# Patient Record
Sex: FEMALE | Race: BLACK OR AFRICAN AMERICAN | NOT HISPANIC OR LATINO
[De-identification: names, ages, dates, MRNs, and addresses within clinical notes are randomized per-mention and may not be internally consistent; named-entity substitution may affect disease eponyms.]

---

## 2017-01-17 ENCOUNTER — RECORD ABSTRACTING (OUTPATIENT)
Age: 67
End: 2017-01-17

## 2017-01-17 ENCOUNTER — APPOINTMENT (OUTPATIENT)
Dept: CARDIOLOGY | Facility: CLINIC | Age: 67
End: 2017-01-17

## 2017-01-17 ENCOUNTER — NON-APPOINTMENT (OUTPATIENT)
Age: 67
End: 2017-01-17

## 2017-01-17 VITALS
HEART RATE: 77 BPM | WEIGHT: 148 LBS | DIASTOLIC BLOOD PRESSURE: 76 MMHG | RESPIRATION RATE: 16 BRPM | BODY MASS INDEX: 25.27 KG/M2 | SYSTOLIC BLOOD PRESSURE: 121 MMHG | HEIGHT: 64 IN | OXYGEN SATURATION: 98 %

## 2017-01-17 DIAGNOSIS — Z86.19 PERSONAL HISTORY OF OTHER INFECTIOUS AND PARASITIC DISEASES: ICD-10-CM

## 2017-01-17 DIAGNOSIS — I10 ESSENTIAL (PRIMARY) HYPERTENSION: ICD-10-CM

## 2017-01-17 DIAGNOSIS — E11.9 TYPE 2 DIABETES MELLITUS W/OUT COMPLICATIONS: ICD-10-CM

## 2017-01-17 DIAGNOSIS — R07.9 CHEST PAIN, UNSPECIFIED: ICD-10-CM

## 2017-01-17 DIAGNOSIS — Z78.9 OTHER SPECIFIED HEALTH STATUS: ICD-10-CM

## 2017-01-17 DIAGNOSIS — Z87.19 PERSONAL HISTORY OF OTHER DISEASES OF THE DIGESTIVE SYSTEM: ICD-10-CM

## 2017-01-17 DIAGNOSIS — Z86.69 PERSONAL HISTORY OF OTHER DISEASES OF THE NERVOUS SYSTEM AND SENSE ORGANS: ICD-10-CM

## 2017-01-17 DIAGNOSIS — E78.00 PURE HYPERCHOLESTEROLEMIA, UNSPECIFIED: ICD-10-CM

## 2017-01-17 DIAGNOSIS — M15.9 POLYOSTEOARTHRITIS, UNSPECIFIED: ICD-10-CM

## 2017-01-17 DIAGNOSIS — L98.9 DISORDER OF THE SKIN AND SUBCUTANEOUS TISSUE, UNSPECIFIED: ICD-10-CM

## 2017-01-17 DIAGNOSIS — Z86.39 PERSONAL HISTORY OF OTHER ENDOCRINE, NUTRITIONAL AND METABOLIC DISEASE: ICD-10-CM

## 2017-01-17 DIAGNOSIS — Z86.59 PERSONAL HISTORY OF OTHER MENTAL AND BEHAVIORAL DISORDERS: ICD-10-CM

## 2017-01-17 DIAGNOSIS — F25.9 SCHIZOAFFECTIVE DISORDER, UNSPECIFIED: ICD-10-CM

## 2017-01-17 RX ORDER — LISINOPRIL 30 MG/1
TABLET ORAL
Refills: 0 | Status: ACTIVE | COMMUNITY

## 2017-01-17 RX ORDER — METFORMIN HYDROCHLORIDE 625 MG/1
TABLET ORAL
Refills: 0 | Status: ACTIVE | COMMUNITY

## 2017-01-17 RX ORDER — CLOZAPINE 200 MG/1
TABLET ORAL
Refills: 0 | Status: ACTIVE | COMMUNITY

## 2017-01-17 RX ORDER — AMLODIPINE BESYLATE 5 MG/1
TABLET ORAL
Refills: 0 | Status: ACTIVE | COMMUNITY

## 2017-01-18 ENCOUNTER — NON-APPOINTMENT (OUTPATIENT)
Age: 67
End: 2017-01-18

## 2017-01-18 PROBLEM — Z87.19 HISTORY OF ESOPHAGEAL REFLUX: Status: RESOLVED | Noted: 2017-01-18 | Resolved: 2017-01-18

## 2017-01-18 PROBLEM — M15.9 GENERALIZED OSTEOARTHRITIS: Status: RESOLVED | Noted: 2017-01-18 | Resolved: 2017-01-18

## 2017-01-18 PROBLEM — Z86.59 HISTORY OF DEPRESSION: Status: RESOLVED | Noted: 2017-01-18 | Resolved: 2017-01-18

## 2017-01-18 PROBLEM — Z86.69 HISTORY OF PARKINSON'S DISEASE: Status: RESOLVED | Noted: 2017-01-18 | Resolved: 2017-01-18

## 2017-01-18 PROBLEM — Z86.39 HISTORY OF HYPOTHYROIDISM: Status: RESOLVED | Noted: 2017-01-18 | Resolved: 2017-01-18

## 2017-01-26 ENCOUNTER — APPOINTMENT (OUTPATIENT)
Dept: CT IMAGING | Facility: CLINIC | Age: 67
End: 2017-01-26

## 2017-01-30 ENCOUNTER — APPOINTMENT (OUTPATIENT)
Dept: CV DIAGNOSITCS | Facility: HOSPITAL | Age: 67
End: 2017-01-30

## 2017-01-30 ENCOUNTER — OUTPATIENT (OUTPATIENT)
Dept: OUTPATIENT SERVICES | Facility: HOSPITAL | Age: 67
LOS: 1 days | End: 2017-01-30
Payer: MEDICAID

## 2017-01-30 DIAGNOSIS — R07.9 CHEST PAIN, UNSPECIFIED: ICD-10-CM

## 2017-01-30 PROCEDURE — 93306 TTE W/DOPPLER COMPLETE: CPT | Mod: 26

## 2017-02-14 ENCOUNTER — APPOINTMENT (OUTPATIENT)
Dept: CARDIOLOGY | Facility: CLINIC | Age: 67
End: 2017-02-14

## 2017-02-14 ENCOUNTER — APPOINTMENT (OUTPATIENT)
Dept: CV DIAGNOSITCS | Facility: HOSPITAL | Age: 67
End: 2017-02-14

## 2017-09-11 ENCOUNTER — APPOINTMENT (OUTPATIENT)
Dept: MAMMOGRAPHY | Facility: CLINIC | Age: 67
End: 2017-09-11
Payer: MEDICAID

## 2017-09-11 ENCOUNTER — OUTPATIENT (OUTPATIENT)
Dept: OUTPATIENT SERVICES | Facility: HOSPITAL | Age: 67
LOS: 1 days | End: 2017-09-11
Payer: MEDICARE

## 2017-09-11 ENCOUNTER — APPOINTMENT (OUTPATIENT)
Dept: ULTRASOUND IMAGING | Facility: CLINIC | Age: 67
End: 2017-09-11
Payer: MEDICAID

## 2017-09-11 DIAGNOSIS — Z00.00 ENCOUNTER FOR GENERAL ADULT MEDICAL EXAMINATION WITHOUT ABNORMAL FINDINGS: ICD-10-CM

## 2017-09-11 PROCEDURE — 76642 ULTRASOUND BREAST LIMITED: CPT

## 2017-09-11 PROCEDURE — 76642 ULTRASOUND BREAST LIMITED: CPT | Mod: 26,RT

## 2017-09-11 PROCEDURE — G0279: CPT

## 2017-09-11 PROCEDURE — G0279: CPT | Mod: 26

## 2017-09-11 PROCEDURE — G0204: CPT | Mod: 26

## 2017-09-11 PROCEDURE — 77066 DX MAMMO INCL CAD BI: CPT

## 2017-10-28 ENCOUNTER — EMERGENCY (EMERGENCY)
Facility: HOSPITAL | Age: 67
LOS: 1 days | Discharge: NURSING, REHAB OR LONG-TERM CARE | End: 2017-10-28
Attending: EMERGENCY MEDICINE | Admitting: EMERGENCY MEDICINE
Payer: MEDICAID

## 2017-10-28 VITALS
HEART RATE: 81 BPM | SYSTOLIC BLOOD PRESSURE: 149 MMHG | OXYGEN SATURATION: 100 % | TEMPERATURE: 98 F | DIASTOLIC BLOOD PRESSURE: 83 MMHG | RESPIRATION RATE: 18 BRPM

## 2017-10-28 VITALS
DIASTOLIC BLOOD PRESSURE: 82 MMHG | SYSTOLIC BLOOD PRESSURE: 129 MMHG | TEMPERATURE: 98 F | RESPIRATION RATE: 16 BRPM | HEART RATE: 95 BPM | OXYGEN SATURATION: 100 %

## 2017-10-28 LAB
ALBUMIN SERPL ELPH-MCNC: 3.6 G/DL — SIGNIFICANT CHANGE UP (ref 3.3–5)
ALP SERPL-CCNC: 129 U/L — HIGH (ref 40–120)
ALT FLD-CCNC: 18 U/L — SIGNIFICANT CHANGE UP (ref 4–33)
AST SERPL-CCNC: 22 U/L — SIGNIFICANT CHANGE UP (ref 4–32)
BASOPHILS # BLD AUTO: 0.04 K/UL — SIGNIFICANT CHANGE UP (ref 0–0.2)
BASOPHILS NFR BLD AUTO: 0.7 % — SIGNIFICANT CHANGE UP (ref 0–2)
BILIRUB SERPL-MCNC: 0.2 MG/DL — SIGNIFICANT CHANGE UP (ref 0.2–1.2)
BUN SERPL-MCNC: 18 MG/DL — SIGNIFICANT CHANGE UP (ref 7–23)
CALCIUM SERPL-MCNC: 9.4 MG/DL — SIGNIFICANT CHANGE UP (ref 8.4–10.5)
CHLORIDE SERPL-SCNC: 103 MMOL/L — SIGNIFICANT CHANGE UP (ref 98–107)
CO2 SERPL-SCNC: 25 MMOL/L — SIGNIFICANT CHANGE UP (ref 22–31)
CREAT SERPL-MCNC: 0.89 MG/DL — SIGNIFICANT CHANGE UP (ref 0.5–1.3)
EOSINOPHIL # BLD AUTO: 0.19 K/UL — SIGNIFICANT CHANGE UP (ref 0–0.5)
EOSINOPHIL NFR BLD AUTO: 3.3 % — SIGNIFICANT CHANGE UP (ref 0–6)
GLUCOSE SERPL-MCNC: 92 MG/DL — SIGNIFICANT CHANGE UP (ref 70–99)
HCT VFR BLD CALC: 33.5 % — LOW (ref 34.5–45)
HGB BLD-MCNC: 10.7 G/DL — LOW (ref 11.5–15.5)
IMM GRANULOCYTES # BLD AUTO: 0.02 # — SIGNIFICANT CHANGE UP
IMM GRANULOCYTES NFR BLD AUTO: 0.3 % — SIGNIFICANT CHANGE UP (ref 0–1.5)
LYMPHOCYTES # BLD AUTO: 1.66 K/UL — SIGNIFICANT CHANGE UP (ref 1–3.3)
LYMPHOCYTES # BLD AUTO: 29 % — SIGNIFICANT CHANGE UP (ref 13–44)
MAGNESIUM SERPL-MCNC: 1.9 MG/DL — SIGNIFICANT CHANGE UP (ref 1.6–2.6)
MCHC RBC-ENTMCNC: 27.2 PG — SIGNIFICANT CHANGE UP (ref 27–34)
MCHC RBC-ENTMCNC: 31.9 % — LOW (ref 32–36)
MCV RBC AUTO: 85.2 FL — SIGNIFICANT CHANGE UP (ref 80–100)
MONOCYTES # BLD AUTO: 0.58 K/UL — SIGNIFICANT CHANGE UP (ref 0–0.9)
MONOCYTES NFR BLD AUTO: 10.1 % — SIGNIFICANT CHANGE UP (ref 2–14)
NEUTROPHILS # BLD AUTO: 3.23 K/UL — SIGNIFICANT CHANGE UP (ref 1.8–7.4)
NEUTROPHILS NFR BLD AUTO: 56.6 % — SIGNIFICANT CHANGE UP (ref 43–77)
NRBC # FLD: 0 — SIGNIFICANT CHANGE UP
PHOSPHATE SERPL-MCNC: 4 MG/DL — SIGNIFICANT CHANGE UP (ref 2.5–4.5)
PLATELET # BLD AUTO: 219 K/UL — SIGNIFICANT CHANGE UP (ref 150–400)
PMV BLD: 11.3 FL — SIGNIFICANT CHANGE UP (ref 7–13)
POTASSIUM SERPL-MCNC: 3.8 MMOL/L — SIGNIFICANT CHANGE UP (ref 3.5–5.3)
POTASSIUM SERPL-SCNC: 3.8 MMOL/L — SIGNIFICANT CHANGE UP (ref 3.5–5.3)
PROT SERPL-MCNC: 7.3 G/DL — SIGNIFICANT CHANGE UP (ref 6–8.3)
RBC # BLD: 3.93 M/UL — SIGNIFICANT CHANGE UP (ref 3.8–5.2)
RBC # FLD: 13.8 % — SIGNIFICANT CHANGE UP (ref 10.3–14.5)
SODIUM SERPL-SCNC: 140 MMOL/L — SIGNIFICANT CHANGE UP (ref 135–145)
WBC # BLD: 5.72 K/UL — SIGNIFICANT CHANGE UP (ref 3.8–10.5)
WBC # FLD AUTO: 5.72 K/UL — SIGNIFICANT CHANGE UP (ref 3.8–10.5)

## 2017-10-28 PROCEDURE — 74020: CPT | Mod: 26

## 2017-10-28 PROCEDURE — 99284 EMERGENCY DEPT VISIT MOD MDM: CPT | Mod: 25

## 2017-10-28 NOTE — ED ADULT TRIAGE NOTE - CHIEF COMPLAINT QUOTE
BIBEMS from [facility] c/o difficulty sleeping, HAs and Diarrhea. Also reports "I want to know why my hair and skin is changing color".  Pt VSS, currently calm/cooperative, aaox3.  PMHx Parkinsons, Hypothyroid, DM2, HLD, GERD, Schizophrenia, Depression, HTN.

## 2017-10-28 NOTE — ED PROVIDER NOTE - PMH
Hemorrhoids    Hyperlipidemia    Hypothyroidism    Parkinson disease    Schizophrenia    Seizure disorder    Type II diabetes mellitus

## 2017-10-28 NOTE — ED PROVIDER NOTE - OBJECTIVE STATEMENT
66 y.o F with parkinsons dz,seizure d/o,hypothyrodism,DMII,HLD,GERD,schizophrenia,HTN,Asthma, hemmorhoids and major depression presenting with 3 day history of diarrhea. t reports that three days ago she had some chinese food and then afterwards she began having multiple episodes of diarrhea. She reports that associated with the diarrhea she has noticed some bloody on the toilet paper. She also reports darkening of the skin in the legs which she associates with a lack of sleep. Pt reports that she has continued to use stool softeners these past three days despite having diarrhea.

## 2017-10-28 NOTE — ED ADULT NURSE NOTE - OBJECTIVE STATEMENT
Pt received rm 28 a&ox3 and ambulatory multiple c/o. Pt comes from [facility] c/o difficulty sleeping, HAs and Diarrhea. Also reports "I want to know why my hair and skin is changing color".  Pt VSS, currently calm/cooperative PMHx Parkinsons, Hypothyroid, DM2, HLD, GERD, Schizophrenia, Depression, HTN. Awaiting further eval, will continue to monitor.

## 2017-10-28 NOTE — ED ADULT NURSE NOTE - CHIEF COMPLAINT QUOTE
BIBEMS from [facility] c/o difficulty sleeping, HAs and Diarrhea. Also reports "I want to know why my hair and skin is changing color".  Pt VSS, currently calm/cooperative, aaox3.  PMHx Parkinsons, Hypothyroid, DM2, HLD, GERD, Schizophrenia, Depression, HTN.

## 2017-10-28 NOTE — ED PROVIDER NOTE - CARE PLAN
Principal Discharge DX:	Diarrhea  Secondary Diagnosis:	Insomnia Principal Discharge DX:	Constipation  Secondary Diagnosis:	Insomnia

## 2017-10-28 NOTE — ED PROVIDER NOTE - PROGRESS NOTE DETAILS
No BM in ED. Tolerated PO. Pt concerned about chronic insomnia and dry skin. Will have pt [facility].

## 2017-10-28 NOTE — ED PROVIDER NOTE - ATTENDING CONTRIBUTION TO CARE
I was physically present for the E/M service provided. I agree with above history, physical, and plan which I have reviewed and edited where appropriate. I was physically present for the key portions of the service provided.    66F with Parkinsons dz, seizure d/o, Hypothyrodism, DMII, HLD, GERD, schizophrenia, HTN, Asthma, p/w 3 days on NB diarrhea. Started after eating Chinese food. No abx use. No recent travel. No abdominal pain. No N/V. Check electrolytes. Send stool studies. Tolerating PO. I was physically present for the E/M service provided. I agree with above history, physical, and plan which I have reviewed and edited where appropriate. I was physically present for the key portions of the service provided.    66F with Parkinsons dz, seizure d/o, Hypothyrodism, DMII, HLD, GERD, schizophrenia, HTN, Asthma, resident of [facility] p/w 3 days on NB diarrhea. Started after eating Chinese food. No abx use. No recent travel. No abdominal pain. No N/V. Check electrolytes. Send stool studies. Tolerating PO.

## 2018-04-12 NOTE — ED ADULT TRIAGE NOTE - NS ED NOTE AC HIGH RISK COUNTRIES
Prescription was called into [facility] 4/11.  Explained to patient that prescription should be ready for p/u for her to call her pharmacy to inquire.   No

## 2018-05-17 ENCOUNTER — INPATIENT (INPATIENT)
Facility: HOSPITAL | Age: 68
LOS: 28 days | Discharge: NURSING, REHAB OR LONG-TERM CARE | End: 2018-06-15
Attending: PSYCHIATRY & NEUROLOGY | Admitting: PSYCHIATRY & NEUROLOGY
Payer: MEDICARE

## 2018-05-17 VITALS
HEART RATE: 72 BPM | SYSTOLIC BLOOD PRESSURE: 113 MMHG | OXYGEN SATURATION: 100 % | TEMPERATURE: 98 F | DIASTOLIC BLOOD PRESSURE: 68 MMHG | RESPIRATION RATE: 16 BRPM

## 2018-05-17 DIAGNOSIS — F20.9 SCHIZOPHRENIA, UNSPECIFIED: ICD-10-CM

## 2018-05-17 LAB
ALBUMIN SERPL ELPH-MCNC: 3.8 G/DL — SIGNIFICANT CHANGE UP (ref 3.3–5)
ALP SERPL-CCNC: 141 U/L — HIGH (ref 40–120)
ALT FLD-CCNC: 11 U/L — SIGNIFICANT CHANGE UP (ref 4–33)
APAP SERPL-MCNC: < 15 UG/ML — LOW (ref 15–25)
AST SERPL-CCNC: 21 U/L — SIGNIFICANT CHANGE UP (ref 4–32)
BASOPHILS # BLD AUTO: 0.05 K/UL — SIGNIFICANT CHANGE UP (ref 0–0.2)
BASOPHILS NFR BLD AUTO: 0.7 % — SIGNIFICANT CHANGE UP (ref 0–2)
BILIRUB SERPL-MCNC: 0.2 MG/DL — SIGNIFICANT CHANGE UP (ref 0.2–1.2)
BUN SERPL-MCNC: 13 MG/DL — SIGNIFICANT CHANGE UP (ref 7–23)
CALCIUM SERPL-MCNC: 9.4 MG/DL — SIGNIFICANT CHANGE UP (ref 8.4–10.5)
CHLORIDE SERPL-SCNC: 102 MMOL/L — SIGNIFICANT CHANGE UP (ref 98–107)
CO2 SERPL-SCNC: 22 MMOL/L — SIGNIFICANT CHANGE UP (ref 22–31)
CREAT SERPL-MCNC: 0.81 MG/DL — SIGNIFICANT CHANGE UP (ref 0.5–1.3)
EOSINOPHIL # BLD AUTO: 0.32 K/UL — SIGNIFICANT CHANGE UP (ref 0–0.5)
EOSINOPHIL NFR BLD AUTO: 4.4 % — SIGNIFICANT CHANGE UP (ref 0–6)
ETHANOL BLD-MCNC: < 10 MG/DL — SIGNIFICANT CHANGE UP
GLUCOSE SERPL-MCNC: 104 MG/DL — HIGH (ref 70–99)
HCT VFR BLD CALC: 37.4 % — SIGNIFICANT CHANGE UP (ref 34.5–45)
HGB BLD-MCNC: 11.9 G/DL — SIGNIFICANT CHANGE UP (ref 11.5–15.5)
IMM GRANULOCYTES # BLD AUTO: 0.02 # — SIGNIFICANT CHANGE UP
IMM GRANULOCYTES NFR BLD AUTO: 0.3 % — SIGNIFICANT CHANGE UP (ref 0–1.5)
LYMPHOCYTES # BLD AUTO: 1.97 K/UL — SIGNIFICANT CHANGE UP (ref 1–3.3)
LYMPHOCYTES # BLD AUTO: 27 % — SIGNIFICANT CHANGE UP (ref 13–44)
MCHC RBC-ENTMCNC: 26.7 PG — LOW (ref 27–34)
MCHC RBC-ENTMCNC: 31.8 % — LOW (ref 32–36)
MCV RBC AUTO: 83.9 FL — SIGNIFICANT CHANGE UP (ref 80–100)
MONOCYTES # BLD AUTO: 0.69 K/UL — SIGNIFICANT CHANGE UP (ref 0–0.9)
MONOCYTES NFR BLD AUTO: 9.5 % — SIGNIFICANT CHANGE UP (ref 2–14)
NEUTROPHILS # BLD AUTO: 4.25 K/UL — SIGNIFICANT CHANGE UP (ref 1.8–7.4)
NEUTROPHILS NFR BLD AUTO: 58.1 % — SIGNIFICANT CHANGE UP (ref 43–77)
NRBC # FLD: 0 — SIGNIFICANT CHANGE UP
PLATELET # BLD AUTO: 220 K/UL — SIGNIFICANT CHANGE UP (ref 150–400)
PMV BLD: 11.9 FL — SIGNIFICANT CHANGE UP (ref 7–13)
POTASSIUM SERPL-MCNC: 4.2 MMOL/L — SIGNIFICANT CHANGE UP (ref 3.5–5.3)
POTASSIUM SERPL-SCNC: 4.2 MMOL/L — SIGNIFICANT CHANGE UP (ref 3.5–5.3)
PROT SERPL-MCNC: 7.7 G/DL — SIGNIFICANT CHANGE UP (ref 6–8.3)
RBC # BLD: 4.46 M/UL — SIGNIFICANT CHANGE UP (ref 3.8–5.2)
RBC # FLD: 14.6 % — HIGH (ref 10.3–14.5)
SALICYLATES SERPL-MCNC: < 5 MG/DL — LOW (ref 15–30)
SODIUM SERPL-SCNC: 138 MMOL/L — SIGNIFICANT CHANGE UP (ref 135–145)
TSH SERPL-MCNC: 1.97 UIU/ML — SIGNIFICANT CHANGE UP (ref 0.27–4.2)
WBC # BLD: 7.3 K/UL — SIGNIFICANT CHANGE UP (ref 3.8–10.5)
WBC # FLD AUTO: 7.3 K/UL — SIGNIFICANT CHANGE UP (ref 3.8–10.5)

## 2018-05-17 PROCEDURE — 99285 EMERGENCY DEPT VISIT HI MDM: CPT

## 2018-05-17 RX ORDER — ARIPIPRAZOLE 15 MG/1
15 TABLET ORAL DAILY
Qty: 0 | Refills: 0 | Status: DISCONTINUED | OUTPATIENT
Start: 2018-05-17 | End: 2018-05-19

## 2018-05-17 RX ORDER — METFORMIN HYDROCHLORIDE 850 MG/1
500 TABLET ORAL
Qty: 0 | Refills: 0 | Status: DISCONTINUED | OUTPATIENT
Start: 2018-05-17 | End: 2018-05-20

## 2018-05-17 RX ORDER — LEVOTHYROXINE SODIUM 125 MCG
75 TABLET ORAL DAILY
Qty: 0 | Refills: 0 | Status: DISCONTINUED | OUTPATIENT
Start: 2018-05-17 | End: 2018-06-15

## 2018-05-17 RX ORDER — OLANZAPINE 15 MG/1
15 TABLET, FILM COATED ORAL EVERY 12 HOURS
Qty: 0 | Refills: 0 | Status: DISCONTINUED | OUTPATIENT
Start: 2018-05-17 | End: 2018-05-20

## 2018-05-17 RX ORDER — HALOPERIDOL DECANOATE 100 MG/ML
2.5 INJECTION INTRAMUSCULAR
Qty: 0 | Refills: 0 | Status: DISCONTINUED | OUTPATIENT
Start: 2018-05-17 | End: 2018-05-19

## 2018-05-17 RX ORDER — LEVOCARNITINE 330 MG/1
660 TABLET ORAL
Qty: 0 | Refills: 0 | Status: DISCONTINUED | OUTPATIENT
Start: 2018-05-17 | End: 2018-06-15

## 2018-05-17 RX ORDER — VALPROIC ACID (AS SODIUM SALT) 250 MG/5ML
750 SOLUTION, ORAL ORAL AT BEDTIME
Qty: 0 | Refills: 0 | Status: DISCONTINUED | OUTPATIENT
Start: 2018-05-17 | End: 2018-05-21

## 2018-05-17 RX ORDER — DIPHENHYDRAMINE HCL 50 MG
50 CAPSULE ORAL
Qty: 0 | Refills: 0 | Status: DISCONTINUED | OUTPATIENT
Start: 2018-05-17 | End: 2018-05-18

## 2018-05-17 RX ORDER — SENNA PLUS 8.6 MG/1
1 TABLET ORAL
Qty: 0 | Refills: 0 | Status: DISCONTINUED | OUTPATIENT
Start: 2018-05-17 | End: 2018-06-15

## 2018-05-17 RX ORDER — MONTELUKAST 4 MG/1
10 TABLET, CHEWABLE ORAL ONCE
Qty: 0 | Refills: 0 | Status: COMPLETED | OUTPATIENT
Start: 2018-05-17 | End: 2018-05-17

## 2018-05-17 RX ORDER — LISINOPRIL 2.5 MG/1
10 TABLET ORAL DAILY
Qty: 0 | Refills: 0 | Status: DISCONTINUED | OUTPATIENT
Start: 2018-05-17 | End: 2018-06-15

## 2018-05-17 RX ORDER — VALPROIC ACID (AS SODIUM SALT) 250 MG/5ML
250 SOLUTION, ORAL ORAL DAILY
Qty: 0 | Refills: 0 | Status: DISCONTINUED | OUTPATIENT
Start: 2018-05-17 | End: 2018-05-25

## 2018-05-17 RX ORDER — ASPIRIN/CALCIUM CARB/MAGNESIUM 324 MG
81 TABLET ORAL DAILY
Qty: 0 | Refills: 0 | Status: DISCONTINUED | OUTPATIENT
Start: 2018-05-17 | End: 2018-06-15

## 2018-05-17 RX ORDER — METOPROLOL TARTRATE 50 MG
25 TABLET ORAL
Qty: 0 | Refills: 0 | Status: DISCONTINUED | OUTPATIENT
Start: 2018-05-17 | End: 2018-06-15

## 2018-05-17 RX ADMIN — SENNA PLUS 1 TABLET(S): 8.6 TABLET ORAL at 21:11

## 2018-05-17 RX ADMIN — METFORMIN HYDROCHLORIDE 500 MILLIGRAM(S): 850 TABLET ORAL at 21:11

## 2018-05-17 RX ADMIN — Medication 750 MILLIGRAM(S): at 21:11

## 2018-05-17 RX ADMIN — OLANZAPINE 15 MILLIGRAM(S): 15 TABLET, FILM COATED ORAL at 21:11

## 2018-05-17 NOTE — ED BEHAVIORAL HEALTH ASSESSMENT NOTE - PSYCHIATRIC ISSUES AND PLAN (INCLUDE STANDING AND PRN MEDICATION)
Abilify 15 daily, Zyprexa 15 mg BID, Depakene 250 mg daily + 750 mg qhs. Depakote level ordered for tomorrow AM Abilify 15 daily, Zyprexa 15 mg BID, Depakene 250 mg daily + 750 mg qhs. Depakote level ordered for tomorrow AM, Agitation - Haldol 2.5 IM, Ativan 1 IM, Benadryl 50 IM
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
## 2018-05-17 NOTE — ED BEHAVIORAL HEALTH ASSESSMENT NOTE - RISK ASSESSMENT
High - non compliant with meds, more aggressive at her facility, recent admission with no improvement of behavior, possibly more paranoid compared to baseline

## 2018-05-17 NOTE — ED ADULT NURSE NOTE - OBJECTIVE STATEMENT
Pt received to  area from [facility] for agitation and aggression. Pt calm and cooperative at this time. Pts belongings secured by PES. Pts labs drawn and sent. Pt waiting to be seen by Psych MD,

## 2018-05-17 NOTE — ED BEHAVIORAL HEALTH ASSESSMENT NOTE - DETAILS
Pt was admitted to [facility] about 2 weeks ago for similar behavior - aggression towards staff and non compliance with meds Could not be reached Pt was admitted to [facility] about 2 weeks ago for similar behavior - aggression towards staff and non compliance with meds As per HPI, pt has been more aggressive to staff and peers at her residence [facility] [place] contacted

## 2018-05-17 NOTE — ED BEHAVIORAL HEALTH ASSESSMENT NOTE - MEDICAL ISSUES AND PLAN (INCLUDE STANDING AND PRN MEDICATION)
HTN - Lisinopril, Metoprolol, DM2 - Metformin, Asa 81, Calcium 500, Carnitine 660 TID, Senna, Lactulose, Singulair, Ipratroprium Bromide 1 puff TID

## 2018-05-17 NOTE — ED BEHAVIORAL HEALTH ASSESSMENT NOTE - OTHER PAST PSYCHIATRIC HISTORY (INCLUDE DETAILS REGARDING ONSET, COURSE OF ILLNESS, INPATIENT/OUTPATIENT TREATMENT)
Numerous previous admissions, hx of ACT team, 2 recent admission at [facility] within the last 2 months

## 2018-05-17 NOTE — ED ADULT NURSE NOTE - CHIEF COMPLAINT QUOTE
pt brought in by ambulance from [facility]. pt was recently admitted to [facility] for aggression and uncontrollable erratic behavior. pt sent to ED today for continuous erratic behavior and paranoid ideations.   hx-schizophrenia, depressive d/o
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
## 2018-05-17 NOTE — ED BEHAVIORAL HEALTH ASSESSMENT NOTE - DESCRIPTION
mostly calm in the ED, no meds given DM2, HTN, Parkinson's, constipation lives at [facility] for the last 2-3 years, has not lived independent since the 1990s, has a sister who is local but does not see her often

## 2018-05-18 LAB
GLUCOSE BLDC GLUCOMTR-MCNC: 195 MG/DL — HIGH (ref 70–99)
VALPROATE SERPL-MCNC: 42.4 UG/ML — LOW (ref 50–100)

## 2018-05-18 PROCEDURE — 99222 1ST HOSP IP/OBS MODERATE 55: CPT

## 2018-05-18 RX ORDER — DEXTROSE 50 % IN WATER 50 %
25 SYRINGE (ML) INTRAVENOUS ONCE
Qty: 0 | Refills: 0 | Status: DISCONTINUED | OUTPATIENT
Start: 2018-05-18 | End: 2018-06-15

## 2018-05-18 RX ORDER — HALOPERIDOL DECANOATE 100 MG/ML
25 INJECTION INTRAMUSCULAR EVERY 6 HOURS
Qty: 0 | Refills: 0 | Status: DISCONTINUED | OUTPATIENT
Start: 2018-05-18 | End: 2018-05-18

## 2018-05-18 RX ORDER — SODIUM CHLORIDE 9 MG/ML
1000 INJECTION, SOLUTION INTRAVENOUS
Qty: 0 | Refills: 0 | Status: DISCONTINUED | OUTPATIENT
Start: 2018-05-18 | End: 2018-06-15

## 2018-05-18 RX ORDER — GLUCAGON INJECTION, SOLUTION 0.5 MG/.1ML
1 INJECTION, SOLUTION SUBCUTANEOUS ONCE
Qty: 0 | Refills: 0 | Status: DISCONTINUED | OUTPATIENT
Start: 2018-05-18 | End: 2018-06-15

## 2018-05-18 RX ORDER — HALOPERIDOL DECANOATE 100 MG/ML
2.5 INJECTION INTRAMUSCULAR EVERY 6 HOURS
Qty: 0 | Refills: 0 | Status: DISCONTINUED | OUTPATIENT
Start: 2018-05-18 | End: 2018-05-19

## 2018-05-18 RX ORDER — INSULIN LISPRO 100/ML
VIAL (ML) SUBCUTANEOUS
Qty: 0 | Refills: 0 | Status: DISCONTINUED | OUTPATIENT
Start: 2018-05-18 | End: 2018-06-15

## 2018-05-18 RX ORDER — DEXTROSE 50 % IN WATER 50 %
15 SYRINGE (ML) INTRAVENOUS ONCE
Qty: 0 | Refills: 0 | Status: DISCONTINUED | OUTPATIENT
Start: 2018-05-18 | End: 2018-06-15

## 2018-05-18 RX ORDER — DEXTROSE 50 % IN WATER 50 %
12.5 SYRINGE (ML) INTRAVENOUS ONCE
Qty: 0 | Refills: 0 | Status: DISCONTINUED | OUTPATIENT
Start: 2018-05-18 | End: 2018-06-15

## 2018-05-18 RX ADMIN — LEVOCARNITINE 660 MILLIGRAM(S): 330 TABLET ORAL at 08:51

## 2018-05-18 RX ADMIN — OLANZAPINE 15 MILLIGRAM(S): 15 TABLET, FILM COATED ORAL at 21:38

## 2018-05-18 RX ADMIN — Medication 25 MILLIGRAM(S): at 08:53

## 2018-05-18 RX ADMIN — SENNA PLUS 1 TABLET(S): 8.6 TABLET ORAL at 21:38

## 2018-05-18 RX ADMIN — METFORMIN HYDROCHLORIDE 500 MILLIGRAM(S): 850 TABLET ORAL at 08:53

## 2018-05-18 RX ADMIN — METFORMIN HYDROCHLORIDE 500 MILLIGRAM(S): 850 TABLET ORAL at 21:38

## 2018-05-18 RX ADMIN — HALOPERIDOL DECANOATE 2.5 MILLIGRAM(S): 100 INJECTION INTRAMUSCULAR at 06:27

## 2018-05-18 RX ADMIN — ARIPIPRAZOLE 15 MILLIGRAM(S): 15 TABLET ORAL at 08:51

## 2018-05-18 RX ADMIN — SENNA PLUS 1 TABLET(S): 8.6 TABLET ORAL at 08:54

## 2018-05-18 RX ADMIN — Medication 81 MILLIGRAM(S): at 08:51

## 2018-05-18 RX ADMIN — OLANZAPINE 15 MILLIGRAM(S): 15 TABLET, FILM COATED ORAL at 08:53

## 2018-05-18 RX ADMIN — Medication 75 MICROGRAM(S): at 08:51

## 2018-05-18 RX ADMIN — HALOPERIDOL DECANOATE 2.5 MILLIGRAM(S): 100 INJECTION INTRAMUSCULAR at 23:13

## 2018-05-18 RX ADMIN — Medication 25 MILLIGRAM(S): at 21:38

## 2018-05-18 RX ADMIN — Medication 250 MILLIGRAM(S): at 08:54

## 2018-05-18 RX ADMIN — Medication 750 MILLIGRAM(S): at 21:38

## 2018-05-18 RX ADMIN — HALOPERIDOL DECANOATE 2.5 MILLIGRAM(S): 100 INJECTION INTRAMUSCULAR at 17:30

## 2018-05-18 RX ADMIN — LISINOPRIL 10 MILLIGRAM(S): 2.5 TABLET ORAL at 08:53

## 2018-05-18 RX ADMIN — LEVOCARNITINE 660 MILLIGRAM(S): 330 TABLET ORAL at 17:34

## 2018-05-18 RX ADMIN — LEVOCARNITINE 660 MILLIGRAM(S): 330 TABLET ORAL at 13:37

## 2018-05-19 LAB
CHOLEST SERPL-MCNC: 128 MG/DL — SIGNIFICANT CHANGE UP (ref 120–199)
GLUCOSE BLDC GLUCOMTR-MCNC: 124 MG/DL — HIGH (ref 70–99)
GLUCOSE BLDC GLUCOMTR-MCNC: 144 MG/DL — HIGH (ref 70–99)
GLUCOSE BLDC GLUCOMTR-MCNC: 164 MG/DL — HIGH (ref 70–99)
GLUCOSE BLDC GLUCOMTR-MCNC: 175 MG/DL — HIGH (ref 70–99)
HBA1C BLD-MCNC: 7.8 % — HIGH (ref 4–5.6)
HDLC SERPL-MCNC: 49 MG/DL — SIGNIFICANT CHANGE UP (ref 45–65)
LIPID PNL WITH DIRECT LDL SERPL: 63 MG/DL — SIGNIFICANT CHANGE UP
TRIGL SERPL-MCNC: 74 MG/DL — SIGNIFICANT CHANGE UP (ref 10–149)

## 2018-05-19 PROCEDURE — 99232 SBSQ HOSP IP/OBS MODERATE 35: CPT

## 2018-05-19 RX ORDER — HALOPERIDOL DECANOATE 100 MG/ML
3 INJECTION INTRAMUSCULAR EVERY 4 HOURS
Qty: 0 | Refills: 0 | Status: DISCONTINUED | OUTPATIENT
Start: 2018-05-19 | End: 2018-05-19

## 2018-05-19 RX ORDER — HALOPERIDOL DECANOATE 100 MG/ML
4 INJECTION INTRAMUSCULAR EVERY 6 HOURS
Qty: 0 | Refills: 0 | Status: DISCONTINUED | OUTPATIENT
Start: 2018-05-19 | End: 2018-05-19

## 2018-05-19 RX ORDER — LACTULOSE 10 G/15ML
10 SOLUTION ORAL ONCE
Qty: 0 | Refills: 0 | Status: COMPLETED | OUTPATIENT
Start: 2018-05-19 | End: 2018-05-19

## 2018-05-19 RX ORDER — HALOPERIDOL DECANOATE 100 MG/ML
3 INJECTION INTRAMUSCULAR EVERY 4 HOURS
Qty: 0 | Refills: 0 | Status: DISCONTINUED | OUTPATIENT
Start: 2018-05-19 | End: 2018-06-15

## 2018-05-19 RX ORDER — HALOPERIDOL DECANOATE 100 MG/ML
3 INJECTION INTRAMUSCULAR ONCE
Qty: 0 | Refills: 0 | Status: DISCONTINUED | OUTPATIENT
Start: 2018-05-19 | End: 2018-06-15

## 2018-05-19 RX ADMIN — HALOPERIDOL DECANOATE 3 MILLIGRAM(S): 100 INJECTION INTRAMUSCULAR at 15:03

## 2018-05-19 RX ADMIN — LEVOCARNITINE 660 MILLIGRAM(S): 330 TABLET ORAL at 17:19

## 2018-05-19 RX ADMIN — OLANZAPINE 15 MILLIGRAM(S): 15 TABLET, FILM COATED ORAL at 08:33

## 2018-05-19 RX ADMIN — HALOPERIDOL DECANOATE 2.5 MILLIGRAM(S): 100 INJECTION INTRAMUSCULAR at 10:56

## 2018-05-19 RX ADMIN — Medication 1 DROP(S): at 21:03

## 2018-05-19 RX ADMIN — Medication 25 MILLIGRAM(S): at 21:04

## 2018-05-19 RX ADMIN — SENNA PLUS 1 TABLET(S): 8.6 TABLET ORAL at 08:34

## 2018-05-19 RX ADMIN — LEVOCARNITINE 660 MILLIGRAM(S): 330 TABLET ORAL at 13:03

## 2018-05-19 RX ADMIN — Medication 75 MICROGRAM(S): at 08:33

## 2018-05-19 RX ADMIN — SENNA PLUS 1 TABLET(S): 8.6 TABLET ORAL at 21:03

## 2018-05-19 RX ADMIN — LEVOCARNITINE 660 MILLIGRAM(S): 330 TABLET ORAL at 08:33

## 2018-05-19 RX ADMIN — Medication 1: at 17:19

## 2018-05-19 RX ADMIN — ARIPIPRAZOLE 15 MILLIGRAM(S): 15 TABLET ORAL at 08:33

## 2018-05-19 RX ADMIN — METFORMIN HYDROCHLORIDE 500 MILLIGRAM(S): 850 TABLET ORAL at 08:33

## 2018-05-19 RX ADMIN — Medication 750 MILLIGRAM(S): at 21:03

## 2018-05-19 RX ADMIN — Medication 25 MILLIGRAM(S): at 08:33

## 2018-05-19 RX ADMIN — OLANZAPINE 15 MILLIGRAM(S): 15 TABLET, FILM COATED ORAL at 21:03

## 2018-05-19 RX ADMIN — METFORMIN HYDROCHLORIDE 500 MILLIGRAM(S): 850 TABLET ORAL at 17:19

## 2018-05-19 RX ADMIN — Medication 81 MILLIGRAM(S): at 08:33

## 2018-05-19 RX ADMIN — LISINOPRIL 10 MILLIGRAM(S): 2.5 TABLET ORAL at 08:33

## 2018-05-19 RX ADMIN — LACTULOSE 10 GRAM(S): 10 SOLUTION ORAL at 23:33

## 2018-05-19 RX ADMIN — Medication 250 MILLIGRAM(S): at 08:34

## 2018-05-20 LAB
GLUCOSE BLDC GLUCOMTR-MCNC: 137 MG/DL — HIGH (ref 70–99)
GLUCOSE BLDC GLUCOMTR-MCNC: 142 MG/DL — HIGH (ref 70–99)
GLUCOSE BLDC GLUCOMTR-MCNC: 191 MG/DL — HIGH (ref 70–99)
GLUCOSE BLDC GLUCOMTR-MCNC: 95 MG/DL — SIGNIFICANT CHANGE UP (ref 70–99)

## 2018-05-20 PROCEDURE — 99232 SBSQ HOSP IP/OBS MODERATE 35: CPT

## 2018-05-20 RX ORDER — HALOPERIDOL DECANOATE 100 MG/ML
3 INJECTION INTRAMUSCULAR THREE TIMES A DAY
Qty: 0 | Refills: 0 | Status: DISCONTINUED | OUTPATIENT
Start: 2018-05-20 | End: 2018-05-21

## 2018-05-20 RX ORDER — METFORMIN HYDROCHLORIDE 850 MG/1
500 TABLET ORAL
Qty: 0 | Refills: 0 | Status: DISCONTINUED | OUTPATIENT
Start: 2018-05-20 | End: 2018-06-15

## 2018-05-20 RX ORDER — OLANZAPINE 15 MG/1
5 TABLET, FILM COATED ORAL
Qty: 0 | Refills: 0 | Status: COMPLETED | OUTPATIENT
Start: 2018-05-20 | End: 2018-05-20

## 2018-05-20 RX ADMIN — LISINOPRIL 10 MILLIGRAM(S): 2.5 TABLET ORAL at 08:46

## 2018-05-20 RX ADMIN — Medication 1 DROP(S): at 08:46

## 2018-05-20 RX ADMIN — Medication 25 MILLIGRAM(S): at 08:46

## 2018-05-20 RX ADMIN — Medication 75 MICROGRAM(S): at 08:46

## 2018-05-20 RX ADMIN — HALOPERIDOL DECANOATE 3 MILLIGRAM(S): 100 INJECTION INTRAMUSCULAR at 20:39

## 2018-05-20 RX ADMIN — LEVOCARNITINE 660 MILLIGRAM(S): 330 TABLET ORAL at 12:42

## 2018-05-20 RX ADMIN — Medication 1 DROP(S): at 20:56

## 2018-05-20 RX ADMIN — OLANZAPINE 5 MILLIGRAM(S): 15 TABLET, FILM COATED ORAL at 08:46

## 2018-05-20 RX ADMIN — METFORMIN HYDROCHLORIDE 500 MILLIGRAM(S): 850 TABLET ORAL at 17:41

## 2018-05-20 RX ADMIN — OLANZAPINE 5 MILLIGRAM(S): 15 TABLET, FILM COATED ORAL at 20:40

## 2018-05-20 RX ADMIN — LEVOCARNITINE 660 MILLIGRAM(S): 330 TABLET ORAL at 08:46

## 2018-05-20 RX ADMIN — Medication 250 MILLIGRAM(S): at 08:46

## 2018-05-20 RX ADMIN — Medication 81 MILLIGRAM(S): at 08:46

## 2018-05-20 RX ADMIN — SENNA PLUS 1 TABLET(S): 8.6 TABLET ORAL at 20:40

## 2018-05-20 RX ADMIN — SENNA PLUS 1 TABLET(S): 8.6 TABLET ORAL at 08:46

## 2018-05-20 RX ADMIN — Medication 750 MILLIGRAM(S): at 20:40

## 2018-05-20 RX ADMIN — LEVOCARNITINE 660 MILLIGRAM(S): 330 TABLET ORAL at 17:41

## 2018-05-20 RX ADMIN — HALOPERIDOL DECANOATE 3 MILLIGRAM(S): 100 INJECTION INTRAMUSCULAR at 12:42

## 2018-05-20 RX ADMIN — Medication 25 MILLIGRAM(S): at 20:39

## 2018-05-20 RX ADMIN — HALOPERIDOL DECANOATE 3 MILLIGRAM(S): 100 INJECTION INTRAMUSCULAR at 08:46

## 2018-05-20 RX ADMIN — METFORMIN HYDROCHLORIDE 500 MILLIGRAM(S): 850 TABLET ORAL at 08:46

## 2018-05-21 LAB
GLUCOSE BLDC GLUCOMTR-MCNC: 114 MG/DL — HIGH (ref 70–99)
GLUCOSE BLDC GLUCOMTR-MCNC: 121 MG/DL — HIGH (ref 70–99)
GLUCOSE BLDC GLUCOMTR-MCNC: 148 MG/DL — HIGH (ref 70–99)
GLUCOSE BLDC GLUCOMTR-MCNC: 196 MG/DL — HIGH (ref 70–99)

## 2018-05-21 PROCEDURE — 99232 SBSQ HOSP IP/OBS MODERATE 35: CPT

## 2018-05-21 RX ORDER — DIVALPROEX SODIUM 500 MG/1
750 TABLET, DELAYED RELEASE ORAL AT BEDTIME
Qty: 0 | Refills: 0 | Status: DISCONTINUED | OUTPATIENT
Start: 2018-05-21 | End: 2018-06-15

## 2018-05-21 RX ORDER — HALOPERIDOL DECANOATE 100 MG/ML
3 INJECTION INTRAMUSCULAR
Qty: 0 | Refills: 0 | Status: DISCONTINUED | OUTPATIENT
Start: 2018-05-21 | End: 2018-05-23

## 2018-05-21 RX ADMIN — SENNA PLUS 1 TABLET(S): 8.6 TABLET ORAL at 09:12

## 2018-05-21 RX ADMIN — Medication 250 MILLIGRAM(S): at 09:17

## 2018-05-21 RX ADMIN — HALOPERIDOL DECANOATE 3 MILLIGRAM(S): 100 INJECTION INTRAMUSCULAR at 12:47

## 2018-05-21 RX ADMIN — HALOPERIDOL DECANOATE 3 MILLIGRAM(S): 100 INJECTION INTRAMUSCULAR at 21:52

## 2018-05-21 RX ADMIN — METFORMIN HYDROCHLORIDE 500 MILLIGRAM(S): 850 TABLET ORAL at 09:12

## 2018-05-21 RX ADMIN — LEVOCARNITINE 660 MILLIGRAM(S): 330 TABLET ORAL at 09:11

## 2018-05-21 RX ADMIN — HALOPERIDOL DECANOATE 3 MILLIGRAM(S): 100 INJECTION INTRAMUSCULAR at 21:09

## 2018-05-21 RX ADMIN — LEVOCARNITINE 660 MILLIGRAM(S): 330 TABLET ORAL at 12:48

## 2018-05-21 RX ADMIN — Medication 25 MILLIGRAM(S): at 09:12

## 2018-05-21 RX ADMIN — LISINOPRIL 10 MILLIGRAM(S): 2.5 TABLET ORAL at 09:12

## 2018-05-21 RX ADMIN — SENNA PLUS 1 TABLET(S): 8.6 TABLET ORAL at 21:09

## 2018-05-21 RX ADMIN — DIVALPROEX SODIUM 750 MILLIGRAM(S): 500 TABLET, DELAYED RELEASE ORAL at 21:09

## 2018-05-21 RX ADMIN — HALOPERIDOL DECANOATE 3 MILLIGRAM(S): 100 INJECTION INTRAMUSCULAR at 09:11

## 2018-05-21 RX ADMIN — Medication 25 MILLIGRAM(S): at 21:09

## 2018-05-21 RX ADMIN — LEVOCARNITINE 660 MILLIGRAM(S): 330 TABLET ORAL at 16:42

## 2018-05-21 RX ADMIN — Medication 1 DROP(S): at 09:11

## 2018-05-21 RX ADMIN — Medication 81 MILLIGRAM(S): at 09:11

## 2018-05-21 RX ADMIN — Medication 75 MICROGRAM(S): at 09:12

## 2018-05-21 RX ADMIN — METFORMIN HYDROCHLORIDE 500 MILLIGRAM(S): 850 TABLET ORAL at 16:42

## 2018-05-21 RX ADMIN — Medication 1 DROP(S): at 21:58

## 2018-05-22 LAB
GLUCOSE BLDC GLUCOMTR-MCNC: 124 MG/DL — HIGH (ref 70–99)
GLUCOSE BLDC GLUCOMTR-MCNC: 128 MG/DL — HIGH (ref 70–99)
GLUCOSE BLDC GLUCOMTR-MCNC: 154 MG/DL — HIGH (ref 70–99)
GLUCOSE BLDC GLUCOMTR-MCNC: 175 MG/DL — HIGH (ref 70–99)

## 2018-05-22 PROCEDURE — 99232 SBSQ HOSP IP/OBS MODERATE 35: CPT

## 2018-05-22 RX ORDER — LANOLIN ALCOHOL/MO/W.PET/CERES
3 CREAM (GRAM) TOPICAL ONCE
Qty: 0 | Refills: 0 | Status: COMPLETED | OUTPATIENT
Start: 2018-05-22 | End: 2018-05-22

## 2018-05-22 RX ADMIN — HALOPERIDOL DECANOATE 3 MILLIGRAM(S): 100 INJECTION INTRAMUSCULAR at 06:45

## 2018-05-22 RX ADMIN — Medication 1 DROP(S): at 20:48

## 2018-05-22 RX ADMIN — HALOPERIDOL DECANOATE 3 MILLIGRAM(S): 100 INJECTION INTRAMUSCULAR at 20:41

## 2018-05-22 RX ADMIN — LISINOPRIL 10 MILLIGRAM(S): 2.5 TABLET ORAL at 08:14

## 2018-05-22 RX ADMIN — METFORMIN HYDROCHLORIDE 500 MILLIGRAM(S): 850 TABLET ORAL at 08:14

## 2018-05-22 RX ADMIN — Medication 1 MILLIGRAM(S): at 17:21

## 2018-05-22 RX ADMIN — LEVOCARNITINE 660 MILLIGRAM(S): 330 TABLET ORAL at 11:24

## 2018-05-22 RX ADMIN — Medication 75 MICROGRAM(S): at 08:14

## 2018-05-22 RX ADMIN — LEVOCARNITINE 660 MILLIGRAM(S): 330 TABLET ORAL at 17:21

## 2018-05-22 RX ADMIN — Medication 3 MILLIGRAM(S): at 00:42

## 2018-05-22 RX ADMIN — Medication 250 MILLIGRAM(S): at 08:14

## 2018-05-22 RX ADMIN — LEVOCARNITINE 660 MILLIGRAM(S): 330 TABLET ORAL at 08:14

## 2018-05-22 RX ADMIN — METFORMIN HYDROCHLORIDE 500 MILLIGRAM(S): 850 TABLET ORAL at 17:21

## 2018-05-22 RX ADMIN — Medication 1 DROP(S): at 08:14

## 2018-05-22 RX ADMIN — Medication 81 MILLIGRAM(S): at 08:14

## 2018-05-22 RX ADMIN — SENNA PLUS 1 TABLET(S): 8.6 TABLET ORAL at 20:41

## 2018-05-22 RX ADMIN — DIVALPROEX SODIUM 750 MILLIGRAM(S): 500 TABLET, DELAYED RELEASE ORAL at 20:41

## 2018-05-22 RX ADMIN — HALOPERIDOL DECANOATE 3 MILLIGRAM(S): 100 INJECTION INTRAMUSCULAR at 11:24

## 2018-05-22 RX ADMIN — SENNA PLUS 1 TABLET(S): 8.6 TABLET ORAL at 08:14

## 2018-05-22 RX ADMIN — Medication 25 MILLIGRAM(S): at 08:14

## 2018-05-22 RX ADMIN — Medication 25 MILLIGRAM(S): at 20:41

## 2018-05-23 LAB
GLUCOSE BLDC GLUCOMTR-MCNC: 127 MG/DL — HIGH (ref 70–99)
GLUCOSE BLDC GLUCOMTR-MCNC: 155 MG/DL — HIGH (ref 70–99)
GLUCOSE BLDC GLUCOMTR-MCNC: 158 MG/DL — HIGH (ref 70–99)
GLUCOSE BLDC GLUCOMTR-MCNC: 81 MG/DL — SIGNIFICANT CHANGE UP (ref 70–99)

## 2018-05-23 PROCEDURE — 99232 SBSQ HOSP IP/OBS MODERATE 35: CPT

## 2018-05-23 RX ORDER — LACTULOSE 10 G/15ML
10 SOLUTION ORAL AT BEDTIME
Qty: 0 | Refills: 0 | Status: DISCONTINUED | OUTPATIENT
Start: 2018-05-23 | End: 2018-06-15

## 2018-05-23 RX ORDER — HALOPERIDOL DECANOATE 100 MG/ML
4 INJECTION INTRAMUSCULAR
Qty: 0 | Refills: 0 | Status: DISCONTINUED | OUTPATIENT
Start: 2018-05-23 | End: 2018-05-25

## 2018-05-23 RX ADMIN — LEVOCARNITINE 660 MILLIGRAM(S): 330 TABLET ORAL at 08:39

## 2018-05-23 RX ADMIN — Medication 1: at 17:44

## 2018-05-23 RX ADMIN — HALOPERIDOL DECANOATE 4 MILLIGRAM(S): 100 INJECTION INTRAMUSCULAR at 20:34

## 2018-05-23 RX ADMIN — LACTULOSE 10 GRAM(S): 10 SOLUTION ORAL at 20:34

## 2018-05-23 RX ADMIN — Medication 25 MILLIGRAM(S): at 20:42

## 2018-05-23 RX ADMIN — Medication 75 MICROGRAM(S): at 08:39

## 2018-05-23 RX ADMIN — SENNA PLUS 1 TABLET(S): 8.6 TABLET ORAL at 20:34

## 2018-05-23 RX ADMIN — Medication 1 DROP(S): at 08:39

## 2018-05-23 RX ADMIN — LEVOCARNITINE 660 MILLIGRAM(S): 330 TABLET ORAL at 17:44

## 2018-05-23 RX ADMIN — METFORMIN HYDROCHLORIDE 500 MILLIGRAM(S): 850 TABLET ORAL at 08:39

## 2018-05-23 RX ADMIN — METFORMIN HYDROCHLORIDE 500 MILLIGRAM(S): 850 TABLET ORAL at 17:44

## 2018-05-23 RX ADMIN — HALOPERIDOL DECANOATE 4 MILLIGRAM(S): 100 INJECTION INTRAMUSCULAR at 12:53

## 2018-05-23 RX ADMIN — SENNA PLUS 1 TABLET(S): 8.6 TABLET ORAL at 08:40

## 2018-05-23 RX ADMIN — Medication 250 MILLIGRAM(S): at 08:40

## 2018-05-23 RX ADMIN — Medication 1 DROP(S): at 20:34

## 2018-05-23 RX ADMIN — LEVOCARNITINE 660 MILLIGRAM(S): 330 TABLET ORAL at 12:53

## 2018-05-23 RX ADMIN — Medication 81 MILLIGRAM(S): at 08:39

## 2018-05-23 RX ADMIN — LISINOPRIL 10 MILLIGRAM(S): 2.5 TABLET ORAL at 08:39

## 2018-05-23 RX ADMIN — DIVALPROEX SODIUM 750 MILLIGRAM(S): 500 TABLET, DELAYED RELEASE ORAL at 20:34

## 2018-05-23 RX ADMIN — HALOPERIDOL DECANOATE 3 MILLIGRAM(S): 100 INJECTION INTRAMUSCULAR at 05:57

## 2018-05-23 RX ADMIN — Medication 25 MILLIGRAM(S): at 08:39

## 2018-05-24 LAB
GLUCOSE BLDC GLUCOMTR-MCNC: 112 MG/DL — HIGH (ref 70–99)
GLUCOSE BLDC GLUCOMTR-MCNC: 142 MG/DL — HIGH (ref 70–99)
GLUCOSE BLDC GLUCOMTR-MCNC: 189 MG/DL — HIGH (ref 70–99)
GLUCOSE BLDC GLUCOMTR-MCNC: 198 MG/DL — HIGH (ref 70–99)
VALPROATE SERPL-MCNC: 78.9 UG/ML — SIGNIFICANT CHANGE UP (ref 50–100)

## 2018-05-24 PROCEDURE — 99232 SBSQ HOSP IP/OBS MODERATE 35: CPT

## 2018-05-24 RX ORDER — SODIUM CHLORIDE 0.65 %
1 AEROSOL, SPRAY (ML) NASAL
Qty: 0 | Refills: 0 | Status: DISCONTINUED | OUTPATIENT
Start: 2018-05-24 | End: 2018-06-15

## 2018-05-24 RX ADMIN — Medication 1 SPRAY(S): at 21:44

## 2018-05-24 RX ADMIN — SENNA PLUS 1 TABLET(S): 8.6 TABLET ORAL at 21:44

## 2018-05-24 RX ADMIN — Medication 25 MILLIGRAM(S): at 21:44

## 2018-05-24 RX ADMIN — DIVALPROEX SODIUM 750 MILLIGRAM(S): 500 TABLET, DELAYED RELEASE ORAL at 21:44

## 2018-05-24 RX ADMIN — LISINOPRIL 10 MILLIGRAM(S): 2.5 TABLET ORAL at 08:41

## 2018-05-24 RX ADMIN — HALOPERIDOL DECANOATE 4 MILLIGRAM(S): 100 INJECTION INTRAMUSCULAR at 06:10

## 2018-05-24 RX ADMIN — HALOPERIDOL DECANOATE 4 MILLIGRAM(S): 100 INJECTION INTRAMUSCULAR at 13:29

## 2018-05-24 RX ADMIN — Medication 1: at 17:23

## 2018-05-24 RX ADMIN — LEVOCARNITINE 660 MILLIGRAM(S): 330 TABLET ORAL at 17:23

## 2018-05-24 RX ADMIN — Medication 1: at 08:41

## 2018-05-24 RX ADMIN — SENNA PLUS 1 TABLET(S): 8.6 TABLET ORAL at 08:41

## 2018-05-24 RX ADMIN — Medication 1 DROP(S): at 08:41

## 2018-05-24 RX ADMIN — METFORMIN HYDROCHLORIDE 500 MILLIGRAM(S): 850 TABLET ORAL at 17:23

## 2018-05-24 RX ADMIN — Medication 75 MICROGRAM(S): at 08:41

## 2018-05-24 RX ADMIN — Medication 250 MILLIGRAM(S): at 08:41

## 2018-05-24 RX ADMIN — METFORMIN HYDROCHLORIDE 500 MILLIGRAM(S): 850 TABLET ORAL at 08:41

## 2018-05-24 RX ADMIN — Medication 81 MILLIGRAM(S): at 08:41

## 2018-05-24 RX ADMIN — Medication 1 DROP(S): at 21:44

## 2018-05-24 RX ADMIN — HALOPERIDOL DECANOATE 4 MILLIGRAM(S): 100 INJECTION INTRAMUSCULAR at 21:44

## 2018-05-24 RX ADMIN — LEVOCARNITINE 660 MILLIGRAM(S): 330 TABLET ORAL at 08:41

## 2018-05-24 RX ADMIN — LACTULOSE 10 GRAM(S): 10 SOLUTION ORAL at 21:44

## 2018-05-24 RX ADMIN — LEVOCARNITINE 660 MILLIGRAM(S): 330 TABLET ORAL at 13:29

## 2018-05-24 RX ADMIN — Medication 25 MILLIGRAM(S): at 08:41

## 2018-05-25 LAB
GLUCOSE BLDC GLUCOMTR-MCNC: 124 MG/DL — HIGH (ref 70–99)
GLUCOSE BLDC GLUCOMTR-MCNC: 135 MG/DL — HIGH (ref 70–99)
GLUCOSE BLDC GLUCOMTR-MCNC: 144 MG/DL — HIGH (ref 70–99)
GLUCOSE BLDC GLUCOMTR-MCNC: 155 MG/DL — HIGH (ref 70–99)

## 2018-05-25 PROCEDURE — 99232 SBSQ HOSP IP/OBS MODERATE 35: CPT

## 2018-05-25 RX ORDER — HALOPERIDOL DECANOATE 100 MG/ML
5 INJECTION INTRAMUSCULAR
Qty: 0 | Refills: 0 | Status: DISCONTINUED | OUTPATIENT
Start: 2018-05-25 | End: 2018-05-29

## 2018-05-25 RX ORDER — BENZTROPINE MESYLATE 1 MG
0.5 TABLET ORAL
Qty: 0 | Refills: 0 | Status: DISCONTINUED | OUTPATIENT
Start: 2018-05-25 | End: 2018-06-15

## 2018-05-25 RX ADMIN — HALOPERIDOL DECANOATE 5 MILLIGRAM(S): 100 INJECTION INTRAMUSCULAR at 20:53

## 2018-05-25 RX ADMIN — Medication 0.5 MILLIGRAM(S): at 20:52

## 2018-05-25 RX ADMIN — METFORMIN HYDROCHLORIDE 500 MILLIGRAM(S): 850 TABLET ORAL at 09:44

## 2018-05-25 RX ADMIN — Medication 1 DROP(S): at 20:47

## 2018-05-25 RX ADMIN — LEVOCARNITINE 660 MILLIGRAM(S): 330 TABLET ORAL at 13:16

## 2018-05-25 RX ADMIN — Medication 81 MILLIGRAM(S): at 09:44

## 2018-05-25 RX ADMIN — Medication 1 SPRAY(S): at 09:44

## 2018-05-25 RX ADMIN — Medication 0.5 MILLIGRAM(S): at 09:44

## 2018-05-25 RX ADMIN — HALOPERIDOL DECANOATE 4 MILLIGRAM(S): 100 INJECTION INTRAMUSCULAR at 06:42

## 2018-05-25 RX ADMIN — LEVOCARNITINE 660 MILLIGRAM(S): 330 TABLET ORAL at 17:44

## 2018-05-25 RX ADMIN — Medication 75 MICROGRAM(S): at 09:44

## 2018-05-25 RX ADMIN — Medication 25 MILLIGRAM(S): at 09:44

## 2018-05-25 RX ADMIN — DIVALPROEX SODIUM 750 MILLIGRAM(S): 500 TABLET, DELAYED RELEASE ORAL at 20:52

## 2018-05-25 RX ADMIN — SENNA PLUS 1 TABLET(S): 8.6 TABLET ORAL at 20:52

## 2018-05-25 RX ADMIN — SENNA PLUS 1 TABLET(S): 8.6 TABLET ORAL at 09:44

## 2018-05-25 RX ADMIN — LEVOCARNITINE 660 MILLIGRAM(S): 330 TABLET ORAL at 09:44

## 2018-05-25 RX ADMIN — Medication 1: at 17:44

## 2018-05-25 RX ADMIN — Medication 250 MILLIGRAM(S): at 09:44

## 2018-05-25 RX ADMIN — Medication 25 MILLIGRAM(S): at 20:52

## 2018-05-25 RX ADMIN — HALOPERIDOL DECANOATE 5 MILLIGRAM(S): 100 INJECTION INTRAMUSCULAR at 13:16

## 2018-05-25 RX ADMIN — Medication 1 DROP(S): at 09:44

## 2018-05-25 RX ADMIN — LISINOPRIL 10 MILLIGRAM(S): 2.5 TABLET ORAL at 09:44

## 2018-05-25 RX ADMIN — METFORMIN HYDROCHLORIDE 500 MILLIGRAM(S): 850 TABLET ORAL at 17:44

## 2018-05-26 LAB
GLUCOSE BLDC GLUCOMTR-MCNC: 106 MG/DL — HIGH (ref 70–99)
GLUCOSE BLDC GLUCOMTR-MCNC: 115 MG/DL — HIGH (ref 70–99)
GLUCOSE BLDC GLUCOMTR-MCNC: 133 MG/DL — HIGH (ref 70–99)
GLUCOSE BLDC GLUCOMTR-MCNC: 213 MG/DL — HIGH (ref 70–99)

## 2018-05-26 PROCEDURE — 99232 SBSQ HOSP IP/OBS MODERATE 35: CPT

## 2018-05-26 RX ORDER — LANOLIN ALCOHOL/MO/W.PET/CERES
3 CREAM (GRAM) TOPICAL ONCE
Qty: 0 | Refills: 0 | Status: COMPLETED | OUTPATIENT
Start: 2018-05-26 | End: 2018-05-26

## 2018-05-26 RX ADMIN — LEVOCARNITINE 660 MILLIGRAM(S): 330 TABLET ORAL at 08:55

## 2018-05-26 RX ADMIN — Medication 81 MILLIGRAM(S): at 08:55

## 2018-05-26 RX ADMIN — LISINOPRIL 10 MILLIGRAM(S): 2.5 TABLET ORAL at 08:55

## 2018-05-26 RX ADMIN — LEVOCARNITINE 660 MILLIGRAM(S): 330 TABLET ORAL at 13:22

## 2018-05-26 RX ADMIN — Medication 25 MILLIGRAM(S): at 20:27

## 2018-05-26 RX ADMIN — Medication 1 DROP(S): at 10:09

## 2018-05-26 RX ADMIN — METFORMIN HYDROCHLORIDE 500 MILLIGRAM(S): 850 TABLET ORAL at 16:42

## 2018-05-26 RX ADMIN — Medication 1 SPRAY(S): at 10:09

## 2018-05-26 RX ADMIN — Medication 0.5 MILLIGRAM(S): at 08:55

## 2018-05-26 RX ADMIN — LEVOCARNITINE 660 MILLIGRAM(S): 330 TABLET ORAL at 16:42

## 2018-05-26 RX ADMIN — LACTULOSE 10 GRAM(S): 10 SOLUTION ORAL at 20:28

## 2018-05-26 RX ADMIN — Medication 0.5 MILLIGRAM(S): at 20:27

## 2018-05-26 RX ADMIN — SENNA PLUS 1 TABLET(S): 8.6 TABLET ORAL at 20:27

## 2018-05-26 RX ADMIN — Medication 1 DROP(S): at 20:24

## 2018-05-26 RX ADMIN — Medication 3 MILLIGRAM(S): at 23:31

## 2018-05-26 RX ADMIN — SENNA PLUS 1 TABLET(S): 8.6 TABLET ORAL at 10:09

## 2018-05-26 RX ADMIN — DIVALPROEX SODIUM 750 MILLIGRAM(S): 500 TABLET, DELAYED RELEASE ORAL at 20:27

## 2018-05-26 RX ADMIN — HALOPERIDOL DECANOATE 5 MILLIGRAM(S): 100 INJECTION INTRAMUSCULAR at 20:27

## 2018-05-26 RX ADMIN — Medication 25 MILLIGRAM(S): at 08:55

## 2018-05-26 RX ADMIN — METFORMIN HYDROCHLORIDE 500 MILLIGRAM(S): 850 TABLET ORAL at 08:55

## 2018-05-26 RX ADMIN — HALOPERIDOL DECANOATE 5 MILLIGRAM(S): 100 INJECTION INTRAMUSCULAR at 05:56

## 2018-05-26 RX ADMIN — HALOPERIDOL DECANOATE 5 MILLIGRAM(S): 100 INJECTION INTRAMUSCULAR at 13:21

## 2018-05-26 RX ADMIN — Medication 2: at 17:05

## 2018-05-26 RX ADMIN — Medication 75 MICROGRAM(S): at 08:55

## 2018-05-27 LAB
GLUCOSE BLDC GLUCOMTR-MCNC: 102 MG/DL — HIGH (ref 70–99)
GLUCOSE BLDC GLUCOMTR-MCNC: 139 MG/DL — HIGH (ref 70–99)
GLUCOSE BLDC GLUCOMTR-MCNC: 148 MG/DL — HIGH (ref 70–99)
GLUCOSE BLDC GLUCOMTR-MCNC: 192 MG/DL — HIGH (ref 70–99)

## 2018-05-27 PROCEDURE — 99232 SBSQ HOSP IP/OBS MODERATE 35: CPT

## 2018-05-27 RX ADMIN — LISINOPRIL 10 MILLIGRAM(S): 2.5 TABLET ORAL at 09:07

## 2018-05-27 RX ADMIN — Medication 81 MILLIGRAM(S): at 09:07

## 2018-05-27 RX ADMIN — METFORMIN HYDROCHLORIDE 500 MILLIGRAM(S): 850 TABLET ORAL at 16:52

## 2018-05-27 RX ADMIN — Medication 75 MICROGRAM(S): at 09:07

## 2018-05-27 RX ADMIN — LEVOCARNITINE 660 MILLIGRAM(S): 330 TABLET ORAL at 09:07

## 2018-05-27 RX ADMIN — LEVOCARNITINE 660 MILLIGRAM(S): 330 TABLET ORAL at 16:52

## 2018-05-27 RX ADMIN — HALOPERIDOL DECANOATE 5 MILLIGRAM(S): 100 INJECTION INTRAMUSCULAR at 12:39

## 2018-05-27 RX ADMIN — DIVALPROEX SODIUM 750 MILLIGRAM(S): 500 TABLET, DELAYED RELEASE ORAL at 20:17

## 2018-05-27 RX ADMIN — Medication 0.5 MILLIGRAM(S): at 09:07

## 2018-05-27 RX ADMIN — SENNA PLUS 1 TABLET(S): 8.6 TABLET ORAL at 09:07

## 2018-05-27 RX ADMIN — Medication 25 MILLIGRAM(S): at 09:07

## 2018-05-27 RX ADMIN — Medication 0.5 MILLIGRAM(S): at 20:17

## 2018-05-27 RX ADMIN — Medication 1 DROP(S): at 20:23

## 2018-05-27 RX ADMIN — HALOPERIDOL DECANOATE 5 MILLIGRAM(S): 100 INJECTION INTRAMUSCULAR at 20:17

## 2018-05-27 RX ADMIN — METFORMIN HYDROCHLORIDE 500 MILLIGRAM(S): 850 TABLET ORAL at 09:07

## 2018-05-27 RX ADMIN — Medication 1 DROP(S): at 09:07

## 2018-05-27 RX ADMIN — LEVOCARNITINE 660 MILLIGRAM(S): 330 TABLET ORAL at 12:39

## 2018-05-27 RX ADMIN — SENNA PLUS 1 TABLET(S): 8.6 TABLET ORAL at 20:17

## 2018-05-27 RX ADMIN — Medication 25 MILLIGRAM(S): at 20:21

## 2018-05-27 RX ADMIN — HALOPERIDOL DECANOATE 5 MILLIGRAM(S): 100 INJECTION INTRAMUSCULAR at 05:21

## 2018-05-28 LAB
GLUCOSE BLDC GLUCOMTR-MCNC: 131 MG/DL — HIGH (ref 70–99)
GLUCOSE BLDC GLUCOMTR-MCNC: 185 MG/DL — HIGH (ref 70–99)
GLUCOSE BLDC GLUCOMTR-MCNC: 187 MG/DL — HIGH (ref 70–99)
GLUCOSE BLDC GLUCOMTR-MCNC: 78 MG/DL — SIGNIFICANT CHANGE UP (ref 70–99)

## 2018-05-28 PROCEDURE — 99232 SBSQ HOSP IP/OBS MODERATE 35: CPT

## 2018-05-28 RX ADMIN — HALOPERIDOL DECANOATE 5 MILLIGRAM(S): 100 INJECTION INTRAMUSCULAR at 13:18

## 2018-05-28 RX ADMIN — LEVOCARNITINE 660 MILLIGRAM(S): 330 TABLET ORAL at 09:23

## 2018-05-28 RX ADMIN — Medication 25 MILLIGRAM(S): at 22:05

## 2018-05-28 RX ADMIN — Medication 30 MILLILITER(S): at 15:10

## 2018-05-28 RX ADMIN — SENNA PLUS 1 TABLET(S): 8.6 TABLET ORAL at 09:23

## 2018-05-28 RX ADMIN — Medication 25 MILLIGRAM(S): at 09:23

## 2018-05-28 RX ADMIN — Medication 81 MILLIGRAM(S): at 09:23

## 2018-05-28 RX ADMIN — LEVOCARNITINE 660 MILLIGRAM(S): 330 TABLET ORAL at 13:18

## 2018-05-28 RX ADMIN — SENNA PLUS 1 TABLET(S): 8.6 TABLET ORAL at 22:05

## 2018-05-28 RX ADMIN — LISINOPRIL 10 MILLIGRAM(S): 2.5 TABLET ORAL at 09:23

## 2018-05-28 RX ADMIN — Medication 75 MICROGRAM(S): at 09:23

## 2018-05-28 RX ADMIN — METFORMIN HYDROCHLORIDE 500 MILLIGRAM(S): 850 TABLET ORAL at 09:23

## 2018-05-28 RX ADMIN — Medication 1 DROP(S): at 22:07

## 2018-05-28 RX ADMIN — LEVOCARNITINE 660 MILLIGRAM(S): 330 TABLET ORAL at 17:08

## 2018-05-28 RX ADMIN — Medication 1 DROP(S): at 09:32

## 2018-05-28 RX ADMIN — LACTULOSE 10 GRAM(S): 10 SOLUTION ORAL at 22:05

## 2018-05-28 RX ADMIN — Medication 1: at 17:08

## 2018-05-28 RX ADMIN — HALOPERIDOL DECANOATE 5 MILLIGRAM(S): 100 INJECTION INTRAMUSCULAR at 06:26

## 2018-05-28 RX ADMIN — DIVALPROEX SODIUM 750 MILLIGRAM(S): 500 TABLET, DELAYED RELEASE ORAL at 22:06

## 2018-05-28 RX ADMIN — Medication 0.5 MILLIGRAM(S): at 22:06

## 2018-05-28 RX ADMIN — METFORMIN HYDROCHLORIDE 500 MILLIGRAM(S): 850 TABLET ORAL at 17:08

## 2018-05-28 RX ADMIN — HALOPERIDOL DECANOATE 5 MILLIGRAM(S): 100 INJECTION INTRAMUSCULAR at 22:05

## 2018-05-28 RX ADMIN — Medication 0.5 MILLIGRAM(S): at 09:23

## 2018-05-29 LAB
GLUCOSE BLDC GLUCOMTR-MCNC: 115 MG/DL — HIGH (ref 70–99)
GLUCOSE BLDC GLUCOMTR-MCNC: 128 MG/DL — HIGH (ref 70–99)
GLUCOSE BLDC GLUCOMTR-MCNC: 134 MG/DL — HIGH (ref 70–99)
GLUCOSE BLDC GLUCOMTR-MCNC: 139 MG/DL — HIGH (ref 70–99)

## 2018-05-29 PROCEDURE — 99232 SBSQ HOSP IP/OBS MODERATE 35: CPT

## 2018-05-29 RX ORDER — HALOPERIDOL DECANOATE 100 MG/ML
6 INJECTION INTRAMUSCULAR
Qty: 0 | Refills: 0 | Status: DISCONTINUED | OUTPATIENT
Start: 2018-05-29 | End: 2018-06-06

## 2018-05-29 RX ADMIN — SENNA PLUS 1 TABLET(S): 8.6 TABLET ORAL at 08:46

## 2018-05-29 RX ADMIN — LEVOCARNITINE 660 MILLIGRAM(S): 330 TABLET ORAL at 13:09

## 2018-05-29 RX ADMIN — LEVOCARNITINE 660 MILLIGRAM(S): 330 TABLET ORAL at 17:37

## 2018-05-29 RX ADMIN — Medication 81 MILLIGRAM(S): at 08:46

## 2018-05-29 RX ADMIN — HALOPERIDOL DECANOATE 6 MILLIGRAM(S): 100 INJECTION INTRAMUSCULAR at 20:39

## 2018-05-29 RX ADMIN — METFORMIN HYDROCHLORIDE 500 MILLIGRAM(S): 850 TABLET ORAL at 08:46

## 2018-05-29 RX ADMIN — Medication 25 MILLIGRAM(S): at 20:38

## 2018-05-29 RX ADMIN — Medication 1 DROP(S): at 21:30

## 2018-05-29 RX ADMIN — Medication 75 MICROGRAM(S): at 08:46

## 2018-05-29 RX ADMIN — DIVALPROEX SODIUM 750 MILLIGRAM(S): 500 TABLET, DELAYED RELEASE ORAL at 20:39

## 2018-05-29 RX ADMIN — HALOPERIDOL DECANOATE 5 MILLIGRAM(S): 100 INJECTION INTRAMUSCULAR at 05:22

## 2018-05-29 RX ADMIN — HALOPERIDOL DECANOATE 6 MILLIGRAM(S): 100 INJECTION INTRAMUSCULAR at 13:09

## 2018-05-29 RX ADMIN — Medication 0.5 MILLIGRAM(S): at 20:39

## 2018-05-29 RX ADMIN — LEVOCARNITINE 660 MILLIGRAM(S): 330 TABLET ORAL at 08:46

## 2018-05-29 RX ADMIN — Medication 0.5 MILLIGRAM(S): at 08:46

## 2018-05-29 RX ADMIN — LACTULOSE 10 GRAM(S): 10 SOLUTION ORAL at 20:38

## 2018-05-29 RX ADMIN — LISINOPRIL 10 MILLIGRAM(S): 2.5 TABLET ORAL at 08:46

## 2018-05-29 RX ADMIN — Medication 1 SPRAY(S): at 16:38

## 2018-05-29 RX ADMIN — METFORMIN HYDROCHLORIDE 500 MILLIGRAM(S): 850 TABLET ORAL at 17:37

## 2018-05-29 RX ADMIN — SENNA PLUS 1 TABLET(S): 8.6 TABLET ORAL at 20:38

## 2018-05-29 RX ADMIN — Medication 1 DROP(S): at 08:46

## 2018-05-29 RX ADMIN — Medication 25 MILLIGRAM(S): at 08:46

## 2018-05-30 LAB
GLUCOSE BLDC GLUCOMTR-MCNC: 107 MG/DL — HIGH (ref 70–99)
GLUCOSE BLDC GLUCOMTR-MCNC: 137 MG/DL — HIGH (ref 70–99)
GLUCOSE BLDC GLUCOMTR-MCNC: 147 MG/DL — HIGH (ref 70–99)
GLUCOSE BLDC GLUCOMTR-MCNC: 200 MG/DL — HIGH (ref 70–99)

## 2018-05-30 PROCEDURE — 99232 SBSQ HOSP IP/OBS MODERATE 35: CPT

## 2018-05-30 RX ADMIN — SENNA PLUS 1 TABLET(S): 8.6 TABLET ORAL at 09:58

## 2018-05-30 RX ADMIN — METFORMIN HYDROCHLORIDE 500 MILLIGRAM(S): 850 TABLET ORAL at 07:57

## 2018-05-30 RX ADMIN — Medication 1 DROP(S): at 20:47

## 2018-05-30 RX ADMIN — Medication 1 DROP(S): at 09:19

## 2018-05-30 RX ADMIN — LACTULOSE 10 GRAM(S): 10 SOLUTION ORAL at 20:51

## 2018-05-30 RX ADMIN — LEVOCARNITINE 660 MILLIGRAM(S): 330 TABLET ORAL at 17:12

## 2018-05-30 RX ADMIN — DIVALPROEX SODIUM 750 MILLIGRAM(S): 500 TABLET, DELAYED RELEASE ORAL at 20:52

## 2018-05-30 RX ADMIN — Medication 25 MILLIGRAM(S): at 09:58

## 2018-05-30 RX ADMIN — Medication 0.5 MILLIGRAM(S): at 09:01

## 2018-05-30 RX ADMIN — Medication 81 MILLIGRAM(S): at 09:01

## 2018-05-30 RX ADMIN — HALOPERIDOL DECANOATE 6 MILLIGRAM(S): 100 INJECTION INTRAMUSCULAR at 12:46

## 2018-05-30 RX ADMIN — LISINOPRIL 10 MILLIGRAM(S): 2.5 TABLET ORAL at 09:58

## 2018-05-30 RX ADMIN — Medication 0.5 MILLIGRAM(S): at 20:52

## 2018-05-30 RX ADMIN — LEVOCARNITINE 660 MILLIGRAM(S): 330 TABLET ORAL at 12:46

## 2018-05-30 RX ADMIN — Medication 75 MICROGRAM(S): at 07:57

## 2018-05-30 RX ADMIN — SENNA PLUS 1 TABLET(S): 8.6 TABLET ORAL at 20:51

## 2018-05-30 RX ADMIN — HALOPERIDOL DECANOATE 6 MILLIGRAM(S): 100 INJECTION INTRAMUSCULAR at 20:51

## 2018-05-30 RX ADMIN — LEVOCARNITINE 660 MILLIGRAM(S): 330 TABLET ORAL at 07:57

## 2018-05-30 RX ADMIN — Medication 25 MILLIGRAM(S): at 20:51

## 2018-05-30 RX ADMIN — HALOPERIDOL DECANOATE 6 MILLIGRAM(S): 100 INJECTION INTRAMUSCULAR at 06:23

## 2018-05-30 RX ADMIN — METFORMIN HYDROCHLORIDE 500 MILLIGRAM(S): 850 TABLET ORAL at 17:12

## 2018-05-31 LAB
GLUCOSE BLDC GLUCOMTR-MCNC: 124 MG/DL — HIGH (ref 70–99)
GLUCOSE BLDC GLUCOMTR-MCNC: 133 MG/DL — HIGH (ref 70–99)
GLUCOSE BLDC GLUCOMTR-MCNC: 138 MG/DL — HIGH (ref 70–99)
GLUCOSE BLDC GLUCOMTR-MCNC: 167 MG/DL — HIGH (ref 70–99)

## 2018-05-31 PROCEDURE — 99232 SBSQ HOSP IP/OBS MODERATE 35: CPT

## 2018-05-31 RX ORDER — ACETAMINOPHEN 500 MG
650 TABLET ORAL ONCE
Qty: 0 | Refills: 0 | Status: COMPLETED | OUTPATIENT
Start: 2018-05-31 | End: 2018-05-31

## 2018-05-31 RX ADMIN — Medication 1 SPRAY(S): at 08:41

## 2018-05-31 RX ADMIN — LEVOCARNITINE 660 MILLIGRAM(S): 330 TABLET ORAL at 17:12

## 2018-05-31 RX ADMIN — Medication 1: at 17:12

## 2018-05-31 RX ADMIN — Medication 25 MILLIGRAM(S): at 20:36

## 2018-05-31 RX ADMIN — Medication 650 MILLIGRAM(S): at 12:44

## 2018-05-31 RX ADMIN — LEVOCARNITINE 660 MILLIGRAM(S): 330 TABLET ORAL at 11:44

## 2018-05-31 RX ADMIN — Medication 650 MILLIGRAM(S): at 11:44

## 2018-05-31 RX ADMIN — Medication 75 MICROGRAM(S): at 08:41

## 2018-05-31 RX ADMIN — HALOPERIDOL DECANOATE 6 MILLIGRAM(S): 100 INJECTION INTRAMUSCULAR at 06:09

## 2018-05-31 RX ADMIN — LISINOPRIL 10 MILLIGRAM(S): 2.5 TABLET ORAL at 08:41

## 2018-05-31 RX ADMIN — LEVOCARNITINE 660 MILLIGRAM(S): 330 TABLET ORAL at 08:41

## 2018-05-31 RX ADMIN — LACTULOSE 10 GRAM(S): 10 SOLUTION ORAL at 20:36

## 2018-05-31 RX ADMIN — HALOPERIDOL DECANOATE 6 MILLIGRAM(S): 100 INJECTION INTRAMUSCULAR at 11:44

## 2018-05-31 RX ADMIN — SENNA PLUS 1 TABLET(S): 8.6 TABLET ORAL at 08:41

## 2018-05-31 RX ADMIN — METFORMIN HYDROCHLORIDE 500 MILLIGRAM(S): 850 TABLET ORAL at 08:41

## 2018-05-31 RX ADMIN — METFORMIN HYDROCHLORIDE 500 MILLIGRAM(S): 850 TABLET ORAL at 17:12

## 2018-05-31 RX ADMIN — Medication 0.5 MILLIGRAM(S): at 20:36

## 2018-05-31 RX ADMIN — DIVALPROEX SODIUM 750 MILLIGRAM(S): 500 TABLET, DELAYED RELEASE ORAL at 20:36

## 2018-05-31 RX ADMIN — Medication 25 MILLIGRAM(S): at 08:41

## 2018-05-31 RX ADMIN — Medication 1 DROP(S): at 20:29

## 2018-05-31 RX ADMIN — SENNA PLUS 1 TABLET(S): 8.6 TABLET ORAL at 20:36

## 2018-05-31 RX ADMIN — Medication 0.5 MILLIGRAM(S): at 08:41

## 2018-05-31 RX ADMIN — HALOPERIDOL DECANOATE 6 MILLIGRAM(S): 100 INJECTION INTRAMUSCULAR at 20:36

## 2018-05-31 RX ADMIN — Medication 81 MILLIGRAM(S): at 08:41

## 2018-05-31 RX ADMIN — Medication 1 DROP(S): at 08:41

## 2018-06-01 LAB
GLUCOSE BLDC GLUCOMTR-MCNC: 125 MG/DL — HIGH (ref 70–99)
GLUCOSE BLDC GLUCOMTR-MCNC: 128 MG/DL — HIGH (ref 70–99)
GLUCOSE BLDC GLUCOMTR-MCNC: 135 MG/DL — HIGH (ref 70–99)
GLUCOSE BLDC GLUCOMTR-MCNC: 179 MG/DL — HIGH (ref 70–99)

## 2018-06-01 PROCEDURE — 99232 SBSQ HOSP IP/OBS MODERATE 35: CPT

## 2018-06-01 RX ADMIN — SENNA PLUS 1 TABLET(S): 8.6 TABLET ORAL at 20:50

## 2018-06-01 RX ADMIN — Medication 1 DROP(S): at 09:14

## 2018-06-01 RX ADMIN — HALOPERIDOL DECANOATE 6 MILLIGRAM(S): 100 INJECTION INTRAMUSCULAR at 06:16

## 2018-06-01 RX ADMIN — METFORMIN HYDROCHLORIDE 500 MILLIGRAM(S): 850 TABLET ORAL at 09:14

## 2018-06-01 RX ADMIN — LACTULOSE 10 GRAM(S): 10 SOLUTION ORAL at 20:50

## 2018-06-01 RX ADMIN — Medication 0.5 MILLIGRAM(S): at 20:50

## 2018-06-01 RX ADMIN — DIVALPROEX SODIUM 750 MILLIGRAM(S): 500 TABLET, DELAYED RELEASE ORAL at 20:50

## 2018-06-01 RX ADMIN — LEVOCARNITINE 660 MILLIGRAM(S): 330 TABLET ORAL at 09:14

## 2018-06-01 RX ADMIN — Medication 25 MILLIGRAM(S): at 09:14

## 2018-06-01 RX ADMIN — Medication 81 MILLIGRAM(S): at 09:14

## 2018-06-01 RX ADMIN — Medication 1 DROP(S): at 20:49

## 2018-06-01 RX ADMIN — Medication 0.5 MILLIGRAM(S): at 09:14

## 2018-06-01 RX ADMIN — HALOPERIDOL DECANOATE 6 MILLIGRAM(S): 100 INJECTION INTRAMUSCULAR at 11:53

## 2018-06-01 RX ADMIN — Medication 75 MICROGRAM(S): at 09:14

## 2018-06-01 RX ADMIN — SENNA PLUS 1 TABLET(S): 8.6 TABLET ORAL at 09:14

## 2018-06-01 RX ADMIN — LEVOCARNITINE 660 MILLIGRAM(S): 330 TABLET ORAL at 11:53

## 2018-06-01 RX ADMIN — LEVOCARNITINE 660 MILLIGRAM(S): 330 TABLET ORAL at 17:25

## 2018-06-01 RX ADMIN — METFORMIN HYDROCHLORIDE 500 MILLIGRAM(S): 850 TABLET ORAL at 17:25

## 2018-06-01 RX ADMIN — HALOPERIDOL DECANOATE 6 MILLIGRAM(S): 100 INJECTION INTRAMUSCULAR at 20:50

## 2018-06-01 RX ADMIN — LISINOPRIL 10 MILLIGRAM(S): 2.5 TABLET ORAL at 09:14

## 2018-06-01 RX ADMIN — Medication 25 MILLIGRAM(S): at 20:50

## 2018-06-02 LAB
GLUCOSE BLDC GLUCOMTR-MCNC: 108 MG/DL — HIGH (ref 70–99)
GLUCOSE BLDC GLUCOMTR-MCNC: 148 MG/DL — HIGH (ref 70–99)
GLUCOSE BLDC GLUCOMTR-MCNC: 152 MG/DL — HIGH (ref 70–99)
GLUCOSE BLDC GLUCOMTR-MCNC: 168 MG/DL — HIGH (ref 70–99)

## 2018-06-02 RX ADMIN — LEVOCARNITINE 660 MILLIGRAM(S): 330 TABLET ORAL at 12:36

## 2018-06-02 RX ADMIN — HALOPERIDOL DECANOATE 6 MILLIGRAM(S): 100 INJECTION INTRAMUSCULAR at 12:36

## 2018-06-02 RX ADMIN — SENNA PLUS 1 TABLET(S): 8.6 TABLET ORAL at 08:48

## 2018-06-02 RX ADMIN — LEVOCARNITINE 660 MILLIGRAM(S): 330 TABLET ORAL at 08:48

## 2018-06-02 RX ADMIN — SENNA PLUS 1 TABLET(S): 8.6 TABLET ORAL at 21:33

## 2018-06-02 RX ADMIN — Medication 1 DROP(S): at 08:48

## 2018-06-02 RX ADMIN — LISINOPRIL 10 MILLIGRAM(S): 2.5 TABLET ORAL at 08:48

## 2018-06-02 RX ADMIN — METFORMIN HYDROCHLORIDE 500 MILLIGRAM(S): 850 TABLET ORAL at 08:48

## 2018-06-02 RX ADMIN — METFORMIN HYDROCHLORIDE 500 MILLIGRAM(S): 850 TABLET ORAL at 17:40

## 2018-06-02 RX ADMIN — Medication 25 MILLIGRAM(S): at 08:48

## 2018-06-02 RX ADMIN — Medication 75 MICROGRAM(S): at 08:48

## 2018-06-02 RX ADMIN — DIVALPROEX SODIUM 750 MILLIGRAM(S): 500 TABLET, DELAYED RELEASE ORAL at 21:34

## 2018-06-02 RX ADMIN — Medication 1: at 17:30

## 2018-06-02 RX ADMIN — LACTULOSE 10 GRAM(S): 10 SOLUTION ORAL at 21:30

## 2018-06-02 RX ADMIN — HALOPERIDOL DECANOATE 6 MILLIGRAM(S): 100 INJECTION INTRAMUSCULAR at 06:49

## 2018-06-02 RX ADMIN — Medication 25 MILLIGRAM(S): at 21:33

## 2018-06-02 RX ADMIN — LEVOCARNITINE 660 MILLIGRAM(S): 330 TABLET ORAL at 17:40

## 2018-06-02 RX ADMIN — Medication 0.5 MILLIGRAM(S): at 08:48

## 2018-06-02 RX ADMIN — Medication 0.5 MILLIGRAM(S): at 21:33

## 2018-06-02 RX ADMIN — Medication 81 MILLIGRAM(S): at 08:48

## 2018-06-02 RX ADMIN — HALOPERIDOL DECANOATE 6 MILLIGRAM(S): 100 INJECTION INTRAMUSCULAR at 21:33

## 2018-06-02 RX ADMIN — Medication 1 DROP(S): at 21:29

## 2018-06-03 LAB
GLUCOSE BLDC GLUCOMTR-MCNC: 119 MG/DL — HIGH (ref 70–99)
GLUCOSE BLDC GLUCOMTR-MCNC: 177 MG/DL — HIGH (ref 70–99)
GLUCOSE BLDC GLUCOMTR-MCNC: 190 MG/DL — HIGH (ref 70–99)
GLUCOSE BLDC GLUCOMTR-MCNC: 206 MG/DL — HIGH (ref 70–99)

## 2018-06-03 RX ADMIN — LISINOPRIL 10 MILLIGRAM(S): 2.5 TABLET ORAL at 09:37

## 2018-06-03 RX ADMIN — LACTULOSE 10 GRAM(S): 10 SOLUTION ORAL at 20:41

## 2018-06-03 RX ADMIN — Medication 0.5 MILLIGRAM(S): at 20:43

## 2018-06-03 RX ADMIN — HALOPERIDOL DECANOATE 6 MILLIGRAM(S): 100 INJECTION INTRAMUSCULAR at 20:43

## 2018-06-03 RX ADMIN — Medication 0.5 MILLIGRAM(S): at 09:37

## 2018-06-03 RX ADMIN — Medication 75 MICROGRAM(S): at 09:37

## 2018-06-03 RX ADMIN — LEVOCARNITINE 660 MILLIGRAM(S): 330 TABLET ORAL at 17:10

## 2018-06-03 RX ADMIN — HALOPERIDOL DECANOATE 6 MILLIGRAM(S): 100 INJECTION INTRAMUSCULAR at 06:00

## 2018-06-03 RX ADMIN — Medication 25 MILLIGRAM(S): at 20:44

## 2018-06-03 RX ADMIN — SENNA PLUS 1 TABLET(S): 8.6 TABLET ORAL at 20:44

## 2018-06-03 RX ADMIN — SENNA PLUS 1 TABLET(S): 8.6 TABLET ORAL at 09:37

## 2018-06-03 RX ADMIN — Medication 1 DROP(S): at 20:42

## 2018-06-03 RX ADMIN — DIVALPROEX SODIUM 750 MILLIGRAM(S): 500 TABLET, DELAYED RELEASE ORAL at 20:45

## 2018-06-03 RX ADMIN — Medication 1 DROP(S): at 09:37

## 2018-06-03 RX ADMIN — LEVOCARNITINE 660 MILLIGRAM(S): 330 TABLET ORAL at 12:45

## 2018-06-03 RX ADMIN — Medication 81 MILLIGRAM(S): at 09:37

## 2018-06-03 RX ADMIN — METFORMIN HYDROCHLORIDE 500 MILLIGRAM(S): 850 TABLET ORAL at 17:10

## 2018-06-03 RX ADMIN — Medication 25 MILLIGRAM(S): at 09:37

## 2018-06-03 RX ADMIN — LEVOCARNITINE 660 MILLIGRAM(S): 330 TABLET ORAL at 09:37

## 2018-06-03 RX ADMIN — Medication 1: at 17:10

## 2018-06-03 RX ADMIN — HALOPERIDOL DECANOATE 6 MILLIGRAM(S): 100 INJECTION INTRAMUSCULAR at 12:48

## 2018-06-03 RX ADMIN — METFORMIN HYDROCHLORIDE 500 MILLIGRAM(S): 850 TABLET ORAL at 09:37

## 2018-06-03 RX ADMIN — Medication 1: at 12:44

## 2018-06-04 LAB
GLUCOSE BLDC GLUCOMTR-MCNC: 108 MG/DL — HIGH (ref 70–99)
GLUCOSE BLDC GLUCOMTR-MCNC: 117 MG/DL — HIGH (ref 70–99)
GLUCOSE BLDC GLUCOMTR-MCNC: 123 MG/DL — HIGH (ref 70–99)
GLUCOSE BLDC GLUCOMTR-MCNC: 192 MG/DL — HIGH (ref 70–99)

## 2018-06-04 PROCEDURE — 99232 SBSQ HOSP IP/OBS MODERATE 35: CPT

## 2018-06-04 RX ADMIN — LEVOCARNITINE 660 MILLIGRAM(S): 330 TABLET ORAL at 17:16

## 2018-06-04 RX ADMIN — Medication 0.5 MILLIGRAM(S): at 20:35

## 2018-06-04 RX ADMIN — HALOPERIDOL DECANOATE 6 MILLIGRAM(S): 100 INJECTION INTRAMUSCULAR at 13:42

## 2018-06-04 RX ADMIN — METFORMIN HYDROCHLORIDE 500 MILLIGRAM(S): 850 TABLET ORAL at 17:16

## 2018-06-04 RX ADMIN — Medication 81 MILLIGRAM(S): at 09:00

## 2018-06-04 RX ADMIN — LACTULOSE 10 GRAM(S): 10 SOLUTION ORAL at 20:35

## 2018-06-04 RX ADMIN — LEVOCARNITINE 660 MILLIGRAM(S): 330 TABLET ORAL at 09:01

## 2018-06-04 RX ADMIN — Medication 25 MILLIGRAM(S): at 09:01

## 2018-06-04 RX ADMIN — LISINOPRIL 10 MILLIGRAM(S): 2.5 TABLET ORAL at 09:01

## 2018-06-04 RX ADMIN — Medication 75 MICROGRAM(S): at 09:01

## 2018-06-04 RX ADMIN — SENNA PLUS 1 TABLET(S): 8.6 TABLET ORAL at 20:35

## 2018-06-04 RX ADMIN — Medication 1: at 17:16

## 2018-06-04 RX ADMIN — Medication 1 DROP(S): at 09:00

## 2018-06-04 RX ADMIN — METFORMIN HYDROCHLORIDE 500 MILLIGRAM(S): 850 TABLET ORAL at 09:01

## 2018-06-04 RX ADMIN — LEVOCARNITINE 660 MILLIGRAM(S): 330 TABLET ORAL at 13:46

## 2018-06-04 RX ADMIN — Medication 0.5 MILLIGRAM(S): at 09:00

## 2018-06-04 RX ADMIN — SENNA PLUS 1 TABLET(S): 8.6 TABLET ORAL at 09:07

## 2018-06-04 RX ADMIN — Medication 1 DROP(S): at 20:36

## 2018-06-04 RX ADMIN — HALOPERIDOL DECANOATE 6 MILLIGRAM(S): 100 INJECTION INTRAMUSCULAR at 06:14

## 2018-06-04 RX ADMIN — HALOPERIDOL DECANOATE 6 MILLIGRAM(S): 100 INJECTION INTRAMUSCULAR at 20:35

## 2018-06-04 RX ADMIN — Medication 25 MILLIGRAM(S): at 20:35

## 2018-06-04 RX ADMIN — DIVALPROEX SODIUM 750 MILLIGRAM(S): 500 TABLET, DELAYED RELEASE ORAL at 20:35

## 2018-06-05 LAB
APPEARANCE UR: CLEAR — SIGNIFICANT CHANGE UP
BILIRUB UR-MCNC: NEGATIVE — SIGNIFICANT CHANGE UP
BLOOD UR QL VISUAL: NEGATIVE — SIGNIFICANT CHANGE UP
COLOR SPEC: SIGNIFICANT CHANGE UP
GLUCOSE BLDC GLUCOMTR-MCNC: 125 MG/DL — HIGH (ref 70–99)
GLUCOSE BLDC GLUCOMTR-MCNC: 129 MG/DL — HIGH (ref 70–99)
GLUCOSE BLDC GLUCOMTR-MCNC: 134 MG/DL — HIGH (ref 70–99)
GLUCOSE UR-MCNC: NEGATIVE — SIGNIFICANT CHANGE UP
KETONES UR-MCNC: NEGATIVE — SIGNIFICANT CHANGE UP
LEUKOCYTE ESTERASE UR-ACNC: NEGATIVE — SIGNIFICANT CHANGE UP
MUCOUS THREADS # UR AUTO: SIGNIFICANT CHANGE UP
NITRITE UR-MCNC: NEGATIVE — SIGNIFICANT CHANGE UP
PH UR: 7 — SIGNIFICANT CHANGE UP (ref 4.6–8)
PROT UR-MCNC: NEGATIVE MG/DL — SIGNIFICANT CHANGE UP
RBC CASTS # UR COMP ASSIST: SIGNIFICANT CHANGE UP (ref 0–?)
SP GR SPEC: 1.01 — SIGNIFICANT CHANGE UP (ref 1–1.04)
UROBILINOGEN FLD QL: NORMAL MG/DL — SIGNIFICANT CHANGE UP
WBC UR QL: SIGNIFICANT CHANGE UP (ref 0–?)

## 2018-06-05 PROCEDURE — 99232 SBSQ HOSP IP/OBS MODERATE 35: CPT

## 2018-06-05 RX ADMIN — Medication 25 MILLIGRAM(S): at 20:45

## 2018-06-05 RX ADMIN — HALOPERIDOL DECANOATE 6 MILLIGRAM(S): 100 INJECTION INTRAMUSCULAR at 12:43

## 2018-06-05 RX ADMIN — Medication 0.5 MILLIGRAM(S): at 09:27

## 2018-06-05 RX ADMIN — SENNA PLUS 1 TABLET(S): 8.6 TABLET ORAL at 09:27

## 2018-06-05 RX ADMIN — LACTULOSE 10 GRAM(S): 10 SOLUTION ORAL at 20:48

## 2018-06-05 RX ADMIN — Medication 81 MILLIGRAM(S): at 09:27

## 2018-06-05 RX ADMIN — Medication 1 DROP(S): at 20:44

## 2018-06-05 RX ADMIN — HALOPERIDOL DECANOATE 6 MILLIGRAM(S): 100 INJECTION INTRAMUSCULAR at 06:15

## 2018-06-05 RX ADMIN — METFORMIN HYDROCHLORIDE 500 MILLIGRAM(S): 850 TABLET ORAL at 09:27

## 2018-06-05 RX ADMIN — Medication 25 MILLIGRAM(S): at 09:27

## 2018-06-05 RX ADMIN — METFORMIN HYDROCHLORIDE 500 MILLIGRAM(S): 850 TABLET ORAL at 17:46

## 2018-06-05 RX ADMIN — Medication 0.5 MILLIGRAM(S): at 20:48

## 2018-06-05 RX ADMIN — SENNA PLUS 1 TABLET(S): 8.6 TABLET ORAL at 20:45

## 2018-06-05 RX ADMIN — LEVOCARNITINE 660 MILLIGRAM(S): 330 TABLET ORAL at 17:46

## 2018-06-05 RX ADMIN — LEVOCARNITINE 660 MILLIGRAM(S): 330 TABLET ORAL at 09:27

## 2018-06-05 RX ADMIN — Medication 75 MICROGRAM(S): at 09:27

## 2018-06-05 RX ADMIN — LISINOPRIL 10 MILLIGRAM(S): 2.5 TABLET ORAL at 09:27

## 2018-06-05 RX ADMIN — DIVALPROEX SODIUM 750 MILLIGRAM(S): 500 TABLET, DELAYED RELEASE ORAL at 20:48

## 2018-06-05 RX ADMIN — Medication 1 DROP(S): at 12:03

## 2018-06-05 RX ADMIN — HALOPERIDOL DECANOATE 6 MILLIGRAM(S): 100 INJECTION INTRAMUSCULAR at 20:45

## 2018-06-05 RX ADMIN — LEVOCARNITINE 660 MILLIGRAM(S): 330 TABLET ORAL at 12:43

## 2018-06-06 LAB
GLUCOSE BLDC GLUCOMTR-MCNC: 107 MG/DL — HIGH (ref 70–99)
GLUCOSE BLDC GLUCOMTR-MCNC: 138 MG/DL — HIGH (ref 70–99)
GLUCOSE BLDC GLUCOMTR-MCNC: 176 MG/DL — HIGH (ref 70–99)

## 2018-06-06 PROCEDURE — 99232 SBSQ HOSP IP/OBS MODERATE 35: CPT

## 2018-06-06 RX ORDER — HALOPERIDOL DECANOATE 100 MG/ML
7 INJECTION INTRAMUSCULAR
Qty: 0 | Refills: 0 | Status: DISCONTINUED | OUTPATIENT
Start: 2018-06-06 | End: 2018-06-15

## 2018-06-06 RX ADMIN — Medication 0.5 MILLIGRAM(S): at 09:04

## 2018-06-06 RX ADMIN — LISINOPRIL 10 MILLIGRAM(S): 2.5 TABLET ORAL at 09:04

## 2018-06-06 RX ADMIN — Medication 1 DROP(S): at 20:32

## 2018-06-06 RX ADMIN — LEVOCARNITINE 660 MILLIGRAM(S): 330 TABLET ORAL at 09:04

## 2018-06-06 RX ADMIN — SENNA PLUS 1 TABLET(S): 8.6 TABLET ORAL at 20:25

## 2018-06-06 RX ADMIN — Medication 25 MILLIGRAM(S): at 09:04

## 2018-06-06 RX ADMIN — Medication 0.5 MILLIGRAM(S): at 20:25

## 2018-06-06 RX ADMIN — Medication 1 DROP(S): at 09:04

## 2018-06-06 RX ADMIN — Medication 81 MILLIGRAM(S): at 09:04

## 2018-06-06 RX ADMIN — HALOPERIDOL DECANOATE 7 MILLIGRAM(S): 100 INJECTION INTRAMUSCULAR at 12:01

## 2018-06-06 RX ADMIN — LEVOCARNITINE 660 MILLIGRAM(S): 330 TABLET ORAL at 12:01

## 2018-06-06 RX ADMIN — HALOPERIDOL DECANOATE 6 MILLIGRAM(S): 100 INJECTION INTRAMUSCULAR at 06:27

## 2018-06-06 RX ADMIN — METFORMIN HYDROCHLORIDE 500 MILLIGRAM(S): 850 TABLET ORAL at 17:43

## 2018-06-06 RX ADMIN — METFORMIN HYDROCHLORIDE 500 MILLIGRAM(S): 850 TABLET ORAL at 09:04

## 2018-06-06 RX ADMIN — LEVOCARNITINE 660 MILLIGRAM(S): 330 TABLET ORAL at 17:43

## 2018-06-06 RX ADMIN — SENNA PLUS 1 TABLET(S): 8.6 TABLET ORAL at 09:04

## 2018-06-06 RX ADMIN — Medication 75 MICROGRAM(S): at 09:04

## 2018-06-06 RX ADMIN — Medication 25 MILLIGRAM(S): at 20:25

## 2018-06-06 RX ADMIN — DIVALPROEX SODIUM 750 MILLIGRAM(S): 500 TABLET, DELAYED RELEASE ORAL at 20:25

## 2018-06-06 RX ADMIN — LACTULOSE 10 GRAM(S): 10 SOLUTION ORAL at 20:25

## 2018-06-06 RX ADMIN — HALOPERIDOL DECANOATE 7 MILLIGRAM(S): 100 INJECTION INTRAMUSCULAR at 20:25

## 2018-06-07 LAB
GLUCOSE BLDC GLUCOMTR-MCNC: 109 MG/DL — HIGH (ref 70–99)
GLUCOSE BLDC GLUCOMTR-MCNC: 114 MG/DL — HIGH (ref 70–99)
GLUCOSE BLDC GLUCOMTR-MCNC: 120 MG/DL — HIGH (ref 70–99)

## 2018-06-07 PROCEDURE — 99232 SBSQ HOSP IP/OBS MODERATE 35: CPT

## 2018-06-07 RX ORDER — DOCUSATE SODIUM 100 MG
100 CAPSULE ORAL THREE TIMES A DAY
Qty: 0 | Refills: 0 | Status: DISCONTINUED | OUTPATIENT
Start: 2018-06-07 | End: 2018-06-15

## 2018-06-07 RX ADMIN — Medication 1 DROP(S): at 20:24

## 2018-06-07 RX ADMIN — Medication 100 MILLIGRAM(S): at 09:21

## 2018-06-07 RX ADMIN — HALOPERIDOL DECANOATE 7 MILLIGRAM(S): 100 INJECTION INTRAMUSCULAR at 13:07

## 2018-06-07 RX ADMIN — Medication 75 MICROGRAM(S): at 09:25

## 2018-06-07 RX ADMIN — LEVOCARNITINE 660 MILLIGRAM(S): 330 TABLET ORAL at 13:07

## 2018-06-07 RX ADMIN — METFORMIN HYDROCHLORIDE 500 MILLIGRAM(S): 850 TABLET ORAL at 09:25

## 2018-06-07 RX ADMIN — METFORMIN HYDROCHLORIDE 500 MILLIGRAM(S): 850 TABLET ORAL at 17:25

## 2018-06-07 RX ADMIN — LACTULOSE 10 GRAM(S): 10 SOLUTION ORAL at 20:29

## 2018-06-07 RX ADMIN — LISINOPRIL 10 MILLIGRAM(S): 2.5 TABLET ORAL at 09:25

## 2018-06-07 RX ADMIN — Medication 81 MILLIGRAM(S): at 09:25

## 2018-06-07 RX ADMIN — Medication 100 MILLIGRAM(S): at 13:07

## 2018-06-07 RX ADMIN — HALOPERIDOL DECANOATE 7 MILLIGRAM(S): 100 INJECTION INTRAMUSCULAR at 20:29

## 2018-06-07 RX ADMIN — SENNA PLUS 1 TABLET(S): 8.6 TABLET ORAL at 20:29

## 2018-06-07 RX ADMIN — LEVOCARNITINE 660 MILLIGRAM(S): 330 TABLET ORAL at 17:25

## 2018-06-07 RX ADMIN — DIVALPROEX SODIUM 750 MILLIGRAM(S): 500 TABLET, DELAYED RELEASE ORAL at 20:29

## 2018-06-07 RX ADMIN — Medication 0.5 MILLIGRAM(S): at 20:29

## 2018-06-07 RX ADMIN — Medication 25 MILLIGRAM(S): at 20:29

## 2018-06-07 RX ADMIN — Medication 25 MILLIGRAM(S): at 09:25

## 2018-06-07 RX ADMIN — Medication 100 MILLIGRAM(S): at 20:29

## 2018-06-07 RX ADMIN — Medication 1 DROP(S): at 09:25

## 2018-06-07 RX ADMIN — HALOPERIDOL DECANOATE 7 MILLIGRAM(S): 100 INJECTION INTRAMUSCULAR at 06:50

## 2018-06-07 RX ADMIN — LEVOCARNITINE 660 MILLIGRAM(S): 330 TABLET ORAL at 09:25

## 2018-06-07 RX ADMIN — SENNA PLUS 1 TABLET(S): 8.6 TABLET ORAL at 09:25

## 2018-06-07 RX ADMIN — Medication 0.5 MILLIGRAM(S): at 09:21

## 2018-06-08 LAB
GLUCOSE BLDC GLUCOMTR-MCNC: 168 MG/DL — HIGH (ref 70–99)
GLUCOSE BLDC GLUCOMTR-MCNC: 183 MG/DL — HIGH (ref 70–99)
GLUCOSE BLDC GLUCOMTR-MCNC: 224 MG/DL — HIGH (ref 70–99)

## 2018-06-08 PROCEDURE — 99232 SBSQ HOSP IP/OBS MODERATE 35: CPT

## 2018-06-08 RX ADMIN — Medication 0.5 MILLIGRAM(S): at 20:32

## 2018-06-08 RX ADMIN — Medication 100 MILLIGRAM(S): at 20:32

## 2018-06-08 RX ADMIN — HALOPERIDOL DECANOATE 7 MILLIGRAM(S): 100 INJECTION INTRAMUSCULAR at 06:29

## 2018-06-08 RX ADMIN — LEVOCARNITINE 660 MILLIGRAM(S): 330 TABLET ORAL at 17:17

## 2018-06-08 RX ADMIN — Medication 100 MILLIGRAM(S): at 09:09

## 2018-06-08 RX ADMIN — Medication 25 MILLIGRAM(S): at 20:32

## 2018-06-08 RX ADMIN — Medication 1: at 09:03

## 2018-06-08 RX ADMIN — Medication 2: at 13:13

## 2018-06-08 RX ADMIN — HALOPERIDOL DECANOATE 7 MILLIGRAM(S): 100 INJECTION INTRAMUSCULAR at 13:13

## 2018-06-08 RX ADMIN — Medication 25 MILLIGRAM(S): at 09:09

## 2018-06-08 RX ADMIN — METFORMIN HYDROCHLORIDE 500 MILLIGRAM(S): 850 TABLET ORAL at 09:09

## 2018-06-08 RX ADMIN — Medication 1 DROP(S): at 20:29

## 2018-06-08 RX ADMIN — SENNA PLUS 1 TABLET(S): 8.6 TABLET ORAL at 09:09

## 2018-06-08 RX ADMIN — HALOPERIDOL DECANOATE 7 MILLIGRAM(S): 100 INJECTION INTRAMUSCULAR at 20:32

## 2018-06-08 RX ADMIN — LEVOCARNITINE 660 MILLIGRAM(S): 330 TABLET ORAL at 13:13

## 2018-06-08 RX ADMIN — METFORMIN HYDROCHLORIDE 500 MILLIGRAM(S): 850 TABLET ORAL at 17:17

## 2018-06-08 RX ADMIN — Medication 1 DROP(S): at 09:09

## 2018-06-08 RX ADMIN — SENNA PLUS 1 TABLET(S): 8.6 TABLET ORAL at 20:32

## 2018-06-08 RX ADMIN — Medication 1: at 17:16

## 2018-06-08 RX ADMIN — DIVALPROEX SODIUM 750 MILLIGRAM(S): 500 TABLET, DELAYED RELEASE ORAL at 20:32

## 2018-06-08 RX ADMIN — Medication 81 MILLIGRAM(S): at 09:09

## 2018-06-08 RX ADMIN — Medication 75 MICROGRAM(S): at 09:09

## 2018-06-08 RX ADMIN — LEVOCARNITINE 660 MILLIGRAM(S): 330 TABLET ORAL at 09:09

## 2018-06-08 RX ADMIN — LISINOPRIL 10 MILLIGRAM(S): 2.5 TABLET ORAL at 09:09

## 2018-06-08 RX ADMIN — Medication 0.5 MILLIGRAM(S): at 09:09

## 2018-06-08 RX ADMIN — Medication 100 MILLIGRAM(S): at 13:13

## 2018-06-08 RX ADMIN — LACTULOSE 10 GRAM(S): 10 SOLUTION ORAL at 20:20

## 2018-06-09 LAB
GLUCOSE BLDC GLUCOMTR-MCNC: 137 MG/DL — HIGH (ref 70–99)
GLUCOSE BLDC GLUCOMTR-MCNC: 163 MG/DL — HIGH (ref 70–99)
GLUCOSE BLDC GLUCOMTR-MCNC: 197 MG/DL — HIGH (ref 70–99)

## 2018-06-09 RX ADMIN — Medication 1: at 17:29

## 2018-06-09 RX ADMIN — DIVALPROEX SODIUM 750 MILLIGRAM(S): 500 TABLET, DELAYED RELEASE ORAL at 20:52

## 2018-06-09 RX ADMIN — HALOPERIDOL DECANOATE 7 MILLIGRAM(S): 100 INJECTION INTRAMUSCULAR at 20:52

## 2018-06-09 RX ADMIN — LISINOPRIL 10 MILLIGRAM(S): 2.5 TABLET ORAL at 08:57

## 2018-06-09 RX ADMIN — SENNA PLUS 1 TABLET(S): 8.6 TABLET ORAL at 08:58

## 2018-06-09 RX ADMIN — LACTULOSE 10 GRAM(S): 10 SOLUTION ORAL at 20:52

## 2018-06-09 RX ADMIN — Medication 0.5 MILLIGRAM(S): at 20:52

## 2018-06-09 RX ADMIN — Medication 25 MILLIGRAM(S): at 20:52

## 2018-06-09 RX ADMIN — HALOPERIDOL DECANOATE 7 MILLIGRAM(S): 100 INJECTION INTRAMUSCULAR at 06:39

## 2018-06-09 RX ADMIN — Medication 100 MILLIGRAM(S): at 20:52

## 2018-06-09 RX ADMIN — HALOPERIDOL DECANOATE 7 MILLIGRAM(S): 100 INJECTION INTRAMUSCULAR at 13:26

## 2018-06-09 RX ADMIN — Medication 1 DROP(S): at 08:57

## 2018-06-09 RX ADMIN — LEVOCARNITINE 660 MILLIGRAM(S): 330 TABLET ORAL at 13:26

## 2018-06-09 RX ADMIN — Medication 25 MILLIGRAM(S): at 08:58

## 2018-06-09 RX ADMIN — METFORMIN HYDROCHLORIDE 500 MILLIGRAM(S): 850 TABLET ORAL at 17:50

## 2018-06-09 RX ADMIN — Medication 1: at 08:52

## 2018-06-09 RX ADMIN — Medication 0.5 MILLIGRAM(S): at 08:57

## 2018-06-09 RX ADMIN — LEVOCARNITINE 660 MILLIGRAM(S): 330 TABLET ORAL at 08:57

## 2018-06-09 RX ADMIN — METFORMIN HYDROCHLORIDE 500 MILLIGRAM(S): 850 TABLET ORAL at 08:57

## 2018-06-09 RX ADMIN — Medication 81 MILLIGRAM(S): at 08:57

## 2018-06-09 RX ADMIN — Medication 100 MILLIGRAM(S): at 13:26

## 2018-06-09 RX ADMIN — Medication 100 MILLIGRAM(S): at 08:57

## 2018-06-09 RX ADMIN — Medication 1 DROP(S): at 20:52

## 2018-06-09 RX ADMIN — Medication 75 MICROGRAM(S): at 08:57

## 2018-06-09 RX ADMIN — SENNA PLUS 1 TABLET(S): 8.6 TABLET ORAL at 20:52

## 2018-06-09 RX ADMIN — LEVOCARNITINE 660 MILLIGRAM(S): 330 TABLET ORAL at 17:50

## 2018-06-10 LAB
GLUCOSE BLDC GLUCOMTR-MCNC: 106 MG/DL — HIGH (ref 70–99)
GLUCOSE BLDC GLUCOMTR-MCNC: 118 MG/DL — HIGH (ref 70–99)
GLUCOSE BLDC GLUCOMTR-MCNC: 175 MG/DL — HIGH (ref 70–99)
GLUCOSE BLDC GLUCOMTR-MCNC: 192 MG/DL — HIGH (ref 70–99)

## 2018-06-10 RX ADMIN — HALOPERIDOL DECANOATE 7 MILLIGRAM(S): 100 INJECTION INTRAMUSCULAR at 06:11

## 2018-06-10 RX ADMIN — DIVALPROEX SODIUM 750 MILLIGRAM(S): 500 TABLET, DELAYED RELEASE ORAL at 20:57

## 2018-06-10 RX ADMIN — Medication 100 MILLIGRAM(S): at 09:03

## 2018-06-10 RX ADMIN — SENNA PLUS 1 TABLET(S): 8.6 TABLET ORAL at 09:04

## 2018-06-10 RX ADMIN — HALOPERIDOL DECANOATE 7 MILLIGRAM(S): 100 INJECTION INTRAMUSCULAR at 20:57

## 2018-06-10 RX ADMIN — Medication 25 MILLIGRAM(S): at 09:04

## 2018-06-10 RX ADMIN — LEVOCARNITINE 660 MILLIGRAM(S): 330 TABLET ORAL at 13:02

## 2018-06-10 RX ADMIN — METFORMIN HYDROCHLORIDE 500 MILLIGRAM(S): 850 TABLET ORAL at 09:04

## 2018-06-10 RX ADMIN — Medication 1 DROP(S): at 20:57

## 2018-06-10 RX ADMIN — Medication 75 MICROGRAM(S): at 09:04

## 2018-06-10 RX ADMIN — LISINOPRIL 10 MILLIGRAM(S): 2.5 TABLET ORAL at 09:04

## 2018-06-10 RX ADMIN — Medication 1: at 17:24

## 2018-06-10 RX ADMIN — LEVOCARNITINE 660 MILLIGRAM(S): 330 TABLET ORAL at 09:04

## 2018-06-10 RX ADMIN — METFORMIN HYDROCHLORIDE 500 MILLIGRAM(S): 850 TABLET ORAL at 17:24

## 2018-06-10 RX ADMIN — Medication 1 DROP(S): at 09:03

## 2018-06-10 RX ADMIN — SENNA PLUS 1 TABLET(S): 8.6 TABLET ORAL at 20:57

## 2018-06-10 RX ADMIN — LACTULOSE 10 GRAM(S): 10 SOLUTION ORAL at 20:57

## 2018-06-10 RX ADMIN — LEVOCARNITINE 660 MILLIGRAM(S): 330 TABLET ORAL at 17:24

## 2018-06-10 RX ADMIN — Medication 0.5 MILLIGRAM(S): at 09:03

## 2018-06-10 RX ADMIN — HALOPERIDOL DECANOATE 7 MILLIGRAM(S): 100 INJECTION INTRAMUSCULAR at 13:02

## 2018-06-10 RX ADMIN — Medication 25 MILLIGRAM(S): at 20:57

## 2018-06-10 RX ADMIN — Medication 100 MILLIGRAM(S): at 20:57

## 2018-06-10 RX ADMIN — Medication 81 MILLIGRAM(S): at 09:03

## 2018-06-10 RX ADMIN — Medication 0.5 MILLIGRAM(S): at 20:57

## 2018-06-10 RX ADMIN — Medication 100 MILLIGRAM(S): at 13:02

## 2018-06-11 LAB
GLUCOSE BLDC GLUCOMTR-MCNC: 139 MG/DL — HIGH (ref 70–99)
GLUCOSE BLDC GLUCOMTR-MCNC: 142 MG/DL — HIGH (ref 70–99)
GLUCOSE BLDC GLUCOMTR-MCNC: 142 MG/DL — HIGH (ref 70–99)
GLUCOSE BLDC GLUCOMTR-MCNC: 159 MG/DL — HIGH (ref 70–99)

## 2018-06-11 PROCEDURE — 99232 SBSQ HOSP IP/OBS MODERATE 35: CPT

## 2018-06-11 PROCEDURE — 90832 PSYTX W PT 30 MINUTES: CPT

## 2018-06-11 RX ADMIN — Medication 81 MILLIGRAM(S): at 09:05

## 2018-06-11 RX ADMIN — Medication 1 DROP(S): at 21:54

## 2018-06-11 RX ADMIN — LACTULOSE 10 GRAM(S): 10 SOLUTION ORAL at 20:43

## 2018-06-11 RX ADMIN — HALOPERIDOL DECANOATE 7 MILLIGRAM(S): 100 INJECTION INTRAMUSCULAR at 06:54

## 2018-06-11 RX ADMIN — DIVALPROEX SODIUM 750 MILLIGRAM(S): 500 TABLET, DELAYED RELEASE ORAL at 21:54

## 2018-06-11 RX ADMIN — Medication 25 MILLIGRAM(S): at 09:06

## 2018-06-11 RX ADMIN — METFORMIN HYDROCHLORIDE 500 MILLIGRAM(S): 850 TABLET ORAL at 17:34

## 2018-06-11 RX ADMIN — HALOPERIDOL DECANOATE 7 MILLIGRAM(S): 100 INJECTION INTRAMUSCULAR at 20:43

## 2018-06-11 RX ADMIN — Medication 0.5 MILLIGRAM(S): at 09:05

## 2018-06-11 RX ADMIN — LISINOPRIL 10 MILLIGRAM(S): 2.5 TABLET ORAL at 09:06

## 2018-06-11 RX ADMIN — Medication 1: at 17:33

## 2018-06-11 RX ADMIN — METFORMIN HYDROCHLORIDE 500 MILLIGRAM(S): 850 TABLET ORAL at 09:06

## 2018-06-11 RX ADMIN — Medication 100 MILLIGRAM(S): at 09:05

## 2018-06-11 RX ADMIN — Medication 1 DROP(S): at 09:05

## 2018-06-11 RX ADMIN — Medication 0.5 MILLIGRAM(S): at 21:54

## 2018-06-11 RX ADMIN — SENNA PLUS 1 TABLET(S): 8.6 TABLET ORAL at 20:43

## 2018-06-11 RX ADMIN — Medication 25 MILLIGRAM(S): at 20:42

## 2018-06-11 RX ADMIN — SENNA PLUS 1 TABLET(S): 8.6 TABLET ORAL at 09:06

## 2018-06-11 RX ADMIN — LEVOCARNITINE 660 MILLIGRAM(S): 330 TABLET ORAL at 12:18

## 2018-06-11 RX ADMIN — LEVOCARNITINE 660 MILLIGRAM(S): 330 TABLET ORAL at 09:06

## 2018-06-11 RX ADMIN — Medication 75 MICROGRAM(S): at 09:06

## 2018-06-11 RX ADMIN — Medication 100 MILLIGRAM(S): at 21:54

## 2018-06-11 RX ADMIN — Medication 100 MILLIGRAM(S): at 12:06

## 2018-06-11 RX ADMIN — LEVOCARNITINE 660 MILLIGRAM(S): 330 TABLET ORAL at 17:34

## 2018-06-11 RX ADMIN — HALOPERIDOL DECANOATE 7 MILLIGRAM(S): 100 INJECTION INTRAMUSCULAR at 12:06

## 2018-06-12 LAB
GLUCOSE BLDC GLUCOMTR-MCNC: 162 MG/DL — HIGH (ref 70–99)
GLUCOSE BLDC GLUCOMTR-MCNC: 168 MG/DL — HIGH (ref 70–99)
GLUCOSE BLDC GLUCOMTR-MCNC: 190 MG/DL — HIGH (ref 70–99)
GLUCOSE BLDC GLUCOMTR-MCNC: 212 MG/DL — HIGH (ref 70–99)

## 2018-06-12 PROCEDURE — 99232 SBSQ HOSP IP/OBS MODERATE 35: CPT

## 2018-06-12 RX ADMIN — LISINOPRIL 10 MILLIGRAM(S): 2.5 TABLET ORAL at 08:54

## 2018-06-12 RX ADMIN — HALOPERIDOL DECANOATE 7 MILLIGRAM(S): 100 INJECTION INTRAMUSCULAR at 06:47

## 2018-06-12 RX ADMIN — Medication 1 DROP(S): at 20:44

## 2018-06-12 RX ADMIN — DIVALPROEX SODIUM 750 MILLIGRAM(S): 500 TABLET, DELAYED RELEASE ORAL at 20:42

## 2018-06-12 RX ADMIN — Medication 100 MILLIGRAM(S): at 20:42

## 2018-06-12 RX ADMIN — Medication 1: at 08:54

## 2018-06-12 RX ADMIN — Medication 2: at 12:38

## 2018-06-12 RX ADMIN — SENNA PLUS 1 TABLET(S): 8.6 TABLET ORAL at 20:42

## 2018-06-12 RX ADMIN — Medication 0.5 MILLIGRAM(S): at 20:42

## 2018-06-12 RX ADMIN — Medication 25 MILLIGRAM(S): at 20:42

## 2018-06-12 RX ADMIN — Medication 1 DROP(S): at 12:38

## 2018-06-12 RX ADMIN — Medication 25 MILLIGRAM(S): at 08:54

## 2018-06-12 RX ADMIN — METFORMIN HYDROCHLORIDE 500 MILLIGRAM(S): 850 TABLET ORAL at 08:54

## 2018-06-12 RX ADMIN — Medication 75 MICROGRAM(S): at 08:54

## 2018-06-12 RX ADMIN — Medication 1: at 17:09

## 2018-06-12 RX ADMIN — HALOPERIDOL DECANOATE 7 MILLIGRAM(S): 100 INJECTION INTRAMUSCULAR at 20:42

## 2018-06-12 RX ADMIN — Medication 81 MILLIGRAM(S): at 08:55

## 2018-06-12 RX ADMIN — SENNA PLUS 1 TABLET(S): 8.6 TABLET ORAL at 08:54

## 2018-06-12 RX ADMIN — LEVOCARNITINE 660 MILLIGRAM(S): 330 TABLET ORAL at 17:09

## 2018-06-12 RX ADMIN — Medication 0.5 MILLIGRAM(S): at 08:55

## 2018-06-12 RX ADMIN — Medication 100 MILLIGRAM(S): at 12:38

## 2018-06-12 RX ADMIN — Medication 100 MILLIGRAM(S): at 08:55

## 2018-06-12 RX ADMIN — LEVOCARNITINE 660 MILLIGRAM(S): 330 TABLET ORAL at 08:54

## 2018-06-12 RX ADMIN — LEVOCARNITINE 660 MILLIGRAM(S): 330 TABLET ORAL at 12:38

## 2018-06-12 RX ADMIN — METFORMIN HYDROCHLORIDE 500 MILLIGRAM(S): 850 TABLET ORAL at 17:10

## 2018-06-12 RX ADMIN — HALOPERIDOL DECANOATE 7 MILLIGRAM(S): 100 INJECTION INTRAMUSCULAR at 12:38

## 2018-06-12 RX ADMIN — LACTULOSE 10 GRAM(S): 10 SOLUTION ORAL at 20:41

## 2018-06-13 LAB
GLUCOSE BLDC GLUCOMTR-MCNC: 135 MG/DL — HIGH (ref 70–99)
GLUCOSE BLDC GLUCOMTR-MCNC: 144 MG/DL — HIGH (ref 70–99)
GLUCOSE BLDC GLUCOMTR-MCNC: 146 MG/DL — HIGH (ref 70–99)
GLUCOSE BLDC GLUCOMTR-MCNC: 158 MG/DL — HIGH (ref 70–99)

## 2018-06-13 PROCEDURE — 99232 SBSQ HOSP IP/OBS MODERATE 35: CPT

## 2018-06-13 RX ORDER — ALUMINUM ACETATE
1 POWDER (GRAM) MISCELLANEOUS
Qty: 0 | Refills: 0 | Status: DISCONTINUED | OUTPATIENT
Start: 2018-06-13 | End: 2018-06-13

## 2018-06-13 RX ORDER — SOD,AMMONIUM,POTASSIUM LACTATE
1 CREAM (GRAM) TOPICAL
Qty: 0 | Refills: 0 | Status: DISCONTINUED | OUTPATIENT
Start: 2018-06-13 | End: 2018-06-15

## 2018-06-13 RX ADMIN — Medication 0.5 MILLIGRAM(S): at 08:56

## 2018-06-13 RX ADMIN — Medication 25 MILLIGRAM(S): at 20:54

## 2018-06-13 RX ADMIN — LEVOCARNITINE 660 MILLIGRAM(S): 330 TABLET ORAL at 17:23

## 2018-06-13 RX ADMIN — METFORMIN HYDROCHLORIDE 500 MILLIGRAM(S): 850 TABLET ORAL at 17:23

## 2018-06-13 RX ADMIN — HALOPERIDOL DECANOATE 7 MILLIGRAM(S): 100 INJECTION INTRAMUSCULAR at 12:45

## 2018-06-13 RX ADMIN — Medication 1 DROP(S): at 20:59

## 2018-06-13 RX ADMIN — HALOPERIDOL DECANOATE 7 MILLIGRAM(S): 100 INJECTION INTRAMUSCULAR at 20:54

## 2018-06-13 RX ADMIN — Medication 75 MICROGRAM(S): at 08:56

## 2018-06-13 RX ADMIN — Medication 1 APPLICATION(S): at 20:59

## 2018-06-13 RX ADMIN — Medication 81 MILLIGRAM(S): at 08:56

## 2018-06-13 RX ADMIN — SENNA PLUS 1 TABLET(S): 8.6 TABLET ORAL at 20:54

## 2018-06-13 RX ADMIN — LEVOCARNITINE 660 MILLIGRAM(S): 330 TABLET ORAL at 08:56

## 2018-06-13 RX ADMIN — SENNA PLUS 1 TABLET(S): 8.6 TABLET ORAL at 08:56

## 2018-06-13 RX ADMIN — Medication 100 MILLIGRAM(S): at 08:56

## 2018-06-13 RX ADMIN — DIVALPROEX SODIUM 750 MILLIGRAM(S): 500 TABLET, DELAYED RELEASE ORAL at 20:54

## 2018-06-13 RX ADMIN — LACTULOSE 10 GRAM(S): 10 SOLUTION ORAL at 20:52

## 2018-06-13 RX ADMIN — LEVOCARNITINE 660 MILLIGRAM(S): 330 TABLET ORAL at 12:45

## 2018-06-13 RX ADMIN — Medication 100 MILLIGRAM(S): at 20:54

## 2018-06-13 RX ADMIN — Medication 0.5 MILLIGRAM(S): at 20:54

## 2018-06-13 RX ADMIN — Medication 100 MILLIGRAM(S): at 12:45

## 2018-06-13 RX ADMIN — HALOPERIDOL DECANOATE 7 MILLIGRAM(S): 100 INJECTION INTRAMUSCULAR at 05:24

## 2018-06-13 RX ADMIN — LISINOPRIL 10 MILLIGRAM(S): 2.5 TABLET ORAL at 08:56

## 2018-06-13 RX ADMIN — METFORMIN HYDROCHLORIDE 500 MILLIGRAM(S): 850 TABLET ORAL at 08:56

## 2018-06-13 RX ADMIN — Medication 25 MILLIGRAM(S): at 08:56

## 2018-06-13 RX ADMIN — Medication 1 DROP(S): at 08:56

## 2018-06-14 LAB
GLUCOSE BLDC GLUCOMTR-MCNC: 130 MG/DL — HIGH (ref 70–99)
GLUCOSE BLDC GLUCOMTR-MCNC: 141 MG/DL — HIGH (ref 70–99)
GLUCOSE BLDC GLUCOMTR-MCNC: 145 MG/DL — HIGH (ref 70–99)
GLUCOSE BLDC GLUCOMTR-MCNC: 164 MG/DL — HIGH (ref 70–99)

## 2018-06-14 PROCEDURE — 99232 SBSQ HOSP IP/OBS MODERATE 35: CPT

## 2018-06-14 RX ADMIN — Medication 0.5 MILLIGRAM(S): at 09:04

## 2018-06-14 RX ADMIN — METFORMIN HYDROCHLORIDE 500 MILLIGRAM(S): 850 TABLET ORAL at 09:05

## 2018-06-14 RX ADMIN — LEVOCARNITINE 660 MILLIGRAM(S): 330 TABLET ORAL at 09:05

## 2018-06-14 RX ADMIN — Medication 75 MICROGRAM(S): at 09:05

## 2018-06-14 RX ADMIN — SENNA PLUS 1 TABLET(S): 8.6 TABLET ORAL at 09:05

## 2018-06-14 RX ADMIN — Medication 25 MILLIGRAM(S): at 09:05

## 2018-06-14 RX ADMIN — LEVOCARNITINE 660 MILLIGRAM(S): 330 TABLET ORAL at 17:20

## 2018-06-14 RX ADMIN — Medication 100 MILLIGRAM(S): at 13:12

## 2018-06-14 RX ADMIN — LISINOPRIL 10 MILLIGRAM(S): 2.5 TABLET ORAL at 09:05

## 2018-06-14 RX ADMIN — LACTULOSE 10 GRAM(S): 10 SOLUTION ORAL at 21:06

## 2018-06-14 RX ADMIN — SENNA PLUS 1 TABLET(S): 8.6 TABLET ORAL at 21:06

## 2018-06-14 RX ADMIN — HALOPERIDOL DECANOATE 7 MILLIGRAM(S): 100 INJECTION INTRAMUSCULAR at 06:43

## 2018-06-14 RX ADMIN — Medication 1: at 17:32

## 2018-06-14 RX ADMIN — Medication 81 MILLIGRAM(S): at 09:04

## 2018-06-14 RX ADMIN — Medication 1 DROP(S): at 21:06

## 2018-06-14 RX ADMIN — Medication 100 MILLIGRAM(S): at 09:05

## 2018-06-14 RX ADMIN — HALOPERIDOL DECANOATE 7 MILLIGRAM(S): 100 INJECTION INTRAMUSCULAR at 13:12

## 2018-06-14 RX ADMIN — Medication 0.5 MILLIGRAM(S): at 21:06

## 2018-06-14 RX ADMIN — DIVALPROEX SODIUM 750 MILLIGRAM(S): 500 TABLET, DELAYED RELEASE ORAL at 21:06

## 2018-06-14 RX ADMIN — METFORMIN HYDROCHLORIDE 500 MILLIGRAM(S): 850 TABLET ORAL at 17:20

## 2018-06-14 RX ADMIN — Medication 100 MILLIGRAM(S): at 21:06

## 2018-06-14 RX ADMIN — LEVOCARNITINE 660 MILLIGRAM(S): 330 TABLET ORAL at 13:12

## 2018-06-14 RX ADMIN — Medication 1 APPLICATION(S): at 21:06

## 2018-06-14 RX ADMIN — Medication 25 MILLIGRAM(S): at 21:06

## 2018-06-14 RX ADMIN — Medication 1 DROP(S): at 09:04

## 2018-06-14 RX ADMIN — Medication 1 APPLICATION(S): at 09:04

## 2018-06-14 RX ADMIN — HALOPERIDOL DECANOATE 7 MILLIGRAM(S): 100 INJECTION INTRAMUSCULAR at 21:06

## 2018-06-15 VITALS — RESPIRATION RATE: 18 BRPM | TEMPERATURE: 99 F

## 2018-06-15 LAB
GLUCOSE BLDC GLUCOMTR-MCNC: 113 MG/DL — HIGH (ref 70–99)
GLUCOSE BLDC GLUCOMTR-MCNC: 126 MG/DL — HIGH (ref 70–99)

## 2018-06-15 PROCEDURE — 99238 HOSP IP/OBS DSCHRG MGMT 30/<: CPT

## 2018-06-15 RX ORDER — ASPIRIN/CALCIUM CARB/MAGNESIUM 324 MG
1 TABLET ORAL
Qty: 0 | Refills: 0 | DISCHARGE
Start: 2018-06-15

## 2018-06-15 RX ORDER — DIVALPROEX SODIUM 500 MG/1
6 TABLET, DELAYED RELEASE ORAL
Qty: 0 | Refills: 0 | DISCHARGE
Start: 2018-06-15

## 2018-06-15 RX ORDER — SENNA PLUS 8.6 MG/1
1 TABLET ORAL
Qty: 0 | Refills: 0 | DISCHARGE
Start: 2018-06-15

## 2018-06-15 RX ORDER — LISINOPRIL 2.5 MG/1
1 TABLET ORAL
Qty: 0 | Refills: 0 | DISCHARGE
Start: 2018-06-15

## 2018-06-15 RX ORDER — SOD,AMMONIUM,POTASSIUM LACTATE
1 CREAM (GRAM) TOPICAL
Qty: 0 | Refills: 0 | DISCHARGE
Start: 2018-06-15

## 2018-06-15 RX ORDER — LEVOCARNITINE 330 MG/1
2 TABLET ORAL
Qty: 0 | Refills: 0 | DISCHARGE
Start: 2018-06-15

## 2018-06-15 RX ORDER — LACTULOSE 10 G/15ML
15 SOLUTION ORAL
Qty: 0 | Refills: 0 | DISCHARGE
Start: 2018-06-15

## 2018-06-15 RX ORDER — METFORMIN HYDROCHLORIDE 850 MG/1
1 TABLET ORAL
Qty: 0 | Refills: 0 | DISCHARGE
Start: 2018-06-15

## 2018-06-15 RX ORDER — HALOPERIDOL DECANOATE 100 MG/ML
7 INJECTION INTRAMUSCULAR
Qty: 0 | Refills: 0 | DISCHARGE
Start: 2018-06-15

## 2018-06-15 RX ORDER — DOCUSATE SODIUM 100 MG
1 CAPSULE ORAL
Qty: 0 | Refills: 0 | DISCHARGE
Start: 2018-06-15

## 2018-06-15 RX ORDER — METOPROLOL TARTRATE 50 MG
1 TABLET ORAL
Qty: 0 | Refills: 0 | DISCHARGE
Start: 2018-06-15

## 2018-06-15 RX ORDER — LEVOTHYROXINE SODIUM 125 MCG
1 TABLET ORAL
Qty: 0 | Refills: 0 | DISCHARGE
Start: 2018-06-15

## 2018-06-15 RX ORDER — BENZTROPINE MESYLATE 1 MG
1 TABLET ORAL
Qty: 0 | Refills: 0 | DISCHARGE
Start: 2018-06-15

## 2018-06-15 RX ADMIN — METFORMIN HYDROCHLORIDE 500 MILLIGRAM(S): 850 TABLET ORAL at 09:40

## 2018-06-15 RX ADMIN — Medication 1 DROP(S): at 09:40

## 2018-06-15 RX ADMIN — Medication 1 APPLICATION(S): at 09:40

## 2018-06-15 RX ADMIN — HALOPERIDOL DECANOATE 7 MILLIGRAM(S): 100 INJECTION INTRAMUSCULAR at 06:23

## 2018-06-15 RX ADMIN — SENNA PLUS 1 TABLET(S): 8.6 TABLET ORAL at 09:40

## 2018-06-15 RX ADMIN — HALOPERIDOL DECANOATE 7 MILLIGRAM(S): 100 INJECTION INTRAMUSCULAR at 12:51

## 2018-06-15 RX ADMIN — Medication 75 MICROGRAM(S): at 09:40

## 2018-06-15 RX ADMIN — LEVOCARNITINE 660 MILLIGRAM(S): 330 TABLET ORAL at 12:51

## 2018-06-15 RX ADMIN — Medication 81 MILLIGRAM(S): at 09:40

## 2018-06-15 RX ADMIN — Medication 25 MILLIGRAM(S): at 09:40

## 2018-06-15 RX ADMIN — LEVOCARNITINE 660 MILLIGRAM(S): 330 TABLET ORAL at 09:24

## 2018-06-15 RX ADMIN — Medication 0.5 MILLIGRAM(S): at 09:40

## 2018-06-15 RX ADMIN — Medication 100 MILLIGRAM(S): at 12:51

## 2018-06-15 RX ADMIN — Medication 100 MILLIGRAM(S): at 09:40

## 2018-06-15 RX ADMIN — LISINOPRIL 10 MILLIGRAM(S): 2.5 TABLET ORAL at 09:40

## 2019-08-26 PROBLEM — E11.9 TYPE 2 DIABETES MELLITUS WITHOUT COMPLICATIONS: Chronic | Status: ACTIVE | Noted: 2017-10-28

## 2019-08-26 PROBLEM — F20.9 SCHIZOPHRENIA, UNSPECIFIED: Chronic | Status: ACTIVE | Noted: 2017-10-28

## 2019-08-26 PROBLEM — G40.909 EPILEPSY, UNSPECIFIED, NOT INTRACTABLE, WITHOUT STATUS EPILEPTICUS: Chronic | Status: ACTIVE | Noted: 2017-10-28

## 2019-08-26 PROBLEM — G20 PARKINSON'S DISEASE: Chronic | Status: ACTIVE | Noted: 2017-10-28

## 2019-08-26 PROBLEM — E03.9 HYPOTHYROIDISM, UNSPECIFIED: Chronic | Status: ACTIVE | Noted: 2017-10-28

## 2019-08-26 PROBLEM — K64.9 UNSPECIFIED HEMORRHOIDS: Chronic | Status: ACTIVE | Noted: 2017-10-28

## 2019-08-26 PROBLEM — E78.5 HYPERLIPIDEMIA, UNSPECIFIED: Chronic | Status: ACTIVE | Noted: 2017-10-28

## 2019-09-24 ENCOUNTER — APPOINTMENT (OUTPATIENT)
Dept: OBGYN | Facility: HOSPITAL | Age: 69
End: 2019-09-24

## 2021-10-01 NOTE — ED ADULT TRIAGE NOTE - CHIEF COMPLAINT QUOTE
pt brought in by ambulance from [facility]. pt was recently admitted to [facility] for aggression and uncontrollable erratic behavior. pt sent to ED today for continuous erratic behavior and paranoid ideations.   hx-schizophrenia, depressive d/o seen by Pierce MAGANA  Patient will need outpatient MRI to evaluate complaints  Will trial muscle relaxant here in ER to reassess for improvement  Will give info for [facility] and [facility] - to make soonest avialabe appt Pain improved s/p cyclobenzaprine.  Will D/C with specialist referral and 10day supply of cyclobenzaprine, can also use motrin.  strict D/C precautions.  Reviewed with MD Pelayo who agrees.  Patient is stable, in no apparent distress, non-toxic appearing, tolerating PO, no neurologic deficits, and is cleared for discharge to home.

## 2021-10-10 ENCOUNTER — EMERGENCY (EMERGENCY)
Facility: HOSPITAL | Age: 71
LOS: 1 days | Discharge: HOME | End: 2021-10-10
Attending: EMERGENCY MEDICINE | Admitting: EMERGENCY MEDICINE
Payer: MEDICARE

## 2021-10-10 VITALS
DIASTOLIC BLOOD PRESSURE: 70 MMHG | RESPIRATION RATE: 16 BRPM | OXYGEN SATURATION: 100 % | SYSTOLIC BLOOD PRESSURE: 122 MMHG | HEART RATE: 84 BPM | HEIGHT: 62 IN | TEMPERATURE: 98 F

## 2021-10-10 LAB
ALBUMIN SERPL ELPH-MCNC: 4.3 G/DL — SIGNIFICANT CHANGE UP (ref 3.3–5)
ALP SERPL-CCNC: 84 U/L — SIGNIFICANT CHANGE UP (ref 40–120)
ALT FLD-CCNC: 11 U/L — SIGNIFICANT CHANGE UP (ref 4–33)
ANION GAP SERPL CALC-SCNC: 15 MMOL/L — HIGH (ref 7–14)
APPEARANCE UR: CLEAR — SIGNIFICANT CHANGE UP
APTT BLD: 39.4 SEC — HIGH (ref 27–36.3)
AST SERPL-CCNC: 17 U/L — SIGNIFICANT CHANGE UP (ref 4–32)
B PERT DNA SPEC QL NAA+PROBE: SIGNIFICANT CHANGE UP
B PERT+PARAPERT DNA PNL SPEC NAA+PROBE: SIGNIFICANT CHANGE UP
BACTERIA # UR AUTO: NEGATIVE — SIGNIFICANT CHANGE UP
BASOPHILS # BLD AUTO: 0.03 K/UL — SIGNIFICANT CHANGE UP (ref 0–0.2)
BASOPHILS NFR BLD AUTO: 0.5 % — SIGNIFICANT CHANGE UP (ref 0–2)
BILIRUB SERPL-MCNC: 0.2 MG/DL — SIGNIFICANT CHANGE UP (ref 0.2–1.2)
BILIRUB UR-MCNC: NEGATIVE — SIGNIFICANT CHANGE UP
BORDETELLA PARAPERTUSSIS (RAPRVP): SIGNIFICANT CHANGE UP
BUN SERPL-MCNC: 21 MG/DL — SIGNIFICANT CHANGE UP (ref 7–23)
C PNEUM DNA SPEC QL NAA+PROBE: SIGNIFICANT CHANGE UP
CALCIUM SERPL-MCNC: 9.7 MG/DL — SIGNIFICANT CHANGE UP (ref 8.4–10.5)
CHLORIDE SERPL-SCNC: 104 MMOL/L — SIGNIFICANT CHANGE UP (ref 98–107)
CK SERPL-CCNC: 193 U/L — HIGH (ref 25–170)
CO2 SERPL-SCNC: 25 MMOL/L — SIGNIFICANT CHANGE UP (ref 22–31)
COLOR SPEC: SIGNIFICANT CHANGE UP
CREAT SERPL-MCNC: 1.01 MG/DL — SIGNIFICANT CHANGE UP (ref 0.5–1.3)
DIFF PNL FLD: NEGATIVE — SIGNIFICANT CHANGE UP
EOSINOPHIL # BLD AUTO: 0.08 K/UL — SIGNIFICANT CHANGE UP (ref 0–0.5)
EOSINOPHIL NFR BLD AUTO: 1.4 % — SIGNIFICANT CHANGE UP (ref 0–6)
FLUAV SUBTYP SPEC NAA+PROBE: SIGNIFICANT CHANGE UP
FLUBV RNA SPEC QL NAA+PROBE: SIGNIFICANT CHANGE UP
GAS PNL BLDV: SIGNIFICANT CHANGE UP
GLUCOSE SERPL-MCNC: 64 MG/DL — LOW (ref 70–99)
GLUCOSE UR QL: ABNORMAL
HADV DNA SPEC QL NAA+PROBE: SIGNIFICANT CHANGE UP
HCOV 229E RNA SPEC QL NAA+PROBE: SIGNIFICANT CHANGE UP
HCOV HKU1 RNA SPEC QL NAA+PROBE: SIGNIFICANT CHANGE UP
HCOV NL63 RNA SPEC QL NAA+PROBE: SIGNIFICANT CHANGE UP
HCOV OC43 RNA SPEC QL NAA+PROBE: SIGNIFICANT CHANGE UP
HCT VFR BLD CALC: 34.9 % — SIGNIFICANT CHANGE UP (ref 34.5–45)
HGB BLD-MCNC: 11.1 G/DL — LOW (ref 11.5–15.5)
HMPV RNA SPEC QL NAA+PROBE: SIGNIFICANT CHANGE UP
HPIV1 RNA SPEC QL NAA+PROBE: SIGNIFICANT CHANGE UP
HPIV2 RNA SPEC QL NAA+PROBE: SIGNIFICANT CHANGE UP
HPIV3 RNA SPEC QL NAA+PROBE: SIGNIFICANT CHANGE UP
HPIV4 RNA SPEC QL NAA+PROBE: SIGNIFICANT CHANGE UP
IANC: 3.68 K/UL — SIGNIFICANT CHANGE UP (ref 1.5–8.5)
IMM GRANULOCYTES NFR BLD AUTO: 0.2 % — SIGNIFICANT CHANGE UP (ref 0–1.5)
INR BLD: 1.39 RATIO — HIGH (ref 0.88–1.16)
KETONES UR-MCNC: ABNORMAL
LEUKOCYTE ESTERASE UR-ACNC: NEGATIVE — SIGNIFICANT CHANGE UP
LYMPHOCYTES # BLD AUTO: 1.26 K/UL — SIGNIFICANT CHANGE UP (ref 1–3.3)
LYMPHOCYTES # BLD AUTO: 22.1 % — SIGNIFICANT CHANGE UP (ref 13–44)
M PNEUMO DNA SPEC QL NAA+PROBE: SIGNIFICANT CHANGE UP
MCHC RBC-ENTMCNC: 27 PG — SIGNIFICANT CHANGE UP (ref 27–34)
MCHC RBC-ENTMCNC: 31.8 GM/DL — LOW (ref 32–36)
MCV RBC AUTO: 84.9 FL — SIGNIFICANT CHANGE UP (ref 80–100)
MONOCYTES # BLD AUTO: 0.63 K/UL — SIGNIFICANT CHANGE UP (ref 0–0.9)
MONOCYTES NFR BLD AUTO: 11.1 % — SIGNIFICANT CHANGE UP (ref 2–14)
NEUTROPHILS # BLD AUTO: 3.68 K/UL — SIGNIFICANT CHANGE UP (ref 1.8–7.4)
NEUTROPHILS NFR BLD AUTO: 64.7 % — SIGNIFICANT CHANGE UP (ref 43–77)
NITRITE UR-MCNC: NEGATIVE — SIGNIFICANT CHANGE UP
NRBC # BLD: 0 /100 WBCS — SIGNIFICANT CHANGE UP
NRBC # FLD: 0 K/UL — SIGNIFICANT CHANGE UP
PH UR: 6.5 — SIGNIFICANT CHANGE UP (ref 5–8)
PLATELET # BLD AUTO: 140 K/UL — LOW (ref 150–400)
POTASSIUM SERPL-MCNC: 3.7 MMOL/L — SIGNIFICANT CHANGE UP (ref 3.5–5.3)
POTASSIUM SERPL-SCNC: 3.7 MMOL/L — SIGNIFICANT CHANGE UP (ref 3.5–5.3)
PROT SERPL-MCNC: 8 G/DL — SIGNIFICANT CHANGE UP (ref 6–8.3)
PROT UR-MCNC: NEGATIVE — SIGNIFICANT CHANGE UP
PROTHROM AB SERPL-ACNC: 15.7 SEC — HIGH (ref 10.6–13.6)
RAPID RVP RESULT: SIGNIFICANT CHANGE UP
RBC # BLD: 4.11 M/UL — SIGNIFICANT CHANGE UP (ref 3.8–5.2)
RBC # FLD: 14.7 % — HIGH (ref 10.3–14.5)
RBC CASTS # UR COMP ASSIST: 2 /HPF — SIGNIFICANT CHANGE UP (ref 0–4)
RSV RNA SPEC QL NAA+PROBE: SIGNIFICANT CHANGE UP
RV+EV RNA SPEC QL NAA+PROBE: SIGNIFICANT CHANGE UP
SARS-COV-2 RNA SPEC QL NAA+PROBE: SIGNIFICANT CHANGE UP
SODIUM SERPL-SCNC: 144 MMOL/L — SIGNIFICANT CHANGE UP (ref 135–145)
SP GR SPEC: 1.03 — SIGNIFICANT CHANGE UP (ref 1–1.05)
TROPONIN T, HIGH SENSITIVITY RESULT: 15 NG/L — SIGNIFICANT CHANGE UP
TSH SERPL-MCNC: 1.43 UIU/ML — SIGNIFICANT CHANGE UP (ref 0.27–4.2)
UROBILINOGEN FLD QL: SIGNIFICANT CHANGE UP
VALPROATE SERPL-MCNC: 43.8 UG/ML — LOW (ref 50–100)
WBC # BLD: 5.69 K/UL — SIGNIFICANT CHANGE UP (ref 3.8–10.5)
WBC # FLD AUTO: 5.69 K/UL — SIGNIFICANT CHANGE UP (ref 3.8–10.5)
WBC UR QL: 2 /HPF — SIGNIFICANT CHANGE UP (ref 0–5)

## 2021-10-10 PROCEDURE — 99285 EMERGENCY DEPT VISIT HI MDM: CPT | Mod: 25

## 2021-10-10 PROCEDURE — 93010 ELECTROCARDIOGRAM REPORT: CPT

## 2021-10-10 PROCEDURE — 71045 X-RAY EXAM CHEST 1 VIEW: CPT | Mod: 26

## 2021-10-10 PROCEDURE — 71260 CT THORAX DX C+: CPT | Mod: 26

## 2021-10-10 PROCEDURE — 70450 CT HEAD/BRAIN W/O DYE: CPT | Mod: 26

## 2021-10-10 PROCEDURE — 74177 CT ABD & PELVIS W/CONTRAST: CPT | Mod: 26

## 2021-10-10 RX ORDER — DEXTROSE 10 % IN WATER 10 %
1000 INTRAVENOUS SOLUTION INTRAVENOUS
Refills: 0 | Status: DISCONTINUED | OUTPATIENT
Start: 2021-10-10 | End: 2021-10-14

## 2021-10-10 RX ORDER — DEXTROSE 50 % IN WATER 50 %
50 SYRINGE (ML) INTRAVENOUS ONCE
Refills: 0 | Status: COMPLETED | OUTPATIENT
Start: 2021-10-10 | End: 2021-10-10

## 2021-10-10 RX ADMIN — Medication 50 MILLILITER(S): at 20:47

## 2021-10-10 RX ADMIN — Medication 1 MILLIGRAM(S): at 21:43

## 2021-10-10 RX ADMIN — Medication 80 MILLILITER(S): at 19:41

## 2021-10-10 NOTE — ED PROVIDER NOTE - PROGRESS NOTE DETAILS
NP jonh [phone]    Dr. Valente, [phone] Marii, PGY3:pt with BG 60s PO'ing juice, will continue to PO with freq fs's Pt signed out to me. No actionable findings on labs or CT. Spoke to NP Amalia from [facility] - [phone]. Will take pt back and have GOC conversation to decide long term plan. Will DC w all results. They are able to do hydration at facility. COREY Talavera PGY3 Dr. Valente, [phone], called and requested update. She provided number to ISREAL Borja. ISREAL borja [phone]

## 2021-10-10 NOTE — ED PROVIDER NOTE - CLINICAL SUMMARY MEDICAL DECISION MAKING FREE TEXT BOX
DDx includes toxic, metabolic, infx, primary neuro, neoplastic, cardiac. Will obtain labs, CT's, ekg/biomarkers, urine. obtain collateral. Reassess.

## 2021-10-10 NOTE — ED PROVIDER NOTE - OBJECTIVE STATEMENT
70F PMH Schizo (haldol/valproate), DM (metformin), Parkinson's, Previous Bladder mass (removed and negative for malignancy per sister), presents to the ED from [facility]. Pt not great historian though is aaox3.  Spoke to sister Natali Aj [phone], states she called the ambulance today because for the past two weeks pt hasn't been making any sense, pt has been losing weight though states she hasn't been eating much, otherwise no fevers/chills, uri sxs, cough, abd pain, n/v/d, dark/bloody stools, urinary sxs, rash. 70F PMH Schizo (haldol/valproate), DM (metformin), Parkinson's, Previous Bladder Cancer, HTH, Hypothyroid, presents to the ED from [facility]. Pt not great historian though is aaox3.  Spoke to sister Natali Aj [phone], states she called the ambulance today because for the past two weeks pt hasn't been making any sense, pt has been losing weight though states she hasn't been eating much, otherwise no fevers/chills, uri sxs, cough, abd pain, n/v/d, dark/bloody stools, urinary sxs, rash.  Spoke with ISREAL Simpson [phone], states she doesn't directly work with pt but is able to give summary of handoff, states pt lost 6 lbs from January 2021 until now (164 to 138), decreased po intake, pt seems altered.    Meds: Haldol, aspirin, valproic acid, benztropine, fluphenazine, gabapentin, levothyroxine, lisinopril, metoprolol, trazodone

## 2021-10-10 NOTE — ED ADULT NURSE NOTE - CHIEF COMPLAINT QUOTE
no
from [facility] reported hx lung ca ,psych hx. staff reported change mental status since this am. pt un cooperative, yelling at triage. fs 66

## 2021-10-10 NOTE — ED PROVIDER NOTE - NSFOLLOWUPINSTRUCTIONS_ED_ALL_ED_FT
Please see attached information on [facility].     Return to the ER for new or worsening mental status, fevers, or any other concerning symptoms.     Follow up with the PCP.

## 2021-10-10 NOTE — ED ADULT TRIAGE NOTE - CHIEF COMPLAINT QUOTE
from [facility] reported hx lung ca ,psych hx. staff reported change mental status since this am. pt un cooperative, yelling at triage. fs 66

## 2021-10-10 NOTE — ED ADULT NURSE REASSESSMENT NOTE - NS ED NURSE REASSESS COMMENT FT1
Pt awake and alert, A&OX4, with periods of confusion to situation. Respirations even and unlabored. Pt agitated earlier, pulling at IV and climbing out of bed. Medicated per order. Resting comfortably at this time. Denies CP, SOB, N/V. Will continue to monitor.

## 2021-10-10 NOTE — ED PROVIDER NOTE - NSICDXPASTMEDICALHX_GEN_ALL_CORE_FT
PAST MEDICAL HISTORY:  Hemorrhoids     Hyperlipidemia     Hypothyroidism     Parkinson disease     Schizophrenia     Seizure disorder     Type II diabetes mellitus

## 2021-10-10 NOTE — ED ADULT NURSE REASSESSMENT NOTE - NS ED NURSE REASSESS COMMENT FT1
Pt awake, oriented x 1 with BGL as charted.   D10 infusion started as ordered.   D50 on hold as per MD team until further BGL after D10 infusing.    Will repeat BGL at 2030.   Pt denies any pain or distress.   Calm and cooperative.  IV site unremarkable.   VSS.   Respirations even and unlabored.   No vomiting or diarrhea.   No acute distress noted.

## 2021-10-10 NOTE — ED PROVIDER NOTE - PATIENT PORTAL LINK FT
You can access the FollowMyHealth Patient Portal offered by [facility] by registering at the following website: [email or web address]. By joining [facility]’s FollowMyHealth portal, you will also be able to view your health information using other applications (apps) compatible with our system.

## 2021-10-10 NOTE — ED ADULT NURSE REASSESSMENT NOTE - NS ED NURSE REASSESS COMMENT FT1
Indwelling urinary catheter placed using sterile technique. Pt tolerated well. Urine output of 1850cc. Labs sent. Indwelling urinary catheter placed using sterile technique. Pt tolerated well. Urine output of 1850cc of clear yellow urine. Labs sent.

## 2021-10-10 NOTE — ED PROVIDER NOTE - PHYSICAL EXAMINATION
GENERAL: no acute distress, non-toxic appearing  HEENT: oral mucosa dry, tarditive dyskinesia present  CARDIAC: regular rate and regular rhythm  PULM: clear to ascultation bilaterally, sats well on RA, no incr wob  GI: abdomen nondistended, soft, nontender  : no suprapubic tenderness  NEURO: alert and oriented x 2-3, moving all extremities without lateralization, tarditive dyskinesia  MSK: no visible deformities, no peripheral edema  SKIN: no visible rashes  PSYCH: pt paranoid, tangential, bizarre thoughts/speech

## 2021-10-10 NOTE — ED ADULT NURSE NOTE - OBJECTIVE STATEMENT
Pt awake, alert and oriented x 1 sent in from nursing home for change in mental status.  No recent fever, fall or head injury.   Pt denies any pain or discomfort.   No n/v/d.   Respirations even and unlabored, VSS.   IV placed and blood work sent, RVP sent.   Pt agitated and violent with straight cath attempt for urine; unable to verbalize her need to urinate.    MD team aware of inability to obtain urine sample.    Pt awaiting head CT.   No acute distress noted.   No skin breakdown noted.

## 2021-10-10 NOTE — ED PROVIDER NOTE - ATTENDING CONTRIBUTION TO CARE
Patient is a 71 yo F with history of schizoaffective d/o, Parkinson's disease, DM, HTN, hypothyroidism, hx of bladder cancer sent in for evaluation of altered mental status. Patient is AAO x3, however per conversation of patient's sister with Dr. Colvin, patient has not been making sense in the past 2 weeks and has been losing weight. Patient denies fevers, chills, nausea, vomiting, chest pain or abdominal pain.    VS noted  Gen. no acute distress, Non toxic, odd affect   HEENT: EOMI, mmm  Lungs: CTAB/L no C/ W /R   CVS: RRR   Abd; Soft non tender, non distended   Ext: no edema  Skin: no rash  Neuro AAOx3 non focal clear speech  a/p: change in behavior, weight loss - plan for labs, CT imaging as patient's NP and physician worried about recurrent malignancy.   - Bria FLOR

## 2021-10-11 VITALS
TEMPERATURE: 98 F | RESPIRATION RATE: 18 BRPM | HEART RATE: 75 BPM | OXYGEN SATURATION: 100 % | DIASTOLIC BLOOD PRESSURE: 80 MMHG | SYSTOLIC BLOOD PRESSURE: 136 MMHG

## 2021-10-11 LAB
CULTURE RESULTS: NO GROWTH — SIGNIFICANT CHANGE UP
SPECIMEN SOURCE: SIGNIFICANT CHANGE UP

## 2021-10-11 NOTE — ED ADULT NURSE REASSESSMENT NOTE - NS ED NURSE REASSESS COMMENT FT1
Pt p/u by EMS at this time for transport to NH. A&O4, calm and cooperative, respirations even and unlabored. IV dcED. Larkin draining well. Denies CP, SOB, N/V, no acute distress.

## 2021-10-11 NOTE — PROVIDER CONTACT NOTE (OTHER) - ASSESSMENT
As per Dr. Steinberg, pt cleared to return to [facility]. MD spoke with SNF. Ambulance transport to be arranged on pt’s behalf. Writer contacted [facility] #[phone] and spoke with Pietro who attempted to arrange transportation with [facility]. Pietro later returned to call and reported that EMS dispatcher reporting we would have to call them directly as [employer] is pt’s primary insurance. [facility] called at [phone]. Writer spoke with Elizabeth who scheduled trip for  at 3:30am with trip #90A. Writer contacted [facility] on call #[phone] to inform of transportation arrangements. Writer spoke with ISREAL Amaya who is aware of pt’s return. Verbal huddle occurred with team.

## 2021-10-11 NOTE — ED POST DISCHARGE NOTE - ADDITIONAL DOCUMENTATION
Tonya, PGY-3 11:43am: Patient's NP called from facility. Pt information, , MRN confirmed. They request verbal confirmation of CT results and reason for olvera placement--per RN noted 1.8 L urinary retention noted. Tonya, PGY-3 11:43am: Patient's NP called from facility. They state no signoff / report was given to facility, and they do not know what was done for patient/any pertinent findings. Pt information, , MRN confirmed. They request verbal confirmation of CT results and reason for olvera placement--per RN noted 1.8 L urinary retention noted.

## 2021-10-13 ENCOUNTER — INPATIENT (INPATIENT)
Facility: HOSPITAL | Age: 71
LOS: 37 days | Discharge: OTHER INSTITUTION | End: 2021-11-20
Attending: PSYCHIATRY & NEUROLOGY | Admitting: PSYCHIATRY & NEUROLOGY
Payer: MEDICARE

## 2021-10-13 VITALS
RESPIRATION RATE: 18 BRPM | HEART RATE: 95 BPM | SYSTOLIC BLOOD PRESSURE: 129 MMHG | OXYGEN SATURATION: 100 % | DIASTOLIC BLOOD PRESSURE: 65 MMHG | TEMPERATURE: 98 F | HEIGHT: 62 IN

## 2021-10-13 DIAGNOSIS — R44.3 HALLUCINATIONS, UNSPECIFIED: ICD-10-CM

## 2021-10-13 LAB
ALBUMIN SERPL ELPH-MCNC: 3.4 G/DL — SIGNIFICANT CHANGE UP (ref 3.3–5)
ALP SERPL-CCNC: 75 U/L — SIGNIFICANT CHANGE UP (ref 40–120)
ALT FLD-CCNC: 14 U/L — SIGNIFICANT CHANGE UP (ref 4–33)
ANION GAP SERPL CALC-SCNC: 11 MMOL/L — SIGNIFICANT CHANGE UP (ref 7–14)
APAP SERPL-MCNC: <15 UG/ML — SIGNIFICANT CHANGE UP (ref 15–25)
APPEARANCE UR: CLEAR — SIGNIFICANT CHANGE UP
AST SERPL-CCNC: 23 U/L — SIGNIFICANT CHANGE UP (ref 4–32)
BASOPHILS # BLD AUTO: 0.02 K/UL — SIGNIFICANT CHANGE UP (ref 0–0.2)
BASOPHILS NFR BLD AUTO: 0.5 % — SIGNIFICANT CHANGE UP (ref 0–2)
BILIRUB SERPL-MCNC: <0.2 MG/DL — SIGNIFICANT CHANGE UP (ref 0.2–1.2)
BILIRUB UR-MCNC: NEGATIVE — SIGNIFICANT CHANGE UP
BUN SERPL-MCNC: 20 MG/DL — SIGNIFICANT CHANGE UP (ref 7–23)
CALCIUM SERPL-MCNC: 9.3 MG/DL — SIGNIFICANT CHANGE UP (ref 8.4–10.5)
CHLORIDE SERPL-SCNC: 106 MMOL/L — SIGNIFICANT CHANGE UP (ref 98–107)
CO2 SERPL-SCNC: 23 MMOL/L — SIGNIFICANT CHANGE UP (ref 22–31)
COLOR SPEC: SIGNIFICANT CHANGE UP
CREAT SERPL-MCNC: 0.95 MG/DL — SIGNIFICANT CHANGE UP (ref 0.5–1.3)
DIFF PNL FLD: NEGATIVE — SIGNIFICANT CHANGE UP
EOSINOPHIL # BLD AUTO: 0.1 K/UL — SIGNIFICANT CHANGE UP (ref 0–0.5)
EOSINOPHIL NFR BLD AUTO: 2.3 % — SIGNIFICANT CHANGE UP (ref 0–6)
ETHANOL SERPL-MCNC: <10 MG/DL — SIGNIFICANT CHANGE UP
GLUCOSE SERPL-MCNC: 133 MG/DL — HIGH (ref 70–99)
GLUCOSE UR QL: NEGATIVE — SIGNIFICANT CHANGE UP
HCT VFR BLD CALC: 32.8 % — LOW (ref 34.5–45)
HGB BLD-MCNC: 10.6 G/DL — LOW (ref 11.5–15.5)
IANC: 2.81 K/UL — SIGNIFICANT CHANGE UP (ref 1.5–8.5)
IMM GRANULOCYTES NFR BLD AUTO: 0.2 % — SIGNIFICANT CHANGE UP (ref 0–1.5)
KETONES UR-MCNC: NEGATIVE — SIGNIFICANT CHANGE UP
LEUKOCYTE ESTERASE UR-ACNC: NEGATIVE — SIGNIFICANT CHANGE UP
LYMPHOCYTES # BLD AUTO: 0.9 K/UL — LOW (ref 1–3.3)
LYMPHOCYTES # BLD AUTO: 20.7 % — SIGNIFICANT CHANGE UP (ref 13–44)
MCHC RBC-ENTMCNC: 27.3 PG — SIGNIFICANT CHANGE UP (ref 27–34)
MCHC RBC-ENTMCNC: 32.3 GM/DL — SIGNIFICANT CHANGE UP (ref 32–36)
MCV RBC AUTO: 84.5 FL — SIGNIFICANT CHANGE UP (ref 80–100)
MONOCYTES # BLD AUTO: 0.5 K/UL — SIGNIFICANT CHANGE UP (ref 0–0.9)
MONOCYTES NFR BLD AUTO: 11.5 % — SIGNIFICANT CHANGE UP (ref 2–14)
NEUTROPHILS # BLD AUTO: 2.81 K/UL — SIGNIFICANT CHANGE UP (ref 1.8–7.4)
NEUTROPHILS NFR BLD AUTO: 64.8 % — SIGNIFICANT CHANGE UP (ref 43–77)
NITRITE UR-MCNC: POSITIVE
NRBC # BLD: 0 /100 WBCS — SIGNIFICANT CHANGE UP
NRBC # FLD: 0 K/UL — SIGNIFICANT CHANGE UP
PCP SPEC-MCNC: SIGNIFICANT CHANGE UP
PH UR: 6.5 — SIGNIFICANT CHANGE UP (ref 5–8)
PLATELET # BLD AUTO: 144 K/UL — LOW (ref 150–400)
POTASSIUM SERPL-MCNC: 4.1 MMOL/L — SIGNIFICANT CHANGE UP (ref 3.5–5.3)
POTASSIUM SERPL-SCNC: 4.1 MMOL/L — SIGNIFICANT CHANGE UP (ref 3.5–5.3)
PROT SERPL-MCNC: 7 G/DL — SIGNIFICANT CHANGE UP (ref 6–8.3)
PROT UR-MCNC: NEGATIVE — SIGNIFICANT CHANGE UP
RBC # BLD: 3.88 M/UL — SIGNIFICANT CHANGE UP (ref 3.8–5.2)
RBC # FLD: 14.9 % — HIGH (ref 10.3–14.5)
SALICYLATES SERPL-MCNC: <5 MG/DL — LOW (ref 15–30)
SARS-COV-2 RNA SPEC QL NAA+PROBE: SIGNIFICANT CHANGE UP
SODIUM SERPL-SCNC: 140 MMOL/L — SIGNIFICANT CHANGE UP (ref 135–145)
SP GR SPEC: 1.01 — SIGNIFICANT CHANGE UP (ref 1–1.05)
TOXICOLOGY SCREEN, DRUGS OF ABUSE, SERUM RESULT: SIGNIFICANT CHANGE UP
TROPONIN T, HIGH SENSITIVITY RESULT: 10 NG/L — SIGNIFICANT CHANGE UP
UROBILINOGEN FLD QL: SIGNIFICANT CHANGE UP
WBC # BLD: 4.34 K/UL — SIGNIFICANT CHANGE UP (ref 3.8–10.5)
WBC # FLD AUTO: 4.34 K/UL — SIGNIFICANT CHANGE UP (ref 3.8–10.5)

## 2021-10-13 PROCEDURE — 71045 X-RAY EXAM CHEST 1 VIEW: CPT | Mod: 26

## 2021-10-13 PROCEDURE — 93010 ELECTROCARDIOGRAM REPORT: CPT

## 2021-10-13 PROCEDURE — 99285 EMERGENCY DEPT VISIT HI MDM: CPT

## 2021-10-13 PROCEDURE — 99285 EMERGENCY DEPT VISIT HI MDM: CPT | Mod: 25

## 2021-10-13 RX ORDER — OLANZAPINE 15 MG/1
5 TABLET, FILM COATED ORAL ONCE
Refills: 0 | Status: COMPLETED | OUTPATIENT
Start: 2021-10-13 | End: 2021-10-13

## 2021-10-13 RX ORDER — METFORMIN HYDROCHLORIDE 850 MG/1
500 TABLET ORAL DAILY
Refills: 0 | Status: DISCONTINUED | OUTPATIENT
Start: 2021-10-13 | End: 2021-11-20

## 2021-10-13 RX ORDER — OLANZAPINE 15 MG/1
5 TABLET, FILM COATED ORAL ONCE
Refills: 0 | Status: DISCONTINUED | OUTPATIENT
Start: 2021-10-13 | End: 2021-10-13

## 2021-10-13 RX ORDER — FLUPHENAZINE HYDROCHLORIDE 1 MG/1
2.5 TABLET, FILM COATED ORAL
Refills: 0 | Status: DISCONTINUED | OUTPATIENT
Start: 2021-10-13 | End: 2021-10-18

## 2021-10-13 RX ORDER — CARBIDOPA AND LEVODOPA 25; 100 MG/1; MG/1
1 TABLET ORAL THREE TIMES A DAY
Refills: 0 | Status: DISCONTINUED | OUTPATIENT
Start: 2021-10-13 | End: 2021-11-20

## 2021-10-13 RX ORDER — TRAZODONE HCL 50 MG
50 TABLET ORAL AT BEDTIME
Refills: 0 | Status: DISCONTINUED | OUTPATIENT
Start: 2021-10-13 | End: 2021-11-20

## 2021-10-13 RX ORDER — GABAPENTIN 400 MG/1
100 CAPSULE ORAL THREE TIMES A DAY
Refills: 0 | Status: DISCONTINUED | OUTPATIENT
Start: 2021-10-13 | End: 2021-11-16

## 2021-10-13 RX ORDER — DIVALPROEX SODIUM 500 MG/1
750 TABLET, DELAYED RELEASE ORAL AT BEDTIME
Refills: 0 | Status: DISCONTINUED | OUTPATIENT
Start: 2021-10-13 | End: 2021-11-20

## 2021-10-13 RX ORDER — CLOPIDOGREL BISULFATE 75 MG/1
75 TABLET, FILM COATED ORAL DAILY
Refills: 0 | Status: DISCONTINUED | OUTPATIENT
Start: 2021-10-13 | End: 2021-11-20

## 2021-10-13 RX ORDER — LEVOTHYROXINE SODIUM 125 MCG
75 TABLET ORAL DAILY
Refills: 0 | Status: DISCONTINUED | OUTPATIENT
Start: 2021-10-13 | End: 2021-10-15

## 2021-10-13 RX ORDER — BENZTROPINE MESYLATE 1 MG
2 TABLET ORAL
Refills: 0 | Status: DISCONTINUED | OUTPATIENT
Start: 2021-10-13 | End: 2021-10-23

## 2021-10-13 RX ORDER — LATANOPROST 0.05 MG/ML
1 SOLUTION/ DROPS OPHTHALMIC; TOPICAL AT BEDTIME
Refills: 0 | Status: DISCONTINUED | OUTPATIENT
Start: 2021-10-13 | End: 2021-11-20

## 2021-10-13 RX ORDER — METOPROLOL TARTRATE 50 MG
25 TABLET ORAL
Refills: 0 | Status: DISCONTINUED | OUTPATIENT
Start: 2021-10-13 | End: 2021-11-20

## 2021-10-13 RX ORDER — CEPHALEXIN 500 MG
500 CAPSULE ORAL ONCE
Refills: 0 | Status: COMPLETED | OUTPATIENT
Start: 2021-10-13 | End: 2021-10-13

## 2021-10-13 RX ORDER — LISINOPRIL 2.5 MG/1
10 TABLET ORAL DAILY
Refills: 0 | Status: DISCONTINUED | OUTPATIENT
Start: 2021-10-13 | End: 2021-11-20

## 2021-10-13 RX ORDER — SENNA PLUS 8.6 MG/1
2 TABLET ORAL AT BEDTIME
Refills: 0 | Status: DISCONTINUED | OUTPATIENT
Start: 2021-10-13 | End: 2021-11-20

## 2021-10-13 RX ORDER — CEFPODOXIME PROXETIL 100 MG
200 TABLET ORAL EVERY 12 HOURS
Refills: 0 | Status: COMPLETED | OUTPATIENT
Start: 2021-10-13 | End: 2021-10-20

## 2021-10-13 RX ORDER — FLUPHENAZINE HYDROCHLORIDE 1 MG/1
2.5 TABLET, FILM COATED ORAL EVERY 6 HOURS
Refills: 0 | Status: DISCONTINUED | OUTPATIENT
Start: 2021-10-13 | End: 2021-10-18

## 2021-10-13 RX ADMIN — Medication 500 MILLIGRAM(S): at 15:17

## 2021-10-13 RX ADMIN — OLANZAPINE 5 MILLIGRAM(S): 15 TABLET, FILM COATED ORAL at 16:30

## 2021-10-13 NOTE — BH PATIENT PROFILE - HOME MEDICATIONS
haloperidol 1 mg oral tablet , 7 tab(s) orally   levothyroxine 75 mcg (0.075 mg) oral tablet , 1 tab(s) orally once a day  ocular lubricant ophthalmic solution , 1 drop(s) to each affected eye 2 times a day  levOCARNitine 330 mg oral tablet , 2 tab(s) orally 3 times a day (with meals)  senna oral tablet , 1 tab(s) orally 2 times a day  lactulose 10 g/15 mL oral syrup , 15 milliliter(s) orally once a day (at bedtime)  docusate sodium 100 mg oral capsule , 1 cap(s) orally 3 times a day  ammonium lactate 12% topical cream , 1 application topically 2 times a day  metoprolol tartrate 25 mg oral tablet , 1 tab(s) orally 2 times a day  aspirin 81 mg oral tablet, chewable , 1 tab(s) orally once a day  benztropine 0.5 mg oral tablet , 1 tab(s) orally 2 times a day  metFORMIN 500 mg oral tablet , 1 tab(s) orally   divalproex sodium 125 mg oral delayed release capsule , 6 cap(s) orally once a day (at bedtime)  lisinopril 10 mg oral tablet , 1 tab(s) orally once a day

## 2021-10-13 NOTE — ED BEHAVIORAL HEALTH ASSESSMENT NOTE - RISK ASSESSMENT
Acute risk  more aggressive at her facility, recent admission with no improvement of behavior, possibly more paranoid compared to baseline.  Her increased risk can be mitigated by a psychiatric admission with supervision, continuing psych meds with titration towards efficacious dosing range Low Acute Suicide Risk Acute risk  more aggressive at her facility, per collateral more paranoid/agitated compared to baseline.  Her increased risk can be mitigated by a psychiatric admission with supervision, continuing psych meds with titration towards efficacious dosing range

## 2021-10-13 NOTE — BH PATIENT PROFILE - STATED REASON FOR ADMISSION
Pt brought in following becoming agitated and aggressive to other pts in her nursing home. Pt presents hypervigilant and paranoid, disorganized, and requiring a lot of redirection and staff assistance in ambulating and all areas of ADLs.
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
## 2021-10-13 NOTE — ED BEHAVIORAL HEALTH ASSESSMENT NOTE - MEDICAL ISSUES AND PLAN (INCLUDE STANDING AND PRN MEDICATION)
continue current meds Carbidopa-levodopa 25-100mg 1 tab TID, clopidogrel Bisulfate 75 mg 1 ab daily, Depakote sprinkles capsuled  mg 6 tabs qhs, , Gabapentin 100mg  1 tab TOD, latanoprost solution 0.005% 1 drop OU qhs, metoprolol tartrate 25 mg 1 tab bid, senna 8.6 mg 2 tabs qhs, synthroid 75 mcg 1 tab daily, Zestril 10 mg 1 tab daily, For UTI medical recommends.... continue current meds Carbidopa-levodopa 25-100mg 1 tab TID, clopidogrel Bisulfate 75 mg 1 ab daily, Depakote sprinkles capsuled  mg 6 tabs qhs, , Gabapentin 100mg  1 tab TOD, latanoprost solution 0.005% 1 drop OU qhs, metoprolol tartrate 25 mg 1 tab bid, senna 8.6 mg 2 tabs qhs, synthroid 75 mcg 1 tab daily, Zestril 10 mg 1 tab daily, For UTI medical recommends cefpodoxime 200mg BID x 7 days, continue Carbidopa-levodopa 25-100mg 1 tab TID, clopidogrel Bisulfate 75 mg 1 ab daily, Depakote sprinkles capsuled  mg 6 tabs qhs, , Gabapentin 100mg 1 tab TID, latanoprost solution 0.005% 1 drop OU qhs, metoprolol tartrate 25 mg 1 tab bid, senna 8.6 mg 2 tabs qhs, synthroid 75 mcg 1 tab daily, Zestril 10 mg 1 tab daily, metformin 500mg PO daily.
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
## 2021-10-13 NOTE — ED BEHAVIORAL HEALTH ASSESSMENT NOTE - HPI (INCLUDE ILLNESS QUALITY, SEVERITY, DURATION, TIMING, CONTEXT, MODIFYING FACTORS, ASSOCIATED SIGNS AND SYMPTOMS)
Pt is a 71 y/o AAF, domiciled at [facility], long hx of Schizophrenia with multiple past inpatient hospitalization, last hospitalization as per psyckes 5/2018 at [facility], past hx of aggression towards staff and peers, no documented hx of SA, pertinent PMH includes HLD, HTN, Parkinson's dx. Seizure d/o, T2DM, Hypothyroidism, Glaucoma, PVD, Asthma, Bladder Ca, BIB EMS activated by sister for agitation and worsening hallucination. Pt was medically seen found to have a UTI  exam, she was medically cleared and psych was consulted for psychosis.     Upon assessment pt is easily agitated, very irritable, confrontational and uncooperative. She is a limited historian, not easily engaged with interview mumbling incoherently at times making it difficult to understand. She refused to cooperate with procedures, trying to attack staff (kicking), cursing explicit and threatening to hurt them. Staff tried on multiple attempts to verbally deescalate pt., however after several minutes this became futile and pt needed prns meds to help mitigate her disruptive behaviors. When questioned pt as to the reason why she was so angry, was unable/unwilling to elaborate.     In regards to mood symptoms, pt reports of feeling sad because her sister and niece are trying to steal her money. She was unable to say how she knows this but states she has "thousands of dollars" that she left "on the table", again unable to elaborate. She denies SI but reports HI towards family and staff. She was unable to identify other neurovegetative symptoms. She states that she has been having AH of "the lord is trying to speak with me." When questioned as to what he is saying, states "he wants to get me out of here. " She denies VH, feeling paranoid, or being harm by others.     See  note for collateral from sister. In short sister reports that pt seem more depressed, more altered and not making sense. He appetite has decreased, and not keeping up with her personal hygiene. Pt has been talking to himself and seeing "imaginary people." States pt is not at her baseline and is advocating for inpatient hospitalization.

## 2021-10-13 NOTE — ED ADULT NURSE REASSESSMENT NOTE - NS ED NURSE REASSESS COMMENT FT1
BREAK RN: Pt medically cleared by MD. Pt to go to . IV discontinued. Pt acting erratic. Wheelchaired to . Pt refusing to get undressed. Security paged and at bedside. Report given to ALICIA Xiao. Sneakers and sweater placed in  locker# 5
Pt was received in  from the main #27 dressed in her street clothes.  Pt was very combative and paranoid and responding to internal stimuli when asked to change her clothes into  gown.  Assistance was provided by security to change her and take her jewelry off.  Pt had to be medicated with olanzapine 5mg IM in the left deltoid.  She fell asleep on a stretcher for 2 hours.  She came out to use the bathroom and was disorganized and disoriented.  She was redirected and went back to sleep until EMS arrived.  No behavioral issues with EMS.

## 2021-10-13 NOTE — ED PROVIDER NOTE - OBJECTIVE STATEMENT
71 y/o F with  h/o Schizophrenia (haldol/valproate), Paranoia, Bladder CA, Parkinson's DM2, asthma sent in from [facility] via EMS stating the patient has been acting "erratic".   in the field. As per sister Natali Aj [phone] patient is talking to "imaginary people" and hearing voices of people "who are going to get her". At bedside patient denies hallucinations, SI. States however "tightness in her chest". Denies other pain, ab pain, n/v/d, dysuria, weakness, numbness, tingling.   Meds: Haldol, aspirin, valproic acid, benztropine, fluphenazine, gabapentin, levothyroxine, lisinopril, metoprolol, trazodone 71 y/o F with  h/o Schizophrenia (haldol/valproate), Paranoia, Bladder CA, Parkinson's DM2, asthma sent in from [facility] via EMS stating the patient has been acting "erratic".   in the field. As per sister Natali Aj [phone] patient is talking to "imaginary people" and hearing voices of people "who are going to get her". At bedside patient denies hallucinations, SI. States however "tightness in her chest". Denies other pain, ab pain, n/v/d, dysuria, weakness, numbness, tingling. Seen here 2 days ago in which UA, UCX, CT Ab/pelvis, CT head negative.

## 2021-10-13 NOTE — ED ADULT TRIAGE NOTE - CHIEF COMPLAINT QUOTE
[place] brought in from [place] as family called 911. Per EMS family requesting a psych eval for pt. as she has been acting erratic per staff and family Pt denies SI/HI. PMH schizophrenia, DM2, asthma.  in the field. Pt offers no medical complaints

## 2021-10-13 NOTE — ED BEHAVIORAL HEALTH NOTE - BEHAVIORAL HEALTH NOTE
Writer spoke with pt’s sister Natali Mcneil ([phone]) for the purpose of obtaining the following collateral information.     Ms. Mcneil stated she had called 911 today for pt after concerns were mounting regarding pt’s symptoms. Ms. Mcneil said that pt is “depressed” from “thinking too much,” which has been ongoing for about a month. Ms. Mcneil noted that pt has psychiatric hx and last psychiatric hospitalization was 2-3 years ago at [facility].     Ms. Mcneil said that pt has been “thinking too much” about her children who she has no relationship with. Her children, who are now in their fourties, do not have a relationship with pt and pt “does not accept that.” Pt’s children were placed in foster care and pt did not connect with them. Ms. Mcneil also believes that pt is perseverating on her children, sister, mother, and father.      Ms. Mcneil stated that pt is having conversations with “people who aren’t there.” She states that she sees and hears pt talking to herself. Ms. Mcneil knows that at one time pt was having a “conversation” with her daughter; the conversations are with people that pt knows.     Ms. Mcneil also noted that pt has “not been making sense.” Pt had asked Ms. Mcneil if their mother was dead (pt’s mother had been  for over 40 years as per Ms. Mcneil). Ms. Mcneil also said that pt had asked if there were people living in the apartment that she and her sister grew up in because she wanted to know if pt and her children could live there. Ms. Mcneil also states that pt said that her ex-boyfriend that pt used to date when “she was a young girl” came to her door and said “I’m going to get you and your whole family.” Ms. Mcneil notes that that is not possible as [place] lives [place] with [place] supervision.     As per Ms. Mcneil, pt not endorse SI and has no hx of SA. Pt not endorsing HI. Pt is not eating and has lost weight (unsure of how much). Pt’s food is served chopped up as she is missing teeth and has difficulty swallowing. Pt no longer wants to eat the food. Ms. Mcneil noted that pt would at times order delivery, but has stopped doing that as well. Pt does not like to shower because she says the bathrooms aren’t clean; Ms. Mcneil believes this to be true. Ms. Mcneil also notes that pt is being treated for urinary retention.    Writer to provide this collateral to treatment team. Writer spoke with pt’s sister Natali Mcneil ([phone]) for the purpose of obtaining the following collateral information.     Ms. Mcneil stated she had called 911 today for pt after concerns were mounting regarding pt’s symptoms. Ms. Mcneil said that pt is “depressed” from “thinking too much,” which has been ongoing for about a month. Ms. Mcneil noted that pt has psychiatric hx and last psychiatric hospitalization was 2-3 years ago at [facility].     Ms. Mcneil said that pt has been “thinking too much” about her children who she has no relationship with. Her children, who are now in their fourties, do not have a relationship with pt and pt “does not accept that.” Pt’s children were placed in foster care and pt did not connect with them. Ms. Mcneil also believes that pt is perseverating on her children, sister, mother, and father.      Ms. Mcneil stated that pt is having conversations with “people who aren’t there.” She states that she sees and hears pt talking to herself. Ms. Mcneil knows that at one time pt was having a “conversation” with her daughter; the conversations are with people that pt knows.     Ms. Mcneil also noted that pt has “not been making sense.” Pt had asked Ms. Mcneil if their mother was dead (pt’s mother had been  for over 40 years as per Ms. Mcneil). Ms. Mcneil also said that pt had asked if there were people living in the apartment that she and her sister grew up in because she wanted to know if pt and her children could live there. Ms. Mcniel also states that pt said that her ex-boyfriend that pt used to date when “she was a young girl” came to her door and said “I’m going to get you and your whole family.” Ms. Mcneil notes that that is not possible as [place] lives in [place] with staff supervision.     As per Ms. Mcneil, pt not endorse SI and has no hx of SA. Pt not endorsing HI. Pt is not eating and has lost weight (unsure of how much). Pt’s food is served chopped up as she is missing teeth and has difficulty swallowing. Pt no longer wants to eat the food. Ms. Mcneil noted that pt would at times order delivery, but has stopped doing that as well. Pt does not like to shower because she says the bathrooms aren’t clean; Ms. Mcneil believes this to be true. Ms. Mcneil also notes that pt is being treated for urinary retention.    Writer to provide this collateral to treatment team.    Writer called [facility] ([phone]) and spoke with Nurse Reyna SCHMITT.  Nurse stated that she just started her shift and found out that pt was in the ED. Nurse said that yesterday pt was “confused, out of her usual” and not making sense with her sentences. Nurse stated that pt has been seen talking to herself and saying that her sister is in one of the rooms (sister does not reside there). Nurse stated that pt is monitored when taking medications. Writer asked about medical conditions. Nurse noted that pt has Zbar4ET, a thyroid condition, and urinary retention. Pt refused olvera for retention and was “straight cut” yesterday.     Writer provided collateral information to treatment team.

## 2021-10-13 NOTE — ED BEHAVIORAL HEALTH ASSESSMENT NOTE - CURRENT MEDICATION
benztropine 2mg BID, Carbidopa-levodopa 25-100mg 1 tab TID, clopidogrel Bisulfate 75 mg 1 ab daily, Depakote sprinkles capsuled  mg 6 tabs qhs, fluphenazine HCL 2.5 mg 1 tab BID, Gabapentin 100mg  1 tab TOD, latanoprost solution 0.005% 1 drop OU qhs, metoprolol tartrate 25 mg 1 tab bid, senna 8.6 mg 2 tabs qhs, synthroid 75 mcg 1 tab daily, Zestril 10 mg 1 tab daily, benztropine 2mg BID, Carbidopa-levodopa 25-100mg 1 tab TID, clopidogrel Bisulfate 75 mg 1 ab daily, Depakote sprinkles capsuled  mg 6 tabs qhs, fluphenazine HCL 2.5 mg 1 tab BID, Gabapentin 100mg  1 tab TID, latanoprost solution 0.005% 1 drop OU qhs, metoprolol tartrate 25 mg 1 tab bid, senna 8.6 mg 2 tabs qhs, synthroid 75 mcg 1 tab daily, Zestril 10 mg 1 tab daily, Trazadone 50 mg qhs

## 2021-10-13 NOTE — ED BEHAVIORAL HEALTH ASSESSMENT NOTE - DETAILS
per protocol no documented past SA, denies SI As per HPI, pt has been more aggressive to staff and peers at her residence, aggressive with staff in the ED sister aware To SAUL Meyer

## 2021-10-13 NOTE — ED BEHAVIORAL HEALTH NOTE - BEHAVIORAL HEALTH NOTE
*Has the patient had a COVID-19 test in the last 90 days?  (  ) Yes   (X  ) No   (  ) Unknown- Reason: _____     IF YES PROCEED TO QUESTION #2. IF NO OR UNKNOWN, PLEASE SKIP TO QUESTION #3.     Date of test(s) and result(s): ________     *Has the patient tested positive for COVID-19 antibodies? (  ) Yes   (X  ) No   (  ) Unknown- Reason: _____     IF YES PROCEED TO QUESTION #4. IF NO or UNKNOWN, PLEASE SKIP TO QUESTION #5.     Date of positive antibody test: ________     *Has the patient received 2 doses of the COVID-19 vaccine? (X  ) Yes   (  ) No   (  ) Unknown- Reason: _____     IF YES PROCEED TO QUESTION #6. IF NO or UNKNOWN, PLEASE SKIP TO QUESTION #7.      Date of second dose: _2/20/21____     *In the past 10 days, has the patient been around anyone with a positive COVID-19 test?* (  ) Yes   ( X ) No   (  ) Unknown- Reason: __     IF YES PROCEED TO QUESTION #8. IF NO or UNKNOWN, PLEASE SKIP TO QUESTION #13.     Was the patient within 6 feet of them for at least 15 minutes? (  ) Yes   (  ) No   (  ) Unknown- Reason: _____     Did the patient provide care for them? (  ) Yes   (  ) No   (  ) Unknown- Reason: ______     Did the patient have direct physical contact with them (touched, hugged, or kissed them)? (  ) Yes   (  ) No    (  ) Unknown- Reason: __     Did the patient share eating or drinking utensils with them? (  ) Yes   (  ) No    (  ) Unknown- Reason: ____     Did they sneeze, cough, or somehow get respiratory droplets on the patient? (  ) Yes   (  ) No    (  ) Unknown- Reason: ______     *Has the patient been out of [place] within the past 10 days?* (  ) Yes   (  X) No   (  ) Unknown- Reason: _____     IF YES PLEASE ANSWER THE FOLLOWING QUESTIONS:     Which state/country did they go to? ______     Were they there over 24 hours? (  ) Yes   (  ) No    (  ) Unknown- Reason: ______     Date of return to [facility]: ______

## 2021-10-13 NOTE — ED PROVIDER NOTE - CLINICAL SUMMARY MEDICAL DECISION MAKING FREE TEXT BOX
71 y/o F with  h/o Schizophrenia (haldol/valproate), Paranoia, Bladder CA, Parkinson's DM2, asthma sent in from [facility] via EMS stating the patient has been acting "erratic".   in the field. As per sister Natali Aj [phone] patient is talking to "imaginary people" and hearing voices of people "who are going to get her". At bedside patient denies hallucinations, SI. States however "tightness in her chest". Denies other pain, ab pain, n/v/d, dysuria, weakness, numbness, tingling.   Plan: Will obtain basic labs with Trop, EKG, CXR and clear for Psych Eval

## 2021-10-13 NOTE — ED ADULT NURSE NOTE - NSIMPLEMENTINTERV_GEN_ALL_ED
Implemented All Fall Risk Interventions:  [place] to call system. Call bell, personal items and telephone within reach. Instruct patient to call for assistance. Room bathroom lighting operational. Non-slip footwear when patient is off stretcher. Physically safe environment: no spills, clutter or unnecessary equipment. Stretcher in lowest position, wheels locked, appropriate side rails in place. Provide visual cue, wrist band, yellow gown, etc. Monitor gait and stability. Monitor for mental status changes and reorient to person, place, and time. Review medications for side effects contributing to fall risk. Reinforce activity limits and safety measures with patient and family.

## 2021-10-13 NOTE — ED BEHAVIORAL HEALTH ASSESSMENT NOTE - PSYCHIATRIC ISSUES AND PLAN (INCLUDE STANDING AND PRN MEDICATION)
fluphenazine HCL 2.5 mg 1 tab BID, benztropine 2mg BID, prn fluphenazine 2.5 mg/ativan 1 mg PO/IM prn  for agitation. fluphenazine HCL 2.5 mg 1 tab BID, benztropine 2mg BID, prn fluphenazine 2.5 mg/ativan 1 mg PO/IM prn for agitation.

## 2021-10-13 NOTE — ED PROVIDER NOTE - ATTENDING CONTRIBUTION TO CARE
Dr Bloch, ATTENDING MD-  I performed a face to face bedside interview with patient regarding history of present illness, review of symptoms and past medical history. I completed an independent physical exam.  I have discussed patient's plan of care with PA.   Documentation as above in the note.   Patient thin, talking quietly, denies c/o at this time. perrl neck supple, heart s1s2, lungs clear, abd soft nontender,  moving all extremities, 2-12 intact.

## 2021-10-13 NOTE — ED PROVIDER NOTE - PROGRESS NOTE DETAILS
TAYO Frankel- spoke with Psych Dr. Tellez. Requesting COVID swab/Ab, UA, TSH, U tox, serum tox and alcohol level and will then come and see patient once resulted. TAYO MARIANO:  For treatment of UTI, pt should be treated cefpodoxime 200 mg BID x 7 days.

## 2021-10-13 NOTE — ED BEHAVIORAL HEALTH ASSESSMENT NOTE - SUMMARY
Pt is a 69 y/o AAF, domiciled at [facility], long hx of Schizophrenia with multiple past inpatient hospitalization, last hospitalization as per psyckes 5/2018 at [facility], past hx of aggression towards staff and peers, no documented hx of SA, pertinent PMH includes HLD, HTN, Parkinson's dx. Seizure d/o, T2DM, Hypothyroidism, Glaucoma, PVD, Asthma, Bladder Ca, BIB EMS activated by sister for agitation and worsening hallucination. Pt was medically seen found to have a UTI  exam, she was medically cleared and psych was consulted for psychosis.     Pt lacks insight and has poor judgment regarding her recent dysregulated behavior. Collateral is concerning for her  acute symptoms which has deviated from her baseline. Pt with worsening confusion which could be a residual derangement of her underlying uti, however she is not functioning, and meets criteria for involuntary psychiatric hospitalization for safety and stabilization.   -will admit to [facility] after med clearance

## 2021-10-13 NOTE — BH PATIENT PROFILE - NSPROEXTENSIONSOFSELF_GEN_A_NUR
Triage: Pt c/o N/V, +productive cough, chills, poor appetite since past Friday morning.  Pt labored and tachypneic in triage
eyeglasses

## 2021-10-13 NOTE — ED BEHAVIORAL HEALTH ASSESSMENT NOTE - DESCRIPTION
Agitated, uncooperative, unable to be verbally redirected. Pt needed prs meds.   Vital Signs Last 24 Hrs  T(C): 36.8 (13 Oct 2021 14:48), Max: 36.8 (13 Oct 2021 14:48)  T(F): 98.2 (13 Oct 2021 14:48), Max: 98.2 (13 Oct 2021 14:48)  HR: 88 (13 Oct 2021 14:48) (88 - 95)  BP: 146/74 (13 Oct 2021 14:48) (127/70 - 146/74)  BP(mean): --  RR: 18 (13 Oct 2021 14:48) (16 - 18)  SpO2: 100% (13 Oct 2021 14:48) (100% - 100%) HLD, HTN, Parkinson's dx. Seizure d/o, T2DM, Hypothyroidism, Glaucoma, PVD, Asthma, Bladder Ca, domiciled at [facility], has not lived independent since the 1990s, has a sister who is local but does not see her often

## 2021-10-14 LAB
CHOLEST SERPL-MCNC: 159 MG/DL — SIGNIFICANT CHANGE UP
COVID-19 SPIKE DOMAIN AB INTERP: POSITIVE
COVID-19 SPIKE DOMAIN ANTIBODY RESULT: >250 U/ML — HIGH
HDLC SERPL-MCNC: 66 MG/DL — SIGNIFICANT CHANGE UP
LIPID PNL WITH DIRECT LDL SERPL: 73 MG/DL — SIGNIFICANT CHANGE UP
NON HDL CHOLESTEROL: 93 MG/DL — SIGNIFICANT CHANGE UP
SARS-COV-2 IGG+IGM SERPL QL IA: >250 U/ML — HIGH
SARS-COV-2 IGG+IGM SERPL QL IA: POSITIVE
TRIGL SERPL-MCNC: 100 MG/DL — SIGNIFICANT CHANGE UP

## 2021-10-14 PROCEDURE — 99222 1ST HOSP IP/OBS MODERATE 55: CPT

## 2021-10-14 RX ORDER — MULTIVIT-MIN/FERROUS GLUCONATE 9 MG/15 ML
1 LIQUID (ML) ORAL DAILY
Refills: 0 | Status: DISCONTINUED | OUTPATIENT
Start: 2021-10-14 | End: 2021-11-20

## 2021-10-14 RX ADMIN — Medication 200 MILLIGRAM(S): at 12:58

## 2021-10-14 RX ADMIN — CARBIDOPA AND LEVODOPA 1 TABLET(S): 25; 100 TABLET ORAL at 22:03

## 2021-10-14 RX ADMIN — Medication 2 MILLIGRAM(S): at 00:18

## 2021-10-14 RX ADMIN — FLUPHENAZINE HYDROCHLORIDE 2.5 MILLIGRAM(S): 1 TABLET, FILM COATED ORAL at 22:03

## 2021-10-14 RX ADMIN — SENNA PLUS 2 TABLET(S): 8.6 TABLET ORAL at 22:03

## 2021-10-14 RX ADMIN — GABAPENTIN 100 MILLIGRAM(S): 400 CAPSULE ORAL at 22:02

## 2021-10-14 RX ADMIN — LATANOPROST 1 DROP(S): 0.05 SOLUTION/ DROPS OPHTHALMIC; TOPICAL at 22:04

## 2021-10-14 RX ADMIN — GABAPENTIN 100 MILLIGRAM(S): 400 CAPSULE ORAL at 12:59

## 2021-10-14 RX ADMIN — GABAPENTIN 100 MILLIGRAM(S): 400 CAPSULE ORAL at 09:48

## 2021-10-14 RX ADMIN — Medication 200 MILLIGRAM(S): at 22:03

## 2021-10-14 RX ADMIN — CARBIDOPA AND LEVODOPA 1 TABLET(S): 25; 100 TABLET ORAL at 12:58

## 2021-10-14 RX ADMIN — Medication 2 MILLIGRAM(S): at 22:03

## 2021-10-14 RX ADMIN — DIVALPROEX SODIUM 750 MILLIGRAM(S): 500 TABLET, DELAYED RELEASE ORAL at 22:02

## 2021-10-14 RX ADMIN — Medication 50 MILLIGRAM(S): at 22:02

## 2021-10-14 RX ADMIN — Medication 25 MILLIGRAM(S): at 22:03

## 2021-10-14 NOTE — BH INPATIENT PSYCHIATRY ASSESSMENT NOTE - CURRENT MEDICATION
MEDICATIONS  (STANDING):  benztropine 2 milliGRAM(s) Oral two times a day  carbidopa/levodopa  25/100 1 Tablet(s) Oral three times a day  cefpodoxime 200 milliGRAM(s) Oral every 12 hours  clopidogrel Tablet 75 milliGRAM(s) Oral daily  diVALproex Sprinkle 750 milliGRAM(s) Oral at bedtime  fluPHENAZine 2.5 milliGRAM(s) Oral two times a day  gabapentin 100 milliGRAM(s) Oral three times a day  latanoprost 0.005% Ophthalmic Solution 1 Drop(s) Both EYES at bedtime  levothyroxine 75 MICROGram(s) Oral daily  lisinopril 10 milliGRAM(s) Oral daily  metFORMIN 500 milliGRAM(s) Oral daily  metoprolol tartrate 25 milliGRAM(s) Oral two times a day  multivitamin/minerals 1 Tablet(s) Oral daily  senna 2 Tablet(s) Oral at bedtime  traZODone 50 milliGRAM(s) Oral at bedtime    MEDICATIONS  (PRN):  fluPHENAZine 2.5 milliGRAM(s) Oral every 6 hours PRN agitation

## 2021-10-14 NOTE — BH INPATIENT PSYCHIATRY ASSESSMENT NOTE - NSBHMETABOLIC_PSY_ALL_CORE_FT
BMI:   HbA1c:   Glucose: POCT Blood Glucose.: 71 mg/dL (10-14-21 @ 08:09)    BP: 138/92 (10-14-21 @ 08:26) (127/70 - 147/90)  Lipid Panel: Date/Time: 10-14-21 @ 09:38  Cholesterol, Serum: 159  Direct LDL: --  HDL Cholesterol, Serum: 66  Total Cholesterol/HDL Ration Measurement: --  Triglycerides, Serum: 100

## 2021-10-14 NOTE — BH INPATIENT PSYCHIATRY ASSESSMENT NOTE - NSBHCRANIAL_PSY_ALL_CORE
3/14/2018    CHIEF COMPLAINT: Chest pain. Hypertension.    HISTORY OF PRESENT ILLNESS:  61 year old female patient presents to the clinic for follow up after an ED visit 4 days ago.    Patient presented to urgent care after an episode of chest pain which lasted for 20 minutes. She described it as a \"pressure\" in her chest and was associated with shortness of breath, palpitations and mild lightheadedness. It was not worse with exertion. She states she was sitting down and not doing anything when the episode occurred.    She received four 81 mg Aspirin at urgent care and was referred to the ED. Two sets of troponins and EKG were negative. She also had a CT chest which was negative.    Patient quit smoking in 1998.    She reports that her mother had a stent when she was 64, and her grandmother passed away from \"heart issues\" when she was 71.    Her last 3 blood pressure readings have been elevated, including today's reading in our office.   148/92 on February 16, 2018. March 10, 2018 164/92. March 14, 2018 160/100    Her last lipid panel in our system was 3 years ago. LDL was 107.    Current Outpatient Prescriptions   Medication Sig Dispense Refill   • Vitamin Mixture (TED-C PO) Take by mouth daily.     • fluticasone (FLONASE) 50 MCG/ACT nasal spray Spray 1 spray in each nostril daily. 16 g 12   • Multiple Vitamins-Minerals (MULTIVITAL PO) Take 1 tablet by mouth daily.     • VITAMIN D, CHOLECALCIFEROL, PO Take 1 tablet by mouth daily.       No current facility-administered medications for this visit.        Past Medical History:   Diagnosis Date   • Fracture     FOOT         ALLERGIES:   Allergen Reactions   • Cephalosporins Other (See Comments)     Increased heart rate   • Strawberries [Strawberry]      Rash     Social History     Social History   • Marital status:      Spouse name: Epi   • Number of children: 1   • Years of education: N/A     Occupational History   • Registered Nurse [employer]      Social History Main Topics   • Smoking status: Former Smoker     Quit date: 6/26/1998   • Smokeless tobacco: Never Used   • Alcohol use No   • Drug use: No   • Sexual activity: Not on file     Other Topics Concern   • Not on file     Social History Narrative   • No narrative on file     Family History   Problem Relation Age of Onset   • Cancer Mother      Lung   • Other Father      Fall   • Cancer Father      Lung         REVIEW OF SYSTEMS:  GENERAL: No fever, chills, night sweats.  HEENT: No change in vision. Denies hearing loss. Denies hoarseness or swallowing problems.  CARDIAC: No shortness of breath. Atypical chest pain. No palpitations. No PND (paroxysmal nocturnal dyspnea), orthopnea, edema.  RESPIRATORY: No cough or wheezing.  GASTROINTESTINAL: Denies abdominal pain, heartburn, nausea, emesis. Denies changes in bowel habits.  MUSCULOSKELETAL: Denies new or worsening joint aches, muscle pains, and back pains.   NEUROLOGIC: Denies headache, weakness, seizures, syncope.  PSYCHOLOGIC: Denies depression, memory loss, inappropriate mood swings.  SKIN: Denies easy bruisability, hair loss or rashes.    PHYSICAL EXAMINATION:  VITAL SIGNS:    Visit Vitals  BP (!) 160/100 Comment: left arm,sitting, regular cuff   Pulse 98 Comment: apical,normal   Resp 12   Ht 5' 5\" (1.651 m)   Wt 103 kg   SpO2 97% Comment: room air   BMI 37.79 kg/m²       GENERAL: The patient is alert, oriented. Recent and remote memory are intact. Mood and affect are appropriate. Well-appearing, pleasant, obese, female.  HEENT: Pupils are equal and reactive to light and accommodation. Conjunctivae clear. Sclerae are non-red and anicteric. Extraocular movements are intact.  NECK: Carotids revealed no bruit or JVD (jugular venous distention). No thyromegaly or masses noted. Trachea is midline.  HEART: Regular rate and rhythm. Normal S1 and S2. No obvious murmurs, rubs or gallops  LUNGS: Clear to auscultation bilaterally. No obvious  wheezes, rales or rhonchi.  EXTREMITIES: Show no clubbing, or cyanosis. No peripheral edema. +2/4 radial pulses bilaterally, +2/4 posterior tibial pulses bilaterally.  SKIN: Color is pink, warm to touch, turgor is good, free from rashes.    TESTS REVIEWED:  EKG from March 11, 2018: Sinus tachycardia, otherwise normal  CT angiogram chest from March 10, 2018: No pulmonary embolism, minor atelectasis of linear fibrosis involving the lung bases.    Pertinent laboratory tests:  Lab Results   Component Value Date    SODIUM 140 03/10/2018    POTASSIUM 4.1 03/10/2018    CO2 26 03/10/2018    BUN 24 (H) 03/10/2018    CREATININE 0.83 03/10/2018    GFRNA 77 03/10/2018       Lab Results   Component Value Date    AST 23 03/23/2017    GPT 39 03/23/2017    ALKPT 81 03/23/2017    BILIRUBIN 0.4 03/23/2017       Lab Results   Component Value Date    GLUCOSE 107 (H) 03/10/2018    HGBA1C 5.9 01/03/2012       Lab Results   Component Value Date    WBC 4.5 03/10/2018    HCT 45.7 03/10/2018    HGB 15.7 (H) 03/10/2018     03/10/2018       No results found for: TSH    Lab Results   Component Value Date    CHOLESTEROL 180 03/25/2015    HDL 47 03/25/2015    CALCLDL 107 03/25/2015    TRIGLYCERIDE 128 03/25/2015       Lab Results   Component Value Date    BUN 24 (H) 03/10/2018    CREATININE 0.83 03/10/2018    GFRNA 77 03/10/2018           IMPRESSION:  1. Atypical Chest Pain: Given patient's risk factors and episode of chest pain, will order echocardiogram and stress test for further evaluation.  2. Hypertension: Patient's last 3 blood pressures in the system have been elevated. Patient has blood pressure cuff at home. She is encouraged to measure it twice daily until she follows up again. At that time, will review blood pressures and decide on medication regimen.      PREVENTIVE COUNSELING.  Discussed at length risk factor modification, including diet, exercise, weight loss and sodium intake.  Discussed blood pressure readings with patient  and lifestyle modification.    TEST ORDERED:  Echocardiogram, Stress Test, Lipid panel    FOLLOW UP: after testing listed above.    The assesment and plan were reviewed and discussed in detail with the patient. All questions answered. Patient understands and agrees with plan.    On 3/14/2018, Leo CARLOS PA-S, scribed the services personally performed by Vy Washburn MD    Electronically signed by SHERITA Elizabeth    The documentation recorded by the scribe accurately and completely reflects the service(s) I personally performed and the decisions made by me.       The documentation recorded by the scribe accurately and completely reflects the service(s) I personally performed and the decisions made by me.         Dr. Vy Washburn      CC: Audie Arriaga MD   Hearing intact (VIII)/Other...

## 2021-10-14 NOTE — BH INPATIENT PSYCHIATRY ASSESSMENT NOTE - OTHER PAST PSYCHIATRIC HISTORY (INCLUDE DETAILS REGARDING ONSET, COURSE OF ILLNESS, INPATIENT/OUTPATIENT TREATMENT)
hx of Schizophrenia with multiple past inpatient hospitalization, last hospitalization as per psyckes 5/2018 at [facility], past hx of aggression towards staff and peers, no documented hx of SA,

## 2021-10-14 NOTE — PSYCHIATRIC REHAB INITIAL EVALUATION - NSBHPRRECOMMEND_PSY_ALL_CORE
Writer met with patient in order to orient patient to the unit, introduce patient to Psychiatric Rehabilitation Staff, department functions and to establish a Psychiatric Rehabilitation goal. Upon approach, patient was calm, though disorganized with impaired speech and unable to tolerate a full interview. Patient was briefly oriented to group programming and encouraged to attend. Patient was admitted to [place] [id number] with a diagnosis of Schizoaffective disorder on 10/13/21 in the context of worsening agitation and aggression in her nursing home. Patient is currently being maintained on CO 1:1 for disorganization and unsteady gait. Per chart and observation, patient endorses disorganization, confusion, unclear AH/VH, agitation, irritability, aggression, poor behavioral control and low frustration tolerance. Per chart, patient does not endorse SI/HI. Patient was unable to discuss any psychosocial stressors contributing to the current episode. Patient was unable to identify a Psychiatric Rehabilitation goal. Therefore, a goal will be assigned for her and reassessed at a later time if needed. Psychiatric Rehabilitation Staff will continue to engage patient daily in order to develop rapport, provide support and assist patient in demonstrating progress towards Psychiatric Rehabilitation goals.

## 2021-10-14 NOTE — BH INPATIENT PSYCHIATRY ASSESSMENT NOTE - NSBHASSESSSUMMFT_PSY_ALL_CORE
Pt is a 69 y/o AAF, domiciled at [facility], long hx of Schizophrenia with multiple past inpatient hospitalization, last hospitalization as per psyckes 5/2018 at [facility], past hx of aggression towards staff and peers, no documented hx of SA, pertinent PMH includes HLD, HTN, Parkinson's dx. Seizure d/o, T2DM, Hypothyroidism, Glaucoma, PVD, Asthma, Bladder Ca, BIB EMS activated by sister for agitation and worsening hallucination. Pt was medically seen found to have a UTI  exam, she was medically cleared and psych was consulted for psychosis.   Pt lacks insight and has poor judgment regarding her recent dysregulated behavior. Collateral is concerning for her  acute symptoms which has deviated from her baseline. Pt with worsening confusion which could be a residual derangement of her underlying uti, however she is not functioning, and meets criteria for involuntary psychiatric hospitalization for safety and stabilization.     1. Admit to Low[facility], 9.39  2. CO needed for safety  3. Titrate Prolixin and continue with Trazodone 50mg QHS, Depakote 750mg QHS and Cogentin 2mg BID  4. Continue all home medications  5. Follow up on A1C ordered for 10/15

## 2021-10-14 NOTE — BH INPATIENT PSYCHIATRY ASSESSMENT NOTE - DESCRIPTION
domiciled at [facility], has not lived independent since the 1990s, has a sister who is local but does not see her often

## 2021-10-14 NOTE — BH SOCIAL WORK INITIAL PSYCHOSOCIAL EVALUATION - OTHER PAST PSYCHIATRIC HISTORY (INCLUDE DETAILS REGARDING ONSET, COURSE OF ILLNESS, INPATIENT/OUTPATIENT TREATMENT)
Pt. is a 69 y/o AAF domiciled at [facility] with long history of schizophrenia with multiple psychiatric hospitalizations, the most recent in May 2018 with history of aggression towards staff and peers.  Pt. was bib EMS activated by sister due to agitation and worsening hallucinations.  Pt. reports

## 2021-10-14 NOTE — DIETITIAN INITIAL EVALUATION ADULT. - PERTINENT MEDS FT
MEDICATIONS  (STANDING):  benztropine 2 milliGRAM(s) Oral two times a day  carbidopa/levodopa  25/100 1 Tablet(s) Oral three times a day  cefpodoxime 200 milliGRAM(s) Oral every 12 hours  clopidogrel Tablet 75 milliGRAM(s) Oral daily  diVALproex Sprinkle 750 milliGRAM(s) Oral at bedtime  fluPHENAZine 2.5 milliGRAM(s) Oral two times a day  gabapentin 100 milliGRAM(s) Oral three times a day  levothyroxine 75 MICROGram(s) Oral daily  lisinopril 10 milliGRAM(s) Oral daily  metFORMIN 500 milliGRAM(s) Oral daily  metoprolol tartrate 25 milliGRAM(s) Oral two times a day  senna 2 Tablet(s) Oral at bedtime

## 2021-10-14 NOTE — BH INPATIENT PSYCHIATRY ASSESSMENT NOTE - NSBHCHARTREVIEWVS_PSY_A_CORE FT
Vital Signs Last 24 Hrs  T(C): 36.1 (10-14-21 @ 08:26), Max: 36.8 (10-13-21 @ 14:48)  T(F): 97 (10-14-21 @ 08:26), Max: 98.2 (10-13-21 @ 14:48)  HR: 63 (10-14-21 @ 08:26) (63 - 88)  BP: 138/92 (10-14-21 @ 08:26) (138/92 - 147/90)  BP(mean): --  RR: 18 (10-14-21 @ 08:26) (16 - 18)  SpO2: 96% (10-14-21 @ 04:15) (96% - 100%)    Orthostatic VS  10-13-21 @ 22:42  Lying BP: --/-- HR: --  Sitting BP: 142/67 HR: 82  Standing BP: 155/74 HR: 91  Site: --  Mode: --

## 2021-10-14 NOTE — BH INPATIENT PSYCHIATRY ASSESSMENT NOTE - SUICIDALITY
February 12, 2021       Serafin Alexandra MD  [address] [facility] [address]  Via Fax: [phone]      Patient: Ashkan Nuñez   YOB: 1952   Date of Visit: 2/12/2021       Dear Dr. Alexandra:    Thank you for referring Ashkan Nuñez to me for evaluation. Below are my notes for this visit with him.    If you have questions, please do not hesitate to call me. I look forward to following your patient along with you.      Sincerely,        Roque Jones MD        CC: No Recipients  Roque Jones MD  2/12/2021 10:45 AM  Signed    2/12/2021    Serafin Alexandra MD    Chief Complaint   Patient presents with   • Office Visit       Mr. Sawyer returns for follow-up.  I initially saw him in November following a hospitalization with a stroke right carotid occlusion.  He is doing well with full-time employment and essentially no symptoms at all.  Specifically denies any weakness numbness or difficulties with speech or vision.  He is quite active at work and has no difficulty walking half a mile    He has been compliant with his medications but unfortunately continues to smoke about 4 to 5 cigarettes.  He is working hard on quitting using Nicorette gum.    He does not check his blood pressure at home, he does not have a machine        Review of Systems   Constitution: Negative for chills, malaise/fatigue, night sweats, weight gain and weight loss.   HENT: Negative for congestion, hoarse voice and nosebleeds.    Eyes: Negative for visual disturbance.   Cardiovascular: Negative for chest pain, claudication, dyspnea on exertion, leg swelling, orthopnea, palpitations and paroxysmal nocturnal dyspnea.   Respiratory: Negative for cough, hemoptysis, shortness of breath, sleep disturbances due to breathing and sputum production.    Endocrine: Negative for cold intolerance, heat intolerance, polydipsia and polyuria.   Hematologic/Lymphatic: Negative for adenopathy and bleeding problem. Does not bruise/bleed easily.    Skin: Negative for itching and rash.   Musculoskeletal: Negative for back pain, falls, joint pain, joint swelling, muscle cramps and muscle weakness.   Gastrointestinal: Negative for abdominal pain, change in bowel habit, hematemesis, hematochezia, melena and vomiting.   Genitourinary: Negative for dysuria, frequency, hematuria and nocturia.   Neurological: Negative for excessive daytime sleepiness, focal weakness, headaches, loss of balance, numbness and paresthesias.   Psychiatric/Behavioral: Negative for depression. The patient does not have insomnia and is not nervous/anxious.    Allergic/Immunologic: Negative.          ALLERGIES:  No Known Allergies    Current Medications:   Current Outpatient Medications   Medication   • atorvastatin (LIPITOR) 80 MG tablet   • clopidogrel (PLAVIX) 75 MG tablet   • amLODIPine (NORVASC) 10 MG tablet   • losartan (COZAAR) 100 MG tablet     No current facility-administered medications for this visit.        Social History:    Social History     Tobacco Use   Smoking Status Former Smoker   • Quit date:    • Years since quittin.1   Smokeless Tobacco Never Used        reports previous alcohol use.    Family History:  Family History   Problem Relation Age of Onset   • Cancer Mother    • Patient is unaware of any medical problems Father    • Glaucoma Maternal Aunt    • Hypertension Maternal Grandmother        Patient's medications, allergies, past medical, surgical, social and family histories were reviewed and updated as appropriate.    VITALS:   Blood pressure (!) 184/98, pulse 98, height 6' (1.829 m), weight 91.6 kg (202 lb).  Weight    21 0937   Weight: 91.6 kg (202 lb)       Physical Exam   Constitutional: He appears healthy. No distress.   HENT:   Nose: No nasal discharge.   Eyes: Conjunctivae are normal.   Neck: Thyroid normal. Neck supple. No JVD present. No neck adenopathy. No thyromegaly present.   Cardiovascular: Regular rhythm, S1 normal, S2 normal and  intact distal pulses. PMI is not displaced. Exam reveals no gallop.   No murmur heard.  Pulmonary/Chest: Effort normal and breath sounds normal. He has no wheezes. He has no rales. He exhibits no tenderness.   Abdominal: Soft. Bowel sounds are normal. He exhibits no distension and no mass. There is no hepatomegaly. There is no abdominal tenderness.   Musculoskeletal:         General: No edema.   Neurological: He is oriented to person, place, and time. He has normal motor skills and intact cranial nerves. Gait normal.   Skin: Skin is warm and dry. No cyanosis. Nails show no clubbing.       Diagnostic data:    Component      Latest Ref Rng & Units 2/11/2021   Sodium      135 - 145 mmol/L 144   Potassium      3.4 - 5.1 mmol/L 4.3   Chloride      98 - 107 mmol/L 109 (H)   CO2      21 - 32 mmol/L 26   ANION GAP      10 - 20 mmol/L 13   Glucose      65 - 99 mg/dL 95   BUN      6 - 20 mg/dL 16   Creatinine      0.67 - 1.17 mg/dL 1.02   Glomerular Filtration Rate      >90 mL/min/1.73m2 76 (L)   BUN/CREATININE RATIO      7 - 25 16   CALCIUM      8.4 - 10.2 mg/dL 9.9   TOTAL BILIRUBIN      0.2 - 1.0 mg/dL 1.1 (H)   AST/SGOT      <=37 Units/L 18   ALT/SGPT      <64 Units/L 24   ALK PHOSPHATASE      45 - 117 Units/L 68   Albumin      3.6 - 5.1 g/dL 4.5   TOTAL PROTEIN      6.4 - 8.2 g/dL 7.8   GLOBULIN      2.0 - 4.0 g/dL 3.3   A/G Ratio, Serum      1.0 - 2.4 1.4     Component      Latest Ref Rng & Units 2/11/2021   CHOLESTEROL      <=199 mg/dL 196   TRIGLYCERIDE      <=149 mg/dL 81   HDL      >=40 mg/dL 70   CALCULATED LDL      <=129 mg/dL 110   CALCULATED NON HDL      mg/dL 126   CHOL/HDL      <=4.4 2.8     Assessment/Plan:        Assessment   Problem List Items Addressed This Visit        Nervous    Stroke (CMS/HCC) - Primary     Presentation with gait difficulty 9/2020 work-up yielded punctate lacunar infarct in the right caudate and frontal area  Occluded right internal carotid artery    Doing well in this regard.  We will  continue with Plavix monotherapy.  Discussed the utmost importance of complete smoking cessation.  He is progressing well and continues to use nicotine replacement with gum.           Relevant Medications    losartan (COZAAR) 100 MG tablet    atorvastatin (LIPITOR) 80 MG tablet       Circulatory    Hypertension     His repeat blood pressure during exam is better at 150/80.  Nevertheless at this point we will going to add losartan 100 mg daily.  I asked him to start with half a tablet daily.  He agrees to get blood pressure cuff and get twice weekly home monitoring measurements.  We will have him come back in 3 weeks for nurse visit for blood pressure check.  We will get follow-up chemistry at that time after starting ARB.  Sodium restriction has been discussed.         Relevant Medications    losartan (COZAAR) 100 MG tablet    atorvastatin (LIPITOR) 80 MG tablet    Mitral valve regurgitation    Relevant Medications    losartan (COZAAR) 100 MG tablet    atorvastatin (LIPITOR) 80 MG tablet       Endocrine    Mixed hyperlipidemia     His cholesterol is improved however his LDL is above target at 110.  Will increase atorvastatin to 80 mg daily.  Heart healthy low-cholesterol diet has been discussed.  Repeat check ordered.  Or may have to consider adding ezetimibe accordingly aggressive treatment is warranted with LDL target 70 or less         Relevant Medications    losartan (COZAAR) 100 MG tablet    atorvastatin (LIPITOR) 80 MG tablet    Other Relevant Orders    COMPREHENSIVE METABOLIC PANEL    LIPID PANEL WITH REFLEX          Return in about 4 months (around 6/12/2021).    [facility] Shailesh Jones MD, FACC              No

## 2021-10-14 NOTE — BH INPATIENT PSYCHIATRY ASSESSMENT NOTE - DETAILS
As per HPI, pt has been more aggressive to staff and peers at her residence, aggressive with staff in the ED and on the unit no documented past SA, denies SI

## 2021-10-14 NOTE — BH INPATIENT PSYCHIATRY ASSESSMENT NOTE - RISK ASSESSMENT
Acute risk  more aggressive at her facility, per collateral more paranoid/agitated compared to baseline.  Her increased risk can be mitigated by a psychiatric admission with supervision, continuing psych meds with titration towards efficacious dosing range

## 2021-10-14 NOTE — DIETITIAN INITIAL EVALUATION ADULT. - OTHER INFO
Patient admitted d/t agitation and worsening hallucinations, psychosis. Patient domiciled at [facility]. As per EMR, 's note 10/13, patient needs food chopped up d/t missing teeth and has difficulty swallowing. Patient with decreased po intake and weight loss PTA, amount unknown. Patient standing in day room, breakfast tray infront of her. Witnessed patient take plate of food and turn it over onto tray. Patient did not eat breakfast. Unable to interview as patient is disorganized and excitable.  Missing upper teeth and some lower teeth. Recommend mechanical soft diet, Glucerna shake 8oz x3, and multivitamin.

## 2021-10-14 NOTE — BH INPATIENT PSYCHIATRY ASSESSMENT NOTE - HPI (INCLUDE ILLNESS QUALITY, SEVERITY, DURATION, TIMING, CONTEXT, MODIFYING FACTORS, ASSOCIATED SIGNS AND SYMPTOMS)
What you need to know:  1 . Advance your diet to moist meats. Follow the handout that you were given today in the office. 2. Take the recommended vitamins daily  3 No lifting greater than 40 lbs. 4. You can do light jogging, moderate walking and a recumbent bike. 5 Follow up in 2 weeks. 6. You may go into a pool. 7. If you are not able to tolerate liquids, soft foods or moist meats. Please call our office. [phone]  8. If you have vomiting and persistent epigastric pain or chest pain. You should call our office, the doctor on-call or go to the emergency room. What to do if you are constipated: You may  take Milk of Magnesia. Take 2 Tablespoons followed by 16 oz of water then 2 hours later take another 2 tablespoons. If  milk of magnesia does not work then take [place] or Miralax over the counter. Keep in mind that the Benefiber or Miralax may take a day or two to work. If all of the above do not work try a Fleets enema and follow the directions on the box. Shopping List Staples     Soft Mushy Diet: Phase 2 - Moist Meats     Below is a list of moist meats that you can now introduce into your diet. Moist Meats: Prepare food to the appropriate texture using low-fat cooking   methods       ? Tuna packed in water (strain before eating)  ? White flaky fish (torrey, cod, flounder, tilapia, salmon)   ? White chicken breast packed in water (strain before eating)  ? 96-99% fat free thinly sliced deli meat (ham, turkey, roast beef)  ? Fat free non-stick spray  ? Silken Tofu  ? Low-fat or vegetarian refried beans  ? Well-cooked beans and lentils  ? Skinless turkey or chicken (prepare to a soft texture)  ? 93% lean pureed beef (round or sirloin only)  ? Lean pork (cooked until very tender, cut into small pieces)  ? Eggs (preferable egg whites)  ? Egg substitutes  ? Herbs and spices  ?  Lite butter, margarine, canola oil, olive oil, reduced-fat or fat-free chua, reduced-fat or fat-free salad dressing, reduced-fat or fat-free cream cheese, reduced-fat or fat-free sour cream, lemon juice, salt, pepper, mustard, ketchup, salsa. See patient handbook for more low-fat cooking ideas. ? Be sure to use moist methods of cooking. Avoid microwaving meats because it can dry out the food making it harder to tolerate. Soft Mushy Diet: Phase 2  Time Meal or Snack Soft/Mushy Food Amount (ounces) Protein  (g) Supplement   6:30 am Sip on Fluids Sip on non-carbonated, calorie-free, no sugar added liquids. 8 oz   0 g Take Multivitamin containing 18 mg ferrous sulfate (iron)    7:00 am   Stop drinking fluids 30 minutes before breakfast   7:30 am Breakfast ¼ cup soft scrambled egg or egg substitute   ¼ cup canned fruit (packed in natural juice, strained)  4 oz    7 g    9:00 Snack  (optional) ½ cup low-fat or fat-free yogurt   or   ½ cup cottage cheese  4oz 4g  or  14 g    11:30 am Stop drinking liquids 30 minutes before lunch   12:00 pm Lunch ¼ cup low-fat or lean deli meat  with  ¼ well cooked green beans 4 oz  14 g Take 400 mg calcium citrate   2:00 Snack  (optional) ½ cup high protein, sugar-free pudding with 1 scoop added protein powder (see recipe in book). 4 oz 14 g      3:00 - 5:30 pm   Sip on Fluids   Sip on non-carbonated, calorie-free, no sugar added liquids. 24 - 32 oz   0 g   Take 400 mg calcium citrate. 6:00 pm Dinner ¼ cup soft/flaky fish (tilapia, flounder, tuna)  ¼ cup mashed potatoes or pureed cauliflower mashed potatoes (consider adding protein powder)  4 oz 14 g Take 400 mg of calcium citrate.    7:00 - 10:00 pm Sip on Fluids Sip on non-carbonated, no sugar added liquids as needed  16-24 oz 0 g Take Multivitamin with 18 mg ferrous sulfate   Total:  64 oz fluids 63  grams 2 Multivitamins with 18 mg ferrous sulfate, [id number] mg calcium citrate On the unit, patient is very disorganized, tangential and easily agitated. She needs constant redirection and is placed on CO for safety. Patient was unable to participate in interview in a meaningful way. She denied SI and cursed at writer for asking her about suicidal thoughts. Patient would also start having conversation with herself during, actively responding to internal stimuli.      As per [facility] ED Assessment:  "Pt is a 71 y/o AAF, domiciled at [facility], long hx of Schizophrenia with multiple past inpatient hospitalization, last hospitalization as per psyckes 5/2018 at [facility], past hx of aggression towards staff and peers, no documented hx of SA, pertinent PMH includes HLD, HTN, Parkinson's dx. Seizure d/o, T2DM, Hypothyroidism, Glaucoma, PVD, Asthma, Bladder Ca, BIB EMS activated by sister for agitation and worsening hallucination. Pt was medically seen found to have a UTI  exam, she was medically cleared and psych was consulted for psychosis.     Upon assessment pt is easily agitated, very irritable, confrontational and uncooperative. She is a limited historian, not easily engaged with interview mumbling incoherently at times making it difficult to understand. She refused to cooperate with procedures, trying to attack staff (kicking), cursing explicit and threatening to hurt them. Staff tried on multiple attempts to verbally deescalate pt., however after several minutes this became futile and pt needed prns meds to help mitigate her disruptive behaviors. When questioned pt as to the reason why she was so angry, was unable/unwilling to elaborate.     In regards to mood symptoms, pt reports of feeling sad because her sister and niece are trying to steal her money. She was unable to say how she knows this but states she has "thousands of dollars" that she left "on the table", again unable to elaborate. She denies SI but reports HI towards family and staff. She was unable to identify other neurovegetative symptoms. She states that she has been having AH of "the lord is trying to speak with me." When questioned as to what he is saying, states "he wants to get me out of here. " She denies VH, feeling paranoid, or being harm by others.     See  note for collateral from sister. In short sister reports that pt seem more depressed, more altered and not making sense. He appetite has decreased, and not keeping up with her personal hygiene. Pt has been talking to himself and seeing "imaginary people." States pt is not at her baseline and is advocating for inpatient hospitalization."

## 2021-10-14 NOTE — PSYCHIATRIC REHAB INITIAL EVALUATION - NSBHSIGPRIORMED_PSY_ALL_CORE
Per chart, patient has PMH HLD, HTN, Parkinson's dx. Seizure d/o, T2DM, Hypothyroidism, Glaucoma, PVD, Asthma, Bladder Ca,/Yes (Specify)

## 2021-10-14 NOTE — BH INPATIENT PSYCHIATRY ASSESSMENT NOTE - NSBHCONSBHPROVDETAILS_PSY_A_CORE  FT
Spoke with NP Elizabeth [phone] ext [phone]  Elizabeth reported that the Prolixin was stopped in May and just restarted about 1 week ago due to worsening psychosis. Patient stopped eating and lost about 18lbs in one month. Patient was previously stabilized on Prolixin 5mg BID. Patient at baseline is calm, cooperative, pleasant and not aggressive.

## 2021-10-15 LAB
A1C WITH ESTIMATED AVERAGE GLUCOSE RESULT: 5.6 % — SIGNIFICANT CHANGE UP (ref 4–5.6)
ESTIMATED AVERAGE GLUCOSE: 114 — SIGNIFICANT CHANGE UP

## 2021-10-15 PROCEDURE — 99232 SBSQ HOSP IP/OBS MODERATE 35: CPT

## 2021-10-15 RX ORDER — POLYETHYLENE GLYCOL 3350 17 G/17G
8.5 POWDER, FOR SOLUTION ORAL DAILY
Refills: 0 | Status: DISCONTINUED | OUTPATIENT
Start: 2021-10-15 | End: 2021-11-20

## 2021-10-15 RX ORDER — FLUPHENAZINE HYDROCHLORIDE 1 MG/1
2 TABLET, FILM COATED ORAL ONCE
Refills: 0 | Status: DISCONTINUED | OUTPATIENT
Start: 2021-10-15 | End: 2021-10-19

## 2021-10-15 RX ORDER — TRAZODONE HCL 50 MG
25 TABLET ORAL AT BEDTIME
Refills: 0 | Status: DISCONTINUED | OUTPATIENT
Start: 2021-10-15 | End: 2021-11-20

## 2021-10-15 RX ORDER — LEVOTHYROXINE SODIUM 125 MCG
75 TABLET ORAL
Refills: 0 | Status: DISCONTINUED | OUTPATIENT
Start: 2021-10-15 | End: 2021-11-20

## 2021-10-15 RX ADMIN — CLOPIDOGREL BISULFATE 75 MILLIGRAM(S): 75 TABLET, FILM COATED ORAL at 09:01

## 2021-10-15 RX ADMIN — LISINOPRIL 10 MILLIGRAM(S): 2.5 TABLET ORAL at 09:00

## 2021-10-15 RX ADMIN — LATANOPROST 1 DROP(S): 0.05 SOLUTION/ DROPS OPHTHALMIC; TOPICAL at 20:57

## 2021-10-15 RX ADMIN — FLUPHENAZINE HYDROCHLORIDE 2.5 MILLIGRAM(S): 1 TABLET, FILM COATED ORAL at 20:56

## 2021-10-15 RX ADMIN — CARBIDOPA AND LEVODOPA 1 TABLET(S): 25; 100 TABLET ORAL at 20:57

## 2021-10-15 RX ADMIN — GABAPENTIN 100 MILLIGRAM(S): 400 CAPSULE ORAL at 09:00

## 2021-10-15 RX ADMIN — Medication 2 MILLIGRAM(S): at 20:56

## 2021-10-15 RX ADMIN — POLYETHYLENE GLYCOL 3350 8.5 GRAM(S): 17 POWDER, FOR SOLUTION ORAL at 14:09

## 2021-10-15 RX ADMIN — Medication 2 MILLIGRAM(S): at 01:40

## 2021-10-15 RX ADMIN — CARBIDOPA AND LEVODOPA 1 TABLET(S): 25; 100 TABLET ORAL at 09:01

## 2021-10-15 RX ADMIN — DIVALPROEX SODIUM 750 MILLIGRAM(S): 500 TABLET, DELAYED RELEASE ORAL at 20:53

## 2021-10-15 RX ADMIN — Medication 75 MICROGRAM(S): at 09:01

## 2021-10-15 RX ADMIN — Medication 2 MILLIGRAM(S): at 09:01

## 2021-10-15 RX ADMIN — SENNA PLUS 2 TABLET(S): 8.6 TABLET ORAL at 20:56

## 2021-10-15 RX ADMIN — Medication 1 TABLET(S): at 09:01

## 2021-10-15 RX ADMIN — Medication 200 MILLIGRAM(S): at 20:56

## 2021-10-15 RX ADMIN — FLUPHENAZINE HYDROCHLORIDE 2.5 MILLIGRAM(S): 1 TABLET, FILM COATED ORAL at 09:01

## 2021-10-15 RX ADMIN — Medication 200 MILLIGRAM(S): at 09:00

## 2021-10-15 RX ADMIN — CARBIDOPA AND LEVODOPA 1 TABLET(S): 25; 100 TABLET ORAL at 14:09

## 2021-10-15 RX ADMIN — Medication 2 MILLIGRAM(S): at 23:24

## 2021-10-15 RX ADMIN — Medication 25 MILLIGRAM(S): at 20:56

## 2021-10-15 RX ADMIN — GABAPENTIN 100 MILLIGRAM(S): 400 CAPSULE ORAL at 14:09

## 2021-10-15 RX ADMIN — Medication 50 MILLIGRAM(S): at 20:56

## 2021-10-15 RX ADMIN — METFORMIN HYDROCHLORIDE 500 MILLIGRAM(S): 850 TABLET ORAL at 09:01

## 2021-10-15 RX ADMIN — GABAPENTIN 100 MILLIGRAM(S): 400 CAPSULE ORAL at 20:56

## 2021-10-15 RX ADMIN — Medication 25 MILLIGRAM(S): at 09:01

## 2021-10-15 NOTE — BH INPATIENT PSYCHIATRY PROGRESS NOTE - NSBHCHARTREVIEWVS_PSY_A_CORE FT
Vital Signs Last 24 Hrs  T(C): 36.9 (10-15-21 @ 14:42), Max: 37.1 (10-14-21 @ 15:28)  T(F): 98.4 (10-15-21 @ 14:42), Max: 98.8 (10-14-21 @ 15:28)  HR: --  BP: --  BP(mean): --  RR: --  SpO2: --    Orthostatic VS  10-14-21 @ 20:30  Lying BP: --/-- HR: --  Sitting BP: 121/75 HR: 80  Standing BP: --/-- HR: --  Site: --  Mode: --  Orthostatic VS  10-13-21 @ 22:42  Lying BP: --/-- HR: --  Sitting BP: 142/67 HR: 82  Standing BP: 155/74 HR: 91  Site: --  Mode: --

## 2021-10-15 NOTE — BH INPATIENT PSYCHIATRY PROGRESS NOTE - NSBHFUPINTERVALHXFT_PSY_A_CORE
Patient seen for schizophrenia. Patient sleep poorly, got one time dose Ativan. Was loud , irritable. Today eating fair, somewhat uncooperative irritable for example initially refused vitals.

## 2021-10-15 NOTE — BH INPATIENT PSYCHIATRY PROGRESS NOTE - NSBHASSESSSUMMFT_PSY_ALL_CORE
10/15  Patient with hx schizophrenia residing in SNF admitted for paranoia agitation in setting of medication disruption for unclear reason. Patient here presents as disorganized, some disorientation, irritable, suspicious. Patient supposed does well on Prolixin 5mg bid. Patient likely to have med induced EPS, not Parkinson's Disease. Often Sinemet is not good choice for EPS but difficult to assess for tapering without more collateral.  Will leave Prolixin at 2.5mg bid with plan to taper. Explore possibility of using atypical ( was dced on Abilify last time here ) and then trying to taper Sinemet and or Cogentin. Will cont CO during day til shows less disorganization., Requested PT and Ob Gyn consult. Added trazodone prn qhs insomnia to avoid BDZ.

## 2021-10-15 NOTE — BH INPATIENT PSYCHIATRY PROGRESS NOTE - NSBHMETABOLIC_PSY_ALL_CORE_FT
BMI:   HbA1c: A1C with Estimated Average Glucose Result: 5.6 % (10-15-21 @ 11:00)    Glucose: POCT Blood Glucose.: 71 mg/dL (10-14-21 @ 08:09)    BP: 138/92 (10-14-21 @ 08:26) (127/70 - 147/90)  Lipid Panel: Date/Time: 10-14-21 @ 09:38  Cholesterol, Serum: 159  Direct LDL: --  HDL Cholesterol, Serum: 66  Total Cholesterol/HDL Ration Measurement: --  Triglycerides, Serum: 100

## 2021-10-15 NOTE — BH INPATIENT PSYCHIATRY PROGRESS NOTE - CURRENT MEDICATION
MEDICATIONS  (STANDING):  benztropine 2 milliGRAM(s) Oral two times a day  carbidopa/levodopa  25/100 1 Tablet(s) Oral three times a day  cefpodoxime 200 milliGRAM(s) Oral every 12 hours  clopidogrel Tablet 75 milliGRAM(s) Oral daily  diVALproex Sprinkle 750 milliGRAM(s) Oral at bedtime  fluPHENAZine 2.5 milliGRAM(s) Oral two times a day  gabapentin 100 milliGRAM(s) Oral three times a day  latanoprost 0.005% Ophthalmic Solution 1 Drop(s) Both EYES at bedtime  levothyroxine 75 MICROGram(s) Oral <User Schedule>  lisinopril 10 milliGRAM(s) Oral daily  metFORMIN 500 milliGRAM(s) Oral daily  metoprolol tartrate 25 milliGRAM(s) Oral two times a day  multivitamin/minerals 1 Tablet(s) Oral daily  polyethylene glycol 3350 8.5 Gram(s) Oral daily  senna 2 Tablet(s) Oral at bedtime  traZODone 50 milliGRAM(s) Oral at bedtime    MEDICATIONS  (PRN):  fluPHENAZine 2.5 milliGRAM(s) Oral every 6 hours PRN agitation  fluPHENAZine  Injectable 2 milliGRAM(s) IntraMuscular once PRN severe agitation  traZODone 25 milliGRAM(s) Oral at bedtime PRN insomnia

## 2021-10-16 PROCEDURE — 99232 SBSQ HOSP IP/OBS MODERATE 35: CPT

## 2021-10-16 RX ADMIN — CARBIDOPA AND LEVODOPA 1 TABLET(S): 25; 100 TABLET ORAL at 12:45

## 2021-10-16 RX ADMIN — Medication 200 MILLIGRAM(S): at 08:28

## 2021-10-16 RX ADMIN — CARBIDOPA AND LEVODOPA 1 TABLET(S): 25; 100 TABLET ORAL at 08:21

## 2021-10-16 RX ADMIN — Medication 25 MILLIGRAM(S): at 08:29

## 2021-10-16 RX ADMIN — DIVALPROEX SODIUM 750 MILLIGRAM(S): 500 TABLET, DELAYED RELEASE ORAL at 20:20

## 2021-10-16 RX ADMIN — METFORMIN HYDROCHLORIDE 500 MILLIGRAM(S): 850 TABLET ORAL at 08:29

## 2021-10-16 RX ADMIN — FLUPHENAZINE HYDROCHLORIDE 2.5 MILLIGRAM(S): 1 TABLET, FILM COATED ORAL at 08:25

## 2021-10-16 RX ADMIN — CARBIDOPA AND LEVODOPA 1 TABLET(S): 25; 100 TABLET ORAL at 20:19

## 2021-10-16 RX ADMIN — CLOPIDOGREL BISULFATE 75 MILLIGRAM(S): 75 TABLET, FILM COATED ORAL at 08:29

## 2021-10-16 RX ADMIN — SENNA PLUS 2 TABLET(S): 8.6 TABLET ORAL at 20:18

## 2021-10-16 RX ADMIN — FLUPHENAZINE HYDROCHLORIDE 2.5 MILLIGRAM(S): 1 TABLET, FILM COATED ORAL at 20:20

## 2021-10-16 RX ADMIN — Medication 25 MILLIGRAM(S): at 20:19

## 2021-10-16 RX ADMIN — Medication 50 MILLIGRAM(S): at 20:19

## 2021-10-16 RX ADMIN — LATANOPROST 1 DROP(S): 0.05 SOLUTION/ DROPS OPHTHALMIC; TOPICAL at 21:42

## 2021-10-16 RX ADMIN — GABAPENTIN 100 MILLIGRAM(S): 400 CAPSULE ORAL at 08:23

## 2021-10-16 RX ADMIN — Medication 200 MILLIGRAM(S): at 20:18

## 2021-10-16 RX ADMIN — LISINOPRIL 10 MILLIGRAM(S): 2.5 TABLET ORAL at 08:40

## 2021-10-16 RX ADMIN — Medication 1 TABLET(S): at 08:29

## 2021-10-16 RX ADMIN — Medication 2 MILLIGRAM(S): at 20:18

## 2021-10-16 RX ADMIN — Medication 2 MILLIGRAM(S): at 08:23

## 2021-10-16 RX ADMIN — POLYETHYLENE GLYCOL 3350 8.5 GRAM(S): 17 POWDER, FOR SOLUTION ORAL at 12:57

## 2021-10-16 RX ADMIN — GABAPENTIN 100 MILLIGRAM(S): 400 CAPSULE ORAL at 20:19

## 2021-10-16 RX ADMIN — Medication 75 MICROGRAM(S): at 06:42

## 2021-10-16 RX ADMIN — GABAPENTIN 100 MILLIGRAM(S): 400 CAPSULE ORAL at 12:44

## 2021-10-16 NOTE — BH INPATIENT PSYCHIATRY PROGRESS NOTE - NSBHASSESSSUMMFT_PSY_ALL_CORE
10/15  Patient with hx schizophrenia residing in SNF admitted for paranoia agitation in setting of medication disruption for unclear reason. Patient here presents as disorganized, some disorientation, irritable, suspicious. Patient supposed does well on Prolixin 5mg bid. Patient likely to have med induced EPS, not Parkinson's Disease. Often Sinemet is not good choice for EPS but difficult to assess for tapering without more collateral.  Will leave Prolixin at 2.5mg bid with plan to taper. Explore possibility of using atypical ( was dced on Abilify last time here ) and then trying to taper Sinemet and or Cogentin. Will cont CO during day til shows less disorganization., Requested PT and Ob Gyn consult. Added trazodone prn qhs insomnia to avoid BDZ.   10/16: persecutory delusional, disorganized and irritable requiring PRN med. No SI/HI, will continue CO, prolixin trial.

## 2021-10-16 NOTE — BH INPATIENT PSYCHIATRY PROGRESS NOTE - NSBHCHARTREVIEWVS_PSY_A_CORE FT
Vital Signs Last 24 Hrs  T(C): 36.3 (10-16-21 @ 08:29), Max: 36.3 (10-16-21 @ 08:29)  T(F): 97.3 (10-16-21 @ 08:29), Max: 97.3 (10-16-21 @ 08:29)  HR: --  BP: --  BP(mean): --  RR: --  SpO2: --    Orthostatic VS  10-16-21 @ 08:29  Lying BP: --/-- HR: --  Sitting BP: 123/88 HR: 81  Standing BP: 109/82 HR: 96  Site: upper left arm  Mode: electronic  Orthostatic VS  10-15-21 @ 20:36  Lying BP: --/-- HR: --  Sitting BP: 144/81 HR: 72  Standing BP: --/-- HR: --  Site: --  Mode: --  Orthostatic VS  10-15-21 @ 09:00  Lying BP: --/-- HR: --  Sitting BP: 126/75 HR: 87  Standing BP: 119/89 HR: 78  Site: --  Mode: --  Orthostatic VS  10-14-21 @ 20:30  Lying BP: --/-- HR: --  Sitting BP: 121/75 HR: 80  Standing BP: --/-- HR: --  Site: --  Mode: --

## 2021-10-16 NOTE — PHYSICAL THERAPY INITIAL EVALUATION ADULT - PERTINENT HX OF CURRENT PROBLEM, REHAB EVAL
Patient is a 71 y/o AAF, domiciled at [facility], long hx of Schizophrenia with multiple past inpatient hospitalization, last hospitalization as per psyckes 5/2018 at [facility], PMH includes HLD, HTN, Parkinson's dx. Seizure d/o, T2DM, Hypothyroidism, Glaucoma, PVD, Asthma, Bladder Ca, BIB EMS activated by sister for agitation and worsening hallucination. Pt was medically seen found to have a UTI. Patient referred to PT secondary to deconditioning and med induces EPS.

## 2021-10-16 NOTE — BH INPATIENT PSYCHIATRY PROGRESS NOTE - NSBHMETABOLIC_PSY_ALL_CORE_FT
BMI:   HbA1c: A1C with Estimated Average Glucose Result: 5.6 % (10-15-21 @ 11:00)    Glucose: POCT Blood Glucose.: 71 mg/dL (10-14-21 @ 08:09)    BP: 138/92 (10-14-21 @ 08:26) (138/92 - 147/90)  Lipid Panel: Date/Time: 10-14-21 @ 09:38  Cholesterol, Serum: 159  Direct LDL: --  HDL Cholesterol, Serum: 66  Total Cholesterol/HDL Ration Measurement: --  Triglycerides, Serum: 100

## 2021-10-16 NOTE — BH INPATIENT PSYCHIATRY PROGRESS NOTE - NSBHFUPINTERVALHXFT_PSY_A_CORE
Patient seen for schizophrenia, chart reviewed and discussed with nursing staff. Per chart pt is compliant with meds, received PRN ativan last night around 11.24  pm for agitation. On exam, Pt reports feeling ok, sleep was not good and appetite is ok. Endorses paranoid thoughts about people here may hurt her and also spoke about people in [facility] were trying to do different things with her. Pt noted eating better this am, and cooperative with interview. Denies any SI/HI/hallucination.

## 2021-10-17 PROCEDURE — 99231 SBSQ HOSP IP/OBS SF/LOW 25: CPT

## 2021-10-17 RX ADMIN — Medication 200 MILLIGRAM(S): at 21:08

## 2021-10-17 RX ADMIN — CLOPIDOGREL BISULFATE 75 MILLIGRAM(S): 75 TABLET, FILM COATED ORAL at 09:56

## 2021-10-17 RX ADMIN — CARBIDOPA AND LEVODOPA 1 TABLET(S): 25; 100 TABLET ORAL at 13:44

## 2021-10-17 RX ADMIN — Medication 2 MILLIGRAM(S): at 09:55

## 2021-10-17 RX ADMIN — FLUPHENAZINE HYDROCHLORIDE 2.5 MILLIGRAM(S): 1 TABLET, FILM COATED ORAL at 21:09

## 2021-10-17 RX ADMIN — GABAPENTIN 100 MILLIGRAM(S): 400 CAPSULE ORAL at 13:44

## 2021-10-17 RX ADMIN — Medication 50 MILLIGRAM(S): at 21:09

## 2021-10-17 RX ADMIN — Medication 2 MILLIGRAM(S): at 21:10

## 2021-10-17 RX ADMIN — LISINOPRIL 10 MILLIGRAM(S): 2.5 TABLET ORAL at 10:08

## 2021-10-17 RX ADMIN — Medication 25 MILLIGRAM(S): at 21:10

## 2021-10-17 RX ADMIN — GABAPENTIN 100 MILLIGRAM(S): 400 CAPSULE ORAL at 21:09

## 2021-10-17 RX ADMIN — Medication 200 MILLIGRAM(S): at 10:09

## 2021-10-17 RX ADMIN — Medication 25 MILLIGRAM(S): at 10:09

## 2021-10-17 RX ADMIN — DIVALPROEX SODIUM 750 MILLIGRAM(S): 500 TABLET, DELAYED RELEASE ORAL at 21:08

## 2021-10-17 RX ADMIN — LATANOPROST 1 DROP(S): 0.05 SOLUTION/ DROPS OPHTHALMIC; TOPICAL at 22:38

## 2021-10-17 RX ADMIN — Medication 75 MICROGRAM(S): at 10:08

## 2021-10-17 RX ADMIN — Medication 1 TABLET(S): at 10:09

## 2021-10-17 RX ADMIN — FLUPHENAZINE HYDROCHLORIDE 2.5 MILLIGRAM(S): 1 TABLET, FILM COATED ORAL at 09:55

## 2021-10-17 RX ADMIN — SENNA PLUS 2 TABLET(S): 8.6 TABLET ORAL at 21:08

## 2021-10-17 RX ADMIN — CARBIDOPA AND LEVODOPA 1 TABLET(S): 25; 100 TABLET ORAL at 21:09

## 2021-10-17 RX ADMIN — METFORMIN HYDROCHLORIDE 500 MILLIGRAM(S): 850 TABLET ORAL at 10:08

## 2021-10-17 RX ADMIN — GABAPENTIN 100 MILLIGRAM(S): 400 CAPSULE ORAL at 09:56

## 2021-10-17 RX ADMIN — CARBIDOPA AND LEVODOPA 1 TABLET(S): 25; 100 TABLET ORAL at 09:56

## 2021-10-17 NOTE — BH INPATIENT PSYCHIATRY PROGRESS NOTE - NSBHCHARTREVIEWVS_PSY_A_CORE FT
Vital Signs Last 24 Hrs  T(C): 36.1 (10-17-21 @ 05:05), Max: 36.1 (10-17-21 @ 05:05)  T(F): 97 (10-17-21 @ 05:05), Max: 97 (10-17-21 @ 05:05)  HR: 74 (10-16-21 @ 20:45) (74 - 74)  BP: 154/91 (10-16-21 @ 20:45) (154/91 - 154/91)  BP(mean): --  RR: 18 (10-17-21 @ 05:05) (17 - 18)  SpO2: --    Orthostatic VS  10-17-21 @ 05:05  Lying BP: 149/82 HR: 63  Sitting BP: 145/79 HR: 67  Standing BP: --/-- HR: --  Site: upper left arm  Mode: electronic  Orthostatic VS  10-16-21 @ 08:29  Lying BP: --/-- HR: --  Sitting BP: 123/88 HR: 81  Standing BP: 109/82 HR: 96  Site: upper left arm  Mode: electronic  Orthostatic VS  10-15-21 @ 20:36  Lying BP: --/-- HR: --  Sitting BP: 144/81 HR: 72  Standing BP: --/-- HR: --  Site: --  Mode: --

## 2021-10-17 NOTE — BH INPATIENT PSYCHIATRY PROGRESS NOTE - NSBHFUPINTERVALHXFT_PSY_A_CORE
Patient remains paranoid, guarded, insisting that things "happened" at [facility], not agitated

## 2021-10-17 NOTE — BH INPATIENT PSYCHIATRY PROGRESS NOTE - NSBHASSESSSUMMFT_PSY_ALL_CORE
10/15  Patient with hx schizophrenia residing in SNF admitted for paranoia agitation in setting of medication disruption for unclear reason. Patient here presents as disorganized, some disorientation, irritable, suspicious. Patient supposed does well on Prolixin 5mg bid. Patient likely to have med induced EPS, not Parkinson's Disease. Often Sinemet is not good choice for EPS but difficult to assess for tapering without more collateral.  Will leave Prolixin at 2.5mg bid with plan to taper. Explore possibility of using atypical ( was dced on Abilify last time here ) and then trying to taper Sinemet and or Cogentin. Will cont CO during day til shows less disorganization., Requested PT and Ob Gyn consult. Added trazodone prn qhs insomnia to avoid BDZ.   10/16: persecutory delusional, disorganized and irritable requiring PRN med. No SI/HI, will continue CO, prolixin trial.  10/17: pt remains  guarded and paranoid, agitated at times, continue fluphenazine tx

## 2021-10-17 NOTE — BH INPATIENT PSYCHIATRY PROGRESS NOTE - NSBHMETABOLIC_PSY_ALL_CORE_FT
BMI:   HbA1c: A1C with Estimated Average Glucose Result: 5.6 % (10-15-21 @ 11:00)    Glucose: POCT Blood Glucose.: 71 mg/dL (10-14-21 @ 08:09)    BP: 154/91 (10-16-21 @ 20:45) (154/91 - 154/91)  Lipid Panel: Date/Time: 10-14-21 @ 09:38  Cholesterol, Serum: 159  Direct LDL: --  HDL Cholesterol, Serum: 66  Total Cholesterol/HDL Ration Measurement: --  Triglycerides, Serum: 100

## 2021-10-18 LAB
SARS-COV-2 RNA SPEC QL NAA+PROBE: SIGNIFICANT CHANGE UP
VALPROATE SERPL-MCNC: 64.3 UG/ML — SIGNIFICANT CHANGE UP (ref 50–100)

## 2021-10-18 PROCEDURE — 99231 SBSQ HOSP IP/OBS SF/LOW 25: CPT

## 2021-10-18 RX ORDER — LACTULOSE 10 G/15ML
10 SOLUTION ORAL AT BEDTIME
Refills: 0 | Status: DISCONTINUED | OUTPATIENT
Start: 2021-10-18 | End: 2021-11-20

## 2021-10-18 RX ORDER — FLUPHENAZINE HYDROCHLORIDE 1 MG/1
2.5 TABLET, FILM COATED ORAL AT BEDTIME
Refills: 0 | Status: COMPLETED | OUTPATIENT
Start: 2021-10-18 | End: 2021-10-20

## 2021-10-18 RX ORDER — PHENYLEPHRINE-SHARK LIVER OIL-MINERAL OIL-PETROLATUM RECTAL OINTMENT
1 OINTMENT (GRAM) RECTAL EVERY 6 HOURS
Refills: 0 | Status: DISCONTINUED | OUTPATIENT
Start: 2021-10-18 | End: 2021-11-20

## 2021-10-18 RX ORDER — ARIPIPRAZOLE 15 MG/1
2 TABLET ORAL DAILY
Refills: 0 | Status: DISCONTINUED | OUTPATIENT
Start: 2021-10-18 | End: 2021-10-21

## 2021-10-18 RX ADMIN — LATANOPROST 1 DROP(S): 0.05 SOLUTION/ DROPS OPHTHALMIC; TOPICAL at 20:24

## 2021-10-18 RX ADMIN — Medication 25 MILLIGRAM(S): at 09:18

## 2021-10-18 RX ADMIN — CARBIDOPA AND LEVODOPA 1 TABLET(S): 25; 100 TABLET ORAL at 20:22

## 2021-10-18 RX ADMIN — CARBIDOPA AND LEVODOPA 1 TABLET(S): 25; 100 TABLET ORAL at 14:15

## 2021-10-18 RX ADMIN — GABAPENTIN 100 MILLIGRAM(S): 400 CAPSULE ORAL at 09:18

## 2021-10-18 RX ADMIN — DIVALPROEX SODIUM 750 MILLIGRAM(S): 500 TABLET, DELAYED RELEASE ORAL at 20:23

## 2021-10-18 RX ADMIN — FLUPHENAZINE HYDROCHLORIDE 2.5 MILLIGRAM(S): 1 TABLET, FILM COATED ORAL at 09:20

## 2021-10-18 RX ADMIN — Medication 25 MILLIGRAM(S): at 20:23

## 2021-10-18 RX ADMIN — FLUPHENAZINE HYDROCHLORIDE 2.5 MILLIGRAM(S): 1 TABLET, FILM COATED ORAL at 20:22

## 2021-10-18 RX ADMIN — GABAPENTIN 100 MILLIGRAM(S): 400 CAPSULE ORAL at 20:23

## 2021-10-18 RX ADMIN — Medication 75 MICROGRAM(S): at 09:19

## 2021-10-18 RX ADMIN — Medication 200 MILLIGRAM(S): at 20:24

## 2021-10-18 RX ADMIN — Medication 50 MILLIGRAM(S): at 20:23

## 2021-10-18 RX ADMIN — LISINOPRIL 10 MILLIGRAM(S): 2.5 TABLET ORAL at 09:19

## 2021-10-18 RX ADMIN — CLOPIDOGREL BISULFATE 75 MILLIGRAM(S): 75 TABLET, FILM COATED ORAL at 09:18

## 2021-10-18 RX ADMIN — SENNA PLUS 2 TABLET(S): 8.6 TABLET ORAL at 20:23

## 2021-10-18 RX ADMIN — Medication 2 MILLIGRAM(S): at 20:23

## 2021-10-18 RX ADMIN — Medication 1 TABLET(S): at 09:19

## 2021-10-18 RX ADMIN — CARBIDOPA AND LEVODOPA 1 TABLET(S): 25; 100 TABLET ORAL at 09:18

## 2021-10-18 RX ADMIN — GABAPENTIN 100 MILLIGRAM(S): 400 CAPSULE ORAL at 14:15

## 2021-10-18 RX ADMIN — METFORMIN HYDROCHLORIDE 500 MILLIGRAM(S): 850 TABLET ORAL at 09:18

## 2021-10-18 RX ADMIN — Medication 200 MILLIGRAM(S): at 09:19

## 2021-10-18 RX ADMIN — POLYETHYLENE GLYCOL 3350 8.5 GRAM(S): 17 POWDER, FOR SOLUTION ORAL at 09:12

## 2021-10-18 RX ADMIN — Medication 2 MILLIGRAM(S): at 09:19

## 2021-10-18 NOTE — BH INPATIENT PSYCHIATRY PROGRESS NOTE - NSBHFUPINTERVALHXFT_PSY_A_CORE
Chart reviewed and case discussed with treatment team.  No events reported over the weekend. Sleep and appetite is fair. Patient is calmer but is still paranoid and easily agitated. She denies any SI/HI and AVH but appears internally preoccupied. Patient complaining of hemorrhoid discomfort, PRN ointment ordered. Patient is compliant with medications, no adverse effects reported.

## 2021-10-18 NOTE — BH INPATIENT PSYCHIATRY PROGRESS NOTE - NSBHASSESSSUMMFT_PSY_ALL_CORE
Pt is a 69 y/o AAF, domiciled at [facility], long hx of Schizophrenia with multiple past inpatient hospitalization, last hospitalization as per psyckes 5/2018 at [facility], past hx of aggression towards staff and peers, no documented hx of SA, pertinent PMH includes HLD, HTN, Parkinson's dx. Seizure d/o, T2DM, Hypothyroidism, Glaucoma, PVD, Asthma, Bladder Ca, BIB EMS activated by sister for agitation and worsening hallucination. Pt was medically seen found to have a UTI  exam, she was medically cleared and psych was consulted for psychosis.    On exam today, patient remains paranoid and delusional but was calmer. Plan is to taper Prolixin and start Abilify 2.5mg   Explore possibility of tapering Sinemet and or Cogentin. Will cont CO during day until patient shows less disorganization.  Requested PT and Ob Gyn consult. Added trazodone prn qhs insomnia to avoid BDZ.   Added Preparation H ointment and Lactulose as requested.

## 2021-10-18 NOTE — BH INPATIENT PSYCHIATRY PROGRESS NOTE - NSBHCHARTREVIEWVS_PSY_A_CORE FT
Vital Signs Last 24 Hrs  T(C): 36.2 (10-18-21 @ 08:22), Max: 36.7 (10-17-21 @ 15:33)  T(F): 97.1 (10-18-21 @ 08:22), Max: 98 (10-17-21 @ 15:33)  HR: 79 (10-17-21 @ 20:41) (79 - 79)  BP: 160/95 (10-17-21 @ 20:41) (160/95 - 160/95)  BP(mean): --  RR: 18 (10-17-21 @ 20:41) (18 - 18)  SpO2: --    Orthostatic VS  10-18-21 @ 08:22  Lying BP: 142/63 HR: 66  Sitting BP: --/-- HR: --  Standing BP: --/-- HR: --  Site: --  Mode: --  Orthostatic VS  10-17-21 @ 05:05  Lying BP: 149/82 HR: 63  Sitting BP: 145/79 HR: 67  Standing BP: --/-- HR: --  Site: upper left arm  Mode: electronic

## 2021-10-18 NOTE — BH INPATIENT PSYCHIATRY PROGRESS NOTE - NSBHMETABOLIC_PSY_ALL_CORE_FT
BMI:   HbA1c: A1C with Estimated Average Glucose Result: 5.6 % (10-15-21 @ 11:00)    Glucose: POCT Blood Glucose.: 71 mg/dL (10-14-21 @ 08:09)    BP: 160/95 (10-17-21 @ 20:41) (154/91 - 160/95)  Lipid Panel: Date/Time: 10-14-21 @ 09:38  Cholesterol, Serum: 159  Direct LDL: --  HDL Cholesterol, Serum: 66  Total Cholesterol/HDL Ration Measurement: --  Triglycerides, Serum: 100

## 2021-10-19 PROCEDURE — 99231 SBSQ HOSP IP/OBS SF/LOW 25: CPT

## 2021-10-19 RX ORDER — OLANZAPINE 15 MG/1
2.5 TABLET, FILM COATED ORAL ONCE
Refills: 0 | Status: DISCONTINUED | OUTPATIENT
Start: 2021-10-19 | End: 2021-11-20

## 2021-10-19 RX ORDER — OLANZAPINE 15 MG/1
2.5 TABLET, FILM COATED ORAL ONCE
Refills: 0 | Status: COMPLETED | OUTPATIENT
Start: 2021-10-19 | End: 2021-10-19

## 2021-10-19 RX ADMIN — OLANZAPINE 2.5 MILLIGRAM(S): 15 TABLET, FILM COATED ORAL at 10:38

## 2021-10-19 RX ADMIN — GABAPENTIN 100 MILLIGRAM(S): 400 CAPSULE ORAL at 12:47

## 2021-10-19 RX ADMIN — Medication 1 TABLET(S): at 09:13

## 2021-10-19 RX ADMIN — Medication 2 MILLIGRAM(S): at 21:27

## 2021-10-19 RX ADMIN — Medication 25 MILLIGRAM(S): at 09:13

## 2021-10-19 RX ADMIN — FLUPHENAZINE HYDROCHLORIDE 2.5 MILLIGRAM(S): 1 TABLET, FILM COATED ORAL at 21:29

## 2021-10-19 RX ADMIN — CARBIDOPA AND LEVODOPA 1 TABLET(S): 25; 100 TABLET ORAL at 21:27

## 2021-10-19 RX ADMIN — Medication 75 MICROGRAM(S): at 06:52

## 2021-10-19 RX ADMIN — CLOPIDOGREL BISULFATE 75 MILLIGRAM(S): 75 TABLET, FILM COATED ORAL at 09:14

## 2021-10-19 RX ADMIN — POLYETHYLENE GLYCOL 3350 8.5 GRAM(S): 17 POWDER, FOR SOLUTION ORAL at 09:14

## 2021-10-19 RX ADMIN — GABAPENTIN 100 MILLIGRAM(S): 400 CAPSULE ORAL at 21:28

## 2021-10-19 RX ADMIN — Medication 2 MILLIGRAM(S): at 09:14

## 2021-10-19 RX ADMIN — METFORMIN HYDROCHLORIDE 500 MILLIGRAM(S): 850 TABLET ORAL at 09:14

## 2021-10-19 RX ADMIN — DIVALPROEX SODIUM 750 MILLIGRAM(S): 500 TABLET, DELAYED RELEASE ORAL at 21:29

## 2021-10-19 RX ADMIN — GABAPENTIN 100 MILLIGRAM(S): 400 CAPSULE ORAL at 09:13

## 2021-10-19 RX ADMIN — Medication 50 MILLIGRAM(S): at 21:28

## 2021-10-19 RX ADMIN — ARIPIPRAZOLE 2 MILLIGRAM(S): 15 TABLET ORAL at 09:14

## 2021-10-19 RX ADMIN — SENNA PLUS 2 TABLET(S): 8.6 TABLET ORAL at 21:28

## 2021-10-19 RX ADMIN — CARBIDOPA AND LEVODOPA 1 TABLET(S): 25; 100 TABLET ORAL at 09:14

## 2021-10-19 RX ADMIN — CARBIDOPA AND LEVODOPA 1 TABLET(S): 25; 100 TABLET ORAL at 12:47

## 2021-10-19 RX ADMIN — LISINOPRIL 10 MILLIGRAM(S): 2.5 TABLET ORAL at 09:13

## 2021-10-19 RX ADMIN — LATANOPROST 1 DROP(S): 0.05 SOLUTION/ DROPS OPHTHALMIC; TOPICAL at 21:31

## 2021-10-19 RX ADMIN — Medication 200 MILLIGRAM(S): at 21:27

## 2021-10-19 RX ADMIN — Medication 25 MILLIGRAM(S): at 21:29

## 2021-10-19 NOTE — BH INPATIENT PSYCHIATRY PROGRESS NOTE - NSBHMETABOLIC_PSY_ALL_CORE_FT
BMI:   HbA1c: A1C with Estimated Average Glucose Result: 5.6 % (10-15-21 @ 11:00)    Glucose: POCT Blood Glucose.: 61 mg/dL (10-19-21 @ 11:59)    BP: 160/95 (10-17-21 @ 20:41) (154/91 - 160/95)  Lipid Panel: Date/Time: 10-14-21 @ 09:38  Cholesterol, Serum: 159  Direct LDL: --  HDL Cholesterol, Serum: 66  Total Cholesterol/HDL Ration Measurement: --  Triglycerides, Serum: 100

## 2021-10-19 NOTE — BH INPATIENT PSYCHIATRY PROGRESS NOTE - NSBHASSESSSUMMFT_PSY_ALL_CORE
Pt is a 69 y/o AAF, domiciled at [facility], long hx of Schizophrenia with multiple past inpatient hospitalization, last hospitalization as per psyckes 5/2018 at [facility], past hx of aggression towards staff and peers, no documented hx of SA, pertinent PMH includes HLD, HTN, Parkinson's dx. Seizure d/o, T2DM, Hypothyroidism, Glaucoma, PVD, Asthma, Bladder Ca, BIB EMS activated by sister for agitation and worsening hallucination. Pt was medically seen found to have a UTI  exam, she was medically cleared and psych was consulted for psychosis.    On exam today, patient remains paranoid and delusional but was initially calmer. Patient given Zyprexa 2.5mg IM for being combative and violent.    Plan is to taper Prolixin and start Abilify 2.5mg   Explore possibility of tapering Sinemet and or Cogentin. Will cont CO during day until patient shows less disorganization.  Requested PT and Ob Gyn consult. Added trazodone prn qhs insomnia to avoid BDZ.   Added Preparation H ointment and Lactulose as requested.

## 2021-10-19 NOTE — BH INPATIENT PSYCHIATRY PROGRESS NOTE - NSBHCHARTREVIEWVS_PSY_A_CORE FT
Vital Signs Last 24 Hrs  T(C): 36.3 (10-19-21 @ 08:22), Max: 37.3 (10-18-21 @ 15:33)  T(F): 97.4 (10-19-21 @ 08:22), Max: 99.1 (10-18-21 @ 15:33)  HR: --  BP: --  BP(mean): --  RR: --  SpO2: --    Orthostatic VS  10-19-21 @ 08:22  Lying BP: --/-- HR: --  Sitting BP: 158/82 HR: 76  Standing BP: 147/87 HR: 81  Site: --  Mode: --  Orthostatic VS  10-18-21 @ 20:59  Lying BP: --/-- HR: --  Sitting BP: 124/82 HR: 81  Standing BP: --/-- HR: --  Site: upper right arm  Mode: electronic  Orthostatic VS  10-18-21 @ 08:22  Lying BP: 142/63 HR: 66  Sitting BP: --/-- HR: --  Standing BP: --/-- HR: --  Site: --  Mode: --

## 2021-10-19 NOTE — BH INPATIENT PSYCHIATRY PROGRESS NOTE - NSBHFUPINTERVALHXFT_PSY_A_CORE
Chart reviewed and case discussed with treatment team.  No events reported overnight. Sleep and appetite is fair. Patient was calmer and pleasant during interview, but then became combative with staff. She was given Zyprexa IM with good effect. She remains very disorganized, paranoid and is responding to internal stimuli. Patient is compliant with medications, no adverse effects reported.

## 2021-10-19 NOTE — BH INPATIENT PSYCHIATRY PROGRESS NOTE - CURRENT MEDICATION
MEDICATIONS  (STANDING):  ARIPiprazole 2 milliGRAM(s) Oral daily  benztropine 2 milliGRAM(s) Oral two times a day  carbidopa/levodopa  25/100 1 Tablet(s) Oral three times a day  cefpodoxime 200 milliGRAM(s) Oral every 12 hours  clopidogrel Tablet 75 milliGRAM(s) Oral daily  diVALproex Sprinkle 750 milliGRAM(s) Oral at bedtime  fluPHENAZine 2.5 milliGRAM(s) Oral at bedtime  gabapentin 100 milliGRAM(s) Oral three times a day  latanoprost 0.005% Ophthalmic Solution 1 Drop(s) Both EYES at bedtime  levothyroxine 75 MICROGram(s) Oral <User Schedule>  lisinopril 10 milliGRAM(s) Oral daily  metFORMIN 500 milliGRAM(s) Oral daily  metoprolol tartrate 25 milliGRAM(s) Oral two times a day  multivitamin/minerals 1 Tablet(s) Oral daily  polyethylene glycol 3350 8.5 Gram(s) Oral daily  senna 2 Tablet(s) Oral at bedtime  traZODone 50 milliGRAM(s) Oral at bedtime    MEDICATIONS  (PRN):  hemorrhoidal Ointment 1 Application(s) Rectal every 6 hours PRN hemorrhoid discomfort  lactulose Syrup 10 Gram(s) Oral at bedtime PRN constipation  OLANZapine Injectable 2.5 milliGRAM(s) IntraMuscular once PRN severe agitation  traZODone 25 milliGRAM(s) Oral at bedtime PRN insomnia

## 2021-10-20 PROCEDURE — 99231 SBSQ HOSP IP/OBS SF/LOW 25: CPT

## 2021-10-20 RX ORDER — SOD,AMMONIUM,POTASSIUM LACTATE
1 CREAM (GRAM) TOPICAL
Refills: 0 | Status: DISCONTINUED | OUTPATIENT
Start: 2021-10-20 | End: 2021-11-20

## 2021-10-20 RX ADMIN — Medication 75 MICROGRAM(S): at 06:34

## 2021-10-20 RX ADMIN — Medication 200 MILLIGRAM(S): at 20:41

## 2021-10-20 RX ADMIN — DIVALPROEX SODIUM 750 MILLIGRAM(S): 500 TABLET, DELAYED RELEASE ORAL at 20:40

## 2021-10-20 RX ADMIN — Medication 25 MILLIGRAM(S): at 20:41

## 2021-10-20 RX ADMIN — CLOPIDOGREL BISULFATE 75 MILLIGRAM(S): 75 TABLET, FILM COATED ORAL at 09:02

## 2021-10-20 RX ADMIN — METFORMIN HYDROCHLORIDE 500 MILLIGRAM(S): 850 TABLET ORAL at 09:02

## 2021-10-20 RX ADMIN — LATANOPROST 1 DROP(S): 0.05 SOLUTION/ DROPS OPHTHALMIC; TOPICAL at 20:42

## 2021-10-20 RX ADMIN — Medication 200 MILLIGRAM(S): at 09:03

## 2021-10-20 RX ADMIN — Medication 50 MILLIGRAM(S): at 20:41

## 2021-10-20 RX ADMIN — CARBIDOPA AND LEVODOPA 1 TABLET(S): 25; 100 TABLET ORAL at 15:03

## 2021-10-20 RX ADMIN — Medication 1 TABLET(S): at 09:02

## 2021-10-20 RX ADMIN — ARIPIPRAZOLE 2 MILLIGRAM(S): 15 TABLET ORAL at 09:02

## 2021-10-20 RX ADMIN — FLUPHENAZINE HYDROCHLORIDE 2.5 MILLIGRAM(S): 1 TABLET, FILM COATED ORAL at 20:41

## 2021-10-20 RX ADMIN — CARBIDOPA AND LEVODOPA 1 TABLET(S): 25; 100 TABLET ORAL at 09:02

## 2021-10-20 RX ADMIN — Medication 25 MILLIGRAM(S): at 09:02

## 2021-10-20 RX ADMIN — LISINOPRIL 10 MILLIGRAM(S): 2.5 TABLET ORAL at 09:02

## 2021-10-20 RX ADMIN — Medication 2 MILLIGRAM(S): at 20:42

## 2021-10-20 RX ADMIN — GABAPENTIN 100 MILLIGRAM(S): 400 CAPSULE ORAL at 09:01

## 2021-10-20 RX ADMIN — SENNA PLUS 2 TABLET(S): 8.6 TABLET ORAL at 20:39

## 2021-10-20 RX ADMIN — CARBIDOPA AND LEVODOPA 1 TABLET(S): 25; 100 TABLET ORAL at 20:39

## 2021-10-20 RX ADMIN — GABAPENTIN 100 MILLIGRAM(S): 400 CAPSULE ORAL at 15:02

## 2021-10-20 RX ADMIN — GABAPENTIN 100 MILLIGRAM(S): 400 CAPSULE ORAL at 20:39

## 2021-10-20 RX ADMIN — Medication 2 MILLIGRAM(S): at 09:02

## 2021-10-20 NOTE — BH INPATIENT PSYCHIATRY PROGRESS NOTE - NSBHFUPINTERVALHXFT_PSY_A_CORE
Chart reviewed and case discussed with treatment team.  No events reported overnight. Sleep and appetite is fair. Patient remains very disorganized, paranoid and is responding to internal stimuli.  She is labile, easily agitated. She was calmer during interview and no combative behaviors reported this morning. Patient complaining of dry, cracked skin on her feet, Lac-hydrin ordered. CO maintained for safety. Patient is compliant with medications, no adverse effects reported.

## 2021-10-20 NOTE — BH INPATIENT PSYCHIATRY PROGRESS NOTE - NSBHASSESSSUMMFT_PSY_ALL_CORE
Pt is a 71 y/o AAF, domiciled at [facility], long hx of Schizophrenia with multiple past inpatient hospitalization, last hospitalization as per psyckes 5/2018 at [facility], past hx of aggression towards staff and peers, no documented hx of SA, pertinent PMH includes HLD, HTN, Parkinson's dx. Seizure d/o, T2DM, Hypothyroidism, Glaucoma, PVD, Asthma, Bladder Ca, BIB EMS activated by sister for agitation and worsening hallucination. Pt was medically seen found to have a UTI  exam, she was medically cleared and psych was consulted for psychosis.    On exam today, patient remains paranoid and delusional but was calmer during interview.    Plan is to taper Prolixin and titrate Abilify 2.5mg   Explore possibility of tapering Sinemet and or Cogentin. Will cont CO during day until patient shows less disorganization.  Requested PT and Nutritionist consult. Added trazodone prn qhs insomnia to avoid BDZ.   Added Preparation H ointment and Lactulose as requested.   OB Gyn appointment for 10/21

## 2021-10-20 NOTE — BH INPATIENT PSYCHIATRY PROGRESS NOTE - NSBHMETABOLIC_PSY_ALL_CORE_FT
BMI:   HbA1c: A1C with Estimated Average Glucose Result: 5.6 % (10-15-21 @ 11:00)    Glucose: POCT Blood Glucose.: 61 mg/dL (10-19-21 @ 11:59)    BP: 160/95 (10-17-21 @ 20:41) (160/95 - 160/95)  Lipid Panel: Date/Time: 10-14-21 @ 09:38  Cholesterol, Serum: 159  Direct LDL: --  HDL Cholesterol, Serum: 66  Total Cholesterol/HDL Ration Measurement: --  Triglycerides, Serum: 100

## 2021-10-20 NOTE — BH INPATIENT PSYCHIATRY PROGRESS NOTE - NSBHCHARTREVIEWVS_PSY_A_CORE FT
Vital Signs Last 24 Hrs  T(C): 36.8 (10-20-21 @ 07:18), Max: 36.8 (10-19-21 @ 16:00)  T(F): 98.2 (10-20-21 @ 07:18), Max: 98.2 (10-19-21 @ 16:00)  HR: --  BP: --  BP(mean): --  RR: --  SpO2: --    Orthostatic VS  10-20-21 @ 07:18  Lying BP: --/-- HR: --  Sitting BP: 128/91 HR: 95  Standing BP: 144/99 HR: 107  Site: --  Mode: --  Orthostatic VS  10-19-21 @ 20:33  Lying BP: --/-- HR: --  Sitting BP: 171/85 HR: 80  Standing BP: --/-- HR: --  Site: --  Mode: --  Orthostatic VS  10-19-21 @ 08:22  Lying BP: --/-- HR: --  Sitting BP: 158/82 HR: 76  Standing BP: 147/87 HR: 81  Site: --  Mode: --  Orthostatic VS  10-18-21 @ 20:59  Lying BP: --/-- HR: --  Sitting BP: 124/82 HR: 81  Standing BP: --/-- HR: --  Site: upper right arm  Mode: electronic

## 2021-10-20 NOTE — BH INPATIENT PSYCHIATRY PROGRESS NOTE - CURRENT MEDICATION
MEDICATIONS  (STANDING):  ARIPiprazole 2 milliGRAM(s) Oral daily  benztropine 2 milliGRAM(s) Oral two times a day  carbidopa/levodopa  25/100 1 Tablet(s) Oral three times a day  cefpodoxime 200 milliGRAM(s) Oral every 12 hours  clopidogrel Tablet 75 milliGRAM(s) Oral daily  diVALproex Sprinkle 750 milliGRAM(s) Oral at bedtime  fluPHENAZine 2.5 milliGRAM(s) Oral at bedtime  gabapentin 100 milliGRAM(s) Oral three times a day  latanoprost 0.005% Ophthalmic Solution 1 Drop(s) Both EYES at bedtime  levothyroxine 75 MICROGram(s) Oral <User Schedule>  lisinopril 10 milliGRAM(s) Oral daily  metFORMIN 500 milliGRAM(s) Oral daily  metoprolol tartrate 25 milliGRAM(s) Oral two times a day  multivitamin/minerals 1 Tablet(s) Oral daily  polyethylene glycol 3350 8.5 Gram(s) Oral daily  senna 2 Tablet(s) Oral at bedtime  traZODone 50 milliGRAM(s) Oral at bedtime    MEDICATIONS  (PRN):  ammonium lactate 12% Lotion 1 Application(s) Topical two times a day PRN dry skin  hemorrhoidal Ointment 1 Application(s) Rectal every 6 hours PRN hemorrhoid discomfort  lactulose Syrup 10 Gram(s) Oral at bedtime PRN constipation  OLANZapine Injectable 2.5 milliGRAM(s) IntraMuscular once PRN severe agitation  traZODone 25 milliGRAM(s) Oral at bedtime PRN insomnia

## 2021-10-21 PROCEDURE — 99223 1ST HOSP IP/OBS HIGH 75: CPT

## 2021-10-21 PROCEDURE — 99232 SBSQ HOSP IP/OBS MODERATE 35: CPT

## 2021-10-21 RX ORDER — ARIPIPRAZOLE 15 MG/1
5 TABLET ORAL DAILY
Refills: 0 | Status: DISCONTINUED | OUTPATIENT
Start: 2021-10-21 | End: 2021-10-25

## 2021-10-21 RX ADMIN — CARBIDOPA AND LEVODOPA 1 TABLET(S): 25; 100 TABLET ORAL at 21:52

## 2021-10-21 RX ADMIN — Medication 50 MILLIGRAM(S): at 21:52

## 2021-10-21 RX ADMIN — DIVALPROEX SODIUM 750 MILLIGRAM(S): 500 TABLET, DELAYED RELEASE ORAL at 21:52

## 2021-10-21 RX ADMIN — Medication 25 MILLIGRAM(S): at 21:52

## 2021-10-21 RX ADMIN — GABAPENTIN 100 MILLIGRAM(S): 400 CAPSULE ORAL at 13:27

## 2021-10-21 RX ADMIN — LATANOPROST 1 DROP(S): 0.05 SOLUTION/ DROPS OPHTHALMIC; TOPICAL at 21:58

## 2021-10-21 RX ADMIN — Medication 2 MILLIGRAM(S): at 09:13

## 2021-10-21 RX ADMIN — LISINOPRIL 10 MILLIGRAM(S): 2.5 TABLET ORAL at 09:36

## 2021-10-21 RX ADMIN — Medication 25 MILLIGRAM(S): at 09:36

## 2021-10-21 RX ADMIN — METFORMIN HYDROCHLORIDE 500 MILLIGRAM(S): 850 TABLET ORAL at 09:15

## 2021-10-21 RX ADMIN — Medication 1 APPLICATION(S): at 06:41

## 2021-10-21 RX ADMIN — Medication 75 MICROGRAM(S): at 06:10

## 2021-10-21 RX ADMIN — CARBIDOPA AND LEVODOPA 1 TABLET(S): 25; 100 TABLET ORAL at 09:36

## 2021-10-21 RX ADMIN — CLOPIDOGREL BISULFATE 75 MILLIGRAM(S): 75 TABLET, FILM COATED ORAL at 09:14

## 2021-10-21 RX ADMIN — SENNA PLUS 2 TABLET(S): 8.6 TABLET ORAL at 21:52

## 2021-10-21 RX ADMIN — ARIPIPRAZOLE 2 MILLIGRAM(S): 15 TABLET ORAL at 09:13

## 2021-10-21 RX ADMIN — Medication 2 MILLIGRAM(S): at 21:52

## 2021-10-21 RX ADMIN — Medication 0.5 MILLIGRAM(S): at 22:10

## 2021-10-21 RX ADMIN — GABAPENTIN 100 MILLIGRAM(S): 400 CAPSULE ORAL at 09:14

## 2021-10-21 RX ADMIN — GABAPENTIN 100 MILLIGRAM(S): 400 CAPSULE ORAL at 21:52

## 2021-10-21 RX ADMIN — CARBIDOPA AND LEVODOPA 1 TABLET(S): 25; 100 TABLET ORAL at 13:26

## 2021-10-21 NOTE — BH INPATIENT PSYCHIATRY PROGRESS NOTE - NSBHFUPINTERVALHXFT_PSY_A_CORE
Chart reviewed and case discussed with treatment team.  No events reported overnight. Sleep and appetite is fair. Patient remains very disorganized, paranoid, religiously preoccupied, and is responding to internal stimuli.  She is labile, easily agitated. She was grossly psychotic during the interview. Today morning, patient took the bible and placed it on another patient's head in the day room. She was redirected by staff. CO maintained for safety. Patient is compliant with medications, no adverse effects reported. She was previously on Prolixin which was discontinued due to worsening Parkinson's symptoms. She is currently taking Abilify 2 mg daily, tolerating it well.     Gynecology consult was obtained for prolapsed uterus.

## 2021-10-21 NOTE — BH INPATIENT PSYCHIATRY PROGRESS NOTE - NSBHCHARTREVIEWVS_PSY_A_CORE FT
Vital Signs Last 24 Hrs  T(C): 36.1 (10-21-21 @ 07:59), Max: 37.2 (10-20-21 @ 15:29)  T(F): 96.9 (10-21-21 @ 07:59), Max: 98.9 (10-20-21 @ 15:29)  HR: 70 (10-20-21 @ 20:09) (70 - 70)  BP: 157/71 (10-20-21 @ 20:09) (157/71 - 157/71)  BP(mean): --  RR: 17 (10-20-21 @ 20:09) (17 - 17)  SpO2: --    Orthostatic VS  10-21-21 @ 07:59  Lying BP: --/-- HR: --  Sitting BP: 127/74 HR: 80  Standing BP: --/-- HR: --  Site: --  Mode: --  Orthostatic VS  10-20-21 @ 07:18  Lying BP: --/-- HR: --  Sitting BP: 128/91 HR: 95  Standing BP: 144/99 HR: 107  Site: --  Mode: --  Orthostatic VS  10-19-21 @ 20:33  Lying BP: --/-- HR: --  Sitting BP: 171/85 HR: 80  Standing BP: --/-- HR: --  Site: --  Mode: --

## 2021-10-21 NOTE — BH INPATIENT PSYCHIATRY PROGRESS NOTE - NSBHASSESSSUMMFT_PSY_ALL_CORE
Pt is a 71 y/o AAF, domiciled at [facility], long hx of Schizophrenia with multiple past inpatient hospitalization, last hospitalization as per psyckes 5/2018 at [facility], past hx of aggression towards staff and peers, no documented hx of SA, pertinent PMH includes HLD, HTN, Parkinson's dx. Seizure d/o, T2DM, Hypothyroidism, Glaucoma, PVD, Asthma, Bladder Ca, BIB EMS activated by sister for agitation and worsening hallucination. Pt was medically seen found to have a UTI  exam, she was medically cleared and psych was consulted for psychosis.    On exam today, patient remains paranoid and delusional but was calmer during interview.    Plan is to taper Prolixin and titrate Abilify 2.5mg   Explore possibility of tapering Sinemet and or Cogentin. Will cont CO during day until patient shows less disorganization.  Requested PT and Nutritionist consult. Added trazodone prn qhs insomnia to avoid BDZ.   Added Preparation H ointment and Lactulose as requested.   OB Gyn appointment for 10/21

## 2021-10-21 NOTE — BH INPATIENT PSYCHIATRY PROGRESS NOTE - CURRENT MEDICATION
MEDICATIONS  (STANDING):  ARIPiprazole 2 milliGRAM(s) Oral daily  benztropine 2 milliGRAM(s) Oral two times a day  carbidopa/levodopa  25/100 1 Tablet(s) Oral three times a day  clopidogrel Tablet 75 milliGRAM(s) Oral daily  diVALproex Sprinkle 750 milliGRAM(s) Oral at bedtime  gabapentin 100 milliGRAM(s) Oral three times a day  latanoprost 0.005% Ophthalmic Solution 1 Drop(s) Both EYES at bedtime  levothyroxine 75 MICROGram(s) Oral <User Schedule>  lisinopril 10 milliGRAM(s) Oral daily  metFORMIN 500 milliGRAM(s) Oral daily  metoprolol tartrate 25 milliGRAM(s) Oral two times a day  multivitamin/minerals 1 Tablet(s) Oral daily  polyethylene glycol 3350 8.5 Gram(s) Oral daily  senna 2 Tablet(s) Oral at bedtime  traZODone 50 milliGRAM(s) Oral at bedtime    MEDICATIONS  (PRN):  ammonium lactate 12% Lotion 1 Application(s) Topical two times a day PRN dry skin  hemorrhoidal Ointment 1 Application(s) Rectal every 6 hours PRN hemorrhoid discomfort  lactulose Syrup 10 Gram(s) Oral at bedtime PRN constipation  OLANZapine Injectable 2.5 milliGRAM(s) IntraMuscular once PRN severe agitation  traZODone 25 milliGRAM(s) Oral at bedtime PRN insomnia

## 2021-10-21 NOTE — BH INPATIENT PSYCHIATRY PROGRESS NOTE - NSBHMETABOLIC_PSY_ALL_CORE_FT
BMI:   HbA1c: A1C with Estimated Average Glucose Result: 5.6 % (10-15-21 @ 11:00)    Glucose: POCT Blood Glucose.: 61 mg/dL (10-19-21 @ 11:59)    BP: 157/71 (10-20-21 @ 20:09) (157/71 - 157/71)  Lipid Panel: Date/Time: 10-14-21 @ 09:38  Cholesterol, Serum: 159  Direct LDL: --  HDL Cholesterol, Serum: 66  Total Cholesterol/HDL Ration Measurement: --  Triglycerides, Serum: 100

## 2021-10-22 DIAGNOSIS — N81.10 CYSTOCELE, UNSPECIFIED: ICD-10-CM

## 2021-10-22 PROCEDURE — 99232 SBSQ HOSP IP/OBS MODERATE 35: CPT

## 2021-10-22 RX ADMIN — Medication 25 MILLIGRAM(S): at 13:28

## 2021-10-22 RX ADMIN — GABAPENTIN 100 MILLIGRAM(S): 400 CAPSULE ORAL at 13:27

## 2021-10-22 RX ADMIN — Medication 25 MILLIGRAM(S): at 21:35

## 2021-10-22 RX ADMIN — LATANOPROST 1 DROP(S): 0.05 SOLUTION/ DROPS OPHTHALMIC; TOPICAL at 21:36

## 2021-10-22 RX ADMIN — DIVALPROEX SODIUM 750 MILLIGRAM(S): 500 TABLET, DELAYED RELEASE ORAL at 21:35

## 2021-10-22 RX ADMIN — Medication 75 MICROGRAM(S): at 06:47

## 2021-10-22 RX ADMIN — Medication 2 MILLIGRAM(S): at 21:35

## 2021-10-22 RX ADMIN — Medication 1 MILLIGRAM(S): at 02:55

## 2021-10-22 RX ADMIN — Medication 1 MILLIGRAM(S): at 20:04

## 2021-10-22 RX ADMIN — LISINOPRIL 10 MILLIGRAM(S): 2.5 TABLET ORAL at 13:27

## 2021-10-22 RX ADMIN — CLOPIDOGREL BISULFATE 75 MILLIGRAM(S): 75 TABLET, FILM COATED ORAL at 13:27

## 2021-10-22 RX ADMIN — CARBIDOPA AND LEVODOPA 1 TABLET(S): 25; 100 TABLET ORAL at 13:25

## 2021-10-22 RX ADMIN — GABAPENTIN 100 MILLIGRAM(S): 400 CAPSULE ORAL at 21:35

## 2021-10-22 RX ADMIN — CARBIDOPA AND LEVODOPA 1 TABLET(S): 25; 100 TABLET ORAL at 21:35

## 2021-10-22 RX ADMIN — ARIPIPRAZOLE 5 MILLIGRAM(S): 15 TABLET ORAL at 13:26

## 2021-10-22 RX ADMIN — METFORMIN HYDROCHLORIDE 500 MILLIGRAM(S): 850 TABLET ORAL at 13:28

## 2021-10-22 RX ADMIN — SENNA PLUS 2 TABLET(S): 8.6 TABLET ORAL at 21:34

## 2021-10-22 RX ADMIN — Medication 2 MILLIGRAM(S): at 13:27

## 2021-10-22 RX ADMIN — Medication 1 TABLET(S): at 13:27

## 2021-10-22 RX ADMIN — Medication 50 MILLIGRAM(S): at 21:35

## 2021-10-22 NOTE — BH INPATIENT PSYCHIATRY PROGRESS NOTE - CURRENT MEDICATION
MEDICATIONS  (STANDING):  ARIPiprazole 5 milliGRAM(s) Oral daily  benztropine 2 milliGRAM(s) Oral two times a day  carbidopa/levodopa  25/100 1 Tablet(s) Oral three times a day  clopidogrel Tablet 75 milliGRAM(s) Oral daily  diVALproex Sprinkle 750 milliGRAM(s) Oral at bedtime  gabapentin 100 milliGRAM(s) Oral three times a day  latanoprost 0.005% Ophthalmic Solution 1 Drop(s) Both EYES at bedtime  levothyroxine 75 MICROGram(s) Oral <User Schedule>  lisinopril 10 milliGRAM(s) Oral daily  metFORMIN 500 milliGRAM(s) Oral daily  metoprolol tartrate 25 milliGRAM(s) Oral two times a day  multivitamin/minerals 1 Tablet(s) Oral daily  polyethylene glycol 3350 8.5 Gram(s) Oral daily  senna 2 Tablet(s) Oral at bedtime  traZODone 50 milliGRAM(s) Oral at bedtime    MEDICATIONS  (PRN):  ammonium lactate 12% Lotion 1 Application(s) Topical two times a day PRN dry skin  hemorrhoidal Ointment 1 Application(s) Rectal every 6 hours PRN hemorrhoid discomfort  lactulose Syrup 10 Gram(s) Oral at bedtime PRN constipation  LORazepam     Tablet 1 milliGRAM(s) Oral every 6 hours PRN anxiety  OLANZapine Injectable 2.5 milliGRAM(s) IntraMuscular once PRN severe agitation  traZODone 25 milliGRAM(s) Oral at bedtime PRN insomnia

## 2021-10-22 NOTE — BH INPATIENT PSYCHIATRY PROGRESS NOTE - NSBHFUPINTERVALHXFT_PSY_A_CORE
Patient seen for follow up for psychosis. Chart reviewed and case discussed with treatment team.  Patient required ativan 2 mg PRN twice yesterday for agitation and paranoid behavior. On evaluation today, patient remained sedated from additional ativan given. Minimally cooperative with interview, only stating that she was tired. Denied SHIIP or AVH.

## 2021-10-22 NOTE — BH INPATIENT PSYCHIATRY PROGRESS NOTE - NSBHCHARTREVIEWVS_PSY_A_CORE FT
Vital Signs Last 24 Hrs  T(C): 36.4 (10-21-21 @ 20:26), Max: 36.9 (10-21-21 @ 15:42)  T(F): 97.5 (10-21-21 @ 20:26), Max: 98.4 (10-21-21 @ 15:42)  HR: --  BP: --  BP(mean): --  RR: --  SpO2: --    Orthostatic VS  10-21-21 @ 20:26  Lying BP: --/-- HR: --  Sitting BP: 145/104 HR: 70  Standing BP: --/-- HR: --  Site: --  Mode: --  Orthostatic VS  10-21-21 @ 07:59  Lying BP: --/-- HR: --  Sitting BP: 127/74 HR: 80  Standing BP: --/-- HR: --  Site: --  Mode: --

## 2021-10-22 NOTE — BH INPATIENT PSYCHIATRY PROGRESS NOTE - NSBHASSESSSUMMFT_PSY_ALL_CORE
Pt is a 69 y/o AAF, domiciled at [facility], long hx of Schizophrenia with multiple past inpatient hospitalization, last hospitalization as per psyckes 5/2018 at [facility], past hx of aggression towards staff and peers, no documented hx of SA, pertinent PMH includes HLD, HTN, Parkinson's dx. Seizure d/o, T2DM, Hypothyroidism, Glaucoma, PVD, Asthma, Bladder Ca, BIB EMS activated by sister for agitation and worsening hallucination. Pt was medically seen found to have a UTI  exam, she was medically cleared and psych was consulted for psychosis.    Patient required Ativan PRN twice over night for acute agitation. Sedated this morning and minimally cooperative with interview.     Will increase Ativan to TID. continue rest of management.

## 2021-10-23 DIAGNOSIS — G20 PARKINSON'S DISEASE: ICD-10-CM

## 2021-10-23 PROCEDURE — 99232 SBSQ HOSP IP/OBS MODERATE 35: CPT

## 2021-10-23 RX ORDER — CLOTRIMAZOLE AND BETAMETHASONE DIPROPIONATE 10; .5 MG/G; MG/G
1 CREAM TOPICAL
Refills: 0 | Status: DISCONTINUED | OUTPATIENT
Start: 2021-10-23 | End: 2021-11-20

## 2021-10-23 RX ORDER — BENZTROPINE MESYLATE 1 MG
1 TABLET ORAL
Refills: 0 | Status: DISCONTINUED | OUTPATIENT
Start: 2021-10-23 | End: 2021-11-20

## 2021-10-23 RX ADMIN — Medication 1 MILLIGRAM(S): at 20:56

## 2021-10-23 RX ADMIN — CARBIDOPA AND LEVODOPA 1 TABLET(S): 25; 100 TABLET ORAL at 20:57

## 2021-10-23 RX ADMIN — Medication 25 MILLIGRAM(S): at 09:27

## 2021-10-23 RX ADMIN — LISINOPRIL 10 MILLIGRAM(S): 2.5 TABLET ORAL at 09:27

## 2021-10-23 RX ADMIN — CLOTRIMAZOLE AND BETAMETHASONE DIPROPIONATE 1 APPLICATION(S): 10; .5 CREAM TOPICAL at 21:01

## 2021-10-23 RX ADMIN — GABAPENTIN 100 MILLIGRAM(S): 400 CAPSULE ORAL at 13:25

## 2021-10-23 RX ADMIN — LATANOPROST 1 DROP(S): 0.05 SOLUTION/ DROPS OPHTHALMIC; TOPICAL at 21:00

## 2021-10-23 RX ADMIN — GABAPENTIN 100 MILLIGRAM(S): 400 CAPSULE ORAL at 09:28

## 2021-10-23 RX ADMIN — Medication 1 TABLET(S): at 09:27

## 2021-10-23 RX ADMIN — Medication 25 MILLIGRAM(S): at 20:57

## 2021-10-23 RX ADMIN — Medication 50 MILLIGRAM(S): at 20:57

## 2021-10-23 RX ADMIN — CARBIDOPA AND LEVODOPA 1 TABLET(S): 25; 100 TABLET ORAL at 13:25

## 2021-10-23 RX ADMIN — ARIPIPRAZOLE 5 MILLIGRAM(S): 15 TABLET ORAL at 09:27

## 2021-10-23 RX ADMIN — METFORMIN HYDROCHLORIDE 500 MILLIGRAM(S): 850 TABLET ORAL at 09:27

## 2021-10-23 RX ADMIN — Medication 2 MILLIGRAM(S): at 09:27

## 2021-10-23 RX ADMIN — CARBIDOPA AND LEVODOPA 1 TABLET(S): 25; 100 TABLET ORAL at 09:28

## 2021-10-23 RX ADMIN — CLOPIDOGREL BISULFATE 75 MILLIGRAM(S): 75 TABLET, FILM COATED ORAL at 09:27

## 2021-10-23 RX ADMIN — Medication 1 MILLIGRAM(S): at 20:57

## 2021-10-23 RX ADMIN — SENNA PLUS 2 TABLET(S): 8.6 TABLET ORAL at 20:56

## 2021-10-23 RX ADMIN — Medication 1 APPLICATION(S): at 21:00

## 2021-10-23 RX ADMIN — Medication 75 MICROGRAM(S): at 06:54

## 2021-10-23 RX ADMIN — GABAPENTIN 100 MILLIGRAM(S): 400 CAPSULE ORAL at 20:58

## 2021-10-23 RX ADMIN — DIVALPROEX SODIUM 750 MILLIGRAM(S): 500 TABLET, DELAYED RELEASE ORAL at 20:56

## 2021-10-23 NOTE — BH INPATIENT PSYCHIATRY PROGRESS NOTE - NSBHFUPINTERVALHXFT_PSY_A_CORE
Patient seen for follow-up for psychosis. Chart reviewed and case discussed with nursing staff. Per staff, patient remains disorganized with poor boundaries. Received PRN Ativan X 1 dose yesterday. Sleep and appetite are fair and patient is compliant with medications. On exam, patient is calm but is very disorganized and appears to be responding to internal stimuli. Tells writer that she wants to showered however showered earlier per staff. Denies SI/HI.

## 2021-10-23 NOTE — BH INPATIENT PSYCHIATRY PROGRESS NOTE - CURRENT MEDICATION
MEDICATIONS  (STANDING):  ARIPiprazole 5 milliGRAM(s) Oral daily  benztropine 2 milliGRAM(s) Oral two times a day  carbidopa/levodopa  25/100 1 Tablet(s) Oral three times a day  clopidogrel Tablet 75 milliGRAM(s) Oral daily  clotrimazole/betamethasone Cream 1 Application(s) Topical two times a day  diVALproex Sprinkle 750 milliGRAM(s) Oral at bedtime  gabapentin 100 milliGRAM(s) Oral three times a day  latanoprost 0.005% Ophthalmic Solution 1 Drop(s) Both EYES at bedtime  levothyroxine 75 MICROGram(s) Oral <User Schedule>  lisinopril 10 milliGRAM(s) Oral daily  metFORMIN 500 milliGRAM(s) Oral daily  metoprolol tartrate 25 milliGRAM(s) Oral two times a day  multivitamin/minerals 1 Tablet(s) Oral daily  polyethylene glycol 3350 8.5 Gram(s) Oral daily  senna 2 Tablet(s) Oral at bedtime  traZODone 50 milliGRAM(s) Oral at bedtime    MEDICATIONS  (PRN):  ammonium lactate 12% Lotion 1 Application(s) Topical two times a day PRN dry skin  hemorrhoidal Ointment 1 Application(s) Rectal every 6 hours PRN hemorrhoid discomfort  lactulose Syrup 10 Gram(s) Oral at bedtime PRN constipation  LORazepam     Tablet 1 milliGRAM(s) Oral every 6 hours PRN anxiety  OLANZapine Injectable 2.5 milliGRAM(s) IntraMuscular once PRN severe agitation  traZODone 25 milliGRAM(s) Oral at bedtime PRN insomnia

## 2021-10-23 NOTE — BH INPATIENT PSYCHIATRY PROGRESS NOTE - NSBHASSESSSUMMFT_PSY_ALL_CORE
Pt is a 69 y/o AAF, domiciled at [facility], long hx of Schizophrenia with multiple past inpatient hospitalization, last hospitalization as per psyckes 5/2018 at [facility], past hx of aggression towards staff and peers, no documented hx of SA, pertinent PMH includes HLD, HTN, Parkinson's dx. Seizure d/o, T2DM, Hypothyroidism, Glaucoma, PVD, Asthma, Bladder Ca, BIB EMS activated by sister for agitation and worsening hallucination. Pt was medically seen found to have a UTI  exam, she was medically cleared and psych was consulted for psychosis.    On exam today, patient is calm but remains disorganized and internally preoccupied. Per staff, patient with poor boundaries. eating well. taking PO meds.   Plan: Will reduce Cogentin to 1mg BID as patient no longer on Prolixin; will continue rest of the current medication management.    Pt is a 69 y/o AAF, domiciled at [facility], long hx of Schizophrenia with multiple past inpatient hospitalization, last hospitalization as per psyckes 5/2018 at [facility], past hx of aggression towards staff and peers, no documented hx of SA, pertinent PMH includes HLD, HTN, Parkinson's dx. Seizure d/o, T2DM, Hypothyroidism, Glaucoma, PVD, Asthma, Bladder Ca, BIB EMS activated by sister for agitation and worsening hallucination. Pt was medically seen found to have a UTI  exam, she was medically cleared and psych was consulted for psychosis.    On exam today, patient is calm but remains disorganized and internally preoccupied. Per staff, patient with poor boundaries. eating well. taking PO meds.   Plan: Will continue CO. Will reduce Cogentin to 1mg BID as patient no longer on Prolixin; will continue rest of the current medication management.

## 2021-10-23 NOTE — BH INPATIENT PSYCHIATRY PROGRESS NOTE - NSBHCHARTREVIEWVS_PSY_A_CORE FT
Vital Signs Last 24 Hrs  T(C): 36.7 (10-23-21 @ 15:11), Max: 37.1 (10-23-21 @ 08:12)  T(F): 98.1 (10-23-21 @ 15:11), Max: 98.7 (10-23-21 @ 08:12)  HR: 79 (10-22-21 @ 20:32) (79 - 79)  BP: 133/87 (10-22-21 @ 20:32) (133/87 - 133/87)  RR: 18 (10-22-21 @ 20:32) (18 - 18)    10-23-21 @ 15:11  Sitting BP: 136/62 HR: 71 Vital Signs Last 24 Hrs  T(C): 36.7 (10-23-21 @ 15:11), Max: 37.1 (10-23-21 @ 08:12)  T(F): 98.1 (10-23-21 @ 15:11), Max: 98.7 (10-23-21 @ 08:12)  HR: 79 (10-22-21 @ 20:32) (79 - 79)  BP: 133/87 (10-22-21 @ 20:32) (133/87 - 133/87)  RR: 18 (10-22-21 @ 20:32) (18 - 18)

## 2021-10-23 NOTE — BH INPATIENT PSYCHIATRY PROGRESS NOTE - NSBHMETABOLIC_PSY_ALL_CORE_FT
HbA1c: A1C with Estimated Average Glucose Result: 5.6 % (10-15-21 @ 11:00)  Glucose: POCT Blood Glucose.: 61 mg/dL (10-19-21 @ 11:59)  BP: 133/87 (10-22-21 @ 20:32) (133/87 - 157/71)  Lipid Panel: Date/Time: 10-14-21 @ 09:38  Cholesterol, Serum: 159  HDL Cholesterol, Serum: 66  Triglycerides, Serum: 100

## 2021-10-24 PROCEDURE — 99232 SBSQ HOSP IP/OBS MODERATE 35: CPT

## 2021-10-24 RX ORDER — NYSTATIN CREAM 100000 [USP'U]/G
1 CREAM TOPICAL
Refills: 0 | Status: DISCONTINUED | OUTPATIENT
Start: 2021-10-24 | End: 2021-11-20

## 2021-10-24 RX ADMIN — ARIPIPRAZOLE 5 MILLIGRAM(S): 15 TABLET ORAL at 09:09

## 2021-10-24 RX ADMIN — DIVALPROEX SODIUM 750 MILLIGRAM(S): 500 TABLET, DELAYED RELEASE ORAL at 20:59

## 2021-10-24 RX ADMIN — CARBIDOPA AND LEVODOPA 1 TABLET(S): 25; 100 TABLET ORAL at 13:59

## 2021-10-24 RX ADMIN — GABAPENTIN 100 MILLIGRAM(S): 400 CAPSULE ORAL at 09:08

## 2021-10-24 RX ADMIN — CLOPIDOGREL BISULFATE 75 MILLIGRAM(S): 75 TABLET, FILM COATED ORAL at 09:09

## 2021-10-24 RX ADMIN — Medication 25 MILLIGRAM(S): at 09:09

## 2021-10-24 RX ADMIN — GABAPENTIN 100 MILLIGRAM(S): 400 CAPSULE ORAL at 13:59

## 2021-10-24 RX ADMIN — Medication 50 MILLIGRAM(S): at 21:00

## 2021-10-24 RX ADMIN — Medication 75 MICROGRAM(S): at 05:44

## 2021-10-24 RX ADMIN — GABAPENTIN 100 MILLIGRAM(S): 400 CAPSULE ORAL at 20:59

## 2021-10-24 RX ADMIN — Medication 1 MILLIGRAM(S): at 21:00

## 2021-10-24 RX ADMIN — CARBIDOPA AND LEVODOPA 1 TABLET(S): 25; 100 TABLET ORAL at 20:59

## 2021-10-24 RX ADMIN — SENNA PLUS 2 TABLET(S): 8.6 TABLET ORAL at 21:00

## 2021-10-24 RX ADMIN — LATANOPROST 1 DROP(S): 0.05 SOLUTION/ DROPS OPHTHALMIC; TOPICAL at 20:59

## 2021-10-24 RX ADMIN — Medication 25 MILLIGRAM(S): at 21:00

## 2021-10-24 RX ADMIN — Medication 1 MILLIGRAM(S): at 09:09

## 2021-10-24 RX ADMIN — LISINOPRIL 10 MILLIGRAM(S): 2.5 TABLET ORAL at 09:09

## 2021-10-24 RX ADMIN — METFORMIN HYDROCHLORIDE 500 MILLIGRAM(S): 850 TABLET ORAL at 09:09

## 2021-10-24 RX ADMIN — CARBIDOPA AND LEVODOPA 1 TABLET(S): 25; 100 TABLET ORAL at 09:09

## 2021-10-24 RX ADMIN — CLOTRIMAZOLE AND BETAMETHASONE DIPROPIONATE 1 APPLICATION(S): 10; .5 CREAM TOPICAL at 20:58

## 2021-10-24 RX ADMIN — Medication 1 TABLET(S): at 09:08

## 2021-10-24 RX ADMIN — CLOTRIMAZOLE AND BETAMETHASONE DIPROPIONATE 1 APPLICATION(S): 10; .5 CREAM TOPICAL at 09:34

## 2021-10-24 RX ADMIN — Medication 1 MILLIGRAM(S): at 20:59

## 2021-10-24 NOTE — BH INPATIENT PSYCHIATRY PROGRESS NOTE - NSBHFUPINTERVALHXFT_PSY_A_CORE
Patient seen for follow-up for psychosis. Chart reviewed and case discussed with nursing staff. Per staff, patient has a fungal rash all over her body including her feet and has been scratching her feet. On exam, patient is somewhat disorganized but able to tell writer her feet are itchy; on exam, dry flaky areas interspaced with some minimally red areas on both feet. Sleep and appetite are fair, patient is compliant with medications. Denies SI/HI. No head ache/ dizziness.

## 2021-10-24 NOTE — BH INPATIENT PSYCHIATRY PROGRESS NOTE - CURRENT MEDICATION
MEDICATIONS  (STANDING):  ARIPiprazole 5 milliGRAM(s) Oral daily  benztropine 1 milliGRAM(s) Oral two times a day  carbidopa/levodopa  25/100 1 Tablet(s) Oral three times a day  clopidogrel Tablet 75 milliGRAM(s) Oral daily  clotrimazole/betamethasone Cream 1 Application(s) Topical two times a day  diVALproex Sprinkle 750 milliGRAM(s) Oral at bedtime  gabapentin 100 milliGRAM(s) Oral three times a day  latanoprost 0.005% Ophthalmic Solution 1 Drop(s) Both EYES at bedtime  levothyroxine 75 MICROGram(s) Oral <User Schedule>  lisinopril 10 milliGRAM(s) Oral daily  metFORMIN 500 milliGRAM(s) Oral daily  metoprolol tartrate 25 milliGRAM(s) Oral two times a day  multivitamin/minerals 1 Tablet(s) Oral daily  polyethylene glycol 3350 8.5 Gram(s) Oral daily  senna 2 Tablet(s) Oral at bedtime  traZODone 50 milliGRAM(s) Oral at bedtime    MEDICATIONS  (PRN):  ammonium lactate 12% Lotion 1 Application(s) Topical two times a day PRN dry skin  hemorrhoidal Ointment 1 Application(s) Rectal every 6 hours PRN hemorrhoid discomfort  lactulose Syrup 10 Gram(s) Oral at bedtime PRN constipation  LORazepam     Tablet 1 milliGRAM(s) Oral every 6 hours PRN anxiety  nystatin Powder 1 Application(s) Topical two times a day PRN Fungal rash  OLANZapine Injectable 2.5 milliGRAM(s) IntraMuscular once PRN severe agitation  traZODone 25 milliGRAM(s) Oral at bedtime PRN insomnia

## 2021-10-24 NOTE — BH INPATIENT PSYCHIATRY PROGRESS NOTE - NSBHMETABOLIC_PSY_ALL_CORE_FT
HbA1c: A1C with Estimated Average Glucose Result: 5.6 % (10-15-21 @ 11:00)  Glucose: POCT Blood Glucose.: 61 mg/dL (10-19-21 @ 11:59)  BP: 149/67 (10-23-21 @ 20:23) (133/87 - 149/67)  Lipid Panel: Date/Time: 10-14-21 @ 09:38  Cholesterol, Serum: 159  Direct LDL: --  HDL Cholesterol, Serum: 66  Total Cholesterol/HDL Ration Measurement: --  Triglycerides, Serum: 100

## 2021-10-24 NOTE — BH INPATIENT PSYCHIATRY PROGRESS NOTE - NSBHCHARTREVIEWVS_PSY_A_CORE FT
Vital Signs Last 24 Hrs  T(C): 36.8 (10-24-21 @ 05:56), Max: 36.8 (10-24-21 @ 05:56)  T(F): 98.2 (10-24-21 @ 05:56), Max: 98.2 (10-24-21 @ 05:56)  HR: 75 (10-23-21 @ 20:23) (75 - 75)  BP: 149/67 (10-23-21 @ 20:23) (149/67 - 149/67)    Orthostatic VS  10-24-21 @ 05:56  Lying BP: 118/90 HR: 87  Sitting BP: 133/72 HR: 85

## 2021-10-24 NOTE — BH INPATIENT PSYCHIATRY PROGRESS NOTE - NSBHASSESSSUMMFT_PSY_ALL_CORE
Pt is a 71 y/o AAF, domiciled at [facility], long hx of Schizophrenia with multiple past inpatient hospitalization, last hospitalization as per psyckes 5/2018 at [facility], past hx of aggression towards staff and peers, no documented hx of SA, pertinent PMH includes HLD, HTN, Parkinson's dx. Seizure d/o, T2DM, Hypothyroidism, Glaucoma, PVD, Asthma, Bladder Ca, BIB EMS activated by sister for agitation and worsening hallucination. Pt was medically seen found to have a UTI  exam, she was medically cleared and psych was consulted for psychosis.    On exam today, patient is less disorganized, still internally preoccupied but able to relate that her feet itch and she has had a fungal rash in the past. On exam, feet flaky with minimal red areas. Per staff, patient has been eating well. Taking PO meds.   Plan: Will continue CO. Will add nystatin powder, clotrimazole/betamethasone cream already added by SAUL. Will continue rest of the current medication management. Primary team to consider discontinuing Cogentin as patient is no longer on Prolixin.

## 2021-10-25 PROCEDURE — 99232 SBSQ HOSP IP/OBS MODERATE 35: CPT

## 2021-10-25 RX ORDER — ARIPIPRAZOLE 15 MG/1
10 TABLET ORAL DAILY
Refills: 0 | Status: DISCONTINUED | OUTPATIENT
Start: 2021-10-25 | End: 2021-10-26

## 2021-10-25 RX ADMIN — GABAPENTIN 100 MILLIGRAM(S): 400 CAPSULE ORAL at 21:15

## 2021-10-25 RX ADMIN — Medication 25 MILLIGRAM(S): at 21:16

## 2021-10-25 RX ADMIN — DIVALPROEX SODIUM 750 MILLIGRAM(S): 500 TABLET, DELAYED RELEASE ORAL at 21:14

## 2021-10-25 RX ADMIN — LATANOPROST 1 DROP(S): 0.05 SOLUTION/ DROPS OPHTHALMIC; TOPICAL at 21:17

## 2021-10-25 RX ADMIN — CLOPIDOGREL BISULFATE 75 MILLIGRAM(S): 75 TABLET, FILM COATED ORAL at 09:16

## 2021-10-25 RX ADMIN — ARIPIPRAZOLE 10 MILLIGRAM(S): 15 TABLET ORAL at 09:16

## 2021-10-25 RX ADMIN — Medication 1 TABLET(S): at 09:15

## 2021-10-25 RX ADMIN — Medication 50 MILLIGRAM(S): at 21:15

## 2021-10-25 RX ADMIN — LISINOPRIL 10 MILLIGRAM(S): 2.5 TABLET ORAL at 09:16

## 2021-10-25 RX ADMIN — GABAPENTIN 100 MILLIGRAM(S): 400 CAPSULE ORAL at 09:16

## 2021-10-25 RX ADMIN — CLOTRIMAZOLE AND BETAMETHASONE DIPROPIONATE 1 APPLICATION(S): 10; .5 CREAM TOPICAL at 09:22

## 2021-10-25 RX ADMIN — Medication 1 MILLIGRAM(S): at 09:15

## 2021-10-25 RX ADMIN — Medication 25 MILLIGRAM(S): at 09:16

## 2021-10-25 RX ADMIN — Medication 1 MILLIGRAM(S): at 21:15

## 2021-10-25 RX ADMIN — CLOTRIMAZOLE AND BETAMETHASONE DIPROPIONATE 1 APPLICATION(S): 10; .5 CREAM TOPICAL at 21:16

## 2021-10-25 RX ADMIN — Medication 1 MILLIGRAM(S): at 21:19

## 2021-10-25 RX ADMIN — CARBIDOPA AND LEVODOPA 1 TABLET(S): 25; 100 TABLET ORAL at 21:15

## 2021-10-25 RX ADMIN — GABAPENTIN 100 MILLIGRAM(S): 400 CAPSULE ORAL at 12:33

## 2021-10-25 RX ADMIN — Medication 75 MICROGRAM(S): at 06:34

## 2021-10-25 RX ADMIN — CARBIDOPA AND LEVODOPA 1 TABLET(S): 25; 100 TABLET ORAL at 12:33

## 2021-10-25 RX ADMIN — SENNA PLUS 2 TABLET(S): 8.6 TABLET ORAL at 21:15

## 2021-10-25 RX ADMIN — POLYETHYLENE GLYCOL 3350 8.5 GRAM(S): 17 POWDER, FOR SOLUTION ORAL at 11:54

## 2021-10-25 RX ADMIN — CARBIDOPA AND LEVODOPA 1 TABLET(S): 25; 100 TABLET ORAL at 09:16

## 2021-10-25 RX ADMIN — METFORMIN HYDROCHLORIDE 500 MILLIGRAM(S): 850 TABLET ORAL at 09:16

## 2021-10-25 NOTE — BH INPATIENT PSYCHIATRY PROGRESS NOTE - NSBHMETABOLIC_PSY_ALL_CORE_FT
BMI: BMI (kg/m2): 25.2 (10-24-21 @ 16:01)  HbA1c: A1C with Estimated Average Glucose Result: 5.6 % (10-15-21 @ 11:00)    Glucose: POCT Blood Glucose.: 61 mg/dL (10-19-21 @ 11:59)    BP: 149/92 (10-24-21 @ 20:32) (133/87 - 160/100)  Lipid Panel: Date/Time: 10-14-21 @ 09:38  Cholesterol, Serum: 159  Direct LDL: --  HDL Cholesterol, Serum: 66  Total Cholesterol/HDL Ration Measurement: --  Triglycerides, Serum: 100

## 2021-10-25 NOTE — BH INPATIENT PSYCHIATRY PROGRESS NOTE - NSBHFUPINTERVALHXFT_PSY_A_CORE
Patient seen for follow-up for psychosis. Chart reviewed and case discussed with nursing staff. Per staff, patient has a fungal rash all over her body including her feet and has been scratching her feet. On exam, patient is disorganized and religiously preoccupied. Sleep and appetite are fair, patient is compliant with medications. Denies SI/HI. No head ache/ dizziness. Medicine consulted for fungal rash.     Abilify increased to 10 mg po daily for psychosis.

## 2021-10-25 NOTE — CHART NOTE - NSCHARTNOTEFT_GEN_A_CORE
Source: Patient [X]    Family [ ]     other [ ]    Diet : Diet, Mechanical Soft:   Consistent Carbohydrate {Evening Snack} (CSTCHOSN) (10-14-21 @ 11:25)      Patient reports [X] nausea  [ ] vomiting [ ] diarrhea [ ] constipation  [ ]chewing problems [ ] swallowing issues  [X] other: Patient reports some nausea in the mornings.       PO intake:  < 50% [ ] 50-75% [ ]   % [X]  other : Per [facility], Pt eating well but it takes ~ 1 hour to finish meal.      Source for PO intake [X] Patient [ ] family [ ] chart [X] staff [ ] other      Current Weight: Weight (kg): 62.6 (10-24 @ 16:01)  % Weight Change : 6% gain within 12 days    Pertinent Medications: MEDICATIONS  (STANDING):    diVALproex Sprinkle 750 milliGRAM(s) Oral at bedtime  levothyroxine 75 MICROGram(s) Oral <User Schedule>  lisinopril 10 milliGRAM(s) Oral daily  metFORMIN 500 milliGRAM(s) Oral daily  metoprolol tartrate 25 milliGRAM(s) Oral two times a day  multivitamin/minerals 1 Tablet(s) Oral daily  polyethylene glycol 3350 8.5 Gram(s) Oral daily  senna 2 Tablet(s) Oral at bedtime      Pertinent Labs:  none      Skin: Intact     Estimated Needs:   [X] no change since previous assessment  [ ] recalculated:       Previous Nutrition Diagnosis:     [X] Inadequate Energy Intake [ ]Inadequate Oral Intake [ ] Excessive Energy Intake     [ ] Underweight [ ] Increased Nutrient Needs [ ] Overweight/Obesity     [ ] Altered GI Function [ ] Unintended Weight Loss [ ] Food & Nutrition Related Knowledge Deficit [ ] Malnutrition          Nutrition Diagnosis is [ ] ongoing  [X] resolved [ ] not applicable          New Nutrition Diagnosis: [x] not applicable    [ ] Inadequate Protein Energy Intake [ ]Inadequate Oral Intake [ ] Excessive Energy Intake     [ ] Underweight [ ] Increased Nutrient Needs [ ] Overweight/Obesity     [ ] Altered GI Function [ ] Unintended Weight Loss [ ] Food & Nutrition Related Knowledge Deficit[ ] Limited Adherence to nutrition related recommendations [ ] Malnutrition  [ ] other: Free text    Food preferences taken and implemented.    Recommend    [ ] Change Diet To:    [ ] Nutrition Supplement    [ ] Nutrition Support    [X] Other: Continue current diet order       Monitoring and Evaluation:     [X] PO intake [ ] Tolerance to diet prescription [X] weights [X] follow up per protocol    [X] other: Lisa Mccray RDN Pager # [phone]

## 2021-10-25 NOTE — BH INPATIENT PSYCHIATRY PROGRESS NOTE - CURRENT MEDICATION
MEDICATIONS  (STANDING):  ARIPiprazole 10 milliGRAM(s) Oral daily  benztropine 1 milliGRAM(s) Oral two times a day  carbidopa/levodopa  25/100 1 Tablet(s) Oral three times a day  clopidogrel Tablet 75 milliGRAM(s) Oral daily  clotrimazole/betamethasone Cream 1 Application(s) Topical two times a day  diVALproex Sprinkle 750 milliGRAM(s) Oral at bedtime  gabapentin 100 milliGRAM(s) Oral three times a day  latanoprost 0.005% Ophthalmic Solution 1 Drop(s) Both EYES at bedtime  levothyroxine 75 MICROGram(s) Oral <User Schedule>  lisinopril 10 milliGRAM(s) Oral daily  metFORMIN 500 milliGRAM(s) Oral daily  metoprolol tartrate 25 milliGRAM(s) Oral two times a day  multivitamin/minerals 1 Tablet(s) Oral daily  polyethylene glycol 3350 8.5 Gram(s) Oral daily  senna 2 Tablet(s) Oral at bedtime  traZODone 50 milliGRAM(s) Oral at bedtime    MEDICATIONS  (PRN):  ammonium lactate 12% Lotion 1 Application(s) Topical two times a day PRN dry skin  hemorrhoidal Ointment 1 Application(s) Rectal every 6 hours PRN hemorrhoid discomfort  lactulose Syrup 10 Gram(s) Oral at bedtime PRN constipation  LORazepam     Tablet 1 milliGRAM(s) Oral every 6 hours PRN anxiety  nystatin Powder 1 Application(s) Topical two times a day PRN Fungal rash  OLANZapine Injectable 2.5 milliGRAM(s) IntraMuscular once PRN severe agitation  traZODone 25 milliGRAM(s) Oral at bedtime PRN insomnia

## 2021-10-25 NOTE — BH INPATIENT PSYCHIATRY PROGRESS NOTE - NSBHFUPINTERVALCCFT_PSY_A_CORE
Patient seen for psychosis. "I am doing good. You might be wondering why I am talking. See, I have to talk to the Lord or I will die."

## 2021-10-25 NOTE — BH INPATIENT PSYCHIATRY PROGRESS NOTE - NSBHCHARTREVIEWVS_PSY_A_CORE FT
Vital Signs Last 24 Hrs  T(C): 35.9 (10-25-21 @ 08:04), Max: 36.7 (10-24-21 @ 16:01)  T(F): 96.7 (10-25-21 @ 08:04), Max: 98.1 (10-24-21 @ 16:01)  HR: 81 (10-24-21 @ 20:32) (81 - 81)  BP: 149/92 (10-24-21 @ 20:32) (149/92 - 149/92)  BP(mean): --  RR: --  SpO2: --    Orthostatic VS  10-25-21 @ 08:04  Lying BP: --/-- HR: --  Sitting BP: --/-- HR: --  Standing BP: 138/87 HR: 94  Site: upper right arm  Mode: electronic  Orthostatic VS  10-24-21 @ 05:56  Lying BP: 118/90 HR: 87  Sitting BP: 133/72 HR: 85  Standing BP: --/-- HR: --  Site: --  Mode: --  Orthostatic VS  10-23-21 @ 15:11  Lying BP: --/-- HR: --  Sitting BP: 136/62 HR: 71  Standing BP: --/-- HR: --  Site: --  Mode: --

## 2021-10-25 NOTE — BH INPATIENT PSYCHIATRY PROGRESS NOTE - NSBHASSESSSUMMFT_PSY_ALL_CORE
Pt is a 71 y/o AAF, domiciled at [facility], long hx of Schizophrenia with multiple past inpatient hospitalization, last hospitalization as per psyckes 5/2018 at [facility], past hx of aggression towards staff and peers, no documented hx of SA, pertinent PMH includes HLD, HTN, Parkinson's dx. Seizure d/o, T2DM, Hypothyroidism, Glaucoma, PVD, Asthma, Bladder Ca, BIB EMS activated by sister for agitation and worsening hallucination. Pt was medically seen found to have a UTI  exam, she was medically cleared and psych was consulted for psychosis.    On exam today, patient is less disorganized, still internally preoccupied but able to relate that her feet itch and she has had a fungal rash in the past. On exam, feet flaky with minimal red areas. Per staff, patient has been eating well. Taking PO meds.   Plan: Will continue CO. Will add nystatin powder, clotrimazole/betamethasone cream already added by SAUL. Will continue rest of the current medication management. Primary team to consider discontinuing Cogentin as patient is no longer on Prolixin.

## 2021-10-26 PROCEDURE — 99232 SBSQ HOSP IP/OBS MODERATE 35: CPT

## 2021-10-26 RX ORDER — ARIPIPRAZOLE 15 MG/1
20 TABLET ORAL DAILY
Refills: 0 | Status: DISCONTINUED | OUTPATIENT
Start: 2021-10-26 | End: 2021-10-29

## 2021-10-26 RX ORDER — ARIPIPRAZOLE 15 MG/1
10 TABLET ORAL DAILY
Refills: 0 | Status: DISCONTINUED | OUTPATIENT
Start: 2021-10-26 | End: 2021-10-26

## 2021-10-26 RX ADMIN — Medication 75 MICROGRAM(S): at 06:34

## 2021-10-26 RX ADMIN — CLOPIDOGREL BISULFATE 75 MILLIGRAM(S): 75 TABLET, FILM COATED ORAL at 08:40

## 2021-10-26 RX ADMIN — LISINOPRIL 10 MILLIGRAM(S): 2.5 TABLET ORAL at 08:40

## 2021-10-26 RX ADMIN — CARBIDOPA AND LEVODOPA 1 TABLET(S): 25; 100 TABLET ORAL at 21:15

## 2021-10-26 RX ADMIN — ARIPIPRAZOLE 10 MILLIGRAM(S): 15 TABLET ORAL at 08:38

## 2021-10-26 RX ADMIN — CLOTRIMAZOLE AND BETAMETHASONE DIPROPIONATE 1 APPLICATION(S): 10; .5 CREAM TOPICAL at 08:44

## 2021-10-26 RX ADMIN — SENNA PLUS 2 TABLET(S): 8.6 TABLET ORAL at 21:15

## 2021-10-26 RX ADMIN — Medication 1 MILLIGRAM(S): at 08:40

## 2021-10-26 RX ADMIN — LATANOPROST 1 DROP(S): 0.05 SOLUTION/ DROPS OPHTHALMIC; TOPICAL at 21:40

## 2021-10-26 RX ADMIN — DIVALPROEX SODIUM 750 MILLIGRAM(S): 500 TABLET, DELAYED RELEASE ORAL at 21:15

## 2021-10-26 RX ADMIN — Medication 25 MILLIGRAM(S): at 21:14

## 2021-10-26 RX ADMIN — CLOTRIMAZOLE AND BETAMETHASONE DIPROPIONATE 1 APPLICATION(S): 10; .5 CREAM TOPICAL at 21:41

## 2021-10-26 RX ADMIN — Medication 1 MILLIGRAM(S): at 21:15

## 2021-10-26 RX ADMIN — GABAPENTIN 100 MILLIGRAM(S): 400 CAPSULE ORAL at 21:15

## 2021-10-26 RX ADMIN — METFORMIN HYDROCHLORIDE 500 MILLIGRAM(S): 850 TABLET ORAL at 08:39

## 2021-10-26 RX ADMIN — GABAPENTIN 100 MILLIGRAM(S): 400 CAPSULE ORAL at 12:10

## 2021-10-26 RX ADMIN — GABAPENTIN 100 MILLIGRAM(S): 400 CAPSULE ORAL at 08:40

## 2021-10-26 RX ADMIN — CARBIDOPA AND LEVODOPA 1 TABLET(S): 25; 100 TABLET ORAL at 08:38

## 2021-10-26 RX ADMIN — Medication 1 TABLET(S): at 08:40

## 2021-10-26 RX ADMIN — POLYETHYLENE GLYCOL 3350 8.5 GRAM(S): 17 POWDER, FOR SOLUTION ORAL at 12:10

## 2021-10-26 RX ADMIN — Medication 50 MILLIGRAM(S): at 21:14

## 2021-10-26 RX ADMIN — Medication 25 MILLIGRAM(S): at 08:40

## 2021-10-26 RX ADMIN — CARBIDOPA AND LEVODOPA 1 TABLET(S): 25; 100 TABLET ORAL at 12:10

## 2021-10-26 NOTE — BH INPATIENT PSYCHIATRY PROGRESS NOTE - NSBHCHARTREVIEWVS_PSY_A_CORE FT
Vital Signs Last 24 Hrs  T(C): 36.7 (10-26-21 @ 07:30), Max: 37.3 (10-25-21 @ 15:26)  T(F): 98 (10-26-21 @ 07:30), Max: 99.2 (10-25-21 @ 15:26)  HR: 83 (10-26-21 @ 07:30) (83 - 83)  BP: 138/69 (10-26-21 @ 07:30) (138/69 - 138/69)  BP(mean): --  RR: --  SpO2: --    Orthostatic VS  10-25-21 @ 08:04  Lying BP: --/-- HR: --  Sitting BP: --/-- HR: --  Standing BP: 138/87 HR: 94  Site: upper right arm  Mode: electronic

## 2021-10-26 NOTE — BH INPATIENT PSYCHIATRY PROGRESS NOTE - NSBHFUPINTERVALHXFT_PSY_A_CORE
Patient seen for follow-up for psychosis. Chart reviewed and case discussed with nursing staff. Clotrimazole/betamethasone topically bid for fungal rash is being applied. On exam, patient is disorganized and religiously preoccupied. Sleep and appetite are fair, patient is compliant with medications. Denies SI/HI. No head ache/ dizziness. Medicine consulted for fungal rash.     Abilify increased to 20 mg po daily for psychosis.

## 2021-10-26 NOTE — BH INPATIENT PSYCHIATRY PROGRESS NOTE - NSBHASSESSSUMMFT_PSY_ALL_CORE
Pt is a 69 y/o AAF, domiciled at [facility], long hx of Schizophrenia with multiple past inpatient hospitalization, last hospitalization as per psyckes 5/2018 at [facility], past hx of aggression towards staff and peers, no documented hx of SA, pertinent PMH includes HLD, HTN, Parkinson's dx. Seizure d/o, T2DM, Hypothyroidism, Glaucoma, PVD, Asthma, Bladder Ca, BIB EMS activated by sister for agitation and worsening hallucination. Pt was medically seen found to have a UTI  exam, she was medically cleared and psych was consulted for psychosis.    On exam today, patient is less disorganized, still internally preoccupied but able to relate that her feet itch and she has had a fungal rash in the past. On exam, feet flaky with minimal red areas. Per staff, patient has been eating well. Taking PO meds.   Plan: Will continue CO. Will add nystatin powder, clotrimazole/betamethasone cream already added by SAUL. Will continue rest of the current medication management. Primary team to consider discontinuing Cogentin as patient is no longer on Prolixin.

## 2021-10-26 NOTE — BH INPATIENT PSYCHIATRY PROGRESS NOTE - CURRENT MEDICATION
MEDICATIONS  (STANDING):  ARIPiprazole 20 milliGRAM(s) Oral daily  benztropine 1 milliGRAM(s) Oral two times a day  carbidopa/levodopa  25/100 1 Tablet(s) Oral three times a day  clopidogrel Tablet 75 milliGRAM(s) Oral daily  clotrimazole/betamethasone Cream 1 Application(s) Topical two times a day  diVALproex Sprinkle 750 milliGRAM(s) Oral at bedtime  gabapentin 100 milliGRAM(s) Oral three times a day  latanoprost 0.005% Ophthalmic Solution 1 Drop(s) Both EYES at bedtime  levothyroxine 75 MICROGram(s) Oral <User Schedule>  lisinopril 10 milliGRAM(s) Oral daily  metFORMIN 500 milliGRAM(s) Oral daily  metoprolol tartrate 25 milliGRAM(s) Oral two times a day  multivitamin/minerals 1 Tablet(s) Oral daily  polyethylene glycol 3350 8.5 Gram(s) Oral daily  senna 2 Tablet(s) Oral at bedtime  traZODone 50 milliGRAM(s) Oral at bedtime    MEDICATIONS  (PRN):  ammonium lactate 12% Lotion 1 Application(s) Topical two times a day PRN dry skin  hemorrhoidal Ointment 1 Application(s) Rectal every 6 hours PRN hemorrhoid discomfort  lactulose Syrup 10 Gram(s) Oral at bedtime PRN constipation  LORazepam     Tablet 1 milliGRAM(s) Oral every 6 hours PRN anxiety  nystatin Powder 1 Application(s) Topical two times a day PRN Fungal rash  OLANZapine Injectable 2.5 milliGRAM(s) IntraMuscular once PRN severe agitation  traZODone 25 milliGRAM(s) Oral at bedtime PRN insomnia

## 2021-10-26 NOTE — BH INPATIENT PSYCHIATRY PROGRESS NOTE - NSBHMETABOLIC_PSY_ALL_CORE_FT
BMI: BMI (kg/m2): 25.2 (10-24-21 @ 16:01)  HbA1c: A1C with Estimated Average Glucose Result: 5.6 % (10-15-21 @ 11:00)    Glucose: POCT Blood Glucose.: 61 mg/dL (10-19-21 @ 11:59)    BP: 138/69 (10-26-21 @ 07:30) (138/69 - 149/92)  Lipid Panel: Date/Time: 10-14-21 @ 09:38  Cholesterol, Serum: 159  Direct LDL: --  HDL Cholesterol, Serum: 66  Total Cholesterol/HDL Ration Measurement: --  Triglycerides, Serum: 100

## 2021-10-27 PROCEDURE — 99232 SBSQ HOSP IP/OBS MODERATE 35: CPT

## 2021-10-27 RX ADMIN — Medication 25 MILLIGRAM(S): at 20:54

## 2021-10-27 RX ADMIN — LISINOPRIL 10 MILLIGRAM(S): 2.5 TABLET ORAL at 08:28

## 2021-10-27 RX ADMIN — DIVALPROEX SODIUM 750 MILLIGRAM(S): 500 TABLET, DELAYED RELEASE ORAL at 20:53

## 2021-10-27 RX ADMIN — ARIPIPRAZOLE 20 MILLIGRAM(S): 15 TABLET ORAL at 08:28

## 2021-10-27 RX ADMIN — CLOTRIMAZOLE AND BETAMETHASONE DIPROPIONATE 1 APPLICATION(S): 10; .5 CREAM TOPICAL at 21:33

## 2021-10-27 RX ADMIN — CLOTRIMAZOLE AND BETAMETHASONE DIPROPIONATE 1 APPLICATION(S): 10; .5 CREAM TOPICAL at 08:29

## 2021-10-27 RX ADMIN — CLOPIDOGREL BISULFATE 75 MILLIGRAM(S): 75 TABLET, FILM COATED ORAL at 08:27

## 2021-10-27 RX ADMIN — CARBIDOPA AND LEVODOPA 1 TABLET(S): 25; 100 TABLET ORAL at 13:06

## 2021-10-27 RX ADMIN — CARBIDOPA AND LEVODOPA 1 TABLET(S): 25; 100 TABLET ORAL at 08:28

## 2021-10-27 RX ADMIN — Medication 1 MILLIGRAM(S): at 20:53

## 2021-10-27 RX ADMIN — Medication 1 MILLIGRAM(S): at 08:26

## 2021-10-27 RX ADMIN — GABAPENTIN 100 MILLIGRAM(S): 400 CAPSULE ORAL at 13:06

## 2021-10-27 RX ADMIN — Medication 1 MILLIGRAM(S): at 11:23

## 2021-10-27 RX ADMIN — LATANOPROST 1 DROP(S): 0.05 SOLUTION/ DROPS OPHTHALMIC; TOPICAL at 21:33

## 2021-10-27 RX ADMIN — GABAPENTIN 100 MILLIGRAM(S): 400 CAPSULE ORAL at 08:27

## 2021-10-27 RX ADMIN — Medication 25 MILLIGRAM(S): at 08:28

## 2021-10-27 RX ADMIN — Medication 1 TABLET(S): at 08:28

## 2021-10-27 RX ADMIN — METFORMIN HYDROCHLORIDE 500 MILLIGRAM(S): 850 TABLET ORAL at 08:28

## 2021-10-27 RX ADMIN — GABAPENTIN 100 MILLIGRAM(S): 400 CAPSULE ORAL at 20:53

## 2021-10-27 RX ADMIN — Medication 50 MILLIGRAM(S): at 20:53

## 2021-10-27 RX ADMIN — Medication 75 MICROGRAM(S): at 06:56

## 2021-10-27 RX ADMIN — CARBIDOPA AND LEVODOPA 1 TABLET(S): 25; 100 TABLET ORAL at 20:53

## 2021-10-27 NOTE — BH INPATIENT PSYCHIATRY PROGRESS NOTE - NSBHASSESSSUMMFT_PSY_ALL_CORE
Pt is a 71 y/o AAF, domiciled at [facility], long hx of Schizophrenia with multiple past inpatient hospitalization, last hospitalization as per psyckes 5/2018 at [facility], past hx of aggression towards staff and peers, no documented hx of SA, pertinent PMH includes HLD, HTN, Parkinson's dx. Seizure d/o, T2DM, Hypothyroidism, Glaucoma, PVD, Asthma, Bladder Ca, BIB EMS activated by sister for agitation and worsening hallucination. Pt was medically seen found to have a UTI  exam, she was medically cleared and psych was consulted for psychosis.    On exam today, patient is less disorganized, still internally preoccupied but able to relate that her feet itch and she has had a fungal rash in the past. On exam, feet flaky with minimal red areas. Per staff, patient has been eating well. Taking PO meds.   Plan: Will continue CO. Will add nystatin powder, clotrimazole/betamethasone cream already added by SAUL. Will continue rest of the current medication management. Primary team to consider discontinuing Cogentin as patient is no longer on Prolixin.

## 2021-10-27 NOTE — BH INPATIENT PSYCHIATRY PROGRESS NOTE - NSBHCHARTREVIEWVS_PSY_A_CORE FT
Vital Signs Last 24 Hrs  T(C): 35.7 (10-27-21 @ 07:54), Max: 36.6 (10-26-21 @ 15:31)  T(F): 96.2 (10-27-21 @ 07:54), Max: 97.9 (10-26-21 @ 15:31)  HR: --  BP: --  BP(mean): --  RR: --  SpO2: --    Orthostatic VS  10-27-21 @ 07:54  Lying BP: --/-- HR: --  Sitting BP: 138/72 HR: 86  Standing BP: 121/79 HR: 94  Site: --  Mode: --  Orthostatic VS  10-26-21 @ 19:50  Lying BP: --/-- HR: --  Sitting BP: 118/72 HR: 79  Standing BP: --/-- HR: --  Site: --  Mode: --

## 2021-10-27 NOTE — BH INPATIENT PSYCHIATRY PROGRESS NOTE - NSBHFUPINTERVALHXFT_PSY_A_CORE
Patient seen for follow-up for psychosis. Chart reviewed and case discussed with nursing staff. No overnight events reported. On evaluation, patient is calmer and less disorganized. She remains religiously preoccupied. She denies SI/HI. She is medication compliant and denies side effects. Sleep and appetite are fair. No headache, dizziness, or pain.

## 2021-10-28 DIAGNOSIS — E11.9 TYPE 2 DIABETES MELLITUS WITHOUT COMPLICATIONS: ICD-10-CM

## 2021-10-28 DIAGNOSIS — I10 ESSENTIAL (PRIMARY) HYPERTENSION: ICD-10-CM

## 2021-10-28 DIAGNOSIS — H40.9 UNSPECIFIED GLAUCOMA: ICD-10-CM

## 2021-10-28 DIAGNOSIS — E03.9 HYPOTHYROIDISM, UNSPECIFIED: ICD-10-CM

## 2021-10-28 PROCEDURE — 99232 SBSQ HOSP IP/OBS MODERATE 35: CPT

## 2021-10-28 RX ADMIN — CARBIDOPA AND LEVODOPA 1 TABLET(S): 25; 100 TABLET ORAL at 08:11

## 2021-10-28 RX ADMIN — CARBIDOPA AND LEVODOPA 1 TABLET(S): 25; 100 TABLET ORAL at 12:19

## 2021-10-28 RX ADMIN — Medication 1 MILLIGRAM(S): at 21:07

## 2021-10-28 RX ADMIN — Medication 1 MILLIGRAM(S): at 13:01

## 2021-10-28 RX ADMIN — LISINOPRIL 10 MILLIGRAM(S): 2.5 TABLET ORAL at 08:12

## 2021-10-28 RX ADMIN — Medication 25 MILLIGRAM(S): at 08:11

## 2021-10-28 RX ADMIN — POLYETHYLENE GLYCOL 3350 8.5 GRAM(S): 17 POWDER, FOR SOLUTION ORAL at 08:51

## 2021-10-28 RX ADMIN — Medication 50 MILLIGRAM(S): at 21:06

## 2021-10-28 RX ADMIN — CLOPIDOGREL BISULFATE 75 MILLIGRAM(S): 75 TABLET, FILM COATED ORAL at 08:11

## 2021-10-28 RX ADMIN — GABAPENTIN 100 MILLIGRAM(S): 400 CAPSULE ORAL at 21:05

## 2021-10-28 RX ADMIN — CLOTRIMAZOLE AND BETAMETHASONE DIPROPIONATE 1 APPLICATION(S): 10; .5 CREAM TOPICAL at 21:53

## 2021-10-28 RX ADMIN — LATANOPROST 1 DROP(S): 0.05 SOLUTION/ DROPS OPHTHALMIC; TOPICAL at 21:07

## 2021-10-28 RX ADMIN — CLOTRIMAZOLE AND BETAMETHASONE DIPROPIONATE 1 APPLICATION(S): 10; .5 CREAM TOPICAL at 08:48

## 2021-10-28 RX ADMIN — ARIPIPRAZOLE 20 MILLIGRAM(S): 15 TABLET ORAL at 08:11

## 2021-10-28 RX ADMIN — DIVALPROEX SODIUM 750 MILLIGRAM(S): 500 TABLET, DELAYED RELEASE ORAL at 21:05

## 2021-10-28 RX ADMIN — GABAPENTIN 100 MILLIGRAM(S): 400 CAPSULE ORAL at 08:11

## 2021-10-28 RX ADMIN — Medication 25 MILLIGRAM(S): at 21:07

## 2021-10-28 RX ADMIN — Medication 1 MILLIGRAM(S): at 08:12

## 2021-10-28 RX ADMIN — Medication 75 MICROGRAM(S): at 06:48

## 2021-10-28 RX ADMIN — SENNA PLUS 2 TABLET(S): 8.6 TABLET ORAL at 03:34

## 2021-10-28 RX ADMIN — Medication 1 MILLIGRAM(S): at 02:43

## 2021-10-28 RX ADMIN — Medication 25 MILLIGRAM(S): at 23:49

## 2021-10-28 RX ADMIN — SENNA PLUS 2 TABLET(S): 8.6 TABLET ORAL at 21:06

## 2021-10-28 RX ADMIN — CARBIDOPA AND LEVODOPA 1 TABLET(S): 25; 100 TABLET ORAL at 21:06

## 2021-10-28 RX ADMIN — Medication 1 TABLET(S): at 08:11

## 2021-10-28 RX ADMIN — GABAPENTIN 100 MILLIGRAM(S): 400 CAPSULE ORAL at 12:19

## 2021-10-28 RX ADMIN — METFORMIN HYDROCHLORIDE 500 MILLIGRAM(S): 850 TABLET ORAL at 08:11

## 2021-10-28 NOTE — CONSULT NOTE ADULT - SUBJECTIVE AND OBJECTIVE BOX
HPI:   70 year old female with exacerbation of Schizophrenia.  Pt picking something up from bathroom floor and while rising hit right forehead against sink.  Pt denies dizziness, -Headache, -LOC.      PAST MEDICAL & SURGICAL HISTORY:  Parkinson disease    Seizure disorder    Hypothyroidism    Type II diabetes mellitus    Hyperlipidemia    Schizophrenia    Hemorrhoids    No significant past surgical history          Allergies    penicillin (Hives; Rash)      Social History: -CIGS, -ETOH, -DRUGS    FAMILY HISTORY:  NO SIGNIFICANT FAMILY MEDICAL HX IN FIRST DEGREE RELATIVES.    MEDICATIONS  (STANDING):  ARIPiprazole 30 milliGRAM(s) Oral daily  benztropine 1 milliGRAM(s) Oral two times a day  carbidopa/levodopa  25/100 1 Tablet(s) Oral three times a day  clopidogrel Tablet 75 milliGRAM(s) Oral daily  clotrimazole/betamethasone Cream 1 Application(s) Topical two times a day  clotrimazole/betamethasone Cream 1 Application(s) Topical daily  diVALproex Sprinkle 750 milliGRAM(s) Oral at bedtime  gabapentin 100 milliGRAM(s) Oral three times a day  latanoprost 0.005% Ophthalmic Solution 1 Drop(s) Both EYES at bedtime  levothyroxine 75 MICROGram(s) Oral <User Schedule>  lisinopril 10 milliGRAM(s) Oral daily  metFORMIN 500 milliGRAM(s) Oral daily  metoprolol tartrate 25 milliGRAM(s) Oral two times a day  multivitamin/minerals 1 Tablet(s) Oral daily  polyethylene glycol 3350 8.5 Gram(s) Oral daily  senna 2 Tablet(s) Oral at bedtime  traZODone 50 milliGRAM(s) Oral at bedtime    MEDICATIONS  (PRN):  ammonium lactate 12% Lotion 1 Application(s) Topical two times a day PRN dry skin  hemorrhoidal Ointment 1 Application(s) Rectal every 6 hours PRN hemorrhoid discomfort  lactulose Syrup 10 Gram(s) Oral at bedtime PRN constipation  LORazepam     Tablet 1 milliGRAM(s) Oral every 6 hours PRN anxiety  nystatin Powder 1 Application(s) Topical two times a day PRN Fungal rash  OLANZapine Injectable 2.5 milliGRAM(s) IntraMuscular once PRN severe agitation  traZODone 25 milliGRAM(s) Oral at bedtime PRN insomnia    VS:  132/85  90  OXYGEN %      PHYSICAL EXAM:  GENERAL: NAD, well-developed  HEAD:  SMALL AREA OF SUPERFICIAL SWELLING 2CM2 RIGHT FOREHEAD, -LACERATIONS, -BRUISES, -ERYTHEMA, -PAIN WITH PALPATION  EYES: EOMI, conjunctiva and sclera clear  NECK: Supple, No JVD  CHEST/LUNG: Clear to auscultation bilaterally; No wheeze  HEART: Regular rate and rhythm; No murmurs, rubs, or gallops  ABDOMEN: Soft, Nontender, Nondistended; Bowel sounds present  EXTREMITIES:   No clubbing, cyanosis, or edema  NEUROLOGY: non-focal  SKIN: No rashes or lesions    LABS:  SEE EHR    EKG:  SEE EHR      Consultant(s) Notes Reviewed:  NOTES REVIEWED    Care Discussed with Consultants/Other Providers:  ATTENDING AND NURSING STAFF  
[facility]  70y  Female [id number]    Patient is a 70y old  Female who presents with a chief complaint of uterine prolapse    HPI: No clinical Hx available  Patient unable to answer questions Possible P4 Patient wears diaper Assume urinary incontinence  Recent pelvic CT shows normal pelvic anatomy Recent urine C&S is negative for UTI      REVIEW OF SYSTEMS:  Unable to obtain from patient    MEDICATIONS  (STANDING):  ARIPiprazole 5 milliGRAM(s) Oral daily  benztropine 2 milliGRAM(s) Oral two times a day  carbidopa/levodopa  25/100 1 Tablet(s) Oral three times a day  clopidogrel Tablet 75 milliGRAM(s) Oral daily  diVALproex Sprinkle 750 milliGRAM(s) Oral at bedtime  gabapentin 100 milliGRAM(s) Oral three times a day  latanoprost 0.005% Ophthalmic Solution 1 Drop(s) Both EYES at bedtime  levothyroxine 75 MICROGram(s) Oral <User Schedule>  lisinopril 10 milliGRAM(s) Oral daily  metFORMIN 500 milliGRAM(s) Oral daily  metoprolol tartrate 25 milliGRAM(s) Oral two times a day  multivitamin/minerals 1 Tablet(s) Oral daily  polyethylene glycol 3350 8.5 Gram(s) Oral daily  senna 2 Tablet(s) Oral at bedtime  traZODone 50 milliGRAM(s) Oral at bedtime    MEDICATIONS  (PRN):  ammonium lactate 12% Lotion 1 Application(s) Topical two times a day PRN dry skin  hemorrhoidal Ointment 1 Application(s) Rectal every 6 hours PRN hemorrhoid discomfort  lactulose Syrup 10 Gram(s) Oral at bedtime PRN constipation  LORazepam     Tablet 1 milliGRAM(s) Oral every 6 hours PRN anxiety  OLANZapine Injectable 2.5 milliGRAM(s) IntraMuscular once PRN severe agitation  traZODone 25 milliGRAM(s) Oral at bedtime PRN insomnia      Allergies    penicillin (Hives; Rash)    Intolerances        PAST MEDICAL & SURGICAL HISTORY:  Parkinson disease    Seizure disorder    Hypothyroidism    Type II diabetes mellitus    Hyperlipidemia    Schizophrenia    Hemorrhoids    No significant past surgical history        OB/GYN HISTORY:   Menarchy           Cycle Length              Days Flow                                       Last Menstrual Period                                         G    P                                       Last Pap smear:   [id number]                                            FAMILY HISTORY:      SOCIAL HISTORY:    Vital Signs Last 24 Hrs  T(C): 36.4 (21 Oct 2021 20:26), Max: 36.9 (21 Oct 2021 15:42)  T(F): 97.5 (21 Oct 2021 20:26), Max: 98.4 (21 Oct 2021 15:42)  HR: --  BP: --  BP(mean): --  RR: --  SpO2: --          PHYSICAL EXAM:  Constitutional: NAD, awake, flattened affect, nonverbal    Gastrointestinal: Soft, nontender, nondistended, no guarding, no rebound no masses  : Ext without lesions Vagina + grade 2 cystocele, without bleeding or discharge Uterus atrohic, mobile nontender Adnexa without masses, nontender      RADIOLOGY & ADDITIONAL STUDIES:  < from: CT Abdomen and Pelvis w/ IV Cont (10.10.21 @ 18:41) >    EXAM:  CT CHEST IC      EXAM:  CT ABDOMEN AND PELVIS IC        PROCEDURE DATE:  Oct 10 2021         INTERPRETATION:  CLINICAL INFORMATION: History of bladder carcinoma with weight loss. Evaluate for mass/metastatic disease.    COMPARISON: None.    CONTRAST/COMPLICATIONS:  IV Contrast: Omnipaque 350  90 cc administered   10 cc discarded  Oral Contrast: NONE  Complications: None reported at time of study completion    PROCEDURE:  CT of the Chest, Abdomen and Pelvis was performed.  Sagittal and coronal reformats were performed.    FINDINGS:  CHEST:  LUNGS AND LARGE AIRWAYS: Patent central airways. No evidence for lung parenchymal infiltrate, consolidation or mass lesion.  PLEURA: No pleural effusion.  VESSELS: Within normal limits.  HEART: Heart size is normal. No pericardial effusion.  MEDIASTINUM AND NATHAN: No lymphadenopathy.  CHEST WALL AND LOWER NECK: Within normal limits.    ABDOMEN AND PELVIS:  LIVER: Within normal limits.  BILE DUCTS: Normal caliber.  GALLBLADDER: Within normal limits.  SPLEEN: Within normal limits.  PANCREAS: Within normal limits.  ADRENALS: Within normal limits.  KIDNEYS/URETERS: No hydronephrosis. No space-occupying lesions. 4 mm calculus lower pole aspect right kidney. No left renal calculi noted.    BLADDER: Distended. No bladder wall thickening. No intraluminal mass is noted.  REPRODUCTIVE ORGANS: Uterus and adnexa within normal limits.    BOWEL: Abundant fecal material scattered throughout the colon. No evidence for mechanical bowel obstruction. No colonic wall thickening. Appendix is normal.  PERITONEUM: No ascites.  VESSELS: Bilateral iliac venous stents identified.  RETROPERITONEUM/LYMPH NODES: Bilateral inguinal lymphadenopathy measures up to 2.3 x 1.7 cm. No retroperitoneal para-aortic lymphadenopathy identified.  ABDOMINAL WALL: Within normal limits.  BONES: Intervertebral disc space narrowing identified at L5-S1 with vertebral body endplate change. Degenerative changes noted within the thoracic spine.    IMPRESSION: No CT evidence for intrathoracic metastatic disease. Marked bladder distention; no bladder wall thickening identified. Bilateral inguinal lymphadenopathy measures up to 2.2 x 1.7 cm. See above discussion.          --- End of Report ---              BRIANNE GR MD; Attending Radiologist  This document has been electronically signed. Oct 10 2021  7:09PM    < end of copied text >

## 2021-10-28 NOTE — BH INPATIENT PSYCHIATRY PROGRESS NOTE - NSBHCHARTREVIEWVS_PSY_A_CORE FT
Vital Signs Last 24 Hrs  T(C): 36.6 (10-28-21 @ 07:58), Max: 36.9 (10-27-21 @ 15:45)  T(F): 97.9 (10-28-21 @ 07:58), Max: 98.5 (10-27-21 @ 15:45)  HR: 82 (10-27-21 @ 20:31) (82 - 82)  BP: 129/84 (10-27-21 @ 20:31) (129/84 - 129/84)  BP(mean): --  RR: --  SpO2: --    Orthostatic VS  10-28-21 @ 07:58  Lying BP: --/-- HR: --  Sitting BP: 137/81 HR: 80  Standing BP: 132/89 HR: 86  Site: --  Mode: --  Orthostatic VS  10-27-21 @ 07:54  Lying BP: --/-- HR: --  Sitting BP: 138/72 HR: 86  Standing BP: 121/79 HR: 94  Site: --  Mode: --  Orthostatic VS  10-26-21 @ 19:50  Lying BP: --/-- HR: --  Sitting BP: 118/72 HR: 79  Standing BP: --/-- HR: --  Site: --  Mode: --

## 2021-10-28 NOTE — CONSULT NOTE ADULT - CONSULT REASON
Prolapsed uterus
Asked by attending to see this 70 year old female with Schizophrenia now with exacerbation with multiple medical problems including Bladder CA, Parkinsons, DMT2, Asthma, Hypothyroidism who hit her head on sick after picking up objection on bathroom floor.

## 2021-10-28 NOTE — BH INPATIENT PSYCHIATRY PROGRESS NOTE - NSBHMETABOLIC_PSY_ALL_CORE_FT
BMI: BMI (kg/m2): 25.2 (10-24-21 @ 16:01)  HbA1c: A1C with Estimated Average Glucose Result: 5.6 % (10-15-21 @ 11:00)    Glucose: POCT Blood Glucose.: 61 mg/dL (10-19-21 @ 11:59)    BP: 129/84 (10-27-21 @ 20:31) (129/84 - 138/69)  Lipid Panel: Date/Time: 10-14-21 @ 09:38  Cholesterol, Serum: 159  Direct LDL: --  HDL Cholesterol, Serum: 66  Total Cholesterol/HDL Ration Measurement: --  Triglycerides, Serum: 100

## 2021-10-28 NOTE — BH INPATIENT PSYCHIATRY PROGRESS NOTE - NSBHMSESPABN_PSY_A_CORE
Soft volume/Slowed rate
Impaired articulation
Loud volume/Pressured rate/Impaired articulation
Soft volume/Impaired articulation

## 2021-10-28 NOTE — BH INPATIENT PSYCHIATRY PROGRESS NOTE - NSBHFUPINTERVALHXFT_PSY_A_CORE
Chart reviewed and patient interviewed with Extern Dr. Kunz. Case discussed with treatment team. As per staff patient fell and hit her head. On evaluation, today patient is less disorganized. She remains religiously preoccupied. She denies SI/HI. She is medication compliant and denies side effects. Sleep and appetite are fair. No headache, dizziness, or pain.

## 2021-10-28 NOTE — BH INPATIENT PSYCHIATRY PROGRESS NOTE - ORIENTATION
knew it was October 2021, but not date/day
knew it was October 2021, know president's name started with a B
knew it was October 2021, but not date/day
knew it was October 2021, but not date/day

## 2021-10-28 NOTE — BH INPATIENT PSYCHIATRY PROGRESS NOTE - NSBHMSEPERCEPT_PSY_A_CORE
Auditory hallucinations/Other
No abnormalities
No abnormalities
Auditory hallucinations/Other

## 2021-10-28 NOTE — BH INPATIENT PSYCHIATRY PROGRESS NOTE - NSBHMSESPEECH_PSY_A_CORE
Abnormal as indicated, otherwise normal...

## 2021-10-28 NOTE — BH INPATIENT PSYCHIATRY PROGRESS NOTE - NSBHPSYCHOLCOGABN_PSY_A_CORE
disoriented to situation
disoriented to situation
disoriented to time/disoriented to place/disoriented to situation
disoriented to situation
disoriented to situation
disoriented to time/disoriented to situation

## 2021-10-28 NOTE — CONSULT NOTE ADULT - ASSESSMENT
70 year old female with exacerbation of Schizophrenia now seen after hitting head on bathroom sink after picking object up off bathroom floor.  1.  HEAD TRAUMA:  MINIMAL.  PT DID NOT FALL AND HIT HEAD WITH FORCE OF WEIGHT AND GRAVITY.  PT HIT HEAD AS SHE STOOD UP.  PT DENIES DIZZINESS, -HEADACHE, -BLURRED VISION, -LOC.  OBSERVE  2.  HTN:  BP STABLE WITH LISINOPRIL 10 MG QD AND METOPROLOL 25 MG BID.  3.  DMT2:  METFORMIN 500 MG QD.  STABLE  4.  PARKINSONS:  CONTINUE WITH CARBIDOPA AND LEVODOPA AS PLANNED  5.  HYPOTHYROIDISM:  SYNTHROID  6.  ANTICOAGULATION:  PT ON PLAVIX.  PRESENTLY UNCLEAR AS TO PURPOSE.  7.  BLADDER CA HX:  CHECK UA.  8.  PSYCH: AS PER ATTENDING

## 2021-10-28 NOTE — BH INPATIENT PSYCHIATRY PROGRESS NOTE - NSBHASSESSSUMMFT_PSY_ALL_CORE
Pt is a 69 y/o AAF, domiciled at [facility], long hx of Schizophrenia with multiple past inpatient hospitalization, last hospitalization as per psyckes 5/2018 at [facility], past hx of aggression towards staff and peers, no documented hx of SA, pertinent PMH includes HLD, HTN, Parkinson's dx. Seizure d/o, T2DM, Hypothyroidism, Glaucoma, PVD, Asthma, Bladder Ca, BIB EMS activated by sister for agitation and worsening hallucination. Pt was medically seen found to have a UTI  exam, she was medically cleared and psych was consulted for psychosis.           On assessment today, patient is less disorganized, still internally preoccupied.    Plan:   continue current medications     Psychiatric:  ARIPiprazole 20 milliGRAM(s) Oral daily  benztropine 1 milliGRAM(s) Oral two times a day  diVALproex Sprinkle 750 milliGRAM(s) Oral at bedtime  gabapentin 100 milliGRAM(s) Oral three times a day  traZODone 50 milliGRAM(s) Oral at bedtime    Medical:  carbidopa/levodopa  25/100 1 Tablet(s) Oral three times a day  clopidogrel Tablet 75 milliGRAM(s) Oral daily  clotrimazole/betamethasone Cream 1 Application(s) Topical two times a day  latanoprost 0.005% Ophthalmic Solution 1 Drop(s) Both EYES at bedtime  levothyroxine 75 MICROGram(s) Oral <User Schedule>  lisinopril 10 milliGRAM(s) Oral daily  metFORMIN 500 milliGRAM(s) Oral daily  metoprolol tartrate 25 milliGRAM(s) Oral two times a day  multivitamin/minerals 1 Tablet(s) Oral daily  polyethylene glycol 3350 8.5 Gram(s) Oral daily  senna 2 Tablet(s) Oral at bedtime    individual, group and milieu therapy  discharge planning in progress      Pt is a 71 y/o AAF, domiciled at [facility], long hx of Schizophrenia with multiple past inpatient hospitalization, last hospitalization as per psyckes 5/2018 at [facility], past hx of aggression towards staff and peers, no documented hx of SA, pertinent PMH includes HLD, HTN, Parkinson's dx. Seizure d/o, T2DM, Hypothyroidism, Glaucoma, PVD, Asthma, Bladder Ca, BIB EMS activated by sister for agitation and worsening hallucination. Pt was medically seen found to have a UTI  exam, she was medically cleared and psych was consulted for psychosis.           On assessment today, patient is less disorganized, still internally preoccupied.     Plan:   continue current medications     Psychiatric:  ARIPiprazole 20 milliGRAM(s) Oral daily  benztropine 1 milliGRAM(s) Oral two times a day  diVALproex Sprinkle 750 milliGRAM(s) Oral at bedtime  gabapentin 100 milliGRAM(s) Oral three times a day  traZODone 50 milliGRAM(s) Oral at bedtime    Medical:  carbidopa/levodopa  25/100 1 Tablet(s) Oral three times a day  clopidogrel Tablet 75 milliGRAM(s) Oral daily  clotrimazole/betamethasone Cream 1 Application(s) Topical two times a day  latanoprost 0.005% Ophthalmic Solution 1 Drop(s) Both EYES at bedtime  levothyroxine 75 MICROGram(s) Oral <User Schedule>  lisinopril 10 milliGRAM(s) Oral daily  metFORMIN 500 milliGRAM(s) Oral daily  metoprolol tartrate 25 milliGRAM(s) Oral two times a day  multivitamin/minerals 1 Tablet(s) Oral daily  polyethylene glycol 3350 8.5 Gram(s) Oral daily  senna 2 Tablet(s) Oral at bedtime    Hospitalist to follow up patient due to fall  individual, group and milieu therapy  discharge planning in progress

## 2021-10-28 NOTE — BH INPATIENT PSYCHIATRY PROGRESS NOTE - NSBHMSEMUSCLE_PSY_A_CORE
Normal muscle tone/strength
Unable to assess
Normal muscle tone/strength

## 2021-10-28 NOTE — BH INPATIENT PSYCHIATRY PROGRESS NOTE - ADDITIONAL DETAILS / COMMENTS
Patient says she feels threatened in facility, not here, denies voices but cannot r/o her being guarded and withholding 

## 2021-10-28 NOTE — BH INPATIENT PSYCHIATRY PROGRESS NOTE - NSBHMSETHTPROC_PSY_A_CORE
Disorganized/Tangential/Illogical/Impaired reasoning
Disorganized/Tangential/Illogical/Impaired reasoning
Disorganized/Tangential/Impaired reasoning
Disorganized/Tangential/Illogical/Impaired reasoning

## 2021-10-28 NOTE — BH INPATIENT PSYCHIATRY PROGRESS NOTE - NSBHPSYCHOLCOGORIENT_PSY_A_CORE
Not fully oriented...

## 2021-10-29 PROCEDURE — 99232 SBSQ HOSP IP/OBS MODERATE 35: CPT

## 2021-10-29 RX ORDER — CLOTRIMAZOLE AND BETAMETHASONE DIPROPIONATE 10; .5 MG/G; MG/G
1 CREAM TOPICAL DAILY
Refills: 0 | Status: DISCONTINUED | OUTPATIENT
Start: 2021-10-29 | End: 2021-11-04

## 2021-10-29 RX ORDER — ARIPIPRAZOLE 15 MG/1
30 TABLET ORAL DAILY
Refills: 0 | Status: DISCONTINUED | OUTPATIENT
Start: 2021-10-29 | End: 2021-11-05

## 2021-10-29 RX ADMIN — LISINOPRIL 10 MILLIGRAM(S): 2.5 TABLET ORAL at 08:05

## 2021-10-29 RX ADMIN — DIVALPROEX SODIUM 750 MILLIGRAM(S): 500 TABLET, DELAYED RELEASE ORAL at 20:06

## 2021-10-29 RX ADMIN — METFORMIN HYDROCHLORIDE 500 MILLIGRAM(S): 850 TABLET ORAL at 08:06

## 2021-10-29 RX ADMIN — CLOTRIMAZOLE AND BETAMETHASONE DIPROPIONATE 1 APPLICATION(S): 10; .5 CREAM TOPICAL at 09:03

## 2021-10-29 RX ADMIN — LATANOPROST 1 DROP(S): 0.05 SOLUTION/ DROPS OPHTHALMIC; TOPICAL at 21:10

## 2021-10-29 RX ADMIN — CARBIDOPA AND LEVODOPA 1 TABLET(S): 25; 100 TABLET ORAL at 20:07

## 2021-10-29 RX ADMIN — ARIPIPRAZOLE 20 MILLIGRAM(S): 15 TABLET ORAL at 08:05

## 2021-10-29 RX ADMIN — Medication 25 MILLIGRAM(S): at 20:07

## 2021-10-29 RX ADMIN — Medication 1 MILLIGRAM(S): at 20:07

## 2021-10-29 RX ADMIN — Medication 1 TABLET(S): at 08:05

## 2021-10-29 RX ADMIN — Medication 50 MILLIGRAM(S): at 20:08

## 2021-10-29 RX ADMIN — Medication 25 MILLIGRAM(S): at 08:05

## 2021-10-29 RX ADMIN — GABAPENTIN 100 MILLIGRAM(S): 400 CAPSULE ORAL at 08:06

## 2021-10-29 RX ADMIN — SENNA PLUS 2 TABLET(S): 8.6 TABLET ORAL at 21:06

## 2021-10-29 RX ADMIN — GABAPENTIN 100 MILLIGRAM(S): 400 CAPSULE ORAL at 12:32

## 2021-10-29 RX ADMIN — CLOTRIMAZOLE AND BETAMETHASONE DIPROPIONATE 1 APPLICATION(S): 10; .5 CREAM TOPICAL at 21:11

## 2021-10-29 RX ADMIN — Medication 75 MICROGRAM(S): at 06:23

## 2021-10-29 RX ADMIN — GABAPENTIN 100 MILLIGRAM(S): 400 CAPSULE ORAL at 20:07

## 2021-10-29 RX ADMIN — Medication 1 APPLICATION(S): at 21:11

## 2021-10-29 RX ADMIN — CARBIDOPA AND LEVODOPA 1 TABLET(S): 25; 100 TABLET ORAL at 12:32

## 2021-10-29 RX ADMIN — POLYETHYLENE GLYCOL 3350 8.5 GRAM(S): 17 POWDER, FOR SOLUTION ORAL at 12:37

## 2021-10-29 RX ADMIN — Medication 1 MILLIGRAM(S): at 08:05

## 2021-10-29 RX ADMIN — CLOPIDOGREL BISULFATE 75 MILLIGRAM(S): 75 TABLET, FILM COATED ORAL at 08:05

## 2021-10-29 RX ADMIN — CARBIDOPA AND LEVODOPA 1 TABLET(S): 25; 100 TABLET ORAL at 08:05

## 2021-10-29 NOTE — BH INPATIENT PSYCHIATRY PROGRESS NOTE - CURRENT MEDICATION
MEDICATIONS  (STANDING):  ARIPiprazole 20 milliGRAM(s) Oral daily  benztropine 1 milliGRAM(s) Oral two times a day  carbidopa/levodopa  25/100 1 Tablet(s) Oral three times a day  clopidogrel Tablet 75 milliGRAM(s) Oral daily  clotrimazole/betamethasone Cream 1 Application(s) Topical two times a day  diVALproex Sprinkle 750 milliGRAM(s) Oral at bedtime  gabapentin 100 milliGRAM(s) Oral three times a day  latanoprost 0.005% Ophthalmic Solution 1 Drop(s) Both EYES at bedtime  levothyroxine 75 MICROGram(s) Oral <User Schedule>  lisinopril 10 milliGRAM(s) Oral daily  metFORMIN 500 milliGRAM(s) Oral daily  metoprolol tartrate 25 milliGRAM(s) Oral two times a day  multivitamin/minerals 1 Tablet(s) Oral daily  polyethylene glycol 3350 8.5 Gram(s) Oral daily  senna 2 Tablet(s) Oral at bedtime  traZODone 50 milliGRAM(s) Oral at bedtime    MEDICATIONS  (PRN):  ammonium lactate 12% Lotion 1 Application(s) Topical two times a day PRN dry skin  hemorrhoidal Ointment 1 Application(s) Rectal every 6 hours PRN hemorrhoid discomfort  lactulose Syrup 10 Gram(s) Oral at bedtime PRN constipation  LORazepam     Tablet 1 milliGRAM(s) Oral every 6 hours PRN anxiety  nystatin Powder 1 Application(s) Topical two times a day PRN Fungal rash  OLANZapine Injectable 2.5 milliGRAM(s) IntraMuscular once PRN severe agitation  traZODone 25 milliGRAM(s) Oral at bedtime PRN insomnia   MEDICATIONS  (STANDING):  ARIPiprazole 30 milliGRAM(s) Oral daily  benztropine 1 milliGRAM(s) Oral two times a day  carbidopa/levodopa  25/100 1 Tablet(s) Oral three times a day  clopidogrel Tablet 75 milliGRAM(s) Oral daily  clotrimazole/betamethasone Cream 1 Application(s) Topical two times a day  diVALproex Sprinkle 750 milliGRAM(s) Oral at bedtime  gabapentin 100 milliGRAM(s) Oral three times a day  latanoprost 0.005% Ophthalmic Solution 1 Drop(s) Both EYES at bedtime  levothyroxine 75 MICROGram(s) Oral <User Schedule>  lisinopril 10 milliGRAM(s) Oral daily  metFORMIN 500 milliGRAM(s) Oral daily  metoprolol tartrate 25 milliGRAM(s) Oral two times a day  multivitamin/minerals 1 Tablet(s) Oral daily  polyethylene glycol 3350 8.5 Gram(s) Oral daily  senna 2 Tablet(s) Oral at bedtime  traZODone 50 milliGRAM(s) Oral at bedtime    MEDICATIONS  (PRN):  ammonium lactate 12% Lotion 1 Application(s) Topical two times a day PRN dry skin  hemorrhoidal Ointment 1 Application(s) Rectal every 6 hours PRN hemorrhoid discomfort  lactulose Syrup 10 Gram(s) Oral at bedtime PRN constipation  LORazepam     Tablet 1 milliGRAM(s) Oral every 6 hours PRN anxiety  nystatin Powder 1 Application(s) Topical two times a day PRN Fungal rash  OLANZapine Injectable 2.5 milliGRAM(s) IntraMuscular once PRN severe agitation  traZODone 25 milliGRAM(s) Oral at bedtime PRN insomnia

## 2021-10-29 NOTE — BH INPATIENT PSYCHIATRY PROGRESS NOTE - NSBHFUPINTERVALHXFT_PSY_A_CORE
Patient seen for follow-up for psychosis. Chart reviewed and case discussed with nursing staff. No overnight events reported. On evaluation, patient is disorganized, religiously preoccupied, and has somatic delusions. She denies SI/HI. She is medication compliant and denies side effects. Sleep is poor and appetite are fair. No headache, dizziness, or pain. Patient seen for follow-up for psychosis. Chart reviewed and case discussed with nursing staff. No overnight events reported. On evaluation, patient is grossly psychotic, religiously preoccupied, and has somatic delusions. She denies SI/HI. She is medication compliant and denies side effects. Sleep is poor and appetite are fair. No headache, dizziness, or pain.    Abilify increased to 30 mg po daily for psychosis.

## 2021-10-29 NOTE — BH INPATIENT PSYCHIATRY PROGRESS NOTE - NSBHCHARTREVIEWVS_PSY_A_CORE FT
Vital Signs Last 24 Hrs  T(C): 36.9 (10-29-21 @ 07:39), Max: 37 (10-28-21 @ 15:45)  T(F): 98.4 (10-29-21 @ 07:39), Max: 98.6 (10-28-21 @ 15:45)  HR: --  BP: --  BP(mean): --  RR: 16 (10-28-21 @ 21:00) (16 - 16)  SpO2: --    Orthostatic VS  10-29-21 @ 07:39  Lying BP: --/-- HR: --  Sitting BP: 137/95 HR: 80  Standing BP: 127/89 HR: 84  Site: --  Mode: --  Orthostatic VS  10-28-21 @ 21:00  Lying BP: --/-- HR: --  Sitting BP: 132/85 HR: 90  Standing BP: --/-- HR: --  Site: --  Mode: --  Orthostatic VS  10-28-21 @ 07:58  Lying BP: --/-- HR: --  Sitting BP: 137/81 HR: 80  Standing BP: 132/89 HR: 86  Site: --  Mode: --

## 2021-10-29 NOTE — BH INPATIENT PSYCHIATRY PROGRESS NOTE - NSBHMETABOLIC_PSY_ALL_CORE_FT
BMI: BMI (kg/m2): 25.2 (10-24-21 @ 16:01)  HbA1c: A1C with Estimated Average Glucose Result: 5.6 % (10-15-21 @ 11:00)    Glucose: POCT Blood Glucose.: 61 mg/dL (10-19-21 @ 11:59)    BP: 129/84 (10-27-21 @ 20:31) (129/84 - 129/84)  Lipid Panel: Date/Time: 10-14-21 @ 09:38  Cholesterol, Serum: 159  Direct LDL: --  HDL Cholesterol, Serum: 66  Total Cholesterol/HDL Ration Measurement: --  Triglycerides, Serum: 100

## 2021-10-29 NOTE — BH INPATIENT PSYCHIATRY PROGRESS NOTE - NSBHASSESSSUMMFT_PSY_ALL_CORE
Pt is a 71 y/o AAF, domiciled at [facility], long hx of Schizophrenia with multiple past inpatient hospitalization, last hospitalization as per psyckes 5/2018 at [facility], past hx of aggression towards staff and peers, no documented hx of SA, pertinent PMH includes HLD, HTN, Parkinson's dx. Seizure d/o, T2DM, Hypothyroidism, Glaucoma, PVD, Asthma, Bladder Ca, BIB EMS activated by sister for agitation and worsening hallucination. Pt was medically seen found to have a UTI  exam, she was medically cleared and psych was consulted for psychosis.    On exam today, patient is less disorganized, still internally preoccupied but able to relate that her feet itch and she has had a fungal rash in the past. On exam, feet flaky with minimal red areas. Per staff, patient has been eating well. Taking PO meds.   Plan: Will continue CO. Will add nystatin powder, clotrimazole/betamethasone cream already added by SAUL. Will continue rest of the current medication management. Primary team to consider discontinuing Cogentin as patient is no longer on Prolixin.

## 2021-10-30 PROCEDURE — 99231 SBSQ HOSP IP/OBS SF/LOW 25: CPT

## 2021-10-30 RX ADMIN — Medication 50 MILLIGRAM(S): at 21:02

## 2021-10-30 RX ADMIN — Medication 1 MILLIGRAM(S): at 21:02

## 2021-10-30 RX ADMIN — Medication 25 MILLIGRAM(S): at 08:18

## 2021-10-30 RX ADMIN — CLOTRIMAZOLE AND BETAMETHASONE DIPROPIONATE 1 APPLICATION(S): 10; .5 CREAM TOPICAL at 14:28

## 2021-10-30 RX ADMIN — GABAPENTIN 100 MILLIGRAM(S): 400 CAPSULE ORAL at 21:01

## 2021-10-30 RX ADMIN — CARBIDOPA AND LEVODOPA 1 TABLET(S): 25; 100 TABLET ORAL at 14:26

## 2021-10-30 RX ADMIN — GABAPENTIN 100 MILLIGRAM(S): 400 CAPSULE ORAL at 14:26

## 2021-10-30 RX ADMIN — POLYETHYLENE GLYCOL 3350 8.5 GRAM(S): 17 POWDER, FOR SOLUTION ORAL at 08:20

## 2021-10-30 RX ADMIN — LISINOPRIL 10 MILLIGRAM(S): 2.5 TABLET ORAL at 08:18

## 2021-10-30 RX ADMIN — CLOTRIMAZOLE AND BETAMETHASONE DIPROPIONATE 1 APPLICATION(S): 10; .5 CREAM TOPICAL at 21:40

## 2021-10-30 RX ADMIN — CLOPIDOGREL BISULFATE 75 MILLIGRAM(S): 75 TABLET, FILM COATED ORAL at 08:18

## 2021-10-30 RX ADMIN — ARIPIPRAZOLE 30 MILLIGRAM(S): 15 TABLET ORAL at 08:18

## 2021-10-30 RX ADMIN — LATANOPROST 1 DROP(S): 0.05 SOLUTION/ DROPS OPHTHALMIC; TOPICAL at 21:40

## 2021-10-30 RX ADMIN — SENNA PLUS 2 TABLET(S): 8.6 TABLET ORAL at 21:02

## 2021-10-30 RX ADMIN — METFORMIN HYDROCHLORIDE 500 MILLIGRAM(S): 850 TABLET ORAL at 08:18

## 2021-10-30 RX ADMIN — Medication 25 MILLIGRAM(S): at 21:01

## 2021-10-30 RX ADMIN — Medication 1 MILLIGRAM(S): at 08:18

## 2021-10-30 RX ADMIN — GABAPENTIN 100 MILLIGRAM(S): 400 CAPSULE ORAL at 08:18

## 2021-10-30 RX ADMIN — CARBIDOPA AND LEVODOPA 1 TABLET(S): 25; 100 TABLET ORAL at 21:02

## 2021-10-30 RX ADMIN — Medication 75 MICROGRAM(S): at 06:11

## 2021-10-30 RX ADMIN — CARBIDOPA AND LEVODOPA 1 TABLET(S): 25; 100 TABLET ORAL at 08:18

## 2021-10-30 RX ADMIN — Medication 1 TABLET(S): at 08:18

## 2021-10-30 RX ADMIN — DIVALPROEX SODIUM 750 MILLIGRAM(S): 500 TABLET, DELAYED RELEASE ORAL at 21:01

## 2021-10-30 NOTE — BH INPATIENT PSYCHIATRY PROGRESS NOTE - CURRENT MEDICATION
MEDICATIONS  (STANDING):  ARIPiprazole 30 milliGRAM(s) Oral daily  benztropine 1 milliGRAM(s) Oral two times a day  carbidopa/levodopa  25/100 1 Tablet(s) Oral three times a day  clopidogrel Tablet 75 milliGRAM(s) Oral daily  clotrimazole/betamethasone Cream 1 Application(s) Topical daily  clotrimazole/betamethasone Cream 1 Application(s) Topical two times a day  diVALproex Sprinkle 750 milliGRAM(s) Oral at bedtime  gabapentin 100 milliGRAM(s) Oral three times a day  latanoprost 0.005% Ophthalmic Solution 1 Drop(s) Both EYES at bedtime  levothyroxine 75 MICROGram(s) Oral <User Schedule>  lisinopril 10 milliGRAM(s) Oral daily  metFORMIN 500 milliGRAM(s) Oral daily  metoprolol tartrate 25 milliGRAM(s) Oral two times a day  multivitamin/minerals 1 Tablet(s) Oral daily  polyethylene glycol 3350 8.5 Gram(s) Oral daily  senna 2 Tablet(s) Oral at bedtime  traZODone 50 milliGRAM(s) Oral at bedtime    MEDICATIONS  (PRN):  ammonium lactate 12% Lotion 1 Application(s) Topical two times a day PRN dry skin  hemorrhoidal Ointment 1 Application(s) Rectal every 6 hours PRN hemorrhoid discomfort  lactulose Syrup 10 Gram(s) Oral at bedtime PRN constipation  nystatin Powder 1 Application(s) Topical two times a day PRN Fungal rash  OLANZapine Injectable 2.5 milliGRAM(s) IntraMuscular once PRN severe agitation  traZODone 25 milliGRAM(s) Oral at bedtime PRN insomnia

## 2021-10-30 NOTE — BH INPATIENT PSYCHIATRY PROGRESS NOTE - NSBHCHARTREVIEWVS_PSY_A_CORE FT
Vital Signs Last 24 Hrs  T(C): 36.6 (10-30-21 @ 05:03), Max: 36.9 (10-29-21 @ 15:47)  T(F): 97.8 (10-30-21 @ 05:03), Max: 98.5 (10-29-21 @ 15:47)  HR: 81 (10-29-21 @ 20:16) (81 - 81)  BP: 136/73 (10-29-21 @ 20:16) (136/73 - 136/73)  BP(mean): --  RR: 18 (10-30-21 @ 05:03) (18 - 18)  SpO2: --    Orthostatic VS  10-30-21 @ 05:03  Lying BP: --/-- HR: --  Sitting BP: 125/78 HR: 81  Standing BP: 119/76 HR: 89  Site: upper left arm  Mode: electronic  Orthostatic VS  10-29-21 @ 07:39  Lying BP: --/-- HR: --  Sitting BP: 137/95 HR: 80  Standing BP: 127/89 HR: 84  Site: --  Mode: --  Orthostatic VS  10-28-21 @ 21:00  Lying BP: --/-- HR: --  Sitting BP: 132/85 HR: 90  Standing BP: --/-- HR: --  Site: --  Mode: --

## 2021-10-30 NOTE — BH INPATIENT PSYCHIATRY PROGRESS NOTE - NSBHMETABOLIC_PSY_ALL_CORE_FT
BMI: BMI (kg/m2): 25.2 (10-24-21 @ 16:01)  HbA1c: A1C with Estimated Average Glucose Result: 5.6 % (10-15-21 @ 11:00)    Glucose: POCT Blood Glucose.: 61 mg/dL (10-19-21 @ 11:59)    BP: 136/73 (10-29-21 @ 20:16) (129/84 - 136/73)  Lipid Panel: Date/Time: 10-14-21 @ 09:38  Cholesterol, Serum: 159  Direct LDL: --  HDL Cholesterol, Serum: 66  Total Cholesterol/HDL Ration Measurement: --  Triglycerides, Serum: 100

## 2021-10-31 PROCEDURE — 99231 SBSQ HOSP IP/OBS SF/LOW 25: CPT

## 2021-10-31 RX ADMIN — Medication 25 MILLIGRAM(S): at 22:59

## 2021-10-31 RX ADMIN — GABAPENTIN 100 MILLIGRAM(S): 400 CAPSULE ORAL at 20:22

## 2021-10-31 RX ADMIN — Medication 1 MILLIGRAM(S): at 20:21

## 2021-10-31 RX ADMIN — Medication 50 MILLIGRAM(S): at 20:22

## 2021-10-31 RX ADMIN — GABAPENTIN 100 MILLIGRAM(S): 400 CAPSULE ORAL at 08:18

## 2021-10-31 RX ADMIN — Medication 0.5 MILLIGRAM(S): at 13:38

## 2021-10-31 RX ADMIN — CLOPIDOGREL BISULFATE 75 MILLIGRAM(S): 75 TABLET, FILM COATED ORAL at 08:18

## 2021-10-31 RX ADMIN — Medication 1 MILLIGRAM(S): at 08:18

## 2021-10-31 RX ADMIN — GABAPENTIN 100 MILLIGRAM(S): 400 CAPSULE ORAL at 13:38

## 2021-10-31 RX ADMIN — LATANOPROST 1 DROP(S): 0.05 SOLUTION/ DROPS OPHTHALMIC; TOPICAL at 21:37

## 2021-10-31 RX ADMIN — Medication 1 TABLET(S): at 08:18

## 2021-10-31 RX ADMIN — CLOTRIMAZOLE AND BETAMETHASONE DIPROPIONATE 1 APPLICATION(S): 10; .5 CREAM TOPICAL at 21:38

## 2021-10-31 RX ADMIN — ARIPIPRAZOLE 30 MILLIGRAM(S): 15 TABLET ORAL at 08:18

## 2021-10-31 RX ADMIN — CARBIDOPA AND LEVODOPA 1 TABLET(S): 25; 100 TABLET ORAL at 13:40

## 2021-10-31 RX ADMIN — Medication 25 MILLIGRAM(S): at 08:18

## 2021-10-31 RX ADMIN — Medication 25 MILLIGRAM(S): at 20:21

## 2021-10-31 RX ADMIN — Medication 1 MILLIGRAM(S): at 20:23

## 2021-10-31 RX ADMIN — POLYETHYLENE GLYCOL 3350 8.5 GRAM(S): 17 POWDER, FOR SOLUTION ORAL at 08:23

## 2021-10-31 RX ADMIN — DIVALPROEX SODIUM 750 MILLIGRAM(S): 500 TABLET, DELAYED RELEASE ORAL at 20:22

## 2021-10-31 RX ADMIN — METFORMIN HYDROCHLORIDE 500 MILLIGRAM(S): 850 TABLET ORAL at 08:19

## 2021-10-31 RX ADMIN — CLOTRIMAZOLE AND BETAMETHASONE DIPROPIONATE 1 APPLICATION(S): 10; .5 CREAM TOPICAL at 08:26

## 2021-10-31 RX ADMIN — Medication 0.5 MILLIGRAM(S): at 22:59

## 2021-10-31 RX ADMIN — SENNA PLUS 2 TABLET(S): 8.6 TABLET ORAL at 20:23

## 2021-10-31 RX ADMIN — CARBIDOPA AND LEVODOPA 1 TABLET(S): 25; 100 TABLET ORAL at 20:22

## 2021-10-31 RX ADMIN — Medication 75 MICROGRAM(S): at 06:20

## 2021-10-31 RX ADMIN — LISINOPRIL 10 MILLIGRAM(S): 2.5 TABLET ORAL at 08:19

## 2021-10-31 RX ADMIN — CARBIDOPA AND LEVODOPA 1 TABLET(S): 25; 100 TABLET ORAL at 08:19

## 2021-10-31 NOTE — BH INPATIENT PSYCHIATRY PROGRESS NOTE - NSBHMETABOLIC_PSY_ALL_CORE_FT
BMI: BMI (kg/m2): 25.2 (10-24-21 @ 16:01)  HbA1c: A1C with Estimated Average Glucose Result: 5.6 % (10-15-21 @ 11:00)    Glucose: POCT Blood Glucose.: 61 mg/dL (10-19-21 @ 11:59)    BP: 136/73 (10-29-21 @ 20:16) (136/73 - 136/73)  Lipid Panel: Date/Time: 10-14-21 @ 09:38  Cholesterol, Serum: 159  Direct LDL: --  HDL Cholesterol, Serum: 66  Total Cholesterol/HDL Ration Measurement: --  Triglycerides, Serum: 100

## 2021-10-31 NOTE — BH INPATIENT PSYCHIATRY PROGRESS NOTE - NSBHFUPINTERVALCCFT_PSY_A_CORE
The pt. related that she is growing her hair faster now. She is loud and agitated and has needed PRN medication.

## 2021-10-31 NOTE — BH INPATIENT PSYCHIATRY PROGRESS NOTE - CURRENT MEDICATION
MEDICATIONS  (STANDING):  ARIPiprazole 30 milliGRAM(s) Oral daily  benztropine 1 milliGRAM(s) Oral two times a day  carbidopa/levodopa  25/100 1 Tablet(s) Oral three times a day  clopidogrel Tablet 75 milliGRAM(s) Oral daily  clotrimazole/betamethasone Cream 1 Application(s) Topical daily  clotrimazole/betamethasone Cream 1 Application(s) Topical two times a day  diVALproex Sprinkle 750 milliGRAM(s) Oral at bedtime  gabapentin 100 milliGRAM(s) Oral three times a day  latanoprost 0.005% Ophthalmic Solution 1 Drop(s) Both EYES at bedtime  levothyroxine 75 MICROGram(s) Oral <User Schedule>  lisinopril 10 milliGRAM(s) Oral daily  LORazepam     Tablet 1 milliGRAM(s) Oral two times a day  metFORMIN 500 milliGRAM(s) Oral daily  metoprolol tartrate 25 milliGRAM(s) Oral two times a day  multivitamin/minerals 1 Tablet(s) Oral daily  polyethylene glycol 3350 8.5 Gram(s) Oral daily  senna 2 Tablet(s) Oral at bedtime  traZODone 50 milliGRAM(s) Oral at bedtime    MEDICATIONS  (PRN):  ammonium lactate 12% Lotion 1 Application(s) Topical two times a day PRN dry skin  hemorrhoidal Ointment 1 Application(s) Rectal every 6 hours PRN hemorrhoid discomfort  lactulose Syrup 10 Gram(s) Oral at bedtime PRN constipation  LORazepam     Tablet 0.5 milliGRAM(s) Oral every 6 hours PRN anxiety  nystatin Powder 1 Application(s) Topical two times a day PRN Fungal rash  OLANZapine Injectable 2.5 milliGRAM(s) IntraMuscular once PRN severe agitation  traZODone 25 milliGRAM(s) Oral at bedtime PRN insomnia

## 2021-10-31 NOTE — BH INPATIENT PSYCHIATRY PROGRESS NOTE - NSBHCHARTREVIEWVS_PSY_A_CORE FT
Vital Signs Last 24 Hrs  T(C): 36.9 (10-30-21 @ 15:36), Max: 36.9 (10-30-21 @ 15:36)  T(F): 98.4 (10-30-21 @ 15:36), Max: 98.4 (10-30-21 @ 15:36)  HR: --  BP: --  BP(mean): --  RR: --  SpO2: --    Orthostatic VS  10-30-21 @ 20:31  Lying BP: --/-- HR: --  Sitting BP: 146/90 HR: 82  Standing BP: --/-- HR: --  Site: --  Mode: --  Orthostatic VS  10-30-21 @ 05:03  Lying BP: --/-- HR: --  Sitting BP: 125/78 HR: 81  Standing BP: 119/76 HR: 89  Site: upper left arm  Mode: electronic

## 2021-11-01 PROCEDURE — 99232 SBSQ HOSP IP/OBS MODERATE 35: CPT

## 2021-11-01 RX ADMIN — CARBIDOPA AND LEVODOPA 1 TABLET(S): 25; 100 TABLET ORAL at 13:48

## 2021-11-01 RX ADMIN — GABAPENTIN 100 MILLIGRAM(S): 400 CAPSULE ORAL at 13:49

## 2021-11-01 RX ADMIN — Medication 75 MICROGRAM(S): at 06:20

## 2021-11-01 RX ADMIN — Medication 1 MILLIGRAM(S): at 21:30

## 2021-11-01 RX ADMIN — GABAPENTIN 100 MILLIGRAM(S): 400 CAPSULE ORAL at 21:26

## 2021-11-01 NOTE — BH INPATIENT PSYCHIATRY PROGRESS NOTE - NSBHCHARTREVIEWVS_PSY_A_CORE FT
Vital Signs Last 24 Hrs  T(C): 37.2 (10-31-21 @ 15:30), Max: 37.2 (10-31-21 @ 15:30)  T(F): 99 (10-31-21 @ 15:30), Max: 99 (10-31-21 @ 15:30)  HR: --  BP: --  BP(mean): --  RR: --  SpO2: --    Orthostatic VS  10-31-21 @ 20:29  Lying BP: --/-- HR: --  Sitting BP: 135/80 HR: 85  Standing BP: --/-- HR: --  Site: --  Mode: --  Orthostatic VS  10-30-21 @ 20:31  Lying BP: --/-- HR: --  Sitting BP: 146/90 HR: 82  Standing BP: --/-- HR: --  Site: --  Mode: --

## 2021-11-02 PROCEDURE — 99232 SBSQ HOSP IP/OBS MODERATE 35: CPT

## 2021-11-02 RX ADMIN — CLOPIDOGREL BISULFATE 75 MILLIGRAM(S): 75 TABLET, FILM COATED ORAL at 09:22

## 2021-11-02 RX ADMIN — Medication 1 MILLIGRAM(S): at 20:12

## 2021-11-02 RX ADMIN — Medication 50 MILLIGRAM(S): at 20:13

## 2021-11-02 RX ADMIN — LATANOPROST 1 DROP(S): 0.05 SOLUTION/ DROPS OPHTHALMIC; TOPICAL at 21:58

## 2021-11-02 RX ADMIN — DIVALPROEX SODIUM 750 MILLIGRAM(S): 500 TABLET, DELAYED RELEASE ORAL at 20:13

## 2021-11-02 RX ADMIN — GABAPENTIN 100 MILLIGRAM(S): 400 CAPSULE ORAL at 13:58

## 2021-11-02 RX ADMIN — Medication 1 MILLIGRAM(S): at 09:21

## 2021-11-02 RX ADMIN — Medication 25 MILLIGRAM(S): at 09:22

## 2021-11-02 RX ADMIN — CLOTRIMAZOLE AND BETAMETHASONE DIPROPIONATE 1 APPLICATION(S): 10; .5 CREAM TOPICAL at 17:20

## 2021-11-02 RX ADMIN — Medication 1 APPLICATION(S): at 21:59

## 2021-11-02 RX ADMIN — Medication 25 MILLIGRAM(S): at 20:14

## 2021-11-02 RX ADMIN — SENNA PLUS 2 TABLET(S): 8.6 TABLET ORAL at 20:14

## 2021-11-02 RX ADMIN — CARBIDOPA AND LEVODOPA 1 TABLET(S): 25; 100 TABLET ORAL at 09:22

## 2021-11-02 RX ADMIN — GABAPENTIN 100 MILLIGRAM(S): 400 CAPSULE ORAL at 20:12

## 2021-11-02 RX ADMIN — Medication 1 TABLET(S): at 09:22

## 2021-11-02 RX ADMIN — CLOTRIMAZOLE AND BETAMETHASONE DIPROPIONATE 1 APPLICATION(S): 10; .5 CREAM TOPICAL at 21:59

## 2021-11-02 RX ADMIN — GABAPENTIN 100 MILLIGRAM(S): 400 CAPSULE ORAL at 09:21

## 2021-11-02 RX ADMIN — CARBIDOPA AND LEVODOPA 1 TABLET(S): 25; 100 TABLET ORAL at 20:12

## 2021-11-02 RX ADMIN — CARBIDOPA AND LEVODOPA 1 TABLET(S): 25; 100 TABLET ORAL at 13:58

## 2021-11-02 RX ADMIN — METFORMIN HYDROCHLORIDE 500 MILLIGRAM(S): 850 TABLET ORAL at 09:22

## 2021-11-02 RX ADMIN — LISINOPRIL 10 MILLIGRAM(S): 2.5 TABLET ORAL at 09:21

## 2021-11-02 RX ADMIN — Medication 75 MICROGRAM(S): at 06:23

## 2021-11-02 RX ADMIN — ARIPIPRAZOLE 30 MILLIGRAM(S): 15 TABLET ORAL at 09:21

## 2021-11-02 NOTE — BH INPATIENT PSYCHIATRY PROGRESS NOTE - NSBHCHARTREVIEWVS_PSY_A_CORE FT
Vital Signs Last 24 Hrs  T(C): 36.4 (11-02-21 @ 08:16), Max: 37.1 (11-01-21 @ 15:38)  T(F): 97.5 (11-02-21 @ 08:16), Max: 98.7 (11-01-21 @ 15:38)  HR: 100 (11-02-21 @ 08:16) (100 - 100)  BP: 130/79 (11-02-21 @ 08:16) (130/79 - 130/79)  BP(mean): --  RR: --  SpO2: --    Orthostatic VS  10-31-21 @ 20:29  Lying BP: --/-- HR: --  Sitting BP: 135/80 HR: 85  Standing BP: --/-- HR: --  Site: --  Mode: --

## 2021-11-02 NOTE — BH INPATIENT PSYCHIATRY PROGRESS NOTE - NSBHMETABOLIC_PSY_ALL_CORE_FT
BMI: BMI (kg/m2): 25.2 (10-24-21 @ 16:01)  HbA1c: A1C with Estimated Average Glucose Result: 5.6 % (10-15-21 @ 11:00)    Glucose: POCT Blood Glucose.: 61 mg/dL (10-19-21 @ 11:59)    BP: 130/79 (11-02-21 @ 08:16) (130/79 - 130/79)  Lipid Panel: Date/Time: 10-14-21 @ 09:38  Cholesterol, Serum: 159  Direct LDL: --  HDL Cholesterol, Serum: 66  Total Cholesterol/HDL Ration Measurement: --  Triglycerides, Serum: 100

## 2021-11-02 NOTE — BH INPATIENT PSYCHIATRY PROGRESS NOTE - CURRENT MEDICATION
MEDICATIONS  (STANDING):  ARIPiprazole 30 milliGRAM(s) Oral daily  benztropine 1 milliGRAM(s) Oral two times a day  carbidopa/levodopa  25/100 1 Tablet(s) Oral three times a day  clopidogrel Tablet 75 milliGRAM(s) Oral daily  clotrimazole/betamethasone Cream 1 Application(s) Topical two times a day  clotrimazole/betamethasone Cream 1 Application(s) Topical daily  diVALproex Sprinkle 750 milliGRAM(s) Oral at bedtime  gabapentin 100 milliGRAM(s) Oral three times a day  latanoprost 0.005% Ophthalmic Solution 1 Drop(s) Both EYES at bedtime  levothyroxine 75 MICROGram(s) Oral <User Schedule>  lisinopril 10 milliGRAM(s) Oral daily  LORazepam     Tablet 1 milliGRAM(s) Oral two times a day  metFORMIN 500 milliGRAM(s) Oral daily  metoprolol tartrate 25 milliGRAM(s) Oral two times a day  multivitamin/minerals 1 Tablet(s) Oral daily  polyethylene glycol 3350 8.5 Gram(s) Oral daily  senna 2 Tablet(s) Oral at bedtime  traZODone 50 milliGRAM(s) Oral at bedtime    MEDICATIONS  (PRN):  ammonium lactate 12% Lotion 1 Application(s) Topical two times a day PRN dry skin  hemorrhoidal Ointment 1 Application(s) Rectal every 6 hours PRN hemorrhoid discomfort  lactulose Syrup 10 Gram(s) Oral at bedtime PRN constipation  LORazepam     Tablet 0.5 milliGRAM(s) Oral every 6 hours PRN anxiety  nystatin Powder 1 Application(s) Topical two times a day PRN Fungal rash  OLANZapine Injectable 2.5 milliGRAM(s) IntraMuscular once PRN severe agitation  traZODone 25 milliGRAM(s) Oral at bedtime PRN insomnia   MEDICATIONS  (STANDING):  ARIPiprazole 30 milliGRAM(s) Oral daily  benztropine 1 milliGRAM(s) Oral two times a day  carbidopa/levodopa  25/100 1 Tablet(s) Oral three times a day  clopidogrel Tablet 75 milliGRAM(s) Oral daily  clotrimazole/betamethasone Cream 1 Application(s) Topical daily  clotrimazole/betamethasone Cream 1 Application(s) Topical two times a day  diVALproex Sprinkle 750 milliGRAM(s) Oral at bedtime  gabapentin 100 milliGRAM(s) Oral three times a day  latanoprost 0.005% Ophthalmic Solution 1 Drop(s) Both EYES at bedtime  levothyroxine 75 MICROGram(s) Oral <User Schedule>  lisinopril 10 milliGRAM(s) Oral daily  LORazepam     Tablet 1 milliGRAM(s) Oral two times a day  metFORMIN 500 milliGRAM(s) Oral daily  metoprolol tartrate 25 milliGRAM(s) Oral two times a day  multivitamin/minerals 1 Tablet(s) Oral daily  polyethylene glycol 3350 8.5 Gram(s) Oral daily  senna 2 Tablet(s) Oral at bedtime  traZODone 50 milliGRAM(s) Oral at bedtime    MEDICATIONS  (PRN):  ammonium lactate 12% Lotion 1 Application(s) Topical two times a day PRN dry skin  hemorrhoidal Ointment 1 Application(s) Rectal every 6 hours PRN hemorrhoid discomfort  lactulose Syrup 10 Gram(s) Oral at bedtime PRN constipation  LORazepam     Tablet 0.5 milliGRAM(s) Oral every 6 hours PRN anxiety  nystatin Powder 1 Application(s) Topical two times a day PRN Fungal rash  OLANZapine Injectable 2.5 milliGRAM(s) IntraMuscular once PRN severe agitation  traZODone 25 milliGRAM(s) Oral at bedtime PRN insomnia

## 2021-11-03 PROCEDURE — 99232 SBSQ HOSP IP/OBS MODERATE 35: CPT

## 2021-11-03 RX ADMIN — CARBIDOPA AND LEVODOPA 1 TABLET(S): 25; 100 TABLET ORAL at 20:14

## 2021-11-03 RX ADMIN — Medication 25 MILLIGRAM(S): at 20:14

## 2021-11-03 RX ADMIN — LISINOPRIL 10 MILLIGRAM(S): 2.5 TABLET ORAL at 08:52

## 2021-11-03 RX ADMIN — CLOTRIMAZOLE AND BETAMETHASONE DIPROPIONATE 1 APPLICATION(S): 10; .5 CREAM TOPICAL at 09:05

## 2021-11-03 RX ADMIN — Medication 75 MICROGRAM(S): at 06:24

## 2021-11-03 RX ADMIN — CARBIDOPA AND LEVODOPA 1 TABLET(S): 25; 100 TABLET ORAL at 08:53

## 2021-11-03 RX ADMIN — CARBIDOPA AND LEVODOPA 1 TABLET(S): 25; 100 TABLET ORAL at 13:36

## 2021-11-03 RX ADMIN — Medication 1 MILLIGRAM(S): at 08:55

## 2021-11-03 RX ADMIN — CLOTRIMAZOLE AND BETAMETHASONE DIPROPIONATE 1 APPLICATION(S): 10; .5 CREAM TOPICAL at 21:34

## 2021-11-03 RX ADMIN — DIVALPROEX SODIUM 750 MILLIGRAM(S): 500 TABLET, DELAYED RELEASE ORAL at 20:12

## 2021-11-03 RX ADMIN — LATANOPROST 1 DROP(S): 0.05 SOLUTION/ DROPS OPHTHALMIC; TOPICAL at 21:34

## 2021-11-03 RX ADMIN — GABAPENTIN 100 MILLIGRAM(S): 400 CAPSULE ORAL at 13:35

## 2021-11-03 RX ADMIN — ARIPIPRAZOLE 30 MILLIGRAM(S): 15 TABLET ORAL at 08:52

## 2021-11-03 RX ADMIN — METFORMIN HYDROCHLORIDE 500 MILLIGRAM(S): 850 TABLET ORAL at 08:55

## 2021-11-03 RX ADMIN — Medication 50 MILLIGRAM(S): at 20:13

## 2021-11-03 RX ADMIN — GABAPENTIN 100 MILLIGRAM(S): 400 CAPSULE ORAL at 08:55

## 2021-11-03 RX ADMIN — Medication 1 APPLICATION(S): at 21:35

## 2021-11-03 RX ADMIN — Medication 1 MILLIGRAM(S): at 20:14

## 2021-11-03 RX ADMIN — Medication 1 MILLIGRAM(S): at 20:13

## 2021-11-03 RX ADMIN — Medication 25 MILLIGRAM(S): at 08:54

## 2021-11-03 RX ADMIN — SENNA PLUS 2 TABLET(S): 8.6 TABLET ORAL at 21:23

## 2021-11-03 RX ADMIN — GABAPENTIN 100 MILLIGRAM(S): 400 CAPSULE ORAL at 20:13

## 2021-11-03 RX ADMIN — Medication 1 TABLET(S): at 08:52

## 2021-11-03 RX ADMIN — Medication 1 MILLIGRAM(S): at 08:52

## 2021-11-03 RX ADMIN — CLOPIDOGREL BISULFATE 75 MILLIGRAM(S): 75 TABLET, FILM COATED ORAL at 08:54

## 2021-11-03 NOTE — BH INPATIENT PSYCHIATRY PROGRESS NOTE - NSBHMETABOLIC_PSY_ALL_CORE_FT
BMI: BMI (kg/m2): 25.2 (10-24-21 @ 16:01)  HbA1c: A1C with Estimated Average Glucose Result: 5.6 % (10-15-21 @ 11:00)    Glucose: POCT Blood Glucose.: 61 mg/dL (10-19-21 @ 11:59)    BP: 154/90 (11-03-21 @ 08:03) (130/79 - 154/90)  Lipid Panel: Date/Time: 10-14-21 @ 09:38  Cholesterol, Serum: 159  Direct LDL: --  HDL Cholesterol, Serum: 66  Total Cholesterol/HDL Ration Measurement: --  Triglycerides, Serum: 100

## 2021-11-03 NOTE — BH INPATIENT PSYCHIATRY PROGRESS NOTE - NSBHFUPINTERVALHXFT_PSY_A_CORE
Pt. remains disorganized and psychotic.  Spoke with pt's family who agree that she is slightly improved.

## 2021-11-03 NOTE — BH INPATIENT PSYCHIATRY PROGRESS NOTE - NSBHCHARTREVIEWVS_PSY_A_CORE FT
Vital Signs Last 24 Hrs  T(C): 36.7 (11-03-21 @ 08:03), Max: 36.7 (11-03-21 @ 08:03)  T(F): 98 (11-03-21 @ 08:03), Max: 98 (11-03-21 @ 08:03)  HR: 87 (11-03-21 @ 08:03) (87 - 87)  BP: 154/90 (11-03-21 @ 08:03) (154/90 - 154/90)  BP(mean): --  RR: 18 (11-03-21 @ 08:03) (18 - 18)  SpO2: --    Orthostatic VS  11-02-21 @ 20:29  Lying BP: --/-- HR: --  Sitting BP: 124/83 HR: 96  Standing BP: --/-- HR: --  Site: --  Mode: --

## 2021-11-04 PROCEDURE — 99232 SBSQ HOSP IP/OBS MODERATE 35: CPT

## 2021-11-04 RX ADMIN — LATANOPROST 1 DROP(S): 0.05 SOLUTION/ DROPS OPHTHALMIC; TOPICAL at 21:00

## 2021-11-04 RX ADMIN — Medication 1 MILLIGRAM(S): at 20:30

## 2021-11-04 RX ADMIN — ARIPIPRAZOLE 30 MILLIGRAM(S): 15 TABLET ORAL at 09:14

## 2021-11-04 RX ADMIN — Medication 1 MILLIGRAM(S): at 09:15

## 2021-11-04 RX ADMIN — CLOTRIMAZOLE AND BETAMETHASONE DIPROPIONATE 1 APPLICATION(S): 10; .5 CREAM TOPICAL at 20:59

## 2021-11-04 RX ADMIN — Medication 50 MILLIGRAM(S): at 20:29

## 2021-11-04 RX ADMIN — CARBIDOPA AND LEVODOPA 1 TABLET(S): 25; 100 TABLET ORAL at 13:58

## 2021-11-04 RX ADMIN — GABAPENTIN 100 MILLIGRAM(S): 400 CAPSULE ORAL at 09:15

## 2021-11-04 RX ADMIN — CARBIDOPA AND LEVODOPA 1 TABLET(S): 25; 100 TABLET ORAL at 09:15

## 2021-11-04 RX ADMIN — Medication 1 TABLET(S): at 09:14

## 2021-11-04 RX ADMIN — CLOTRIMAZOLE AND BETAMETHASONE DIPROPIONATE 1 APPLICATION(S): 10; .5 CREAM TOPICAL at 09:52

## 2021-11-04 RX ADMIN — METFORMIN HYDROCHLORIDE 500 MILLIGRAM(S): 850 TABLET ORAL at 09:15

## 2021-11-04 RX ADMIN — CARBIDOPA AND LEVODOPA 1 TABLET(S): 25; 100 TABLET ORAL at 20:30

## 2021-11-04 RX ADMIN — LISINOPRIL 10 MILLIGRAM(S): 2.5 TABLET ORAL at 09:15

## 2021-11-04 RX ADMIN — GABAPENTIN 100 MILLIGRAM(S): 400 CAPSULE ORAL at 13:58

## 2021-11-04 RX ADMIN — Medication 25 MILLIGRAM(S): at 20:29

## 2021-11-04 RX ADMIN — DIVALPROEX SODIUM 750 MILLIGRAM(S): 500 TABLET, DELAYED RELEASE ORAL at 20:30

## 2021-11-04 RX ADMIN — GABAPENTIN 100 MILLIGRAM(S): 400 CAPSULE ORAL at 20:30

## 2021-11-04 RX ADMIN — Medication 0.5 MILLIGRAM(S): at 23:41

## 2021-11-04 RX ADMIN — SENNA PLUS 2 TABLET(S): 8.6 TABLET ORAL at 20:30

## 2021-11-04 RX ADMIN — CLOPIDOGREL BISULFATE 75 MILLIGRAM(S): 75 TABLET, FILM COATED ORAL at 09:15

## 2021-11-04 RX ADMIN — Medication 1 MILLIGRAM(S): at 09:14

## 2021-11-04 RX ADMIN — Medication 25 MILLIGRAM(S): at 09:14

## 2021-11-04 NOTE — BH INPATIENT PSYCHIATRY PROGRESS NOTE - NSBHCHARTREVIEWVS_PSY_A_CORE FT
Vital Signs Last 24 Hrs  T(C): 36.5 (11-04-21 @ 05:42), Max: 36.5 (11-04-21 @ 05:42)  T(F): 97.7 (11-04-21 @ 05:42), Max: 97.7 (11-04-21 @ 05:42)  HR: --  BP: --  BP(mean): --  RR: --  SpO2: --    Orthostatic VS  11-04-21 @ 05:42  Lying BP: --/-- HR: --  Sitting BP: 132/88 HR: 85  Standing BP: --/-- HR: --  Site: --  Mode: --  Orthostatic VS  11-02-21 @ 20:29  Lying BP: --/-- HR: --  Sitting BP: 124/83 HR: 96  Standing BP: --/-- HR: --  Site: --  Mode: --

## 2021-11-05 PROCEDURE — 99232 SBSQ HOSP IP/OBS MODERATE 35: CPT

## 2021-11-05 RX ORDER — QUETIAPINE FUMARATE 200 MG/1
100 TABLET, FILM COATED ORAL AT BEDTIME
Refills: 0 | Status: DISCONTINUED | OUTPATIENT
Start: 2021-11-05 | End: 2021-11-08

## 2021-11-05 RX ORDER — ARIPIPRAZOLE 15 MG/1
15 TABLET ORAL DAILY
Refills: 0 | Status: DISCONTINUED | OUTPATIENT
Start: 2021-11-05 | End: 2021-11-08

## 2021-11-05 RX ADMIN — METFORMIN HYDROCHLORIDE 500 MILLIGRAM(S): 850 TABLET ORAL at 09:25

## 2021-11-05 RX ADMIN — CARBIDOPA AND LEVODOPA 1 TABLET(S): 25; 100 TABLET ORAL at 09:25

## 2021-11-05 RX ADMIN — Medication 1 MILLIGRAM(S): at 20:30

## 2021-11-05 RX ADMIN — ARIPIPRAZOLE 15 MILLIGRAM(S): 15 TABLET ORAL at 09:24

## 2021-11-05 RX ADMIN — Medication 50 MILLIGRAM(S): at 20:31

## 2021-11-05 RX ADMIN — Medication 1 MILLIGRAM(S): at 14:07

## 2021-11-05 RX ADMIN — Medication 1 TABLET(S): at 09:25

## 2021-11-05 RX ADMIN — DIVALPROEX SODIUM 750 MILLIGRAM(S): 500 TABLET, DELAYED RELEASE ORAL at 20:31

## 2021-11-05 RX ADMIN — GABAPENTIN 100 MILLIGRAM(S): 400 CAPSULE ORAL at 14:07

## 2021-11-05 RX ADMIN — GABAPENTIN 100 MILLIGRAM(S): 400 CAPSULE ORAL at 20:30

## 2021-11-05 RX ADMIN — CLOTRIMAZOLE AND BETAMETHASONE DIPROPIONATE 1 APPLICATION(S): 10; .5 CREAM TOPICAL at 20:54

## 2021-11-05 RX ADMIN — CARBIDOPA AND LEVODOPA 1 TABLET(S): 25; 100 TABLET ORAL at 20:30

## 2021-11-05 RX ADMIN — GABAPENTIN 100 MILLIGRAM(S): 400 CAPSULE ORAL at 09:25

## 2021-11-05 RX ADMIN — CARBIDOPA AND LEVODOPA 1 TABLET(S): 25; 100 TABLET ORAL at 14:07

## 2021-11-05 RX ADMIN — Medication 25 MILLIGRAM(S): at 20:30

## 2021-11-05 RX ADMIN — Medication 25 MILLIGRAM(S): at 09:24

## 2021-11-05 RX ADMIN — CLOTRIMAZOLE AND BETAMETHASONE DIPROPIONATE 1 APPLICATION(S): 10; .5 CREAM TOPICAL at 09:49

## 2021-11-05 RX ADMIN — Medication 1 MILLIGRAM(S): at 09:25

## 2021-11-05 RX ADMIN — CLOPIDOGREL BISULFATE 75 MILLIGRAM(S): 75 TABLET, FILM COATED ORAL at 09:24

## 2021-11-05 RX ADMIN — LATANOPROST 1 DROP(S): 0.05 SOLUTION/ DROPS OPHTHALMIC; TOPICAL at 20:38

## 2021-11-05 RX ADMIN — LISINOPRIL 10 MILLIGRAM(S): 2.5 TABLET ORAL at 09:25

## 2021-11-05 RX ADMIN — Medication 75 MICROGRAM(S): at 05:28

## 2021-11-05 RX ADMIN — QUETIAPINE FUMARATE 100 MILLIGRAM(S): 200 TABLET, FILM COATED ORAL at 20:30

## 2021-11-05 NOTE — BH INPATIENT PSYCHIATRY PROGRESS NOTE - NSBHCHARTREVIEWVS_PSY_A_CORE FT
Vital Signs Last 24 Hrs  T(C): 36.2 (11-05-21 @ 05:51), Max: 36.2 (11-05-21 @ 05:51)  T(F): 97.2 (11-05-21 @ 05:51), Max: 97.2 (11-05-21 @ 05:51)  HR: --  BP: --  BP(mean): --  RR: --  SpO2: --    Orthostatic VS  11-05-21 @ 05:51  Lying BP: --/-- HR: --  Sitting BP: 141/94 HR: 76  Standing BP: 143/91 HR: 88  Site: --  Mode: --  Orthostatic VS  11-04-21 @ 20:12  Lying BP: --/-- HR: --  Sitting BP: 132/73 HR: 94  Standing BP: --/-- HR: --  Site: upper left arm  Mode: electronic  Orthostatic VS  11-04-21 @ 05:42  Lying BP: --/-- HR: --  Sitting BP: 132/88 HR: 85  Standing BP: --/-- HR: --  Site: --  Mode: --

## 2021-11-05 NOTE — BH INPATIENT PSYCHIATRY PROGRESS NOTE - CURRENT MEDICATION
MEDICATIONS  (STANDING):  ARIPiprazole 15 milliGRAM(s) Oral daily  benztropine 1 milliGRAM(s) Oral two times a day  carbidopa/levodopa  25/100 1 Tablet(s) Oral three times a day  clopidogrel Tablet 75 milliGRAM(s) Oral daily  clotrimazole/betamethasone Cream 1 Application(s) Topical two times a day  diVALproex Sprinkle 750 milliGRAM(s) Oral at bedtime  gabapentin 100 milliGRAM(s) Oral three times a day  latanoprost 0.005% Ophthalmic Solution 1 Drop(s) Both EYES at bedtime  levothyroxine 75 MICROGram(s) Oral <User Schedule>  lisinopril 10 milliGRAM(s) Oral daily  LORazepam     Tablet 1 milliGRAM(s) Oral two times a day  metFORMIN 500 milliGRAM(s) Oral daily  metoprolol tartrate 25 milliGRAM(s) Oral two times a day  multivitamin/minerals 1 Tablet(s) Oral daily  polyethylene glycol 3350 8.5 Gram(s) Oral daily  QUEtiapine 100 milliGRAM(s) Oral at bedtime  senna 2 Tablet(s) Oral at bedtime  traZODone 50 milliGRAM(s) Oral at bedtime    MEDICATIONS  (PRN):  ammonium lactate 12% Lotion 1 Application(s) Topical two times a day PRN dry skin  hemorrhoidal Ointment 1 Application(s) Rectal every 6 hours PRN hemorrhoid discomfort  lactulose Syrup 10 Gram(s) Oral at bedtime PRN constipation  LORazepam     Tablet 0.5 milliGRAM(s) Oral every 6 hours PRN anxiety  nystatin Powder 1 Application(s) Topical two times a day PRN Fungal rash  OLANZapine Injectable 2.5 milliGRAM(s) IntraMuscular once PRN severe agitation  traZODone 25 milliGRAM(s) Oral at bedtime PRN insomnia

## 2021-11-05 NOTE — BH INPATIENT PSYCHIATRY PROGRESS NOTE - NSBHMETABOLIC_PSY_ALL_CORE_FT
BMI: BMI (kg/m2): 25.2 (10-24-21 @ 16:01)  HbA1c: A1C with Estimated Average Glucose Result: 5.6 % (10-15-21 @ 11:00)    Glucose: POCT Blood Glucose.: 61 mg/dL (10-19-21 @ 11:59)    BP: 154/90 (11-03-21 @ 08:03) (154/90 - 154/90)  Lipid Panel: Date/Time: 10-14-21 @ 09:38  Cholesterol, Serum: 159  Direct LDL: --  HDL Cholesterol, Serum: 66  Total Cholesterol/HDL Ration Measurement: --  Triglycerides, Serum: 100

## 2021-11-05 NOTE — BH INPATIENT PSYCHIATRY PROGRESS NOTE - NSBHFUPINTERVALCCFT_PSY_A_CORE
The pt. stated that she is doing fine and then walked away and was hearing voices and cursing at them.

## 2021-11-06 RX ADMIN — GABAPENTIN 100 MILLIGRAM(S): 400 CAPSULE ORAL at 20:13

## 2021-11-06 RX ADMIN — Medication 25 MILLIGRAM(S): at 10:28

## 2021-11-06 RX ADMIN — METFORMIN HYDROCHLORIDE 500 MILLIGRAM(S): 850 TABLET ORAL at 10:28

## 2021-11-06 RX ADMIN — LISINOPRIL 10 MILLIGRAM(S): 2.5 TABLET ORAL at 10:28

## 2021-11-06 RX ADMIN — SENNA PLUS 2 TABLET(S): 8.6 TABLET ORAL at 20:13

## 2021-11-06 RX ADMIN — GABAPENTIN 100 MILLIGRAM(S): 400 CAPSULE ORAL at 10:28

## 2021-11-06 RX ADMIN — Medication 1 TABLET(S): at 10:28

## 2021-11-06 RX ADMIN — QUETIAPINE FUMARATE 100 MILLIGRAM(S): 200 TABLET, FILM COATED ORAL at 20:12

## 2021-11-06 RX ADMIN — Medication 1 MILLIGRAM(S): at 10:28

## 2021-11-06 RX ADMIN — CARBIDOPA AND LEVODOPA 1 TABLET(S): 25; 100 TABLET ORAL at 10:28

## 2021-11-06 RX ADMIN — DIVALPROEX SODIUM 750 MILLIGRAM(S): 500 TABLET, DELAYED RELEASE ORAL at 20:12

## 2021-11-06 RX ADMIN — Medication 1 MILLIGRAM(S): at 20:13

## 2021-11-06 RX ADMIN — CARBIDOPA AND LEVODOPA 1 TABLET(S): 25; 100 TABLET ORAL at 13:54

## 2021-11-06 RX ADMIN — Medication 1 MILLIGRAM(S): at 22:05

## 2021-11-06 RX ADMIN — Medication 50 MILLIGRAM(S): at 20:13

## 2021-11-06 RX ADMIN — ARIPIPRAZOLE 15 MILLIGRAM(S): 15 TABLET ORAL at 10:27

## 2021-11-06 RX ADMIN — Medication 1 MILLIGRAM(S): at 10:27

## 2021-11-06 RX ADMIN — Medication 1 MILLIGRAM(S): at 13:53

## 2021-11-06 RX ADMIN — CLOPIDOGREL BISULFATE 75 MILLIGRAM(S): 75 TABLET, FILM COATED ORAL at 10:28

## 2021-11-06 RX ADMIN — CARBIDOPA AND LEVODOPA 1 TABLET(S): 25; 100 TABLET ORAL at 20:13

## 2021-11-06 RX ADMIN — Medication 75 MICROGRAM(S): at 05:10

## 2021-11-06 RX ADMIN — Medication 25 MILLIGRAM(S): at 20:12

## 2021-11-06 RX ADMIN — GABAPENTIN 100 MILLIGRAM(S): 400 CAPSULE ORAL at 13:53

## 2021-11-06 RX ADMIN — CLOTRIMAZOLE AND BETAMETHASONE DIPROPIONATE 1 APPLICATION(S): 10; .5 CREAM TOPICAL at 10:28

## 2021-11-07 RX ADMIN — Medication 1 MILLIGRAM(S): at 20:40

## 2021-11-07 RX ADMIN — SENNA PLUS 2 TABLET(S): 8.6 TABLET ORAL at 20:40

## 2021-11-07 RX ADMIN — DIVALPROEX SODIUM 750 MILLIGRAM(S): 500 TABLET, DELAYED RELEASE ORAL at 20:39

## 2021-11-07 RX ADMIN — CARBIDOPA AND LEVODOPA 1 TABLET(S): 25; 100 TABLET ORAL at 12:20

## 2021-11-07 RX ADMIN — Medication 1 MILLIGRAM(S): at 12:20

## 2021-11-07 RX ADMIN — Medication 1 MILLIGRAM(S): at 08:28

## 2021-11-07 RX ADMIN — CLOPIDOGREL BISULFATE 75 MILLIGRAM(S): 75 TABLET, FILM COATED ORAL at 08:30

## 2021-11-07 RX ADMIN — CARBIDOPA AND LEVODOPA 1 TABLET(S): 25; 100 TABLET ORAL at 20:40

## 2021-11-07 RX ADMIN — POLYETHYLENE GLYCOL 3350 8.5 GRAM(S): 17 POWDER, FOR SOLUTION ORAL at 08:33

## 2021-11-07 RX ADMIN — CARBIDOPA AND LEVODOPA 1 TABLET(S): 25; 100 TABLET ORAL at 08:28

## 2021-11-07 RX ADMIN — Medication 50 MILLIGRAM(S): at 20:40

## 2021-11-07 RX ADMIN — GABAPENTIN 100 MILLIGRAM(S): 400 CAPSULE ORAL at 20:40

## 2021-11-07 RX ADMIN — Medication 25 MILLIGRAM(S): at 20:40

## 2021-11-07 RX ADMIN — Medication 1 MILLIGRAM(S): at 08:31

## 2021-11-07 RX ADMIN — GABAPENTIN 100 MILLIGRAM(S): 400 CAPSULE ORAL at 12:20

## 2021-11-07 RX ADMIN — Medication 1 TABLET(S): at 08:30

## 2021-11-07 RX ADMIN — GABAPENTIN 100 MILLIGRAM(S): 400 CAPSULE ORAL at 08:33

## 2021-11-07 RX ADMIN — METFORMIN HYDROCHLORIDE 500 MILLIGRAM(S): 850 TABLET ORAL at 08:30

## 2021-11-07 RX ADMIN — ARIPIPRAZOLE 15 MILLIGRAM(S): 15 TABLET ORAL at 08:29

## 2021-11-07 RX ADMIN — CLOTRIMAZOLE AND BETAMETHASONE DIPROPIONATE 1 APPLICATION(S): 10; .5 CREAM TOPICAL at 08:33

## 2021-11-07 RX ADMIN — Medication 25 MILLIGRAM(S): at 08:30

## 2021-11-07 RX ADMIN — LISINOPRIL 10 MILLIGRAM(S): 2.5 TABLET ORAL at 08:30

## 2021-11-07 RX ADMIN — Medication 1 MILLIGRAM(S): at 16:12

## 2021-11-07 RX ADMIN — QUETIAPINE FUMARATE 100 MILLIGRAM(S): 200 TABLET, FILM COATED ORAL at 20:39

## 2021-11-08 LAB
ALBUMIN SERPL ELPH-MCNC: 3.9 G/DL — SIGNIFICANT CHANGE UP (ref 3.3–5)
ALP SERPL-CCNC: 91 U/L — SIGNIFICANT CHANGE UP (ref 40–120)
ALT FLD-CCNC: 5 U/L — SIGNIFICANT CHANGE UP (ref 4–33)
ANION GAP SERPL CALC-SCNC: 9 MMOL/L — SIGNIFICANT CHANGE UP (ref 7–14)
AST SERPL-CCNC: 18 U/L — SIGNIFICANT CHANGE UP (ref 4–32)
BILIRUB SERPL-MCNC: <0.2 MG/DL — SIGNIFICANT CHANGE UP (ref 0.2–1.2)
BUN SERPL-MCNC: 20 MG/DL — SIGNIFICANT CHANGE UP (ref 7–23)
CALCIUM SERPL-MCNC: 9.3 MG/DL — SIGNIFICANT CHANGE UP (ref 8.4–10.5)
CHLORIDE SERPL-SCNC: 106 MMOL/L — SIGNIFICANT CHANGE UP (ref 98–107)
CO2 SERPL-SCNC: 26 MMOL/L — SIGNIFICANT CHANGE UP (ref 22–31)
CREAT SERPL-MCNC: 0.65 MG/DL — SIGNIFICANT CHANGE UP (ref 0.5–1.3)
GLUCOSE SERPL-MCNC: 131 MG/DL — HIGH (ref 70–99)
HCT VFR BLD CALC: 34.4 % — LOW (ref 34.5–45)
HGB BLD-MCNC: 10.8 G/DL — LOW (ref 11.5–15.5)
MCHC RBC-ENTMCNC: 27.8 PG — SIGNIFICANT CHANGE UP (ref 27–34)
MCHC RBC-ENTMCNC: 31.4 GM/DL — LOW (ref 32–36)
MCV RBC AUTO: 88.4 FL — SIGNIFICANT CHANGE UP (ref 80–100)
NRBC # BLD: 0 /100 WBCS — SIGNIFICANT CHANGE UP
NRBC # FLD: 0 K/UL — SIGNIFICANT CHANGE UP
PLATELET # BLD AUTO: 237 K/UL — SIGNIFICANT CHANGE UP (ref 150–400)
POTASSIUM SERPL-MCNC: 4.3 MMOL/L — SIGNIFICANT CHANGE UP (ref 3.5–5.3)
POTASSIUM SERPL-SCNC: 4.3 MMOL/L — SIGNIFICANT CHANGE UP (ref 3.5–5.3)
PROT SERPL-MCNC: 7.6 G/DL — SIGNIFICANT CHANGE UP (ref 6–8.3)
RBC # BLD: 3.89 M/UL — SIGNIFICANT CHANGE UP (ref 3.8–5.2)
RBC # FLD: 16.1 % — HIGH (ref 10.3–14.5)
SODIUM SERPL-SCNC: 141 MMOL/L — SIGNIFICANT CHANGE UP (ref 135–145)
WBC # BLD: 5.62 K/UL — SIGNIFICANT CHANGE UP (ref 3.8–10.5)
WBC # FLD AUTO: 5.62 K/UL — SIGNIFICANT CHANGE UP (ref 3.8–10.5)

## 2021-11-08 PROCEDURE — 99232 SBSQ HOSP IP/OBS MODERATE 35: CPT

## 2021-11-08 RX ORDER — QUETIAPINE FUMARATE 200 MG/1
100 TABLET, FILM COATED ORAL
Refills: 0 | Status: DISCONTINUED | OUTPATIENT
Start: 2021-11-08 | End: 2021-11-11

## 2021-11-08 RX ADMIN — QUETIAPINE FUMARATE 100 MILLIGRAM(S): 200 TABLET, FILM COATED ORAL at 20:31

## 2021-11-08 RX ADMIN — CLOPIDOGREL BISULFATE 75 MILLIGRAM(S): 75 TABLET, FILM COATED ORAL at 09:02

## 2021-11-08 RX ADMIN — Medication 1 MILLIGRAM(S): at 13:43

## 2021-11-08 RX ADMIN — QUETIAPINE FUMARATE 100 MILLIGRAM(S): 200 TABLET, FILM COATED ORAL at 09:05

## 2021-11-08 RX ADMIN — Medication 25 MILLIGRAM(S): at 09:03

## 2021-11-08 RX ADMIN — GABAPENTIN 100 MILLIGRAM(S): 400 CAPSULE ORAL at 13:42

## 2021-11-08 RX ADMIN — CARBIDOPA AND LEVODOPA 1 TABLET(S): 25; 100 TABLET ORAL at 20:31

## 2021-11-08 RX ADMIN — Medication 1 MILLIGRAM(S): at 09:03

## 2021-11-08 RX ADMIN — LATANOPROST 1 DROP(S): 0.05 SOLUTION/ DROPS OPHTHALMIC; TOPICAL at 20:30

## 2021-11-08 RX ADMIN — Medication 1 TABLET(S): at 09:03

## 2021-11-08 RX ADMIN — CLOTRIMAZOLE AND BETAMETHASONE DIPROPIONATE 1 APPLICATION(S): 10; .5 CREAM TOPICAL at 09:05

## 2021-11-08 RX ADMIN — SENNA PLUS 2 TABLET(S): 8.6 TABLET ORAL at 20:30

## 2021-11-08 RX ADMIN — Medication 25 MILLIGRAM(S): at 20:30

## 2021-11-08 RX ADMIN — CARBIDOPA AND LEVODOPA 1 TABLET(S): 25; 100 TABLET ORAL at 09:03

## 2021-11-08 RX ADMIN — Medication 75 MICROGRAM(S): at 06:01

## 2021-11-08 RX ADMIN — Medication 1 MILLIGRAM(S): at 09:02

## 2021-11-08 RX ADMIN — GABAPENTIN 100 MILLIGRAM(S): 400 CAPSULE ORAL at 20:30

## 2021-11-08 RX ADMIN — GABAPENTIN 100 MILLIGRAM(S): 400 CAPSULE ORAL at 09:02

## 2021-11-08 RX ADMIN — Medication 1 MILLIGRAM(S): at 20:30

## 2021-11-08 RX ADMIN — POLYETHYLENE GLYCOL 3350 8.5 GRAM(S): 17 POWDER, FOR SOLUTION ORAL at 09:06

## 2021-11-08 RX ADMIN — METFORMIN HYDROCHLORIDE 500 MILLIGRAM(S): 850 TABLET ORAL at 09:02

## 2021-11-08 RX ADMIN — DIVALPROEX SODIUM 750 MILLIGRAM(S): 500 TABLET, DELAYED RELEASE ORAL at 20:31

## 2021-11-08 RX ADMIN — LISINOPRIL 10 MILLIGRAM(S): 2.5 TABLET ORAL at 09:03

## 2021-11-08 RX ADMIN — Medication 50 MILLIGRAM(S): at 20:30

## 2021-11-08 NOTE — BH TREATMENT PLAN - NSTXDCOTHRINTERSW_PSY_ALL_CORE
SW will provide psychoeducation, support and discharge planning.  Collateral supports to be contacted accordingly.
SW will provide encouragement and support for appropriate behavior
SW will provide encouragement and support for appropriate behavior
SW will encourage compliance and provide support

## 2021-11-08 NOTE — BH INPATIENT PSYCHIATRY PROGRESS NOTE - NSBHMETABOLIC_PSY_ALL_CORE_FT
BMI: BMI (kg/m2): 25.2 (11-06-21 @ 15:58)  HbA1c: A1C with Estimated Average Glucose Result: 5.6 % (10-15-21 @ 11:00)    Glucose: POCT Blood Glucose.: 61 mg/dL (10-19-21 @ 11:59)    BP: 130/97 (11-05-21 @ 20:11) (130/97 - 130/97)  Lipid Panel: Date/Time: 10-14-21 @ 09:38  Cholesterol, Serum: 159  Direct LDL: --  HDL Cholesterol, Serum: 66  Total Cholesterol/HDL Ration Measurement: --  Triglycerides, Serum: 100

## 2021-11-08 NOTE — BH INPATIENT PSYCHIATRY PROGRESS NOTE - CURRENT MEDICATION
MEDICATIONS  (STANDING):  benztropine 1 milliGRAM(s) Oral two times a day  carbidopa/levodopa  25/100 1 Tablet(s) Oral three times a day  clopidogrel Tablet 75 milliGRAM(s) Oral daily  clotrimazole/betamethasone Cream 1 Application(s) Topical two times a day  diVALproex Sprinkle 750 milliGRAM(s) Oral at bedtime  gabapentin 100 milliGRAM(s) Oral three times a day  latanoprost 0.005% Ophthalmic Solution 1 Drop(s) Both EYES at bedtime  levothyroxine 75 MICROGram(s) Oral <User Schedule>  lisinopril 10 milliGRAM(s) Oral daily  LORazepam     Tablet 1 milliGRAM(s) Oral three times a day  metFORMIN 500 milliGRAM(s) Oral daily  metoprolol tartrate 25 milliGRAM(s) Oral two times a day  multivitamin/minerals 1 Tablet(s) Oral daily  polyethylene glycol 3350 8.5 Gram(s) Oral daily  QUEtiapine 100 milliGRAM(s) Oral two times a day  senna 2 Tablet(s) Oral at bedtime  traZODone 50 milliGRAM(s) Oral at bedtime    MEDICATIONS  (PRN):  ammonium lactate 12% Lotion 1 Application(s) Topical two times a day PRN dry skin  hemorrhoidal Ointment 1 Application(s) Rectal every 6 hours PRN hemorrhoid discomfort  lactulose Syrup 10 Gram(s) Oral at bedtime PRN constipation  nystatin Powder 1 Application(s) Topical two times a day PRN Fungal rash  OLANZapine Injectable 2.5 milliGRAM(s) IntraMuscular once PRN severe agitation  traZODone 25 milliGRAM(s) Oral at bedtime PRN insomnia

## 2021-11-08 NOTE — BH INPATIENT PSYCHIATRY PROGRESS NOTE - NSBHFUPINTERVALHXFT_PSY_A_CORE
Patient seen for follow-up for psychosis. Chart reviewed and case discussed with nursing staff. No overnight events reported, but requires PRN meds for agitation. On evaluation, patient is grossly psychotic, internally preoccupied, and has somatic delusions. She appears to be delirious. Over the weekend, abilify was stopped and Seroquel was started. Will continue to adjust medications. She denies SI/HI. She is medication compliant and denies side effects. Sleep is poor and appetite are fair. No headache, dizziness, or pain.     CBC, CMP, and UA ordered.

## 2021-11-08 NOTE — BH INPATIENT PSYCHIATRY PROGRESS NOTE - NSBHASSESSSUMMFT_PSY_ALL_CORE
Pt is a 71 y/o AAF, domiciled at [facility], long hx of Schizophrenia with multiple past inpatient hospitalization, last hospitalization as per psyckes 5/2018 at [facility], past hx of aggression towards staff and peers, no documented hx of SA, pertinent PMH includes HLD, HTN, Parkinson's dx. Seizure d/o, T2DM, Hypothyroidism, Glaucoma, PVD, Asthma, Bladder Ca, BIB EMS activated by sister for agitation and worsening hallucination. Pt was medically seen found to have a UTI  exam, she was medically cleared and psych was consulted for psychosis.    On exam today, patient is less disorganized, still internally preoccupied but able to relate that her feet itch and she has had a fungal rash in the past. On exam, feet flaky with minimal red areas. Per staff, patient has been eating well. Taking PO meds.   Plan: Will continue CO. Will add nystatin powder, clotrimazole/betamethasone cream already added by SAUL. Will continue rest of the current medication management. Primary team to consider discontinuing Cogentin as patient is no longer on Prolixin.

## 2021-11-08 NOTE — BH INPATIENT PSYCHIATRY PROGRESS NOTE - NSBHCHARTREVIEWVS_PSY_A_CORE FT
Vital Signs Last 24 Hrs  T(C): 36.3 (11-08-21 @ 06:04), Max: 36.3 (11-08-21 @ 06:04)  T(F): 97.3 (11-08-21 @ 06:04), Max: 97.3 (11-08-21 @ 06:04)  HR: --  BP: --  BP(mean): --  RR: 17 (11-08-21 @ 06:04) (17 - 17)  SpO2: --    Orthostatic VS  11-08-21 @ 06:04  Lying BP: --/-- HR: --  Sitting BP: 140/84 HR: 77  Standing BP: 148/73 HR: 80  Site: --  Mode: --  Orthostatic VS  11-07-21 @ 20:19  Lying BP: --/-- HR: --  Sitting BP: 124/81 HR: 91  Standing BP: --/-- HR: --  Site: --  Mode: --  Orthostatic VS  11-07-21 @ 08:03  Lying BP: --/-- HR: --  Sitting BP: 143/83 HR: 92  Standing BP: 134/80 HR: 103  Site: --  Mode: --

## 2021-11-09 PROCEDURE — 99232 SBSQ HOSP IP/OBS MODERATE 35: CPT

## 2021-11-09 RX ADMIN — CARBIDOPA AND LEVODOPA 1 TABLET(S): 25; 100 TABLET ORAL at 20:43

## 2021-11-09 RX ADMIN — Medication 75 MICROGRAM(S): at 06:36

## 2021-11-09 RX ADMIN — Medication 1 TABLET(S): at 09:12

## 2021-11-09 RX ADMIN — Medication 1 MILLIGRAM(S): at 20:42

## 2021-11-09 RX ADMIN — CLOTRIMAZOLE AND BETAMETHASONE DIPROPIONATE 1 APPLICATION(S): 10; .5 CREAM TOPICAL at 20:44

## 2021-11-09 RX ADMIN — SENNA PLUS 2 TABLET(S): 8.6 TABLET ORAL at 20:43

## 2021-11-09 RX ADMIN — METFORMIN HYDROCHLORIDE 500 MILLIGRAM(S): 850 TABLET ORAL at 09:13

## 2021-11-09 RX ADMIN — Medication 25 MILLIGRAM(S): at 09:12

## 2021-11-09 RX ADMIN — Medication 1 MILLIGRAM(S): at 12:11

## 2021-11-09 RX ADMIN — Medication 50 MILLIGRAM(S): at 20:42

## 2021-11-09 RX ADMIN — Medication 25 MILLIGRAM(S): at 20:42

## 2021-11-09 RX ADMIN — CLOPIDOGREL BISULFATE 75 MILLIGRAM(S): 75 TABLET, FILM COATED ORAL at 09:12

## 2021-11-09 RX ADMIN — QUETIAPINE FUMARATE 100 MILLIGRAM(S): 200 TABLET, FILM COATED ORAL at 20:43

## 2021-11-09 RX ADMIN — CARBIDOPA AND LEVODOPA 1 TABLET(S): 25; 100 TABLET ORAL at 12:11

## 2021-11-09 RX ADMIN — CARBIDOPA AND LEVODOPA 1 TABLET(S): 25; 100 TABLET ORAL at 09:12

## 2021-11-09 RX ADMIN — Medication 1 MILLIGRAM(S): at 09:12

## 2021-11-09 RX ADMIN — GABAPENTIN 100 MILLIGRAM(S): 400 CAPSULE ORAL at 09:12

## 2021-11-09 RX ADMIN — QUETIAPINE FUMARATE 100 MILLIGRAM(S): 200 TABLET, FILM COATED ORAL at 09:13

## 2021-11-09 RX ADMIN — LISINOPRIL 10 MILLIGRAM(S): 2.5 TABLET ORAL at 09:13

## 2021-11-09 RX ADMIN — GABAPENTIN 100 MILLIGRAM(S): 400 CAPSULE ORAL at 20:43

## 2021-11-09 RX ADMIN — LATANOPROST 1 DROP(S): 0.05 SOLUTION/ DROPS OPHTHALMIC; TOPICAL at 20:43

## 2021-11-09 RX ADMIN — GABAPENTIN 100 MILLIGRAM(S): 400 CAPSULE ORAL at 12:12

## 2021-11-09 RX ADMIN — CLOTRIMAZOLE AND BETAMETHASONE DIPROPIONATE 1 APPLICATION(S): 10; .5 CREAM TOPICAL at 09:41

## 2021-11-09 RX ADMIN — Medication 1 MILLIGRAM(S): at 09:13

## 2021-11-09 RX ADMIN — DIVALPROEX SODIUM 750 MILLIGRAM(S): 500 TABLET, DELAYED RELEASE ORAL at 20:42

## 2021-11-09 NOTE — UM REPORT PROGRESS NOTE - NSUMSWNOTE_GEN_A_CORE FT
Pt is a 70 yr old female who was transferred from Health system due to agitation, disorganization, and aggressive behavior.  SNF will accept pt back after she has been stabilized.   Pt has resided there for several yrs and has a long history of Schizophrenia.  There was no report of non-compliance with medication, pt was found to have UTI at admission.  Pt remains compliant with medication and is demonstrating slow improvement.  She remains paranoid and verbally aggressive at times. Pt is a 70 yr old female who was transferred from [facility] due to agitation, disorganization, and aggressive behavior.  SNF will accept pt back after she has been stabilized.   Pt has resided there for several yrs and has a long history of Schizophrenia.  There was no report of non-compliance with medication, pt was found to have UTI at admission.  Pt remains compliant with medication but is demonstrating poor response.  She remains paranoid and verbally aggressive at times. She is intrusive and not responsive to redirection or limits. Medication was changed and is being titrated.

## 2021-11-09 NOTE — BH INPATIENT PSYCHIATRY PROGRESS NOTE - NSBHASSESSSUMMFT_PSY_ALL_CORE
Pt is a 69 y/o AAF, domiciled at [facility], long hx of Schizophrenia with multiple past inpatient hospitalization, last hospitalization as per psyckes 5/2018 at [facility], past hx of aggression towards staff and peers, no documented hx of SA, pertinent PMH includes HLD, HTN, Parkinson's dx. Seizure d/o, T2DM, Hypothyroidism, Glaucoma, PVD, Asthma, Bladder Ca, BIB EMS activated by sister for agitation and worsening hallucination. Pt was medically seen found to have a UTI  exam, she was medically cleared and psych was consulted for psychosis.    On exam today, patient is less disorganized, still internally preoccupied but able to relate that her feet itch and she has had a fungal rash in the past. On exam, feet flaky with minimal red areas. Per staff, patient has been eating well. Taking PO meds.   Plan: Will continue CO. Will add nystatin powder, clotrimazole/betamethasone cream already added by SAUL. Will continue rest of the current medication management. Primary team to consider discontinuing Cogentin as patient is no longer on Prolixin.

## 2021-11-09 NOTE — BH INPATIENT PSYCHIATRY PROGRESS NOTE - NSBHMETABOLIC_PSY_ALL_CORE_FT
BMI: BMI (kg/m2): 25.2 (11-06-21 @ 15:58)  HbA1c: A1C with Estimated Average Glucose Result: 5.6 % (10-15-21 @ 11:00)    Glucose: POCT Blood Glucose.: 61 mg/dL (10-19-21 @ 11:59)    BP: --  Lipid Panel: Date/Time: 10-14-21 @ 09:38  Cholesterol, Serum: 159  Direct LDL: --  HDL Cholesterol, Serum: 66  Total Cholesterol/HDL Ration Measurement: --  Triglycerides, Serum: 100

## 2021-11-09 NOTE — UM REPORT PROGRESS NOTE - NSUMSWACTION_GEN_A_CORE FT
SW spoke with pt's sister who reported that pt will act out if she is unable to get what she wants.  Pt's speech is more clear and she is now willing to speak with .  She is unable to participate in discharge planning due to her symptoms, but she is now able to verbalize her needs to staff.    Pt's speech is more clear and she is now willing to speak with .  She is unable to participate in discharge planning due to her symptoms. Pt appears to have increased paranoia and delusions.  SW is unable to initiate Level II due to pt's current symptoms and need for PRN's

## 2021-11-09 NOTE — BH INPATIENT PSYCHIATRY PROGRESS NOTE - NSBHFUPINTERVALHXFT_PSY_A_CORE
Patient seen for follow-up for psychosis. Chart reviewed and case discussed with nursing staff. No overnight events reported, but requires PRN meds for agitation. On evaluation, patient is grossly psychotic, internally preoccupied, and has somatic delusions. She appears to be delirious and is fixated on delusions about her son. Patient claimed her son was in [facility] and they put a maikol in his rectum. She then stated that she saw on TV that her son . Blood labs obtained, were wnl. She refused U/A, will attempt to collect urine for U/A today. She denies SI/HI. She is medication compliant and denies side effects. Sleep is poor and appetite are fair. No headache, dizziness, or pain.

## 2021-11-09 NOTE — BH INPATIENT PSYCHIATRY PROGRESS NOTE - NSBHCHARTREVIEWVS_PSY_A_CORE FT
Vital Signs Last 24 Hrs  T(C): 36.7 (11-09-21 @ 08:05), Max: 37.2 (11-08-21 @ 15:29)  T(F): 98.1 (11-09-21 @ 08:05), Max: 99 (11-08-21 @ 15:29)  HR: --  BP: --  BP(mean): --  RR: --  SpO2: --    Orthostatic VS  11-09-21 @ 08:05  Lying BP: --/-- HR: --  Sitting BP: 154/97 HR: 88  Standing BP: 152/94 HR: 90  Site: --  Mode: --  Orthostatic VS  11-08-21 @ 20:18  Lying BP: --/-- HR: --  Sitting BP: 144/75 HR: 88  Standing BP: --/-- HR: --  Site: --  Mode: --  Orthostatic VS  11-08-21 @ 06:04  Lying BP: --/-- HR: --  Sitting BP: 140/84 HR: 77  Standing BP: 148/73 HR: 80  Site: --  Mode: --  Orthostatic VS  11-07-21 @ 20:19  Lying BP: --/-- HR: --  Sitting BP: 124/81 HR: 91  Standing BP: --/-- HR: --  Site: --  Mode: --

## 2021-11-10 LAB
APPEARANCE UR: CLEAR — SIGNIFICANT CHANGE UP
BILIRUB UR-MCNC: NEGATIVE — SIGNIFICANT CHANGE UP
COLOR SPEC: SIGNIFICANT CHANGE UP
DIFF PNL FLD: NEGATIVE — SIGNIFICANT CHANGE UP
GLUCOSE UR QL: NEGATIVE — SIGNIFICANT CHANGE UP
KETONES UR-MCNC: NEGATIVE — SIGNIFICANT CHANGE UP
LEUKOCYTE ESTERASE UR-ACNC: ABNORMAL
NITRITE UR-MCNC: NEGATIVE — SIGNIFICANT CHANGE UP
PH UR: 7 — SIGNIFICANT CHANGE UP (ref 5–8)
PROT UR-MCNC: NEGATIVE — SIGNIFICANT CHANGE UP
SP GR SPEC: 1.02 — SIGNIFICANT CHANGE UP (ref 1–1.05)
UROBILINOGEN FLD QL: SIGNIFICANT CHANGE UP

## 2021-11-10 PROCEDURE — 99232 SBSQ HOSP IP/OBS MODERATE 35: CPT

## 2021-11-10 RX ADMIN — Medication 25 MILLIGRAM(S): at 21:43

## 2021-11-10 RX ADMIN — METFORMIN HYDROCHLORIDE 500 MILLIGRAM(S): 850 TABLET ORAL at 10:40

## 2021-11-10 RX ADMIN — GABAPENTIN 100 MILLIGRAM(S): 400 CAPSULE ORAL at 10:39

## 2021-11-10 RX ADMIN — DIVALPROEX SODIUM 750 MILLIGRAM(S): 500 TABLET, DELAYED RELEASE ORAL at 21:43

## 2021-11-10 RX ADMIN — QUETIAPINE FUMARATE 100 MILLIGRAM(S): 200 TABLET, FILM COATED ORAL at 10:41

## 2021-11-10 RX ADMIN — Medication 75 MICROGRAM(S): at 05:56

## 2021-11-10 RX ADMIN — Medication 50 MILLIGRAM(S): at 21:43

## 2021-11-10 RX ADMIN — LATANOPROST 1 DROP(S): 0.05 SOLUTION/ DROPS OPHTHALMIC; TOPICAL at 21:43

## 2021-11-10 RX ADMIN — CLOPIDOGREL BISULFATE 75 MILLIGRAM(S): 75 TABLET, FILM COATED ORAL at 10:40

## 2021-11-10 RX ADMIN — CARBIDOPA AND LEVODOPA 1 TABLET(S): 25; 100 TABLET ORAL at 13:04

## 2021-11-10 RX ADMIN — CARBIDOPA AND LEVODOPA 1 TABLET(S): 25; 100 TABLET ORAL at 21:42

## 2021-11-10 RX ADMIN — CLOTRIMAZOLE AND BETAMETHASONE DIPROPIONATE 1 APPLICATION(S): 10; .5 CREAM TOPICAL at 21:43

## 2021-11-10 RX ADMIN — Medication 1 MILLIGRAM(S): at 21:43

## 2021-11-10 RX ADMIN — LISINOPRIL 10 MILLIGRAM(S): 2.5 TABLET ORAL at 10:39

## 2021-11-10 RX ADMIN — Medication 1 MILLIGRAM(S): at 21:42

## 2021-11-10 RX ADMIN — GABAPENTIN 100 MILLIGRAM(S): 400 CAPSULE ORAL at 21:42

## 2021-11-10 RX ADMIN — QUETIAPINE FUMARATE 100 MILLIGRAM(S): 200 TABLET, FILM COATED ORAL at 21:42

## 2021-11-10 RX ADMIN — Medication 1 TABLET(S): at 10:40

## 2021-11-10 RX ADMIN — CLOTRIMAZOLE AND BETAMETHASONE DIPROPIONATE 1 APPLICATION(S): 10; .5 CREAM TOPICAL at 10:39

## 2021-11-10 RX ADMIN — Medication 1 MILLIGRAM(S): at 10:40

## 2021-11-10 RX ADMIN — Medication 25 MILLIGRAM(S): at 10:41

## 2021-11-10 RX ADMIN — Medication 1 MILLIGRAM(S): at 10:39

## 2021-11-10 RX ADMIN — Medication 1 MILLIGRAM(S): at 13:04

## 2021-11-10 RX ADMIN — SENNA PLUS 2 TABLET(S): 8.6 TABLET ORAL at 21:42

## 2021-11-10 RX ADMIN — GABAPENTIN 100 MILLIGRAM(S): 400 CAPSULE ORAL at 13:04

## 2021-11-10 RX ADMIN — CARBIDOPA AND LEVODOPA 1 TABLET(S): 25; 100 TABLET ORAL at 10:39

## 2021-11-10 NOTE — BH INPATIENT PSYCHIATRY PROGRESS NOTE - NSBHMETABOLIC_PSY_ALL_CORE_FT
BMI: BMI (kg/m2): 25.2 (11-06-21 @ 15:58)  HbA1c: A1C with Estimated Average Glucose Result: 5.6 % (10-15-21 @ 11:00)    Glucose: POCT Blood Glucose.: 61 mg/dL (10-19-21 @ 11:59)    BP: 133/74 (11-10-21 @ 07:49) (133/74 - 133/74)  Lipid Panel: Date/Time: 10-14-21 @ 09:38  Cholesterol, Serum: 159  Direct LDL: --  HDL Cholesterol, Serum: 66  Total Cholesterol/HDL Ration Measurement: --  Triglycerides, Serum: 100

## 2021-11-10 NOTE — BH INPATIENT PSYCHIATRY PROGRESS NOTE - NSBHFUPINTERVALHXFT_PSY_A_CORE
Patient seen for follow-up for psychosis. Chart reviewed and case discussed with nursing staff. No overnight events reported, but requires PRN meds for agitation. On evaluation, patient is grossly psychotic, internally preoccupied, and has somatic delusions. She appears to be delirious and is fixated on delusions about her son. Peers on the unit are stating that patient is taking their belongings and is very intrusive. U/A results show negative nitrate and moderate leukocyte concentration, awaiting C&S results. She denies SI/HI. She is medication compliant and denies side effects. Sleep is poor and appetite are fair. No headache, dizziness, or pain.

## 2021-11-10 NOTE — BH INPATIENT PSYCHIATRY PROGRESS NOTE - NSBHASSESSSUMMFT_PSY_ALL_CORE
Pt is a 69 y/o AAF, domiciled at [facility], long hx of Schizophrenia with multiple past inpatient hospitalization, last hospitalization as per psyckes 5/2018 at [facility], past hx of aggression towards staff and peers, no documented hx of SA, pertinent PMH includes HLD, HTN, Parkinson's dx. Seizure d/o, T2DM, Hypothyroidism, Glaucoma, PVD, Asthma, Bladder Ca, BIB EMS activated by sister for agitation and worsening hallucination. Pt was medically seen found to have a UTI  exam, she was medically cleared and psych was consulted for psychosis.    On exam today, patient is less disorganized, still internally preoccupied but able to relate that her feet itch and she has had a fungal rash in the past. On exam, feet flaky with minimal red areas. Per staff, patient has been eating well. Taking PO meds.   Plan: Will continue CO. Will add nystatin powder, clotrimazole/betamethasone cream already added by SAUL. Will continue rest of the current medication management. Primary team to consider discontinuing Cogentin as patient is no longer on Prolixin.

## 2021-11-10 NOTE — BH INPATIENT PSYCHIATRY PROGRESS NOTE - NSBHCHARTREVIEWVS_PSY_A_CORE FT
Vital Signs Last 24 Hrs  T(C): 36.8 (11-10-21 @ 07:49), Max: 36.9 (11-09-21 @ 15:42)  T(F): 98.2 (11-10-21 @ 07:49), Max: 98.5 (11-09-21 @ 15:42)  HR: 79 (11-10-21 @ 07:49) (79 - 79)  BP: 133/74 (11-10-21 @ 07:49) (133/74 - 133/74)  BP(mean): --  RR: 17 (11-10-21 @ 07:49) (17 - 17)  SpO2: --    Orthostatic VS  11-09-21 @ 20:07  Lying BP: --/-- HR: --  Sitting BP: 133/77 HR: 88  Standing BP: --/-- HR: --  Site: --  Mode: --  Orthostatic VS  11-09-21 @ 08:05  Lying BP: --/-- HR: --  Sitting BP: 154/97 HR: 88  Standing BP: 152/94 HR: 90  Site: --  Mode: --  Orthostatic VS  11-08-21 @ 20:18  Lying BP: --/-- HR: --  Sitting BP: 144/75 HR: 88  Standing BP: --/-- HR: --  Site: --  Mode: --

## 2021-11-11 RX ORDER — QUETIAPINE FUMARATE 200 MG/1
150 TABLET, FILM COATED ORAL
Refills: 0 | Status: DISCONTINUED | OUTPATIENT
Start: 2021-11-11 | End: 2021-11-15

## 2021-11-11 RX ORDER — BACITRACIN ZINC 500 UNIT/G
1 OINTMENT IN PACKET (EA) TOPICAL ONCE
Refills: 0 | Status: COMPLETED | OUTPATIENT
Start: 2021-11-11 | End: 2021-11-11

## 2021-11-11 RX ADMIN — Medication 25 MILLIGRAM(S): at 20:59

## 2021-11-11 RX ADMIN — CLOTRIMAZOLE AND BETAMETHASONE DIPROPIONATE 1 APPLICATION(S): 10; .5 CREAM TOPICAL at 08:46

## 2021-11-11 RX ADMIN — GABAPENTIN 100 MILLIGRAM(S): 400 CAPSULE ORAL at 12:19

## 2021-11-11 RX ADMIN — GABAPENTIN 100 MILLIGRAM(S): 400 CAPSULE ORAL at 08:45

## 2021-11-11 RX ADMIN — LISINOPRIL 10 MILLIGRAM(S): 2.5 TABLET ORAL at 08:44

## 2021-11-11 RX ADMIN — GABAPENTIN 100 MILLIGRAM(S): 400 CAPSULE ORAL at 21:00

## 2021-11-11 RX ADMIN — QUETIAPINE FUMARATE 150 MILLIGRAM(S): 200 TABLET, FILM COATED ORAL at 21:00

## 2021-11-11 RX ADMIN — LATANOPROST 1 DROP(S): 0.05 SOLUTION/ DROPS OPHTHALMIC; TOPICAL at 21:01

## 2021-11-11 RX ADMIN — Medication 1 MILLIGRAM(S): at 08:45

## 2021-11-11 RX ADMIN — CARBIDOPA AND LEVODOPA 1 TABLET(S): 25; 100 TABLET ORAL at 21:00

## 2021-11-11 RX ADMIN — Medication 25 MILLIGRAM(S): at 08:45

## 2021-11-11 RX ADMIN — Medication 1 MILLIGRAM(S): at 21:01

## 2021-11-11 RX ADMIN — Medication 1 APPLICATION(S): at 21:10

## 2021-11-11 RX ADMIN — CLOTRIMAZOLE AND BETAMETHASONE DIPROPIONATE 1 APPLICATION(S): 10; .5 CREAM TOPICAL at 21:10

## 2021-11-11 RX ADMIN — POLYETHYLENE GLYCOL 3350 8.5 GRAM(S): 17 POWDER, FOR SOLUTION ORAL at 08:46

## 2021-11-11 RX ADMIN — CARBIDOPA AND LEVODOPA 1 TABLET(S): 25; 100 TABLET ORAL at 12:19

## 2021-11-11 RX ADMIN — Medication 50 MILLIGRAM(S): at 21:01

## 2021-11-11 RX ADMIN — Medication 1 MILLIGRAM(S): at 21:00

## 2021-11-11 RX ADMIN — DIVALPROEX SODIUM 750 MILLIGRAM(S): 500 TABLET, DELAYED RELEASE ORAL at 21:00

## 2021-11-11 RX ADMIN — Medication 75 MICROGRAM(S): at 06:43

## 2021-11-11 RX ADMIN — CLOPIDOGREL BISULFATE 75 MILLIGRAM(S): 75 TABLET, FILM COATED ORAL at 08:44

## 2021-11-11 RX ADMIN — METFORMIN HYDROCHLORIDE 500 MILLIGRAM(S): 850 TABLET ORAL at 08:45

## 2021-11-11 RX ADMIN — Medication 1 MILLIGRAM(S): at 12:19

## 2021-11-11 RX ADMIN — CARBIDOPA AND LEVODOPA 1 TABLET(S): 25; 100 TABLET ORAL at 08:45

## 2021-11-11 RX ADMIN — Medication 1 TABLET(S): at 08:44

## 2021-11-11 RX ADMIN — SENNA PLUS 2 TABLET(S): 8.6 TABLET ORAL at 20:59

## 2021-11-11 RX ADMIN — QUETIAPINE FUMARATE 150 MILLIGRAM(S): 200 TABLET, FILM COATED ORAL at 08:45

## 2021-11-11 NOTE — BH INPATIENT PSYCHIATRY PROGRESS NOTE - CURRENT MEDICATION
MEDICATIONS  (STANDING):  benztropine 1 milliGRAM(s) Oral two times a day  carbidopa/levodopa  25/100 1 Tablet(s) Oral three times a day  clopidogrel Tablet 75 milliGRAM(s) Oral daily  clotrimazole/betamethasone Cream 1 Application(s) Topical two times a day  diVALproex Sprinkle 750 milliGRAM(s) Oral at bedtime  gabapentin 100 milliGRAM(s) Oral three times a day  latanoprost 0.005% Ophthalmic Solution 1 Drop(s) Both EYES at bedtime  levothyroxine 75 MICROGram(s) Oral <User Schedule>  lisinopril 10 milliGRAM(s) Oral daily  LORazepam     Tablet 1 milliGRAM(s) Oral three times a day  metFORMIN 500 milliGRAM(s) Oral daily  metoprolol tartrate 25 milliGRAM(s) Oral two times a day  multivitamin/minerals 1 Tablet(s) Oral daily  polyethylene glycol 3350 8.5 Gram(s) Oral daily  QUEtiapine 150 milliGRAM(s) Oral two times a day  senna 2 Tablet(s) Oral at bedtime  traZODone 50 milliGRAM(s) Oral at bedtime    MEDICATIONS  (PRN):  ammonium lactate 12% Lotion 1 Application(s) Topical two times a day PRN dry skin  hemorrhoidal Ointment 1 Application(s) Rectal every 6 hours PRN hemorrhoid discomfort  lactulose Syrup 10 Gram(s) Oral at bedtime PRN constipation  nystatin Powder 1 Application(s) Topical two times a day PRN Fungal rash  OLANZapine Injectable 2.5 milliGRAM(s) IntraMuscular once PRN severe agitation  traZODone 25 milliGRAM(s) Oral at bedtime PRN insomnia

## 2021-11-11 NOTE — BH INPATIENT PSYCHIATRY PROGRESS NOTE - NSBHFUPINTERVALCCFT_PSY_A_CORE
Patient seen for schizophrenia. "The food here, they make it, they don't wear caps or gloves. They fired me, I was working here."

## 2021-11-11 NOTE — BH INPATIENT PSYCHIATRY PROGRESS NOTE - NSBHCHARTREVIEWVS_PSY_A_CORE FT
Vital Signs Last 24 Hrs  T(C): 36.8 (11-10-21 @ 15:28), Max: 36.8 (11-10-21 @ 15:28)  T(F): 98.2 (11-10-21 @ 15:28), Max: 98.2 (11-10-21 @ 15:28)  HR: 100 (11-10-21 @ 20:29) (100 - 100)  BP: 149/56 (11-10-21 @ 20:29) (149/56 - 149/56)  BP(mean): --  RR: --  SpO2: --    Orthostatic VS  11-09-21 @ 20:07  Lying BP: --/-- HR: --  Sitting BP: 133/77 HR: 88  Standing BP: --/-- HR: --  Site: --  Mode: --

## 2021-11-11 NOTE — BH INPATIENT PSYCHIATRY PROGRESS NOTE - NSBHMETABOLIC_PSY_ALL_CORE_FT
BMI: BMI (kg/m2): 25.2 (11-06-21 @ 15:58)  HbA1c: A1C with Estimated Average Glucose Result: 5.6 % (10-15-21 @ 11:00)    Glucose: POCT Blood Glucose.: 61 mg/dL (10-19-21 @ 11:59)    BP: 149/56 (11-10-21 @ 20:29) (133/74 - 149/56)  Lipid Panel: Date/Time: 10-14-21 @ 09:38  Cholesterol, Serum: 159  Direct LDL: --  HDL Cholesterol, Serum: 66  Total Cholesterol/HDL Ration Measurement: --  Triglycerides, Serum: 100

## 2021-11-11 NOTE — BH INPATIENT PSYCHIATRY PROGRESS NOTE - NSBHASSESSSUMMFT_PSY_ALL_CORE
Pt is a 69 y/o AAF, domiciled at [facility], long hx of Schizophrenia with multiple past inpatient hospitalization, last hospitalization as per psyckes 5/2018 at [facility], past hx of aggression towards staff and peers, no documented hx of SA, pertinent PMH includes HLD, HTN, Parkinson's dx. Seizure d/o, T2DM, Hypothyroidism, Glaucoma, PVD, Asthma, Bladder Ca, BIB EMS activated by sister for agitation and worsening hallucination. Pt was medically seen found to have a UTI  exam, she was medically cleared and psych was consulted for psychosis.    On exam today, patient is less disorganized, still internally preoccupied but able to relate that her feet itch and she has had a fungal rash in the past. On exam, feet flaky with minimal red areas. Per staff, patient has been eating well. Taking PO meds.   Plan: Will continue CO. Will add nystatin powder, clotrimazole/betamethasone cream already added by SAUL. Will continue rest of the current medication management. Primary team to consider discontinuing Cogentin as patient is no longer on Prolixin.

## 2021-11-11 NOTE — BH INPATIENT PSYCHIATRY PROGRESS NOTE - NSBHFUPINTERVALHXFT_PSY_A_CORE
Patient seen for follow-up for psychosis. Chart reviewed and case discussed with nursing staff. No overnight events reported, but requires PRN meds for agitation. On evaluation, patient is grossly psychotic, internally preoccupied, and has somatic delusions. She appears to be delirious and is fixated on delusions about her son. 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 She treats a peer on the unit as her daughter. Peers on unit are complaining about the patient being intrusive. She denies SHIIP. She is medication compliant and denies side effects. Sleep is poor and appetite are fair. No headache, dizziness, pain, and constipation.     Seroquel increased to 150 mg po tid for psychosis. Patient seen for follow-up for psychosis. Chart reviewed and case discussed with nursing staff. No overnight events reported, but requires PRN meds for agitation. On evaluation, patient is grossly psychotic, internally preoccupied, and has somatic delusions. She appears to be delirious and is fixated on delusions about her son. 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 She treats a peer on the unit as her daughter. Peers on unit are complaining about the patient being intrusive. She denies SHIIP. She is medication compliant and denies side effects. Sleep is poor and appetite are fair. No headache, dizziness, pain, and constipation.     Seroquel increased to 150 mg po bid for psychosis.

## 2021-11-12 LAB
CULTURE RESULTS: SIGNIFICANT CHANGE UP
SPECIMEN SOURCE: SIGNIFICANT CHANGE UP

## 2021-11-12 RX ADMIN — QUETIAPINE FUMARATE 150 MILLIGRAM(S): 200 TABLET, FILM COATED ORAL at 09:41

## 2021-11-12 RX ADMIN — Medication 75 MICROGRAM(S): at 06:18

## 2021-11-12 RX ADMIN — Medication 1 MILLIGRAM(S): at 13:06

## 2021-11-12 RX ADMIN — Medication 1 MILLIGRAM(S): at 21:52

## 2021-11-12 RX ADMIN — GABAPENTIN 100 MILLIGRAM(S): 400 CAPSULE ORAL at 21:52

## 2021-11-12 RX ADMIN — Medication 25 MILLIGRAM(S): at 09:41

## 2021-11-12 RX ADMIN — Medication 1 APPLICATION(S): at 21:56

## 2021-11-12 RX ADMIN — CLOTRIMAZOLE AND BETAMETHASONE DIPROPIONATE 1 APPLICATION(S): 10; .5 CREAM TOPICAL at 21:56

## 2021-11-12 RX ADMIN — CLOPIDOGREL BISULFATE 75 MILLIGRAM(S): 75 TABLET, FILM COATED ORAL at 09:40

## 2021-11-12 RX ADMIN — DIVALPROEX SODIUM 750 MILLIGRAM(S): 500 TABLET, DELAYED RELEASE ORAL at 21:50

## 2021-11-12 RX ADMIN — LATANOPROST 1 DROP(S): 0.05 SOLUTION/ DROPS OPHTHALMIC; TOPICAL at 21:57

## 2021-11-12 RX ADMIN — GABAPENTIN 100 MILLIGRAM(S): 400 CAPSULE ORAL at 09:41

## 2021-11-12 RX ADMIN — METFORMIN HYDROCHLORIDE 500 MILLIGRAM(S): 850 TABLET ORAL at 09:40

## 2021-11-12 RX ADMIN — Medication 25 MILLIGRAM(S): at 21:53

## 2021-11-12 RX ADMIN — CLOTRIMAZOLE AND BETAMETHASONE DIPROPIONATE 1 APPLICATION(S): 10; .5 CREAM TOPICAL at 09:51

## 2021-11-12 RX ADMIN — CARBIDOPA AND LEVODOPA 1 TABLET(S): 25; 100 TABLET ORAL at 13:07

## 2021-11-12 RX ADMIN — Medication 50 MILLIGRAM(S): at 21:53

## 2021-11-12 RX ADMIN — Medication 1 MILLIGRAM(S): at 09:41

## 2021-11-12 RX ADMIN — CARBIDOPA AND LEVODOPA 1 TABLET(S): 25; 100 TABLET ORAL at 09:41

## 2021-11-12 RX ADMIN — QUETIAPINE FUMARATE 150 MILLIGRAM(S): 200 TABLET, FILM COATED ORAL at 21:52

## 2021-11-12 RX ADMIN — GABAPENTIN 100 MILLIGRAM(S): 400 CAPSULE ORAL at 13:07

## 2021-11-12 RX ADMIN — Medication 1 TABLET(S): at 09:41

## 2021-11-12 RX ADMIN — CARBIDOPA AND LEVODOPA 1 TABLET(S): 25; 100 TABLET ORAL at 21:53

## 2021-11-12 RX ADMIN — SENNA PLUS 2 TABLET(S): 8.6 TABLET ORAL at 21:53

## 2021-11-12 RX ADMIN — LISINOPRIL 10 MILLIGRAM(S): 2.5 TABLET ORAL at 09:40

## 2021-11-12 NOTE — BH INPATIENT PSYCHIATRY PROGRESS NOTE - NSBHASSESSSUMMFT_PSY_ALL_CORE
Pt is a 71 y/o AAF, domiciled at [facility], long hx of Schizophrenia with multiple past inpatient hospitalization, last hospitalization as per psyckes 5/2018 at [facility], past hx of aggression towards staff and peers, no documented hx of SA, pertinent PMH includes HLD, HTN, Parkinson's dx. Seizure d/o, T2DM, Hypothyroidism, Glaucoma, PVD, Asthma, Bladder Ca, BIB EMS activated by sister for agitation and worsening hallucination. Pt was medically seen found to have a UTI  exam, she was medically cleared and psych was consulted for psychosis.    On exam today, patient is disorganized, internally preoccupied, and labile. Per staff, patient remains intrusive and makes her peers uncomfortable. Per staff, patient is taking PO meds, eating and sleeping well.     Plan: Continue current medications. Will consider medication adjustment next week.

## 2021-11-12 NOTE — BH INPATIENT PSYCHIATRY PROGRESS NOTE - NSBHMETABOLIC_PSY_ALL_CORE_FT
BMI: BMI (kg/m2): 25.2 (11-06-21 @ 15:58)  HbA1c: A1C with Estimated Average Glucose Result: 5.6 % (10-15-21 @ 11:00)  Glucose: POCT Blood Glucose.: 61 mg/dL (10-19-21 @ 11:59)  BP: 149/56 (11-10-21 @ 20:29) (133/74 - 149/56)  Lipid Panel: Date/Time: 10-14-21 @ 09:38  Cholesterol, Serum: 159  HDL Cholesterol, Serum: 66  Triglycerides, Serum: 100

## 2021-11-12 NOTE — BH INPATIENT PSYCHIATRY PROGRESS NOTE - NSBHFUPINTERVALHXFT_PSY_A_CORE
Patient seen for follow-up for psychosis. Chart reviewed and case discussed with interdisciplinary staff. Per staff, patient continues to be intrusive and labile. On evaluation, patient is pacing the unit, appears internally preoccupied, tells writer that she is sad as her son and grandson are now dead abd begins to get tearful. Patient denies any SI/HI. Denies AVH. Sleep and appetite are fair. Taking meds. No headache, dizziness, and constipation reported. Reports pain in  area, was previously seen by OB-Gyn attending on 10/22/2021 and found to have a grade 2 cystocele without bleeding or discharge.

## 2021-11-12 NOTE — BH INPATIENT PSYCHIATRY PROGRESS NOTE - NSBHCHARTREVIEWVS_PSY_A_CORE FT
Vital Signs Last 24 Hrs  T(C): --  T(F): --  HR: --  BP: --  BP(mean): --  RR: --  SpO2: --    Orthostatic VS  11-11-21 @ 20:20  Lying BP: --/-- HR: --  Sitting BP: 129/74 HR: 90  Standing BP: --/-- HR: --  Site: --  Mode: --

## 2021-11-12 NOTE — BH INPATIENT PSYCHIATRY PROGRESS NOTE - NSBHASSESSSUMMFT_PSY_ALL_CORE
Pt is a 69 y/o AAF, domiciled at [facility], long hx of Schizophrenia with multiple past inpatient hospitalization, last hospitalization as per psyckes 5/2018 at [facility], past hx of aggression towards staff and peers, no documented hx of SA, pertinent PMH includes HLD, HTN, Parkinson's dx. Seizure d/o, T2DM, Hypothyroidism, Glaucoma, PVD, Asthma, Bladder Ca, BIB EMS activated by sister for agitation and worsening hallucination. Pt was medically seen found to have a UTI  exam, once medically cleared sent to [facility] for further management.    On exam today, patient is disorganized and internally preoccupied with poor boundaries. Taking PO meds. Eating and sleeping adequately.     Plan: Will continue current medication management.

## 2021-11-12 NOTE — BH INPATIENT PSYCHIATRY PROGRESS NOTE - NSBHFUPINTERVALHXFT_PSY_A_CORE
Patient seen for follow-up for psychosis. Chart reviewed and case discussed with nursing staff. No overnight events reported, but requires PRN meds for agitation. On evaluation, patient is grossly psychotic, internally preoccupied, and has somatic delusions. She remains fixated on delusions about her son. Remains intrusive , labile and aggressive. Makes peers on unit uncomfortable. She denies SHIIP. She is medication compliant, no adverse effects reported. Sleep and appetite are fair. No headache, dizziness, and constipation reported.    Patient seen for follow-up for psychosis. Chart reviewed and case discussed with nursing staff. No overnight events reported, but requires PRN meds for agitation. On evaluation, patient is grossly psychotic, internally preoccupied, and has somatic delusions. She remains fixated on delusions about her son. Remains intrusive , labile and aggressive. Makes peers on unit uncomfortable. She denies SHIIP. She is medication compliant, no adverse effects reported. Sleep and appetite are fair. No headache, dizziness, and constipation reported. Reports pain in  area, was previously seen by Ob-Gyn attending on ( ) and found to have a grade 2 cystocele without bleeding or discharge.    Patient seen for follow-up for psychosis. Chart reviewed and case discussed with nursing staff. No overnight events reported, but requires PRN meds for agitation. On evaluation, patient is grossly psychotic, internally preoccupied, and has somatic delusions. She remains fixated on delusions about her son. Remains intrusive , labile and aggressive. Makes peers on unit uncomfortable. She denies SHIIP. She is medication compliant, no adverse effects reported. Sleep and appetite are fair. No headache, dizziness, and constipation reported. Reports pain in  area, was previously seen by Ob-Gyn attending on 10/22/2021 and found to have a grade 2 cystocele without bleeding or discharge.

## 2021-11-13 PROCEDURE — 99231 SBSQ HOSP IP/OBS SF/LOW 25: CPT

## 2021-11-13 RX ADMIN — Medication 1 MILLIGRAM(S): at 08:23

## 2021-11-13 RX ADMIN — LISINOPRIL 10 MILLIGRAM(S): 2.5 TABLET ORAL at 08:23

## 2021-11-13 RX ADMIN — Medication 1 MILLIGRAM(S): at 21:47

## 2021-11-13 RX ADMIN — QUETIAPINE FUMARATE 150 MILLIGRAM(S): 200 TABLET, FILM COATED ORAL at 08:24

## 2021-11-13 RX ADMIN — DIVALPROEX SODIUM 750 MILLIGRAM(S): 500 TABLET, DELAYED RELEASE ORAL at 21:46

## 2021-11-13 RX ADMIN — LATANOPROST 1 DROP(S): 0.05 SOLUTION/ DROPS OPHTHALMIC; TOPICAL at 21:48

## 2021-11-13 RX ADMIN — GABAPENTIN 100 MILLIGRAM(S): 400 CAPSULE ORAL at 21:47

## 2021-11-13 RX ADMIN — GABAPENTIN 100 MILLIGRAM(S): 400 CAPSULE ORAL at 16:03

## 2021-11-13 RX ADMIN — Medication 1 MILLIGRAM(S): at 18:44

## 2021-11-13 RX ADMIN — CARBIDOPA AND LEVODOPA 1 TABLET(S): 25; 100 TABLET ORAL at 21:47

## 2021-11-13 RX ADMIN — Medication 25 MILLIGRAM(S): at 21:47

## 2021-11-13 RX ADMIN — METFORMIN HYDROCHLORIDE 500 MILLIGRAM(S): 850 TABLET ORAL at 08:24

## 2021-11-13 RX ADMIN — Medication 1 TABLET(S): at 08:23

## 2021-11-13 RX ADMIN — CARBIDOPA AND LEVODOPA 1 TABLET(S): 25; 100 TABLET ORAL at 08:23

## 2021-11-13 RX ADMIN — SENNA PLUS 2 TABLET(S): 8.6 TABLET ORAL at 21:46

## 2021-11-13 RX ADMIN — Medication 1 MILLIGRAM(S): at 16:03

## 2021-11-13 RX ADMIN — QUETIAPINE FUMARATE 150 MILLIGRAM(S): 200 TABLET, FILM COATED ORAL at 21:47

## 2021-11-13 RX ADMIN — Medication 75 MICROGRAM(S): at 06:47

## 2021-11-13 RX ADMIN — GABAPENTIN 100 MILLIGRAM(S): 400 CAPSULE ORAL at 08:23

## 2021-11-13 RX ADMIN — Medication 25 MILLIGRAM(S): at 08:24

## 2021-11-13 RX ADMIN — Medication 50 MILLIGRAM(S): at 21:46

## 2021-11-13 RX ADMIN — CLOPIDOGREL BISULFATE 75 MILLIGRAM(S): 75 TABLET, FILM COATED ORAL at 08:24

## 2021-11-13 NOTE — BH INPATIENT PSYCHIATRY PROGRESS NOTE - NSBHASSESSSUMMFT_PSY_ALL_CORE
Pt is a 69 y/o AAF, domiciled at [facility], long hx of Schizophrenia with multiple past inpatient hospitalization, last hospitalization as per psyckes 5/2018 at [facility], past hx of aggression towards staff and peers, no documented hx of SA, pertinent PMH includes HLD, HTN, Parkinson's dx. Seizure d/o, T2DM, Hypothyroidism, Glaucoma, PVD, Asthma, Bladder Ca, BIB EMS activated by sister for agitation and worsening hallucination. Pt was medically seen found to have a UTI  exam, once medically cleared sent to [facility] for further management.    11/13: Patient seen for follow-up for psychosis. Chart reviewed and case discussed with interdisciplinary staff. Pt seen in the dining area, under good behavioral control. No PRNs needed/ Reports her mood "fluctuates". Denies difficulty sleeping, anxiety, SIIP. Complains about pureed diet. In the past has reported pain in  area, was previously seen by OB-Gyn attending on 10/22/2021 and found to have a grade 2 cystocele without bleeding or discharge.    Plan: Will continue current medication management.

## 2021-11-13 NOTE — BH INPATIENT PSYCHIATRY PROGRESS NOTE - NSBHFUPINTERVALHXFT_PSY_A_CORE
Patient seen for follow-up for psychosis. Chart reviewed and case discussed with interdisciplinary staff. Pt seen in the dining area, under good behavioral control. No PRNs needed/ Reports her mood "fluctuates". Denies difficulty sleeping, anxiety, SIIP. Complains about pureed diet. In the past has reported pain in  area, was previously seen by OB-Gyn attending on 10/22/2021 and found to have a grade 2 cystocele without bleeding or discharge.

## 2021-11-13 NOTE — BH INPATIENT PSYCHIATRY PROGRESS NOTE - CURRENT MEDICATION
MEDICATIONS  (STANDING):  benztropine 1 milliGRAM(s) Oral two times a day  carbidopa/levodopa  25/100 1 Tablet(s) Oral three times a day  clopidogrel Tablet 75 milliGRAM(s) Oral daily  clotrimazole/betamethasone Cream 1 Application(s) Topical two times a day  diVALproex Sprinkle 750 milliGRAM(s) Oral at bedtime  gabapentin 100 milliGRAM(s) Oral three times a day  latanoprost 0.005% Ophthalmic Solution 1 Drop(s) Both EYES at bedtime  levothyroxine 75 MICROGram(s) Oral <User Schedule>  lisinopril 10 milliGRAM(s) Oral daily  LORazepam     Tablet 1 milliGRAM(s) Oral three times a day  metFORMIN 500 milliGRAM(s) Oral daily  metoprolol tartrate 25 milliGRAM(s) Oral two times a day  multivitamin/minerals 1 Tablet(s) Oral daily  polyethylene glycol 3350 8.5 Gram(s) Oral daily  QUEtiapine 150 milliGRAM(s) Oral two times a day  senna 2 Tablet(s) Oral at bedtime  traZODone 50 milliGRAM(s) Oral at bedtime    MEDICATIONS  (PRN):  ammonium lactate 12% Lotion 1 Application(s) Topical two times a day PRN dry skin  hemorrhoidal Ointment 1 Application(s) Rectal every 6 hours PRN hemorrhoid discomfort  lactulose Syrup 10 Gram(s) Oral at bedtime PRN constipation  LORazepam     Tablet 1 milliGRAM(s) Oral two times a day PRN Agitation  nystatin Powder 1 Application(s) Topical two times a day PRN Fungal rash  OLANZapine Injectable 2.5 milliGRAM(s) IntraMuscular once PRN severe agitation  traZODone 25 milliGRAM(s) Oral at bedtime PRN insomnia

## 2021-11-13 NOTE — BH INPATIENT PSYCHIATRY PROGRESS NOTE - NSBHCHARTREVIEWVS_PSY_A_CORE FT
Vital Signs Last 24 Hrs  T(C): 36.1 (11-12-21 @ 20:38), Max: 36.7 (11-12-21 @ 15:41)  T(F): 97 (11-12-21 @ 20:38), Max: 98.1 (11-12-21 @ 15:41)  HR: --  BP: --  BP(mean): --  RR: --  SpO2: --    Orthostatic VS  11-12-21 @ 20:38  Lying BP: --/-- HR: --  Sitting BP: 138/86 HR: 99  Standing BP: --/-- HR: --  Site: --  Mode: --  Orthostatic VS  11-11-21 @ 20:20  Lying BP: --/-- HR: --  Sitting BP: 129/74 HR: 90  Standing BP: --/-- HR: --  Site: --  Mode: --

## 2021-11-13 NOTE — BH INPATIENT PSYCHIATRY PROGRESS NOTE - NSBHMETABOLIC_PSY_ALL_CORE_FT
BMI: BMI (kg/m2): 25.2 (11-06-21 @ 15:58)  HbA1c: A1C with Estimated Average Glucose Result: 5.6 % (10-15-21 @ 11:00)    Glucose: POCT Blood Glucose.: 61 mg/dL (10-19-21 @ 11:59)    BP: 149/56 (11-10-21 @ 20:29) (149/56 - 149/56)  Lipid Panel: Date/Time: 10-14-21 @ 09:38  Cholesterol, Serum: 159  Direct LDL: --  HDL Cholesterol, Serum: 66  Total Cholesterol/HDL Ration Measurement: --  Triglycerides, Serum: 100

## 2021-11-14 PROCEDURE — 99231 SBSQ HOSP IP/OBS SF/LOW 25: CPT

## 2021-11-14 RX ADMIN — QUETIAPINE FUMARATE 150 MILLIGRAM(S): 200 TABLET, FILM COATED ORAL at 22:43

## 2021-11-14 RX ADMIN — Medication 1 MILLIGRAM(S): at 08:35

## 2021-11-14 RX ADMIN — LATANOPROST 1 DROP(S): 0.05 SOLUTION/ DROPS OPHTHALMIC; TOPICAL at 22:43

## 2021-11-14 RX ADMIN — SENNA PLUS 2 TABLET(S): 8.6 TABLET ORAL at 20:12

## 2021-11-14 RX ADMIN — QUETIAPINE FUMARATE 150 MILLIGRAM(S): 200 TABLET, FILM COATED ORAL at 08:34

## 2021-11-14 RX ADMIN — CLOTRIMAZOLE AND BETAMETHASONE DIPROPIONATE 1 APPLICATION(S): 10; .5 CREAM TOPICAL at 22:42

## 2021-11-14 RX ADMIN — Medication 50 MILLIGRAM(S): at 20:11

## 2021-11-14 RX ADMIN — GABAPENTIN 100 MILLIGRAM(S): 400 CAPSULE ORAL at 20:10

## 2021-11-14 RX ADMIN — Medication 1 MILLIGRAM(S): at 20:11

## 2021-11-14 RX ADMIN — DIVALPROEX SODIUM 750 MILLIGRAM(S): 500 TABLET, DELAYED RELEASE ORAL at 20:12

## 2021-11-14 RX ADMIN — GABAPENTIN 100 MILLIGRAM(S): 400 CAPSULE ORAL at 08:35

## 2021-11-14 RX ADMIN — CARBIDOPA AND LEVODOPA 1 TABLET(S): 25; 100 TABLET ORAL at 20:11

## 2021-11-14 RX ADMIN — Medication 1 MILLIGRAM(S): at 09:55

## 2021-11-14 RX ADMIN — Medication 25 MILLIGRAM(S): at 20:11

## 2021-11-14 NOTE — BH INPATIENT PSYCHIATRY PROGRESS NOTE - NSBHFUPINTERVALHXFT_PSY_A_CORE
Patient seen for follow-up for psychosis. Chart reviewed and case discussed with nursing staff. Per staff, Pt remains intrusive, irritable, easily agitated, received PRN ativan yesterday and today. She is partially compliant with meds, refused most meds today. On exam, pt reports feeling tired, speaking disorganized, dysarthric due to denture.  Denies any SI, HI.  In the past has reported pain in  area, was previously seen by OB-Gyn attending on 10/22/2021 and found to have a grade 2 cystocele without bleeding or discharge.

## 2021-11-14 NOTE — BH INPATIENT PSYCHIATRY PROGRESS NOTE - NSBHCHARTREVIEWVS_PSY_A_CORE FT
Vital Signs Last 24 Hrs  T(C): 37 (11-13-21 @ 16:09), Max: 37 (11-13-21 @ 16:09)  T(F): 98.6 (11-13-21 @ 16:09), Max: 98.6 (11-13-21 @ 16:09)  HR: --  BP: --  BP(mean): --  RR: --  SpO2: --    Orthostatic VS  11-13-21 @ 20:25  Lying BP: --/-- HR: --  Sitting BP: 143/90 HR: 98  Standing BP: --/-- HR: --  Site: --  Mode: --  Orthostatic VS  11-12-21 @ 20:38  Lying BP: --/-- HR: --  Sitting BP: 138/86 HR: 99  Standing BP: --/-- HR: --  Site: --  Mode: --

## 2021-11-14 NOTE — BH INPATIENT PSYCHIATRY PROGRESS NOTE - NSBHASSESSSUMMFT_PSY_ALL_CORE
Pt is a 71 y/o AAF, domiciled at [facility], long hx of Schizophrenia with multiple past inpatient hospitalization, last hospitalization as per psyckes 5/2018 at [facility], past hx of aggression towards staff and peers, no documented hx of SA, pertinent PMH includes HLD, HTN, Parkinson's dx. Seizure d/o, T2DM, Hypothyroidism, Glaucoma, PVD, Asthma, Bladder Ca, BIB EMS activated by sister for agitation and worsening hallucination. Pt was medically seen found to have a UTI  exam, once medically cleared sent to [facility] for further management.    11/13: Patient seen for follow-up for psychosis. Chart reviewed and case discussed with interdisciplinary staff. Pt seen in the dining area, under good behavioral control. No PRNs needed/ Reports her mood "fluctuates". Denies difficulty sleeping, anxiety, SIIP. Complains about pureed diet. In the past has reported pain in  area, was previously seen by OB-Gyn attending on 10/22/2021 and found to have a grade 2 cystocele without bleeding or discharge.    11/14: easily irritable, paranoid, illogical, disorganized, received PRN ativan yesterday and today, not compliant with most meds today. No SI/HI.    Plan: Will continue current medication management.

## 2021-11-15 PROCEDURE — 99232 SBSQ HOSP IP/OBS MODERATE 35: CPT

## 2021-11-15 RX ORDER — OLANZAPINE 15 MG/1
2.5 TABLET, FILM COATED ORAL ONCE
Refills: 0 | Status: COMPLETED | OUTPATIENT
Start: 2021-11-15 | End: 2021-11-15

## 2021-11-15 RX ORDER — RISPERIDONE 4 MG/1
0.5 TABLET ORAL
Refills: 0 | Status: DISCONTINUED | OUTPATIENT
Start: 2021-11-15 | End: 2021-11-16

## 2021-11-15 RX ADMIN — CARBIDOPA AND LEVODOPA 1 TABLET(S): 25; 100 TABLET ORAL at 13:47

## 2021-11-15 RX ADMIN — CLOTRIMAZOLE AND BETAMETHASONE DIPROPIONATE 1 APPLICATION(S): 10; .5 CREAM TOPICAL at 21:10

## 2021-11-15 RX ADMIN — Medication 75 MICROGRAM(S): at 06:14

## 2021-11-15 RX ADMIN — CARBIDOPA AND LEVODOPA 1 TABLET(S): 25; 100 TABLET ORAL at 10:07

## 2021-11-15 RX ADMIN — Medication 1 TABLET(S): at 07:43

## 2021-11-15 RX ADMIN — RISPERIDONE 0.5 MILLIGRAM(S): 4 TABLET ORAL at 10:07

## 2021-11-15 RX ADMIN — CLOTRIMAZOLE AND BETAMETHASONE DIPROPIONATE 1 APPLICATION(S): 10; .5 CREAM TOPICAL at 09:53

## 2021-11-15 RX ADMIN — LATANOPROST 1 DROP(S): 0.05 SOLUTION/ DROPS OPHTHALMIC; TOPICAL at 20:57

## 2021-11-15 RX ADMIN — Medication 1 MILLIGRAM(S): at 07:43

## 2021-11-15 RX ADMIN — CARBIDOPA AND LEVODOPA 1 TABLET(S): 25; 100 TABLET ORAL at 20:55

## 2021-11-15 RX ADMIN — SENNA PLUS 2 TABLET(S): 8.6 TABLET ORAL at 20:55

## 2021-11-15 RX ADMIN — Medication 25 MILLIGRAM(S): at 20:56

## 2021-11-15 RX ADMIN — DIVALPROEX SODIUM 750 MILLIGRAM(S): 500 TABLET, DELAYED RELEASE ORAL at 20:57

## 2021-11-15 RX ADMIN — QUETIAPINE FUMARATE 150 MILLIGRAM(S): 200 TABLET, FILM COATED ORAL at 07:43

## 2021-11-15 RX ADMIN — METFORMIN HYDROCHLORIDE 500 MILLIGRAM(S): 850 TABLET ORAL at 07:43

## 2021-11-15 RX ADMIN — Medication 1 MILLIGRAM(S): at 20:56

## 2021-11-15 RX ADMIN — Medication 50 MILLIGRAM(S): at 20:56

## 2021-11-15 RX ADMIN — Medication 1 MILLIGRAM(S): at 13:47

## 2021-11-15 RX ADMIN — GABAPENTIN 100 MILLIGRAM(S): 400 CAPSULE ORAL at 07:44

## 2021-11-15 RX ADMIN — Medication 1 MILLIGRAM(S): at 20:55

## 2021-11-15 RX ADMIN — GABAPENTIN 100 MILLIGRAM(S): 400 CAPSULE ORAL at 20:56

## 2021-11-15 RX ADMIN — Medication 1 MILLIGRAM(S): at 17:25

## 2021-11-15 RX ADMIN — CLOPIDOGREL BISULFATE 75 MILLIGRAM(S): 75 TABLET, FILM COATED ORAL at 07:43

## 2021-11-15 RX ADMIN — GABAPENTIN 100 MILLIGRAM(S): 400 CAPSULE ORAL at 13:47

## 2021-11-15 RX ADMIN — RISPERIDONE 0.5 MILLIGRAM(S): 4 TABLET ORAL at 20:56

## 2021-11-15 RX ADMIN — OLANZAPINE 2.5 MILLIGRAM(S): 15 TABLET, FILM COATED ORAL at 18:10

## 2021-11-15 RX ADMIN — Medication 25 MILLIGRAM(S): at 07:44

## 2021-11-15 RX ADMIN — LISINOPRIL 10 MILLIGRAM(S): 2.5 TABLET ORAL at 07:44

## 2021-11-15 NOTE — BH TREATMENT PLAN - NSTXPATIENTPARTICIPATE_PSY_ALL_CORE
No, patient unwilling to participate
Patient participated in identification of needs/problems/goals for treatment

## 2021-11-15 NOTE — BH SCALES AND SCREENS - NSBPRSCONCDISORG_PSY_ALL_CORE
4 = Moderate - e.g., occasional irrelevant statements, infrequent use of neologisms, or moderate loosening of associations
3 = Mild - e.g., frequently vague, but the interview is able to progress smoothly
4 = Moderate - e.g., occasional irrelevant statements, infrequent use of neologisms, or moderate loosening of associations
4 = Moderate - e.g., occasional irrelevant statements, infrequent use of neologisms, or moderate loosening of associations

## 2021-11-15 NOTE — BH SCALES AND SCREENS - NSBPRSGRANDIOS_PSY_ALL_CORE
1 = Not Reported (Not Present)
4 = Moderate – e.g., inflated self-esteem clearly out of proportion to the circumstance, or suspected grandiose delusion(s)

## 2021-11-15 NOTE — BH SCALES AND SCREENS - NSBPRSBLUNTAFFECT_PSY_ALL_CORE
1 = Not Observed (Not Present)
1 = Not Observed (Not Present)
3 = Mild – e.g., somewhat diminished facial expression, or somewhat monotonous voice or somewhat restricted gestures
1 = Not Observed (Not Present)

## 2021-11-15 NOTE — BH SCALES AND SCREENS - NSBPRSUNUTHOCON_PSY_ALL_CORE
4 = Moderate – full delusional conviction, but delusion(s) has little or no influence on behavior
5 = Moderately Severe – full delusional conviction, but delusion(s) has only occasional impact on behavior
5 = Moderately Severe – full delusional conviction, but delusion(s) has only occasional impact on behavior
4 = Moderate – full delusional conviction, but delusion(s) has little or no influence on behavior

## 2021-11-15 NOTE — BH TREATMENT PLAN - NSTXPSYCHOINTERRN_PSY_ALL_CORE
reality orientation provided, redirected pt as needed
Encourage continued compliance with medication. Encourage Pt. to attend group and individual therapies.
Reality testing prn. Assess response to medications.  Limit-setting and redirection prn.

## 2021-11-15 NOTE — BH INPATIENT PSYCHIATRY PROGRESS NOTE - CURRENT MEDICATION
MEDICATIONS  (STANDING):  benztropine 1 milliGRAM(s) Oral two times a day  carbidopa/levodopa  25/100 1 Tablet(s) Oral three times a day  clopidogrel Tablet 75 milliGRAM(s) Oral daily  clotrimazole/betamethasone Cream 1 Application(s) Topical two times a day  diVALproex Sprinkle 750 milliGRAM(s) Oral at bedtime  gabapentin 100 milliGRAM(s) Oral three times a day  latanoprost 0.005% Ophthalmic Solution 1 Drop(s) Both EYES at bedtime  levothyroxine 75 MICROGram(s) Oral <User Schedule>  lisinopril 10 milliGRAM(s) Oral daily  LORazepam     Tablet 1 milliGRAM(s) Oral three times a day  metFORMIN 500 milliGRAM(s) Oral daily  metoprolol tartrate 25 milliGRAM(s) Oral two times a day  multivitamin/minerals 1 Tablet(s) Oral daily  polyethylene glycol 3350 8.5 Gram(s) Oral daily  risperiDONE   Tablet 0.5 milliGRAM(s) Oral two times a day  senna 2 Tablet(s) Oral at bedtime  traZODone 50 milliGRAM(s) Oral at bedtime    MEDICATIONS  (PRN):  ammonium lactate 12% Lotion 1 Application(s) Topical two times a day PRN dry skin  hemorrhoidal Ointment 1 Application(s) Rectal every 6 hours PRN hemorrhoid discomfort  lactulose Syrup 10 Gram(s) Oral at bedtime PRN constipation  LORazepam     Tablet 1 milliGRAM(s) Oral two times a day PRN Agitation  nystatin Powder 1 Application(s) Topical two times a day PRN Fungal rash  OLANZapine Injectable 2.5 milliGRAM(s) IntraMuscular once PRN severe agitation  traZODone 25 milliGRAM(s) Oral at bedtime PRN insomnia

## 2021-11-15 NOTE — BH TREATMENT PLAN - NSTXFALLGOAL_PSY_ALL_CORE
Use non-slip footwear when out of bed
Call for assistance before getting out of bed or chair
Use non-slip footwear when out of bed

## 2021-11-15 NOTE — BH TREATMENT PLAN - NSTXDCOTHRGOAL_PSY_ALL_CORE
Pt. will comply with treatment recommendations within seven days.
Pt will refrain from aggressive behavior and comply with medication
Pt demonstrated aggressive behavior.
Pt demonstrated aggressive behavior.
Pt will refrain from aggressive behavior and comply with medication

## 2021-11-15 NOTE — BH INPATIENT PSYCHIATRY PROGRESS NOTE - NSBHFUPINTERVALHXFT_PSY_A_CORE
Patient seen for follow-up for psychosis. Chart reviewed and case discussed with nursing staff. Per staff, Pt remains intrusive, irritable, easily agitated. Pt received PRN meds for agitation. She is partially compliant with meds. On exam, patient is fixated on her son dying and money being stolen.  She is disorganized and dysarthric due to denture. Denies any SI, HI. In the past has reported pain in  area, was previously seen by OB-Gyn attending on 10/22/2021 and found to have a grade 2 cystocele without bleeding or discharge.    Discontinued Seroquel. Risperdal 0.5 mg po bid started.     Patient seen for follow-up for psychosis. Chart reviewed and case discussed with nursing staff. Per staff, Pt remains intrusive, irritable, easily agitated. Pt received PRN ativan for agitation. She is partially compliant with meds. On exam, patient is fixated on her son dying and money being stolen.  She is disorganized and dysarthric due to denture. Denies any SI, HI. In the past has reported pain in  area, was previously seen by OB-Gyn attending on 10/22/2021 and found to have a grade 2 cystocele without bleeding or discharge.    Discontinued Seroquel. Risperdal 0.5 mg po bid started.

## 2021-11-15 NOTE — BH INPATIENT PSYCHIATRY PROGRESS NOTE - NSBHCHARTREVIEWVS_PSY_A_CORE FT
Vital Signs Last 24 Hrs  T(C): 36.7 (11-15-21 @ 07:49), Max: 36.7 (11-15-21 @ 07:49)  T(F): 98.1 (11-15-21 @ 07:49), Max: 98.1 (11-15-21 @ 07:49)  HR: 90 (11-15-21 @ 07:49) (90 - 90)  BP: 129/90 (11-15-21 @ 07:49) (129/90 - 129/90)  BP(mean): --  RR: 17 (11-15-21 @ 07:49) (17 - 17)  SpO2: --    Orthostatic VS  11-14-21 @ 20:10  Lying BP: --/-- HR: --  Sitting BP: 131/76 HR: 111  Standing BP: --/-- HR: --  Site: --  Mode: --  Orthostatic VS  11-13-21 @ 20:25  Lying BP: --/-- HR: --  Sitting BP: 143/90 HR: 98  Standing BP: --/-- HR: --  Site: --  Mode: --

## 2021-11-15 NOTE — BH TREATMENT PLAN - NSTXDISORGINTERRN_PSY_ALL_CORE
redirected pt as needed, provided reality orientation
Reality orinetation and redirection.
Encourage continued compliance with medication.
Reality testing and redirection prn. Reinforce treatment plan. Redirect to quieter areas of the unit as needed to decrease stimulation.

## 2021-11-15 NOTE — BH TREATMENT PLAN - NSTXDISORGINTERPR_PSY_ALL_CORE
Writer met with patient in order to discuss progress towards Psychiatric Rehabilitation goals over the past week to which patient has demonstrated minimal progress in the last 7 days. Though patient is better able to tolerate longer durations of conversation, she remains overall disorganized, circumstantial and with loose associations. She can respond to simple questions appropriately, though often demonstrates flight of ideas following. Writer will continue to engage patient daily in order to maintain rapport, provide support and assist patient in demonstrating progress towards Psychiatric Rehabilitation goals.
Writer met with patient in order to discuss progress towards Psychiatric Rehabilitation goals over the past week to which patient has demonstrated partial progress. Patient is able to tolerate several minutes of pleasant conversation with the writer, although thoughts are unrelated and disorganized. Writer will continue to engage patient daily in order to develop rapport, provide support and assist patient in demonstrating progress towards Psychiatric Rehabilitation goals over the next 7 days.
Due to sunrise application being down, progress note is being submitted late. Writer met with patient in order to discuss progress towards Psychiatric Rehabilitation goals to which patient's progress appears to be worsening over the past week. Patient is increasingly disorganized, delusional and with loose associations. Patient is difficult to redirect in conversation. Writer will continue to engage patient daily in order to provide support and assist patient in demonstrating progress towards Psychiatric Rehabilitation goals over the nex 7 days.
Writer met with patient in order to discuss progress towards Psychiatric Rehabilitation goals over the past week to which patient has demonstrated some progress. While patient remains overall disorganized, she is increasingly related and better able to tolerate conversation. She is increasingly redirectable during conversation. Writer will continue to engage patient daily in order to develop rapport, provide support and assist patient in demonstrating progress towards Psychiatric Rehabilitation goals over the next 7 days.

## 2021-11-15 NOTE — BH TREATMENT PLAN - NSTXPROBDISORG_PSY_ALL_CORE
DISORGANIZATION OF THOUGHT/BEHAVIOR

## 2021-11-15 NOTE — BH TREATMENT PLAN - NSTXDISORGGOAL_PSY_ALL_CORE
Will demonstrate related thoughts for 5 min in conversation
Will demonstrate related thoughts for 5 min in conversation
Will demonstrate the ability to maintain reality orientation and communicate clearly with others during 2 conversations with staff daily
Will demonstrate related thoughts for 5 min in conversation
Will demonstrate related thoughts for 5 min in conversation

## 2021-11-15 NOTE — BH SCALES AND SCREENS - NSBPRSSUSPIC_PSY_ALL_CORE
4 = Moderate – more frequent suspiciousness, or transient ideas of reference
3 = Mild – occasional instances of suspiciousness that are definitely not warranted by the situation

## 2021-11-15 NOTE — BH TREATMENT PLAN - NSTXPLANTHERAPYSESSIONSFT_PSY_ALL_CORE
10-20-21  Type of therapy: Health and fitness,Leisure development,Music therapy  Type of session: Individual  Level of patient participation: Participates  Duration of participation: Less than 15 minutes  Therapy conducted by: Psych rehab  Therapy Summary: Writer engaged patient in a brief check in. Due to disorganization, patient is unable to tolerate meaningful conversation at this time. Patient was pleasant and cooperative during the brief interaction. Writer provided empathy, support and encouragement. Patient has been compliant with standing medications over the past week, although endorses minimal clinically significant improvements in regards to symptoms at this time. Though patient was calm and cooperative with the writer, patient is intermittently verbally abuse with staff and remains uncooperative at times. Patient contniues to be maintained on CO 1:1 for disorganization. Patient remains fairly agitated with overall improvements in frequency of agitation. Patient continues to endorse mood lability. Writer was unable to assess for SI/HI, AH/VH or obtain a CGI self-rating as patient was unable to appropriately respond to questioning.     Patient is visible on the unit, intermittently interactive with peers and has maintained intermittent behavioral control. Though pleasant with the writer during the interaction, patient can be verbally abusive towards staff at times. Patient has been present for some of the leisure groups offered over the past week however, patient's ability meaningfully participate in group activities/tolerate group duration is inhibited by severity of symptoms at this time.  
  11-11-21  Type of therapy: Coping skills,Leisure development,Music therapy,Peer advocate,Spirituality,Symptom management  Type of session: Individual  Level of patient participation: Disruptive  Duration of participation: Less than 15 minutes  Therapy conducted by: Psych rehab  Therapy Summary: Writer engaged patient in a brief check in as patient is unable to tolerate meaningful conversation at this time. During the brief interaction, writer provided empathy, support and encouragement. Patient has been compliant with standing medications over the past week, though appears to endorse worsening symptoms. Patient is increasingly disorganized, intrusive, disrupting and bizarre. Patient endorses delusional beliefs that other patients on the unit are her children and is particularly intrusive with these patients. Patient can become agitated/aggressive at times. Patient continues to endorse mood lability, paranoia and poor reality testing. Patient was unable to appropriately respond to questioning and therefore, could not be assessed for SI/HI, AH/VH or provide a CGI self-rating.     Patient is visble on the unit, intrusive with peers and requires support from staff to maintain behavioral control. Patient has attended a few of the Psychiatric Rehabilitation groups offered over the past week, though is often in and out and requires redirection in the context of disorganization.  
  10-27-21  Type of therapy: Creative arts therapy,Health and fitness,Leisure development,Peer advocate,Symptom management  Type of session: Individual  Level of patient participation: Participates  Duration of participation: Less than 15 minutes  Therapy conducted by: Psych rehab  Therapy Summary: Writer and patient discussed level of functioning and addressed concerns surrounding the current hospitalization. Patient opened up about interpersonal distress within relationship with sister. Writer provided empathy, support and encouragement. Patient has been consistently compliant with standing medications over the past week and endorses some improvements in regards to symptoms including behavioral control, aggression, and agitation. Patient is no longer being maintained on CO. Patient continues to endorse some intermittently irritability, disorganization, and bizarre behavior. Per chart patient endorses AH, though denies VH, SI/HI.     Patient is visible on the unit and is intermittently interactive with peers over the past week. Patient's behavioral control is improving. Patient is increasingly cooperative with staff. Patient has attended some of the Psychiatric Rehabilitation groups offered over the past week. Patient is increasingly engaged. Patient requires some redirection, though is increasingly receptive to redirection when needed.  
  11-03-21  Type of therapy: Leisure development,Mindfulness,Music therapy,Symptom management  Type of session: Individual  Level of patient participation: Participates  Duration of participation: 15 minutes  Therapy conducted by: Psych rehab  Therapy Summary: Writer and patient discussed level of functioning amd addressed concerns surrounding the current hospitalization/discharge. Patient remains overall disorganized, though discussed ongoing concerns surrounding where her money is, the wellbeing of her daughter (believes another patient is her daughter) and interpersonal conflict with her sister. Patient's reality testing is impaired at this time. During the session, writer provided empathy, support and encouragement. Patient has been overall compliant with standing medications over the past week, though has refused medications on a few occassions. Patient endorses minimal improvements in regards to symptoms. Patient is somewhat calmer, less aggressive and less agitated. Patient is increasingly redirectable. Patient continues to endorse disorganization, intermittent irritability, paranoia, delusional beliefs, thought broadcasting, and impaired concentration. Patient was unable to be assessed for SI/HI, AH/VH or a CGI self-rating.     Patient is visible on the unit and intermittently interactive with select peers. Patient is intrusive with another patient whom she believes is her daughter and sometimes requires redirection during these select interactions. Patient's behavioral control is improving. Patient is verbal, polite and fairly cooperative with staff. Patient has attended some of the Psychiatric Rehabilitation groups offered over the past week. Patient is increasingly engaged in leisure activities. She sometimes requires some redirection due to ongoing disorganization. Patient sometimes struggles to tolerate group duration.

## 2021-11-15 NOTE — BH TREATMENT PLAN - NSTXFALLINTERRN_PSY_ALL_CORE
Monitored the gait and stability.
instructed pt to call for assistance when oob, provided assistance with ADLs
Educate Pt. to call for assistance when needed. Provide non-slip socks for Pt. when out of bed.
Maintain falls precautions. reinforce consistent use of non-skid socks when out of bed.

## 2021-11-15 NOTE — BH INPATIENT PSYCHIATRY PROGRESS NOTE - NSBHMETABOLIC_PSY_ALL_CORE_FT
BMI: BMI (kg/m2): 25.2 (11-06-21 @ 15:58)  HbA1c: A1C with Estimated Average Glucose Result: 5.6 % (10-15-21 @ 11:00)    Glucose: POCT Blood Glucose.: 61 mg/dL (10-19-21 @ 11:59)    BP: 129/90 (11-15-21 @ 07:49) (129/90 - 129/90)  Lipid Panel: Date/Time: 10-14-21 @ 09:38  Cholesterol, Serum: 159  Direct LDL: --  HDL Cholesterol, Serum: 66  Total Cholesterol/HDL Ration Measurement: --  Triglycerides, Serum: 100

## 2021-11-15 NOTE — BH TREATMENT PLAN - NSCMSPTSTRENGTHS_PSY_ALL_CORE
Assertive/Supportive family
Patient reports none

## 2021-11-15 NOTE — BH SCALES AND SCREENS - NSBPRSHALLBEH_PSY_ALL_CORE
4 = Moderate – as above, but more frequent or extensive (e.g. frequently sees the devil’s face, two voices carry on lengthy conversations)

## 2021-11-15 NOTE — BH TREATMENT PLAN - NSTXPSYCHOGOAL_PSY_ALL_CORE
Will report using relaxation skills 3 times a day to reduce anxiety about delusions/hallucinations
Will ask for PRN medication to manage hallucinations
Will report using relaxation skills 3 times a day to reduce anxiety about delusions/hallucinations

## 2021-11-15 NOTE — BH SCALES AND SCREENS - NSBHSCALESCRN_PSY_ALL_CORE
Brief Psychiatric Rating Scale (BPRS)

## 2021-11-15 NOTE — BH INPATIENT PSYCHIATRY PROGRESS NOTE - NSBHASSESSSUMMFT_PSY_ALL_CORE
Pt is a 71 y/o AAF, domiciled at [facility], long hx of Schizophrenia with multiple past inpatient hospitalization, last hospitalization as per psyckes 5/2018 at [facility], past hx of aggression towards staff and peers, no documented hx of SA, pertinent PMH includes HLD, HTN, Parkinson's dx. Seizure d/o, T2DM, Hypothyroidism, Glaucoma, PVD, Asthma, Bladder Ca, BIB EMS activated by sister for agitation and worsening hallucination. Pt was medically seen found to have a UTI  exam, once medically cleared sent to [facility] for further management.    11/13: Patient seen for follow-up for psychosis. Chart reviewed and case discussed with interdisciplinary staff. Pt seen in the dining area, under good behavioral control. No PRNs needed/ Reports her mood "fluctuates". Denies difficulty sleeping, anxiety, SIIP. Complains about pureed diet. In the past has reported pain in  area, was previously seen by OB-Gyn attending on 10/22/2021 and found to have a grade 2 cystocele without bleeding or discharge.    11/14: easily irritable, paranoid, illogical, disorganized, received PRN ativan yesterday and today, not compliant with most meds today. No SI/HI.    Plan: Will continue current medication management.

## 2021-11-15 NOTE — BH TREATMENT PLAN - NSTXCAREGIVERPARTICIPATE_PSY_P_CORE
Family/Caregiver participated in identification of needs/problems/goals for treatment

## 2021-11-15 NOTE — BH SCALES AND SCREENS - NSBPRSMANNPOST_PSY_ALL_CORE
1 = Not Observed (Not present)
1 = Not Observed (Not present)
4 = Moderate – e.g., assumes yoga position for a brief period of time, infrequent tongue protrusions, rocking
1 = Not Observed (Not present)

## 2021-11-16 ENCOUNTER — FORM ENCOUNTER (OUTPATIENT)
Age: 71
End: 2021-11-16

## 2021-11-16 PROCEDURE — 99232 SBSQ HOSP IP/OBS MODERATE 35: CPT

## 2021-11-16 RX ORDER — GABAPENTIN 400 MG/1
300 CAPSULE ORAL THREE TIMES A DAY
Refills: 0 | Status: DISCONTINUED | OUTPATIENT
Start: 2021-11-16 | End: 2021-11-17

## 2021-11-16 RX ORDER — RISPERIDONE 4 MG/1
1 TABLET ORAL
Refills: 0 | Status: DISCONTINUED | OUTPATIENT
Start: 2021-11-16 | End: 2021-11-17

## 2021-11-16 RX ADMIN — Medication 1 MILLIGRAM(S): at 20:48

## 2021-11-16 RX ADMIN — CARBIDOPA AND LEVODOPA 1 TABLET(S): 25; 100 TABLET ORAL at 14:29

## 2021-11-16 RX ADMIN — CLOPIDOGREL BISULFATE 75 MILLIGRAM(S): 75 TABLET, FILM COATED ORAL at 08:32

## 2021-11-16 RX ADMIN — Medication 25 MILLIGRAM(S): at 20:48

## 2021-11-16 RX ADMIN — GABAPENTIN 300 MILLIGRAM(S): 400 CAPSULE ORAL at 08:32

## 2021-11-16 RX ADMIN — DIVALPROEX SODIUM 750 MILLIGRAM(S): 500 TABLET, DELAYED RELEASE ORAL at 20:45

## 2021-11-16 RX ADMIN — GABAPENTIN 300 MILLIGRAM(S): 400 CAPSULE ORAL at 20:47

## 2021-11-16 RX ADMIN — Medication 75 MICROGRAM(S): at 05:46

## 2021-11-16 RX ADMIN — Medication 1 MILLIGRAM(S): at 08:32

## 2021-11-16 RX ADMIN — CARBIDOPA AND LEVODOPA 1 TABLET(S): 25; 100 TABLET ORAL at 20:48

## 2021-11-16 RX ADMIN — GABAPENTIN 300 MILLIGRAM(S): 400 CAPSULE ORAL at 14:28

## 2021-11-16 RX ADMIN — Medication 50 MILLIGRAM(S): at 20:47

## 2021-11-16 RX ADMIN — CARBIDOPA AND LEVODOPA 1 TABLET(S): 25; 100 TABLET ORAL at 08:33

## 2021-11-16 RX ADMIN — CLOTRIMAZOLE AND BETAMETHASONE DIPROPIONATE 1 APPLICATION(S): 10; .5 CREAM TOPICAL at 20:50

## 2021-11-16 RX ADMIN — RISPERIDONE 1 MILLIGRAM(S): 4 TABLET ORAL at 08:42

## 2021-11-16 RX ADMIN — Medication 1 MILLIGRAM(S): at 20:49

## 2021-11-16 RX ADMIN — METFORMIN HYDROCHLORIDE 500 MILLIGRAM(S): 850 TABLET ORAL at 08:31

## 2021-11-16 RX ADMIN — Medication 1 TABLET(S): at 08:43

## 2021-11-16 RX ADMIN — LATANOPROST 1 DROP(S): 0.05 SOLUTION/ DROPS OPHTHALMIC; TOPICAL at 20:49

## 2021-11-16 RX ADMIN — Medication 1 MILLIGRAM(S): at 14:29

## 2021-11-16 RX ADMIN — SENNA PLUS 2 TABLET(S): 8.6 TABLET ORAL at 20:46

## 2021-11-16 RX ADMIN — RISPERIDONE 1 MILLIGRAM(S): 4 TABLET ORAL at 20:49

## 2021-11-16 RX ADMIN — CLOTRIMAZOLE AND BETAMETHASONE DIPROPIONATE 1 APPLICATION(S): 10; .5 CREAM TOPICAL at 08:31

## 2021-11-16 NOTE — BH INPATIENT PSYCHIATRY PROGRESS NOTE - NSBHASSESSSUMMFT_PSY_ALL_CORE
Pt is a 69 y/o AAF, domiciled at [facility], long hx of Schizophrenia with multiple past inpatient hospitalization, last hospitalization as per psyckes 5/2018 at [facility], past hx of aggression towards staff and peers, no documented hx of SA, pertinent PMH includes HLD, HTN, Parkinson's dx. Seizure d/o, T2DM, Hypothyroidism, Glaucoma, PVD, Asthma, Bladder Ca, BIB EMS activated by sister for agitation and worsening hallucination. Pt was medically seen found to have a UTI  exam, once medically cleared sent to [facility] for further management.    11/13: Patient seen for follow-up for psychosis. Chart reviewed and case discussed with interdisciplinary staff. Pt seen in the dining area, under good behavioral control. No PRNs needed/ Reports her mood "fluctuates". Denies difficulty sleeping, anxiety, SIIP. Complains about pureed diet. In the past has reported pain in  area, was previously seen by OB-Gyn attending on 10/22/2021 and found to have a grade 2 cystocele without bleeding or discharge.    11/14: easily irritable, paranoid, illogical, disorganized, received PRN ativan yesterday and today, not compliant with most meds today. No SI/HI.    Plan: Will continue current medication management.

## 2021-11-16 NOTE — BH INPATIENT PSYCHIATRY PROGRESS NOTE - CURRENT MEDICATION
MEDICATIONS  (STANDING):  benztropine 1 milliGRAM(s) Oral two times a day  carbidopa/levodopa  25/100 1 Tablet(s) Oral three times a day  clopidogrel Tablet 75 milliGRAM(s) Oral daily  clotrimazole/betamethasone Cream 1 Application(s) Topical two times a day  diVALproex Sprinkle 750 milliGRAM(s) Oral at bedtime  gabapentin 300 milliGRAM(s) Oral three times a day  latanoprost 0.005% Ophthalmic Solution 1 Drop(s) Both EYES at bedtime  levothyroxine 75 MICROGram(s) Oral <User Schedule>  lisinopril 10 milliGRAM(s) Oral daily  LORazepam     Tablet 1 milliGRAM(s) Oral three times a day  metFORMIN 500 milliGRAM(s) Oral daily  metoprolol tartrate 25 milliGRAM(s) Oral two times a day  multivitamin/minerals 1 Tablet(s) Oral daily  polyethylene glycol 3350 8.5 Gram(s) Oral daily  risperiDONE   Tablet 1 milliGRAM(s) Oral two times a day  senna 2 Tablet(s) Oral at bedtime  traZODone 50 milliGRAM(s) Oral at bedtime    MEDICATIONS  (PRN):  ammonium lactate 12% Lotion 1 Application(s) Topical two times a day PRN dry skin  hemorrhoidal Ointment 1 Application(s) Rectal every 6 hours PRN hemorrhoid discomfort  lactulose Syrup 10 Gram(s) Oral at bedtime PRN constipation  LORazepam     Tablet 1 milliGRAM(s) Oral two times a day PRN Agitation  nystatin Powder 1 Application(s) Topical two times a day PRN Fungal rash  OLANZapine Injectable 2.5 milliGRAM(s) IntraMuscular once PRN severe agitation  traZODone 25 milliGRAM(s) Oral at bedtime PRN insomnia

## 2021-11-16 NOTE — BH INPATIENT PSYCHIATRY PROGRESS NOTE - NSBHFUPINTERVALCCFT_PSY_A_CORE
Patient is seen for follow-up for psychosis. Patient is guarded and withdraw. Does not answer questions and starts crying. Patient is seen for follow-up for psychosis. "Alright."

## 2021-11-16 NOTE — BH INPATIENT PSYCHIATRY PROGRESS NOTE - NSBHCHARTREVIEWVS_PSY_A_CORE FT
Vital Signs Last 24 Hrs  T(C): 37.2 (11-15-21 @ 15:51), Max: 37.2 (11-15-21 @ 15:51)  T(F): 98.9 (11-15-21 @ 15:51), Max: 98.9 (11-15-21 @ 15:51)  HR: --  BP: --  BP(mean): --  RR: --  SpO2: --    Orthostatic VS  11-14-21 @ 20:10  Lying BP: --/-- HR: --  Sitting BP: 131/76 HR: 111  Standing BP: --/-- HR: --  Site: --  Mode: --

## 2021-11-16 NOTE — BH INPATIENT PSYCHIATRY PROGRESS NOTE - NSBHFUPINTERVALHXFT_PSY_A_CORE
Patient seen for follow-up for psychosis. Chart reviewed and case discussed with nursing staff. Per staff, Pt remains intrusive, irritable, easily agitated. She is labile at times. Pt received PRN ativan for agitation. She is partially compliant with meds. On exam, patient is observed in day room, standing with her head down. Does not respond to questions and starts crying intermittently. Speech is disorganized and dysarthric due to denture. Denies any SI, HI.     Risperdal increased to 1 mg po BID and Neurontin increased to 300 mg po TID.     Patient seen for follow-up for psychosis. Chart reviewed and case discussed with nursing staff. Per staff, Pt remains intrusive, irritable, easily agitated. She is labile at times. Pt received PRN ativan for agitation. She is partially compliant with meds. On exam, patient is observed in day room, standing with her head down, and withdrawn. Does not respond to questions and starts crying intermittently. Speech is disorganized and dysarthric due to denture. Denies any SI, HI.     Risperdal increased to 1 mg po BID and Neurontin increased to 300 mg po TID.

## 2021-11-17 PROCEDURE — 99232 SBSQ HOSP IP/OBS MODERATE 35: CPT

## 2021-11-17 RX ORDER — GABAPENTIN 400 MG/1
400 CAPSULE ORAL
Refills: 0 | Status: DISCONTINUED | OUTPATIENT
Start: 2021-11-17 | End: 2021-11-20

## 2021-11-17 RX ORDER — RISPERIDONE 4 MG/1
1 TABLET ORAL
Refills: 0 | Status: DISCONTINUED | OUTPATIENT
Start: 2021-11-17 | End: 2021-11-19

## 2021-11-17 RX ORDER — GABAPENTIN 400 MG/1
400 CAPSULE ORAL THREE TIMES A DAY
Refills: 0 | Status: DISCONTINUED | OUTPATIENT
Start: 2021-11-17 | End: 2021-11-17

## 2021-11-17 RX ADMIN — CLOPIDOGREL BISULFATE 75 MILLIGRAM(S): 75 TABLET, FILM COATED ORAL at 09:06

## 2021-11-17 RX ADMIN — GABAPENTIN 400 MILLIGRAM(S): 400 CAPSULE ORAL at 09:04

## 2021-11-17 RX ADMIN — Medication 25 MILLIGRAM(S): at 20:30

## 2021-11-17 RX ADMIN — SENNA PLUS 2 TABLET(S): 8.6 TABLET ORAL at 20:29

## 2021-11-17 RX ADMIN — Medication 1 TABLET(S): at 09:07

## 2021-11-17 RX ADMIN — CARBIDOPA AND LEVODOPA 1 TABLET(S): 25; 100 TABLET ORAL at 20:29

## 2021-11-17 RX ADMIN — Medication 1 MILLIGRAM(S): at 20:29

## 2021-11-17 RX ADMIN — GABAPENTIN 400 MILLIGRAM(S): 400 CAPSULE ORAL at 20:29

## 2021-11-17 RX ADMIN — RISPERIDONE 1 MILLIGRAM(S): 4 TABLET ORAL at 09:03

## 2021-11-17 RX ADMIN — Medication 50 MILLIGRAM(S): at 20:29

## 2021-11-17 RX ADMIN — Medication 1 MILLIGRAM(S): at 10:47

## 2021-11-17 RX ADMIN — LISINOPRIL 10 MILLIGRAM(S): 2.5 TABLET ORAL at 10:05

## 2021-11-17 RX ADMIN — Medication 75 MICROGRAM(S): at 06:39

## 2021-11-17 RX ADMIN — METFORMIN HYDROCHLORIDE 500 MILLIGRAM(S): 850 TABLET ORAL at 09:08

## 2021-11-17 RX ADMIN — GABAPENTIN 400 MILLIGRAM(S): 400 CAPSULE ORAL at 14:55

## 2021-11-17 RX ADMIN — Medication 25 MILLIGRAM(S): at 10:47

## 2021-11-17 RX ADMIN — DIVALPROEX SODIUM 750 MILLIGRAM(S): 500 TABLET, DELAYED RELEASE ORAL at 20:30

## 2021-11-17 RX ADMIN — CARBIDOPA AND LEVODOPA 1 TABLET(S): 25; 100 TABLET ORAL at 09:04

## 2021-11-17 RX ADMIN — RISPERIDONE 1 MILLIGRAM(S): 4 TABLET ORAL at 20:29

## 2021-11-17 RX ADMIN — Medication 1 MILLIGRAM(S): at 14:54

## 2021-11-17 RX ADMIN — CARBIDOPA AND LEVODOPA 1 TABLET(S): 25; 100 TABLET ORAL at 14:54

## 2021-11-17 RX ADMIN — LATANOPROST 1 DROP(S): 0.05 SOLUTION/ DROPS OPHTHALMIC; TOPICAL at 20:30

## 2021-11-17 RX ADMIN — Medication 1 MILLIGRAM(S): at 16:53

## 2021-11-17 NOTE — BH INPATIENT PSYCHIATRY PROGRESS NOTE - CURRENT MEDICATION
MEDICATIONS  (STANDING):  benztropine 1 milliGRAM(s) Oral two times a day  carbidopa/levodopa  25/100 1 Tablet(s) Oral three times a day  clopidogrel Tablet 75 milliGRAM(s) Oral daily  clotrimazole/betamethasone Cream 1 Application(s) Topical two times a day  diVALproex Sprinkle 750 milliGRAM(s) Oral at bedtime  gabapentin 400 milliGRAM(s) Oral three times a day  latanoprost 0.005% Ophthalmic Solution 1 Drop(s) Both EYES at bedtime  levothyroxine 75 MICROGram(s) Oral <User Schedule>  lisinopril 10 milliGRAM(s) Oral daily  LORazepam     Tablet 1 milliGRAM(s) Oral three times a day  metFORMIN 500 milliGRAM(s) Oral daily  metoprolol tartrate 25 milliGRAM(s) Oral two times a day  multivitamin/minerals 1 Tablet(s) Oral daily  polyethylene glycol 3350 8.5 Gram(s) Oral daily  risperiDONE   Tablet 1 milliGRAM(s) Oral two times a day  senna 2 Tablet(s) Oral at bedtime  traZODone 50 milliGRAM(s) Oral at bedtime    MEDICATIONS  (PRN):  ammonium lactate 12% Lotion 1 Application(s) Topical two times a day PRN dry skin  hemorrhoidal Ointment 1 Application(s) Rectal every 6 hours PRN hemorrhoid discomfort  lactulose Syrup 10 Gram(s) Oral at bedtime PRN constipation  LORazepam     Tablet 1 milliGRAM(s) Oral two times a day PRN Agitation  nystatin Powder 1 Application(s) Topical two times a day PRN Fungal rash  OLANZapine Injectable 2.5 milliGRAM(s) IntraMuscular once PRN severe agitation  traZODone 25 milliGRAM(s) Oral at bedtime PRN insomnia   MEDICATIONS  (STANDING):  benztropine 1 milliGRAM(s) Oral two times a day  carbidopa/levodopa  25/100 1 Tablet(s) Oral three times a day  clopidogrel Tablet 75 milliGRAM(s) Oral daily  clotrimazole/betamethasone Cream 1 Application(s) Topical two times a day  diVALproex Sprinkle 750 milliGRAM(s) Oral at bedtime  gabapentin 400 milliGRAM(s) Oral <User Schedule>  latanoprost 0.005% Ophthalmic Solution 1 Drop(s) Both EYES at bedtime  levothyroxine 75 MICROGram(s) Oral <User Schedule>  lisinopril 10 milliGRAM(s) Oral daily  LORazepam     Tablet 1 milliGRAM(s) Oral <User Schedule>  metFORMIN 500 milliGRAM(s) Oral daily  metoprolol tartrate 25 milliGRAM(s) Oral two times a day  multivitamin/minerals 1 Tablet(s) Oral daily  polyethylene glycol 3350 8.5 Gram(s) Oral daily  risperiDONE   Tablet 1 milliGRAM(s) Oral <User Schedule>  senna 2 Tablet(s) Oral at bedtime  traZODone 50 milliGRAM(s) Oral at bedtime    MEDICATIONS  (PRN):  ammonium lactate 12% Lotion 1 Application(s) Topical two times a day PRN dry skin  hemorrhoidal Ointment 1 Application(s) Rectal every 6 hours PRN hemorrhoid discomfort  lactulose Syrup 10 Gram(s) Oral at bedtime PRN constipation  LORazepam     Tablet 1 milliGRAM(s) Oral two times a day PRN Agitation  nystatin Powder 1 Application(s) Topical two times a day PRN Fungal rash  OLANZapine Injectable 2.5 milliGRAM(s) IntraMuscular once PRN severe agitation  traZODone 25 milliGRAM(s) Oral at bedtime PRN insomnia

## 2021-11-17 NOTE — BH INPATIENT PSYCHIATRY PROGRESS NOTE - NSBHCHARTREVIEWVS_PSY_A_CORE FT
Vital Signs Last 24 Hrs  T(C): 25 (11-16-21 @ 20:28), Max: 37.2 (11-16-21 @ 15:41)  T(F): 77 (11-16-21 @ 20:28), Max: 98.9 (11-16-21 @ 15:41)  HR: --  BP: 146/83 (11-16-21 @ 20:28) (146/83 - 146/83)  BP(mean): --  RR: --  SpO2: --     Vital Signs Last 24 Hrs  T(C): 25 (11-16-21 @ 20:28), Max: 37.2 (11-16-21 @ 15:41)  T(F): 77 (11-16-21 @ 20:28), Max: 98.9 (11-16-21 @ 15:41)  HR: --  BP: 141/78 (11-17-21 @ 10:03) (141/78 - 146/83)  BP(mean): --  RR: --  SpO2: --

## 2021-11-17 NOTE — BH INPATIENT PSYCHIATRY PROGRESS NOTE - NSBHFUPINTERVALHXFT_PSY_A_CORE
Patient seen for follow-up for psychosis. Chart reviewed and case discussed with nursing staff. Per staff, Pt remains intrusive, irritable, easily agitated. She is labile at times. Pt received PRN ativan for agitation. On exam, patient is observed in day room, walking to people, and interrupting them. She is grossly psychotic, disorganized, and intrusive. Speech is disorganized and dysarthric due to denture. Denies any SI, HI.     Neurontin increased to 400 mg po TID.

## 2021-11-17 NOTE — BH INPATIENT PSYCHIATRY PROGRESS NOTE - NSBHMETABOLIC_PSY_ALL_CORE_FT
BMI: BMI (kg/m2): 25.2 (11-06-21 @ 15:58)  HbA1c: A1C with Estimated Average Glucose Result: 5.6 % (10-15-21 @ 11:00)    Glucose: POCT Blood Glucose.: 61 mg/dL (10-19-21 @ 11:59)    BP: 146/83 (11-16-21 @ 20:28) (129/90 - 146/83)  Lipid Panel: Date/Time: 10-14-21 @ 09:38  Cholesterol, Serum: 159  Direct LDL: --  HDL Cholesterol, Serum: 66  Total Cholesterol/HDL Ration Measurement: --  Triglycerides, Serum: 100   BMI: BMI (kg/m2): 25.2 (11-06-21 @ 15:58)  HbA1c: A1C with Estimated Average Glucose Result: 5.6 % (10-15-21 @ 11:00)    Glucose: POCT Blood Glucose.: 61 mg/dL (10-19-21 @ 11:59)    BP: 141/78 (11-17-21 @ 10:03) (129/90 - 146/83)  Lipid Panel: Date/Time: 10-14-21 @ 09:38  Cholesterol, Serum: 159  Direct LDL: --  HDL Cholesterol, Serum: 66  Total Cholesterol/HDL Ration Measurement: --  Triglycerides, Serum: 100

## 2021-11-17 NOTE — BH INPATIENT PSYCHIATRY PROGRESS NOTE - NSBHASSESSSUMMFT_PSY_ALL_CORE
Pt is a 69 y/o AAF, domiciled at [facility], long hx of Schizophrenia with multiple past inpatient hospitalization, last hospitalization as per psyckes 5/2018 at [facility], past hx of aggression towards staff and peers, no documented hx of SA, pertinent PMH includes HLD, HTN, Parkinson's dx. Seizure d/o, T2DM, Hypothyroidism, Glaucoma, PVD, Asthma, Bladder Ca, BIB EMS activated by sister for agitation and worsening hallucination. Pt was medically seen found to have a UTI  exam, once medically cleared sent to [facility] for further management.    11/13: Patient seen for follow-up for psychosis. Chart reviewed and case discussed with interdisciplinary staff. Pt seen in the dining area, under good behavioral control. No PRNs needed/ Reports her mood "fluctuates". Denies difficulty sleeping, anxiety, SIIP. Complains about pureed diet. In the past has reported pain in  area, was previously seen by OB-Gyn attending on 10/22/2021 and found to have a grade 2 cystocele without bleeding or discharge.  11/14: easily irritable, paranoid, illogical, disorganized, received PRN ativan yesterday and today, not compliant with most meds today. No SI/HI.  11/15: remains disorganized, intrusive, and paranoid. continues to receive PRNs for agitation. No SI/HI.  11/16: easily irritable, agitated, and aggressive. fixated on her son and money being stolen. No SI/HI. Risperdal and Neurontin increased.  11/17: remains disorganized, intrusive, and grossly psychotic. requires PRN meds for agitation. No SI/HI. Neurontin increased.    Plan: Will continue current medication management.

## 2021-11-18 LAB
ALBUMIN SERPL ELPH-MCNC: 3.9 G/DL — SIGNIFICANT CHANGE UP (ref 3.3–5)
ALP SERPL-CCNC: 89 U/L — SIGNIFICANT CHANGE UP (ref 40–120)
ALT FLD-CCNC: 11 U/L — SIGNIFICANT CHANGE UP (ref 4–33)
ANION GAP SERPL CALC-SCNC: 11 MMOL/L — SIGNIFICANT CHANGE UP (ref 7–14)
AST SERPL-CCNC: 21 U/L — SIGNIFICANT CHANGE UP (ref 4–32)
BILIRUB SERPL-MCNC: <0.2 MG/DL — SIGNIFICANT CHANGE UP (ref 0.2–1.2)
BUN SERPL-MCNC: 21 MG/DL — SIGNIFICANT CHANGE UP (ref 7–23)
CALCIUM SERPL-MCNC: 9.7 MG/DL — SIGNIFICANT CHANGE UP (ref 8.4–10.5)
CHLORIDE SERPL-SCNC: 105 MMOL/L — SIGNIFICANT CHANGE UP (ref 98–107)
CO2 SERPL-SCNC: 24 MMOL/L — SIGNIFICANT CHANGE UP (ref 22–31)
CREAT SERPL-MCNC: 0.76 MG/DL — SIGNIFICANT CHANGE UP (ref 0.5–1.3)
GLUCOSE SERPL-MCNC: 95 MG/DL — SIGNIFICANT CHANGE UP (ref 70–99)
HCT VFR BLD CALC: 37 % — SIGNIFICANT CHANGE UP (ref 34.5–45)
HGB BLD-MCNC: 11.5 G/DL — SIGNIFICANT CHANGE UP (ref 11.5–15.5)
MCHC RBC-ENTMCNC: 27.6 PG — SIGNIFICANT CHANGE UP (ref 27–34)
MCHC RBC-ENTMCNC: 31.1 GM/DL — LOW (ref 32–36)
MCV RBC AUTO: 88.9 FL — SIGNIFICANT CHANGE UP (ref 80–100)
NRBC # BLD: 0 /100 WBCS — SIGNIFICANT CHANGE UP
NRBC # FLD: 0 K/UL — SIGNIFICANT CHANGE UP
PLATELET # BLD AUTO: 203 K/UL — SIGNIFICANT CHANGE UP (ref 150–400)
POTASSIUM SERPL-MCNC: 4.2 MMOL/L — SIGNIFICANT CHANGE UP (ref 3.5–5.3)
POTASSIUM SERPL-SCNC: 4.2 MMOL/L — SIGNIFICANT CHANGE UP (ref 3.5–5.3)
PROT SERPL-MCNC: 7.8 G/DL — SIGNIFICANT CHANGE UP (ref 6–8.3)
RBC # BLD: 4.16 M/UL — SIGNIFICANT CHANGE UP (ref 3.8–5.2)
RBC # FLD: 15.9 % — HIGH (ref 10.3–14.5)
SODIUM SERPL-SCNC: 140 MMOL/L — SIGNIFICANT CHANGE UP (ref 135–145)
VALPROATE SERPL-MCNC: 52.4 UG/ML — SIGNIFICANT CHANGE UP (ref 50–100)
WBC # BLD: 4.78 K/UL — SIGNIFICANT CHANGE UP (ref 3.8–10.5)
WBC # FLD AUTO: 4.78 K/UL — SIGNIFICANT CHANGE UP (ref 3.8–10.5)

## 2021-11-18 PROCEDURE — 99232 SBSQ HOSP IP/OBS MODERATE 35: CPT

## 2021-11-18 RX ADMIN — LISINOPRIL 10 MILLIGRAM(S): 2.5 TABLET ORAL at 09:33

## 2021-11-18 RX ADMIN — Medication 50 MILLIGRAM(S): at 21:17

## 2021-11-18 RX ADMIN — CLOTRIMAZOLE AND BETAMETHASONE DIPROPIONATE 1 APPLICATION(S): 10; .5 CREAM TOPICAL at 21:23

## 2021-11-18 RX ADMIN — Medication 1 MILLIGRAM(S): at 06:19

## 2021-11-18 RX ADMIN — Medication 75 MICROGRAM(S): at 06:19

## 2021-11-18 RX ADMIN — CLOPIDOGREL BISULFATE 75 MILLIGRAM(S): 75 TABLET, FILM COATED ORAL at 09:33

## 2021-11-18 RX ADMIN — Medication 1 MILLIGRAM(S): at 21:17

## 2021-11-18 RX ADMIN — GABAPENTIN 400 MILLIGRAM(S): 400 CAPSULE ORAL at 06:19

## 2021-11-18 RX ADMIN — GABAPENTIN 400 MILLIGRAM(S): 400 CAPSULE ORAL at 14:46

## 2021-11-18 RX ADMIN — METFORMIN HYDROCHLORIDE 500 MILLIGRAM(S): 850 TABLET ORAL at 09:33

## 2021-11-18 RX ADMIN — Medication 1 TABLET(S): at 09:33

## 2021-11-18 RX ADMIN — CARBIDOPA AND LEVODOPA 1 TABLET(S): 25; 100 TABLET ORAL at 21:17

## 2021-11-18 RX ADMIN — RISPERIDONE 1 MILLIGRAM(S): 4 TABLET ORAL at 21:19

## 2021-11-18 RX ADMIN — Medication 1 MILLIGRAM(S): at 17:32

## 2021-11-18 RX ADMIN — Medication 1 MILLIGRAM(S): at 14:47

## 2021-11-18 RX ADMIN — CARBIDOPA AND LEVODOPA 1 TABLET(S): 25; 100 TABLET ORAL at 09:33

## 2021-11-18 RX ADMIN — DIVALPROEX SODIUM 750 MILLIGRAM(S): 500 TABLET, DELAYED RELEASE ORAL at 21:22

## 2021-11-18 RX ADMIN — SENNA PLUS 2 TABLET(S): 8.6 TABLET ORAL at 21:17

## 2021-11-18 RX ADMIN — RISPERIDONE 1 MILLIGRAM(S): 4 TABLET ORAL at 06:19

## 2021-11-18 RX ADMIN — GABAPENTIN 400 MILLIGRAM(S): 400 CAPSULE ORAL at 21:21

## 2021-11-18 RX ADMIN — LATANOPROST 1 DROP(S): 0.05 SOLUTION/ DROPS OPHTHALMIC; TOPICAL at 21:23

## 2021-11-18 RX ADMIN — Medication 25 MILLIGRAM(S): at 09:32

## 2021-11-18 RX ADMIN — Medication 1 MILLIGRAM(S): at 09:33

## 2021-11-18 RX ADMIN — Medication 1 MILLIGRAM(S): at 21:20

## 2021-11-18 RX ADMIN — CARBIDOPA AND LEVODOPA 1 TABLET(S): 25; 100 TABLET ORAL at 14:47

## 2021-11-18 RX ADMIN — Medication 25 MILLIGRAM(S): at 21:17

## 2021-11-18 NOTE — BH INPATIENT PSYCHIATRY PROGRESS NOTE - NSBHASSESSSUMMFT_PSY_ALL_CORE
Pt is a 71 y/o AAF, domiciled at [facility], long hx of Schizophrenia with multiple past inpatient hospitalization, last hospitalization as per psyckes 5/2018 at [facility], past hx of aggression towards staff and peers, no documented hx of SA, pertinent PMH includes HLD, HTN, Parkinson's dx. Seizure d/o, T2DM, Hypothyroidism, Glaucoma, PVD, Asthma, Bladder Ca, BIB EMS activated by sister for agitation and worsening hallucination. Pt was medically seen found to have a UTI  exam, once medically cleared sent to [facility] for further management.    11/13: Patient seen for follow-up for psychosis. Chart reviewed and case discussed with interdisciplinary staff. Pt seen in the dining area, under good behavioral control. No PRNs needed/ Reports her mood "fluctuates". Denies difficulty sleeping, anxiety, SIIP. Complains about pureed diet. In the past has reported pain in  area, was previously seen by OB-Gyn attending on 10/22/2021 and found to have a grade 2 cystocele without bleeding or discharge.  11/14: easily irritable, paranoid, illogical, disorganized, received PRN ativan yesterday and today, not compliant with most meds today. No SI/HI.  11/15: remains disorganized, intrusive, and paranoid. continues to receive PRNs for agitation. No SI/HI.  11/16: easily irritable, agitated, and aggressive. fixated on her son and money being stolen. No SI/HI. Risperdal and Neurontin increased.  11/17: remains disorganized, intrusive, and grossly psychotic. requires PRN meds for agitation. No SI/HI. Neurontin increased.  11/18: medication schedule changed to 6 AM, 1 PM, and 9 PM. less intrusive and more re directable today.    Plan: Will continue current medication management.

## 2021-11-18 NOTE — PROGRESS NOTE ADULT - SUBJECTIVE AND OBJECTIVE BOX
Podiatry pager #: [phone]/ [phone]    Patient is a 70y old  Female who presents with a chief complaint of EXACERBATION OF SCHIZOPHRENIA (28 Oct 2021 15:05)Podiatry consultation for painful tender thickened toenails of both feet and skin rash on the left lower extremity aggravated by Shoe gear, ambulation and palpation      HPI:      PAST MEDICAL & SURGICAL HISTORY:  Parkinson disease    Seizure disorder    Hypothyroidism    Type II diabetes mellitus    Hyperlipidemia    Schizophrenia    Hemorrhoids    No significant past surgical history        MEDICATIONS  (STANDING):  benztropine 1 milliGRAM(s) Oral two times a day  carbidopa/levodopa  25/100 1 Tablet(s) Oral three times a day  clopidogrel Tablet 75 milliGRAM(s) Oral daily  clotrimazole/betamethasone Cream 1 Application(s) Topical two times a day  diVALproex Sprinkle 750 milliGRAM(s) Oral at bedtime  gabapentin 400 milliGRAM(s) Oral <User Schedule>  latanoprost 0.005% Ophthalmic Solution 1 Drop(s) Both EYES at bedtime  levothyroxine 75 MICROGram(s) Oral <User Schedule>  lisinopril 10 milliGRAM(s) Oral daily  LORazepam     Tablet 1 milliGRAM(s) Oral <User Schedule>  metFORMIN 500 milliGRAM(s) Oral daily  metoprolol tartrate 25 milliGRAM(s) Oral two times a day  multivitamin/minerals 1 Tablet(s) Oral daily  polyethylene glycol 3350 8.5 Gram(s) Oral daily  risperiDONE   Tablet 1.5 milliGRAM(s) Oral <User Schedule>  senna 2 Tablet(s) Oral at bedtime  traZODone 50 milliGRAM(s) Oral at bedtime    MEDICATIONS  (PRN):  ammonium lactate 12% Lotion 1 Application(s) Topical two times a day PRN dry skin  hemorrhoidal Ointment 1 Application(s) Rectal every 6 hours PRN hemorrhoid discomfort  lactulose Syrup 10 Gram(s) Oral at bedtime PRN constipation  LORazepam     Tablet 1 milliGRAM(s) Oral two times a day PRN Agitation  nystatin Powder 1 Application(s) Topical two times a day PRN Fungal rash  OLANZapine Injectable 2.5 milliGRAM(s) IntraMuscular once PRN severe agitation  traZODone 25 milliGRAM(s) Oral at bedtime PRN insomnia      Allergies    penicillin (Hives; Rash)    Intolerances        VITALS:    Vital Signs Last 24 Hrs  T(C): 36.4 (19 Nov 2021 08:35), Max: 36.8 (18 Nov 2021 15:52)  T(F): 97.6 (19 Nov 2021 08:35), Max: 98.3 (18 Nov 2021 15:52)  HR: 95 (19 Nov 2021 08:35) (95 - 95)  BP: 126/95 (19 Nov 2021 08:35) (126/95 - 126/95)  BP(mean): --  RR: 20 (19 Nov 2021 08:35) (20 - 20)  SpO2: --    LABS:                          11.5   4.78  )-----------( 203      ( 18 Nov 2021 10:34 )             37.0       11-18    140  |  105  |  21  ----------------------------<  95  4.2   |  24  |  0.76    Ca    9.7      18 Nov 2021 11:04    TPro  7.8  /  Alb  3.9  /  TBili  <0.2  /  DBili  x   /  AST  21  /  ALT  11  /  AlkPhos  89  11-18      CAPILLARY BLOOD GLUCOSE              LOWER EXTREMITY PHYSICAL EXAM:    Vasular: DP/PT _1/4, B/L, CFT <2_ seconds B/L, Temperature gradient WNL_, B/L.   Neuro: Epicritic sensation Intact_ to the level of _Toes, B/L.  Musculoskeletal/Ortho: Bunion/hammertoes bilateral  Skin: Positive dystrophic, hypertrophic, brittle toenails with subungual debris 10 digits.  On the dorsal medial aspect of the left foot positive scaling/fissuring/dried bulla with a history of pruritus in the affected area.  No ulcerations or clinical signs of bacterial Cellulitis      RADIOLOGY & ADDITIONAL STUDIES:    
Podiatry pager #: [phone]/ [phone]    Patient is a 70y old  Female who presents with a chief complaint of Painful tender thickened toenails of both feet and itching and scaling of the skin aggravate it by shoe granulation palpation    HPI:      PAST MEDICAL & SURGICAL HISTORY:  Parkinson disease    Seizure disorder    Hypothyroidism    Type II diabetes mellitus    Hyperlipidemia    Schizophrenia    Hemorrhoids    No significant past surgical history        MEDICATIONS  (STANDING):  ARIPiprazole 30 milliGRAM(s) Oral daily  benztropine 1 milliGRAM(s) Oral two times a day  carbidopa/levodopa  25/100 1 Tablet(s) Oral three times a day  clopidogrel Tablet 75 milliGRAM(s) Oral daily  clotrimazole/betamethasone Cream 1 Application(s) Topical two times a day  diVALproex Sprinkle 750 milliGRAM(s) Oral at bedtime  gabapentin 100 milliGRAM(s) Oral three times a day  latanoprost 0.005% Ophthalmic Solution 1 Drop(s) Both EYES at bedtime  levothyroxine 75 MICROGram(s) Oral <User Schedule>  lisinopril 10 milliGRAM(s) Oral daily  metFORMIN 500 milliGRAM(s) Oral daily  metoprolol tartrate 25 milliGRAM(s) Oral two times a day  multivitamin/minerals 1 Tablet(s) Oral daily  polyethylene glycol 3350 8.5 Gram(s) Oral daily  senna 2 Tablet(s) Oral at bedtime  traZODone 50 milliGRAM(s) Oral at bedtime    MEDICATIONS  (PRN):  ammonium lactate 12% Lotion 1 Application(s) Topical two times a day PRN dry skin  hemorrhoidal Ointment 1 Application(s) Rectal every 6 hours PRN hemorrhoid discomfort  lactulose Syrup 10 Gram(s) Oral at bedtime PRN constipation  LORazepam     Tablet 1 milliGRAM(s) Oral every 6 hours PRN anxiety  nystatin Powder 1 Application(s) Topical two times a day PRN Fungal rash  OLANZapine Injectable 2.5 milliGRAM(s) IntraMuscular once PRN severe agitation  traZODone 25 milliGRAM(s) Oral at bedtime PRN insomnia      Allergies    penicillin (Hives; Rash)    Intolerances        VITALS:    Vital Signs Last 24 Hrs  T(C): 36.9 (29 Oct 2021 07:39), Max: 37 (28 Oct 2021 15:45)  T(F): 98.4 (29 Oct 2021 07:39), Max: 98.6 (28 Oct 2021 15:45)  HR: --  BP: --  BP(mean): --  RR: 16 (28 Oct 2021 21:00) (16 - 16)  SpO2: --    LABS:                CAPILLARY BLOOD GLUCOSE              LOWER EXTREMITY PHYSICAL EXAM:    Vasular: DP/PT 1_/4, B/L, CFT <_2 seconds B/L, Temperature gradient _WNL, B/L.   Neuro: Epicritic sensation _Diminished to the level of Toes_, B/L.  Skin: Positive dystrophic hypertrophic brittle toenails with subungual debris to all 10 digits.  On a moccasin type distribution on the plantar aspect of both feet positive scaling/fissuring/pruritus rubor On the plantar aspect of the foot.  No ulcerations or clinical signs of bacterial cellulitis      RADIOLOGY & ADDITIONAL STUDIES:

## 2021-11-18 NOTE — BH INPATIENT PSYCHIATRY PROGRESS NOTE - NSBHCHARTREVIEWVS_PSY_A_CORE FT
Vital Signs Last 24 Hrs  T(C): --  T(F): --  HR: --  BP: 141/78 (11-17-21 @ 10:03) (141/78 - 141/78)  BP(mean): --  RR: --  SpO2: --    Orthostatic VS  11-17-21 @ 20:35  Lying BP: --/-- HR: --  Sitting BP: 134/64 HR: 93  Standing BP: --/-- HR: --  Site: --  Mode: --   Vital Signs Last 24 Hrs  T(C): --  T(F): --  HR: 98 (11-18-21 @ 09:49) (98 - 98)  BP: 127/75 (11-18-21 @ 09:49) (127/75 - 127/75)  BP(mean): --  RR: --  SpO2: --    Orthostatic VS  11-17-21 @ 20:35  Lying BP: --/-- HR: --  Sitting BP: 134/64 HR: 93  Standing BP: --/-- HR: --  Site: --  Mode: --

## 2021-11-18 NOTE — BH INPATIENT PSYCHIATRY PROGRESS NOTE - NSBHFUPINTERVALHXFT_PSY_A_CORE
Patient seen for follow-up for psychosis. Chart reviewed and case discussed with nursing staff. Per staff, pt is less intrusive and redirectable after medication timing was changed. On exam, patient is observed in her room. She is well groomed, pleasant, and cooperative. Remains psychotic, but is less fixated on delusions. Speech is less disorganized, but remains dysarthric due to denture. Denies any SI, HI. She is medication compliant and denies side effects. Appetite and sleep are adequate. No pain, headache, dizziness, or constipation reported.    Medication schedule was changed to 6 AM, 1 PM, and 9 PM.

## 2021-11-18 NOTE — BH INPATIENT PSYCHIATRY PROGRESS NOTE - CURRENT MEDICATION
MEDICATIONS  (STANDING):  benztropine 1 milliGRAM(s) Oral two times a day  carbidopa/levodopa  25/100 1 Tablet(s) Oral three times a day  clopidogrel Tablet 75 milliGRAM(s) Oral daily  clotrimazole/betamethasone Cream 1 Application(s) Topical two times a day  diVALproex Sprinkle 750 milliGRAM(s) Oral at bedtime  gabapentin 400 milliGRAM(s) Oral <User Schedule>  latanoprost 0.005% Ophthalmic Solution 1 Drop(s) Both EYES at bedtime  levothyroxine 75 MICROGram(s) Oral <User Schedule>  lisinopril 10 milliGRAM(s) Oral daily  LORazepam     Tablet 1 milliGRAM(s) Oral <User Schedule>  metFORMIN 500 milliGRAM(s) Oral daily  metoprolol tartrate 25 milliGRAM(s) Oral two times a day  multivitamin/minerals 1 Tablet(s) Oral daily  polyethylene glycol 3350 8.5 Gram(s) Oral daily  risperiDONE   Tablet 1 milliGRAM(s) Oral <User Schedule>  senna 2 Tablet(s) Oral at bedtime  traZODone 50 milliGRAM(s) Oral at bedtime    MEDICATIONS  (PRN):  ammonium lactate 12% Lotion 1 Application(s) Topical two times a day PRN dry skin  hemorrhoidal Ointment 1 Application(s) Rectal every 6 hours PRN hemorrhoid discomfort  lactulose Syrup 10 Gram(s) Oral at bedtime PRN constipation  LORazepam     Tablet 1 milliGRAM(s) Oral two times a day PRN Agitation  nystatin Powder 1 Application(s) Topical two times a day PRN Fungal rash  OLANZapine Injectable 2.5 milliGRAM(s) IntraMuscular once PRN severe agitation  traZODone 25 milliGRAM(s) Oral at bedtime PRN insomnia

## 2021-11-18 NOTE — PROGRESS NOTE ADULT - ASSESSMENT
Assessment/plan:    Onychomycosis secondary to dermatophytes  Tinea pedis bilateral  Diabetes mellitus    Aseptic debridement of mycotic nails to all 10 digits with Betadine applied  Recommend miconazole cream to the plantar aspect of both feet daily  Recommend continued routine podiatric foot care as an outpatient  Thank you for consultation
Assessment/plan:    Onychomycosis secondary to dermatophytes  Tinea pedis left foot  Diabetes mellitus    Aseptic debridement of mycotic nails 10 with Betadine applied  Patient given Rx for topical antifungal for her left foot to apply daily  Recommend continued routine podiatric diabetic foot care as an outpatient  Thank you for consultation

## 2021-11-18 NOTE — BH INPATIENT PSYCHIATRY PROGRESS NOTE - NSBHMETABOLIC_PSY_ALL_CORE_FT
BMI: BMI (kg/m2): 25.2 (11-06-21 @ 15:58)  HbA1c: A1C with Estimated Average Glucose Result: 5.6 % (10-15-21 @ 11:00)    Glucose: POCT Blood Glucose.: 61 mg/dL (10-19-21 @ 11:59)    BP: 141/78 (11-17-21 @ 10:03) (141/78 - 146/83)  Lipid Panel: Date/Time: 10-14-21 @ 09:38  Cholesterol, Serum: 159  Direct LDL: --  HDL Cholesterol, Serum: 66  Total Cholesterol/HDL Ration Measurement: --  Triglycerides, Serum: 100   BMI: BMI (kg/m2): 25.2 (11-06-21 @ 15:58)  HbA1c: A1C with Estimated Average Glucose Result: 5.6 % (10-15-21 @ 11:00)    Glucose: POCT Blood Glucose.: 61 mg/dL (10-19-21 @ 11:59)    BP: 127/75 (11-18-21 @ 09:49) (127/75 - 146/83)  Lipid Panel: Date/Time: 10-14-21 @ 09:38  Cholesterol, Serum: 159  Direct LDL: --  HDL Cholesterol, Serum: 66  Total Cholesterol/HDL Ration Measurement: --  Triglycerides, Serum: 100

## 2021-11-19 PROCEDURE — 99232 SBSQ HOSP IP/OBS MODERATE 35: CPT

## 2021-11-19 RX ORDER — CLOTRIMAZOLE AND BETAMETHASONE DIPROPIONATE 10; .5 MG/G; MG/G
1 CREAM TOPICAL DAILY
Refills: 0 | Status: DISCONTINUED | OUTPATIENT
Start: 2021-11-19 | End: 2021-11-20

## 2021-11-19 RX ORDER — RISPERIDONE 4 MG/1
1.5 TABLET ORAL
Refills: 0 | Status: DISCONTINUED | OUTPATIENT
Start: 2021-11-19 | End: 2021-11-20

## 2021-11-19 RX ADMIN — Medication 1 MILLIGRAM(S): at 20:28

## 2021-11-19 RX ADMIN — RISPERIDONE 1.5 MILLIGRAM(S): 4 TABLET ORAL at 20:29

## 2021-11-19 RX ADMIN — METFORMIN HYDROCHLORIDE 500 MILLIGRAM(S): 850 TABLET ORAL at 09:33

## 2021-11-19 RX ADMIN — Medication 1 TABLET(S): at 09:33

## 2021-11-19 RX ADMIN — CLOTRIMAZOLE AND BETAMETHASONE DIPROPIONATE 1 APPLICATION(S): 10; .5 CREAM TOPICAL at 20:30

## 2021-11-19 RX ADMIN — CARBIDOPA AND LEVODOPA 1 TABLET(S): 25; 100 TABLET ORAL at 12:38

## 2021-11-19 RX ADMIN — CLOTRIMAZOLE AND BETAMETHASONE DIPROPIONATE 1 APPLICATION(S): 10; .5 CREAM TOPICAL at 11:51

## 2021-11-19 RX ADMIN — GABAPENTIN 400 MILLIGRAM(S): 400 CAPSULE ORAL at 12:38

## 2021-11-19 RX ADMIN — Medication 1 MILLIGRAM(S): at 09:33

## 2021-11-19 RX ADMIN — Medication 75 MICROGRAM(S): at 06:46

## 2021-11-19 RX ADMIN — RISPERIDONE 1 MILLIGRAM(S): 4 TABLET ORAL at 06:46

## 2021-11-19 RX ADMIN — Medication 25 MILLIGRAM(S): at 20:28

## 2021-11-19 RX ADMIN — Medication 50 MILLIGRAM(S): at 20:29

## 2021-11-19 RX ADMIN — Medication 1 MILLIGRAM(S): at 12:37

## 2021-11-19 RX ADMIN — GABAPENTIN 400 MILLIGRAM(S): 400 CAPSULE ORAL at 06:46

## 2021-11-19 RX ADMIN — Medication 1 MILLIGRAM(S): at 06:46

## 2021-11-19 RX ADMIN — GABAPENTIN 400 MILLIGRAM(S): 400 CAPSULE ORAL at 20:29

## 2021-11-19 RX ADMIN — LATANOPROST 1 DROP(S): 0.05 SOLUTION/ DROPS OPHTHALMIC; TOPICAL at 20:27

## 2021-11-19 RX ADMIN — DIVALPROEX SODIUM 750 MILLIGRAM(S): 500 TABLET, DELAYED RELEASE ORAL at 20:28

## 2021-11-19 RX ADMIN — CLOTRIMAZOLE AND BETAMETHASONE DIPROPIONATE 1 APPLICATION(S): 10; .5 CREAM TOPICAL at 20:29

## 2021-11-19 RX ADMIN — CARBIDOPA AND LEVODOPA 1 TABLET(S): 25; 100 TABLET ORAL at 09:32

## 2021-11-19 RX ADMIN — POLYETHYLENE GLYCOL 3350 8.5 GRAM(S): 17 POWDER, FOR SOLUTION ORAL at 09:35

## 2021-11-19 RX ADMIN — Medication 25 MILLIGRAM(S): at 09:32

## 2021-11-19 RX ADMIN — SENNA PLUS 2 TABLET(S): 8.6 TABLET ORAL at 20:28

## 2021-11-19 RX ADMIN — CARBIDOPA AND LEVODOPA 1 TABLET(S): 25; 100 TABLET ORAL at 20:28

## 2021-11-19 RX ADMIN — LISINOPRIL 10 MILLIGRAM(S): 2.5 TABLET ORAL at 09:33

## 2021-11-19 RX ADMIN — CLOPIDOGREL BISULFATE 75 MILLIGRAM(S): 75 TABLET, FILM COATED ORAL at 09:33
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
## 2021-11-19 NOTE — BH INPATIENT PSYCHIATRY PROGRESS NOTE - NSBHANTIPSYCHOTIC_PSY_ALL_CORE
Yes...

## 2021-11-19 NOTE — BH INPATIENT PSYCHIATRY PROGRESS NOTE - NSBHCHARTREVIEWLAB_PSY_A_CORE FT
HBA1C  5.6, GLUCOSE 114 ON 10/15/21

## 2021-11-19 NOTE — BH INPATIENT PSYCHIATRY PROGRESS NOTE - CURRENT MEDICATION
MEDICATIONS  (STANDING):  benztropine 1 milliGRAM(s) Oral two times a day  carbidopa/levodopa  25/100 1 Tablet(s) Oral three times a day  clopidogrel Tablet 75 milliGRAM(s) Oral daily  clotrimazole/betamethasone Cream 1 Application(s) Topical two times a day  diVALproex Sprinkle 750 milliGRAM(s) Oral at bedtime  gabapentin 400 milliGRAM(s) Oral <User Schedule>  latanoprost 0.005% Ophthalmic Solution 1 Drop(s) Both EYES at bedtime  levothyroxine 75 MICROGram(s) Oral <User Schedule>  lisinopril 10 milliGRAM(s) Oral daily  LORazepam     Tablet 1 milliGRAM(s) Oral <User Schedule>  metFORMIN 500 milliGRAM(s) Oral daily  metoprolol tartrate 25 milliGRAM(s) Oral two times a day  multivitamin/minerals 1 Tablet(s) Oral daily  polyethylene glycol 3350 8.5 Gram(s) Oral daily  risperiDONE   Tablet 1.5 milliGRAM(s) Oral <User Schedule>  senna 2 Tablet(s) Oral at bedtime  traZODone 50 milliGRAM(s) Oral at bedtime    MEDICATIONS  (PRN):  ammonium lactate 12% Lotion 1 Application(s) Topical two times a day PRN dry skin  hemorrhoidal Ointment 1 Application(s) Rectal every 6 hours PRN hemorrhoid discomfort  lactulose Syrup 10 Gram(s) Oral at bedtime PRN constipation  LORazepam     Tablet 1 milliGRAM(s) Oral two times a day PRN Agitation  nystatin Powder 1 Application(s) Topical two times a day PRN Fungal rash  OLANZapine Injectable 2.5 milliGRAM(s) IntraMuscular once PRN severe agitation  traZODone 25 milliGRAM(s) Oral at bedtime PRN insomnia
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
## 2021-11-19 NOTE — BH INPATIENT PSYCHIATRY PROGRESS NOTE - NSICDXBHSECONDARYDX_PSY_ALL_CORE
POP-Q stage 2 cystocele   N81.10  Parkinsons   G20  
Parkinsons   G20  Glaucoma   H40.9  Hypothyroidism   E03.9  HTN (hypertension)   I10  DM (diabetes mellitus)   E11.9  
POP-Q stage 2 cystocele   N81.10  Parkinsons   G20  
Parkinsons   G20  Glaucoma   H40.9  Hypothyroidism   E03.9  HTN (hypertension)   I10  DM (diabetes mellitus)   E11.9  
POP-Q stage 2 cystocele   N81.10  Parkinsons   G20  
POP-Q stage 2 cystocele   N81.10  Parkinsons   G20  
Parkinsons   G20  Glaucoma   H40.9  Hypothyroidism   E03.9  HTN (hypertension)   I10  DM (diabetes mellitus)   E11.9  
POP-Q stage 2 cystocele   N81.10  Parkinsons   G20  
Parkinsons   G20  Glaucoma   H40.9  Hypothyroidism   E03.9  HTN (hypertension)   I10  DM (diabetes mellitus)   E11.9  

## 2021-11-19 NOTE — BH INPATIENT PSYCHIATRY PROGRESS NOTE - NSBHMETABOLIC_PSY_ALL_CORE_FT
BMI: BMI (kg/m2): 25.2 (11-06-21 @ 15:58)  HbA1c: A1C with Estimated Average Glucose Result: 5.6 % (10-15-21 @ 11:00)    Glucose: POCT Blood Glucose.: 61 mg/dL (10-19-21 @ 11:59)    BP: 126/95 (11-19-21 @ 08:35) (126/95 - 146/83)  Lipid Panel: Date/Time: 10-14-21 @ 09:38  Cholesterol, Serum: 159  Direct LDL: --  HDL Cholesterol, Serum: 66  Total Cholesterol/HDL Ration Measurement: --  Triglycerides, Serum: 100

## 2021-11-19 NOTE — BH INPATIENT PSYCHIATRY PROGRESS NOTE - NSBHCHARTREVIEWVS_PSY_A_CORE FT
Vital Signs Last 24 Hrs  T(C): 36.4 (11-19-21 @ 08:35), Max: 36.8 (11-18-21 @ 15:52)  T(F): 97.6 (11-19-21 @ 08:35), Max: 98.3 (11-18-21 @ 15:52)  HR: 95 (11-19-21 @ 08:35) (95 - 95)  BP: 126/95 (11-19-21 @ 08:35) (126/95 - 126/95)  BP(mean): --  RR: 20 (11-19-21 @ 08:35) (20 - 20)  SpO2: --    Orthostatic VS  11-18-21 @ 20:18  Lying BP: --/-- HR: --  Sitting BP: 110/88 HR: 112  Standing BP: --/-- HR: --  Site: --  Mode: --  Orthostatic VS  11-17-21 @ 20:35  Lying BP: --/-- HR: --  Sitting BP: 134/64 HR: 93  Standing BP: --/-- HR: --  Site: --  Mode: --

## 2021-11-20 ENCOUNTER — INPATIENT (INPATIENT)
Facility: HOSPITAL | Age: 71
LOS: 16 days | Discharge: PSYCHIATRIC OR SUBSTANCE USE FACILITY | End: 2021-12-07
Attending: HOSPITALIST | Admitting: HOSPITALIST
Payer: MEDICARE

## 2021-11-20 VITALS
DIASTOLIC BLOOD PRESSURE: 89 MMHG | TEMPERATURE: 98 F | RESPIRATION RATE: 18 BRPM | HEIGHT: 62 IN | SYSTOLIC BLOOD PRESSURE: 119 MMHG | OXYGEN SATURATION: 99 % | HEART RATE: 69 BPM

## 2021-11-20 VITALS — SYSTOLIC BLOOD PRESSURE: 105 MMHG | HEART RATE: 60 BPM | DIASTOLIC BLOOD PRESSURE: 52 MMHG

## 2021-11-20 PROCEDURE — 99285 EMERGENCY DEPT VISIT HI MDM: CPT

## 2021-11-20 PROCEDURE — 99231 SBSQ HOSP IP/OBS SF/LOW 25: CPT

## 2021-11-20 PROCEDURE — 70450 CT HEAD/BRAIN W/O DYE: CPT | Mod: 26,MA

## 2021-11-20 RX ORDER — MIDAZOLAM HYDROCHLORIDE 1 MG/ML
5 INJECTION, SOLUTION INTRAMUSCULAR; INTRAVENOUS ONCE
Refills: 0 | Status: DISCONTINUED | OUTPATIENT
Start: 2021-11-20 | End: 2021-11-20

## 2021-11-20 RX ADMIN — LATANOPROST 1 DROP(S): 0.05 SOLUTION/ DROPS OPHTHALMIC; TOPICAL at 20:02

## 2021-11-20 RX ADMIN — SENNA PLUS 2 TABLET(S): 8.6 TABLET ORAL at 20:02

## 2021-11-20 RX ADMIN — Medication 1 MILLIGRAM(S): at 20:03

## 2021-11-20 RX ADMIN — CARBIDOPA AND LEVODOPA 1 TABLET(S): 25; 100 TABLET ORAL at 13:33

## 2021-11-20 RX ADMIN — CLOTRIMAZOLE AND BETAMETHASONE DIPROPIONATE 1 APPLICATION(S): 10; .5 CREAM TOPICAL at 09:47

## 2021-11-20 RX ADMIN — CLOTRIMAZOLE AND BETAMETHASONE DIPROPIONATE 1 APPLICATION(S): 10; .5 CREAM TOPICAL at 20:04

## 2021-11-20 RX ADMIN — Medication 50 MILLIGRAM(S): at 20:03

## 2021-11-20 RX ADMIN — GABAPENTIN 400 MILLIGRAM(S): 400 CAPSULE ORAL at 05:22

## 2021-11-20 RX ADMIN — RISPERIDONE 1.5 MILLIGRAM(S): 4 TABLET ORAL at 20:03

## 2021-11-20 RX ADMIN — Medication 25 MILLIGRAM(S): at 20:03

## 2021-11-20 RX ADMIN — DIVALPROEX SODIUM 750 MILLIGRAM(S): 500 TABLET, DELAYED RELEASE ORAL at 20:02

## 2021-11-20 RX ADMIN — MIDAZOLAM HYDROCHLORIDE 5 MILLIGRAM(S): 1 INJECTION, SOLUTION INTRAMUSCULAR; INTRAVENOUS at 23:45

## 2021-11-20 RX ADMIN — LISINOPRIL 10 MILLIGRAM(S): 2.5 TABLET ORAL at 09:19

## 2021-11-20 RX ADMIN — RISPERIDONE 1.5 MILLIGRAM(S): 4 TABLET ORAL at 05:23

## 2021-11-20 RX ADMIN — Medication 1 MILLIGRAM(S): at 05:23

## 2021-11-20 RX ADMIN — Medication 1 TABLET(S): at 09:19

## 2021-11-20 RX ADMIN — Medication 1 MILLIGRAM(S): at 10:44

## 2021-11-20 RX ADMIN — CLOPIDOGREL BISULFATE 75 MILLIGRAM(S): 75 TABLET, FILM COATED ORAL at 09:19

## 2021-11-20 RX ADMIN — METFORMIN HYDROCHLORIDE 500 MILLIGRAM(S): 850 TABLET ORAL at 09:19

## 2021-11-20 RX ADMIN — CARBIDOPA AND LEVODOPA 1 TABLET(S): 25; 100 TABLET ORAL at 20:02

## 2021-11-20 RX ADMIN — CARBIDOPA AND LEVODOPA 1 TABLET(S): 25; 100 TABLET ORAL at 09:19

## 2021-11-20 RX ADMIN — Medication 75 MICROGRAM(S): at 05:23

## 2021-11-20 RX ADMIN — GABAPENTIN 400 MILLIGRAM(S): 400 CAPSULE ORAL at 20:03

## 2021-11-20 RX ADMIN — GABAPENTIN 400 MILLIGRAM(S): 400 CAPSULE ORAL at 13:33

## 2021-11-20 RX ADMIN — Medication 25 MILLIGRAM(S): at 09:19

## 2021-11-20 RX ADMIN — Medication 1 MILLIGRAM(S): at 13:33

## 2021-11-20 RX ADMIN — Medication 1 MILLIGRAM(S): at 09:19

## 2021-11-20 NOTE — BH INPATIENT PSYCHIATRY PROGRESS NOTE - NSCGIIMPROVESX_PSY_ALL_CORE
3 = Minimally improved - slightly better with little or no clinically meaningful reduction of symptoms.  Represents very little change in basic clinical status, level of care, or functional capacity.
4 = No change - symptoms remain essentially unchanged
3 = Minimally improved - slightly better with little or no clinically meaningful reduction of symptoms.  Represents very little change in basic clinical status, level of care, or functional capacity.
3 = Minimally improved - slightly better with little or no clinically meaningful reduction of symptoms.  Represents very little change in basic clinical status, level of care, or functional capacity.
4 = No change - symptoms remain essentially unchanged
3 = Minimally improved - slightly better with little or no clinically meaningful reduction of symptoms.  Represents very little change in basic clinical status, level of care, or functional capacity.
4 = No change - symptoms remain essentially unchanged
3 = Minimally improved - slightly better with little or no clinically meaningful reduction of symptoms.  Represents very little change in basic clinical status, level of care, or functional capacity.
4 = No change - symptoms remain essentially unchanged
3 = Minimally improved - slightly better with little or no clinically meaningful reduction of symptoms.  Represents very little change in basic clinical status, level of care, or functional capacity.
3 = Minimally improved - slightly better with little or no clinically meaningful reduction of symptoms.  Represents very little change in basic clinical status, level of care, or functional capacity.
4 = No change - symptoms remain essentially unchanged
3 = Minimally improved - slightly better with little or no clinically meaningful reduction of symptoms.  Represents very little change in basic clinical status, level of care, or functional capacity.
4 = No change - symptoms remain essentially unchanged
3 = Minimally improved - slightly better with little or no clinically meaningful reduction of symptoms.  Represents very little change in basic clinical status, level of care, or functional capacity.
3 = Minimally improved - slightly better with little or no clinically meaningful reduction of symptoms.  Represents very little change in basic clinical status, level of care, or functional capacity.

## 2021-11-20 NOTE — BH INPATIENT PSYCHIATRY PROGRESS NOTE - NSTXDISORGDATETRGT_PSY_ALL_CORE
10-Nov-2021
03-Nov-2021
18-Nov-2021
24-Nov-2021
21-Oct-2021
08-Nov-2021
27-Oct-2021
03-Nov-2021
21-Oct-2021
03-Nov-2021
22-Nov-2021
24-Nov-2021
21-Oct-2021
08-Nov-2021
22-Nov-2021
10-Nov-2021
22-Nov-2021
27-Oct-2021
01-Dec-2021
22-Nov-2021
21-Oct-2021
03-Nov-2021
27-Oct-2021
08-Nov-2021
27-Oct-2021
22-Nov-2021
27-Oct-2021
10-Nov-2021
24-Nov-2021
18-Nov-2021
21-Oct-2021
22-Nov-2021
27-Oct-2021
18-Nov-2021
08-Nov-2021
18-Nov-2021
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
## 2021-11-20 NOTE — BH INPATIENT PSYCHIATRY PROGRESS NOTE - NSCGISEVERILLNESS_PSY_ALL_CORE
5 = Markedly ill - intrusive symptoms that distinctly impair social/occupational function or cause intrusive levels of distress
7 = Among the most extremely ill patients – pathology drastically interferes in many life functions; may be hospitalized

## 2021-11-20 NOTE — BH INPATIENT PSYCHIATRY PROGRESS NOTE - NSTXPSYCHOPROGRES_PSY_ALL_CORE
No Change
Improving
No Change
Improving
No Change
Improving
No Change
Improving
No Change
Improving
No Change
Improving
Improving
No Change
Improving
Improving
No Change
Improving
No Change
Improving
No Change
Improving
No Change
No Change
Improving
Improving
No Change
No Change

## 2021-11-20 NOTE — ED PROVIDER NOTE - PHYSICAL EXAMINATION
General: lethargic, responsive to painful stimuli and voice  HEENT: R pupil dilated, L pupil constricted   Cardiovascular: Normal s1, s2, RRR  Respiratory: CTA b/l   Abdominal: Soft, ntnd  Extremities: No swelling in LEs  Neurologic: Non focal  Psych: Awake, alert answering questions appropriately

## 2021-11-20 NOTE — BH INPATIENT PSYCHIATRY PROGRESS NOTE - NSTXDCOTHRDATETRGT_PSY_ALL_CORE
22-Nov-2021
08-Nov-2021
08-Nov-2021
22-Nov-2021
29-Oct-2021
10-Nov-2021
08-Nov-2021
08-Nov-2021
22-Nov-2021
08-Nov-2021
10-Nov-2021
29-Oct-2021
22-Nov-2021
21-Oct-2021
21-Oct-2021
22-Nov-2021
08-Nov-2021
22-Nov-2021
08-Nov-2021
22-Nov-2021
29-Oct-2021
22-Nov-2021
08-Nov-2021
22-Nov-2021
21-Oct-2021
22-Nov-2021
22-Nov-2021
21-Oct-2021
21-Oct-2021
22-Nov-2021
08-Nov-2021
21-Oct-2021

## 2021-11-20 NOTE — ED PROVIDER NOTE - OBJECTIVE STATEMENT
71 yo F SHANE from [facility] for acute change in mental status. States patient is normally AAOx3 and was found at 10:15 pm to be lethargic. Patient LKN 9:15. Brought into ambay, responsive to painful stimuli moving extremities, however, asymmetric pupils were noted and Code stroke was called.

## 2021-11-20 NOTE — BH INPATIENT PSYCHIATRY PROGRESS NOTE - NSTXDCOTHRDATEEST_PSY_ALL_CORE
22-Oct-2021
03-Nov-2021
22-Oct-2021
03-Nov-2021
22-Oct-2021
03-Nov-2021
22-Oct-2021
03-Nov-2021
22-Oct-2021
22-Oct-2021
14-Oct-2021
22-Oct-2021
03-Nov-2021
22-Oct-2021
14-Oct-2021
14-Oct-2021
03-Nov-2021
22-Oct-2021
03-Nov-2021
03-Nov-2021
22-Oct-2021
22-Oct-2021
03-Nov-2021
22-Oct-2021
03-Nov-2021
03-Nov-2021
14-Oct-2021
03-Nov-2021
14-Oct-2021

## 2021-11-20 NOTE — [facility] INPATIENT PSYCHIATRY PROGRESS NOTE - NSBHCONSBHPROVDETAILS_PSY_A_CORE  FT
Spoke with NP Elizabeth [phone] ext [phone]  Elizabeth reported that the Prolixin was stopped in May and just restarted about 1 week ago due to worsening psychosis. Patient stopped eating and lost about 18lbs in one month. Patient was previously stabilized on Prolixin 5mg BID. Patient at baseline is calm, cooperative, pleasant and not aggressive.  
Spoke with NP Elizabeth [phone] ext [phone]  Elizabeth reported that the Prolixin was stopped in May and just restarted about 1 week ago due to worsening psychosis. Patient stopped eating and lost about 18lbs in one month. Patient was previously stabilized on Prolixin 5mg BID. Patient at baseline is calm, cooperative, pleasant and not aggressive.  
Spoke with NP Elizabeth [phone] ext 370  Elizabeth reported that the Prolixin was stopped in May and just restarted about 1 week ago due to worsening psychosis. Patient stopped eating and lost about 18lbs in one month. Patient was previously stabilized on Prolixin 5mg BID. Patient at baseline is calm, cooperative, pleasant and not aggressive.  
Spoke with NP Elizabeth [phone] ext 370  Elizabeth reported that the Prolixin was stopped in May and just restarted about 1 week ago due to worsening psychosis. Patient stopped eating and lost about 18lbs in one month. Patient was previously stabilized on Prolixin 5mg BID. Patient at baseline is calm, cooperative, pleasant and not aggressive.  
Spoke with NP Elizabeth [phone] ext 370  Elizabeth reported that the Prolixin was stopped in May and just restarted about 1 week ago due to worsening psychosis. Patient stopped eating and lost about 18lbs in one month. Patient was previously stabilized on Prolixin 5mg BID. Patient at baseline is calm, cooperative, pleasant and not aggressive.  
Spoke with NP Elizabeth [phone] ext 370  Elizabeth reported that the Prolixin was stopped in May and just restarted about 1 week ago due to worsening psychosis. Patient stopped eating and lost about 18lbs in one month. Patient was previously stabilized on Prolixin 5mg BID. Patient at baseline is calm, cooperative, pleasant and not aggressive.  
Spoke with NP Elizabeth [phone] ext 370  Elizabeth reported that the Prolixin was stopped in May and just restarted about 1 week ago due to worsening psychosis. Patient stopped eating and lost about 18lbs in one month. Patient was previously stabilized on Prolixin 5mg BID. Patient at baseline is calm, cooperative, pleasant and not aggressive.  
Spoke with NP Elizabeth [phone] ext 370  Elizabeth reported that the Prolixin was stopped in May and just restarted about 1 week ago due to worsening psychosis. Patient stopped eating and lost about 18lbs in one month. Patient was previously stabilized on Prolixin 5mg BID. Patient at baseline is calm, cooperative, pleasant and not aggressive.  
Spoke with NP Elizabeth [phone] ext 370  Elizabeth reported that the Prolixin was stopped in May and just restarted about 1 week ago due to worsening psychosis. Patient stopped eating and lost about 18lbs in one month. Patient was previously stabilized on Prolixin 5mg BID. Patient at baseline is calm, cooperative, pleasant and not aggressive.  
Spoke with NP Elizabeth [phone] ext 370  Elizabeth reported that the Prolixin was stopped in May and just restarted about 1 week ago due to worsening psychosis. Patient stopped eating and lost about 18lbs in one month. Patient was previously stabilized on Prolixin 5mg BID. Patient at baseline is calm, cooperative, pleasant and not aggressive.  
Spoke with NP Elizabeth [phone] ext 370  Elizabeth reported that the Prolixin was stopped in May and just restarted about 1 week ago due to worsening psychosis. Patient stopped eating and lost about 18lbs in one month. Patient was previously stabilized on Prolixin 5mg BID. Patient at baseline is calm, cooperative, pleasant and not aggressive.  
Spoke with NP Elizabeth [phone] ext 370  Elizabeth reported that the Prolixin was stopped in May and just restarted about 1 week ago due to worsening psychosis. Patient stopped eating and lost about 18lbs in one month. Patient was previously stabilized on Prolixin 5mg BID. Patient at baseline is calm, cooperative, pleasant and not aggressive.  
Spoke with NP Elizabeth [phone] ext 370  Elizabeth reported that the Prolixin was stopped in May and just restarted about 1 week ago due to worsening psychosis. Patient stopped eating and lost about 18lbs in one month. Patient was previously stabilized on Prolixin 5mg BID. Patient at baseline is calm, cooperative, pleasant and not aggressive.  
Spoke with NP Elizabeth [phone] ext 370  Elizabeth reported that the Prolixin was stopped in May and just restarted about 1 week ago due to worsening psychosis. Patient stopped eating and lost about 18lbs in one month. Patient was previously stabilized on Prolixin 5mg BID. Patient at baseline is calm, cooperative, pleasant and not aggressive.  
Spoke with NP Elizabeth [phone] ext 370  Elizabeth reported that the Prolixin was stopped in May and just restarted about 1 week ago due to worsening psychosis. Patient stopped eating and lost about 18lbs in one month. Patient was previously stabilized on Prolixin 5mg BID. Patient at baseline is calm, cooperative, pleasant and not aggressive.  
Spoke with NP Elizabeth [phone] ext 370  Elizabeth reported that the Prolixin was stopped in May and just restarted about 1 week ago due to worsening psychosis. Patient stopped eating and lost about 18lbs in one month. Patient was previously stabilized on Prolixin 5mg BID. Patient at baseline is calm, cooperative, pleasant and not aggressive.  
Spoke with NP Elizabeth [phone] ext 370  Elizabeth reported that the Prolixin was stopped in May and just restarted about 1 week ago due to worsening psychosis. Patient stopped eating and lost about 18lbs in one month. Patient was previously stabilized on Prolixin 5mg BID. Patient at baseline is calm, cooperative, pleasant and not aggressive.  
Spoke with NP Elizabeth [phone] ext 370  Elizabeth reported that the Prolixin was stopped in May and just restarted about 1 week ago due to worsening psychosis. Patient stopped eating and lost about 18lbs in one month. Patient was previously stabilized on Prolixin 5mg BID. Patient at baseline is calm, cooperative, pleasant and not aggressive.  
Spoke with NP Elizabeth [phone] ext 370  Elizabeth reported that the Prolixin was stopped in May and just restarted about 1 week ago due to worsening psychosis. Patient stopped eating and lost about 18lbs in one month. Patient was previously stabilized on Prolixin 5mg BID. Patient at baseline is calm, cooperative, pleasant and not aggressive.  
Spoke with NP Elizabeth [phone] ext 370  Elizabeth reported that the Prolixin was stopped in May and just restarted about 1 week ago due to worsening psychosis. Patient stopped eating and lost about 18lbs in one month. Patient was previously stabilized on Prolixin 5mg BID. Patient at baseline is calm, cooperative, pleasant and not aggressive.  
Spoke with NP Elizabeth [phone] ext 370  Elizabeth reported that the Prolixin was stopped in May and just restarted about 1 week ago due to worsening psychosis. Patient stopped eating and lost about 18lbs in one month. Patient was previously stabilized on Prolixin 5mg BID. Patient at baseline is calm, cooperative, pleasant and not aggressive.  
Spoke with NP Elizabeth [phone] ext 370  Elizabeth reported that the Prolixin was stopped in May and just restarted about 1 week ago due to worsening psychosis. Patient stopped eating and lost about 18lbs in one month. Patient was previously stabilized on Prolixin 5mg BID. Patient at baseline is calm, cooperative, pleasant and not aggressive.  
Spoke with NP Elizabeth [phone] ext 370  Elizabeth reported that the Prolixin was stopped in May and just restarted about 1 week ago due to worsening psychosis. Patient stopped eating and lost about 18lbs in one month. Patient was previously stabilized on Prolixin 5mg BID. Patient at baseline is calm, cooperative, pleasant and not aggressive.  
Spoke with NP Elizabeth [phone] ext 370  Elizabeth reported that the Prolixin was stopped in May and just restarted about 1 week ago due to worsening psychosis. Patient stopped eating and lost about 18lbs in one month. Patient was previously stabilized on Prolixin 5mg BID. Patient at baseline is calm, cooperative, pleasant and not aggressive.  
Spoke with NP Elizabeth [phone] ext 370  Elizabeth reported that the Prolixin was stopped in May and just restarted about 1 week ago due to worsening psychosis. Patient stopped eating and lost about 18lbs in one month. Patient was previously stabilized on Prolixin 5mg BID. Patient at baseline is calm, cooperative, pleasant and not aggressive.  
Spoke with NP Elizabeth [phone] ext 370  Elizabeth reported that the Prolixin was stopped in May and just restarted about 1 week ago due to worsening psychosis. Patient stopped eating and lost about 18lbs in one month. Patient was previously stabilized on Prolixin 5mg BID. Patient at baseline is calm, cooperative, pleasant and not aggressive.  
Spoke with NP Elizabeth [phone] ext 370  Elizabeth reported that the Prolixin was stopped in May and just restarted about 1 week ago due to worsening psychosis. Patient stopped eating and lost about 18lbs in one month. Patient was previously stabilized on Prolixin 5mg BID. Patient at baseline is calm, cooperative, pleasant and not aggressive.  
Spoke with NP Elizabeth [phone] ext 370  Elizabeth reported that the Prolixin was stopped in May and just restarted about 1 week ago due to worsening psychosis. Patient stopped eating and lost about 18lbs in one month. Patient was previously stabilized on Prolixin 5mg BID. Patient at baseline is calm, cooperative, pleasant and not aggressive.  
Spoke with NP Elizabeth [phone] ext 370  Elizabeth reported that the Prolixin was stopped in May and just restarted about 1 week ago due to worsening psychosis. Patient stopped eating and lost about 18lbs in one month. Patient was previously stabilized on Prolixin 5mg BID. Patient at baseline is calm, cooperative, pleasant and not aggressive.  
Spoke with NP Elizabeth [phone] ext 370  Elizabeth reported that the Prolixin was stopped in May and just restarted about 1 week ago due to worsening psychosis. Patient stopped eating and lost about 18lbs in one month. Patient was previously stabilized on Prolixin 5mg BID. Patient at baseline is calm, cooperative, pleasant and not aggressive.  
Spoke with NP Elizabeth [phone] ext 370  Elizabeth reported that the Prolixin was stopped in May and just restarted about 1 week ago due to worsening psychosis. Patient stopped eating and lost about 18lbs in one month. Patient was previously stabilized on Prolixin 5mg BID. Patient at baseline is calm, cooperative, pleasant and not aggressive.  
Spoke with NP Elizabeth [phone] ext 370  Elizabeth reported that the Prolixin was stopped in May and just restarted about 1 week ago due to worsening psychosis. Patient stopped eating and lost about 18lbs in one month. Patient was previously stabilized on Prolixin 5mg BID. Patient at baseline is calm, cooperative, pleasant and not aggressive.  
Spoke with NP Elizabeth [phone] ext 370  Elizabeth reported that the Prolixin was stopped in May and just restarted about 1 week ago due to worsening psychosis. Patient stopped eating and lost about 18lbs in one month. Patient was previously stabilized on Prolixin 5mg BID. Patient at baseline is calm, cooperative, pleasant and not aggressive.  
Spoke with NP Elizabeth [phone] ext 370  Elizabeth reported that the Prolixin was stopped in May and just restarted about 1 week ago due to worsening psychosis. Patient stopped eating and lost about 18lbs in one month. Patient was previously stabilized on Prolixin 5mg BID. Patient at baseline is calm, cooperative, pleasant and not aggressive.  
Spoke with NP Elizabeth [phone] ext 370  Elizabeth reported that the Prolixin was stopped in May and just restarted about 1 week ago due to worsening psychosis. Patient stopped eating and lost about 18lbs in one month. Patient was previously stabilized on Prolixin 5mg BID. Patient at baseline is calm, cooperative, pleasant and not aggressive.  
Spoke with NP Elizabeth [phone] ext 370  Elizabeth reported that the Prolixin was stopped in May and just restarted about 1 week ago due to worsening psychosis. Patient stopped eating and lost about 18lbs in one month. Patient was previously stabilized on Prolixin 5mg BID. Patient at baseline is calm, cooperative, pleasant and not aggressive.

## 2021-11-20 NOTE — BH INPATIENT PSYCHIATRY PROGRESS NOTE - PRN MEDS
MEDICATIONS  (PRN):  ammonium lactate 12% Lotion 1 Application(s) Topical two times a day PRN dry skin  hemorrhoidal Ointment 1 Application(s) Rectal every 6 hours PRN hemorrhoid discomfort  lactulose Syrup 10 Gram(s) Oral at bedtime PRN constipation  LORazepam     Tablet 1 milliGRAM(s) Oral two times a day PRN Agitation  nystatin Powder 1 Application(s) Topical two times a day PRN Fungal rash  OLANZapine Injectable 2.5 milliGRAM(s) IntraMuscular once PRN severe agitation  traZODone 25 milliGRAM(s) Oral at bedtime PRN insomnia  
MEDICATIONS  (PRN):  ammonium lactate 12% Lotion 1 Application(s) Topical two times a day PRN dry skin  hemorrhoidal Ointment 1 Application(s) Rectal every 6 hours PRN hemorrhoid discomfort  lactulose Syrup 10 Gram(s) Oral at bedtime PRN constipation  LORazepam     Tablet 1 milliGRAM(s) Oral two times a day PRN Agitation  nystatin Powder 1 Application(s) Topical two times a day PRN Fungal rash  OLANZapine Injectable 2.5 milliGRAM(s) IntraMuscular once PRN severe agitation  traZODone 25 milliGRAM(s) Oral at bedtime PRN insomnia  
MEDICATIONS  (PRN):  ammonium lactate 12% Lotion 1 Application(s) Topical two times a day PRN dry skin  hemorrhoidal Ointment 1 Application(s) Rectal every 6 hours PRN hemorrhoid discomfort  lactulose Syrup 10 Gram(s) Oral at bedtime PRN constipation  nystatin Powder 1 Application(s) Topical two times a day PRN Fungal rash  OLANZapine Injectable 2.5 milliGRAM(s) IntraMuscular once PRN severe agitation  traZODone 25 milliGRAM(s) Oral at bedtime PRN insomnia  
MEDICATIONS  (PRN):  ammonium lactate 12% Lotion 1 Application(s) Topical two times a day PRN dry skin  hemorrhoidal Ointment 1 Application(s) Rectal every 6 hours PRN hemorrhoid discomfort  lactulose Syrup 10 Gram(s) Oral at bedtime PRN constipation  LORazepam     Tablet 1 milliGRAM(s) Oral every 6 hours PRN anxiety  OLANZapine Injectable 2.5 milliGRAM(s) IntraMuscular once PRN severe agitation  traZODone 25 milliGRAM(s) Oral at bedtime PRN insomnia  
MEDICATIONS  (PRN):  ammonium lactate 12% Lotion 1 Application(s) Topical two times a day PRN dry skin  hemorrhoidal Ointment 1 Application(s) Rectal every 6 hours PRN hemorrhoid discomfort  lactulose Syrup 10 Gram(s) Oral at bedtime PRN constipation  LORazepam     Tablet 1 milliGRAM(s) Oral every 6 hours PRN anxiety  nystatin Powder 1 Application(s) Topical two times a day PRN Fungal rash  OLANZapine Injectable 2.5 milliGRAM(s) IntraMuscular once PRN severe agitation  traZODone 25 milliGRAM(s) Oral at bedtime PRN insomnia  
MEDICATIONS  (PRN):  ammonium lactate 12% Lotion 1 Application(s) Topical two times a day PRN dry skin  hemorrhoidal Ointment 1 Application(s) Rectal every 6 hours PRN hemorrhoid discomfort  lactulose Syrup 10 Gram(s) Oral at bedtime PRN constipation  LORazepam     Tablet 1 milliGRAM(s) Oral two times a day PRN Agitation  nystatin Powder 1 Application(s) Topical two times a day PRN Fungal rash  OLANZapine Injectable 2.5 milliGRAM(s) IntraMuscular once PRN severe agitation  traZODone 25 milliGRAM(s) Oral at bedtime PRN insomnia  
MEDICATIONS  (PRN):  ammonium lactate 12% Lotion 1 Application(s) Topical two times a day PRN dry skin  hemorrhoidal Ointment 1 Application(s) Rectal every 6 hours PRN hemorrhoid discomfort  lactulose Syrup 10 Gram(s) Oral at bedtime PRN constipation  LORazepam     Tablet 0.5 milliGRAM(s) Oral every 6 hours PRN anxiety  nystatin Powder 1 Application(s) Topical two times a day PRN Fungal rash  OLANZapine Injectable 2.5 milliGRAM(s) IntraMuscular once PRN severe agitation  traZODone 25 milliGRAM(s) Oral at bedtime PRN insomnia  
MEDICATIONS  (PRN):  ammonium lactate 12% Lotion 1 Application(s) Topical two times a day PRN dry skin  hemorrhoidal Ointment 1 Application(s) Rectal every 6 hours PRN hemorrhoid discomfort  lactulose Syrup 10 Gram(s) Oral at bedtime PRN constipation  LORazepam     Tablet 1 milliGRAM(s) Oral every 6 hours PRN anxiety  nystatin Powder 1 Application(s) Topical two times a day PRN Fungal rash  OLANZapine Injectable 2.5 milliGRAM(s) IntraMuscular once PRN severe agitation  traZODone 25 milliGRAM(s) Oral at bedtime PRN insomnia  
MEDICATIONS  (PRN):  ammonium lactate 12% Lotion 1 Application(s) Topical two times a day PRN dry skin  hemorrhoidal Ointment 1 Application(s) Rectal every 6 hours PRN hemorrhoid discomfort  lactulose Syrup 10 Gram(s) Oral at bedtime PRN constipation  LORazepam     Tablet 1 milliGRAM(s) Oral every 6 hours PRN anxiety  nystatin Powder 1 Application(s) Topical two times a day PRN Fungal rash  OLANZapine Injectable 2.5 milliGRAM(s) IntraMuscular once PRN severe agitation  traZODone 25 milliGRAM(s) Oral at bedtime PRN insomnia  
MEDICATIONS  (PRN):  fluPHENAZine 2.5 milliGRAM(s) Oral every 6 hours PRN agitation  fluPHENAZine  Injectable 2 milliGRAM(s) IntraMuscular once PRN severe agitation  traZODone 25 milliGRAM(s) Oral at bedtime PRN insomnia  
MEDICATIONS  (PRN):  fluPHENAZine 2.5 milliGRAM(s) Oral every 6 hours PRN agitation  fluPHENAZine  Injectable 2 milliGRAM(s) IntraMuscular once PRN severe agitation  traZODone 25 milliGRAM(s) Oral at bedtime PRN insomnia  
MEDICATIONS  (PRN):  ammonium lactate 12% Lotion 1 Application(s) Topical two times a day PRN dry skin  hemorrhoidal Ointment 1 Application(s) Rectal every 6 hours PRN hemorrhoid discomfort  lactulose Syrup 10 Gram(s) Oral at bedtime PRN constipation  nystatin Powder 1 Application(s) Topical two times a day PRN Fungal rash  OLANZapine Injectable 2.5 milliGRAM(s) IntraMuscular once PRN severe agitation  traZODone 25 milliGRAM(s) Oral at bedtime PRN insomnia  
MEDICATIONS  (PRN):  ammonium lactate 12% Lotion 1 Application(s) Topical two times a day PRN dry skin  hemorrhoidal Ointment 1 Application(s) Rectal every 6 hours PRN hemorrhoid discomfort  lactulose Syrup 10 Gram(s) Oral at bedtime PRN constipation  OLANZapine Injectable 2.5 milliGRAM(s) IntraMuscular once PRN severe agitation  traZODone 25 milliGRAM(s) Oral at bedtime PRN insomnia  
MEDICATIONS  (PRN):  ammonium lactate 12% Lotion 1 Application(s) Topical two times a day PRN dry skin  hemorrhoidal Ointment 1 Application(s) Rectal every 6 hours PRN hemorrhoid discomfort  lactulose Syrup 10 Gram(s) Oral at bedtime PRN constipation  LORazepam     Tablet 1 milliGRAM(s) Oral every 6 hours PRN anxiety  nystatin Powder 1 Application(s) Topical two times a day PRN Fungal rash  OLANZapine Injectable 2.5 milliGRAM(s) IntraMuscular once PRN severe agitation  traZODone 25 milliGRAM(s) Oral at bedtime PRN insomnia  
MEDICATIONS  (PRN):  ammonium lactate 12% Lotion 1 Application(s) Topical two times a day PRN dry skin  hemorrhoidal Ointment 1 Application(s) Rectal every 6 hours PRN hemorrhoid discomfort  lactulose Syrup 10 Gram(s) Oral at bedtime PRN constipation  nystatin Powder 1 Application(s) Topical two times a day PRN Fungal rash  OLANZapine Injectable 2.5 milliGRAM(s) IntraMuscular once PRN severe agitation  traZODone 25 milliGRAM(s) Oral at bedtime PRN insomnia  
MEDICATIONS  (PRN):  ammonium lactate 12% Lotion 1 Application(s) Topical two times a day PRN dry skin  hemorrhoidal Ointment 1 Application(s) Rectal every 6 hours PRN hemorrhoid discomfort  lactulose Syrup 10 Gram(s) Oral at bedtime PRN constipation  LORazepam     Tablet 0.5 milliGRAM(s) Oral every 6 hours PRN anxiety  nystatin Powder 1 Application(s) Topical two times a day PRN Fungal rash  OLANZapine Injectable 2.5 milliGRAM(s) IntraMuscular once PRN severe agitation  traZODone 25 milliGRAM(s) Oral at bedtime PRN insomnia  
MEDICATIONS  (PRN):  ammonium lactate 12% Lotion 1 Application(s) Topical two times a day PRN dry skin  hemorrhoidal Ointment 1 Application(s) Rectal every 6 hours PRN hemorrhoid discomfort  lactulose Syrup 10 Gram(s) Oral at bedtime PRN constipation  nystatin Powder 1 Application(s) Topical two times a day PRN Fungal rash  OLANZapine Injectable 2.5 milliGRAM(s) IntraMuscular once PRN severe agitation  traZODone 25 milliGRAM(s) Oral at bedtime PRN insomnia  
MEDICATIONS  (PRN):  ammonium lactate 12% Lotion 1 Application(s) Topical two times a day PRN dry skin  hemorrhoidal Ointment 1 Application(s) Rectal every 6 hours PRN hemorrhoid discomfort  lactulose Syrup 10 Gram(s) Oral at bedtime PRN constipation  LORazepam     Tablet 0.5 milliGRAM(s) Oral every 6 hours PRN anxiety  nystatin Powder 1 Application(s) Topical two times a day PRN Fungal rash  OLANZapine Injectable 2.5 milliGRAM(s) IntraMuscular once PRN severe agitation  traZODone 25 milliGRAM(s) Oral at bedtime PRN insomnia  
MEDICATIONS  (PRN):  ammonium lactate 12% Lotion 1 Application(s) Topical two times a day PRN dry skin  hemorrhoidal Ointment 1 Application(s) Rectal every 6 hours PRN hemorrhoid discomfort  lactulose Syrup 10 Gram(s) Oral at bedtime PRN constipation  LORazepam     Tablet 1 milliGRAM(s) Oral two times a day PRN Agitation  nystatin Powder 1 Application(s) Topical two times a day PRN Fungal rash  OLANZapine Injectable 2.5 milliGRAM(s) IntraMuscular once PRN severe agitation  traZODone 25 milliGRAM(s) Oral at bedtime PRN insomnia  
MEDICATIONS  (PRN):  ammonium lactate 12% Lotion 1 Application(s) Topical two times a day PRN dry skin  hemorrhoidal Ointment 1 Application(s) Rectal every 6 hours PRN hemorrhoid discomfort  lactulose Syrup 10 Gram(s) Oral at bedtime PRN constipation  LORazepam     Tablet 1 milliGRAM(s) Oral every 6 hours PRN anxiety  nystatin Powder 1 Application(s) Topical two times a day PRN Fungal rash  OLANZapine Injectable 2.5 milliGRAM(s) IntraMuscular once PRN severe agitation  traZODone 25 milliGRAM(s) Oral at bedtime PRN insomnia  
MEDICATIONS  (PRN):  ammonium lactate 12% Lotion 1 Application(s) Topical two times a day PRN dry skin  hemorrhoidal Ointment 1 Application(s) Rectal every 6 hours PRN hemorrhoid discomfort  lactulose Syrup 10 Gram(s) Oral at bedtime PRN constipation  LORazepam     Tablet 1 milliGRAM(s) Oral every 6 hours PRN anxiety  OLANZapine Injectable 2.5 milliGRAM(s) IntraMuscular once PRN severe agitation  traZODone 25 milliGRAM(s) Oral at bedtime PRN insomnia  
MEDICATIONS  (PRN):  ammonium lactate 12% Lotion 1 Application(s) Topical two times a day PRN dry skin  hemorrhoidal Ointment 1 Application(s) Rectal every 6 hours PRN hemorrhoid discomfort  lactulose Syrup 10 Gram(s) Oral at bedtime PRN constipation  nystatin Powder 1 Application(s) Topical two times a day PRN Fungal rash  OLANZapine Injectable 2.5 milliGRAM(s) IntraMuscular once PRN severe agitation  traZODone 25 milliGRAM(s) Oral at bedtime PRN insomnia  
MEDICATIONS  (PRN):  ammonium lactate 12% Lotion 1 Application(s) Topical two times a day PRN dry skin  hemorrhoidal Ointment 1 Application(s) Rectal every 6 hours PRN hemorrhoid discomfort  lactulose Syrup 10 Gram(s) Oral at bedtime PRN constipation  LORazepam     Tablet 0.5 milliGRAM(s) Oral every 6 hours PRN anxiety  nystatin Powder 1 Application(s) Topical two times a day PRN Fungal rash  OLANZapine Injectable 2.5 milliGRAM(s) IntraMuscular once PRN severe agitation  traZODone 25 milliGRAM(s) Oral at bedtime PRN insomnia  
MEDICATIONS  (PRN):  ammonium lactate 12% Lotion 1 Application(s) Topical two times a day PRN dry skin  hemorrhoidal Ointment 1 Application(s) Rectal every 6 hours PRN hemorrhoid discomfort  lactulose Syrup 10 Gram(s) Oral at bedtime PRN constipation  LORazepam     Tablet 1 milliGRAM(s) Oral every 6 hours PRN anxiety  nystatin Powder 1 Application(s) Topical two times a day PRN Fungal rash  OLANZapine Injectable 2.5 milliGRAM(s) IntraMuscular once PRN severe agitation  traZODone 25 milliGRAM(s) Oral at bedtime PRN insomnia  
MEDICATIONS  (PRN):  ammonium lactate 12% Lotion 1 Application(s) Topical two times a day PRN dry skin  hemorrhoidal Ointment 1 Application(s) Rectal every 6 hours PRN hemorrhoid discomfort  lactulose Syrup 10 Gram(s) Oral at bedtime PRN constipation  LORazepam     Tablet 0.5 milliGRAM(s) Oral every 6 hours PRN anxiety  nystatin Powder 1 Application(s) Topical two times a day PRN Fungal rash  OLANZapine Injectable 2.5 milliGRAM(s) IntraMuscular once PRN severe agitation  traZODone 25 milliGRAM(s) Oral at bedtime PRN insomnia  
MEDICATIONS  (PRN):  ammonium lactate 12% Lotion 1 Application(s) Topical two times a day PRN dry skin  hemorrhoidal Ointment 1 Application(s) Rectal every 6 hours PRN hemorrhoid discomfort  lactulose Syrup 10 Gram(s) Oral at bedtime PRN constipation  OLANZapine Injectable 2.5 milliGRAM(s) IntraMuscular once PRN severe agitation  traZODone 25 milliGRAM(s) Oral at bedtime PRN insomnia  
MEDICATIONS  (PRN):  fluPHENAZine 2.5 milliGRAM(s) Oral every 6 hours PRN agitation  fluPHENAZine  Injectable 2 milliGRAM(s) IntraMuscular once PRN severe agitation  traZODone 25 milliGRAM(s) Oral at bedtime PRN insomnia  
MEDICATIONS  (PRN):  ammonium lactate 12% Lotion 1 Application(s) Topical two times a day PRN dry skin  hemorrhoidal Ointment 1 Application(s) Rectal every 6 hours PRN hemorrhoid discomfort  lactulose Syrup 10 Gram(s) Oral at bedtime PRN constipation  nystatin Powder 1 Application(s) Topical two times a day PRN Fungal rash  OLANZapine Injectable 2.5 milliGRAM(s) IntraMuscular once PRN severe agitation  traZODone 25 milliGRAM(s) Oral at bedtime PRN insomnia  
MEDICATIONS  (PRN):  ammonium lactate 12% Lotion 1 Application(s) Topical two times a day PRN dry skin  hemorrhoidal Ointment 1 Application(s) Rectal every 6 hours PRN hemorrhoid discomfort  lactulose Syrup 10 Gram(s) Oral at bedtime PRN constipation  LORazepam     Tablet 0.5 milliGRAM(s) Oral every 6 hours PRN anxiety  nystatin Powder 1 Application(s) Topical two times a day PRN Fungal rash  OLANZapine Injectable 2.5 milliGRAM(s) IntraMuscular once PRN severe agitation  traZODone 25 milliGRAM(s) Oral at bedtime PRN insomnia  
MEDICATIONS  (PRN):  ammonium lactate 12% Lotion 1 Application(s) Topical two times a day PRN dry skin  hemorrhoidal Ointment 1 Application(s) Rectal every 6 hours PRN hemorrhoid discomfort  lactulose Syrup 10 Gram(s) Oral at bedtime PRN constipation  LORazepam     Tablet 1 milliGRAM(s) Oral two times a day PRN Agitation  nystatin Powder 1 Application(s) Topical two times a day PRN Fungal rash  OLANZapine Injectable 2.5 milliGRAM(s) IntraMuscular once PRN severe agitation  traZODone 25 milliGRAM(s) Oral at bedtime PRN insomnia  
MEDICATIONS  (PRN):  hemorrhoidal Ointment 1 Application(s) Rectal every 6 hours PRN hemorrhoid discomfort  lactulose Syrup 10 Gram(s) Oral at bedtime PRN constipation  OLANZapine Injectable 2.5 milliGRAM(s) IntraMuscular once PRN severe agitation  traZODone 25 milliGRAM(s) Oral at bedtime PRN insomnia  
MEDICATIONS  (PRN):  fluPHENAZine 2.5 milliGRAM(s) Oral every 6 hours PRN agitation  fluPHENAZine  Injectable 2 milliGRAM(s) IntraMuscular once PRN severe agitation  traZODone 25 milliGRAM(s) Oral at bedtime PRN insomnia

## 2021-11-20 NOTE — ED CLERICAL - NS ED CLERK NOTE PRE-ARRIVAL INFORMATION; ADDITIONAL PRE-ARRIVAL INFORMATION
Minimally responsive BP 93/50 HR 87 FS WNL.  Usually alert, oriented X 3, ambulatory.  Hx bladder Ca, DM, asthma, Parkinsons. At [facility] for hallucinations, agitation.

## 2021-11-20 NOTE — BH INPATIENT PSYCHIATRY PROGRESS NOTE - [id number]_PSY_ALL_CORE
Schizophrenia   F20.9  

## 2021-11-20 NOTE — BH INPATIENT PSYCHIATRY PROGRESS NOTE - MSE UNSTRUCTURED FT
Patient appears her stated age. Hygiene and grooming are poor.  She is awake, but delirious. Speech is fluent. Reported mood is "good". Affect is less labile. Thought process remains disorganized and thought content is remarkable for paranoia with reduced somatic delusions. Patient is internally preoccupied and intrusive. Denies SI/HI. Poor insight and judgement. Impulse control intact at the time of exam. 
Patient appears her stated age. Hygiene and grooming are poor. Fungal rash improving. She is awake and alert. Speech is fluent. Reported mood is "good". Affect is labile. Thought process remains disorganized and thought content is remarkable for paranoia and somatic delusions. Patient remains religiously preoccupied. Denies SI/HI. No AVH reported. Poor insight and judgement. Impulse control intact at the time of exam. 
Patient appears her stated age. Hygiene and grooming are poor.  She is awake and alert. Speech is fluent. Reported mood is "not good". Affect is labile. Thought process remains disorganized and thought content is remarkable for paranoia with somatic delusions. Patient is internally preoccupied and intrusive. Denies SI/HI. Poor insight and judgement. Impulse control intact at the time of exam. 
Patient appears her stated age. Hygiene and grooming are poor. Fungal rash improving. She is awake and alert. Speech is fluent. Reported mood is "good". Affect is anxious. Thought process remains disorganized and thought content is remarkable for paranoia and somatic delusions. Patient remains religiously preoccupied. Denies SI/HI. No AVH reported. Limited insight and judgement. Impulse control intact at the time of exam. 
Patient is awake and alert. Affect is guarded but labile, laughs at one point without provocation. Speech is fluent. TP is concrete, coherent. Some paranoid delusions reported. No AH. No suicidal ideations. Poor insight. 
Patient is a 70 year old female who looks her stated age. Hygiene and grooming are fair. She is awake and alert. Speech is fluent. Thought process is concrete, coherent. Affect is guarded. She is grossly psychotic, carries a bible with her at all times.  Some paranoid delusions reported. No AH. No suicidal ideations. Poor insight and judgement. Impulse is poor.
Patient appears her stated age. Hygiene and grooming are poor. Fungal rash improving. She is awake and alert. Speech is fluent. Reported mood is "good". Affect is anxious. Thought process is less disorganized and thought content is remarkable for paranoia. Patient remains religiously preoccupied. Denies SI/HI. No AVH reported. Limited insight and judgement. Impulse control intact at the time of exam. 
Patient appears her stated age. Hygiene and grooming are poor. Patient has a fungal rash on her whole body. She is awake and alert. Speech is fluent. Reported mood is "good". Affect is anxious. Thought process is disorganized and thought content is remarkable for paranoia. Patient is religiously preoccupied. Denies SI/HI. No AVH reported. Limited insight and judgement. Impulse control intact at the time of exam. 
Patient appears her stated age. Hygiene and grooming are poor.  She is awake, but delirious. Speech is fluent. Reported mood is "good". Affect is less labile. Thought process remains disorganized and thought content is remarkable for paranoia with reduced somatic delusions. Patient is internally preoccupied and intrusive. Denies SI/HI. Poor insight and judgement. Impulse control intact at the time of exam. 
Patient appears her stated age. Hygiene and grooming are fair. She is awake and alert. Superficially cooperative on encounter with fair eye contact. Speech is fluent but hard to understand at times. Reported mood is "I'm sad". Affect is suspicious and guarded. Thought process is disorganized today. Thought content remarkable for paranoid delusions--believes someone has stolen all her money, denies SI/HI. No AVH reported but appears internally preoccupied. Limited insight and judgement. Impulse control intact at the time of exam.
Patient appears her stated age, thin build. Hygiene and grooming are fair, dressed in hospital gown. She is awake and alert. Superficially cooperative with fair eye contact. Speech is fluent but hard to understand due to denture at times. Reported mood is "okay". Affect is irritable, anxious. Thought process is disorganized today. Thought content remarkable for paranoid delusions--believes someone has stolen all her money, denies SI/HI. No AVH reported but appears internally preoccupied. Limited insight and judgment. Impulse control is impaired.
Patient appears her stated age, thin build. Hygiene and grooming are fair, dressed in hospital gown. She is awake and alert. Superficially cooperative with fair eye contact. Speech is fluent but hard to understand due to denture at times. Reported mood is "I'm tired". Affect is irritable, anxious. Thought process is disorganized today. Thought content remarkable for paranoid delusions--believes someone has stolen all her money, denies SI/HI. No AVH reported but appears internally preoccupied. Limited insight and judgment. Impulse control is impaired.
Patient appears her stated age. Hygiene and grooming are poor.  She is awake and alert. Speech is fluent. Reported mood is "good". Affect is less labile. Thought process remains disorganized and thought content is remarkable for paranoia with reduced somatic delusions. Patient is much less religiously preoccupied. Denies SI/HI. Pt. is responding to internal stimuli. Poor insight and judgement. Impulse control intact at the time of exam. 
Patient appears her stated age, thin build. Hygiene and grooming are fair, dressed in hospital gown. She is awake and alert. Superficially cooperative with fair eye contact. Mood is depressed and affect is labile. Speech is high volume and dysarthric due to dentures. thought process is disorganized, content is remarkable for paranoid delusions. No SHIIP and AVH reported, but appears internally preoccupied. Limited insight and judgement. Impulse control is impaired.
Patient appears her stated age, thin build. Hygiene and grooming are fair, dressed in hospital gown. She is awake and alert. Superficially cooperative with fair eye contact. Mood is depressed and affect is labile. Speech is low volume and dysarthric due to dentures. hought process remains disorganized, content is remarkable for paranoid delusions - believing her son  and someone has stolen her money. No SHIIP and AVH reported, but appears internally preoccupied. Limited insight and judgement. Impulse control is impaired.
Patient appears her stated age. Hygiene and grooming are poor. Patient has a fungal rash on her whole body. She is awake and alert. Speech is fluent. Reported mood is "good". Affect is anxious. Thought process is disorganized and thought content is remarkable for paranoia. Patient is religiously preoccupied. Denies SI/HI. No AVH reported. Limited insight and judgement. Impulse control intact at the time of exam. 
Patient appears her stated age. Hygiene and grooming are fair. She is awake and alert. Superficially cooperative on encounter with fair eye contact. Speech is fluent but hard to understand at times. Reported mood is "I'm sad". Affect is suspicious and guarded. Thought process is disorganized today. Thought content remarkable for paranoid delusions--believes someone has stolen all her money, denies SI/HI. No AVH reported but appears internally preoccupied. Limited insight and judgement. Impulse control intact at the time of exam.
Patient appears her stated age. Hygiene and grooming are poor.  She is awake and alert. Speech is fluent. Reported mood is "good". Affect is less labile. Thought process remains disorganized and thought content is remarkable for paranoia with reduced somatic delusions. Patient is internally preoccupied. Denies SI/HI. Poor insight and judgement. Impulse control intact at the time of exam. 
Patient appears her stated age. Hygiene and grooming are poor.  She is awake, but delirious. Speech is fluent. Reported mood is "good". Affect is less labile. Thought process remains disorganized and thought content is remarkable for paranoia with reduced somatic delusions. Patient is internally preoccupied. Denies SI/HI. Poor insight and judgement. Impulse control intact at the time of exam. 
Patient appears her stated age, thin build. Hygiene and grooming are fair, dressed in hospital gown. She is awake and alert. Superficially cooperative with fair eye contact. Mood is less depressed and affect is normal. Speech is low volume and dysarthric due to dentures. thought process is less disorganized, content is remarkable for paranoid delusions - believing her son  and someone has stolen her money. No SHIIP and AVH reported, but appears internally preoccupied. Limited insight and judgement. Impulse control is impaired.
Patient appears her stated age. Hygiene and grooming are fair. She is awake and alert. Speech is fluent. Reported mood is "fine". Affect is suspicious and incongruent to reported mood. Thought process is disorganized. Unable to assess thought content but patient appears sacred. Low suspicion for SI/HI. No AVH reported but appears internally preoccupied. Limited insight and judgement. Impulse control tenuous at the time of exam. 
Patient appears her stated age. Hygiene and grooming are poor. Fungal rash improving. She is awake and alert. Speech is fluent. Reported mood is "good". Affect is anxious. Thought process remains disorganized and thought content is remarkable for paranoia and somatic delusions. Patient remains religiously preoccupied. Denies SI/HI. No AVH reported. Limited insight and judgement. Impulse control intact at the time of exam. 
Patient appears her stated age. Hygiene and grooming are poor. Fungal rash improving. She is awake and alert. Speech is fluent. Reported mood is "good". Affect is less labile. Thought process remains disorganized and thought content is remarkable for paranoia and somatic delusions. Patient is less religiously preoccupied. Denies SI/HI. No AVH reported. Poor insight and judgement. Impulse control intact at the time of exam. 
Patient appears her stated age. Hygiene and grooming are poor.  She is awake and alert. Speech is fluent. Reported mood is "good". Affect is less labile. Thought process remains disorganized and thought content is remarkable for paranoia with reduced somatic delusions. Patient is much less religiously preoccupied. Denies SI/HI. No AVH reported. Poor insight and judgement. Impulse control intact at the time of exam. 
Patient appears her stated age. Hygiene and grooming are poor. Fungal rash improving. She is awake and alert. Speech is fluent. Reported mood is "good". Affect is less labile. Thought process remains disorganized and thought content is remarkable for paranoia with reduced somatic delusions. Patient is less religiously preoccupied. Denies SI/HI. No AVH reported. Poor insight and judgement. Impulse control intact at the time of exam. 
Patient is a 70 year old female who looks her stated age. Hygiene and grooming are fair. She is awake and alert. Speech is fluent. Mood is fine. Affect is tired and guarded. Thought process is concrete, coherent. She is grossly psychotic, carries a bible with her at all times.  Some paranoid delusions reported. No AH. No suicidal ideations. Poor insight and judgement. Impulse is poor.
Patient appears her stated age. Hygiene and grooming are fair. She is awake and alert. Speech is fluent. Reported mood is "Im doing fine". Affect is less suspicious, somewhat anxious. Thought process is less disorganized today. Thought content unremarkable today, denies SI/HI. No AVH reported but appears internally preoccupied. Limited insight and judgement. Impulse control intact at the time of exam. 
Patient appears her stated age. Hygiene and grooming are poor. Fungal rash improving. She is awake and alert. Speech is fluent. Reported mood is "good". Affect is anxious. Thought process remains disorganized and thought content is remarkable for paranoia and somatic delusions. Patient remains religiously preoccupied. Denies SI/HI. No AVH reported. Limited insight and judgement. Impulse control intact at the time of exam. 
Patient appears her stated age, thin build. Hygiene and grooming are fair, dressed in hospital gown. She is awake and alert. Superficially cooperative with fair eye contact. Mood is depressed and affect is labile. Speech is low volume and dysarthric due to dentures. thought process remains disorganized, content is remarkable for paranoid delusions - believing her son  and someone has stolen her money. No SHIIP and AVH reported, but appears internally preoccupied. Limited insight and judgement. Impulse control is impaired.
Patient appears her stated age, thin build. Hygiene and grooming are fair, dressed in hospital gown. She is awake and alert. Superficially cooperative with fair eye contact. Mood is depressed and affect is labile. Speech is high volume and dysarthric due to dentures. thought process is disorganized, content is remarkable for paranoid delusions. No SHIIP and AVH reported, but appears internally preoccupied. Limited insight and judgement. Impulse control is impaired.

## 2021-11-20 NOTE — BH INPATIENT PSYCHIATRY PROGRESS NOTE - NSTXFALLPROGRES_PSY_ALL_CORE
Improving
No Change
Improving
No Change
Improving
No Change
Improving
No Change
Improving
Improving
No Change
Improving
No Change
Improving

## 2021-11-20 NOTE — BH INPATIENT PSYCHIATRY PROGRESS NOTE - MSE OPTIONS
Structured MSE
Unstructured MSE
Structured MSE
Unstructured MSE
Structured MSE

## 2021-11-20 NOTE — ED PROVIDER NOTE - PROGRESS NOTE DETAILS
MD Dillard:  case discussed with [employer] Att[facility], Dr. Polanco, who does not believe cause of AMS is due to CVA.  Recommending pursuing alternative etiology Pa DO PGY-2: ordered zyprexa 2.5mg IM as pt is becoming progressively more agitated

## 2021-11-20 NOTE — BH INPATIENT PSYCHIATRY PROGRESS NOTE - NSTXDISORGDATEEST_PSY_ALL_CORE
14-Oct-2021
13-Oct-2021
14-Oct-2021

## 2021-11-20 NOTE — BH INPATIENT PSYCHIATRY PROGRESS NOTE - NSTXPSYCHOGOAL_PSY_ALL_CORE
Will ask for PRN medication to manage hallucinations
Will ask for PRN medication to manage hallucinations
Will report using relaxation skills 3 times a day to reduce anxiety about delusions/hallucinations
Will ask for PRN medication to manage hallucinations
Will ask for PRN medication to manage hallucinations
Will report using relaxation skills 3 times a day to reduce anxiety about delusions/hallucinations
Will ask for PRN medication to manage hallucinations
Will report using relaxation skills 3 times a day to reduce anxiety about delusions/hallucinations
Will ask for PRN medication to manage hallucinations
Will report using relaxation skills 3 times a day to reduce anxiety about delusions/hallucinations
Will report using relaxation skills 3 times a day to reduce anxiety about delusions/hallucinations
Will ask for PRN medication to manage hallucinations
Will report using relaxation skills 3 times a day to reduce anxiety about delusions/hallucinations
Will ask for PRN medication to manage hallucinations
Will report using relaxation skills 3 times a day to reduce anxiety about delusions/hallucinations
Will ask for PRN medication to manage hallucinations
Will report using relaxation skills 3 times a day to reduce anxiety about delusions/hallucinations
Will ask for PRN medication to manage hallucinations
Will ask for PRN medication to manage hallucinations
Will report using relaxation skills 3 times a day to reduce anxiety about delusions/hallucinations

## 2021-11-20 NOTE — BH INPATIENT PSYCHIATRY PROGRESS NOTE - NSTXPSYCHOINTERMD_PSY_ALL_CORE
Prolixin titration 
Prolixin titration 
Med titration 
Prolixin titration 
Med titration 
Prolixin titration 
Med titration 
Prolixin titration 
Med titration 
Prolixin titration 
Med titration 
Med titration 
Prolixin titration 
Prolixin titration 
Med titration 
Prolixin titration 
Med titration 
Prolixin titration 
Prolixin titration

## 2021-11-20 NOTE — CHART NOTE - NSCHARTNOTEFT_GEN_A_CORE
[facility] Inpatient to ED Transfer Summary    Reason for Transfer/Medical Summary:  70 year old female with PMHx Bladder CA, Parkinsons, DM2, Asthma, Hypothyroidism now admitted to [facility] for further management of hallucinations.   Notified by RN that pt is unresponsive while taking vital signs. Per nursing staff, pt is A&O x3 at baseline and able to follow commands. Pt currently is unable to follow commands and minimally responsive to sternal rub. Will transfer to [facility] ED for further workup and evaluation.     PAST MEDICAL & SURGICAL HISTORY:  Parkinson disease    Seizure disorder    Hypothyroidism    Type II diabetes mellitus    Hyperlipidemia    Schizophrenia    Hemorrhoids    No significant past surgical history        Allergies    penicillin (Hives; Rash)    Intolerances        MEDICATIONS  (STANDING):  benztropine 1 milliGRAM(s) Oral two times a day  carbidopa/levodopa  25/100 1 Tablet(s) Oral three times a day  clopidogrel Tablet 75 milliGRAM(s) Oral daily  clotrimazole/betamethasone Cream 1 Application(s) Topical daily  clotrimazole/betamethasone Cream 1 Application(s) Topical two times a day  diVALproex Sprinkle 750 milliGRAM(s) Oral at bedtime  gabapentin 400 milliGRAM(s) Oral <User Schedule>  latanoprost 0.005% Ophthalmic Solution 1 Drop(s) Both EYES at bedtime  levothyroxine 75 MICROGram(s) Oral <User Schedule>  lisinopril 10 milliGRAM(s) Oral daily  LORazepam     Tablet 1 milliGRAM(s) Oral <User Schedule>  metFORMIN 500 milliGRAM(s) Oral daily  metoprolol tartrate 25 milliGRAM(s) Oral two times a day  multivitamin/minerals 1 Tablet(s) Oral daily  polyethylene glycol 3350 8.5 Gram(s) Oral daily  risperiDONE   Tablet 1.5 milliGRAM(s) Oral <User Schedule>  senna 2 Tablet(s) Oral at bedtime  traZODone 50 milliGRAM(s) Oral at bedtime    MEDICATIONS  (PRN):  ammonium lactate 12% Lotion 1 Application(s) Topical two times a day PRN dry skin  hemorrhoidal Ointment 1 Application(s) Rectal every 6 hours PRN hemorrhoid discomfort  lactulose Syrup 10 Gram(s) Oral at bedtime PRN constipation  LORazepam     Tablet 1 milliGRAM(s) Oral two times a day PRN Agitation  nystatin Powder 1 Application(s) Topical two times a day PRN Fungal rash  OLANZapine Injectable 2.5 milliGRAM(s) IntraMuscular once PRN severe agitation  traZODone 25 milliGRAM(s) Oral at bedtime PRN insomnia      Vital Signs Last 24 Hrs  T(C): 36.4 (20 Nov 2021 08:05), Max: 36.4 (20 Nov 2021 08:05)  T(F): 97.6 (20 Nov 2021 08:05), Max: 97.6 (20 Nov 2021 08:05)  HR: 85 (20 Nov 2021 08:05) (85 - 85)  BP: 126/80 (20 Nov 2021 08:05) (126/80 - 126/80)  BP(mean): --  RR: --  SpO2: --  CAPILLARY BLOOD GLUCOSE      POCT Blood Glucose.: 125 mg/dL (20 Nov 2021 22:16)        PHYSICAL EXAM:  GENERAL: NAD, well-developed  HEAD:  Atraumatic, Normocephalic  EYES: EOMI, PERRLA, conjunctiva and sclera clear  NECK: Supple, No JVD  CHEST/LUNG: Clear to auscultation bilaterally; No wheeze  HEART: Regular rate and rhythm; No murmurs, rubs, or gallops  ABDOMEN: Soft, Nontender, Nondistended; Bowel sounds present  EXTREMITIES:  2+ Peripheral Pulses, No clubbing, cyanosis, or edema  PSYCH: AAOx3  NEUROLOGY: non-focal  SKIN: No rashes or lesions    LABS:                    Psychiatry Section:  Psychiatric Summary/[facility] admitting diagnosis: Hallucinations    Psychiatric Recommendations: please page [phone] for any questions that may arise or dispo    Observation status (check one):   (X) Constant Observation  ( ) Enhanced care  ( ) Routine checks    Risk Status (check all that apply if present):  ( ) at risk for suicide/self-injury  (X) at risk for aggressive behavior  ( ) at risk for elopement  ( ) other risk:

## 2021-11-20 NOTE — BH INPATIENT PSYCHIATRY PROGRESS NOTE - NSTXPROBFALL_PSY_ALL_CORE
FALL RISK

## 2021-11-20 NOTE — ED PROVIDER NOTE - ATTENDING CONTRIBUTION TO CARE
MD Dillard:  patient seen and evaluated with the resident.  I was present for key portions of the History & Physical, and I agree with the Impression & Plan.  MD Dillard:  69 yo F, bib EMS from [facility] for acute change in mental status.     Per [facility] PA:   Baseline mental status = AAOx3, ambulatory  Change in mental status noted = 10:15pm  Last known normal = 9:00 pm MD Dillard:  patient seen and evaluated with the resident.  I was present for key portions of the History & Physical, and I agree with the Impression & Plan.  MD Dillard:  69 yo F, bib EMS from [facility] for acute change in mental status.   Call collateral obtained from [facility] TAYO Fonseca  Baseline mental status = AAOx3, ambulatory  Change in mental status noted = 10:15pm  Last known normal = 9:30 pm, patient was walking around unit  VS:  wnl  PE:  adult F, thin habitus, slurred speech,  RRR, CTA B  Abd: soft, ND.   Neuro: +R pupil 7mm and unresponsive, L pupil 4mm and responsive.  Moving all 4 extremities. Mental status:  slurred speech; cannot articulate weakness/numbness, uncooperative with physical exam.   Impression:  acute-onset change in mental status with dilated unresponsive R pupil  Plan:  appropriate code stroke called, within TPA window.  Awaiting Neuro recs.

## 2021-11-20 NOTE — BH INPATIENT PSYCHIATRY PROGRESS NOTE - NSTXDISORGPROGRES_PSY_ALL_CORE
Improving
Worsening
Improving
No Change
No Change
Improving
Improving
No Change
Improving
No Change
Improving
Worsening
Improving
Improving
No Change
No Change
Improving
Worsening
No Change
Worsening
Improving
Worsening
Improving
Worsening
Improving
No Change
Improving
No Change

## 2021-11-20 NOTE — BH INPATIENT PSYCHIATRY PROGRESS NOTE - NSTXPSYCHODATETRGT_PSY_ALL_CORE
08-Nov-2021
22-Nov-2021
27-Oct-2021
08-Nov-2021
08-Nov-2021
10-Nov-2021
08-Nov-2021
27-Oct-2021
08-Nov-2021
27-Oct-2021
27-Oct-2021
22-Nov-2021
08-Nov-2021
08-Nov-2021
27-Oct-2021
22-Nov-2021
01-Dec-2021
27-Oct-2021
27-Oct-2021
10-Nov-2021
01-Dec-2021
22-Nov-2021
27-Oct-2021
22-Nov-2021
27-Oct-2021
08-Nov-2021
10-Nov-2021
22-Nov-2021
01-Dec-2021
27-Oct-2021
22-Nov-2021
22-Nov-2021
01-Dec-2021
22-Nov-2021
27-Oct-2021
22-Nov-2021

## 2021-11-20 NOTE — BH INPATIENT PSYCHIATRY PROGRESS NOTE - NSBHATTESTSEENBY_PSY_A_CORE
attending Psychiatrist without NP/Trainee
Attending Psychiatrist supervising NP/Trainee, meeting pt...
Attending Psychiatrist supervising NP/Trainee, meeting pt...
attending Psychiatrist without NP/Trainee
attending Psychiatrist without NP/Trainee
Attending Psychiatrist supervising NP/Trainee, meeting pt...
NP without Attending Psychiatrist
attending Psychiatrist without NP/Trainee
Attending Psychiatrist supervising NP/Trainee, meeting pt...
attending Psychiatrist without NP/Trainee
NP without Attending Psychiatrist
attending Psychiatrist without NP/Trainee
NP without Attending Psychiatrist
Attending Psychiatrist supervising NP/Trainee, meeting pt...
attending Psychiatrist without NP/Trainee
Attending Psychiatrist supervising NP/Trainee, meeting pt...
attending Psychiatrist without NP/Trainee
Attending Psychiatrist supervising NP/Trainee, meeting pt...
attending Psychiatrist without NP/Trainee
Attending Psychiatrist supervising NP/Trainee, meeting pt...

## 2021-11-20 NOTE — BH INPATIENT PSYCHIATRY PROGRESS NOTE - NSBHCONTPROVIDER_PSY_ALL_CORE
Yes...

## 2021-11-20 NOTE — STROKE CODE NOTE - ER ARRIVAL: DATE/TIME
Problem: Fall Risk  Goal: Patient will remain free from falls  Patient has all fall precautions in place  Outcome: Progressing     Problem: Safety - Medical Restraint  Goal: Free from restraint(s) (Restraint for Interference with Medical Device)  Patient is remaining free from restraints  Outcome: Progressing           20-Nov-2021 22:52

## 2021-11-20 NOTE — BH INPATIENT PSYCHIATRY PROGRESS NOTE - NSBHMETABOLIC_PSY_ALL_CORE_FT
BMI: BMI (kg/m2): 25.2 (11-06-21 @ 15:58)  HbA1c: A1C with Estimated Average Glucose Result: 5.6 % (10-15-21 @ 11:00)    Glucose: POCT Blood Glucose.: 61 mg/dL (10-19-21 @ 11:59)    BP: 126/80 (11-20-21 @ 08:05) (126/80 - 141/78)  Lipid Panel: Date/Time: 10-14-21 @ 09:38  Cholesterol, Serum: 159  Direct LDL: --  HDL Cholesterol, Serum: 66  Total Cholesterol/HDL Ration Measurement: --  Triglycerides, Serum: 100

## 2021-11-20 NOTE — ED ADULT TRIAGE NOTE - CHIEF COMPLAINT QUOTE
Pt brought in by EMS from [place] [id number] with staff member at bedside. As per EMS, staff members called due to pt being hypotensive (80's systolic) and pt was found to be altered around 8pm. . Hx of seizure, parkinson's, schizophrenia. [facility] called to evaluate for stroke. Stroke code called.

## 2021-11-20 NOTE — BH INPATIENT PSYCHIATRY PROGRESS NOTE - NSTXDCOTHRINTERMD_PSY_ALL_CORE
Med titration

## 2021-11-20 NOTE — BH INPATIENT PSYCHIATRY PROGRESS NOTE - CURRENT MEDICATION
MEDICATIONS  (STANDING):  benztropine 1 milliGRAM(s) Oral two times a day  carbidopa/levodopa  25/100 1 Tablet(s) Oral three times a day  clopidogrel Tablet 75 milliGRAM(s) Oral daily  clotrimazole/betamethasone Cream 1 Application(s) Topical two times a day  clotrimazole/betamethasone Cream 1 Application(s) Topical daily  diVALproex Sprinkle 750 milliGRAM(s) Oral at bedtime  gabapentin 400 milliGRAM(s) Oral <User Schedule>  latanoprost 0.005% Ophthalmic Solution 1 Drop(s) Both EYES at bedtime  levothyroxine 75 MICROGram(s) Oral <User Schedule>  lisinopril 10 milliGRAM(s) Oral daily  LORazepam     Tablet 1 milliGRAM(s) Oral <User Schedule>  metFORMIN 500 milliGRAM(s) Oral daily  metoprolol tartrate 25 milliGRAM(s) Oral two times a day  multivitamin/minerals 1 Tablet(s) Oral daily  polyethylene glycol 3350 8.5 Gram(s) Oral daily  risperiDONE   Tablet 1.5 milliGRAM(s) Oral <User Schedule>  senna 2 Tablet(s) Oral at bedtime  traZODone 50 milliGRAM(s) Oral at bedtime    MEDICATIONS  (PRN):  ammonium lactate 12% Lotion 1 Application(s) Topical two times a day PRN dry skin  hemorrhoidal Ointment 1 Application(s) Rectal every 6 hours PRN hemorrhoid discomfort  lactulose Syrup 10 Gram(s) Oral at bedtime PRN constipation  nystatin Powder 1 Application(s) Topical two times a day PRN Fungal rash  OLANZapine Injectable 2.5 milliGRAM(s) IntraMuscular once PRN severe agitation  traZODone 25 milliGRAM(s) Oral at bedtime PRN insomnia

## 2021-11-20 NOTE — BH INPATIENT PSYCHIATRY PROGRESS NOTE - NSTXDISORGGOAL_PSY_ALL_CORE
Will verbalize 1 strategy to successfully cope with disturbed thinking
Will demonstrate related thoughts for 5 min in conversation

## 2021-11-20 NOTE — BH INPATIENT PSYCHIATRY PROGRESS NOTE - NSDCCRITERIA_PSY_ALL_CORE
symptom management, safety planning, care coordination

## 2021-11-20 NOTE — BH INPATIENT PSYCHIATRY PROGRESS NOTE - NSBHROSSYSTEMS_PSY_ALL_CORE
Psychiatric

## 2021-11-20 NOTE — BH INPATIENT PSYCHIATRY PROGRESS NOTE - NSTXFALLDATETRGT_PSY_ALL_CORE
08-Nov-2021
10-Nov-2021
27-Oct-2021
22-Nov-2021
08-Nov-2021
22-Nov-2021
27-Oct-2021
10-Nov-2021
01-Dec-2021
08-Nov-2021
22-Nov-2021
01-Dec-2021
27-Oct-2021
22-Nov-2021
27-Oct-2021
01-Dec-2021
27-Oct-2021
22-Nov-2021
01-Dec-2021
27-Oct-2021
08-Nov-2021
08-Nov-2021
27-Oct-2021
27-Oct-2021
10-Nov-2021
22-Nov-2021
27-Oct-2021
27-Oct-2021
08-Nov-2021
27-Oct-2021

## 2021-11-20 NOTE — BH INPATIENT PSYCHIATRY PROGRESS NOTE - NSTXPSYCHODATEEST_PSY_ALL_CORE
13-Oct-2021
14-Oct-2021
14-Oct-2021
13-Oct-2021
14-Oct-2021
14-Oct-2021
13-Oct-2021
14-Oct-2021
13-Oct-2021
13-Oct-2021
14-Oct-2021
13-Oct-2021
13-Oct-2021
14-Oct-2021
13-Oct-2021
14-Oct-2021
13-Oct-2021
14-Oct-2021
13-Oct-2021
14-Oct-2021

## 2021-11-20 NOTE — BH INPATIENT PSYCHIATRY PROGRESS NOTE - NSBHCHARTREVIEWVS_PSY_A_CORE FT
Vital Signs Last 24 Hrs  T(C): 36.4 (11-20-21 @ 08:05), Max: 36.4 (11-20-21 @ 08:05)  T(F): 97.6 (11-20-21 @ 08:05), Max: 97.6 (11-20-21 @ 08:05)  HR: 85 (11-20-21 @ 08:05) (85 - 85)  BP: 126/80 (11-20-21 @ 08:05) (126/80 - 126/80)  BP(mean): --  RR: --  SpO2: --    Orthostatic VS  11-19-21 @ 20:08  Lying BP: --/-- HR: --  Sitting BP: 107/68 HR: 89  Standing BP: --/-- HR: --  Site: --  Mode: --  Orthostatic VS  11-18-21 @ 20:18  Lying BP: --/-- HR: --  Sitting BP: 110/88 HR: 112  Standing BP: --/-- HR: --  Site: --  Mode: --

## 2021-11-20 NOTE — BH INPATIENT PSYCHIATRY PROGRESS NOTE - NSTXDISORGINTERMD_PSY_ALL_CORE
Titrate Prolixin
Titrate meds
Titrate meds
Titrate Prolixin
Titrate meds
Titrate meds
Titrate Prolixin
Titrate meds
Titrate meds
Titrate Prolixin
Titrate meds
Titrate Prolixin
Titrate meds
Titrate Prolixin
Titrate meds
Titrate Prolixin
Titrate Prolixin
Titrate meds
Titrate Prolixin
Titrate Prolixin
Titrate meds
Titrate Prolixin

## 2021-11-20 NOTE — STROKE CODE NOTE - NSSTROKETPAEXCLREL_OTHER_FT
Acute change in mental status in the setting of hypotension, with improvement in mental status noted with improvement in BP.

## 2021-11-20 NOTE — BH INPATIENT PSYCHIATRY PROGRESS NOTE - NSTXPROBDISORG_PSY_ALL_CORE
DISORGANIZATION OF THOUGHT/BEHAVIOR

## 2021-11-20 NOTE — BH INPATIENT PSYCHIATRY PROGRESS NOTE - NSTXDCOTHRPROGRES_PSY_ALL_CORE
Improving
No Change
No Change
Improving
No Change
Improving
No Change
Improving
No Change
Improving
No Change
Improving
Improving
No Change
No Change

## 2021-11-20 NOTE — BH INPATIENT PSYCHIATRY PROGRESS NOTE - NSTXPROBDCOTHR_PSY_ALL_CORE
DISCHARGE ISSUE - OTHER

## 2021-11-20 NOTE — BH INPATIENT PSYCHIATRY PROGRESS NOTE - NSTXFALLDATEEST_PSY_ALL_CORE
13-Oct-2021

## 2021-11-20 NOTE — BH INPATIENT PSYCHIATRY PROGRESS NOTE - NSTXFALLGOAL_PSY_ALL_CORE
Use non-slip footwear when out of bed
Call for assistance before getting out of bed or chair
Use non-slip footwear when out of bed
Use non-slip footwear when out of bed
Call for assistance before getting out of bed or chair
Use non-slip footwear when out of bed
Call for assistance before getting out of bed or chair
Use non-slip footwear when out of bed
Call for assistance before getting out of bed or chair
Use non-slip footwear when out of bed
Call for assistance before getting out of bed or chair
Use non-slip footwear when out of bed
Use non-slip footwear when out of bed
Call for assistance before getting out of bed or chair
Use non-slip footwear when out of bed
Call for assistance before getting out of bed or chair
Call for assistance before getting out of bed or chair
Use non-slip footwear when out of bed
Call for assistance before getting out of bed or chair
Call for assistance before getting out of bed or chair
Use non-slip footwear when out of bed
Use non-slip footwear when out of bed
Call for assistance before getting out of bed or chair

## 2021-11-20 NOTE — BH INPATIENT PSYCHIATRY PROGRESS NOTE - NSTXPROBPSYCHO_PSY_ALL_CORE
PSYCHOTIC SYMPTOMS

## 2021-11-20 NOTE — ED PROVIDER NOTE - CLINICAL SUMMARY MEDICAL DECISION MAKING FREE TEXT BOX
Impression:  acute-onset change in mental status with dilated unresponsive R pupil  Plan:  appropriate code stroke called, within TPA window.  Awaiting Neuro recs.

## 2021-11-20 NOTE — BH INPATIENT PSYCHIATRY PROGRESS NOTE - NSBHCONSULTIPREASON_PSY_A_CORE
danger to others; mental illness expected to respond to inpatient care

## 2021-11-20 NOTE — BH INPATIENT PSYCHIATRY PROGRESS NOTE - NSTXDCOTHRGOAL_PSY_ALL_CORE
Pt demonstrated aggressive behavior.
Pt will refrain from aggressive behavior and comply with medication
Pt. will comply with treatment recommendations within seven days.
Pt demonstrated aggressive behavior.
Pt demonstrated aggressive behavior.
Pt will refrain from aggressive behavior and comply with medication
Pt demonstrated aggressive behavior.
Pt will refrain from aggressive behavior and comply with medication
Pt will refrain from aggressive behavior and comply with medication
Pt demonstrated aggressive behavior.
Pt demonstrated aggressive behavior.
Pt. will comply with treatment recommendations within seven days.
Pt demonstrated aggressive behavior.
Pt will refrain from aggressive behavior and comply with medication
Pt. will comply with treatment recommendations within seven days.
Pt will refrain from aggressive behavior and comply with medication
Pt. will comply with treatment recommendations within seven days.
Pt demonstrated aggressive behavior.
Pt. will comply with treatment recommendations within seven days.
Pt will refrain from aggressive behavior and comply with medication
Pt demonstrated aggressive behavior.
Pt will refrain from aggressive behavior and comply with medication
Pt will refrain from aggressive behavior and comply with medication
Pt demonstrated aggressive behavior.
Pt. will comply with treatment recommendations within seven days.

## 2021-11-21 DIAGNOSIS — Z29.9 ENCOUNTER FOR PROPHYLACTIC MEASURES, UNSPECIFIED: ICD-10-CM

## 2021-11-21 DIAGNOSIS — G40.909 EPILEPSY, UNSPECIFIED, NOT INTRACTABLE, WITHOUT STATUS EPILEPTICUS: ICD-10-CM

## 2021-11-21 DIAGNOSIS — E11.9 TYPE 2 DIABETES MELLITUS WITHOUT COMPLICATIONS: ICD-10-CM

## 2021-11-21 DIAGNOSIS — R41.82 ALTERED MENTAL STATUS, UNSPECIFIED: ICD-10-CM

## 2021-11-21 DIAGNOSIS — G20 PARKINSON'S DISEASE: ICD-10-CM

## 2021-11-21 DIAGNOSIS — E03.9 HYPOTHYROIDISM, UNSPECIFIED: ICD-10-CM

## 2021-11-21 DIAGNOSIS — I10 ESSENTIAL (PRIMARY) HYPERTENSION: ICD-10-CM

## 2021-11-21 DIAGNOSIS — F20.9 SCHIZOPHRENIA, UNSPECIFIED: ICD-10-CM

## 2021-11-21 LAB
ALBUMIN SERPL ELPH-MCNC: 3.7 G/DL — SIGNIFICANT CHANGE UP (ref 3.3–5)
ALBUMIN SERPL ELPH-MCNC: 3.7 G/DL — SIGNIFICANT CHANGE UP (ref 3.3–5)
ALP SERPL-CCNC: 81 U/L — SIGNIFICANT CHANGE UP (ref 40–120)
ALP SERPL-CCNC: 85 U/L — SIGNIFICANT CHANGE UP (ref 40–120)
ALT FLD-CCNC: 13 U/L — SIGNIFICANT CHANGE UP (ref 4–33)
ALT FLD-CCNC: <5 U/L — LOW (ref 4–33)
AMMONIA BLD-MCNC: 46 UMOL/L — SIGNIFICANT CHANGE UP (ref 11–55)
ANION GAP SERPL CALC-SCNC: 10 MMOL/L — SIGNIFICANT CHANGE UP (ref 7–14)
ANION GAP SERPL CALC-SCNC: 12 MMOL/L — SIGNIFICANT CHANGE UP (ref 7–14)
APPEARANCE UR: CLEAR — SIGNIFICANT CHANGE UP
APTT BLD: 38 SEC — HIGH (ref 27–36.3)
AST SERPL-CCNC: 22 U/L — SIGNIFICANT CHANGE UP (ref 4–32)
AST SERPL-CCNC: 27 U/L — SIGNIFICANT CHANGE UP (ref 4–32)
B PERT DNA SPEC QL NAA+PROBE: SIGNIFICANT CHANGE UP
B PERT+PARAPERT DNA PNL SPEC NAA+PROBE: SIGNIFICANT CHANGE UP
BASOPHILS # BLD AUTO: 0.03 K/UL — SIGNIFICANT CHANGE UP (ref 0–0.2)
BASOPHILS NFR BLD AUTO: 0.5 % — SIGNIFICANT CHANGE UP (ref 0–2)
BILIRUB SERPL-MCNC: 0.2 MG/DL — SIGNIFICANT CHANGE UP (ref 0.2–1.2)
BILIRUB SERPL-MCNC: <0.2 MG/DL — SIGNIFICANT CHANGE UP (ref 0.2–1.2)
BILIRUB UR-MCNC: NEGATIVE — SIGNIFICANT CHANGE UP
BLD GP AB SCN SERPL QL: NEGATIVE — SIGNIFICANT CHANGE UP
BLOOD GAS VENOUS COMPREHENSIVE RESULT: SIGNIFICANT CHANGE UP
BORDETELLA PARAPERTUSSIS (RAPRVP): SIGNIFICANT CHANGE UP
BUN SERPL-MCNC: 18 MG/DL — SIGNIFICANT CHANGE UP (ref 7–23)
BUN SERPL-MCNC: 24 MG/DL — HIGH (ref 7–23)
C PNEUM DNA SPEC QL NAA+PROBE: SIGNIFICANT CHANGE UP
CALCIUM SERPL-MCNC: 9.8 MG/DL — SIGNIFICANT CHANGE UP (ref 8.4–10.5)
CALCIUM SERPL-MCNC: 9.9 MG/DL — SIGNIFICANT CHANGE UP (ref 8.4–10.5)
CHLORIDE SERPL-SCNC: 107 MMOL/L — SIGNIFICANT CHANGE UP (ref 98–107)
CHLORIDE SERPL-SCNC: 113 MMOL/L — HIGH (ref 98–107)
CK MB BLD-MCNC: 2.5 % — SIGNIFICANT CHANGE UP (ref 0–2.5)
CK MB CFR SERPL CALC: 3 NG/ML — SIGNIFICANT CHANGE UP
CK SERPL-CCNC: 122 U/L — SIGNIFICANT CHANGE UP (ref 25–170)
CO2 SERPL-SCNC: 24 MMOL/L — SIGNIFICANT CHANGE UP (ref 22–31)
CO2 SERPL-SCNC: 25 MMOL/L — SIGNIFICANT CHANGE UP (ref 22–31)
COLOR SPEC: SIGNIFICANT CHANGE UP
CREAT SERPL-MCNC: 0.83 MG/DL — SIGNIFICANT CHANGE UP (ref 0.5–1.3)
CREAT SERPL-MCNC: 0.98 MG/DL — SIGNIFICANT CHANGE UP (ref 0.5–1.3)
DIFF PNL FLD: NEGATIVE — SIGNIFICANT CHANGE UP
EOSINOPHIL # BLD AUTO: 0.1 K/UL — SIGNIFICANT CHANGE UP (ref 0–0.5)
EOSINOPHIL NFR BLD AUTO: 1.7 % — SIGNIFICANT CHANGE UP (ref 0–6)
FLUAV SUBTYP SPEC NAA+PROBE: SIGNIFICANT CHANGE UP
FLUBV RNA SPEC QL NAA+PROBE: SIGNIFICANT CHANGE UP
FOLATE SERPL-MCNC: 20 NG/ML — HIGH (ref 3.1–17.5)
GLUCOSE SERPL-MCNC: 129 MG/DL — HIGH (ref 70–99)
GLUCOSE SERPL-MCNC: 85 MG/DL — SIGNIFICANT CHANGE UP (ref 70–99)
GLUCOSE UR QL: NEGATIVE — SIGNIFICANT CHANGE UP
HADV DNA SPEC QL NAA+PROBE: SIGNIFICANT CHANGE UP
HCOV 229E RNA SPEC QL NAA+PROBE: SIGNIFICANT CHANGE UP
HCOV HKU1 RNA SPEC QL NAA+PROBE: SIGNIFICANT CHANGE UP
HCOV NL63 RNA SPEC QL NAA+PROBE: SIGNIFICANT CHANGE UP
HCOV OC43 RNA SPEC QL NAA+PROBE: SIGNIFICANT CHANGE UP
HCT VFR BLD CALC: 33.5 % — LOW (ref 34.5–45)
HGB BLD-MCNC: 10.2 G/DL — LOW (ref 11.5–15.5)
HMPV RNA SPEC QL NAA+PROBE: SIGNIFICANT CHANGE UP
HPIV1 RNA SPEC QL NAA+PROBE: SIGNIFICANT CHANGE UP
HPIV2 RNA SPEC QL NAA+PROBE: SIGNIFICANT CHANGE UP
HPIV3 RNA SPEC QL NAA+PROBE: SIGNIFICANT CHANGE UP
HPIV4 RNA SPEC QL NAA+PROBE: SIGNIFICANT CHANGE UP
IANC: 3.67 K/UL — SIGNIFICANT CHANGE UP (ref 1.5–8.5)
IMM GRANULOCYTES NFR BLD AUTO: 0.5 % — SIGNIFICANT CHANGE UP (ref 0–1.5)
INR BLD: 1.39 RATIO — HIGH (ref 0.88–1.16)
KETONES UR-MCNC: NEGATIVE — SIGNIFICANT CHANGE UP
LEUKOCYTE ESTERASE UR-ACNC: NEGATIVE — SIGNIFICANT CHANGE UP
LYMPHOCYTES # BLD AUTO: 1.48 K/UL — SIGNIFICANT CHANGE UP (ref 1–3.3)
LYMPHOCYTES # BLD AUTO: 24.6 % — SIGNIFICANT CHANGE UP (ref 13–44)
M PNEUMO DNA SPEC QL NAA+PROBE: SIGNIFICANT CHANGE UP
MAGNESIUM SERPL-MCNC: 2 MG/DL — SIGNIFICANT CHANGE UP (ref 1.6–2.6)
MCHC RBC-ENTMCNC: 27.1 PG — SIGNIFICANT CHANGE UP (ref 27–34)
MCHC RBC-ENTMCNC: 30.4 GM/DL — LOW (ref 32–36)
MCV RBC AUTO: 89.1 FL — SIGNIFICANT CHANGE UP (ref 80–100)
MONOCYTES # BLD AUTO: 0.7 K/UL — SIGNIFICANT CHANGE UP (ref 0–0.9)
MONOCYTES NFR BLD AUTO: 11.6 % — SIGNIFICANT CHANGE UP (ref 2–14)
NEUTROPHILS # BLD AUTO: 3.67 K/UL — SIGNIFICANT CHANGE UP (ref 1.8–7.4)
NEUTROPHILS NFR BLD AUTO: 61.1 % — SIGNIFICANT CHANGE UP (ref 43–77)
NITRITE UR-MCNC: NEGATIVE — SIGNIFICANT CHANGE UP
NRBC # BLD: 0 /100 WBCS — SIGNIFICANT CHANGE UP
NRBC # FLD: 0 K/UL — SIGNIFICANT CHANGE UP
PCP SPEC-MCNC: SIGNIFICANT CHANGE UP
PH UR: 7 — SIGNIFICANT CHANGE UP (ref 5–8)
PHOSPHATE SERPL-MCNC: 3.3 MG/DL — SIGNIFICANT CHANGE UP (ref 2.5–4.5)
PLATELET # BLD AUTO: 208 K/UL — SIGNIFICANT CHANGE UP (ref 150–400)
POTASSIUM SERPL-MCNC: 4.4 MMOL/L — SIGNIFICANT CHANGE UP (ref 3.5–5.3)
POTASSIUM SERPL-MCNC: 5.2 MMOL/L — SIGNIFICANT CHANGE UP (ref 3.5–5.3)
POTASSIUM SERPL-SCNC: 4.4 MMOL/L — SIGNIFICANT CHANGE UP (ref 3.5–5.3)
POTASSIUM SERPL-SCNC: 5.2 MMOL/L — SIGNIFICANT CHANGE UP (ref 3.5–5.3)
PROT SERPL-MCNC: 7.2 G/DL — SIGNIFICANT CHANGE UP (ref 6–8.3)
PROT SERPL-MCNC: 7.5 G/DL — SIGNIFICANT CHANGE UP (ref 6–8.3)
PROT UR-MCNC: NEGATIVE — SIGNIFICANT CHANGE UP
PROTHROM AB SERPL-ACNC: 15.6 SEC — HIGH (ref 10.6–13.6)
RAPID RVP RESULT: SIGNIFICANT CHANGE UP
RBC # BLD: 3.76 M/UL — LOW (ref 3.8–5.2)
RBC # FLD: 16 % — HIGH (ref 10.3–14.5)
RH IG SCN BLD-IMP: POSITIVE — SIGNIFICANT CHANGE UP
RH IG SCN BLD-IMP: POSITIVE — SIGNIFICANT CHANGE UP
RSV RNA SPEC QL NAA+PROBE: SIGNIFICANT CHANGE UP
RV+EV RNA SPEC QL NAA+PROBE: SIGNIFICANT CHANGE UP
SARS-COV-2 RNA SPEC QL NAA+PROBE: SIGNIFICANT CHANGE UP
SODIUM SERPL-SCNC: 142 MMOL/L — SIGNIFICANT CHANGE UP (ref 135–145)
SODIUM SERPL-SCNC: 149 MMOL/L — HIGH (ref 135–145)
SP GR SPEC: 1.04 — SIGNIFICANT CHANGE UP (ref 1–1.05)
TOXICOLOGY SCREEN, DRUGS OF ABUSE, SERUM RESULT: SIGNIFICANT CHANGE UP
TROPONIN T, HIGH SENSITIVITY RESULT: 11 NG/L — SIGNIFICANT CHANGE UP
TROPONIN T, HIGH SENSITIVITY RESULT: 9 NG/L — SIGNIFICANT CHANGE UP
TSH SERPL-MCNC: 3.14 UIU/ML — SIGNIFICANT CHANGE UP (ref 0.27–4.2)
UROBILINOGEN FLD QL: SIGNIFICANT CHANGE UP
VIT B12 SERPL-MCNC: 875 PG/ML — SIGNIFICANT CHANGE UP (ref 200–900)
WBC # BLD: 6.01 K/UL — SIGNIFICANT CHANGE UP (ref 3.8–10.5)
WBC # FLD AUTO: 6.01 K/UL — SIGNIFICANT CHANGE UP (ref 3.8–10.5)

## 2021-11-21 PROCEDURE — 70496 CT ANGIOGRAPHY HEAD: CPT | Mod: 26,MA

## 2021-11-21 PROCEDURE — 0042T: CPT

## 2021-11-21 PROCEDURE — 99238 HOSP IP/OBS DSCHRG MGMT 30/<: CPT

## 2021-11-21 PROCEDURE — 71045 X-RAY EXAM CHEST 1 VIEW: CPT | Mod: 26

## 2021-11-21 PROCEDURE — 70498 CT ANGIOGRAPHY NECK: CPT | Mod: 26,MA

## 2021-11-21 PROCEDURE — 99223 1ST HOSP IP/OBS HIGH 75: CPT | Mod: GC

## 2021-11-21 RX ORDER — LACTULOSE 10 G/15ML
10 SOLUTION ORAL AT BEDTIME
Refills: 0 | Status: DISCONTINUED | OUTPATIENT
Start: 2021-11-21 | End: 2021-12-07

## 2021-11-21 RX ORDER — LEVOTHYROXINE SODIUM 125 MCG
75 TABLET ORAL DAILY
Refills: 0 | Status: DISCONTINUED | OUTPATIENT
Start: 2021-11-21 | End: 2021-12-07

## 2021-11-21 RX ORDER — SOD,AMMONIUM,POTASSIUM LACTATE
1 CREAM (GRAM) TOPICAL
Refills: 0 | Status: DISCONTINUED | OUTPATIENT
Start: 2021-11-21 | End: 2021-12-07

## 2021-11-21 RX ORDER — BENZTROPINE MESYLATE 1 MG
0.5 TABLET ORAL
Refills: 0 | Status: DISCONTINUED | OUTPATIENT
Start: 2021-11-21 | End: 2021-11-26

## 2021-11-21 RX ORDER — DIVALPROEX SODIUM 500 MG/1
750 TABLET, DELAYED RELEASE ORAL AT BEDTIME
Refills: 0 | Status: DISCONTINUED | OUTPATIENT
Start: 2021-11-21 | End: 2021-12-07

## 2021-11-21 RX ORDER — SOD,AMMONIUM,POTASSIUM LACTATE
1 CREAM (GRAM) TOPICAL
Refills: 0 | Status: DISCONTINUED | OUTPATIENT
Start: 2021-11-21 | End: 2021-11-21

## 2021-11-21 RX ORDER — ENOXAPARIN SODIUM 100 MG/ML
40 INJECTION SUBCUTANEOUS DAILY
Refills: 0 | Status: DISCONTINUED | OUTPATIENT
Start: 2021-11-21 | End: 2021-12-07

## 2021-11-21 RX ORDER — OLANZAPINE 15 MG/1
2.5 TABLET, FILM COATED ORAL ONCE
Refills: 0 | Status: COMPLETED | OUTPATIENT
Start: 2021-11-21 | End: 2021-11-21

## 2021-11-21 RX ORDER — SODIUM CHLORIDE 9 MG/ML
1000 INJECTION, SOLUTION INTRAVENOUS
Refills: 0 | Status: DISCONTINUED | OUTPATIENT
Start: 2021-11-21 | End: 2021-11-22

## 2021-11-21 RX ORDER — SENNA PLUS 8.6 MG/1
1 TABLET ORAL
Refills: 0 | Status: DISCONTINUED | OUTPATIENT
Start: 2021-11-21 | End: 2021-12-07

## 2021-11-21 RX ORDER — OLANZAPINE 15 MG/1
2.5 TABLET, FILM COATED ORAL EVERY 6 HOURS
Refills: 0 | Status: DISCONTINUED | OUTPATIENT
Start: 2021-11-21 | End: 2021-11-22

## 2021-11-21 RX ORDER — CARBIDOPA AND LEVODOPA 25; 100 MG/1; MG/1
1 TABLET ORAL THREE TIMES A DAY
Refills: 0 | Status: DISCONTINUED | OUTPATIENT
Start: 2021-11-21 | End: 2021-12-07

## 2021-11-21 RX ORDER — LISINOPRIL 2.5 MG/1
10 TABLET ORAL DAILY
Refills: 0 | Status: DISCONTINUED | OUTPATIENT
Start: 2021-11-21 | End: 2021-12-07

## 2021-11-21 RX ORDER — METOPROLOL TARTRATE 50 MG
25 TABLET ORAL
Refills: 0 | Status: DISCONTINUED | OUTPATIENT
Start: 2021-11-21 | End: 2021-12-07

## 2021-11-21 RX ADMIN — CARBIDOPA AND LEVODOPA 1 TABLET(S): 25; 100 TABLET ORAL at 23:23

## 2021-11-21 RX ADMIN — Medication 1 APPLICATION(S): at 23:23

## 2021-11-21 RX ADMIN — CARBIDOPA AND LEVODOPA 1 TABLET(S): 25; 100 TABLET ORAL at 14:11

## 2021-11-21 RX ADMIN — SENNA PLUS 1 TABLET(S): 8.6 TABLET ORAL at 17:34

## 2021-11-21 RX ADMIN — OLANZAPINE 2.5 MILLIGRAM(S): 15 TABLET, FILM COATED ORAL at 10:43

## 2021-11-21 RX ADMIN — DIVALPROEX SODIUM 750 MILLIGRAM(S): 500 TABLET, DELAYED RELEASE ORAL at 23:22

## 2021-11-21 RX ADMIN — LACTULOSE 10 GRAM(S): 10 SOLUTION ORAL at 23:22

## 2021-11-21 RX ADMIN — Medication 25 MILLIGRAM(S): at 17:33

## 2021-11-21 RX ADMIN — SODIUM CHLORIDE 50 MILLILITER(S): 9 INJECTION, SOLUTION INTRAVENOUS at 16:34

## 2021-11-21 RX ADMIN — Medication 0.5 MILLIGRAM(S): at 19:16

## 2021-11-21 NOTE — BH INPATIENT PSYCHIATRY DISCHARGE NOTE - NSDCMRMEDTOKEN_GEN_ALL_CORE_FT
ammonium lactate 12% topical cream: 1 application topically 2 times a day  aspirin 81 mg oral tablet, chewable: 1 tab(s) orally once a day  benztropine 0.5 mg oral tablet: 1 tab(s) orally 2 times a day  divalproex sodium 125 mg oral delayed release capsule: 6 cap(s) orally once a day (at bedtime)  docusate sodium 100 mg oral capsule: 1 cap(s) orally 3 times a day  haloperidol 1 mg oral tablet: 7 tab(s) orally   lactulose 10 g/15 mL oral syrup: 15 milliliter(s) orally once a day (at bedtime)  levOCARNitine 330 mg oral tablet: 2 tab(s) orally 3 times a day (with meals)  levothyroxine 75 mcg (0.075 mg) oral tablet: 1 tab(s) orally once a day  lisinopril 10 mg oral tablet: 1 tab(s) orally once a day  metFORMIN 500 mg oral tablet: 1 tab(s) orally   metoprolol tartrate 25 mg oral tablet: 1 tab(s) orally 2 times a day  ocular lubricant ophthalmic solution: 1 drop(s) to each affected eye 2 times a day  senna oral tablet: 1 tab(s) orally 2 times a day   ammonium lactate 12% topical cream: 1 application topically 2 times a day  benztropine 0.5 mg oral tablet: 1 tab(s) orally 2 times a day  divalproex sodium 125 mg oral delayed release capsule: 6 cap(s) orally once a day (at bedtime)  lactulose 10 g/15 mL oral syrup: 15 milliliter(s) orally once a day (at bedtime)  levothyroxine 75 mcg (0.075 mg) oral tablet: 1 tab(s) orally once a day  lisinopril 10 mg oral tablet: 1 tab(s) orally once a day  metFORMIN 500 mg oral tablet: 1 tab(s) orally   metoprolol tartrate 25 mg oral tablet: 1 tab(s) orally 2 times a day  senna oral tablet: 1 tab(s) orally 2 times a day   ammonium lactate 12% topical cream: 1 application topically 2 times a day  carbidopa-levodopa 25 mg-100 mg oral tablet: 1 tab(s) orally 3 times a day  cephalexin 500 mg oral capsule: 1 cap(s) orally 2 times a day   Last dose Dec 23 evening   clotrimazole 1% topical cream: 1 application topically 2 times a day  Cranberry oral capsule: 400 milligram(s) orally 2 times a day  hydrocortisone 1% topical cream: 1 application topically every 12 hours for active hemorrhoids  levothyroxine 75 mcg (0.075 mg) oral tablet: 1 tab(s) orally every 24 hours  lisinopril 10 mg oral tablet: 1 tab(s) orally once a day  metoprolol tartrate 25 mg oral tablet: 1 tab(s) orally 2 times a day  Multiple Vitamins oral tablet: 1 tab(s) orally once a day  OLANZapine 10 mg intramuscular injection: 2.5 milligram(s) intramuscular every 8 hours, As needed, agitation  polyethylene glycol 3350 oral powder for reconstitution: 17 gram(s) orally every 12 hours  senna oral tablet: 2 tab(s) orally once a day (at bedtime)  valACYclovir 1 g oral tablet: 1 tab(s) orally every 12 hours  valproic acid 250 mg oral capsule: 1 cap(s) orally every 8 hours

## 2021-11-21 NOTE — BH INPATIENT PSYCHIATRY DISCHARGE NOTE - NSBHMETABOLIC_PSY_ALL_CORE_FT
BMI: BMI (kg/m2): 25.2 (11-20-21 @ 23:00)  HbA1c: A1C with Estimated Average Glucose Result: 5.6 % (10-15-21 @ 11:00)    Glucose: POCT Blood Glucose.: 116 mg/dL (11-21-21 @ 00:40)    BP: 105/52 (11-20-21 @ 22:35) (80/38 - 127/75)  Lipid Panel: Date/Time: 10-14-21 @ 09:38  Cholesterol, Serum: 159  Direct LDL: --  HDL Cholesterol, Serum: 66  Total Cholesterol/HDL Ration Measurement: --  Triglycerides, Serum: 100

## 2021-11-21 NOTE — ED ADULT NURSE NOTE - CHIEF COMPLAINT QUOTE
Pt brought in by EMS from [place] [id number] with staff member at bedside. As per EMS, staff members called due to pt being hypotensive (80's systolic) and pt was found to be altered around 8pm. . Hx of seizure, parkinson's, schizophrenia. [facility] called to evaluate for stroke. Stroke code called.

## 2021-11-21 NOTE — ED ADULT NURSE REASSESSMENT NOTE - NS ED NURSE REASSESS COMMENT FT1
BREAK RN: Received patient asleep post Versed. Vitals as noted. Pt straight cath as ordered. Reshma aide at bedside. Stretcher in lowest position, wheels locked, appropriate side rails in place, call bell in reach.

## 2021-11-21 NOTE — CONSULT NOTE ADULT - SUBJECTIVE AND OBJECTIVE BOX
**INCOMPLETE**    Neurology Consult    SIMEON BLOOM  70y Female  [id number]      HPI:    **INCOMPLETE**    A 10-system ROS was performed and is negative except as noted above and/or in the HPI.      PAST MEDICAL & SURGICAL HISTORY:  Parkinson disease    Seizure disorder    Hypothyroidism    Type II diabetes mellitus    Hyperlipidemia    Schizophrenia    Hemorrhoids    No significant past surgical history        FAMILY HISTORY:      SOCIAL HISTORY:       MEDICATIONS    Home Medications:  ammonium lactate 12% topical cream: 1 application topically 2 times a day (15 Sami 2018 06:54)  aspirin 81 mg oral tablet, chewable: 1 tab(s) orally once a day (15 Sami 2018 06:54)  benztropine 0.5 mg oral tablet: 1 tab(s) orally 2 times a day (15 Sami 2018 06:54)  divalproex sodium 125 mg oral delayed release capsule: 6 cap(s) orally once a day (at bedtime) (15 Sami 2018 06:54)  docusate sodium 100 mg oral capsule: 1 cap(s) orally 3 times a day (15 Sami 2018 06:54)  haloperidol 1 mg oral tablet: 7 tab(s) orally  (15 Sami 2018 06:54)  lactulose 10 g/15 mL oral syrup: 15 milliliter(s) orally once a day (at bedtime) (15 Sami 2018 06:54)  levOCARNitine 330 mg oral tablet: 2 tab(s) orally 3 times a day (with meals) (15 Sami 2018 06:54)  levothyroxine 75 mcg (0.075 mg) oral tablet: 1 tab(s) orally once a day (15 Sami 2018 06:54)  lisinopril 10 mg oral tablet: 1 tab(s) orally once a day (15 Sami 2018 06:54)  metFORMIN 500 mg oral tablet: 1 tab(s) orally  (15 Sami 2018 06:54)  metoprolol tartrate 25 mg oral tablet: 1 tab(s) orally 2 times a day (15 Sami 2018 06:54)  ocular lubricant ophthalmic solution: 1 drop(s) to each affected eye 2 times a day (15 Sami 2018 06:54)  senna oral tablet: 1 tab(s) orally 2 times a day (15 Sami 2018 06:54)    MEDICATIONS  (STANDING):  midazolam Injectable 5 milliGRAM(s) IntraMuscular Once  midazolam Injectable 5 milliGRAM(s) IntraMuscular Once    MEDICATIONS  (PRN):      Allergies  penicillin (Hives; Rash)        VITALS & EXAMINATION    Height (cm): 157.5 (11-20 @ 23:00)    Vital Signs Last 24 Hrs  T(C): 36.4 (20 Nov 2021 23:00), Max: 36.4 (20 Nov 2021 08:05)  T(F): 97.5 (20 Nov 2021 23:00), Max: 97.6 (20 Nov 2021 08:05)  HR: 69 (20 Nov 2021 23:00) (59 - 85)  BP: 119/89 (20 Nov 2021 23:00) (80/38 - 126/80)  RR: 18 (20 Nov 2021 23:00) (16 - 18)  SpO2: 99% (20 Nov 2021 23:00) (99% - 100%)        LABS:    CBC                       10.2   6.01  )-----------( 208      ( 21 Nov 2021 00:14 )             33.5     Chem       Coags PT/INR - ( 21 Nov 2021 00:14 )   PT: 15.6 sec;   INR: 1.39 ratio         PTT - ( 21 Nov 2021 00:14 )  PTT:38.0 sec  LFTs   Lipid Panel   A1c   Cardiac enzymes     U/A     STUDIES & IMAGING    < from: CT Angio Neck w/ IV Cont (11.21.21 @ 00:01) >  HEAD CT:    Serial axial images were obtained from the skull base to the vertex without the use of intravenous contrast. RAPID artificial intelligence was used for perfusion analysis and for preliminary evaluation of intracranial hemorrhage.    CTA [place]:  After the intravenous power injection of non-ionic contrast material, serial thin sections were obtained through the intracranial circulation on a multislice CT scanner.  Images were reformatted using a dedicated 3D software package and viewed on a dedicated workstation in multiple planes.    CTA NECK:  After the intravenous power injection of non-ionic contrast material, serial thin sections were obtained through the cervical circulation on a multislice CT scanner.  Images were reformatted using a dedicated 3D software package and viewed on a dedicated workstation in multiple planes.  A total 120 cc of Omnipaque 350 were administered intravenously; 0 cc were discarded.    COMPARISON EXAMINATION: Noncontrast head CT performed earlier the same day.    FINDINGS:    VENTRICLES AND SULCI: Stable ventriculomegaly. Symmetric sulcal prominence.  INTRA-AXIAL: No intracranial mass, acute hemorrhage, or midline shift is present. There is non-specific decreased attenuation in the white matter likely microvascular disease.  EXTRA-AXIAL: No extra-axial fluid collection is present.  INTRACRANIAL HEMORRHAGE: None.    VISUALIZED SINUSES: No air-fluid levels are identified.  VISUALIZED MASTOIDS:  Clear  CALVARIUM:  Intact  MISCELLANEOUS:  None.      CT RAPID PERFUSION:  INFARCT CORE: 0 cc  TISSUE AT RISK: 5 cc  MISMATCH RATIO: Infinite        CTA [place]:    ANTERIOR CIRCULATION  ICA  CAVERNOUS, SUPRACLINOID, BIFURCATION SEGMENTS: Patent without flow limiting stenosis.  ANTERIOR CEREBRAL ARTERIES: Bilateral A1, anterior communicating and A2 anterior cerebral arteries are unremarkable in course and caliber without flow limiting stenosis. Hypoplastic right A1 segment.  MIDDLE CEREBRAL ARTERIES: Patent bilateral M1, M2, and distal MCA branches without flow limiting stenosis.  POSTERIOR CIRCULATION:  VERTEBRAL ARTERIES: Patent without flow limiting stenosis  BASILAR ARTERY: Patent no flow limiting stenosis.  POSTERIOR CEREBRAL ARTERIES: Patent without flow limiting stenosis. Fetal origin of the right posterior cerebral artery.    CTA NECK:    GREAT VESSELS: Visualized segments are patent, no flow limiting stenosis. Bovine aortic arch.  COMMON CAROTID ARTERIES:  RIGHT: Patent without flow limiting stenosis  LEFT: Patent without flow limiting stenosis    CAROTID BULBS:  RIGHT: Patent without flow limiting stenosis  LEFT: Patent without flow limiting stenosis    INTERNAL CAROTID ARTERIES:  RIGHT: Patent no evidence for any hemodynamically significant stenosis at the ICA origin by NASCET criteria.  LEFT: Patent no evidence for any hemodynamically significant stenosis at the ICA origin by NASCET criteria.    VERTEBRAL ARTERIES:    RIGHT: Patent no evidence forany flow limiting stenosis.  LEFT: Patent no evidence for any flow limiting stenosis.      SOFT TISSUES: Unremarkable  BONES: Degenerative changes.    IMPRESSION:    HEAD CT AND RAPID PERFUSION:    HEAD CT: Stable ventriculomegaly and chronic microvascular disease. No acute intracranial hemorrhage or mass effect.  CT PERFUSION demonstrated: No core infarct. Small area of Tmax prolongation in the left occipital lobe is likely artifactual.  If symptoms persist consider follow up head CT or MRI, MRA  if no contraindication.  CTA COW:  Patent intracranial circulation without flow limiting stenosis.  CTA NECK: Patent, ECAs, ICAs, no  hemodynamically significant stenosis at  ICA origins by NASCET criteria.  Bilateral vertebral arteries are patent without flow limiting stenosis.  < end of copied text >      < from: CT Brain Stroke Protocol (11.20.21 @ 23:32) >  FINDINGS:    Examination limited by motion/streak artifact.    VENTRICLES AND SULCI:  Redemonstrated ventriculomegaly and sulcal enlargement, not significantly changed from prior exam.  INTRA-AXIAL:  No gross acute hemorrhage or mass effect. Evaluation of the gray-white junction islimited due to motion and streak artifact. Within these limitations, no gross asymmetric loss of gray-white junction is appreciated. Mild patchy periventricular white matter heterogeneity, likely on the basis of chronic small vessel ischemic disease.  EXTRA-AXIAL:  No mass or collection is seen.  VISUALIZED SINUSES:  Clear.  VISUALIZED MASTOIDS:  Clear.  CALVARIUM:  No acute displaced fracture.  ORBITS:  Globes within normal limits. Retrobulbar fat is preserved.    IMPRESSION:  Motion limited examination.  No gross acute intracranial hemorrhage or mass effect.    < end of copied text >       **INCOMPLETE**    Neurology Consult    SIMEON BLOOM  70y Female  [id number]      HPI:  70 y.o. W BIBEMS from [facility] for an acute change in mental status. LNK 21:00. At approximately 22:15, the patient was noted to be lethargic by staff, and not responding to noxious stimuli. Per EMS, patient was noted to have SBP in the 80s at the time. She was placed in tredelenberg with subsequent improvement in her BP and a return to normotension upon ED arrival. Upon evaluation on arrival, the patient was reported to have asymmetric pupils. On evaluation, the patient was noted to have improvement in her mental status, as she was awake, alert, and speaking, but not following all commands. At baseline she is reported to be dysarthric, fully oriented, and able to ambulate independently.    NIHSS: 20 (inflated in the setting of patient not following commands)  Baseline mRS: 3-4  Not a candidate for tPA given AMS in the setting of hypotension, with improvement in mental status noted with improvement in BP.  Not a candidate for MT due to no LVO on imaging.       A 10-system ROS was performed and is negative except as noted above and/or in the HPI.      PAST MEDICAL & SURGICAL HISTORY:  Parkinson disease    Seizure disorder    Hypothyroidism    Type II diabetes mellitus    Hyperlipidemia    Schizophrenia    Hemorrhoids    No significant past surgical history        FAMILY HISTORY:      SOCIAL HISTORY:       MEDICATIONS    Home Medications:  ammonium lactate 12% topical cream: 1 application topically 2 times a day (15 Sami 2018 06:54)  aspirin 81 mg oral tablet, chewable: 1 tab(s) orally once a day (15 Sami 2018 06:54)  benztropine 0.5 mg oral tablet: 1 tab(s) orally 2 times a day (15 Sami 2018 06:54)  divalproex sodium 125 mg oral delayed release capsule: 6 cap(s) orally once a day (at bedtime) (15 Sami 2018 06:54)  docusate sodium 100 mg oral capsule: 1 cap(s) orally 3 times a day (15 Sami 2018 06:54)  haloperidol 1 mg oral tablet: 7 tab(s) orally  (15 Sami 2018 06:54)  lactulose 10 g/15 mL oral syrup: 15 milliliter(s) orally once a day (at bedtime) (15 Sami 2018 06:54)  levOCARNitine 330 mg oral tablet: 2 tab(s) orally 3 times a day (with meals) (15 Sami 2018 06:54)  levothyroxine 75 mcg (0.075 mg) oral tablet: 1 tab(s) orally once a day (15 Sami 2018 06:54)  lisinopril 10 mg oral tablet: 1 tab(s) orally once a day (15 Sami 2018 06:54)  metFORMIN 500 mg oral tablet: 1 tab(s) orally  (15 Sami 2018 06:54)  metoprolol tartrate 25 mg oral tablet: 1 tab(s) orally 2 times a day (15 Sami 2018 06:54)  ocular lubricant ophthalmic solution: 1 drop(s) to each affected eye 2 times a day (15 Sami 2018 06:54)  senna oral tablet: 1 tab(s) orally 2 times a day (15 Sami 2018 06:54)    MEDICATIONS  (STANDING):  midazolam Injectable 5 milliGRAM(s) IntraMuscular Once  midazolam Injectable 5 milliGRAM(s) IntraMuscular Once    MEDICATIONS  (PRN):      Allergies  penicillin (Hives; Rash)        VITALS & EXAMINATION    Height (cm): 157.5 (11-20 @ 23:00)    Vital Signs Last 24 Hrs  T(C): 36.4 (20 Nov 2021 23:00), Max: 36.4 (20 Nov 2021 08:05)  T(F): 97.5 (20 Nov 2021 23:00), Max: 97.6 (20 Nov 2021 08:05)  HR: 69 (20 Nov 2021 23:00) (59 - 85)  BP: 119/89 (20 Nov 2021 23:00) (80/38 - 126/80)  RR: 18 (20 Nov 2021 23:00) (16 - 18)  SpO2: 99% (20 Nov 2021 23:00) (99% - 100%)    **INCOMPLETE**    LABS:    CBC                       10.2   6.01  )-----------( 208      ( 21 Nov 2021 00:14 )             33.5     Chem       Coags PT/INR - ( 21 Nov 2021 00:14 )   PT: 15.6 sec;   INR: 1.39 ratio         PTT - ( 21 Nov 2021 00:14 )  PTT:38.0 sec  LFTs   Lipid Panel   A1c   Cardiac enzymes     U/A     STUDIES & IMAGING    < from: CT Angio Neck w/ IV Cont (11.21.21 @ 00:01) >  HEAD CT:    Serial axial images were obtained from the skull base to the vertex without the use of intravenous contrast. RAPID artificial intelligence was used for perfusion analysis and for preliminary evaluation of intracranial hemorrhage.    CTA [place]:  After the intravenous power injection of non-ionic contrast material, serial thin sections were obtained through the intracranial circulation on a multislice CT scanner.  Images were reformatted using a dedicated 3D software package and viewed on a dedicated workstation in multiple planes.    CTA NECK:  After the intravenous power injection of non-ionic contrast material, serial thin sections were obtained through the cervical circulation on a multislice CT scanner.  Images were reformatted using a dedicated 3D software package and viewed on a dedicated workstation in multiple planes.  A total 120 cc of Omnipaque 350 were administered intravenously; 0 cc were discarded.    COMPARISON EXAMINATION: Noncontrast head CT performed earlier the same day.    FINDINGS:    VENTRICLES AND SULCI: Stable ventriculomegaly. Symmetric sulcal prominence.  INTRA-AXIAL: No intracranial mass, acute hemorrhage, or midline shift is present. There is non-specific decreased attenuation in the white matter likely microvascular disease.  EXTRA-AXIAL: No extra-axial fluid collection is present.  INTRACRANIAL HEMORRHAGE: None.    VISUALIZED SINUSES: No air-fluid levels are identified.  VISUALIZED MASTOIDS:  Clear  CALVARIUM:  Intact  MISCELLANEOUS:  None.      CT RAPID PERFUSION:  INFARCT CORE: 0 cc  TISSUE AT RISK: 5 cc  MISMATCH RATIO: Infinite        CTA [place]:    ANTERIOR CIRCULATION  ICA  CAVERNOUS, SUPRACLINOID, BIFURCATION SEGMENTS: Patent without flow limiting stenosis.  ANTERIOR CEREBRAL ARTERIES: Bilateral A1, anterior communicating and A2 anterior cerebral arteries are unremarkable in course and caliber without flow limiting stenosis. Hypoplastic right A1 segment.  MIDDLE CEREBRAL ARTERIES: Patent bilateral M1, M2, and distal MCA branches without flow limiting stenosis.  POSTERIOR CIRCULATION:  VERTEBRAL ARTERIES: Patent without flow limiting stenosis  BASILAR ARTERY: Patent no flow limiting stenosis.  POSTERIOR CEREBRAL ARTERIES: Patent without flow limiting stenosis. Fetal origin of the right posterior cerebral artery.    CTA NECK:    GREAT VESSELS: Visualized segments are patent, no flow limiting stenosis. Bovine aortic arch.  COMMON CAROTID ARTERIES:  RIGHT: Patent without flow limiting stenosis  LEFT: Patent without flow limiting stenosis    CAROTID BULBS:  RIGHT: Patent without flow limiting stenosis  LEFT: Patent without flow limiting stenosis    INTERNAL CAROTID ARTERIES:  RIGHT: Patent no evidence for any hemodynamically significant stenosis at the ICA origin by NASCET criteria.  LEFT: Patent no evidence for any hemodynamically significant stenosis at the ICA origin by NASCET criteria.    VERTEBRAL ARTERIES:    RIGHT: Patent no evidence forany flow limiting stenosis.  LEFT: Patent no evidence for any flow limiting stenosis.      SOFT TISSUES: Unremarkable  BONES: Degenerative changes.    IMPRESSION:    HEAD CT AND RAPID PERFUSION:    HEAD CT: Stable ventriculomegaly and chronic microvascular disease. No acute intracranial hemorrhage or mass effect.  CT PERFUSION demonstrated: No core infarct. Small area of Tmax prolongation in the left occipital lobe is likely artifactual.  If symptoms persist consider follow up head CT or MRI, MRA  if no contraindication.  CTA COW:  Patent intracranial circulation without flow limiting stenosis.  CTA NECK: Patent, ECAs, ICAs, no  hemodynamically significant stenosis at  ICA origins by NASCET criteria.  Bilateral vertebral arteries are patent without flow limiting stenosis.  < end of copied text >      < from: CT Brain Stroke Protocol (11.20.21 @ 23:32) >  FINDINGS:    Examination limited by motion/streak artifact.    VENTRICLES AND SULCI:  Redemonstrated ventriculomegaly and sulcal enlargement, not significantly changed from prior exam.  INTRA-AXIAL:  No gross acute hemorrhage or mass effect. Evaluation of the gray-white junction islimited due to motion and streak artifact. Within these limitations, no gross asymmetric loss of gray-white junction is appreciated. Mild patchy periventricular white matter heterogeneity, likely on the basis of chronic small vessel ischemic disease.  EXTRA-AXIAL:  No mass or collection is seen.  VISUALIZED SINUSES:  Clear.  VISUALIZED MASTOIDS:  Clear.  CALVARIUM:  No acute displaced fracture.  ORBITS:  Globes within normal limits. Retrobulbar fat is preserved.    IMPRESSION:  Motion limited examination.  No gross acute intracranial hemorrhage or mass effect.    < end of copied text >     **INCOMPLETE**    Neurology Consult    SIMEON BLOOM  70y Female  MRN-[id number]      HPI:  70 y.o. W BIBEMS from [facility] for an acute change in mental status. LNK 21:00. At approximately 22:15, the patient was noted to be lethargic by staff, and not responding to noxious stimuli. Per EMS, patient was noted to have SBP in the 80s at the time. She was placed in tredelenberg with subsequent improvement in her BP and a return to normotension upon ED arrival. Upon evaluation on arrival, the patient was reported to have asymmetric pupils. On evaluation, the patient was noted to have improvement in her mental status, as she was awake, alert, and speaking, but not following all commands. At baseline she is reported to be dysarthric, fully oriented, and able to ambulate independently.    NIHSS: 20 (inflated in the setting of patient not following commands)  Baseline mRS: 3-4  Not a candidate for tPA given AMS in the setting of hypotension, with improvement in mental status noted with improvement in BP.  Not a candidate for MT due to no LVO on imaging.       A 10-system ROS was performed and is negative except as noted above and/or in the HPI.      PAST MEDICAL & SURGICAL HISTORY:  Parkinson disease    Seizure disorder    Hypothyroidism    Type II diabetes mellitus    Hyperlipidemia    Schizophrenia    Hemorrhoids    No significant past surgical history        FAMILY HISTORY:      SOCIAL HISTORY:       MEDICATIONS    Home Medications:  ammonium lactate 12% topical cream: 1 application topically 2 times a day (15 Sami 2018 06:54)  aspirin 81 mg oral tablet, chewable: 1 tab(s) orally once a day (15 Sami 2018 06:54)  benztropine 0.5 mg oral tablet: 1 tab(s) orally 2 times a day (15 Sami 2018 06:54)  divalproex sodium 125 mg oral delayed release capsule: 6 cap(s) orally once a day (at bedtime) (15 Sami 2018 06:54)  docusate sodium 100 mg oral capsule: 1 cap(s) orally 3 times a day (15 Sami 2018 06:54)  haloperidol 1 mg oral tablet: 7 tab(s) orally  (15 Sami 2018 06:54)  lactulose 10 g/15 mL oral syrup: 15 milliliter(s) orally once a day (at bedtime) (15 Sami 2018 06:54)  levOCARNitine 330 mg oral tablet: 2 tab(s) orally 3 times a day (with meals) (15 Sami 2018 06:54)  levothyroxine 75 mcg (0.075 mg) oral tablet: 1 tab(s) orally once a day (15 Sami 2018 06:54)  lisinopril 10 mg oral tablet: 1 tab(s) orally once a day (15 Sami 2018 06:54)  metFORMIN 500 mg oral tablet: 1 tab(s) orally  (15 Sami 2018 06:54)  metoprolol tartrate 25 mg oral tablet: 1 tab(s) orally 2 times a day (15 Sami 2018 06:54)  ocular lubricant ophthalmic solution: 1 drop(s) to each affected eye 2 times a day (15 Sami 2018 06:54)  senna oral tablet: 1 tab(s) orally 2 times a day (15 Sami 2018 06:54)    MEDICATIONS  (STANDING):  midazolam Injectable 5 milliGRAM(s) IntraMuscular Once  midazolam Injectable 5 milliGRAM(s) IntraMuscular Once    MEDICATIONS  (PRN):      Allergies  penicillin (Hives; Rash)        VITALS & EXAMINATION    Height (cm): 157.5 (11-20 @ 23:00)    Vital Signs Last 24 Hrs  T(C): 36.4 (20 Nov 2021 23:00), Max: 36.4 (20 Nov 2021 08:05)  T(F): 97.5 (20 Nov 2021 23:00), Max: 97.6 (20 Nov 2021 08:05)  HR: 69 (20 Nov 2021 23:00) (59 - 85)  BP: 119/89 (20 Nov 2021 23:00) (80/38 - 126/80)  RR: 18 (20 Nov 2021 23:00) (16 - 18)  SpO2: 99% (20 Nov 2021 23:00) (99% - 100%)    **INCOMPLETE**    LABS:    CBC                       10.2   6.01  )-----------( 208      ( 21 Nov 2021 00:14 )             33.5     Chem       Coags PT/INR - ( 21 Nov 2021 00:14 )   PT: 15.6 sec;   INR: 1.39 ratio         PTT - ( 21 Nov 2021 00:14 )  PTT:38.0 sec  LFTs   Lipid Panel   A1c   Cardiac enzymes     U/A     STUDIES & IMAGING    < from: CT Angio Neck w/ IV Cont (11.21.21 @ 00:01) >  HEAD CT:    Serial axial images were obtained from the skull base to the vertex without the use of intravenous contrast. RAPID artificial intelligence was used for perfusion analysis and for preliminary evaluation of intracranial hemorrhage.    CTA [place]:  After the intravenous power injection of non-ionic contrast material, serial thin sections were obtained through the intracranial circulation on a multislice CT scanner.  Images were reformatted using a dedicated 3D software package and viewed on a dedicated workstation in multiple planes.    CTA NECK:  After the intravenous power injection of non-ionic contrast material, serial thin sections were obtained through the cervical circulation on a multislice CT scanner.  Images were reformatted using a dedicated 3D software package and viewed on a dedicated workstation in multiple planes.  A total 120 cc of Omnipaque 350 were administered intravenously; 0 cc were discarded.    COMPARISON EXAMINATION: Noncontrast head CT performed earlier the same day.    FINDINGS:    VENTRICLES AND SULCI: Stable ventriculomegaly. Symmetric sulcal prominence.  INTRA-AXIAL: No intracranial mass, acute hemorrhage, or midline shift is present. There is non-specific decreased attenuation in the white matter likely microvascular disease.  EXTRA-AXIAL: No extra-axial fluid collection is present.  INTRACRANIAL HEMORRHAGE: None.    VISUALIZED SINUSES: No air-fluid levels are identified.  VISUALIZED MASTOIDS:  Clear  CALVARIUM:  Intact  MISCELLANEOUS:  None.      CT RAPID PERFUSION:  INFARCT CORE: 0 cc  TISSUE AT RISK: 5 cc  MISMATCH RATIO: Infinite        CTA [place] OF [place]UE[place]:    ANTERIOR CIRCULATION  ICA  CAVERNOUS, SUPRACLINOID, BIFURCATION SEGMENTS: Patent without flow limiting stenosis.  ANTERIOR CEREBRAL ARTERIES: Bilateral A1, anterior communicating and A2 anterior cerebral arteries are unremarkable in course and caliber without flow limiting stenosis. Hypoplastic right A1 segment.  MIDDLE CEREBRAL ARTERIES: Patent bilateral M1, M2, and distal MCA branches without flow limiting stenosis.  POSTERIOR CIRCULATION:  VERTEBRAL ARTERIES: Patent without flow limiting stenosis  BASILAR ARTERY: Patent no flow limiting stenosis.  POSTERIOR CEREBRAL ARTERIES: Patent without flow limiting stenosis. Fetal origin of the right posterior cerebral artery.    CTA NECK:    GREAT VESSELS: Visualized segments are patent, no flow limiting stenosis. Bovine aortic arch.  COMMON CAROTID ARTERIES:  RIGHT: Patent without flow limiting stenosis  LEFT: Patent without flow limiting stenosis    CAROTID BULBS:  RIGHT: Patent without flow limiting stenosis  LEFT: Patent without flow limiting stenosis    INTERNAL CAROTID ARTERIES:  RIGHT: Patent no evidence for any hemodynamically significant stenosis at the ICA origin by NASCET criteria.  LEFT: Patent no evidence for any hemodynamically significant stenosis at the ICA origin by NASCET criteria.    VERTEBRAL ARTERIES:    RIGHT: Patent no evidence forany flow limiting stenosis.  LEFT: Patent no evidence for any flow limiting stenosis.      SOFT TISSUES: Unremarkable  BONES: Degenerative changes.    IMPRESSION:    HEAD CT AND RAPID PERFUSION:    HEAD CT: Stable ventriculomegaly and chronic microvascular disease. No acute intracranial hemorrhage or mass effect.  CT PERFUSION demonstrated: No core infarct. Small area of Tmax prolongation in the left occipital lobe is likely artifactual.  If symptoms persist consider follow up head CT or MRI, MRA  if no contraindication.  CTA COW:  Patent intracranial circulation without flow limiting stenosis.  CTA NECK: Patent, ECAs, ICAs, no  hemodynamically significant stenosis at  ICA origins by NASCET criteria.  Bilateral vertebral arteries are patent without flow limiting stenosis.  < end of copied text >      < from: CT Brain Stroke Protocol (11.20.21 @ 23:32) >  FINDINGS:    Examination limited by motion/streak artifact.    VENTRICLES AND SULCI:  Redemonstrated ventriculomegaly and sulcal enlargement, not significantly changed from prior exam.  INTRA-AXIAL:  No gross acute hemorrhage or mass effect. Evaluation of the gray-white junction islimited due to motion and streak artifact. Within these limitations, no gross asymmetric loss of gray-white junction is appreciated. Mild patchy periventricular white matter heterogeneity, likely on the basis of chronic small vessel ischemic disease.  EXTRA-AXIAL:  No mass or collection is seen.  VISUALIZED SINUSES:  Clear.  VISUALIZED MASTOIDS:  Clear.  CALVARIUM:  No acute displaced fracture.  ORBITS:  Globes within normal limits. Retrobulbar fat is preserved.    IMPRESSION:  Motion limited examination.  No gross acute intracranial hemorrhage or mass effect.    < end of copied text >   Neurology Consult    SIMEON BLOOM  70y Female  [id number]      HPI:  70 y.o. W BIBEMS from [facility] for an acute change in mental status. LNK 21:00. At approximately 22:15, the patient was noted to be lethargic by staff, and not responding to noxious stimuli. Per EMS, patient was noted to have SBP in the 80s at the time. She was placed in tredelenberg with subsequent improvement in her BP and a return to normotension upon ED arrival. Upon evaluation on arrival, the patient was reported to have asymmetric pupils. On evaluation, the patient was noted to have improvement in her mental status, as she was awake, alert, and speaking, but not following all commands. At baseline she is reported to be dysarthric, fully oriented, and able to ambulate independently.    NIHSS: 20 (inflated in the setting of patient not following commands)  Baseline mRS: 3-4  Not a candidate for tPA given AMS in the setting of hypotension, with improvement in mental status noted with improvement in BP.  Not a candidate for MT due to no LVO on imaging.       A 10-system ROS was performed and is negative except as noted above and/or in the HPI.      PAST MEDICAL & SURGICAL HISTORY:  Parkinson disease    Seizure disorder    Hypothyroidism    Type II diabetes mellitus    Hyperlipidemia    Schizophrenia    Hemorrhoids    No significant past surgical history        FAMILY HISTORY:      SOCIAL HISTORY:       MEDICATIONS    Home Medications:  ammonium lactate 12% topical cream: 1 application topically 2 times a day (15 Sami 2018 06:54)  aspirin 81 mg oral tablet, chewable: 1 tab(s) orally once a day (15 Sami 2018 06:54)  benztropine 0.5 mg oral tablet: 1 tab(s) orally 2 times a day (15 Sami 2018 06:54)  divalproex sodium 125 mg oral delayed release capsule: 6 cap(s) orally once a day (at bedtime) (15 Asmi 2018 06:54)  docusate sodium 100 mg oral capsule: 1 cap(s) orally 3 times a day (15 Sami 2018 06:54)  haloperidol 1 mg oral tablet: 7 tab(s) orally  (15 Sami 2018 06:54)  lactulose 10 g/15 mL oral syrup: 15 milliliter(s) orally once a day (at bedtime) (15 Sami 2018 06:54)  levOCARNitine 330 mg oral tablet: 2 tab(s) orally 3 times a day (with meals) (15 Sami 2018 06:54)  levothyroxine 75 mcg (0.075 mg) oral tablet: 1 tab(s) orally once a day (15 Sami 2018 06:54)  lisinopril 10 mg oral tablet: 1 tab(s) orally once a day (15 Sami 2018 06:54)  metFORMIN 500 mg oral tablet: 1 tab(s) orally  (15 Sami 2018 06:54)  metoprolol tartrate 25 mg oral tablet: 1 tab(s) orally 2 times a day (15 Sami 2018 06:54)  ocular lubricant ophthalmic solution: 1 drop(s) to each affected eye 2 times a day (15 Sami 2018 06:54)  senna oral tablet: 1 tab(s) orally 2 times a day (15 Sami 2018 06:54)    MEDICATIONS  (STANDING):  midazolam Injectable 5 milliGRAM(s) IntraMuscular Once  midazolam Injectable 5 milliGRAM(s) IntraMuscular Once    MEDICATIONS  (PRN):      Allergies  penicillin (Hives; Rash)        VITALS & EXAMINATION    Height (cm): 157.5 (11-20 @ 23:00)    Vital Signs Last 24 Hrs  T(C): 36.4 (20 Nov 2021 23:00), Max: 36.4 (20 Nov 2021 08:05)  T(F): 97.5 (20 Nov 2021 23:00), Max: 97.6 (20 Nov 2021 08:05)  HR: 69 (20 Nov 2021 23:00) (59 - 85)  BP: 119/89 (20 Nov 2021 23:00) (80/38 - 126/80)  RR: 18 (20 Nov 2021 23:00) (16 - 18)  SpO2: 99% (20 Nov 2021 23:00) (99% - 100%)    General: lying in bed  Mental status: eyes open, follows some simple commands  Speech: mild-moderately dysarthric, fluent, with somewhat impaired comprehension  CN's: Pupils: 7 mm OD, nonreactive, 2.5-3 mm, reactive OS. EOMI, no BTT b/l, no facial asymmetry, tongue midline  Motor: spontaneous movement in all extremities  Reflexes: plantar responses flexor, reflexes 2+ throughout  Coordination: did not participate  Sensory: grimaces/withdraws to noxious stimuli throughout  Gait: patient could not participate    LABS:    CBC                       10.2   6.01  )-----------( 208      ( 21 Nov 2021 00:14 )             33.5     Chem       Coags PT/INR - ( 21 Nov 2021 00:14 )   PT: 15.6 sec;   INR: 1.39 ratio         PTT - ( 21 Nov 2021 00:14 )  PTT:38.0 sec  LFTs   Lipid Panel   A1c   Cardiac enzymes     U/A     STUDIES & IMAGING    < from: CT Angio Neck w/ IV Cont (11.21.21 @ 00:01) >  HEAD CT:    Serial axial images were obtained from the skull base to the vertex without the use of intravenous contrast. RAPID artificial intelligence was used for perfusion analysis and for preliminary evaluation of intracranial hemorrhage.    CTA [place] OF DUEÑAS:  After the intravenous power injection of non-ionic contrast material, serial thin sections were obtained through the intracranial circulation on a multislice CT scanner.  Images were reformatted using a dedicated 3D software package and viewed on a dedicated workstation in multiple planes.    CTA NECK:  After the intravenous power injection of non-ionic contrast material, serial thin sections were obtained through the cervical circulation on a multislice CT scanner.  Images were reformatted using a dedicated 3D software package and viewed on a dedicated workstation in multiple planes.  A total 120 cc of Omnipaque 350 were administered intravenously; 0 cc were discarded.    COMPARISON EXAMINATION: Noncontrast head CT performed earlier the same day.    FINDINGS:    VENTRICLES AND SULCI: Stable ventriculomegaly. Symmetric sulcal prominence.  INTRA-AXIAL: No intracranial mass, acute hemorrhage, or midline shift is present. There is non-specific decreased attenuation in the white matter likely microvascular disease.  EXTRA-AXIAL: No extra-axial fluid collection is present.  INTRACRANIAL HEMORRHAGE: None.    VISUALIZED SINUSES: No air-fluid levels are identified.  VISUALIZED MASTOIDS:  Clear  CALVARIUM:  Intact  MISCELLANEOUS:  None.      CT RAPID PERFUSION:  INFARCT CORE: 0 cc  TISSUE AT RISK: 5 cc  MISMATCH RATIO: Infinite        CTA [place] OF DUEÑAS:    ANTERIOR CIRCULATION  ICA  CAVERNOUS, SUPRACLINOID, BIFURCATION SEGMENTS: Patent without flow limiting stenosis.  ANTERIOR CEREBRAL ARTERIES: Bilateral A1, anterior communicating and A2 anterior cerebral arteries are unremarkable in course and caliber without flow limiting stenosis. Hypoplastic right A1 segment.  MIDDLE CEREBRAL ARTERIES: Patent bilateral M1, M2, and distal MCA branches without flow limiting stenosis.  POSTERIOR CIRCULATION:  VERTEBRAL ARTERIES: Patent without flow limiting stenosis  BASILAR ARTERY: Patent no flow limiting stenosis.  POSTERIOR CEREBRAL ARTERIES: Patent without flow limiting stenosis. Fetal origin of the right posterior cerebral artery.    CTA NECK:    GREAT VESSELS: Visualized segments are patent, no flow limiting stenosis. Bovine aortic arch.  COMMON CAROTID ARTERIES:  RIGHT: Patent without flow limiting stenosis  LEFT: Patent without flow limiting stenosis    CAROTID BULBS:  RIGHT: Patent without flow limiting stenosis  LEFT: Patent without flow limiting stenosis    INTERNAL CAROTID ARTERIES:  RIGHT: Patent no evidence for any hemodynamically significant stenosis at the ICA origin by NASCET criteria.  LEFT: Patent no evidence for any hemodynamically significant stenosis at the ICA origin by NASCET criteria.    VERTEBRAL ARTERIES:    RIGHT: Patent no evidence forany flow limiting stenosis.  LEFT: Patent no evidence for any flow limiting stenosis.      SOFT TISSUES: Unremarkable  BONES: Degenerative changes.    IMPRESSION:    HEAD CT AND RAPID PERFUSION:    HEAD CT: Stable ventriculomegaly and chronic microvascular disease. No acute intracranial hemorrhage or mass effect.  CT PERFUSION demonstrated: No core infarct. Small area of Tmax prolongation in the left occipital lobe is likely artifactual.  If symptoms persist consider follow up head CT or MRI, MRA  if no contraindication.  CTA COW:  Patent intracranial circulation without flow limiting stenosis.  CTA NECK: Patent, ECAs, ICAs, no  hemodynamically significant stenosis at  ICA origins by NASCET criteria.  Bilateral vertebral arteries are patent without flow limiting stenosis.  < end of copied text >      < from: CT Brain Stroke Protocol (11.20.21 @ 23:32) >  FINDINGS:    Examination limited by motion/streak artifact.    VENTRICLES AND SULCI:  Redemonstrated ventriculomegaly and sulcal enlargement, not significantly changed from prior exam.  INTRA-AXIAL:  No gross acute hemorrhage or mass effect. Evaluation of the gray-white junction islimited due to motion and streak artifact. Within these limitations, no gross asymmetric loss of gray-white junction is appreciated. Mild patchy periventricular white matter heterogeneity, likely on the basis of chronic small vessel ischemic disease.  EXTRA-AXIAL:  No mass or collection is seen.  VISUALIZED SINUSES:  Clear.  VISUALIZED MASTOIDS:  Clear.  CALVARIUM:  No acute displaced fracture.  ORBITS:  Globes within normal limits. Retrobulbar fat is preserved.    IMPRESSION:  Motion limited examination.  No gross acute intracranial hemorrhage or mass effect.    < end of copied text >

## 2021-11-21 NOTE — BH INPATIENT PSYCHIATRY DISCHARGE NOTE - HPI (INCLUDE ILLNESS QUALITY, SEVERITY, DURATION, TIMING, CONTEXT, MODIFYING FACTORS, ASSOCIATED SIGNS AND SYMPTOMS)
On the unit, patient is very disorganized, tangential and easily agitated. She needs constant redirection and is placed on CO for safety. Patient was unable to participate in interview in a meaningful way. She denied SI and cursed at writer for asking her about suicidal thoughts. Patient would also start having conversation with herself during, actively responding to internal stimuli.      As per [facility] ED Assessment:  "Pt is a 71 y/o AAF, domiciled at [facility], long hx of Schizophrenia with multiple past inpatient hospitalization, last hospitalization as per psyckes 5/2018 at [facility], past hx of aggression towards staff and peers, no documented hx of SA, pertinent PMH includes HLD, HTN, Parkinson's dx. Seizure d/o, T2DM, Hypothyroidism, Glaucoma, PVD, Asthma, Bladder Ca, BIB EMS activated by sister for agitation and worsening hallucination. Pt was medically seen found to have a UTI  exam, she was medically cleared and psych was consulted for psychosis.     Upon assessment pt is easily agitated, very irritable, confrontational and uncooperative. She is a limited historian, not easily engaged with interview mumbling incoherently at times making it difficult to understand. She refused to cooperate with procedures, trying to attack staff (kicking), cursing explicit and threatening to hurt them. Staff tried on multiple attempts to verbally deescalate pt., however after several minutes this became futile and pt needed prns meds to help mitigate her disruptive behaviors. When questioned pt as to the reason why she was so angry, was unable/unwilling to elaborate.     In regards to mood symptoms, pt reports of feeling sad because her sister and niece are trying to steal her money. She was unable to say how she knows this but states she has "thousands of dollars" that she left "on the table", again unable to elaborate. She denies SI but reports HI towards family and staff. She was unable to identify other neurovegetative symptoms. She states that she has been having AH of "the lord is trying to speak with me." When questioned as to what he is saying, states "he wants to get me out of here. " She denies VH, feeling paranoid, or being harm by others.     See  note for collateral from sister. In short sister reports that pt seem more depressed, more altered and not making sense. He appetite has decreased, and not keeping up with her personal hygiene. Pt has been talking to himself and seeing "imaginary people." States pt is not at her baseline and is advocating for inpatient hospitalization."

## 2021-11-21 NOTE — CONSULT NOTE ADULT - ATTENDING COMMENTS
70 y.o. W BIBEMS from [facility] for an acute change in mental status. PE non-focal AAOC x2 mno drift moses CN abnormality Initial labs show hemoglobin of 10.2, urine toxicology positive for benzodiazepines, and mildly elevated PT/INR/aPTT. CTH w/o showed stable ventriculomegaly,. Initial vital signs unrevealing. CTA H/N w/ showed no LVO. CTP negative.    Impression: Acute change in mental status in the setting of hypotension    Low suspicion for an acute cerebrovascular event, likely change in MS 2/2 hypotension  Orthostatics  Can get Routine EEG if no clear cause for hypotension, low yield  Can hold MRI at this point

## 2021-11-21 NOTE — CHART NOTE - NSCHARTNOTEFT_GEN_A_CORE
Chart reviewed on transfer from [facility] after being found unresponsive, being medically admitted for workup. Discussed case with Dr. Estrada from primary team; per discharge med rec from Dr. Chung at [facility], advised restarting Depakote 750mg qhs, Cogentin 0.5mg BID if no contraindication noted by primary team or neurology. If patient agitated and QTc < 500, can use Zyprexa 2.5mg PO/IM q6h PRN per agitation regimen at [facility]. Can also consider restarting Risperdal 1.5mg BID standing if no objections from neurology, or defer to tomorrow if patient not significantly agitated. Would avoid restarting full regimen (trazodone, gabapentin, especially Ativan given deliriogenic properties in setting of change in mental status) at this time, though if QTc > 500, would recommend judicious use of Ativan 0.5mg PO/IM/IV PRNs for agitation given the aforementioned risks. Case to be staffed tomorrow with full assessment from  CL team.

## 2021-11-21 NOTE — BH DISCHARGE NOTE NURSING/SOCIAL WORK/PSYCH REHAB - PATIENT PORTAL LINK FT
You can access the FollowMyHealth Patient Portal offered by [facility] by registering at the following website: [email or web address]. By joining [facility]’s FollowMyHealth portal, you will also be able to view your health information using other applications (apps) compatible with our system.

## 2021-11-21 NOTE — H&P ADULT - NSHPPHYSICALEXAM_GEN_ALL_CORE
ICU Vital Signs Last 24 Hrs  T(C): 36.8 (21 Nov 2021 10:37), Max: 36.8 (21 Nov 2021 10:37)  T(F): 98.2 (21 Nov 2021 10:37), Max: 98.2 (21 Nov 2021 10:37)  HR: 80 (21 Nov 2021 10:37) (59 - 80)  BP: 150/104 (21 Nov 2021 10:37) (80/38 - 150/104)  BP(mean): --  ABP: --  ABP(mean): --  RR: 14 (21 Nov 2021 10:37) (11 - 18)  SpO2: 100% (21 Nov 2021 10:37) (99% - 100%)    Constitutional: NAD  HEENT: NCAT, no conjunctival injection, PERRLA, EOMI, dry oral mucosa, no oropharyngeal exudates  Neck: no JVD, supple, no LAD, no thyromegaly  Chest: normal WOB at rest, CTAB  Heart: RRR, physiologic S1 and S2, no murmurs, no rubs, no gallops, 2+ radial pulses  Abd: nondistended, normoactive bowel sounds, soft, nontender, no rebound, no involuntary guarding  Extremities: no clubbing, warm hands and feet, no edema  Neuro: alert, alert and oriented to time, place and date, day of week. Following commands. Strength 5/5 in all extremities. No ptosis, facial asymmetry.   MSK: spontaneous movement of all 4 extremities, no joint swelling  Psych: Denies SI, AVH. Speech dysarthric, not pressured. occasionally becomes agitated and screams  Skin: no rashes ICU Vital Signs Last 24 Hrs  T(C): 36.8 (21 Nov 2021 10:37), Max: 36.8 (21 Nov 2021 10:37)  T(F): 98.2 (21 Nov 2021 10:37), Max: 98.2 (21 Nov 2021 10:37)  HR: 80 (21 Nov 2021 10:37) (59 - 80)  BP: 150/104 (21 Nov 2021 10:37) (80/38 - 150/104)  BP(mean): --  ABP: --  ABP(mean): --  RR: 14 (21 Nov 2021 10:37) (11 - 18)  SpO2: 100% (21 Nov 2021 10:37) (99% - 100%)    Constitutional: NAD  HEENT: NCAT, no conjunctival injection, PERRLA, EOMI, dry oral mucosa, no oropharyngeal exudates  Neck: no JVD, supple, no LAD, no thyromegaly  Chest: normal WOB at rest, CTAB  Heart: RRR, physiologic S1 and S2, no murmurs, no rubs, no gallops, 2+ radial pulses  Abd: nondistended, normoactive bowel sounds, soft, nontender, no rebound, no involuntary guarding  Extremities: no clubbing, warm hands and feet, no edema, no rigidity  Neuro:  alert and oriented to time, place and date, day of week. Following commands. Strength 5/5 in all extremities. No ptosis, facial asymmetry. Good eye contact  MSK: spontaneous movement of all 4 extremities, no joint swelling  Psych: Denies SI, AVH. Speech dysarthric, not pressured. Occasionally becomes agitated and screams. Disorganized speech, poor insight, perseverating about [employer], her children  Skin: no rashes

## 2021-11-21 NOTE — BH INPATIENT PSYCHIATRY DISCHARGE NOTE - DESCRIPTION
domiciled at [facility], has not lived independent since the 1990s, has a sister who is local but does not see her often

## 2021-11-21 NOTE — BH INPATIENT PSYCHIATRY DISCHARGE NOTE - OTHER PAST PSYCHIATRIC HISTORY (INCLUDE DETAILS REGARDING ONSET, COURSE OF ILLNESS, INPATIENT/OUTPATIENT TREATMENT)
hx of Schizophrenia with multiple past inpatient hospitalization, last hospitalization as per psyckes 5/2018 at [facility], past hx of aggression towards staff and peers, no documented hx of SA,

## 2021-11-21 NOTE — H&P ADULT - HISTORY OF PRESENT ILLNESS
71 yo F SHANE from [facility] for acute change in mental status. States patient is normally AAOx3 and was found at 10:15 pm to be lethargic. Patient LKN 9:15. Brought into ambay, responsive to painful stimuli moving extremities, however, asymmetric pupils were noted and Code stroke was called 69 yo F BIBEMS from [facility] for acute change in mental status. States patient is normally AAOx3 and was found at 10:15 pm to be lethargic. Patient LKN 9:15. Brought into ambay, responsive to painful stimuli moving extremities, however, asymmetric pupils were noted and Code stroke was called    70 y.o. W BIBEMS from [facility] for an acute change in mental status. LNK 21:00. At approximately 22:15, the patient was noted to be lethargic by staff, and not responding to noxious stimuli. Per EMS, patient was noted to have SBP in the 80s at the time. She was placed in tredelenberg with subsequent improvement in her BP and a return to normotension upon ED arrival. Upon evaluation on arrival, the patient was reported to have asymmetric pupils. On evaluation, the patient was noted to have improvement in her mental status, as she was awake, alert, and speaking, but not following all commands. At baseline she is reported to be dysarthric, fully oriented, and able to ambulate independently.    The pt. is a 69 y/o female with a history of schizophrenia and multiple psychiatric inpatient admission admitted due to worsening psychosis (hallucinations) and agitation.  She was found to be unreponsive in bed and was transferred to [facility] for further work up 71 yo F with PMH of HTN, T2DM, Parkinson's disease, glaucoma, schizophrenia BIBEMS from [facility] for acute change in mental status. At approximately 22:15, the patient was noted to be lethargic by staff, and not responding to noxious stimuli. Last known normal 21:00. Per EMS, patient was noted to have SBP in the 80s at the time. She was placed in trendelenberg with subsequent improvement in her BP and a return to normotension upon ED arrival. Upon evaluation on arrival, the patient was responsive to painful stimuli and moving extremities. She was also noted to have asymmetric pupils and a code stroke was called. At baseline she is reported to be dysarthric, AAOx3 and able to ambulate independently. Pt is currently fully oriented. Pt reports she has had blown right pupil "for years." Denies HA, dizziness, weakness, numbness. chest pain, SOB, cough, abdominal pain, dysuria, hematuria, hematochezia, SI, AVH. Endorses chronic constipation. Last BM was 2 days ago.     ED vitals: afebrile, HR 69 /89, RR 18, 99% on RA. Given olanzapine 2.5 mg IM x1 and Versed 5 mg IM x1. WBC wnl. UA negative. CXR clear. CT Head, CTA perfusion and CTA head and neck showed no acute pathology and no significant stenosis. Pt evaluated by neurology in ED and noted to have improvement in her mental status, as she was awake, alert, and speaking, but not following all commands. 69 yo F with PMH of HTN, T2DM, asthma, seizure disorder, Parkinson's disease, glaucoma, schizophrenia BIBEMS from [facility] for acute change in mental status. At approximately 22:15, the patient was noted to be lethargic by staff, and not responding to noxious stimuli. Last known normal 21:00. Per EMS, patient was noted to have SBP in the 80s at the time. She was placed in trendelenberg with subsequent improvement in her BP and a return to normotension upon ED arrival. Upon evaluation on arrival, the patient was responsive to painful stimuli and moving extremities. She was also noted to have asymmetric pupils and a code stroke was called. At baseline she is reported to be dysarthric, AAOx3 and able to ambulate independently. Pt is currently fully oriented. Pt reports she has had blown right pupil "for years." Denies blurry vision, HA, dizziness, weakness, numbness. chest pain, SOB, cough, abdominal pain, dysuria, hematuria, hematochezia, SI, AVH. Endorses chronic constipation. Last BM was 2 days ago.     ED vitals: afebrile, HR 69 /89, RR 18, 99% on RA. Given olanzapine 2.5 mg IM x1 and Versed 5 mg IM x1. WBC wnl. UA negative. CXR clear. CT Head, CTA perfusion and CTA head and neck showed no acute pathology and no significant stenosis. Pt evaluated by neurology in ED and noted to have improvement in her mental status, as she was awake, alert, and speaking, but not following all commands. 71 yo F with PMH of HTN, T2DM, asthma, seizure disorder, Parkinson's disease, glaucoma, schizophrenia BIBEMS from [facility] for acute change in mental status. At approximately 22:15, the patient was noted to be lethargic by staff, and not responding to noxious stimuli. Last known normal 21:00. Per EMS, patient was noted to have SBP in the 80s at the time. She was placed in trendelenberg with subsequent improvement in her BP and a return to normotension upon ED arrival. Upon evaluation on arrival, the patient was responsive to painful stimuli and moving extremities. She was also noted to have asymmetric pupils and a code stroke was called. At baseline she is reported to be dysarthric, AAOx3 and able to ambulate independently. Pt is currently fully oriented. Pt reports she has had blown right pupil "for years." Denies blurry vision, HA, dizziness, weakness, numbness. chest pain, SOB, cough, abdominal pain, dysuria, hematuria, hematochezia, SI, AVH. Endorses chronic constipation. Last BM was 2 days ago.     ED vitals: afebrile, HR 69 /89, RR 18, 99% on RA. Given olanzapine 2.5 mg IM x1 and Versed 5 mg IM x1 for agitation. WBC wnl. UA negative. CXR clear. CT Head, CTA perfusion and CTA head and neck showed no acute pathology and no significant stenosis. Pt evaluated by neurology in ED and noted to have improvement in her mental status, as she was awake, alert, and speaking, but not following all commands.

## 2021-11-21 NOTE — H&P ADULT - PROBLEM SELECTOR PLAN 1
Brought in by EMS for increasing lethargy i/s/o hypotension (SBP in 80s). Vitals stable, afebrile. A&Ox3 at baseline. No leukocytosis, UA negative. CXR clear. CT Head, CT perfusion, CTA head and neck negative for acute pathology and significant stenosis. Lactate, CK wnl. Unclear etiology. Infectious vs metabolic vs medication induced    -no signs or symptoms of infection  -neuro exam normal  -at baseline mental status  -urine tox positive for benzodiazepines (Ativan PTA)  -neuro following: rec MR head without contrast  -MR head w/o contrast   -f/u TSH, B12, folate, ammonia, blood cultures  -passed dysphagia screen in ED, will start pureed diet  -pt reports anisocuria is chronic   -Per psych, holding trazodone, gabapentin, and Ativan given deliriogenic properties in setting of change in mental status Brought in by EMS for increasing lethargy i/s/o hypotension (SBP in 80s). Vitals stable, afebrile. A&Ox3 at baseline. No leukocytosis, UA negative. CXR clear. CT Head, CT perfusion, CTA head and neck negative for acute pathology and significant stenosis. Lactate, ammonia, CK wnl. Unclear etiology. Infectious vs metabolic vs medication induced    -no signs or symptoms of infection  -neuro exam normal  -at baseline mental status  -urine tox positive for benzodiazepines (Ativan PTA)  -neuro following: rec MR head without contrast  -MR head w/o contrast   -f/u TSH, B12, folate, blood cultures  -orthostatic vitals  -passed dysphagia screen in ED, will start pureed diet  -pt reports anisocuria is chronic   -Per psych, holding trazodone, gabapentin, and Ativan given deliriogenic properties in setting of change in mental status

## 2021-11-21 NOTE — H&P ADULT - ASSESSMENT
71 yo F with PMH of HTN, T2DM, asthma, seizure disorder, Parkinson's disease, glaucoma, schizophrenia BIBEMS from [facility] for acute change in mental status. CT head, CTA heach and neck and CT perfusion negative for acute pathology. R/o infectious and metabolic causes of AMS

## 2021-11-21 NOTE — H&P ADULT - PROBLEM SELECTOR PLAN 8
-Diet: passed dysphagia screen, however slow to swallow per RN. will start pureed diet  -VTE ppx: lovenox 40 mg subQ daily  -Dispo: [facility] once medically stabl

## 2021-11-21 NOTE — H&P ADULT - PROBLEM SELECTOR PLAN 2
Transferred from [facility]. Disorganized with disorganized speech. AT baseline mental status. Following commands No SI, AVH    -s/p olanzapine 2.5 mg IM x1 and Versed 5 mg IM x1 in ED for agitation.  -continue depakote 750mg qhs and Cogentin 0.5mg BID  -Per psych, holding trazodone, gabapentin, and Ativan given deliriogenic properties in setting of change in mental status  -Zyprexa 2.5mg PO/IM q6h PRN per agitation   -restart Risperdal 1.5mg BID standing if no objections from neurology

## 2021-11-21 NOTE — CONSULT NOTE ADULT - ASSESSMENT
Impression:   Acute change in mental status in the setting of hypotension of unclear etiology. Ddx includes infectious vs toxic/metabolic etiology.  Isolated anisocoria of uncertain etiology.    Recommendations:   Imaging/Studies  [] CXR  [] EKG  [] MRI brain w/o contrast when able     Labs  [] UA, Utox,, BCx  [] NH3, lactate, CK  [] Folate, B12, TSH    Other  [] NPO until clears dysphagia screen, otherwise swallow eval  [] BG goal <180, avoid hypoglycemia  [] POCT glucose monitoring  [] Consider Ophthalmology consult      Case discussed with telestroke attending, Dr. Rodarte. To be discussed with senior fellow, Dr. Rodarte.      Impression:   Acute change in mental status in the setting of hypotension of unclear etiology. Ddx includes infectious vs toxic/metabolic etiology vs less likely central etiology.  Isolated anisocoria of uncertain etiology.    Recommendations:   Imaging/Studies  [] CXR  [] EKG  [] MRI brain w/o contrast     Labs  [] UA, Utox,, BCx  [] NH3, lactate, CK  [] Folate, B12, TSH    Other  [] NPO until clears dysphagia screen, otherwise swallow eval  [] BG goal <180, avoid hypoglycemia  [] POCT glucose monitoring  [] Consider Ophthalmology consult      Case discussed with telestroke attending, Dr. Rodarte. To be discussed with fellow, Dr. Rodarte.      Impression: Acute change in mental status in the setting of hypotension of unclear etiology. Ddx includes infectious vs toxic/metabolic etiology vs less likely central etiology.  Isolated anisocoria of uncertain etiology, possibly physiological vs secondary to other underlying etiology.    Recommendations:   Imaging/Studies  [] CXR  [] EKG  [] MRI brain w/o contrast     Labs  [] UA, Utox,, BCx  [] NH3, lactate, CK  [] Folate, B12, TSH    Other  [] NPO until clears dysphagia screen, otherwise swallow eval  [] BG goal <180, avoid hypoglycemia  [] POCT glucose monitoring  [] Consider Ophthalmology consult      Case discussed with telestroke attending, Dr. Rodarte. To be discussed with fellow, Dr. Rodarte.      Impression: Acute change in mental status in the setting of hypotension of unclear etiology. Ddx includes infectious vs toxic/metabolic etiology vs less likely central etiology.  Isolated anisocoria of uncertain etiology, possibly physiological vs secondary to other underlying etiology.    Recommendations:   Imaging/Studies  [] CXR  [] EKG  [] MRI brain w/o contrast     Labs  [] UA, Utox,, BCx  [] NH3, lactate, CK  [] Folate, B12, TSH    Other  [] NPO until clears dysphagia screen, otherwise swallow eval  [] BG goal <180, avoid hypoglycemia  [] POCT glucose monitoring  [] Consider Ophthalmology consult      Case discussed with telestroke attending, Dr. Mann. To be discussed with fellow, Dr. Rodarte.      Impression: Acute change in mental status in the setting of hypotension of unclear etiology. Ddx includes infectious vs toxic/metabolic etiology vs less likely central etiology.  Isolated anisocoria of uncertain etiology, possibly physiological vs secondary to other underlying etiology.    Recommendations:   Imaging/Studies  [] CXR  [] EKG  [] MRI brain w/o contrast     Labs  [] UA, Utox,, BCx  [] NH3, lactate, CK  [] Folate, B12, TSH    Other  [] NPO until clears dysphagia screen, otherwise swallow eval  [] BG goal <180, avoid hypoglycemia  [] POCT glucose monitoring  [] Consider Ophthalmology consult      Case discussed with telestroke attending, Dr. Mann. To be discussed with Neurology attending, Dr. Andres.   70 y.o. W BIBEMS from [facility] for an acute change in mental status. LNK 21:00. Initial labs show hemoglobin of 10.2, urine toxicology positive for benzodiazepines, and mildly elevated PT/INR/aPTT. CTH w/o showed stable ventriculomegaly,. Initial vital signs unrevealing. CTA H/N w/ showed no LVO. CTP negative.    Impression: Acute change in mental status in the setting of hypotension of unclear etiology. Ddx includes infectious vs toxic/metabolic etiology vs less likely central etiology.  Isolated anisocoria of uncertain etiology, possibly physiological vs secondary to other underlying etiology.    Recommendations:   Imaging/Studies  [] CXR  [] EKG  [] MRI brain w/o contrast     Labs  [] UA, Utox,, BCx  [] NH3, lactate, CK  [] Folate, B12, TSH    Other  [] NPO until clears dysphagia screen, otherwise swallow eval  [] BG goal <180, avoid hypoglycemia  [] POCT glucose monitoring  [] Consider Ophthalmology consult      Case discussed with telestroke attending, Dr. Mann. To be discussed with Neurology attending, Dr. Andres.   70 y.o. W BIBEMS from [facility] for an acute change in mental status. LNK 21:00. Initial labs show hemoglobin of 10.2, urine toxicology positive for benzodiazepines, and mildly elevated PT/INR/aPTT. CTH w/o showed stable ventriculomegaly,. Initial vital signs unrevealing. CTA H/N w/ showed no LVO. CTP negative.    Impression: Acute change in mental status in the setting of hypotension of unclear etiology. Ddx includes infectious vs toxic/metabolic etiology vs less likely central etiology.  Isolated anisocoria of uncertain etiology, possibly physiological vs secondary to other underlying etiology.    Recommendations:   Imaging/Studies  [] CXR  [] EKG  [] MRI brain w/o contrast     Labs  [] UA, Utox,, BCx  [] NH3, lactate, CK  [] Folate, B12, TSH    Other  [] NPO until clears dysphagia screen, otherwise swallow eval  [] BG goal <180, avoid hypoglycemia  [] POCT glucose monitoring      Case discussed with telestroke attending, Dr. Mann. To be discussed with Neurology attending, Dr. Andres.   70 y.o. W BIBEMS from [facility] for an acute change in mental status. LNK 21:00. Initial labs show hemoglobin of 10.2, urine toxicology positive for benzodiazepines, and mildly elevated PT/INR/aPTT. Physical exam significant for anisocoria with nonreactive pupil OD. CTH w/o showed stable ventriculomegaly,. Initial vital signs unrevealing. CTA H/N w/ showed no LVO. CTP negative.    Impression: Acute change in mental status in the setting of hypotension of unclear etiology. Ddx includes infectious vs toxic/metabolic etiology vs less likely central etiology.  Isolated anisocoria of uncertain etiology, possibly physiological vs secondary to other underlying etiology.    Recommendations:   Imaging/Studies  [] CXR  [] EKG  [] MRI brain w/o contrast     Labs  [] UA, Utox,, BCx  [] NH3, lactate, CK  [] Folate, B12, TSH    Other  [] NPO until clears dysphagia screen, otherwise swallow eval  [] BG goal <180, avoid hypoglycemia  [] POCT glucose monitoring      Case discussed with telestroke attending, Dr. Mann. To be discussed with Neurology attending, Dr. Andres.

## 2021-11-21 NOTE — H&P ADULT - NSHPSOCIALHISTORY_GEN_ALL_CORE
Pt is domiciled at [facility], long hx of Schizophrenia with multiple past inpatient hospitalization and past hx of aggression towards staff and peers

## 2021-11-21 NOTE — BH INPATIENT PSYCHIATRY DISCHARGE NOTE - REASON FOR ADMISSION
The pt. is a 69 y/o female with a history of schizophrenia and multiple psychiatric inpatient admission admitted due to worsening psychosis and agitation.

## 2021-11-21 NOTE — H&P ADULT - ATTENDING COMMENTS
70 year old female with HTN, DM2, asthma, Parkinson disease, seizure disorder, schizophrenia, glaucoma, presented from [facility] for change in mental status. Last known normal reported 2100 yesterday, approximately 2215 last night was noted to be lethargic, less responsive, with SBP in 80s and sent to the Emergency Room. Here, normotensive with improving mental status. Found to have asymmetric pupil (was unclear if acute or chronic, although patient now reports she has had this for years) and code stroke was initiated. No acute findings on CT imaging. Appreciate neurology eval, per neuro low suspicion for acute CVA and suspected AMS in setting of hypotension. Now appears back at baseline mentation with stable vitals. Follow up infectious workup with urine and blood cultures. Appreciate Psychiatry recs regarding schizophrenia management.

## 2021-11-21 NOTE — H&P ADULT - NSICDXPASTMEDICALHX_GEN_ALL_CORE_FT
PAST MEDICAL HISTORY:  Hemorrhoids     Hyperlipidemia     Hypertension     Hypothyroidism     Parkinson disease     Schizophrenia     Seizure disorder     Type II diabetes mellitus

## 2021-11-21 NOTE — BH DISCHARGE NOTE NURSING/SOCIAL WORK/PSYCH REHAB - NSDCPRRECOMMEND_PSY_ALL_CORE
Psychiatric Rehabilitation Staff recommend that patient return to [facility] once medically cleared for continued treatment of psychiatric symptoms and stabilization.

## 2021-11-21 NOTE — BH DISCHARGE NOTE NURSING/SOCIAL WORK/PSYCH REHAB - NSCDUDCCRISIS_PSY_A_CORE
[facility]  1 [phone]) [facility]-WELL ([phone])  Text "WELL" to 16559  Website: www.CamSemi/.[employer] 1 [phone] 601-NPBZ (2076) Website: www.safehorizon.org/.National Suicide Prevention Lifeline 9 (889) 666-9185/.  [employer]  1 (266) LIFENET (794-5506)/.  [facility]  [address] [place] [address] [phone]   Hours:  Monday through Friday from 9 AM to 3 PM/.  [employer] - [facility] Crisis Line  [phone], Option 1

## 2021-11-21 NOTE — ED ADULT NURSE REASSESSMENT NOTE - NS ED NURSE REASSESS COMMENT FT1
Patient awake and arousable at this time. Oriented to self. Able to follow basic commands. Will continue to monitor. [facility] staff left, CO initiated. PCA at bedside.

## 2021-11-21 NOTE — BH DISCHARGE NOTE NURSING/SOCIAL WORK/PSYCH REHAB - NSDCPRGOAL_PSY_ALL_CORE
Patient was medically discharged for further workup of suspected stroke. Due to unanticipated discharge, patient was unable to be engaged in a final discharge session, engaged in safety planning or assessed for SI/HI, AH/VH. Psychiatric Rehabilitation Staff additionally could not obtain a final CGI self-rating or provide a Press Ganey survey. Over the course of the current hospitalization, Psychiatric Rehabilitation Staff and patient discussed level of functioning, addressed concerns surrounding the hospitalization, discharge and engaged in skill development. Patient was partially engaged and partially receptive to treatment. Patient was visible on the unit. Patient required some support with behavioral control in the context of restlessness and disorganization. Patient was intrusive with select peers. Patient was verbal and intermittently cooperative with staff. Patient attended some of the Psychiatric Rehabilitation groups offered. She required some redirection, assistance and support during group discussions and activities due to severity of symptoms. Patient was admitted in the context of worsening paranoia and agitation in her nursing home. Patient was consistently compliant with standing medications, though endorsed minimal improvements in regards to symptoms. Patient continued to endorse disorganization, paranoia, delusional thought content, thought broadcasting, paranoia, mood lability, and restlessness. She endorsed less agitation and endorsed improvements in aggressive behavior.

## 2021-11-21 NOTE — H&P ADULT - PROBLEM SELECTOR PLAN 5
c/w depakote Hgb a1c 5.6 on 10/15/21. On metformin at home    -f/u Hgb A1c  -monitor BG on BMP daily

## 2021-11-21 NOTE — H&P ADULT - PROBLEM SELECTOR PLAN 7
-Diet: passed dysphagia screen, however slow to swallow per RN. will start pureed diet  -VTE ppx: lovenox 40 mg subQ daily  -Dispo: [facility] once medically stabl c/w caridopa/levodopa c/w carbidopa/levodopa

## 2021-11-21 NOTE — BH INPATIENT PSYCHIATRY DISCHARGE NOTE - NSDCCPCAREPLAN_GEN_ALL_CORE_FT
PRINCIPAL DISCHARGE DIAGNOSIS  Diagnosis: Hallucinations  Assessment and Plan of Treatment:       SECONDARY DISCHARGE DIAGNOSES  Diagnosis: Chest tightness  Assessment and Plan of Treatment:

## 2021-11-21 NOTE — H&P ADULT - NSHPREVIEWOFSYSTEMS_GEN_ALL_CORE
REVIEW OF SYSTEMS:  CONSTITUTIONAL: No weakness, fevers or chills  EYES: No visual changes; no sclera icterics, no pain or drainage  ENT:  No vertigo or throat pain   NECK: No pain or stiffness  RESPIRATORY: No cough, wheezing, hemoptysis; No shortness of breath  CARDIOVASCULAR: No chest pain or palpitations  GASTROINTESTINAL: +constipation. No abdominal or epigastric pain. No nausea, vomiting, or hematemesis; No diarrhea. No melena or hematochezia.  GENITOURINARY: No dysuria, frequency or hematuria  NEUROLOGICAL: No numbness or weakness. No headache  SKIN: No itching, rashes  Psych: No anxiety or depression, no AVH

## 2021-11-21 NOTE — BH INPATIENT PSYCHIATRY DISCHARGE NOTE - HOSPITAL COURSE
The pt. was admitted to [facility] inpatient services.  She remained psychotic, loud, hyperreligious and is tenuous control.  She was continued on her medications.  She continued in poor control and preaching to her peers.  She talked constantly and was not sleeping.  She was titrated on abilify and depakote with poor results.  She remained the same.  Abilify was cross titrated with risperdal and neurontin was added.  She continues to be hyperverbal with poor limits but was better able to be directed.  She was less hyperreligious.  She was found to be unreponsive in bed and was transferred to [facility] where she woke up and CT scan of her brain was reported as unremarkable.  She was admitted to [facility] for further workup.

## 2021-11-21 NOTE — ED ADULT NURSE REASSESSMENT NOTE - NS ED NURSE REASSESS COMMENT FT1
Patient arousable but unable to follow commands. Medicated prior to neuro check. MD made aware of mental status changes. Will continue to monitor.

## 2021-11-21 NOTE — H&P ADULT - NSHPLABSRESULTS_GEN_ALL_CORE
10.2   6.01  )-----------( 208      ( 2021 00:14 )             33.5         142  |  107  |  24<H>  ----------------------------<  129<H>  5.2   |  25  |  0.98    Ca    9.9      2021 00:14    TPro  7.2  /  Alb  3.7  /  TBili  <0.2  /  DBili  x   /  AST  27  /  ALT  <5<L>  /  AlkPhos  81        Urinalysis Basic - ( 2021 03:41 )    Color: Light Yellow / Appearance: Clear / S.041 / pH: x  Gluc: x / Ketone: Negative  / Bili: Negative / Urobili: <2 mg/dL   Blood: x / Protein: Negative / Nitrite: Negative   Leuk Esterase: Negative / RBC: x / WBC x   Sq Epi: x / Non Sq Epi: x / Bacteria: x    Urine tox positive for benzodiazepines (on ativan)       CT Head, CTA perfusion, CTA head and neck      IMPRESSION:      HEAD CT AND RAPID PERFUSION:    HEAD CT: Stable ventriculomegaly and chronic microvascular disease. No acute intracranial hemorrhage or mass effect.    CT PERFUSION demonstrated: No core infarct. Small area of Tmax prolongation in the left occipital lobe is likely artifactual.    If symptoms persist consider follow up head CT or MRI, MRA  if no contraindication.    CTA COW:  Patent intracranial circulation without flow limiting stenosis.    CTA NECK: Patent, ECAs, ICAs, no  hemodynamically significant stenosis at  ICA origins by NASCET criteria.  Bilateral vertebral arteries are patent without flow limiting stenosis.      CHEST XR       Impression: Grossly Clear Lungs 10.2   6.01  )-----------( 208      ( 2021 00:14 )             33.5         142  |  107  |  24<H>  ----------------------------<  129<H>  5.2   |  25  |  0.98    Ca    9.9      2021 00:14    TPro  7.2  /  Alb  3.7  /  TBili  <0.2  /  DBili  x   /  AST  27  /  ALT  <5<L>  /  AlkPhos  81      Troponin 9 -->11    Urinalysis Basic - ( 2021 03:41 )    Color: Light Yellow / Appearance: Clear / S.041 / pH: x  Gluc: x / Ketone: Negative  / Bili: Negative / Urobili: <2 mg/dL   Blood: x / Protein: Negative / Nitrite: Negative   Leuk Esterase: Negative / RBC: x / WBC x   Sq Epi: x / Non Sq Epi: x / Bacteria: x    Urine tox positive for benzodiazepines (on ativan)       CT Head, CTA perfusion, CTA head and neck      IMPRESSION:      HEAD CT AND RAPID PERFUSION:    HEAD CT: Stable ventriculomegaly and chronic microvascular disease. No acute intracranial hemorrhage or mass effect.    CT PERFUSION demonstrated: No core infarct. Small area of Tmax prolongation in the left occipital lobe is likely artifactual.    If symptoms persist consider follow up head CT or MRI, MRA  if no contraindication.    CTA COW:  Patent intracranial circulation without flow limiting stenosis.    CTA NECK: Patent, ECAs, ICAs, no  hemodynamically significant stenosis at  ICA origins by NASCET criteria.  Bilateral vertebral arteries are patent without flow limiting stenosis.      CHEST XR       Impression: Grossly Clear Lungs

## 2021-11-21 NOTE — ED PEDIATRIC NURSE NOTE - OBJECTIVE STATEMENT
Facilitator RN: Patient received in CT scan brought in from [facility], Code stroke called. As per Staff, patient had an acute change in mental status, patient A&OX3 at baseline. Patient slurring speech and lethargic, found at 2215. Patient last known normal was at 2115. Patient medicated with Versed for CT scan due to confusion and not staying still for scan. 20g placed to left AC, labs collected and sent. Patient brought to room 10, placed on cardiac monitor and continuous pulse ox. Report given to Primary RN.

## 2021-11-22 LAB
ANION GAP SERPL CALC-SCNC: 10 MMOL/L — SIGNIFICANT CHANGE UP (ref 7–14)
ANION GAP SERPL CALC-SCNC: 9 MMOL/L — SIGNIFICANT CHANGE UP (ref 7–14)
BUN SERPL-MCNC: 13 MG/DL — SIGNIFICANT CHANGE UP (ref 7–23)
BUN SERPL-MCNC: 14 MG/DL — SIGNIFICANT CHANGE UP (ref 7–23)
CALCIUM SERPL-MCNC: 9.6 MG/DL — SIGNIFICANT CHANGE UP (ref 8.4–10.5)
CALCIUM SERPL-MCNC: 9.6 MG/DL — SIGNIFICANT CHANGE UP (ref 8.4–10.5)
CHLORIDE SERPL-SCNC: 108 MMOL/L — HIGH (ref 98–107)
CHLORIDE SERPL-SCNC: 108 MMOL/L — HIGH (ref 98–107)
CO2 SERPL-SCNC: 23 MMOL/L — SIGNIFICANT CHANGE UP (ref 22–31)
CO2 SERPL-SCNC: 25 MMOL/L — SIGNIFICANT CHANGE UP (ref 22–31)
COVID-19 NUCLEOCAPSID GAM AB INTERP: POSITIVE
COVID-19 NUCLEOCAPSID TOTAL GAM ANTIBODY RESULT: 14.7 INDEX — HIGH
COVID-19 SPIKE DOMAIN AB INTERP: POSITIVE
COVID-19 SPIKE DOMAIN ANTIBODY RESULT: >250 U/ML — HIGH
CREAT SERPL-MCNC: 0.74 MG/DL — SIGNIFICANT CHANGE UP (ref 0.5–1.3)
CREAT SERPL-MCNC: 0.82 MG/DL — SIGNIFICANT CHANGE UP (ref 0.5–1.3)
CULTURE RESULTS: NO GROWTH — SIGNIFICANT CHANGE UP
GLUCOSE BLDC GLUCOMTR-MCNC: 99 MG/DL — SIGNIFICANT CHANGE UP (ref 70–99)
GLUCOSE SERPL-MCNC: 104 MG/DL — HIGH (ref 70–99)
GLUCOSE SERPL-MCNC: 108 MG/DL — HIGH (ref 70–99)
HCT VFR BLD CALC: 36.2 % — SIGNIFICANT CHANGE UP (ref 34.5–45)
HCV AB S/CO SERPL IA: 0.31 S/CO — SIGNIFICANT CHANGE UP (ref 0–0.99)
HCV AB SERPL-IMP: SIGNIFICANT CHANGE UP
HGB BLD-MCNC: 11.7 G/DL — SIGNIFICANT CHANGE UP (ref 11.5–15.5)
MAGNESIUM SERPL-MCNC: 1.9 MG/DL — SIGNIFICANT CHANGE UP (ref 1.6–2.6)
MCHC RBC-ENTMCNC: 28.1 PG — SIGNIFICANT CHANGE UP (ref 27–34)
MCHC RBC-ENTMCNC: 32.3 GM/DL — SIGNIFICANT CHANGE UP (ref 32–36)
MCV RBC AUTO: 86.8 FL — SIGNIFICANT CHANGE UP (ref 80–100)
NRBC # BLD: 0 /100 WBCS — SIGNIFICANT CHANGE UP
NRBC # FLD: 0 K/UL — SIGNIFICANT CHANGE UP
PHOSPHATE SERPL-MCNC: 3.2 MG/DL — SIGNIFICANT CHANGE UP (ref 2.5–4.5)
PLATELET # BLD AUTO: 243 K/UL — SIGNIFICANT CHANGE UP (ref 150–400)
POTASSIUM SERPL-MCNC: 3.7 MMOL/L — SIGNIFICANT CHANGE UP (ref 3.5–5.3)
POTASSIUM SERPL-MCNC: 4 MMOL/L — SIGNIFICANT CHANGE UP (ref 3.5–5.3)
POTASSIUM SERPL-SCNC: 3.7 MMOL/L — SIGNIFICANT CHANGE UP (ref 3.5–5.3)
POTASSIUM SERPL-SCNC: 4 MMOL/L — SIGNIFICANT CHANGE UP (ref 3.5–5.3)
RBC # BLD: 4.17 M/UL — SIGNIFICANT CHANGE UP (ref 3.8–5.2)
RBC # FLD: 15.9 % — HIGH (ref 10.3–14.5)
SARS-COV-2 IGG+IGM SERPL QL IA: 14.7 INDEX — HIGH
SARS-COV-2 IGG+IGM SERPL QL IA: >250 U/ML — HIGH
SARS-COV-2 IGG+IGM SERPL QL IA: POSITIVE
SARS-COV-2 IGG+IGM SERPL QL IA: POSITIVE
SODIUM SERPL-SCNC: 141 MMOL/L — SIGNIFICANT CHANGE UP (ref 135–145)
SODIUM SERPL-SCNC: 142 MMOL/L — SIGNIFICANT CHANGE UP (ref 135–145)
SPECIMEN SOURCE: SIGNIFICANT CHANGE UP
WBC # BLD: 7.9 K/UL — SIGNIFICANT CHANGE UP (ref 3.8–10.5)
WBC # FLD AUTO: 7.9 K/UL — SIGNIFICANT CHANGE UP (ref 3.8–10.5)

## 2021-11-22 PROCEDURE — 90792 PSYCH DIAG EVAL W/MED SRVCS: CPT

## 2021-11-22 PROCEDURE — 99233 SBSQ HOSP IP/OBS HIGH 50: CPT | Mod: GC

## 2021-11-22 RX ORDER — DIBUCAINE 1 %
1 OINTMENT (GRAM) RECTAL EVERY 6 HOURS
Refills: 0 | Status: DISCONTINUED | OUTPATIENT
Start: 2021-11-22 | End: 2021-11-23

## 2021-11-22 RX ORDER — OLANZAPINE 15 MG/1
2.5 TABLET, FILM COATED ORAL ONCE
Refills: 0 | Status: COMPLETED | OUTPATIENT
Start: 2021-11-22 | End: 2021-11-22

## 2021-11-22 RX ORDER — INFLUENZA VIRUS VACCINE 15; 15; 15; 15 UG/.5ML; UG/.5ML; UG/.5ML; UG/.5ML
0.7 SUSPENSION INTRAMUSCULAR ONCE
Refills: 0 | Status: DISCONTINUED | OUTPATIENT
Start: 2021-11-22 | End: 2021-12-07

## 2021-11-22 RX ORDER — PHENYLEPHRINE-SHARK LIVER OIL-MINERAL OIL-PETROLATUM RECTAL OINTMENT
1 OINTMENT (GRAM) RECTAL EVERY 6 HOURS
Refills: 0 | Status: DISCONTINUED | OUTPATIENT
Start: 2021-11-22 | End: 2021-11-22

## 2021-11-22 RX ORDER — RISPERIDONE 4 MG/1
1.5 TABLET ORAL
Refills: 0 | Status: DISCONTINUED | OUTPATIENT
Start: 2021-11-22 | End: 2021-12-01

## 2021-11-22 RX ADMIN — Medication 25 MILLIGRAM(S): at 17:41

## 2021-11-22 RX ADMIN — OLANZAPINE 2.5 MILLIGRAM(S): 15 TABLET, FILM COATED ORAL at 12:36

## 2021-11-22 RX ADMIN — Medication 0.5 MILLIGRAM(S): at 17:41

## 2021-11-22 RX ADMIN — SENNA PLUS 1 TABLET(S): 8.6 TABLET ORAL at 07:18

## 2021-11-22 RX ADMIN — Medication 1 APPLICATION(S): at 17:45

## 2021-11-22 RX ADMIN — SENNA PLUS 1 TABLET(S): 8.6 TABLET ORAL at 17:41

## 2021-11-22 RX ADMIN — LISINOPRIL 10 MILLIGRAM(S): 2.5 TABLET ORAL at 07:13

## 2021-11-22 RX ADMIN — CARBIDOPA AND LEVODOPA 1 TABLET(S): 25; 100 TABLET ORAL at 07:09

## 2021-11-22 RX ADMIN — Medication 1 APPLICATION(S): at 21:40

## 2021-11-22 RX ADMIN — DIVALPROEX SODIUM 750 MILLIGRAM(S): 500 TABLET, DELAYED RELEASE ORAL at 21:37

## 2021-11-22 RX ADMIN — Medication 1 MILLIGRAM(S): at 17:41

## 2021-11-22 RX ADMIN — Medication 75 MICROGRAM(S): at 07:13

## 2021-11-22 RX ADMIN — LACTULOSE 10 GRAM(S): 10 SOLUTION ORAL at 21:43

## 2021-11-22 RX ADMIN — Medication 25 MILLIGRAM(S): at 07:09

## 2021-11-22 RX ADMIN — RISPERIDONE 1.5 MILLIGRAM(S): 4 TABLET ORAL at 18:33

## 2021-11-22 RX ADMIN — CARBIDOPA AND LEVODOPA 1 TABLET(S): 25; 100 TABLET ORAL at 15:39

## 2021-11-22 RX ADMIN — Medication 0.5 MILLIGRAM(S): at 07:11

## 2021-11-22 RX ADMIN — Medication 1 APPLICATION(S): at 07:13

## 2021-11-22 RX ADMIN — CARBIDOPA AND LEVODOPA 1 TABLET(S): 25; 100 TABLET ORAL at 21:37

## 2021-11-22 RX ADMIN — OLANZAPINE 2.5 MILLIGRAM(S): 15 TABLET, FILM COATED ORAL at 11:04

## 2021-11-22 NOTE — BH CONSULTATION LIAISON ASSESSMENT NOTE - VIOLENCE RISK FACTORS:
Affective dysregulation/Impulsivity/Lack of insight into violence risk/need for treatment/Irritability

## 2021-11-22 NOTE — BH CONSULTATION LIAISON ASSESSMENT NOTE - NSBHCHARTREVIEWLAB_PSY_A_CORE FT
11.7   7.90  )-----------( 243      ( 22 Nov 2021 08:33 )             36.2     Basic Metabolic Panel in AM (11.22.21 @ 08:33)   Sodium, Serum: 141 mmol/L   Potassium, Serum: 4.0 mmol/L   Chloride, Serum: 108 mmol/L   Carbon Dioxide, Serum: 23 mmol/L   Anion Gap, Serum: 10 mmol/L   Blood Urea Nitrogen, Serum: 13 mg/dL   Creatinine, Serum: 0.74 mg/dL   Glucose, Serum: 104 mg/dL   Calcium, Total Serum: 9.6 mg/dL   eGFR if Non : 83

## 2021-11-22 NOTE — PROGRESS NOTE ADULT - PROBLEM SELECTOR PLAN 1
Brought in by EMS for increasing lethargy i/s/o hypotension (SBP in 80s). Vitals stable, afebrile. A&Ox3 at baseline. No leukocytosis, UA negative. CXR clear. CT Head, CT perfusion, CTA head and neck negative for acute pathology and significant stenosis. Lactate, ammonia, CK wnl. Unclear etiology. Infectious vs metabolic vs medication induced    -no signs or symptoms of infection  -neuro exam normal  -at baseline mental status  -urine tox positive for benzodiazepines (Ativan PTA)  -neuro following: rec MR head without contrast  -MR head w/o contrast   -f/u TSH, B12, folate, blood cultures  -orthostatic vitals  -passed dysphagia screen in ED, will start pureed diet  -pt reports anisocuria is chronic   -Per psych, holding trazodone, gabapentin, and Ativan given deliriogenic properties in setting of change in mental status Brought in by EMS for increasing lethargy i/s/o hypotension (SBP in 80s). Vitals stable, afebrile. A&Ox3 at baseline. No leukocytosis, UA negative. CXR clear. CT Head, CT perfusion, CTA head and neck negative for acute pathology and significant stenosis. Lactate, ammonia, CK wnl. Unclear etiology. Likely medication induced    -no signs or symptoms of infection  -neuro exam normal  -at baseline mental status  -urine tox positive for benzodiazepines (Ativan PTA)  -neuro following: rec MR head without contrast  -EKG NSR without signs of ischemia  -TSH, B12, folate wnl  -f/u orthostatic vitals  -passed dysphagia screen in ED, will start pureed diet  -pt reports anisocuria is chronic   -per psych rec, will resume risperidal and Ativan 1 mg PO BID  -Continue to hold trazodone and gabapentin given deliriogenic properties in setting of change in mental status Brought in by EMS for increasing lethargy i/s/o hypotension (SBP in 80s). Vitals stable, afebrile. A&Ox3 at baseline. No leukocytosis, UCx and BCx negative. CXR clear. CT Head, CT perfusion, CTA head and neck negative for acute pathology and significant stenosis. Lactate, ammonia, CK wnl. Unclear etiology. Likely medication induced    -no signs or symptoms of infection  -neuro exam normal  -at baseline mental status  -urine tox positive for benzodiazepines (Ativan PTA)  -neuro following: rec MR head without contrast  -EKG NSR without signs of ischemia  -TSH, B12, folate wnl  -f/u orthostatic vitals  -passed dysphagia screen in ED, will start pureed diet  -pt reports anisocuria is chronic   -per psych rec, will resume risperidal and Ativan 1 mg PO BID  -Continue to hold trazodone and gabapentin given deliriogenic properties in setting of change in mental status

## 2021-11-22 NOTE — BH CONSULTATION LIAISON ASSESSMENT NOTE - NSSUICPROTFACT_PSY_ALL_CORE
Supportive social network of family or friends/Yazidism beliefs Identifies reasons for living/Supportive social network of family or friends/Cultural, spiritual and/or moral attitudes against suicide/Zoroastrian beliefs

## 2021-11-22 NOTE — BH CONSULTATION LIAISON ASSESSMENT NOTE - OTHER
Parkinsons reluctantly cooperative, often requiring redirection to remain cooperative transferred from [facility] has h/o Parkinson's suspect delusions

## 2021-11-22 NOTE — BH CONSULTATION LIAISON ASSESSMENT NOTE - NSBHMSETHTPROC_PSY_A_CORE
Disorganized/Tangential/Thought blocking/Impaired reasoning Disorganized/Tangential/Thought blocking/Illogical/Impaired reasoning

## 2021-11-22 NOTE — BH CONSULTATION LIAISON ASSESSMENT NOTE - NSBHCHARTREVIEWINVESTIGATE_PSY_A_CORE FT
`< from: Xray Chest 1 View AP/PA (11.21.21 @ 00:46) >    IMPRESSION:    Grossly clear lungs.    < end of copied text >      < from: CT Angio Neck w/ IV Cont (11.21.21 @ 00:01) >    IMPRESSION:      HEAD CT AND RAPID PERFUSION:    HEAD CT: Stable ventriculomegaly and chronic microvascular disease. No acute intracranial hemorrhage or mass effect.    CT PERFUSION demonstrated: No core infarct. Small area of Tmax prolongation in the left occipital lobe is likely artifactual.    If symptoms persist consider follow up head CT or MRI, MRA  if no contraindication.    CTA COW:  Patent intracranial circulation without flow limiting stenosis.    CTA NECK: Patent, ECAs, ICAs, no  hemodynamically significant stenosis at  ICA origins by NASCET criteria.  Bilateral vertebral arteries are patent without flow limiting stenosis.    --- End of Report ---    < end of copied text >

## 2021-11-22 NOTE — PROGRESS NOTE ADULT - ASSESSMENT
71 yo F with PMH of HTN, T2DM, asthma, seizure disorder, Parkinson's disease, glaucoma, schizophrenia BIBEMS from [facility] for acute change in mental status. CT head, CTA heach and neck and CT perfusion negative for acute pathology. R/o infectious and metabolic causes of AMS

## 2021-11-22 NOTE — BH CONSULTATION LIAISON ASSESSMENT NOTE - HPI (INCLUDE ILLNESS QUALITY, SEVERITY, DURATION, TIMING, CONTEXT, MODIFYING FACTORS, ASSOCIATED SIGNS AND SYMPTOMS)
Pt is a 69 y/o AAF, domiciled at [facility], long hx of Schizophrenia with multiple past inpatient hospitalizations, last hospitalization started at [facility] 11/12, past hx of aggression towards staff and peers, no documented hx of SA, pertinent PMH includes HLD, HTN, Parkinson's dx. Seizure d/o, T2DM, Hypothyroidism, Glaucoma, PVD, Asthma, Bladder Ca. Pt was found unconscious on the floor at [facility] on 11/20, BIBEMS from [facility] for acute change in mental status.     At [facility], patient medications were titrated for worsening psychosis and agitation. Med rx prior to transfer to ED : Risperidone 1.5mg BID, Benztropine 0.5mg BID, diVALproex Sprinkle 750 milliGRAM(s) Oral at bedtime, gabapentin 400mg QD, lorazepam 1mg TID PO      As per ED assessment:  Patient was responsive to painful stimuli and moving extremities. She was noted to have asymmetric pupils and a code stroke was called. At baseline she is reported to be dysarthric, AAOx3 and able to ambulate independently. Pt was fully oriented. Pt reports she has had blown right pupil "for years." Denies blurry vision, HA, dizziness, weakness, numbness. chest pain. Given olanzapine 2.5 mg IM x1 and Versed 5 mg IM x1 for agitation. Pt evaluated by neurology in ED and noted to have improvement in her mental status, as she was awake, alert, and speaking, but not following all commands. CT head, CTA head and neck and CT perfusion on admission are negative for acute pathology. All psychiatric medications were d/c on admission. Received PRN olanzapine 1.5mg IMx2 for agitation.    On admission:  Pt is A&Ox3, reluctant to disclose much information to the team. She states her mood is "alright" however notes she is "dealing with things" but does not want to elaborate. When asked if these "things" make her sad, she says they make her quite sad starting within the last week. Denies SI, NSSIB and history of SA/SI/NSSIB. She is religiously preoccupied, frequently referring to god stating that god will solve all her issues which is why she does not feel the need to share her thoughts with the team. Evidence of though blocking, patient is also easily distractible, difficult to maintain attention. She is internally preoccupied as well, endorses VH (unable to get clear answer on AH), and + ideas of reference. Questions are not answered directly, with vague answers and loose connections. Pt is a poor historian, unable to gain a complete history. Pt reports being close with her family, naming her sister, aunt and uncle primarily. However, she does state she has thoughts of hurting others and brings up her family in this context, unclear what the connection is.  No rigidity noted on exam. Pt has a baseline mild tremor related to Parkinsons.  Pt is a 71 y/o AAF, domiciled at [facility], long hx of Schizophrenia with multiple past inpatient hospitalizations, last hospitalization started at [facility] 11/12, past hx of aggression towards staff and peers, no documented hx of SA, pertinent PMH includes HLD, HTN, Parkinson's dx. Seizure d/o, T2DM, Hypothyroidism, Glaucoma, PVD, Asthma, Bladder Ca. Pt was found unconscious on the floor at [facility] on 11/20, BIBEMS from [facility] for acute change in mental status.     At [facility], patient medications were titrated for worsening psychosis and agitation. Med rx prior to transfer to ED : Risperidone 1.5mg BID, Benztropine 0.5mg BID, diVALproex Sprinkle 750 milliGRAM(s) Oral at bedtime, gabapentin 400mg QD, lorazepam 1mg TID PO      As per ED assessment:  Patient was responsive to painful stimuli and moving extremities. She was noted to have asymmetric pupils and a code stroke was called. At baseline she is reported to be dysarthric, AAOx3 and able to ambulate independently. Pt was fully oriented. Pt reports she has had blown right pupil "for years." Denies blurry vision, HA, dizziness, weakness, numbness. chest pain. Given olanzapine 2.5 mg IM x1 and Versed 5 mg IM x1 for agitation. Pt evaluated by neurology in ED and noted to have improvement in her mental status, as she was awake, alert, and speaking, but not following all commands. CT head, CTA head and neck and CT perfusion on admission are negative for acute pathology. All psychiatric medications were d/c on admission. Received PRN olanzapine 1.5mg IMx2 for agitation.    On admission:  Pt is A&Ox3, reluctant to disclose much information to the team; reluctantly cooperative and requires reassurance. She states her mood is "alright" however notes she is "dealing with things" but does not want to elaborate. When asked if these "things" make her sad, she says they make her quite sad starting within the last week. Denies SI, NSSIB and history of SA/SI/NSSIB. She is religiously preoccupied, frequently referring to god stating that god will solve all her issues which is why she does not feel the need to share her thoughts with the team. Evidence of thought blocking, patient is also easily distractible, difficult to maintain attention. She is internally preoccupied as well, endorses VH (unable to get clear answer on AH), and + ideas of reference. Questions are not answered directly, with vague answers and loose connections. Pt is a poor historian, unable to gain a complete history. Pt reports being close with her family, naming her sister, aunt and uncle primarily. However, she does state she has thoughts of hurting others and brings up her family in this context, unclear what the connection is.  No rigidity noted on exam. Pt has a baseline mild tremor related to Parkinsons.  Pt is a 69 y/o AAF, domiciled at [facility], long hx of Schizophrenia with multiple past inpatient hospitalizations, last hospitalization started at [facility] 11/12, past hx of aggression towards staff and peers, no documented hx of SA, pertinent PMH includes HLD, HTN, Parkinson's dx. Seizure d/o, T2DM, Hypothyroidism, Glaucoma, PVD, Asthma, Bladder Ca. Pt was found unconscious on the floor at [facility] on 11/20, BIBEMS from [facility] for acute change in mental status.     At [facility], patient medications were titrated for worsening psychosis and agitation. Med rx prior to transfer to ED : Risperidone 1.5mg BID, Benztropine 0.5mg BID, diVALproex Sprinkle 750 milliGRAM(s) Oral at bedtime, gabapentin 400mg QD, lorazepam 1mg TID PO      As per ED assessment:  Patient was responsive to painful stimuli and moving extremities. She was noted to have asymmetric pupils and a code stroke was called. At baseline she is reported to be dysarthric, AAOx3 and able to ambulate independently. Pt was fully oriented. Pt reports she has had blown right pupil "for years." Denies blurry vision, HA, dizziness, weakness, numbness. chest pain. Given olanzapine 2.5 mg IM x1 and Versed 5 mg IM x1 for agitation. Pt evaluated by neurology in ED and noted to have improvement in her mental status, as she was awake, alert, and speaking, but not following all commands. CT head, CTA head and neck and CT perfusion on admission are negative for acute pathology. All psychiatric medications were d/c on admission.     On admission:  Pt is A&Ox2, reluctant to disclose much information to the team; reluctantly cooperative and requires reassurance. She states her mood is "alright" however notes she is "dealing with things" but does not want to elaborate. When asked if these "things" make her sad, she says they make her quite sad starting within the last week. Denies SI, NSSIB and history of SA/SI/NSSIB. She is religiously preoccupied, frequently referring to god stating that god will solve all her issues which is why she does not feel the need to share her thoughts with the team. Evidence of thought blocking, patient is also easily distractible, difficult to maintain attention. She is internally preoccupied as well, endorses VH (unable to get clear answer on AH), and + ideas of reference. Questions are not answered directly, with vague answers and loose connections. Pt is a poor historian, unable to gain a complete history. Pt reports being close with her family, naming her sister, aunt and uncle primarily. However, she does state she has thoughts of hurting others and brings up her family in this context, unclear what the connection is.  No rigidity noted on exam. Pt has a baseline mild tremor related to Parkinsons.  Pt is a 69 y/o AAF, domiciled at [facility], long hx of Schizophrenia with multiple past inpatient hospitalizations, last hospitalization started at [facility] 10/13, past hx of aggression towards staff and peers, no documented hx of SA, pertinent PMH includes HLD, HTN, Parkinson's dx. Seizure d/o, T2DM, Hypothyroidism, Glaucoma, PVD, Asthma, Bladder Ca. Pt was found unconscious on the floor at [facility] on 11/20, BIBEMS from [facility] for acute change in mental status.     At [facility], patient medications were titrated for worsening psychosis and agitation. Med rx prior to transfer to ED (confirmed with Dr. Chung):   Risperidone 1.5mg BID, Benztropine 0.5mg BID, diVALproex Sprinkle 750 milliGRAM(s) Oral at bedtime, gabapentin 400mg QD, lorazepam 1mg TID PO    Pt is A&Ox2, reluctant to disclose much information to the team; reluctantly cooperative and requires reassurance. She states her mood is "alright" however notes she is "dealing with things" but does not want to elaborate. When asked if these "things" make her sad, she says they make her quite sad starting within the last week. Denies SI, NSSIB and history of SA/SI/NSSIB. She is religiously preoccupied, frequently referring to god stating that god will solve all her issues which is why she does not feel the need to share her thoughts with the team. Evidence of thought blocking, patient is also easily distractible, difficult to maintain attention. She is internally preoccupied as well, endorses VH (unable to get clear answer on AH), and + ideas of reference. Questions are not answered directly, with vague answers and loose connections. Pt is a poor historian, unable to gain a complete history. Pt reports being close with her family, naming her sister, aunt and uncle primarily. She denies SI.   No rigidity noted on exam. Pt has a mild tremor possibly related to Parkinsons.

## 2021-11-22 NOTE — PROGRESS NOTE ADULT - ATTENDING COMMENTS
70W PMH of HTN, T2DM, asthma, Parkinson's disease, seizure disorder, schizophrenia BIBEMS from [facility] for acute change in mental status and episode of hypotension. Stroke code was called in the ED for anisocoria, however this was discovered to be chronic finding. Patient started returning to baseline in ED and now is fully back to baseline mental status.     # AMS - transient, no definitive inciting cause, suspect medication induced. Patient is on multiple sedative medications prior to arrival.  seen by neurology,   # Hypotension - No HR normal at the time. Infectious workup prelim negative, UCx negative, BCx pending. Very low suspicion for infectious cause.   # Schizophrenia - decompensated and will need transfer back to [facility] .  following appreciated recs. Would reintroduce medications slowly given this is suspected cause at this time. Restart ativan today.

## 2021-11-22 NOTE — PATIENT PROFILE ADULT - HOSPITALS/PSYCHIATRIC FACILITIES
----- Message from Park Reardon PA-C sent at 6/3/2021  4:23 PM CDT -----  Please notify pt that covid was negative     [facility]

## 2021-11-22 NOTE — BH CONSULTATION LIAISON ASSESSMENT NOTE - NSBHINDICATION_PSY_ALL_CORE
uncooperative, poor insight and history of aggression towards staff uncooperative, disorganized, poor insight and history of aggression towards staff

## 2021-11-22 NOTE — BH CONSULTATION LIAISON ASSESSMENT NOTE - SUMMARY
Pt is a 69 y/o AAF, domiciled at [facility], long hx of Schizophrenia with multiple past inpatient hospitalizations, last hospitalization started at [facility] 11/12, past hx of aggression towards staff and peers, no documented hx of SA, pertinent PMH includes HLD, HTN, Parkinson's dx. Seizure d/o, T2DM, Hypothyroidism, Glaucoma, PVD, Asthma, Bladder Ca. Pt was found unconscious on the floor at [facility] on 11/20, BIBEMS from [facility] for acute change in mental status.     Pt is currently being medically managed by the medicine and neurology team for AMS episode. CT head, CTA head and neck and CT perfusion negative for acute pathology. R/o infectious and metabolic causes of AMS.     Pt care was discussed with Dr. Chung at [facility], who confirms patient seems to be back at baseline prior to episode of AMS. Pt is persistently psychotic, with preoccupations, delusions and hallucinations, consistent with history of schizophrenia, currently decompensated. Pt currently lacks insight and is moderately disoriented with disorganized thought making her a harm to herself and others and requires continued inpatient hospitalization for monitoring and medication reconciliation. Patient should be restarted on psychiatric medications due to concern for mental decompensation. Restart [facility] regimen of Risperidone 1.5mg BID. Recommend initiating Ativan 1mg BID, as patient was on this regimen TID consistently at [facility] and want to avoid abrupt discontinuation.    Plan:  - CO 1:1 required due to patient lack of insight and risk to self  - Recommend orthostatic hypotension checks due to possible medication SE for AMS    - Recommend restarting Risperidone 1.5mg BID PO, if no contraindication per primary and neurology team   - Initiate Ativan 1mg BID PO, if no contraindication per primary and neurology team   - Continue Depakote 750mg qHS for seizures  - Continue Carbidopa/levodopa 25/100 TID for parkinson's  - Continue benztropine 0.5mg BID  - If patient agitated and QTc < 500, can use Zyprexa 2.5mg PO/IM q6h PRN per agitation regimen at [facility].   - Hold on restarting full regimen (trazodone, gabapentin), pending clearance from hospital team  - Continue neurology work-up for AMS episode, as per neurology team     Pt is a 69 y/o AAF, domiciled at [facility], long hx of Schizophrenia with multiple past inpatient hospitalizations, last hospitalization started at [facility] 10/13, past hx of aggression towards staff and peers, no documented hx of SA, pertinent PMH includes HLD, HTN, Parkinson's dx. Seizure d/o, T2DM, Hypothyroidism, Glaucoma, PVD, Asthma, Bladder Ca. Pt was found unconscious on the floor at [facility] on 11/20, BIBEMS from [facility] for acute change in mental status.     Pt is currently being medically managed by the medicine and neurology team for AMS episode. CT head, CTA head and neck and CT perfusion has been done, grossly negative. R/o infectious and metabolic causes of AMS.     Pt care was discussed with Dr. Chung at [facility], patient has been manic, labile, tearful and hyperreligious, consistent with history of schizophrenia, currently decompensated. Pt currently lacks insight and is moderately disoriented with disorganized thought making her a harm to herself and others and requires continued inpatient hospitalization for monitoring and medication reconciliation. Patient should be restarted on psychiatric medications due to concern for psychiatric decompensation. Restart [facility] regimen of Risperidone 1.5mg BID. Recommend initiating Ativan 1mg BID, as patient was on this regimen TID at [facility] and want to avoid abrupt discontinuation due to risk of withdrawal.     Plan:  - CO 1:1 required due to patient lack of insight and extremely disorganized, risk to self  - Recommend orthostatic hypotension checks due to possible medication SE  - Recommend restarting Risperidone 1.5mg BID PO, if no contraindication per primary and neurology team   -  Initiate Ativan 1mg BID PO, HOLD for sedation and monitor pulse ox closely (was on 1mg TID at [facility])    - Continue Depakote 750mg qHS for seizures, would defer further titration if needed as per primary/neuro team  ***Monitor LFTs, Platelets, ammonia level and VPA level  -  Continue benztropine 0.5mg BID  - If patient agitated and QTc < 500, can use Zyprexa 2.5mg PO/IM q6h PRN per agitation regimen at [facility].   - Hold on restarting full regimen (trazodone, gabapentin), pending further medical optimization  - Continue neurology work-up for AMS episode, as per neurology team    DO NOT GIVE IM Zyprexa within 2hrs of IM/IV ativan due to risk of respiratory depression

## 2021-11-22 NOTE — PROGRESS NOTE ADULT - PROBLEM SELECTOR PLAN 8
-Diet: passed dysphagia screen, however slow to swallow per RN. will start pureed diet  -VTE ppx: lovenox 40 mg subQ daily  -Dispo: [facility] once medically stabl -Diet: passed dysphagia screen, however slow to swallow per RN. will start pureed diet  -VTE ppx: lovenox 40 mg subQ daily  -Dispo: [facility] once medically stable

## 2021-11-22 NOTE — PROGRESS NOTE ADULT - SUBJECTIVE AND OBJECTIVE BOX
PROGRESS NOTE:   Authored by Dr. Rosa Estrada MD (PGY-1). Pager [facility] [phone]/ [facility]     Patient is a 70y old  Female who presents with a chief complaint of     SUBJECTIVE / OVERNIGHT EVENTS:  No acute events overnight.     ADDITIONAL REVIEW OF SYSTEMS:  Patient denies fevers, chills, chest pain, shortness of breath, nausea, abdominal pain, diarrhea, constipation, dysuria, leg swelling, headache, light headedness.    MEDICATIONS  (STANDING):  ammonium lactate   12% Cream 1 Application(s) Topical two times a day  benztropine 0.5 milliGRAM(s) Oral two times a day  carbidopa/levodopa  25/100 1 Tablet(s) Oral three times a day  dextrose 5%. 1000 milliLiter(s) (50 mL/Hr) IV Continuous <Continuous>  diVALproex Sprinkle 750 milliGRAM(s) Oral at bedtime  enoxaparin Injectable 40 milliGRAM(s) SubCutaneous daily  influenza  Vaccine (HIGH DOSE) 0.7 milliLiter(s) IntraMuscular once  lactulose Syrup 10 Gram(s) Oral at bedtime  levothyroxine 75 MICROGram(s) Oral daily  lisinopril 10 milliGRAM(s) Oral daily  metoprolol tartrate 25 milliGRAM(s) Oral two times a day  senna 1 Tablet(s) Oral two times a day    MEDICATIONS  (PRN):  OLANZapine 2.5 milliGRAM(s) Oral every 6 hours PRN agitation      CAPILLARY BLOOD GLUCOSE      POCT Blood Glucose.: 115 mg/dL (2021 22:36)  POCT Blood Glucose.: 126 mg/dL (2021 20:52)    I&O's Summary      PHYSICAL EXAM:  Vital Signs Last 24 Hrs  T(C): 37.1 (2021 21:36), Max: 37.1 (2021 21:36)  T(F): 98.7 (2021 21:36), Max: 98.7 (2021 21:36)  HR: 78 (2021 21:36) (75 - 102)  BP: 156/74 (2021 21:36) (136/83 - 156/74)  BP(mean): --  RR: 18 (2021 21:36) (14 - 18)  SpO2: 100% (2021 21:36) (100% - 100%)    CONSTITUTIONAL: NAD, well-developed  HEET: MMM, EOMI, PERRLA  NECK: supple  RESPIRATORY: Normal respiratory effort; lungs are clear to auscultation bilaterally  CARDIOVASCULAR: Regular rate and rhythm, normal S1 and S2, no murmur/rub/gallop; No lower extremity edema; Peripheral pulses are 2+ bilaterally  ABDOMEN: Nontender to palpation, normoactive bowel sounds, no rebound/guarding; No hepatosplenomegaly  MUSCULOSKELETAL: no clubbing or cyanosis of digits; no joint swelling or tenderness to palpation  NEURO: Moving all four extremities, sensation grossly intact  PSYCH: A+O to person, place, and time; affect appropriate  SKIN: No rash    LABS:                        10.2   6.01  )-----------( 208      ( 2021 00:14 )             33.5     11-21    142  |  108<H>  |  14  ----------------------------<  108<H>  3.7   |  25  |  0.82    Ca    9.6      2021 23:29  Phos  3.3     11-21  Mg     2.00     11-21    TPro  7.5  /  Alb  3.7  /  TBili  0.2  /  DBili  x   /  AST  22  /  ALT  13  /  AlkPhos  85  11-21    PT/INR - ( 2021 00:14 )   PT: 15.6 sec;   INR: 1.39 ratio         PTT - ( 2021 00:14 )  PTT:38.0 sec  CARDIAC MARKERS ( 2021 00:14 )  x     / x     / 122 U/L / x     / 3.0 ng/mL      Urinalysis Basic - ( 2021 03:41 )    Color: Light Yellow / Appearance: Clear / S.041 / pH: x  Gluc: x / Ketone: Negative  / Bili: Negative / Urobili: <2 mg/dL   Blood: x / Protein: Negative / Nitrite: Negative   Leuk Esterase: Negative / RBC: x / WBC x   Sq Epi: x / Non Sq Epi: x / Bacteria: x        Culture - Urine (collected 2021 03:33)  Source: Clean Catch Clean Catch (Midstream)  Final Report (2021 05:45):    No growth        Tele Reviewed:    RADIOLOGY & ADDITIONAL TESTS:  Results Reviewed:   Imaging Personally Reviewed:  Electrocardiogram Personally Reviewed:     PROGRESS NOTE:   Authored by Dr. Rosa Estrada MD (PGY-1). Pager [facility] [phone]/ [facility]     Patient is a 70y old  Female who presents with a chief complaint of     SUBJECTIVE / OVERNIGHT EVENTS:  States "I want you to call my uncle. He's a " when asked how she is doing.    Denies HA, blurry vision, dizziness.     ADDITIONAL REVIEW OF SYSTEMS:  Patient denies fevers, chills, chest pain, shortness of breath, nausea, abdominal pain, diarrhea, constipation, dysuria, leg swelling, headache, light headedness.    MEDICATIONS  (STANDING):  ammonium lactate   12% Cream 1 Application(s) Topical two times a day  benztropine 0.5 milliGRAM(s) Oral two times a day  carbidopa/levodopa  25/100 1 Tablet(s) Oral three times a day  dextrose 5%. 1000 milliLiter(s) (50 mL/Hr) IV Continuous <Continuous>  diVALproex Sprinkle 750 milliGRAM(s) Oral at bedtime  enoxaparin Injectable 40 milliGRAM(s) SubCutaneous daily  influenza  Vaccine (HIGH DOSE) 0.7 milliLiter(s) IntraMuscular once  lactulose Syrup 10 Gram(s) Oral at bedtime  levothyroxine 75 MICROGram(s) Oral daily  lisinopril 10 milliGRAM(s) Oral daily  metoprolol tartrate 25 milliGRAM(s) Oral two times a day  senna 1 Tablet(s) Oral two times a day    MEDICATIONS  (PRN):  OLANZapine 2.5 milliGRAM(s) Oral every 6 hours PRN agitation      CAPILLARY BLOOD GLUCOSE      POCT Blood Glucose.: 115 mg/dL (2021 22:36)  POCT Blood Glucose.: 126 mg/dL (2021 20:52)    I&O's Summary      PHYSICAL EXAM:  Vital Signs Last 24 Hrs  T(C): 37.1 (2021 21:36), Max: 37.1 (2021 21:36)  T(F): 98.7 (2021 21:36), Max: 98.7 (2021 21:36)  HR: 78 (2021 21:36) (75 - 102)  BP: 156/74 (2021 21:36) (136/83 - 156/74)  BP(mean): --  RR: 18 (2021 21:36) (14 - 18)  SpO2: 100% (2021 21:36) (100% - 100%)    CONSTITUTIONAL: NAD, well-developed  HEENT: blown right pupil, left pupil reactive to light, MMM, EOMI, PERRLA  NECK: supple  RESPIRATORY: Normal respiratory effort; lungs are clear to auscultation bilaterally  CARDIOVASCULAR: Regular rate and rhythm, normal S1 and S2, no murmur/rub/gallop; No lower extremity edema; Peripheral pulses are 2+ bilaterally  ABDOMEN: Nontender to palpation, normoactive bowel sounds, no rebound/guarding; No hepatosplenomegaly  MUSCULOSKELETAL: no clubbing or cyanosis of digits; no joint swelling or tenderness to palpation  NEURO: Strength 5/5 in all extremities, no rigidity, no facial asymmetry Moving all four extremities, sensation grossly intact  PSYCH: A+O to person. Not oriented to place or time. Appears to be responding to internal stimuli but denies AVH, states, "a lot of times I hear my uncle preaching to me." Poor insight, paranoid  SKIN: No rash    LABS:                        10.2   6.01  )-----------( 208      ( 2021 00:14 )             33.5     11-21    142  |  108<H>  |  14  ----------------------------<  108<H>  3.7   |  25  |  0.82    Ca    9.6      2021 23:29  Phos  3.3     -  Mg     2.00     -    TPro  7.5  /  Alb  3.7  /  TBili  0.2  /  DBili  x   /  AST  22  /  ALT  13  /  AlkPhos  85  11-21    PT/INR - ( 2021 00:14 )   PT: 15.6 sec;   INR: 1.39 ratio         PTT - ( 2021 00:14 )  PTT:38.0 sec  CARDIAC MARKERS ( 2021 00:14 )  x     / x     / 122 U/L / x     / 3.0 ng/mL      Urinalysis Basic - ( 2021 03:41 )    Color: Light Yellow / Appearance: Clear / S.041 / pH: x  Gluc: x / Ketone: Negative  / Bili: Negative / Urobili: <2 mg/dL   Blood: x / Protein: Negative / Nitrite: Negative   Leuk Esterase: Negative / RBC: x / WBC x   Sq Epi: x / Non Sq Epi: x / Bacteria: x        Culture - Urine (collected 2021 03:33)  Source: Clean Catch Clean Catch (Midstream)  Final Report (2021 05:45):    No growth        Tele Reviewed:    RADIOLOGY & ADDITIONAL TESTS:  Results Reviewed:   Imaging Personally Reviewed:  Electrocardiogram Personally Reviewed:

## 2021-11-22 NOTE — BH CONSULTATION LIAISON ASSESSMENT NOTE - NSBHCONSULTMEDAGITATION_PSY_A_CORE FT
If patient agitated and QTc < 500, can use Zyprexa 2.5mg PO/IM q6h PRN per agitation regimen at [facility] As above

## 2021-11-22 NOTE — BH CONSULTATION LIAISON ASSESSMENT NOTE - RISK ASSESSMENT
Poor insight, inconsistent cooperativity, history of aggression and current disorganization and disorientation place this patient at moderate risk of harming others. She is also currently unable to care for herself due to level of disorganization.

## 2021-11-22 NOTE — BH CONSULTATION LIAISON ASSESSMENT NOTE - NSBHCHARTREVIEWVS_PSY_A_CORE FT
Vital Signs Last 24 Hrs  T(C): 37.1 (22 Nov 2021 07:00), Max: 37.1 (21 Nov 2021 21:36)  T(F): 98.7 (22 Nov 2021 07:00), Max: 98.7 (21 Nov 2021 21:36)  HR: 92 (22 Nov 2021 07:00) (75 - 102)  BP: 139/84 (22 Nov 2021 07:00) (136/83 - 156/74)  BP(mean): --  RR: 18 (22 Nov 2021 07:00) (16 - 18)  SpO2: 98% (22 Nov 2021 07:00) (98% - 100%)

## 2021-11-22 NOTE — BH CONSULTATION LIAISON ASSESSMENT NOTE - DETAILS
hx of Schizophrenia with multiple past inpatient hospitalization, most recent hospitalization occurred on 11/12 after BIB EMS activated by sister for agitation and worsening hallucination. Pt was medically seen found to have a UTI  exam, once medically cleared sent to [facility] for further management. [facility] Dr. Chung hx of Schizophrenia with multiple past inpatient hospitalization, most recent hospitalization occurred on 10/13 after BIB EMS activated by sister for agitation and worsening hallucination. Patient once medically cleared will be  sent to [facility] for further management. as per chart review, h/o aggression

## 2021-11-22 NOTE — BH CONSULTATION LIAISON ASSESSMENT NOTE - OTHER PAST PSYCHIATRIC HISTORY (INCLUDE DETAILS REGARDING ONSET, COURSE OF ILLNESS, INPATIENT/OUTPATIENT TREATMENT)
hx of Schizophrenia with multiple past inpatient hospitalization, last hospitalization as per psyckes 5/2018 at [facility], past hx of aggression towards staff and peers, no documented hx of SA hx of Schizophrenia with multiple past inpatient hospitalization, last hospitalization as per psyckes 5/2018 at [facility], past hx of aggression towards staff and peers, no documented hx of SA, transferred from [facility] for medical optimization

## 2021-11-22 NOTE — PROGRESS NOTE ADULT - PROBLEM SELECTOR PLAN 2
Transferred from [facility]. Disorganized with disorganized speech. AT baseline mental status. Following commands No SI, AVH    -s/p olanzapine 2.5 mg IM x1 and Versed 5 mg IM x1 in ED for agitation.  -continue depakote 750mg qhs and Cogentin 0.5mg BID  -Per psych, holding trazodone, gabapentin, and Ativan given deliriogenic properties in setting of change in mental status  -Zyprexa 2.5mg PO/IM q6h PRN per agitation   -restart Risperdal 1.5mg BID standing if no objections from neurology Transferred from [facility]. Disorganized with disorganized speech. AT baseline mental status. Following commands No SI, AVH    -s/p olanzapine 2.5 mg IM x1 and Versed 5 mg IM x1 in ED for agitation.  -continue depakote 750mg qhs and Cogentin 0.5mg BID  -Per psych, holding trazodone, gabapentin, and Ativan given deliriogenic properties in setting of change in mental status  -Zyprexa 2.5mg PO/IM q6h PRN per agitation   -restart Risperdal 1.5mg BID and Ativan 1 mg PO BID per psych

## 2021-11-22 NOTE — BH CONSULTATION LIAISON ASSESSMENT NOTE - CASE SUMMARY
Chart reviewed, pt. seen and evaluated with REBECA Francisco, I agree with above assessment and plan, pt. grossly oriented, minimally engaging, superficially cooperative, seems paranoid/suspicious, constantly looking around, easily distracted, thought process is disorganized/tangential, I suspect thought blocking as well, pt. seems internally preoccupied, giving extremely vague answers, denies SI. Plan as above, care coordinated with Dr. Chung at [facility], pt. will need to be transfer back to [facility] once medically optimized as she needs further psychiatric stabilization, will follow

## 2021-11-23 ENCOUNTER — TRANSCRIPTION ENCOUNTER (OUTPATIENT)
Age: 71
End: 2021-11-23

## 2021-11-23 PROBLEM — I10 ESSENTIAL (PRIMARY) HYPERTENSION: Chronic | Status: ACTIVE | Noted: 2021-11-21

## 2021-11-23 LAB
GLUCOSE BLDC GLUCOMTR-MCNC: 72 MG/DL — SIGNIFICANT CHANGE UP (ref 70–99)
GLUCOSE BLDC GLUCOMTR-MCNC: 85 MG/DL — SIGNIFICANT CHANGE UP (ref 70–99)

## 2021-11-23 PROCEDURE — 99233 SBSQ HOSP IP/OBS HIGH 50: CPT

## 2021-11-23 RX ORDER — BENZTROPINE MESYLATE 1 MG
1 TABLET ORAL
Qty: 0 | Refills: 0 | DISCHARGE
Start: 2021-11-23

## 2021-11-23 RX ORDER — OLANZAPINE 15 MG/1
2.5 TABLET, FILM COATED ORAL EVERY 6 HOURS
Refills: 0 | Status: DISCONTINUED | OUTPATIENT
Start: 2021-11-23 | End: 2021-11-24

## 2021-11-23 RX ORDER — LACTULOSE 10 G/15ML
15 SOLUTION ORAL
Qty: 0 | Refills: 0 | DISCHARGE
Start: 2021-11-23

## 2021-11-23 RX ORDER — LISINOPRIL 2.5 MG/1
1 TABLET ORAL
Qty: 0 | Refills: 0 | DISCHARGE
Start: 2021-11-23

## 2021-11-23 RX ORDER — METOPROLOL TARTRATE 50 MG
1 TABLET ORAL
Qty: 0 | Refills: 0 | DISCHARGE
Start: 2021-11-23

## 2021-11-23 RX ORDER — SOD,AMMONIUM,POTASSIUM LACTATE
1 CREAM (GRAM) TOPICAL
Qty: 0 | Refills: 0 | DISCHARGE
Start: 2021-11-23

## 2021-11-23 RX ORDER — HYDROCORTISONE 1 %
1 OINTMENT (GRAM) TOPICAL EVERY 12 HOURS
Refills: 0 | Status: DISCONTINUED | OUTPATIENT
Start: 2021-11-23 | End: 2021-12-03

## 2021-11-23 RX ORDER — DIVALPROEX SODIUM 500 MG/1
6 TABLET, DELAYED RELEASE ORAL
Qty: 0 | Refills: 0 | DISCHARGE
Start: 2021-11-23

## 2021-11-23 RX ORDER — OLANZAPINE 15 MG/1
2.5 TABLET, FILM COATED ORAL
Qty: 0 | Refills: 0 | DISCHARGE
Start: 2021-11-23

## 2021-11-23 RX ORDER — LEVOTHYROXINE SODIUM 125 MCG
1 TABLET ORAL
Qty: 0 | Refills: 0 | DISCHARGE
Start: 2021-11-23

## 2021-11-23 RX ORDER — CARBIDOPA AND LEVODOPA 25; 100 MG/1; MG/1
1 TABLET ORAL
Qty: 0 | Refills: 0 | DISCHARGE
Start: 2021-11-23

## 2021-11-23 RX ORDER — RISPERIDONE 4 MG/1
3 TABLET ORAL
Qty: 0 | Refills: 0 | DISCHARGE
Start: 2021-11-23

## 2021-11-23 RX ORDER — HYDROCORTISONE 1 %
1 OINTMENT (GRAM) TOPICAL
Qty: 0 | Refills: 0 | DISCHARGE
Start: 2021-11-23

## 2021-11-23 RX ORDER — SENNA PLUS 8.6 MG/1
1 TABLET ORAL
Qty: 0 | Refills: 0 | DISCHARGE
Start: 2021-11-23

## 2021-11-23 RX ORDER — OLANZAPINE 15 MG/1
2.5 TABLET, FILM COATED ORAL ONCE
Refills: 0 | Status: COMPLETED | OUTPATIENT
Start: 2021-11-23 | End: 2021-11-23

## 2021-11-23 RX ADMIN — Medication 1 MILLIGRAM(S): at 18:35

## 2021-11-23 RX ADMIN — LISINOPRIL 10 MILLIGRAM(S): 2.5 TABLET ORAL at 06:32

## 2021-11-23 RX ADMIN — CARBIDOPA AND LEVODOPA 1 TABLET(S): 25; 100 TABLET ORAL at 12:54

## 2021-11-23 RX ADMIN — CARBIDOPA AND LEVODOPA 1 TABLET(S): 25; 100 TABLET ORAL at 06:31

## 2021-11-23 RX ADMIN — Medication 1 MILLIGRAM(S): at 06:41

## 2021-11-23 RX ADMIN — Medication 1 APPLICATION(S): at 18:24

## 2021-11-23 RX ADMIN — CARBIDOPA AND LEVODOPA 1 TABLET(S): 25; 100 TABLET ORAL at 21:39

## 2021-11-23 RX ADMIN — RISPERIDONE 1.5 MILLIGRAM(S): 4 TABLET ORAL at 18:23

## 2021-11-23 RX ADMIN — ENOXAPARIN SODIUM 40 MILLIGRAM(S): 100 INJECTION SUBCUTANEOUS at 12:53

## 2021-11-23 RX ADMIN — SENNA PLUS 1 TABLET(S): 8.6 TABLET ORAL at 06:33

## 2021-11-23 RX ADMIN — Medication 1 APPLICATION(S): at 06:33

## 2021-11-23 RX ADMIN — Medication 75 MICROGRAM(S): at 06:32

## 2021-11-23 RX ADMIN — Medication 0.5 MILLIGRAM(S): at 18:35

## 2021-11-23 RX ADMIN — OLANZAPINE 2.5 MILLIGRAM(S): 15 TABLET, FILM COATED ORAL at 00:29

## 2021-11-23 RX ADMIN — Medication 25 MILLIGRAM(S): at 06:32

## 2021-11-23 RX ADMIN — RISPERIDONE 1.5 MILLIGRAM(S): 4 TABLET ORAL at 06:31

## 2021-11-23 RX ADMIN — DIVALPROEX SODIUM 750 MILLIGRAM(S): 500 TABLET, DELAYED RELEASE ORAL at 22:15

## 2021-11-23 RX ADMIN — OLANZAPINE 2.5 MILLIGRAM(S): 15 TABLET, FILM COATED ORAL at 10:33

## 2021-11-23 RX ADMIN — LACTULOSE 10 GRAM(S): 10 SOLUTION ORAL at 21:39

## 2021-11-23 RX ADMIN — SENNA PLUS 1 TABLET(S): 8.6 TABLET ORAL at 18:23

## 2021-11-23 RX ADMIN — Medication 0.5 MILLIGRAM(S): at 06:33

## 2021-11-23 RX ADMIN — Medication 25 MILLIGRAM(S): at 18:25

## 2021-11-23 NOTE — DISCHARGE NOTE PROVIDER - HOSPITAL COURSE
HPI:    71 yo F with PMH of HTN, T2DM, asthma, seizure disorder, Parkinson's disease, glaucoma, schizophrenia BIBEMS from [facility] for acute change in mental status. At approximately 22:15, the patient was noted to be lethargic by staff, and not responding to noxious stimuli. Last known normal 21:00. Per EMS, patient was noted to have SBP in the 80s at the time. She was placed in trendelenberg with subsequent improvement in her BP and a return to normotension upon ED arrival. Upon evaluation on arrival, the patient was responsive to painful stimuli and moving extremities. She was also noted to have asymmetric pupils and a code stroke was called. At baseline she is reported to be dysarthric, AAOx3 and able to ambulate independently. Pt is currently fully oriented. Pt reports she has had blown right pupil "for years." Denies blurry vision, HA, dizziness, weakness, numbness. chest pain, SOB, cough, abdominal pain, dysuria, hematuria, hematochezia, SI, AVH. Endorses chronic constipation. Last BM was 2 days ago.     ED vitals: afebrile, HR 69 /89, RR 18, 99% on RA. Given olanzapine 2.5 mg IM x1 and Versed 5 mg IM x1 for agitation. WBC wnl. UA negative. CXR clear. CT Head, CTA perfusion and CTA head and neck showed no acute pathology and no significant stenosis. Pt evaluated by neurology in ED and noted to have improvement in her mental status, as she was awake, alert, and speaking, but not following all commands.    Hospital Course:       HPI:    69 yo F with PMH of HTN, T2DM, asthma, seizure disorder, Parkinson's disease, glaucoma, schizophrenia BIBEMS from [facility] for acute change in mental status. At approximately 22:15, the patient was noted to be lethargic by staff, and not responding to noxious stimuli. Last known normal 21:00. Per EMS, patient was noted to have SBP in the 80s at the time. She was placed in trendelenberg with subsequent improvement in her BP and a return to normotension upon ED arrival. Upon evaluation on arrival, the patient was responsive to painful stimuli and moving extremities. She was also noted to have asymmetric pupils and a code stroke was called. At baseline she is reported to be dysarthric, AAOx3 and able to ambulate independently. Pt is currently fully oriented. Pt reports she has had blown right pupil "for years." Denies blurry vision, HA, dizziness, weakness, numbness. chest pain, SOB, cough, abdominal pain, dysuria, hematuria, hematochezia, SI, AVH. Endorses chronic constipation. Last BM was 2 days ago.     ED vitals: afebrile, HR 69 /89, RR 18, 99% on RA. Given olanzapine 2.5 mg IM x1 and Versed 5 mg IM x1 for agitation. WBC wnl. UA negative. CXR clear. CT Head, CTA perfusion and CTA head and neck showed no acute pathology and no significant stenosis. Pt evaluated by neurology in ED and noted to have improvement in her mental status, as she was awake, alert, and speaking, but not following all commands.    Hospital Course:       HPI:    71 yo F with PMH of HTN, T2DM, asthma, seizure disorder, Parkinson's disease, glaucoma, schizophrenia BIBEMS from [facility] for acute change in mental status. At approximately 22:15, the patient was noted to be lethargic by staff, and not responding to noxious stimuli. Last known normal 21:00. Per EMS, patient was noted to have SBP in the 80s at the time. She was placed in trendelenberg with subsequent improvement in her BP and a return to normotension upon ED arrival. Upon evaluation on arrival, the patient was responsive to painful stimuli and moving extremities. She was also noted to have asymmetric pupils and a code stroke was called. At baseline she is reported to be dysarthric, AAOx3 and able to ambulate independently. Pt is currently fully oriented. Pt reports she has had blown right pupil "for years." Denies blurry vision, HA, dizziness, weakness, numbness. chest pain, SOB, cough, abdominal pain, dysuria, hematuria, hematochezia, SI, AVH. Endorses chronic constipation. Last BM was 2 days ago.     ED vitals: afebrile, HR 69 /89, RR 18, 99% on RA. Given olanzapine 2.5 mg IM x1 and Versed 5 mg IM x1 for agitation. WBC wnl. UA negative. CXR clear. CT Head, CTA perfusion and CTA head and neck showed no acute pathology and no significant stenosis. Pt evaluated by neurology in ED and noted to have improvement in her mental status, as she was awake, alert, and speaking, but not following all commands. EKG showed NSR and     Hospital Course:  AMS likely      HPI:    69 yo F with PMH of HTN, T2DM, asthma, seizure disorder, Parkinson's disease, glaucoma, schizophrenia BIBEMS from [facility] for acute change in mental status. At approximately 22:15, the patient was noted to be lethargic by staff, and not responding to noxious stimuli. Last known normal 21:00. Per EMS, patient was noted to have SBP in the 80s at the time. She was placed in trendelenberg with subsequent improvement in her BP and a return to normotension upon ED arrival. Upon evaluation on arrival, the patient was responsive to painful stimuli and moving extremities. She was also noted to have asymmetric pupils and a code stroke was called. At baseline she is reported to be dysarthric, AAOx3 and able to ambulate independently. Pt is currently fully oriented. Pt reports she has had blown right pupil "for years." Denies blurry vision, HA, dizziness, weakness, numbness. chest pain, SOB, cough, abdominal pain, dysuria, hematuria, hematochezia, SI, AVH. Endorses chronic constipation. Last BM was 2 days ago.     ED vitals: afebrile, HR 69 /89, RR 18, 99% on RA. Given olanzapine 2.5 mg IM x1 and Versed 5 mg IM x1 for agitation. WBC wnl. UA negative. CXR clear. CT Head, CTA perfusion and CTA head and neck showed no acute pathology and no significant stenosis. Pt evaluated by neurology in ED and noted to have improvement in her mental status, as she was awake, alert, and speaking, but not following all commands. EKG showed NSR and no signs of ischemia, QTc 420    Hospital Course:  AMS likely medication-induced given lethargy in setting of hypotension prior to arrival. Behavioral health and neurology were consulted. Infectious, toxic, and metabolic work up was negative. Urine culture and blood cultures were negative. CT Head, CT perfusion, CTA head and neck were negative for acute pathology and significant stenosis. WBC, Lactate, ammonia, CK, TSH, folate, B12 were all wnl. Orthostatic vitals were normal. Behavioral health recommended inpatient psychiatric admission for further psychiatric treatment and stabilization.     Patient is back at her baseline mental status and is medically optimized for transfer to [facility]. HPI:    71 yo F with PMH of HTN, T2DM, asthma, seizure disorder, Parkinson's disease, glaucoma, schizophrenia BIBEMS from [facility] for acute change in mental status. At approximately 22:15, the patient was noted to be lethargic by staff, and not responding to noxious stimuli. Last known normal 21:00. Per EMS, patient was noted to have SBP in the 80s at the time. She was placed in trendelenberg with subsequent improvement in her BP and a return to normotension upon ED arrival. Upon evaluation on arrival, the patient was responsive to painful stimuli and moving extremities. She was also noted to have asymmetric pupils and a code stroke was called. At baseline she is reported to be dysarthric, AAOx3 and able to ambulate independently. Pt is currently fully oriented. Pt reports she has had blown right pupil "for years." Denies blurry vision, HA, dizziness, weakness, numbness. chest pain, SOB, cough, abdominal pain, dysuria, hematuria, hematochezia, SI, AVH. Endorses chronic constipation. Last BM was 2 days ago.     ED vitals: afebrile, HR 69 /89, RR 18, 99% on RA. Given olanzapine 2.5 mg IM x1 and Versed 5 mg IM x1 for agitation. WBC wnl. UA negative. CXR clear. CT Head, CTA perfusion and CTA head and neck showed no acute pathology and no significant stenosis. Pt evaluated by neurology in ED and noted to have improvement in her mental status, as she was awake, alert, and speaking, but not following all commands. EKG showed NSR and no signs of ischemia, QTc 420    Hospital Course:  AMS likely medication-induced given lethargy in setting of hypotension prior to arrival. Behavioral health and neurology were consulted. Infectious, toxic, and metabolic work up was negative. Urine culture and blood cultures were negative. CT Head, CT perfusion, CTA head and neck were negative for acute pathology and significant stenosis. WBC, Lactate, ammonia, CK, TSH, folate, B12 were all wnl. Orthostatic vitals were normal. Behavioral health recommended inpatient psychiatric admission for further psychiatric treatment and stabilization.     Patient is back at her baseline mental status and is medically optimized for transfer to ________________. HPI:    69 yo F with PMH of HTN, T2DM, asthma, seizure disorder, Parkinson's disease, glaucoma, schizophrenia BIBEMS from [facility] for acute change in mental status. At approximately 22:15, the patient was noted to be lethargic by staff, and not responding to noxious stimuli. Last known normal 21:00. Per EMS, patient was noted to have SBP in the 80s at the time. She was placed in trendelenberg with subsequent improvement in her BP and a return to normotension upon ED arrival. Upon evaluation on arrival, the patient was responsive to painful stimuli and moving extremities. She was also noted to have asymmetric pupils and a code stroke was called. At baseline she is reported to be dysarthric, AAOx3 and able to ambulate independently. Pt is currently fully oriented. Pt reports she has had blown right pupil "for years." Denies blurry vision, HA, dizziness, weakness, numbness. chest pain, SOB, cough, abdominal pain, dysuria, hematuria, hematochezia, SI, AVH. Endorses chronic constipation. Last BM was 2 days ago.     ED vitals: afebrile, HR 69 /89, RR 18, 99% on RA. Given olanzapine 2.5 mg IM x1 and Versed 5 mg IM x1 for agitation. WBC wnl. UA negative. CXR clear. CT Head, CTA perfusion and CTA head and neck showed no acute pathology and no significant stenosis. Pt evaluated by neurology in ED and noted to have improvement in her mental status, as she was awake, alert, and speaking, but not following all commands. EKG showed NSR and no signs of ischemia, QTc 420    Hospital Course:  AMS likely medication-induced given lethargy in setting of hypotension prior to arrival. Behavioral health and neurology were consulted. Infectious, toxic, and metabolic work up was negative. Urine culture and blood cultures were negative. CT Head, CT perfusion, CTA head and neck were negative for acute pathology and significant stenosis. WBC, Lactate, ammonia, CK, TSH, folate, B12 were all wnl. Orthostatic vitals were normal. Behavioral health recommended inpatient psychiatric admission for further psychiatric treatment and stabilization.     Patient is back at her baseline mental status and is medically optimized for transfer to [facility].

## 2021-11-23 NOTE — DISCHARGE NOTE PROVIDER - NSDCMRMEDTOKEN_GEN_ALL_CORE_FT
ammonium lactate 12% topical cream: 1 application topically 2 times a day  benztropine 0.5 mg oral tablet: 1 tab(s) orally 2 times a day  divalproex sodium 125 mg oral delayed release capsule: 6 cap(s) orally once a day (at bedtime)  lactulose 10 g/15 mL oral syrup: 15 milliliter(s) orally once a day (at bedtime)  levothyroxine 75 mcg (0.075 mg) oral tablet: 1 tab(s) orally once a day  lisinopril 10 mg oral tablet: 1 tab(s) orally once a day  metFORMIN 500 mg oral tablet: 1 tab(s) orally   metoprolol tartrate 25 mg oral tablet: 1 tab(s) orally 2 times a day  senna oral tablet: 1 tab(s) orally 2 times a day   ammonium lactate 12% topical cream: 1 application topically 2 times a day  benztropine 0.5 mg oral tablet: 1 tab(s) orally 2 times a day  carbidopa-levodopa 25 mg-100 mg oral tablet: 1 tab(s) orally 3 times a day  divalproex sodium 125 mg oral delayed release capsule: 6 cap(s) orally once a day (at bedtime)  hydrocortisone 1% topical cream: 1 application topically every 12 hours for active hemorrhoids  lactulose 10 g/15 mL oral syrup: 15 milliliter(s) orally once a day (at bedtime)  levothyroxine 75 mcg (0.075 mg) oral tablet: 1 tab(s) orally once a day  lisinopril 10 mg oral tablet: 1 tab(s) orally once a day  LORazepam 1 mg oral tablet: 1 tab(s) orally 2 times a day  metFORMIN 500 mg oral tablet: 1 tab(s) orally   metoprolol tartrate 25 mg oral tablet: 1 tab(s) orally 2 times a day  OLANZapine 10 mg intramuscular injection: 2.5 milligram(s) intramuscular every 6 hours, As needed, agitation  risperiDONE 0.5 mg oral tablet: 3 tab(s) orally 2 times a day  senna oral tablet: 1 tab(s) orally 2 times a day   ammonium lactate 12% topical cream: 1 application topically 2 times a day  benztropine: 0.25 milligram(s) orally 2 times a day  carbidopa-levodopa 25 mg-100 mg oral tablet: 1 tab(s) orally 3 times a day  clotrimazole 1% topical cream: 1 application topically 2 times a day  divalproex sodium 125 mg oral delayed release capsule: 6 cap(s) orally once a day (at bedtime)  divalproex sodium 500 mg oral delayed release tablet: 1 tab(s) orally once a day (in the morning)  hydrocortisone 1% topical cream: 1 application topically every 12 hours for active hemorrhoids  lactulose 10 g/15 mL oral syrup: 15 milliliter(s) orally once a day (at bedtime)  levothyroxine 75 mcg (0.075 mg) oral tablet: 1 tab(s) orally once a day  lisinopril 10 mg oral tablet: 1 tab(s) orally once a day  LORazepam 1 mg oral tablet: 1 tab(s) orally every 12 hours  metoprolol tartrate 25 mg oral tablet: 1 tab(s) orally 2 times a day  OLANZapine 10 mg intramuscular injection: 2.5 milligram(s) intramuscular every 6 hours, As needed, agitation  OLANZapine 5 mg oral tablet: 1 tab(s) orally 3 times a day  polyethylene glycol 3350 oral powder for reconstitution: 17 gram(s) orally once a day  QUEtiapine 50 mg oral tablet: 1 tab(s) orally every 4 hours, As needed, agitation  risperiDONE 0.5 mg oral tablet: 3 tab(s) orally 2 times a day  senna oral tablet: 1 tab(s) orally 2 times a day   ammonium lactate 12% topical cream: 1 application topically 2 times a day  carbidopa-levodopa 25 mg-100 mg oral tablet: 1 tab(s) orally 3 times a day  cephalexin 500 mg oral capsule: 1 cap(s) orally 2 times a day   Last dose Dec 23 evening   clotrimazole 1% topical cream: 1 application topically 2 times a day  Cranberry oral capsule: 400 milligram(s) orally 2 times a day  hydrocortisone 1% topical cream: 1 application topically every 12 hours for active hemorrhoids  levothyroxine 75 mcg (0.075 mg) oral tablet: 1 tab(s) orally every 24 hours  lisinopril 10 mg oral tablet: 1 tab(s) orally once a day  metoprolol tartrate 25 mg oral tablet: 1 tab(s) orally 2 times a day  Multiple Vitamins oral tablet: 1 tab(s) orally once a day  OLANZapine 10 mg intramuscular injection: 2.5 milligram(s) intramuscular every 8 hours, As needed, agitation  polyethylene glycol 3350 oral powder for reconstitution: 17 gram(s) orally every 12 hours  senna oral tablet: 2 tab(s) orally once a day (at bedtime)  valACYclovir 1 g oral tablet: 1 tab(s) orally every 12 hours  valproic acid 250 mg oral capsule: 1 cap(s) orally every 8 hours

## 2021-11-23 NOTE — BH CONSULTATION LIAISON PROGRESS NOTE - CASE SUMMARY
Chart reviewed, pt. seen and evaluated with MS3 Maryam, I agree with above assessment and plan, pt. continues to be agitated, requiring multiple PRNs, pt. uncooperative, disorganized, labile  and easily agitated during an interview, refusing to answer most of the questions, she is endorsing delusions, stating that staff are all out to get her and want to harm her. Interview rather limited due to lack of cooperation. Plan as above, pt. will need an inpatient psychiatric admission for further treatment/stabilization, 2PC in the chart, case d/w Dr. Don, will follow

## 2021-11-23 NOTE — DISCHARGE NOTE NURSING/CASE MANAGEMENT/SOCIAL WORK - NSDCPNINST_GEN_ALL_CORE
Patient alert and oriented x 1-2 with confusion, restlessness, and loud outbursts. Vital signs stable. Sin dry and intact. Safety maintained

## 2021-11-23 NOTE — DISCHARGE NOTE PROVIDER - NSDCCPCAREPLAN_GEN_ALL_CORE_FT
PRINCIPAL DISCHARGE DIAGNOSIS  Diagnosis: Altered mental status  Assessment and Plan of Treatment: You came to the hospital due to a change in your mental status. This was likely due to side effects of medications you are taking. Infectious, metabolic and toxic causes of your change in mental status were ruled out. Brain imaging showed no signs of an acute stroke or hemorrhage. You were seen by behavioral health and neurology. Behavioral health recommended inpatient psychiatric admission for further psychiatric treatment       PRINCIPAL DISCHARGE DIAGNOSIS  Diagnosis: Altered mental status  Assessment and Plan of Treatment: You came to the hospital due to a change in your mental status. This was likely due to side effects of medications you are taking. Infectious, metabolic and toxic causes of your change in mental status were ruled out. Brain imaging showed no signs of an acute stroke or hemorrhage. You were seen by behavioral health and neurology. Behavioral health recommended inpatient psychiatric admission for further psychiatric treatment and you are being transferred to [facility]

## 2021-11-23 NOTE — PROVIDER CONTACT NOTE (OTHER) - NAME OF MD/NP/PA/DO NOTIFIED:
~90 day phone teletrace  Intrinsic Rhythm at time of check. Magnet response WNL.  F/U scheduled q 3 months. INNA MccormickT     Dr Estrada

## 2021-11-23 NOTE — PROGRESS NOTE ADULT - SUBJECTIVE AND OBJECTIVE BOX
PROGRESS NOTE:   Authored by Dr. Rosa Estrada MD (PGY-1). Pager [facility] [phone]/ [facility]     Patient is a 70y old  Female who presents with a chief complaint of     SUBJECTIVE / OVERNIGHT EVENTS:  Patient agitated and screaming overnight. Given Zyprexa 2.5 mg IM    ADDITIONAL REVIEW OF SYSTEMS:  Patient denies fevers, chills, chest pain, shortness of breath, nausea, abdominal pain, diarrhea, constipation, dysuria, leg swelling, headache, light headedness.    MEDICATIONS  (STANDING):  ammonium lactate   12% Cream 1 Application(s) Topical two times a day  benztropine 0.5 milliGRAM(s) Oral two times a day  carbidopa/levodopa  25/100 1 Tablet(s) Oral three times a day  diVALproex Sprinkle 750 milliGRAM(s) Oral at bedtime  enoxaparin Injectable 40 milliGRAM(s) SubCutaneous daily  influenza  Vaccine (HIGH DOSE) 0.7 milliLiter(s) IntraMuscular once  lactulose Syrup 10 Gram(s) Oral at bedtime  levothyroxine 75 MICROGram(s) Oral daily  lisinopril 10 milliGRAM(s) Oral daily  LORazepam     Tablet 1 milliGRAM(s) Oral two times a day  metoprolol tartrate 25 milliGRAM(s) Oral two times a day  risperiDONE   Tablet 1.5 milliGRAM(s) Oral two times a day  senna 1 Tablet(s) Oral two times a day    MEDICATIONS  (PRN):  dibucaine 1% Ointment 1 Application(s) Topical every 6 hours PRN rectal pain      CAPILLARY BLOOD GLUCOSE      POCT Blood Glucose.: 99 mg/dL (22 Nov 2021 22:20)  POCT Blood Glucose.: 122 mg/dL (22 Nov 2021 08:39)    I&O's Summary    22 Nov 2021 07:01  -  23 Nov 2021 07:00  --------------------------------------------------------  IN: 300 mL / OUT: 0 mL / NET: 300 mL        PHYSICAL EXAM:  Vital Signs Last 24 Hrs  T(C): 36.3 (23 Nov 2021 06:47), Max: 36.9 (22 Nov 2021 15:39)  T(F): 97.4 (23 Nov 2021 06:47), Max: 98.5 (22 Nov 2021 15:39)  HR: 86 (23 Nov 2021 06:47) (71 - 106)  BP: 152/84 (23 Nov 2021 06:47) (129/77 - 152/84)  BP(mean): --  RR: 17 (23 Nov 2021 06:47) (17 - 20)  SpO2: 100% (23 Nov 2021 06:47) (93% - 100%)    CONSTITUTIONAL: NAD, well-developed  HEENT: blown right pupil, left pupil reactive to light, MMM, EOMI, PERRLA  NECK: supple  RESPIRATORY: Normal respiratory effort; lungs are clear to auscultation bilaterally  CARDIOVASCULAR: Regular rate and rhythm, normal S1 and S2, no murmur/rub/gallop; No lower extremity edema; Peripheral pulses are 2+ bilaterally  ABDOMEN: Nontender to palpation, normoactive bowel sounds, no rebound/guarding; No hepatosplenomegaly  MUSCULOSKELETAL: no clubbing or cyanosis of digits; no joint swelling or tenderness to palpation  NEURO: Strength 5/5 in all extremities, no rigidity, no facial asymmetry. Moving all four extremities, sensation grossly intact  PSYCH: A+O to person. Not oriented to place or time. Appears to be responding to internal stimuli but denies AVH, states, "a lot of times I hear my uncle preaching to me." Poor insight, paranoid  SKIN: No rash    LABS:                        11.7   7.90  )-----------( 243      ( 22 Nov 2021 08:33 )             36.2     11-22    141  |  108<H>  |  13  ----------------------------<  104<H>  4.0   |  23  |  0.74    Ca    9.6      22 Nov 2021 08:33  Phos  3.2     11-22  Mg     1.90     11-22    TPro  7.5  /  Alb  3.7  /  TBili  0.2  /  DBili  x   /  AST  22  /  ALT  13  /  AlkPhos  85  11-21              Culture - Blood (collected 21 Nov 2021 16:27)  Source: .Blood Blood-Peripheral  Preliminary Report (22 Nov 2021 17:02):    No growth to date.    Culture - Blood (collected 21 Nov 2021 16:27)  Source: .Blood Blood-Peripheral  Preliminary Report (22 Nov 2021 17:02):    No growth to date.    Culture - Urine (collected 21 Nov 2021 03:33)  Source: Clean Catch Clean Catch (Midstream)  Final Report (22 Nov 2021 05:45):    No growth        Tele Reviewed:    RADIOLOGY & ADDITIONAL TESTS:  Results Reviewed:   Imaging Personally Reviewed:  Electrocardiogram Personally Reviewed:     PROGRESS NOTE:   Authored by Dr. Rosa Estrada MD (PGY-1). Pager [facility] [phone]/ [facility]     Patient is a 70y old  Female who presents with a chief complaint of     SUBJECTIVE / OVERNIGHT EVENTS:  Patient agitated and screaming overnight. Given Zyprexa 2.5 mg IM. Pt reports she is tired as she did not sleep last night. When asked why, she states she has difficulty falling asleep due to racing thoughts. Pt asking for medication to help her sleep    PTnoted to have some bright red blood with BM yesterday by PCA. Pt states she has hemorrhoids. Endorses frequent straining and occasional rectal pain    ADDITIONAL REVIEW OF SYSTEMS:  Patient denies fevers, chills, chest pain, shortness of breath, nausea, abdominal pain, diarrhea, constipation, dysuria, leg swelling, headache, light headedness.    MEDICATIONS  (STANDING):  ammonium lactate   12% Cream 1 Application(s) Topical two times a day  benztropine 0.5 milliGRAM(s) Oral two times a day  carbidopa/levodopa  25/100 1 Tablet(s) Oral three times a day  diVALproex Sprinkle 750 milliGRAM(s) Oral at bedtime  enoxaparin Injectable 40 milliGRAM(s) SubCutaneous daily  influenza  Vaccine (HIGH DOSE) 0.7 milliLiter(s) IntraMuscular once  lactulose Syrup 10 Gram(s) Oral at bedtime  levothyroxine 75 MICROGram(s) Oral daily  lisinopril 10 milliGRAM(s) Oral daily  LORazepam     Tablet 1 milliGRAM(s) Oral two times a day  metoprolol tartrate 25 milliGRAM(s) Oral two times a day  risperiDONE   Tablet 1.5 milliGRAM(s) Oral two times a day  senna 1 Tablet(s) Oral two times a day    MEDICATIONS  (PRN):  dibucaine 1% Ointment 1 Application(s) Topical every 6 hours PRN rectal pain      CAPILLARY BLOOD GLUCOSE      POCT Blood Glucose.: 99 mg/dL (22 Nov 2021 22:20)  POCT Blood Glucose.: 122 mg/dL (22 Nov 2021 08:39)    I&O's Summary    22 Nov 2021 07:01  -  23 Nov 2021 07:00  --------------------------------------------------------  IN: 300 mL / OUT: 0 mL / NET: 300 mL        PHYSICAL EXAM:  Vital Signs Last 24 Hrs  T(C): 36.3 (23 Nov 2021 06:47), Max: 36.9 (22 Nov 2021 15:39)  T(F): 97.4 (23 Nov 2021 06:47), Max: 98.5 (22 Nov 2021 15:39)  HR: 86 (23 Nov 2021 06:47) (71 - 106)  BP: 152/84 (23 Nov 2021 06:47) (129/77 - 152/84)  BP(mean): --  RR: 17 (23 Nov 2021 06:47) (17 - 20)  SpO2: 100% (23 Nov 2021 06:47) (93% - 100%)    CONSTITUTIONAL: NAD, well-developed  HEENT: blown right pupil, left pupil reactive to light, MMM, EOMI, PERRLA  NECK: supple  RESPIRATORY: Normal respiratory effort; lungs are clear to auscultation bilaterally  CARDIOVASCULAR: Regular rate and rhythm, normal S1 and S2, no murmur/rub/gallop; No lower extremity edema; Peripheral pulses are 2+ bilaterally  ABDOMEN: Nontender to palpation, normoactive bowel sounds, no rebound/guarding; No hepatosplenomegaly. External hemorrhoid present  MUSCULOSKELETAL: no clubbing or cyanosis of digits; no joint swelling or tenderness to palpation  NEURO: Strength 5/5 in all extremities, no rigidity, no facial asymmetry. Moving all four extremities, sensation grossly intact  PSYCH: A+O to person. Oriented to person and place. Disorganized thought process. Poor insight, paranoid, Presybeterian preoccupations, speaking about "evil man" but does not reveal additional details when asked, difficult to obtain additional information  SKIN: No rash    LABS:                        11.7   7.90  )-----------( 243      ( 22 Nov 2021 08:33 )             36.2     11-22    141  |  108<H>  |  13  ----------------------------<  104<H>  4.0   |  23  |  0.74    Ca    9.6      22 Nov 2021 08:33  Phos  3.2     11-22  Mg     1.90     11-22    TPro  7.5  /  Alb  3.7  /  TBili  0.2  /  DBili  x   /  AST  22  /  ALT  13  /  AlkPhos  85  11-21              Culture - Blood (collected 21 Nov 2021 16:27)  Source: .Blood Blood-Peripheral  Preliminary Report (22 Nov 2021 17:02):    No growth to date.    Culture - Blood (collected 21 Nov 2021 16:27)  Source: .Blood Blood-Peripheral  Preliminary Report (22 Nov 2021 17:02):    No growth to date.    Culture - Urine (collected 21 Nov 2021 03:33)  Source: Clean Catch Clean Catch (Midstream)  Final Report (22 Nov 2021 05:45):    No growth        Tele Reviewed:    RADIOLOGY & ADDITIONAL TESTS:  Results Reviewed:   Imaging Personally Reviewed:  Electrocardiogram Personally Reviewed:

## 2021-11-23 NOTE — PROGRESS NOTE ADULT - ASSESSMENT
71 yo F with PMH of HTN, T2DM, asthma, seizure disorder, Parkinson's disease, glaucoma, schizophrenia BIBEMS from [facility] for acute change in mental status. CT head, CTA heach and neck and CT perfusion negative for acute pathology. R/o infectious and metabolic causes of AMS 69 yo F with PMH of HTN, T2DM, asthma, seizure disorder, Parkinson's disease, glaucoma, schizophrenia, hemorrhoids BIBEMS from [facility] for acute change in mental status. CT head, CTA heach and neck and CT perfusion negative for acute pathology. R/o infectious and metabolic causes of AMS

## 2021-11-23 NOTE — PROGRESS NOTE ADULT - ATTENDING COMMENTS
70W PMH of HTN, T2DM, asthma, Parkinson's disease, seizure disorder, schizophrenia BIBEMS from [facility] for acute change in mental status and episode of hypotension. Stroke code was called in the ED for anisocoria, however this was discovered to be chronic finding. Hypotension and lethargy improved in the ED and admitted to medicine to rule out secondary causes.    # AMS - transient, no definitive inciting cause, suspect medication induced. Patient is on multiple sedative medications prior to arrival.  seen by neurology, racquel, appreciate recs. No need for MRI/EEG. Holding gabapentin and tizanidine. Resumed psychotropic meds given increased agitation/ hallucinations today.   # Hypotension -Infectious workup prelim negative, UCx negative, BCx NGTD. Very low suspicion for infectious cause. No evidence of arrythmia.  # Schizophrenia - decompensated and will need transfer back to [facility] .  following appreciated recs. Ativan reintroduced yesterday, would continue to reintroduce medications cautiously given this is suspected cause at this time.   d/c to [facility] today d/c planning 40 min coordinating care 70W PMH of HTN, T2DM, asthma, Parkinson's disease, seizure disorder, schizophrenia BIBEMS from [facility] for acute change in mental status and episode of hypotension. Stroke code was called in the ED for anisocoria, however this was discovered to be chronic finding. Hypotension and lethargy improved in the ED and admitted to medicine to rule out secondary causes.    # AMS - transient, no definitive inciting cause, suspect medication induced. Patient is on multiple sedative medications prior to arrival.  seen by neurology, racquel, appreciate recs. No need for MRI/EEG. Holding gabapentin and tizanidine. Resumed psychotropic meds given increased agitation/ hallucinations today.   # Hypotension - Secondary workup unrevealing, likely medication induced   # Schizophrenia - decompensated and will need transfer back to [facility] .  following appreciated recs. Ativan reintroduced yesterday, would continue to reintroduce medications cautiously given this is suspected cause at this time.   d/c to [facility] today d/c planning 40 min coordinating care

## 2021-11-23 NOTE — DISCHARGE NOTE PROVIDER - PROVIDER TOKENS
FREE:[LAST:[[facility]],PHONE:[(   )    -],FAX:[(   )    -]] FREE:[LAST:[PCP],FIRST:[PCP],PHONE:[(   )    -],FAX:[(   )    -]]

## 2021-11-23 NOTE — CHART NOTE - NSCHARTNOTEFT_GEN_A_CORE
Nursing staff notified primary team at 4:43pm regarding patient being unable to be accepted to [facility] as patient had no insurance from [facility]. Patient was previously from inpatient [facility]. Per nursing staff said they were notified "sometime in the afternoon". [place] the nursing staff [place] is [phone]. Made multiple attempts to contact [employer] regarding clarification, but no response. Dc order discontinued. Nursing staff notified primary team at 4:43pm regarding patient being unable to be accepted to [facility] as patient had no insurance from [place]. Patient was previously from inpatient [facility]. Per nursing staff said they were notified "sometime in the afternoon". Only number the nursing staff has is [phone]. Made multiple attempts to contact [facility] regarding clarification, but no response. Dc order discontinued.    Addendum: Nursing staff called with number from another  they spoke with at ext [phone]. Per , last note dropped by [facility] at 4pm stated that the patient's insurance was pending authorization as patient may have used all the days on their insurance. Will need to followup with insurance company regarding extension in AM.

## 2021-11-23 NOTE — BH CONSULTATION LIAISON PROGRESS NOTE - NSBHASSESSMENTFT_PSY_ALL_CORE
Pt is a 71 y/o AAF, domiciled at [facility], long hx of Schizophrenia with multiple past inpatient hospitalizations, last hospitalization started at [facility] 10/13/21, past hx of aggression towards staff and peers, no documented hx of SA, pertinent PMH includes HLD, HTN, Parkinson's dx. Seizure d/o, T2DM, Hypothyroidism, Glaucoma, PVD, Asthma, Bladder Ca. Pt was found unconscious on the floor at [facility] on 11/20/21, BIBEMS from [facility] for acute change in mental status.     Pt is currently being medically managed by the medicine and neurology team for AMS episode. CT head, CTA head and neck and CT perfusion has been done, grossly negative. R/o infectious and metabolic causes of AMS.     Pt care was discussed with Dr. Chung at [facility], patient has been manic, labile, tearful and hyperreligious, consistent with history of schizophrenia, currently decompensated. Pt currently lacks insight and is moderately disoriented with disorganized thought making her a harm to herself and others and requires continued inpatient hospitalization for monitoring and medication reconciliation. Pt is high risk of harm to others as she has a history of aggression and is currentl yin a state of elevated agitation - warrants low threshold for PRN and restraints. Current PRN Zyprexa 2.5mg q6hrs, with additional dose if pt lacks response. Pt seems to maintain some level of resistance to PRNs and may require repeaed dosing for control of agitation. Rx was restarted on psychiatric medications due to concern for psychiatric decompensation. Continue Risperidone 1.5mg BID. Continue Ativan 1mg BID.     Plan:  - CO 1:1 required due to patient lack of insight and extremely disorganized, risk to self and others - hx of aggression  	- LOW threshold for PRN usage for agitation due to history of aggression  	- Consider restraints if agitation escalates or aggression  - Continue orthostatic hypotension checks due to possible medication SE  - Continue Risperidone 1.5mg BID PO, cleared per primary and neurology team   -  Continue Ativan 1mg BID PO, HOLD for sedation and monitor pulse ox closely (was on 1mg TID at [facility])    - Continue Depakote 750mg qHS for seizures, would defer further titration if needed as per primary/neuro team        ***Monitor LFTs, Platelets, ammonia level and VPA level  -  Continue benztropine 0.5mg BID  - If patient agitated and QTc < 500, can use Zyprexa 2.5mg PO/IM q6h PRN per agitation regimen at [facility].   	- Additional 2.5mg Zyprexa PRN warranted if pt remains agitate following first dose  - Hold on restarting full regimen (trazodone, gabapentin), pending further medical optimization  - Continue neurology work-up for AMS episode, as per neurology team  - 2PC obtained, in pt chart    DO NOT GIVE IM Zyprexa within 2hrs of IM/IV ativan due to risk of respiratory depression Pt is a 71 y/o AAF, domiciled at [facility], long hx of Schizophrenia with multiple past inpatient hospitalizations, last hospitalization started at [facility] 10/13/21, past hx of aggression towards staff and peers, no documented hx of SA, pertinent PMH includes HLD, HTN, Parkinson's dx. Seizure d/o, T2DM, Hypothyroidism, Glaucoma, PVD, Asthma, Bladder Ca. Pt was found unconscious on the floor at [facility] on 11/20/21, BIBEMS from [facility] for acute change in mental status.     Pt is currently being medically managed by the medicine and neurology team for AMS episode. CT head, CTA head and neck and CT perfusion has been done, grossly negative. R/o infectious and metabolic causes of AMS.     Pt care was discussed with Dr. Chung at [facility] on 11/22, patient has been manic, labile, tearful and hyperreligious, consistent with history of schizophrenia, currently decompensated. Pt currently lacks insight, she is paranoid with extremely disorganized thoughts making her a harm to herself and others and requires continued inpatient hospitalization for monitoring and medication reconciliation. Pt has a history of aggression and she is currently in a state of elevated agitation - warrants low threshold for PRN and restraints. Current PRN Zyprexa 2.5mg q6hrs, with additional dose if pt lacks response. Rx was restarted on psychiatric medications due to concern for psychiatric decompensation. Continue Risperidone 1.5mg BID. Continue Ativan 1mg BID.     Plan:  - CO 1:1 required due to patient lack of insight and extremely disorganized, risk to self and others - hx of aggression  	- LOW threshold for PRN usage for agitation due to history of aggression  	- Consider restraints if agitation escalates or aggression  - Continue orthostatic hypotension checks due to possible medication SE  - Continue Risperidone 1.5mg BID PO  -  Continue Ativan 1mg BID PO, HOLD for sedation and monitor pulse ox closely (was on 1mg TID at [facility])    - Continue Depakote 750mg qHS for seizures, would defer further titration if needed as per primary/neuro team        ***Monitor LFTs, Platelets, ammonia level and VPA level  -  Continue benztropine 0.5mg BID  - If patient agitated and QTc < 500, can use Zyprexa 2.5mg PO/IM q6h PRN per agitation regimen at [facility].   	- Additional 2.5mg Zyprexa PRN warranted if pt remains agitate following first dose  - Hold on restarting full regimen (trazodone, gabapentin), pending further medical optimization  - Patient needs inpatient psychiatric admission, 2PC completed, in pt chart    DO NOT GIVE IM Zyprexa within 2hrs of IM/IV ativan due to risk of respiratory depression

## 2021-11-23 NOTE — BH CONSULTATION LIAISON PROGRESS NOTE - NSBHMSETHTPROC_PSY_A_CORE
Disorganized/Tangential/Illogical/Impaired reasoning Disorganized/Tangential/Perseverative/Illogical/Impaired reasoning

## 2021-11-23 NOTE — BH CONSULTATION LIAISON PROGRESS NOTE - NSBHMSESPABN_PSY_A_CORE
Slowed rate/Decreased productivity/Increased latency/Impaired articulation Loud volume/Decreased productivity/Increased latency/Impaired articulation

## 2021-11-23 NOTE — PROGRESS NOTE ADULT - PROBLEM SELECTOR PLAN 8
-Diet: passed dysphagia screen, however slow to swallow per RN. will start pureed diet  -VTE ppx: lovenox 40 mg subQ daily  -Dispo: [facility] once medically stable

## 2021-11-23 NOTE — PROGRESS NOTE ADULT - PROBLEM SELECTOR PLAN 1
Brought in by EMS for increasing lethargy i/s/o hypotension (SBP in 80s). Vitals stable, afebrile. A&Ox3 at baseline. No leukocytosis, UCx and BCx negative. CXR clear. CT Head, CT perfusion, CTA head and neck negative for acute pathology and significant stenosis. Lactate, ammonia, CK wnl. Unclear etiology. Likely medication induced    -no signs or symptoms of infection  -neuro exam normal  -at baseline mental status  -urine tox positive for benzodiazepines (Ativan PTA)  -neuro following: rec MR head without contrast  -EKG NSR without signs of ischemia  -TSH, B12, folate wnl  -f/u orthostatic vitals  -passed dysphagia screen in ED, will start pureed diet  -pt reports anisocuria is chronic   -per psych rec, will resume risperidal and Ativan 1 mg PO BID  -Continue to hold trazodone and gabapentin given deliriogenic properties in setting of change in mental status Brought in by EMS for increasing lethargy i/s/o hypotension (SBP in 80s). Vitals stable, afebrile. A&Ox3 at baseline. No leukocytosis, UCx and BCx negative. CXR clear. CT Head, CT perfusion, CTA head and neck negative for acute pathology and significant stenosis. Lactate, ammonia, CK wnl. Unclear etiology. Likely medication induced    -no signs or symptoms of infection  -neuro exam normal  -at baseline mental status  -urine tox positive for benzodiazepines (Ativan PTA)  -neuro following: rec MR head without contrast  -EKG NSR without signs of ischemia  -TSH, B12, folate wnl  -orthostatic vitals normal  -passed dysphagia screen in ED, will start pureed diet  -pt reports anisocuria is chronic   -per psych rec, will resume risperidal and Ativan 1 mg PO BID  -Continue to hold trazodone and gabapentin given deliriogenic properties in setting of change in mental status

## 2021-11-23 NOTE — DISCHARGE NOTE PROVIDER - NSDCFUADDAPPT_GEN_ALL_CORE_FT
Pt is being transferred to [facility].  Patient is being transferred to [facility]. Patient is being transferred to [facility].    Please follow with PCP and psychiatrist 2 weeks after discharge.

## 2021-11-23 NOTE — BH CONSULTATION LIAISON PROGRESS NOTE - NSBHFUPINTERVALHXFT_PSY_A_CORE
Patient agitated and screaming overnight. Given Zyprexa 2.5 mg PO and IMx2. Another Zyprexa 2.5mg IM this morning after our interview due to agitation. Pt continued to be agitated during interview today, standing guardedly against the wall, only wearing an isolation gown, yelling at the staff. She has paranoid thoughts, stating that we are all out to get her and want to harm her. She will not answer any questions, responding only with yelling about the staff coming to get her. No outright aggression noted, however escalation to aggression is very probable, especially w/ pt history of aggression. Unable to gain any other information during exam.    Pt received both Risperidone 1.5mg and Ativan 1mg doses yesterday and this morning. BP remains stable. Orthostatic pressures taken this AM were wnl. Still waiting for MR recommended by neurology. Patient agitated and screaming overnight. Given Zyprexa 2.5 mg PO and IMx2. Another Zyprexa 2.5mg IM this morning after our interview due to agitation. Pt continued to be agitated during interview today, standing guardedly against the wall, only wearing an isolation gown, yelling at the staff. She has paranoid thoughts, stating that we are all out to get her and want to harm her. She will not answer any questions, responding only with yelling about the staff coming to get her. No outright aggression noted, however escalation to aggression is very probable, especially w/ pt history of aggression. Unable to gain any other information during exam.    Pt received both Risperidone 1.5mg and Ativan 1mg doses yesterday and this morning. BP remains stable. Orthostatic pressures taken this AM were wnl.

## 2021-11-23 NOTE — DISCHARGE NOTE NURSING/CASE MANAGEMENT/SOCIAL WORK - PATIENT PORTAL LINK FT
You can access the FollowMyHealth Patient Portal offered by [facility] by registering at the following website: [email or web address]. By joining [facility]’s FollowMyHealth portal, you will also be able to view your health information using other applications (apps) compatible with our system.

## 2021-11-23 NOTE — PROGRESS NOTE ADULT - PROBLEM SELECTOR PLAN 5
Hgb a1c 5.6 on 10/15/21. On metformin at home    -f/u Hgb A1c  -monitor BG on BMP daily Hgb a1c 5.6 on 10/15/21. On metformin at home    -monitor BG on BMP daily

## 2021-11-23 NOTE — DISCHARGE NOTE PROVIDER - NSFOLLOWUPCLINICS_GEN_ALL_ED_FT
[facility]  Internal Medicine  [address] [place] [address]  Phone: [phone]  Fax: [phone]

## 2021-11-23 NOTE — BH CONSULTATION LIAISON PROGRESS NOTE - NSBHCHARTREVIEWVS_PSY_A_CORE FT
Vital Signs Last 24 Hrs  T(C): 36.3 (23 Nov 2021 06:47), Max: 36.9 (22 Nov 2021 15:39)  T(F): 97.4 (23 Nov 2021 06:47), Max: 98.5 (22 Nov 2021 15:39)  HR: 86 (23 Nov 2021 06:47) (71 - 106)  BP: 152/84 (23 Nov 2021 06:47) (129/77 - 152/84)  BP(mean): --  RR: 17 (23 Nov 2021 06:47) (17 - 20)  SpO2: 100% (23 Nov 2021 06:47) (93% - 100%)

## 2021-11-23 NOTE — PROGRESS NOTE ADULT - PROBLEM SELECTOR PLAN 2
Transferred from [facility]. Decompensated Schizophrenia    - per psych discussion with Dr. Chung at [facility], patient has been manic, labile, tearful and hyperreligious, consistent with history of schizophrenia, while at [facility]  - currently at baseline mental status. Continues with disorganized thought process, paranoia, Presybeterian preoccupations  - continue depakote 750mg qhs and Cogentin 0.5mg BID  - Per psych, holding trazodone, gabapentin, and Ativan given deliriogenic properties in setting of change in mental status  - Zyprexa 2.5mg PO/IM q6h PRN per agitation   - restart Risperdal 1.5mg BID due to concern for psychiatric decompensation   - restart Ativan 1 mg PO BID per psych to avoid withdrawal    Pt care was discussed with Dr. Chung at [facility], patient has been manic, labile, tearful and hyperreligious, consistent with history of schizophrenia, currently decompensated. Pt currently lacks insight and is moderately disoriented with disorganized thought making her a harm to herself and others and requires continued inpatient hospitalization for monitoring and medication reconciliation. Patient should be restarted on psychiatric medications due to concern for psychiatric decompensation. Transferred from [facility]. Decompensated Schizophrenia    - per psych discussion with Dr. Chung at [facility], patient has been manic, labile, tearful and hyperreligious, consistent with history of schizophrenia, while at [facility]  - currently at baseline mental status. Continues with disorganized thought process, paranoia, Anglican preoccupations  - continue depakote 750mg qhs and Cogentin 0.5mg BID  - - Zyprexa 2.5mg PO/IM q6h PRN per agitation   - restart Risperdal 1.5mg BID due to concern for psychiatric decompensation   - restart Ativan 1 mg PO BID per psych to avoid withdrawal  - pt will need inpatient psychiatric treatment for further stabilization per psych

## 2021-11-23 NOTE — DISCHARGE NOTE PROVIDER - NSDCCPTREATMENT_GEN_ALL_CORE_FT
PRINCIPAL PROCEDURE  Procedure: CT head wo con  Findings and Treatment: 11/21/21  HEAD CT: Stable ventriculomegaly and chronic microvascular disease. No acute intracranial hemorrhage or mass effect.  CT PERFUSION demonstrated: No core infarct. Small area of Tmax prolongation in the left occipital lobe is likely artifactual.  If symptoms persist consider follow up head CT or MRI, MRA  if no contraindication.  CTA COW:  Patent intracranial circulation without flow limiting stenosis.  CTA NECK: Patent, ECAs, ICAs, no  hemodynamically significant stenosis at  ICA origins by NASCET criteria.  Bilateral vertebral arteries are patent without flow limiting stenosis.

## 2021-11-24 LAB
GLUCOSE BLDC GLUCOMTR-MCNC: 120 MG/DL — HIGH (ref 70–99)
GLUCOSE BLDC GLUCOMTR-MCNC: 121 MG/DL — HIGH (ref 70–99)
SARS-COV-2 RNA SPEC QL NAA+PROBE: SIGNIFICANT CHANGE UP

## 2021-11-24 PROCEDURE — 99232 SBSQ HOSP IP/OBS MODERATE 35: CPT | Mod: GC

## 2021-11-24 PROCEDURE — 99233 SBSQ HOSP IP/OBS HIGH 50: CPT

## 2021-11-24 RX ORDER — OLANZAPINE 15 MG/1
5 TABLET, FILM COATED ORAL EVERY 6 HOURS
Refills: 0 | Status: DISCONTINUED | OUTPATIENT
Start: 2021-11-24 | End: 2021-11-29

## 2021-11-24 RX ORDER — OLANZAPINE 15 MG/1
2.5 TABLET, FILM COATED ORAL ONCE
Refills: 0 | Status: COMPLETED | OUTPATIENT
Start: 2021-11-24 | End: 2021-11-24

## 2021-11-24 RX ADMIN — LISINOPRIL 10 MILLIGRAM(S): 2.5 TABLET ORAL at 06:05

## 2021-11-24 RX ADMIN — Medication 1 APPLICATION(S): at 06:05

## 2021-11-24 RX ADMIN — Medication 1 MILLIGRAM(S): at 21:05

## 2021-11-24 RX ADMIN — Medication 1 APPLICATION(S): at 18:04

## 2021-11-24 RX ADMIN — OLANZAPINE 5 MILLIGRAM(S): 15 TABLET, FILM COATED ORAL at 23:09

## 2021-11-24 RX ADMIN — RISPERIDONE 1.5 MILLIGRAM(S): 4 TABLET ORAL at 06:06

## 2021-11-24 RX ADMIN — OLANZAPINE 2.5 MILLIGRAM(S): 15 TABLET, FILM COATED ORAL at 14:55

## 2021-11-24 RX ADMIN — ENOXAPARIN SODIUM 40 MILLIGRAM(S): 100 INJECTION SUBCUTANEOUS at 11:51

## 2021-11-24 RX ADMIN — Medication 25 MILLIGRAM(S): at 06:04

## 2021-11-24 RX ADMIN — OLANZAPINE 2.5 MILLIGRAM(S): 15 TABLET, FILM COATED ORAL at 16:48

## 2021-11-24 RX ADMIN — CARBIDOPA AND LEVODOPA 1 TABLET(S): 25; 100 TABLET ORAL at 06:02

## 2021-11-24 RX ADMIN — Medication 1 APPLICATION(S): at 06:06

## 2021-11-24 RX ADMIN — Medication 0.5 MILLIGRAM(S): at 06:03

## 2021-11-24 RX ADMIN — CARBIDOPA AND LEVODOPA 1 TABLET(S): 25; 100 TABLET ORAL at 21:09

## 2021-11-24 RX ADMIN — Medication 75 MICROGRAM(S): at 06:03

## 2021-11-24 RX ADMIN — Medication 1 MILLIGRAM(S): at 06:01

## 2021-11-24 RX ADMIN — DIVALPROEX SODIUM 750 MILLIGRAM(S): 500 TABLET, DELAYED RELEASE ORAL at 21:10

## 2021-11-24 RX ADMIN — CARBIDOPA AND LEVODOPA 1 TABLET(S): 25; 100 TABLET ORAL at 13:16

## 2021-11-24 RX ADMIN — OLANZAPINE 2.5 MILLIGRAM(S): 15 TABLET, FILM COATED ORAL at 11:51

## 2021-11-24 NOTE — BH CONSULTATION LIAISON PROGRESS NOTE - CASE SUMMARY
Chart reviewed, pt. seen and evaluated with REBECA Francisco, I agree with above assessment and plan, pt. somewhat more cooperative/engaging today, however overall she remains disorganized, labile, impulsive, intermittently agitated, delusional and psychotic. Denies SI and HI. Plan as above, pt. needs an inpatient psychiatric admission for stability/further treatment,  care coordinated with floor TRISH Abel, she requested me to speak with Dr. Jay at [facility] to facilitate the transfer, Case d/w Dr. Jay, he is requesting PT consult, will follow

## 2021-11-24 NOTE — PROGRESS NOTE ADULT - PROBLEM SELECTOR PLAN 7
Patient due for follow up appt.  Called patient to schedule.  No answer.  Left VM and sent letter.   c/w carbidopa/levodopa    -no rigidity on exam

## 2021-11-24 NOTE — PROGRESS NOTE ADULT - PROBLEM SELECTOR PLAN 8
-Diet: passed dysphagia screen, however slow to swallow per RN. will start pureed diet  -VTE ppx: lovenox 40 mg subQ daily  -Dispo: [facility] once medically stable -Diet: passed dysphagia screen, however slow to swallow per RN. will start pureed diet  -VTE ppx: lovenox 40 mg subQ daily  -Dispo:Pt is medically optimized for discharge. Pending acceptance by inpatient psych facility

## 2021-11-24 NOTE — CHART NOTE - NSCHARTNOTEFT_GEN_A_CORE
Paged by nurse as patient severely agitated, screaming and verbally harassing staff. Security was called. Patient did not respond to additional 2.5mg IM Zyprexa given at Paged by nurse as patient severely agitated, screaming and aggressive towards staff. Security was called. Patient did not respond to additional 2.5mg IM Zyprexa given at Paged by nurse as patient severely agitated, screaming and aggressive towards staff. Security was called. Patient given an additional 2.5 mg IM Zyprexa at 14:55 per psych rec but pt remained agitated. Psychiatry attending Dr. Kiki Centeno was contacted for additional recommendations. She recommended another 2.5 mg IM Zyprexa, increasing Zyprexa PRN to 5 mg q6h, and starting Ativan 1 mg IM/IV PRN for severe agitation. DO NOT GIVE IM Zyprexa within 2hrs of IM/IV ativan due to risk of respiratory depression        Rosa Estrada MD  PGY-1 Internal Medicine

## 2021-11-24 NOTE — PROGRESS NOTE ADULT - ATTENDING COMMENTS
70W PMH of HTN, T2DM, asthma, Parkinson's disease, seizure disorder, schizophrenia BIBEMS from [facility] for acute change in mental status and episode of hypotension. Stroke code was called in the ED for anisocoria, however this was discovered to be chronic finding. Hypotension and lethargy improved in the ED and admitted to medicine to rule out secondary causes.    # AMS - transient, no definitive inciting cause, suspect medication induced. Patient is on multiple sedative medications prior to arrival.  seen by neurology, racquel, appreciate recs. No need for MRI/EEG. Holding gabapentin and Trazadone. Ativan resumed at lower dose 1mg BID (from TID). Resumed other psychotropic meds given increased agitation/ hallucinations.  # Hypotension - Secondary workup unrevealing, likely medication induced   # Schizophrenia - decompensated and will need transfer back to Psych.  following appreciated recs. Would continue to reintroduce medications cautiously.

## 2021-11-24 NOTE — BH CONSULTATION LIAISON PROGRESS NOTE - NSBHMSESPABN_PSY_A_CORE
Loud volume/Decreased productivity/Increased latency/Impaired articulation Loud volume/Impaired articulation

## 2021-11-24 NOTE — BH CONSULTATION LIAISON PROGRESS NOTE - NSBHFUPINTERVALHXFT_PSY_A_CORE
Pt is calm, pleasant and cooperative on interview today. However, she remains questioning, persistent that she has a "connection with the lord". States that the lord sends her "signs" that are obviously for her, but says they are private and will not share. Denies hearing "gods" voice. Will not directly answer if she has an visual hallucinations. Pt remembers being agitated yesterday, stating it was because she "wanted to put her clothes on", which led to being restraining my hospital security. She remains unpredictable, today pleasant though. No rigidity noted on exam. She is A&Ox4 today, however cheats with glances at the hospital menu for hospital name and date. Pt began to open up about "meeting the lord" 2 years ago which is what allowed her to "speak to" her son. She states at one point that she "lost her son when he was 12" but then later states that he is not  but "seems different now". She has not received any PRNs since yesterday morning.    TRISH notified the team that due to insurance issues, pt will not be able to return to [facility]. SW coordinating for care to continue at an alternate inpatient facility, they have been contacts and awaiting response.    Pt received both Risperidone 1.5mg and Ativan 1mg doses yesterday and this morning. BP remains stable. Orthostatic pressures taken are wnl.  Pt is pleasant and cooperative on interview today. However, she remains disorganized and  questioning, persistent that she has a "connection with the lord". States that the lord sends her "signs" that are obviously for her, but says they are private and will not share. Denies hearing "gods" voice. Will not directly answer if she has an visual hallucinations. Pt remembers being agitated yesterday, stating it was because she "wanted to put her clothes on". She remains unpredictable, today pleasant though. No rigidity noted on exam. She is A&Ox3 today, however glances at the hospital menu for hospital name and date. Pt began to open up about "meeting the lord" 2 years ago which is what allowed her to "speak to" her son. She states at one point that she "lost her son when he was 12" but then later states that he is not  but "seems different now". She has not received any PRNs since yesterday morning.    SW notified the team that due to insurance issues, pt will not be able to return to [facility]. Floor SW coordinating for care to continue at an alternate inpatient psychiatric facility, they have been contacts and awaiting response.    Pt received both Risperidone 1.5mg and Ativan 1mg doses yesterday and this morning. BP remains stable. Orthostatic pressures taken are wnl.

## 2021-11-24 NOTE — BH CONSULTATION LIAISON PROGRESS NOTE - NSBHASSESSMENTFT_PSY_ALL_CORE
Pt is a 71 y/o AAF, domiciled at [facility], long hx of Schizophrenia with multiple past inpatient hospitalizations, last hospitalization started at [facility] 10/13/21, past hx of aggression towards staff and peers, no documented hx of SA, pertinent PMH includes HLD, HTN, Parkinson's dx. Seizure d/o, T2DM, Hypothyroidism, Glaucoma, PVD, Asthma, Bladder Ca. Pt was found unconscious on the floor at [facility] on 11/20/21, BIBEMS from [facility] for acute change in mental status.     Pt is currently being medically managed by the medicine and neurology team for AMS episode. CT head, CTA head and neck and CT perfusion has been done, grossly negative. R/o infectious and metabolic causes of AMS.     Pt care was discussed with Dr. Chung at [facility] on 11/22, patient has been manic, labile, tearful and hyperreligious, consistent with history of schizophrenia, currently decompensated. Pt currently lacks insight, she is paranoid with extremely disorganized thoughts making her a harm to herself and others and requires continued inpatient hospitalization for monitoring and medication reconciliation. Pt has a history of aggression. She is unpredictably agitated. Although cooperative today, history of agitation and aggression warrants continued low threshold for PRN and restraints. Current PRN Zyprexa 2.5mg q6hrs, with additional dose if pt lacks response. Rx was restarted on psychiatric medications due to concern for psychiatric decompensation. Continue Risperidone 1.5mg BID. Continue Ativan 1mg BID.     SW aware of insurance issues which does not allow pt to return to [facility] for inpatient care. As per [facility] note, awaiting response from other inpatient hospitals.     Plan:  - CO 1:1 required due to patient lack of insight and extremely disorganized, risk to self and others - hx of aggression  	- LOW threshold for PRN usage for agitation due to history of aggression  	- Consider restraints if agitation escalates or aggression  - Continue orthostatic hypotension checks due to possible medication SE  - Continue Risperidone 1.5mg BID PO  - Continue Ativan 1mg BID PO, HOLD for sedation and monitor pulse ox closely (was on 1mg TID at [facility])    - Continue Depakote 750mg qHS for seizures, would defer further titration if needed as per primary/neuro team        ***Monitor LFTs, Platelets, ammonia level and VPA level  - Continue benztropine 0.5mg BID  - If patient agitated and QTc < 500, can use Zyprexa 2.5mg PO/IM q6h PRN per agitation regimen at [facility].   	- Additional 2.5mg Zyprexa PRN warranted if pt remains agitate following first dose  - Hold on restarting full regimen (trazodone, gabapentin), pending further medical optimization  - Patient needs inpatient psychiatric admission, 2PC completed, in pt chart     - SW working on inpatient facility due to insurance issues    DO NOT GIVE IM Zyprexa within 2hrs of IM/IV ativan due to risk of respiratory depression Pt is a 71 y/o AAF, domiciled at [facility], long hx of Schizophrenia with multiple past inpatient hospitalizations, last hospitalization started at [facility] 10/13/21, past hx of aggression towards staff and peers, no documented hx of SA, pertinent PMH includes HLD, HTN, Parkinson's dx. Seizure d/o, T2DM, Hypothyroidism, Glaucoma, PVD, Asthma, Bladder Ca. Pt was found unconscious on the floor at [facility] on 11/20/21, BIBEMS from [facility] for acute change in mental status.     Pt is currently being medically managed by the medicine and neurology team for AMS episode. CT head, CTA head and neck and CT perfusion has been done, grossly negative. R/o infectious and metabolic causes of AMS.     Pt care was discussed with Dr. Chung at [facility] on 11/22, patient has been manic, labile, tearful and hyperreligious, consistent with history of schizophrenia, currently decompensated. Pt currently lacks insight, she is paranoid with extremely disorganized thoughts making her a harm to herself and others and requires continued inpatient hospitalization for monitoring and medication reconciliation. Pt has a history of aggression. She is unpredictable and impulsive. Although cooperative today, history of agitation and aggression warrants continued low threshold for PRN and restraints.   Current PRN Zyprexa 2.5mg q6hrs, with additional dose if pt lacks response. Rx was restarted on psychiatric medications due to concern for psychiatric decompensation. Continue Risperidone 1.5mg BID. Continue Ativan 1mg BID.     SW aware of insurance issues which does not allow pt to return to [facility] for inpatient care. As per [facility] note, awaiting response from other inpatient hospitals.     Plan:  - CO 1:1 required due to patient lack of insight and extremely disorganized, delusional, risk to self and others - hx of aggression  	- LOW threshold for PRN usage for agitation due to history of aggression  	- Consider restraints if agitation escalates or aggression  - Continue orthostatic hypotension checks   - Continue Risperidone 1.5mg BID PO  - Continue Ativan 1mg BID PO, HOLD for sedation and monitor pulse ox closely (was on 1mg TID at [facility])    - Continue Depakote 750mg qHS for seizures, would defer further titration if needed as per primary/neuro team        ***Monitor LFTs, Platelets, ammonia level and VPA level  - Continue benztropine 0.5mg BID  - If patient agitated and QTc < 500, can use Zyprexa 2.5mg PO/IM q6h PRN per agitation regimen at [facility].   	- Additional 2.5mg Zyprexa PRN warranted if pt remains agitate following first dose  - Hold on restarting full regimen (trazodone, gabapentin) due to recent acute medical issues (AMS) and to avoid polypharmacy    - Patient needs inpatient psychiatric admission, 2PC completed, in pt chart     - SW working on other inpatient psychiatric facility due to insurance issues    DO NOT GIVE IM Zyprexa within 2hrs of IM/IV ativan due to risk of respiratory depression

## 2021-11-24 NOTE — PHYSICAL THERAPY INITIAL EVALUATION ADULT - PATIENT PROFILE REVIEW, REHAB EVAL
PT orders received: ambulate with assistance. Consult with RN , pt may participate in PT evaluation./yes

## 2021-11-24 NOTE — PHYSICAL THERAPY INITIAL EVALUATION ADULT - ADDITIONAL COMMENTS
Patient is a poor historian. Per chart review patient admitted from [facility]. Prior to, pt from [facility].  Patient was independent in ADLs and ambulation prior to admission.    Patient was left sitting at edge of bed, 1:1 at bedside, all lines/tubes intact and call bell within reach, RN aware

## 2021-11-24 NOTE — BH CONSULTATION LIAISON PROGRESS NOTE - NSBHMSETHTPROC_PSY_A_CORE
Disorganized/Tangential/Perseverative/Impaired reasoning Disorganized/Tangential/Perseverative/Illogical/Impaired reasoning

## 2021-11-24 NOTE — PHYSICAL THERAPY INITIAL EVALUATION ADULT - GENERAL OBSERVATIONS, REHAB EVAL
Patient was received semi-supine in bed in NAD. Patient was received standing at doorway in [facility].

## 2021-11-24 NOTE — PROGRESS NOTE ADULT - ASSESSMENT
71 yo F with PMH of HTN, T2DM, asthma, seizure disorder, Parkinson's disease, glaucoma, schizophrenia, hemorrhoids BIBEMS from [facility] for acute change in mental status. CT head, CTA heach and neck and CT perfusion negative for acute pathology. R/o infectious and metabolic causes of AMS 71 yo F with PMH of HTN, T2DM, asthma, seizure disorder, Parkinson's disease, glaucoma, schizophrenia, hemorrhoids BIBEMS from [facility] for acute change in mental status i/s/o hypotension. Likely medication-induced given negative brain imaging and negative metabolic and infectious work up

## 2021-11-24 NOTE — PHYSICAL THERAPY INITIAL EVALUATION ADULT - PERTINENT HX OF CURRENT PROBLEM, REHAB EVAL
Patient is a 70 year old female admitted to [facility] from [facility] for acute change in mental status. PMH of HTN, T2DM, asthma, seizure disorder, Parkinson's disease, glaucoma, schizophrenia.

## 2021-11-24 NOTE — BH CONSULTATION LIAISON PROGRESS NOTE - NSBHCHARTREVIEWVS_PSY_A_CORE FT
Vital Signs Last 24 Hrs  T(C): 36.4 (24 Nov 2021 05:48), Max: 37.3 (23 Nov 2021 18:25)  T(F): 97.6 (24 Nov 2021 05:48), Max: 99.1 (23 Nov 2021 18:25)  HR: 79 (24 Nov 2021 05:48) (67 - 95)  BP: 138/65 (24 Nov 2021 05:48) (107/61 - 138/65)  BP(mean): --  RR: 17 (24 Nov 2021 05:48) (17 - 18)  SpO2: 100% (24 Nov 2021 05:48) (100% - 100%)

## 2021-11-24 NOTE — PROGRESS NOTE ADULT - PROBLEM SELECTOR PLAN 2
Transferred from [facility]. Decompensated Schizophrenia    - per psych discussion with Dr. Chung at [facility], patient has been manic, labile, tearful and hyperreligious, consistent with history of schizophrenia, while at [facility]  - currently at baseline mental status. Continues with disorganized thought process, paranoia, Congregation preoccupations  - continue depakote 750mg qhs and Cogentin 0.5mg BID  - - Zyprexa 2.5mg PO/IM q6h PRN per agitation   - restart Risperdal 1.5mg BID due to concern for psychiatric decompensation   - restart Ativan 1 mg PO BID per psych to avoid withdrawal  - pt will need inpatient psychiatric treatment for further stabilization per psych Transferred from [facility]. Decompensated Schizophrenia    - per psych discussion with Dr. Chung at [facility], patient has been manic, labile, tearful and hyperreligious, consistent with history of schizophrenia, while at [facility]  - currently at baseline mental status. Continues with disorganized thought process, paranoia, Jain preoccupations  - continue depakote 750mg qhs and Cogentin 0.5mg BID  - - Zyprexa 2.5mg PO/IM q6h PRN per agitation   - restart Risperdal 1.5mg BID due to concern for psychiatric decompensation   - restart Ativan 1 mg PO BID per psych to avoid withdrawal  - psych rec continuing to hold trazodone and gabapentin due to recent AMS and to avoid polypharmacy    - pt will need inpatient psychiatric treatment for further stabilization per psych

## 2021-11-24 NOTE — PROGRESS NOTE ADULT - SUBJECTIVE AND OBJECTIVE BOX
PROGRESS NOTE:   Authored by Dr. Rosa Estrada MD (PGY-1). Pager [facility] [phone]/ [facility]     Patient is a 70y old  Female who presents with a chief complaint of AMS (23 Nov 2021 13:45)      SUBJECTIVE / OVERNIGHT EVENTS:  No acute events overnight.     ADDITIONAL REVIEW OF SYSTEMS:  Patient denies fevers, chills, chest pain, shortness of breath, nausea, abdominal pain, diarrhea, constipation, dysuria, leg swelling, headache, light headedness.    MEDICATIONS  (STANDING):  ammonium lactate   12% Cream 1 Application(s) Topical two times a day  benztropine 0.5 milliGRAM(s) Oral two times a day  carbidopa/levodopa  25/100 1 Tablet(s) Oral three times a day  diVALproex Sprinkle 750 milliGRAM(s) Oral at bedtime  enoxaparin Injectable 40 milliGRAM(s) SubCutaneous daily  hydrocortisone 1% Cream 1 Application(s) Topical every 12 hours  influenza  Vaccine (HIGH DOSE) 0.7 milliLiter(s) IntraMuscular once  lactulose Syrup 10 Gram(s) Oral at bedtime  levothyroxine 75 MICROGram(s) Oral daily  lisinopril 10 milliGRAM(s) Oral daily  LORazepam     Tablet 1 milliGRAM(s) Oral two times a day  metoprolol tartrate 25 milliGRAM(s) Oral two times a day  risperiDONE   Tablet 1.5 milliGRAM(s) Oral two times a day  senna 1 Tablet(s) Oral two times a day    MEDICATIONS  (PRN):  OLANZapine Injectable 2.5 milliGRAM(s) IntraMuscular every 6 hours PRN agitation      CAPILLARY BLOOD GLUCOSE      POCT Blood Glucose.: 85 mg/dL (23 Nov 2021 22:15)  POCT Blood Glucose.: 72 mg/dL (23 Nov 2021 12:48)    I&O's Summary      PHYSICAL EXAM:  Vital Signs Last 24 Hrs  T(C): 36.4 (24 Nov 2021 05:48), Max: 37.3 (23 Nov 2021 18:25)  T(F): 97.6 (24 Nov 2021 05:48), Max: 99.1 (23 Nov 2021 18:25)  HR: 79 (24 Nov 2021 05:48) (67 - 95)  BP: 138/65 (24 Nov 2021 05:48) (107/61 - 138/65)  BP(mean): --  RR: 17 (24 Nov 2021 05:48) (17 - 18)  SpO2: 100% (24 Nov 2021 05:48) (100% - 100%)    CONSTITUTIONAL: NAD, well-developed  HEET: MMM, EOMI, PERRLA  NECK: supple  RESPIRATORY: Normal respiratory effort; lungs are clear to auscultation bilaterally  CARDIOVASCULAR: Regular rate and rhythm, normal S1 and S2, no murmur/rub/gallop; No lower extremity edema; Peripheral pulses are 2+ bilaterally  ABDOMEN: Nontender to palpation, normoactive bowel sounds, no rebound/guarding; No hepatosplenomegaly  MUSCULOSKELETAL: no clubbing or cyanosis of digits; no joint swelling or tenderness to palpation  NEURO: Moving all four extremities, sensation grossly intact  PSYCH: A+O to person, place, and time; affect appropriate  SKIN: No rash    LABS:                        11.7   7.90  )-----------( 243      ( 22 Nov 2021 08:33 )             36.2     11-22    141  |  108<H>  |  13  ----------------------------<  104<H>  4.0   |  23  |  0.74    Ca    9.6      22 Nov 2021 08:33  Phos  3.2     11-22  Mg     1.90     11-22                Culture - Blood (collected 21 Nov 2021 16:27)  Source: .Blood Blood-Peripheral  Preliminary Report (22 Nov 2021 17:02):    No growth to date.    Culture - Blood (collected 21 Nov 2021 16:27)  Source: .Blood Blood-Peripheral  Preliminary Report (22 Nov 2021 17:02):    No growth to date.        Tele Reviewed:    RADIOLOGY & ADDITIONAL TESTS:  Results Reviewed:   Imaging Personally Reviewed:  Electrocardiogram Personally Reviewed:     PROGRESS NOTE:   Authored by Dr. Rosa Estrada MD (PGY-1). Pager [facility] [phone]/ [facility]     Patient is a 70y old  Female who presents with a chief complaint of AMS (23 Nov 2021 13:45)      SUBJECTIVE / OVERNIGHT EVENTS:  Pt unable to be transferred to [facility] yesterday due to insurance issues.    Pt states she wants to leave this morning and requesting to change out of gown into her clothes    ADDITIONAL REVIEW OF SYSTEMS:  Patient denies fevers, chills, chest pain, shortness of breath, nausea, abdominal pain, diarrhea, constipation, dysuria, leg swelling, headache, light headedness.    MEDICATIONS  (STANDING):  ammonium lactate   12% Cream 1 Application(s) Topical two times a day  benztropine 0.5 milliGRAM(s) Oral two times a day  carbidopa/levodopa  25/100 1 Tablet(s) Oral three times a day  diVALproex Sprinkle 750 milliGRAM(s) Oral at bedtime  enoxaparin Injectable 40 milliGRAM(s) SubCutaneous daily  hydrocortisone 1% Cream 1 Application(s) Topical every 12 hours  influenza  Vaccine (HIGH DOSE) 0.7 milliLiter(s) IntraMuscular once  lactulose Syrup 10 Gram(s) Oral at bedtime  levothyroxine 75 MICROGram(s) Oral daily  lisinopril 10 milliGRAM(s) Oral daily  LORazepam     Tablet 1 milliGRAM(s) Oral two times a day  metoprolol tartrate 25 milliGRAM(s) Oral two times a day  risperiDONE   Tablet 1.5 milliGRAM(s) Oral two times a day  senna 1 Tablet(s) Oral two times a day    MEDICATIONS  (PRN):  OLANZapine Injectable 2.5 milliGRAM(s) IntraMuscular every 6 hours PRN agitation      CAPILLARY BLOOD GLUCOSE      POCT Blood Glucose.: 85 mg/dL (23 Nov 2021 22:15)  POCT Blood Glucose.: 72 mg/dL (23 Nov 2021 12:48)    I&O's Summary      PHYSICAL EXAM:  Vital Signs Last 24 Hrs  T(C): 36.4 (24 Nov 2021 05:48), Max: 37.3 (23 Nov 2021 18:25)  T(F): 97.6 (24 Nov 2021 05:48), Max: 99.1 (23 Nov 2021 18:25)  HR: 79 (24 Nov 2021 05:48) (67 - 95)  BP: 138/65 (24 Nov 2021 05:48) (107/61 - 138/65)  BP(mean): --  RR: 17 (24 Nov 2021 05:48) (17 - 18)  SpO2: 100% (24 Nov 2021 05:48) (100% - 100%)    CONSTITUTIONAL: NAD, well-developed  HEENT: blown right pupil, left pupil reactive to light, MMM, EOMI, PERRLA  NECK: supple  RESPIRATORY: Normal respiratory effort; lungs are clear to auscultation bilaterally  CARDIOVASCULAR: Regular rate and rhythm, normal S1 and S2, no murmur/rub/gallop; No lower extremity edema; Peripheral pulses are 2+ bilaterally  ABDOMEN: Nontender to palpation, normoactive bowel sounds, no rebound/guarding; No hepatosplenomegaly. External hemorrhoid present  MUSCULOSKELETAL: no clubbing or cyanosis of digits; no joint swelling or tenderness to palpation  NEURO: Strength 5/5 in all extremities, no rigidity, no facial asymmetry. Moving all four extremities, sensation grossly intact  PSYCH: Oriented to person. States she is at [facility]. States date is Dec 2022. Disorganized thought process but redirectable this morning. Poor insight, paranoid, Gnosticist preoccupations, difficult to obtain additional information  SKIN: No rash    LABS:                        11.7   7.90  )-----------( 243      ( 22 Nov 2021 08:33 )             36.2     11-22    141  |  108<H>  |  13  ----------------------------<  104<H>  4.0   |  23  |  0.74    Ca    9.6      22 Nov 2021 08:33  Phos  3.2     11-22  Mg     1.90     11-22                Culture - Blood (collected 21 Nov 2021 16:27)  Source: .Blood Blood-Peripheral  Preliminary Report (22 Nov 2021 17:02):    No growth to date.    Culture - Blood (collected 21 Nov 2021 16:27)  Source: .Blood Blood-Peripheral  Preliminary Report (22 Nov 2021 17:02):    No growth to date.        Tele Reviewed:    RADIOLOGY & ADDITIONAL TESTS:  Results Reviewed:   Imaging Personally Reviewed:  Electrocardiogram Personally Reviewed:     PROGRESS NOTE:   Authored by Dr. Rosa Estrada MD (PGY-1). Pager [facility] [phone]/ [facility]     Patient is a 70y old  Female who presents with a chief complaint of AMS (23 Nov 2021 13:45)      SUBJECTIVE / OVERNIGHT EVENTS:  Pt unable to be transferred to [facility] yesterday due to insurance issues.    Pt states she wants to leave this morning and requesting to change out of gown into her clothes    ADDITIONAL REVIEW OF SYSTEMS:  Patient denies fevers, chills, chest pain, shortness of breath, nausea, abdominal pain, diarrhea, constipation, dysuria, leg swelling, headache, light headedness.    MEDICATIONS  (STANDING):  ammonium lactate   12% Cream 1 Application(s) Topical two times a day  benztropine 0.5 milliGRAM(s) Oral two times a day  carbidopa/levodopa  25/100 1 Tablet(s) Oral three times a day  diVALproex Sprinkle 750 milliGRAM(s) Oral at bedtime  enoxaparin Injectable 40 milliGRAM(s) SubCutaneous daily  hydrocortisone 1% Cream 1 Application(s) Topical every 12 hours  influenza  Vaccine (HIGH DOSE) 0.7 milliLiter(s) IntraMuscular once  lactulose Syrup 10 Gram(s) Oral at bedtime  levothyroxine 75 MICROGram(s) Oral daily  lisinopril 10 milliGRAM(s) Oral daily  LORazepam     Tablet 1 milliGRAM(s) Oral two times a day  metoprolol tartrate 25 milliGRAM(s) Oral two times a day  risperiDONE   Tablet 1.5 milliGRAM(s) Oral two times a day  senna 1 Tablet(s) Oral two times a day    MEDICATIONS  (PRN):  OLANZapine Injectable 2.5 milliGRAM(s) IntraMuscular every 6 hours PRN agitation      CAPILLARY BLOOD GLUCOSE      POCT Blood Glucose.: 85 mg/dL (23 Nov 2021 22:15)  POCT Blood Glucose.: 72 mg/dL (23 Nov 2021 12:48)    I&O's Summary      PHYSICAL EXAM:  Vital Signs Last 24 Hrs  T(C): 36.4 (24 Nov 2021 05:48), Max: 37.3 (23 Nov 2021 18:25)  T(F): 97.6 (24 Nov 2021 05:48), Max: 99.1 (23 Nov 2021 18:25)  HR: 79 (24 Nov 2021 05:48) (67 - 95)  BP: 138/65 (24 Nov 2021 05:48) (107/61 - 138/65)  BP(mean): --  RR: 17 (24 Nov 2021 05:48) (17 - 18)  SpO2: 100% (24 Nov 2021 05:48) (100% - 100%)    CONSTITUTIONAL: NAD, well-developed  HEENT: blown right pupil, left pupil reactive to light, MMM, EOMI, PERRLA  NECK: supple  RESPIRATORY: Normal respiratory effort; lungs are clear to auscultation bilaterally  CARDIOVASCULAR: Regular rate and rhythm, normal S1 and S2, no murmur/rub/gallop; No lower extremity edema; Peripheral pulses are 2+ bilaterally  ABDOMEN: Nontender to palpation, normoactive bowel sounds, no rebound/guarding; No hepatosplenomegaly. External hemorrhoid present  MUSCULOSKELETAL: no clubbing or cyanosis of digits; no joint swelling or tenderness to palpation  NEURO: Strength 5/5 in all extremities, no rigidity, no facial asymmetry. Moving all four extremities, sensation grossly intact  PSYCH: Oriented to person. States she is at [facility]. States date is Dec 2022. Disorganized thought process but redirectable this morning. Poor insight, paranoid, Samaritan preoccupations, difficult to obtain additional information. Denies SI/HI, AVH  SKIN: No rash    LABS:                        11.7   7.90  )-----------( 243      ( 22 Nov 2021 08:33 )             36.2     11-22    141  |  108<H>  |  13  ----------------------------<  104<H>  4.0   |  23  |  0.74    Ca    9.6      22 Nov 2021 08:33  Phos  3.2     11-22  Mg     1.90     11-22                Culture - Blood (collected 21 Nov 2021 16:27)  Source: .Blood Blood-Peripheral  Preliminary Report (22 Nov 2021 17:02):    No growth to date.    Culture - Blood (collected 21 Nov 2021 16:27)  Source: .Blood Blood-Peripheral  Preliminary Report (22 Nov 2021 17:02):    No growth to date.        Tele Reviewed:    RADIOLOGY & ADDITIONAL TESTS:  Results Reviewed:   Imaging Personally Reviewed:  Electrocardiogram Personally Reviewed:

## 2021-11-24 NOTE — PROGRESS NOTE ADULT - PROBLEM SELECTOR PLAN 1
Brought in by EMS for increasing lethargy i/s/o hypotension (SBP in 80s). Vitals stable, afebrile. A&Ox3 at baseline. No leukocytosis, UCx and BCx negative. CXR clear. CT Head, CT perfusion, CTA head and neck negative for acute pathology and significant stenosis. Lactate, ammonia, CK wnl. Unclear etiology. Likely medication induced    -no signs or symptoms of infection  -neuro exam normal  -at baseline mental status  -urine tox positive for benzodiazepines (Ativan PTA)  -neuro following: rec MR head without contrast  -EKG NSR without signs of ischemia  -TSH, B12, folate wnl  -orthostatic vitals normal  -passed dysphagia screen in ED, will start pureed diet  -pt reports anisocuria is chronic   -per psych rec, will resume risperidal and Ativan 1 mg PO BID  -Continue to hold trazodone and gabapentin given deliriogenic properties in setting of change in mental status Brought in by EMS for increasing lethargy i/s/o hypotension (SBP in 80s). Vitals stable, afebrile. A&Ox3 at baseline. No leukocytosis, UCx and BCx negative. CXR clear. CT Head, CT perfusion, CTA head and neck negative for acute pathology and significant stenosis. Lactate, ammonia, CK wnl. Unclear etiology. Likely medication induced    -no signs or symptoms of infection  -neuro exam normal  -at baseline mental status  -urine tox positive for benzodiazepines (Ativan PTA)  -neuro following: rec MR head without contrast  -EKG NSR without signs of ischemia  -TSH, B12, folate wnl  -orthostatic vitals normal  -passed dysphagia screen in ED, will start pureed diet  -pt reports anisocuria is chronic   -per psych rec, will resume risperidal and Ativan 1 mg PO BID  -ppsych rec continue to hold trazodone and gabapentin due to recent AMS and polypharmacy

## 2021-11-25 LAB
ANION GAP SERPL CALC-SCNC: 11 MMOL/L — SIGNIFICANT CHANGE UP (ref 7–14)
BUN SERPL-MCNC: 16 MG/DL — SIGNIFICANT CHANGE UP (ref 7–23)
CALCIUM SERPL-MCNC: 9.9 MG/DL — SIGNIFICANT CHANGE UP (ref 8.4–10.5)
CHLORIDE SERPL-SCNC: 109 MMOL/L — HIGH (ref 98–107)
CO2 SERPL-SCNC: 24 MMOL/L — SIGNIFICANT CHANGE UP (ref 22–31)
CREAT SERPL-MCNC: 0.78 MG/DL — SIGNIFICANT CHANGE UP (ref 0.5–1.3)
GLUCOSE BLDC GLUCOMTR-MCNC: 137 MG/DL — HIGH (ref 70–99)
GLUCOSE BLDC GLUCOMTR-MCNC: 97 MG/DL — SIGNIFICANT CHANGE UP (ref 70–99)
GLUCOSE SERPL-MCNC: 99 MG/DL — SIGNIFICANT CHANGE UP (ref 70–99)
HCT VFR BLD CALC: 38.8 % — SIGNIFICANT CHANGE UP (ref 34.5–45)
HGB BLD-MCNC: 12.1 G/DL — SIGNIFICANT CHANGE UP (ref 11.5–15.5)
MAGNESIUM SERPL-MCNC: 1.9 MG/DL — SIGNIFICANT CHANGE UP (ref 1.6–2.6)
MCHC RBC-ENTMCNC: 27.3 PG — SIGNIFICANT CHANGE UP (ref 27–34)
MCHC RBC-ENTMCNC: 31.2 GM/DL — LOW (ref 32–36)
MCV RBC AUTO: 87.6 FL — SIGNIFICANT CHANGE UP (ref 80–100)
NRBC # BLD: 0 /100 WBCS — SIGNIFICANT CHANGE UP
NRBC # FLD: 0 K/UL — SIGNIFICANT CHANGE UP
PHOSPHATE SERPL-MCNC: 3.9 MG/DL — SIGNIFICANT CHANGE UP (ref 2.5–4.5)
PLATELET # BLD AUTO: 211 K/UL — SIGNIFICANT CHANGE UP (ref 150–400)
POTASSIUM SERPL-MCNC: 4.1 MMOL/L — SIGNIFICANT CHANGE UP (ref 3.5–5.3)
POTASSIUM SERPL-SCNC: 4.1 MMOL/L — SIGNIFICANT CHANGE UP (ref 3.5–5.3)
RBC # BLD: 4.43 M/UL — SIGNIFICANT CHANGE UP (ref 3.8–5.2)
RBC # FLD: 15.4 % — HIGH (ref 10.3–14.5)
SODIUM SERPL-SCNC: 144 MMOL/L — SIGNIFICANT CHANGE UP (ref 135–145)
WBC # BLD: 4.96 K/UL — SIGNIFICANT CHANGE UP (ref 3.8–10.5)
WBC # FLD AUTO: 4.96 K/UL — SIGNIFICANT CHANGE UP (ref 3.8–10.5)

## 2021-11-25 PROCEDURE — 99232 SBSQ HOSP IP/OBS MODERATE 35: CPT | Mod: GC

## 2021-11-25 RX ORDER — QUETIAPINE FUMARATE 200 MG/1
50 TABLET, FILM COATED ORAL ONCE
Refills: 0 | Status: COMPLETED | OUTPATIENT
Start: 2021-11-25 | End: 2021-11-25

## 2021-11-25 RX ORDER — OLANZAPINE 15 MG/1
5 TABLET, FILM COATED ORAL ONCE
Refills: 0 | Status: COMPLETED | OUTPATIENT
Start: 2021-11-25 | End: 2021-11-25

## 2021-11-25 RX ORDER — QUETIAPINE FUMARATE 200 MG/1
50 TABLET, FILM COATED ORAL EVERY 6 HOURS
Refills: 0 | Status: DISCONTINUED | OUTPATIENT
Start: 2021-11-25 | End: 2021-12-04

## 2021-11-25 RX ADMIN — QUETIAPINE FUMARATE 50 MILLIGRAM(S): 200 TABLET, FILM COATED ORAL at 21:13

## 2021-11-25 RX ADMIN — LACTULOSE 10 GRAM(S): 10 SOLUTION ORAL at 21:49

## 2021-11-25 RX ADMIN — Medication 1 MILLIGRAM(S): at 21:49

## 2021-11-25 RX ADMIN — OLANZAPINE 5 MILLIGRAM(S): 15 TABLET, FILM COATED ORAL at 15:49

## 2021-11-25 RX ADMIN — Medication 25 MILLIGRAM(S): at 18:19

## 2021-11-25 RX ADMIN — Medication 1 APPLICATION(S): at 18:20

## 2021-11-25 RX ADMIN — QUETIAPINE FUMARATE 50 MILLIGRAM(S): 200 TABLET, FILM COATED ORAL at 00:34

## 2021-11-25 RX ADMIN — OLANZAPINE 5 MILLIGRAM(S): 15 TABLET, FILM COATED ORAL at 04:27

## 2021-11-25 RX ADMIN — Medication 0.5 MILLIGRAM(S): at 18:18

## 2021-11-25 RX ADMIN — SENNA PLUS 1 TABLET(S): 8.6 TABLET ORAL at 18:26

## 2021-11-25 RX ADMIN — Medication 1 MILLIGRAM(S): at 01:42

## 2021-11-25 RX ADMIN — DIVALPROEX SODIUM 750 MILLIGRAM(S): 500 TABLET, DELAYED RELEASE ORAL at 21:50

## 2021-11-25 RX ADMIN — Medication 1 MILLIGRAM(S): at 06:59

## 2021-11-25 RX ADMIN — RISPERIDONE 1.5 MILLIGRAM(S): 4 TABLET ORAL at 18:19

## 2021-11-25 RX ADMIN — QUETIAPINE FUMARATE 50 MILLIGRAM(S): 200 TABLET, FILM COATED ORAL at 13:51

## 2021-11-25 RX ADMIN — CARBIDOPA AND LEVODOPA 1 TABLET(S): 25; 100 TABLET ORAL at 13:51

## 2021-11-25 RX ADMIN — CARBIDOPA AND LEVODOPA 1 TABLET(S): 25; 100 TABLET ORAL at 22:14

## 2021-11-25 NOTE — BH CONSULTATION LIAISON PROGRESS NOTE - OTHER
superficially cooperative at first, then irritable/uncooperative has h/o Parkinson's in 4 pt soft restraints impaired, improving

## 2021-11-25 NOTE — PROGRESS NOTE ADULT - PROBLEM SELECTOR PLAN 8
-Diet: passed dysphagia screen, however slow to swallow per RN. will start pureed diet  -VTE ppx: lovenox 40 mg subQ daily  -Dispo:Pt is medically optimized for discharge. Pending acceptance by inpatient psych facility

## 2021-11-25 NOTE — BH CONSULTATION LIAISON PROGRESS NOTE - NSBHFUPINTERVALHXFT_PSY_A_CORE
Overnight, patient with episode of agitation requiring Zyprexa 5 IM. Patient with continued agitation, failed to maintain behavioral control off restraints per nursing/staff (spitting at staff, attempting to throw things at staff). Continues to refuse most standing meds including Risperdal.    On interview, patient is initially calm and pleasant with writer, amanda. Becomes irritable when discussing behavioral issues then begins yelling at writer to remove patient's restraints "or I'm going to bust out of these things!" Continues to express paranoia towards [facility] staff as well as towards sister, noting others are "threatening me" but declines elaborating. Denies SIIP, HI/VI, AVH.

## 2021-11-25 NOTE — BH CONSULTATION LIAISON PROGRESS NOTE - NSBHCHARTREVIEWVS_PSY_A_CORE FT
Vital Signs Last 24 Hrs  T(C): 36.5 (25 Nov 2021 10:30), Max: 36.7 (24 Nov 2021 13:11)  T(F): 97.7 (25 Nov 2021 10:30), Max: 98.1 (24 Nov 2021 13:11)  HR: 93 (25 Nov 2021 10:30) (71 - 94)  BP: 156/90 (25 Nov 2021 10:30) (132/62 - 160/67)  BP(mean): --  RR: 17 (25 Nov 2021 10:30) (17 - 18)  SpO2: 100% (25 Nov 2021 10:30) (93% - 100%)

## 2021-11-25 NOTE — CHART NOTE - NSCHARTNOTEFT_GEN_A_CORE
Code Coleman called. Patient agitated and combative, screaming profanities, lashing out towards staff. Per nursing, pt tried to jump on PCA prior to code grey being called. Security at bedside. Patient screaming about "her babies," opening legs and stating her baby is coming out. Per night team, patient extremely agitated all night, impulsive and unpredictable. I spoke with emergency psych attending on call who recommended alternating Seroquel, Ativan and Zyprexa and holding Ativan if not having any effect due to deliriogenic properties that may be worsening agitation. Will place 4 point restraints as pt is Mecca Coleman called at about 6:30 AM. Patient agitated and combative, screaming profanities, lashing out towards staff. Per nursing, pt tried to jump on PCA prior to code grey being called. Security at bedside. Patient screaming about "her babies," opening legs and stating her baby is coming out. Per night team, patient extremely agitated all night, impulsive and unpredictable. I spoke with emergency psych attending on call who recommended alternating Seroquel, Ativan and Zyprexa and holding Ativan if not having any effect due to deliriogenic properties that may be worsening agitation. Will place 4 point restraints as pt is Mecca Coleman called at about 6:30 AM. Patient agitated and combative, screaming profanities, lashing out towards staff. Per nursing, pt tried to jump on PCA prior to code grey being called. Security at bedside. Patient screaming about "her babies," opening legs and stating her baby is coming out. Per night team, patient extremely agitated all night, impulsive and unpredictable. I spoke with emergency psych attending on call who recommended alternating Seroquel, Ativan and Zyprexa and holding Ativan if not having any effect due to deliriogenic properties that may be worsening agitation. No Haldol due to Parkinson's disease. Will place 4 point restraints due to aggressive behavior towards staff.

## 2021-11-25 NOTE — CHART NOTE - NSCHARTNOTEFT_GEN_A_CORE
Called to bedside to see pt for agitation. Pt is standing on top of her bed and trying to get out, yelling at space, taking off her clothes, and rubbing her genital area. She is grabbing at nurses and shouting about how "no one will want to  her body." Recently received zyprexa 5 an hour ago and po ativan 3 hours ago. Per nursing, no significant improvement in agitation after receiving zyprexa and ativan. She remains on 1:1 and is fall risk given her agitation.    Discussed with psych. Will try po seroquel 50 and if does not take PO then will try for either additional zyprexa or ativan. Midnight: Called to bedside to see pt for agitation. Pt is standing on top of her bed and trying to get out, yelling at space, taking off her clothes, and rubbing her genital area. She is grabbing at nurses and shouting about how "no one will want to  her body." Recently received zyprexa 5 an hour ago and po ativan 3 hours ago. Per nursing, no significant improvement in agitation after receiving zyprexa and ativan. She remains on 1:1 and is fall risk given her agitation. Discussed with psych. Will try po seroquel 50 and if does not take PO then will try for either additional zyprexa or ativan.    Update 1 AM: Pt took po seroquel and slept for 15 minutes however is now agitated again and shouting. Will try IM ativan. Midnight: Called to bedside to see pt for agitation. Pt is standing on top of her bed and trying to get out, yelling at space, taking off her clothes, and rubbing her genital area. She is grabbing at nurses and shouting about how "no one will want to  her body." Recently received zyprexa 5 an hour ago and po ativan 3 hours ago. Per nursing, no significant improvement in agitation after receiving zyprexa and ativan. She remains on 1:1 and is fall risk given her agitation. Discussed with psych. Will try po seroquel 50 and if does not take PO then will try for either additional zyprexa or ativan.    Update 1 AM: Pt took po seroquel and slept for 15 minutes however is now agitated again and shouting. Pt climbed out of bed and ran into hallway so security was called. IM ativan 1mg was given 5 minutes ago. Pt now in bed with full security team on standby. Seems a little calmer but still very impulsive. Midnight: Called to bedside to see pt for agitation. Pt is standing on top of her bed and trying to get out, yelling at space, taking off her clothes, and rubbing her genital area. She is grabbing at nurses and shouting about how "no one will want to  her body." Recently received zyprexa 5 an hour ago and po ativan 3 hours ago. Per nursing, no significant improvement in agitation after receiving zyprexa and ativan. She remains on 1:1 and is fall risk given her agitation. Discussed with psych. Will try po seroquel 50 and if does not take PO then will try for either additional zyprexa or ativan.    Update 1 AM: Pt took po seroquel and slept for 15 minutes however is now agitated again and shouting. Pt climbed out of bed and ran into hallway so security was called. IM ativan 1mg was given 5 minutes ago. Pt now in bed with full security team on standby. Seems a little calmer but still very impulsive.    Update 4AM: Called to bedside to see pt. Per nursing she calmed down for roughly ~10 minutes after IM ativan was given but is now agitated again. Will order 5mg IM zyprexa. Midnight: Called to bedside to see pt for agitation. Pt is standing on top of her bed and trying to get out, yelling at space, taking off her clothes, and rubbing her genital area. She is grabbing at nurses and shouting about how "no one will want to  her body." Recently received zyprexa 5 an hour ago and po ativan 3 hours ago. Per nursing, no significant improvement in agitation after receiving zyprexa and ativan. She remains on 1:1 and is fall risk given her agitation. Discussed with psych. Will try po seroquel 50 and if does not take PO then will try for either additional zyprexa or ativan.    Update 1 AM: Pt took po seroquel and slept for 15 minutes however is now agitated again and shouting. Pt climbed out of bed and ran into hallway so security was called. IM ativan 1mg was given 5 minutes ago. Pt now in bed with full security team on standby. Seems a little calmer but still very impulsive.    Update 4AM: Called to bedside to see pt. Per nursing she calmed down for roughly ~10 minutes after IM ativan was given but is now agitated again. Will order 5mg IM zyprexa.    6AM: pt sleeping comfortably

## 2021-11-25 NOTE — BH CONSULTATION LIAISON PROGRESS NOTE - NSBHMSETHTCONTENT_PSY_A_CORE
Delusions/Ideas of reference/Ruminations
Delusions/Ideas of reference/Ruminations/Other
Delusions/Ideas of reference/Ruminations

## 2021-11-25 NOTE — PROGRESS NOTE ADULT - ATTENDING COMMENTS
Noted to be agiatated this AM Mecca Grey called at about 6:30 AM agitated and combative, screaming profanities, lashing out towards staff. Per nursing, pt tried to jump on PCA prior to code grey being called. Security at bedside. Patient screaming about "her babies," opening legs and stating her baby is coming out. Per night team, patient extremely agitated all night, impulsive and unpredictable. had to be placed on 4 point restraint. Improved with seroquel and ativan call who recommended alternating Seroquel, Ativan and Zyprexa and holding Ativan.

## 2021-11-25 NOTE — BH CONSULTATION LIAISON PROGRESS NOTE - NSBHCHARTREVIEWLAB_PSY_A_CORE FT
Complete Blood Count in AM (11.22.21 @ 08:33)   Nucleated RBC: 0 /100 WBCs   WBC Count: 7.90 K/uL   RBC Count: 4.17 M/uL   Hemoglobin: 11.7 g/dL   Hematocrit: 36.2 %   Mean Cell Volume: 86.8 fL   Mean Cell Hemoglobin: 28.1 pg   Mean Cell Hemoglobin Conc: 32.3 gm/dL   Red Cell Distrib Width: 15.9 %   Platelet Count - Automated: 243 K/uL   Nucleated RBC #: 0.00 K/uL     Basic Metabolic Panel in AM (11.22.21 @ 08:33)   Sodium, Serum: 141 mmol/L   Potassium, Serum: 4.0 mmol/L   Chloride, Serum: 108 mmol/L   Carbon Dioxide, Serum: 23 mmol/L   Anion Gap, Serum: 10 mmol/L   Blood Urea Nitrogen, Serum: 13 mg/dL   Creatinine, Serum: 0.74 mg/dL   Glucose, Serum: 104 mg/dL   Calcium, Total Serum: 9.6 mg/dL   eGFR if Non : 83
                    12.1    4.96 )----------------------( 211   (21:51)    ANC:   3.67 K/uL (21 00:14)    -----------------------         144  |  109  |  16       ---------------------<     (21:51)       4.1  |  24  |  0.78    -----------------------    LFTs & synthetic function labs:  AST/ALT/ALP/TBili/Albumin        (21:)  22 / 13 / 85 / 0.2 / 3.7    AST/ALT/ALP/TBili/Albumin        (21 00:14)  27 / <5 / 81 / <0.2 / 3.7      -----------------------    TSH: 3.14 uIU/mL (21:52)    B12 level: 875 pg/mL (21 13:50)    Folate level: 20.0 ng/mL *H* (21:50)    -----------------------    HgbA1c: 5.6 % (10-15-21 @ 11:)    LDL: 73 mg/dL (10-14-21 @ 09:38)    T mg/dL (10-14-21 @ 09:38)    -----------------------    (If in last 1 month)    NH3 level: 46 umol/L (21:50)    CK level: 122 U/L (21 00:14)    -----------------------
                      11.7   7.90  )-----------( 243      ( 22 Nov 2021 08:33 )             36.2     Basic Metabolic Panel in AM (11.22.21 @ 08:33)   Sodium, Serum: 141 mmol/L   Potassium, Serum: 4.0 mmol/L   Chloride, Serum: 108 mmol/L   Carbon Dioxide, Serum: 23 mmol/L   Anion Gap, Serum: 10 mmol/L   Blood Urea Nitrogen, Serum: 13 mg/dL   Creatinine, Serum: 0.74 mg/dL   Glucose, Serum: 104 mg/dL   Calcium, Total Serum: 9.6 mg/dL   eGFR if Non : 83

## 2021-11-25 NOTE — CHART NOTE - NSCHARTNOTEFT_GEN_A_CORE
Contacted by night float regarding recurrent agitation. Pt received IM zyprexa 5 one hour ago, remains agitated, attempting to get out of bed, unredirectable and refusing PO. Pt has received multiple IM zyprexa/ativan today with poor effect. Recommended 50 mg seroquel PO prn q6h if pt takes PO, otherwise continue alternating IM zyprexa/ativan (wait two hours between IM dosing). Avoid haldol due to patient's parkinson's disease.

## 2021-11-25 NOTE — PROGRESS NOTE ADULT - ASSESSMENT
69 yo F with PMH of HTN, T2DM, asthma, seizure disorder, Parkinson's disease, glaucoma, schizophrenia, hemorrhoids BIBEMS from [facility] for acute change in mental status i/s/o hypotension. Likely medication-induced given negative brain imaging and negative metabolic and infectious work up 69 yo F with PMH of HTN, T2DM, asthma, seizure disorder, Parkinson's disease, glaucoma, schizophrenia, hemorrhoids BIBEMS from [facility] for acute change in mental status i/s/o hypotension. Likely medication-induced given negative brain imaging and negative metabolic and infectious work up. Waiting for acceptance by inpatient psych facility

## 2021-11-25 NOTE — BH CONSULTATION LIAISON PROGRESS NOTE - NSBHASSESSMENTFT_PSY_ALL_CORE
Pt is a 69 y/o AAF, domiciled at [facility], long hx of Schizophrenia with multiple past inpatient hospitalizations, last hospitalization started at [facility] 10/13/21, past hx of aggression towards staff and peers, no documented hx of SA, pertinent PMH includes HLD, HTN, Parkinson's dx. Seizure d/o, T2DM, Hypothyroidism, Glaucoma, PVD, Asthma, Bladder Ca. Pt was found unconscious on the floor at [facility] on 11/20/21, BIBEMS from [facility] for acute change in mental status.     Pt is currently being medically managed by the medicine and neurology team for AMS episode. CT head, CTA head and neck and CT perfusion has been done, grossly negative. R/o infectious and metabolic causes of AMS.     Pt care was discussed with Dr. Chung at [facility] on 11/22, patient has been manic, labile, tearful and hyperreligious, consistent with history of schizophrenia, currently decompensated. Pt currently lacks insight, she is paranoid with extremely disorganized thoughts making her a harm to herself and others and requires continued inpatient hospitalization for monitoring and medication reconciliation. Pt has a history of aggression. She is unpredictable and impulsive. Although cooperative today, history of agitation and aggression warrants continued low threshold for PRN and restraints.   Current PRN Zyprexa 2.5mg q6hrs, with additional dose if pt lacks response. Rx was restarted on psychiatric medications due to concern for psychiatric decompensation. Continue Risperidone 1.5mg BID. Continue Ativan 1mg BID.     SW aware of insurance issues which does not allow pt to return to [facility] for inpatient care. As per [facility] note, awaiting response from other inpatient hospitals.    11/25-continues to display poor behavioral control, code grey overnight. Refusing PO meds.    Plan:  - CO 1:1 required due to patient lack of insight and extremely disorganized, delusional, risk to self and others - hx of aggression  	- LOW threshold for PRN usage for agitation due to history of aggression  	- Consider restraints if agitation escalates or aggression  - Continue orthostatic hypotension checks   - Continue Risperidone 1.5mg BID PO  - Continue Ativan 1mg BID PO, HOLD for sedation and monitor pulse ox closely (was on 1mg TID at [facility])    - Continue Depakote 750mg qHS for seizures, would defer further titration if needed as per primary/neuro team        ***Monitor LFTs, Platelets, ammonia level and VPA level  - Continue benztropine 0.5mg BID  - If patient agitated and QTc < 500, can use Zyprexa 2.5mg PO/IM q6h PRN per agitation regimen at [facility].   	- Additional 2.5mg Zyprexa PRN warranted if pt remains agitate following first dose  - Hold on restarting full regimen (trazodone, gabapentin) due to recent acute medical issues (AMS) and to avoid polypharmacy    - Patient needs inpatient psychiatric admission, 2PC completed, in pt chart     - SW working on other inpatient psychiatric facility due to insurance issues    DO NOT GIVE IM Zyprexa within 2hrs of IM/IV ativan due to risk of respiratory depression

## 2021-11-25 NOTE — BH CONSULTATION LIAISON PROGRESS NOTE - NSBHCHARTREVIEWINVESTIGATE_PSY_A_CORE FT
< from: CT Angio Neck w/ IV Cont (11.21.21 @ 00:01) >    IMPRESSION:      HEAD CT AND RAPID PERFUSION:    HEAD CT: Stable ventriculomegaly and chronic microvascular disease. No acute intracranial hemorrhage or mass effect.    CT PERFUSION demonstrated: No core infarct. Small area of Tmax prolongation in the left occipital lobe is likely artifactual.    If symptoms persist consider follow up head CT or MRI, MRA  if no contraindication.    CTA COW:  Patent intracranial circulation without flow limiting stenosis.    CTA NECK: Patent, ECAs, ICAs, no  hemodynamically significant stenosis at  ICA origins by NASCET criteria.  Bilateral vertebral arteries are patent without flow limiting stenosis.    --- End of Report ---    < end of copied text >    

## 2021-11-25 NOTE — PROGRESS NOTE ADULT - PROBLEM SELECTOR PLAN 1
Brought in by EMS for increasing lethargy i/s/o hypotension (SBP in 80s). Vitals stable, afebrile. A&Ox3 at baseline. No leukocytosis, UCx and BCx negative. CXR clear. CT Head, CT perfusion, CTA head and neck negative for acute pathology and significant stenosis. Lactate, ammonia, CK wnl. Unclear etiology. Likely medication induced    -no signs or symptoms of infection  -neuro exam normal  -at baseline mental status  -urine tox positive for benzodiazepines (Ativan PTA)  -neuro following: rec MR head without contrast  -EKG NSR without signs of ischemia  -TSH, B12, folate wnl  -orthostatic vitals normal  -passed dysphagia screen in ED, will start pureed diet  -pt reports anisocuria is chronic   -per psych rec, will resume risperidal and Ativan 1 mg PO BID  -ppsych rec continue to hold trazodone and gabapentin due to recent AMS and polypharmacy Brought in by EMS for increasing lethargy i/s/o hypotension (SBP in 80s). Vitals stable, afebrile. A&Ox3 at baseline. No leukocytosis, UCx and BCx negative. CXR clear. CT Head, CT perfusion, CTA head and neck negative for acute pathology and significant stenosis. Lactate, ammonia, CK wnl. Unclear etiology. Likely medication induced    -no signs or symptoms of infection  -neuro exam normal  -at baseline mental status  -urine tox positive for benzodiazepines (Ativan PTA)  -neuro following: rec MR head without contrast  -EKG NSR without signs of ischemia  -TSH, B12, folate wnl  -orthostatic vitals normal  -passed dysphagia screen in ED, will start pureed diet  -pt reports anisocuria is chronic   -per psych rec, will resume risperidal and Ativan 1 mg PO BID  -psych rec continue to hold trazodone and gabapentin due to recent AMS and polypharmacy

## 2021-11-25 NOTE — PROGRESS NOTE ADULT - SUBJECTIVE AND OBJECTIVE BOX
PROGRESS NOTE:   Authored by Dr. Rosa Estrada MD (PGY-1). Pager [facility] [phone]/ [facility]     Patient is a 70y old  Female who presents with a chief complaint of AMS (23 Nov 2021 13:45)      SUBJECTIVE / OVERNIGHT EVENTS:  No acute events overnight.     ADDITIONAL REVIEW OF SYSTEMS:  Patient denies fevers, chills, chest pain, shortness of breath, nausea, abdominal pain, diarrhea, constipation, dysuria, leg swelling, headache, light headedness.    MEDICATIONS  (STANDING):  ammonium lactate   12% Cream 1 Application(s) Topical two times a day  benztropine 0.5 milliGRAM(s) Oral two times a day  carbidopa/levodopa  25/100 1 Tablet(s) Oral three times a day  diVALproex Sprinkle 750 milliGRAM(s) Oral at bedtime  enoxaparin Injectable 40 milliGRAM(s) SubCutaneous daily  hydrocortisone 1% Cream 1 Application(s) Topical every 12 hours  influenza  Vaccine (HIGH DOSE) 0.7 milliLiter(s) IntraMuscular once  lactulose Syrup 10 Gram(s) Oral at bedtime  levothyroxine 75 MICROGram(s) Oral daily  lisinopril 10 milliGRAM(s) Oral daily  LORazepam     Tablet 1 milliGRAM(s) Oral <User Schedule>  metoprolol tartrate 25 milliGRAM(s) Oral two times a day  risperiDONE   Tablet 1.5 milliGRAM(s) Oral two times a day  senna 1 Tablet(s) Oral two times a day    MEDICATIONS  (PRN):  OLANZapine Injectable 5 milliGRAM(s) IntraMuscular every 6 hours PRN agitation      CAPILLARY BLOOD GLUCOSE      POCT Blood Glucose.: 120 mg/dL (24 Nov 2021 18:04)  POCT Blood Glucose.: 121 mg/dL (24 Nov 2021 11:04)    I&O's Summary      PHYSICAL EXAM:  Vital Signs Last 24 Hrs  T(C): 36.1 (25 Nov 2021 06:03), Max: 36.7 (24 Nov 2021 13:11)  T(F): 97 (25 Nov 2021 06:03), Max: 98.1 (24 Nov 2021 13:11)  HR: 91 (25 Nov 2021 06:03) (71 - 94)  BP: 160/67 (25 Nov 2021 06:03) (132/62 - 160/67)  BP(mean): --  RR: 18 (25 Nov 2021 06:03) (18 - 18)  SpO2: 93% (25 Nov 2021 06:03) (93% - 100%)    CONSTITUTIONAL: NAD, well-developed  HEET: MMM, EOMI, PERRLA  NECK: supple  RESPIRATORY: Normal respiratory effort; lungs are clear to auscultation bilaterally  CARDIOVASCULAR: Regular rate and rhythm, normal S1 and S2, no murmur/rub/gallop; No lower extremity edema; Peripheral pulses are 2+ bilaterally  ABDOMEN: Nontender to palpation, normoactive bowel sounds, no rebound/guarding; No hepatosplenomegaly  MUSCULOSKELETAL: no clubbing or cyanosis of digits; no joint swelling or tenderness to palpation  NEURO: Moving all four extremities, sensation grossly intact  PSYCH: A+O to person, place, and time; affect appropriate  SKIN: No rash    LABS:                      Tele Reviewed:    RADIOLOGY & ADDITIONAL TESTS:  Results Reviewed:   Imaging Personally Reviewed:  Electrocardiogram Personally Reviewed:     PROGRESS NOTE:   Authored by Dr. Rosa Estrada MD (PGY-1). Pager [facility] [phone]/ [facility]     Patient is a 70y old  Female who presents with a chief complaint of AMS (23 Nov 2021 13:45)      SUBJECTIVE / OVERNIGHT EVENTS:  Mecca Coleman called at about 6:30 AM. Patient agitated and combative, screaming profanities, lashing out towards staff. Per nursing, pt tried to jump on PCA prior to code grey being called. Security at bedside. Patient screaming about "her babies," opening legs and stating her baby is coming out. Per night team, patient extremely agitated all night, impulsive and unpredictable. I spoke with emergency psych attending on call who recommended alternating Seroquel, Ativan and Zyprexa and holding Ativan if not having any effect due to deliriogenic properties that may be worsening agitation. No Haldol due to Parkinson's disease. Will place 4 point restraints due to aggressive behavior towards staff.      ADDITIONAL REVIEW OF SYSTEMS:  Patient denies fevers, chills, chest pain, shortness of breath, nausea, abdominal pain, diarrhea, constipation, dysuria, leg swelling, headache, light headedness.    MEDICATIONS  (STANDING):  ammonium lactate   12% Cream 1 Application(s) Topical two times a day  benztropine 0.5 milliGRAM(s) Oral two times a day  carbidopa/levodopa  25/100 1 Tablet(s) Oral three times a day  diVALproex Sprinkle 750 milliGRAM(s) Oral at bedtime  enoxaparin Injectable 40 milliGRAM(s) SubCutaneous daily  hydrocortisone 1% Cream 1 Application(s) Topical every 12 hours  influenza  Vaccine (HIGH DOSE) 0.7 milliLiter(s) IntraMuscular once  lactulose Syrup 10 Gram(s) Oral at bedtime  levothyroxine 75 MICROGram(s) Oral daily  lisinopril 10 milliGRAM(s) Oral daily  LORazepam     Tablet 1 milliGRAM(s) Oral <User Schedule>  metoprolol tartrate 25 milliGRAM(s) Oral two times a day  risperiDONE   Tablet 1.5 milliGRAM(s) Oral two times a day  senna 1 Tablet(s) Oral two times a day    MEDICATIONS  (PRN):  OLANZapine Injectable 5 milliGRAM(s) IntraMuscular every 6 hours PRN agitation      CAPILLARY BLOOD GLUCOSE      POCT Blood Glucose.: 120 mg/dL (24 Nov 2021 18:04)  POCT Blood Glucose.: 121 mg/dL (24 Nov 2021 11:04)    I&O's Summary      PHYSICAL EXAM:  Vital Signs Last 24 Hrs  T(C): 36.1 (25 Nov 2021 06:03), Max: 36.7 (24 Nov 2021 13:11)  T(F): 97 (25 Nov 2021 06:03), Max: 98.1 (24 Nov 2021 13:11)  HR: 91 (25 Nov 2021 06:03) (71 - 94)  BP: 160/67 (25 Nov 2021 06:03) (132/62 - 160/67)  BP(mean): --  RR: 18 (25 Nov 2021 06:03) (18 - 18)  SpO2: 93% (25 Nov 2021 06:03) (93% - 100%)    CONSTITUTIONAL: extremely agitated, yelling, not cooperative  HEET: MMM, EOMI, PERRLA  NECK: supple  RESPIRATORY: Normal respiratory effort; unable to listen to lungs as pt aggressive and combative due to psychosis  CARDIOVASCULAR: unable to obtain as pt aggressive and combative due to psychosis   ABDOMEN: nable to obtain as pt aggressive and combative due to psychosis   MUSCULOSKELETAL: no clubbing or cyanosis of digits; no joint swelling or tenderness to palpation  NEURO: Moving all four extremities  PSYCH: agitated, combative, trying to get out of room, paranoid, unpredictable, pressured speech, disorganized thought process, hard to redirect, poor insight  SKIN: No rash    LABS:                           12.1   4.96  )-----------( 211      ( 25 Nov 2021 06:51 )             38.8     11-25    144  |  109<H>  |  16  ----------------------------<  99  4.1   |  24  |  0.78    Ca    9.9      25 Nov 2021 06:51  Phos  3.9     11-25  Mg     1.90     11-25          Tele Reviewed:    RADIOLOGY & ADDITIONAL TESTS:  Results Reviewed:   Imaging Personally Reviewed:  Electrocardiogram Personally Reviewed:

## 2021-11-25 NOTE — PROGRESS NOTE ADULT - PROBLEM SELECTOR PLAN 2
Transferred from [facility]. Decompensated Schizophrenia    - per psych discussion with Dr. Chung at [facility], patient has been manic, labile, tearful and hyperreligious, consistent with history of schizophrenia, while at [facility]  - currently at baseline mental status. Continues with disorganized thought process, paranoia, Roman [employer] preoccupations  - continue depakote 750mg qhs and Cogentin 0.5mg BID  - - Zyprexa 2.5mg PO/IM q6h PRN per agitation   - restart Risperdal 1.5mg BID due to concern for psychiatric decompensation   - restart Ativan 1 mg PO BID per psych to avoid withdrawal  - psych rec continuing to hold trazodone and gabapentin due to recent AMS and to avoid polypharmacy    - pt will need inpatient psychiatric treatment for further stabilization per psych Transferred from [facility]. Decompensated Schizophrenia. Pt extremely agitated overnight. Code Jared called.     -per discussion with psych, recommended alternating Seroquel, Ativan and Zyprexa for agitation and holding Ativan if not having any effect due to deliriogenic properties that may be worsening agitation. No Haldol due to Parkinson's disease  - 4 point restraints due to aggression towards staff  - per psych discussion with Dr. Chung at [facility], patient has been manic, labile, tearful and hyperreligious, consistent with history of schizophrenia, while at [facility]  - currently at baseline mental status. Continues with disorganized thought process, paranoia, Church preoccupations  - continue depakote 750mg qhs and Cogentin 0.5mg BID  - Zyprexa 2.5mg PO/IM q6h PRN per agitation   - restart Risperdal 1.5mg BID due to concern for psychiatric decompensation   - restart Ativan 1 mg PO BID per psych to avoid withdrawal  - psych rec continuing to hold trazodone and gabapentin due to recent AMS and to avoid polypharmacy    - pt will need inpatient psychiatric treatment for further stabilization per psych

## 2021-11-26 LAB
ALBUMIN SERPL ELPH-MCNC: 3.7 G/DL — SIGNIFICANT CHANGE UP (ref 3.3–5)
ALP SERPL-CCNC: 93 U/L — SIGNIFICANT CHANGE UP (ref 40–120)
ALT FLD-CCNC: 6 U/L — SIGNIFICANT CHANGE UP (ref 4–33)
ANION GAP SERPL CALC-SCNC: 13 MMOL/L — SIGNIFICANT CHANGE UP (ref 7–14)
AST SERPL-CCNC: 21 U/L — SIGNIFICANT CHANGE UP (ref 4–32)
BILIRUB SERPL-MCNC: 0.3 MG/DL — SIGNIFICANT CHANGE UP (ref 0.2–1.2)
BUN SERPL-MCNC: 13 MG/DL — SIGNIFICANT CHANGE UP (ref 7–23)
CALCIUM SERPL-MCNC: 9.6 MG/DL — SIGNIFICANT CHANGE UP (ref 8.4–10.5)
CHLORIDE SERPL-SCNC: 112 MMOL/L — HIGH (ref 98–107)
CO2 SERPL-SCNC: 21 MMOL/L — LOW (ref 22–31)
CREAT SERPL-MCNC: 0.76 MG/DL — SIGNIFICANT CHANGE UP (ref 0.5–1.3)
CULTURE RESULTS: SIGNIFICANT CHANGE UP
CULTURE RESULTS: SIGNIFICANT CHANGE UP
GLUCOSE BLDC GLUCOMTR-MCNC: 100 MG/DL — HIGH (ref 70–99)
GLUCOSE SERPL-MCNC: 107 MG/DL — HIGH (ref 70–99)
HCT VFR BLD CALC: 34.2 % — LOW (ref 34.5–45)
HGB BLD-MCNC: 11.2 G/DL — LOW (ref 11.5–15.5)
MAGNESIUM SERPL-MCNC: 1.9 MG/DL — SIGNIFICANT CHANGE UP (ref 1.6–2.6)
MCHC RBC-ENTMCNC: 27.9 PG — SIGNIFICANT CHANGE UP (ref 27–34)
MCHC RBC-ENTMCNC: 32.7 GM/DL — SIGNIFICANT CHANGE UP (ref 32–36)
MCV RBC AUTO: 85.3 FL — SIGNIFICANT CHANGE UP (ref 80–100)
NRBC # BLD: 0 /100 WBCS — SIGNIFICANT CHANGE UP
NRBC # FLD: 0 K/UL — SIGNIFICANT CHANGE UP
PHOSPHATE SERPL-MCNC: 3.4 MG/DL — SIGNIFICANT CHANGE UP (ref 2.5–4.5)
PLATELET # BLD AUTO: 224 K/UL — SIGNIFICANT CHANGE UP (ref 150–400)
POTASSIUM SERPL-MCNC: 4 MMOL/L — SIGNIFICANT CHANGE UP (ref 3.5–5.3)
POTASSIUM SERPL-SCNC: 4 MMOL/L — SIGNIFICANT CHANGE UP (ref 3.5–5.3)
PROT SERPL-MCNC: 7.5 G/DL — SIGNIFICANT CHANGE UP (ref 6–8.3)
RBC # BLD: 4.01 M/UL — SIGNIFICANT CHANGE UP (ref 3.8–5.2)
RBC # FLD: 15.8 % — HIGH (ref 10.3–14.5)
SODIUM SERPL-SCNC: 146 MMOL/L — HIGH (ref 135–145)
SPECIMEN SOURCE: SIGNIFICANT CHANGE UP
SPECIMEN SOURCE: SIGNIFICANT CHANGE UP
WBC # BLD: 7.25 K/UL — SIGNIFICANT CHANGE UP (ref 3.8–10.5)
WBC # FLD AUTO: 7.25 K/UL — SIGNIFICANT CHANGE UP (ref 3.8–10.5)

## 2021-11-26 PROCEDURE — 99233 SBSQ HOSP IP/OBS HIGH 50: CPT

## 2021-11-26 PROCEDURE — 99232 SBSQ HOSP IP/OBS MODERATE 35: CPT | Mod: GC

## 2021-11-26 RX ORDER — BENZTROPINE MESYLATE 1 MG
0.25 TABLET ORAL
Refills: 0 | Status: DISCONTINUED | OUTPATIENT
Start: 2021-11-26 | End: 2021-12-07

## 2021-11-26 RX ADMIN — QUETIAPINE FUMARATE 50 MILLIGRAM(S): 200 TABLET, FILM COATED ORAL at 22:14

## 2021-11-26 RX ADMIN — CARBIDOPA AND LEVODOPA 1 TABLET(S): 25; 100 TABLET ORAL at 06:29

## 2021-11-26 RX ADMIN — QUETIAPINE FUMARATE 50 MILLIGRAM(S): 200 TABLET, FILM COATED ORAL at 05:05

## 2021-11-26 RX ADMIN — ENOXAPARIN SODIUM 40 MILLIGRAM(S): 100 INJECTION SUBCUTANEOUS at 13:01

## 2021-11-26 RX ADMIN — Medication 0.25 MILLIGRAM(S): at 17:49

## 2021-11-26 RX ADMIN — Medication 25 MILLIGRAM(S): at 17:50

## 2021-11-26 RX ADMIN — Medication 0.5 MILLIGRAM(S): at 06:28

## 2021-11-26 RX ADMIN — CARBIDOPA AND LEVODOPA 1 TABLET(S): 25; 100 TABLET ORAL at 13:01

## 2021-11-26 RX ADMIN — LACTULOSE 10 GRAM(S): 10 SOLUTION ORAL at 22:13

## 2021-11-26 RX ADMIN — LISINOPRIL 10 MILLIGRAM(S): 2.5 TABLET ORAL at 06:30

## 2021-11-26 RX ADMIN — Medication 1 MILLIGRAM(S): at 22:15

## 2021-11-26 RX ADMIN — RISPERIDONE 1.5 MILLIGRAM(S): 4 TABLET ORAL at 17:50

## 2021-11-26 RX ADMIN — Medication 1 APPLICATION(S): at 17:51

## 2021-11-26 RX ADMIN — Medication 25 MILLIGRAM(S): at 06:30

## 2021-11-26 RX ADMIN — Medication 1 APPLICATION(S): at 06:29

## 2021-11-26 RX ADMIN — CARBIDOPA AND LEVODOPA 1 TABLET(S): 25; 100 TABLET ORAL at 22:14

## 2021-11-26 RX ADMIN — SENNA PLUS 1 TABLET(S): 8.6 TABLET ORAL at 06:29

## 2021-11-26 RX ADMIN — Medication 75 MICROGRAM(S): at 08:20

## 2021-11-26 RX ADMIN — DIVALPROEX SODIUM 750 MILLIGRAM(S): 500 TABLET, DELAYED RELEASE ORAL at 22:14

## 2021-11-26 RX ADMIN — Medication 1 MILLIGRAM(S): at 09:08

## 2021-11-26 RX ADMIN — RISPERIDONE 1.5 MILLIGRAM(S): 4 TABLET ORAL at 06:28

## 2021-11-26 NOTE — PROGRESS NOTE ADULT - ATTENDING COMMENTS
70W PMH of HTN, T2DM, asthma, Parkinson's disease, seizure disorder, schizophrenia BIBEMS from [facility] for acute change in mental status and episode of hypotension. Stroke code was called in the ED for anisocoria, however this was discovered to be chronic finding. Hypotension and lethargy improved in the ED and admitted to medicine to rule out secondary causes.    # Schizophrenia - remains decompensated and will need transfer back to Psych, but currently calm and cooperative.  following appreciated recs. Would continue to reintroduce medications cautiously.  # AMS - transient, no definitive inciting cause, suspect medication induced. Patient is on multiple sedative medications prior to arrival.  seen by neurologyracquel, appreciate recs. No need for MRI/EEG. Holding gabapentin and Trazadone. Ativan resumed at lower dose 1mg BID (from TID). Resumed other psychotropic meds given increased agitation/ hallucinations.  # Hypotension - Secondary workup unrevealing, likely medication induced.  Dispo: To [facility] psych

## 2021-11-26 NOTE — PROGRESS NOTE ADULT - ASSESSMENT
69 yo F with PMH of HTN, T2DM, asthma, seizure disorder, Parkinson's disease, glaucoma, schizophrenia, hemorrhoids BIBEMS from [facility] for acute change in mental status i/s/o hypotension. Likely medication-induced given negative brain imaging and negative metabolic and infectious work up. Waiting for acceptance by inpatient psych facility

## 2021-11-26 NOTE — PROGRESS NOTE ADULT - PROBLEM SELECTOR PLAN 2
Transferred from [facility]. Decompensated Schizophrenia. Pt extremely agitated overnight. Code Jared called.     -per discussion with psych, recommended alternating Seroquel, Ativan and Zyprexa for agitation and holding Ativan if not having any effect due to deliriogenic properties that may be worsening agitation. No Haldol due to Parkinson's disease  - 4 point restraints due to aggression towards staff  - per psych discussion with Dr. Chung at [facility], patient has been manic, labile, tearful and hyperreligious, consistent with history of schizophrenia, while at [facility]  - currently at baseline mental status. Continues with disorganized thought process, paranoia, Sikhism preoccupations  - continue depakote 750mg qhs and Cogentin 0.5mg BID  - Zyprexa 2.5mg PO/IM q6h PRN per agitation   - restart Risperdal 1.5mg BID due to concern for psychiatric decompensation   - restart Ativan 1 mg PO BID per psych to avoid withdrawal  - psych rec continuing to hold trazodone and gabapentin due to recent AMS and to avoid polypharmacy    - pt will need inpatient psychiatric treatment for further stabilization per psych Transferred from [facility]. Decompensated Schizophrenia.     -per discussion with psych, recommended alternating Seroquel, Ativan and Zyprexa for agitation and holding Ativan if not having any effect due to deliriogenic properties that may be worsening agitation. No Haldol due to Parkinson's disease  - agitated at times, now calm and cooperative   - per psych discussion with Dr. Chung at [facility], patient has been manic, labile, tearful and hyperreligious, consistent with history of schizophrenia, while at [facility]  - currently at baseline mental status. Continues with disorganized thought process, paranoia, Buddhist preoccupations  - continue depakote 750mg qhs and Cogentin 0.5mg BID  - Zyprexa 2.5mg PO/IM q6h PRN per agitation   - restart Risperdal 1.5mg BID due to concern for psychiatric decompensation   - restart Ativan 1 mg PO BID per psych to avoid withdrawal  - psych rec continuing to hold trazodone and gabapentin due to recent AMS and to avoid polypharmacy    - pt will need inpatient psychiatric treatment for further stabilization per psych Transferred from [facility]. Decompensated Schizophrenia.     -per discussion with psych, recommended alternating Seroquel 50 mg PRN, Ativan 1mg PRN and Zyprexa 5 mg IM PRN for agitation and holding Ativan if not having any effect due to deliriogenic properties that may be worsening agitation. No Haldol due to Parkinson's disease  - agitated at times, now calm and cooperative   - per psych discussion with Dr. Chung at [facility], patient has been manic, labile, tearful and hyperreligious, consistent with history of schizophrenia, while at [facility]  - currently at baseline mental status. Continues with disorganized thought process, paranoia, Spiritism preoccupations. Off restraints  - continue depakote 750mg qhs and Cogentin 0.5mg BID  - Zyprexa 5.0 mg PO/IM q6h PRN per agitation   - c/w Risperdal 1.5mg BID    - c/w Ativan 1 mg PO BID per psych to avoid withdrawal  - psych rec continuing to hold trazodone and gabapentin due to recent AMS and to avoid polypharmacy    - pt will need inpatient psychiatric treatment for further stabilization per psych

## 2021-11-26 NOTE — BH CONSULTATION LIAISON PROGRESS NOTE - NSBHFUPINTERVALHXFT_PSY_A_CORE
Chart reviewed. Received Seroquel PRN overnight. On exam, is with 1:1, is partly sedated, slurred, with eyes half-mast, c/o dry mouth and constipation, a bit delirious (saying at first it was December 1950 but quickly correcting herself). No focal neuro deficits or parkinsonism noted today. She endorsed experience of seeing a vision of a cross, hearing her sister's voice, and distrusting the environment here. However she also denied any safety concerns, SI, or HI.     Case discussed with primary team.

## 2021-11-26 NOTE — PROGRESS NOTE ADULT - SUBJECTIVE AND OBJECTIVE BOX
PROGRESS NOTE:   Authored by Dr. Rosa Estrada MD (PGY-1). Pager [facility] [phone]/ [facility]     Patient is a 70y old  Female who presents with a chief complaint of AMS (23 Nov 2021 13:45)      SUBJECTIVE / OVERNIGHT EVENTS:  No acute events overnight.     ADDITIONAL REVIEW OF SYSTEMS:  Patient denies fevers, chills, chest pain, shortness of breath, nausea, abdominal pain, diarrhea, constipation, dysuria, leg swelling, headache, light headedness.    MEDICATIONS  (STANDING):  ammonium lactate   12% Cream 1 Application(s) Topical two times a day  benztropine 0.5 milliGRAM(s) Oral two times a day  carbidopa/levodopa  25/100 1 Tablet(s) Oral three times a day  diVALproex Sprinkle 750 milliGRAM(s) Oral at bedtime  enoxaparin Injectable 40 milliGRAM(s) SubCutaneous daily  hydrocortisone 1% Cream 1 Application(s) Topical every 12 hours  influenza  Vaccine (HIGH DOSE) 0.7 milliLiter(s) IntraMuscular once  lactulose Syrup 10 Gram(s) Oral at bedtime  levothyroxine 75 MICROGram(s) Oral daily  lisinopril 10 milliGRAM(s) Oral daily  LORazepam     Tablet 1 milliGRAM(s) Oral <User Schedule>  metoprolol tartrate 25 milliGRAM(s) Oral two times a day  risperiDONE   Tablet 1.5 milliGRAM(s) Oral two times a day  senna 1 Tablet(s) Oral two times a day    MEDICATIONS  (PRN):  OLANZapine Injectable 5 milliGRAM(s) IntraMuscular every 6 hours PRN agitation  QUEtiapine 50 milliGRAM(s) Oral every 6 hours PRN agitation      CAPILLARY BLOOD GLUCOSE      POCT Blood Glucose.: 137 mg/dL (25 Nov 2021 22:48)  POCT Blood Glucose.: 97 mg/dL (25 Nov 2021 09:00)    I&O's Summary      PHYSICAL EXAM:  Vital Signs Last 24 Hrs  T(C): 37 (26 Nov 2021 06:43), Max: 37 (26 Nov 2021 06:43)  T(F): 98.6 (26 Nov 2021 06:43), Max: 98.6 (26 Nov 2021 06:43)  HR: 108 (26 Nov 2021 06:43) (80 - 108)  BP: 154/92 (26 Nov 2021 06:43) (123/80 - 158/69)  BP(mean): --  RR: 19 (26 Nov 2021 06:43) (17 - 19)  SpO2: 100% (26 Nov 2021 06:43) (100% - 100%)    CONSTITUTIONAL: NAD, well-developed  HEET: MMM, EOMI, PERRLA  NECK: supple  RESPIRATORY: Normal respiratory effort; lungs are clear to auscultation bilaterally  CARDIOVASCULAR: Regular rate and rhythm, normal S1 and S2, no murmur/rub/gallop; No lower extremity edema; Peripheral pulses are 2+ bilaterally  ABDOMEN: Nontender to palpation, normoactive bowel sounds, no rebound/guarding; No hepatosplenomegaly  MUSCULOSKELETAL: no clubbing or cyanosis of digits; no joint swelling or tenderness to palpation  NEURO: Moving all four extremities, sensation grossly intact  PSYCH: A+O to person, place, and time; affect appropriate  SKIN: No rash    LABS:                        12.1   4.96  )-----------( 211      ( 25 Nov 2021 06:51 )             38.8     11-25    144  |  109<H>  |  16  ----------------------------<  99  4.1   |  24  |  0.78    Ca    9.9      25 Nov 2021 06:51  Phos  3.9     11-25  Mg     1.90     11-25                  Tele Reviewed:    RADIOLOGY & ADDITIONAL TESTS:  Results Reviewed:   Imaging Personally Reviewed:  Electrocardiogram Personally Reviewed:     PROGRESS NOTE:   Authored by Dr. Rosa Estrada MD (PGY-1). Pager [facility] [phone]/ [facility]     Patient is a 70y old  Female who presents with a chief complaint of AMS (23 Nov 2021 13:45)      SUBJECTIVE / OVERNIGHT EVENTS:  Patient calm and cooperative. Resting comfortably in bed.     ADDITIONAL REVIEW OF SYSTEMS:  Patient denies fevers, chills, chest pain, shortness of breath, nausea, abdominal pain, diarrhea, constipation, dysuria, leg swelling, headache, light headedness.    MEDICATIONS  (STANDING):  ammonium lactate   12% Cream 1 Application(s) Topical two times a day  benztropine 0.5 milliGRAM(s) Oral two times a day  carbidopa/levodopa  25/100 1 Tablet(s) Oral three times a day  diVALproex Sprinkle 750 milliGRAM(s) Oral at bedtime  enoxaparin Injectable 40 milliGRAM(s) SubCutaneous daily  hydrocortisone 1% Cream 1 Application(s) Topical every 12 hours  influenza  Vaccine (HIGH DOSE) 0.7 milliLiter(s) IntraMuscular once  lactulose Syrup 10 Gram(s) Oral at bedtime  levothyroxine 75 MICROGram(s) Oral daily  lisinopril 10 milliGRAM(s) Oral daily  LORazepam     Tablet 1 milliGRAM(s) Oral <User Schedule>  metoprolol tartrate 25 milliGRAM(s) Oral two times a day  risperiDONE   Tablet 1.5 milliGRAM(s) Oral two times a day  senna 1 Tablet(s) Oral two times a day    MEDICATIONS  (PRN):  OLANZapine Injectable 5 milliGRAM(s) IntraMuscular every 6 hours PRN agitation  QUEtiapine 50 milliGRAM(s) Oral every 6 hours PRN agitation      CAPILLARY BLOOD GLUCOSE      POCT Blood Glucose.: 137 mg/dL (25 Nov 2021 22:48)  POCT Blood Glucose.: 97 mg/dL (25 Nov 2021 09:00)    I&O's Summary      PHYSICAL EXAM:  Vital Signs Last 24 Hrs  T(C): 37 (26 Nov 2021 06:43), Max: 37 (26 Nov 2021 06:43)  T(F): 98.6 (26 Nov 2021 06:43), Max: 98.6 (26 Nov 2021 06:43)  HR: 108 (26 Nov 2021 06:43) (80 - 108)  BP: 154/92 (26 Nov 2021 06:43) (123/80 - 158/69)   BP(mean): --  RR: 19 (26 Nov 2021 06:43) (17 - 19)  SpO2: 100% (26 Nov 2021 06:43) (100% - 100%)    CONSTITUTIONAL: NAD, calm, lethargic, resting comfortably in bed  HEET: MMM, EOMI, PERRLA  NECK: supple  RESPIRATORY: Normal respiratory effort; lungs CTAB  CARDIOVASCULAR: RRR, normal S1 and S2, no murmurs, rubs or gallops  ABDOMEN: distended, soft, nontender, bowel sounds present    MUSCULOSKELETAL: no clubbing or cyanosis of digits; no joint swelling or tenderness to palpation  NEURO: Moving all four extremities  PSYCH calm, cooperative, disorganized thought process, hard to redirect, poor insight, denies SI, HI, AVH  SKIN: No rash      LABS:                          11.2   7.25  )-----------( 224      ( 26 Nov 2021 06:45 )             34.2       11-26    146<H>  |  112<H>  |  13  ----------------------------<  107<H>  4.0   |  21<L>  |  0.76    Ca    9.6      26 Nov 2021 06:45  Phos  3.4     11-26  Mg     1.90     11-26    TPro  7.5  /  Alb  3.7  /  TBili  0.3  /  DBili  x   /  AST  21  /  ALT  6   /  AlkPhos  93  11-26                                            12.1   4.96  )-----------( 211      ( 25 Nov 2021 06:51 )             38.8     11-25    144  |  109<H>  |  16  ----------------------------<  99  4.1   |  24  |  0.78    Ca    9.9      25 Nov 2021 06:51  Phos  3.9     11-25  Mg     1.90     11-25                  Tele Reviewed:    RADIOLOGY & ADDITIONAL TESTS:  Results Reviewed:   Imaging Personally Reviewed:  Electrocardiogram Personally Reviewed:

## 2021-11-26 NOTE — BH CONSULTATION LIAISON PROGRESS NOTE - NSBHASSESSMENTFT_PSY_ALL_CORE
Pt is a 69 y/o AAF, domiciled at [facility], long hx of Schizophrenia with multiple past inpatient hospitalizations, last hospitalization started at [facility] 10/13/21, past hx of aggression towards staff and peers, no documented hx of SA, pertinent PMH includes HLD, HTN, Parkinson's dx. Seizure d/o, T2DM, Hypothyroidism, Glaucoma, PVD, Asthma, Bladder Ca. Pt was found unconscious on the floor at [facility] on 11/20/21, BIBEMS from [facility] for acute change in mental status.     Pt is currently being medically managed by the medicine and neurology team for AMS episode. CT head, CTA head and neck and CT perfusion has been done, grossly negative. R/o infectious and metabolic causes of AMS.     Pt care was discussed with Dr. Chung at [facility] on 11/22, patient has been manic, labile, tearful and hyperreligious, consistent with history of schizophrenia, currently decompensated. Pt currently lacks insight, she is paranoid with extremely disorganized thoughts making her a harm to herself and others and requires continued inpatient hospitalization for monitoring and medication reconciliation. Pt has a history of aggression. She is unpredictable and impulsive. Although cooperative today, history of agitation and aggression warrants continued low threshold for PRN and restraints.   Current PRN Zyprexa 2.5mg q6hrs, with additional dose if pt lacks response. Rx was restarted on psychiatric medications due to concern for psychiatric decompensation. Continue Risperidone 1.5mg BID. Continue Ativan 1mg BID. SW aware of insurance issues which does not allow pt to return to [facility] for inpatient care. As per [facility] note, awaiting response from other inpatient hospitals.      Plan:  - CO 1:1 required due to patient lack of insight and extremely disorganized, delusional, risk to self and others - hx of aggression  	- LOW threshold for PRN usage for agitation due to history of aggression  	- Consider restraints if agitation escalates or aggression  - Continue Risperidone 1.5mg BID PO  - Continue Ativan 1mg BID PO, HOLD for sedation and monitor pulse ox closely (was on 1mg TID at [facility])    - Continue Depakote 750mg qHS for seizures, would defer further titration if needed as per primary/neuro team        ***Monitor LFTs, Platelets, ammonia level and VPA level  - DECREASE benztropine to 0.25mg BID given constipation, confusion, slurred speech, dry mouth (all s/s of anticholinergic load)  - If patient agitated and QTc < 500, can use Zyprexa 2.5mg PO/IM q6h PRN per agitation regimen at [facility].   	- Additional 2.5mg Zyprexa PRN warranted if pt remains agitate following first dose  - Hold on restarting full regimen (trazodone, gabapentin) due to recent acute medical issues (AMS) and to avoid polypharmacy    - Patient needs inpatient psychiatric admission, 2PC completed, in pt chart     - SW working on other inpatient psychiatric facility due to insurance issues    DO NOT GIVE IM Zyprexa within 1hr of IM/IV ativan due to risk of respiratory depression

## 2021-11-26 NOTE — PROGRESS NOTE ADULT - PROBLEM SELECTOR PLAN 1
Brought in by EMS for increasing lethargy i/s/o hypotension (SBP in 80s). Vitals stable, afebrile. A&Ox3 at baseline. No leukocytosis, UCx and BCx negative. CXR clear. CT Head, CT perfusion, CTA head and neck negative for acute pathology and significant stenosis. Lactate, ammonia, CK wnl. Unclear etiology. Likely medication induced    -no signs or symptoms of infection  -neuro exam normal  -at baseline mental status  -urine tox positive for benzodiazepines (Ativan PTA)  -neuro following: rec MR head without contrast  -EKG NSR without signs of ischemia  -TSH, B12, folate wnl  -orthostatic vitals normal  -passed dysphagia screen in ED, will start pureed diet  -pt reports anisocuria is chronic   -per psych rec, will resume risperidal and Ativan 1 mg PO BID  -psych rec continue to hold trazodone and gabapentin due to recent AMS and polypharmacy

## 2021-11-26 NOTE — CHART NOTE - NSCHARTNOTEFT_GEN_A_CORE
Chart reviewed. Patient medically optimized and pending inpatient psychiatric admission. Neurology signing off. Please reconsult PRN or call [facility] with any questions.   Discussed with primary team.

## 2021-11-26 NOTE — BH CONSULTATION LIAISON PROGRESS NOTE - NSBHCHARTREVIEWVS_PSY_A_CORE FT
Vital Signs Last 24 Hrs  T(C): 37 (26 Nov 2021 06:43), Max: 37 (26 Nov 2021 06:43)  T(F): 98.6 (26 Nov 2021 06:43), Max: 98.6 (26 Nov 2021 06:43)  HR: 108 (26 Nov 2021 06:43) (80 - 108)  BP: 154/92 (26 Nov 2021 06:43) (123/80 - 158/69)  BP(mean): --  RR: 19 (26 Nov 2021 06:43) (17 - 19)  SpO2: 100% (26 Nov 2021 06:43) (100% - 100%)

## 2021-11-26 NOTE — BH CONSULTATION LIAISON PROGRESS NOTE - MSE UNSTRUCTURED FT
On exam, is with 1:1, is partly sedated, slurred, with eyes half-mast, c/o dry mouth and constipation, a bit delirious (saying at first it was December 1950 but quickly correcting herself). No focal neuro deficits or parkinsonism noted today. She endorsed experience of seeing a vision of a cross, hearing her sister's voice, and distrusting the environment here. However she also denied any safety concerns, SI, or HI.

## 2021-11-27 LAB
GLUCOSE BLDC GLUCOMTR-MCNC: 212 MG/DL — HIGH (ref 70–99)
GLUCOSE BLDC GLUCOMTR-MCNC: 78 MG/DL — SIGNIFICANT CHANGE UP (ref 70–99)

## 2021-11-27 PROCEDURE — 99231 SBSQ HOSP IP/OBS SF/LOW 25: CPT | Mod: GC

## 2021-11-27 RX ORDER — OLANZAPINE 15 MG/1
2.5 TABLET, FILM COATED ORAL ONCE
Refills: 0 | Status: COMPLETED | OUTPATIENT
Start: 2021-11-27 | End: 2021-12-02

## 2021-11-27 RX ADMIN — CARBIDOPA AND LEVODOPA 1 TABLET(S): 25; 100 TABLET ORAL at 06:48

## 2021-11-27 RX ADMIN — SENNA PLUS 1 TABLET(S): 8.6 TABLET ORAL at 17:06

## 2021-11-27 RX ADMIN — QUETIAPINE FUMARATE 50 MILLIGRAM(S): 200 TABLET, FILM COATED ORAL at 14:47

## 2021-11-27 RX ADMIN — Medication 75 MICROGRAM(S): at 06:48

## 2021-11-27 RX ADMIN — Medication 25 MILLIGRAM(S): at 17:06

## 2021-11-27 RX ADMIN — CARBIDOPA AND LEVODOPA 1 TABLET(S): 25; 100 TABLET ORAL at 14:47

## 2021-11-27 RX ADMIN — LISINOPRIL 10 MILLIGRAM(S): 2.5 TABLET ORAL at 06:49

## 2021-11-27 RX ADMIN — Medication 1 MILLIGRAM(S): at 10:53

## 2021-11-27 RX ADMIN — ENOXAPARIN SODIUM 40 MILLIGRAM(S): 100 INJECTION SUBCUTANEOUS at 14:47

## 2021-11-27 RX ADMIN — QUETIAPINE FUMARATE 50 MILLIGRAM(S): 200 TABLET, FILM COATED ORAL at 21:39

## 2021-11-27 RX ADMIN — Medication 0.25 MILLIGRAM(S): at 17:06

## 2021-11-27 RX ADMIN — OLANZAPINE 5 MILLIGRAM(S): 15 TABLET, FILM COATED ORAL at 21:25

## 2021-11-27 RX ADMIN — RISPERIDONE 1.5 MILLIGRAM(S): 4 TABLET ORAL at 06:49

## 2021-11-27 RX ADMIN — Medication 1 MILLIGRAM(S): at 21:11

## 2021-11-27 RX ADMIN — Medication 1 APPLICATION(S): at 17:06

## 2021-11-27 RX ADMIN — Medication 25 MILLIGRAM(S): at 06:48

## 2021-11-27 RX ADMIN — RISPERIDONE 1.5 MILLIGRAM(S): 4 TABLET ORAL at 17:05

## 2021-11-27 RX ADMIN — OLANZAPINE 5 MILLIGRAM(S): 15 TABLET, FILM COATED ORAL at 12:14

## 2021-11-27 RX ADMIN — CARBIDOPA AND LEVODOPA 1 TABLET(S): 25; 100 TABLET ORAL at 21:13

## 2021-11-27 RX ADMIN — DIVALPROEX SODIUM 750 MILLIGRAM(S): 500 TABLET, DELAYED RELEASE ORAL at 21:14

## 2021-11-27 RX ADMIN — LACTULOSE 10 GRAM(S): 10 SOLUTION ORAL at 21:14

## 2021-11-27 RX ADMIN — Medication 0.25 MILLIGRAM(S): at 06:48

## 2021-11-27 RX ADMIN — SENNA PLUS 1 TABLET(S): 8.6 TABLET ORAL at 06:49

## 2021-11-27 NOTE — PROGRESS NOTE ADULT - ATTENDING COMMENTS
70W PMH of HTN, T2DM, asthma, Parkinson's disease, seizure disorder, schizophrenia BIBEMS from [facility] for acute change in mental status and episode of hypotension. Stroke code was called in the ED for anisocoria, however this was discovered to be chronic finding. Hypotension and lethargy improved in the ED and admitted to medicine to rule out secondary causes.    # Schizophrenia - remains decompensated and will need transfer back to Psych, but currently calm and cooperative.  following appreciated recs. Would continue to reintroduce medications cautiously.  # AMS - transient, no definitive inciting cause, suspect medication induced. Patient is on multiple sedative medications prior to arrival.  seen by neurologyracquel, appreciate recs. No need for MRI/EEG. Holding gabapentin and Trazadone. Ativan resumed at lower dose 1mg BID (from TID). Resumed other psychotropic meds given increased agitation/ hallucinations.  # Hypotension - Secondary workup unrevealing, likely medication induced.  Dispo: To [facility] psych

## 2021-11-27 NOTE — PROVIDER CONTACT NOTE (OTHER) - ASSESSMENT
Pt sleeping at this time.
confused restless, agitated, provider came evaluated pt
confused restless, agitated, provider came evaluated pt
Patient alert with some confusion, agitation and restlessness. Unable redirect behavior. Loud scream and singing and expression of wanting personal clothing. Continues on 1:1 constant observation
pt is a&ox1, pt is restless
pt is agitated
pt is a&ox1, pt is restless
Patient alert, asymptomatic.
pt is a&ox1, pt is restless
Patient alert with some confusion, agitation and restlessness. Unable redirect behavior. Loud scream and singing and expression of wanting personal clothing. Continues on 1:1 constant observation
combative, confused restless, agitated standing on the bed ripping the bed linen
pt is a&ox1, pt is restless

## 2021-11-27 NOTE — PROVIDER CONTACT NOTE (OTHER) - SITUATION
Patient right eye non reactive to light and 4 mm.
hr rate elevated
Patient agitated and restless refusing PO medication at this time
hr rate elevated
pt is extremely agitated, restless, confused
pt is extremely agitated, restless, confused
PT 
hr rate elevated
pt was combative agitated , restless, hallucinating
Patient agitated and restless
hr rate elevated
pt with extreme agitation

## 2021-11-27 NOTE — PROVIDER CONTACT NOTE (OTHER) - BACKGROUND
Dx: AMS pmhX: Parkinson's disease, schizophrenia with acute change in mental status upon admission
pt admit diagnosis  ams, htn, parkinson disease, schizophrenia
Pt came in for altered mental status
pt was admitted for AMS
pt was admitted for AMS
pt admit diagnosis  ams, htn, parkinson disease, schizophrenia
Dx: AMS pmhX: Parkinson's disease, schizophrenia with acute change in mental status upon admission
pt was admitted for AMS
pt was admitted for AMS
Patient admitted for altered mental status
pt admit diagnosis  ams, htn, parkinson disease, schizophrenia
pt admit diagnosis  ams, htn, parkinson disease, schizophrenia

## 2021-11-27 NOTE — PROGRESS NOTE ADULT - ASSESSMENT
69 yo F with PMH of HTN, T2DM, asthma, seizure disorder, Parkinson's disease, glaucoma, schizophrenia, hemorrhoids BIBEMS from [facility] for acute change in mental status i/s/o hypotension. Likely medication-induced given negative brain imaging and negative metabolic and infectious work up. Waiting for acceptance by inpatient psych facility

## 2021-11-27 NOTE — PROVIDER CONTACT NOTE (OTHER) - ACTION/TREATMENT ORDERED:
Ongoing monitoring for this patient - 1:1 constant observation
md  notified, continue to monitor pt
Ongoing monitoring for this patient - 1:1 constant observation
md  notified, continue to monitor pt
Zyprexa 5mgx1 dose given as ordered
Level 2  restraints applied. will  continue to monitor.
Zyprexa 5mgx1 dose given as ordered
Seroquel po given  as per order. Will continue to monitor.
md  notified, continue to monitor pt
Continue to monitor
Dr. Rosa Chung notified continue to monitor
md  notified, continue to monitor pt

## 2021-11-27 NOTE — PROVIDER CONTACT NOTE (OTHER) - RECOMMENDATIONS
level 2 restraints
Continue to monitor
Dr. Rosa Chung notified
Dr Estrada made aware. New order received for Zyprexa Im. Same administered
notify md, continue to monitor pt
Dr Estrada made aware. as per Dr. Estrada ok to hold PO medications for now
notify md, continue to monitor pt
Patient seen by MD with order for Seroquel
Zyprexa 5mgx1 dose
Zyprexa 5mgx1 dose, and put Pt back on restraints stat
notify md, continue to monitor pt
notify md, continue to monitor pt

## 2021-11-27 NOTE — PROVIDER CONTACT NOTE (OTHER) - DATE AND TIME:
25-Nov-2021 15:58
25-Nov-2021 00:30
27-Nov-2021 17:10
27-Nov-2021 22:00
24-Nov-2021 18:31
25-Nov-2021 16:25
27-Nov-2021 17:10
22-Nov-2021 05:17
23-Nov-2021 10:00
25-Nov-2021 04:30
27-Nov-2021 15:00
27-Nov-2021 15:00

## 2021-11-27 NOTE — PROVIDER CONTACT NOTE (OTHER) - REASON
hr elevated
pt was agitated, combative
hr elevated
Patient agitated and restless
pt was agitated restless,combative
pt was agitated restless,combative
PT 
Patient agitated and restless refusing PO medication at this time
hr elevated
pt with farooq
Neuro check Abnormality, Right eye non reactive to light eye 4 MM
hr elevated

## 2021-11-27 NOTE — PROGRESS NOTE ADULT - PROBLEM SELECTOR PLAN 2
Transferred from [facility]. Decompensated Schizophrenia.     -per discussion with psych, recommended alternating Seroquel 50 mg PRN, Ativan 1mg PRN and Zyprexa 5 mg IM PRN for agitation and holding Ativan if not having any effect due to deliriogenic properties that may be worsening agitation. No Haldol due to Parkinson's disease  - agitated at times, now calm and cooperative   - per psych discussion with Dr. Chung at [facility], patient has been manic, labile, tearful and hyperreligious, consistent with history of schizophrenia, while at [facility]  - currently at baseline mental status. Continues with disorganized thought process, paranoia, Orthodoxy preoccupations. Off restraints  - continue depakote 750mg qhs and Cogentin 0.5mg BID  - Zyprexa 5.0 mg PO/IM q6h PRN per agitation   - c/w Risperdal 1.5mg BID    - c/w Ativan 1 mg PO BID per psych to avoid withdrawal  - decrease cogentin to 0.25mg BID  - psych rec continuing to hold trazodone and gabapentin due to recent AMS and to avoid polypharmacy    - pt will need inpatient psychiatric treatment for further stabilization per psych

## 2021-11-27 NOTE — PROGRESS NOTE ADULT - SUBJECTIVE AND OBJECTIVE BOX
PROGRESS NOTE:   Written by Herb Ward. [facility] page number: [id number]. [place] page number [phone]    Patient is a 70y old  Female who presents with a chief complaint of AMS (23 Nov 2021 13:45)      SUBJECTIVE / OVERNIGHT EVENTS:    ADDITIONAL REVIEW OF SYSTEMS:    MEDICATIONS  (STANDING):  ammonium lactate   12% Cream 1 Application(s) Topical two times a day  benztropine 0.25 milliGRAM(s) Oral two times a day  carbidopa/levodopa  25/100 1 Tablet(s) Oral three times a day  diVALproex Sprinkle 750 milliGRAM(s) Oral at bedtime  enoxaparin Injectable 40 milliGRAM(s) SubCutaneous daily  hydrocortisone 1% Cream 1 Application(s) Topical every 12 hours  influenza  Vaccine (HIGH DOSE) 0.7 milliLiter(s) IntraMuscular once  lactulose Syrup 10 Gram(s) Oral at bedtime  levothyroxine 75 MICROGram(s) Oral daily  lisinopril 10 milliGRAM(s) Oral daily  LORazepam     Tablet 1 milliGRAM(s) Oral <User Schedule>  metoprolol tartrate 25 milliGRAM(s) Oral two times a day  risperiDONE   Tablet 1.5 milliGRAM(s) Oral two times a day  senna 1 Tablet(s) Oral two times a day    MEDICATIONS  (PRN):  OLANZapine Injectable 5 milliGRAM(s) IntraMuscular every 6 hours PRN agitation  QUEtiapine 50 milliGRAM(s) Oral every 6 hours PRN agitation      CAPILLARY BLOOD GLUCOSE      POCT Blood Glucose.: 100 mg/dL (26 Nov 2021 12:29)    I&O's Summary    26 Nov 2021 07:01  -  27 Nov 2021 07:00  --------------------------------------------------------  IN: 480 mL / OUT: 0 mL / NET: 480 mL        PHYSICAL EXAM:  Vital Signs Last 24 Hrs  T(C): 37.1 (27 Nov 2021 06:19), Max: 37.1 (26 Nov 2021 15:05)  T(F): 98.7 (27 Nov 2021 06:19), Max: 98.7 (26 Nov 2021 15:05)  HR: 104 (27 Nov 2021 06:19) (104 - 111)  BP: 116/64 (27 Nov 2021 06:19) (109/65 - 142/77)  BP(mean): --  RR: 17 (27 Nov 2021 06:19) (17 - 17)  SpO2: 99% (27 Nov 2021 06:19) (99% - 100%)    GENERAL: No acute distress, resting in bed  HEAD:  Atraumatic, Normocephalic  EYES: EOMI, PERRLA, conjunctiva and sclera clear  NECK: Supple, no lymphadenopathy, no JVD  CHEST/LUNG: CTAB; No wheezes, rales, or rhonchi  HEART: Regular rate and rhythm; No murmurs, rubs, or gallops  ABDOMEN: Soft, non-tender, non-distended; normal bowel sounds, no organomegaly  EXTREMITIES:  2+ peripheral pulses b/l, No clubbing, cyanosis, or edema  NEUROLOGY: A&O to person and place, no focal deficits  SKIN: No rashes or lesions    LABS:                        11.2   7.25  )-----------( 224      ( 26 Nov 2021 06:45 )             34.2     11-26    146<H>  |  112<H>  |  13  ----------------------------<  107<H>  4.0   |  21<L>  |  0.76    Ca    9.6      26 Nov 2021 06:45  Phos  3.4     11-26  Mg     1.90     11-26    TPro  7.5  /  Alb  3.7  /  TBili  0.3  /  DBili  x   /  AST  21  /  ALT  6   /  AlkPhos  93  11-26                RADIOLOGY & ADDITIONAL TESTS:  Results Reviewed:   Imaging Personally Reviewed:  Electrocardiogram Personally Reviewed:    COORDINATION OF CARE:  Care Discussed with Consultants/Other Providers [Y/N]:  Prior or Outpatient Records Reviewed [Y/N]:   PROGRESS NOTE:   Written by Herb Ward. [place] page number: [id number]. [place] page number [phone]    Patient is a 70y old  Female who presents with a chief complaint of AMS (23 Nov 2021 13:45)      SUBJECTIVE / OVERNIGHT EVENTS: No acute events overnight. Patient is intermittently agitated overnight, but calm and conversive in AM. Patient denies chest pain, shortness of breath, abdominal pain, fevers, chills, constipation, diarrhea, leg swelling or pain.    MEDICATIONS  (STANDING):  ammonium lactate   12% Cream 1 Application(s) Topical two times a day  benztropine 0.25 milliGRAM(s) Oral two times a day  carbidopa/levodopa  25/100 1 Tablet(s) Oral three times a day  diVALproex Sprinkle 750 milliGRAM(s) Oral at bedtime  enoxaparin Injectable 40 milliGRAM(s) SubCutaneous daily  hydrocortisone 1% Cream 1 Application(s) Topical every 12 hours  influenza  Vaccine (HIGH DOSE) 0.7 milliLiter(s) IntraMuscular once  lactulose Syrup 10 Gram(s) Oral at bedtime  levothyroxine 75 MICROGram(s) Oral daily  lisinopril 10 milliGRAM(s) Oral daily  LORazepam     Tablet 1 milliGRAM(s) Oral <User Schedule>  metoprolol tartrate 25 milliGRAM(s) Oral two times a day  risperiDONE   Tablet 1.5 milliGRAM(s) Oral two times a day  senna 1 Tablet(s) Oral two times a day    MEDICATIONS  (PRN):  OLANZapine Injectable 5 milliGRAM(s) IntraMuscular every 6 hours PRN agitation  QUEtiapine 50 milliGRAM(s) Oral every 6 hours PRN agitation      CAPILLARY BLOOD GLUCOSE      POCT Blood Glucose.: 100 mg/dL (26 Nov 2021 12:29)    I&O's Summary    26 Nov 2021 07:01  -  27 Nov 2021 07:00  --------------------------------------------------------  IN: 480 mL / OUT: 0 mL / NET: 480 mL        PHYSICAL EXAM:  Vital Signs Last 24 Hrs  T(C): 37.1 (27 Nov 2021 06:19), Max: 37.1 (26 Nov 2021 15:05)  T(F): 98.7 (27 Nov 2021 06:19), Max: 98.7 (26 Nov 2021 15:05)  HR: 104 (27 Nov 2021 06:19) (104 - 111)  BP: 116/64 (27 Nov 2021 06:19) (109/65 - 142/77)  BP(mean): --  RR: 17 (27 Nov 2021 06:19) (17 - 17)  SpO2: 99% (27 Nov 2021 06:19) (99% - 100%)    GENERAL: No acute distress, resting in bed  HEAD:  Atraumatic, Normocephalic  EYES: EOMI, PERRLA, conjunctiva and sclera clear  NECK: Supple, no lymphadenopathy, no JVD  CHEST/LUNG: CTAB; No wheezes, rales, or rhonchi  HEART: Regular rate and rhythm; No murmurs, rubs, or gallops  ABDOMEN: Soft, non-tender, non-distended; normal bowel sounds, no organomegaly  EXTREMITIES:  2+ peripheral pulses b/l, No clubbing, cyanosis, or edema  NEUROLOGY: A&O to person and place, no focal deficits  SKIN: No rashes or lesions    LABS:                        11.2   7.25  )-----------( 224      ( 26 Nov 2021 06:45 )             34.2     11-26    146<H>  |  112<H>  |  13  ----------------------------<  107<H>  4.0   |  21<L>  |  0.76    Ca    9.6      26 Nov 2021 06:45  Phos  3.4     11-26  Mg     1.90     11-26    TPro  7.5  /  Alb  3.7  /  TBili  0.3  /  DBili  x   /  AST  21  /  ALT  6   /  AlkPhos  93  11-26                RADIOLOGY & ADDITIONAL TESTS:  Results Reviewed:   Imaging Personally Reviewed:  Electrocardiogram Personally Reviewed:    COORDINATION OF CARE:  Care Discussed with Consultants/Other Providers [Y/N]:  Prior or Outpatient Records Reviewed [Y/N]:

## 2021-11-28 LAB
GLUCOSE BLDC GLUCOMTR-MCNC: 149 MG/DL — HIGH (ref 70–99)
GLUCOSE BLDC GLUCOMTR-MCNC: 156 MG/DL — HIGH (ref 70–99)
GLUCOSE BLDC GLUCOMTR-MCNC: 177 MG/DL — HIGH (ref 70–99)

## 2021-11-28 PROCEDURE — 99232 SBSQ HOSP IP/OBS MODERATE 35: CPT | Mod: GC

## 2021-11-28 RX ORDER — PETROLATUM,WHITE
1 JELLY (GRAM) TOPICAL
Refills: 0 | Status: DISCONTINUED | OUTPATIENT
Start: 2021-11-28 | End: 2021-12-07

## 2021-11-28 RX ADMIN — CARBIDOPA AND LEVODOPA 1 TABLET(S): 25; 100 TABLET ORAL at 13:45

## 2021-11-28 RX ADMIN — Medication 75 MICROGRAM(S): at 05:25

## 2021-11-28 RX ADMIN — LISINOPRIL 10 MILLIGRAM(S): 2.5 TABLET ORAL at 05:24

## 2021-11-28 RX ADMIN — QUETIAPINE FUMARATE 50 MILLIGRAM(S): 200 TABLET, FILM COATED ORAL at 05:26

## 2021-11-28 RX ADMIN — RISPERIDONE 1.5 MILLIGRAM(S): 4 TABLET ORAL at 05:24

## 2021-11-28 RX ADMIN — CARBIDOPA AND LEVODOPA 1 TABLET(S): 25; 100 TABLET ORAL at 05:24

## 2021-11-28 RX ADMIN — Medication 1 MILLIGRAM(S): at 09:36

## 2021-11-28 RX ADMIN — QUETIAPINE FUMARATE 50 MILLIGRAM(S): 200 TABLET, FILM COATED ORAL at 13:45

## 2021-11-28 RX ADMIN — RISPERIDONE 1.5 MILLIGRAM(S): 4 TABLET ORAL at 17:46

## 2021-11-28 RX ADMIN — Medication 0.25 MILLIGRAM(S): at 17:46

## 2021-11-28 RX ADMIN — Medication 1 APPLICATION(S): at 17:47

## 2021-11-28 RX ADMIN — SENNA PLUS 1 TABLET(S): 8.6 TABLET ORAL at 17:45

## 2021-11-28 RX ADMIN — DIVALPROEX SODIUM 750 MILLIGRAM(S): 500 TABLET, DELAYED RELEASE ORAL at 22:00

## 2021-11-28 RX ADMIN — Medication 1 APPLICATION(S): at 17:45

## 2021-11-28 RX ADMIN — ENOXAPARIN SODIUM 40 MILLIGRAM(S): 100 INJECTION SUBCUTANEOUS at 11:38

## 2021-11-28 RX ADMIN — Medication 25 MILLIGRAM(S): at 05:24

## 2021-11-28 RX ADMIN — CARBIDOPA AND LEVODOPA 1 TABLET(S): 25; 100 TABLET ORAL at 22:01

## 2021-11-28 RX ADMIN — LACTULOSE 10 GRAM(S): 10 SOLUTION ORAL at 22:01

## 2021-11-28 RX ADMIN — SENNA PLUS 1 TABLET(S): 8.6 TABLET ORAL at 05:25

## 2021-11-28 RX ADMIN — Medication 1 MILLIGRAM(S): at 21:59

## 2021-11-28 RX ADMIN — Medication 1 APPLICATION(S): at 05:26

## 2021-11-28 RX ADMIN — Medication 25 MILLIGRAM(S): at 17:45

## 2021-11-28 RX ADMIN — Medication 0.25 MILLIGRAM(S): at 05:24

## 2021-11-28 NOTE — PROGRESS NOTE ADULT - PROBLEM SELECTOR PLAN 2
Transferred from [facility]. Decompensated Schizophrenia.     -per discussion with psych, recommended alternating Seroquel 50 mg PRN, Ativan 1mg PRN and Zyprexa 5 mg IM PRN for agitation and holding Ativan if not having any effect due to deliriogenic properties that may be worsening agitation. No Haldol due to Parkinson's disease  - agitated at times, now calm and cooperative   - per psych discussion with Dr. Chung at [facility], patient has been manic, labile, tearful and hyperreligious, consistent with history of schizophrenia, while at [facility]  - currently at baseline mental status. Continues with disorganized thought process, paranoia, Zoroastrianism preoccupations. Off restraints  - continue depakote 750mg qhs and Cogentin 0.5mg BID  - Zyprexa 5.0 mg PO/IM q6h PRN per agitation   - c/w Risperdal 1.5mg BID    - c/w Ativan 1 mg PO BID per psych to avoid withdrawal  - decrease cogentin to 0.25mg BID  - psych rec continuing to hold trazodone and gabapentin due to recent AMS and to avoid polypharmacy    - pt will need inpatient psychiatric treatment for further stabilization per psych Transferred from [facility]. Decompensated Schizophrenia. w/ disorganized thought process, paranoia, Mormonism preoccupations    -psych rec continuing Seroquel 50 mg PRN, Ativan 1mg PRN and Zyprexa 5 mg IM PRN for agitation and holding Ativan if not having any effect due to deliriogenic properties that may be worsening agitation. No Haldol due to Parkinson's disease  - agitated at times, now calm and cooperative   - per psych discussion with Dr. Chung at [facility], patient has been manic, labile, tearful and hyperreligious while at [facility]  - continue depakote 750mg qhs and Cogentin 0.5mg BID  - c/w Risperdal 1.5mg BID    - c/w Ativan 1 mg PO BID per psych to avoid withdrawal  - decrease cogentin to 0.25mg BID  - psych rec continuing to hold trazodone and gabapentin due to recent AMS and to avoid polypharmacy    - pt will need inpatient psychiatric treatment for further stabilization per psych

## 2021-11-28 NOTE — PROGRESS NOTE ADULT - SUBJECTIVE AND OBJECTIVE BOX
PROGRESS NOTE:   Authored by Dr. Rosa Estrada MD (PGY-1). Pager [facility] [phone]/ [facility]     Patient is a 70y old  Female who presents with a chief complaint of AMS (23 Nov 2021 13:45)      SUBJECTIVE / OVERNIGHT EVENTS:  Pt agitated overnight and placed in restraints    ADDITIONAL REVIEW OF SYSTEMS:  Patient denies fevers, chills, chest pain, shortness of breath, nausea, abdominal pain, diarrhea, constipation, dysuria, leg swelling, headache, light headedness.    MEDICATIONS  (STANDING):  ammonium lactate   12% Cream 1 Application(s) Topical two times a day  benztropine 0.25 milliGRAM(s) Oral two times a day  carbidopa/levodopa  25/100 1 Tablet(s) Oral three times a day  diVALproex Sprinkle 750 milliGRAM(s) Oral at bedtime  enoxaparin Injectable 40 milliGRAM(s) SubCutaneous daily  hydrocortisone 1% Cream 1 Application(s) Topical every 12 hours  influenza  Vaccine (HIGH DOSE) 0.7 milliLiter(s) IntraMuscular once  lactulose Syrup 10 Gram(s) Oral at bedtime  levothyroxine 75 MICROGram(s) Oral daily  lisinopril 10 milliGRAM(s) Oral daily  LORazepam     Tablet 1 milliGRAM(s) Oral <User Schedule>  metoprolol tartrate 25 milliGRAM(s) Oral two times a day  risperiDONE   Tablet 1.5 milliGRAM(s) Oral two times a day  senna 1 Tablet(s) Oral two times a day    MEDICATIONS  (PRN):  OLANZapine 2.5 milliGRAM(s) Oral once PRN severe breakthrough agitation  OLANZapine Injectable 5 milliGRAM(s) IntraMuscular every 6 hours PRN agitation  QUEtiapine 50 milliGRAM(s) Oral every 6 hours PRN agitation      CAPILLARY BLOOD GLUCOSE      POCT Blood Glucose.: 212 mg/dL (27 Nov 2021 23:13)  POCT Blood Glucose.: 78 mg/dL (27 Nov 2021 09:22)    I&O's Summary      PHYSICAL EXAM:  Vital Signs Last 24 Hrs  T(C): 36.6 (28 Nov 2021 06:30), Max: 36.9 (28 Nov 2021 02:00)  T(F): 97.8 (28 Nov 2021 06:30), Max: 98.4 (28 Nov 2021 02:00)  HR: 81 (28 Nov 2021 06:30) (74 - 120)  BP: 137/74 (28 Nov 2021 06:30) (137/74 - 167/89)  BP(mean): --  RR: 18 (28 Nov 2021 06:30) (16 - 18)  SpO2: 100% (28 Nov 2021 06:30) (98% - 100%)    CONSTITUTIONAL: NAD, calm, lethargic, resting comfortably in bed  HEET: MMM, EOMI, PERRLA  NECK: supple  RESPIRATORY: Normal respiratory effort; lungs CTAB  CARDIOVASCULAR: RRR, normal S1 and S2, no murmurs, rubs or gallops  ABDOMEN: distended, soft, nontender, bowel sounds present    MUSCULOSKELETAL: no clubbing or cyanosis of digits; no joint swelling or tenderness to palpation  NEURO: Moving all four extremities  PSYCH calm, cooperative, disorganized thought process, hard to redirect, poor insight, denies SI, HI, AVH  SKIN: No rash    LABS:                      Tele Reviewed:    RADIOLOGY & ADDITIONAL TESTS:  Results Reviewed:   Imaging Personally Reviewed:  Electrocardiogram Personally Reviewed:     PROGRESS NOTE:   Authored by Dr. Rosa Estrada MD (PGY-1). Pager [facility] [phone]/ [facility]     Patient is a 70y old  Female who presents with a chief complaint of AMS (23 Nov 2021 13:45)      SUBJECTIVE / OVERNIGHT EVENTS:  Pt with agitation requiring restraints overnight      BM yesterday. Denies chest pain, abdominal pain    ADDITIONAL REVIEW OF SYSTEMS:  Patient denies fevers, chills, chest pain, shortness of breath, nausea, abdominal pain, diarrhea, constipation, dysuria, leg swelling, headache, light headedness.    MEDICATIONS  (STANDING):  ammonium lactate   12% Cream 1 Application(s) Topical two times a day  benztropine 0.25 milliGRAM(s) Oral two times a day  carbidopa/levodopa  25/100 1 Tablet(s) Oral three times a day  diVALproex Sprinkle 750 milliGRAM(s) Oral at bedtime  enoxaparin Injectable 40 milliGRAM(s) SubCutaneous daily  hydrocortisone 1% Cream 1 Application(s) Topical every 12 hours  influenza  Vaccine (HIGH DOSE) 0.7 milliLiter(s) IntraMuscular once  lactulose Syrup 10 Gram(s) Oral at bedtime  levothyroxine 75 MICROGram(s) Oral daily  lisinopril 10 milliGRAM(s) Oral daily  LORazepam     Tablet 1 milliGRAM(s) Oral <User Schedule>  metoprolol tartrate 25 milliGRAM(s) Oral two times a day  risperiDONE   Tablet 1.5 milliGRAM(s) Oral two times a day  senna 1 Tablet(s) Oral two times a day    MEDICATIONS  (PRN):  OLANZapine 2.5 milliGRAM(s) Oral once PRN severe breakthrough agitation  OLANZapine Injectable 5 milliGRAM(s) IntraMuscular every 6 hours PRN agitation  QUEtiapine 50 milliGRAM(s) Oral every 6 hours PRN agitation      CAPILLARY BLOOD GLUCOSE      POCT Blood Glucose.: 212 mg/dL (27 Nov 2021 23:13)  POCT Blood Glucose.: 78 mg/dL (27 Nov 2021 09:22)    I&O's Summary      PHYSICAL EXAM:  Vital Signs Last 24 Hrs  T(C): 36.6 (28 Nov 2021 06:30), Max: 36.9 (28 Nov 2021 02:00)  T(F): 97.8 (28 Nov 2021 06:30), Max: 98.4 (28 Nov 2021 02:00)  HR: 81 (28 Nov 2021 06:30) (74 - 120)  BP: 137/74 (28 Nov 2021 06:30) (137/74 - 167/89)  BP(mean): --  RR: 18 (28 Nov 2021 06:30) (16 - 18)  SpO2: 100% (28 Nov 2021 06:30) (98% - 100%)    CONSTITUTIONAL: NAD, calm, lethargic, resting comfortably in bed  HEET: DMM, EOMI, PERRLA  NECK: supple  RESPIRATORY: Normal respiratory effort; lungs CTAB  CARDIOVASCULAR: RRR, normal S1 and S2, no murmurs, rubs or gallops  ABDOMEN: distended, soft, nontender, bowel sounds present    MUSCULOSKELETAL: no clubbing or cyanosis of digits; no joint swelling or tenderness to palpation  NEURO: Moving all four extremities  PSYCH: alert and oriented to person and month, calm, cooperative, disorganized thought process, hard to redirect, poor insight, denies SI, HI, AVH  SKIN: No rash    LABS:                      Tele Reviewed:    RADIOLOGY & ADDITIONAL TESTS:  Results Reviewed:   Imaging Personally Reviewed:  Electrocardiogram Personally Reviewed:

## 2021-11-28 NOTE — PROGRESS NOTE ADULT - ATTENDING COMMENTS
70W PMH of HTN, T2DM, asthma, Parkinson's disease, seizure disorder, schizophrenia BIBEMS from [facility] for acute change in mental status and episode of hypotension. Stroke code was called in the ED for anisocoria, however this was discovered to be chronic finding. Hypotension and lethargy improved in the ED and admitted to medicine to rule out secondary causes.    # Schizophrenia - remains decompensated and will need transfer back to Psych  ON agitated required seroquel and zyprexa and wrist restraints. f/u psych recs.   # AMS - transient, no definitive inciting cause, suspect medication induced. Patient is on multiple sedative medications prior to arrival.  seen by neurology, racquel, appreciate recs. No need for MRI/EEG. Holding gabapentin and Trazadone. Ativan resumed at lower dose 1mg BID (from TID). Resumed other psychotropic meds given increased agitation/ hallucinations.    Dispo: [facility] unable to accommodate due to 1:1

## 2021-11-28 NOTE — PROGRESS NOTE ADULT - ASSESSMENT
69 yo F with PMH of HTN, T2DM, asthma, seizure disorder, Parkinson's disease, glaucoma, schizophrenia, hemorrhoids BIBEMS from [facility] for acute change in mental status i/s/o hypotension. Likely medication-induced given negative brain imaging and negative metabolic and infectious work up. Waiting for acceptance by inpatient psych facility

## 2021-11-29 LAB
ANION GAP SERPL CALC-SCNC: 17 MMOL/L — HIGH (ref 7–14)
BUN SERPL-MCNC: 12 MG/DL — SIGNIFICANT CHANGE UP (ref 7–23)
CALCIUM SERPL-MCNC: 9.6 MG/DL — SIGNIFICANT CHANGE UP (ref 8.4–10.5)
CHLORIDE SERPL-SCNC: 103 MMOL/L — SIGNIFICANT CHANGE UP (ref 98–107)
CO2 SERPL-SCNC: 17 MMOL/L — LOW (ref 22–31)
CREAT SERPL-MCNC: 0.79 MG/DL — SIGNIFICANT CHANGE UP (ref 0.5–1.3)
GLUCOSE BLDC GLUCOMTR-MCNC: 119 MG/DL — HIGH (ref 70–99)
GLUCOSE SERPL-MCNC: 123 MG/DL — HIGH (ref 70–99)
MAGNESIUM SERPL-MCNC: 2 MG/DL — SIGNIFICANT CHANGE UP (ref 1.6–2.6)
PHOSPHATE SERPL-MCNC: 4 MG/DL — SIGNIFICANT CHANGE UP (ref 2.5–4.5)
POTASSIUM SERPL-MCNC: 4.6 MMOL/L — SIGNIFICANT CHANGE UP (ref 3.5–5.3)
POTASSIUM SERPL-SCNC: 4.6 MMOL/L — SIGNIFICANT CHANGE UP (ref 3.5–5.3)
SODIUM SERPL-SCNC: 137 MMOL/L — SIGNIFICANT CHANGE UP (ref 135–145)

## 2021-11-29 PROCEDURE — 99231 SBSQ HOSP IP/OBS SF/LOW 25: CPT | Mod: GC

## 2021-11-29 PROCEDURE — 99233 SBSQ HOSP IP/OBS HIGH 50: CPT

## 2021-11-29 RX ORDER — OLANZAPINE 15 MG/1
2.5 TABLET, FILM COATED ORAL EVERY 6 HOURS
Refills: 0 | Status: DISCONTINUED | OUTPATIENT
Start: 2021-11-29 | End: 2021-12-07

## 2021-11-29 RX ADMIN — Medication 1 APPLICATION(S): at 18:13

## 2021-11-29 RX ADMIN — RISPERIDONE 1.5 MILLIGRAM(S): 4 TABLET ORAL at 06:52

## 2021-11-29 RX ADMIN — Medication 1 APPLICATION(S): at 18:36

## 2021-11-29 RX ADMIN — CARBIDOPA AND LEVODOPA 1 TABLET(S): 25; 100 TABLET ORAL at 14:00

## 2021-11-29 RX ADMIN — Medication 1 APPLICATION(S): at 06:55

## 2021-11-29 RX ADMIN — QUETIAPINE FUMARATE 50 MILLIGRAM(S): 200 TABLET, FILM COATED ORAL at 06:52

## 2021-11-29 RX ADMIN — Medication 75 MICROGRAM(S): at 06:54

## 2021-11-29 RX ADMIN — SENNA PLUS 1 TABLET(S): 8.6 TABLET ORAL at 06:54

## 2021-11-29 RX ADMIN — CARBIDOPA AND LEVODOPA 1 TABLET(S): 25; 100 TABLET ORAL at 06:53

## 2021-11-29 RX ADMIN — LISINOPRIL 10 MILLIGRAM(S): 2.5 TABLET ORAL at 06:52

## 2021-11-29 RX ADMIN — CARBIDOPA AND LEVODOPA 1 TABLET(S): 25; 100 TABLET ORAL at 22:10

## 2021-11-29 RX ADMIN — DIVALPROEX SODIUM 750 MILLIGRAM(S): 500 TABLET, DELAYED RELEASE ORAL at 22:11

## 2021-11-29 RX ADMIN — Medication 0.25 MILLIGRAM(S): at 06:50

## 2021-11-29 RX ADMIN — LACTULOSE 10 GRAM(S): 10 SOLUTION ORAL at 22:10

## 2021-11-29 RX ADMIN — RISPERIDONE 1.5 MILLIGRAM(S): 4 TABLET ORAL at 18:15

## 2021-11-29 RX ADMIN — Medication 1 MILLIGRAM(S): at 09:30

## 2021-11-29 RX ADMIN — SENNA PLUS 1 TABLET(S): 8.6 TABLET ORAL at 18:15

## 2021-11-29 RX ADMIN — Medication 0.25 MILLIGRAM(S): at 18:15

## 2021-11-29 RX ADMIN — Medication 1 APPLICATION(S): at 07:00

## 2021-11-29 RX ADMIN — Medication 25 MILLIGRAM(S): at 06:51

## 2021-11-29 RX ADMIN — Medication 25 MILLIGRAM(S): at 18:15

## 2021-11-29 NOTE — DIETITIAN INITIAL EVALUATION ADULT. - PROBLEM SELECTOR PLAN 2
Transferred from [facility]. Disorganized with disorganized speech. AT baseline mental status. Following commands No SI, AVH    -s/p olanzapine 2.5 mg IM x1 and Versed 5 mg IM x1 in ED for agitation.  -continue depakote 750mg qhs and Cogentin 0.5mg BID  -Per psych, holding trazodone, gabapentin, and Ativan given deliriogenic properties in setting of change in mental status  -Zyprexa 2.5mg PO/IM q6h PRN per agitation   -restart Risperdal 1.5mg BID standing if no objections from neurology

## 2021-11-29 NOTE — DIETITIAN INITIAL EVALUATION ADULT. - PERTINENT MEDS FT
MEDICATIONS  (STANDING):  ammonium lactate   12% Cream 1 Application(s) Topical two times a day  benztropine 0.25 milliGRAM(s) Oral two times a day  carbidopa/levodopa  25/100 1 Tablet(s) Oral three times a day  diVALproex Sprinkle 750 milliGRAM(s) Oral at bedtime  enoxaparin Injectable 40 milliGRAM(s) SubCutaneous daily  hydrocortisone 1% Cream 1 Application(s) Topical every 12 hours  influenza  Vaccine (HIGH DOSE) 0.7 milliLiter(s) IntraMuscular once  lactulose Syrup 10 Gram(s) Oral at bedtime  levothyroxine 75 MICROGram(s) Oral daily  lisinopril 10 milliGRAM(s) Oral daily  LORazepam     Tablet 1 milliGRAM(s) Oral <User Schedule>  metoprolol tartrate 25 milliGRAM(s) Oral two times a day  petrolatum white Ointment 1 Application(s) Topical two times a day  risperiDONE   Tablet 1.5 milliGRAM(s) Oral two times a day  senna 1 Tablet(s) Oral two times a day    MEDICATIONS  (PRN):  OLANZapine 2.5 milliGRAM(s) Oral once PRN severe breakthrough agitation  OLANZapine Injectable 5 milliGRAM(s) IntraMuscular every 6 hours PRN agitation  QUEtiapine 50 milliGRAM(s) Oral every 6 hours PRN agitation

## 2021-11-29 NOTE — PROGRESS NOTE ADULT - PROBLEM SELECTOR PLAN 2
Transferred from [facility]. Decompensated Schizophrenia. w/ disorganized thought process, paranoia, Oriental orthodox preoccupations    -psych rec continuing Seroquel 50 mg PRN, Ativan 1mg PRN and Zyprexa 5 mg IM PRN for agitation and holding Ativan if not having any effect due to deliriogenic properties that may be worsening agitation. No Haldol due to Parkinson's disease  - agitated at times, now calm and cooperative   - per psych discussion with Dr. Chung at [facility], patient has been manic, labile, tearful and hyperreligious while at [facility]  - continue depakote 750mg qhs and Cogentin 0.5mg BID  - c/w Risperdal 1.5mg BID    - c/w Ativan 1 mg PO BID per psych to avoid withdrawal  - decrease cogentin to 0.25mg BID  - psych rec continuing to hold trazodone and gabapentin due to recent AMS and to avoid polypharmacy    - pt will need inpatient psychiatric treatment for further stabilization per psych Transferred from [facility]. Decompensated Schizophrenia. w/ disorganized thought process, paranoia, Zoroastrian preoccupations    -psych rec continuing Seroquel 50 mg q6 PRN, Ativan 1mg PRN (for severe agitation) and Zyprexa 2.5 mg IM q6 PRN for agitation and holding Ativan if not having any effect due to deliriogenic properties that may be worsening agitation. No Haldol due to Parkinson's disease  - agitated at times, now calm and cooperative   - per psych discussion with Dr. Chung at [facility], patient has been manic, labile, tearful and hyperreligious while at [facility]  - continue depakote 750mg qhs and Cogentin 0.5mg BID  - c/w Risperdal 1.5mg BID    - c/w Ativan 1 mg PO BID per psych to avoid withdrawal  - decrease cogentin to 0.25mg BID  - psych rec continuing to hold trazodone and gabapentin due to recent AMS and to avoid polypharmacy    - pt will need inpatient psychiatric treatment for further stabilization per psych

## 2021-11-29 NOTE — PROGRESS NOTE ADULT - ATTENDING COMMENTS
70W PMH of HTN, T2DM, asthma, Parkinson's disease, seizure disorder, schizophrenia BIBEMS from [facility] for acute change in mental status and episode of hypotension. Stroke code was called in the ED for anisocoria, however this was discovered to be chronic finding. Hypotension and lethargy improved in the ED and admitted to medicine to rule out secondary causes.    # Schizophrenia - remains decompensated and will need inpatient psych facility on discharge.   # AMS - transient, no definitive inciting cause, suspect medication induced. Patient is on multiple sedative medications prior to arrival.  seen by neurology, racquel, marah zayas. No need for MRI/EEG. Holding gabapentin and Trazadone. Ativan resumed at lower dose 1mg BID (from TID). Resumed other psychotropic meds given increased agitation/ hallucinations.    Dispo: unclear

## 2021-11-29 NOTE — DIETITIAN INITIAL EVALUATION ADULT. - PROBLEM SELECTOR PLAN 8
-Diet: passed dysphagia screen, however slow to swallow per RN. will start pureed diet  -VTE ppx: lovenox 40 mg subQ daily  -Dispo: [facility] once medically stabl

## 2021-11-29 NOTE — BH CONSULTATION LIAISON PROGRESS NOTE - NSBHFUPINTERVALHXFT_PSY_A_CORE
Chart reviewed. Pt seen with 1:1, AA ox 3 though disorganized, impulsive, softly paranoid. No hallucinations. No focal neuro deficits noted on exam. Insight rather limited/poor.

## 2021-11-29 NOTE — PROGRESS NOTE ADULT - ASSESSMENT
71 yo F with PMH of HTN, T2DM, asthma, seizure disorder, Parkinson's disease, glaucoma, schizophrenia, hemorrhoids BIBEMS from [facility] for acute change in mental status i/s/o hypotension. Likely medication-induced given negative brain imaging and negative metabolic and infectious work up. Waiting for acceptance by inpatient psych facility

## 2021-11-29 NOTE — DIETITIAN INITIAL EVALUATION ADULT. - ADD RECOMMEND
1. Continue current diet order, which remains appropriate at this time. Defer diet consistency/texture to MD order. 2. Add Glucerna Therapeutic Nutrition Shake 240mls 3x daily (660kcals, 30g protein). 3. May consider daily Multivitamin for micronutrient coverage. 4. Monitor weights, labs, BM's, skin integrity, PO intake/tolerance. 5. Please consult to SLP for swallow eval if patient no longer tolerated current diet consistency.

## 2021-11-29 NOTE — DIETITIAN INITIAL EVALUATION ADULT. - OTHER INFO
Per chart: 71 yo F with PMH of HTN, T2DM, asthma, seizure disorder, Parkinson's disease, glaucoma, schizophrenia, hemorrhoids BIBEMS from [facility] for acute change in mental status i/s/o hypotension. Likely medication-induced given negative brain imaging and negative metabolic and infectious work up. Waiting for acceptance by inpatient psych facility.    RDN unable to interview with patient and obtain pertinent information due to AMS with A&O x1 noted. No family present at visit today. Collateral information obtained from patient's medical chart and nursing. Per 1:1 observer/PCA at bedside, patient requires feeding assistance at meals, noticed patient with missing upper teeth but  tolerated diet well with no chewing/swallowing difficulties on current pureed diet per MD order. PO intake % noted, with good po 100% at breakfast this AM -- confirmed with PCA. No reports of GI distress (nausea/vomiting/diarrhea/constipation) at this time. Noted hx of DM, HgbA1C 5.6% (10/15), glucose 123 today, DM diet education is not appropriate at this time due to AMS, will monitor lab trend.    UBW uncertain at this time. Per wt hx: 131# (10/14/21) --> current wt 128lb/58kg (11/21/21). Wt loss possibly related to inadequate po intake 2/2 AMS. May benefit from adding oral nutrition supplement to optimize po.     Skin: + MAD to sacral/gluteal    Edema: None noted.

## 2021-11-29 NOTE — DIETITIAN INITIAL EVALUATION ADULT. - PERSON TAUGHT/METHOD
11/24/2020       RE: Speedy Carlson  [address] [facility] [id number]     Dear Colleague,    Thank you for referring your patient, Speedy Carlson, to the [facility] [place] at [facility]. Please see a copy of my visit note below.    CC: tinnitus    HPI: Patient reports that he is having bilateral tinnitus that is a 9 out of 10.  It is very bothersome and very distracting for the patient.  Patient reports that it is causing him to be very stressed out and anxious.  In addition to the tinnitus he has had some right ear discomfort.  Initially after surgery it was very sharp and painful.  The pain has gotten better.  Finally he is concerned about the wire in his neck.  It feels very tight to him.  He also feels like when he looks up he feels a lot of tension and stretching through his neck.  He is wondering if the wire itself is causing the tinnitus.  In terms of the tinnitus he reports that he did not have significant tinnitus prior to the surgery.  He reports minimal stress prior to surgery.  He reports no change to his hearing.  The tinnitus does not prevent him from falling asleep.  He reports he is actually able to fall asleep without any difficulty.  He does hear it first thing in the morning when he wakes up.    PE;  GEN: nad  EARS: Right ear canal appears normal.  No significant wax.  Right tympanic membrane is intact.  No middle ear fluid.  NECK: When the patient looks up you can see the outline of the wire.  He does have good range of motion of his neck and up and down.    A/P:  Patient's main complaint today is tinnitus.  I discussed with him that I do not know if the surgery itself caused the tinnitus but it may have played a factor.  I think the patient's tinnitus is mostly being exacerbated by anxiety and stress.  This then makes the tinnitus louder which causes him to have more anxiety and stress and he is in a  bad biofeedback loop.  At one point patient was wondering if he could have the device taken out to if this would make the tinnitus better.  I discussed with him that I do not know for sure if removing the implant would make his tinnitus better and therefore I would want him to work through some more conservative management first.  Additionally we took the device out we likely would not be able to reimplant the same side.  I discussed with the patient using an anxiolytic such as Ativan for very short-term basis to see if this would help decrease some of his anxiety.  I think doing some massage therapy would help with both his stress and attention and he may also help a little bit with his neck tension which I think is why he feels a lot of stretching through his neck.  He can also use warm heat around his jaw and his ear to see if that helps.  We talked about masking strategies with a fan or other noise particularly during the day and when he feels the tinnitus is very loud.  Use a fan at night to see if that helps him first thing in the morning.  Patient is agreeing to cognitive behavioral therapy to try to help with the tinnitus.  I will also get a audiogram with the patient comes back and see me see in 2 weeks.  Patient I also discussed delaying his activation.  He is concerned that it might exacerbate his tinnitus and at this time I think I would agree.  I will send a message to sleep medicine department to cancel his activation appointment and I will update them once we feel like we have a better control of the tinnitus before we activate him.    I spent a total of 25 minutes face-to-face with Speedy Carlson during today's office visit.  Over 50% of this time was spent counseling the patient on and/or coordinating care as documented in my assessment and plan.        Again, thank you for allowing me to participate in the care of your patient.      Sincerely,    Gladys Marroquin MD       verbal instruction/patient instructed

## 2021-11-29 NOTE — BH CONSULTATION LIAISON PROGRESS NOTE - NSBHCHARTREVIEWVS_PSY_A_CORE FT
Vital Signs Last 24 Hrs  T(C): 36.3 (29 Nov 2021 07:02), Max: 37.1 (28 Nov 2021 14:49)  T(F): 97.4 (29 Nov 2021 07:02), Max: 98.8 (28 Nov 2021 14:49)  HR: 88 (29 Nov 2021 07:02) (81 - 120)  BP: 150/72 (29 Nov 2021 07:02) (105/65 - 167/76)  BP(mean): --  RR: 17 (29 Nov 2021 07:02) (17 - 17)  SpO2: 98% (29 Nov 2021 07:02) (98% - 100%)

## 2021-11-29 NOTE — BH CONSULTATION LIAISON PROGRESS NOTE - MSE UNSTRUCTURED FT
Pt seen with 1:1, AA ox 3 though disorganized, impulsive, softly paranoid. No hallucinations. No focal neuro deficits noted on exam. Insight rather limited/poor.

## 2021-11-29 NOTE — PROGRESS NOTE ADULT - SUBJECTIVE AND OBJECTIVE BOX
PROGRESS NOTE:   Authored by Melony Chadwick MD  Internal Medicine  Pager YING [phone]  Pager [phone]    Patient is a 70y old  Female who presents with a chief complaint of AMS (23 Nov 2021 13:45)      SUBJECTIVE / OVERNIGHT EVENTS: was calmer overnight with new med regimen, d/c'ed restraints    MEDICATIONS  (STANDING):  ammonium lactate   12% Cream 1 Application(s) Topical two times a day  benztropine 0.25 milliGRAM(s) Oral two times a day  carbidopa/levodopa  25/100 1 Tablet(s) Oral three times a day  diVALproex Sprinkle 750 milliGRAM(s) Oral at bedtime  enoxaparin Injectable 40 milliGRAM(s) SubCutaneous daily  hydrocortisone 1% Cream 1 Application(s) Topical every 12 hours  influenza  Vaccine (HIGH DOSE) 0.7 milliLiter(s) IntraMuscular once  lactulose Syrup 10 Gram(s) Oral at bedtime  levothyroxine 75 MICROGram(s) Oral daily  lisinopril 10 milliGRAM(s) Oral daily  LORazepam     Tablet 1 milliGRAM(s) Oral <User Schedule>  metoprolol tartrate 25 milliGRAM(s) Oral two times a day  petrolatum white Ointment 1 Application(s) Topical two times a day  risperiDONE   Tablet 1.5 milliGRAM(s) Oral two times a day  senna 1 Tablet(s) Oral two times a day    MEDICATIONS  (PRN):  OLANZapine 2.5 milliGRAM(s) Oral once PRN severe breakthrough agitation  OLANZapine Injectable 5 milliGRAM(s) IntraMuscular every 6 hours PRN agitation  QUEtiapine 50 milliGRAM(s) Oral every 6 hours PRN agitation      CAPILLARY BLOOD GLUCOSE      POCT Blood Glucose.: 177 mg/dL (28 Nov 2021 23:55)  POCT Blood Glucose.: 149 mg/dL (28 Nov 2021 22:08)  POCT Blood Glucose.: 156 mg/dL (28 Nov 2021 11:30)    I&O's Summary      PHYSICAL EXAM:  Vital Signs Last 24 Hrs  T(C): 36.3 (29 Nov 2021 07:02), Max: 37.1 (28 Nov 2021 14:49)  T(F): 97.4 (29 Nov 2021 07:02), Max: 98.8 (28 Nov 2021 14:49)  HR: 88 (29 Nov 2021 07:02) (81 - 120)  BP: 150/72 (29 Nov 2021 07:02) (105/65 - 167/76)  BP(mean): --  RR: 17 (29 Nov 2021 07:02) (17 - 17)  SpO2: 98% (29 Nov 2021 07:02) (98% - 100%)  CONSTITUTIONAL: Well-groomed, in no apparent distress  EYES: No conjunctival or scleral injection, non-icteric;   ENMT: No external nasal lesions; MMM  NECK: Trachea midline without palpable neck mass; thyroid not enlarged and non-tender  RESPIRATORY: Breathing comfortably; no dullness to percussion; lungs CTA without wheeze/rhonchi/rales  CARDIOVASCULAR: +S1S2, RRR, no M/G/R; pedal pulses full and symmetric; no lower extremity edema  GASTROINTESTINAL: No palpable masses or tenderness, +BS throughout, no rebound/guarding; no hepatosplenomegaly; no hernia palpated  LYMPHATIC: No cervical LAD or tenderness  SKIN: No rashes or ulcers noted  NEUROLOGIC: CN II-XII intact; sensation intact in LEs b/l to light touch  PSYCHIATRIC: A+O x 3; mood and affect appropriate; appropriate insight and judgment    LABS:                      RADIOLOGY & ADDITIONAL TESTS:  Results Reviewed:   Imaging Personally Reviewed:  Electrocardiogram Personally Reviewed:    COORDINATION OF CARE:  Care Discussed with Consultants/Other Providers [Y/N]:  Prior or Outpatient Records Reviewed [Y/N]:   PROGRESS NOTE:   Authored by Melony Chadwick MD  Internal Medicine  Pager YING [phone]  Pager [phone]    Patient is a 70y old  Female who presents with a chief complaint of AMS (23 Nov 2021 13:45)      SUBJECTIVE / OVERNIGHT EVENTS: was calmer overnight with new med regimen, d/c'ed restraints. This morning patient reports that she feels constipated because she doesn't want to eat her pureed diet.     MEDICATIONS  (STANDING):  ammonium lactate   12% Cream 1 Application(s) Topical two times a day  benztropine 0.25 milliGRAM(s) Oral two times a day  carbidopa/levodopa  25/100 1 Tablet(s) Oral three times a day  diVALproex Sprinkle 750 milliGRAM(s) Oral at bedtime  enoxaparin Injectable 40 milliGRAM(s) SubCutaneous daily  hydrocortisone 1% Cream 1 Application(s) Topical every 12 hours  influenza  Vaccine (HIGH DOSE) 0.7 milliLiter(s) IntraMuscular once  lactulose Syrup 10 Gram(s) Oral at bedtime  levothyroxine 75 MICROGram(s) Oral daily  lisinopril 10 milliGRAM(s) Oral daily  LORazepam     Tablet 1 milliGRAM(s) Oral <User Schedule>  metoprolol tartrate 25 milliGRAM(s) Oral two times a day  petrolatum white Ointment 1 Application(s) Topical two times a day  risperiDONE   Tablet 1.5 milliGRAM(s) Oral two times a day  senna 1 Tablet(s) Oral two times a day    MEDICATIONS  (PRN):  OLANZapine 2.5 milliGRAM(s) Oral once PRN severe breakthrough agitation  OLANZapine Injectable 5 milliGRAM(s) IntraMuscular every 6 hours PRN agitation  QUEtiapine 50 milliGRAM(s) Oral every 6 hours PRN agitation      CAPILLARY BLOOD GLUCOSE      POCT Blood Glucose.: 177 mg/dL (28 Nov 2021 23:55)  POCT Blood Glucose.: 149 mg/dL (28 Nov 2021 22:08)  POCT Blood Glucose.: 156 mg/dL (28 Nov 2021 11:30)    I&O's Summary      PHYSICAL EXAM:  Vital Signs Last 24 Hrs  T(C): 36.3 (29 Nov 2021 07:02), Max: 37.1 (28 Nov 2021 14:49)  T(F): 97.4 (29 Nov 2021 07:02), Max: 98.8 (28 Nov 2021 14:49)  HR: 88 (29 Nov 2021 07:02) (81 - 120)  BP: 150/72 (29 Nov 2021 07:02) (105/65 - 167/76)  BP(mean): --  RR: 17 (29 Nov 2021 07:02) (17 - 17)  SpO2: 98% (29 Nov 2021 07:02) (98% - 100%)  CONSTITUTIONAL: Well-groomed, in no apparent distress, calm sitting on side of bed  EYES: No conjunctival or scleral injection, non-icteric;   ENMT: No external nasal lesions; MMM  NECK: Trachea midline without palpable neck mass; thyroid not enlarged and non-tender  RESPIRATORY: Breathing comfortably; no dullness to percussion; lungs CTA without wheeze/rhonchi/rales  CARDIOVASCULAR: +S1S2, RRR, no M/G/R; pedal pulses full and symmetric; no lower extremity edema  GASTROINTESTINAL: No palpable masses or tenderness, +BS throughout, no rebound/guarding; no hepatosplenomegaly; no hernia palpated  LYMPHATIC: No cervical LAD or tenderness  SKIN: No rashes or ulcers noted  NEUROLOGIC: CN II-XII intact; sensation intact in LEs b/l to light touch  PSYCHIATRIC: A+O x 3; mood and affect appropriate; appropriate insight and judgment    LABS:                      RADIOLOGY & ADDITIONAL TESTS:  Results Reviewed:   Imaging Personally Reviewed:  Electrocardiogram Personally Reviewed:    COORDINATION OF CARE:  Care Discussed with Consultants/Other Providers [Y/N]:  Prior or Outpatient Records Reviewed [Y/N]:   PROGRESS NOTE:   Authored by Melony Chadwick MD  Internal Medicine  Pager YING [phone]  Pager [phone]    Patient is a 70y old  Female who presents with a chief complaint of AMS (23 Nov 2021 13:45)      SUBJECTIVE / OVERNIGHT EVENTS: was calmer overnight with new med regimen, d/c'ed restraints. This morning patient reports that she feels constipated because she doesn't want to eat her pureed diet.     MEDICATIONS  (STANDING):  ammonium lactate   12% Cream 1 Application(s) Topical two times a day  benztropine 0.25 milliGRAM(s) Oral two times a day  carbidopa/levodopa  25/100 1 Tablet(s) Oral three times a day  diVALproex Sprinkle 750 milliGRAM(s) Oral at bedtime  enoxaparin Injectable 40 milliGRAM(s) SubCutaneous daily  hydrocortisone 1% Cream 1 Application(s) Topical every 12 hours  influenza  Vaccine (HIGH DOSE) 0.7 milliLiter(s) IntraMuscular once  lactulose Syrup 10 Gram(s) Oral at bedtime  levothyroxine 75 MICROGram(s) Oral daily  lisinopril 10 milliGRAM(s) Oral daily  LORazepam     Tablet 1 milliGRAM(s) Oral <User Schedule>  metoprolol tartrate 25 milliGRAM(s) Oral two times a day  petrolatum white Ointment 1 Application(s) Topical two times a day  risperiDONE   Tablet 1.5 milliGRAM(s) Oral two times a day  senna 1 Tablet(s) Oral two times a day    MEDICATIONS  (PRN):  OLANZapine 2.5 milliGRAM(s) Oral once PRN severe breakthrough agitation  OLANZapine Injectable 5 milliGRAM(s) IntraMuscular every 6 hours PRN agitation  QUEtiapine 50 milliGRAM(s) Oral every 6 hours PRN agitation      CAPILLARY BLOOD GLUCOSE      POCT Blood Glucose.: 177 mg/dL (28 Nov 2021 23:55)  POCT Blood Glucose.: 149 mg/dL (28 Nov 2021 22:08)  POCT Blood Glucose.: 156 mg/dL (28 Nov 2021 11:30)    I&O's Summary      PHYSICAL EXAM:  Vital Signs Last 24 Hrs  T(C): 36.3 (29 Nov 2021 07:02), Max: 37.1 (28 Nov 2021 14:49)  T(F): 97.4 (29 Nov 2021 07:02), Max: 98.8 (28 Nov 2021 14:49)  HR: 88 (29 Nov 2021 07:02) (81 - 120)  BP: 150/72 (29 Nov 2021 07:02) (105/65 - 167/76)  BP(mean): --  RR: 17 (29 Nov 2021 07:02) (17 - 17)  SpO2: 98% (29 Nov 2021 07:02) (98% - 100%)  CONSTITUTIONAL: Well-groomed, in no apparent distress, calm sitting on side of bed  EYES: No conjunctival or scleral injection, non-icteric;   ENMT: No external nasal lesions; MMM  NECK: Trachea midline without palpable neck mass; thyroid not enlarged and non-tender  RESPIRATORY: Breathing comfortably;  lungs CTA without wheeze/rhonchi/rales  CARDIOVASCULAR: +S1S2, RRR, no M/G/R; pedal pulses full and symmetric; no lower extremity edema  GASTROINTESTINAL: No palpable masses or tenderness, +BS throughout, no rebound/guarding; no hepatosplenomegaly; no hernia palpated  LYMPHATIC: No cervical LAD or tenderness  SKIN: No rashes or ulcers noted  PSYCHIATRIC: A+O, slightly aggressive throughout examination, would grab my hand to force me to listen to heart    LABS:                      RADIOLOGY & ADDITIONAL TESTS:  Results Reviewed:   Imaging Personally Reviewed:  Electrocardiogram Personally Reviewed:    COORDINATION OF CARE:  Care Discussed with Consultants/Other Providers [Y/N]:  Prior or Outpatient Records Reviewed [Y/N]:

## 2021-11-29 NOTE — DIETITIAN INITIAL EVALUATION ADULT. - PROBLEM SELECTOR PLAN 1
Brought in by EMS for increasing lethargy i/s/o hypotension (SBP in 80s). Vitals stable, afebrile. A&Ox3 at baseline. No leukocytosis, UA negative. CXR clear. CT Head, CT perfusion, CTA head and neck negative for acute pathology and significant stenosis. Lactate, ammonia, CK wnl. Unclear etiology. Infectious vs metabolic vs medication induced    -no signs or symptoms of infection  -neuro exam normal  -at baseline mental status  -urine tox positive for benzodiazepines (Ativan PTA)  -neuro following: rec MR head without contrast  -MR head w/o contrast   -f/u TSH, B12, folate, blood cultures  -orthostatic vitals  -passed dysphagia screen in ED, will start pureed diet  -pt reports anisocuria is chronic   -Per psych, holding trazodone, gabapentin, and Ativan given deliriogenic properties in setting of change in mental status

## 2021-11-29 NOTE — BH CONSULTATION LIAISON PROGRESS NOTE - NSBHASSESSMENTFT_PSY_ALL_CORE
Pt is a 71 y/o AAF, domiciled at [facility], long hx of Schizophrenia with multiple past inpatient hospitalizations, last hospitalization started at [facility] 10/13/21, past hx of aggression towards staff and peers, no documented hx of SA, pertinent PMH includes HLD, HTN, Parkinson's dx. Seizure d/o, T2DM, Hypothyroidism, Glaucoma, PVD, Asthma, Bladder Ca. Pt was found unconscious on the floor at [facility] on 11/20/21, BIBEMS from [facility] for acute change in mental status.     Pt is currently being medically managed by the medicine and neurology team for AMS episode. CT head, CTA head and neck and CT perfusion has been done, grossly negative. R/o infectious and metabolic causes of AMS.     Pt care was discussed with Dr. Chung at [facility] on 11/22, patient has been manic, labile, tearful and hyperreligious, consistent with history of schizophrenia, currently decompensated. Pt currently lacks insight, she is paranoid with extremely disorganized thoughts making her a harm to herself and others and requires continued inpatient hospitalization for monitoring and medication reconciliation. Pt has a history of aggression. She is unpredictable and impulsive. Although cooperative today, history of agitation and aggression warrants continued low threshold for PRN and restraints.   Current PRN Zyprexa 2.5mg q6hrs, with additional dose if pt lacks response. Rx was restarted on psychiatric medications due to concern for psychiatric decompensation. Continue Risperidone 1.5mg BID. Continue Ativan 1mg BID. SW aware of insurance issues which does not allow pt to return to [facility] for inpatient care. As per [facility] note, awaiting response from other inpatient hospitals.      Plan:  - CO 1:1 required due to patient lack of insight and extremely disorganized, delusional, risk to self and others - hx of aggression  	- LOW threshold for PRN usage for agitation due to history of aggression  	- Consider restraints if agitation escalates or aggression  - Continue Risperidone 1.5mg BID PO  - Continue benztropine 0.25mg BID  - Continue Ativan 1mg BID PO, HOLD for sedation and monitor pulse ox closely  - Continue Depakote 750mg qHS for seizures, would defer further titration if needed as per primary/neuro team  - If patient agitated and QTc < 500, can use Zyprexa 2.5mg PO/IM q6h PRN per agitation regimen at [facility]. If open to PO c/w current dose of Seroquel 50mg PO Q 6 PRN   - Patient needs inpatient psychiatric admission, 2PC completed, in pt chart     - SW working on other inpatient psychiatric facility due to insurance issues

## 2021-11-29 NOTE — DIETITIAN INITIAL EVALUATION ADULT. - PERTINENT LABORATORY DATA
11-29 Na137 mmol/L Glu 123 mg/dL<H> K+ 4.6 mmol/L Cr  0.79 mg/dL BUN 12 mg/dL 11-29 Phos 4.0 mg/dL 11-26 Alb 3.7 g/dL

## 2021-11-30 DIAGNOSIS — E03.9 HYPOTHYROIDISM, UNSPECIFIED: ICD-10-CM

## 2021-11-30 DIAGNOSIS — F20.9 SCHIZOPHRENIA, UNSPECIFIED: ICD-10-CM

## 2021-11-30 DIAGNOSIS — G40.909 EPILEPSY, UNSPECIFIED, NOT INTRACTABLE, WITHOUT STATUS EPILEPTICUS: ICD-10-CM

## 2021-11-30 DIAGNOSIS — R21 RASH AND OTHER NONSPECIFIC SKIN ERUPTION: ICD-10-CM

## 2021-11-30 LAB
ANION GAP SERPL CALC-SCNC: 9 MMOL/L — SIGNIFICANT CHANGE UP (ref 7–14)
BUN SERPL-MCNC: 21 MG/DL — SIGNIFICANT CHANGE UP (ref 7–23)
CALCIUM SERPL-MCNC: 9.8 MG/DL — SIGNIFICANT CHANGE UP (ref 8.4–10.5)
CHLORIDE SERPL-SCNC: 103 MMOL/L — SIGNIFICANT CHANGE UP (ref 98–107)
CO2 SERPL-SCNC: 26 MMOL/L — SIGNIFICANT CHANGE UP (ref 22–31)
CREAT SERPL-MCNC: 1.02 MG/DL — SIGNIFICANT CHANGE UP (ref 0.5–1.3)
GLUCOSE BLDC GLUCOMTR-MCNC: 153 MG/DL — HIGH (ref 70–99)
GLUCOSE BLDC GLUCOMTR-MCNC: 87 MG/DL — SIGNIFICANT CHANGE UP (ref 70–99)
GLUCOSE BLDC GLUCOMTR-MCNC: 90 MG/DL — SIGNIFICANT CHANGE UP (ref 70–99)
GLUCOSE BLDC GLUCOMTR-MCNC: 93 MG/DL — SIGNIFICANT CHANGE UP (ref 70–99)
GLUCOSE SERPL-MCNC: 133 MG/DL — HIGH (ref 70–99)
LACTATE SERPL-SCNC: 1.4 MMOL/L — SIGNIFICANT CHANGE UP (ref 0.5–2)
MAGNESIUM SERPL-MCNC: 2 MG/DL — SIGNIFICANT CHANGE UP (ref 1.6–2.6)
PHOSPHATE SERPL-MCNC: 4.2 MG/DL — SIGNIFICANT CHANGE UP (ref 2.5–4.5)
POTASSIUM SERPL-MCNC: 4.6 MMOL/L — SIGNIFICANT CHANGE UP (ref 3.5–5.3)
POTASSIUM SERPL-SCNC: 4.6 MMOL/L — SIGNIFICANT CHANGE UP (ref 3.5–5.3)
SODIUM SERPL-SCNC: 138 MMOL/L — SIGNIFICANT CHANGE UP (ref 135–145)
VALPROATE SERPL-MCNC: 20.7 UG/ML — LOW (ref 50–100)

## 2021-11-30 PROCEDURE — 99231 SBSQ HOSP IP/OBS SF/LOW 25: CPT | Mod: GC

## 2021-11-30 PROCEDURE — 99233 SBSQ HOSP IP/OBS HIGH 50: CPT

## 2021-11-30 RX ADMIN — Medication 1 MILLIGRAM(S): at 05:02

## 2021-11-30 RX ADMIN — Medication 0.25 MILLIGRAM(S): at 06:55

## 2021-11-30 RX ADMIN — Medication 25 MILLIGRAM(S): at 18:31

## 2021-11-30 RX ADMIN — Medication 1 APPLICATION(S): at 06:56

## 2021-11-30 RX ADMIN — Medication 0.25 MILLIGRAM(S): at 18:31

## 2021-11-30 RX ADMIN — DIVALPROEX SODIUM 750 MILLIGRAM(S): 500 TABLET, DELAYED RELEASE ORAL at 23:10

## 2021-11-30 RX ADMIN — SENNA PLUS 1 TABLET(S): 8.6 TABLET ORAL at 06:55

## 2021-11-30 RX ADMIN — RISPERIDONE 1.5 MILLIGRAM(S): 4 TABLET ORAL at 18:31

## 2021-11-30 RX ADMIN — Medication 1 APPLICATION(S): at 06:22

## 2021-11-30 RX ADMIN — LISINOPRIL 10 MILLIGRAM(S): 2.5 TABLET ORAL at 06:55

## 2021-11-30 RX ADMIN — CARBIDOPA AND LEVODOPA 1 TABLET(S): 25; 100 TABLET ORAL at 06:54

## 2021-11-30 RX ADMIN — Medication 1 MILLIGRAM(S): at 12:22

## 2021-11-30 RX ADMIN — Medication 75 MICROGRAM(S): at 06:55

## 2021-11-30 RX ADMIN — RISPERIDONE 1.5 MILLIGRAM(S): 4 TABLET ORAL at 06:54

## 2021-11-30 RX ADMIN — Medication 25 MILLIGRAM(S): at 07:23

## 2021-11-30 RX ADMIN — LACTULOSE 10 GRAM(S): 10 SOLUTION ORAL at 23:10

## 2021-11-30 NOTE — BH CONSULTATION LIAISON PROGRESS NOTE - NSBHFUPINTERVALHXFT_PSY_A_CORE
Chart reviewed. Pt seen with 1:1, AA ox 3 though disorganized, impulsive, softly paranoid. No hallucinations. No focal neuro deficits noted on exam. Insight rather limited/poor. Chart reviewed. Pt was with her PCA. Pt is AAO x2 and her thought process is continues to be disorganized with delusional beliefs. The patient mentioned that her uncle is speaking to her and that he looks very feminine. Pt also exhibits paranoia with thoughts of her children being out to harm her. Poor insight, judgement, and impulse control.

## 2021-11-30 NOTE — PROGRESS NOTE ADULT - ATTENDING COMMENTS
70W PMH of HTN, T2DM, asthma, Parkinson's disease, seizure disorder, schizophrenia BIBEMS from [facility] for acute change in mental status and episode of hypotension. Stroke code was called in the ED for anisocoria, however this was discovered to be chronic finding. Hypotension and lethargy improved in the ED and admitted to medicine to rule out secondary causes.    # Schizophrenia - remains decompensated and will need inpatient psych facility on discharge.     Dispo: unclear. SW involved.     Plan discussed with HS1

## 2021-11-30 NOTE — PROGRESS NOTE ADULT - PROBLEM SELECTOR PLAN 5
Hgb a1c 5.6 on 10/15/21. On metformin at home    -monitor BG on BMP daily -Diet: passed dysphagia screen, however slow to swallow per RN. will start pureed diet  -VTE ppx: lovenox 40 mg subQ daily  -Dispo:Pt is medically optimized for discharge. Pending acceptance by inpatient psych facility c/w levothyroxine    -TSH wnl.

## 2021-11-30 NOTE — PROGRESS NOTE ADULT - PROBLEM SELECTOR PLAN 6
c/w depakote -Diet: passed dysphagia screen, however slow to swallow per RN. will start pureed diet  -VTE ppx: lovenox 40 mg subQ daily  -Dispo:Pt is medically optimized for discharge. Pending acceptance by inpatient psych facility

## 2021-11-30 NOTE — PROGRESS NOTE ADULT - PROBLEM SELECTOR PLAN 3
- c/w lisinopril and metoprolol c/w depakote. Hgb a1c 5.6 on 10/15/21. On metformin at home    -monitor BG on BMP daily

## 2021-11-30 NOTE — BH CONSULTATION LIAISON PROGRESS NOTE - CASE SUMMARY
Chart reviewed. Pt seen with trainee. She remains disorganized/loose and paranoid. Denies SI/HI but has very limited insight into her needs and vaguely alludes to problems with her family. She wants to be left alone. + EPS/TD but not severe or limiting. Some deconditioning suggested by walking exam. She warrants either continued transfer to inpatient psych or NH with reab if this is her baseline and doesn't improve much more. Will continue to follow.  Chart reviewed. Pt seen with trainee. She remains disorganized/loose and paranoid. Denies SI/HI but has very limited insight into her needs and vaguely alludes to problems with her family. She wants to be left alone. + EPS/TD but not severe or limiting. Some deconditioning suggested by walking exam. She warrants either continued transfer to inpatient psych or NH with rehab if this is her baseline and doesn't improve much more. Also strongly suggest 7S/Mindful Care Unit; discussed with RN manager. Will continue to follow.

## 2021-11-30 NOTE — PROGRESS NOTE ADULT - PROBLEM SELECTOR PLAN 2
Transferred from [facility]. Decompensated Schizophrenia. w/ disorganized thought process, paranoia, Hindu preoccupations    -psych rec continuing Seroquel 50 mg q6 PRN, Ativan 1mg PRN (for severe agitation) and Zyprexa 2.5 mg IM q6 PRN for agitation and holding Ativan if not having any effect due to deliriogenic properties that may be worsening agitation. No Haldol due to Parkinson's disease  - agitated at times, now calm and cooperative   - per psych discussion with Dr. Chung at [facility], patient has been manic, labile, tearful and hyperreligious while at [facility]  - continue depakote 750mg qhs and Cogentin 0.5mg BID  - c/w Risperdal 1.5mg BID    - c/w Ativan 1 mg PO BID per psych to avoid withdrawal  - decrease cogentin to 0.25mg BID  - psych rec continuing to hold trazodone and gabapentin due to recent AMS and to avoid polypharmacy    - pt will need inpatient psychiatric treatment for further stabilization per psych Hgb a1c 5.6 on 10/15/21. On metformin at home    -monitor BG on BMP daily scaly approx 3 cm rash on dorsal surface of foot with two <1 cm lesions, skin broken, hyperkeratin on perimeter of rash    - ammonium lactate cream not helping  - trial clotrimazole cream  - consider derm consult

## 2021-11-30 NOTE — BH CONSULTATION LIAISON PROGRESS NOTE - NSBHASSESSMENTFT_PSY_ALL_CORE
Pt is a 71 y/o AAF, domiciled at [facility], long hx of Schizophrenia with multiple past inpatient hospitalizations, last hospitalization started at [facility] 10/13/21, past hx of aggression towards staff and peers, no documented hx of SA, pertinent PMH includes HLD, HTN, Parkinson's dx. Seizure d/o, T2DM, Hypothyroidism, Glaucoma, PVD, Asthma, Bladder Ca. Pt was found unconscious on the floor at [facility] on 11/20/21, BIBEMS from [facility] for acute change in mental status.     Pt is currently being medically managed by the medicine and neurology team for AMS episode. CT head, CTA head and neck and CT perfusion has been done, grossly negative. R/o infectious and metabolic causes of AMS.     Pt care was discussed with Dr. Chung at [facility] on 11/22, patient has been manic, labile, tearful and hyperreligious, consistent with history of schizophrenia, currently decompensated. Pt currently lacks insight, she is paranoid with extremely disorganized thoughts making her a harm to herself and others and requires continued inpatient hospitalization for monitoring and medication reconciliation. Pt has a history of aggression. She is unpredictable and impulsive. Although cooperative today, history of agitation and aggression warrants continued low threshold for PRN and restraints.   Current PRN Zyprexa 2.5mg q6hrs, with additional dose if pt lacks response. Rx was restarted on psychiatric medications due to concern for psychiatric decompensation. Continue Risperidone 1.5mg BID. Continue Ativan 1mg BID. SW aware of insurance issues which does not allow pt to return to [facility] for inpatient care. As per [facility] note, awaiting response from other inpatient hospitals.      Plan:  - CO 1:1 required due to patient lack of insight and extremely disorganized, delusional, risk to self and others - hx of aggression  	- LOW threshold for PRN usage for agitation due to history of aggression  	- Consider restraints if agitation escalates or aggression  - Continue Risperidone 1.5mg BID PO  - Continue benztropine 0.25mg BID  - Continue Ativan 1mg BID PO, HOLD for sedation and monitor pulse ox closely  - Continue Depakote 750mg qHS for seizures, would defer further titration if needed as per primary/neuro team  - If patient agitated and QTc < 500, can use Zyprexa 2.5mg PO/IM q6h PRN per agitation regimen at [facility]. If open to PO c/w current dose of Seroquel 50mg PO Q 6 PRN   - Patient needs inpatient psychiatric admission, 2PC completed, in pt chart     - SW working on other inpatient psychiatric facility due to insurance issues     Pt is a 71 y/o AAF, domiciled at [facility], long hx of Schizophrenia with multiple past inpatient hospitalizations, last hospitalization started at [facility] 10/13/21, past hx of aggression towards staff and peers, no documented hx of SA, pertinent PMH includes HLD, HTN, Parkinson's dx. Seizure d/o, T2DM, Hypothyroidism, Glaucoma, PVD, Asthma, Bladder Ca. Pt was found unconscious on the floor at [facility] on 11/20/21, BIBEMS from [facility] for acute change in mental status.     Pt is currently being medically managed by the medicine and neurology team for AMS episode. CT head, CTA head and neck and CT perfusion has been done, grossly negative. R/o infectious and metabolic causes of AMS.     Pt care was discussed with Dr. Chung at [facility] on 11/22, patient has been manic, labile, tearful and hyperreligious, consistent with history of schizophrenia, currently decompensated. Pt currently lacks insight, she is paranoid with extremely disorganized thoughts making her a harm to herself and others and requires continued inpatient hospitalization for monitoring and medication reconciliation. Pt has a history of aggression. She is unpredictable and impulsive. Although cooperative today, history of agitation and aggression warrants continued low threshold for PRN and restraints.     Current PRN Zyprexa 2.5mg q6hrs, with additional dose if pt lacks response. Rx was restarted on psychiatric medications due to concern for psychiatric decompensation. Continue Risperidone 1.5mg BID. Continue Ativan 1mg BID. SW aware of insurance issues which does not allow pt to return to [facility] for inpatient care. As per [facility] note, awaiting response from other inpatient hospitals.      Plan:  - CO 1:1 required due to patient lack of insight and extremely disorganized, delusional, risk to self and others - hx of aggression  	- LOW threshold for PRN usage for agitation due to history of aggression  	- Consider restraints if agitation escalates or aggression  - Continue Risperidone 1.5mg BID PO  - Continue benztropine 0.25mg BID  - Continue Ativan 1mg BID PO, HOLD for sedation and monitor pulse ox closely  - Continue Depakote 750mg qHS for seizures, would defer further titration if needed as per primary/neuro team  - If patient agitated and QTc < 500, can use Zyprexa 2.5mg PO/IM q6h PRN per agitation regimen at [facility]. If open to PO c/w current dose of Seroquel 50mg PO Q 6 PRN   - Patient needs inpatient psychiatric admission, 2PC completed, in pt chart     - SW working on other inpatient psychiatric facility due to insurance issues     Pt is a 71 y/o AAF, domiciled at [facility], long hx of Schizophrenia with multiple past inpatient hospitalizations, last hospitalization started at [facility] 10/13/21, past hx of aggression towards staff and peers, no documented hx of SA, pertinent PMH includes HLD, HTN, Parkinson's dx. Seizure d/o, T2DM, Hypothyroidism, Glaucoma, PVD, Asthma, Bladder Ca. Pt was found unconscious on the floor at [facility] on 11/20/21, BIBEMS from [facility] for acute change in mental status.     Pt is currently being medically managed by the medicine and neurology team for AMS episode. CT head, CTA head and neck and CT perfusion has been done, grossly negative. R/o infectious and metabolic causes of AMS.     Pt care was discussed with Dr. Chung at [facility] on 11/22, patient has been manic, labile, tearful and hyperreligious, consistent with history of schizophrenia, currently decompensated. Pt currently lacks insight, she is paranoid with extremely disorganized thoughts making her a harm to herself and others and requires continued inpatient hospitalization for monitoring and medication reconciliation. Pt has a history of aggression. She is unpredictable and impulsive. Although cooperative today, history of agitation and aggression warrants continued low threshold for PRN and restraints.     Current PRN Zyprexa 2.5mg q6hrs, with additional dose if pt lacks response. Rx was restarted on psychiatric medications due to concern for psychiatric decompensation. Continue Risperidone 1.5mg BID. Continue Ativan 1mg BID. SW aware of insurance issues which does not allow pt to return to [facility] for inpatient care. As per [facility] note, awaiting response from other inpatient hospitals.      Plan:  - CO 1:1 required due to patient lack of insight and extremely disorganized, delusional, risk to self and others - hx of aggression  	- LOW threshold for PRN usage for agitation due to history of aggression  	- Consider restraints if agitation escalates or aggression  - Continue Risperidone 1.5mg BID PO  - Continue Benztropine 0.25mg BID  - Continue Ativan 1mg BID PO, HOLD for sedation and monitor pulse ox closely  - Continue Depakote 750mg qHS for seizures, would defer further titration if needed as per primary/neuro team  - If patient agitated and QTc < 500, can use Zyprexa 2.5mg PO/IM q6h PRN per agitation regimen at [facility]. If open to PO c/w current dose of Seroquel 50mg PO Q 6 PRN   - Consider PT eval (deconditioned? inpatient rehab indicated?)  - Patient needs inpatient psychiatric admission, 2PC completed, in pt chart     - SW working on other inpatient psychiatric facility due to insurance issues     Pt is a 69 y/o AAF, domiciled at [facility], long hx of Schizophrenia with multiple past inpatient hospitalizations, last hospitalization started at [facility] 10/13/21, past hx of aggression towards staff and peers, no documented hx of SA, pertinent PMH includes HLD, HTN, Parkinson's dx. Seizure d/o, T2DM, Hypothyroidism, Glaucoma, PVD, Asthma, Bladder Ca. Pt was found unconscious on the floor at [facility] on 11/20/21, BIBEMS from [facility] for acute change in mental status.     Pt is currently being medically managed by the medicine and neurology team for AMS episode. CT head, CTA head and neck and CT perfusion has been done, grossly negative. R/o infectious and metabolic causes of AMS.     Pt care was discussed with Dr. Chung at [facility] on 11/22, patient has been manic, labile, tearful and hyperreligious, consistent with history of schizophrenia, currently decompensated. Pt currently lacks insight, she is paranoid with extremely disorganized thoughts making her a harm to herself and others and requires continued inpatient hospitalization for monitoring and medication reconciliation. Pt has a history of aggression. She is unpredictable and impulsive. Although cooperative today, history of agitation and aggression warrants continued low threshold for PRN and restraints.     Current PRN Zyprexa 2.5mg q6hrs, with additional dose if pt lacks response. Rx was restarted on psychiatric medications due to concern for psychiatric decompensation. Continue Risperidone 1.5mg BID. Continue Ativan 1mg BID. SW aware of insurance issues which does not allow pt to return to [facility] for inpatient care. As per [facility] note, awaiting response from other inpatient hospitals.      Plan:  - Consider Enhanced Supervision given improvements in behavior; inclusive consider 7S/Mindful Care Unit  	- LOW threshold for PRN usage for agitation due to history of aggression  	- Consider restraints if agitation escalates or aggression  - Continue Risperidone 1.5mg BID PO  - Continue Benztropine 0.25mg BID  - Continue Ativan 1mg BID PO, HOLD for sedation and monitor pulse ox closely  - Continue Depakote 750mg qHS for seizures, would defer further titration if needed as per primary/neuro team  - If patient agitated and QTc < 500, can use Zyprexa 2.5mg PO/IM q6h PRN per agitation regimen at [facility]. If open to PO c/w current dose of Seroquel 50mg PO Q 6 PRN   - Consider PT eval (deconditioned? inpatient rehab indicated?)  - Patient likely needs inpatient psychiatric admission, 2PC completed, in pt chart     - SW working on other inpatient psychiatric facility due to insurance issues  - Alternate dispo: inpatient rehab/NH if patient is near baseline and/or physically deconditioned (recruit PT for this assessment if needed)  - Cl Psych to continue to follow

## 2021-11-30 NOTE — PROGRESS NOTE ADULT - PROBLEM SELECTOR PLAN 1
Brought in by EMS for increasing lethargy i/s/o hypotension (SBP in 80s). Vitals stable, afebrile. A&Ox3 at baseline. No leukocytosis, UCx and BCx negative. CXR clear. CT Head, CT perfusion, CTA head and neck negative for acute pathology and significant stenosis. Lactate, ammonia, CK wnl. Unclear etiology. Likely medication induced    -no signs or symptoms of infection  -neuro exam normal  -at baseline mental status  -urine tox positive for benzodiazepines (Ativan PTA)  -neuro following: rec MR head without contrast  -EKG NSR without signs of ischemia  -TSH, B12, folate wnl  -orthostatic vitals normal  -passed dysphagia screen in ED, will start pureed diet  -pt reports anisocuria is chronic   -per psych rec, will resume risperidal and Ativan 1 mg PO BID  -psych rec continue to hold trazodone and gabapentin due to recent AMS and polypharmacy Transferred from [facility]. Decompensated Schizophrenia. w/ disorganized thought process, paranoia, Jain preoccupations    -psych rec continuing Seroquel 50 mg q6 PRN, Ativan 1mg PRN (for severe agitation) and Zyprexa 2.5 mg IM q6 PRN for agitation and holding Ativan if not having any effect due to deliriogenic properties that may be worsening agitation. No Haldol due to Parkinson's disease  - agitated at times, now calm and cooperative   - per psych discussion with Dr. Chung at [facility], patient has been manic, labile, tearful and hyperreligious while at [facility]  - continue depakote 750mg qhs and Cogentin 0.5mg BID  - c/w Risperdal 1.5mg BID    - c/w Ativan 1 mg PO BID per psych to avoid withdrawal  - decrease cogentin to 0.25mg BID  - psych rec continuing to hold trazodone and gabapentin due to recent AMS and to avoid polypharmacy    - pt will need inpatient psychiatric treatment for further stabilization per psych.

## 2021-11-30 NOTE — PROGRESS NOTE ADULT - ASSESSMENT
71 yo F with PMH of HTN, T2DM, asthma, seizure disorder, Parkinson's disease, glaucoma, schizophrenia, hemorrhoids BIBEMS from [facility] for acute change in mental status i/s/o hypotension. Likely medication-induced given negative brain imaging and negative metabolic and infectious work up. Waiting for acceptance by inpatient psych facility

## 2021-11-30 NOTE — BH CONSULTATION LIAISON PROGRESS NOTE - MSE UNSTRUCTURED FT
Pt seen with 1:1, AA ox 3 though disorganized, impulsive, softly paranoid. No hallucinations. No focal neuro deficits noted on exam. Insight rather limited/poor. Appearance: appears her stated age  Behavior: cooperative with the interviewer  Motor: catatonia present in her hands and lower extremities  Speech: fluent, normal tone, volume, rate  Mood: stable, slightly down  Affect: stable and constricted  Thought Process: disorganized, delusional, non-linear  Thought Content: no suicidal/homicial IIP  Perception: auditory and ?visual hallucinations present  Cognition: not assessed  Insight: poor  Judgement: poor  Impulsivity: poor

## 2021-11-30 NOTE — BH CONSULTATION LIAISON PROGRESS NOTE - NSBHCHARTREVIEWVS_PSY_A_CORE FT
Vital Signs Last 24 Hrs  T(C): 36.9 (30 Nov 2021 06:45), Max: 37 (29 Nov 2021 14:29)  T(F): 98.5 (30 Nov 2021 06:45), Max: 98.6 (29 Nov 2021 14:29)  HR: 85 (30 Nov 2021 06:45) (80 - 118)  BP: 117/81 (30 Nov 2021 06:45) (99/58 - 117/81)  BP(mean): --  RR: 17 (30 Nov 2021 06:45) (17 - 18)  SpO2: 100% (30 Nov 2021 06:45) (100% - 100%) T(C): 36.9 (30 Nov 2021 06:45), Max: 37 (29 Nov 2021 14:29)  T(F): 98.5 (30 Nov 2021 06:45), Max: 98.6 (29 Nov 2021 14:29)  HR: 85 (30 Nov 2021 06:45) (80 - 118)  BP: 117/81 (30 Nov 2021 06:45) (99/58 - 117/81)  BP(mean): --  RR: 17 (30 Nov 2021 06:45) (17 - 18)  SpO2: 100% (30 Nov 2021 06:45) (100% - 100%)

## 2021-11-30 NOTE — PROGRESS NOTE ADULT - SUBJECTIVE AND OBJECTIVE BOX
PROGRESS NOTE:   Authored by Melony Chadwick MD  Internal Medicine  Pager [phone]  Pager [phone]    Patient is a 70y old  Female who presents with a chief complaint of AMS (29 Nov 2021 15:32)      SUBJECTIVE / OVERNIGHT EVENTS: NAEO    MEDICATIONS  (STANDING):  ammonium lactate   12% Cream 1 Application(s) Topical two times a day  benztropine 0.25 milliGRAM(s) Oral two times a day  carbidopa/levodopa  25/100 1 Tablet(s) Oral three times a day  diVALproex Sprinkle 750 milliGRAM(s) Oral at bedtime  enoxaparin Injectable 40 milliGRAM(s) SubCutaneous daily  hydrocortisone 1% Cream 1 Application(s) Topical every 12 hours  influenza  Vaccine (HIGH DOSE) 0.7 milliLiter(s) IntraMuscular once  lactulose Syrup 10 Gram(s) Oral at bedtime  levothyroxine 75 MICROGram(s) Oral daily  lisinopril 10 milliGRAM(s) Oral daily  LORazepam     Tablet 1 milliGRAM(s) Oral <User Schedule>  metoprolol tartrate 25 milliGRAM(s) Oral two times a day  petrolatum white Ointment 1 Application(s) Topical two times a day  risperiDONE   Tablet 1.5 milliGRAM(s) Oral two times a day  senna 1 Tablet(s) Oral two times a day    MEDICATIONS  (PRN):  OLANZapine 2.5 milliGRAM(s) Oral once PRN severe breakthrough agitation  OLANZapine Injectable 2.5 milliGRAM(s) IntraMuscular every 6 hours PRN agitation  QUEtiapine 50 milliGRAM(s) Oral every 6 hours PRN agitation      CAPILLARY BLOOD GLUCOSE      POCT Blood Glucose.: 119 mg/dL (29 Nov 2021 12:13)    I&O's Summary    29 Nov 2021 07:01  -  30 Nov 2021 07:00  --------------------------------------------------------  IN: 120 mL / OUT: 0 mL / NET: 120 mL        PHYSICAL EXAM:  Vital Signs Last 24 Hrs  T(C): 37 (29 Nov 2021 20:34), Max: 37 (29 Nov 2021 14:29)  T(F): 98.6 (29 Nov 2021 20:34), Max: 98.6 (29 Nov 2021 14:29)  HR: 80 (29 Nov 2021 20:34) (80 - 118)  BP: 99/58 (29 Nov 2021 20:34) (99/58 - 112/65)  BP(mean): --  RR: 18 (29 Nov 2021 20:34) (18 - 18)  SpO2: 100% (29 Nov 2021 20:34) (100% - 100%)  CONSTITUTIONAL: Well-groomed, in no apparent distress  EYES: No conjunctival or scleral injection, non-icteric;   ENMT: No external nasal lesions; MMM  NECK: Trachea midline without palpable neck mass; thyroid not enlarged and non-tender  RESPIRATORY: Breathing comfortably; no dullness to percussion; lungs CTA without wheeze/rhonchi/rales  CARDIOVASCULAR: +S1S2, RRR, no M/G/R; pedal pulses full and symmetric; no lower extremity edema  GASTROINTESTINAL: No palpable masses or tenderness, +BS throughout, no rebound/guarding; no hepatosplenomegaly; no hernia palpated  LYMPHATIC: No cervical LAD or tenderness  SKIN: No rashes or ulcers noted  NEUROLOGIC: CN II-XII intact; sensation intact in LEs b/l to light touch  PSYCHIATRIC: A+O x 3; mood and affect appropriate; appropriate insight and judgment    LABS:    11-29    137  |  103  |  12  ----------------------------<  123<H>  4.6   |  17<L>  |  0.79    Ca    9.6      29 Nov 2021 09:02  Phos  4.0     11-29  Mg     2.00     11-29                  RADIOLOGY & ADDITIONAL TESTS:  Results Reviewed:   Imaging Personally Reviewed:  Electrocardiogram Personally Reviewed:    COORDINATION OF CARE:  Care Discussed with Consultants/Other Providers [Y/N]:  Prior or Outpatient Records Reviewed [Y/N]:   PROGRESS NOTE:   Authored by Melony Chadwick MD  Internal Medicine  Pager [phone]  Pager [phone]    Patient is a 70y old  Female who presents with a chief complaint of AMS (29 Nov 2021 15:32)      SUBJECTIVE / OVERNIGHT EVENTS: NAEO. Patient seen and examined at bedside. patient     MEDICATIONS  (STANDING):  ammonium lactate   12% Cream 1 Application(s) Topical two times a day  benztropine 0.25 milliGRAM(s) Oral two times a day  carbidopa/levodopa  25/100 1 Tablet(s) Oral three times a day  diVALproex Sprinkle 750 milliGRAM(s) Oral at bedtime  enoxaparin Injectable 40 milliGRAM(s) SubCutaneous daily  hydrocortisone 1% Cream 1 Application(s) Topical every 12 hours  influenza  Vaccine (HIGH DOSE) 0.7 milliLiter(s) IntraMuscular once  lactulose Syrup 10 Gram(s) Oral at bedtime  levothyroxine 75 MICROGram(s) Oral daily  lisinopril 10 milliGRAM(s) Oral daily  LORazepam     Tablet 1 milliGRAM(s) Oral <User Schedule>  metoprolol tartrate 25 milliGRAM(s) Oral two times a day  petrolatum white Ointment 1 Application(s) Topical two times a day  risperiDONE   Tablet 1.5 milliGRAM(s) Oral two times a day  senna 1 Tablet(s) Oral two times a day    MEDICATIONS  (PRN):  OLANZapine 2.5 milliGRAM(s) Oral once PRN severe breakthrough agitation  OLANZapine Injectable 2.5 milliGRAM(s) IntraMuscular every 6 hours PRN agitation  QUEtiapine 50 milliGRAM(s) Oral every 6 hours PRN agitation      CAPILLARY BLOOD GLUCOSE      POCT Blood Glucose.: 119 mg/dL (29 Nov 2021 12:13)    I&O's Summary    29 Nov 2021 07:01  -  30 Nov 2021 07:00  --------------------------------------------------------  IN: 120 mL / OUT: 0 mL / NET: 120 mL        PHYSICAL EXAM:  Vital Signs Last 24 Hrs  T(C): 37 (29 Nov 2021 20:34), Max: 37 (29 Nov 2021 14:29)  T(F): 98.6 (29 Nov 2021 20:34), Max: 98.6 (29 Nov 2021 14:29)  HR: 80 (29 Nov 2021 20:34) (80 - 118)  BP: 99/58 (29 Nov 2021 20:34) (99/58 - 112/65)  BP(mean): --  RR: 18 (29 Nov 2021 20:34) (18 - 18)  SpO2: 100% (29 Nov 2021 20:34) (100% - 100%)  CONSTITUTIONAL: Well-groomed, in no apparent distress  EYES: No conjunctival or scleral injection, non-icteric;   ENMT: No external nasal lesions; MMM  NECK: Trachea midline without palpable neck mass; thyroid not enlarged and non-tender  RESPIRATORY: Breathing comfortably; no dullness to percussion; lungs CTA without wheeze/rhonchi/rales  CARDIOVASCULAR: +S1S2, RRR, no M/G/R; pedal pulses full and symmetric; no lower extremity edema  GASTROINTESTINAL: No palpable masses or tenderness, +BS throughout, no rebound/guarding; no hepatosplenomegaly; no hernia palpated  LYMPHATIC: No cervical LAD or tenderness  SKIN: No rashes or ulcers noted  NEUROLOGIC: CN II-XII intact; sensation intact in LEs b/l to light touch  PSYCHIATRIC: A+O x 3; mood and affect appropriate; appropriate insight and judgment    LABS:    11-29    137  |  103  |  12  ----------------------------<  123<H>  4.6   |  17<L>  |  0.79    Ca    9.6      29 Nov 2021 09:02  Phos  4.0     11-29  Mg     2.00     11-29                  RADIOLOGY & ADDITIONAL TESTS:  Results Reviewed:   Imaging Personally Reviewed:  Electrocardiogram Personally Reviewed:    COORDINATION OF CARE:  Care Discussed with Consultants/Other Providers [Y/N]:  Prior or Outpatient Records Reviewed [Y/N]:   PROGRESS NOTE:   Authored by Melony Chadwick MD  Internal Medicine  Pager [facility] [phone]  Pager [phone]    Patient is a 70y old  Female who presents with a chief complaint of AMS (29 Nov 2021 15:32)      SUBJECTIVE / OVERNIGHT EVENTS: NAEO. Patient seen and examined at bedside. patient notes that she has a painful lesion on her foot. paranoid and quickly alternates from tearful to angry    MEDICATIONS  (STANDING):  ammonium lactate   12% Cream 1 Application(s) Topical two times a day  benztropine 0.25 milliGRAM(s) Oral two times a day  carbidopa/levodopa  25/100 1 Tablet(s) Oral three times a day  diVALproex Sprinkle 750 milliGRAM(s) Oral at bedtime  enoxaparin Injectable 40 milliGRAM(s) SubCutaneous daily  hydrocortisone 1% Cream 1 Application(s) Topical every 12 hours  influenza  Vaccine (HIGH DOSE) 0.7 milliLiter(s) IntraMuscular once  lactulose Syrup 10 Gram(s) Oral at bedtime  levothyroxine 75 MICROGram(s) Oral daily  lisinopril 10 milliGRAM(s) Oral daily  LORazepam     Tablet 1 milliGRAM(s) Oral <User Schedule>  metoprolol tartrate 25 milliGRAM(s) Oral two times a day  petrolatum white Ointment 1 Application(s) Topical two times a day  risperiDONE   Tablet 1.5 milliGRAM(s) Oral two times a day  senna 1 Tablet(s) Oral two times a day    MEDICATIONS  (PRN):  OLANZapine 2.5 milliGRAM(s) Oral once PRN severe breakthrough agitation  OLANZapine Injectable 2.5 milliGRAM(s) IntraMuscular every 6 hours PRN agitation  QUEtiapine 50 milliGRAM(s) Oral every 6 hours PRN agitation      CAPILLARY BLOOD GLUCOSE      POCT Blood Glucose.: 119 mg/dL (29 Nov 2021 12:13)    I&O's Summary    29 Nov 2021 07:01  -  30 Nov 2021 07:00  --------------------------------------------------------  IN: 120 mL / OUT: 0 mL / NET: 120 mL        PHYSICAL EXAM:  Vital Signs Last 24 Hrs  T(C): 37 (29 Nov 2021 20:34), Max: 37 (29 Nov 2021 14:29)  T(F): 98.6 (29 Nov 2021 20:34), Max: 98.6 (29 Nov 2021 14:29)  HR: 80 (29 Nov 2021 20:34) (80 - 118)  BP: 99/58 (29 Nov 2021 20:34) (99/58 - 112/65)  BP(mean): --  RR: 18 (29 Nov 2021 20:34) (18 - 18)  SpO2: 100% (29 Nov 2021 20:34) (100% - 100%)  CONSTITUTIONAL: Well-groomed, in no apparent distress  EYES: No conjunctival or scleral injection, non-icteric;   ENMT: No external nasal lesions; MMM  NECK: Trachea midline without palpable neck mass; thyroid not enlarged and non-tender  RESPIRATORY: Breathing comfortably; no dullness to percussion; lungs CTA without wheeze/rhonchi/rales  CARDIOVASCULAR: +S1S2, RRR, no M/G/R; pedal pulses full and symmetric; no lower extremity edema  GASTROINTESTINAL: No palpable masses or tenderness, +BS throughout, no rebound/guarding; no hepatosplenomegaly; no hernia palpated  LYMPHATIC: No cervical LAD or tenderness  SKIN: scaly approx 3 cm rash on dorsal surface of foot with two <1 cm lesions, skin broken, hyperkeratin on perimeter of rash  NEUROLOGIC: CN II-XII intact; sensation intact in LEs b/l to light touch  PSYCHIATRIC: A+O, paranoid, alternates between tearful to angry    LABS:    11-29    137  |  103  |  12  ----------------------------<  123<H>  4.6   |  17<L>  |  0.79    Ca    9.6      29 Nov 2021 09:02  Phos  4.0     11-29  Mg     2.00     11-29                  RADIOLOGY & ADDITIONAL TESTS:  Results Reviewed:   Imaging Personally Reviewed:  Electrocardiogram Personally Reviewed:    COORDINATION OF CARE:  Care Discussed with Consultants/Other Providers [Y/N]:  Prior or Outpatient Records Reviewed [Y/N]:

## 2021-12-01 LAB
GLUCOSE BLDC GLUCOMTR-MCNC: 132 MG/DL — HIGH (ref 70–99)
GLUCOSE BLDC GLUCOMTR-MCNC: 173 MG/DL — HIGH (ref 70–99)

## 2021-12-01 PROCEDURE — 99233 SBSQ HOSP IP/OBS HIGH 50: CPT

## 2021-12-01 PROCEDURE — 99231 SBSQ HOSP IP/OBS SF/LOW 25: CPT | Mod: GC

## 2021-12-01 RX ORDER — DIVALPROEX SODIUM 500 MG/1
500 TABLET, DELAYED RELEASE ORAL
Refills: 0 | Status: DISCONTINUED | OUTPATIENT
Start: 2021-12-01 | End: 2021-12-07

## 2021-12-01 RX ORDER — OLANZAPINE 15 MG/1
5 TABLET, FILM COATED ORAL THREE TIMES A DAY
Refills: 0 | Status: DISCONTINUED | OUTPATIENT
Start: 2021-12-01 | End: 2021-12-07

## 2021-12-01 RX ADMIN — OLANZAPINE 5 MILLIGRAM(S): 15 TABLET, FILM COATED ORAL at 22:09

## 2021-12-01 RX ADMIN — Medication 1 APPLICATION(S): at 18:01

## 2021-12-01 RX ADMIN — Medication 1 MILLIGRAM(S): at 10:38

## 2021-12-01 RX ADMIN — CARBIDOPA AND LEVODOPA 1 TABLET(S): 25; 100 TABLET ORAL at 14:51

## 2021-12-01 RX ADMIN — OLANZAPINE 2.5 MILLIGRAM(S): 15 TABLET, FILM COATED ORAL at 10:39

## 2021-12-01 RX ADMIN — CARBIDOPA AND LEVODOPA 1 TABLET(S): 25; 100 TABLET ORAL at 22:08

## 2021-12-01 RX ADMIN — RISPERIDONE 1.5 MILLIGRAM(S): 4 TABLET ORAL at 18:01

## 2021-12-01 RX ADMIN — SENNA PLUS 1 TABLET(S): 8.6 TABLET ORAL at 07:01

## 2021-12-01 RX ADMIN — Medication 25 MILLIGRAM(S): at 18:04

## 2021-12-01 RX ADMIN — Medication 1 APPLICATION(S): at 07:02

## 2021-12-01 RX ADMIN — LISINOPRIL 10 MILLIGRAM(S): 2.5 TABLET ORAL at 07:01

## 2021-12-01 RX ADMIN — RISPERIDONE 1.5 MILLIGRAM(S): 4 TABLET ORAL at 07:00

## 2021-12-01 RX ADMIN — Medication 25 MILLIGRAM(S): at 07:00

## 2021-12-01 RX ADMIN — QUETIAPINE FUMARATE 50 MILLIGRAM(S): 200 TABLET, FILM COATED ORAL at 12:22

## 2021-12-01 RX ADMIN — LACTULOSE 10 GRAM(S): 10 SOLUTION ORAL at 22:09

## 2021-12-01 RX ADMIN — Medication 0.25 MILLIGRAM(S): at 07:01

## 2021-12-01 RX ADMIN — Medication 1 APPLICATION(S): at 18:13

## 2021-12-01 RX ADMIN — Medication 1 MILLIGRAM(S): at 22:08

## 2021-12-01 RX ADMIN — OLANZAPINE 5 MILLIGRAM(S): 15 TABLET, FILM COATED ORAL at 18:42

## 2021-12-01 RX ADMIN — CARBIDOPA AND LEVODOPA 1 TABLET(S): 25; 100 TABLET ORAL at 07:07

## 2021-12-01 RX ADMIN — ENOXAPARIN SODIUM 40 MILLIGRAM(S): 100 INJECTION SUBCUTANEOUS at 12:23

## 2021-12-01 RX ADMIN — DIVALPROEX SODIUM 750 MILLIGRAM(S): 500 TABLET, DELAYED RELEASE ORAL at 22:08

## 2021-12-01 RX ADMIN — Medication 1 APPLICATION(S): at 07:09

## 2021-12-01 RX ADMIN — Medication 75 MICROGRAM(S): at 07:00

## 2021-12-01 RX ADMIN — Medication 0.25 MILLIGRAM(S): at 18:01

## 2021-12-01 RX ADMIN — SENNA PLUS 1 TABLET(S): 8.6 TABLET ORAL at 18:00

## 2021-12-01 NOTE — BH CONSULTATION LIAISON PROGRESS NOTE - NSBHASSESSMENTFT_PSY_ALL_CORE
Pt is a 69 y/o AAF, domiciled at [facility], long hx of Schizophrenia with multiple past inpatient hospitalizations, last hospitalization started at [facility] 10/13/21, past hx of aggression towards staff and peers, no documented hx of SA, pertinent PMH includes HLD, HTN, Parkinson's dx. Seizure d/o, T2DM, Hypothyroidism, Glaucoma, PVD, Asthma, Bladder Ca. Pt was found unconscious on the floor at [facility] on 11/20/21, BIBEMS from [facility] for acute change in mental status.     Pt is currently being medically managed by the medicine and neurology team for AMS episode. CT head, CTA head and neck and CT perfusion has been done, grossly negative. R/o infectious and metabolic causes of AMS.     Pt care was discussed with Dr. Chung at [facility] on 11/22, patient has been manic, labile, tearful and hyperreligious, consistent with history of schizophrenia, currently decompensated. Pt currently lacks insight, she is paranoid with extremely disorganized thoughts making her a harm to herself and others and requires continued inpatient hospitalization for monitoring and medication reconciliation. Pt has a history of aggression. She is unpredictable and impulsive. Although cooperative today, history of agitation and aggression warrants continued low threshold for PRN and restraints.     Current PRN Zyprexa 2.5mg q6hrs, with additional dose if pt lacks response. Rx was restarted on psychiatric medications due to concern for psychiatric decompensation.     Regarding standing medications, change Risperidone 1.5mg BID to Zyprexa 5mg TID and add Depakote 500mg in AM. Continue Ativan 1mg BID. SW aware of insurance issues which does not allow pt to return to [facility] for inpatient care. As per [facility] note, awaiting response from other inpatient hospitals.      Plan:  - Consider Enhanced Supervision given improvements in behavior; inclusive consider 7S/Mindful Care Unit  	- LOW threshold for PRN usage for agitation due to history of aggression  	- Consider restraints if agitation escalates or aggression  - Change Risperidone 1.5mg BID to Zyprexa 5mg TID  - Continue Benztropine 0.25mg BID  - Continue Ativan 1mg BID PO, HOLD for sedation and monitor pulse ox closely  - Continue Depakote 750mg qHS for seizures, but add Depakote 500mg in AM. Would defer further titration if needed as per primary/neuro team  - If patient agitated and QTc < 500, can use Zyprexa 2.5mg PO/IM q6h PRN per agitation regimen at [facility]. If open to PO c/w current dose of Seroquel 50mg PO Q 6 PRN   - Consider PT eval (deconditioned? inpatient rehab indicated?)  - Patient likely needs inpatient psychiatric admission, 2PC completed, in pt chart     - SW working on other inpatient psychiatric facility due to insurance issues  - Alternate dispo: inpatient rehab/NH if patient is near baseline and/or physically deconditioned (recruit PT for this assessment if needed)  - Cl Psych to continue to follow     Pt is a 69 y/o AAF, domiciled at [facility], long hx of Schizophrenia with multiple past inpatient hospitalizations, last hospitalization started at [facility] 10/13/21, past hx of aggression towards staff and peers, no documented hx of SA, pertinent PMH includes HLD, HTN, Parkinson's dx. Seizure d/o, T2DM, Hypothyroidism, Glaucoma, PVD, Asthma, Bladder Ca. Pt was found unconscious on the floor at [facility] on 11/20/21, BIBEMS from [facility] for acute change in mental status.     Pt is currently being medically managed by the medicine and neurology team for AMS episode. CT head, CTA head and neck and CT perfusion has been done, grossly negative. R/o infectious and metabolic causes of AMS. Pt care was discussed with Dr. Chung at [facility] on 11/22, patient has been manic, labile, tearful and hyperreligious, consistent with history of schizophrenia, currently decompensated. Pt currently lacks insight, she is paranoid with extremely disorganized thoughts making her a harm to herself and others and requires continued inpatient hospitalization for monitoring and medication reconciliation. Pt has a history of aggression. She is unpredictable and impulsive. Although cooperative today, history of agitation and aggression warrants continued low threshold for PRN and restraints. Current PRN Zyprexa 2.5mg q6hrs, with additional dose if pt lacks response. Rx was restarted on psychiatric medications due to concern for psychiatric decompensation.     Regarding standing medications, change Risperidone 1.5mg BID to Zyprexa 5mg TID and add Depakote 500mg in AM. Continue Ativan 1mg BID. SW aware of insurance issues which does not allow pt to return to [facility] for inpatient care. As per [facility] note, awaiting response from other inpatient hospitals.      Plan:  - Consider Enhanced Supervision given improvements in behavior; inclusive consider 7S/Mindful Care Unit  	- LOW threshold for PRN usage for agitation due to history of aggression  	- Consider restraints if agitation escalates or aggression  - Change Risperidone 1.5mg BID to Zyprexa 5mg TID  - Continue Benztropine 0.25mg BID  - Continue Ativan 1mg BID PO, HOLD for sedation and monitor pulse ox closely  - Continue Depakote 750mg qHS for seizures, but add Depakote 500mg in AM. Would defer further titration if needed as per primary/neuro team  - If patient agitated and QTc < 500, can use Zyprexa 2.5mg PO/IM q6h PRN per agitation regimen at [facility]. If open to PO c/w current dose of Seroquel 50mg PO Q 6 PRN   - Consider PT eval (deconditioned? inpatient rehab indicated?)  - Patient likely needs inpatient psychiatric admission, 2PC completed, in pt chart     - SW working on other inpatient psychiatric facility due to insurance issues  - Alternate dispo: inpatient rehab/NH if patient is near baseline and/or physically deconditioned (recruit PT for this assessment if needed)  - Cl Psych to continue to follow

## 2021-12-01 NOTE — BH CONSULTATION LIAISON PROGRESS NOTE - CASE SUMMARY
Chart reviewed. Pt seen with trainee. She remains disorganized/loose and paranoid. Denies SI/HI but has very limited insight into her needs and vaguely alludes to problems with her family. She wants to be left alone. + EPS/TD but not severe or limiting. Some deconditioning suggested by walking exam. She warrants either continued transfer to inpatient psych or NH with rehab if this is her baseline and doesn't improve much more. Also strongly suggest 7S/Mindful Care Unit; discussed with RN manager. Will continue to follow.  Chart reviewed. Pt seen with trainee. She remains disorganized/loose and paranoid. Rather agitated this morning, trying to leave room, needing PRNs, etc. Exam rather limited. Exam limited. Strongly suggest 7S/Mindful Care Unit; discussed with 7S RN manager. Eventual dispo to be determined; may not need inpatient psych ultimately if can be stabilized on 7S. Will continue to follow.

## 2021-12-01 NOTE — BH CONSULTATION LIAISON PROGRESS NOTE - MSE UNSTRUCTURED FT
Appearance: appears her stated age  Behavior: superficially cooperative with the interviewer  Motor: catatonia present in her hands and lower extremities  Speech: fluent, normal tone/rate, hyperphonetic  Mood: agitate  Affect: congruent to mood, but constricted  Thought Process: disorganized, delusional, non-linear, loose association  Thought Content: no suicidal/homicial IIP  Perception: no hallucinations  Cognition: not assessed  Insight: poor  Judgement: poor  Impulsivity: poor

## 2021-12-01 NOTE — PROGRESS NOTE ADULT - SUBJECTIVE AND OBJECTIVE BOX
PROGRESS NOTE:   Authored by Melony Chadwick MD  Internal Medicine  Pager [phone]  Pager [phone]    Patient is a 70y old  Female who presents with a chief complaint of AMS (29 Nov 2021 15:32)      SUBJECTIVE / OVERNIGHT EVENTS: NAEO    MEDICATIONS  (STANDING):  ammonium lactate   12% Cream 1 Application(s) Topical two times a day  benztropine 0.25 milliGRAM(s) Oral two times a day  carbidopa/levodopa  25/100 1 Tablet(s) Oral three times a day  clotrimazole 1% Cream 1 Application(s) Topical two times a day  diVALproex Sprinkle 750 milliGRAM(s) Oral at bedtime  enoxaparin Injectable 40 milliGRAM(s) SubCutaneous daily  hydrocortisone 1% Cream 1 Application(s) Topical every 12 hours  influenza  Vaccine (HIGH DOSE) 0.7 milliLiter(s) IntraMuscular once  lactulose Syrup 10 Gram(s) Oral at bedtime  levothyroxine 75 MICROGram(s) Oral daily  lisinopril 10 milliGRAM(s) Oral daily  LORazepam     Tablet 1 milliGRAM(s) Oral <User Schedule>  metoprolol tartrate 25 milliGRAM(s) Oral two times a day  petrolatum white Ointment 1 Application(s) Topical two times a day  risperiDONE   Tablet 1.5 milliGRAM(s) Oral two times a day  senna 1 Tablet(s) Oral two times a day    MEDICATIONS  (PRN):  OLANZapine 2.5 milliGRAM(s) Oral once PRN severe breakthrough agitation  OLANZapine Injectable 2.5 milliGRAM(s) IntraMuscular every 6 hours PRN agitation  QUEtiapine 50 milliGRAM(s) Oral every 6 hours PRN agitation      CAPILLARY BLOOD GLUCOSE      POCT Blood Glucose.: 93 mg/dL (30 Nov 2021 09:30)    I&O's Summary      PHYSICAL EXAM:  Vital Signs Last 24 Hrs  T(C): 36.9 (30 Nov 2021 14:48), Max: 36.9 (30 Nov 2021 14:48)  T(F): 98.5 (30 Nov 2021 14:48), Max: 98.5 (30 Nov 2021 14:48)  HR: 112 (30 Nov 2021 18:26) (100 - 112)  BP: 121/89 (30 Nov 2021 18:26) (121/89 - 125/69)  BP(mean): --  RR: 18 (30 Nov 2021 14:48) (18 - 18)  SpO2: 100% (30 Nov 2021 14:48) (100% - 100%)  CONSTITUTIONAL: Well-groomed, in no apparent distress  EYES: No conjunctival or scleral injection, non-icteric;   ENMT: No external nasal lesions; MMM  NECK: Trachea midline without palpable neck mass; thyroid not enlarged and non-tender  RESPIRATORY: Breathing comfortably; no dullness to percussion; lungs CTA without wheeze/rhonchi/rales  CARDIOVASCULAR: +S1S2, RRR, no M/G/R; pedal pulses full and symmetric; no lower extremity edema  GASTROINTESTINAL: No palpable masses or tenderness, +BS throughout, no rebound/guarding; no hepatosplenomegaly; no hernia palpated  LYMPHATIC: No cervical LAD or tenderness  SKIN: No rashes or ulcers noted  NEUROLOGIC: CN II-XII intact; sensation intact in LEs b/l to light touch  PSYCHIATRIC: A+O x 3; mood and affect appropriate; appropriate insight and judgment    LABS:    11-30    138  |  103  |  21  ----------------------------<  133<H>  4.6   |  26  |  1.02    Ca    9.8      30 Nov 2021 08:03  Phos  4.2     11-30  Mg     2.00     11-30                  RADIOLOGY & ADDITIONAL TESTS:  Results Reviewed:   Imaging Personally Reviewed:  Electrocardiogram Personally Reviewed:    COORDINATION OF CARE:  Care Discussed with Consultants/Other Providers [Y/N]:  Prior or Outpatient Records Reviewed [Y/N]:   PROGRESS NOTE:   Authored by Melony Chadwick MD  Internal Medicine  Pager [phone] [phone]  Pager [phone]    Patient is a 70y old  Female who presents with a chief complaint of AMS (29 Nov 2021 15:32)      SUBJECTIVE / OVERNIGHT EVENTS: NAEO. patient seen and examined at bedside, without acute complaints.    MEDICATIONS  (STANDING):  ammonium lactate   12% Cream 1 Application(s) Topical two times a day  benztropine 0.25 milliGRAM(s) Oral two times a day  carbidopa/levodopa  25/100 1 Tablet(s) Oral three times a day  clotrimazole 1% Cream 1 Application(s) Topical two times a day  diVALproex Sprinkle 750 milliGRAM(s) Oral at bedtime  enoxaparin Injectable 40 milliGRAM(s) SubCutaneous daily  hydrocortisone 1% Cream 1 Application(s) Topical every 12 hours  influenza  Vaccine (HIGH DOSE) 0.7 milliLiter(s) IntraMuscular once  lactulose Syrup 10 Gram(s) Oral at bedtime  levothyroxine 75 MICROGram(s) Oral daily  lisinopril 10 milliGRAM(s) Oral daily  LORazepam     Tablet 1 milliGRAM(s) Oral <User Schedule>  metoprolol tartrate 25 milliGRAM(s) Oral two times a day  petrolatum white Ointment 1 Application(s) Topical two times a day  risperiDONE   Tablet 1.5 milliGRAM(s) Oral two times a day  senna 1 Tablet(s) Oral two times a day    MEDICATIONS  (PRN):  OLANZapine 2.5 milliGRAM(s) Oral once PRN severe breakthrough agitation  OLANZapine Injectable 2.5 milliGRAM(s) IntraMuscular every 6 hours PRN agitation  QUEtiapine 50 milliGRAM(s) Oral every 6 hours PRN agitation      CAPILLARY BLOOD GLUCOSE      POCT Blood Glucose.: 93 mg/dL (30 Nov 2021 09:30)    I&O's Summary      PHYSICAL EXAM:  Vital Signs Last 24 Hrs  T(C): 36.9 (30 Nov 2021 14:48), Max: 36.9 (30 Nov 2021 14:48)  T(F): 98.5 (30 Nov 2021 14:48), Max: 98.5 (30 Nov 2021 14:48)  HR: 112 (30 Nov 2021 18:26) (100 - 112)  BP: 121/89 (30 Nov 2021 18:26) (121/89 - 125/69)  BP(mean): --  RR: 18 (30 Nov 2021 14:48) (18 - 18)  SpO2: 100% (30 Nov 2021 14:48) (100% - 100%)  CONSTITUTIONAL: cachetic, eating breakfast with finger  EYES: No conjunctival or scleral injection, non-icteric;   RESPIRATORY: Breathing comfortably; no dullness to percussion; lungs CTA without wheeze/rhonchi/rales  CARDIOVASCULAR: +S1S2, RRR, no M/G/R; pedal pulses full and symmetric; no lower extremity edema  GASTROINTESTINAL: No palpable masses or tenderness, +BS throughout, no rebound/guarding; no hepatosplenomegaly; no hernia palpated  LYMPHATIC: No cervical LAD or tenderness  SKIN: No rashes or ulcers noted  NEUROLOGIC: moves all extremities spontaneously  PSYCHIATRIC: A+O ; disorganized thoughts, paranoid    LABS:    11-30    138  |  103  |  21  ----------------------------<  133<H>  4.6   |  26  |  1.02    Ca    9.8      30 Nov 2021 08:03  Phos  4.2     11-30  Mg     2.00     11-30                  RADIOLOGY & ADDITIONAL TESTS:  Results Reviewed:   Imaging Personally Reviewed:  Electrocardiogram Personally Reviewed:    COORDINATION OF CARE:  Care Discussed with Consultants/Other Providers [Y/N]:  Prior or Outpatient Records Reviewed [Y/N]:

## 2021-12-01 NOTE — PROGRESS NOTE ADULT - ASSESSMENT
71 yo F with PMH of HTN, T2DM, asthma, seizure disorder, Parkinson's disease, glaucoma, schizophrenia, hemorrhoids BIBEMS from [facility] for acute change in mental status i/s/o hypotension. Likely medication-induced given negative brain imaging and negative metabolic and infectious work up. Waiting for acceptance by inpatient psych facility

## 2021-12-01 NOTE — BH CONSULTATION LIAISON PROGRESS NOTE - NSBHCHARTREVIEWVS_PSY_A_CORE FT
Vital Signs Last 24 Hrs  T(C): 36.9 (01 Dec 2021 06:00), Max: 36.9 (30 Nov 2021 14:48)  T(F): 98.5 (01 Dec 2021 06:00), Max: 98.5 (30 Nov 2021 14:48)  HR: 95 (01 Dec 2021 06:00) (95 - 112)  BP: 124/77 (01 Dec 2021 06:00) (121/89 - 125/69)  BP(mean): --  RR: 17 (01 Dec 2021 06:00) (17 - 18)  SpO2: 100% (01 Dec 2021 06:00) (100% - 100%)

## 2021-12-01 NOTE — BH CONSULTATION LIAISON PROGRESS NOTE - NSBHFUPINTERVALHXFT_PSY_A_CORE
Chart reviewed. Pt was seen with her PCA. Pt is AAO x1 and her thought process is continued to be disorganized with delusional beliefs. Very loosely associated thought process. Pt more agitated today, trying to wander outside the room and becoming more vocal against the PCA and nurses. Poor insight, judgement, and impulse control.

## 2021-12-01 NOTE — PROGRESS NOTE ADULT - PROBLEM SELECTOR PLAN 2
scaly approx 3 cm rash on dorsal surface of foot with two <1 cm lesions, skin broken, hyperkeratin on perimeter of rash    - ammonium lactate cream not helping  - trial clotrimazole cream  - consider derm consult

## 2021-12-01 NOTE — PROGRESS NOTE ADULT - ATTENDING COMMENTS
70W PMH of HTN, T2DM, asthma, Parkinson's disease, seizure disorder, schizophrenia BIBEMS from [facility] for acute change in mental status and episode of hypotension. Stroke code was called in the ED for anisocoria, however this was discovered to be chronic finding. Hypotension and lethargy improved in the ED and admitted to medicine to rule out secondary causes. Now needs to be discharged to psych facility    # Schizophrenia - remains decompensated and will need inpatient psych facility on discharge.     Dispo: unclear. SW involved.     Plan discussed with HS1

## 2021-12-01 NOTE — PROGRESS NOTE ADULT - PROBLEM SELECTOR PLAN 1
Transferred from [facility]. Decompensated Schizophrenia. w/ disorganized thought process, paranoia, Adventist preoccupations    -psych rec continuing Seroquel 50 mg q6 PRN, Ativan 1mg PRN (for severe agitation) and Zyprexa 2.5 mg IM q6 PRN for agitation and holding Ativan if not having any effect due to deliriogenic properties that may be worsening agitation. No Haldol due to Parkinson's disease  - agitated at times, now calm and cooperative   - per psych discussion with Dr. Chung at [facility], patient has been manic, labile, tearful and hyperreligious while at [facility]  - continue depakote 750mg qhs and Cogentin 0.5mg BID  - c/w Risperdal 1.5mg BID    - c/w Ativan 1 mg PO BID per psych to avoid withdrawal  - decrease cogentin to 0.25mg BID  - psych rec continuing to hold trazodone and gabapentin due to recent AMS and to avoid polypharmacy    - pt will need inpatient psychiatric treatment for further stabilization per psych.

## 2021-12-02 LAB
GLUCOSE BLDC GLUCOMTR-MCNC: 102 MG/DL — HIGH (ref 70–99)
GLUCOSE BLDC GLUCOMTR-MCNC: 105 MG/DL — HIGH (ref 70–99)
GLUCOSE BLDC GLUCOMTR-MCNC: 131 MG/DL — HIGH (ref 70–99)
SARS-COV-2 RNA SPEC QL NAA+PROBE: SIGNIFICANT CHANGE UP

## 2021-12-02 PROCEDURE — 99233 SBSQ HOSP IP/OBS HIGH 50: CPT

## 2021-12-02 PROCEDURE — 99231 SBSQ HOSP IP/OBS SF/LOW 25: CPT | Mod: GC

## 2021-12-02 RX ADMIN — Medication 25 MILLIGRAM(S): at 05:45

## 2021-12-02 RX ADMIN — DIVALPROEX SODIUM 750 MILLIGRAM(S): 500 TABLET, DELAYED RELEASE ORAL at 21:08

## 2021-12-02 RX ADMIN — Medication 0.25 MILLIGRAM(S): at 05:45

## 2021-12-02 RX ADMIN — Medication 1 APPLICATION(S): at 05:46

## 2021-12-02 RX ADMIN — DIVALPROEX SODIUM 500 MILLIGRAM(S): 500 TABLET, DELAYED RELEASE ORAL at 05:44

## 2021-12-02 RX ADMIN — OLANZAPINE 5 MILLIGRAM(S): 15 TABLET, FILM COATED ORAL at 21:07

## 2021-12-02 RX ADMIN — Medication 1 APPLICATION(S): at 16:21

## 2021-12-02 RX ADMIN — CARBIDOPA AND LEVODOPA 1 TABLET(S): 25; 100 TABLET ORAL at 05:45

## 2021-12-02 RX ADMIN — OLANZAPINE 2.5 MILLIGRAM(S): 15 TABLET, FILM COATED ORAL at 13:54

## 2021-12-02 RX ADMIN — CARBIDOPA AND LEVODOPA 1 TABLET(S): 25; 100 TABLET ORAL at 13:56

## 2021-12-02 RX ADMIN — Medication 1 MILLIGRAM(S): at 12:54

## 2021-12-02 RX ADMIN — OLANZAPINE 5 MILLIGRAM(S): 15 TABLET, FILM COATED ORAL at 05:44

## 2021-12-02 RX ADMIN — Medication 0.25 MILLIGRAM(S): at 16:23

## 2021-12-02 RX ADMIN — Medication 25 MILLIGRAM(S): at 16:22

## 2021-12-02 RX ADMIN — Medication 1 APPLICATION(S): at 16:20

## 2021-12-02 RX ADMIN — LISINOPRIL 10 MILLIGRAM(S): 2.5 TABLET ORAL at 05:44

## 2021-12-02 RX ADMIN — Medication 1 MILLIGRAM(S): at 21:07

## 2021-12-02 RX ADMIN — Medication 75 MICROGRAM(S): at 05:44

## 2021-12-02 RX ADMIN — CARBIDOPA AND LEVODOPA 1 TABLET(S): 25; 100 TABLET ORAL at 21:07

## 2021-12-02 RX ADMIN — SENNA PLUS 1 TABLET(S): 8.6 TABLET ORAL at 05:44

## 2021-12-02 RX ADMIN — OLANZAPINE 5 MILLIGRAM(S): 15 TABLET, FILM COATED ORAL at 13:56

## 2021-12-02 RX ADMIN — LACTULOSE 10 GRAM(S): 10 SOLUTION ORAL at 21:08

## 2021-12-02 RX ADMIN — ENOXAPARIN SODIUM 40 MILLIGRAM(S): 100 INJECTION SUBCUTANEOUS at 13:54

## 2021-12-02 RX ADMIN — Medication 1 APPLICATION(S): at 05:45

## 2021-12-02 RX ADMIN — SENNA PLUS 1 TABLET(S): 8.6 TABLET ORAL at 16:22

## 2021-12-02 NOTE — PROGRESS NOTE ADULT - ASSESSMENT
69 yo F with PMH of HTN, T2DM, asthma, seizure disorder, Parkinson's disease, glaucoma, schizophrenia, hemorrhoids BIBEMS from [facility] for acute change in mental status i/s/o hypotension. Likely medication-induced given negative brain imaging and negative metabolic and infectious work up. Waiting for acceptance by inpatient psych facility

## 2021-12-02 NOTE — BH CONSULTATION LIAISON PROGRESS NOTE - NSBHCHARTREVIEWVS_PSY_A_CORE FT
Vital Signs Last 24 Hrs  T(C): 36.5 (02 Dec 2021 05:30), Max: 36.5 (02 Dec 2021 05:30)  T(F): 97.7 (02 Dec 2021 05:30), Max: 97.7 (02 Dec 2021 05:30)  HR: 75 (02 Dec 2021 05:30) (75 - 80)  BP: 120/71 (02 Dec 2021 05:30) (120/71 - 135/80)  BP(mean): --  RR: 18 (02 Dec 2021 05:30) (18 - 18)  SpO2: 100% (02 Dec 2021 05:30) (100% - 100%)

## 2021-12-02 NOTE — BH CONSULTATION LIAISON PROGRESS NOTE - NSBHFUPINTERVALHXFT_PSY_A_CORE
Chart reviewed, including transportation of the pt. Pt was seen laying in bed restlessly moving her upper extremities. She was preoccupied with internal dialogue and seemed to be in psychosis. AAO x0 and interview was not possible. Pt is able to follow commands, but attention is very limited. Pt doesn't seem agitated or irritable. Poor insight, judgement, and impulse control. Chart reviewed, including transportation of the pt. Pt was seen laying in bed restlessly moving her upper extremities. She was preoccupied with internal dialogue and seemed to be in psychosis. AAO x 1 and interview was not fruitful. Pt is able to follow simple commands, but attention is very limited. Pt doesn't seem agitated or irritable. Poor insight, judgement, and impulse control.

## 2021-12-02 NOTE — BH CONSULTATION LIAISON PROGRESS NOTE - NSBHASSESSMENTFT_PSY_ALL_CORE
Pt is a 69 y/o AAF, domiciled at [facility], long hx of Schizophrenia with multiple past inpatient hospitalizations, last hospitalization started at [facility] 10/13/21, past hx of aggression towards staff and peers, no documented hx of SA, pertinent PMH includes HLD, HTN, Parkinson's dx. Seizure d/o, T2DM, Hypothyroidism, Glaucoma, PVD, Asthma, Bladder Ca. Pt was found unconscious on the floor at [facility] on 11/20/21, BIBEMS from [facility] for acute change in mental status.     Pt is currently being medically managed by the medicine and neurology team for AMS episode. CT head, CTA head and neck and CT perfusion has been done, grossly negative. R/o infectious and metabolic causes of AMS. Pt care was discussed with Dr. Chung at [facility] on 11/22, patient has been manic, labile, tearful and hyperreligious, consistent with history of schizophrenia, currently decompensated. Pt currently lacks insight, she is paranoid with extremely disorganized thoughts making her a harm to herself and others and requires continued inpatient hospitalization for monitoring and medication reconciliation. Pt has a history of aggression. She is unpredictable and impulsive. Although cooperative today, history of agitation and aggression warrants continued low threshold for PRN and restraints. Current PRN Zyprexa 2.5mg q6hrs, with additional dose if pt lacks response. Rx was restarted on psychiatric medications due to concern for psychiatric decompensation.     Continue Zyprexa 5mg TID and Depakote 500mg in AM. Continue Ativan 1mg BID. SW aware of insurance issues which does not allow pt to return to [facility] for inpatient care. As per [facility] note, awaiting response from other inpatient hospitals.      Plan:  - Consider Enhanced Supervision given improvements in behavior; inclusive consider 7S/Mindful Care Unit  	- LOW threshold for PRN usage for agitation due to history of aggression  	- Consider restraints if agitation escalates or aggression  - Continue Zyprexa 5mg TID  - Continue Benztropine 0.25mg BID  - Continue Depakote 750mg qHS for seizures and Depakote 500mg in AM. Would defer further titration if needed as per primary/neuro team  - If patient agitated and QTc < 500, can use Zyprexa 2.5mg PO/IM q6h PRN per agitation regimen at [facility]. If open to PO c/w current dose of Seroquel 50mg PO Q 6 PRN   - Consider PT eval (deconditioned? inpatient rehab indicated?)  - Patient likely needs inpatient psychiatric admission, 2PC completed, in pt chart     - SW working on other inpatient psychiatric facility due to insurance issues  - Alternate dispo: inpatient rehab/NH if patient is near baseline and/or physically deconditioned (recruit PT for this assessment if needed)  - Cl Psych to continue to follow   Pt is a 71 y/o AAF, domiciled at [facility], long hx of Schizophrenia with multiple past inpatient hospitalizations, last hospitalization started at [facility] 10/13/21, past hx of aggression towards staff and peers, no documented hx of SA, pertinent PMH includes HLD, HTN, Parkinson's dx. Seizure d/o, T2DM, Hypothyroidism, Glaucoma, PVD, Asthma, Bladder Ca. Pt was found unconscious on the floor at [facility] on 11/20/21, BIBEMS from [facility] for acute change in mental status.     Pt is currently being medically managed by the medicine and neurology team for AMS episode. CT head, CTA head and neck and CT perfusion has been done, grossly negative. R/o infectious and metabolic causes of AMS. Pt care was discussed with Dr. Chung at [facility] on 11/22, patient has been manic, labile, tearful and hyperreligious, consistent with history of schizophrenia, currently decompensated. Pt currently lacks insight, she is paranoid with extremely disorganized thoughts making her a harm to herself and others and requires continued inpatient hospitalization for monitoring and medication reconciliation. Pt has a history of aggression. She is unpredictable and impulsive. Although cooperative today, history of agitation and aggression warrants continued low threshold for PRN and restraints. Current PRN Zyprexa 2.5mg q6hrs, with additional dose if pt lacks response. Rx was restarted on psychiatric medications due to concern for psychiatric decompensation.     Plan:  Continue Zyprexa 5mg TID and Depakote 500mg in AM.   Continue Ativan 1mg BID.   SW aware of insurance issues which does not allow pt to return to [facility] for inpatient care vs stabilization and return to NH        Plan:  - Consider Enhanced Supervision given improvements in behavior; inclusive consider 7S/Mindful Care Unit  	- LOW threshold for PRN usage for agitation due to history of aggression  	- Consider restraints if agitation escalates or aggression  - Continue Zyprexa 5mg TID  - Continue Benztropine 0.25mg BID  - Continue Depakote 750mg qHS for seizures and Depakote 500mg in AM. Would defer further titration if needed as per primary/neuro team  - If patient agitated and QTc < 500, can use Zyprexa 2.5mg PO/IM q6h PRN per agitation regimen at [facility]. If open to PO c/w current dose of Seroquel 50mg PO Q 6 PRN   - Consider PT eval (deconditioned? inpatient rehab indicated?)  - Patient likely needs inpatient psychiatric admission, 2PC completed, in pt chart     - SW working on other inpatient psychiatric facility due to insurance issues  - Alternate dispo: inpatient rehab/NH if patient is near baseline and/or physically deconditioned (recruit PT for this assessment if needed)  - Cl Psych to continue to follow

## 2021-12-02 NOTE — BH CONSULTATION LIAISON PROGRESS NOTE - CASE SUMMARY
Chart reviewed. Pt seen with trainee. She remains disorganized/loose and paranoid. Rather agitated this morning, trying to leave room, needing PRNs, etc. Exam rather limited. Exam limited. Strongly suggest 7S/Mindful Care Unit; discussed with 7S RN manager. Eventual dispo to be determined; may not need inpatient psych ultimately if can be stabilized on 7S. Will continue to follow.  Chart reviewed. Pt seen with trainee. Pt disoriented, impulsive, stimulus-bound. No focal neuro deficits or catatonia noted. Unclear if delirium 2/2 medications (recent Zyprexa administration). Denies acute safety concerns/paranoia but also rather disoriented so exam is limited. Transferred to s and dispo TBD.

## 2021-12-02 NOTE — PROGRESS NOTE ADULT - ATTENDING COMMENTS
70W PMH of HTN, T2DM, asthma, Parkinson's disease, seizure disorder, schizophrenia BIBEMS from [facility] for acute change in mental status and episode of hypotension. Stroke code was called in the ED for anisocoria, however this was discovered to be chronic finding. Hypotension and lethargy improved in the ED and admitted to medicine to rule out secondary causes. Now needs to be discharged to psych facility    # Schizophrenia - remains decompensated and will need inpatient psych facility on discharge.     Dispo: unclear. SW involved.     Plan discussed with HS1

## 2021-12-02 NOTE — PROGRESS NOTE ADULT - SUBJECTIVE AND OBJECTIVE BOX
PROGRESS NOTE:   Authored by Melony Chadwick MD  Internal Medicine  Pager LITARAH [phone]  Pager [phone]    Patient is a 70y old  Female who presents with a chief complaint of AMS (29 Nov 2021 15:32)      SUBJECTIVE / OVERNIGHT EVENTS: NAEO. patient transferred to 7s unit.     MEDICATIONS  (STANDING):  ammonium lactate   12% Cream 1 Application(s) Topical two times a day  benztropine 0.25 milliGRAM(s) Oral two times a day  carbidopa/levodopa  25/100 1 Tablet(s) Oral three times a day  clotrimazole 1% Cream 1 Application(s) Topical two times a day  diVALproex  milliGRAM(s) Oral <User Schedule>  diVALproex Sprinkle 750 milliGRAM(s) Oral at bedtime  enoxaparin Injectable 40 milliGRAM(s) SubCutaneous daily  hydrocortisone 1% Cream 1 Application(s) Topical every 12 hours  influenza  Vaccine (HIGH DOSE) 0.7 milliLiter(s) IntraMuscular once  lactulose Syrup 10 Gram(s) Oral at bedtime  levothyroxine 75 MICROGram(s) Oral daily  lisinopril 10 milliGRAM(s) Oral daily  LORazepam     Tablet 1 milliGRAM(s) Oral <User Schedule>  metoprolol tartrate 25 milliGRAM(s) Oral two times a day  OLANZapine 5 milliGRAM(s) Oral three times a day  petrolatum white Ointment 1 Application(s) Topical two times a day  senna 1 Tablet(s) Oral two times a day    MEDICATIONS  (PRN):  OLANZapine 2.5 milliGRAM(s) Oral once PRN severe breakthrough agitation  OLANZapine Injectable 2.5 milliGRAM(s) IntraMuscular every 6 hours PRN agitation  QUEtiapine 50 milliGRAM(s) Oral every 6 hours PRN agitation      CAPILLARY BLOOD GLUCOSE      POCT Blood Glucose.: 173 mg/dL (01 Dec 2021 21:02)  POCT Blood Glucose.: 132 mg/dL (01 Dec 2021 09:23)    I&O's Summary    30 Nov 2021 07:01  -  01 Dec 2021 07:00  --------------------------------------------------------  IN: 480 mL / OUT: 0 mL / NET: 480 mL        PHYSICAL EXAM:  Vital Signs Last 24 Hrs  T(C): 36.4 (01 Dec 2021 22:17), Max: 36.9 (01 Dec 2021 06:00)  T(F): 97.6 (01 Dec 2021 22:17), Max: 98.5 (01 Dec 2021 06:00)  HR: 80 (01 Dec 2021 22:17) (80 - 95)  BP: 135/80 (01 Dec 2021 22:17) (103/81 - 135/80)  BP(mean): --  RR: 18 (01 Dec 2021 22:17) (17 - 18)  SpO2: 100% (01 Dec 2021 22:17) (99% - 100%)  CONSTITUTIONAL: Well-groomed, in no apparent distress  EYES: No conjunctival or scleral injection, non-icteric;   ENMT: No external nasal lesions; MMM  NECK: Trachea midline without palpable neck mass; thyroid not enlarged and non-tender  RESPIRATORY: Breathing comfortably; no dullness to percussion; lungs CTA without wheeze/rhonchi/rales  CARDIOVASCULAR: +S1S2, RRR, no M/G/R; pedal pulses full and symmetric; no lower extremity edema  GASTROINTESTINAL: No palpable masses or tenderness, +BS throughout, no rebound/guarding; no hepatosplenomegaly; no hernia palpated  LYMPHATIC: No cervical LAD or tenderness  SKIN: No rashes or ulcers noted  NEUROLOGIC: CN II-XII intact; sensation intact in LEs b/l to light touch  PSYCHIATRIC: A+O x 3; mood and affect appropriate; appropriate insight and judgment    LABS:    11-30    138  |  103  |  21  ----------------------------<  133<H>  4.6   |  26  |  1.02    Ca    9.8      30 Nov 2021 08:03  Phos  4.2     11-30  Mg     2.00     11-30                  RADIOLOGY & ADDITIONAL TESTS:  Results Reviewed:   Imaging Personally Reviewed:  Electrocardiogram Personally Reviewed:    COORDINATION OF CARE:  Care Discussed with Consultants/Other Providers [Y/N]:  Prior or Outpatient Records Reviewed [Y/N]:   PROGRESS NOTE:   Authored by Melony Chadwick MD  Internal Medicine  Pager LITARAH [phone]  Pager -[phone]    Patient is a 70y old  Female who presents with a chief complaint of AMS (29 Nov 2021 15:32)      SUBJECTIVE / OVERNIGHT EVENTS: NAEO. patient transferred to 7s unit. Patient reports that her rash is improved. otherwise medically stable. schizophrenia continues to be decompensated. patient talking to herself and is paranoid.    MEDICATIONS  (STANDING):  ammonium lactate   12% Cream 1 Application(s) Topical two times a day  benztropine 0.25 milliGRAM(s) Oral two times a day  carbidopa/levodopa  25/100 1 Tablet(s) Oral three times a day  clotrimazole 1% Cream 1 Application(s) Topical two times a day  diVALproex  milliGRAM(s) Oral <User Schedule>  diVALproex Sprinkle 750 milliGRAM(s) Oral at bedtime  enoxaparin Injectable 40 milliGRAM(s) SubCutaneous daily  hydrocortisone 1% Cream 1 Application(s) Topical every 12 hours  influenza  Vaccine (HIGH DOSE) 0.7 milliLiter(s) IntraMuscular once  lactulose Syrup 10 Gram(s) Oral at bedtime  levothyroxine 75 MICROGram(s) Oral daily  lisinopril 10 milliGRAM(s) Oral daily  LORazepam     Tablet 1 milliGRAM(s) Oral <User Schedule>  metoprolol tartrate 25 milliGRAM(s) Oral two times a day  OLANZapine 5 milliGRAM(s) Oral three times a day  petrolatum white Ointment 1 Application(s) Topical two times a day  senna 1 Tablet(s) Oral two times a day    MEDICATIONS  (PRN):  OLANZapine 2.5 milliGRAM(s) Oral once PRN severe breakthrough agitation  OLANZapine Injectable 2.5 milliGRAM(s) IntraMuscular every 6 hours PRN agitation  QUEtiapine 50 milliGRAM(s) Oral every 6 hours PRN agitation      CAPILLARY BLOOD GLUCOSE      POCT Blood Glucose.: 173 mg/dL (01 Dec 2021 21:02)  POCT Blood Glucose.: 132 mg/dL (01 Dec 2021 09:23)    I&O's Summary    30 Nov 2021 07:01  -  01 Dec 2021 07:00  --------------------------------------------------------  IN: 480 mL / OUT: 0 mL / NET: 480 mL        PHYSICAL EXAM:  Vital Signs Last 24 Hrs  T(C): 36.4 (01 Dec 2021 22:17), Max: 36.9 (01 Dec 2021 06:00)  T(F): 97.6 (01 Dec 2021 22:17), Max: 98.5 (01 Dec 2021 06:00)  HR: 80 (01 Dec 2021 22:17) (80 - 95)  BP: 135/80 (01 Dec 2021 22:17) (103/81 - 135/80)  BP(mean): --  RR: 18 (01 Dec 2021 22:17) (17 - 18)  SpO2: 100% (01 Dec 2021 22:17) (99% - 100%)  CONSTITUTIONAL: comfortable, eating breakfast. in no apparent distress  EYES: No conjunctival or scleral injection, non-icteric;   ENMT: No external nasal lesions; MMM  NECK: Trachea midline without palpable neck mass; thyroid not enlarged and non-tender  RESPIRATORY: Breathing comfortably; no accessory mm use  CARDIOVASCULAR: pedal pulses full and symmetric; no lower extremity edema  GASTROINTESTINAL: No palpable masses or tenderness, +BS throughout, no rebound/guarding; no hepatosplenomegaly; no hernia palpated  LYMPHATIC: No cervical LAD or tenderness  SKIN: left foot with healing rash, hyperpigmented rash, open lesion healing  NEUROLOGIC: sensation intact in LEs b/l to light touch  PSYCHIATRIC: disorganized, scattered train of thought, paranoid.    LABS:    11-30    138  |  103  |  21  ----------------------------<  133<H>  4.6   |  26  |  1.02    Ca    9.8      30 Nov 2021 08:03  Phos  4.2     11-30  Mg     2.00     11-30                  RADIOLOGY & ADDITIONAL TESTS:  Results Reviewed:   Imaging Personally Reviewed:  Electrocardiogram Personally Reviewed:    COORDINATION OF CARE:  Care Discussed with Consultants/Other Providers [Y/N]:  Prior or Outpatient Records Reviewed [Y/N]:

## 2021-12-02 NOTE — PROGRESS NOTE ADULT - PROBLEM SELECTOR PLAN 1
Transferred from [facility]. Decompensated Schizophrenia. w/ disorganized thought process, paranoia, Hindu preoccupations    -psych rec continuing Seroquel 50 mg q6 PRN, Ativan 1mg PRN (for severe agitation) and Zyprexa 2.5 mg IM q6 PRN for agitation and holding Ativan if not having any effect due to deliriogenic properties that may be worsening agitation. No Haldol due to Parkinson's disease  - agitated at times, now calm and cooperative   - per psych discussion with Dr. Chung at [facility], patient has been manic, labile, tearful and hyperreligious while at [facility]  - continue depakote 750mg qhs and Cogentin 0.5mg BID  - c/w Risperdal 1.5mg BID    - c/w Ativan 1 mg PO BID per psych to avoid withdrawal  - decrease cogentin to 0.25mg BID  - psych rec continuing to hold trazodone and gabapentin due to recent AMS and to avoid polypharmacy    - pt will need inpatient psychiatric treatment for further stabilization per psych.

## 2021-12-02 NOTE — BH CONSULTATION LIAISON PROGRESS NOTE - MSE UNSTRUCTURED FT
Appearance: appears her stated age  Behavior: constantly picking on blanket, seemed restless   Motor: catatonia present in her hands and lower extremities  Speech: unable to assess  Mood: unable to assess  Affect: unable to assess  Thought Process: disorganized, delusional, non-linear, loose association  Thought Content: no suicidal/homicial IIP  Perception: unable to assess  Cognition: not assessed  Insight: poor  Judgement: poor  Impulsivity: poor

## 2021-12-03 DIAGNOSIS — K64.9 UNSPECIFIED HEMORRHOIDS: ICD-10-CM

## 2021-12-03 LAB
GLUCOSE BLDC GLUCOMTR-MCNC: 104 MG/DL — HIGH (ref 70–99)
GLUCOSE BLDC GLUCOMTR-MCNC: 159 MG/DL — HIGH (ref 70–99)

## 2021-12-03 PROCEDURE — 99232 SBSQ HOSP IP/OBS MODERATE 35: CPT

## 2021-12-03 PROCEDURE — 99231 SBSQ HOSP IP/OBS SF/LOW 25: CPT | Mod: GC

## 2021-12-03 RX ORDER — POLYETHYLENE GLYCOL 3350 17 G/17G
17 POWDER, FOR SOLUTION ORAL DAILY
Refills: 0 | Status: DISCONTINUED | OUTPATIENT
Start: 2021-12-03 | End: 2021-12-07

## 2021-12-03 RX ORDER — HYDROCORTISONE 1 %
1 OINTMENT (GRAM) TOPICAL EVERY 12 HOURS
Refills: 0 | Status: DISCONTINUED | OUTPATIENT
Start: 2021-12-03 | End: 2021-12-07

## 2021-12-03 RX ADMIN — Medication 1 APPLICATION(S): at 06:07

## 2021-12-03 RX ADMIN — Medication 1 MILLIGRAM(S): at 21:09

## 2021-12-03 RX ADMIN — SENNA PLUS 1 TABLET(S): 8.6 TABLET ORAL at 06:05

## 2021-12-03 RX ADMIN — SENNA PLUS 1 TABLET(S): 8.6 TABLET ORAL at 17:22

## 2021-12-03 RX ADMIN — Medication 0.25 MILLIGRAM(S): at 17:22

## 2021-12-03 RX ADMIN — Medication 1 APPLICATION(S): at 17:18

## 2021-12-03 RX ADMIN — OLANZAPINE 5 MILLIGRAM(S): 15 TABLET, FILM COATED ORAL at 21:11

## 2021-12-03 RX ADMIN — Medication 75 MICROGRAM(S): at 06:04

## 2021-12-03 RX ADMIN — Medication 0.25 MILLIGRAM(S): at 06:06

## 2021-12-03 RX ADMIN — CARBIDOPA AND LEVODOPA 1 TABLET(S): 25; 100 TABLET ORAL at 06:04

## 2021-12-03 RX ADMIN — DIVALPROEX SODIUM 500 MILLIGRAM(S): 500 TABLET, DELAYED RELEASE ORAL at 06:04

## 2021-12-03 RX ADMIN — CARBIDOPA AND LEVODOPA 1 TABLET(S): 25; 100 TABLET ORAL at 21:11

## 2021-12-03 RX ADMIN — Medication 1 MILLIGRAM(S): at 10:41

## 2021-12-03 RX ADMIN — Medication 1 APPLICATION(S): at 06:05

## 2021-12-03 RX ADMIN — Medication 1 APPLICATION(S): at 17:19

## 2021-12-03 RX ADMIN — POLYETHYLENE GLYCOL 3350 17 GRAM(S): 17 POWDER, FOR SOLUTION ORAL at 14:54

## 2021-12-03 RX ADMIN — LACTULOSE 10 GRAM(S): 10 SOLUTION ORAL at 21:11

## 2021-12-03 RX ADMIN — Medication 1 APPLICATION(S): at 06:06

## 2021-12-03 RX ADMIN — ENOXAPARIN SODIUM 40 MILLIGRAM(S): 100 INJECTION SUBCUTANEOUS at 14:55

## 2021-12-03 RX ADMIN — DIVALPROEX SODIUM 750 MILLIGRAM(S): 500 TABLET, DELAYED RELEASE ORAL at 21:10

## 2021-12-03 RX ADMIN — QUETIAPINE FUMARATE 50 MILLIGRAM(S): 200 TABLET, FILM COATED ORAL at 12:03

## 2021-12-03 RX ADMIN — CARBIDOPA AND LEVODOPA 1 TABLET(S): 25; 100 TABLET ORAL at 14:54

## 2021-12-03 RX ADMIN — Medication 25 MILLIGRAM(S): at 17:22

## 2021-12-03 RX ADMIN — OLANZAPINE 5 MILLIGRAM(S): 15 TABLET, FILM COATED ORAL at 06:04

## 2021-12-03 RX ADMIN — OLANZAPINE 5 MILLIGRAM(S): 15 TABLET, FILM COATED ORAL at 14:54

## 2021-12-03 NOTE — PROGRESS NOTE ADULT - ATTENDING COMMENTS
70W PMH of HTN, T2DM, asthma, Parkinson's disease, seizure disorder, schizophrenia BIBEMS from [facility] for acute change in mental status and episode of hypotension. Remains medically stable now. Psychiatrically needs further improvement. Now needs to be discharged to psych facility for continued treatment.     # Schizophrenia - remains decompensated and will need inpatient psych facility on discharge.     Dispo: unclear. SW involved.     Plan discussed with HS1

## 2021-12-03 NOTE — BH CONSULTATION LIAISON PROGRESS NOTE - NSBHCHARTREVIEWVS_PSY_A_CORE FT
Vital Signs Last 24 Hrs  T(C): 36.8 (03 Dec 2021 06:14), Max: 36.8 (03 Dec 2021 06:14)  T(F): 98.2 (03 Dec 2021 06:14), Max: 98.2 (03 Dec 2021 06:14)  HR: 77 (03 Dec 2021 06:14) (77 - 95)  BP: 101/76 (03 Dec 2021 06:14) (101/76 - 146/78)  BP(mean): --  RR: 18 (03 Dec 2021 06:14) (18 - 18)  SpO2: 100% (03 Dec 2021 06:14) (100% - 100%)

## 2021-12-03 NOTE — PROGRESS NOTE ADULT - ASSESSMENT
71 yo F with PMH of HTN, T2DM, asthma, seizure disorder, Parkinson's disease, glaucoma, schizophrenia, hemorrhoids BIBEMS from [facility] for acute change in mental status i/s/o hypotension. Likely medication-induced given negative brain imaging and negative metabolic and infectious work up. Waiting for acceptance by inpatient psych facility 69 yo F with PMH of HTN, T2DM, asthma, seizure disorder, Parkinson's disease, glaucoma, schizophrenia, hemorrhoids BIBEMS from [facility] for acute change in mental status i/s/o hypotension. Likely medication-induced given negative brain imaging and negative metabolic and infectious work up, now with decompensated schizophrenia. Waiting for acceptance by inpatient psych facility

## 2021-12-03 NOTE — PROGRESS NOTE ADULT - PROBLEM SELECTOR PLAN 6
-Diet: passed dysphagia screen, however slow to swallow per RN. will start pureed diet  -VTE ppx: lovenox 40 mg subQ daily  -Dispo:Pt is medically optimized for discharge. Pending acceptance by inpatient psych facility c/w levothyroxine    -TSH wnl.

## 2021-12-03 NOTE — PROGRESS NOTE ADULT - PROBLEM SELECTOR PLAN 3
Hgb a1c 5.6 on 10/15/21. On metformin at home    -monitor BG on BMP daily scaly approx 3 cm rash on dorsal surface of foot with two <1 cm lesions, skin broken, hyperkeratin on perimeter of rash    - ammonium lactate cream not helping  - trial clotrimazole cream  - consider derm consult

## 2021-12-03 NOTE — PROGRESS NOTE ADULT - SUBJECTIVE AND OBJECTIVE BOX
PROGRESS NOTE:   Authored by Melony Chadwick MD  Internal Medicine  Pager [phone]  Pager [phone]    Patient is a 70y old  Female who presents with a chief complaint of AMS (29 Nov 2021 15:32)      SUBJECTIVE / OVERNIGHT EVENTS: NAEO. patient seen and examined at bedside.    MEDICATIONS  (STANDING):  ammonium lactate   12% Cream 1 Application(s) Topical two times a day  benztropine 0.25 milliGRAM(s) Oral two times a day  carbidopa/levodopa  25/100 1 Tablet(s) Oral three times a day  clotrimazole 1% Cream 1 Application(s) Topical two times a day  diVALproex  milliGRAM(s) Oral <User Schedule>  diVALproex Sprinkle 750 milliGRAM(s) Oral at bedtime  enoxaparin Injectable 40 milliGRAM(s) SubCutaneous daily  hydrocortisone 1% Cream 1 Application(s) Topical every 12 hours  influenza  Vaccine (HIGH DOSE) 0.7 milliLiter(s) IntraMuscular once  lactulose Syrup 10 Gram(s) Oral at bedtime  levothyroxine 75 MICROGram(s) Oral daily  lisinopril 10 milliGRAM(s) Oral daily  LORazepam     Tablet 1 milliGRAM(s) Oral <User Schedule>  metoprolol tartrate 25 milliGRAM(s) Oral two times a day  OLANZapine 5 milliGRAM(s) Oral three times a day  petrolatum white Ointment 1 Application(s) Topical two times a day  senna 1 Tablet(s) Oral two times a day    MEDICATIONS  (PRN):  OLANZapine Injectable 2.5 milliGRAM(s) IntraMuscular every 6 hours PRN agitation  QUEtiapine 50 milliGRAM(s) Oral every 6 hours PRN agitation      CAPILLARY BLOOD GLUCOSE      POCT Blood Glucose.: 131 mg/dL (02 Dec 2021 21:57)  POCT Blood Glucose.: 105 mg/dL (02 Dec 2021 11:57)  POCT Blood Glucose.: 102 mg/dL (02 Dec 2021 07:56)    I&O's Summary      PHYSICAL EXAM:  Vital Signs Last 24 Hrs  T(C): 36.7 (02 Dec 2021 21:15), Max: 36.7 (02 Dec 2021 21:15)  T(F): 98 (02 Dec 2021 21:15), Max: 98 (02 Dec 2021 21:15)  HR: 88 (02 Dec 2021 21:15) (88 - 95)  BP: 146/78 (02 Dec 2021 21:15) (137/74 - 146/78)  BP(mean): --  RR: 18 (02 Dec 2021 21:15) (18 - 18)  SpO2: 100% (02 Dec 2021 21:15) (100% - 100%)  CONSTITUTIONAL: Well-groomed, in no apparent distress  EYES: No conjunctival or scleral injection, non-icteric;   ENMT: No external nasal lesions; MMM  NECK: Trachea midline without palpable neck mass; thyroid not enlarged and non-tender  RESPIRATORY: Breathing comfortably; no dullness to percussion; lungs CTA without wheeze/rhonchi/rales  CARDIOVASCULAR: +S1S2, RRR, no M/G/R; pedal pulses full and symmetric; no lower extremity edema  GASTROINTESTINAL: No palpable masses or tenderness, +BS throughout, no rebound/guarding; no hepatosplenomegaly; no hernia palpated  LYMPHATIC: No cervical LAD or tenderness  SKIN: No rashes or ulcers noted  NEUROLOGIC: CN II-XII intact; sensation intact in LEs b/l to light touch  PSYCHIATRIC: A+O x 3; mood and affect appropriate; appropriate insight and judgment    LABS:                      RADIOLOGY & ADDITIONAL TESTS:  Results Reviewed:   Imaging Personally Reviewed:  Electrocardiogram Personally Reviewed:    COORDINATION OF CARE:  Care Discussed with Consultants/Other Providers [Y/N]:  Prior or Outpatient Records Reviewed [Y/N]:   PROGRESS NOTE:   Authored by Melony Chadwick MD  Internal Medicine  Pager [phone]  Pager [phone]    Patient is a 70y old  Female who presents with a chief complaint of AMS (29 Nov 2021 15:32)      SUBJECTIVE / OVERNIGHT EVENTS: NAEO. patient seen and examined at bedside. very agitated and demanded to be left alone. per nursing, patient agitated, intermittently requiring prn zyprexa. also has painful hemorrhoids    MEDICATIONS  (STANDING):  ammonium lactate   12% Cream 1 Application(s) Topical two times a day  benztropine 0.25 milliGRAM(s) Oral two times a day  carbidopa/levodopa  25/100 1 Tablet(s) Oral three times a day  clotrimazole 1% Cream 1 Application(s) Topical two times a day  diVALproex  milliGRAM(s) Oral <User Schedule>  diVALproex Sprinkle 750 milliGRAM(s) Oral at bedtime  enoxaparin Injectable 40 milliGRAM(s) SubCutaneous daily  hydrocortisone 1% Cream 1 Application(s) Topical every 12 hours  influenza  Vaccine (HIGH DOSE) 0.7 milliLiter(s) IntraMuscular once  lactulose Syrup 10 Gram(s) Oral at bedtime  levothyroxine 75 MICROGram(s) Oral daily  lisinopril 10 milliGRAM(s) Oral daily  LORazepam     Tablet 1 milliGRAM(s) Oral <User Schedule>  metoprolol tartrate 25 milliGRAM(s) Oral two times a day  OLANZapine 5 milliGRAM(s) Oral three times a day  petrolatum white Ointment 1 Application(s) Topical two times a day  senna 1 Tablet(s) Oral two times a day    MEDICATIONS  (PRN):  OLANZapine Injectable 2.5 milliGRAM(s) IntraMuscular every 6 hours PRN agitation  QUEtiapine 50 milliGRAM(s) Oral every 6 hours PRN agitation      CAPILLARY BLOOD GLUCOSE      POCT Blood Glucose.: 131 mg/dL (02 Dec 2021 21:57)  POCT Blood Glucose.: 105 mg/dL (02 Dec 2021 11:57)  POCT Blood Glucose.: 102 mg/dL (02 Dec 2021 07:56)    I&O's Summary      PHYSICAL EXAM:  Vital Signs Last 24 Hrs  T(C): 36.7 (02 Dec 2021 21:15), Max: 36.7 (02 Dec 2021 21:15)  T(F): 98 (02 Dec 2021 21:15), Max: 98 (02 Dec 2021 21:15)  HR: 88 (02 Dec 2021 21:15) (88 - 95)  BP: 146/78 (02 Dec 2021 21:15) (137/74 - 146/78)  BP(mean): --  RR: 18 (02 Dec 2021 21:15) (18 - 18)  SpO2: 100% (02 Dec 2021 21:15) (100% - 100%)  CONSTITUTIONAL: cachetic, agitated, upset, standing up and pacing around room  EYES: No conjunctival or scleral injection, non-icteric;   ENMT: No external nasal lesions; MMM  NECK:  thyroid not enlarged and non-tender  RESPIRATORY: Breathing comfortably; no increased wob,  GASTROINTESTINAL: abdomen nondistended  LYMPHATIC: No cervical LAD or tenderness  SKIN: No rashes or ulcers noted  NEUROLOGIC: CN II-XII intact; sensation intact in LEs b/l to light touch  PSYCHIATRIC: A+O x 3; mood and affect appropriate; appropriate insight and judgment    LABS:                      RADIOLOGY & ADDITIONAL TESTS:  Results Reviewed:   Imaging Personally Reviewed:  Electrocardiogram Personally Reviewed:    COORDINATION OF CARE:  Care Discussed with Consultants/Other Providers [Y/N]:  Prior or Outpatient Records Reviewed [Y/N]:

## 2021-12-03 NOTE — BH CONSULTATION LIAISON PROGRESS NOTE - NSBHASSESSMENTFT_PSY_ALL_CORE
Pt is a 69 y/o AAF, domiciled at [facility], long hx of Schizophrenia with multiple past inpatient hospitalizations, last hospitalization started at [facility] 10/13/21, past hx of aggression towards staff and peers, no documented hx of SA, pertinent PMH includes HLD, HTN, Parkinson's dx. Seizure d/o, T2DM, Hypothyroidism, Glaucoma, PVD, Asthma, Bladder Ca. Pt was found unconscious on the floor at [facility] on 11/20/21, BIBEMS from [facility] for acute change in mental status.     Pt is currently being medically managed by the medicine and neurology team for AMS episode. CT head, CTA head and neck and CT perfusion has been done, grossly negative. R/o infectious and metabolic causes of AMS. Pt care was discussed with Dr. Chung at [facility] on 11/22, patient has been manic, labile, tearful and hyperreligious, consistent with history of schizophrenia, currently decompensated. Pt currently lacks insight, she is paranoid with extremely disorganized thoughts making her a harm to herself and others and requires continued inpatient hospitalization for monitoring and medication reconciliation. Pt has a history of aggression. She is unpredictable and impulsive. Although cooperative today, history of agitation and aggression warrants continued low threshold for PRN and restraints. Current PRN Zyprexa 2.5mg q6hrs, with additional dose if pt lacks response. Rx was restarted on psychiatric medications due to concern for psychiatric decompensation.     Plan:  - Consider Enhanced Supervision given improvements in behavior; inclusive consider 7S/Mindful Care Unit  	- LOW threshold for PRN usage for agitation due to history of aggression  	- Consider restraints if agitation escalates or aggression  - Continue Zyprexa 5mg TID  - Continue Benztropine 0.25mg BID  - Continue Depakote 750mg qHS for seizures and Depakote 500mg in AM. Would defer further titration if needed as per primary/neuro team  - If patient agitated and QTc < 500, can use Zyprexa 2.5mg PO/IM q6h PRN per agitation regimen at [facility]. If open to PO c/w current dose of Seroquel 50mg PO Q 6 PRN   - Consider PT eval (deconditioned? inpatient rehab indicated?)  - Patient likely needs inpatient psychiatric admission, 2PC completed, in pt chart     - SW working on other inpatient psychiatric facility due to insurance issues  - Alternate dispo: inpatient rehab/NH if patient is near baseline and/or physically deconditioned (recruit PT for this assessment if needed)  - Cl Psych to continue to follow

## 2021-12-03 NOTE — PROGRESS NOTE ADULT - PROBLEM SELECTOR PLAN 1
Transferred from [facility]. Decompensated Schizophrenia. w/ disorganized thought process, paranoia, Congregational preoccupations    -psych rec continuing Seroquel 50 mg q6 PRN, Ativan 1mg PRN (for severe agitation) and Zyprexa 2.5 mg IM q6 PRN for agitation and holding Ativan if not having any effect due to deliriogenic properties that may be worsening agitation. No Haldol due to Parkinson's disease  - agitated at times, now calm and cooperative   - per psych discussion with Dr. Chung at [facility], patient has been manic, labile, tearful and hyperreligious while at [facility]  - continue depakote 750mg qhs and Cogentin 0.5mg BID  - c/w Risperdal 1.5mg BID    - c/w Ativan 1 mg PO BID per psych to avoid withdrawal  - decrease cogentin to 0.25mg BID  - psych rec continuing to hold trazodone and gabapentin due to recent AMS and to avoid polypharmacy    - pt will need inpatient psychiatric treatment for further stabilization per psych.

## 2021-12-03 NOTE — PROGRESS NOTE ADULT - PROBLEM SELECTOR PLAN 2
scaly approx 3 cm rash on dorsal surface of foot with two <1 cm lesions, skin broken, hyperkeratin on perimeter of rash    - ammonium lactate cream not helping  - trial clotrimazole cream  - consider derm consult hx of hemorrhoids, per bedside nurse, patient has symptomatic hemorrhoids and painful bowel movements  - senna and miralax  - hemorrhoid cream

## 2021-12-04 LAB
ALBUMIN SERPL ELPH-MCNC: 3.6 G/DL — SIGNIFICANT CHANGE UP (ref 3.3–5)
ALP SERPL-CCNC: 93 U/L — SIGNIFICANT CHANGE UP (ref 40–120)
ALT FLD-CCNC: 16 U/L — SIGNIFICANT CHANGE UP (ref 4–33)
ANION GAP SERPL CALC-SCNC: 11 MMOL/L — SIGNIFICANT CHANGE UP (ref 7–14)
AST SERPL-CCNC: 18 U/L — SIGNIFICANT CHANGE UP (ref 4–32)
BASOPHILS # BLD AUTO: 0.06 K/UL — SIGNIFICANT CHANGE UP (ref 0–0.2)
BASOPHILS NFR BLD AUTO: 0.8 % — SIGNIFICANT CHANGE UP (ref 0–2)
BILIRUB SERPL-MCNC: <0.2 MG/DL — SIGNIFICANT CHANGE UP (ref 0.2–1.2)
BUN SERPL-MCNC: 28 MG/DL — HIGH (ref 7–23)
CALCIUM SERPL-MCNC: 9.5 MG/DL — SIGNIFICANT CHANGE UP (ref 8.4–10.5)
CHLORIDE SERPL-SCNC: 103 MMOL/L — SIGNIFICANT CHANGE UP (ref 98–107)
CO2 SERPL-SCNC: 22 MMOL/L — SIGNIFICANT CHANGE UP (ref 22–31)
CREAT SERPL-MCNC: 0.9 MG/DL — SIGNIFICANT CHANGE UP (ref 0.5–1.3)
EOSINOPHIL # BLD AUTO: 0.22 K/UL — SIGNIFICANT CHANGE UP (ref 0–0.5)
EOSINOPHIL NFR BLD AUTO: 3.1 % — SIGNIFICANT CHANGE UP (ref 0–6)
GLUCOSE BLDC GLUCOMTR-MCNC: 121 MG/DL — HIGH (ref 70–99)
GLUCOSE BLDC GLUCOMTR-MCNC: 132 MG/DL — HIGH (ref 70–99)
GLUCOSE BLDC GLUCOMTR-MCNC: 138 MG/DL — HIGH (ref 70–99)
GLUCOSE SERPL-MCNC: 153 MG/DL — HIGH (ref 70–99)
HCT VFR BLD CALC: 33.3 % — LOW (ref 34.5–45)
HGB BLD-MCNC: 10.4 G/DL — LOW (ref 11.5–15.5)
IANC: 4.25 K/UL — SIGNIFICANT CHANGE UP (ref 1.5–8.5)
IMM GRANULOCYTES NFR BLD AUTO: 0.3 % — SIGNIFICANT CHANGE UP (ref 0–1.5)
LYMPHOCYTES # BLD AUTO: 1.64 K/UL — SIGNIFICANT CHANGE UP (ref 1–3.3)
LYMPHOCYTES # BLD AUTO: 22.9 % — SIGNIFICANT CHANGE UP (ref 13–44)
MAGNESIUM SERPL-MCNC: 2 MG/DL — SIGNIFICANT CHANGE UP (ref 1.6–2.6)
MCHC RBC-ENTMCNC: 27.7 PG — SIGNIFICANT CHANGE UP (ref 27–34)
MCHC RBC-ENTMCNC: 31.2 GM/DL — LOW (ref 32–36)
MCV RBC AUTO: 88.8 FL — SIGNIFICANT CHANGE UP (ref 80–100)
MONOCYTES # BLD AUTO: 0.97 K/UL — HIGH (ref 0–0.9)
MONOCYTES NFR BLD AUTO: 13.5 % — SIGNIFICANT CHANGE UP (ref 2–14)
NEUTROPHILS # BLD AUTO: 4.25 K/UL — SIGNIFICANT CHANGE UP (ref 1.8–7.4)
NEUTROPHILS NFR BLD AUTO: 59.4 % — SIGNIFICANT CHANGE UP (ref 43–77)
NRBC # BLD: 0 /100 WBCS — SIGNIFICANT CHANGE UP
NRBC # FLD: 0 K/UL — SIGNIFICANT CHANGE UP
PHOSPHATE SERPL-MCNC: 4.4 MG/DL — SIGNIFICANT CHANGE UP (ref 2.5–4.5)
PLATELET # BLD AUTO: 250 K/UL — SIGNIFICANT CHANGE UP (ref 150–400)
POTASSIUM SERPL-MCNC: 4.6 MMOL/L — SIGNIFICANT CHANGE UP (ref 3.5–5.3)
POTASSIUM SERPL-SCNC: 4.6 MMOL/L — SIGNIFICANT CHANGE UP (ref 3.5–5.3)
PROT SERPL-MCNC: 7.2 G/DL — SIGNIFICANT CHANGE UP (ref 6–8.3)
RBC # BLD: 3.75 M/UL — LOW (ref 3.8–5.2)
RBC # FLD: 15.6 % — HIGH (ref 10.3–14.5)
SARS-COV-2 RNA SPEC QL NAA+PROBE: SIGNIFICANT CHANGE UP
SODIUM SERPL-SCNC: 136 MMOL/L — SIGNIFICANT CHANGE UP (ref 135–145)
WBC # BLD: 7.16 K/UL — SIGNIFICANT CHANGE UP (ref 3.8–10.5)
WBC # FLD AUTO: 7.16 K/UL — SIGNIFICANT CHANGE UP (ref 3.8–10.5)

## 2021-12-04 PROCEDURE — 99232 SBSQ HOSP IP/OBS MODERATE 35: CPT | Mod: GC

## 2021-12-04 RX ORDER — QUETIAPINE FUMARATE 200 MG/1
50 TABLET, FILM COATED ORAL EVERY 4 HOURS
Refills: 0 | Status: DISCONTINUED | OUTPATIENT
Start: 2021-12-04 | End: 2021-12-07

## 2021-12-04 RX ADMIN — Medication 1 APPLICATION(S): at 05:55

## 2021-12-04 RX ADMIN — Medication 1 APPLICATION(S): at 05:57

## 2021-12-04 RX ADMIN — DIVALPROEX SODIUM 500 MILLIGRAM(S): 500 TABLET, DELAYED RELEASE ORAL at 05:55

## 2021-12-04 RX ADMIN — Medication 1 APPLICATION(S): at 05:56

## 2021-12-04 RX ADMIN — Medication 75 MICROGRAM(S): at 05:54

## 2021-12-04 RX ADMIN — Medication 1 APPLICATION(S): at 17:20

## 2021-12-04 RX ADMIN — LISINOPRIL 10 MILLIGRAM(S): 2.5 TABLET ORAL at 05:54

## 2021-12-04 RX ADMIN — LACTULOSE 10 GRAM(S): 10 SOLUTION ORAL at 22:55

## 2021-12-04 RX ADMIN — CARBIDOPA AND LEVODOPA 1 TABLET(S): 25; 100 TABLET ORAL at 21:40

## 2021-12-04 RX ADMIN — OLANZAPINE 2.5 MILLIGRAM(S): 15 TABLET, FILM COATED ORAL at 23:33

## 2021-12-04 RX ADMIN — Medication 1 MILLIGRAM(S): at 21:40

## 2021-12-04 RX ADMIN — OLANZAPINE 5 MILLIGRAM(S): 15 TABLET, FILM COATED ORAL at 17:18

## 2021-12-04 RX ADMIN — Medication 1 APPLICATION(S): at 06:00

## 2021-12-04 RX ADMIN — SENNA PLUS 1 TABLET(S): 8.6 TABLET ORAL at 05:59

## 2021-12-04 RX ADMIN — Medication 1 APPLICATION(S): at 17:19

## 2021-12-04 RX ADMIN — POLYETHYLENE GLYCOL 3350 17 GRAM(S): 17 POWDER, FOR SOLUTION ORAL at 17:20

## 2021-12-04 RX ADMIN — CARBIDOPA AND LEVODOPA 1 TABLET(S): 25; 100 TABLET ORAL at 17:18

## 2021-12-04 RX ADMIN — Medication 25 MILLIGRAM(S): at 17:18

## 2021-12-04 RX ADMIN — ENOXAPARIN SODIUM 40 MILLIGRAM(S): 100 INJECTION SUBCUTANEOUS at 17:19

## 2021-12-04 RX ADMIN — OLANZAPINE 5 MILLIGRAM(S): 15 TABLET, FILM COATED ORAL at 05:54

## 2021-12-04 RX ADMIN — OLANZAPINE 5 MILLIGRAM(S): 15 TABLET, FILM COATED ORAL at 21:41

## 2021-12-04 RX ADMIN — Medication 25 MILLIGRAM(S): at 05:54

## 2021-12-04 RX ADMIN — SENNA PLUS 1 TABLET(S): 8.6 TABLET ORAL at 17:40

## 2021-12-04 RX ADMIN — DIVALPROEX SODIUM 750 MILLIGRAM(S): 500 TABLET, DELAYED RELEASE ORAL at 22:37

## 2021-12-04 RX ADMIN — Medication 1 MILLIGRAM(S): at 09:17

## 2021-12-04 RX ADMIN — Medication 0.25 MILLIGRAM(S): at 17:18

## 2021-12-04 RX ADMIN — Medication 0.25 MILLIGRAM(S): at 05:54

## 2021-12-04 RX ADMIN — CARBIDOPA AND LEVODOPA 1 TABLET(S): 25; 100 TABLET ORAL at 05:53
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
## 2021-12-04 NOTE — PROGRESS NOTE ADULT - PROBLEM SELECTOR PLAN 2
hx of hemorrhoids, per bedside nurse, patient has symptomatic hemorrhoids and painful bowel movements  - senna and miralax  - hemorrhoid cream

## 2021-12-04 NOTE — PROGRESS NOTE ADULT - PROBLEM SELECTOR PLAN 4
Hgb a1c 5.6 on 10/15/21. On metformin at home    -monitor BG on BMP daily Hgb a1c 5.6 on 10/15/21. On metformin at home    -FS

## 2021-12-04 NOTE — BH CONSULTATION LIAISON PROGRESS NOTE - MSE UNSTRUCTURED FT
Appearance: appears her stated age  Behavior: restless, wandering around her room holding paper towels  Motor: no observed PMR/PMA, appears somewhat stiff when walking  Speech: mumbling, making unintelligible noises  Mood: unable to assess  Affect: unable to assess  Thought Process: unable to assess  Thought Content: no SI/HI elicited  Perception: unable to assess  Cognition: not assessed  Insight: poor  Judgement: poor  Impulsivity: poor

## 2021-12-04 NOTE — PROGRESS NOTE ADULT - PROBLEM SELECTOR PLAN 1
Transferred from [facility]. Decompensated Schizophrenia. w/ disorganized thought process, paranoia, Confucianist preoccupations    -psych rec continuing Seroquel 50 mg q6 PRN, Ativan 1mg PRN (for severe agitation) and Zyprexa 2.5 mg IM q6 PRN for agitation and holding Ativan if not having any effect due to deliriogenic properties that may be worsening agitation. No Haldol due to Parkinson's disease  - agitated at times, now calm and cooperative   - per psych discussion with Dr. Chung at [facility], patient has been manic, labile, tearful and hyperreligious while at [facility]  - continue depakote 750mg qhs and Cogentin 0.5mg BID  - c/w Risperdal 1.5mg BID    - c/w Ativan 1 mg PO BID per psych to avoid withdrawal  - decrease cogentin to 0.25mg BID  - psych rec continuing to hold trazodone and gabapentin due to recent AMS and to avoid polypharmacy    - pt will need inpatient psychiatric treatment for further stabilization per psych. Transferred from [facility]. Decompensated Schizophrenia. w/ disorganized thought process, paranoia, Christian preoccupations    -psych rec continuing Seroquel 50 mg q4 PRN, Ativan 1mg PRN (for severe agitation) and Zyprexa 2.5 mg IM q6 PRN for agitation and holding Ativan if not having any effect due to deliriogenic properties that may be worsening agitation. No Haldol due to Parkinson's disease  - agitated at times, now calm and cooperative   - per psych discussion with Dr. Chung at [facility], patient has been manic, labile, tearful and hyperreligious while at [facility]  - continue depakote 750mg qhs and Cogentin 0.5mg BID  - c/w Risperdal 1.5mg BID    - c/w Ativan 1 mg PO BID per psych to avoid withdrawal  - decrease cogentin to 0.25mg BID  - psych rec continuing to hold trazodone and gabapentin due to recent AMS and to avoid polypharmacy    - pt will need inpatient psychiatric treatment for further stabilization per psych.

## 2021-12-04 NOTE — BH CONSULTATION LIAISON PROGRESS NOTE - NSBHASSESSMENTFT_PSY_ALL_CORE
Pt is a 69 y/o AAF, domiciled at [facility], long hx of Schizophrenia with multiple past inpatient hospitalizations, last hospitalization started at [facility] 10/13/21, past hx of aggression towards staff and peers, no documented hx of SA, pertinent PMH includes HLD, HTN, Parkinson's dx. Seizure d/o, T2DM, Hypothyroidism, Glaucoma, PVD, Asthma, Bladder Ca. Pt was found unconscious on the floor at [facility] on 11/20/21, BIBEMS from [facility] for acute change in mental status.     Pt is currently being medically managed by the medicine and neurology team for AMS episode. CT head, CTA head and neck and CT perfusion has been done, grossly negative. R/o infectious and metabolic causes of AMS. Pt care was discussed with Dr. Chung at [facility] on 11/22, patient has been manic, labile, tearful and hyperreligious, consistent with history of schizophrenia, currently decompensated. Pt currently lacks insight, she is paranoid with extremely disorganized thoughts making her a harm to herself and others and requires continued inpatient hospitalization for monitoring and medication reconciliation. Pt has a history of aggression. She is unpredictable and impulsive. Although cooperative on prior exams, history of agitation and aggression warrants continued low threshold for PRN and restraints. Current PRN Zyprexa 2.5mg q6hrs, with additional dose if pt lacks response. Rx was restarted on psychiatric medications due to concern for psychiatric decompensation.     12/4- patient remains notably disorganized, wandering, with unclear speech, intermittently with irritability/agitation requiring PRN    Plan:  - Consider Enhanced Supervision given improvements in behavior; now transferred to 7S  	- LOW threshold for PRN usage for agitation due to history of aggression  	- Consider restraints if agitation escalates or aggression  - Continue Zyprexa 5mg TID  - Continue Benztropine 0.25mg BID  - Continue Depakote 750mg qHS for seizures and Depakote 500mg in AM. Would defer further titration if needed as per primary/neuro team  - If patient agitated and QTc < 500, can use Zyprexa 2.5mg PO/IM q6h PRN per agitation regimen at [facility]. If open to PO c/w current dose of Seroquel 50mg PO Q 6 PRN   - Consider PT eval (deconditioned? inpatient rehab indicated?)  - Patient likely needs inpatient psychiatric admission, 2PC completed, in pt chart     - SW working on other inpatient psychiatric facility due to insurance issues  - Alternate dispo: inpatient rehab/NH if patient is near baseline and/or physically deconditioned (recruit PT for this assessment if needed)  - Cl Psych to continue to follow   Pt is a 71 y/o AAF, domiciled at [facility], long hx of Schizophrenia with multiple past inpatient hospitalizations, last hospitalization started at [facility] 10/13/21, past hx of aggression towards staff and peers, no documented hx of SA, pertinent PMH includes HLD, HTN, Parkinson's dx. Seizure d/o, T2DM, Hypothyroidism, Glaucoma, PVD, Asthma, Bladder Ca. Pt was found unconscious on the floor at [facility] on 11/20/21, BIBEMS from [facility] for acute change in mental status.     Pt is currently being medically managed by the medicine and neurology team for AMS episode. CT head, CTA head and neck and CT perfusion has been done, grossly negative. R/o infectious and metabolic causes of AMS. Pt care was discussed with Dr. Chung at [facility] on 11/22, patient has been manic, labile, tearful and hyperreligious, consistent with history of schizophrenia, currently decompensated. Pt currently lacks insight, she is paranoid with extremely disorganized thoughts making her a harm to herself and others and requires continued inpatient hospitalization for monitoring and medication reconciliation. Pt has a history of aggression. She is unpredictable and impulsive. Although cooperative on prior exams, history of agitation and aggression warrants continued low threshold for PRN and restraints. Current PRN Zyprexa 2.5mg q6hrs, with additional dose if pt lacks response. Rx was restarted on psychiatric medications due to concern for psychiatric decompensation.     12/4- patient remains notably disorganized, wandering, with unclear speech, intermittently with irritability/agitation requiring PRN    ***Behavioral Health Service Line working on potential transfer to inpatient psychiatry at [facility] ( team in touch with [facility] Dr. Aguilar). Please obtain CBC, CMP and repeat covid PCR swab so patient's chart may be reviewed by [facility].***    Plan:  - Consider Enhanced Supervision given improvements in behavior; now transferred to 7S  	- LOW threshold for PRN usage for agitation due to history of aggression  	- Consider restraints if agitation escalates or aggression  - Continue Zyprexa 5mg TID  - Continue Benztropine 0.25mg BID  - Continue Depakote 750mg qHS for seizures and Depakote 500mg in AM. Would defer further titration if needed as per primary/neuro team  - If patient agitated and QTc < 500, can use Zyprexa 2.5mg PO/IM q6h PRN per agitation regimen at [facility]. If open to PO c/w current dose of Seroquel 50mg PO Q 4 PRN for agitation  - Consider PT eval (deconditioned? inpatient rehab indicated?)  - Patient likely needs inpatient psychiatric admission, 2PC completed, in pt chart     - SW working on other inpatient psychiatric facility due to insurance issues  - Alternate dispo: inpatient rehab/NH if patient is near baseline and/or physically deconditioned (recruit PT for this assessment if needed)  - Cl Psych to continue to follow   Pt is a 69 y/o AAF, domiciled at [facility], long hx of Schizophrenia with multiple past inpatient hospitalizations, last hospitalization started at [facility] 10/13/21, past hx of aggression towards staff and peers, no documented hx of SA, pertinent PMH includes HLD, HTN, Parkinson's dx. Seizure d/o, T2DM, Hypothyroidism, Glaucoma, PVD, Asthma, Bladder Ca. Pt was found unconscious on the floor at [facility] on 11/20/21, BIBEMS from [facility] for acute change in mental status.     Pt is currently being medically managed by the medicine and neurology team for AMS episode. CT head, CTA head and neck and CT perfusion has been done, grossly negative. R/o infectious and metabolic causes of AMS. Pt care was discussed with Dr. Chung at [facility] on 11/22, patient has been manic, labile, tearful and hyperreligious, consistent with history of schizophrenia, currently decompensated. Pt currently lacks insight, she is paranoid with extremely disorganized thoughts making her a harm to herself and others and requires continued inpatient hospitalization for monitoring and medication reconciliation. Pt has a history of aggression. She is unpredictable and impulsive. Although cooperative on prior exams, history of agitation and aggression warrants continued low threshold for PRN and restraints. Current PRN Zyprexa 2.5mg q6hrs, with additional dose if pt lacks response. Rx was restarted on psychiatric medications due to concern for psychiatric decompensation.     12/4- patient remains notably disorganized, wandering, with unclear speech, intermittently with irritability/agitation requiring PRN    ***Behavioral Health Service Line working on potential transfer to inpatient psychiatry at [facility] ( team in touch with [facility] Dr. Aguilar). Please obtain CBC, CMP and repeat covid PCR swab so patient's chart may be reviewed by [facility].*** Discussed the above with Team 1 Dr. Chadwick.    Plan:  - Consider Enhanced Supervision given improvements in behavior; now transferred to 7S  	- LOW threshold for PRN usage for agitation due to history of aggression  	- Consider restraints if agitation escalates or aggression  - Continue Zyprexa 5mg TID  - Continue Benztropine 0.25mg BID  - Continue Depakote 750mg qHS for seizures and Depakote 500mg in AM. Would defer further titration if needed as per primary/neuro team  - If patient agitated and QTc < 500, can use Zyprexa 2.5mg PO/IM q6h PRN per agitation regimen at [facility]. If open to PO c/w current dose of Seroquel 50mg PO Q 4 PRN for agitation  - Consider PT eval (deconditioned? inpatient rehab indicated?)  - Patient likely needs inpatient psychiatric admission, 2PC completed, in pt chart     - SW working on other inpatient psychiatric facility due to insurance issues  - Alternate dispo: inpatient rehab/NH if patient is near baseline and/or physically deconditioned (recruit PT for this assessment if needed)  - Cl Psych to continue to follow   Pt is a 69 y/o AAF, domiciled at [facility], long hx of Schizophrenia with multiple past inpatient hospitalizations, last hospitalization started at [facility] 10/13/21, past hx of aggression towards staff and peers, no documented hx of SA, pertinent PMH includes HLD, HTN, Parkinson's dx. Seizure d/o, T2DM, Hypothyroidism, Glaucoma, PVD, Asthma, Bladder Ca. Pt was found unconscious on the floor at [facility] on 11/20/21, BIBEMS from [facility] for acute change in mental status.     Pt is currently being medically managed by the medicine and neurology team for AMS episode. CT head, CTA head and neck and CT perfusion has been done, grossly negative. R/o infectious and metabolic causes of AMS. Pt care was discussed with Dr. Chung at [facility] on 11/22, patient has been manic, labile, tearful and hyperreligious, consistent with history of schizophrenia, currently decompensated. Pt currently lacks insight, she is paranoid with extremely disorganized thoughts making her a harm to herself and others and requires continued inpatient hospitalization for monitoring and medication reconciliation. Pt has a history of aggression. She is unpredictable and impulsive. Although cooperative on prior exams, history of agitation and aggression warrants continued low threshold for PRN and restraints. Current PRN Zyprexa 2.5mg q6hrs, with additional dose if pt lacks response. Rx was restarted on psychiatric medications due to concern for psychiatric decompensation.     12/4- patient remains notably disorganized, wandering, with unclear speech, intermittently with irritability/agitation requiring PRN    ***Behavioral Health Service Line working on potential transfer to inpatient psychiatry at [facility] ( team in touch with [facility] Dr. Aguilar). Please obtain CBC, CMP and repeat covid PCR swab so patient's chart may be reviewed by [facility].*** Discussed the above with Team 1 Dr. Chadwick.    Plan:  - Consider Enhanced Supervision given improvements in behavior; now transferred to 7S  	- LOW threshold for PRN usage for agitation due to history of aggression  	- Consider restraints if agitation escalates or aggression  - Continue Zyprexa 5mg TID  - Continue Benztropine 0.25mg BID  - Continue Depakote 750mg qHS for seizures and Depakote 500mg in AM. Would defer further titration if needed as per primary/neuro team  - If patient agitated and QTc < 500, can use Zyprexa 2.5mg PO/IM q6h PRN per agitation regimen at [facility]. If open to PO c/w current dose of Seroquel 50mg PO Q 4 PRN for agitation  - Can consider contacting neurology to discuss whether sinemet may be contributing to current presentation  - Consider PT eval (deconditioned? inpatient rehab indicated?)  - Patient likely needs inpatient psychiatric admission, 2PC completed, in pt chart     - SW working on other inpatient psychiatric facility due to insurance issues  - Alternate dispo: inpatient rehab/NH if patient is near baseline and/or physically deconditioned (recruit PT for this assessment if needed)  - Cl Psych to continue to follow   Pt is a 71 y/o AAF, domiciled at [facility], long hx of Schizophrenia with multiple past inpatient hospitalizations, last hospitalization started at [facility] 10/13/21, past hx of aggression towards staff and peers, no documented hx of SA, pertinent PMH includes HLD, HTN, Parkinson's dx. Seizure d/o, T2DM, Hypothyroidism, Glaucoma, PVD, Asthma, Bladder Ca. Pt was found unconscious on the floor at [facility] on 11/20/21, BIBEMS from [facility] for acute change in mental status.     Pt is currently being medically managed by the medicine and neurology team for AMS episode. CT head, CTA head and neck and CT perfusion has been done, grossly negative. R/o infectious and metabolic causes of AMS. Pt care was discussed with Dr. Chung at [facility] on 11/22, patient has been manic, labile, tearful and hyperreligious, consistent with history of schizophrenia, currently decompensated. Pt currently lacks insight, she is paranoid with extremely disorganized thoughts making her a harm to herself and others and requires continued inpatient hospitalization for monitoring and medication reconciliation. Pt has a history of aggression. She is unpredictable and impulsive. Although cooperative on prior exams, history of agitation and aggression warrants continued low threshold for PRN and restraints. Current PRN Zyprexa 2.5mg q6hrs, with additional dose if pt lacks response. Rx was restarted on psychiatric medications due to concern for psychiatric decompensation.     12/4- patient remains notably disorganized, wandering, with unclear speech, intermittently with irritability/agitation requiring PRN    ***Behavioral Health Service Line working on potential transfer to inpatient psychiatry at [facility] ( team in touch with [facility] via Dr. Aguilar). Please obtain CBC, CMP and repeat covid PCR swab so patient's chart may be reviewed by [facility].*** Discussed the above with Team 1 Dr. Chadwick.    Plan:  - Consider Enhanced Supervision given improvements in behavior; now transferred to 7S  	- LOW threshold for PRN usage for agitation due to history of aggression  	- Consider restraints if agitation escalates or aggression  - Continue Zyprexa 5mg TID  - Continue Benztropine 0.25mg BID  - Continue Depakote 750mg qHS for seizures and Depakote 500mg in AM. Would defer further titration if needed as per primary/neuro team  - If patient agitated and QTc < 500, can use Zyprexa 2.5mg PO/IM q6h PRN per agitation regimen at [facility]. If open to PO c/w current dose of Seroquel 50mg PO Q 4 PRN for agitation  - Can consider contacting neurology to discuss whether sinemet may be contributing to current presentation  - Consider PT eval (deconditioned? inpatient rehab indicated?)  - Patient likely needs inpatient psychiatric admission, 2PC completed, in pt chart     - SW working on other inpatient psychiatric facility due to insurance issues  - Alternate dispo: inpatient rehab/NH if patient is near baseline and/or physically deconditioned (recruit PT for this assessment if needed)  - Cl Psych to continue to follow

## 2021-12-04 NOTE — PROGRESS NOTE ADULT - ATTENDING COMMENTS
Final result of UA was negative. This suggests that flank/back pain is muscular. Should respond to NSAID and time.  If progresses/persists follow up eval. 70W PMH of HTN, T2DM, asthma, Parkinson's disease, seizure disorder, schizophrenia BIBEMS from [facility] for acute change in mental status and episode of hypotension. Remains medically stable now. Psychiatrically needs further improvement. Now needs to be discharged to psych facility for continued treatment.     # Schizophrenia - remains decompensated and will need inpatient psych facility on discharge.     Dispo: unclear. SW involved.     Plan discussed with resident

## 2021-12-04 NOTE — PROGRESS NOTE ADULT - SUBJECTIVE AND OBJECTIVE BOX
PROGRESS NOTE:   Authored by Melony Chadwick MD  Internal Medicine  Pager [phone]  Pager [phone]    Patient is a 70y old  Female who presents with a chief complaint of AMS (29 Nov 2021 15:32)      SUBJECTIVE / OVERNIGHT EVENTS: NAEO.    MEDICATIONS  (STANDING):  ammonium lactate   12% Cream 1 Application(s) Topical two times a day  benztropine 0.25 milliGRAM(s) Oral two times a day  carbidopa/levodopa  25/100 1 Tablet(s) Oral three times a day  clotrimazole 1% Cream 1 Application(s) Topical two times a day  diVALproex  milliGRAM(s) Oral <User Schedule>  diVALproex Sprinkle 750 milliGRAM(s) Oral at bedtime  enoxaparin Injectable 40 milliGRAM(s) SubCutaneous daily  hydrocortisone 1% Cream 1 Application(s) Topical every 12 hours  influenza  Vaccine (HIGH DOSE) 0.7 milliLiter(s) IntraMuscular once  lactulose Syrup 10 Gram(s) Oral at bedtime  levothyroxine 75 MICROGram(s) Oral daily  lisinopril 10 milliGRAM(s) Oral daily  LORazepam     Tablet 1 milliGRAM(s) Oral <User Schedule>  metoprolol tartrate 25 milliGRAM(s) Oral two times a day  OLANZapine 5 milliGRAM(s) Oral three times a day  petrolatum white Ointment 1 Application(s) Topical two times a day  polyethylene glycol 3350 17 Gram(s) Oral daily  senna 1 Tablet(s) Oral two times a day    MEDICATIONS  (PRN):  OLANZapine Injectable 2.5 milliGRAM(s) IntraMuscular every 6 hours PRN agitation  QUEtiapine 50 milliGRAM(s) Oral every 6 hours PRN agitation      CAPILLARY BLOOD GLUCOSE      POCT Blood Glucose.: 104 mg/dL (03 Dec 2021 16:48)  POCT Blood Glucose.: 159 mg/dL (03 Dec 2021 07:26)    I&O's Summary      PHYSICAL EXAM:  Vital Signs Last 24 Hrs  T(C): 36.8 (04 Dec 2021 05:50), Max: 36.8 (04 Dec 2021 05:50)  T(F): 98.3 (04 Dec 2021 05:50), Max: 98.3 (04 Dec 2021 05:50)  HR: 100 (04 Dec 2021 05:50) (70 - 100)  BP: 117/80 (04 Dec 2021 05:50) (107/60 - 137/78)  BP(mean): --  RR: 18 (04 Dec 2021 05:50) (17 - 18)  SpO2: 100% (04 Dec 2021 05:50) (97% - 100%)  CONSTITUTIONAL: Well-groomed, in no apparent distress  EYES: No conjunctival or scleral injection, non-icteric;   ENMT: No external nasal lesions; MMM  NECK: Trachea midline without palpable neck mass; thyroid not enlarged and non-tender  RESPIRATORY: Breathing comfortably; no dullness to percussion; lungs CTA without wheeze/rhonchi/rales  CARDIOVASCULAR: +S1S2, RRR, no M/G/R; pedal pulses full and symmetric; no lower extremity edema  GASTROINTESTINAL: No palpable masses or tenderness, +BS throughout, no rebound/guarding; no hepatosplenomegaly; no hernia palpated  LYMPHATIC: No cervical LAD or tenderness  SKIN: No rashes or ulcers noted  NEUROLOGIC: CN II-XII intact; sensation intact in LEs b/l to light touch  PSYCHIATRIC: A+O x 3; mood and affect appropriate; appropriate insight and judgment    LABS:                      RADIOLOGY & ADDITIONAL TESTS:  Results Reviewed:   Imaging Personally Reviewed:  Electrocardiogram Personally Reviewed:    COORDINATION OF CARE:  Care Discussed with Consultants/Other Providers [Y/N]:  Prior or Outpatient Records Reviewed [Y/N]:   PROGRESS NOTE:   Authored by Melony Chadwick MD  Internal Medicine  Pager [phone] [phone]  Pager [phone]    Patient is a 70y old  Female who presents with a chief complaint of AMS (29 Nov 2021 15:32)      SUBJECTIVE / OVERNIGHT EVENTS: NAEO. Patient seen and examined at bedside today. remains agitated and psychotic, paranoid. Per nursing has been trying to drown her neighbors. and is requiring more PRNs today    MEDICATIONS  (STANDING):  ammonium lactate   12% Cream 1 Application(s) Topical two times a day  benztropine 0.25 milliGRAM(s) Oral two times a day  carbidopa/levodopa  25/100 1 Tablet(s) Oral three times a day  clotrimazole 1% Cream 1 Application(s) Topical two times a day  diVALproex  milliGRAM(s) Oral <User Schedule>  diVALproex Sprinkle 750 milliGRAM(s) Oral at bedtime  enoxaparin Injectable 40 milliGRAM(s) SubCutaneous daily  hydrocortisone 1% Cream 1 Application(s) Topical every 12 hours  influenza  Vaccine (HIGH DOSE) 0.7 milliLiter(s) IntraMuscular once  lactulose Syrup 10 Gram(s) Oral at bedtime  levothyroxine 75 MICROGram(s) Oral daily  lisinopril 10 milliGRAM(s) Oral daily  LORazepam     Tablet 1 milliGRAM(s) Oral <User Schedule>  metoprolol tartrate 25 milliGRAM(s) Oral two times a day  OLANZapine 5 milliGRAM(s) Oral three times a day  petrolatum white Ointment 1 Application(s) Topical two times a day  polyethylene glycol 3350 17 Gram(s) Oral daily  senna 1 Tablet(s) Oral two times a day    MEDICATIONS  (PRN):  OLANZapine Injectable 2.5 milliGRAM(s) IntraMuscular every 6 hours PRN agitation  QUEtiapine 50 milliGRAM(s) Oral every 6 hours PRN agitation      CAPILLARY BLOOD GLUCOSE      POCT Blood Glucose.: 104 mg/dL (03 Dec 2021 16:48)  POCT Blood Glucose.: 159 mg/dL (03 Dec 2021 07:26)    I&O's Summary      PHYSICAL EXAM:  Vital Signs Last 24 Hrs  T(C): 36.8 (04 Dec 2021 05:50), Max: 36.8 (04 Dec 2021 05:50)  T(F): 98.3 (04 Dec 2021 05:50), Max: 98.3 (04 Dec 2021 05:50)  HR: 100 (04 Dec 2021 05:50) (70 - 100)  BP: 117/80 (04 Dec 2021 05:50) (107/60 - 137/78)  BP(mean): --  RR: 18 (04 Dec 2021 05:50) (17 - 18)  SpO2: 100% (04 Dec 2021 05:50) (97% - 100%)  CONSTITUTIONAL:standing up, next to bed singling loudly attempting to pace around room but stopped by PCA  EYES: No conjunctival or scleral injection, non-icteric;   ENMT: No external nasal lesions; MMM  NECK: Trachea midline without enlarged neck mass; thyroid not enlarged  RESPIRATORY: Breathing comfortably; no increased wob, singing loudly at top of voice  CARDIOVASCULAR: no lower extremity edema  GASTROINTESTINAL: nondistended  SKIN: No rashes or ulcers noted  NEUROLOGIC: moves all extremities spontaneously  PSYCHIATRIC paranoid, agitated, delusional    LABS:                      RADIOLOGY & ADDITIONAL TESTS:  Results Reviewed:   Imaging Personally Reviewed:  Electrocardiogram Personally Reviewed:    COORDINATION OF CARE:  Care Discussed with Consultants/Other Providers [Y/N]:  Prior or Outpatient Records Reviewed [Y/N]:

## 2021-12-04 NOTE — PROGRESS NOTE ADULT - ASSESSMENT
71 yo F with PMH of HTN, T2DM, asthma, seizure disorder, Parkinson's disease, glaucoma, schizophrenia, hemorrhoids BIBEMS from [facility] for acute change in mental status i/s/o hypotension. Likely medication-induced given negative brain imaging and negative metabolic and infectious work up, now with decompensated schizophrenia. Waiting for acceptance by inpatient psych facility

## 2021-12-04 NOTE — BH CONSULTATION LIAISON PROGRESS NOTE - NSBHCHARTREVIEWVS_PSY_A_CORE FT
Vital Signs Last 24 Hrs  T(C): 36.8 (04 Dec 2021 05:50), Max: 36.8 (04 Dec 2021 05:50)  T(F): 98.3 (04 Dec 2021 05:50), Max: 98.3 (04 Dec 2021 05:50)  HR: 100 (04 Dec 2021 05:50) (70 - 100)  BP: 117/80 (04 Dec 2021 05:50) (107/60 - 137/78)  BP(mean): --  RR: 18 (04 Dec 2021 05:50) (17 - 18)  SpO2: 100% (04 Dec 2021 05:50) (97% - 100%)

## 2021-12-05 LAB
GLUCOSE BLDC GLUCOMTR-MCNC: 103 MG/DL — HIGH (ref 70–99)
GLUCOSE BLDC GLUCOMTR-MCNC: 112 MG/DL — HIGH (ref 70–99)
GLUCOSE BLDC GLUCOMTR-MCNC: 130 MG/DL — HIGH (ref 70–99)
GLUCOSE BLDC GLUCOMTR-MCNC: 95 MG/DL — SIGNIFICANT CHANGE UP (ref 70–99)

## 2021-12-05 PROCEDURE — 99232 SBSQ HOSP IP/OBS MODERATE 35: CPT | Mod: GC

## 2021-12-05 RX ADMIN — CARBIDOPA AND LEVODOPA 1 TABLET(S): 25; 100 TABLET ORAL at 14:52

## 2021-12-05 RX ADMIN — DIVALPROEX SODIUM 500 MILLIGRAM(S): 500 TABLET, DELAYED RELEASE ORAL at 06:35

## 2021-12-05 RX ADMIN — Medication 1 APPLICATION(S): at 17:24

## 2021-12-05 RX ADMIN — Medication 1 APPLICATION(S): at 06:34

## 2021-12-05 RX ADMIN — Medication 1 APPLICATION(S): at 06:37

## 2021-12-05 RX ADMIN — Medication 0.25 MILLIGRAM(S): at 17:07

## 2021-12-05 RX ADMIN — OLANZAPINE 2.5 MILLIGRAM(S): 15 TABLET, FILM COATED ORAL at 19:59

## 2021-12-05 RX ADMIN — DIVALPROEX SODIUM 750 MILLIGRAM(S): 500 TABLET, DELAYED RELEASE ORAL at 22:00

## 2021-12-05 RX ADMIN — OLANZAPINE 5 MILLIGRAM(S): 15 TABLET, FILM COATED ORAL at 22:00

## 2021-12-05 RX ADMIN — Medication 75 MICROGRAM(S): at 06:35

## 2021-12-05 RX ADMIN — SENNA PLUS 1 TABLET(S): 8.6 TABLET ORAL at 06:35

## 2021-12-05 RX ADMIN — QUETIAPINE FUMARATE 50 MILLIGRAM(S): 200 TABLET, FILM COATED ORAL at 22:00

## 2021-12-05 RX ADMIN — LACTULOSE 10 GRAM(S): 10 SOLUTION ORAL at 22:03

## 2021-12-05 RX ADMIN — OLANZAPINE 5 MILLIGRAM(S): 15 TABLET, FILM COATED ORAL at 06:37

## 2021-12-05 RX ADMIN — Medication 0.25 MILLIGRAM(S): at 06:36

## 2021-12-05 RX ADMIN — Medication 1 MILLIGRAM(S): at 22:03

## 2021-12-05 RX ADMIN — SENNA PLUS 1 TABLET(S): 8.6 TABLET ORAL at 17:06

## 2021-12-05 RX ADMIN — POLYETHYLENE GLYCOL 3350 17 GRAM(S): 17 POWDER, FOR SOLUTION ORAL at 11:39

## 2021-12-05 RX ADMIN — Medication 25 MILLIGRAM(S): at 06:36

## 2021-12-05 RX ADMIN — OLANZAPINE 5 MILLIGRAM(S): 15 TABLET, FILM COATED ORAL at 14:52

## 2021-12-05 RX ADMIN — LISINOPRIL 10 MILLIGRAM(S): 2.5 TABLET ORAL at 06:35

## 2021-12-05 RX ADMIN — CARBIDOPA AND LEVODOPA 1 TABLET(S): 25; 100 TABLET ORAL at 06:34

## 2021-12-05 RX ADMIN — CARBIDOPA AND LEVODOPA 1 TABLET(S): 25; 100 TABLET ORAL at 22:04

## 2021-12-05 RX ADMIN — Medication 1 MILLIGRAM(S): at 10:28

## 2021-12-05 RX ADMIN — Medication 1 APPLICATION(S): at 17:09

## 2021-12-05 RX ADMIN — ENOXAPARIN SODIUM 40 MILLIGRAM(S): 100 INJECTION SUBCUTANEOUS at 11:39

## 2021-12-05 NOTE — BH CONSULTATION LIAISON PROGRESS NOTE - NSBHFUPINTERVALHXFT_PSY_A_CORE
Patient seen in f/u for psychosis and agitation. Pt seen kneeling at foot of bed as if praying, refuses to engage with writer, says she only will answer questions if she can have a glass of water. Pt remains silent for rest of interview. Per bedside staff, pt threw cup of water this morning at staff. Received PRN olanzapine 2.5mg IM last night around 22:30, after which pt slept rest of night

## 2021-12-05 NOTE — BH CONSULTATION LIAISON PROGRESS NOTE - MSE UNSTRUCTURED FT
Appearance: appears her stated age  Behavior: kneeling at bedside as if praying  Motor: no observed PMR/PMA, appears somewhat stiff when walking  Speech: refuses to speak with writer  Mood: unable to assess  Affect: unable to assess  Thought Process: unable to assess  Thought Content: no SI/HI elicited  Perception: unable to assess  Cognition: not assessed  Insight: poor  Judgement: poor  Impulsivity: poor

## 2021-12-05 NOTE — BH CONSULTATION LIAISON PROGRESS NOTE - NSBHINDICATION_PSY_ALL_CORE
uncooperative, disorganized, poor insight and history of aggression towards staff
impulsivity
uncooperative, disorganized, poor insight and history of aggression towards staff
uncooperative, disorganized, poor insight and history of aggression towards staff

## 2021-12-05 NOTE — BH CONSULTATION LIAISON PROGRESS NOTE - NSBHASSESSMENTFT_PSY_ALL_CORE
Pt is a 71 y/o AAF, domiciled at [facility], long hx of Schizophrenia with multiple past inpatient hospitalizations, last hospitalization started at [facility] 10/13/21, past hx of aggression towards staff and peers, no documented hx of SA, pertinent PMH includes HLD, HTN, Parkinson's dx. Seizure d/o, T2DM, Hypothyroidism, Glaucoma, PVD, Asthma, Bladder Ca. Pt was found unconscious on the floor at [facility] on 11/20/21, BIBEMS from [facility] for acute change in mental status.     Pt is currently being medically managed by the medicine and neurology team for AMS episode. CT head, CTA head and neck and CT perfusion has been done, grossly negative. R/o infectious and metabolic causes of AMS. Pt care was discussed with Dr. Chung at [facility] on 11/22, patient has been manic, labile, tearful and hyperreligious, consistent with history of schizophrenia, currently decompensated. Pt currently lacks insight, she is paranoid with extremely disorganized thoughts making her a harm to herself and others and requires continued inpatient hospitalization for monitoring and medication reconciliation. Pt has a history of aggression. She is unpredictable and impulsive. Although cooperative on prior exams, history of agitation and aggression warrants continued low threshold for PRN and restraints. Current PRN Zyprexa 2.5mg q6hrs, with additional dose if pt lacks response. Rx was restarted on psychiatric medications due to concern for psychiatric decompensation.     12/4- patient remains notably disorganized, wandering, with unclear speech, intermittently with irritability/agitation requiring PRN  12/5 - patient irritable, refuses to engage with psychiatric interview, intermittent agitation requiring PRN    ***Behavioral Health Service Line working on potential transfer to inpatient psychiatry at [facility] (CL team in touch with [facility] via Dr. Aguilar). Please obtain CBC, CMP and repeat covid PCR swab so patient's chart may be reviewed by [facility].*** Discussed the above with Team 1 Dr. Chadwick.    Plan:  - Consider Enhanced Supervision given improvements in behavior; now transferred to 7S  	- LOW threshold for PRN usage for agitation due to history of aggression  	- Consider restraints if agitation escalates or aggression  - Continue Zyprexa 5mg TID  - Continue Benztropine 0.25mg BID  - Continue Depakote 750mg qHS for seizures and Depakote 500mg in AM. Would defer further titration if needed as per primary/neuro team  - If patient agitated and QTc < 500, can use Zyprexa 2.5mg PO/IM q6h PRN per agitation regimen at [facility]. If open to PO c/w current dose of Seroquel 50mg PO Q 4 PRN for agitation  - Can consider contacting neurology to discuss whether sinemet may be contributing to current presentation  - Consider PT eval (deconditioned? inpatient rehab indicated?)  - Patient likely needs inpatient psychiatric admission, 2PC completed, in pt chart     - SW working on other inpatient psychiatric facility due to insurance issues  - Alternate dispo: inpatient rehab/NH if patient is near baseline and/or physically deconditioned (recruit PT for this assessment if needed)  - Cl Psych to continue to follow   Pt is a 71 y/o AAF, domiciled at [facility], long hx of Schizophrenia with multiple past inpatient hospitalizations, last hospitalization started at [facility] 10/13/21, past hx of aggression towards staff and peers, no documented hx of SA, pertinent PMH includes HLD, HTN, Parkinson's dx. Seizure d/o, T2DM, Hypothyroidism, Glaucoma, PVD, Asthma, Bladder Ca. Pt was found unconscious on the floor at [facility] on 11/20/21, BIBEMS from [facility] for acute change in mental status.     Pt is currently being medically managed by the medicine and neurology team for AMS episode. CT head, CTA head and neck and CT perfusion has been done, grossly negative. R/o infectious and metabolic causes of AMS. Pt care was discussed with Dr. Chung at [facility] on 11/22, patient has been manic, labile, tearful and hyperreligious, consistent with history of schizophrenia, currently decompensated. Pt currently lacks insight, she is paranoid with extremely disorganized thoughts making her a harm to herself and others and requires continued inpatient hospitalization for monitoring and medication reconciliation. Pt has a history of aggression. She is unpredictable and impulsive. Although cooperative on prior exams, history of agitation and aggression warrants continued low threshold for PRN and restraints. Current PRN Zyprexa 2.5mg q6hrs, with additional dose if pt lacks response. Rx was restarted on psychiatric medications due to concern for psychiatric decompensation.     12/4- patient remains notably disorganized, wandering, with unclear speech, intermittently with irritability/agitation requiring PRN  12/5 - patient irritable, refuses to engage with psychiatric interview, intermittent agitation requiring PRN    Plan:  - Consider Enhanced Supervision given improvements in behavior; now transferred to 7S  	- LOW threshold for PRN usage for agitation due to history of aggression  	- Consider restraints if agitation escalates or aggression  - Continue Zyprexa 5mg TID  - Continue Benztropine 0.25mg BID  - Continue Depakote 750mg qHS for seizures and Depakote 500mg in AM. Would defer further titration if needed as per primary/neuro team  - If patient agitated and QTc < 500, can use Zyprexa 2.5mg PO/IM q6h PRN per agitation regimen at [facility]. If open to PO c/w current dose of Seroquel 50mg PO Q 4 PRN for agitation  - Can consider contacting neurology to discuss whether sinemet may be contributing to current presentation  - Consider PT eval (deconditioned? inpatient rehab indicated?)  - Patient needs inpatient psychiatric admission, 2PC completed, in pt chart: going [facility] on Monday Dec 6 per SL  - Cl Psych to continue to follow

## 2021-12-05 NOTE — PROGRESS NOTE ADULT - SUBJECTIVE AND OBJECTIVE BOX
PROGRESS NOTE:   Authored by Dr. Tereza Baker MD (PGY-2). Pager [facility] [phone] / [facility] [id number]    Patient is a 70y old  Female who presents with a chief complaint of AMS (29 Nov 2021 15:32)      SUBJECTIVE / OVERNIGHT EVENTS:  No acute events overnight.     ADDITIONAL REVIEW OF SYSTEMS:  Patient denies fevers, chills, chest pain, shortness of breath, nausea, abdominal pain, diarrhea, constipation, dysuria, leg swelling, headache, light headedness.    MEDICATIONS  (STANDING):  ammonium lactate   12% Cream 1 Application(s) Topical two times a day  benztropine 0.25 milliGRAM(s) Oral two times a day  carbidopa/levodopa  25/100 1 Tablet(s) Oral three times a day  clotrimazole 1% Cream 1 Application(s) Topical two times a day  diVALproex  milliGRAM(s) Oral <User Schedule>  diVALproex Sprinkle 750 milliGRAM(s) Oral at bedtime  enoxaparin Injectable 40 milliGRAM(s) SubCutaneous daily  hydrocortisone 1% Cream 1 Application(s) Topical every 12 hours  influenza  Vaccine (HIGH DOSE) 0.7 milliLiter(s) IntraMuscular once  lactulose Syrup 10 Gram(s) Oral at bedtime  levothyroxine 75 MICROGram(s) Oral daily  lisinopril 10 milliGRAM(s) Oral daily  LORazepam     Tablet 1 milliGRAM(s) Oral <User Schedule>  metoprolol tartrate 25 milliGRAM(s) Oral two times a day  OLANZapine 5 milliGRAM(s) Oral three times a day  petrolatum white Ointment 1 Application(s) Topical two times a day  polyethylene glycol 3350 17 Gram(s) Oral daily  senna 1 Tablet(s) Oral two times a day    MEDICATIONS  (PRN):  OLANZapine Injectable 2.5 milliGRAM(s) IntraMuscular every 6 hours PRN agitation  QUEtiapine 50 milliGRAM(s) Oral every 4 hours PRN agitation      CAPILLARY BLOOD GLUCOSE      POCT Blood Glucose.: 132 mg/dL (04 Dec 2021 21:18)  POCT Blood Glucose.: 138 mg/dL (04 Dec 2021 16:40)  POCT Blood Glucose.: 121 mg/dL (04 Dec 2021 07:44)    I&O's Summary      PHYSICAL EXAM:  Vital Signs Last 24 Hrs  T(C): 36.7 (04 Dec 2021 12:20), Max: 36.7 (04 Dec 2021 12:20)  T(F): 98.1 (04 Dec 2021 12:20), Max: 98.1 (04 Dec 2021 12:20)  HR: 89 (04 Dec 2021 12:20) (89 - 89)  BP: 121/68 (04 Dec 2021 12:20) (121/68 - 121/68)  BP(mean): --  RR: 18 (04 Dec 2021 12:20) (18 - 18)  SpO2: 100% (04 Dec 2021 12:20) (100% - 100%)    CONSTITUTIONAL: NAD, well-developed  RESPIRATORY: Normal respiratory effort; lungs are clear to auscultation bilaterally  CARDIOVASCULAR: Regular rate and rhythm, normal S1 and S2, no murmur/rub/gallop; No lower extremity edema; Peripheral pulses are 2+ bilaterally  ABDOMEN: Nontender to palpation, normoactive bowel sounds, no rebound/guarding; No hepatosplenomegaly  MUSCLOSKELETAL: no clubbing or cyanosis of digits; no joint swelling or tenderness to palpation  PSYCH: A+O to person, place, and time; affect appropriate    LABS:                        10.4   7.16  )-----------( 250      ( 04 Dec 2021 16:37 )             33.3     12-04    136  |  103  |  28<H>  ----------------------------<  153<H>  4.6   |  22  |  0.90    Ca    9.5      04 Dec 2021 16:37  Phos  4.4     12-04  Mg     2.00     12-04    TPro  7.2  /  Alb  3.6  /  TBili  <0.2  /  DBili  x   /  AST  18  /  ALT  16  /  AlkPhos  93  12-04                Tele Reviewed:    RADIOLOGY & ADDITIONAL TESTS:  Results Reviewed:   Imaging Personally Reviewed:  Electrocardiogram Personally Reviewed:     PROGRESS NOTE:   Authored by Dr. Tereza Baker MD (PGY-2). Pager [facility] [phone] / [facility] [id number]    Patient is a 70y old  Female who presents with a chief complaint of AMS (29 Nov 2021 15:32)      SUBJECTIVE / OVERNIGHT EVENTS:  No acute events overnight. 1x 2.5 mg Zyprexa utilized. Otherwise no new symptoms/concerns. Pt this am reported to be throwing water at neighbors.     ADDITIONAL REVIEW OF SYSTEMS:  Patient denies fevers, chills, chest pain, shortness of breath, nausea, abdominal pain, diarrhea, constipation, dysuria, leg swelling, headache, light headedness.    MEDICATIONS  (STANDING):  ammonium lactate   12% Cream 1 Application(s) Topical two times a day  benztropine 0.25 milliGRAM(s) Oral two times a day  carbidopa/levodopa  25/100 1 Tablet(s) Oral three times a day  clotrimazole 1% Cream 1 Application(s) Topical two times a day  diVALproex  milliGRAM(s) Oral <User Schedule>  diVALproex Sprinkle 750 milliGRAM(s) Oral at bedtime  enoxaparin Injectable 40 milliGRAM(s) SubCutaneous daily  hydrocortisone 1% Cream 1 Application(s) Topical every 12 hours  influenza  Vaccine (HIGH DOSE) 0.7 milliLiter(s) IntraMuscular once  lactulose Syrup 10 Gram(s) Oral at bedtime  levothyroxine 75 MICROGram(s) Oral daily  lisinopril 10 milliGRAM(s) Oral daily  LORazepam     Tablet 1 milliGRAM(s) Oral <User Schedule>  metoprolol tartrate 25 milliGRAM(s) Oral two times a day  OLANZapine 5 milliGRAM(s) Oral three times a day  petrolatum white Ointment 1 Application(s) Topical two times a day  polyethylene glycol 3350 17 Gram(s) Oral daily  senna 1 Tablet(s) Oral two times a day    MEDICATIONS  (PRN):  OLANZapine Injectable 2.5 milliGRAM(s) IntraMuscular every 6 hours PRN agitation  QUEtiapine 50 milliGRAM(s) Oral every 4 hours PRN agitation      CAPILLARY BLOOD GLUCOSE      POCT Blood Glucose.: 132 mg/dL (04 Dec 2021 21:18)  POCT Blood Glucose.: 138 mg/dL (04 Dec 2021 16:40)  POCT Blood Glucose.: 121 mg/dL (04 Dec 2021 07:44)    I&O's Summary      PHYSICAL EXAM:  Vital Signs Last 24 Hrs  T(C): 36.7 (04 Dec 2021 12:20), Max: 36.7 (04 Dec 2021 12:20)  T(F): 98.1 (04 Dec 2021 12:20), Max: 98.1 (04 Dec 2021 12:20)  HR: 89 (04 Dec 2021 12:20) (89 - 89)  BP: 121/68 (04 Dec 2021 12:20) (121/68 - 121/68)  BP(mean): --  RR: 18 (04 Dec 2021 12:20) (18 - 18)  SpO2: 100% (04 Dec 2021 12:20) (100% - 100%)    CONSTITUTIONAL: NAD, well-developed, frequent lip smacking   RESPIRATORY: Normal respiratory effort; lungs are clear to auscultation bilaterally  CARDIOVASCULAR: Regular rate and rhythm, normal S1 and S2, no murmur/rub/gallop; No lower extremity edema  ABDOMEN: Nontender to palpation, normoactive bowel sounds, no rebound/guarding  MUSCLOSKELETAL: no clubbing or cyanosis of digits; no joint swelling or tenderness to palpation  PSYCH: disorganized thought process, tangential speech     LABS:                        10.4   7.16  )-----------( 250      ( 04 Dec 2021 16:37 )             33.3     12-04    136  |  103  |  28<H>  ----------------------------<  153<H>  4.6   |  22  |  0.90    Ca    9.5      04 Dec 2021 16:37  Phos  4.4     12-04  Mg     2.00     12-04    TPro  7.2  /  Alb  3.6  /  TBili  <0.2  /  DBili  x   /  AST  18  /  ALT  16  /  AlkPhos  93  12-04

## 2021-12-05 NOTE — BH CONSULTATION LIAISON PROGRESS NOTE - NSBHCHARTREVIEWVS_PSY_A_CORE FT
Vital Signs Last 24 Hrs  T(C): 36.4 (05 Dec 2021 06:00), Max: 36.7 (04 Dec 2021 12:20)  T(F): 97.5 (05 Dec 2021 06:00), Max: 98.1 (04 Dec 2021 12:20)  HR: 93 (05 Dec 2021 06:00) (89 - 93)  BP: 124/89 (05 Dec 2021 06:00) (121/68 - 124/89)  BP(mean): --  RR: 16 (05 Dec 2021 06:00) (16 - 18)  SpO2: 100% (05 Dec 2021 06:00) (100% - 100%)

## 2021-12-05 NOTE — PROGRESS NOTE ADULT - PROBLEM SELECTOR PLAN 7
-Diet: passed dysphagia screen, however slow to swallow per RN. will start pureed diet  -VTE ppx: lovenox 40 mg subQ daily  -Dispo:Pt is medically optimized for discharge. Pending acceptance by inpatient psych facility. May be considered by [facility], will send covid swab, CBC, CMP

## 2021-12-05 NOTE — PROGRESS NOTE ADULT - ATTENDING COMMENTS
70W PMH of HTN, T2DM, asthma, Parkinson's disease, seizure disorder, schizophrenia BIBEMS from [facility] for acute change in mental status and episode of hypotension. Remains medically stable now. Psychiatrically needs further improvement. Now needs to be discharged to psych facility for continued treatment. pt had thrown cup of water at staff,     # Schizophrenia - remains decompensated and will need inpatient psych facility on discharge.  Psych f/u appreciated, c/w current regimen , LOW threshold for PRN usage for agitation due to history of aggression and can Consider restraints if agitation escalates or aggression as per psych, c/w Enhanced supervision     Dispo: unclear. SW involved.     Plan discussed with resident

## 2021-12-06 LAB
GLUCOSE BLDC GLUCOMTR-MCNC: 117 MG/DL — HIGH (ref 70–99)
GLUCOSE BLDC GLUCOMTR-MCNC: 177 MG/DL — HIGH (ref 70–99)

## 2021-12-06 PROCEDURE — 99233 SBSQ HOSP IP/OBS HIGH 50: CPT

## 2021-12-06 PROCEDURE — 99231 SBSQ HOSP IP/OBS SF/LOW 25: CPT | Mod: GC

## 2021-12-06 RX ORDER — QUETIAPINE FUMARATE 200 MG/1
50 TABLET, FILM COATED ORAL ONCE
Refills: 0 | Status: COMPLETED | OUTPATIENT
Start: 2021-12-06 | End: 2021-12-06

## 2021-12-06 RX ADMIN — Medication 0.25 MILLIGRAM(S): at 17:14

## 2021-12-06 RX ADMIN — ENOXAPARIN SODIUM 40 MILLIGRAM(S): 100 INJECTION SUBCUTANEOUS at 11:25

## 2021-12-06 RX ADMIN — POLYETHYLENE GLYCOL 3350 17 GRAM(S): 17 POWDER, FOR SOLUTION ORAL at 11:24

## 2021-12-06 RX ADMIN — Medication 25 MILLIGRAM(S): at 17:14

## 2021-12-06 RX ADMIN — Medication 1 APPLICATION(S): at 17:15

## 2021-12-06 RX ADMIN — Medication 25 MILLIGRAM(S): at 06:39

## 2021-12-06 RX ADMIN — Medication 1 MILLIGRAM(S): at 20:19

## 2021-12-06 RX ADMIN — SENNA PLUS 1 TABLET(S): 8.6 TABLET ORAL at 17:14

## 2021-12-06 RX ADMIN — OLANZAPINE 5 MILLIGRAM(S): 15 TABLET, FILM COATED ORAL at 06:39

## 2021-12-06 RX ADMIN — CARBIDOPA AND LEVODOPA 1 TABLET(S): 25; 100 TABLET ORAL at 22:19

## 2021-12-06 RX ADMIN — DIVALPROEX SODIUM 500 MILLIGRAM(S): 500 TABLET, DELAYED RELEASE ORAL at 06:39

## 2021-12-06 RX ADMIN — Medication 1 MILLIGRAM(S): at 09:29

## 2021-12-06 RX ADMIN — LACTULOSE 10 GRAM(S): 10 SOLUTION ORAL at 22:20

## 2021-12-06 RX ADMIN — OLANZAPINE 5 MILLIGRAM(S): 15 TABLET, FILM COATED ORAL at 11:25

## 2021-12-06 RX ADMIN — CARBIDOPA AND LEVODOPA 1 TABLET(S): 25; 100 TABLET ORAL at 06:41

## 2021-12-06 RX ADMIN — CARBIDOPA AND LEVODOPA 1 TABLET(S): 25; 100 TABLET ORAL at 11:24

## 2021-12-06 RX ADMIN — DIVALPROEX SODIUM 750 MILLIGRAM(S): 500 TABLET, DELAYED RELEASE ORAL at 22:20

## 2021-12-06 RX ADMIN — Medication 1 APPLICATION(S): at 17:14

## 2021-12-06 RX ADMIN — Medication 0.25 MILLIGRAM(S): at 06:39

## 2021-12-06 RX ADMIN — Medication 75 MICROGRAM(S): at 06:40

## 2021-12-06 RX ADMIN — LISINOPRIL 10 MILLIGRAM(S): 2.5 TABLET ORAL at 06:40

## 2021-12-06 RX ADMIN — Medication 1 APPLICATION(S): at 06:41

## 2021-12-06 RX ADMIN — Medication 1 APPLICATION(S): at 06:42

## 2021-12-06 RX ADMIN — SENNA PLUS 1 TABLET(S): 8.6 TABLET ORAL at 06:40

## 2021-12-06 RX ADMIN — QUETIAPINE FUMARATE 50 MILLIGRAM(S): 200 TABLET, FILM COATED ORAL at 20:19

## 2021-12-06 RX ADMIN — OLANZAPINE 5 MILLIGRAM(S): 15 TABLET, FILM COATED ORAL at 22:19

## 2021-12-06 RX ADMIN — OLANZAPINE 2.5 MILLIGRAM(S): 15 TABLET, FILM COATED ORAL at 20:19

## 2021-12-06 NOTE — PROGRESS NOTE ADULT - SUBJECTIVE AND OBJECTIVE BOX
PROGRESS NOTE:   Authored by Melony Chadwick MD  Internal Medicine  Pager [facility] [phone]  Pager [phone]    Patient is a 70y old  Female who presents with a chief complaint of AMS (29 Nov 2021 15:32)      SUBJECTIVE / OVERNIGHT EVENTS: NAEO. Patient seen and examined at bedside    MEDICATIONS  (STANDING):  ammonium lactate   12% Cream 1 Application(s) Topical two times a day  benztropine 0.25 milliGRAM(s) Oral two times a day  carbidopa/levodopa  25/100 1 Tablet(s) Oral three times a day  clotrimazole 1% Cream 1 Application(s) Topical two times a day  diVALproex  milliGRAM(s) Oral <User Schedule>  diVALproex Sprinkle 750 milliGRAM(s) Oral at bedtime  enoxaparin Injectable 40 milliGRAM(s) SubCutaneous daily  hydrocortisone 1% Cream 1 Application(s) Topical every 12 hours  influenza  Vaccine (HIGH DOSE) 0.7 milliLiter(s) IntraMuscular once  lactulose Syrup 10 Gram(s) Oral at bedtime  levothyroxine 75 MICROGram(s) Oral daily  lisinopril 10 milliGRAM(s) Oral daily  LORazepam     Tablet 1 milliGRAM(s) Oral <User Schedule>  metoprolol tartrate 25 milliGRAM(s) Oral two times a day  OLANZapine 5 milliGRAM(s) Oral three times a day  petrolatum white Ointment 1 Application(s) Topical two times a day  polyethylene glycol 3350 17 Gram(s) Oral daily  senna 1 Tablet(s) Oral two times a day    MEDICATIONS  (PRN):  OLANZapine Injectable 2.5 milliGRAM(s) IntraMuscular every 6 hours PRN agitation  QUEtiapine 50 milliGRAM(s) Oral every 4 hours PRN agitation      CAPILLARY BLOOD GLUCOSE      POCT Blood Glucose.: 130 mg/dL (05 Dec 2021 22:15)  POCT Blood Glucose.: 95 mg/dL (05 Dec 2021 16:35)  POCT Blood Glucose.: 112 mg/dL (05 Dec 2021 11:37)  POCT Blood Glucose.: 103 mg/dL (05 Dec 2021 07:34)    I&O's Summary      PHYSICAL EXAM:  Vital Signs Last 24 Hrs  T(C): 36.7 (05 Dec 2021 22:21), Max: 36.8 (05 Dec 2021 13:54)  T(F): 98 (05 Dec 2021 22:21), Max: 98.2 (05 Dec 2021 13:54)  HR: 97 (05 Dec 2021 22:21) (93 - 97)  BP: 127/67 (05 Dec 2021 22:21) (107/50 - 127/67)  BP(mean): --  RR: 16 (05 Dec 2021 22:21) (16 - 17)  SpO2: 100% (05 Dec 2021 22:21) (100% - 100%)  CONSTITUTIONAL: Well-groomed, in no apparent distress  EYES: No conjunctival or scleral injection, non-icteric;   ENMT: No external nasal lesions; MMM  NECK: Trachea midline without palpable neck mass; thyroid not enlarged and non-tender  RESPIRATORY: Breathing comfortably; no dullness to percussion; lungs CTA without wheeze/rhonchi/rales  CARDIOVASCULAR: +S1S2, RRR, no M/G/R; pedal pulses full and symmetric; no lower extremity edema  GASTROINTESTINAL: No palpable masses or tenderness, +BS throughout, no rebound/guarding; no hepatosplenomegaly; no hernia palpated  LYMPHATIC: No cervical LAD or tenderness  SKIN: No rashes or ulcers noted  NEUROLOGIC: CN II-XII intact; sensation intact in LEs b/l to light touch  PSYCHIATRIC: A+O x 3; mood and affect appropriate; appropriate insight and judgment    LABS:                        10.4   7.16  )-----------( 250      ( 04 Dec 2021 16:37 )             33.3     12-04    136  |  103  |  28<H>  ----------------------------<  153<H>  4.6   |  22  |  0.90    Ca    9.5      04 Dec 2021 16:37  Phos  4.4     12-04  Mg     2.00     12-04    TPro  7.2  /  Alb  3.6  /  TBili  <0.2  /  DBili  x   /  AST  18  /  ALT  16  /  AlkPhos  93  12-04                RADIOLOGY & ADDITIONAL TESTS:  Results Reviewed:   Imaging Personally Reviewed:  Electrocardiogram Personally Reviewed:    COORDINATION OF CARE:  Care Discussed with Consultants/Other Providers [Y/N]:  Prior or Outpatient Records Reviewed [Y/N]:   PROGRESS NOTE:   Authored by Melony Chadwick MD  Internal Medicine  Pager [facility] [phone]  Pager [phone]    Patient is a 70y old  Female who presents with a chief complaint of AMS (29 Nov 2021 15:32)      SUBJECTIVE / OVERNIGHT EVENTS: NAEO. Patient seen and examined at bedside. patient remains agitated. per nurse and PCA, patient remains intrusive to neighbors. Hemorrhoids and foot rash are improved.    MEDICATIONS  (STANDING):  ammonium lactate   12% Cream 1 Application(s) Topical two times a day  benztropine 0.25 milliGRAM(s) Oral two times a day  carbidopa/levodopa  25/100 1 Tablet(s) Oral three times a day  clotrimazole 1% Cream 1 Application(s) Topical two times a day  diVALproex  milliGRAM(s) Oral <User Schedule>  diVALproex Sprinkle 750 milliGRAM(s) Oral at bedtime  enoxaparin Injectable 40 milliGRAM(s) SubCutaneous daily  hydrocortisone 1% Cream 1 Application(s) Topical every 12 hours  influenza  Vaccine (HIGH DOSE) 0.7 milliLiter(s) IntraMuscular once  lactulose Syrup 10 Gram(s) Oral at bedtime  levothyroxine 75 MICROGram(s) Oral daily  lisinopril 10 milliGRAM(s) Oral daily  LORazepam     Tablet 1 milliGRAM(s) Oral <User Schedule>  metoprolol tartrate 25 milliGRAM(s) Oral two times a day  OLANZapine 5 milliGRAM(s) Oral three times a day  petrolatum white Ointment 1 Application(s) Topical two times a day  polyethylene glycol 3350 17 Gram(s) Oral daily  senna 1 Tablet(s) Oral two times a day    MEDICATIONS  (PRN):  OLANZapine Injectable 2.5 milliGRAM(s) IntraMuscular every 6 hours PRN agitation  QUEtiapine 50 milliGRAM(s) Oral every 4 hours PRN agitation      CAPILLARY BLOOD GLUCOSE      POCT Blood Glucose.: 130 mg/dL (05 Dec 2021 22:15)  POCT Blood Glucose.: 95 mg/dL (05 Dec 2021 16:35)  POCT Blood Glucose.: 112 mg/dL (05 Dec 2021 11:37)  POCT Blood Glucose.: 103 mg/dL (05 Dec 2021 07:34)    I&O's Summary      PHYSICAL EXAM:  Vital Signs Last 24 Hrs  T(C): 36.7 (05 Dec 2021 22:21), Max: 36.8 (05 Dec 2021 13:54)  T(F): 98 (05 Dec 2021 22:21), Max: 98.2 (05 Dec 2021 13:54)  HR: 97 (05 Dec 2021 22:21) (93 - 97)  BP: 127/67 (05 Dec 2021 22:21) (107/50 - 127/67)  BP(mean): --  RR: 16 (05 Dec 2021 22:21) (16 - 17)  SpO2: 100% (05 Dec 2021 22:21) (100% - 100%)  CONSTITUTIONAL: cachetic, pacing around room, disheveled. wearing only sweatshirt no pants. yelling  EYES: No conjunctival or scleral injection, non-icteric;   ENMT: No external nasal lesions  NECK: Trachea midline without visible neck mass; thyroid not enlarged and non-tender  RESPIRATORY: Breathing comfortably; no audible wheezes. yelling  CARDIOVASCULAR: no lower extremity edema, walking around room  GASTROINTESTINAL:  no rebound/guarding; nondistended, having bowel movements  SKIN: No rashes or ulcers noted  NEUROLOGIC: moves all extremities spontaneously, no ataxia, able to walk appropriately with coordinated movements  PSYCHIATRIC: agitated, paranoid, intrusive  LABS:                        10.4   7.16  )-----------( 250      ( 04 Dec 2021 16:37 )             33.3     12-04    136  |  103  |  28<H>  ----------------------------<  153<H>  4.6   |  22  |  0.90    Ca    9.5      04 Dec 2021 16:37  Phos  4.4     12-04  Mg     2.00     12-04    TPro  7.2  /  Alb  3.6  /  TBili  <0.2  /  DBili  x   /  AST  18  /  ALT  16  /  AlkPhos  93  12-04                RADIOLOGY & ADDITIONAL TESTS:  Results Reviewed:   Imaging Personally Reviewed:  Electrocardiogram Personally Reviewed:    COORDINATION OF CARE:  Care Discussed with Consultants/Other Providers [Y/N]:  Prior or Outpatient Records Reviewed [Y/N]:

## 2021-12-06 NOTE — PROGRESS NOTE ADULT - PROBLEM SELECTOR PLAN 3
scaly approx 3 cm rash on dorsal surface of foot with two <1 cm lesions, skin broken, hyperkeratin on perimeter of rash    - ammonium lactate cream not helping  - trial clotrimazole cream  - consider derm consult scaly approx 3 cm rash on dorsal surface of foot with two <1 cm lesions, skin broken, hyperkeratin on perimeter of rash. now improving per nurse    - ammonium lactate cream not helping  - trial clotrimazole cream  - consider derm consult

## 2021-12-06 NOTE — BH CONSULTATION LIAISON PROGRESS NOTE - NSBHASSESSMENTFT_PSY_ALL_CORE
Pt is a 69 y/o AAF, domiciled at [facility], long hx of Schizophrenia with multiple past inpatient hospitalizations, last hospitalization started at [facility] 10/13/21, past hx of aggression towards staff and peers, no documented hx of SA, pertinent PMH includes HLD, HTN, Parkinson's dx. Seizure d/o, T2DM, Hypothyroidism, Glaucoma, PVD, Asthma, Bladder Ca. Pt was found unconscious on the floor at [facility] on 11/20/21, BIBEMS from [facility] for acute change in mental status.     Pt is currently being medically managed by the medicine and neurology team for AMS episode. CT head, CTA head and neck and CT perfusion has been done, grossly negative. R/o infectious and metabolic causes of AMS. Pt care was discussed with Dr. Chung at [facility] on 11/22, patient has been manic, labile, tearful and hyperreligious, consistent with history of schizophrenia, currently decompensated. Pt currently lacks insight, she is paranoid with extremely disorganized thoughts making her a harm to herself and others and requires continued inpatient hospitalization for monitoring and medication reconciliation. Pt has a history of aggression. She is unpredictable and impulsive. Although cooperative on prior exams, history of agitation and aggression warrants continued low threshold for PRN and restraints. Current PRN Zyprexa 2.5mg q6hrs, with additional dose if pt lacks response. Rx was restarted on psychiatric medications due to concern for psychiatric decompensation.     12/4- patient remains notably disorganized, wandering, with unclear speech, intermittently with irritability/agitation requiring PRN  12/5 - patient irritable, refuses to engage with psychiatric interview, intermittent agitation requiring PRN  12/6 - patient still psychotic and manic, no AH/VH but still paranoid, needs psych admission, no longer notably delirious AOx3    Plan:  - Consider Enhanced Supervision given improvements in behavior; now transferred to 7S  	- LOW threshold for PRN usage for agitation due to history of aggression  	- Consider restraints if agitation escalates or aggression  - Continue Zyprexa 5mg TID  - Continue Benztropine 0.25mg BID  - Continue Depakote 750mg qHS for seizures and Depakote 500mg in AM. Would defer further titration if needed as per primary/neuro team  - If patient agitated and QTc < 500, can use Zyprexa 2.5mg PO/IM q6h PRN per agitation regimen at [facility]. If open to PO c/w current dose of Seroquel 50mg PO Q 4 PRN for agitation  - Neuro note appreciated, keep sinemet at current dose  - PT - walking around well  - Patient needs inpatient psychiatric admission, 2PC completed, in pt chart: going [facility] on Monday Dec 6 per [facility]  - Cl Psych to continue to follow

## 2021-12-06 NOTE — BH CONSULTATION LIAISON PROGRESS NOTE - NSBHCHARTREVIEWVS_PSY_A_CORE FT
Vital Signs Last 24 Hrs  T(C): 36.8 (06 Dec 2021 06:47), Max: 36.8 (05 Dec 2021 13:54)  T(F): 98.3 (06 Dec 2021 06:47), Max: 98.3 (06 Dec 2021 06:47)  HR: 94 (06 Dec 2021 06:47) (94 - 97)  BP: 131/71 (06 Dec 2021 06:47) (107/50 - 131/71)  BP(mean): --  RR: 16 (06 Dec 2021 06:47) (16 - 17)  SpO2: 100% (06 Dec 2021 06:47) (100% - 100%)

## 2021-12-06 NOTE — PROGRESS NOTE ADULT - PROBLEM SELECTOR PLAN 1
Transferred from [facility]. Decompensated Schizophrenia. w/ disorganized thought process, paranoia, Taoism preoccupations    -psych rec continuing Seroquel 50 mg q4 PRN, Ativan 1mg PRN (for severe agitation) and Zyprexa 2.5 mg IM q6 PRN for agitation and holding Ativan if not having any effect due to deliriogenic properties that may be worsening agitation. No Haldol due to Parkinson's disease  - agitated at times, now calm and cooperative   - per psych discussion with Dr. Chung at [facility], patient has been manic, labile, tearful and hyperreligious while at [facility]  - continue depakote 750mg qhs and Cogentin 0.5mg BID  - c/w Risperdal 1.5mg BID    - c/w Ativan 1 mg PO BID per psych to avoid withdrawal  - decrease cogentin to 0.25mg BID  - psych rec continuing to hold trazodone and gabapentin due to recent AMS and to avoid polypharmacy    - pt will need inpatient psychiatric treatment for further stabilization per psych. going [facility] on Monday Dec 6 per [facility]

## 2021-12-06 NOTE — PROGRESS NOTE ADULT - PROBLEM SELECTOR PLAN 2
hx of hemorrhoids, per bedside nurse, patient has symptomatic hemorrhoids and painful bowel movements  - senna and miralax  - hemorrhoid cream hx of hemorrhoids, per bedside nurse, patient has symptomatic hemorrhoids and painful bowel movements. now improving with regular bowel movements per nurse  - senna and miralax  - hemorrhoid cream

## 2021-12-06 NOTE — PROGRESS NOTE ADULT - ASSESSMENT
71 yo F with PMH of HTN, T2DM, asthma, seizure disorder, Parkinson's disease, glaucoma, schizophrenia, hemorrhoids BIBEMS from [facility] for acute change in mental status i/s/o hypotension. Likely medication-induced given negative brain imaging and negative metabolic and infectious work up, now with decompensated schizophrenia. Waiting for acceptance by inpatient psych facility, going [facility] on Monday Dec 6 per [facility]

## 2021-12-06 NOTE — PROGRESS NOTE ADULT - ATTENDING COMMENTS
70W PMH of HTN, T2DM, asthma, Parkinson's disease, seizure disorder, schizophrenia BIBEMS from [facility] for acute change in mental status and episode of hypotension. Remains medically stable now. Psychiatrically needs further improvement. Now needs to be discharged to psych facility for continued treatment.     # Schizophrenia - remains decompensated and will need inpatient psych facility on discharge.  Psych f/u appreciated, c/w current regimen , LOW threshold for PRN usage for agitation due to history of aggression and can Consider restraints if agitation escalates or aggression as per psych, c/w Enhanced supervision     Dispo: [facility] today    time spent 40 mins

## 2021-12-06 NOTE — CHART NOTE - NSCHARTNOTEFT_GEN_A_CORE
Primary team asked if patient's current parkinson's meds would be a cause to patient's agitation.    Patient currently on carbidopa/levodopa 25/100 TID, low dose sinemet, low suspicion of being culprit to patient's agitation.     If patient develops lora/disruptive hallucinations, may have contribution from sinemet, but per primary team, not hallucinations.     patient will require psychiatric med optimization for agitation.    d/w primary team

## 2021-12-06 NOTE — PROGRESS NOTE ADULT - PROBLEM SELECTOR PLAN 7
-Diet: passed dysphagia screen, however slow to swallow per RN. will start pureed diet  -VTE ppx: lovenox 40 mg subQ daily  -Dispo:Pt is medically optimized for discharge. Pending acceptance by inpatient psych facility. May be considered by [facility], will send covid swab, CBC, CMP -Diet: passed dysphagia screen, however slow to swallow per RN. will start pureed diet  -VTE ppx: lovenox 40 mg subQ daily  -Dispo:Pt is medically optimized for discharge. Pending acceptance by inpatient psych facility. May be considered by [facility], sent covid swab, cbc, cmp. Per , patient will go to [facility] today 12/6.

## 2021-12-06 NOTE — BH CONSULTATION LIAISON PROGRESS NOTE - NSBHFUPINTERVALHXFT_PSY_A_CORE
Patient AOx3, still extremely tangential, hyperverbal, difficult to redirect, errantly wandering, singing at times, less aggressive but still intermittently getting PRNS

## 2021-12-07 ENCOUNTER — INPATIENT (INPATIENT)
Facility: HOSPITAL | Age: 71
LOS: 9 days | Discharge: OTHER INSTITUTION | DRG: 885 | End: 2021-12-17
Attending: PSYCHIATRY & NEUROLOGY | Admitting: PSYCHIATRY & NEUROLOGY
Payer: MEDICARE

## 2021-12-07 VITALS
OXYGEN SATURATION: 97 % | DIASTOLIC BLOOD PRESSURE: 78 MMHG | RESPIRATION RATE: 20 BRPM | HEART RATE: 90 BPM | SYSTOLIC BLOOD PRESSURE: 186 MMHG | TEMPERATURE: 98 F

## 2021-12-07 VITALS
OXYGEN SATURATION: 98 % | TEMPERATURE: 98 F | DIASTOLIC BLOOD PRESSURE: 79 MMHG | RESPIRATION RATE: 17 BRPM | SYSTOLIC BLOOD PRESSURE: 139 MMHG | HEART RATE: 85 BPM

## 2021-12-07 LAB
GLUCOSE BLDC GLUCOMTR-MCNC: 102 MG/DL — HIGH (ref 70–99)
GLUCOSE BLDC GLUCOMTR-MCNC: 85 MG/DL — SIGNIFICANT CHANGE UP (ref 70–99)
GLUCOSE BLDC GLUCOMTR-MCNC: 92 MG/DL — SIGNIFICANT CHANGE UP (ref 70–99)
SARS-COV-2 RNA SPEC QL NAA+PROBE: SIGNIFICANT CHANGE UP

## 2021-12-07 PROCEDURE — 99231 SBSQ HOSP IP/OBS SF/LOW 25: CPT | Mod: GC

## 2021-12-07 PROCEDURE — 99233 SBSQ HOSP IP/OBS HIGH 50: CPT

## 2021-12-07 RX ORDER — OLANZAPINE 15 MG/1
2.5 TABLET, FILM COATED ORAL
Qty: 0 | Refills: 0 | DISCHARGE
Start: 2021-12-07

## 2021-12-07 RX ORDER — BENZTROPINE MESYLATE 1 MG
0.25 TABLET ORAL
Refills: 0 | Status: DISCONTINUED | OUTPATIENT
Start: 2021-12-07 | End: 2021-12-08

## 2021-12-07 RX ORDER — POLYETHYLENE GLYCOL 3350 17 G/17G
17 POWDER, FOR SOLUTION ORAL DAILY
Refills: 0 | Status: DISCONTINUED | OUTPATIENT
Start: 2021-12-07 | End: 2021-12-17

## 2021-12-07 RX ORDER — CARBIDOPA AND LEVODOPA 25; 100 MG/1; MG/1
1 TABLET ORAL THREE TIMES A DAY
Refills: 0 | Status: DISCONTINUED | OUTPATIENT
Start: 2021-12-07 | End: 2021-12-17

## 2021-12-07 RX ORDER — DIVALPROEX SODIUM 500 MG/1
1 TABLET, DELAYED RELEASE ORAL
Qty: 0 | Refills: 0 | DISCHARGE
Start: 2021-12-07

## 2021-12-07 RX ORDER — QUETIAPINE FUMARATE 200 MG/1
50 TABLET, FILM COATED ORAL
Refills: 0 | Status: DISCONTINUED | OUTPATIENT
Start: 2021-12-07 | End: 2021-12-17

## 2021-12-07 RX ORDER — OLANZAPINE 15 MG/1
1 TABLET, FILM COATED ORAL
Qty: 0 | Refills: 0 | DISCHARGE
Start: 2021-12-07

## 2021-12-07 RX ORDER — RISPERIDONE 4 MG/1
1 TABLET ORAL THREE TIMES A DAY
Refills: 0 | Status: DISCONTINUED | OUTPATIENT
Start: 2021-12-07 | End: 2021-12-07

## 2021-12-07 RX ORDER — BENZTROPINE MESYLATE 1 MG
0.25 TABLET ORAL
Qty: 0 | Refills: 0 | DISCHARGE
Start: 2021-12-07

## 2021-12-07 RX ORDER — DIVALPROEX SODIUM 500 MG/1
6 TABLET, DELAYED RELEASE ORAL
Qty: 0 | Refills: 0 | DISCHARGE
Start: 2021-12-07

## 2021-12-07 RX ORDER — SENNA PLUS 8.6 MG/1
2 TABLET ORAL AT BEDTIME
Refills: 0 | Status: DISCONTINUED | OUTPATIENT
Start: 2021-12-07 | End: 2021-12-17

## 2021-12-07 RX ORDER — RISPERIDONE 4 MG/1
1.5 TABLET ORAL
Refills: 0 | Status: DISCONTINUED | OUTPATIENT
Start: 2021-12-07 | End: 2021-12-08

## 2021-12-07 RX ORDER — DIVALPROEX SODIUM 500 MG/1
750 TABLET, DELAYED RELEASE ORAL AT BEDTIME
Refills: 0 | Status: DISCONTINUED | OUTPATIENT
Start: 2021-12-07 | End: 2021-12-17

## 2021-12-07 RX ORDER — QUETIAPINE FUMARATE 200 MG/1
1 TABLET, FILM COATED ORAL
Qty: 0 | Refills: 0 | DISCHARGE
Start: 2021-12-07

## 2021-12-07 RX ORDER — LEVOTHYROXINE SODIUM 125 MCG
75 TABLET ORAL DAILY
Refills: 0 | Status: DISCONTINUED | OUTPATIENT
Start: 2021-12-07 | End: 2021-12-17

## 2021-12-07 RX ORDER — LISINOPRIL 2.5 MG/1
10 TABLET ORAL DAILY
Refills: 0 | Status: DISCONTINUED | OUTPATIENT
Start: 2021-12-07 | End: 2021-12-17

## 2021-12-07 RX ORDER — POLYETHYLENE GLYCOL 3350 17 G/17G
17 POWDER, FOR SOLUTION ORAL
Qty: 0 | Refills: 0 | DISCHARGE
Start: 2021-12-07

## 2021-12-07 RX ORDER — METOPROLOL TARTRATE 50 MG
25 TABLET ORAL
Refills: 0 | Status: DISCONTINUED | OUTPATIENT
Start: 2021-12-07 | End: 2021-12-17

## 2021-12-07 RX ADMIN — Medication 0.25 MILLIGRAM(S): at 22:59

## 2021-12-07 RX ADMIN — Medication 1 APPLICATION(S): at 06:43

## 2021-12-07 RX ADMIN — Medication 25 MILLIGRAM(S): at 06:41

## 2021-12-07 RX ADMIN — OLANZAPINE 2.5 MILLIGRAM(S): 15 TABLET, FILM COATED ORAL at 09:30

## 2021-12-07 RX ADMIN — Medication 0.25 MILLIGRAM(S): at 06:42

## 2021-12-07 RX ADMIN — DIVALPROEX SODIUM 750 MILLIGRAM(S): 500 TABLET, DELAYED RELEASE ORAL at 22:46

## 2021-12-07 RX ADMIN — Medication 75 MICROGRAM(S): at 06:41

## 2021-12-07 RX ADMIN — LISINOPRIL 10 MILLIGRAM(S): 2.5 TABLET ORAL at 06:41

## 2021-12-07 RX ADMIN — ENOXAPARIN SODIUM 40 MILLIGRAM(S): 100 INJECTION SUBCUTANEOUS at 11:57

## 2021-12-07 RX ADMIN — OLANZAPINE 5 MILLIGRAM(S): 15 TABLET, FILM COATED ORAL at 15:01

## 2021-12-07 RX ADMIN — CARBIDOPA AND LEVODOPA 1 TABLET(S): 25; 100 TABLET ORAL at 22:47

## 2021-12-07 RX ADMIN — CARBIDOPA AND LEVODOPA 1 TABLET(S): 25; 100 TABLET ORAL at 06:41

## 2021-12-07 RX ADMIN — Medication 25 MILLIGRAM(S): at 22:45

## 2021-12-07 RX ADMIN — Medication 1 APPLICATION(S): at 06:42

## 2021-12-07 RX ADMIN — Medication 1 APPLICATION(S): at 23:00

## 2021-12-07 RX ADMIN — CARBIDOPA AND LEVODOPA 1 TABLET(S): 25; 100 TABLET ORAL at 15:01

## 2021-12-07 RX ADMIN — SENNA PLUS 1 TABLET(S): 8.6 TABLET ORAL at 06:41

## 2021-12-07 RX ADMIN — DIVALPROEX SODIUM 500 MILLIGRAM(S): 500 TABLET, DELAYED RELEASE ORAL at 06:42

## 2021-12-07 RX ADMIN — Medication 0.25 MILLIGRAM(S): at 16:51

## 2021-12-07 RX ADMIN — SENNA PLUS 2 TABLET(S): 8.6 TABLET ORAL at 22:45

## 2021-12-07 RX ADMIN — RISPERIDONE 1.5 MILLIGRAM(S): 4 TABLET ORAL at 22:44

## 2021-12-07 RX ADMIN — OLANZAPINE 5 MILLIGRAM(S): 15 TABLET, FILM COATED ORAL at 06:42

## 2021-12-07 NOTE — BH PATIENT PROFILE - HOME MEDICATIONS
LORazepam 1 mg oral tablet , 1 tab(s) orally every 12 hours  QUEtiapine 50 mg oral tablet , 1 tab(s) orally every 4 hours, As needed, agitation  OLANZapine 10 mg intramuscular injection , 2.5 milligram(s) intramuscular every 6 hours, As needed, agitation  OLANZapine 5 mg oral tablet , 1 tab(s) orally 3 times a day  benztropine , 0.25 milligram(s) orally 2 times a day  divalproex sodium 125 mg oral delayed release capsule , 6 cap(s) orally once a day (at bedtime)  divalproex sodium 500 mg oral delayed release tablet , 1 tab(s) orally once a day (in the morning)  clotrimazole 1% topical cream , 1 application topically 2 times a day  polyethylene glycol 3350 oral powder for reconstitution , 17 gram(s) orally once a day  ammonium lactate 12% topical cream , 1 application topically 2 times a day  levothyroxine 75 mcg (0.075 mg) oral tablet , 1 tab(s) orally once a day  senna oral tablet , 1 tab(s) orally 2 times a day  lactulose 10 g/15 mL oral syrup , 15 milliliter(s) orally once a day (at bedtime)  hydrocortisone 1% topical cream , 1 application topically every 12 hours for active hemorrhoids  metoprolol tartrate 25 mg oral tablet , 1 tab(s) orally 2 times a day  risperiDONE 0.5 mg oral tablet , 3 tab(s) orally 2 times a day  carbidopa-levodopa 25 mg-100 mg oral tablet , 1 tab(s) orally 3 times a day  lisinopril 10 mg oral tablet , 1 tab(s) orally once a day

## 2021-12-07 NOTE — PROGRESS NOTE ADULT - PROBLEM SELECTOR PLAN 2
hx of hemorrhoids, per bedside nurse, patient has symptomatic hemorrhoids and painful bowel movements. now improving with regular bowel movements per nurse  - senna and miralax  - hemorrhoid cream

## 2021-12-07 NOTE — BH PATIENT PROFILE - ARE SIGNIFICANT INDICATORS COMPLETE.
HEARING AID CHECK    Audiology:  Hearing aid make/model: Roxana Goff  T   Serial Number/s: right: [id number] left: [id number]  Battery: 312  Speaker: #2 Standard   Earmold/Dome: Large closed   Dispense Date: 12/1/2017 Service Package: T-19 2/24/2021  Filters: Cerustop    SUBJECTIVE:  Reason for visit: Routine 4 month hearing aid check.   Patient indicated both hearing aids are not working.     OBJECTIVE:  Services provided:   Otoscopy:  minimal cerumen right and cerumen impaction left.  Dispensed supply: Domes and filters   Dispensed batteries: Size: 312  Number of batteries: 12  Cleaned Hearing Aid Parts: casing, microphone, , battery contacts and volume control  Replaced Hearing Aid Parts: wax filter, dome, retention line  Listening Check: both impressions: Good overall sound quality and volume after cleaning and battery change.   Programming:  Both: No changes were made.   Other: Upon inspection it was noted that both hearing aid batteries were dead. Patient indicated they were new batteries. He was counseled that perhaps he got a bad package of batteries. Patient was encouraged to utilize the new batteries he was provided today. He expressesd understanding.      ASSESSMENT:  Continued use of the hearing aids.     FOLLOW-UP:  Hearing aid check to be scheduled in 4 months or sooner if concerns arise.   
Yes

## 2021-12-07 NOTE — BH CONSULTATION LIAISON PROGRESS NOTE - NSBHCONSFOLLOWNEEDS_PSY_ALL_CORE
Needs further psych safety assessment prior to discharge

## 2021-12-07 NOTE — BH CONSULTATION LIAISON PROGRESS NOTE - NSBHPTASSESSDT_PSY_A_CORE
01-Dec-2021 11:39
04-Dec-2021 11:03
06-Dec-2021 11:05
30-Nov-2021 11:03
07-Dec-2021 12:37
29-Nov-2021 11:09
03-Dec-2021 11:12
05-Dec-2021 09:48
26-Nov-2021 11:50
02-Dec-2021 14:03
23-Nov-2021 11:38
25-Nov-2021 12:06
24-Nov-2021 11:23

## 2021-12-07 NOTE — BH CONSULTATION LIAISON PROGRESS NOTE - NSBHCONSULTPRIMARYDISCUSSYES_PSY_A_CORE FT
[facility] transfer  renewed 2PC complete [facility] transfer  renewed 2PC complete  left message with sister Natali lo, [phone] with [facility] unit # [phone]

## 2021-12-07 NOTE — BH CONSULTATION LIAISON PROGRESS NOTE - NSBHFUPINTERVALCCFT_PSY_A_CORE
"I want to talk to the doctor"
"I see and hear things"
"I'm hungry."
I sing well my whole family does"
"don't use my Purell"
I sing well my whole family does"
"I am hungry"
"I'm hungry."
psychosis
agitation
"Get me out of these things [restraints]!"
"I want to walk more"

## 2021-12-07 NOTE — BH CONSULTATION LIAISON PROGRESS NOTE - NSBHCHARTREVIEWVS_PSY_A_CORE FT
Vital Signs Last 24 Hrs  T(C): 36.7 (07 Dec 2021 10:52), Max: 37 (07 Dec 2021 06:57)  T(F): 98.1 (07 Dec 2021 10:52), Max: 98.6 (07 Dec 2021 06:57)  HR: 85 (07 Dec 2021 10:52) (73 - 93)  BP: 139/79 (07 Dec 2021 10:52) (118/70 - 171/97)  BP(mean): --  RR: 17 (07 Dec 2021 10:52) (16 - 17)  SpO2: 98% (07 Dec 2021 10:52) (98% - 100%)

## 2021-12-07 NOTE — PROGRESS NOTE ADULT - PROBLEM SELECTOR PLAN 7
-Diet: passed dysphagia screen, however slow to swallow per RN. will start pureed diet  -VTE ppx: lovenox 40 mg subQ daily  -Dispo:Pt is medically optimized for discharge. Pending acceptance by inpatient psych facility. May be considered by [facility], sent covid swab, cbc, cmp. Per , patient will go to [facility] today 12/6. -Diet: passed dysphagia screen, however slow to swallow per RN. will start pureed diet  -VTE ppx: lovenox 40 mg subQ daily  -Dispo:Pt is medically optimized for discharge. Pending acceptance by inpatient psych facility. May be considered by [facility], sent covid swab, cbc, cmp. Per , patient will go to [facility] 12/6, don't have bed yet. going [facility] 12/7

## 2021-12-07 NOTE — BH PATIENT PROFILE - INFORMATION PROVIDED TO:
patient requires reinforcement as she is disorganized at present. dry serous drainage soiled cling dressing on left foot on arrival.upon removal scaly rash noted and dry slightly bloody area noted under foot. provider informed/patient

## 2021-12-07 NOTE — BH CONSULTATION LIAISON PROGRESS NOTE - NSBHFUPINTERVALHXFT_PSY_A_CORE
patient tangential, intrusive, following writer around and singing at times, hyperreligious, grandiose

## 2021-12-07 NOTE — BH CONSULTATION LIAISON PROGRESS NOTE - MSE OPTIONS
Unstructured MSE
Structured MSE
Unstructured MSE

## 2021-12-07 NOTE — BH CONSULTATION LIAISON PROGRESS NOTE - CURRENT MEDICATION
MEDICATIONS  (STANDING):  ammonium lactate   12% Cream 1 Application(s) Topical two times a day  benztropine 0.25 milliGRAM(s) Oral two times a day  carbidopa/levodopa  25/100 1 Tablet(s) Oral three times a day  clotrimazole 1% Cream 1 Application(s) Topical two times a day  diVALproex  milliGRAM(s) Oral <User Schedule>  diVALproex Sprinkle 750 milliGRAM(s) Oral at bedtime  enoxaparin Injectable 40 milliGRAM(s) SubCutaneous daily  hydrocortisone 1% Cream 1 Application(s) Topical every 12 hours  influenza  Vaccine (HIGH DOSE) 0.7 milliLiter(s) IntraMuscular once  lactulose Syrup 10 Gram(s) Oral at bedtime  levothyroxine 75 MICROGram(s) Oral daily  lisinopril 10 milliGRAM(s) Oral daily  metoprolol tartrate 25 milliGRAM(s) Oral two times a day  OLANZapine 5 milliGRAM(s) Oral three times a day  petrolatum white Ointment 1 Application(s) Topical two times a day  polyethylene glycol 3350 17 Gram(s) Oral daily  senna 1 Tablet(s) Oral two times a day    MEDICATIONS  (PRN):  OLANZapine Injectable 2.5 milliGRAM(s) IntraMuscular every 6 hours PRN agitation  QUEtiapine 50 milliGRAM(s) Oral every 4 hours PRN agitation  
MEDICATIONS  (STANDING):  ammonium lactate   12% Cream 1 Application(s) Topical two times a day  benztropine 0.25 milliGRAM(s) Oral two times a day  carbidopa/levodopa  25/100 1 Tablet(s) Oral three times a day  clotrimazole 1% Cream 1 Application(s) Topical two times a day  diVALproex  milliGRAM(s) Oral <User Schedule>  diVALproex Sprinkle 750 milliGRAM(s) Oral at bedtime  enoxaparin Injectable 40 milliGRAM(s) SubCutaneous daily  influenza  Vaccine (HIGH DOSE) 0.7 milliLiter(s) IntraMuscular once  lactulose Syrup 10 Gram(s) Oral at bedtime  levothyroxine 75 MICROGram(s) Oral daily  lisinopril 10 milliGRAM(s) Oral daily  LORazepam     Tablet 1 milliGRAM(s) Oral <User Schedule>  metoprolol tartrate 25 milliGRAM(s) Oral two times a day  OLANZapine 5 milliGRAM(s) Oral three times a day  petrolatum white Ointment 1 Application(s) Topical two times a day  polyethylene glycol 3350 17 Gram(s) Oral daily  senna 1 Tablet(s) Oral two times a day    MEDICATIONS  (PRN):  OLANZapine Injectable 2.5 milliGRAM(s) IntraMuscular every 6 hours PRN agitation  QUEtiapine 50 milliGRAM(s) Oral every 6 hours PRN agitation  
MEDICATIONS  (STANDING):  ammonium lactate   12% Cream 1 Application(s) Topical two times a day  benztropine 0.25 milliGRAM(s) Oral two times a day  carbidopa/levodopa  25/100 1 Tablet(s) Oral three times a day  clotrimazole 1% Cream 1 Application(s) Topical two times a day  diVALproex  milliGRAM(s) Oral <User Schedule>  diVALproex Sprinkle 750 milliGRAM(s) Oral at bedtime  enoxaparin Injectable 40 milliGRAM(s) SubCutaneous daily  hydrocortisone 1% Cream 1 Application(s) Topical every 12 hours  influenza  Vaccine (HIGH DOSE) 0.7 milliLiter(s) IntraMuscular once  lactulose Syrup 10 Gram(s) Oral at bedtime  levothyroxine 75 MICROGram(s) Oral daily  lisinopril 10 milliGRAM(s) Oral daily  LORazepam     Tablet 1 milliGRAM(s) Oral <User Schedule>  metoprolol tartrate 25 milliGRAM(s) Oral two times a day  OLANZapine 5 milliGRAM(s) Oral three times a day  petrolatum white Ointment 1 Application(s) Topical two times a day  polyethylene glycol 3350 17 Gram(s) Oral daily  senna 1 Tablet(s) Oral two times a day    MEDICATIONS  (PRN):  OLANZapine Injectable 2.5 milliGRAM(s) IntraMuscular every 6 hours PRN agitation  QUEtiapine 50 milliGRAM(s) Oral every 6 hours PRN agitation  
MEDICATIONS  (STANDING):  ammonium lactate   12% Cream 1 Application(s) Topical two times a day  benztropine 0.25 milliGRAM(s) Oral two times a day  carbidopa/levodopa  25/100 1 Tablet(s) Oral three times a day  clotrimazole 1% Cream 1 Application(s) Topical two times a day  diVALproex  milliGRAM(s) Oral <User Schedule>  diVALproex Sprinkle 750 milliGRAM(s) Oral at bedtime  enoxaparin Injectable 40 milliGRAM(s) SubCutaneous daily  hydrocortisone 1% Cream 1 Application(s) Topical every 12 hours  influenza  Vaccine (HIGH DOSE) 0.7 milliLiter(s) IntraMuscular once  lactulose Syrup 10 Gram(s) Oral at bedtime  levothyroxine 75 MICROGram(s) Oral daily  lisinopril 10 milliGRAM(s) Oral daily  LORazepam     Tablet 1 milliGRAM(s) Oral <User Schedule>  metoprolol tartrate 25 milliGRAM(s) Oral two times a day  OLANZapine 5 milliGRAM(s) Oral three times a day  petrolatum white Ointment 1 Application(s) Topical two times a day  senna 1 Tablet(s) Oral two times a day    MEDICATIONS  (PRN):  OLANZapine Injectable 2.5 milliGRAM(s) IntraMuscular every 6 hours PRN agitation  QUEtiapine 50 milliGRAM(s) Oral every 6 hours PRN agitation  
MEDICATIONS  (STANDING):  ammonium lactate   12% Cream 1 Application(s) Topical two times a day  benztropine 0.25 milliGRAM(s) Oral two times a day  carbidopa/levodopa  25/100 1 Tablet(s) Oral three times a day  diVALproex Sprinkle 750 milliGRAM(s) Oral at bedtime  enoxaparin Injectable 40 milliGRAM(s) SubCutaneous daily  hydrocortisone 1% Cream 1 Application(s) Topical every 12 hours  influenza  Vaccine (HIGH DOSE) 0.7 milliLiter(s) IntraMuscular once  lactulose Syrup 10 Gram(s) Oral at bedtime  levothyroxine 75 MICROGram(s) Oral daily  lisinopril 10 milliGRAM(s) Oral daily  LORazepam     Tablet 1 milliGRAM(s) Oral <User Schedule>  metoprolol tartrate 25 milliGRAM(s) Oral two times a day  petrolatum white Ointment 1 Application(s) Topical two times a day  risperiDONE   Tablet 1.5 milliGRAM(s) Oral two times a day  senna 1 Tablet(s) Oral two times a day    MEDICATIONS  (PRN):  OLANZapine 2.5 milliGRAM(s) Oral once PRN severe breakthrough agitation  OLANZapine Injectable 5 milliGRAM(s) IntraMuscular every 6 hours PRN agitation  QUEtiapine 50 milliGRAM(s) Oral every 6 hours PRN agitation  
MEDICATIONS  (STANDING):  ammonium lactate   12% Cream 1 Application(s) Topical two times a day  benztropine 0.25 milliGRAM(s) Oral two times a day  carbidopa/levodopa  25/100 1 Tablet(s) Oral three times a day  clotrimazole 1% Cream 1 Application(s) Topical two times a day  diVALproex  milliGRAM(s) Oral <User Schedule>  diVALproex Sprinkle 750 milliGRAM(s) Oral at bedtime  enoxaparin Injectable 40 milliGRAM(s) SubCutaneous daily  hydrocortisone 1% Cream 1 Application(s) Topical every 12 hours  influenza  Vaccine (HIGH DOSE) 0.7 milliLiter(s) IntraMuscular once  lactulose Syrup 10 Gram(s) Oral at bedtime  levothyroxine 75 MICROGram(s) Oral daily  lisinopril 10 milliGRAM(s) Oral daily  LORazepam     Tablet 1 milliGRAM(s) Oral <User Schedule>  metoprolol tartrate 25 milliGRAM(s) Oral two times a day  OLANZapine 5 milliGRAM(s) Oral three times a day  petrolatum white Ointment 1 Application(s) Topical two times a day  polyethylene glycol 3350 17 Gram(s) Oral daily  senna 1 Tablet(s) Oral two times a day    MEDICATIONS  (PRN):  OLANZapine Injectable 2.5 milliGRAM(s) IntraMuscular every 6 hours PRN agitation  QUEtiapine 50 milliGRAM(s) Oral every 4 hours PRN agitation  
MEDICATIONS  (STANDING):  ammonium lactate   12% Cream 1 Application(s) Topical two times a day  benztropine 0.25 milliGRAM(s) Oral two times a day  carbidopa/levodopa  25/100 1 Tablet(s) Oral three times a day  diVALproex Sprinkle 750 milliGRAM(s) Oral at bedtime  enoxaparin Injectable 40 milliGRAM(s) SubCutaneous daily  hydrocortisone 1% Cream 1 Application(s) Topical every 12 hours  influenza  Vaccine (HIGH DOSE) 0.7 milliLiter(s) IntraMuscular once  lactulose Syrup 10 Gram(s) Oral at bedtime  levothyroxine 75 MICROGram(s) Oral daily  lisinopril 10 milliGRAM(s) Oral daily  LORazepam     Tablet 1 milliGRAM(s) Oral <User Schedule>  metoprolol tartrate 25 milliGRAM(s) Oral two times a day  risperiDONE   Tablet 1.5 milliGRAM(s) Oral two times a day  senna 1 Tablet(s) Oral two times a day    MEDICATIONS  (PRN):  OLANZapine Injectable 5 milliGRAM(s) IntraMuscular every 6 hours PRN agitation  QUEtiapine 50 milliGRAM(s) Oral every 6 hours PRN agitation  
MEDICATIONS  (STANDING):  ammonium lactate   12% Cream 1 Application(s) Topical two times a day  benztropine 0.5 milliGRAM(s) Oral two times a day  carbidopa/levodopa  25/100 1 Tablet(s) Oral three times a day  diVALproex Sprinkle 750 milliGRAM(s) Oral at bedtime  enoxaparin Injectable 40 milliGRAM(s) SubCutaneous daily  influenza  Vaccine (HIGH DOSE) 0.7 milliLiter(s) IntraMuscular once  lactulose Syrup 10 Gram(s) Oral at bedtime  levothyroxine 75 MICROGram(s) Oral daily  lisinopril 10 milliGRAM(s) Oral daily  LORazepam     Tablet 1 milliGRAM(s) Oral two times a day  metoprolol tartrate 25 milliGRAM(s) Oral two times a day  risperiDONE   Tablet 1.5 milliGRAM(s) Oral two times a day  senna 1 Tablet(s) Oral two times a day    MEDICATIONS  (PRN):  dibucaine 1% Ointment 1 Application(s) Topical every 6 hours PRN rectal pain  OLANZapine Injectable 2.5 milliGRAM(s) IntraMuscular every 6 hours PRN agitation  
MEDICATIONS  (STANDING):  ammonium lactate   12% Cream 1 Application(s) Topical two times a day  benztropine 0.25 milliGRAM(s) Oral two times a day  carbidopa/levodopa  25/100 1 Tablet(s) Oral three times a day  diVALproex Sprinkle 750 milliGRAM(s) Oral at bedtime  enoxaparin Injectable 40 milliGRAM(s) SubCutaneous daily  hydrocortisone 1% Cream 1 Application(s) Topical every 12 hours  influenza  Vaccine (HIGH DOSE) 0.7 milliLiter(s) IntraMuscular once  lactulose Syrup 10 Gram(s) Oral at bedtime  levothyroxine 75 MICROGram(s) Oral daily  lisinopril 10 milliGRAM(s) Oral daily  LORazepam     Tablet 1 milliGRAM(s) Oral <User Schedule>  metoprolol tartrate 25 milliGRAM(s) Oral two times a day  petrolatum white Ointment 1 Application(s) Topical two times a day  risperiDONE   Tablet 1.5 milliGRAM(s) Oral two times a day  senna 1 Tablet(s) Oral two times a day    MEDICATIONS  (PRN):  OLANZapine 2.5 milliGRAM(s) Oral once PRN severe breakthrough agitation  OLANZapine Injectable 2.5 milliGRAM(s) IntraMuscular every 6 hours PRN agitation  QUEtiapine 50 milliGRAM(s) Oral every 6 hours PRN agitation  
MEDICATIONS  (STANDING):  ammonium lactate   12% Cream 1 Application(s) Topical two times a day  benztropine 0.25 milliGRAM(s) Oral two times a day  carbidopa/levodopa  25/100 1 Tablet(s) Oral three times a day  clotrimazole 1% Cream 1 Application(s) Topical two times a day  diVALproex Sprinkle 750 milliGRAM(s) Oral at bedtime  enoxaparin Injectable 40 milliGRAM(s) SubCutaneous daily  hydrocortisone 1% Cream 1 Application(s) Topical every 12 hours  influenza  Vaccine (HIGH DOSE) 0.7 milliLiter(s) IntraMuscular once  lactulose Syrup 10 Gram(s) Oral at bedtime  levothyroxine 75 MICROGram(s) Oral daily  lisinopril 10 milliGRAM(s) Oral daily  LORazepam     Tablet 1 milliGRAM(s) Oral <User Schedule>  metoprolol tartrate 25 milliGRAM(s) Oral two times a day  petrolatum white Ointment 1 Application(s) Topical two times a day  risperiDONE   Tablet 1.5 milliGRAM(s) Oral two times a day  senna 1 Tablet(s) Oral two times a day    MEDICATIONS  (PRN):  OLANZapine 2.5 milliGRAM(s) Oral once PRN severe breakthrough agitation  OLANZapine Injectable 2.5 milliGRAM(s) IntraMuscular every 6 hours PRN agitation  QUEtiapine 50 milliGRAM(s) Oral every 6 hours PRN agitation  
MEDICATIONS  (STANDING):  ammonium lactate   12% Cream 1 Application(s) Topical two times a day  benztropine 0.25 milliGRAM(s) Oral two times a day  carbidopa/levodopa  25/100 1 Tablet(s) Oral three times a day  clotrimazole 1% Cream 1 Application(s) Topical two times a day  diVALproex  milliGRAM(s) Oral <User Schedule>  diVALproex Sprinkle 750 milliGRAM(s) Oral at bedtime  enoxaparin Injectable 40 milliGRAM(s) SubCutaneous daily  hydrocortisone 1% Cream 1 Application(s) Topical every 12 hours  influenza  Vaccine (HIGH DOSE) 0.7 milliLiter(s) IntraMuscular once  lactulose Syrup 10 Gram(s) Oral at bedtime  levothyroxine 75 MICROGram(s) Oral daily  lisinopril 10 milliGRAM(s) Oral daily  LORazepam     Tablet 1 milliGRAM(s) Oral <User Schedule>  metoprolol tartrate 25 milliGRAM(s) Oral two times a day  OLANZapine 5 milliGRAM(s) Oral three times a day  petrolatum white Ointment 1 Application(s) Topical two times a day  polyethylene glycol 3350 17 Gram(s) Oral daily  senna 1 Tablet(s) Oral two times a day    MEDICATIONS  (PRN):  OLANZapine Injectable 2.5 milliGRAM(s) IntraMuscular every 6 hours PRN agitation  QUEtiapine 50 milliGRAM(s) Oral every 4 hours PRN agitation  
MEDICATIONS  (STANDING):  ammonium lactate   12% Cream 1 Application(s) Topical two times a day  benztropine 0.5 milliGRAM(s) Oral two times a day  carbidopa/levodopa  25/100 1 Tablet(s) Oral three times a day  diVALproex Sprinkle 750 milliGRAM(s) Oral at bedtime  enoxaparin Injectable 40 milliGRAM(s) SubCutaneous daily  hydrocortisone 1% Cream 1 Application(s) Topical every 12 hours  influenza  Vaccine (HIGH DOSE) 0.7 milliLiter(s) IntraMuscular once  lactulose Syrup 10 Gram(s) Oral at bedtime  levothyroxine 75 MICROGram(s) Oral daily  lisinopril 10 milliGRAM(s) Oral daily  LORazepam     Tablet 1 milliGRAM(s) Oral <User Schedule>  metoprolol tartrate 25 milliGRAM(s) Oral two times a day  risperiDONE   Tablet 1.5 milliGRAM(s) Oral two times a day  senna 1 Tablet(s) Oral two times a day    MEDICATIONS  (PRN):  OLANZapine Injectable 5 milliGRAM(s) IntraMuscular every 6 hours PRN agitation  QUEtiapine 50 milliGRAM(s) Oral every 6 hours PRN agitation  
MEDICATIONS  (STANDING):  ammonium lactate   12% Cream 1 Application(s) Topical two times a day  benztropine 0.5 milliGRAM(s) Oral two times a day  carbidopa/levodopa  25/100 1 Tablet(s) Oral three times a day  diVALproex Sprinkle 750 milliGRAM(s) Oral at bedtime  enoxaparin Injectable 40 milliGRAM(s) SubCutaneous daily  hydrocortisone 1% Cream 1 Application(s) Topical every 12 hours  influenza  Vaccine (HIGH DOSE) 0.7 milliLiter(s) IntraMuscular once  lactulose Syrup 10 Gram(s) Oral at bedtime  levothyroxine 75 MICROGram(s) Oral daily  lisinopril 10 milliGRAM(s) Oral daily  LORazepam     Tablet 1 milliGRAM(s) Oral <User Schedule>  metoprolol tartrate 25 milliGRAM(s) Oral two times a day  risperiDONE   Tablet 1.5 milliGRAM(s) Oral two times a day  senna 1 Tablet(s) Oral two times a day    MEDICATIONS  (PRN):  OLANZapine Injectable 2.5 milliGRAM(s) IntraMuscular every 6 hours PRN agitation

## 2021-12-07 NOTE — PROGRESS NOTE ADULT - ASSESSMENT
71 yo F with PMH of HTN, T2DM, asthma, seizure disorder, Parkinson's disease, glaucoma, schizophrenia, hemorrhoids BIBEMS from [facility] for acute change in mental status i/s/o hypotension. Likely medication-induced given negative brain imaging and negative metabolic and infectious work up, now with decompensated schizophrenia. Waiting for acceptance by inpatient psych facility, going [facility] on Monday Dec 6 per [facility]

## 2021-12-07 NOTE — BH CONSULTATION LIAISON PROGRESS NOTE - NSCDBILL_PSY_A_CORE
[id number] - Inpatient, high complexity
[id number] - Inpatient, high complexity
[id number] - Inpatient, high complexity
[id number] - Inpatient, high complexity
[id number] - Inpatient, high complexity
[id number] - Inpatient, high complexity
[id number] - Inpatient, high complexity
[id number] - Inpatient, high complexity
[id number] - Inpatient, high complexity
[id number] - Inpatient, high complexity

## 2021-12-07 NOTE — BH CONSULTATION LIAISON PROGRESS NOTE - NSBHASSESSMENTFT_PSY_ALL_CORE
Pt is a 71 y/o AAF, domiciled at [facility], long hx of Schizophrenia with multiple past inpatient hospitalizations, last hospitalization started at [facility] 10/13/21, past hx of aggression towards staff and peers, no documented hx of SA, pertinent PMH includes HLD, HTN, Parkinson's dx. Seizure d/o, T2DM, Hypothyroidism, Glaucoma, PVD, Asthma, Bladder Ca. Pt was found unconscious on the floor at [facility] on 11/20/21, BIBEMS from [facility] for acute change in mental status.     Pt is currently being medically managed by the medicine and neurology team for AMS episode. CT head, CTA head and neck and CT perfusion has been done, grossly negative. R/o infectious and metabolic causes of AMS. Pt care was discussed with Dr. Chung at [facility] on 11/22, patient has been manic, labile, tearful and hyperreligious, consistent with history of schizophrenia, currently decompensated. Pt currently lacks insight, she is paranoid with extremely disorganized thoughts making her a harm to herself and others and requires continued inpatient hospitalization for monitoring and medication reconciliation. Pt has a history of aggression. She is unpredictable and impulsive. Although cooperative on prior exams, history of agitation and aggression warrants continued low threshold for PRN and restraints. Current PRN Zyprexa 2.5mg q6hrs, with additional dose if pt lacks response. Rx was restarted on psychiatric medications due to concern for psychiatric decompensation.     12/4- patient remains notably disorganized, wandering, with unclear speech, intermittently with irritability/agitation requiring PRN  12/5 - patient irritable, refuses to engage with psychiatric interview, intermittent agitation requiring PRN  12/6 - patient still psychotic and manic, no AH/VH but still paranoid, needs psych admission, no longer notably delirious AOx3  12/7 - patient unchanged, would increase meds as below, awaiting hospital transfer     Plan:  - Consider Enhanced Supervision given improvements in behavior; now transferred to 7S  	- LOW threshold for PRN usage for agitation due to history of aggression  	- Consider restraints if agitation escalates or aggression  - Increase Zyprexa to 5mg @8am + 5mg @2pm + 7.5mg @8pm   - Continue Benztropine 0.25mg BID  - Continue Depakote 500mg qAM + 750mg qHS. Would defer further titration if needed as per primary/neuro team  - If patient agitated and QTc < 500, can use Zyprexa 2.5mg PO/IM q6h PRN per agitation regimen at [facility]. If open to PO c/w current dose of Seroquel 50mg PO Q 4 PRN for agitation  - Neuro note appreciated, keep sinemet at current dose  - PT - walking around well  - Patient needs inpatient psychiatric admission, 2PC completed, in pt chart  - Cl Psych to continue to follow

## 2021-12-07 NOTE — PROGRESS NOTE ADULT - PROBLEM SELECTOR PLAN 1
Transferred from [facility]. Decompensated Schizophrenia. w/ disorganized thought process, paranoia, Restoration preoccupations    -psych rec continuing Seroquel 50 mg q4 PRN, Ativan 1mg PRN (for severe agitation) and Zyprexa 2.5 mg IM q6 PRN for agitation and holding Ativan if not having any effect due to deliriogenic properties that may be worsening agitation. No Haldol due to Parkinson's disease  - agitated at times, now calm and cooperative   - per psych discussion with Dr. Chung at [facility], patient has been manic, labile, tearful and hyperreligious while at [facility]  - continue depakote 750mg qhs and Cogentin 0.5mg BID  - c/w Risperdal 1.5mg BID    - c/w Ativan 1 mg PO BID per psych to avoid withdrawal  - decrease cogentin to 0.25mg BID  - psych rec continuing to hold trazodone and gabapentin due to recent AMS and to avoid polypharmacy    - pt will need inpatient psychiatric treatment for further stabilization per psych. going [facility] on Monday Dec 6 per [facility]

## 2021-12-07 NOTE — PROGRESS NOTE ADULT - PROBLEM SELECTOR PLAN 3
scaly approx 3 cm rash on dorsal surface of foot with two <1 cm lesions, skin broken, hyperkeratin on perimeter of rash. now improving per nurse    - ammonium lactate cream not helping  - trial clotrimazole cream  - consider derm consult

## 2021-12-07 NOTE — PROGRESS NOTE ADULT - ATTENDING COMMENTS
-remains agitated and disorganized. Awaiting placement in inpt psych facility. DC when bed available.

## 2021-12-07 NOTE — BH PATIENT PROFILE - FALL HARM RISK - HARM RISK INTERVENTIONS
Assistance with ambulation/Assistance OOB with selected safe patient handling equipment/Communicate Risk of Fall with Harm to all staff/Discuss with provider need for PT consult/Monitor gait and stability/Reinforce activity limits and safety measures with patient and family/Review medications for side effects contributing to fall risk/Sit up slowly, dangle for a short time, stand at bedside before walking/Tailored Fall Risk Interventions/Toileting schedule using arm’s reach rule for commode and bathroom/Visual Cue: Yellow wristband and red socks/Bed in lowest position, wheels locked, appropriate side rails in place/Call bell, personal items and telephone in reach/Instruct patient to call for assistance before getting out of bed or chair/Non-slip footwear when patient is out of bed/[place] to call system/Physically safe environment - no spills, clutter or unnecessary equipment/Purposeful Proactive Rounding/Room/bathroom lighting operational, light cord in reach

## 2021-12-07 NOTE — BH CONSULTATION LIAISON PROGRESS NOTE - NSICDXBHPRIMARYDX_PSY_ALL_CORE
Schizophrenia   F20.9  

## 2021-12-07 NOTE — BH PATIENT PROFILE - NSPROEDALEARNPREF_GEN_A_NUR
DISPLAY PLAN FREE TEXT DISPLAY PLAN FREE TEXT DISPLAY PLAN FREE TEXT DISPLAY PLAN FREE TEXT DISPLAY PLAN FREE TEXT DISPLAY PLAN FREE TEXT DISPLAY PLAN FREE TEXT verbal instruction

## 2021-12-07 NOTE — PROGRESS NOTE ADULT - ATTENDING SUPERVISION STATEMENT
Resident

## 2021-12-07 NOTE — PROGRESS NOTE ADULT - PROBLEM SELECTOR PROBLEM 7
Parkinson disease
Parkinson disease
Prophylactic measure
Prophylactic measure
Parkinson disease
Parkinson disease
Prophylactic measure
Prophylactic measure
Parkinson disease
Prophylactic measure

## 2021-12-08 DIAGNOSIS — I10 ESSENTIAL (PRIMARY) HYPERTENSION: ICD-10-CM

## 2021-12-08 DIAGNOSIS — L85.3 XEROSIS CUTIS: ICD-10-CM

## 2021-12-08 LAB — GLUCOSE BLDC GLUCOMTR-MCNC: 145 MG/DL — HIGH (ref 70–99)

## 2021-12-08 PROCEDURE — 99221 1ST HOSP IP/OBS SF/LOW 40: CPT

## 2021-12-08 RX ORDER — OLANZAPINE 15 MG/1
5 TABLET, FILM COATED ORAL ONCE
Refills: 0 | Status: COMPLETED | OUTPATIENT
Start: 2021-12-08 | End: 2021-12-08

## 2021-12-08 RX ORDER — LATANOPROST 0.05 MG/ML
1 SOLUTION/ DROPS OPHTHALMIC; TOPICAL AT BEDTIME
Refills: 0 | Status: DISCONTINUED | OUTPATIENT
Start: 2021-12-08 | End: 2021-12-17

## 2021-12-08 RX ORDER — DIVALPROEX SODIUM 500 MG/1
500 TABLET, DELAYED RELEASE ORAL DAILY
Refills: 0 | Status: DISCONTINUED | OUTPATIENT
Start: 2021-12-08 | End: 2021-12-17

## 2021-12-08 RX ORDER — BENZTROPINE MESYLATE 1 MG
0.25 TABLET ORAL
Refills: 0 | Status: DISCONTINUED | OUTPATIENT
Start: 2021-12-08 | End: 2021-12-17

## 2021-12-08 RX ORDER — BACITRACIN ZINC NEOMYCIN SULFATE POLYMYXIN B SULFATE 400; 3.5; 5 [IU]/G; MG/G; [IU]/G
1 OINTMENT TOPICAL
Refills: 0 | Status: DISCONTINUED | OUTPATIENT
Start: 2021-12-08 | End: 2021-12-17

## 2021-12-08 RX ORDER — OLANZAPINE 15 MG/1
5 TABLET, FILM COATED ORAL EVERY 8 HOURS
Refills: 0 | Status: ACTIVE | OUTPATIENT
Start: 2021-12-08 | End: 2022-11-06

## 2021-12-08 RX ORDER — DIBUCAINE 1 %
1 OINTMENT (GRAM) RECTAL
Refills: 0 | Status: DISCONTINUED | OUTPATIENT
Start: 2021-12-08 | End: 2021-12-17

## 2021-12-08 RX ADMIN — QUETIAPINE FUMARATE 50 MILLIGRAM(S): 200 TABLET, FILM COATED ORAL at 01:33

## 2021-12-08 RX ADMIN — Medication 25 MILLIGRAM(S): at 10:12

## 2021-12-08 RX ADMIN — RISPERIDONE 1.5 MILLIGRAM(S): 4 TABLET ORAL at 10:42

## 2021-12-08 RX ADMIN — LISINOPRIL 10 MILLIGRAM(S): 2.5 TABLET ORAL at 10:08

## 2021-12-08 RX ADMIN — Medication 75 MICROGRAM(S): at 06:51

## 2021-12-08 RX ADMIN — DIVALPROEX SODIUM 500 MILLIGRAM(S): 500 TABLET, DELAYED RELEASE ORAL at 18:29

## 2021-12-08 RX ADMIN — Medication 1 APPLICATION(S): at 10:07

## 2021-12-08 RX ADMIN — OLANZAPINE 5 MILLIGRAM(S): 15 TABLET, FILM COATED ORAL at 22:05

## 2021-12-08 RX ADMIN — CARBIDOPA AND LEVODOPA 1 TABLET(S): 25; 100 TABLET ORAL at 13:25

## 2021-12-08 RX ADMIN — CARBIDOPA AND LEVODOPA 1 TABLET(S): 25; 100 TABLET ORAL at 10:42

## 2021-12-08 RX ADMIN — POLYETHYLENE GLYCOL 3350 17 GRAM(S): 17 POWDER, FOR SOLUTION ORAL at 10:43

## 2021-12-08 RX ADMIN — Medication 0.25 MILLIGRAM(S): at 10:07

## 2021-12-08 NOTE — BH INPATIENT PSYCHIATRY ASSESSMENT NOTE - NSBHMETABOLIC_PSY_ALL_CORE_FT
BMI: BMI (kg/m2): 24.2 (12-08-21 @ 04:44)  HbA1c: A1C with Estimated Average Glucose Result: 5.6 % (10-15-21 @ 11:00)    Glucose: POCT Blood Glucose.: 145 mg/dL (12-08-21 @ 06:49)    BP: 169/108 (12-08-21 @ 09:00) (167/81 - 186/78)  Lipid Panel: Date/Time: 10-14-21 @ 09:38  Cholesterol, Serum: 159  Direct LDL: --  HDL Cholesterol, Serum: 66  Total Cholesterol/HDL Ration Measurement: --  Triglycerides, Serum: 100

## 2021-12-08 NOTE — CONSULT NOTE ADULT - SUBJECTIVE AND OBJECTIVE BOX
INTERNAL MEDICINE SERVICE INITIAL CONSULT NOTE:    71 yo domiciled ([facility]) F with PMH of HTN, T2DM, asthma, seizure disorder, Parkinson's disease, glaucoma, schizophrenia (with multiple past inpatient hospitalizations, last at [facility] October 2021) hemorrhoids BIBEMS from [facility] for acute change in mental status i/s/o hypotension. Likely medication-induced given negative brain imaging and negative metabolic and infectious work up, now with decompensated schizophrenia. Per inpatient psychiatry notes at [facility], patient was having increasing agitated episodes requiring physical and chemical restraints. Was transferred from [facility] to [facility] inpatient psychiatry unit on 12/7 for further management and concern for psychiatric decompensation.    Medicine consulted on 12/7 by inpatient psychiatry team for rash on foot and management of hypertension.     ADDITIONAL MEDICINE HPI:    Review of the chart reveals that patient's BP's in [facility] ranged from 120s-160s systolic. On transfer to [facility] inpatient psychiatry, patient's /78 and improved to 167/81 after getting lopressor 25mg PO (home medication). Per chart review, patient takes lisinopril 10mg PO daily and lopressor 25mg PO BID daily at home for HTN. Per conversation with RN, patient was slightly agitated on arrival to [facility].     As for the foot rash, inpatient notes from [facility] detail a scaly, 3cm rash on dorsal surce of L foot with two <1cm lesions, broken skin and hyperkeratin formation on perimeter. Improved during course of stay, using ammonium lactate and clotrimazole cream.     Subjective: Patient seen and examined at bedside with RN as chaperone. She was awake despite it being 11:30 PM and asked the resident and RN to leave multiple times during the interview because she had to "fix something". Interview was tangential and patient was distracted at times, but was able to provide some additional information - she states that the rash on her foot has been a problem for 1-2 months, prior to her admission to the hospital. She denies any bleeding or discharge to the area. She denies any numbness or tingling. She denies any headache, blurry vision or abdominal/chest pain at this time. Denies palpitations.     REVIEW OF SYSTEMS:   Otherwise negative except as specified in HPI    MEDICATIONS:  MEDICATIONS  (STANDING):  benztropine 0.25 milliGRAM(s) Oral two times a day  carbidopa/levodopa  25/100 1 Tablet(s) Oral three times a day  clotrimazole 1% Cream 1 Application(s) Topical two times a day  diVALproex  milliGRAM(s) Oral at bedtime  levothyroxine 75 MICROGram(s) Oral daily  lisinopril 10 milliGRAM(s) Oral daily  metoprolol tartrate 25 milliGRAM(s) Oral two times a day  polyethylene glycol 3350 17 Gram(s) Oral daily  risperiDONE   Tablet 1.5 milliGRAM(s) Oral two times a day  senna 2 Tablet(s) Oral at bedtime    MEDICATIONS  (PRN):  QUEtiapine 50 milliGRAM(s) Oral four times a day PRN agitation      ALLERGIES:  Allergies    penicillin (Hives; Rash)    Intolerances    VITAL SIGNS:  Vital Signs Last 24 Hrs  T(C): 36.6 (07 Dec 2021 19:30), Max: 37 (07 Dec 2021 06:57)  T(F): 97.8 (07 Dec 2021 19:30), Max: 98.6 (07 Dec 2021 06:57)  HR: 82 (07 Dec 2021 22:51) (82 - 93)  BP: 167/81 (07 Dec 2021 22:51) (139/79 - 186/78)  BP(mean): --  RR: 20 (07 Dec 2021 19:30) (16 - 20)  SpO2: 96% (07 Dec 2021 22:51) (96% - 100%)    PHYSICAL EXAM:  Constitutional: WDWN sitting at edge of bed and intermittently standing up, does not appear to be in distress   HEENT: PERRL, EOMI, anicteric sclera, no nasal discharge; uvula midline, no oropharyngeal erythema or exudates; MMM  Neck: supple; no JVD or thyromegaly  Respiratory: CTA B/L; no W/R/R, no retractions  Cardiac: +S1/S2; RRR; no M/R/G; PMI non-displaced  Gastrointestinal: abdomen soft, NT/ND; no rebound or guarding; +BSx4  Back: spine midline, no bony tenderness or step-offs; no CVAT B/L  Extremities: WWP, no clubbing or cyanosis; no peripheral edema  Musculoskeletal: NROM x4; no joint swelling, tenderness or erythema  Vascular: 2+ radial, femoral, DP/PT pulses B/L  Dermatologic: scaly, hyperkeratotic lesion extending from third toe on L forefoot (dorsal aspect) to plantar arch of plantar aspect of foot. Areas of excoriation with overlying skin sloughed off in certain areas, no active bleeding or drainage. Non foul smelling.   Lymphatic: no submandibular or cervical LAD  Neurologic: AAOx3; CNII-XII grossly intact; no focal deficits  Psychiatric: slightly tangential thinking, otherwise calm and cooperative     LABS:                  CAPILLARY BLOOD GLUCOSE      POCT Blood Glucose.: 85 mg/dL (07 Dec 2021 16:21)  POCT Blood Glucose.: 92 mg/dL (07 Dec 2021 11:44)  POCT Blood Glucose.: 102 mg/dL (07 Dec 2021 07:52)          RADIOLOGY & ADDITIONAL TESTS: Reviewed.

## 2021-12-08 NOTE — CONSULT NOTE ADULT - PROBLEM SELECTOR RECOMMENDATION 2
Hypertension on admission likely 2/2 agitation. Patient has been intermittently hypertensive during previous days of admissions as well. As patient was exhibiting odd behavior during the exam (asking resident and RN to leave multiple times so she could "fix something") - suspect a possible illicit substance ingestion which would also possibly be driving up blood pressure. BP has already improved from 180s to 160s after patient received one dose of her lopressor. Does not have any symptoms of hypertensive emergency (blurry vision, headache, etc. at this time).     Recommendations:   - Reassess blood pressure tomorrow after patient has received her lisinopril and second dose of lopressor   - Would obtain UTox   - Would obtain updated EKG

## 2021-12-08 NOTE — CONSULT NOTE ADULT - ATTENDING COMMENTS
reviewed pertinent data , chart note  patient seen and examined overnight     pt seen w/ RN chaperone in seclusion room as pt had been agitated earlier. pt was more calm. exam limited to feet. skin findings of feet as noted above. nontender, nonerythematous,  nonpurulent, scaly peeling skin noted.     1. topical emollients to dry scaly skin, agree w/ wound care otherwise consult dermatology  2. monitor BP on home BP meds, plan as above.

## 2021-12-08 NOTE — BH CHART NOTE - NSHIGHRISKBEHFT_PSY_ALL_CORE
While patient was taking a shower, she pulled out her prolapsed Uterus, thinking she is pregnant. When writer asked her who is the father she said Obama, when asked how she got pregnant, she said I saw him in the TV. Patient seen actively hallucinating, talking to self, saying "Obama why you left pretty lady no  section". Patient later asked writer to bring her warm water to help her with vaginal delivery. At this time, patient is responding to redirection.     This event seems to be secondary to active psychotic process. GYN consulted. Will change observation level to 1:1 for disorganization and active psychosis.

## 2021-12-08 NOTE — PROGRESS NOTE ADULT - SUBJECTIVE AND OBJECTIVE BOX
71 yo domiciled ([facility]) F with PMH of HTN, T2DM, asthma, seizure disorder, Parkinson's disease, glaucoma, schizophrenia (with multiple past inpatient hospitalizations, last at [facility] October 2021) hemorrhoids BIBEMS from [facility] for acute change in mental status in setting of hypotension. Likely medication-induced given negative brain imaging and negative metabolic and infectious work up, now with decompensated schizophrenia. Per inpatient psychiatry notes at [facility], patient was having increasing agitated episodes requiring physical and chemical restraints. Was transferred from [facility] to [facility] inpatient psychiatry unit on 12/7 for further management and concern for psychiatric decompensation.    GYN consulted for evaluation of vaginal mass. Unable to get additional history from patient. Per staffing" patient thinks she is "having a baby". She placed toothpaste on her feet so the baby would not get an infection. Patient is touching and pulling and vaginal mass.       PAST MEDICAL & SURGICAL HISTORY:  Parkinson disease    Seizure disorder    Hypothyroidism    Type II diabetes mellitus    Hyperlipidemia    Schizophrenia    Hemorrhoids    Hypertension    No significant past surgical history      MEDICATIONS  (STANDING):  benztropine 0.25 milliGRAM(s) Oral two times a day  carbidopa/levodopa  25/100 1 Tablet(s) Oral three times a day  clotrimazole 1% Cream 1 Application(s) Topical two times a day  diVALproex  milliGRAM(s) Oral daily  diVALproex  milliGRAM(s) Oral at bedtime  latanoprost 0.005% Ophthalmic Solution 1 Drop(s) Both EYES at bedtime  levothyroxine 75 MICROGram(s) Oral daily  lisinopril 10 milliGRAM(s) Oral daily  metoprolol tartrate 25 milliGRAM(s) Oral two times a day  neomycin/bacitracin/polymyxin Topical Ointment 1 Application(s) Topical two times a day  OLANZapine 5 milliGRAM(s) Oral every 8 hours  polyethylene glycol 3350 17 Gram(s) Oral daily  senna 2 Tablet(s) Oral at bedtime    MEDICATIONS  (PRN):  dibucaine 1% Ointment 1 Application(s) Topical two times a day PRN Hemorrhoids  QUEtiapine 50 milliGRAM(s) Oral four times a day PRN agitation      Allergies    penicillin (Hives; Rash)            PHYSICAL EXAM:   Vital Signs Last 24 Hrs  T(C): 36.6 (07 Dec 2021 19:30), Max: 36.6 (07 Dec 2021 19:30)  T(F): 97.8 (07 Dec 2021 19:30), Max: 97.8 (07 Dec 2021 19:30)  HR: 97 (08 Dec 2021 09:00) (82 - 97)  BP: 169/108 (08 Dec 2021 09:00) (167/81 - 186/78)  BP(mean): --  RR: 16 (08 Dec 2021 09:00) (16 - 20)  SpO2: 100% (08 Dec 2021 09:00) (96% - 100%)    **************************  Constitutional: patient on hands and knees on bed, does not appear to be in acute distress  Gastrointestinal: soft, non tender, positive bowel sounds, no rebound or guarding   Speculum exam: deferred  Pelvic exam: stage 4 prolapse pink cervix visualized, no excoriations, ulcerations or bleeding noted  Small rectocele noted.  Extremities: no calf tenderness or swelling, white toothpaste covering both feet      LABS:                RADIOLOGY & ADDITIONAL STUDIES:
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
## 2021-12-08 NOTE — PROGRESS NOTE ADULT - ASSESSMENT
71 yo domiciled ([facility]) F with PMH of HTN, T2DM, asthma, seizure disorder, Parkinson's disease, glaucoma, schizophrenia (with multiple past inpatient hospitalizations, last at [facility] October 2021) hemorrhoids BIBEMS from [facility] for acute change in mental status i/s/o hypotension. Now transferred from [facility] to [facility] for further management of psychiatric deconditioning. GYN consulted for uterine prolapse    #Uterine prolapse  - Stage 4 prolapse, no signs of ulceration or bleeding  - Prolapse covered with Vaseline and replaced easily.   - Discussed w/nursing staff importance of keeping area moist with Vaseline to prevent irritation   - No GYN intervention at this time. GYN Signing off  - Patient can follow up at [facility] for possible pessary placement vs surgical management, after discharge. [address] [facility] [address] [phone]        Plan discussed w/Dr. Arellano

## 2021-12-08 NOTE — PHYSICAL THERAPY INITIAL EVALUATION ADULT - PERTINENT HX OF CURRENT PROBLEM, REHAB EVAL
69 yo domiciled ([facility]) F with PMH of HTN, T2DM, asthma, seizure disorder, Parkinson's disease, glaucoma, schizophrenia (with multiple past inpatient hospitalizations, last at [facility] October 2021) hemorrhoids BIBEMS from [facility] for acute change in mental status i/s/o hypotension. Likely medication-induced given negative brain imaging and negative metabolic and infectious work up, now with decompensated schizophrenia. PT consulted due to hx of PD.

## 2021-12-08 NOTE — CONSULT NOTE ADULT - PROBLEM SELECTOR RECOMMENDATION 9
Examination of L foot reveals markedly dry skin with overlying areas of excoriation and hyperpigmentation. No leukocytosis, no fever, no concern for infectious process at this time.     Recommendations:   - Allevyn dressing on applied to dorsal and plantar aspect of skin with barrier cream and topical antiseptic (Neosporin)   - Recommend reaching out to wound care (can place Wound Care nursing consult on [place]) for further management if condition does not improve

## 2021-12-08 NOTE — CONSULT NOTE ADULT - ASSESSMENT
69 yo domiciled ([facility]) F with PMH of HTN, T2DM, asthma, seizure disorder, Parkinson's disease, glaucoma, schizophrenia (with multiple past inpatient hospitalizations, last at [facility] October 2021) hemorrhoids BIBEMS from [facility] for acute change in mental status i/s/o hypotension. Now transferred from [facility] to [facility] for further management of psychiatric deconditioning. Medicine consulted for L foot rash and hypertension.      71 yo domiciled ([facility]) F with PMH of HTN, T2DM, asthma, seizure disorder, Parkinson's disease, glaucoma, schizophrenia (with multiple past inpatient hospitalizations, last at [facility] October 2021) hemorrhoids BIBEMS from [facility] for acute change in mental status i/s/o hypotension. Now transferred from [facility] to [facility] for further management of psychiatric deconditioning. Medicine consulted for L foot rash and hypertension.

## 2021-12-08 NOTE — BH INPATIENT PSYCHIATRY ASSESSMENT NOTE - NSACTIVEVENT_PSY_ALL_CORE
Triggering events leading to humiliation, shame, and/or despair (e.g., Loss of relationship, financial or health status) (real or anticipated)/Current or pending social isolation/Inadequate social supports/Perceived burden on family or others

## 2021-12-08 NOTE — BH INPATIENT PSYCHIATRY ASSESSMENT NOTE - NSBHCHARTREVIEWVS_PSY_A_CORE FT
Vital Signs Last 24 Hrs  T(C): 36.6 (12-07-21 @ 19:30), Max: 36.6 (12-07-21 @ 19:30)  T(F): 97.8 (12-07-21 @ 19:30), Max: 97.8 (12-07-21 @ 19:30)  HR: 97 (12-08-21 @ 09:00) (82 - 97)  BP: 169/108 (12-08-21 @ 09:00) (167/81 - 186/78)  BP(mean): --  RR: 16 (12-08-21 @ 09:00) (16 - 20)  SpO2: 100% (12-08-21 @ 09:00) (96% - 100%)

## 2021-12-08 NOTE — BH INPATIENT PSYCHIATRY ASSESSMENT NOTE - NSSUICPROTFACT_PSY_ALL_CORE
Responsibility to children, family, or others/Identifies reasons for living/Cultural, spiritual and/or moral attitudes against suicide

## 2021-12-08 NOTE — BH INPATIENT PSYCHIATRY ASSESSMENT NOTE - CURRENT MEDICATION
MEDICATIONS  (STANDING):  benztropine 0.25 milliGRAM(s) Oral two times a day  carbidopa/levodopa  25/100 1 Tablet(s) Oral three times a day  clotrimazole 1% Cream 1 Application(s) Topical two times a day  diVALproex  milliGRAM(s) Oral at bedtime  levothyroxine 75 MICROGram(s) Oral daily  lisinopril 10 milliGRAM(s) Oral daily  metoprolol tartrate 25 milliGRAM(s) Oral two times a day  polyethylene glycol 3350 17 Gram(s) Oral daily  senna 2 Tablet(s) Oral at bedtime    MEDICATIONS  (PRN):  QUEtiapine 50 milliGRAM(s) Oral four times a day PRN agitation   MEDICATIONS  (STANDING):  carbidopa/levodopa  25/100 1 Tablet(s) Oral three times a day  clotrimazole 1% Cream 1 Application(s) Topical two times a day  diVALproex  milliGRAM(s) Oral at bedtime  levothyroxine 75 MICROGram(s) Oral daily  lisinopril 10 milliGRAM(s) Oral daily  metoprolol tartrate 25 milliGRAM(s) Oral two times a day  OLANZapine 5 milliGRAM(s) Oral every 8 hours  polyethylene glycol 3350 17 Gram(s) Oral daily  senna 2 Tablet(s) Oral at bedtime    MEDICATIONS  (PRN):  QUEtiapine 50 milliGRAM(s) Oral four times a day PRN agitation   MEDICATIONS  (STANDING):  benztropine 0.25 milliGRAM(s) Oral two times a day  carbidopa/levodopa  25/100 1 Tablet(s) Oral three times a day  clotrimazole 1% Cream 1 Application(s) Topical two times a day  diVALproex  milliGRAM(s) Oral daily  diVALproex  milliGRAM(s) Oral at bedtime  latanoprost 0.005% Ophthalmic Solution 1 Drop(s) Both EYES at bedtime  levothyroxine 75 MICROGram(s) Oral daily  lisinopril 10 milliGRAM(s) Oral daily  metoprolol tartrate 25 milliGRAM(s) Oral two times a day  OLANZapine 5 milliGRAM(s) Oral every 8 hours  polyethylene glycol 3350 17 Gram(s) Oral daily  senna 2 Tablet(s) Oral at bedtime    MEDICATIONS  (PRN):  dibucaine 1% Ointment 1 Application(s) Topical two times a day PRN Hemorrhoids  QUEtiapine 50 milliGRAM(s) Oral four times a day PRN agitation

## 2021-12-08 NOTE — CHART NOTE - NSCHARTNOTEFT_GEN_A_CORE
[facility]  PHYSICAL EXAM: Agree/Declined    VITALS: T(C): 36.6 (12-07-21 @ 19:30), Max: 36.7 (12-07-21 @ 10:52)  HR: 82 (12-07-21 @ 22:51) (82 - 90)  BP: 167/81 (12-07-21 @ 22:51) (139/79 - 186/78)  RR: 20 (12-07-21 @ 19:30) (17 - 20)  SpO2: 96% (12-07-21 @ 22:51) (96% - 98%)      GENERAL: NAD, comfortable, ambulating  HEAD:  Atraumatic, Normocephalic  EYES: EOMI, PERRLA, conjunctiva and sclera clear  ENT: Moist mucous membranes  NECK: Supple, No JVD  CHEST/LUNG: Clear to auscultation bilaterally; No rales, rhonchi, wheezing, or rubs. Unlabored respirations  HEART: Regular rate and rhythm; No murmurs, rubs, or gallops  ABDOMEN: BSx4; Soft, nontender, nondistended  EXTREMITIES:  No clubbing, cyanosis, or edema  NERVOUS SYSTEM:  A&Ox3, no focal deficits   SKIN: No rashes or lesions PHYSICAL EXAM: Declined    VITALS: T(C): 36.6 (12-07-21 @ 19:30), Max: 36.7 (12-07-21 @ 10:52)  HR: 82 (12-07-21 @ 22:51) (82 - 90)  BP: 167/81 (12-07-21 @ 22:51) (139/79 - 186/78)  RR: 20 (12-07-21 @ 19:30) (17 - 20)  SpO2: 96% (12-07-21 @ 22:51) (96% - 98%)      GENERAL: NAD, comfortable, ambulating  HEAD:  Atraumatic, Normocephalic  EYES: conjunctiva and sclera clear  ENT: many missing teeth  NECK: Supple, No JVD  CHEST/LUNG: Unlabored respirations  EXTREMITIES:  No clubbing, cyanosis, or edema  NERVOUS SYSTEM:  A&O to person and place, not time. Only able to name 3/4 seasons, no focal deficits.  Appeared to have some parkinsonian features like rigidity but patient declined tone assessment

## 2021-12-08 NOTE — PHYSICAL THERAPY INITIAL EVALUATION ADULT - ADDITIONAL COMMENTS
Pt. not compliant with exam, as per chart pt. resides at a SNF and uses wheelchair. Pt. not compliant with exam, as per chart pt. resides at a SNF and uses wheelchair.    Attempted to engage with patient regarding mobility - pt seen dancing and balancing on 1 foot in hallway, no LOB. Therapist asked patient if she does any exercise program to which she replied "when the lord tells me" and then she punched the air. Pt. walked away singing. Pt. is observed by therapist to be independent and maintains good balance with dynamic activity.

## 2021-12-08 NOTE — BH INPATIENT PSYCHIATRY ASSESSMENT NOTE - NSBHASSESSSUMMFT_PSY_ALL_CORE
69 yo domiciled ([facility]) F with PMH of HTN, T2DM, asthma, seizure disorder, Parkinson's disease, glaucoma, schizophrenia (with multiple past inpatient hospitalizations, last at [facility] October 2021) hemorrhoids BIBEMS from [facility] for acute change in mental status i/s/o hypotension. Likely medication-induced given negative brain imaging and negative metabolic and infectious work up, now with decompensated schizophrenia. Per inpatient psychiatry notes at [facility], patient was having increasing agitated episodes requiring physical and chemical restraints. Was transferred from [facility] to [facility] inpatient psychiatry unit on 12/7 for further management and concern for psychiatric decompensation. Medicine consulted on 12/7 by inpatient psychiatry team for rash on foot and management of hypertension.  As per today's assessment, patient was seen paranoid, grandiose, irritable, with elevated mood and auditory and visual hallucinations . Patient identifies herself as Roman Catholic. Patient denies any SI/HI/intent/plan. Will continue patient on Depakote 750 mg ER, and will start patient on Olanzapine 5 mg TID.    71 yo domiciled ([facility]) F with PMH of HTN, T2DM, asthma, seizure disorder, Parkinson's disease, glaucoma, schizophrenia (with multiple past inpatient hospitalizations, last at [facility] October 2021) hemorrhoids BIBEMS from [facility] for acute change in mental status i/s/o hypotension. Likely medication-induced given negative brain imaging and negative metabolic and infectious work up, now with decompensated schizophrenia. Per inpatient psychiatry notes at [facility], patient was having increasing agitated episodes requiring physical and chemical restraints. Was transferred from [facility] to [facility] inpatient psychiatry unit on 12/7 for further management and concern for psychiatric decompensation. Medicine consulted on 12/7 by inpatient psychiatry team for rash on foot and management of hypertension.  As per today's assessment, patient was seen paranoid, grandiose, irritable, with elevated mood and auditory and visual hallucinations . Patient identifies herself as Quaker. Patient denies any SI/HI/intent/plan. This presentation seems to be secondary to psychotic and mood decompensation. Will continue patient on Depakote 750 mg ER, and will start patient on Olanzapine 5 mg TID.    71 yo domiciled ([facility]) F with f HTN, HLD, T2DM, asthma, seizure disorder, Parkinson's disease, glaucoma, PVD, bladder Ca, schizophrenia (with multiple past inpatient hospitalizations, last at [facility] October 2021) hemorrhoids BIBEMS from [facility] for acute change in mental status i/s/o hypotension. Likely medication-induced given negative brain imaging and negative metabolic and infectious work up, now with decompensated schizophrenia. Per inpatient psychiatry notes at [facility], patient was having increasing agitated episodes requiring physical and chemical restraints. Was transferred from [facility] to [facility] inpatient psychiatry unit on 12/7 for further management and concern for psychiatric decompensation. Medicine consulted on 12/7 by inpatient psychiatry team for rash on foot and management of hypertension.  As per today's assessment, patient was seen paranoid, grandiose, irritable, with elevated mood and auditory and visual hallucinations . Patient identifies herself as Holiness. Patient denies any SI/HI/intent/plan. This presentation seems to be secondary to psychotic and mood decompensation. Will continue patient on Depakote 750 mg ER, and will start patient on Olanzapine 5 mg TID.

## 2021-12-08 NOTE — BH INPATIENT PSYCHIATRY ASSESSMENT NOTE - CASE SUMMARY
Pt psychotic, manic, and demented. Convinced she has seen her children on the unit. +AH and +VH. FS and HgbA1C don't seem to warrant regular fingersticks. Have spoken with Dr. Brown Vincent about him at [facility]. Pt usually goes to [facility] but insurance would not pay for [facility].

## 2021-12-09 DIAGNOSIS — R41.9 UNSPECIFIED SYMPTOMS AND SIGNS INVOLVING COGNITIVE FUNCTIONS AND AWARENESS: ICD-10-CM

## 2021-12-09 LAB
A1C WITH ESTIMATED AVERAGE GLUCOSE RESULT: 6 % — HIGH (ref 4–5.6)
CHOLEST SERPL-MCNC: 133 MG/DL — SIGNIFICANT CHANGE UP
ESTIMATED AVERAGE GLUCOSE: 126 MG/DL — HIGH (ref 68–114)
HDLC SERPL-MCNC: 67 MG/DL — SIGNIFICANT CHANGE UP
LIPID PNL WITH DIRECT LDL SERPL: 59 MG/DL — SIGNIFICANT CHANGE UP
NON HDL CHOLESTEROL: 66 MG/DL — SIGNIFICANT CHANGE UP
TRIGL SERPL-MCNC: 37 MG/DL — SIGNIFICANT CHANGE UP

## 2021-12-09 PROCEDURE — 99232 SBSQ HOSP IP/OBS MODERATE 35: CPT

## 2021-12-09 RX ADMIN — Medication 1 APPLICATION(S): at 22:32

## 2021-12-09 RX ADMIN — LISINOPRIL 10 MILLIGRAM(S): 2.5 TABLET ORAL at 10:22

## 2021-12-09 RX ADMIN — SENNA PLUS 2 TABLET(S): 8.6 TABLET ORAL at 22:29

## 2021-12-09 RX ADMIN — CARBIDOPA AND LEVODOPA 1 TABLET(S): 25; 100 TABLET ORAL at 19:32

## 2021-12-09 RX ADMIN — Medication 1 APPLICATION(S): at 22:31

## 2021-12-09 RX ADMIN — CARBIDOPA AND LEVODOPA 1 TABLET(S): 25; 100 TABLET ORAL at 22:30

## 2021-12-09 RX ADMIN — Medication 0.25 MILLIGRAM(S): at 22:30

## 2021-12-09 RX ADMIN — Medication 3 MILLIGRAM(S): at 02:55

## 2021-12-09 RX ADMIN — Medication 25 MILLIGRAM(S): at 22:30

## 2021-12-09 RX ADMIN — CARBIDOPA AND LEVODOPA 1 TABLET(S): 25; 100 TABLET ORAL at 10:38

## 2021-12-09 RX ADMIN — BACITRACIN ZINC NEOMYCIN SULFATE POLYMYXIN B SULFATE 1 APPLICATION(S): 400; 3.5; 5 OINTMENT TOPICAL at 22:33

## 2021-12-09 RX ADMIN — Medication 1 APPLICATION(S): at 14:38

## 2021-12-09 RX ADMIN — Medication 75 MICROGRAM(S): at 10:22

## 2021-12-09 RX ADMIN — LATANOPROST 1 DROP(S): 0.05 SOLUTION/ DROPS OPHTHALMIC; TOPICAL at 22:31

## 2021-12-09 RX ADMIN — Medication 0.25 MILLIGRAM(S): at 10:23

## 2021-12-09 RX ADMIN — POLYETHYLENE GLYCOL 3350 17 GRAM(S): 17 POWDER, FOR SOLUTION ORAL at 10:21

## 2021-12-09 RX ADMIN — DIVALPROEX SODIUM 500 MILLIGRAM(S): 500 TABLET, DELAYED RELEASE ORAL at 10:22

## 2021-12-09 NOTE — BH INPATIENT PSYCHIATRY PROGRESS NOTE - NSBHCHARTREVIEWVS_PSY_A_CORE FT
Vital Signs Last 24 Hrs  T(C): --  T(F): --  HR: 99 (12-08-21 @ 21:03) (99 - 99)  BP: 167/83 (12-08-21 @ 21:03) (167/83 - 167/83)  BP(mean): --  RR: --  SpO2: --

## 2021-12-09 NOTE — BH INPATIENT PSYCHIATRY PROGRESS NOTE - NSBHMETABOLIC_PSY_ALL_CORE_FT
BMI: BMI (kg/m2): 24.2 (12-08-21 @ 04:44)  HbA1c: A1C with Estimated Average Glucose Result: 6.0 % (12-09-21 @ 11:05)    Glucose: POCT Blood Glucose.: 145 mg/dL (12-08-21 @ 06:49)    BP: 167/83 (12-08-21 @ 21:03) (167/81 - 186/78)  Lipid Panel: Date/Time: 12-09-21 @ 11:05  Cholesterol, Serum: 133  Direct LDL: --  HDL Cholesterol, Serum: 67  Total Cholesterol/HDL Ration Measurement: --  Triglycerides, Serum: 37

## 2021-12-09 NOTE — BH INPATIENT PSYCHIATRY PROGRESS NOTE - NSBHASSESSSUMMFT_PSY_ALL_CORE
69 yo domiciled ([facility]) F with f HTN, HLD, T2DM, asthma, seizure disorder, Parkinson's disease, glaucoma, PVD, bladder Ca, schizophrenia (with multiple past inpatient hospitalizations, last at [facility] October 2021) hemorrhoids BIBEMS from [facility] for acute change in mental status i/s/o hypotension. Likely medication-induced given negative brain imaging and negative metabolic and infectious work up, now with decompensated schizophrenia. Per inpatient psychiatry notes at [facility], patient was having increasing agitated episodes requiring physical and chemical restraints. Was transferred from [facility] to [facility] inpatient psychiatry unit on 12/7 for further management and concern for psychiatric decompensation. Medicine consulted on 12/7 by inpatient psychiatry team for rash on foot and management of hypertension.  As per today's assessment, patient was seen paranoid, grandiose, irritable, with elevated mood and auditory and visual hallucinations . Patient identifies herself as Jehovah's witness. Patient denies any SI/HI/intent/plan. This presentation seems to be secondary to psychotic and mood decompensation. Will continue patient on Depakote 750 mg ER, and will start patient on Olanzapine 5 mg TID.     Considering starting clozaril. Sister, who is pt's proxy, wants her transferred to a hospital in [place] such as [facility]. Pt both manic and psychotic. Delusional and singing loudly, not sleeping, expansive, grandiose.

## 2021-12-09 NOTE — BH INPATIENT PSYCHIATRY PROGRESS NOTE - CURRENT MEDICATION
MEDICATIONS  (STANDING):  benztropine 0.25 milliGRAM(s) Oral two times a day  carbidopa/levodopa  25/100 1 Tablet(s) Oral three times a day  clotrimazole 1% Cream 1 Application(s) Topical two times a day  diVALproex  milliGRAM(s) Oral daily  diVALproex  milliGRAM(s) Oral at bedtime  latanoprost 0.005% Ophthalmic Solution 1 Drop(s) Both EYES at bedtime  levothyroxine 75 MICROGram(s) Oral daily  lisinopril 10 milliGRAM(s) Oral daily  metoprolol tartrate 25 milliGRAM(s) Oral two times a day  neomycin/bacitracin/polymyxin Topical Ointment 1 Application(s) Topical two times a day  OLANZapine 5 milliGRAM(s) Oral every 8 hours  polyethylene glycol 3350 17 Gram(s) Oral daily  senna 2 Tablet(s) Oral at bedtime    MEDICATIONS  (PRN):  dibucaine 1% Ointment 1 Application(s) Topical two times a day PRN Hemorrhoids  QUEtiapine 50 milliGRAM(s) Oral four times a day PRN agitation

## 2021-12-09 NOTE — BH INPATIENT PSYCHIATRY PROGRESS NOTE - NSBHFUPINTERVALHXFT_PSY_A_CORE
Pt sedated today as she got agitation IMs last night including zyprexa 5 mg IM which had absolutely no effect and ativan 3 mg IM which helped stop the agitation and aggressive behavior but she has been sleeping much of the morning and has slurred speech. Yesterday pt had uterine prolapse and she was convinced she was giving birth. I wonder if pain/excitement due to prolapse may have worsened her agitation.

## 2021-12-10 LAB — GLUCOSE BLDC GLUCOMTR-MCNC: 85 MG/DL — SIGNIFICANT CHANGE UP (ref 70–99)

## 2021-12-10 PROCEDURE — 99232 SBSQ HOSP IP/OBS MODERATE 35: CPT

## 2021-12-10 RX ADMIN — Medication 0.25 MILLIGRAM(S): at 23:06

## 2021-12-10 RX ADMIN — Medication 75 MICROGRAM(S): at 11:28

## 2021-12-10 RX ADMIN — Medication 1 APPLICATION(S): at 23:22

## 2021-12-10 RX ADMIN — Medication 25 MILLIGRAM(S): at 23:05

## 2021-12-10 RX ADMIN — CARBIDOPA AND LEVODOPA 1 TABLET(S): 25; 100 TABLET ORAL at 16:16

## 2021-12-10 RX ADMIN — SENNA PLUS 2 TABLET(S): 8.6 TABLET ORAL at 23:07

## 2021-12-10 RX ADMIN — LISINOPRIL 10 MILLIGRAM(S): 2.5 TABLET ORAL at 11:28

## 2021-12-10 RX ADMIN — BACITRACIN ZINC NEOMYCIN SULFATE POLYMYXIN B SULFATE 1 APPLICATION(S): 400; 3.5; 5 OINTMENT TOPICAL at 23:22

## 2021-12-10 RX ADMIN — CARBIDOPA AND LEVODOPA 1 TABLET(S): 25; 100 TABLET ORAL at 23:06

## 2021-12-10 RX ADMIN — OLANZAPINE 5 MILLIGRAM(S): 15 TABLET, FILM COATED ORAL at 23:05

## 2021-12-10 RX ADMIN — OLANZAPINE 5 MILLIGRAM(S): 15 TABLET, FILM COATED ORAL at 07:51

## 2021-12-10 RX ADMIN — Medication 1 APPLICATION(S): at 11:58

## 2021-12-10 RX ADMIN — DIVALPROEX SODIUM 750 MILLIGRAM(S): 500 TABLET, DELAYED RELEASE ORAL at 23:06

## 2021-12-10 RX ADMIN — DIVALPROEX SODIUM 500 MILLIGRAM(S): 500 TABLET, DELAYED RELEASE ORAL at 11:28

## 2021-12-10 RX ADMIN — POLYETHYLENE GLYCOL 3350 17 GRAM(S): 17 POWDER, FOR SOLUTION ORAL at 12:24

## 2021-12-10 RX ADMIN — Medication 25 MILLIGRAM(S): at 11:28

## 2021-12-10 RX ADMIN — Medication 0.25 MILLIGRAM(S): at 11:29

## 2021-12-10 RX ADMIN — CARBIDOPA AND LEVODOPA 1 TABLET(S): 25; 100 TABLET ORAL at 11:28

## 2021-12-10 RX ADMIN — OLANZAPINE 5 MILLIGRAM(S): 15 TABLET, FILM COATED ORAL at 16:17

## 2021-12-10 RX ADMIN — LATANOPROST 1 DROP(S): 0.05 SOLUTION/ DROPS OPHTHALMIC; TOPICAL at 23:07

## 2021-12-10 NOTE — BH INPATIENT PSYCHIATRY PROGRESS NOTE - NSBHMETABOLIC_PSY_ALL_CORE_FT
BMI: BMI (kg/m2): 24.2 (12-08-21 @ 04:44)  HbA1c: A1C with Estimated Average Glucose Result: 6.0 % (12-09-21 @ 11:05)    Glucose: POCT Blood Glucose.: 85 mg/dL (12-10-21 @ 17:17)    BP: 137/83 (12-10-21 @ 20:10) (127/53 - 169/108)  Lipid Panel: Date/Time: 12-09-21 @ 11:05  Cholesterol, Serum: 133  Direct LDL: --  HDL Cholesterol, Serum: 67  Total Cholesterol/HDL Ration Measurement: --  Triglycerides, Serum: 37

## 2021-12-10 NOTE — BH INPATIENT PSYCHIATRY PROGRESS NOTE - NSBHASSESSSUMMFT_PSY_ALL_CORE
69 yo domiciled ([facility]) F with f HTN, HLD, T2DM, asthma, seizure disorder, Parkinson's disease, glaucoma, PVD, bladder Ca, schizophrenia (with multiple past inpatient hospitalizations, last at [facility] October 2021) hemorrhoids BIBEMS from [facility] for acute change in mental status i/s/o hypotension. Likely medication-induced given negative brain imaging and negative metabolic and infectious work up, now with decompensated schizophrenia. Per inpatient psychiatry notes at [facility], patient was having increasing agitated episodes requiring physical and chemical restraints. Was transferred from [facility] to [facility] inpatient psychiatry unit on 12/7 for further management and concern for psychiatric decompensation. Medicine consulted on 12/7 by inpatient psychiatry team for rash on foot and management of hypertension.  As per today's assessment, patient was seen paranoid, grandiose, irritable, with elevated mood and auditory and visual hallucinations . Patient identifies herself as Oriental orthodox. Patient denies any SI/HI/intent/plan. This presentation seems to be secondary to psychotic and mood decompensation. Will continue patient on Depakote 750 mg ER, and will start patient on Olanzapine 5 mg TID.     Considering starting clozaril. Sister, who is pt's proxy, wants her transferred to a hospital in [place] such as [facility]. Pt both manic and psychotic. Delusional and singing loudly, not sleeping, expansive, grandiose.

## 2021-12-10 NOTE — BH INPATIENT PSYCHIATRY PROGRESS NOTE - NSTXDISORGGOALOTHER_PSY_ALL_CORE
Pt. will participate in groups and milieu treatment structure of the unit
Pt will continue to participate in groups and milieu tx structure of unit

## 2021-12-10 NOTE — BH SOCIAL WORK INITIAL PSYCHOSOCIAL EVALUATION - OTHER PAST PSYCHIATRIC HISTORY (INCLUDE DETAILS REGARDING ONSET, COURSE OF ILLNESS, INPATIENT/OUTPATIENT TREATMENT)
multiple past inpatient hospitalizations, last at [facility] October 2021

## 2021-12-10 NOTE — BH INPATIENT PSYCHIATRY PROGRESS NOTE - NSICDXBHSECONDARYDX_PSY_ALL_CORE
Hypertension   I10  Neurocognitive disorder   R41.9   I have personally seen and examined this patient.  I have fully participated in the care of this patient. I have reviewed all pertinent clinical information, including history, physical exam, plan and the Resident’s note and agree except as noted.

## 2021-12-10 NOTE — BH SOCIAL WORK INITIAL PSYCHOSOCIAL EVALUATION - NSBHTREATHX_PSY_ALL_CORE
Patient was not able to answer most questions. Patient is currently living at a nursing home./Unable to answer (specify)

## 2021-12-10 NOTE — BH SOCIAL WORK INITIAL PSYCHOSOCIAL EVALUATION - NSPTSTATEDGOAL_PSY_ALL_CORE
Patient was not able to state goal. Patient wants to leave [facility] and go to [facility]

## 2021-12-10 NOTE — BH SOCIAL WORK INITIAL PSYCHOSOCIAL EVALUATION - NSBHFINANCE_PSY_ALL_CORE
Reason for Disposition   Patient sounds very sick or weak to the triager    Protocols used: CELLULITIS ON ANTIBIOTIC FOLLOW-UP CALL-ADULT-    Patient called RN access to report that she has been experiencing a skin infection to outer left cheek and inner left cheek. Patient reports she has been seen by multiple providers and has been prescribed 2 different antibiotics. Patient reports she is currently waiting on a culture result, but has not received an update from her doctor. Patient reports continued moderate pain to cheek and new joint pain in right neck and right shoulder. Patient reports pain is constant 6/10 and wakes her from sleep. Patient denies fever, denies shortness of breath. Writer instructed patient to call PCP now or go to ED for evaluation and treatment. Social Security Disability (SSD)

## 2021-12-10 NOTE — BH INPATIENT PSYCHIATRY PROGRESS NOTE - NSBHCHARTREVIEWVS_PSY_A_CORE FT
Vital Signs Last 24 Hrs  T(C): 36.9 (12-10-21 @ 20:10), Max: 36.9 (12-10-21 @ 20:10)  T(F): 98.4 (12-10-21 @ 20:10), Max: 98.4 (12-10-21 @ 20:10)  HR: 71 (12-10-21 @ 20:10) (59 - 71)  BP: 137/83 (12-10-21 @ 20:10) (127/53 - 137/83)  BP(mean): --  RR: 18 (12-10-21 @ 20:10) (16 - 18)  SpO2: 99% (12-10-21 @ 20:10) (99% - 100%)

## 2021-12-11 LAB
ALBUMIN SERPL ELPH-MCNC: 3.3 G/DL — SIGNIFICANT CHANGE UP (ref 3.3–5)
ALP SERPL-CCNC: 99 U/L — SIGNIFICANT CHANGE UP (ref 40–120)
ALT FLD-CCNC: 8 U/L — LOW (ref 10–45)
ANION GAP SERPL CALC-SCNC: 7 MMOL/L — SIGNIFICANT CHANGE UP (ref 5–17)
AST SERPL-CCNC: 19 U/L — SIGNIFICANT CHANGE UP (ref 10–40)
BILIRUB SERPL-MCNC: <0.2 MG/DL — SIGNIFICANT CHANGE UP (ref 0.2–1.2)
BUN SERPL-MCNC: 25 MG/DL — HIGH (ref 7–23)
CALCIUM SERPL-MCNC: 9.3 MG/DL — SIGNIFICANT CHANGE UP (ref 8.4–10.5)
CHLORIDE SERPL-SCNC: 113 MMOL/L — HIGH (ref 96–108)
CO2 SERPL-SCNC: 26 MMOL/L — SIGNIFICANT CHANGE UP (ref 22–31)
CREAT SERPL-MCNC: 0.84 MG/DL — SIGNIFICANT CHANGE UP (ref 0.5–1.3)
GLUCOSE BLDC GLUCOMTR-MCNC: 105 MG/DL — HIGH (ref 70–99)
GLUCOSE BLDC GLUCOMTR-MCNC: 145 MG/DL — HIGH (ref 70–99)
GLUCOSE BLDC GLUCOMTR-MCNC: 167 MG/DL — HIGH (ref 70–99)
GLUCOSE SERPL-MCNC: 155 MG/DL — HIGH (ref 70–99)
HCT VFR BLD CALC: 34.7 % — SIGNIFICANT CHANGE UP (ref 34.5–45)
HGB BLD-MCNC: 10.6 G/DL — LOW (ref 11.5–15.5)
MCHC RBC-ENTMCNC: 27 PG — SIGNIFICANT CHANGE UP (ref 27–34)
MCHC RBC-ENTMCNC: 30.5 GM/DL — LOW (ref 32–36)
MCV RBC AUTO: 88.3 FL — SIGNIFICANT CHANGE UP (ref 80–100)
NRBC # BLD: 0 /100 WBCS — SIGNIFICANT CHANGE UP (ref 0–0)
PLATELET # BLD AUTO: 202 K/UL — SIGNIFICANT CHANGE UP (ref 150–400)
POTASSIUM SERPL-MCNC: 4.9 MMOL/L — SIGNIFICANT CHANGE UP (ref 3.5–5.3)
POTASSIUM SERPL-SCNC: 4.9 MMOL/L — SIGNIFICANT CHANGE UP (ref 3.5–5.3)
PROT SERPL-MCNC: 6.5 G/DL — SIGNIFICANT CHANGE UP (ref 6–8.3)
RBC # BLD: 3.93 M/UL — SIGNIFICANT CHANGE UP (ref 3.8–5.2)
RBC # FLD: 15.5 % — HIGH (ref 10.3–14.5)
SODIUM SERPL-SCNC: 146 MMOL/L — HIGH (ref 135–145)
WBC # BLD: 6.05 K/UL — SIGNIFICANT CHANGE UP (ref 3.8–10.5)
WBC # FLD AUTO: 6.05 K/UL — SIGNIFICANT CHANGE UP (ref 3.8–10.5)

## 2021-12-11 RX ORDER — OLANZAPINE 15 MG/1
5 TABLET, FILM COATED ORAL ONCE
Refills: 0 | Status: COMPLETED | OUTPATIENT
Start: 2021-12-11 | End: 2021-12-11

## 2021-12-11 RX ADMIN — Medication 1 APPLICATION(S): at 09:14

## 2021-12-11 RX ADMIN — Medication 0.25 MILLIGRAM(S): at 09:16

## 2021-12-11 RX ADMIN — DIVALPROEX SODIUM 500 MILLIGRAM(S): 500 TABLET, DELAYED RELEASE ORAL at 09:15

## 2021-12-11 RX ADMIN — BACITRACIN ZINC NEOMYCIN SULFATE POLYMYXIN B SULFATE 1 APPLICATION(S): 400; 3.5; 5 OINTMENT TOPICAL at 12:52

## 2021-12-11 RX ADMIN — Medication 1 MILLIGRAM(S): at 02:50

## 2021-12-11 RX ADMIN — Medication 25 MILLIGRAM(S): at 09:15

## 2021-12-11 RX ADMIN — DIVALPROEX SODIUM 750 MILLIGRAM(S): 500 TABLET, DELAYED RELEASE ORAL at 22:08

## 2021-12-11 RX ADMIN — Medication 75 MICROGRAM(S): at 09:16

## 2021-12-11 RX ADMIN — CARBIDOPA AND LEVODOPA 1 TABLET(S): 25; 100 TABLET ORAL at 12:52

## 2021-12-11 RX ADMIN — LISINOPRIL 10 MILLIGRAM(S): 2.5 TABLET ORAL at 09:15

## 2021-12-11 RX ADMIN — POLYETHYLENE GLYCOL 3350 17 GRAM(S): 17 POWDER, FOR SOLUTION ORAL at 09:14

## 2021-12-11 RX ADMIN — BACITRACIN ZINC NEOMYCIN SULFATE POLYMYXIN B SULFATE 1 APPLICATION(S): 400; 3.5; 5 OINTMENT TOPICAL at 22:08

## 2021-12-11 RX ADMIN — CARBIDOPA AND LEVODOPA 1 TABLET(S): 25; 100 TABLET ORAL at 09:15

## 2021-12-11 RX ADMIN — LATANOPROST 1 DROP(S): 0.05 SOLUTION/ DROPS OPHTHALMIC; TOPICAL at 22:08

## 2021-12-11 RX ADMIN — QUETIAPINE FUMARATE 50 MILLIGRAM(S): 200 TABLET, FILM COATED ORAL at 02:08

## 2021-12-11 RX ADMIN — OLANZAPINE 5 MILLIGRAM(S): 15 TABLET, FILM COATED ORAL at 13:43

## 2021-12-11 RX ADMIN — OLANZAPINE 5 MILLIGRAM(S): 15 TABLET, FILM COATED ORAL at 07:12

## 2021-12-11 RX ADMIN — QUETIAPINE FUMARATE 50 MILLIGRAM(S): 200 TABLET, FILM COATED ORAL at 09:17

## 2021-12-11 RX ADMIN — OLANZAPINE 5 MILLIGRAM(S): 15 TABLET, FILM COATED ORAL at 22:20

## 2021-12-11 RX ADMIN — Medication 1 APPLICATION(S): at 22:08

## 2021-12-11 NOTE — CHART NOTE - NSCHARTNOTEFT_GEN_A_CORE
Patient was agitated, disorganized, spitting out medications. Given recent history of severe agitation which is distruptive to the unit and potentially dangerous given her uterine prolapse, I placed an order for olanzapine 5mg IM stat for psychosis.

## 2021-12-11 NOTE — BH INPATIENT PSYCHIATRY PROGRESS NOTE - NSBHMETABOLIC_PSY_ALL_CORE_FT
BMI: BMI (kg/m2): 24.2 (12-08-21 @ 04:44)  HbA1c: A1C with Estimated Average Glucose Result: 6.0 % (12-09-21 @ 11:05)    Glucose: POCT Blood Glucose.: 105 mg/dL (12-11-21 @ 16:39)    BP: 141/88 (12-11-21 @ 17:06) (127/53 - 167/83)  Lipid Panel: Date/Time: 12-09-21 @ 11:05  Cholesterol, Serum: 133  Direct LDL: --  HDL Cholesterol, Serum: 67  Total Cholesterol/HDL Ration Measurement: --  Triglycerides, Serum: 37

## 2021-12-11 NOTE — BH INPATIENT PSYCHIATRY PROGRESS NOTE - NSBHCHARTREVIEWVS_PSY_A_CORE FT
Vital Signs Last 24 Hrs  T(C): 36.8 (12-11-21 @ 17:06), Max: 36.9 (12-10-21 @ 20:10)  T(F): 98.2 (12-11-21 @ 17:06), Max: 98.4 (12-10-21 @ 20:10)  HR: 58 (12-11-21 @ 17:06) (58 - 74)  BP: 141/88 (12-11-21 @ 17:06) (137/83 - 147/94)  BP(mean): --  RR: 16 (12-11-21 @ 17:06) (16 - 18)  SpO2: 100% (12-11-21 @ 17:06) (99% - 100%)

## 2021-12-11 NOTE — BH INPATIENT PSYCHIATRY PROGRESS NOTE - NSBHFUPINTERVALHXFT_PSY_A_CORE
The patient has disorganized thought process and as such interview is limited. She denies side effects from medications. Per staff she has been less agitated than yesterday but did get up once wanting to fight the staff. She tells me that she doesn't know who the nurses are, asking me to close the door and speaking quietly. She believes it is the year 2001, but is oriented to place.

## 2021-12-11 NOTE — BH INPATIENT PSYCHIATRY PROGRESS NOTE - NSBHASSESSSUMMFT_PSY_ALL_CORE
70W, HTN, HLD, T2DM, asthma, seizure disorder, Parkinson's disease, glaucoma, PVD, bladder Ca, schizophrenia (with multiple past inpatient hospitalizations, last at [facility] October 2021) hemorrhoids BIBEMS from [facility] for acute change in mental status i/s/o hypotension. Currently with decompensated schizophrenia, with episodes of agitation, recently paranoid, grandiose, irritable, with elevated mood and auditory and visual hallucinations. She is not oriented to time. She is being treated with olanzapine 5mg TID and Depakote 750mg ER with no side effects thus far, and decreased agitation compared to yesterday. No change to prior plan.

## 2021-12-12 LAB
GLUCOSE BLDC GLUCOMTR-MCNC: 117 MG/DL — HIGH (ref 70–99)
GLUCOSE BLDC GLUCOMTR-MCNC: 122 MG/DL — HIGH (ref 70–99)

## 2021-12-12 RX ADMIN — Medication 1 MILLIGRAM(S): at 00:05

## 2021-12-12 RX ADMIN — Medication 25 MILLIGRAM(S): at 21:15

## 2021-12-12 RX ADMIN — POLYETHYLENE GLYCOL 3350 17 GRAM(S): 17 POWDER, FOR SOLUTION ORAL at 10:52

## 2021-12-12 RX ADMIN — CARBIDOPA AND LEVODOPA 1 TABLET(S): 25; 100 TABLET ORAL at 21:14

## 2021-12-12 RX ADMIN — CARBIDOPA AND LEVODOPA 1 TABLET(S): 25; 100 TABLET ORAL at 10:26

## 2021-12-12 RX ADMIN — CARBIDOPA AND LEVODOPA 1 TABLET(S): 25; 100 TABLET ORAL at 13:31

## 2021-12-12 RX ADMIN — Medication 1 APPLICATION(S): at 21:15

## 2021-12-12 RX ADMIN — Medication 1 APPLICATION(S): at 10:35

## 2021-12-12 RX ADMIN — LISINOPRIL 10 MILLIGRAM(S): 2.5 TABLET ORAL at 10:26

## 2021-12-12 RX ADMIN — Medication 75 MICROGRAM(S): at 10:26

## 2021-12-12 RX ADMIN — BACITRACIN ZINC NEOMYCIN SULFATE POLYMYXIN B SULFATE 1 APPLICATION(S): 400; 3.5; 5 OINTMENT TOPICAL at 10:53

## 2021-12-12 RX ADMIN — OLANZAPINE 5 MILLIGRAM(S): 15 TABLET, FILM COATED ORAL at 13:31

## 2021-12-12 RX ADMIN — SENNA PLUS 2 TABLET(S): 8.6 TABLET ORAL at 21:13

## 2021-12-12 RX ADMIN — OLANZAPINE 5 MILLIGRAM(S): 15 TABLET, FILM COATED ORAL at 21:14

## 2021-12-12 RX ADMIN — Medication 0.25 MILLIGRAM(S): at 21:15

## 2021-12-12 RX ADMIN — BACITRACIN ZINC NEOMYCIN SULFATE POLYMYXIN B SULFATE 1 APPLICATION(S): 400; 3.5; 5 OINTMENT TOPICAL at 22:41

## 2021-12-12 RX ADMIN — Medication 25 MILLIGRAM(S): at 10:26

## 2021-12-12 RX ADMIN — Medication 0.25 MILLIGRAM(S): at 10:26

## 2021-12-12 RX ADMIN — DIVALPROEX SODIUM 750 MILLIGRAM(S): 500 TABLET, DELAYED RELEASE ORAL at 21:13

## 2021-12-12 RX ADMIN — LATANOPROST 1 DROP(S): 0.05 SOLUTION/ DROPS OPHTHALMIC; TOPICAL at 21:13

## 2021-12-12 RX ADMIN — DIVALPROEX SODIUM 500 MILLIGRAM(S): 500 TABLET, DELAYED RELEASE ORAL at 10:26

## 2021-12-12 RX ADMIN — QUETIAPINE FUMARATE 50 MILLIGRAM(S): 200 TABLET, FILM COATED ORAL at 21:15

## 2021-12-12 NOTE — BH INPATIENT PSYCHIATRY PROGRESS NOTE - NSBHCHARTREVIEWVS_PSY_A_CORE FT
Vital Signs Last 24 Hrs  T(C): 36.8 (12-11-21 @ 20:41), Max: 36.8 (12-11-21 @ 17:06)  T(F): 98.2 (12-11-21 @ 20:41), Max: 98.2 (12-11-21 @ 17:06)  HR: 67 (12-11-21 @ 20:41) (58 - 67)  BP: 145/91 (12-11-21 @ 20:41) (141/88 - 145/91)  BP(mean): --  RR: 16 (12-11-21 @ 20:41) (16 - 16)  SpO2: 100% (12-11-21 @ 20:41) (100% - 100%)

## 2021-12-12 NOTE — BH INPATIENT PSYCHIATRY PROGRESS NOTE - NSBHASSESSSUMMFT_PSY_ALL_CORE
70W, HTN, HLD, T2DM, asthma, seizure disorder, Parkinson's disease, glaucoma, PVD, bladder Ca, schizophrenia (with multiple past inpatient hospitalizations, last at [facility] October 2021) hemorrhoids BIBEMS from [facility] for acute change in mental status i/s/o hypotension. Currently with decompensated schizophrenia, with episodes of agitation, recently paranoid, grandiose, irritable, with elevated mood and auditory and visual hallucinations. She is being treated with olanzapine 5mg TID and Depakote 750mg ER with no side effects thus far, and decreased agitation compared to yesterday. Ativan 1mg PRN seems to be effective for agitation. No change to prior plan.

## 2021-12-12 NOTE — BH INPATIENT PSYCHIATRY PROGRESS NOTE - NSBHFUPINTERVALHXFT_PSY_A_CORE
Patient was sleeping comfortably in bed, received IM Zyprexa 5mg and Ativan 1mg PO overnight for agitation. She did not engage on interview this morning and upon awakening, mumbled and wished to remain asleep. Will continue to monitor.

## 2021-12-12 NOTE — BH INPATIENT PSYCHIATRY PROGRESS NOTE - NSBHMETABOLIC_PSY_ALL_CORE_FT
BMI: BMI (kg/m2): 24.2 (12-08-21 @ 04:44)  HbA1c: A1C with Estimated Average Glucose Result: 6.0 % (12-09-21 @ 11:05)    Glucose: POCT Blood Glucose.: 105 mg/dL (12-11-21 @ 16:39)    BP: 145/91 (12-11-21 @ 20:41) (127/53 - 150/87)  Lipid Panel: Date/Time: 12-09-21 @ 11:05  Cholesterol, Serum: 133  Direct LDL: --  HDL Cholesterol, Serum: 67  Total Cholesterol/HDL Ration Measurement: --  Triglycerides, Serum: 37

## 2021-12-12 NOTE — BH INPATIENT PSYCHIATRY PROGRESS NOTE - NSICDXBHSECONDARYDX_PSY_ALL_CORE
Xerosis of skin   L85.3  Hypertension   I10  Neurocognitive disorder   R41.9  Neurocognitive disorder   R41.9

## 2021-12-13 LAB
ALBUMIN SERPL ELPH-MCNC: 3.2 G/DL — LOW (ref 3.3–5)
ALP SERPL-CCNC: 91 U/L — SIGNIFICANT CHANGE UP (ref 40–120)
ALT FLD-CCNC: <5 U/L — LOW (ref 10–45)
AMMONIA BLD-MCNC: 28 UMOL/L — SIGNIFICANT CHANGE UP (ref 11–55)
ANION GAP SERPL CALC-SCNC: 8 MMOL/L — SIGNIFICANT CHANGE UP (ref 5–17)
AST SERPL-CCNC: 19 U/L — SIGNIFICANT CHANGE UP (ref 10–40)
BILIRUB SERPL-MCNC: <0.2 MG/DL — SIGNIFICANT CHANGE UP (ref 0.2–1.2)
BUN SERPL-MCNC: 22 MG/DL — SIGNIFICANT CHANGE UP (ref 7–23)
CALCIUM SERPL-MCNC: 9.1 MG/DL — SIGNIFICANT CHANGE UP (ref 8.4–10.5)
CHLORIDE SERPL-SCNC: 111 MMOL/L — HIGH (ref 96–108)
CO2 SERPL-SCNC: 25 MMOL/L — SIGNIFICANT CHANGE UP (ref 22–31)
CREAT SERPL-MCNC: 0.67 MG/DL — SIGNIFICANT CHANGE UP (ref 0.5–1.3)
GLUCOSE BLDC GLUCOMTR-MCNC: 97 MG/DL — SIGNIFICANT CHANGE UP (ref 70–99)
GLUCOSE SERPL-MCNC: 129 MG/DL — HIGH (ref 70–99)
HCT VFR BLD CALC: 34.3 % — LOW (ref 34.5–45)
HGB BLD-MCNC: 10.6 G/DL — LOW (ref 11.5–15.5)
MCHC RBC-ENTMCNC: 27.7 PG — SIGNIFICANT CHANGE UP (ref 27–34)
MCHC RBC-ENTMCNC: 30.9 GM/DL — LOW (ref 32–36)
MCV RBC AUTO: 89.8 FL — SIGNIFICANT CHANGE UP (ref 80–100)
NRBC # BLD: 0 /100 WBCS — SIGNIFICANT CHANGE UP (ref 0–0)
PLATELET # BLD AUTO: 193 K/UL — SIGNIFICANT CHANGE UP (ref 150–400)
POTASSIUM SERPL-MCNC: 4.9 MMOL/L — SIGNIFICANT CHANGE UP (ref 3.5–5.3)
POTASSIUM SERPL-SCNC: 4.9 MMOL/L — SIGNIFICANT CHANGE UP (ref 3.5–5.3)
PROT SERPL-MCNC: 6.4 G/DL — SIGNIFICANT CHANGE UP (ref 6–8.3)
RBC # BLD: 3.82 M/UL — SIGNIFICANT CHANGE UP (ref 3.8–5.2)
RBC # FLD: 16.2 % — HIGH (ref 10.3–14.5)
SODIUM SERPL-SCNC: 144 MMOL/L — SIGNIFICANT CHANGE UP (ref 135–145)
TSH SERPL-MCNC: 3.15 UIU/ML — SIGNIFICANT CHANGE UP (ref 0.27–4.2)
VALPROATE SERPL-MCNC: 74.9 UG/ML — SIGNIFICANT CHANGE UP (ref 50–100)
WBC # BLD: 4.73 K/UL — SIGNIFICANT CHANGE UP (ref 3.8–10.5)
WBC # FLD AUTO: 4.73 K/UL — SIGNIFICANT CHANGE UP (ref 3.8–10.5)

## 2021-12-13 RX ADMIN — Medication 25 MILLIGRAM(S): at 10:16

## 2021-12-13 RX ADMIN — OLANZAPINE 5 MILLIGRAM(S): 15 TABLET, FILM COATED ORAL at 22:22

## 2021-12-13 RX ADMIN — DIVALPROEX SODIUM 500 MILLIGRAM(S): 500 TABLET, DELAYED RELEASE ORAL at 11:01

## 2021-12-13 RX ADMIN — Medication 1 APPLICATION(S): at 11:15

## 2021-12-13 RX ADMIN — OLANZAPINE 5 MILLIGRAM(S): 15 TABLET, FILM COATED ORAL at 07:24

## 2021-12-13 RX ADMIN — Medication 0.25 MILLIGRAM(S): at 22:27

## 2021-12-13 RX ADMIN — BACITRACIN ZINC NEOMYCIN SULFATE POLYMYXIN B SULFATE 1 APPLICATION(S): 400; 3.5; 5 OINTMENT TOPICAL at 19:45

## 2021-12-13 RX ADMIN — LISINOPRIL 10 MILLIGRAM(S): 2.5 TABLET ORAL at 10:17

## 2021-12-13 RX ADMIN — SENNA PLUS 2 TABLET(S): 8.6 TABLET ORAL at 22:19

## 2021-12-13 RX ADMIN — CARBIDOPA AND LEVODOPA 1 TABLET(S): 25; 100 TABLET ORAL at 11:01

## 2021-12-13 RX ADMIN — Medication 25 MILLIGRAM(S): at 09:01

## 2021-12-13 RX ADMIN — Medication 0.25 MILLIGRAM(S): at 11:00

## 2021-12-13 RX ADMIN — Medication 25 MILLIGRAM(S): at 22:23

## 2021-12-13 RX ADMIN — CARBIDOPA AND LEVODOPA 1 TABLET(S): 25; 100 TABLET ORAL at 22:23

## 2021-12-13 RX ADMIN — BACITRACIN ZINC NEOMYCIN SULFATE POLYMYXIN B SULFATE 1 APPLICATION(S): 400; 3.5; 5 OINTMENT TOPICAL at 10:18

## 2021-12-13 RX ADMIN — BACITRACIN ZINC NEOMYCIN SULFATE POLYMYXIN B SULFATE 1 APPLICATION(S): 400; 3.5; 5 OINTMENT TOPICAL at 22:23

## 2021-12-13 RX ADMIN — Medication 1 APPLICATION(S): at 22:28

## 2021-12-13 RX ADMIN — Medication 75 MICROGRAM(S): at 07:25

## 2021-12-13 RX ADMIN — LATANOPROST 1 DROP(S): 0.05 SOLUTION/ DROPS OPHTHALMIC; TOPICAL at 22:23

## 2021-12-13 NOTE — BH INPATIENT PSYCHIATRY PROGRESS NOTE - NSBHMETABOLIC_PSY_ALL_CORE_FT
BMI: BMI (kg/m2): 24.2 (12-08-21 @ 04:44)  HbA1c: A1C with Estimated Average Glucose Result: 6.0 % (12-09-21 @ 11:05)    Glucose: POCT Blood Glucose.: 97 mg/dL (12-13-21 @ 07:28)    BP: 115/71 (12-13-21 @ 09:10) (115/71 - 167/79)  Lipid Panel: Date/Time: 12-09-21 @ 11:05  Cholesterol, Serum: 133  Direct LDL: --  HDL Cholesterol, Serum: 67  Total Cholesterol/HDL Ration Measurement: --  Triglycerides, Serum: 37

## 2021-12-13 NOTE — BH INPATIENT PSYCHIATRY PROGRESS NOTE - NSBHASSESSSUMMFT_PSY_ALL_CORE
70W, HTN, HLD, T2DM, asthma, seizure disorder, Parkinson's disease, glaucoma, PVD, bladder Ca, schizophrenia (with multiple past inpatient hospitalizations, last at [facility] October 2021) hemorrhoids BIBEMS from [facility] for acute change in mental status i/s/o hypotension. Currently with decompensated schizophrenia, with episodes of agitation, recently paranoid, grandiose, irritable, with elevated mood and auditory and visual hallucinations.     She is being treated with olanzapine 5mg TID and Depakote 750mg ER with no side effects thus far, and decreased agitation. Ativan 1mg PRN seems to be effective for agitation. Patient is seen less attentive and less verbal than before. CBC CMP Valproic acid level Serum Ammonia pending.  70W, HTN, HLD, T2DM, asthma, seizure disorder, Parkinson's disease, glaucoma, PVD, bladder Ca, schizophrenia (with multiple past inpatient hospitalizations, last at [facility] October 2021) hemorrhoids BIBEMS from [facility] for acute change in mental status i/s/o hypotension. Currently with decompensated schizophrenia, with episodes of agitation, recently paranoid, grandiose, irritable, with elevated mood and auditory and visual hallucinations. She is being treated with olanzapine 5mg TID and Depakote 750mg ER with no side effects thus far, and decreased agitation. Ativan 1mg PRN seems to be effective for agitation.     12/13/2021 Patient is seen less attentive and less verbal than before, sleepy but easy to arouse, CBC CMP WNL Valproic acid level 74.9 Ammonia 28. Will continue Olanzapine 5 mg TID, Depakote 500 mg AM + 750 MG QHS, Benztropine 0.25 mg BID, and Seroquel 50 mg Q6 PRN for agitation. Will consider start patient on Clozapine when she is less sleepy.

## 2021-12-13 NOTE — BH INPATIENT PSYCHIATRY PROGRESS NOTE - NSBHCHARTREVIEWVS_PSY_A_CORE FT
Vital Signs Last 24 Hrs  T(C): 36.6 (12-12-21 @ 17:15), Max: 36.6 (12-12-21 @ 17:15)  T(F): 97.8 (12-12-21 @ 17:15), Max: 97.8 (12-12-21 @ 17:15)  HR: 68 (12-13-21 @ 09:10) (58 - 68)  BP: 115/71 (12-13-21 @ 09:10) (115/71 - 146/92)  BP(mean): --  RR: 18 (12-13-21 @ 09:10) (18 - 18)  SpO2: 100% (12-13-21 @ 09:10) (100% - 100%)

## 2021-12-13 NOTE — BH INPATIENT PSYCHIATRY PROGRESS NOTE - NSBHFUPINTERVALHXFT_PSY_A_CORE
Patient was seen sleeping comfortably on bed, was easy to arouse, patient is incoherent, disorganized, has staring look, asked writer for water, she spited some of it on the floor, later tried to clean it. When patient asked to walk outside her room with writer, she had a staring look, seen unsteady, was able to walk few steps with the 1:1 RN holding her hand. As per 1:1 patient is eating, go to bathroom, incoherent, and less verbal today. CBC CMP Valproic acid level Serum Ammonia pending. Patient is taking her medications intermittently.

## 2021-12-14 LAB
GLUCOSE BLDC GLUCOMTR-MCNC: 131 MG/DL — HIGH (ref 70–99)
GLUCOSE BLDC GLUCOMTR-MCNC: 62 MG/DL — LOW (ref 70–99)
SARS-COV-2 RNA SPEC QL NAA+PROBE: SIGNIFICANT CHANGE UP
VALPROATE FREE SERPL-MCNC: 8.9 UG/ML — SIGNIFICANT CHANGE UP (ref 6–22)

## 2021-12-14 PROCEDURE — 99232 SBSQ HOSP IP/OBS MODERATE 35: CPT

## 2021-12-14 RX ORDER — OLANZAPINE 15 MG/1
5 TABLET, FILM COATED ORAL ONCE
Refills: 0 | Status: COMPLETED | OUTPATIENT
Start: 2021-12-13 | End: 2021-12-14

## 2021-12-14 RX ORDER — OLANZAPINE 15 MG/1
5 TABLET, FILM COATED ORAL DAILY
Refills: 0 | Status: DISCONTINUED | OUTPATIENT
Start: 2021-12-14 | End: 2021-12-17

## 2021-12-14 RX ORDER — OLANZAPINE 15 MG/1
5 TABLET, FILM COATED ORAL AT BEDTIME
Refills: 0 | Status: DISCONTINUED | OUTPATIENT
Start: 2021-12-14 | End: 2021-12-17

## 2021-12-14 RX ORDER — OLANZAPINE 15 MG/1
5 TABLET, FILM COATED ORAL ONCE
Refills: 0 | Status: COMPLETED | OUTPATIENT
Start: 2021-12-14 | End: 2021-12-14

## 2021-12-14 RX ADMIN — Medication 25 MILLIGRAM(S): at 22:32

## 2021-12-14 RX ADMIN — Medication 1 APPLICATION(S): at 22:31

## 2021-12-14 RX ADMIN — Medication 75 MICROGRAM(S): at 06:03

## 2021-12-14 RX ADMIN — OLANZAPINE 5 MILLIGRAM(S): 15 TABLET, FILM COATED ORAL at 23:50

## 2021-12-14 RX ADMIN — LATANOPROST 1 DROP(S): 0.05 SOLUTION/ DROPS OPHTHALMIC; TOPICAL at 22:31

## 2021-12-14 RX ADMIN — QUETIAPINE FUMARATE 50 MILLIGRAM(S): 200 TABLET, FILM COATED ORAL at 15:07

## 2021-12-14 RX ADMIN — Medication 0.25 MILLIGRAM(S): at 22:31

## 2021-12-14 RX ADMIN — DIVALPROEX SODIUM 750 MILLIGRAM(S): 500 TABLET, DELAYED RELEASE ORAL at 22:33

## 2021-12-14 RX ADMIN — CARBIDOPA AND LEVODOPA 1 TABLET(S): 25; 100 TABLET ORAL at 13:53

## 2021-12-14 RX ADMIN — OLANZAPINE 10 MILLIGRAM(S): 15 TABLET, FILM COATED ORAL at 22:32

## 2021-12-14 RX ADMIN — CARBIDOPA AND LEVODOPA 1 TABLET(S): 25; 100 TABLET ORAL at 22:33

## 2021-12-14 RX ADMIN — QUETIAPINE FUMARATE 50 MILLIGRAM(S): 200 TABLET, FILM COATED ORAL at 22:32

## 2021-12-14 RX ADMIN — OLANZAPINE 5 MILLIGRAM(S): 15 TABLET, FILM COATED ORAL at 00:32

## 2021-12-14 RX ADMIN — BACITRACIN ZINC NEOMYCIN SULFATE POLYMYXIN B SULFATE 1 APPLICATION(S): 400; 3.5; 5 OINTMENT TOPICAL at 22:31

## 2021-12-14 RX ADMIN — SENNA PLUS 2 TABLET(S): 8.6 TABLET ORAL at 22:32

## 2021-12-14 RX ADMIN — OLANZAPINE 5 MILLIGRAM(S): 15 TABLET, FILM COATED ORAL at 06:03

## 2021-12-14 RX ADMIN — BACITRACIN ZINC NEOMYCIN SULFATE POLYMYXIN B SULFATE 1 APPLICATION(S): 400; 3.5; 5 OINTMENT TOPICAL at 12:34

## 2021-12-14 NOTE — BH INPATIENT PSYCHIATRY PROGRESS NOTE - CURRENT MEDICATION
MEDICATIONS  (STANDING):  benztropine 0.25 milliGRAM(s) Oral two times a day  carbidopa/levodopa  25/100 1 Tablet(s) Oral three times a day  clotrimazole 1% Cream 1 Application(s) Topical two times a day  diVALproex  milliGRAM(s) Oral daily  diVALproex  milliGRAM(s) Oral at bedtime  latanoprost 0.005% Ophthalmic Solution 1 Drop(s) Both EYES at bedtime  levothyroxine 75 MICROGram(s) Oral daily  lisinopril 10 milliGRAM(s) Oral daily  metoprolol tartrate 25 milliGRAM(s) Oral two times a day  neomycin/bacitracin/polymyxin Topical Ointment 1 Application(s) Topical two times a day  OLANZapine 5 milliGRAM(s) Oral daily  OLANZapine 10 milliGRAM(s) Oral at bedtime  polyethylene glycol 3350 17 Gram(s) Oral daily  senna 2 Tablet(s) Oral at bedtime    MEDICATIONS  (PRN):  dibucaine 1% Ointment 1 Application(s) Topical two times a day PRN Hemorrhoids  QUEtiapine 50 milliGRAM(s) Oral four times a day PRN agitation

## 2021-12-14 NOTE — BH INPATIENT PSYCHIATRY PROGRESS NOTE - NSBHMSESPEECH_PSY_A_CORE
Non-verbal: unable to assess speech further Normal volume, rate, productivity, spontaneity and articulation

## 2021-12-14 NOTE — BH INPATIENT PSYCHIATRY PROGRESS NOTE - NSBHMETABOLIC_PSY_ALL_CORE_FT
BMI: BMI (kg/m2): 24.2 (12-08-21 @ 04:44)  HbA1c: A1C with Estimated Average Glucose Result: 6.0 % (12-09-21 @ 11:05)    Glucose: POCT Blood Glucose.: 131 mg/dL (12-14-21 @ 07:48)    BP: 116/67 (12-14-21 @ 10:41) (115/71 - 167/79)  Lipid Panel: Date/Time: 12-09-21 @ 11:05  Cholesterol, Serum: 133  Direct LDL: --  HDL Cholesterol, Serum: 67  Total Cholesterol/HDL Ration Measurement: --  Triglycerides, Serum: 37

## 2021-12-14 NOTE — BH INPATIENT PSYCHIATRY PROGRESS NOTE - NSBHCHARTREVIEWVS_PSY_A_CORE FT
Vital Signs Last 24 Hrs  T(C): 37.1 (12-14-21 @ 10:41), Max: 37.1 (12-14-21 @ 10:41)  T(F): 98.7 (12-14-21 @ 10:41), Max: 98.7 (12-14-21 @ 10:41)  HR: 77 (12-14-21 @ 10:41) (74 - 82)  BP: 116/67 (12-14-21 @ 10:41) (116/67 - 144/89)  BP(mean): --  RR: 18 (12-13-21 @ 20:22) (18 - 18)  SpO2: 100% (12-13-21 @ 20:22) (97% - 100%)     Vital Signs Last 24 Hrs  T(C): 37.1 (12-14-21 @ 10:41), Max: 37.1 (12-14-21 @ 10:41)  T(F): 98.7 (12-14-21 @ 10:41), Max: 98.7 (12-14-21 @ 10:41)  HR: 77 (12-14-21 @ 10:41) (74 - 77)  BP: 116/67 (12-14-21 @ 10:41) (116/67 - 144/89)  BP(mean): --  RR: 18 (12-13-21 @ 20:22) (18 - 18)  SpO2: 100% (12-13-21 @ 20:22) (100% - 100%)

## 2021-12-14 NOTE — BH INPATIENT PSYCHIATRY PROGRESS NOTE - NSBHFUPINTERVALHXFT_PSY_A_CORE
Patient was seen at bedside, oriented to time (6 morning), place (hospital), person (doctor). Patient received Olanzapine 5 mg IM for agitation yesterday evening, with good response. Will change Olanzapine from 5 mg TID to 5 mg AM and 10 mg PM. Patient was seen staring at the interviewer, asked interviewer to leave room, as she wants to rest. Patient continues to be uncooperative and disorganized, on constant observation 1:1.  Patient is taking her medications intermittently.

## 2021-12-14 NOTE — BH TREATMENT PLAN - NSTXDISORGINTERRN_PSY_ALL_CORE
Encourage patient to adhere with prescribed medications and treatment. Redirect, set limits, and offer PRNs as needed. Provide a safe and therapeutic environment.

## 2021-12-14 NOTE — BH INPATIENT PSYCHIATRY PROGRESS NOTE - NSBHASSESSSUMMFT_PSY_ALL_CORE
70W, HTN, HLD, T2DM, asthma, seizure disorder, Parkinson's disease, glaucoma, PVD, bladder Ca, schizophrenia (with multiple past inpatient hospitalizations, last at [facility] October 2021) hemorrhoids BIBEMS from [facility] for acute change in mental status i/s/o hypotension. Currently with decompensated schizophrenia, with episodes of agitation, recently paranoid, grandiose, irritable, with elevated mood and auditory and visual hallucinations. She is being treated with olanzapine 5mg TID and Depakote 750mg ER with no side effects thus far, and decreased agitation. Ativan 1mg PRN seems to be effective for agitation.     12/13/2021 Patient is seen less attentive and less verbal than before, sleepy but easy to arouse, CBC CMP WNL Valproic acid level 74.9 Ammonia 28. Will continue Olanzapine 5 mg TID, Depakote 500 mg AM + 750 MG QHS, Benztropine 0.25 mg BID, and Seroquel 50 mg Q6 PRN for agitation. Will consider start patient on Clozapine when she is less sleepy.     12/14/2021 Patient continues to be disorganized, uncooperative, at times agitated, continues to need PRN meds. Will change olanzapine dose from 5 mg TID to 5 mg AM and 10 mg PM. Will continue Depakote 500 mg AM + 750 MG QHS, Benztropine 0.25 mg BID, and Seroquel 50 mg Q6 PRN for agitation.

## 2021-12-15 RX ORDER — CLOZAPINE 150 MG/1
25 TABLET, ORALLY DISINTEGRATING ORAL AT BEDTIME
Refills: 0 | Status: DISCONTINUED | OUTPATIENT
Start: 2021-12-15 | End: 2021-12-17

## 2021-12-15 RX ADMIN — LATANOPROST 1 DROP(S): 0.05 SOLUTION/ DROPS OPHTHALMIC; TOPICAL at 22:22

## 2021-12-15 RX ADMIN — CLOZAPINE 25 MILLIGRAM(S): 150 TABLET, ORALLY DISINTEGRATING ORAL at 23:10

## 2021-12-15 RX ADMIN — LISINOPRIL 10 MILLIGRAM(S): 2.5 TABLET ORAL at 09:32

## 2021-12-15 RX ADMIN — OLANZAPINE 5 MILLIGRAM(S): 15 TABLET, FILM COATED ORAL at 09:31

## 2021-12-15 RX ADMIN — Medication 75 MICROGRAM(S): at 07:36

## 2021-12-15 RX ADMIN — Medication 1 APPLICATION(S): at 23:23

## 2021-12-15 RX ADMIN — POLYETHYLENE GLYCOL 3350 17 GRAM(S): 17 POWDER, FOR SOLUTION ORAL at 10:18

## 2021-12-15 RX ADMIN — QUETIAPINE FUMARATE 50 MILLIGRAM(S): 200 TABLET, FILM COATED ORAL at 17:29

## 2021-12-15 RX ADMIN — CARBIDOPA AND LEVODOPA 1 TABLET(S): 25; 100 TABLET ORAL at 22:21

## 2021-12-15 RX ADMIN — SENNA PLUS 2 TABLET(S): 8.6 TABLET ORAL at 23:09

## 2021-12-15 RX ADMIN — DIVALPROEX SODIUM 500 MILLIGRAM(S): 500 TABLET, DELAYED RELEASE ORAL at 09:31

## 2021-12-15 RX ADMIN — CARBIDOPA AND LEVODOPA 1 TABLET(S): 25; 100 TABLET ORAL at 09:32

## 2021-12-15 RX ADMIN — QUETIAPINE FUMARATE 50 MILLIGRAM(S): 200 TABLET, FILM COATED ORAL at 12:26

## 2021-12-15 RX ADMIN — Medication 25 MILLIGRAM(S): at 23:10

## 2021-12-15 RX ADMIN — BACITRACIN ZINC NEOMYCIN SULFATE POLYMYXIN B SULFATE 1 APPLICATION(S): 400; 3.5; 5 OINTMENT TOPICAL at 23:11

## 2021-12-15 RX ADMIN — OLANZAPINE 5 MILLIGRAM(S): 15 TABLET, FILM COATED ORAL at 23:11

## 2021-12-15 RX ADMIN — Medication 0.25 MILLIGRAM(S): at 23:11

## 2021-12-15 RX ADMIN — DIVALPROEX SODIUM 750 MILLIGRAM(S): 500 TABLET, DELAYED RELEASE ORAL at 23:21

## 2021-12-15 RX ADMIN — Medication 25 MILLIGRAM(S): at 09:31

## 2021-12-15 RX ADMIN — Medication 0.25 MILLIGRAM(S): at 11:47

## 2021-12-15 RX ADMIN — BACITRACIN ZINC NEOMYCIN SULFATE POLYMYXIN B SULFATE 1 APPLICATION(S): 400; 3.5; 5 OINTMENT TOPICAL at 10:29

## 2021-12-15 RX ADMIN — CARBIDOPA AND LEVODOPA 1 TABLET(S): 25; 100 TABLET ORAL at 13:59

## 2021-12-15 NOTE — BH INPATIENT PSYCHIATRY PROGRESS NOTE - NSBHCHARTREVIEWVS_PSY_A_CORE FT
Vital Signs Last 24 Hrs  T(C): 37.1 (12-14-21 @ 10:41), Max: 37.1 (12-14-21 @ 10:41)  T(F): 98.7 (12-14-21 @ 10:41), Max: 98.7 (12-14-21 @ 10:41)  HR: 72 (12-14-21 @ 21:22) (72 - 77)  BP: 128/66 (12-14-21 @ 21:22) (116/67 - 128/66)  BP(mean): --  RR: 18 (12-14-21 @ 21:22) (18 - 18)  SpO2: --     Vital Signs Last 24 Hrs  T(C): --  T(F): --  HR: 64 (12-15-21 @ 09:05) (64 - 72)  BP: 148/83 (12-15-21 @ 09:05) (128/66 - 148/83)  BP(mean): --  RR: 18 (12-15-21 @ 09:05) (18 - 18)  SpO2: 99% (12-15-21 @ 09:05) (99% - 99%)     Vital Signs Last 24 Hrs  T(C): --  T(F): --  HR: --  BP: --  BP(mean): --  RR: --  SpO2: --

## 2021-12-15 NOTE — BH INPATIENT PSYCHIATRY PROGRESS NOTE - NSICDXBHSECONDARYDX_PSY_ALL_CORE
Xerosis of skin   L85.3  Hypertension   I10  Neurocognitive disorder   R41.9  Neurocognitive disorder   R41.9   Hypertension   I10  Neurocognitive disorder   R41.9

## 2021-12-15 NOTE — BH INPATIENT PSYCHIATRY PROGRESS NOTE - NSBHMETABOLIC_PSY_ALL_CORE_FT
BMI: BMI (kg/m2): 24.2 (12-08-21 @ 04:44)  HbA1c: A1C with Estimated Average Glucose Result: 6.0 % (12-09-21 @ 11:05)    Glucose: POCT Blood Glucose.: 131 mg/dL (12-14-21 @ 07:48)    BP: 128/66 (12-14-21 @ 21:22) (115/71 - 146/92)  Lipid Panel: Date/Time: 12-09-21 @ 11:05  Cholesterol, Serum: 133  Direct LDL: --  HDL Cholesterol, Serum: 67  Total Cholesterol/HDL Ration Measurement: --  Triglycerides, Serum: 37   BMI: BMI (kg/m2): 24.2 (12-08-21 @ 04:44)  HbA1c: A1C with Estimated Average Glucose Result: 6.0 % (12-09-21 @ 11:05)    Glucose: POCT Blood Glucose.: 131 mg/dL (12-14-21 @ 07:48)    BP: 148/83 (12-15-21 @ 09:05) (115/71 - 148/83)  Lipid Panel: Date/Time: 12-09-21 @ 11:05  Cholesterol, Serum: 133  Direct LDL: --  HDL Cholesterol, Serum: 67  Total Cholesterol/HDL Ration Measurement: --  Triglycerides, Serum: 37   BMI: BMI (kg/m2): 24.2 (12-08-21 @ 04:44)  HbA1c: A1C with Estimated Average Glucose Result: 6.0 % (12-09-21 @ 11:05)    Glucose: POCT Blood Glucose.: 131 mg/dL (12-14-21 @ 07:48)    BP: 148/83 (12-15-21 @ 09:05) (116/67 - 148/83)  Lipid Panel: Date/Time: 12-09-21 @ 11:05  Cholesterol, Serum: 133  Direct LDL: --  HDL Cholesterol, Serum: 67  Total Cholesterol/HDL Ration Measurement: --  Triglycerides, Serum: 37

## 2021-12-15 NOTE — BH INPATIENT PSYCHIATRY PROGRESS NOTE - NSBHFUPINTERVALHXFT_PSY_A_CORE
Patient was seen at bedside, oriented to time (6 morning), place (hospital), person (doctor). Patient received Olanzapine 5 mg IM for agitation yesterday evening, with good response. Will change Olanzapine from 5 mg TID to 5 mg AM and 10 mg PM. Patient was seen staring at the interviewer, asked interviewer to leave room, as she wants to rest. Patient continues to be uncooperative and disorganized, on constant observation 1:1.  Patient is taking her medications intermittently.  As per RN pt has been agitated, disorganized and throwing things, patient received 5 mg IM olanzapine, with good response. Will increase dose of Olanzapine at night from 10 mg to 15 mg.   As per RN pt has been agitated, disorganized and throwing things, patient received 5 mg IM olanzapine, with good response. Will start patient on Clozapine 25 mg qhs.  As per RN pt has been agitated, disorganized and throwing things, patient received 5 mg IM olanzapine, with good response. Will start patient on Clozapine 25 mg qhs. Patient seen at bedside, oriented to time (morning), patient thinks she is at [facility] not [facility], when asked about the person she said to the writer "you are [place]". Patient continues to be disorganized, and at times agitated. Patient's behavior is out of control. Patient is taking her medications.

## 2021-12-15 NOTE — BH INPATIENT PSYCHIATRY PROGRESS NOTE - CURRENT MEDICATION
MEDICATIONS  (STANDING):  benztropine 0.25 milliGRAM(s) Oral two times a day  carbidopa/levodopa  25/100 1 Tablet(s) Oral three times a day  clotrimazole 1% Cream 1 Application(s) Topical two times a day  diVALproex  milliGRAM(s) Oral daily  diVALproex  milliGRAM(s) Oral at bedtime  latanoprost 0.005% Ophthalmic Solution 1 Drop(s) Both EYES at bedtime  levothyroxine 75 MICROGram(s) Oral daily  lisinopril 10 milliGRAM(s) Oral daily  metoprolol tartrate 25 milliGRAM(s) Oral two times a day  neomycin/bacitracin/polymyxin Topical Ointment 1 Application(s) Topical two times a day  OLANZapine 5 milliGRAM(s) Oral daily  OLANZapine 10 milliGRAM(s) Oral at bedtime  polyethylene glycol 3350 17 Gram(s) Oral daily  senna 2 Tablet(s) Oral at bedtime    MEDICATIONS  (PRN):  dibucaine 1% Ointment 1 Application(s) Topical two times a day PRN Hemorrhoids  QUEtiapine 50 milliGRAM(s) Oral four times a day PRN agitation   MEDICATIONS  (STANDING):  benztropine 0.25 milliGRAM(s) Oral two times a day  carbidopa/levodopa  25/100 1 Tablet(s) Oral three times a day  clotrimazole 1% Cream 1 Application(s) Topical two times a day  cloZAPine 12.5 milliGRAM(s) Oral at bedtime  diVALproex  milliGRAM(s) Oral daily  diVALproex  milliGRAM(s) Oral at bedtime  latanoprost 0.005% Ophthalmic Solution 1 Drop(s) Both EYES at bedtime  levothyroxine 75 MICROGram(s) Oral daily  lisinopril 10 milliGRAM(s) Oral daily  metoprolol tartrate 25 milliGRAM(s) Oral two times a day  neomycin/bacitracin/polymyxin Topical Ointment 1 Application(s) Topical two times a day  OLANZapine 5 milliGRAM(s) Oral daily  OLANZapine 10 milliGRAM(s) Oral at bedtime  polyethylene glycol 3350 17 Gram(s) Oral daily  senna 2 Tablet(s) Oral at bedtime    MEDICATIONS  (PRN):  dibucaine 1% Ointment 1 Application(s) Topical two times a day PRN Hemorrhoids  QUEtiapine 50 milliGRAM(s) Oral four times a day PRN agitation   MEDICATIONS  (STANDING):  benztropine 0.25 milliGRAM(s) Oral two times a day  carbidopa/levodopa  25/100 1 Tablet(s) Oral three times a day  clotrimazole 1% Cream 1 Application(s) Topical two times a day  cloZAPine 25 milliGRAM(s) Oral at bedtime  diVALproex  milliGRAM(s) Oral daily  diVALproex  milliGRAM(s) Oral at bedtime  latanoprost 0.005% Ophthalmic Solution 1 Drop(s) Both EYES at bedtime  levothyroxine 75 MICROGram(s) Oral daily  lisinopril 10 milliGRAM(s) Oral daily  metoprolol tartrate 25 milliGRAM(s) Oral two times a day  neomycin/bacitracin/polymyxin Topical Ointment 1 Application(s) Topical two times a day  OLANZapine 5 milliGRAM(s) Oral daily  OLANZapine 5 milliGRAM(s) Oral at bedtime  polyethylene glycol 3350 17 Gram(s) Oral daily  senna 2 Tablet(s) Oral at bedtime    MEDICATIONS  (PRN):  dibucaine 1% Ointment 1 Application(s) Topical two times a day PRN Hemorrhoids  QUEtiapine 50 milliGRAM(s) Oral four times a day PRN agitation

## 2021-12-15 NOTE — BH INPATIENT PSYCHIATRY PROGRESS NOTE - NSBHASSESSSUMMFT_PSY_ALL_CORE
70W, HTN, HLD, T2DM, asthma, seizure disorder, Parkinson's disease, glaucoma, PVD, bladder Ca, schizophrenia (with multiple past inpatient hospitalizations, last at [facility] October 2021) hemorrhoids BIBEMS from [facility] for acute change in mental status i/s/o hypotension. Currently with decompensated schizophrenia, with episodes of agitation, recently paranoid, grandiose, irritable, with elevated mood and auditory and visual hallucinations. She is being treated with olanzapine 5mg TID and Depakote 750mg ER with no side effects thus far, and decreased agitation. Ativan 1mg PRN seems to be effective for agitation.     12/13/2021 Patient is seen less attentive and less verbal than before, sleepy but easy to arouse, CBC CMP WNL Valproic acid level 74.9 Ammonia 28. Will continue Olanzapine 5 mg TID, Depakote 500 mg AM + 750 MG QHS, Benztropine 0.25 mg BID, and Seroquel 50 mg Q6 PRN for agitation. Will consider start patient on Clozapine when she is less sleepy.     12/14/2021 Patient continues to be disorganized, uncooperative, at times agitated, continues to need PRN meds. Will change olanzapine dose from 5 mg TID to 5 mg AM and 10 mg PM. Will continue Depakote 500 mg AM + 750 MG QHS, Benztropine 0.25 mg BID, and Seroquel 50 mg Q6 PRN for agitation. 70W, HTN, HLD, T2DM, asthma, seizure disorder, Parkinson's disease, glaucoma, PVD, bladder Ca, schizophrenia (with multiple past inpatient hospitalizations, last at [facility] October 2021) hemorrhoids BIBEMS from [facility] for acute change in mental status i/s/o hypotension. Currently with decompensated schizophrenia, with episodes of agitation, recently paranoid, grandiose, irritable, with elevated mood and auditory and visual hallucinations. She is being treated with olanzapine 5mg TID and Depakote 750mg ER with no side effects thus far, and decreased agitation. Ativan 1mg PRN seems to be effective for agitation.     12/13/2021 Patient is seen less attentive and less verbal than before, sleepy but easy to arouse, CBC CMP WNL Valproic acid level 74.9 Ammonia 28. Will continue Olanzapine 5 mg TID, Depakote 500 mg AM + 750 MG QHS, Benztropine 0.25 mg BID, and Seroquel 50 mg Q6 PRN for agitation. Will consider start patient on Clozapine when she is less sleepy.     12/14/2021 Patient continues to be disorganized, uncooperative, at times agitated, continues to need PRN meds. Will change olanzapine dose from 5 mg TID to 5 mg AM and 10 mg PM. Will continue Depakote 500 mg AM + 750 MG QHS, Benztropine 0.25 mg BID, and Seroquel 50 mg Q6 PRN for agitation.    12/15/2021 Will start patient on Clozapine 25 mg qhs as she continues to be actively manic and psychotic.

## 2021-12-16 VITALS
RESPIRATION RATE: 16 BRPM | HEIGHT: 62 IN | HEART RATE: 45 BPM | DIASTOLIC BLOOD PRESSURE: 75 MMHG | SYSTOLIC BLOOD PRESSURE: 132 MMHG | WEIGHT: 100.09 LBS | OXYGEN SATURATION: 100 %

## 2021-12-16 VITALS — DIASTOLIC BLOOD PRESSURE: 71 MMHG | SYSTOLIC BLOOD PRESSURE: 106 MMHG | HEART RATE: 49 BPM

## 2021-12-16 LAB
ALBUMIN SERPL ELPH-MCNC: 3.2 G/DL — LOW (ref 3.3–5)
ALP SERPL-CCNC: 87 U/L — SIGNIFICANT CHANGE UP (ref 40–120)
ALT FLD-CCNC: <5 U/L — LOW (ref 10–45)
AMPHET UR-MCNC: NEGATIVE — SIGNIFICANT CHANGE UP
ANION GAP SERPL CALC-SCNC: 6 MMOL/L — SIGNIFICANT CHANGE UP (ref 5–17)
APPEARANCE UR: ABNORMAL
APTT BLD: 55.8 SEC — HIGH (ref 27.5–35.5)
AST SERPL-CCNC: 37 U/L — SIGNIFICANT CHANGE UP (ref 10–40)
BACTERIA # UR AUTO: ABNORMAL /HPF
BARBITURATES UR SCN-MCNC: NEGATIVE — SIGNIFICANT CHANGE UP
BASE EXCESS BLDV CALC-SCNC: -0.9 MMOL/L — SIGNIFICANT CHANGE UP (ref -2–3)
BASOPHILS # BLD AUTO: 0.01 K/UL — SIGNIFICANT CHANGE UP (ref 0–0.2)
BASOPHILS NFR BLD AUTO: 0.2 % — SIGNIFICANT CHANGE UP (ref 0–2)
BENZODIAZ UR-MCNC: NEGATIVE — SIGNIFICANT CHANGE UP
BILIRUB SERPL-MCNC: <0.2 MG/DL — SIGNIFICANT CHANGE UP (ref 0.2–1.2)
BILIRUB UR-MCNC: NEGATIVE — SIGNIFICANT CHANGE UP
BUN SERPL-MCNC: 21 MG/DL — SIGNIFICANT CHANGE UP (ref 7–23)
CA-I SERPL-SCNC: 1.29 MMOL/L — SIGNIFICANT CHANGE UP (ref 1.15–1.33)
CALCIUM SERPL-MCNC: 9.6 MG/DL — SIGNIFICANT CHANGE UP (ref 8.4–10.5)
CHLORIDE SERPL-SCNC: 110 MMOL/L — HIGH (ref 96–108)
CK MB CFR SERPL CALC: 13.7 NG/ML — HIGH (ref 0–6.7)
CK SERPL-CCNC: 143 U/L — SIGNIFICANT CHANGE UP (ref 25–170)
CO2 BLDV-SCNC: 27.3 MMOL/L — HIGH (ref 22–26)
CO2 SERPL-SCNC: 25 MMOL/L — SIGNIFICANT CHANGE UP (ref 22–31)
COCAINE METAB.OTHER UR-MCNC: NEGATIVE — SIGNIFICANT CHANGE UP
COLOR SPEC: YELLOW — SIGNIFICANT CHANGE UP
CREAT SERPL-MCNC: 0.74 MG/DL — SIGNIFICANT CHANGE UP (ref 0.5–1.3)
DIFF PNL FLD: ABNORMAL
EOSINOPHIL # BLD AUTO: 0.03 K/UL — SIGNIFICANT CHANGE UP (ref 0–0.5)
EOSINOPHIL NFR BLD AUTO: 0.7 % — SIGNIFICANT CHANGE UP (ref 0–6)
EPI CELLS # UR: SIGNIFICANT CHANGE UP /HPF (ref 0–5)
GAS PNL BLDV: 139 MMOL/L — SIGNIFICANT CHANGE UP (ref 136–145)
GAS PNL BLDV: SIGNIFICANT CHANGE UP
GAS PNL BLDV: SIGNIFICANT CHANGE UP
GLUCOSE BLDC GLUCOMTR-MCNC: 108 MG/DL — HIGH (ref 70–99)
GLUCOSE BLDC GLUCOMTR-MCNC: 108 MG/DL — HIGH (ref 70–99)
GLUCOSE SERPL-MCNC: 128 MG/DL — HIGH (ref 70–99)
GLUCOSE UR QL: NEGATIVE — SIGNIFICANT CHANGE UP
HCO3 BLDV-SCNC: 26 MMOL/L — SIGNIFICANT CHANGE UP (ref 22–29)
HCT VFR BLD CALC: 35.8 % — SIGNIFICANT CHANGE UP (ref 34.5–45)
HGB BLD-MCNC: 10.9 G/DL — LOW (ref 11.5–15.5)
IMM GRANULOCYTES NFR BLD AUTO: 0.7 % — SIGNIFICANT CHANGE UP (ref 0–1.5)
INR BLD: 1.22 — HIGH (ref 0.88–1.16)
KETONES UR-MCNC: ABNORMAL MG/DL
LACTATE SERPL-SCNC: 1 MMOL/L — SIGNIFICANT CHANGE UP (ref 0.5–2)
LEUKOCYTE ESTERASE UR-ACNC: ABNORMAL
LYMPHOCYTES # BLD AUTO: 1.05 K/UL — SIGNIFICANT CHANGE UP (ref 1–3.3)
LYMPHOCYTES # BLD AUTO: 25.7 % — SIGNIFICANT CHANGE UP (ref 13–44)
MCHC RBC-ENTMCNC: 26.7 PG — LOW (ref 27–34)
MCHC RBC-ENTMCNC: 30.4 GM/DL — LOW (ref 32–36)
MCV RBC AUTO: 87.7 FL — SIGNIFICANT CHANGE UP (ref 80–100)
METHADONE UR-MCNC: NEGATIVE — SIGNIFICANT CHANGE UP
MONOCYTES # BLD AUTO: 0.33 K/UL — SIGNIFICANT CHANGE UP (ref 0–0.9)
MONOCYTES NFR BLD AUTO: 8.1 % — SIGNIFICANT CHANGE UP (ref 2–14)
NEUTROPHILS # BLD AUTO: 2.63 K/UL — SIGNIFICANT CHANGE UP (ref 1.8–7.4)
NEUTROPHILS NFR BLD AUTO: 64.6 % — SIGNIFICANT CHANGE UP (ref 43–77)
NITRITE UR-MCNC: POSITIVE
NRBC # BLD: 0 /100 WBCS — SIGNIFICANT CHANGE UP (ref 0–0)
OPIATES UR-MCNC: NEGATIVE — SIGNIFICANT CHANGE UP
PCO2 BLDV: 50 MMHG — HIGH (ref 39–42)
PCP SPEC-MCNC: SIGNIFICANT CHANGE UP
PCP UR-MCNC: NEGATIVE — SIGNIFICANT CHANGE UP
PH BLDV: 7.32 — SIGNIFICANT CHANGE UP (ref 7.32–7.43)
PH UR: 6 — SIGNIFICANT CHANGE UP (ref 5–8)
PLATELET # BLD AUTO: 140 K/UL — LOW (ref 150–400)
PO2 BLDV: 105 MMHG — HIGH (ref 25–45)
POTASSIUM BLDV-SCNC: 6.6 MMOL/L — CRITICAL HIGH (ref 3.5–5.1)
POTASSIUM SERPL-MCNC: 5.8 MMOL/L — HIGH (ref 3.5–5.3)
POTASSIUM SERPL-SCNC: 5.8 MMOL/L — HIGH (ref 3.5–5.3)
PROT SERPL-MCNC: 7 G/DL — SIGNIFICANT CHANGE UP (ref 6–8.3)
PROT UR-MCNC: 100 MG/DL
PROTHROM AB SERPL-ACNC: 14.5 SEC — HIGH (ref 10.6–13.6)
RBC # BLD: 4.08 M/UL — SIGNIFICANT CHANGE UP (ref 3.8–5.2)
RBC # FLD: 16.3 % — HIGH (ref 10.3–14.5)
RBC CASTS # UR COMP ASSIST: ABNORMAL /HPF
SAO2 % BLDV: 99.1 % — HIGH (ref 67–88)
SODIUM SERPL-SCNC: 141 MMOL/L — SIGNIFICANT CHANGE UP (ref 135–145)
SP GR SPEC: 1.02 — SIGNIFICANT CHANGE UP (ref 1–1.03)
THC UR QL: NEGATIVE — SIGNIFICANT CHANGE UP
TROPONIN T SERPL-MCNC: <0.01 NG/ML — SIGNIFICANT CHANGE UP (ref 0–0.01)
UROBILINOGEN FLD QL: 0.2 E.U./DL — SIGNIFICANT CHANGE UP
VALPROATE SERPL-MCNC: 43.8 UG/ML — LOW (ref 50–100)
WBC # BLD: 4.08 K/UL — SIGNIFICANT CHANGE UP (ref 3.8–10.5)
WBC # FLD AUTO: 4.08 K/UL — SIGNIFICANT CHANGE UP (ref 3.8–10.5)
WBC UR QL: ABNORMAL /HPF

## 2021-12-16 PROCEDURE — 99231 SBSQ HOSP IP/OBS SF/LOW 25: CPT

## 2021-12-16 PROCEDURE — 99285 EMERGENCY DEPT VISIT HI MDM: CPT

## 2021-12-16 PROCEDURE — 70450 CT HEAD/BRAIN W/O DYE: CPT | Mod: 26,MC

## 2021-12-16 PROCEDURE — 71045 X-RAY EXAM CHEST 1 VIEW: CPT | Mod: 26

## 2021-12-16 RX ORDER — SODIUM CHLORIDE 9 MG/ML
1000 INJECTION INTRAMUSCULAR; INTRAVENOUS; SUBCUTANEOUS ONCE
Refills: 0 | Status: COMPLETED | OUTPATIENT
Start: 2021-12-16 | End: 2021-12-16

## 2021-12-16 RX ORDER — SODIUM ZIRCONIUM CYCLOSILICATE 10 G/10G
10 POWDER, FOR SUSPENSION ORAL ONCE
Refills: 0 | Status: COMPLETED | OUTPATIENT
Start: 2021-12-16 | End: 2021-12-16

## 2021-12-16 RX ORDER — ATROPINE SULFATE 0.1 MG/ML
0.5 SYRINGE (ML) INJECTION ONCE
Refills: 0 | Status: COMPLETED | OUTPATIENT
Start: 2021-12-16 | End: 2021-12-16

## 2021-12-16 RX ORDER — NOREPINEPHRINE BITARTRATE/D5W 8 MG/250ML
0.05 PLASTIC BAG, INJECTION (ML) INTRAVENOUS
Qty: 8 | Refills: 0 | Status: DISCONTINUED | OUTPATIENT
Start: 2021-12-16 | End: 2021-12-18

## 2021-12-16 RX ORDER — DEXTROSE 50 % IN WATER 50 %
50 SYRINGE (ML) INTRAVENOUS ONCE
Refills: 0 | Status: COMPLETED | OUTPATIENT
Start: 2021-12-16 | End: 2021-12-16

## 2021-12-16 RX ORDER — INSULIN HUMAN 100 [IU]/ML
5 INJECTION, SOLUTION SUBCUTANEOUS ONCE
Refills: 0 | Status: COMPLETED | OUTPATIENT
Start: 2021-12-16 | End: 2021-12-16

## 2021-12-16 RX ADMIN — Medication 75 MICROGRAM(S): at 05:56

## 2021-12-16 RX ADMIN — Medication 1 MILLIGRAM(S): at 23:50

## 2021-12-16 RX ADMIN — Medication 0.5 MILLIGRAM(S): at 20:56

## 2021-12-16 RX ADMIN — SODIUM CHLORIDE 1000 MILLILITER(S): 9 INJECTION INTRAMUSCULAR; INTRAVENOUS; SUBCUTANEOUS at 23:30

## 2021-12-16 RX ADMIN — Medication 2 MILLIGRAM(S): at 05:20

## 2021-12-16 RX ADMIN — INSULIN HUMAN 5 UNIT(S): 100 INJECTION, SOLUTION SUBCUTANEOUS at 22:24

## 2021-12-16 RX ADMIN — SODIUM CHLORIDE 1000 MILLILITER(S): 9 INJECTION INTRAMUSCULAR; INTRAVENOUS; SUBCUTANEOUS at 23:00

## 2021-12-16 RX ADMIN — Medication 4.26 MICROGRAM(S)/KG/MIN: at 23:16

## 2021-12-16 RX ADMIN — QUETIAPINE FUMARATE 50 MILLIGRAM(S): 200 TABLET, FILM COATED ORAL at 04:55

## 2021-12-16 RX ADMIN — Medication 50 MILLILITER(S): at 22:24

## 2021-12-16 NOTE — BH INPATIENT PSYCHIATRY PROGRESS NOTE - NSBHPSYCHOLCOGORIENT_PSY_A_CORE
Unable to assess
Not fully oriented...
Unable to assess
Not fully oriented...
Unable to assess
Unable to assess
Not fully oriented...
Unable to assess

## 2021-12-16 NOTE — BH INPATIENT PSYCHIATRY PROGRESS NOTE - NSBHMSEAFFCONG_PSY_A_CORE
Congruent
Unable to assess
Congruent
Congruent
Unable to assess

## 2021-12-16 NOTE — ED PROVIDER NOTE - MUSCULOSKELETAL, MLM
patient lives at home with , also has HHA 7 days per week for approx 8 hours  never smoker, no alcohol/illicit drug use
Spine appears normal, range of motion is not limited, no muscle or joint tenderness, moving all extremities, pulses intact bilateral UE and LE.

## 2021-12-16 NOTE — CONSULT NOTE ADULT - ASSESSMENT
70F HTN, T2DM (a1c 6.0), asthma, hypothyroidism, seizure disorder, Parkinson's disease, hypothyroidism, schizophrenia transferred to the ED from [facility] for altered mental status. ICU was consulted for AMS and hypothermia.    #AMS, likely multifactorial - differential includes status epilepticus (as pt with known seizure history, however less likely as normal lactate), advanced dementia/PD, myxedema (TFTs just came back nml), polypharmacy, sepsis (+UA)  #hypothermia  #bradycardia  #anemia  #hyperkalemia    NEURO  -avoid sedatives, opiates, benzos  -f/u Utox  -vEEG  -hold antipsychotics  -consult Psych    PULM  -currently protecting airway, on RA  -f/u ABG    CARDIOVASCULAR  -fluid bolus as needed  -pt initially requiring levophed, wean as able  -hold home antihypertensives  -keep pacer pads on patient, atropine at bedside    GI  -keep NPO  -bedside dysphagia screen when back to baseline    ENDO  -TFTs wnl  -continue synthroid  -monitor blood glucose  -ISS as needed    HEME  #normocytic anemia (baseline 10-11)  -H/H 10.9/35.8, MCV 87.7  -f/u iron studies    RENAL  no active issues    DISPO: ICU until mental status improves  Case discussed with ICU attending Dr. Villatoro 70F HTN, T2DM (a1c 6.0), asthma, hypothyroidism, seizure disorder, Parkinson's disease, hypothyroidism, schizophrenia transferred to the ED from [facility] for altered mental status. ICU was consulted for AMS and hypothermia.    #AMS, likely multifactorial - differential includes status epilepticus (as pt with known seizure history, however less likely as normal lactate), advanced dementia/PD, myxedema (TFTs just came back nml), polypharmacy, sepsis (+UA)  #hypothermia  #bradycardia  #anemia  #hyperkalemia    NEURO  -avoid sedatives, opiates, benzos  -f/u Utox  -vEEG  -hold antipsychotics  -consult Psych    PULM  -currently protecting airway, on RA  -f/u ABG    CARDIOVASCULAR  -fluid bolus as needed  -pt initially requiring levophed, wean as able  -hold home antihypertensives  -keep pacer pads on patient, atropine at bedside    ID  #UTI (+UA)  -Given Ceftriaxone 1g in the ED  -c/w Ceftriaxone 1g q24h  -f/u urine cx    GI  -keep NPO  -bedside dysphagia screen when back to baseline    ENDO  -TFTs wnl  -continue synthroid  -monitor blood glucose  -ISS as needed    HEME  #normocytic anemia (baseline 10-11)  -H/H 10.9/35.8, MCV 87.7  -f/u iron studies    RENAL  no active issues    DISPO: ICU until mental status improves  Case discussed with ICU attending Dr. Villatoro 70F HTN, T2DM (a1c 6.0), asthma, hypothyroidism, seizure disorder, Parkinson's disease, hypothyroidism, schizophrenia transferred to the ED from [facility] for altered mental status. ICU was consulted for AMS and hypothermia.    #AMS, likely multifactorial - differential includes status epilepticus (as pt with known seizure history, however less likely as normal lactate), advanced dementia/PD, myxedema (TFTs just came back nml), polypharmacy, sepsis (+UA)  #hypothermia  #bradycardia  #anemia  #hyperkalemia    NEURO  -avoid sedatives, opiates, benzos  -f/u Utox  -vEEG  -hold antipsychotics  -consult Psych    PULM  -currently protecting airway, on RA  -f/u ABG    CARDIOVASCULAR  -fluid bolus as needed  -pt initially requiring levophed, wean as able  -hold home antihypertensives  -keep pacer pads on patient, atropine at bedside    ID  #UTI (+UA)  -Given Ceftriaxone 1g in the ED  -c/w Ceftriaxone 1g q24h  -f/u urine cx, blood cx    GI  -keep NPO  -bedside dysphagia screen when back to baseline    ENDO  -TFTs wnl  -continue synthroid  -monitor blood glucose  -ISS as needed    HEME  #normocytic anemia (baseline 10-11)  -H/H 10.9/35.8, MCV 87.7  -f/u iron studies    RENAL  no active issues    DISPO: ICU until mental status improves  Case discussed with ICU attending Dr. Villatoro

## 2021-12-16 NOTE — BH INPATIENT PSYCHIATRY PROGRESS NOTE - NSBHMETABOLIC_PSY_ALL_CORE_FT
BMI: BMI (kg/m2): 24.2 (12-08-21 @ 04:44)  HbA1c: A1C with Estimated Average Glucose Result: 6.0 % (12-09-21 @ 11:05)    Glucose: POCT Blood Glucose.: 108 mg/dL (12-16-21 @ 16:12)    BP: 148/83 (12-15-21 @ 09:05) (116/67 - 148/83)  Lipid Panel: Date/Time: 12-09-21 @ 11:05  Cholesterol, Serum: 133  Direct LDL: --  HDL Cholesterol, Serum: 67  Total Cholesterol/HDL Ration Measurement: --  Triglycerides, Serum: 37

## 2021-12-16 NOTE — BH INPATIENT PSYCHIATRY PROGRESS NOTE - NSBHMSEAFFRANGE_PSY_A_CORE
Constricted/Unable to assess
Unable to assess
Constricted/Unable to assess
Full
Constricted
Full

## 2021-12-16 NOTE — BH INPATIENT PSYCHIATRY PROGRESS NOTE - NSBHMSEAFFQUAL_PSY_A_CORE
Irritable
Unable to assess
Irritable
Euthymic
Elevated/Irritable
Irritable
Elevated/Irritable
Unable to assess

## 2021-12-16 NOTE — CONSULT NOTE ADULT - SUBJECTIVE AND OBJECTIVE BOX
ICU Consult Note    HPI:      Allergies    penicillin (Hives; Rash)    Intolerances        MEDICATIONS  (STANDING):  dextrose 50% Injectable 50 milliLiter(s) IV Push once  insulin regular  human recombinant 5 Unit(s) IV Push once  sodium zirconium cyclosilicate 10 Gram(s) Oral Once    MEDICATIONS  (PRN):      PAST MEDICAL & SURGICAL HISTORY:  Parkinson disease    Seizure disorder    Hypothyroidism    Type II diabetes mellitus    Hyperlipidemia    Schizophrenia    Hemorrhoids    Hypertension    No significant past surgical history        FAMILY HISTORY:      SOCIAL HISTORY: No EtOH, no tobacco    REVIEW OF SYSTEMS:    CONSTITUTIONAL: No weakness, fevers or chills  EYES/ENT: No visual changes;  No vertigo or throat pain   NECK: No pain or stiffness  RESPIRATORY: No cough, wheezing, hemoptysis; No shortness of breath  CARDIOVASCULAR: No chest pain or palpitations  GASTROINTESTINAL: No abdominal or epigastric pain. No nausea, vomiting, or hematemesis; No diarrhea or constipation. No melena or hematochezia.  GENITOURINARY: No dysuria, frequency or hematuria  NEUROLOGICAL: No numbness or weakness  SKIN: No itching, burning, rashes, or lesions   All other review of systems is negative unless indicated above.    Height (cm): 157.5 (12-16 @ 20:43)  Weight (kg): 45.4 (12-16 @ 20:43)  BMI (kg/m2): 18.3 (12-16 @ 20:43)  BSA (m2): 1.42 (12-16 @ 20:43)    T(F): --  HR: 49 (12-16-21 @ 20:51)  BP: 106/71 (12-16-21 @ 20:51)  RR: 16 (12-16-21 @ 20:43)  SpO2: 100% (12-16-21 @ 20:43)  Wt(kg): --    GENERAL: NAD, well-developed  HEAD:  Atraumatic, Normocephalic  EYES: EOMI, PERRLA, conjunctiva and sclera clear  NECK: Supple, No JVD  CHEST/LUNG: Clear to auscultation bilaterally; No wheeze  HEART: Regular rate and rhythm; No murmurs, rubs, or gallops  ABDOMEN: Soft, Nontender, Nondistended; Bowel sounds present  EXTREMITIES:  2+ Peripheral Pulses, No clubbing, cyanosis, or edema  NEUROLOGY: non-focal  SKIN: No rashes or lesions                          10.9   4.08  )-----------( 140      ( 16 Dec 2021 21:03 )             35.8       12-16    141  |  110<H>  |  21  ----------------------------<  128<H>  5.8<H>   |  25  |  0.74    Ca    9.6      16 Dec 2021 21:03    TPro  7.0  /  Alb  3.2<L>  /  TBili  <0.2  /  DBili  x   /  AST  37  /  ALT  <5<L>  /  AlkPhos  87  12-16           ICU Consult Note    HPI:  70F HTN, T2DM (a1c 6.0), asthma, hypothyroidism, seizure disorder, Parkinson's disease, hypothyroidism, schizophrenia transferred to the ED from [facility] for altered mental status. Pt. has had a prolonged hospital course, as she was initially transferred to [facility] from [facility] on 11/21 for lethargy and altered mental status. The patient underwent a comprehensive neuro/stroke workup with CTH, CTA head and neck and CT perfusion being grossly negative for any acute pathology. The patient was then transferred to [facility] on 12/7 for active psychosis and schizophrenia requiring physical and chemical restraints. While on [facility], the patient was placed on multiple different psychotropic medications. Per aide at bedside who has worked with patient since her admission to [facility], patient has not been her usual self today. Pt is normally active, agitated at baseline, however was noted to be lethargic and somnolent today. Pt reportedly received a PRN Zyprexa and 2mg ativan the night before, and has been sleeping in bed all morning. She missed her meals and medications, so when the aide went to wake her up around 8pm, pt was minimally responsive, bradycardic and cool to touch. A rapid response was called, and pt was brought to the ED.     ED Vitals: T85-->91.5 (on dana hugger), HR 45-50-->85, BP 50-130s/30-70s, RR 12-16 SpO2 %  ED Labs: WBC 4.08, H/H 10.9/35.8, plt 140, K 5.8, BUN/Cr 21/0.74  Allergies    penicillin (Hives; Rash)    Intolerances        MEDICATIONS  (STANDING):  dextrose 50% Injectable 50 milliLiter(s) IV Push once  insulin regular  human recombinant 5 Unit(s) IV Push once  sodium zirconium cyclosilicate 10 Gram(s) Oral Once    LORazepam 1 mg oral tablet: 1 tab(s) orally every 12 hours  QUEtiapine 50 mg oral tablet: 1 tab(s) orally every 4 hours, As needed, agitation  OLANZapine 10 mg intramuscular injection: 2.5 milligram(s) intramuscular every 6 hours, As needed, agitation  OLANZapine 5 mg oral tablet: 1 tab(s) orally 3 times a day  benztropine: 0.25 milligram(s) orally 2 times a day  divalproex sodium 125 mg oral delayed release capsule: 6 cap(s) orally once a day (at bedtime)  divalproex sodium 500 mg oral delayed release tablet: 1 tab(s) orally once a day (in the morning)  clotrimazole 1% topical cream: 1 application topically 2 times a day  polyethylene glycol 3350 oral powder for reconstitution: 17 gram(s) orally once a day  ammonium lactate 12% topical cream: 1 application topically 2 times a day  levothyroxine 75 mcg (0.075 mg) oral tablet: 1 tab(s) orally once a day  senna oral tablet: 1 tab(s) orally 2 times a day  lactulose 10 g/15 mL oral syrup: 15 milliliter(s) orally once a day (at bedtime)  hydrocortisone 1% topical cream: 1 application topically every 12 hours for active hemorrhoids  metoprolol tartrate 25 mg oral tablet: 1 tab(s) orally 2 times a day  risperiDONE 0.5 mg oral tablet: 3 tab(s) orally 2 times a day  carbidopa-levodopa 25 mg-100 mg oral tablet: 1 tab(s) orally 3 times a day  lisinopril 10 mg oral tablet: 1 tab(s) orally once a day      MEDICATIONS  (PRN):      PAST MEDICAL & SURGICAL HISTORY:  Parkinson disease    Seizure disorder    Hypothyroidism    Type II diabetes mellitus    Hyperlipidemia    Schizophrenia    Hemorrhoids    Hypertension    No significant past surgical history        FAMILY HISTORY:      SOCIAL HISTORY: No EtOH, no tobacco    REVIEW OF SYSTEMS:    CONSTITUTIONAL: No weakness, fevers or chills  EYES/ENT: No visual changes;  No vertigo or throat pain   NECK: No pain or stiffness  RESPIRATORY: No cough, wheezing, hemoptysis; No shortness of breath  CARDIOVASCULAR: No chest pain or palpitations  GASTROINTESTINAL: No abdominal or epigastric pain. No nausea, vomiting, or hematemesis; No diarrhea or constipation. No melena or hematochezia.  GENITOURINARY: No dysuria, frequency or hematuria  NEUROLOGICAL: No numbness or weakness  SKIN: No itching, burning, rashes, or lesions   All other review of systems is negative unless indicated above.    Height (cm): 157.5 (12-16 @ 20:43)  Weight (kg): 45.4 (12-16 @ 20:43)  BMI (kg/m2): 18.3 (12-16 @ 20:43)  BSA (m2): 1.42 (12-16 @ 20:43)    T(F): --  HR: 49 (12-16-21 @ 20:51)  BP: 106/71 (12-16-21 @ 20:51)  RR: 16 (12-16-21 @ 20:43)  SpO2: 100% (12-16-21 @ 20:43)  Wt(kg): --    GENERAL: NAD, well-developed  HEAD:  Atraumatic, Normocephalic  EYES: EOMI, PERRLA, conjunctiva and sclera clear  NECK: Supple, No JVD  CHEST/LUNG: Clear to auscultation bilaterally; No wheeze  HEART: Regular rate and rhythm; No murmurs, rubs, or gallops  ABDOMEN: Soft, Nontender, Nondistended; Bowel sounds present  EXTREMITIES:  2+ Peripheral Pulses, No clubbing, cyanosis, or edema  NEUROLOGY: non-focal  SKIN: No rashes or lesions                          10.9   4.08  )-----------( 140      ( 16 Dec 2021 21:03 )             35.8       12-16    141  |  110<H>  |  21  ----------------------------<  128<H>  5.8<H>   |  25  |  0.74    Ca    9.6      16 Dec 2021 21:03    TPro  7.0  /  Alb  3.2<L>  /  TBili  <0.2  /  DBili  x   /  AST  37  /  ALT  <5<L>  /  AlkPhos  87  12-16           ICU Consult Note    HPI:  70F HTN, T2DM (a1c 6.0), asthma, hypothyroidism, seizure disorder, Parkinson's disease, hypothyroidism, schizophrenia transferred to the ED from [facility] for altered mental status. Pt. has had a prolonged hospital course, as she was initially transferred to [facility] from [facility] on 11/21 for lethargy and altered mental status. The patient underwent a comprehensive neuro/stroke workup with CTH, CTA head and neck and CT perfusion being grossly negative for any acute pathology. The patient was then transferred to [facility] on 12/7 for active psychosis and schizophrenia requiring physical and chemical restraints. While on [facility], the patient was placed on multiple different psychotropic medications. Per aide at bedside who has worked with patient since her admission to [facility], patient has not been her usual self today. Pt is normally active, agitated at baseline, however was noted to be lethargic and somnolent today. Pt reportedly received a PRN Zyprexa and 2mg ativan the night before, and has been sleeping in bed all morning. She missed her meals and medications, so when the aide went to wake her up around 8pm, pt was minimally responsive, bradycardic and cool to touch. A rapid response was called, and pt was brought to the ED.     ED Vitals: T85-->91.5 (on dana hugger), HR 45-50-->85, BP 50-130s/30-70s, RR 12-16 SpO2 %  ED Labs: WBC 4.08, H/H 10.9/35.8, plt 140, K 5.8, BUN/Cr 21/0.74, Lactate 1.0, Valproic Acid 43.8, Ammonia 28  CXR: no consolidations or effusions noted  EKG: sinus bradycardia  CTH: no acute pathology  ED Interventions: Atropine 0.5, 5U Insulin, D50, levophed, 4L NS    Allergies    penicillin (Hives; Rash)    Intolerances        MEDICATIONS  (STANDING):  dextrose 50% Injectable 50 milliLiter(s) IV Push once  insulin regular  human recombinant 5 Unit(s) IV Push once  sodium zirconium cyclosilicate 10 Gram(s) Oral Once    LORazepam 1 mg oral tablet: 1 tab(s) orally every 12 hours  QUEtiapine 50 mg oral tablet: 1 tab(s) orally every 4 hours, As needed, agitation  OLANZapine 10 mg intramuscular injection: 2.5 milligram(s) intramuscular every 6 hours, As needed, agitation  OLANZapine 5 mg oral tablet: 1 tab(s) orally 3 times a day  benztropine: 0.25 milligram(s) orally 2 times a day  divalproex sodium 125 mg oral delayed release capsule: 6 cap(s) orally once a day (at bedtime)  divalproex sodium 500 mg oral delayed release tablet: 1 tab(s) orally once a day (in the morning)  clotrimazole 1% topical cream: 1 application topically 2 times a day  polyethylene glycol 3350 oral powder for reconstitution: 17 gram(s) orally once a day  ammonium lactate 12% topical cream: 1 application topically 2 times a day  levothyroxine 75 mcg (0.075 mg) oral tablet: 1 tab(s) orally once a day  senna oral tablet: 1 tab(s) orally 2 times a day  lactulose 10 g/15 mL oral syrup: 15 milliliter(s) orally once a day (at bedtime)  hydrocortisone 1% topical cream: 1 application topically every 12 hours for active hemorrhoids  metoprolol tartrate 25 mg oral tablet: 1 tab(s) orally 2 times a day  risperiDONE 0.5 mg oral tablet: 3 tab(s) orally 2 times a day  carbidopa-levodopa 25 mg-100 mg oral tablet: 1 tab(s) orally 3 times a day  lisinopril 10 mg oral tablet: 1 tab(s) orally once a day      MEDICATIONS  (PRN):      PAST MEDICAL & SURGICAL HISTORY:  Parkinson disease    Seizure disorder    Hypothyroidism    Type II diabetes mellitus    Hyperlipidemia    Schizophrenia    Hemorrhoids    Hypertension    No significant past surgical history        FAMILY HISTORY:      SOCIAL HISTORY: No EtOH, no tobacco    REVIEW OF SYSTEMS:  unable to obtain ROS as pt obtunded and responsive only to noxious stimuli  CONSTITUTIONAL: No weakness, fevers or chills  EYES/ENT: No visual changes;  No vertigo or throat pain   NECK: No pain or stiffness  RESPIRATORY: No cough, wheezing, hemoptysis; No shortness of breath  CARDIOVASCULAR: No chest pain or palpitations  GASTROINTESTINAL: No abdominal or epigastric pain. No nausea, vomiting, or hematemesis; No diarrhea or constipation. No melena or hematochezia.  GENITOURINARY: No dysuria, frequency or hematuria  NEUROLOGICAL: No numbness or weakness  SKIN: No itching, burning, rashes, or lesions   All other review of systems is negative unless indicated above.    Height (cm): 157.5 (12-16 @ 20:43)  Weight (kg): 45.4 (12-16 @ 20:43)  BMI (kg/m2): 18.3 (12-16 @ 20:43)  BSA (m2): 1.42 (12-16 @ 20:43)    T(F): --  HR: 49 (12-16-21 @ 20:51)  BP: 106/71 (12-16-21 @ 20:51)  RR: 16 (12-16-21 @ 20:43)  SpO2: 100% (12-16-21 @ 20:43)  Wt(kg): --    GENERAL: minimally responsive only to noxious stimuli, not in acute distress, protecting airway  HEAD:  Atraumatic, Normocephalic  EYES: anisocoria (R dilated, L constricted) conjunctiva and sclera clear  NECK: Supple, No JVD  CHEST/LUNG: Clear to auscultation bilaterally; No wheeze  HEART: bradycardic, regular rhythm; No murmurs, rubs, or gallops  ABDOMEN: Soft, Nontender, Nondistended; Bowel sounds present  EXTREMITIES:  2+ Peripheral Pulses, No clubbing, cyanosis, or edema, rigid extremities  NEUROLOGY: AAOx0, responsive only to noxious stimuli, +gag reflex  SKIN: No rashes or lesions, cool to touch                          10.9   4.08  )-----------( 140      ( 16 Dec 2021 21:03 )             35.8       12-16    141  |  110<H>  |  21  ----------------------------<  128<H>  5.8<H>   |  25  |  0.74    Ca    9.6      16 Dec 2021 21:03    TPro  7.0  /  Alb  3.2<L>  /  TBili  <0.2  /  DBili  x   /  AST  37  /  ALT  <5<L>  /  AlkPhos  87  12-16

## 2021-12-16 NOTE — BH INPATIENT PSYCHIATRY PROGRESS NOTE - NSBHMSEPERCEPT_PSY_A_CORE
Auditory hallucinations/Visual hallucinations
Unable to assess
Unable to assess
Auditory hallucinations/Visual hallucinations
Unable to assess
No abnormalities

## 2021-12-16 NOTE — ED ADULT TRIAGE NOTE - CHIEF COMPLAINT QUOTE
Patient reported to be a "rapid response" in in-patient psych jimenez. Patient reported to have been diaphoretic, bradypneic with initial contact.

## 2021-12-16 NOTE — CHART NOTE - NSCHARTNOTEFT_GEN_A_CORE
***Rapid Response Clinical Impact Advanced Care Provider Note***    Patient is a 70y old  Female         admitted for HPI: 71 yo domiciled ([facility]) F with PMH of HTN, T2DM, asthma, seizure disorder, Parkinson's disease, glaucoma, schizophrenia (with multiple past inpatient hospitalizations, last at [facility] October 2021) hemorrhoids BIBEMS from [facility] for acute change in mental status i/s/o hypotension. Likely medication-induced given negative brain imaging and negative metabolic and infectious work up, now with decompensated schizophrenia. Per inpatient psychiatry notes at [facility], patient was having increasing agitated episodes requiring physical and chemical restraints. Was transferred from [facility] to [facility] inpatient psychiatry unit on 12/7 for further management and concern for psychiatric decompensation. A rapid response was called to [facility] due to patient's change in mental status with bradycardia and hypotension. On arrival patient was A + O x 0 and with RR < 12, however, she became more responsive with painful stimulus and was protecting her airway. She was placed on Zoll monitor and pacing pads and subsequently escorted to the emergency department for further evaluation. Remainder of care as per emergency department.       Rapid response team called because patient had change in mental status with bradycardia and hypotension     Patient was seen and examined at the bedside by the rapid response team.    Review of Systems: unable to perform ROS due to patient's mental status     Allergies    penicillin (Hives; Rash)    Intolerances        PAST MEDICAL & SURGICAL HISTORY:  Parkinson disease    Seizure disorder    Hypothyroidism    Type II diabetes mellitus    Hyperlipidemia    Schizophrenia    Hemorrhoids    Hypertension    No significant past surgical history        Vital Signs Last 24 Hrs  T(C): --  T(F): --  HR: 49 (16 Dec 2021 20:51) (45 - 49)  BP: 106/71 (16 Dec 2021 20:51) (106/71 - 132/75)  BP(mean): --  RR: 16 (16 Dec 2021 20:43) (16 - 16)  SpO2: 100% (16 Dec 2021 20:43) (100% - 100%)          GENERAL: nonverbal, disheveled & agitated appearing   HEENT: Head is normocephalic and atraumatic. Extraocular muscles are intact. Mucous membranes are moist. No throat erythema/exudates no lymphadenopathy, no JVD,   NECK: Supple.  LUNGS: Clear to auscultation BL without wheezing, rales or rhonchi; respirations unlabored  HEART: bradycardia ,+S1/+S2, no murmurs, rubs, gallops  ABDOMEN: Soft, nontender, and nondistended, no rebound, guarding rigidity, bowel sounds in all 4 quadrants  EXTREMITIES: cool to the touch with marked rigidity   SKIN: No new rashes or lesions.  MSK: strength equal BL  VASCULAR: Radial and Dorsal pedal pulses palpable BL  NEUROLOGIC: CN II-XII grossly intact   PSYCH: acutely psychotic                             MEDICATIONS  (STANDING):    MEDICATIONS  (PRN):      Assessment- Rapid Response called for 70 year old female with a past medical history of HTN, T2DM, asthma, seizure disorder, Parkinson's disease, glaucoma, and schizophrenia who was admitted for hypotension due to polypharmacy and transferred to [facility] due to decompensated schizophrenia for which a rapid response was called for lethargy, hypotension and bradycardia.         Plan-    Suspect vital sign derangements are due to polypharmacy as patient on multiple psychotropic agents and received IM Zyprexa in the last 24 hours due to psychosis.   1L LR bolus  STAT head CT given change in mental status  infectious workup/pan culture to rule out infectious source of encephalopathy  ammonia level, UTox   rectal temp as patient cool to touch  obtain 12 lead EKG  monitor electrolytes   obtain serial cardiac enzymes and trend until peak   monitor for QT prolongation given multiple psychotropic medications  would not recommend Atropine at this time as less likely to be effective  would avoid dopamine antagonists i.e. Haldol given Parkinson's disease  consider neurology consult as patient may have autonomic dysfunction due to Parkinsonian crisis & will require medication titration   neuroleptic malignant syndrome unlikely as patient afebrile   would continue cogentin for EPS prevention  would hold patient's Metoprolol at this time ***Rapid Response Clinical Impact Advanced Care Provider Note***    Patient is a 70y old  Female         admitted for HPI: 69 yo domiciled ([facility]) F with PMH of HTN, T2DM, asthma, seizure disorder, Parkinson's disease, glaucoma, schizophrenia (with multiple past inpatient hospitalizations, last at [facility] October 2021) hemorrhoids BIBEMS from [facility] for acute change in mental status i/s/o hypotension. Likely medication-induced given negative brain imaging and negative metabolic and infectious work up, now with decompensated schizophrenia. Per inpatient psychiatry notes at [facility], patient was having increasing agitated episodes requiring physical and chemical restraints. Was transferred from [facility] to [facility] inpatient psychiatry unit on 12/7 for further management and concern for psychiatric decompensation. A rapid response was called to [facility] due to patient's change in mental status with bradycardia and hypotension. On arrival patient was A + O x 0 and with RR < 12, however, she became more responsive with painful stimulus and was protecting her airway. She was placed on Zoll monitor and pacing pads and subsequently escorted to the emergency department for further evaluation. Remainder of care as per emergency department.       Rapid response team called because patient had change in mental status with bradycardia and hypotension     Patient was seen and examined at the bedside by the rapid response team.    Review of Systems: unable to perform ROS due to patient's mental status     Allergies    penicillin (Hives; Rash)    Intolerances        PAST MEDICAL & SURGICAL HISTORY:  Parkinson disease    Seizure disorder    Hypothyroidism    Type II diabetes mellitus    Hyperlipidemia    Schizophrenia    Hemorrhoids    Hypertension    No significant past surgical history        Vital Signs Last 24 Hrs  T(C): --  T(F): --  HR: 49 (16 Dec 2021 20:51) (45 - 49)  BP: 106/71 (16 Dec 2021 20:51) (106/71 - 132/75)  BP(mean): --  RR: 16 (16 Dec 2021 20:43) (16 - 16)  SpO2: 100% (16 Dec 2021 20:43) (100% - 100%)          GENERAL: nonverbal, disheveled & agitated appearing   HEENT: Head is normocephalic and atraumatic. Extraocular muscles are intact. Mucous membranes are moist. No throat erythema/exudates no lymphadenopathy, no JVD,   NECK: Supple.  LUNGS: Clear to auscultation BL without wheezing, rales or rhonchi; respirations unlabored  HEART: bradycardia ,+S1/+S2, no murmurs, rubs, gallops  ABDOMEN: Soft, nontender, and nondistended, no rebound, guarding rigidity, bowel sounds in all 4 quadrants  EXTREMITIES: cool to the touch with marked rigidity   SKIN: No new rashes or lesions.  MSK: strength equal BL  VASCULAR: Radial and Dorsal pedal pulses palpable BL  NEUROLOGIC: CN II-XII grossly intact   PSYCH: acutely psychotic                             MEDICATIONS  (STANDING):    MEDICATIONS  (PRN):      Assessment- Rapid Response called for 70 year old female with a past medical history of HTN, T2DM, asthma, seizure disorder, Parkinson's disease, glaucoma, and schizophrenia who was admitted for hypotension due to polypharmacy and transferred to [facility] due to decompensated schizophrenia for which a rapid response was called for lethargy, hypotension and bradycardia.         Plan-    Suspect vital sign derangements are due to polypharmacy as patient on multiple psychotropic agents and received IM Zyprexa in the last 24 hours due to psychosis.   1L LR bolus  STAT head CT given change in mental status  infectious workup/pan culture to rule out infectious source of encephalopathy  ammonia level, UTox   rectal temp as patient cool to touch  obtain 12 lead EKG  monitor electrolytes   obtain serial cardiac enzymes and trend until peak   monitor for QT prolongation given multiple psychotropic medications  would not recommend Atropine at this time as less likely to be effective until patient is re-warmed  would avoid dopamine antagonists i.e. Haldol given Parkinson's disease  consider neurology consult as patient may have autonomic dysfunction due to Parkinsonian crisis & will require medication titration   neuroleptic malignant syndrome unlikely as patient afebrile   would continue cogentin for EPS prevention  would hold patient's Metoprolol at this time

## 2021-12-16 NOTE — BH INPATIENT PSYCHIATRY PROGRESS NOTE - NSBHATTESTSEENBY_PSY_A_CORE
Attending Psychiatrist supervising NP/Trainee, meeting pt...
Trainee with telephonic supervision from Attending Psychiatrist
Trainee with telephonic supervision from Attending Psychiatrist
attending Psychiatrist without NP/Trainee
Attending Psychiatrist supervising NP/Trainee, meeting pt...

## 2021-12-16 NOTE — BH INPATIENT PSYCHIATRY PROGRESS NOTE - NSBHINPTBILLING_PSY_ALL_CORE
Non-billable
54263 - Inpatient High Complexity
81803 - Inpatient Low Complexity
[id number] - Inpatient Low Complexity
[id number] - Inpatient Moderate Complexity
14410 - Inpatient Moderate Complexity
63175 - Inpatient Low Complexity
03730 - Inpatient Moderate Complexity

## 2021-12-16 NOTE — BH INPATIENT PSYCHIATRY PROGRESS NOTE - NSBHMSEMUSCLE_PSY_A_CORE
Abnormal muscle tone/strength
Abnormal muscle tone/strength
Unable to assess
Abnormal muscle tone/strength

## 2021-12-16 NOTE — BH INPATIENT PSYCHIATRY PROGRESS NOTE - CURRENT MEDICATION
MEDICATIONS  (STANDING):  benztropine 0.25 milliGRAM(s) Oral two times a day  carbidopa/levodopa  25/100 1 Tablet(s) Oral three times a day  clotrimazole 1% Cream 1 Application(s) Topical two times a day  cloZAPine 25 milliGRAM(s) Oral at bedtime  diVALproex  milliGRAM(s) Oral daily  diVALproex  milliGRAM(s) Oral at bedtime  latanoprost 0.005% Ophthalmic Solution 1 Drop(s) Both EYES at bedtime  levothyroxine 75 MICROGram(s) Oral daily  lisinopril 10 milliGRAM(s) Oral daily  metoprolol tartrate 25 milliGRAM(s) Oral two times a day  neomycin/bacitracin/polymyxin Topical Ointment 1 Application(s) Topical two times a day  OLANZapine 5 milliGRAM(s) Oral daily  OLANZapine 5 milliGRAM(s) Oral at bedtime  polyethylene glycol 3350 17 Gram(s) Oral daily  senna 2 Tablet(s) Oral at bedtime    MEDICATIONS  (PRN):  dibucaine 1% Ointment 1 Application(s) Topical two times a day PRN Hemorrhoids  QUEtiapine 50 milliGRAM(s) Oral four times a day PRN agitation

## 2021-12-16 NOTE — BH INPATIENT PSYCHIATRY PROGRESS NOTE - NSBHASSESSSUMMFT_PSY_ALL_CORE
70W, HTN, HLD, T2DM, asthma, seizure disorder, Parkinson's disease, glaucoma, PVD, bladder Ca, schizophrenia (with multiple past inpatient hospitalizations, last at [facility] October 2021) hemorrhoids BIBEMS from [facility] for acute change in mental status i/s/o hypotension. Currently with decompensated schizophrenia, with episodes of agitation, recently paranoid, grandiose, irritable, with elevated mood and auditory and visual hallucinations. She is being treated with olanzapine 5mg TID and Depakote 750mg ER with no side effects thus far, and decreased agitation. Ativan 1mg PRN seems to be effective for agitation.     12/13/2021 Patient is seen less attentive and less verbal than before, sleepy but easy to arouse, CBC CMP WNL Valproic acid level 74.9 Ammonia 28. Will continue Olanzapine 5 mg TID, Depakote 500 mg AM + 750 MG QHS, Benztropine 0.25 mg BID, and Seroquel 50 mg Q6 PRN for agitation. Will consider start patient on Clozapine when she is less sleepy.     12/14/2021 Patient continues to be disorganized, uncooperative, at times agitated, continues to need PRN meds. Will change olanzapine dose from 5 mg TID to 5 mg AM and 10 mg PM. Will continue Depakote 500 mg AM + 750 MG QHS, Benztropine 0.25 mg BID, and Seroquel 50 mg Q6 PRN for agitation.    12/15/2021 Will start patient on Clozapine 25 mg qhs as she continues to be actively manic and psychotic.

## 2021-12-16 NOTE — ED PROVIDER NOTE - CLINICAL SUMMARY MEDICAL DECISION MAKING FREE TEXT BOX
71 y/o F pt presents to ED with AMS. Plan for labs, CT head, given IV fluids, and Atropine given with no improvement of heart rate. Pt found hypothermic to 85, bear hugger placed on pt for warming, and warm IV fluids given.     ICU in ED to evaluate pt, requested neuro consult. Case discussed with stroke PA, will evaluate pt. Pt readmitted. 69 y/o F pt presents to ED with AMS. Plan for labs, CT head, given IV fluids, and Atropine given with no improvement of heart rate. Pt found hypothermic to 85, dana hugger placed on pt for warming, and warm IV fluids given.     ICU in ED to evaluate pt, requested neuro consult. Case discussed with stroke PA, will evaluate pt. Pt readmitted.

## 2021-12-16 NOTE — BH CHART NOTE - NSBHPTASSESSDT_PSY_A_CORE
11-Dec-2021 02:41
12-Dec-2021 00:00
16-Dec-2021 21:50
08-Dec-2021 17:18
08-Dec-2021 18:15
08-Dec-2021 21:40
09-Dec-2021 02:43
14-Dec-2021 23:45

## 2021-12-16 NOTE — BH INPATIENT PSYCHIATRY PROGRESS NOTE - LEVEL OF CONSCIOUSNESS
Lethargic, arousable to verbal stimulus
Lethargic, arousable to verbal stimulus
Alert
Lethargic, arousable to verbal stimulus
Lethargic, arousable to verbal stimulus
Alert
Lethargic, arousable to verbal stimulus
Lethargic, arousable to verbal stimulus

## 2021-12-16 NOTE — ED PROVIDER NOTE - PROGRESS NOTE DETAILS
Pt has improved heat rate. I was called to RESUS for drop in pt's BP. Pt found to have UTI, was given IV Ceftriaxone, resuscitated with normal saline IV, and peripheral Levophed added secondary to peripheral hypotension. Pt with transient improvement of BP.

## 2021-12-16 NOTE — BH INPATIENT PSYCHIATRY PROGRESS NOTE - PRN MEDS
MEDICATIONS  (PRN):  dibucaine 1% Ointment 1 Application(s) Topical two times a day PRN Hemorrhoids  QUEtiapine 50 milliGRAM(s) Oral four times a day PRN agitation  

## 2021-12-16 NOTE — ED ADULT NURSE NOTE - OBJECTIVE STATEMENT
Pt presented to the ED from [facility] after a rapid response was called. Pt was found to be diaphoretic, bradypneic, bradycardic, and hypotensive. On arrival, HR in the 40s and SBP 130s, pt is drowsy by arousable and able to follow commands. Pt presented to the ED from [facility] after a rapid response was called. Pt was found to be diaphoretic, bradypneic, bradycardic, and hypotensive. On arrival, HR in the 40s and SBP 130s, pt is drowsy but arousable and able to follow commands, unable to obtain a temperature on arrival.

## 2021-12-16 NOTE — ED PROVIDER NOTE - CARE PLAN
1 Principal Discharge DX:	AMS (altered mental status)  Secondary Diagnosis:	Hypothermia, endogenous  Secondary Diagnosis:	Acute UTI

## 2021-12-16 NOTE — BH INPATIENT PSYCHIATRY PROGRESS NOTE - CASE SUMMARY
Continue clozapine 25 mg qhs and depakote. Pt sedated after getting ativan prn again. Will stick to zyprexa prns and sign out to overnight staff. 
Pt more alert today but still disorganized in thought process and behavior. On 1:1 for disorganized behavior and falls risk. On zyprexa and depakote but will consider switching to clozapine when pt less sedated.
Still oversedated after ativan 3 mg. Will try to avoid benzos but had been trying to avoid antipsychotics out of deference to Parkinson's.
Starting clozapine 25 mg qhs for psychosis and mood dysregulation. Pt less sedated today. Spoke with her [employer] regarding her care. 
Spoke at length with pt's sister who told me pt has dealt with mental illness for 40 years. 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 Pt has an extensive hx of falls and sister jann pt is on 1:1. Pt has been diagnosed with Parkinson's and with dementia according to sister. Pt does have 2 kids but is estranged from them. They were not raised by her. Pt got a degree in education in college but did not work much. Pt asked me to take off my glasses and tried to rub my belly like a Buddha. Nursing reported pt has had episodes that sound like delirium (confusion, playing with feces). Will avoid benzos. Zyprexa 5 mg IM seemed to work well last time.

## 2021-12-16 NOTE — BH INPATIENT PSYCHIATRY PROGRESS NOTE - NSTXDISORGGOAL_PSY_ALL_CORE
Will demonstrate related thoughts for 5 min in conversation
Other...
Other...

## 2021-12-16 NOTE — ED PROVIDER NOTE - OBJECTIVE STATEMENT
71 y/o F pt with PMHx of Parkinson's, seizure disorder, and schizophrenia presents to ED from [facility] psych unit, brought down for AMS, increased somnolence, and bradycardia. Pt was given Ativan the night before for increased agitation and psychosis, but since then was noted to be somnolent in unit with decreased arousability. This afternoon, pt noted to still not be at her baseline, with long periods of sleeping, minimal awake periods, and no eating/drinking. Pt missed lunch as well as dinner. Rapid response was called when pt was found to be minimally responsive to tactile stimuli around 8PM. In ED, pt was found to be minimally responsive and hypotensive, bradycardic with improved BP, and somnolent. Pt seen immediately upon arrival secondary to critical condition.

## 2021-12-16 NOTE — CONSULT NOTE ADULT - ATTENDING COMMENTS
Reina Azul [id number]  This is a 69 y/o female with a h/o DM, HTN, seizure disorder, hypothyroidism, Parkinson’s disease, schizophrenia who had multiple admissions for AMS.  She is on Seroquel, clozapine, Zyprexa and also received 2 mg of Ativan.  Her temperature was found=d to be 85Degrees F (rectal and axillary), glu 128, she opens her eyes transiently to sternal rub, HR 45 to 50/min, R pupil dilated (h/o), L pupil constricted, lactate 1, EKG with Slaughter wave, CT head with no acute changes.  -AMS, in coma likely from multiple factors, eg seizure form medications, Parkinson’s, risk for Myxedema coma etc, needs close monitoring and further evaluation with ABG to determine if to intubate her.  Hold all medications and get video EEG, however her valproate level was low so give dose now.  Check TSH, T3, T4, ammonia level is wnl.  Hydrate before re warming as risk for hypotension.  She is critically ill and require immediate intervention.

## 2021-12-16 NOTE — BH INPATIENT PSYCHIATRY PROGRESS NOTE - NSBHADMITMEDEDUDETAILS_PSY_A_CORE FT
yes
pt unable to discuss regimen and treatment
pt unable to discuss regimen and treatment
yes
pt unable to discuss regimen and treatment

## 2021-12-16 NOTE — BH INPATIENT PSYCHIATRY PROGRESS NOTE - NSBHMSEEYE_PSY_A_CORE
staring/Poor
intermittent/Good
staring/Poor
intermittent/Unable to assess

## 2021-12-16 NOTE — BH INPATIENT PSYCHIATRY PROGRESS NOTE - NSTXDISORGINTERMD_PSY_ALL_CORE
Psychopharm management x 15 min daily, depakote and zyprexa, reach out to sister who is proxy

## 2021-12-16 NOTE — BH CHART NOTE - NSEVENTNOTEFT_PSY_ALL_CORE
Patient found by nursing staff to be nonresponsive to verbal stimuli (but responsive to noxious stimuli). Pt was found to be diaphoretic, bradypneic, bradycardic, and hypotensive. Rapid response was called and patient was taken to the ED for evaluation. On arrival to ED, HR in the 40s and SBP 130s
Pt agitated and disruptive. Singing loudly, screaming, refusing PO meds and unable to be redirected. Exceedingly psychotic and convinced her uterine prolapse is actually her giving birth to the Second [place]. Zyprexa 5 mg IM given. 
Pt has uterine prolapse. She is convinced she is giving birth and was tugging at mass. No bleeding. Pt did not report pain. Pt very psychotic. Called GYN consult. Tried to call sister who is her proxy but phone was busy. Pt has hx of bladder cancer. Pt not able to provide much useful history. 1:1 ordered to prevent patient from doing anything dangerous or untoward with prolapse but pt is so psychotic that 1:1 may be warranted anyway. She had been on 1:1 on medical unit at [facility]. Will likely start trial of clozapine. 
pt has been agitated, disorganized and throwing things   has had po meds   total dose of olanzapine po has been 15 mg daily and has had 5 mg  IM when agitated. Tonight again agitated, giving pt an IM dose med. discussed with nurses, and pt agreeable to IM med
Pt agitated and aggressive towards staff. Ativan 3 mg IM given after zyprexa 5 mg IM not effective at all. 
Pt agitated and hypersexual. Wanting to go down corridor naked at 2:30 am. Ativan 1 mg IM given as ativan 3 mg made her oversedated. 
Pt agitated and zyprexa 5 mg IM given earlier and had no effect. Will give ativan 1mg po which worked well last night.

## 2021-12-16 NOTE — BH INPATIENT PSYCHIATRY PROGRESS NOTE - NSBHFUPINTERVALHXFT_PSY_A_CORE
Pt sedated after getting ativan 2 mg PO last night. Will sign out to [employer]lighters & [employer] that pt should not receive benzos, either PO or IM. Zyprexa 5-7.5 mg PO can be given as PRN. Will continue clozapine uptitration when pt less sedtaed.

## 2021-12-16 NOTE — BH INPATIENT PSYCHIATRY PROGRESS NOTE - NSDCCRITERIA_PSY_ALL_CORE
more organized, less paranoid, mood stable, less labile 

## 2021-12-16 NOTE — BH INPATIENT PSYCHIATRY PROGRESS NOTE - NSBHFUPINTERVALCCFT_PSY_A_CORE
Seen for follow up for psychosis
jeaninea
Treating for psychosis
ongoing treatment of shabbir and psychosis
Treating for psychosis
ongoing treatment of shabbir and psychosis

## 2021-12-17 ENCOUNTER — INPATIENT (INPATIENT)
Facility: HOSPITAL | Age: 71
LOS: 5 days | Discharge: NURSING, REHAB OR LONG-TERM CARE | DRG: 871 | End: 2021-12-23
Attending: INTERNAL MEDICINE | Admitting: INTERNAL MEDICINE
Payer: MEDICARE

## 2021-12-17 LAB
ANION GAP SERPL CALC-SCNC: 9 MMOL/L — SIGNIFICANT CHANGE UP (ref 5–17)
BUN SERPL-MCNC: 15 MG/DL — SIGNIFICANT CHANGE UP (ref 7–23)
CALCIUM SERPL-MCNC: 9 MG/DL — SIGNIFICANT CHANGE UP (ref 8.4–10.5)
CHLORIDE SERPL-SCNC: 119 MMOL/L — HIGH (ref 96–108)
CO2 SERPL-SCNC: 19 MMOL/L — LOW (ref 22–31)
CREAT SERPL-MCNC: 0.85 MG/DL — SIGNIFICANT CHANGE UP (ref 0.5–1.3)
GLUCOSE BLDC GLUCOMTR-MCNC: 69 MG/DL — LOW (ref 70–99)
GLUCOSE BLDC GLUCOMTR-MCNC: 71 MG/DL — SIGNIFICANT CHANGE UP (ref 70–99)
GLUCOSE SERPL-MCNC: 69 MG/DL — LOW (ref 70–99)
POTASSIUM SERPL-MCNC: 5 MMOL/L — SIGNIFICANT CHANGE UP (ref 3.5–5.3)
POTASSIUM SERPL-SCNC: 5 MMOL/L — SIGNIFICANT CHANGE UP (ref 3.5–5.3)
SARS-COV-2 RNA SPEC QL NAA+PROBE: SIGNIFICANT CHANGE UP
SODIUM SERPL-SCNC: 147 MMOL/L — HIGH (ref 135–145)

## 2021-12-17 PROCEDURE — 70498 CT ANGIOGRAPHY NECK: CPT | Mod: 26

## 2021-12-17 PROCEDURE — 95720 EEG PHY/QHP EA INCR W/VEEG: CPT

## 2021-12-17 PROCEDURE — 70496 CT ANGIOGRAPHY HEAD: CPT | Mod: 26

## 2021-12-17 PROCEDURE — 99291 CRITICAL CARE FIRST HOUR: CPT

## 2021-12-17 PROCEDURE — 99223 1ST HOSP IP/OBS HIGH 75: CPT

## 2021-12-17 PROCEDURE — 99222 1ST HOSP IP/OBS MODERATE 55: CPT | Mod: GC

## 2021-12-17 RX ORDER — CEFTRIAXONE 500 MG/1
2000 INJECTION, POWDER, FOR SOLUTION INTRAMUSCULAR; INTRAVENOUS EVERY 24 HOURS
Refills: 0 | Status: DISCONTINUED | OUTPATIENT
Start: 2021-12-17 | End: 2021-12-20

## 2021-12-17 RX ORDER — CARBIDOPA AND LEVODOPA 25; 100 MG/1; MG/1
1 TABLET ORAL EVERY 8 HOURS
Refills: 0 | Status: DISCONTINUED | OUTPATIENT
Start: 2021-12-17 | End: 2021-12-19

## 2021-12-17 RX ORDER — CEFTRIAXONE 500 MG/1
1000 INJECTION, POWDER, FOR SOLUTION INTRAMUSCULAR; INTRAVENOUS ONCE
Refills: 0 | Status: COMPLETED | OUTPATIENT
Start: 2021-12-17 | End: 2021-12-17

## 2021-12-17 RX ORDER — VALPROIC ACID (AS SODIUM SALT) 250 MG/5ML
250 SOLUTION, ORAL ORAL EVERY 8 HOURS
Refills: 0 | Status: DISCONTINUED | OUTPATIENT
Start: 2021-12-17 | End: 2021-12-23

## 2021-12-17 RX ORDER — SODIUM CHLORIDE 9 MG/ML
500 INJECTION INTRAMUSCULAR; INTRAVENOUS; SUBCUTANEOUS ONCE
Refills: 0 | Status: DISCONTINUED | OUTPATIENT
Start: 2021-12-17 | End: 2021-12-18

## 2021-12-17 RX ORDER — ATROPINE SULFATE 0.1 MG/ML
1 SYRINGE (ML) INJECTION ONCE
Refills: 0 | Status: COMPLETED | OUTPATIENT
Start: 2021-12-17 | End: 2021-12-16

## 2021-12-17 RX ORDER — CHLORHEXIDINE GLUCONATE 213 G/1000ML
1 SOLUTION TOPICAL
Refills: 0 | Status: DISCONTINUED | OUTPATIENT
Start: 2021-12-17 | End: 2021-12-18

## 2021-12-17 RX ORDER — DEXTROSE 50 % IN WATER 50 %
50 SYRINGE (ML) INTRAVENOUS ONCE
Refills: 0 | Status: COMPLETED | OUTPATIENT
Start: 2021-12-17 | End: 2021-12-17

## 2021-12-17 RX ADMIN — SODIUM CHLORIDE 1000 MILLILITER(S): 9 INJECTION INTRAMUSCULAR; INTRAVENOUS; SUBCUTANEOUS at 01:06

## 2021-12-17 RX ADMIN — CEFTRIAXONE 100 MILLIGRAM(S): 500 INJECTION, POWDER, FOR SOLUTION INTRAMUSCULAR; INTRAVENOUS at 00:00

## 2021-12-17 RX ADMIN — Medication 25.93 MILLIGRAM(S): at 19:14

## 2021-12-17 RX ADMIN — Medication 5 MILLIGRAM(S): at 12:34

## 2021-12-17 RX ADMIN — CHLORHEXIDINE GLUCONATE 1 APPLICATION(S): 213 SOLUTION TOPICAL at 06:08

## 2021-12-17 RX ADMIN — Medication 50 MILLILITER(S): at 16:52

## 2021-12-17 NOTE — PROGRESS NOTE ADULT - ASSESSMENT
70F HTN, T2DM (a1c 6.0), asthma, hypothyroidism, seizure disorder, Parkinson's disease, hypothyroidism, schizophrenia transferred to the ED from [facility] for altered mental status. ICU was consulted for AMS and hypothermia.    NEURO  #AMS  Likely multifactorial - differential includes status epilepticus (as pt with known seizure history, however less likely as normal lactate), advanced dementia/PD, myxedema coms (TFTs just came back nml), polypharmacy, Urosepsis (+UA)  Patient more alert over the course of the day  -avoid sedatives, opiates, benzos  -vEEG  -psych on board recs holding all antipsychotics at this time due to pts altered mental status     #Hypothermia in the setting of antipsychotic polypharmacy or infection   Temp 85--> increasing to 94  - continue to rewarm w/ bear Joann issa   - Patient hypotensive when initially rewarming, requiring pressors     PULM  -currently protecting airway, 100% on RA  -f/u ABG    CARDIOVASCULAR  -fluid bolus as needed  -Hypotensive at times: pt initially requiring levophed, wean as able  -hold home antihypertensives  -Bradycardic at times: keep pacer pads on patient, atropine at bedside  - EKG showing J point elevation and cabrera waves, consistent w/ hypothermia     ID  #UTI (+UA)  -Given Ceftriaxone 1g in the ED  -c/w Ceftriaxone 1g q24h  -f/u urine cx, blood cx    GI  -keep NPO  -bedside dysphagia screen when back to baseline    ENDO  -TFTs wnl  -continue synthroid  -monitor blood glucose  -ISS as needed    HEME  #normocytic anemia (baseline 10-11)  -H/H 10.9/35.8, MCV 87.7  -f/u iron studies    RENAL  # Hyperkalemia  K 5.8 in ED  s/p insulin and dextrose  - f/u repeat bmp    F: None   E: Replete as necessary K>4 Mg>2  N: NPO  DVT Prophylaxis: SCDs  GI prophylaxis: None   CODE STATUS: FULL  DISPO: ICU until mental status improves

## 2021-12-17 NOTE — BH CONSULTATION LIAISON ASSESSMENT NOTE - CASE SUMMARY
69yo woman with a history of schizoaffective d/o and Parkinson's dementia who presents acutely delirious following transfer from [facility] as RRT for AMS with minimal responsiveness, found to have a UTI. Prior medical records reviewed. On my evaluation pt arousable but nonverbal, with occasional grunting/roaring noises, not following commands except briefly squeezed my hand, unable to further participate in assessment. Unclear if any other acute contributing factors; observed to have adverse response with oversedation to l[facility]azepam [facility], brief introduction of clozapine over last several days unlikely to cause sx. Presentation not suggestive of NMS but recommend holding standing antipsychotics and benzodiazepines given current hypoactive delirium of uncertain etiology. Encourage ongoing medical w/u. For acute agitation, recommend olanzapine PRN - would start low but may require higher doses given prior reported degree of shabbir, psychosis, and aggression. 1:1 observation for safety, agitation risk. Will follow. D/w primary team and [facility] team.

## 2021-12-17 NOTE — H&P ADULT - ATTENDING COMMENTS
This patient was already seen with the residents as an ICU consult, please refer to that note for the details, the billing was done on that note.

## 2021-12-17 NOTE — DIETITIAN INITIAL EVALUATION ADULT. - ENTERAL
If EN is warranted, recommend EN via NGT: Glucerna 1.2 @ 10 mL/hr increasing by 10 mL q6h to goal rate of 50 mL/hr x 24 hours. This provides: 1200 mL TV, 1440 kcal, 72 g protein, 966 mL free water. Meetin kcal/kg, 1.4 g/kg protein based on IBW 49.8 kg. Defer fluids to team. Maintain aspiration precautions. Monitor s/s of intolerance; adjust EN as needed.

## 2021-12-17 NOTE — H&P ADULT - NSHPPHYSICALEXAM_GEN_ALL_CORE
VITAL SIGNS:  T(C): 34.5 (12-17-21 @ 02:35), Max: 34.5 (12-17-21 @ 02:35)  T(F): 94.1 (12-17-21 @ 02:35), Max: 94.1 (12-17-21 @ 02:35)  HR: 95 (12-17-21 @ 02:35) (37 - 95)  BP: 113/67 (12-17-21 @ 02:35) (50/30 - 137/81)  BP(mean): 84 (12-17-21 @ 02:35) (84 - 84)  RR: 18 (12-17-21 @ 02:35) (16 - 18)  SpO2: 98% (12-17-21 @ 02:35) (97% - 100%)    PHYSICAL EXAM:    GENERAL: minimally responsive only to noxious stimuli, not in acute distress, protecting airway  HEAD:  Atraumatic, Normocephalic  EYES: anisocoria (R dilated, L constricted) conjunctiva and sclera clear  NECK: Supple, No JVD  CHEST/LUNG: Clear to auscultation bilaterally; No wheeze  HEART: bradycardic, regular rhythm; No murmurs, rubs, or gallops  ABDOMEN: Soft, Nontender, Nondistended; Bowel sounds present  EXTREMITIES:  2+ Peripheral Pulses, No clubbing, cyanosis, or edema, rigid extremities  NEUROLOGY: AAOx0, responsive only to noxious stimuli, +gag reflex  SKIN: No rashes or lesions, cool to touch

## 2021-12-17 NOTE — PATIENT PROFILE ADULT - FALL HARM RISK - HARM RISK INTERVENTIONS
Assistance with ambulation/Assistance OOB with selected safe patient handling equipment/Communicate Risk of Fall with Harm to all staff/Discuss with provider need for PT consult/Monitor gait and stability/Reinforce activity limits and safety measures with patient and family/Review medications for side effects contributing to fall risk/Sit up slowly, dangle for a short time, stand at bedside before walking/Tailored Fall Risk Interventions/Toileting schedule using arm’s reach rule for commode and bathroom/Visual Cue: Yellow wristband and red socks/Bed in lowest position, wheels locked, appropriate side rails in place/Call bell, personal items and telephone in reach/Instruct patient to call for assistance before getting out of bed or chair/Non-slip footwear when patient is out of bed/[place] to call system/Physically safe environment - no spills, clutter or unnecessary equipment/Purposeful Proactive Rounding/Room/bathroom lighting operational, light cord in reach

## 2021-12-17 NOTE — ED ADULT NURSE REASSESSMENT NOTE - NS ED NURSE REASSESS COMMENT FT1
As per ICU MD, ok to bring pt to floor with CT scan. As per ICU MD, ok to bring pt to floor without CT scan.

## 2021-12-17 NOTE — BH CONSULTATION LIAISON ASSESSMENT NOTE - OTHER PAST PSYCHIATRIC HISTORY (INCLUDE DETAILS REGARDING ONSET, COURSE OF ILLNESS, INPATIENT/OUTPATIENT TREATMENT)
Per chart, long hx of Schizophrenia/schizophrenia with multiple past inpatient hospitalizations, many at [facility] last in October-November 2021 for shabbir and psychosis prior to transfer [facility] for medical evaluation after found unresponsive.

## 2021-12-17 NOTE — H&P ADULT - HISTORY OF PRESENT ILLNESS
70F HTN, T2DM (a1c 6.0), asthma, hypothyroidism, seizure disorder, Parkinson's disease, hypothyroidism, schizophrenia transferred to the ED from [facility] for altered mental status. Pt. has had a prolonged hospital course, as she was initially transferred to [facility] from [facility] on 11/21 for lethargy and altered mental status. The patient underwent a comprehensive neuro/stroke workup with CTH, CTA head and neck and CT perfusion being grossly negative for any acute pathology. The patient was then transferred to [facility] on 12/7 for active psychosis and schizophrenia requiring physical and chemical restraints. While on [facility], the patient was placed on multiple different psychotropic medications. Per aide at bedside who has worked with patient since her admission to [facility], patient has not been her usual self today. Pt is normally active, agitated at baseline, however was noted to be lethargic and somnolent today. Pt reportedly received a PRN Zyprexa and 2mg ativan the night before, and has been sleeping in bed all morning. She missed her meals and medications, so when the aide went to wake her up around 8pm, pt was minimally responsive, bradycardic and cool to touch. A rapid response was called, and pt was brought to the ED. Unable to obtain History/ROS as pt obtunded and responsive only to noxious stimuli    ED Vitals: T85-->91.5 (on dana hugger), HR 45-50-->85, BP 50-130s/30-70s, RR 12-16 SpO2 %  ED Labs: WBC 4.08, H/H 10.9/35.8, plt 140, K 5.8, BUN/Cr 21/0.74, Lactate 1.0, Valproic Acid 43.8, Ammonia 28  CXR: no consolidations or effusions noted  EKG: sinus bradycardia w/ J point elevation (cabrera wave)   CTH: no acute pathology  ED Interventions: Atropine 0.5, 5U Insulin, D50, levophed, 4L NS

## 2021-12-17 NOTE — PROVIDER CONTACT NOTE (HYPOGLYCEMIA EVENT) - NS PROVIDER CONTACT BACKGROUND-HYPO
Age: 70y    Gender: Female    POCT Blood Glucose:  69 mg/dL (12-17-21 @ 16:32)  71 mg/dL (12-17-21 @ 11:34)  250 mg/dL (12-16-21 @ 23:03)  104 mg/dL (12-16-21 @ 20:53)  108 mg/dL (12-16-21 @ 20:29)      eMAR:  dextrose 50% Injectable   50 milliLiter(s) IV Push (12-16-21 @ 22:24)    insulin regular  human recombinant   5 Unit(s) IV Push (12-16-21 @ 22:24)

## 2021-12-17 NOTE — BH CONSULTATION LIAISON ASSESSMENT NOTE - RISK ASSESSMENT
At low acute risk for suicide - no known current SI or recent SI, in highly monitored setting. Chronic risk factors include h/o psychotic disorder. Modifiable risk factors include acute medical illness with infection  At moderate to high acute risk of inadvertent harm to others given confusional state with recent agitation, shabbir and psychosis

## 2021-12-17 NOTE — H&P ADULT - ASSESSMENT
70F HTN, T2DM (a1c 6.0), asthma, hypothyroidism, seizure disorder, Parkinson's disease, hypothyroidism, schizophrenia transferred to the ED from [facility] for altered mental status. ICU was consulted for AMS and hypothermia.      #hypothermia  #bradycardia  #anemia  #hyperkalemia    NEURO  #AMS  Likely multifactorial - differential includes status epilepticus (as pt with known seizure history, however less likely as normal lactate), advanced dementia/PD, myxedema coms (TFTs just came back nml), polypharmacy, Urosepsis (+UA)  Patient more awake in the MICU, still not communicating but opens her eyes more   -avoid sedatives, opiates, benzos  -f/u Utox  -vEEG  -hold antipsychotics  -consult Psych    #Hypothermia in the setting of antipsychotic polypharmacy or infection   Temp 85--> increasing to 94  - continue to rewarm w/ bear hugger, IVFs   - Patient hypotensive when initially rewarming, requiring pressors     PULM  -currently protecting airway, 100% on RA  -f/u ABG    CARDIOVASCULAR  -fluid bolus as needed  -Hypotensive at times: pt initially requiring levophed, wean as able  -hold home antihypertensives  -Bradycardic at times: keep pacer pads on patient, atropine at bedside  - EKG showing J point elevation and cabrera waves, consistent w/ hypothermia     ID  #UTI (+UA)  -Given Ceftriaxone 1g in the ED  -c/w Ceftriaxone 1g q24h  -f/u urine cx, blood cx    GI  -keep NPO  -bedside dysphagia screen when back to baseline    ENDO  -TFTs wnl  -continue synthroid  -monitor blood glucose  -ISS as needed    HEME  #normocytic anemia (baseline 10-11)  -H/H 10.9/35.8, MCV 87.7  -f/u iron studies    RENAL  # Hyperkalemia  K 5.8 in ED  s/p insulin and dextrose  - ctm     F: None   E: Replete as necessary K>4 Mg>2  N: NPO  DVT Prophylaxis: SCDs  GI prophylaxis: None   CODE STATUS: FULL  DISPO: ICU until mental status improves   70F HTN, T2DM (a1c 6.0), asthma, hypothyroidism, seizure disorder, Parkinson's disease, hypothyroidism, schizophrenia transferred to the ED from [facility] for altered mental status. ICU was consulted for AMS and hypothermia.    NEURO  #AMS  Likely multifactorial - differential includes status epilepticus (as pt with known seizure history, however less likely as normal lactate), advanced dementia/PD, myxedema coms (TFTs just came back nml), polypharmacy, Urosepsis (+UA)  Patient more awake in the MICU, still not communicating but opens her eyes more   -avoid sedatives, opiates, benzos  -f/u Utox  -vEEG  -hold antipsychotics  -consult Psych    #Hypothermia in the setting of antipsychotic polypharmacy or infection   Temp 85--> increasing to 94  - continue to rewarm w/ bear Joann issa   - Patient hypotensive when initially rewarming, requiring pressors     PULM  -currently protecting airway, 100% on RA  -f/u ABG    CARDIOVASCULAR  -fluid bolus as needed  -Hypotensive at times: pt initially requiring levophed, wean as able  -hold home antihypertensives  -Bradycardic at times: keep pacer pads on patient, atropine at bedside  - EKG showing J point elevation and cabrera waves, consistent w/ hypothermia     ID  #UTI (+UA)  -Given Ceftriaxone 1g in the ED  -c/w Ceftriaxone 1g q24h  -f/u urine cx, blood cx    GI  -keep NPO  -bedside dysphagia screen when back to baseline    ENDO  -TFTs wnl  -continue synthroid  -monitor blood glucose  -ISS as needed    HEME  #normocytic anemia (baseline 10-11)  -H/H 10.9/35.8, MCV 87.7  -f/u iron studies    RENAL  # Hyperkalemia  K 5.8 in ED  s/p insulin and dextrose  - ctm     F: None   E: Replete as necessary K>4 Mg>2  N: NPO  DVT Prophylaxis: SCDs  GI prophylaxis: None   CODE STATUS: FULL  DISPO: ICU until mental status improves

## 2021-12-17 NOTE — BH CONSULTATION LIAISON ASSESSMENT NOTE - SUMMARY
70F HTN, T2DM (a1c 6.0), asthma, hypothyroidism, seizure disorder, Parkinson's disease, schizophrenia transferred to the ED from [facility] for altered mental status. pt was found last night minimally responsive, bradycardiac and cool to touch. A rapid response was called, and pt was brought to the ED. As per in-patient psychiatry unit, patient has multiple hospitalizations and presented with decompensated schizophrenia, with episodes of agitation, paranoia, grandiose delusions, irritability, with elevated mood and auditory and visual hallucinations. Patient was most recently on Olanzapine 5mg AM and 10mg PM, Depakote 500mg AM + 750mg QHS, Benztropine 0.25mg BID, and Seroquel 50mg Q6 PRN for agitation. On 12/15/21 patient was started on Clozapine 25mg QHS as she continued to be actively manic and psychotic. Pt previously on numerous antipsychotic medications, history of recurrent UTIs, and current positive UA being treated with ceftriaxone. Pt seen bedside in MICU. Pt was obtunded and lethargic, arousable to verbal stimulus but unable to follow one step commands. AAOx0. Unable to assess mood, thought process/content, perceptions, or cognition. AMS most likely in the setting of polypharmacy and underlying UTI.   70F HTN, T2DM (a1c 6.0), asthma, hypothyroidism, seizure disorder, Parkinson's disease, schizophrenia transferred to the ED from [facility] for altered mental status. pt was found last night minimally responsive, bradycardiac and cool to touch. A rapid response was called, and pt was brought to the ED. As per in-patient psychiatry unit, patient has multiple hospitalizations and presented with decompensated schizophrenia, with episodes of agitation, paranoia, grandiose delusions, irritability, with elevated mood and auditory and visual hallucinations. Patient was most recently on Olanzapine 5mg AM and 10mg PM, Depakote 500mg AM + 750mg QHS, Benztropine 0.25mg BID, and Seroquel 50mg Q6 PRN for agitation. On 12/15/21 patient was started on Clozapine 25mg QHS as she continued to be actively manic and psychotic. Pt previously on numerous antipsychotic medications, history of recurrent UTIs, and current positive UA being treated with ceftriaxone. Pt seen bedside in MICU. Pt was obtunded and lethargic, arousable to verbal stimulus but unable to follow one step commands. AAOx0. Unable to assess mood, thought process/content, perceptions, or cognition. Patient presentation most consistent with acute delirium

## 2021-12-17 NOTE — PROVIDER CONTACT NOTE (OTHER) - ACTION/TREATMENT ORDERED:
Will continue to monitor arm. IV levophed titrated down, will be able to turn off if patient tolerates. No new orders from MD team at this time.

## 2021-12-17 NOTE — BH CONSULTATION LIAISON ASSESSMENT NOTE - NSBHCONSULTRECOMMENDOTHER_PSY_A_CORE FT
-Continue to hold antipsychotic meds  -Continue to hold benzodiazepines   -Zyprexa PRN for agitation  -Continue to hold antipsychotic meds in setting of acute largely hypoactive delirium  -Continue to hold benzodiazepines given concern for prior adverse response with oversedation  -Zyprexa 2.5mg Po/IM Q6H PRN for agitation. Monitor for QTc prolongation. May need upward titration given prior level of agitation  -Continue to hold antipsychotic meds in setting of acute largely hypoactive delirium  -Continue to hold benzodiazepines given concern for prior adverse response with oversedation  -VPA use as per medical/neurology service as has been used as AED  -Zyprexa 2.5mg Po/IM Q6H PRN for agitation. Monitor for QTc prolongation. May need upward titration given prior level of agitation

## 2021-12-17 NOTE — PROVIDER CONTACT NOTE (HYPOGLYCEMIA EVENT) - NS PROVIDER CONTACT SITUATION-HYPO
Doctor made aware of fingerstick value, patient NPO. Patient asymptomatic. Will continue to monitor. IV dextrose ordered.

## 2021-12-17 NOTE — H&P ADULT - NSHPLABSRESULTS_GEN_ALL_CORE
LABS:                         10.9   4.08  )-----------( 140      ( 16 Dec 2021 21:03 )             35.8     12-16    141  |  110<H>  |  21  ----------------------------<  128<H>  5.8<H>   |  25  |  0.74    Ca    9.6      16 Dec 2021 21:03    TPro  7.0  /  Alb  3.2<L>  /  TBili  <0.2  /  DBili  x   /  AST  37  /  ALT  <5<L>  /  AlkPhos  87  12-16    PT/INR - ( 16 Dec 2021 21:21 )   PT: 14.5 sec;   INR: 1.22          PTT - ( 16 Dec 2021 21:21 )  PTT:55.8 sec  Urinalysis Basic - ( 16 Dec 2021 22:07 )    Color: Yellow / Appearance: Cloudy / S.025 / pH: x  Gluc: x / Ketone: Trace mg/dL  / Bili: Negative / Urobili: 0.2 E.U./dL   Blood: x / Protein: 100 mg/dL / Nitrite: POSITIVE   Leuk Esterase: Large / RBC: 5-10 /HPF / WBC Many /HPF   Sq Epi: x / Non Sq Epi: 0-5 /HPF / Bacteria: Many /HPF    CARDIAC MARKERS ( 16 Dec 2021 21:03 )  x     / <0.01 ng/mL / 143 U/L / x     / 13.7 ng/mL    Lactate, Blood: 1.0 mmol/L ( @ 21:02)

## 2021-12-17 NOTE — BH CONSULTATION LIAISON ASSESSMENT NOTE - HPI (INCLUDE ILLNESS QUALITY, SEVERITY, DURATION, TIMING, CONTEXT, MODIFYING FACTORS, ASSOCIATED SIGNS AND SYMPTOMS)
As per primary team, 70F HTN, T2DM (a1c 6.0), asthma, hypothyroidism, seizure disorder, Parkinson's disease, schizophrenia transferred to the ED from [facility]  for altered mental status. Pt has prolonged hospital course, as she was initially transferred to [facility] from [facility] on 11/21 for lethargy and altered mental status. The patient underwent a comprehensive neuro/stroke workup with CTH, CTA head and neck, and CT perfusion being grossly negative for any acute pathology. The patient was then transferred to [facility] on 12/7 for active psychosis and schizophrenia requiring physical and chemical resistants. While on [facility], the patient was placed on multiple different psychotropic medications. Per aide at bedside who has worked with patient since her admission to [facility], patient has not been her usual self today. Pt is normally active, agitated at baseline, however was noted to be lethargic and somnolent today. Pt reportedly received a PRN Zyprexa and 2mg ativan the night before, and has been sleeping in bed all morning. She missed her meals and medications, so when the aide went to wake her up around 8pm, pt was minimally responsive, bradycardiac and cool to touch. A rapid response was called, and pt was brought to the ED. Unable to obtain History/ROS as pt obtunded and responsive only to noxious stimuli.    As per in-patient psychiatry unit, patient has multiple hospitalizations and presented with decompensated schizophrenia, with episodes of agitation, paranoia, grandiose delusions, irritability, with elevated mood and auditory and visual hallucinations. Patient was most recently on Olanzapine 5mg AM and 10mg PM, Depakote 500mg AM + 750mg QHS, Benztropine 0.25mg BID, and Seroquel 50mg Q6 PRN for agitation. On 12/15/21 patient was started on Clozapine 25mg QHS as she continued to be actively manic and psychotic.    Patient previously on numerous antipsychotic medications and has history of recurrent UTIs. Pt seen bedside in MICU. Pt was obtunded and lethargic, arousable to verbal stimulus but unable to follow one step commands. AAOx0. Unable to assess mood, thought process/content, perceptions, or cognition.

## 2021-12-17 NOTE — DIETITIAN NUTRITION RISK NOTIFICATION - ADDITIONAL COMMENTS/DIETITIAN RECOMMENDATIONS
Enteral Recommendations: If EN is warranted, recommend EN via NGT: Glucerna 1.2 @ 10 mL/hr increasing by 10 mL q6h to goal rate of 50 mL/hr x 24 hours. This provides: 1200 mL TV, 1440 kcal, 72 g protein, 966 mL free water. Meetin kcal/kg, 1.4 g/kg protein based on IBW 49.8 kg. Defer fluids to team. Maintain aspiration precautions. Monitor s/s of intolerance; adjust EN as needed.        1. If EN is warranted, see nutrition recommendations above   2. Consult RD for additional recommendations (If PO diet initiated consider SLP eval; recommend Consistent Carbohydrate Diet upon diet advancement)   3. RD to obtain subjective information/provide diet ed as able   4. Pain and bowel per team   5. Monitor electrolytes; replete PRN   6. Malnutrition notice placed *d/w team*

## 2021-12-17 NOTE — BH CONSULTATION LIAISON ASSESSMENT NOTE - NSBHREFERDETAILS_PSY_A_CORE_FT
70F with Parkinson's agitation s/p RRT for AMS with minimal responsiveness, found to have a UTI and undergoing additional workup.

## 2021-12-17 NOTE — DIETITIAN INITIAL EVALUATION ADULT. - OTHER CALCULATIONS
Based on Standards of Care pt <100% IBW thus ideal body weight used for all calculations. Needs adjusted for advanced age and suspected malnutrition.

## 2021-12-17 NOTE — PATIENT PROFILE ADULT - NUMBER OF YRS
Contacted and spoke with patient.     Scheduled f/u appt on 2/11/2021 at 10:00 am at [facility] per Dr. Pratibha De Jesus request. 0

## 2021-12-17 NOTE — PROVIDER CONTACT NOTE (CHANGE IN STATUS NOTIFICATION) - SITUATION
Patient was found somnolent poor response to chest rub, nonverbal, cold to touch,  unable to obtain Hr.  RRT called due to change in patient condition

## 2021-12-17 NOTE — BH CONSULTATION LIAISON ASSESSMENT NOTE - CURRENT MEDICATION
MEDICATIONS  (STANDING):  chlorhexidine 2% Cloths 1 Application(s) Topical <User Schedule>  norepinephrine Infusion 0.05 MICROgram(s)/kG/Min (4.26 mL/Hr) IV Continuous <Continuous>  phentolamine Injectable 5 milliGRAM(s) SubCutaneous once    MEDICATIONS  (PRN):

## 2021-12-17 NOTE — DIETITIAN INITIAL EVALUATION ADULT. - ADD RECOMMEND
1. If EN is warranted, see nutrition recommendations above 2. Consult RD for additional recommendations (If PO diet initiated consider SLP eval; recommend Consistent Carbohydrate Diet upon diet advancement) 3. RD to obtain subjective information/provide diet ed as able 4. Pain and bowel per team 5. Monitor electrolytes; replete PRN 6. Malnutrition notice placed *d/w team*

## 2021-12-17 NOTE — PROGRESS NOTE ADULT - SUBJECTIVE AND OBJECTIVE BOX
70F HTN, T2DM (a1c 6.0), asthma, hypothyroidism, seizure disorder, Parkinson's disease, hypothyroidism, schizophrenia transferred to the ED from [facility] for altered mental status. Pt. has had a prolonged hospital course, as she was initially transferred to [facility] from [facility] on  for lethargy and altered mental status. The patient underwent a comprehensive neuro/stroke workup with CTH, CTA head and neck and CT perfusion being grossly negative for any acute pathology. The patient was then transferred to [facility] on  for active psychosis and schizophrenia requiring physical and chemical restraints. While on , the patient was placed on multiple different psychotropic medications. Per aide at bedside who has worked with patient since her admission to , patient has not been her usual self today. Pt is normally active, agitated at baseline, however was noted to be lethargic and somnolent today. Pt reportedly received a PRN Zyprexa and 2mg ativan the night before, and has been sleeping in bed all morning. She missed her meals and medications, so when the aide went to wake her up around 8pm, pt was minimally responsive, bradycardic and cool to touch. A rapid response was called, and pt was brought to the ED. Unable to obtain History/ROS as pt obtunded and responsive only to noxious stimuli. Patient admitted to MICU for further management. Patient started on levophed for hypotension and given ceftriaxone for positive UA. Patients mental status improved responds yes to wanting to talk to her  sister but no further improvement. Patient weaned off pressors at this time.  CC: Patient is a 70y old  Female who presents with a chief complaint of     INTERVAL EVENTS: MARK  SUBJECTIVE / INTERVAL HPI: Patient seen and examined at bedside. Patient responds yes when asked if she would like to speak to her siste rbut does not verbally respond otherwise    ROS: negative unless otherwise stated above.    VITAL SIGNS:  Vital Signs Last 24 Hrs  T(C): 36.6 (17 Dec 2021 09:56), Max: 36.6 (17 Dec 2021 06:00)  T(F): 97.8 (17 Dec 2021 09:56), Max: 97.8 (17 Dec 2021 06:00)  HR: 94 (17 Dec 2021 13:00) (37 - 111)  BP: 96/55 (17 Dec 2021 13:00) (50/30 - 137/81)  BP(mean): 69 (17 Dec 2021 13:00) (63 - 96)  RR: 12 (17 Dec 2021 13:00) (12 - 31)  SpO2: 100% (17 Dec 2021 13:00) (90% - 100%)      21 @ 07:01  -  21 @ 07:00  --------------------------------------------------------  IN: 83 mL / OUT: 410 mL / NET: -327 mL    21 @ 07:01  -  21 @ 14:10  --------------------------------------------------------  IN: 33 mL / OUT: 550 mL / NET: -517 mL        PHYSICAL EXAM:    General: NAD  HEENT: dry mucous membranes  Cardiovascular: +S1/S2; RRR  Respiratory: CTA B/L  Gastrointestinal: ND  Extremities: WWP; no edema  Neurological: unable to assess     MEDICATIONS:  MEDICATIONS  (STANDING):  chlorhexidine 2% Cloths 1 Application(s) Topical <User Schedule>  norepinephrine Infusion 0.05 MICROgram(s)/kG/Min (4.26 mL/Hr) IV Continuous <Continuous>    MEDICATIONS  (PRN):      ALLERGIES:  Allergies    penicillin (Hives; Rash)    Intolerances        LABS:                        10.9   4.08  )-----------( 140      ( 16 Dec 2021 21:03 )             35.8     12-16    141  |  110<H>  |  21  ----------------------------<  128<H>  5.8<H>   |  25  |  0.74    Ca    9.6      16 Dec 2021 21:03    TPro  7.0  /  Alb  3.2<L>  /  TBili  <0.2  /  DBili  x   /  AST  37  /  ALT  <5<L>  /  AlkPhos  87  12-    PT/INR - ( 16 Dec 2021 21:21 )   PT: 14.5 sec;   INR: 1.22          PTT - ( 16 Dec 2021 21:21 )  PTT:55.8 sec  Urinalysis Basic - ( 16 Dec 2021 22:07 )    Color: Yellow / Appearance: Cloudy / S.025 / pH: x  Gluc: x / Ketone: Trace mg/dL  / Bili: Negative / Urobili: 0.2 E.U./dL   Blood: x / Protein: 100 mg/dL / Nitrite: POSITIVE   Leuk Esterase: Large / RBC: 5-10 /HPF / WBC Many /HPF   Sq Epi: x / Non Sq Epi: 0-5 /HPF / Bacteria: Many /HPF      CAPILLARY BLOOD GLUCOSE      POCT Blood Glucose.: 71 mg/dL (17 Dec 2021 11:34)      RADIOLOGY & ADDITIONAL TESTS: Reviewed.

## 2021-12-17 NOTE — DIETITIAN INITIAL EVALUATION ADULT. - OTHER INFO
70F HTN, T2DM (a1c 6.0), asthma, hypothyroidism, seizure disorder, Parkinson's disease, hypothyroidism, schizophrenia transferred to the ED from [facility] for altered mental status. ICU was consulted for AMS and hypothermia. Likely multifactorial - differential includes status epilepticus (as pt with known seizure history, however less likely as normal lactate), advanced dementia/PD, myxedema coms (TFTs just came back nml), polypharmacy, Urosepsis (+UA).    Pt seen at bedside for initial assessment- not engaging in RD interview thus interview deferred to [facility] RD notes, team and EMR. NKFA documented. Per previous RD, pt w/ fair PO intake on [facility] able to complete ~50% of meals + occasionally drinking ONS (Glucerna). Dosing weight 100 pounds. Per EMR, pt 128 pounds 11/29/21; 22% weight loss x 1 month, clinically significant.  No edema documented at this time. No pressure ulcers documented at this time. Matthew score=8. Labs reviewed 12/17; pt  hyperkalemic 12/16. All other electrolytes within normal limits at this time. Fingersticks 12/16-12/17:  mg/dL. Observed pt w/ mild wasting in temple region; unable to perform full nutrition focused physical exam as team at bedside providing care. Based on ASPEN guidelines, pt meets criteria for moderate malnutrition. Pt NPO at this time; per team likely plan to place NGT pending mental status. RD to follow up per protocol. See nutrition recommendations below.

## 2021-12-17 NOTE — BH CONSULTATION LIAISON ASSESSMENT NOTE - NSBHADMITCOORDCAREOTHER_PSY_A_CORE FT
Complex care discussions with MICU team and [facility] team for psychotropic management, w/u of AMS, disposition planning

## 2021-12-17 NOTE — BH CONSULTATION LIAISON ASSESSMENT NOTE - NSBHCHARTREVIEWINVESTIGATE_PSY_A_CORE FT
CT HEAD 12/16  IMPRESSION:  No acute intracranial hemorrhage, transcortical infarct, or mass effect.

## 2021-12-17 NOTE — BH CONSULTATION LIAISON ASSESSMENT NOTE - OTHER
Chart review; [facility], primary team  involuntary tongue movements briefly squeezed hand (right side) on command otherwise did not follow commands mildly increased tone in bilateral UE lethargic At one point opened eyes and made a roaring noise, otherwise blunted affect poor, overall lethargic, odd (roaring noises at one point in interview)

## 2021-12-17 NOTE — BH CONSULTATION LIAISON ASSESSMENT NOTE - NSBHCHARTREVIEWLAB_PSY_A_CORE FT
10.9   4.08  )-----------( 140      ( 16 Dec 2021 21:03 )             35.8     12-17    147<H>  |  119<H>  |  15  ----------------------------<  69<L>  5.0   |  19<L>  |  0.85    Ca    9.0      17 Dec 2021 15:10    TPro  7.0  /  Alb  3.2<L>  /  TBili  <0.2  /  DBili  x   /  AST  37  /  ALT  <5<L>  /  AlkPhos  87  12-16    CK WNL  VPA 12/16 43.8

## 2021-12-17 NOTE — BH CONSULTATION LIAISON ASSESSMENT NOTE - NSBHCHARTREVIEWVS_PSY_A_CORE FT
Vital Signs Last 24 Hrs  T(C): 36.6 (17 Dec 2021 09:56), Max: 36.6 (17 Dec 2021 06:00)  T(F): 97.8 (17 Dec 2021 09:56), Max: 97.8 (17 Dec 2021 06:00)  HR: 104 (17 Dec 2021 11:00) (37 - 111)  BP: 102/62 (17 Dec 2021 11:00) (50/30 - 137/81)  BP(mean): 76 (17 Dec 2021 11:00) (63 - 96)  RR: 19 (17 Dec 2021 11:00) (12 - 31)  SpO2: 100% (17 Dec 2021 11:00) (90% - 100%)

## 2021-12-17 NOTE — DIETITIAN INITIAL EVALUATION ADULT. - PERTINENT LABORATORY DATA
12/16  Sodium, Serum: 141 mmol/L  Potassium, Serum: 5.8 mmol/L (Elevated)  Chloride, Serum: 110 mmol/L (Elevated)  BUN, Serum:21 mg/dL  Creatinine, Serum: 0.74 mg/dL  Glucose, Serum: 128 mg/dL (Elevated)  Calcium, Serum: 9.6 mg/dL    Last HbA1c 6.0% (12/9); noted h/o diabetes     Fingersticks 12/16-12/17 12/16: 108 mg/dL  12/16: 108 mg/dL  12/16: 104 mg/dL  12/16: 250 mg/dL  12/17: 71 mg/dL      Lipid Profile (12/9)  Cholesterol, Serum: 133 mg/dL  Triglycerides, Serum: 37 mg/dL  HDL Cholesterol, Serum: 67 mg/dL   LDL Cholesterol Calculated: 59 mg/dL

## 2021-12-18 LAB
-  AMPICILLIN/SULBACTAM: SIGNIFICANT CHANGE UP
-  AMPICILLIN: SIGNIFICANT CHANGE UP
-  CEFAZOLIN: SIGNIFICANT CHANGE UP
-  CEFTRIAXONE: SIGNIFICANT CHANGE UP
-  CIPROFLOXACIN: SIGNIFICANT CHANGE UP
-  ERTAPENEM: SIGNIFICANT CHANGE UP
-  GENTAMICIN: SIGNIFICANT CHANGE UP
-  NITROFURANTOIN: SIGNIFICANT CHANGE UP
-  PIPERACILLIN/TAZOBACTAM: SIGNIFICANT CHANGE UP
-  TOBRAMYCIN: SIGNIFICANT CHANGE UP
-  TRIMETHOPRIM/SULFAMETHOXAZOLE: SIGNIFICANT CHANGE UP
ALBUMIN SERPL ELPH-MCNC: 2.7 G/DL — LOW (ref 3.3–5)
ALP SERPL-CCNC: 77 U/L — SIGNIFICANT CHANGE UP (ref 40–120)
ALT FLD-CCNC: 29 U/L — SIGNIFICANT CHANGE UP (ref 10–45)
ANION GAP SERPL CALC-SCNC: 6 MMOL/L — SIGNIFICANT CHANGE UP (ref 5–17)
ANION GAP SERPL CALC-SCNC: 7 MMOL/L — SIGNIFICANT CHANGE UP (ref 5–17)
APTT BLD: 51 SEC — HIGH (ref 27.5–35.5)
AST SERPL-CCNC: 36 U/L — SIGNIFICANT CHANGE UP (ref 10–40)
BILIRUB SERPL-MCNC: <0.2 MG/DL — SIGNIFICANT CHANGE UP (ref 0.2–1.2)
BUN SERPL-MCNC: 16 MG/DL — SIGNIFICANT CHANGE UP (ref 7–23)
BUN SERPL-MCNC: 17 MG/DL — SIGNIFICANT CHANGE UP (ref 7–23)
CALCIUM SERPL-MCNC: 8.8 MG/DL — SIGNIFICANT CHANGE UP (ref 8.4–10.5)
CALCIUM SERPL-MCNC: 9.3 MG/DL — SIGNIFICANT CHANGE UP (ref 8.4–10.5)
CHLORIDE SERPL-SCNC: 116 MMOL/L — HIGH (ref 96–108)
CHLORIDE SERPL-SCNC: 121 MMOL/L — HIGH (ref 96–108)
CO2 SERPL-SCNC: 21 MMOL/L — LOW (ref 22–31)
CO2 SERPL-SCNC: 23 MMOL/L — SIGNIFICANT CHANGE UP (ref 22–31)
CREAT SERPL-MCNC: 1.02 MG/DL — SIGNIFICANT CHANGE UP (ref 0.5–1.3)
CREAT SERPL-MCNC: 1.02 MG/DL — SIGNIFICANT CHANGE UP (ref 0.5–1.3)
CULTURE RESULTS: SIGNIFICANT CHANGE UP
GLUCOSE BLDC GLUCOMTR-MCNC: 180 MG/DL — HIGH (ref 70–99)
GLUCOSE SERPL-MCNC: 63 MG/DL — LOW (ref 70–99)
GLUCOSE SERPL-MCNC: 70 MG/DL — SIGNIFICANT CHANGE UP (ref 70–99)
HCT VFR BLD CALC: 32.9 % — LOW (ref 34.5–45)
HGB BLD-MCNC: 10.4 G/DL — LOW (ref 11.5–15.5)
INR BLD: 1.37 — HIGH (ref 0.88–1.16)
MAGNESIUM SERPL-MCNC: 2 MG/DL — SIGNIFICANT CHANGE UP (ref 1.6–2.6)
MAGNESIUM SERPL-MCNC: 2 MG/DL — SIGNIFICANT CHANGE UP (ref 1.6–2.6)
MCHC RBC-ENTMCNC: 28.3 PG — SIGNIFICANT CHANGE UP (ref 27–34)
MCHC RBC-ENTMCNC: 31.6 GM/DL — LOW (ref 32–36)
MCV RBC AUTO: 89.4 FL — SIGNIFICANT CHANGE UP (ref 80–100)
METHOD TYPE: SIGNIFICANT CHANGE UP
NRBC # BLD: 0 /100 WBCS — SIGNIFICANT CHANGE UP (ref 0–0)
ORGANISM # SPEC MICROSCOPIC CNT: SIGNIFICANT CHANGE UP
ORGANISM # SPEC MICROSCOPIC CNT: SIGNIFICANT CHANGE UP
PHOSPHATE SERPL-MCNC: 2.9 MG/DL — SIGNIFICANT CHANGE UP (ref 2.5–4.5)
PHOSPHATE SERPL-MCNC: 3.4 MG/DL — SIGNIFICANT CHANGE UP (ref 2.5–4.5)
PLATELET # BLD AUTO: 125 K/UL — LOW (ref 150–400)
POTASSIUM SERPL-MCNC: 4.4 MMOL/L — SIGNIFICANT CHANGE UP (ref 3.5–5.3)
POTASSIUM SERPL-MCNC: 4.6 MMOL/L — SIGNIFICANT CHANGE UP (ref 3.5–5.3)
POTASSIUM SERPL-SCNC: 4.4 MMOL/L — SIGNIFICANT CHANGE UP (ref 3.5–5.3)
POTASSIUM SERPL-SCNC: 4.6 MMOL/L — SIGNIFICANT CHANGE UP (ref 3.5–5.3)
PROT SERPL-MCNC: 5.9 G/DL — LOW (ref 6–8.3)
PROTHROM AB SERPL-ACNC: 16.2 SEC — HIGH (ref 10.6–13.6)
RBC # BLD: 3.68 M/UL — LOW (ref 3.8–5.2)
RBC # FLD: 17.4 % — HIGH (ref 10.3–14.5)
SODIUM SERPL-SCNC: 146 MMOL/L — HIGH (ref 135–145)
SODIUM SERPL-SCNC: 148 MMOL/L — HIGH (ref 135–145)
SPECIMEN SOURCE: SIGNIFICANT CHANGE UP
VALPROATE SERPL-MCNC: 30 UG/ML — LOW (ref 50–100)
WBC # BLD: 6.01 K/UL — SIGNIFICANT CHANGE UP (ref 3.8–10.5)
WBC # FLD AUTO: 6.01 K/UL — SIGNIFICANT CHANGE UP (ref 3.8–10.5)

## 2021-12-18 PROCEDURE — 95720 EEG PHY/QHP EA INCR W/VEEG: CPT

## 2021-12-18 PROCEDURE — 71045 X-RAY EXAM CHEST 1 VIEW: CPT | Mod: 26

## 2021-12-18 PROCEDURE — 99232 SBSQ HOSP IP/OBS MODERATE 35: CPT

## 2021-12-18 PROCEDURE — 99233 SBSQ HOSP IP/OBS HIGH 50: CPT | Mod: GC

## 2021-12-18 PROCEDURE — 43752 NASAL/OROGASTRIC W/TUBE PLMT: CPT

## 2021-12-18 RX ADMIN — Medication 25.93 MILLIGRAM(S): at 06:22

## 2021-12-18 RX ADMIN — Medication 25.93 MILLIGRAM(S): at 11:38

## 2021-12-18 RX ADMIN — CEFTRIAXONE 100 MILLIGRAM(S): 500 INJECTION, POWDER, FOR SOLUTION INTRAMUSCULAR; INTRAVENOUS at 01:25

## 2021-12-18 RX ADMIN — CEFTRIAXONE 100 MILLIGRAM(S): 500 INJECTION, POWDER, FOR SOLUTION INTRAMUSCULAR; INTRAVENOUS at 23:43

## 2021-12-18 RX ADMIN — CARBIDOPA AND LEVODOPA 1 TABLET(S): 25; 100 TABLET ORAL at 19:29

## 2021-12-18 RX ADMIN — Medication 25.93 MILLIGRAM(S): at 02:26

## 2021-12-18 RX ADMIN — Medication 25.93 MILLIGRAM(S): at 23:43

## 2021-12-18 RX ADMIN — Medication 25.93 MILLIGRAM(S): at 18:40

## 2021-12-18 RX ADMIN — CARBIDOPA AND LEVODOPA 1 TABLET(S): 25; 100 TABLET ORAL at 11:38

## 2021-12-18 RX ADMIN — CHLORHEXIDINE GLUCONATE 1 APPLICATION(S): 213 SOLUTION TOPICAL at 05:33

## 2021-12-18 NOTE — PROGRESS NOTE ADULT - ASSESSMENT
70F HTN, T2DM (a1c 6.0), asthma, hypothyroidism, seizure disorder, Parkinson's disease, hypothyroidism, schizophrenia transferred to the ED from [facility] for altered mental status. ICU was consulted for AMS and hypothermia.    NEURO  #AMS  Likely multifactorial - differential includes status epilepticus (as pt with known seizure history, however less likely as normal lactate), advanced dementia/PD, myxedema coms (TFTs just came back nml), polypharmacy, Urosepsis (+UA)  Patient more alert over the course of the day  - avoid sedatives, opiates, benzos  - vEEG report negative for seizures  - NGT placed  - psych recs: holding all antipsychotics at this time due to pts altered mental status     #Seizure disorder  - C/w valproid acid  - Valrpoic level 5pm 12/18, dose at 6PM  - vEEG report pending    #Hypothermia in the setting of antipsychotic polypharmacy or infection   Temp 85--> increasing to 94  - continue to rewarm w/ bear Joann issa   - Patient hypotensive when initially rewarming, requiring pressors     PULM  -currently protecting airway, 100% on RA  -f/u ABG    CARDIOVASCULAR  #Hypotension  - fluid bolus as needed  - Off levophed  - hold home antihypertensives  - Bradycardic at times: keep pacer pads on patient, atropine at bedside  - EKG showing J point elevation and cabrera waves, consistent w/ hypothermia     ID  #UTI (+UA)  - Unable to verbalize urinary symptoms  - S/p Ceftriaxone 1g in the ED  -c/w Ceftriaxone 1g q24h  - Urine cx +klebsiella  - blood cx NGTD x 1day    GI  - NGT placed 12/18    ENDO  #Hypothyroidism  -TFTs wnl  -continue synthroid  -monitor blood glucose  -ISS as needed    HEME  #normocytic anemia (baseline 10-11)  -H/H 10.9/35.8, MCV 87.7  -f/u iron studies    RENAL  # Hyperkalemia RESOLVED  K 5.8 in ED  s/p insulin and dextrose  - trend on bmp    F: None   E: Replete as necessary K>4 Mg>2  N: NPO  DVT Prophylaxis: SCDs  GI prophylaxis: None   CODE STATUS: FULL  DISPO: ICU until mental status improves

## 2021-12-18 NOTE — BH CONSULTATION LIAISON PROGRESS NOTE - NSBHASSESSMENTFT_PSY_ALL_CORE
70F HTN, T2DM (a1c 6.0), asthma, hypothyroidism, seizure disorder, Parkinson's disease, schizophrenia transferred to the ED from [facility] for altered mental status. pt was found last night minimally responsive, bradycardiac and cool to touch. A rapid response was called, and pt was brought to the ED. As per in-patient psychiatry unit, patient has multiple hospitalizations and presented with decompensated schizophrenia, with episodes of agitation, paranoia, grandiose delusions, irritability, with elevated mood and auditory and visual hallucinations. Patient was most recently on Olanzapine 5mg AM and 10mg PM, Depakote 500mg AM + 750mg QHS, Benztropine 0.25mg BID, and Seroquel 50mg Q6 PRN for agitation. On 12/15/21 patient was started on Clozapine 25mg QHS as she continued to be actively manic and psychotic. Pt previously on numerous antipsychotic medications, history of recurrent UTIs, and current positive UA being treated with ceftriaxone. Pt seen bedside in MICU. Pt was obtunded and lethargic, unable to follow one step commands. Unable to assess mood, thought process/content, perceptions, or cognition. Patient presentation most consistent with hypoactive delirium. Now off pressors, and with negative EEG, and receiving abx for a UTI. NGT placed due to lack of po intake 2/2 hypoactive delirium.     Delirium, acute, hypoactive  Schizoaffective d/o  Parkinson's disease with dementia    Plan:  -Continue to hold antipsychotic meds in setting of hypoactive delirium  -Continue to hold benzodiazepines given concern for prior adverse response with oversedation  -VPA use as per medical/neurology service as has been used as AED  -Zyprexa 2.5mg Po/IM Q6H PRN for agitation if pt is non-redirectable. Monitor for QTc prolongation.

## 2021-12-18 NOTE — PROGRESS NOTE ADULT - SUBJECTIVE AND OBJECTIVE BOX
***************************TRANSFER FROM MICU TO [facility]************************  HOSPITAL COURSE: 70F HTN, T2DM (a1c 6.0), asthma, hypothyroidism, seizure disorder, Parkinson's disease, hypothyroidism, schizophrenia transferred to the ED from [facility] for altered mental status. Pt. has had a prolonged hospital course, as she was initially transferred to [facility] from [facility] on  for lethargy and altered mental status. The patient underwent a comprehensive neuro/stroke workup with CTH, CTA head and neck and CT perfusion being grossly negative for any acute pathology. The patient was then transferred to [facility] on  for active psychosis and schizophrenia requiring physical and chemical restraints. While on [facility], the patient was placed on multiple different psychotropic medications. Per aide at bedside who has worked with patient since her admission to [facility], patient has not been her usual self today. Pt is normally active, agitated at baseline, however was noted to be lethargic and somnolent upon tr. Pt reportedly received a PRN Zyprexa and 2mg ativan the night before, and has been sleeping in bed all morning. She missed her meals and medications, so when the aide went to wake her up around 8pm, pt was minimally responsive, bradycardic and cool to touch. A rapid response was called, and pt was brought to the ED. Unable to obtain History/ROS as pt obtunded and responsive only to noxious stimuli. Patient admitted to MICU for further management. Patient started on levophed for hypotension and given ceftriaxone for positive UA. Patients mental status improved as more awake and alert however does not respond to questions appropriately, only responds yes to wanting to talk to her sister but no further improvement. Psych consulted, advised to hold off all psychotropic medications. Patient weaned off pressors at this time and VSS. NGT placed. EEG report negative for seizures.    OVERNIGHT EVENTS: MARK. vEEG on.    SUBJECTIVE:  Patient seen and examined at bedside. Not answering questions.     Vital Signs Last 12 Hrs  T(F): 99.7 (21 @ 05:00), Max: 100 (12-18-21 @ 00:57)  HR: 105 (21 @ 06:00) (94 - 117)  BP: 95/50 (21 @ 05:00) (93/52 - 136/72)  BP(mean): 64 (21 @ 05:00) (64 - 97)  RR: 12 (21 @ 06:00) (11 - 18)  SpO2: 99% (21 @ 06:00) (99% - 100%)  I&O's Summary    17 Dec 2021 07:01  -  18 Dec 2021 07:00  --------------------------------------------------------  IN: 633 mL / OUT: 1785 mL / NET: -1152 mL    PHYSICAL EXAM:  Constitutional: NAD, comfortable in bed.  HEENT: NC/AT, PERRLA, EOMI, no conjunctival pallor or scleral icterus, MMM  Neck: Supple, no JVD  Respiratory: CTA B/L. No w/r/r.   Cardiovascular: RRR, normal S1 and S2, no m/r/g.   Gastrointestinal: +BS, soft NTND, no guarding or rebound tenderness, no palpable masses   Extremities: wwp; no cyanosis, clubbing or edema.   Vascular: Pulses equal and strong throughout.   Neurological: Unable to assess, does not respond to questions (intermittent), does not follow commands.  Skin: No gross skin abnormalities or rashes    LABS:                        10.9   4.08  )-----------( 140      ( 16 Dec 2021 21:03 )             35.8     12-17    147<H>  |  119<H>  |  15  ----------------------------<  69<L>  5.0   |  19<L>  |  0.85    Ca    9.0      17 Dec 2021 15:10    TPro  7.0  /  Alb  3.2<L>  /  TBili  <0.2  /  DBili  x   /  AST  37  /  ALT  <5<L>  /  AlkPhos  87  12-16    PT/INR - ( 16 Dec 2021 21:21 )   PT: 14.5 sec;   INR: 1.22          PTT - ( 16 Dec 2021 21:21 )  PTT:55.8 sec  Urinalysis Basic - ( 16 Dec 2021 22:07 )    Color: Yellow / Appearance: Cloudy / S.025 / pH: x  Gluc: x / Ketone: Trace mg/dL  / Bili: Negative / Urobili: 0.2 E.U./dL   Blood: x / Protein: 100 mg/dL / Nitrite: POSITIVE   Leuk Esterase: Large / RBC: 5-10 /HPF / WBC Many /HPF   Sq Epi: x / Non Sq Epi: 0-5 /HPF / Bacteria: Many /HPF    RADIOLOGY & ADDITIONAL TESTS:    MEDICATIONS  (STANDING):  carbidopa/levodopa 25/100 Disintegrating Tablet 1 Tablet(s) Oral every 8 hours  cefTRIAXone   IVPB 2000 milliGRAM(s) IV Intermittent every 24 hours  chlorhexidine 2% Cloths 1 Application(s) Topical <User Schedule>  norepinephrine Infusion 0.05 MICROgram(s)/kG/Min (4.26 mL/Hr) IV Continuous <Continuous>  sodium chloride 0.9% Bolus 500 milliLiter(s) IV Bolus once  valproate sodium IVPB 185 milliGRAM(s) IV Intermittent every 6 hours    MEDICATIONS  (PRN):

## 2021-12-18 NOTE — PROGRESS NOTE ADULT - ASSESSMENT
70F HTN, T2DM (a1c 6.0), asthma, hypothyroidism, seizure disorder, Parkinson's disease, hypothyroidism, schizophrenia transferred to the ED from [facility] for altered mental status. ICU was consulted for AMS and hypothermia.    NEURO  #AMS  Likely multifactorial - differential includes status epilepticus (as pt with known seizure history, however less likely as normal lactate), advanced dementia/PD, myxedema coms (TFTs just came back nml), polypharmacy, Urosepsis (+UA)  Patient more alert over the course of the day  -avoid sedatives, opiates, benzos  -vEEG  -psych on board recs holding all antipsychotics at this time due to pts altered mental status     #Hypothermia in the setting of antipsychotic polypharmacy or infection   Temp 85--> increasing to 94  - continue to rewarm w/ bear Joann issa   - Patient hypotensive when initially rewarming, requiring pressors     PULM  -currently protecting airway, 100% on RA  -f/u ABG    CARDIOVASCULAR  -fluid bolus as needed  -Hypotensive at times: pt initially requiring levophed, wean as able  -hold home antihypertensives  -Bradycardic at times: keep pacer pads on patient, atropine at bedside  - EKG showing J point elevation and cabrera waves, consistent w/ hypothermia     ID  #UTI (+UA)  -Given Ceftriaxone 1g in the ED  -c/w Ceftriaxone 1g q24h  -f/u urine cx, blood cx    GI  -keep NPO  -bedside dysphagia screen when back to baseline    ENDO  -TFTs wnl  -continue synthroid  -monitor blood glucose  -ISS as needed    HEME  #normocytic anemia (baseline 10-11)  -H/H 10.9/35.8, MCV 87.7  -f/u iron studies    RENAL  # Hyperkalemia  K 5.8 in ED  s/p insulin and dextrose  - f/u repeat bmp    F: None   E: Replete as necessary K>4 Mg>2  N: NPO  DVT Prophylaxis: SCDs  GI prophylaxis: None   CODE STATUS: FULL  DISPO: ICU until mental status improves   70F HTN, T2DM (a1c 6.0), asthma, hypothyroidism, seizure disorder, Parkinson's disease, hypothyroidism, schizophrenia transferred to the ED from [facility] for altered mental status. ICU was consulted for AMS and hypothermia.    NEURO  #AMS  Likely multifactorial - differential includes status epilepticus (as pt with known seizure history, however less likely as normal lactate), advanced dementia/PD, myxedema coms (TFTs just came back nml), polypharmacy, Urosepsis (+UA)  Patient more alert over the course of the day  - avoid sedatives, opiates, benzos  - vEEG report pending  - psych recs: holding all antipsychotics at this time due to pts altered mental status     #Seizure disorder  - C/w valproid acid  - Valrpoic level 5pm 12/18, dose at 6PM  - vEEG report pending    #Hypothermia in the setting of antipsychotic polypharmacy or infection   Temp 85--> increasing to 94  - continue to rewarm w/ bear hugger, IVFs   - Patient hypotensive when initially rewarming, requiring pressors     PULM  -currently protecting airway, 100% on RA  -f/u ABG    CARDIOVASCULAR  #Hypotension  - fluid bolus as needed  - Off levophed  - hold home antihypertensives  - Bradycardic at times: keep pacer pads on patient, atropine at bedside  - EKG showing J point elevation and cabrera waves, consistent w/ hypothermia     ID  #UTI (+UA)  - Unable to verbalize urinary symptoms  - S/p Ceftriaxone 1g in the ED  -c/w Ceftriaxone 1g q24h  - Urine cx +klebsiella  - blood cx NGTD x 1day    GI  -keep NPO  -bedside dysphagia screen when back to baseline    ENDO  #Hypothyroidism  -TFTs wnl  -continue synthroid  -monitor blood glucose  -ISS as needed    HEME  #normocytic anemia (baseline 10-11)  -H/H 10.9/35.8, MCV 87.7  -f/u iron studies    RENAL  # Hyperkalemia RESOLVED  K 5.8 in ED  s/p insulin and dextrose  - trend on bmp    F: None   E: Replete as necessary K>4 Mg>2  N: NPO  DVT Prophylaxis: SCDs  GI prophylaxis: None   CODE STATUS: FULL  DISPO: ICU until mental status improves   70F HTN, T2DM (a1c 6.0), asthma, hypothyroidism, seizure disorder, Parkinson's disease, hypothyroidism, schizophrenia transferred to the ED from [facility] for altered mental status. ICU was consulted for AMS and hypothermia.    NEURO  #AMS  Likely multifactorial - differential includes status epilepticus (as pt with known seizure history, however less likely as normal lactate), advanced dementia/PD, myxedema coms (TFTs just came back nml), polypharmacy, Urosepsis (+UA)  Patient more alert over the course of the day  - avoid sedatives, opiates, benzos  - vEEG report negative for seizures  - NGT placed  - psych recs: holding all antipsychotics at this time due to pts altered mental status     #Seizure disorder  - C/w valproid acid  - Valrpoic level 5pm 12/18, dose at 6PM  - vEEG report pending    #Hypothermia in the setting of antipsychotic polypharmacy or infection   Temp 85--> increasing to 94  - continue to rewarm w/ bear Jonan issa   - Patient hypotensive when initially rewarming, requiring pressors     PULM  -currently protecting airway, 100% on RA  -f/u ABG    CARDIOVASCULAR  #Hypotension  - fluid bolus as needed  - Off levophed  - hold home antihypertensives  - Bradycardic at times: keep pacer pads on patient, atropine at bedside  - EKG showing J point elevation and cabrera waves, consistent w/ hypothermia     ID  #UTI (+UA)  - Unable to verbalize urinary symptoms  - S/p Ceftriaxone 1g in the ED  -c/w Ceftriaxone 1g q24h  - Urine cx +klebsiella  - blood cx NGTD x 1day    GI  - NGT placed 12/18    ENDO  #Hypothyroidism  -TFTs wnl  -continue synthroid  -monitor blood glucose  -ISS as needed    HEME  #normocytic anemia (baseline 10-11)  -H/H 10.9/35.8, MCV 87.7  -f/u iron studies    RENAL  # Hyperkalemia RESOLVED  K 5.8 in ED  s/p insulin and dextrose  - trend on bmp    F: None   E: Replete as necessary K>4 Mg>2  N: NPO  DVT Prophylaxis: SCDs  GI prophylaxis: None   CODE STATUS: FULL  DISPO: ICU until mental status improves

## 2021-12-18 NOTE — BH CONSULTATION LIAISON PROGRESS NOTE - NSBHFUPINTERVALHXFT_PSY_A_CORE
Case seen, discussed with nursing. Per RN, pt level of functioning is best described as mostly lethargic, nonverbal, not following commands, looking at the writer and tracking but not able to understand or meaningfully respond. No episode of agitation or behavioral PRNs.  She is now s/p feeding tube / NGT insertion    On evaluation, pt lethargic and not arousable to verbal or tactile stimuli.

## 2021-12-18 NOTE — EEG REPORT - NS EEG TEXT BOX
Day 1: 12/17/2021 @ 9:14:13 AM to next morning @ 07:00 am  Background:  , with predominantly delta and theta frequencies.  Symmetry:    Posterior Dominant Rhythm:  6-7 .  Organization: .  Voltage:    Variability: . 		Reactivity: .  N2 sleep: .  Spontaneous Activity:    Periodic/rhythmic activity:  .  Events:  No electrographic seizures or significant clinical events.  Provocations:  Hyperventilation and Photic stimulation: .    Daily Summary:        dysfunction.  No epileptiform activity and no significant clinical events occurred.    Rogelio Alaniz MD  Attending Neurologist, [facility]

## 2021-12-19 DIAGNOSIS — D69.6 THROMBOCYTOPENIA, UNSPECIFIED: ICD-10-CM

## 2021-12-19 DIAGNOSIS — Z29.9 ENCOUNTER FOR PROPHYLACTIC MEASURES, UNSPECIFIED: ICD-10-CM

## 2021-12-19 DIAGNOSIS — I10 ESSENTIAL (PRIMARY) HYPERTENSION: ICD-10-CM

## 2021-12-19 DIAGNOSIS — G40.909 EPILEPSY, UNSPECIFIED, NOT INTRACTABLE, WITHOUT STATUS EPILEPTICUS: ICD-10-CM

## 2021-12-19 DIAGNOSIS — J45.909 UNSPECIFIED ASTHMA, UNCOMPLICATED: ICD-10-CM

## 2021-12-19 DIAGNOSIS — F20.9 SCHIZOPHRENIA, UNSPECIFIED: ICD-10-CM

## 2021-12-19 DIAGNOSIS — G20 PARKINSON'S DISEASE: ICD-10-CM

## 2021-12-19 DIAGNOSIS — E03.9 HYPOTHYROIDISM, UNSPECIFIED: ICD-10-CM

## 2021-12-19 DIAGNOSIS — E11.9 TYPE 2 DIABETES MELLITUS WITHOUT COMPLICATIONS: ICD-10-CM

## 2021-12-19 DIAGNOSIS — R68.0 HYPOTHERMIA, NOT ASSOCIATED WITH LOW ENVIRONMENTAL TEMPERATURE: ICD-10-CM

## 2021-12-19 DIAGNOSIS — G92.8 OTHER TOXIC ENCEPHALOPATHY: ICD-10-CM

## 2021-12-19 LAB
ALBUMIN SERPL ELPH-MCNC: 3.7 G/DL — SIGNIFICANT CHANGE UP (ref 3.3–5)
ALP SERPL-CCNC: 119 U/L — SIGNIFICANT CHANGE UP (ref 40–120)
ALT FLD-CCNC: <5 U/L — LOW (ref 10–45)
ANION GAP SERPL CALC-SCNC: 12 MMOL/L — SIGNIFICANT CHANGE UP (ref 5–17)
AST SERPL-CCNC: 46 U/L — HIGH (ref 10–40)
BASOPHILS # BLD AUTO: 0.03 K/UL — SIGNIFICANT CHANGE UP (ref 0–0.2)
BASOPHILS NFR BLD AUTO: 0.5 % — SIGNIFICANT CHANGE UP (ref 0–2)
BILIRUB SERPL-MCNC: 0.2 MG/DL — SIGNIFICANT CHANGE UP (ref 0.2–1.2)
BUN SERPL-MCNC: 22 MG/DL — SIGNIFICANT CHANGE UP (ref 7–23)
CALCIUM SERPL-MCNC: 9.4 MG/DL — SIGNIFICANT CHANGE UP (ref 8.4–10.5)
CHLORIDE SERPL-SCNC: 112 MMOL/L — HIGH (ref 96–108)
CO2 SERPL-SCNC: 23 MMOL/L — SIGNIFICANT CHANGE UP (ref 22–31)
CREAT SERPL-MCNC: 0.8 MG/DL — SIGNIFICANT CHANGE UP (ref 0.5–1.3)
EOSINOPHIL # BLD AUTO: 0.03 K/UL — SIGNIFICANT CHANGE UP (ref 0–0.5)
EOSINOPHIL NFR BLD AUTO: 0.5 % — SIGNIFICANT CHANGE UP (ref 0–6)
GLUCOSE BLDC GLUCOMTR-MCNC: 107 MG/DL — HIGH (ref 70–99)
GLUCOSE BLDC GLUCOMTR-MCNC: 147 MG/DL — HIGH (ref 70–99)
GLUCOSE BLDC GLUCOMTR-MCNC: 68 MG/DL — LOW (ref 70–99)
GLUCOSE SERPL-MCNC: 65 MG/DL — LOW (ref 70–99)
HCT VFR BLD CALC: 35.7 % — SIGNIFICANT CHANGE UP (ref 34.5–45)
HGB BLD-MCNC: 11.4 G/DL — LOW (ref 11.5–15.5)
IMM GRANULOCYTES NFR BLD AUTO: 1.3 % — SIGNIFICANT CHANGE UP (ref 0–1.5)
LYMPHOCYTES # BLD AUTO: 1 K/UL — SIGNIFICANT CHANGE UP (ref 1–3.3)
LYMPHOCYTES # BLD AUTO: 15.6 % — SIGNIFICANT CHANGE UP (ref 13–44)
MAGNESIUM SERPL-MCNC: 2 MG/DL — SIGNIFICANT CHANGE UP (ref 1.6–2.6)
MCHC RBC-ENTMCNC: 28 PG — SIGNIFICANT CHANGE UP (ref 27–34)
MCHC RBC-ENTMCNC: 31.9 GM/DL — LOW (ref 32–36)
MCV RBC AUTO: 87.7 FL — SIGNIFICANT CHANGE UP (ref 80–100)
MONOCYTES # BLD AUTO: 0.79 K/UL — SIGNIFICANT CHANGE UP (ref 0–0.9)
MONOCYTES NFR BLD AUTO: 12.3 % — SIGNIFICANT CHANGE UP (ref 2–14)
NEUTROPHILS # BLD AUTO: 4.47 K/UL — SIGNIFICANT CHANGE UP (ref 1.8–7.4)
NEUTROPHILS NFR BLD AUTO: 69.8 % — SIGNIFICANT CHANGE UP (ref 43–77)
NRBC # BLD: 0 /100 WBCS — SIGNIFICANT CHANGE UP (ref 0–0)
PHOSPHATE SERPL-MCNC: 3.8 MG/DL — SIGNIFICANT CHANGE UP (ref 2.5–4.5)
PLATELET # BLD AUTO: 124 K/UL — LOW (ref 150–400)
POTASSIUM SERPL-MCNC: 4.4 MMOL/L — SIGNIFICANT CHANGE UP (ref 3.5–5.3)
POTASSIUM SERPL-SCNC: 4.4 MMOL/L — SIGNIFICANT CHANGE UP (ref 3.5–5.3)
PROT SERPL-MCNC: 7.8 G/DL — SIGNIFICANT CHANGE UP (ref 6–8.3)
RBC # BLD: 4.07 M/UL — SIGNIFICANT CHANGE UP (ref 3.8–5.2)
RBC # FLD: 16.7 % — HIGH (ref 10.3–14.5)
SODIUM SERPL-SCNC: 147 MMOL/L — HIGH (ref 135–145)
WBC # BLD: 6.4 K/UL — SIGNIFICANT CHANGE UP (ref 3.8–10.5)
WBC # FLD AUTO: 6.4 K/UL — SIGNIFICANT CHANGE UP (ref 3.8–10.5)

## 2021-12-19 PROCEDURE — 99233 SBSQ HOSP IP/OBS HIGH 50: CPT | Mod: GC

## 2021-12-19 PROCEDURE — 99231 SBSQ HOSP IP/OBS SF/LOW 25: CPT

## 2021-12-19 PROCEDURE — 99232 SBSQ HOSP IP/OBS MODERATE 35: CPT | Mod: GC

## 2021-12-19 PROCEDURE — 95720 EEG PHY/QHP EA INCR W/VEEG: CPT

## 2021-12-19 RX ORDER — HYDRALAZINE HCL 50 MG
25 TABLET ORAL ONCE
Refills: 0 | Status: COMPLETED | OUTPATIENT
Start: 2021-12-19 | End: 2021-12-19

## 2021-12-19 RX ORDER — METOPROLOL TARTRATE 50 MG
25 TABLET ORAL EVERY 12 HOURS
Refills: 0 | Status: DISCONTINUED | OUTPATIENT
Start: 2021-12-19 | End: 2021-12-19

## 2021-12-19 RX ORDER — ENOXAPARIN SODIUM 100 MG/ML
30 INJECTION SUBCUTANEOUS EVERY 24 HOURS
Refills: 0 | Status: DISCONTINUED | OUTPATIENT
Start: 2021-12-19 | End: 2021-12-23

## 2021-12-19 RX ORDER — CARBIDOPA AND LEVODOPA 25; 100 MG/1; MG/1
1 TABLET ORAL EVERY 8 HOURS
Refills: 0 | Status: DISCONTINUED | OUTPATIENT
Start: 2021-12-19 | End: 2021-12-23

## 2021-12-19 RX ORDER — INSULIN LISPRO 100/ML
VIAL (ML) SUBCUTANEOUS
Refills: 0 | Status: DISCONTINUED | OUTPATIENT
Start: 2021-12-19 | End: 2021-12-19

## 2021-12-19 RX ORDER — LEVOTHYROXINE SODIUM 125 MCG
75 TABLET ORAL EVERY 24 HOURS
Refills: 0 | Status: DISCONTINUED | OUTPATIENT
Start: 2021-12-20 | End: 2021-12-21

## 2021-12-19 RX ORDER — INSULIN LISPRO 100/ML
VIAL (ML) SUBCUTANEOUS EVERY 6 HOURS
Refills: 0 | Status: DISCONTINUED | OUTPATIENT
Start: 2021-12-19 | End: 2021-12-23

## 2021-12-19 RX ORDER — OLANZAPINE 15 MG/1
2.5 TABLET, FILM COATED ORAL EVERY 8 HOURS
Refills: 0 | Status: DISCONTINUED | OUTPATIENT
Start: 2021-12-19 | End: 2021-12-23

## 2021-12-19 RX ORDER — METOPROLOL TARTRATE 50 MG
25 TABLET ORAL EVERY 12 HOURS
Refills: 0 | Status: DISCONTINUED | OUTPATIENT
Start: 2021-12-19 | End: 2021-12-23

## 2021-12-19 RX ORDER — DEXTROSE 50 % IN WATER 50 %
25 SYRINGE (ML) INTRAVENOUS ONCE
Refills: 0 | Status: COMPLETED | OUTPATIENT
Start: 2021-12-19 | End: 2021-12-19

## 2021-12-19 RX ORDER — LISINOPRIL 2.5 MG/1
10 TABLET ORAL EVERY 24 HOURS
Refills: 0 | Status: DISCONTINUED | OUTPATIENT
Start: 2021-12-20 | End: 2021-12-23

## 2021-12-19 RX ORDER — VALACYCLOVIR 500 MG/1
1000 TABLET, FILM COATED ORAL EVERY 12 HOURS
Refills: 0 | Status: DISCONTINUED | OUTPATIENT
Start: 2021-12-19 | End: 2021-12-23

## 2021-12-19 RX ADMIN — CEFTRIAXONE 100 MILLIGRAM(S): 500 INJECTION, POWDER, FOR SOLUTION INTRAMUSCULAR; INTRAVENOUS at 22:43

## 2021-12-19 RX ADMIN — Medication 25.93 MILLIGRAM(S): at 05:07

## 2021-12-19 RX ADMIN — Medication 25 MILLILITER(S): at 19:14

## 2021-12-19 RX ADMIN — Medication 25.93 MILLIGRAM(S): at 11:44

## 2021-12-19 RX ADMIN — VALACYCLOVIR 1000 MILLIGRAM(S): 500 TABLET, FILM COATED ORAL at 19:57

## 2021-12-19 RX ADMIN — Medication 25 MILLIGRAM(S): at 19:14

## 2021-12-19 RX ADMIN — Medication 25 MILLIGRAM(S): at 10:37

## 2021-12-19 RX ADMIN — Medication 25 MILLIGRAM(S): at 15:39

## 2021-12-19 RX ADMIN — CARBIDOPA AND LEVODOPA 1 TABLET(S): 25; 100 TABLET ORAL at 11:44

## 2021-12-19 RX ADMIN — CARBIDOPA AND LEVODOPA 1 TABLET(S): 25; 100 TABLET ORAL at 22:42

## 2021-12-19 RX ADMIN — CARBIDOPA AND LEVODOPA 1 TABLET(S): 25; 100 TABLET ORAL at 05:05

## 2021-12-19 RX ADMIN — Medication 26.25 MILLIGRAM(S): at 22:44

## 2021-12-19 RX ADMIN — ENOXAPARIN SODIUM 30 MILLIGRAM(S): 100 INJECTION SUBCUTANEOUS at 22:42

## 2021-12-19 NOTE — PROGRESS NOTE ADULT - PROBLEM SELECTOR PLAN 11
F: maintenance fluids  E: replete PRN  N: NPO with tube feeds  DVT ppx: lovenox?  GI PPx: None    FULL CODE    Dispo: [facility] F: maintenance fluids  E: replete PRN  N: NPO with tube feeds  DVT ppx: lovenox  GI PPx: None    FULL CODE    Dispo: SH[facility]

## 2021-12-19 NOTE — BH CONSULTATION LIAISON PROGRESS NOTE - NSBHFUPINTERVALHXFT_PSY_A_CORE
Pt seen and I spoke with RN and also reviewed the chart  he has been more alert but still remains disorganized  not agitated but at times laci to get out of bed   not oriented to TPP  Pt seen and I spoke with RN and also reviewed the chart  he has been more alert but still remains partially disoriented,   no overt agitation, has been awake  is able to follow and partially aware that is in hospital   still is on EEG monitoring

## 2021-12-19 NOTE — PROGRESS NOTE ADULT - ASSESSMENT
70F HTN, T2DM (a1c 6.0), asthma, hypothyroidism, seizure disorder, Parkinson's disease, hypothyroidism, schizophrenia transferred to the ED from [facility] for altered mental status. ICU was consulted for AMS and hypothermia.    NEURO  #AMS  Likely multifactorial - Most likely 2/2 polypharmacy. Other differential includes status epilepticus (as pt with known seizure history, however less likely as normal lactate), advanced dementia/PD, myxedema coms (TFTs nml), Urosepsis (+UA)  patient with imrpoved mental status today  - avoid sedatives, opiates, benzos  - vEEG report negative for seizures  - NGT placed  - psych recs: holding all antipsychotics at this time due to pts altered mental status - can give zyprexa 2.5mg Q8hrs for agitation.    #Seizure disorder  -Hold valproic acid  - VEEG negative for seizure     #Parkinson disease  - c/w carbidopa/levodopa Q8hrs via NGT    #Hypothermia in the setting of antipsychotic polypharmacy or infection - RESOLVED   Temp 85--> increasing to 94  - continue to rewarm w/ bear Joann issa   - Patient hypotensive when initially rewarming, requiring pressors     PULM  -currently protecting airway, 100% on RA  -f/u ABG    CARDIOVASCULAR  #Hypotension - RESOLVED  - fluid bolus as needed  - Off levophed  - hold home antihypertensives  - Bradycardic at times: keep pacer pads on patient, atropine at bedside  - EKG showing J point elevation and cabrera waves, consistent w/ hypothermia     #HTN  - Restarted home lopressor 25mg Q12 via NGT hold for SBP< 100 and Hr < 60    ID  #UTI (+UA)  - Unable to verbalize urinary symptoms  - S/p Ceftriaxone 1g in the ED  -c/w Ceftriaxone 1g q24h  - Urine cx +klebsiella  - blood cx NGTD x 2day    GI  - NGT placed 12/18    ENDO  #Hypothyroidism  -TFTs wnl  -continue synthroid  -monitor blood glucose  -ISS as needed    HEME  #normocytic anemia (baseline 10-11)  -H/H stable  -f/u iron studies    RENAL  # Hyperkalemia RESOLVED  K 5.8 in ED  s/p insulin and dextrose  - trend on bmp    F: None   E: Replete as necessary K>4 Mg>2  N: glucerna 1.2  DVT Prophylaxis: lovenox 30mg Q12  GI prophylaxis: None   CODE STATUS: FULL  DISPO:RMF

## 2021-12-19 NOTE — BH CONSULTATION LIAISON PROGRESS NOTE - NSBHASSESSMENTFT_PSY_ALL_CORE
71 y/o man with dementia, has been in hospital for delirium   has been alert today but remains still disoriented, could be also the dementia partly causing disorientation   in the process of finding placement   as he was sedated olanzapine was reduced and he is doing fine  69 y/o woman with schizophrenia and hx of tx with clozapine and [facility]   was admitted to psych for relapse of psychosis and had a declining mental state and alertness   following a rapid call due to AMS transferred to medicine   is evaluated for AMS, possible delirium in addition to psychosis   slowly recovering after holding off all psych meds and addressing delirium

## 2021-12-19 NOTE — PROGRESS NOTE ADULT - PROBLEM SELECTOR PLAN 1
likely 2/2 UTI vs polypharmacy  - f/u psych recs  - c/w CTX  - likely 2/2 UTI vs polypharmacy  - f/u psych recs  - c/w CTX

## 2021-12-19 NOTE — PROGRESS NOTE ADULT - SUBJECTIVE AND OBJECTIVE BOX
HOSPITAL COURSE: 70F HTN, T2DM (a1c 6.0), asthma, hypothyroidism, seizure disorder, Parkinson's disease, hypothyroidism, schizophrenia transferred to the ED from [facility] for altered mental status. Pt. has had a prolonged hospital course, as she was initially transferred to Health system from [facility] on 11/21 for lethargy and altered mental status. The patient underwent a comprehensive neuro/stroke workup with CTH, CTA head and neck and CT perfusion being grossly negative for any acute pathology. The patient was then transferred to [facility] on 12/7 for active psychosis and schizophrenia requiring physical and chemical restraints. While on [facility], the patient was placed on multiple different psychotropic medications. Per aide at bedside who has worked with patient since her admission to [facility], patient has not been her usual self today. Pt is normally active, agitated at baseline, however was noted to be lethargic and somnolent upon tr. Pt reportedly received a PRN Zyprexa and 2mg ativan the night before, and has been sleeping in bed all morning. She missed her meals and medications, so when the aide went to wake her up around 8pm, pt was minimally responsive, bradycardic and cool to touch. A rapid response was called, and pt was brought to the ED. Unable to obtain History/ROS as pt obtunded and responsive only to noxious stimuli. Patient admitted to MICU for further management. Patient started on levophed for hypotension and given ceftriaxone for positive UA. Patients mental status improved as more awake and alert however does not respond to questions appropriately, only responds yes to wanting to talk to her sister but no further improvement. Psych consulted, advised to hold off all psychotropic medications. Patient weaned off pressors at this time and VSS. NGT placed. EEG report negative for seizures.     SUBJECTIVE / INTERVAL HPI: Patient seen and examined at bedside.      PHYSICAL EXAM:  Constitutional: NAD, comfortable in bed.  HEENT: NC/AT, PERRLA, EOMI, no conjunctival pallor or scleral icterus, MMM  Neck: Supple, no JVD  Respiratory: CTA B/L. No w/r/r.   Cardiovascular: tachycardic, regular rhythm, normal S1 and S2, no m/r/g.   Gastrointestinal: +BS, soft NTND, no guarding or rebound tenderness, no palpable masses   : + olvera   Extremities: wwp; no cyanosis, clubbing or edema.   Vascular: Pulses equal and strong throughout.   Neurological: AO to self and hospital, able to follow commands.  Skin: No gross skin abnormalities or rashes    VITAL SIGNS:  Vital Signs Last 24 Hrs  T(C): 36.4 (19 Dec 2021 15:00), Max: 37 (18 Dec 2021 18:35)  T(F): 97.5 (19 Dec 2021 15:00), Max: 98.6 (18 Dec 2021 18:35)  HR: 86 (19 Dec 2021 15:00) (78 - 103)  BP: 180/103 (19 Dec 2021 15:00) (108/55 - 180/103)  BP(mean): 117 (19 Dec 2021 11:43) (74 - 117)  RR: 18 (19 Dec 2021 15:00) (11 - 19)  SpO2: 99% (19 Dec 2021 15:00) (98% - 100%)      MEDICATIONS:  MEDICATIONS  (STANDING):  carbidopa/levodopa 25/100 Disintegrating Tablet 1 Tablet(s) Oral every 8 hours  cefTRIAXone   IVPB 2000 milliGRAM(s) IV Intermittent every 24 hours  enoxaparin Injectable 30 milliGRAM(s) SubCutaneous every 24 hours  insulin lispro (ADMELOG) corrective regimen sliding scale   SubCutaneous every 6 hours  metoprolol tartrate 25 milliGRAM(s) Oral every 12 hours  valproate sodium IVPB 250 milliGRAM(s) IV Intermittent every 8 hours    MEDICATIONS  (PRN):  OLANZapine Injectable 2.5 milliGRAM(s) IntraMuscular every 8 hours PRN agitation      ALLERGIES:  Allergies    penicillin (Hives; Rash)    Intolerances        LABS:                        11.4   6.40  )-----------( 124      ( 19 Dec 2021 15:08 )             35.7     12-19    147<H>  |  112<H>  |  22  ----------------------------<  65<L>  4.4   |  23  |  0.80    Ca    9.4      19 Dec 2021 15:08  Phos  3.8     12-19  Mg     2.0     12-19    TPro  7.8  /  Alb  3.7  /  TBili  0.2  /  DBili  x   /  AST  46<H>  /  ALT  <5<L>  /  AlkPhos  119  12-19    PT/INR - ( 18 Dec 2021 11:49 )   PT: 16.2 sec;   INR: 1.37          PTT - ( 18 Dec 2021 11:49 )  PTT:51.0 sec    CAPILLARY BLOOD GLUCOSE      POCT Blood Glucose.: 180 mg/dL (17 Dec 2021 17:42)      RADIOLOGY & ADDITIONAL TESTS: Reviewed.

## 2021-12-19 NOTE — PROGRESS NOTE ADULT - SUBJECTIVE AND OBJECTIVE BOX
TRANSFER ACCEPTANCE NOTE    [facility] to [facility]    Hospital course:    70F HTN, T2DM (a1c 6.0), asthma, hypothyroidism, seizure disorder, Parkinson's disease, hypothyroidism, schizophrenia transferred to the ED from [facility] for altered mental status. Pt has had a prolonged hospital course, as she was initially transferred to [facility] from [facility] on 11/21 for lethargy and altered mental status. The patient underwent a comprehensive neuro/stroke workup with CTH, CTA head and neck and CT perfusion being grossly negative for any acute pathology. The patient was then transferred to [facility] on 12/7 for active psychosis and schizophrenia requiring physical and chemical restraints. While on [facility], the patient was placed on multiple different psychotropic medications. Per aide at bedside who has worked with patient since her admission to [facility], patient has not been her usual self. Pt is normally active, agitated at baseline, however was noted to be lethargic and somnolent upon tr. Pt reportedly received a PRN Zyprexa and 2mg ativan the night before, and has been sleeping in bed all morning. She missed her meals and medications, so when the aide went to wake her up around 8pm, pt was minimally responsive, bradycardic and cool to touch. A rapid response was called, and pt was brought to the ED. Unable to obtain History/ROS as pt obtunded and responsive only to noxious stimuli. Patient admitted to MICU for further management. Patient started on levophed for hypotension and given ceftriaxone for positive UA. Patients mental status improved as more awake and alert however does not respond to questions appropriately, only responds yes to wanting to talk to her sister but no further improvement. Psych consulted, advised to hold off all psychotropic medication except Zyprexa PRN for agitation. Patient weaned off pressors at this time and VSS. NGT placed as pt was unable to swallow due to AMS. EEG report negative for seizures. Pt deemed stable for step down from MICU to telemetry unit. Pt afebrile and HR WNL. Mental status improving. Stable for stepdown to [facility].    OVERNIGHT EVENTS: MARK    SUBJECTIVE / INTERVAL HPI: Patient seen and examined at bedside. Pt is calm and cooperative. Does not have complaints at this time. She has not had a bm yet.     VITAL SIGNS:  Vital Signs Last 24 Hrs  T(C): 35.3 (19 Dec 2021 09:05), Max: 37 (18 Dec 2021 18:35)  T(F): 95.5 (19 Dec 2021 09:05), Max: 98.6 (18 Dec 2021 18:35)  HR: 78 (19 Dec 2021 11:43) (78 - 103)  BP: 157/89 (19 Dec 2021 11:43) (100/60 - 165/81)  BP(mean): 117 (19 Dec 2021 11:43) (74 - 117)  RR: 18 (19 Dec 2021 11:43) (10 - 19)  SpO2: 100% (19 Dec 2021 11:43) (98% - 100%)    PHYSICAL EXAM:    General: elderly; NAD  HEENT: NC/AT; PERRL, anicteric sclera; MMM  Neck: supple  Cardiovascular: +S1/S2; RRR  Respiratory: CTA B/L; no W/R/R  Gastrointestinal: soft, NT/ND; +BSx4  Extremities: WWP; no edema, clubbing or cyanosis  Vascular: 2+ radial, DP/PT pulses B/L  Neurological: AAOx1-2; no focal deficits; follows commands  Psych: blunted affect    MEDICATIONS:  MEDICATIONS  (STANDING):  carbidopa/levodopa 25/100 Disintegrating Tablet 1 Tablet(s) Oral every 8 hours  cefTRIAXone   IVPB 2000 milliGRAM(s) IV Intermittent every 24 hours  metoprolol tartrate 25 milliGRAM(s) Oral every 12 hours  valproate sodium IVPB 250 milliGRAM(s) IV Intermittent every 8 hours    MEDICATIONS  (PRN):      ALLERGIES:  Allergies    penicillin (Hives; Rash)    Intolerances        LABS:                        10.4   6.01  )-----------( 125      ( 18 Dec 2021 11:49 )             32.9     12-18    146<H>  |  116<H>  |  17  ----------------------------<  70  4.4   |  23  |  1.02    Ca    9.3      18 Dec 2021 11:49  Phos  3.4     12-18  Mg     2.0     12-18    TPro  5.9<L>  /  Alb  2.7<L>  /  TBili  <0.2  /  DBili  x   /  AST  36  /  ALT  29  /  AlkPhos  77  12-18    PT/INR - ( 18 Dec 2021 11:49 )   PT: 16.2 sec;   INR: 1.37          PTT - ( 18 Dec 2021 11:49 )  PTT:51.0 sec    CAPILLARY BLOOD GLUCOSE      POCT Blood Glucose.: 180 mg/dL (17 Dec 2021 17:42)      RADIOLOGY & ADDITIONAL TESTS: Reviewed.    ASSESSMENT:    PLAN:

## 2021-12-19 NOTE — EEG REPORT - NS EEG TEXT BOX
Day 2: 12/18/2021 @ 07:00 AM to next morning @ 07:00 am  Background:  , with predominantly delta and theta frequencies.  Symmetry:    Posterior Dominant Rhythm:  6-7 .  Organization: .  Voltage:    Variability: . 		Reactivity: .  N2 sleep: .  Spontaneous Activity:    Periodic/rhythmic activity:  .  Events:  No electrographic seizures or significant clinical events.  Provocations:  Hyperventilation and Photic stimulation: .    Daily Summary:        dysfunction.  No epileptiform activity and no significant clinical events occurred.    Rogelio Alaniz MD  Attending Neurologist, [facility] Epilepsy [facility]

## 2021-12-19 NOTE — PROGRESS NOTE ADULT - ASSESSMENT
70F HTN, T2DM (a1c 6.0), asthma, hypothyroidism, seizure disorder, Parkinson's disease, hypothyroidism, schizophrenia transferred to the ED from [facility] for altered mental status. Initially admitted to ICU for AMS, hypothermia, and hypotension requiring levophed likely 2/2 UTI vs polypharmacy. s/p pressors with NGT placed for nutritional support and stepped down to [facility], now stable for RMF.

## 2021-12-19 NOTE — BH CONSULTATION LIAISON PROGRESS NOTE - MSE UNSTRUCTURED FT
elderly man   alert   disoriented to TPP,   follows commands  mood stable and affect is stable too  no overt hallucination today   insight limited   judgment limited   no si/hi  older woman, cooperative with EEG monitoring   alert   disoriented to TPP,   follows commands  mood stable and affect is stable too,   babbling to self, unclear if any hallucination but not overtly responding to any unseen stimuli  insight limited   judgment limited   no si/hi noted

## 2021-12-20 ENCOUNTER — TRANSCRIPTION ENCOUNTER (OUTPATIENT)
Age: 71
End: 2021-12-20

## 2021-12-20 LAB
ANION GAP SERPL CALC-SCNC: 11 MMOL/L — SIGNIFICANT CHANGE UP (ref 5–17)
BASOPHILS # BLD AUTO: 0.02 K/UL — SIGNIFICANT CHANGE UP (ref 0–0.2)
BASOPHILS NFR BLD AUTO: 0.3 % — SIGNIFICANT CHANGE UP (ref 0–2)
BUN SERPL-MCNC: 22 MG/DL — SIGNIFICANT CHANGE UP (ref 7–23)
CALCIUM SERPL-MCNC: 10.1 MG/DL — SIGNIFICANT CHANGE UP (ref 8.4–10.5)
CHLORIDE SERPL-SCNC: 110 MMOL/L — HIGH (ref 96–108)
CO2 SERPL-SCNC: 24 MMOL/L — SIGNIFICANT CHANGE UP (ref 22–31)
CREAT SERPL-MCNC: 0.71 MG/DL — SIGNIFICANT CHANGE UP (ref 0.5–1.3)
EOSINOPHIL # BLD AUTO: 0.03 K/UL — SIGNIFICANT CHANGE UP (ref 0–0.5)
EOSINOPHIL NFR BLD AUTO: 0.5 % — SIGNIFICANT CHANGE UP (ref 0–6)
GLUCOSE BLDC GLUCOMTR-MCNC: 109 MG/DL — HIGH (ref 70–99)
GLUCOSE BLDC GLUCOMTR-MCNC: 119 MG/DL — HIGH (ref 70–99)
GLUCOSE BLDC GLUCOMTR-MCNC: 128 MG/DL — HIGH (ref 70–99)
GLUCOSE BLDC GLUCOMTR-MCNC: 158 MG/DL — HIGH (ref 70–99)
GLUCOSE SERPL-MCNC: 144 MG/DL — HIGH (ref 70–99)
HCT VFR BLD CALC: 39 % — SIGNIFICANT CHANGE UP (ref 34.5–45)
HGB BLD-MCNC: 11.9 G/DL — SIGNIFICANT CHANGE UP (ref 11.5–15.5)
IMM GRANULOCYTES NFR BLD AUTO: 0.8 % — SIGNIFICANT CHANGE UP (ref 0–1.5)
LYMPHOCYTES # BLD AUTO: 0.94 K/UL — LOW (ref 1–3.3)
LYMPHOCYTES # BLD AUTO: 15.1 % — SIGNIFICANT CHANGE UP (ref 13–44)
MAGNESIUM SERPL-MCNC: 2.1 MG/DL — SIGNIFICANT CHANGE UP (ref 1.6–2.6)
MCHC RBC-ENTMCNC: 26.9 PG — LOW (ref 27–34)
MCHC RBC-ENTMCNC: 30.5 GM/DL — LOW (ref 32–36)
MCV RBC AUTO: 88 FL — SIGNIFICANT CHANGE UP (ref 80–100)
MONOCYTES # BLD AUTO: 0.69 K/UL — SIGNIFICANT CHANGE UP (ref 0–0.9)
MONOCYTES NFR BLD AUTO: 11.1 % — SIGNIFICANT CHANGE UP (ref 2–14)
NEUTROPHILS # BLD AUTO: 4.48 K/UL — SIGNIFICANT CHANGE UP (ref 1.8–7.4)
NEUTROPHILS NFR BLD AUTO: 72.2 % — SIGNIFICANT CHANGE UP (ref 43–77)
NRBC # BLD: 0 /100 WBCS — SIGNIFICANT CHANGE UP (ref 0–0)
PHOSPHATE SERPL-MCNC: 4.7 MG/DL — HIGH (ref 2.5–4.5)
PLATELET # BLD AUTO: 138 K/UL — LOW (ref 150–400)
POTASSIUM SERPL-MCNC: 4.5 MMOL/L — SIGNIFICANT CHANGE UP (ref 3.5–5.3)
POTASSIUM SERPL-SCNC: 4.5 MMOL/L — SIGNIFICANT CHANGE UP (ref 3.5–5.3)
RBC # BLD: 4.43 M/UL — SIGNIFICANT CHANGE UP (ref 3.8–5.2)
RBC # FLD: 16.6 % — HIGH (ref 10.3–14.5)
SODIUM SERPL-SCNC: 145 MMOL/L — SIGNIFICANT CHANGE UP (ref 135–145)
WBC # BLD: 6.21 K/UL — SIGNIFICANT CHANGE UP (ref 3.8–10.5)
WBC # FLD AUTO: 6.21 K/UL — SIGNIFICANT CHANGE UP (ref 3.8–10.5)

## 2021-12-20 PROCEDURE — 95720 EEG PHY/QHP EA INCR W/VEEG: CPT

## 2021-12-20 PROCEDURE — 70450 CT HEAD/BRAIN W/O DYE: CPT | Mod: 26

## 2021-12-20 PROCEDURE — 99232 SBSQ HOSP IP/OBS MODERATE 35: CPT

## 2021-12-20 PROCEDURE — 51702 INSERT TEMP BLADDER CATH: CPT

## 2021-12-20 PROCEDURE — 99232 SBSQ HOSP IP/OBS MODERATE 35: CPT | Mod: GC

## 2021-12-20 RX ORDER — ENOXAPARIN SODIUM 100 MG/ML
0 INJECTION SUBCUTANEOUS
Qty: 0 | Refills: 0 | DISCHARGE
Start: 2021-12-20

## 2021-12-20 RX ORDER — VALPROIC ACID (AS SODIUM SALT) 250 MG/5ML
2.5 SOLUTION, ORAL ORAL
Qty: 0 | Refills: 0 | DISCHARGE
Start: 2021-12-20

## 2021-12-20 RX ORDER — POLYETHYLENE GLYCOL 3350 17 G/17G
17 POWDER, FOR SOLUTION ORAL EVERY 12 HOURS
Refills: 0 | Status: DISCONTINUED | OUTPATIENT
Start: 2021-12-20 | End: 2021-12-22

## 2021-12-20 RX ORDER — ONDANSETRON 8 MG/1
4 TABLET, FILM COATED ORAL ONCE
Refills: 0 | Status: COMPLETED | OUTPATIENT
Start: 2021-12-20 | End: 2021-12-20

## 2021-12-20 RX ORDER — POLYETHYLENE GLYCOL 3350 17 G/17G
17 POWDER, FOR SOLUTION ORAL EVERY 24 HOURS
Refills: 0 | Status: DISCONTINUED | OUTPATIENT
Start: 2021-12-20 | End: 2021-12-20

## 2021-12-20 RX ORDER — OLANZAPINE 15 MG/1
2.5 TABLET, FILM COATED ORAL
Qty: 0 | Refills: 0 | DISCHARGE
Start: 2021-12-20

## 2021-12-20 RX ORDER — INSULIN LISPRO 100/ML
0 VIAL (ML) SUBCUTANEOUS
Qty: 0 | Refills: 0 | DISCHARGE
Start: 2021-12-20

## 2021-12-20 RX ORDER — POLYETHYLENE GLYCOL 3350 17 G/17G
17 POWDER, FOR SOLUTION ORAL EVERY 12 HOURS
Refills: 0 | Status: DISCONTINUED | OUTPATIENT
Start: 2021-12-20 | End: 2021-12-20

## 2021-12-20 RX ORDER — VALPROIC ACID (AS SODIUM SALT) 250 MG/5ML
1 SOLUTION, ORAL ORAL
Qty: 90 | Refills: 0
Start: 2021-12-20 | End: 2022-01-18

## 2021-12-20 RX ORDER — SENNA PLUS 8.6 MG/1
2 TABLET ORAL AT BEDTIME
Refills: 0 | Status: DISCONTINUED | OUTPATIENT
Start: 2021-12-20 | End: 2021-12-23

## 2021-12-20 RX ORDER — CEFTRIAXONE 500 MG/1
1000 INJECTION, POWDER, FOR SOLUTION INTRAMUSCULAR; INTRAVENOUS EVERY 24 HOURS
Refills: 0 | Status: DISCONTINUED | OUTPATIENT
Start: 2021-12-20 | End: 2021-12-21

## 2021-12-20 RX ORDER — VALACYCLOVIR 500 MG/1
1 TABLET, FILM COATED ORAL
Qty: 0 | Refills: 0 | DISCHARGE
Start: 2021-12-20 | End: 2021-12-26

## 2021-12-20 RX ADMIN — POLYETHYLENE GLYCOL 3350 17 GRAM(S): 17 POWDER, FOR SOLUTION ORAL at 20:05

## 2021-12-20 RX ADMIN — Medication 26.25 MILLIGRAM(S): at 05:52

## 2021-12-20 RX ADMIN — ONDANSETRON 4 MILLIGRAM(S): 8 TABLET, FILM COATED ORAL at 22:49

## 2021-12-20 RX ADMIN — Medication 25 MILLIGRAM(S): at 20:04

## 2021-12-20 RX ADMIN — LISINOPRIL 10 MILLIGRAM(S): 2.5 TABLET ORAL at 05:53

## 2021-12-20 RX ADMIN — Medication 25 MILLIGRAM(S): at 07:23

## 2021-12-20 RX ADMIN — Medication 26.25 MILLIGRAM(S): at 22:50

## 2021-12-20 RX ADMIN — CARBIDOPA AND LEVODOPA 1 TABLET(S): 25; 100 TABLET ORAL at 05:52

## 2021-12-20 RX ADMIN — Medication 2: at 12:55

## 2021-12-20 RX ADMIN — ENOXAPARIN SODIUM 30 MILLIGRAM(S): 100 INJECTION SUBCUTANEOUS at 22:50

## 2021-12-20 RX ADMIN — CEFTRIAXONE 100 MILLIGRAM(S): 500 INJECTION, POWDER, FOR SOLUTION INTRAMUSCULAR; INTRAVENOUS at 22:50

## 2021-12-20 RX ADMIN — Medication 75 MICROGRAM(S): at 05:53

## 2021-12-20 RX ADMIN — Medication 10 MILLIGRAM(S): at 20:05

## 2021-12-20 RX ADMIN — Medication 26.25 MILLIGRAM(S): at 14:06

## 2021-12-20 RX ADMIN — CARBIDOPA AND LEVODOPA 1 TABLET(S): 25; 100 TABLET ORAL at 14:45

## 2021-12-20 RX ADMIN — VALACYCLOVIR 1000 MILLIGRAM(S): 500 TABLET, FILM COATED ORAL at 07:23

## 2021-12-20 NOTE — CHART NOTE - NSCHARTNOTEFT_GEN_A_CORE
Called by nursing staff at 5:00pm about exam findings of dilated right pupil and pinpoint left pupil. On exam:     PHYSICAL EXAM:    Constitutional: NAD, patient was alert and oriented at time of evaluation.   Head: NC/AT  Eyes: right pupil 6-7mm dilated and minimally reactive, left pupil is pinpoint, EOMI, anicteric sclera  ENT: no nasal discharge; uvula midline, no oropharyngeal erythema or exudates; MMM  Neck: supple; no JVD or thyromegaly   Respiratory: CTA B/L; no W/R/R, no retractions   Cardiac: +S1/S2; RRR; no M/R/G; PMI non-displaced  Extremities: WWP, no clubbing or cyanosis; no peripheral edema  Musculoskeletal: NROM x4; no joint swelling, tenderness or erythema  Neurologic: AAOx2; CNII-XII grossly intact; no focal deficits, strength 5/5 all 4,  strength 5/5 all 4.      Although patient appears to be stable and w/o acute deficits other than unreactive pupil and difference in size, still think that CT head was warranted based on documentation on previous days of pupils being equal and reactive. Had stat CT head non contrast done which showed no acute findings.

## 2021-12-20 NOTE — CHART NOTE - NSCHARTNOTEFT_GEN_A_CORE
5am called by RN as patient pulled out NG tube. Upon assessment of patient, she is AAOx2, awake and responsive. Able to swallow applesauce via bedside dysphagia screen. RN will crush meds and give to patient as she is able to tolerate. Diet changed to pureed. Pending official S&S eval in AM. 5am called by RN as patient pulled out NG tube. Upon assessment of patient, she is AAOx2, awake and responsive. Able to swallow applesauce via bedside dysphagia screen. RN will crush meds and give to patient as she is able to tolerate. Diet changed to pureed. Aspiration precautions ordered. Pending official S&S eval in AM. 5am called by RN as patient pulled out NG tube. Upon assessment of patient, she is AAOx2, awake and responsive. Able to swallow applesauce via bedside dysphagia screen. RN will crush meds and give to patient as she is able to tolerate. Diet changed to NPO except meds. Aspiration precautions ordered. Pending official S&S eval in AM.

## 2021-12-20 NOTE — DISCHARGE NOTE PROVIDER - DETAILS OF MALNUTRITION DIAGNOSIS/DIAGNOSES
This patient has been assessed with a concern for Malnutrition and was treated during this hospitalization for the following Nutrition diagnosis/diagnoses:     -  12/17/2021: Moderate protein-calorie malnutrition   -  12/17/2021: Underweight (BMI < 19)

## 2021-12-20 NOTE — PROCEDURE NOTE - ADDITIONAL PROCEDURE DETAILS
not a formal urology consult  urology called for difficult olvera placement. per nursing attempted CIC multiple times with coiling of catheter.  TOV as per primary team when patient ambulatory and with good bowel function

## 2021-12-20 NOTE — BH CONSULTATION LIAISON PROGRESS NOTE - NSBHASSESSMENTFT_PSY_ALL_CORE
71yo woman with a history of schizophrenia/schizoaffective d/o and Parkinson's dementia who presents with slowly resolving hypoactive delirium following acutely delirious following transfer from [facility] as RRT for AMS with minimal responsiveness, found to have a UTI. Unclear if any other acute contributing factors; observed to have adverse response with oversedation to lorazepam while on [facility], brief introduction of clozapine over last several days unlikely to cause sx. Presentation still not suggestive of NMS but recommend holding standing antipsychotics and benzodiazepines given ongoing delirium without agitation. Appreciate ongoing w/u including EEG monitoring. Will monitor with plan to resume olanzapine for treatment of psychosis and prior shabbir as delirium resolves given high risk for agitation and delusional thinking.     DDx delirium, schizophrenia/schizoaffective d/o, neurocognitive d/o    RECOMMENDATIONS  -enhanced supervision, low threshold to resume 1:1  -hold standing antipsychotics for now in setting of slowly resolving delirium  -hold benzodiazepines given prior adverse response with oversedation  -olanzapine 2.5mg po/IM q8H PRN for acute agitation that is not redirectable  -delirium precautions  -will likely need to return to [facility] for further stabilization once medically ready  -will follow

## 2021-12-20 NOTE — PROGRESS NOTE ADULT - PROBLEM SELECTOR PLAN 11
F: maintenance fluids  E: replete PRN  N: NPO with tube feeds  DVT ppx: lovenox  GI PPx: None    FULL CODE    Dispo: [facility]ADIA

## 2021-12-20 NOTE — DISCHARGE NOTE PROVIDER - NSDCMRMEDTOKEN_GEN_ALL_CORE_FT
ammonium lactate 12% topical cream: 1 application topically 2 times a day  benztropine: 0.25 milligram(s) orally 2 times a day  carbidopa-levodopa 25 mg-100 mg oral tablet: 1 tab(s) orally 3 times a day  clotrimazole 1% topical cream: 1 application topically 2 times a day  divalproex sodium 125 mg oral delayed release capsule: 6 cap(s) orally once a day (at bedtime)  divalproex sodium 500 mg oral delayed release tablet: 1 tab(s) orally once a day (in the morning)  hydrocortisone 1% topical cream: 1 application topically every 12 hours for active hemorrhoids  lactulose 10 g/15 mL oral syrup: 15 milliliter(s) orally once a day (at bedtime)  levothyroxine 75 mcg (0.075 mg) oral tablet: 1 tab(s) orally once a day  lisinopril 10 mg oral tablet: 1 tab(s) orally once a day  LORazepam 1 mg oral tablet: 1 tab(s) orally every 12 hours  metoprolol tartrate 25 mg oral tablet: 1 tab(s) orally 2 times a day  OLANZapine 10 mg intramuscular injection: 2.5 milligram(s) intramuscular every 6 hours, As needed, agitation  OLANZapine 5 mg oral tablet: 1 tab(s) orally 3 times a day  polyethylene glycol 3350 oral powder for reconstitution: 17 gram(s) orally once a day  QUEtiapine 50 mg oral tablet: 1 tab(s) orally every 4 hours, As needed, agitation  risperiDONE 0.5 mg oral tablet: 3 tab(s) orally 2 times a day  senna oral tablet: 1 tab(s) orally 2 times a day   ammonium lactate 12% topical cream: 1 application topically 2 times a day  carbidopa-levodopa 25 mg-100 mg oral tablet: 1 tab(s) orally 3 times a day  clotrimazole 1% topical cream: 1 application topically 2 times a day  hydrocortisone 1% topical cream: 1 application topically every 12 hours for active hemorrhoids  lactulose 10 g/15 mL oral syrup: 15 milliliter(s) orally once a day (at bedtime)  levothyroxine 75 mcg (0.075 mg) oral tablet: 1 tab(s) orally once a day  lisinopril 10 mg oral tablet: 1 tab(s) orally once a day  metoprolol tartrate 25 mg oral tablet: 1 tab(s) orally 2 times a day  OLANZapine 10 mg intramuscular injection: 2.5 milligram(s) intramuscular every 8 hours, As needed, agitation  polyethylene glycol 3350 oral powder for reconstitution: 17 gram(s) orally once a day  senna oral tablet: 1 tab(s) orally 2 times a day  valACYclovir 1 g oral tablet: 1 tab(s) orally every 12 hours   ammonium lactate 12% topical cream: 1 application topically 2 times a day  benztropine 1 mg oral tablet: 2 tab(s) orally 2 times a day  carbidopa-levodopa 25 mg-100 mg oral tablet: 1 tab(s) orally 3 times a day  clopidogrel 75 mg oral tablet: 1 tab(s) orally once a day  clotrimazole 1% topical cream: 1 application topically 2 times a day  Cranberry oral capsule: 400 milligram(s) orally 2 times a day  divalproex sodium 125 mg oral delayed release capsule: 6 cap(s) orally once a day (at bedtime)  fluPHENAZine 1 mg oral tablet: 1 tab(s) orally 2 times a day  gabapentin 100 mg oral capsule: 1 cap(s) orally 3 times a day  hydrocortisone 1% topical cream: 1 application topically every 12 hours for active hemorrhoids  hydrOXYzine hydrochloride 10 mg oral tablet: 1 tab(s) orally 3 times a day  lactulose 10 g/15 mL oral syrup: 15 milliliter(s) orally once a day (at bedtime)  levothyroxine 75 mcg (0.075 mg) oral tablet: 1 tab(s) orally every 24 hours  lisinopril 10 mg oral tablet: 1 tab(s) orally once a day  metoprolol tartrate 25 mg oral tablet: 1 tab(s) orally 2 times a day  Multiple Vitamins oral tablet: 1 tab(s) orally once a day  Myrbetriq 50 mg oral tablet, extended release: 1 tab(s) orally once a day  OLANZapine 10 mg intramuscular injection: 2.5 milligram(s) intramuscular every 8 hours, As needed, agitation  polyethylene glycol 3350 oral powder for reconstitution: 17 gram(s) orally every 12 hours  senna oral tablet: 2 tab(s) orally once a day (at bedtime)  traZODone 50 mg oral tablet: 1 tab(s) orally once a day (at bedtime)  valACYclovir 1 g oral tablet: 1 tab(s) orally every 12 hours   ammonium lactate 12% topical cream: 1 application topically 2 times a day  carbidopa-levodopa 25 mg-100 mg oral tablet: 1 tab(s) orally 3 times a day  cephalexin 500 mg oral capsule: 1 cap(s) orally 2 times a day   Last dose Dec 23 evening   clotrimazole 1% topical cream: 1 application topically 2 times a day  Cranberry oral capsule: 400 milligram(s) orally 2 times a day  hydrocortisone 1% topical cream: 1 application topically every 12 hours for active hemorrhoids  levothyroxine 75 mcg (0.075 mg) oral tablet: 1 tab(s) orally every 24 hours  lisinopril 10 mg oral tablet: 1 tab(s) orally once a day  metoprolol tartrate 25 mg oral tablet: 1 tab(s) orally 2 times a day  Multiple Vitamins oral tablet: 1 tab(s) orally once a day  OLANZapine 10 mg intramuscular injection: 2.5 milligram(s) intramuscular every 8 hours, As needed, agitation  polyethylene glycol 3350 oral powder for reconstitution: 17 gram(s) orally every 12 hours  senna oral tablet: 2 tab(s) orally once a day (at bedtime)  valACYclovir 1 g oral tablet: 1 tab(s) orally every 12 hours  valproic acid 250 mg oral capsule: 1 cap(s) orally every 8 hours

## 2021-12-20 NOTE — PROGRESS NOTE ADULT - SUBJECTIVE AND OBJECTIVE BOX
OVERNIGHT EVENTS: Pulled out NGT, passed bedside dysphagia screen.     SUBJECTIVE / INTERVAL HPI: Patient seen and examined at bedside. Pt has some pain where shingles are.     VITAL SIGNS:  Vital Signs Last 24 Hrs  T(C): 36.6 (20 Dec 2021 10:20), Max: 36.6 (20 Dec 2021 10:20)  T(F): 97.8 (20 Dec 2021 10:20), Max: 97.8 (20 Dec 2021 10:20)  HR: 73 (20 Dec 2021 10:20) (73 - 89)  BP: 164/98 (20 Dec 2021 10:20) (164/98 - 180/103)  BP(mean): --  RR: 19 (20 Dec 2021 10:20) (18 - 20)  SpO2: 99% (20 Dec 2021 10:20) (98% - 99%)    PHYSICAL EXAM:    General: elderly; NAD  HEENT: NC/AT; PERRL, anicteric sclera; MMM  Neck: supple  Cardiovascular: +S1/S2; RRR  Respiratory: CTA B/L; no W/R/R  Gastrointestinal: soft, NT/ND; +BSx4  Extremities: WWP; no edema, clubbing or cyanosis  Vascular: 2+ radial, DP/PT pulses B/L  Neurological: AAOx1; no focal deficits; follows commands  Psych: blunted affect    MEDICATIONS:  MEDICATIONS  (STANDING):  carbidopa/levodopa 25/100 Disintegrating Tablet 1 Tablet(s) Oral every 8 hours  cefTRIAXone   IVPB 1000 milliGRAM(s) IV Intermittent every 24 hours  enoxaparin Injectable 30 milliGRAM(s) SubCutaneous every 24 hours  insulin lispro (ADMELOG) corrective regimen sliding scale   SubCutaneous every 6 hours  levothyroxine 75 MICROGram(s) Oral every 24 hours  lisinopril 10 milliGRAM(s) Oral every 24 hours  metoprolol tartrate 25 milliGRAM(s) Oral every 12 hours  valACYclovir 1000 milliGRAM(s) Oral every 12 hours  valproate sodium IVPB 250 milliGRAM(s) IV Intermittent every 8 hours    MEDICATIONS  (PRN):  OLANZapine Injectable 2.5 milliGRAM(s) IntraMuscular every 8 hours PRN agitation      ALLERGIES:  Allergies    penicillin (Hives; Rash)    Intolerances        LABS:                        11.9   6.21  )-----------( 138      ( 20 Dec 2021 07:14 )             39.0     12-20    145  |  110<H>  |  22  ----------------------------<  144<H>  4.5   |  24  |  0.71    Ca    10.1      20 Dec 2021 07:14  Phos  4.7     12-20  Mg     2.1     12-20    TPro  7.8  /  Alb  3.7  /  TBili  0.2  /  DBili  x   /  AST  46<H>  /  ALT  <5<L>  /  AlkPhos  119  12-19        CAPILLARY BLOOD GLUCOSE      POCT Blood Glucose.: 128 mg/dL (20 Dec 2021 09:01)      RADIOLOGY & ADDITIONAL TESTS: Reviewed.    ASSESSMENT:    PLAN:

## 2021-12-20 NOTE — EEG REPORT - NS EEG TEXT BOX
[facility] Department of Neurology  Inpatient Continuous video-Electroencephalography Report    Acquisition Details:  Electroencephalography was acquired using a minimum of 21 channels on an [employer] Neurology system v 8.5.1 with electrode placement according to the standard International 10-20 system following ACNS (American Clinical Neurophysiology Society) guidelines for Long-Term Video EEG monitoring.  Anterior temporal T1 and T2 electrodes were utilized whenever possible.   The [employer] automated spike & seizure detections were all reviewed in detail, in addition to extensive portions of raw EEG.    Day 3: 12/19/2021 @ 07:00 AM to next morning @ 07:00 am  Background:  continuous, with predominantly delta and theta frequencies.  Symmetry:  No persistent asymmetries of voltage or frequency.  Posterior Dominant Rhythm:  6-7 symmetric, well-organized, and well-modulated.  Organization: Appropriate anterior-posterior gradient.  Voltage:  Normal (20+ uV)  Variability: Yes. 		Reactivity: Yes.  N2 sleep: Symmetric, synchronous spindles and K complexes.  Spontaneous Activity:  No epileptiform discharges.  Periodic/rhythmic activity:  None.  Events:  No electrographic seizures or significant clinical events.  Provocations:  Hyperventilation and Photic stimulation: was not performed.    Daily Summary:    Mild generalized diffuse or multifocal  dysfunction.  No epileptiform activity and no significant clinical events occurred.    Kranthi Chairez DO  Clinical Neurophysiology Fellow    Arleth Shepherd MD  Attending Neurologist, [facility] Epilepsy Program   [facility] Department of Neurology  Inpatient Continuous video-Electroencephalography Report    Acquisition Details:  Electroencephalography was acquired using a minimum of 21 channels on an [employer] Neurology system v 8.5.1 with electrode placement according to the standard International 10-20 system following ACNS (American Clinical Neurophysiology Society) guidelines for Long-Term Video EEG monitoring.  Anterior temporal T1 and T2 electrodes were utilized whenever possible.   The [employer] automated spike & seizure detections were all reviewed in detail, in addition to extensive portions of raw EEG.    Day 3: 12/19/2021 @ 07:00 AM to next morning @ 07:00 am  Background:  continuous, with predominantly delta and theta frequencies.  Symmetry:  No persistent asymmetries of voltage or frequency.  Posterior Dominant Rhythm:  8 symmetric, well-organized, and poorly-modulated.  Organization: Rudimentary.  Voltage:  Normal (20+ uV)  Variability: Yes. 		Reactivity: Yes.  N2 sleep: Absent.  Spontaneous Activity:  No epileptiform discharges.  Periodic/rhythmic activity:  None.  Events:  No electrographic seizures or significant clinical events.  Provocations:  Hyperventilation and Photic stimulation: was not performed.    Daily Summary:    Mild generalized diffuse or multifocal  dysfunction.  No epileptiform activity and no significant clinical events occurred.    Kranthi Chairez DO  Clinical Neurophysiology Fellow    Arleth Shepherd MD  Attending Neurologist, [facility] Epilepsy Program   [facility] Department of Neurology  Inpatient Continuous video-Electroencephalography Report    Acquisition Details:  Electroencephalography was acquired using a minimum of 21 channels on an [employer] Neurology system v 8.5.1 with electrode placement according to the standard International 10-20 system following ACNS (American Clinical Neurophysiology Society) guidelines for Long-Term Video EEG monitoring.  Anterior temporal T1 and T2 electrodes were utilized whenever possible.   The [employer] automated spike & seizure detections were all reviewed in detail, in addition to extensive portions of raw EEG.    Day 3: 12/19/2021 @ 07:00 AM to next morning @ 07:00 am  Background:  continuous, with predominantly delta and theta frequencies.  Symmetry:  No persistent asymmetries of voltage or frequency.  Posterior Dominant Rhythm:  8 symmetric, well-organized, and poorly-modulated.  Organization: Rudimentary.  Voltage:  Normal (20+ uV)  Variability: Yes. 		Reactivity: Yes.  N2 sleep: Absent.  Spontaneous Activity:  No epileptiform discharges.  Periodic/rhythmic activity:  None.  Events:  No electrographic seizures or significant clinical events.  Provocations:  Hyperventilation and Photic stimulation: was not performed.    Daily Summary:    Mild generalized diffuse or multifocal  dysfunction.  No epileptiform activity and no significant clinical events occurred.    Kranthi Chairez DO  Clinical Neurophysiology Fellow    Arleth Shepherd MD  Attending Neurologist, [facility] Epilepsy Program

## 2021-12-20 NOTE — PROGRESS NOTE ADULT - ASSESSMENT
70F HTN, T2DM (a1c 6.0), asthma, hypothyroidism, seizure disorder, Parkinson's disease, hypothyroidism, schizophrenia transferred to the ED from [facility] for altered mental status. ICU was consulted for AMS and hypothermia.    NEURO  #AMS  Likely multifactorial - Most likely 2/2 polypharmacy. Other differential includes status epilepticus (as pt with known seizure history, however less likely as normal lactate), advanced dementia/PD, myxedema coms (TFTs nml), Urosepsis (+UA)  patient with imrpoved mental status today  - avoid sedatives, opiates, benzos  - vEEG report negative for seizures  - NGT placed  - psych recs: holding all antipsychotics at this time due to pts altered mental status - can give zyprexa 2.5mg Q8hrs for agitation.    #Seizure disorder  -Hold valproic acid  - VEEG negative for seizure     #Parkinson disease  - c/w carbidopa/levodopa Q8hrs via NGT    #Hypothermia in the setting of antipsychotic polypharmacy or infection - RESOLVED   Temp 85--> increasing to 94  - continue to rewarm w/ bear Joann issa   - Patient hypotensive when initially rewarming, requiring pressors     PULM  -currently protecting airway, 100% on RA  -f/u ABG    CARDIOVASCULAR  #Hypotension - RESOLVED  - fluid bolus as needed  - Off levophed  - hold home antihypertensives  - Bradycardic at times: keep pacer pads on patient, atropine at bedside  - EKG showing J point elevation and cabrera waves, consistent w/ hypothermia     #HTN  - Restarted home lopressor 25mg Q12 via NGT hold for SBP< 100 and Hr < 60    ID  #UTI (+UA)  - Unable to verbalize urinary symptoms  - S/p Ceftriaxone 1g in the ED  -c/w Ceftriaxone 1g q24h  - Urine cx +klebsiella  - blood cx NGTD x 2day    GI  - NGT placed 12/18    ENDO  #Hypothyroidism  -TFTs wnl  -continue synthroid  -monitor blood glucose  -ISS as needed    HEME  #normocytic anemia (baseline 10-11)  -H/H stable  -f/u iron studies    RENAL  # Hyperkalemia RESOLVED  K 5.8 in ED  s/p insulin and dextrose  - trend on bmp    F: None   E: Replete as necessary K>4 Mg>2  N: glucerna 1.2  DVT Prophylaxis: lovenox 30mg Q12  GI prophylaxis: None   CODE STATUS: FULL  DISPO:RMF   70F HTN, T2DM (a1c 6.0), asthma, hypothyroidism, seizure disorder, Parkinson's disease, hypothyroidism, schizophrenia transferred to the ED from [facility] for altered mental status. Initially admitted to ICU for AMS, hypothermia, and hypotension requiring levophed likely 2/2 UTI vs polypharmacy. s/p pressors with NGT placed for nutritional support and stepped down to [facility], now stable for RMF.

## 2021-12-20 NOTE — BH CONSULTATION LIAISON PROGRESS NOTE - NSBHFUPINTERVALHXFT_PSY_A_CORE
Psychiatry f/u for management of delirium, h/o recent psychosis and shabbir with agitation treated on [facility] prior to RRT for AMS. Interim hx reviewed. Pt transferred to [facility], receiving treatment for UTI and undergoing vEEG monitoring w/o reported seizure activity. on contact precautions for shingles. Continues to receive VPA as AED, antipsychotics held. No PRN medications required for agitation. Pulled out NGT overnight and pending s&s eval.    On interview this AM, pt awake, calm, oriented to self, "[facility]", month but not exact date/year, states that her birthday has passed so it is not the 20th when informed of the date, then stating repeatedly "is that a trick?". Pt requested to eat scrambled eggs and to be left alone. When inquired about specific sx such as paranoia/concern about others trying to harm her she stated "you are". She did not answer questions about hallucinations. She declined to further participate in assessment. No voiced SI or HI

## 2021-12-20 NOTE — DISCHARGE NOTE PROVIDER - NSDCCPCAREPLAN_GEN_ALL_CORE_FT
PRINCIPAL DISCHARGE DIAGNOSIS  Diagnosis: AMS (altered mental status)  Assessment and Plan of Treatment:       SECONDARY DISCHARGE DIAGNOSES  Diagnosis: Hypothermia, endogenous  Assessment and Plan of Treatment:     Diagnosis: Acute UTI  Assessment and Plan of Treatment: A UTI is an infection involving any part of the  urinary tract which includes the urethra, urinary  bladder, ureters and kidneys. The urethra goes  between the bladder and the outside. The ureters  run between the kidneys and the bladder. Most  infections involve the lower tract– the urethra  (urethritis) and/or urinary bladder (cystitis). These  can be painful and annoying. You were treated with antibiotics and your symptoms and infection improved. Please stay hydrated, and let your doctor know if you have symptoms such as pain when you go to the bathroom and frequent urination     PRINCIPAL DISCHARGE DIAGNOSIS  Diagnosis: AMS (altered mental status)  Assessment and Plan of Treatment: You were admitted to the hospital with altered mental status. This could have been due to medications, infection, or toxic chemicals. This caused you to become confused and behave differently from your baseline.        SECONDARY DISCHARGE DIAGNOSES  Diagnosis: Acute UTI  Assessment and Plan of Treatment: A UTI is an infection involving any part of the  urinary tract which includes the urethra, urinary  bladder, ureters and kidneys. The urethra goes  between the bladder and the outside. The ureters  run between the kidneys and the bladder. Most  infections involve the lower tract– the urethra  (urethritis) and/or urinary bladder (cystitis). These  can be painful and annoying. You were treated with antibiotics and your symptoms and infection improved. Please stay hydrated, and let your doctor know if you have symptoms such as pain when you go to the bathroom and frequent urination

## 2021-12-20 NOTE — BH CONSULTATION LIAISON PROGRESS NOTE - ORIENTATION
partially oriented to location (hospital), oriented to month not date/year, disoriented to situation

## 2021-12-20 NOTE — SWALLOW BEDSIDE ASSESSMENT ADULT - NS SPL SWALLOW CLINIC TRIAL FT
Oral stage is significant for inefficient bolus stripping from spoon and prolonged bolus processing with delayed bolus transport. Given lack of upper dentition and poor mental status, other textures were not tried at this time. Pt with no overt signs of airway protection deficits. This service will continue to monitor tolerance 1-2 days.

## 2021-12-20 NOTE — DISCHARGE NOTE PROVIDER - HOSPITAL COURSE
#Discharge: do not delete    Patient is __ yo M/F with past medical history of _____ presented with _____, found to have _____ (one liner)    Hospital course (by problem):     Patient was discharged to: (home/[facility]/acute rehab/hospice, etc, and with what services – home health PT/RN? Home O2?)    New medications:   Changes to old medications:  Medications that were stopped:    Items to follow up as outpatient:    Physical exam at the time of discharge:       70F HTN, T2DM (a1c 6.0), asthma, hypothyroidism, seizure disorder, Parkinson's disease, hypothyroidism, schizophrenia transferred to the ED from [facility] for altered mental status. Pt has had a prolonged hospital course, as she was initially transferred to [facility] from [facility] on 11/21 for lethargy and altered mental status. The patient underwent a comprehensive neuro/stroke workup with CTH, CTA head and neck and CT perfusion being grossly negative for any acute pathology. The patient was then transferred to [facility] on 12/7 for active psychosis and schizophrenia requiring physical and chemical restraints. While on [facility], the patient was placed on multiple different psychotropic medications. Per aide at bedside who has worked with patient since her admission to [facility], patient has not been her usual self. Pt is normally active, agitated at baseline, however was noted to be lethargic and somnolent upon tr. Pt reportedly received a PRN Zyprexa and 2mg ativan the night before, and has been sleeping in bed all morning. She missed her meals and medications, so when the aide went to wake her up around 8pm, pt was minimally responsive, bradycardic and cool to touch. A rapid response was called, and pt was brought to the ED. Unable to obtain History/ROS as pt obtunded and responsive only to noxious stimuli. Patient admitted to MICU for further management. Patient started on levophed for hypotension and given ceftriaxone for positive UA. Patients mental status improved as more awake and alert however does not respond to questions appropriately, only responds yes to wanting to talk to her sister but no further improvement. Psych consulted, advised to hold off all psychotropic medication except Zyprexa PRN for agitation. Patient weaned off pressors at this time and VSS. NGT placed as pt was unable to swallow due to AMS. EEG report negative for seizures. Pt deemed stable for step down from MICU to telemetry unit. Pt afebrile and HR WNL. Patient was stepped down to the regional medical floors ofr further management. Urine culture grew Klebsiella sensitive to ceftriaxone. Speech and swallow evaluated patient and recommended:     Patient is medically optimized for discharge to inpatient psych.     Hospital course (by problem):   #Toxic metabolic encephalopathy.   likely 2/2 UTI vs polypharmacy  UC growing >100,000 klebsiella   - f/u psych recs  - c/w CTX for 7 days, can transition to PO     #Chronic schizophrenia.   Holding psychotropic medications at this time due to pt's AMS.  - f/u psych recs  - Zyprexa 2.5mg IM q8 PRN agitation.    # Hypothermia not due to cold exposure.   likely 2/2 sepsis vs polypharmacy  - continue to monitor.    #Hypothyroidism.    - restart home synthroid 75mcg daily.    Patient was discharged to: (home/[facility]/acute rehab/hospice, etc, and with what services – home health PT/RN? Home O2?)    New medications:   Changes to old medications:  Medications that were stopped:    Items to follow up as outpatient:    Physical exam at the time of discharge:  General: elderly; NAD  HEENT: NC/AT; PERRL, anicteric sclera; MMM  Neck: supple  Cardiovascular: +S1/S2; RRR  Respiratory: CTA B/L; no W/R/R  Gastrointestinal: soft, NT/ND; +BSx4  Extremities: WWP; no edema, clubbing or cyanosis  Vascular: 2+ radial, DP/PT pulses B/L  Neurological: AAOx1-2; no focal deficits; follows commands  Psych: blunted affect     70F HTN, T2DM (a1c 6.0), asthma, hypothyroidism, seizure disorder, Parkinson's disease, hypothyroidism, schizophrenia transferred to the ED from [facility] for altered mental status. Pt has had a prolonged hospital course, as she was initially transferred to [facility] from [facility] on 11/21 for lethargy and altered mental status. The patient underwent a comprehensive neuro/stroke workup with CTH, CTA head and neck and CT perfusion being grossly negative for any acute pathology. The patient was then transferred to [facility] on 12/7 for active psychosis and schizophrenia requiring physical and chemical restraints. While on [facility], the patient was placed on multiple different psychotropic medications. Per aide at bedside who has worked with patient since her admission to [facility], patient has not been her usual self. Pt is normally active, agitated at baseline, however was noted to be lethargic and somnolent upon tr. Pt reportedly received a PRN Zyprexa and 2mg ativan the night before, and has been sleeping in bed all morning. She missed her meals and medications, so when the aide went to wake her up around 8pm, pt was minimally responsive, bradycardic and cool to touch. A rapid response was called, and pt was brought to the ED. Unable to obtain History/ROS as pt obtunded and responsive only to noxious stimuli. Patient admitted to MICU for further management. Patient started on levophed for hypotension and given ceftriaxone for positive UA. Patients mental status improved as more awake and alert however does not respond to questions appropriately, only responds yes to wanting to talk to her sister but no further improvement. Psych consulted, advised to hold off all psychotropic medication except Zyprexa PRN for agitation. Patient weaned off pressors at this time and VSS. NGT placed as pt was unable to swallow due to AMS. EEG report negative for seizures. Pt deemed stable for step down from MICU to telemetry unit. Pt afebrile and HR WNL. Patient was stepped down to the regional medical floors ofr further management. Urine culture grew Klebsiella sensitive to ceftriaxone. Speech and swallow evaluated patient and recommended easy to chew diet.     Patient is medically optimized for discharge to inpatient psych.     Hospital course (by problem):   #Toxic metabolic encephalopathy, improving  likely 2/2 UTI vs polypharmacy  UC growing >100,000 klebsiella   - f/u psych recs  - c/w CTX for 7 days, can transition to PO     #Chronic schizophrenia.   Holding psychotropic medications at this time due to pt's AMS.  - f/u psych recs  - Zyprexa 2.5mg IM q8 PRN agitation.    #Aniscoria  - anisocoria (R dilated, L constricted)     # Hypothermia not due to cold exposure.   likely 2/2 sepsis vs polypharmacy  - continue to monitor.    #Hypothyroidism.    - restart home synthroid 75mcg daily.    Patient was discharged to: (home/[facility]/acute rehab/hospice, etc, and with what services – home health PT/RN? Home O2?)    New medications: Keflex  Changes to old medications:  Medications that were stopped:    Items to follow up as outpatient:    Physical exam at the time of discharge:  General: elderly; NAD  HEENT: NC/AT; anisocoria (R dilated, L constricted) , anicteric sclera; MMM  Neck: supple  Cardiovascular: +S1/S2; RRR  Respiratory: CTA B/L; no W/R/R  Gastrointestinal: soft, NT/ND; +BSx4  Extremities: WWP; no edema, clubbing or cyanosis  Vascular: 2+ radial, DP/PT pulses B/L  Neurological: AAOx1-2; no focal deficits; follows commands  Psych: blunted affect     70F HTN, T2DM (a1c 6.0), asthma, hypothyroidism, seizure disorder, Parkinson's disease, hypothyroidism, schizophrenia transferred to the ED from [facility] for altered mental status. Pt has had a prolonged hospital course, as she was initially transferred to [facility] from [facility] on 11/21 for lethargy and altered mental status. The patient underwent a comprehensive neuro/stroke workup with CTH, CTA head and neck and CT perfusion being grossly negative for any acute pathology. The patient was then transferred to [facility] on 12/7 for active psychosis and schizophrenia requiring physical and chemical restraints. While on [facility], the patient was placed on multiple different psychotropic medications. Per aide at bedside who has worked with patient since her admission to [facility], patient has not been her usual self. Pt is normally active, agitated at baseline, however was noted to be lethargic and somnolent upon tr. Pt reportedly received a PRN Zyprexa and 2mg ativan the night before, and has been sleeping in bed all morning. She missed her meals and medications, so when the aide went to wake her up around 8pm, pt was minimally responsive, bradycardic and cool to touch. A rapid response was called, and pt was brought to the ED. Unable to obtain History/ROS as pt obtunded and responsive only to noxious stimuli. Patient admitted to MICU for further management. Patient started on levophed for hypotension and given ceftriaxone for positive UA. Patients mental status improved as more awake and alert however does not respond to questions appropriately, only responds yes to wanting to talk to her sister but no further improvement. Psych consulted, advised to hold off all psychotropic medication except Zyprexa PRN for agitation. Patient weaned off pressors at this time and VSS. NGT placed as pt was unable to swallow due to AMS. EEG report negative for seizures. Pt deemed stable for step down from MICU to telemetry unit. Pt afebrile and HR WNL. Patient was stepped down to the regional medical floors ofr further management. Urine culture grew Klebsiella sensitive to ceftriaxone. Speech and swallow evaluated patient and recommended easy to chew diet.     Patient is medically optimized for discharge to inpatient psych.     Hospital course (by problem):   #Toxic metabolic encephalopathy, improving  likely 2/2 UTI vs polypharmacy  UC growing >100,000 klebsiella   - f/u psych recs  - c/w CTX for 7 days, can transition to PO     #Chronic schizophrenia.   Holding psychotropic medications at this time due to pt's AMS.  - f/u psych recs  - Zyprexa 2.5mg IM q8 PRN agitation.    #Aniscoria  - anisocoria (R dilated, L constricted)     # Hypothermia not due to cold exposure.   likely 2/2 sepsis vs polypharmacy  - continue to monitor.    #Hypothyroidism.    - restart home synthroid 75mcg daily.    #Shingles  lesions crusted, no need for airborne precautions  - c/w valtrex 1000mg BID    Patient was discharged to: (home/[facility]/acute rehab/hospice, etc, and with what services – home health PT/RN? Home O2?)    New medications: Keflex, valtrex  Changes to old medications:  Medications that were stopped:    Items to follow up as outpatient:    Physical exam at the time of discharge:  General: elderly; NAD  HEENT: NC/AT; anisocoria (R dilated, L constricted) , anicteric sclera; MMM  Neck: supple  Cardiovascular: +S1/S2; RRR  Respiratory: CTA B/L; no W/R/R  Gastrointestinal: soft, NT/ND; +BSx4  Extremities: WWP; no edema, clubbing or cyanosis  Vascular: 2+ radial, DP/PT pulses B/L  Neurological: AAOx1-2; no focal deficits; follows commands  Psych: blunted affect     70F HTN, T2DM (a1c 6.0), asthma, hypothyroidism, seizure disorder, Parkinson's disease, hypothyroidism, schizophrenia transferred to the ED from [facility] for altered mental status. Pt has had a prolonged hospital course, as she was initially transferred to [facility] from [facility] on 11/21 for lethargy and altered mental status. The patient underwent a comprehensive neuro/stroke workup with CTH, CTA head and neck and CT perfusion being grossly negative for any acute pathology. The patient was then transferred to [facility] on 12/7 for active psychosis and schizophrenia requiring physical and chemical restraints. While on [facility], the patient was placed on multiple different psychotropic medications. Per aide at bedside who has worked with patient since her admission to [facility], patient has not been her usual self. Pt is normally active, agitated at baseline, however was noted to be lethargic and somnolent upon tr. Pt reportedly received a PRN Zyprexa and 2mg ativan the night before, and has been sleeping in bed all morning. She missed her meals and medications, so when the aide went to wake her up around 8pm, pt was minimally responsive, bradycardic and cool to touch. A rapid response was called, and pt was brought to the ED. Unable to obtain History/ROS as pt obtunded and responsive only to noxious stimuli. Patient admitted to MICU for further management. Patient started on levophed for hypotension and given ceftriaxone for positive UA. Patients mental status improved as more awake and alert however does not respond to questions appropriately, only responds yes to wanting to talk to her sister but no further improvement. Psych consulted, advised to hold off all psychotropic medication except Zyprexa PRN for agitation. Patient weaned off pressors at this time and VSS. NGT placed as pt was unable to swallow due to AMS. EEG report negative for seizures. Pt deemed stable for step down from MICU to telemetry unit. Pt afebrile and HR WNL. Patient was stepped down to the regional medical floors ofr further management. Urine culture grew Klebsiella sensitive to ceftriaxone. Speech and swallow evaluated patient and recommended easy to chew diet.     Patient is medically optimized for discharge to inpatient psych.     Hospital course (by problem):   #Toxic metabolic encephalopathy, improving  likely 2/2 UTI vs polypharmacy  UC growing >100,000 klebsiella   - f/u psych recs  - c/w CTX for 7 days, can transition to PO     #Chronic schizophrenia.   Holding psychotropic medications at this time due to pt's AMS.  - f/u psych recs  - Zyprexa 2.5mg IM q8 PRN agitation.    #Aniscoria  - anisocoria (R dilated, L constricted)     # Hypothermia not due to cold exposure.   likely 2/2 sepsis vs polypharmacy  - continue to monitor.    #Hypothyroidism.    - restart home synthroid 75mcg daily.    #Shingles  lesions crusted, no need for airborne precautions  - c/w valtrex 1000mg BID    Patient was discharged to: SNF     New medications: Keflex, valtrex  Changes to old medications:  Medications that were stopped:    Items to follow up as outpatient:    Physical exam at the time of discharge:  General: elderly; NAD  HEENT: NC/AT; anisocoria (R dilated, L constricted) , anicteric sclera; MMM  Neck: supple  Cardiovascular: +S1/S2; RRR  Respiratory: CTA B/L; no W/R/R  Gastrointestinal: soft, NT/ND; +BSx4  Extremities: WWP; no edema, clubbing or cyanosis  Vascular: 2+ radial, DP/PT pulses B/L  Neurological: AAOx1-2; no focal deficits; follows commands  Psych: blunted affect

## 2021-12-21 DIAGNOSIS — R33.9 RETENTION OF URINE, UNSPECIFIED: ICD-10-CM

## 2021-12-21 DIAGNOSIS — Z86.19 PERSONAL HISTORY OF OTHER INFECTIOUS AND PARASITIC DISEASES: ICD-10-CM

## 2021-12-21 LAB
CULTURE RESULTS: SIGNIFICANT CHANGE UP
CULTURE RESULTS: SIGNIFICANT CHANGE UP
GLUCOSE BLDC GLUCOMTR-MCNC: 106 MG/DL — HIGH (ref 70–99)
GLUCOSE BLDC GLUCOMTR-MCNC: 115 MG/DL — HIGH (ref 70–99)
GLUCOSE BLDC GLUCOMTR-MCNC: 167 MG/DL — HIGH (ref 70–99)
GLUCOSE BLDC GLUCOMTR-MCNC: 178 MG/DL — HIGH (ref 70–99)
SPECIMEN SOURCE: SIGNIFICANT CHANGE UP
SPECIMEN SOURCE: SIGNIFICANT CHANGE UP

## 2021-12-21 PROCEDURE — 99232 SBSQ HOSP IP/OBS MODERATE 35: CPT

## 2021-12-21 PROCEDURE — 95720 EEG PHY/QHP EA INCR W/VEEG: CPT

## 2021-12-21 PROCEDURE — 99232 SBSQ HOSP IP/OBS MODERATE 35: CPT | Mod: GC

## 2021-12-21 RX ORDER — LEVOTHYROXINE SODIUM 125 MCG
1 TABLET ORAL
Qty: 0 | Refills: 0 | DISCHARGE
Start: 2021-12-21

## 2021-12-21 RX ORDER — LEVOTHYROXINE SODIUM 125 MCG
75 TABLET ORAL EVERY 24 HOURS
Refills: 0 | Status: DISCONTINUED | OUTPATIENT
Start: 2021-12-22 | End: 2021-12-23

## 2021-12-21 RX ORDER — POLYETHYLENE GLYCOL 3350 17 G/17G
17 POWDER, FOR SOLUTION ORAL
Qty: 0 | Refills: 0 | DISCHARGE
Start: 2021-12-21

## 2021-12-21 RX ORDER — SENNA PLUS 8.6 MG/1
2 TABLET ORAL
Qty: 0 | Refills: 0 | DISCHARGE
Start: 2021-12-21

## 2021-12-21 RX ORDER — CEPHALEXIN 500 MG
1 CAPSULE ORAL
Qty: 6 | Refills: 0
Start: 2021-12-21 | End: 2021-12-23

## 2021-12-21 RX ORDER — CEPHALEXIN 500 MG
500 CAPSULE ORAL EVERY 12 HOURS
Refills: 0 | Status: COMPLETED | OUTPATIENT
Start: 2021-12-21 | End: 2021-12-23

## 2021-12-21 RX ADMIN — Medication 2: at 17:57

## 2021-12-21 RX ADMIN — Medication 26.25 MILLIGRAM(S): at 22:03

## 2021-12-21 RX ADMIN — VALACYCLOVIR 1000 MILLIGRAM(S): 500 TABLET, FILM COATED ORAL at 00:13

## 2021-12-21 RX ADMIN — VALACYCLOVIR 1000 MILLIGRAM(S): 500 TABLET, FILM COATED ORAL at 12:51

## 2021-12-21 RX ADMIN — CARBIDOPA AND LEVODOPA 1 TABLET(S): 25; 100 TABLET ORAL at 22:05

## 2021-12-21 RX ADMIN — SENNA PLUS 2 TABLET(S): 8.6 TABLET ORAL at 22:04

## 2021-12-21 RX ADMIN — Medication 75 MICROGRAM(S): at 06:30

## 2021-12-21 RX ADMIN — CARBIDOPA AND LEVODOPA 1 TABLET(S): 25; 100 TABLET ORAL at 00:39

## 2021-12-21 RX ADMIN — Medication 25 MILLIGRAM(S): at 06:30

## 2021-12-21 RX ADMIN — POLYETHYLENE GLYCOL 3350 17 GRAM(S): 17 POWDER, FOR SOLUTION ORAL at 19:00

## 2021-12-21 RX ADMIN — ENOXAPARIN SODIUM 30 MILLIGRAM(S): 100 INJECTION SUBCUTANEOUS at 22:03

## 2021-12-21 RX ADMIN — CARBIDOPA AND LEVODOPA 1 TABLET(S): 25; 100 TABLET ORAL at 06:32

## 2021-12-21 RX ADMIN — Medication 26.25 MILLIGRAM(S): at 14:49

## 2021-12-21 RX ADMIN — Medication 26.25 MILLIGRAM(S): at 06:29

## 2021-12-21 RX ADMIN — POLYETHYLENE GLYCOL 3350 17 GRAM(S): 17 POWDER, FOR SOLUTION ORAL at 06:29

## 2021-12-21 RX ADMIN — Medication 2: at 12:53

## 2021-12-21 RX ADMIN — Medication 500 MILLIGRAM(S): at 18:00

## 2021-12-21 RX ADMIN — Medication 25 MILLIGRAM(S): at 19:00

## 2021-12-21 RX ADMIN — CARBIDOPA AND LEVODOPA 1 TABLET(S): 25; 100 TABLET ORAL at 14:49

## 2021-12-21 RX ADMIN — LISINOPRIL 10 MILLIGRAM(S): 2.5 TABLET ORAL at 06:29

## 2021-12-21 NOTE — PROGRESS NOTE ADULT - PROBLEM SELECTOR PLAN 1
likely 2/2 UTI vs polypharmacy  - f/u psych recs  - c/w CTX likely 2/2 UTI vs polypharmacy  - f/u psych recs  - switch CTX to Keflex 500mg BID for 7 day course (12/17/21 - 12/23/21)

## 2021-12-21 NOTE — PROGRESS NOTE ADULT - PROBLEM SELECTOR PLAN 4
- restart home synthroid 75mcg daily    #Thrombocytopenia  - continue to monitor - c/w home synthroid 75mcg daily    #Thrombocytopenia  - continue to monitor

## 2021-12-21 NOTE — PHYSICAL THERAPY INITIAL EVALUATION ADULT - GAIT DEVIATIONS NOTED, PT EVAL
Pt w/ slightly crouched posture. Difficulty w/ weight shifting abilities to negotiate steps no LOB/falls noted. Dec step length/michel noted. PT assist w/ RW management./decreased michel/increased time in double stance/decreased step length/decreased stride length/decreased weight-shifting ability

## 2021-12-21 NOTE — BH CONSULTATION LIAISON PROGRESS NOTE - NSBHASSESSMENTFT_PSY_ALL_CORE
69yo woman with a history of schizophrenia/schizoaffective d/o and Parkinson's dementia who presents with slowly resolving hypoactive delirium following transfer from [facility] as RRT for AMS with minimal responsiveness, found to have a UTI. Unclear if any other acute contributing factors; observed to have adverse response with oversedation to lorazepam while on [facility], brief introduction of clozapine last week unlikely to cause sx. Presentation still not suggestive of NMS but recommend holding standing antipsychotics and benzodiazepines given hypoactive delirium without agitation or distressing psychosis. Will monitor with plan to resume olanzapine for treatment of psychosis and prior shabbir as delirium resolves given high risk for agitation and delusional thinking. Will d/w inpatient psychiatry now that per primary team, pt has been medically cleared.    DDx delirium, schizophrenia/schizoaffective d/o, neurocognitive d/o    RECOMMENDATIONS  -enhanced supervision, low threshold to resume 1:1  -hold standing antipsychotics for now in setting of slowly resolving delirium  -hold benzodiazepines given prior adverse response with oversedation  -olanzapine 2.5mg po/IM q8H PRN for acute agitation that is not redirectable  -delirium precautions  -will likely need to return to [facility] for further stabilization once medically ready - will d/w [facility] team today  -will follow

## 2021-12-21 NOTE — BH CONSULTATION LIAISON PROGRESS NOTE - OTHER
no tremor appreciate on limited exam, pt not cooperative with full assessment, with blankets covering much of body impoverished, no voiced delusions or paranoia. No SI or HI stable posture lying back in bed moving extremities, not cooperative with physical exam not overtly responding to internal stimuli. Denied AVH

## 2021-12-21 NOTE — PROGRESS NOTE ADULT - PROBLEM SELECTOR PLAN 3
possibly 2/2 constipation. Larkin placed 12/20  - c/w bowel regimen possibly 2/2 constipation vs lack of movement following MICU stay. Larkin placed 12/20  - c/w bowel regimen  - OOB to chair  - encourage ambulation as tolerated

## 2021-12-21 NOTE — BH CONSULTATION LIAISON PROGRESS NOTE - NSBHCHARTREVIEWINVESTIGATE_PSY_A_CORE FT
CT HEAD 12/16  IMPRESSION:  No acute intracranial hemorrhage, transcortical infarct, or mass effect.    EEG No epileptiform activity and no significant clinical events occurred.
no new ekg seen
CTH 12/20 without acute abnormalities, +NPH

## 2021-12-21 NOTE — CONSULT NOTE ADULT - ASSESSMENT
per Internal Medicine    71 yo F HTN, T2DM (a1c 6.0), asthma, hypothyroidism, seizure disorder, Parkinson's disease, hypothyroidism, schizophrenia transferred to the ED from [facility] for altered mental status. Initially admitted to ICU for AMS, hypothermia, and hypotension requiring levophed likely 2/2 UTI vs polypharmacy. s/p pressors with NGT placed for nutritional support and stepped down to [facility], now stable for RMF.       Problem/Plan - 1:  ·  Problem: Toxic metabolic encephalopathy.   ·  Plan: likely 2/2 UTI vs polypharmacy  - f/u psych recs  - c/w CTX.    Problem/Plan - 2:  ·  Problem: Chronic schizophrenia.   ·  Plan: Holding psychotropic medications at this time due to pt's AMS.  - f/u psych recs  - Zyprexa 2.5mg IM q8 PRN agitation.    Problem/Plan - 3:  ·  Problem: Urinary retention.   ·  Plan: possibly 2/2 constipation. Larkin placed 12/20  - c/w bowel regimen.    Problem/Plan - 4:  ·  Problem: Hypothyroidism.   ·  Plan: - restart home synthroid 75mcg daily    #Thrombocytopenia  - continue to monitor.    Problem/Plan - 5:  ·  Problem: DM (diabetes mellitus).   ·  Plan: - ISS with tube feeds.    Problem/Plan - 6:  ·  Problem: Parkinson disease.   ·  Plan: - c/w home carbidopa/levodopa.    Problem/Plan - 7:  ·  Problem: HTN (hypertension).   ·  Plan: - holding home lisinopril 10mg daily, restart as appropriate  - c/w metoprolol 25mg daily with hold parameters.    Problem/Plan - 8:  ·  Problem: Hypothermia not due to cold exposure.   ·  Plan: Resolved. likely 2/2 sepsis vs polypharmacy  - continue to monitor.    Problem/Plan - 9:  ·  Problem: Seizure disorder.   ·  Plan: - c/w Depacon 250mg IV q8 until pt can tolerate PO.    Problem/Plan - 10:  ·  Problem: Asthma.   ·  Plan; Has hx of asthma. Pt protecting airway and saturating WNL.  - continue to monitor.    Problem/Plan - 11:  ·  Problem: History of shingles.   ·  Plan: Pt found to have crusted lesions on back in dermatomal pattern.  - c/w valtrex 1g q12.    Problem/Plan - 12:  ·  Problem: Prophylactic measure.   ·  Plan: F: PO  E: replete PRN  N: pureed  DVT ppx: lovenox  GI PPx: None    FULL CODE    Dispo: RMF.

## 2021-12-21 NOTE — PHYSICAL THERAPY INITIAL EVALUATION ADULT - GROSSLY INTACT, SENSORY
Pt reported being able to sense light touch stimuli to BUE and BLE, however reports decreased sensation throughout.

## 2021-12-21 NOTE — PROGRESS NOTE ADULT - SUBJECTIVE AND OBJECTIVE BOX
OVERNIGHT EVENTS:    SUBJECTIVE / INTERVAL HPI: Patient seen and examined at bedside.     VITAL SIGNS:  Vital Signs Last 24 Hrs  T(C): 36.4 (21 Dec 2021 05:18), Max: 36.7 (20 Dec 2021 14:30)  T(F): 97.5 (21 Dec 2021 05:18), Max: 98 (20 Dec 2021 14:30)  HR: 88 (21 Dec 2021 05:18) (73 - 95)  BP: 146/83 (21 Dec 2021 05:18) (146/83 - 175/96)  BP(mean): --  RR: 17 (21 Dec 2021 05:18) (17 - 19)  SpO2: 99% (21 Dec 2021 05:18) (99% - 100%)    PHYSICAL EXAM:    General: WDWN  HEENT: NC/AT; aniscoria, anicteric sclera; MMM  Neck: supple  Cardiovascular: +S1/S2; RRR  Respiratory: CTA B/L; no W/R/R  Gastrointestinal: soft, NT/ND; +BSx4  Extremities: WWP; no edema, clubbing or cyanosis  Vascular: 2+ radial, DP/PT pulses B/L  Neurological: AAOx3; no focal deficits    MEDICATIONS:  MEDICATIONS  (STANDING):  carbidopa/levodopa 25/100 Disintegrating Tablet 1 Tablet(s) Oral every 8 hours  cefTRIAXone   IVPB 1000 milliGRAM(s) IV Intermittent every 24 hours  enoxaparin Injectable 30 milliGRAM(s) SubCutaneous every 24 hours  insulin lispro (ADMELOG) corrective regimen sliding scale   SubCutaneous every 6 hours  levothyroxine 75 MICROGram(s) Oral every 24 hours  lisinopril 10 milliGRAM(s) Oral every 24 hours  metoprolol tartrate 25 milliGRAM(s) Oral every 12 hours  polyethylene glycol 3350 17 Gram(s) Oral every 12 hours  senna 2 Tablet(s) Oral at bedtime  valACYclovir 1000 milliGRAM(s) Oral every 12 hours  valproate sodium IVPB 250 milliGRAM(s) IV Intermittent every 8 hours    MEDICATIONS  (PRN):  OLANZapine Injectable 2.5 milliGRAM(s) IntraMuscular every 8 hours PRN agitation      ALLERGIES:  Allergies    penicillin (Hives; Rash)    Intolerances        LABS:                        11.9   6.21  )-----------( 138      ( 20 Dec 2021 07:14 )             39.0     12-20    145  |  110<H>  |  22  ----------------------------<  144<H>  4.5   |  24  |  0.71    Ca    10.1      20 Dec 2021 07:14  Phos  4.7     12-20  Mg     2.1     12-20    TPro  7.8  /  Alb  3.7  /  TBili  0.2  /  DBili  x   /  AST  46<H>  /  ALT  <5<L>  /  AlkPhos  119  12-19        CAPILLARY BLOOD GLUCOSE      POCT Blood Glucose.: 115 mg/dL (21 Dec 2021 06:41)      RADIOLOGY & ADDITIONAL TESTS: Reviewed.    ASSESSMENT:    PLAN:  OVERNIGHT EVENTS: 3 episodes of nonbloody vomiting, given Zofran. Larkin placed.    SUBJECTIVE / INTERVAL HPI: Patient seen and examined at bedside. Pt is speaking to herself. Says she is feeling okay. Denies abd pain.    VITAL SIGNS:  Vital Signs Last 24 Hrs  T(C): 36.4 (21 Dec 2021 05:18), Max: 36.7 (20 Dec 2021 14:30)  T(F): 97.5 (21 Dec 2021 05:18), Max: 98 (20 Dec 2021 14:30)  HR: 88 (21 Dec 2021 05:18) (73 - 95)  BP: 146/83 (21 Dec 2021 05:18) (146/83 - 175/96)  BP(mean): --  RR: 17 (21 Dec 2021 05:18) (17 - 19)  SpO2: 99% (21 Dec 2021 05:18) (99% - 100%)    PHYSICAL EXAM:    General: WDWN  HEENT: NC/AT; anisocoria (R dilated, L constricted) conjunctiva and sclera clear, anicteric sclera; MMM  Neck: supple  Cardiovascular: +S1/S2; RRR  Respiratory: CTA B/L; no W/R/R  Gastrointestinal: soft, NT/ND; +BSx4  Extremities: WWP; no edema, clubbing or cyanosis  Vascular: 2+ radial, DP/PT pulses B/L  Neurological: AAOx3; no focal deficits    MEDICATIONS:  MEDICATIONS  (STANDING):  carbidopa/levodopa 25/100 Disintegrating Tablet 1 Tablet(s) Oral every 8 hours  cefTRIAXone   IVPB 1000 milliGRAM(s) IV Intermittent every 24 hours  enoxaparin Injectable 30 milliGRAM(s) SubCutaneous every 24 hours  insulin lispro (ADMELOG) corrective regimen sliding scale   SubCutaneous every 6 hours  levothyroxine 75 MICROGram(s) Oral every 24 hours  lisinopril 10 milliGRAM(s) Oral every 24 hours  metoprolol tartrate 25 milliGRAM(s) Oral every 12 hours  polyethylene glycol 3350 17 Gram(s) Oral every 12 hours  senna 2 Tablet(s) Oral at bedtime  valACYclovir 1000 milliGRAM(s) Oral every 12 hours  valproate sodium IVPB 250 milliGRAM(s) IV Intermittent every 8 hours    MEDICATIONS  (PRN):  OLANZapine Injectable 2.5 milliGRAM(s) IntraMuscular every 8 hours PRN agitation      ALLERGIES:  Allergies    penicillin (Hives; Rash)    Intolerances        LABS:                        11.9   6.21  )-----------( 138      ( 20 Dec 2021 07:14 )             39.0     12-20    145  |  110<H>  |  22  ----------------------------<  144<H>  4.5   |  24  |  0.71    Ca    10.1      20 Dec 2021 07:14  Phos  4.7     12-20  Mg     2.1     12-20    TPro  7.8  /  Alb  3.7  /  TBili  0.2  /  DBili  x   /  AST  46<H>  /  ALT  <5<L>  /  AlkPhos  119  12-19        CAPILLARY BLOOD GLUCOSE      POCT Blood Glucose.: 115 mg/dL (21 Dec 2021 06:41)      RADIOLOGY & ADDITIONAL TESTS: Reviewed.    ASSESSMENT:    PLAN:

## 2021-12-21 NOTE — PHYSICAL THERAPY INITIAL EVALUATION ADULT - PERTINENT HX OF CURRENT PROBLEM, REHAB EVAL
70F HTN, T2DM (a1c 6.0), asthma, hypothyroidism, seizure disorder, Parkinson's disease, hypothyroidism, schizophrenia transferred to the ED from [facility] [place] for altered mental status. Pt. has had a prolonged hospital course, as she was initially transferred to [facility] from [facility] on 11/21 for lethargy and altered mental status. The patient underwent a comprehensive neuro/stroke workup with CTH, CTA head and neck and CT perfusion being grossly negative for any acute pathology.

## 2021-12-21 NOTE — PHYSICAL THERAPY INITIAL EVALUATION ADULT - ADDITIONAL COMMENTS
Pt is a poor historian, unable to recall information. However able to state living alone in a walk up apartment (unable to quantify number of stairs). Denied falls within past 6 months.

## 2021-12-21 NOTE — CONSULT NOTE ADULT - SUBJECTIVE AND OBJECTIVE BOX
Patient is a 70y old  Female who presents with a chief complaint of AMS (18 Dec 2021 19:15)       HPI:  70F HTN, T2DM (a1c 6.0), asthma, hypothyroidism, seizure disorder, Parkinson's disease, hypothyroidism, schizophrenia transferred to the ED from [facility] for altered mental status. Pt. has had a prolonged hospital course, as she was initially transferred to [facility] from [facility] on 11/21 for lethargy and altered mental status. The patient underwent a comprehensive neuro/stroke workup with CTH, CTA head and neck and CT perfusion being grossly negative for any acute pathology. The patient was then transferred to [facility] on 12/7 for active psychosis and schizophrenia requiring physical and chemical restraints. [facility], the patient was placed on multiple different psychotropic medications. Per aide at bedside who has worked with patient since her admission to [facility], patient has not been her usual self today. Pt is normally active, agitated at baseline, however was noted to be lethargic and somnolent today. Pt reportedly received a PRN Zyprexa and 2mg ativan the night before, and has been sleeping in bed all morning. She missed her meals and medications, so when the aide went to wake her up around 8pm, pt was minimally responsive, bradycardic and cool to touch. A rapid response was called, and pt was brought to the ED. Unable to obtain History/ROS as pt obtunded and responsive only to noxious stimuli    ED Vitals: T85-->91.5 (on dana hugger), HR 45-50-->85, BP 50-130s/30-70s, RR 12-16 SpO2 %  ED Labs: WBC 4.08, H/H 10.9/35.8, plt 140, K 5.8, BUN/Cr 21/0.74, Lactate 1.0, Valproic Acid 43.8, Ammonia 28  CXR: no consolidations or effusions noted  EKG: sinus bradycardia w/ J point elevation (cabrera wave)   CTH: no acute pathology  ED Interventions: Atropine 0.5, 5U Insulin, D50, levophed, 4L NS (17 Dec 2021 00:54)      PAST MEDICAL & SURGICAL HISTORY:  Parkinson disease    Seizure disorder    Hypothyroidism    Type II diabetes mellitus    Hyperlipidemia    Schizophrenia    Hemorrhoids    Hypertension    No significant past surgical history        MEDICATIONS  (STANDING):  carbidopa/levodopa 25/100 Disintegrating Tablet 1 Tablet(s) Oral every 8 hours  cefTRIAXone   IVPB 1000 milliGRAM(s) IV Intermittent every 24 hours  enoxaparin Injectable 30 milliGRAM(s) SubCutaneous every 24 hours  insulin lispro (ADMELOG) corrective regimen sliding scale   SubCutaneous every 6 hours  levothyroxine 75 MICROGram(s) Oral every 24 hours  lisinopril 10 milliGRAM(s) Oral every 24 hours  metoprolol tartrate 25 milliGRAM(s) Oral every 12 hours  polyethylene glycol 3350 17 Gram(s) Oral every 12 hours  senna 2 Tablet(s) Oral at bedtime  valACYclovir 1000 milliGRAM(s) Oral every 12 hours  valproate sodium IVPB 250 milliGRAM(s) IV Intermittent every 8 hours    MEDICATIONS  (PRN):  OLANZapine Injectable 2.5 milliGRAM(s) IntraMuscular every 8 hours PRN agitation      FAMILY HISTORY:      CBC Full  -  ( 20 Dec 2021 07:14 )  WBC Count : 6.21 K/uL  RBC Count : 4.43 M/uL  Hemoglobin : 11.9 g/dL  Hematocrit : 39.0 %  Platelet Count - Automated : 138 K/uL  Mean Cell Volume : 88.0 fl  Mean Cell Hemoglobin : 26.9 pg  Mean Cell Hemoglobin Concentration : 30.5 gm/dL  Auto Neutrophil # : 4.48 K/uL  Auto Lymphocyte # : 0.94 K/uL  Auto Monocyte # : 0.69 K/uL  Auto Eosinophil # : 0.03 K/uL  Auto Basophil # : 0.02 K/uL  Auto Neutrophil % : 72.2 %  Auto Lymphocyte % : 15.1 %  Auto Monocyte % : 11.1 %  Auto Eosinophil % : 0.5 %  Auto Basophil % : 0.3 %      12-20    145  |  110<H>  |  22  ----------------------------<  144<H>  4.5   |  24  |  0.71    Ca    10.1      20 Dec 2021 07:14  Phos  4.7     12-20  Mg     2.1     12-20    TPro  7.8  /  Alb  3.7  /  TBili  0.2  /  DBili  x   /  AST  46<H>  /  ALT  <5<L>  /  AlkPhos  119  12-19            Radiology:    < from: Xray Chest 1 View AP/PA (12.18.21 @ 16:16) >  ACC: [id number] EXAM:  XR CHEST AP OR PA 1V                          PROCEDURE DATE:  12/18/2021          INTERPRETATION:  Clinical History: NG tube    Frontal examination of the chest demonstrates heart to be within normal   limits in transverse diameter. No acute infiltrates. Nasoenteric tube   overlying course of esophagus and left upper abdomen.    IMPRESSION: No acute infiltrates        < from: CT Head No Cont (12.20.21 @ 17:55) >  ACC: [id number] EXAM:  CT BRAIN                          PROCEDURE DATE:  12/20/2021          INTERPRETATION:  CLINICAL INDICATION: Asymmetric pupils.    TECHNIQUE: CT of the head was performed without the administration of   intravenous contrast.    COMPARISON: CT head 12/16/2021.    FINDINGS:    There is no evidence of acute infarction, intracranial hemorrhage or mass   lesion.    There is moderate ventriculomegaly which appears out of proportion to the   degree of parenchymal volume loss, withassociated bowing of the corpus   callosum, acute callosal angle, and asymmetric sulcal prominence   including horizontally oriented dilated sylvian fissures and crowding of   the gyri at the vertex, findings supportive of normal pressure   hydrocephalus in the proper clinical setting, unchanged compared to prior   imaging.    There is hypoattenuation of the subcortical and periventricular white   matter, which is nonspecific finding, but most likely represents sequela   of mild chronic microvascular ischemic disease. There are atherosclerotic   calcifications of the cavernous internal carotid arteries bilaterally.   There is prominence of the cortical sulci related to underlying mild   brain parenchymal volume loss.    There are no extra-axial fluid collections.    The visualized intraorbital contents are normal. The imaged portions of   the paranasal sinuses are aerated. The mastoid air cells are clear. The   visualized soft tissues and osseous structures appear normal.      IMPRESSION:    No acute intracranial findings.    Normal pressure hydrocephalus.              Vital Signs Last 24 Hrs  T(C): 36.4 (21 Dec 2021 05:18), Max: 36.7 (20 Dec 2021 14:30)  T(F): 97.5 (21 Dec 2021 05:18), Max: 98 (20 Dec 2021 14:30)  HR: 88 (21 Dec 2021 05:18) (73 - 95)  BP: 146/83 (21 Dec 2021 05:18) (146/83 - 175/96)  BP(mean): --  RR: 17 (21 Dec 2021 05:18) (17 - 19)  SpO2: 99% (21 Dec 2021 05:18) (99% - 100%)        REVIEW OF SYSTEMS: per HPI      Physical Exam:  on airborne and contact isolation - shingles zoster,       Neurologic Exam:    Alert and oriented to person, speech fluent w/o dysarthria, follows commands    Cranial Nerves:     II:                         pupils equal, round and reactive to light, visual fields intact   III/ IV/VI:              extraocular movements intact, neg nystagmus, neg ptosis  V:                        facial sensation intact, V1-3 normal  VII:                      face symmetric, no droop, normal eye closure and smile  VIII:                     hearing intact to finger rub bilaterally  IX and X:             no hoarseness, gag intact, palate/ uvula rise symmetrically  XI:                       SCM/ trapezius strength intact bilateral  XII:                      no tongue deviation    Motor Exam:    Right UE:             no focal weakness,  > 3+/5 througout    Left UE:               no focal weakness,  > 3+/5 througout      Right LE:             no focal weakness,  > 3+/5 througout    Left LE:                no focal weakness,  > 3+/5 througout               Sensation:           intact to light touch x 4 extremities                                               DTR:                  biceps/brachioradialis: equal                                                       patella/ankle: equal                          Gait:  not tested        PM&R Impression:    1) TME/ UTI-polypharmacy/  herpes zoster  2) no focal weakness    Recommendations/ Plan :    1) Physical therapy focusing on therapeutic exercises, bed mobility/transfer out of bed evaluation, progressive ambulation with assistive devices prn.    2) Anticipated Disposition Plan/Recs:   pending functional progress

## 2021-12-21 NOTE — PHYSICAL THERAPY INITIAL EVALUATION ADULT - ORIENTATION, REHAB EVAL
not oriented to person, place, time or situation Reoriented pt to present year being 2021./person/place

## 2021-12-21 NOTE — BH CONSULTATION LIAISON PROGRESS NOTE - NSBHFUPINTERVALHXFT_PSY_A_CORE
Psychiatry f/u for management of delirium, h/o recent shabbir, psychosis, agitated behavior in pt transferred from [facility] following RRT. Interim hx reviewed and d/w primary team yesterday. No reported agitation, no PRN medications for agitation required.    On interview, pt found lying in bed watching TV, awake, calm, very soft and mumbled speech. Pt reports she is feeling "alright" and does not voice any complaints. Unable to state location, date, or situation, nodded when orientation provided. Denies AVH. Denies SI or HI. Denies any sense of mistreatment in hospital.

## 2021-12-22 DIAGNOSIS — E78.5 HYPERLIPIDEMIA, UNSPECIFIED: ICD-10-CM

## 2021-12-22 DIAGNOSIS — F31.9 BIPOLAR DISORDER, UNSPECIFIED: ICD-10-CM

## 2021-12-22 DIAGNOSIS — F02.80 DEMENTIA IN OTHER DISEASES CLASSIFIED ELSEWHERE, UNSPECIFIED SEVERITY, WITHOUT BEHAVIORAL DISTURBANCE, PSYCHOTIC DISTURBANCE, MOOD DISTURBANCE, AND ANXIETY: ICD-10-CM

## 2021-12-22 DIAGNOSIS — G40.909 EPILEPSY, UNSPECIFIED, NOT INTRACTABLE, WITHOUT STATUS EPILEPTICUS: ICD-10-CM

## 2021-12-22 DIAGNOSIS — E11.51 TYPE 2 DIABETES MELLITUS WITH DIABETIC PERIPHERAL ANGIOPATHY WITHOUT GANGRENE: ICD-10-CM

## 2021-12-22 DIAGNOSIS — N81.4 UTEROVAGINAL PROLAPSE, UNSPECIFIED: ICD-10-CM

## 2021-12-22 DIAGNOSIS — F25.9 SCHIZOAFFECTIVE DISORDER, UNSPECIFIED: ICD-10-CM

## 2021-12-22 DIAGNOSIS — Z91.14 PATIENT'S OTHER NONCOMPLIANCE WITH MEDICATION REGIMEN: ICD-10-CM

## 2021-12-22 DIAGNOSIS — Z88.0 ALLERGY STATUS TO PENICILLIN: ICD-10-CM

## 2021-12-22 DIAGNOSIS — I10 ESSENTIAL (PRIMARY) HYPERTENSION: ICD-10-CM

## 2021-12-22 DIAGNOSIS — G20 PARKINSON'S DISEASE: ICD-10-CM

## 2021-12-22 DIAGNOSIS — L85.3 XEROSIS CUTIS: ICD-10-CM

## 2021-12-22 DIAGNOSIS — Z85.51 PERSONAL HISTORY OF MALIGNANT NEOPLASM OF BLADDER: ICD-10-CM

## 2021-12-22 DIAGNOSIS — E03.9 HYPOTHYROIDISM, UNSPECIFIED: ICD-10-CM

## 2021-12-22 DIAGNOSIS — J45.909 UNSPECIFIED ASTHMA, UNCOMPLICATED: ICD-10-CM

## 2021-12-22 DIAGNOSIS — H40.9 UNSPECIFIED GLAUCOMA: ICD-10-CM

## 2021-12-22 DIAGNOSIS — R41.9 UNSPECIFIED SYMPTOMS AND SIGNS INVOLVING COGNITIVE FUNCTIONS AND AWARENESS: ICD-10-CM

## 2021-12-22 DIAGNOSIS — Z87.891 PERSONAL HISTORY OF NICOTINE DEPENDENCE: ICD-10-CM

## 2021-12-22 LAB
GLUCOSE BLDC GLUCOMTR-MCNC: 113 MG/DL — HIGH (ref 70–99)
GLUCOSE BLDC GLUCOMTR-MCNC: 184 MG/DL — HIGH (ref 70–99)
GLUCOSE BLDC GLUCOMTR-MCNC: 185 MG/DL — HIGH (ref 70–99)
GLUCOSE BLDC GLUCOMTR-MCNC: 90 MG/DL — SIGNIFICANT CHANGE UP (ref 70–99)

## 2021-12-22 PROCEDURE — 80061 LIPID PANEL: CPT

## 2021-12-22 PROCEDURE — 82962 GLUCOSE BLOOD TEST: CPT

## 2021-12-22 PROCEDURE — 82140 ASSAY OF AMMONIA: CPT

## 2021-12-22 PROCEDURE — 83036 HEMOGLOBIN GLYCOSYLATED A1C: CPT

## 2021-12-22 PROCEDURE — 80164 ASSAY DIPROPYLACETIC ACD TOT: CPT

## 2021-12-22 PROCEDURE — 99232 SBSQ HOSP IP/OBS MODERATE 35: CPT

## 2021-12-22 PROCEDURE — 80053 COMPREHEN METABOLIC PANEL: CPT

## 2021-12-22 PROCEDURE — 36415 COLL VENOUS BLD VENIPUNCTURE: CPT

## 2021-12-22 PROCEDURE — 80165 DIPROPYLACETIC ACID FREE: CPT

## 2021-12-22 PROCEDURE — 84443 ASSAY THYROID STIM HORMONE: CPT

## 2021-12-22 PROCEDURE — U0005: CPT

## 2021-12-22 PROCEDURE — 99232 SBSQ HOSP IP/OBS MODERATE 35: CPT | Mod: GC

## 2021-12-22 PROCEDURE — U0003: CPT

## 2021-12-22 PROCEDURE — 85027 COMPLETE CBC AUTOMATED: CPT

## 2021-12-22 RX ORDER — FLUPHENAZINE HYDROCHLORIDE 1 MG/1
1 TABLET, FILM COATED ORAL
Qty: 0 | Refills: 0 | DISCHARGE

## 2021-12-22 RX ADMIN — Medication 26.25 MILLIGRAM(S): at 06:38

## 2021-12-22 RX ADMIN — Medication 26.25 MILLIGRAM(S): at 22:34

## 2021-12-22 RX ADMIN — Medication 26.25 MILLIGRAM(S): at 13:24

## 2021-12-22 RX ADMIN — Medication 25 MILLIGRAM(S): at 08:37

## 2021-12-22 RX ADMIN — ENOXAPARIN SODIUM 30 MILLIGRAM(S): 100 INJECTION SUBCUTANEOUS at 22:33

## 2021-12-22 RX ADMIN — LISINOPRIL 10 MILLIGRAM(S): 2.5 TABLET ORAL at 06:38

## 2021-12-22 RX ADMIN — VALACYCLOVIR 1000 MILLIGRAM(S): 500 TABLET, FILM COATED ORAL at 00:20

## 2021-12-22 RX ADMIN — Medication 500 MILLIGRAM(S): at 06:37

## 2021-12-22 RX ADMIN — VALACYCLOVIR 1000 MILLIGRAM(S): 500 TABLET, FILM COATED ORAL at 13:24

## 2021-12-22 RX ADMIN — Medication 75 MICROGRAM(S): at 06:38

## 2021-12-22 RX ADMIN — Medication 500 MILLIGRAM(S): at 18:33

## 2021-12-22 RX ADMIN — SENNA PLUS 2 TABLET(S): 8.6 TABLET ORAL at 22:34

## 2021-12-22 RX ADMIN — Medication 25 MILLIGRAM(S): at 18:33

## 2021-12-22 RX ADMIN — CARBIDOPA AND LEVODOPA 1 TABLET(S): 25; 100 TABLET ORAL at 06:37

## 2021-12-22 RX ADMIN — POLYETHYLENE GLYCOL 3350 17 GRAM(S): 17 POWDER, FOR SOLUTION ORAL at 08:37

## 2021-12-22 RX ADMIN — CARBIDOPA AND LEVODOPA 1 TABLET(S): 25; 100 TABLET ORAL at 13:29

## 2021-12-22 RX ADMIN — CARBIDOPA AND LEVODOPA 1 TABLET(S): 25; 100 TABLET ORAL at 22:34

## 2021-12-22 RX ADMIN — Medication 2: at 13:24

## 2021-12-22 NOTE — CHART NOTE - NSCHARTNOTEFT_GEN_A_CORE
Admitting Diagnosis:   Patient is a 71y old  Female who presents with a chief complaint of AMS (21 Dec 2021 08:42)      PAST MEDICAL & SURGICAL HISTORY:  Parkinson disease    Seizure disorder    Hypothyroidism    Type II diabetes mellitus    Hyperlipidemia    Schizophrenia    Hemorrhoids    Hypertension    No significant past surgical history        Current Nutrition Order:  Puree      PO Intake: Good (%) [   ]  Fair (50-75%) [   ] Poor (<25%) [   ]- NA NPO     GI Issues: No N/V reported at this time.  Diarrhea overnight     Pain: No complaints of pain     Skin Integrity: Matthew 13, no edema  Shingles bullae    Labs:         CAPILLARY BLOOD GLUCOSE      POCT Blood Glucose.: 185 mg/dL (22 Dec 2021 13:20)  POCT Blood Glucose.: 90 mg/dL (22 Dec 2021 06:31)  POCT Blood Glucose.: 106 mg/dL (21 Dec 2021 23:50)  POCT Blood Glucose.: 167 mg/dL (21 Dec 2021 17:39)      Medications:  MEDICATIONS  (STANDING):  carbidopa/levodopa 25/100 Disintegrating Tablet 1 Tablet(s) Oral every 8 hours  cephalexin 500 milliGRAM(s) Oral every 12 hours  enoxaparin Injectable 30 milliGRAM(s) SubCutaneous every 24 hours  insulin lispro (ADMELOG) corrective regimen sliding scale   SubCutaneous every 6 hours  levothyroxine 75 MICROGram(s) Oral every 24 hours  lisinopril 10 milliGRAM(s) Oral every 24 hours  metoprolol tartrate 25 milliGRAM(s) Oral every 12 hours  senna 2 Tablet(s) Oral at bedtime  valACYclovir 1000 milliGRAM(s) Oral every 12 hours  valproate sodium IVPB 250 milliGRAM(s) IV Intermittent every 8 hours    MEDICATIONS  (PRN):  OLANZapine Injectable 2.5 milliGRAM(s) IntraMuscular every 8 hours PRN agitation      Weight: 45.4kg   >>do not believe this is entirely accurate   Weight Change: No new weights recorded    Nutrition Focused Physical Exam: Completed [  X ]  Not Pertinent [   ]    Estimated energy needs: Height 62"; ABW 45.4kg; IBW 49.8kg; %IBW; BMI 18.3  IBW used to estimate needs based on clinical judgement, suspect current dosing weight may not be correct. Needs estimated for age and adjusted for sepsis, infection, malnutrition  Calories: 25-30 kcal/kg = 3914-0203 kcal/day  Protein: 1.2-1.4 g/kg = 60-70g pro/day  Fluids: 30-35 ml/kg = 3783-0138 ml/day    Subjective: 70F HTN, T2DM (a1c 6.0), asthma, hypothyroidism, seizure disorder, Parkinson's disease, hypothyroidism, schizophrenia transferred [facility] the ED from [facility] for altered mental status. Initially admitted to ICU for AMS, hypothermia, and hypotension requiring levophed likely 2/2 UTI vs polypharmacy. s/p pressors with NGT placed for nutritional support and stepped down to [facility], now stable for RMF. Continues to be medically optimized prior to returning to [facility]. Pt seen by SLP on 12/20 and started on puree PO diet. Pt seen in room, awake, alert, pleasant. No supplemental O2. She endorsed that her appetite is "so/so", amenable to oral nutrition supplementation (was previously receiving on [facility]). Upgraded to easy to chew texture after visit- may encouraged more PO. No N/V, but had diarrhea (noted ordered for miralax/senna, and oral abx). Discussed including yogurt and bananas in diet. No complaints of pain. Afebrile. POC BG 90, 106mg/dL. Will continue to follow per RD protocol.     Previous Nutrition Diagnosis:  Moderate malnutrition in the context of acute illness RT suspected inadequate protein-energy intake to meet nutritional needs AEB 21.8% weight loss x <1 month, mild muscle wasting.   Active [ X  ]  Resolved [   ]    If resolved, new PES:     Goal: Pt will consistently meet >60% of daily EER w/good tolerance, no further s/s malnutrition     Recommendations:  1. Recommend Glucerna Shakes BID (440 kcal, 20g protein, 400mL H2O) and banatrol BID   2. Encourage PO intake   3. Monitor lytes and replete prn. POC BG q6hrs   4. Pain regimen per team   *leticia FLOR.    Education: discussed importance of PO intake, oral nutrition supplementation     Risk Level: High [ X  ] Moderate [   ] Low [   ]

## 2021-12-22 NOTE — BH CONSULTATION LIAISON PROGRESS NOTE - NSBHCHARTREVIEWLAB_PSY_A_CORE FT
11.9   6.21  )-----------( 138      ( 20 Dec 2021 07:14 )             39.0     12-20    145  |  110<H>  |  22  ----------------------------<  144<H>  4.5   |  24  |  0.71    Ca    10.1      20 Dec 2021 07:14  Phos  4.7     12-20  Mg     2.1     12-20    TPro  7.8  /  Alb  3.7  /  TBili  0.2  /  DBili  x   /  AST  46<H>  /  ALT  <5<L>  /  AlkPhos  119  12-19  
                      11.9   6.21  )-----------( 138      ( 20 Dec 2021 07:14 )             39.0     12-20    145  |  110<H>  |  22  ----------------------------<  144<H>  4.5   |  24  |  0.71    Ca    10.1      20 Dec 2021 07:14  Phos  4.7     12-20  Mg     2.1     12-20    TPro  7.8  /  Alb  3.7  /  TBili  0.2  /  DBili  x   /  AST  46<H>  /  ALT  <5<L>  /  AlkPhos  119  12-19  
                      10.9   4.08  )-----------( 140      ( 16 Dec 2021 21:03 )             35.8     12-17    147<H>  |  119<H>  |  15  ----------------------------<  69<L>  5.0   |  19<L>  |  0.85    Ca    9.0      17 Dec 2021 15:10    TPro  7.0  /  Alb  3.2<L>  /  TBili  <0.2  /  DBili  x   /  AST  37  /  ALT  <5<L>  /  AlkPhos  87  12-16    CK WNL  VPA 12/16 43.8
no new labs

## 2021-12-22 NOTE — BH CONSULTATION LIAISON PROGRESS NOTE - NSBHFUPINTERVALHXFT_PSY_A_CORE
Psychiatry f/u for management of delirium in pt transferred from [facility], with recent shabbir/psychosis/agitation. Interim hx reviewed - pt with episodes of vomiting yesterday, for TOV today. No PRN medications required for agitation. Standing antipsychotics held.    On interview, pt found asleep, easily arousable, calm, oriented to self and hospital, disoriented to specific location ("[facility]), date ("I'd prefer not to say") and situation. Pt reports her mood to be "alright" and denies specific c/o such as low mood, irritability, impaired, sleep, AVH, SI/HI. She c/o noise from beeping machines in her room and repeatedly stated that she is concerned about her hygiene. States that she has been having diarrhea, no further vomiting. Also asks about getting to eat. Declined to answer additional questions about recent hospital course/history, stating "why are you asking me that?"

## 2021-12-22 NOTE — PROGRESS NOTE ADULT - PROBLEM SELECTOR PLAN 3
possibly 2/2 constipation vs lack of movement following MICU stay. Olvera placed 12/20  - dc olvera, TOV  - OOB to chair  - encourage ambulation as tolerated

## 2021-12-22 NOTE — PROGRESS NOTE ADULT - PROBLEM SELECTOR PLAN 1
likely 2/2 UTI vs polypharmacy  - f/u psych recs  - switch CTX to Keflex 500mg BID for 7 day course (12/17/21 - 12/23/21)

## 2021-12-22 NOTE — PHARMACY COMMUNICATION NOTE - COMMENTS
Medication Reconciliation conducted pt nursing home medication via information from long term care pharmacy.    Medications patient were taking prior to hospital admission are as follow:     1.	benztropine 1mg, 2T BID  2.	sinemet 25-100mg, 1T TID  3.	clopidogrel 75mg, 1QD  4.	divalproex 125mg cap, 6caps HS  5.	levothyroxine 75mcg, 1QD  6.	lisinopril 10mg, 1 QD  7.	metoprolol Tar 25mg, 1 BID  8.	Myrbetriq 50mg, 1tab AM  9.	trazodone 50mg, 1 HS  10.	cranberry 400mg cap, 1cap BID  11.	fluphenazine 25mg, 1 BID  12.	hydroxyzine 10mg, 1 TID  13.	multivitamin, 1 QD  14.	gabapentin 100mg, 1 TID  15.	senna 8.6, 2T HS    Provider informed and  medication list in "Outpatient Medication Review" updated.      Please contact [phone] if you have any questions regarding this Med Rec.

## 2021-12-22 NOTE — PROGRESS NOTE ADULT - SUBJECTIVE AND OBJECTIVE BOX
OVERNIGHT EVENTS: MARK    SUBJECTIVE / INTERVAL HPI: Patient seen and examined at bedside. Pt c/o diarrhea. Denies abd pain, fevers, chills.     VITAL SIGNS:  Vital Signs Last 24 Hrs  T(C): 36.7 (22 Dec 2021 10:00), Max: 36.9 (22 Dec 2021 06:00)  T(F): 98 (22 Dec 2021 10:00), Max: 98.4 (22 Dec 2021 06:00)  HR: 69 (22 Dec 2021 10:00) (69 - 88)  BP: 156/82 (22 Dec 2021 10:00) (118/73 - 156/82)  BP(mean): --  RR: 19 (22 Dec 2021 10:00) (18 - 19)  SpO2: 97% (22 Dec 2021 10:00) (96% - 99%)    PHYSICAL EXAM:    General: disheveled; frail  HEENT: NC/AT; anisocoria (R dilated, L constricted) conjunctiva and sclera clear, anicteric sclera; MMM  Neck: supple  Cardiovascular: +S1/S2; RRR  Respiratory: CTA B/L; no W/R/R  Gastrointestinal: soft, NT/ND; +BSx4  Extremities: WWP; no edema, clubbing or cyanosis  Vascular: 2+ radial, DP/PT pulses B/L  Neurological: AAOx3; no focal deficits    MEDICATIONS:  MEDICATIONS  (STANDING):  carbidopa/levodopa 25/100 Disintegrating Tablet 1 Tablet(s) Oral every 8 hours  cephalexin 500 milliGRAM(s) Oral every 12 hours  enoxaparin Injectable 30 milliGRAM(s) SubCutaneous every 24 hours  insulin lispro (ADMELOG) corrective regimen sliding scale   SubCutaneous every 6 hours  levothyroxine 75 MICROGram(s) Oral every 24 hours  lisinopril 10 milliGRAM(s) Oral every 24 hours  metoprolol tartrate 25 milliGRAM(s) Oral every 12 hours  senna 2 Tablet(s) Oral at bedtime  valACYclovir 1000 milliGRAM(s) Oral every 12 hours  valproate sodium IVPB 250 milliGRAM(s) IV Intermittent every 8 hours    MEDICATIONS  (PRN):  OLANZapine Injectable 2.5 milliGRAM(s) IntraMuscular every 8 hours PRN agitation      ALLERGIES:  Allergies    penicillin (Hives; Rash)    Intolerances        LABS:              CAPILLARY BLOOD GLUCOSE      POCT Blood Glucose.: 185 mg/dL (22 Dec 2021 13:20)      RADIOLOGY & ADDITIONAL TESTS: Reviewed.    ASSESSMENT:    PLAN:

## 2021-12-22 NOTE — BH CONSULTATION LIAISON PROGRESS NOTE - OTHER
moving extremities, not cooperative with physical exam no tremor appreciate on limited exam, pt not cooperative with full assessment stable posture lying back in bed more coherent than prior, periods of impaired reasoning not overtly responding to internal stimuli. Denied AVH fair, guarded impoverished. Suspiciousness/Mild paranoia about reasons for interview, no clear voiced delusions. No SI or HI

## 2021-12-22 NOTE — BH CONSULTATION LIAISON PROGRESS NOTE - NSBHASSESSMENTFT_PSY_ALL_CORE
69yo woman with a history of schizophrenia/schizoaffective d/o and Parkinson's dementia who presents with slowly resolving hypoactive delirium following transfer from [facility] as RRT for AMS with minimal responsiveness, found to have a UTI. Unclear if any other acute contributing factors; observed to have adverse response with oversedation to lorazepam while on [facility], brief introduction of clozapine last week unlikely to cause sx. Recommend holding standing antipsychotics and benzodiazepines given hypoactive delirium without agitation or distressing psychosis--pt appears to be doing well with any antipsychotic. Will monitor with likely plan to resume olanzapine for treatment of psychosis and prior shabbir as delirium resolves given high risk for agitation and delusional thinking. If remains in behavioral control, will not need additional inpatient psychiatric treatment.    DDx delirium, schizophrenia/schizoaffective d/o, neurocognitive d/o    RECOMMENDATIONS  -enhanced supervision, low threshold to resume 1:1  -hold standing antipsychotics for now in setting of slowly resolving delirium  -hold benzodiazepines given prior adverse response with oversedation  -olanzapine 2.5mg po/IM q8H PRN for acute agitation that is not redirectable  -delirium precautions  -will follow and re-evaluate for disposition - potentially back to SNF given psychiatric improvement

## 2021-12-23 ENCOUNTER — TRANSCRIPTION ENCOUNTER (OUTPATIENT)
Age: 71
End: 2021-12-23

## 2021-12-23 VITALS
SYSTOLIC BLOOD PRESSURE: 152 MMHG | HEART RATE: 87 BPM | OXYGEN SATURATION: 97 % | DIASTOLIC BLOOD PRESSURE: 77 MMHG | TEMPERATURE: 98 F | RESPIRATION RATE: 20 BRPM

## 2021-12-23 LAB
GLUCOSE BLDC GLUCOMTR-MCNC: 140 MG/DL — HIGH (ref 70–99)
GLUCOSE BLDC GLUCOMTR-MCNC: 204 MG/DL — HIGH (ref 70–99)
GLUCOSE BLDC GLUCOMTR-MCNC: 66 MG/DL — LOW (ref 70–99)
GLUCOSE BLDC GLUCOMTR-MCNC: 86 MG/DL — SIGNIFICANT CHANGE UP (ref 70–99)
GLUCOSE BLDC GLUCOMTR-MCNC: 96 MG/DL — SIGNIFICANT CHANGE UP (ref 70–99)
SARS-COV-2 RNA SPEC QL NAA+PROBE: NEGATIVE — SIGNIFICANT CHANGE UP

## 2021-12-23 PROCEDURE — 80164 ASSAY DIPROPYLACETIC ACD TOT: CPT

## 2021-12-23 PROCEDURE — 36415 COLL VENOUS BLD VENIPUNCTURE: CPT

## 2021-12-23 PROCEDURE — 93005 ELECTROCARDIOGRAM TRACING: CPT

## 2021-12-23 PROCEDURE — 85730 THROMBOPLASTIN TIME PARTIAL: CPT

## 2021-12-23 PROCEDURE — 81001 URINALYSIS AUTO W/SCOPE: CPT

## 2021-12-23 PROCEDURE — 83735 ASSAY OF MAGNESIUM: CPT

## 2021-12-23 PROCEDURE — 92526 ORAL FUNCTION THERAPY: CPT

## 2021-12-23 PROCEDURE — 99232 SBSQ HOSP IP/OBS MODERATE 35: CPT

## 2021-12-23 PROCEDURE — 96375 TX/PRO/DX INJ NEW DRUG ADDON: CPT

## 2021-12-23 PROCEDURE — 87086 URINE CULTURE/COLONY COUNT: CPT

## 2021-12-23 PROCEDURE — 84132 ASSAY OF SERUM POTASSIUM: CPT

## 2021-12-23 PROCEDURE — 82803 BLOOD GASES ANY COMBINATION: CPT

## 2021-12-23 PROCEDURE — 87040 BLOOD CULTURE FOR BACTERIA: CPT

## 2021-12-23 PROCEDURE — 92610 EVALUATE SWALLOWING FUNCTION: CPT

## 2021-12-23 PROCEDURE — 82330 ASSAY OF CALCIUM: CPT

## 2021-12-23 PROCEDURE — 99239 HOSP IP/OBS DSCHRG MGMT >30: CPT | Mod: GC

## 2021-12-23 PROCEDURE — 95700 EEG CONT REC W/VID EEG TECH: CPT

## 2021-12-23 PROCEDURE — 83605 ASSAY OF LACTIC ACID: CPT

## 2021-12-23 PROCEDURE — 84295 ASSAY OF SERUM SODIUM: CPT

## 2021-12-23 PROCEDURE — 84484 ASSAY OF TROPONIN QUANT: CPT

## 2021-12-23 PROCEDURE — 82553 CREATINE MB FRACTION: CPT

## 2021-12-23 PROCEDURE — 96376 TX/PRO/DX INJ SAME DRUG ADON: CPT

## 2021-12-23 PROCEDURE — 71045 X-RAY EXAM CHEST 1 VIEW: CPT

## 2021-12-23 PROCEDURE — 97161 PT EVAL LOW COMPLEX 20 MIN: CPT

## 2021-12-23 PROCEDURE — U0003: CPT

## 2021-12-23 PROCEDURE — U0005: CPT

## 2021-12-23 PROCEDURE — 80048 BASIC METABOLIC PNL TOTAL CA: CPT

## 2021-12-23 PROCEDURE — 82962 GLUCOSE BLOOD TEST: CPT

## 2021-12-23 PROCEDURE — 84100 ASSAY OF PHOSPHORUS: CPT

## 2021-12-23 PROCEDURE — 87635 SARS-COV-2 COVID-19 AMP PRB: CPT

## 2021-12-23 PROCEDURE — 85610 PROTHROMBIN TIME: CPT

## 2021-12-23 PROCEDURE — 99285 EMERGENCY DEPT VISIT HI MDM: CPT

## 2021-12-23 PROCEDURE — 96374 THER/PROPH/DIAG INJ IV PUSH: CPT

## 2021-12-23 PROCEDURE — 97116 GAIT TRAINING THERAPY: CPT

## 2021-12-23 PROCEDURE — 84443 ASSAY THYROID STIM HORMONE: CPT

## 2021-12-23 PROCEDURE — 80307 DRUG TEST PRSMV CHEM ANLYZR: CPT

## 2021-12-23 PROCEDURE — 70496 CT ANGIOGRAPHY HEAD: CPT | Mod: MC

## 2021-12-23 PROCEDURE — 70450 CT HEAD/BRAIN W/O DYE: CPT | Mod: MC

## 2021-12-23 PROCEDURE — 80053 COMPREHEN METABOLIC PANEL: CPT

## 2021-12-23 PROCEDURE — 95714 VEEG EA 12-26 HR UNMNTR: CPT

## 2021-12-23 PROCEDURE — 85025 COMPLETE CBC W/AUTO DIFF WBC: CPT

## 2021-12-23 PROCEDURE — 87186 SC STD MICRODIL/AGAR DIL: CPT

## 2021-12-23 PROCEDURE — 70498 CT ANGIOGRAPHY NECK: CPT | Mod: MC

## 2021-12-23 PROCEDURE — 82550 ASSAY OF CK (CPK): CPT

## 2021-12-23 PROCEDURE — 85027 COMPLETE CBC AUTOMATED: CPT

## 2021-12-23 RX ORDER — FLUPHENAZINE HYDROCHLORIDE 1 MG/1
1 TABLET, FILM COATED ORAL
Qty: 0 | Refills: 0 | DISCHARGE

## 2021-12-23 RX ORDER — GABAPENTIN 400 MG/1
1 CAPSULE ORAL
Qty: 0 | Refills: 0 | DISCHARGE

## 2021-12-23 RX ORDER — CLOPIDOGREL BISULFATE 75 MG/1
1 TABLET, FILM COATED ORAL
Qty: 0 | Refills: 0 | DISCHARGE

## 2021-12-23 RX ORDER — MIRABEGRON 50 MG/1
1 TABLET, EXTENDED RELEASE ORAL
Qty: 0 | Refills: 0 | DISCHARGE

## 2021-12-23 RX ORDER — DIVALPROEX SODIUM 500 MG/1
6 TABLET, DELAYED RELEASE ORAL
Qty: 0 | Refills: 0 | DISCHARGE

## 2021-12-23 RX ORDER — BENZTROPINE MESYLATE 1 MG
2 TABLET ORAL
Qty: 0 | Refills: 0 | DISCHARGE

## 2021-12-23 RX ORDER — CEPHALEXIN 500 MG
1 CAPSULE ORAL
Qty: 6 | Refills: 0
Start: 2021-12-23 | End: 2021-12-25

## 2021-12-23 RX ORDER — HYDROXYZINE HCL 10 MG
1 TABLET ORAL
Qty: 0 | Refills: 0 | DISCHARGE

## 2021-12-23 RX ORDER — TRAZODONE HCL 50 MG
1 TABLET ORAL
Qty: 0 | Refills: 0 | DISCHARGE

## 2021-12-23 RX ADMIN — Medication 26.25 MILLIGRAM(S): at 07:44

## 2021-12-23 RX ADMIN — VALACYCLOVIR 1000 MILLIGRAM(S): 500 TABLET, FILM COATED ORAL at 12:14

## 2021-12-23 RX ADMIN — Medication 500 MILLIGRAM(S): at 06:54

## 2021-12-23 RX ADMIN — CARBIDOPA AND LEVODOPA 1 TABLET(S): 25; 100 TABLET ORAL at 06:53

## 2021-12-23 RX ADMIN — Medication 26.25 MILLIGRAM(S): at 13:31

## 2021-12-23 RX ADMIN — Medication 4: at 13:28

## 2021-12-23 RX ADMIN — Medication 75 MICROGRAM(S): at 06:54

## 2021-12-23 RX ADMIN — CARBIDOPA AND LEVODOPA 1 TABLET(S): 25; 100 TABLET ORAL at 14:53

## 2021-12-23 RX ADMIN — Medication 25 MILLIGRAM(S): at 07:03

## 2021-12-23 RX ADMIN — VALACYCLOVIR 1000 MILLIGRAM(S): 500 TABLET, FILM COATED ORAL at 01:22

## 2021-12-23 RX ADMIN — LISINOPRIL 10 MILLIGRAM(S): 2.5 TABLET ORAL at 06:53

## 2021-12-23 NOTE — BH CONSULTATION LIAISON PROGRESS NOTE - NSCDBILL_PSY_A_CORE
Time based billing
Time based billing
[id number] - Inpatient, moderate complexity
Time based billing
[id number] - Inpatient, moderate complexity
Time based billing

## 2021-12-23 NOTE — PROGRESS NOTE ADULT - PROBLEM SELECTOR PROBLEM 3
Hypothermia not due to cold exposure
Hypothermia not due to cold exposure
Urinary retention

## 2021-12-23 NOTE — BH CONSULTATION LIAISON PROGRESS NOTE - NSBHASSESSMENTFT_PSY_ALL_CORE
69yo woman with a history of schizophrenia/schizoaffective d/o and Parkinson's dementia who presents with much improved hypoactive delirium following transfer from [facility] as RRT for AMS with minimal responsiveness, found to have a UTI. Pt is much better related, more attentive and better oriented over the last several days, with stable affect, grossly organized thinking and no significant ongoing paranoia/delusional thinking or agitation, accepting of medical care. She is possibly experiencing visual hallucinations but they are not causing significant distress or disturbance in behavior. Given resolving hypoactive delirium and h/o Parkinson's dementia without current acute decompensated psychosis or shabbir would continue to hold standing antipsychotics to minimize risk of adverse response, oversedation. Pt will need close outpt f/u to assess psychotropic regimen given h/o significant psychosis, recent shabbir and agitation (which may have been exacerbated by underlying infection). In discussion with inpt psychiatry, pt not anticipated to benefit from additional inpatient psychiatric care at this time and given physical debility, would be better served by longitudinal psychiatric care at SNF.     Risk assessment: Low acute risk for suicide or aggression/violence. Pt is future oriented, treatment seeking, without any SI or recent aggressive behaviors, with multiple protective factors including her family, plan for return to supervised SNF setting. Acute risk factors include recent infection with delirium addressed by medical treatment in hospital. chronic risk factors including dx of dementia and psychotic d/o mitigated by treatment while on inpt psychiatry, engagement in outpt psychiatric care.    DDx resolving delirium superimposed on dementia, schizophrenia/schizoaffective d/o, neurocognitive d/o 2/2 Parkinsons by history    RECOMMENDATIONS  -enhanced supervision for cognitive impairment, delirium  -continue to hold standing antipsychotics given resolving delirium and absence of acute indication - to be reassessed as outpatient. On VPA as AED also likely contributing to mood stabilization.  -hold benzodiazepines given prior adverse response with oversedation  -olanzapine 2.5mg po/IM q8H PRN for acute agitation that is not redirectable  -delirium precautions  -pt is psychiatrically stable for return to SNF. Will remain available as needed

## 2021-12-23 NOTE — BH CONSULTATION LIAISON PROGRESS NOTE - CURRENT MEDICATION
MEDICATIONS  (STANDING):  carbidopa/levodopa 25/100 Disintegrating Tablet 1 Tablet(s) Oral every 8 hours  cefTRIAXone   IVPB 2000 milliGRAM(s) IV Intermittent every 24 hours  metoprolol tartrate 25 milliGRAM(s) Oral every 12 hours  valproate sodium IVPB 185 milliGRAM(s) IV Intermittent every 6 hours    MEDICATIONS  (PRN):  
MEDICATIONS  (STANDING):  carbidopa/levodopa 25/100 Disintegrating Tablet 1 Tablet(s) Oral every 8 hours  cefTRIAXone   IVPB 2000 milliGRAM(s) IV Intermittent every 24 hours  enoxaparin Injectable 30 milliGRAM(s) SubCutaneous every 24 hours  insulin lispro (ADMELOG) corrective regimen sliding scale   SubCutaneous every 6 hours  levothyroxine 75 MICROGram(s) Oral every 24 hours  lisinopril 10 milliGRAM(s) Oral every 24 hours  metoprolol tartrate 25 milliGRAM(s) Oral every 12 hours  valACYclovir 1000 milliGRAM(s) Oral every 12 hours  valproate sodium IVPB 250 milliGRAM(s) IV Intermittent every 8 hours    MEDICATIONS  (PRN):  OLANZapine Injectable 2.5 milliGRAM(s) IntraMuscular every 8 hours PRN agitation  
MEDICATIONS  (STANDING):  carbidopa/levodopa 25/100 Disintegrating Tablet 1 Tablet(s) Oral every 8 hours  cefTRIAXone   IVPB 2000 milliGRAM(s) IV Intermittent every 24 hours  chlorhexidine 2% Cloths 1 Application(s) Topical <User Schedule>  sodium chloride 0.9% Bolus 500 milliLiter(s) IV Bolus once  valproate sodium IVPB 185 milliGRAM(s) IV Intermittent every 6 hours    MEDICATIONS  (PRN):  
MEDICATIONS  (STANDING):  carbidopa/levodopa 25/100 Disintegrating Tablet 1 Tablet(s) Oral every 8 hours  cefTRIAXone   IVPB 1000 milliGRAM(s) IV Intermittent every 24 hours  enoxaparin Injectable 30 milliGRAM(s) SubCutaneous every 24 hours  insulin lispro (ADMELOG) corrective regimen sliding scale   SubCutaneous every 6 hours  levothyroxine 75 MICROGram(s) Oral every 24 hours  lisinopril 10 milliGRAM(s) Oral every 24 hours  metoprolol tartrate 25 milliGRAM(s) Oral every 12 hours  polyethylene glycol 3350 17 Gram(s) Oral every 12 hours  senna 2 Tablet(s) Oral at bedtime  valACYclovir 1000 milliGRAM(s) Oral every 12 hours  valproate sodium IVPB 250 milliGRAM(s) IV Intermittent every 8 hours    MEDICATIONS  (PRN):  OLANZapine Injectable 2.5 milliGRAM(s) IntraMuscular every 8 hours PRN agitation  
MEDICATIONS  (STANDING):  carbidopa/levodopa 25/100 Disintegrating Tablet 1 Tablet(s) Oral every 8 hours  cephalexin 500 milliGRAM(s) Oral every 12 hours  enoxaparin Injectable 30 milliGRAM(s) SubCutaneous every 24 hours  insulin lispro (ADMELOG) corrective regimen sliding scale   SubCutaneous every 6 hours  levothyroxine 75 MICROGram(s) Oral every 24 hours  lisinopril 10 milliGRAM(s) Oral every 24 hours  metoprolol tartrate 25 milliGRAM(s) Oral every 12 hours  senna 2 Tablet(s) Oral at bedtime  valACYclovir 1000 milliGRAM(s) Oral every 12 hours  valproate sodium IVPB 250 milliGRAM(s) IV Intermittent every 8 hours    MEDICATIONS  (PRN):  OLANZapine Injectable 2.5 milliGRAM(s) IntraMuscular every 8 hours PRN agitation  
MEDICATIONS  (STANDING):  carbidopa/levodopa 25/100 Disintegrating Tablet 1 Tablet(s) Oral every 8 hours  cephalexin 500 milliGRAM(s) Oral every 12 hours  enoxaparin Injectable 30 milliGRAM(s) SubCutaneous every 24 hours  insulin lispro (ADMELOG) corrective regimen sliding scale   SubCutaneous every 6 hours  levothyroxine 75 MICROGram(s) Oral every 24 hours  lisinopril 10 milliGRAM(s) Oral every 24 hours  metoprolol tartrate 25 milliGRAM(s) Oral every 12 hours  polyethylene glycol 3350 17 Gram(s) Oral every 12 hours  senna 2 Tablet(s) Oral at bedtime  valACYclovir 1000 milliGRAM(s) Oral every 12 hours  valproate sodium IVPB 250 milliGRAM(s) IV Intermittent every 8 hours    MEDICATIONS  (PRN):  OLANZapine Injectable 2.5 milliGRAM(s) IntraMuscular every 8 hours PRN agitation

## 2021-12-23 NOTE — BH CONSULTATION LIAISON PROGRESS NOTE - NSBHMSESPABN_PSY_A_CORE
Soft volume/Decreased productivity
Soft volume/Decreased productivity
Soft volume/Decreased productivity/Increased latency
Soft volume/Decreased productivity/Increased latency/Other

## 2021-12-23 NOTE — BH CONSULTATION LIAISON PROGRESS NOTE - NSBHFUPINTERVALHXFT_PSY_A_CORE
Psychiatry f/u for management of delirium in pt transferred from [facility], h/o schizoaffective d/o and dementia with recent shabbir, psychosis, agitation. Interim hx reviewed. Pt adherent with care, no reported agitation, no PRN medication required for agitation. Discharge planning for potential return to SNF.    On interview, pt awake, alert, calm, pleasant, oriented to self, date, "[facility]", minimally to situation. Pt voiced concern about seeing a bug on her wall and preference for the prior room she was in. She voiced recall of prior experience of AH but denied current; states that she has heard reassuring messages from God in the past. She denied current low mood, anxiety, racing thoughts, SI or HI. She voiced agreement about discharge from the hospital but asked repeatedly about where she would go, unable to recall her living situation prior to discharge. No other c/o voiced. Discussed importance of f/u with psychiatrist on dc.

## 2021-12-23 NOTE — PROGRESS NOTE ADULT - PROBLEM SELECTOR PLAN 10
- continue to monitor
- continue to monitor
Has hx of asthma. Pt protecting airway and saturating WNL.  - continue to monitor

## 2021-12-23 NOTE — BH CONSULTATION LIAISON PROGRESS NOTE - NSBHCHARTREVIEWVS_PSY_A_CORE FT
Vital Signs Last 24 Hrs  T(C): 36.4 (21 Dec 2021 05:18), Max: 36.7 (20 Dec 2021 14:30)  T(F): 97.5 (21 Dec 2021 05:18), Max: 98 (20 Dec 2021 14:30)  HR: 88 (21 Dec 2021 05:18) (73 - 95)  BP: 146/83 (21 Dec 2021 05:18) (146/83 - 175/96)  BP(mean): --  RR: 17 (21 Dec 2021 05:18) (17 - 19)  SpO2: 99% (21 Dec 2021 05:18) (99% - 100%)
Vital Signs Last 24 Hrs  T(C): 35.3 (19 Dec 2021 09:05), Max: 37 (18 Dec 2021 18:35)  T(F): 95.5 (19 Dec 2021 09:05), Max: 98.6 (18 Dec 2021 18:35)  HR: 88 (19 Dec 2021 08:15) (83 - 103)  BP: 165/81 (19 Dec 2021 08:15) (100/60 - 165/81)  BP(mean): 115 (19 Dec 2021 08:15) (74 - 115)  RR: 18 (19 Dec 2021 08:15) (10 - 19)  SpO2: 100% (19 Dec 2021 08:15) (98% - 100%)
Vital Signs Last 24 Hrs  T(C): 35.7 (18 Dec 2021 14:00), Max: 37.8 (18 Dec 2021 00:57)  T(F): 96.3 (18 Dec 2021 14:00), Max: 100 (18 Dec 2021 00:57)  HR: 94 (18 Dec 2021 14:00) (89 - 117)  BP: 145/84 (18 Dec 2021 14:00) (90/54 - 148/82)  BP(mean): 107 (18 Dec 2021 14:00) (64 - 107)  RR: 19 (18 Dec 2021 14:00) (10 - 19)  SpO2: 100% (18 Dec 2021 14:00) (97% - 100%)
Vital Signs Last 24 Hrs  T(C): 36.9 (22 Dec 2021 06:00), Max: 36.9 (22 Dec 2021 06:00)  T(F): 98.4 (22 Dec 2021 06:00), Max: 98.4 (22 Dec 2021 06:00)  HR: 82 (22 Dec 2021 06:00) (72 - 88)  BP: 150/77 (22 Dec 2021 06:00) (118/73 - 161/92)  BP(mean): --  RR: 19 (22 Dec 2021 06:00) (18 - 19)  SpO2: 96% (22 Dec 2021 06:00) (96% - 99%)
Vital Signs Last 24 Hrs  T(C): 36.7 (23 Dec 2021 09:39), Max: 37.2 (22 Dec 2021 21:30)  T(F): 98.1 (23 Dec 2021 09:39), Max: 98.9 (22 Dec 2021 21:30)  HR: 70 (23 Dec 2021 09:39) (70 - 84)  BP: 152/87 (23 Dec 2021 09:39) (120/70 - 174/94)  BP(mean): --  RR: 18 (23 Dec 2021 09:39) (18 - 18)  SpO2: 99% (23 Dec 2021 09:39) (94% - 99%)
Vital Signs Last 24 Hrs  T(C): 36.4 (20 Dec 2021 06:02), Max: 36.4 (19 Dec 2021 15:00)  T(F): 97.5 (20 Dec 2021 06:02), Max: 97.5 (19 Dec 2021 15:00)  HR: 87 (20 Dec 2021 07:30) (78 - 89)  BP: 165/98 (20 Dec 2021 07:30) (157/89 - 180/103)  BP(mean): 117 (19 Dec 2021 11:43) (117 - 117)  RR: 20 (20 Dec 2021 06:02) (18 - 20)  SpO2: 98% (20 Dec 2021 06:02) (98% - 100%)

## 2021-12-23 NOTE — BH CONSULTATION LIAISON PROGRESS NOTE - NSBHMSEMOOD_PSY_A_CORE
Normal/Unable to assess
Unable to assess
Normal/Unable to assess
Normal/Unable to assess
Irritable/Unable to assess

## 2021-12-23 NOTE — BH CONSULTATION LIAISON PROGRESS NOTE - NSBHADMITCOUNSELOTHER_PSY_A_CORE FT
psychoeducation about reason for hospital care, transfer from psychiatric unit
support and education about admission provided to pt
support and education about recent hospitalization, plans for outpt psychiatric care

## 2021-12-23 NOTE — BH CONSULTATION LIAISON PROGRESS NOTE - NSBHMSEMOVE_PSY_A_CORE
No abnormal movements
No abnormal movements/Other
No abnormal movements/Other
n/a
Unable to assess
No abnormal movements/Other

## 2021-12-23 NOTE — BH CONSULTATION LIAISON PROGRESS NOTE - NSBHADMITCOORDCAREOTHER_PSY_A_CORE FT
coordination with inpt psychiatry re: management and disposition
coordination re disposition planning
primary team and inpt psychiatry re management and disposition

## 2021-12-23 NOTE — PROGRESS NOTE ADULT - ATTENDING COMMENTS
Patient seen and examined at bedside. Agree with exam, a/p as above with the following addendum/edits:     70 year old F w/ PMH of asthma, hypothyroidism, seizures, Parkinson's, schizophrenia initially admitted to 8U then moved to MICU for hypotension 2/2 polypharmacy vs UTI, now weaned off pressors and stepped down to RMF, on ceftriaxone. Pulled out NGT overnight, pending s/s, passed bedside dysphagia. Pending psych recs.
Patient seen and examined with house-staff during bedside rounds.  Resident note read, including vitals, physical findings, laboratory data, and radiological reports.   Revisions included below.  Direct personal management at bed side and extensive interpretation of the data.  Plan was outlined and discussed in details with the housestaff.  Decision making of high complexity  Action taken for acute disease activity to reflect the level of care provided:  - medication reconciliation  - review laboratory data  Patient clinically stable she is more awake and communicating.  The patient not in any respiratory distress.  No seizure activity on the EEG.  Continue current meds.  Patient hemodynamically stable.  On antibiotic for possible aspiration pneumonia.  The patient is medically stable to transfer to the floor.  She is an NG tube feed.  Dysphagia evaluation tomorrow
Patient seen and examined with house-staff during bedside rounds.  Resident note read, including vitals, physical findings, laboratory data, and radiological reports.   Revisions included below.  Direct personal management at bed side and extensive interpretation of the data.  Plan was outlined and discussed in details with the housestaff.  Decision making of high complexity  Action taken for acute disease activity to reflect the level of care provided:  - medication reconciliation  - review laboratory data  Toxic metabolic encephalopathy most likely related to seizure disorder and overmedication.  Await the reports on the EEG with the patient is status epilepticus or she has slowing of her EEG.  Continue valproic acid.  Continue antiparkinson meds.  The sepsis resolved and was most likely related to UTI.  Continue ceftriaxone for total of 7 days.  Patient is still not awake to to be fed.  NG tube and starting diet.  The renal function is stable.  Monitor sodium which is a high end.  Repeat labs today
Klebsiella UTI : continue ceftriaxone, blood cultures NGTD  EEG results: negative for epileptiform activity - continuing valproate 250mg q8h for seizure disorder  Psych reccs reviewed, holding off other psych meds, continue olanzapine 2.5mg IM q8h PRN agiation;     continue NGT, S&S assessment tomorrow
Patient seen and examined at bedside. Agree with exam, a/p as above with the following addendum/edits:     70 year old F w/ PMH of asthma, hypothyroidism, seizures, Parkinson's, schizophrenia initially admitted to 8U then moved to MICU for hypotension 2/2 polypharmacy vs UTI, now weaned off pressors and stepped down to RMF, on ceftriaxone initially now keflex for UTI. Pulled out NGT but passed s/s. PT until no longer [facility] recommendation or when psych clears her but appears she will need further psych optimization. Retaining urine so olvera placed, c/w mobilization, pending TOV, OOBTC. Ambulate. Her mental status has improved over the last several days and she is more verbal and oriented.
Patient was seen and examined at bedside on 12/23/2021 at 9 am. She has no acute complaints. Denies SOB, CP. ROS is otherwise negative. Vitals, labwork and pertinent imaging reviewed. Physical exam as documented above.     Plan - pending d/c to [facility]
Patient seen and examined at bedside. Agree with exam, a/p as above with the following addendum/edits:     70 year old F w/ PMH of asthma, hypothyroidism, seizures, Parkinson's, schizophrenia initially admitted to 8U then moved to MICU for hypotension 2/2 polypharmacy vs UTI, now weaned off pressors and stepped down to RMF, on ceftriaxone. Pulled out NGT but passed s/s. PT until no longer [facility] recommendation or when psych clears her but appears she will need further psych optimization. Retaining urine so olvera placed, c/w mobilization, will do TOV tomorrow. OOBTC. Ambulate.

## 2021-12-23 NOTE — BH CONSULTATION LIAISON PROGRESS NOTE - NSBHMSETHTPROC_PSY_A_CORE
Impaired reasoning/Other
Impaired reasoning/Other
Illogical/Impaired reasoning
Unable to assess
Disorganized/Illogical/Impaired reasoning

## 2021-12-23 NOTE — BH CONSULTATION LIAISON PROGRESS NOTE - GENERAL APPEARANCE
No deformities present
10-Oct-2017
No deformities present
No deformities present

## 2021-12-23 NOTE — BH CONSULTATION LIAISON PROGRESS NOTE - NSBHADMITCOUNSEL_PSY_A_CORE
risk factor reduction/client/family/caregiver education
importance of adherence to chosen treatment/risk factor reduction/other...
client/family/caregiver education/other...
risk factor reduction/client/family/caregiver education/other...

## 2021-12-23 NOTE — DISCHARGE NOTE NURSING/CASE MANAGEMENT/SOCIAL WORK - NSDCPEFALRISK_GEN_ALL_CORE
For information on Fall & Injury Prevention, visit: [email or web address] OR  [email or web address] OR  https://www.cdc.gov/steadi/patient.html

## 2021-12-23 NOTE — BH CONSULTATION LIAISON PROGRESS NOTE - NSBHMSESPEECH_PSY_A_CORE
Non-verbal: unable to assess speech further
Abnormal as indicated, otherwise normal...

## 2021-12-23 NOTE — BH CONSULTATION LIAISON PROGRESS NOTE - NSBHROSSYSTEMS_PSY_ALL_CORE
Constitutional Symptoms.../Psychiatric
Psychiatric
Gastrointestinal.../Psychiatric
Psychiatric

## 2021-12-23 NOTE — PROGRESS NOTE ADULT - SUBJECTIVE AND OBJECTIVE BOX
OVERNIGHT EVENTS: MARK    SUBJECTIVE / INTERVAL HPI: Patient seen and examined at bedside. Pt is more awake. Says she feels alright. Denies fevers, chills.     VITAL SIGNS:  Vital Signs Last 24 Hrs  T(C): 36.7 (23 Dec 2021 09:39), Max: 37.2 (22 Dec 2021 21:30)  T(F): 98.1 (23 Dec 2021 09:39), Max: 98.9 (22 Dec 2021 21:30)  HR: 70 (23 Dec 2021 09:39) (70 - 84)  BP: 152/87 (23 Dec 2021 09:39) (120/70 - 174/94)  BP(mean): --  RR: 18 (23 Dec 2021 09:39) (18 - 18)  SpO2: 99% (23 Dec 2021 09:39) (94% - 99%)    PHYSICAL EXAM:    General: disheveled; frail  HEENT: NC/AT; anisocoria (R dilated, L constricted) conjunctiva and sclera clear, anicteric sclera; MMM  Neck: supple  Cardiovascular: +S1/S2; RRR  Respiratory: CTA B/L; no W/R/R  Gastrointestinal: soft, NT/ND; +BSx4  Extremities: WWP; no edema, clubbing or cyanosis  Vascular: 2+ radial, DP/PT pulses B/L  Neurological: AAOx3; no focal deficits    MEDICATIONS:  MEDICATIONS  (STANDING):  carbidopa/levodopa 25/100 Disintegrating Tablet 1 Tablet(s) Oral every 8 hours  cephalexin 500 milliGRAM(s) Oral every 12 hours  enoxaparin Injectable 30 milliGRAM(s) SubCutaneous every 24 hours  insulin lispro (ADMELOG) corrective regimen sliding scale   SubCutaneous every 6 hours  levothyroxine 75 MICROGram(s) Oral every 24 hours  lisinopril 10 milliGRAM(s) Oral every 24 hours  metoprolol tartrate 25 milliGRAM(s) Oral every 12 hours  senna 2 Tablet(s) Oral at bedtime  valACYclovir 1000 milliGRAM(s) Oral every 12 hours  valproate sodium IVPB 250 milliGRAM(s) IV Intermittent every 8 hours    MEDICATIONS  (PRN):  OLANZapine Injectable 2.5 milliGRAM(s) IntraMuscular every 8 hours PRN agitation      ALLERGIES:  Allergies    penicillin (Hives; Rash)    Intolerances        LABS:              CAPILLARY BLOOD GLUCOSE      POCT Blood Glucose.: 204 mg/dL (23 Dec 2021 13:20)      RADIOLOGY & ADDITIONAL TESTS: Reviewed.    ASSESSMENT:    PLAN:

## 2021-12-23 NOTE — PROGRESS NOTE ADULT - PROBLEM SELECTOR PROBLEM 8
Hypothermia not due to cold exposure
Seizure disorder
Seizure disorder

## 2021-12-23 NOTE — PROGRESS NOTE ADULT - NUTRITIONAL ASSESSMENT
This patient has been assessed with a concern for Malnutrition and has been determined to have a diagnosis/diagnoses of Moderate protein-calorie malnutrition and Underweight (BMI < 19).    This patient is being managed with:   Diet Pureed-  Entered: Dec 20 2021 12:50PM    
This patient has been assessed with a concern for Malnutrition and has been determined to have a diagnosis/diagnoses of Moderate protein-calorie malnutrition and Underweight (BMI < 19).    This patient is being managed with:   Diet Easy to Chew-  Banatrol TF     Qty per Day:  2  Supplement Feeding Modality:  Oral  Glucerna Shake Cans or Servings Per Day:  1       Frequency:  Two Times a day  Entered: Dec 22 2021  1:30PM    Diet Easy to Chew-  Entered: Dec 22 2021 11:39AM    The following pending diet order is being considered for treatment of Moderate protein-calorie malnutrition and Underweight (BMI < 19):null
This patient has been assessed with a concern for Malnutrition and has been determined to have a diagnosis/diagnoses of Moderate protein-calorie malnutrition and Underweight (BMI < 19).    This patient is being managed with:   Diet Easy to Chew-  Entered: Dec 22 2021 11:39AM    
This patient has been assessed with a concern for Malnutrition and has been determined to have a diagnosis/diagnoses of Moderate protein-calorie malnutrition and Underweight (BMI < 19).    This patient is being managed with:   Diet NPO-  Entered: Dec 17 2021  2:36AM    
This patient has been assessed with a concern for Malnutrition and has been determined to have a diagnosis/diagnoses of Moderate protein-calorie malnutrition and Underweight (BMI < 19).    This patient is being managed with:   Diet NPO-  Entered: Dec 17 2021  2:36AM    
This patient has been assessed with a concern for Malnutrition and has been determined to have a diagnosis/diagnoses of Moderate protein-calorie malnutrition and Underweight (BMI < 19).    This patient is being managed with:   Diet NPO-  Except Medications  Entered: Dec 20 2021  5:43AM    
This patient has been assessed with a concern for Malnutrition and has been determined to have a diagnosis/diagnoses of Moderate protein-calorie malnutrition and Underweight (BMI < 19).    This patient is being managed with:   Diet NPO-  Entered: Dec 17 2021  2:36AM    
This patient has been assessed with a concern for Malnutrition and has been determined to have a diagnosis/diagnoses of Moderate protein-calorie malnutrition and Underweight (BMI < 19).    This patient is being managed with:   Diet NPO-  Entered: Dec 17 2021  2:36AM    
This patient has been assessed with a concern for Malnutrition and has been determined to have a diagnosis/diagnoses of Moderate protein-calorie malnutrition and Underweight (BMI < 19).    This patient is being managed with:   Diet NPO with Tube Feed-  Tube Feeding Modality: Nasogastric  Glucerna 1.2 Zain (GLUCERNARTH)  Continuous  Starting Tube Feed Rate {mL per Hour}: 10  Increase Tube Feed Rate by (mL): 10     Every 4 hours  Until Goal Tube Feed Rate (mL per Hour): 50  Tube Feed Duration (in Hours): 24  Tube Feed Start Time: 13:00  Entered: Dec 19 2021 12:57PM

## 2021-12-23 NOTE — DISCHARGE NOTE NURSING/CASE MANAGEMENT/SOCIAL WORK - PATIENT PORTAL LINK FT
You can access the FollowMyHealth Patient Portal offered by [facility] by registering at the following website: [email or web address]. By joining [facility]’s FollowMyHealth portal, you will also be able to view your health information using other applications (apps) compatible with our system.

## 2021-12-23 NOTE — BH CONSULTATION LIAISON PROGRESS NOTE - NSICDXBHSECONDARYDX_PSY_ALL_CORE
Chronic schizophrenia   F20.9  Parkinson disease   G20  
Chronic schizophrenia   F20.9  Parkinson disease   G20  
Chronic schizophrenia   F20.9  
Chronic schizophrenia   F20.9  Parkinson disease   G20  
Chronic schizophrenia   F20.9  Parkinson disease   G20

## 2021-12-23 NOTE — PROGRESS NOTE ADULT - PROBLEM SELECTOR PLAN 8
- c/w Depacon 250mg IV q8 until pt can tolerate PO
- c/w Depacon 250mg IV q8 until pt can tolerate PO
Resolved. likely 2/2 sepsis vs polypharmacy  - continue to monitor

## 2021-12-23 NOTE — PROGRESS NOTE ADULT - PROBLEM SELECTOR PLAN 12
F: PO  E: replete PRN  N: pureed  DVT ppx: lovenox  GI PPx: None    FULL CODE    Dispo: [facility]
F: PO  E: replete PRN  N: pureed  DVT ppx: lovenox  GI PPx: None    FULL CODE    Dispo: [facility]
F: PO  E: replete PRN  N: pureed  DVT ppx: lovenox  GI PPx: None    FULL CODE    Dispo: [facility]

## 2021-12-23 NOTE — BH CONSULTATION LIAISON PROGRESS NOTE - OTHER
possible recent visual hallucinations, not overtly responding to internal stimuli. No subjective current AH fair improved fluency and latency from prior more coherent than prior, periods of impaired reasoning appearing 2/2 impaired cognition impaired - improved from prior about recent hospitalization with infection moving extremities, not cooperative with physical exam impoverished. No significant paranoia, no clear voiced delusions. No SI or HI stable posture lying back in bed no tremor appreciate on limited exam, pt not cooperative with full assessment more attentive than prior, mildly impaired concentration impaired due to cognitive deficits

## 2021-12-23 NOTE — BH CONSULTATION LIAISON PROGRESS NOTE - NSBHPTASSESSDT_PSY_A_CORE
22-Dec-2021 09:26
19-Dec-2021 10:52
21-Dec-2021 09:43
23-Dec-2021 10:40
20-Dec-2021 09:57
18-Dec-2021 14:32

## 2021-12-23 NOTE — PROGRESS NOTE ADULT - PROBLEM SELECTOR PROBLEM 2
Chronic schizophrenia

## 2021-12-23 NOTE — PROGRESS NOTE ADULT - PROBLEM SELECTOR PLAN 7
- holding home lisinopril 10mg daily, restart as appropriate  - c/w metoprolol 25mg daily with hold parameters

## 2021-12-23 NOTE — BH CONSULTATION LIAISON PROGRESS NOTE - NSICDXBHPRIMARYDX_PSY_ALL_CORE
Delirium   R41.0  

## 2021-12-23 NOTE — PROGRESS NOTE ADULT - PROBLEM SELECTOR PLAN 2
Holding psychotropic medications at this time due to pt's AMS.  - f/u psych recs  - Zyprexa 2.5mg IM q8 PRN agitation

## 2021-12-23 NOTE — BH CONSULTATION LIAISON PROGRESS NOTE - NSBHCONSULTMEDAGITATION_PSY_A_CORE FT
olanzapine as above
olanzapine 2.5 mg po/im q 8hr 
olanzapine as above

## 2021-12-23 NOTE — PROGRESS NOTE ADULT - PROBLEM SELECTOR PROBLEM 11
History of shingles
History of shingles
Prophylactic measure
History of shingles
Prophylactic measure

## 2021-12-23 NOTE — BH CONSULTATION LIAISON PROGRESS NOTE - NSBHPSYCHOLCOGABN_PSY_A_CORE
disoriented to place/disoriented to situation
disoriented to time/disoriented to place/disoriented to situation
disoriented to time/disoriented to place/disoriented to situation
disoriented to time/disoriented to situation

## 2021-12-23 NOTE — BH CONSULTATION LIAISON PROGRESS NOTE - NS ED BHA REVIEW OF ED CHART AVAILABLE INVESTIGATIONS REVIEWED
Chief Complaint(s) and History of Present Illness(es)     Glaucoma Follow-Up     In both eyes.  Associated symptoms include Negative for eye pain, redness, tearing, trauma and dryness.  Pain was noted as 0/10.              Comments     5 month f/u for Juvenile Glaucoma. Pt notes for the most part vision seems about the same. No big changes. Pt would like a refraction today.     Ocular meds: None    JARAD Christianson 12:13 PM July 23, 2020                       
Yes

## 2021-12-23 NOTE — BH CONSULTATION LIAISON PROGRESS NOTE - NSBHINDICATION_PSY_ALL_CORE
delirium, recent agitation and psychosis, elopement risk. Low threshold to resume 1:1 given recent aggressive behaviors on 8Uris
delirium, recent agitation and psychosis, elopement risk. Low threshold to resume 1:1 given h/o aggressive behaviors on 8Uris
disorientation 
delirium, recent agitation and psychosis, elopement risk. Low threshold to resume 1:1 given recent aggressive behaviors on 8Uris
delirium, cognitive impairment

## 2021-12-23 NOTE — BH CONSULTATION LIAISON PROGRESS NOTE - NSBHPSYCHOLCOGORIENT_PSY_A_CORE
Not fully oriented...
Unable to assess
Not fully oriented...

## 2021-12-23 NOTE — BH CONSULTATION LIAISON PROGRESS NOTE - NSBHCONSFOLLOWNEEDS_PSY_ALL_CORE
Needs further psych safety assessment prior to discharge
Needs further psych safety assessment prior to discharge
No psychiatric contraindications to discharge
Needs further psych safety assessment prior to discharge

## 2021-12-23 NOTE — PROGRESS NOTE ADULT - PROBLEM SELECTOR PLAN 6
- c/w home carbidopa/levodopa

## 2021-12-23 NOTE — BH CONSULTATION LIAISON PROGRESS NOTE - NSBHMSELANG_PSY_A_CORE
No abnormalities noted
Unable to assess
No abnormalities noted

## 2021-12-28 NOTE — BH PATIENT PROFILE - NSASFALLNEEDSASSISTWITH_GEN_A_NUR
Pls call pt and let her know that order provided was for both shoulder and hip.  Thank you.  walking/toileting

## 2022-01-01 NOTE — PROGRESS NOTE ADULT - ASSESSMENT
WBC 32 000   need to be repeated w a blood culture 69 yo F with PMH of HTN, T2DM, asthma, seizure disorder, Parkinson's disease, glaucoma, schizophrenia, hemorrhoids BIBEMS from [facility] for acute change in mental status i/s/o hypotension. Likely medication-induced given negative brain imaging and negative metabolic and infectious work up, now with decompensated schizophrenia. Waiting for acceptance by inpatient psych facility 69 yo F with PMH of HTN, T2DM, asthma, seizure disorder, Parkinson's disease, glaucoma, schizophrenia, hemorrhoids BIBEMS from [facility] for acute change in mental status i/s/o hypotension. Likely medication-induced given negative brain imaging and negative metabolic and infectious work up, now with decompensated schizophrenia. Waiting for acceptance by inpatient psych facility, going [facility] on Monday Dec 6 per [facility]

## 2022-01-03 DIAGNOSIS — Z88.0 ALLERGY STATUS TO PENICILLIN: ICD-10-CM

## 2022-01-03 DIAGNOSIS — G40.909 EPILEPSY, UNSPECIFIED, NOT INTRACTABLE, WITHOUT STATUS EPILEPTICUS: ICD-10-CM

## 2022-01-03 DIAGNOSIS — E11.9 TYPE 2 DIABETES MELLITUS WITHOUT COMPLICATIONS: ICD-10-CM

## 2022-01-03 DIAGNOSIS — E03.9 HYPOTHYROIDISM, UNSPECIFIED: ICD-10-CM

## 2022-01-03 DIAGNOSIS — E44.0 MODERATE PROTEIN-CALORIE MALNUTRITION: ICD-10-CM

## 2022-01-03 DIAGNOSIS — G92.8 OTHER TOXIC ENCEPHALOPATHY: ICD-10-CM

## 2022-01-03 DIAGNOSIS — N39.0 URINARY TRACT INFECTION, SITE NOT SPECIFIED: ICD-10-CM

## 2022-01-03 DIAGNOSIS — F20.9 SCHIZOPHRENIA, UNSPECIFIED: ICD-10-CM

## 2022-01-03 DIAGNOSIS — E87.5 HYPERKALEMIA: ICD-10-CM

## 2022-01-03 DIAGNOSIS — A41.9 SEPSIS, UNSPECIFIED ORGANISM: ICD-10-CM

## 2022-01-03 DIAGNOSIS — D69.6 THROMBOCYTOPENIA, UNSPECIFIED: ICD-10-CM

## 2022-01-03 DIAGNOSIS — T42.4X5A ADVERSE EFFECT OF BENZODIAZEPINES, INITIAL ENCOUNTER: ICD-10-CM

## 2022-01-03 DIAGNOSIS — B02.9 ZOSTER WITHOUT COMPLICATIONS: ICD-10-CM

## 2022-01-03 DIAGNOSIS — G20 PARKINSON'S DISEASE: ICD-10-CM

## 2022-01-03 DIAGNOSIS — J45.909 UNSPECIFIED ASTHMA, UNCOMPLICATED: ICD-10-CM

## 2022-01-03 DIAGNOSIS — T43.595A ADVERSE EFFECT OF OTHER ANTIPSYCHOTICS AND NEUROLEPTICS, INITIAL ENCOUNTER: ICD-10-CM

## 2022-01-03 DIAGNOSIS — I95.9 HYPOTENSION, UNSPECIFIED: ICD-10-CM

## 2022-01-03 DIAGNOSIS — B96.1 KLEBSIELLA PNEUMONIAE [K. PNEUMONIAE] AS THE CAUSE OF DISEASES CLASSIFIED ELSEWHERE: ICD-10-CM

## 2022-01-03 DIAGNOSIS — R33.9 RETENTION OF URINE, UNSPECIFIED: ICD-10-CM

## 2022-01-13 NOTE — DIETITIAN INITIAL EVALUATION ADULT. - REASON INDICATOR FOR ASSESSMENT
Extended length of stay >7 days
I have personally performed a face to face diagnostic evaluation on this patient. I have reviewed the ACP note and agree with the history, exam and plan of care, except as noted.

## 2022-03-17 ENCOUNTER — APPOINTMENT (OUTPATIENT)
Dept: SPEECH THERAPY | Facility: HOSPITAL | Age: 72
End: 2022-03-17

## 2022-03-17 ENCOUNTER — APPOINTMENT (OUTPATIENT)
Dept: RADIOLOGY | Facility: HOSPITAL | Age: 72
End: 2022-03-17

## 2022-05-31 ENCOUNTER — APPOINTMENT (OUTPATIENT)
Dept: SPEECH THERAPY | Facility: HOSPITAL | Age: 72
End: 2022-05-31
Payer: MEDICARE

## 2022-05-31 ENCOUNTER — APPOINTMENT (OUTPATIENT)
Dept: RADIOLOGY | Facility: HOSPITAL | Age: 72
End: 2022-05-31
Payer: MEDICARE

## 2022-05-31 ENCOUNTER — OUTPATIENT (OUTPATIENT)
Dept: OUTPATIENT SERVICES | Facility: HOSPITAL | Age: 72
LOS: 1 days | End: 2022-05-31

## 2022-05-31 ENCOUNTER — OUTPATIENT (OUTPATIENT)
Dept: OUTPATIENT SERVICES | Facility: HOSPITAL | Age: 72
LOS: 1 days | Discharge: HOME | End: 2022-05-31

## 2022-05-31 DIAGNOSIS — R13.10 DYSPHAGIA, UNSPECIFIED: ICD-10-CM

## 2022-05-31 PROCEDURE — 74230 X-RAY XM SWLNG FUNCJ C+: CPT | Mod: 26

## 2022-06-07 DIAGNOSIS — R13.12 DYSPHAGIA, OROPHARYNGEAL PHASE: ICD-10-CM

## 2022-06-23 NOTE — BH CONSULTATION LIAISON ASSESSMENT NOTE - HOMICIDALITY / AGGRESSION (CURRENT/PAST)
Yes Niacinamide Counseling: I recommended taking niacin or niacinamide, also know as vitamin B3, twice daily. Recent evidence suggests that taking vitamin B3 (500 mg twice daily) can reduce the risk of actinic keratoses and non-melanoma skin cancers. Side effects of vitamin B3 include flushing and headache.

## 2022-06-30 NOTE — BH CONSULTATION LIAISON ASSESSMENT NOTE - NSBHCONSULTMEDPRN_PSY_A_CORE
Samm Melendez is a 10 month old child presenting for assessment of No chief complaint on file. Pharmacy of choice noted (under Meds & Orders): Yes  Medications and allergies verified: Yes  Denies known Latex allergy or symptoms of Latex sensitivity. IMMUNIZATION REACTION: no  Patient would like communication of their results via:        Cell Phone:   Telephone Information:   Mobile [phone] to leave a message containing results? Yes  CONCERNS: yes, fever of 100 and pulling at both ears. Also has some congestion. No fever present today. There are no preventive care reminders to display for this patient. Patient is up to date, no discussion needed. yes

## 2022-07-13 NOTE — BH INPATIENT PSYCHIATRY ASSESSMENT NOTE - NSBHMSEIMPULSE_PSY_A_CORE
[FreeTextEntry1] : 69 year old man with ETOH, recently admitted to  for sepsis secondary to  UTI, with abnormal imaging here for follow up. \par \par Patient with recent EGD with Dr. Cazares, his private GI doctor. Told him to follow up with him to compare CT scan to recent endoscopy report, and defer to Dr. Cazares whether repeat scoping is indicated.  Impaired

## 2022-07-20 NOTE — STROKE CODE NOTE - NIH STROKE SCALE: 2. BEST GAZE, QM
At bedside with Dr. Francisco Prather for TAYLOR, stool (+) OB.      Clotilde Berman RN  07/20/22 6598
BLE dressings noted on arrival. Dressings dry and intact.      Frank Zheng RN  07/20/22 3774
Call placed to ICU, aware documenting RN ready for report.      Ophelia Mercedes RN  07/20/22 1990
Call placed to [facility], spoke with Minor Allan RN. Aware of admission and diagnosis. Karyn to fax over patient paperwork.      Dortha Leyden, RN  07/20/22 0415
Charge RN, [facility] aware protonix gtt infusing and second IV line needed for transfusion order. Patient also needs repeat troponin.      Erica Garcia, SHERITA  07/20/22 7294
Dr. Seth Blanco at bedside, daughter present. Informed of Hgb level. Patient and daughter consent for blood transfusion.      Juju Holly RN  07/20/22 1355
Lab called with critical HGB of 4.9. Dr. Addy Miguel and Verónica MAGALLON notified.       August Bernal RN  07/20/22 4058
Report completed with ICU RN. Patient transported out of department.       Vidal Barone RN  07/20/22 8330
(0) Normal

## 2022-07-27 NOTE — PHYSICAL THERAPY INITIAL EVALUATION ADULT - BALANCE DISTURBANCE, SYSTEM IMPAIRMENT CONTRIBUTE, REHAB EVAL
[de-identified] : Drea is doing well 1 month after her sleeve.  She has not had any more nosebleeds since stopping her Lovenox.  No SOB or calf pain.  Tolerating diet, taking vitamins, asking if she can take a pill for Calcium  because she does not like the chews. No GERD. COnstipation improved since taking stool softeners. She vomited once at night after gulping water.  neuromuscular/musculoskeletal

## 2022-08-12 NOTE — BH PATIENT PROFILE - NSPROEDAABILITYLEARN_GEN_A_NUR
Assessment:    PCM monthly follow-up call.  Ally underwent a banding procedure in esophagus today, she is not in pain following the procedure, she will be back to her baseline energy level by the end of the day.  She has been busy since we last talked.  She had cataract surgery on both eyes.  Vision has dramatically improved.  Left eye was 20/80 & now 20/25.  Right eye will be tested on Monday.  She is sad about her PCP Dr. Randhawa leaving. Trying to get patient scheduled with new female MD PCP.  Scheduled her with Dr. Herrera @ [facility].  Has lost weight but A1c has increased to 8.8 from 6.6.  Has appointment with Nutritional Counseling @ [facility] on 9/27/22.  Educational material I sent her via [employer] was helpful.      Education:    No updates at this time    Care Plan:    Continue with plan of care    Progress:    Progressing    Next Outreach: 9/12/22 @ 1600   cognitive limitations

## 2022-10-21 NOTE — BH TREATMENT PLAN - NSTXDCFAMDATETRGT_PSY_ALL_CORE
----- Message from Irene Angelo sent at 10/21/2022 11:16 AM CDT -----  Contact: Patient  Type:  Needs Medical Advice    Who Called:  Patient     Would the patient rather a call back or a response via [facility]?  Call     Best Call Back Number: [phone] (home)      Additional Information:  Patient would like to speak with a nurse. Patient stated that he had a colonoscopy on Tuesday and wants to discuss something about that.     Please call to advise          22-Nov-2021

## 2022-12-08 NOTE — BH CONSULTATION LIAISON ASSESSMENT NOTE - NSBHMSEAFFRANGE_PSY_A_CORE
Medical Necessity Information: It is in your best interest to select a reason for this procedure from the list below. All of these items fulfill various CMS LCD requirements except lesion extends to a margin. Include Z78.9 (Other Specified Conditions Influencing Health Status) As An Associated Diagnosis?: Yes Medical Necessity Clause: This procedure was medically necessary because the lesion that was treated was: Lab: [id number] Body Location Override (Optional - Billing Will Still Be Based On Selected Body Map Location If Applicable): left lateral chest wall Surgeon (Optional): john Size Of Lesion In Cm: 1.1 X Size Of Lesion In Cm (Optional): 1.2 Size Of Margin In Cm: 0.1 Anesthesia Volume In Cc: 3 Was An Eye Clamp Used?: No Eye Clamp Note Details: An eye clamp was used during the procedure. Excision Method: Elliptical Saucerization Depth: dermis and superficial adipose tissue Repair Type: Complex Suturegard Retention Suture: 2-0 Nylon Retention Suture Bite Size: 3 mm Length To Time In Minutes Device Was In Place: 10 Number Of Hemigard Strips Per Side: 1 Intermediate / Complex Repair - Final Wound Length In Cm: 1.4 Undermining Type: Entire Wound Debridement Text: The wound edges were debrided prior to proceeding with the closure to facilitate wound healing. Helical Rim Text: The closure involved the helical rim. Vermilion Border Text: The closure involved the vermilion border. Nostril Rim Text: The closure involved the nostril rim. Retention Suture Text: Retention sutures were placed to support the closure and prevent dehiscence. Primary Defect Length (In Cm): 0 Suture Removal: 14 days Epidermal Closure Graft Donor Site (Optional): simple interrupted Graft Donor Site Bandage (Optional-Leave Blank If You Don't Want In Note): Steri-strips and a pressure bandage were applied to the donor site. Detail Level: Detailed Excision Depth: adipose tissue Scalpel Size: 15 blade Anesthesia Type: 1% lidocaine with epinephrine Additional Anesthesia Volume In Cc: 6 Hemostasis: Electrocautery Estimated Blood Loss (Cc): minimal Additional Deep Sutures: 4-0 Nylon Epidermal Closure: running Wound Care: Bacitracin Dressing: pressure dressing Suturegard Intro: Intraoperative tissue expansion was performed, utilizing the SUTUREGARD device, in order to reduce wound tension. Suturegard Body: The suture ends were repeatedly re-tightened and re-clamped to achieve the desired tissue expansion. Unable to assess Hemigard Intro: Due to skin fragility and wound tension, it was decided to use HEMIGARD adhesive retention suture devices to permit a linear closure. The skin was cleaned and dried for a 6cm distance away from the wound. Excessive hair, if present, was removed to allow for adhesion. Hemigard Postcare Instructions: The HEMIGARD strips are to remain completely dry for at least 5-7 days. Positioning (Leave Blank If You Do Not Want): The patient was placed in a comfortable position exposing the surgical site. Complex Repair Preamble Text (Leave Blank If You Do Not Want): Extensive wide undermining was performed. Intermediate Repair Preamble Text (Leave Blank If You Do Not Want): Undermining was performed with blunt dissection. Fusiform Excision Additional Text (Leave Blank If You Do Not Want): The margin was drawn around the clinically apparent lesion. A fusiform shape was then drawn on the skin incorporating the lesion and margins. Incisions were then made along these lines to the appropriate tissue plane and the lesion was extirpated. Eliptical Excision Additional Text (Leave Blank If You Do Not Want): The margin was drawn around the clinically apparent lesion. An elliptical shape was then drawn on the skin incorporating the lesion and margins. Incisions were then made along these lines to the appropriate tissue plane and the lesion was extirpated. Saucerization Excision Additional Text (Leave Blank If You Do Not Want): The margin was drawn around the clinically apparent lesion. Incisions were then made along these lines, in a tangential fashion, to the appropriate tissue plane and the lesion was extirpated. Slit Excision Additional Text (Leave Blank If You Do Not Want): A linear line was drawn on the skin overlying the lesion. An incision was made slowly until the lesion was visualized. Once visualized, the lesion was removed with blunt dissection. Excisional Biopsy Additional Text (Leave Blank If You Do Not Want): The margin was drawn around the clinically apparent lesion. An elliptical shape was then drawn on the skin incorporating the lesion and margins.  Incisions were then made along these lines to the appropriate tissue plane and the lesion was extirpated. Perilesional Excision Additional Text (Leave Blank If You Do Not Want): The margin was drawn around the clinically apparent lesion. Incisions were then made along these lines to the appropriate tissue plane and the lesion was extirpated. Repair Performed By Another Provider Text (Leave Blank If You Do Not Want): After the tissue was excised the defect was repaired by another provider. No Repair - Repaired With Adjacent Surgical Defect Text (Leave Blank If You Do Not Want): After the excision the defect was repaired concurrently with another surgical defect which was in close approximation. Adjacent Tissue Transfer Text: The defect edges were debeveled with a #15 scalpel blade. Given the location of the defect and the proximity to free margins an adjacent tissue transfer was deemed most appropriate. Using a sterile surgical marker, an appropriate flap was drawn incorporating the defect and placing the expected incisions within the relaxed skin tension lines where possible. The area thus outlined was incised deep to adipose tissue with a #15 scalpel blade. The skin margins were undermined to an appropriate distance in all directions utilizing iris scissors. Advancement Flap (Single) Text: The defect edges were debeveled with a #15 scalpel blade. Given the location of the defect and the proximity to free margins a single advancement flap was deemed most appropriate. Using a sterile surgical marker, an appropriate advancement flap was drawn incorporating the defect and placing the expected incisions within the relaxed skin tension lines where possible. The area thus outlined was incised deep to adipose tissue with a #15 scalpel blade. The skin margins were undermined to an appropriate distance in all directions utilizing iris scissors. Advancement Flap (Double) Text: The defect edges were debeveled with a #15 scalpel blade. Given the location of the defect and the proximity to free margins a double advancement flap was deemed most appropriate. Using a sterile surgical marker, the appropriate advancement flaps were drawn incorporating the defect and placing the expected incisions within the relaxed skin tension lines where possible. The area thus outlined was incised deep to adipose tissue with a #15 scalpel blade. The skin margins were undermined to an appropriate distance in all directions utilizing iris scissors. Burow's Advancement Flap Text: The defect edges were debeveled with a #15 scalpel blade. Given the location of the defect and the proximity to free margins a Burow's advancement flap was deemed most appropriate. Using a sterile surgical marker, the appropriate advancement flap was drawn incorporating the defect and placing the expected incisions within the relaxed skin tension lines where possible. The area thus outlined was incised deep to adipose tissue with a #15 scalpel blade. The skin margins were undermined to an appropriate distance in all directions utilizing iris scissors. Chonodrocutaneous Helical Advancement Flap Text: The defect edges were debeveled with a #15 scalpel blade. Given the location of the defect and the proximity to free margins a chondrocutaneous helical advancement flap was deemed most appropriate. Using a sterile surgical marker, the appropriate advancement flap was drawn incorporating the defect and placing the expected incisions within the relaxed skin tension lines where possible. The area thus outlined was incised deep to adipose tissue with a #15 scalpel blade. The skin margins were undermined to an appropriate distance in all directions utilizing iris scissors. Crescentic Advancement Flap Text: The defect edges were debeveled with a #15 scalpel blade. Given the location of the defect and the proximity to free margins a crescentic advancement flap was deemed most appropriate. Using a sterile surgical marker, the appropriate advancement flap was drawn incorporating the defect and placing the expected incisions within the relaxed skin tension lines where possible. The area thus outlined was incised deep to adipose tissue with a #15 scalpel blade. The skin margins were undermined to an appropriate distance in all directions utilizing iris scissors. A-T Advancement Flap Text: The defect edges were debeveled with a #15 scalpel blade. Given the location of the defect, shape of the defect and the proximity to free margins an A-T advancement flap was deemed most appropriate. Using a sterile surgical marker, an appropriate advancement flap was drawn incorporating the defect and placing the expected incisions within the relaxed skin tension lines where possible. The area thus outlined was incised deep to adipose tissue with a #15 scalpel blade. The skin margins were undermined to an appropriate distance in all directions utilizing iris scissors. O-T Advancement Flap Text: The defect edges were debeveled with a #15 scalpel blade. Given the location of the defect, shape of the defect and the proximity to free margins an O-T advancement flap was deemed most appropriate. Using a sterile surgical marker, an appropriate advancement flap was drawn incorporating the defect and placing the expected incisions within the relaxed skin tension lines where possible. The area thus outlined was incised deep to adipose tissue with a #15 scalpel blade. The skin margins were undermined to an appropriate distance in all directions utilizing iris scissors. O-L Flap Text: The defect edges were debeveled with a #15 scalpel blade. Given the location of the defect, shape of the defect and the proximity to free margins an O-L flap was deemed most appropriate. Using a sterile surgical marker, an appropriate advancement flap was drawn incorporating the defect and placing the expected incisions within the relaxed skin tension lines where possible. The area thus outlined was incised deep to adipose tissue with a #15 scalpel blade. The skin margins were undermined to an appropriate distance in all directions utilizing iris scissors. O-Z Flap Text: The defect edges were debeveled with a #15 scalpel blade. Given the location of the defect, shape of the defect and the proximity to free margins an O-Z flap was deemed most appropriate. Using a sterile surgical marker, an appropriate transposition flap was drawn incorporating the defect and placing the expected incisions within the relaxed skin tension lines where possible. The area thus outlined was incised deep to adipose tissue with a #15 scalpel blade. The skin margins were undermined to an appropriate distance in all directions utilizing iris scissors. Double O-Z Flap Text: The defect edges were debeveled with a #15 scalpel blade. Given the location of the defect, shape of the defect and the proximity to free margins a Double O-Z flap was deemed most appropriate. Using a sterile surgical marker, an appropriate transposition flap was drawn incorporating the defect and placing the expected incisions within the relaxed skin tension lines where possible. The area thus outlined was incised deep to adipose tissue with a #15 scalpel blade. The skin margins were undermined to an appropriate distance in all directions utilizing iris scissors. V-Y Flap Text: The defect edges were debeveled with a #15 scalpel blade. Given the location of the defect, shape of the defect and the proximity to free margins a V-Y flap was deemed most appropriate. Using a sterile surgical marker, an appropriate advancement flap was drawn incorporating the defect and placing the expected incisions within the relaxed skin tension lines where possible. The area thus outlined was incised deep to adipose tissue with a #15 scalpel blade. The skin margins were undermined to an appropriate distance in all directions utilizing iris scissors. Advancement-Rotation Flap Text: The defect edges were debeveled with a #15 scalpel blade. Given the location of the defect, shape of the defect and the proximity to free margins an advancement-rotation flap was deemed most appropriate. Using a sterile surgical marker, an appropriate flap was drawn incorporating the defect and placing the expected incisions within the relaxed skin tension lines where possible. The area thus outlined was incised deep to adipose tissue with a #15 scalpel blade. The skin margins were undermined to an appropriate distance in all directions utilizing iris scissors. Mercedes Flap Text: The defect edges were debeveled with a #15 scalpel blade. Given the location of the defect, shape of the defect and the proximity to free margins a Mercedes flap was deemed most appropriate. Using a sterile surgical marker, an appropriate advancement flap was drawn incorporating the defect and placing the expected incisions within the relaxed skin tension lines where possible. The area thus outlined was incised deep to adipose tissue with a #15 scalpel blade. The skin margins were undermined to an appropriate distance in all directions utilizing iris scissors. Modified Advancement Flap Text: The defect edges were debeveled with a #15 scalpel blade. Given the location of the defect, shape of the defect and the proximity to free margins a modified advancement flap was deemed most appropriate. Using a sterile surgical marker, an appropriate advancement flap was drawn incorporating the defect and placing the expected incisions within the relaxed skin tension lines where possible. The area thus outlined was incised deep to adipose tissue with a #15 scalpel blade. The skin margins were undermined to an appropriate distance in all directions utilizing iris scissors. Mucosal Advancement Flap Text: Given the location of the defect, shape of the defect and the proximity to free margins a mucosal advancement flap was deemed most appropriate. Incisions were made with a 15 blade scalpel in the appropriate fashion along the cutaneous vermilion border and the mucosal lip. The remaining actinically damaged mucosal tissue was excised. The mucosal advancement flap was then elevated to the gingival sulcus with care taken to preserve the neurovascular structures and advanced into the primary defect. Care was taken to ensure that precise realignment of the vermilion border was achieved. Peng Advancement Flap Text: The defect edges were debeveled with a #15 scalpel blade. Given the location of the defect, shape of the defect and the proximity to free margins a Peng advancement flap was deemed most appropriate. Using a sterile surgical marker, an appropriate advancement flap was drawn incorporating the defect and placing the expected incisions within the relaxed skin tension lines where possible. The area thus outlined was incised deep to adipose tissue with a #15 scalpel blade. The skin margins were undermined to an appropriate distance in all directions utilizing iris scissors. Hatchet Flap Text: The defect edges were debeveled with a #15 scalpel blade. Given the location of the defect, shape of the defect and the proximity to free margins a hatchet flap was deemed most appropriate. Using a sterile surgical marker, an appropriate hatchet flap was drawn incorporating the defect and placing the expected incisions within the relaxed skin tension lines where possible. The area thus outlined was incised deep to adipose tissue with a #15 scalpel blade. The skin margins were undermined to an appropriate distance in all directions utilizing iris scissors. Rotation Flap Text: The defect edges were debeveled with a #15 scalpel blade. Given the location of the defect, shape of the defect and the proximity to free margins a rotation flap was deemed most appropriate. Using a sterile surgical marker, an appropriate rotation flap was drawn incorporating the defect and placing the expected incisions within the relaxed skin tension lines where possible. The area thus outlined was incised deep to adipose tissue with a #15 scalpel blade. The skin margins were undermined to an appropriate distance in all directions utilizing iris scissors. Spiral Flap Text: The defect edges were debeveled with a #15 scalpel blade. Given the location of the defect, shape of the defect and the proximity to free margins a spiral flap was deemed most appropriate. Using a sterile surgical marker, an appropriate rotation flap was drawn incorporating the defect and placing the expected incisions within the relaxed skin tension lines where possible. The area thus outlined was incised deep to adipose tissue with a #15 scalpel blade. The skin margins were undermined to an appropriate distance in all directions utilizing iris scissors. Staged Advancement Flap Text: The defect edges were debeveled with a #15 scalpel blade. Given the location of the defect, shape of the defect and the proximity to free margins a staged advancement flap was deemed most appropriate. Using a sterile surgical marker, an appropriate advancement flap was drawn incorporating the defect and placing the expected incisions within the relaxed skin tension lines where possible. The area thus outlined was incised deep to adipose tissue with a #15 scalpel blade. The skin margins were undermined to an appropriate distance in all directions utilizing iris scissors. Star Wedge Flap Text: The defect edges were debeveled with a #15 scalpel blade. Given the location of the defect, shape of the defect and the proximity to free margins a star wedge flap was deemed most appropriate. Using a sterile surgical marker, an appropriate rotation flap was drawn incorporating the defect and placing the expected incisions within the relaxed skin tension lines where possible. The area thus outlined was incised deep to adipose tissue with a #15 scalpel blade. The skin margins were undermined to an appropriate distance in all directions utilizing iris scissors. Transposition Flap Text: The defect edges were debeveled with a #15 scalpel blade. Given the location of the defect and the proximity to free margins a transposition flap was deemed most appropriate. Using a sterile surgical marker, an appropriate transposition flap was drawn incorporating the defect. The area thus outlined was incised deep to adipose tissue with a #15 scalpel blade. The skin margins were undermined to an appropriate distance in all directions utilizing iris scissors. Muscle Hinge Flap Text: The defect edges were debeveled with a #15 scalpel blade. Given the size, depth and location of the defect and the proximity to free margins a muscle hinge flap was deemed most appropriate. Using a sterile surgical marker, an appropriate hinge flap was drawn incorporating the defect. The area thus outlined was incised with a #15 scalpel blade. The skin margins were undermined to an appropriate distance in all directions utilizing iris scissors. Mustarde Flap Text: The defect edges were debeveled with a #15 scalpel blade. Given the size, depth and location of the defect and the proximity to free margins a Mustarde flap was deemed most appropriate. Using a sterile surgical marker, an appropriate flap was drawn incorporating the defect. The area thus outlined was incised with a #15 scalpel blade. The skin margins were undermined to an appropriate distance in all directions utilizing iris scissors. Nasal Turnover Hinge Flap Text: The defect edges were debeveled with a #15 scalpel blade. Given the size, depth, location of the defect and the defect being full thickness a nasal turnover hinge flap was deemed most appropriate. Using a sterile surgical marker, an appropriate hinge flap was drawn incorporating the defect. The area thus outlined was incised with a #15 scalpel blade. The flap was designed to recreate the nasal mucosal lining and the alar rim. The skin margins were undermined to an appropriate distance in all directions utilizing iris scissors. Nasalis-Muscle-Based Myocutaneous Island Pedicle Flap Text: Using a #15 blade, an incision was made around the donor flap to the level of the nasalis muscle. Wide lateral undermining was then performed in both the subcutaneous plane above the nasalis muscle, and in a submuscular plane just above periosteum. This allowed the formation of a free nasalis muscle axial pedicle (based on the angular artery) which was still attached to the actual cutaneous flap, increasing its mobility and vascular viability. Hemostasis was obtained with pinpoint electrocoagulation. The flap was mobilized into position and the pivotal anchor points positioned and stabilized with buried interrupted sutures. Subcutaneous and dermal tissues were closed in a multilayered fashion with sutures. Tissue redundancies were excised, and the epidermal edges were apposed without significant tension and sutured with sutures. Orbicularis Oris Muscle Flap Text: The defect edges were debeveled with a #15 scalpel blade. Given that the defect affected the competency of the oral sphincter an orbicularis oris muscle flap was deemed most appropriate to restore this competency and normal muscle function. Using a sterile surgical marker, an appropriate flap was drawn incorporating the defect. The area thus outlined was incised with a #15 scalpel blade. Melolabial Transposition Flap Text: The defect edges were debeveled with a #15 scalpel blade. Given the location of the defect and the proximity to free margins a melolabial flap was deemed most appropriate. Using a sterile surgical marker, an appropriate melolabial transposition flap was drawn incorporating the defect. The area thus outlined was incised deep to adipose tissue with a #15 scalpel blade. The skin margins were undermined to an appropriate distance in all directions utilizing iris scissors. Rhombic Flap Text: The defect edges were debeveled with a #15 scalpel blade. Given the location of the defect and the proximity to free margins a rhombic flap was deemed most appropriate. Using a sterile surgical marker, an appropriate rhombic flap was drawn incorporating the defect. The area thus outlined was incised deep to adipose tissue with a #15 scalpel blade. The skin margins were undermined to an appropriate distance in all directions utilizing iris scissors. Rhomboid Transposition Flap Text: The defect edges were debeveled with a #15 scalpel blade. Given the location of the defect and the proximity to free margins a rhomboid transposition flap was deemed most appropriate. Using a sterile surgical marker, an appropriate rhomboid flap was drawn incorporating the defect. The area thus outlined was incised deep to adipose tissue with a #15 scalpel blade. The skin margins were undermined to an appropriate distance in all directions utilizing iris scissors. Bi-Rhombic Flap Text: The defect edges were debeveled with a #15 scalpel blade. Given the location of the defect and the proximity to free margins a bi-rhombic flap was deemed most appropriate. Using a sterile surgical marker, an appropriate rhombic flap was drawn incorporating the defect. The area thus outlined was incised deep to adipose tissue with a #15 scalpel blade. The skin margins were undermined to an appropriate distance in all directions utilizing iris scissors. Helical Rim Advancement Flap Text: The defect edges were debeveled with a #15 blade scalpel. Given the location of the defect and the proximity to free margins (helical rim) a double helical rim advancement flap was deemed most appropriate. Using a sterile surgical marker, the appropriate advancement flaps were drawn incorporating the defect and placing the expected incisions between the helical rim and antihelix where possible. The area thus outlined was incised through and through with a #15 scalpel blade. With a skin hook and iris scissors, the flaps were gently and sharply undermined and freed up. Bilateral Helical Rim Advancement Flap Text: The defect edges were debeveled with a #15 blade scalpel. Given the location of the defect and the proximity to free margins (helical rim) a bilateral helical rim advancement flap was deemed most appropriate. Using a sterile surgical marker, the appropriate advancement flaps were drawn incorporating the defect and placing the expected incisions between the helical rim and antihelix where possible. The area thus outlined was incised through and through with a #15 scalpel blade. With a skin hook and iris scissors, the flaps were gently and sharply undermined and freed up. Ear Star Wedge Flap Text: The defect edges were debeveled with a #15 blade scalpel. Given the location of the defect and the proximity to free margins (helical rim) an ear star wedge flap was deemed most appropriate. Using a sterile surgical marker, the appropriate flap was drawn incorporating the defect and placing the expected incisions between the helical rim and antihelix where possible. The area thus outlined was incised through and through with a #15 scalpel blade. Banner Transposition Flap Text: The defect edges were debeveled with a #15 scalpel blade. Given the location of the defect and the proximity to free margins a Banner transposition flap was deemed most appropriate. Using a sterile surgical marker, an appropriate flap drawn around the defect. The area thus outlined was incised deep to adipose tissue with a #15 scalpel blade. The skin margins were undermined to an appropriate distance in all directions utilizing iris scissors. Bilobed Flap Text: The defect edges were debeveled with a #15 scalpel blade. Given the location of the defect and the proximity to free margins a bilobe flap was deemed most appropriate. Using a sterile surgical marker, an appropriate bilobe flap drawn around the defect. The area thus outlined was incised deep to adipose tissue with a #15 scalpel blade. The skin margins were undermined to an appropriate distance in all directions utilizing iris scissors. Bilobed Transposition Flap Text: The defect edges were debeveled with a #15 scalpel blade. Given the location of the defect and the proximity to free margins a bilobed transposition flap was deemed most appropriate. Using a sterile surgical marker, an appropriate bilobe flap drawn around the defect. The area thus outlined was incised deep to adipose tissue with a #15 scalpel blade. The skin margins were undermined to an appropriate distance in all directions utilizing iris scissors. Trilobed Flap Text: The defect edges were debeveled with a #15 scalpel blade. Given the location of the defect and the proximity to free margins a trilobed flap was deemed most appropriate. Using a sterile surgical marker, an appropriate trilobed flap drawn around the defect. The area thus outlined was incised deep to adipose tissue with a #15 scalpel blade. The skin margins were undermined to an appropriate distance in all directions utilizing iris scissors. Dorsal Nasal Flap Text: The defect edges were debeveled with a #15 scalpel blade. Given the location of the defect and the proximity to free margins a dorsal nasal flap was deemed most appropriate. Using a sterile surgical marker, an appropriate dorsal nasal flap was drawn around the defect. The area thus outlined was incised deep to adipose tissue with a #15 scalpel blade. The skin margins were undermined to an appropriate distance in all directions utilizing iris scissors. Island Pedicle Flap Text: The defect edges were debeveled with a #15 scalpel blade. Given the location of the defect, shape of the defect and the proximity to free margins an island pedicle advancement flap was deemed most appropriate. Using a sterile surgical marker, an appropriate advancement flap was drawn incorporating the defect, outlining the appropriate donor tissue and placing the expected incisions within the relaxed skin tension lines where possible. The area thus outlined was incised deep to adipose tissue with a #15 scalpel blade. The skin margins were undermined to an appropriate distance in all directions around the primary defect and laterally outward around the island pedicle utilizing iris scissors. There was minimal undermining beneath the pedicle flap. Island Pedicle Flap With Canthal Suspension Text: The defect edges were debeveled with a #15 scalpel blade. Given the location of the defect, shape of the defect and the proximity to free margins an island pedicle advancement flap was deemed most appropriate. Using a sterile surgical marker, an appropriate advancement flap was drawn incorporating the defect, outlining the appropriate donor tissue and placing the expected incisions within the relaxed skin tension lines where possible. The area thus outlined was incised deep to adipose tissue with a #15 scalpel blade. The skin margins were undermined to an appropriate distance in all directions around the primary defect and laterally outward around the island pedicle utilizing iris scissors. There was minimal undermining beneath the pedicle flap. A suspension suture was placed in the canthal tendon to prevent tension and prevent ectropion. Alar Island Pedicle Flap Text: The defect edges were debeveled with a #15 scalpel blade. Given the location of the defect, shape of the defect and the proximity to the alar rim an island pedicle advancement flap was deemed most appropriate. Using a sterile surgical marker, an appropriate advancement flap was drawn incorporating the defect, outlining the appropriate donor tissue and placing the expected incisions within the nasal ala running parallel to the alar rim. The area thus outlined was incised with a #15 scalpel blade. The skin margins were undermined minimally to an appropriate distance in all directions around the primary defect and laterally outward around the island pedicle utilizing iris scissors. There was minimal undermining beneath the pedicle flap. Double Island Pedicle Flap Text: The defect edges were debeveled with a #15 scalpel blade. Given the location of the defect, shape of the defect and the proximity to free margins a double island pedicle advancement flap was deemed most appropriate. Using a sterile surgical marker, an appropriate advancement flap was drawn incorporating the defect, outlining the appropriate donor tissue and placing the expected incisions within the relaxed skin tension lines where possible. The area thus outlined was incised deep to adipose tissue with a #15 scalpel blade. The skin margins were undermined to an appropriate distance in all directions around the primary defect and laterally outward around the island pedicle utilizing iris scissors. There was minimal undermining beneath the pedicle flap. Island Pedicle Flap-Requiring Vessel Identification Text: The defect edges were debeveled with a #15 scalpel blade. Given the location of the defect, shape of the defect and the proximity to free margins an island pedicle advancement flap was deemed most appropriate. Using a sterile surgical marker, an appropriate advancement flap was drawn, based on the axial vessel mentioned above, incorporating the defect, outlining the appropriate donor tissue and placing the expected incisions within the relaxed skin tension lines where possible. The area thus outlined was incised deep to adipose tissue with a #15 scalpel blade. The skin margins were undermined to an appropriate distance in all directions around the primary defect and laterally outward around the island pedicle utilizing iris scissors. There was minimal undermining beneath the pedicle flap. Keystone Flap Text: The defect edges were debeveled with a #15 scalpel blade. Given the location of the defect, shape of the defect a keystone flap was deemed most appropriate. Using a sterile surgical marker, an appropriate keystone flap was drawn incorporating the defect, outlining the appropriate donor tissue and placing the expected incisions within the relaxed skin tension lines where possible. The area thus outlined was incised deep to adipose tissue with a #15 scalpel blade. The skin margins were undermined to an appropriate distance in all directions around the primary defect and laterally outward around the flap utilizing iris scissors. O-T Plasty Text: The defect edges were debeveled with a #15 scalpel blade. Given the location of the defect, shape of the defect and the proximity to free margins an O-T plasty was deemed most appropriate. Using a sterile surgical marker, an appropriate O-T plasty was drawn incorporating the defect and placing the expected incisions within the relaxed skin tension lines where possible. The area thus outlined was incised deep to adipose tissue with a #15 scalpel blade. The skin margins were undermined to an appropriate distance in all directions utilizing iris scissors. O-Z Plasty Text: The defect edges were debeveled with a #15 scalpel blade. Given the location of the defect, shape of the defect and the proximity to free margins an O-Z plasty (double transposition flap) was deemed most appropriate. Using a sterile surgical marker, the appropriate transposition flaps were drawn incorporating the defect and placing the expected incisions within the relaxed skin tension lines where possible. The area thus outlined was incised deep to adipose tissue with a #15 scalpel blade. The skin margins were undermined to an appropriate distance in all directions utilizing iris scissors. Hemostasis was achieved with electrocautery. The flaps were then transposed into place, one clockwise and the other counterclockwise, and anchored with interrupted buried subcutaneous sutures. Double O-Z Plasty Text: The defect edges were debeveled with a #15 scalpel blade. Given the location of the defect, shape of the defect and the proximity to free margins a Double O-Z plasty (double transposition flap) was deemed most appropriate. Using a sterile surgical marker, the appropriate transposition flaps were drawn incorporating the defect and placing the expected incisions within the relaxed skin tension lines where possible. The area thus outlined was incised deep to adipose tissue with a #15 scalpel blade. The skin margins were undermined to an appropriate distance in all directions utilizing iris scissors. Hemostasis was achieved with electrocautery. The flaps were then transposed into place, one clockwise and the other counterclockwise, and anchored with interrupted buried subcutaneous sutures. V-Y Plasty Text: The defect edges were debeveled with a #15 scalpel blade. Given the location of the defect, shape of the defect and the proximity to free margins an V-Y advancement flap was deemed most appropriate. Using a sterile surgical marker, an appropriate advancement flap was drawn incorporating the defect and placing the expected incisions within the relaxed skin tension lines where possible. The area thus outlined was incised deep to adipose tissue with a #15 scalpel blade. The skin margins were undermined to an appropriate distance in all directions utilizing iris scissors. Blunted/Other H Plasty Text: Given the location of the defect, shape of the defect and the proximity to free margins a H-plasty was deemed most appropriate for repair. Using a sterile surgical marker, the appropriate advancement arms of the H-plasty were drawn incorporating the defect and placing the expected incisions within the relaxed skin tension lines where possible. The area thus outlined was incised deep to adipose tissue with a #15 scalpel blade. The skin margins were undermined to an appropriate distance in all directions utilizing iris scissors. The opposing advancement arms were then advanced into place in opposite direction and anchored with interrupted buried subcutaneous sutures. W Plasty Text: The lesion was extirpated to the level of the fat with a #15 scalpel blade. Given the location of the defect, shape of the defect and the proximity to free margins a W-plasty was deemed most appropriate for repair. Using a sterile surgical marker, the appropriate transposition arms of the W-plasty were drawn incorporating the defect and placing the expected incisions within the relaxed skin tension lines where possible. The area thus outlined was incised deep to adipose tissue with a #15 scalpel blade. The skin margins were undermined to an appropriate distance in all directions utilizing iris scissors. The opposing transposition arms were then transposed into place in opposite direction and anchored with interrupted buried subcutaneous sutures. Z Plasty Text: The lesion was extirpated to the level of the fat with a #15 scalpel blade. Given the location of the defect, shape of the defect and the proximity to free margins a Z-plasty was deemed most appropriate for repair. Using a sterile surgical marker, the appropriate transposition arms of the Z-plasty were drawn incorporating the defect and placing the expected incisions within the relaxed skin tension lines where possible. The area thus outlined was incised deep to adipose tissue with a #15 scalpel blade. The skin margins were undermined to an appropriate distance in all directions utilizing iris scissors. The opposing transposition arms were then transposed into place in opposite direction and anchored with interrupted buried subcutaneous sutures. Zygomaticofacial Flap Text: Given the location of the defect, shape of the defect and the proximity to free margins a zygomaticofacial flap was deemed most appropriate for repair. Using a sterile surgical marker, the appropriate flap was drawn incorporating the defect and placing the expected incisions within the relaxed skin tension lines where possible. The area thus outlined was incised deep to adipose tissue with a #15 scalpel blade with preservation of a vascular pedicle. The skin margins were undermined to an appropriate distance in all directions utilizing iris scissors. The flap was then placed into the defect and anchored with interrupted buried subcutaneous sutures. Cheek Interpolation Flap Text: A decision was made to reconstruct the defect utilizing an interpolation axial flap and a staged reconstruction. A telfa template was made of the defect. This telfa template was then used to outline the Cheek Interpolation flap. The donor area for the pedicle flap was then injected with anesthesia. The flap was excised through the skin and subcutaneous tissue down to the layer of the underlying musculature. The interpolation flap was carefully excised within this deep plane to maintain its blood supply. The edges of the donor site were undermined. The donor site was closed in a primary fashion. The pedicle was then rotated into position and sutured. Once the tube was sutured into place, adequate blood supply was confirmed with blanching and refill. The pedicle was then wrapped with xeroform gauze and dressed appropriately with a telfa and gauze bandage to ensure continued blood supply and protect the attached pedicle. Cheek-To-Nose Interpolation Flap Text: A decision was made to reconstruct the defect utilizing an interpolation axial flap and a staged reconstruction. A telfa template was made of the defect. This telfa template was then used to outline the Cheek-To-Nose Interpolation flap. The donor area for the pedicle flap was then injected with anesthesia. The flap was excised through the skin and subcutaneous tissue down to the layer of the underlying musculature. The interpolation flap was carefully excised within this deep plane to maintain its blood supply. The edges of the donor site were undermined. The donor site was closed in a primary fashion. The pedicle was then rotated into position and sutured. Once the tube was sutured into place, adequate blood supply was confirmed with blanching and refill. The pedicle was then wrapped with xeroform gauze and dressed appropriately with a telfa and gauze bandage to ensure continued blood supply and protect the attached pedicle. Interpolation Flap Text: A decision was made to reconstruct the defect utilizing an interpolation axial flap and a staged reconstruction. A telfa template was made of the defect. This telfa template was then used to outline the interpolation flap. The donor area for the pedicle flap was then injected with anesthesia. The flap was excised through the skin and subcutaneous tissue down to the layer of the underlying musculature. The interpolation flap was carefully excised within this deep plane to maintain its blood supply. The edges of the donor site were undermined. The donor site was closed in a primary fashion. The pedicle was then rotated into position and sutured. Once the tube was sutured into place, adequate blood supply was confirmed with blanching and refill. The pedicle was then wrapped with xeroform gauze and dressed appropriately with a telfa and gauze bandage to ensure continued blood supply and protect the attached pedicle. Melolabial Interpolation Flap Text: A decision was made to reconstruct the defect utilizing an interpolation axial flap and a staged reconstruction. A telfa template was made of the defect. This telfa template was then used to outline the melolabial interpolation flap. The donor area for the pedicle flap was then injected with anesthesia. The flap was excised through the skin and subcutaneous tissue down to the layer of the underlying musculature. The pedicle flap was carefully excised within this deep plane to maintain its blood supply. The edges of the donor site were undermined. The donor site was closed in a primary fashion. The pedicle was then rotated into position and sutured. Once the tube was sutured into place, adequate blood supply was confirmed with blanching and refill. The pedicle was then wrapped with xeroform gauze and dressed appropriately with a telfa and gauze bandage to ensure continued blood supply and protect the attached pedicle. Mastoid Interpolation Flap Text: A decision was made to reconstruct the defect utilizing an interpolation axial flap and a staged reconstruction. A telfa template was made of the defect. This telfa template was then used to outline the mastoid interpolation flap. The donor area for the pedicle flap was then injected with anesthesia. The flap was excised through the skin and subcutaneous tissue down to the layer of the underlying musculature. The pedicle flap was carefully excised within this deep plane to maintain its blood supply. The edges of the donor site were undermined. The donor site was closed in a primary fashion. The pedicle was then rotated into position and sutured. Once the tube was sutured into place, adequate blood supply was confirmed with blanching and refill. The pedicle was then wrapped with xeroform gauze and dressed appropriately with a telfa and gauze bandage to ensure continued blood supply and protect the attached pedicle. Posterior Auricular Interpolation Flap Text: A decision was made to reconstruct the defect utilizing an interpolation axial flap and a staged reconstruction. A telfa template was made of the defect. This telfa template was then used to outline the posterior auricular interpolation flap. The donor area for the pedicle flap was then injected with anesthesia. The flap was excised through the skin and subcutaneous tissue down to the layer of the underlying musculature. The pedicle flap was carefully excised within this deep plane to maintain its blood supply. The edges of the donor site were undermined. The donor site was closed in a primary fashion. The pedicle was then rotated into position and sutured. Once the tube was sutured into place, adequate blood supply was confirmed with blanching and refill. The pedicle was then wrapped with xeroform gauze and dressed appropriately with a telfa and gauze bandage to ensure continued blood supply and protect the attached pedicle. Paramedian Forehead Flap Text: A decision was made to reconstruct the defect utilizing an interpolation axial flap and a staged reconstruction. A telfa template was made of the defect. This telfa template was then used to outline the paramedian forehead pedicle flap. The donor area for the pedicle flap was then injected with anesthesia. The flap was excised through the skin and subcutaneous tissue down to the layer of the underlying musculature. The pedicle flap was carefully excised within this deep plane to maintain its blood supply. The edges of the donor site were undermined. The donor site was closed in a primary fashion. The pedicle was then rotated into position and sutured. Once the tube was sutured into place, adequate blood supply was confirmed with blanching and refill. The pedicle was then wrapped with xeroform gauze and dressed appropriately with a telfa and gauze bandage to ensure continued blood supply and protect the attached pedicle. Abbe Flap (Upper To Lower Lip) Text: The defect of the lower lip was assessed and measured. Given the location and size of the defect, an Abbe flap was deemed most appropriate. Using a sterile surgical marker, an appropriate Abbe flap was measured and drawn on the upper lip. Local anesthesia was then infiltrated. A scalpel was then used to incise the upper lip through and through the skin, vermilion, muscle and mucosa, leaving the flap pedicled on the opposite side. The flap was then rotated and transferred to the lower lip defect. The flap was then sutured into place with a three layer technique, closing the orbicularis oris muscle layer with subcutaneous buried sutures, followed by a mucosal layer and an epidermal layer. Abbe Flap (Lower To Upper Lip) Text: The defect of the upper lip was assessed and measured. Given the location and size of the defect, an Abbe flap was deemed most appropriate. Using a sterile surgical marker, an appropriate Abbe flap was measured and drawn on the lower lip. Local anesthesia was then infiltrated. A scalpel was then used to incise the upper lip through and through the skin, vermilion, muscle and mucosa, leaving the flap pedicled on the opposite side. The flap was then rotated and transferred to the lower lip defect. The flap was then sutured into place with a three layer technique, closing the orbicularis oris muscle layer with subcutaneous buried sutures, followed by a mucosal layer and an epidermal layer. Estlander Flap (Upper To Lower Lip) Text: The defect of the lower lip was assessed and measured. Given the location and size of the defect, an Estlander flap was deemed most appropriate. Using a sterile surgical marker, an appropriate Estlander flap was measured and drawn on the upper lip. Local anesthesia was then infiltrated. A scalpel was then used to incise the lateral aspect of the flap, through skin, muscle and mucosa, leaving the flap pedicled medially. The flap was then rotated and positioned to fill the lower lip defect. The flap was then sutured into place with a three layer technique, closing the orbicularis oris muscle layer with subcutaneous buried sutures, followed by a mucosal layer and an epidermal layer. Estlander Flap (Lower To Upper Lip) Text: The defect of the lower lip was assessed and measured.  Given the location and size of the defect, an Estlander flap was deemed most appropriate.  Using a sterile surgical marker, an appropriate Estlander flap was measured and drawn on the upper lip. Local anesthesia was then infiltrated. A scalpel was then used to incise the lateral aspect of the flap, through skin, muscle and mucosa, leaving the flap pedicled medially.  The flap was then rotated and positioned to fill the lower lip defect.  The flap was then sutured into place with a three layer technique, closing the orbicularis oris muscle layer with subcutaneous buried sutures, followed by a mucosal layer and an epidermal layer. Lip Wedge Excision Repair Text: Given the location of the defect and the proximity to free margins a full thickness wedge repair was deemed most appropriate. Using a sterile surgical marker, the appropriate repair was drawn incorporating the defect and placing the expected incisions perpendicular to the vermilion border. The vermilion border was also meticulously outlined to ensure appropriate reapproximation during the repair. The area thus outlined was incised through and through with a #15 scalpel blade. The muscularis and dermis were reaproximated with deep sutures following hemostasis. Care was taken to realign the vermilion border before proceeding with the superficial closure. Once the vermilion was realigned the superfical and mucosal closure was finished. Ftsg Text: The defect edges were debeveled with a #15 scalpel blade. Given the location of the defect, shape of the defect and the proximity to free margins a full thickness skin graft was deemed most appropriate. Using a sterile surgical marker, the primary defect shape was transferred to the donor site. The area thus outlined was incised deep to adipose tissue with a #15 scalpel blade. The harvested graft was then trimmed of adipose tissue until only dermis and epidermis was left. The skin margins of the secondary defect were undermined to an appropriate distance in all directions utilizing iris scissors. The secondary defect was closed with interrupted buried subcutaneous sutures. The skin edges were then re-apposed with running  sutures. The skin graft was then placed in the primary defect and oriented appropriately. Split-Thickness Skin Graft Text: The defect edges were debeveled with a #15 scalpel blade. Given the location of the defect, shape of the defect and the proximity to free margins a split thickness skin graft was deemed most appropriate. Using a sterile surgical marker, the primary defect shape was transferred to the donor site. The split thickness graft was then harvested. The skin graft was then placed in the primary defect and oriented appropriately. Burow's Graft Text: The defect edges were debeveled with a #15 scalpel blade. Given the location of the defect, shape of the defect, the proximity to free margins and the presence of a standing cone deformity a Burow's skin graft was deemed most appropriate. The standing cone was removed and this tissue was then trimmed to the shape of the primary defect. The adipose tissue was also removed until only dermis and epidermis were left. The skin margins of the secondary defect were undermined to an appropriate distance in all directions utilizing iris scissors. The secondary defect was closed with interrupted buried subcutaneous sutures. The skin edges were then re-apposed with running  sutures. The skin graft was then placed in the primary defect and oriented appropriately. Cartilage Graft Text: The defect edges were debeveled with a #15 scalpel blade. Given the location of the defect, shape of the defect, the fact the defect involved a full thickness cartilage defect a cartilage graft was deemed most appropriate. An appropriate donor site was identified, cleansed, and anesthetized. The cartilage graft was then harvested and transferred to the recipient site, oriented appropriately and then sutured into place. The secondary defect was then repaired using a primary closure. Composite Graft Text: The defect edges were debeveled with a #15 scalpel blade. Given the location of the defect, shape of the defect, the proximity to free margins and the fact the defect was full thickness a composite graft was deemed most appropriate. The defect was outline and then transferred to the donor site. A full thickness graft was then excised from the donor site. The graft was then placed in the primary defect, oriented appropriately and then sutured into place. The secondary defect was then repaired using a primary closure. Epidermal Autograft Text: The defect edges were debeveled with a #15 scalpel blade. Given the location of the defect, shape of the defect and the proximity to free margins an epidermal autograft was deemed most appropriate. Using a sterile surgical marker, the primary defect shape was transferred to the donor site. The epidermal graft was then harvested. The skin graft was then placed in the primary defect and oriented appropriately. Dermal Autograft Text: The defect edges were debeveled with a #15 scalpel blade. Given the location of the defect, shape of the defect and the proximity to free margins a dermal autograft was deemed most appropriate. Using a sterile surgical marker, the primary defect shape was transferred to the donor site. The area thus outlined was incised deep to adipose tissue with a #15 scalpel blade. The harvested graft was then trimmed of adipose and epidermal tissue until only dermis was left. The skin graft was then placed in the primary defect and oriented appropriately. Skin Substitute Text: The defect edges were debeveled with a #15 scalpel blade. Given the location of the defect, shape of the defect and the proximity to free margins a skin substitute graft was deemed most appropriate. The graft material was trimmed to fit the size of the defect. The graft was then placed in the primary defect and oriented appropriately. Tissue Cultured Epidermal Autograft Text: The defect edges were debeveled with a #15 scalpel blade. Given the location of the defect, shape of the defect and the proximity to free margins a tissue cultured epidermal autograft was deemed most appropriate. The graft was then trimmed to fit the size of the defect. The graft was then placed in the primary defect and oriented appropriately. Xenograft Text: The defect edges were debeveled with a #15 scalpel blade. Given the location of the defect, shape of the defect and the proximity to free margins a xenograft was deemed most appropriate. The graft was then trimmed to fit the size of the defect. The graft was then placed in the primary defect and oriented appropriately. Purse String (Intermediate) Text: Given the location of the defect and the characteristics of the surrounding skin a purse string intermediate closure was deemed most appropriate. Undermining was performed circumferentially around the surgical defect. A purse string suture was then placed and tightened. Purse String (Simple) Text: Given the location of the defect and the characteristics of the surrounding skin a purse string simple closure was deemed most appropriate. Undermining was performed circumferentially around the surgical defect. A purse string suture was then placed and tightened. Complex Repair And Single Advancement Flap Text: The defect edges were debeveled with a #15 scalpel blade. The primary defect was closed partially with a complex linear closure. Given the location of the remaining defect, shape of the defect and the proximity to free margins a single advancement flap was deemed most appropriate for complete closure of the defect. Using a sterile surgical marker, an appropriate advancement flap was drawn incorporating the defect and placing the expected incisions within the relaxed skin tension lines where possible. The area thus outlined was incised deep to adipose tissue with a #15 scalpel blade. The skin margins were undermined to an appropriate distance in all directions utilizing iris scissors. Complex Repair And Double Advancement Flap Text: The defect edges were debeveled with a #15 scalpel blade. The primary defect was closed partially with a complex linear closure. Given the location of the remaining defect, shape of the defect and the proximity to free margins a double advancement flap was deemed most appropriate for complete closure of the defect. Using a sterile surgical marker, an appropriate advancement flap was drawn incorporating the defect and placing the expected incisions within the relaxed skin tension lines where possible. The area thus outlined was incised deep to adipose tissue with a #15 scalpel blade. The skin margins were undermined to an appropriate distance in all directions utilizing iris scissors. Complex Repair And Modified Advancement Flap Text: The defect edges were debeveled with a #15 scalpel blade. The primary defect was closed partially with a complex linear closure. Given the location of the remaining defect, shape of the defect and the proximity to free margins a modified advancement flap was deemed most appropriate for complete closure of the defect. Using a sterile surgical marker, an appropriate advancement flap was drawn incorporating the defect and placing the expected incisions within the relaxed skin tension lines where possible. The area thus outlined was incised deep to adipose tissue with a #15 scalpel blade. The skin margins were undermined to an appropriate distance in all directions utilizing iris scissors. Complex Repair And A-T Advancement Flap Text: The defect edges were debeveled with a #15 scalpel blade. The primary defect was closed partially with a complex linear closure. Given the location of the remaining defect, shape of the defect and the proximity to free margins an A-T advancement flap was deemed most appropriate for complete closure of the defect. Using a sterile surgical marker, an appropriate advancement flap was drawn incorporating the defect and placing the expected incisions within the relaxed skin tension lines where possible. The area thus outlined was incised deep to adipose tissue with a #15 scalpel blade. The skin margins were undermined to an appropriate distance in all directions utilizing iris scissors. Complex Repair And O-T Advancement Flap Text: The defect edges were debeveled with a #15 scalpel blade. The primary defect was closed partially with a complex linear closure. Given the location of the remaining defect, shape of the defect and the proximity to free margins an O-T advancement flap was deemed most appropriate for complete closure of the defect. Using a sterile surgical marker, an appropriate advancement flap was drawn incorporating the defect and placing the expected incisions within the relaxed skin tension lines where possible. The area thus outlined was incised deep to adipose tissue with a #15 scalpel blade. The skin margins were undermined to an appropriate distance in all directions utilizing iris scissors. Complex Repair And O-L Flap Text: The defect edges were debeveled with a #15 scalpel blade. The primary defect was closed partially with a complex linear closure. Given the location of the remaining defect, shape of the defect and the proximity to free margins an O-L flap was deemed most appropriate for complete closure of the defect. Using a sterile surgical marker, an appropriate flap was drawn incorporating the defect and placing the expected incisions within the relaxed skin tension lines where possible. The area thus outlined was incised deep to adipose tissue with a #15 scalpel blade. The skin margins were undermined to an appropriate distance in all directions utilizing iris scissors. Complex Repair And Bilobe Flap Text: The defect edges were debeveled with a #15 scalpel blade. The primary defect was closed partially with a complex linear closure. Given the location of the remaining defect, shape of the defect and the proximity to free margins a bilobe flap was deemed most appropriate for complete closure of the defect. Using a sterile surgical marker, an appropriate advancement flap was drawn incorporating the defect and placing the expected incisions within the relaxed skin tension lines where possible. The area thus outlined was incised deep to adipose tissue with a #15 scalpel blade. The skin margins were undermined to an appropriate distance in all directions utilizing iris scissors. Complex Repair And Melolabial Flap Text: The defect edges were debeveled with a #15 scalpel blade. The primary defect was closed partially with a complex linear closure. Given the location of the remaining defect, shape of the defect and the proximity to free margins a melolabial flap was deemed most appropriate for complete closure of the defect. Using a sterile surgical marker, an appropriate advancement flap was drawn incorporating the defect and placing the expected incisions within the relaxed skin tension lines where possible. The area thus outlined was incised deep to adipose tissue with a #15 scalpel blade. The skin margins were undermined to an appropriate distance in all directions utilizing iris scissors. Complex Repair And Rotation Flap Text: The defect edges were debeveled with a #15 scalpel blade. The primary defect was closed partially with a complex linear closure. Given the location of the remaining defect, shape of the defect and the proximity to free margins a rotation flap was deemed most appropriate for complete closure of the defect. Using a sterile surgical marker, an appropriate advancement flap was drawn incorporating the defect and placing the expected incisions within the relaxed skin tension lines where possible. The area thus outlined was incised deep to adipose tissue with a #15 scalpel blade. The skin margins were undermined to an appropriate distance in all directions utilizing iris scissors. Complex Repair And Rhombic Flap Text: The defect edges were debeveled with a #15 scalpel blade. The primary defect was closed partially with a complex linear closure. Given the location of the remaining defect, shape of the defect and the proximity to free margins a rhombic flap was deemed most appropriate for complete closure of the defect. Using a sterile surgical marker, an appropriate advancement flap was drawn incorporating the defect and placing the expected incisions within the relaxed skin tension lines where possible. The area thus outlined was incised deep to adipose tissue with a #15 scalpel blade. The skin margins were undermined to an appropriate distance in all directions utilizing iris scissors. Complex Repair And Transposition Flap Text: The defect edges were debeveled with a #15 scalpel blade. The primary defect was closed partially with a complex linear closure. Given the location of the remaining defect, shape of the defect and the proximity to free margins a transposition flap was deemed most appropriate for complete closure of the defect. Using a sterile surgical marker, an appropriate advancement flap was drawn incorporating the defect and placing the expected incisions within the relaxed skin tension lines where possible. The area thus outlined was incised deep to adipose tissue with a #15 scalpel blade. The skin margins were undermined to an appropriate distance in all directions utilizing iris scissors. Complex Repair And V-Y Plasty Text: The defect edges were debeveled with a #15 scalpel blade. The primary defect was closed partially with a complex linear closure. Given the location of the remaining defect, shape of the defect and the proximity to free margins a V-Y plasty was deemed most appropriate for complete closure of the defect. Using a sterile surgical marker, an appropriate advancement flap was drawn incorporating the defect and placing the expected incisions within the relaxed skin tension lines where possible. The area thus outlined was incised deep to adipose tissue with a #15 scalpel blade. The skin margins were undermined to an appropriate distance in all directions utilizing iris scissors. Complex Repair And M Plasty Text: The defect edges were debeveled with a #15 scalpel blade. The primary defect was closed partially with a complex linear closure. Given the location of the remaining defect, shape of the defect and the proximity to free margins an M plasty was deemed most appropriate for complete closure of the defect. Using a sterile surgical marker, an appropriate advancement flap was drawn incorporating the defect and placing the expected incisions within the relaxed skin tension lines where possible. The area thus outlined was incised deep to adipose tissue with a #15 scalpel blade. The skin margins were undermined to an appropriate distance in all directions utilizing iris scissors. Complex Repair And Double M Plasty Text: The defect edges were debeveled with a #15 scalpel blade. The primary defect was closed partially with a complex linear closure. Given the location of the remaining defect, shape of the defect and the proximity to free margins a double M plasty was deemed most appropriate for complete closure of the defect. Using a sterile surgical marker, an appropriate advancement flap was drawn incorporating the defect and placing the expected incisions within the relaxed skin tension lines where possible. The area thus outlined was incised deep to adipose tissue with a #15 scalpel blade. The skin margins were undermined to an appropriate distance in all directions utilizing iris scissors. Complex Repair And W Plasty Text: The defect edges were debeveled with a #15 scalpel blade. The primary defect was closed partially with a complex linear closure. Given the location of the remaining defect, shape of the defect and the proximity to free margins a W plasty was deemed most appropriate for complete closure of the defect. Using a sterile surgical marker, an appropriate advancement flap was drawn incorporating the defect and placing the expected incisions within the relaxed skin tension lines where possible. The area thus outlined was incised deep to adipose tissue with a #15 scalpel blade. The skin margins were undermined to an appropriate distance in all directions utilizing iris scissors. Complex Repair And Z Plasty Text: The defect edges were debeveled with a #15 scalpel blade. The primary defect was closed partially with a complex linear closure. Given the location of the remaining defect, shape of the defect and the proximity to free margins a Z plasty was deemed most appropriate for complete closure of the defect. Using a sterile surgical marker, an appropriate advancement flap was drawn incorporating the defect and placing the expected incisions within the relaxed skin tension lines where possible. The area thus outlined was incised deep to adipose tissue with a #15 scalpel blade. The skin margins were undermined to an appropriate distance in all directions utilizing iris scissors. Complex Repair And Dorsal Nasal Flap Text: The defect edges were debeveled with a #15 scalpel blade. The primary defect was closed partially with a complex linear closure. Given the location of the remaining defect, shape of the defect and the proximity to free margins a dorsal nasal flap was deemed most appropriate for complete closure of the defect. Using a sterile surgical marker, an appropriate flap was drawn incorporating the defect and placing the expected incisions within the relaxed skin tension lines where possible. The area thus outlined was incised deep to adipose tissue with a #15 scalpel blade. The skin margins were undermined to an appropriate distance in all directions utilizing iris scissors. Complex Repair And Ftsg Text: The defect edges were debeveled with a #15 scalpel blade. The primary defect was closed partially with a complex linear closure. Given the location of the defect, shape of the defect and the proximity to free margins a full thickness skin graft was deemed most appropriate to repair the remaining defect. The graft was trimmed to fit the size of the remaining defect. The graft was then placed in the primary defect, oriented appropriately, and sutured into place. Complex Repair And Burow's Graft Text: The defect edges were debeveled with a #15 scalpel blade. The primary defect was closed partially with a complex linear closure. Given the location of the defect, shape of the defect, the proximity to free margins and the presence of a standing cone deformity a Burow's graft was deemed most appropriate to repair the remaining defect. The graft was trimmed to fit the size of the remaining defect. The graft was then placed in the primary defect, oriented appropriately, and sutured into place. Complex Repair And Split-Thickness Skin Graft Text: The defect edges were debeveled with a #15 scalpel blade. The primary defect was closed partially with a complex linear closure. Given the location of the defect, shape of the defect and the proximity to free margins a split thickness skin graft was deemed most appropriate to repair the remaining defect. The graft was trimmed to fit the size of the remaining defect. The graft was then placed in the primary defect, oriented appropriately, and sutured into place. Complex Repair And Epidermal Autograft Text: The defect edges were debeveled with a #15 scalpel blade. The primary defect was closed partially with a complex linear closure. Given the location of the defect, shape of the defect and the proximity to free margins an epidermal autograft was deemed most appropriate to repair the remaining defect. The graft was trimmed to fit the size of the remaining defect. The graft was then placed in the primary defect, oriented appropriately, and sutured into place. Complex Repair And Dermal Autograft Text: The defect edges were debeveled with a #15 scalpel blade. The primary defect was closed partially with a complex linear closure. Given the location of the defect, shape of the defect and the proximity to free margins an dermal autograft was deemed most appropriate to repair the remaining defect. The graft was trimmed to fit the size of the remaining defect. The graft was then placed in the primary defect, oriented appropriately, and sutured into place. Complex Repair And Tissue Cultured Epidermal Autograft Text: The defect edges were debeveled with a #15 scalpel blade. The primary defect was closed partially with a complex linear closure. Given the location of the defect, shape of the defect and the proximity to free margins an tissue cultured epidermal autograft was deemed most appropriate to repair the remaining defect. The graft was trimmed to fit the size of the remaining defect. The graft was then placed in the primary defect, oriented appropriately, and sutured into place. Complex Repair And Xenograft Text: The defect edges were debeveled with a #15 scalpel blade. The primary defect was closed partially with a complex linear closure. Given the location of the defect, shape of the defect and the proximity to free margins a xenograft was deemed most appropriate to repair the remaining defect. The graft was trimmed to fit the size of the remaining defect. The graft was then placed in the primary defect, oriented appropriately, and sutured into place. Complex Repair And Skin Substitute Graft Text: The defect edges were debeveled with a #15 scalpel blade. The primary defect was closed partially with a complex linear closure. Given the location of the remaining defect, shape of the defect and the proximity to free margins a skin substitute graft was deemed most appropriate to repair the remaining defect. The graft was trimmed to fit the size of the remaining defect. The graft was then placed in the primary defect, oriented appropriately, and sutured into place. Consent was obtained from the patient. The risks and benefits to therapy were discussed in detail. Specifically, the risks of infection, scarring, bleeding, prolonged wound healing, incomplete removal, allergy to anesthesia, nerve injury and recurrence were addressed. Prior to the procedure, the treatment site was clearly identified and confirmed by the patient. All components of Universal Protocol/PAUSE Rule completed. Post-Care Instructions: I reviewed with the patient in detail post-care instructions. Patient is not to engage in any heavy lifting, exercise, or swimming for the next 14 days. Should the patient develop any fevers, chills, bleeding, severe pain patient will contact the office immediately. Home Suture Removal Text: Patient was provided a home suture removal kit and will remove their sutures at home. If they have any questions or difficulties they will call the office. Where Do You Want The Question To Include Opioid Counseling Located?: Case Summary Tab Billing Type: [employer] Information: Selecting Yes will display possible errors in your note based on the variables you have selected. This validation is only offered as a suggestion for you. PLEASE NOTE THAT THE VALIDATION TEXT WILL BE REMOVED WHEN YOU FINALIZE YOUR NOTE. IF YOU WANT TO FAX A PRELIMINARY NOTE YOU WILL NEED TO TOGGLE THIS TO 'NO' IF YOU DO NOT WANT IT IN YOUR FAXED NOTE.

## 2023-01-03 NOTE — ED ADULT NURSE NOTE - ISOLATION TYPE:
Airborne+Contact precautions Metronidazole Pregnancy And Lactation Text: This medication is Pregnancy Category B and considered safe during pregnancy.  It is also excreted in breast milk.

## 2023-01-05 ENCOUNTER — OUTPATIENT (OUTPATIENT)
Dept: OUTPATIENT SERVICES | Facility: HOSPITAL | Age: 73
LOS: 1 days | End: 2023-01-05
Payer: MEDICARE

## 2023-01-05 ENCOUNTER — APPOINTMENT (OUTPATIENT)
Dept: SPEECH THERAPY | Facility: HOSPITAL | Age: 73
End: 2023-01-05

## 2023-01-05 ENCOUNTER — APPOINTMENT (OUTPATIENT)
Dept: RADIOLOGY | Facility: HOSPITAL | Age: 73
End: 2023-01-05

## 2023-01-05 ENCOUNTER — NON-APPOINTMENT (OUTPATIENT)
Age: 73
End: 2023-01-05

## 2023-01-05 DIAGNOSIS — R10.13 EPIGASTRIC PAIN: ICD-10-CM

## 2023-01-05 PROCEDURE — 74230 X-RAY XM SWLNG FUNCJ C+: CPT | Mod: 26

## 2023-01-05 NOTE — BH INPATIENT PSYCHIATRY PROGRESS NOTE - NSTXFALLINTERMD_PSY_ALL_CORE
Medication pended for you.
PT consult
CO 
PT consult
CO 
CO 
PT consult
CO 
PT consult
CO 
PT consult
CO 
PT consult
CO 
CO

## 2023-01-09 NOTE — HISTORY OF PRESENT ILLNESS
[FreeTextEntry1] : Patient arrived to Radiology for an OutPatient Modified Barium Swallow Study. Patient was accompanied by Nursing Home Staff Member. Patient resides at [facility]. 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 Oropharyngeal Phase, Lichen Simplex Chronicus, Delusional Disorder, Old Myocardial Infarction, OVeractive Bladder, personal History of Malignant Neoplasm of Bladder, Vascular Dementia with Behavioral Disturbance, Unspecified Severe Protein-Calorie Malnutrition, Unspecified Asthma, Uncomplicated, Other Visual Disturbances. Nursing Home Baseline Diet: Minced Moist Texture/Mildly Thick Liquids.  This Modified Barium Swallow Study is to objectively assess the Physiology of the Oral and Pharyngeal Stage swallowing mechanism for treatment plan for least restrictive diet. \par \par Of Note: Patient is upper edentulous state and reports "waiting for my upper dentures" \par \par Referring MD: Dr. Elio Benitez\par Fax: [phone]\par Room: [facility] 4

## 2023-01-09 NOTE — ASSESSMENT
[FreeTextEntry1] : Patient presents with Mild Oral Stage and Moderate/Severe Pharyngeal Stage Dysphagia. The Oral Stage is characterized b y adequate oral containment, slow/prolonged chewing/mashing for soft bite size solids due to upper edentulous state with partial front bottom dentition only, slow bolus manipulation, slow tongue motion with slow anterior to posterior transfer of the bolus for soft bite size solid, adequate bolus manipulation and transfer for puree with adequate oral clearance.  The Pharyngeal Stage is characterized by delayed initiation of the  pharyngeal swallow (Bolus head is at the vallecular for thin liquids), reduced laryngeal elevation with incomplete vestibular closure, adequate tongue base retraction and adequate pharyngeal constriction. There is adequate pharyngeal clearance post swallow for puree and soft bite size solids. There was Laryngeal Penetration before the swallow for Thin Liquids to the level of the vocal folds with subsequent Aspiration during the swallow. Patient is sensate to the Aspiration given a reflexive cough response.  There was No Aspiration observed before, during or after the swallow for puree, soft bite size solids and mildly thick liquids. \par \par RESULTS: \par 1.) No Aspiration observed before, during or after the swallow for puree, soft bite size solids and mildly thick liquids.\par 2.) Laryngeal Penetration before the swallow for Thin Liquids to the level of the vocal folds with subsequent Aspiration during the swallow.\par \par Of Note: Incidental finding of Anterior Cervical Web (as per Radiologist) consistent with previous finding on last Modified Barium Swallow Study completed back on 5/31/2022 non-obstructing for bolus passage.

## 2023-01-11 NOTE — PROGRESS NOTE ADULT - PROBLEM SELECTOR PLAN 7
-Diet: passed dysphagia screen, however slow to swallow per RN. will start pureed diet  -VTE ppx: lovenox 40 mg subQ daily  -Dispo:Pt is medically optimized for discharge. Pending acceptance by inpatient psych facility Him/He -Diet: passed dysphagia screen, however slow to swallow per RN. will start pureed diet  -VTE ppx: lovenox 40 mg subQ daily  -Dispo:Pt is medically optimized for discharge. Pending acceptance by inpatient psych facility. May be considered by [facility], will send covid swab, CBC, CMP

## 2023-04-14 ENCOUNTER — EMERGENCY (EMERGENCY)
Facility: HOSPITAL | Age: 73
LOS: 1 days | Discharge: HOME | End: 2023-04-14
Attending: STUDENT IN AN ORGANIZED HEALTH CARE EDUCATION/TRAINING PROGRAM | Admitting: STUDENT IN AN ORGANIZED HEALTH CARE EDUCATION/TRAINING PROGRAM
Payer: MEDICARE

## 2023-04-14 VITALS
DIASTOLIC BLOOD PRESSURE: 61 MMHG | SYSTOLIC BLOOD PRESSURE: 112 MMHG | HEART RATE: 91 BPM | TEMPERATURE: 99 F | RESPIRATION RATE: 16 BRPM | OXYGEN SATURATION: 100 %

## 2023-04-14 VITALS
DIASTOLIC BLOOD PRESSURE: 74 MMHG | RESPIRATION RATE: 19 BRPM | OXYGEN SATURATION: 100 % | HEART RATE: 76 BPM | TEMPERATURE: 99 F | SYSTOLIC BLOOD PRESSURE: 129 MMHG

## 2023-04-14 LAB
ALBUMIN SERPL ELPH-MCNC: 4 G/DL — SIGNIFICANT CHANGE UP (ref 3.3–5)
ALP SERPL-CCNC: 127 U/L — HIGH (ref 40–120)
ALT FLD-CCNC: 12 U/L — SIGNIFICANT CHANGE UP (ref 4–33)
ANION GAP SERPL CALC-SCNC: 10 MMOL/L — SIGNIFICANT CHANGE UP (ref 7–14)
APPEARANCE UR: CLEAR — SIGNIFICANT CHANGE UP
AST SERPL-CCNC: 89 U/L — HIGH (ref 4–32)
BACTERIA # UR AUTO: ABNORMAL
BASOPHILS # BLD AUTO: 0.05 K/UL — SIGNIFICANT CHANGE UP (ref 0–0.2)
BASOPHILS NFR BLD AUTO: 0.6 % — SIGNIFICANT CHANGE UP (ref 0–2)
BILIRUB SERPL-MCNC: 0.2 MG/DL — SIGNIFICANT CHANGE UP (ref 0.2–1.2)
BILIRUB UR-MCNC: NEGATIVE — SIGNIFICANT CHANGE UP
BUN SERPL-MCNC: 20 MG/DL — SIGNIFICANT CHANGE UP (ref 7–23)
CALCIUM SERPL-MCNC: 9.6 MG/DL — SIGNIFICANT CHANGE UP (ref 8.4–10.5)
CHLORIDE SERPL-SCNC: 109 MMOL/L — HIGH (ref 98–107)
CO2 SERPL-SCNC: 22 MMOL/L — SIGNIFICANT CHANGE UP (ref 22–31)
COLOR SPEC: SIGNIFICANT CHANGE UP
CREAT SERPL-MCNC: 0.92 MG/DL — SIGNIFICANT CHANGE UP (ref 0.5–1.3)
DIFF PNL FLD: NEGATIVE — SIGNIFICANT CHANGE UP
EGFR: 67 ML/MIN/1.73M2 — SIGNIFICANT CHANGE UP
EOSINOPHIL # BLD AUTO: 0.23 K/UL — SIGNIFICANT CHANGE UP (ref 0–0.5)
EOSINOPHIL NFR BLD AUTO: 2.6 % — SIGNIFICANT CHANGE UP (ref 0–6)
FLUAV AG NPH QL: SIGNIFICANT CHANGE UP
FLUBV AG NPH QL: SIGNIFICANT CHANGE UP
GLUCOSE SERPL-MCNC: 95 MG/DL — SIGNIFICANT CHANGE UP (ref 70–99)
GLUCOSE UR QL: NEGATIVE — SIGNIFICANT CHANGE UP
HCT VFR BLD CALC: 35.1 % — SIGNIFICANT CHANGE UP (ref 34.5–45)
HGB BLD-MCNC: 10.9 G/DL — LOW (ref 11.5–15.5)
IANC: 6.07 K/UL — SIGNIFICANT CHANGE UP (ref 1.8–7.4)
IMM GRANULOCYTES NFR BLD AUTO: 0.3 % — SIGNIFICANT CHANGE UP (ref 0–0.9)
KETONES UR-MCNC: NEGATIVE — SIGNIFICANT CHANGE UP
LEUKOCYTE ESTERASE UR-ACNC: NEGATIVE — SIGNIFICANT CHANGE UP
LYMPHOCYTES # BLD AUTO: 1.65 K/UL — SIGNIFICANT CHANGE UP (ref 1–3.3)
LYMPHOCYTES # BLD AUTO: 18.5 % — SIGNIFICANT CHANGE UP (ref 13–44)
MCHC RBC-ENTMCNC: 27.5 PG — SIGNIFICANT CHANGE UP (ref 27–34)
MCHC RBC-ENTMCNC: 31.1 GM/DL — LOW (ref 32–36)
MCV RBC AUTO: 88.4 FL — SIGNIFICANT CHANGE UP (ref 80–100)
MONOCYTES # BLD AUTO: 0.89 K/UL — SIGNIFICANT CHANGE UP (ref 0–0.9)
MONOCYTES NFR BLD AUTO: 10 % — SIGNIFICANT CHANGE UP (ref 2–14)
NEUTROPHILS # BLD AUTO: 6.07 K/UL — SIGNIFICANT CHANGE UP (ref 1.8–7.4)
NEUTROPHILS NFR BLD AUTO: 68 % — SIGNIFICANT CHANGE UP (ref 43–77)
NITRITE UR-MCNC: NEGATIVE — SIGNIFICANT CHANGE UP
NRBC # BLD: 0 /100 WBCS — SIGNIFICANT CHANGE UP (ref 0–0)
NRBC # FLD: 0 K/UL — SIGNIFICANT CHANGE UP (ref 0–0)
PH UR: 6 — SIGNIFICANT CHANGE UP (ref 5–8)
PLATELET # BLD AUTO: 253 K/UL — SIGNIFICANT CHANGE UP (ref 150–400)
POTASSIUM SERPL-MCNC: 4.3 MMOL/L — SIGNIFICANT CHANGE UP (ref 3.5–5.3)
POTASSIUM SERPL-SCNC: 4.3 MMOL/L — SIGNIFICANT CHANGE UP (ref 3.5–5.3)
PROT SERPL-MCNC: 7.5 G/DL — SIGNIFICANT CHANGE UP (ref 6–8.3)
PROT UR-MCNC: NEGATIVE — SIGNIFICANT CHANGE UP
RBC # BLD: 3.97 M/UL — SIGNIFICANT CHANGE UP (ref 3.8–5.2)
RBC # FLD: 13.8 % — SIGNIFICANT CHANGE UP (ref 10.3–14.5)
RBC CASTS # UR COMP ASSIST: SIGNIFICANT CHANGE UP /HPF (ref 0–4)
RSV RNA NPH QL NAA+NON-PROBE: SIGNIFICANT CHANGE UP
SARS-COV-2 RNA SPEC QL NAA+PROBE: SIGNIFICANT CHANGE UP
SODIUM SERPL-SCNC: 141 MMOL/L — SIGNIFICANT CHANGE UP (ref 135–145)
SP GR SPEC: 1.01 — SIGNIFICANT CHANGE UP (ref 1.01–1.05)
UROBILINOGEN FLD QL: SIGNIFICANT CHANGE UP
WBC # BLD: 8.92 K/UL — SIGNIFICANT CHANGE UP (ref 3.8–10.5)
WBC # FLD AUTO: 8.92 K/UL — SIGNIFICANT CHANGE UP (ref 3.8–10.5)
WBC UR QL: SIGNIFICANT CHANGE UP /HPF (ref 0–5)

## 2023-04-14 PROCEDURE — 71045 X-RAY EXAM CHEST 1 VIEW: CPT | Mod: 26

## 2023-04-14 PROCEDURE — 99285 EMERGENCY DEPT VISIT HI MDM: CPT

## 2023-04-14 NOTE — ED PROVIDER NOTE - PATIENT PORTAL LINK FT
You can access the FollowMyHealth Patient Portal offered by [facility] by registering at the following website: [email or web address]. By joining [employer]’s FollowMyHealth portal, you will also be able to view your health information using other applications (apps) compatible with our system.

## 2023-04-14 NOTE — ED ADULT NURSE NOTE - OBJECTIVE STATEMENT
Break RN: Pt received to room 20 at baseline mental status, ambulatory with assistance. Pt is a 73 y/o F with PMH of HTN, DMT2, asthma, hypothyroidism, seizure disorder, Parkinson's, and schizophrenia presents to ED from nursing home with c/o aggressive behavior. Pt reports she was joking around with staff at the nursing home and was sent in for aggressive behavior. Pt also reports she has not been able to sleep well at night for the last 2 weeks. Respirations even and unlabored, skin intact, NAD. 22g IV placed, labs drawn and sent. Side rails up, safety maintained. Awaiting x-ray

## 2023-04-14 NOTE — ED BEHAVIORAL HEALTH NOTE - BEHAVIORAL HEALTH NOTE
Writer informed by resident Ben that patient is cleared for discharge Writer contacted [facility] [phone] and spoke w/ nursing supervisor alisha. Writer informed her patient is cleared for discharge. She confirmed patient can return via ambulette. Patient resides [id number] [place] [address]. Case discussed with provider.  Writer scheduled ambulette via [facility] ride trip 51A.  time 1:30AM . Spoke w/ yang.

## 2023-04-14 NOTE — ED ADULT NURSE NOTE - CHIEF COMPLAINT QUOTE
From [facility], sent for aggressive behavior towards other residents, a&o1-2 at baseline, hx of dementia, alzheimer's, HTN

## 2023-04-14 NOTE — ED PROVIDER NOTE - NSFOLLOWUPINSTRUCTIONS_ED_ALL_ED_FT
Self-Care Measures with a Chronic Disease    WHAT YOU NEED TO KNOW:    What are self-care measures? Self-care measures are steps you can take to help cope with symptoms of a chronic disease, such as arthritis, heart disease, or diabetes. Self-care measures can be used in addition to your healthcare provider's treatment and care for your disease.    What changes should I expect when I have a chronic disease? With a chronic disease, your symptoms may make it hard for you to do your normal daily activities. You also may have some of the following symptoms:    Pain: Many chronic diseases cause pain. This pain may be so bad that you have trouble doing your normal daily activities. Pain also may make it hard for you to sleep at night.    Fatigue: You may feel tired often or get tired easily.    Mood: You are at a higher risk of a mood disorder, such as depression, if you have a chronic disease.    Changes in mobility: Some chronic diseases, such as arthritis, can make it more difficult for you to get around.  What lifestyle changes should I make when I have a chronic disease? The following lifestyle changes may help you manage your symptoms and feel better:    Follow your healthcare provider's directions: Take all medicine as directed. Make sure you know how to safely use any medical devices, such as wheelchairs or oxygen tanks.    Eat a variety of healthy foods: Healthy foods include fruits, vegetables, whole-grain breads, low-fat dairy products, beans, lean meats, and fish. Several small meals throughout the day may help decrease fatigue and help you get plenty of nutrition.    Maintain a healthy weight: Ask your healthcare provider how much you should weigh. Ask him to help you create a weight loss plan if you are overweight.    Get plenty of exercise: Talk to your healthcare provider about the best exercise plan for you. Exercise can help decrease your symptoms and improve your mood and health.    Limit alcohol: Alcohol may increase your symptoms. Depending on your illness, your healthcare provider may recommend that you avoid alcohol.    Do not smoke: If you smoke, it is never too late to quit. Smoking may make symptoms of your chronic disease worse. Ask your healthcare provider for information if you need help quitting.  What tools can I use to help care for myself?You may need devices to help make your daily life easier. Feeding yourself may be easier if you use forks, knives, and spoons with bigger handles. Reaching devices may help you  things so that you do not have to bend over or stretch. Tools such as dressing sticks, shoehorns with long handles, and button hooks help you dress yourself. A sponge with a long handle may help you wash yourself. You may need a special seat for your shower or a shower that is designed for a wheelchair. You may need a lift or other machine to help you move from your bed or chair. Ask your healthcare provider for more information about these devices.    What types of therapy may help me? Therapy may help you adapt to changes caused by your disease, symptoms, and treatment. A physical therapist teaches you exercises to help improve movement and strength and to decrease pain. An occupational therapist teaches you skills to help with your daily activities.    When should I contact my healthcare provider?    Your medicine makes you feel sick.    You have new symptoms.    Your symptoms get worse.    Your pain medicine does not seem to be working.    You have questions or concerns about your condition or care.  When should I seek immediate care?    You suddenly feel weak or numb.    You suddenly cannot move an arm or leg.    You have vision loss.    You feel like hurting yourself on purpose.  CARE AGREEMENT:    You have the right to help plan your care. Learn about your health condition and how it may be treated. Discuss treatment options with your healthcare providers to decide what care you want to receive. You always have the right to refuse treatment.

## 2023-04-14 NOTE — ED PROVIDER NOTE - ATTENDING CONTRIBUTION TO CARE
I have personally performed a face to face medical and diagnostic evaluation of the patient. I have discussed with and reviewed the Resident's note and agree with the History, ROS, Physical Exam and MDM unless otherwise indicated. A brief summary of my personal evaluation and impression can be found below.    72-year-old female with past medical history of hypertension, diabetes, asthma, Parkinson's, dementia presenting from [facility] for aggressive behavior.  Patient is currently calm and cooperative, ANO timesx2 to self and location.  Unable to elaborate aggressive behavior described in triage note.  Per nursing home conversations with resident, patient was verbally abusive.  Of note patient was recently treated with  Cipro for urinary tract infection, has completed her course of antibiotics.  On exam, vital signs stable, no reproducible abdominal or chest tenderness to palpation.  No evidence of trauma to the body.  Patient well-appearing.  Given multiple comorbidities, will obtain labs to assess for infectious or metabolic etiology for acute agitation.  Possibly just worsening of chronic conditions.  Will reassess to dispo. German Steinberg, ED Attending

## 2023-04-14 NOTE — ED ADULT NURSE NOTE - NSIMPLEMENTINTERV_GEN_ALL_ED
Implemented All Fall Risk Interventions:  [place] to call system. Call bell, personal items and telephone within reach. Instruct patient to call for assistance. Room bathroom lighting operational. Non-slip footwear when patient is off stretcher. Physically safe environment: no spills, clutter or unnecessary equipment. Stretcher in lowest position, wheels locked, appropriate side rails in place. Provide visual cue, wrist band, yellow gown, etc. Monitor gait and stability. Monitor for mental status changes and reorient to person, place, and time. Review medications for side effects contributing to fall risk. Reinforce activity limits and safety measures with patient and family.

## 2023-04-14 NOTE — ED PROVIDER NOTE - CLINICAL SUMMARY MEDICAL DECISION MAKING FREE TEXT BOX
73 y/o HTN, T2DM, asthma, hypothyroidism, seizure disorder, Parkinson's disease, hypothyroidism, schizophrenia Presenting from hospital nursing home for aggressive behavior.  Patient states she was talking to the staff and joking around and was sent in for aggressive behavior.  States that she has not been able to sleep well for 14 days.  Denies any chest pain, shortness of breath, abdominal pain, nausea vomiting diarrhea.  Endorsing bilateral feet discomfort but states that is chronic in nature.  Patient is ANO x2/3. Vital signs stable, afebrile, not hypoxic. Will obtain collateral from [facility] with nursing home.  Will order basic labs, chest x-ray, fluid COVID swab rule out infectious/metabolic cause.  Disposition pending  Differential diagnosis includes but limited to infectious cause like UTI versus electrolyte derangement. 71 y/o HTN, T2DM, asthma, hypothyroidism, seizure disorder, Parkinson's disease, hypothyroidism, schizophrenia Presenting from hospital nursing home for aggressive behavior.  Patient states she was talking to the staff and joking around and was sent in for aggressive behavior.  States that she has not been able to sleep well for 14 days.  Denies any chest pain, shortness of breath, abdominal pain, nausea vomiting diarrhea.  Endorsing bilateral feet discomfort but states that is chronic in nature.  Patient is ANO x2/3. Vital signs stable, afebrile, not hypoxic. Will obtain collateral from [facility] with nursing home.  Will order basic labs, chest x-ray, fluid COVID swab rule out infectious/metabolic cause.  Disposition pending  Differential diagnosis includes but limited to infectious cause like UTI versus electrolyte derangement.    German Steinberg, ED Attending Reassessment: No actionable findings or labs and imaging.  Patient has been calm and cooperative throughout ER stay.  Social work contacted multiple times for transfer back home.  House nursing home able to take patient.  Will await callback from social work to dispo.  Plan for DC.

## 2023-04-14 NOTE — ED ADULT TRIAGE NOTE - CHIEF COMPLAINT QUOTE
From [facility], sent for aggressive behavior towards other residents, a&o1-2 at baseline, hx of dementia, alzheimer's, HTN

## 2023-04-14 NOTE — ED ADULT NURSE REASSESSMENT NOTE - NS ED NURSE REASSESS COMMENT FT1
Report received from day RNZahira. A&Ox1. Pt unable to appropriately answer questions. Shouting from room, confusion noted. Respirations even and unlabored. No acute distress noted. Appears sitting up in stretcher. Bed in lowest position, wheels locked, safety maintained. Awaiting social work.
Report received from break shift RN, pt in NAD, calm and cooperative, will continue to monitor.

## 2023-04-14 NOTE — ED PROVIDER NOTE - PROGRESS NOTE DETAILS
Wilfrido Bailey DO (PGY1)  obtained [facility]. Patient was accusing staff of doing things she was not doing and banging on the doors. Patient did not fall or hit head. Patient states that she completed a round of Cipro for UTI x1 week ago however the nurse was unable to answer what for. She states the patient has not had urinary complaints, nausea, vomiting, diarrhea, chest pain.   Spoke with nurse supervisor Tabatha.

## 2023-04-14 NOTE — ED PROVIDER NOTE - OBJECTIVE STATEMENT
73 y/o HTN, T2DM, asthma, hypothyroidism, seizure disorder, Parkinson's disease, hypothyroidism, schizophrenia Presenting from hospital nursing home for aggressive behavior.  Patient states she was talking to the staff and joking around and was sent in for aggressive behavior.  States that she has not been able to sleep well for 14 days.  Denies any chest pain, shortness of breath, abdominal pain, nausea vomiting diarrhea.  Endorsing bilateral feet discomfort but states that is chronic in nature.

## 2023-04-14 NOTE — ED PROVIDER NOTE - PHYSICAL EXAMINATION
Wilfrido Bailey DO (PGY1)   Physical Exam:    Gen: NAD, AOx2  Head: NCAT  HEENT: EOMI, PEERLA, normal conjunctiva, tongue midline, oral mucosa moist  Lung: CTAB, no respiratory distress, no wheezes/rhonchi/rales B/L  CV: RRR, no murmurs, rubs or gallops  Abd: soft, NT, ND, no guarding, no rigidity, no rebound tenderness, no CVA tenderness   MSK: no visible deformities, ROM normal in UE/LE, no back pain  Neuro: No focal sensory or motor deficits  Skin: Warm, well perfused, no rash, no leg swelling no

## 2023-04-15 LAB
CULTURE RESULTS: SIGNIFICANT CHANGE UP
SPECIMEN SOURCE: SIGNIFICANT CHANGE UP

## 2023-08-08 NOTE — ED BEHAVIORAL HEALTH ASSESSMENT NOTE - NS ED BHA MSE GENERAL APPEARANCE
Patient would like communication of their results via:      Home Phone: [phone] (home)  Okay to leave a message containing results? Yes          CC:  Paola Jason is here today for Arm Pain (Arm pain, very painful hard to lift up her arm without pain, right foot pain and swelling)       Medications have been reviewed and updated    Patient preferred phone number: [phone]  Ok to leave detailed message on voicemail    Health Maintenance Due   Topic Date Due   • Diabetes Eye Exam  Never done   • Shingles Vaccine (1 of 2) Never done   • Colorectal Cancer Screen-  03/13/2019   • Breast Cancer Screening  03/13/2020   • COVID-19 Vaccine (3 - Moderna series) 11/05/2021   • DTaP/Tdap/Td Vaccine (2 - Td or Tdap) 09/18/2022   • Pneumococcal Vaccine 65+ (2 - PCV) 03/11/2023   • Diabetes A1C  09/28/2023       Patient is due for topics as listed above but is not proceeding with Immunization(s) COVID-19, Dtap/Tdap/Td, Pneumococcal and Shingles, Colorectal Cancer Screening: Colonoscopy, Diabetes A1C and Diabetes Eye Exam at this time.              Well developed

## 2023-08-09 NOTE — BH INPATIENT PSYCHIATRY ASSESSMENT NOTE - CURRENT ACTIVE IDEATION
[facility] RADIATION ONCOLOGY  [address]  [address]  [facility] [address]  Dept Phone: [phone]    Radiation Oncology Follow-Up    Patient Name:  Jose Escudero  YOB: 1947  MRN:  [id number]  Account Number:  [id number]  Referring Physician:  No ref. provider found  Dictating Physician:  Alex Tran MD    Diagnosis:  Lung cancer (CMD) C34.90    Cancer Staging   Lung cancer (CMD)  Staging form: Lung, AJCC 8th Edition  - Clinical stage from 4/6/2023: cT1, cN0, cM0 - Signed by Caleb Dover MD on 4/6/2023    Radiation Treatments     Active   No active radiation treatments to show.   Historical   right lung mass (Started on 4/26/2023)   Most recent fraction: 750 cGy given on 5/5/2023   Total given: 6,000 cGy / 6,000 cGy  (8 of 8 fractions)   Elapsed Days: 9   Technique: VMAT                  History of Present Illness:    Jose Escudero is a 76 year old past medical history significant for obstructive sleep apnea, paranoid schizophrenia, hypertension, hypothyroidism, seizure disorder, type 2 diabetes and osteopenia.      Patient living at the [facility] community for the past 23 years,  previous smoker quit about 7 years ago.  Found to have a lesion,   Right lung, on his CT chest screening study in October 22.  Subsequent follow-up visits and CT scan prompted bronchoscopy and biopsy of the right upper lobe lesion.  Biopsies 2/13/2023 positive for squamous cell carcinoma     Patient was seen by medical oncology who felt his performance status was for therefore not a surgical candidate.  Patient was underwent completion staging with a brain MRI a PET CT scan which demonstrated a 1.7 cm right upper lobe lung nodule SUV of 14 no other uptake elsewhere including mediastinum or hilar regions.  Clinically therefore T1b N0 M0 squamous cell carcinoma right upper lobe.  PDL 1 was negative.  Patient referred for definitive radiation therapy.    Interval History:   (8/9/2023 ):  Restaging PET/CT was performed on June 19, 2023.  There appeared to be decreased size as well as FDG activity of the treated right upper lobe nodular opacity.  There is a mild FDG avid right hilar lymph node which was new.  Otherwise he has stable breathing and no new complaints related to the treated region.    Past Medical History:   Diagnosis Date   • DM2 (diabetes mellitus, type 2) (CMD)    • GERD (gastroesophageal reflux disease)    • HTN (hypertension)    • Hypothyroidism    • Lung cancer (CMD) 02/10/2023    SCC RUL   • BLAINE (obstructive sleep apnea)    • Osteopenia    • Paranoid schizophrenia (CMD)    • Seizure disorder (CMD)        Past Surgical History:   Procedure Laterality Date   • Bronchoscopy,bx bronch/endobro Right 02/10/2023    RUL SCC        Current Outpatient Medications   Medication Sig Dispense Refill   • acetaminophen (TYLENOL) 650 MG CR tablet Take 650 mg by mouth every 8 hours as needed for Pain.     • IPRATROPIUM-ALBUTEROL IN      • amLODIPine (NORVASC) 5 MG tablet Take 5 mg by mouth daily. Hold per parameters     • Calcium Carb-Cholecalciferol (Calcium 500 + D) 500-3.125 MG-MCG Tab      • Cholecalciferol 75 mcg (3,000 units) tablet      • docusate sodium (COLACE) 100 MG capsule Take 100 mg by mouth 2 times daily as needed for Constipation.     • flunisolide 25 MCG/ACT (0.025%) nasal spray Spray in each nostril 2 (two) times a day.     • guaiFENesin 100 MG/5ML      • hydrALAZINE (APRESOLINE) 25 MG tablet Take 25 mg by mouth in the morning and 25 mg at noon and 25 mg in the evening. Hold per parameters     • Emollient (AQUAPHOR ADV THERAPY HEALING EX)      • levothyroxine 112 MCG tablet Take 112 mcg by mouth daily.     • lidocaine (LIDODERM) 5 % patch Place 1 patch onto the skin every 24 hours. Remove patch 12 hours after applying     • lisinopril (ZESTRIL) 40 MG tablet Take 40 mg by mouth daily. Hold per parameters     • MAGNESIUM OXIDE PO      • metformin (GLUCOPHAGE) 1000 MG tablet Take  1,000 mg by mouth in the morning and 1,000 mg in the evening. Take with meals.     • METOPROLOL SUCCINATE PO Take 12.5 mg by mouth. Hold per parameters     • miconazole (MITRAZOLE) 2 % cream Apply topically 2 times daily.     • naftifine (NAFTIN) 1 % cream Apply topically daily.     • omeprazole (PrilOSEC) 20 MG capsule Take 20 mg by mouth daily.     • risperiDONE (RisperDAL) 1 MG tablet Take 1 mg by mouth daily.     • risperiDONE (RisperDAL M-TABS) 4 MG disintegrating tablet Take 4 mg by mouth in the morning and 4 mg in the evening.     • traZODone (DESYREL) 50 MG tablet Take 50 mg by mouth nightly.     • tretinoin (RETIN-A) 0.01 % gel Apply topically nightly.     • Valproate Sodium (VALPROIC ACID PO) Take 2,000 mg by mouth at bedtime.       No current facility-administered medications for this visit.       Allergies as of 08/09/2023 - Reviewed 05/01/2023   Allergen Reaction Noted   • Strawberry   (food or med) Other (See Comments) 04/06/2023       Social History:   Tobacco Use: Jose Escudero  reports that he quit smoking about 3 years ago. His smoking use included cigarettes. He started smoking about 59 years ago. He has a 13.9 pack-year smoking history. He has never used smokeless tobacco.   Alcohol Use: Jose Escudero  reports that he does not currently use alcohol.   Drug Use: Jose Escudero  reports no history of drug use.  Resides In: [address] [place], [place] [id number]  Contact Information:  [phone] (home) [phone] (work)    No family history on file.     Review of Systems:    Review of Systems   Constitutional: Positive for fatigue. Negative for appetite change.   HENT:   Negative for mouth sores, sore throat and trouble swallowing.    Respiratory: Positive for cough. Negative for chest tightness, shortness of breath and wheezing.         Pt reports stable productive, cough   Gastrointestinal: Positive for abdominal pain and diarrhea. Negative for constipation and  vomiting.   Genitourinary: Positive for bladder incontinence. Negative for dysuria.    Musculoskeletal: Positive for arthralgias and back pain. Negative for neck pain.   Neurological: Positive for dizziness and headaches. Negative for extremity weakness and speech difficulty.              There were no vitals filed for this visit.   There is no height or weight on file to calculate BMI.     Physical Exam  Vitals and nursing note reviewed.   Constitutional:       General: He is not in acute distress.     Appearance: He is not ill-appearing.      Comments: Pleasant, mildly confused gentleman resting comfortably in his wheelchair.   Cardiovascular:      Rate and Rhythm: Normal rate and regular rhythm.   Pulmonary:      Breath sounds: No stridor. No wheezing, rhonchi or rales.      Comments: Distant breath sounds bilaterally.  Skin:     General: Skin is warm and dry.          Impression:    Jose is an adult male status post her tactic radiation for non-small cell carcinoma, PD-L1 negative, of the lung    Plan:   1. Stage I, squamous cell cancer of the lung: Overall the patient is stable with excellent response to the treated region.  However, his PET CT recently done at an outside institution suggested mild FDG avidity of a hilar lymph node ipsilaterally.  He has a short interval PET/CT as ordered by his medical oncologist.  We will await results of that test.      This note was created using the Dragon voice recognition system. Errors in content may be related to improper recognition of the system. Effort to review and correct the note has been made but irregularities may still be present. All appropriate tests and scans were reviewed with the patient at the time of this visit.    Note to patient: The 21st Century Cures Act makes medical notes like these available to patients in the interest of transparency. However, be advised this is a medical document. It is intended as peer to peer communication. It is written  in medical language and may contain abbreviations or verbiage that are unfamiliar. It may appear blunt or direct. Medical documents are intended to carry relevant information, facts as evident, and the clinical opinion of the practitioner.   No

## 2023-09-13 NOTE — CONSULT NOTE ADULT - CRITICAL CARE SERVICES PROVIDED
Can schedule clinic next available  Order barium esophagram, diagnosis atypical chest pain  '  Be sure to follow up PCP as well for eval if not done already, I don't have access to their notes   Critical care services provided

## 2024-01-05 NOTE — BH CONSULTATION LIAISON ASSESSMENT NOTE - NSBHATTENDATTEST_PSY_ALL_CORE
Attending Discharge Physical Examination: I have personally seen, examined and participated in the care of this patient. I have reviewed all pertinent clinical information, including history, physical exam, plan and the Medical/PA/NP Student’s note and agree except as noted.

## 2024-01-11 ENCOUNTER — INPATIENT (INPATIENT)
Facility: HOSPITAL | Age: 74
LOS: 5 days | Discharge: PSYCHIATRIC OR SUBSTANCE USE FACILITY | End: 2024-01-17
Attending: INTERNAL MEDICINE | Admitting: INTERNAL MEDICINE
Payer: MEDICARE

## 2024-01-11 VITALS
SYSTOLIC BLOOD PRESSURE: 104 MMHG | HEART RATE: 80 BPM | RESPIRATION RATE: 18 BRPM | TEMPERATURE: 98 F | OXYGEN SATURATION: 95 % | DIASTOLIC BLOOD PRESSURE: 71 MMHG

## 2024-01-11 DIAGNOSIS — R45.1 RESTLESSNESS AND AGITATION: ICD-10-CM

## 2024-01-11 LAB
ALBUMIN SERPL ELPH-MCNC: 3.6 G/DL — SIGNIFICANT CHANGE UP (ref 3.3–5)
ALBUMIN SERPL ELPH-MCNC: 3.6 G/DL — SIGNIFICANT CHANGE UP (ref 3.3–5)
ALP SERPL-CCNC: 111 U/L — SIGNIFICANT CHANGE UP (ref 40–120)
ALP SERPL-CCNC: 111 U/L — SIGNIFICANT CHANGE UP (ref 40–120)
ALT FLD-CCNC: 8 U/L — SIGNIFICANT CHANGE UP (ref 4–33)
ALT FLD-CCNC: 8 U/L — SIGNIFICANT CHANGE UP (ref 4–33)
ANION GAP SERPL CALC-SCNC: 10 MMOL/L — SIGNIFICANT CHANGE UP (ref 7–14)
ANION GAP SERPL CALC-SCNC: 10 MMOL/L — SIGNIFICANT CHANGE UP (ref 7–14)
APPEARANCE UR: CLEAR — SIGNIFICANT CHANGE UP
APPEARANCE UR: CLEAR — SIGNIFICANT CHANGE UP
AST SERPL-CCNC: 42 U/L — HIGH (ref 4–32)
AST SERPL-CCNC: 42 U/L — HIGH (ref 4–32)
BASOPHILS # BLD AUTO: 0.03 K/UL — SIGNIFICANT CHANGE UP (ref 0–0.2)
BASOPHILS # BLD AUTO: 0.03 K/UL — SIGNIFICANT CHANGE UP (ref 0–0.2)
BASOPHILS NFR BLD AUTO: 0.7 % — SIGNIFICANT CHANGE UP (ref 0–2)
BASOPHILS NFR BLD AUTO: 0.7 % — SIGNIFICANT CHANGE UP (ref 0–2)
BILIRUB SERPL-MCNC: 0.2 MG/DL — SIGNIFICANT CHANGE UP (ref 0.2–1.2)
BILIRUB SERPL-MCNC: 0.2 MG/DL — SIGNIFICANT CHANGE UP (ref 0.2–1.2)
BILIRUB UR-MCNC: NEGATIVE — SIGNIFICANT CHANGE UP
BILIRUB UR-MCNC: NEGATIVE — SIGNIFICANT CHANGE UP
BUN SERPL-MCNC: 23 MG/DL — SIGNIFICANT CHANGE UP (ref 7–23)
BUN SERPL-MCNC: 23 MG/DL — SIGNIFICANT CHANGE UP (ref 7–23)
CALCIUM SERPL-MCNC: 9.1 MG/DL — SIGNIFICANT CHANGE UP (ref 8.4–10.5)
CALCIUM SERPL-MCNC: 9.1 MG/DL — SIGNIFICANT CHANGE UP (ref 8.4–10.5)
CHLORIDE SERPL-SCNC: 108 MMOL/L — HIGH (ref 98–107)
CHLORIDE SERPL-SCNC: 108 MMOL/L — HIGH (ref 98–107)
CO2 SERPL-SCNC: 26 MMOL/L — SIGNIFICANT CHANGE UP (ref 22–31)
CO2 SERPL-SCNC: 26 MMOL/L — SIGNIFICANT CHANGE UP (ref 22–31)
COLOR SPEC: YELLOW — SIGNIFICANT CHANGE UP
COLOR SPEC: YELLOW — SIGNIFICANT CHANGE UP
CREAT SERPL-MCNC: 1.46 MG/DL — HIGH (ref 0.5–1.3)
CREAT SERPL-MCNC: 1.46 MG/DL — HIGH (ref 0.5–1.3)
DIFF PNL FLD: NEGATIVE — SIGNIFICANT CHANGE UP
DIFF PNL FLD: NEGATIVE — SIGNIFICANT CHANGE UP
EGFR: 38 ML/MIN/1.73M2 — LOW
EGFR: 38 ML/MIN/1.73M2 — LOW
EOSINOPHIL # BLD AUTO: 0.26 K/UL — SIGNIFICANT CHANGE UP (ref 0–0.5)
EOSINOPHIL # BLD AUTO: 0.26 K/UL — SIGNIFICANT CHANGE UP (ref 0–0.5)
EOSINOPHIL NFR BLD AUTO: 5.9 % — SIGNIFICANT CHANGE UP (ref 0–6)
EOSINOPHIL NFR BLD AUTO: 5.9 % — SIGNIFICANT CHANGE UP (ref 0–6)
GLUCOSE BLDC GLUCOMTR-MCNC: 142 MG/DL — HIGH (ref 70–99)
GLUCOSE BLDC GLUCOMTR-MCNC: 142 MG/DL — HIGH (ref 70–99)
GLUCOSE SERPL-MCNC: 81 MG/DL — SIGNIFICANT CHANGE UP (ref 70–99)
GLUCOSE SERPL-MCNC: 81 MG/DL — SIGNIFICANT CHANGE UP (ref 70–99)
GLUCOSE UR QL: NEGATIVE MG/DL — SIGNIFICANT CHANGE UP
GLUCOSE UR QL: NEGATIVE MG/DL — SIGNIFICANT CHANGE UP
HCT VFR BLD CALC: 32.9 % — LOW (ref 34.5–45)
HCT VFR BLD CALC: 32.9 % — LOW (ref 34.5–45)
HGB BLD-MCNC: 10.5 G/DL — LOW (ref 11.5–15.5)
HGB BLD-MCNC: 10.5 G/DL — LOW (ref 11.5–15.5)
IANC: 2.55 K/UL — SIGNIFICANT CHANGE UP (ref 1.8–7.4)
IANC: 2.55 K/UL — SIGNIFICANT CHANGE UP (ref 1.8–7.4)
IMM GRANULOCYTES NFR BLD AUTO: 0.5 % — SIGNIFICANT CHANGE UP (ref 0–0.9)
IMM GRANULOCYTES NFR BLD AUTO: 0.5 % — SIGNIFICANT CHANGE UP (ref 0–0.9)
KETONES UR-MCNC: NEGATIVE MG/DL — SIGNIFICANT CHANGE UP
KETONES UR-MCNC: NEGATIVE MG/DL — SIGNIFICANT CHANGE UP
LEUKOCYTE ESTERASE UR-ACNC: ABNORMAL
LEUKOCYTE ESTERASE UR-ACNC: ABNORMAL
LYMPHOCYTES # BLD AUTO: 1 K/UL — SIGNIFICANT CHANGE UP (ref 1–3.3)
LYMPHOCYTES # BLD AUTO: 1 K/UL — SIGNIFICANT CHANGE UP (ref 1–3.3)
LYMPHOCYTES # BLD AUTO: 22.7 % — SIGNIFICANT CHANGE UP (ref 13–44)
LYMPHOCYTES # BLD AUTO: 22.7 % — SIGNIFICANT CHANGE UP (ref 13–44)
MCHC RBC-ENTMCNC: 26.9 PG — LOW (ref 27–34)
MCHC RBC-ENTMCNC: 26.9 PG — LOW (ref 27–34)
MCHC RBC-ENTMCNC: 31.9 GM/DL — LOW (ref 32–36)
MCHC RBC-ENTMCNC: 31.9 GM/DL — LOW (ref 32–36)
MCV RBC AUTO: 84.4 FL — SIGNIFICANT CHANGE UP (ref 80–100)
MCV RBC AUTO: 84.4 FL — SIGNIFICANT CHANGE UP (ref 80–100)
MONOCYTES # BLD AUTO: 0.54 K/UL — SIGNIFICANT CHANGE UP (ref 0–0.9)
MONOCYTES # BLD AUTO: 0.54 K/UL — SIGNIFICANT CHANGE UP (ref 0–0.9)
MONOCYTES NFR BLD AUTO: 12.3 % — SIGNIFICANT CHANGE UP (ref 2–14)
MONOCYTES NFR BLD AUTO: 12.3 % — SIGNIFICANT CHANGE UP (ref 2–14)
NEUTROPHILS # BLD AUTO: 2.55 K/UL — SIGNIFICANT CHANGE UP (ref 1.8–7.4)
NEUTROPHILS # BLD AUTO: 2.55 K/UL — SIGNIFICANT CHANGE UP (ref 1.8–7.4)
NEUTROPHILS NFR BLD AUTO: 57.9 % — SIGNIFICANT CHANGE UP (ref 43–77)
NEUTROPHILS NFR BLD AUTO: 57.9 % — SIGNIFICANT CHANGE UP (ref 43–77)
NITRITE UR-MCNC: NEGATIVE — SIGNIFICANT CHANGE UP
NITRITE UR-MCNC: NEGATIVE — SIGNIFICANT CHANGE UP
NRBC # BLD: 0 /100 WBCS — SIGNIFICANT CHANGE UP (ref 0–0)
NRBC # BLD: 0 /100 WBCS — SIGNIFICANT CHANGE UP (ref 0–0)
NRBC # FLD: 0 K/UL — SIGNIFICANT CHANGE UP (ref 0–0)
NRBC # FLD: 0 K/UL — SIGNIFICANT CHANGE UP (ref 0–0)
PH UR: 7 — SIGNIFICANT CHANGE UP (ref 5–8)
PH UR: 7 — SIGNIFICANT CHANGE UP (ref 5–8)
PLATELET # BLD AUTO: 194 K/UL — SIGNIFICANT CHANGE UP (ref 150–400)
PLATELET # BLD AUTO: 194 K/UL — SIGNIFICANT CHANGE UP (ref 150–400)
POTASSIUM SERPL-MCNC: 3.8 MMOL/L — SIGNIFICANT CHANGE UP (ref 3.5–5.3)
POTASSIUM SERPL-MCNC: 3.8 MMOL/L — SIGNIFICANT CHANGE UP (ref 3.5–5.3)
POTASSIUM SERPL-SCNC: 3.8 MMOL/L — SIGNIFICANT CHANGE UP (ref 3.5–5.3)
POTASSIUM SERPL-SCNC: 3.8 MMOL/L — SIGNIFICANT CHANGE UP (ref 3.5–5.3)
PROT SERPL-MCNC: 7.7 G/DL — SIGNIFICANT CHANGE UP (ref 6–8.3)
PROT SERPL-MCNC: 7.7 G/DL — SIGNIFICANT CHANGE UP (ref 6–8.3)
PROT UR-MCNC: NEGATIVE MG/DL — SIGNIFICANT CHANGE UP
PROT UR-MCNC: NEGATIVE MG/DL — SIGNIFICANT CHANGE UP
RBC # BLD: 3.9 M/UL — SIGNIFICANT CHANGE UP (ref 3.8–5.2)
RBC # BLD: 3.9 M/UL — SIGNIFICANT CHANGE UP (ref 3.8–5.2)
RBC # FLD: 15.3 % — HIGH (ref 10.3–14.5)
RBC # FLD: 15.3 % — HIGH (ref 10.3–14.5)
SODIUM SERPL-SCNC: 144 MMOL/L — SIGNIFICANT CHANGE UP (ref 135–145)
SODIUM SERPL-SCNC: 144 MMOL/L — SIGNIFICANT CHANGE UP (ref 135–145)
SP GR SPEC: 1.01 — SIGNIFICANT CHANGE UP (ref 1–1.03)
SP GR SPEC: 1.01 — SIGNIFICANT CHANGE UP (ref 1–1.03)
TOXICOLOGY SCREEN, DRUGS OF ABUSE, SERUM RESULT: SIGNIFICANT CHANGE UP
TOXICOLOGY SCREEN, DRUGS OF ABUSE, SERUM RESULT: SIGNIFICANT CHANGE UP
TSH SERPL-MCNC: 4.18 UIU/ML — SIGNIFICANT CHANGE UP (ref 0.27–4.2)
TSH SERPL-MCNC: 4.18 UIU/ML — SIGNIFICANT CHANGE UP (ref 0.27–4.2)
UROBILINOGEN FLD QL: 1 MG/DL — SIGNIFICANT CHANGE UP (ref 0.2–1)
UROBILINOGEN FLD QL: 1 MG/DL — SIGNIFICANT CHANGE UP (ref 0.2–1)
WBC # BLD: 4.4 K/UL — SIGNIFICANT CHANGE UP (ref 3.8–10.5)
WBC # BLD: 4.4 K/UL — SIGNIFICANT CHANGE UP (ref 3.8–10.5)
WBC # FLD AUTO: 4.4 K/UL — SIGNIFICANT CHANGE UP (ref 3.8–10.5)
WBC # FLD AUTO: 4.4 K/UL — SIGNIFICANT CHANGE UP (ref 3.8–10.5)

## 2024-01-11 PROCEDURE — 99223 1ST HOSP IP/OBS HIGH 75: CPT

## 2024-01-11 PROCEDURE — 99285 EMERGENCY DEPT VISIT HI MDM: CPT | Mod: GC

## 2024-01-11 PROCEDURE — G0316 PROLONG INPT EVAL ADD15 M: CPT

## 2024-01-11 PROCEDURE — 70450 CT HEAD/BRAIN W/O DYE: CPT | Mod: 26,MA

## 2024-01-11 PROCEDURE — 93010 ELECTROCARDIOGRAM REPORT: CPT

## 2024-01-11 RX ORDER — GLUCAGON INJECTION, SOLUTION 0.5 MG/.1ML
1 INJECTION, SOLUTION SUBCUTANEOUS ONCE
Refills: 0 | Status: DISCONTINUED | OUTPATIENT
Start: 2024-01-11 | End: 2024-01-17

## 2024-01-11 RX ORDER — SODIUM CHLORIDE 9 MG/ML
1000 INJECTION, SOLUTION INTRAVENOUS
Refills: 0 | Status: DISCONTINUED | OUTPATIENT
Start: 2024-01-11 | End: 2024-01-17

## 2024-01-11 RX ORDER — DEXTROSE 50 % IN WATER 50 %
12.5 SYRINGE (ML) INTRAVENOUS ONCE
Refills: 0 | Status: DISCONTINUED | OUTPATIENT
Start: 2024-01-11 | End: 2024-01-17

## 2024-01-11 RX ORDER — DIPHENHYDRAMINE HCL 50 MG
50 CAPSULE ORAL ONCE
Refills: 0 | Status: COMPLETED | OUTPATIENT
Start: 2024-01-11 | End: 2024-01-11

## 2024-01-11 RX ORDER — HALOPERIDOL DECANOATE 100 MG/ML
5 INJECTION INTRAMUSCULAR ONCE
Refills: 0 | Status: DISCONTINUED | OUTPATIENT
Start: 2024-01-11 | End: 2024-01-11

## 2024-01-11 RX ORDER — DEXTROSE 50 % IN WATER 50 %
15 SYRINGE (ML) INTRAVENOUS ONCE
Refills: 0 | Status: DISCONTINUED | OUTPATIENT
Start: 2024-01-11 | End: 2024-01-17

## 2024-01-11 RX ORDER — HALOPERIDOL DECANOATE 100 MG/ML
5 INJECTION INTRAMUSCULAR ONCE
Refills: 0 | Status: COMPLETED | OUTPATIENT
Start: 2024-01-11 | End: 2024-01-11

## 2024-01-11 RX ORDER — CEFTRIAXONE 500 MG/1
1000 INJECTION, POWDER, FOR SOLUTION INTRAMUSCULAR; INTRAVENOUS ONCE
Refills: 0 | Status: COMPLETED | OUTPATIENT
Start: 2024-01-11 | End: 2024-01-11

## 2024-01-11 RX ORDER — DEXTROSE 50 % IN WATER 50 %
25 SYRINGE (ML) INTRAVENOUS ONCE
Refills: 0 | Status: DISCONTINUED | OUTPATIENT
Start: 2024-01-11 | End: 2024-01-17

## 2024-01-11 RX ORDER — LISINOPRIL 2.5 MG/1
20 TABLET ORAL DAILY
Refills: 0 | Status: DISCONTINUED | OUTPATIENT
Start: 2024-01-11 | End: 2024-01-17

## 2024-01-11 RX ORDER — LEVOTHYROXINE SODIUM 125 MCG
75 TABLET ORAL ONCE
Refills: 0 | Status: COMPLETED | OUTPATIENT
Start: 2024-01-11 | End: 2024-01-11

## 2024-01-11 RX ORDER — SODIUM CHLORIDE 9 MG/ML
1000 INJECTION INTRAMUSCULAR; INTRAVENOUS; SUBCUTANEOUS ONCE
Refills: 0 | Status: COMPLETED | OUTPATIENT
Start: 2024-01-11 | End: 2024-01-11

## 2024-01-11 RX ORDER — INSULIN LISPRO 100/ML
VIAL (ML) SUBCUTANEOUS
Refills: 0 | Status: DISCONTINUED | OUTPATIENT
Start: 2024-01-11 | End: 2024-01-17

## 2024-01-11 RX ORDER — DIVALPROEX SODIUM 500 MG/1
500 TABLET, DELAYED RELEASE ORAL ONCE
Refills: 0 | Status: COMPLETED | OUTPATIENT
Start: 2024-01-11 | End: 2024-01-11

## 2024-01-11 RX ORDER — OLANZAPINE 15 MG/1
2.5 TABLET, FILM COATED ORAL ONCE
Refills: 0 | Status: COMPLETED | OUTPATIENT
Start: 2024-01-11 | End: 2024-01-11

## 2024-01-11 RX ORDER — CARBIDOPA AND LEVODOPA 25; 100 MG/1; MG/1
1 TABLET ORAL ONCE
Refills: 0 | Status: COMPLETED | OUTPATIENT
Start: 2024-01-11 | End: 2024-01-11

## 2024-01-11 RX ADMIN — Medication 1 TABLET(S): at 12:58

## 2024-01-11 RX ADMIN — HALOPERIDOL DECANOATE 5 MILLIGRAM(S): 100 INJECTION INTRAMUSCULAR at 07:50

## 2024-01-11 RX ADMIN — OLANZAPINE 2.5 MILLIGRAM(S): 15 TABLET, FILM COATED ORAL at 04:45

## 2024-01-11 RX ADMIN — HALOPERIDOL DECANOATE 5 MILLIGRAM(S): 100 INJECTION INTRAMUSCULAR at 05:15

## 2024-01-11 RX ADMIN — Medication 2 MILLIGRAM(S): at 16:26

## 2024-01-11 RX ADMIN — Medication 2 MILLIGRAM(S): at 08:33

## 2024-01-11 RX ADMIN — SODIUM CHLORIDE 1000 MILLILITER(S): 9 INJECTION INTRAMUSCULAR; INTRAVENOUS; SUBCUTANEOUS at 17:54

## 2024-01-11 RX ADMIN — Medication 50 MILLIGRAM(S): at 05:15

## 2024-01-11 RX ADMIN — LISINOPRIL 20 MILLIGRAM(S): 2.5 TABLET ORAL at 10:52

## 2024-01-11 RX ADMIN — Medication 75 MICROGRAM(S): at 10:52

## 2024-01-11 RX ADMIN — DIVALPROEX SODIUM 500 MILLIGRAM(S): 500 TABLET, DELAYED RELEASE ORAL at 10:52

## 2024-01-11 NOTE — ED ADULT TRIAGE NOTE - CHIEF COMPLAINT QUOTE
Pt arrives from [facility] for report of agitation since "earlier today".  Pt is yelling at EMS and staff using racial slurs and threatening staff with violence.  Pt denies physical complaints.  EMS reports hx of hypothyroidism, DM, HTN, dementia, parkinson's, HLD, Schizophrenia,  Unable to complete fs in triage due to pt refusal to keep still while attempting.   Dr. Charles assessing pt in triage. Pt arrives from [facility] for report of agitation since "earlier today".  Pt is yelling at EMS and staff using racial slurs and threatening staff with violence.  Pt denies physical complaints.  EMS reports hx of hypothyroidism, DM, HTN, dementia, parkinson's, HLD, Schizophrenia,  Unable to complete fs in triage due to pt refusal to keep still while attempting.   Dr. Charles assessing pt in triage.

## 2024-01-11 NOTE — ED PROVIDER NOTE - PROGRESS NOTE DETAILS
CHINO Johnson PGY-2: Patient requiring multiple rounds of Haldol, still not compliant with giving blood.  Will trial Ativan at this time, end-tidal CO2.  Neck step would be IM ketamine pending response and ability for blood work CHINO Johnson PGY-2: Patient requiring multiple rounds of Haldol, still not compliant with giving blood.  Will trial Ativan at this time, end-tidal CO2.  Next step would be IM ketamine pending response and ability for blood work

## 2024-01-11 NOTE — H&P ADULT - PROBLEM SELECTOR PLAN 2
-continue valproic acid 250 mg Q8hr and olanzapine PRN (home med continued)   -psych consult as above

## 2024-01-11 NOTE — ED ADULT NURSE REASSESSMENT NOTE - NS ED NURSE REASSESS COMMENT FT1
Pt received in on acute distress, A&Ox2  person and place, pt refusing to having blood work and radiology exams performed. Pt given IM Ativan for radiology and blood work to be performed. Pt able to having CT scan performed and is able to be redirected after medication. Pt placed on continuous cardiac monitor, pulse ox, and Endtidal CO2 with normal levels. Will continue to monitor.

## 2024-01-11 NOTE — ED PROVIDER NOTE - OBJECTIVE STATEMENT
72 yo F hx of HTN, T2DM, HLD, asthma, hypothyroidism, seizure disorder, Parkinson's disease, and schizophrenia presenting from [facility] for agitation/aggressive behavior. 74 yo F hx of HTN, T2DM, HLD, asthma, hypothyroidism, seizure disorder, Parkinson's disease, and schizophrenia presenting from [facility] for agitation/aggressive behavior. 74 yo F hx of HTN, T2DM, HLD, asthma, hypothyroidism, seizure disorder, Parkinson's disease, dementia, and schizophrenia presenting from [facility] for agitation/aggressive behavior since Monday. Unable to obtain history from patient due to agitation, patient repeating herself, and medical intervention. Patient was agitated during transport and in triage, yelling racial slurs, threatening staff, and being combative.    Spoke with [facility]. Patient was sent in for agitation/agression since Monday. MD at facility wanted her to undergo "a psych evaluation." Patient was combative, yelling, and refusing meds at the facility.     In triage, patient received haldol 5, zyprexa 2.5, and benadryl 50 due to agitation and aggression. 72 yo F hx of HTN, T2DM, HLD, asthma, hypothyroidism, seizure disorder, Parkinson's disease, dementia, and schizophrenia presenting from [facility] for agitation/aggressive behavior since Monday. Unable to obtain history from patient due to agitation, patient repeating herself, and medical intervention. Patient was agitated during transport and in triage, yelling racial slurs, threatening staff, and being combative.    Spoke with [facility]. Patient was sent in for agitation/agression since Monday. MD at facility wanted her to undergo "a psych evaluation." Patient was combative, yelling, and refusing meds at the facility.     In triage, patient received haldol 5, zyprexa 2.5, and benadryl 50 due to agitation and aggression.

## 2024-01-11 NOTE — H&P ADULT - NSHPLABSRESULTS_GEN_ALL_CORE
10.5   4.40  )-----------( 194      ( 2024 09:38 )             32.9     144  |  108<H>  |  23  ----------------------------<  81       3.8   |  26  |  1.46<H>    Ca    9.1      2024 09:38    TPro  7.7  /  Alb  3.6  /  TBili  0.2  /  DBili  x   /  AST  42<H>  /  ALT  8   /  AlkPhos  111                              Urinalysis Basic - ( 2024 09:56 )  Color: Yellow / Appearance: Clear / S.013 / pH: 7.0  Gluc: Negative mg/dL / Ketone: Negative mg/dL  / Bili: Negative / Urobili: 1.0 mg/dL   Blood: Negative / Protein: Negative mg/dL / Nitrite: Negative   Leuk Esterase: Large / RBC: 6 /HPF / WBC 3 /HPF   Sq Epi: x / Non Sq Epi: x / Bacteria: Negative /HPF

## 2024-01-11 NOTE — H&P ADULT - PROBLEM SELECTOR PLAN 4
-Cr 1.46, last Cr 0.9 (at [facility] Cr 0.9 on 04/2023)  -continue to monitor BMP daily   -monitor intake and output every 4 hours   -UA with 6 RBC, urine Na-pending   -Renal US to r/o obstruction/ hydronephrosis  -renal consult in the AM -Cr 1.46, last Cr 0.9 (at [facility] Cr 0.9 on 04/2023)  -continue to monitor BMP daily   -monitor intake and output every 4 hours   -UA with 6 RBC, urine Na-pending   -Renal US to r/o obstruction/ hydronephrosis  -renal consult in the AM

## 2024-01-11 NOTE — H&P ADULT - ASSESSMENT
Ms. Azul is a 73 year old F with history of vascular dementia, Parkinson's, retinopathy with macular edema, asthma, seizure, hypothyroidism, HLD, GERD, MDD, MI, malignant neoplasm of bladder, anxiety, hydrocephalus, OA, schizophrenia, HTN complicated by with retinopathy who presents from NH ([facility]) for evaluation of agitation (for 4 days per NH).  Ms. Azul is a 73 year old F with history of vascular dementia, Parkinson's, retinopathy with macular edema, asthma, seizure, hypothyroidism, HLD, GERD, MDD, MI, malignant neoplasm of bladder, anxiety, hydrocephalus, OA, schizophrenia, HTN complicated by with retinopathy who presents from NH ([facility]) for evaluation of agitation (for 4 days per NH).

## 2024-01-11 NOTE — ED PROVIDER NOTE - ATTENDING CONTRIBUTION TO CARE
HPI: 72 yo F hx of HTN, T2DM, HLD, asthma, hypothyroidism, seizure disorder, Parkinson's disease, dementia, and schizophrenia presenting from [facility] for agitation/aggressive behavior since Monday (4 days prior to arrival). Unable to obtain history from patient due to agitation, patient repeating herself, and medical intervention. Patient was agitated during transport and in triage, yelling racial slurs, threatening staff, and being combative. Pt required sedation prior to coming to main ED but still awake and yelling at staff.   Spoke with [facility]. Patient was sent in for agitation/agression since Monday. MD at facility wanted her to undergo "a psych evaluation." Patient was combative, yelling, and refusing meds at the facility.     EXAM: Patient is awake and yelling at staff and threatening staff physically.  Not able to obtain adequate physical exam but grossly patient has no musculoskeletal deformities or open wounds.  She is yelling full sentences so airway appears intact.  Does not appear to be tachypneic or in acute distress or short of breath.    MDM: 73-year-old female with history of hypertension, type 2 diabetes, hyperlipidemia, asthma, hypothyroidism, seizure disorder, Parkinson disease, dementia and schizophrenia sent from [facility] by EMS for agitation/aggression towards staff since Monday and refusing to take medications.  Patient is aggressive and agitated here and repeating himself and screaming at staff and threatening staff.  She required sedation in the triage area prior to coming to the main ED given her condition.  Spoke to [place] with [facility] representative and she is unsure why she was sent here although states that MD wanted patient to go undergo a psych eval although on the paperwork states that the NP needed patient to be evaluated.  Patient is unable to be evaluated adequately as she is not providing additional history.  States that she has psych medications on board but might not have been given it given her aggressive nature.  At this time patient is sedated and getting drowsy.  When patient is sedated adequately will obtain labs and imaging including CT head to rule out cause of mental status change acutely.  Will rule out infection.  If all is normal may try to send patient back to [facility] to have outpatient eval but this is most likely related to her chronic condition with acute decompensation secondary to not getting her nightly medications.  If not able to send back will obtain psych eval prior to discharge back home. HPI: 74 yo F hx of HTN, T2DM, HLD, asthma, hypothyroidism, seizure disorder, Parkinson's disease, dementia, and schizophrenia presenting from [facility] for agitation/aggressive behavior since Monday (4 days prior to arrival). Unable to obtain history from patient due to agitation, patient repeating herself, and medical intervention. Patient was agitated during transport and in triage, yelling racial slurs, threatening staff, and being combative. Pt required sedation prior to coming to main ED but still awake and yelling at staff.   Spoke with [facility]. Patient was sent in for agitation/agression since Monday. MD at facility wanted her to undergo "a psych evaluation." Patient was combative, yelling, and refusing meds at the facility.     EXAM: Patient is awake and yelling at staff and threatening staff physically.  Not able to obtain adequate physical exam but grossly patient has no musculoskeletal deformities or open wounds.  She is yelling full sentences so airway appears intact.  Does not appear to be tachypneic or in acute distress or short of breath.    MDM: 73-year-old female with history of hypertension, type 2 diabetes, hyperlipidemia, asthma, hypothyroidism, seizure disorder, Parkinson disease, dementia and schizophrenia sent from [facility] by EMS for agitation/aggression towards staff since Monday and refusing to take medications.  Patient is aggressive and agitated here and repeating himself and screaming at staff and threatening staff.  She required sedation in the triage area prior to coming to the main ED given her condition.  Spoke to [place] with [facility] representative and she is unsure why she was sent here although states that MD wanted patient to go undergo a psych eval although on the paperwork states that the NP needed patient to be evaluated.  Patient is unable to be evaluated adequately as she is not providing additional history.  States that she has psych medications on board but might not have been given it given her aggressive nature.  At this time patient is sedated and getting drowsy.  When patient is sedated adequately will obtain labs and imaging including CT head to rule out cause of mental status change acutely.  Will rule out infection.  If all is normal may try to send patient back to [facility] to have outpatient eval but this is most likely related to her chronic condition with acute decompensation secondary to not getting her nightly medications.  If not able to send back will obtain psych eval prior to discharge back home.

## 2024-01-11 NOTE — H&P ADULT - NSHPSOCIALHISTORY_GEN_ALL_CORE
Lives currently in NH, at [facility]. Lives currently in NH, at [facility].

## 2024-01-11 NOTE — H&P ADULT - PROBLEM SELECTOR PLAN 1
-agitation in setting of dementia vs schizophrenia vs in the setting of NPH   -CTH showed no hemorrhage, midline shift,  chronic small vessel dz, ventricles prominent  to sulci suggestive of normal pressure hydrocephalus.   -consider neurology consult in AM if symptoms more consistent with agitation in the setting of NPH vs schizophrenia, unable to assess quality of symptoms overnight since patient was sedated with antipsychotics   -continue ollanzapine 2.5 mg IM Q8hr for agitation and haldol 2 mg Q6hr PRN for agitation for now  -QTc on admission with QTc of 450, continue to monitor daily   -psychiatry consult needed in the AM per primary team

## 2024-01-11 NOTE — ED PROVIDER NOTE - CLINICAL SUMMARY MEDICAL DECISION MAKING FREE TEXT BOX
72 yo F hx of HTN, T2DM, HLD, asthma, hypothyroidism, seizure disorder, Parkinson's disease, dementia, and schizophrenia presenting from [facility] for agitation/aggressive behavior since Monday. Similar prior episodes. Will obtain AMS workup. Will continue to medically manage aggression/agitation. 74 yo F hx of HTN, T2DM, HLD, asthma, hypothyroidism, seizure disorder, Parkinson's disease, dementia, and schizophrenia presenting from [facility] for agitation/aggressive behavior since Monday. Similar prior episodes. Will obtain AMS workup. Will continue to medically manage aggression/agitation.

## 2024-01-11 NOTE — H&P ADULT - PROBLEM SELECTOR PLAN 9
DVT prophylaxis: heparin 5000 units every 12 hours  Diet: cardiac puree diet   GI prophylaxis: none   Full code per NH records, will need to clarify with family in the AM not able to be contacted overnight

## 2024-01-11 NOTE — ED PROVIDER NOTE - NSFOLLOWUPINSTRUCTIONS_ED_ALL_ED_FT
- The patient was found to have a UTI.  We gave her Zyprexa, Haldol.  She is calm for us, eating and drinking.  She would like to go home.  I recommend a course of Bactrim twice a day for 7 days to treat a UTI.   She is also received her Synthroid, and her carbo levodopa in the morning, taking them orally.    - Your testing/exams was/were reassuring that dangerous emergencies/conditions are less likely to be occurring or to have occurred.    - Take all medications, if given/sent to pharmacy, and as, directed.    - If you had labs or imaging done, you were given copies of all labs and/or imaging results from your er visit--please take them with you to your follow up appointments.  - If needed, call patient access services at [phone] to find a primary care physician (PCP). Call this number to follow up with a specialty service, such as the spine clinic. If you need this, call and say you were recently in the emergency department and you are calling, per my orders.   - Make sure you do not require a primary care physician's referral if you make a specialty clinic appointment directly. Some insurance requires you to see your PCP, get a referral, then make a specialty appointment. - The patient was found to have a UTI.  We gave her Zyprexa, Haldol.  She is calm for us, eating and drinking.  She would like to go home.  I recommend a course of Bactrim twice a day for 7 days to treat a UTI.   She is also received her Synthroid, and her carbo levodopa in the morning, taking them orally.    - Your testing/exams was/were reassuring that dangerous emergencies/conditions are less likely to be occurring or to have occurred.    - Take all medications, if given/sent to pharmacy, and as, directed.    - If you had labs or imaging done, you were given copies of all labs and/or imaging results from your er visit--please take them with you to your follow up appointments.  - If needed, call patient access services at [phone] to find a primary care physician (PCP). Call this number to follow up with a specialty service, such as the spine clinic. If you need this, call and say you were recently in the emergency department and you are calling, per my orders.   - Make sure you do not require a primary care physician's referral if you make a specialty clinic appointment directly. Some insurance requires you to see your PCP, get a referral, then make a specialty appointment.

## 2024-01-11 NOTE — ED ADULT NURSE NOTE - CHIEF COMPLAINT QUOTE
Pt arrives from [facility] for report of agitation since "earlier today".  Pt is yelling at EMS and staff using racial slurs and threatening staff with violence.  Pt denies physical complaints.  EMS reports hx of hypothyroidism, DM, HTN, dementia, parkinson's, HLD, Schizophrenia,  Unable to complete fs in triage due to pt refusal to keep still while attempting.   Dr. Charles assessing pt in triage. Pt arrives from [facility] for report of agitation since "earlier today".  Pt is yelling at EMS and staff using racial slurs and threatening staff with violence.  Pt denies physical complaints.  EMS reports hx of hypothyroidism, DM, HTN, dementia, parkinson's, HLD, Schizophrenia,  Unable to complete fs in triage due to pt refusal to keep still while attempting.   Dr. Charles assessing pt in triage.

## 2024-01-11 NOTE — ED ADULT NURSE REASSESSMENT NOTE - NS ED NURSE REASSESS COMMENT FT1
Pt became agitated again after having to go to the bathroom, pt trying to roam to find a bathroom, becoming loud, with incomprehensible speech. Pt taken to restroom by PCA, continued being loud, and not wanting to follow commands and disturbing other patient and MDs. Pt placed in stretcher and IM medication given. Pt lying in stretcher not trying to get up. Will continue to monitor.

## 2024-01-11 NOTE — H&P ADULT - PROBLEM SELECTOR PLAN 5
-continue metoprolol tartrate 25 mg BID and lisinopril 10 mg daily with BP and HR parameters  -continue to monitor BP daily

## 2024-01-11 NOTE — ED ADULT NURSE REASSESSMENT NOTE - NS ED NURSE REASSESS COMMENT FT1
GENEVIEVE RN: Pt arrives to ED  KATIESUMAN from [facility] for agitation since earlier today. Upon arrival to  pt has soft wrist restraints in place from EMS due to severe agitation and multiple attempts to strike EMS and throw objects. Pt continues to utilize racial slurs, and make threats of violence toward ED staff and EMS. Pt given 2.5mg of Zyprexa IM as per MD order. Medication did not have positive effect, pt continued to be agitated despite multiple attempts to verbally deescalate patient. MD Charles notified and given IM Haldol and Benadryl. Soft wrist restraints then able to be discontinued and brought to spot 29. GENEVIEVE RN: Pt arrives to ED  [facility] from [facility] for agitation since earlier today. Upon arrival to  pt has soft wrist restraints in place from EMS due to severe agitation and multiple attempts to strike EMS and throw objects. Pt continues to utilize racial slurs, and make threats of violence toward ED staff and EMS. Pt given 2.5mg of Zyprexa IM as per MD order. Medication did not have positive effect, pt continued to be agitated despite multiple attempts to verbally deescalate patient. MD Charles notified and given IM Haldol and Benadryl. Soft wrist restraints then able to be discontinued and brought to spot 29.

## 2024-01-11 NOTE — H&P ADULT - HISTORY OF PRESENT ILLNESS
Ms. Azul is a 73 year old F with history of vascular dementia, Parkinson's, retinopathy with macular edema, asthma, seizure, hypothyroidism, HLD, GERD, MDD, MI, malignant neoplasm of bladder, anxiety, hydrocephalus, OA, schizophrenia, HTN complicated by with retinopathy who presents from NH ([facility]) for evaluation of agitation. Per NH, the patient wa not taking her medications and there was initial concern for UTI.     Labs were sig for the following: 10.5/32, RDW 15.3, BG , BUN/Cr 23/1.46, AST/ALT 42/18, EGFR 38,  UA was sig for the following large LE, 6 RBC. CTH showed no hemorrhage, midline shift,  chronic small vessel dz, ventricles prominent  to sulci suggestive of normal pressure hydrocephalus.     History was limited secondary to sedation from medications given for agitation and collateral was not able to be obtained from either sister or son (with phone numbers in the chart).  She was given the following in the ED: bactrim DSx1, ceftriaxone 1 gram x1, 1 NaCl, haldol 5 mg x2, lorazepam 2 mg x2, olanzapine 2.5 mg x1 and benadryl 50 mg x1.  Ms. Azul is a 73 year old F with history of vascular dementia, Parkinson's, retinopathy with macular edema, asthma, seizure, hypothyroidism, HLD, GERD, MDD, MI, malignant neoplasm of bladder, anxiety, hydrocephalus, OA, schizophrenia, HTN complicated by with retinopathy who presents from NH ([facility]) for evaluation of agitation. Per NH, the patient wa not taking her medications and there was initial concern for UTI.     Labs were sig for the following: 10.5/32, RDW 15.3, BG , BUN/Cr 23/1.46, AST/ALT 42/18, EGFR 38,  UA was sig for the following large LE, 6 RBC. CTH showed no hemorrhage, midline shift,  chronic small vessel dz, ventricles prominent  to sulci suggestive of normal pressure hydrocephalus.     History was limited secondary to sedation from medications given for agitation and collateral was not able to be obtained from either sister or son (with phone numbers in the chart).  She was given the following in the ED: bactrim DSx1, ceftriaxone 1 gram x1, 1 NaCl, haldol 5 mg x2, lorazepam 2 mg x2, olanzapine 2.5 mg x1 and benadryl 50 mg x1.  Ms. Azul is a 73 year old F with history of vascular dementia, Parkinson's, retinopathy with macular edema, asthma, seizure, hypothyroidism, HLD, GERD, MDD, MI, malignant neoplasm of bladder, anxiety, hydrocephalus, OA, schizophrenia, HTN complicated by with retinopathy who presents from NH ([facility]) for evaluation of agitation (for 4 days per NH). Per NH, the patient was not taking her medications and there was initial concern for UTI. Labs were sig for the following: 10.5/32, RDW 15.3, BG , BUN/Cr 23/1.46, AST/ALT 42/18, EGFR 38,  UA was sig for the following large LE, 6 RBC. CTH showed no hemorrhage, midline shift,  chronic small vessel dz, ventricles prominent  to sulci suggestive of normal pressure hydrocephalus. Per reports from the chart the patient was yelling racial slurs, threatening the staff and being combative. Per reports she became agitated after going to the bathroom, with loud incomprehensible speech.     History was limited secondary to sedation from medications given for agitation and collateral was not able to be obtained from either sister or son (with phone numbers in the chart).  She was given the following in the ED: bactrim DSx1, ceftriaxone 1 gram x1, 1 NaCl, haldol 5 mg x2, lorazepam 2 mg x2, olanzapine 2.5 mg x1 and benadryl 50 mg x1.      Ms. Azul is a 73 year old F with history of vascular dementia, Parkinson's, retinopathy with macular edema, asthma, seizure, hypothyroidism, HLD, GERD, MDD, MI, malignant neoplasm of bladder, anxiety, hydrocephalus, OA, schizophrenia, HTN complicated by with retinopathy who presents from NH ([facility]) for evaluation of agitation (for 4 days per NH). Per NH, the patient was not taking her medications and there was initial concern for UTI. Labs were sig for the following: 10.5/32, RDW 15.3, BG , BUN/Cr 23/1.46, AST/ALT 42/18, EGFR 38,  UA was sig for the following large LE, 6 RBC. CTH showed no hemorrhage, midline shift,  chronic small vessel dz, ventricles prominent  to sulci suggestive of normal pressure hydrocephalus. Per reports from the chart the patient was yelling racial slurs, threatening the staff and being combative. Per reports she became agitated after going to the bathroom, with loud incomprehensible speech.     History was limited secondary to sedation from medications given for agitation and collateral was not able to be obtained from either sister or son (with phone numbers in the chart).  She was given the following in the ED: bactrim DSx1, ceftriaxone 1 gram x1, 1 NaCl, haldol 5 mg x2, lorazepam 2 mg x2, olanzapine 2.5 mg x1 and benadryl 50 mg x1.

## 2024-01-11 NOTE — ED PROVIDER NOTE - PATIENT PORTAL LINK FT
You can access the FollowMyHealth Patient Portal offered by [facility] by registering at the following website: [email or web address]. By joining [employer]’s FollowMyHealth portal, you will also be able to view your health information using other applications (apps) compatible with our system. You can access the FollowMyHealth Patient Portal offered by [facility] by registering at the following website: [email or web address]. By joining [employer]’s FollowMyHealth portal, you will also be able to view your health information using other applications (apps) compatible with our system.

## 2024-01-11 NOTE — H&P ADULT - NSHPPHYSICALEXAM_GEN_ALL_CORE
Vital Signs Last 24 Hrs  T(C): 36.3 (11 Jan 2024 19:52), Max: 36.7 (11 Jan 2024 04:24)  T(F): 97.3 (11 Jan 2024 19:52), Max: 98.1 (11 Jan 2024 04:24)  HR: 66 (11 Jan 2024 22:34) (60 - 83)  BP: 112/52 (11 Jan 2024 22:34) (104/71 - 162/82)  BP(mean): --  RR: 18 (11 Jan 2024 22:34) (14 - 18)  SpO2: 100% (11 Jan 2024 22:34) (95% - 100%)    Parameters below as of 11 Jan 2024 22:34  Patient On (Oxygen Delivery Method): room air        CONSTITUTIONAL: Well-groomed, in no apparent distress  EYES: No conjunctival or scleral injection, non-icteric; PERRLA and symmetric  ENMT: No external nasal lesions; nasal mucosa not inflamed  NECK: Trachea midline without palpable neck mass; thyroid not enlarged and non-tender  RESPIRATORY: Breathing comfortably;  lungs CTA without wheeze/rhonchi/rales  CARDIOVASCULAR: +S1S2, RRR, no M/G/R; no lower extremity edema bilaterally   GASTROINTESTINAL: No palpable masses or tenderness, +BS throughout, no rebound/guarding; no hepatosplenomegaly; no hernia palpated  MUSCULOSKELETAL: no digital clubbing or cyanosis;  moves all extremities with verbal and painful stimuli   SKIN: No rashes or ulcers noted; no subcutaneous nodules or induration palpable  NEUROLOGIC: pupils reactive bilaterally, anisocoria (at baseline)  PSYCHIATRIC: A+O x 0 when seen due to sedation from antipsychotics, reportedly per nursing patient was alert and orientedx2 (oriented to self and place, not time) ; sleepy -alert and follows commands to verbal and painful stimuli

## 2024-01-11 NOTE — CHART NOTE - NSCHARTNOTEFT_GEN_A_CORE
SW contacted by medical resident advising that pt is ready to return to [facility] ([id number] [place] [address]). SW contacted facility ([phone]) and spoke with nursing supervisor who requested to speak to a provider as they feel the pt's agitation was not related to a UTI. SW provided information to medical team for follow-up. SW remains available. SW contacted by medical resident advising that pt is ready to return to [facility] ([id number] [place] [address]). SW contacted facility ([phone]) and spoke with nursing supervisor who requested to speak to a provider as they feel the pt's agitation was not related to a UTI. SW provided information to medical team for follow-up. SW remains available.

## 2024-01-11 NOTE — ED PROVIDER NOTE - PHYSICAL EXAMINATION
General: Well developed; agitated, dressed erratically (wearing tinsel)  Eyes: PERRLA, EOMI; conjunctiva and sclera clear  HEENT: NCAT, no nasal discharge  Respiratory: No chest wall deformity, CTABL  Cardiovascular: RRR, +S1/S2, no murmurs, rubs, or gallops  Abdominal: Normal BS, soft, non-tender, non-distended  Extremities: Full ROM  Neurological: strength and sensation grossly intact, patient uncooperative  Skin: WWP. No rashes or lesions

## 2024-01-12 DIAGNOSIS — E03.9 HYPOTHYROIDISM, UNSPECIFIED: ICD-10-CM

## 2024-01-12 DIAGNOSIS — F20.9 SCHIZOPHRENIA, UNSPECIFIED: ICD-10-CM

## 2024-01-12 DIAGNOSIS — Z29.9 ENCOUNTER FOR PROPHYLACTIC MEASURES, UNSPECIFIED: ICD-10-CM

## 2024-01-12 DIAGNOSIS — I10 ESSENTIAL (PRIMARY) HYPERTENSION: ICD-10-CM

## 2024-01-12 DIAGNOSIS — R56.9 UNSPECIFIED CONVULSIONS: ICD-10-CM

## 2024-01-12 DIAGNOSIS — G20.A1 PARKINSON'S DISEASE WITHOUT DYSKINESIA, WITHOUT MENTION OF FLUCTUATIONS: ICD-10-CM

## 2024-01-12 DIAGNOSIS — R45.1 RESTLESSNESS AND AGITATION: ICD-10-CM

## 2024-01-12 DIAGNOSIS — N17.9 ACUTE KIDNEY FAILURE, UNSPECIFIED: ICD-10-CM

## 2024-01-12 DIAGNOSIS — F25.9 SCHIZOAFFECTIVE DISORDER, UNSPECIFIED: ICD-10-CM

## 2024-01-12 DIAGNOSIS — F01.50 VASCULAR DEMENTIA WITHOUT BEHAVIORAL DISTURBANCE: ICD-10-CM

## 2024-01-12 DIAGNOSIS — D64.9 ANEMIA, UNSPECIFIED: ICD-10-CM

## 2024-01-12 LAB
A1C WITH ESTIMATED AVERAGE GLUCOSE RESULT: 7.6 % — HIGH (ref 4–5.6)
A1C WITH ESTIMATED AVERAGE GLUCOSE RESULT: 7.6 % — HIGH (ref 4–5.6)
ANION GAP SERPL CALC-SCNC: 9 MMOL/L — SIGNIFICANT CHANGE UP (ref 7–14)
ANION GAP SERPL CALC-SCNC: 9 MMOL/L — SIGNIFICANT CHANGE UP (ref 7–14)
BUN SERPL-MCNC: 15 MG/DL — SIGNIFICANT CHANGE UP (ref 7–23)
BUN SERPL-MCNC: 15 MG/DL — SIGNIFICANT CHANGE UP (ref 7–23)
CALCIUM SERPL-MCNC: 9 MG/DL — SIGNIFICANT CHANGE UP (ref 8.4–10.5)
CALCIUM SERPL-MCNC: 9 MG/DL — SIGNIFICANT CHANGE UP (ref 8.4–10.5)
CHLORIDE SERPL-SCNC: 110 MMOL/L — HIGH (ref 98–107)
CHLORIDE SERPL-SCNC: 110 MMOL/L — HIGH (ref 98–107)
CO2 SERPL-SCNC: 25 MMOL/L — SIGNIFICANT CHANGE UP (ref 22–31)
CO2 SERPL-SCNC: 25 MMOL/L — SIGNIFICANT CHANGE UP (ref 22–31)
CREAT SERPL-MCNC: 1.05 MG/DL — SIGNIFICANT CHANGE UP (ref 0.5–1.3)
CREAT SERPL-MCNC: 1.05 MG/DL — SIGNIFICANT CHANGE UP (ref 0.5–1.3)
CULTURE RESULTS: SIGNIFICANT CHANGE UP
CULTURE RESULTS: SIGNIFICANT CHANGE UP
EGFR: 57 ML/MIN/1.73M2 — LOW
EGFR: 57 ML/MIN/1.73M2 — LOW
ESTIMATED AVERAGE GLUCOSE: 171 — SIGNIFICANT CHANGE UP
ESTIMATED AVERAGE GLUCOSE: 171 — SIGNIFICANT CHANGE UP
FERRITIN SERPL-MCNC: 144 NG/ML — SIGNIFICANT CHANGE UP (ref 13–330)
FERRITIN SERPL-MCNC: 144 NG/ML — SIGNIFICANT CHANGE UP (ref 13–330)
FOLATE SERPL-MCNC: 14.4 NG/ML — SIGNIFICANT CHANGE UP (ref 3.1–17.5)
FOLATE SERPL-MCNC: 14.4 NG/ML — SIGNIFICANT CHANGE UP (ref 3.1–17.5)
GLUCOSE BLDC GLUCOMTR-MCNC: 198 MG/DL — HIGH (ref 70–99)
GLUCOSE BLDC GLUCOMTR-MCNC: 198 MG/DL — HIGH (ref 70–99)
GLUCOSE BLDC GLUCOMTR-MCNC: 59 MG/DL — LOW (ref 70–99)
GLUCOSE BLDC GLUCOMTR-MCNC: 59 MG/DL — LOW (ref 70–99)
GLUCOSE BLDC GLUCOMTR-MCNC: 71 MG/DL — SIGNIFICANT CHANGE UP (ref 70–99)
GLUCOSE BLDC GLUCOMTR-MCNC: 71 MG/DL — SIGNIFICANT CHANGE UP (ref 70–99)
GLUCOSE BLDC GLUCOMTR-MCNC: 79 MG/DL — SIGNIFICANT CHANGE UP (ref 70–99)
GLUCOSE BLDC GLUCOMTR-MCNC: 79 MG/DL — SIGNIFICANT CHANGE UP (ref 70–99)
GLUCOSE BLDC GLUCOMTR-MCNC: 90 MG/DL — SIGNIFICANT CHANGE UP (ref 70–99)
GLUCOSE BLDC GLUCOMTR-MCNC: 90 MG/DL — SIGNIFICANT CHANGE UP (ref 70–99)
GLUCOSE BLDC GLUCOMTR-MCNC: 91 MG/DL — SIGNIFICANT CHANGE UP (ref 70–99)
GLUCOSE BLDC GLUCOMTR-MCNC: 91 MG/DL — SIGNIFICANT CHANGE UP (ref 70–99)
GLUCOSE SERPL-MCNC: 99 MG/DL — SIGNIFICANT CHANGE UP (ref 70–99)
GLUCOSE SERPL-MCNC: 99 MG/DL — SIGNIFICANT CHANGE UP (ref 70–99)
HCT VFR BLD CALC: 34.7 % — SIGNIFICANT CHANGE UP (ref 34.5–45)
HCT VFR BLD CALC: 34.7 % — SIGNIFICANT CHANGE UP (ref 34.5–45)
HGB BLD-MCNC: 10.9 G/DL — LOW (ref 11.5–15.5)
HGB BLD-MCNC: 10.9 G/DL — LOW (ref 11.5–15.5)
IRON SATN MFR SERPL: 13 % — LOW (ref 14–50)
IRON SATN MFR SERPL: 13 % — LOW (ref 14–50)
IRON SATN MFR SERPL: 35 UG/DL — SIGNIFICANT CHANGE UP (ref 30–160)
IRON SATN MFR SERPL: 35 UG/DL — SIGNIFICANT CHANGE UP (ref 30–160)
MAGNESIUM SERPL-MCNC: 2.1 MG/DL — SIGNIFICANT CHANGE UP (ref 1.6–2.6)
MAGNESIUM SERPL-MCNC: 2.1 MG/DL — SIGNIFICANT CHANGE UP (ref 1.6–2.6)
MCHC RBC-ENTMCNC: 26.7 PG — LOW (ref 27–34)
MCHC RBC-ENTMCNC: 26.7 PG — LOW (ref 27–34)
MCHC RBC-ENTMCNC: 31.4 GM/DL — LOW (ref 32–36)
MCHC RBC-ENTMCNC: 31.4 GM/DL — LOW (ref 32–36)
MCV RBC AUTO: 84.8 FL — SIGNIFICANT CHANGE UP (ref 80–100)
MCV RBC AUTO: 84.8 FL — SIGNIFICANT CHANGE UP (ref 80–100)
NRBC # BLD: 0 /100 WBCS — SIGNIFICANT CHANGE UP (ref 0–0)
NRBC # BLD: 0 /100 WBCS — SIGNIFICANT CHANGE UP (ref 0–0)
NRBC # FLD: 0 K/UL — SIGNIFICANT CHANGE UP (ref 0–0)
NRBC # FLD: 0 K/UL — SIGNIFICANT CHANGE UP (ref 0–0)
PHOSPHATE SERPL-MCNC: 2.5 MG/DL — SIGNIFICANT CHANGE UP (ref 2.5–4.5)
PHOSPHATE SERPL-MCNC: 2.5 MG/DL — SIGNIFICANT CHANGE UP (ref 2.5–4.5)
PLATELET # BLD AUTO: 216 K/UL — SIGNIFICANT CHANGE UP (ref 150–400)
PLATELET # BLD AUTO: 216 K/UL — SIGNIFICANT CHANGE UP (ref 150–400)
POTASSIUM SERPL-MCNC: 4.9 MMOL/L — SIGNIFICANT CHANGE UP (ref 3.5–5.3)
POTASSIUM SERPL-MCNC: 4.9 MMOL/L — SIGNIFICANT CHANGE UP (ref 3.5–5.3)
POTASSIUM SERPL-SCNC: 4.9 MMOL/L — SIGNIFICANT CHANGE UP (ref 3.5–5.3)
POTASSIUM SERPL-SCNC: 4.9 MMOL/L — SIGNIFICANT CHANGE UP (ref 3.5–5.3)
RBC # BLD: 4.09 M/UL — SIGNIFICANT CHANGE UP (ref 3.8–5.2)
RBC # BLD: 4.09 M/UL — SIGNIFICANT CHANGE UP (ref 3.8–5.2)
RBC # FLD: 15.1 % — HIGH (ref 10.3–14.5)
RBC # FLD: 15.1 % — HIGH (ref 10.3–14.5)
SODIUM SERPL-SCNC: 144 MMOL/L — SIGNIFICANT CHANGE UP (ref 135–145)
SODIUM SERPL-SCNC: 144 MMOL/L — SIGNIFICANT CHANGE UP (ref 135–145)
SPECIMEN SOURCE: SIGNIFICANT CHANGE UP
SPECIMEN SOURCE: SIGNIFICANT CHANGE UP
TIBC SERPL-MCNC: 273 UG/DL — SIGNIFICANT CHANGE UP (ref 220–430)
TIBC SERPL-MCNC: 273 UG/DL — SIGNIFICANT CHANGE UP (ref 220–430)
TRANSFERRIN SERPL-MCNC: 223 MG/DL — SIGNIFICANT CHANGE UP (ref 200–360)
TRANSFERRIN SERPL-MCNC: 223 MG/DL — SIGNIFICANT CHANGE UP (ref 200–360)
TSH SERPL-MCNC: 3.89 UIU/ML — SIGNIFICANT CHANGE UP (ref 0.27–4.2)
TSH SERPL-MCNC: 3.89 UIU/ML — SIGNIFICANT CHANGE UP (ref 0.27–4.2)
UIBC SERPL-MCNC: 238 UG/DL — SIGNIFICANT CHANGE UP (ref 110–370)
UIBC SERPL-MCNC: 238 UG/DL — SIGNIFICANT CHANGE UP (ref 110–370)
VIT B12 SERPL-MCNC: 1010 PG/ML — HIGH (ref 200–900)
VIT B12 SERPL-MCNC: 1010 PG/ML — HIGH (ref 200–900)
WBC # BLD: 5.42 K/UL — SIGNIFICANT CHANGE UP (ref 3.8–10.5)
WBC # BLD: 5.42 K/UL — SIGNIFICANT CHANGE UP (ref 3.8–10.5)
WBC # FLD AUTO: 5.42 K/UL — SIGNIFICANT CHANGE UP (ref 3.8–10.5)
WBC # FLD AUTO: 5.42 K/UL — SIGNIFICANT CHANGE UP (ref 3.8–10.5)

## 2024-01-12 PROCEDURE — 99232 SBSQ HOSP IP/OBS MODERATE 35: CPT

## 2024-01-12 PROCEDURE — 90792 PSYCH DIAG EVAL W/MED SRVCS: CPT

## 2024-01-12 PROCEDURE — 76770 US EXAM ABDO BACK WALL COMP: CPT | Mod: 26

## 2024-01-12 PROCEDURE — 93010 ELECTROCARDIOGRAM REPORT: CPT

## 2024-01-12 RX ORDER — POLYETHYLENE GLYCOL 3350 17 G/17G
17 POWDER, FOR SOLUTION ORAL EVERY 12 HOURS
Refills: 0 | Status: DISCONTINUED | OUTPATIENT
Start: 2024-01-12 | End: 2024-01-17

## 2024-01-12 RX ORDER — CARBIDOPA AND LEVODOPA 25; 100 MG/1; MG/1
1 TABLET ORAL THREE TIMES A DAY
Refills: 0 | Status: DISCONTINUED | OUTPATIENT
Start: 2024-01-12 | End: 2024-01-17

## 2024-01-12 RX ORDER — HALOPERIDOL DECANOATE 100 MG/ML
5 INJECTION INTRAMUSCULAR ONCE
Refills: 0 | Status: COMPLETED | OUTPATIENT
Start: 2024-01-12 | End: 2024-01-12

## 2024-01-12 RX ORDER — OLANZAPINE 15 MG/1
2.5 TABLET, FILM COATED ORAL EVERY 8 HOURS
Refills: 0 | Status: DISCONTINUED | OUTPATIENT
Start: 2024-01-12 | End: 2024-01-14

## 2024-01-12 RX ORDER — SOD,AMMONIUM,POTASSIUM LACTATE
1 CREAM (GRAM) TOPICAL
Refills: 0 | Status: DISCONTINUED | OUTPATIENT
Start: 2024-01-12 | End: 2024-01-17

## 2024-01-12 RX ORDER — LEVOTHYROXINE SODIUM 125 MCG
75 TABLET ORAL DAILY
Refills: 0 | Status: DISCONTINUED | OUTPATIENT
Start: 2024-01-12 | End: 2024-01-17

## 2024-01-12 RX ORDER — QUETIAPINE FUMARATE 200 MG/1
75 TABLET, FILM COATED ORAL AT BEDTIME
Refills: 0 | Status: DISCONTINUED | OUTPATIENT
Start: 2024-01-12 | End: 2024-01-14

## 2024-01-12 RX ORDER — VALPROIC ACID (AS SODIUM SALT) 250 MG/5ML
250 SOLUTION, ORAL ORAL EVERY 8 HOURS
Refills: 0 | Status: DISCONTINUED | OUTPATIENT
Start: 2024-01-12 | End: 2024-01-17

## 2024-01-12 RX ORDER — IPRATROPIUM/ALBUTEROL SULFATE 18-103MCG
3 AEROSOL WITH ADAPTER (GRAM) INHALATION EVERY 6 HOURS
Refills: 0 | Status: DISCONTINUED | OUTPATIENT
Start: 2024-01-12 | End: 2024-01-16

## 2024-01-12 RX ORDER — HALOPERIDOL DECANOATE 100 MG/ML
2 INJECTION INTRAMUSCULAR EVERY 6 HOURS
Refills: 0 | Status: DISCONTINUED | OUTPATIENT
Start: 2024-01-12 | End: 2024-01-12

## 2024-01-12 RX ORDER — HYDROCORTISONE 1 %
1 OINTMENT (GRAM) TOPICAL
Refills: 0 | Status: DISCONTINUED | OUTPATIENT
Start: 2024-01-12 | End: 2024-01-17

## 2024-01-12 RX ORDER — HYDROCORTISONE 1 %
1 OINTMENT (GRAM) TOPICAL
Refills: 0 | Status: DISCONTINUED | OUTPATIENT
Start: 2024-01-12 | End: 2024-01-12

## 2024-01-12 RX ORDER — QUETIAPINE FUMARATE 200 MG/1
50 TABLET, FILM COATED ORAL
Refills: 0 | Status: DISCONTINUED | OUTPATIENT
Start: 2024-01-12 | End: 2024-01-14

## 2024-01-12 RX ORDER — DEXTROSE 50 % IN WATER 50 %
12.5 SYRINGE (ML) INTRAVENOUS ONCE
Refills: 0 | Status: COMPLETED | OUTPATIENT
Start: 2024-01-12 | End: 2024-01-12

## 2024-01-12 RX ORDER — SENNA PLUS 8.6 MG/1
2 TABLET ORAL AT BEDTIME
Refills: 0 | Status: DISCONTINUED | OUTPATIENT
Start: 2024-01-12 | End: 2024-01-17

## 2024-01-12 RX ORDER — HEPARIN SODIUM 5000 [USP'U]/ML
5000 INJECTION INTRAVENOUS; SUBCUTANEOUS EVERY 12 HOURS
Refills: 0 | Status: DISCONTINUED | OUTPATIENT
Start: 2024-01-12 | End: 2024-01-17

## 2024-01-12 RX ORDER — CLONAZEPAM 1 MG
0.5 TABLET ORAL
Refills: 0 | Status: DISCONTINUED | OUTPATIENT
Start: 2024-01-12 | End: 2024-01-17

## 2024-01-12 RX ORDER — OLANZAPINE 15 MG/1
2.5 TABLET, FILM COATED ORAL EVERY 8 HOURS
Refills: 0 | Status: DISCONTINUED | OUTPATIENT
Start: 2024-01-12 | End: 2024-01-12

## 2024-01-12 RX ORDER — METOPROLOL TARTRATE 50 MG
25 TABLET ORAL
Refills: 0 | Status: DISCONTINUED | OUTPATIENT
Start: 2024-01-12 | End: 2024-01-17

## 2024-01-12 RX ORDER — VALACYCLOVIR 500 MG/1
1000 TABLET, FILM COATED ORAL EVERY 12 HOURS
Refills: 0 | Status: DISCONTINUED | OUTPATIENT
Start: 2024-01-12 | End: 2024-01-17

## 2024-01-12 RX ADMIN — Medication 0.5 MILLIGRAM(S): at 17:40

## 2024-01-12 RX ADMIN — Medication 250 MILLIGRAM(S): at 07:12

## 2024-01-12 RX ADMIN — Medication 1 APPLICATION(S): at 17:31

## 2024-01-12 RX ADMIN — VALACYCLOVIR 1000 MILLIGRAM(S): 500 TABLET, FILM COATED ORAL at 17:31

## 2024-01-12 RX ADMIN — CARBIDOPA AND LEVODOPA 1 TABLET(S): 25; 100 TABLET ORAL at 22:51

## 2024-01-12 RX ADMIN — Medication 250 MILLIGRAM(S): at 17:30

## 2024-01-12 RX ADMIN — VALACYCLOVIR 1000 MILLIGRAM(S): 500 TABLET, FILM COATED ORAL at 07:12

## 2024-01-12 RX ADMIN — Medication 75 MICROGRAM(S): at 13:02

## 2024-01-12 RX ADMIN — Medication 25 MILLIGRAM(S): at 07:11

## 2024-01-12 RX ADMIN — CARBIDOPA AND LEVODOPA 1 TABLET(S): 25; 100 TABLET ORAL at 13:02

## 2024-01-12 RX ADMIN — Medication 25 MILLIGRAM(S): at 17:43

## 2024-01-12 RX ADMIN — HEPARIN SODIUM 5000 UNIT(S): 5000 INJECTION INTRAVENOUS; SUBCUTANEOUS at 17:39

## 2024-01-12 RX ADMIN — POLYETHYLENE GLYCOL 3350 17 GRAM(S): 17 POWDER, FOR SOLUTION ORAL at 17:45

## 2024-01-12 RX ADMIN — LISINOPRIL 20 MILLIGRAM(S): 2.5 TABLET ORAL at 07:11

## 2024-01-12 RX ADMIN — SENNA PLUS 2 TABLET(S): 8.6 TABLET ORAL at 22:50

## 2024-01-12 RX ADMIN — HALOPERIDOL DECANOATE 5 MILLIGRAM(S): 100 INJECTION INTRAMUSCULAR at 02:16

## 2024-01-12 RX ADMIN — QUETIAPINE FUMARATE 75 MILLIGRAM(S): 200 TABLET, FILM COATED ORAL at 22:50

## 2024-01-12 RX ADMIN — OLANZAPINE 2.5 MILLIGRAM(S): 15 TABLET, FILM COATED ORAL at 23:43

## 2024-01-12 RX ADMIN — Medication 250 MILLIGRAM(S): at 22:52

## 2024-01-12 RX ADMIN — CARBIDOPA AND LEVODOPA 1 TABLET(S): 25; 100 TABLET ORAL at 07:11

## 2024-01-12 NOTE — PROGRESS NOTE ADULT - ASSESSMENT
Ms. Azul is a 73 year old F with history of vascular dementia, Parkinson's, retinopathy with macular edema, asthma, seizure, hypothyroidism, HLD, GERD, MDD, MI, malignant neoplasm of bladder, anxiety, hydrocephalus, OA, schizophrenia, HTN complicated by with retinopathy who presents from NH ([facility]) for evaluation of agitation (for 4 days per NH).  Ms. Azul is a 73 year old F with history of vascular dementia, Parkinson's, retinopathy with macular edema, asthma, seizure, hypothyroidism, HLD, GERD, MDD, MI, malignant neoplasm of bladder, anxiety, hydrocephalus, OA, schizophrenia, HTN complicated by with retinopathy who presents from NH ([facility]) for evaluation of agitation (for 4 days per NH).

## 2024-01-12 NOTE — PATIENT PROFILE ADULT - FALL HARM RISK - HARM RISK INTERVENTIONS
Assistance with ambulation/Assistance OOB with selected safe patient handling equipment/Communicate Risk of Fall with Harm to all staff/Discuss with provider need for PT consult/Monitor for mental status changes/Monitor gait and stability/Orthostatic vital signs/Reinforce activity limits and safety measures with patient and family/Tailored Fall Risk Interventions/Use of alarms - bed, chair and/or voice tab/Visual Cue: Yellow wristband and red socks/Bed in lowest position, wheels locked, appropriate side rails in place/Call bell, personal items and telephone in reach/Instruct patient to call for assistance before getting out of bed or chair/Non-slip footwear when patient is out of bed/[place] to call system/Physically safe environment - no spills, clutter or unnecessary equipment/Purposeful Proactive Rounding/Room/bathroom lighting operational, light cord in reach Assistance with ambulation/Assistance OOB with selected safe patient handling equipment/Communicate Risk of Fall with Harm to all staff/Discuss with provider need for PT consult/Monitor for mental status changes/Monitor gait and stability/Orthostatic vital signs/Reinforce activity limits and safety measures with patient and family/Tailored Fall Risk Interventions/Use of alarms - bed, chair and/or voice tab/Visual Cue: Yellow wristband and red socks/Bed in lowest position, wheels locked, appropriate side rails in place/Call bell, personal items and telephone in reach/Instruct patient to call for assistance before getting out of bed or chair/Non-slip footwear when patient is out of bed/[place] to call system/Physically safe environment - no spills, clutter or unnecessary equipment/Purposeful Proactive Rounding/Room/bathroom lighting operational, light cord in reach

## 2024-01-12 NOTE — DIETITIAN INITIAL EVALUATION ADULT - ADD RECOMMEND
1. Suggest change diet to Low Na, CSTCHO, Pureed  with Mildly Thick Liquids, with Glucerna Shake once daily to Mildly thickened consistency.   2. Consider SLP evaluation for possible diet upgrade (Pt previously on Mech/Soft diet).

## 2024-01-12 NOTE — BH CONSULTATION LIAISON ASSESSMENT NOTE - VIOLENCE RISK FACTORS:
Impulsivity/Lack of insight into violence risk/need for treatment Violent ideation/threat/speech/Impulsivity/Lack of insight into violence risk/need for treatment

## 2024-01-12 NOTE — BH CONSULTATION LIAISON ASSESSMENT NOTE - CURRENT MEDICATION
MEDICATIONS  (STANDING):  albuterol/ipratropium for Nebulization 3 milliLiter(s) Nebulizer every 6 hours  ammonium lactate 12% Lotion 1 Application(s) Topical two times a day  carbidopa/levodopa  25/100 1 Tablet(s) Oral three times a day  clotrimazole 1% Vaginal Cream 1 Applicatorful Vaginal two times a day  dextrose 5%. 1000 milliLiter(s) (50 mL/Hr) IV Continuous <Continuous>  dextrose 5%. 1000 milliLiter(s) (100 mL/Hr) IV Continuous <Continuous>  dextrose 50% Injectable 25 Gram(s) IV Push once  dextrose 50% Injectable 12.5 Gram(s) IV Push once  dextrose 50% Injectable 25 Gram(s) IV Push once  glucagon  Injectable 1 milliGRAM(s) IntraMuscular once  heparin   Injectable 5000 Unit(s) SubCutaneous every 12 hours  hydrocortisone 2.5% Ointment 1 Application(s) Topical two times a day  insulin lispro (ADMELOG) corrective regimen sliding scale   SubCutaneous three times a day before meals  levothyroxine 75 MICROGram(s) Oral daily  lisinopril 20 milliGRAM(s) Oral daily  metoprolol tartrate 25 milliGRAM(s) Oral two times a day  polyethylene glycol 3350 17 Gram(s) Oral every 12 hours  senna 2 Tablet(s) Oral at bedtime  valACYclovir 1000 milliGRAM(s) Oral every 12 hours  valproic acid 250 milliGRAM(s) Oral every 8 hours    MEDICATIONS  (PRN):  dextrose Oral Gel 15 Gram(s) Oral once PRN Blood Glucose LESS THAN 70 milliGRAM(s)/deciliter  haloperidol    Injectable 2 milliGRAM(s) IV Push every 6 hours PRN agitation  OLANZapine Injectable 2.5 milliGRAM(s) IntraMuscular every 8 hours PRN agitation

## 2024-01-12 NOTE — BH CONSULTATION LIAISON ASSESSMENT NOTE - NSBHCONSULTRECOMMENDOTHER_PSY_A_CORE FT
-Continue to hold antipsychotic meds in setting of acute largely hypoactive delirium  -Continue to hold benzodiazepines given concern for prior adverse response with oversedation  -VPA use as per medical/neurology service as has been used as AED  -Zyprexa 2.5mg Po/IM Q6H PRN for agitation. Monitor for QTc prolongation. May need upward titration given prior level of agitation

## 2024-01-12 NOTE — PHYSICAL THERAPY INITIAL EVALUATION ADULT - PERTINENT HX OF CURRENT PROBLEM, REHAB EVAL
73 year old female presents from nursing home with agitation. CT Head -  No evidence of acute intracranial hemorrhage or midline shift.

## 2024-01-12 NOTE — PROVIDER CONTACT NOTE (MEDICATION) - ACTION/TREATMENT ORDERED:
ACP made aware, as per ACP okay to hold all medications except synthroid. As per ACP, reschedule to breakfast and offer to pt again. Endorsed to day nurse and rescheduled medication.

## 2024-01-12 NOTE — DIETITIAN INITIAL EVALUATION ADULT - PERTINENT LABORATORY DATA
01-12    144  |  110<H>  |  15  ----------------------------<  99  4.9   |  25  |  1.05    Ca    9.0      12 Jan 2024 12:40  Phos  2.5     01-12  Mg     2.10     01-12    TPro  7.7  /  Alb  3.6  /  TBili  0.2  /  DBili  x   /  AST  42<H>  /  ALT  8   /  AlkPhos  111  01-11  POCT Blood Glucose.: 91 mg/dL (01-12-24 @ 12:49)  A1C with Estimated Average Glucose Result: 7.6 % (01-12-24 @ 12:40)  CAPILLARY BLOOD GLUCOSE  POCT Blood Glucose.: 91 mg/dL (12 Jan 2024 12:49)  POCT Blood Glucose.: 90 mg/dL (12 Jan 2024 08:33)  POCT Blood Glucose.: 198 mg/dL (12 Jan 2024 03:34)  POCT Blood Glucose.: 79 mg/dL (12 Jan 2024 02:58)  POCT Blood Glucose.: 71 mg/dL (12 Jan 2024 01:50)  POCT Blood Glucose.: 59 mg/dL (12 Jan 2024 01:44)  POCT Blood Glucose.: 142 mg/dL (11 Jan 2024 18:10)

## 2024-01-12 NOTE — BH CONSULTATION LIAISON ASSESSMENT NOTE - NSBHATTESTCOMMENTATTENDFT_PSY_A_CORE
Chart reviewed, pt. seen/evaluated with the resident, I agree with the above assessment/plan. Patient oriented, not agitated, though paranoid, illogical and disorganized, firmly denies si and hi, denies avh. Plan as above, please do not hesitate to call [facility] at #[phone] with any acute concerns. Will follow Chart reviewed, pt. seen/evaluated with the resident, I agree with the above assessment/plan. Patient oriented, not agitated, though paranoid, illogical and disorganized, firmly denies si and hi, denies avh. Plan as above, please do not hesitate to call [facility] at #[phone] with any acute concerns. Will follow

## 2024-01-12 NOTE — PROVIDER CONTACT NOTE (MEDICATION) - ASSESSMENT
Pt is uncooperative and agitated. Pt refusing lotions/creams being applied (see EMR). Pt refusing heparin, miralax, and synthroid.

## 2024-01-12 NOTE — BH CONSULTATION LIAISON ASSESSMENT NOTE - NSSUICPROTFACT_PSY_ALL_CORE
Supportive social network of family or friends Supportive social network of family or friends/Confucianist beliefs Supportive social network of family or friends/Confucianism beliefs

## 2024-01-12 NOTE — BH CONSULTATION LIAISON ASSESSMENT NOTE - VIOLENCE PROTECTIVE FACTORS:
Residential stability Residential stability/Sobriety/Engagement in treatment/Good treatment response/compliance

## 2024-01-12 NOTE — DIETITIAN INITIAL EVALUATION ADULT - PERTINENT MEDS FT
MEDICATIONS  (STANDING):  albuterol/ipratropium for Nebulization 3 milliLiter(s) Nebulizer every 6 hours  ammonium lactate 12% Lotion 1 Application(s) Topical two times a day  carbidopa/levodopa  25/100 1 Tablet(s) Oral three times a day  clotrimazole 1% Vaginal Cream 1 Applicatorful Vaginal two times a day  dextrose 5%. 1000 milliLiter(s) (50 mL/Hr) IV Continuous <Continuous>  dextrose 5%. 1000 milliLiter(s) (100 mL/Hr) IV Continuous <Continuous>  dextrose 50% Injectable 25 Gram(s) IV Push once  dextrose 50% Injectable 25 Gram(s) IV Push once  dextrose 50% Injectable 12.5 Gram(s) IV Push once  glucagon  Injectable 1 milliGRAM(s) IntraMuscular once  heparin   Injectable 5000 Unit(s) SubCutaneous every 12 hours  hydrocortisone 2.5% Ointment 1 Application(s) Topical two times a day  insulin lispro (ADMELOG) corrective regimen sliding scale   SubCutaneous three times a day before meals  levothyroxine 75 MICROGram(s) Oral daily  lisinopril 20 milliGRAM(s) Oral daily  metoprolol tartrate 25 milliGRAM(s) Oral two times a day  polyethylene glycol 3350 17 Gram(s) Oral every 12 hours  senna 2 Tablet(s) Oral at bedtime  valACYclovir 1000 milliGRAM(s) Oral every 12 hours  valproic acid 250 milliGRAM(s) Oral every 8 hours    MEDICATIONS  (PRN):  dextrose Oral Gel 15 Gram(s) Oral once PRN Blood Glucose LESS THAN 70 milliGRAM(s)/deciliter  haloperidol    Injectable 2 milliGRAM(s) IV Push every 6 hours PRN agitation  OLANZapine Injectable 2.5 milliGRAM(s) IntraMuscular every 8 hours PRN agitation

## 2024-01-12 NOTE — BH CONSULTATION LIAISON ASSESSMENT NOTE - NSBHCHARTREVIEWVS_PSY_A_CORE FT
Vital Signs Last 24 Hrs  T(C): 37.2 (12 Jan 2024 15:18), Max: 37.2 (12 Jan 2024 15:18)  T(F): 99 (12 Jan 2024 15:18), Max: 99 (12 Jan 2024 15:18)  HR: 63 (12 Jan 2024 15:18) (63 - 83)  BP: 123/62 (12 Jan 2024 15:18) (112/52 - 162/82)  BP(mean): --  RR: 18 (12 Jan 2024 15:18) (17 - 18)  SpO2: 100% (12 Jan 2024 15:18) (98% - 100%)    Parameters below as of 12 Jan 2024 15:18  Patient On (Oxygen Delivery Method): room air

## 2024-01-12 NOTE — DIETITIAN INITIAL EVALUATION ADULT - PROBLEM SELECTOR PLAN 4
-Cr 1.46, last Cr 0.9 (at [facility] Cr 0.9 on 04/2023)  -continue to monitor BMP daily   -monitor intake and output every 4 hours   -UA with 6 RBC, urine Na-pending   -Renal US to r/o obstruction/ hydronephrosis  -renal consult in the AM -Cr 1.46, last Cr 0.9 (at [facility] Cr 0.9 on 04/2023)  -continue to monitor BMP daily   -monitor intake and output every 4 hours   -UA with 6 RBC, urine Na-pending   -Renal US to r/o obstruction/ hydronephrosis  -renal consult in the AM

## 2024-01-12 NOTE — BH CONSULTATION LIAISON ASSESSMENT NOTE - OTHER PAST PSYCHIATRIC HISTORY (INCLUDE DETAILS REGARDING ONSET, COURSE OF ILLNESS, INPATIENT/OUTPATIENT TREATMENT)
Per chart, long hx of Schizophrenia/schizophrenia with multiple past inpatient hospitalizations, many at [facility] last in October-November 2021 for shabbir and psychosis prior to transfer [facility] for medical evaluation after found unresponsive. Per chart, long hx of Schizophrenia/schizophrenia with multiple past inpatient hospitalizations, many at [facility] last in October-November 2021 for shabbir and psychosis prior to transfer [facility] for medical evaluation after found unresponsive. Per chart, long hx of schizophrenia with multiple past inpatient hospitalizations, many at [facility]--last in October-November 2021 Per chart, long hx of schizophrenia with multiple past inpatient hospitalizations, many at [facility]--last in October-November 2021

## 2024-01-12 NOTE — BH CONSULTATION LIAISON ASSESSMENT NOTE - DIFFERENTIAL
Delirium, acute  Schizoaffective d/o  Parkinson's disease with dementia Delirium, acute  Dementia with behavioral disturbance  Schizoaffective d/o  Parkinson's disease with dementia

## 2024-01-12 NOTE — BH CONSULTATION LIAISON ASSESSMENT NOTE - ADDITIONAL DETAILS ALL
No known h/o SI seen in EMR. Pt unable to participate in assessment No known h/o SI seen in EMR or per nursing home. Pt unable to participate in assessment

## 2024-01-12 NOTE — PROGRESS NOTE ADULT - SUBJECTIVE AND OBJECTIVE BOX
PROGRESS NOTE:     Patient is a 73y old  Female who presents with a chief complaint of Restlessness and agitation     (12 Jan 2024 14:55)      SUBJECTIVE / OVERNIGHT EVENTS: no acute overnight events    ADDITIONAL REVIEW OF SYSTEMS:    MEDICATIONS  (STANDING):  albuterol/ipratropium for Nebulization 3 milliLiter(s) Nebulizer every 6 hours  ammonium lactate 12% Lotion 1 Application(s) Topical two times a day  carbidopa/levodopa  25/100 1 Tablet(s) Oral three times a day  clotrimazole 1% Vaginal Cream 1 Applicatorful Vaginal two times a day  dextrose 5%. 1000 milliLiter(s) (50 mL/Hr) IV Continuous <Continuous>  dextrose 5%. 1000 milliLiter(s) (100 mL/Hr) IV Continuous <Continuous>  dextrose 50% Injectable 25 Gram(s) IV Push once  dextrose 50% Injectable 12.5 Gram(s) IV Push once  dextrose 50% Injectable 25 Gram(s) IV Push once  glucagon  Injectable 1 milliGRAM(s) IntraMuscular once  heparin   Injectable 5000 Unit(s) SubCutaneous every 12 hours  hydrocortisone 2.5% Ointment 1 Application(s) Topical two times a day  insulin lispro (ADMELOG) corrective regimen sliding scale   SubCutaneous three times a day before meals  levothyroxine 75 MICROGram(s) Oral daily  lisinopril 20 milliGRAM(s) Oral daily  metoprolol tartrate 25 milliGRAM(s) Oral two times a day  polyethylene glycol 3350 17 Gram(s) Oral every 12 hours  senna 2 Tablet(s) Oral at bedtime  valACYclovir 1000 milliGRAM(s) Oral every 12 hours  valproic acid 250 milliGRAM(s) Oral every 8 hours    MEDICATIONS  (PRN):  dextrose Oral Gel 15 Gram(s) Oral once PRN Blood Glucose LESS THAN 70 milliGRAM(s)/deciliter  haloperidol    Injectable 2 milliGRAM(s) IV Push every 6 hours PRN agitation  OLANZapine Injectable 2.5 milliGRAM(s) IntraMuscular every 8 hours PRN agitation      CAPILLARY BLOOD GLUCOSE      POCT Blood Glucose.: 91 mg/dL (12 Jan 2024 12:49)  POCT Blood Glucose.: 90 mg/dL (12 Jan 2024 08:33)  POCT Blood Glucose.: 198 mg/dL (12 Jan 2024 03:34)  POCT Blood Glucose.: 79 mg/dL (12 Jan 2024 02:58)  POCT Blood Glucose.: 71 mg/dL (12 Jan 2024 01:50)  POCT Blood Glucose.: 59 mg/dL (12 Jan 2024 01:44)  POCT Blood Glucose.: 142 mg/dL (11 Jan 2024 18:10)    I&O's Summary      PHYSICAL EXAM:  Vital Signs Last 24 Hrs  T(C): 37.2 (12 Jan 2024 15:18), Max: 37.2 (12 Jan 2024 15:18)  T(F): 99 (12 Jan 2024 15:18), Max: 99 (12 Jan 2024 15:18)  HR: 63 (12 Jan 2024 15:18) (63 - 83)  BP: 123/62 (12 Jan 2024 15:18) (112/52 - 162/82)  BP(mean): --  RR: 18 (12 Jan 2024 15:18) (17 - 18)  SpO2: 100% (12 Jan 2024 15:18) (98% - 100%)    Parameters below as of 12 Jan 2024 15:18  Patient On (Oxygen Delivery Method): room air        CONSTITUTIONAL: NAD, well-developed  RESPIRATORY: Normal respiratory effort; lungs are clear to auscultation bilaterally  CARDIOVASCULAR: Regular rate and rhythm, normal S1 and S2, no murmur/rub/gallop; No lower extremity edema; Peripheral pulses are 2+ bilaterally  ABDOMEN: Nontender to palpation, normoactive bowel sounds, no rebound/guarding; No hepatosplenomegaly  MUSCLOSKELETAL: no clubbing or cyanosis of digits; no joint swelling or tenderness to palpation  PSYCH: A+O to person, place, and time; affect appropriate    LABS:                        10.9   5.42  )-----------( 216      ( 12 Jan 2024 12:40 )             34.7     01-12    144  |  110<H>  |  15  ----------------------------<  99  4.9   |  25  |  1.05    Ca    9.0      12 Jan 2024 12:40  Phos  2.5     01-12  Mg     2.10     01-12    TPro  7.7  /  Alb  3.6  /  TBili  0.2  /  DBili  x   /  AST  42<H>  /  ALT  8   /  AlkPhos  111  01-11          Urinalysis Basic - ( 12 Jan 2024 12:40 )    Color: x / Appearance: x / SG: x / pH: x  Gluc: 99 mg/dL / Ketone: x  / Bili: x / Urobili: x   Blood: x / Protein: x / Nitrite: x   Leuk Esterase: x / RBC: x / WBC x   Sq Epi: x / Non Sq Epi: x / Bacteria: x          RADIOLOGY & ADDITIONAL TESTS:  Results Reviewed:   Imaging Personally Reviewed:  Electrocardiogram Personally Reviewed:    COORDINATION OF CARE:  Care Discussed with Consultants/Other Providers [Y/N]:  Prior or Outpatient Records Reviewed [Y/N]:

## 2024-01-12 NOTE — BH CONSULTATION LIAISON ASSESSMENT NOTE - HPI (INCLUDE ILLNESS QUALITY, SEVERITY, DURATION, TIMING, CONTEXT, MODIFYING FACTORS, ASSOCIATED SIGNS AND SYMPTOMS)
As per primary team, 70F HTN, T2DM (a1c 6.0), asthma, hypothyroidism, seizure disorder, Parkinson's disease, schizophrenia transferred to the ED from [facility]  for altered mental status. Pt has prolonged hospital course, as she was initially transferred to [facility] from [facility] on 11/21 for lethargy and altered mental status. The patient underwent a comprehensive neuro/stroke workup with CTH, CTA head and neck, and CT perfusion being grossly negative for any acute pathology. The patient was then transferred to [facility] on 12/7 for active psychosis and schizophrenia requiring physical and chemical resistants. While on [facility], the patient was placed on multiple different psychotropic medications. Per aide at bedside who has worked with patient since her admission to [facility], patient has not been her usual self today. Pt is normally active, agitated at baseline, however was noted to be lethargic and somnolent today. Pt reportedly received a PRN Zyprexa and 2mg ativan the night before, and has been sleeping in bed all morning. She missed her meals and medications, so when the aide went to wake her up around 8pm, pt was minimally responsive, bradycardiac and cool to touch. A rapid response was called, and pt was brought to the ED. Unable to obtain History/ROS as pt obtunded and responsive only to noxious stimuli.    As per in-patient psychiatry unit, patient has multiple hospitalizations and presented with decompensated schizophrenia, with episodes of agitation, paranoia, grandiose delusions, irritability, with elevated mood and auditory and visual hallucinations. Patient was most recently on Olanzapine 5mg AM and 10mg PM, Depakote 500mg AM + 750mg QHS, Benztropine 0.25mg BID, and Seroquel 50mg Q6 PRN for agitation. On 12/15/21 patient was started on Clozapine 25mg QHS as she continued to be actively manic and psychotic.    Patient previously on numerous antipsychotic medications and has history of recurrent UTIs. Pt seen bedside in MICU. Pt was obtunded and lethargic, arousable to verbal stimulus but unable to follow one step commands. AAOx0. Unable to assess mood, thought process/content, perceptions, or cognition.  As per primary team, 70F HTN, T2DM (a1c 6.0), asthma, hypothyroidism, seizure disorder, Parkinson's disease, schizophrenia transferred to the ED from [facility]  for altered mental status. Pt has prolonged hospital course, as she was initially transferred to [facility] from [facility] on 11/21 for lethargy and altered mental status. The patient underwent a comprehensive neuro/stroke workup with CTH, CTA head and neck, and CT perfusion being grossly negative for any acute pathology. The patient was then transferred to [facility] on 12/7 for active psychosis and schizophrenia requiring physical and chemical resistants. While on [facility], the patient was placed on multiple different psychotropic medications. Per aide at bedside who has worked with patient since her admission to [facility], patient has not been her usual self today. Pt is normally active, agitated at baseline, however was noted to be lethargic and somnolent today. Pt reportedly received a PRN Zyprexa and 2mg ativan the night before, and has been sleeping in bed all morning. She missed her meals and medications, so when the aide went to wake her up around 8pm, pt was minimally responsive, bradycardiac and cool to touch. A rapid response was called, and pt was brought to the ED. Unable to obtain History/ROS as pt obtunded and responsive only to noxious stimuli.    As per in-patient psychiatry unit, patient has multiple hospitalizations and presented with decompensated schizophrenia, with episodes of agitation, paranoia, grandiose delusions, irritability, with elevated mood and auditory and visual hallucinations. Patient was most recently on Olanzapine 5mg AM and 10mg PM, Depakote 500mg AM + 750mg QHS, Benztropine 0.25mg BID, and Seroquel 50mg Q6 PRN for agitation. On 12/15/21 patient was started on Clozapine 25mg QHS as she continued to be actively manic and psychotic.    Patient previously on numerous antipsychotic medications and has history of recurrent UTIs. Pt seen bedside in MICU. Pt was obtunded and lethargic, arousable to verbal stimulus but unable to follow one step commands. AAOx0. Unable to assess mood, thought process/content, perceptions, or cognition.  74 yo F, living in [facility] for past 8 years, with PPHx schizophrenia, vascular dementia with behavioral disturbance; multiple inpatient psych admissions (x2 in 2023 per [facility], [facility] in Nov 2021); followed by outpt psych NP Wanda Aguillon; no known suicidality/SIB; h/o threatening violence towards staff and PMH of seizure disorder, Parkinson's disease, HTN, T2DM, asthma, hypothyroidism, retinopathy with macular edema, GERD, h/o malignant neoplasm of bladder who was transferred to [facility] ED from her nursing home on 1/11/24 due to agitation and disorganized behavior x 4 days. Psychiatry was consulted for agitation and medication management. Overnight, patient has been irritable and refusing labs/vitals.    Today, patient was oriented x 3 (person, place, date-stated Jan 4, 2024). Interview is limited due to patient disorganization. She reported that staff at her nursing home approached her yesterday in the dining room and told her she needs to "get out right now." Patient is unable to identify why she was transferred to [facility]. She denies any episodes of agitation towards people or property. She describes an episode where a nurse "Zoe" at her facility was blocking her entrance to the elevator and targeting her. She also believes the male  has been targeting her and does not like her. Patient states intent to "beat up" the  and "teach him a lesson," although she says "I wouldn't kill him." She endorses IOR with messages from the TV telling her that she cannot trust the  at [facility]. She denies AVH or recent bizarre experiences. Reports good sleep, appetite, and mood. Patient denies recent depression, S/IIP, SIB, or violence. She has been adherent with medications, which are given to her by staff at the facility. She denies H/IIP directed towards anyone at [facility] and feels safe in the hospital.    Collateral obtained from [facility] Director of Nursing Chantelle ([phone]) and psych NP Wanda Aguillon ([phone]). Patient has recently been acting bizarrely, including walking around with a scarf over her face like a veil and refusing to show her face ("my face looks bad"), standing near the window and talking to herself, and telling the male  who is in a cast "I wish your foot will fall off." She has also been verbally agitated and using racial slurs, which is unlike her baseline when she is pleasant with staff. The morning of transfer to [facility], she made violent threats towards staff and was attempting to hit them. No other recent physical aggression. Current psych meds include seroquel 50 mg PO TID, klonopin 0.5 mg BID (taking for several months), and depakote 500 mg + 750 mg (prescribed for seizures). She has a h/o Parkinson's with shuffling gait and takes sinemet.  72 yo F, living in [facility] for past 8 years, with PPHx schizophrenia, vascular dementia with behavioral disturbance; multiple inpatient psych admissions (x2 in 2023 per [facility], [facility] in Nov 2021); followed by outpt psych NP Wanda Aguillon; no known suicidality/SIB; h/o threatening violence towards staff and PMH of seizure disorder, Parkinson's disease, HTN, T2DM, asthma, hypothyroidism, retinopathy with macular edema, GERD, h/o malignant neoplasm of bladder who was transferred to [facility] ED from her nursing home on 1/11/24 due to agitation and disorganized behavior x 4 days. Psychiatry was consulted for agitation and medication management. Overnight, patient has been irritable and refusing labs/vitals.    Today, patient was oriented x 3 (person, place, date-stated Jan 4, 2024). Interview is limited due to patient disorganization. She reported that staff at her nursing home approached her yesterday in the dining room and told her she needs to "get out right now." Patient is unable to identify why she was transferred to [facility]. She denies any episodes of agitation towards people or property. She describes an episode where a nurse "Zoe" at her facility was blocking her entrance to the elevator and targeting her. She also believes the male  has been targeting her and does not like her. Patient states intent to "beat up" the  and "teach him a lesson," although she says "I wouldn't kill him." She endorses IOR with messages from the TV telling her that she cannot trust the  at [facility]. She denies AVH or recent bizarre experiences. Reports good sleep, appetite, and mood. Patient denies recent depression, S/IIP, SIB, or violence. She has been adherent with medications, which are given to her by staff at the facility. She denies H/IIP directed towards anyone at [facility] and feels safe in the hospital.    Collateral obtained from [facility] Director of Nursing Chantelle ([phone]) and psych NP Wanda Aguillon ([phone]). Patient has recently been acting bizarrely, including walking around with a scarf over her face like a veil and refusing to show her face ("my face looks bad"), standing near the window and talking to herself, and telling the male  who is in a cast "I wish your foot will fall off." She has also been verbally agitated and using racial slurs, which is unlike her baseline when she is pleasant with staff. The morning of transfer to [facility], she made violent threats towards staff and was attempting to hit them. No other recent physical aggression. Current psych meds include seroquel 50 mg PO TID, klonopin 0.5 mg BID (taking for several months), and depakote 500 mg + 750 mg (prescribed for seizures). She has a h/o Parkinson's with shuffling gait and takes sinemet.  72 yo F, living in [facility] for past 8 years, with PPHx schizophrenia, vascular dementia with behavioral disturbance; multiple inpatient psych admissions (x2 in 2023 per [facility], [facility] in Nov 2021); followed by outpt psych NP Wanda Aguillon; no known suicidality/SIB; h/o threatening violence towards staff and PMH of seizure disorder, Parkinson's disease, HTN, T2DM, asthma, hypothyroidism, retinopathy with macular edema, GERD, h/o malignant neoplasm of bladder who was transferred to [facility] ED from her nursing home on 1/11/24 due to agitation and disorganized behavior x 4 days. Psychiatry was consulted for agitation and medication management. Overnight, patient has been irritable and refusing labs/vitals.    Today, patient was oriented x 3 (person, place, date-stated Jan 4, 2024). Interview is limited due to patient disorganization. She reported that staff at her nursing home approached her yesterday in the dining room and told her she needs to "get out right now." Patient is unable to identify why she was transferred to [facility]. She denies any episodes of agitation towards people or property. She describes an episode where a nurse "Zoe" at her facility was blocking her entrance to the elevator . She also believes the male  has been targeting her and does not like her. Patient states 'I will teach him a lesson," although she says "I wouldn't kill him." She endorses IOR with messages from the TV telling her that she cannot trust the  at [facility]. She denies AVH or recent bizarre experiences. Reports good sleep, appetite, and mood. Patient denies recent depression, S/IIP, SIB, or violence. She has been adherent with medications, which are given to her by staff at the facility. She denies H/IIP directed towards anyone at [facility] and feels safe in the hospital.    Collateral obtained from [facility] Director of Nursing Chantelle ([phone]) and psych NP Wanda Aguillon ([phone]). Patient has recently been acting bizarrely, including walking around with a scarf over her face like a veil and refusing to show her face ("my face looks bad"), standing near the window and talking to herself, and telling the male  who is in a cast "I wish your foot will fall off." She has also been verbally agitated and using racial slurs, which is unlike her baseline when she is pleasant with staff. The morning of transfer to [facility], she made violent threats towards staff and was attempting to hit them. No other recent physical aggression. Current psych meds include seroquel 50 mg PO TID, klonopin 0.5 mg BID (taking for several months), and depakote 500 mg + 750 mg (prescribed for seizures). She has a h/o Parkinson's with shuffling gait and takes sinemet.  74 yo F, living in [facility] for past 8 years, with PPHx schizophrenia, vascular dementia with behavioral disturbance; multiple inpatient psych admissions (x2 in 2023 per [facility], [facility] in Nov 2021); followed by outpt psych NP Wanda Aguillon; no known suicidality/SIB; h/o threatening violence towards staff and PMH of seizure disorder, Parkinson's disease, HTN, T2DM, asthma, hypothyroidism, retinopathy with macular edema, GERD, h/o malignant neoplasm of bladder who was transferred to [facility] ED from her nursing home on 1/11/24 due to agitation and disorganized behavior x 4 days. Psychiatry was consulted for agitation and medication management. Overnight, patient has been irritable and refusing labs/vitals.    Today, patient was oriented x 3 (person, place, date-stated Jan 4, 2024). Interview is limited due to patient disorganization. She reported that staff at her nursing home approached her yesterday in the dining room and told her she needs to "get out right now." Patient is unable to identify why she was transferred to [facility]. She denies any episodes of agitation towards people or property. She describes an episode where a nurse "Zoe" at her facility was blocking her entrance to the elevator . She also believes the male  has been targeting her and does not like her. Patient states 'I will teach him a lesson," although she says "I wouldn't kill him." She endorses IOR with messages from the TV telling her that she cannot trust the  at [facility]. She denies AVH or recent bizarre experiences. Reports good sleep, appetite, and mood. Patient denies recent depression, S/IIP, SIB, or violence. She has been adherent with medications, which are given to her by staff at the facility. She denies H/IIP directed towards anyone at [facility] and feels safe in the hospital.    Collateral obtained from [facility] Director of Nursing Chantelle ([phone]) and psych NP Wanda Aguillon ([phone]). Patient has recently been acting bizarrely, including walking around with a scarf over her face like a veil and refusing to show her face ("my face looks bad"), standing near the window and talking to herself, and telling the male  who is in a cast "I wish your foot will fall off." She has also been verbally agitated and using racial slurs, which is unlike her baseline when she is pleasant with staff. The morning of transfer to [facility], she made violent threats towards staff and was attempting to hit them. No other recent physical aggression. Current psych meds include seroquel 50 mg PO TID, klonopin 0.5 mg BID (taking for several months), and depakote 500 mg + 750 mg (prescribed for seizures). She has a h/o Parkinson's with shuffling gait and takes sinemet.

## 2024-01-12 NOTE — BH CONSULTATION LIAISON ASSESSMENT NOTE - OTHER
Chart review; [facility], primary team  involuntary tongue movements lethargic At one point opened eyes and made a roaring noise, otherwise blunted affect mildly increased tone in bilateral UE poor, overall lethargic, odd (roaring noises at one point in interview) briefly squeezed hand (right side) on command otherwise did not follow commands mildly increased tone in bilateral UE, no cogwheeling rigidity, no tremors observed in hands other residents

## 2024-01-12 NOTE — PHYSICAL THERAPY INITIAL EVALUATION ADULT - ADDITIONAL COMMENTS
Pt admitted from nursing home, states she ambulates at times pushing a wheelchair however does not always use it. Pt states she was independent in ADLs.    Following evaluation, pt was left semireclined in bed in no distress, all lines in tact, call bell in reach.

## 2024-01-12 NOTE — BH CONSULTATION LIAISON ASSESSMENT NOTE - NSBHPSYCHMEDSHX_PSY_A_CORE
Name: Sarah Huerta      : 1951      MRN: [id number]  Encounter Provider: NERIS Marina  Encounter Date: 10/26/2022   Encounter department: [address] [place] [address] PRIMARY CARE    Assessment & Plan     1  Dysphagia, unspecified type  -     Ambulatory Referral to Gastroenterology; Future    2  Gastroesophageal reflux disease with esophagitis without hemorrhage  -     Ambulatory Referral to Gastroenterology; Future    3  Nausea  -     famotidine (PEPCID) 20 mg tablet; Take 1 tablet (20 mg total) by mouth 2 (two) times a day    Encouraged him to  his famotidine from the pharmacy  Will refer to GI at our [place] location for further evaluation and testing  Recommended he try melatonin to help with sleeping  Subjective      Here today for an acute visit, he is accompanied by his wife  Reports he is with difficulty swallowing - currently undergoing chemotherapy for a more rare from of skin cancer  Notes the swallowing issue has been ongoing for over a year at different intervals but recently has worsened  He is still have issues with sleeping - insurance will not cover ambien for him  Review of Systems    Current Outpatient Medications on File Prior to Visit   Medication Sig   • aspirin 81 MG tablet Take 81 mg by mouth daily   • B Complex Vitamins (VITAMIN B COMPLEX PO) Take by mouth   • methylPREDNISolone 4 MG tablet therapy pack Use as directed on package   • metroNIDAZOLE (METROCREAM) 0 75 % cream    • Multiple Vitamins-Minerals (MULTIVITAMIN ADULT PO)    • patient supplied medication [facility] Chemo Ointment once daily   • prochlorperazine (COMPAZINE) 10 mg tablet TAKE 1 TABLET BY MOUTH EVERY 6 HOURS AS NEEDED FOR NAUSEA OR VOMITING  • rosuvastatin (CRESTOR) 20 MG tablet TAKE 1 TABLET BY MOUTH EVERY DAY AT NIGHT   • sildenafil (VIAGRA) 100 mg tablet TAKE ONE TABLET BY MOUTH AS NEEDED FOR ED     • zolpidem (AMBIEN) 5 mg tablet Take 1 tablet (5 mg total) by mouth daily at bedtime as needed for sleep   • [DISCONTINUED] famotidine (PEPCID) 20 mg tablet TAKE 1 TABLET BY MOUTH TWICE A DAY   • betamethasone dipropionate (DIPROSONE) 0 05 % ointment PLEASE SEE ATTACHED FOR DETAILED DIRECTIONS (Patient not taking: Reported on 10/26/2022)   • EPINEPHrine (EPIPEN) 0 3 mg/0 3 mL SOAJ Inject 0 3 mL (0 3 mg total) into a muscle once for 1 dose   • lisinopril-hydrochlorothiazide (PRINZIDE,ZESTORETIC) 20-25 MG per tablet Take 1 tablet by mouth daily (Patient not taking: Reported on 10/26/2022)   • [DISCONTINUED] levothyroxine (Euthyrox) 75 mcg tablet Take 1 tablet (75 mcg total) by mouth daily in the early morning       Objective     /70 (BP Location: Left arm, Patient Position: Sitting, Cuff Size: Standard)   Pulse 70   Temp 98 °F (36 7 °C)   Ht 5' 10" (1 778 m)   Wt 99 2 kg (218 lb 9 6 oz)   SpO2 96%   BMI 31 37 kg/m²     Physical Exam  NERIS French yes...

## 2024-01-12 NOTE — DIETITIAN INITIAL EVALUATION ADULT - OTHER INFO
73 year old F with history of vascular dementia, Parkinson's, retinopathy with macular edema, asthma, seizure, hypothyroidism, HLD, GERD, MDD, MI, malignant neoplasm of bladder, anxiety, hydrocephalus, OA, schizophrenia, HTN complicated by with retinopathy who presents from NH ([facility]) for evaluation of agitation (for 4 days per NH). Per NH, the patient was not taking her medications and there was initial concern for UTI. Labs were sig for the following: 10.5/32, RDW 15.3, BG , BUN/Cr 23/1.46, AST/ALT 42/18, EGFR 38,  UA was sig for the following large LE, 6 RBC. CTH showed no hemorrhage, midline shift,  chronic small vessel dz, ventricles prominent  to sulci suggestive of normal pressure hydrocephalus. Per reports from the chart the patient was yelling racial slurs, threatening the staff and being combative. Per reports she became agitated after going to the bathroom, with loud incomprehensible speech.     Pt was attempting to get out of bed and refusing care during visit. Per nursing, Pt refusing medication and remains restless/agitated. Noted completed 51-75% of breakfast meal today per Nursing flowsheet. Pt with visible, mild temporal muscle wasting. No weight noted, and no recent HIE weight trends to report.  73 year old F with history of vascular dementia, Parkinson's, retinopathy with macular edema, asthma, seizure, hypothyroidism, HLD, GERD, MDD, MI, malignant neoplasm of bladder, anxiety, hydrocephalus, OA, schizophrenia, HTN complicated by with retinopathy who presents from NH ([facility]) for evaluation of agitation (for 4 days per NH). Per NH, the patient was not taking her medications and there was initial concern for UTI. Labs were sig for the following: 10.5/32, RDW 15.3, BG , BUN/Cr 23/1.46, AST/ALT 42/18, EGFR 38,  UA was sig for the following large LE, 6 RBC. CTH showed no hemorrhage, midline shift,  chronic small vessel dz, ventricles prominent  to sulci suggestive of normal pressure hydrocephalus. Per reports from the chart the patient was yelling racial slurs, threatening the staff and being combative. Per reports she became agitated after going to the bathroom, with loud incomprehensible speech.     Pt was attempting to get out of bed and refusing care during visit. Per nursing, Pt refusing medication and remains restless/agitated. Noted completed 51-75% of breakfast meal today per Nursing flowsheet. Pt with visible, mild temporal muscle wasting. No weight noted, and no recent HIE weight trends to report.

## 2024-01-12 NOTE — PATIENT PROFILE ADULT - FUNCTIONAL ASSESSMENT - BASIC MOBILITY 6.
2-calculated by average/Not able to assess (calculate score using [facility] averaging method)  2-calculated by average/Not able to assess (calculate score using [facility] averaging method)
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
## 2024-01-12 NOTE — DIETITIAN INITIAL EVALUATION ADULT - ORAL INTAKE PTA/DIET HISTORY
Per [facility] transfer documents, Pt was on Mechanical/Soft die with Mildly Thick Liquids.  Per [facility] transfer documents, Pt was on Mechanical/Soft die with Mildly Thick Liquids.

## 2024-01-12 NOTE — PHYSICAL THERAPY INITIAL EVALUATION ADULT - LEVEL OF INDEPENDENCE: SIT/SUPINE, REHAB EVAL
RD/tear warning symptoms reviewed. F&F brochure given. Call immediately if increase in floaters, flashes, veil, blur. supervision

## 2024-01-12 NOTE — BH CONSULTATION LIAISON ASSESSMENT NOTE - DESCRIPTION
domiciled at [facility] x4 years per chart domiciled at [facility] x4 years per chart
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
## 2024-01-13 LAB
VALPROATE SERPL-MCNC: 38 UG/ML — LOW (ref 50–100)
VALPROATE SERPL-MCNC: 38 UG/ML — LOW (ref 50–100)

## 2024-01-13 PROCEDURE — 99231 SBSQ HOSP IP/OBS SF/LOW 25: CPT

## 2024-01-13 PROCEDURE — 99232 SBSQ HOSP IP/OBS MODERATE 35: CPT

## 2024-01-13 RX ORDER — OLANZAPINE 15 MG/1
1.25 TABLET, FILM COATED ORAL ONCE
Refills: 0 | Status: COMPLETED | OUTPATIENT
Start: 2024-01-13 | End: 2024-01-13

## 2024-01-13 RX ORDER — OLANZAPINE 15 MG/1
2.5 TABLET, FILM COATED ORAL ONCE
Refills: 0 | Status: COMPLETED | OUTPATIENT
Start: 2024-01-13 | End: 2024-01-13

## 2024-01-13 RX ADMIN — OLANZAPINE 1.25 MILLIGRAM(S): 15 TABLET, FILM COATED ORAL at 02:13

## 2024-01-13 RX ADMIN — Medication 75 MICROGRAM(S): at 05:40

## 2024-01-13 RX ADMIN — QUETIAPINE FUMARATE 50 MILLIGRAM(S): 200 TABLET, FILM COATED ORAL at 05:43

## 2024-01-13 RX ADMIN — QUETIAPINE FUMARATE 75 MILLIGRAM(S): 200 TABLET, FILM COATED ORAL at 21:20

## 2024-01-13 RX ADMIN — Medication 250 MILLIGRAM(S): at 21:21

## 2024-01-13 RX ADMIN — VALACYCLOVIR 1000 MILLIGRAM(S): 500 TABLET, FILM COATED ORAL at 17:46

## 2024-01-13 RX ADMIN — QUETIAPINE FUMARATE 50 MILLIGRAM(S): 200 TABLET, FILM COATED ORAL at 13:57

## 2024-01-13 RX ADMIN — Medication 25 MILLIGRAM(S): at 17:13

## 2024-01-13 RX ADMIN — SENNA PLUS 2 TABLET(S): 8.6 TABLET ORAL at 21:21

## 2024-01-13 RX ADMIN — CARBIDOPA AND LEVODOPA 1 TABLET(S): 25; 100 TABLET ORAL at 05:40

## 2024-01-13 RX ADMIN — OLANZAPINE 1.25 MILLIGRAM(S): 15 TABLET, FILM COATED ORAL at 06:55

## 2024-01-13 RX ADMIN — OLANZAPINE 2.5 MILLIGRAM(S): 15 TABLET, FILM COATED ORAL at 04:48

## 2024-01-13 RX ADMIN — Medication 5: at 08:59

## 2024-01-13 RX ADMIN — Medication 250 MILLIGRAM(S): at 13:55

## 2024-01-13 RX ADMIN — Medication 0.5 MILLIGRAM(S): at 17:13

## 2024-01-13 RX ADMIN — VALACYCLOVIR 1000 MILLIGRAM(S): 500 TABLET, FILM COATED ORAL at 05:40

## 2024-01-13 RX ADMIN — CARBIDOPA AND LEVODOPA 1 TABLET(S): 25; 100 TABLET ORAL at 13:55

## 2024-01-13 RX ADMIN — Medication 250 MILLIGRAM(S): at 05:40

## 2024-01-13 RX ADMIN — CARBIDOPA AND LEVODOPA 1 TABLET(S): 25; 100 TABLET ORAL at 21:21

## 2024-01-13 RX ADMIN — HEPARIN SODIUM 5000 UNIT(S): 5000 INJECTION INTRAVENOUS; SUBCUTANEOUS at 17:42
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
## 2024-01-13 NOTE — PROGRESS NOTE ADULT - ASSESSMENT
Ms. Azul is a 73 year old F with history of vascular dementia, Parkinson's, retinopathy with macular edema, asthma, seizure, hypothyroidism, HLD, GERD, MDD, MI, malignant neoplasm of bladder, anxiety, hydrocephalus, OA, schizophrenia, HTN complicated by with retinopathy who presents from NH ([facility]) for evaluation of agitation Ms. Azul is a 73 year old F with history of vascular dementia, Parkinson's, retinopathy with macular edema, asthma, seizure, hypothyroidism, HLD, GERD, MDD, MI, malignant neoplasm of bladder, anxiety, hydrocephalus, OA, schizophrenia, HTN complicated by with retinopathy who presents from NH ([facility]) for evaluation of agitation

## 2024-01-13 NOTE — PROGRESS NOTE ADULT - SUBJECTIVE AND OBJECTIVE BOX
PROGRESS NOTE:     Patient is a 73y old  Female who presents with a chief complaint of agitation, NH requesting psych evaluation (12 Jan 2024 15:27)      SUBJECTIVE / OVERNIGHT EVENTS: overnight patient was agitated    ADDITIONAL REVIEW OF SYSTEMS:    MEDICATIONS  (STANDING):  albuterol/ipratropium for Nebulization 3 milliLiter(s) Nebulizer every 6 hours  ammonium lactate 12% Lotion 1 Application(s) Topical two times a day  carbidopa/levodopa  25/100 1 Tablet(s) Oral three times a day  clonazePAM  Tablet 0.5 milliGRAM(s) Oral two times a day  clotrimazole 1% Vaginal Cream 1 Applicatorful Vaginal two times a day  dextrose 5%. 1000 milliLiter(s) (50 mL/Hr) IV Continuous <Continuous>  dextrose 5%. 1000 milliLiter(s) (100 mL/Hr) IV Continuous <Continuous>  dextrose 50% Injectable 25 Gram(s) IV Push once  dextrose 50% Injectable 12.5 Gram(s) IV Push once  dextrose 50% Injectable 25 Gram(s) IV Push once  glucagon  Injectable 1 milliGRAM(s) IntraMuscular once  heparin   Injectable 5000 Unit(s) SubCutaneous every 12 hours  hydrocortisone 2.5% Ointment 1 Application(s) Topical two times a day  insulin lispro (ADMELOG) corrective regimen sliding scale   SubCutaneous three times a day before meals  levothyroxine 75 MICROGram(s) Oral daily  lisinopril 20 milliGRAM(s) Oral daily  metoprolol tartrate 25 milliGRAM(s) Oral two times a day  polyethylene glycol 3350 17 Gram(s) Oral every 12 hours  QUEtiapine 75 milliGRAM(s) Oral at bedtime  QUEtiapine 50 milliGRAM(s) Oral <User Schedule>  senna 2 Tablet(s) Oral at bedtime  valACYclovir 1000 milliGRAM(s) Oral every 12 hours  valproic acid 250 milliGRAM(s) Oral every 8 hours    MEDICATIONS  (PRN):  dextrose Oral Gel 15 Gram(s) Oral once PRN Blood Glucose LESS THAN 70 milliGRAM(s)/deciliter  OLANZapine Injectable 2.5 milliGRAM(s) IntraMuscular every 8 hours PRN agitation      CAPILLARY BLOOD GLUCOSE      POCT Blood Glucose.: 114 mg/dL (13 Jan 2024 12:53)  POCT Blood Glucose.: 62 mg/dL (13 Jan 2024 12:36)  POCT Blood Glucose.: 63 mg/dL (13 Jan 2024 12:22)  POCT Blood Glucose.: 352 mg/dL (13 Jan 2024 08:19)  POCT Blood Glucose.: 129 mg/dL (12 Jan 2024 22:07)    I&O's Summary      PHYSICAL EXAM:  Vital Signs Last 24 Hrs  T(C): 36.1 (13 Jan 2024 12:30), Max: 37.1 (12 Jan 2024 22:40)  T(F): 97 (13 Jan 2024 12:30), Max: 98.8 (12 Jan 2024 22:40)  HR: 76 (13 Jan 2024 16:41) (69 - 76)  BP: 147/75 (13 Jan 2024 16:41) (114/72 - 147/75)  BP(mean): --  RR: 17 (13 Jan 2024 16:41) (16 - 18)  SpO2: 98% (13 Jan 2024 12:30) (98% - 98%)    Parameters below as of 13 Jan 2024 12:30  Patient On (Oxygen Delivery Method): room air        CONSTITUTIONAL: agitated  RESPIRATORY: Normal respiratory effort; lungs are clear to auscultation bilaterally  CARDIOVASCULAR: Regular rate and rhythm, normal S1 and S2, no murmur/rub/gallop;  ABDOMEN: Nontender to palpation, normoactive bowel sounds, no rebound/guarding; No hepatosplenomegaly  MUSCLOSKELETAL: no clubbing or cyanosis of digits; no joint swelling or tenderness to palpation  PSYCH: disorganized, aggressive, agitated    LABS:                        10.9   5.42  )-----------( 216      ( 12 Jan 2024 12:40 )             34.7     01-12    144  |  110<H>  |  15  ----------------------------<  99  4.9   |  25  |  1.05    Ca    9.0      12 Jan 2024 12:40  Phos  2.5     01-12  Mg     2.10     01-12            Urinalysis Basic - ( 12 Jan 2024 12:40 )    Color: x / Appearance: x / SG: x / pH: x  Gluc: 99 mg/dL / Ketone: x  / Bili: x / Urobili: x   Blood: x / Protein: x / Nitrite: x   Leuk Esterase: x / RBC: x / WBC x   Sq Epi: x / Non Sq Epi: x / Bacteria: x        Culture - Urine (collected 11 Jan 2024 10:00)  Source: Clean Catch Clean Catch (Midstream)  Final Report (12 Jan 2024 23:44):    >=3 organisms. Probable collection contamination.        RADIOLOGY & ADDITIONAL TESTS:  Results Reviewed:   Imaging Personally Reviewed:  Electrocardiogram Personally Reviewed:    COORDINATION OF CARE:  Care Discussed with Consultants/Other Providers [Y/N]:  Prior or Outpatient Records Reviewed [Y/N]:

## 2024-01-13 NOTE — PROVIDER CONTACT NOTE (HYPOGLYCEMIA EVENT) - NS PROVIDER CONTACT BACKGROUND-HYPO
Age: 73y    Gender: Female    POCT Blood Glucose:  114 mg/dL (01-13-24 @ 12:53)  62 mg/dL (01-13-24 @ 12:36)  63 mg/dL (01-13-24 @ 12:22)  352 mg/dL (01-13-24 @ 08:19)  129 mg/dL (01-12-24 @ 22:07)      eMAR:  insulin lispro (ADMELOG) corrective regimen sliding scale   5 Unit(s) SubCutaneous (01-13-24 @ 08:59)    levothyroxine   75 MICROGram(s) Oral (01-13-24 @ 05:40)

## 2024-01-14 LAB
ANION GAP SERPL CALC-SCNC: 11 MMOL/L — SIGNIFICANT CHANGE UP (ref 7–14)
ANION GAP SERPL CALC-SCNC: 11 MMOL/L — SIGNIFICANT CHANGE UP (ref 7–14)
BUN SERPL-MCNC: 14 MG/DL — SIGNIFICANT CHANGE UP (ref 7–23)
BUN SERPL-MCNC: 14 MG/DL — SIGNIFICANT CHANGE UP (ref 7–23)
CALCIUM SERPL-MCNC: 9.8 MG/DL — SIGNIFICANT CHANGE UP (ref 8.4–10.5)
CALCIUM SERPL-MCNC: 9.8 MG/DL — SIGNIFICANT CHANGE UP (ref 8.4–10.5)
CHLORIDE SERPL-SCNC: 106 MMOL/L — SIGNIFICANT CHANGE UP (ref 98–107)
CHLORIDE SERPL-SCNC: 106 MMOL/L — SIGNIFICANT CHANGE UP (ref 98–107)
CO2 SERPL-SCNC: 25 MMOL/L — SIGNIFICANT CHANGE UP (ref 22–31)
CO2 SERPL-SCNC: 25 MMOL/L — SIGNIFICANT CHANGE UP (ref 22–31)
CREAT SERPL-MCNC: 0.98 MG/DL — SIGNIFICANT CHANGE UP (ref 0.5–1.3)
CREAT SERPL-MCNC: 0.98 MG/DL — SIGNIFICANT CHANGE UP (ref 0.5–1.3)
EGFR: 62 ML/MIN/1.73M2 — SIGNIFICANT CHANGE UP
EGFR: 62 ML/MIN/1.73M2 — SIGNIFICANT CHANGE UP
GLUCOSE SERPL-MCNC: 99 MG/DL — SIGNIFICANT CHANGE UP (ref 70–99)
GLUCOSE SERPL-MCNC: 99 MG/DL — SIGNIFICANT CHANGE UP (ref 70–99)
HCT VFR BLD CALC: 36.2 % — SIGNIFICANT CHANGE UP (ref 34.5–45)
HCT VFR BLD CALC: 36.2 % — SIGNIFICANT CHANGE UP (ref 34.5–45)
HGB BLD-MCNC: 11.4 G/DL — LOW (ref 11.5–15.5)
HGB BLD-MCNC: 11.4 G/DL — LOW (ref 11.5–15.5)
MAGNESIUM SERPL-MCNC: 1.9 MG/DL — SIGNIFICANT CHANGE UP (ref 1.6–2.6)
MAGNESIUM SERPL-MCNC: 1.9 MG/DL — SIGNIFICANT CHANGE UP (ref 1.6–2.6)
MCHC RBC-ENTMCNC: 26.6 PG — LOW (ref 27–34)
MCHC RBC-ENTMCNC: 26.6 PG — LOW (ref 27–34)
MCHC RBC-ENTMCNC: 31.5 GM/DL — LOW (ref 32–36)
MCHC RBC-ENTMCNC: 31.5 GM/DL — LOW (ref 32–36)
MCV RBC AUTO: 84.6 FL — SIGNIFICANT CHANGE UP (ref 80–100)
MCV RBC AUTO: 84.6 FL — SIGNIFICANT CHANGE UP (ref 80–100)
NRBC # BLD: 0 /100 WBCS — SIGNIFICANT CHANGE UP (ref 0–0)
NRBC # BLD: 0 /100 WBCS — SIGNIFICANT CHANGE UP (ref 0–0)
NRBC # FLD: 0 K/UL — SIGNIFICANT CHANGE UP (ref 0–0)
NRBC # FLD: 0 K/UL — SIGNIFICANT CHANGE UP (ref 0–0)
PHOSPHATE SERPL-MCNC: 3.8 MG/DL — SIGNIFICANT CHANGE UP (ref 2.5–4.5)
PHOSPHATE SERPL-MCNC: 3.8 MG/DL — SIGNIFICANT CHANGE UP (ref 2.5–4.5)
PLATELET # BLD AUTO: 208 K/UL — SIGNIFICANT CHANGE UP (ref 150–400)
PLATELET # BLD AUTO: 208 K/UL — SIGNIFICANT CHANGE UP (ref 150–400)
POTASSIUM SERPL-MCNC: 4.8 MMOL/L — SIGNIFICANT CHANGE UP (ref 3.5–5.3)
POTASSIUM SERPL-MCNC: 4.8 MMOL/L — SIGNIFICANT CHANGE UP (ref 3.5–5.3)
POTASSIUM SERPL-SCNC: 4.8 MMOL/L — SIGNIFICANT CHANGE UP (ref 3.5–5.3)
POTASSIUM SERPL-SCNC: 4.8 MMOL/L — SIGNIFICANT CHANGE UP (ref 3.5–5.3)
RBC # BLD: 4.28 M/UL — SIGNIFICANT CHANGE UP (ref 3.8–5.2)
RBC # BLD: 4.28 M/UL — SIGNIFICANT CHANGE UP (ref 3.8–5.2)
RBC # FLD: 14.8 % — HIGH (ref 10.3–14.5)
RBC # FLD: 14.8 % — HIGH (ref 10.3–14.5)
SODIUM SERPL-SCNC: 142 MMOL/L — SIGNIFICANT CHANGE UP (ref 135–145)
SODIUM SERPL-SCNC: 142 MMOL/L — SIGNIFICANT CHANGE UP (ref 135–145)
VALPROATE SERPL-MCNC: 33.2 UG/ML — LOW (ref 50–100)
VALPROATE SERPL-MCNC: 33.2 UG/ML — LOW (ref 50–100)
WBC # BLD: 5.42 K/UL — SIGNIFICANT CHANGE UP (ref 3.8–10.5)
WBC # BLD: 5.42 K/UL — SIGNIFICANT CHANGE UP (ref 3.8–10.5)
WBC # FLD AUTO: 5.42 K/UL — SIGNIFICANT CHANGE UP (ref 3.8–10.5)
WBC # FLD AUTO: 5.42 K/UL — SIGNIFICANT CHANGE UP (ref 3.8–10.5)

## 2024-01-14 PROCEDURE — 99222 1ST HOSP IP/OBS MODERATE 55: CPT

## 2024-01-14 PROCEDURE — 99232 SBSQ HOSP IP/OBS MODERATE 35: CPT

## 2024-01-14 PROCEDURE — 93010 ELECTROCARDIOGRAM REPORT: CPT

## 2024-01-14 RX ORDER — OLANZAPINE 15 MG/1
2.5 TABLET, FILM COATED ORAL ONCE
Refills: 0 | Status: DISCONTINUED | OUTPATIENT
Start: 2024-01-14 | End: 2024-01-14

## 2024-01-14 RX ORDER — OLANZAPINE 15 MG/1
2.5 TABLET, FILM COATED ORAL ONCE
Refills: 0 | Status: COMPLETED | OUTPATIENT
Start: 2024-01-14 | End: 2024-01-14

## 2024-01-14 RX ORDER — OLANZAPINE 15 MG/1
2.5 TABLET, FILM COATED ORAL ONCE
Refills: 0 | Status: COMPLETED | OUTPATIENT
Start: 2024-01-14 | End: 2024-01-15

## 2024-01-14 RX ORDER — QUETIAPINE FUMARATE 200 MG/1
75 TABLET, FILM COATED ORAL EVERY 8 HOURS
Refills: 0 | Status: DISCONTINUED | OUTPATIENT
Start: 2024-01-14 | End: 2024-01-16

## 2024-01-14 RX ORDER — OLANZAPINE 15 MG/1
5 TABLET, FILM COATED ORAL EVERY 6 HOURS
Refills: 0 | Status: DISCONTINUED | OUTPATIENT
Start: 2024-01-14 | End: 2024-01-17

## 2024-01-14 RX ADMIN — OLANZAPINE 2.5 MILLIGRAM(S): 15 TABLET, FILM COATED ORAL at 07:44

## 2024-01-14 RX ADMIN — Medication 25 MILLIGRAM(S): at 06:40

## 2024-01-14 RX ADMIN — OLANZAPINE 5 MILLIGRAM(S): 15 TABLET, FILM COATED ORAL at 15:40

## 2024-01-14 RX ADMIN — OLANZAPINE 2.5 MILLIGRAM(S): 15 TABLET, FILM COATED ORAL at 11:07

## 2024-01-14 RX ADMIN — QUETIAPINE FUMARATE 75 MILLIGRAM(S): 200 TABLET, FILM COATED ORAL at 23:15

## 2024-01-14 RX ADMIN — Medication 1 APPLICATION(S): at 19:39

## 2024-01-14 RX ADMIN — VALACYCLOVIR 1000 MILLIGRAM(S): 500 TABLET, FILM COATED ORAL at 06:39

## 2024-01-14 RX ADMIN — Medication 1 APPLICATION(S): at 06:38

## 2024-01-14 RX ADMIN — POLYETHYLENE GLYCOL 3350 17 GRAM(S): 17 POWDER, FOR SOLUTION ORAL at 18:14

## 2024-01-14 RX ADMIN — VALACYCLOVIR 1000 MILLIGRAM(S): 500 TABLET, FILM COATED ORAL at 18:14

## 2024-01-14 RX ADMIN — Medication 75 MICROGRAM(S): at 06:40

## 2024-01-14 RX ADMIN — CARBIDOPA AND LEVODOPA 1 TABLET(S): 25; 100 TABLET ORAL at 06:40

## 2024-01-14 RX ADMIN — LISINOPRIL 20 MILLIGRAM(S): 2.5 TABLET ORAL at 06:40

## 2024-01-14 RX ADMIN — HEPARIN SODIUM 5000 UNIT(S): 5000 INJECTION INTRAVENOUS; SUBCUTANEOUS at 18:12

## 2024-01-14 RX ADMIN — Medication 250 MILLIGRAM(S): at 23:00

## 2024-01-14 RX ADMIN — Medication 0.5 MILLIGRAM(S): at 06:40

## 2024-01-14 RX ADMIN — Medication 1 APPLICATION(S): at 06:39

## 2024-01-14 RX ADMIN — Medication 250 MILLIGRAM(S): at 06:39

## 2024-01-14 RX ADMIN — HEPARIN SODIUM 5000 UNIT(S): 5000 INJECTION INTRAVENOUS; SUBCUTANEOUS at 06:40

## 2024-01-14 RX ADMIN — CARBIDOPA AND LEVODOPA 1 TABLET(S): 25; 100 TABLET ORAL at 23:17

## 2024-01-14 RX ADMIN — SENNA PLUS 2 TABLET(S): 8.6 TABLET ORAL at 23:16

## 2024-01-14 RX ADMIN — Medication 1 APPLICATORFUL: at 19:39

## 2024-01-14 RX ADMIN — POLYETHYLENE GLYCOL 3350 17 GRAM(S): 17 POWDER, FOR SOLUTION ORAL at 06:41

## 2024-01-14 RX ADMIN — QUETIAPINE FUMARATE 50 MILLIGRAM(S): 200 TABLET, FILM COATED ORAL at 06:40

## 2024-01-14 RX ADMIN — Medication 25 MILLIGRAM(S): at 18:12

## 2024-01-14 RX ADMIN — Medication 0.5 MILLIGRAM(S): at 18:12

## 2024-01-14 NOTE — CONSULT NOTE ADULT - ASSESSMENT
HPI: Patient SIMEON BLOOM is a 73y (1950) woman with a PMHx significant for vascular dementia, Parkinson's, retinopathy with macular edema, asthma, seizure, hypothyroidism, HLD, GERD, MDD, MI, malignant neoplasm of bladder, anxiety, known hydrocephalus, OA, schizophrenia, HTN complicated by with retinopathy who presents from NH ([facility]) for evaluation of agitation Jan 11 2024. . CTH venticulomgealy out of proportion to sulci, similar to 2021 scan. Unclear gait/urinary symptoms.     Impression: Agitation iso schizophrenia. CTH c/f NPH unchanged from prior (Dec 2021)    Recommendations  []Medically manage agitation as you are  []Continue Home parkinson's meds   []Continue Home seizure meds  []Can consider outpatient NPH workup at later date: Can Refer to outpatient Neurology for NPH diagnostic evaluation, where patient can have formal neuropsychological assessment as well as high-volume lumbar puncture with timed pre-LP and post-LP cognitive and gait assessment.    Patient can follow-up with any of the following:    Neurology  Dr. Stephanie Coe MD.   [address], [place].   [phone].    Neurology  Dr. Stephen Curtis MD.   [address], [place].   [phone].    Neurosurgery   Dr. Maximus Das MD.   [address], [place].   [phone].    Case and plan above discussed with primary team.  Please do not hesitate to call back with any issues, questions, or concerns.    -  Jamari Shen MD  PGY-3 Neurology  Spectra #[phone]. HPI: Patient SIMEON BLOOM is a 73y (1950) woman with a PMHx significant for vascular dementia, Parkinson's, retinopathy with macular edema, asthma, seizure, hypothyroidism, HLD, GERD, MDD, MI, malignant neoplasm of bladder, anxiety, known hydrocephalus, OA, schizophrenia, HTN complicated by with retinopathy who presents from NH ([facility]) for evaluation of agitation Jan 11 2024. . CTH venticulomgealy out of proportion to sulci, similar to 2021 scan. Unclear gait/urinary symptoms.     Impression: Agitation iso schizophrenia. CTH c/f NPH unchanged from prior (Dec 2021)    Recommendations  []Medically manage agitation as you are  []Continue Home parkinson's meds   []Continue Home seizure meds  []Can consider outpatient NPH workup at later date: Can Refer to outpatient Neurology for NPH diagnostic evaluation, where patient can have formal neuropsychological assessment as well as high-volume lumbar puncture with timed pre-LP and post-LP cognitive and gait assessment.    Patient can follow-up with any of the following:    Neurology  Dr. Stephanie Coe MD.   [address], [place].   [phone].    Neurology  Dr. Stephen Curtis MD.   [address] [place], [place].   [phone].    Neurosurgery   Dr. Maximus Das MD.   [address], [place].   [phone].    Case and plan above discussed with primary team.  Please do not hesitate to call back with any issues, questions, or concerns.    -  Jamari Shen MD  PGY-3 Neurology  Spectra #[phone]. HPI: Patient SIMEON BLOOM is a 73y (1950) woman with a PMHx significant for vascular dementia, Parkinson's, retinopathy with macular edema, asthma, seizure, hypothyroidism, HLD, GERD, MDD, MI, malignant neoplasm of bladder, anxiety, known hydrocephalus, OA, schizophrenia, HTN complicated by with retinopathy who presents from NH ([facility]) for evaluation of agitation Jan 11 2024. . CTH venticulomgealy out of proportion to sulci, similar to 2021 scan. Unclear gait/urinary symptoms.     Impression: Agitation iso schizophrenia. CTH c/f NPH unchanged from prior (Dec 2021) Anisocoria without decreased level of consciousness nor other brainstem findings likely chronic    Recommendations  []Medically manage agitation as you are  []Continue Home parkinson's meds   []Continue Home seizure meds  []Can consider outpatient NPH workup at later date: Can Refer to outpatient Neurology for NPH diagnostic evaluation, where patient can have formal neuropsychological assessment as well as high-volume lumbar puncture with timed pre-LP and post-LP cognitive and gait assessment.    Patient can follow-up with any of the following:    Neurology  Dr. Stephanie Coe MD.   [address], [place].   [phone].    Neurology  Dr. Stephen Curtis MD.   [address], [place].   [phone].    Neurosurgery   Dr. Maximus Das MD.   [address], [place].   [phone].    Case and plan above discussed with primary team.  Please do not hesitate to call back with any issues, questions, or concerns.    -  Jamari Shen MD  PGY-3 Neurology  Spectra #[phone]. HPI: Patient SIMEON BLOOM is a 73y (1950) woman with a PMHx significant for vascular dementia, Parkinson's, retinopathy with macular edema, asthma, seizure, hypothyroidism, HLD, GERD, MDD, MI, malignant neoplasm of bladder, anxiety, known hydrocephalus, OA, schizophrenia, HTN complicated by with retinopathy who presents from NH ([facility]) for evaluation of agitation Jan 11 2024. . CTH venticulomgealy out of proportion to sulci, similar to 2021 scan. Unclear gait/urinary symptoms.     Impression: Agitation iso schizophrenia. CTH c/f NPH unchanged from prior (Dec 2021) Anisocoria without decreased level of consciousness nor other brainstem findings likely chronic    Recommendations  []Medically manage agitation as you are  []Continue Home parkinson's meds   []Continue Home seizure meds  []Can consider outpatient NPH workup at later date: Can Refer to outpatient Neurology for NPH diagnostic evaluation, where patient can have formal neuropsychological assessment as well as high-volume lumbar puncture with timed pre-LP and post-LP cognitive and gait assessment.    Patient can follow-up with any of the following:    Neurology  Dr. Stephanie Coe MD.   [address], [place].   [phone].    Neurology  Dr. Stephen Curtis MD.   [address], [place].   [phone].    Neurosurgery   Dr. Maximus Das MD.   [address], [place].   [phone].    Case and plan above discussed with primary team.  Please do not hesitate to call back with any issues, questions, or concerns.    -  Jamari Shen MD  PGY-3 Neurology  Spectra #[phone].

## 2024-01-14 NOTE — BH CONSULTATION LIAISON PROGRESS NOTE - NSBHASSESSMENTFT_PSY_ALL_CORE
74 yo F, living in [facility] for past 8 years, with PPHx schizophrenia, vascular dementia with behavioral disturbance; multiple inpatient psych admissions (x2 in 2023 per NH, [facility] in Nov 2021); followed by outpt psych NP Wanda Aguillon; no known suicidality/SIB; h/o threatening violence towards staff and PMH of seizure disorder, Parkinson's disease, HTN, T2DM, asthma, hypothyroidism, retinopathy with macular edema, GERD, h/o malignant neoplasm of bladder who was transferred to [facility] ED from her nursing home on 1/11/24 due to agitation and disorganized behavior x 4 days. Psychiatry was consulted for agitation and medication management. Overnight, patient has been irritable and refusing labs/vitals.    Today, patient was oriented x 3 (person, place, date-stated Jan 4, 2024). She was irritable with paranoia towards nursing home staff and IOR, but overall in behavioral control. Collateral obtained from nursing home staff, who confirmed she has been more impulsive and aggressive x 4 days. DDx includes psychotic decompensation vs dementia with behavioral disturbance vs superimposed delirium (UA +large LEs). Will observe patient's behavior on the unit to determine disposition.    1/14: Patient with CLIFFORD code this morning, spitting and aggressive. Per team, patient agitated throughout the day and minimally responsive to Zyprexa 2.5 mg IM/IV. Recommend to increase agitation regimen to Zyprexa 5 mg IM/IV q6h for agitation.     Plan:  - Started 1:1 CO for agitation and disorganization, CLIFFORD and attempting to harm staff   - PRNs: Zyprexa 5 mg PO/IM q6h as needed for severe agitation.   -- Avoid benzodiazepines if possible to avoid worsening dementia/delirium. Do not give IM/IV Ativan within 1 hour of IM Zyprexa. Ensure QTc < 500 ms. Avoid first generation antipsychotics (ex: Haldol) due to h/o Parkinson's disease.  - INCREASE Seroquel to 75 mg in AM, 75 mg in afternoon, and 75 mg HS for psychosis. Ensure QTc < 500 ms.   - Continue Klonopin 0.5 mg BID for agitation/anxiety (home med)  - Defer Depakote dose to medicine/neurology, prescribed for seizures  -- Depakote level 33.20 1/14  -- can consider adjusting if needed fo behavioral control   - Dispo: pending further assessment   74 yo F, living in [facility] for past 8 years, with PPHx schizophrenia, vascular dementia with behavioral disturbance; multiple inpatient psych admissions (x2 in 2023 per [facility], [facility] in Nov 2021); followed by outpt psych NP Wanda Aguillon; no known suicidality/SIB; h/o threatening violence towards staff and PMH of seizure disorder, Parkinson's disease, HTN, T2DM, asthma, hypothyroidism, retinopathy with macular edema, GERD, h/o malignant neoplasm of bladder who was transferred to [facility] ED from her nursing home on 1/11/24 due to agitation and disorganized behavior x 4 days. Psychiatry was consulted for agitation and medication management. Overnight, patient has been irritable and refusing labs/vitals.    Today, patient was oriented x 3 (person, place, date-stated Jan 4, 2024). She was irritable with paranoia towards nursing home staff and IOR, but overall in behavioral control. Collateral obtained from nursing home staff, who confirmed she has been more impulsive and aggressive x 4 days. DDx includes psychotic decompensation vs dementia with behavioral disturbance vs superimposed delirium (UA +large LEs). Will observe patient's behavior on the unit to determine disposition.    1/14: Patient with CLIFFORD code this morning, spitting and aggressive. Per team, patient agitated throughout the day and minimally responsive to Zyprexa 2.5 mg IM/IV. Recommend to increase agitation regimen to Zyprexa 5 mg IM/IV q6h for agitation.     Plan:  - Started 1:1 CO for agitation and disorganization, CLIFFORD and attempting to harm staff   - PRNs: Zyprexa 5 mg PO/IM q6h as needed for severe agitation.   -- Avoid benzodiazepines if possible to avoid worsening dementia/delirium. Do not give IM/IV Ativan within 1 hour of IM Zyprexa. Ensure QTc < 500 ms. Avoid first generation antipsychotics (ex: Haldol) due to h/o Parkinson's disease.  - INCREASE Seroquel to 75 mg in AM, 75 mg in afternoon, and 75 mg HS for psychosis. Ensure QTc < 500 ms.   - Continue Klonopin 0.5 mg BID for agitation/anxiety (home med)  - Defer Depakote dose to medicine/neurology, prescribed for seizures  -- Depakote level 33.20 1/14  -- can consider adjusting if needed fo behavioral control   - Dispo: pending further assessment   74 yo F, living in [facility] for past 8 years, with PPHx schizophrenia, vascular dementia with behavioral disturbance; multiple inpatient psych admissions (x2 in 2023 per NH, [facility] in Nov 2021); followed by outpt psych NP Wanda Aguillon; no known suicidality/SIB; h/o threatening violence towards staff and PMH of seizure disorder, Parkinson's disease, HTN, T2DM, asthma, hypothyroidism, retinopathy with macular edema, GERD, h/o malignant neoplasm of bladder who was transferred to [facility] ED from her nursing home on 1/11/24 due to agitation and disorganized behavior x 4 days. Psychiatry was consulted for agitation and medication management. Overnight, patient has been irritable and refusing labs/vitals.    Today, patient was oriented x 3 (person, place, date-stated Jan 4, 2024). She was irritable with paranoia towards nursing home staff and IOR, but overall in behavioral control. Collateral obtained from nursing home staff, who confirmed she has been more impulsive and aggressive x 4 days. DDx includes psychotic decompensation vs dementia with behavioral disturbance vs superimposed delirium (UA +large LEs). Will observe patient's behavior on the unit to determine disposition.    1/14: Patient with CLIFFORD code this morning, spitting and aggressive. Per team, patient agitated throughout the day and minimally responsive to Zyprexa 2.5 mg IM/IV. Recommend to increase agitation regimen to Zyprexa 5 mg IM/IV q6h for agitation. Also, increase Seroquel 75 mg TID STANDING.    Plan:  - Started 1:1 CO for agitation and disorganization, CLIFFORD and attempting to harm staff   - PRNs: Zyprexa 5 mg PO/IM q6h as needed for severe agitation.   -- Avoid benzodiazepines if possible to avoid worsening dementia/delirium. Do not give IM/IV Ativan within 1 hour of IM Zyprexa. Ensure QTc < 500 ms. Avoid first generation antipsychotics (ex: Haldol) due to h/o Parkinson's disease.  - INCREASE Seroquel to 75 mg in AM, 75 mg in afternoon, and 75 mg HS for psychosis. Ensure QTc < 500 ms.   - Continue Klonopin 0.5 mg BID for agitation/anxiety (home med)  - Defer Depakote dose to medicine/neurology, prescribed for seizures  -- Depakote level 33.20 1/14  -- can consider adjusting if needed fo behavioral control   - Dispo: pending further assessment   72 yo F, living in [facility] for past 8 years, with PPHx schizophrenia, vascular dementia with behavioral disturbance; multiple inpatient psych admissions (x2 in 2023 per NH, [facility] in Nov 2021); followed by outpt psych NP Wanda Aguillon; no known suicidality/SIB; h/o threatening violence towards staff and PMH of seizure disorder, Parkinson's disease, HTN, T2DM, asthma, hypothyroidism, retinopathy with macular edema, GERD, h/o malignant neoplasm of bladder who was transferred to [facility] ED from her nursing home on 1/11/24 due to agitation and disorganized behavior x 4 days. Psychiatry was consulted for agitation and medication management. Overnight, patient has been irritable and refusing labs/vitals.    Today, patient was oriented x 3 (person, place, date-stated Jan 4, 2024). She was irritable with paranoia towards nursing home staff and IOR, but overall in behavioral control. Collateral obtained from nursing home staff, who confirmed she has been more impulsive and aggressive x 4 days. DDx includes psychotic decompensation vs dementia with behavioral disturbance vs superimposed delirium (UA +large LEs). Will observe patient's behavior on the unit to determine disposition.    1/14: Patient with CLIFFORD code this morning, spitting and aggressive. Per team, patient agitated throughout the day and minimally responsive to Zyprexa 2.5 mg IM/IV. Recommend to increase agitation regimen to Zyprexa 5 mg IM/IV q6h for agitation. Also, increase Seroquel 75 mg TID STANDING.    Plan:  - Started 1:1 CO for agitation and disorganization, CLIFFORD and attempting to harm staff   - PRNs: Zyprexa 5 mg PO/IM q6h as needed for severe agitation.   -- Avoid benzodiazepines if possible to avoid worsening dementia/delirium. Do not give IM/IV Ativan within 1 hour of IM Zyprexa. Ensure QTc < 500 ms. Avoid first generation antipsychotics (ex: Haldol) due to h/o Parkinson's disease.  - INCREASE Seroquel to 75 mg in AM, 75 mg in afternoon, and 75 mg HS for psychosis. Ensure QTc < 500 ms.   - Continue Klonopin 0.5 mg BID for agitation/anxiety (home med)  - Defer Depakote dose to medicine/neurology, prescribed for seizures  -- Depakote level 33.20 1/14  -- can consider adjusting if needed fo behavioral control   - Dispo: pending further assessment   74 yo F, living in [facility] for past 8 years, with PPHx schizophrenia, vascular dementia with behavioral disturbance; multiple inpatient psych admissions (x2 in 2023 per [facility], [facility] in Nov 2021); followed by outpt psych NP Wanda Aguillon; no known suicidality/SIB; h/o threatening violence towards staff and PMH of seizure disorder, Parkinson's disease, HTN, T2DM, asthma, hypothyroidism, retinopathy with macular edema, GERD, h/o malignant neoplasm of bladder who was transferred to [facility] ED from her nursing home on 1/11/24 due to agitation and disorganized behavior x 4 days. Psychiatry was consulted for agitation and medication management. Overnight, patient has been irritable and refusing labs/vitals.    Today, patient was oriented x 3 (person, place, date-stated Jan 4, 2024). She was irritable with paranoia towards nursing home staff and IOR, but overall in behavioral control. Collateral obtained from nursing home staff, who confirmed she has been more impulsive and aggressive x 4 days. DDx includes psychotic decompensation vs dementia with behavioral disturbance vs superimposed delirium (UA +large LEs). Will observe patient's behavior on the unit to determine disposition.    1/14: Patient with CLIFFORD code this morning, spitting and aggressive. Per team, patient agitated throughout the day and minimally responsive to Zyprexa 2.5 mg IM/IV. Recommend to increase agitation regimen to Zyprexa 5 mg IM/IV q6h for agitation. Also, increase Seroquel 75 mg TID STANDING.    Plan:  - 1:1 CO for agitation and disorganization, CLIFFORD and attempting to harm staff   - PRNs: Zyprexa 5 mg PO/IM q6h as needed for severe agitation.   -- Avoid benzodiazepines if possible to avoid worsening dementia/delirium. Do not give IM/IV Ativan within 1 hour of IM Zyprexa. Ensure QTc < 500 ms. Avoid first generation antipsychotics (ex: Haldol) due to h/o Parkinson's disease.  - INCREASE Seroquel to 75 mg in AM, 75 mg in afternoon, and 75 mg HS for psychosis. Ensure QTc < 500 ms.   - Continue Klonopin 0.5 mg BID for agitation/anxiety (home med)  - Defer Depakote dose to medicine/neurology, prescribed for seizures  -- Depakote level 33.20 1/14  -- can consider adjusting if needed fo behavioral control   - Dispo: pending further assessment   74 yo F, living in [facility] for past 8 years, with PPHx schizophrenia, vascular dementia with behavioral disturbance; multiple inpatient psych admissions (x2 in 2023 per [facility], [facility] in Nov 2021); followed by outpt psych NP Wanda Aguillon; no known suicidality/SIB; h/o threatening violence towards staff and PMH of seizure disorder, Parkinson's disease, HTN, T2DM, asthma, hypothyroidism, retinopathy with macular edema, GERD, h/o malignant neoplasm of bladder who was transferred to [facility] ED from her nursing home on 1/11/24 due to agitation and disorganized behavior x 4 days. Psychiatry was consulted for agitation and medication management. Overnight, patient has been irritable and refusing labs/vitals.    Today, patient was oriented x 3 (person, place, date-stated Jan 4, 2024). She was irritable with paranoia towards nursing home staff and IOR, but overall in behavioral control. Collateral obtained from nursing home staff, who confirmed she has been more impulsive and aggressive x 4 days. DDx includes psychotic decompensation vs dementia with behavioral disturbance vs superimposed delirium (UA +large LEs). Will observe patient's behavior on the unit to determine disposition.    1/14: Patient with CLIFFORD code this morning, spitting and aggressive. Per team, patient agitated throughout the day and minimally responsive to Zyprexa 2.5 mg IM/IV. Recommend to increase agitation regimen to Zyprexa 5 mg IM/IV q6h for agitation. Also, increase Seroquel 75 mg TID STANDING.    Plan:  - 1:1 CO for agitation and disorganization, CLIFFORD and attempting to harm staff   - PRNs: Zyprexa 5 mg PO/IM q6h as needed for severe agitation.   -- Avoid benzodiazepines if possible to avoid worsening dementia/delirium. Do not give IM/IV Ativan within 1 hour of IM Zyprexa. Ensure QTc < 500 ms. Avoid first generation antipsychotics (ex: Haldol) due to h/o Parkinson's disease.  - INCREASE Seroquel to 75 mg in AM, 75 mg in afternoon, and 75 mg HS for psychosis. Ensure QTc < 500 ms.   - Continue Klonopin 0.5 mg BID for agitation/anxiety (home med)  - Defer Depakote dose to medicine/neurology, prescribed for seizures  -- Depakote level 33.20 1/14  -- can consider adjusting if needed fo behavioral control   - Dispo: pending further assessment

## 2024-01-14 NOTE — CONSULT NOTE ADULT - ATTENDING COMMENTS
Patient is a poor historian and tangential throughout interview; history obtained from chart review. In short, she is a 73F with PMH of vascular dementia, Parkinson's, seizures, known hydrocephalus, and schizophrenia who presented from her nursing home for worsened of agitation, course c/b several BERTs being managed by Psych and primary team. NCHCT with imaging consistent with NPH, Neurology consulted for recommendations. The patient cannot describe her baseline gait or if she is having increased urinary frequency.    Exam: Yelling at me, tangential, unwilling to answer questions. EOMI, face symmetric, tongue midline. In 4 point restraints, seen moving all 4 extremities equally. Unable to assess rest of exam    Imp: This is a 73F admitted for worsening agitation who had CTH c/f NPH, unchanged from prior (Dec 2021).    - No need for inpatient workup of NPH, can be deferred to outpatient  - Despite subtherapeutic VPA level would not change medications and no evidence that the patient is having breakthrough seizures  - Rest as above.    Will sign off

## 2024-01-14 NOTE — PROGRESS NOTE ADULT - SUBJECTIVE AND OBJECTIVE BOX
PROGRESS NOTE:     Patient is a 73y old  Female who presents with a chief complaint of agitation, NH requesting psych evaluation (13 Jan 2024 16:46)      SUBJECTIVE / OVERNIGHT EVENTS: Remains agitated    ADDITIONAL REVIEW OF SYSTEMS:    MEDICATIONS  (STANDING):  albuterol/ipratropium for Nebulization 3 milliLiter(s) Nebulizer every 6 hours  ammonium lactate 12% Lotion 1 Application(s) Topical two times a day  carbidopa/levodopa  25/100 1 Tablet(s) Oral three times a day  clonazePAM  Tablet 0.5 milliGRAM(s) Oral two times a day  clotrimazole 1% Vaginal Cream 1 Applicatorful Vaginal two times a day  dextrose 5%. 1000 milliLiter(s) (50 mL/Hr) IV Continuous <Continuous>  dextrose 5%. 1000 milliLiter(s) (100 mL/Hr) IV Continuous <Continuous>  dextrose 50% Injectable 25 Gram(s) IV Push once  dextrose 50% Injectable 12.5 Gram(s) IV Push once  dextrose 50% Injectable 25 Gram(s) IV Push once  glucagon  Injectable 1 milliGRAM(s) IntraMuscular once  heparin   Injectable 5000 Unit(s) SubCutaneous every 12 hours  hydrocortisone 2.5% Ointment 1 Application(s) Topical two times a day  insulin lispro (ADMELOG) corrective regimen sliding scale   SubCutaneous three times a day before meals  levothyroxine 75 MICROGram(s) Oral daily  lisinopril 20 milliGRAM(s) Oral daily  metoprolol tartrate 25 milliGRAM(s) Oral two times a day  polyethylene glycol 3350 17 Gram(s) Oral every 12 hours  QUEtiapine 50 milliGRAM(s) Oral <User Schedule>  QUEtiapine 75 milliGRAM(s) Oral at bedtime  senna 2 Tablet(s) Oral at bedtime  valACYclovir 1000 milliGRAM(s) Oral every 12 hours  valproic acid 250 milliGRAM(s) Oral every 8 hours    MEDICATIONS  (PRN):  dextrose Oral Gel 15 Gram(s) Oral once PRN Blood Glucose LESS THAN 70 milliGRAM(s)/deciliter  OLANZapine Injectable 2.5 milliGRAM(s) IntraMuscular every 8 hours PRN agitation      CAPILLARY BLOOD GLUCOSE      POCT Blood Glucose.: 132 mg/dL (14 Jan 2024 09:20)  POCT Blood Glucose.: 96 mg/dL (14 Jan 2024 05:47)  POCT Blood Glucose.: 92 mg/dL (13 Jan 2024 23:55)  POCT Blood Glucose.: 94 mg/dL (13 Jan 2024 17:15)  POCT Blood Glucose.: 114 mg/dL (13 Jan 2024 12:53)  POCT Blood Glucose.: 62 mg/dL (13 Jan 2024 12:36)  POCT Blood Glucose.: 63 mg/dL (13 Jan 2024 12:22)    I&O's Summary      PHYSICAL EXAM:  Vital Signs Last 24 Hrs  T(C): 36.4 (14 Jan 2024 08:30), Max: 37.1 (13 Jan 2024 21:30)  T(F): 97.5 (14 Jan 2024 08:30), Max: 98.8 (13 Jan 2024 21:30)  HR: 60 (14 Jan 2024 08:30) (60 - 76)  BP: 146/83 (14 Jan 2024 08:30) (110/59 - 153/64)  BP(mean): --  RR: 18 (14 Jan 2024 08:30) (16 - 18)  SpO2: 98% (14 Jan 2024 08:30) (98% - 100%)    Parameters below as of 14 Jan 2024 08:30  Patient On (Oxygen Delivery Method): room air        CONSTITUTIONAL: agitated  RESPIRATORY: Normal respiratory effort; lungs are clear to auscultation bilaterally  CARDIOVASCULAR: Regular rate and rhythm, normal S1 and S2, no murmur/rub/gallop; No lower extremity edema; Peripheral pulses are 2+ bilaterally  ABDOMEN: Nontender to palpation, normoactive bowel sounds, no rebound/guarding; No hepatosplenomegaly  MUSCLOSKELETAL: no clubbing or cyanosis of digits; no joint swelling or tenderness to palpation  PSYCH: A+O to person, place, and time    LABS:                        11.4   5.42  )-----------( 208      ( 14 Jan 2024 05:52 )             36.2     01-14    142  |  106  |  14  ----------------------------<  99  4.8   |  25  |  0.98    Ca    9.8      14 Jan 2024 05:52  Phos  3.8     01-14  Mg     1.90     01-14            Urinalysis Basic - ( 14 Jan 2024 05:52 )    Color: x / Appearance: x / SG: x / pH: x  Gluc: 99 mg/dL / Ketone: x  / Bili: x / Urobili: x   Blood: x / Protein: x / Nitrite: x   Leuk Esterase: x / RBC: x / WBC x   Sq Epi: x / Non Sq Epi: x / Bacteria: x          RADIOLOGY & ADDITIONAL TESTS:  Results Reviewed:   Imaging Personally Reviewed:  Electrocardiogram Personally Reviewed:    COORDINATION OF CARE:  Care Discussed with Consultants/Other Providers [Y/N]:  Prior or Outpatient Records Reviewed [Y/N]:

## 2024-01-14 NOTE — CONSULT NOTE ADULT - SUBJECTIVE AND OBJECTIVE BOX
Neurology - Consult Note    -  Spectra: [phone] ([facility]), [phone] ([facility])  -    Patient unable to adequately provider history. HIstory per chart review    HPI: Patient SIMEON BLOOM is a 73y (1950) woman with a PMHx significant for vascular dementia, Parkinson's, retinopathy with macular edema, asthma, seizure, hypothyroidism, HLD, GERD, MDD, MI, malignant neoplasm of bladder, anxiety, known hydrocephalus, OA, schizophrenia, HTN complicated by with retinopathy who presents from NH ([facility]) for evaluation of agitation Jan 11 2024. Course c/b several code BERTs. Being managed with Zyprexa, Seroquel Klonopin CTH ventriculomegaly out of proportion to sulci. Neuro consulted for NPH. Patient unable to describe gait, unable to test in restraints. Patient unclear about urinary history.    ROS  Feels fine  notes some back discomfort  says she has been told her pupils are different sizes before    Allergies:  penicillin (Hives; Rash)    PMHx/PSHx/Family Hx: As above, otherwise see below   Parkinson disease  Seizure disorder  Hypothyroidism  Type II diabetes mellitus  Hyperlipidemia  Schizophrenia  Hemorrhoids  Hypertension    Social Hx:    Medications:  MEDICATIONS  (STANDING):  albuterol/ipratropium for Nebulization 3 milliLiter(s) Nebulizer every 6 hours  ammonium lactate 12% Lotion 1 Application(s) Topical two times a day  carbidopa/levodopa  25/100 1 Tablet(s) Oral three times a day  clonazePAM  Tablet 0.5 milliGRAM(s) Oral two times a day  clotrimazole 1% Vaginal Cream 1 Applicatorful Vaginal two times a day  dextrose 5%. 1000 milliLiter(s) (50 mL/Hr) IV Continuous <Continuous>  dextrose 5%. 1000 milliLiter(s) (100 mL/Hr) IV Continuous <Continuous>  dextrose 50% Injectable 25 Gram(s) IV Push once  dextrose 50% Injectable 12.5 Gram(s) IV Push once  dextrose 50% Injectable 25 Gram(s) IV Push once  glucagon  Injectable 1 milliGRAM(s) IntraMuscular once  heparin   Injectable 5000 Unit(s) SubCutaneous every 12 hours  hydrocortisone 2.5% Ointment 1 Application(s) Topical two times a day  insulin lispro (ADMELOG) corrective regimen sliding scale   SubCutaneous three times a day before meals  levothyroxine 75 MICROGram(s) Oral daily  lisinopril 20 milliGRAM(s) Oral daily  metoprolol tartrate 25 milliGRAM(s) Oral two times a day  OLANZapine Injectable 2.5 milliGRAM(s) IntraMuscular once  polyethylene glycol 3350 17 Gram(s) Oral every 12 hours  QUEtiapine 75 milliGRAM(s) Oral every 8 hours  senna 2 Tablet(s) Oral at bedtime  valACYclovir 1000 milliGRAM(s) Oral every 12 hours  valproic acid 250 milliGRAM(s) Oral every 8 hours    MEDICATIONS  (PRN):  dextrose Oral Gel 15 Gram(s) Oral once PRN Blood Glucose LESS THAN 70 milliGRAM(s)/deciliter  OLANZapine Injectable 5 milliGRAM(s) IntraMuscular every 6 hours PRN agitation      Vitals:  T(C): 36.4 (01-14-24 @ 18:00), Max: 37.1 (01-13-24 @ 21:30)  HR: 77 (01-14-24 @ 18:00) (60 - 80)  BP: 148/62 (01-14-24 @ 18:00) (110/59 - 159/80)  RR: 17 (01-14-24 @ 18:00) (16 - 19)  SpO2: 100% (01-14-24 @ 18:00) (98% - 100%)    Physical Examination:   General - NAD, laying in bed in 4 point restraints    Neurologic Exam:  Mental status - Awake, Alert, Oriented to person, place, and time. Speech slurred, fluent, repetition and naming intact. Follows simple and complex command. Occasionally rambles incoherently. Tells me to keep lights on because the water bottles are in the lights.     Cranial nerves - marked anisocoria OD 8mm OS 1mm VFF to threat, EOMI, she says face feels weird cannot elaborate, facial strength intact without asymmetry b/l, hearing intact b/l,  Motor - in 4 point restraints, was recently violent. NEY   DTR's - Refused  Coordination -MELY  Gait and station - MELY  Labs:                        11.4   5.42  )-----------( 208      ( 14 Jan 2024 05:52 )             36.2     01-14    142  |  106  |  14  ----------------------------<  99  4.8   |  25  |  0.98    Ca    9.8      14 Jan 2024 05:52  Phos  3.8     01-14  Mg     1.90     01-14      CAPILLARY BLOOD GLUCOSE      POCT Blood Glucose.: 109 mg/dL (14 Jan 2024 17:17)          CSF:                  Radiology:  CT Head No Cont:  (11 Jan 2024 09:16)  CTH 1/11/24  FINDINGS:    No acute intracranial hemorrhage. Areas of decreased attenuation in the deep and periventricular white matter, compatible with chronic small vessel disease. The ventricles are prominent compared to the sulci. The extra-axial spaces and basal cisterns are within normal limits. No midline shift or mass effect present.    The cranial cervical junction is within normal limits. The sella is not expanded. No depressed calvarial fracture. The visualized paranasal sinuses are well aerated. The visualized mastoid air cells are well aerated. The visualized orbits are within normal limits.    IMPRESSION:    1. No evidence of acute intracranial hemorrhage or midline shift.  2. Chronic small vessel disease.  3. Ventricles prominent when compared to the sulci. This could correlate with a normal pressure hydrocephalus. Recommend clinical correlation.   Neurology - Consult Note    -  Spectra: [phone] ([facility]), [phone] ([facility])  -    Patient unable to adequately provider history. HIstory per chart review    HPI: Patient SIMEON BLOOM is a 73y (1950) woman with a PMHx significant for vascular dementia, Parkinson's, retinopathy with macular edema, asthma, seizure, hypothyroidism, HLD, GERD, MDD, MI, malignant neoplasm of bladder, anxiety, known hydrocephalus, OA, schizophrenia, HTN complicated by with retinopathy who presents from NH ([facility]) for evaluation of agitation Jan 11 2024. Course c/b several code BERTs. Being managed with Zyprexa, Seroquel Klonopin CTH ventriculomegaly out of proportion to sulci. Neuro consulted for NPH. Patient unable to describe gait, unable to test in restraints. Patient unclear about urinary history.    ROS  Feels fine  notes some back discomfort  says she has been told her pupils are different sizes before    Allergies:  penicillin (Hives; Rash)    PMHx/PSHx/Family Hx: As above, otherwise see below   Parkinson disease  Seizure disorder  Hypothyroidism  Type II diabetes mellitus  Hyperlipidemia  Schizophrenia  Hemorrhoids  Hypertension    Social Hx:    Medications:  MEDICATIONS  (STANDING):  albuterol/ipratropium for Nebulization 3 milliLiter(s) Nebulizer every 6 hours  ammonium lactate 12% Lotion 1 Application(s) Topical two times a day  carbidopa/levodopa  25/100 1 Tablet(s) Oral three times a day  clonazePAM  Tablet 0.5 milliGRAM(s) Oral two times a day  clotrimazole 1% Vaginal Cream 1 Applicatorful Vaginal two times a day  dextrose 5%. 1000 milliLiter(s) (50 mL/Hr) IV Continuous <Continuous>  dextrose 5%. 1000 milliLiter(s) (100 mL/Hr) IV Continuous <Continuous>  dextrose 50% Injectable 25 Gram(s) IV Push once  dextrose 50% Injectable 12.5 Gram(s) IV Push once  dextrose 50% Injectable 25 Gram(s) IV Push once  glucagon  Injectable 1 milliGRAM(s) IntraMuscular once  heparin   Injectable 5000 Unit(s) SubCutaneous every 12 hours  hydrocortisone 2.5% Ointment 1 Application(s) Topical two times a day  insulin lispro (ADMELOG) corrective regimen sliding scale   SubCutaneous three times a day before meals  levothyroxine 75 MICROGram(s) Oral daily  lisinopril 20 milliGRAM(s) Oral daily  metoprolol tartrate 25 milliGRAM(s) Oral two times a day  OLANZapine Injectable 2.5 milliGRAM(s) IntraMuscular once  polyethylene glycol 3350 17 Gram(s) Oral every 12 hours  QUEtiapine 75 milliGRAM(s) Oral every 8 hours  senna 2 Tablet(s) Oral at bedtime  valACYclovir 1000 milliGRAM(s) Oral every 12 hours  valproic acid 250 milliGRAM(s) Oral every 8 hours    MEDICATIONS  (PRN):  dextrose Oral Gel 15 Gram(s) Oral once PRN Blood Glucose LESS THAN 70 milliGRAM(s)/deciliter  OLANZapine Injectable 5 milliGRAM(s) IntraMuscular every 6 hours PRN agitation      Vitals:  T(C): 36.4 (01-14-24 @ 18:00), Max: 37.1 (01-13-24 @ 21:30)  HR: 77 (01-14-24 @ 18:00) (60 - 80)  BP: 148/62 (01-14-24 @ 18:00) (110/59 - 159/80)  RR: 17 (01-14-24 @ 18:00) (16 - 19)  SpO2: 100% (01-14-24 @ 18:00) (98% - 100%)    Physical Examination:   General - NAD, laying in bed in 4 point restraints    Neurologic Exam:  Mental status - Awake, Alert, Oriented to person, place, and time. Speech slurred, fluent, repetition and naming intact. Follows simple and complex command. Occasionally rambles incoherently. Tells me to keep lights on because the water bottles are in the lights.     Cranial nerves - marked anisocoria OD 8mm OS 1mm VFF to threat, EOMI, she says face feels weird cannot elaborate, facial strength intact without asymmetry b/l, hearing intact b/l,  Motor - in 4 point restraints, was recently violent. NEY   DTR's - Refused  Coordination -MELY  Gait and station - MELY  Labs:                        11.4   5.42  )-----------( 208      ( 14 Jan 2024 05:52 )             36.2     01-14    142  |  106  |  14  ----------------------------<  99  4.8   |  25  |  0.98    Ca    9.8      14 Jan 2024 05:52  Phos  3.8     01-14  Mg     1.90     01-14      CAPILLARY BLOOD GLUCOSE      POCT Blood Glucose.: 109 mg/dL (14 Jan 2024 17:17)          CSF:                  Radiology:  CT Head No Cont:  (11 Jan 2024 09:16)  CTH 1/11/24  FINDINGS:    No acute intracranial hemorrhage. Areas of decreased attenuation in the deep and periventricular white matter, compatible with chronic small vessel disease. The ventricles are prominent compared to the sulci. The extra-axial spaces and basal cisterns are within normal limits. No midline shift or mass effect present.    The cranial cervical junction is within normal limits. The sella is not expanded. No depressed calvarial fracture. The visualized paranasal sinuses are well aerated. The visualized mastoid air cells are well aerated. The visualized orbits are within normal limits.    IMPRESSION:    1. No evidence of acute intracranial hemorrhage or midline shift.  2. Chronic small vessel disease.  3. Ventricles prominent when compared to the sulci. This could correlate with a normal pressure hydrocephalus. Recommend clinical correlation.

## 2024-01-14 NOTE — PROGRESS NOTE ADULT - ASSESSMENT
Ms. Azul is a 73 year old F with history of vascular dementia, Parkinson's, retinopathy with macular edema, asthma, seizure, hypothyroidism, HLD, GERD, MDD, MI, malignant neoplasm of bladder, anxiety, hydrocephalus, OA, schizophrenia, HTN complicated by with retinopathy who presents from NH ([facility]) for evaluation of agitation Ms. Azul is a 73 year old F with history of vascular dementia, Parkinson's, retinopathy with macular edema, asthma, seizure, hypothyroidism, HLD, GERD, MDD, MI, malignant neoplasm of bladder, anxiety, hydrocephalus, OA, schizophrenia, HTN complicated by with retinopathy who presents from NH ([facility]) for evaluation of agitation

## 2024-01-15 LAB
ANION GAP SERPL CALC-SCNC: 14 MMOL/L — SIGNIFICANT CHANGE UP (ref 7–14)
ANION GAP SERPL CALC-SCNC: 14 MMOL/L — SIGNIFICANT CHANGE UP (ref 7–14)
BUN SERPL-MCNC: 18 MG/DL — SIGNIFICANT CHANGE UP (ref 7–23)
BUN SERPL-MCNC: 18 MG/DL — SIGNIFICANT CHANGE UP (ref 7–23)
CALCIUM SERPL-MCNC: 9.5 MG/DL — SIGNIFICANT CHANGE UP (ref 8.4–10.5)
CALCIUM SERPL-MCNC: 9.5 MG/DL — SIGNIFICANT CHANGE UP (ref 8.4–10.5)
CHLORIDE SERPL-SCNC: 104 MMOL/L — SIGNIFICANT CHANGE UP (ref 98–107)
CHLORIDE SERPL-SCNC: 104 MMOL/L — SIGNIFICANT CHANGE UP (ref 98–107)
CO2 SERPL-SCNC: 22 MMOL/L — SIGNIFICANT CHANGE UP (ref 22–31)
CO2 SERPL-SCNC: 22 MMOL/L — SIGNIFICANT CHANGE UP (ref 22–31)
CREAT SERPL-MCNC: 0.85 MG/DL — SIGNIFICANT CHANGE UP (ref 0.5–1.3)
CREAT SERPL-MCNC: 0.85 MG/DL — SIGNIFICANT CHANGE UP (ref 0.5–1.3)
EGFR: 73 ML/MIN/1.73M2 — SIGNIFICANT CHANGE UP
EGFR: 73 ML/MIN/1.73M2 — SIGNIFICANT CHANGE UP
GLUCOSE SERPL-MCNC: 137 MG/DL — HIGH (ref 70–99)
GLUCOSE SERPL-MCNC: 137 MG/DL — HIGH (ref 70–99)
HCT VFR BLD CALC: 38.5 % — SIGNIFICANT CHANGE UP (ref 34.5–45)
HCT VFR BLD CALC: 38.5 % — SIGNIFICANT CHANGE UP (ref 34.5–45)
HGB BLD-MCNC: 12.6 G/DL — SIGNIFICANT CHANGE UP (ref 11.5–15.5)
HGB BLD-MCNC: 12.6 G/DL — SIGNIFICANT CHANGE UP (ref 11.5–15.5)
MAGNESIUM SERPL-MCNC: 2 MG/DL — SIGNIFICANT CHANGE UP (ref 1.6–2.6)
MAGNESIUM SERPL-MCNC: 2 MG/DL — SIGNIFICANT CHANGE UP (ref 1.6–2.6)
MCHC RBC-ENTMCNC: 26.9 PG — LOW (ref 27–34)
MCHC RBC-ENTMCNC: 26.9 PG — LOW (ref 27–34)
MCHC RBC-ENTMCNC: 32.7 GM/DL — SIGNIFICANT CHANGE UP (ref 32–36)
MCHC RBC-ENTMCNC: 32.7 GM/DL — SIGNIFICANT CHANGE UP (ref 32–36)
MCV RBC AUTO: 82.1 FL — SIGNIFICANT CHANGE UP (ref 80–100)
MCV RBC AUTO: 82.1 FL — SIGNIFICANT CHANGE UP (ref 80–100)
NRBC # BLD: 0 /100 WBCS — SIGNIFICANT CHANGE UP (ref 0–0)
NRBC # BLD: 0 /100 WBCS — SIGNIFICANT CHANGE UP (ref 0–0)
NRBC # FLD: 0 K/UL — SIGNIFICANT CHANGE UP (ref 0–0)
NRBC # FLD: 0 K/UL — SIGNIFICANT CHANGE UP (ref 0–0)
PHOSPHATE SERPL-MCNC: 3.8 MG/DL — SIGNIFICANT CHANGE UP (ref 2.5–4.5)
PHOSPHATE SERPL-MCNC: 3.8 MG/DL — SIGNIFICANT CHANGE UP (ref 2.5–4.5)
PLATELET # BLD AUTO: 233 K/UL — SIGNIFICANT CHANGE UP (ref 150–400)
PLATELET # BLD AUTO: 233 K/UL — SIGNIFICANT CHANGE UP (ref 150–400)
POTASSIUM SERPL-MCNC: 4.8 MMOL/L — SIGNIFICANT CHANGE UP (ref 3.5–5.3)
POTASSIUM SERPL-MCNC: 4.8 MMOL/L — SIGNIFICANT CHANGE UP (ref 3.5–5.3)
POTASSIUM SERPL-SCNC: 4.8 MMOL/L — SIGNIFICANT CHANGE UP (ref 3.5–5.3)
POTASSIUM SERPL-SCNC: 4.8 MMOL/L — SIGNIFICANT CHANGE UP (ref 3.5–5.3)
RBC # BLD: 4.69 M/UL — SIGNIFICANT CHANGE UP (ref 3.8–5.2)
RBC # BLD: 4.69 M/UL — SIGNIFICANT CHANGE UP (ref 3.8–5.2)
RBC # FLD: 14.5 % — SIGNIFICANT CHANGE UP (ref 10.3–14.5)
RBC # FLD: 14.5 % — SIGNIFICANT CHANGE UP (ref 10.3–14.5)
SODIUM SERPL-SCNC: 140 MMOL/L — SIGNIFICANT CHANGE UP (ref 135–145)
SODIUM SERPL-SCNC: 140 MMOL/L — SIGNIFICANT CHANGE UP (ref 135–145)
WBC # BLD: 6.64 K/UL — SIGNIFICANT CHANGE UP (ref 3.8–10.5)
WBC # BLD: 6.64 K/UL — SIGNIFICANT CHANGE UP (ref 3.8–10.5)
WBC # FLD AUTO: 6.64 K/UL — SIGNIFICANT CHANGE UP (ref 3.8–10.5)
WBC # FLD AUTO: 6.64 K/UL — SIGNIFICANT CHANGE UP (ref 3.8–10.5)

## 2024-01-15 PROCEDURE — 93010 ELECTROCARDIOGRAM REPORT: CPT

## 2024-01-15 PROCEDURE — 99232 SBSQ HOSP IP/OBS MODERATE 35: CPT

## 2024-01-15 RX ADMIN — CARBIDOPA AND LEVODOPA 1 TABLET(S): 25; 100 TABLET ORAL at 06:42

## 2024-01-15 RX ADMIN — Medication 1 APPLICATION(S): at 06:42

## 2024-01-15 RX ADMIN — VALACYCLOVIR 1000 MILLIGRAM(S): 500 TABLET, FILM COATED ORAL at 17:08

## 2024-01-15 RX ADMIN — Medication 3 MILLILITER(S): at 16:09

## 2024-01-15 RX ADMIN — CARBIDOPA AND LEVODOPA 1 TABLET(S): 25; 100 TABLET ORAL at 13:13

## 2024-01-15 RX ADMIN — OLANZAPINE 2.5 MILLIGRAM(S): 15 TABLET, FILM COATED ORAL at 07:14

## 2024-01-15 RX ADMIN — CARBIDOPA AND LEVODOPA 1 TABLET(S): 25; 100 TABLET ORAL at 22:58

## 2024-01-15 RX ADMIN — QUETIAPINE FUMARATE 75 MILLIGRAM(S): 200 TABLET, FILM COATED ORAL at 13:14

## 2024-01-15 RX ADMIN — Medication 250 MILLIGRAM(S): at 13:53

## 2024-01-15 RX ADMIN — QUETIAPINE FUMARATE 75 MILLIGRAM(S): 200 TABLET, FILM COATED ORAL at 06:41

## 2024-01-15 RX ADMIN — OLANZAPINE 5 MILLIGRAM(S): 15 TABLET, FILM COATED ORAL at 03:44

## 2024-01-15 RX ADMIN — Medication 1 APPLICATORFUL: at 06:43

## 2024-01-15 RX ADMIN — POLYETHYLENE GLYCOL 3350 17 GRAM(S): 17 POWDER, FOR SOLUTION ORAL at 06:43

## 2024-01-15 RX ADMIN — Medication 250 MILLIGRAM(S): at 22:58

## 2024-01-15 RX ADMIN — SENNA PLUS 2 TABLET(S): 8.6 TABLET ORAL at 22:58

## 2024-01-15 RX ADMIN — Medication 1 APPLICATION(S): at 13:37

## 2024-01-15 RX ADMIN — LISINOPRIL 20 MILLIGRAM(S): 2.5 TABLET ORAL at 06:40

## 2024-01-15 RX ADMIN — Medication 0.5 MILLIGRAM(S): at 22:58

## 2024-01-15 RX ADMIN — HEPARIN SODIUM 5000 UNIT(S): 5000 INJECTION INTRAVENOUS; SUBCUTANEOUS at 06:40

## 2024-01-15 RX ADMIN — Medication 0.5 MILLIGRAM(S): at 06:34

## 2024-01-15 RX ADMIN — QUETIAPINE FUMARATE 75 MILLIGRAM(S): 200 TABLET, FILM COATED ORAL at 22:58

## 2024-01-15 RX ADMIN — VALACYCLOVIR 1000 MILLIGRAM(S): 500 TABLET, FILM COATED ORAL at 06:42

## 2024-01-15 RX ADMIN — Medication 2: at 12:16

## 2024-01-15 RX ADMIN — Medication 25 MILLIGRAM(S): at 06:40

## 2024-01-15 RX ADMIN — Medication 75 MICROGRAM(S): at 06:40

## 2024-01-15 RX ADMIN — Medication 1 APPLICATION(S): at 06:43

## 2024-01-15 RX ADMIN — Medication 25 MILLIGRAM(S): at 16:16

## 2024-01-15 RX ADMIN — Medication 250 MILLIGRAM(S): at 06:40

## 2024-01-15 RX ADMIN — Medication 3 MILLILITER(S): at 10:45

## 2024-01-15 NOTE — PROVIDER CONTACT NOTE (OTHER) - BACKGROUND
Pt admitted for restlessness and agitation
pt admitted with restlessness and agitation
Pt restraint was removed and became combative while attempt was made to put restraints back.
pt admitted restless and agitation. the pt is also on 4 point restrain
Pt admitted for restlessness and agitation
Pt admitted with agitation and restlessness
Pt admitted for restlessness and agitation

## 2024-01-15 NOTE — PROVIDER CONTACT NOTE (OTHER) - REASON
Please reorder breakthrough zyprexa 1x/stat for CLIFFORD
Pt refusing full assessment and labs
pt refused 10:30am Vital sign checked
Pt is agitated and restless. Attempted to touch/hit other patients.
Pt is agitated and restless. Screaming in the room.
pt refused afternoon medication and 12:30 vital sign
Unable to get vital signs as pt is too agitated and restless. Pt unable to get BP and HR related meds.

## 2024-01-15 NOTE — PROGRESS NOTE ADULT - SUBJECTIVE AND OBJECTIVE BOX
PROGRESS NOTE:     Patient is a 73y old  Female who presents with a chief complaint of agitation, NH requesting psych evaluation (14 Jan 2024 21:19)      SUBJECTIVE / OVERNIGHT EVENTS: Remains agitated    ADDITIONAL REVIEW OF SYSTEMS:    MEDICATIONS  (STANDING):  albuterol/ipratropium for Nebulization 3 milliLiter(s) Nebulizer every 6 hours  ammonium lactate 12% Lotion 1 Application(s) Topical two times a day  carbidopa/levodopa  25/100 1 Tablet(s) Oral three times a day  clonazePAM  Tablet 0.5 milliGRAM(s) Oral two times a day  clotrimazole 1% Vaginal Cream 1 Applicatorful Vaginal two times a day  dextrose 5%. 1000 milliLiter(s) (50 mL/Hr) IV Continuous <Continuous>  dextrose 5%. 1000 milliLiter(s) (100 mL/Hr) IV Continuous <Continuous>  dextrose 50% Injectable 25 Gram(s) IV Push once  dextrose 50% Injectable 25 Gram(s) IV Push once  dextrose 50% Injectable 12.5 Gram(s) IV Push once  glucagon  Injectable 1 milliGRAM(s) IntraMuscular once  heparin   Injectable 5000 Unit(s) SubCutaneous every 12 hours  hydrocortisone 2.5% Ointment 1 Application(s) Topical two times a day  insulin lispro (ADMELOG) corrective regimen sliding scale   SubCutaneous three times a day before meals  levothyroxine 75 MICROGram(s) Oral daily  lisinopril 20 milliGRAM(s) Oral daily  metoprolol tartrate 25 milliGRAM(s) Oral two times a day  polyethylene glycol 3350 17 Gram(s) Oral every 12 hours  QUEtiapine 75 milliGRAM(s) Oral every 8 hours  senna 2 Tablet(s) Oral at bedtime  valACYclovir 1000 milliGRAM(s) Oral every 12 hours  valproic acid 250 milliGRAM(s) Oral every 8 hours    MEDICATIONS  (PRN):  dextrose Oral Gel 15 Gram(s) Oral once PRN Blood Glucose LESS THAN 70 milliGRAM(s)/deciliter  OLANZapine Injectable 5 milliGRAM(s) IntraMuscular every 6 hours PRN agitation      CAPILLARY BLOOD GLUCOSE      POCT Blood Glucose.: 115 mg/dL (15 Len 2024 06:20)  POCT Blood Glucose.: 189 mg/dL (15 Lne 2024 01:24)  POCT Blood Glucose.: 109 mg/dL (14 Jan 2024 17:17)  POCT Blood Glucose.: 98 mg/dL (14 Jan 2024 13:08)    I&O's Summary      PHYSICAL EXAM:  Vital Signs Last 24 Hrs  T(C): 36.4 (15 Len 2024 06:31), Max: 36.9 (14 Jan 2024 21:51)  T(F): 97.6 (15 Len 2024 06:31), Max: 98.5 (14 Jan 2024 21:51)  HR: 66 (15 Len 2024 06:31) (63 - 80)  BP: 145/77 (15 Len 2024 06:31) (127/67 - 159/80)  BP(mean): --  RR: 18 (15 Len 2024 06:31) (17 - 19)  SpO2: 100% (15 Len 2024 06:31) (97% - 100%)    Parameters below as of 15 Len 2024 06:31  Patient On (Oxygen Delivery Method): room air        CONSTITUTIONAL: Agitated  RESPIRATORY: Normal respiratory effort; lungs are clear to auscultation bilaterally  CARDIOVASCULAR: Regular rate and rhythm, normal S1 and S2, no murmur/rub/gallop; No lower extremity edema; Peripheral pulses are 2+ bilaterally  ABDOMEN: Nontender to palpation, normoactive bowel sounds, no rebound/guarding; No hepatosplenomegaly  MUSCLOSKELETAL: no clubbing or cyanosis of digits; no joint swelling or tenderness to palpation  PSYCH: agiated    LABS:                        12.6   6.64  )-----------( 233      ( 15 Len 2024 07:20 )             38.5     01-15    140  |  104  |  18  ----------------------------<  137<H>  4.8   |  22  |  0.85    Ca    9.5      15 Len 2024 07:20  Phos  3.8     01-15  Mg     2.00     01-15            Urinalysis Basic - ( 15 Len 2024 07:20 )    Color: x / Appearance: x / SG: x / pH: x  Gluc: 137 mg/dL / Ketone: x  / Bili: x / Urobili: x   Blood: x / Protein: x / Nitrite: x   Leuk Esterase: x / RBC: x / WBC x   Sq Epi: x / Non Sq Epi: x / Bacteria: x          RADIOLOGY & ADDITIONAL TESTS:  Results Reviewed:   Imaging Personally Reviewed:  Electrocardiogram Personally Reviewed:    COORDINATION OF CARE:  Care Discussed with Consultants/Other Providers [Y/N]:  Prior or Outpatient Records Reviewed [Y/N]:   PROGRESS NOTE:     Patient is a 73y old  Female who presents with a chief complaint of agitation, NH requesting psych evaluation (14 Jan 2024 21:19)      SUBJECTIVE / OVERNIGHT EVENTS: Remains agitated    ADDITIONAL REVIEW OF SYSTEMS:    MEDICATIONS  (STANDING):  albuterol/ipratropium for Nebulization 3 milliLiter(s) Nebulizer every 6 hours  ammonium lactate 12% Lotion 1 Application(s) Topical two times a day  carbidopa/levodopa  25/100 1 Tablet(s) Oral three times a day  clonazePAM  Tablet 0.5 milliGRAM(s) Oral two times a day  clotrimazole 1% Vaginal Cream 1 Applicatorful Vaginal two times a day  dextrose 5%. 1000 milliLiter(s) (50 mL/Hr) IV Continuous <Continuous>  dextrose 5%. 1000 milliLiter(s) (100 mL/Hr) IV Continuous <Continuous>  dextrose 50% Injectable 25 Gram(s) IV Push once  dextrose 50% Injectable 25 Gram(s) IV Push once  dextrose 50% Injectable 12.5 Gram(s) IV Push once  glucagon  Injectable 1 milliGRAM(s) IntraMuscular once  heparin   Injectable 5000 Unit(s) SubCutaneous every 12 hours  hydrocortisone 2.5% Ointment 1 Application(s) Topical two times a day  insulin lispro (ADMELOG) corrective regimen sliding scale   SubCutaneous three times a day before meals  levothyroxine 75 MICROGram(s) Oral daily  lisinopril 20 milliGRAM(s) Oral daily  metoprolol tartrate 25 milliGRAM(s) Oral two times a day  polyethylene glycol 3350 17 Gram(s) Oral every 12 hours  QUEtiapine 75 milliGRAM(s) Oral every 8 hours  senna 2 Tablet(s) Oral at bedtime  valACYclovir 1000 milliGRAM(s) Oral every 12 hours  valproic acid 250 milliGRAM(s) Oral every 8 hours    MEDICATIONS  (PRN):  dextrose Oral Gel 15 Gram(s) Oral once PRN Blood Glucose LESS THAN 70 milliGRAM(s)/deciliter  OLANZapine Injectable 5 milliGRAM(s) IntraMuscular every 6 hours PRN agitation      CAPILLARY BLOOD GLUCOSE      POCT Blood Glucose.: 115 mg/dL (15 Len 2024 06:20)  POCT Blood Glucose.: 189 mg/dL (15 Len 2024 01:24)  POCT Blood Glucose.: 109 mg/dL (14 Jan 2024 17:17)  POCT Blood Glucose.: 98 mg/dL (14 Jan 2024 13:08)    I&O's Summary      PHYSICAL EXAM:  Vital Signs Last 24 Hrs  T(C): 36.4 (15 Len 2024 06:31), Max: 36.9 (14 Jan 2024 21:51)  T(F): 97.6 (15 Len 2024 06:31), Max: 98.5 (14 Jan 2024 21:51)  HR: 66 (15 Len 2024 06:31) (63 - 80)  BP: 145/77 (15 Len 2024 06:31) (127/67 - 159/80)  BP(mean): --  RR: 18 (15 Len 2024 06:31) (17 - 19)  SpO2: 100% (15 Len 2024 06:31) (97% - 100%)    Parameters below as of 15 Len 2024 06:31  Patient On (Oxygen Delivery Method): room air        CONSTITUTIONAL: Agitated  RESPIRATORY: Normal respiratory effort; lungs are clear to auscultation bilaterally  CARDIOVASCULAR: Regular rate and rhythm, normal S1 and S2, no murmur/rub/gallop; No lower extremity edema; Peripheral pulses are 2+ bilaterally  ABDOMEN: Nontender to palpation, normoactive bowel sounds, no rebound/guarding; No hepatosplenomegaly  MUSCLOSKELETAL: no clubbing or cyanosis of digits; no joint swelling or tenderness to palpation  PSYCH: agiated    LABS:                        12.6   6.64  )-----------( 233      ( 15 Len 2024 07:20 )             38.5     01-15    140  |  104  |  18  ----------------------------<  137<H>  4.8   |  22  |  0.85    Ca    9.5      15 Len 2024 07:20  Phos  3.8     01-15  Mg     2.00     01-15            Urinalysis Basic - ( 15 Len 2024 07:20 )    Color: x / Appearance: x / SG: x / pH: x  Gluc: 137 mg/dL / Ketone: x  / Bili: x / Urobili: x   Blood: x / Protein: x / Nitrite: x   Leuk Esterase: x / RBC: x / WBC x   Sq Epi: x / Non Sq Epi: x / Bacteria: x          RADIOLOGY & ADDITIONAL TESTS:  Results Reviewed:   Imaging Personally Reviewed:  Electrocardiogram Personally Reviewed:    COORDINATION OF CARE:  Care Discussed with Consultants/Other Providers [Y/N]:  Prior or Outpatient Records Reviewed [Y/N]:

## 2024-01-15 NOTE — PROVIDER CONTACT NOTE (OTHER) - ACTION/TREATMENT ORDERED:
no new orders at time. plan of care continues
Provider made aware. Hold zyprexa and give the next dose of PRN when it is due if pt continues to be agitated.
Provider ordered IM zyprexa.
Provider was text paged and made aware. No further orders at this moment.
no new order at this time, plan of care continues
ACP made aware. As per ACP retry morning labs after pt receives breakfast. Endorsed to day nurse. No interventions for refusal of full assessment
Provider ordered IM zyprexa.

## 2024-01-15 NOTE — PROVIDER CONTACT NOTE (OTHER) - ASSESSMENT
Pt is restless and agitated.
Pt is restless and agitated. Unable to get accurate vitals.
Pt is restless and agitated.
pt is restless, agitated and spitting on staff members.
pt is A&OX2, no s/s of acute distress,
Pt is agitated and uncooperative. Refusing to take clothes off for full assessment, and refusing to have morning labs drawn
Pt is currently calm.
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
## 2024-01-15 NOTE — PROVIDER CONTACT NOTE (OTHER) - DATE AND TIME:
12-Jan-2024 07:15
13-Jan-2024 02:04
13-Jan-2024 04:44
13-Jan-2024 05:35
14-Jan-2024 10:35
14-Jan-2024 12:40
14-Jan-2024 20:00

## 2024-01-15 NOTE — CHART NOTE - NSCHARTNOTEFT_GEN_A_CORE
Patient transferred to 7S and is cooperating and not aggressive/agitated. Patient taken off restraints and 1:1. Low threshold to resume both, but since demonstrating cooperation will hold off on restraints.     Riddhi Castillo NP  [id number] Patient transferred to 7S and is cooperating and not aggressive/agitated. Patient taken off restraints and 1:1. Low threshold to resume both, but since demonstrating cooperation will hold off on restraints.     Riddhi Castillo NP  [id number] Patient transferred to 7S and is cooperating and not aggressive/agitated. Patient taken off restraints and 1:1. Low threshold to resume both, but since demonstrating cooperation will hold off on restraints.     Riddhi Castillo NP  [id number]

## 2024-01-15 NOTE — CHART NOTE - NSCHARTNOTESELECT_GEN_ALL_CORE
----- Message from Reji Santiago MD sent at 6/25/2020  2:32 PM CDT -----  Labs normal.  Please inform patient.    Component      Latest Ref Rng & Units 6/24/2020   Clue Cells      None Seen None Seen   Trichomonas      None Seen None Seen   YEAST      None Seen None Seen   Chlamydia Trachomatis by Nucleic Acid Amplification       Negative   Neisseria Gonorrhoeae by Nucleic Acid Amplification       Negative   Disclaimer       This result contains rich text formatting which cannot be displayed here.     
Event Note
Event Note
Left message for pt to call back  
PC with pt  Pt given below results    
Event Note

## 2024-01-15 NOTE — BH CONSULTATION LIAISON PROGRESS NOTE - ATTENDING COMMENTS
Chart reviewed. Discussed with resident. Agree with above assessment/recs. Will continue to follow
Chart reviewed. Discussed with resident. Agree with above assessment/recs. Will continue to follow.

## 2024-01-15 NOTE — PROGRESS NOTE ADULT - ASSESSMENT
Ms. Azul is a 73 year old F with history of vascular dementia, Parkinson's, retinopathy with macular edema, asthma, seizure, hypothyroidism, HLD, GERD, MDD, MI, malignant neoplasm of bladder, anxiety, hydrocephalus, OA, schizophrenia, HTN complicated by with retinopathy who presents from NH ([facility]) for evaluation of agitation Ms. Azul is a 73 year old F with history of vascular dementia, Parkinson's, retinopathy with macular edema, asthma, seizure, hypothyroidism, HLD, GERD, MDD, MI, malignant neoplasm of bladder, anxiety, hydrocephalus, OA, schizophrenia, HTN complicated by with retinopathy who presents from NH ([facility]) for evaluation of agitation Ms. Azul is a 73 year old F with history of vascular dementia, Parkinson's, retinopathy with macular edema, asthma, seizure, hypothyroidism, HLD, GERD, MDD, MI, malignant neoplasm of bladder, anxiety, hydrocephalus, OA, schizophrenia, HTN complicated by with retinopathy who presents from NH ([facility]) for evaluation of agitation
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
## 2024-01-16 PROCEDURE — 99232 SBSQ HOSP IP/OBS MODERATE 35: CPT

## 2024-01-16 RX ORDER — ALBUTEROL 90 UG/1
2 AEROSOL, METERED ORAL EVERY 6 HOURS
Refills: 0 | Status: DISCONTINUED | OUTPATIENT
Start: 2024-01-16 | End: 2024-01-17

## 2024-01-16 RX ORDER — QUETIAPINE FUMARATE 200 MG/1
75 TABLET, FILM COATED ORAL
Refills: 0 | Status: DISCONTINUED | OUTPATIENT
Start: 2024-01-16 | End: 2024-01-17

## 2024-01-16 RX ORDER — CHLORHEXIDINE GLUCONATE 213 G/1000ML
1 SOLUTION TOPICAL DAILY
Refills: 0 | Status: DISCONTINUED | OUTPATIENT
Start: 2024-01-16 | End: 2024-01-17

## 2024-01-16 RX ORDER — QUETIAPINE FUMARATE 200 MG/1
100 TABLET, FILM COATED ORAL AT BEDTIME
Refills: 0 | Status: DISCONTINUED | OUTPATIENT
Start: 2024-01-16 | End: 2024-01-17

## 2024-01-16 RX ADMIN — SENNA PLUS 2 TABLET(S): 8.6 TABLET ORAL at 22:33

## 2024-01-16 RX ADMIN — CARBIDOPA AND LEVODOPA 1 TABLET(S): 25; 100 TABLET ORAL at 13:57

## 2024-01-16 RX ADMIN — Medication 1 APPLICATORFUL: at 05:34

## 2024-01-16 RX ADMIN — Medication 250 MILLIGRAM(S): at 13:58

## 2024-01-16 RX ADMIN — Medication 1: at 11:47

## 2024-01-16 RX ADMIN — Medication 250 MILLIGRAM(S): at 22:33

## 2024-01-16 RX ADMIN — POLYETHYLENE GLYCOL 3350 17 GRAM(S): 17 POWDER, FOR SOLUTION ORAL at 05:33

## 2024-01-16 RX ADMIN — OLANZAPINE 5 MILLIGRAM(S): 15 TABLET, FILM COATED ORAL at 12:22

## 2024-01-16 RX ADMIN — QUETIAPINE FUMARATE 75 MILLIGRAM(S): 200 TABLET, FILM COATED ORAL at 05:32

## 2024-01-16 RX ADMIN — Medication 1: at 16:29

## 2024-01-16 RX ADMIN — POLYETHYLENE GLYCOL 3350 17 GRAM(S): 17 POWDER, FOR SOLUTION ORAL at 17:04

## 2024-01-16 RX ADMIN — Medication 1: at 07:32

## 2024-01-16 RX ADMIN — Medication 0.5 MILLIGRAM(S): at 22:10

## 2024-01-16 RX ADMIN — HEPARIN SODIUM 5000 UNIT(S): 5000 INJECTION INTRAVENOUS; SUBCUTANEOUS at 05:33

## 2024-01-16 RX ADMIN — LISINOPRIL 20 MILLIGRAM(S): 2.5 TABLET ORAL at 05:33

## 2024-01-16 RX ADMIN — QUETIAPINE FUMARATE 100 MILLIGRAM(S): 200 TABLET, FILM COATED ORAL at 22:32

## 2024-01-16 RX ADMIN — QUETIAPINE FUMARATE 75 MILLIGRAM(S): 200 TABLET, FILM COATED ORAL at 13:57

## 2024-01-16 RX ADMIN — Medication 25 MILLIGRAM(S): at 05:33

## 2024-01-16 RX ADMIN — VALACYCLOVIR 1000 MILLIGRAM(S): 500 TABLET, FILM COATED ORAL at 17:04

## 2024-01-16 RX ADMIN — Medication 250 MILLIGRAM(S): at 05:32

## 2024-01-16 RX ADMIN — Medication 1 APPLICATION(S): at 05:33

## 2024-01-16 RX ADMIN — Medication 1 APPLICATION(S): at 05:34

## 2024-01-16 RX ADMIN — CARBIDOPA AND LEVODOPA 1 TABLET(S): 25; 100 TABLET ORAL at 22:32

## 2024-01-16 RX ADMIN — Medication 75 MICROGRAM(S): at 05:33

## 2024-01-16 RX ADMIN — CARBIDOPA AND LEVODOPA 1 TABLET(S): 25; 100 TABLET ORAL at 05:32

## 2024-01-16 RX ADMIN — VALACYCLOVIR 1000 MILLIGRAM(S): 500 TABLET, FILM COATED ORAL at 05:32

## 2024-01-16 RX ADMIN — Medication 0.5 MILLIGRAM(S): at 05:33

## 2024-01-16 NOTE — BH CONSULTATION LIAISON PROGRESS NOTE - NSBHATTESTCOMMENTATTENDFT_PSY_A_CORE
ambulatory
Chart reviewed, events noted, case d/w 7S team, pt. seen/evaluated, minimally engaging, irritable, seems paranoid, uncooperative with interview and refusing to answer most of the questions,  denies si and hi. Plan as above, will follow

## 2024-01-16 NOTE — PROGRESS NOTE ADULT - ASSESSMENT
Ms. Azul is a 73 year old F with history of vascular dementia, Parkinson's, retinopathy with macular edema, asthma, seizure, hypothyroidism, HLD, GERD, MDD, MI, malignant neoplasm of bladder, anxiety, hydrocephalus, OA, schizophrenia, HTN complicated by with retinopathy who presents from NH ([facility]) for evaluation of agitation.  Ms. Azul is a 73 year old F with history of vascular dementia, Parkinson's, retinopathy with macular edema, asthma, seizure, hypothyroidism, HLD, GERD, MDD, MI, malignant neoplasm of bladder, anxiety, hydrocephalus, OA, schizophrenia, HTN complicated by with retinopathy who presents from NH ([facility]) for evaluation of agitation.
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
## 2024-01-16 NOTE — PROGRESS NOTE ADULT - TIME BILLING
Includes time spent on review of prior records, documentation, review of labs and vitals, and communication with RN staff, consultants, and CM/SW.

## 2024-01-16 NOTE — BH CONSULTATION LIAISON PROGRESS NOTE - NSBHATTESTBILLING_PSY_A_CORE
09612-Ayzorzwapm OBS or IP - moderate complexity OR 35-49 mins 50424-Leljhsgktt OBS or IP - moderate complexity OR 35-49 mins

## 2024-01-16 NOTE — PROGRESS NOTE ADULT - SUBJECTIVE AND OBJECTIVE BOX
PROGRESS NOTE:     Patient is a 73y old  Female who presents with a chief complaint of agitation, NH requesting psych evaluation (15 Len 2024 11:06)      SUBJECTIVE / OVERNIGHT EVENTS:  Pt is agitated. She is walking around the hallway yelling; stands close to provider and aggressively demands to know ethnic background. Walked away from pt.     ADDITIONAL REVIEW OF SYSTEMS:  Unable to obtain     MEDICATIONS  (STANDING):  albuterol/ipratropium for Nebulization 3 milliLiter(s) Nebulizer every 6 hours  ammonium lactate 12% Lotion 1 Application(s) Topical two times a day  carbidopa/levodopa  25/100 1 Tablet(s) Oral three times a day  chlorhexidine 4% Liquid 1 Application(s) Topical daily  clonazePAM  Tablet 0.5 milliGRAM(s) Oral two times a day  clotrimazole 1% Vaginal Cream 1 Applicatorful Vaginal two times a day  dextrose 5%. 1000 milliLiter(s) (100 mL/Hr) IV Continuous <Continuous>  dextrose 5%. 1000 milliLiter(s) (50 mL/Hr) IV Continuous <Continuous>  dextrose 50% Injectable 25 Gram(s) IV Push once  dextrose 50% Injectable 12.5 Gram(s) IV Push once  dextrose 50% Injectable 25 Gram(s) IV Push once  glucagon  Injectable 1 milliGRAM(s) IntraMuscular once  heparin   Injectable 5000 Unit(s) SubCutaneous every 12 hours  hydrocortisone 2.5% Ointment 1 Application(s) Topical two times a day  insulin lispro (ADMELOG) corrective regimen sliding scale   SubCutaneous three times a day before meals  levothyroxine 75 MICROGram(s) Oral daily  lisinopril 20 milliGRAM(s) Oral daily  metoprolol tartrate 25 milliGRAM(s) Oral two times a day  polyethylene glycol 3350 17 Gram(s) Oral every 12 hours  QUEtiapine 75 milliGRAM(s) Oral every 8 hours  senna 2 Tablet(s) Oral at bedtime  valACYclovir 1000 milliGRAM(s) Oral every 12 hours  valproic acid 250 milliGRAM(s) Oral every 8 hours    MEDICATIONS  (PRN):  dextrose Oral Gel 15 Gram(s) Oral once PRN Blood Glucose LESS THAN 70 milliGRAM(s)/deciliter  OLANZapine Injectable 5 milliGRAM(s) IntraMuscular every 6 hours PRN agitation      CAPILLARY BLOOD GLUCOSE      POCT Blood Glucose.: 173 mg/dL (16 Jan 2024 11:42)  POCT Blood Glucose.: 183 mg/dL (16 Jan 2024 07:28)  POCT Blood Glucose.: 150 mg/dL (15 Len 2024 16:26)    I&O's Summary      PHYSICAL EXAM:  Vital Signs Last 24 Hrs  T(C): 36.6 (16 Jan 2024 11:41), Max: 36.8 (15 Len 2024 22:39)  T(F): 97.8 (16 Jan 2024 11:41), Max: 98.2 (15 Len 2024 22:39)  HR: 82 (16 Jan 2024 11:41) (71 - 89)  BP: 110/74 (16 Jan 2024 11:41) (103/61 - 152/89)  BP(mean): --  RR: 18 (16 Jan 2024 11:41) (17 - 19)  SpO2: 98% (16 Jan 2024 11:41) (98% - 100%)    Parameters below as of 16 Jan 2024 11:41  Patient On (Oxygen Delivery Method): room air        CONSTITUTIONAL: NAD, well-developed  RESPIRATORY: Normal respiratory effort; lungs are clear to auscultation bilaterally  CARDIOVASCULAR: Regular rate and rhythm, normal S1 and S2, no murmur/rub/gallop; No lower extremity edema; Peripheral pulses are 2+ bilaterally  ABDOMEN: Nontender to palpation, normoactive bowel sounds, no rebound/guarding; No hepatosplenomegaly  MUSCULOSKELETAL: no clubbing or cyanosis of digits; no joint swelling or tenderness to palpation  PSYCH: Agitated    LABS:                        12.6   6.64  )-----------( 233      ( 15 Len 2024 07:20 )             38.5     01-15    140  |  104  |  18  ----------------------------<  137<H>  4.8   |  22  |  0.85    Ca    9.5      15 Len 2024 07:20  Phos  3.8     01-15  Mg     2.00     01-15            Urinalysis Basic - ( 15 Len 2024 07:20 )    Color: x / Appearance: x / SG: x / pH: x  Gluc: 137 mg/dL / Ketone: x  / Bili: x / Urobili: x   Blood: x / Protein: x / Nitrite: x   Leuk Esterase: x / RBC: x / WBC x   Sq Epi: x / Non Sq Epi: x / Bacteria: x          RADIOLOGY & ADDITIONAL TESTS:  Results Reviewed:   Imaging Personally Reviewed:  Electrocardiogram Personally Reviewed:    COORDINATION OF CARE:  Care Discussed with Consultants/Other Providers [Y/N]:  Prior or Outpatient Records Reviewed [Y/N]:

## 2024-01-17 ENCOUNTER — TRANSCRIPTION ENCOUNTER (OUTPATIENT)
Age: 74
End: 2024-01-17

## 2024-01-17 ENCOUNTER — INPATIENT (INPATIENT)
Facility: HOSPITAL | Age: 74
LOS: 120 days | Discharge: ANOTHER HOSPITAL | End: 2024-05-17
Attending: PSYCHIATRY & NEUROLOGY | Admitting: PSYCHIATRY & NEUROLOGY
Payer: MEDICARE

## 2024-01-17 VITALS
TEMPERATURE: 98 F | HEART RATE: 72 BPM | OXYGEN SATURATION: 100 % | RESPIRATION RATE: 18 BRPM | DIASTOLIC BLOOD PRESSURE: 56 MMHG | SYSTOLIC BLOOD PRESSURE: 88 MMHG

## 2024-01-17 VITALS
RESPIRATION RATE: 17 BRPM | OXYGEN SATURATION: 99 % | DIASTOLIC BLOOD PRESSURE: 70 MMHG | HEART RATE: 77 BPM | SYSTOLIC BLOOD PRESSURE: 110 MMHG | TEMPERATURE: 98 F

## 2024-01-17 DIAGNOSIS — F29 UNSPECIFIED PSYCHOSIS NOT DUE TO A SUBSTANCE OR KNOWN PHYSIOLOGICAL CONDITION: ICD-10-CM

## 2024-01-17 LAB
B PERT DNA SPEC QL NAA+PROBE: SIGNIFICANT CHANGE UP
B PERT+PARAPERT DNA PNL SPEC NAA+PROBE: SIGNIFICANT CHANGE UP
BORDETELLA PARAPERTUSSIS (RAPRVP): SIGNIFICANT CHANGE UP
C PNEUM DNA SPEC QL NAA+PROBE: SIGNIFICANT CHANGE UP
FLUAV SUBTYP SPEC NAA+PROBE: SIGNIFICANT CHANGE UP
FLUBV RNA SPEC QL NAA+PROBE: SIGNIFICANT CHANGE UP
GLUCOSE BLDC GLUCOMTR-MCNC: 165 MG/DL — HIGH (ref 70–99)
HADV DNA SPEC QL NAA+PROBE: SIGNIFICANT CHANGE UP
HCOV 229E RNA SPEC QL NAA+PROBE: SIGNIFICANT CHANGE UP
HCOV HKU1 RNA SPEC QL NAA+PROBE: SIGNIFICANT CHANGE UP
HCOV NL63 RNA SPEC QL NAA+PROBE: SIGNIFICANT CHANGE UP
HCOV OC43 RNA SPEC QL NAA+PROBE: SIGNIFICANT CHANGE UP
HMPV RNA SPEC QL NAA+PROBE: SIGNIFICANT CHANGE UP
HPIV1 RNA SPEC QL NAA+PROBE: SIGNIFICANT CHANGE UP
HPIV2 RNA SPEC QL NAA+PROBE: SIGNIFICANT CHANGE UP
HPIV3 RNA SPEC QL NAA+PROBE: SIGNIFICANT CHANGE UP
HPIV4 RNA SPEC QL NAA+PROBE: SIGNIFICANT CHANGE UP
M PNEUMO DNA SPEC QL NAA+PROBE: SIGNIFICANT CHANGE UP
MRSA PCR RESULT.: DETECTED
RAPID RVP RESULT: SIGNIFICANT CHANGE UP
RSV RNA SPEC QL NAA+PROBE: SIGNIFICANT CHANGE UP
RV+EV RNA SPEC QL NAA+PROBE: SIGNIFICANT CHANGE UP
S AUREUS DNA NOSE QL NAA+PROBE: DETECTED
SARS-COV-2 RNA SPEC QL NAA+PROBE: SIGNIFICANT CHANGE UP

## 2024-01-17 PROCEDURE — 99239 HOSP IP/OBS DSCHRG MGMT >30: CPT

## 2024-01-17 PROCEDURE — 99222 1ST HOSP IP/OBS MODERATE 55: CPT

## 2024-01-17 PROCEDURE — 99233 SBSQ HOSP IP/OBS HIGH 50: CPT

## 2024-01-17 RX ORDER — HYDROCORTISONE 1 %
1 OINTMENT (GRAM) TOPICAL
Qty: 0 | Refills: 0 | DISCHARGE
Start: 2024-01-17

## 2024-01-17 RX ORDER — METOPROLOL TARTRATE 50 MG
25 TABLET ORAL
Refills: 0 | Status: DISCONTINUED | OUTPATIENT
Start: 2024-01-17 | End: 2024-01-18

## 2024-01-17 RX ORDER — OLANZAPINE 15 MG/1
5 TABLET, FILM COATED ORAL EVERY 6 HOURS
Refills: 0 | Status: DISCONTINUED | OUTPATIENT
Start: 2024-01-17 | End: 2024-01-19

## 2024-01-17 RX ORDER — INSULIN LISPRO 100/ML
1 VIAL (ML) SUBCUTANEOUS
Qty: 0 | Refills: 0 | DISCHARGE
Start: 2024-01-17

## 2024-01-17 RX ORDER — SODIUM CHLORIDE 9 MG/ML
1000 INJECTION, SOLUTION INTRAVENOUS
Refills: 0 | Status: DISCONTINUED | OUTPATIENT
Start: 2024-01-17 | End: 2024-03-29

## 2024-01-17 RX ORDER — DEXTROSE 50 % IN WATER 50 %
12.5 SYRINGE (ML) INTRAVENOUS ONCE
Refills: 0 | Status: DISCONTINUED | OUTPATIENT
Start: 2024-01-17 | End: 2024-03-29

## 2024-01-17 RX ORDER — CLONAZEPAM 1 MG
0.5 TABLET ORAL
Refills: 0 | Status: DISCONTINUED | OUTPATIENT
Start: 2024-01-17 | End: 2024-01-19

## 2024-01-17 RX ORDER — OLANZAPINE 15 MG/1
5 TABLET, FILM COATED ORAL
Qty: 0 | Refills: 0 | DISCHARGE
Start: 2024-01-17

## 2024-01-17 RX ORDER — LISINOPRIL 2.5 MG/1
20 TABLET ORAL DAILY
Refills: 0 | Status: DISCONTINUED | OUTPATIENT
Start: 2024-01-17 | End: 2024-01-18

## 2024-01-17 RX ORDER — HYDROCORTISONE 1 %
1 OINTMENT (GRAM) TOPICAL
Refills: 0 | Status: COMPLETED | OUTPATIENT
Start: 2024-01-17 | End: 2024-01-22

## 2024-01-17 RX ORDER — MUPIROCIN 20 MG/G
1 OINTMENT TOPICAL
Refills: 0 | Status: DISCONTINUED | OUTPATIENT
Start: 2024-01-17 | End: 2024-01-18

## 2024-01-17 RX ORDER — INSULIN LISPRO 100/ML
VIAL (ML) SUBCUTANEOUS
Refills: 0 | Status: DISCONTINUED | OUTPATIENT
Start: 2024-01-17 | End: 2024-05-16

## 2024-01-17 RX ORDER — CARBIDOPA AND LEVODOPA 25; 100 MG/1; MG/1
1 TABLET ORAL THREE TIMES A DAY
Refills: 0 | Status: DISCONTINUED | OUTPATIENT
Start: 2024-01-17 | End: 2024-01-26

## 2024-01-17 RX ORDER — CLONAZEPAM 1 MG
1 TABLET ORAL
Qty: 0 | Refills: 0 | DISCHARGE
Start: 2024-01-17

## 2024-01-17 RX ORDER — POLYETHYLENE GLYCOL 3350 17 G/17G
17 POWDER, FOR SOLUTION ORAL EVERY 12 HOURS
Refills: 0 | Status: DISCONTINUED | OUTPATIENT
Start: 2024-01-17 | End: 2024-01-18

## 2024-01-17 RX ORDER — QUETIAPINE FUMARATE 200 MG/1
3 TABLET, FILM COATED ORAL
Qty: 0 | Refills: 0 | DISCHARGE
Start: 2024-01-17

## 2024-01-17 RX ORDER — OLANZAPINE 15 MG/1
5 TABLET, FILM COATED ORAL ONCE
Refills: 0 | Status: DISCONTINUED | OUTPATIENT
Start: 2024-01-17 | End: 2024-01-19

## 2024-01-17 RX ORDER — MUPIROCIN 20 MG/G
1 OINTMENT TOPICAL
Refills: 0 | Status: DISCONTINUED | OUTPATIENT
Start: 2024-01-17 | End: 2024-01-17

## 2024-01-17 RX ORDER — DEXTROSE 50 % IN WATER 50 %
15 SYRINGE (ML) INTRAVENOUS ONCE
Refills: 0 | Status: DISCONTINUED | OUTPATIENT
Start: 2024-01-17 | End: 2024-05-17

## 2024-01-17 RX ORDER — METOPROLOL TARTRATE 50 MG
1 TABLET ORAL
Qty: 0 | Refills: 0 | DISCHARGE
Start: 2024-01-17

## 2024-01-17 RX ORDER — LISINOPRIL 2.5 MG/1
1 TABLET ORAL
Qty: 0 | Refills: 0 | DISCHARGE
Start: 2024-01-17

## 2024-01-17 RX ORDER — DEXTROSE 50 % IN WATER 50 %
25 SYRINGE (ML) INTRAVENOUS ONCE
Refills: 0 | Status: DISCONTINUED | OUTPATIENT
Start: 2024-01-17 | End: 2024-03-29

## 2024-01-17 RX ORDER — SENNA PLUS 8.6 MG/1
2 TABLET ORAL AT BEDTIME
Refills: 0 | Status: DISCONTINUED | OUTPATIENT
Start: 2024-01-17 | End: 2024-02-11

## 2024-01-17 RX ORDER — QUETIAPINE FUMARATE 200 MG/1
1 TABLET, FILM COATED ORAL
Qty: 0 | Refills: 0 | DISCHARGE
Start: 2024-01-17

## 2024-01-17 RX ORDER — POLYETHYLENE GLYCOL 3350 17 G/17G
17 POWDER, FOR SOLUTION ORAL
Qty: 0 | Refills: 0 | DISCHARGE
Start: 2024-01-17

## 2024-01-17 RX ORDER — HALOPERIDOL DECANOATE 100 MG/ML
5 INJECTION INTRAMUSCULAR ONCE
Refills: 0 | Status: COMPLETED | OUTPATIENT
Start: 2024-01-17 | End: 2024-01-17

## 2024-01-17 RX ORDER — SENNA PLUS 8.6 MG/1
2 TABLET ORAL
Qty: 0 | Refills: 0 | DISCHARGE
Start: 2024-01-17

## 2024-01-17 RX ORDER — PHENYLEPHRINE-SHARK LIVER OIL-MINERAL OIL-PETROLATUM RECTAL OINTMENT
1 OINTMENT (GRAM) RECTAL DAILY
Refills: 0 | Status: DISCONTINUED | OUTPATIENT
Start: 2024-01-17 | End: 2024-05-17

## 2024-01-17 RX ORDER — QUETIAPINE FUMARATE 200 MG/1
75 TABLET, FILM COATED ORAL
Refills: 0 | Status: DISCONTINUED | OUTPATIENT
Start: 2024-01-18 | End: 2024-01-18

## 2024-01-17 RX ORDER — SOD,AMMONIUM,POTASSIUM LACTATE
1 CREAM (GRAM) TOPICAL
Qty: 0 | Refills: 0 | DISCHARGE
Start: 2024-01-17

## 2024-01-17 RX ORDER — ALBUTEROL 90 UG/1
2 AEROSOL, METERED ORAL
Qty: 0 | Refills: 0 | DISCHARGE
Start: 2024-01-17

## 2024-01-17 RX ORDER — ALBUTEROL 90 UG/1
2 AEROSOL, METERED ORAL EVERY 6 HOURS
Refills: 0 | Status: DISCONTINUED | OUTPATIENT
Start: 2024-01-17 | End: 2024-05-17

## 2024-01-17 RX ORDER — SOD,AMMONIUM,POTASSIUM LACTATE
1 CREAM (GRAM) TOPICAL
Refills: 0 | Status: DISCONTINUED | OUTPATIENT
Start: 2024-01-17 | End: 2024-02-02

## 2024-01-17 RX ORDER — VALACYCLOVIR 500 MG/1
1 TABLET, FILM COATED ORAL
Qty: 0 | Refills: 0 | DISCHARGE
Start: 2024-01-17

## 2024-01-17 RX ORDER — LEVOTHYROXINE SODIUM 125 MCG
1 TABLET ORAL
Qty: 0 | Refills: 0 | DISCHARGE
Start: 2024-01-17

## 2024-01-17 RX ORDER — CARBIDOPA AND LEVODOPA 25; 100 MG/1; MG/1
1.5 TABLET ORAL
Refills: 0 | DISCHARGE
Start: 2024-01-17

## 2024-01-17 RX ORDER — VALPROIC ACID (AS SODIUM SALT) 250 MG/5ML
250 SOLUTION, ORAL ORAL THREE TIMES A DAY
Refills: 0 | Status: DISCONTINUED | OUTPATIENT
Start: 2024-01-17 | End: 2024-01-18

## 2024-01-17 RX ORDER — LEVOTHYROXINE SODIUM 125 MCG
75 TABLET ORAL DAILY
Refills: 0 | Status: DISCONTINUED | OUTPATIENT
Start: 2024-01-17 | End: 2024-05-17

## 2024-01-17 RX ORDER — VALACYCLOVIR 500 MG/1
1000 TABLET, FILM COATED ORAL EVERY 12 HOURS
Refills: 0 | Status: DISCONTINUED | OUTPATIENT
Start: 2024-01-17 | End: 2024-03-20

## 2024-01-17 RX ORDER — GLUCAGON INJECTION, SOLUTION 0.5 MG/.1ML
1 INJECTION, SOLUTION SUBCUTANEOUS ONCE
Refills: 0 | Status: DISCONTINUED | OUTPATIENT
Start: 2024-01-17 | End: 2024-05-17

## 2024-01-17 RX ORDER — QUETIAPINE FUMARATE 200 MG/1
100 TABLET, FILM COATED ORAL AT BEDTIME
Refills: 0 | Status: DISCONTINUED | OUTPATIENT
Start: 2024-01-17 | End: 2024-01-18

## 2024-01-17 RX ORDER — QUETIAPINE FUMARATE 200 MG/1
75 TABLET, FILM COATED ORAL
Refills: 0 | Status: DISCONTINUED | OUTPATIENT
Start: 2024-01-17 | End: 2024-01-17

## 2024-01-17 RX ORDER — VALPROIC ACID (AS SODIUM SALT) 250 MG/5ML
325 SOLUTION, ORAL ORAL EVERY 8 HOURS
Refills: 0 | Status: DISCONTINUED | OUTPATIENT
Start: 2024-01-17 | End: 2024-01-17

## 2024-01-17 RX ORDER — VALPROIC ACID (AS SODIUM SALT) 250 MG/5ML
250 SOLUTION, ORAL ORAL EVERY 8 HOURS
Refills: 0 | Status: DISCONTINUED | OUTPATIENT
Start: 2024-01-17 | End: 2024-01-17

## 2024-01-17 RX ADMIN — Medication 1 APPLICATION(S): at 05:44

## 2024-01-17 RX ADMIN — QUETIAPINE FUMARATE 75 MILLIGRAM(S): 200 TABLET, FILM COATED ORAL at 17:54

## 2024-01-17 RX ADMIN — Medication 1: at 11:50

## 2024-01-17 RX ADMIN — Medication 25 MILLIGRAM(S): at 05:40

## 2024-01-17 RX ADMIN — OLANZAPINE 5 MILLIGRAM(S): 15 TABLET, FILM COATED ORAL at 18:44

## 2024-01-17 RX ADMIN — POLYETHYLENE GLYCOL 3350 17 GRAM(S): 17 POWDER, FOR SOLUTION ORAL at 05:42

## 2024-01-17 RX ADMIN — CARBIDOPA AND LEVODOPA 1 TABLET(S): 25; 100 TABLET ORAL at 13:11

## 2024-01-17 RX ADMIN — HEPARIN SODIUM 5000 UNIT(S): 5000 INJECTION INTRAVENOUS; SUBCUTANEOUS at 17:53

## 2024-01-17 RX ADMIN — Medication 1 APPLICATION(S): at 17:52

## 2024-01-17 RX ADMIN — Medication 75 MICROGRAM(S): at 05:41

## 2024-01-17 RX ADMIN — OLANZAPINE 5 MILLIGRAM(S): 15 TABLET, FILM COATED ORAL at 00:14

## 2024-01-17 RX ADMIN — OLANZAPINE 5 MILLIGRAM(S): 15 TABLET, FILM COATED ORAL at 12:18

## 2024-01-17 RX ADMIN — Medication 250 MILLIGRAM(S): at 06:17

## 2024-01-17 RX ADMIN — Medication 25 MILLIGRAM(S): at 17:55

## 2024-01-17 RX ADMIN — HEPARIN SODIUM 5000 UNIT(S): 5000 INJECTION INTRAVENOUS; SUBCUTANEOUS at 05:40

## 2024-01-17 RX ADMIN — Medication 0.5 MILLIGRAM(S): at 10:45

## 2024-01-17 RX ADMIN — Medication 250 MILLIGRAM(S): at 15:17

## 2024-01-17 RX ADMIN — MUPIROCIN 1 APPLICATION(S): 20 OINTMENT TOPICAL at 18:33

## 2024-01-17 RX ADMIN — QUETIAPINE FUMARATE 75 MILLIGRAM(S): 200 TABLET, FILM COATED ORAL at 05:43

## 2024-01-17 RX ADMIN — HALOPERIDOL DECANOATE 5 MILLIGRAM(S): 100 INJECTION INTRAMUSCULAR at 22:19

## 2024-01-17 RX ADMIN — Medication 1 APPLICATORFUL: at 17:51

## 2024-01-17 RX ADMIN — VALACYCLOVIR 1000 MILLIGRAM(S): 500 TABLET, FILM COATED ORAL at 05:39

## 2024-01-17 RX ADMIN — CARBIDOPA AND LEVODOPA 1 TABLET(S): 25; 100 TABLET ORAL at 05:40

## 2024-01-17 RX ADMIN — Medication 0.5 MILLIGRAM(S): at 17:54

## 2024-01-17 RX ADMIN — VALACYCLOVIR 1000 MILLIGRAM(S): 500 TABLET, FILM COATED ORAL at 18:00

## 2024-01-17 RX ADMIN — Medication 1 APPLICATION(S): at 05:41

## 2024-01-17 RX ADMIN — Medication 1 APPLICATORFUL: at 05:42

## 2024-01-17 RX ADMIN — LISINOPRIL 20 MILLIGRAM(S): 2.5 TABLET ORAL at 05:40

## 2024-01-17 RX ADMIN — CHLORHEXIDINE GLUCONATE 1 APPLICATION(S): 213 SOLUTION TOPICAL at 12:18

## 2024-01-17 RX ADMIN — Medication 2: at 16:30

## 2024-01-17 RX ADMIN — Medication 2 MILLIGRAM(S): at 22:19

## 2024-01-17 NOTE — DISCHARGE NOTE PROVIDER - CARE PROVIDER_API CALL
Elio Bneitez [facility]  Internal Medicine  [address] [place] [address]  Phone: [phone]  Fax: [phone]  Follow Up Time:

## 2024-01-17 NOTE — BH INPATIENT PSYCHIATRY ASSESSMENT NOTE - OTHER
limited  superficially cooperative  adequate  mildly increased tone in bilateral UE, no cogwheeling rigidity, no tremors observed in hands low end of fair  asymmetrical pupils  deferred

## 2024-01-17 NOTE — PROGRESS NOTE ADULT - PROBLEM SELECTOR PROBLEM 4
TAYLA (acute kidney injury)

## 2024-01-17 NOTE — PROGRESS NOTE ADULT - PROBLEM SELECTOR PLAN 6
-H/H 10.5/32.9, MCV 84  -iron panel and TSH in AM  -continue to monitor CBC daily
-H/H 10.5/32.9, MCV 84  -iron panel and TSH in AM  -continue to monitor CBC daily
Normal for race
-H/H 10.5/32.9, MCV 84  -iron panel and TSH in AM  -continue to monitor CBC daily

## 2024-01-17 NOTE — DISCHARGE NOTE NURSING/CASE MANAGEMENT/SOCIAL WORK - PATIENT PORTAL LINK FT
You can access the FollowMyHealth Patient Portal offered by [facility] by registering at the following website: [email or web address]. By joining [facility]s FollowMyHealth portal, you will also be able to view your health information using other applications (apps) compatible with our system.

## 2024-01-17 NOTE — BH CONSULTATION LIAISON PROGRESS NOTE - NSBHMSEBEHAV_PSY_A_CORE
initially uncooperative, then answered questions/Uncooperative/Hostile
initially uncooperative, then answered questions/Uncooperative/Hostile/Other

## 2024-01-17 NOTE — BH INPATIENT PSYCHIATRY ASSESSMENT NOTE - CURRENT MEDICATION
MEDICATIONS  (STANDING):    MEDICATIONS  (PRN):   MEDICATIONS  (STANDING):  ammonium lactate 12% Lotion 1 Application(s) Topical two times a day  carbidopa/levodopa  25/100 1 Tablet(s) Oral three times a day  clonazePAM  Tablet 0.5 milliGRAM(s) Oral two times a day  clotrimazole 1% Vaginal Cream 1 Applicatorful Vaginal two times a day  dextrose 5%. 1000 milliLiter(s) (100 mL/Hr) IV Continuous <Continuous>  dextrose 5%. 1000 milliLiter(s) (50 mL/Hr) IV Continuous <Continuous>  dextrose 50% Injectable 25 Gram(s) IV Push once  dextrose 50% Injectable 12.5 Gram(s) IV Push once  dextrose 50% Injectable 25 Gram(s) IV Push once  glucagon  Injectable 1 milliGRAM(s) IntraMuscular once  hemorrhoidal Ointment 1 Application(s) Rectal daily  insulin lispro (ADMELOG) corrective regimen sliding scale   SubCutaneous three times a day before meals  levothyroxine 75 MICROGram(s) Oral daily  lisinopril 20 milliGRAM(s) Oral daily  metoprolol tartrate 25 milliGRAM(s) Oral two times a day  multivitamin 1 Tablet(s) Oral daily  mupirocin 2% Nasal 1 Application(s) Alternating Nostrils two times a day  polyethylene glycol 3350 17 Gram(s) Oral every 12 hours  QUEtiapine 100 milliGRAM(s) Oral at bedtime  QUEtiapine 75 milliGRAM(s) Oral two times a day  senna 2 Tablet(s) Oral at bedtime  valACYclovir 1000 milliGRAM(s) Oral every 12 hours  valproic acid 250 milliGRAM(s) Oral three times a day    MEDICATIONS  (PRN):  albuterol    90 MICROgram(s) HFA Inhaler 2 Puff(s) Inhalation every 6 hours PRN Shortness of Breath and/or Wheezing  dextrose Oral Gel 15 Gram(s) Oral once PRN Blood Glucose LESS THAN 70 milliGRAM(s)/deciliter  hydrocortisone 2.5% Rectal Cream 1 Application(s) Rectal two times a day PRN hemorrhoids  OLANZapine 5 milliGRAM(s) Oral every 6 hours PRN moderate agitation  OLANZapine Injectable 5 milliGRAM(s) IntraMuscular once PRN severe agitation, / aggression   MEDICATIONS  (STANDING):  ammonium lactate 12% Lotion 1 Application(s) Topical two times a day  carbidopa/levodopa  25/100 1 Tablet(s) Oral three times a day  clonazePAM  Tablet 0.5 milliGRAM(s) Oral two times a day  dextrose 5%. 1000 milliLiter(s) (100 mL/Hr) IV Continuous <Continuous>  dextrose 5%. 1000 milliLiter(s) (50 mL/Hr) IV Continuous <Continuous>  dextrose 50% Injectable 25 Gram(s) IV Push once  dextrose 50% Injectable 12.5 Gram(s) IV Push once  dextrose 50% Injectable 25 Gram(s) IV Push once  glucagon  Injectable 1 milliGRAM(s) IntraMuscular once  hemorrhoidal Ointment 1 Application(s) Rectal daily  insulin lispro (ADMELOG) corrective regimen sliding scale   SubCutaneous three times a day before meals  levothyroxine 75 MICROGram(s) Oral daily  lisinopril 20 milliGRAM(s) Oral daily  metoprolol tartrate 25 milliGRAM(s) Oral two times a day  multivitamin 1 Tablet(s) Oral daily  mupirocin 2% Nasal 1 Application(s) Alternating Nostrils two times a day  polyethylene glycol 3350 17 Gram(s) Oral every 12 hours  QUEtiapine 100 milliGRAM(s) Oral at bedtime  QUEtiapine 75 milliGRAM(s) Oral two times a day  senna 2 Tablet(s) Oral at bedtime  valACYclovir 1000 milliGRAM(s) Oral every 12 hours  valproic acid 250 milliGRAM(s) Oral three times a day    MEDICATIONS  (PRN):  albuterol    90 MICROgram(s) HFA Inhaler 2 Puff(s) Inhalation every 6 hours PRN Shortness of Breath and/or Wheezing  dextrose Oral Gel 15 Gram(s) Oral once PRN Blood Glucose LESS THAN 70 milliGRAM(s)/deciliter  hydrocortisone 2.5% Rectal Cream 1 Application(s) Rectal two times a day PRN hemorrhoids  OLANZapine 5 milliGRAM(s) Oral every 6 hours PRN moderate agitation  OLANZapine Injectable 5 milliGRAM(s) IntraMuscular once PRN severe agitation, / aggression

## 2024-01-17 NOTE — DISCHARGE NOTE PROVIDER - HOSPITAL COURSE
73 year old F with history of vascular dementia, Parkinson's, retinopathy with macular edema, asthma, seizure, hypothyroidism, HLD, GERD, MDD, MI, malignant neoplasm of bladder, anxiety, hydrocephalus, OA, schizophrenia, HTN complicated by with retinopathy who presents from NH ([facility]) for evaluation of agitation (for 4 days per NH). Per NH, the patient was not taking her medications and there was initial concern for UTI, but u/a was not consistent with UTI; also, urine culture showed >3 organisms, consistent with contamination. C Per reports from the chart the patient was yelling racial slurs, threatening the staff and being combative. Per reports she became agitated after going to the bathroom, with loud incomprehensible speech. Pt remained agitated throughout admission and required 1:1 and restraints during stay. At time of discharge from [facility], pt has been off restraints for 48 hours, but still requires occasional PRNs. She will be discharged to [facility] for further psychiatric optimization    #Agitation:   - DDx includes psychotic decompensation vs dementia with behavioral disturbance. Less likely organic causes as u/a was not consistent with UTI  - Seroquel increased to 50 mg in AM, 50 mg in afternoon, and 75 mg HS for psychosis.   - Continue Klonopin 0.5 mg BID for agitation/anxiety (home med)  - PRNs: Zyprexa 2.5 mg PO/IM q6h as needed for severe agitation.   - CTH showed no hemorrhage, midline shift,  chronic small vessel dz, ventricles prominent  to sulci suggestive of normal pressure hydrocephalus.  - Neurology evaluated and recommends o/p evaluation for NPH.      #Seizure:   -depakote level subtherapeutic  -increased dose to 325 mg q8h  -repeat depakote level in 1 week    #Parkinsons:    -Seizure.   -continue valproic acid 250 mg Q8hr    #Hypothyroidism:   -c/w synthroid     73 year old F with history of vascular dementia, Parkinson's, retinopathy with macular edema, asthma, seizure, hypothyroidism, HLD, GERD, MDD, MI, malignant neoplasm of bladder, anxiety, hydrocephalus, OA, schizophrenia, HTN complicated by with retinopathy who presents from NH ([facility]) for evaluation of agitation (for 4 days per NH). Per NH, the patient was not taking her medications and there was initial concern for UTI, but u/a was not consistent with UTI; also, urine culture showed >3 organisms, consistent with contamination. C Per reports from the chart the patient was yelling racial slurs, threatening the staff and being combative. Per reports she became agitated after going to the bathroom, with loud incomprehensible speech. Pt remained agitated throughout admission and required 1:1 and restraints during stay. At time of discharge from [facility], pt has been off restraints for 48 hours, but still requires occasional PRNs. She will be discharged to [facility] for further psychiatric optimization    #Agitation:   - DDx includes psychotic decompensation vs dementia with behavioral disturbance. Less likely organic causes as u/a was not consistent with UTI  - Seroquel increased to 50 mg in AM, 50 mg in afternoon, and 75 mg HS for psychosis.   - Continue Klonopin 0.5 mg BID for agitation/anxiety (home med)  - PRNs: Zyprexa 2.5 mg PO/IM q6h as needed for severe agitation.   - CTH showed no hemorrhage, midline shift,  chronic small vessel dz, ventricles prominent  to sulci suggestive of normal pressure hydrocephalus.  - Neurology evaluated and recommends o/p evaluation for NPH.      #Seizure:   -250 mg q8h  -depakote level subtherapeutic; however, has remained stable without seizures. Unable to dose 325 mg, would have to increase dose to 500 mg, which would likely make pt supratherapeutic. Will continue current dose as pt has remained stable   -repeat depakote level in 1 week    #Parkinsons:    -Seizure.   -continue valproic acid 250 mg Q8hr    #Hypothyroidism:   -c/w synthroid

## 2024-01-17 NOTE — PROGRESS NOTE ADULT - SUBJECTIVE AND OBJECTIVE BOX
PROGRESS NOTE:   Authored by Dr. Bea Enriquez MD, pager [phone]     Patient is a 73y old  Female who presents with a chief complaint of agitation, NH requesting psych evaluation (17 Jan 2024 11:54)      SUBJECTIVE / OVERNIGHT EVENTS:  Pt is less irritable than yesterday. Redirectable. Complains of dry mouth.     ADDITIONAL REVIEW OF SYSTEMS:  Unable to obtain due to psychosis    MEDICATIONS  (STANDING):  ammonium lactate 12% Lotion 1 Application(s) Topical two times a day  carbidopa/levodopa  25/100 1 Tablet(s) Oral three times a day  chlorhexidine 4% Liquid 1 Application(s) Topical daily  clonazePAM  Tablet 0.5 milliGRAM(s) Oral two times a day  clotrimazole 1% Vaginal Cream 1 Applicatorful Vaginal two times a day  dextrose 5%. 1000 milliLiter(s) (100 mL/Hr) IV Continuous <Continuous>  dextrose 5%. 1000 milliLiter(s) (50 mL/Hr) IV Continuous <Continuous>  dextrose 50% Injectable 25 Gram(s) IV Push once  dextrose 50% Injectable 12.5 Gram(s) IV Push once  dextrose 50% Injectable 25 Gram(s) IV Push once  glucagon  Injectable 1 milliGRAM(s) IntraMuscular once  heparin   Injectable 5000 Unit(s) SubCutaneous every 12 hours  hydrocortisone 2.5% Ointment 1 Application(s) Topical two times a day  insulin lispro (ADMELOG) corrective regimen sliding scale   SubCutaneous three times a day before meals  levothyroxine 75 MICROGram(s) Oral daily  lisinopril 20 milliGRAM(s) Oral daily  metoprolol tartrate 25 milliGRAM(s) Oral two times a day  mupirocin 2% Ointment 1 Application(s) Topical two times a day  polyethylene glycol 3350 17 Gram(s) Oral every 12 hours  QUEtiapine 75 milliGRAM(s) Oral two times a day  QUEtiapine 100 milliGRAM(s) Oral at bedtime  senna 2 Tablet(s) Oral at bedtime  valACYclovir 1000 milliGRAM(s) Oral every 12 hours  valproic acid 250 milliGRAM(s) Oral every 8 hours    MEDICATIONS  (PRN):  albuterol    90 MICROgram(s) HFA Inhaler 2 Puff(s) Inhalation every 6 hours PRN Shortness of Breath and/or Wheezing  dextrose Oral Gel 15 Gram(s) Oral once PRN Blood Glucose LESS THAN 70 milliGRAM(s)/deciliter  OLANZapine Injectable 5 milliGRAM(s) IntraMuscular every 6 hours PRN agitation      CAPILLARY BLOOD GLUCOSE      POCT Blood Glucose.: 162 mg/dL (17 Jan 2024 11:27)  POCT Blood Glucose.: 118 mg/dL (17 Jan 2024 07:42)  POCT Blood Glucose.: 223 mg/dL (16 Jan 2024 21:55)  POCT Blood Glucose.: 172 mg/dL (16 Jan 2024 16:22)    I&O's Summary      PHYSICAL EXAM:  Vital Signs Last 24 Hrs  T(C): 36.4 (17 Jan 2024 11:41), Max: 36.8 (16 Jan 2024 19:59)  T(F): 97.5 (17 Jan 2024 11:41), Max: 98.2 (16 Jan 2024 19:59)  HR: 85 (17 Jan 2024 11:41) (81 - 98)  BP: 100/54 (17 Jan 2024 11:41) (100/54 - 115/71)  BP(mean): --  RR: 18 (17 Jan 2024 04:45) (17 - 18)  SpO2: 99% (17 Jan 2024 04:45) (98% - 99%)    Parameters below as of 17 Jan 2024 11:41  Patient On (Oxygen Delivery Method): room air        CONSTITUTIONAL: NAD, well-developed  RESPIRATORY: Normal respiratory effort; lungs are clear to auscultation bilaterally  CARDIOVASCULAR: Regular rate and rhythm, normal S1 and S2, no murmur/rub/gallop; No lower extremity edema; Peripheral pulses are 2+ bilaterally  ABDOMEN: Nontender to palpation, normoactive bowel sounds, no rebound/guarding; No hepatosplenomegaly  MUSCULOSKELETAL: no clubbing or cyanosis of digits; no joint swelling or tenderness to palpation  PSYCH: Irritable    LABS:                      RADIOLOGY & ADDITIONAL TESTS:  Results Reviewed:   Imaging Personally Reviewed:  Electrocardiogram Personally Reviewed:    COORDINATION OF CARE:  Care Discussed with Consultants/Other Providers [Y/N]:  Prior or Outpatient Records Reviewed [Y/N]:

## 2024-01-17 NOTE — PROGRESS NOTE ADULT - PROBLEM SELECTOR PLAN 1
-agitation in setting of dementia vs schizophrenia vs in the setting of NPH   -CTH showed no hemorrhage, midline shift,  chronic small vessel dz, ventricles prominent to sulci suggestive of normal pressure hydrocephalus. QTc on admission with QTc of 450, continue to monitor daily   -patient remains agitated; required PRN of Zyprexa on 1/16  -continue the following per psych:  - Seroquel increased to 50 mg in AM, 50 mg in afternoon, and 75 mg HS for psychosis.   - Continue Klonopin 0.5 mg BID for agitation/anxiety (home med)  - PRNs: Zyprexa 2.5 mg PO/IM q6h as needed for severe agitation. Can give additional 2.5mg after 30 minutes for refractory agitation    - Neurology evaluated and recommends o/p evaluation for NPH
-agitation in setting of dementia vs schizophrenia vs in the setting of NPH   -CTH showed no hemorrhage, midline shift,  chronic small vessel dz, ventricles prominent  to sulci suggestive of normal pressure hydrocephalus.   -QTc on admission with QTc of 450, continue to monitor daily   -psychiatry consulted, appreciated recommendations  -patient more agitated on 1/13, now on 1:1  -continue the following per psych:  - Seroquel increased to 50 mg in AM, 50 mg in afternoon, and 75 mg HS for psychosis.   --will check QTc < 500 ms.   - Continue Klonopin 0.5 mg BID for agitation/anxiety (home med)  - PRNs: Zyprexa 2.5 mg PO/IM q6h as needed for severe agitation. Can give additional 2.5mg after 30 minutes for refractory agitation  -- Will Avoid benzodiazepines if possible to avoid worsening dementia/delirium.
-agitation in setting of dementia vs schizophrenia vs in the setting of NPH   -CTH showed no hemorrhage, midline shift,  chronic small vessel dz, ventricles prominent  to sulci suggestive of normal pressure hydrocephalus.   -QTc on admission with QTc of 450, continue to monitor daily   -psychiatry consulted
-agitation in setting of dementia vs schizophrenia vs in the setting of NPH   -CTH showed no hemorrhage, midline shift,  chronic small vessel dz, ventricles prominent to sulci suggestive of normal pressure hydrocephalus. QTc on admission with QTc of 450, continue to monitor daily   -patient remains agitated; required PRN of Zyprexa on 1/16  -continue the following per psych:  - Seroquel increased to 50 mg in AM, 50 mg in afternoon, and 75 mg HS for psychosis.   - Continue Klonopin 0.5 mg BID for agitation/anxiety (home med)  - PRNs: Zyprexa 2.5 mg PO/IM q6h as needed for severe agitation. Can give additional 2.5mg after 30 minutes for refractory agitation    - Neurology evaluated and recommends o/p evaluation for NPH
-agitation in setting of dementia vs schizophrenia vs in the setting of NPH   -CTH showed no hemorrhage, midline shift,  chronic small vessel dz, ventricles prominent to sulci suggestive of normal pressure hydrocephalus. QTc on admission with QTc of 450, continue to monitor daily   -patient more agitated on 1/13, now on 1:1  -continue the following per psych:  - Seroquel increased to 50 mg in AM, 50 mg in afternoon, and 75 mg HS for psychosis.   - Continue Klonopin 0.5 mg BID for agitation/anxiety (home med)  - PRNs: Zyprexa 2.5 mg PO/IM q6h as needed for severe agitation. Can give additional 2.5mg after 30 minutes for refractory agitation  - remains agitated, will discuss plan with psychiatry  - Neurology evaluated and recommends o/p evaluation for NPH
-agitation in setting of dementia vs schizophrenia vs in the setting of NPH   -CTH showed no hemorrhage, midline shift,  chronic small vessel dz, ventricles prominent to sulci suggestive of normal pressure hydrocephalus.   -QTc on admission with QTc of 450, continue to monitor daily   -patient more agitated on 1/13, now on 1:1  -continue the following per psych:  - Seroquel increased to 50 mg in AM, 50 mg in afternoon, and 75 mg HS for psychosis.   --will check QTc < 500 ms.   - Continue Klonopin 0.5 mg BID for agitation/anxiety (home med)  - PRNs: Zyprexa 2.5 mg PO/IM q6h as needed for severe agitation. Can give additional 2.5mg after 30 minutes for refractory agitation  - will c/s neurology

## 2024-01-17 NOTE — PROGRESS NOTE ADULT - PROBLEM SELECTOR PLAN 7
-continue synthroid 75 mcg daily  -TSH 3.89

## 2024-01-17 NOTE — BH INPATIENT PSYCHIATRY ASSESSMENT NOTE - NSBHCHARTREVIEWVS_PSY_A_CORE FT
Vital Signs Last 24 Hrs  T(C): 36.5 (01-17-24 @ 17:50), Max: 36.7 (01-16-24 @ 22:00)  T(F): 97.7 (01-17-24 @ 17:50), Max: 98 (01-16-24 @ 22:00)  HR: 77 (01-17-24 @ 17:50) (77 - 90)  BP: 110/70 (01-17-24 @ 17:50) (100/54 - 115/71)  BP(mean): --  RR: 17 (01-17-24 @ 17:50) (17 - 18)  SpO2: 99% (01-17-24 @ 17:50) (99% - 99%)
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
## 2024-01-17 NOTE — PROGRESS NOTE ADULT - PROBLEM SELECTOR PLAN 8
-continue carbidopa-levadopa  mg TID

## 2024-01-17 NOTE — BH CONSULTATION LIAISON PROGRESS NOTE - GENERAL APPEARANCE
wearing mask/No deformities present

## 2024-01-17 NOTE — PROGRESS NOTE ADULT - REASON FOR ADMISSION
agitation, NH requesting psych evaluation
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
## 2024-01-17 NOTE — DISCHARGE NOTE PROVIDER - NSDCFUADDAPPT_GEN_ALL_CORE_FT
Continue medications as prescribed. Follow up with your primary care doctor, neurologist, and psychiatrist for further evaluation and management. Please call to make an appointment within 1-2 weeks of discharge.

## 2024-01-17 NOTE — DISCHARGE NOTE PROVIDER - NSDCCPCAREPLAN_GEN_ALL_CORE_FT
PRINCIPAL DISCHARGE DIAGNOSIS  Diagnosis: Agitation  Assessment and Plan of Treatment: You came in with agitation. This is suspected to be due to dementia vs decompensation from your schizophrenia. Your medications were adjusted and you will be discharged to [facility] for further management.      SECONDARY DISCHARGE DIAGNOSES  Diagnosis: Seizure  Assessment and Plan of Treatment: Your  depakote level was low. Your dosage was increased. You will need a repeat depakote level in one week.    Diagnosis: HTN (hypertension)  Assessment and Plan of Treatment: Continue taking your medications    Diagnosis: Hypothyroidism  Assessment and Plan of Treatment: Continue taking synthroid.     PRINCIPAL DISCHARGE DIAGNOSIS  Diagnosis: Agitation  Assessment and Plan of Treatment: You came in with agitation. This is suspected to be due to dementia vs decompensation from your schizophrenia. Your medications were adjusted and you will be discharged to [facility] for further management.      SECONDARY DISCHARGE DIAGNOSES  Diagnosis: Seizure  Assessment and Plan of Treatment: Continue taking depakote    Diagnosis: HTN (hypertension)  Assessment and Plan of Treatment: Continue taking your medications    Diagnosis: Hypothyroidism  Assessment and Plan of Treatment: Continue taking synthroid.

## 2024-01-17 NOTE — BH CONSULTATION LIAISON PROGRESS NOTE - NSBHFUPINTERVALCCFT_PSY_A_CORE
"I don't need meds!"
provocative  CLIFFORD for agitation overnight  restrained 
"I don't feel good, yes they are after me"
provocative  CLIFFORD for agitation overnight and CLIFFORD called this morning  restrained 
"Who is coming to my room"

## 2024-01-17 NOTE — BH CONSULTATION LIAISON PROGRESS NOTE - NSBHFUPREASONCONS_PSY_A_CORE
dementia/agitation
dementia/agitation
agitation/psychosis/med management
dementia/agitation
dementia/agitation

## 2024-01-17 NOTE — PROGRESS NOTE ADULT - PROBLEM SELECTOR PLAN 4
-now improved, Cr 1.46 on admission, today 1.05  -u/s without hydronephrosis
-improved  -u/s without hydronephrosis

## 2024-01-17 NOTE — BH INPATIENT PSYCHIATRY ASSESSMENT NOTE - NSBHMETABOLIC_PSY_ALL_CORE_FT
BMI: BMI (kg/m2): 30.2 (01-15-24 @ 12:52)  HbA1c: A1C with Estimated Average Glucose Result: 7.6 % (01-12-24 @ 12:40)    Glucose: POCT Blood Glucose.: 224 mg/dL (01-17-24 @ 16:13)    BP: --Vital Signs Last 24 Hrs  T(C): 36.5 (01-17-24 @ 17:50), Max: 36.7 (01-16-24 @ 22:00)  T(F): 97.7 (01-17-24 @ 17:50), Max: 98 (01-16-24 @ 22:00)  HR: 77 (01-17-24 @ 17:50) (77 - 90)  BP: 110/70 (01-17-24 @ 17:50) (100/54 - 115/71)  BP(mean): --  RR: 17 (01-17-24 @ 17:50) (17 - 18)  SpO2: 99% (01-17-24 @ 17:50) (99% - 99%)      Lipid Panel:

## 2024-01-17 NOTE — BH CONSULTATION LIAISON PROGRESS NOTE - NSBHMSEEYE_PSY_A_CORE
reports poor eyesight/Poor
reports poor eyesight/Unable to assess
reports poor eyesight/Poor

## 2024-01-17 NOTE — BH CONSULTATION LIAISON PROGRESS NOTE - CURRENT MEDICATION
MEDICATIONS  (STANDING):  albuterol/ipratropium for Nebulization 3 milliLiter(s) Nebulizer every 6 hours  ammonium lactate 12% Lotion 1 Application(s) Topical two times a day  carbidopa/levodopa  25/100 1 Tablet(s) Oral three times a day  clonazePAM  Tablet 0.5 milliGRAM(s) Oral two times a day  clotrimazole 1% Vaginal Cream 1 Applicatorful Vaginal two times a day  dextrose 5%. 1000 milliLiter(s) (50 mL/Hr) IV Continuous <Continuous>  dextrose 5%. 1000 milliLiter(s) (100 mL/Hr) IV Continuous <Continuous>  dextrose 50% Injectable 25 Gram(s) IV Push once  dextrose 50% Injectable 12.5 Gram(s) IV Push once  dextrose 50% Injectable 25 Gram(s) IV Push once  glucagon  Injectable 1 milliGRAM(s) IntraMuscular once  heparin   Injectable 5000 Unit(s) SubCutaneous every 12 hours  hydrocortisone 2.5% Ointment 1 Application(s) Topical two times a day  insulin lispro (ADMELOG) corrective regimen sliding scale   SubCutaneous three times a day before meals  levothyroxine 75 MICROGram(s) Oral daily  lisinopril 20 milliGRAM(s) Oral daily  metoprolol tartrate 25 milliGRAM(s) Oral two times a day  polyethylene glycol 3350 17 Gram(s) Oral every 12 hours  QUEtiapine 50 milliGRAM(s) Oral <User Schedule>  QUEtiapine 75 milliGRAM(s) Oral at bedtime  senna 2 Tablet(s) Oral at bedtime  valACYclovir 1000 milliGRAM(s) Oral every 12 hours  valproic acid 250 milliGRAM(s) Oral every 8 hours    MEDICATIONS  (PRN):  dextrose Oral Gel 15 Gram(s) Oral once PRN Blood Glucose LESS THAN 70 milliGRAM(s)/deciliter  OLANZapine Injectable 2.5 milliGRAM(s) IntraMuscular every 8 hours PRN agitation  
MEDICATIONS  (STANDING):  ammonium lactate 12% Lotion 1 Application(s) Topical two times a day  carbidopa/levodopa  25/100 1 Tablet(s) Oral three times a day  chlorhexidine 4% Liquid 1 Application(s) Topical daily  clonazePAM  Tablet 0.5 milliGRAM(s) Oral two times a day  clotrimazole 1% Vaginal Cream 1 Applicatorful Vaginal two times a day  dextrose 5%. 1000 milliLiter(s) (50 mL/Hr) IV Continuous <Continuous>  dextrose 5%. 1000 milliLiter(s) (100 mL/Hr) IV Continuous <Continuous>  dextrose 50% Injectable 25 Gram(s) IV Push once  dextrose 50% Injectable 12.5 Gram(s) IV Push once  dextrose 50% Injectable 25 Gram(s) IV Push once  glucagon  Injectable 1 milliGRAM(s) IntraMuscular once  heparin   Injectable 5000 Unit(s) SubCutaneous every 12 hours  hydrocortisone 2.5% Ointment 1 Application(s) Topical two times a day  insulin lispro (ADMELOG) corrective regimen sliding scale   SubCutaneous three times a day before meals  levothyroxine 75 MICROGram(s) Oral daily  lisinopril 20 milliGRAM(s) Oral daily  metoprolol tartrate 25 milliGRAM(s) Oral two times a day  polyethylene glycol 3350 17 Gram(s) Oral every 12 hours  QUEtiapine 75 milliGRAM(s) Oral two times a day  QUEtiapine 100 milliGRAM(s) Oral at bedtime  senna 2 Tablet(s) Oral at bedtime  valACYclovir 1000 milliGRAM(s) Oral every 12 hours  valproic acid 325 milliGRAM(s) Oral every 8 hours    MEDICATIONS  (PRN):  albuterol    90 MICROgram(s) HFA Inhaler 2 Puff(s) Inhalation every 6 hours PRN Shortness of Breath and/or Wheezing  dextrose Oral Gel 15 Gram(s) Oral once PRN Blood Glucose LESS THAN 70 milliGRAM(s)/deciliter  OLANZapine Injectable 5 milliGRAM(s) IntraMuscular every 6 hours PRN agitation  
MEDICATIONS  (STANDING):  albuterol/ipratropium for Nebulization 3 milliLiter(s) Nebulizer every 6 hours  ammonium lactate 12% Lotion 1 Application(s) Topical two times a day  carbidopa/levodopa  25/100 1 Tablet(s) Oral three times a day  chlorhexidine 4% Liquid 1 Application(s) Topical daily  clonazePAM  Tablet 0.5 milliGRAM(s) Oral two times a day  clotrimazole 1% Vaginal Cream 1 Applicatorful Vaginal two times a day  dextrose 5%. 1000 milliLiter(s) (50 mL/Hr) IV Continuous <Continuous>  dextrose 5%. 1000 milliLiter(s) (100 mL/Hr) IV Continuous <Continuous>  dextrose 50% Injectable 25 Gram(s) IV Push once  dextrose 50% Injectable 12.5 Gram(s) IV Push once  dextrose 50% Injectable 25 Gram(s) IV Push once  glucagon  Injectable 1 milliGRAM(s) IntraMuscular once  heparin   Injectable 5000 Unit(s) SubCutaneous every 12 hours  hydrocortisone 2.5% Ointment 1 Application(s) Topical two times a day  insulin lispro (ADMELOG) corrective regimen sliding scale   SubCutaneous three times a day before meals  levothyroxine 75 MICROGram(s) Oral daily  lisinopril 20 milliGRAM(s) Oral daily  metoprolol tartrate 25 milliGRAM(s) Oral two times a day  polyethylene glycol 3350 17 Gram(s) Oral every 12 hours  QUEtiapine 75 milliGRAM(s) Oral every 8 hours  senna 2 Tablet(s) Oral at bedtime  valACYclovir 1000 milliGRAM(s) Oral every 12 hours  valproic acid 250 milliGRAM(s) Oral every 8 hours    MEDICATIONS  (PRN):  dextrose Oral Gel 15 Gram(s) Oral once PRN Blood Glucose LESS THAN 70 milliGRAM(s)/deciliter  OLANZapine Injectable 5 milliGRAM(s) IntraMuscular every 6 hours PRN agitation  
MEDICATIONS  (STANDING):  albuterol/ipratropium for Nebulization 3 milliLiter(s) Nebulizer every 6 hours  ammonium lactate 12% Lotion 1 Application(s) Topical two times a day  carbidopa/levodopa  25/100 1 Tablet(s) Oral three times a day  clonazePAM  Tablet 0.5 milliGRAM(s) Oral two times a day  clotrimazole 1% Vaginal Cream 1 Applicatorful Vaginal two times a day  dextrose 5%. 1000 milliLiter(s) (50 mL/Hr) IV Continuous <Continuous>  dextrose 5%. 1000 milliLiter(s) (100 mL/Hr) IV Continuous <Continuous>  dextrose 50% Injectable 25 Gram(s) IV Push once  dextrose 50% Injectable 12.5 Gram(s) IV Push once  dextrose 50% Injectable 25 Gram(s) IV Push once  glucagon  Injectable 1 milliGRAM(s) IntraMuscular once  heparin   Injectable 5000 Unit(s) SubCutaneous every 12 hours  hydrocortisone 2.5% Ointment 1 Application(s) Topical two times a day  insulin lispro (ADMELOG) corrective regimen sliding scale   SubCutaneous three times a day before meals  levothyroxine 75 MICROGram(s) Oral daily  lisinopril 20 milliGRAM(s) Oral daily  metoprolol tartrate 25 milliGRAM(s) Oral two times a day  polyethylene glycol 3350 17 Gram(s) Oral every 12 hours  QUEtiapine 75 milliGRAM(s) Oral every 8 hours  senna 2 Tablet(s) Oral at bedtime  valACYclovir 1000 milliGRAM(s) Oral every 12 hours  valproic acid 250 milliGRAM(s) Oral every 8 hours    MEDICATIONS  (PRN):  dextrose Oral Gel 15 Gram(s) Oral once PRN Blood Glucose LESS THAN 70 milliGRAM(s)/deciliter  OLANZapine Injectable 5 milliGRAM(s) IntraMuscular every 6 hours PRN agitation  
MEDICATIONS  (STANDING):  albuterol/ipratropium for Nebulization 3 milliLiter(s) Nebulizer every 6 hours  ammonium lactate 12% Lotion 1 Application(s) Topical two times a day  carbidopa/levodopa  25/100 1 Tablet(s) Oral three times a day  clonazePAM  Tablet 0.5 milliGRAM(s) Oral two times a day  clotrimazole 1% Vaginal Cream 1 Applicatorful Vaginal two times a day  dextrose 5%. 1000 milliLiter(s) (100 mL/Hr) IV Continuous <Continuous>  dextrose 5%. 1000 milliLiter(s) (50 mL/Hr) IV Continuous <Continuous>  dextrose 50% Injectable 25 Gram(s) IV Push once  dextrose 50% Injectable 12.5 Gram(s) IV Push once  dextrose 50% Injectable 25 Gram(s) IV Push once  glucagon  Injectable 1 milliGRAM(s) IntraMuscular once  heparin   Injectable 5000 Unit(s) SubCutaneous every 12 hours  hydrocortisone 2.5% Ointment 1 Application(s) Topical two times a day  insulin lispro (ADMELOG) corrective regimen sliding scale   SubCutaneous three times a day before meals  levothyroxine 75 MICROGram(s) Oral daily  lisinopril 20 milliGRAM(s) Oral daily  metoprolol tartrate 25 milliGRAM(s) Oral two times a day  polyethylene glycol 3350 17 Gram(s) Oral every 12 hours  QUEtiapine 50 milliGRAM(s) Oral <User Schedule>  QUEtiapine 75 milliGRAM(s) Oral at bedtime  senna 2 Tablet(s) Oral at bedtime  valACYclovir 1000 milliGRAM(s) Oral every 12 hours  valproic acid 250 milliGRAM(s) Oral every 8 hours    MEDICATIONS  (PRN):  dextrose Oral Gel 15 Gram(s) Oral once PRN Blood Glucose LESS THAN 70 milliGRAM(s)/deciliter  OLANZapine Injectable 2.5 milliGRAM(s) IntraMuscular every 8 hours PRN agitation

## 2024-01-17 NOTE — DISCHARGE NOTE NURSING/CASE MANAGEMENT/SOCIAL WORK - NSDCCRNAME_GEN_ALL_CORE_FT
[facility]-[address] [place] [address], [place] [address]

## 2024-01-17 NOTE — BH CONSULTATION LIAISON PROGRESS NOTE - NSBHCHARTREVIEWLAB_PSY_A_CORE FT
10.9   5.42  )-----------( 216      ( 12 Jan 2024 12:40 )             34.7   01-12    144  |  110<H>  |  15  ----------------------------<  99  4.9   |  25  |  1.05    Ca    9.0      12 Jan 2024 12:40  Phos  2.5     01-12  Mg     2.10     01-12    

## 2024-01-17 NOTE — PROGRESS NOTE ADULT - PROBLEM SELECTOR PLAN 3
-continue valproic acid 250 mg Q8hr  -subtherepeutic level, but stable without seizures. Will not increase as next available dose would be 500 q8h, which would likely lead to supratherapeutic levels  -seizure precautions
-continue valproic acid 250 mg Q8hr  -seizure precautions
(4) rarely moist

## 2024-01-17 NOTE — DISCHARGE NOTE PROVIDER - NSFOLLOWUPCLINICS_GEN_ALL_ED_FT
[facility]  Psychiatry  [address] [place] [address]  Phone: [phone]  Fax:     [facility]  Neurology  [address] [place] [address]  Phone: [phone]  Fax:

## 2024-01-17 NOTE — DISCHARGE NOTE NURSING/CASE MANAGEMENT/SOCIAL WORK - NSDCPEFALRISK_GEN_ALL_CORE
For information on Fall & Injury Prevention, visit: [email or web address] OR  [email or web address] OR  https://www.cdc.gov/steadi/patient.html

## 2024-01-17 NOTE — BH CONSULTATION LIAISON PROGRESS NOTE - NSBHMSESPABN_PSY_A_CORE
Loud volume/Increased productivity
Loud volume
Loud volume/Increased productivity/Impaired articulation

## 2024-01-17 NOTE — BH CONSULTATION LIAISON PROGRESS NOTE - NSBHATTESTTYPEVISIT_PSY_A_CORE
Attending Only
MICHELLE without on-site Attending supervision
Attending with Resident/Fellow/Student
Resident/Fellow with telephonic supervision
Resident/Fellow with telephonic supervision

## 2024-01-17 NOTE — BH INPATIENT PSYCHIATRY ASSESSMENT NOTE - NSSUICPROTFACT_PSY_ALL_CORE
Supportive social network of family or friends/Positive therapeutic relationships/Anabaptist beliefs

## 2024-01-17 NOTE — BH CONSULTATION LIAISON PROGRESS NOTE - NSBHMSETHTCONTENT_PSY_A_CORE
+paranoia/Preoccupations/Ruminations/Unable to assess
+paranoia/Unable to assess
+paranoia/Unable to assess
+paranoia/Delusions/Ideas of reference
+paranoia/Unable to assess
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
## 2024-01-17 NOTE — PROGRESS NOTE ADULT - PROBLEM SELECTOR PLAN 9
DVT prophylaxis: heparin 5000 units every 12 hours  Diet: cardiac puree diet   GI prophylaxis: none   Full code per NH records  PT eval
DVT prophylaxis: heparin 5000 units every 12 hours  Diet: cardiac puree diet   GI prophylaxis: none   Full code per NH records, will need to clarify with family in the   PT eval
DVT prophylaxis: heparin 5000 units every 12 hours  Diet: cardiac puree diet   GI prophylaxis: none   Full code per NH records  PT eval
DVT prophylaxis: heparin 5000 units every 12 hours  Diet: cardiac puree diet   GI prophylaxis: none   Full code per NH records  PT eval

## 2024-01-17 NOTE — BH INPATIENT PSYCHIATRY ASSESSMENT NOTE - NSBHASSESSSUMMFT_PSY_ALL_CORE
- would consider Parkinson's Dementia as a diagnosis in this case more then Vascular Dementia  - consider switching out Seroquel to Zyprexa as Pt's symptoms and behaviors showed response to it when used PRN  - unclear what etiology the charted "seizure" history is  - would consider Parkinson's Dementia as a diagnosis in this case more then Vascular Dementia  - consider switching out Seroquel to Zyprexa as Pt's symptoms and behaviors showed response to it when used PRN  - unclear what etiology the charted "seizure" history is   - UNCLEAR why patient is still on Valacyclovir 1000mg PO bid (was on it on admission as well) - medical notes do not mention this
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
## 2024-01-17 NOTE — PROGRESS NOTE ADULT - NSPROGADDITIONALINFOA_GEN_ALL_CORE
Pt is medically stable for transfer to inpatient psych. A total of 38 minutes were spent on discharge coordination.

## 2024-01-17 NOTE — DISCHARGE NOTE PROVIDER - NSDCMRMEDTOKEN_GEN_ALL_CORE_FT
ammonium lactate 12% topical cream: 1 application topically 2 times a day  carbidopa-levodopa 25 mg-100 mg oral tablet: 1 tab(s) orally 3 times a day  clotrimazole 1% topical cream: 1 application topically 2 times a day  hydrocortisone 1% topical cream: 1 application topically every 12 hours for active hemorrhoids  levothyroxine 75 mcg (0.075 mg) oral tablet: 1 tab(s) orally every 24 hours  lisinopril 10 mg oral tablet: 1 tab(s) orally once a day  metoprolol tartrate 25 mg oral tablet: 1 tab(s) orally 2 times a day  Multiple Vitamins oral tablet: 1 tab(s) orally once a day  OLANZapine 10 mg intramuscular injection: 2.5 milligram(s) intramuscular every 8 hours, As needed, agitation  polyethylene glycol 3350 oral powder for reconstitution: 17 gram(s) orally every 12 hours  senna oral tablet: 2 tab(s) orally once a day (at bedtime)  valACYclovir 1 g oral tablet: 1 tab(s) orally every 12 hours  valproic acid 250 mg oral capsule: 1 cap(s) orally every 8 hours    albuterol 90 mcg/inh inhalation aerosol: 2 puff(s) inhaled every 6 hours As needed Shortness of Breath and/or Wheezing  ammonium lactate 12% topical lotion: 1 Apply topically to affected area 2 times a day  carbidopa-levodopa 25 mg-100 mg oral tablet: 1 tab(s) orally 3 times a day  clonazePAM 0.5 mg oral tablet: 1 tab(s) orally 2 times a day  clotrimazole 1% vaginal cream with applicator: 1 applicatorful vaginal 2 times a day last dose to be given on 1/21/24  hydrocortisone 2.5% topical ointment: 1 Apply topically to affected area 2 times a day  insulin lispro 100 units/mL injectable solution: 1 unit(s) injectable 3 times a day (before meals) 1 Unit(s) if Glucose 151 - 200  2 Unit(s) if Glucose 201 - 250  3 Unit(s) if Glucose 251 - 300  4 Unit(s) if Glucose 301 - 350  5 Unit(s) if Glucose 351 - 400  6 Unit(s) if Glucose Greater Than 400    Give correctional scale insulin  REGARDLESS of PO status NOTIFY Provider for blood glucose LESS THAN 70 milliGRAM(s)/deciLiter or GREATER THAN 400 milliGRAM(s)/deciLiter  levothyroxine 75 mcg (0.075 mg) oral tablet: 1 tab(s) orally once a day  lisinopril 20 mg oral tablet: 1 tab(s) orally once a day  metoprolol tartrate 25 mg oral tablet: 1 tab(s) orally 2 times a day  Multiple Vitamins oral tablet: 1 tab(s) orally once a day  OLANZapine 10 mg intramuscular injection: 5 milligram(s) intramuscular every 6 hours As needed agitation  polyethylene glycol 3350 oral powder for reconstitution: 17 gram(s) orally every 12 hours  QUEtiapine 100 mg oral tablet: 1 tab(s) orally once a day (at bedtime)  QUEtiapine 25 mg oral tablet: 3 tab(s) orally 2 times a day  senna leaf extract oral tablet: 2 tab(s) orally once a day (at bedtime)  valACYclovir 1 g oral tablet: 1 tab(s) orally every 12 hours  valproic acid 250 mg oral capsule: 1 cap(s) orally every 8 hours

## 2024-01-17 NOTE — PROGRESS NOTE ADULT - PROBLEM SELECTOR PLAN 2
-continue valproic acid 250 mg Q8hr and olanzapine PRN (home med continued)   -psych consult as above
-continue valproic acid 250 mg Q8hr and olanzapine PRN (home med continued)   -psych following
-continue valproic acid 250 mg Q8hr and olanzapine PRN (home med continued)   -psych following
-continue valproic acid 250 mg Q8hr and olanzapine PRN (home med continued)   -psych consult as above

## 2024-01-17 NOTE — BH CONSULTATION LIAISON PROGRESS NOTE - NSBHCHARTREVIEWVS_PSY_A_CORE FT
Vital Signs Last 24 Hrs  T(C): 36.5 (15 Len 2024 12:52), Max: 36.9 (14 Jan 2024 21:51)  T(F): 97.7 (15 Len 2024 12:52), Max: 98.5 (14 Jan 2024 21:51)  HR: 70 (15 Len 2024 12:52) (63 - 80)  BP: 138/72 (15 Len 2024 12:52) (127/67 - 159/80)  BP(mean): --  RR: 18 (15 Len 2024 12:52) (17 - 19)  SpO2: 99% (15 Len 2024 12:52) (97% - 100%)    Parameters below as of 15 Len 2024 12:52  Patient On (Oxygen Delivery Method): room air    
Vital Signs Last 24 Hrs  T(C): 36.5 (17 Jan 2024 04:45), Max: 36.8 (16 Jan 2024 19:59)  T(F): 97.7 (17 Jan 2024 04:45), Max: 98.2 (16 Jan 2024 19:59)  HR: 90 (17 Jan 2024 04:45) (81 - 98)  BP: 111/83 (17 Jan 2024 04:45) (107/69 - 115/71)  BP(mean): --  RR: 18 (17 Jan 2024 04:45) (17 - 18)  SpO2: 99% (17 Jan 2024 04:45) (98% - 99%)    Parameters below as of 17 Jan 2024 04:45  Patient On (Oxygen Delivery Method): room air    
Vital Signs Last 24 Hrs  T(C): 36.4 (14 Jan 2024 08:30), Max: 37.1 (13 Jan 2024 21:30)  T(F): 97.5 (14 Jan 2024 08:30), Max: 98.8 (13 Jan 2024 21:30)  HR: 60 (14 Jan 2024 08:30) (60 - 76)  BP: 146/83 (14 Jan 2024 08:30) (110/59 - 153/64)  BP(mean): --  RR: 18 (14 Jan 2024 08:30) (16 - 18)  SpO2: 98% (14 Jan 2024 08:30) (98% - 100%)    Parameters below as of 14 Jan 2024 08:30  Patient On (Oxygen Delivery Method): room air    
Vital Signs Last 24 Hrs  T(C): 36.1 (13 Jan 2024 12:30), Max: 37.2 (12 Jan 2024 15:18)  T(F): 97 (13 Jan 2024 12:30), Max: 99 (12 Jan 2024 15:18)  HR: 73 (13 Jan 2024 12:30) (63 - 73)  BP: 131/73 (13 Jan 2024 12:30) (114/72 - 131/73)  BP(mean): --  RR: 18 (13 Jan 2024 12:30) (16 - 18)  SpO2: 98% (13 Jan 2024 12:30) (98% - 100%)    Parameters below as of 13 Jan 2024 12:30  Patient On (Oxygen Delivery Method): room air    
Vital Signs Last 24 Hrs  T(C): 36.6 (16 Jan 2024 11:41), Max: 36.8 (15 Len 2024 22:39)  T(F): 97.8 (16 Jan 2024 11:41), Max: 98.2 (15 Len 2024 22:39)  HR: 82 (16 Jan 2024 11:41) (71 - 89)  BP: 110/74 (16 Jan 2024 11:41) (103/61 - 152/89)  BP(mean): --  RR: 18 (16 Jan 2024 11:41) (17 - 19)  SpO2: 98% (16 Jan 2024 11:41) (98% - 100%)    Parameters below as of 16 Jan 2024 11:41  Patient On (Oxygen Delivery Method): room air

## 2024-01-17 NOTE — PROGRESS NOTE ADULT - ASSESSMENT
Ms. Azul is a 73 year old F with history of vascular dementia, Parkinson's, retinopathy with macular edema, asthma, seizure, hypothyroidism, HLD, GERD, MDD, MI, malignant neoplasm of bladder, anxiety, hydrocephalus, OA, schizophrenia, HTN complicated by with retinopathy who presents from NH ([facility]) for evaluation of agitation. D/c to [facility] today.

## 2024-01-18 LAB
GLUCOSE BLDC GLUCOMTR-MCNC: 158 MG/DL — HIGH (ref 70–99)
GLUCOSE BLDC GLUCOMTR-MCNC: 195 MG/DL — HIGH (ref 70–99)

## 2024-01-18 PROCEDURE — 99223 1ST HOSP IP/OBS HIGH 75: CPT

## 2024-01-18 PROCEDURE — 99232 SBSQ HOSP IP/OBS MODERATE 35: CPT

## 2024-01-18 RX ORDER — METOPROLOL TARTRATE 50 MG
25 TABLET ORAL
Refills: 0 | Status: DISCONTINUED | OUTPATIENT
Start: 2024-01-18 | End: 2024-01-26

## 2024-01-18 RX ORDER — OLANZAPINE 15 MG/1
5 TABLET, FILM COATED ORAL ONCE
Refills: 0 | Status: COMPLETED | OUTPATIENT
Start: 2024-01-18 | End: 2024-01-18

## 2024-01-18 RX ORDER — HALOPERIDOL DECANOATE 100 MG/ML
5 INJECTION INTRAMUSCULAR ONCE
Refills: 0 | Status: COMPLETED | OUTPATIENT
Start: 2024-01-18 | End: 2024-01-18

## 2024-01-18 RX ORDER — DIVALPROEX SODIUM 500 MG/1
500 TABLET, DELAYED RELEASE ORAL
Refills: 0 | Status: DISCONTINUED | OUTPATIENT
Start: 2024-01-18 | End: 2024-01-22

## 2024-01-18 RX ORDER — MUPIROCIN 20 MG/G
1 OINTMENT TOPICAL
Refills: 0 | Status: COMPLETED | OUTPATIENT
Start: 2024-01-18 | End: 2024-01-23

## 2024-01-18 RX ORDER — OLANZAPINE 15 MG/1
5 TABLET, FILM COATED ORAL
Refills: 0 | Status: DISCONTINUED | OUTPATIENT
Start: 2024-01-18 | End: 2024-01-19

## 2024-01-18 RX ORDER — POLYETHYLENE GLYCOL 3350 17 G/17G
17 POWDER, FOR SOLUTION ORAL DAILY
Refills: 0 | Status: DISCONTINUED | OUTPATIENT
Start: 2024-01-18 | End: 2024-02-11

## 2024-01-18 RX ORDER — LISINOPRIL 2.5 MG/1
20 TABLET ORAL DAILY
Refills: 0 | Status: DISCONTINUED | OUTPATIENT
Start: 2024-01-18 | End: 2024-05-17

## 2024-01-18 RX ADMIN — VALACYCLOVIR 1000 MILLIGRAM(S): 500 TABLET, FILM COATED ORAL at 11:14

## 2024-01-18 RX ADMIN — Medication 250 MILLIGRAM(S): at 11:14

## 2024-01-18 RX ADMIN — Medication 1 MILLIGRAM(S): at 09:13

## 2024-01-18 RX ADMIN — HALOPERIDOL DECANOATE 5 MILLIGRAM(S): 100 INJECTION INTRAMUSCULAR at 09:13

## 2024-01-18 RX ADMIN — OLANZAPINE 5 MILLIGRAM(S): 15 TABLET, FILM COATED ORAL at 16:23

## 2024-01-18 RX ADMIN — OLANZAPINE 5 MILLIGRAM(S): 15 TABLET, FILM COATED ORAL at 22:21

## 2024-01-18 RX ADMIN — Medication 0.5 MILLIGRAM(S): at 11:14

## 2024-01-18 RX ADMIN — CARBIDOPA AND LEVODOPA 1 TABLET(S): 25; 100 TABLET ORAL at 11:14

## 2024-01-18 RX ADMIN — Medication 25 MILLIGRAM(S): at 11:14

## 2024-01-18 RX ADMIN — CARBIDOPA AND LEVODOPA 1 TABLET(S): 25; 100 TABLET ORAL at 13:32

## 2024-01-18 RX ADMIN — MUPIROCIN 1 APPLICATION(S): 20 OINTMENT TOPICAL at 19:29

## 2024-01-18 RX ADMIN — Medication 75 MICROGRAM(S): at 06:57

## 2024-01-18 NOTE — CONSULT NOTE ADULT - SUBJECTIVE AND OBJECTIVE BOX
73 year old F with history of vascular dementia, Parkinson's, retinopathy with macular edema, asthma, seizure, hypothyroidism, HLD, GERD, MDD, MI, malignant neoplasm of bladder, anxiety, hydrocephalus, OA, schizophrenia, HTN complicated by with retinopathy who presented from NH ([facility]) to [facility] for evaluation of agitation (for 4 days per NH). Per NH, the patient was not taking her medications and there was initial concern for UTI, but u/a was not consistent with UTI; also, urine culture showed >3 organisms, consistent with contamination.  Per reports from the chart the patient was yelling racial slurs, threatening the staff and being combative. Per reports she became agitated after going to the bathroom, with loud incomprehensible speech. Pt remained agitated throughout admission and required 1:1 and restraints during stay. At time of discharge from [facility], pt has been off restraints for 48 hours, but still requires occasional PRNs. She is now transferred to [facility] for further psychiatric optimization - no events     Allergies: PCN    PMHx: dementia, PD, asthma, hypothyroid, HLD, MDD, bladder ca, NPH  Social: denies  FHx: NC as relates to this encounter       ROS:  No HA/DZ  No Vision changes   No CP, SOB  No N/V/D  No Edema  No Rash  NO weakness, numbness    MEDICATIONS  (STANDING):  ammonium lactate 12% Lotion 1 Application(s) Topical two times a day  carbidopa/levodopa  25/100 1 Tablet(s) Oral three times a day  clonazePAM  Tablet 0.5 milliGRAM(s) Oral two times a day  clotrimazole 2% Vaginal Cream 1 Applicatorful Vaginal at bedtime  dextrose 5%. 1000 milliLiter(s) (100 mL/Hr) IV Continuous <Continuous>  dextrose 5%. 1000 milliLiter(s) (50 mL/Hr) IV Continuous <Continuous>  dextrose 50% Injectable 25 Gram(s) IV Push once  dextrose 50% Injectable 12.5 Gram(s) IV Push once  dextrose 50% Injectable 25 Gram(s) IV Push once  glucagon  Injectable 1 milliGRAM(s) IntraMuscular once  hemorrhoidal Ointment 1 Application(s) Rectal daily  insulin lispro (ADMELOG) corrective regimen sliding scale   SubCutaneous three times a day before meals  levothyroxine 75 MICROGram(s) Oral daily  lisinopril 20 milliGRAM(s) Oral daily  metoprolol tartrate 25 milliGRAM(s) Oral two times a day  multivitamin 1 Tablet(s) Oral daily  mupirocin 2% Nasal 1 Application(s) Both Nostrils two times a day  polyethylene glycol 3350 17 Gram(s) Oral every 12 hours  QUEtiapine 100 milliGRAM(s) Oral at bedtime  QUEtiapine 75 milliGRAM(s) Oral <User Schedule>  senna 2 Tablet(s) Oral at bedtime  valACYclovir 1000 milliGRAM(s) Oral every 12 hours  valproic acid 250 milliGRAM(s) Oral three times a day    MEDICATIONS  (PRN):  albuterol    90 MICROgram(s) HFA Inhaler 2 Puff(s) Inhalation every 6 hours PRN Shortness of Breath and/or Wheezing  dextrose Oral Gel 15 Gram(s) Oral once PRN Blood Glucose LESS THAN 70 milliGRAM(s)/deciliter  hydrocortisone 2.5% Rectal Cream 1 Application(s) Rectal two times a day PRN hemorrhoids  OLANZapine 5 milliGRAM(s) Oral every 6 hours PRN moderate agitation  OLANZapine Injectable 5 milliGRAM(s) IntraMuscular once PRN severe agitation, / aggression      T(C): 36.7 (01-17-24 @ 20:39)  HR: 72 (01-17-24 @ 20:39)  BP: 88/56 (01-17-24 @ 20:39)  RR: 18 (01-17-24 @ 20:39)  SpO2: 100% (01-17-24 @ 20:39)  CAPILLARY BLOOD GLUCOSE      POCT Blood Glucose.: 158 mg/dL (18 Jan 2024 08:00)  POCT Blood Glucose.: 165 mg/dL (17 Jan 2024 23:36)  POCT Blood Glucose.: 224 mg/dL (17 Jan 2024 16:13)  POCT Blood Glucose.: 162 mg/dL (17 Jan 2024 11:27)    I&O's Summary      PHYSICAL EXAM:  GENERAL: NAD  NECK: Supple, No JVD  CHEST/LUNG: Clear to auscultation bilaterally  HEART: Regular rate and rhythm; No murmurs, rubs, or gallops, No Edema  ABDOMEN: Soft, Nontender, Nondistended; Bowel sounds present  EXTREMITIES:  2+ Peripheral Pulses, No clubbing, cyanosis      LABS:            Cr 0.85            RADIOLOGY & ADDITIONAL TESTS:    Imaging Personally Reviewed:    Consultant(s) Notes Reviewed:      Care Discussed with Consultants/Other Providers:

## 2024-01-18 NOTE — BH INPATIENT PSYCHIATRY PROGRESS NOTE - OTHER
adequate  mildly increased tone in bilateral UE, no cogwheeling rigidity, no tremors observed in hands asymmetrical pupils  limited  superficially cooperative  deferred

## 2024-01-18 NOTE — PSYCHIATRIC REHAB INITIAL EVALUATION - NSBHPRRECOMMEND_PSY_ALL_CORE
Writer met with the patient to orient her to the psych rehab services and to establish therapeutic goals. However, patient was unable to focus on writer's questions. Patient kept looking away from writer and closing her eyes. According to the chart patient was hospitalized due to increased symptoms of agitation, restlessness, aggressive behavior and confusion. According to the chart patient has also been observed speaking to herself.  According to the chart patient has a history of mental illness and multiple psychiatric hospitalizations. According to the chart patient currently has a diagnosis of dementia of the vascular type.     ***Patient was unable to complete a safety plan.

## 2024-01-18 NOTE — DIETITIAN INITIAL EVALUATION ADULT - ORAL NUTRITION SUPPLEMENTS
Glucerna Therapeutic Nutrition Shake 240mls 2x daily (440kcals, 20g protein), thicken to appropriate consistency

## 2024-01-18 NOTE — BH INPATIENT PSYCHIATRY PROGRESS NOTE - NSBHMETABOLIC_PSY_ALL_CORE_FT
BMI: BMI (kg/m2): 30.2 (01-15-24 @ 12:52)  HbA1c: A1C with Estimated Average Glucose Result: 7.6 % (01-12-24 @ 12:40)    Glucose: POCT Blood Glucose.: 158 mg/dL (01-18-24 @ 08:00)    BP: 117/53 (01-18-24 @ 10:58) (88/56 - 117/53)Vital Signs Last 24 Hrs  T(C): 36.7 (01-17-24 @ 20:39), Max: 36.7 (01-17-24 @ 20:39)  T(F): 98 (01-17-24 @ 20:39), Max: 98 (01-17-24 @ 20:39)  HR: 68 (01-18-24 @ 10:58) (68 - 77)  BP: 117/53 (01-18-24 @ 10:58) (88/56 - 117/53)  BP(mean): --  RR: 18 (01-17-24 @ 20:39) (17 - 18)  SpO2: 100% (01-17-24 @ 20:39) (99% - 100%)      Lipid Panel:

## 2024-01-18 NOTE — BH PATIENT PROFILE - FALL HARM RISK - RISK INTERVENTIONS
Assistance OOB with selected safe patient handling equipment/Assistance with ambulation/Communicate Fall Risk and Risk Factors to all staff, patient, and family/Discuss with provider need for PT consult/Monitor for mental status changes/Monitor gait and stability/Move patient closer to nurses' station/Reinforce activity limits and safety measures with patient and family/Reorient to person, place and time as needed/Toileting schedule using arm’s reach rule for commode and bathroom/Use of alarms - bed, chair and/or voice tab/Visual Cue: Yellow wristband/Bed in lowest position, wheels locked, appropriate side rails in place/Call bell, personal items and telephone in reach/Instruct patient to call for assistance before getting out of bed or chair/Non-slip footwear when patient is out of bed/[place] to call system/Physically safe environment - no spills, clutter or unnecessary equipment/Purposeful Proactive Rounding/Room/bathroom lighting operational, light cord in reach

## 2024-01-18 NOTE — BH INPATIENT PSYCHIATRY PROGRESS NOTE - CURRENT MEDICATION
MEDICATIONS  (STANDING):  ammonium lactate 12% Lotion 1 Application(s) Topical two times a day  carbidopa/levodopa  25/100 1 Tablet(s) Oral three times a day  clonazePAM  Tablet 0.5 milliGRAM(s) Oral two times a day  clotrimazole 2% Vaginal Cream 1 Applicatorful Vaginal at bedtime  dextrose 5%. 1000 milliLiter(s) (50 mL/Hr) IV Continuous <Continuous>  dextrose 5%. 1000 milliLiter(s) (100 mL/Hr) IV Continuous <Continuous>  dextrose 50% Injectable 25 Gram(s) IV Push once  dextrose 50% Injectable 25 Gram(s) IV Push once  dextrose 50% Injectable 12.5 Gram(s) IV Push once  divalproex Sprinkle 500 milliGRAM(s) Oral two times a day  glucagon  Injectable 1 milliGRAM(s) IntraMuscular once  hemorrhoidal Ointment 1 Application(s) Rectal daily  insulin lispro (ADMELOG) corrective regimen sliding scale   SubCutaneous three times a day before meals  levothyroxine 75 MICROGram(s) Oral daily  lisinopril 20 milliGRAM(s) Oral daily  metoprolol tartrate 25 milliGRAM(s) Oral two times a day  multivitamin 1 Tablet(s) Oral daily  mupirocin 2% Nasal 1 Application(s) Both Nostrils two times a day  OLANZapine Disintegrating Tablet 5 milliGRAM(s) Oral <User Schedule>  polyethylene glycol 3350 17 Gram(s) Oral daily  senna 2 Tablet(s) Oral at bedtime  valACYclovir 1000 milliGRAM(s) Oral every 12 hours    MEDICATIONS  (PRN):  albuterol    90 MICROgram(s) HFA Inhaler 2 Puff(s) Inhalation every 6 hours PRN Shortness of Breath and/or Wheezing  dextrose Oral Gel 15 Gram(s) Oral once PRN Blood Glucose LESS THAN 70 milliGRAM(s)/deciliter  hydrocortisone 2.5% Rectal Cream 1 Application(s) Rectal two times a day PRN hemorrhoids  OLANZapine 5 milliGRAM(s) Oral every 6 hours PRN moderate agitation  OLANZapine Injectable 5 milliGRAM(s) IntraMuscular once PRN severe agitation, / aggression

## 2024-01-18 NOTE — DIETITIAN INITIAL EVALUATION ADULT - ADD RECOMMEND
1) Defer diet consistency to MD's discretion. Please consult to SLP for swallow eval if patient no longer tolerated current diet consistency. 2) c/w daily MVI for micronutrient coverage per MD order. 3) Encourage po intake as tolerated and honor food preferences PRN. 4) Monitor PO intake/tolerance, weights, labs, BM's, and skin integrity.

## 2024-01-18 NOTE — BH PATIENT PROFILE - FALL HARM RISK - OOB REASON
From: Laurel Lemons  Sent: 11/8/2022 6:31 AM CST  To: [facility]  Subject: Well Child Exam    This message is being sent by Alice Georges on behalf of Laurel Lemons.    Spoke to my  and he is unable to bring her on Friday.  I have tried to be more than polite about this whole situation.   Someone from you staff, that no longer works there, cancelled [place] appointment that I made over a year ago for September. The only reason I found this out was because I was making an appointment for myself for a blood pressure check. I took that day off from my old job and I would have ended up coming to an appointment that was cancelled without knowing.   Then when talking to management for approximately an hour I was offered this Friday 11/11/22, two months after she was suppose to have the appointment and had to take it because that was the only thing available the soonest. I now have a new job and I can’t take PTO right now.     I’m not sure if Dr. Arteaga knows any of this. She does see the whole family, everyone has seen her except for Joaquín. I would appreciate a bit more accommodation in this matter given the initial inconvenience from the year standing appointment that was cancelled by your staff. I need to get her in by the end of the month seeing as how she is now way over due. Tuesdays 8am or Thursdays 8-9am are what I can do.     Insurance only covers once a year well visits and so now her well visits which she has been having year around her birthday before now are going to have to be pushed back till November because of this mess, which I am sure none of you would want to go through!!     Lisseth   Cognitively impaired

## 2024-01-18 NOTE — DIETITIAN INITIAL EVALUATION ADULT - NS FNS DIET ORDER
Diet, Pureed:   DASH/TLC {Sodium & Cholesterol Restricted} (DASH)  No Caffeine  Supplement Feeding Modality:  Oral  Glucerna Shake Cans or Servings Per Day:  1       Frequency:  Daily (01-17-24 @ 17:19)

## 2024-01-18 NOTE — BH CHART NOTE - NSEVENTNOTEFT_PSY_ALL_CORE
SAUL contacted regarding restraint order. Per staff, manual hold only used from 22:19-22:21 on 1/17/24 for safe administration of IM haldol 5 mg and IM lorazepam 2 mg. Manual hold not used for duration of order (30 minutes).

## 2024-01-18 NOTE — DIETITIAN INITIAL EVALUATION ADULT - PERTINENT LABORATORY DATA
Glucose: 137 mg/dL (01.15.24 @ 07:20)     CAPILLARY BLOOD GLUCOSE      POCT Blood Glucose.: 158 mg/dL (18 Jan 2024 08:00)  POCT Blood Glucose.: 165 mg/dL (17 Jan 2024 23:36)  POCT Blood Glucose.: 224 mg/dL (17 Jan 2024 16:13)    A1C with Estimated Average Glucose Result: 7.6 % (01-12-24 @ 12:40)

## 2024-01-18 NOTE — CONSULT NOTE ADULT - ASSESSMENT
Ms. Azul is a 73 year old F with history of vascular dementia, Parkinson's, retinopathy with macular edema, asthma, seizure, hypothyroidism, HLD, GERD, MDD, MI, malignant neoplasm of bladder, anxiety, hydrocephalus, OA, schizophrenia, HTN complicated by with retinopathy who presents from NH ([facility]) for evaluation of agitation.        Problem/Plan - 1:  ·  Problem: Agitation.   ·  Plan: -agitation in setting of dementia vs schizophrenia  - CTH showed no hemorrhage, midline shift,  chronic small vessel dz, ventricles prominent to sulci suggestive of normal pressure hydrocephalus.  - Neurology evaluated and recommends o/p evaluation for NPH. pls make follow up appointment w neuro clinic   - per primary team      Problem/Plan - 2:  ·  Problem: Schizoaffective disorder.   ·  Plan: -per primary team - monitor qtc on psychotropics      Problem/Plan - 3:  ·  Problem: Seizure.   ·  Plan: -continue valproic acid 250 mg Q8hr  subtherapeutic level, but stable without seizures. Will not increase as next available dose would be 500 q8h, which would likely lead to supratherapeutic levels  -seizure precautions.     Problem/Plan - 4:  ·  Problem: TAYLA (acute kidney injury).   ·  Plan: -improved  -u/s without hydronephrosis.     Problem/Plan - 5:  ·  Problem: HTN (hypertension).   ·  Plan: -continue metoprolol tartrate 25 mg BID and lisinopril 10 mg daily with BP and HR parameters  -continue to monitor BP daily.     Problem/Plan - 6:  ·  Problem: Normocytic anemia.   ·  Plan: -H/H 10.5/32.9, MCV 84  - likely chronic dz - monitor      Problem/Plan - 7:  ·  Problem: Hypothyroidism.   ·  Plan: -continue synthroid 75 mcg daily  -TSH 3.89.     Problem/Plan - 8:  ·  Problem: Parkinsons.   ·  Plan: -continue carbidopa-levadopa  mg TID.       Ms. Azul is a 73 year old F with history of vascular dementia, Parkinson's, retinopathy with macular edema, asthma, seizure, hypothyroidism, HLD, GERD, MDD, MI, malignant neoplasm of bladder, anxiety, hydrocephalus, OA, schizophrenia, HTN complicated by with retinopathy who presents from NH ([facility]) for evaluation of agitation.        Problem/Plan - 1:  ·  Problem: Agitation.   ·  Plan: -agitation in setting of dementia vs schizophrenia  - CTH showed no hemorrhage, midline shift,  chronic small vessel dz, ventricles prominent to sulci suggestive of normal pressure hydrocephalus.  - Neurology evaluated and recommends o/p evaluation for NPH. pls make follow up appointment w neuro clinic   - per primary team      Problem/Plan - 2:  ·  Problem: Schizoaffective disorder.   ·  Plan: -per primary team - monitor qtc on psychotropics      Problem/Plan - 3:  ·  Problem: Seizure.   ·  Plan: -continue valproic acid 250 mg Q8hr  subtherapeutic level, but stable without seizures. Will not increase as next available dose would be 500 q8h, which would likely lead to supratherapeutic levels  -seizure precautions.     Problem/Plan - 4:  ·  Problem: TAYLA (acute kidney injury).   ·  Plan: -improved  -u/s without hydronephrosis.     Problem/Plan - 5:  ·  Problem: HTN (hypertension).   ·  Plan: -continue metoprolol tartrate 25 mg BID and lisinopril 10 mg daily with BP and HR parameters  -continue to monitor BP daily.     Problem/Plan - 6:  ·  Problem: Normocytic anemia.   ·  Plan: -H/H 10.5/32.9, MCV 84  - likely chronic dz - monitor      Problem/Plan - 7:  ·  Problem: Hypothyroidism.   ·  Plan: -continue synthroid 75 mcg daily  -TSH 3.89.     Problem/Plan - 8:  ·  Problem: Parkinsons.   ·  Plan: -continue carbidopa-levadopa  mg TID.      DM2 - A1C 7.6 - FS controlled - cw NISS - essentially diet controlled - if FS start running higher (>180), then can start metformin   pt noted on valtrex - unclear reason - appears for chronic supersession - pls obtain collateral form family/pmd

## 2024-01-18 NOTE — BH INPATIENT PSYCHIATRY PROGRESS NOTE - NSBHASSESSSUMMFT_PSY_ALL_CORE
- would consider Parkinson's Dementia as a diagnosis in this case more then Vascular Dementia  - consider switching out Seroquel to Zyprexa as Pt's symptoms and behaviors showed response to it when used PRN  - unclear what etiology the charted "seizure" history is   - UNCLEAR why patient is still on Valacyclovir 1000mg PO bid (was on it on admission as well) - medical notes do not mention this   1/18 Patient agitated labile aggressive. Spoke with ISREAL Aguillon from facility. Patient was on Seroquel and haldol but haldol tapered then dced 10/23 due to EPS which may have eventually led to decompensation. She feels these epsiodes of illness are manic like c/w schizoaffective dx.  Patient compliant in facility most of time. Diet there was mild thickened fluid and soft food. Suspect patient has Parkinsonism not PD given tremor occurring on 10mg/d haldol. Seroquel not effective as monotherapy. Will change to olanzapine consider some reduction in Sinemet, will increase Depakote and change to Sprinkles and target blood level over 50 especially since she has sz disorder. Consider attempt to at least reduce klonopin

## 2024-01-18 NOTE — BH PATIENT PROFILE - FUNCTIONAL ASSESSMENT - BASIC MOBILITY 6.
Bands verified on parents and infant, see footprint sheet. Infant placed in carseat by parents. Education complete. 3 = A little assistance

## 2024-01-18 NOTE — DIETITIAN INITIAL EVALUATION ADULT - OTHER INFO
Patient is a 74 y/o female, domiciled at [facility] for past 8 years, with PPHx of Schizophrenia, Vascular dementia with behavioral disturbance; multiple inpatient psych admissions (x2 in 2023 per NH, [facility] in Nov 2021); followed by outpt psych NP, no known suicidality/SIB; h/o threatening violence towards staff; PMH of seizure disorder, Parkinson's disease, HTN, T2DM, asthma, hypothyroidism, retinopathy with macular edema, GERD, h/o malignant neoplasm of bladder. Patient was sent to [facility] ED on 1/11/24 for increasing agitation, yelling and verbally abusing staff at the nursing home. Admitted to [facility] for non-organic psychosis.     Patient unable to engage in the interview with writer today due to drowsiness s/p IM medication this AM, hx of dementia. Collateral information obtained from patient's medical chart and RN. Per [facility] RD's note 1/12/24 -- Per [facility] transfer documents, Pt was on Mechanical/Soft diet with Mildly Thick Liquids. Patient has been on a pureed diet consistency per MD since she admitted to [facility]. On pureed diet order per MD order this morning with minimal po intake per RN and staff on the unit, patient verbalized to staff that she dislikes pureed diet. No reports of chewing/swallowing difficulties on current diet at this time. No food allergies documented. No food preferences obtained today. c/w daily MVI for micronutrient coverage. +hx of DM, HgbA1C 7.6%, gluc 137, fingersticks not within target. Unable to provide diet education to patient during encounter today. On SSI for glycemic control per MD. Case d/w RN and MD on the unit.    No current ht/wt available at this time. Per chart, ht = 157.48cm, UBW/recent wt status PTA unknown at this time. Will monitor wt trend.

## 2024-01-18 NOTE — BH PATIENT PROFILE - HOME MEDICATIONS
mupirocin 2% nasal ointment , 1 application nasal 2 times a day bilateral nares, last dose to be given on 1/22/24  clotrimazole 1% vaginal cream with applicator , 1 applicatorful vaginal 2 times a day last dose to be given on 1/21/24  levothyroxine 75 mcg (0.075 mg) oral tablet , 1 tab(s) orally once a day  polyethylene glycol 3350 oral powder for reconstitution , 17 gram(s) orally every 12 hours  senna leaf extract oral tablet , 2 tab(s) orally once a day (at bedtime)  ammonium lactate 12% topical lotion , 1 Apply topically to affected area 2 times a day  hydrocortisone 2.5% topical ointment , 1 Apply topically to affected area 2 times a day  albuterol 90 mcg/inh inhalation aerosol , 2 puff(s) inhaled every 6 hours As needed Shortness of Breath and/or Wheezing  metoprolol tartrate 25 mg oral tablet , 1 tab(s) orally 2 times a day  clonazePAM 0.5 mg oral tablet , 1 tab(s) orally 2 times a day  valACYclovir 1 g oral tablet , 1 tab(s) orally every 12 hours  QUEtiapine 25 mg oral tablet , 3 tab(s) orally 2 times a day  QUEtiapine 100 mg oral tablet , 1 tab(s) orally once a day (at bedtime)  OLANZapine 10 mg intramuscular injection , 5 milligram(s) intramuscular every 6 hours As needed agitation  carbidopa-levodopa 25 mg-100 mg oral tablet , 1 tab(s) orally 3 times a day  insulin lispro 100 units/mL injectable solution , 1 unit(s) injectable 3 times a day (before meals) 1 Unit(s) if Glucose 151 - 200  2 Unit(s) if Glucose 201 - 250  3 Unit(s) if Glucose 251 - 300  4 Unit(s) if Glucose 301 - 350  5 Unit(s) if Glucose 351 - 400  6 Unit(s) if Glucose Greater Than 400    Give correctional scale insulin  REGARDLESS of PO status NOTIFY Provider for blood glucose LESS THAN 70 milliGRAM(s)/deciLiter or GREATER THAN 400 milliGRAM(s)/deciLiter  lisinopril 20 mg oral tablet , 1 tab(s) orally once a day  Multiple Vitamins oral tablet , 1 tab(s) orally once a day  valproic acid 250 mg oral capsule , 1 cap(s) orally every 8 hours

## 2024-01-18 NOTE — BH PATIENT PROFILE - NSSUBRIEFINTERVENTION_PSY_A_CORE
unable to assess/Drinking or using substances at the unhealthy identified increases the risk of alcohol or substance abuse related health problems./Alcohol or substance use can interact with existing medical conditions and/or medication i.e. (Hypertension, depression, anxiety, insomnia, injury, congestive heart failure, diabetes, breast cancer risk)/If the patient has condition or takes medication with contraindications to drinking or substance use, it is recommended to abstain from drinking or substance use, or to drink or use substances below the recommended limits.

## 2024-01-18 NOTE — BH SOCIAL WORK INITIAL PSYCHOSOCIAL EVALUATION - OTHER PAST PSYCHIATRIC HISTORY (INCLUDE DETAILS REGARDING ONSET, COURSE OF ILLNESS, INPATIENT/OUTPATIENT TREATMENT)
Pt. is a 74 y/o [place] woman residing in [facility] since 2016 with a dx of Schizophrenia and Vascular Dementia with behavioral disturbance and multiple medical problems including Parkinson's Disease, Seizure Disorder, HTN, T2DM, Asthma, Gerd, and Hypothyroidism. The pt has a hx of multiple psychiatric hospitalizations including [facility] and [facility] 2021, and according to NH, 2 hospitalizations in 2023.  The pt has a history of agitation and aggression towards staff and peers but no hx of SI/SA/SIB.  Pt's [facility] [phone] , Stephanie Morales, reported that the pt is compliant with medication but due to nursing home policy of gradual dose reduction, the pt's Haldol dose was steadily decreased and stopped by 10/23. The pt became agitated and aggressive this past week. EMS was called 1/11/24 and the pt was brought to [facility], where she was evaluated medically, followed by C&L, and medically cleared for transfer to [facility] 1/17/24.

## 2024-01-18 NOTE — DIETITIAN INITIAL EVALUATION ADULT - PERTINENT MEDS FT
MEDICATIONS  (STANDING):  ammonium lactate 12% Lotion 1 Application(s) Topical two times a day  carbidopa/levodopa  25/100 1 Tablet(s) Oral three times a day  clonazePAM  Tablet 0.5 milliGRAM(s) Oral two times a day  clotrimazole 2% Vaginal Cream 1 Applicatorful Vaginal at bedtime  dextrose 5%. 1000 milliLiter(s) (100 mL/Hr) IV Continuous <Continuous>  dextrose 5%. 1000 milliLiter(s) (50 mL/Hr) IV Continuous <Continuous>  dextrose 50% Injectable 25 Gram(s) IV Push once  dextrose 50% Injectable 12.5 Gram(s) IV Push once  dextrose 50% Injectable 25 Gram(s) IV Push once  glucagon  Injectable 1 milliGRAM(s) IntraMuscular once  hemorrhoidal Ointment 1 Application(s) Rectal daily  insulin lispro (ADMELOG) corrective regimen sliding scale   SubCutaneous three times a day before meals  levothyroxine 75 MICROGram(s) Oral daily  lisinopril 20 milliGRAM(s) Oral daily  metoprolol tartrate 25 milliGRAM(s) Oral two times a day  multivitamin 1 Tablet(s) Oral daily  mupirocin 2% Nasal 1 Application(s) Both Nostrils two times a day  polyethylene glycol 3350 17 Gram(s) Oral every 12 hours  QUEtiapine 100 milliGRAM(s) Oral at bedtime  QUEtiapine 75 milliGRAM(s) Oral <User Schedule>  senna 2 Tablet(s) Oral at bedtime  valACYclovir 1000 milliGRAM(s) Oral every 12 hours  valproic acid 250 milliGRAM(s) Oral three times a day    MEDICATIONS  (PRN):  albuterol    90 MICROgram(s) HFA Inhaler 2 Puff(s) Inhalation every 6 hours PRN Shortness of Breath and/or Wheezing  dextrose Oral Gel 15 Gram(s) Oral once PRN Blood Glucose LESS THAN 70 milliGRAM(s)/deciliter  hydrocortisone 2.5% Rectal Cream 1 Application(s) Rectal two times a day PRN hemorrhoids  OLANZapine 5 milliGRAM(s) Oral every 6 hours PRN moderate agitation  OLANZapine Injectable 5 milliGRAM(s) IntraMuscular once PRN severe agitation, / aggression

## 2024-01-18 NOTE — DIETITIAN INITIAL EVALUATION ADULT - NSFNSGIASSESSMENTFT_GEN_A_CORE
Care coordination case closed today  due to unable to reach patient's daughter for the follow-up calls.  Case approved for closure.    No reports of GI distress (nausea/vomiting/diarrhea/ constipation) at this time

## 2024-01-18 NOTE — BH INPATIENT PSYCHIATRY PROGRESS NOTE - NSBHFUPINTERVALHXFT_PSY_A_CORE
PAtient seen for schizoaffective disorder. Transferred last night from [facility]. Patient agitated since admission trying to hit staff yell, got haldol 5mg Ativan 2mg last night then this AM got haldol 5mg Ativan 1mg IM and has been calmer. Ate some, took AM meds

## 2024-01-18 NOTE — BH INPATIENT PSYCHIATRY PROGRESS NOTE - PRN MEDS
MEDICATIONS  (PRN):  albuterol    90 MICROgram(s) HFA Inhaler 2 Puff(s) Inhalation every 6 hours PRN Shortness of Breath and/or Wheezing  dextrose Oral Gel 15 Gram(s) Oral once PRN Blood Glucose LESS THAN 70 milliGRAM(s)/deciliter  hydrocortisone 2.5% Rectal Cream 1 Application(s) Rectal two times a day PRN hemorrhoids  OLANZapine 5 milliGRAM(s) Oral every 6 hours PRN moderate agitation  OLANZapine Injectable 5 milliGRAM(s) IntraMuscular once PRN severe agitation, / aggression

## 2024-01-18 NOTE — BH INPATIENT PSYCHIATRY PROGRESS NOTE - NSBHCHARTREVIEWVS_PSY_A_CORE FT
Vital Signs Last 24 Hrs  T(C): 36.7 (01-17-24 @ 20:39), Max: 36.7 (01-17-24 @ 20:39)  T(F): 98 (01-17-24 @ 20:39), Max: 98 (01-17-24 @ 20:39)  HR: 68 (01-18-24 @ 10:58) (68 - 77)  BP: 117/53 (01-18-24 @ 10:58) (88/56 - 117/53)  BP(mean): --  RR: 18 (01-17-24 @ 20:39) (17 - 18)  SpO2: 100% (01-17-24 @ 20:39) (99% - 100%)

## 2024-01-18 NOTE — BH SOCIAL WORK INITIAL PSYCHOSOCIAL EVALUATION - CAREGIVER ADDRESS
Pt's sister is currently not well and not accepting calls. [place]. Pt's sister is physically unable to visit the pt but maintains phone contact.

## 2024-01-19 PROCEDURE — 99232 SBSQ HOSP IP/OBS MODERATE 35: CPT

## 2024-01-19 RX ORDER — HALOPERIDOL DECANOATE 100 MG/ML
5 INJECTION INTRAMUSCULAR ONCE
Refills: 0 | Status: COMPLETED | OUTPATIENT
Start: 2024-01-19 | End: 2024-01-19

## 2024-01-19 RX ORDER — OLANZAPINE 15 MG/1
5 TABLET, FILM COATED ORAL ONCE
Refills: 0 | Status: COMPLETED | OUTPATIENT
Start: 2024-01-19 | End: 2024-01-19

## 2024-01-19 RX ORDER — HALOPERIDOL DECANOATE 100 MG/ML
5 INJECTION INTRAMUSCULAR ONCE
Refills: 0 | Status: DISCONTINUED | OUTPATIENT
Start: 2024-01-19 | End: 2024-02-23

## 2024-01-19 RX ORDER — HALOPERIDOL DECANOATE 100 MG/ML
5 INJECTION INTRAMUSCULAR EVERY 6 HOURS
Refills: 0 | Status: DISCONTINUED | OUTPATIENT
Start: 2024-01-19 | End: 2024-02-23

## 2024-01-19 RX ORDER — HALOPERIDOL DECANOATE 100 MG/ML
5 INJECTION INTRAMUSCULAR ONCE
Refills: 0 | Status: DISCONTINUED | OUTPATIENT
Start: 2024-01-19 | End: 2024-02-29

## 2024-01-19 RX ORDER — OLANZAPINE 15 MG/1
5 TABLET, FILM COATED ORAL
Refills: 0 | Status: DISCONTINUED | OUTPATIENT
Start: 2024-01-19 | End: 2024-01-23

## 2024-01-19 RX ORDER — CLONAZEPAM 1 MG
0.5 TABLET ORAL
Refills: 0 | Status: DISCONTINUED | OUTPATIENT
Start: 2024-01-19 | End: 2024-01-26

## 2024-01-19 RX ADMIN — Medication 1 MILLIGRAM(S): at 22:20

## 2024-01-19 RX ADMIN — MUPIROCIN 1 APPLICATION(S): 20 OINTMENT TOPICAL at 22:22

## 2024-01-19 RX ADMIN — OLANZAPINE 5 MILLIGRAM(S): 15 TABLET, FILM COATED ORAL at 16:58

## 2024-01-19 RX ADMIN — HALOPERIDOL DECANOATE 5 MILLIGRAM(S): 100 INJECTION INTRAMUSCULAR at 12:39

## 2024-01-19 RX ADMIN — DIVALPROEX SODIUM 500 MILLIGRAM(S): 500 TABLET, DELAYED RELEASE ORAL at 08:38

## 2024-01-19 RX ADMIN — CARBIDOPA AND LEVODOPA 1 TABLET(S): 25; 100 TABLET ORAL at 22:21

## 2024-01-19 RX ADMIN — CARBIDOPA AND LEVODOPA 1 TABLET(S): 25; 100 TABLET ORAL at 08:38

## 2024-01-19 RX ADMIN — HALOPERIDOL DECANOATE 5 MILLIGRAM(S): 100 INJECTION INTRAMUSCULAR at 22:19

## 2024-01-19 RX ADMIN — Medication 0.5 MILLIGRAM(S): at 16:58

## 2024-01-19 RX ADMIN — Medication 25 MILLIGRAM(S): at 08:38

## 2024-01-19 RX ADMIN — LISINOPRIL 20 MILLIGRAM(S): 2.5 TABLET ORAL at 08:39

## 2024-01-19 RX ADMIN — Medication 25 MILLIGRAM(S): at 22:20

## 2024-01-19 RX ADMIN — VALACYCLOVIR 1000 MILLIGRAM(S): 500 TABLET, FILM COATED ORAL at 22:21

## 2024-01-19 RX ADMIN — OLANZAPINE 5 MILLIGRAM(S): 15 TABLET, FILM COATED ORAL at 00:20

## 2024-01-19 RX ADMIN — SENNA PLUS 2 TABLET(S): 8.6 TABLET ORAL at 22:21

## 2024-01-19 RX ADMIN — HALOPERIDOL DECANOATE 5 MILLIGRAM(S): 100 INJECTION INTRAMUSCULAR at 06:11

## 2024-01-19 RX ADMIN — CARBIDOPA AND LEVODOPA 1 TABLET(S): 25; 100 TABLET ORAL at 12:07

## 2024-01-19 RX ADMIN — DIVALPROEX SODIUM 500 MILLIGRAM(S): 500 TABLET, DELAYED RELEASE ORAL at 22:21

## 2024-01-19 RX ADMIN — Medication 1 APPLICATORFUL: at 22:22

## 2024-01-19 RX ADMIN — OLANZAPINE 5 MILLIGRAM(S): 15 TABLET, FILM COATED ORAL at 08:38

## 2024-01-19 RX ADMIN — Medication 0.5 MILLIGRAM(S): at 08:39

## 2024-01-19 RX ADMIN — Medication 2 MILLIGRAM(S): at 06:12

## 2024-01-19 NOTE — BH INPATIENT PSYCHIATRY PROGRESS NOTE - NSBHASSESSSUMMFT_PSY_ALL_CORE
- would consider Parkinson's Dementia as a diagnosis in this case more then Vascular Dementia  - consider switching out Seroquel to Zyprexa as Pt's symptoms and behaviors showed response to it when used PRN  - unclear what etiology the charted "seizure" history is   - UNCLEAR why patient is still on Valacyclovir 1000mg PO bid (was on it on admission as well) - medical notes do not mention this   1/18 Patient agitated labile aggressive. Spoke with ISREAL Aguillon from facility. Patient was on Seroquel and haldol but haldol tapered then dced 10/23 due to EPS which may have eventually led to decompensation. She feels these epsiodes of illness are manic like c/w schizoaffective dx.  Patient compliant in facility most of time. Diet there was mild thickened fluid and soft food. Suspect patient has Parkinsonism not PD given tremor occurring on 10mg/d haldol. Seroquel not effective as monotherapy. Will change to olanzapine consider some reduction in Sinemet, will increase Depakote and change to Sprinkles and target blood level over 50 especially since she has sz disorder. Consider attempt to at least reduce klonopin  1/19 Patient agitated, psychotic, suspect manic component. Will increase po olanzapine but given poor response to IM olanzapine and lability to leverage synergistic use of BDZ will make haldol /Ativan the prn hopefully if olanzapine effective will see reduced use of haldol prn. Patient may if not responding to olanzpine go back to haldol which worked but had EPS issues. Will plan recheck VPA  level next week possibly increase Depakote - would consider Parkinson's Dementia as a diagnosis in this case more then Vascular Dementia  - consider switching out Seroquel to Zyprexa as Pt's symptoms and behaviors showed response to it when used PRN  - unclear what etiology the charted "seizure" history is   - UNCLEAR why patient is still on Valacyclovir 1000mg PO bid (was on it on admission as well) - medical notes do not mention this   1/18 Patient agitated labile aggressive. Spoke with NP Pal from facility. Patient was on Seroquel and haldol but haldol tapered then dced 10/23 due to EPS which may have eventually led to decompensation. She feels these epsiodes of illness are manic like c/w schizoaffective dx.  Patient compliant in facility most of time. Diet there was mild thickened fluid and soft food. Suspect patient has Parkinsonism not PD given tremor occurring on 10mg/d haldol. Seroquel not effective as monotherapy. Will change to olanzapine consider some reduction in Sinemet, will increase Depakote and change to Sprinkles and target blood level over 50 especially since she has sz disorder. Consider attempt to at least reduce klonopin  1/19 Patient agitated, psychotic, suspect manic component. Will increase po olanzapine but given poor response to IM olanzapine and lability to leverage synergistic use of BDZ will make haldol /Ativan the prn hopefully if olanzapine effective will see reduced use of haldol prn. Patient may if not responding to olanzpine go back to haldol which worked but had EPS issues. Will plan recheck VPA  level next week possibly increase Depakote. Ekg ordered by resident but refused. Given baseline Qtc of 428 feel safe to defer EKG til patient calmer

## 2024-01-19 NOTE — PHYSICAL THERAPY INITIAL EVALUATION ADULT - PERTINENT HX OF CURRENT PROBLEM, REHAB EVAL
Patient is a 74 yo F, domiciled at [facility]  for past 8 years, with psychiatric hx of Schizophrenia, Vascular dementia with behavioral disturbance; multiple inpatient psych admissions, no known suicidality/SIB; h/o threatening violence towards staff;  PMH of seizure disorder, Parkinson's disease, HTN, T2DM, asthma, hypothyroidism, retinopathy with macular edema, GERD, h/o malignant neoplasm of bladder. Patient was sent to [facility] ED on 1/11/24 for increasing agitation, yelling and verbally abusing staff at the nursing home

## 2024-01-19 NOTE — BH INPATIENT PSYCHIATRY PROGRESS NOTE - NSBHFUPINTERVALHXFT_PSY_A_CORE
Patient seen for schizoaffective disorder. Patient agitated lat night trying to hit staff, intrusive  with peers with concern could have escalated, yelling, got olanzapine IM 5mg x2 with poor effect, then haldol 5mg /Ativan 2mg with positive effect . Patient calmer ate breakfast, cooperative with meds. Then in PM again escalating , appearing religiously pre-occupied praying, then dancing, received prn.

## 2024-01-19 NOTE — BH INPATIENT PSYCHIATRY PROGRESS NOTE - NSBHCHARTREVIEWVS_PSY_A_CORE FT
Vital Signs Last 24 Hrs  T(C): 36.6 (01-19-24 @ 08:29), Max: 36.6 (01-19-24 @ 08:29)  T(F): 97.9 (01-19-24 @ 08:29), Max: 97.9 (01-19-24 @ 08:29)  HR: 91 (01-19-24 @ 08:29) (91 - 91)  BP: 134/70 (01-19-24 @ 08:29) (134/70 - 134/70)  BP(mean): --  RR: --  SpO2: --

## 2024-01-19 NOTE — BH INPATIENT PSYCHIATRY PROGRESS NOTE - MSE UNSTRUCTURED FT
Patient with limited  cooperation, easily distracted. She has some tremor, walks well , no shuffling, no cogwheeling at elbows, wrists. Speech hard to understand due to poor articulation and edentulous. Mood perplexed, affect labile appearing elevated or irritable/angry. Patient answers yes/no denies feeling anyone here is trying to hurt her. Denies SI/HI. Says she hears voices cannot specify content but denies commands. Thinking disjointed. Cannot assess cognition. Poor insight and judgement, is impulsive.

## 2024-01-19 NOTE — BH INPATIENT PSYCHIATRY PROGRESS NOTE - PRN MEDS
MEDICATIONS  (PRN):  albuterol    90 MICROgram(s) HFA Inhaler 2 Puff(s) Inhalation every 6 hours PRN Shortness of Breath and/or Wheezing  dextrose Oral Gel 15 Gram(s) Oral once PRN Blood Glucose LESS THAN 70 milliGRAM(s)/deciliter  haloperidol     Tablet 5 milliGRAM(s) Oral every 6 hours PRN agitation  haloperidol    Injectable 5 milliGRAM(s) IntraMuscular once PRN Severe Agitation  haloperidol    Injectable 5 milliGRAM(s) IntraMuscular once PRN severe agitation  hydrocortisone 2.5% Rectal Cream 1 Application(s) Rectal two times a day PRN hemorrhoids  LORazepam     Tablet 1 milliGRAM(s) Oral every 6 hours PRN agitation  LORazepam   Injectable 1 milliGRAM(s) IntraMuscular once PRN agitation  LORazepam   Injectable 2 milliGRAM(s) IntraMuscular once PRN Severe agitation   MEDICATIONS  (PRN):  albuterol    90 MICROgram(s) HFA Inhaler 2 Puff(s) Inhalation every 6 hours PRN Shortness of Breath and/or Wheezing  dextrose Oral Gel 15 Gram(s) Oral once PRN Blood Glucose LESS THAN 70 milliGRAM(s)/deciliter  haloperidol     Tablet 5 milliGRAM(s) Oral every 6 hours PRN agitation  haloperidol    Injectable 5 milliGRAM(s) IntraMuscular once PRN severe agitation  haloperidol    Injectable 5 milliGRAM(s) IntraMuscular once PRN Severe Agitation  hydrocortisone 2.5% Rectal Cream 1 Application(s) Rectal two times a day PRN hemorrhoids  LORazepam     Tablet 1 milliGRAM(s) Oral every 6 hours PRN agitation  LORazepam   Injectable 1 milliGRAM(s) IntraMuscular once PRN agitation  LORazepam   Injectable 2 milliGRAM(s) IntraMuscular once PRN Severe agitation

## 2024-01-19 NOTE — BH INPATIENT PSYCHIATRY PROGRESS NOTE - CURRENT MEDICATION
MEDICATIONS  (STANDING):  ammonium lactate 12% Lotion 1 Application(s) Topical two times a day  carbidopa/levodopa  25/100 1 Tablet(s) Oral three times a day  clonazePAM Oral Disintegrating Tablet 0.5 milliGRAM(s) Oral <User Schedule>  clotrimazole 2% Vaginal Cream 1 Applicatorful Vaginal at bedtime  dextrose 5%. 1000 milliLiter(s) (50 mL/Hr) IV Continuous <Continuous>  dextrose 5%. 1000 milliLiter(s) (100 mL/Hr) IV Continuous <Continuous>  dextrose 50% Injectable 25 Gram(s) IV Push once  dextrose 50% Injectable 12.5 Gram(s) IV Push once  dextrose 50% Injectable 25 Gram(s) IV Push once  divalproex Sprinkle 500 milliGRAM(s) Oral two times a day  glucagon  Injectable 1 milliGRAM(s) IntraMuscular once  hemorrhoidal Ointment 1 Application(s) Rectal daily  insulin lispro (ADMELOG) corrective regimen sliding scale   SubCutaneous three times a day before meals  levothyroxine 75 MICROGram(s) Oral daily  lisinopril 20 milliGRAM(s) Oral daily  metoprolol tartrate 25 milliGRAM(s) Oral two times a day  mupirocin 2% Ointment 1 Application(s) Topical two times a day  OLANZapine Disintegrating Tablet 5 milliGRAM(s) Oral <User Schedule>  polyethylene glycol 3350 17 Gram(s) Oral daily  senna 2 Tablet(s) Oral at bedtime  valACYclovir 1000 milliGRAM(s) Oral every 12 hours    MEDICATIONS  (PRN):  albuterol    90 MICROgram(s) HFA Inhaler 2 Puff(s) Inhalation every 6 hours PRN Shortness of Breath and/or Wheezing  dextrose Oral Gel 15 Gram(s) Oral once PRN Blood Glucose LESS THAN 70 milliGRAM(s)/deciliter  haloperidol     Tablet 5 milliGRAM(s) Oral every 6 hours PRN agitation  haloperidol    Injectable 5 milliGRAM(s) IntraMuscular once PRN Severe Agitation  haloperidol    Injectable 5 milliGRAM(s) IntraMuscular once PRN severe agitation  hydrocortisone 2.5% Rectal Cream 1 Application(s) Rectal two times a day PRN hemorrhoids  LORazepam     Tablet 1 milliGRAM(s) Oral every 6 hours PRN agitation  LORazepam   Injectable 1 milliGRAM(s) IntraMuscular once PRN agitation  LORazepam   Injectable 2 milliGRAM(s) IntraMuscular once PRN Severe agitation   MEDICATIONS  (STANDING):  ammonium lactate 12% Lotion 1 Application(s) Topical two times a day  carbidopa/levodopa  25/100 1 Tablet(s) Oral three times a day  clonazePAM Oral Disintegrating Tablet 0.5 milliGRAM(s) Oral <User Schedule>  clotrimazole 2% Vaginal Cream 1 Applicatorful Vaginal at bedtime  dextrose 5%. 1000 milliLiter(s) (50 mL/Hr) IV Continuous <Continuous>  dextrose 5%. 1000 milliLiter(s) (100 mL/Hr) IV Continuous <Continuous>  dextrose 50% Injectable 25 Gram(s) IV Push once  dextrose 50% Injectable 25 Gram(s) IV Push once  dextrose 50% Injectable 12.5 Gram(s) IV Push once  divalproex Sprinkle 500 milliGRAM(s) Oral two times a day  glucagon  Injectable 1 milliGRAM(s) IntraMuscular once  hemorrhoidal Ointment 1 Application(s) Rectal daily  insulin lispro (ADMELOG) corrective regimen sliding scale   SubCutaneous three times a day before meals  levothyroxine 75 MICROGram(s) Oral daily  lisinopril 20 milliGRAM(s) Oral daily  metoprolol tartrate 25 milliGRAM(s) Oral two times a day  mupirocin 2% Ointment 1 Application(s) Topical two times a day  OLANZapine Disintegrating Tablet 5 milliGRAM(s) Oral <User Schedule>  polyethylene glycol 3350 17 Gram(s) Oral daily  senna 2 Tablet(s) Oral at bedtime  valACYclovir 1000 milliGRAM(s) Oral every 12 hours    MEDICATIONS  (PRN):  albuterol    90 MICROgram(s) HFA Inhaler 2 Puff(s) Inhalation every 6 hours PRN Shortness of Breath and/or Wheezing  dextrose Oral Gel 15 Gram(s) Oral once PRN Blood Glucose LESS THAN 70 milliGRAM(s)/deciliter  haloperidol     Tablet 5 milliGRAM(s) Oral every 6 hours PRN agitation  haloperidol    Injectable 5 milliGRAM(s) IntraMuscular once PRN severe agitation  haloperidol    Injectable 5 milliGRAM(s) IntraMuscular once PRN Severe Agitation  hydrocortisone 2.5% Rectal Cream 1 Application(s) Rectal two times a day PRN hemorrhoids  LORazepam     Tablet 1 milliGRAM(s) Oral every 6 hours PRN agitation  LORazepam   Injectable 1 milliGRAM(s) IntraMuscular once PRN agitation  LORazepam   Injectable 2 milliGRAM(s) IntraMuscular once PRN Severe agitation

## 2024-01-19 NOTE — BH CHART NOTE - NSEVENTNOTEFT_PSY_ALL_CORE
Pt severely agitated, targeting peer and staff, attempting to hit peers, unable to be redirected, refused PO PRNs. Requiring two rounds of IM olanzapine 5 mg at 22:21 and 00:20. Reached TDD olanzapine 15 mg per orders. Per staff, IM olanzapine with minimal effect. Per staff, request to change PRNs back to Haldol/Ativan as noted to have better effect over olanzapine. SAUL paged at 02:50, pt severely agitated, screaming, targeting peer, threatening staff, unable to be redirected and refusing PO PRNs. Activated IM Haldol 5 mg and lorazepam 2 mg (of note, has been greater than 2 hours since last IM olanzapine administration) for threat to safety of pt and others.

## 2024-01-19 NOTE — BH INPATIENT PSYCHIATRY PROGRESS NOTE - NSBHMETABOLIC_PSY_ALL_CORE_FT
BMI: BMI (kg/m2): 30.2 (01-15-24 @ 12:52)  HbA1c: A1C with Estimated Average Glucose Result: 7.6 % (01-12-24 @ 12:40)    Glucose: POCT Blood Glucose.: 195 mg/dL (01-18-24 @ 16:08)    BP: 134/70 (01-19-24 @ 08:29) (88/56 - 134/70)Vital Signs Last 24 Hrs  T(C): 36.6 (01-19-24 @ 08:29), Max: 36.6 (01-19-24 @ 08:29)  T(F): 97.9 (01-19-24 @ 08:29), Max: 97.9 (01-19-24 @ 08:29)  HR: 91 (01-19-24 @ 08:29) (91 - 91)  BP: 134/70 (01-19-24 @ 08:29) (134/70 - 134/70)  BP(mean): --  RR: --  SpO2: --      Lipid Panel:

## 2024-01-20 LAB
GLUCOSE BLDC GLUCOMTR-MCNC: 191 MG/DL — HIGH (ref 70–99)
GLUCOSE BLDC GLUCOMTR-MCNC: 242 MG/DL — HIGH (ref 70–99)
GLUCOSE BLDC GLUCOMTR-MCNC: 263 MG/DL — HIGH (ref 70–99)

## 2024-01-20 PROCEDURE — 99232 SBSQ HOSP IP/OBS MODERATE 35: CPT

## 2024-01-20 RX ORDER — HALOPERIDOL DECANOATE 100 MG/ML
5 INJECTION INTRAMUSCULAR ONCE
Refills: 0 | Status: COMPLETED | OUTPATIENT
Start: 2024-01-20 | End: 2024-01-20

## 2024-01-20 RX ADMIN — Medication 3: at 17:49

## 2024-01-20 RX ADMIN — Medication 2 MILLIGRAM(S): at 10:34

## 2024-01-20 RX ADMIN — HALOPERIDOL DECANOATE 5 MILLIGRAM(S): 100 INJECTION INTRAMUSCULAR at 15:19

## 2024-01-20 RX ADMIN — DIVALPROEX SODIUM 500 MILLIGRAM(S): 500 TABLET, DELAYED RELEASE ORAL at 21:15

## 2024-01-20 RX ADMIN — Medication 1 MILLIGRAM(S): at 04:20

## 2024-01-20 RX ADMIN — SENNA PLUS 2 TABLET(S): 8.6 TABLET ORAL at 21:15

## 2024-01-20 RX ADMIN — OLANZAPINE 5 MILLIGRAM(S): 15 TABLET, FILM COATED ORAL at 17:50

## 2024-01-20 RX ADMIN — HALOPERIDOL DECANOATE 5 MILLIGRAM(S): 100 INJECTION INTRAMUSCULAR at 10:34

## 2024-01-20 RX ADMIN — Medication 75 MICROGRAM(S): at 06:37

## 2024-01-20 RX ADMIN — HALOPERIDOL DECANOATE 5 MILLIGRAM(S): 100 INJECTION INTRAMUSCULAR at 04:19

## 2024-01-20 RX ADMIN — CARBIDOPA AND LEVODOPA 1 TABLET(S): 25; 100 TABLET ORAL at 21:16

## 2024-01-20 NOTE — BH INPATIENT PSYCHIATRY PROGRESS NOTE - NSBHFUPINTERVALHXFT_PSY_A_CORE
Patient seen for schizoaffective disorder. Patient was agitated overnight, dancing, intrusive, slamming a newspaper on the ground and turning on the light on other patient's rooms while they were trying to sleep. She was unresponsive to redirection. Was medicated with haldol 5mg/Ativan 1 mg at 4:20 in the morning.     Pt refused vital signs this morning. She was seen in the dining area early in the morning eating a fruit cup. Pt was loud and demanding, asking writer for a spoon, then for dinner: "where's dinner? it's around 5pm no?". Writer provided orientation, explained it's actually 7am Saturday morning and breakfast will not be here for another hour or so. Pt seemed upset and said "What!?, what are you talking about?". Denied acute complaints other than asking for dinner or breakfast. Unable to engage meaningfully in conversation. Later seen sleeping in the day room, under no visible distress. Ordered bloodwork for tomorrow AM.

## 2024-01-20 NOTE — BH INPATIENT PSYCHIATRY PROGRESS NOTE - NSBHASSESSSUMMFT_PSY_ALL_CORE
- would consider Parkinson's Dementia as a diagnosis in this case more then Vascular Dementia  - consider switching out Seroquel to Zyprexa as Pt's symptoms and behaviors showed response to it when used PRN  - unclear what etiology the charted "seizure" history is   - UNCLEAR why patient is still on Valacyclovir 1000mg PO bid (was on it on admission as well) - medical notes do not mention this   1/18 Patient agitated labile aggressive. Spoke with NP Pal from facility. Patient was on Seroquel and haldol but haldol tapered then dced 10/23 due to EPS which may have eventually led to decompensation. She feels these epsiodes of illness are manic like c/w schizoaffective dx.  Patient compliant in facility most of time. Diet there was mild thickened fluid and soft food. Suspect patient has Parkinsonism not PD given tremor occurring on 10mg/d haldol. Seroquel not effective as monotherapy. Will change to olanzapine consider some reduction in Sinemet, will increase Depakote and change to Sprinkles and target blood level over 50 especially since she has sz disorder. Consider attempt to at least reduce klonopin  1/19 Patient agitated, psychotic, suspect manic component. Will increase po olanzapine but given poor response to IM olanzapine and lability to leverage synergistic use of BDZ will make haldol /Ativan the prn hopefully if olanzapine effective will see reduced use of haldol prn. Patient may if not responding to olanzpine go back to haldol which worked but had EPS issues. Will plan recheck VPA  level next week possibly increase Depakote. Ekg ordered by resident but refused. Given baseline Qtc of 428 feel safe to defer EKG til patient calmer  1/20: Agitated overnight, dancing, slamming newspaper on the floor and turning on lights on sleeping peer's rooms. Medicated with PRN Haldol 5mg/Ativan 1mg around 4:20AM. Irritable, confused and demanding during encounter, unable to engage meaningfully. Refused EKG yesterday. Ordered bloodwork for tomorrow in case pt is more emenable. Refusing vital signs.

## 2024-01-20 NOTE — BH INPATIENT PSYCHIATRY PROGRESS NOTE - CURRENT MEDICATION
MEDICATIONS  (STANDING):  ammonium lactate 12% Lotion 1 Application(s) Topical two times a day  carbidopa/levodopa  25/100 1 Tablet(s) Oral three times a day  clonazePAM Oral Disintegrating Tablet 0.5 milliGRAM(s) Oral <User Schedule>  clotrimazole 2% Vaginal Cream 1 Applicatorful Vaginal at bedtime  dextrose 5%. 1000 milliLiter(s) (50 mL/Hr) IV Continuous <Continuous>  dextrose 5%. 1000 milliLiter(s) (100 mL/Hr) IV Continuous <Continuous>  dextrose 50% Injectable 25 Gram(s) IV Push once  dextrose 50% Injectable 25 Gram(s) IV Push once  dextrose 50% Injectable 12.5 Gram(s) IV Push once  divalproex Sprinkle 500 milliGRAM(s) Oral two times a day  glucagon  Injectable 1 milliGRAM(s) IntraMuscular once  hemorrhoidal Ointment 1 Application(s) Rectal daily  insulin lispro (ADMELOG) corrective regimen sliding scale   SubCutaneous three times a day before meals  levothyroxine 75 MICROGram(s) Oral daily  lisinopril 20 milliGRAM(s) Oral daily  metoprolol tartrate 25 milliGRAM(s) Oral two times a day  mupirocin 2% Ointment 1 Application(s) Topical two times a day  OLANZapine Disintegrating Tablet 5 milliGRAM(s) Oral <User Schedule>  polyethylene glycol 3350 17 Gram(s) Oral daily  senna 2 Tablet(s) Oral at bedtime  valACYclovir 1000 milliGRAM(s) Oral every 12 hours    MEDICATIONS  (PRN):  albuterol    90 MICROgram(s) HFA Inhaler 2 Puff(s) Inhalation every 6 hours PRN Shortness of Breath and/or Wheezing  dextrose Oral Gel 15 Gram(s) Oral once PRN Blood Glucose LESS THAN 70 milliGRAM(s)/deciliter  haloperidol     Tablet 5 milliGRAM(s) Oral every 6 hours PRN agitation  haloperidol    Injectable 5 milliGRAM(s) IntraMuscular once PRN Severe Agitation  haloperidol    Injectable 5 milliGRAM(s) IntraMuscular once PRN severe agitation  hydrocortisone 2.5% Rectal Cream 1 Application(s) Rectal two times a day PRN hemorrhoids  LORazepam     Tablet 1 milliGRAM(s) Oral every 6 hours PRN agitation  LORazepam   Injectable 2 milliGRAM(s) IntraMuscular once PRN Severe agitation  LORazepam   Injectable 1 milliGRAM(s) IntraMuscular once PRN agitation

## 2024-01-20 NOTE — BH INPATIENT PSYCHIATRY PROGRESS NOTE - PRN MEDS
MEDICATIONS  (PRN):  albuterol    90 MICROgram(s) HFA Inhaler 2 Puff(s) Inhalation every 6 hours PRN Shortness of Breath and/or Wheezing  dextrose Oral Gel 15 Gram(s) Oral once PRN Blood Glucose LESS THAN 70 milliGRAM(s)/deciliter  haloperidol     Tablet 5 milliGRAM(s) Oral every 6 hours PRN agitation  haloperidol    Injectable 5 milliGRAM(s) IntraMuscular once PRN Severe Agitation  haloperidol    Injectable 5 milliGRAM(s) IntraMuscular once PRN severe agitation  hydrocortisone 2.5% Rectal Cream 1 Application(s) Rectal two times a day PRN hemorrhoids  LORazepam     Tablet 1 milliGRAM(s) Oral every 6 hours PRN agitation  LORazepam   Injectable 2 milliGRAM(s) IntraMuscular once PRN Severe agitation  LORazepam   Injectable 1 milliGRAM(s) IntraMuscular once PRN agitation

## 2024-01-20 NOTE — BH INPATIENT PSYCHIATRY PROGRESS NOTE - NSBHMETABOLIC_PSY_ALL_CORE_FT
BMI: BMI (kg/m2): 30.2 (01-15-24 @ 12:52)  HbA1c: A1C with Estimated Average Glucose Result: 7.6 % (01-12-24 @ 12:40)    Glucose: POCT Blood Glucose.: 242 mg/dL (01-20-24 @ 07:55)    BP: 134/70 (01-19-24 @ 08:29) (88/56 - 134/70)Vital Signs Last 24 Hrs  T(C): --  T(F): --  HR: --  BP: --  BP(mean): --  RR: --  SpO2: --      Lipid Panel:

## 2024-01-21 LAB
GLUCOSE BLDC GLUCOMTR-MCNC: 162 MG/DL — HIGH (ref 70–99)
GLUCOSE BLDC GLUCOMTR-MCNC: 168 MG/DL — HIGH (ref 70–99)
GLUCOSE BLDC GLUCOMTR-MCNC: 246 MG/DL — HIGH (ref 70–99)

## 2024-01-21 PROCEDURE — 99231 SBSQ HOSP IP/OBS SF/LOW 25: CPT

## 2024-01-21 RX ORDER — HALOPERIDOL DECANOATE 100 MG/ML
5 INJECTION INTRAMUSCULAR ONCE
Refills: 0 | Status: COMPLETED | OUTPATIENT
Start: 2024-01-21 | End: 2024-01-21

## 2024-01-21 RX ADMIN — Medication 2 MILLIGRAM(S): at 23:45

## 2024-01-21 RX ADMIN — Medication 75 MICROGRAM(S): at 08:06

## 2024-01-21 RX ADMIN — CARBIDOPA AND LEVODOPA 1 TABLET(S): 25; 100 TABLET ORAL at 09:41

## 2024-01-21 RX ADMIN — Medication 2 MILLIGRAM(S): at 01:37

## 2024-01-21 RX ADMIN — DIVALPROEX SODIUM 500 MILLIGRAM(S): 500 TABLET, DELAYED RELEASE ORAL at 21:26

## 2024-01-21 RX ADMIN — Medication 25 MILLIGRAM(S): at 21:26

## 2024-01-21 RX ADMIN — Medication 0.5 MILLIGRAM(S): at 09:39

## 2024-01-21 RX ADMIN — SENNA PLUS 2 TABLET(S): 8.6 TABLET ORAL at 21:25

## 2024-01-21 RX ADMIN — OLANZAPINE 5 MILLIGRAM(S): 15 TABLET, FILM COATED ORAL at 13:08

## 2024-01-21 RX ADMIN — Medication 25 MILLIGRAM(S): at 09:41

## 2024-01-21 RX ADMIN — DIVALPROEX SODIUM 500 MILLIGRAM(S): 500 TABLET, DELAYED RELEASE ORAL at 09:44

## 2024-01-21 RX ADMIN — OLANZAPINE 5 MILLIGRAM(S): 15 TABLET, FILM COATED ORAL at 17:14

## 2024-01-21 RX ADMIN — LISINOPRIL 20 MILLIGRAM(S): 2.5 TABLET ORAL at 09:41

## 2024-01-21 RX ADMIN — CARBIDOPA AND LEVODOPA 1 TABLET(S): 25; 100 TABLET ORAL at 13:08

## 2024-01-21 RX ADMIN — CARBIDOPA AND LEVODOPA 1 TABLET(S): 25; 100 TABLET ORAL at 21:26

## 2024-01-21 RX ADMIN — HALOPERIDOL DECANOATE 5 MILLIGRAM(S): 100 INJECTION INTRAMUSCULAR at 23:45

## 2024-01-21 RX ADMIN — OLANZAPINE 5 MILLIGRAM(S): 15 TABLET, FILM COATED ORAL at 09:39

## 2024-01-21 RX ADMIN — HALOPERIDOL DECANOATE 5 MILLIGRAM(S): 100 INJECTION INTRAMUSCULAR at 01:37

## 2024-01-21 RX ADMIN — VALACYCLOVIR 1000 MILLIGRAM(S): 500 TABLET, FILM COATED ORAL at 21:27

## 2024-01-21 RX ADMIN — Medication 0.5 MILLIGRAM(S): at 17:15

## 2024-01-21 NOTE — BH INPATIENT PSYCHIATRY PROGRESS NOTE - NSBHMETABOLIC_PSY_ALL_CORE_FT
BMI: BMI (kg/m2): 30.2 (01-15-24 @ 12:52)  HbA1c: A1C with Estimated Average Glucose Result: 7.6 % (01-12-24 @ 12:40)    Glucose: POCT Blood Glucose.: 168 mg/dL (01-21-24 @ 12:28)    BP: 134/70 (01-19-24 @ 08:29) (134/70 - 134/70)Vital Signs Last 24 Hrs  T(C): --  T(F): --  HR: --  BP: --  BP(mean): --  RR: --  SpO2: --    Orthostatic VS  01-21-24 @ 08:44  Lying BP: --/-- HR: --  Sitting BP: 125/64 HR: 76  Standing BP: 122/69 HR: 84  Site: upper right arm  Mode: electronic    Lipid Panel:

## 2024-01-21 NOTE — BH INPATIENT PSYCHIATRY PROGRESS NOTE - CURRENT MEDICATION
MEDICATIONS  (STANDING):  ammonium lactate 12% Lotion 1 Application(s) Topical two times a day  carbidopa/levodopa  25/100 1 Tablet(s) Oral three times a day  clonazePAM Oral Disintegrating Tablet 0.5 milliGRAM(s) Oral <User Schedule>  dextrose 5%. 1000 milliLiter(s) (50 mL/Hr) IV Continuous <Continuous>  dextrose 5%. 1000 milliLiter(s) (100 mL/Hr) IV Continuous <Continuous>  dextrose 50% Injectable 25 Gram(s) IV Push once  dextrose 50% Injectable 12.5 Gram(s) IV Push once  dextrose 50% Injectable 25 Gram(s) IV Push once  divalproex Sprinkle 500 milliGRAM(s) Oral two times a day  glucagon  Injectable 1 milliGRAM(s) IntraMuscular once  hemorrhoidal Ointment 1 Application(s) Rectal daily  insulin lispro (ADMELOG) corrective regimen sliding scale   SubCutaneous three times a day before meals  levothyroxine 75 MICROGram(s) Oral daily  lisinopril 20 milliGRAM(s) Oral daily  metoprolol tartrate 25 milliGRAM(s) Oral two times a day  mupirocin 2% Ointment 1 Application(s) Topical two times a day  OLANZapine Disintegrating Tablet 5 milliGRAM(s) Oral <User Schedule>  polyethylene glycol 3350 17 Gram(s) Oral daily  senna 2 Tablet(s) Oral at bedtime  valACYclovir 1000 milliGRAM(s) Oral every 12 hours    MEDICATIONS  (PRN):  albuterol    90 MICROgram(s) HFA Inhaler 2 Puff(s) Inhalation every 6 hours PRN Shortness of Breath and/or Wheezing  dextrose Oral Gel 15 Gram(s) Oral once PRN Blood Glucose LESS THAN 70 milliGRAM(s)/deciliter  haloperidol     Tablet 5 milliGRAM(s) Oral every 6 hours PRN agitation  haloperidol    Injectable 5 milliGRAM(s) IntraMuscular once PRN severe agitation  haloperidol    Injectable 5 milliGRAM(s) IntraMuscular once PRN Severe Agitation  hydrocortisone 2.5% Rectal Cream 1 Application(s) Rectal two times a day PRN hemorrhoids  LORazepam     Tablet 1 milliGRAM(s) Oral every 6 hours PRN agitation  LORazepam   Injectable 1 milliGRAM(s) IntraMuscular once PRN agitation  LORazepam   Injectable 2 milliGRAM(s) IntraMuscular once PRN Severe agitation

## 2024-01-21 NOTE — BH INPATIENT PSYCHIATRY PROGRESS NOTE - NSBHFUPINTERVALHXFT_PSY_A_CORE
Patient seen for schizoaffective disorder, chart reviewed. Per staff, pt around 1:15 am was agitated, pacing and running the hallway, yelling, screaming, disrupting the unit, and verbally aggressive. PO PRN refused. Administered Ativan 2 mg IM and Haldol 5 mg IM at 1:36 am as per order. Patient was not cooperative with injection, needed manually restraint from 1:36 am-1:37 am for injection. Pt complied with medication this am, allowed vital signs this morning, noted stable, but refused labs. She was seen in the dining area early in the morning eating breakfast, covering her head with a sheet. Pt refused to cooperate with interview stating she is busy, does not make eye contact. Later seen sitting in the day room, under no visible distress.

## 2024-01-21 NOTE — BH INPATIENT PSYCHIATRY PROGRESS NOTE - PRN MEDS
MEDICATIONS  (PRN):  albuterol    90 MICROgram(s) HFA Inhaler 2 Puff(s) Inhalation every 6 hours PRN Shortness of Breath and/or Wheezing  dextrose Oral Gel 15 Gram(s) Oral once PRN Blood Glucose LESS THAN 70 milliGRAM(s)/deciliter  haloperidol     Tablet 5 milliGRAM(s) Oral every 6 hours PRN agitation  haloperidol    Injectable 5 milliGRAM(s) IntraMuscular once PRN severe agitation  haloperidol    Injectable 5 milliGRAM(s) IntraMuscular once PRN Severe Agitation  hydrocortisone 2.5% Rectal Cream 1 Application(s) Rectal two times a day PRN hemorrhoids  LORazepam     Tablet 1 milliGRAM(s) Oral every 6 hours PRN agitation  LORazepam   Injectable 1 milliGRAM(s) IntraMuscular once PRN agitation  LORazepam   Injectable 2 milliGRAM(s) IntraMuscular once PRN Severe agitation

## 2024-01-21 NOTE — BH INPATIENT PSYCHIATRY PROGRESS NOTE - MSE UNSTRUCTURED FT
Pt appears stated age, awake,  alert and awake. Visible, stable gait noted. Uncooperative, guarded, poor eye contact. easily distracted. Speech hard to understand due to poor articulation and edentulous. Mood 'busy', affect labile appearing elevated or irritable/angry. No suicidal/homicidal ideas/plan expressed. Thinking disjointed. Cannot assess cognition. Poor insight and judgement, is impulsive.

## 2024-01-21 NOTE — BH INPATIENT PSYCHIATRY PROGRESS NOTE - NSBHCHARTREVIEWVS_PSY_A_CORE FT
Vital Signs Last 24 Hrs  T(C): --  T(F): --  HR: --  BP: --  BP(mean): --  RR: --  SpO2: --    Orthostatic VS  01-21-24 @ 08:44  Lying BP: --/-- HR: --  Sitting BP: 125/64 HR: 76  Standing BP: 122/69 HR: 84  Site: upper right arm  Mode: electronic

## 2024-01-21 NOTE — BH INPATIENT PSYCHIATRY PROGRESS NOTE - NSBHASSESSSUMMFT_PSY_ALL_CORE
- would consider Parkinson's Dementia as a diagnosis in this case more then Vascular Dementia  - consider switching out Seroquel to Zyprexa as Pt's symptoms and behaviors showed response to it when used PRN  - unclear what etiology the charted "seizure" history is   - UNCLEAR why patient is still on Valacyclovir 1000mg PO bid (was on it on admission as well) - medical notes do not mention this   1/18 Patient agitated labile aggressive. Spoke with NP Pal from facility. Patient was on Seroquel and haldol but haldol tapered then dced 10/23 due to EPS which may have eventually led to decompensation. She feels these epsiodes of illness are manic like c/w schizoaffective dx.  Patient compliant in facility most of time. Diet there was mild thickened fluid and soft food. Suspect patient has Parkinsonism not PD given tremor occurring on 10mg/d haldol. Seroquel not effective as monotherapy. Will change to olanzapine consider some reduction in Sinemet, will increase Depakote and change to Sprinkles and target blood level over 50 especially since she has sz disorder. Consider attempt to at least reduce klonopin  1/19 Patient agitated, psychotic, suspect manic component. Will increase po olanzapine but given poor response to IM olanzapine and lability to leverage synergistic use of BDZ will make haldol /Ativan the prn hopefully if olanzapine effective will see reduced use of haldol prn. Patient may if not responding to olanzpine go back to haldol which worked but had EPS issues. Will plan recheck VPA  level next week possibly increase Depakote. Ekg ordered by resident but refused. Given baseline Qtc of 428 feel safe to defer EKG til patient calmer  1/20: Agitated overnight, dancing, slamming newspaper on the floor and turning on lights on sleeping peer's rooms. Medicated with PRN Haldol 5mg/Ativan 1mg around 4:20AM. Irritable, confused and demanding during encounter, unable to engage meaningfully. Refused EKG yesterday. Ordered bloodwork for tomorrow in case pt is more emenable. Refusing vital signs.  1/21: remains impulsive, agitated requiring PRN meds, uncooperative, loud and easily agitated, refused labs but allowed vitals, noted stable.

## 2024-01-22 LAB
GLUCOSE BLDC GLUCOMTR-MCNC: 216 MG/DL — HIGH (ref 70–99)
GLUCOSE BLDC GLUCOMTR-MCNC: 254 MG/DL — HIGH (ref 70–99)

## 2024-01-22 PROCEDURE — 99232 SBSQ HOSP IP/OBS MODERATE 35: CPT

## 2024-01-22 RX ORDER — DIVALPROEX SODIUM 500 MG/1
500 TABLET, DELAYED RELEASE ORAL THREE TIMES A DAY
Refills: 0 | Status: DISCONTINUED | OUTPATIENT
Start: 2024-01-22 | End: 2024-01-22

## 2024-01-22 RX ORDER — HALOPERIDOL DECANOATE 100 MG/ML
5 INJECTION INTRAMUSCULAR ONCE
Refills: 0 | Status: COMPLETED | OUTPATIENT
Start: 2024-01-22 | End: 2024-01-22

## 2024-01-22 RX ORDER — DIVALPROEX SODIUM 500 MG/1
500 TABLET, DELAYED RELEASE ORAL
Refills: 0 | Status: DISCONTINUED | OUTPATIENT
Start: 2024-01-22 | End: 2024-02-29

## 2024-01-22 RX ADMIN — Medication 25 MILLIGRAM(S): at 13:29

## 2024-01-22 RX ADMIN — Medication 2 MILLIGRAM(S): at 14:40

## 2024-01-22 RX ADMIN — VALACYCLOVIR 1000 MILLIGRAM(S): 500 TABLET, FILM COATED ORAL at 13:28

## 2024-01-22 RX ADMIN — MUPIROCIN 1 APPLICATION(S): 20 OINTMENT TOPICAL at 13:28

## 2024-01-22 RX ADMIN — Medication 75 MICROGRAM(S): at 08:55

## 2024-01-22 RX ADMIN — OLANZAPINE 5 MILLIGRAM(S): 15 TABLET, FILM COATED ORAL at 17:48

## 2024-01-22 RX ADMIN — DIVALPROEX SODIUM 500 MILLIGRAM(S): 500 TABLET, DELAYED RELEASE ORAL at 08:55

## 2024-01-22 RX ADMIN — OLANZAPINE 5 MILLIGRAM(S): 15 TABLET, FILM COATED ORAL at 08:56

## 2024-01-22 RX ADMIN — CARBIDOPA AND LEVODOPA 1 TABLET(S): 25; 100 TABLET ORAL at 13:30

## 2024-01-22 RX ADMIN — CARBIDOPA AND LEVODOPA 1 TABLET(S): 25; 100 TABLET ORAL at 08:56

## 2024-01-22 RX ADMIN — Medication 0.5 MILLIGRAM(S): at 08:55

## 2024-01-22 RX ADMIN — DIVALPROEX SODIUM 500 MILLIGRAM(S): 500 TABLET, DELAYED RELEASE ORAL at 13:29

## 2024-01-22 RX ADMIN — Medication 0.5 MILLIGRAM(S): at 17:46

## 2024-01-22 RX ADMIN — OLANZAPINE 5 MILLIGRAM(S): 15 TABLET, FILM COATED ORAL at 13:29

## 2024-01-22 RX ADMIN — HALOPERIDOL DECANOATE 5 MILLIGRAM(S): 100 INJECTION INTRAMUSCULAR at 14:40

## 2024-01-22 RX ADMIN — DIVALPROEX SODIUM 500 MILLIGRAM(S): 500 TABLET, DELAYED RELEASE ORAL at 17:45

## 2024-01-22 RX ADMIN — LISINOPRIL 20 MILLIGRAM(S): 2.5 TABLET ORAL at 13:29

## 2024-01-22 NOTE — BH INPATIENT PSYCHIATRY PROGRESS NOTE - MSE UNSTRUCTURED FT
Pt appears stated age, awake,  alert and awake. Visible, stable gait noted. Uncooperative, guarded, poor eye contact. easily distracted. Speech hard to understand due to poor articulation and edentulous.Has tremor R more than L but no eivdence of rigity, walks well, no shuffling or festination.  Mood elevated affect labile appearing elevated or irritable/angry. No suicidal/homicidal ideas/plan expressed. Makes some references to [employer] [place] Thinking disjointed. Cannot assess cognition. Poor insight and judgement, is impulsive.

## 2024-01-22 NOTE — BH INPATIENT PSYCHIATRY PROGRESS NOTE - NSBHASSESSSUMMFT_PSY_ALL_CORE
- would consider Parkinson's Dementia as a diagnosis in this case more then Vascular Dementia  - consider switching out Seroquel to Zyprexa as Pt's symptoms and behaviors showed response to it when used PRN  - unclear what etiology the charted "seizure" history is   - UNCLEAR why patient is still on Valacyclovir 1000mg PO bid (was on it on admission as well) - medical notes do not mention this   1/18 Patient agitated labile aggressive. Spoke with NP Pal from facility. Patient was on Seroquel and haldol but haldol tapered then dced 10/23 due to EPS which may have eventually led to decompensation. She feels these epsiodes of illness are manic like c/w schizoaffective dx.  Patient compliant in facility most of time. Diet there was mild thickened fluid and soft food. Suspect patient has Parkinsonism not PD given tremor occurring on 10mg/d haldol. Seroquel not effective as monotherapy. Will change to olanzapine consider some reduction in Sinemet, will increase Depakote and change to Sprinkles and target blood level over 50 especially since she has sz disorder. Consider attempt to at least reduce klonopin  1/19 Patient agitated, psychotic, suspect manic component. Will increase po olanzapine but given poor response to IM olanzapine and lability to leverage synergistic use of BDZ will make haldol /Ativan the prn hopefully if olanzapine effective will see reduced use of haldol prn. Patient may if not responding to olanzpine go back to haldol which worked but had EPS issues. Will plan recheck VPA  level next week possibly increase Depakote. Ekg ordered by resident but refused. Given baseline Qtc of 428 feel safe to defer EKG til patient calmer  1/20: Agitated overnight, dancing, slamming newspaper on the floor and turning on lights on sleeping peer's rooms. Medicated with PRN Haldol 5mg/Ativan 1mg around 4:20AM. Irritable, confused and demanding during encounter, unable to engage meaningfully. Refused EKG yesterday. Ordered bloodwork for tomorrow in case pt is more emenable. Refusing vital signs.  1/21: remains impulsive, agitated requiring PRN meds, uncooperative, loud and easily agitated, refused labs but allowed vitals, noted stable.  1/22 Schizoaffective manic with hyper religiosity some sexual pre-occupation but not acting on it, increased motor activity. Will increase olanzapine and Depakote Will try to simplify regimen to maximize compliance. Will try to phase out haldol/Ativan when more stable. LAbs can be repeated when calmer, more cooperative.

## 2024-01-22 NOTE — BH INPATIENT PSYCHIATRY PROGRESS NOTE - CURRENT MEDICATION
MEDICATIONS  (STANDING):  ammonium lactate 12% Lotion 1 Application(s) Topical two times a day  carbidopa/levodopa  25/100 1 Tablet(s) Oral three times a day  clonazePAM Oral Disintegrating Tablet 0.5 milliGRAM(s) Oral <User Schedule>  dextrose 5%. 1000 milliLiter(s) (50 mL/Hr) IV Continuous <Continuous>  dextrose 5%. 1000 milliLiter(s) (100 mL/Hr) IV Continuous <Continuous>  dextrose 50% Injectable 25 Gram(s) IV Push once  dextrose 50% Injectable 12.5 Gram(s) IV Push once  dextrose 50% Injectable 25 Gram(s) IV Push once  divalproex Sprinkle 500 milliGRAM(s) Oral <User Schedule>  glucagon  Injectable 1 milliGRAM(s) IntraMuscular once  hemorrhoidal Ointment 1 Application(s) Rectal daily  insulin lispro (ADMELOG) corrective regimen sliding scale   SubCutaneous three times a day before meals  levothyroxine 75 MICROGram(s) Oral daily  lisinopril 20 milliGRAM(s) Oral daily  metoprolol tartrate 25 milliGRAM(s) Oral two times a day  mupirocin 2% Ointment 1 Application(s) Topical two times a day  OLANZapine Disintegrating Tablet 5 milliGRAM(s) Oral <User Schedule>  polyethylene glycol 3350 17 Gram(s) Oral daily  senna 2 Tablet(s) Oral at bedtime  valACYclovir 1000 milliGRAM(s) Oral every 12 hours    MEDICATIONS  (PRN):  albuterol    90 MICROgram(s) HFA Inhaler 2 Puff(s) Inhalation every 6 hours PRN Shortness of Breath and/or Wheezing  dextrose Oral Gel 15 Gram(s) Oral once PRN Blood Glucose LESS THAN 70 milliGRAM(s)/deciliter  haloperidol     Tablet 5 milliGRAM(s) Oral every 6 hours PRN agitation  haloperidol    Injectable 5 milliGRAM(s) IntraMuscular once PRN Severe Agitation  haloperidol    Injectable 5 milliGRAM(s) IntraMuscular once PRN severe agitation  hydrocortisone 2.5% Rectal Cream 1 Application(s) Rectal two times a day PRN hemorrhoids  LORazepam     Tablet 1 milliGRAM(s) Oral every 6 hours PRN agitation  LORazepam   Injectable 1 milliGRAM(s) IntraMuscular once PRN agitation  LORazepam   Injectable 2 milliGRAM(s) IntraMuscular once PRN Severe agitation

## 2024-01-22 NOTE — BH INPATIENT PSYCHIATRY PROGRESS NOTE - NSBHFUPINTERVALHXFT_PSY_A_CORE
Patient seen for schizoaffective disorder, chart reviewed. Patient sleeping poorly remains intrusive, trying to go into other's rooms,  dancing on unit, agitated to point of needing IM of haldol and Ativan up to twice a day though yesterday needed only one. Refused AM BP staff trying to obtain BP and pulse this PM, refused labs for VPA level

## 2024-01-22 NOTE — BH INPATIENT PSYCHIATRY PROGRESS NOTE - NSBHMETABOLIC_PSY_ALL_CORE_FT
BMI: BMI (kg/m2): 30.2 (01-15-24 @ 12:52)  HbA1c: A1C with Estimated Average Glucose Result: 7.6 % (01-12-24 @ 12:40)    Glucose: POCT Blood Glucose.: 216 mg/dL (01-22-24 @ 12:17)    BP: --Vital Signs Last 24 Hrs  T(C): --  T(F): --  HR: --  BP: --  BP(mean): --  RR: --  SpO2: --    Orthostatic VS  01-21-24 @ 08:44  Lying BP: --/-- HR: --  Sitting BP: 125/64 HR: 76  Standing BP: 122/69 HR: 84  Site: upper right arm  Mode: electronic    Lipid Panel:

## 2024-01-23 LAB
GLUCOSE BLDC GLUCOMTR-MCNC: 135 MG/DL — HIGH (ref 70–99)
GLUCOSE BLDC GLUCOMTR-MCNC: 178 MG/DL — HIGH (ref 70–99)

## 2024-01-23 PROCEDURE — 99232 SBSQ HOSP IP/OBS MODERATE 35: CPT

## 2024-01-23 RX ORDER — HALOPERIDOL DECANOATE 100 MG/ML
5 INJECTION INTRAMUSCULAR ONCE
Refills: 0 | Status: COMPLETED | OUTPATIENT
Start: 2024-01-23 | End: 2024-01-23

## 2024-01-23 RX ORDER — HALOPERIDOL DECANOATE 100 MG/ML
5 INJECTION INTRAMUSCULAR
Refills: 0 | Status: DISCONTINUED | OUTPATIENT
Start: 2024-01-23 | End: 2024-02-23

## 2024-01-23 RX ADMIN — Medication 75 MICROGRAM(S): at 06:24

## 2024-01-23 RX ADMIN — Medication 0.5 MILLIGRAM(S): at 17:10

## 2024-01-23 RX ADMIN — OLANZAPINE 5 MILLIGRAM(S): 15 TABLET, FILM COATED ORAL at 12:52

## 2024-01-23 RX ADMIN — DIVALPROEX SODIUM 500 MILLIGRAM(S): 500 TABLET, DELAYED RELEASE ORAL at 17:10

## 2024-01-23 RX ADMIN — HALOPERIDOL DECANOATE 5 MILLIGRAM(S): 100 INJECTION INTRAMUSCULAR at 07:49

## 2024-01-23 RX ADMIN — VALACYCLOVIR 1000 MILLIGRAM(S): 500 TABLET, FILM COATED ORAL at 21:41

## 2024-01-23 RX ADMIN — OLANZAPINE 5 MILLIGRAM(S): 15 TABLET, FILM COATED ORAL at 10:07

## 2024-01-23 RX ADMIN — SENNA PLUS 2 TABLET(S): 8.6 TABLET ORAL at 21:42

## 2024-01-23 RX ADMIN — LISINOPRIL 20 MILLIGRAM(S): 2.5 TABLET ORAL at 10:06

## 2024-01-23 RX ADMIN — CARBIDOPA AND LEVODOPA 1 TABLET(S): 25; 100 TABLET ORAL at 12:51

## 2024-01-23 RX ADMIN — DIVALPROEX SODIUM 500 MILLIGRAM(S): 500 TABLET, DELAYED RELEASE ORAL at 12:52

## 2024-01-23 RX ADMIN — Medication 0.5 MILLIGRAM(S): at 10:06

## 2024-01-23 RX ADMIN — CARBIDOPA AND LEVODOPA 1 TABLET(S): 25; 100 TABLET ORAL at 10:06

## 2024-01-23 RX ADMIN — HALOPERIDOL DECANOATE 5 MILLIGRAM(S): 100 INJECTION INTRAMUSCULAR at 17:10

## 2024-01-23 RX ADMIN — Medication 25 MILLIGRAM(S): at 10:06

## 2024-01-23 RX ADMIN — Medication 1: at 12:25

## 2024-01-23 RX ADMIN — Medication 25 MILLIGRAM(S): at 21:42

## 2024-01-23 RX ADMIN — Medication 1 MILLIGRAM(S): at 21:41

## 2024-01-23 RX ADMIN — Medication 2 MILLIGRAM(S): at 07:49

## 2024-01-23 RX ADMIN — DIVALPROEX SODIUM 500 MILLIGRAM(S): 500 TABLET, DELAYED RELEASE ORAL at 10:06

## 2024-01-23 RX ADMIN — VALACYCLOVIR 1000 MILLIGRAM(S): 500 TABLET, FILM COATED ORAL at 10:33

## 2024-01-23 RX ADMIN — CARBIDOPA AND LEVODOPA 1 TABLET(S): 25; 100 TABLET ORAL at 21:41

## 2024-01-23 NOTE — BH INPATIENT PSYCHIATRY PROGRESS NOTE - NSBHCHARTREVIEWVS_PSY_A_CORE FT
Vital Signs Last 24 Hrs  T(C): --  T(F): --  HR: 98 (01-22-24 @ 21:00) (98 - 98)  BP: 128/59 (01-22-24 @ 21:00) (113/66 - 128/59)  BP(mean): --  RR: 18 (01-22-24 @ 21:00) (18 - 18)  SpO2: 98% (01-22-24 @ 21:00) (98% - 98%)

## 2024-01-23 NOTE — BH INPATIENT PSYCHIATRY PROGRESS NOTE - PRN MEDS
MEDICATIONS  (PRN):  albuterol    90 MICROgram(s) HFA Inhaler 2 Puff(s) Inhalation every 6 hours PRN Shortness of Breath and/or Wheezing  dextrose Oral Gel 15 Gram(s) Oral once PRN Blood Glucose LESS THAN 70 milliGRAM(s)/deciliter  haloperidol     Tablet 5 milliGRAM(s) Oral every 6 hours PRN agitation  haloperidol    Injectable 5 milliGRAM(s) IntraMuscular once PRN Severe Agitation  haloperidol    Injectable 5 milliGRAM(s) IntraMuscular once PRN severe agitation  hydrocortisone 2.5% Rectal Cream 1 Application(s) Rectal two times a day PRN hemorrhoids  LORazepam     Tablet 1 milliGRAM(s) Oral every 6 hours PRN agitation  LORazepam   Injectable 2 milliGRAM(s) IntraMuscular once PRN Severe agitation

## 2024-01-23 NOTE — BH INPATIENT PSYCHIATRY PROGRESS NOTE - MSE UNSTRUCTURED FT
Pt appears stated age, awake,  alert and awake. Visible, stable gait noted. Uncooperative, guarded, poor eye contact. easily distracted. Speech hard to understand due to poor articulation and edentulous.Has tremor R more than L but no eivdence of rigity, walks well, no shuffling or festination.  Mood elevated affect labile appearing elevated or irritable/angry. No suicidal/homicidal ideas/plan expressed. Makes some references to [employer] [place] [place], makes inappropriate sexual comments, uses coprolalia Thinking disjointed. Cannot assess cognition. Poor insight and judgement, is impulsive.

## 2024-01-23 NOTE — BH INPATIENT PSYCHIATRY PROGRESS NOTE - NSBHASSESSSUMMFT_PSY_ALL_CORE
- would consider Parkinson's Dementia as a diagnosis in this case more then Vascular Dementia  - consider switching out Seroquel to Zyprexa as Pt's symptoms and behaviors showed response to it when used PRN  - unclear what etiology the charted "seizure" history is   - UNCLEAR why patient is still on Valacyclovir 1000mg PO bid (was on it on admission as well) - medical notes do not mention this   1/18 Patient agitated labile aggressive. Spoke with NP Pal from facility. Patient was on Seroquel and haldol but haldol tapered then dced 10/23 due to EPS which may have eventually led to decompensation. She feels these epsiodes of illness are manic like c/w schizoaffective dx.  Patient compliant in facility most of time. Diet there was mild thickened fluid and soft food. Suspect patient has Parkinsonism not PD given tremor occurring on 10mg/d haldol. Seroquel not effective as monotherapy. Will change to olanzapine consider some reduction in Sinemet, will increase Depakote and change to Sprinkles and target blood level over 50 especially since she has sz disorder. Consider attempt to at least reduce klonopin  1/19 Patient agitated, psychotic, suspect manic component. Will increase po olanzapine but given poor response to IM olanzapine and lability to leverage synergistic use of BDZ will make haldol /Ativan the prn hopefully if olanzapine effective will see reduced use of haldol prn. Patient may if not responding to olanzpine go back to haldol which worked but had EPS issues. Will plan recheck VPA  level next week possibly increase Depakote. Ekg ordered by resident but refused. Given baseline Qtc of 428 feel safe to defer EKG til patient calmer  1/20: Agitated overnight, dancing, slamming newspaper on the floor and turning on lights on sleeping peer's rooms. Medicated with PRN Haldol 5mg/Ativan 1mg around 4:20AM. Irritable, confused and demanding during encounter, unable to engage meaningfully. Refused EKG yesterday. Ordered bloodwork for tomorrow in case pt is more emenable. Refusing vital signs.  1/21: remains impulsive, agitated requiring PRN meds, uncooperative, loud and easily agitated, refused labs but allowed vitals, noted stable.  1/22 Schizoaffective manic with hyper religiosity some sexual pre-occupation but not acting on it, increased motor activity. Will increase olanzapine and Depakote Will try to simplify regimen to maximize compliance. Will try to phase out haldol/Ativan when more stable. LAbs can be repeated when calmer, more cooperative.  1/23 Patient with psychosis, shabbir, remains agitated needing prns. Given need for frequent IMs of haldol and Ativan poor reposnse to po and IM Zyprexa, hx of good response to haldol, unlikliness she has true Parkinsons disease will dc olanzapine and add haldol 5mg bid allowing for monotherapy treatment with med she has reponded to in past COnt klonopin and Depakote

## 2024-01-23 NOTE — BH INPATIENT PSYCHIATRY PROGRESS NOTE - NSTXCONFGOALOTHER_PSY_ALL_CORE
Patient with moderate prompting will participate in two rehab groups daily within seven days.

## 2024-01-23 NOTE — BH INPATIENT PSYCHIATRY PROGRESS NOTE - NSBHMETABOLIC_PSY_ALL_CORE_FT
BMI: BMI (kg/m2): 30.2 (01-15-24 @ 12:52)  HbA1c: A1C with Estimated Average Glucose Result: 7.6 % (01-12-24 @ 12:40)    Glucose: POCT Blood Glucose.: 178 mg/dL (01-23-24 @ 11:56)    BP: 128/59 (01-22-24 @ 21:00) (113/66 - 128/59)Vital Signs Last 24 Hrs  T(C): --  T(F): --  HR: 98 (01-22-24 @ 21:00) (98 - 98)  BP: 128/59 (01-22-24 @ 21:00) (113/66 - 128/59)  BP(mean): --  RR: 18 (01-22-24 @ 21:00) (18 - 18)  SpO2: 98% (01-22-24 @ 21:00) (98% - 98%)      Lipid Panel:

## 2024-01-23 NOTE — BH INPATIENT PSYCHIATRY PROGRESS NOTE - CURRENT MEDICATION
MEDICATIONS  (STANDING):  ammonium lactate 12% Lotion 1 Application(s) Topical two times a day  carbidopa/levodopa  25/100 1 Tablet(s) Oral three times a day  clonazePAM Oral Disintegrating Tablet 0.5 milliGRAM(s) Oral <User Schedule>  dextrose 5%. 1000 milliLiter(s) (50 mL/Hr) IV Continuous <Continuous>  dextrose 5%. 1000 milliLiter(s) (100 mL/Hr) IV Continuous <Continuous>  dextrose 50% Injectable 25 Gram(s) IV Push once  dextrose 50% Injectable 25 Gram(s) IV Push once  dextrose 50% Injectable 12.5 Gram(s) IV Push once  divalproex Sprinkle 500 milliGRAM(s) Oral <User Schedule>  glucagon  Injectable 1 milliGRAM(s) IntraMuscular once  haloperidol     Tablet 5 milliGRAM(s) Oral <User Schedule>  hemorrhoidal Ointment 1 Application(s) Rectal daily  insulin lispro (ADMELOG) corrective regimen sliding scale   SubCutaneous three times a day before meals  levothyroxine 75 MICROGram(s) Oral daily  lisinopril 20 milliGRAM(s) Oral daily  metoprolol tartrate 25 milliGRAM(s) Oral two times a day  polyethylene glycol 3350 17 Gram(s) Oral daily  senna 2 Tablet(s) Oral at bedtime  valACYclovir 1000 milliGRAM(s) Oral every 12 hours    MEDICATIONS  (PRN):  albuterol    90 MICROgram(s) HFA Inhaler 2 Puff(s) Inhalation every 6 hours PRN Shortness of Breath and/or Wheezing  dextrose Oral Gel 15 Gram(s) Oral once PRN Blood Glucose LESS THAN 70 milliGRAM(s)/deciliter  haloperidol     Tablet 5 milliGRAM(s) Oral every 6 hours PRN agitation  haloperidol    Injectable 5 milliGRAM(s) IntraMuscular once PRN Severe Agitation  haloperidol    Injectable 5 milliGRAM(s) IntraMuscular once PRN severe agitation  hydrocortisone 2.5% Rectal Cream 1 Application(s) Rectal two times a day PRN hemorrhoids  LORazepam     Tablet 1 milliGRAM(s) Oral every 6 hours PRN agitation  LORazepam   Injectable 2 milliGRAM(s) IntraMuscular once PRN Severe agitation

## 2024-01-23 NOTE — BH INPATIENT PSYCHIATRY PROGRESS NOTE - NSBHFUPINTERVALHXFT_PSY_A_CORE
Patient seen for schizoaffective disorder, chart reviewed. Patient sleeping poorly remains intrusive, trying to go into other's rooms,  dancing on unit, agitated to point of needing IM of haldol and Ativan up to twice a day. VSS Patient sexually pre-occupied asking staff to engage in sexual acts

## 2024-01-24 PROCEDURE — 99232 SBSQ HOSP IP/OBS MODERATE 35: CPT

## 2024-01-24 RX ORDER — HALOPERIDOL DECANOATE 100 MG/ML
5 INJECTION INTRAMUSCULAR ONCE
Refills: 0 | Status: DISCONTINUED | OUTPATIENT
Start: 2024-01-24 | End: 2024-01-24

## 2024-01-24 RX ORDER — HALOPERIDOL DECANOATE 100 MG/ML
5 INJECTION INTRAMUSCULAR ONCE
Refills: 0 | Status: COMPLETED | OUTPATIENT
Start: 2024-01-24 | End: 2024-01-24

## 2024-01-24 RX ADMIN — CARBIDOPA AND LEVODOPA 1 TABLET(S): 25; 100 TABLET ORAL at 15:25

## 2024-01-24 RX ADMIN — HALOPERIDOL DECANOATE 5 MILLIGRAM(S): 100 INJECTION INTRAMUSCULAR at 12:11

## 2024-01-24 RX ADMIN — DIVALPROEX SODIUM 500 MILLIGRAM(S): 500 TABLET, DELAYED RELEASE ORAL at 09:29

## 2024-01-24 RX ADMIN — DIVALPROEX SODIUM 500 MILLIGRAM(S): 500 TABLET, DELAYED RELEASE ORAL at 17:03

## 2024-01-24 RX ADMIN — Medication 0.5 MILLIGRAM(S): at 17:03

## 2024-01-24 RX ADMIN — CARBIDOPA AND LEVODOPA 1 TABLET(S): 25; 100 TABLET ORAL at 09:28

## 2024-01-24 RX ADMIN — HALOPERIDOL DECANOATE 5 MILLIGRAM(S): 100 INJECTION INTRAMUSCULAR at 01:26

## 2024-01-24 RX ADMIN — Medication 0.5 MILLIGRAM(S): at 09:29

## 2024-01-24 RX ADMIN — SENNA PLUS 2 TABLET(S): 8.6 TABLET ORAL at 21:25

## 2024-01-24 RX ADMIN — HALOPERIDOL DECANOATE 5 MILLIGRAM(S): 100 INJECTION INTRAMUSCULAR at 17:03

## 2024-01-24 RX ADMIN — Medication 1 TABLET(S): at 15:25

## 2024-01-24 RX ADMIN — HALOPERIDOL DECANOATE 5 MILLIGRAM(S): 100 INJECTION INTRAMUSCULAR at 09:29

## 2024-01-24 RX ADMIN — Medication 2 MILLIGRAM(S): at 12:11

## 2024-01-24 RX ADMIN — POLYETHYLENE GLYCOL 3350 17 GRAM(S): 17 POWDER, FOR SOLUTION ORAL at 09:29

## 2024-01-24 RX ADMIN — VALACYCLOVIR 1000 MILLIGRAM(S): 500 TABLET, FILM COATED ORAL at 09:28

## 2024-01-24 RX ADMIN — VALACYCLOVIR 1000 MILLIGRAM(S): 500 TABLET, FILM COATED ORAL at 21:41

## 2024-01-24 RX ADMIN — Medication 1 TABLET(S): at 21:25

## 2024-01-24 RX ADMIN — CARBIDOPA AND LEVODOPA 1 TABLET(S): 25; 100 TABLET ORAL at 21:25

## 2024-01-24 NOTE — BH INPATIENT PSYCHIATRY PROGRESS NOTE - MSE UNSTRUCTURED FT
Pt appears stated age, awake,  alert and awake. Visible, stable gait noted. Sl more engageable today still easily distracted. Speech hard to understand due to poor articulation and edentulous.Has tremor R more than L but no evidence of rigidity, walks well, no shuffling or festination.  Mood elevated affect labile appearing elevated or irritable/angry. No suicidal/homicidal ideas/plan expressed. Makes some references to [employer] [place], makes inappropriate sexual comments, uses coprolalia says she is a [employer], wants a vitamin Thinking disjointed. Cannot assess cognition. Poor insight and judgement, is impulsive.

## 2024-01-24 NOTE — BH INPATIENT PSYCHIATRY PROGRESS NOTE - NSBHASSESSSUMMFT_PSY_ALL_CORE
- would consider Parkinson's Dementia as a diagnosis in this case more then Vascular Dementia  - consider switching out Seroquel to Zyprexa as Pt's symptoms and behaviors showed response to it when used PRN  - unclear what etiology the charted "seizure" history is   - UNCLEAR why patient is still on Valacyclovir 1000mg PO bid (was on it on admission as well) - medical notes do not mention this   1/18 Patient agitated labile aggressive. Spoke with NP Pal from facility. Patient was on Seroquel and haldol but haldol tapered then dced 10/23 due to EPS which may have eventually led to decompensation. She feels these epsiodes of illness are manic like c/w schizoaffective dx.  Patient compliant in facility most of time. Diet there was mild thickened fluid and soft food. Suspect patient has Parkinsonism not PD given tremor occurring on 10mg/d haldol. Seroquel not effective as monotherapy. Will change to olanzapine consider some reduction in Sinemet, will increase Depakote and change to Sprinkles and target blood level over 50 especially since she has sz disorder. Consider attempt to at least reduce klonopin  1/19 Patient agitated, psychotic, suspect manic component. Will increase po olanzapine but given poor response to IM olanzapine and lability to leverage synergistic use of BDZ will make haldol /Ativan the prn hopefully if olanzapine effective will see reduced use of haldol prn. Patient may if not responding to olanzpine go back to haldol which worked but had EPS issues. Will plan recheck VPA  level next week possibly increase Depakote. Ekg ordered by resident but refused. Given baseline Qtc of 428 feel safe to defer EKG til patient calmer  1/20: Agitated overnight, dancing, slamming newspaper on the floor and turning on lights on sleeping peer's rooms. Medicated with PRN Haldol 5mg/Ativan 1mg around 4:20AM. Irritable, confused and demanding during encounter, unable to engage meaningfully. Refused EKG yesterday. Ordered bloodwork for tomorrow in case pt is more emenable. Refusing vital signs.  1/21: remains impulsive, agitated requiring PRN meds, uncooperative, loud and easily agitated, refused labs but allowed vitals, noted stable.  1/22 Schizoaffective manic with hyper religiosity some sexual pre-occupation but not acting on it, increased motor activity. Will increase olanzapine and Depakote Will try to simplify regimen to maximize compliance. Will try to phase out haldol/Ativan when more stable. LAbs can be repeated when calmer, more cooperative.  1/23 Patient with psychosis, shabbir, remains agitated needing prns. Given need for frequent IMs of haldol and Ativan poor reposnse to po and IM Zyprexa, hx of good response to haldol, unlikliness she has true Parkinsons disease will dc olanzapine and add haldol 5mg bid allowing for monotherapy treatment with med she has responded to in past Cont klonopin and Depakote   1/24 Remains manic perhaps  slightly better than admit despite receiving IMs freuently. COnt haldol and depakote and prns. Will add MVI at patient request

## 2024-01-24 NOTE — BH INPATIENT PSYCHIATRY PROGRESS NOTE - NSBHFUPINTERVALHXFT_PSY_A_CORE
Patient seen for schizoaffective disorder, chart reviewed. Patient sleeping poorly remains intrusive, trying to go into other's rooms,  dancing on unit, agitated to point of needing IM of haldol and Ativan up to twice a day.Refused vitals Patient sexually pre-occupied asking staff to engage in sexual acts

## 2024-01-24 NOTE — BH INPATIENT PSYCHIATRY PROGRESS NOTE - NSBHMETABOLIC_PSY_ALL_CORE_FT
BMI: BMI (kg/m2): 30.2 (01-15-24 @ 12:52)  HbA1c: A1C with Estimated Average Glucose Result: 7.6 % (01-12-24 @ 12:40)    Glucose: POCT Blood Glucose.: 135 mg/dL (01-23-24 @ 16:34)    BP: 128/59 (01-22-24 @ 21:00) (113/66 - 128/59)Vital Signs Last 24 Hrs  T(C): --  T(F): --  HR: --  BP: --  BP(mean): --  RR: --  SpO2: --      Lipid Panel:

## 2024-01-24 NOTE — BH INPATIENT PSYCHIATRY PROGRESS NOTE - CURRENT MEDICATION
MEDICATIONS  (STANDING):  ammonium lactate 12% Lotion 1 Application(s) Topical two times a day  carbidopa/levodopa  25/100 1 Tablet(s) Oral three times a day  clonazePAM Oral Disintegrating Tablet 0.5 milliGRAM(s) Oral <User Schedule>  dextrose 5%. 1000 milliLiter(s) (50 mL/Hr) IV Continuous <Continuous>  dextrose 5%. 1000 milliLiter(s) (100 mL/Hr) IV Continuous <Continuous>  dextrose 50% Injectable 25 Gram(s) IV Push once  dextrose 50% Injectable 25 Gram(s) IV Push once  dextrose 50% Injectable 12.5 Gram(s) IV Push once  divalproex Sprinkle 500 milliGRAM(s) Oral <User Schedule>  glucagon  Injectable 1 milliGRAM(s) IntraMuscular once  haloperidol     Tablet 5 milliGRAM(s) Oral <User Schedule>  hemorrhoidal Ointment 1 Application(s) Rectal daily  insulin lispro (ADMELOG) corrective regimen sliding scale   SubCutaneous three times a day before meals  levothyroxine 75 MICROGram(s) Oral daily  lisinopril 20 milliGRAM(s) Oral daily  metoprolol tartrate 25 milliGRAM(s) Oral two times a day  multivitamin 1 Tablet(s) Oral at bedtime  multivitamin 1 Tablet(s) Oral once  polyethylene glycol 3350 17 Gram(s) Oral daily  senna 2 Tablet(s) Oral at bedtime  valACYclovir 1000 milliGRAM(s) Oral every 12 hours    MEDICATIONS  (PRN):  albuterol    90 MICROgram(s) HFA Inhaler 2 Puff(s) Inhalation every 6 hours PRN Shortness of Breath and/or Wheezing  dextrose Oral Gel 15 Gram(s) Oral once PRN Blood Glucose LESS THAN 70 milliGRAM(s)/deciliter  haloperidol     Tablet 5 milliGRAM(s) Oral every 6 hours PRN agitation  haloperidol    Injectable 5 milliGRAM(s) IntraMuscular once PRN severe agitation  haloperidol    Injectable 5 milliGRAM(s) IntraMuscular once PRN Severe Agitation  hydrocortisone 2.5% Rectal Cream 1 Application(s) Rectal two times a day PRN hemorrhoids  LORazepam     Tablet 1 milliGRAM(s) Oral every 6 hours PRN agitation  LORazepam   Injectable 2 milliGRAM(s) IntraMuscular once PRN Severe agitation

## 2024-01-25 LAB — GLUCOSE BLDC GLUCOMTR-MCNC: 274 MG/DL — HIGH (ref 70–99)

## 2024-01-25 PROCEDURE — 99232 SBSQ HOSP IP/OBS MODERATE 35: CPT

## 2024-01-25 RX ORDER — HALOPERIDOL DECANOATE 100 MG/ML
5 INJECTION INTRAMUSCULAR ONCE
Refills: 0 | Status: COMPLETED | OUTPATIENT
Start: 2024-01-25 | End: 2024-01-25

## 2024-01-25 RX ORDER — SENNA PLUS 8.6 MG/1
2 TABLET ORAL AT BEDTIME
Refills: 0 | Status: DISCONTINUED | OUTPATIENT
Start: 2024-01-25 | End: 2024-01-25

## 2024-01-25 RX ADMIN — Medication 0.5 MILLIGRAM(S): at 17:24

## 2024-01-25 RX ADMIN — HALOPERIDOL DECANOATE 5 MILLIGRAM(S): 100 INJECTION INTRAMUSCULAR at 07:40

## 2024-01-25 RX ADMIN — LISINOPRIL 20 MILLIGRAM(S): 2.5 TABLET ORAL at 10:03

## 2024-01-25 RX ADMIN — DIVALPROEX SODIUM 500 MILLIGRAM(S): 500 TABLET, DELAYED RELEASE ORAL at 17:24

## 2024-01-25 RX ADMIN — HALOPERIDOL DECANOATE 5 MILLIGRAM(S): 100 INJECTION INTRAMUSCULAR at 10:03

## 2024-01-25 RX ADMIN — VALACYCLOVIR 1000 MILLIGRAM(S): 500 TABLET, FILM COATED ORAL at 10:04

## 2024-01-25 RX ADMIN — Medication 2 MILLIGRAM(S): at 07:40

## 2024-01-25 RX ADMIN — HALOPERIDOL DECANOATE 5 MILLIGRAM(S): 100 INJECTION INTRAMUSCULAR at 17:24

## 2024-01-25 RX ADMIN — Medication 0.5 MILLIGRAM(S): at 10:03

## 2024-01-25 RX ADMIN — DIVALPROEX SODIUM 500 MILLIGRAM(S): 500 TABLET, DELAYED RELEASE ORAL at 10:04

## 2024-01-25 RX ADMIN — Medication 75 MICROGRAM(S): at 06:40

## 2024-01-25 RX ADMIN — POLYETHYLENE GLYCOL 3350 17 GRAM(S): 17 POWDER, FOR SOLUTION ORAL at 10:04

## 2024-01-25 RX ADMIN — Medication 1 APPLICATION(S): at 10:04

## 2024-01-25 RX ADMIN — Medication 25 MILLIGRAM(S): at 10:04

## 2024-01-25 RX ADMIN — CARBIDOPA AND LEVODOPA 1 TABLET(S): 25; 100 TABLET ORAL at 10:05

## 2024-01-25 NOTE — BH INPATIENT PSYCHIATRY PROGRESS NOTE - MSE UNSTRUCTURED FT
Pt appears stated age, awake,  alert and awake. Visible, stable gait noted. Sl more engageable today still easily distracted. Speech hard to understand due to poor articulation and edentulous.Has tremor R more than L but no evidence of rigidity, walks well, no shuffling or festination.  Mood elevated affect labile appearing elevated or irritable/angry. No suicidal/homicidal ideas/plan expressed. Focused on getting certain vegetables Thinking disjointed. Cannot assess cognition. Poor insight and judgement, is impulsive.

## 2024-01-25 NOTE — BH INPATIENT PSYCHIATRY PROGRESS NOTE - NSBHASSESSSUMMFT_PSY_ALL_CORE
- would consider Parkinson's Dementia as a diagnosis in this case more then Vascular Dementia  - consider switching out Seroquel to Zyprexa as Pt's symptoms and behaviors showed response to it when used PRN  - unclear what etiology the charted "seizure" history is   - UNCLEAR why patient is still on Valacyclovir 1000mg PO bid (was on it on admission as well) - medical notes do not mention this   1/18 Patient agitated labile aggressive. Spoke with NP Pal from facility. Patient was on Seroquel and haldol but haldol tapered then dced 10/23 due to EPS which may have eventually led to decompensation. She feels these epsiodes of illness are manic like c/w schizoaffective dx.  Patient compliant in facility most of time. Diet there was mild thickened fluid and soft food. Suspect patient has Parkinsonism not PD given tremor occurring on 10mg/d haldol. Seroquel not effective as monotherapy. Will change to olanzapine consider some reduction in Sinemet, will increase Depakote and change to Sprinkles and target blood level over 50 especially since she has sz disorder. Consider attempt to at least reduce klonopin  1/19 Patient agitated, psychotic, suspect manic component. Will increase po olanzapine but given poor response to IM olanzapine and lability to leverage synergistic use of BDZ will make haldol /Ativan the prn hopefully if olanzapine effective will see reduced use of haldol prn. Patient may if not responding to olanzpine go back to haldol which worked but had EPS issues. Will plan recheck VPA  level next week possibly increase Depakote. Ekg ordered by resident but refused. Given baseline Qtc of 428 feel safe to defer EKG til patient calmer  1/20: Agitated overnight, dancing, slamming newspaper on the floor and turning on lights on sleeping peer's rooms. Medicated with PRN Haldol 5mg/Ativan 1mg around 4:20AM. Irritable, confused and demanding during encounter, unable to engage meaningfully. Refused EKG yesterday. Ordered bloodwork for tomorrow in case pt is more emenable. Refusing vital signs.  1/21: remains impulsive, agitated requiring PRN meds, uncooperative, loud and easily agitated, refused labs but allowed vitals, noted stable.  1/22 Schizoaffective manic with hyper religiosity some sexual pre-occupation but not acting on it, increased motor activity. Will increase olanzapine and Depakote Will try to simplify regimen to maximize compliance. Will try to phase out haldol/Ativan when more stable. LAbs can be repeated when calmer, more cooperative.  1/23 Patient with psychosis, shabbir, remains agitated needing prns. Given need for frequent IMs of haldol and Ativan poor reposnse to po and IM Zyprexa, hx of good response to haldol, unlikliness she has true Parkinsons disease will dc olanzapine and add haldol 5mg bid allowing for monotherapy treatment with med she has responded to in past Cont klonopin and Depakote   1/24 Remains manic perhaps  slightly better than admit despite receiving IMs freuently. COnt haldol and depakote and prns. Will add MVI at patient request  1/25 PAtient manic sl better at this moment after taking AM meds and receiving prn. Will do mouth checks to make sure she is taking her meds, will ask for dietary consult

## 2024-01-25 NOTE — BH INPATIENT PSYCHIATRY PROGRESS NOTE - NSBHFUPINTERVALHXFT_PSY_A_CORE
Patient seen for schizoaffective disorder, chart reviewed. Patient sleeping poorly remains intrusive, trying to go into other's rooms,  dancing on unit, agitated to point of needing IM of haldol and Ativan up to twice a day.VSS. Told staff that god told her to spit out meds

## 2024-01-25 NOTE — BH INPATIENT PSYCHIATRY PROGRESS NOTE - CURRENT MEDICATION
MEDICATIONS  (STANDING):  ammonium lactate 12% Lotion 1 Application(s) Topical two times a day  carbidopa/levodopa  25/100 1 Tablet(s) Oral three times a day  clonazePAM Oral Disintegrating Tablet 0.5 milliGRAM(s) Oral <User Schedule>  dextrose 5%. 1000 milliLiter(s) (50 mL/Hr) IV Continuous <Continuous>  dextrose 5%. 1000 milliLiter(s) (100 mL/Hr) IV Continuous <Continuous>  dextrose 50% Injectable 25 Gram(s) IV Push once  dextrose 50% Injectable 12.5 Gram(s) IV Push once  dextrose 50% Injectable 25 Gram(s) IV Push once  divalproex Sprinkle 500 milliGRAM(s) Oral <User Schedule>  glucagon  Injectable 1 milliGRAM(s) IntraMuscular once  haloperidol     Tablet 5 milliGRAM(s) Oral <User Schedule>  hemorrhoidal Ointment 1 Application(s) Rectal daily  insulin lispro (ADMELOG) corrective regimen sliding scale   SubCutaneous three times a day before meals  levothyroxine 75 MICROGram(s) Oral daily  lisinopril 20 milliGRAM(s) Oral daily  metoprolol tartrate 25 milliGRAM(s) Oral two times a day  multivitamin 1 Tablet(s) Oral at bedtime  polyethylene glycol 3350 17 Gram(s) Oral daily  senna 2 Tablet(s) Oral at bedtime  senna 2 Tablet(s) Oral at bedtime  valACYclovir 1000 milliGRAM(s) Oral every 12 hours    MEDICATIONS  (PRN):  albuterol    90 MICROgram(s) HFA Inhaler 2 Puff(s) Inhalation every 6 hours PRN Shortness of Breath and/or Wheezing  dextrose Oral Gel 15 Gram(s) Oral once PRN Blood Glucose LESS THAN 70 milliGRAM(s)/deciliter  haloperidol     Tablet 5 milliGRAM(s) Oral every 6 hours PRN agitation  haloperidol    Injectable 5 milliGRAM(s) IntraMuscular once PRN severe agitation  haloperidol    Injectable 5 milliGRAM(s) IntraMuscular once PRN Severe Agitation  hydrocortisone 2.5% Rectal Cream 1 Application(s) Rectal two times a day PRN hemorrhoids  LORazepam     Tablet 1 milliGRAM(s) Oral every 6 hours PRN agitation  LORazepam   Injectable 2 milliGRAM(s) IntraMuscular once PRN Severe agitation

## 2024-01-25 NOTE — BH INPATIENT PSYCHIATRY PROGRESS NOTE - NSBHMETABOLIC_PSY_ALL_CORE_FT
BMI: BMI (kg/m2): 30.2 (01-15-24 @ 12:52)  HbA1c: A1C with Estimated Average Glucose Result: 7.6 % (01-12-24 @ 12:40)    Glucose: POCT Blood Glucose.: 135 mg/dL (01-23-24 @ 16:34)    BP: 128/59 (01-22-24 @ 21:00) (113/66 - 128/59)Vital Signs Last 24 Hrs  T(C): 36.7 (01-25-24 @ 09:20), Max: 36.7 (01-25-24 @ 09:20)  T(F): 98 (01-25-24 @ 09:20), Max: 98 (01-25-24 @ 09:20)  HR: --  BP: --  BP(mean): --  RR: --  SpO2: --    Orthostatic VS  01-25-24 @ 09:20  Lying BP: --/-- HR: --  Sitting BP: 150/79 HR: 95  Standing BP: 145/78 HR: 103  Site: --  Mode: --    Lipid Panel:

## 2024-01-25 NOTE — BH INPATIENT PSYCHIATRY PROGRESS NOTE - NSBHCHARTREVIEWVS_PSY_A_CORE FT
Vital Signs Last 24 Hrs  T(C): 36.7 (01-25-24 @ 09:20), Max: 36.7 (01-25-24 @ 09:20)  T(F): 98 (01-25-24 @ 09:20), Max: 98 (01-25-24 @ 09:20)  HR: --  BP: --  BP(mean): --  RR: --  SpO2: --    Orthostatic VS  01-25-24 @ 09:20  Lying BP: --/-- HR: --  Sitting BP: 150/79 HR: 95  Standing BP: 145/78 HR: 103  Site: --  Mode: --

## 2024-01-26 LAB
GLUCOSE BLDC GLUCOMTR-MCNC: 255 MG/DL — HIGH (ref 70–99)
GLUCOSE BLDC GLUCOMTR-MCNC: 304 MG/DL — HIGH (ref 70–99)

## 2024-01-26 PROCEDURE — 99232 SBSQ HOSP IP/OBS MODERATE 35: CPT

## 2024-01-26 RX ORDER — METOPROLOL TARTRATE 50 MG
50 TABLET ORAL DAILY
Refills: 0 | Status: DISCONTINUED | OUTPATIENT
Start: 2024-01-26 | End: 2024-04-28

## 2024-01-26 RX ORDER — CARBIDOPA AND LEVODOPA 25; 100 MG/1; MG/1
1 TABLET ORAL
Refills: 0 | Status: DISCONTINUED | OUTPATIENT
Start: 2024-01-26 | End: 2024-02-22

## 2024-01-26 RX ORDER — CLONAZEPAM 1 MG
0.5 TABLET ORAL
Refills: 0 | Status: DISCONTINUED | OUTPATIENT
Start: 2024-01-26 | End: 2024-02-01

## 2024-01-26 RX ORDER — HALOPERIDOL DECANOATE 100 MG/ML
5 INJECTION INTRAMUSCULAR ONCE
Refills: 0 | Status: COMPLETED | OUTPATIENT
Start: 2024-01-26 | End: 2024-01-26

## 2024-01-26 RX ADMIN — HALOPERIDOL DECANOATE 5 MILLIGRAM(S): 100 INJECTION INTRAMUSCULAR at 02:17

## 2024-01-26 RX ADMIN — CARBIDOPA AND LEVODOPA 1 TABLET(S): 25; 100 TABLET ORAL at 16:53

## 2024-01-26 RX ADMIN — Medication 2 MILLIGRAM(S): at 23:23

## 2024-01-26 RX ADMIN — CARBIDOPA AND LEVODOPA 1 TABLET(S): 25; 100 TABLET ORAL at 09:46

## 2024-01-26 RX ADMIN — DIVALPROEX SODIUM 500 MILLIGRAM(S): 500 TABLET, DELAYED RELEASE ORAL at 09:47

## 2024-01-26 RX ADMIN — HALOPERIDOL DECANOATE 5 MILLIGRAM(S): 100 INJECTION INTRAMUSCULAR at 23:22

## 2024-01-26 RX ADMIN — Medication 3: at 12:43

## 2024-01-26 RX ADMIN — HALOPERIDOL DECANOATE 5 MILLIGRAM(S): 100 INJECTION INTRAMUSCULAR at 09:47

## 2024-01-26 RX ADMIN — Medication 0.5 MILLIGRAM(S): at 09:47

## 2024-01-26 RX ADMIN — LISINOPRIL 20 MILLIGRAM(S): 2.5 TABLET ORAL at 09:47

## 2024-01-26 RX ADMIN — DIVALPROEX SODIUM 500 MILLIGRAM(S): 500 TABLET, DELAYED RELEASE ORAL at 16:53

## 2024-01-26 RX ADMIN — HALOPERIDOL DECANOATE 5 MILLIGRAM(S): 100 INJECTION INTRAMUSCULAR at 16:53

## 2024-01-26 RX ADMIN — CARBIDOPA AND LEVODOPA 1 TABLET(S): 25; 100 TABLET ORAL at 12:44

## 2024-01-26 RX ADMIN — Medication 50 MILLIGRAM(S): at 09:46

## 2024-01-26 RX ADMIN — VALACYCLOVIR 1000 MILLIGRAM(S): 500 TABLET, FILM COATED ORAL at 22:27

## 2024-01-26 RX ADMIN — Medication 4: at 16:47

## 2024-01-26 RX ADMIN — Medication 2 MILLIGRAM(S): at 02:17

## 2024-01-26 RX ADMIN — DIVALPROEX SODIUM 500 MILLIGRAM(S): 500 TABLET, DELAYED RELEASE ORAL at 12:44

## 2024-01-26 RX ADMIN — VALACYCLOVIR 1000 MILLIGRAM(S): 500 TABLET, FILM COATED ORAL at 09:47

## 2024-01-26 RX ADMIN — SENNA PLUS 2 TABLET(S): 8.6 TABLET ORAL at 22:27

## 2024-01-26 NOTE — BH INPATIENT PSYCHIATRY PROGRESS NOTE - NSBHCHARTREVIEWVS_PSY_A_CORE FT
Vital Signs Last 24 Hrs  T(C): 36.5 (01-26-24 @ 09:00), Max: 36.5 (01-26-24 @ 09:00)  T(F): 97.7 (01-26-24 @ 09:00), Max: 97.7 (01-26-24 @ 09:00)  HR: 108 (01-26-24 @ 09:00) (68 - 108)  BP: 166/95 (01-26-24 @ 09:00) (114/56 - 166/95)  BP(mean): --  RR: 16 (01-26-24 @ 02:19) (16 - 16)  SpO2: --    Orthostatic VS  01-25-24 @ 09:20  Lying BP: --/-- HR: --  Sitting BP: 150/79 HR: 95  Standing BP: 145/78 HR: 103  Site: --  Mode: --

## 2024-01-26 NOTE — BH INPATIENT PSYCHIATRY PROGRESS NOTE - NSBHASSESSSUMMFT_PSY_ALL_CORE
- would consider Parkinson's Dementia as a diagnosis in this case more then Vascular Dementia  - consider switching out Seroquel to Zyprexa as Pt's symptoms and behaviors showed response to it when used PRN  - unclear what etiology the charted "seizure" history is   - UNCLEAR why patient is still on Valacyclovir 1000mg PO bid (was on it on admission as well) - medical notes do not mention this   1/18 Patient agitated labile aggressive. Spoke with NP Pal from facility. Patient was on Seroquel and haldol but haldol tapered then dced 10/23 due to EPS which may have eventually led to decompensation. She feels these epsiodes of illness are manic like c/w schizoaffective dx.  Patient compliant in facility most of time. Diet there was mild thickened fluid and soft food. Suspect patient has Parkinsonism not PD given tremor occurring on 10mg/d haldol. Seroquel not effective as monotherapy. Will change to olanzapine consider some reduction in Sinemet, will increase Depakote and change to Sprinkles and target blood level over 50 especially since she has sz disorder. Consider attempt to at least reduce klonopin  1/19 Patient agitated, psychotic, suspect manic component. Will increase po olanzapine but given poor response to IM olanzapine and lability to leverage synergistic use of BDZ will make haldol /Ativan the prn hopefully if olanzapine effective will see reduced use of haldol prn. Patient may if not responding to olanzpine go back to haldol which worked but had EPS issues. Will plan recheck VPA  level next week possibly increase Depakote. Ekg ordered by resident but refused. Given baseline Qtc of 428 feel safe to defer EKG til patient calmer  1/20: Agitated overnight, dancing, slamming newspaper on the floor and turning on lights on sleeping peer's rooms. Medicated with PRN Haldol 5mg/Ativan 1mg around 4:20AM. Irritable, confused and demanding during encounter, unable to engage meaningfully. Refused EKG yesterday. Ordered bloodwork for tomorrow in case pt is more emenable. Refusing vital signs.  1/21: remains impulsive, agitated requiring PRN meds, uncooperative, loud and easily agitated, refused labs but allowed vitals, noted stable.  1/22 Schizoaffective manic with hyper religiosity some sexual pre-occupation but not acting on it, increased motor activity. Will increase olanzapine and Depakote Will try to simplify regimen to maximize compliance. Will try to phase out haldol/Ativan when more stable. LAbs can be repeated when calmer, more cooperative.  1/23 Patient with psychosis, shabbir, remains agitated needing prns. Given need for frequent IMs of haldol and Ativan poor reposnse to po and IM Zyprexa, hx of good response to haldol, unlikliness she has true Parkinsons disease will dc olanzapine and add haldol 5mg bid allowing for monotherapy treatment with med she has responded to in past Cont klonopin and Depakote   1/24 Remains manic perhaps  slightly better than admit despite receiving IMs freuently. COnt haldol and depakote and prns. Will add MVI at patient request  1/25 Patient manic sl better at this moment after taking AM meds and receiving prn. Will do mouth checks to make sure she is taking her meds, will ask for dietary consult  1/26 Modest improvement tolerating meds with some tremor, highly doubt PD dx. Cont current medsd consider increment in haldol and plan VPA level at steady state with regular compliance. Consider low dose li if not improving

## 2024-01-26 NOTE — BH INPATIENT PSYCHIATRY PROGRESS NOTE - CURRENT MEDICATION
MEDICATIONS  (STANDING):  ammonium lactate 12% Lotion 1 Application(s) Topical two times a day  carbidopa/levodopa  25/100 1 Tablet(s) Oral <User Schedule>  clonazePAM Oral Disintegrating Tablet 0.5 milliGRAM(s) Oral <User Schedule>  dextrose 5%. 1000 milliLiter(s) (50 mL/Hr) IV Continuous <Continuous>  dextrose 5%. 1000 milliLiter(s) (100 mL/Hr) IV Continuous <Continuous>  dextrose 50% Injectable 25 Gram(s) IV Push once  dextrose 50% Injectable 12.5 Gram(s) IV Push once  dextrose 50% Injectable 25 Gram(s) IV Push once  divalproex Sprinkle 500 milliGRAM(s) Oral <User Schedule>  glucagon  Injectable 1 milliGRAM(s) IntraMuscular once  haloperidol     Tablet 5 milliGRAM(s) Oral <User Schedule>  hemorrhoidal Ointment 1 Application(s) Rectal daily  insulin lispro (ADMELOG) corrective regimen sliding scale   SubCutaneous three times a day before meals  levothyroxine 75 MICROGram(s) Oral daily  lisinopril 20 milliGRAM(s) Oral daily  metoprolol succinate ER 50 milliGRAM(s) Oral daily  polyethylene glycol 3350 17 Gram(s) Oral daily  senna 2 Tablet(s) Oral at bedtime  valACYclovir 1000 milliGRAM(s) Oral every 12 hours    MEDICATIONS  (PRN):  albuterol    90 MICROgram(s) HFA Inhaler 2 Puff(s) Inhalation every 6 hours PRN Shortness of Breath and/or Wheezing  dextrose Oral Gel 15 Gram(s) Oral once PRN Blood Glucose LESS THAN 70 milliGRAM(s)/deciliter  haloperidol     Tablet 5 milliGRAM(s) Oral every 6 hours PRN agitation  haloperidol    Injectable 5 milliGRAM(s) IntraMuscular once PRN Severe Agitation  haloperidol    Injectable 5 milliGRAM(s) IntraMuscular once PRN severe agitation  hydrocortisone 2.5% Rectal Cream 1 Application(s) Rectal two times a day PRN hemorrhoids  LORazepam     Tablet 1 milliGRAM(s) Oral every 6 hours PRN agitation  LORazepam   Injectable 2 milliGRAM(s) IntraMuscular once PRN Severe agitation
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
## 2024-01-26 NOTE — BH INPATIENT PSYCHIATRY PROGRESS NOTE - NSBHFUPINTERVALHXFT_PSY_A_CORE
Patient seen for schizoaffective disorder, chart reviewed. Patient received IM at 2AM.VSS. She will bouts of severe agitation but some trend to need for less daily prns

## 2024-01-26 NOTE — BH INPATIENT PSYCHIATRY PROGRESS NOTE - MSE UNSTRUCTURED FT
Pt appears stated age, awake,  alert, poor relatedness  though sl more able to engage her.  Showed writer a receipt from a bookstore in [place]. Stable gait noted.  Speech hard to understand due to poor articulation and edentulous.Has tremor R more than L but no evidence of rigidity, walks well, no shuffling or festination.  Mood elevated affect labile appearing elevated or irritable/angry. No suicidal/homicidal ideas/plan expressed. Focused on getting certain vegetables Thinking disjointed. Cannot assess cognition. Poor insight and judgement, is impulsive.

## 2024-01-26 NOTE — BH INPATIENT PSYCHIATRY PROGRESS NOTE - NSBHMETABOLIC_PSY_ALL_CORE_FT
BMI: BMI (kg/m2): 30.2 (01-15-24 @ 12:52)  HbA1c: A1C with Estimated Average Glucose Result: 7.6 % (01-12-24 @ 12:40)    Glucose: POCT Blood Glucose.: 255 mg/dL (01-26-24 @ 12:20)    BP: 166/95 (01-26-24 @ 09:00) (114/56 - 166/95)Vital Signs Last 24 Hrs  T(C): 36.5 (01-26-24 @ 09:00), Max: 36.5 (01-26-24 @ 09:00)  T(F): 97.7 (01-26-24 @ 09:00), Max: 97.7 (01-26-24 @ 09:00)  HR: 108 (01-26-24 @ 09:00) (68 - 108)  BP: 166/95 (01-26-24 @ 09:00) (114/56 - 166/95)  BP(mean): --  RR: 16 (01-26-24 @ 02:19) (16 - 16)  SpO2: --    Orthostatic VS  01-25-24 @ 09:20  Lying BP: --/-- HR: --  Sitting BP: 150/79 HR: 95  Standing BP: 145/78 HR: 103  Site: --  Mode: --    Lipid Panel:

## 2024-01-27 LAB — GLUCOSE BLDC GLUCOMTR-MCNC: 177 MG/DL — HIGH (ref 70–99)

## 2024-01-27 PROCEDURE — 99232 SBSQ HOSP IP/OBS MODERATE 35: CPT

## 2024-01-27 RX ORDER — HALOPERIDOL DECANOATE 100 MG/ML
5 INJECTION INTRAMUSCULAR ONCE
Refills: 0 | Status: COMPLETED | OUTPATIENT
Start: 2024-01-27 | End: 2024-01-27

## 2024-01-27 RX ADMIN — POLYETHYLENE GLYCOL 3350 17 GRAM(S): 17 POWDER, FOR SOLUTION ORAL at 10:05

## 2024-01-27 RX ADMIN — Medication 1 TABLET(S): at 17:09

## 2024-01-27 RX ADMIN — VALACYCLOVIR 1000 MILLIGRAM(S): 500 TABLET, FILM COATED ORAL at 10:10

## 2024-01-27 RX ADMIN — Medication 1 MILLIGRAM(S): at 20:18

## 2024-01-27 RX ADMIN — HALOPERIDOL DECANOATE 5 MILLIGRAM(S): 100 INJECTION INTRAMUSCULAR at 17:09

## 2024-01-27 RX ADMIN — Medication 75 MICROGRAM(S): at 05:55

## 2024-01-27 RX ADMIN — DIVALPROEX SODIUM 500 MILLIGRAM(S): 500 TABLET, DELAYED RELEASE ORAL at 10:05

## 2024-01-27 RX ADMIN — VALACYCLOVIR 1000 MILLIGRAM(S): 500 TABLET, FILM COATED ORAL at 20:17

## 2024-01-27 RX ADMIN — Medication 0.5 MILLIGRAM(S): at 17:09

## 2024-01-27 RX ADMIN — Medication 0.5 MILLIGRAM(S): at 10:08

## 2024-01-27 RX ADMIN — HALOPERIDOL DECANOATE 5 MILLIGRAM(S): 100 INJECTION INTRAMUSCULAR at 10:07

## 2024-01-27 RX ADMIN — Medication 1 MILLIGRAM(S): at 23:38

## 2024-01-27 RX ADMIN — CARBIDOPA AND LEVODOPA 1 TABLET(S): 25; 100 TABLET ORAL at 10:09

## 2024-01-27 RX ADMIN — Medication 1: at 17:07

## 2024-01-27 RX ADMIN — HALOPERIDOL DECANOATE 5 MILLIGRAM(S): 100 INJECTION INTRAMUSCULAR at 23:38

## 2024-01-27 RX ADMIN — DIVALPROEX SODIUM 500 MILLIGRAM(S): 500 TABLET, DELAYED RELEASE ORAL at 17:09

## 2024-01-27 RX ADMIN — SENNA PLUS 2 TABLET(S): 8.6 TABLET ORAL at 20:18

## 2024-01-27 NOTE — BH INPATIENT PSYCHIATRY PROGRESS NOTE - NSBHFUPINTERVALHXFT_PSY_A_CORE
Patient seen for schizoaffective disorder, chart reviewed. Patient received IM overnight.Refused vitals so far. She will bouts of severe agitation but some trend to need for less daily prns

## 2024-01-27 NOTE — BH INPATIENT PSYCHIATRY PROGRESS NOTE - NSBHMETABOLIC_PSY_ALL_CORE_FT
BMI: BMI (kg/m2): 30.2 (01-15-24 @ 12:52)  HbA1c: A1C with Estimated Average Glucose Result: 7.6 % (01-12-24 @ 12:40)    Glucose: POCT Blood Glucose.: 304 mg/dL (01-26-24 @ 16:29)    BP: 166/95 (01-26-24 @ 09:00) (114/56 - 166/95)Vital Signs Last 24 Hrs  T(C): --  T(F): --  HR: --  BP: --  BP(mean): --  RR: --  SpO2: --      Lipid Panel:

## 2024-01-27 NOTE — BH INPATIENT PSYCHIATRY PROGRESS NOTE - MSE UNSTRUCTURED FT
Pt appears stated age, awake,  alert, poor relatedness  though sl more able to engage her.  Showed writer a receipt from a bookstore in [place]. Stable gait noted.  Speech hard to understand due to poor articulation and edentulous.Has tremor R more than L but no evidence of rigidity, walks well, no shuffling or festination.  Mood elevated affect labile appearing elevated or irritable/angry. No suicidal/homicidal ideas/plan expressed. Focused on getting certain vegetables and vitamins Thinking disjointed. Cannot assess cognition. Poor insight and judgement, is impulsive.

## 2024-01-27 NOTE — BH INPATIENT PSYCHIATRY PROGRESS NOTE - NSBHASSESSSUMMFT_PSY_ALL_CORE
- would consider Parkinson's Dementia as a diagnosis in this case more then Vascular Dementia  - consider switching out Seroquel to Zyprexa as Pt's symptoms and behaviors showed response to it when used PRN  - unclear what etiology the charted "seizure" history is   - UNCLEAR why patient is still on Valacyclovir 1000mg PO bid (was on it on admission as well) - medical notes do not mention this   1/18 Patient agitated labile aggressive. Spoke with NP Pal from facility. Patient was on Seroquel and haldol but haldol tapered then dced 10/23 due to EPS which may have eventually led to decompensation. She feels these epsiodes of illness are manic like c/w schizoaffective dx.  Patient compliant in facility most of time. Diet there was mild thickened fluid and soft food. Suspect patient has Parkinsonism not PD given tremor occurring on 10mg/d haldol. Seroquel not effective as monotherapy. Will change to olanzapine consider some reduction in Sinemet, will increase Depakote and change to Sprinkles and target blood level over 50 especially since she has sz disorder. Consider attempt to at least reduce klonopin  1/19 Patient agitated, psychotic, suspect manic component. Will increase po olanzapine but given poor response to IM olanzapine and lability to leverage synergistic use of BDZ will make haldol /Ativan the prn hopefully if olanzapine effective will see reduced use of haldol prn. Patient may if not responding to olanzpine go back to haldol which worked but had EPS issues. Will plan recheck VPA  level next week possibly increase Depakote. Ekg ordered by resident but refused. Given baseline Qtc of 428 feel safe to defer EKG til patient calmer  1/20: Agitated overnight, dancing, slamming newspaper on the floor and turning on lights on sleeping peer's rooms. Medicated with PRN Haldol 5mg/Ativan 1mg around 4:20AM. Irritable, confused and demanding during encounter, unable to engage meaningfully. Refused EKG yesterday. Ordered bloodwork for tomorrow in case pt is more emenable. Refusing vital signs.  1/21: remains impulsive, agitated requiring PRN meds, uncooperative, loud and easily agitated, refused labs but allowed vitals, noted stable.  1/22 Schizoaffective manic with hyper religiosity some sexual pre-occupation but not acting on it, increased motor activity. Will increase olanzapine and Depakote Will try to simplify regimen to maximize compliance. Will try to phase out haldol/Ativan when more stable. LAbs can be repeated when calmer, more cooperative.  1/23 Patient with psychosis, shabbir, remains agitated needing prns. Given need for frequent IMs of haldol and Ativan poor reposnse to po and IM Zyprexa, hx of good response to haldol, unlikliness she has true Parkinsons disease will dc olanzapine and add haldol 5mg bid allowing for monotherapy treatment with med she has responded to in past Cont klonopin and Depakote   1/24 Remains manic perhaps  slightly better than admit despite receiving IMs freuently. COnt haldol and depakote and prns. Will add MVI at patient request  1/25 Patient manic sl better at this moment after taking AM meds and receiving prn. Will do mouth checks to make sure she is taking her meds, will ask for dietary consult  1/26 Modest improvement tolerating meds with some tremor, highly doubt PD dx. Cont current medsd consider increment in haldol and plan VPA level at steady state with regular compliance. Consider low dose li if not improving  1/27 Very modest gains, less prns. Cont meds plan blood level VPA, will add MVI

## 2024-01-27 NOTE — BH INPATIENT PSYCHIATRY PROGRESS NOTE - CURRENT MEDICATION
MEDICATIONS  (STANDING):  ammonium lactate 12% Lotion 1 Application(s) Topical two times a day  carbidopa/levodopa  25/100 1 Tablet(s) Oral <User Schedule>  clonazePAM Oral Disintegrating Tablet 0.5 milliGRAM(s) Oral <User Schedule>  dextrose 5%. 1000 milliLiter(s) (100 mL/Hr) IV Continuous <Continuous>  dextrose 5%. 1000 milliLiter(s) (50 mL/Hr) IV Continuous <Continuous>  dextrose 50% Injectable 25 Gram(s) IV Push once  dextrose 50% Injectable 12.5 Gram(s) IV Push once  dextrose 50% Injectable 25 Gram(s) IV Push once  divalproex Sprinkle 500 milliGRAM(s) Oral <User Schedule>  glucagon  Injectable 1 milliGRAM(s) IntraMuscular once  haloperidol     Tablet 5 milliGRAM(s) Oral <User Schedule>  hemorrhoidal Ointment 1 Application(s) Rectal daily  insulin lispro (ADMELOG) corrective regimen sliding scale   SubCutaneous three times a day before meals  levothyroxine 75 MICROGram(s) Oral daily  lisinopril 20 milliGRAM(s) Oral daily  metoprolol succinate ER 50 milliGRAM(s) Oral daily  multivitamin 1 Tablet(s) Oral <User Schedule>  polyethylene glycol 3350 17 Gram(s) Oral daily  senna 2 Tablet(s) Oral at bedtime  valACYclovir 1000 milliGRAM(s) Oral every 12 hours    MEDICATIONS  (PRN):  albuterol    90 MICROgram(s) HFA Inhaler 2 Puff(s) Inhalation every 6 hours PRN Shortness of Breath and/or Wheezing  dextrose Oral Gel 15 Gram(s) Oral once PRN Blood Glucose LESS THAN 70 milliGRAM(s)/deciliter  haloperidol     Tablet 5 milliGRAM(s) Oral every 6 hours PRN agitation  haloperidol    Injectable 5 milliGRAM(s) IntraMuscular once PRN severe agitation  haloperidol    Injectable 5 milliGRAM(s) IntraMuscular once PRN Severe Agitation  hydrocortisone 2.5% Rectal Cream 1 Application(s) Rectal two times a day PRN hemorrhoids  LORazepam     Tablet 1 milliGRAM(s) Oral every 6 hours PRN agitation  LORazepam   Injectable 2 milliGRAM(s) IntraMuscular once PRN Severe agitation

## 2024-01-28 LAB
GLUCOSE BLDC GLUCOMTR-MCNC: 232 MG/DL — HIGH (ref 70–99)
GLUCOSE BLDC GLUCOMTR-MCNC: 252 MG/DL — HIGH (ref 70–99)

## 2024-01-28 PROCEDURE — 99232 SBSQ HOSP IP/OBS MODERATE 35: CPT

## 2024-01-28 RX ORDER — QUETIAPINE FUMARATE 200 MG/1
50 TABLET, FILM COATED ORAL AT BEDTIME
Refills: 0 | Status: DISCONTINUED | OUTPATIENT
Start: 2024-01-28 | End: 2024-01-29

## 2024-01-28 RX ADMIN — QUETIAPINE FUMARATE 50 MILLIGRAM(S): 200 TABLET, FILM COATED ORAL at 20:08

## 2024-01-28 RX ADMIN — DIVALPROEX SODIUM 500 MILLIGRAM(S): 500 TABLET, DELAYED RELEASE ORAL at 13:17

## 2024-01-28 RX ADMIN — HALOPERIDOL DECANOATE 5 MILLIGRAM(S): 100 INJECTION INTRAMUSCULAR at 17:05

## 2024-01-28 RX ADMIN — DIVALPROEX SODIUM 500 MILLIGRAM(S): 500 TABLET, DELAYED RELEASE ORAL at 09:45

## 2024-01-28 RX ADMIN — POLYETHYLENE GLYCOL 3350 17 GRAM(S): 17 POWDER, FOR SOLUTION ORAL at 09:45

## 2024-01-28 RX ADMIN — VALACYCLOVIR 1000 MILLIGRAM(S): 500 TABLET, FILM COATED ORAL at 09:44

## 2024-01-28 RX ADMIN — VALACYCLOVIR 1000 MILLIGRAM(S): 500 TABLET, FILM COATED ORAL at 20:09

## 2024-01-28 RX ADMIN — CARBIDOPA AND LEVODOPA 1 TABLET(S): 25; 100 TABLET ORAL at 13:17

## 2024-01-28 RX ADMIN — Medication 3: at 16:52

## 2024-01-28 RX ADMIN — Medication 1 MILLIGRAM(S): at 09:44

## 2024-01-28 RX ADMIN — Medication 0.5 MILLIGRAM(S): at 09:45

## 2024-01-28 RX ADMIN — HALOPERIDOL DECANOATE 5 MILLIGRAM(S): 100 INJECTION INTRAMUSCULAR at 20:09

## 2024-01-28 RX ADMIN — Medication 1 TABLET(S): at 17:06

## 2024-01-28 RX ADMIN — DIVALPROEX SODIUM 500 MILLIGRAM(S): 500 TABLET, DELAYED RELEASE ORAL at 17:05

## 2024-01-28 RX ADMIN — CARBIDOPA AND LEVODOPA 1 TABLET(S): 25; 100 TABLET ORAL at 17:05

## 2024-01-28 RX ADMIN — Medication 0.5 MILLIGRAM(S): at 17:05

## 2024-01-28 RX ADMIN — Medication 1 MILLIGRAM(S): at 20:08

## 2024-01-28 RX ADMIN — HALOPERIDOL DECANOATE 5 MILLIGRAM(S): 100 INJECTION INTRAMUSCULAR at 09:44

## 2024-01-28 RX ADMIN — CARBIDOPA AND LEVODOPA 1 TABLET(S): 25; 100 TABLET ORAL at 09:44

## 2024-01-28 RX ADMIN — SENNA PLUS 2 TABLET(S): 8.6 TABLET ORAL at 20:08

## 2024-01-28 NOTE — BH INPATIENT PSYCHIATRY PROGRESS NOTE - NSBHASSESSSUMMFT_PSY_ALL_CORE
- would consider Parkinson's Dementia as a diagnosis in this case more then Vascular Dementia  - consider switching out Seroquel to Zyprexa as Pt's symptoms and behaviors showed response to it when used PRN  - unclear what etiology the charted "seizure" history is   - UNCLEAR why patient is still on Valacyclovir 1000mg PO bid (was on it on admission as well) - medical notes do not mention this   1/18 Patient agitated labile aggressive. Spoke with NP Pal from facility. Patient was on Seroquel and haldol but haldol tapered then dced 10/23 due to EPS which may have eventually led to decompensation. She feels these epsiodes of illness are manic like c/w schizoaffective dx.  Patient compliant in facility most of time. Diet there was mild thickened fluid and soft food. Suspect patient has Parkinsonism not PD given tremor occurring on 10mg/d haldol. Seroquel not effective as monotherapy. Will change to olanzapine consider some reduction in Sinemet, will increase Depakote and change to Sprinkles and target blood level over 50 especially since she has sz disorder. Consider attempt to at least reduce klonopin  1/19 Patient agitated, psychotic, suspect manic component. Will increase po olanzapine but given poor response to IM olanzapine and lability to leverage synergistic use of BDZ will make haldol /Ativan the prn hopefully if olanzapine effective will see reduced use of haldol prn. Patient may if not responding to olanzpine go back to haldol which worked but had EPS issues. Will plan recheck VPA  level next week possibly increase Depakote. Ekg ordered by resident but refused. Given baseline Qtc of 428 feel safe to defer EKG til patient calmer  1/20: Agitated overnight, dancing, slamming newspaper on the floor and turning on lights on sleeping peer's rooms. Medicated with PRN Haldol 5mg/Ativan 1mg around 4:20AM. Irritable, confused and demanding during encounter, unable to engage meaningfully. Refused EKG yesterday. Ordered bloodwork for tomorrow in case pt is more emenable. Refusing vital signs.  1/21: remains impulsive, agitated requiring PRN meds, uncooperative, loud and easily agitated, refused labs but allowed vitals, noted stable.  1/22 Schizoaffective manic with hyper religiosity some sexual pre-occupation but not acting on it, increased motor activity. Will increase olanzapine and Depakote Will try to simplify regimen to maximize compliance. Will try to phase out haldol/Ativan when more stable. LAbs can be repeated when calmer, more cooperative.  1/23 Patient with psychosis, shabbir, remains agitated needing prns. Given need for frequent IMs of haldol and Ativan poor reposnse to po and IM Zyprexa, hx of good response to haldol, unlikliness she has true Parkinsons disease will dc olanzapine and add haldol 5mg bid allowing for monotherapy treatment with med she has responded to in past Cont klonopin and Depakote   1/24 Remains manic perhaps  slightly better than admit despite receiving IMs freuently. COnt haldol and depakote and prns. Will add MVI at patient request  1/25 Patient manic sl better at this moment after taking AM meds and receiving prn. Will do mouth checks to make sure she is taking her meds, will ask for dietary consult  1/26 Modest improvement tolerating meds with some tremor, highly doubt PD dx. Cont current medsd consider increment in haldol and plan VPA level at steady state with regular compliance. Consider low dose li if not improving  1/27 Very modest gains, less prns. Cont meds plan blood level VPA, will add MVI  1/2* Remains manic poor sleep, agitation virgilio dd back small dose Seroquel as she used to be on 150mg/d. Perhaps use of this may allow for less need for haldol and this less tremor

## 2024-01-28 NOTE — BH INPATIENT PSYCHIATRY PROGRESS NOTE - CURRENT MEDICATION
MEDICATIONS  (STANDING):  ammonium lactate 12% Lotion 1 Application(s) Topical two times a day  carbidopa/levodopa  25/100 1 Tablet(s) Oral <User Schedule>  clonazePAM Oral Disintegrating Tablet 0.5 milliGRAM(s) Oral <User Schedule>  dextrose 5%. 1000 milliLiter(s) (50 mL/Hr) IV Continuous <Continuous>  dextrose 5%. 1000 milliLiter(s) (100 mL/Hr) IV Continuous <Continuous>  dextrose 50% Injectable 25 Gram(s) IV Push once  dextrose 50% Injectable 12.5 Gram(s) IV Push once  dextrose 50% Injectable 25 Gram(s) IV Push once  divalproex Sprinkle 500 milliGRAM(s) Oral <User Schedule>  glucagon  Injectable 1 milliGRAM(s) IntraMuscular once  haloperidol     Tablet 5 milliGRAM(s) Oral <User Schedule>  hemorrhoidal Ointment 1 Application(s) Rectal daily  insulin lispro (ADMELOG) corrective regimen sliding scale   SubCutaneous three times a day before meals  levothyroxine 75 MICROGram(s) Oral daily  lisinopril 20 milliGRAM(s) Oral daily  metoprolol succinate ER 50 milliGRAM(s) Oral daily  multivitamin 1 Tablet(s) Oral <User Schedule>  polyethylene glycol 3350 17 Gram(s) Oral daily  QUEtiapine 50 milliGRAM(s) Oral at bedtime  senna 2 Tablet(s) Oral at bedtime  valACYclovir 1000 milliGRAM(s) Oral every 12 hours    MEDICATIONS  (PRN):  albuterol    90 MICROgram(s) HFA Inhaler 2 Puff(s) Inhalation every 6 hours PRN Shortness of Breath and/or Wheezing  dextrose Oral Gel 15 Gram(s) Oral once PRN Blood Glucose LESS THAN 70 milliGRAM(s)/deciliter  haloperidol     Tablet 5 milliGRAM(s) Oral every 6 hours PRN agitation  haloperidol    Injectable 5 milliGRAM(s) IntraMuscular once PRN severe agitation  haloperidol    Injectable 5 milliGRAM(s) IntraMuscular once PRN Severe Agitation  hydrocortisone 2.5% Rectal Cream 1 Application(s) Rectal two times a day PRN hemorrhoids  LORazepam     Tablet 1 milliGRAM(s) Oral every 6 hours PRN agitation  LORazepam   Injectable 2 milliGRAM(s) IntraMuscular once PRN Severe agitation

## 2024-01-28 NOTE — BH INPATIENT PSYCHIATRY PROGRESS NOTE - NSBHMETABOLIC_PSY_ALL_CORE_FT
BMI: BMI (kg/m2): 30.2 (01-15-24 @ 12:52)  HbA1c: A1C with Estimated Average Glucose Result: 7.6 % (01-12-24 @ 12:40)    Glucose: POCT Blood Glucose.: 177 mg/dL (01-27-24 @ 16:29)    BP: 166/95 (01-26-24 @ 09:00) (114/56 - 166/95)Vital Signs Last 24 Hrs  T(C): --  T(F): --  HR: --  BP: --  BP(mean): --  RR: --  SpO2: --      Lipid Panel:

## 2024-01-29 LAB
GLUCOSE BLDC GLUCOMTR-MCNC: 226 MG/DL — HIGH (ref 70–99)
GLUCOSE BLDC GLUCOMTR-MCNC: 259 MG/DL — HIGH (ref 70–99)

## 2024-01-29 PROCEDURE — 99232 SBSQ HOSP IP/OBS MODERATE 35: CPT

## 2024-01-29 RX ORDER — ACETAMINOPHEN 500 MG
650 TABLET ORAL EVERY 6 HOURS
Refills: 0 | Status: COMPLETED | OUTPATIENT
Start: 2024-01-29 | End: 2024-03-23

## 2024-01-29 RX ORDER — HALOPERIDOL DECANOATE 100 MG/ML
5 INJECTION INTRAMUSCULAR ONCE
Refills: 0 | Status: COMPLETED | OUTPATIENT
Start: 2024-01-29 | End: 2024-01-29

## 2024-01-29 RX ORDER — QUETIAPINE FUMARATE 200 MG/1
75 TABLET, FILM COATED ORAL AT BEDTIME
Refills: 0 | Status: DISCONTINUED | OUTPATIENT
Start: 2024-01-29 | End: 2024-01-30

## 2024-01-29 RX ORDER — METFORMIN HYDROCHLORIDE 850 MG/1
500 TABLET ORAL
Refills: 0 | Status: DISCONTINUED | OUTPATIENT
Start: 2024-01-29 | End: 2024-05-17

## 2024-01-29 RX ADMIN — CARBIDOPA AND LEVODOPA 1 TABLET(S): 25; 100 TABLET ORAL at 09:35

## 2024-01-29 RX ADMIN — LISINOPRIL 20 MILLIGRAM(S): 2.5 TABLET ORAL at 09:35

## 2024-01-29 RX ADMIN — HALOPERIDOL DECANOATE 5 MILLIGRAM(S): 100 INJECTION INTRAMUSCULAR at 22:59

## 2024-01-29 RX ADMIN — Medication 2 MILLIGRAM(S): at 05:39

## 2024-01-29 RX ADMIN — Medication 1 MILLIGRAM(S): at 22:59

## 2024-01-29 RX ADMIN — Medication 0.5 MILLIGRAM(S): at 09:35

## 2024-01-29 RX ADMIN — Medication 1 TABLET(S): at 17:21

## 2024-01-29 RX ADMIN — Medication 2: at 12:57

## 2024-01-29 RX ADMIN — CARBIDOPA AND LEVODOPA 1 TABLET(S): 25; 100 TABLET ORAL at 12:55

## 2024-01-29 RX ADMIN — VALACYCLOVIR 1000 MILLIGRAM(S): 500 TABLET, FILM COATED ORAL at 09:35

## 2024-01-29 RX ADMIN — HALOPERIDOL DECANOATE 5 MILLIGRAM(S): 100 INJECTION INTRAMUSCULAR at 09:35

## 2024-01-29 RX ADMIN — Medication 0.5 MILLIGRAM(S): at 17:21

## 2024-01-29 RX ADMIN — DIVALPROEX SODIUM 500 MILLIGRAM(S): 500 TABLET, DELAYED RELEASE ORAL at 12:55

## 2024-01-29 RX ADMIN — METFORMIN HYDROCHLORIDE 500 MILLIGRAM(S): 850 TABLET ORAL at 20:22

## 2024-01-29 RX ADMIN — CARBIDOPA AND LEVODOPA 1 TABLET(S): 25; 100 TABLET ORAL at 17:21

## 2024-01-29 RX ADMIN — SENNA PLUS 2 TABLET(S): 8.6 TABLET ORAL at 20:22

## 2024-01-29 RX ADMIN — DIVALPROEX SODIUM 500 MILLIGRAM(S): 500 TABLET, DELAYED RELEASE ORAL at 09:36

## 2024-01-29 RX ADMIN — Medication 1 APPLICATION(S): at 09:40

## 2024-01-29 RX ADMIN — VALACYCLOVIR 1000 MILLIGRAM(S): 500 TABLET, FILM COATED ORAL at 20:22

## 2024-01-29 RX ADMIN — HALOPERIDOL DECANOATE 5 MILLIGRAM(S): 100 INJECTION INTRAMUSCULAR at 05:39

## 2024-01-29 RX ADMIN — QUETIAPINE FUMARATE 75 MILLIGRAM(S): 200 TABLET, FILM COATED ORAL at 20:22

## 2024-01-29 RX ADMIN — Medication 50 MILLIGRAM(S): at 09:35

## 2024-01-29 RX ADMIN — POLYETHYLENE GLYCOL 3350 17 GRAM(S): 17 POWDER, FOR SOLUTION ORAL at 09:35

## 2024-01-29 RX ADMIN — DIVALPROEX SODIUM 500 MILLIGRAM(S): 500 TABLET, DELAYED RELEASE ORAL at 17:21

## 2024-01-29 RX ADMIN — HALOPERIDOL DECANOATE 5 MILLIGRAM(S): 100 INJECTION INTRAMUSCULAR at 17:21

## 2024-01-29 NOTE — BH INPATIENT PSYCHIATRY PROGRESS NOTE - NSBHMETABOLIC_PSY_ALL_CORE_FT
BMI: BMI (kg/m2): 30.2 (01-15-24 @ 12:52)  HbA1c: A1C with Estimated Average Glucose Result: 7.6 % (01-12-24 @ 12:40)    Glucose: POCT Blood Glucose.: 226 mg/dL (01-29-24 @ 12:07)    BP: --Vital Signs Last 24 Hrs  T(C): 36.7 (01-29-24 @ 07:39), Max: 36.7 (01-29-24 @ 07:39)  T(F): 98 (01-29-24 @ 07:39), Max: 98 (01-29-24 @ 07:39)  HR: --  BP: --  BP(mean): --  RR: --  SpO2: --    Orthostatic VS  01-29-24 @ 07:39  Lying BP: --/-- HR: --  Sitting BP: 133/85 HR: 83  Standing BP: --/-- HR: --  Site: --  Mode: --  Orthostatic VS  01-28-24 @ 11:09  Lying BP: --/-- HR: --  Sitting BP: 138/80 HR: --  Standing BP: --/-- HR: --  Site: upper left arm  Mode: electronic    Lipid Panel:

## 2024-01-29 NOTE — BH INPATIENT PSYCHIATRY PROGRESS NOTE - NSBHCHARTREVIEWVS_PSY_A_CORE FT
Vital Signs Last 24 Hrs  T(C): 36.7 (01-29-24 @ 07:39), Max: 36.7 (01-29-24 @ 07:39)  T(F): 98 (01-29-24 @ 07:39), Max: 98 (01-29-24 @ 07:39)  HR: --  BP: --  BP(mean): --  RR: --  SpO2: --    Orthostatic VS  01-29-24 @ 07:39  Lying BP: --/-- HR: --  Sitting BP: 133/85 HR: 83  Standing BP: --/-- HR: --  Site: --  Mode: --  Orthostatic VS  01-28-24 @ 11:09  Lying BP: --/-- HR: --  Sitting BP: 138/80 HR: --  Standing BP: --/-- HR: --  Site: upper left arm  Mode: electronic

## 2024-01-29 NOTE — BH INPATIENT PSYCHIATRY PROGRESS NOTE - PRN MEDS
MEDICATIONS  (PRN):  acetaminophen     Tablet .. 650 milliGRAM(s) Oral every 6 hours PRN Mild Pain (1 - 3)  albuterol    90 MICROgram(s) HFA Inhaler 2 Puff(s) Inhalation every 6 hours PRN Shortness of Breath and/or Wheezing  dextrose Oral Gel 15 Gram(s) Oral once PRN Blood Glucose LESS THAN 70 milliGRAM(s)/deciliter  haloperidol     Tablet 5 milliGRAM(s) Oral every 6 hours PRN agitation  haloperidol    Injectable 5 milliGRAM(s) IntraMuscular once PRN severe agitation  haloperidol    Injectable 5 milliGRAM(s) IntraMuscular once PRN Severe Agitation  hydrocortisone 2.5% Rectal Cream 1 Application(s) Rectal two times a day PRN hemorrhoids  LORazepam     Tablet 1 milliGRAM(s) Oral every 6 hours PRN agitation  LORazepam   Injectable 2 milliGRAM(s) IntraMuscular once PRN Severe agitation

## 2024-01-29 NOTE — BH INPATIENT PSYCHIATRY PROGRESS NOTE - NSBHFUPINTERVALHXFT_PSY_A_CORE
Patient seen for schizoaffective disorder, chart reviewed. Patient received IM overnight. VSS. Patient banged hand into TV seen by PA not injury. Eating fair sleep fiar. On exam no pain, swelling local tenderness no decreased ROM

## 2024-01-29 NOTE — CHART NOTE - NSCHARTNOTEFT_GEN_A_CORE
Notified by RN that patient punched the TV w/ her left hand. Patient seen and assessed. When writer attempted to discuss sequence of events leading to patient punching the TV, patient refuses to describe event. Upon assessment of patient's left hand, patient w/ mild tenderness over the third MCP head upon palpation. Patient otherwise has full ROM of the left hand, fingers, wrist, w/ adequate strength.  No evident ecchymosis, or deformities otherwise noted. Tylenol and Ice packs offered. No further medical intervention needed at this time.

## 2024-01-29 NOTE — BH INPATIENT PSYCHIATRY PROGRESS NOTE - CURRENT MEDICATION
MEDICATIONS  (STANDING):  ammonium lactate 12% Lotion 1 Application(s) Topical two times a day  carbidopa/levodopa  25/100 1 Tablet(s) Oral <User Schedule>  clonazePAM Oral Disintegrating Tablet 0.5 milliGRAM(s) Oral <User Schedule>  dextrose 5%. 1000 milliLiter(s) (50 mL/Hr) IV Continuous <Continuous>  dextrose 5%. 1000 milliLiter(s) (100 mL/Hr) IV Continuous <Continuous>  dextrose 50% Injectable 25 Gram(s) IV Push once  dextrose 50% Injectable 12.5 Gram(s) IV Push once  dextrose 50% Injectable 25 Gram(s) IV Push once  divalproex Sprinkle 500 milliGRAM(s) Oral <User Schedule>  glucagon  Injectable 1 milliGRAM(s) IntraMuscular once  haloperidol     Tablet 5 milliGRAM(s) Oral <User Schedule>  hemorrhoidal Ointment 1 Application(s) Rectal daily  insulin lispro (ADMELOG) corrective regimen sliding scale   SubCutaneous three times a day before meals  levothyroxine 75 MICROGram(s) Oral daily  lisinopril 20 milliGRAM(s) Oral daily  metFORMIN 500 milliGRAM(s) Oral two times a day  metoprolol succinate ER 50 milliGRAM(s) Oral daily  multivitamin 1 Tablet(s) Oral <User Schedule>  polyethylene glycol 3350 17 Gram(s) Oral daily  QUEtiapine 75 milliGRAM(s) Oral at bedtime  senna 2 Tablet(s) Oral at bedtime  valACYclovir 1000 milliGRAM(s) Oral every 12 hours    MEDICATIONS  (PRN):  acetaminophen     Tablet .. 650 milliGRAM(s) Oral every 6 hours PRN Mild Pain (1 - 3)  albuterol    90 MICROgram(s) HFA Inhaler 2 Puff(s) Inhalation every 6 hours PRN Shortness of Breath and/or Wheezing  dextrose Oral Gel 15 Gram(s) Oral once PRN Blood Glucose LESS THAN 70 milliGRAM(s)/deciliter  haloperidol     Tablet 5 milliGRAM(s) Oral every 6 hours PRN agitation  haloperidol    Injectable 5 milliGRAM(s) IntraMuscular once PRN severe agitation  haloperidol    Injectable 5 milliGRAM(s) IntraMuscular once PRN Severe Agitation  hydrocortisone 2.5% Rectal Cream 1 Application(s) Rectal two times a day PRN hemorrhoids  LORazepam     Tablet 1 milliGRAM(s) Oral every 6 hours PRN agitation  LORazepam   Injectable 2 milliGRAM(s) IntraMuscular once PRN Severe agitation

## 2024-01-29 NOTE — BH INPATIENT PSYCHIATRY PROGRESS NOTE - NSBHASSESSSUMMFT_PSY_ALL_CORE
- would consider Parkinson's Dementia as a diagnosis in this case more then Vascular Dementia  - consider switching out Seroquel to Zyprexa as Pt's symptoms and behaviors showed response to it when used PRN  - unclear what etiology the charted "seizure" history is   - UNCLEAR why patient is still on Valacyclovir 1000mg PO bid (was on it on admission as well) - medical notes do not mention this   1/18 Patient agitated labile aggressive. Spoke with NP Pal from facility. Patient was on Seroquel and haldol but haldol tapered then dced 10/23 due to EPS which may have eventually led to decompensation. She feels these epsiodes of illness are manic like c/w schizoaffective dx.  Patient compliant in facility most of time. Diet there was mild thickened fluid and soft food. Suspect patient has Parkinsonism not PD given tremor occurring on 10mg/d haldol. Seroquel not effective as monotherapy. Will change to olanzapine consider some reduction in Sinemet, will increase Depakote and change to Sprinkles and target blood level over 50 especially since she has sz disorder. Consider attempt to at least reduce klonopin  1/19 Patient agitated, psychotic, suspect manic component. Will increase po olanzapine but given poor response to IM olanzapine and lability to leverage synergistic use of BDZ will make haldol /Ativan the prn hopefully if olanzapine effective will see reduced use of haldol prn. Patient may if not responding to olanzpine go back to haldol which worked but had EPS issues. Will plan recheck VPA  level next week possibly increase Depakote. Ekg ordered by resident but refused. Given baseline Qtc of 428 feel safe to defer EKG til patient calmer  1/20: Agitated overnight, dancing, slamming newspaper on the floor and turning on lights on sleeping peer's rooms. Medicated with PRN Haldol 5mg/Ativan 1mg around 4:20AM. Irritable, confused and demanding during encounter, unable to engage meaningfully. Refused EKG yesterday. Ordered bloodwork for tomorrow in case pt is more emenable. Refusing vital signs.  1/21: remains impulsive, agitated requiring PRN meds, uncooperative, loud and easily agitated, refused labs but allowed vitals, noted stable.  1/22 Schizoaffective manic with hyper religiosity some sexual pre-occupation but not acting on it, increased motor activity. Will increase olanzapine and Depakote Will try to simplify regimen to maximize compliance. Will try to phase out haldol/Ativan when more stable. LAbs can be repeated when calmer, more cooperative.  1/23 Patient with psychosis, shabbir, remains agitated needing prns. Given need for frequent IMs of haldol and Ativan poor response to po and IM Zyprexa, hx of good response to haldol, unlikliness she has true Parkinsons disease will dc olanzapine and add haldol 5mg bid allowing for monotherapy treatment with med she has responded to in past Cont klonopin and Depakote   1/24 Remains manic perhaps  slightly better than admit despite receiving IMs freuently. Cont haldol and Depakote and prns. Will add MVI at patient request  1/25 Patient manic sl better at this moment after taking AM meds and receiving prn. Will do mouth checks to make sure she is taking her meds, will ask for dietary consult  1/26 Modest improvement tolerating meds with some tremor, highly doubt PD dx. Cont current meds consider increment in haldol and plan VPA level at steady state with regular compliance. Consider low dose li if not improving  1/27 Very modest gains, less prns. Cont meds plan blood level VPA, will add MVI  1/28 Remains manic poor sleep, agitation will add back small dose Seroquel as she used to be on 150mg/d. Perhaps use of this may allow for less need for haldol and this less tremor  1/29 Patient remains manic irritable, disorganized, intrusive, hypersexual. Has worsening of tremor no rigidity. Will increase Seroquel at hs as prefer to avoid much more use of haldol which  may worsen EPS. No evidence hand injury no need for imaging unless symptoms or exam changes

## 2024-01-30 LAB
GLUCOSE BLDC GLUCOMTR-MCNC: 117 MG/DL — HIGH (ref 70–99)
GLUCOSE BLDC GLUCOMTR-MCNC: 218 MG/DL — HIGH (ref 70–99)

## 2024-01-30 PROCEDURE — 99232 SBSQ HOSP IP/OBS MODERATE 35: CPT

## 2024-01-30 RX ORDER — QUETIAPINE FUMARATE 200 MG/1
100 TABLET, FILM COATED ORAL AT BEDTIME
Refills: 0 | Status: DISCONTINUED | OUTPATIENT
Start: 2024-01-30 | End: 2024-02-01

## 2024-01-30 RX ADMIN — Medication 1 TABLET(S): at 17:36

## 2024-01-30 RX ADMIN — Medication 0.5 MILLIGRAM(S): at 17:36

## 2024-01-30 RX ADMIN — Medication 1 APPLICATION(S): at 09:16

## 2024-01-30 RX ADMIN — VALACYCLOVIR 1000 MILLIGRAM(S): 500 TABLET, FILM COATED ORAL at 22:40

## 2024-01-30 RX ADMIN — CARBIDOPA AND LEVODOPA 1 TABLET(S): 25; 100 TABLET ORAL at 12:44

## 2024-01-30 RX ADMIN — HALOPERIDOL DECANOATE 5 MILLIGRAM(S): 100 INJECTION INTRAMUSCULAR at 17:36

## 2024-01-30 RX ADMIN — LISINOPRIL 20 MILLIGRAM(S): 2.5 TABLET ORAL at 09:15

## 2024-01-30 RX ADMIN — VALACYCLOVIR 1000 MILLIGRAM(S): 500 TABLET, FILM COATED ORAL at 09:15

## 2024-01-30 RX ADMIN — METFORMIN HYDROCHLORIDE 500 MILLIGRAM(S): 850 TABLET ORAL at 09:16

## 2024-01-30 RX ADMIN — POLYETHYLENE GLYCOL 3350 17 GRAM(S): 17 POWDER, FOR SOLUTION ORAL at 09:16

## 2024-01-30 RX ADMIN — DIVALPROEX SODIUM 500 MILLIGRAM(S): 500 TABLET, DELAYED RELEASE ORAL at 09:15

## 2024-01-30 RX ADMIN — Medication 0.5 MILLIGRAM(S): at 09:15

## 2024-01-30 RX ADMIN — SENNA PLUS 2 TABLET(S): 8.6 TABLET ORAL at 22:40

## 2024-01-30 RX ADMIN — QUETIAPINE FUMARATE 100 MILLIGRAM(S): 200 TABLET, FILM COATED ORAL at 22:40

## 2024-01-30 RX ADMIN — METFORMIN HYDROCHLORIDE 500 MILLIGRAM(S): 850 TABLET ORAL at 22:40

## 2024-01-30 RX ADMIN — CARBIDOPA AND LEVODOPA 1 TABLET(S): 25; 100 TABLET ORAL at 09:14

## 2024-01-30 RX ADMIN — DIVALPROEX SODIUM 500 MILLIGRAM(S): 500 TABLET, DELAYED RELEASE ORAL at 12:44

## 2024-01-30 RX ADMIN — Medication 2: at 08:29

## 2024-01-30 RX ADMIN — HALOPERIDOL DECANOATE 5 MILLIGRAM(S): 100 INJECTION INTRAMUSCULAR at 09:15

## 2024-01-30 RX ADMIN — CARBIDOPA AND LEVODOPA 1 TABLET(S): 25; 100 TABLET ORAL at 17:36

## 2024-01-30 RX ADMIN — Medication 50 MILLIGRAM(S): at 09:15

## 2024-01-30 RX ADMIN — DIVALPROEX SODIUM 500 MILLIGRAM(S): 500 TABLET, DELAYED RELEASE ORAL at 17:37

## 2024-01-30 NOTE — BH INPATIENT PSYCHIATRY PROGRESS NOTE - NSBHASSESSSUMMFT_PSY_ALL_CORE
- would consider Parkinson's Dementia as a diagnosis in this case more then Vascular Dementia  - consider switching out Seroquel to Zyprexa as Pt's symptoms and behaviors showed response to it when used PRN  - unclear what etiology the charted "seizure" history is   - UNCLEAR why patient is still on Valacyclovir 1000mg PO bid (was on it on admission as well) - medical notes do not mention this   1/18 Patient agitated labile aggressive. Spoke with NP Pal from facility. Patient was on Seroquel and haldol but haldol tapered then dced 10/23 due to EPS which may have eventually led to decompensation. She feels these epsiodes of illness are manic like c/w schizoaffective dx.  Patient compliant in facility most of time. Diet there was mild thickened fluid and soft food. Suspect patient has Parkinsonism not PD given tremor occurring on 10mg/d haldol. Seroquel not effective as monotherapy. Will change to olanzapine consider some reduction in Sinemet, will increase Depakote and change to Sprinkles and target blood level over 50 especially since she has sz disorder. Consider attempt to at least reduce klonopin  1/19 Patient agitated, psychotic, suspect manic component. Will increase po olanzapine but given poor response to IM olanzapine and lability to leverage synergistic use of BDZ will make haldol /Ativan the prn hopefully if olanzapine effective will see reduced use of haldol prn. Patient may if not responding to olanzpine go back to haldol which worked but had EPS issues. Will plan recheck VPA  level next week possibly increase Depakote. Ekg ordered by resident but refused. Given baseline Qtc of 428 feel safe to defer EKG til patient calmer  1/20: Agitated overnight, dancing, slamming newspaper on the floor and turning on lights on sleeping peer's rooms. Medicated with PRN Haldol 5mg/Ativan 1mg around 4:20AM. Irritable, confused and demanding during encounter, unable to engage meaningfully. Refused EKG yesterday. Ordered bloodwork for tomorrow in case pt is more emenable. Refusing vital signs.  1/21: remains impulsive, agitated requiring PRN meds, uncooperative, loud and easily agitated, refused labs but allowed vitals, noted stable.  1/22 Schizoaffective manic with hyper religiosity some sexual pre-occupation but not acting on it, increased motor activity. Will increase olanzapine and Depakote Will try to simplify regimen to maximize compliance. Will try to phase out haldol/Ativan when more stable. LAbs can be repeated when calmer, more cooperative.  1/23 Patient with psychosis, shabbir, remains agitated needing prns. Given need for frequent IMs of haldol and Ativan poor response to po and IM Zyprexa, hx of good response to haldol, unlikliness she has true Parkinsons disease will dc olanzapine and add haldol 5mg bid allowing for monotherapy treatment with med she has responded to in past Cont klonopin and Depakote   1/24 Remains manic perhaps  slightly better than admit despite receiving IMs freuently. Cont haldol and Depakote and prns. Will add MVI at patient request  1/25 Patient manic sl better at this moment after taking AM meds and receiving prn. Will do mouth checks to make sure she is taking her meds, will ask for dietary consult  1/26 Modest improvement tolerating meds with some tremor, highly doubt PD dx. Cont current meds consider increment in haldol and plan VPA level at steady state with regular compliance. Consider low dose li if not improving  1/27 Very modest gains, less prns. Cont meds plan blood level VPA, will add MVI  1/28 Remains manic poor sleep, agitation will add back small dose Seroquel as she used to be on 150mg/d. Perhaps use of this may allow for less need for haldol and this less tremor  1/29 Patient remains manic irritable, disorganized, intrusive, hypersexual. Has worsening of tremor no rigidity. Will increase Seroquel at hs as prefer to avoid much more use of haldol which  may worsen EPS. No evidence hand injury no need for imaging unless symptoms or exam changes  1/30 Some improvement since starting Seroquel back , will increase to 100mg will reduce dosing of prns

## 2024-01-30 NOTE — BH INPATIENT PSYCHIATRY PROGRESS NOTE - NSBHFUPINTERVALHXFT_PSY_A_CORE
Patient seen for schizoaffective disorder, chart reviewed. Patient received po prn overnight. Did not need IM.  VSS. . Eating fair sleep fair . Walking around with a blanket that had to be removed as was tripping hazard.

## 2024-01-30 NOTE — BH INPATIENT PSYCHIATRY PROGRESS NOTE - NSBHMETABOLIC_PSY_ALL_CORE_FT
BMI: BMI (kg/m2): 30.2 (01-15-24 @ 12:52)  HbA1c: A1C with Estimated Average Glucose Result: 7.6 % (01-12-24 @ 12:40)    Glucose: POCT Blood Glucose.: 218 mg/dL (01-30-24 @ 08:01)    BP: 129/98 (01-30-24 @ 09:05) (129/98 - 129/98)Vital Signs Last 24 Hrs  T(C): --  T(F): --  HR: 79 (01-30-24 @ 09:05) (79 - 79)  BP: 129/98 (01-30-24 @ 09:05) (129/98 - 129/98)  BP(mean): --  RR: --  SpO2: --    Orthostatic VS  01-29-24 @ 07:39  Lying BP: --/-- HR: --  Sitting BP: 133/85 HR: 83  Standing BP: --/-- HR: --  Site: --  Mode: --    Lipid Panel:

## 2024-01-30 NOTE — BH INPATIENT PSYCHIATRY PROGRESS NOTE - NSBHCHARTREVIEWVS_PSY_A_CORE FT
Vital Signs Last 24 Hrs  T(C): --  T(F): --  HR: 79 (01-30-24 @ 09:05) (79 - 79)  BP: 129/98 (01-30-24 @ 09:05) (129/98 - 129/98)  BP(mean): --  RR: --  SpO2: --    Orthostatic VS  01-29-24 @ 07:39  Lying BP: --/-- HR: --  Sitting BP: 133/85 HR: 83  Standing BP: --/-- HR: --  Site: --  Mode: --

## 2024-01-30 NOTE — BH INPATIENT PSYCHIATRY PROGRESS NOTE - MSE UNSTRUCTURED FT
Pt appears stated age, awake,  alert, poor relatedness  though sl more able to engage her. Stable gait noted.  Speech hard to understand due to poor articulation and edentulous.Has tremor R more than L but no evidence of rigidity, walks well, no shuffling or festination.  Mood elevated affect labile appearing elevated or irritable but a little better No suicidal/homicidal ideas/plan expressed. Focused on getting certain vegetables and vitamins Thinking disjointed. Cannot assess cognition. Poor insight and judgement, is impulsive.

## 2024-01-30 NOTE — BH INPATIENT PSYCHIATRY PROGRESS NOTE - CURRENT MEDICATION
MEDICATIONS  (STANDING):  ammonium lactate 12% Lotion 1 Application(s) Topical two times a day  carbidopa/levodopa  25/100 1 Tablet(s) Oral <User Schedule>  clonazePAM Oral Disintegrating Tablet 0.5 milliGRAM(s) Oral <User Schedule>  dextrose 5%. 1000 milliLiter(s) (50 mL/Hr) IV Continuous <Continuous>  dextrose 5%. 1000 milliLiter(s) (100 mL/Hr) IV Continuous <Continuous>  dextrose 50% Injectable 25 Gram(s) IV Push once  dextrose 50% Injectable 25 Gram(s) IV Push once  dextrose 50% Injectable 12.5 Gram(s) IV Push once  divalproex Sprinkle 500 milliGRAM(s) Oral <User Schedule>  glucagon  Injectable 1 milliGRAM(s) IntraMuscular once  haloperidol     Tablet 5 milliGRAM(s) Oral <User Schedule>  hemorrhoidal Ointment 1 Application(s) Rectal daily  insulin lispro (ADMELOG) corrective regimen sliding scale   SubCutaneous three times a day before meals  levothyroxine 75 MICROGram(s) Oral daily  lisinopril 20 milliGRAM(s) Oral daily  metFORMIN 500 milliGRAM(s) Oral two times a day  metoprolol succinate ER 50 milliGRAM(s) Oral daily  multivitamin 1 Tablet(s) Oral <User Schedule>  polyethylene glycol 3350 17 Gram(s) Oral daily  QUEtiapine 100 milliGRAM(s) Oral at bedtime  senna 2 Tablet(s) Oral at bedtime  valACYclovir 1000 milliGRAM(s) Oral every 12 hours    MEDICATIONS  (PRN):  acetaminophen     Tablet .. 650 milliGRAM(s) Oral every 6 hours PRN Mild Pain (1 - 3)  albuterol    90 MICROgram(s) HFA Inhaler 2 Puff(s) Inhalation every 6 hours PRN Shortness of Breath and/or Wheezing  dextrose Oral Gel 15 Gram(s) Oral once PRN Blood Glucose LESS THAN 70 milliGRAM(s)/deciliter  haloperidol     Tablet 5 milliGRAM(s) Oral every 6 hours PRN agitation  haloperidol    Injectable 5 milliGRAM(s) IntraMuscular once PRN severe agitation  haloperidol    Injectable 5 milliGRAM(s) IntraMuscular once PRN Severe Agitation  hydrocortisone 2.5% Rectal Cream 1 Application(s) Rectal two times a day PRN hemorrhoids  LORazepam     Tablet 1 milliGRAM(s) Oral every 6 hours PRN agitation  LORazepam   Injectable 2 milliGRAM(s) IntraMuscular once PRN Severe agitation

## 2024-01-31 LAB
ALBUMIN SERPL ELPH-MCNC: 4.2 G/DL — SIGNIFICANT CHANGE UP (ref 3.3–5)
ALP SERPL-CCNC: 134 U/L — HIGH (ref 40–120)
ALT FLD-CCNC: <5 U/L — SIGNIFICANT CHANGE UP (ref 4–33)
ANION GAP SERPL CALC-SCNC: 12 MMOL/L — SIGNIFICANT CHANGE UP (ref 7–14)
AST SERPL-CCNC: 20 U/L — SIGNIFICANT CHANGE UP (ref 4–32)
BASOPHILS # BLD AUTO: 0.03 K/UL — SIGNIFICANT CHANGE UP (ref 0–0.2)
BASOPHILS NFR BLD AUTO: 0.4 % — SIGNIFICANT CHANGE UP (ref 0–2)
BILIRUB SERPL-MCNC: 0.2 MG/DL — SIGNIFICANT CHANGE UP (ref 0.2–1.2)
BUN SERPL-MCNC: 23 MG/DL — SIGNIFICANT CHANGE UP (ref 7–23)
CALCIUM SERPL-MCNC: 10 MG/DL — SIGNIFICANT CHANGE UP (ref 8.4–10.5)
CHLORIDE SERPL-SCNC: 104 MMOL/L — SIGNIFICANT CHANGE UP (ref 98–107)
CO2 SERPL-SCNC: 25 MMOL/L — SIGNIFICANT CHANGE UP (ref 22–31)
CREAT SERPL-MCNC: 0.79 MG/DL — SIGNIFICANT CHANGE UP (ref 0.5–1.3)
EGFR: 80 ML/MIN/1.73M2 — SIGNIFICANT CHANGE UP
EOSINOPHIL # BLD AUTO: 0.21 K/UL — SIGNIFICANT CHANGE UP (ref 0–0.5)
EOSINOPHIL NFR BLD AUTO: 3 % — SIGNIFICANT CHANGE UP (ref 0–6)
GLUCOSE BLDC GLUCOMTR-MCNC: 180 MG/DL — HIGH (ref 70–99)
GLUCOSE BLDC GLUCOMTR-MCNC: 197 MG/DL — HIGH (ref 70–99)
GLUCOSE SERPL-MCNC: 147 MG/DL — HIGH (ref 70–99)
HCT VFR BLD CALC: 33.9 % — LOW (ref 34.5–45)
HGB BLD-MCNC: 10.8 G/DL — LOW (ref 11.5–15.5)
IANC: 4.23 K/UL — SIGNIFICANT CHANGE UP (ref 1.8–7.4)
IMM GRANULOCYTES NFR BLD AUTO: 0.7 % — SIGNIFICANT CHANGE UP (ref 0–0.9)
LYMPHOCYTES # BLD AUTO: 1.77 K/UL — SIGNIFICANT CHANGE UP (ref 1–3.3)
LYMPHOCYTES # BLD AUTO: 25.7 % — SIGNIFICANT CHANGE UP (ref 13–44)
MCHC RBC-ENTMCNC: 26.9 PG — LOW (ref 27–34)
MCHC RBC-ENTMCNC: 31.9 GM/DL — LOW (ref 32–36)
MCV RBC AUTO: 84.5 FL — SIGNIFICANT CHANGE UP (ref 80–100)
MONOCYTES # BLD AUTO: 0.61 K/UL — SIGNIFICANT CHANGE UP (ref 0–0.9)
MONOCYTES NFR BLD AUTO: 8.8 % — SIGNIFICANT CHANGE UP (ref 2–14)
NEUTROPHILS # BLD AUTO: 4.23 K/UL — SIGNIFICANT CHANGE UP (ref 1.8–7.4)
NEUTROPHILS NFR BLD AUTO: 61.4 % — SIGNIFICANT CHANGE UP (ref 43–77)
NRBC # BLD: 0 /100 WBCS — SIGNIFICANT CHANGE UP (ref 0–0)
NRBC # FLD: 0 K/UL — SIGNIFICANT CHANGE UP (ref 0–0)
PLATELET # BLD AUTO: 246 K/UL — SIGNIFICANT CHANGE UP (ref 150–400)
POTASSIUM SERPL-MCNC: 4.5 MMOL/L — SIGNIFICANT CHANGE UP (ref 3.5–5.3)
POTASSIUM SERPL-SCNC: 4.5 MMOL/L — SIGNIFICANT CHANGE UP (ref 3.5–5.3)
PROT SERPL-MCNC: 7.9 G/DL — SIGNIFICANT CHANGE UP (ref 6–8.3)
RBC # BLD: 4.01 M/UL — SIGNIFICANT CHANGE UP (ref 3.8–5.2)
RBC # FLD: 15.7 % — HIGH (ref 10.3–14.5)
SODIUM SERPL-SCNC: 141 MMOL/L — SIGNIFICANT CHANGE UP (ref 135–145)
VALPROATE SERPL-MCNC: 79.1 UG/ML — SIGNIFICANT CHANGE UP (ref 50–100)
WBC # BLD: 6.9 K/UL — SIGNIFICANT CHANGE UP (ref 3.8–10.5)
WBC # FLD AUTO: 6.9 K/UL — SIGNIFICANT CHANGE UP (ref 3.8–10.5)

## 2024-01-31 PROCEDURE — 99232 SBSQ HOSP IP/OBS MODERATE 35: CPT

## 2024-01-31 RX ORDER — HALOPERIDOL DECANOATE 100 MG/ML
5 INJECTION INTRAMUSCULAR ONCE
Refills: 0 | Status: COMPLETED | OUTPATIENT
Start: 2024-01-31 | End: 2024-01-31

## 2024-01-31 RX ADMIN — Medication 1 MILLIGRAM(S): at 08:20

## 2024-01-31 RX ADMIN — Medication 0.5 MILLIGRAM(S): at 17:09

## 2024-01-31 RX ADMIN — QUETIAPINE FUMARATE 100 MILLIGRAM(S): 200 TABLET, FILM COATED ORAL at 21:22

## 2024-01-31 RX ADMIN — Medication 1 TABLET(S): at 17:10

## 2024-01-31 RX ADMIN — METFORMIN HYDROCHLORIDE 500 MILLIGRAM(S): 850 TABLET ORAL at 21:22

## 2024-01-31 RX ADMIN — HALOPERIDOL DECANOATE 5 MILLIGRAM(S): 100 INJECTION INTRAMUSCULAR at 17:09

## 2024-01-31 RX ADMIN — HALOPERIDOL DECANOATE 5 MILLIGRAM(S): 100 INJECTION INTRAMUSCULAR at 08:06

## 2024-01-31 RX ADMIN — CARBIDOPA AND LEVODOPA 1 TABLET(S): 25; 100 TABLET ORAL at 08:06

## 2024-01-31 RX ADMIN — CARBIDOPA AND LEVODOPA 1 TABLET(S): 25; 100 TABLET ORAL at 17:09

## 2024-01-31 RX ADMIN — SENNA PLUS 2 TABLET(S): 8.6 TABLET ORAL at 21:22

## 2024-01-31 RX ADMIN — DIVALPROEX SODIUM 500 MILLIGRAM(S): 500 TABLET, DELAYED RELEASE ORAL at 08:21

## 2024-01-31 RX ADMIN — Medication 0.5 MILLIGRAM(S): at 08:06

## 2024-01-31 RX ADMIN — Medication 75 MICROGRAM(S): at 06:29

## 2024-01-31 RX ADMIN — HALOPERIDOL DECANOATE 5 MILLIGRAM(S): 100 INJECTION INTRAMUSCULAR at 08:20

## 2024-01-31 RX ADMIN — VALACYCLOVIR 1000 MILLIGRAM(S): 500 TABLET, FILM COATED ORAL at 21:22

## 2024-01-31 RX ADMIN — DIVALPROEX SODIUM 500 MILLIGRAM(S): 500 TABLET, DELAYED RELEASE ORAL at 17:10

## 2024-01-31 NOTE — BH INPATIENT PSYCHIATRY PROGRESS NOTE - PRN MEDS
MEDICATIONS  (PRN):  acetaminophen     Tablet .. 650 milliGRAM(s) Oral every 6 hours PRN Mild Pain (1 - 3)  albuterol    90 MICROgram(s) HFA Inhaler 2 Puff(s) Inhalation every 6 hours PRN Shortness of Breath and/or Wheezing  dextrose Oral Gel 15 Gram(s) Oral once PRN Blood Glucose LESS THAN 70 milliGRAM(s)/deciliter  haloperidol     Tablet 5 milliGRAM(s) Oral every 6 hours PRN agitation  haloperidol    Injectable 5 milliGRAM(s) IntraMuscular once PRN severe agitation  haloperidol    Injectable 5 milliGRAM(s) IntraMuscular once PRN Severe Agitation  hydrocortisone 2.5% Rectal Cream 1 Application(s) Rectal two times a day PRN hemorrhoids  LORazepam     Tablet 1 milliGRAM(s) Oral every 6 hours PRN agitation  LORazepam   Injectable 1 milliGRAM(s) IntraMuscular once PRN Severe agitation

## 2024-01-31 NOTE — BH INPATIENT PSYCHIATRY PROGRESS NOTE - NSBHFUPINTERVALHXFT_PSY_A_CORE
Patient seen for schizoaffective disorder, chart reviewed. Patient received po prn overnight.   Refused vitals. Eating fair sleep fair . Got into verbal altercation with peer able to be redirected after prn was able to talk pleasantly to same peer. Sleep poor apppetite good. Labs w/o severe abnormalities

## 2024-01-31 NOTE — BH INPATIENT PSYCHIATRY PROGRESS NOTE - NSBHASSESSSUMMFT_PSY_ALL_CORE
- would consider Parkinson's Dementia as a diagnosis in this case more then Vascular Dementia  - consider switching out Seroquel to Zyprexa as Pt's symptoms and behaviors showed response to it when used PRN  - unclear what etiology the charted "seizure" history is   - UNCLEAR why patient is still on Valacyclovir 1000mg PO bid (was on it on admission as well) - medical notes do not mention this   1/18 Patient agitated labile aggressive. Spoke with NP Pal from facility. Patient was on Seroquel and haldol but haldol tapered then dced 10/23 due to EPS which may have eventually led to decompensation. She feels these epsiodes of illness are manic like c/w schizoaffective dx.  Patient compliant in facility most of time. Diet there was mild thickened fluid and soft food. Suspect patient has Parkinsonism not PD given tremor occurring on 10mg/d haldol. Seroquel not effective as monotherapy. Will change to olanzapine consider some reduction in Sinemet, will increase Depakote and change to Sprinkles and target blood level over 50 especially since she has sz disorder. Consider attempt to at least reduce klonopin  1/19 Patient agitated, psychotic, suspect manic component. Will increase po olanzapine but given poor response to IM olanzapine and lability to leverage synergistic use of BDZ will make haldol /Ativan the prn hopefully if olanzapine effective will see reduced use of haldol prn. Patient may if not responding to olanzpine go back to haldol which worked but had EPS issues. Will plan recheck VPA  level next week possibly increase Depakote. Ekg ordered by resident but refused. Given baseline Qtc of 428 feel safe to defer EKG til patient calmer  1/20: Agitated overnight, dancing, slamming newspaper on the floor and turning on lights on sleeping peer's rooms. Medicated with PRN Haldol 5mg/Ativan 1mg around 4:20AM. Irritable, confused and demanding during encounter, unable to engage meaningfully. Refused EKG yesterday. Ordered bloodwork for tomorrow in case pt is more emenable. Refusing vital signs.  1/21: remains impulsive, agitated requiring PRN meds, uncooperative, loud and easily agitated, refused labs but allowed vitals, noted stable.  1/22 Schizoaffective manic with hyper religiosity some sexual pre-occupation but not acting on it, increased motor activity. Will increase olanzapine and Depakote Will try to simplify regimen to maximize compliance. Will try to phase out haldol/Ativan when more stable. LAbs can be repeated when calmer, more cooperative.  1/23 Patient with psychosis, shabbir, remains agitated needing prns. Given need for frequent IMs of haldol and Ativan poor response to po and IM Zyprexa, hx of good response to haldol, unlikliness she has true Parkinsons disease will dc olanzapine and add haldol 5mg bid allowing for monotherapy treatment with med she has responded to in past Cont klonopin and Depakote   1/24 Remains manic perhaps  slightly better than admit despite receiving IMs freuently. Cont haldol and Depakote and prns. Will add MVI at patient request  1/25 Patient manic sl better at this moment after taking AM meds and receiving prn. Will do mouth checks to make sure she is taking her meds, will ask for dietary consult  1/26 Modest improvement tolerating meds with some tremor, highly doubt PD dx. Cont current meds consider increment in haldol and plan VPA level at steady state with regular compliance. Consider low dose li if not improving  1/27 Very modest gains, less prns. Cont meds plan blood level VPA, will add MVI  1/28 Remains manic poor sleep, agitation will add back small dose Seroquel as she used to be on 150mg/d. Perhaps use of this may allow for less need for haldol and this less tremor  1/29 Patient remains manic irritable, disorganized, intrusive, hypersexual. Has worsening of tremor no rigidity. Will increase Seroquel at hs as prefer to avoid much more use of haldol which  may worsen EPS. No evidence hand injury no need for imaging unless symptoms or exam changes  1/30 Some improvement since starting Seroquel back , will increase to 100mg will reduce dosing of prns  1/31 Patient manic intrusive , still needing frequent prns will cont to titrate Seroquel and cont haldol po

## 2024-01-31 NOTE — BH INPATIENT PSYCHIATRY PROGRESS NOTE - CURRENT MEDICATION
MEDICATIONS  (STANDING):  ammonium lactate 12% Lotion 1 Application(s) Topical two times a day  carbidopa/levodopa  25/100 1 Tablet(s) Oral <User Schedule>  clonazePAM Oral Disintegrating Tablet 0.5 milliGRAM(s) Oral <User Schedule>  dextrose 5%. 1000 milliLiter(s) (50 mL/Hr) IV Continuous <Continuous>  dextrose 5%. 1000 milliLiter(s) (100 mL/Hr) IV Continuous <Continuous>  dextrose 50% Injectable 25 Gram(s) IV Push once  dextrose 50% Injectable 25 Gram(s) IV Push once  dextrose 50% Injectable 12.5 Gram(s) IV Push once  divalproex Sprinkle 500 milliGRAM(s) Oral <User Schedule>  glucagon  Injectable 1 milliGRAM(s) IntraMuscular once  haloperidol     Tablet 5 milliGRAM(s) Oral <User Schedule>  hemorrhoidal Ointment 1 Application(s) Rectal daily  insulin lispro (ADMELOG) corrective regimen sliding scale   SubCutaneous three times a day before meals  levothyroxine 75 MICROGram(s) Oral daily  lisinopril 20 milliGRAM(s) Oral daily  metFORMIN 500 milliGRAM(s) Oral two times a day  metoprolol succinate ER 50 milliGRAM(s) Oral daily  multivitamin 1 Tablet(s) Oral <User Schedule>  polyethylene glycol 3350 17 Gram(s) Oral daily  QUEtiapine 100 milliGRAM(s) Oral at bedtime  senna 2 Tablet(s) Oral at bedtime  valACYclovir 1000 milliGRAM(s) Oral every 12 hours    MEDICATIONS  (PRN):  acetaminophen     Tablet .. 650 milliGRAM(s) Oral every 6 hours PRN Mild Pain (1 - 3)  albuterol    90 MICROgram(s) HFA Inhaler 2 Puff(s) Inhalation every 6 hours PRN Shortness of Breath and/or Wheezing  dextrose Oral Gel 15 Gram(s) Oral once PRN Blood Glucose LESS THAN 70 milliGRAM(s)/deciliter  haloperidol     Tablet 5 milliGRAM(s) Oral every 6 hours PRN agitation  haloperidol    Injectable 5 milliGRAM(s) IntraMuscular once PRN severe agitation  haloperidol    Injectable 5 milliGRAM(s) IntraMuscular once PRN Severe Agitation  hydrocortisone 2.5% Rectal Cream 1 Application(s) Rectal two times a day PRN hemorrhoids  LORazepam     Tablet 1 milliGRAM(s) Oral every 6 hours PRN agitation  LORazepam   Injectable 1 milliGRAM(s) IntraMuscular once PRN Severe agitation

## 2024-01-31 NOTE — BH INPATIENT PSYCHIATRY PROGRESS NOTE - NSBHMETABOLIC_PSY_ALL_CORE_FT
BMI: BMI (kg/m2): 30.2 (01-15-24 @ 12:52)  HbA1c: A1C with Estimated Average Glucose Result: 7.6 % (01-12-24 @ 12:40)    Glucose: POCT Blood Glucose.: 180 mg/dL (01-31-24 @ 11:54)    BP: 129/98 (01-30-24 @ 09:05) (129/98 - 129/98)Vital Signs Last 24 Hrs  T(C): --  T(F): --  HR: --  BP: --  BP(mean): --  RR: --  SpO2: --      Lipid Panel:

## 2024-02-01 LAB — GLUCOSE BLDC GLUCOMTR-MCNC: 241 MG/DL — HIGH (ref 70–99)

## 2024-02-01 PROCEDURE — 99232 SBSQ HOSP IP/OBS MODERATE 35: CPT

## 2024-02-01 RX ORDER — CLONAZEPAM 1 MG
0.5 TABLET ORAL
Refills: 0 | Status: DISCONTINUED | OUTPATIENT
Start: 2024-02-01 | End: 2024-02-07

## 2024-02-01 RX ORDER — QUETIAPINE FUMARATE 200 MG/1
125 TABLET, FILM COATED ORAL AT BEDTIME
Refills: 0 | Status: DISCONTINUED | OUTPATIENT
Start: 2024-02-01 | End: 2024-02-02

## 2024-02-01 RX ADMIN — DIVALPROEX SODIUM 500 MILLIGRAM(S): 500 TABLET, DELAYED RELEASE ORAL at 09:42

## 2024-02-01 RX ADMIN — Medication 50 MILLIGRAM(S): at 09:40

## 2024-02-01 RX ADMIN — QUETIAPINE FUMARATE 125 MILLIGRAM(S): 200 TABLET, FILM COATED ORAL at 21:14

## 2024-02-01 RX ADMIN — POLYETHYLENE GLYCOL 3350 17 GRAM(S): 17 POWDER, FOR SOLUTION ORAL at 09:41

## 2024-02-01 RX ADMIN — DIVALPROEX SODIUM 500 MILLIGRAM(S): 500 TABLET, DELAYED RELEASE ORAL at 16:40

## 2024-02-01 RX ADMIN — HALOPERIDOL DECANOATE 5 MILLIGRAM(S): 100 INJECTION INTRAMUSCULAR at 09:41

## 2024-02-01 RX ADMIN — CARBIDOPA AND LEVODOPA 1 TABLET(S): 25; 100 TABLET ORAL at 16:40

## 2024-02-01 RX ADMIN — CARBIDOPA AND LEVODOPA 1 TABLET(S): 25; 100 TABLET ORAL at 12:21

## 2024-02-01 RX ADMIN — HALOPERIDOL DECANOATE 5 MILLIGRAM(S): 100 INJECTION INTRAMUSCULAR at 12:21

## 2024-02-01 RX ADMIN — DIVALPROEX SODIUM 500 MILLIGRAM(S): 500 TABLET, DELAYED RELEASE ORAL at 12:20

## 2024-02-01 RX ADMIN — LISINOPRIL 20 MILLIGRAM(S): 2.5 TABLET ORAL at 09:40

## 2024-02-01 RX ADMIN — HALOPERIDOL DECANOATE 5 MILLIGRAM(S): 100 INJECTION INTRAMUSCULAR at 16:40

## 2024-02-01 RX ADMIN — VALACYCLOVIR 1000 MILLIGRAM(S): 500 TABLET, FILM COATED ORAL at 09:41

## 2024-02-01 RX ADMIN — Medication 0.5 MILLIGRAM(S): at 09:41

## 2024-02-01 RX ADMIN — SENNA PLUS 2 TABLET(S): 8.6 TABLET ORAL at 21:14

## 2024-02-01 RX ADMIN — METFORMIN HYDROCHLORIDE 500 MILLIGRAM(S): 850 TABLET ORAL at 21:14

## 2024-02-01 RX ADMIN — HALOPERIDOL DECANOATE 5 MILLIGRAM(S): 100 INJECTION INTRAMUSCULAR at 21:14

## 2024-02-01 RX ADMIN — METFORMIN HYDROCHLORIDE 500 MILLIGRAM(S): 850 TABLET ORAL at 09:40

## 2024-02-01 RX ADMIN — Medication 0.5 MILLIGRAM(S): at 16:40

## 2024-02-01 RX ADMIN — VALACYCLOVIR 1000 MILLIGRAM(S): 500 TABLET, FILM COATED ORAL at 21:14

## 2024-02-01 RX ADMIN — Medication 1 TABLET(S): at 09:41

## 2024-02-01 RX ADMIN — CARBIDOPA AND LEVODOPA 1 TABLET(S): 25; 100 TABLET ORAL at 09:41

## 2024-02-01 NOTE — BH INPATIENT PSYCHIATRY PROGRESS NOTE - NSBHCHARTREVIEWVS_PSY_A_CORE FT
Vital Signs Last 24 Hrs  T(C): --  T(F): --  HR: 84 (02-01-24 @ 09:32) (84 - 84)  BP: 138/94 (02-01-24 @ 09:32) (138/94 - 138/94)  BP(mean): --  RR: --  SpO2: --

## 2024-02-01 NOTE — BH INPATIENT PSYCHIATRY PROGRESS NOTE - NSBHMETABOLIC_PSY_ALL_CORE_FT
BMI: BMI (kg/m2): 30.2 (01-15-24 @ 12:52)  HbA1c: A1C with Estimated Average Glucose Result: 7.6 % (01-12-24 @ 12:40)    Glucose: POCT Blood Glucose.: 197 mg/dL (01-31-24 @ 20:41)    BP: 138/94 (02-01-24 @ 09:32) (129/98 - 138/94)Vital Signs Last 24 Hrs  T(C): --  T(F): --  HR: 84 (02-01-24 @ 09:32) (84 - 84)  BP: 138/94 (02-01-24 @ 09:32) (138/94 - 138/94)  BP(mean): --  RR: --  SpO2: --      Lipid Panel:

## 2024-02-01 NOTE — BH INPATIENT PSYCHIATRY PROGRESS NOTE - CURRENT MEDICATION
MEDICATIONS  (STANDING):  ammonium lactate 12% Lotion 1 Application(s) Topical two times a day  carbidopa/levodopa  25/100 1 Tablet(s) Oral <User Schedule>  clonazePAM Oral Disintegrating Tablet 0.5 milliGRAM(s) Oral <User Schedule>  dextrose 5%. 1000 milliLiter(s) (50 mL/Hr) IV Continuous <Continuous>  dextrose 5%. 1000 milliLiter(s) (100 mL/Hr) IV Continuous <Continuous>  dextrose 50% Injectable 25 Gram(s) IV Push once  dextrose 50% Injectable 25 Gram(s) IV Push once  dextrose 50% Injectable 12.5 Gram(s) IV Push once  divalproex Sprinkle 500 milliGRAM(s) Oral <User Schedule>  glucagon  Injectable 1 milliGRAM(s) IntraMuscular once  haloperidol     Tablet 5 milliGRAM(s) Oral <User Schedule>  hemorrhoidal Ointment 1 Application(s) Rectal daily  insulin lispro (ADMELOG) corrective regimen sliding scale   SubCutaneous three times a day before meals  levothyroxine 75 MICROGram(s) Oral daily  lisinopril 20 milliGRAM(s) Oral daily  metFORMIN 500 milliGRAM(s) Oral two times a day  metoprolol succinate ER 50 milliGRAM(s) Oral daily  multivitamin 1 Tablet(s) Oral <User Schedule>  polyethylene glycol 3350 17 Gram(s) Oral daily  QUEtiapine 125 milliGRAM(s) Oral at bedtime  senna 2 Tablet(s) Oral at bedtime  valACYclovir 1000 milliGRAM(s) Oral every 12 hours    MEDICATIONS  (PRN):  acetaminophen     Tablet .. 650 milliGRAM(s) Oral every 6 hours PRN Mild Pain (1 - 3)  albuterol    90 MICROgram(s) HFA Inhaler 2 Puff(s) Inhalation every 6 hours PRN Shortness of Breath and/or Wheezing  dextrose Oral Gel 15 Gram(s) Oral once PRN Blood Glucose LESS THAN 70 milliGRAM(s)/deciliter  haloperidol     Tablet 5 milliGRAM(s) Oral every 6 hours PRN agitation  haloperidol    Injectable 5 milliGRAM(s) IntraMuscular once PRN severe agitation  haloperidol    Injectable 5 milliGRAM(s) IntraMuscular once PRN Severe Agitation  hydrocortisone 2.5% Rectal Cream 1 Application(s) Rectal two times a day PRN hemorrhoids  LORazepam     Tablet 1 milliGRAM(s) Oral every 6 hours PRN agitation  LORazepam   Injectable 1 milliGRAM(s) IntraMuscular once PRN Severe agitation

## 2024-02-01 NOTE — BH INPATIENT PSYCHIATRY PROGRESS NOTE - NSBHASSESSSUMMFT_PSY_ALL_CORE
Detail Level: Simple Instructions: This plan will send the code FBSE to the PM system.  DO NOT or CHANGE the price. Price (Do Not Change): 0.00 - would consider Parkinson's Dementia as a diagnosis in this case more then Vascular Dementia  - consider switching out Seroquel to Zyprexa as Pt's symptoms and behaviors showed response to it when used PRN  - unclear what etiology the charted "seizure" history is   - UNCLEAR why patient is still on Valacyclovir 1000mg PO bid (was on it on admission as well) - medical notes do not mention this   1/18 Patient agitated labile aggressive. Spoke with NP Pal from facility. Patient was on Seroquel and haldol but haldol tapered then dced 10/23 due to EPS which may have eventually led to decompensation. She feels these epsiodes of illness are manic like c/w schizoaffective dx.  Patient compliant in facility most of time. Diet there was mild thickened fluid and soft food. Suspect patient has Parkinsonism not PD given tremor occurring on 10mg/d haldol. Seroquel not effective as monotherapy. Will change to olanzapine consider some reduction in Sinemet, will increase Depakote and change to Sprinkles and target blood level over 50 especially since she has sz disorder. Consider attempt to at least reduce klonopin  1/19 Patient agitated, psychotic, suspect manic component. Will increase po olanzapine but given poor response to IM olanzapine and lability to leverage synergistic use of BDZ will make haldol /Ativan the prn hopefully if olanzapine effective will see reduced use of haldol prn. Patient may if not responding to olanzpine go back to haldol which worked but had EPS issues. Will plan recheck VPA  level next week possibly increase Depakote. Ekg ordered by resident but refused. Given baseline Qtc of 428 feel safe to defer EKG til patient calmer  1/20: Agitated overnight, dancing, slamming newspaper on the floor and turning on lights on sleeping peer's rooms. Medicated with PRN Haldol 5mg/Ativan 1mg around 4:20AM. Irritable, confused and demanding during encounter, unable to engage meaningfully. Refused EKG yesterday. Ordered bloodwork for tomorrow in case pt is more emenable. Refusing vital signs.  1/21: remains impulsive, agitated requiring PRN meds, uncooperative, loud and easily agitated, refused labs but allowed vitals, noted stable.  1/22 Schizoaffective manic with hyper religiosity some sexual pre-occupation but not acting on it, increased motor activity. Will increase olanzapine and Depakote Will try to simplify regimen to maximize compliance. Will try to phase out haldol/Ativan when more stable. LAbs can be repeated when calmer, more cooperative.  1/23 Patient with psychosis, shabbir, remains agitated needing prns. Given need for frequent IMs of haldol and Ativan poor response to po and IM Zyprexa, hx of good response to haldol, unlikliness she has true Parkinsons disease will dc olanzapine and add haldol 5mg bid allowing for monotherapy treatment with med she has responded to in past Cont klonopin and Depakote   1/24 Remains manic perhaps  slightly better than admit despite receiving IMs freuently. Cont haldol and Depakote and prns. Will add MVI at patient request  1/25 Patient manic sl better at this moment after taking AM meds and receiving prn. Will do mouth checks to make sure she is taking her meds, will ask for dietary consult  1/26 Modest improvement tolerating meds with some tremor, highly doubt PD dx. Cont current meds consider increment in haldol and plan VPA level at steady state with regular compliance. Consider low dose li if not improving  1/27 Very modest gains, less prns. Cont meds plan blood level VPA, will add MVI  1/28 Remains manic poor sleep, agitation will add back small dose Seroquel as she used to be on 150mg/d. Perhaps use of this may allow for less need for haldol and this less tremor  1/29 Patient remains manic irritable, disorganized, intrusive, hypersexual. Has worsening of tremor no rigidity. Will increase Seroquel at hs as prefer to avoid much more use of haldol which  may worsen EPS. No evidence hand injury no need for imaging unless symptoms or exam changes  1/30 Some improvement since starting Seroquel back , will increase to 100mg will reduce dosing of prns  1/31 Patient manic intrusive , still needing frequent prns will cont to titrate Seroquel and cont haldol po  2/1 Patient remains unprectiable labile, disorganized. Plan increase Seroquel to 125mg hs

## 2024-02-01 NOTE — BH INPATIENT PSYCHIATRY PROGRESS NOTE - NSBHFUPINTERVALHXFT_PSY_A_CORE
Patient seen for schizoaffective disorder, chart reviewed. Patient received po prn overnight.   Refused vitals. Eating fair sleep fair . Got into verbal altercation with peer able to be redirected after prn was able to talk pleasantly to same peer. Sleep poor appetite good. Labs w/o severe abnormalities. Had PO prn today overall less eventful day so far

## 2024-02-02 LAB — GLUCOSE BLDC GLUCOMTR-MCNC: 146 MG/DL — HIGH (ref 70–99)

## 2024-02-02 PROCEDURE — 99232 SBSQ HOSP IP/OBS MODERATE 35: CPT

## 2024-02-02 RX ORDER — HYDROCORTISONE 1 %
1 OINTMENT (GRAM) TOPICAL
Refills: 0 | Status: DISCONTINUED | OUTPATIENT
Start: 2024-02-02 | End: 2024-05-17

## 2024-02-02 RX ORDER — QUETIAPINE FUMARATE 200 MG/1
150 TABLET, FILM COATED ORAL AT BEDTIME
Refills: 0 | Status: DISCONTINUED | OUTPATIENT
Start: 2024-02-02 | End: 2024-03-21

## 2024-02-02 RX ORDER — SOD,AMMONIUM,POTASSIUM LACTATE
1 CREAM (GRAM) TOPICAL
Refills: 0 | Status: DISCONTINUED | OUTPATIENT
Start: 2024-02-02 | End: 2024-05-17

## 2024-02-02 RX ADMIN — Medication 0.5 MILLIGRAM(S): at 09:55

## 2024-02-02 RX ADMIN — Medication 1 TABLET(S): at 09:56

## 2024-02-02 RX ADMIN — METFORMIN HYDROCHLORIDE 500 MILLIGRAM(S): 850 TABLET ORAL at 09:56

## 2024-02-02 RX ADMIN — Medication 1 APPLICATION(S): at 21:17

## 2024-02-02 RX ADMIN — Medication 75 MICROGRAM(S): at 06:05

## 2024-02-02 RX ADMIN — LISINOPRIL 20 MILLIGRAM(S): 2.5 TABLET ORAL at 09:56

## 2024-02-02 RX ADMIN — VALACYCLOVIR 1000 MILLIGRAM(S): 500 TABLET, FILM COATED ORAL at 21:17

## 2024-02-02 RX ADMIN — Medication 0.5 MILLIGRAM(S): at 17:13

## 2024-02-02 RX ADMIN — HALOPERIDOL DECANOATE 5 MILLIGRAM(S): 100 INJECTION INTRAMUSCULAR at 17:13

## 2024-02-02 RX ADMIN — METFORMIN HYDROCHLORIDE 500 MILLIGRAM(S): 850 TABLET ORAL at 21:16

## 2024-02-02 RX ADMIN — CARBIDOPA AND LEVODOPA 1 TABLET(S): 25; 100 TABLET ORAL at 13:18

## 2024-02-02 RX ADMIN — Medication 50 MILLIGRAM(S): at 09:58

## 2024-02-02 RX ADMIN — VALACYCLOVIR 1000 MILLIGRAM(S): 500 TABLET, FILM COATED ORAL at 09:58

## 2024-02-02 RX ADMIN — POLYETHYLENE GLYCOL 3350 17 GRAM(S): 17 POWDER, FOR SOLUTION ORAL at 09:57

## 2024-02-02 RX ADMIN — SENNA PLUS 2 TABLET(S): 8.6 TABLET ORAL at 21:16

## 2024-02-02 RX ADMIN — DIVALPROEX SODIUM 500 MILLIGRAM(S): 500 TABLET, DELAYED RELEASE ORAL at 09:55

## 2024-02-02 RX ADMIN — CARBIDOPA AND LEVODOPA 1 TABLET(S): 25; 100 TABLET ORAL at 17:13

## 2024-02-02 RX ADMIN — DIVALPROEX SODIUM 500 MILLIGRAM(S): 500 TABLET, DELAYED RELEASE ORAL at 17:13

## 2024-02-02 RX ADMIN — CARBIDOPA AND LEVODOPA 1 TABLET(S): 25; 100 TABLET ORAL at 09:55

## 2024-02-02 RX ADMIN — DIVALPROEX SODIUM 500 MILLIGRAM(S): 500 TABLET, DELAYED RELEASE ORAL at 13:18

## 2024-02-02 RX ADMIN — QUETIAPINE FUMARATE 150 MILLIGRAM(S): 200 TABLET, FILM COATED ORAL at 21:16

## 2024-02-02 RX ADMIN — HALOPERIDOL DECANOATE 5 MILLIGRAM(S): 100 INJECTION INTRAMUSCULAR at 09:55

## 2024-02-02 NOTE — BH INPATIENT PSYCHIATRY PROGRESS NOTE - NSBHCHARTREVIEWINVESTIGATE_PSY_A_CORE FT
CT HEAD 12/16 no acute intracranial hemorrhage, transcortical infarct, or mass effect.

## 2024-02-02 NOTE — BH INPATIENT PSYCHIATRY PROGRESS NOTE - NSBHCHARTREVIEWVS_PSY_A_CORE FT
Vital Signs Last 24 Hrs  T(C): --  T(F): --  HR: 86 (02-02-24 @ 09:01) (86 - 86)  BP: 152/72 (02-02-24 @ 09:01) (152/72 - 152/72)  BP(mean): --  RR: --  SpO2: --

## 2024-02-02 NOTE — BH INPATIENT PSYCHIATRY PROGRESS NOTE - NSBHFUPINTERVALHXFT_PSY_A_CORE
Patient seen for schizoaffective disorder, chart reviewed. Patient received po prn overnight.   VSS Eating fair sleep fair . Sleep poor appetite good. Labs w/o severe abnormalities.
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
## 2024-02-02 NOTE — ADVANCED PRACTICE NURSE CONSULT - RECOMMEDATIONS
Recommend resume Ammonium Lactate 12% topical lotion apply twice daily and Hydrocortisone 2.5% topical ointment twice daily.  Please help patient to gently wash her feet with bedside-care foam.  pat dry.  Apply ammonium 12% to feet bilaterally to help exfoliate dryness  Endorsed to team.

## 2024-02-02 NOTE — BH INPATIENT PSYCHIATRY PROGRESS NOTE - NSBHMETABOLIC_PSY_ALL_CORE_FT
BMI: BMI (kg/m2): 30.2 (01-15-24 @ 12:52)  HbA1c: A1C with Estimated Average Glucose Result: 7.6 % (01-12-24 @ 12:40)    Glucose: POCT Blood Glucose.: 146 mg/dL (02-02-24 @ 07:49)    BP: 152/72 (02-02-24 @ 09:01) (138/94 - 152/72)Vital Signs Last 24 Hrs  T(C): --  T(F): --  HR: 86 (02-02-24 @ 09:01) (86 - 86)  BP: 152/72 (02-02-24 @ 09:01) (152/72 - 152/72)  BP(mean): --  RR: --  SpO2: --      Lipid Panel:

## 2024-02-02 NOTE — ADVANCED PRACTICE NURSE CONSULT - ASSESSMENT
Patient is a 73 year old female, admitted to [facility] for inpatient psychiatry.  Patient reported that she had been treated for fungal infection prior admitting to hospital with two lotions.  On assessment patient noted to have moderate to severe abnormal dryness of her foot.  Characteristics include dry and scaly.  Toe nails with onychomycosis.   Educated patient to wash feet, pat dry and apply lotion daily.

## 2024-02-02 NOTE — BH INPATIENT PSYCHIATRY PROGRESS NOTE - NSBHASSESSSUMMFT_PSY_ALL_CORE
- would consider Parkinson's Dementia as a diagnosis in this case more then Vascular Dementia  - consider switching out Seroquel to Zyprexa as Pt's symptoms and behaviors showed response to it when used PRN  - unclear what etiology the charted "seizure" history is   - UNCLEAR why patient is still on Valacyclovir 1000mg PO bid (was on it on admission as well) - medical notes do not mention this   1/18 Patient agitated labile aggressive. Spoke with NP Pal from facility. Patient was on Seroquel and haldol but haldol tapered then dced 10/23 due to EPS which may have eventually led to decompensation. She feels these epsiodes of illness are manic like c/w schizoaffective dx.  Patient compliant in facility most of time. Diet there was mild thickened fluid and soft food. Suspect patient has Parkinsonism not PD given tremor occurring on 10mg/d haldol. Seroquel not effective as monotherapy. Will change to olanzapine consider some reduction in Sinemet, will increase Depakote and change to Sprinkles and target blood level over 50 especially since she has sz disorder. Consider attempt to at least reduce klonopin  1/19 Patient agitated, psychotic, suspect manic component. Will increase po olanzapine but given poor response to IM olanzapine and lability to leverage synergistic use of BDZ will make haldol /Ativan the prn hopefully if olanzapine effective will see reduced use of haldol prn. Patient may if not responding to olanzpine go back to haldol which worked but had EPS issues. Will plan recheck VPA  level next week possibly increase Depakote. Ekg ordered by resident but refused. Given baseline Qtc of 428 feel safe to defer EKG til patient calmer  1/20: Agitated overnight, dancing, slamming newspaper on the floor and turning on lights on sleeping peer's rooms. Medicated with PRN Haldol 5mg/Ativan 1mg around 4:20AM. Irritable, confused and demanding during encounter, unable to engage meaningfully. Refused EKG yesterday. Ordered bloodwork for tomorrow in case pt is more emenable. Refusing vital signs.  1/21: remains impulsive, agitated requiring PRN meds, uncooperative, loud and easily agitated, refused labs but allowed vitals, noted stable.  1/22 Schizoaffective manic with hyper religiosity some sexual pre-occupation but not acting on it, increased motor activity. Will increase olanzapine and Depakote Will try to simplify regimen to maximize compliance. Will try to phase out haldol/Ativan when more stable. LAbs can be repeated when calmer, more cooperative.  1/23 Patient with psychosis, shabbir, remains agitated needing prns. Given need for frequent IMs of haldol and Ativan poor response to po and IM Zyprexa, hx of good response to haldol, unlikliness she has true Parkinsons disease will dc olanzapine and add haldol 5mg bid allowing for monotherapy treatment with med she has responded to in past Cont klonopin and Depakote   1/24 Remains manic perhaps  slightly better than admit despite receiving IMs freuently. Cont haldol and Depakote and prns. Will add MVI at patient request  1/25 Patient manic sl better at this moment after taking AM meds and receiving prn. Will do mouth checks to make sure she is taking her meds, will ask for dietary consult  1/26 Modest improvement tolerating meds with some tremor, highly doubt PD dx. Cont current meds consider increment in haldol and plan VPA level at steady state with regular compliance. Consider low dose li if not improving  1/27 Very modest gains, less prns. Cont meds plan blood level VPA, will add MVI  1/28 Remains manic poor sleep, agitation will add back small dose Seroquel as she used to be on 150mg/d. Perhaps use of this may allow for less need for haldol and this less tremor  1/29 Patient remains manic irritable, disorganized, intrusive, hypersexual. Has worsening of tremor no rigidity. Will increase Seroquel at hs as prefer to avoid much more use of haldol which  may worsen EPS. No evidence hand injury no need for imaging unless symptoms or exam changes  1/30 Some improvement since starting Seroquel back , will increase to 100mg will reduce dosing of prns  1/31 Patient manic intrusive , still needing frequent prns will cont to titrate Seroquel and cont haldol po  2/1 Patient remains unprectiable labile, disorganized. Plan increase Seroquel to 125mg hs  2/2 Patient improved modestly appears to have pattern of response to haldol, quetiapine combo. Will increase Seroquel to 150mg hs

## 2024-02-02 NOTE — BH INPATIENT PSYCHIATRY PROGRESS NOTE - MSE UNSTRUCTURED FT
Pt appears stated age, awake,  alert, poor relatedness  though sl more able to engage her. Stable gait noted.  Speech hard to understand due to poor articulation and edentulous.Has tremor R more than L but no evidence of rigidity, walks well, no shuffling or festination.  Mood elevated affect labile appearing elevated or irritable but a little better No suicidal/homicidal ideas/plan expressed. Focused on diet believes she is getting puree despite being on soft Thinking disjointed. Cannot assess cognition. Poor insight and judgement, is impulsive.

## 2024-02-02 NOTE — BH INPATIENT PSYCHIATRY PROGRESS NOTE - CURRENT MEDICATION
MEDICATIONS  (STANDING):  ammonium lactate 12% Lotion 1 Application(s) Topical two times a day  carbidopa/levodopa  25/100 1 Tablet(s) Oral <User Schedule>  clonazePAM Oral Disintegrating Tablet 0.5 milliGRAM(s) Oral <User Schedule>  dextrose 5%. 1000 milliLiter(s) (100 mL/Hr) IV Continuous <Continuous>  dextrose 5%. 1000 milliLiter(s) (50 mL/Hr) IV Continuous <Continuous>  dextrose 50% Injectable 25 Gram(s) IV Push once  dextrose 50% Injectable 25 Gram(s) IV Push once  dextrose 50% Injectable 12.5 Gram(s) IV Push once  divalproex Sprinkle 500 milliGRAM(s) Oral <User Schedule>  glucagon  Injectable 1 milliGRAM(s) IntraMuscular once  haloperidol     Tablet 5 milliGRAM(s) Oral <User Schedule>  hemorrhoidal Ointment 1 Application(s) Rectal daily  insulin lispro (ADMELOG) corrective regimen sliding scale   SubCutaneous three times a day before meals  levothyroxine 75 MICROGram(s) Oral daily  lisinopril 20 milliGRAM(s) Oral daily  metFORMIN 500 milliGRAM(s) Oral two times a day  metoprolol succinate ER 50 milliGRAM(s) Oral daily  multivitamin 1 Tablet(s) Oral <User Schedule>  polyethylene glycol 3350 17 Gram(s) Oral daily  QUEtiapine 150 milliGRAM(s) Oral at bedtime  senna 2 Tablet(s) Oral at bedtime  valACYclovir 1000 milliGRAM(s) Oral every 12 hours    MEDICATIONS  (PRN):  acetaminophen     Tablet .. 650 milliGRAM(s) Oral every 6 hours PRN Mild Pain (1 - 3)  albuterol    90 MICROgram(s) HFA Inhaler 2 Puff(s) Inhalation every 6 hours PRN Shortness of Breath and/or Wheezing  dextrose Oral Gel 15 Gram(s) Oral once PRN Blood Glucose LESS THAN 70 milliGRAM(s)/deciliter  haloperidol     Tablet 5 milliGRAM(s) Oral every 6 hours PRN agitation  haloperidol    Injectable 5 milliGRAM(s) IntraMuscular once PRN severe agitation  haloperidol    Injectable 5 milliGRAM(s) IntraMuscular once PRN Severe Agitation  hydrocortisone 2.5% Rectal Cream 1 Application(s) Rectal two times a day PRN hemorrhoids  LORazepam     Tablet 1 milliGRAM(s) Oral every 6 hours PRN agitation  LORazepam   Injectable 1 milliGRAM(s) IntraMuscular once PRN Severe agitation

## 2024-02-03 LAB
GLUCOSE BLDC GLUCOMTR-MCNC: 139 MG/DL — HIGH (ref 70–99)
GLUCOSE BLDC GLUCOMTR-MCNC: 141 MG/DL — HIGH (ref 70–99)
GLUCOSE BLDC GLUCOMTR-MCNC: 157 MG/DL — HIGH (ref 70–99)

## 2024-02-03 PROCEDURE — 99231 SBSQ HOSP IP/OBS SF/LOW 25: CPT

## 2024-02-03 RX ADMIN — METFORMIN HYDROCHLORIDE 500 MILLIGRAM(S): 850 TABLET ORAL at 20:21

## 2024-02-03 RX ADMIN — Medication 75 MICROGRAM(S): at 05:34

## 2024-02-03 RX ADMIN — Medication 1: at 17:34

## 2024-02-03 RX ADMIN — QUETIAPINE FUMARATE 150 MILLIGRAM(S): 200 TABLET, FILM COATED ORAL at 20:21

## 2024-02-03 RX ADMIN — CARBIDOPA AND LEVODOPA 1 TABLET(S): 25; 100 TABLET ORAL at 12:57

## 2024-02-03 RX ADMIN — VALACYCLOVIR 1000 MILLIGRAM(S): 500 TABLET, FILM COATED ORAL at 20:21

## 2024-02-03 RX ADMIN — DIVALPROEX SODIUM 500 MILLIGRAM(S): 500 TABLET, DELAYED RELEASE ORAL at 12:57

## 2024-02-03 RX ADMIN — VALACYCLOVIR 1000 MILLIGRAM(S): 500 TABLET, FILM COATED ORAL at 09:29

## 2024-02-03 RX ADMIN — Medication 0.5 MILLIGRAM(S): at 09:29

## 2024-02-03 RX ADMIN — Medication 1 TABLET(S): at 09:30

## 2024-02-03 RX ADMIN — DIVALPROEX SODIUM 500 MILLIGRAM(S): 500 TABLET, DELAYED RELEASE ORAL at 17:35

## 2024-02-03 RX ADMIN — CARBIDOPA AND LEVODOPA 1 TABLET(S): 25; 100 TABLET ORAL at 09:29

## 2024-02-03 RX ADMIN — Medication 0.5 MILLIGRAM(S): at 17:35

## 2024-02-03 RX ADMIN — METFORMIN HYDROCHLORIDE 500 MILLIGRAM(S): 850 TABLET ORAL at 09:30

## 2024-02-03 RX ADMIN — HALOPERIDOL DECANOATE 5 MILLIGRAM(S): 100 INJECTION INTRAMUSCULAR at 04:34

## 2024-02-03 RX ADMIN — Medication 1 MILLIGRAM(S): at 04:34

## 2024-02-03 RX ADMIN — Medication 50 MILLIGRAM(S): at 09:30

## 2024-02-03 RX ADMIN — CARBIDOPA AND LEVODOPA 1 TABLET(S): 25; 100 TABLET ORAL at 17:35

## 2024-02-03 RX ADMIN — SENNA PLUS 2 TABLET(S): 8.6 TABLET ORAL at 20:20

## 2024-02-03 RX ADMIN — HALOPERIDOL DECANOATE 5 MILLIGRAM(S): 100 INJECTION INTRAMUSCULAR at 09:31

## 2024-02-03 RX ADMIN — DIVALPROEX SODIUM 500 MILLIGRAM(S): 500 TABLET, DELAYED RELEASE ORAL at 09:29

## 2024-02-03 RX ADMIN — HALOPERIDOL DECANOATE 5 MILLIGRAM(S): 100 INJECTION INTRAMUSCULAR at 17:35

## 2024-02-03 RX ADMIN — LISINOPRIL 20 MILLIGRAM(S): 2.5 TABLET ORAL at 09:31

## 2024-02-03 RX ADMIN — POLYETHYLENE GLYCOL 3350 17 GRAM(S): 17 POWDER, FOR SOLUTION ORAL at 09:29

## 2024-02-03 NOTE — BH INPATIENT PSYCHIATRY PROGRESS NOTE - CURRENT MEDICATION
MEDICATIONS  (STANDING):  ammonium lactate 12% Lotion 1 Application(s) Topical two times a day  carbidopa/levodopa  25/100 1 Tablet(s) Oral <User Schedule>  clonazePAM Oral Disintegrating Tablet 0.5 milliGRAM(s) Oral <User Schedule>  dextrose 5%. 1000 milliLiter(s) (50 mL/Hr) IV Continuous <Continuous>  dextrose 5%. 1000 milliLiter(s) (100 mL/Hr) IV Continuous <Continuous>  dextrose 50% Injectable 25 Gram(s) IV Push once  dextrose 50% Injectable 12.5 Gram(s) IV Push once  dextrose 50% Injectable 25 Gram(s) IV Push once  divalproex Sprinkle 500 milliGRAM(s) Oral <User Schedule>  glucagon  Injectable 1 milliGRAM(s) IntraMuscular once  haloperidol     Tablet 5 milliGRAM(s) Oral <User Schedule>  hemorrhoidal Ointment 1 Application(s) Rectal daily  hydrocortisone 2.5% Ointment 1 Application(s) Topical two times a day  insulin lispro (ADMELOG) corrective regimen sliding scale   SubCutaneous three times a day before meals  levothyroxine 75 MICROGram(s) Oral daily  lisinopril 20 milliGRAM(s) Oral daily  metFORMIN 500 milliGRAM(s) Oral two times a day  metoprolol succinate ER 50 milliGRAM(s) Oral daily  multivitamin 1 Tablet(s) Oral <User Schedule>  polyethylene glycol 3350 17 Gram(s) Oral daily  QUEtiapine 150 milliGRAM(s) Oral at bedtime  senna 2 Tablet(s) Oral at bedtime  valACYclovir 1000 milliGRAM(s) Oral every 12 hours    MEDICATIONS  (PRN):  acetaminophen     Tablet .. 650 milliGRAM(s) Oral every 6 hours PRN Mild Pain (1 - 3)  albuterol    90 MICROgram(s) HFA Inhaler 2 Puff(s) Inhalation every 6 hours PRN Shortness of Breath and/or Wheezing  dextrose Oral Gel 15 Gram(s) Oral once PRN Blood Glucose LESS THAN 70 milliGRAM(s)/deciliter  haloperidol     Tablet 5 milliGRAM(s) Oral every 6 hours PRN agitation  haloperidol    Injectable 5 milliGRAM(s) IntraMuscular once PRN severe agitation  haloperidol    Injectable 5 milliGRAM(s) IntraMuscular once PRN Severe Agitation  hydrocortisone 2.5% Rectal Cream 1 Application(s) Rectal two times a day PRN hemorrhoids  LORazepam     Tablet 1 milliGRAM(s) Oral every 6 hours PRN agitation  LORazepam   Injectable 1 milliGRAM(s) IntraMuscular once PRN Severe agitation

## 2024-02-03 NOTE — BH INPATIENT PSYCHIATRY PROGRESS NOTE - NSBHMETABOLIC_PSY_ALL_CORE_FT
BMI: BMI (kg/m2): 30.2 (01-15-24 @ 12:52)  HbA1c: A1C with Estimated Average Glucose Result: 7.6 % (01-12-24 @ 12:40)    Glucose: POCT Blood Glucose.: 141 mg/dL (02-03-24 @ 07:50)    BP: 152/72 (02-02-24 @ 09:01) (138/94 - 152/72)Vital Signs Last 24 Hrs  T(C): 36.1 (02-03-24 @ 08:06), Max: 36.1 (02-03-24 @ 08:06)  T(F): 97 (02-03-24 @ 08:06), Max: 97 (02-03-24 @ 08:06)  HR: --  BP: --  BP(mean): --  RR: --  SpO2: --    Orthostatic VS  02-03-24 @ 08:06  Lying BP: --/-- HR: --  Sitting BP: 142/70 HR: 69  Standing BP: 130/76 HR: 71  Site: upper left arm  Mode: electronic    Lipid Panel:

## 2024-02-03 NOTE — BH INPATIENT PSYCHIATRY PROGRESS NOTE - NSBHCHARTREVIEWVS_PSY_A_CORE FT
Vital Signs Last 24 Hrs  T(C): 36.1 (02-03-24 @ 08:06), Max: 36.1 (02-03-24 @ 08:06)  T(F): 97 (02-03-24 @ 08:06), Max: 97 (02-03-24 @ 08:06)  HR: --  BP: --  BP(mean): --  RR: --  SpO2: --    Orthostatic VS  02-03-24 @ 08:06  Lying BP: --/-- HR: --  Sitting BP: 142/70 HR: 69  Standing BP: 130/76 HR: 71  Site: upper left arm  Mode: electronic

## 2024-02-04 PROCEDURE — 99232 SBSQ HOSP IP/OBS MODERATE 35: CPT

## 2024-02-04 RX ADMIN — METFORMIN HYDROCHLORIDE 500 MILLIGRAM(S): 850 TABLET ORAL at 10:04

## 2024-02-04 RX ADMIN — SENNA PLUS 2 TABLET(S): 8.6 TABLET ORAL at 20:37

## 2024-02-04 RX ADMIN — HALOPERIDOL DECANOATE 5 MILLIGRAM(S): 100 INJECTION INTRAMUSCULAR at 10:05

## 2024-02-04 RX ADMIN — POLYETHYLENE GLYCOL 3350 17 GRAM(S): 17 POWDER, FOR SOLUTION ORAL at 10:05

## 2024-02-04 RX ADMIN — DIVALPROEX SODIUM 500 MILLIGRAM(S): 500 TABLET, DELAYED RELEASE ORAL at 17:49

## 2024-02-04 RX ADMIN — VALACYCLOVIR 1000 MILLIGRAM(S): 500 TABLET, FILM COATED ORAL at 20:37

## 2024-02-04 RX ADMIN — METFORMIN HYDROCHLORIDE 500 MILLIGRAM(S): 850 TABLET ORAL at 20:37

## 2024-02-04 RX ADMIN — CARBIDOPA AND LEVODOPA 1 TABLET(S): 25; 100 TABLET ORAL at 10:04

## 2024-02-04 RX ADMIN — QUETIAPINE FUMARATE 150 MILLIGRAM(S): 200 TABLET, FILM COATED ORAL at 20:37

## 2024-02-04 RX ADMIN — LISINOPRIL 20 MILLIGRAM(S): 2.5 TABLET ORAL at 10:05

## 2024-02-04 RX ADMIN — CARBIDOPA AND LEVODOPA 1 TABLET(S): 25; 100 TABLET ORAL at 13:23

## 2024-02-04 RX ADMIN — HALOPERIDOL DECANOATE 5 MILLIGRAM(S): 100 INJECTION INTRAMUSCULAR at 17:49

## 2024-02-04 RX ADMIN — Medication 0.5 MILLIGRAM(S): at 17:35

## 2024-02-04 RX ADMIN — Medication 50 MILLIGRAM(S): at 10:04

## 2024-02-04 RX ADMIN — DIVALPROEX SODIUM 500 MILLIGRAM(S): 500 TABLET, DELAYED RELEASE ORAL at 13:23

## 2024-02-04 RX ADMIN — CARBIDOPA AND LEVODOPA 1 TABLET(S): 25; 100 TABLET ORAL at 17:49

## 2024-02-04 RX ADMIN — Medication 1 TABLET(S): at 10:05

## 2024-02-04 RX ADMIN — DIVALPROEX SODIUM 500 MILLIGRAM(S): 500 TABLET, DELAYED RELEASE ORAL at 10:05

## 2024-02-04 RX ADMIN — VALACYCLOVIR 1000 MILLIGRAM(S): 500 TABLET, FILM COATED ORAL at 10:04

## 2024-02-04 RX ADMIN — Medication 0.5 MILLIGRAM(S): at 10:04

## 2024-02-04 NOTE — BH INPATIENT PSYCHIATRY PROGRESS NOTE - PRN MEDS
MEDICATIONS  (PRN):  acetaminophen     Tablet .. 650 milliGRAM(s) Oral every 6 hours PRN Mild Pain (1 - 3)  albuterol    90 MICROgram(s) HFA Inhaler 2 Puff(s) Inhalation every 6 hours PRN Shortness of Breath and/or Wheezing  dextrose Oral Gel 15 Gram(s) Oral once PRN Blood Glucose LESS THAN 70 milliGRAM(s)/deciliter  haloperidol     Tablet 5 milliGRAM(s) Oral every 6 hours PRN agitation  haloperidol    Injectable 5 milliGRAM(s) IntraMuscular once PRN severe agitation  haloperidol    Injectable 5 milliGRAM(s) IntraMuscular once PRN Severe Agitation  hydrocortisone 2.5% Rectal Cream 1 Application(s) Rectal two times a day PRN hemorrhoids  LORazepam     Tablet 1 milliGRAM(s) Oral every 6 hours PRN agitation  LORazepam   Injectable 1 milliGRAM(s) IntraMuscular once PRN Severe agitation   MEDICATIONS  (PRN):  acetaminophen     Tablet .. 650 milliGRAM(s) Oral every 6 hours PRN Mild Pain (1 - 3)  albuterol    90 MICROgram(s) HFA Inhaler 2 Puff(s) Inhalation every 6 hours PRN Shortness of Breath and/or Wheezing  dextrose Oral Gel 15 Gram(s) Oral once PRN Blood Glucose LESS THAN 70 milliGRAM(s)/deciliter  haloperidol     Tablet 5 milliGRAM(s) Oral every 6 hours PRN agitation  haloperidol    Injectable 5 milliGRAM(s) IntraMuscular once PRN Severe Agitation  haloperidol    Injectable 5 milliGRAM(s) IntraMuscular once PRN severe agitation  hydrocortisone 2.5% Rectal Cream 1 Application(s) Rectal two times a day PRN hemorrhoids  LORazepam     Tablet 1 milliGRAM(s) Oral every 6 hours PRN agitation  LORazepam   Injectable 1 milliGRAM(s) IntraMuscular once PRN Severe agitation

## 2024-02-04 NOTE — BH INPATIENT PSYCHIATRY PROGRESS NOTE - CURRENT MEDICATION
MEDICATIONS  (STANDING):  ammonium lactate 12% Lotion 1 Application(s) Topical two times a day  carbidopa/levodopa  25/100 1 Tablet(s) Oral <User Schedule>  clonazePAM Oral Disintegrating Tablet 0.5 milliGRAM(s) Oral <User Schedule>  dextrose 5%. 1000 milliLiter(s) (100 mL/Hr) IV Continuous <Continuous>  dextrose 5%. 1000 milliLiter(s) (50 mL/Hr) IV Continuous <Continuous>  dextrose 50% Injectable 25 Gram(s) IV Push once  dextrose 50% Injectable 12.5 Gram(s) IV Push once  dextrose 50% Injectable 25 Gram(s) IV Push once  divalproex Sprinkle 500 milliGRAM(s) Oral <User Schedule>  glucagon  Injectable 1 milliGRAM(s) IntraMuscular once  haloperidol     Tablet 5 milliGRAM(s) Oral <User Schedule>  hemorrhoidal Ointment 1 Application(s) Rectal daily  hydrocortisone 2.5% Ointment 1 Application(s) Topical two times a day  insulin lispro (ADMELOG) corrective regimen sliding scale   SubCutaneous three times a day before meals  levothyroxine 75 MICROGram(s) Oral daily  lisinopril 20 milliGRAM(s) Oral daily  metFORMIN 500 milliGRAM(s) Oral two times a day  metoprolol succinate ER 50 milliGRAM(s) Oral daily  multivitamin 1 Tablet(s) Oral <User Schedule>  polyethylene glycol 3350 17 Gram(s) Oral daily  QUEtiapine 150 milliGRAM(s) Oral at bedtime  senna 2 Tablet(s) Oral at bedtime  valACYclovir 1000 milliGRAM(s) Oral every 12 hours    MEDICATIONS  (PRN):  acetaminophen     Tablet .. 650 milliGRAM(s) Oral every 6 hours PRN Mild Pain (1 - 3)  albuterol    90 MICROgram(s) HFA Inhaler 2 Puff(s) Inhalation every 6 hours PRN Shortness of Breath and/or Wheezing  dextrose Oral Gel 15 Gram(s) Oral once PRN Blood Glucose LESS THAN 70 milliGRAM(s)/deciliter  haloperidol     Tablet 5 milliGRAM(s) Oral every 6 hours PRN agitation  haloperidol    Injectable 5 milliGRAM(s) IntraMuscular once PRN severe agitation  haloperidol    Injectable 5 milliGRAM(s) IntraMuscular once PRN Severe Agitation  hydrocortisone 2.5% Rectal Cream 1 Application(s) Rectal two times a day PRN hemorrhoids  LORazepam     Tablet 1 milliGRAM(s) Oral every 6 hours PRN agitation  LORazepam   Injectable 1 milliGRAM(s) IntraMuscular once PRN Severe agitation   MEDICATIONS  (STANDING):  ammonium lactate 12% Lotion 1 Application(s) Topical two times a day  carbidopa/levodopa  25/100 1 Tablet(s) Oral <User Schedule>  clonazePAM Oral Disintegrating Tablet 0.5 milliGRAM(s) Oral <User Schedule>  dextrose 5%. 1000 milliLiter(s) (50 mL/Hr) IV Continuous <Continuous>  dextrose 5%. 1000 milliLiter(s) (100 mL/Hr) IV Continuous <Continuous>  dextrose 50% Injectable 25 Gram(s) IV Push once  dextrose 50% Injectable 12.5 Gram(s) IV Push once  dextrose 50% Injectable 25 Gram(s) IV Push once  divalproex Sprinkle 500 milliGRAM(s) Oral <User Schedule>  glucagon  Injectable 1 milliGRAM(s) IntraMuscular once  haloperidol     Tablet 5 milliGRAM(s) Oral <User Schedule>  hemorrhoidal Ointment 1 Application(s) Rectal daily  hydrocortisone 2.5% Ointment 1 Application(s) Topical two times a day  insulin lispro (ADMELOG) corrective regimen sliding scale   SubCutaneous three times a day before meals  levothyroxine 75 MICROGram(s) Oral daily  lisinopril 20 milliGRAM(s) Oral daily  metFORMIN 500 milliGRAM(s) Oral two times a day  metoprolol succinate ER 50 milliGRAM(s) Oral daily  multivitamin 1 Tablet(s) Oral <User Schedule>  polyethylene glycol 3350 17 Gram(s) Oral daily  QUEtiapine 150 milliGRAM(s) Oral at bedtime  senna 2 Tablet(s) Oral at bedtime  valACYclovir 1000 milliGRAM(s) Oral every 12 hours    MEDICATIONS  (PRN):  acetaminophen     Tablet .. 650 milliGRAM(s) Oral every 6 hours PRN Mild Pain (1 - 3)  albuterol    90 MICROgram(s) HFA Inhaler 2 Puff(s) Inhalation every 6 hours PRN Shortness of Breath and/or Wheezing  dextrose Oral Gel 15 Gram(s) Oral once PRN Blood Glucose LESS THAN 70 milliGRAM(s)/deciliter  haloperidol     Tablet 5 milliGRAM(s) Oral every 6 hours PRN agitation  haloperidol    Injectable 5 milliGRAM(s) IntraMuscular once PRN Severe Agitation  haloperidol    Injectable 5 milliGRAM(s) IntraMuscular once PRN severe agitation  hydrocortisone 2.5% Rectal Cream 1 Application(s) Rectal two times a day PRN hemorrhoids  LORazepam     Tablet 1 milliGRAM(s) Oral every 6 hours PRN agitation  LORazepam   Injectable 1 milliGRAM(s) IntraMuscular once PRN Severe agitation

## 2024-02-04 NOTE — BH INPATIENT PSYCHIATRY PROGRESS NOTE - NSBHCHARTREVIEWVS_PSY_A_CORE FT
Vital Signs Last 24 Hrs  T(C): --  T(F): --  HR: --  BP: --  BP(mean): --  RR: --  SpO2: --    Orthostatic VS  02-03-24 @ 08:06  Lying BP: --/-- HR: --  Sitting BP: 142/70 HR: 69  Standing BP: 130/76 HR: 71  Site: upper left arm  Mode: electronic

## 2024-02-04 NOTE — BH INPATIENT PSYCHIATRY PROGRESS NOTE - NSBHMETABOLIC_PSY_ALL_CORE_FT
BMI: BMI (kg/m2): 30.2 (01-15-24 @ 12:52)  HbA1c: A1C with Estimated Average Glucose Result: 7.6 % (01-12-24 @ 12:40)    Glucose: POCT Blood Glucose.: 157 mg/dL (02-03-24 @ 16:47)    BP: 152/72 (02-02-24 @ 09:01) (138/94 - 152/72)Vital Signs Last 24 Hrs  T(C): --  T(F): --  HR: --  BP: --  BP(mean): --  RR: --  SpO2: --    Orthostatic VS  02-03-24 @ 08:06  Lying BP: --/-- HR: --  Sitting BP: 142/70 HR: 69  Standing BP: 130/76 HR: 71  Site: upper left arm  Mode: electronic    Lipid Panel:

## 2024-02-05 LAB
GLUCOSE BLDC GLUCOMTR-MCNC: 141 MG/DL — HIGH (ref 70–99)
GLUCOSE BLDC GLUCOMTR-MCNC: 190 MG/DL — HIGH (ref 70–99)

## 2024-02-05 PROCEDURE — 99232 SBSQ HOSP IP/OBS MODERATE 35: CPT

## 2024-02-05 RX ADMIN — Medication 50 MILLIGRAM(S): at 08:19

## 2024-02-05 RX ADMIN — HALOPERIDOL DECANOATE 5 MILLIGRAM(S): 100 INJECTION INTRAMUSCULAR at 17:22

## 2024-02-05 RX ADMIN — DIVALPROEX SODIUM 500 MILLIGRAM(S): 500 TABLET, DELAYED RELEASE ORAL at 17:22

## 2024-02-05 RX ADMIN — Medication 1 TABLET(S): at 08:19

## 2024-02-05 RX ADMIN — Medication 0.5 MILLIGRAM(S): at 08:19

## 2024-02-05 RX ADMIN — METFORMIN HYDROCHLORIDE 500 MILLIGRAM(S): 850 TABLET ORAL at 08:19

## 2024-02-05 RX ADMIN — METFORMIN HYDROCHLORIDE 500 MILLIGRAM(S): 850 TABLET ORAL at 22:39

## 2024-02-05 RX ADMIN — QUETIAPINE FUMARATE 150 MILLIGRAM(S): 200 TABLET, FILM COATED ORAL at 22:43

## 2024-02-05 RX ADMIN — CARBIDOPA AND LEVODOPA 1 TABLET(S): 25; 100 TABLET ORAL at 08:21

## 2024-02-05 RX ADMIN — DIVALPROEX SODIUM 500 MILLIGRAM(S): 500 TABLET, DELAYED RELEASE ORAL at 15:05

## 2024-02-05 RX ADMIN — LISINOPRIL 20 MILLIGRAM(S): 2.5 TABLET ORAL at 08:19

## 2024-02-05 RX ADMIN — HALOPERIDOL DECANOATE 5 MILLIGRAM(S): 100 INJECTION INTRAMUSCULAR at 08:19

## 2024-02-05 RX ADMIN — DIVALPROEX SODIUM 500 MILLIGRAM(S): 500 TABLET, DELAYED RELEASE ORAL at 08:18

## 2024-02-05 RX ADMIN — CARBIDOPA AND LEVODOPA 1 TABLET(S): 25; 100 TABLET ORAL at 15:05

## 2024-02-05 RX ADMIN — SENNA PLUS 2 TABLET(S): 8.6 TABLET ORAL at 22:43

## 2024-02-05 RX ADMIN — POLYETHYLENE GLYCOL 3350 17 GRAM(S): 17 POWDER, FOR SOLUTION ORAL at 08:19

## 2024-02-05 RX ADMIN — VALACYCLOVIR 1000 MILLIGRAM(S): 500 TABLET, FILM COATED ORAL at 22:43

## 2024-02-05 RX ADMIN — CARBIDOPA AND LEVODOPA 1 TABLET(S): 25; 100 TABLET ORAL at 17:22

## 2024-02-05 RX ADMIN — VALACYCLOVIR 1000 MILLIGRAM(S): 500 TABLET, FILM COATED ORAL at 08:19

## 2024-02-05 NOTE — BH INPATIENT PSYCHIATRY PROGRESS NOTE - PRN MEDS
MEDICATIONS  (PRN):  acetaminophen     Tablet .. 650 milliGRAM(s) Oral every 6 hours PRN Mild Pain (1 - 3)  albuterol    90 MICROgram(s) HFA Inhaler 2 Puff(s) Inhalation every 6 hours PRN Shortness of Breath and/or Wheezing  aluminum hydroxide/magnesium hydroxide/simethicone Suspension 30 milliLiter(s) Oral every 6 hours PRN Dyspepsia  dextrose Oral Gel 15 Gram(s) Oral once PRN Blood Glucose LESS THAN 70 milliGRAM(s)/deciliter  haloperidol     Tablet 5 milliGRAM(s) Oral every 6 hours PRN agitation  haloperidol    Injectable 5 milliGRAM(s) IntraMuscular once PRN severe agitation  haloperidol    Injectable 5 milliGRAM(s) IntraMuscular once PRN Severe Agitation  hydrocortisone 2.5% Rectal Cream 1 Application(s) Rectal two times a day PRN hemorrhoids  LORazepam     Tablet 1 milliGRAM(s) Oral every 6 hours PRN agitation  LORazepam   Injectable 1 milliGRAM(s) IntraMuscular once PRN Severe agitation

## 2024-02-05 NOTE — BH INPATIENT PSYCHIATRY PROGRESS NOTE - CURRENT MEDICATION
MEDICATIONS  (STANDING):  ammonium lactate 12% Lotion 1 Application(s) Topical two times a day  carbidopa/levodopa  25/100 1 Tablet(s) Oral <User Schedule>  clonazePAM Oral Disintegrating Tablet 0.5 milliGRAM(s) Oral <User Schedule>  dextrose 5%. 1000 milliLiter(s) (50 mL/Hr) IV Continuous <Continuous>  dextrose 5%. 1000 milliLiter(s) (100 mL/Hr) IV Continuous <Continuous>  dextrose 50% Injectable 25 Gram(s) IV Push once  dextrose 50% Injectable 12.5 Gram(s) IV Push once  dextrose 50% Injectable 25 Gram(s) IV Push once  divalproex Sprinkle 500 milliGRAM(s) Oral <User Schedule>  glucagon  Injectable 1 milliGRAM(s) IntraMuscular once  haloperidol     Tablet 5 milliGRAM(s) Oral <User Schedule>  hemorrhoidal Ointment 1 Application(s) Rectal daily  hydrocortisone 2.5% Ointment 1 Application(s) Topical two times a day  insulin lispro (ADMELOG) corrective regimen sliding scale   SubCutaneous three times a day before meals  levothyroxine 75 MICROGram(s) Oral daily  lisinopril 20 milliGRAM(s) Oral daily  metFORMIN 500 milliGRAM(s) Oral two times a day  metoprolol succinate ER 50 milliGRAM(s) Oral daily  multivitamin 1 Tablet(s) Oral <User Schedule>  polyethylene glycol 3350 17 Gram(s) Oral daily  QUEtiapine 150 milliGRAM(s) Oral at bedtime  senna 2 Tablet(s) Oral at bedtime  valACYclovir 1000 milliGRAM(s) Oral every 12 hours    MEDICATIONS  (PRN):  acetaminophen     Tablet .. 650 milliGRAM(s) Oral every 6 hours PRN Mild Pain (1 - 3)  albuterol    90 MICROgram(s) HFA Inhaler 2 Puff(s) Inhalation every 6 hours PRN Shortness of Breath and/or Wheezing  aluminum hydroxide/magnesium hydroxide/simethicone Suspension 30 milliLiter(s) Oral every 6 hours PRN Dyspepsia  dextrose Oral Gel 15 Gram(s) Oral once PRN Blood Glucose LESS THAN 70 milliGRAM(s)/deciliter  haloperidol     Tablet 5 milliGRAM(s) Oral every 6 hours PRN agitation  haloperidol    Injectable 5 milliGRAM(s) IntraMuscular once PRN severe agitation  haloperidol    Injectable 5 milliGRAM(s) IntraMuscular once PRN Severe Agitation  hydrocortisone 2.5% Rectal Cream 1 Application(s) Rectal two times a day PRN hemorrhoids  LORazepam     Tablet 1 milliGRAM(s) Oral every 6 hours PRN agitation  LORazepam   Injectable 1 milliGRAM(s) IntraMuscular once PRN Severe agitation

## 2024-02-05 NOTE — BH INPATIENT PSYCHIATRY PROGRESS NOTE - NSBHASSESSSUMMFT_PSY_ALL_CORE
2/5 PAtient showing some improving trend will give more time, if needs more medication titration due to ongoing symptoms will increase Seroquel not haldol

## 2024-02-05 NOTE — BH INPATIENT PSYCHIATRY PROGRESS NOTE - NSBHFUPINTERVALHXFT_PSY_A_CORE
Patient seen for schizoaffective. No major overnight events. Some trend towards  decreased need for IM. Eating, okay sleep fair. Compliant with meds.

## 2024-02-05 NOTE — BH INPATIENT PSYCHIATRY PROGRESS NOTE - NSBHMETABOLIC_PSY_ALL_CORE_FT
BMI: BMI (kg/m2): 30.2 (01-15-24 @ 12:52)  HbA1c: A1C with Estimated Average Glucose Result: 7.6 % (01-12-24 @ 12:40)    Glucose: POCT Blood Glucose.: 141 mg/dL (02-05-24 @ 11:19)    BP: 126/68 (02-04-24 @ 09:53) (126/68 - 126/68)Vital Signs Last 24 Hrs  T(C): --  T(F): --  HR: --  BP: --  BP(mean): --  RR: --  SpO2: --      Lipid Panel:

## 2024-02-06 LAB
GLUCOSE BLDC GLUCOMTR-MCNC: 116 MG/DL — HIGH (ref 70–99)
GLUCOSE BLDC GLUCOMTR-MCNC: 170 MG/DL — HIGH (ref 70–99)
GLUCOSE BLDC GLUCOMTR-MCNC: 199 MG/DL — HIGH (ref 70–99)
GLUCOSE BLDC GLUCOMTR-MCNC: 99 MG/DL — SIGNIFICANT CHANGE UP (ref 70–99)

## 2024-02-06 PROCEDURE — 99232 SBSQ HOSP IP/OBS MODERATE 35: CPT

## 2024-02-06 RX ORDER — SIMETHICONE 80 MG/1
80 TABLET, CHEWABLE ORAL EVERY 8 HOURS
Refills: 0 | Status: DISCONTINUED | OUTPATIENT
Start: 2024-02-06 | End: 2024-05-17

## 2024-02-06 RX ADMIN — CARBIDOPA AND LEVODOPA 1 TABLET(S): 25; 100 TABLET ORAL at 12:53

## 2024-02-06 RX ADMIN — Medication 0.5 MILLIGRAM(S): at 09:36

## 2024-02-06 RX ADMIN — VALACYCLOVIR 1000 MILLIGRAM(S): 500 TABLET, FILM COATED ORAL at 09:37

## 2024-02-06 RX ADMIN — DIVALPROEX SODIUM 500 MILLIGRAM(S): 500 TABLET, DELAYED RELEASE ORAL at 09:36

## 2024-02-06 RX ADMIN — Medication 50 MILLIGRAM(S): at 09:36

## 2024-02-06 RX ADMIN — Medication 650 MILLIGRAM(S): at 05:51

## 2024-02-06 RX ADMIN — HALOPERIDOL DECANOATE 5 MILLIGRAM(S): 100 INJECTION INTRAMUSCULAR at 17:33

## 2024-02-06 RX ADMIN — DIVALPROEX SODIUM 500 MILLIGRAM(S): 500 TABLET, DELAYED RELEASE ORAL at 17:32

## 2024-02-06 RX ADMIN — DIVALPROEX SODIUM 500 MILLIGRAM(S): 500 TABLET, DELAYED RELEASE ORAL at 12:53

## 2024-02-06 RX ADMIN — HALOPERIDOL DECANOATE 5 MILLIGRAM(S): 100 INJECTION INTRAMUSCULAR at 09:36

## 2024-02-06 RX ADMIN — Medication 0.5 MILLIGRAM(S): at 17:32

## 2024-02-06 RX ADMIN — QUETIAPINE FUMARATE 150 MILLIGRAM(S): 200 TABLET, FILM COATED ORAL at 21:30

## 2024-02-06 RX ADMIN — POLYETHYLENE GLYCOL 3350 17 GRAM(S): 17 POWDER, FOR SOLUTION ORAL at 09:35

## 2024-02-06 RX ADMIN — Medication 1 APPLICATION(S): at 09:38

## 2024-02-06 RX ADMIN — CARBIDOPA AND LEVODOPA 1 TABLET(S): 25; 100 TABLET ORAL at 09:36

## 2024-02-06 RX ADMIN — Medication 650 MILLIGRAM(S): at 04:36

## 2024-02-06 RX ADMIN — VALACYCLOVIR 1000 MILLIGRAM(S): 500 TABLET, FILM COATED ORAL at 21:29

## 2024-02-06 RX ADMIN — CARBIDOPA AND LEVODOPA 1 TABLET(S): 25; 100 TABLET ORAL at 17:33

## 2024-02-06 RX ADMIN — Medication 1 TABLET(S): at 09:36

## 2024-02-06 RX ADMIN — METFORMIN HYDROCHLORIDE 500 MILLIGRAM(S): 850 TABLET ORAL at 21:30

## 2024-02-06 RX ADMIN — LISINOPRIL 20 MILLIGRAM(S): 2.5 TABLET ORAL at 09:37

## 2024-02-06 RX ADMIN — METFORMIN HYDROCHLORIDE 500 MILLIGRAM(S): 850 TABLET ORAL at 09:37

## 2024-02-06 RX ADMIN — Medication 75 MICROGRAM(S): at 05:57

## 2024-02-06 RX ADMIN — SENNA PLUS 2 TABLET(S): 8.6 TABLET ORAL at 21:30

## 2024-02-06 NOTE — BH INPATIENT PSYCHIATRY PROGRESS NOTE - CURRENT MEDICATION
MEDICATIONS  (STANDING):  ammonium lactate 12% Lotion 1 Application(s) Topical two times a day  carbidopa/levodopa  25/100 1 Tablet(s) Oral <User Schedule>  clonazePAM Oral Disintegrating Tablet 0.5 milliGRAM(s) Oral <User Schedule>  dextrose 5%. 1000 milliLiter(s) (100 mL/Hr) IV Continuous <Continuous>  dextrose 5%. 1000 milliLiter(s) (50 mL/Hr) IV Continuous <Continuous>  dextrose 50% Injectable 25 Gram(s) IV Push once  dextrose 50% Injectable 12.5 Gram(s) IV Push once  dextrose 50% Injectable 25 Gram(s) IV Push once  divalproex Sprinkle 500 milliGRAM(s) Oral <User Schedule>  glucagon  Injectable 1 milliGRAM(s) IntraMuscular once  haloperidol     Tablet 5 milliGRAM(s) Oral <User Schedule>  hemorrhoidal Ointment 1 Application(s) Rectal daily  hydrocortisone 2.5% Ointment 1 Application(s) Topical two times a day  insulin lispro (ADMELOG) corrective regimen sliding scale   SubCutaneous three times a day before meals  levothyroxine 75 MICROGram(s) Oral daily  lisinopril 20 milliGRAM(s) Oral daily  metFORMIN 500 milliGRAM(s) Oral two times a day  metoprolol succinate ER 50 milliGRAM(s) Oral daily  multivitamin 1 Tablet(s) Oral <User Schedule>  polyethylene glycol 3350 17 Gram(s) Oral daily  QUEtiapine 150 milliGRAM(s) Oral at bedtime  senna 2 Tablet(s) Oral at bedtime  valACYclovir 1000 milliGRAM(s) Oral every 12 hours    MEDICATIONS  (PRN):  acetaminophen     Tablet .. 650 milliGRAM(s) Oral every 6 hours PRN Mild Pain (1 - 3)  albuterol    90 MICROgram(s) HFA Inhaler 2 Puff(s) Inhalation every 6 hours PRN Shortness of Breath and/or Wheezing  aluminum hydroxide/magnesium hydroxide/simethicone Suspension 30 milliLiter(s) Oral every 6 hours PRN Dyspepsia  bisacodyl Suppository 10 milliGRAM(s) Rectal daily PRN constipation  dextrose Oral Gel 15 Gram(s) Oral once PRN Blood Glucose LESS THAN 70 milliGRAM(s)/deciliter  haloperidol     Tablet 5 milliGRAM(s) Oral every 6 hours PRN agitation  haloperidol    Injectable 5 milliGRAM(s) IntraMuscular once PRN Severe Agitation  haloperidol    Injectable 5 milliGRAM(s) IntraMuscular once PRN severe agitation  hydrocortisone 2.5% Rectal Cream 1 Application(s) Rectal two times a day PRN hemorrhoids  LORazepam     Tablet 1 milliGRAM(s) Oral every 6 hours PRN agitation  LORazepam   Injectable 1 milliGRAM(s) IntraMuscular once PRN Severe agitation  simethicone 80 milliGRAM(s) Chew every 8 hours PRN gas

## 2024-02-06 NOTE — BH INPATIENT PSYCHIATRY PROGRESS NOTE - NSBHMETABOLIC_PSY_ALL_CORE_FT
BMI: BMI (kg/m2): 30.2 (01-15-24 @ 12:52)  HbA1c: A1C with Estimated Average Glucose Result: 7.6 % (01-12-24 @ 12:40)    Glucose: POCT Blood Glucose.: 116 mg/dL (02-06-24 @ 08:18)    BP: 126/68 (02-04-24 @ 09:53) (126/68 - 126/68)Vital Signs Last 24 Hrs  T(C): --  T(F): --  HR: --  BP: --  BP(mean): --  RR: --  SpO2: --    Orthostatic VS  02-06-24 @ 09:53  Lying BP: --/-- HR: --  Sitting BP: 118/62 HR: 64  Standing BP: 122/58 HR: 72  Site: upper left arm  Mode: auscultated w/ stethoscope    Lipid Panel:

## 2024-02-06 NOTE — BH INPATIENT PSYCHIATRY PROGRESS NOTE - PRN MEDS
MEDICATIONS  (PRN):  acetaminophen     Tablet .. 650 milliGRAM(s) Oral every 6 hours PRN Mild Pain (1 - 3)  albuterol    90 MICROgram(s) HFA Inhaler 2 Puff(s) Inhalation every 6 hours PRN Shortness of Breath and/or Wheezing  aluminum hydroxide/magnesium hydroxide/simethicone Suspension 30 milliLiter(s) Oral every 6 hours PRN Dyspepsia  bisacodyl Suppository 10 milliGRAM(s) Rectal daily PRN constipation  dextrose Oral Gel 15 Gram(s) Oral once PRN Blood Glucose LESS THAN 70 milliGRAM(s)/deciliter  haloperidol     Tablet 5 milliGRAM(s) Oral every 6 hours PRN agitation  haloperidol    Injectable 5 milliGRAM(s) IntraMuscular once PRN Severe Agitation  haloperidol    Injectable 5 milliGRAM(s) IntraMuscular once PRN severe agitation  hydrocortisone 2.5% Rectal Cream 1 Application(s) Rectal two times a day PRN hemorrhoids  LORazepam     Tablet 1 milliGRAM(s) Oral every 6 hours PRN agitation  LORazepam   Injectable 1 milliGRAM(s) IntraMuscular once PRN Severe agitation  simethicone 80 milliGRAM(s) Chew every 8 hours PRN gas

## 2024-02-06 NOTE — BH INPATIENT PSYCHIATRY PROGRESS NOTE - NSBHFUPINTERVALHXFT_PSY_A_CORE
Patient seen for schizoaffective. No major overnight events. Some trend towards  being calmer Eating, okay sleep fair. Compliant with meds. Not receiving IM meds in recent days. VSS

## 2024-02-06 NOTE — BH INPATIENT PSYCHIATRY PROGRESS NOTE - NSBHASSESSSUMMFT_PSY_ALL_CORE
2/5 Patient showing some improving trend will give more time, if needs more medication titration due to ongoing symptoms will increase Seroquel not haldol  2/6 Improving cont meds, offered prn suppository and simethicone

## 2024-02-06 NOTE — BH INPATIENT PSYCHIATRY PROGRESS NOTE - NSBHCHARTREVIEWVS_PSY_A_CORE FT
Vital Signs Last 24 Hrs  T(C): --  T(F): --  HR: --  BP: --  BP(mean): --  RR: --  SpO2: --    Orthostatic VS  02-06-24 @ 09:53  Lying BP: --/-- HR: --  Sitting BP: 118/62 HR: 64  Standing BP: 122/58 HR: 72  Site: upper left arm  Mode: auscultated w/ stethoscope

## 2024-02-07 LAB
GLUCOSE BLDC GLUCOMTR-MCNC: 118 MG/DL — HIGH (ref 70–99)
GLUCOSE BLDC GLUCOMTR-MCNC: 196 MG/DL — HIGH (ref 70–99)

## 2024-02-07 PROCEDURE — 99232 SBSQ HOSP IP/OBS MODERATE 35: CPT

## 2024-02-07 RX ORDER — CLONAZEPAM 1 MG
0.5 TABLET ORAL
Refills: 0 | Status: DISCONTINUED | OUTPATIENT
Start: 2024-02-07 | End: 2024-02-13

## 2024-02-07 RX ADMIN — METFORMIN HYDROCHLORIDE 500 MILLIGRAM(S): 850 TABLET ORAL at 09:35

## 2024-02-07 RX ADMIN — VALACYCLOVIR 1000 MILLIGRAM(S): 500 TABLET, FILM COATED ORAL at 17:18

## 2024-02-07 RX ADMIN — QUETIAPINE FUMARATE 150 MILLIGRAM(S): 200 TABLET, FILM COATED ORAL at 21:18

## 2024-02-07 RX ADMIN — CARBIDOPA AND LEVODOPA 1 TABLET(S): 25; 100 TABLET ORAL at 17:04

## 2024-02-07 RX ADMIN — Medication 0.5 MILLIGRAM(S): at 17:03

## 2024-02-07 RX ADMIN — DIVALPROEX SODIUM 500 MILLIGRAM(S): 500 TABLET, DELAYED RELEASE ORAL at 12:58

## 2024-02-07 RX ADMIN — DIVALPROEX SODIUM 500 MILLIGRAM(S): 500 TABLET, DELAYED RELEASE ORAL at 17:03

## 2024-02-07 RX ADMIN — METFORMIN HYDROCHLORIDE 500 MILLIGRAM(S): 850 TABLET ORAL at 21:18

## 2024-02-07 RX ADMIN — Medication 1 MILLIGRAM(S): at 12:59

## 2024-02-07 RX ADMIN — Medication 75 MICROGRAM(S): at 05:17

## 2024-02-07 RX ADMIN — SENNA PLUS 2 TABLET(S): 8.6 TABLET ORAL at 21:18

## 2024-02-07 RX ADMIN — CARBIDOPA AND LEVODOPA 1 TABLET(S): 25; 100 TABLET ORAL at 12:58

## 2024-02-07 RX ADMIN — HALOPERIDOL DECANOATE 5 MILLIGRAM(S): 100 INJECTION INTRAMUSCULAR at 17:04

## 2024-02-07 NOTE — BH INPATIENT PSYCHIATRY PROGRESS NOTE - NSBHASSESSSUMMFT_PSY_ALL_CORE
2/5 Patient showing some improving trend will give more time, if needs more medication titration due to ongoing symptoms will increase Seroquel not haldol  2/6 Improving cont meds, offered prn suppository and simethicone  2/& Slowly improving will cont current meds, eval need for further increase Seroquel

## 2024-02-07 NOTE — BH INPATIENT PSYCHIATRY PROGRESS NOTE - NSBHCHARTREVIEWVS_PSY_A_CORE FT
Vital Signs Last 24 Hrs  T(C): --  T(F): --  HR: --  BP: --  BP(mean): --  RR: --  SpO2: --    Orthostatic VS  02-07-24 @ 07:45  Lying BP: --/-- HR: --  Sitting BP: 133/70 HR: 74  Standing BP: --/-- HR: --  Site: --  Mode: --  Orthostatic VS  02-06-24 @ 09:53  Lying BP: --/-- HR: --  Sitting BP: 118/62 HR: 64  Standing BP: 122/58 HR: 72  Site: upper left arm  Mode: auscultated w/ stethoscope

## 2024-02-07 NOTE — BH INPATIENT PSYCHIATRY PROGRESS NOTE - MSE UNSTRUCTURED FT
Pt appears stated age, awake,  alert, poor relatedness  though sl more able to engage her. Stable gait noted.  Speech hard to understand due to poor articulation and edentulous. Has tremor R more than L but no evidence of rigidity, walks well, no shuffling or festination.  Mood elevated affect labile appearing elevated or irritable but a little better No suicidal/homicidal ideas/plan expressed. Focused on diet believes she is getting puree despite being on soft but not as  distressedThinking disjointed. Cannot assess cognition. Poor insight and judgement, is impulsive.

## 2024-02-07 NOTE — BH INPATIENT PSYCHIATRY PROGRESS NOTE - NSBHMETABOLIC_PSY_ALL_CORE_FT
BMI: BMI (kg/m2): 30.2 (01-15-24 @ 12:52)  HbA1c: A1C with Estimated Average Glucose Result: 7.6 % (01-12-24 @ 12:40)    Glucose: POCT Blood Glucose.: 196 mg/dL (02-07-24 @ 07:49)    BP: --Vital Signs Last 24 Hrs  T(C): --  T(F): --  HR: --  BP: --  BP(mean): --  RR: --  SpO2: --    Orthostatic VS  02-07-24 @ 07:45  Lying BP: --/-- HR: --  Sitting BP: 133/70 HR: 74  Standing BP: --/-- HR: --  Site: --  Mode: --  Orthostatic VS  02-06-24 @ 09:53  Lying BP: --/-- HR: --  Sitting BP: 118/62 HR: 64  Standing BP: 122/58 HR: 72  Site: upper left arm  Mode: auscultated w/ stethoscope    Lipid Panel:

## 2024-02-08 PROCEDURE — 99231 SBSQ HOSP IP/OBS SF/LOW 25: CPT

## 2024-02-08 RX ORDER — QUETIAPINE FUMARATE 200 MG/1
25 TABLET, FILM COATED ORAL DAILY
Refills: 0 | Status: DISCONTINUED | OUTPATIENT
Start: 2024-02-08 | End: 2024-02-09

## 2024-02-08 RX ADMIN — Medication 0.5 MILLIGRAM(S): at 16:34

## 2024-02-08 RX ADMIN — DIVALPROEX SODIUM 500 MILLIGRAM(S): 500 TABLET, DELAYED RELEASE ORAL at 12:46

## 2024-02-08 RX ADMIN — POLYETHYLENE GLYCOL 3350 17 GRAM(S): 17 POWDER, FOR SOLUTION ORAL at 08:36

## 2024-02-08 RX ADMIN — DIVALPROEX SODIUM 500 MILLIGRAM(S): 500 TABLET, DELAYED RELEASE ORAL at 08:41

## 2024-02-08 RX ADMIN — CARBIDOPA AND LEVODOPA 1 TABLET(S): 25; 100 TABLET ORAL at 16:34

## 2024-02-08 RX ADMIN — HALOPERIDOL DECANOATE 5 MILLIGRAM(S): 100 INJECTION INTRAMUSCULAR at 16:36

## 2024-02-08 RX ADMIN — Medication 50 MILLIGRAM(S): at 08:37

## 2024-02-08 RX ADMIN — LISINOPRIL 20 MILLIGRAM(S): 2.5 TABLET ORAL at 08:36

## 2024-02-08 RX ADMIN — CARBIDOPA AND LEVODOPA 1 TABLET(S): 25; 100 TABLET ORAL at 08:42

## 2024-02-08 RX ADMIN — VALACYCLOVIR 1000 MILLIGRAM(S): 500 TABLET, FILM COATED ORAL at 20:42

## 2024-02-08 RX ADMIN — METFORMIN HYDROCHLORIDE 500 MILLIGRAM(S): 850 TABLET ORAL at 08:38

## 2024-02-08 RX ADMIN — VALACYCLOVIR 1000 MILLIGRAM(S): 500 TABLET, FILM COATED ORAL at 08:36

## 2024-02-08 RX ADMIN — Medication 1 TABLET(S): at 10:16

## 2024-02-08 RX ADMIN — CARBIDOPA AND LEVODOPA 1 TABLET(S): 25; 100 TABLET ORAL at 12:46

## 2024-02-08 RX ADMIN — METFORMIN HYDROCHLORIDE 500 MILLIGRAM(S): 850 TABLET ORAL at 20:42

## 2024-02-08 RX ADMIN — QUETIAPINE FUMARATE 150 MILLIGRAM(S): 200 TABLET, FILM COATED ORAL at 20:42

## 2024-02-08 RX ADMIN — SENNA PLUS 2 TABLET(S): 8.6 TABLET ORAL at 20:41

## 2024-02-08 RX ADMIN — HALOPERIDOL DECANOATE 5 MILLIGRAM(S): 100 INJECTION INTRAMUSCULAR at 08:42

## 2024-02-08 RX ADMIN — DIVALPROEX SODIUM 500 MILLIGRAM(S): 500 TABLET, DELAYED RELEASE ORAL at 16:35

## 2024-02-08 RX ADMIN — Medication 0.5 MILLIGRAM(S): at 08:40

## 2024-02-08 NOTE — BH INPATIENT PSYCHIATRY PROGRESS NOTE - NSBHMETABOLIC_PSY_ALL_CORE_FT
BMI: BMI (kg/m2): 30.2 (01-15-24 @ 12:52)  HbA1c: A1C with Estimated Average Glucose Result: 7.6 % (01-12-24 @ 12:40)    Glucose: POCT Blood Glucose.: 118 mg/dL (02-07-24 @ 12:13)    BP: --Vital Signs Last 24 Hrs  T(C): --  T(F): --  HR: --  BP: --  BP(mean): --  RR: --  SpO2: --    Orthostatic VS  02-07-24 @ 07:45  Lying BP: --/-- HR: --  Sitting BP: 133/70 HR: 74  Standing BP: --/-- HR: --  Site: --  Mode: --    Lipid Panel:

## 2024-02-08 NOTE — BH INPATIENT PSYCHIATRY PROGRESS NOTE - CURRENT MEDICATION
MEDICATIONS  (STANDING):  ammonium lactate 12% Lotion 1 Application(s) Topical two times a day  carbidopa/levodopa  25/100 1 Tablet(s) Oral <User Schedule>  clonazePAM Oral Disintegrating Tablet 0.5 milliGRAM(s) Oral <User Schedule>  dextrose 5%. 1000 milliLiter(s) (50 mL/Hr) IV Continuous <Continuous>  dextrose 5%. 1000 milliLiter(s) (100 mL/Hr) IV Continuous <Continuous>  dextrose 50% Injectable 25 Gram(s) IV Push once  dextrose 50% Injectable 12.5 Gram(s) IV Push once  dextrose 50% Injectable 25 Gram(s) IV Push once  divalproex Sprinkle 500 milliGRAM(s) Oral <User Schedule>  glucagon  Injectable 1 milliGRAM(s) IntraMuscular once  haloperidol     Tablet 5 milliGRAM(s) Oral <User Schedule>  hemorrhoidal Ointment 1 Application(s) Rectal daily  hydrocortisone 2.5% Ointment 1 Application(s) Topical two times a day  insulin lispro (ADMELOG) corrective regimen sliding scale   SubCutaneous three times a day before meals  levothyroxine 75 MICROGram(s) Oral daily  lisinopril 20 milliGRAM(s) Oral daily  metFORMIN 500 milliGRAM(s) Oral two times a day  metoprolol succinate ER 50 milliGRAM(s) Oral daily  multivitamin 1 Tablet(s) Oral <User Schedule>  polyethylene glycol 3350 17 Gram(s) Oral daily  QUEtiapine 150 milliGRAM(s) Oral at bedtime  QUEtiapine 25 milliGRAM(s) Oral daily  senna 2 Tablet(s) Oral at bedtime  valACYclovir 1000 milliGRAM(s) Oral every 12 hours    MEDICATIONS  (PRN):  acetaminophen     Tablet .. 650 milliGRAM(s) Oral every 6 hours PRN Mild Pain (1 - 3)  albuterol    90 MICROgram(s) HFA Inhaler 2 Puff(s) Inhalation every 6 hours PRN Shortness of Breath and/or Wheezing  aluminum hydroxide/magnesium hydroxide/simethicone Suspension 30 milliLiter(s) Oral every 6 hours PRN Dyspepsia  bisacodyl Suppository 10 milliGRAM(s) Rectal daily PRN constipation  dextrose Oral Gel 15 Gram(s) Oral once PRN Blood Glucose LESS THAN 70 milliGRAM(s)/deciliter  haloperidol     Tablet 5 milliGRAM(s) Oral every 6 hours PRN agitation  haloperidol    Injectable 5 milliGRAM(s) IntraMuscular once PRN Severe Agitation  haloperidol    Injectable 5 milliGRAM(s) IntraMuscular once PRN severe agitation  hydrocortisone 2.5% Rectal Cream 1 Application(s) Rectal two times a day PRN hemorrhoids  LORazepam     Tablet 1 milliGRAM(s) Oral every 6 hours PRN agitation  LORazepam   Injectable 1 milliGRAM(s) IntraMuscular once PRN Severe agitation  simethicone 80 milliGRAM(s) Chew every 8 hours PRN gas

## 2024-02-08 NOTE — BH INPATIENT PSYCHIATRY PROGRESS NOTE - NSBHCHARTREVIEWVS_PSY_A_CORE FT
Vital Signs Last 24 Hrs  T(C): --  T(F): --  HR: --  BP: --  BP(mean): --  RR: --  SpO2: --    Orthostatic VS  02-07-24 @ 07:45  Lying BP: --/-- HR: --  Sitting BP: 133/70 HR: 74  Standing BP: --/-- HR: --  Site: --  Mode: --

## 2024-02-08 NOTE — BH INPATIENT PSYCHIATRY PROGRESS NOTE - NSBHFUPINTERVALHXFT_PSY_A_CORE
Patient seen for schizoaffective. No major overnight events. Some trend towards  being calmer Eating, okay sleep fair. Compliant with meds. Not receiving IM meds in recent days.Refused vitals today. Sister says she still sounds disorganized on phone talking about  relatives, spirits, ghosts

## 2024-02-08 NOTE — BH INPATIENT PSYCHIATRY PROGRESS NOTE - MSE UNSTRUCTURED FT
Pt appears stated age, awake,  alert, poor relatedness  though sl more able to engage her. Stable gait noted.  Speech hard to understand due to poor articulation and edentulous. Has tremor R more than L but no evidence of rigidity, walks well, no shuffling or festination.  Mood irritable, affect labile though less severe No suicidal/homicidal ideas/plan expressed. Focused on diet believes she is getting puree despite being on soft but not as  distressedThinking disjointed. Cannot assess cognition. Poor insight and judgement, is impulsive.

## 2024-02-08 NOTE — BH INPATIENT PSYCHIATRY PROGRESS NOTE - NSBHASSESSSUMMFT_PSY_ALL_CORE
2/5 Patient showing some improving trend will give more time, if needs more medication titration due to ongoing symptoms will increase Seroquel not haldol  2/6 Improving cont meds, offered prn suppository and simethicone  2/7 Slowly improving will cont current meds, eval need for further increase Seroquel  2/8 Partial response continue meds except will increase Seroquel as was on 250mg/d PTA

## 2024-02-09 LAB
GLUCOSE BLDC GLUCOMTR-MCNC: 116 MG/DL — HIGH (ref 70–99)
GLUCOSE BLDC GLUCOMTR-MCNC: 198 MG/DL — HIGH (ref 70–99)

## 2024-02-09 PROCEDURE — 99231 SBSQ HOSP IP/OBS SF/LOW 25: CPT

## 2024-02-09 RX ORDER — QUETIAPINE FUMARATE 200 MG/1
50 TABLET, FILM COATED ORAL DAILY
Refills: 0 | Status: DISCONTINUED | OUTPATIENT
Start: 2024-02-09 | End: 2024-02-11

## 2024-02-09 RX ADMIN — VALACYCLOVIR 1000 MILLIGRAM(S): 500 TABLET, FILM COATED ORAL at 22:09

## 2024-02-09 RX ADMIN — METFORMIN HYDROCHLORIDE 500 MILLIGRAM(S): 850 TABLET ORAL at 22:09

## 2024-02-09 RX ADMIN — HALOPERIDOL DECANOATE 5 MILLIGRAM(S): 100 INJECTION INTRAMUSCULAR at 01:01

## 2024-02-09 RX ADMIN — Medication 1 TABLET(S): at 09:43

## 2024-02-09 RX ADMIN — QUETIAPINE FUMARATE 150 MILLIGRAM(S): 200 TABLET, FILM COATED ORAL at 22:08

## 2024-02-09 RX ADMIN — HALOPERIDOL DECANOATE 5 MILLIGRAM(S): 100 INJECTION INTRAMUSCULAR at 17:25

## 2024-02-09 RX ADMIN — CARBIDOPA AND LEVODOPA 1 TABLET(S): 25; 100 TABLET ORAL at 13:17

## 2024-02-09 RX ADMIN — Medication 0.5 MILLIGRAM(S): at 17:25

## 2024-02-09 RX ADMIN — SENNA PLUS 2 TABLET(S): 8.6 TABLET ORAL at 22:09

## 2024-02-09 RX ADMIN — HALOPERIDOL DECANOATE 5 MILLIGRAM(S): 100 INJECTION INTRAMUSCULAR at 09:43

## 2024-02-09 RX ADMIN — CARBIDOPA AND LEVODOPA 1 TABLET(S): 25; 100 TABLET ORAL at 09:23

## 2024-02-09 RX ADMIN — DIVALPROEX SODIUM 500 MILLIGRAM(S): 500 TABLET, DELAYED RELEASE ORAL at 13:17

## 2024-02-09 RX ADMIN — LISINOPRIL 20 MILLIGRAM(S): 2.5 TABLET ORAL at 09:44

## 2024-02-09 RX ADMIN — Medication 75 MICROGRAM(S): at 06:51

## 2024-02-09 RX ADMIN — DIVALPROEX SODIUM 500 MILLIGRAM(S): 500 TABLET, DELAYED RELEASE ORAL at 09:44

## 2024-02-09 RX ADMIN — CARBIDOPA AND LEVODOPA 1 TABLET(S): 25; 100 TABLET ORAL at 17:23

## 2024-02-09 RX ADMIN — QUETIAPINE FUMARATE 25 MILLIGRAM(S): 200 TABLET, FILM COATED ORAL at 09:44

## 2024-02-09 RX ADMIN — Medication 0.5 MILLIGRAM(S): at 09:43

## 2024-02-09 RX ADMIN — METFORMIN HYDROCHLORIDE 500 MILLIGRAM(S): 850 TABLET ORAL at 09:44

## 2024-02-09 RX ADMIN — DIVALPROEX SODIUM 500 MILLIGRAM(S): 500 TABLET, DELAYED RELEASE ORAL at 17:24

## 2024-02-09 RX ADMIN — Medication 50 MILLIGRAM(S): at 09:44

## 2024-02-09 RX ADMIN — VALACYCLOVIR 1000 MILLIGRAM(S): 500 TABLET, FILM COATED ORAL at 09:46

## 2024-02-09 NOTE — BH INPATIENT PSYCHIATRY PROGRESS NOTE - CURRENT MEDICATION
MEDICATIONS  (STANDING):  ammonium lactate 12% Lotion 1 Application(s) Topical two times a day  carbidopa/levodopa  25/100 1 Tablet(s) Oral <User Schedule>  clonazePAM Oral Disintegrating Tablet 0.5 milliGRAM(s) Oral <User Schedule>  dextrose 5%. 1000 milliLiter(s) (50 mL/Hr) IV Continuous <Continuous>  dextrose 5%. 1000 milliLiter(s) (100 mL/Hr) IV Continuous <Continuous>  dextrose 50% Injectable 25 Gram(s) IV Push once  dextrose 50% Injectable 12.5 Gram(s) IV Push once  dextrose 50% Injectable 25 Gram(s) IV Push once  divalproex Sprinkle 500 milliGRAM(s) Oral <User Schedule>  glucagon  Injectable 1 milliGRAM(s) IntraMuscular once  haloperidol     Tablet 5 milliGRAM(s) Oral <User Schedule>  hemorrhoidal Ointment 1 Application(s) Rectal daily  hydrocortisone 2.5% Ointment 1 Application(s) Topical two times a day  insulin lispro (ADMELOG) corrective regimen sliding scale   SubCutaneous three times a day before meals  levothyroxine 75 MICROGram(s) Oral daily  lisinopril 20 milliGRAM(s) Oral daily  metFORMIN 500 milliGRAM(s) Oral two times a day  metoprolol succinate ER 50 milliGRAM(s) Oral daily  multivitamin 1 Tablet(s) Oral <User Schedule>  polyethylene glycol 3350 17 Gram(s) Oral daily  QUEtiapine 50 milliGRAM(s) Oral daily  QUEtiapine 150 milliGRAM(s) Oral at bedtime  senna 2 Tablet(s) Oral at bedtime  valACYclovir 1000 milliGRAM(s) Oral every 12 hours    MEDICATIONS  (PRN):  acetaminophen     Tablet .. 650 milliGRAM(s) Oral every 6 hours PRN Mild Pain (1 - 3)  albuterol    90 MICROgram(s) HFA Inhaler 2 Puff(s) Inhalation every 6 hours PRN Shortness of Breath and/or Wheezing  aluminum hydroxide/magnesium hydroxide/simethicone Suspension 30 milliLiter(s) Oral every 6 hours PRN Dyspepsia  bisacodyl Suppository 10 milliGRAM(s) Rectal daily PRN constipation  dextrose Oral Gel 15 Gram(s) Oral once PRN Blood Glucose LESS THAN 70 milliGRAM(s)/deciliter  haloperidol     Tablet 5 milliGRAM(s) Oral every 6 hours PRN agitation  haloperidol    Injectable 5 milliGRAM(s) IntraMuscular once PRN severe agitation  haloperidol    Injectable 5 milliGRAM(s) IntraMuscular once PRN Severe Agitation  hydrocortisone 2.5% Rectal Cream 1 Application(s) Rectal two times a day PRN hemorrhoids  LORazepam     Tablet 1 milliGRAM(s) Oral every 6 hours PRN agitation  LORazepam   Injectable 1 milliGRAM(s) IntraMuscular once PRN Severe agitation  simethicone 80 milliGRAM(s) Chew every 8 hours PRN gas

## 2024-02-09 NOTE — BH INPATIENT PSYCHIATRY PROGRESS NOTE - NSBHFUPINTERVALHXFT_PSY_A_CORE
Patient seen for schizoaffective.Got po prn overnight was loud intrusive unable to get to sleep Some trend towards  being calmer Eating, okay sleep fair. Compliant with meds. VSS. Sister says she still sounds disorganized on phone talking about  relatives, spirits, ghosts

## 2024-02-09 NOTE — BH INPATIENT PSYCHIATRY PROGRESS NOTE - NSBHASSESSSUMMFT_PSY_ALL_CORE
2/5 Patient showing some improving trend will give more time, if needs more medication titration due to ongoing symptoms will increase Seroquel not haldol  2/6 Improving cont meds, offered prn suppository and simethicone  2/7 Slowly improving will cont current meds, eval need for further increase Seroquel  2/8 Partial response continue meds except will increase Seroquel as was on 250mg/d PTA  2/9 Overall gains, still regresses and becomes difficult to manage will increase Seroquel to 200mg/d in divided dosing

## 2024-02-09 NOTE — BH INPATIENT PSYCHIATRY PROGRESS NOTE - NSBHCHARTREVIEWVS_PSY_A_CORE FT
Vital Signs Last 24 Hrs  T(C): 36.7 (02-09-24 @ 09:15), Max: 36.7 (02-09-24 @ 09:15)  T(F): 98.1 (02-09-24 @ 09:15), Max: 98.1 (02-09-24 @ 09:15)  HR: --  BP: --  BP(mean): --  RR: --  SpO2: --    Orthostatic VS  02-09-24 @ 09:15  Lying BP: --/-- HR: --  Sitting BP: 137/83 HR: 82  Standing BP: 118/65 HR: 81  Site: upper right arm  Mode: electronic

## 2024-02-09 NOTE — BH INPATIENT PSYCHIATRY PROGRESS NOTE - NSBHMETABOLIC_PSY_ALL_CORE_FT
BMI: BMI (kg/m2): 30.2 (01-15-24 @ 12:52)  HbA1c: A1C with Estimated Average Glucose Result: 7.6 % (01-12-24 @ 12:40)    Glucose: POCT Blood Glucose.: 118 mg/dL (02-07-24 @ 12:13)    BP: --Vital Signs Last 24 Hrs  T(C): 36.7 (02-09-24 @ 09:15), Max: 36.7 (02-09-24 @ 09:15)  T(F): 98.1 (02-09-24 @ 09:15), Max: 98.1 (02-09-24 @ 09:15)  HR: --  BP: --  BP(mean): --  RR: --  SpO2: --    Orthostatic VS  02-09-24 @ 09:15  Lying BP: --/-- HR: --  Sitting BP: 137/83 HR: 82  Standing BP: 118/65 HR: 81  Site: upper right arm  Mode: electronic    Lipid Panel:

## 2024-02-09 NOTE — BH INPATIENT PSYCHIATRY PROGRESS NOTE - MSE UNSTRUCTURED FT
Pt appears stated age, awake,  alert, poor relatedness  though sl more able to engage her. Stable gait noted.  Speech hard to understand due to poor articulation and edentulous. Has tremor R more than L but no evidence of rigidity, walks well, no shuffling or festination.  Mood irritable, affect labile though less severe No suicidal/homicidal ideas/plan expressed.Religiously focused only wants meds from spiritually pure nursesThinking disjointed. Cannot assess cognition. Poor insight and judgement, is impulsive.

## 2024-02-10 DIAGNOSIS — N81.10 CYSTOCELE, UNSPECIFIED: ICD-10-CM

## 2024-02-10 PROCEDURE — 99231 SBSQ HOSP IP/OBS SF/LOW 25: CPT

## 2024-02-10 PROCEDURE — 99223 1ST HOSP IP/OBS HIGH 75: CPT

## 2024-02-10 RX ADMIN — VALACYCLOVIR 1000 MILLIGRAM(S): 500 TABLET, FILM COATED ORAL at 09:18

## 2024-02-10 RX ADMIN — CARBIDOPA AND LEVODOPA 1 TABLET(S): 25; 100 TABLET ORAL at 17:03

## 2024-02-10 RX ADMIN — VALACYCLOVIR 1000 MILLIGRAM(S): 500 TABLET, FILM COATED ORAL at 21:33

## 2024-02-10 RX ADMIN — Medication 0.5 MILLIGRAM(S): at 09:18

## 2024-02-10 RX ADMIN — METFORMIN HYDROCHLORIDE 500 MILLIGRAM(S): 850 TABLET ORAL at 21:33

## 2024-02-10 RX ADMIN — SENNA PLUS 2 TABLET(S): 8.6 TABLET ORAL at 21:33

## 2024-02-10 RX ADMIN — DIVALPROEX SODIUM 500 MILLIGRAM(S): 500 TABLET, DELAYED RELEASE ORAL at 12:49

## 2024-02-10 RX ADMIN — CARBIDOPA AND LEVODOPA 1 TABLET(S): 25; 100 TABLET ORAL at 12:48

## 2024-02-10 RX ADMIN — Medication 0.5 MILLIGRAM(S): at 17:03

## 2024-02-10 RX ADMIN — DIVALPROEX SODIUM 500 MILLIGRAM(S): 500 TABLET, DELAYED RELEASE ORAL at 09:19

## 2024-02-10 RX ADMIN — DIVALPROEX SODIUM 500 MILLIGRAM(S): 500 TABLET, DELAYED RELEASE ORAL at 17:03

## 2024-02-10 RX ADMIN — Medication 50 MILLIGRAM(S): at 09:18

## 2024-02-10 RX ADMIN — METFORMIN HYDROCHLORIDE 500 MILLIGRAM(S): 850 TABLET ORAL at 09:18

## 2024-02-10 RX ADMIN — LISINOPRIL 20 MILLIGRAM(S): 2.5 TABLET ORAL at 09:19

## 2024-02-10 RX ADMIN — Medication 30 MILLILITER(S): at 03:18

## 2024-02-10 RX ADMIN — QUETIAPINE FUMARATE 50 MILLIGRAM(S): 200 TABLET, FILM COATED ORAL at 09:19

## 2024-02-10 RX ADMIN — CARBIDOPA AND LEVODOPA 1 TABLET(S): 25; 100 TABLET ORAL at 09:18

## 2024-02-10 RX ADMIN — QUETIAPINE FUMARATE 150 MILLIGRAM(S): 200 TABLET, FILM COATED ORAL at 21:33

## 2024-02-10 NOTE — BH INPATIENT PSYCHIATRY PROGRESS NOTE - NSBHMETABOLIC_PSY_ALL_CORE_FT
BMI: BMI (kg/m2): 30.2 (01-15-24 @ 12:52)  HbA1c: A1C with Estimated Average Glucose Result: 7.6 % (01-12-24 @ 12:40)    Glucose: POCT Blood Glucose.: 198 mg/dL (02-09-24 @ 20:51)    BP: --Vital Signs Last 24 Hrs  T(C): --  T(F): --  HR: --  BP: --  BP(mean): --  RR: --  SpO2: --    Orthostatic VS  02-09-24 @ 09:15  Lying BP: --/-- HR: --  Sitting BP: 137/83 HR: 82  Standing BP: 118/65 HR: 81  Site: upper right arm  Mode: electronic    Lipid Panel:

## 2024-02-10 NOTE — BH INPATIENT PSYCHIATRY PROGRESS NOTE - CURRENT MEDICATION
MEDICATIONS  (STANDING):  ammonium lactate 12% Lotion 1 Application(s) Topical two times a day  carbidopa/levodopa  25/100 1 Tablet(s) Oral <User Schedule>  clonazePAM Oral Disintegrating Tablet 0.5 milliGRAM(s) Oral <User Schedule>  dextrose 5%. 1000 milliLiter(s) (50 mL/Hr) IV Continuous <Continuous>  dextrose 5%. 1000 milliLiter(s) (100 mL/Hr) IV Continuous <Continuous>  dextrose 50% Injectable 25 Gram(s) IV Push once  dextrose 50% Injectable 12.5 Gram(s) IV Push once  dextrose 50% Injectable 25 Gram(s) IV Push once  divalproex Sprinkle 500 milliGRAM(s) Oral <User Schedule>  glucagon  Injectable 1 milliGRAM(s) IntraMuscular once  haloperidol     Tablet 5 milliGRAM(s) Oral <User Schedule>  hemorrhoidal Ointment 1 Application(s) Rectal daily  hydrocortisone 2.5% Ointment 1 Application(s) Topical two times a day  insulin lispro (ADMELOG) corrective regimen sliding scale   SubCutaneous three times a day before meals  levothyroxine 75 MICROGram(s) Oral daily  lisinopril 20 milliGRAM(s) Oral daily  metFORMIN 500 milliGRAM(s) Oral two times a day  metoprolol succinate ER 50 milliGRAM(s) Oral daily  multivitamin 1 Tablet(s) Oral <User Schedule>  polyethylene glycol 3350 17 Gram(s) Oral daily  QUEtiapine 150 milliGRAM(s) Oral at bedtime  QUEtiapine 50 milliGRAM(s) Oral daily  senna 2 Tablet(s) Oral at bedtime  valACYclovir 1000 milliGRAM(s) Oral every 12 hours    MEDICATIONS  (PRN):  acetaminophen     Tablet .. 650 milliGRAM(s) Oral every 6 hours PRN Mild Pain (1 - 3)  albuterol    90 MICROgram(s) HFA Inhaler 2 Puff(s) Inhalation every 6 hours PRN Shortness of Breath and/or Wheezing  aluminum hydroxide/magnesium hydroxide/simethicone Suspension 30 milliLiter(s) Oral every 6 hours PRN Dyspepsia  bisacodyl Suppository 10 milliGRAM(s) Rectal daily PRN constipation  dextrose Oral Gel 15 Gram(s) Oral once PRN Blood Glucose LESS THAN 70 milliGRAM(s)/deciliter  haloperidol     Tablet 5 milliGRAM(s) Oral every 6 hours PRN agitation  haloperidol    Injectable 5 milliGRAM(s) IntraMuscular once PRN Severe Agitation  haloperidol    Injectable 5 milliGRAM(s) IntraMuscular once PRN severe agitation  hydrocortisone 2.5% Rectal Cream 1 Application(s) Rectal two times a day PRN hemorrhoids  LORazepam     Tablet 1 milliGRAM(s) Oral every 6 hours PRN agitation  LORazepam   Injectable 1 milliGRAM(s) IntraMuscular once PRN Severe agitation  simethicone 80 milliGRAM(s) Chew every 8 hours PRN gas

## 2024-02-10 NOTE — BH INPATIENT PSYCHIATRY PROGRESS NOTE - NSBHASSESSSUMMFT_PSY_ALL_CORE
2/5 Patient showing some improving trend will give more time, if needs more medication titration due to ongoing symptoms will increase Seroquel not haldol  2/6 Improving cont meds, offered prn suppository and simethicone  2/7 Slowly improving will cont current meds, eval need for further increase Seroquel  2/8 Partial response continue meds except will increase Seroquel as was on 250mg/d PTA  2/9 Overall gains, still regresses and becomes difficult to manage will increase Seroquel to 200mg/d in divided dosing  2/10 fluctuaing level of agitation though overall better, cont current meds likely titrate Seroquel further based on prior effective dose

## 2024-02-10 NOTE — BH INPATIENT PSYCHIATRY PROGRESS NOTE - NSBHFUPINTERVALHXFT_PSY_A_CORE
Patient seen for schizoaffective. No prn overnight Some trend towards  being calmer Eating, okay sleep fair. Compliant with meds.Refused vitals today. Sister says she still sounds disorganized on phone talking about  relatives, spirits, ghosts. Seen dancing in dayroom

## 2024-02-10 NOTE — BH INPATIENT PSYCHIATRY PROGRESS NOTE - MSE UNSTRUCTURED FT
Pt appears stated age, awake,  alert, poor relatedness  though sl more able to engage her. Stable gait noted.  Speech hard to understand due to poor articulation and edentulous. Has tremor R more than L but no evidence of rigidity, walks well, no shuffling or festination.  Mood irritable, affect labile though less severe No suicidal/homicidal ideas/plan expressed. Religiously focused only wants meds from spiritually pure nursesThinking disjointed. Cannot assess cognition. Poor insight and judgement, is impulsive.

## 2024-02-10 NOTE — BH INPATIENT PSYCHIATRY PROGRESS NOTE - NSBHCHARTREVIEWVS_PSY_A_CORE FT
Vital Signs Last 24 Hrs  T(C): --  T(F): --  HR: --  BP: --  BP(mean): --  RR: --  SpO2: --    Orthostatic VS  02-09-24 @ 09:15  Lying BP: --/-- HR: --  Sitting BP: 137/83 HR: 82  Standing BP: 118/65 HR: 81  Site: upper right arm  Mode: electronic

## 2024-02-10 NOTE — CONSULT NOTE ADULT - SUBJECTIVE AND OBJECTIVE BOX
[facility]  73y  Female [id number]    Patient is a 73y old  Female P [id number] presents with a chief complaint of pelvic organ prolapse     (18 Jan 2024 12:51)      HPI:  Patient is poor historian but states that she used a pessary in the past but it fell out when she had a bowel movement  She could not remember how long she used a pessary or who gave her the pessary  She could not recall if she had symptoms of pelvic organ prolapse and had concerns as to whether she was currently pregnant      REVIEW OF SYSTEMS:  GENITOURINARY: No dysuria, frequency or hematuria No vaginal bleeding      MEDICATIONS  (STANDING):  ammonium lactate 12% Lotion 1 Application(s) Topical two times a day  carbidopa/levodopa  25/100 1 Tablet(s) Oral <User Schedule>  clonazePAM Oral Disintegrating Tablet 0.5 milliGRAM(s) Oral <User Schedule>  dextrose 5%. 1000 milliLiter(s) (50 mL/Hr) IV Continuous <Continuous>  dextrose 5%. 1000 milliLiter(s) (100 mL/Hr) IV Continuous <Continuous>  dextrose 50% Injectable 25 Gram(s) IV Push once  dextrose 50% Injectable 12.5 Gram(s) IV Push once  dextrose 50% Injectable 25 Gram(s) IV Push once  divalproex Sprinkle 500 milliGRAM(s) Oral <User Schedule>  glucagon  Injectable 1 milliGRAM(s) IntraMuscular once  haloperidol     Tablet 5 milliGRAM(s) Oral <User Schedule>  hemorrhoidal Ointment 1 Application(s) Rectal daily  hydrocortisone 2.5% Ointment 1 Application(s) Topical two times a day  insulin lispro (ADMELOG) corrective regimen sliding scale   SubCutaneous three times a day before meals  levothyroxine 75 MICROGram(s) Oral daily  lisinopril 20 milliGRAM(s) Oral daily  metFORMIN 500 milliGRAM(s) Oral two times a day  metoprolol succinate ER 50 milliGRAM(s) Oral daily  multivitamin 1 Tablet(s) Oral <User Schedule>  polyethylene glycol 3350 17 Gram(s) Oral daily  QUEtiapine 50 milliGRAM(s) Oral daily  QUEtiapine 150 milliGRAM(s) Oral at bedtime  senna 2 Tablet(s) Oral at bedtime  valACYclovir 1000 milliGRAM(s) Oral every 12 hours    MEDICATIONS  (PRN):  acetaminophen     Tablet .. 650 milliGRAM(s) Oral every 6 hours PRN Mild Pain (1 - 3)  albuterol    90 MICROgram(s) HFA Inhaler 2 Puff(s) Inhalation every 6 hours PRN Shortness of Breath and/or Wheezing  aluminum hydroxide/magnesium hydroxide/simethicone Suspension 30 milliLiter(s) Oral every 6 hours PRN Dyspepsia  bisacodyl Suppository 10 milliGRAM(s) Rectal daily PRN constipation  dextrose Oral Gel 15 Gram(s) Oral once PRN Blood Glucose LESS THAN 70 milliGRAM(s)/deciliter  haloperidol     Tablet 5 milliGRAM(s) Oral every 6 hours PRN agitation  haloperidol    Injectable 5 milliGRAM(s) IntraMuscular once PRN Severe Agitation  haloperidol    Injectable 5 milliGRAM(s) IntraMuscular once PRN severe agitation  hydrocortisone 2.5% Rectal Cream 1 Application(s) Rectal two times a day PRN hemorrhoids  LORazepam     Tablet 1 milliGRAM(s) Oral every 6 hours PRN agitation  LORazepam   Injectable 1 milliGRAM(s) IntraMuscular once PRN Severe agitation  simethicone 80 milliGRAM(s) Chew every 8 hours PRN gas      Allergies    penicillin (Hives; Rash)    Intolerances        PAST MEDICAL & SURGICAL HISTORY:  Parkinson disease      Seizure disorder      Hypothyroidism      Type II diabetes mellitus      Hyperlipidemia      Schizophrenia      Hemorrhoids      Hypertension      No significant past surgical history          OB/GYN HISTORY:   Menarchy           Cycle Length              Days Flow                                       Last Menstrual Period                                         G    P 4004                                       Last Pap smear:   [id number]                                            FAMILY HISTORY:  No pertinent family history in first degree relatives        SOCIAL HISTORY:    Vital Signs Last 24 Hrs        PHYSICAL EXAM:  Constitutional: NAD, awake and alert, well-developed Very somnolent Unable to focus on to most questions  Refused physical exam and only permitted visual evaluation of external genitalia  :  Extenal genitalia without lesions + cystocele present at the introitus with valsalva                  RADIOLOGY & ADDITIONAL STUDIES:

## 2024-02-11 LAB
GLUCOSE BLDC GLUCOMTR-MCNC: 192 MG/DL — HIGH (ref 70–99)
GLUCOSE BLDC GLUCOMTR-MCNC: 97 MG/DL — SIGNIFICANT CHANGE UP (ref 70–99)

## 2024-02-11 RX ORDER — QUETIAPINE FUMARATE 200 MG/1
50 TABLET, FILM COATED ORAL
Refills: 0 | Status: DISCONTINUED | OUTPATIENT
Start: 2024-02-11 | End: 2024-03-21

## 2024-02-11 RX ORDER — POLYETHYLENE GLYCOL 3350 17 G/17G
8.5 POWDER, FOR SOLUTION ORAL DAILY
Refills: 0 | Status: DISCONTINUED | OUTPATIENT
Start: 2024-02-11 | End: 2024-02-21

## 2024-02-11 RX ORDER — SENNA PLUS 8.6 MG/1
1 TABLET ORAL AT BEDTIME
Refills: 0 | Status: DISCONTINUED | OUTPATIENT
Start: 2024-02-11 | End: 2024-02-21

## 2024-02-11 RX ADMIN — VALACYCLOVIR 1000 MILLIGRAM(S): 500 TABLET, FILM COATED ORAL at 12:30

## 2024-02-11 RX ADMIN — VALACYCLOVIR 1000 MILLIGRAM(S): 500 TABLET, FILM COATED ORAL at 21:42

## 2024-02-11 RX ADMIN — Medication 75 MICROGRAM(S): at 08:48

## 2024-02-11 RX ADMIN — SENNA PLUS 1 TABLET(S): 8.6 TABLET ORAL at 21:43

## 2024-02-11 RX ADMIN — QUETIAPINE FUMARATE 50 MILLIGRAM(S): 200 TABLET, FILM COATED ORAL at 12:37

## 2024-02-11 RX ADMIN — DIVALPROEX SODIUM 500 MILLIGRAM(S): 500 TABLET, DELAYED RELEASE ORAL at 12:33

## 2024-02-11 RX ADMIN — Medication 1 MILLIGRAM(S): at 01:36

## 2024-02-11 RX ADMIN — Medication 50 MILLIGRAM(S): at 12:31

## 2024-02-11 RX ADMIN — METFORMIN HYDROCHLORIDE 500 MILLIGRAM(S): 850 TABLET ORAL at 21:43

## 2024-02-11 RX ADMIN — LISINOPRIL 20 MILLIGRAM(S): 2.5 TABLET ORAL at 12:32

## 2024-02-11 RX ADMIN — CARBIDOPA AND LEVODOPA 1 TABLET(S): 25; 100 TABLET ORAL at 12:31

## 2024-02-11 RX ADMIN — Medication 0.5 MILLIGRAM(S): at 17:42

## 2024-02-11 RX ADMIN — QUETIAPINE FUMARATE 150 MILLIGRAM(S): 200 TABLET, FILM COATED ORAL at 21:42

## 2024-02-11 RX ADMIN — CARBIDOPA AND LEVODOPA 1 TABLET(S): 25; 100 TABLET ORAL at 17:42

## 2024-02-11 RX ADMIN — Medication 1 TABLET(S): at 12:30

## 2024-02-11 RX ADMIN — METFORMIN HYDROCHLORIDE 500 MILLIGRAM(S): 850 TABLET ORAL at 12:32

## 2024-02-11 RX ADMIN — DIVALPROEX SODIUM 500 MILLIGRAM(S): 500 TABLET, DELAYED RELEASE ORAL at 17:41

## 2024-02-11 RX ADMIN — Medication 0.5 MILLIGRAM(S): at 12:32

## 2024-02-12 LAB
GLUCOSE BLDC GLUCOMTR-MCNC: 147 MG/DL — HIGH (ref 70–99)
GLUCOSE BLDC GLUCOMTR-MCNC: 167 MG/DL — HIGH (ref 70–99)

## 2024-02-12 PROCEDURE — 99231 SBSQ HOSP IP/OBS SF/LOW 25: CPT

## 2024-02-12 RX ADMIN — Medication 75 MICROGRAM(S): at 06:47

## 2024-02-12 RX ADMIN — QUETIAPINE FUMARATE 150 MILLIGRAM(S): 200 TABLET, FILM COATED ORAL at 21:30

## 2024-02-12 RX ADMIN — METFORMIN HYDROCHLORIDE 500 MILLIGRAM(S): 850 TABLET ORAL at 09:43

## 2024-02-12 RX ADMIN — LISINOPRIL 20 MILLIGRAM(S): 2.5 TABLET ORAL at 09:44

## 2024-02-12 RX ADMIN — Medication 1 APPLICATION(S): at 09:46

## 2024-02-12 RX ADMIN — CARBIDOPA AND LEVODOPA 1 TABLET(S): 25; 100 TABLET ORAL at 16:59

## 2024-02-12 RX ADMIN — VALACYCLOVIR 1000 MILLIGRAM(S): 500 TABLET, FILM COATED ORAL at 09:44

## 2024-02-12 RX ADMIN — QUETIAPINE FUMARATE 50 MILLIGRAM(S): 200 TABLET, FILM COATED ORAL at 12:44

## 2024-02-12 RX ADMIN — DIVALPROEX SODIUM 500 MILLIGRAM(S): 500 TABLET, DELAYED RELEASE ORAL at 16:59

## 2024-02-12 RX ADMIN — Medication 0.5 MILLIGRAM(S): at 09:35

## 2024-02-12 RX ADMIN — DIVALPROEX SODIUM 500 MILLIGRAM(S): 500 TABLET, DELAYED RELEASE ORAL at 12:44

## 2024-02-12 RX ADMIN — Medication 1 TABLET(S): at 09:43

## 2024-02-12 RX ADMIN — CARBIDOPA AND LEVODOPA 1 TABLET(S): 25; 100 TABLET ORAL at 12:44

## 2024-02-12 RX ADMIN — HALOPERIDOL DECANOATE 5 MILLIGRAM(S): 100 INJECTION INTRAMUSCULAR at 09:43

## 2024-02-12 RX ADMIN — CARBIDOPA AND LEVODOPA 1 TABLET(S): 25; 100 TABLET ORAL at 09:43

## 2024-02-12 RX ADMIN — POLYETHYLENE GLYCOL 3350 8.5 GRAM(S): 17 POWDER, FOR SOLUTION ORAL at 09:43

## 2024-02-12 RX ADMIN — Medication 1 APPLICATION(S): at 09:47

## 2024-02-12 RX ADMIN — Medication 0.5 MILLIGRAM(S): at 16:59

## 2024-02-12 RX ADMIN — QUETIAPINE FUMARATE 50 MILLIGRAM(S): 200 TABLET, FILM COATED ORAL at 09:45

## 2024-02-12 RX ADMIN — DIVALPROEX SODIUM 500 MILLIGRAM(S): 500 TABLET, DELAYED RELEASE ORAL at 09:44

## 2024-02-12 RX ADMIN — METFORMIN HYDROCHLORIDE 500 MILLIGRAM(S): 850 TABLET ORAL at 21:31

## 2024-02-12 RX ADMIN — HALOPERIDOL DECANOATE 5 MILLIGRAM(S): 100 INJECTION INTRAMUSCULAR at 16:58

## 2024-02-12 RX ADMIN — VALACYCLOVIR 1000 MILLIGRAM(S): 500 TABLET, FILM COATED ORAL at 21:30

## 2024-02-12 RX ADMIN — SENNA PLUS 1 TABLET(S): 8.6 TABLET ORAL at 21:31

## 2024-02-12 RX ADMIN — Medication 50 MILLIGRAM(S): at 09:43

## 2024-02-12 NOTE — BH INPATIENT PSYCHIATRY PROGRESS NOTE - NSBHASSESSSUMMFT_PSY_ALL_CORE
2/5 Patient showing some improving trend will give more time, if needs more medication titration due to ongoing symptoms will increase Seroquel not haldol  2/6 Improving cont meds, offered prn suppository and simethicone  2/7 Slowly improving will cont current meds, eval need for further increase Seroquel  2/8 Partial response continue meds except will increase Seroquel as was on 250mg/d PTA  2/9 Overall gains, still regresses and becomes difficult to manage will increase Seroquel to 200mg/d in divided dosing  2/12 Balta marti since started on additional mid day, cont other meds w/o change

## 2024-02-12 NOTE — BH INPATIENT PSYCHIATRY PROGRESS NOTE - NSBHFUPINTERVALHXFT_PSY_A_CORE
Patient seen for schizoaffective. No prn overnight Some trend towards  being calmer Eating, okay sleep fair. Compliant with meds.VSS.

## 2024-02-12 NOTE — BH INPATIENT PSYCHIATRY PROGRESS NOTE - CURRENT MEDICATION
MEDICATIONS  (STANDING):  ammonium lactate 12% Lotion 1 Application(s) Topical two times a day  carbidopa/levodopa  25/100 1 Tablet(s) Oral <User Schedule>  clonazePAM Oral Disintegrating Tablet 0.5 milliGRAM(s) Oral <User Schedule>  dextrose 5%. 1000 milliLiter(s) (50 mL/Hr) IV Continuous <Continuous>  dextrose 5%. 1000 milliLiter(s) (100 mL/Hr) IV Continuous <Continuous>  dextrose 50% Injectable 25 Gram(s) IV Push once  dextrose 50% Injectable 25 Gram(s) IV Push once  dextrose 50% Injectable 12.5 Gram(s) IV Push once  divalproex Sprinkle 500 milliGRAM(s) Oral <User Schedule>  glucagon  Injectable 1 milliGRAM(s) IntraMuscular once  haloperidol     Tablet 5 milliGRAM(s) Oral <User Schedule>  hemorrhoidal Ointment 1 Application(s) Rectal daily  hydrocortisone 2.5% Ointment 1 Application(s) Topical two times a day  insulin lispro (ADMELOG) corrective regimen sliding scale   SubCutaneous three times a day before meals  levothyroxine 75 MICROGram(s) Oral daily  lisinopril 20 milliGRAM(s) Oral daily  metFORMIN 500 milliGRAM(s) Oral two times a day  metoprolol succinate ER 50 milliGRAM(s) Oral daily  multivitamin 1 Tablet(s) Oral <User Schedule>  polyethylene glycol 3350 8.5 Gram(s) Oral daily  QUEtiapine 50 milliGRAM(s) Oral <User Schedule>  QUEtiapine 150 milliGRAM(s) Oral at bedtime  senna 1 Tablet(s) Oral at bedtime  valACYclovir 1000 milliGRAM(s) Oral every 12 hours    MEDICATIONS  (PRN):  acetaminophen     Tablet .. 650 milliGRAM(s) Oral every 6 hours PRN Mild Pain (1 - 3)  albuterol    90 MICROgram(s) HFA Inhaler 2 Puff(s) Inhalation every 6 hours PRN Shortness of Breath and/or Wheezing  aluminum hydroxide/magnesium hydroxide/simethicone Suspension 30 milliLiter(s) Oral every 6 hours PRN Dyspepsia  bisacodyl Suppository 10 milliGRAM(s) Rectal daily PRN constipation  dextrose Oral Gel 15 Gram(s) Oral once PRN Blood Glucose LESS THAN 70 milliGRAM(s)/deciliter  haloperidol     Tablet 5 milliGRAM(s) Oral every 6 hours PRN agitation  haloperidol    Injectable 5 milliGRAM(s) IntraMuscular once PRN severe agitation  haloperidol    Injectable 5 milliGRAM(s) IntraMuscular once PRN Severe Agitation  LORazepam     Tablet 1 milliGRAM(s) Oral every 6 hours PRN agitation  LORazepam   Injectable 1 milliGRAM(s) IntraMuscular once PRN Severe agitation  simethicone 80 milliGRAM(s) Chew every 8 hours PRN gas

## 2024-02-12 NOTE — BH INPATIENT PSYCHIATRY PROGRESS NOTE - PRN MEDS
MEDICATIONS  (PRN):  acetaminophen     Tablet .. 650 milliGRAM(s) Oral every 6 hours PRN Mild Pain (1 - 3)  albuterol    90 MICROgram(s) HFA Inhaler 2 Puff(s) Inhalation every 6 hours PRN Shortness of Breath and/or Wheezing  aluminum hydroxide/magnesium hydroxide/simethicone Suspension 30 milliLiter(s) Oral every 6 hours PRN Dyspepsia  bisacodyl Suppository 10 milliGRAM(s) Rectal daily PRN constipation  dextrose Oral Gel 15 Gram(s) Oral once PRN Blood Glucose LESS THAN 70 milliGRAM(s)/deciliter  haloperidol     Tablet 5 milliGRAM(s) Oral every 6 hours PRN agitation  haloperidol    Injectable 5 milliGRAM(s) IntraMuscular once PRN severe agitation  haloperidol    Injectable 5 milliGRAM(s) IntraMuscular once PRN Severe Agitation  LORazepam     Tablet 1 milliGRAM(s) Oral every 6 hours PRN agitation  LORazepam   Injectable 1 milliGRAM(s) IntraMuscular once PRN Severe agitation  simethicone 80 milliGRAM(s) Chew every 8 hours PRN gas

## 2024-02-12 NOTE — BH INPATIENT PSYCHIATRY PROGRESS NOTE - NSBHCHARTREVIEWVS_PSY_A_CORE FT
Vital Signs Last 24 Hrs  T(C): --  T(F): --  HR: 85 (02-12-24 @ 09:35) (85 - 85)  BP: 133/75 (02-12-24 @ 09:35) (133/75 - 133/75)  BP(mean): --  RR: --  SpO2: --    Orthostatic VS  02-11-24 @ 13:01  Lying BP: --/-- HR: --  Sitting BP: 132/76 HR: 80  Standing BP: 122/76 HR: --  Site: upper left arm  Mode: auscultated w/ stethoscope  Orthostatic VS  02-11-24 @ 08:17  Lying BP: --/-- HR: --  Sitting BP: 130/78 HR: 84  Standing BP: 132/70 HR: 80  Site: --  Mode: --

## 2024-02-12 NOTE — BH INPATIENT PSYCHIATRY PROGRESS NOTE - NSBHMETABOLIC_PSY_ALL_CORE_FT
BMI: BMI (kg/m2): 30.2 (01-15-24 @ 12:52)  HbA1c: A1C with Estimated Average Glucose Result: 7.6 % (01-12-24 @ 12:40)    Glucose: POCT Blood Glucose.: 167 mg/dL (02-12-24 @ 11:47)    BP: 133/75 (02-12-24 @ 09:35) (133/75 - 133/75)Vital Signs Last 24 Hrs  T(C): --  T(F): --  HR: 85 (02-12-24 @ 09:35) (85 - 85)  BP: 133/75 (02-12-24 @ 09:35) (133/75 - 133/75)  BP(mean): --  RR: --  SpO2: --    Orthostatic VS  02-11-24 @ 13:01  Lying BP: --/-- HR: --  Sitting BP: 132/76 HR: 80  Standing BP: 122/76 HR: --  Site: upper left arm  Mode: auscultated w/ stethoscope  Orthostatic VS  02-11-24 @ 08:17  Lying BP: --/-- HR: --  Sitting BP: 130/78 HR: 84  Standing BP: 132/70 HR: 80  Site: --  Mode: --    Lipid Panel:

## 2024-02-13 LAB
GLUCOSE BLDC GLUCOMTR-MCNC: 130 MG/DL — HIGH (ref 70–99)
GLUCOSE BLDC GLUCOMTR-MCNC: 132 MG/DL — HIGH (ref 70–99)
GLUCOSE BLDC GLUCOMTR-MCNC: 147 MG/DL — HIGH (ref 70–99)
GLUCOSE BLDC GLUCOMTR-MCNC: 188 MG/DL — HIGH (ref 70–99)

## 2024-02-13 PROCEDURE — 99231 SBSQ HOSP IP/OBS SF/LOW 25: CPT

## 2024-02-13 RX ORDER — CLONAZEPAM 1 MG
0.5 TABLET ORAL
Refills: 0 | Status: DISCONTINUED | OUTPATIENT
Start: 2024-02-13 | End: 2024-02-20

## 2024-02-13 RX ADMIN — Medication 1 APPLICATION(S): at 09:55

## 2024-02-13 RX ADMIN — QUETIAPINE FUMARATE 150 MILLIGRAM(S): 200 TABLET, FILM COATED ORAL at 20:46

## 2024-02-13 RX ADMIN — SENNA PLUS 1 TABLET(S): 8.6 TABLET ORAL at 20:47

## 2024-02-13 RX ADMIN — HALOPERIDOL DECANOATE 5 MILLIGRAM(S): 100 INJECTION INTRAMUSCULAR at 09:52

## 2024-02-13 RX ADMIN — VALACYCLOVIR 1000 MILLIGRAM(S): 500 TABLET, FILM COATED ORAL at 09:49

## 2024-02-13 RX ADMIN — CARBIDOPA AND LEVODOPA 1 TABLET(S): 25; 100 TABLET ORAL at 16:39

## 2024-02-13 RX ADMIN — CARBIDOPA AND LEVODOPA 1 TABLET(S): 25; 100 TABLET ORAL at 12:06

## 2024-02-13 RX ADMIN — Medication 0.5 MILLIGRAM(S): at 09:53

## 2024-02-13 RX ADMIN — Medication 1 APPLICATION(S): at 09:54

## 2024-02-13 RX ADMIN — METFORMIN HYDROCHLORIDE 500 MILLIGRAM(S): 850 TABLET ORAL at 20:46

## 2024-02-13 RX ADMIN — DIVALPROEX SODIUM 500 MILLIGRAM(S): 500 TABLET, DELAYED RELEASE ORAL at 09:50

## 2024-02-13 RX ADMIN — DIVALPROEX SODIUM 500 MILLIGRAM(S): 500 TABLET, DELAYED RELEASE ORAL at 12:07

## 2024-02-13 RX ADMIN — Medication 75 MICROGRAM(S): at 06:09

## 2024-02-13 RX ADMIN — Medication 50 MILLIGRAM(S): at 09:49

## 2024-02-13 RX ADMIN — VALACYCLOVIR 1000 MILLIGRAM(S): 500 TABLET, FILM COATED ORAL at 20:47

## 2024-02-13 RX ADMIN — Medication 1 TABLET(S): at 09:53

## 2024-02-13 RX ADMIN — HALOPERIDOL DECANOATE 5 MILLIGRAM(S): 100 INJECTION INTRAMUSCULAR at 16:38

## 2024-02-13 RX ADMIN — Medication 0.5 MILLIGRAM(S): at 16:39

## 2024-02-13 RX ADMIN — QUETIAPINE FUMARATE 50 MILLIGRAM(S): 200 TABLET, FILM COATED ORAL at 12:07

## 2024-02-13 RX ADMIN — QUETIAPINE FUMARATE 50 MILLIGRAM(S): 200 TABLET, FILM COATED ORAL at 09:51

## 2024-02-13 RX ADMIN — DIVALPROEX SODIUM 500 MILLIGRAM(S): 500 TABLET, DELAYED RELEASE ORAL at 16:38

## 2024-02-13 RX ADMIN — LISINOPRIL 20 MILLIGRAM(S): 2.5 TABLET ORAL at 09:48

## 2024-02-13 RX ADMIN — CARBIDOPA AND LEVODOPA 1 TABLET(S): 25; 100 TABLET ORAL at 09:52

## 2024-02-13 RX ADMIN — POLYETHYLENE GLYCOL 3350 8.5 GRAM(S): 17 POWDER, FOR SOLUTION ORAL at 09:50

## 2024-02-13 RX ADMIN — Medication 30 MILLILITER(S): at 16:45

## 2024-02-13 RX ADMIN — METFORMIN HYDROCHLORIDE 500 MILLIGRAM(S): 850 TABLET ORAL at 09:48

## 2024-02-13 NOTE — BH INPATIENT PSYCHIATRY PROGRESS NOTE - NSBHCHARTREVIEWVS_PSY_A_CORE FT
Vital Signs Last 24 Hrs  T(C): 36.7 (02-13-24 @ 08:03), Max: 36.7 (02-13-24 @ 08:03)  T(F): 98 (02-13-24 @ 08:03), Max: 98 (02-13-24 @ 08:03)  HR: --  BP: --  BP(mean): --  RR: --  SpO2: --    Orthostatic VS  02-13-24 @ 08:03  Lying BP: --/-- HR: --  Sitting BP: 126/80 HR: 78  Standing BP: 130/85 HR: 81  Site: --  Mode: --  Orthostatic VS  02-11-24 @ 13:01  Lying BP: --/-- HR: --  Sitting BP: 132/76 HR: 80  Standing BP: 122/76 HR: --  Site: upper left arm  Mode: auscultated w/ stethoscope

## 2024-02-13 NOTE — BH INPATIENT PSYCHIATRY PROGRESS NOTE - CURRENT MEDICATION
MEDICATIONS  (STANDING):  ammonium lactate 12% Lotion 1 Application(s) Topical two times a day  carbidopa/levodopa  25/100 1 Tablet(s) Oral <User Schedule>  clonazePAM Oral Disintegrating Tablet 0.5 milliGRAM(s) Oral <User Schedule>  dextrose 5%. 1000 milliLiter(s) (50 mL/Hr) IV Continuous <Continuous>  dextrose 5%. 1000 milliLiter(s) (100 mL/Hr) IV Continuous <Continuous>  dextrose 50% Injectable 25 Gram(s) IV Push once  dextrose 50% Injectable 12.5 Gram(s) IV Push once  dextrose 50% Injectable 25 Gram(s) IV Push once  divalproex Sprinkle 500 milliGRAM(s) Oral <User Schedule>  glucagon  Injectable 1 milliGRAM(s) IntraMuscular once  haloperidol     Tablet 5 milliGRAM(s) Oral <User Schedule>  hemorrhoidal Ointment 1 Application(s) Rectal daily  hydrocortisone 2.5% Ointment 1 Application(s) Topical two times a day  insulin lispro (ADMELOG) corrective regimen sliding scale   SubCutaneous three times a day before meals  levothyroxine 75 MICROGram(s) Oral daily  lisinopril 20 milliGRAM(s) Oral daily  metFORMIN 500 milliGRAM(s) Oral two times a day  metoprolol succinate ER 50 milliGRAM(s) Oral daily  multivitamin 1 Tablet(s) Oral <User Schedule>  polyethylene glycol 3350 8.5 Gram(s) Oral daily  QUEtiapine 50 milliGRAM(s) Oral <User Schedule>  QUEtiapine 150 milliGRAM(s) Oral at bedtime  senna 1 Tablet(s) Oral at bedtime  valACYclovir 1000 milliGRAM(s) Oral every 12 hours    MEDICATIONS  (PRN):  acetaminophen     Tablet .. 650 milliGRAM(s) Oral every 6 hours PRN Mild Pain (1 - 3)  albuterol    90 MICROgram(s) HFA Inhaler 2 Puff(s) Inhalation every 6 hours PRN Shortness of Breath and/or Wheezing  aluminum hydroxide/magnesium hydroxide/simethicone Suspension 30 milliLiter(s) Oral every 6 hours PRN Dyspepsia  bisacodyl Suppository 10 milliGRAM(s) Rectal daily PRN constipation  dextrose Oral Gel 15 Gram(s) Oral once PRN Blood Glucose LESS THAN 70 milliGRAM(s)/deciliter  haloperidol     Tablet 5 milliGRAM(s) Oral every 6 hours PRN agitation  haloperidol    Injectable 5 milliGRAM(s) IntraMuscular once PRN severe agitation  haloperidol    Injectable 5 milliGRAM(s) IntraMuscular once PRN Severe Agitation  LORazepam     Tablet 1 milliGRAM(s) Oral every 6 hours PRN agitation  LORazepam   Injectable 1 milliGRAM(s) IntraMuscular once PRN Severe agitation  simethicone 80 milliGRAM(s) Chew every 8 hours PRN gas

## 2024-02-13 NOTE — BH INPATIENT PSYCHIATRY PROGRESS NOTE - MSE UNSTRUCTURED FT
Pt appears stated age, awake,  alert, poor relatedness  though sl more able to engage her. Stable gait noted.  Speech hard to understand due to poor articulation and edentulous. Has tremor R more than L but no evidence of rigidity, walks well, no shuffling or festination.  Mood irritable, affect labile though less severe No suicidal/homicidal ideas/plan expressed. Religiously focused only wants  for example meds from spiritually pure nursesThinking disjointed. Cannot assess cognition. Poor insight and judgement, is impulsive.

## 2024-02-13 NOTE — BH INPATIENT PSYCHIATRY PROGRESS NOTE - NSBHMETABOLIC_PSY_ALL_CORE_FT
BMI: BMI (kg/m2): 30.2 (01-15-24 @ 12:52)  HbA1c: A1C with Estimated Average Glucose Result: 7.6 % (01-12-24 @ 12:40)    Glucose: POCT Blood Glucose.: 147 mg/dL (02-13-24 @ 07:47)    BP: 133/75 (02-12-24 @ 09:35) (133/75 - 133/75)Vital Signs Last 24 Hrs  T(C): 36.7 (02-13-24 @ 08:03), Max: 36.7 (02-13-24 @ 08:03)  T(F): 98 (02-13-24 @ 08:03), Max: 98 (02-13-24 @ 08:03)  HR: --  BP: --  BP(mean): --  RR: --  SpO2: --    Orthostatic VS  02-13-24 @ 08:03  Lying BP: --/-- HR: --  Sitting BP: 126/80 HR: 78  Standing BP: 130/85 HR: 81  Site: --  Mode: --  Orthostatic VS  02-11-24 @ 13:01  Lying BP: --/-- HR: --  Sitting BP: 132/76 HR: 80  Standing BP: 122/76 HR: --  Site: upper left arm  Mode: auscultated w/ stethoscope    Lipid Panel:

## 2024-02-13 NOTE — BH INPATIENT PSYCHIATRY PROGRESS NOTE - NSBHASSESSSUMMFT_PSY_ALL_CORE
2/5 Patient showing some improving trend will give more time, if needs more medication titration due to ongoing symptoms will increase Seroquel not haldol  2/6 Improving cont meds, offered prn suppository and simethicone  2/7 Slowly improving will cont current meds, eval need for further increase Seroquel  2/8 Partial response continue meds except will increase Seroquel as was on 250mg/d PTA  2/9 Overall gains, still regresses and becomes difficult to manage will increase Seroquel to 200mg/d in divided dosing  2/12 A little calmer since started on additional mid day, cont other meds w/o change  2/13 Patient better still disorganized, hyper Restoration but less agitated, ocnt current treatment

## 2024-02-14 LAB
GLUCOSE BLDC GLUCOMTR-MCNC: 115 MG/DL — HIGH (ref 70–99)
GLUCOSE BLDC GLUCOMTR-MCNC: 170 MG/DL — HIGH (ref 70–99)

## 2024-02-14 PROCEDURE — 99231 SBSQ HOSP IP/OBS SF/LOW 25: CPT

## 2024-02-14 RX ADMIN — Medication 75 MICROGRAM(S): at 06:20

## 2024-02-14 RX ADMIN — QUETIAPINE FUMARATE 50 MILLIGRAM(S): 200 TABLET, FILM COATED ORAL at 13:14

## 2024-02-14 RX ADMIN — QUETIAPINE FUMARATE 150 MILLIGRAM(S): 200 TABLET, FILM COATED ORAL at 22:09

## 2024-02-14 RX ADMIN — Medication 0.5 MILLIGRAM(S): at 18:23

## 2024-02-14 RX ADMIN — METFORMIN HYDROCHLORIDE 500 MILLIGRAM(S): 850 TABLET ORAL at 22:08

## 2024-02-14 RX ADMIN — DIVALPROEX SODIUM 500 MILLIGRAM(S): 500 TABLET, DELAYED RELEASE ORAL at 18:23

## 2024-02-14 RX ADMIN — VALACYCLOVIR 1000 MILLIGRAM(S): 500 TABLET, FILM COATED ORAL at 22:08

## 2024-02-14 RX ADMIN — CARBIDOPA AND LEVODOPA 1 TABLET(S): 25; 100 TABLET ORAL at 18:22

## 2024-02-14 RX ADMIN — CARBIDOPA AND LEVODOPA 1 TABLET(S): 25; 100 TABLET ORAL at 13:15

## 2024-02-14 RX ADMIN — SENNA PLUS 1 TABLET(S): 8.6 TABLET ORAL at 22:08

## 2024-02-14 RX ADMIN — DIVALPROEX SODIUM 500 MILLIGRAM(S): 500 TABLET, DELAYED RELEASE ORAL at 13:14

## 2024-02-14 NOTE — BH INPATIENT PSYCHIATRY PROGRESS NOTE - NSBHCHARTREVIEWVS_PSY_A_CORE FT
Vital Signs Last 24 Hrs  T(C): --  T(F): --  HR: --  BP: --  BP(mean): --  RR: --  SpO2: --    Orthostatic VS  02-13-24 @ 08:03  Lying BP: --/-- HR: --  Sitting BP: 126/80 HR: 78  Standing BP: 130/85 HR: 81  Site: --  Mode: --

## 2024-02-14 NOTE — BH INPATIENT PSYCHIATRY PROGRESS NOTE - NSBHMETABOLIC_PSY_ALL_CORE_FT
BMI: BMI (kg/m2): 30.2 (01-15-24 @ 12:52)  HbA1c: A1C with Estimated Average Glucose Result: 7.6 % (01-12-24 @ 12:40)    Glucose: POCT Blood Glucose.: 115 mg/dL (02-14-24 @ 12:24)    BP: 133/75 (02-12-24 @ 09:35) (133/75 - 133/75)Vital Signs Last 24 Hrs  T(C): --  T(F): --  HR: --  BP: --  BP(mean): --  RR: --  SpO2: --    Orthostatic VS  02-13-24 @ 08:03  Lying BP: --/-- HR: --  Sitting BP: 126/80 HR: 78  Standing BP: 130/85 HR: 81  Site: --  Mode: --    Lipid Panel:

## 2024-02-14 NOTE — BH INPATIENT PSYCHIATRY PROGRESS NOTE - NSBHFUPINTERVALHXFT_PSY_A_CORE
Patient seen for schizoaffective. No prn overnight Some trend towards  being calmer, still with intrusiveness, impaired boundaries Eating, okay sleep fair. Compliant with meds.Refused vitals today

## 2024-02-14 NOTE — BH INPATIENT PSYCHIATRY PROGRESS NOTE - NSBHASSESSSUMMFT_PSY_ALL_CORE
2/5 Patient showing some improving trend will give more time, if needs more medication titration due to ongoing symptoms will increase Seroquel not haldol  2/6 Improving cont meds, offered prn suppository and simethicone  2/7 Slowly improving will cont current meds, eval need for further increase Seroquel  2/8 Partial response continue meds except will increase Seroquel as was on 250mg/d PTA  2/9 Overall gains, still regresses and becomes difficult to manage will increase Seroquel to 200mg/d in divided dosing  2/12 A little calmer since started on additional mid day, cont other meds w/o change  2/13 Patient better still disorganized, hyper Yazidi but less agitated, cont current treatment  2/14 Improved globally with some symtpom variability. COnt current med consider further increment Seroquel

## 2024-02-15 PROCEDURE — 99231 SBSQ HOSP IP/OBS SF/LOW 25: CPT

## 2024-02-15 RX ADMIN — HALOPERIDOL DECANOATE 5 MILLIGRAM(S): 100 INJECTION INTRAMUSCULAR at 09:57

## 2024-02-15 RX ADMIN — CARBIDOPA AND LEVODOPA 1 TABLET(S): 25; 100 TABLET ORAL at 13:05

## 2024-02-15 RX ADMIN — SENNA PLUS 1 TABLET(S): 8.6 TABLET ORAL at 20:50

## 2024-02-15 RX ADMIN — DIVALPROEX SODIUM 500 MILLIGRAM(S): 500 TABLET, DELAYED RELEASE ORAL at 17:18

## 2024-02-15 RX ADMIN — CARBIDOPA AND LEVODOPA 1 TABLET(S): 25; 100 TABLET ORAL at 17:18

## 2024-02-15 RX ADMIN — METFORMIN HYDROCHLORIDE 500 MILLIGRAM(S): 850 TABLET ORAL at 09:56

## 2024-02-15 RX ADMIN — Medication 75 MICROGRAM(S): at 07:05

## 2024-02-15 RX ADMIN — QUETIAPINE FUMARATE 50 MILLIGRAM(S): 200 TABLET, FILM COATED ORAL at 13:05

## 2024-02-15 RX ADMIN — Medication 0.5 MILLIGRAM(S): at 17:18

## 2024-02-15 RX ADMIN — DIVALPROEX SODIUM 500 MILLIGRAM(S): 500 TABLET, DELAYED RELEASE ORAL at 13:05

## 2024-02-15 RX ADMIN — METFORMIN HYDROCHLORIDE 500 MILLIGRAM(S): 850 TABLET ORAL at 20:50

## 2024-02-15 RX ADMIN — QUETIAPINE FUMARATE 50 MILLIGRAM(S): 200 TABLET, FILM COATED ORAL at 09:57

## 2024-02-15 RX ADMIN — VALACYCLOVIR 1000 MILLIGRAM(S): 500 TABLET, FILM COATED ORAL at 20:50

## 2024-02-15 RX ADMIN — Medication 50 MILLIGRAM(S): at 09:56

## 2024-02-15 RX ADMIN — CARBIDOPA AND LEVODOPA 1 TABLET(S): 25; 100 TABLET ORAL at 09:56

## 2024-02-15 RX ADMIN — HALOPERIDOL DECANOATE 5 MILLIGRAM(S): 100 INJECTION INTRAMUSCULAR at 17:18

## 2024-02-15 RX ADMIN — LISINOPRIL 20 MILLIGRAM(S): 2.5 TABLET ORAL at 09:57

## 2024-02-15 RX ADMIN — QUETIAPINE FUMARATE 150 MILLIGRAM(S): 200 TABLET, FILM COATED ORAL at 20:50

## 2024-02-15 RX ADMIN — DIVALPROEX SODIUM 500 MILLIGRAM(S): 500 TABLET, DELAYED RELEASE ORAL at 09:56

## 2024-02-15 RX ADMIN — Medication 0.5 MILLIGRAM(S): at 09:56

## 2024-02-15 RX ADMIN — VALACYCLOVIR 1000 MILLIGRAM(S): 500 TABLET, FILM COATED ORAL at 09:57

## 2024-02-15 NOTE — BH INPATIENT PSYCHIATRY PROGRESS NOTE - NSBHMETABOLIC_PSY_ALL_CORE_FT
BMI: BMI (kg/m2): 30.2 (01-15-24 @ 12:52)  HbA1c: A1C with Estimated Average Glucose Result: 7.6 % (01-12-24 @ 12:40)    Glucose: POCT Blood Glucose.: 115 mg/dL (02-14-24 @ 12:24)    BP: --Vital Signs Last 24 Hrs  T(C): --  T(F): --  HR: --  BP: --  BP(mean): --  RR: --  SpO2: --      Lipid Panel:

## 2024-02-15 NOTE — BH INPATIENT PSYCHIATRY PROGRESS NOTE - NSBHFUPINTERVALHXFT_PSY_A_CORE
Patient seen for schizoaffective. No prn overnight Some trend towards  being calmer, still with intrusiveness, impaired boundaries Eating, okay sleep fair. Compliant with meds.Refused vitals today. Was coming close to a peer to try to show him something in a newspaper

## 2024-02-15 NOTE — BH INPATIENT PSYCHIATRY PROGRESS NOTE - NSBHASSESSSUMMFT_PSY_ALL_CORE
2/5 Patient showing some improving trend will give more time, if needs more medication titration due to ongoing symptoms will increase Seroquel not haldol  2/6 Improving cont meds, offered prn suppository and simethicone  2/7 Slowly improving will cont current meds, eval need for further increase Seroquel  2/8 Partial response continue meds except will increase Seroquel as was on 250mg/d PTA  2/9 Overall gains, still regresses and becomes difficult to manage will increase Seroquel to 200mg/d in divided dosing  2/12 A little calmer since started on additional mid day, cont other meds w/o change  2/13 Patient better still disorganized, hyper Methodist but less agitated, cont current treatment  2/14 Improved globally with some symptom variability. Cont current med consider further increment Seroquel  2/15 Improved cont current regimen for now

## 2024-02-15 NOTE — BH INPATIENT PSYCHIATRY PROGRESS NOTE - MSE UNSTRUCTURED FT
Pt appears stated age, awake,  alert, poor relatedness  though sl more able to engage her. Stable gait noted.  Speech hard to understand due to poor articulation and edentulous. Has tremor R more than L but no evidence of rigidity, walks well, no shuffling or festination.  Mood irritable, affect labile though less severe No suicidal/homicidal ideas/plan expressed. Religiously focused Thinking disjointed. Cannot assess cognition. Poor insight and judgement, is impulsive but more directable

## 2024-02-16 LAB
GLUCOSE BLDC GLUCOMTR-MCNC: 130 MG/DL — HIGH (ref 70–99)
GLUCOSE BLDC GLUCOMTR-MCNC: 158 MG/DL — HIGH (ref 70–99)

## 2024-02-16 PROCEDURE — 99231 SBSQ HOSP IP/OBS SF/LOW 25: CPT

## 2024-02-16 RX ADMIN — VALACYCLOVIR 1000 MILLIGRAM(S): 500 TABLET, FILM COATED ORAL at 21:10

## 2024-02-16 RX ADMIN — CARBIDOPA AND LEVODOPA 1 TABLET(S): 25; 100 TABLET ORAL at 18:02

## 2024-02-16 RX ADMIN — QUETIAPINE FUMARATE 50 MILLIGRAM(S): 200 TABLET, FILM COATED ORAL at 13:43

## 2024-02-16 RX ADMIN — Medication 50 MILLIGRAM(S): at 09:02

## 2024-02-16 RX ADMIN — DIVALPROEX SODIUM 500 MILLIGRAM(S): 500 TABLET, DELAYED RELEASE ORAL at 09:03

## 2024-02-16 RX ADMIN — CARBIDOPA AND LEVODOPA 1 TABLET(S): 25; 100 TABLET ORAL at 09:01

## 2024-02-16 RX ADMIN — CARBIDOPA AND LEVODOPA 1 TABLET(S): 25; 100 TABLET ORAL at 13:43

## 2024-02-16 RX ADMIN — VALACYCLOVIR 1000 MILLIGRAM(S): 500 TABLET, FILM COATED ORAL at 09:01

## 2024-02-16 RX ADMIN — DIVALPROEX SODIUM 500 MILLIGRAM(S): 500 TABLET, DELAYED RELEASE ORAL at 18:02

## 2024-02-16 RX ADMIN — QUETIAPINE FUMARATE 150 MILLIGRAM(S): 200 TABLET, FILM COATED ORAL at 21:10

## 2024-02-16 RX ADMIN — SENNA PLUS 1 TABLET(S): 8.6 TABLET ORAL at 21:10

## 2024-02-16 RX ADMIN — Medication 0.5 MILLIGRAM(S): at 18:03

## 2024-02-16 RX ADMIN — LISINOPRIL 20 MILLIGRAM(S): 2.5 TABLET ORAL at 09:02

## 2024-02-16 RX ADMIN — METFORMIN HYDROCHLORIDE 500 MILLIGRAM(S): 850 TABLET ORAL at 09:03

## 2024-02-16 RX ADMIN — Medication 75 MICROGRAM(S): at 06:34

## 2024-02-16 RX ADMIN — DIVALPROEX SODIUM 500 MILLIGRAM(S): 500 TABLET, DELAYED RELEASE ORAL at 13:44

## 2024-02-16 RX ADMIN — METFORMIN HYDROCHLORIDE 500 MILLIGRAM(S): 850 TABLET ORAL at 21:11

## 2024-02-16 RX ADMIN — Medication 0.5 MILLIGRAM(S): at 09:00

## 2024-02-16 RX ADMIN — Medication 1 TABLET(S): at 09:00

## 2024-02-16 RX ADMIN — QUETIAPINE FUMARATE 50 MILLIGRAM(S): 200 TABLET, FILM COATED ORAL at 09:02

## 2024-02-16 NOTE — BH INPATIENT PSYCHIATRY PROGRESS NOTE - NSBHFUPINTERVALHXFT_PSY_A_CORE
Patient seen for schizoaffective. No prn overnight Some trend towards  being calmer, still with intrusiveness, impaired boundaries Eating, okay sleep fair. Compliant with meds.VSS

## 2024-02-16 NOTE — BH INPATIENT PSYCHIATRY PROGRESS NOTE - NSBHMETABOLIC_PSY_ALL_CORE_FT
BMI: BMI (kg/m2): 30.2 (01-15-24 @ 12:52)  HbA1c: A1C with Estimated Average Glucose Result: 7.6 % (01-12-24 @ 12:40)    Glucose: POCT Blood Glucose.: 130 mg/dL (02-16-24 @ 12:14)    BP: --Vital Signs Last 24 Hrs  T(C): --  T(F): --  HR: --  BP: --  BP(mean): --  RR: --  SpO2: --    Orthostatic VS  02-16-24 @ 09:11  Lying BP: --/-- HR: --  Sitting BP: 712/74 HR: 86  Standing BP: 128/79 HR: 91  Site: upper right arm  Mode: electronic    Lipid Panel:

## 2024-02-16 NOTE — BH INPATIENT PSYCHIATRY PROGRESS NOTE - NSBHCHARTREVIEWVS_PSY_A_CORE FT
Vital Signs Last 24 Hrs  T(C): --  T(F): --  HR: --  BP: --  BP(mean): --  RR: --  SpO2: --    Orthostatic VS  02-16-24 @ 09:11  Lying BP: --/-- HR: --  Sitting BP: 712/74 HR: 86  Standing BP: 128/79 HR: 91  Site: upper right arm  Mode: electronic

## 2024-02-17 LAB
GLUCOSE BLDC GLUCOMTR-MCNC: 127 MG/DL — HIGH (ref 70–99)
GLUCOSE BLDC GLUCOMTR-MCNC: 188 MG/DL — HIGH (ref 70–99)

## 2024-02-17 RX ADMIN — POLYETHYLENE GLYCOL 3350 8.5 GRAM(S): 17 POWDER, FOR SOLUTION ORAL at 08:45

## 2024-02-17 RX ADMIN — VALACYCLOVIR 1000 MILLIGRAM(S): 500 TABLET, FILM COATED ORAL at 08:46

## 2024-02-17 RX ADMIN — Medication 1 TABLET(S): at 08:47

## 2024-02-17 RX ADMIN — CARBIDOPA AND LEVODOPA 1 TABLET(S): 25; 100 TABLET ORAL at 08:46

## 2024-02-17 RX ADMIN — Medication 0.5 MILLIGRAM(S): at 08:46

## 2024-02-17 RX ADMIN — CARBIDOPA AND LEVODOPA 1 TABLET(S): 25; 100 TABLET ORAL at 17:32

## 2024-02-17 RX ADMIN — DIVALPROEX SODIUM 500 MILLIGRAM(S): 500 TABLET, DELAYED RELEASE ORAL at 08:46

## 2024-02-17 RX ADMIN — QUETIAPINE FUMARATE 50 MILLIGRAM(S): 200 TABLET, FILM COATED ORAL at 08:47

## 2024-02-17 RX ADMIN — QUETIAPINE FUMARATE 50 MILLIGRAM(S): 200 TABLET, FILM COATED ORAL at 13:17

## 2024-02-17 RX ADMIN — QUETIAPINE FUMARATE 150 MILLIGRAM(S): 200 TABLET, FILM COATED ORAL at 20:58

## 2024-02-17 RX ADMIN — METFORMIN HYDROCHLORIDE 500 MILLIGRAM(S): 850 TABLET ORAL at 20:58

## 2024-02-17 RX ADMIN — METFORMIN HYDROCHLORIDE 500 MILLIGRAM(S): 850 TABLET ORAL at 08:46

## 2024-02-17 RX ADMIN — HALOPERIDOL DECANOATE 5 MILLIGRAM(S): 100 INJECTION INTRAMUSCULAR at 17:32

## 2024-02-17 RX ADMIN — HALOPERIDOL DECANOATE 5 MILLIGRAM(S): 100 INJECTION INTRAMUSCULAR at 08:47

## 2024-02-17 RX ADMIN — Medication 50 MILLIGRAM(S): at 08:46

## 2024-02-17 RX ADMIN — DIVALPROEX SODIUM 500 MILLIGRAM(S): 500 TABLET, DELAYED RELEASE ORAL at 18:33

## 2024-02-17 RX ADMIN — Medication 75 MICROGRAM(S): at 06:42

## 2024-02-17 RX ADMIN — DIVALPROEX SODIUM 500 MILLIGRAM(S): 500 TABLET, DELAYED RELEASE ORAL at 13:17

## 2024-02-17 RX ADMIN — CARBIDOPA AND LEVODOPA 1 TABLET(S): 25; 100 TABLET ORAL at 13:17

## 2024-02-17 RX ADMIN — VALACYCLOVIR 1000 MILLIGRAM(S): 500 TABLET, FILM COATED ORAL at 20:58

## 2024-02-17 RX ADMIN — LISINOPRIL 20 MILLIGRAM(S): 2.5 TABLET ORAL at 08:47

## 2024-02-17 RX ADMIN — Medication 0.5 MILLIGRAM(S): at 17:32

## 2024-02-17 RX ADMIN — SENNA PLUS 1 TABLET(S): 8.6 TABLET ORAL at 20:58

## 2024-02-18 LAB
GLUCOSE BLDC GLUCOMTR-MCNC: 100 MG/DL — HIGH (ref 70–99)
GLUCOSE BLDC GLUCOMTR-MCNC: 113 MG/DL — HIGH (ref 70–99)
GLUCOSE BLDC GLUCOMTR-MCNC: 147 MG/DL — HIGH (ref 70–99)
GLUCOSE BLDC GLUCOMTR-MCNC: 96 MG/DL — SIGNIFICANT CHANGE UP (ref 70–99)

## 2024-02-18 RX ADMIN — QUETIAPINE FUMARATE 50 MILLIGRAM(S): 200 TABLET, FILM COATED ORAL at 12:54

## 2024-02-18 RX ADMIN — POLYETHYLENE GLYCOL 3350 8.5 GRAM(S): 17 POWDER, FOR SOLUTION ORAL at 08:25

## 2024-02-18 RX ADMIN — SENNA PLUS 1 TABLET(S): 8.6 TABLET ORAL at 20:58

## 2024-02-18 RX ADMIN — DIVALPROEX SODIUM 500 MILLIGRAM(S): 500 TABLET, DELAYED RELEASE ORAL at 17:42

## 2024-02-18 RX ADMIN — LISINOPRIL 20 MILLIGRAM(S): 2.5 TABLET ORAL at 08:24

## 2024-02-18 RX ADMIN — METFORMIN HYDROCHLORIDE 500 MILLIGRAM(S): 850 TABLET ORAL at 20:59

## 2024-02-18 RX ADMIN — Medication 1 MILLIGRAM(S): at 13:13

## 2024-02-18 RX ADMIN — Medication 1 TABLET(S): at 08:24

## 2024-02-18 RX ADMIN — HALOPERIDOL DECANOATE 5 MILLIGRAM(S): 100 INJECTION INTRAMUSCULAR at 08:25

## 2024-02-18 RX ADMIN — Medication 0.5 MILLIGRAM(S): at 17:43

## 2024-02-18 RX ADMIN — Medication 0.5 MILLIGRAM(S): at 08:24

## 2024-02-18 RX ADMIN — QUETIAPINE FUMARATE 50 MILLIGRAM(S): 200 TABLET, FILM COATED ORAL at 08:25

## 2024-02-18 RX ADMIN — VALACYCLOVIR 1000 MILLIGRAM(S): 500 TABLET, FILM COATED ORAL at 08:24

## 2024-02-18 RX ADMIN — VALACYCLOVIR 1000 MILLIGRAM(S): 500 TABLET, FILM COATED ORAL at 20:59

## 2024-02-18 RX ADMIN — DIVALPROEX SODIUM 500 MILLIGRAM(S): 500 TABLET, DELAYED RELEASE ORAL at 08:25

## 2024-02-18 RX ADMIN — METFORMIN HYDROCHLORIDE 500 MILLIGRAM(S): 850 TABLET ORAL at 08:25

## 2024-02-18 RX ADMIN — CARBIDOPA AND LEVODOPA 1 TABLET(S): 25; 100 TABLET ORAL at 08:56

## 2024-02-18 RX ADMIN — CARBIDOPA AND LEVODOPA 1 TABLET(S): 25; 100 TABLET ORAL at 13:13

## 2024-02-18 RX ADMIN — Medication 75 MICROGRAM(S): at 06:33

## 2024-02-18 RX ADMIN — Medication 50 MILLIGRAM(S): at 08:24

## 2024-02-18 RX ADMIN — HALOPERIDOL DECANOATE 5 MILLIGRAM(S): 100 INJECTION INTRAMUSCULAR at 17:43

## 2024-02-18 RX ADMIN — CARBIDOPA AND LEVODOPA 1 TABLET(S): 25; 100 TABLET ORAL at 17:43

## 2024-02-18 RX ADMIN — QUETIAPINE FUMARATE 150 MILLIGRAM(S): 200 TABLET, FILM COATED ORAL at 20:59

## 2024-02-18 RX ADMIN — DIVALPROEX SODIUM 500 MILLIGRAM(S): 500 TABLET, DELAYED RELEASE ORAL at 12:54

## 2024-02-18 NOTE — BH CHART NOTE - NSEVENTNOTEFT_PSY_ALL_CORE
SAUL called by nurse after patient reported bright red blood per rectum. Nurse had evaluated patient's pelvis and identified a cystocele which SAUL was also requested to evaluate. Per chart review, patient chronically has both hemorrhoids with occasional bleeding and a grade 4 cystocele evaluated by Obgyn and recommended to follow-up outpatient. SAUL evaluated cystocele and it was not ulcerated, infected, or actively bleeding. Recommended to continue routine care per prior Obgyn notes.

## 2024-02-19 LAB
GLUCOSE BLDC GLUCOMTR-MCNC: 114 MG/DL — HIGH (ref 70–99)
GLUCOSE BLDC GLUCOMTR-MCNC: 88 MG/DL — SIGNIFICANT CHANGE UP (ref 70–99)
GLUCOSE BLDC GLUCOMTR-MCNC: 99 MG/DL — SIGNIFICANT CHANGE UP (ref 70–99)

## 2024-02-19 RX ADMIN — METFORMIN HYDROCHLORIDE 500 MILLIGRAM(S): 850 TABLET ORAL at 20:57

## 2024-02-19 RX ADMIN — POLYETHYLENE GLYCOL 3350 8.5 GRAM(S): 17 POWDER, FOR SOLUTION ORAL at 09:34

## 2024-02-19 RX ADMIN — VALACYCLOVIR 1000 MILLIGRAM(S): 500 TABLET, FILM COATED ORAL at 09:34

## 2024-02-19 RX ADMIN — DIVALPROEX SODIUM 500 MILLIGRAM(S): 500 TABLET, DELAYED RELEASE ORAL at 17:36

## 2024-02-19 RX ADMIN — SENNA PLUS 1 TABLET(S): 8.6 TABLET ORAL at 20:58

## 2024-02-19 RX ADMIN — DIVALPROEX SODIUM 500 MILLIGRAM(S): 500 TABLET, DELAYED RELEASE ORAL at 09:33

## 2024-02-19 RX ADMIN — Medication 1 TABLET(S): at 09:34

## 2024-02-19 RX ADMIN — HALOPERIDOL DECANOATE 5 MILLIGRAM(S): 100 INJECTION INTRAMUSCULAR at 17:37

## 2024-02-19 RX ADMIN — CARBIDOPA AND LEVODOPA 1 TABLET(S): 25; 100 TABLET ORAL at 12:58

## 2024-02-19 RX ADMIN — Medication 0.5 MILLIGRAM(S): at 17:37

## 2024-02-19 RX ADMIN — METFORMIN HYDROCHLORIDE 500 MILLIGRAM(S): 850 TABLET ORAL at 09:33

## 2024-02-19 RX ADMIN — CARBIDOPA AND LEVODOPA 1 TABLET(S): 25; 100 TABLET ORAL at 09:33

## 2024-02-19 RX ADMIN — QUETIAPINE FUMARATE 50 MILLIGRAM(S): 200 TABLET, FILM COATED ORAL at 12:58

## 2024-02-19 RX ADMIN — QUETIAPINE FUMARATE 150 MILLIGRAM(S): 200 TABLET, FILM COATED ORAL at 20:57

## 2024-02-19 RX ADMIN — QUETIAPINE FUMARATE 50 MILLIGRAM(S): 200 TABLET, FILM COATED ORAL at 09:33

## 2024-02-19 RX ADMIN — Medication 75 MICROGRAM(S): at 05:54

## 2024-02-19 RX ADMIN — HALOPERIDOL DECANOATE 5 MILLIGRAM(S): 100 INJECTION INTRAMUSCULAR at 09:33

## 2024-02-19 RX ADMIN — Medication 0.5 MILLIGRAM(S): at 09:33

## 2024-02-19 RX ADMIN — CARBIDOPA AND LEVODOPA 1 TABLET(S): 25; 100 TABLET ORAL at 17:37

## 2024-02-19 RX ADMIN — VALACYCLOVIR 1000 MILLIGRAM(S): 500 TABLET, FILM COATED ORAL at 20:57

## 2024-02-19 RX ADMIN — DIVALPROEX SODIUM 500 MILLIGRAM(S): 500 TABLET, DELAYED RELEASE ORAL at 12:58

## 2024-02-20 LAB
BASOPHILS # BLD AUTO: 0.05 K/UL — SIGNIFICANT CHANGE UP (ref 0–0.2)
BASOPHILS NFR BLD AUTO: 0.8 % — SIGNIFICANT CHANGE UP (ref 0–2)
EOSINOPHIL # BLD AUTO: 0.14 K/UL — SIGNIFICANT CHANGE UP (ref 0–0.5)
EOSINOPHIL NFR BLD AUTO: 2.2 % — SIGNIFICANT CHANGE UP (ref 0–6)
GLUCOSE BLDC GLUCOMTR-MCNC: 108 MG/DL — HIGH (ref 70–99)
GLUCOSE BLDC GLUCOMTR-MCNC: 155 MG/DL — HIGH (ref 70–99)
HCT VFR BLD CALC: 35 % — SIGNIFICANT CHANGE UP (ref 34.5–45)
HGB BLD-MCNC: 10.9 G/DL — LOW (ref 11.5–15.5)
IANC: 3.89 K/UL — SIGNIFICANT CHANGE UP (ref 1.8–7.4)
IMM GRANULOCYTES NFR BLD AUTO: 0.9 % — SIGNIFICANT CHANGE UP (ref 0–0.9)
LYMPHOCYTES # BLD AUTO: 1.33 K/UL — SIGNIFICANT CHANGE UP (ref 1–3.3)
LYMPHOCYTES # BLD AUTO: 20.8 % — SIGNIFICANT CHANGE UP (ref 13–44)
MCHC RBC-ENTMCNC: 27.5 PG — SIGNIFICANT CHANGE UP (ref 27–34)
MCHC RBC-ENTMCNC: 31.1 GM/DL — LOW (ref 32–36)
MCV RBC AUTO: 88.4 FL — SIGNIFICANT CHANGE UP (ref 80–100)
MONOCYTES # BLD AUTO: 0.91 K/UL — HIGH (ref 0–0.9)
MONOCYTES NFR BLD AUTO: 14.3 % — HIGH (ref 2–14)
NEUTROPHILS # BLD AUTO: 3.89 K/UL — SIGNIFICANT CHANGE UP (ref 1.8–7.4)
NEUTROPHILS NFR BLD AUTO: 61 % — SIGNIFICANT CHANGE UP (ref 43–77)
NRBC # BLD: 0 /100 WBCS — SIGNIFICANT CHANGE UP (ref 0–0)
NRBC # FLD: 0 K/UL — SIGNIFICANT CHANGE UP (ref 0–0)
PLATELET # BLD AUTO: 184 K/UL — SIGNIFICANT CHANGE UP (ref 150–400)
RBC # BLD: 3.96 M/UL — SIGNIFICANT CHANGE UP (ref 3.8–5.2)
RBC # FLD: 17.2 % — HIGH (ref 10.3–14.5)
WBC # BLD: 6.38 K/UL — SIGNIFICANT CHANGE UP (ref 3.8–10.5)
WBC # FLD AUTO: 6.38 K/UL — SIGNIFICANT CHANGE UP (ref 3.8–10.5)

## 2024-02-20 PROCEDURE — 99231 SBSQ HOSP IP/OBS SF/LOW 25: CPT

## 2024-02-20 RX ORDER — CLONAZEPAM 1 MG
0.5 TABLET ORAL
Refills: 0 | Status: DISCONTINUED | OUTPATIENT
Start: 2024-02-20 | End: 2024-02-23

## 2024-02-20 RX ADMIN — CARBIDOPA AND LEVODOPA 1 TABLET(S): 25; 100 TABLET ORAL at 17:00

## 2024-02-20 RX ADMIN — Medication 0.5 MILLIGRAM(S): at 08:56

## 2024-02-20 RX ADMIN — METFORMIN HYDROCHLORIDE 500 MILLIGRAM(S): 850 TABLET ORAL at 21:30

## 2024-02-20 RX ADMIN — DIVALPROEX SODIUM 500 MILLIGRAM(S): 500 TABLET, DELAYED RELEASE ORAL at 08:54

## 2024-02-20 RX ADMIN — Medication 0.5 MILLIGRAM(S): at 17:00

## 2024-02-20 RX ADMIN — Medication 75 MICROGRAM(S): at 05:57

## 2024-02-20 RX ADMIN — DIVALPROEX SODIUM 500 MILLIGRAM(S): 500 TABLET, DELAYED RELEASE ORAL at 17:01

## 2024-02-20 RX ADMIN — SENNA PLUS 1 TABLET(S): 8.6 TABLET ORAL at 21:30

## 2024-02-20 RX ADMIN — QUETIAPINE FUMARATE 50 MILLIGRAM(S): 200 TABLET, FILM COATED ORAL at 13:13

## 2024-02-20 RX ADMIN — HALOPERIDOL DECANOATE 5 MILLIGRAM(S): 100 INJECTION INTRAMUSCULAR at 08:55

## 2024-02-20 RX ADMIN — VALACYCLOVIR 1000 MILLIGRAM(S): 500 TABLET, FILM COATED ORAL at 08:59

## 2024-02-20 RX ADMIN — VALACYCLOVIR 1000 MILLIGRAM(S): 500 TABLET, FILM COATED ORAL at 22:00

## 2024-02-20 RX ADMIN — QUETIAPINE FUMARATE 50 MILLIGRAM(S): 200 TABLET, FILM COATED ORAL at 08:54

## 2024-02-20 RX ADMIN — DIVALPROEX SODIUM 500 MILLIGRAM(S): 500 TABLET, DELAYED RELEASE ORAL at 13:14

## 2024-02-20 RX ADMIN — LISINOPRIL 20 MILLIGRAM(S): 2.5 TABLET ORAL at 08:54

## 2024-02-20 RX ADMIN — CARBIDOPA AND LEVODOPA 1 TABLET(S): 25; 100 TABLET ORAL at 13:13

## 2024-02-20 RX ADMIN — Medication 1 TABLET(S): at 08:56

## 2024-02-20 RX ADMIN — QUETIAPINE FUMARATE 150 MILLIGRAM(S): 200 TABLET, FILM COATED ORAL at 21:30

## 2024-02-20 RX ADMIN — METFORMIN HYDROCHLORIDE 500 MILLIGRAM(S): 850 TABLET ORAL at 08:56

## 2024-02-20 RX ADMIN — HALOPERIDOL DECANOATE 5 MILLIGRAM(S): 100 INJECTION INTRAMUSCULAR at 17:00

## 2024-02-20 RX ADMIN — CARBIDOPA AND LEVODOPA 1 TABLET(S): 25; 100 TABLET ORAL at 08:59

## 2024-02-20 RX ADMIN — Medication 50 MILLIGRAM(S): at 08:59

## 2024-02-20 NOTE — BH INPATIENT PSYCHIATRY PROGRESS NOTE - NSBHASSESSSUMMFT_PSY_ALL_CORE
2/5 Patient showing some improving trend will give more time, if needs more medication titration due to ongoing symptoms will increase Seroquel not haldol  2/6 Improving cont meds, offered prn suppository and simethicone  2/7 Slowly improving will cont current meds, eval need for further increase Seroquel  2/8 Partial response continue meds except will increase Seroquel as was on 250mg/d PTA  2/9 Overall gains, still regresses and becomes difficult to manage will increase Seroquel to 200mg/d in divided dosing  2/12 A little calmer since started on additional mid day, cont other meds w/o change  2/13 Patient better still disorganized, hyper Shinto but less agitated, cont current treatment  2/14 Improved globally with some symptom variability. Cont current med consider further increment Seroquel  2/15 Improved cont current regimen for now  2/16 Showing  some gains continue treatment for now at current doses   2/20 Improved, cont current rx. Scheduled urogyn for pessary replacement

## 2024-02-20 NOTE — BH INPATIENT PSYCHIATRY PROGRESS NOTE - NSBHMETABOLIC_PSY_ALL_CORE_FT
BMI: BMI (kg/m2): 30.2 (01-15-24 @ 12:52)  HbA1c: A1C with Estimated Average Glucose Result: 7.6 % (01-12-24 @ 12:40)    Glucose: POCT Blood Glucose.: 108 mg/dL (02-20-24 @ 08:11)    BP: --Vital Signs Last 24 Hrs  T(C): 36.8 (02-20-24 @ 08:12), Max: 36.8 (02-20-24 @ 08:12)  T(F): 98.3 (02-20-24 @ 08:12), Max: 98.3 (02-20-24 @ 08:12)  HR: --  BP: --  BP(mean): --  RR: --  SpO2: --    Orthostatic VS  02-20-24 @ 08:12  Lying BP: --/-- HR: --  Sitting BP: 133/74 HR: 85  Standing BP: 140/80 HR: 89  Site: --  Mode: --    Lipid Panel:

## 2024-02-20 NOTE — BH INPATIENT PSYCHIATRY PROGRESS NOTE - NSBHCHARTREVIEWVS_PSY_A_CORE FT
Vital Signs Last 24 Hrs  T(C): 36.8 (02-20-24 @ 08:12), Max: 36.8 (02-20-24 @ 08:12)  T(F): 98.3 (02-20-24 @ 08:12), Max: 98.3 (02-20-24 @ 08:12)  HR: --  BP: --  BP(mean): --  RR: --  SpO2: --    Orthostatic VS  02-20-24 @ 08:12  Lying BP: --/-- HR: --  Sitting BP: 133/74 HR: 85  Standing BP: 140/80 HR: 89  Site: --  Mode: --

## 2024-02-20 NOTE — BH INPATIENT PSYCHIATRY PROGRESS NOTE - NSBHFUPINTERVALHXFT_PSY_A_CORE
Patient seen for schizoaffective. No prn overnight Some trend towards  being calmer, still with intrusiveness, impaired boundaries Eating, okay sleep fair. Compliant with meds.VSS. CBC stable

## 2024-02-21 LAB
GLUCOSE BLDC GLUCOMTR-MCNC: 129 MG/DL — HIGH (ref 70–99)
GLUCOSE BLDC GLUCOMTR-MCNC: 156 MG/DL — HIGH (ref 70–99)

## 2024-02-21 PROCEDURE — 99232 SBSQ HOSP IP/OBS MODERATE 35: CPT

## 2024-02-21 RX ORDER — POLYETHYLENE GLYCOL 3350 17 G/17G
17 POWDER, FOR SOLUTION ORAL DAILY
Refills: 0 | Status: DISCONTINUED | OUTPATIENT
Start: 2024-02-21 | End: 2024-05-17

## 2024-02-21 RX ORDER — SENNA PLUS 8.6 MG/1
2 TABLET ORAL AT BEDTIME
Refills: 0 | Status: DISCONTINUED | OUTPATIENT
Start: 2024-02-21 | End: 2024-05-17

## 2024-02-21 RX ADMIN — METFORMIN HYDROCHLORIDE 500 MILLIGRAM(S): 850 TABLET ORAL at 10:15

## 2024-02-21 RX ADMIN — LISINOPRIL 20 MILLIGRAM(S): 2.5 TABLET ORAL at 10:15

## 2024-02-21 RX ADMIN — VALACYCLOVIR 1000 MILLIGRAM(S): 500 TABLET, FILM COATED ORAL at 20:46

## 2024-02-21 RX ADMIN — HALOPERIDOL DECANOATE 5 MILLIGRAM(S): 100 INJECTION INTRAMUSCULAR at 16:51

## 2024-02-21 RX ADMIN — CARBIDOPA AND LEVODOPA 1 TABLET(S): 25; 100 TABLET ORAL at 10:14

## 2024-02-21 RX ADMIN — QUETIAPINE FUMARATE 150 MILLIGRAM(S): 200 TABLET, FILM COATED ORAL at 20:46

## 2024-02-21 RX ADMIN — DIVALPROEX SODIUM 500 MILLIGRAM(S): 500 TABLET, DELAYED RELEASE ORAL at 10:14

## 2024-02-21 RX ADMIN — Medication 1 TABLET(S): at 10:15

## 2024-02-21 RX ADMIN — QUETIAPINE FUMARATE 50 MILLIGRAM(S): 200 TABLET, FILM COATED ORAL at 10:15

## 2024-02-21 RX ADMIN — Medication 0.5 MILLIGRAM(S): at 10:14

## 2024-02-21 RX ADMIN — QUETIAPINE FUMARATE 50 MILLIGRAM(S): 200 TABLET, FILM COATED ORAL at 16:51

## 2024-02-21 RX ADMIN — VALACYCLOVIR 1000 MILLIGRAM(S): 500 TABLET, FILM COATED ORAL at 10:15

## 2024-02-21 RX ADMIN — SENNA PLUS 2 TABLET(S): 8.6 TABLET ORAL at 20:46

## 2024-02-21 RX ADMIN — HALOPERIDOL DECANOATE 5 MILLIGRAM(S): 100 INJECTION INTRAMUSCULAR at 10:14

## 2024-02-21 RX ADMIN — CARBIDOPA AND LEVODOPA 1 TABLET(S): 25; 100 TABLET ORAL at 16:52

## 2024-02-21 RX ADMIN — DIVALPROEX SODIUM 500 MILLIGRAM(S): 500 TABLET, DELAYED RELEASE ORAL at 15:11

## 2024-02-21 RX ADMIN — Medication 75 MICROGRAM(S): at 07:02

## 2024-02-21 RX ADMIN — Medication 0.5 MILLIGRAM(S): at 16:51

## 2024-02-21 RX ADMIN — DIVALPROEX SODIUM 500 MILLIGRAM(S): 500 TABLET, DELAYED RELEASE ORAL at 16:51

## 2024-02-21 RX ADMIN — CARBIDOPA AND LEVODOPA 1 TABLET(S): 25; 100 TABLET ORAL at 15:12

## 2024-02-21 RX ADMIN — METFORMIN HYDROCHLORIDE 500 MILLIGRAM(S): 850 TABLET ORAL at 20:46

## 2024-02-21 RX ADMIN — Medication 50 MILLIGRAM(S): at 10:15

## 2024-02-21 NOTE — BH INPATIENT PSYCHIATRY PROGRESS NOTE - NSBHMETABOLIC_PSY_ALL_CORE_FT
BMI: BMI (kg/m2): 30.2 (01-15-24 @ 12:52)  HbA1c: A1C with Estimated Average Glucose Result: 7.6 % (01-12-24 @ 12:40)    Glucose: POCT Blood Glucose.: 129 mg/dL (02-21-24 @ 12:17)    BP: --Vital Signs Last 24 Hrs  T(C): 36.7 (02-21-24 @ 06:16), Max: 36.7 (02-21-24 @ 06:16)  T(F): 98.1 (02-21-24 @ 06:16), Max: 98.1 (02-21-24 @ 06:16)  HR: --  BP: --  BP(mean): --  RR: --  SpO2: --    Orthostatic VS  02-21-24 @ 06:16  Lying BP: --/-- HR: --  Sitting BP: 126/68 HR: 79  Standing BP: 129/74 HR: 84  Site: upper left arm  Mode: electronic  Orthostatic VS  02-20-24 @ 08:12  Lying BP: --/-- HR: --  Sitting BP: 133/74 HR: 85  Standing BP: 140/80 HR: 89  Site: --  Mode: --    Lipid Panel:

## 2024-02-21 NOTE — BH INPATIENT PSYCHIATRY PROGRESS NOTE - NSBHASSESSSUMMFT_PSY_ALL_CORE
2/5 Patient showing some improving trend will give more time, if needs more medication titration due to ongoing symptoms will increase Seroquel not haldol  2/6 Improving cont meds, offered prn suppository and simethicone  2/7 Slowly improving will cont current meds, eval need for further increase Seroquel  2/8 Partial response continue meds except will increase Seroquel as was on 250mg/d PTA  2/9 Overall gains, still regresses and becomes difficult to manage will increase Seroquel to 200mg/d in divided dosing  2/12 A little calmer since started on additional mid day, cont other meds w/o change  2/13 Patient better still disorganized, hyper [place] but less agitated, cont current treatment  2/14 Improved globally with some symptom variability. Cont current med consider further increment Seroquel  2/15 Improved cont current regimen for now  2/16 Showing  some gains continue treatment for now at current doses   2/20 Improved, cont current rx. Scheduled urogyn for pessary replacement  2/21 Improved with variable intrusiveness. Cont meds will increase Miralax and Senna

## 2024-02-21 NOTE — BH INPATIENT PSYCHIATRY PROGRESS NOTE - CURRENT MEDICATION
MEDICATIONS  (STANDING):  ammonium lactate 12% Lotion 1 Application(s) Topical two times a day  carbidopa/levodopa  25/100 1 Tablet(s) Oral <User Schedule>  clonazePAM Oral Disintegrating Tablet 0.5 milliGRAM(s) Oral <User Schedule>  dextrose 5%. 1000 milliLiter(s) (100 mL/Hr) IV Continuous <Continuous>  dextrose 5%. 1000 milliLiter(s) (50 mL/Hr) IV Continuous <Continuous>  dextrose 50% Injectable 25 Gram(s) IV Push once  dextrose 50% Injectable 25 Gram(s) IV Push once  dextrose 50% Injectable 12.5 Gram(s) IV Push once  divalproex Sprinkle 500 milliGRAM(s) Oral <User Schedule>  glucagon  Injectable 1 milliGRAM(s) IntraMuscular once  haloperidol     Tablet 5 milliGRAM(s) Oral <User Schedule>  hemorrhoidal Ointment 1 Application(s) Rectal daily  hydrocortisone 2.5% Ointment 1 Application(s) Topical two times a day  insulin lispro (ADMELOG) corrective regimen sliding scale   SubCutaneous three times a day before meals  levothyroxine 75 MICROGram(s) Oral daily  lisinopril 20 milliGRAM(s) Oral daily  metFORMIN 500 milliGRAM(s) Oral two times a day  metoprolol succinate ER 50 milliGRAM(s) Oral daily  multivitamin 1 Tablet(s) Oral <User Schedule>  polyethylene glycol 3350 17 Gram(s) Oral daily  QUEtiapine 150 milliGRAM(s) Oral at bedtime  QUEtiapine 50 milliGRAM(s) Oral <User Schedule>  senna 2 Tablet(s) Oral at bedtime  valACYclovir 1000 milliGRAM(s) Oral every 12 hours    MEDICATIONS  (PRN):  acetaminophen     Tablet .. 650 milliGRAM(s) Oral every 6 hours PRN Mild Pain (1 - 3)  albuterol    90 MICROgram(s) HFA Inhaler 2 Puff(s) Inhalation every 6 hours PRN Shortness of Breath and/or Wheezing  aluminum hydroxide/magnesium hydroxide/simethicone Suspension 30 milliLiter(s) Oral every 6 hours PRN Dyspepsia  bisacodyl Suppository 10 milliGRAM(s) Rectal daily PRN constipation  dextrose Oral Gel 15 Gram(s) Oral once PRN Blood Glucose LESS THAN 70 milliGRAM(s)/deciliter  haloperidol     Tablet 5 milliGRAM(s) Oral every 6 hours PRN agitation  haloperidol    Injectable 5 milliGRAM(s) IntraMuscular once PRN severe agitation  haloperidol    Injectable 5 milliGRAM(s) IntraMuscular once PRN Severe Agitation  LORazepam     Tablet 1 milliGRAM(s) Oral every 6 hours PRN agitation  LORazepam   Injectable 1 milliGRAM(s) IntraMuscular once PRN Severe agitation  simethicone 80 milliGRAM(s) Chew every 8 hours PRN gas

## 2024-02-21 NOTE — BH INPATIENT PSYCHIATRY PROGRESS NOTE - NSBHCHARTREVIEWVS_PSY_A_CORE FT
Vital Signs Last 24 Hrs  T(C): 36.7 (02-21-24 @ 06:16), Max: 36.7 (02-21-24 @ 06:16)  T(F): 98.1 (02-21-24 @ 06:16), Max: 98.1 (02-21-24 @ 06:16)  HR: --  BP: --  BP(mean): --  RR: --  SpO2: --    Orthostatic VS  02-21-24 @ 06:16  Lying BP: --/-- HR: --  Sitting BP: 126/68 HR: 79  Standing BP: 129/74 HR: 84  Site: upper left arm  Mode: electronic  Orthostatic VS  02-20-24 @ 08:12  Lying BP: --/-- HR: --  Sitting BP: 133/74 HR: 85  Standing BP: 140/80 HR: 89  Site: --  Mode: --

## 2024-02-21 NOTE — BH INPATIENT PSYCHIATRY PROGRESS NOTE - MSE UNSTRUCTURED FT
Pt appears stated age, awake,  alert, poor relatedness  though sl more able to engage her. Stable gait noted.  Speech hard to understand due to poor articulation and edentulous at times sings her conversion. Has tremor R more than L but no evidence of rigidity, walks well, no shuffling or festination.  Mood irritable, affect labile though less severe No suicidal/homicidal ideas/plan expressed. Religiously and somatically focused, some suspiciousness w/o well formed delusions Thinking disjointed. Cannot assess cognition. Poor insight and judgement, is impulsive but more directable

## 2024-02-22 LAB — GLUCOSE BLDC GLUCOMTR-MCNC: 155 MG/DL — HIGH (ref 70–99)

## 2024-02-22 PROCEDURE — 99232 SBSQ HOSP IP/OBS MODERATE 35: CPT

## 2024-02-22 RX ORDER — CARBIDOPA AND LEVODOPA 25; 100 MG/1; MG/1
1 TABLET ORAL THREE TIMES A DAY
Refills: 0 | Status: DISCONTINUED | OUTPATIENT
Start: 2024-02-22 | End: 2024-03-27

## 2024-02-22 RX ADMIN — QUETIAPINE FUMARATE 50 MILLIGRAM(S): 200 TABLET, FILM COATED ORAL at 09:54

## 2024-02-22 RX ADMIN — Medication 50 MILLIGRAM(S): at 09:53

## 2024-02-22 RX ADMIN — LISINOPRIL 20 MILLIGRAM(S): 2.5 TABLET ORAL at 09:54

## 2024-02-22 RX ADMIN — SENNA PLUS 2 TABLET(S): 8.6 TABLET ORAL at 20:55

## 2024-02-22 RX ADMIN — CARBIDOPA AND LEVODOPA 1 TABLET(S): 25; 100 TABLET ORAL at 09:54

## 2024-02-22 RX ADMIN — CARBIDOPA AND LEVODOPA 1 TABLET(S): 25; 100 TABLET ORAL at 13:21

## 2024-02-22 RX ADMIN — METFORMIN HYDROCHLORIDE 500 MILLIGRAM(S): 850 TABLET ORAL at 20:54

## 2024-02-22 RX ADMIN — CARBIDOPA AND LEVODOPA 1 TABLET(S): 25; 100 TABLET ORAL at 20:54

## 2024-02-22 RX ADMIN — Medication 1 APPLICATION(S): at 09:58

## 2024-02-22 RX ADMIN — Medication 1 APPLICATION(S): at 21:22

## 2024-02-22 RX ADMIN — Medication 1 TABLET(S): at 09:53

## 2024-02-22 RX ADMIN — Medication 0.5 MILLIGRAM(S): at 09:53

## 2024-02-22 RX ADMIN — VALACYCLOVIR 1000 MILLIGRAM(S): 500 TABLET, FILM COATED ORAL at 09:53

## 2024-02-22 RX ADMIN — VALACYCLOVIR 1000 MILLIGRAM(S): 500 TABLET, FILM COATED ORAL at 20:55

## 2024-02-22 RX ADMIN — METFORMIN HYDROCHLORIDE 500 MILLIGRAM(S): 850 TABLET ORAL at 09:53

## 2024-02-22 RX ADMIN — Medication 75 MICROGRAM(S): at 06:57

## 2024-02-22 RX ADMIN — POLYETHYLENE GLYCOL 3350 17 GRAM(S): 17 POWDER, FOR SOLUTION ORAL at 10:22

## 2024-02-22 RX ADMIN — DIVALPROEX SODIUM 500 MILLIGRAM(S): 500 TABLET, DELAYED RELEASE ORAL at 09:58

## 2024-02-22 RX ADMIN — DIVALPROEX SODIUM 500 MILLIGRAM(S): 500 TABLET, DELAYED RELEASE ORAL at 13:20

## 2024-02-22 RX ADMIN — Medication 0.5 MILLIGRAM(S): at 17:28

## 2024-02-22 RX ADMIN — DIVALPROEX SODIUM 500 MILLIGRAM(S): 500 TABLET, DELAYED RELEASE ORAL at 17:27

## 2024-02-22 RX ADMIN — QUETIAPINE FUMARATE 50 MILLIGRAM(S): 200 TABLET, FILM COATED ORAL at 13:20

## 2024-02-22 RX ADMIN — QUETIAPINE FUMARATE 150 MILLIGRAM(S): 200 TABLET, FILM COATED ORAL at 20:55

## 2024-02-22 NOTE — BH INPATIENT PSYCHIATRY PROGRESS NOTE - NSBHASSESSSUMMFT_PSY_ALL_CORE
2/5 Patient showing some improving trend will give more time, if needs more medication titration due to ongoing symptoms will increase Seroquel not haldol  2/6 Improving cont meds, offered prn suppository and simethicone  2/7 Slowly improving will cont current meds, eval need for further increase Seroquel  2/8 Partial response continue meds except will increase Seroquel as was on 250mg/d PTA  2/9 Overall gains, still regresses and becomes difficult to manage will increase Seroquel to 200mg/d in divided dosing  2/12 A little calmer since started on additional mid day, cont other meds w/o change  2/13 Patient better still disorganized, hyper Restorationism but less agitated, cont current treatment  2/14 Improved globally with some symptom variability. Cont current med consider further increment Seroquel  2/15 Improved cont current regimen for now  2/16 Showing  some gains continue treatment for now at current doses   2/20 Improved, cont current rx. Scheduled urogyn for pessary replacement  2/21 Improved with variable intrusiveness. Cont meds will increase Miralax and Senna  2/22 Improving cont current rx, observe for effect of re-titration of laxatives

## 2024-02-22 NOTE — BH INPATIENT PSYCHIATRY PROGRESS NOTE - CURRENT MEDICATION
MEDICATIONS  (STANDING):  ammonium lactate 12% Lotion 1 Application(s) Topical two times a day  carbidopa/levodopa  25/100 1 Tablet(s) Oral three times a day  clonazePAM Oral Disintegrating Tablet 0.5 milliGRAM(s) Oral <User Schedule>  dextrose 5%. 1000 milliLiter(s) (100 mL/Hr) IV Continuous <Continuous>  dextrose 5%. 1000 milliLiter(s) (50 mL/Hr) IV Continuous <Continuous>  dextrose 50% Injectable 25 Gram(s) IV Push once  dextrose 50% Injectable 25 Gram(s) IV Push once  dextrose 50% Injectable 12.5 Gram(s) IV Push once  divalproex Sprinkle 500 milliGRAM(s) Oral <User Schedule>  glucagon  Injectable 1 milliGRAM(s) IntraMuscular once  haloperidol     Tablet 5 milliGRAM(s) Oral <User Schedule>  hemorrhoidal Ointment 1 Application(s) Rectal daily  hydrocortisone 2.5% Ointment 1 Application(s) Topical two times a day  insulin lispro (ADMELOG) corrective regimen sliding scale   SubCutaneous three times a day before meals  levothyroxine 75 MICROGram(s) Oral daily  lisinopril 20 milliGRAM(s) Oral daily  metFORMIN 500 milliGRAM(s) Oral two times a day  metoprolol succinate ER 50 milliGRAM(s) Oral daily  multivitamin 1 Tablet(s) Oral <User Schedule>  polyethylene glycol 3350 17 Gram(s) Oral daily  QUEtiapine 150 milliGRAM(s) Oral at bedtime  QUEtiapine 50 milliGRAM(s) Oral <User Schedule>  senna 2 Tablet(s) Oral at bedtime  valACYclovir 1000 milliGRAM(s) Oral every 12 hours    MEDICATIONS  (PRN):  acetaminophen     Tablet .. 650 milliGRAM(s) Oral every 6 hours PRN Mild Pain (1 - 3)  albuterol    90 MICROgram(s) HFA Inhaler 2 Puff(s) Inhalation every 6 hours PRN Shortness of Breath and/or Wheezing  aluminum hydroxide/magnesium hydroxide/simethicone Suspension 30 milliLiter(s) Oral every 6 hours PRN Dyspepsia  bisacodyl Suppository 10 milliGRAM(s) Rectal daily PRN constipation  dextrose Oral Gel 15 Gram(s) Oral once PRN Blood Glucose LESS THAN 70 milliGRAM(s)/deciliter  haloperidol     Tablet 5 milliGRAM(s) Oral every 6 hours PRN agitation  haloperidol    Injectable 5 milliGRAM(s) IntraMuscular once PRN severe agitation  haloperidol    Injectable 5 milliGRAM(s) IntraMuscular once PRN Severe Agitation  LORazepam     Tablet 1 milliGRAM(s) Oral every 6 hours PRN agitation  LORazepam   Injectable 1 milliGRAM(s) IntraMuscular once PRN Severe agitation  simethicone 80 milliGRAM(s) Chew every 8 hours PRN gas

## 2024-02-22 NOTE — BH INPATIENT PSYCHIATRY PROGRESS NOTE - NSBHFUPINTERVALHXFT_PSY_A_CORE
Patient seen for schizoaffective. No prn overnight Some trend towards  being calmer, still with intrusiveness, impaired boundaries but less Eating, okay sleep fair. Compliant with meds.VSS.

## 2024-02-22 NOTE — BH INPATIENT PSYCHIATRY PROGRESS NOTE - NSBHCHARTREVIEWVS_PSY_A_CORE FT
Vital Signs Last 24 Hrs  T(C): 36.4 (02-22-24 @ 09:38), Max: 36.4 (02-22-24 @ 09:38)  T(F): 97.5 (02-22-24 @ 09:38), Max: 97.5 (02-22-24 @ 09:38)  HR: --  BP: --  BP(mean): --  RR: --  SpO2: --    Orthostatic VS  02-22-24 @ 09:38  Lying BP: --/-- HR: --  Sitting BP: 133/71 HR: 96  Standing BP: 146/69 HR: 86  Site: --  Mode: --  Orthostatic VS  02-21-24 @ 06:16  Lying BP: --/-- HR: --  Sitting BP: 126/68 HR: 79  Standing BP: 129/74 HR: 84  Site: upper left arm  Mode: electronic

## 2024-02-22 NOTE — BH INPATIENT PSYCHIATRY PROGRESS NOTE - NSBHMETABOLIC_PSY_ALL_CORE_FT
BMI: BMI (kg/m2): 30.2 (01-15-24 @ 12:52)  HbA1c: A1C with Estimated Average Glucose Result: 7.6 % (01-12-24 @ 12:40)    Glucose: POCT Blood Glucose.: 156 mg/dL (02-21-24 @ 20:31)    BP: --Vital Signs Last 24 Hrs  T(C): 36.4 (02-22-24 @ 09:38), Max: 36.4 (02-22-24 @ 09:38)  T(F): 97.5 (02-22-24 @ 09:38), Max: 97.5 (02-22-24 @ 09:38)  HR: --  BP: --  BP(mean): --  RR: --  SpO2: --    Orthostatic VS  02-22-24 @ 09:38  Lying BP: --/-- HR: --  Sitting BP: 133/71 HR: 96  Standing BP: 146/69 HR: 86  Site: --  Mode: --  Orthostatic VS  02-21-24 @ 06:16  Lying BP: --/-- HR: --  Sitting BP: 126/68 HR: 79  Standing BP: 129/74 HR: 84  Site: upper left arm  Mode: electronic    Lipid Panel:

## 2024-02-23 LAB
GLUCOSE BLDC GLUCOMTR-MCNC: 125 MG/DL — HIGH (ref 70–99)
GLUCOSE BLDC GLUCOMTR-MCNC: 128 MG/DL — HIGH (ref 70–99)
GLUCOSE BLDC GLUCOMTR-MCNC: 144 MG/DL — HIGH (ref 70–99)

## 2024-02-23 PROCEDURE — 99232 SBSQ HOSP IP/OBS MODERATE 35: CPT

## 2024-02-23 RX ORDER — HALOPERIDOL DECANOATE 100 MG/ML
2.5 INJECTION INTRAMUSCULAR EVERY 6 HOURS
Refills: 0 | Status: DISCONTINUED | OUTPATIENT
Start: 2024-02-23 | End: 2024-02-29

## 2024-02-23 RX ORDER — HALOPERIDOL DECANOATE 100 MG/ML
2.5 INJECTION INTRAMUSCULAR ONCE
Refills: 0 | Status: DISCONTINUED | OUTPATIENT
Start: 2024-02-23 | End: 2024-03-18

## 2024-02-23 RX ORDER — HALOPERIDOL DECANOATE 100 MG/ML
2.5 INJECTION INTRAMUSCULAR THREE TIMES A DAY
Refills: 0 | Status: DISCONTINUED | OUTPATIENT
Start: 2024-02-24 | End: 2024-02-27

## 2024-02-23 RX ORDER — CLONAZEPAM 1 MG
0.25 TABLET ORAL THREE TIMES A DAY
Refills: 0 | Status: DISCONTINUED | OUTPATIENT
Start: 2024-02-23 | End: 2024-02-29

## 2024-02-23 RX ADMIN — CARBIDOPA AND LEVODOPA 1 TABLET(S): 25; 100 TABLET ORAL at 10:49

## 2024-02-23 RX ADMIN — CARBIDOPA AND LEVODOPA 1 TABLET(S): 25; 100 TABLET ORAL at 14:18

## 2024-02-23 RX ADMIN — METFORMIN HYDROCHLORIDE 500 MILLIGRAM(S): 850 TABLET ORAL at 10:48

## 2024-02-23 RX ADMIN — HALOPERIDOL DECANOATE 5 MILLIGRAM(S): 100 INJECTION INTRAMUSCULAR at 10:57

## 2024-02-23 RX ADMIN — METFORMIN HYDROCHLORIDE 500 MILLIGRAM(S): 850 TABLET ORAL at 21:35

## 2024-02-23 RX ADMIN — QUETIAPINE FUMARATE 150 MILLIGRAM(S): 200 TABLET, FILM COATED ORAL at 21:35

## 2024-02-23 RX ADMIN — Medication 1 TABLET(S): at 10:52

## 2024-02-23 RX ADMIN — SENNA PLUS 2 TABLET(S): 8.6 TABLET ORAL at 21:35

## 2024-02-23 RX ADMIN — DIVALPROEX SODIUM 500 MILLIGRAM(S): 500 TABLET, DELAYED RELEASE ORAL at 14:17

## 2024-02-23 RX ADMIN — QUETIAPINE FUMARATE 50 MILLIGRAM(S): 200 TABLET, FILM COATED ORAL at 10:48

## 2024-02-23 RX ADMIN — Medication 75 MICROGRAM(S): at 06:16

## 2024-02-23 RX ADMIN — DIVALPROEX SODIUM 500 MILLIGRAM(S): 500 TABLET, DELAYED RELEASE ORAL at 10:52

## 2024-02-23 RX ADMIN — LISINOPRIL 20 MILLIGRAM(S): 2.5 TABLET ORAL at 10:49

## 2024-02-23 RX ADMIN — DIVALPROEX SODIUM 500 MILLIGRAM(S): 500 TABLET, DELAYED RELEASE ORAL at 17:29

## 2024-02-23 RX ADMIN — Medication 50 MILLIGRAM(S): at 10:48

## 2024-02-23 RX ADMIN — CARBIDOPA AND LEVODOPA 1 TABLET(S): 25; 100 TABLET ORAL at 21:35

## 2024-02-23 RX ADMIN — Medication 0.25 MILLIGRAM(S): at 21:35

## 2024-02-23 RX ADMIN — Medication 0.5 MILLIGRAM(S): at 10:49

## 2024-02-23 RX ADMIN — VALACYCLOVIR 1000 MILLIGRAM(S): 500 TABLET, FILM COATED ORAL at 21:35

## 2024-02-23 RX ADMIN — VALACYCLOVIR 1000 MILLIGRAM(S): 500 TABLET, FILM COATED ORAL at 10:48

## 2024-02-23 NOTE — BH INPATIENT PSYCHIATRY PROGRESS NOTE - NSBHMETABOLIC_PSY_ALL_CORE_FT
BMI: BMI (kg/m2): 30.2 (01-15-24 @ 12:52)  HbA1c: A1C with Estimated Average Glucose Result: 7.6 % (01-12-24 @ 12:40)    Glucose: POCT Blood Glucose.: 125 mg/dL (02-23-24 @ 12:20)    BP: --Vital Signs Last 24 Hrs  T(C): 36.7 (02-23-24 @ 07:50), Max: 36.7 (02-23-24 @ 07:50)  T(F): 98 (02-23-24 @ 07:50), Max: 98 (02-23-24 @ 07:50)  HR: --  BP: --  BP(mean): --  RR: --  SpO2: --    Orthostatic VS  02-23-24 @ 12:20  Lying BP: --/-- HR: --  Sitting BP: 113/60 HR: 93  Standing BP: --/-- HR: --  Site: --  Mode: --  Orthostatic VS  02-23-24 @ 07:50  Lying BP: --/-- HR: --  Sitting BP: 137/83 HR: 80  Standing BP: 154/84 HR: 82  Site: upper left arm  Mode: electronic  Orthostatic VS  02-22-24 @ 09:38  Lying BP: --/-- HR: --  Sitting BP: 133/71 HR: 96  Standing BP: 146/69 HR: 86  Site: --  Mode: --    Lipid Panel:

## 2024-02-23 NOTE — BH INPATIENT PSYCHIATRY PROGRESS NOTE - CURRENT MEDICATION
MEDICATIONS  (STANDING):  ammonium lactate 12% Lotion 1 Application(s) Topical two times a day  carbidopa/levodopa  25/100 1 Tablet(s) Oral three times a day  clonazePAM Oral Disintegrating Tablet 0.25 milliGRAM(s) Oral three times a day  dextrose 5%. 1000 milliLiter(s) (50 mL/Hr) IV Continuous <Continuous>  dextrose 5%. 1000 milliLiter(s) (100 mL/Hr) IV Continuous <Continuous>  dextrose 50% Injectable 25 Gram(s) IV Push once  dextrose 50% Injectable 25 Gram(s) IV Push once  dextrose 50% Injectable 12.5 Gram(s) IV Push once  divalproex Sprinkle 500 milliGRAM(s) Oral <User Schedule>  glucagon  Injectable 1 milliGRAM(s) IntraMuscular once  hemorrhoidal Ointment 1 Application(s) Rectal daily  hydrocortisone 2.5% Ointment 1 Application(s) Topical two times a day  insulin lispro (ADMELOG) corrective regimen sliding scale   SubCutaneous three times a day before meals  levothyroxine 75 MICROGram(s) Oral daily  lisinopril 20 milliGRAM(s) Oral daily  metFORMIN 500 milliGRAM(s) Oral two times a day  metoprolol succinate ER 50 milliGRAM(s) Oral daily  multivitamin 1 Tablet(s) Oral <User Schedule>  polyethylene glycol 3350 17 Gram(s) Oral daily  QUEtiapine 50 milliGRAM(s) Oral <User Schedule>  QUEtiapine 150 milliGRAM(s) Oral at bedtime  senna 2 Tablet(s) Oral at bedtime  valACYclovir 1000 milliGRAM(s) Oral every 12 hours    MEDICATIONS  (PRN):  acetaminophen     Tablet .. 650 milliGRAM(s) Oral every 6 hours PRN Mild Pain (1 - 3)  albuterol    90 MICROgram(s) HFA Inhaler 2 Puff(s) Inhalation every 6 hours PRN Shortness of Breath and/or Wheezing  aluminum hydroxide/magnesium hydroxide/simethicone Suspension 30 milliLiter(s) Oral every 6 hours PRN Dyspepsia  bisacodyl Suppository 10 milliGRAM(s) Rectal daily PRN constipation  dextrose Oral Gel 15 Gram(s) Oral once PRN Blood Glucose LESS THAN 70 milliGRAM(s)/deciliter  haloperidol     Tablet 2.5 milliGRAM(s) Oral every 6 hours PRN agitation  haloperidol    Injectable 5 milliGRAM(s) IntraMuscular once PRN severe agitation  haloperidol    Injectable 2.5 milliGRAM(s) IntraMuscular once PRN Severe Agitation  simethicone 80 milliGRAM(s) Chew every 8 hours PRN gas

## 2024-02-23 NOTE — BH INPATIENT PSYCHIATRY PROGRESS NOTE - PRN MEDS
MEDICATIONS  (PRN):  acetaminophen     Tablet .. 650 milliGRAM(s) Oral every 6 hours PRN Mild Pain (1 - 3)  albuterol    90 MICROgram(s) HFA Inhaler 2 Puff(s) Inhalation every 6 hours PRN Shortness of Breath and/or Wheezing  aluminum hydroxide/magnesium hydroxide/simethicone Suspension 30 milliLiter(s) Oral every 6 hours PRN Dyspepsia  bisacodyl Suppository 10 milliGRAM(s) Rectal daily PRN constipation  dextrose Oral Gel 15 Gram(s) Oral once PRN Blood Glucose LESS THAN 70 milliGRAM(s)/deciliter  haloperidol     Tablet 2.5 milliGRAM(s) Oral every 6 hours PRN agitation  haloperidol    Injectable 5 milliGRAM(s) IntraMuscular once PRN severe agitation  haloperidol    Injectable 2.5 milliGRAM(s) IntraMuscular once PRN Severe Agitation  simethicone 80 milliGRAM(s) Chew every 8 hours PRN gas

## 2024-02-23 NOTE — BH INPATIENT PSYCHIATRY PROGRESS NOTE - NSBHFUPINTERVALHXFT_PSY_A_CORE
Patient seen for schizoaffective. No prn overnight Some trend towards  being calmer, still with intrusiveness, impaired boundaries but less Eating, okay sleep poorly last nightVSS. Today swatted wall with bible, lost balance but supported by staff, avoiding fall Staff reports she is more tired appearing after not sleeping well last night

## 2024-02-23 NOTE — BH INPATIENT PSYCHIATRY PROGRESS NOTE - NSBHASSESSSUMMFT_PSY_ALL_CORE
2/5 Patient showing some improving trend will give more time, if needs more medication titration due to ongoing symptoms will increase Seroquel not haldol  2/6 Improving cont meds, offered prn suppository and simethicone  2/7 Slowly improving will cont current meds, eval need for further increase Seroquel  2/8 Partial response continue meds except will increase Seroquel as was on 250mg/d PTA  2/9 Overall gains, still regresses and becomes difficult to manage will increase Seroquel to 200mg/d in divided dosing  2/12 A little calmer since started on additional mid day, cont other meds w/o change  2/13 Patient better still disorganized, hyper Buddhism but less agitated, cont current treatment  2/14 Improved globally with some symptom variability. Cont current med consider further increment Seroquel  2/15 Improved cont current regimen for now  2/16 Showing  some gains continue treatment for now at current doses   2/20 Improved, cont current rx. Scheduled urogyn for pessary replacement  2/21 Improved with variable intrusiveness. Cont meds will increase Miralax and Senna  2/22 Improving cont current rx, observe for effect of re-titration of laxatives  2/23 Lost balance mainly due to behavior however to be safe will lower haldol and Klonopin modestly and observe. Otherwise generally manageable

## 2024-02-23 NOTE — BH INPATIENT PSYCHIATRY PROGRESS NOTE - NSBHCHARTREVIEWVS_PSY_A_CORE FT
Vital Signs Last 24 Hrs  T(C): 36.7 (02-23-24 @ 07:50), Max: 36.7 (02-23-24 @ 07:50)  T(F): 98 (02-23-24 @ 07:50), Max: 98 (02-23-24 @ 07:50)  HR: --  BP: --  BP(mean): --  RR: --  SpO2: --    Orthostatic VS  02-23-24 @ 12:20  Lying BP: --/-- HR: --  Sitting BP: 113/60 HR: 93  Standing BP: --/-- HR: --  Site: --  Mode: --  Orthostatic VS  02-23-24 @ 07:50  Lying BP: --/-- HR: --  Sitting BP: 137/83 HR: 80  Standing BP: 154/84 HR: 82  Site: upper left arm  Mode: electronic  Orthostatic VS  02-22-24 @ 09:38  Lying BP: --/-- HR: --  Sitting BP: 133/71 HR: 96  Standing BP: 146/69 HR: 86  Site: --  Mode: --

## 2024-02-24 LAB
GLUCOSE BLDC GLUCOMTR-MCNC: 106 MG/DL — HIGH (ref 70–99)
GLUCOSE BLDC GLUCOMTR-MCNC: 131 MG/DL — HIGH (ref 70–99)

## 2024-02-24 PROCEDURE — 99231 SBSQ HOSP IP/OBS SF/LOW 25: CPT

## 2024-02-24 RX ADMIN — METFORMIN HYDROCHLORIDE 500 MILLIGRAM(S): 850 TABLET ORAL at 10:20

## 2024-02-24 RX ADMIN — HALOPERIDOL DECANOATE 2.5 MILLIGRAM(S): 100 INJECTION INTRAMUSCULAR at 13:51

## 2024-02-24 RX ADMIN — Medication 1 TABLET(S): at 10:21

## 2024-02-24 RX ADMIN — QUETIAPINE FUMARATE 50 MILLIGRAM(S): 200 TABLET, FILM COATED ORAL at 10:21

## 2024-02-24 RX ADMIN — QUETIAPINE FUMARATE 150 MILLIGRAM(S): 200 TABLET, FILM COATED ORAL at 21:26

## 2024-02-24 RX ADMIN — Medication 0.25 MILLIGRAM(S): at 13:52

## 2024-02-24 RX ADMIN — POLYETHYLENE GLYCOL 3350 17 GRAM(S): 17 POWDER, FOR SOLUTION ORAL at 10:21

## 2024-02-24 RX ADMIN — CARBIDOPA AND LEVODOPA 1 TABLET(S): 25; 100 TABLET ORAL at 10:20

## 2024-02-24 RX ADMIN — DIVALPROEX SODIUM 500 MILLIGRAM(S): 500 TABLET, DELAYED RELEASE ORAL at 13:52

## 2024-02-24 RX ADMIN — CARBIDOPA AND LEVODOPA 1 TABLET(S): 25; 100 TABLET ORAL at 13:51

## 2024-02-24 RX ADMIN — HALOPERIDOL DECANOATE 2.5 MILLIGRAM(S): 100 INJECTION INTRAMUSCULAR at 10:20

## 2024-02-24 RX ADMIN — Medication 0.25 MILLIGRAM(S): at 10:19

## 2024-02-24 RX ADMIN — DIVALPROEX SODIUM 500 MILLIGRAM(S): 500 TABLET, DELAYED RELEASE ORAL at 16:45

## 2024-02-24 RX ADMIN — LISINOPRIL 20 MILLIGRAM(S): 2.5 TABLET ORAL at 10:21

## 2024-02-24 RX ADMIN — QUETIAPINE FUMARATE 50 MILLIGRAM(S): 200 TABLET, FILM COATED ORAL at 13:52

## 2024-02-24 RX ADMIN — VALACYCLOVIR 1000 MILLIGRAM(S): 500 TABLET, FILM COATED ORAL at 21:26

## 2024-02-24 RX ADMIN — Medication 50 MILLIGRAM(S): at 10:21

## 2024-02-24 RX ADMIN — CARBIDOPA AND LEVODOPA 1 TABLET(S): 25; 100 TABLET ORAL at 21:26

## 2024-02-24 RX ADMIN — DIVALPROEX SODIUM 500 MILLIGRAM(S): 500 TABLET, DELAYED RELEASE ORAL at 10:18

## 2024-02-24 RX ADMIN — VALACYCLOVIR 1000 MILLIGRAM(S): 500 TABLET, FILM COATED ORAL at 10:20

## 2024-02-24 RX ADMIN — METFORMIN HYDROCHLORIDE 500 MILLIGRAM(S): 850 TABLET ORAL at 21:25

## 2024-02-24 RX ADMIN — HALOPERIDOL DECANOATE 2.5 MILLIGRAM(S): 100 INJECTION INTRAMUSCULAR at 21:26

## 2024-02-24 RX ADMIN — Medication 75 MICROGRAM(S): at 08:38

## 2024-02-24 RX ADMIN — Medication 0.25 MILLIGRAM(S): at 21:26

## 2024-02-24 RX ADMIN — SENNA PLUS 2 TABLET(S): 8.6 TABLET ORAL at 21:25

## 2024-02-24 NOTE — BH INPATIENT PSYCHIATRY PROGRESS NOTE - NSBHFUPINTERVALHXFT_PSY_A_CORE
Patient seen for schizoaffective. No PRNs needed overnight. Compliant with meds. Vitals are stable, no orthostatic changes. As per staff last night pt was banging her Bible against the wall and lost her balance, staff held her. Pt remains talkative and intrusive at times but overall calmer and redirectable.

## 2024-02-24 NOTE — BH INPATIENT PSYCHIATRY PROGRESS NOTE - CURRENT MEDICATION
MEDICATIONS  (STANDING):  ammonium lactate 12% Lotion 1 Application(s) Topical two times a day  carbidopa/levodopa  25/100 1 Tablet(s) Oral three times a day  clonazePAM Oral Disintegrating Tablet 0.25 milliGRAM(s) Oral three times a day  dextrose 5%. 1000 milliLiter(s) (50 mL/Hr) IV Continuous <Continuous>  dextrose 5%. 1000 milliLiter(s) (100 mL/Hr) IV Continuous <Continuous>  dextrose 50% Injectable 25 Gram(s) IV Push once  dextrose 50% Injectable 12.5 Gram(s) IV Push once  dextrose 50% Injectable 25 Gram(s) IV Push once  divalproex Sprinkle 500 milliGRAM(s) Oral <User Schedule>  glucagon  Injectable 1 milliGRAM(s) IntraMuscular once  haloperidol     Tablet 2.5 milliGRAM(s) Oral three times a day  hemorrhoidal Ointment 1 Application(s) Rectal daily  hydrocortisone 2.5% Ointment 1 Application(s) Topical two times a day  insulin lispro (ADMELOG) corrective regimen sliding scale   SubCutaneous three times a day before meals  levothyroxine 75 MICROGram(s) Oral daily  lisinopril 20 milliGRAM(s) Oral daily  metFORMIN 500 milliGRAM(s) Oral two times a day  metoprolol succinate ER 50 milliGRAM(s) Oral daily  multivitamin 1 Tablet(s) Oral <User Schedule>  polyethylene glycol 3350 17 Gram(s) Oral daily  QUEtiapine 50 milliGRAM(s) Oral <User Schedule>  QUEtiapine 150 milliGRAM(s) Oral at bedtime  senna 2 Tablet(s) Oral at bedtime  valACYclovir 1000 milliGRAM(s) Oral every 12 hours    MEDICATIONS  (PRN):  acetaminophen     Tablet .. 650 milliGRAM(s) Oral every 6 hours PRN Mild Pain (1 - 3)  albuterol    90 MICROgram(s) HFA Inhaler 2 Puff(s) Inhalation every 6 hours PRN Shortness of Breath and/or Wheezing  aluminum hydroxide/magnesium hydroxide/simethicone Suspension 30 milliLiter(s) Oral every 6 hours PRN Dyspepsia  bisacodyl Suppository 10 milliGRAM(s) Rectal daily PRN constipation  dextrose Oral Gel 15 Gram(s) Oral once PRN Blood Glucose LESS THAN 70 milliGRAM(s)/deciliter  haloperidol     Tablet 2.5 milliGRAM(s) Oral every 6 hours PRN agitation  haloperidol    Injectable 2.5 milliGRAM(s) IntraMuscular once PRN Severe Agitation  haloperidol    Injectable 5 milliGRAM(s) IntraMuscular once PRN severe agitation  simethicone 80 milliGRAM(s) Chew every 8 hours PRN gas

## 2024-02-24 NOTE — BH INPATIENT PSYCHIATRY PROGRESS NOTE - NSBHASSESSSUMMFT_PSY_ALL_CORE
2/5 Patient showing some improving trend will give more time, if needs more medication titration due to ongoing symptoms will increase Seroquel not haldol  2/6 Improving cont meds, offered prn suppository and simethicone  2/7 Slowly improving will cont current meds, eval need for further increase Seroquel  2/8 Partial response continue meds except will increase Seroquel as was on 250mg/d PTA  2/9 Overall gains, still regresses and becomes difficult to manage will increase Seroquel to 200mg/d in divided dosing  2/12 A little calmer since started on additional mid day, cont other meds w/o change  2/13 Patient better still disorganized, hyper Congregational but less agitated, cont current treatment  2/14 Improved globally with some symptom variability. Cont current med consider further increment Seroquel  2/15 Improved cont current regimen for now  2/16 Showing  some gains continue treatment for now at current doses   2/20 Improved, cont current rx. Scheduled urogyn for pessary replacement  2/21 Improved with variable intrusiveness. Cont meds will increase Miralax and Senna  2/22 Improving cont current rx, observe for effect of re-titration of laxatives  2/23 Lost balance mainly due to behavior however to be safe will lower haldol and Klonopin modestly and observe. Otherwise generally manageable  2/24: No PRNs needed overnight. Compliant with meds. As per staff last night pt was banging her Bible against the wall and lost her balance, staff held her. Pt remains talkative and intrusive at times but overall calmer and redirectable.

## 2024-02-24 NOTE — BH INPATIENT PSYCHIATRY PROGRESS NOTE - NSBHCHARTREVIEWVS_PSY_A_CORE FT
Vital Signs Last 24 Hrs  T(C): 36.2 (02-24-24 @ 07:57), Max: 36.2 (02-24-24 @ 07:57)  T(F): 97.2 (02-24-24 @ 07:57), Max: 97.2 (02-24-24 @ 07:57)  HR: --  BP: --  BP(mean): --  RR: --  SpO2: --    Orthostatic VS  02-24-24 @ 07:57  Lying BP: --/-- HR: --  Sitting BP: 133/65 HR: 82  Standing BP: 121/60 HR: 85  Site: --  Mode: --  Orthostatic VS  02-23-24 @ 12:20  Lying BP: --/-- HR: --  Sitting BP: 113/60 HR: 93  Standing BP: --/-- HR: --  Site: --  Mode: --  Orthostatic VS  02-23-24 @ 07:50  Lying BP: --/-- HR: --  Sitting BP: 137/83 HR: 80  Standing BP: 154/84 HR: 82  Site: upper left arm  Mode: electronic

## 2024-02-24 NOTE — BH INPATIENT PSYCHIATRY PROGRESS NOTE - PRN MEDS
MEDICATIONS  (PRN):  acetaminophen     Tablet .. 650 milliGRAM(s) Oral every 6 hours PRN Mild Pain (1 - 3)  albuterol    90 MICROgram(s) HFA Inhaler 2 Puff(s) Inhalation every 6 hours PRN Shortness of Breath and/or Wheezing  aluminum hydroxide/magnesium hydroxide/simethicone Suspension 30 milliLiter(s) Oral every 6 hours PRN Dyspepsia  bisacodyl Suppository 10 milliGRAM(s) Rectal daily PRN constipation  dextrose Oral Gel 15 Gram(s) Oral once PRN Blood Glucose LESS THAN 70 milliGRAM(s)/deciliter  haloperidol     Tablet 2.5 milliGRAM(s) Oral every 6 hours PRN agitation  haloperidol    Injectable 2.5 milliGRAM(s) IntraMuscular once PRN Severe Agitation  haloperidol    Injectable 5 milliGRAM(s) IntraMuscular once PRN severe agitation  simethicone 80 milliGRAM(s) Chew every 8 hours PRN gas

## 2024-02-24 NOTE — BH INPATIENT PSYCHIATRY PROGRESS NOTE - NSBHMETABOLIC_PSY_ALL_CORE_FT
BMI: BMI (kg/m2): 30.2 (01-15-24 @ 12:52)  HbA1c: A1C with Estimated Average Glucose Result: 7.6 % (01-12-24 @ 12:40)    Glucose: POCT Blood Glucose.: 106 mg/dL (02-24-24 @ 07:59)    BP: --Vital Signs Last 24 Hrs  T(C): 36.2 (02-24-24 @ 07:57), Max: 36.2 (02-24-24 @ 07:57)  T(F): 97.2 (02-24-24 @ 07:57), Max: 97.2 (02-24-24 @ 07:57)  HR: --  BP: --  BP(mean): --  RR: --  SpO2: --    Orthostatic VS  02-24-24 @ 07:57  Lying BP: --/-- HR: --  Sitting BP: 133/65 HR: 82  Standing BP: 121/60 HR: 85  Site: --  Mode: --  Orthostatic VS  02-23-24 @ 12:20  Lying BP: --/-- HR: --  Sitting BP: 113/60 HR: 93  Standing BP: --/-- HR: --  Site: --  Mode: --  Orthostatic VS  02-23-24 @ 07:50  Lying BP: --/-- HR: --  Sitting BP: 137/83 HR: 80  Standing BP: 154/84 HR: 82  Site: upper left arm  Mode: electronic    Lipid Panel:

## 2024-02-25 LAB
GLUCOSE BLDC GLUCOMTR-MCNC: 101 MG/DL — HIGH (ref 70–99)
GLUCOSE BLDC GLUCOMTR-MCNC: 107 MG/DL — HIGH (ref 70–99)

## 2024-02-25 PROCEDURE — 99231 SBSQ HOSP IP/OBS SF/LOW 25: CPT

## 2024-02-25 RX ADMIN — Medication 1 TABLET(S): at 09:52

## 2024-02-25 RX ADMIN — HALOPERIDOL DECANOATE 2.5 MILLIGRAM(S): 100 INJECTION INTRAMUSCULAR at 09:48

## 2024-02-25 RX ADMIN — Medication 0.25 MILLIGRAM(S): at 09:48

## 2024-02-25 RX ADMIN — Medication 0.25 MILLIGRAM(S): at 21:09

## 2024-02-25 RX ADMIN — Medication 50 MILLIGRAM(S): at 09:48

## 2024-02-25 RX ADMIN — Medication 650 MILLIGRAM(S): at 00:11

## 2024-02-25 RX ADMIN — HALOPERIDOL DECANOATE 2.5 MILLIGRAM(S): 100 INJECTION INTRAMUSCULAR at 21:09

## 2024-02-25 RX ADMIN — SENNA PLUS 2 TABLET(S): 8.6 TABLET ORAL at 21:09

## 2024-02-25 RX ADMIN — METFORMIN HYDROCHLORIDE 500 MILLIGRAM(S): 850 TABLET ORAL at 21:09

## 2024-02-25 RX ADMIN — VALACYCLOVIR 1000 MILLIGRAM(S): 500 TABLET, FILM COATED ORAL at 09:48

## 2024-02-25 RX ADMIN — Medication 650 MILLIGRAM(S): at 05:47

## 2024-02-25 RX ADMIN — CARBIDOPA AND LEVODOPA 1 TABLET(S): 25; 100 TABLET ORAL at 09:48

## 2024-02-25 RX ADMIN — DIVALPROEX SODIUM 500 MILLIGRAM(S): 500 TABLET, DELAYED RELEASE ORAL at 13:24

## 2024-02-25 RX ADMIN — QUETIAPINE FUMARATE 50 MILLIGRAM(S): 200 TABLET, FILM COATED ORAL at 09:48

## 2024-02-25 RX ADMIN — Medication 0.25 MILLIGRAM(S): at 13:24

## 2024-02-25 RX ADMIN — METFORMIN HYDROCHLORIDE 500 MILLIGRAM(S): 850 TABLET ORAL at 09:48

## 2024-02-25 RX ADMIN — DIVALPROEX SODIUM 500 MILLIGRAM(S): 500 TABLET, DELAYED RELEASE ORAL at 09:52

## 2024-02-25 RX ADMIN — QUETIAPINE FUMARATE 50 MILLIGRAM(S): 200 TABLET, FILM COATED ORAL at 13:24

## 2024-02-25 RX ADMIN — CARBIDOPA AND LEVODOPA 1 TABLET(S): 25; 100 TABLET ORAL at 21:09

## 2024-02-25 RX ADMIN — Medication 75 MICROGRAM(S): at 05:54

## 2024-02-25 RX ADMIN — HALOPERIDOL DECANOATE 2.5 MILLIGRAM(S): 100 INJECTION INTRAMUSCULAR at 13:24

## 2024-02-25 RX ADMIN — CARBIDOPA AND LEVODOPA 1 TABLET(S): 25; 100 TABLET ORAL at 13:25

## 2024-02-25 RX ADMIN — QUETIAPINE FUMARATE 150 MILLIGRAM(S): 200 TABLET, FILM COATED ORAL at 21:09

## 2024-02-25 RX ADMIN — VALACYCLOVIR 1000 MILLIGRAM(S): 500 TABLET, FILM COATED ORAL at 21:09

## 2024-02-25 RX ADMIN — LISINOPRIL 20 MILLIGRAM(S): 2.5 TABLET ORAL at 09:51

## 2024-02-25 NOTE — BH INPATIENT PSYCHIATRY PROGRESS NOTE - OTHER
superficially cooperative  adequate  limited  mildly increased tone in bilateral UE, no cogwheeling rigidity, no tremors observed in hands asymmetrical pupils

## 2024-02-25 NOTE — BH INPATIENT PSYCHIATRY PROGRESS NOTE - NSBHFUPINTERVALHXFT_PSY_A_CORE
Patient seen for schizoaffective disorder, chart reviewed and discussed with nursing staff. No PRNs needed overnight. Compliant with meds. Vitals are stable, no orthostatic changes. As per staff, Pt remains easily agitated, loud and labile. On Exam, Pt reports not feeling, had loose bowel movement yesterday, denies any problem this morning. Reports sleep and appetite as very good. She remains talkative and intrusive at times but overall calmer and redirectable.

## 2024-02-25 NOTE — BH INPATIENT PSYCHIATRY PROGRESS NOTE - NSBHASSESSSUMMFT_PSY_ALL_CORE
2/5 Patient showing some improving trend will give more time, if needs more medication titration due to ongoing symptoms will increase Seroquel not haldol  2/6 Improving cont meds, offered prn suppository and simethicone  2/7 Slowly improving will cont current meds, eval need for further increase Seroquel  2/8 Partial response continue meds except will increase Seroquel as was on 250mg/d PTA  2/9 Overall gains, still regresses and becomes difficult to manage will increase Seroquel to 200mg/d in divided dosing  2/12 A little calmer since started on additional mid day, cont other meds w/o change  2/13 Patient better still disorganized, hyper Yazidi but less agitated, cont current treatment  2/14 Improved globally with some symptom variability. Cont current med consider further increment Seroquel  2/15 Improved cont current regimen for now  2/16 Showing  some gains continue treatment for now at current doses   2/20 Improved, cont current rx. Scheduled urogyn for pessary replacement  2/21 Improved with variable intrusiveness. Cont meds will increase Miralax and Senna  2/22 Improving cont current rx, observe for effect of re-titration of laxatives  2/23 Lost balance mainly due to behavior however to be safe will lower haldol and Klonopin modestly and observe. Otherwise generally manageable  2/24: No PRNs needed overnight. Compliant with meds. As per staff last night pt was banging her Bible against the wall and lost her balance, staff held her. Pt remains talkative and intrusive at times but overall calmer and redirectable.   2/25: remains labile, loud, easily agitated but more redirectable.

## 2024-02-25 NOTE — BH INPATIENT PSYCHIATRY PROGRESS NOTE - CURRENT MEDICATION
MEDICATIONS  (STANDING):  ammonium lactate 12% Lotion 1 Application(s) Topical two times a day  carbidopa/levodopa  25/100 1 Tablet(s) Oral three times a day  clonazePAM Oral Disintegrating Tablet 0.25 milliGRAM(s) Oral three times a day  dextrose 5%. 1000 milliLiter(s) (50 mL/Hr) IV Continuous <Continuous>  dextrose 5%. 1000 milliLiter(s) (100 mL/Hr) IV Continuous <Continuous>  dextrose 50% Injectable 25 Gram(s) IV Push once  dextrose 50% Injectable 12.5 Gram(s) IV Push once  dextrose 50% Injectable 25 Gram(s) IV Push once  divalproex Sprinkle 500 milliGRAM(s) Oral <User Schedule>  glucagon  Injectable 1 milliGRAM(s) IntraMuscular once  haloperidol     Tablet 2.5 milliGRAM(s) Oral three times a day  hemorrhoidal Ointment 1 Application(s) Rectal daily  hydrocortisone 2.5% Ointment 1 Application(s) Topical two times a day  insulin lispro (ADMELOG) corrective regimen sliding scale   SubCutaneous three times a day before meals  levothyroxine 75 MICROGram(s) Oral daily  lisinopril 20 milliGRAM(s) Oral daily  metFORMIN 500 milliGRAM(s) Oral two times a day  metoprolol succinate ER 50 milliGRAM(s) Oral daily  multivitamin 1 Tablet(s) Oral <User Schedule>  polyethylene glycol 3350 17 Gram(s) Oral daily  QUEtiapine 50 milliGRAM(s) Oral <User Schedule>  QUEtiapine 150 milliGRAM(s) Oral at bedtime  senna 2 Tablet(s) Oral at bedtime  valACYclovir 1000 milliGRAM(s) Oral every 12 hours    MEDICATIONS  (PRN):  acetaminophen     Tablet .. 650 milliGRAM(s) Oral every 6 hours PRN Mild Pain (1 - 3)  albuterol    90 MICROgram(s) HFA Inhaler 2 Puff(s) Inhalation every 6 hours PRN Shortness of Breath and/or Wheezing  aluminum hydroxide/magnesium hydroxide/simethicone Suspension 30 milliLiter(s) Oral every 6 hours PRN Dyspepsia  bisacodyl Suppository 10 milliGRAM(s) Rectal daily PRN constipation  dextrose Oral Gel 15 Gram(s) Oral once PRN Blood Glucose LESS THAN 70 milliGRAM(s)/deciliter  haloperidol     Tablet 2.5 milliGRAM(s) Oral every 6 hours PRN agitation  haloperidol    Injectable 5 milliGRAM(s) IntraMuscular once PRN severe agitation  haloperidol    Injectable 2.5 milliGRAM(s) IntraMuscular once PRN Severe Agitation  simethicone 80 milliGRAM(s) Chew every 8 hours PRN gas

## 2024-02-25 NOTE — BH INPATIENT PSYCHIATRY PROGRESS NOTE - NSBHCHARTREVIEWVS_PSY_A_CORE FT
Vital Signs Last 24 Hrs  T(C): 36.3 (02-25-24 @ 09:38), Max: 36.3 (02-25-24 @ 09:38)  T(F): 97.4 (02-25-24 @ 09:38), Max: 97.4 (02-25-24 @ 09:38)  HR: --  BP: --  BP(mean): --  RR: --  SpO2: --    Orthostatic VS  02-25-24 @ 09:38  Lying BP: --/-- HR: --  Sitting BP: 129/63 HR: 86  Standing BP: 138/69 HR: 83  Site: upper left arm  Mode: electronic  Orthostatic VS  02-24-24 @ 07:57  Lying BP: --/-- HR: --  Sitting BP: 133/65 HR: 82  Standing BP: 121/60 HR: 85  Site: --  Mode: --

## 2024-02-25 NOTE — BH INPATIENT PSYCHIATRY PROGRESS NOTE - NSBHMETABOLIC_PSY_ALL_CORE_FT
BMI: BMI (kg/m2): 30.2 (01-15-24 @ 12:52)  HbA1c: A1C with Estimated Average Glucose Result: 7.6 % (01-12-24 @ 12:40)    Glucose: POCT Blood Glucose.: 101 mg/dL (02-25-24 @ 08:05)    BP: --Vital Signs Last 24 Hrs  T(C): 36.3 (02-25-24 @ 09:38), Max: 36.3 (02-25-24 @ 09:38)  T(F): 97.4 (02-25-24 @ 09:38), Max: 97.4 (02-25-24 @ 09:38)  HR: --  BP: --  BP(mean): --  RR: --  SpO2: --    Orthostatic VS  02-25-24 @ 09:38  Lying BP: --/-- HR: --  Sitting BP: 129/63 HR: 86  Standing BP: 138/69 HR: 83  Site: upper left arm  Mode: electronic  Orthostatic VS  02-24-24 @ 07:57  Lying BP: --/-- HR: --  Sitting BP: 133/65 HR: 82  Standing BP: 121/60 HR: 85  Site: --  Mode: --    Lipid Panel:

## 2024-02-26 LAB
GLUCOSE BLDC GLUCOMTR-MCNC: 101 MG/DL — HIGH (ref 70–99)
GLUCOSE BLDC GLUCOMTR-MCNC: 107 MG/DL — HIGH (ref 70–99)
GLUCOSE BLDC GLUCOMTR-MCNC: 161 MG/DL — HIGH (ref 70–99)
GLUCOSE BLDC GLUCOMTR-MCNC: 91 MG/DL — SIGNIFICANT CHANGE UP (ref 70–99)

## 2024-02-26 PROCEDURE — 99232 SBSQ HOSP IP/OBS MODERATE 35: CPT

## 2024-02-26 RX ORDER — ACETAMINOPHEN 500 MG
650 TABLET ORAL EVERY 6 HOURS
Refills: 0 | Status: DISCONTINUED | OUTPATIENT
Start: 2024-02-26 | End: 2024-05-17

## 2024-02-26 RX ADMIN — DIVALPROEX SODIUM 500 MILLIGRAM(S): 500 TABLET, DELAYED RELEASE ORAL at 08:40

## 2024-02-26 RX ADMIN — Medication 50 MILLIGRAM(S): at 08:41

## 2024-02-26 RX ADMIN — METFORMIN HYDROCHLORIDE 500 MILLIGRAM(S): 850 TABLET ORAL at 20:38

## 2024-02-26 RX ADMIN — DIVALPROEX SODIUM 500 MILLIGRAM(S): 500 TABLET, DELAYED RELEASE ORAL at 12:37

## 2024-02-26 RX ADMIN — HALOPERIDOL DECANOATE 2.5 MILLIGRAM(S): 100 INJECTION INTRAMUSCULAR at 16:12

## 2024-02-26 RX ADMIN — VALACYCLOVIR 1000 MILLIGRAM(S): 500 TABLET, FILM COATED ORAL at 20:38

## 2024-02-26 RX ADMIN — SENNA PLUS 2 TABLET(S): 8.6 TABLET ORAL at 20:38

## 2024-02-26 RX ADMIN — HALOPERIDOL DECANOATE 2.5 MILLIGRAM(S): 100 INJECTION INTRAMUSCULAR at 08:40

## 2024-02-26 RX ADMIN — LISINOPRIL 20 MILLIGRAM(S): 2.5 TABLET ORAL at 08:41

## 2024-02-26 RX ADMIN — Medication 75 MICROGRAM(S): at 08:40

## 2024-02-26 RX ADMIN — Medication 0.25 MILLIGRAM(S): at 08:41

## 2024-02-26 RX ADMIN — Medication 0.25 MILLIGRAM(S): at 20:37

## 2024-02-26 RX ADMIN — CARBIDOPA AND LEVODOPA 1 TABLET(S): 25; 100 TABLET ORAL at 08:42

## 2024-02-26 RX ADMIN — HALOPERIDOL DECANOATE 2.5 MILLIGRAM(S): 100 INJECTION INTRAMUSCULAR at 12:38

## 2024-02-26 RX ADMIN — DIVALPROEX SODIUM 500 MILLIGRAM(S): 500 TABLET, DELAYED RELEASE ORAL at 16:12

## 2024-02-26 RX ADMIN — CARBIDOPA AND LEVODOPA 1 TABLET(S): 25; 100 TABLET ORAL at 20:38

## 2024-02-26 RX ADMIN — HALOPERIDOL DECANOATE 2.5 MILLIGRAM(S): 100 INJECTION INTRAMUSCULAR at 20:38

## 2024-02-26 RX ADMIN — Medication 1 APPLICATION(S): at 11:33

## 2024-02-26 RX ADMIN — VALACYCLOVIR 1000 MILLIGRAM(S): 500 TABLET, FILM COATED ORAL at 08:41

## 2024-02-26 RX ADMIN — METFORMIN HYDROCHLORIDE 500 MILLIGRAM(S): 850 TABLET ORAL at 08:41

## 2024-02-26 RX ADMIN — Medication 0.25 MILLIGRAM(S): at 12:38

## 2024-02-26 RX ADMIN — CARBIDOPA AND LEVODOPA 1 TABLET(S): 25; 100 TABLET ORAL at 12:37

## 2024-02-26 RX ADMIN — QUETIAPINE FUMARATE 50 MILLIGRAM(S): 200 TABLET, FILM COATED ORAL at 12:38

## 2024-02-26 RX ADMIN — Medication 1 APPLICATION(S): at 08:43

## 2024-02-26 RX ADMIN — Medication 1 TABLET(S): at 08:41

## 2024-02-26 RX ADMIN — QUETIAPINE FUMARATE 50 MILLIGRAM(S): 200 TABLET, FILM COATED ORAL at 08:41

## 2024-02-26 RX ADMIN — QUETIAPINE FUMARATE 150 MILLIGRAM(S): 200 TABLET, FILM COATED ORAL at 20:37

## 2024-02-26 NOTE — BH INPATIENT PSYCHIATRY PROGRESS NOTE - MSE UNSTRUCTURED FT
Pt appears stated age, awake,  alert, poor relatedness  though sl more able to engage her. Stable gait noted.  Speech hard to understand due to poor articulation and edentulous at times sings her conversion. Has tremor R more than L but no evidence of rigidity, some anteroflexionMood somewhat  irritable, affect labile though less severe No suicidal/homicidal ideas/plan expressed. Religiously and somatically focused with reigious delusions, some suspiciousness w/o well formed paranoid delusions Thinking disjointed. Cannot assess cognition. Poor insight and judgement, is impulsive but more directable

## 2024-02-26 NOTE — BH INPATIENT PSYCHIATRY PROGRESS NOTE - NSBHCHARTREVIEWVS_PSY_A_CORE FT
Vital Signs Last 24 Hrs  T(C): --  T(F): --  HR: 83 (02-26-24 @ 08:36) (83 - 83)  BP: 145/85 (02-26-24 @ 08:36) (145/85 - 145/85)  BP(mean): --  RR: --  SpO2: 100% (02-26-24 @ 08:36) (100% - 100%)    Orthostatic VS  02-25-24 @ 09:38  Lying BP: --/-- HR: --  Sitting BP: 129/63 HR: 86  Standing BP: 138/69 HR: 83  Site: upper left arm  Mode: electronic

## 2024-02-26 NOTE — CHART NOTE - NSCHARTNOTEFT_GEN_A_CORE
Called by primary team to evaluate pt for pain.     Per primary team pt was reaching and then stumbled but did not end up falling or bumping into any objects. After this happened pt started to complain of hip/ knee pain.     On my evaluation pt c/o of pain achy pain around her waist where a belt would be. Be also pointed at the b/l lateral aspect of her upper thigh. She also noted knee pain without swelling or redness. Of note, patient overall poor historian and it was challenging to obtain full sequential story. She answered most yes and no questions well.     PHYSICAL EXAM:  NAD  Eyes: no scleral icterus or injection  ENMT: supple, no lymphadenopathy, no palpable mass  Gastrointestinal: Soft, NT/ND,   MSK: Mild TTP in lower  back b/l. No midline point TTP. Pt noted pain was better with massage. No swelling of b/l hip, bruise, erythema or warmth appreciated. Knees w/o swelling, erythema or warmth. No crepitus appreciated. No TTP unless asked. R knee w/ palpable non painful nodule that pt reports to be chronic. Pt able to ambulate but she does it slumped over stating its secondary to pain. However, she was able to ambulate upright with prompting but then went back to slouching.   Psychiatric: Calm    Recommendations:  No alarming signs at this time. Especially in absence of any reported trauma  -Conservative management for now w/ Tylenol PRN   -Pt able to ambulate slowly however primarily bent over and looking down. Would recommend ambulation with assistance for now/ fall precautions

## 2024-02-26 NOTE — BH INPATIENT PSYCHIATRY PROGRESS NOTE - PRN MEDS
MEDICATIONS  (PRN):  acetaminophen     Tablet .. 650 milliGRAM(s) Oral every 6 hours PRN Mild Pain (1 - 3)  acetaminophen     Tablet .. 650 milliGRAM(s) Oral every 6 hours PRN Mild Pain (1 - 3), Moderate Pain (4 - 6)  albuterol    90 MICROgram(s) HFA Inhaler 2 Puff(s) Inhalation every 6 hours PRN Shortness of Breath and/or Wheezing  aluminum hydroxide/magnesium hydroxide/simethicone Suspension 30 milliLiter(s) Oral every 6 hours PRN Dyspepsia  bisacodyl Suppository 10 milliGRAM(s) Rectal daily PRN constipation  dextrose Oral Gel 15 Gram(s) Oral once PRN Blood Glucose LESS THAN 70 milliGRAM(s)/deciliter  haloperidol     Tablet 2.5 milliGRAM(s) Oral every 6 hours PRN agitation  haloperidol    Injectable 2.5 milliGRAM(s) IntraMuscular once PRN Severe Agitation  haloperidol    Injectable 5 milliGRAM(s) IntraMuscular once PRN severe agitation  simethicone 80 milliGRAM(s) Chew every 8 hours PRN gas

## 2024-02-26 NOTE — BH INPATIENT PSYCHIATRY PROGRESS NOTE - CURRENT MEDICATION
MEDICATIONS  (STANDING):  ammonium lactate 12% Lotion 1 Application(s) Topical two times a day  carbidopa/levodopa  25/100 1 Tablet(s) Oral three times a day  clonazePAM Oral Disintegrating Tablet 0.25 milliGRAM(s) Oral three times a day  dextrose 5%. 1000 milliLiter(s) (100 mL/Hr) IV Continuous <Continuous>  dextrose 5%. 1000 milliLiter(s) (50 mL/Hr) IV Continuous <Continuous>  dextrose 50% Injectable 25 Gram(s) IV Push once  dextrose 50% Injectable 12.5 Gram(s) IV Push once  dextrose 50% Injectable 25 Gram(s) IV Push once  divalproex Sprinkle 500 milliGRAM(s) Oral <User Schedule>  glucagon  Injectable 1 milliGRAM(s) IntraMuscular once  haloperidol     Tablet 2.5 milliGRAM(s) Oral three times a day  hemorrhoidal Ointment 1 Application(s) Rectal daily  hydrocortisone 2.5% Ointment 1 Application(s) Topical two times a day  insulin lispro (ADMELOG) corrective regimen sliding scale   SubCutaneous three times a day before meals  levothyroxine 75 MICROGram(s) Oral daily  lisinopril 20 milliGRAM(s) Oral daily  metFORMIN 500 milliGRAM(s) Oral two times a day  metoprolol succinate ER 50 milliGRAM(s) Oral daily  multivitamin 1 Tablet(s) Oral <User Schedule>  polyethylene glycol 3350 17 Gram(s) Oral daily  QUEtiapine 50 milliGRAM(s) Oral <User Schedule>  QUEtiapine 150 milliGRAM(s) Oral at bedtime  senna 2 Tablet(s) Oral at bedtime  valACYclovir 1000 milliGRAM(s) Oral every 12 hours    MEDICATIONS  (PRN):  acetaminophen     Tablet .. 650 milliGRAM(s) Oral every 6 hours PRN Mild Pain (1 - 3)  acetaminophen     Tablet .. 650 milliGRAM(s) Oral every 6 hours PRN Mild Pain (1 - 3), Moderate Pain (4 - 6)  albuterol    90 MICROgram(s) HFA Inhaler 2 Puff(s) Inhalation every 6 hours PRN Shortness of Breath and/or Wheezing  aluminum hydroxide/magnesium hydroxide/simethicone Suspension 30 milliLiter(s) Oral every 6 hours PRN Dyspepsia  bisacodyl Suppository 10 milliGRAM(s) Rectal daily PRN constipation  dextrose Oral Gel 15 Gram(s) Oral once PRN Blood Glucose LESS THAN 70 milliGRAM(s)/deciliter  haloperidol     Tablet 2.5 milliGRAM(s) Oral every 6 hours PRN agitation  haloperidol    Injectable 2.5 milliGRAM(s) IntraMuscular once PRN Severe Agitation  haloperidol    Injectable 5 milliGRAM(s) IntraMuscular once PRN severe agitation  simethicone 80 milliGRAM(s) Chew every 8 hours PRN gas

## 2024-02-26 NOTE — BH INPATIENT PSYCHIATRY PROGRESS NOTE - NSBHFUPINTERVALHXFT_PSY_A_CORE
Patient seen for schizoaffective disorder, chart reviewed and discussed with nursing staff. No PRNs needed overnight. Compliant with meds. Vitals are stable, no orthostatic changes. As per staff, Pt remains easily agitated, loud and labile. Reports sleep and appetite as very good. She remains talkative and intrusive at times but overall calmer and redirectable.

## 2024-02-26 NOTE — BH INPATIENT PSYCHIATRY PROGRESS NOTE - NSBHASSESSSUMMFT_PSY_ALL_CORE
2/5 Patient showing some improving trend will give more time, if needs more medication titration due to ongoing symptoms will increase Seroquel not haldol  2/6 Improving cont meds, offered prn suppository and simethicone  2/7 Slowly improving will cont current meds, eval need for further increase Seroquel  2/8 Partial response continue meds except will increase Seroquel as was on 250mg/d PTA  2/9 Overall gains, still regresses and becomes difficult to manage will increase Seroquel to 200mg/d in divided dosing  2/12 A little calmer since started on additional mid day, cont other meds w/o change  2/13 Patient better still disorganized, hyper Latter day but less agitated, cont current treatment  2/14 Improved globally with some symptom variability. Cont current med consider further increment Seroquel  2/15 Improved cont current regimen for now  2/16 Showing  some gains continue treatment for now at current doses   2/20 Improved, cont current rx. Scheduled urogyn for pessary replacement  2/21 Improved with variable intrusiveness. Cont meds will increase Miralax and Senna  2/22 Improving cont current rx, observe for effect of re-titration of laxatives  2/23 Lost balance mainly due to behavior however to be safe will lower haldol and Klonopin modestly and observe. Otherwise generally manageable  2/24: No PRNs needed overnight. Compliant with meds. As per staff last night pt was banging her Bible against the wall and lost her balance, staff held her. Pt remains talkative and intrusive at times but overall calmer and redirectable.   2/25: remains labile, loud, easily agitated but more redirectable.  2/26 Patient seen by medicine no evidence injury needing imaging, just Tylenol prn. Will cont current meds, if anteroflexion dosesn't improve with recent drop in haldol will consider further dose reduction

## 2024-02-26 NOTE — BH INPATIENT PSYCHIATRY PROGRESS NOTE - NSBHMETABOLIC_PSY_ALL_CORE_FT
BMI: BMI (kg/m2): 30.2 (01-15-24 @ 12:52)  HbA1c: A1C with Estimated Average Glucose Result: 7.6 % (01-12-24 @ 12:40)    Glucose: POCT Blood Glucose.: 101 mg/dL (02-26-24 @ 12:23)    BP: 145/85 (02-26-24 @ 08:36) (145/85 - 145/85)Vital Signs Last 24 Hrs  T(C): --  T(F): --  HR: 83 (02-26-24 @ 08:36) (83 - 83)  BP: 145/85 (02-26-24 @ 08:36) (145/85 - 145/85)  BP(mean): --  RR: --  SpO2: 100% (02-26-24 @ 08:36) (100% - 100%)    Orthostatic VS  02-25-24 @ 09:38  Lying BP: --/-- HR: --  Sitting BP: 129/63 HR: 86  Standing BP: 138/69 HR: 83  Site: upper left arm  Mode: electronic    Lipid Panel:

## 2024-02-27 PROCEDURE — 99232 SBSQ HOSP IP/OBS MODERATE 35: CPT

## 2024-02-27 RX ADMIN — VALACYCLOVIR 1000 MILLIGRAM(S): 500 TABLET, FILM COATED ORAL at 21:10

## 2024-02-27 RX ADMIN — CARBIDOPA AND LEVODOPA 1 TABLET(S): 25; 100 TABLET ORAL at 14:30

## 2024-02-27 RX ADMIN — VALACYCLOVIR 1000 MILLIGRAM(S): 500 TABLET, FILM COATED ORAL at 09:42

## 2024-02-27 RX ADMIN — Medication 0.25 MILLIGRAM(S): at 14:30

## 2024-02-27 RX ADMIN — CARBIDOPA AND LEVODOPA 1 TABLET(S): 25; 100 TABLET ORAL at 09:41

## 2024-02-27 RX ADMIN — DIVALPROEX SODIUM 500 MILLIGRAM(S): 500 TABLET, DELAYED RELEASE ORAL at 14:30

## 2024-02-27 RX ADMIN — QUETIAPINE FUMARATE 50 MILLIGRAM(S): 200 TABLET, FILM COATED ORAL at 09:41

## 2024-02-27 RX ADMIN — SENNA PLUS 2 TABLET(S): 8.6 TABLET ORAL at 21:10

## 2024-02-27 RX ADMIN — Medication 75 MICROGRAM(S): at 07:46

## 2024-02-27 RX ADMIN — Medication 1 TABLET(S): at 09:42

## 2024-02-27 RX ADMIN — Medication 0.25 MILLIGRAM(S): at 21:10

## 2024-02-27 RX ADMIN — Medication 0.25 MILLIGRAM(S): at 09:40

## 2024-02-27 RX ADMIN — CARBIDOPA AND LEVODOPA 1 TABLET(S): 25; 100 TABLET ORAL at 21:10

## 2024-02-27 RX ADMIN — LISINOPRIL 20 MILLIGRAM(S): 2.5 TABLET ORAL at 09:42

## 2024-02-27 RX ADMIN — QUETIAPINE FUMARATE 150 MILLIGRAM(S): 200 TABLET, FILM COATED ORAL at 21:10

## 2024-02-27 RX ADMIN — DIVALPROEX SODIUM 500 MILLIGRAM(S): 500 TABLET, DELAYED RELEASE ORAL at 18:09

## 2024-02-27 RX ADMIN — Medication 50 MILLIGRAM(S): at 09:41

## 2024-02-27 RX ADMIN — METFORMIN HYDROCHLORIDE 500 MILLIGRAM(S): 850 TABLET ORAL at 09:42

## 2024-02-27 RX ADMIN — METFORMIN HYDROCHLORIDE 500 MILLIGRAM(S): 850 TABLET ORAL at 21:10

## 2024-02-27 RX ADMIN — DIVALPROEX SODIUM 500 MILLIGRAM(S): 500 TABLET, DELAYED RELEASE ORAL at 09:40

## 2024-02-27 NOTE — BH INPATIENT PSYCHIATRY PROGRESS NOTE - MSE UNSTRUCTURED FT
Pt appears stated age, awake,  alert, poor relatedness  though sl more able to engage her. Stable gait noted.  Speech hard to understand due to poor articulation and edentulous at times sings her conversion. Has increased but no evidence of rigidity, patient more bent overMood somewhat  irritable, affect labile though less severe No suicidal/homicidal ideas/plan expressed. Religiously and somatically focused with Sikhism delusions such as believing she is biblical figure some suspiciousness w/o well formed paranoid delusions Thinking disjointed. Cannot assess cognition. Poor insight and judgement, is impulsive but more directable

## 2024-02-27 NOTE — BH INPATIENT PSYCHIATRY PROGRESS NOTE - NSBHASSESSSUMMFT_PSY_ALL_CORE
2/5 Patient showing some improving trend will give more time, if needs more medication titration due to ongoing symptoms will increase Seroquel not haldol  2/6 Improving cont meds, offered prn suppository and simethicone  2/7 Slowly improving will cont current meds, eval need for further increase Seroquel  2/8 Partial response continue meds except will increase Seroquel as was on 250mg/d PTA  2/9 Overall gains, still regresses and becomes difficult to manage will increase Seroquel to 200mg/d in divided dosing  2/12 A little calmer since started on additional mid day, cont other meds w/o change  2/13 Patient better still disorganized, hyper Worship but less agitated, cont current treatment  2/14 Improved globally with some symptom variability. Cont current med consider further increment Seroquel  2/15 Improved cont current regimen for now  2/16 Showing  some gains continue treatment for now at current doses   2/20 Improved, cont current rx. Scheduled urogyn for pessary replacement  2/21 Improved with variable intrusiveness. Cont meds will increase Miralax and Senna  2/22 Improving cont current rx, observe for effect of re-titration of laxatives  2/23 Lost balance mainly due to behavior however to be safe will lower haldol and Klonopin modestly and observe. Otherwise generally manageable  2/24: No PRNs needed overnight. Compliant with meds. As per staff last night pt was banging her Bible against the wall and lost her balance, staff held her. Pt remains talkative and intrusive at times but overall calmer and redirectable.   2/25: remains labile, loud, easily agitated but more redirectable.  2/26 Patient seen by medicine no evidence injury needing imaging, just Tylenol prn. Will cont current meds, if anteroflexion dosesn't improve with recent drop in haldol will consider further dose reduction  2/27 Has had slow increase in EPS despite no increase in dose and some reduction will hold haldol to reduce EPS and restaret at lower dose

## 2024-02-27 NOTE — BH INPATIENT PSYCHIATRY PROGRESS NOTE - NSBHFUPINTERVALHXFT_PSY_A_CORE
Patient seen for schizoaffective disorder, chart reviewed and discussed with nursing staff. No PRNs needed overnight. Compliant with meds. Vitals are stable, no orthostatic changes.  Reports sleep and appetite as very good. She remains talkative and intrusive at times but overall calmer and redirectable.

## 2024-02-27 NOTE — BH INPATIENT PSYCHIATRY PROGRESS NOTE - CURRENT MEDICATION
MEDICATIONS  (STANDING):  ammonium lactate 12% Lotion 1 Application(s) Topical two times a day  carbidopa/levodopa  25/100 1 Tablet(s) Oral three times a day  clonazePAM Oral Disintegrating Tablet 0.25 milliGRAM(s) Oral three times a day  dextrose 5%. 1000 milliLiter(s) (50 mL/Hr) IV Continuous <Continuous>  dextrose 5%. 1000 milliLiter(s) (100 mL/Hr) IV Continuous <Continuous>  dextrose 50% Injectable 25 Gram(s) IV Push once  dextrose 50% Injectable 12.5 Gram(s) IV Push once  dextrose 50% Injectable 25 Gram(s) IV Push once  divalproex Sprinkle 500 milliGRAM(s) Oral <User Schedule>  glucagon  Injectable 1 milliGRAM(s) IntraMuscular once  hemorrhoidal Ointment 1 Application(s) Rectal daily  hydrocortisone 2.5% Ointment 1 Application(s) Topical two times a day  insulin lispro (ADMELOG) corrective regimen sliding scale   SubCutaneous three times a day before meals  levothyroxine 75 MICROGram(s) Oral daily  lisinopril 20 milliGRAM(s) Oral daily  metFORMIN 500 milliGRAM(s) Oral two times a day  metoprolol succinate ER 50 milliGRAM(s) Oral daily  multivitamin 1 Tablet(s) Oral <User Schedule>  polyethylene glycol 3350 17 Gram(s) Oral daily  QUEtiapine 50 milliGRAM(s) Oral <User Schedule>  QUEtiapine 150 milliGRAM(s) Oral at bedtime  senna 2 Tablet(s) Oral at bedtime  valACYclovir 1000 milliGRAM(s) Oral every 12 hours    MEDICATIONS  (PRN):  acetaminophen     Tablet .. 650 milliGRAM(s) Oral every 6 hours PRN Mild Pain (1 - 3)  acetaminophen     Tablet .. 650 milliGRAM(s) Oral every 6 hours PRN Mild Pain (1 - 3), Moderate Pain (4 - 6)  albuterol    90 MICROgram(s) HFA Inhaler 2 Puff(s) Inhalation every 6 hours PRN Shortness of Breath and/or Wheezing  aluminum hydroxide/magnesium hydroxide/simethicone Suspension 30 milliLiter(s) Oral every 6 hours PRN Dyspepsia  bisacodyl Suppository 10 milliGRAM(s) Rectal daily PRN constipation  dextrose Oral Gel 15 Gram(s) Oral once PRN Blood Glucose LESS THAN 70 milliGRAM(s)/deciliter  haloperidol     Tablet 2.5 milliGRAM(s) Oral every 6 hours PRN agitation  haloperidol    Injectable 5 milliGRAM(s) IntraMuscular once PRN severe agitation  haloperidol    Injectable 2.5 milliGRAM(s) IntraMuscular once PRN Severe Agitation  simethicone 80 milliGRAM(s) Chew every 8 hours PRN gas

## 2024-02-27 NOTE — BH INPATIENT PSYCHIATRY PROGRESS NOTE - PRN MEDS
MEDICATIONS  (PRN):  acetaminophen     Tablet .. 650 milliGRAM(s) Oral every 6 hours PRN Mild Pain (1 - 3)  acetaminophen     Tablet .. 650 milliGRAM(s) Oral every 6 hours PRN Mild Pain (1 - 3), Moderate Pain (4 - 6)  albuterol    90 MICROgram(s) HFA Inhaler 2 Puff(s) Inhalation every 6 hours PRN Shortness of Breath and/or Wheezing  aluminum hydroxide/magnesium hydroxide/simethicone Suspension 30 milliLiter(s) Oral every 6 hours PRN Dyspepsia  bisacodyl Suppository 10 milliGRAM(s) Rectal daily PRN constipation  dextrose Oral Gel 15 Gram(s) Oral once PRN Blood Glucose LESS THAN 70 milliGRAM(s)/deciliter  haloperidol     Tablet 2.5 milliGRAM(s) Oral every 6 hours PRN agitation  haloperidol    Injectable 5 milliGRAM(s) IntraMuscular once PRN severe agitation  haloperidol    Injectable 2.5 milliGRAM(s) IntraMuscular once PRN Severe Agitation  simethicone 80 milliGRAM(s) Chew every 8 hours PRN gas

## 2024-02-27 NOTE — BH INPATIENT PSYCHIATRY PROGRESS NOTE - NSBHMETABOLIC_PSY_ALL_CORE_FT
BMI: BMI (kg/m2): 30.2 (01-15-24 @ 12:52)  HbA1c: A1C with Estimated Average Glucose Result: 7.6 % (01-12-24 @ 12:40)    Glucose: POCT Blood Glucose.: 161 mg/dL (02-26-24 @ 20:28)    BP: 145/85 (02-26-24 @ 08:36) (145/85 - 145/85)Vital Signs Last 24 Hrs  T(C): --  T(F): --  HR: --  BP: --  BP(mean): --  RR: --  SpO2: --      Lipid Panel:

## 2024-02-28 LAB
GLUCOSE BLDC GLUCOMTR-MCNC: 114 MG/DL — HIGH (ref 70–99)
GLUCOSE BLDC GLUCOMTR-MCNC: 116 MG/DL — HIGH (ref 70–99)
GLUCOSE BLDC GLUCOMTR-MCNC: 128 MG/DL — HIGH (ref 70–99)

## 2024-02-28 PROCEDURE — 99232 SBSQ HOSP IP/OBS MODERATE 35: CPT

## 2024-02-28 RX ADMIN — DIVALPROEX SODIUM 500 MILLIGRAM(S): 500 TABLET, DELAYED RELEASE ORAL at 10:43

## 2024-02-28 RX ADMIN — CARBIDOPA AND LEVODOPA 1 TABLET(S): 25; 100 TABLET ORAL at 20:04

## 2024-02-28 RX ADMIN — CARBIDOPA AND LEVODOPA 1 TABLET(S): 25; 100 TABLET ORAL at 10:42

## 2024-02-28 RX ADMIN — SENNA PLUS 2 TABLET(S): 8.6 TABLET ORAL at 20:04

## 2024-02-28 RX ADMIN — POLYETHYLENE GLYCOL 3350 17 GRAM(S): 17 POWDER, FOR SOLUTION ORAL at 10:43

## 2024-02-28 RX ADMIN — LISINOPRIL 20 MILLIGRAM(S): 2.5 TABLET ORAL at 10:42

## 2024-02-28 RX ADMIN — Medication 75 MICROGRAM(S): at 06:55

## 2024-02-28 RX ADMIN — VALACYCLOVIR 1000 MILLIGRAM(S): 500 TABLET, FILM COATED ORAL at 20:07

## 2024-02-28 RX ADMIN — Medication 0.25 MILLIGRAM(S): at 10:41

## 2024-02-28 RX ADMIN — METFORMIN HYDROCHLORIDE 500 MILLIGRAM(S): 850 TABLET ORAL at 10:42

## 2024-02-28 RX ADMIN — CARBIDOPA AND LEVODOPA 1 TABLET(S): 25; 100 TABLET ORAL at 15:35

## 2024-02-28 RX ADMIN — Medication 1 APPLICATION(S): at 20:07

## 2024-02-28 RX ADMIN — QUETIAPINE FUMARATE 50 MILLIGRAM(S): 200 TABLET, FILM COATED ORAL at 10:43

## 2024-02-28 RX ADMIN — Medication 0.25 MILLIGRAM(S): at 20:04

## 2024-02-28 RX ADMIN — Medication 1 TABLET(S): at 10:43

## 2024-02-28 RX ADMIN — VALACYCLOVIR 1000 MILLIGRAM(S): 500 TABLET, FILM COATED ORAL at 10:43

## 2024-02-28 RX ADMIN — Medication 50 MILLIGRAM(S): at 10:42

## 2024-02-28 RX ADMIN — QUETIAPINE FUMARATE 50 MILLIGRAM(S): 200 TABLET, FILM COATED ORAL at 15:35

## 2024-02-28 RX ADMIN — METFORMIN HYDROCHLORIDE 500 MILLIGRAM(S): 850 TABLET ORAL at 20:04

## 2024-02-28 RX ADMIN — Medication 0.25 MILLIGRAM(S): at 15:35

## 2024-02-28 RX ADMIN — DIVALPROEX SODIUM 500 MILLIGRAM(S): 500 TABLET, DELAYED RELEASE ORAL at 15:35

## 2024-02-28 RX ADMIN — QUETIAPINE FUMARATE 150 MILLIGRAM(S): 200 TABLET, FILM COATED ORAL at 20:05

## 2024-02-28 RX ADMIN — DIVALPROEX SODIUM 500 MILLIGRAM(S): 500 TABLET, DELAYED RELEASE ORAL at 20:03

## 2024-02-28 NOTE — BH INPATIENT PSYCHIATRY PROGRESS NOTE - NSBHASSESSSUMMFT_PSY_ALL_CORE
2/5 Patient showing some improving trend will give more time, if needs more medication titration due to ongoing symptoms will increase Seroquel not haldol  2/6 Improving cont meds, offered prn suppository and simethicone  2/7 Slowly improving will cont current meds, eval need for further increase Seroquel  2/8 Partial response continue meds except will increase Seroquel as was on 250mg/d PTA  2/9 Overall gains, still regresses and becomes difficult to manage will increase Seroquel to 200mg/d in divided dosing  2/12 A little calmer since started on additional mid day, cont other meds w/o change  2/13 Patient better still disorganized, hyper Druze but less agitated, cont current treatment  2/14 Improved globally with some symptom variability. Cont current med consider further increment Seroquel  2/15 Improved cont current regimen for now  2/16 Showing  some gains continue treatment for now at current doses   2/20 Improved, cont current rx. Scheduled urogyn for pessary replacement  2/21 Improved with variable intrusiveness. Cont meds will increase Miralax and Senna  2/22 Improving cont current rx, observe for effect of re-titration of laxatives  2/23 Lost balance mainly due to behavior however to be safe will lower haldol and Klonopin modestly and observe. Otherwise generally manageable  2/24: No PRNs needed overnight. Compliant with meds. As per staff last night pt was banging her Bible against the wall and lost her balance, staff held her. Pt remains talkative and intrusive at times but overall calmer and redirectable.   2/25: remains labile, loud, easily agitated but more redirectable.  2/26 Patient seen by medicine no evidence injury needing imaging, just Tylenol prn. Will cont current meds, if anteroflexion dosesn't improve with recent drop in haldol will consider further dose reduction  2/27 Has had slow increase in EPS despite no increase in dose and some reduction will hold haldol to reduce EPS and restart at lower dose  2/28 Patient with change in med tolerance some agitation and sedation. Will hold haldol tonight consider changin to Prolixin as may be a little better for EPS and will check labs to look for any medical issue

## 2024-02-28 NOTE — CHART NOTE - NSCHARTNOTEFT_GEN_A_CORE
Source: Patient [x]    Family [ ]     other [ ]    Diet :Diet, Soft and Bite Sized:   Consistent Carbohydrate {Evening Snack} (CSTCHOSN)  Mildly Thick Liquids (MILDTHICKLIQS)  Supplement Feeding Modality:  Oral  Glucerna Shake Cans or Servings Per Day:  3       Frequency:  Three Times a day (01-26-24 @ 16:33) [Active]        Patient reports [ ] nausea  [ ] vomiting [ ] diarrhea [ ] constipation  [ ]chewing problems [ ] swallowing issues  [x] other: No GI distress noted.       PO intake:  < 50% [ ] 50-75% [ ]   % [x]  other :     Source for PO intake [x] Patient [ ] family [ ] chart [ ] staff [ ] other    Saw Pt in dining area, having breakfast this am. Pt with hand tremors. States sometimes needs to be fed. Reports good appetite/po intake. No GI distress noted.      Current Weight: 172 lbs       Pertinent Medications: MEDICATIONS  (STANDING):  ammonium lactate 12% Lotion 1 Application(s) Topical two times a day  carbidopa/levodopa  25/100 1 Tablet(s) Oral three times a day  clonazePAM Oral Disintegrating Tablet 0.25 milliGRAM(s) Oral three times a day  dextrose 5%. 1000 milliLiter(s) (50 mL/Hr) IV Continuous <Continuous>  dextrose 5%. 1000 milliLiter(s) (100 mL/Hr) IV Continuous <Continuous>  dextrose 50% Injectable 25 Gram(s) IV Push once  dextrose 50% Injectable 25 Gram(s) IV Push once  dextrose 50% Injectable 12.5 Gram(s) IV Push once  divalproex Sprinkle 500 milliGRAM(s) Oral <User Schedule>  glucagon  Injectable 1 milliGRAM(s) IntraMuscular once  hemorrhoidal Ointment 1 Application(s) Rectal daily  hydrocortisone 2.5% Ointment 1 Application(s) Topical two times a day  insulin lispro (ADMELOG) corrective regimen sliding scale   SubCutaneous three times a day before meals  levothyroxine 75 MICROGram(s) Oral daily  lisinopril 20 milliGRAM(s) Oral daily  metFORMIN 500 milliGRAM(s) Oral two times a day  metoprolol succinate ER 50 milliGRAM(s) Oral daily  multivitamin 1 Tablet(s) Oral <User Schedule>  polyethylene glycol 3350 17 Gram(s) Oral daily  QUEtiapine 50 milliGRAM(s) Oral <User Schedule>  QUEtiapine 150 milliGRAM(s) Oral at bedtime  senna 2 Tablet(s) Oral at bedtime  valACYclovir 1000 milliGRAM(s) Oral every 12 hours    MEDICATIONS  (PRN):  acetaminophen     Tablet .. 650 milliGRAM(s) Oral every 6 hours PRN Mild Pain (1 - 3)  acetaminophen     Tablet .. 650 milliGRAM(s) Oral every 6 hours PRN Mild Pain (1 - 3), Moderate Pain (4 - 6)  albuterol    90 MICROgram(s) HFA Inhaler 2 Puff(s) Inhalation every 6 hours PRN Shortness of Breath and/or Wheezing  aluminum hydroxide/magnesium hydroxide/simethicone Suspension 30 milliLiter(s) Oral every 6 hours PRN Dyspepsia  bisacodyl Suppository 10 milliGRAM(s) Rectal daily PRN constipation  dextrose Oral Gel 15 Gram(s) Oral once PRN Blood Glucose LESS THAN 70 milliGRAM(s)/deciliter  haloperidol     Tablet 2.5 milliGRAM(s) Oral every 6 hours PRN agitation  haloperidol    Injectable 2.5 milliGRAM(s) IntraMuscular once PRN Severe Agitation  haloperidol    Injectable 5 milliGRAM(s) IntraMuscular once PRN severe agitation  simethicone 80 milliGRAM(s) Chew every 8 hours PRN gas    Pertinent Labs:    CAPILLARY BLOOD GLUCOSE    POCT Blood Glucose.: 114 mg/dL (28 Feb 2024 08:23)        Skin: Intact     Estimated Needs:   [x] no change since previous assessment  [ ] recalculated:       Previous Nutrition Diagnosis:     [x] Inadequate Protein-Energy Intake          Nutrition Diagnosis is [ ] ongoing  [x] resolved [ ] not applicable          New Nutrition Diagnosis: [x] not applicable    [ ] Inadequate Protein Energy Intake [ ]Inadequate Oral Intake [ ] Excessive Energy Intake     [ ] Underweight [ ] Increased Nutrient Needs [ ] Overweight/Obesity     [ ] Altered GI Function [ ] Unintended Weight Loss [ ] Food & Nutrition Related Knowledge Deficit[ ] Limited Adherence to nutrition related recommendations [ ] Malnutrition  [ ] other: Free text        Recommend    [ ] Change Diet To:    [ ] Nutrition Supplement    [ ] Nutrition Support    [x] Other: Continue current diet order.    [x] Assistance with meals     Monitoring and Evaluation:     [x] PO intake [ ] Tolerance to diet prescription [x] weekly weights [x] follow up per protocol    [x] other: Lisa Mccray RDN Pager [phone]

## 2024-02-28 NOTE — BH INPATIENT PSYCHIATRY PROGRESS NOTE - MSE UNSTRUCTURED FT
Pt appears stated age, awake,  alert, poor relatedness Speech hard to understand due to poor articulation and edentulous at times sings her conversion. Has increased tremor but no evidence of rigidity, patient more bent overMood somewhat  irritable, affect labile though less severe No suicidal/homicidal ideas/plan expressed. Religiously and somatically focused with Congregational delusions such as believing she is biblical figure some suspiciousness w/o well formed paranoid delusions Thinking disjointed. Cannot assess cognition. Poor insight and judgement, is impulsive

## 2024-02-28 NOTE — BH INPATIENT PSYCHIATRY PROGRESS NOTE - NSBHMETABOLIC_PSY_ALL_CORE_FT
BMI: BMI (kg/m2): 30.2 (01-15-24 @ 12:52)  HbA1c: A1C with Estimated Average Glucose Result: 7.6 % (01-12-24 @ 12:40)    Glucose: POCT Blood Glucose.: 114 mg/dL (02-28-24 @ 08:23)    BP: 145/85 (02-26-24 @ 08:36) (145/85 - 145/85)Vital Signs Last 24 Hrs  T(C): --  T(F): --  HR: --  BP: --  BP(mean): --  RR: --  SpO2: --      Lipid Panel:

## 2024-02-28 NOTE — BH INPATIENT PSYCHIATRY PROGRESS NOTE - CURRENT MEDICATION
MEDICATIONS  (STANDING):  ammonium lactate 12% Lotion 1 Application(s) Topical two times a day  carbidopa/levodopa  25/100 1 Tablet(s) Oral three times a day  clonazePAM Oral Disintegrating Tablet 0.25 milliGRAM(s) Oral three times a day  dextrose 5%. 1000 milliLiter(s) (50 mL/Hr) IV Continuous <Continuous>  dextrose 5%. 1000 milliLiter(s) (100 mL/Hr) IV Continuous <Continuous>  dextrose 50% Injectable 25 Gram(s) IV Push once  dextrose 50% Injectable 12.5 Gram(s) IV Push once  dextrose 50% Injectable 25 Gram(s) IV Push once  divalproex Sprinkle 500 milliGRAM(s) Oral <User Schedule>  glucagon  Injectable 1 milliGRAM(s) IntraMuscular once  hemorrhoidal Ointment 1 Application(s) Rectal daily  hydrocortisone 2.5% Ointment 1 Application(s) Topical two times a day  insulin lispro (ADMELOG) corrective regimen sliding scale   SubCutaneous three times a day before meals  levothyroxine 75 MICROGram(s) Oral daily  lisinopril 20 milliGRAM(s) Oral daily  metFORMIN 500 milliGRAM(s) Oral two times a day  metoprolol succinate ER 50 milliGRAM(s) Oral daily  multivitamin 1 Tablet(s) Oral <User Schedule>  polyethylene glycol 3350 17 Gram(s) Oral daily  QUEtiapine 150 milliGRAM(s) Oral at bedtime  QUEtiapine 50 milliGRAM(s) Oral <User Schedule>  senna 2 Tablet(s) Oral at bedtime  valACYclovir 1000 milliGRAM(s) Oral every 12 hours    MEDICATIONS  (PRN):  acetaminophen     Tablet .. 650 milliGRAM(s) Oral every 6 hours PRN Mild Pain (1 - 3)  acetaminophen     Tablet .. 650 milliGRAM(s) Oral every 6 hours PRN Mild Pain (1 - 3), Moderate Pain (4 - 6)  albuterol    90 MICROgram(s) HFA Inhaler 2 Puff(s) Inhalation every 6 hours PRN Shortness of Breath and/or Wheezing  aluminum hydroxide/magnesium hydroxide/simethicone Suspension 30 milliLiter(s) Oral every 6 hours PRN Dyspepsia  bisacodyl Suppository 10 milliGRAM(s) Rectal daily PRN constipation  dextrose Oral Gel 15 Gram(s) Oral once PRN Blood Glucose LESS THAN 70 milliGRAM(s)/deciliter  haloperidol     Tablet 2.5 milliGRAM(s) Oral every 6 hours PRN agitation  haloperidol    Injectable 2.5 milliGRAM(s) IntraMuscular once PRN Severe Agitation  haloperidol    Injectable 5 milliGRAM(s) IntraMuscular once PRN severe agitation  simethicone 80 milliGRAM(s) Chew every 8 hours PRN gas

## 2024-02-28 NOTE — ED ADULT NURSE NOTE - OBJECTIVE STATEMENT
stretcher
pt received spot 27. pt A+Ox3, sent from NH for psych eval. pt family members requesting a psych evaluation for erratic behavior. pt states was sent for "my behavior." pt is calm and cooperative. denies SI/HI/auditory or visual hallucination. respirations even and unlabored. denies pain and discomfort. NAD noted. will monitor.

## 2024-02-28 NOTE — BH INPATIENT PSYCHIATRY PROGRESS NOTE - NSBHFUPINTERVALHXFT_PSY_A_CORE
Patient seen for schizoaffective disorder, chart reviewed and discussed with nursing staff. No PRNs needed overnight. Compliant with meds. refused vitals.  Reports sleep and appetite as very good. She remains talkative and intrusive at times but overall calmer and redirectable. Staff has impression she exaggerates tremor when observed

## 2024-02-29 LAB
ALBUMIN SERPL ELPH-MCNC: 3.8 G/DL — SIGNIFICANT CHANGE UP (ref 3.3–5)
ALP SERPL-CCNC: 102 U/L — SIGNIFICANT CHANGE UP (ref 40–120)
ALT FLD-CCNC: 9 U/L — SIGNIFICANT CHANGE UP (ref 4–33)
ANION GAP SERPL CALC-SCNC: 10 MMOL/L — SIGNIFICANT CHANGE UP (ref 7–14)
APPEARANCE UR: CLEAR — SIGNIFICANT CHANGE UP
AST SERPL-CCNC: 24 U/L — SIGNIFICANT CHANGE UP (ref 4–32)
BACTERIA # UR AUTO: NEGATIVE /HPF — SIGNIFICANT CHANGE UP
BASOPHILS # BLD AUTO: 0.03 K/UL — SIGNIFICANT CHANGE UP (ref 0–0.2)
BASOPHILS NFR BLD AUTO: 0.5 % — SIGNIFICANT CHANGE UP (ref 0–2)
BILIRUB SERPL-MCNC: <0.2 MG/DL — SIGNIFICANT CHANGE UP (ref 0.2–1.2)
BILIRUB UR-MCNC: NEGATIVE — SIGNIFICANT CHANGE UP
BUN SERPL-MCNC: 22 MG/DL — SIGNIFICANT CHANGE UP (ref 7–23)
CALCIUM SERPL-MCNC: 9.2 MG/DL — SIGNIFICANT CHANGE UP (ref 8.4–10.5)
CAST: 0 /LPF — SIGNIFICANT CHANGE UP (ref 0–4)
CHLORIDE SERPL-SCNC: 106 MMOL/L — SIGNIFICANT CHANGE UP (ref 98–107)
CO2 SERPL-SCNC: 23 MMOL/L — SIGNIFICANT CHANGE UP (ref 22–31)
COLOR SPEC: YELLOW — SIGNIFICANT CHANGE UP
CREAT SERPL-MCNC: 0.78 MG/DL — SIGNIFICANT CHANGE UP (ref 0.5–1.3)
DIFF PNL FLD: NEGATIVE — SIGNIFICANT CHANGE UP
EGFR: 81 ML/MIN/1.73M2 — SIGNIFICANT CHANGE UP
EOSINOPHIL # BLD AUTO: 0.13 K/UL — SIGNIFICANT CHANGE UP (ref 0–0.5)
EOSINOPHIL NFR BLD AUTO: 2.2 % — SIGNIFICANT CHANGE UP (ref 0–6)
GLUCOSE BLDC GLUCOMTR-MCNC: 114 MG/DL — HIGH (ref 70–99)
GLUCOSE BLDC GLUCOMTR-MCNC: 115 MG/DL — HIGH (ref 70–99)
GLUCOSE BLDC GLUCOMTR-MCNC: 172 MG/DL — HIGH (ref 70–99)
GLUCOSE BLDC GLUCOMTR-MCNC: 210 MG/DL — HIGH (ref 70–99)
GLUCOSE SERPL-MCNC: 129 MG/DL — HIGH (ref 70–99)
GLUCOSE UR QL: NEGATIVE MG/DL — SIGNIFICANT CHANGE UP
HCT VFR BLD CALC: 31.6 % — LOW (ref 34.5–45)
HGB BLD-MCNC: 10.2 G/DL — LOW (ref 11.5–15.5)
IANC: 3.59 K/UL — SIGNIFICANT CHANGE UP (ref 1.8–7.4)
IMM GRANULOCYTES NFR BLD AUTO: 0.5 % — SIGNIFICANT CHANGE UP (ref 0–0.9)
KETONES UR-MCNC: NEGATIVE MG/DL — SIGNIFICANT CHANGE UP
LEUKOCYTE ESTERASE UR-ACNC: ABNORMAL
LYMPHOCYTES # BLD AUTO: 1.54 K/UL — SIGNIFICANT CHANGE UP (ref 1–3.3)
LYMPHOCYTES # BLD AUTO: 25.8 % — SIGNIFICANT CHANGE UP (ref 13–44)
MCHC RBC-ENTMCNC: 28.4 PG — SIGNIFICANT CHANGE UP (ref 27–34)
MCHC RBC-ENTMCNC: 32.3 GM/DL — SIGNIFICANT CHANGE UP (ref 32–36)
MCV RBC AUTO: 88 FL — SIGNIFICANT CHANGE UP (ref 80–100)
MONOCYTES # BLD AUTO: 0.66 K/UL — SIGNIFICANT CHANGE UP (ref 0–0.9)
MONOCYTES NFR BLD AUTO: 11 % — SIGNIFICANT CHANGE UP (ref 2–14)
NEUTROPHILS # BLD AUTO: 3.59 K/UL — SIGNIFICANT CHANGE UP (ref 1.8–7.4)
NEUTROPHILS NFR BLD AUTO: 60 % — SIGNIFICANT CHANGE UP (ref 43–77)
NITRITE UR-MCNC: NEGATIVE — SIGNIFICANT CHANGE UP
NRBC # BLD: 0 /100 WBCS — SIGNIFICANT CHANGE UP (ref 0–0)
NRBC # FLD: 0 K/UL — SIGNIFICANT CHANGE UP (ref 0–0)
PH UR: 8 — SIGNIFICANT CHANGE UP (ref 5–8)
PLATELET # BLD AUTO: 184 K/UL — SIGNIFICANT CHANGE UP (ref 150–400)
POTASSIUM SERPL-MCNC: 4.5 MMOL/L — SIGNIFICANT CHANGE UP (ref 3.5–5.3)
POTASSIUM SERPL-SCNC: 4.5 MMOL/L — SIGNIFICANT CHANGE UP (ref 3.5–5.3)
PROT SERPL-MCNC: 7.3 G/DL — SIGNIFICANT CHANGE UP (ref 6–8.3)
PROT UR-MCNC: NEGATIVE MG/DL — SIGNIFICANT CHANGE UP
RBC # BLD: 3.59 M/UL — LOW (ref 3.8–5.2)
RBC # FLD: 17.2 % — HIGH (ref 10.3–14.5)
RBC CASTS # UR COMP ASSIST: 8 /HPF — HIGH (ref 0–4)
SODIUM SERPL-SCNC: 139 MMOL/L — SIGNIFICANT CHANGE UP (ref 135–145)
SP GR SPEC: 1.01 — SIGNIFICANT CHANGE UP (ref 1–1.03)
SQUAMOUS # UR AUTO: 2 /HPF — SIGNIFICANT CHANGE UP (ref 0–5)
UROBILINOGEN FLD QL: 0.2 MG/DL — SIGNIFICANT CHANGE UP (ref 0.2–1)
WBC # BLD: 5.98 K/UL — SIGNIFICANT CHANGE UP (ref 3.8–10.5)
WBC # FLD AUTO: 5.98 K/UL — SIGNIFICANT CHANGE UP (ref 3.8–10.5)
WBC UR QL: 5 /HPF — SIGNIFICANT CHANGE UP (ref 0–5)

## 2024-02-29 PROCEDURE — 99232 SBSQ HOSP IP/OBS MODERATE 35: CPT

## 2024-02-29 RX ORDER — HALOPERIDOL DECANOATE 100 MG/ML
1 INJECTION INTRAMUSCULAR
Refills: 0 | Status: DISCONTINUED | OUTPATIENT
Start: 2024-02-29 | End: 2024-03-18

## 2024-02-29 RX ORDER — CLONAZEPAM 1 MG
0.25 TABLET ORAL THREE TIMES A DAY
Refills: 0 | Status: DISCONTINUED | OUTPATIENT
Start: 2024-02-29 | End: 2024-03-05

## 2024-02-29 RX ORDER — DIVALPROEX SODIUM 500 MG/1
500 TABLET, DELAYED RELEASE ORAL THREE TIMES A DAY
Refills: 0 | Status: DISCONTINUED | OUTPATIENT
Start: 2024-02-29 | End: 2024-03-27

## 2024-02-29 RX ORDER — HALOPERIDOL DECANOATE 100 MG/ML
2 INJECTION INTRAMUSCULAR
Refills: 0 | Status: DISCONTINUED | OUTPATIENT
Start: 2024-02-29 | End: 2024-02-29

## 2024-02-29 RX ORDER — QUETIAPINE FUMARATE 200 MG/1
50 TABLET, FILM COATED ORAL EVERY 6 HOURS
Refills: 0 | Status: DISCONTINUED | OUTPATIENT
Start: 2024-02-29 | End: 2024-03-20

## 2024-02-29 RX ADMIN — Medication 0.25 MILLIGRAM(S): at 13:11

## 2024-02-29 RX ADMIN — Medication 75 MICROGRAM(S): at 05:07

## 2024-02-29 RX ADMIN — DIVALPROEX SODIUM 500 MILLIGRAM(S): 500 TABLET, DELAYED RELEASE ORAL at 20:52

## 2024-02-29 RX ADMIN — QUETIAPINE FUMARATE 50 MILLIGRAM(S): 200 TABLET, FILM COATED ORAL at 13:12

## 2024-02-29 RX ADMIN — Medication 0.25 MILLIGRAM(S): at 20:52

## 2024-02-29 RX ADMIN — DIVALPROEX SODIUM 500 MILLIGRAM(S): 500 TABLET, DELAYED RELEASE ORAL at 13:15

## 2024-02-29 RX ADMIN — LISINOPRIL 20 MILLIGRAM(S): 2.5 TABLET ORAL at 08:14

## 2024-02-29 RX ADMIN — DIVALPROEX SODIUM 500 MILLIGRAM(S): 500 TABLET, DELAYED RELEASE ORAL at 08:15

## 2024-02-29 RX ADMIN — HALOPERIDOL DECANOATE 2.5 MILLIGRAM(S): 100 INJECTION INTRAMUSCULAR at 08:13

## 2024-02-29 RX ADMIN — SENNA PLUS 2 TABLET(S): 8.6 TABLET ORAL at 20:52

## 2024-02-29 RX ADMIN — Medication 0.25 MILLIGRAM(S): at 08:13

## 2024-02-29 RX ADMIN — METFORMIN HYDROCHLORIDE 500 MILLIGRAM(S): 850 TABLET ORAL at 20:52

## 2024-02-29 RX ADMIN — QUETIAPINE FUMARATE 150 MILLIGRAM(S): 200 TABLET, FILM COATED ORAL at 20:52

## 2024-02-29 RX ADMIN — Medication 1 TABLET(S): at 20:52

## 2024-02-29 RX ADMIN — CARBIDOPA AND LEVODOPA 1 TABLET(S): 25; 100 TABLET ORAL at 13:12

## 2024-02-29 RX ADMIN — VALACYCLOVIR 1000 MILLIGRAM(S): 500 TABLET, FILM COATED ORAL at 08:14

## 2024-02-29 RX ADMIN — QUETIAPINE FUMARATE 50 MILLIGRAM(S): 200 TABLET, FILM COATED ORAL at 08:14

## 2024-02-29 RX ADMIN — CARBIDOPA AND LEVODOPA 1 TABLET(S): 25; 100 TABLET ORAL at 08:14

## 2024-02-29 RX ADMIN — Medication 50 MILLIGRAM(S): at 08:14

## 2024-02-29 RX ADMIN — VALACYCLOVIR 1000 MILLIGRAM(S): 500 TABLET, FILM COATED ORAL at 20:52

## 2024-02-29 RX ADMIN — Medication 1 TABLET(S): at 08:14

## 2024-02-29 RX ADMIN — Medication 1 APPLICATION(S): at 20:54

## 2024-02-29 RX ADMIN — POLYETHYLENE GLYCOL 3350 17 GRAM(S): 17 POWDER, FOR SOLUTION ORAL at 08:12

## 2024-02-29 RX ADMIN — CARBIDOPA AND LEVODOPA 1 TABLET(S): 25; 100 TABLET ORAL at 20:53

## 2024-02-29 RX ADMIN — METFORMIN HYDROCHLORIDE 500 MILLIGRAM(S): 850 TABLET ORAL at 08:12

## 2024-02-29 RX ADMIN — HALOPERIDOL DECANOATE 1 MILLIGRAM(S): 100 INJECTION INTRAMUSCULAR at 20:53

## 2024-02-29 NOTE — BH INPATIENT PSYCHIATRY PROGRESS NOTE - MSE UNSTRUCTURED FT
Pt appears stated age, awake,  alert, poor relatedness Speech hard to understand due to poor articulation and edentulous at times sings her conversion. Has increased tremor but no evidence of rigidity, patient with anteroflexion unchangedMood somewhat  irritable, affect labile though less severe No suicidal/homicidal ideas/plan expressed. Religiously and somatically focused with Yarsani delusions such as believing she is biblical figure some suspiciousness w/o well formed paranoid delusions Thinking disjointed. Cannot assess cognition. Poor insight and judgement, is impulsive

## 2024-02-29 NOTE — PHARMACOTHERAPY INTERVENTION NOTE - COMMENTS
In monitoring patients on levothyroxine for drug-drug interactions and timing of administration, identified patient with an order for multivitamin, which contains calcium and iron, scheduled for daily at 0900. Levothyroxine defaults to daily at 0600. Concurrent use of levothyroxine and iron-, aluminum-, calcium- or magnesium-containing products may result in decreased levothyroxine absorption and must be  from levothyroxine by at least 4 hours.     Recommended adjusting dosing schedule for multivitamin to bedtime to avoid the interaction, as clinically appropriate.    Gunner Conway, PharmD.  
spoke to dr. Barron that it's clotrimazole 1% vaginal is usually Hs. Also we only have 2% so recommended to change to 2% for 3 days.
Concurrent use of LEVOTHYROXINE, ANTACIDS (e.g., magnesium and aluminum), and SIMETHICONE may result in decreased levothyroxine effectiveness.    Added administration instruction to PRN order for aluminum hydroxide/magnesium hydroxide/simethicone suspension to "Do not administer within 4 hours of levothyroxine."    Marybeth Blount PharmD, BCPP  
Concurrent use of LEVOTHYROXINE, ANTACIDS (e.g., magnesium and aluminum), and SIMETHICONE may result in decreased levothyroxine effectiveness.    Added administration instruction to PRN order for simethicone to "Do not administer within 4 hours of levothyroxine."    Marybeth Blount, CrD, BCPP  
Contacted Dr Flower regarding changing the time for Multivitamin admin to bedtime since patient is on Levothyroxin, MD approved

## 2024-02-29 NOTE — BH INPATIENT PSYCHIATRY PROGRESS NOTE - NSBHMETABOLIC_PSY_ALL_CORE_FT
BMI: BMI (kg/m2): 30.2 (01-15-24 @ 12:52)  HbA1c: A1C with Estimated Average Glucose Result: 7.6 % (01-12-24 @ 12:40)    Glucose: POCT Blood Glucose.: 115 mg/dL (02-29-24 @ 12:25)    BP: 131/75 (02-28-24 @ 10:00) (131/75 - 131/75)Vital Signs Last 24 Hrs  T(C): 36.4 (02-29-24 @ 06:08), Max: 36.7 (02-28-24 @ 14:51)  T(F): 97.5 (02-29-24 @ 06:08), Max: 98.1 (02-28-24 @ 14:51)  HR: --  BP: --  BP(mean): --  RR: 18 (02-28-24 @ 14:51) (18 - 18)  SpO2: 97% (02-28-24 @ 14:51) (97% - 97%)    Orthostatic VS  02-29-24 @ 06:08  Lying BP: --/-- HR: --  Sitting BP: 130/75 HR: 86  Standing BP: 141/82 HR: 90  Site: --  Mode: --    Lipid Panel:

## 2024-02-29 NOTE — BH INPATIENT PSYCHIATRY PROGRESS NOTE - NSBHASSESSSUMMFT_PSY_ALL_CORE
2/5 Patient showing some improving trend will give more time, if needs more medication titration due to ongoing symptoms will increase Seroquel not haldol  2/6 Improving cont meds, offered prn suppository and simethicone  2/7 Slowly improving will cont current meds, eval need for further increase Seroquel  2/8 Partial response continue meds except will increase Seroquel as was on 250mg/d PTA  2/9 Overall gains, still regresses and becomes difficult to manage will increase Seroquel to 200mg/d in divided dosing  2/12 A little calmer since started on additional mid day, cont other meds w/o change  2/13 Patient better still disorganized, hyper [place] but less agitated, cont current treatment  2/14 Improved globally with some symptom variability. Cont current med consider further increment Seroquel  2/15 Improved cont current regimen for now  2/16 Showing  some gains continue treatment for now at current doses   2/20 Improved, cont current rx. Scheduled urogyn for pessary replacement  2/21 Improved with variable intrusiveness. Cont meds will increase Miralax and Senna  2/22 Improving cont current rx, observe for effect of re-titration of laxatives  2/23 Lost balance mainly due to behavior however to be safe will lower haldol and Klonopin modestly and observe. Otherwise generally manageable  2/24: No PRNs needed overnight. Compliant with meds. As per staff last night pt was banging her Bible against the wall and lost her balance, staff held her. Pt remains talkative and intrusive at times but overall calmer and redirectable.   2/25: remains labile, loud, easily agitated but more redirectable.  2/26 Patient seen by medicine no evidence injury needing imaging, just Tylenol prn. Will cont current meds, if anteroflexion dosesn't improve with recent drop in haldol will consider further dose reduction  2/27 Has had slow increase in EPS despite no increase in dose and some reduction will hold haldol to reduce EPS and restart at lower dose  2/28 Patient with change in med tolerance some agitation and sedation. Will hold haldol tonight consider changin to Prolixin as may be a little better for EPS and will check labs to look for any medical issue  2/29 Labile irritable, increased tremor improved with washout of haldol. Will restart haldol but see if she can be stabilized at low dose to mitmize tremor. COuld consider increasing Seroquel

## 2024-02-29 NOTE — BH INPATIENT PSYCHIATRY PROGRESS NOTE - NSBHCHARTREVIEWVS_PSY_A_CORE FT
Vital Signs Last 24 Hrs  T(C): 36.4 (02-29-24 @ 06:08), Max: 36.7 (02-28-24 @ 14:51)  T(F): 97.5 (02-29-24 @ 06:08), Max: 98.1 (02-28-24 @ 14:51)  HR: --  BP: --  BP(mean): --  RR: 18 (02-28-24 @ 14:51) (18 - 18)  SpO2: 97% (02-28-24 @ 14:51) (97% - 97%)    Orthostatic VS  02-29-24 @ 06:08  Lying BP: --/-- HR: --  Sitting BP: 130/75 HR: 86  Standing BP: 141/82 HR: 90  Site: --  Mode: --

## 2024-02-29 NOTE — BH INPATIENT PSYCHIATRY PROGRESS NOTE - NSBHFUPINTERVALHXFT_PSY_A_CORE
Patient seen for schizoaffective disorder, chart reviewed and discussed with nursing staff. Got prn this AM. Compliant with meds. VSS  Reports sleep and appetite as very good. She remains talkative and intrusive at times but overall calmer and redirectable. Staff has impression she exaggerates tremor when observed

## 2024-02-29 NOTE — BH INPATIENT PSYCHIATRY PROGRESS NOTE - CURRENT MEDICATION
MEDICATIONS  (STANDING):  ammonium lactate 12% Lotion 1 Application(s) Topical two times a day  carbidopa/levodopa  25/100 1 Tablet(s) Oral three times a day  clonazePAM Oral Disintegrating Tablet 0.25 milliGRAM(s) Oral three times a day  dextrose 5%. 1000 milliLiter(s) (50 mL/Hr) IV Continuous <Continuous>  dextrose 5%. 1000 milliLiter(s) (100 mL/Hr) IV Continuous <Continuous>  dextrose 50% Injectable 25 Gram(s) IV Push once  dextrose 50% Injectable 12.5 Gram(s) IV Push once  dextrose 50% Injectable 25 Gram(s) IV Push once  divalproex Sprinkle 500 milliGRAM(s) Oral three times a day  glucagon  Injectable 1 milliGRAM(s) IntraMuscular once  haloperidol     Tablet 1 milliGRAM(s) Oral two times a day  hemorrhoidal Ointment 1 Application(s) Rectal daily  hydrocortisone 2.5% Ointment 1 Application(s) Topical two times a day  insulin lispro (ADMELOG) corrective regimen sliding scale   SubCutaneous three times a day before meals  levothyroxine 75 MICROGram(s) Oral daily  lisinopril 20 milliGRAM(s) Oral daily  metFORMIN 500 milliGRAM(s) Oral two times a day  metoprolol succinate ER 50 milliGRAM(s) Oral daily  multivitamin 1 Tablet(s) Oral daily  polyethylene glycol 3350 17 Gram(s) Oral daily  QUEtiapine 150 milliGRAM(s) Oral at bedtime  QUEtiapine 50 milliGRAM(s) Oral <User Schedule>  senna 2 Tablet(s) Oral at bedtime  valACYclovir 1000 milliGRAM(s) Oral every 12 hours    MEDICATIONS  (PRN):  acetaminophen     Tablet .. 650 milliGRAM(s) Oral every 6 hours PRN Mild Pain (1 - 3)  acetaminophen     Tablet .. 650 milliGRAM(s) Oral every 6 hours PRN Mild Pain (1 - 3), Moderate Pain (4 - 6)  albuterol    90 MICROgram(s) HFA Inhaler 2 Puff(s) Inhalation every 6 hours PRN Shortness of Breath and/or Wheezing  aluminum hydroxide/magnesium hydroxide/simethicone Suspension 30 milliLiter(s) Oral every 6 hours PRN Dyspepsia  bisacodyl Suppository 10 milliGRAM(s) Rectal daily PRN constipation  dextrose Oral Gel 15 Gram(s) Oral once PRN Blood Glucose LESS THAN 70 milliGRAM(s)/deciliter  haloperidol    Injectable 2.5 milliGRAM(s) IntraMuscular once PRN Severe Agitation  QUEtiapine 50 milliGRAM(s) Oral every 6 hours PRN agitation  simethicone 80 milliGRAM(s) Chew every 8 hours PRN gas

## 2024-02-29 NOTE — BH INPATIENT PSYCHIATRY PROGRESS NOTE - PRN MEDS
MEDICATIONS  (PRN):  acetaminophen     Tablet .. 650 milliGRAM(s) Oral every 6 hours PRN Mild Pain (1 - 3)  acetaminophen     Tablet .. 650 milliGRAM(s) Oral every 6 hours PRN Mild Pain (1 - 3), Moderate Pain (4 - 6)  albuterol    90 MICROgram(s) HFA Inhaler 2 Puff(s) Inhalation every 6 hours PRN Shortness of Breath and/or Wheezing  aluminum hydroxide/magnesium hydroxide/simethicone Suspension 30 milliLiter(s) Oral every 6 hours PRN Dyspepsia  bisacodyl Suppository 10 milliGRAM(s) Rectal daily PRN constipation  dextrose Oral Gel 15 Gram(s) Oral once PRN Blood Glucose LESS THAN 70 milliGRAM(s)/deciliter  haloperidol    Injectable 2.5 milliGRAM(s) IntraMuscular once PRN Severe Agitation  QUEtiapine 50 milliGRAM(s) Oral every 6 hours PRN agitation  simethicone 80 milliGRAM(s) Chew every 8 hours PRN gas

## 2024-03-01 LAB
GLUCOSE BLDC GLUCOMTR-MCNC: 104 MG/DL — HIGH (ref 70–99)
GLUCOSE BLDC GLUCOMTR-MCNC: 138 MG/DL — HIGH (ref 70–99)
GLUCOSE BLDC GLUCOMTR-MCNC: 97 MG/DL — SIGNIFICANT CHANGE UP (ref 70–99)

## 2024-03-01 PROCEDURE — 99232 SBSQ HOSP IP/OBS MODERATE 35: CPT

## 2024-03-01 RX ORDER — SODIUM CHLORIDE 0.65 %
2 AEROSOL, SPRAY (ML) NASAL EVERY 6 HOURS
Refills: 0 | Status: DISCONTINUED | OUTPATIENT
Start: 2024-03-01 | End: 2024-05-17

## 2024-03-01 RX ADMIN — QUETIAPINE FUMARATE 50 MILLIGRAM(S): 200 TABLET, FILM COATED ORAL at 09:45

## 2024-03-01 RX ADMIN — Medication 50 MILLIGRAM(S): at 09:45

## 2024-03-01 RX ADMIN — CARBIDOPA AND LEVODOPA 1 TABLET(S): 25; 100 TABLET ORAL at 13:49

## 2024-03-01 RX ADMIN — SENNA PLUS 2 TABLET(S): 8.6 TABLET ORAL at 21:13

## 2024-03-01 RX ADMIN — Medication 0.25 MILLIGRAM(S): at 09:45

## 2024-03-01 RX ADMIN — CARBIDOPA AND LEVODOPA 1 TABLET(S): 25; 100 TABLET ORAL at 21:14

## 2024-03-01 RX ADMIN — Medication 0.25 MILLIGRAM(S): at 21:14

## 2024-03-01 RX ADMIN — QUETIAPINE FUMARATE 50 MILLIGRAM(S): 200 TABLET, FILM COATED ORAL at 13:52

## 2024-03-01 RX ADMIN — HALOPERIDOL DECANOATE 1 MILLIGRAM(S): 100 INJECTION INTRAMUSCULAR at 21:13

## 2024-03-01 RX ADMIN — DIVALPROEX SODIUM 500 MILLIGRAM(S): 500 TABLET, DELAYED RELEASE ORAL at 09:46

## 2024-03-01 RX ADMIN — METFORMIN HYDROCHLORIDE 500 MILLIGRAM(S): 850 TABLET ORAL at 21:14

## 2024-03-01 RX ADMIN — DIVALPROEX SODIUM 500 MILLIGRAM(S): 500 TABLET, DELAYED RELEASE ORAL at 13:48

## 2024-03-01 RX ADMIN — VALACYCLOVIR 1000 MILLIGRAM(S): 500 TABLET, FILM COATED ORAL at 21:14

## 2024-03-01 RX ADMIN — DIVALPROEX SODIUM 500 MILLIGRAM(S): 500 TABLET, DELAYED RELEASE ORAL at 21:15

## 2024-03-01 RX ADMIN — Medication 0.25 MILLIGRAM(S): at 13:49

## 2024-03-01 RX ADMIN — VALACYCLOVIR 1000 MILLIGRAM(S): 500 TABLET, FILM COATED ORAL at 09:45

## 2024-03-01 RX ADMIN — QUETIAPINE FUMARATE 150 MILLIGRAM(S): 200 TABLET, FILM COATED ORAL at 21:13

## 2024-03-01 RX ADMIN — Medication 1 TABLET(S): at 21:15

## 2024-03-01 RX ADMIN — METFORMIN HYDROCHLORIDE 500 MILLIGRAM(S): 850 TABLET ORAL at 09:45

## 2024-03-01 RX ADMIN — CARBIDOPA AND LEVODOPA 1 TABLET(S): 25; 100 TABLET ORAL at 09:44

## 2024-03-01 RX ADMIN — Medication 75 MICROGRAM(S): at 05:25

## 2024-03-01 RX ADMIN — Medication 1 APPLICATION(S): at 13:55

## 2024-03-01 RX ADMIN — LISINOPRIL 20 MILLIGRAM(S): 2.5 TABLET ORAL at 09:44

## 2024-03-01 NOTE — BH INPATIENT PSYCHIATRY PROGRESS NOTE - NSBHFUPINTERVALHXFT_PSY_A_CORE
Patient seen for schizoaffective disorder, chart reviewed and discussed with nursing staff.  Compliant with meds. Refused vitals  Reports sleep and appetite as very good. She remains talkative and intrusive at times but overall calmer and redirectable. Staff has impression she exaggerates tremor or anteroflexion when knows she is being observed

## 2024-03-01 NOTE — BH INPATIENT PSYCHIATRY PROGRESS NOTE - NSBHCHARTREVIEWVS_PSY_A_CORE FT
Vital Signs Last 24 Hrs  T(C): --  T(F): --  HR: --  BP: --  BP(mean): --  RR: --  SpO2: --    Orthostatic VS  02-29-24 @ 06:08  Lying BP: --/-- HR: --  Sitting BP: 130/75 HR: 86  Standing BP: 141/82 HR: 90  Site: --  Mode: --

## 2024-03-01 NOTE — BH INPATIENT PSYCHIATRY PROGRESS NOTE - MSE UNSTRUCTURED FT
Pt appears stated age, awake,  alert, poor relatedness Speech hard to understand due to poor articulation and edentulous at times sings her conversion. Has increased tremor but no evidence of rigidity, patient with anteroflexion-lessMood somewhat  irritable, affect labile though less severe No suicidal/homicidal ideas/plan expressed. Religiously and somatically focused with Hinduism delusions such as believing she is biblical figure some suspiciousness w/o well formed paranoid delusions Thinking disjointed. Cannot assess cognition. Poor insight and judgement, is impulsive

## 2024-03-01 NOTE — BH INPATIENT PSYCHIATRY PROGRESS NOTE - CURRENT MEDICATION
MEDICATIONS  (STANDING):  ammonium lactate 12% Lotion 1 Application(s) Topical two times a day  carbidopa/levodopa  25/100 1 Tablet(s) Oral three times a day  clonazePAM Oral Disintegrating Tablet 0.25 milliGRAM(s) Oral three times a day  dextrose 5%. 1000 milliLiter(s) (100 mL/Hr) IV Continuous <Continuous>  dextrose 5%. 1000 milliLiter(s) (50 mL/Hr) IV Continuous <Continuous>  dextrose 50% Injectable 25 Gram(s) IV Push once  dextrose 50% Injectable 25 Gram(s) IV Push once  dextrose 50% Injectable 12.5 Gram(s) IV Push once  divalproex Sprinkle 500 milliGRAM(s) Oral three times a day  glucagon  Injectable 1 milliGRAM(s) IntraMuscular once  haloperidol     Tablet 1 milliGRAM(s) Oral two times a day  hemorrhoidal Ointment 1 Application(s) Rectal daily  hydrocortisone 2.5% Ointment 1 Application(s) Topical two times a day  insulin lispro (ADMELOG) corrective regimen sliding scale   SubCutaneous three times a day before meals  levothyroxine 75 MICROGram(s) Oral daily  lisinopril 20 milliGRAM(s) Oral daily  metFORMIN 500 milliGRAM(s) Oral two times a day  metoprolol succinate ER 50 milliGRAM(s) Oral daily  multivitamin 1 Tablet(s) Oral at bedtime  polyethylene glycol 3350 17 Gram(s) Oral daily  QUEtiapine 50 milliGRAM(s) Oral <User Schedule>  QUEtiapine 150 milliGRAM(s) Oral at bedtime  senna 2 Tablet(s) Oral at bedtime  valACYclovir 1000 milliGRAM(s) Oral every 12 hours    MEDICATIONS  (PRN):  acetaminophen     Tablet .. 650 milliGRAM(s) Oral every 6 hours PRN Mild Pain (1 - 3)  acetaminophen     Tablet .. 650 milliGRAM(s) Oral every 6 hours PRN Mild Pain (1 - 3), Moderate Pain (4 - 6)  albuterol    90 MICROgram(s) HFA Inhaler 2 Puff(s) Inhalation every 6 hours PRN Shortness of Breath and/or Wheezing  aluminum hydroxide/magnesium hydroxide/simethicone Suspension 30 milliLiter(s) Oral every 6 hours PRN Dyspepsia  bisacodyl Suppository 10 milliGRAM(s) Rectal daily PRN constipation  dextrose Oral Gel 15 Gram(s) Oral once PRN Blood Glucose LESS THAN 70 milliGRAM(s)/deciliter  haloperidol    Injectable 2.5 milliGRAM(s) IntraMuscular once PRN Severe Agitation  QUEtiapine 50 milliGRAM(s) Oral every 6 hours PRN agitation  simethicone 80 milliGRAM(s) Chew every 8 hours PRN gas

## 2024-03-01 NOTE — BH INPATIENT PSYCHIATRY PROGRESS NOTE - NSBHMETABOLIC_PSY_ALL_CORE_FT
BMI: BMI (kg/m2): 30.2 (01-15-24 @ 12:52)  HbA1c: A1C with Estimated Average Glucose Result: 7.6 % (01-12-24 @ 12:40)    Glucose: POCT Blood Glucose.: 104 mg/dL (03-01-24 @ 12:16)    BP: 131/75 (02-28-24 @ 10:00) (131/75 - 131/75)Vital Signs Last 24 Hrs  T(C): --  T(F): --  HR: --  BP: --  BP(mean): --  RR: --  SpO2: --    Orthostatic VS  02-29-24 @ 06:08  Lying BP: --/-- HR: --  Sitting BP: 130/75 HR: 86  Standing BP: 141/82 HR: 90  Site: --  Mode: --    Lipid Panel:

## 2024-03-01 NOTE — BH INPATIENT PSYCHIATRY PROGRESS NOTE - NSBHASSESSSUMMFT_PSY_ALL_CORE
2/5 Patient showing some improving trend will give more time, if needs more medication titration due to ongoing symptoms will increase Seroquel not haldol  2/6 Improving cont meds, offered prn suppository and simethicone  2/7 Slowly improving will cont current meds, eval need for further increase Seroquel  2/8 Partial response continue meds except will increase Seroquel as was on 250mg/d PTA  2/9 Overall gains, still regresses and becomes difficult to manage will increase Seroquel to 200mg/d in divided dosing  2/12 A little calmer since started on additional mid day, cont other meds w/o change  2/13 Patient better still disorganized, hyper Roman Catholic but less agitated, cont current treatment  2/14 Improved globally with some symptom variability. Cont current med consider further increment Seroquel  2/15 Improved cont current regimen for now  2/16 Showing  some gains continue treatment for now at current doses   2/20 Improved, cont current rx. Scheduled urogyn for pessary replacement  2/21 Improved with variable intrusiveness. Cont meds will increase Miralax and Senna  2/22 Improving cont current rx, observe for effect of re-titration of laxatives  2/23 Lost balance mainly due to behavior however to be safe will lower haldol and Klonopin modestly and observe. Otherwise generally manageable  2/24: No PRNs needed overnight. Compliant with meds. As per staff last night pt was banging her Bible against the wall and lost her balance, staff held her. Pt remains talkative and intrusive at times but overall calmer and redirectable.   2/25: remains labile, loud, easily agitated but more redirectable.  2/26 Patient seen by medicine no evidence injury needing imaging, just Tylenol prn. Will cont current meds, if anteroflexion dosesn't improve with recent drop in haldol will consider further dose reduction  2/27 Has had slow increase in EPS despite no increase in dose and some reduction will hold haldol to reduce EPS and restart at lower dose  2/28 Patient with change in med tolerance some agitation and sedation. Will hold haldol tonight consider changin to Prolixin as may be a little better for EPS and will check labs to look for any medical issue  2/29 Labile irritable, increased tremor improved with washout of haldol. Will restart haldol but see if she can be stabilized at low dose to minimize tremor. COuld consider increasing Seroquel  3/1 Improved will see if she can stabilize on Seroquel and low dose haldol to minimize EPS will address nasal symptoms

## 2024-03-02 LAB
CULTURE RESULTS: SIGNIFICANT CHANGE UP
GLUCOSE BLDC GLUCOMTR-MCNC: 106 MG/DL — HIGH (ref 70–99)
GLUCOSE BLDC GLUCOMTR-MCNC: 108 MG/DL — HIGH (ref 70–99)
SPECIMEN SOURCE: SIGNIFICANT CHANGE UP

## 2024-03-02 PROCEDURE — 99232 SBSQ HOSP IP/OBS MODERATE 35: CPT

## 2024-03-02 RX ADMIN — HALOPERIDOL DECANOATE 1 MILLIGRAM(S): 100 INJECTION INTRAMUSCULAR at 09:02

## 2024-03-02 RX ADMIN — Medication 1 TABLET(S): at 21:31

## 2024-03-02 RX ADMIN — METFORMIN HYDROCHLORIDE 500 MILLIGRAM(S): 850 TABLET ORAL at 09:01

## 2024-03-02 RX ADMIN — CARBIDOPA AND LEVODOPA 1 TABLET(S): 25; 100 TABLET ORAL at 09:02

## 2024-03-02 RX ADMIN — DIVALPROEX SODIUM 500 MILLIGRAM(S): 500 TABLET, DELAYED RELEASE ORAL at 21:30

## 2024-03-02 RX ADMIN — SENNA PLUS 2 TABLET(S): 8.6 TABLET ORAL at 21:30

## 2024-03-02 RX ADMIN — METFORMIN HYDROCHLORIDE 500 MILLIGRAM(S): 850 TABLET ORAL at 21:32

## 2024-03-02 RX ADMIN — QUETIAPINE FUMARATE 50 MILLIGRAM(S): 200 TABLET, FILM COATED ORAL at 12:40

## 2024-03-02 RX ADMIN — DIVALPROEX SODIUM 500 MILLIGRAM(S): 500 TABLET, DELAYED RELEASE ORAL at 12:42

## 2024-03-02 RX ADMIN — VALACYCLOVIR 1000 MILLIGRAM(S): 500 TABLET, FILM COATED ORAL at 21:32

## 2024-03-02 RX ADMIN — Medication 0.25 MILLIGRAM(S): at 09:02

## 2024-03-02 RX ADMIN — CARBIDOPA AND LEVODOPA 1 TABLET(S): 25; 100 TABLET ORAL at 12:42

## 2024-03-02 RX ADMIN — DIVALPROEX SODIUM 500 MILLIGRAM(S): 500 TABLET, DELAYED RELEASE ORAL at 09:01

## 2024-03-02 RX ADMIN — POLYETHYLENE GLYCOL 3350 17 GRAM(S): 17 POWDER, FOR SOLUTION ORAL at 09:01

## 2024-03-02 RX ADMIN — CARBIDOPA AND LEVODOPA 1 TABLET(S): 25; 100 TABLET ORAL at 21:32

## 2024-03-02 RX ADMIN — Medication 75 MICROGRAM(S): at 06:21

## 2024-03-02 RX ADMIN — QUETIAPINE FUMARATE 150 MILLIGRAM(S): 200 TABLET, FILM COATED ORAL at 21:32

## 2024-03-02 RX ADMIN — Medication 50 MILLIGRAM(S): at 09:02

## 2024-03-02 RX ADMIN — LISINOPRIL 20 MILLIGRAM(S): 2.5 TABLET ORAL at 09:02

## 2024-03-02 RX ADMIN — VALACYCLOVIR 1000 MILLIGRAM(S): 500 TABLET, FILM COATED ORAL at 09:02

## 2024-03-02 RX ADMIN — Medication 0.25 MILLIGRAM(S): at 21:31

## 2024-03-02 RX ADMIN — Medication 0.25 MILLIGRAM(S): at 12:40

## 2024-03-02 RX ADMIN — HALOPERIDOL DECANOATE 1 MILLIGRAM(S): 100 INJECTION INTRAMUSCULAR at 21:33

## 2024-03-02 NOTE — BH INPATIENT PSYCHIATRY PROGRESS NOTE - MSE UNSTRUCTURED FT
Pt appears stated age, awake,  alert, poor relatedness Speech hard to understand due to poor articulation and edentulous at times sings her conversion. Has increased tremor but no evidence of rigidity, patient with anteroflexion- clearly lessMood somewhat  irritable, affect labile though less severe No suicidal/homicidal ideas/plan expressed. Religiously and somatically focused with Muslim delusions such as believing she is biblical figure some suspiciousness w/o well formed paranoid delusions Thinking disjointed. Cannot assess cognition. Poor insight and judgement, is impulsive

## 2024-03-02 NOTE — BH INPATIENT PSYCHIATRY PROGRESS NOTE - PRN MEDS
MEDICATIONS  (PRN):  acetaminophen     Tablet .. 650 milliGRAM(s) Oral every 6 hours PRN Mild Pain (1 - 3)  acetaminophen     Tablet .. 650 milliGRAM(s) Oral every 6 hours PRN Mild Pain (1 - 3), Moderate Pain (4 - 6)  albuterol    90 MICROgram(s) HFA Inhaler 2 Puff(s) Inhalation every 6 hours PRN Shortness of Breath and/or Wheezing  aluminum hydroxide/magnesium hydroxide/simethicone Suspension 30 milliLiter(s) Oral every 6 hours PRN Dyspepsia  bisacodyl Suppository 10 milliGRAM(s) Rectal daily PRN constipation  dextrose Oral Gel 15 Gram(s) Oral once PRN Blood Glucose LESS THAN 70 milliGRAM(s)/deciliter  haloperidol    Injectable 2.5 milliGRAM(s) IntraMuscular once PRN Severe Agitation  QUEtiapine 50 milliGRAM(s) Oral every 6 hours PRN agitation  simethicone 80 milliGRAM(s) Chew every 8 hours PRN gas  sodium chloride 0.65% Nasal 2 Spray(s) Both Nostrils every 6 hours PRN congestion

## 2024-03-02 NOTE — BH INPATIENT PSYCHIATRY PROGRESS NOTE - CURRENT MEDICATION
MEDICATIONS  (STANDING):  ammonium lactate 12% Lotion 1 Application(s) Topical two times a day  carbidopa/levodopa  25/100 1 Tablet(s) Oral three times a day  clonazePAM Oral Disintegrating Tablet 0.25 milliGRAM(s) Oral three times a day  dextrose 5%. 1000 milliLiter(s) (100 mL/Hr) IV Continuous <Continuous>  dextrose 5%. 1000 milliLiter(s) (50 mL/Hr) IV Continuous <Continuous>  dextrose 50% Injectable 25 Gram(s) IV Push once  dextrose 50% Injectable 12.5 Gram(s) IV Push once  dextrose 50% Injectable 25 Gram(s) IV Push once  divalproex Sprinkle 500 milliGRAM(s) Oral three times a day  glucagon  Injectable 1 milliGRAM(s) IntraMuscular once  haloperidol     Tablet 1 milliGRAM(s) Oral two times a day  hemorrhoidal Ointment 1 Application(s) Rectal daily  hydrocortisone 2.5% Ointment 1 Application(s) Topical two times a day  insulin lispro (ADMELOG) corrective regimen sliding scale   SubCutaneous three times a day before meals  levothyroxine 75 MICROGram(s) Oral daily  lisinopril 20 milliGRAM(s) Oral daily  metFORMIN 500 milliGRAM(s) Oral two times a day  metoprolol succinate ER 50 milliGRAM(s) Oral daily  multivitamin 1 Tablet(s) Oral at bedtime  polyethylene glycol 3350 17 Gram(s) Oral daily  QUEtiapine 50 milliGRAM(s) Oral <User Schedule>  QUEtiapine 150 milliGRAM(s) Oral at bedtime  senna 2 Tablet(s) Oral at bedtime  valACYclovir 1000 milliGRAM(s) Oral every 12 hours    MEDICATIONS  (PRN):  acetaminophen     Tablet .. 650 milliGRAM(s) Oral every 6 hours PRN Mild Pain (1 - 3)  acetaminophen     Tablet .. 650 milliGRAM(s) Oral every 6 hours PRN Mild Pain (1 - 3), Moderate Pain (4 - 6)  albuterol    90 MICROgram(s) HFA Inhaler 2 Puff(s) Inhalation every 6 hours PRN Shortness of Breath and/or Wheezing  aluminum hydroxide/magnesium hydroxide/simethicone Suspension 30 milliLiter(s) Oral every 6 hours PRN Dyspepsia  bisacodyl Suppository 10 milliGRAM(s) Rectal daily PRN constipation  dextrose Oral Gel 15 Gram(s) Oral once PRN Blood Glucose LESS THAN 70 milliGRAM(s)/deciliter  haloperidol    Injectable 2.5 milliGRAM(s) IntraMuscular once PRN Severe Agitation  QUEtiapine 50 milliGRAM(s) Oral every 6 hours PRN agitation  simethicone 80 milliGRAM(s) Chew every 8 hours PRN gas  sodium chloride 0.65% Nasal 2 Spray(s) Both Nostrils every 6 hours PRN congestion

## 2024-03-02 NOTE — BH INPATIENT PSYCHIATRY PROGRESS NOTE - NSBHASSESSSUMMFT_PSY_ALL_CORE
2/5 Patient showing some improving trend will give more time, if needs more medication titration due to ongoing symptoms will increase Seroquel not haldol  2/6 Improving cont meds, offered prn suppository and simethicone  2/7 Slowly improving will cont current meds, eval need for further increase Seroquel  2/8 Partial response continue meds except will increase Seroquel as was on 250mg/d PTA  2/9 Overall gains, still regresses and becomes difficult to manage will increase Seroquel to 200mg/d in divided dosing  2/12 A little calmer since started on additional mid day, cont other meds w/o change  2/13 Patient better still disorganized, hyper Confucianist but less agitated, cont current treatment  2/14 Improved globally with some symptom variability. Cont current med consider further increment Seroquel  2/15 Improved cont current regimen for now  2/16 Showing  some gains continue treatment for now at current doses   2/20 Improved, cont current rx. Scheduled urogyn for pessary replacement  2/21 Improved with variable intrusiveness. Cont meds will increase Miralax and Senna  2/22 Improving cont current rx, observe for effect of re-titration of laxatives  2/23 Lost balance mainly due to behavior however to be safe will lower haldol and Klonopin modestly and observe. Otherwise generally manageable  2/24: No PRNs needed overnight. Compliant with meds. As per staff last night pt was banging her Bible against the wall and lost her balance, staff held her. Pt remains talkative and intrusive at times but overall calmer and redirectable.   2/25: remains labile, loud, easily agitated but more redirectable.  2/26 Patient seen by medicine no evidence injury needing imaging, just Tylenol prn. Will cont current meds, if anteroflexion dosesn't improve with recent drop in haldol will consider further dose reduction  2/27 Has had slow increase in EPS despite no increase in dose and some reduction will hold haldol to reduce EPS and restart at lower dose  2/28 Patient with change in med tolerance some agitation and sedation. Will hold haldol tonight consider changin to Prolixin as may be a little better for EPS and will check labs to look for any medical issue  2/29 Labile irritable, increased tremor improved with washout of haldol. Will restart haldol but see if she can be stabilized at low dose to minimize tremor. COuld consider increasing Seroquel  3/1 Improved will see if she can stabilize on Seroquel and low dose haldol to minimize EPS will address nasal symptoms  3/2 Patient better less bent tremor less calmer. Attempt to fond dose of haldol that control psychosis agitation with manageable tremor. May need to try 2mg bid as 1mg big may be subtherapeutic

## 2024-03-02 NOTE — BH INPATIENT PSYCHIATRY PROGRESS NOTE - NSBHMETABOLIC_PSY_ALL_CORE_FT
BMI: BMI (kg/m2): 30.2 (01-15-24 @ 12:52)  HbA1c: A1C with Estimated Average Glucose Result: 7.6 % (01-12-24 @ 12:40)    Glucose: POCT Blood Glucose.: 106 mg/dL (03-02-24 @ 08:17)    BP: --Vital Signs Last 24 Hrs  T(C): --  T(F): --  HR: --  BP: --  BP(mean): --  RR: --  SpO2: --      Lipid Panel:

## 2024-03-02 NOTE — BH INPATIENT PSYCHIATRY PROGRESS NOTE - NSBHFUPINTERVALHXFT_PSY_A_CORE
Patient seen for schizoaffective disorder, chart reviewed and discussed with nursing staff.  Compliant with meds. Refused vitals  Reports sleep and appetite as  good. She remains talkative and intrusive at times but overall calmer and redirectable. Staff has impression she exaggerates tremor or anteroflexion when knows she is being observed

## 2024-03-03 LAB
GLUCOSE BLDC GLUCOMTR-MCNC: 126 MG/DL — HIGH (ref 70–99)
GLUCOSE BLDC GLUCOMTR-MCNC: 170 MG/DL — HIGH (ref 70–99)
GLUCOSE BLDC GLUCOMTR-MCNC: 90 MG/DL — SIGNIFICANT CHANGE UP (ref 70–99)

## 2024-03-03 RX ADMIN — METFORMIN HYDROCHLORIDE 500 MILLIGRAM(S): 850 TABLET ORAL at 21:18

## 2024-03-03 RX ADMIN — VALACYCLOVIR 1000 MILLIGRAM(S): 500 TABLET, FILM COATED ORAL at 08:16

## 2024-03-03 RX ADMIN — CARBIDOPA AND LEVODOPA 1 TABLET(S): 25; 100 TABLET ORAL at 08:16

## 2024-03-03 RX ADMIN — CARBIDOPA AND LEVODOPA 1 TABLET(S): 25; 100 TABLET ORAL at 12:48

## 2024-03-03 RX ADMIN — METFORMIN HYDROCHLORIDE 500 MILLIGRAM(S): 850 TABLET ORAL at 08:16

## 2024-03-03 RX ADMIN — HALOPERIDOL DECANOATE 1 MILLIGRAM(S): 100 INJECTION INTRAMUSCULAR at 08:16

## 2024-03-03 RX ADMIN — SENNA PLUS 2 TABLET(S): 8.6 TABLET ORAL at 21:18

## 2024-03-03 RX ADMIN — DIVALPROEX SODIUM 500 MILLIGRAM(S): 500 TABLET, DELAYED RELEASE ORAL at 12:48

## 2024-03-03 RX ADMIN — QUETIAPINE FUMARATE 50 MILLIGRAM(S): 200 TABLET, FILM COATED ORAL at 09:39

## 2024-03-03 RX ADMIN — DIVALPROEX SODIUM 500 MILLIGRAM(S): 500 TABLET, DELAYED RELEASE ORAL at 08:16

## 2024-03-03 RX ADMIN — Medication 1 TABLET(S): at 21:18

## 2024-03-03 RX ADMIN — QUETIAPINE FUMARATE 150 MILLIGRAM(S): 200 TABLET, FILM COATED ORAL at 21:18

## 2024-03-03 RX ADMIN — Medication 75 MICROGRAM(S): at 06:30

## 2024-03-03 RX ADMIN — Medication 0.25 MILLIGRAM(S): at 08:16

## 2024-03-03 RX ADMIN — HALOPERIDOL DECANOATE 1 MILLIGRAM(S): 100 INJECTION INTRAMUSCULAR at 21:18

## 2024-03-03 RX ADMIN — Medication 50 MILLIGRAM(S): at 08:16

## 2024-03-03 RX ADMIN — DIVALPROEX SODIUM 500 MILLIGRAM(S): 500 TABLET, DELAYED RELEASE ORAL at 21:19

## 2024-03-03 RX ADMIN — QUETIAPINE FUMARATE 50 MILLIGRAM(S): 200 TABLET, FILM COATED ORAL at 12:48

## 2024-03-03 RX ADMIN — VALACYCLOVIR 1000 MILLIGRAM(S): 500 TABLET, FILM COATED ORAL at 21:19

## 2024-03-03 RX ADMIN — CARBIDOPA AND LEVODOPA 1 TABLET(S): 25; 100 TABLET ORAL at 21:18

## 2024-03-03 RX ADMIN — LISINOPRIL 20 MILLIGRAM(S): 2.5 TABLET ORAL at 08:16

## 2024-03-03 RX ADMIN — Medication 0.25 MILLIGRAM(S): at 12:48

## 2024-03-03 RX ADMIN — Medication 0.25 MILLIGRAM(S): at 21:18

## 2024-03-04 LAB
GLUCOSE BLDC GLUCOMTR-MCNC: 121 MG/DL — HIGH (ref 70–99)
GLUCOSE BLDC GLUCOMTR-MCNC: 96 MG/DL — SIGNIFICANT CHANGE UP (ref 70–99)

## 2024-03-04 PROCEDURE — 99232 SBSQ HOSP IP/OBS MODERATE 35: CPT

## 2024-03-04 RX ADMIN — QUETIAPINE FUMARATE 150 MILLIGRAM(S): 200 TABLET, FILM COATED ORAL at 21:38

## 2024-03-04 RX ADMIN — HALOPERIDOL DECANOATE 1 MILLIGRAM(S): 100 INJECTION INTRAMUSCULAR at 08:09

## 2024-03-04 RX ADMIN — VALACYCLOVIR 1000 MILLIGRAM(S): 500 TABLET, FILM COATED ORAL at 21:34

## 2024-03-04 RX ADMIN — HALOPERIDOL DECANOATE 1 MILLIGRAM(S): 100 INJECTION INTRAMUSCULAR at 21:33

## 2024-03-04 RX ADMIN — METFORMIN HYDROCHLORIDE 500 MILLIGRAM(S): 850 TABLET ORAL at 21:36

## 2024-03-04 RX ADMIN — Medication 50 MILLIGRAM(S): at 08:13

## 2024-03-04 RX ADMIN — CARBIDOPA AND LEVODOPA 1 TABLET(S): 25; 100 TABLET ORAL at 08:12

## 2024-03-04 RX ADMIN — CARBIDOPA AND LEVODOPA 1 TABLET(S): 25; 100 TABLET ORAL at 12:24

## 2024-03-04 RX ADMIN — Medication 1 APPLICATION(S): at 22:12

## 2024-03-04 RX ADMIN — Medication 75 MICROGRAM(S): at 06:36

## 2024-03-04 RX ADMIN — Medication 0.25 MILLIGRAM(S): at 21:35

## 2024-03-04 RX ADMIN — LISINOPRIL 20 MILLIGRAM(S): 2.5 TABLET ORAL at 08:13

## 2024-03-04 RX ADMIN — QUETIAPINE FUMARATE 50 MILLIGRAM(S): 200 TABLET, FILM COATED ORAL at 08:13

## 2024-03-04 RX ADMIN — Medication 1 APPLICATION(S): at 09:59

## 2024-03-04 RX ADMIN — Medication 0.25 MILLIGRAM(S): at 12:24

## 2024-03-04 RX ADMIN — CARBIDOPA AND LEVODOPA 1 TABLET(S): 25; 100 TABLET ORAL at 21:36

## 2024-03-04 RX ADMIN — QUETIAPINE FUMARATE 50 MILLIGRAM(S): 200 TABLET, FILM COATED ORAL at 12:24

## 2024-03-04 RX ADMIN — Medication 0.25 MILLIGRAM(S): at 08:13

## 2024-03-04 RX ADMIN — VALACYCLOVIR 1000 MILLIGRAM(S): 500 TABLET, FILM COATED ORAL at 08:12

## 2024-03-04 RX ADMIN — DIVALPROEX SODIUM 500 MILLIGRAM(S): 500 TABLET, DELAYED RELEASE ORAL at 12:24

## 2024-03-04 RX ADMIN — Medication 1 TABLET(S): at 21:35

## 2024-03-04 RX ADMIN — DIVALPROEX SODIUM 500 MILLIGRAM(S): 500 TABLET, DELAYED RELEASE ORAL at 08:13

## 2024-03-04 RX ADMIN — SENNA PLUS 2 TABLET(S): 8.6 TABLET ORAL at 21:37

## 2024-03-04 RX ADMIN — METFORMIN HYDROCHLORIDE 500 MILLIGRAM(S): 850 TABLET ORAL at 08:12

## 2024-03-04 RX ADMIN — DIVALPROEX SODIUM 500 MILLIGRAM(S): 500 TABLET, DELAYED RELEASE ORAL at 21:34

## 2024-03-04 NOTE — BH INPATIENT PSYCHIATRY PROGRESS NOTE - NSBHMETABOLIC_PSY_ALL_CORE_FT
BMI: BMI (kg/m2): 30.2 (01-15-24 @ 12:52)  HbA1c: A1C with Estimated Average Glucose Result: 7.6 % (01-12-24 @ 12:40)    Glucose: POCT Blood Glucose.: 96 mg/dL (03-04-24 @ 12:15)    BP: --Vital Signs Last 24 Hrs  T(C): 36.8 (03-04-24 @ 08:44), Max: 36.8 (03-04-24 @ 08:44)  T(F): 98.2 (03-04-24 @ 08:44), Max: 98.2 (03-04-24 @ 08:44)  HR: --  BP: --  BP(mean): --  RR: --  SpO2: --    Orthostatic VS  03-04-24 @ 08:44  Lying BP: --/-- HR: --  Sitting BP: 136/74 HR: 75  Standing BP: 141/63 HR: 84  Site: upper right arm  Mode: electronic  Orthostatic VS  03-03-24 @ 07:46  Lying BP: --/-- HR: --  Sitting BP: 116/65 HR: 80  Standing BP: 112/88 HR: 90  Site: --  Mode: --    Lipid Panel:

## 2024-03-04 NOTE — BH INPATIENT PSYCHIATRY PROGRESS NOTE - MSE UNSTRUCTURED FT
Pt appears stated age, awake,  alert, poor relatedness Speech hard to understand due to poor articulation and edentulous at times sings her conversion. Has increased tremor but no evidence of rigidity, patient with anteroflexion- clearly lessMood somewhat  irritable, affect labile though less severe No suicidal/homicidal ideas/plan expressed. Religiously and somatically focused with Presybeterian delusions such as believing she is biblical figure some suspiciousness w/o well formed paranoid delusions Thinking disjointed. Cannot assess cognition. Poor insight and judgement, is impulsive

## 2024-03-04 NOTE — BH INPATIENT PSYCHIATRY PROGRESS NOTE - CURRENT MEDICATION
MEDICATIONS  (STANDING):  ammonium lactate 12% Lotion 1 Application(s) Topical two times a day  carbidopa/levodopa  25/100 1 Tablet(s) Oral three times a day  clonazePAM Oral Disintegrating Tablet 0.25 milliGRAM(s) Oral three times a day  dextrose 5%. 1000 milliLiter(s) (50 mL/Hr) IV Continuous <Continuous>  dextrose 5%. 1000 milliLiter(s) (100 mL/Hr) IV Continuous <Continuous>  dextrose 50% Injectable 25 Gram(s) IV Push once  dextrose 50% Injectable 25 Gram(s) IV Push once  dextrose 50% Injectable 12.5 Gram(s) IV Push once  divalproex Sprinkle 500 milliGRAM(s) Oral three times a day  glucagon  Injectable 1 milliGRAM(s) IntraMuscular once  haloperidol     Tablet 1 milliGRAM(s) Oral two times a day  hemorrhoidal Ointment 1 Application(s) Rectal daily  hydrocortisone 2.5% Ointment 1 Application(s) Topical two times a day  insulin lispro (ADMELOG) corrective regimen sliding scale   SubCutaneous three times a day before meals  levothyroxine 75 MICROGram(s) Oral daily  lisinopril 20 milliGRAM(s) Oral daily  metFORMIN 500 milliGRAM(s) Oral two times a day  metoprolol succinate ER 50 milliGRAM(s) Oral daily  multivitamin 1 Tablet(s) Oral at bedtime  polyethylene glycol 3350 17 Gram(s) Oral daily  QUEtiapine 50 milliGRAM(s) Oral <User Schedule>  QUEtiapine 150 milliGRAM(s) Oral at bedtime  senna 2 Tablet(s) Oral at bedtime  valACYclovir 1000 milliGRAM(s) Oral every 12 hours    MEDICATIONS  (PRN):  acetaminophen     Tablet .. 650 milliGRAM(s) Oral every 6 hours PRN Mild Pain (1 - 3)  acetaminophen     Tablet .. 650 milliGRAM(s) Oral every 6 hours PRN Mild Pain (1 - 3), Moderate Pain (4 - 6)  albuterol    90 MICROgram(s) HFA Inhaler 2 Puff(s) Inhalation every 6 hours PRN Shortness of Breath and/or Wheezing  aluminum hydroxide/magnesium hydroxide/simethicone Suspension 30 milliLiter(s) Oral every 6 hours PRN Dyspepsia  bisacodyl Suppository 10 milliGRAM(s) Rectal daily PRN constipation  dextrose Oral Gel 15 Gram(s) Oral once PRN Blood Glucose LESS THAN 70 milliGRAM(s)/deciliter  haloperidol    Injectable 2.5 milliGRAM(s) IntraMuscular once PRN Severe Agitation  QUEtiapine 50 milliGRAM(s) Oral every 6 hours PRN agitation  simethicone 80 milliGRAM(s) Chew every 8 hours PRN gas  sodium chloride 0.65% Nasal 2 Spray(s) Both Nostrils every 6 hours PRN congestion

## 2024-03-04 NOTE — ED ADULT NURSE NOTE - NSICDXPASTMEDICALHX_GEN_ALL_CORE_FT
AUTHORIZATION FOR RELEASE OF   CONFIDENTIAL INFORMATION    Dear Dr. Carrera,    Fax:  [phone]    We are seeing Herminia Negron, date of birth 1964, in the clinic at [facility]. Koby Silverman MD is the patient's PCP. Herminia Negron has an outstanding lab/procedure at the time we reviewed her chart. In order to help keep her health information updated, she has authorized us to request the following medical record(s):        (x  )  MAMMOGRAM                                      (  )  COLONOSCOPY      (  )  PAP SMEAR                                          (  )  OUTSIDE LAB RESULTS     (  )  DEXA SCAN                                          (  )  EYE EXAM            (  )  FOOT EXAM                                          (  )  ENTIRE RECORD     (  )  OUTSIDE IMMUNIZATIONS                 (  )  _______________         Please fax records to [facility], [place], Christopher J., MD, [phone].            Patient Name: Herminia Negron  : 1964  Patient Phone #: [phone]      PAST MEDICAL HISTORY:  Hemorrhoids     Hyperlipidemia     Hypertension     Hypothyroidism     Parkinson disease     Schizophrenia     Seizure disorder     Type II diabetes mellitus

## 2024-03-04 NOTE — BH INPATIENT PSYCHIATRY PROGRESS NOTE - NSBHCHARTREVIEWVS_PSY_A_CORE FT
Vital Signs Last 24 Hrs  T(C): 36.8 (03-04-24 @ 08:44), Max: 36.8 (03-04-24 @ 08:44)  T(F): 98.2 (03-04-24 @ 08:44), Max: 98.2 (03-04-24 @ 08:44)  HR: --  BP: --  BP(mean): --  RR: --  SpO2: --    Orthostatic VS  03-04-24 @ 08:44  Lying BP: --/-- HR: --  Sitting BP: 136/74 HR: 75  Standing BP: 141/63 HR: 84  Site: upper right arm  Mode: electronic  Orthostatic VS  03-03-24 @ 07:46  Lying BP: --/-- HR: --  Sitting BP: 116/65 HR: 80  Standing BP: 112/88 HR: 90  Site: --  Mode: --

## 2024-03-04 NOTE — BH INPATIENT PSYCHIATRY PROGRESS NOTE - NSBHASSESSSUMMFT_PSY_ALL_CORE
2/5 Patient showing some improving trend will give more time, if needs more medication titration due to ongoing symptoms will increase Seroquel not haldol  2/6 Improving cont meds, offered prn suppository and simethicone  2/7 Slowly improving will cont current meds, eval need for further increase Seroquel  2/8 Partial response continue meds except will increase Seroquel as was on 250mg/d PTA  2/9 Overall gains, still regresses and becomes difficult to manage will increase Seroquel to 200mg/d in divided dosing  2/12 A little calmer since started on additional mid day, cont other meds w/o change  2/13 Patient better still disorganized, hyper Latter day but less agitated, cont current treatment  2/14 Improved globally with some symptom variability. Cont current med consider further increment Seroquel  2/15 Improved cont current regimen for now  2/16 Showing  some gains continue treatment for now at current doses   2/20 Improved, cont current rx. Scheduled urogyn for pessary replacement  2/21 Improved with variable intrusiveness. Cont meds will increase Miralax and Senna  2/22 Improving cont current rx, observe for effect of re-titration of laxatives  2/23 Lost balance mainly due to behavior however to be safe will lower haldol and Klonopin modestly and observe. Otherwise generally manageable  2/24: No PRNs needed overnight. Compliant with meds. As per staff last night pt was banging her Bible against the wall and lost her balance, staff held her. Pt remains talkative and intrusive at times but overall calmer and redirectable.   2/25: remains labile, loud, easily agitated but more redirectable.  2/26 Patient seen by medicine no evidence injury needing imaging, just Tylenol prn. Will cont current meds, if anteroflexion dosesn't improve with recent drop in haldol will consider further dose reduction  2/27 Has had slow increase in EPS despite no increase in dose and some reduction will hold haldol to reduce EPS and restart at lower dose  2/28 Patient with change in med tolerance some agitation and sedation. Will hold haldol tonight consider changin to Prolixin as may be a little better for EPS and will check labs to look for any medical issue  2/29 Labile irritable, increased tremor improved with washout of haldol. Will restart haldol but see if she can be stabilized at low dose to minimize tremor. COuld consider increasing Seroquel  3/1 Improved will see if she can stabilize on Seroquel and low dose haldol to minimize EPS will address nasal symptoms  3/2 Patient better less bent tremor less calmer. Attempt to fond dose of haldol that control psychosis agitation with manageable tremor. May need to try 2mg bid as 1mg big may be subtherapeutic  3/3 Some improvement in that less agitated but EPS better will cont with low dose haldol Issues is that both side effects and decompenstion have had long lags realted to dose changes so unclear if at haldol 1mg bid she is better EPS wise but will gradually decompensate. Cont to observe consider increase to 1mg tid. Patient's 2PC expiring she remains delusional disorganized not yet able to managed in SNF will apply for further retention

## 2024-03-04 NOTE — BH INPATIENT PSYCHIATRY PROGRESS NOTE - NSBHFUPINTERVALHXFT_PSY_A_CORE
Patient seen for schizoaffective disorder, chart reviewed and discussed with nursing staff.  Compliant with meds. VSS Reports sleep and appetite as  good. She remains talkative and intrusive at times but overall calmer and redirectable. Staff has impression she exaggerates tremor or anteroflexion when knows she is being observed

## 2024-03-05 LAB
GLUCOSE BLDC GLUCOMTR-MCNC: 106 MG/DL — HIGH (ref 70–99)
GLUCOSE BLDC GLUCOMTR-MCNC: 117 MG/DL — HIGH (ref 70–99)
GLUCOSE BLDC GLUCOMTR-MCNC: 174 MG/DL — HIGH (ref 70–99)

## 2024-03-05 RX ORDER — CLONAZEPAM 1 MG
0.25 TABLET ORAL
Refills: 0 | Status: DISCONTINUED | OUTPATIENT
Start: 2024-03-05 | End: 2024-03-12

## 2024-03-05 RX ADMIN — CARBIDOPA AND LEVODOPA 1 TABLET(S): 25; 100 TABLET ORAL at 13:37

## 2024-03-05 RX ADMIN — CARBIDOPA AND LEVODOPA 1 TABLET(S): 25; 100 TABLET ORAL at 08:27

## 2024-03-05 RX ADMIN — QUETIAPINE FUMARATE 50 MILLIGRAM(S): 200 TABLET, FILM COATED ORAL at 13:37

## 2024-03-05 RX ADMIN — Medication 75 MICROGRAM(S): at 07:48

## 2024-03-05 RX ADMIN — HALOPERIDOL DECANOATE 1 MILLIGRAM(S): 100 INJECTION INTRAMUSCULAR at 08:28

## 2024-03-05 RX ADMIN — Medication 50 MILLIGRAM(S): at 08:28

## 2024-03-05 RX ADMIN — CARBIDOPA AND LEVODOPA 1 TABLET(S): 25; 100 TABLET ORAL at 21:41

## 2024-03-05 RX ADMIN — SENNA PLUS 2 TABLET(S): 8.6 TABLET ORAL at 21:42

## 2024-03-05 RX ADMIN — LISINOPRIL 20 MILLIGRAM(S): 2.5 TABLET ORAL at 08:28

## 2024-03-05 RX ADMIN — METFORMIN HYDROCHLORIDE 500 MILLIGRAM(S): 850 TABLET ORAL at 21:41

## 2024-03-05 RX ADMIN — VALACYCLOVIR 1000 MILLIGRAM(S): 500 TABLET, FILM COATED ORAL at 21:42

## 2024-03-05 RX ADMIN — DIVALPROEX SODIUM 500 MILLIGRAM(S): 500 TABLET, DELAYED RELEASE ORAL at 21:42

## 2024-03-05 RX ADMIN — VALACYCLOVIR 1000 MILLIGRAM(S): 500 TABLET, FILM COATED ORAL at 08:27

## 2024-03-05 RX ADMIN — Medication 0.25 MILLIGRAM(S): at 08:28

## 2024-03-05 RX ADMIN — DIVALPROEX SODIUM 500 MILLIGRAM(S): 500 TABLET, DELAYED RELEASE ORAL at 13:37

## 2024-03-05 RX ADMIN — DIVALPROEX SODIUM 500 MILLIGRAM(S): 500 TABLET, DELAYED RELEASE ORAL at 08:28

## 2024-03-05 RX ADMIN — Medication 0.25 MILLIGRAM(S): at 21:42

## 2024-03-05 RX ADMIN — METFORMIN HYDROCHLORIDE 500 MILLIGRAM(S): 850 TABLET ORAL at 08:27

## 2024-03-05 RX ADMIN — Medication 1 TABLET(S): at 21:42

## 2024-03-05 RX ADMIN — HALOPERIDOL DECANOATE 1 MILLIGRAM(S): 100 INJECTION INTRAMUSCULAR at 21:42

## 2024-03-05 RX ADMIN — QUETIAPINE FUMARATE 150 MILLIGRAM(S): 200 TABLET, FILM COATED ORAL at 21:42

## 2024-03-05 RX ADMIN — QUETIAPINE FUMARATE 50 MILLIGRAM(S): 200 TABLET, FILM COATED ORAL at 08:29

## 2024-03-05 NOTE — BH INPATIENT PSYCHIATRY PROGRESS NOTE - NSBHCHARTREVIEWVS_PSY_A_CORE FT
Vital Signs Last 24 Hrs  T(C): 36.6 (03-05-24 @ 08:10), Max: 36.6 (03-05-24 @ 08:10)  T(F): 97.8 (03-05-24 @ 08:10), Max: 97.8 (03-05-24 @ 08:10)  HR: --  BP: --  BP(mean): --  RR: --  SpO2: --    Orthostatic VS  03-05-24 @ 08:10  Lying BP: --/-- HR: --  Sitting BP: 132/71 HR: 78  Standing BP: 139/88 HR: 81  Site: --  Mode: --  Orthostatic VS  03-04-24 @ 08:44  Lying BP: --/-- HR: --  Sitting BP: 136/74 HR: 75  Standing BP: 141/63 HR: 84  Site: upper right arm  Mode: electronic

## 2024-03-05 NOTE — BH INPATIENT PSYCHIATRY PROGRESS NOTE - CURRENT MEDICATION
MEDICATIONS  (STANDING):  ammonium lactate 12% Lotion 1 Application(s) Topical two times a day  carbidopa/levodopa  25/100 1 Tablet(s) Oral three times a day  clonazePAM Oral Disintegrating Tablet 0.25 milliGRAM(s) Oral two times a day  dextrose 5%. 1000 milliLiter(s) (50 mL/Hr) IV Continuous <Continuous>  dextrose 5%. 1000 milliLiter(s) (100 mL/Hr) IV Continuous <Continuous>  dextrose 50% Injectable 25 Gram(s) IV Push once  dextrose 50% Injectable 25 Gram(s) IV Push once  dextrose 50% Injectable 12.5 Gram(s) IV Push once  divalproex Sprinkle 500 milliGRAM(s) Oral three times a day  glucagon  Injectable 1 milliGRAM(s) IntraMuscular once  haloperidol     Tablet 1 milliGRAM(s) Oral two times a day  hemorrhoidal Ointment 1 Application(s) Rectal daily  hydrocortisone 2.5% Ointment 1 Application(s) Topical two times a day  insulin lispro (ADMELOG) corrective regimen sliding scale   SubCutaneous three times a day before meals  levothyroxine 75 MICROGram(s) Oral daily  lisinopril 20 milliGRAM(s) Oral daily  metFORMIN 500 milliGRAM(s) Oral two times a day  metoprolol succinate ER 50 milliGRAM(s) Oral daily  multivitamin 1 Tablet(s) Oral at bedtime  polyethylene glycol 3350 17 Gram(s) Oral daily  QUEtiapine 50 milliGRAM(s) Oral <User Schedule>  QUEtiapine 150 milliGRAM(s) Oral at bedtime  senna 2 Tablet(s) Oral at bedtime  valACYclovir 1000 milliGRAM(s) Oral every 12 hours    MEDICATIONS  (PRN):  acetaminophen     Tablet .. 650 milliGRAM(s) Oral every 6 hours PRN Mild Pain (1 - 3)  acetaminophen     Tablet .. 650 milliGRAM(s) Oral every 6 hours PRN Mild Pain (1 - 3), Moderate Pain (4 - 6)  albuterol    90 MICROgram(s) HFA Inhaler 2 Puff(s) Inhalation every 6 hours PRN Shortness of Breath and/or Wheezing  aluminum hydroxide/magnesium hydroxide/simethicone Suspension 30 milliLiter(s) Oral every 6 hours PRN Dyspepsia  bisacodyl Suppository 10 milliGRAM(s) Rectal daily PRN constipation  dextrose Oral Gel 15 Gram(s) Oral once PRN Blood Glucose LESS THAN 70 milliGRAM(s)/deciliter  haloperidol    Injectable 2.5 milliGRAM(s) IntraMuscular once PRN Severe Agitation  QUEtiapine 50 milliGRAM(s) Oral every 6 hours PRN agitation  simethicone 80 milliGRAM(s) Chew every 8 hours PRN gas  sodium chloride 0.65% Nasal 2 Spray(s) Both Nostrils every 6 hours PRN congestion   MEDICATIONS  (STANDING):  ammonium lactate 12% Lotion 1 Application(s) Topical two times a day  carbidopa/levodopa  25/100 1 Tablet(s) Oral three times a day  clonazePAM Oral Disintegrating Tablet 0.25 milliGRAM(s) Oral two times a day  dextrose 5%. 1000 milliLiter(s) (50 mL/Hr) IV Continuous <Continuous>  dextrose 5%. 1000 milliLiter(s) (100 mL/Hr) IV Continuous <Continuous>  dextrose 50% Injectable 25 Gram(s) IV Push once  dextrose 50% Injectable 12.5 Gram(s) IV Push once  dextrose 50% Injectable 25 Gram(s) IV Push once  divalproex Sprinkle 500 milliGRAM(s) Oral three times a day  glucagon  Injectable 1 milliGRAM(s) IntraMuscular once  haloperidol     Tablet 1 milliGRAM(s) Oral two times a day  hemorrhoidal Ointment 1 Application(s) Rectal daily  hydrocortisone 2.5% Ointment 1 Application(s) Topical two times a day  insulin lispro (ADMELOG) corrective regimen sliding scale   SubCutaneous three times a day before meals  levothyroxine 75 MICROGram(s) Oral daily  lisinopril 20 milliGRAM(s) Oral daily  metFORMIN 500 milliGRAM(s) Oral two times a day  metoprolol succinate ER 50 milliGRAM(s) Oral daily  multivitamin 1 Tablet(s) Oral at bedtime  polyethylene glycol 3350 17 Gram(s) Oral daily  QUEtiapine 50 milliGRAM(s) Oral <User Schedule>  QUEtiapine 150 milliGRAM(s) Oral at bedtime  senna 2 Tablet(s) Oral at bedtime  valACYclovir 1000 milliGRAM(s) Oral every 12 hours    MEDICATIONS  (PRN):  acetaminophen     Tablet .. 650 milliGRAM(s) Oral every 6 hours PRN Mild Pain (1 - 3)  acetaminophen     Tablet .. 650 milliGRAM(s) Oral every 6 hours PRN Mild Pain (1 - 3), Moderate Pain (4 - 6)  albuterol    90 MICROgram(s) HFA Inhaler 2 Puff(s) Inhalation every 6 hours PRN Shortness of Breath and/or Wheezing  aluminum hydroxide/magnesium hydroxide/simethicone Suspension 30 milliLiter(s) Oral every 6 hours PRN Dyspepsia  bisacodyl Suppository 10 milliGRAM(s) Rectal daily PRN constipation  dextrose Oral Gel 15 Gram(s) Oral once PRN Blood Glucose LESS THAN 70 milliGRAM(s)/deciliter  haloperidol    Injectable 2.5 milliGRAM(s) IntraMuscular once PRN Severe Agitation  QUEtiapine 50 milliGRAM(s) Oral every 6 hours PRN agitation  simethicone 80 milliGRAM(s) Chew every 8 hours PRN gas  sodium chloride 0.65% Nasal 2 Spray(s) Both Nostrils every 6 hours PRN congestion

## 2024-03-05 NOTE — BH INPATIENT PSYCHIATRY PROGRESS NOTE - MSE UNSTRUCTURED FT
Pt appears stated age, awake,  alert, poor relatedness Speech hard to understand due to poor articulation and edentulous at times sings her conversion. Has increased tremor but no evidence of rigidity, patient with anteroflexion- clearly lessMood somewhat  irritable, affect labile though less severe No suicidal/homicidal ideas/plan expressed. Religiously and somatically focused with Anabaptist delusions but less focused on Anabaptist delusions more on somatic issues  Thinking disjointed. Cannot assess cognition. Poor insight and judgement, is impulsive though less Pt appears stated age, awake,  alert, poor relatedness Speech hard to understand due to poor articulation and edentulous at times sings her conversion. Has increased tremor but no evidence of rigidity, patient with anteroflexion- clearly lessMood somewhat  irritable, affect labile though less severe No suicidal/homicidal ideas/plan expressed. Religiously and somatically focused with Amish delusions but less focused on Amish delusions more on somatic issues. Says she has two uteruses, can have a baby  Thinking disjointed. Cannot assess cognition. Poor insight and judgement, is impulsive though less

## 2024-03-05 NOTE — BH INPATIENT PSYCHIATRY PROGRESS NOTE - NSBHMETABOLIC_PSY_ALL_CORE_FT
BMI: BMI (kg/m2): 30.2 (01-15-24 @ 12:52)  HbA1c: A1C with Estimated Average Glucose Result: 7.6 % (01-12-24 @ 12:40)    Glucose: POCT Blood Glucose.: 106 mg/dL (03-05-24 @ 12:17)    BP: --Vital Signs Last 24 Hrs  T(C): 36.6 (03-05-24 @ 08:10), Max: 36.6 (03-05-24 @ 08:10)  T(F): 97.8 (03-05-24 @ 08:10), Max: 97.8 (03-05-24 @ 08:10)  HR: --  BP: --  BP(mean): --  RR: --  SpO2: --    Orthostatic VS  03-05-24 @ 08:10  Lying BP: --/-- HR: --  Sitting BP: 132/71 HR: 78  Standing BP: 139/88 HR: 81  Site: --  Mode: --  Orthostatic VS  03-04-24 @ 08:44  Lying BP: --/-- HR: --  Sitting BP: 136/74 HR: 75  Standing BP: 141/63 HR: 84  Site: upper right arm  Mode: electronic    Lipid Panel:

## 2024-03-05 NOTE — BH INPATIENT PSYCHIATRY PROGRESS NOTE - NSBHASSESSSUMMFT_PSY_ALL_CORE
2/5 Patient showing some improving trend will give more time, if needs more medication titration due to ongoing symptoms will increase Seroquel not haldol  2/6 Improving cont meds, offered prn suppository and simethicone  2/7 Slowly improving will cont current meds, eval need for further increase Seroquel  2/8 Partial response continue meds except will increase Seroquel as was on 250mg/d PTA  2/9 Overall gains, still regresses and becomes difficult to manage will increase Seroquel to 200mg/d in divided dosing  2/12 A little calmer since started on additional mid day, cont other meds w/o change  2/13 Patient better still disorganized, hyper Christian but less agitated, cont current treatment  2/14 Improved globally with some symptom variability. Cont current med consider further increment Seroquel  2/15 Improved cont current regimen for now  2/16 Showing  some gains continue treatment for now at current doses   2/20 Improved, cont current rx. Scheduled urogyn for pessary replacement  2/21 Improved with variable intrusiveness. Cont meds will increase Miralax and Senna  2/22 Improving cont current rx, observe for effect of re-titration of laxatives  2/23 Lost balance mainly due to behavior however to be safe will lower haldol and Klonopin modestly and observe. Otherwise generally manageable  2/24: No PRNs needed overnight. Compliant with meds. As per staff last night pt was banging her Bible against the wall and lost her balance, staff held her. Pt remains talkative and intrusive at times but overall calmer and redirectable.   2/25: remains labile, loud, easily agitated but more redirectable.  2/26 Patient seen by medicine no evidence injury needing imaging, just Tylenol prn. Will cont current meds, if anteroflexion dosesn't improve with recent drop in haldol will consider further dose reduction  2/27 Has had slow increase in EPS despite no increase in dose and some reduction will hold haldol to reduce EPS and restart at lower dose  2/28 Patient with change in med tolerance some agitation and sedation. Will hold haldol tonight consider changin to Prolixin as may be a little better for EPS and will check labs to look for any medical issue  2/29 Labile irritable, increased tremor improved with washout of haldol. Will restart haldol but see if she can be stabilized at low dose to minimize tremor. COuld consider increasing Seroquel  3/1 Improved will see if she can stabilize on Seroquel and low dose haldol to minimize EPS will address nasal symptoms  3/2 Patient better less bent tremor less calmer. Attempt to find dose of haldol that control psychosis agitation with manageable tremor. May need to try 2mg bid as 1mg big may be subtherapeutic  3/4 Some improvement in that less agitated but EPS better will cont with low dose haldol Issues is that both side effects and decompenstion have had long lags related to dose changes so unclear if at haldol 1mg bid she is better EPS wise but will gradually decompensate. Cont to observe consider increase to 1mg tid. Patient's 2PC expiring she remains delusional disorganized not yet able to managed in SNF will apply for further retention  3/5 Showing some imrpovement with less agitation but also less EPS with lowered haldol. Cont meds will reduce klonopin from 0.25mg tid to bid to reduce polypharm cont other meds. COnsider increasing haldol to 1mg tid if agitation flares up   2/5 Patient showing some improving trend will give more time, if needs more medication titration due to ongoing symptoms will increase Seroquel not haldol  2/6 Improving cont meds, offered prn suppository and simethicone  2/7 Slowly improving will cont current meds, eval need for further increase Seroquel  2/8 Partial response continue meds except will increase Seroquel as was on 250mg/d PTA  2/9 Overall gains, still regresses and becomes difficult to manage will increase Seroquel to 200mg/d in divided dosing  2/12 A little calmer since started on additional mid day, cont other meds w/o change  2/13 Patient better still disorganized, hyper Yarsanism but less agitated, cont current treatment  2/14 Improved globally with some symptom variability. Cont current med consider further increment Seroquel  2/15 Improved cont current regimen for now  2/16 Showing  some gains continue treatment for now at current doses   2/20 Improved, cont current rx. Scheduled urogyn for pessary replacement  2/21 Improved with variable intrusiveness. Cont meds will increase Miralax and Senna  2/22 Improving cont current rx, observe for effect of re-titration of laxatives  2/23 Lost balance mainly due to behavior however to be safe will lower haldol and Klonopin modestly and observe. Otherwise generally manageable  2/24: No PRNs needed overnight. Compliant with meds. As per staff last night pt was banging her Bible against the wall and lost her balance, staff held her. Pt remains talkative and intrusive at times but overall calmer and redirectable.   2/25: remains labile, loud, easily agitated but more redirectable.  2/26 Patient seen by medicine no evidence injury needing imaging, just Tylenol prn. Will cont current meds, if anteroflexion dosesn't improve with recent drop in haldol will consider further dose reduction  2/27 Has had slow increase in EPS despite no increase in dose and some reduction will hold haldol to reduce EPS and restart at lower dose  2/28 Patient with change in med tolerance some agitation and sedation. Will hold haldol tonight consider changin to Prolixin as may be a little better for EPS and will check labs to look for any medical issue  2/29 Labile irritable, increased tremor improved with washout of haldol. Will restart haldol but see if she can be stabilized at low dose to minimize tremor. COuld consider increasing Seroquel  3/1 Improved will see if she can stabilize on Seroquel and low dose haldol to minimize EPS will address nasal symptoms  3/2 Patient better less bent tremor less calmer. Attempt to find dose of haldol that control psychosis agitation with manageable tremor. May need to try 2mg bid as 1mg big may be subtherapeutic  3/4 Some improvement in that less agitated but EPS better will cont with low dose haldol Issues is that both side effects and decompenstion have had long lags related to dose changes so unclear if at haldol 1mg bid she is better EPS wise but will gradually decompensate. Cont to observe consider increase to 1mg tid. Patient's 2PC expiring she remains delusional disorganized not yet able to managed in SNF will apply for further retention  3/5 Showing some imrpovement with less agitation despite delsuiosn but also less EPS with lowered haldol. Cont meds will reduce klonopin from 0.25mg tid to bid to reduce polypharm cont other meds. If calmer less disorganzed will ask PT if she can walker trial

## 2024-03-06 LAB
GLUCOSE BLDC GLUCOMTR-MCNC: 104 MG/DL — HIGH (ref 70–99)
GLUCOSE BLDC GLUCOMTR-MCNC: 112 MG/DL — HIGH (ref 70–99)
GLUCOSE BLDC GLUCOMTR-MCNC: 151 MG/DL — HIGH (ref 70–99)

## 2024-03-06 RX ADMIN — VALACYCLOVIR 1000 MILLIGRAM(S): 500 TABLET, FILM COATED ORAL at 20:54

## 2024-03-06 RX ADMIN — QUETIAPINE FUMARATE 150 MILLIGRAM(S): 200 TABLET, FILM COATED ORAL at 20:56

## 2024-03-06 RX ADMIN — VALACYCLOVIR 1000 MILLIGRAM(S): 500 TABLET, FILM COATED ORAL at 08:52

## 2024-03-06 RX ADMIN — Medication 0.25 MILLIGRAM(S): at 20:54

## 2024-03-06 RX ADMIN — Medication 1 TABLET(S): at 20:54

## 2024-03-06 RX ADMIN — QUETIAPINE FUMARATE 50 MILLIGRAM(S): 200 TABLET, FILM COATED ORAL at 13:29

## 2024-03-06 RX ADMIN — QUETIAPINE FUMARATE 50 MILLIGRAM(S): 200 TABLET, FILM COATED ORAL at 08:52

## 2024-03-06 RX ADMIN — HALOPERIDOL DECANOATE 1 MILLIGRAM(S): 100 INJECTION INTRAMUSCULAR at 20:54

## 2024-03-06 RX ADMIN — DIVALPROEX SODIUM 500 MILLIGRAM(S): 500 TABLET, DELAYED RELEASE ORAL at 20:56

## 2024-03-06 RX ADMIN — HALOPERIDOL DECANOATE 1 MILLIGRAM(S): 100 INJECTION INTRAMUSCULAR at 08:52

## 2024-03-06 RX ADMIN — DIVALPROEX SODIUM 500 MILLIGRAM(S): 500 TABLET, DELAYED RELEASE ORAL at 13:29

## 2024-03-06 RX ADMIN — LISINOPRIL 20 MILLIGRAM(S): 2.5 TABLET ORAL at 08:52

## 2024-03-06 RX ADMIN — CARBIDOPA AND LEVODOPA 1 TABLET(S): 25; 100 TABLET ORAL at 13:29

## 2024-03-06 RX ADMIN — Medication 75 MICROGRAM(S): at 05:30

## 2024-03-06 RX ADMIN — DIVALPROEX SODIUM 500 MILLIGRAM(S): 500 TABLET, DELAYED RELEASE ORAL at 08:52

## 2024-03-06 RX ADMIN — METFORMIN HYDROCHLORIDE 500 MILLIGRAM(S): 850 TABLET ORAL at 20:55

## 2024-03-06 RX ADMIN — POLYETHYLENE GLYCOL 3350 17 GRAM(S): 17 POWDER, FOR SOLUTION ORAL at 08:51

## 2024-03-06 RX ADMIN — SENNA PLUS 2 TABLET(S): 8.6 TABLET ORAL at 20:54

## 2024-03-06 RX ADMIN — Medication 50 MILLIGRAM(S): at 08:53

## 2024-03-06 RX ADMIN — CARBIDOPA AND LEVODOPA 1 TABLET(S): 25; 100 TABLET ORAL at 08:51

## 2024-03-06 RX ADMIN — METFORMIN HYDROCHLORIDE 500 MILLIGRAM(S): 850 TABLET ORAL at 08:52

## 2024-03-06 RX ADMIN — Medication 0.25 MILLIGRAM(S): at 08:51

## 2024-03-06 RX ADMIN — CARBIDOPA AND LEVODOPA 1 TABLET(S): 25; 100 TABLET ORAL at 20:56

## 2024-03-06 NOTE — BH INPATIENT PSYCHIATRY PROGRESS NOTE - NSBHASSESSSUMMFT_PSY_ALL_CORE
2/5 Patient showing some improving trend will give more time, if needs more medication titration due to ongoing symptoms will increase Seroquel not haldol  2/6 Improving cont meds, offered prn suppository and simethicone  2/7 Slowly improving will cont current meds, eval need for further increase Seroquel  2/8 Partial response continue meds except will increase Seroquel as was on 250mg/d PTA  2/9 Overall gains, still regresses and becomes difficult to manage will increase Seroquel to 200mg/d in divided dosing  2/12 A little calmer since started on additional mid day, cont other meds w/o change  2/13 Patient better still disorganized, hyper Yarsanism but less agitated, cont current treatment  2/14 Improved globally with some symptom variability. Cont current med consider further increment Seroquel  2/15 Improved cont current regimen for now  2/16 Showing  some gains continue treatment for now at current doses   2/20 Improved, cont current rx. Scheduled urogyn for pessary replacement  2/21 Improved with variable intrusiveness. Cont meds will increase Miralax and Senna  2/22 Improving cont current rx, observe for effect of re-titration of laxatives  2/23 Lost balance mainly due to behavior however to be safe will lower haldol and Klonopin modestly and observe. Otherwise generally manageable  2/24: No PRNs needed overnight. Compliant with meds. As per staff last night pt was banging her Bible against the wall and lost her balance, staff held her. Pt remains talkative and intrusive at times but overall calmer and redirectable.   2/25: remains labile, loud, easily agitated but more redirectable.  2/26 Patient seen by medicine no evidence injury needing imaging, just Tylenol prn. Will cont current meds, if anteroflexion dosesn't improve with recent drop in haldol will consider further dose reduction  2/27 Has had slow increase in EPS despite no increase in dose and some reduction will hold haldol to reduce EPS and restart at lower dose  2/28 Patient with change in med tolerance some agitation and sedation. Will hold haldol tonight consider changin to Prolixin as may be a little better for EPS and will check labs to look for any medical issue  2/29 Labile irritable, increased tremor improved with washout of haldol. Will restart haldol but see if she can be stabilized at low dose to minimize tremor. COuld consider increasing Seroquel  3/1 Improved will see if she can stabilize on Seroquel and low dose haldol to minimize EPS will address nasal symptoms  3/2 Patient better less bent tremor less calmer. Attempt to find dose of haldol that control psychosis agitation with manageable tremor. May need to try 2mg bid as 1mg big may be subtherapeutic  3/4 Some improvement in that less agitated but EPS better will cont with low dose haldol Issues is that both side effects and decompenstion have had long lags related to dose changes so unclear if at haldol 1mg bid she is better EPS wise but will gradually decompensate. Cont to observe consider increase to 1mg tid. Patient's 2PC expiring she remains delusional disorganized not yet able to managed in SNF will apply for further retention  3/5 Showing some imrpovement with less agitation despite delsuiosn but also less EPS with lowered haldol. Cont meds will reduce klonopin from 0.25mg tid to bid to reduce polypharm cont other meds. If calmer less disorganzed will ask PT if she can walker trial  3/6: behavior in better control, delusional, suspected AH. taking meds.

## 2024-03-06 NOTE — BH INPATIENT PSYCHIATRY PROGRESS NOTE - NSBHCHARTREVIEWVS_PSY_A_CORE FT
Physician Progress Note      PATIENTEdwin Sheehan  CSN #:                  [id number]  :                       1947  ADMIT DATE:       2023 6:10 PM  [address] [facility] DATE:        2023 11:28 AM  RESPONDING  PROVIDER #:        Gaudencio Mayorga MD          QUERY TEXT:    Pt admitted with fatigue, Altered mental status/Encephalopathy. Pt noted to   have documentation of prior CVA, Possible UTI, and migraine. If possible,   please document in progress notes and discharge summary the relationship, if   any. The medical record reflects the following:  Risk Factors: Prior CVA, Advanced age  Clinical Indicators: [facility], H&P states:   \". ..UTI. Claire Brash Claier Brash Patient with a headache. Claire Brash Claire Brash ? Migraine from uncontrolled   hypertension. Claire Brash Claire Brash Patient with migraine. Will get MRI especially with questionable   CVA. CTH shows old CVA but no acute bleed or infarct. No evidence of   hemorrhage. Claire Garrett Creatinine 1.1. on admission. Baseline 0.9. Received IVF bolus   and improved to baseline. Claire Brash Claire Brash \"  Treatment: Labs, imaging, Pt left AMA    Thank you,  Jazzy WOODALL, RN, CRCR  Clinical Documentation Improvement  [email or web address]  Options provided:  -- Altered mental status is due to metabolic encephalopathy  -- Encephalopathy due to Migraine  -- Encephalopathy due to Sequelae of prior CVA  -- Encephalopathy due to possible UTI  -- Other - I will add my own diagnosis  -- Disagree - Not applicable / Not valid  -- Disagree - Clinically unable to determine / Unknown  -- Refer to Clinical Documentation Reviewer    PROVIDER RESPONSE TEXT:    Provider is clinically unable to determine a response to this query.     Query created by: Cheli Henriquez on 2023 11:37 AM      Electronically signed by:  Gaudencio Mayorga MD 2023 11:40 AM Vital Signs Last 24 Hrs  T(C): 36.7 (03-06-24 @ 08:19), Max: 36.7 (03-06-24 @ 08:19)  T(F): 98.1 (03-06-24 @ 08:19), Max: 98.1 (03-06-24 @ 08:19)  HR: --  BP: --  BP(mean): --  RR: --  SpO2: --    Orthostatic VS  03-06-24 @ 08:19  Lying BP: --/-- HR: --  Sitting BP: 138/84 HR: 82  Standing BP: 138/91 HR: 86  Site: upper right arm  Mode: electronic  Orthostatic VS  03-05-24 @ 08:10  Lying BP: --/-- HR: --  Sitting BP: 132/71 HR: 78  Standing BP: 139/88 HR: 81  Site: --  Mode: --

## 2024-03-06 NOTE — BH INPATIENT PSYCHIATRY PROGRESS NOTE - CURRENT MEDICATION
MEDICATIONS  (STANDING):  ammonium lactate 12% Lotion 1 Application(s) Topical two times a day  carbidopa/levodopa  25/100 1 Tablet(s) Oral three times a day  clonazePAM Oral Disintegrating Tablet 0.25 milliGRAM(s) Oral two times a day  dextrose 5%. 1000 milliLiter(s) (50 mL/Hr) IV Continuous <Continuous>  dextrose 5%. 1000 milliLiter(s) (100 mL/Hr) IV Continuous <Continuous>  dextrose 50% Injectable 25 Gram(s) IV Push once  dextrose 50% Injectable 25 Gram(s) IV Push once  dextrose 50% Injectable 12.5 Gram(s) IV Push once  divalproex Sprinkle 500 milliGRAM(s) Oral three times a day  glucagon  Injectable 1 milliGRAM(s) IntraMuscular once  haloperidol     Tablet 1 milliGRAM(s) Oral two times a day  hemorrhoidal Ointment 1 Application(s) Rectal daily  hydrocortisone 2.5% Ointment 1 Application(s) Topical two times a day  insulin lispro (ADMELOG) corrective regimen sliding scale   SubCutaneous three times a day before meals  levothyroxine 75 MICROGram(s) Oral daily  lisinopril 20 milliGRAM(s) Oral daily  metFORMIN 500 milliGRAM(s) Oral two times a day  metoprolol succinate ER 50 milliGRAM(s) Oral daily  multivitamin 1 Tablet(s) Oral at bedtime  polyethylene glycol 3350 17 Gram(s) Oral daily  QUEtiapine 50 milliGRAM(s) Oral <User Schedule>  QUEtiapine 150 milliGRAM(s) Oral at bedtime  senna 2 Tablet(s) Oral at bedtime  valACYclovir 1000 milliGRAM(s) Oral every 12 hours    MEDICATIONS  (PRN):  acetaminophen     Tablet .. 650 milliGRAM(s) Oral every 6 hours PRN Mild Pain (1 - 3)  acetaminophen     Tablet .. 650 milliGRAM(s) Oral every 6 hours PRN Mild Pain (1 - 3), Moderate Pain (4 - 6)  albuterol    90 MICROgram(s) HFA Inhaler 2 Puff(s) Inhalation every 6 hours PRN Shortness of Breath and/or Wheezing  aluminum hydroxide/magnesium hydroxide/simethicone Suspension 30 milliLiter(s) Oral every 6 hours PRN Dyspepsia  bisacodyl Suppository 10 milliGRAM(s) Rectal daily PRN constipation  dextrose Oral Gel 15 Gram(s) Oral once PRN Blood Glucose LESS THAN 70 milliGRAM(s)/deciliter  haloperidol    Injectable 2.5 milliGRAM(s) IntraMuscular once PRN Severe Agitation  QUEtiapine 50 milliGRAM(s) Oral every 6 hours PRN agitation  simethicone 80 milliGRAM(s) Chew every 8 hours PRN gas  sodium chloride 0.65% Nasal 2 Spray(s) Both Nostrils every 6 hours PRN congestion

## 2024-03-06 NOTE — BH INPATIENT PSYCHIATRY PROGRESS NOTE - NSBHFUPINTERVALHXFT_PSY_A_CORE
f/up SAD, care discussed w/ tx team, LEWISS. Patient perseverated about having a roommate, went back to the days when she shared a room w/ her sister. Patient reported that she hears god and "god speaks to me in a special way". Continues to have delusions of pregnancy and having 2 uterus'. Patient able to sit  in group this AM. denied dizziness and reports BM 2 days ago. RN to encourage pt to take Miralax. Reported fair sleep and good appetite. Patient is irritable at times, can be redirected.

## 2024-03-06 NOTE — BH INPATIENT PSYCHIATRY PROGRESS NOTE - MSE UNSTRUCTURED FT
Pt appears stated age, awake,  alert, poor relatedness Speech dysarthric at times. Has increased tremor but no evidence of rigidity, patient with anteroflexion- clearly less, Mood somewhat  irritable, affect labile though less severe No suicidal/homicidal ideas/plan expressed. Religiously and somatically focused with Holiness delusions but less focused on Holiness delusions more on somatic issues. Says she has two uteruses, can have a baby  Thinking disjointed. Poor insight and judgement, is impulsive though less, cognition: alert and oriented x 3.

## 2024-03-06 NOTE — BH INPATIENT PSYCHIATRY PROGRESS NOTE - NSBHMETABOLIC_PSY_ALL_CORE_FT
BMI: BMI (kg/m2): 30.2 (01-15-24 @ 12:52)  HbA1c: A1C with Estimated Average Glucose Result: 7.6 % (01-12-24 @ 12:40)    Glucose: POCT Blood Glucose.: 104 mg/dL (03-06-24 @ 12:21)    BP: --Vital Signs Last 24 Hrs  T(C): 36.7 (03-06-24 @ 08:19), Max: 36.7 (03-06-24 @ 08:19)  T(F): 98.1 (03-06-24 @ 08:19), Max: 98.1 (03-06-24 @ 08:19)  HR: --  BP: --  BP(mean): --  RR: --  SpO2: --    Orthostatic VS  03-06-24 @ 08:19  Lying BP: --/-- HR: --  Sitting BP: 138/84 HR: 82  Standing BP: 138/91 HR: 86  Site: upper right arm  Mode: electronic  Orthostatic VS  03-05-24 @ 08:10  Lying BP: --/-- HR: --  Sitting BP: 132/71 HR: 78  Standing BP: 139/88 HR: 81  Site: --  Mode: --    Lipid Panel:

## 2024-03-07 LAB
GLUCOSE BLDC GLUCOMTR-MCNC: 105 MG/DL — HIGH (ref 70–99)
GLUCOSE BLDC GLUCOMTR-MCNC: 109 MG/DL — HIGH (ref 70–99)
GLUCOSE BLDC GLUCOMTR-MCNC: 164 MG/DL — HIGH (ref 70–99)

## 2024-03-07 PROCEDURE — 99232 SBSQ HOSP IP/OBS MODERATE 35: CPT

## 2024-03-07 RX ADMIN — QUETIAPINE FUMARATE 50 MILLIGRAM(S): 200 TABLET, FILM COATED ORAL at 10:22

## 2024-03-07 RX ADMIN — VALACYCLOVIR 1000 MILLIGRAM(S): 500 TABLET, FILM COATED ORAL at 21:42

## 2024-03-07 RX ADMIN — DIVALPROEX SODIUM 500 MILLIGRAM(S): 500 TABLET, DELAYED RELEASE ORAL at 09:33

## 2024-03-07 RX ADMIN — Medication 0.25 MILLIGRAM(S): at 21:42

## 2024-03-07 RX ADMIN — Medication 1 TABLET(S): at 21:42

## 2024-03-07 RX ADMIN — POLYETHYLENE GLYCOL 3350 17 GRAM(S): 17 POWDER, FOR SOLUTION ORAL at 09:33

## 2024-03-07 RX ADMIN — CARBIDOPA AND LEVODOPA 1 TABLET(S): 25; 100 TABLET ORAL at 13:15

## 2024-03-07 RX ADMIN — Medication 50 MILLIGRAM(S): at 09:34

## 2024-03-07 RX ADMIN — HALOPERIDOL DECANOATE 1 MILLIGRAM(S): 100 INJECTION INTRAMUSCULAR at 21:41

## 2024-03-07 RX ADMIN — DIVALPROEX SODIUM 500 MILLIGRAM(S): 500 TABLET, DELAYED RELEASE ORAL at 13:15

## 2024-03-07 RX ADMIN — Medication 0.25 MILLIGRAM(S): at 09:33

## 2024-03-07 RX ADMIN — Medication 75 MICROGRAM(S): at 06:38

## 2024-03-07 RX ADMIN — CARBIDOPA AND LEVODOPA 1 TABLET(S): 25; 100 TABLET ORAL at 09:34

## 2024-03-07 RX ADMIN — CARBIDOPA AND LEVODOPA 1 TABLET(S): 25; 100 TABLET ORAL at 21:43

## 2024-03-07 RX ADMIN — SENNA PLUS 2 TABLET(S): 8.6 TABLET ORAL at 21:42

## 2024-03-07 RX ADMIN — LISINOPRIL 20 MILLIGRAM(S): 2.5 TABLET ORAL at 09:33

## 2024-03-07 RX ADMIN — QUETIAPINE FUMARATE 50 MILLIGRAM(S): 200 TABLET, FILM COATED ORAL at 13:15

## 2024-03-07 RX ADMIN — METFORMIN HYDROCHLORIDE 500 MILLIGRAM(S): 850 TABLET ORAL at 09:33

## 2024-03-07 RX ADMIN — VALACYCLOVIR 1000 MILLIGRAM(S): 500 TABLET, FILM COATED ORAL at 09:33

## 2024-03-07 RX ADMIN — DIVALPROEX SODIUM 500 MILLIGRAM(S): 500 TABLET, DELAYED RELEASE ORAL at 21:41

## 2024-03-07 RX ADMIN — METFORMIN HYDROCHLORIDE 500 MILLIGRAM(S): 850 TABLET ORAL at 21:42

## 2024-03-07 RX ADMIN — QUETIAPINE FUMARATE 150 MILLIGRAM(S): 200 TABLET, FILM COATED ORAL at 21:40

## 2024-03-07 NOTE — BH INPATIENT PSYCHIATRY PROGRESS NOTE - NSBHCHARTREVIEWVS_PSY_A_CORE FT
Vital Signs Last 24 Hrs  T(C): 36.9 (03-07-24 @ 11:04), Max: 36.9 (03-07-24 @ 11:04)  T(F): 98.4 (03-07-24 @ 11:04), Max: 98.4 (03-07-24 @ 11:04)  HR: --  BP: --  BP(mean): --  RR: --  SpO2: --    Orthostatic VS  03-07-24 @ 11:04  Lying BP: --/-- HR: --  Sitting BP: 152/91 HR: 102  Standing BP: 145/99 HR: 96  Site: upper left arm  Mode: electronic  Orthostatic VS  03-06-24 @ 08:19  Lying BP: --/-- HR: --  Sitting BP: 138/84 HR: 82  Standing BP: 138/91 HR: 86  Site: upper right arm  Mode: electronic

## 2024-03-07 NOTE — BH INPATIENT PSYCHIATRY PROGRESS NOTE - MSE UNSTRUCTURED FT
Pt appears stated age, awake,  alert, poor relatedness Speech dysarthric at times. Has increased tremor but no evidence of rigidity, patient with anteroflexion- clearly less, Mood  irritable, affect labile though less severe No suicidal/homicidal ideas/plan expressed. Religiously and somatically focused with Baptism delusions but less focused on Baptism delusions more on somatic issues. Says she has two uteruses, can have a baby  Thinking disjointed. Poor insight and judgement, is impulsive though less, cognition: alert and oriented x 3.

## 2024-03-07 NOTE — BH INPATIENT PSYCHIATRY PROGRESS NOTE - NSBHFUPINTERVALHXFT_PSY_A_CORE
f/up SAD, care discussed w/ tx team, LEWISS. Patient perseverated about not receiving home fries this AM w/ breakfast and someone taking her bible. Patient was loud this AM, perseverating about her bible, was able to be redirected.  Reported fair sleep and good appetite. Patient taking meds, observed to have ++tremors. denied AH today. denied dizziness.

## 2024-03-07 NOTE — BH INPATIENT PSYCHIATRY PROGRESS NOTE - NSBHASSESSSUMMFT_PSY_ALL_CORE
2/5 Patient showing some improving trend will give more time, if needs more medication titration due to ongoing symptoms will increase Seroquel not haldol  2/6 Improving cont meds, offered prn suppository and simethicone  2/7 Slowly improving will cont current meds, eval need for further increase Seroquel  2/8 Partial response continue meds except will increase Seroquel as was on 250mg/d PTA  2/9 Overall gains, still regresses and becomes difficult to manage will increase Seroquel to 200mg/d in divided dosing  2/12 A little calmer since started on additional mid day, cont other meds w/o change  2/13 Patient better still disorganized, hyper Shinto but less agitated, cont current treatment  2/14 Improved globally with some symptom variability. Cont current med consider further increment Seroquel  2/15 Improved cont current regimen for now  2/16 Showing  some gains continue treatment for now at current doses   2/20 Improved, cont current rx. Scheduled urogyn for pessary replacement  2/21 Improved with variable intrusiveness. Cont meds will increase Miralax and Senna  2/22 Improving cont current rx, observe for effect of re-titration of laxatives  2/23 Lost balance mainly due to behavior however to be safe will lower haldol and Klonopin modestly and observe. Otherwise generally manageable  2/24: No PRNs needed overnight. Compliant with meds. As per staff last night pt was banging her Bible against the wall and lost her balance, staff held her. Pt remains talkative and intrusive at times but overall calmer and redirectable.   2/25: remains labile, loud, easily agitated but more redirectable.  2/26 Patient seen by medicine no evidence injury needing imaging, just Tylenol prn. Will cont current meds, if anteroflexion dosesn't improve with recent drop in haldol will consider further dose reduction  2/27 Has had slow increase in EPS despite no increase in dose and some reduction will hold haldol to reduce EPS and restart at lower dose  2/28 Patient with change in med tolerance some agitation and sedation. Will hold haldol tonight consider changin to Prolixin as may be a little better for EPS and will check labs to look for any medical issue  2/29 Labile irritable, increased tremor improved with washout of haldol. Will restart haldol but see if she can be stabilized at low dose to minimize tremor. COuld consider increasing Seroquel  3/1 Improved will see if she can stabilize on Seroquel and low dose haldol to minimize EPS will address nasal symptoms  3/2 Patient better less bent tremor less calmer. Attempt to find dose of haldol that control psychosis agitation with manageable tremor. May need to try 2mg bid as 1mg big may be subtherapeutic  3/4 Some improvement in that less agitated but EPS better will cont with low dose haldol Issues is that both side effects and decompenstion have had long lags related to dose changes so unclear if at haldol 1mg bid she is better EPS wise but will gradually decompensate. Cont to observe consider increase to 1mg tid. Patient's 2PC expiring she remains delusional disorganized not yet able to managed in SNF will apply for further retention  3/5 Showing some imrpovement with less agitation despite delsuiosn but also less EPS with lowered haldol. Cont meds will reduce klonopin from 0.25mg tid to bid to reduce polypharm cont other meds. If calmer less disorganzed will ask PT if she can walker trial  3/6: behavior in better control, delusional, suspected AH. taking meds.   3/7: can become irritable, redirectable. c/w current meds, ambulating hunched over.

## 2024-03-07 NOTE — BH INPATIENT PSYCHIATRY PROGRESS NOTE - NSBHMETABOLIC_PSY_ALL_CORE_FT
BMI: BMI (kg/m2): 30.2 (01-15-24 @ 12:52)  HbA1c: A1C with Estimated Average Glucose Result: 7.6 % (01-12-24 @ 12:40)    Glucose: POCT Blood Glucose.: 109 mg/dL (03-07-24 @ 12:15)    BP: --Vital Signs Last 24 Hrs  T(C): 36.9 (03-07-24 @ 11:04), Max: 36.9 (03-07-24 @ 11:04)  T(F): 98.4 (03-07-24 @ 11:04), Max: 98.4 (03-07-24 @ 11:04)  HR: --  BP: --  BP(mean): --  RR: --  SpO2: --    Orthostatic VS  03-07-24 @ 11:04  Lying BP: --/-- HR: --  Sitting BP: 152/91 HR: 102  Standing BP: 145/99 HR: 96  Site: upper left arm  Mode: electronic  Orthostatic VS  03-06-24 @ 08:19  Lying BP: --/-- HR: --  Sitting BP: 138/84 HR: 82  Standing BP: 138/91 HR: 86  Site: upper right arm  Mode: electronic    Lipid Panel:

## 2024-03-07 NOTE — BH INPATIENT PSYCHIATRY PROGRESS NOTE - CURRENT MEDICATION
MEDICATIONS  (STANDING):  ammonium lactate 12% Lotion 1 Application(s) Topical two times a day  carbidopa/levodopa  25/100 1 Tablet(s) Oral three times a day  clonazePAM Oral Disintegrating Tablet 0.25 milliGRAM(s) Oral two times a day  dextrose 5%. 1000 milliLiter(s) (50 mL/Hr) IV Continuous <Continuous>  dextrose 5%. 1000 milliLiter(s) (100 mL/Hr) IV Continuous <Continuous>  dextrose 50% Injectable 25 Gram(s) IV Push once  dextrose 50% Injectable 12.5 Gram(s) IV Push once  dextrose 50% Injectable 25 Gram(s) IV Push once  divalproex Sprinkle 500 milliGRAM(s) Oral three times a day  glucagon  Injectable 1 milliGRAM(s) IntraMuscular once  haloperidol     Tablet 1 milliGRAM(s) Oral two times a day  hemorrhoidal Ointment 1 Application(s) Rectal daily  hydrocortisone 2.5% Ointment 1 Application(s) Topical two times a day  insulin lispro (ADMELOG) corrective regimen sliding scale   SubCutaneous three times a day before meals  levothyroxine 75 MICROGram(s) Oral daily  lisinopril 20 milliGRAM(s) Oral daily  metFORMIN 500 milliGRAM(s) Oral two times a day  metoprolol succinate ER 50 milliGRAM(s) Oral daily  multivitamin 1 Tablet(s) Oral at bedtime  polyethylene glycol 3350 17 Gram(s) Oral daily  QUEtiapine 50 milliGRAM(s) Oral <User Schedule>  QUEtiapine 150 milliGRAM(s) Oral at bedtime  senna 2 Tablet(s) Oral at bedtime  valACYclovir 1000 milliGRAM(s) Oral every 12 hours    MEDICATIONS  (PRN):  acetaminophen     Tablet .. 650 milliGRAM(s) Oral every 6 hours PRN Mild Pain (1 - 3)  acetaminophen     Tablet .. 650 milliGRAM(s) Oral every 6 hours PRN Mild Pain (1 - 3), Moderate Pain (4 - 6)  albuterol    90 MICROgram(s) HFA Inhaler 2 Puff(s) Inhalation every 6 hours PRN Shortness of Breath and/or Wheezing  aluminum hydroxide/magnesium hydroxide/simethicone Suspension 30 milliLiter(s) Oral every 6 hours PRN Dyspepsia  bisacodyl Suppository 10 milliGRAM(s) Rectal daily PRN constipation  dextrose Oral Gel 15 Gram(s) Oral once PRN Blood Glucose LESS THAN 70 milliGRAM(s)/deciliter  haloperidol    Injectable 2.5 milliGRAM(s) IntraMuscular once PRN Severe Agitation  QUEtiapine 50 milliGRAM(s) Oral every 6 hours PRN agitation  simethicone 80 milliGRAM(s) Chew every 8 hours PRN gas  sodium chloride 0.65% Nasal 2 Spray(s) Both Nostrils every 6 hours PRN congestion

## 2024-03-08 ENCOUNTER — APPOINTMENT (OUTPATIENT)
Dept: UROLOGY | Facility: CLINIC | Age: 74
End: 2024-03-08
Payer: MEDICARE

## 2024-03-08 VITALS
HEART RATE: 87 BPM | OXYGEN SATURATION: 97 % | SYSTOLIC BLOOD PRESSURE: 118 MMHG | RESPIRATION RATE: 16 BRPM | DIASTOLIC BLOOD PRESSURE: 77 MMHG

## 2024-03-08 DIAGNOSIS — R31.29 OTHER MICROSCOPIC HEMATURIA: ICD-10-CM

## 2024-03-08 DIAGNOSIS — R39.15 URGENCY OF URINATION: ICD-10-CM

## 2024-03-08 DIAGNOSIS — N81.11 CYSTOCELE, MIDLINE: ICD-10-CM

## 2024-03-08 DIAGNOSIS — R35.0 FREQUENCY OF MICTURITION: ICD-10-CM

## 2024-03-08 DIAGNOSIS — N39.41 URGE INCONTINENCE: ICD-10-CM

## 2024-03-08 LAB
GLUCOSE BLDC GLUCOMTR-MCNC: 152 MG/DL — HIGH (ref 70–99)
GLUCOSE BLDC GLUCOMTR-MCNC: 97 MG/DL — SIGNIFICANT CHANGE UP (ref 70–99)

## 2024-03-08 PROCEDURE — 99204 OFFICE O/P NEW MOD 45 MIN: CPT

## 2024-03-08 PROCEDURE — 99232 SBSQ HOSP IP/OBS MODERATE 35: CPT

## 2024-03-08 RX ADMIN — CARBIDOPA AND LEVODOPA 1 TABLET(S): 25; 100 TABLET ORAL at 09:36

## 2024-03-08 RX ADMIN — Medication 0.25 MILLIGRAM(S): at 21:15

## 2024-03-08 RX ADMIN — LISINOPRIL 20 MILLIGRAM(S): 2.5 TABLET ORAL at 09:35

## 2024-03-08 RX ADMIN — Medication 0.25 MILLIGRAM(S): at 09:37

## 2024-03-08 RX ADMIN — VALACYCLOVIR 1000 MILLIGRAM(S): 500 TABLET, FILM COATED ORAL at 09:36

## 2024-03-08 RX ADMIN — POLYETHYLENE GLYCOL 3350 17 GRAM(S): 17 POWDER, FOR SOLUTION ORAL at 09:35

## 2024-03-08 RX ADMIN — QUETIAPINE FUMARATE 50 MILLIGRAM(S): 200 TABLET, FILM COATED ORAL at 09:37

## 2024-03-08 RX ADMIN — METFORMIN HYDROCHLORIDE 500 MILLIGRAM(S): 850 TABLET ORAL at 09:36

## 2024-03-08 RX ADMIN — DIVALPROEX SODIUM 500 MILLIGRAM(S): 500 TABLET, DELAYED RELEASE ORAL at 14:05

## 2024-03-08 RX ADMIN — METFORMIN HYDROCHLORIDE 500 MILLIGRAM(S): 850 TABLET ORAL at 21:13

## 2024-03-08 RX ADMIN — DIVALPROEX SODIUM 500 MILLIGRAM(S): 500 TABLET, DELAYED RELEASE ORAL at 21:15

## 2024-03-08 RX ADMIN — QUETIAPINE FUMARATE 150 MILLIGRAM(S): 200 TABLET, FILM COATED ORAL at 21:14

## 2024-03-08 RX ADMIN — QUETIAPINE FUMARATE 50 MILLIGRAM(S): 200 TABLET, FILM COATED ORAL at 14:05

## 2024-03-08 RX ADMIN — DIVALPROEX SODIUM 500 MILLIGRAM(S): 500 TABLET, DELAYED RELEASE ORAL at 09:35

## 2024-03-08 RX ADMIN — Medication 50 MILLIGRAM(S): at 09:37

## 2024-03-08 RX ADMIN — SENNA PLUS 2 TABLET(S): 8.6 TABLET ORAL at 21:14

## 2024-03-08 RX ADMIN — HALOPERIDOL DECANOATE 1 MILLIGRAM(S): 100 INJECTION INTRAMUSCULAR at 21:13

## 2024-03-08 RX ADMIN — CARBIDOPA AND LEVODOPA 1 TABLET(S): 25; 100 TABLET ORAL at 14:06

## 2024-03-08 RX ADMIN — CARBIDOPA AND LEVODOPA 1 TABLET(S): 25; 100 TABLET ORAL at 21:15

## 2024-03-08 RX ADMIN — Medication 1 TABLET(S): at 21:15

## 2024-03-08 RX ADMIN — Medication 75 MICROGRAM(S): at 06:46

## 2024-03-08 RX ADMIN — VALACYCLOVIR 1000 MILLIGRAM(S): 500 TABLET, FILM COATED ORAL at 21:14

## 2024-03-08 NOTE — BH INPATIENT PSYCHIATRY PROGRESS NOTE - MSE UNSTRUCTURED FT
Pt appears stated age, awake,  alert, poor relatedness Speech dysarthric at times. Has increased tremor but no evidence of rigidity, patient with anteroflexion- clearly less, Mood  "anxious". , affect constricted, No suicidal/homicidal ideas/plan expressed. Religiously and somatically focused with Episcopal delusions but less focused on Episcopal delusions more on somatic issues. Says she has two uteruses, can have a baby  Thinking disjointed. Poor insight and judgement, is impulsive though less, cognition: alert and oriented x 3.

## 2024-03-08 NOTE — ASSESSMENT
[FreeTextEntry1] : patient here wiht c/o cystocele and luts  states to have pessary  jan 2024 - mike : no hydro  urine - 6 red cells but cx -+ ( contam) 1- recommend - repeat urine for blood- declines SC today  2- Bladder US to eval for pre and post void  3- recommend GYN eval for pessary if bothered by prolapse 4- if repeat urine again wiht small blood--recommend CYSTO in office ( if compliant)

## 2024-03-08 NOTE — PHYSICAL EXAM
[Normal Appearance] : normal appearance [Well Groomed] : well groomed [General Appearance - In No Acute Distress] : no acute distress [Respiration, Rhythm And Depth] : normal respiratory rhythm and effort [Exaggerated Use Of Accessory Muscles For Inspiration] : no accessory muscle use [Abdomen Soft] : soft [Abdomen Tenderness] : non-tender [Urethral Meatus] : normal urethra [External Female Genitalia] : normal external genitalia [] : no rash [No Focal Deficits] : no focal deficits [Affect] : the affect was normal [Mood] : the mood was normal [FreeTextEntry1] : dry mucous memebranes [de-identified] : soft urethr and bladder, + cystocele grade 2-3 , no stool felt vag in rectal vault [de-identified] : some edema bilat at LE [de-identified] : in wheelchair

## 2024-03-08 NOTE — HISTORY OF PRESENT ILLNESS
[FreeTextEntry1] : patient poor historian here with transport aide from [facility]  c/o frequncy and uregency and INC  uses pullups denies any pain wiht void but bothered by bulge felt vaginally  reports dec fluid intake due to luts and INC  bowel habits OK  here for ? pessary:

## 2024-03-08 NOTE — BH INPATIENT PSYCHIATRY PROGRESS NOTE - NSBHFUPINTERVALHXFT_PSY_A_CORE
f/up SAD, care discussed w/ tx team, LEWISS. Patient reported feeling "nervous" about going to her appointment. Patient continues to perseverate about her bible, was able to be redirected.  Reported fair sleep and good appetite. Patient taking meds, observed to have ++tremors. denied AH today. denied dizziness. Patient tolerated going to appointment and recommendations included: UA, bladder sonogram and ob gyn consult for pessary. alert and oriented x 3. as per staff, overall behaviors in better control.

## 2024-03-08 NOTE — HISTORY OF PRESENT ILLNESS
[FreeTextEntry1] : patient poor historian here with transport aide from [facility]  c/o frequncy and uregency and INC  uses pullups denies any pain wiht void but bothered by bulge felt vaginally  reports dec fluid intake due to luts and INC  bowel habits OK  here for ? pessary:

## 2024-03-08 NOTE — BH INPATIENT PSYCHIATRY PROGRESS NOTE - NSBHCHARTREVIEWVS_PSY_A_CORE FT
Vital Signs Last 24 Hrs  T(C): --  T(F): --  HR: --  BP: --  BP(mean): --  RR: --  SpO2: --    Orthostatic VS  03-08-24 @ 08:11  Lying BP: --/-- HR: --  Sitting BP: 152/92 HR: 86  Standing BP: --/-- HR: --  Site: --  Mode: --  Orthostatic VS  03-07-24 @ 11:04  Lying BP: --/-- HR: --  Sitting BP: 152/91 HR: 102  Standing BP: 145/99 HR: 96  Site: upper left arm  Mode: electronic

## 2024-03-08 NOTE — PHYSICAL EXAM
[Normal Appearance] : normal appearance [Well Groomed] : well groomed [General Appearance - In No Acute Distress] : no acute distress [Respiration, Rhythm And Depth] : normal respiratory rhythm and effort [Exaggerated Use Of Accessory Muscles For Inspiration] : no accessory muscle use [Abdomen Soft] : soft [Abdomen Tenderness] : non-tender [Urethral Meatus] : the meatus of the urethra showed no abnormalities [External Female Genitalia] : normal external genitalia [] : no rash [No Focal Deficits] : no focal deficits [Mood] : the mood was normal [Affect] : the affect was normal [FreeTextEntry1] : dry mucous memebranes [de-identified] : soft urethr and bladder, + cystocele grade 2-3 , no stool felt vag in rectal vault [de-identified] : some edema bilat at LE [de-identified] : in wheelchair

## 2024-03-08 NOTE — BH INPATIENT PSYCHIATRY PROGRESS NOTE - NSBHASSESSSUMMFT_PSY_ALL_CORE
2/5 Patient showing some improving trend will give more time, if needs more medication titration due to ongoing symptoms will increase Seroquel not haldol  2/6 Improving cont meds, offered prn suppository and simethicone  2/7 Slowly improving will cont current meds, eval need for further increase Seroquel  2/8 Partial response continue meds except will increase Seroquel as was on 250mg/d PTA  2/9 Overall gains, still regresses and becomes difficult to manage will increase Seroquel to 200mg/d in divided dosing  2/12 A little calmer since started on additional mid day, cont other meds w/o change  2/13 Patient better still disorganized, hyper Mormonism but less agitated, cont current treatment  2/14 Improved globally with some symptom variability. Cont current med consider further increment Seroquel  2/15 Improved cont current regimen for now  2/16 Showing  some gains continue treatment for now at current doses   2/20 Improved, cont current rx. Scheduled urogyn for pessary replacement  2/21 Improved with variable intrusiveness. Cont meds will increase Miralax and Senna  2/22 Improving cont current rx, observe for effect of re-titration of laxatives  2/23 Lost balance mainly due to behavior however to be safe will lower haldol and Klonopin modestly and observe. Otherwise generally manageable  2/24: No PRNs needed overnight. Compliant with meds. As per staff last night pt was banging her Bible against the wall and lost her balance, staff held her. Pt remains talkative and intrusive at times but overall calmer and redirectable.   2/25: remains labile, loud, easily agitated but more redirectable.  2/26 Patient seen by medicine no evidence injury needing imaging, just Tylenol prn. Will cont current meds, if anteroflexion dosesn't improve with recent drop in haldol will consider further dose reduction  2/27 Has had slow increase in EPS despite no increase in dose and some reduction will hold haldol to reduce EPS and restart at lower dose  2/28 Patient with change in med tolerance some agitation and sedation. Will hold haldol tonight consider changin to Prolixin as may be a little better for EPS and will check labs to look for any medical issue  2/29 Labile irritable, increased tremor improved with washout of haldol. Will restart haldol but see if she can be stabilized at low dose to minimize tremor. COuld consider increasing Seroquel  3/1 Improved will see if she can stabilize on Seroquel and low dose haldol to minimize EPS will address nasal symptoms  3/2 Patient better less bent tremor less calmer. Attempt to find dose of haldol that control psychosis agitation with manageable tremor. May need to try 2mg bid as 1mg big may be subtherapeutic  3/4 Some improvement in that less agitated but EPS better will cont with low dose haldol Issues is that both side effects and decompenstion have had long lags related to dose changes so unclear if at haldol 1mg bid she is better EPS wise but will gradually decompensate. Cont to observe consider increase to 1mg tid. Patient's 2PC expiring she remains delusional disorganized not yet able to managed in SNF will apply for further retention  3/5 Showing some imrpovement with less agitation despite delsuiosn but also less EPS with lowered haldol. Cont meds will reduce klonopin from 0.25mg tid to bid to reduce polypharm cont other meds. If calmer less disorganzed will ask PT if she can walker trial  3/6: behavior in better control, delusional, suspected AH. taking meds.   3/7: can become irritable, redirectable. c/w current meds, ambulating hunched over.   3/8: went to interventional Urology on Fri, UA ordered, ultrasound of bladder scheduled for Monday at 10 AM,  and ob gyn for pessary ordered. If blood in urine—send back to urology office for cysto (please see the full consult in chart)

## 2024-03-08 NOTE — BH INPATIENT PSYCHIATRY PROGRESS NOTE - NSBHMETABOLIC_PSY_ALL_CORE_FT
BMI: BMI (kg/m2): 30.2 (01-15-24 @ 12:52)  HbA1c: A1C with Estimated Average Glucose Result: 7.6 % (01-12-24 @ 12:40)    Glucose: POCT Blood Glucose.: 97 mg/dL (03-08-24 @ 08:21)    BP: --Vital Signs Last 24 Hrs  T(C): --  T(F): --  HR: --  BP: --  BP(mean): --  RR: --  SpO2: --    Orthostatic VS  03-08-24 @ 08:11  Lying BP: --/-- HR: --  Sitting BP: 152/92 HR: 86  Standing BP: --/-- HR: --  Site: --  Mode: --  Orthostatic VS  03-07-24 @ 11:04  Lying BP: --/-- HR: --  Sitting BP: 152/91 HR: 102  Standing BP: 145/99 HR: 96  Site: upper left arm  Mode: electronic    Lipid Panel:

## 2024-03-09 LAB
APPEARANCE UR: CLEAR — SIGNIFICANT CHANGE UP
BILIRUB UR-MCNC: NEGATIVE — SIGNIFICANT CHANGE UP
COLOR SPEC: YELLOW — SIGNIFICANT CHANGE UP
DIFF PNL FLD: NEGATIVE — SIGNIFICANT CHANGE UP
GLUCOSE UR QL: NEGATIVE MG/DL — SIGNIFICANT CHANGE UP
KETONES UR-MCNC: NEGATIVE MG/DL — SIGNIFICANT CHANGE UP
LEUKOCYTE ESTERASE UR-ACNC: ABNORMAL
NITRITE UR-MCNC: NEGATIVE — SIGNIFICANT CHANGE UP
PH UR: 6.5 — SIGNIFICANT CHANGE UP (ref 5–8)
PROT UR-MCNC: NEGATIVE MG/DL — SIGNIFICANT CHANGE UP
SP GR SPEC: 1.01 — SIGNIFICANT CHANGE UP (ref 1–1.03)
UROBILINOGEN FLD QL: 0.2 MG/DL — SIGNIFICANT CHANGE UP (ref 0.2–1)

## 2024-03-09 PROCEDURE — 99231 SBSQ HOSP IP/OBS SF/LOW 25: CPT

## 2024-03-09 RX ADMIN — CARBIDOPA AND LEVODOPA 1 TABLET(S): 25; 100 TABLET ORAL at 08:45

## 2024-03-09 RX ADMIN — CARBIDOPA AND LEVODOPA 1 TABLET(S): 25; 100 TABLET ORAL at 21:23

## 2024-03-09 RX ADMIN — DIVALPROEX SODIUM 500 MILLIGRAM(S): 500 TABLET, DELAYED RELEASE ORAL at 13:49

## 2024-03-09 RX ADMIN — METFORMIN HYDROCHLORIDE 500 MILLIGRAM(S): 850 TABLET ORAL at 21:24

## 2024-03-09 RX ADMIN — METFORMIN HYDROCHLORIDE 500 MILLIGRAM(S): 850 TABLET ORAL at 08:45

## 2024-03-09 RX ADMIN — VALACYCLOVIR 1000 MILLIGRAM(S): 500 TABLET, FILM COATED ORAL at 08:46

## 2024-03-09 RX ADMIN — QUETIAPINE FUMARATE 50 MILLIGRAM(S): 200 TABLET, FILM COATED ORAL at 13:49

## 2024-03-09 RX ADMIN — DIVALPROEX SODIUM 500 MILLIGRAM(S): 500 TABLET, DELAYED RELEASE ORAL at 08:43

## 2024-03-09 RX ADMIN — Medication 0.25 MILLIGRAM(S): at 21:23

## 2024-03-09 RX ADMIN — SENNA PLUS 2 TABLET(S): 8.6 TABLET ORAL at 21:22

## 2024-03-09 RX ADMIN — Medication 75 MICROGRAM(S): at 06:57

## 2024-03-09 RX ADMIN — HALOPERIDOL DECANOATE 1 MILLIGRAM(S): 100 INJECTION INTRAMUSCULAR at 21:24

## 2024-03-09 RX ADMIN — Medication 1 TABLET(S): at 21:23

## 2024-03-09 RX ADMIN — QUETIAPINE FUMARATE 50 MILLIGRAM(S): 200 TABLET, FILM COATED ORAL at 08:44

## 2024-03-09 RX ADMIN — CARBIDOPA AND LEVODOPA 1 TABLET(S): 25; 100 TABLET ORAL at 13:50

## 2024-03-09 RX ADMIN — QUETIAPINE FUMARATE 150 MILLIGRAM(S): 200 TABLET, FILM COATED ORAL at 21:24

## 2024-03-09 RX ADMIN — Medication 50 MILLIGRAM(S): at 08:46

## 2024-03-09 RX ADMIN — VALACYCLOVIR 1000 MILLIGRAM(S): 500 TABLET, FILM COATED ORAL at 21:23

## 2024-03-09 RX ADMIN — Medication 0.25 MILLIGRAM(S): at 08:44

## 2024-03-09 RX ADMIN — DIVALPROEX SODIUM 500 MILLIGRAM(S): 500 TABLET, DELAYED RELEASE ORAL at 21:24

## 2024-03-09 RX ADMIN — LISINOPRIL 20 MILLIGRAM(S): 2.5 TABLET ORAL at 08:45

## 2024-03-09 NOTE — BH INPATIENT PSYCHIATRY PROGRESS NOTE - NSBHMETABOLIC_PSY_ALL_CORE_FT
BMI: BMI (kg/m2): 30.2 (01-15-24 @ 12:52)  HbA1c: A1C with Estimated Average Glucose Result: 7.6 % (01-12-24 @ 12:40)    Glucose: POCT Blood Glucose.: 152 mg/dL (03-08-24 @ 16:37)    BP: --Vital Signs Last 24 Hrs  T(C): --  T(F): --  HR: --  BP: --  BP(mean): --  RR: --  SpO2: --    Orthostatic VS  03-08-24 @ 08:11  Lying BP: --/-- HR: --  Sitting BP: 152/92 HR: 86  Standing BP: --/-- HR: --  Site: --  Mode: --    Lipid Panel:

## 2024-03-09 NOTE — BH INPATIENT PSYCHIATRY PROGRESS NOTE - NSBHASSESSSUMMFT_PSY_ALL_CORE
2/5 Patient showing some improving trend will give more time, if needs more medication titration due to ongoing symptoms will increase Seroquel not haldol  2/6 Improving cont meds, offered prn suppository and simethicone  2/7 Slowly improving will cont current meds, eval need for further increase Seroquel  2/8 Partial response continue meds except will increase Seroquel as was on 250mg/d PTA  2/9 Overall gains, still regresses and becomes difficult to manage will increase Seroquel to 200mg/d in divided dosing  2/12 A little calmer since started on additional mid day, cont other meds w/o change  2/13 Patient better still disorganized, hyper Buddhist but less agitated, cont current treatment  2/14 Improved globally with some symptom variability. Cont current med consider further increment Seroquel  2/15 Improved cont current regimen for now  2/16 Showing  some gains continue treatment for now at current doses   2/20 Improved, cont current rx. Scheduled urogyn for pessary replacement  2/21 Improved with variable intrusiveness. Cont meds will increase Miralax and Senna  2/22 Improving cont current rx, observe for effect of re-titration of laxatives  2/23 Lost balance mainly due to behavior however to be safe will lower haldol and Klonopin modestly and observe. Otherwise generally manageable  2/24: No PRNs needed overnight. Compliant with meds. As per staff last night pt was banging her Bible against the wall and lost her balance, staff held her. Pt remains talkative and intrusive at times but overall calmer and redirectable.   2/25: remains labile, loud, easily agitated but more redirectable.  2/26 Patient seen by medicine no evidence injury needing imaging, just Tylenol prn. Will cont current meds, if anteroflexion dosesn't improve with recent drop in haldol will consider further dose reduction  2/27 Has had slow increase in EPS despite no increase in dose and some reduction will hold haldol to reduce EPS and restart at lower dose  2/28 Patient with change in med tolerance some agitation and sedation. Will hold haldol tonight consider changin to Prolixin as may be a little better for EPS and will check labs to look for any medical issue  2/29 Labile irritable, increased tremor improved with washout of haldol. Will restart haldol but see if she can be stabilized at low dose to minimize tremor. COuld consider increasing Seroquel  3/1 Improved will see if she can stabilize on Seroquel and low dose haldol to minimize EPS will address nasal symptoms  3/2 Patient better less bent tremor less calmer. Attempt to find dose of haldol that control psychosis agitation with manageable tremor. May need to try 2mg bid as 1mg big may be subtherapeutic  3/4 Some improvement in that less agitated but EPS better will cont with low dose haldol Issues is that both side effects and decompenstion have had long lags related to dose changes so unclear if at haldol 1mg bid she is better EPS wise but will gradually decompensate. Cont to observe consider increase to 1mg tid. Patient's 2PC expiring she remains delusional disorganized not yet able to managed in SNF will apply for further retention  3/5 Showing some imrpovement with less agitation despite delsuiosn but also less EPS with lowered haldol. Cont meds will reduce klonopin from 0.25mg tid to bid to reduce polypharm cont other meds. If calmer less disorganzed will ask PT if she can walker trial  3/6: behavior in better control, delusional, suspected AH. taking meds.   3/7: can become irritable, redirectable. c/w current meds, ambulating hunched over.   3/8: went to interventional Urology on Fri, UA ordered, ultrasound of bladder scheduled for Monday at 10 AM,  and ob gyn for pessary ordered. If blood in urine—send back to urology office for cysto (please see the full consult in chart)  3/9: Somewhat sedated this AM, calm, not agitated, will follow and adjust meds as necessary (clonazepam)

## 2024-03-09 NOTE — BH INPATIENT PSYCHIATRY PROGRESS NOTE - MSE UNSTRUCTURED FT
Awake but not entirely alert this AM. Decreased attention. Affect constricted. Speech under-productive, poorly articulated. No delusions expressed. Not tremor or movement disturbance. Poor insight. No expressed suicidal ideations.

## 2024-03-09 NOTE — BH INPATIENT PSYCHIATRY PROGRESS NOTE - NSBHCHARTREVIEWVS_PSY_A_CORE FT
Vital Signs Last 24 Hrs  T(C): --  T(F): --  HR: --  BP: --  BP(mean): --  RR: --  SpO2: --    Orthostatic VS  03-08-24 @ 08:11  Lying BP: --/-- HR: --  Sitting BP: 152/92 HR: 86  Standing BP: --/-- HR: --  Site: --  Mode: --

## 2024-03-10 LAB
GLUCOSE BLDC GLUCOMTR-MCNC: 149 MG/DL — HIGH (ref 70–99)
GLUCOSE BLDC GLUCOMTR-MCNC: 153 MG/DL — HIGH (ref 70–99)
GLUCOSE BLDC GLUCOMTR-MCNC: 204 MG/DL — HIGH (ref 70–99)

## 2024-03-10 PROCEDURE — 99231 SBSQ HOSP IP/OBS SF/LOW 25: CPT

## 2024-03-10 RX ADMIN — VALACYCLOVIR 1000 MILLIGRAM(S): 500 TABLET, FILM COATED ORAL at 21:17

## 2024-03-10 RX ADMIN — SENNA PLUS 2 TABLET(S): 8.6 TABLET ORAL at 21:16

## 2024-03-10 RX ADMIN — CARBIDOPA AND LEVODOPA 1 TABLET(S): 25; 100 TABLET ORAL at 13:59

## 2024-03-10 RX ADMIN — METFORMIN HYDROCHLORIDE 500 MILLIGRAM(S): 850 TABLET ORAL at 22:01

## 2024-03-10 RX ADMIN — CARBIDOPA AND LEVODOPA 1 TABLET(S): 25; 100 TABLET ORAL at 21:18

## 2024-03-10 RX ADMIN — VALACYCLOVIR 1000 MILLIGRAM(S): 500 TABLET, FILM COATED ORAL at 10:58

## 2024-03-10 RX ADMIN — LISINOPRIL 20 MILLIGRAM(S): 2.5 TABLET ORAL at 10:59

## 2024-03-10 RX ADMIN — Medication 50 MILLIGRAM(S): at 11:00

## 2024-03-10 RX ADMIN — HALOPERIDOL DECANOATE 1 MILLIGRAM(S): 100 INJECTION INTRAMUSCULAR at 21:17

## 2024-03-10 RX ADMIN — QUETIAPINE FUMARATE 50 MILLIGRAM(S): 200 TABLET, FILM COATED ORAL at 13:59

## 2024-03-10 RX ADMIN — Medication 1 APPLICATION(S): at 11:00

## 2024-03-10 RX ADMIN — Medication 75 MICROGRAM(S): at 06:53

## 2024-03-10 RX ADMIN — Medication 0.25 MILLIGRAM(S): at 11:00

## 2024-03-10 RX ADMIN — Medication 0.25 MILLIGRAM(S): at 21:17

## 2024-03-10 RX ADMIN — Medication 1 TABLET(S): at 22:01

## 2024-03-10 RX ADMIN — DIVALPROEX SODIUM 500 MILLIGRAM(S): 500 TABLET, DELAYED RELEASE ORAL at 14:00

## 2024-03-10 RX ADMIN — DIVALPROEX SODIUM 500 MILLIGRAM(S): 500 TABLET, DELAYED RELEASE ORAL at 21:16

## 2024-03-10 RX ADMIN — CARBIDOPA AND LEVODOPA 1 TABLET(S): 25; 100 TABLET ORAL at 11:00

## 2024-03-10 RX ADMIN — POLYETHYLENE GLYCOL 3350 17 GRAM(S): 17 POWDER, FOR SOLUTION ORAL at 10:58

## 2024-03-10 RX ADMIN — QUETIAPINE FUMARATE 50 MILLIGRAM(S): 200 TABLET, FILM COATED ORAL at 10:59

## 2024-03-10 RX ADMIN — QUETIAPINE FUMARATE 150 MILLIGRAM(S): 200 TABLET, FILM COATED ORAL at 21:18

## 2024-03-10 RX ADMIN — DIVALPROEX SODIUM 500 MILLIGRAM(S): 500 TABLET, DELAYED RELEASE ORAL at 10:58

## 2024-03-10 RX ADMIN — METFORMIN HYDROCHLORIDE 500 MILLIGRAM(S): 850 TABLET ORAL at 10:59

## 2024-03-10 NOTE — BH INPATIENT PSYCHIATRY PROGRESS NOTE - NSBHCHARTREVIEWVS_PSY_A_CORE FT
Vital Signs Last 24 Hrs  T(C): 36.4 (03-10-24 @ 07:58), Max: 36.4 (03-10-24 @ 07:58)  T(F): 97.5 (03-10-24 @ 07:58), Max: 97.5 (03-10-24 @ 07:58)  HR: --  BP: --  BP(mean): --  RR: --  SpO2: --    Orthostatic VS  03-10-24 @ 07:58  Lying BP: --/-- HR: --  Sitting BP: 140/82 HR: 88  Standing BP: 151/88 HR: 86  Site: --  Mode: --

## 2024-03-10 NOTE — BH INPATIENT PSYCHIATRY PROGRESS NOTE - NSBHASSESSSUMMFT_PSY_ALL_CORE
2/5 Patient showing some improving trend will give more time, if needs more medication titration due to ongoing symptoms will increase Seroquel not haldol  2/6 Improving cont meds, offered prn suppository and simethicone  2/7 Slowly improving will cont current meds, eval need for further increase Seroquel  2/8 Partial response continue meds except will increase Seroquel as was on 250mg/d PTA  2/9 Overall gains, still regresses and becomes difficult to manage will increase Seroquel to 200mg/d in divided dosing  2/12 A little calmer since started on additional mid day, cont other meds w/o change  2/13 Patient better still disorganized, hyper Anab[employer]ist but less agitated, cont current treatment  2/14 Improved globally with some symptom variability. Cont current med consider further increment Seroquel  2/15 Improved cont current regimen for now  2/16 Showing  some gains continue treatment for now at current doses   2/20 Improved, cont current rx. Scheduled urogyn for pessary replacement  2/21 Improved with variable intrusiveness. Cont meds will increase Miralax and Senna  2/22 Improving cont current rx, observe for effect of re-titration of laxatives  2/23 Lost balance mainly due to behavior however to be safe will lower haldol and Klonopin modestly and observe. Otherwise generally manageable  2/24: No PRNs needed overnight. Compliant with meds. As per staff last night pt was banging her Bible against the wall and lost her balance, staff held her. Pt remains talkative and intrusive at times but overall calmer and redirectable.   2/25: remains labile, loud, easily agitated but more redirectable.  2/26 Patient seen by medicine no evidence injury needing imaging, just Tylenol prn. Will cont current meds, if anteroflexion dosesn't improve with recent drop in haldol will consider further dose reduction  2/27 Has had slow increase in EPS despite no increase in dose and some reduction will hold haldol to reduce EPS and restart at lower dose  2/28 Patient with change in med tolerance some agitation and sedation. Will hold haldol tonight consider changin to Prolixin as may be a little better for EPS and will check labs to look for any medical issue  2/29 Labile irritable, increased tremor improved with washout of haldol. Will restart haldol but see if she can be stabilized at low dose to minimize tremor. COuld consider increasing Seroquel  3/1 Improved will see if she can stabilize on Seroquel and low dose haldol to minimize EPS will address nasal symptoms  3/2 Patient better less bent tremor less calmer. Attempt to find dose of haldol that control psychosis agitation with manageable tremor. May need to try 2mg bid as 1mg big may be subtherapeutic  3/4 Some improvement in that less agitated but EPS better will cont with low dose haldol Issues is that both side effects and decompenstion have had long lags related to dose changes so unclear if at haldol 1mg bid she is better EPS wise but will gradually decompensate. Cont to observe consider increase to 1mg tid. Patient's 2PC expiring she remains delusional disorganized not yet able to managed in SNF will apply for further retention  3/5 Showing some imrpovement with less agitation despite delsuiosn but also less EPS with lowered haldol. Cont meds will reduce klonopin from 0.25mg tid to bid to reduce polypharm cont other meds. If calmer less disorganzed will ask PT if she can walker trial  3/6: behavior in better control, delusional, suspected AH. taking meds.   3/7: can become irritable, redirectable. c/w current meds, ambulating hunched over.   3/8: went to interventional Urology on Fri, UA ordered, ultrasound of bladder scheduled for Monday at 10 AM,  and ob gyn for pessary ordered. If blood in urine—send back to urology office for cysto (please see the full consult in chart)  3/9: Somewhat sedated this AM, calm, not agitated, will follow and adjust meds as necessary (clonazepam)   3/10: More alert today, suspicious of examiner, UA negative

## 2024-03-10 NOTE — BH INPATIENT PSYCHIATRY PROGRESS NOTE - MSE UNSTRUCTURED FT
Awake and alert. Irritable affect. Fluent speech. Suspicious of MD, insists "you aren't a good doctor."  No tremor or movement disturbance. TP disjointed.  Poor insight. No expressed suicidal ideations.

## 2024-03-10 NOTE — BH INPATIENT PSYCHIATRY PROGRESS NOTE - NSBHMETABOLIC_PSY_ALL_CORE_FT
BMI: BMI (kg/m2): 30.2 (01-15-24 @ 12:52)  HbA1c: A1C with Estimated Average Glucose Result: 7.6 % (01-12-24 @ 12:40)    Glucose: POCT Blood Glucose.: 153 mg/dL (03-10-24 @ 11:58)    BP: --Vital Signs Last 24 Hrs  T(C): 36.4 (03-10-24 @ 07:58), Max: 36.4 (03-10-24 @ 07:58)  T(F): 97.5 (03-10-24 @ 07:58), Max: 97.5 (03-10-24 @ 07:58)  HR: --  BP: --  BP(mean): --  RR: --  SpO2: --    Orthostatic VS  03-10-24 @ 07:58  Lying BP: --/-- HR: --  Sitting BP: 140/82 HR: 88  Standing BP: 151/88 HR: 86  Site: --  Mode: --    Lipid Panel:

## 2024-03-10 NOTE — BH INPATIENT PSYCHIATRY PROGRESS NOTE - CURRENT MEDICATION
MEDICATIONS  (STANDING):  ammonium lactate 12% Lotion 1 Application(s) Topical two times a day  carbidopa/levodopa  25/100 1 Tablet(s) Oral three times a day  clonazePAM Oral Disintegrating Tablet 0.25 milliGRAM(s) Oral two times a day  dextrose 5%. 1000 milliLiter(s) (100 mL/Hr) IV Continuous <Continuous>  dextrose 5%. 1000 milliLiter(s) (50 mL/Hr) IV Continuous <Continuous>  dextrose 50% Injectable 25 Gram(s) IV Push once  dextrose 50% Injectable 12.5 Gram(s) IV Push once  dextrose 50% Injectable 25 Gram(s) IV Push once  divalproex Sprinkle 500 milliGRAM(s) Oral three times a day  glucagon  Injectable 1 milliGRAM(s) IntraMuscular once  haloperidol     Tablet 1 milliGRAM(s) Oral two times a day  hemorrhoidal Ointment 1 Application(s) Rectal daily  hydrocortisone 2.5% Ointment 1 Application(s) Topical two times a day  insulin lispro (ADMELOG) corrective regimen sliding scale   SubCutaneous three times a day before meals  levothyroxine 75 MICROGram(s) Oral daily  lisinopril 20 milliGRAM(s) Oral daily  metFORMIN 500 milliGRAM(s) Oral two times a day  metoprolol succinate ER 50 milliGRAM(s) Oral daily  multivitamin 1 Tablet(s) Oral at bedtime  polyethylene glycol 3350 17 Gram(s) Oral daily  QUEtiapine 50 milliGRAM(s) Oral <User Schedule>  QUEtiapine 150 milliGRAM(s) Oral at bedtime  senna 2 Tablet(s) Oral at bedtime  valACYclovir 1000 milliGRAM(s) Oral every 12 hours    MEDICATIONS  (PRN):  acetaminophen     Tablet .. 650 milliGRAM(s) Oral every 6 hours PRN Mild Pain (1 - 3)  acetaminophen     Tablet .. 650 milliGRAM(s) Oral every 6 hours PRN Mild Pain (1 - 3), Moderate Pain (4 - 6)  albuterol    90 MICROgram(s) HFA Inhaler 2 Puff(s) Inhalation every 6 hours PRN Shortness of Breath and/or Wheezing  aluminum hydroxide/magnesium hydroxide/simethicone Suspension 30 milliLiter(s) Oral every 6 hours PRN Dyspepsia  bisacodyl Suppository 10 milliGRAM(s) Rectal daily PRN constipation  dextrose Oral Gel 15 Gram(s) Oral once PRN Blood Glucose LESS THAN 70 milliGRAM(s)/deciliter  haloperidol    Injectable 2.5 milliGRAM(s) IntraMuscular once PRN Severe Agitation  QUEtiapine 50 milliGRAM(s) Oral every 6 hours PRN agitation  simethicone 80 milliGRAM(s) Chew every 8 hours PRN gas  sodium chloride 0.65% Nasal 2 Spray(s) Both Nostrils every 6 hours PRN congestion

## 2024-03-11 LAB
GLUCOSE BLDC GLUCOMTR-MCNC: 139 MG/DL — HIGH (ref 70–99)
GLUCOSE BLDC GLUCOMTR-MCNC: 99 MG/DL — SIGNIFICANT CHANGE UP (ref 70–99)

## 2024-03-11 PROCEDURE — 99231 SBSQ HOSP IP/OBS SF/LOW 25: CPT

## 2024-03-11 PROCEDURE — 76857 US EXAM PELVIC LIMITED: CPT | Mod: 26

## 2024-03-11 RX ADMIN — VALACYCLOVIR 1000 MILLIGRAM(S): 500 TABLET, FILM COATED ORAL at 21:52

## 2024-03-11 RX ADMIN — QUETIAPINE FUMARATE 150 MILLIGRAM(S): 200 TABLET, FILM COATED ORAL at 21:51

## 2024-03-11 RX ADMIN — HALOPERIDOL DECANOATE 1 MILLIGRAM(S): 100 INJECTION INTRAMUSCULAR at 08:25

## 2024-03-11 RX ADMIN — Medication 50 MILLIGRAM(S): at 08:24

## 2024-03-11 RX ADMIN — HALOPERIDOL DECANOATE 1 MILLIGRAM(S): 100 INJECTION INTRAMUSCULAR at 21:55

## 2024-03-11 RX ADMIN — METFORMIN HYDROCHLORIDE 500 MILLIGRAM(S): 850 TABLET ORAL at 21:50

## 2024-03-11 RX ADMIN — Medication 75 MICROGRAM(S): at 06:55

## 2024-03-11 RX ADMIN — DIVALPROEX SODIUM 500 MILLIGRAM(S): 500 TABLET, DELAYED RELEASE ORAL at 12:16

## 2024-03-11 RX ADMIN — QUETIAPINE FUMARATE 50 MILLIGRAM(S): 200 TABLET, FILM COATED ORAL at 08:22

## 2024-03-11 RX ADMIN — DIVALPROEX SODIUM 500 MILLIGRAM(S): 500 TABLET, DELAYED RELEASE ORAL at 08:22

## 2024-03-11 RX ADMIN — METFORMIN HYDROCHLORIDE 500 MILLIGRAM(S): 850 TABLET ORAL at 08:24

## 2024-03-11 RX ADMIN — CARBIDOPA AND LEVODOPA 1 TABLET(S): 25; 100 TABLET ORAL at 21:51

## 2024-03-11 RX ADMIN — QUETIAPINE FUMARATE 50 MILLIGRAM(S): 200 TABLET, FILM COATED ORAL at 12:17

## 2024-03-11 RX ADMIN — Medication 1 TABLET(S): at 21:51

## 2024-03-11 RX ADMIN — DIVALPROEX SODIUM 500 MILLIGRAM(S): 500 TABLET, DELAYED RELEASE ORAL at 21:50

## 2024-03-11 RX ADMIN — Medication 0.25 MILLIGRAM(S): at 08:23

## 2024-03-11 RX ADMIN — SENNA PLUS 2 TABLET(S): 8.6 TABLET ORAL at 21:50

## 2024-03-11 RX ADMIN — LISINOPRIL 20 MILLIGRAM(S): 2.5 TABLET ORAL at 08:23

## 2024-03-11 RX ADMIN — CARBIDOPA AND LEVODOPA 1 TABLET(S): 25; 100 TABLET ORAL at 08:24

## 2024-03-11 RX ADMIN — VALACYCLOVIR 1000 MILLIGRAM(S): 500 TABLET, FILM COATED ORAL at 08:25

## 2024-03-11 RX ADMIN — CARBIDOPA AND LEVODOPA 1 TABLET(S): 25; 100 TABLET ORAL at 12:16

## 2024-03-11 RX ADMIN — Medication 0.25 MILLIGRAM(S): at 21:51

## 2024-03-11 NOTE — BH INPATIENT PSYCHIATRY PROGRESS NOTE - NSBHCHARTREVIEWVS_PSY_A_CORE FT
Vital Signs Last 24 Hrs  T(C): --  T(F): --  HR: --  BP: --  BP(mean): --  RR: --  SpO2: --    Orthostatic VS  03-11-24 @ 08:25  Lying BP: --/-- HR: --  Sitting BP: 124/74 HR: 78  Standing BP: 134/80 HR: 82  Site: upper right arm  Mode: electronic  Orthostatic VS  03-10-24 @ 07:58  Lying BP: --/-- HR: --  Sitting BP: 140/82 HR: 88  Standing BP: 151/88 HR: 86  Site: --  Mode: --

## 2024-03-11 NOTE — BH INPATIENT PSYCHIATRY PROGRESS NOTE - NSBHFUPINTERVALHXFT_PSY_A_CORE
Per staff, patient was verbally hostile at times. Has been on edge, complaining about roommate stating she would "beat her up," accusing the person of stealing her ice cream. No prns given.

## 2024-03-11 NOTE — BH INPATIENT PSYCHIATRY PROGRESS NOTE - MSE UNSTRUCTURED FT
Appearance: casual grooming, no overt deficits in hygiene; has multiple crosses on the floor made from paper towels; mild tremulousness in UE  Behavior: cantankerous, dominates conversation, oddly related  Speech: wnl  Mood: irritable  Affect: congruent, stable, somewhat intense  Thought process: perseverative, overinclusive;   Thought content: mistrustfulness noted; denies SI/HI  Perceptual disturbances: no appearance of internal preoccupation  Cognition: adequately oriented  Insight: limited  Judgement: limited

## 2024-03-11 NOTE — BH INPATIENT PSYCHIATRY PROGRESS NOTE - NSBHMETABOLIC_PSY_ALL_CORE_FT
BMI: BMI (kg/m2): 30.2 (01-15-24 @ 12:52)  HbA1c: A1C with Estimated Average Glucose Result: 7.6 % (01-12-24 @ 12:40)    Glucose: POCT Blood Glucose.: 139 mg/dL (03-11-24 @ 17:01)    BP: --Vital Signs Last 24 Hrs  T(C): --  T(F): --  HR: --  BP: --  BP(mean): --  RR: --  SpO2: --    Orthostatic VS  03-11-24 @ 08:25  Lying BP: --/-- HR: --  Sitting BP: 124/74 HR: 78  Standing BP: 134/80 HR: 82  Site: upper right arm  Mode: electronic  Orthostatic VS  03-10-24 @ 07:58  Lying BP: --/-- HR: --  Sitting BP: 140/82 HR: 88  Standing BP: 151/88 HR: 86  Site: --  Mode: --    Lipid Panel:

## 2024-03-11 NOTE — BH INPATIENT PSYCHIATRY PROGRESS NOTE - NSBHASSESSSUMMFT_PSY_ALL_CORE

## 2024-03-12 LAB
GLUCOSE BLDC GLUCOMTR-MCNC: 143 MG/DL — HIGH (ref 70–99)
GLUCOSE BLDC GLUCOMTR-MCNC: 188 MG/DL — HIGH (ref 70–99)

## 2024-03-12 PROCEDURE — 99231 SBSQ HOSP IP/OBS SF/LOW 25: CPT

## 2024-03-12 RX ORDER — CLONAZEPAM 1 MG
0.25 TABLET ORAL
Refills: 0 | Status: DISCONTINUED | OUTPATIENT
Start: 2024-03-12 | End: 2024-03-18

## 2024-03-12 RX ADMIN — VALACYCLOVIR 1000 MILLIGRAM(S): 500 TABLET, FILM COATED ORAL at 08:14

## 2024-03-12 RX ADMIN — QUETIAPINE FUMARATE 50 MILLIGRAM(S): 200 TABLET, FILM COATED ORAL at 08:14

## 2024-03-12 RX ADMIN — METFORMIN HYDROCHLORIDE 500 MILLIGRAM(S): 850 TABLET ORAL at 21:44

## 2024-03-12 RX ADMIN — Medication 0.25 MILLIGRAM(S): at 21:44

## 2024-03-12 RX ADMIN — HALOPERIDOL DECANOATE 1 MILLIGRAM(S): 100 INJECTION INTRAMUSCULAR at 08:14

## 2024-03-12 RX ADMIN — CARBIDOPA AND LEVODOPA 1 TABLET(S): 25; 100 TABLET ORAL at 08:14

## 2024-03-12 RX ADMIN — DIVALPROEX SODIUM 500 MILLIGRAM(S): 500 TABLET, DELAYED RELEASE ORAL at 13:36

## 2024-03-12 RX ADMIN — METFORMIN HYDROCHLORIDE 500 MILLIGRAM(S): 850 TABLET ORAL at 08:14

## 2024-03-12 RX ADMIN — VALACYCLOVIR 1000 MILLIGRAM(S): 500 TABLET, FILM COATED ORAL at 21:43

## 2024-03-12 RX ADMIN — DIVALPROEX SODIUM 500 MILLIGRAM(S): 500 TABLET, DELAYED RELEASE ORAL at 08:14

## 2024-03-12 RX ADMIN — QUETIAPINE FUMARATE 150 MILLIGRAM(S): 200 TABLET, FILM COATED ORAL at 21:45

## 2024-03-12 RX ADMIN — Medication 1 TABLET(S): at 21:43

## 2024-03-12 RX ADMIN — CARBIDOPA AND LEVODOPA 1 TABLET(S): 25; 100 TABLET ORAL at 21:44

## 2024-03-12 RX ADMIN — HALOPERIDOL DECANOATE 1 MILLIGRAM(S): 100 INJECTION INTRAMUSCULAR at 21:43

## 2024-03-12 RX ADMIN — QUETIAPINE FUMARATE 50 MILLIGRAM(S): 200 TABLET, FILM COATED ORAL at 13:36

## 2024-03-12 RX ADMIN — Medication 0.25 MILLIGRAM(S): at 08:15

## 2024-03-12 RX ADMIN — CARBIDOPA AND LEVODOPA 1 TABLET(S): 25; 100 TABLET ORAL at 13:36

## 2024-03-12 RX ADMIN — LISINOPRIL 20 MILLIGRAM(S): 2.5 TABLET ORAL at 08:14

## 2024-03-12 RX ADMIN — Medication 75 MICROGRAM(S): at 07:32

## 2024-03-12 RX ADMIN — Medication 50 MILLIGRAM(S): at 08:14

## 2024-03-12 RX ADMIN — SENNA PLUS 2 TABLET(S): 8.6 TABLET ORAL at 21:44

## 2024-03-12 RX ADMIN — DIVALPROEX SODIUM 500 MILLIGRAM(S): 500 TABLET, DELAYED RELEASE ORAL at 21:46

## 2024-03-12 NOTE — BH INPATIENT PSYCHIATRY PROGRESS NOTE - NSBHFUPINTERVALHXFT_PSY_A_CORE
Per staff, patient was verbally hostile at times. Has been on edge, complaining about roommate stating she would "beat her up," accusing the person of stealing her ice cream. No prns given.  Pt is seen and evaluated for follow up for schizoaffective disorder.  Chart is reviewed and d/w treatment team.  Per staff, patient was verbally hostile at times. Has been irritable and difficulty tolerating roommate. No prns given.  Pt has been med adherant although resistant, denies AH/VH, denies SI/HI, poor insight and poor group engagement.  No EPS, no somatic complaints.

## 2024-03-12 NOTE — BH INPATIENT PSYCHIATRY PROGRESS NOTE - CURRENT MEDICATION
MEDICATIONS  (STANDING):  ammonium lactate 12% Lotion 1 Application(s) Topical two times a day  carbidopa/levodopa  25/100 1 Tablet(s) Oral three times a day  clonazePAM Oral Disintegrating Tablet 0.25 milliGRAM(s) Oral two times a day  dextrose 5%. 1000 milliLiter(s) (50 mL/Hr) IV Continuous <Continuous>  dextrose 5%. 1000 milliLiter(s) (100 mL/Hr) IV Continuous <Continuous>  dextrose 50% Injectable 25 Gram(s) IV Push once  dextrose 50% Injectable 12.5 Gram(s) IV Push once  dextrose 50% Injectable 25 Gram(s) IV Push once  divalproex Sprinkle 500 milliGRAM(s) Oral three times a day  glucagon  Injectable 1 milliGRAM(s) IntraMuscular once  haloperidol     Tablet 1 milliGRAM(s) Oral two times a day  hemorrhoidal Ointment 1 Application(s) Rectal daily  hydrocortisone 2.5% Ointment 1 Application(s) Topical two times a day  insulin lispro (ADMELOG) corrective regimen sliding scale   SubCutaneous three times a day before meals  levothyroxine 75 MICROGram(s) Oral daily  lisinopril 20 milliGRAM(s) Oral daily  metFORMIN 500 milliGRAM(s) Oral two times a day  metoprolol succinate ER 50 milliGRAM(s) Oral daily  multivitamin 1 Tablet(s) Oral at bedtime  polyethylene glycol 3350 17 Gram(s) Oral daily  QUEtiapine 50 milliGRAM(s) Oral <User Schedule>  QUEtiapine 150 milliGRAM(s) Oral at bedtime  senna 2 Tablet(s) Oral at bedtime  valACYclovir 1000 milliGRAM(s) Oral every 12 hours    MEDICATIONS  (PRN):  acetaminophen     Tablet .. 650 milliGRAM(s) Oral every 6 hours PRN Mild Pain (1 - 3)  acetaminophen     Tablet .. 650 milliGRAM(s) Oral every 6 hours PRN Mild Pain (1 - 3), Moderate Pain (4 - 6)  albuterol    90 MICROgram(s) HFA Inhaler 2 Puff(s) Inhalation every 6 hours PRN Shortness of Breath and/or Wheezing  aluminum hydroxide/magnesium hydroxide/simethicone Suspension 30 milliLiter(s) Oral every 6 hours PRN Dyspepsia  bisacodyl Suppository 10 milliGRAM(s) Rectal daily PRN constipation  dextrose Oral Gel 15 Gram(s) Oral once PRN Blood Glucose LESS THAN 70 milliGRAM(s)/deciliter  haloperidol    Injectable 2.5 milliGRAM(s) IntraMuscular once PRN Severe Agitation  QUEtiapine 50 milliGRAM(s) Oral every 6 hours PRN agitation  simethicone 80 milliGRAM(s) Chew every 8 hours PRN gas  sodium chloride 0.65% Nasal 2 Spray(s) Both Nostrils every 6 hours PRN congestion   MEDICATIONS  (STANDING):  ammonium lactate 12% Lotion 1 Application(s) Topical two times a day  carbidopa/levodopa  25/100 1 Tablet(s) Oral three times a day  clonazePAM Oral Disintegrating Tablet 0.25 milliGRAM(s) Oral two times a day  dextrose 5%. 1000 milliLiter(s) (100 mL/Hr) IV Continuous <Continuous>  dextrose 5%. 1000 milliLiter(s) (50 mL/Hr) IV Continuous <Continuous>  dextrose 50% Injectable 25 Gram(s) IV Push once  dextrose 50% Injectable 12.5 Gram(s) IV Push once  dextrose 50% Injectable 25 Gram(s) IV Push once  divalproex Sprinkle 500 milliGRAM(s) Oral three times a day  glucagon  Injectable 1 milliGRAM(s) IntraMuscular once  haloperidol     Tablet 1 milliGRAM(s) Oral two times a day  hemorrhoidal Ointment 1 Application(s) Rectal daily  hydrocortisone 2.5% Ointment 1 Application(s) Topical two times a day  insulin lispro (ADMELOG) corrective regimen sliding scale   SubCutaneous three times a day before meals  levothyroxine 75 MICROGram(s) Oral daily  lisinopril 20 milliGRAM(s) Oral daily  metFORMIN 500 milliGRAM(s) Oral two times a day  metoprolol succinate ER 50 milliGRAM(s) Oral daily  multivitamin 1 Tablet(s) Oral at bedtime  polyethylene glycol 3350 17 Gram(s) Oral daily  QUEtiapine 50 milliGRAM(s) Oral <User Schedule>  QUEtiapine 150 milliGRAM(s) Oral at bedtime  senna 2 Tablet(s) Oral at bedtime  valACYclovir 1000 milliGRAM(s) Oral every 12 hours    MEDICATIONS  (PRN):  acetaminophen     Tablet .. 650 milliGRAM(s) Oral every 6 hours PRN Mild Pain (1 - 3)  acetaminophen     Tablet .. 650 milliGRAM(s) Oral every 6 hours PRN Mild Pain (1 - 3), Moderate Pain (4 - 6)  albuterol    90 MICROgram(s) HFA Inhaler 2 Puff(s) Inhalation every 6 hours PRN Shortness of Breath and/or Wheezing  aluminum hydroxide/magnesium hydroxide/simethicone Suspension 30 milliLiter(s) Oral every 6 hours PRN Dyspepsia  bisacodyl Suppository 10 milliGRAM(s) Rectal daily PRN constipation  dextrose Oral Gel 15 Gram(s) Oral once PRN Blood Glucose LESS THAN 70 milliGRAM(s)/deciliter  haloperidol    Injectable 2.5 milliGRAM(s) IntraMuscular once PRN Severe Agitation  QUEtiapine 50 milliGRAM(s) Oral every 6 hours PRN agitation  simethicone 80 milliGRAM(s) Chew every 8 hours PRN gas  sodium chloride 0.65% Nasal 2 Spray(s) Both Nostrils every 6 hours PRN congestion

## 2024-03-12 NOTE — BH INPATIENT PSYCHIATRY PROGRESS NOTE - NSBHMETABOLIC_PSY_ALL_CORE_FT
BMI: BMI (kg/m2): 30.2 (01-15-24 @ 12:52)  HbA1c: A1C with Estimated Average Glucose Result: 7.6 % (01-12-24 @ 12:40)    Glucose: POCT Blood Glucose.: 143 mg/dL (03-12-24 @ 16:52)    BP: --Vital Signs Last 24 Hrs  T(C): 36.8 (03-12-24 @ 07:49), Max: 36.8 (03-12-24 @ 07:49)  T(F): 98.3 (03-12-24 @ 07:49), Max: 98.3 (03-12-24 @ 07:49)  HR: --  BP: --  BP(mean): --  RR: --  SpO2: --    Orthostatic VS  03-12-24 @ 07:49  Lying BP: --/-- HR: --  Sitting BP: 145/78 HR: --  Standing BP: 151/81 HR: --  Site: --  Mode: --  Orthostatic VS  03-11-24 @ 08:25  Lying BP: --/-- HR: --  Sitting BP: 124/74 HR: 78  Standing BP: 134/80 HR: 82  Site: upper right arm  Mode: electronic    Lipid Panel:  BMI: BMI (kg/m2): 30.2 (01-15-24 @ 12:52)  HbA1c: A1C with Estimated Average Glucose Result: 7.6 % (01-12-24 @ 12:40)    Glucose: POCT Blood Glucose.: 159 mg/dL (03-13-24 @ 16:08)    BP: --Vital Signs Last 24 Hrs  T(C): --  T(F): --  HR: --  BP: --  BP(mean): --  RR: --  SpO2: --    Orthostatic VS  03-13-24 @ 08:37  Lying BP: --/-- HR: --  Sitting BP: 141/73 HR: 76  Standing BP: 148/77 HR: 67  Site: --  Mode: --  Orthostatic VS  03-12-24 @ 07:49  Lying BP: --/-- HR: --  Sitting BP: 145/78 HR: --  Standing BP: 151/81 HR: --  Site: --  Mode: --    Lipid Panel:

## 2024-03-12 NOTE — BH INPATIENT PSYCHIATRY PROGRESS NOTE - NSBHASSESSSUMMFT_PSY_ALL_CORE

## 2024-03-12 NOTE — BH INPATIENT PSYCHIATRY PROGRESS NOTE - NSBHCHARTREVIEWVS_PSY_A_CORE FT
Vital Signs Last 24 Hrs  T(C): 36.8 (03-12-24 @ 07:49), Max: 36.8 (03-12-24 @ 07:49)  T(F): 98.3 (03-12-24 @ 07:49), Max: 98.3 (03-12-24 @ 07:49)  HR: --  BP: --  BP(mean): --  RR: --  SpO2: --    Orthostatic VS  03-12-24 @ 07:49  Lying BP: --/-- HR: --  Sitting BP: 145/78 HR: --  Standing BP: 151/81 HR: --  Site: --  Mode: --  Orthostatic VS  03-11-24 @ 08:25  Lying BP: --/-- HR: --  Sitting BP: 124/74 HR: 78  Standing BP: 134/80 HR: 82  Site: upper right arm  Mode: electronic   Vital Signs Last 24 Hrs  T(C): --  T(F): --  HR: --  BP: --  BP(mean): --  RR: --  SpO2: --    Orthostatic VS  03-13-24 @ 08:37  Lying BP: --/-- HR: --  Sitting BP: 141/73 HR: 76  Standing BP: 148/77 HR: 67  Site: --  Mode: --  Orthostatic VS  03-12-24 @ 07:49  Lying BP: --/-- HR: --  Sitting BP: 145/78 HR: --  Standing BP: 151/81 HR: --  Site: --  Mode: --

## 2024-03-13 LAB
GLUCOSE BLDC GLUCOMTR-MCNC: 114 MG/DL — HIGH (ref 70–99)
GLUCOSE BLDC GLUCOMTR-MCNC: 159 MG/DL — HIGH (ref 70–99)

## 2024-03-13 PROCEDURE — 99231 SBSQ HOSP IP/OBS SF/LOW 25: CPT

## 2024-03-13 RX ADMIN — Medication 0.25 MILLIGRAM(S): at 21:10

## 2024-03-13 RX ADMIN — QUETIAPINE FUMARATE 50 MILLIGRAM(S): 200 TABLET, FILM COATED ORAL at 08:35

## 2024-03-13 RX ADMIN — QUETIAPINE FUMARATE 50 MILLIGRAM(S): 200 TABLET, FILM COATED ORAL at 13:49

## 2024-03-13 RX ADMIN — DIVALPROEX SODIUM 500 MILLIGRAM(S): 500 TABLET, DELAYED RELEASE ORAL at 08:35

## 2024-03-13 RX ADMIN — QUETIAPINE FUMARATE 150 MILLIGRAM(S): 200 TABLET, FILM COATED ORAL at 21:10

## 2024-03-13 RX ADMIN — CARBIDOPA AND LEVODOPA 1 TABLET(S): 25; 100 TABLET ORAL at 21:11

## 2024-03-13 RX ADMIN — METFORMIN HYDROCHLORIDE 500 MILLIGRAM(S): 850 TABLET ORAL at 21:11

## 2024-03-13 RX ADMIN — VALACYCLOVIR 1000 MILLIGRAM(S): 500 TABLET, FILM COATED ORAL at 21:13

## 2024-03-13 RX ADMIN — Medication 1 TABLET(S): at 21:11

## 2024-03-13 RX ADMIN — HALOPERIDOL DECANOATE 1 MILLIGRAM(S): 100 INJECTION INTRAMUSCULAR at 08:35

## 2024-03-13 RX ADMIN — Medication 0.25 MILLIGRAM(S): at 08:36

## 2024-03-13 RX ADMIN — CARBIDOPA AND LEVODOPA 1 TABLET(S): 25; 100 TABLET ORAL at 13:49

## 2024-03-13 RX ADMIN — METFORMIN HYDROCHLORIDE 500 MILLIGRAM(S): 850 TABLET ORAL at 08:36

## 2024-03-13 RX ADMIN — Medication 75 MICROGRAM(S): at 05:41

## 2024-03-13 RX ADMIN — DIVALPROEX SODIUM 500 MILLIGRAM(S): 500 TABLET, DELAYED RELEASE ORAL at 21:12

## 2024-03-13 RX ADMIN — DIVALPROEX SODIUM 500 MILLIGRAM(S): 500 TABLET, DELAYED RELEASE ORAL at 13:48

## 2024-03-13 RX ADMIN — LISINOPRIL 20 MILLIGRAM(S): 2.5 TABLET ORAL at 08:35

## 2024-03-13 RX ADMIN — Medication 50 MILLIGRAM(S): at 08:36

## 2024-03-13 RX ADMIN — VALACYCLOVIR 1000 MILLIGRAM(S): 500 TABLET, FILM COATED ORAL at 08:36

## 2024-03-13 RX ADMIN — SENNA PLUS 2 TABLET(S): 8.6 TABLET ORAL at 21:11

## 2024-03-13 RX ADMIN — CARBIDOPA AND LEVODOPA 1 TABLET(S): 25; 100 TABLET ORAL at 08:36

## 2024-03-13 RX ADMIN — HALOPERIDOL DECANOATE 1 MILLIGRAM(S): 100 INJECTION INTRAMUSCULAR at 21:10

## 2024-03-13 NOTE — BH INPATIENT PSYCHIATRY PROGRESS NOTE - NSBHCHARTREVIEWVS_PSY_A_CORE FT
Vital Signs Last 24 Hrs  T(C): --  T(F): --  HR: --  BP: --  BP(mean): --  RR: --  SpO2: --    Orthostatic VS  03-13-24 @ 08:37  Lying BP: --/-- HR: --  Sitting BP: 141/73 HR: 76  Standing BP: 148/77 HR: 67  Site: --  Mode: --  Orthostatic VS  03-12-24 @ 07:49  Lying BP: --/-- HR: --  Sitting BP: 145/78 HR: --  Standing BP: 151/81 HR: --  Site: --  Mode: --   Vital Signs Last 24 Hrs  T(C): --  T(F): --  HR: 89 (03-15-24 @ 10:04) (89 - 89)  BP: 135/72 (03-15-24 @ 10:04) (135/72 - 135/72)  BP(mean): --  RR: --  SpO2: --

## 2024-03-13 NOTE — BH INPATIENT PSYCHIATRY PROGRESS NOTE - NSBHMETABOLIC_PSY_ALL_CORE_FT
BMI: BMI (kg/m2): 30.2 (01-15-24 @ 12:52)  HbA1c: A1C with Estimated Average Glucose Result: 7.6 % (01-12-24 @ 12:40)    Glucose: POCT Blood Glucose.: 159 mg/dL (03-13-24 @ 16:08)    BP: --Vital Signs Last 24 Hrs  T(C): --  T(F): --  HR: --  BP: --  BP(mean): --  RR: --  SpO2: --    Orthostatic VS  03-13-24 @ 08:37  Lying BP: --/-- HR: --  Sitting BP: 141/73 HR: 76  Standing BP: 148/77 HR: 67  Site: --  Mode: --  Orthostatic VS  03-12-24 @ 07:49  Lying BP: --/-- HR: --  Sitting BP: 145/78 HR: --  Standing BP: 151/81 HR: --  Site: --  Mode: --    Lipid Panel:  BMI: BMI (kg/m2): 30.2 (01-15-24 @ 12:52)  HbA1c: A1C with Estimated Average Glucose Result: 7.6 % (01-12-24 @ 12:40)    Glucose: POCT Blood Glucose.: 124 mg/dL (03-15-24 @ 08:46)    BP: 135/72 (03-15-24 @ 10:04) (135/72 - 135/72)Vital Signs Last 24 Hrs  T(C): --  T(F): --  HR: 89 (03-15-24 @ 10:04) (89 - 89)  BP: 135/72 (03-15-24 @ 10:04) (135/72 - 135/72)  BP(mean): --  RR: --  SpO2: --      Lipid Panel:

## 2024-03-13 NOTE — BH INPATIENT PSYCHIATRY PROGRESS NOTE - NSBHFUPINTERVALHXFT_PSY_A_CORE
Pt is seen and evaluated for follow up for schizoaffective disorder.  Chart is reviewed and d/w treatment team.  Per staff, patient was verbally hostile at times. Has been irritable and difficulty tolerating roommate. No prns given.  Pt has been med adherant although resistant, denies AH/VH, denies SI/HI, poor insight and poor group engagement.  No EPS, no somatic complaints.  Pt is seen and evaluated for follow up for schizoaffective disorder.  Chart is reviewed and d/w treatment team.  Per staff, patient was verbally hostile at times. Has been irritable and difficulty tolerating roommates. No prns given.  Pt has been med adherant although resistant, denies AH/VH, denies SI/HI, poor insight and poor group engagement.  No EPS, no somatic complaints. Delusional content expressed of having been injected with insulin in her neck in the past, suspiciousness noted.

## 2024-03-13 NOTE — BH INPATIENT PSYCHIATRY PROGRESS NOTE - CURRENT MEDICATION
MEDICATIONS  (STANDING):  ammonium lactate 12% Lotion 1 Application(s) Topical two times a day  carbidopa/levodopa  25/100 1 Tablet(s) Oral three times a day  clonazePAM Oral Disintegrating Tablet 0.25 milliGRAM(s) Oral two times a day  dextrose 5%. 1000 milliLiter(s) (50 mL/Hr) IV Continuous <Continuous>  dextrose 5%. 1000 milliLiter(s) (100 mL/Hr) IV Continuous <Continuous>  dextrose 50% Injectable 25 Gram(s) IV Push once  dextrose 50% Injectable 12.5 Gram(s) IV Push once  dextrose 50% Injectable 25 Gram(s) IV Push once  divalproex Sprinkle 500 milliGRAM(s) Oral three times a day  glucagon  Injectable 1 milliGRAM(s) IntraMuscular once  haloperidol     Tablet 1 milliGRAM(s) Oral two times a day  hemorrhoidal Ointment 1 Application(s) Rectal daily  hydrocortisone 2.5% Ointment 1 Application(s) Topical two times a day  insulin lispro (ADMELOG) corrective regimen sliding scale   SubCutaneous three times a day before meals  levothyroxine 75 MICROGram(s) Oral daily  lisinopril 20 milliGRAM(s) Oral daily  metFORMIN 500 milliGRAM(s) Oral two times a day  metoprolol succinate ER 50 milliGRAM(s) Oral daily  multivitamin 1 Tablet(s) Oral at bedtime  polyethylene glycol 3350 17 Gram(s) Oral daily  QUEtiapine 50 milliGRAM(s) Oral <User Schedule>  QUEtiapine 150 milliGRAM(s) Oral at bedtime  senna 2 Tablet(s) Oral at bedtime  valACYclovir 1000 milliGRAM(s) Oral every 12 hours    MEDICATIONS  (PRN):  acetaminophen     Tablet .. 650 milliGRAM(s) Oral every 6 hours PRN Mild Pain (1 - 3)  acetaminophen     Tablet .. 650 milliGRAM(s) Oral every 6 hours PRN Mild Pain (1 - 3), Moderate Pain (4 - 6)  albuterol    90 MICROgram(s) HFA Inhaler 2 Puff(s) Inhalation every 6 hours PRN Shortness of Breath and/or Wheezing  aluminum hydroxide/magnesium hydroxide/simethicone Suspension 30 milliLiter(s) Oral every 6 hours PRN Dyspepsia  bisacodyl Suppository 10 milliGRAM(s) Rectal daily PRN constipation  dextrose Oral Gel 15 Gram(s) Oral once PRN Blood Glucose LESS THAN 70 milliGRAM(s)/deciliter  haloperidol    Injectable 2.5 milliGRAM(s) IntraMuscular once PRN Severe Agitation  QUEtiapine 50 milliGRAM(s) Oral every 6 hours PRN agitation  simethicone 80 milliGRAM(s) Chew every 8 hours PRN gas  sodium chloride 0.65% Nasal 2 Spray(s) Both Nostrils every 6 hours PRN congestion   MEDICATIONS  (STANDING):  ammonium lactate 12% Lotion 1 Application(s) Topical two times a day  carbidopa/levodopa  25/100 1 Tablet(s) Oral three times a day  clonazePAM Oral Disintegrating Tablet 0.25 milliGRAM(s) Oral two times a day  dextrose 5%. 1000 milliLiter(s) (50 mL/Hr) IV Continuous <Continuous>  dextrose 5%. 1000 milliLiter(s) (100 mL/Hr) IV Continuous <Continuous>  dextrose 50% Injectable 25 Gram(s) IV Push once  dextrose 50% Injectable 25 Gram(s) IV Push once  dextrose 50% Injectable 12.5 Gram(s) IV Push once  divalproex Sprinkle 500 milliGRAM(s) Oral three times a day  glucagon  Injectable 1 milliGRAM(s) IntraMuscular once  haloperidol     Tablet 1 milliGRAM(s) Oral two times a day  hemorrhoidal Ointment 1 Application(s) Rectal daily  hydrocortisone 2.5% Ointment 1 Application(s) Topical two times a day  insulin lispro (ADMELOG) corrective regimen sliding scale   SubCutaneous three times a day before meals  levothyroxine 75 MICROGram(s) Oral daily  lisinopril 20 milliGRAM(s) Oral daily  metFORMIN 500 milliGRAM(s) Oral two times a day  metoprolol succinate ER 50 milliGRAM(s) Oral daily  multivitamin 1 Tablet(s) Oral at bedtime  polyethylene glycol 3350 17 Gram(s) Oral daily  QUEtiapine 50 milliGRAM(s) Oral <User Schedule>  QUEtiapine 150 milliGRAM(s) Oral at bedtime  senna 2 Tablet(s) Oral at bedtime  valACYclovir 1000 milliGRAM(s) Oral every 12 hours    MEDICATIONS  (PRN):  acetaminophen     Tablet .. 650 milliGRAM(s) Oral every 6 hours PRN Mild Pain (1 - 3)  acetaminophen     Tablet .. 650 milliGRAM(s) Oral every 6 hours PRN Mild Pain (1 - 3), Moderate Pain (4 - 6)  albuterol    90 MICROgram(s) HFA Inhaler 2 Puff(s) Inhalation every 6 hours PRN Shortness of Breath and/or Wheezing  aluminum hydroxide/magnesium hydroxide/simethicone Suspension 30 milliLiter(s) Oral every 6 hours PRN Dyspepsia  bisacodyl Suppository 10 milliGRAM(s) Rectal daily PRN constipation  dextrose Oral Gel 15 Gram(s) Oral once PRN Blood Glucose LESS THAN 70 milliGRAM(s)/deciliter  haloperidol    Injectable 2.5 milliGRAM(s) IntraMuscular once PRN Severe Agitation  QUEtiapine 50 milliGRAM(s) Oral every 6 hours PRN agitation  simethicone 80 milliGRAM(s) Chew every 8 hours PRN gas  sodium chloride 0.65% Nasal 2 Spray(s) Both Nostrils every 6 hours PRN congestion

## 2024-03-13 NOTE — BH INPATIENT PSYCHIATRY PROGRESS NOTE - NSBHASSESSSUMMFT_PSY_ALL_CORE

## 2024-03-14 LAB
GLUCOSE BLDC GLUCOMTR-MCNC: 129 MG/DL — HIGH (ref 70–99)
GLUCOSE BLDC GLUCOMTR-MCNC: 146 MG/DL — HIGH (ref 70–99)
GLUCOSE BLDC GLUCOMTR-MCNC: 178 MG/DL — HIGH (ref 70–99)

## 2024-03-14 RX ADMIN — QUETIAPINE FUMARATE 50 MILLIGRAM(S): 200 TABLET, FILM COATED ORAL at 09:24

## 2024-03-14 RX ADMIN — Medication 0.25 MILLIGRAM(S): at 21:18

## 2024-03-14 RX ADMIN — POLYETHYLENE GLYCOL 3350 17 GRAM(S): 17 POWDER, FOR SOLUTION ORAL at 09:17

## 2024-03-14 RX ADMIN — DIVALPROEX SODIUM 500 MILLIGRAM(S): 500 TABLET, DELAYED RELEASE ORAL at 21:19

## 2024-03-14 RX ADMIN — Medication 50 MILLIGRAM(S): at 09:17

## 2024-03-14 RX ADMIN — METFORMIN HYDROCHLORIDE 500 MILLIGRAM(S): 850 TABLET ORAL at 09:17

## 2024-03-14 RX ADMIN — Medication 75 MICROGRAM(S): at 05:21

## 2024-03-14 RX ADMIN — CARBIDOPA AND LEVODOPA 1 TABLET(S): 25; 100 TABLET ORAL at 21:19

## 2024-03-14 RX ADMIN — SENNA PLUS 2 TABLET(S): 8.6 TABLET ORAL at 21:18

## 2024-03-14 RX ADMIN — QUETIAPINE FUMARATE 150 MILLIGRAM(S): 200 TABLET, FILM COATED ORAL at 21:19

## 2024-03-14 RX ADMIN — CARBIDOPA AND LEVODOPA 1 TABLET(S): 25; 100 TABLET ORAL at 09:17

## 2024-03-14 RX ADMIN — HALOPERIDOL DECANOATE 1 MILLIGRAM(S): 100 INJECTION INTRAMUSCULAR at 09:17

## 2024-03-14 RX ADMIN — VALACYCLOVIR 1000 MILLIGRAM(S): 500 TABLET, FILM COATED ORAL at 09:17

## 2024-03-14 RX ADMIN — Medication 0.25 MILLIGRAM(S): at 09:17

## 2024-03-14 RX ADMIN — VALACYCLOVIR 1000 MILLIGRAM(S): 500 TABLET, FILM COATED ORAL at 21:19

## 2024-03-14 RX ADMIN — LISINOPRIL 20 MILLIGRAM(S): 2.5 TABLET ORAL at 09:17

## 2024-03-14 RX ADMIN — METFORMIN HYDROCHLORIDE 500 MILLIGRAM(S): 850 TABLET ORAL at 21:18

## 2024-03-14 RX ADMIN — DIVALPROEX SODIUM 500 MILLIGRAM(S): 500 TABLET, DELAYED RELEASE ORAL at 09:17

## 2024-03-14 RX ADMIN — Medication 1 TABLET(S): at 21:18

## 2024-03-14 NOTE — BH INPATIENT PSYCHIATRY PROGRESS NOTE - NSBHMETABOLIC_PSY_ALL_CORE_FT
BMI: BMI (kg/m2): 30.2 (01-15-24 @ 12:52)  HbA1c: A1C with Estimated Average Glucose Result: 7.6 % (01-12-24 @ 12:40)    Glucose: POCT Blood Glucose.: 124 mg/dL (03-15-24 @ 08:46)    BP: 135/72 (03-15-24 @ 10:04) (135/72 - 135/72)Vital Signs Last 24 Hrs  T(C): --  T(F): --  HR: 89 (03-15-24 @ 10:04) (89 - 89)  BP: 135/72 (03-15-24 @ 10:04) (135/72 - 135/72)  BP(mean): --  RR: --  SpO2: --      Lipid Panel:

## 2024-03-14 NOTE — BH INPATIENT PSYCHIATRY PROGRESS NOTE - NSBHCHARTREVIEWVS_PSY_A_CORE FT
Vital Signs Last 24 Hrs  T(C): --  T(F): --  HR: 89 (03-15-24 @ 10:04) (89 - 89)  BP: 135/72 (03-15-24 @ 10:04) (135/72 - 135/72)  BP(mean): --  RR: --  SpO2: --

## 2024-03-14 NOTE — BH INPATIENT PSYCHIATRY PROGRESS NOTE - NSBHASSESSSUMMFT_PSY_ALL_CORE

## 2024-03-14 NOTE — BH INPATIENT PSYCHIATRY PROGRESS NOTE - NSBHFUPINTERVALHXFT_PSY_A_CORE
Pt is seen and evaluated for follow up for schizoaffective disorder.  Chart is reviewed and d/w treatment team.  Per staff, patient has been verbally hostile at times. Has been irritable and difficulty tolerating roommates, irritable with this writer on exam. No prns given.  Pt has been med adherant although resistant, denies AH/VH, denies SI/HI, poor insight and poor group engagement.  No EPS, no somatic complaints.

## 2024-03-15 LAB
GLUCOSE BLDC GLUCOMTR-MCNC: 124 MG/DL — HIGH (ref 70–99)
GLUCOSE BLDC GLUCOMTR-MCNC: 136 MG/DL — HIGH (ref 70–99)

## 2024-03-15 PROCEDURE — 99231 SBSQ HOSP IP/OBS SF/LOW 25: CPT

## 2024-03-15 RX ADMIN — CARBIDOPA AND LEVODOPA 1 TABLET(S): 25; 100 TABLET ORAL at 21:18

## 2024-03-15 RX ADMIN — QUETIAPINE FUMARATE 50 MILLIGRAM(S): 200 TABLET, FILM COATED ORAL at 09:35

## 2024-03-15 RX ADMIN — VALACYCLOVIR 1000 MILLIGRAM(S): 500 TABLET, FILM COATED ORAL at 21:15

## 2024-03-15 RX ADMIN — VALACYCLOVIR 1000 MILLIGRAM(S): 500 TABLET, FILM COATED ORAL at 09:35

## 2024-03-15 RX ADMIN — QUETIAPINE FUMARATE 50 MILLIGRAM(S): 200 TABLET, FILM COATED ORAL at 13:08

## 2024-03-15 RX ADMIN — CARBIDOPA AND LEVODOPA 1 TABLET(S): 25; 100 TABLET ORAL at 09:35

## 2024-03-15 RX ADMIN — Medication 75 MICROGRAM(S): at 06:44

## 2024-03-15 RX ADMIN — Medication 50 MILLIGRAM(S): at 09:35

## 2024-03-15 RX ADMIN — METFORMIN HYDROCHLORIDE 500 MILLIGRAM(S): 850 TABLET ORAL at 21:16

## 2024-03-15 RX ADMIN — POLYETHYLENE GLYCOL 3350 17 GRAM(S): 17 POWDER, FOR SOLUTION ORAL at 09:35

## 2024-03-15 RX ADMIN — DIVALPROEX SODIUM 500 MILLIGRAM(S): 500 TABLET, DELAYED RELEASE ORAL at 09:34

## 2024-03-15 RX ADMIN — DIVALPROEX SODIUM 500 MILLIGRAM(S): 500 TABLET, DELAYED RELEASE ORAL at 21:16

## 2024-03-15 RX ADMIN — Medication 1 TABLET(S): at 21:18

## 2024-03-15 RX ADMIN — Medication 1 APPLICATION(S): at 10:59

## 2024-03-15 RX ADMIN — Medication 0.25 MILLIGRAM(S): at 21:16

## 2024-03-15 RX ADMIN — METFORMIN HYDROCHLORIDE 500 MILLIGRAM(S): 850 TABLET ORAL at 09:35

## 2024-03-15 RX ADMIN — HALOPERIDOL DECANOATE 1 MILLIGRAM(S): 100 INJECTION INTRAMUSCULAR at 21:18

## 2024-03-15 RX ADMIN — SENNA PLUS 2 TABLET(S): 8.6 TABLET ORAL at 21:16

## 2024-03-15 RX ADMIN — LISINOPRIL 20 MILLIGRAM(S): 2.5 TABLET ORAL at 09:35

## 2024-03-15 RX ADMIN — CARBIDOPA AND LEVODOPA 1 TABLET(S): 25; 100 TABLET ORAL at 13:08

## 2024-03-15 RX ADMIN — DIVALPROEX SODIUM 500 MILLIGRAM(S): 500 TABLET, DELAYED RELEASE ORAL at 13:08

## 2024-03-15 RX ADMIN — Medication 0.25 MILLIGRAM(S): at 09:35

## 2024-03-15 RX ADMIN — QUETIAPINE FUMARATE 150 MILLIGRAM(S): 200 TABLET, FILM COATED ORAL at 21:17

## 2024-03-15 NOTE — BH INPATIENT PSYCHIATRY PROGRESS NOTE - NSBHCHARTREVIEWVS_PSY_A_CORE FT
Vital Signs Last 24 Hrs  T(C): --  T(F): --  HR: 89 (03-15-24 @ 10:04) (89 - 89)  BP: 135/72 (03-15-24 @ 10:04) (135/72 - 135/72)  BP(mean): --  RR: --  SpO2: --     Vital Signs Last 24 Hrs  T(C): 36.8 (03-17-24 @ 07:55), Max: 36.8 (03-17-24 @ 07:55)  T(F): 98.2 (03-17-24 @ 07:55), Max: 98.2 (03-17-24 @ 07:55)  HR: --  BP: --  BP(mean): --  RR: --  SpO2: --    Orthostatic VS  03-17-24 @ 07:55  Lying BP: --/-- HR: --  Sitting BP: 151/86 HR: 86  Standing BP: --/-- HR: --  Site: --  Mode: --

## 2024-03-15 NOTE — BH INPATIENT PSYCHIATRY PROGRESS NOTE - NSBHFUPINTERVALHXFT_PSY_A_CORE
Pt is seen and evaluated for follow up for schizoaffective disorder.  Chart is reviewed and d/w treatment team.  Per staff, patient has been verbally hostile at times. Has been irritable and difficulty tolerating roommates, irritable with this writer on exam. No prns given.  Pt has been med adherant although resistant, denies AH/VH, denies SI/HI, poor insight and poor group engagement.  No EPS, no somatic complaints.  Pt is seen and evaluated for follow up for schizoaffective disorder.  Chart is reviewed and d/w treatment team.  Per staff, patient has been verbally hostile at times. Has been irritable and dismissive on approach, irritable with this writer on exam. No prns given.  Pt has been med adherant although resistant, denies AH/VH, denies SI/HI, poor insight and poor group engagement.  No EPS, no somatic complaints.

## 2024-03-15 NOTE — BH INPATIENT PSYCHIATRY PROGRESS NOTE - NSBHASSESSSUMMFT_PSY_ALL_CORE
2/5 Patient showing some improving trend will give more time, if needs more medication titration due to ongoing symptoms will increase Seroquel not haldol  2/6 Improving cont meds, offered prn suppository and simethicone  2/7 Slowly improving will cont current meds, eval need for further increase Seroquel  2/8 Partial response continue meds except will increase Seroquel as was on 250mg/d PTA  2/9 Overall gains, still regresses and becomes difficult to manage will increase Seroquel to 200mg/d in divided dosing  2/12 A little calmer since started on additional mid day, cont other meds w/o change  2/13 Patient better still disorganized, hyper Jehovah's witness but less agitated, cont current treatment  2/14 Improved globally with some symptom variability. Cont current med consider further increment Seroquel  2/15 Improved cont current regimen for now  2/16 Showing  some gains continue treatment for now at current doses   2/20 Improved, cont current rx. Scheduled urogyn for pessary replacement  2/21 Improved with variable intrusiveness. Cont meds will increase Miralax and Senna  2/22 Improving cont current rx, observe for effect of re-titration of laxatives  2/23 Lost balance mainly due to behavior however to be safe will lower haldol and Klonopin modestly and observe. Otherwise generally manageable  2/24: No PRNs needed overnight. Compliant with meds. As per staff last night pt was banging her Bible against the wall and lost her balance, staff held her. Pt remains talkative and intrusive at times but overall calmer and redirectable.   2/25: remains labile, loud, easily agitated but more redirectable.  2/26 Patient seen by medicine no evidence injury needing imaging, just Tylenol prn. Will cont current meds, if anteroflexion dosesn't improve with recent drop in haldol will consider further dose reduction  2/27 Has had slow increase in EPS despite no increase in dose and some reduction will hold haldol to reduce EPS and restart at lower dose  2/28 Patient with change in med tolerance some agitation and sedation. Will hold haldol tonight consider changing to Prolixin as may be a little better for EPS and will check labs to look for any medical issue  2/29 Labile irritable, increased tremor improved with washout of haldol. Will restart haldol but see if she can be stabilized at low dose to minimize tremor. COuld consider increasing Seroquel  3/1 Improved will see if she can stabilize on Seroquel and low dose haldol to minimize EPS will address nasal symptoms  3/2 Patient better less bent tremor less calmer. Attempt to find dose of haldol that control psychosis agitation with manageable tremor. May need to try 2mg bid as 1mg big may be subtherapeutic  3/4 Some improvement in that less agitated but EPS better will cont with low dose haldol Issues is that both side effects and decompenstion have had long lags related to dose changes so unclear if at haldol 1mg bid she is better EPS wise but will gradually decompensate. Cont to observe consider increase to 1mg tid. Patient's 2PC expiring she remains delusional disorganized not yet able to managed in SNF will apply for further retention  3/5 Showing some improvement with less agitation despite delusions but also less EPS with lowered haldol. Cont meds will reduce klonopin from 0.25mg tid to bid to reduce polypharm cont other meds. If calmer less disorganized will ask PT if she can walker trial  3/6: behavior in better control, delusional, suspected AH. taking meds.   3/7: can become irritable, redirectable. c/w current meds, ambulating hunched over.   3/8: went to interventional Urology on Fri, UA ordered, ultrasound of bladder scheduled for Monday at 10 AM,  and ob gyn for pessary ordered. If blood in urine—send back to urology office for cysto (please see the full consult in chart)  3/9: Somewhat sedated this AM, calm, not agitated, will follow and adjust meds as necessary (clonazepam)   3/10: More alert today, suspicious of examiner, UA negative  3/11: Mistrustfulness remains, leading to irritability with staff; no prns given   3/12: Labile and irritability noted, poor insight and requires continued inpatient psychiatric hospitalization for safety and stabilization.  3/13: labile, irritable on approach, redirectable. c/w current meds. Delusions noted on exam.  3/14: Labile and irritability noted, poor insight and requires continued inpatient psychiatric hospitalization for safety and stabilization. 2/5 Patient showing some improving trend will give more time, if needs more medication titration due to ongoing symptoms will increase Seroquel not haldol  2/6 Improving cont meds, offered prn suppository and simethicone  2/7 Slowly improving will cont current meds, eval need for further increase Seroquel  2/8 Partial response continue meds except will increase Seroquel as was on 250mg/d PTA  2/9 Overall gains, still regresses and becomes difficult to manage will increase Seroquel to 200mg/d in divided dosing  2/12 A little calmer since started on additional mid day, cont other meds w/o change  2/13 Patient better still disorganized, hyper Tenriism but less agitated, cont current treatment  2/14 Improved globally with some symptom variability. Cont current med consider further increment Seroquel  2/15 Improved cont current regimen for now  2/16 Showing  some gains continue treatment for now at current doses   2/20 Improved, cont current rx. Scheduled urogyn for pessary replacement  2/21 Improved with variable intrusiveness. Cont meds will increase Miralax and Senna  2/22 Improving cont current rx, observe for effect of re-titration of laxatives  2/23 Lost balance mainly due to behavior however to be safe will lower haldol and Klonopin modestly and observe. Otherwise generally manageable  2/24: No PRNs needed overnight. Compliant with meds. As per staff last night pt was banging her Bible against the wall and lost her balance, staff held her. Pt remains talkative and intrusive at times but overall calmer and redirectable.   2/25: remains labile, loud, easily agitated but more redirectable.  2/26 Patient seen by medicine no evidence injury needing imaging, just Tylenol prn. Will cont current meds, if anteroflexion dosesn't improve with recent drop in haldol will consider further dose reduction  2/27 Has had slow increase in EPS despite no increase in dose and some reduction will hold haldol to reduce EPS and restart at lower dose  2/28 Patient with change in med tolerance some agitation and sedation. Will hold haldol tonight consider changing to Prolixin as may be a little better for EPS and will check labs to look for any medical issue  2/29 Labile irritable, increased tremor improved with washout of haldol. Will restart haldol but see if she can be stabilized at low dose to minimize tremor. COuld consider increasing Seroquel  3/1 Improved will see if she can stabilize on Seroquel and low dose haldol to minimize EPS will address nasal symptoms  3/2 Patient better less bent tremor less calmer. Attempt to find dose of haldol that control psychosis agitation with manageable tremor. May need to try 2mg bid as 1mg big may be subtherapeutic  3/4 Some improvement in that less agitated but EPS better will cont with low dose haldol Issues is that both side effects and decompenstion have had long lags related to dose changes so unclear if at haldol 1mg bid she is better EPS wise but will gradually decompensate. Cont to observe consider increase to 1mg tid. Patient's 2PC expiring she remains delusional disorganized not yet able to managed in SNF will apply for further retention  3/5 Showing some improvement with less agitation despite delusions but also less EPS with lowered haldol. Cont meds will reduce klonopin from 0.25mg tid to bid to reduce polypharm cont other meds. If calmer less disorganized will ask PT if she can walker trial  3/6: behavior in better control, delusional, suspected AH. taking meds.   3/7: can become irritable, redirectable. c/w current meds, ambulating hunched over.   3/8: went to interventional Urology on Fri, UA ordered, ultrasound of bladder scheduled for Monday at 10 AM,  and ob gyn for pessary ordered. If blood in urine—send back to urology office for cysto (please see the full consult in chart)  3/9: Somewhat sedated this AM, calm, not agitated, will follow and adjust meds as necessary (clonazepam)   3/10: More alert today, suspicious of examiner, UA negative  3/11: Mistrustfulness remains, leading to irritability with staff; no prns given   3/12: Labile and irritability noted, poor insight and requires continued inpatient psychiatric hospitalization for safety and stabilization.  3/13: labile, irritable on approach, redirectable. c/w current meds. Delusions noted on exam.  3/14: Labile and irritability noted, poor insight and requires continued inpatient psychiatric hospitalization for safety and stabilization.  3/15: labile, irritable on approach, redirectable. c/w current meds.

## 2024-03-15 NOTE — BH INPATIENT PSYCHIATRY PROGRESS NOTE - NSBHMETABOLIC_PSY_ALL_CORE_FT
BMI: BMI (kg/m2): 30.2 (01-15-24 @ 12:52)  HbA1c: A1C with Estimated Average Glucose Result: 7.6 % (01-12-24 @ 12:40)    Glucose: POCT Blood Glucose.: 136 mg/dL (03-15-24 @ 16:21)    BP: 135/72 (03-15-24 @ 10:04) (135/72 - 135/72)Vital Signs Last 24 Hrs  T(C): --  T(F): --  HR: 89 (03-15-24 @ 10:04) (89 - 89)  BP: 135/72 (03-15-24 @ 10:04) (135/72 - 135/72)  BP(mean): --  RR: --  SpO2: --      Lipid Panel:  BMI: BMI (kg/m2): 30.2 (01-15-24 @ 12:52)  HbA1c: A1C with Estimated Average Glucose Result: 7.6 % (01-12-24 @ 12:40)    Glucose: POCT Blood Glucose.: 171 mg/dL (03-17-24 @ 12:31)    BP: 135/72 (03-15-24 @ 10:04) (135/72 - 135/72)Vital Signs Last 24 Hrs  T(C): 36.8 (03-17-24 @ 07:55), Max: 36.8 (03-17-24 @ 07:55)  T(F): 98.2 (03-17-24 @ 07:55), Max: 98.2 (03-17-24 @ 07:55)  HR: --  BP: --  BP(mean): --  RR: --  SpO2: --    Orthostatic VS  03-17-24 @ 07:55  Lying BP: --/-- HR: --  Sitting BP: 151/86 HR: 86  Standing BP: --/-- HR: --  Site: --  Mode: --    Lipid Panel:

## 2024-03-16 LAB — GLUCOSE BLDC GLUCOMTR-MCNC: 117 MG/DL — HIGH (ref 70–99)

## 2024-03-16 RX ADMIN — CARBIDOPA AND LEVODOPA 1 TABLET(S): 25; 100 TABLET ORAL at 13:19

## 2024-03-16 RX ADMIN — QUETIAPINE FUMARATE 50 MILLIGRAM(S): 200 TABLET, FILM COATED ORAL at 09:21

## 2024-03-16 RX ADMIN — HALOPERIDOL DECANOATE 1 MILLIGRAM(S): 100 INJECTION INTRAMUSCULAR at 20:44

## 2024-03-16 RX ADMIN — HALOPERIDOL DECANOATE 1 MILLIGRAM(S): 100 INJECTION INTRAMUSCULAR at 09:15

## 2024-03-16 RX ADMIN — LISINOPRIL 20 MILLIGRAM(S): 2.5 TABLET ORAL at 09:15

## 2024-03-16 RX ADMIN — DIVALPROEX SODIUM 500 MILLIGRAM(S): 500 TABLET, DELAYED RELEASE ORAL at 13:20

## 2024-03-16 RX ADMIN — Medication 1 TABLET(S): at 20:45

## 2024-03-16 RX ADMIN — Medication 1 APPLICATION(S): at 09:17

## 2024-03-16 RX ADMIN — VALACYCLOVIR 1000 MILLIGRAM(S): 500 TABLET, FILM COATED ORAL at 20:45

## 2024-03-16 RX ADMIN — QUETIAPINE FUMARATE 50 MILLIGRAM(S): 200 TABLET, FILM COATED ORAL at 13:19

## 2024-03-16 RX ADMIN — SENNA PLUS 2 TABLET(S): 8.6 TABLET ORAL at 20:43

## 2024-03-16 RX ADMIN — CARBIDOPA AND LEVODOPA 1 TABLET(S): 25; 100 TABLET ORAL at 09:15

## 2024-03-16 RX ADMIN — Medication 50 MILLIGRAM(S): at 09:15

## 2024-03-16 RX ADMIN — METFORMIN HYDROCHLORIDE 500 MILLIGRAM(S): 850 TABLET ORAL at 20:44

## 2024-03-16 RX ADMIN — METFORMIN HYDROCHLORIDE 500 MILLIGRAM(S): 850 TABLET ORAL at 09:15

## 2024-03-16 RX ADMIN — Medication 75 MICROGRAM(S): at 06:24

## 2024-03-16 RX ADMIN — DIVALPROEX SODIUM 500 MILLIGRAM(S): 500 TABLET, DELAYED RELEASE ORAL at 20:43

## 2024-03-16 RX ADMIN — QUETIAPINE FUMARATE 150 MILLIGRAM(S): 200 TABLET, FILM COATED ORAL at 20:44

## 2024-03-16 RX ADMIN — POLYETHYLENE GLYCOL 3350 17 GRAM(S): 17 POWDER, FOR SOLUTION ORAL at 09:17

## 2024-03-16 RX ADMIN — VALACYCLOVIR 1000 MILLIGRAM(S): 500 TABLET, FILM COATED ORAL at 09:16

## 2024-03-16 RX ADMIN — CARBIDOPA AND LEVODOPA 1 TABLET(S): 25; 100 TABLET ORAL at 20:43

## 2024-03-16 RX ADMIN — DIVALPROEX SODIUM 500 MILLIGRAM(S): 500 TABLET, DELAYED RELEASE ORAL at 09:15

## 2024-03-16 RX ADMIN — Medication 0.25 MILLIGRAM(S): at 09:16

## 2024-03-16 RX ADMIN — PHENYLEPHRINE-SHARK LIVER OIL-MINERAL OIL-PETROLATUM RECTAL OINTMENT 1 APPLICATION(S): at 09:17

## 2024-03-16 RX ADMIN — Medication 0.25 MILLIGRAM(S): at 20:44

## 2024-03-16 RX ADMIN — Medication 1 APPLICATION(S): at 09:21

## 2024-03-17 LAB — GLUCOSE BLDC GLUCOMTR-MCNC: 171 MG/DL — HIGH (ref 70–99)

## 2024-03-17 RX ORDER — HALOPERIDOL DECANOATE 100 MG/ML
2.5 INJECTION INTRAMUSCULAR ONCE
Refills: 0 | Status: COMPLETED | OUTPATIENT
Start: 2024-03-17 | End: 2024-03-17

## 2024-03-17 RX ORDER — HALOPERIDOL DECANOATE 100 MG/ML
1 INJECTION INTRAMUSCULAR ONCE
Refills: 0 | Status: COMPLETED | OUTPATIENT
Start: 2024-03-17 | End: 2024-03-17

## 2024-03-17 RX ADMIN — CARBIDOPA AND LEVODOPA 1 TABLET(S): 25; 100 TABLET ORAL at 22:09

## 2024-03-17 RX ADMIN — Medication 0.25 MILLIGRAM(S): at 22:09

## 2024-03-17 RX ADMIN — HALOPERIDOL DECANOATE 1 MILLIGRAM(S): 100 INJECTION INTRAMUSCULAR at 01:58

## 2024-03-17 RX ADMIN — SENNA PLUS 2 TABLET(S): 8.6 TABLET ORAL at 22:09

## 2024-03-17 RX ADMIN — VALACYCLOVIR 1000 MILLIGRAM(S): 500 TABLET, FILM COATED ORAL at 22:10

## 2024-03-17 RX ADMIN — QUETIAPINE FUMARATE 150 MILLIGRAM(S): 200 TABLET, FILM COATED ORAL at 22:11

## 2024-03-17 RX ADMIN — QUETIAPINE FUMARATE 50 MILLIGRAM(S): 200 TABLET, FILM COATED ORAL at 17:17

## 2024-03-17 RX ADMIN — HALOPERIDOL DECANOATE 2.5 MILLIGRAM(S): 100 INJECTION INTRAMUSCULAR at 08:35

## 2024-03-17 RX ADMIN — DIVALPROEX SODIUM 500 MILLIGRAM(S): 500 TABLET, DELAYED RELEASE ORAL at 22:08

## 2024-03-17 RX ADMIN — Medication 1 MILLIGRAM(S): at 08:35

## 2024-03-17 RX ADMIN — Medication 1 TABLET(S): at 22:10

## 2024-03-17 RX ADMIN — HALOPERIDOL DECANOATE 1 MILLIGRAM(S): 100 INJECTION INTRAMUSCULAR at 22:09

## 2024-03-17 RX ADMIN — METFORMIN HYDROCHLORIDE 500 MILLIGRAM(S): 850 TABLET ORAL at 22:10

## 2024-03-18 LAB
GLUCOSE BLDC GLUCOMTR-MCNC: 189 MG/DL — HIGH (ref 70–99)
GLUCOSE BLDC GLUCOMTR-MCNC: 199 MG/DL — HIGH (ref 70–99)

## 2024-03-18 PROCEDURE — 99232 SBSQ HOSP IP/OBS MODERATE 35: CPT

## 2024-03-18 RX ORDER — HALOPERIDOL DECANOATE 100 MG/ML
1 INJECTION INTRAMUSCULAR ONCE
Refills: 0 | Status: DISCONTINUED | OUTPATIENT
Start: 2024-03-18 | End: 2024-03-20

## 2024-03-18 RX ORDER — CLONAZEPAM 1 MG
0.25 TABLET ORAL
Refills: 0 | Status: DISCONTINUED | OUTPATIENT
Start: 2024-03-18 | End: 2024-03-21

## 2024-03-18 RX ADMIN — Medication 0.25 MILLIGRAM(S): at 21:02

## 2024-03-18 RX ADMIN — SENNA PLUS 2 TABLET(S): 8.6 TABLET ORAL at 21:03

## 2024-03-18 RX ADMIN — CARBIDOPA AND LEVODOPA 1 TABLET(S): 25; 100 TABLET ORAL at 12:56

## 2024-03-18 RX ADMIN — VALACYCLOVIR 1000 MILLIGRAM(S): 500 TABLET, FILM COATED ORAL at 21:02

## 2024-03-18 RX ADMIN — DIVALPROEX SODIUM 500 MILLIGRAM(S): 500 TABLET, DELAYED RELEASE ORAL at 08:44

## 2024-03-18 RX ADMIN — METFORMIN HYDROCHLORIDE 500 MILLIGRAM(S): 850 TABLET ORAL at 21:02

## 2024-03-18 RX ADMIN — DIVALPROEX SODIUM 500 MILLIGRAM(S): 500 TABLET, DELAYED RELEASE ORAL at 12:56

## 2024-03-18 RX ADMIN — CARBIDOPA AND LEVODOPA 1 TABLET(S): 25; 100 TABLET ORAL at 08:45

## 2024-03-18 RX ADMIN — LISINOPRIL 20 MILLIGRAM(S): 2.5 TABLET ORAL at 08:45

## 2024-03-18 RX ADMIN — QUETIAPINE FUMARATE 50 MILLIGRAM(S): 200 TABLET, FILM COATED ORAL at 12:57

## 2024-03-18 RX ADMIN — QUETIAPINE FUMARATE 150 MILLIGRAM(S): 200 TABLET, FILM COATED ORAL at 21:01

## 2024-03-18 RX ADMIN — Medication 50 MILLIGRAM(S): at 08:44

## 2024-03-18 RX ADMIN — QUETIAPINE FUMARATE 50 MILLIGRAM(S): 200 TABLET, FILM COATED ORAL at 08:45

## 2024-03-18 RX ADMIN — CARBIDOPA AND LEVODOPA 1 TABLET(S): 25; 100 TABLET ORAL at 21:02

## 2024-03-18 RX ADMIN — Medication 1 TABLET(S): at 21:02

## 2024-03-18 RX ADMIN — METFORMIN HYDROCHLORIDE 500 MILLIGRAM(S): 850 TABLET ORAL at 08:45

## 2024-03-18 RX ADMIN — HALOPERIDOL DECANOATE 1 MILLIGRAM(S): 100 INJECTION INTRAMUSCULAR at 08:44

## 2024-03-18 RX ADMIN — DIVALPROEX SODIUM 500 MILLIGRAM(S): 500 TABLET, DELAYED RELEASE ORAL at 21:02

## 2024-03-18 RX ADMIN — VALACYCLOVIR 1000 MILLIGRAM(S): 500 TABLET, FILM COATED ORAL at 08:44

## 2024-03-18 RX ADMIN — Medication 0.25 MILLIGRAM(S): at 08:45

## 2024-03-18 NOTE — BH INPATIENT PSYCHIATRY PROGRESS NOTE - NSBHASSESSSUMMFT_PSY_ALL_CORE

## 2024-03-18 NOTE — BH INPATIENT PSYCHIATRY PROGRESS NOTE - CURRENT MEDICATION
MEDICATIONS  (STANDING):  ammonium lactate 12% Lotion 1 Application(s) Topical two times a day  carbidopa/levodopa  25/100 1 Tablet(s) Oral three times a day  clonazePAM Oral Disintegrating Tablet 0.25 milliGRAM(s) Oral two times a day  dextrose 5%. 1000 milliLiter(s) (50 mL/Hr) IV Continuous <Continuous>  dextrose 5%. 1000 milliLiter(s) (100 mL/Hr) IV Continuous <Continuous>  dextrose 50% Injectable 25 Gram(s) IV Push once  dextrose 50% Injectable 12.5 Gram(s) IV Push once  dextrose 50% Injectable 25 Gram(s) IV Push once  divalproex Sprinkle 500 milliGRAM(s) Oral three times a day  glucagon  Injectable 1 milliGRAM(s) IntraMuscular once  hemorrhoidal Ointment 1 Application(s) Rectal daily  hydrocortisone 2.5% Ointment 1 Application(s) Topical two times a day  insulin lispro (ADMELOG) corrective regimen sliding scale   SubCutaneous three times a day before meals  levothyroxine 75 MICROGram(s) Oral daily  lisinopril 20 milliGRAM(s) Oral daily  metFORMIN 500 milliGRAM(s) Oral two times a day  metoprolol succinate ER 50 milliGRAM(s) Oral daily  multivitamin 1 Tablet(s) Oral at bedtime  polyethylene glycol 3350 17 Gram(s) Oral daily  QUEtiapine 50 milliGRAM(s) Oral <User Schedule>  QUEtiapine 150 milliGRAM(s) Oral at bedtime  senna 2 Tablet(s) Oral at bedtime  valACYclovir 1000 milliGRAM(s) Oral every 12 hours    MEDICATIONS  (PRN):  acetaminophen     Tablet .. 650 milliGRAM(s) Oral every 6 hours PRN Mild Pain (1 - 3)  acetaminophen     Tablet .. 650 milliGRAM(s) Oral every 6 hours PRN Mild Pain (1 - 3), Moderate Pain (4 - 6)  albuterol    90 MICROgram(s) HFA Inhaler 2 Puff(s) Inhalation every 6 hours PRN Shortness of Breath and/or Wheezing  aluminum hydroxide/magnesium hydroxide/simethicone Suspension 30 milliLiter(s) Oral every 6 hours PRN Dyspepsia  bisacodyl Suppository 10 milliGRAM(s) Rectal daily PRN constipation  dextrose Oral Gel 15 Gram(s) Oral once PRN Blood Glucose LESS THAN 70 milliGRAM(s)/deciliter  haloperidol    Injectable 1 milliGRAM(s) IntraMuscular once PRN Severe Agitation  QUEtiapine 50 milliGRAM(s) Oral every 6 hours PRN agitation  simethicone 80 milliGRAM(s) Chew every 8 hours PRN gas  sodium chloride 0.65% Nasal 2 Spray(s) Both Nostrils every 6 hours PRN congestion
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
## 2024-03-18 NOTE — BH INPATIENT PSYCHIATRY PROGRESS NOTE - NSBHFUPINTERVALHXFT_PSY_A_CORE
Pt is seen and evaluated for follow up for schizoaffective disorder.Agitated yesterday got IM ogf haldol  and has been sedated during day but wearing off. VSS Wating sleeping okay

## 2024-03-18 NOTE — BH INPATIENT PSYCHIATRY PROGRESS NOTE - NSBHCHARTREVIEWVS_PSY_A_CORE FT
Vital Signs Last 24 Hrs  T(C): --  T(F): --  HR: --  BP: --  BP(mean): --  RR: --  SpO2: --    Orthostatic VS  03-18-24 @ 08:30  Lying BP: --/-- HR: --  Sitting BP: 152/80 HR: 95  Standing BP: 162/86 HR: 97  Site: --  Mode: --  Orthostatic VS  03-17-24 @ 07:55  Lying BP: --/-- HR: --  Sitting BP: 151/86 HR: 86  Standing BP: --/-- HR: --  Site: --  Mode: --

## 2024-03-18 NOTE — BH INPATIENT PSYCHIATRY PROGRESS NOTE - NSBHMETABOLIC_PSY_ALL_CORE_FT
BMI: BMI (kg/m2): 30.2 (01-15-24 @ 12:52)  HbA1c: A1C with Estimated Average Glucose Result: 7.6 % (01-12-24 @ 12:40)    Glucose: POCT Blood Glucose.: 189 mg/dL (03-18-24 @ 12:17)    BP: --Vital Signs Last 24 Hrs  T(C): --  T(F): --  HR: --  BP: --  BP(mean): --  RR: --  SpO2: --    Orthostatic VS  03-18-24 @ 08:30  Lying BP: --/-- HR: --  Sitting BP: 152/80 HR: 95  Standing BP: 162/86 HR: 97  Site: --  Mode: --  Orthostatic VS  03-17-24 @ 07:55  Lying BP: --/-- HR: --  Sitting BP: 151/86 HR: 86  Standing BP: --/-- HR: --  Site: --  Mode: --    Lipid Panel:

## 2024-03-18 NOTE — BH INPATIENT PSYCHIATRY PROGRESS NOTE - PRN MEDS
MEDICATIONS  (PRN):  acetaminophen     Tablet .. 650 milliGRAM(s) Oral every 6 hours PRN Mild Pain (1 - 3)  acetaminophen     Tablet .. 650 milliGRAM(s) Oral every 6 hours PRN Mild Pain (1 - 3), Moderate Pain (4 - 6)  albuterol    90 MICROgram(s) HFA Inhaler 2 Puff(s) Inhalation every 6 hours PRN Shortness of Breath and/or Wheezing  aluminum hydroxide/magnesium hydroxide/simethicone Suspension 30 milliLiter(s) Oral every 6 hours PRN Dyspepsia  bisacodyl Suppository 10 milliGRAM(s) Rectal daily PRN constipation  dextrose Oral Gel 15 Gram(s) Oral once PRN Blood Glucose LESS THAN 70 milliGRAM(s)/deciliter  haloperidol    Injectable 1 milliGRAM(s) IntraMuscular once PRN Severe Agitation  QUEtiapine 50 milliGRAM(s) Oral every 6 hours PRN agitation  simethicone 80 milliGRAM(s) Chew every 8 hours PRN gas  sodium chloride 0.65% Nasal 2 Spray(s) Both Nostrils every 6 hours PRN congestion

## 2024-03-19 LAB — GLUCOSE BLDC GLUCOMTR-MCNC: 162 MG/DL — HIGH (ref 70–99)

## 2024-03-19 PROCEDURE — 99232 SBSQ HOSP IP/OBS MODERATE 35: CPT

## 2024-03-19 RX ADMIN — Medication 0.25 MILLIGRAM(S): at 21:13

## 2024-03-19 RX ADMIN — CARBIDOPA AND LEVODOPA 1 TABLET(S): 25; 100 TABLET ORAL at 12:45

## 2024-03-19 RX ADMIN — SENNA PLUS 2 TABLET(S): 8.6 TABLET ORAL at 21:13

## 2024-03-19 RX ADMIN — CARBIDOPA AND LEVODOPA 1 TABLET(S): 25; 100 TABLET ORAL at 08:08

## 2024-03-19 RX ADMIN — Medication 1 TABLET(S): at 21:13

## 2024-03-19 RX ADMIN — DIVALPROEX SODIUM 500 MILLIGRAM(S): 500 TABLET, DELAYED RELEASE ORAL at 08:08

## 2024-03-19 RX ADMIN — QUETIAPINE FUMARATE 50 MILLIGRAM(S): 200 TABLET, FILM COATED ORAL at 08:08

## 2024-03-19 RX ADMIN — CARBIDOPA AND LEVODOPA 1 TABLET(S): 25; 100 TABLET ORAL at 21:14

## 2024-03-19 RX ADMIN — Medication 75 MICROGRAM(S): at 07:12

## 2024-03-19 RX ADMIN — DIVALPROEX SODIUM 500 MILLIGRAM(S): 500 TABLET, DELAYED RELEASE ORAL at 12:45

## 2024-03-19 RX ADMIN — VALACYCLOVIR 1000 MILLIGRAM(S): 500 TABLET, FILM COATED ORAL at 08:08

## 2024-03-19 RX ADMIN — QUETIAPINE FUMARATE 50 MILLIGRAM(S): 200 TABLET, FILM COATED ORAL at 12:45

## 2024-03-19 RX ADMIN — QUETIAPINE FUMARATE 150 MILLIGRAM(S): 200 TABLET, FILM COATED ORAL at 21:14

## 2024-03-19 RX ADMIN — LISINOPRIL 20 MILLIGRAM(S): 2.5 TABLET ORAL at 08:08

## 2024-03-19 RX ADMIN — VALACYCLOVIR 1000 MILLIGRAM(S): 500 TABLET, FILM COATED ORAL at 21:14

## 2024-03-19 RX ADMIN — DIVALPROEX SODIUM 500 MILLIGRAM(S): 500 TABLET, DELAYED RELEASE ORAL at 21:13

## 2024-03-19 RX ADMIN — METFORMIN HYDROCHLORIDE 500 MILLIGRAM(S): 850 TABLET ORAL at 21:14

## 2024-03-19 RX ADMIN — Medication 0.25 MILLIGRAM(S): at 08:08

## 2024-03-19 RX ADMIN — METFORMIN HYDROCHLORIDE 500 MILLIGRAM(S): 850 TABLET ORAL at 08:08

## 2024-03-19 RX ADMIN — Medication 50 MILLIGRAM(S): at 08:08

## 2024-03-19 NOTE — BH INPATIENT PSYCHIATRY PROGRESS NOTE - MSE UNSTRUCTURED FT
Appearance: casual grooming, no overt deficits in hygiene;  sig tremor not rigid. Some sedation  Behavior: poorly related dismissive  Speech: wnl  Mood: irritable  Affect: congruent, stable, somewhat intense  Thought process: impoverished  Thought content: mistrustfulness noted; denies SI/HI. [employer]ism delusions and pre-occupation  Perceptual disturbances: no appearance of internal preoccupation  Cognition: adequately oriented  Insight: limited  Judgement: limited

## 2024-03-19 NOTE — BH INPATIENT PSYCHIATRY PROGRESS NOTE - CURRENT MEDICATION
MEDICATIONS  (STANDING):  ammonium lactate 12% Lotion 1 Application(s) Topical two times a day  carbidopa/levodopa  25/100 1 Tablet(s) Oral three times a day  clonazePAM Oral Disintegrating Tablet 0.25 milliGRAM(s) Oral two times a day  dextrose 5%. 1000 milliLiter(s) (50 mL/Hr) IV Continuous <Continuous>  dextrose 5%. 1000 milliLiter(s) (100 mL/Hr) IV Continuous <Continuous>  dextrose 50% Injectable 25 Gram(s) IV Push once  dextrose 50% Injectable 12.5 Gram(s) IV Push once  dextrose 50% Injectable 25 Gram(s) IV Push once  divalproex Sprinkle 500 milliGRAM(s) Oral three times a day  glucagon  Injectable 1 milliGRAM(s) IntraMuscular once  hemorrhoidal Ointment 1 Application(s) Rectal daily  hydrocortisone 2.5% Ointment 1 Application(s) Topical two times a day  insulin lispro (ADMELOG) corrective regimen sliding scale   SubCutaneous three times a day before meals  levothyroxine 75 MICROGram(s) Oral daily  lisinopril 20 milliGRAM(s) Oral daily  metFORMIN 500 milliGRAM(s) Oral two times a day  metoprolol succinate ER 50 milliGRAM(s) Oral daily  multivitamin 1 Tablet(s) Oral at bedtime  polyethylene glycol 3350 17 Gram(s) Oral daily  QUEtiapine 150 milliGRAM(s) Oral at bedtime  QUEtiapine 50 milliGRAM(s) Oral <User Schedule>  senna 2 Tablet(s) Oral at bedtime  valACYclovir 1000 milliGRAM(s) Oral every 12 hours    MEDICATIONS  (PRN):  acetaminophen     Tablet .. 650 milliGRAM(s) Oral every 6 hours PRN Mild Pain (1 - 3)  acetaminophen     Tablet .. 650 milliGRAM(s) Oral every 6 hours PRN Mild Pain (1 - 3), Moderate Pain (4 - 6)  albuterol    90 MICROgram(s) HFA Inhaler 2 Puff(s) Inhalation every 6 hours PRN Shortness of Breath and/or Wheezing  aluminum hydroxide/magnesium hydroxide/simethicone Suspension 30 milliLiter(s) Oral every 6 hours PRN Dyspepsia  bisacodyl Suppository 10 milliGRAM(s) Rectal daily PRN constipation  dextrose Oral Gel 15 Gram(s) Oral once PRN Blood Glucose LESS THAN 70 milliGRAM(s)/deciliter  haloperidol    Injectable 1 milliGRAM(s) IntraMuscular once PRN Severe Agitation  QUEtiapine 50 milliGRAM(s) Oral every 6 hours PRN agitation  simethicone 80 milliGRAM(s) Chew every 8 hours PRN gas  sodium chloride 0.65% Nasal 2 Spray(s) Both Nostrils every 6 hours PRN congestion

## 2024-03-19 NOTE — BH INPATIENT PSYCHIATRY PROGRESS NOTE - NSBHMETABOLIC_PSY_ALL_CORE_FT
BMI: BMI (kg/m2): 30.2 (01-15-24 @ 12:52)  HbA1c: A1C with Estimated Average Glucose Result: 7.6 % (01-12-24 @ 12:40)    Glucose: POCT Blood Glucose.: 189 mg/dL (03-18-24 @ 12:17)    BP: --Vital Signs Last 24 Hrs  T(C): 36.7 (03-19-24 @ 08:00), Max: 36.7 (03-19-24 @ 08:00)  T(F): 98.1 (03-19-24 @ 08:00), Max: 98.1 (03-19-24 @ 08:00)  HR: --  BP: --  BP(mean): --  RR: --  SpO2: --    Orthostatic VS  03-19-24 @ 08:00  Lying BP: --/-- HR: --  Sitting BP: 131/92 HR: 85  Standing BP: 136/94 HR: 81  Site: --  Mode: --  Orthostatic VS  03-18-24 @ 08:30  Lying BP: --/-- HR: --  Sitting BP: 152/80 HR: 95  Standing BP: 162/86 HR: 97  Site: --  Mode: --    Lipid Panel:
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
## 2024-03-19 NOTE — BH INPATIENT PSYCHIATRY PROGRESS NOTE - NSBHCHARTREVIEWVS_PSY_A_CORE FT
Vital Signs Last 24 Hrs  T(C): 36.7 (03-19-24 @ 08:00), Max: 36.7 (03-19-24 @ 08:00)  T(F): 98.1 (03-19-24 @ 08:00), Max: 98.1 (03-19-24 @ 08:00)  HR: --  BP: --  BP(mean): --  RR: --  SpO2: --    Orthostatic VS  03-19-24 @ 08:00  Lying BP: --/-- HR: --  Sitting BP: 131/92 HR: 85  Standing BP: 136/94 HR: 81  Site: --  Mode: --  Orthostatic VS  03-18-24 @ 08:30  Lying BP: --/-- HR: --  Sitting BP: 152/80 HR: 95  Standing BP: 162/86 HR: 97  Site: --  Mode: --

## 2024-03-19 NOTE — BH INPATIENT PSYCHIATRY PROGRESS NOTE - NSBHASSESSSUMMFT_PSY_ALL_CORE

## 2024-03-20 PROCEDURE — 99232 SBSQ HOSP IP/OBS MODERATE 35: CPT

## 2024-03-20 RX ORDER — VALACYCLOVIR 500 MG/1
1000 TABLET, FILM COATED ORAL DAILY
Refills: 0 | Status: DISCONTINUED | OUTPATIENT
Start: 2024-03-20 | End: 2024-04-11

## 2024-03-20 RX ORDER — CHOLECALCIFEROL (VITAMIN D3) 125 MCG
400 CAPSULE ORAL DAILY
Refills: 0 | Status: DISCONTINUED | OUTPATIENT
Start: 2024-03-20 | End: 2024-05-17

## 2024-03-20 RX ORDER — OLANZAPINE 15 MG/1
5 TABLET, FILM COATED ORAL ONCE
Refills: 0 | Status: DISCONTINUED | OUTPATIENT
Start: 2024-03-20 | End: 2024-03-28

## 2024-03-20 RX ORDER — OLANZAPINE 15 MG/1
5 TABLET, FILM COATED ORAL EVERY 8 HOURS
Refills: 0 | Status: DISCONTINUED | OUTPATIENT
Start: 2024-03-20 | End: 2024-03-28

## 2024-03-20 RX ADMIN — OLANZAPINE 5 MILLIGRAM(S): 15 TABLET, FILM COATED ORAL at 17:09

## 2024-03-20 RX ADMIN — Medication 50 MILLIGRAM(S): at 08:13

## 2024-03-20 RX ADMIN — CARBIDOPA AND LEVODOPA 1 TABLET(S): 25; 100 TABLET ORAL at 21:10

## 2024-03-20 RX ADMIN — SENNA PLUS 2 TABLET(S): 8.6 TABLET ORAL at 21:09

## 2024-03-20 RX ADMIN — CARBIDOPA AND LEVODOPA 1 TABLET(S): 25; 100 TABLET ORAL at 08:14

## 2024-03-20 RX ADMIN — Medication 75 MICROGRAM(S): at 06:55

## 2024-03-20 RX ADMIN — METFORMIN HYDROCHLORIDE 500 MILLIGRAM(S): 850 TABLET ORAL at 08:14

## 2024-03-20 RX ADMIN — QUETIAPINE FUMARATE 50 MILLIGRAM(S): 200 TABLET, FILM COATED ORAL at 08:14

## 2024-03-20 RX ADMIN — Medication 0.25 MILLIGRAM(S): at 21:09

## 2024-03-20 RX ADMIN — DIVALPROEX SODIUM 500 MILLIGRAM(S): 500 TABLET, DELAYED RELEASE ORAL at 08:13

## 2024-03-20 RX ADMIN — QUETIAPINE FUMARATE 150 MILLIGRAM(S): 200 TABLET, FILM COATED ORAL at 21:10

## 2024-03-20 RX ADMIN — CARBIDOPA AND LEVODOPA 1 TABLET(S): 25; 100 TABLET ORAL at 12:30

## 2024-03-20 RX ADMIN — LISINOPRIL 20 MILLIGRAM(S): 2.5 TABLET ORAL at 08:14

## 2024-03-20 RX ADMIN — Medication 0.25 MILLIGRAM(S): at 08:13

## 2024-03-20 RX ADMIN — METFORMIN HYDROCHLORIDE 500 MILLIGRAM(S): 850 TABLET ORAL at 21:10

## 2024-03-20 RX ADMIN — DIVALPROEX SODIUM 500 MILLIGRAM(S): 500 TABLET, DELAYED RELEASE ORAL at 21:09

## 2024-03-20 RX ADMIN — VALACYCLOVIR 1000 MILLIGRAM(S): 500 TABLET, FILM COATED ORAL at 08:13

## 2024-03-20 RX ADMIN — DIVALPROEX SODIUM 500 MILLIGRAM(S): 500 TABLET, DELAYED RELEASE ORAL at 12:30

## 2024-03-20 RX ADMIN — POLYETHYLENE GLYCOL 3350 17 GRAM(S): 17 POWDER, FOR SOLUTION ORAL at 08:15

## 2024-03-20 RX ADMIN — Medication 1 TABLET(S): at 21:10

## 2024-03-20 NOTE — BH INPATIENT PSYCHIATRY PROGRESS NOTE - CURRENT MEDICATION
MEDICATIONS  (STANDING):  ammonium lactate 12% Lotion 1 Application(s) Topical two times a day  carbidopa/levodopa  25/100 1 Tablet(s) Oral three times a day  cholecalciferol 400 Unit(s) Oral daily  clonazePAM Oral Disintegrating Tablet 0.25 milliGRAM(s) Oral two times a day  dextrose 5%. 1000 milliLiter(s) (50 mL/Hr) IV Continuous <Continuous>  dextrose 5%. 1000 milliLiter(s) (100 mL/Hr) IV Continuous <Continuous>  dextrose 50% Injectable 25 Gram(s) IV Push once  dextrose 50% Injectable 12.5 Gram(s) IV Push once  dextrose 50% Injectable 25 Gram(s) IV Push once  divalproex Sprinkle 500 milliGRAM(s) Oral three times a day  glucagon  Injectable 1 milliGRAM(s) IntraMuscular once  hemorrhoidal Ointment 1 Application(s) Rectal daily  hydrocortisone 2.5% Ointment 1 Application(s) Topical two times a day  insulin lispro (ADMELOG) corrective regimen sliding scale   SubCutaneous three times a day before meals  levothyroxine 75 MICROGram(s) Oral daily  lisinopril 20 milliGRAM(s) Oral daily  metFORMIN 500 milliGRAM(s) Oral two times a day  metoprolol succinate ER 50 milliGRAM(s) Oral daily  multivitamin 1 Tablet(s) Oral at bedtime  polyethylene glycol 3350 17 Gram(s) Oral daily  QUEtiapine 150 milliGRAM(s) Oral at bedtime  QUEtiapine 50 milliGRAM(s) Oral <User Schedule>  senna 2 Tablet(s) Oral at bedtime  valACYclovir 1000 milliGRAM(s) Oral daily    MEDICATIONS  (PRN):  acetaminophen     Tablet .. 650 milliGRAM(s) Oral every 6 hours PRN Mild Pain (1 - 3)  acetaminophen     Tablet .. 650 milliGRAM(s) Oral every 6 hours PRN Mild Pain (1 - 3), Moderate Pain (4 - 6)  albuterol    90 MICROgram(s) HFA Inhaler 2 Puff(s) Inhalation every 6 hours PRN Shortness of Breath and/or Wheezing  aluminum hydroxide/magnesium hydroxide/simethicone Suspension 30 milliLiter(s) Oral every 6 hours PRN Dyspepsia  bisacodyl Suppository 10 milliGRAM(s) Rectal daily PRN constipation  dextrose Oral Gel 15 Gram(s) Oral once PRN Blood Glucose LESS THAN 70 milliGRAM(s)/deciliter  haloperidol    Injectable 1 milliGRAM(s) IntraMuscular once PRN Severe Agitation  QUEtiapine 50 milliGRAM(s) Oral every 6 hours PRN agitation  simethicone 80 milliGRAM(s) Chew every 8 hours PRN gas  sodium chloride 0.65% Nasal 2 Spray(s) Both Nostrils every 6 hours PRN congestion

## 2024-03-20 NOTE — BH INPATIENT PSYCHIATRY PROGRESS NOTE - NSBHFUPINTERVALHXFT_PSY_A_CORE
Pt is seen and evaluated for follow up for schizoaffective disorder. Patient taking meds. VSS Eating sleeping fair Patient putting down paper towels in shape of cross on floor.

## 2024-03-20 NOTE — BH INPATIENT PSYCHIATRY PROGRESS NOTE - MSE UNSTRUCTURED FT
Appearance: casual grooming, no overt deficits in hygiene;  sig tremor not rigid. Some sedation  Behavior: poorly related dismissive  Speech: wnl  Mood: irritable  Affect: congruent, stable, somewhat intense  Thought process: impoverished  Thought content: mistrustfulness noted; denies SI/HI. Yazidi delusions, believes someone has stolen two of her Bibles  Perceptual disturbances: no appearance of internal preoccupation  Cognition: adequately oriented  Insight: limited  Judgement: limited

## 2024-03-20 NOTE — BH INPATIENT PSYCHIATRY PROGRESS NOTE - NSBHCHARTREVIEWVS_PSY_A_CORE FT
Vital Signs Last 24 Hrs  T(C): --  T(F): --  HR: --  BP: --  BP(mean): --  RR: --  SpO2: --    Orthostatic VS  03-19-24 @ 08:00  Lying BP: --/-- HR: --  Sitting BP: 131/92 HR: 85  Standing BP: 136/94 HR: 81  Site: --  Mode: --

## 2024-03-20 NOTE — BH INPATIENT PSYCHIATRY PROGRESS NOTE - NSBHMETABOLIC_PSY_ALL_CORE_FT
BMI: BMI (kg/m2): 30.2 (01-15-24 @ 12:52)  HbA1c: A1C with Estimated Average Glucose Result: 7.6 % (01-12-24 @ 12:40)    Glucose: POCT Blood Glucose.: 162 mg/dL (03-19-24 @ 16:39)    BP: --Vital Signs Last 24 Hrs  T(C): --  T(F): --  HR: --  BP: --  BP(mean): --  RR: --  SpO2: --    Orthostatic VS  03-19-24 @ 08:00  Lying BP: --/-- HR: --  Sitting BP: 131/92 HR: 85  Standing BP: 136/94 HR: 81  Site: --  Mode: --    Lipid Panel:
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
## 2024-03-21 LAB — GLUCOSE BLDC GLUCOMTR-MCNC: 192 MG/DL — HIGH (ref 70–99)

## 2024-03-21 PROCEDURE — 99232 SBSQ HOSP IP/OBS MODERATE 35: CPT

## 2024-03-21 RX ORDER — CLONAZEPAM 1 MG
0.25 TABLET ORAL
Refills: 0 | Status: DISCONTINUED | OUTPATIENT
Start: 2024-03-21 | End: 2024-03-25

## 2024-03-21 RX ORDER — OLANZAPINE 15 MG/1
5 TABLET, FILM COATED ORAL
Refills: 0 | Status: DISCONTINUED | OUTPATIENT
Start: 2024-03-21 | End: 2024-03-22

## 2024-03-21 RX ORDER — OLANZAPINE 15 MG/1
5 TABLET, FILM COATED ORAL
Refills: 0 | Status: DISCONTINUED | OUTPATIENT
Start: 2024-03-21 | End: 2024-03-21

## 2024-03-21 RX ORDER — OLANZAPINE 15 MG/1
5 TABLET, FILM COATED ORAL ONCE
Refills: 0 | Status: COMPLETED | OUTPATIENT
Start: 2024-03-21 | End: 2024-03-21

## 2024-03-21 RX ADMIN — METFORMIN HYDROCHLORIDE 500 MILLIGRAM(S): 850 TABLET ORAL at 11:22

## 2024-03-21 RX ADMIN — CARBIDOPA AND LEVODOPA 1 TABLET(S): 25; 100 TABLET ORAL at 11:23

## 2024-03-21 RX ADMIN — DIVALPROEX SODIUM 500 MILLIGRAM(S): 500 TABLET, DELAYED RELEASE ORAL at 11:18

## 2024-03-21 RX ADMIN — Medication 75 MICROGRAM(S): at 05:48

## 2024-03-21 RX ADMIN — DIVALPROEX SODIUM 500 MILLIGRAM(S): 500 TABLET, DELAYED RELEASE ORAL at 14:00

## 2024-03-21 RX ADMIN — METFORMIN HYDROCHLORIDE 500 MILLIGRAM(S): 850 TABLET ORAL at 21:45

## 2024-03-21 RX ADMIN — OLANZAPINE 5 MILLIGRAM(S): 15 TABLET, FILM COATED ORAL at 14:01

## 2024-03-21 RX ADMIN — DIVALPROEX SODIUM 500 MILLIGRAM(S): 500 TABLET, DELAYED RELEASE ORAL at 21:45

## 2024-03-21 RX ADMIN — Medication 1 APPLICATION(S): at 21:45

## 2024-03-21 RX ADMIN — LISINOPRIL 20 MILLIGRAM(S): 2.5 TABLET ORAL at 11:22

## 2024-03-21 RX ADMIN — SENNA PLUS 2 TABLET(S): 8.6 TABLET ORAL at 21:45

## 2024-03-21 RX ADMIN — Medication 1 TABLET(S): at 21:45

## 2024-03-21 RX ADMIN — CARBIDOPA AND LEVODOPA 1 TABLET(S): 25; 100 TABLET ORAL at 21:45

## 2024-03-21 RX ADMIN — VALACYCLOVIR 1000 MILLIGRAM(S): 500 TABLET, FILM COATED ORAL at 11:22

## 2024-03-21 RX ADMIN — OLANZAPINE 5 MILLIGRAM(S): 15 TABLET, FILM COATED ORAL at 11:18

## 2024-03-21 RX ADMIN — Medication 0.25 MILLIGRAM(S): at 21:45

## 2024-03-21 RX ADMIN — Medication 400 UNIT(S): at 11:23

## 2024-03-21 RX ADMIN — Medication 1: at 16:42

## 2024-03-21 RX ADMIN — Medication 50 MILLIGRAM(S): at 11:23

## 2024-03-21 RX ADMIN — OLANZAPINE 5 MILLIGRAM(S): 15 TABLET, FILM COATED ORAL at 06:34

## 2024-03-21 RX ADMIN — OLANZAPINE 5 MILLIGRAM(S): 15 TABLET, FILM COATED ORAL at 21:45

## 2024-03-21 RX ADMIN — CARBIDOPA AND LEVODOPA 1 TABLET(S): 25; 100 TABLET ORAL at 14:01

## 2024-03-21 RX ADMIN — POLYETHYLENE GLYCOL 3350 17 GRAM(S): 17 POWDER, FOR SOLUTION ORAL at 11:21

## 2024-03-21 RX ADMIN — Medication 0.25 MILLIGRAM(S): at 11:23
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
## 2024-03-21 NOTE — BH INPATIENT PSYCHIATRY PROGRESS NOTE - NSBHMETABOLIC_PSY_ALL_CORE_FT
BMI: BMI (kg/m2): 30.2 (01-15-24 @ 12:52)  HbA1c: A1C with Estimated Average Glucose Result: 7.6 % (01-12-24 @ 12:40)    Glucose: POCT Blood Glucose.: 162 mg/dL (03-19-24 @ 16:39)    BP: --Vital Signs Last 24 Hrs  T(C): --  T(F): --  HR: --  BP: --  BP(mean): --  RR: --  SpO2: --      Lipid Panel:

## 2024-03-21 NOTE — BH INPATIENT PSYCHIATRY PROGRESS NOTE - MSE UNSTRUCTURED FT
Appearance: casual grooming, no overt deficits in hygiene;  sig tremor not rigid. Some sedation  Behavior: poorly related dismissive  Speech: wnl  Mood: irritable  Affect: congruent, stable, somewhat intense  Thought process: impoverished  Thought content: mistrustfulness noted; denies SI/HI. Presybeterian delusions, believes someone has stolen two of her Bibles. believes she is Bahamian Barr[place] likely biblical reference  Perceptual disturbances: no appearance of internal preoccupation  Cognition: adequately oriented  Insight: limited  Judgement: limited

## 2024-03-21 NOTE — BH INPATIENT PSYCHIATRY PROGRESS NOTE - PRN MEDS
MEDICATIONS  (PRN):  acetaminophen     Tablet .. 650 milliGRAM(s) Oral every 6 hours PRN Mild Pain (1 - 3)  acetaminophen     Tablet .. 650 milliGRAM(s) Oral every 6 hours PRN Mild Pain (1 - 3), Moderate Pain (4 - 6)  albuterol    90 MICROgram(s) HFA Inhaler 2 Puff(s) Inhalation every 6 hours PRN Shortness of Breath and/or Wheezing  aluminum hydroxide/magnesium hydroxide/simethicone Suspension 30 milliLiter(s) Oral every 6 hours PRN Dyspepsia  bisacodyl Suppository 10 milliGRAM(s) Rectal daily PRN constipation  dextrose Oral Gel 15 Gram(s) Oral once PRN Blood Glucose LESS THAN 70 milliGRAM(s)/deciliter  OLANZapine Disintegrating Tablet 5 milliGRAM(s) Oral every 8 hours PRN agitation  OLANZapine Injectable 5 milliGRAM(s) IntraMuscular once PRN severe agitation  simethicone 80 milliGRAM(s) Chew every 8 hours PRN gas  sodium chloride 0.65% Nasal 2 Spray(s) Both Nostrils every 6 hours PRN congestion

## 2024-03-21 NOTE — BH INPATIENT PSYCHIATRY PROGRESS NOTE - CURRENT MEDICATION
MEDICATIONS  (STANDING):  ammonium lactate 12% Lotion 1 Application(s) Topical two times a day  carbidopa/levodopa  25/100 1 Tablet(s) Oral three times a day  cholecalciferol 400 Unit(s) Oral daily  clonazePAM Oral Disintegrating Tablet 0.25 milliGRAM(s) Oral two times a day  dextrose 5%. 1000 milliLiter(s) (50 mL/Hr) IV Continuous <Continuous>  dextrose 5%. 1000 milliLiter(s) (100 mL/Hr) IV Continuous <Continuous>  dextrose 50% Injectable 25 Gram(s) IV Push once  dextrose 50% Injectable 12.5 Gram(s) IV Push once  dextrose 50% Injectable 25 Gram(s) IV Push once  divalproex Sprinkle 500 milliGRAM(s) Oral three times a day  glucagon  Injectable 1 milliGRAM(s) IntraMuscular once  hemorrhoidal Ointment 1 Application(s) Rectal daily  hydrocortisone 2.5% Ointment 1 Application(s) Topical two times a day  insulin lispro (ADMELOG) corrective regimen sliding scale   SubCutaneous three times a day before meals  levothyroxine 75 MICROGram(s) Oral daily  lisinopril 20 milliGRAM(s) Oral daily  metFORMIN 500 milliGRAM(s) Oral two times a day  metoprolol succinate ER 50 milliGRAM(s) Oral daily  multivitamin 1 Tablet(s) Oral at bedtime  OLANZapine Disintegrating Tablet 5 milliGRAM(s) Oral two times a day  polyethylene glycol 3350 17 Gram(s) Oral daily  senna 2 Tablet(s) Oral at bedtime  valACYclovir 1000 milliGRAM(s) Oral daily    MEDICATIONS  (PRN):  acetaminophen     Tablet .. 650 milliGRAM(s) Oral every 6 hours PRN Mild Pain (1 - 3)  acetaminophen     Tablet .. 650 milliGRAM(s) Oral every 6 hours PRN Mild Pain (1 - 3), Moderate Pain (4 - 6)  albuterol    90 MICROgram(s) HFA Inhaler 2 Puff(s) Inhalation every 6 hours PRN Shortness of Breath and/or Wheezing  aluminum hydroxide/magnesium hydroxide/simethicone Suspension 30 milliLiter(s) Oral every 6 hours PRN Dyspepsia  bisacodyl Suppository 10 milliGRAM(s) Rectal daily PRN constipation  dextrose Oral Gel 15 Gram(s) Oral once PRN Blood Glucose LESS THAN 70 milliGRAM(s)/deciliter  OLANZapine Disintegrating Tablet 5 milliGRAM(s) Oral every 8 hours PRN agitation  OLANZapine Injectable 5 milliGRAM(s) IntraMuscular once PRN severe agitation  simethicone 80 milliGRAM(s) Chew every 8 hours PRN gas  sodium chloride 0.65% Nasal 2 Spray(s) Both Nostrils every 6 hours PRN congestion

## 2024-03-21 NOTE — BH INPATIENT PSYCHIATRY PROGRESS NOTE - NSBHFUPINTERVALHXFT_PSY_A_CORE
Pt is seen and evaluated for follow up for schizoaffective disorder. Patient taking meds.Refused vitals.  Eating sleeping fair Patient putting down paper towels in shape of cross on floor. Intrusive, intimidating with peers but not physical

## 2024-03-22 LAB
GLUCOSE BLDC GLUCOMTR-MCNC: 149 MG/DL — HIGH (ref 70–99)
GLUCOSE BLDC GLUCOMTR-MCNC: 188 MG/DL — HIGH (ref 70–99)

## 2024-03-22 PROCEDURE — 99232 SBSQ HOSP IP/OBS MODERATE 35: CPT

## 2024-03-22 RX ORDER — OLANZAPINE 15 MG/1
5 TABLET, FILM COATED ORAL THREE TIMES A DAY
Refills: 0 | Status: DISCONTINUED | OUTPATIENT
Start: 2024-03-22 | End: 2024-03-27

## 2024-03-22 RX ADMIN — Medication 75 MICROGRAM(S): at 05:53

## 2024-03-22 RX ADMIN — Medication 50 MILLIGRAM(S): at 12:26

## 2024-03-22 RX ADMIN — OLANZAPINE 5 MILLIGRAM(S): 15 TABLET, FILM COATED ORAL at 12:24

## 2024-03-22 RX ADMIN — CARBIDOPA AND LEVODOPA 1 TABLET(S): 25; 100 TABLET ORAL at 17:16

## 2024-03-22 RX ADMIN — OLANZAPINE 5 MILLIGRAM(S): 15 TABLET, FILM COATED ORAL at 20:15

## 2024-03-22 RX ADMIN — METFORMIN HYDROCHLORIDE 500 MILLIGRAM(S): 850 TABLET ORAL at 20:15

## 2024-03-22 RX ADMIN — LISINOPRIL 20 MILLIGRAM(S): 2.5 TABLET ORAL at 12:25

## 2024-03-22 RX ADMIN — METFORMIN HYDROCHLORIDE 500 MILLIGRAM(S): 850 TABLET ORAL at 12:25

## 2024-03-22 RX ADMIN — Medication 400 UNIT(S): at 12:24

## 2024-03-22 RX ADMIN — DIVALPROEX SODIUM 500 MILLIGRAM(S): 500 TABLET, DELAYED RELEASE ORAL at 17:15

## 2024-03-22 RX ADMIN — Medication 0.25 MILLIGRAM(S): at 12:25

## 2024-03-22 RX ADMIN — Medication 0.25 MILLIGRAM(S): at 20:15

## 2024-03-22 RX ADMIN — Medication 1 TABLET(S): at 20:15

## 2024-03-22 RX ADMIN — OLANZAPINE 5 MILLIGRAM(S): 15 TABLET, FILM COATED ORAL at 17:16

## 2024-03-22 RX ADMIN — DIVALPROEX SODIUM 500 MILLIGRAM(S): 500 TABLET, DELAYED RELEASE ORAL at 12:24

## 2024-03-22 RX ADMIN — CARBIDOPA AND LEVODOPA 1 TABLET(S): 25; 100 TABLET ORAL at 20:15

## 2024-03-22 RX ADMIN — SENNA PLUS 2 TABLET(S): 8.6 TABLET ORAL at 20:15

## 2024-03-22 RX ADMIN — VALACYCLOVIR 1000 MILLIGRAM(S): 500 TABLET, FILM COATED ORAL at 12:24

## 2024-03-22 RX ADMIN — Medication 1 APPLICATION(S): at 20:14

## 2024-03-22 RX ADMIN — DIVALPROEX SODIUM 500 MILLIGRAM(S): 500 TABLET, DELAYED RELEASE ORAL at 20:15

## 2024-03-22 RX ADMIN — CARBIDOPA AND LEVODOPA 1 TABLET(S): 25; 100 TABLET ORAL at 12:25

## 2024-03-22 NOTE — BH INPATIENT PSYCHIATRY PROGRESS NOTE - NSBHASSESSSUMMFT_PSY_ALL_CORE
2/5 Patient showing some improving trend will give more time, if needs more medication titration due to ongoing symptoms will increase Seroquel not haldol  2/6 Improving cont meds, offered prn suppository and simethicone  2/7 Slowly improving will cont current meds, eval need for further increase Seroquel  2/8 Partial response continue meds except will increase Seroquel as was on 250mg/d PTA  2/9 Overall gains, still regresses and becomes difficult to manage will increase Seroquel to 200mg/d in divided dosing  2/12 A little calmer since started on additional mid day, cont other meds w/o change  2/13 Patient better still disorganized, hyper Restoration but less agitated, cont current treatment  2/14 Improved globally with some symptom variability. Cont current med consider further increment Seroquel  2/15 Improved cont current regimen for now  2/16 Showing  some gains continue treatment for now at current doses   2/20 Improved, cont current rx. Scheduled urogyn for pessary replacement  2/21 Improved with variable intrusiveness. Cont meds will increase Miralax and Senna  2/22 Improving cont current rx, observe for effect of re-titration of laxatives  2/23 Lost balance mainly due to behavior however to be safe will lower haldol and Klonopin modestly and observe. Otherwise generally manageable  2/24: No PRNs needed overnight. Compliant with meds. As per staff last night pt was banging her Bible against the wall and lost her balance, staff held her. Pt remains talkative and intrusive at times but overall calmer and redirectable.   2/25: remains labile, loud, easily agitated but more redirectable.  2/26 Patient seen by medicine no evidence injury needing imaging, just Tylenol prn. Will cont current meds, if anteroflexion dosesn't improve with recent drop in haldol will consider further dose reduction  2/27 Has had slow increase in EPS despite no increase in dose and some reduction will hold haldol to reduce EPS and restart at lower dose  2/28 Patient with change in med tolerance some agitation and sedation. Will hold haldol tonight consider changing to Prolixin as may be a little better for EPS and will check labs to look for any medical issue  2/29 Labile irritable, increased tremor improved with washout of haldol. Will restart haldol but see if she can be stabilized at low dose to minimize tremor. COuld consider increasing Seroquel  3/1 Improved will see if she can stabilize on Seroquel and low dose haldol to minimize EPS will address nasal symptoms  3/2 Patient better less bent tremor less calmer. Attempt to find dose of haldol that control psychosis agitation with manageable tremor. May need to try 2mg bid as 1mg big may be subtherapeutic  3/4 Some improvement in that less agitated but EPS better will cont with low dose haldol Issues is that both side effects and decompenstion have had long lags related to dose changes so unclear if at haldol 1mg bid she is better EPS wise but will gradually decompensate. Cont to observe consider increase to 1mg tid. Patient's 2PC expiring she remains delusional disorganized not yet able to managed in SNF will apply for further retention  3/5 Showing some improvement with less agitation despite delusions but also less EPS with lowered haldol. Cont meds will reduce klonopin from 0.25mg tid to bid to reduce polypharm cont other meds. If calmer less disorganized will ask PT if she can walker trial  3/6: behavior in better control, delusional, suspected AH. taking meds.   3/7: can become irritable, redirectable. c/w current meds, ambulating hunched over.   3/8: went to interventional Urology on Fri, UA ordered, ultrasound of bladder scheduled for Monday at 10 AM,  and ob gyn for pessary ordered. If blood in urine—send back to urology office for cysto (please see the full consult in chart)  3/9: Somewhat sedated this AM, calm, not agitated, will follow and adjust meds as necessary (clonazepam)   3/10: More alert today, suspicious of examiner, UA negative  3/11: Mistrustfulness remains, leading to irritability with staff; no prns given   3/12: Labile and irritability noted, poor insight and requires continued inpatient psychiatric hospitalization for safety and stabilization.  3/13: labile, irritable on approach, redirectable. c/w current meds. Delusions noted on exam.  3/14: Labile and irritability noted, poor insight and requires continued inpatient psychiatric hospitalization for safety and stabilization.  3/15: labile, irritable on approach, redirectable. c/w current meds.  3/18 doing poor on regimen where haldol on low dose ineffective or causes sig tremor and anterflexion on higher doses. Will stop and review alternatives such as prolixin or risperidone  3/19 Patient with less sedation , still sig trremor. Delusional irritable, in altercation with roommate sees her room as belonging solely to her. Cont off haldol consider second antipsychotic has been on olanzapine, Prolixin, risperidone, she believes has not been on Abilify will consider this as trial with option for SHAFER  3/20 Psychotic irritable disorganized may go back to trial of olanzapine to allow for monotherapy and less EPS. Spoke with meiolivaine will lower Valtrex as dose is above recommended for maintenance  3/21 Louder more intrusive. Will titrate olanzapine ands use prns target 20-30mg, may need second antipsychotic given treatment resistance and clozapine not feasible  3/22 Patient sl calmer with po and prn olanzapine still lous intrusive irritable, needing prns. will increase to 5mg tid. Plan when calmer attempt to simplify regimen to bid to improve compliance

## 2024-03-22 NOTE — BH INPATIENT PSYCHIATRY PROGRESS NOTE - MSE UNSTRUCTURED FT
Appearance: casual grooming, no overt deficits in hygiene;  sig tremor not rigid. Some sedation  Behavior: poorly related dismissive  Speech: loud  Mood: irritable  Affect: congruent, stable, somewhat intense  Thought process: impoverished  Thought content: mistrustfulness noted; denies SI/HI. Buddhist delusions, believes someone has stolen two of her Bibles. believes she is [place] Barr[place] likely biblical reference. Some sexual pre-occupation  Perceptual disturbances: no appearance of internal preoccupation  Cognition: adequately oriented  Insight: limited  Judgement: limited

## 2024-03-22 NOTE — BH INPATIENT PSYCHIATRY PROGRESS NOTE - NSBHCHARTREVIEWVS_PSY_A_CORE FT
Vital Signs Last 24 Hrs  T(C): --  T(F): --  HR: --  BP: --  BP(mean): --  RR: --  SpO2: --    Orthostatic VS  03-22-24 @ 12:17  Lying BP: --/-- HR: --  Sitting BP: 129/82 HR: 93  Standing BP: 145/103 HR: 114  Site: upper left arm  Mode: electronic

## 2024-03-22 NOTE — BH INPATIENT PSYCHIATRY PROGRESS NOTE - CURRENT MEDICATION
MEDICATIONS  (STANDING):  ammonium lactate 12% Lotion 1 Application(s) Topical two times a day  carbidopa/levodopa  25/100 1 Tablet(s) Oral three times a day  cholecalciferol 400 Unit(s) Oral daily  clonazePAM Oral Disintegrating Tablet 0.25 milliGRAM(s) Oral two times a day  dextrose 5%. 1000 milliLiter(s) (50 mL/Hr) IV Continuous <Continuous>  dextrose 5%. 1000 milliLiter(s) (100 mL/Hr) IV Continuous <Continuous>  dextrose 50% Injectable 25 Gram(s) IV Push once  dextrose 50% Injectable 12.5 Gram(s) IV Push once  dextrose 50% Injectable 25 Gram(s) IV Push once  divalproex Sprinkle 500 milliGRAM(s) Oral three times a day  glucagon  Injectable 1 milliGRAM(s) IntraMuscular once  hemorrhoidal Ointment 1 Application(s) Rectal daily  hydrocortisone 2.5% Ointment 1 Application(s) Topical two times a day  insulin lispro (ADMELOG) corrective regimen sliding scale   SubCutaneous three times a day before meals  levothyroxine 75 MICROGram(s) Oral daily  lisinopril 20 milliGRAM(s) Oral daily  metFORMIN 500 milliGRAM(s) Oral two times a day  metoprolol succinate ER 50 milliGRAM(s) Oral daily  multivitamin 1 Tablet(s) Oral at bedtime  OLANZapine Disintegrating Tablet 5 milliGRAM(s) Oral three times a day  polyethylene glycol 3350 17 Gram(s) Oral daily  senna 2 Tablet(s) Oral at bedtime  valACYclovir 1000 milliGRAM(s) Oral daily    MEDICATIONS  (PRN):  acetaminophen     Tablet .. 650 milliGRAM(s) Oral every 6 hours PRN Mild Pain (1 - 3)  acetaminophen     Tablet .. 650 milliGRAM(s) Oral every 6 hours PRN Mild Pain (1 - 3), Moderate Pain (4 - 6)  albuterol    90 MICROgram(s) HFA Inhaler 2 Puff(s) Inhalation every 6 hours PRN Shortness of Breath and/or Wheezing  aluminum hydroxide/magnesium hydroxide/simethicone Suspension 30 milliLiter(s) Oral every 6 hours PRN Dyspepsia  bisacodyl Suppository 10 milliGRAM(s) Rectal daily PRN constipation  dextrose Oral Gel 15 Gram(s) Oral once PRN Blood Glucose LESS THAN 70 milliGRAM(s)/deciliter  OLANZapine Disintegrating Tablet 5 milliGRAM(s) Oral every 8 hours PRN agitation  OLANZapine Injectable 5 milliGRAM(s) IntraMuscular once PRN severe agitation  simethicone 80 milliGRAM(s) Chew every 8 hours PRN gas  sodium chloride 0.65% Nasal 2 Spray(s) Both Nostrils every 6 hours PRN congestion

## 2024-03-22 NOTE — BH INPATIENT PSYCHIATRY PROGRESS NOTE - NSBHFUPINTERVALHXFT_PSY_A_CORE
Pt is seen and evaluated for follow up for schizoaffective disorder. Patient taking meds but often after initially refusing .VSS  Eating sleeping fair Patient putting down paper towels in shape of cross on floor. Intrusive, intimidating with peers but not physical

## 2024-03-22 NOTE — BH INPATIENT PSYCHIATRY PROGRESS NOTE - NSBHMETABOLIC_PSY_ALL_CORE_FT
BMI: BMI (kg/m2): 30.2 (01-15-24 @ 12:52)  HbA1c: A1C with Estimated Average Glucose Result: 7.6 % (01-12-24 @ 12:40)    Glucose: POCT Blood Glucose.: 149 mg/dL (03-22-24 @ 07:57)    BP: 146/89 (03-21-24 @ 08:09) (146/89 - 146/89)Vital Signs Last 24 Hrs  T(C): --  T(F): --  HR: --  BP: --  BP(mean): --  RR: --  SpO2: --    Orthostatic VS  03-22-24 @ 12:17  Lying BP: --/-- HR: --  Sitting BP: 129/82 HR: 93  Standing BP: 145/103 HR: 114  Site: upper left arm  Mode: electronic    Lipid Panel:

## 2024-03-23 PROCEDURE — 99231 SBSQ HOSP IP/OBS SF/LOW 25: CPT

## 2024-03-23 RX ADMIN — Medication 50 MILLIGRAM(S): at 08:14

## 2024-03-23 RX ADMIN — OLANZAPINE 5 MILLIGRAM(S): 15 TABLET, FILM COATED ORAL at 08:13

## 2024-03-23 RX ADMIN — Medication 0.25 MILLIGRAM(S): at 08:13

## 2024-03-23 RX ADMIN — POLYETHYLENE GLYCOL 3350 17 GRAM(S): 17 POWDER, FOR SOLUTION ORAL at 08:17

## 2024-03-23 RX ADMIN — Medication 75 MICROGRAM(S): at 05:52

## 2024-03-23 RX ADMIN — PHENYLEPHRINE-SHARK LIVER OIL-MINERAL OIL-PETROLATUM RECTAL OINTMENT 1 APPLICATION(S): at 08:18

## 2024-03-23 RX ADMIN — Medication 650 MILLIGRAM(S): at 08:24

## 2024-03-23 RX ADMIN — OLANZAPINE 5 MILLIGRAM(S): 15 TABLET, FILM COATED ORAL at 20:11

## 2024-03-23 RX ADMIN — METFORMIN HYDROCHLORIDE 500 MILLIGRAM(S): 850 TABLET ORAL at 08:13

## 2024-03-23 RX ADMIN — LISINOPRIL 20 MILLIGRAM(S): 2.5 TABLET ORAL at 08:13

## 2024-03-23 RX ADMIN — DIVALPROEX SODIUM 500 MILLIGRAM(S): 500 TABLET, DELAYED RELEASE ORAL at 20:10

## 2024-03-23 RX ADMIN — DIVALPROEX SODIUM 500 MILLIGRAM(S): 500 TABLET, DELAYED RELEASE ORAL at 12:05

## 2024-03-23 RX ADMIN — Medication 0.25 MILLIGRAM(S): at 20:11

## 2024-03-23 RX ADMIN — CARBIDOPA AND LEVODOPA 1 TABLET(S): 25; 100 TABLET ORAL at 20:11

## 2024-03-23 RX ADMIN — Medication 1 TABLET(S): at 20:11

## 2024-03-23 RX ADMIN — VALACYCLOVIR 1000 MILLIGRAM(S): 500 TABLET, FILM COATED ORAL at 08:16

## 2024-03-23 RX ADMIN — METFORMIN HYDROCHLORIDE 500 MILLIGRAM(S): 850 TABLET ORAL at 20:11

## 2024-03-23 RX ADMIN — Medication 650 MILLIGRAM(S): at 06:41

## 2024-03-23 RX ADMIN — OLANZAPINE 5 MILLIGRAM(S): 15 TABLET, FILM COATED ORAL at 12:05

## 2024-03-23 RX ADMIN — Medication 400 UNIT(S): at 08:13

## 2024-03-23 RX ADMIN — CARBIDOPA AND LEVODOPA 1 TABLET(S): 25; 100 TABLET ORAL at 08:13

## 2024-03-23 RX ADMIN — CARBIDOPA AND LEVODOPA 1 TABLET(S): 25; 100 TABLET ORAL at 12:05

## 2024-03-23 RX ADMIN — DIVALPROEX SODIUM 500 MILLIGRAM(S): 500 TABLET, DELAYED RELEASE ORAL at 08:13

## 2024-03-23 RX ADMIN — SENNA PLUS 2 TABLET(S): 8.6 TABLET ORAL at 20:11

## 2024-03-23 RX ADMIN — Medication 30 MILLILITER(S): at 06:42

## 2024-03-23 NOTE — BH INPATIENT PSYCHIATRY PROGRESS NOTE - NSBHMETABOLIC_PSY_ALL_CORE_FT
Vit d3  2000 units  a day   Calcium 500 mg tablets a day      BMI: BMI (kg/m2): 30.2 (01-15-24 @ 12:52)  HbA1c: A1C with Estimated Average Glucose Result: 7.6 % (01-12-24 @ 12:40)    Glucose: POCT Blood Glucose.: 188 mg/dL (03-22-24 @ 16:50)    BP: 146/89 (03-21-24 @ 08:09) (146/89 - 146/89)Vital Signs Last 24 Hrs  T(C): 36.8 (03-23-24 @ 05:51), Max: 36.8 (03-23-24 @ 05:51)  T(F): 98.2 (03-23-24 @ 05:51), Max: 98.2 (03-23-24 @ 05:51)  HR: --  BP: --  BP(mean): --  RR: --  SpO2: --    Orthostatic VS  03-23-24 @ 05:51  Lying BP: --/-- HR: --  Sitting BP: 163/115 HR: 103  Standing BP: 149/83 HR: 131  Site: --  Mode: --  Orthostatic VS  03-22-24 @ 12:17  Lying BP: --/-- HR: --  Sitting BP: 129/82 HR: 93  Standing BP: 145/103 HR: 114  Site: upper left arm  Mode: electronic    Lipid Panel:

## 2024-03-23 NOTE — BH INPATIENT PSYCHIATRY PROGRESS NOTE - NSBHASSESSSUMMFT_PSY_ALL_CORE
2/5 Patient showing some improving trend will give more time, if needs more medication titration due to ongoing symptoms will increase Seroquel not haldol  2/6 Improving cont meds, offered prn suppository and simethicone  2/7 Slowly improving will cont current meds, eval need for further increase Seroquel  2/8 Partial response continue meds except will increase Seroquel as was on 250mg/d PTA  2/9 Overall gains, still regresses and becomes difficult to manage will increase Seroquel to 200mg/d in divided dosing  2/12 A little calmer since started on additional mid day, cont other meds w/o change  2/13 Patient better still disorganized, hyper Jain but less agitated, cont current treatment  2/14 Improved globally with some symptom variability. Cont current med consider further increment Seroquel  2/15 Improved cont current regimen for now  2/16 Showing  some gains continue treatment for now at current doses   2/20 Improved, cont current rx. Scheduled urogyn for pessary replacement  2/21 Improved with variable intrusiveness. Cont meds will increase Miralax and Senna  2/22 Improving cont current rx, observe for effect of re-titration of laxatives  2/23 Lost balance mainly due to behavior however to be safe will lower haldol and Klonopin modestly and observe. Otherwise generally manageable  2/24: No PRNs needed overnight. Compliant with meds. As per staff last night pt was banging her Bible against the wall and lost her balance, staff held her. Pt remains talkative and intrusive at times but overall calmer and redirectable.   2/25: remains labile, loud, easily agitated but more redirectable.  2/26 Patient seen by medicine no evidence injury needing imaging, just Tylenol prn. Will cont current meds, if anteroflexion dosesn't improve with recent drop in haldol will consider further dose reduction  2/27 Has had slow increase in EPS despite no increase in dose and some reduction will hold haldol to reduce EPS and restart at lower dose  2/28 Patient with change in med tolerance some agitation and sedation. Will hold haldol tonight consider changing to Prolixin as may be a little better for EPS and will check labs to look for any medical issue  2/29 Labile irritable, increased tremor improved with washout of haldol. Will restart haldol but see if she can be stabilized at low dose to minimize tremor. COuld consider increasing Seroquel  3/1 Improved will see if she can stabilize on Seroquel and low dose haldol to minimize EPS will address nasal symptoms  3/2 Patient better less bent tremor less calmer. Attempt to find dose of haldol that control psychosis agitation with manageable tremor. May need to try 2mg bid as 1mg big may be subtherapeutic  3/4 Some improvement in that less agitated but EPS better will cont with low dose haldol Issues is that both side effects and decompenstion have had long lags related to dose changes so unclear if at haldol 1mg bid she is better EPS wise but will gradually decompensate. Cont to observe consider increase to 1mg tid. Patient's 2PC expiring she remains delusional disorganized not yet able to managed in SNF will apply for further retention  3/5 Showing some improvement with less agitation despite delusions but also less EPS with lowered haldol. Cont meds will reduce klonopin from 0.25mg tid to bid to reduce polypharm cont other meds. If calmer less disorganized will ask PT if she can walker trial  3/6: behavior in better control, delusional, suspected AH. taking meds.   3/7: can become irritable, redirectable. c/w current meds, ambulating hunched over.   3/8: went to interventional Urology on Fri, UA ordered, ultrasound of bladder scheduled for Monday at 10 AM,  and ob gyn for pessary ordered. If blood in urine—send back to urology office for cysto (please see the full consult in chart)  3/9: Somewhat sedated this AM, calm, not agitated, will follow and adjust meds as necessary (clonazepam)   3/10: More alert today, suspicious of examiner, UA negative  3/11: Mistrustfulness remains, leading to irritability with staff; no prns given   3/12: Labile and irritability noted, poor insight and requires continued inpatient psychiatric hospitalization for safety and stabilization.  3/13: labile, irritable on approach, redirectable. c/w current meds. Delusions noted on exam.  3/14: Labile and irritability noted, poor insight and requires continued inpatient psychiatric hospitalization for safety and stabilization.  3/15: labile, irritable on approach, redirectable. c/w current meds.  3/18 doing poor on regimen where haldol on low dose ineffective or causes sig tremor and anterflexion on higher doses. Will stop and review alternatives such as prolixin or risperidone  3/19 Patient with less sedation , still sig trremor. Delusional irritable, in altercation with roommate sees her room as belonging solely to her. Cont off haldol consider second antipsychotic has been on olanzapine, Prolixin, risperidone, she believes has not been on Abilify will consider this as trial with option for SHAFER  3/20 Psychotic irritable disorganized may go back to trial of olanzapine to allow for monotherapy and less EPS. Spoke with meidcine will lower Valtrex as dose is above recommended for maintenance  3/21 Louder more intrusive. Will titrate olanzapine ands use prns target 20-30mg, may need second antipsychotic given treatment resistance and clozapine not feasible  3/22 Patient sl calmer with po and prn olanzapine still lous intrusive irritable, needing prns. will increase to 5mg tid. Plan when calmer attempt to simplify regimen to bid to improve compliance.  3/23: no PRN overnight/this am. Noted walking in dayroom, calmer, no aggression now.

## 2024-03-23 NOTE — CHART NOTE - NSCHARTNOTEFT_GEN_A_CORE
Pt s/p fall. Was sitting in a chair, went to get water and slid off the chair onto the floor landing on her buttocks, unable to get herself up. The pt is a poor historian, with difficulty answering direct questions at baseline. Denies body pain, dizziness, blurry vision, nausea when asked.  The event was witnessed by ancillary staff. No head strike, LOC noted. The pt was helped off the floor and placed back into a chair.   General NAD, able to ambulate with assistance, without limp.   VSS  HEENT: AT/ NC, EOMI.   lungs: CTA b/L, No R/R/W.   MS: No pain to palpation of b/l shoulders, elbows, wrists, hands, hips, knees, ankles, feet.  Neuro: Resting tremor.   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 Pt helped up and requested to be placed back into a chair. Pt able to ambulate with assistance and without limp and denies pain. No physical signs of trauma.   - Atraumatic fall: Fall precautions, PT evaluation for gait assessment.   - Resting tremor likely secondary to Parkinson's disease.

## 2024-03-23 NOTE — BH INPATIENT PSYCHIATRY PROGRESS NOTE - NSBHCHARTREVIEWVS_PSY_A_CORE FT
Vital Signs Last 24 Hrs  T(C): 36.8 (03-23-24 @ 05:51), Max: 36.8 (03-23-24 @ 05:51)  T(F): 98.2 (03-23-24 @ 05:51), Max: 98.2 (03-23-24 @ 05:51)  HR: --  BP: --  BP(mean): --  RR: --  SpO2: --    Orthostatic VS  03-23-24 @ 05:51  Lying BP: --/-- HR: --  Sitting BP: 163/115 HR: 103  Standing BP: 149/83 HR: 131  Site: --  Mode: --  Orthostatic VS  03-22-24 @ 12:17  Lying BP: --/-- HR: --  Sitting BP: 129/82 HR: 93  Standing BP: 145/103 HR: 114  Site: upper left arm  Mode: electronic

## 2024-03-23 NOTE — BH INPATIENT PSYCHIATRY PROGRESS NOTE - CURRENT MEDICATION
MEDICATIONS  (STANDING):  ammonium lactate 12% Lotion 1 Application(s) Topical two times a day  carbidopa/levodopa  25/100 1 Tablet(s) Oral three times a day  cholecalciferol 400 Unit(s) Oral daily  clonazePAM Oral Disintegrating Tablet 0.25 milliGRAM(s) Oral two times a day  dextrose 5%. 1000 milliLiter(s) (50 mL/Hr) IV Continuous <Continuous>  dextrose 5%. 1000 milliLiter(s) (100 mL/Hr) IV Continuous <Continuous>  dextrose 50% Injectable 25 Gram(s) IV Push once  dextrose 50% Injectable 12.5 Gram(s) IV Push once  dextrose 50% Injectable 25 Gram(s) IV Push once  divalproex Sprinkle 500 milliGRAM(s) Oral three times a day  glucagon  Injectable 1 milliGRAM(s) IntraMuscular once  hemorrhoidal Ointment 1 Application(s) Rectal daily  hydrocortisone 2.5% Ointment 1 Application(s) Topical two times a day  insulin lispro (ADMELOG) corrective regimen sliding scale   SubCutaneous three times a day before meals  levothyroxine 75 MICROGram(s) Oral daily  lisinopril 20 milliGRAM(s) Oral daily  metFORMIN 500 milliGRAM(s) Oral two times a day  metoprolol succinate ER 50 milliGRAM(s) Oral daily  multivitamin 1 Tablet(s) Oral at bedtime  OLANZapine Disintegrating Tablet 5 milliGRAM(s) Oral three times a day  polyethylene glycol 3350 17 Gram(s) Oral daily  senna 2 Tablet(s) Oral at bedtime  valACYclovir 1000 milliGRAM(s) Oral daily    MEDICATIONS  (PRN):  acetaminophen     Tablet .. 650 milliGRAM(s) Oral every 6 hours PRN Mild Pain (1 - 3), Moderate Pain (4 - 6)  albuterol    90 MICROgram(s) HFA Inhaler 2 Puff(s) Inhalation every 6 hours PRN Shortness of Breath and/or Wheezing  aluminum hydroxide/magnesium hydroxide/simethicone Suspension 30 milliLiter(s) Oral every 6 hours PRN Dyspepsia  bisacodyl Suppository 10 milliGRAM(s) Rectal daily PRN constipation  dextrose Oral Gel 15 Gram(s) Oral once PRN Blood Glucose LESS THAN 70 milliGRAM(s)/deciliter  OLANZapine Disintegrating Tablet 5 milliGRAM(s) Oral every 8 hours PRN agitation  OLANZapine Injectable 5 milliGRAM(s) IntraMuscular once PRN severe agitation  simethicone 80 milliGRAM(s) Chew every 8 hours PRN gas  sodium chloride 0.65% Nasal 2 Spray(s) Both Nostrils every 6 hours PRN congestion

## 2024-03-23 NOTE — BH INPATIENT PSYCHIATRY PROGRESS NOTE - NSBHFUPINTERVALHXFT_PSY_A_CORE
Pt is seen and evaluated for follow up for schizoaffective disorder. Chart reviewed and discussed with nursing staff. Pt is compliant with medications, no acute events overnight/this morning, no PRN needed. Vitals noted, high pulse, asymptomatic. On encounter, Pt was on gerichair in dayroom, sleepy, does not respond or engage in interview on repeated attempts.  She was walking in hallway earlier, no agitation noted. Per staff, pt remains labile, irritable, intrusive, intimidating with peers but not physical.

## 2024-03-23 NOTE — BH INPATIENT PSYCHIATRY PROGRESS NOTE - PRN MEDS
MEDICATIONS  (PRN):  acetaminophen     Tablet .. 650 milliGRAM(s) Oral every 6 hours PRN Mild Pain (1 - 3), Moderate Pain (4 - 6)  albuterol    90 MICROgram(s) HFA Inhaler 2 Puff(s) Inhalation every 6 hours PRN Shortness of Breath and/or Wheezing  aluminum hydroxide/magnesium hydroxide/simethicone Suspension 30 milliLiter(s) Oral every 6 hours PRN Dyspepsia  bisacodyl Suppository 10 milliGRAM(s) Rectal daily PRN constipation  dextrose Oral Gel 15 Gram(s) Oral once PRN Blood Glucose LESS THAN 70 milliGRAM(s)/deciliter  OLANZapine Disintegrating Tablet 5 milliGRAM(s) Oral every 8 hours PRN agitation  OLANZapine Injectable 5 milliGRAM(s) IntraMuscular once PRN severe agitation  simethicone 80 milliGRAM(s) Chew every 8 hours PRN gas  sodium chloride 0.65% Nasal 2 Spray(s) Both Nostrils every 6 hours PRN congestion

## 2024-03-24 LAB — GLUCOSE BLDC GLUCOMTR-MCNC: 123 MG/DL — HIGH (ref 70–99)

## 2024-03-24 PROCEDURE — 99231 SBSQ HOSP IP/OBS SF/LOW 25: CPT

## 2024-03-24 RX ORDER — OLANZAPINE 15 MG/1
5 TABLET, FILM COATED ORAL ONCE
Refills: 0 | Status: COMPLETED | OUTPATIENT
Start: 2024-03-24 | End: 2024-03-24

## 2024-03-24 RX ADMIN — Medication 0.25 MILLIGRAM(S): at 09:53

## 2024-03-24 RX ADMIN — Medication 400 UNIT(S): at 09:54

## 2024-03-24 RX ADMIN — OLANZAPINE 5 MILLIGRAM(S): 15 TABLET, FILM COATED ORAL at 21:17

## 2024-03-24 RX ADMIN — Medication 50 MILLIGRAM(S): at 09:54

## 2024-03-24 RX ADMIN — DIVALPROEX SODIUM 500 MILLIGRAM(S): 500 TABLET, DELAYED RELEASE ORAL at 09:53

## 2024-03-24 RX ADMIN — CARBIDOPA AND LEVODOPA 1 TABLET(S): 25; 100 TABLET ORAL at 12:17

## 2024-03-24 RX ADMIN — CARBIDOPA AND LEVODOPA 1 TABLET(S): 25; 100 TABLET ORAL at 21:16

## 2024-03-24 RX ADMIN — DIVALPROEX SODIUM 500 MILLIGRAM(S): 500 TABLET, DELAYED RELEASE ORAL at 12:17

## 2024-03-24 RX ADMIN — LISINOPRIL 20 MILLIGRAM(S): 2.5 TABLET ORAL at 09:54

## 2024-03-24 RX ADMIN — OLANZAPINE 5 MILLIGRAM(S): 15 TABLET, FILM COATED ORAL at 12:18

## 2024-03-24 RX ADMIN — METFORMIN HYDROCHLORIDE 500 MILLIGRAM(S): 850 TABLET ORAL at 09:53

## 2024-03-24 RX ADMIN — POLYETHYLENE GLYCOL 3350 17 GRAM(S): 17 POWDER, FOR SOLUTION ORAL at 09:52

## 2024-03-24 RX ADMIN — DIVALPROEX SODIUM 500 MILLIGRAM(S): 500 TABLET, DELAYED RELEASE ORAL at 21:15

## 2024-03-24 RX ADMIN — VALACYCLOVIR 1000 MILLIGRAM(S): 500 TABLET, FILM COATED ORAL at 09:53

## 2024-03-24 RX ADMIN — OLANZAPINE 5 MILLIGRAM(S): 15 TABLET, FILM COATED ORAL at 06:16

## 2024-03-24 RX ADMIN — CARBIDOPA AND LEVODOPA 1 TABLET(S): 25; 100 TABLET ORAL at 09:53

## 2024-03-24 RX ADMIN — Medication 1 TABLET(S): at 21:16

## 2024-03-24 RX ADMIN — METFORMIN HYDROCHLORIDE 500 MILLIGRAM(S): 850 TABLET ORAL at 21:17

## 2024-03-24 RX ADMIN — SENNA PLUS 2 TABLET(S): 8.6 TABLET ORAL at 21:16

## 2024-03-24 RX ADMIN — Medication 75 MICROGRAM(S): at 05:15

## 2024-03-24 RX ADMIN — Medication 0.25 MILLIGRAM(S): at 21:14

## 2024-03-24 NOTE — BH INPATIENT PSYCHIATRY PROGRESS NOTE - NSBHMSESPABN_PSY_A_CORE
Loud volume/Impaired articulation
Loud volume/Impaired articulation
Loud volume/Increased productivity/Impaired articulation

## 2024-03-24 NOTE — BH INPATIENT PSYCHIATRY PROGRESS NOTE - NSBHMSEEYE_PSY_A_CORE
reports poor eyesight/Poor
reports poor eyesight/Other
reports poor eyesight/Other
reports poor eyesight/Poor

## 2024-03-24 NOTE — CHART NOTE - NSCHARTNOTEFT_GEN_A_CORE
Pt noted to be walking around naked, entering another residents room, covering herself in soap and wanting to enter the shower. The pt walked out of the residents room, and slid onto the floor landing on her buttocks. The event was witnessed by ancillary staff, No LOC, head strike noted.  The pt got herself up and went back into her room and laid in her bed. The pt offers no complaints of pain. Adamantly refusing physical exam standing by her door, blocking writer from entering and saying to be left alone and spat on an RN. The pt denies body pain when asked.  General: Agitated.  VSS  MS: No visible signs of injury.  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 Refusing physical exam. Becoming agitated and violent with nursing staff.   Pt placed on fall precautions. Will order UA to assess for possible UTI induced psychosis. Pt noted to be walking around naked, entering another residents room, covering herself in soap and wanting to enter the shower. The pt walked out of the residents room, and slid onto the floor landing on her buttocks. The event was witnessed by ancillary staff, No LOC, head strike noted.  The pt got herself up and went back into her room and laid in her bed. The pt offers no complaints of pain. Adamantly refusing physical exam standing by her door, blocking writer from entering and saying to be left alone and spat on an RN. The pt denies body pain when asked.  General: Agitated.  VSS  MS: No visible signs of injury.  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 Refusing physical exam. Becoming agitated and violent with nursing staff.   Pt placed on fall precautions. Will order UA to assess for possible UTI induced psychosis. Pt placed on CO by SAUL.

## 2024-03-24 NOTE — BH INPATIENT PSYCHIATRY PROGRESS NOTE - NSBHMSETHTPROC_PSY_A_CORE
Unable to assess
Disorganized/Tangential/Illogical/Impaired reasoning
Disorganized/Tangential/Illogical/Impaired reasoning
Unable to assess

## 2024-03-24 NOTE — BH INPATIENT PSYCHIATRY PROGRESS NOTE - NSBHMSEBEHAV_PSY_A_CORE
initially uncooperative, then answered questions/Other
initially uncooperative, then answered questions/Uncooperative/Hostile
initially uncooperative, then answered questions/Other
initially uncooperative, then answered questions/Uncooperative

## 2024-03-24 NOTE — BH INPATIENT PSYCHIATRY PROGRESS NOTE - NSBHMSETHTCONTENT_PSY_A_CORE
+paranoia/Ideas of reference/Unable to assess
+paranoia/Unable to assess
+paranoia/Unable to assess
+paranoia/Ideas of reference/Unable to assess

## 2024-03-24 NOTE — BH INPATIENT PSYCHIATRY PROGRESS NOTE - NSBHASSESSSUMMFT_PSY_ALL_CORE
2/5 Patient showing some improving trend will give more time, if needs more medication titration due to ongoing symptoms will increase Seroquel not haldol  2/6 Improving cont meds, offered prn suppository and simethicone  2/7 Slowly improving will cont current meds, eval need for further increase Seroquel  2/8 Partial response continue meds except will increase Seroquel as was on 250mg/d PTA  2/9 Overall gains, still regresses and becomes difficult to manage will increase Seroquel to 200mg/d in divided dosing  2/12 A little calmer since started on additional mid day, cont other meds w/o change  2/13 Patient better still disorganized, hyper Christianity but less agitated, cont current treatment  2/14 Improved globally with some symptom variability. Cont current med consider further increment Seroquel  2/15 Improved cont current regimen for now  2/16 Showing  some gains continue treatment for now at current doses   2/20 Improved, cont current rx. Scheduled urogyn for pessary replacement  2/21 Improved with variable intrusiveness. Cont meds will increase Miralax and Senna  2/22 Improving cont current rx, observe for effect of re-titration of laxatives  2/23 Lost balance mainly due to behavior however to be safe will lower haldol and Klonopin modestly and observe. Otherwise generally manageable  2/24: No PRNs needed overnight. Compliant with meds. As per staff last night pt was banging her Bible against the wall and lost her balance, staff held her. Pt remains talkative and intrusive at times but overall calmer and redirectable.   2/25: remains labile, loud, easily agitated but more redirectable.  2/26 Patient seen by medicine no evidence injury needing imaging, just Tylenol prn. Will cont current meds, if anteroflexion dosesn't improve with recent drop in haldol will consider further dose reduction  2/27 Has had slow increase in EPS despite no increase in dose and some reduction will hold haldol to reduce EPS and restart at lower dose  2/28 Patient with change in med tolerance some agitation and sedation. Will hold haldol tonight consider changing to Prolixin as may be a little better for EPS and will check labs to look for any medical issue  2/29 Labile irritable, increased tremor improved with washout of haldol. Will restart haldol but see if she can be stabilized at low dose to minimize tremor. COuld consider increasing Seroquel  3/1 Improved will see if she can stabilize on Seroquel and low dose haldol to minimize EPS will address nasal symptoms  3/2 Patient better less bent tremor less calmer. Attempt to find dose of haldol that control psychosis agitation with manageable tremor. May need to try 2mg bid as 1mg big may be subtherapeutic  3/4 Some improvement in that less agitated but EPS better will cont with low dose haldol Issues is that both side effects and decompenstion have had long lags related to dose changes so unclear if at haldol 1mg bid she is better EPS wise but will gradually decompensate. Cont to observe consider increase to 1mg tid. Patient's 2PC expiring she remains delusional disorganized not yet able to managed in SNF will apply for further retention  3/5 Showing some improvement with less agitation despite delusions but also less EPS with lowered haldol. Cont meds will reduce klonopin from 0.25mg tid to bid to reduce polypharm cont other meds. If calmer less disorganized will ask PT if she can walker trial  3/6: behavior in better control, delusional, suspected AH. taking meds.   3/7: can become irritable, redirectable. c/w current meds, ambulating hunched over.   3/8: went to interventional Urology on Fri, UA ordered, ultrasound of bladder scheduled for Monday at 10 AM,  and ob gyn for pessary ordered. If blood in urine—send back to urology office for cysto (please see the full consult in chart)  3/9: Somewhat sedated this AM, calm, not agitated, will follow and adjust meds as necessary (clonazepam)   3/10: More alert today, suspicious of examiner, UA negative  3/11: Mistrustfulness remains, leading to irritability with staff; no prns given   3/12: Labile and irritability noted, poor insight and requires continued inpatient psychiatric hospitalization for safety and stabilization.  3/13: labile, irritable on approach, redirectable. c/w current meds. Delusions noted on exam.  3/14: Labile and irritability noted, poor insight and requires continued inpatient psychiatric hospitalization for safety and stabilization.  3/15: labile, irritable on approach, redirectable. c/w current meds.  3/18 doing poor on regimen where haldol on low dose ineffective or causes sig tremor and anterflexion on higher doses. Will stop and review alternatives such as prolixin or risperidone  3/19 Patient with less sedation , still sig trremor. Delusional irritable, in altercation with roommate sees her room as belonging solely to her. Cont off haldol consider second antipsychotic has been on olanzapine, Prolixin, risperidone, she believes has not been on Abilify will consider this as trial with option for SHAFER  3/20 Psychotic irritable disorganized may go back to trial of olanzapine to allow for monotherapy and less EPS. Spoke with meiolivaine will lower Valtrex as dose is above recommended for maintenance  3/21 Louder more intrusive. Will titrate olanzapine ands use prns target 20-30mg, may need second antipsychotic given treatment resistance and clozapine not feasible  3/22 Patient sl calmer with po and prn olanzapine still lous intrusive irritable, needing prns. will increase to 5mg tid. Plan when calmer attempt to simplify regimen to bid to improve compliance.  3/23: no PRN overnight/this am. Noted walking in dayroom, calmer, no aggression now.  3/24: s/p fall, no injury, noted walking in dining area, remains hostile, easily agitated and uncooperative.

## 2024-03-24 NOTE — BH INPATIENT PSYCHIATRY PROGRESS NOTE - NSBHCHARTREVIEWVS_PSY_A_CORE FT
Vital Signs Last 24 Hrs  T(C): 36.7 (03-24-24 @ 05:55), Max: 36.7 (03-24-24 @ 05:55)  T(F): 98 (03-24-24 @ 05:55), Max: 98 (03-24-24 @ 05:55)  HR: 88 (03-23-24 @ 14:01) (88 - 88)  BP: --  BP(mean): --  RR: 16 (03-24-24 @ 05:55) (16 - 16)  SpO2: 100% (03-24-24 @ 05:55) (100% - 100%)    Orthostatic VS  03-24-24 @ 05:55  Lying BP: --/-- HR: --  Sitting BP: 141/74 HR: 107  Standing BP: 149/101 HR: 107  Site: --  Mode: --  Orthostatic VS  03-23-24 @ 05:51  Lying BP: --/-- HR: --  Sitting BP: 163/115 HR: 103  Standing BP: 149/83 HR: 131  Site: --  Mode: --  Orthostatic VS  03-22-24 @ 12:17  Lying BP: --/-- HR: --  Sitting BP: 129/82 HR: 93  Standing BP: 145/103 HR: 114  Site: upper left arm  Mode: electronic

## 2024-03-24 NOTE — BH INPATIENT PSYCHIATRY PROGRESS NOTE - NSBHMSESPEECH_PSY_A_CORE
Abnormal as indicated, otherwise normal...
Non-verbal: unable to assess speech further

## 2024-03-24 NOTE — BH INPATIENT PSYCHIATRY PROGRESS NOTE - GENERAL APPEARANCE
wearing mask/No deformities present/Other

## 2024-03-24 NOTE — BH INPATIENT PSYCHIATRY PROGRESS NOTE - ADDITIONAL DETAILS / COMMENTS
Speech hard to understand due to meds and missing teeth

## 2024-03-24 NOTE — BH INPATIENT PSYCHIATRY PROGRESS NOTE - NSBHPSYCHOLCOGORIENT_PSY_A_CORE
Oriented to time, place, person, situation
Unable to assess
Oriented to time, place, person, situation
Unable to assess

## 2024-03-24 NOTE — BH INPATIENT PSYCHIATRY PROGRESS NOTE - NSBHMETABOLIC_PSY_ALL_CORE_FT
BMI: BMI (kg/m2): 30.2 (01-15-24 @ 12:52)  HbA1c: A1C with Estimated Average Glucose Result: 7.6 % (01-12-24 @ 12:40)    Glucose: POCT Blood Glucose.: 188 mg/dL (03-22-24 @ 16:50)    BP: --Vital Signs Last 24 Hrs  T(C): 36.7 (03-24-24 @ 05:55), Max: 36.7 (03-24-24 @ 05:55)  T(F): 98 (03-24-24 @ 05:55), Max: 98 (03-24-24 @ 05:55)  HR: 88 (03-23-24 @ 14:01) (88 - 88)  BP: --  BP(mean): --  RR: 16 (03-24-24 @ 05:55) (16 - 16)  SpO2: 100% (03-24-24 @ 05:55) (100% - 100%)    Orthostatic VS  03-24-24 @ 05:55  Lying BP: --/-- HR: --  Sitting BP: 141/74 HR: 107  Standing BP: 149/101 HR: 107  Site: --  Mode: --  Orthostatic VS  03-23-24 @ 05:51  Lying BP: --/-- HR: --  Sitting BP: 163/115 HR: 103  Standing BP: 149/83 HR: 131  Site: --  Mode: --  Orthostatic VS  03-22-24 @ 12:17  Lying BP: --/-- HR: --  Sitting BP: 129/82 HR: 93  Standing BP: 145/103 HR: 114  Site: upper left arm  Mode: electronic    Lipid Panel:

## 2024-03-24 NOTE — BH INPATIENT PSYCHIATRY PROGRESS NOTE - NSBHFUPINTERVALHXFT_PSY_A_CORE
Pt is seen and evaluated for follow up for schizoaffective disorder. Chart reviewed and discussed with nursing staff. Pt is compliant with medications, had a fall early am, seen by PA, exam was done, received IM zyprexa around 6.15 am, so am zyprexa standing dose was not given. I reviewed PA note and exam. Vitals are stable. Pt is visible in dining area, on CO, irritable on approach, 'who are you?', does not engage in interview. She was noted slamming her bedroom door. Per staff, pt remains labile, irritable, intrusive, loud, verbally aggressive, intimidating with peers and easily agitated.

## 2024-03-25 LAB — GLUCOSE BLDC GLUCOMTR-MCNC: 206 MG/DL — HIGH (ref 70–99)

## 2024-03-25 PROCEDURE — 99232 SBSQ HOSP IP/OBS MODERATE 35: CPT

## 2024-03-25 RX ORDER — CLONAZEPAM 1 MG
0.12 TABLET ORAL
Refills: 0 | Status: DISCONTINUED | OUTPATIENT
Start: 2024-03-25 | End: 2024-03-28

## 2024-03-25 RX ADMIN — OLANZAPINE 5 MILLIGRAM(S): 15 TABLET, FILM COATED ORAL at 09:19

## 2024-03-25 RX ADMIN — DIVALPROEX SODIUM 500 MILLIGRAM(S): 500 TABLET, DELAYED RELEASE ORAL at 09:19

## 2024-03-25 RX ADMIN — POLYETHYLENE GLYCOL 3350 17 GRAM(S): 17 POWDER, FOR SOLUTION ORAL at 09:20

## 2024-03-25 RX ADMIN — Medication 0.12 MILLIGRAM(S): at 22:08

## 2024-03-25 RX ADMIN — Medication 0.12 MILLIGRAM(S): at 09:19

## 2024-03-25 RX ADMIN — METFORMIN HYDROCHLORIDE 500 MILLIGRAM(S): 850 TABLET ORAL at 22:09

## 2024-03-25 RX ADMIN — DIVALPROEX SODIUM 500 MILLIGRAM(S): 500 TABLET, DELAYED RELEASE ORAL at 12:21

## 2024-03-25 RX ADMIN — OLANZAPINE 5 MILLIGRAM(S): 15 TABLET, FILM COATED ORAL at 12:21

## 2024-03-25 RX ADMIN — CARBIDOPA AND LEVODOPA 1 TABLET(S): 25; 100 TABLET ORAL at 22:08

## 2024-03-25 RX ADMIN — OLANZAPINE 5 MILLIGRAM(S): 15 TABLET, FILM COATED ORAL at 22:09

## 2024-03-25 RX ADMIN — CARBIDOPA AND LEVODOPA 1 TABLET(S): 25; 100 TABLET ORAL at 12:22

## 2024-03-25 RX ADMIN — Medication 1 APPLICATION(S): at 09:20

## 2024-03-25 RX ADMIN — Medication 1 TABLET(S): at 22:09

## 2024-03-25 RX ADMIN — DIVALPROEX SODIUM 500 MILLIGRAM(S): 500 TABLET, DELAYED RELEASE ORAL at 22:08

## 2024-03-25 RX ADMIN — LISINOPRIL 20 MILLIGRAM(S): 2.5 TABLET ORAL at 09:19

## 2024-03-25 RX ADMIN — Medication 75 MICROGRAM(S): at 05:29

## 2024-03-25 RX ADMIN — METFORMIN HYDROCHLORIDE 500 MILLIGRAM(S): 850 TABLET ORAL at 09:19

## 2024-03-25 RX ADMIN — Medication 50 MILLIGRAM(S): at 09:19

## 2024-03-25 RX ADMIN — Medication 1 APPLICATION(S): at 22:09

## 2024-03-25 RX ADMIN — Medication 400 UNIT(S): at 09:20

## 2024-03-25 RX ADMIN — OLANZAPINE 5 MILLIGRAM(S): 15 TABLET, FILM COATED ORAL at 04:24

## 2024-03-25 RX ADMIN — Medication 2: at 16:45

## 2024-03-25 RX ADMIN — CARBIDOPA AND LEVODOPA 1 TABLET(S): 25; 100 TABLET ORAL at 09:19

## 2024-03-25 RX ADMIN — Medication 1 APPLICATION(S): at 22:08

## 2024-03-25 RX ADMIN — OLANZAPINE 5 MILLIGRAM(S): 15 TABLET, FILM COATED ORAL at 16:46

## 2024-03-25 RX ADMIN — VALACYCLOVIR 1000 MILLIGRAM(S): 500 TABLET, FILM COATED ORAL at 09:20

## 2024-03-25 RX ADMIN — SENNA PLUS 2 TABLET(S): 8.6 TABLET ORAL at 22:09

## 2024-03-25 NOTE — BH INPATIENT PSYCHIATRY PROGRESS NOTE - NSBHFUPINTERVALHXFT_PSY_A_CORE
Pt is seen and evaluated for follow up for schizoaffective disorder. Chart reviewed and discussed with nursing staff. Pt is compliant with medications. Had two falls w/o injury likely related to disorganized behavior not med se.VSS

## 2024-03-25 NOTE — BH INPATIENT PSYCHIATRY PROGRESS NOTE - MSE UNSTRUCTURED FT
Appearance: casual grooming, no overt deficits in hygiene;  sig tremor, loose at elbows and wrist. Walks with aneteroflexed gait.  Behavior: poorly related dismissive  Speech: loud  Mood: irritable  Affect: congruent, stable, somewhat intense  Thought process: impoverished  Thought content: mistrustfulness noted; denies SI/HI. Restoration delusions, believes someone has stolen two of her Bibles. believes she is [place] likely biblical reference. Some sexual pre-occupation. Focused on diet  Perceptual disturbances: no appearance of internal preoccupation  Cognition: adequately oriented  Insight: limited  Judgement: limited

## 2024-03-25 NOTE — BH INPATIENT PSYCHIATRY PROGRESS NOTE - CURRENT MEDICATION
MEDICATIONS  (STANDING):  ammonium lactate 12% Lotion 1 Application(s) Topical two times a day  carbidopa/levodopa  25/100 1 Tablet(s) Oral three times a day  cholecalciferol 400 Unit(s) Oral daily  clonazePAM Oral Disintegrating Tablet 0.125 milliGRAM(s) Oral two times a day  dextrose 5%. 1000 milliLiter(s) (50 mL/Hr) IV Continuous <Continuous>  dextrose 5%. 1000 milliLiter(s) (100 mL/Hr) IV Continuous <Continuous>  dextrose 50% Injectable 25 Gram(s) IV Push once  dextrose 50% Injectable 12.5 Gram(s) IV Push once  dextrose 50% Injectable 25 Gram(s) IV Push once  divalproex Sprinkle 500 milliGRAM(s) Oral three times a day  glucagon  Injectable 1 milliGRAM(s) IntraMuscular once  hemorrhoidal Ointment 1 Application(s) Rectal daily  hydrocortisone 2.5% Ointment 1 Application(s) Topical two times a day  insulin lispro (ADMELOG) corrective regimen sliding scale   SubCutaneous three times a day before meals  levothyroxine 75 MICROGram(s) Oral daily  lisinopril 20 milliGRAM(s) Oral daily  metFORMIN 500 milliGRAM(s) Oral two times a day  metoprolol succinate ER 50 milliGRAM(s) Oral daily  multivitamin 1 Tablet(s) Oral at bedtime  OLANZapine Disintegrating Tablet 5 milliGRAM(s) Oral three times a day  polyethylene glycol 3350 17 Gram(s) Oral daily  senna 2 Tablet(s) Oral at bedtime  valACYclovir 1000 milliGRAM(s) Oral daily    MEDICATIONS  (PRN):  acetaminophen     Tablet .. 650 milliGRAM(s) Oral every 6 hours PRN Mild Pain (1 - 3), Moderate Pain (4 - 6)  albuterol    90 MICROgram(s) HFA Inhaler 2 Puff(s) Inhalation every 6 hours PRN Shortness of Breath and/or Wheezing  aluminum hydroxide/magnesium hydroxide/simethicone Suspension 30 milliLiter(s) Oral every 6 hours PRN Dyspepsia  bisacodyl Suppository 10 milliGRAM(s) Rectal daily PRN constipation  dextrose Oral Gel 15 Gram(s) Oral once PRN Blood Glucose LESS THAN 70 milliGRAM(s)/deciliter  OLANZapine Disintegrating Tablet 5 milliGRAM(s) Oral every 8 hours PRN agitation  OLANZapine Injectable 5 milliGRAM(s) IntraMuscular once PRN severe agitation  simethicone 80 milliGRAM(s) Chew every 8 hours PRN gas  sodium chloride 0.65% Nasal 2 Spray(s) Both Nostrils every 6 hours PRN congestion

## 2024-03-25 NOTE — BH INPATIENT PSYCHIATRY PROGRESS NOTE - NSBHCHARTREVIEWVS_PSY_A_CORE FT
Vital Signs Last 24 Hrs  T(C): --  T(F): --  HR: --  BP: --  BP(mean): --  RR: --  SpO2: --    Orthostatic VS  03-25-24 @ 09:00  Lying BP: --/-- HR: --  Sitting BP: 138/86 HR: 101  Standing BP: 148/97 HR: 106  Site: --  Mode: --  Orthostatic VS  03-24-24 @ 05:55  Lying BP: --/-- HR: --  Sitting BP: 141/74 HR: 107  Standing BP: 149/101 HR: 107  Site: --  Mode: --

## 2024-03-25 NOTE — BH INPATIENT PSYCHIATRY PROGRESS NOTE - NSBHASSESSSUMMFT_PSY_ALL_CORE
2/5 Patient showing some improving trend will give more time, if needs more medication titration due to ongoing symptoms will increase Seroquel not haldol  2/6 Improving cont meds, offered prn suppository and simethicone  2/7 Slowly improving will cont current meds, eval need for further increase Seroquel  2/8 Partial response continue meds except will increase Seroquel as was on 250mg/d PTA  2/9 Overall gains, still regresses and becomes difficult to manage will increase Seroquel to 200mg/d in divided dosing  2/12 A little calmer since started on additional mid day, cont other meds w/o change  2/13 Patient better still disorganized, hyper [employer] but less agitated, cont current treatment  2/14 Improved globally with some symptom variability. Cont current med consider further increment Seroquel  2/15 Improved cont current regimen for now  2/16 Showing  some gains continue treatment for now at current doses   2/20 Improved, cont current rx. Scheduled urogyn for pessary replacement  2/21 Improved with variable intrusiveness. Cont meds will increase Miralax and Senna  2/22 Improving cont current rx, observe for effect of re-titration of laxatives  2/23 Lost balance mainly due to behavior however to be safe will lower haldol and Klonopin modestly and observe. Otherwise generally manageable  2/24: No PRNs needed overnight. Compliant with meds. As per staff last night pt was banging her Bible against the wall and lost her balance, staff held her. Pt remains talkative and intrusive at times but overall calmer and redirectable.   2/25: remains labile, loud, easily agitated but more redirectable.  2/26 Patient seen by medicine no evidence injury needing imaging, just Tylenol prn. Will cont current meds, if anteroflexion dosesn't improve with recent drop in haldol will consider further dose reduction  2/27 Has had slow increase in EPS despite no increase in dose and some reduction will hold haldol to reduce EPS and restart at lower dose  2/28 Patient with change in med tolerance some agitation and sedation. Will hold haldol tonight consider changing to Prolixin as may be a little better for EPS and will check labs to look for any medical issue  2/29 Labile irritable, increased tremor improved with washout of haldol. Will restart haldol but see if she can be stabilized at low dose to minimize tremor. COuld consider increasing Seroquel  3/1 Improved will see if she can stabilize on Seroquel and low dose haldol to minimize EPS will address nasal symptoms  3/2 Patient better less bent tremor less calmer. Attempt to find dose of haldol that control psychosis agitation with manageable tremor. May need to try 2mg bid as 1mg big may be subtherapeutic  3/4 Some improvement in that less agitated but EPS better will cont with low dose haldol Issues is that both side effects and decompenstion have had long lags related to dose changes so unclear if at haldol 1mg bid she is better EPS wise but will gradually decompensate. Cont to observe consider increase to 1mg tid. Patient's 2PC expiring she remains delusional disorganized not yet able to managed in SNF will apply for further retention  3/5 Showing some improvement with less agitation despite delusions but also less EPS with lowered haldol. Cont meds will reduce klonopin from 0.25mg tid to bid to reduce polypharm cont other meds. If calmer less disorganized will ask PT if she can walker trial  3/6: behavior in better control, delusional, suspected AH. taking meds.   3/7: can become irritable, redirectable. c/w current meds, ambulating hunched over.   3/8: went to interventional Urology on Fri, UA ordered, ultrasound of bladder scheduled for Monday at 10 AM,  and ob gyn for pessary ordered. If blood in urine—send back to urology office for cysto (please see the full consult in chart)  3/9: Somewhat sedated this AM, calm, not agitated, will follow and adjust meds as necessary (clonazepam)   3/10: More alert today, suspicious of examiner, UA negative  3/11: Mistrustfulness remains, leading to irritability with staff; no prns given   3/12: Labile and irritability noted, poor insight and requires continued inpatient psychiatric hospitalization for safety and stabilization.  3/13: labile, irritable on approach, redirectable. c/w current meds. Delusions noted on exam.  3/14: Labile and irritability noted, poor insight and requires continued inpatient psychiatric hospitalization for safety and stabilization.  3/15: labile, irritable on approach, redirectable. c/w current meds.  3/18 doing poor on regimen where haldol on low dose ineffective or causes sig tremor and anterflexion on higher doses. Will stop and review alternatives such as prolixin or risperidone  3/19 Patient with less sedation , still sig trremor. Delusional irritable, in altercation with roommate sees her room as belonging solely to her. Cont off haldol consider second antipsychotic has been on olanzapine, Prolixin, risperidone, she believes has not been on Abilify will consider this as trial with option for SHAFER  3/20 Psychotic irritable disorganized may go back to trial of olanzapine to allow for monotherapy and less EPS. Spoke with nayanine will lower Valtrex as dose is above recommended for maintenance  3/21 Louder more intrusive. Will titrate olanzapine ands use prns target 20-30mg, may need second antipsychotic given treatment resistance and clozapine not feasible  3/22 Patient sl calmer with po and prn olanzapine still lous intrusive irritable, needing prns. will increase to 5mg tid. Plan when calmer attempt to simplify regimen to bid to improve compliance.  3/23: no PRN overnight/this am. Noted walking in dayroom, calmer, no aggression now.  3/24: s/p fall, no injury, noted walking in dining area, remains hostile, easily agitated and uncooperative.   3/25 Patient loud irritable  but somewhat less disorganized. Seen by PT not assessed as sig Parkinsonized. will cont meds, will try to check VPA level in AM. Feel due to reduced disorganization patient no longer requires  will place on routine checks , will check labs including VPa level in AM

## 2024-03-25 NOTE — BH INPATIENT PSYCHIATRY PROGRESS NOTE - NSBHMETABOLIC_PSY_ALL_CORE_FT
BMI: BMI (kg/m2): 30.2 (01-15-24 @ 12:52)  HbA1c: A1C with Estimated Average Glucose Result: 7.6 % (01-12-24 @ 12:40)    Glucose: POCT Blood Glucose.: 123 mg/dL (03-24-24 @ 16:16)    BP: --Vital Signs Last 24 Hrs  T(C): --  T(F): --  HR: --  BP: --  BP(mean): --  RR: --  SpO2: --    Orthostatic VS  03-25-24 @ 09:00  Lying BP: --/-- HR: --  Sitting BP: 138/86 HR: 101  Standing BP: 148/97 HR: 106  Site: --  Mode: --  Orthostatic VS  03-24-24 @ 05:55  Lying BP: --/-- HR: --  Sitting BP: 141/74 HR: 107  Standing BP: 149/101 HR: 107  Site: --  Mode: --    Lipid Panel:

## 2024-03-26 PROCEDURE — 99232 SBSQ HOSP IP/OBS MODERATE 35: CPT

## 2024-03-26 RX ADMIN — DIVALPROEX SODIUM 500 MILLIGRAM(S): 500 TABLET, DELAYED RELEASE ORAL at 21:35

## 2024-03-26 RX ADMIN — METFORMIN HYDROCHLORIDE 500 MILLIGRAM(S): 850 TABLET ORAL at 21:35

## 2024-03-26 RX ADMIN — LISINOPRIL 20 MILLIGRAM(S): 2.5 TABLET ORAL at 09:07

## 2024-03-26 RX ADMIN — Medication 1 TABLET(S): at 21:35

## 2024-03-26 RX ADMIN — Medication 50 MILLIGRAM(S): at 09:07

## 2024-03-26 RX ADMIN — CARBIDOPA AND LEVODOPA 1 TABLET(S): 25; 100 TABLET ORAL at 21:34

## 2024-03-26 RX ADMIN — Medication 75 MICROGRAM(S): at 05:22

## 2024-03-26 RX ADMIN — METFORMIN HYDROCHLORIDE 500 MILLIGRAM(S): 850 TABLET ORAL at 09:07

## 2024-03-26 RX ADMIN — DIVALPROEX SODIUM 500 MILLIGRAM(S): 500 TABLET, DELAYED RELEASE ORAL at 09:06

## 2024-03-26 RX ADMIN — OLANZAPINE 5 MILLIGRAM(S): 15 TABLET, FILM COATED ORAL at 13:03

## 2024-03-26 RX ADMIN — OLANZAPINE 5 MILLIGRAM(S): 15 TABLET, FILM COATED ORAL at 21:34

## 2024-03-26 RX ADMIN — Medication 0.12 MILLIGRAM(S): at 09:06

## 2024-03-26 RX ADMIN — POLYETHYLENE GLYCOL 3350 17 GRAM(S): 17 POWDER, FOR SOLUTION ORAL at 09:05

## 2024-03-26 RX ADMIN — DIVALPROEX SODIUM 500 MILLIGRAM(S): 500 TABLET, DELAYED RELEASE ORAL at 13:03

## 2024-03-26 RX ADMIN — CARBIDOPA AND LEVODOPA 1 TABLET(S): 25; 100 TABLET ORAL at 13:03

## 2024-03-26 RX ADMIN — Medication 400 UNIT(S): at 09:06

## 2024-03-26 RX ADMIN — Medication 0.12 MILLIGRAM(S): at 21:34

## 2024-03-26 RX ADMIN — VALACYCLOVIR 1000 MILLIGRAM(S): 500 TABLET, FILM COATED ORAL at 09:05

## 2024-03-26 RX ADMIN — OLANZAPINE 5 MILLIGRAM(S): 15 TABLET, FILM COATED ORAL at 09:10

## 2024-03-26 RX ADMIN — CARBIDOPA AND LEVODOPA 1 TABLET(S): 25; 100 TABLET ORAL at 09:07

## 2024-03-26 RX ADMIN — SENNA PLUS 2 TABLET(S): 8.6 TABLET ORAL at 21:35
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
## 2024-03-26 NOTE — BH INPATIENT PSYCHIATRY PROGRESS NOTE - NSBHCHARTREVIEWVS_PSY_A_CORE FT
Vital Signs Last 24 Hrs  T(C): 36.9 (03-26-24 @ 06:02), Max: 36.9 (03-26-24 @ 06:02)  T(F): 98.5 (03-26-24 @ 06:02), Max: 98.5 (03-26-24 @ 06:02)  HR: 95 (03-26-24 @ 06:02) (95 - 95)  BP: 134/67 (03-26-24 @ 06:02) (134/67 - 134/67)  BP(mean): --  RR: --  SpO2: 100% (03-26-24 @ 06:02) (100% - 100%)    Orthostatic VS  03-25-24 @ 09:00  Lying BP: --/-- HR: --  Sitting BP: 138/86 HR: 101  Standing BP: 148/97 HR: 106  Site: --  Mode: --

## 2024-03-26 NOTE — BH INPATIENT PSYCHIATRY PROGRESS NOTE - MSE UNSTRUCTURED FT
Appearance: casual grooming, no overt deficits in hygiene;  sig tremor, loose at elbows and wrist. Walks with aneteroflexed gait.  Behavior: poorly related dismissive  Speech: loud  Mood: irritable  Affect: congruent, stable, somewhat intense  Thought process: impoverished  Thought content: paranoid accusatory denies SI/HI. Taoist delusions. She believes she is [place] Barragan likely biblical reference. Some sexual pre-occupation. Focused on diet  Perceptual disturbances: no appearance of internal preoccupation  Cognition: adequately oriented  Insight: limited  Judgement: limited

## 2024-03-26 NOTE — BH INPATIENT PSYCHIATRY PROGRESS NOTE - NSBHFUPINTERVALHXFT_PSY_A_CORE
Pt is seen and evaluated for follow up for schizoaffective disorder. Chart reviewed and discussed with nursing staff. Pt is compliant with medications. VSS. Refused labs. Aptient intrusive irritable, not aggressive

## 2024-03-26 NOTE — BH INPATIENT PSYCHIATRY PROGRESS NOTE - NSBHMETABOLIC_PSY_ALL_CORE_FT
BMI: BMI (kg/m2): 30.2 (01-15-24 @ 12:52)  HbA1c: A1C with Estimated Average Glucose Result: 7.6 % (01-12-24 @ 12:40)    Glucose: POCT Blood Glucose.: 206 mg/dL (03-25-24 @ 16:35)    BP: 134/67 (03-26-24 @ 06:02) (134/67 - 134/67)Vital Signs Last 24 Hrs  T(C): 36.9 (03-26-24 @ 06:02), Max: 36.9 (03-26-24 @ 06:02)  T(F): 98.5 (03-26-24 @ 06:02), Max: 98.5 (03-26-24 @ 06:02)  HR: 95 (03-26-24 @ 06:02) (95 - 95)  BP: 134/67 (03-26-24 @ 06:02) (134/67 - 134/67)  BP(mean): --  RR: --  SpO2: 100% (03-26-24 @ 06:02) (100% - 100%)    Orthostatic VS  03-25-24 @ 09:00  Lying BP: --/-- HR: --  Sitting BP: 138/86 HR: 101  Standing BP: 148/97 HR: 106  Site: --  Mode: --    Lipid Panel:

## 2024-03-27 LAB — GLUCOSE BLDC GLUCOMTR-MCNC: 139 MG/DL — HIGH (ref 70–99)

## 2024-03-27 PROCEDURE — 99232 SBSQ HOSP IP/OBS MODERATE 35: CPT

## 2024-03-27 RX ORDER — OLANZAPINE 15 MG/1
5 TABLET, FILM COATED ORAL AT BEDTIME
Refills: 0 | Status: DISCONTINUED | OUTPATIENT
Start: 2024-03-27 | End: 2024-03-28

## 2024-03-27 RX ORDER — CARBIDOPA AND LEVODOPA 25; 100 MG/1; MG/1
1.5 TABLET ORAL
Refills: 0 | Status: DISCONTINUED | OUTPATIENT
Start: 2024-03-27 | End: 2024-05-17

## 2024-03-27 RX ORDER — OLANZAPINE 15 MG/1
10 TABLET, FILM COATED ORAL DAILY
Refills: 0 | Status: DISCONTINUED | OUTPATIENT
Start: 2024-03-27 | End: 2024-04-01

## 2024-03-27 RX ORDER — DIVALPROEX SODIUM 500 MG/1
750 TABLET, DELAYED RELEASE ORAL
Refills: 0 | Status: DISCONTINUED | OUTPATIENT
Start: 2024-03-27 | End: 2024-05-17

## 2024-03-27 RX ADMIN — OLANZAPINE 5 MILLIGRAM(S): 15 TABLET, FILM COATED ORAL at 23:34

## 2024-03-27 RX ADMIN — SENNA PLUS 2 TABLET(S): 8.6 TABLET ORAL at 20:49

## 2024-03-27 RX ADMIN — LISINOPRIL 20 MILLIGRAM(S): 2.5 TABLET ORAL at 10:07

## 2024-03-27 RX ADMIN — CARBIDOPA AND LEVODOPA 1 TABLET(S): 25; 100 TABLET ORAL at 10:07

## 2024-03-27 RX ADMIN — Medication 1 APPLICATION(S): at 20:49

## 2024-03-27 RX ADMIN — VALACYCLOVIR 1000 MILLIGRAM(S): 500 TABLET, FILM COATED ORAL at 10:06

## 2024-03-27 RX ADMIN — CARBIDOPA AND LEVODOPA 1.5 TABLET(S): 25; 100 TABLET ORAL at 20:48

## 2024-03-27 RX ADMIN — METFORMIN HYDROCHLORIDE 500 MILLIGRAM(S): 850 TABLET ORAL at 20:49

## 2024-03-27 RX ADMIN — Medication 400 UNIT(S): at 10:07

## 2024-03-27 RX ADMIN — DIVALPROEX SODIUM 750 MILLIGRAM(S): 500 TABLET, DELAYED RELEASE ORAL at 20:48

## 2024-03-27 RX ADMIN — METFORMIN HYDROCHLORIDE 500 MILLIGRAM(S): 850 TABLET ORAL at 10:07

## 2024-03-27 RX ADMIN — Medication 0.12 MILLIGRAM(S): at 10:07

## 2024-03-27 RX ADMIN — Medication 50 MILLIGRAM(S): at 10:07

## 2024-03-27 RX ADMIN — Medication 1 APPLICATION(S): at 10:07

## 2024-03-27 RX ADMIN — Medication 1 TABLET(S): at 20:48

## 2024-03-27 RX ADMIN — OLANZAPINE 5 MILLIGRAM(S): 15 TABLET, FILM COATED ORAL at 10:07

## 2024-03-27 RX ADMIN — OLANZAPINE 5 MILLIGRAM(S): 15 TABLET, FILM COATED ORAL at 20:51

## 2024-03-27 RX ADMIN — DIVALPROEX SODIUM 500 MILLIGRAM(S): 500 TABLET, DELAYED RELEASE ORAL at 10:06

## 2024-03-27 RX ADMIN — Medication 0.12 MILLIGRAM(S): at 20:20
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
## 2024-03-27 NOTE — BH INPATIENT PSYCHIATRY PROGRESS NOTE - MSE UNSTRUCTURED FT
Appearance: casual grooming, no overt deficits in hygiene;  sig tremor, loose at elbows and wrist. Walks with aneteroflexed gait.  Behavior: poorly related dismissive  Speech: loud, some coprolalia  Mood: irritable  Affect: congruent, stable, somewhat intense  Thought process: impoverished  Thought content: paranoid accusatory denies SI/HI. Voodoo delusions. She believes she is [place] Barragan likely biblical reference.Focused on diet  Perceptual disturbances: no appearance of internal preoccupation  Cognition: adequately oriented  Insight: limited  Judgement: limited

## 2024-03-27 NOTE — BH INPATIENT PSYCHIATRY PROGRESS NOTE - CURRENT MEDICATION
MEDICATIONS  (STANDING):  ammonium lactate 12% Lotion 1 Application(s) Topical two times a day  carbidopa/levodopa  25/100 1.5 Tablet(s) Oral <User Schedule>  cholecalciferol 400 Unit(s) Oral daily  clonazePAM Oral Disintegrating Tablet 0.125 milliGRAM(s) Oral two times a day  dextrose 5%. 1000 milliLiter(s) (100 mL/Hr) IV Continuous <Continuous>  dextrose 5%. 1000 milliLiter(s) (50 mL/Hr) IV Continuous <Continuous>  dextrose 50% Injectable 25 Gram(s) IV Push once  dextrose 50% Injectable 12.5 Gram(s) IV Push once  dextrose 50% Injectable 25 Gram(s) IV Push once  divalproex Sprinkle 750 milliGRAM(s) Oral two times a day  glucagon  Injectable 1 milliGRAM(s) IntraMuscular once  hemorrhoidal Ointment 1 Application(s) Rectal daily  hydrocortisone 2.5% Ointment 1 Application(s) Topical two times a day  insulin lispro (ADMELOG) corrective regimen sliding scale   SubCutaneous three times a day before meals  levothyroxine 75 MICROGram(s) Oral daily  lisinopril 20 milliGRAM(s) Oral daily  metFORMIN 500 milliGRAM(s) Oral two times a day  metoprolol succinate ER 50 milliGRAM(s) Oral daily  multivitamin 1 Tablet(s) Oral at bedtime  OLANZapine Disintegrating Tablet 5 milliGRAM(s) Oral at bedtime  OLANZapine Disintegrating Tablet 10 milliGRAM(s) Oral daily  polyethylene glycol 3350 17 Gram(s) Oral daily  senna 2 Tablet(s) Oral at bedtime  valACYclovir 1000 milliGRAM(s) Oral daily    MEDICATIONS  (PRN):  acetaminophen     Tablet .. 650 milliGRAM(s) Oral every 6 hours PRN Mild Pain (1 - 3), Moderate Pain (4 - 6)  albuterol    90 MICROgram(s) HFA Inhaler 2 Puff(s) Inhalation every 6 hours PRN Shortness of Breath and/or Wheezing  aluminum hydroxide/magnesium hydroxide/simethicone Suspension 30 milliLiter(s) Oral every 6 hours PRN Dyspepsia  bisacodyl Suppository 10 milliGRAM(s) Rectal daily PRN constipation  dextrose Oral Gel 15 Gram(s) Oral once PRN Blood Glucose LESS THAN 70 milliGRAM(s)/deciliter  OLANZapine Disintegrating Tablet 5 milliGRAM(s) Oral every 8 hours PRN agitation  OLANZapine Injectable 5 milliGRAM(s) IntraMuscular once PRN severe agitation  simethicone 80 milliGRAM(s) Chew every 8 hours PRN gas  sodium chloride 0.65% Nasal 2 Spray(s) Both Nostrils every 6 hours PRN congestion

## 2024-03-27 NOTE — BH INPATIENT PSYCHIATRY PROGRESS NOTE - NSBHCHARTREVIEWVS_PSY_A_CORE FT
Vital Signs Last 24 Hrs  T(C): --  T(F): --  HR: 99 (03-27-24 @ 10:18) (99 - 99)  BP: 155/79 (03-27-24 @ 10:18) (155/79 - 155/79)  BP(mean): --  RR: --  SpO2: --

## 2024-03-27 NOTE — BH INPATIENT PSYCHIATRY PROGRESS NOTE - NSBHFUPINTERVALHXFT_PSY_A_CORE
Pt is seen and evaluated for follow up for schizoaffective disorder. Chart reviewed and discussed with nursing staff. Pt is compliant with medications. VSS. Patient intrusive irritable, not aggressive, loud somewhat less disorganized

## 2024-03-27 NOTE — BH INPATIENT PSYCHIATRY PROGRESS NOTE - NSBHMETABOLIC_PSY_ALL_CORE_FT
BMI: BMI (kg/m2): 30.2 (01-15-24 @ 12:52)  HbA1c: A1C with Estimated Average Glucose Result: 7.6 % (01-12-24 @ 12:40)    Glucose: POCT Blood Glucose.: 139 mg/dL (03-27-24 @ 08:17)    BP: 155/79 (03-27-24 @ 10:18) (134/67 - 155/79)Vital Signs Last 24 Hrs  T(C): --  T(F): --  HR: 99 (03-27-24 @ 10:18) (99 - 99)  BP: 155/79 (03-27-24 @ 10:18) (155/79 - 155/79)  BP(mean): --  RR: --  SpO2: --      Lipid Panel:

## 2024-03-28 PROCEDURE — 99232 SBSQ HOSP IP/OBS MODERATE 35: CPT

## 2024-03-28 RX ORDER — OLANZAPINE 15 MG/1
10 TABLET, FILM COATED ORAL AT BEDTIME
Refills: 0 | Status: DISCONTINUED | OUTPATIENT
Start: 2024-03-28 | End: 2024-04-01

## 2024-03-28 RX ORDER — OLANZAPINE 15 MG/1
5 TABLET, FILM COATED ORAL ONCE
Refills: 0 | Status: DISCONTINUED | OUTPATIENT
Start: 2024-03-28 | End: 2024-03-30

## 2024-03-28 RX ORDER — OLANZAPINE 15 MG/1
5 TABLET, FILM COATED ORAL EVERY 8 HOURS
Refills: 0 | Status: DISCONTINUED | OUTPATIENT
Start: 2024-03-28 | End: 2024-03-30

## 2024-03-28 RX ADMIN — OLANZAPINE 10 MILLIGRAM(S): 15 TABLET, FILM COATED ORAL at 21:43

## 2024-03-28 RX ADMIN — CARBIDOPA AND LEVODOPA 1.5 TABLET(S): 25; 100 TABLET ORAL at 08:50

## 2024-03-28 RX ADMIN — METFORMIN HYDROCHLORIDE 500 MILLIGRAM(S): 850 TABLET ORAL at 21:41

## 2024-03-28 RX ADMIN — DIVALPROEX SODIUM 750 MILLIGRAM(S): 500 TABLET, DELAYED RELEASE ORAL at 08:50

## 2024-03-28 RX ADMIN — Medication 1 APPLICATION(S): at 22:43

## 2024-03-28 RX ADMIN — SENNA PLUS 2 TABLET(S): 8.6 TABLET ORAL at 21:42

## 2024-03-28 RX ADMIN — DIVALPROEX SODIUM 750 MILLIGRAM(S): 500 TABLET, DELAYED RELEASE ORAL at 21:42

## 2024-03-28 RX ADMIN — VALACYCLOVIR 1000 MILLIGRAM(S): 500 TABLET, FILM COATED ORAL at 08:51

## 2024-03-28 RX ADMIN — Medication 0.12 MILLIGRAM(S): at 08:50

## 2024-03-28 RX ADMIN — CARBIDOPA AND LEVODOPA 1.5 TABLET(S): 25; 100 TABLET ORAL at 21:42

## 2024-03-28 RX ADMIN — Medication 400 UNIT(S): at 08:51

## 2024-03-28 RX ADMIN — Medication 50 MILLIGRAM(S): at 08:50

## 2024-03-28 RX ADMIN — Medication 1 APPLICATION(S): at 21:41

## 2024-03-28 RX ADMIN — LISINOPRIL 20 MILLIGRAM(S): 2.5 TABLET ORAL at 08:51

## 2024-03-28 RX ADMIN — OLANZAPINE 10 MILLIGRAM(S): 15 TABLET, FILM COATED ORAL at 08:51

## 2024-03-28 RX ADMIN — POLYETHYLENE GLYCOL 3350 17 GRAM(S): 17 POWDER, FOR SOLUTION ORAL at 08:52

## 2024-03-28 RX ADMIN — METFORMIN HYDROCHLORIDE 500 MILLIGRAM(S): 850 TABLET ORAL at 08:52

## 2024-03-28 RX ADMIN — Medication 1 TABLET(S): at 21:43

## 2024-03-28 NOTE — BH INPATIENT PSYCHIATRY PROGRESS NOTE - CURRENT MEDICATION
MEDICATIONS  (STANDING):  ammonium lactate 12% Lotion 1 Application(s) Topical two times a day  carbidopa/levodopa  25/100 1.5 Tablet(s) Oral <User Schedule>  cholecalciferol 400 Unit(s) Oral daily  dextrose 5%. 1000 milliLiter(s) (50 mL/Hr) IV Continuous <Continuous>  dextrose 5%. 1000 milliLiter(s) (100 mL/Hr) IV Continuous <Continuous>  dextrose 50% Injectable 25 Gram(s) IV Push once  dextrose 50% Injectable 25 Gram(s) IV Push once  dextrose 50% Injectable 12.5 Gram(s) IV Push once  divalproex Sprinkle 750 milliGRAM(s) Oral two times a day  glucagon  Injectable 1 milliGRAM(s) IntraMuscular once  hemorrhoidal Ointment 1 Application(s) Rectal daily  hydrocortisone 2.5% Ointment 1 Application(s) Topical two times a day  insulin lispro (ADMELOG) corrective regimen sliding scale   SubCutaneous three times a day before meals  levothyroxine 75 MICROGram(s) Oral daily  lisinopril 20 milliGRAM(s) Oral daily  metFORMIN 500 milliGRAM(s) Oral two times a day  metoprolol succinate ER 50 milliGRAM(s) Oral daily  multivitamin 1 Tablet(s) Oral at bedtime  OLANZapine Disintegrating Tablet 10 milliGRAM(s) Oral daily  OLANZapine Disintegrating Tablet 10 milliGRAM(s) Oral at bedtime  polyethylene glycol 3350 17 Gram(s) Oral daily  senna 2 Tablet(s) Oral at bedtime  valACYclovir 1000 milliGRAM(s) Oral daily    MEDICATIONS  (PRN):  acetaminophen     Tablet .. 650 milliGRAM(s) Oral every 6 hours PRN Mild Pain (1 - 3), Moderate Pain (4 - 6)  albuterol    90 MICROgram(s) HFA Inhaler 2 Puff(s) Inhalation every 6 hours PRN Shortness of Breath and/or Wheezing  aluminum hydroxide/magnesium hydroxide/simethicone Suspension 30 milliLiter(s) Oral every 6 hours PRN Dyspepsia  bisacodyl Suppository 10 milliGRAM(s) Rectal daily PRN constipation  dextrose Oral Gel 15 Gram(s) Oral once PRN Blood Glucose LESS THAN 70 milliGRAM(s)/deciliter  OLANZapine Disintegrating Tablet 5 milliGRAM(s) Oral every 8 hours PRN agitation  OLANZapine Injectable 5 milliGRAM(s) IntraMuscular once PRN severe agitation  simethicone 80 milliGRAM(s) Chew every 8 hours PRN gas  sodium chloride 0.65% Nasal 2 Spray(s) Both Nostrils every 6 hours PRN congestion

## 2024-03-28 NOTE — BH INPATIENT PSYCHIATRY PROGRESS NOTE - NSBHCHARTREVIEWVS_PSY_A_CORE FT
Vital Signs Last 24 Hrs  T(C): 36.2 (03-28-24 @ 05:56), Max: 36.2 (03-28-24 @ 05:56)  T(F): 97.2 (03-28-24 @ 05:56), Max: 97.2 (03-28-24 @ 05:56)  HR: --  BP: --  BP(mean): --  RR: --  SpO2: --    Orthostatic VS  03-28-24 @ 05:56  Lying BP: --/-- HR: --  Sitting BP: 162/92 HR: 113  Standing BP: 165/90 HR: 94  Site: --  Mode: --

## 2024-03-28 NOTE — BH INPATIENT PSYCHIATRY PROGRESS NOTE - NSBHMETABOLIC_PSY_ALL_CORE_FT
BMI: BMI (kg/m2): 30.2 (01-15-24 @ 12:52)  HbA1c: A1C with Estimated Average Glucose Result: 7.6 % (01-12-24 @ 12:40)    Glucose: POCT Blood Glucose.: 139 mg/dL (03-27-24 @ 08:17)    BP: 155/79 (03-27-24 @ 10:18) (134/67 - 155/79)Vital Signs Last 24 Hrs  T(C): 36.2 (03-28-24 @ 05:56), Max: 36.2 (03-28-24 @ 05:56)  T(F): 97.2 (03-28-24 @ 05:56), Max: 97.2 (03-28-24 @ 05:56)  HR: --  BP: --  BP(mean): --  RR: --  SpO2: --    Orthostatic VS  03-28-24 @ 05:56  Lying BP: --/-- HR: --  Sitting BP: 162/92 HR: 113  Standing BP: 165/90 HR: 94  Site: --  Mode: --    Lipid Panel:

## 2024-03-28 NOTE — BH INPATIENT PSYCHIATRY PROGRESS NOTE - MSE UNSTRUCTURED FT
Appearance: casual grooming, no overt deficits in hygiene;  sig tremor, loose at elbows and wrist. Walks with aneteroflexed gait.  Behavior: poorly related dismissive  Speech: loud, some coprolalia  Mood: irritable  Affect: congruent, stable, somewhat intense  Thought process: impoverished  Thought content: paranoid accusatory denies SI/HI. Anabaptist delusions. She believes she is [place] Barragan likely biblical reference.Focused on diet  Perceptual disturbances: no appearance of internal preoccupation  Cognition: adequately oriented  Insight: limited  Judgement: limited

## 2024-03-28 NOTE — BH INPATIENT PSYCHIATRY PROGRESS NOTE - NSBHFUPINTERVALHXFT_PSY_A_CORE
Pt is seen and evaluated for follow up for schizoaffective disorder. Chart reviewed and discussed with nursing staff. Pt is compliant with medications. VSS. Patient intrusive irritable, not aggressive, loud somewhat less disorganized except received prn last night for agitation

## 2024-03-28 NOTE — BH INPATIENT PSYCHIATRY PROGRESS NOTE - NSBHASSESSSUMMFT_PSY_ALL_CORE
2/5 Patient showing some improving trend will give more time, if needs more medication titration due to ongoing symptoms will increase Seroquel not haldol  2/6 Improving cont meds, offered prn suppository and simethicone  2/7 Slowly improving will cont current meds, eval need for further increase Seroquel  2/8 Partial response continue meds except will increase Seroquel as was on 250mg/d PTA  2/9 Overall gains, still regresses and becomes difficult to manage will increase Seroquel to 200mg/d in divided dosing  2/12 A little calmer since started on additional mid day, cont other meds w/o change  2/13 Patient better still disorganized, hyper Adventism but less agitated, cont current treatment  2/14 Improved globally with some symptom variability. Cont current med consider further increment Seroquel  2/15 Improved cont current regimen for now  2/16 Showing  some gains continue treatment for now at current doses   2/20 Improved, cont current rx. Scheduled urogyn for pessary replacement  2/21 Improved with variable intrusiveness. Cont meds will increase Miralax and Senna  2/22 Improving cont current rx, observe for effect of re-titration of laxatives  2/23 Lost balance mainly due to behavior however to be safe will lower haldol and Klonopin modestly and observe. Otherwise generally manageable  2/24: No PRNs needed overnight. Compliant with meds. As per staff last night pt was banging her Bible against the wall and lost her balance, staff held her. Pt remains talkative and intrusive at times but overall calmer and redirectable.   2/25: remains labile, loud, easily agitated but more redirectable.  2/26 Patient seen by medicine no evidence injury needing imaging, just Tylenol prn. Will cont current meds, if anteroflexion dosesn't improve with recent drop in haldol will consider further dose reduction  2/27 Has had slow increase in EPS despite no increase in dose and some reduction will hold haldol to reduce EPS and restart at lower dose  2/28 Patient with change in med tolerance some agitation and sedation. Will hold haldol tonight consider changing to Prolixin as may be a little better for EPS and will check labs to look for any medical issue  2/29 Labile irritable, increased tremor improved with washout of haldol. Will restart haldol but see if she can be stabilized at low dose to minimize tremor. COuld consider increasing Seroquel  3/1 Improved will see if she can stabilize on Seroquel and low dose haldol to minimize EPS will address nasal symptoms  3/2 Patient better less bent tremor less calmer. Attempt to find dose of haldol that control psychosis agitation with manageable tremor. May need to try 2mg bid as 1mg big may be subtherapeutic  3/4 Some improvement in that less agitated but EPS better will cont with low dose haldol Issues is that both side effects and decompenstion have had long lags related to dose changes so unclear if at haldol 1mg bid she is better EPS wise but will gradually decompensate. Cont to observe consider increase to 1mg tid. Patient's 2PC expiring she remains delusional disorganized not yet able to managed in SNF will apply for further retention  3/5 Showing some improvement with less agitation despite delusions but also less EPS with lowered haldol. Cont meds will reduce klonopin from 0.25mg tid to bid to reduce polypharm cont other meds. If calmer less disorganized will ask PT if she can walker trial  3/6: behavior in better control, delusional, suspected AH. taking meds.   3/7: can become irritable, redirectable. c/w current meds, ambulating hunched over.   3/8: went to interventional Urology on Fri, UA ordered, ultrasound of bladder scheduled for Monday at 10 AM,  and ob gyn for pessary ordered. If blood in urine—send back to urology office for cysto (please see the full consult in chart)  3/9: Somewhat sedated this AM, calm, not agitated, will follow and adjust meds as necessary (clonazepam)   3/10: More alert today, suspicious of examiner, UA negative  3/11: Mistrustfulness remains, leading to irritability with staff; no prns given   3/12: Labile and irritability noted, poor insight and requires continued inpatient psychiatric hospitalization for safety and stabilization.  3/13: labile, irritable on approach, redirectable. c/w current meds. Delusions noted on exam.  3/14: Labile and irritability noted, poor insight and requires continued inpatient psychiatric hospitalization for safety and stabilization.  3/15: labile, irritable on approach, redirectable. c/w current meds.  3/18 doing poor on regimen where haldol on low dose ineffective or causes sig tremor and anterflexion on higher doses. Will stop and review alternatives such as prolixin or risperidone  3/19 Patient with less sedation , still sig trremor. Delusional irritable, in altercation with roommate sees her room as belonging solely to her. Cont off haldol consider second antipsychotic has been on olanzapine, Prolixin, risperidone, she believes has not been on Abilify will consider this as trial with option for SHAFER  3/20 Psychotic irritable disorganized may go back to trial of olanzapine to allow for monotherapy and less EPS. Spoke with cally will lower Valtrex as dose is above recommended for maintenance  3/21 Louder more intrusive. Will titrate olanzapine ands use prns target 20-30mg, may need second antipsychotic given treatment resistance and clozapine not feasible  3/22 Patient sl calmer with po and prn olanzapine still lous intrusive irritable, needing prns. will increase to 5mg tid. Plan when calmer attempt to simplify regimen to bid to improve compliance.  3/23: no PRN overnight/this am. Noted walking in dayroom, calmer, no aggression now.  3/24: s/p fall, no injury, noted walking in dining area, remains hostile, easily agitated and uncooperative.   3/25 Patient loud irritable  but somewhat less disorganized. Seen by PT not assessed as sig Parkinsonized. will cont meds, will try to check VPA level in AM. Feel due to reduced disorganization patient no longer requires  will place on routine checks , will check labs including VPa level in AM  3/26 Modestly better on olanzpine. Cont trial consider increasing to 10mg bid.  Reorder labs when patient more cooperative  3/27 Modest improvement, cont olanzapine but will now move to bid dosing also Depakote and Sinemet will be made bid to decrease number of times per day she needs to be approached for medication  3/28 Modest gains from olanzapine with less EPS than when on haloperidol. COnt treatment will increase to 10mg bid try to obtain VPA level in AM

## 2024-03-29 PROCEDURE — 99232 SBSQ HOSP IP/OBS MODERATE 35: CPT

## 2024-03-29 RX ORDER — OLANZAPINE 15 MG/1
10 TABLET, FILM COATED ORAL ONCE
Refills: 0 | Status: DISCONTINUED | OUTPATIENT
Start: 2024-03-29 | End: 2024-04-01

## 2024-03-29 RX ORDER — OLANZAPINE 15 MG/1
5 TABLET, FILM COATED ORAL ONCE
Refills: 0 | Status: COMPLETED | OUTPATIENT
Start: 2024-03-29 | End: 2024-03-30

## 2024-03-29 RX ORDER — OLANZAPINE 15 MG/1
5 TABLET, FILM COATED ORAL ONCE
Refills: 0 | Status: DISCONTINUED | OUTPATIENT
Start: 2024-03-29 | End: 2024-03-29

## 2024-03-29 RX ORDER — OLANZAPINE 15 MG/1
10 TABLET, FILM COATED ORAL ONCE
Refills: 0 | Status: COMPLETED | OUTPATIENT
Start: 2024-03-29 | End: 2024-03-29

## 2024-03-29 RX ADMIN — METFORMIN HYDROCHLORIDE 500 MILLIGRAM(S): 850 TABLET ORAL at 09:25

## 2024-03-29 RX ADMIN — OLANZAPINE 10 MILLIGRAM(S): 15 TABLET, FILM COATED ORAL at 09:25

## 2024-03-29 RX ADMIN — Medication 400 UNIT(S): at 09:26

## 2024-03-29 RX ADMIN — VALACYCLOVIR 1000 MILLIGRAM(S): 500 TABLET, FILM COATED ORAL at 09:26

## 2024-03-29 RX ADMIN — CARBIDOPA AND LEVODOPA 1.5 TABLET(S): 25; 100 TABLET ORAL at 09:26

## 2024-03-29 RX ADMIN — Medication 75 MICROGRAM(S): at 05:49

## 2024-03-29 RX ADMIN — DIVALPROEX SODIUM 750 MILLIGRAM(S): 500 TABLET, DELAYED RELEASE ORAL at 09:26

## 2024-03-29 RX ADMIN — LISINOPRIL 20 MILLIGRAM(S): 2.5 TABLET ORAL at 09:26

## 2024-03-29 RX ADMIN — Medication 50 MILLIGRAM(S): at 09:26

## 2024-03-29 RX ADMIN — OLANZAPINE 10 MILLIGRAM(S): 15 TABLET, FILM COATED ORAL at 20:45

## 2024-03-29 NOTE — BH INPATIENT PSYCHIATRY PROGRESS NOTE - MSE UNSTRUCTURED FT
Appearance: casual grooming, no overt deficits in hygiene;  sig tremor, loose at elbows and wrist. Walks with anteroflexed gait.  Behavior: poorly related dismissive  Speech: loud, some coprolalia  Mood: irritable  Affect: congruent, stable, somewhat intense  Thought process: impoverished  Thought content: paranoid accusatory denies SI/HI. Pentecostal delusions. She believes she is [place] Barragan likely biblical reference.Focused on diet  Perceptual disturbances: no appearance of internal preoccupation  Cognition: adequately oriented  Insight: limited  Judgement: limited

## 2024-03-29 NOTE — BH INPATIENT PSYCHIATRY PROGRESS NOTE - NSBHASSESSSUMMFT_PSY_ALL_CORE
2/5 Patient showing some improving trend will give more time, if needs more medication titration due to ongoing symptoms will increase Seroquel not haldol  2/6 Improving cont meds, offered prn suppository and simethicone  2/7 Slowly improving will cont current meds, eval need for further increase Seroquel  2/8 Partial response continue meds except will increase Seroquel as was on 250mg/d PTA  2/9 Overall gains, still regresses and becomes difficult to manage will increase Seroquel to 200mg/d in divided dosing  2/12 A little calmer since started on additional mid day, cont other meds w/o change  2/13 Patient better still disorganized, hyper Holiness but less agitated, cont current treatment  2/14 Improved globally with some symptom variability. Cont current med consider further increment Seroquel  2/15 Improved cont current regimen for now  2/16 Showing  some gains continue treatment for now at current doses   2/20 Improved, cont current rx. Scheduled urogyn for pessary replacement  2/21 Improved with variable intrusiveness. Cont meds will increase Miralax and Senna  2/22 Improving cont current rx, observe for effect of re-titration of laxatives  2/23 Lost balance mainly due to behavior however to be safe will lower haldol and Klonopin modestly and observe. Otherwise generally manageable  2/24: No PRNs needed overnight. Compliant with meds. As per staff last night pt was banging her Bible against the wall and lost her balance, staff held her. Pt remains talkative and intrusive at times but overall calmer and redirectable.   2/25: remains labile, loud, easily agitated but more redirectable.  2/26 Patient seen by medicine no evidence injury needing imaging, just Tylenol prn. Will cont current meds, if anteroflexion dosesn't improve with recent drop in haldol will consider further dose reduction  2/27 Has had slow increase in EPS despite no increase in dose and some reduction will hold haldol to reduce EPS and restart at lower dose  2/28 Patient with change in med tolerance some agitation and sedation. Will hold haldol tonight consider changing to Prolixin as may be a little better for EPS and will check labs to look for any medical issue  2/29 Labile irritable, increased tremor improved with washout of haldol. Will restart haldol but see if she can be stabilized at low dose to minimize tremor. COuld consider increasing Seroquel  3/1 Improved will see if she can stabilize on Seroquel and low dose haldol to minimize EPS will address nasal symptoms  3/2 Patient better less bent tremor less calmer. Attempt to find dose of haldol that control psychosis agitation with manageable tremor. May need to try 2mg bid as 1mg big may be subtherapeutic  3/4 Some improvement in that less agitated but EPS better will cont with low dose haldol Issues is that both side effects and decompenstion have had long lags related to dose changes so unclear if at haldol 1mg bid she is better EPS wise but will gradually decompensate. Cont to observe consider increase to 1mg tid. Patient's 2PC expiring she remains delusional disorganized not yet able to managed in SNF will apply for further retention  3/5 Showing some improvement with less agitation despite delusions but also less EPS with lowered haldol. Cont meds will reduce klonopin from 0.25mg tid to bid to reduce polypharm cont other meds. If calmer less disorganized will ask PT if she can walker trial  3/6: behavior in better control, delusional, suspected AH. taking meds.   3/7: can become irritable, redirectable. c/w current meds, ambulating hunched over.   3/8: went to interventional Urology on Fri, UA ordered, ultrasound of bladder scheduled for Monday at 10 AM,  and ob gyn for pessary ordered. If blood in urine—send back to urology office for cysto (please see the full consult in chart)  3/9: Somewhat sedated this AM, calm, not agitated, will follow and adjust meds as necessary (clonazepam)   3/10: More alert today, suspicious of examiner, UA negative  3/11: Mistrustfulness remains, leading to irritability with staff; no prns given   3/12: Labile and irritability noted, poor insight and requires continued inpatient psychiatric hospitalization for safety and stabilization.  3/13: labile, irritable on approach, redirectable. c/w current meds. Delusions noted on exam.  3/14: Labile and irritability noted, poor insight and requires continued inpatient psychiatric hospitalization for safety and stabilization.  3/15: labile, irritable on approach, redirectable. c/w current meds.  3/18 doing poor on regimen where haldol on low dose ineffective or causes sig tremor and anterflexion on higher doses. Will stop and review alternatives such as prolixin or risperidone  3/19 Patient with less sedation , still sig trremor. Delusional irritable, in altercation with roommate sees her room as belonging solely to her. Cont off haldol consider second antipsychotic has been on olanzapine, Prolixin, risperidone, she believes has not been on Abilify will consider this as trial with option for SHAFER  3/20 Psychotic irritable disorganized may go back to trial of olanzapine to allow for monotherapy and less EPS. Spoke with cally will lower Valtrex as dose is above recommended for maintenance  3/21 Louder more intrusive. Will titrate olanzapine ands use prns target 20-30mg, may need second antipsychotic given treatment resistance and clozapine not feasible  3/22 Patient sl calmer with po and prn olanzapine still lous intrusive irritable, needing prns. will increase to 5mg tid. Plan when calmer attempt to simplify regimen to bid to improve compliance.  3/23: no PRN overnight/this am. Noted walking in dayroom, calmer, no aggression now.  3/24: s/p fall, no injury, noted walking in dining area, remains hostile, easily agitated and uncooperative.   3/25 Patient loud irritable  but somewhat less disorganized. Seen by PT not assessed as sig Parkinsonized. will cont meds, will try to check VPA level in AM. Feel due to reduced disorganization patient no longer requires  will place on routine checks , will check labs including VPa level in AM  3/26 Modestly better on olanzpine. Cont trial consider increasing to 10mg bid.  Reorder labs when patient more cooperative  3/27 Modest improvement, cont olanzapine but will now move to bid dosing also Depakote and Sinemet will be made bid to decrease number of times per day she needs to be approached for medication  3/28 Modest gains from olanzapine with less EPS than when on haloperidol. COnt treatment will increase to 10mg bid try to obtain VPA level in AM  3/29 Cont olanzapine trial. Considering other options patient refuses li and likely would be hard to follow with labs. Patient feels Abilify helped in past will consider rechallenge with cross taper

## 2024-03-29 NOTE — BH SAFETY PLAN - WARNING SIGN 1
Patient remains too symptomatic to engage in safety planing.  Patient initially presented with symptoms of agitation, disorganization, paranoia, impulsive behavior and restlessness. Patient was medically discharged and unable to complete safety planning.

## 2024-03-29 NOTE — BH INPATIENT PSYCHIATRY PROGRESS NOTE - NSBHMETABOLIC_PSY_ALL_CORE_FT
BMI: BMI (kg/m2): 30.2 (01-15-24 @ 12:52)  HbA1c: A1C with Estimated Average Glucose Result: 7.6 % (01-12-24 @ 12:40)    Glucose: POCT Blood Glucose.: 139 mg/dL (03-27-24 @ 08:17)    BP: 155/79 (03-27-24 @ 10:18) (155/79 - 155/79)Vital Signs Last 24 Hrs  T(C): --  T(F): --  HR: --  BP: --  BP(mean): --  RR: --  SpO2: --    Orthostatic VS  03-28-24 @ 05:56  Lying BP: --/-- HR: --  Sitting BP: 162/92 HR: 113  Standing BP: 165/90 HR: 94  Site: --  Mode: --    Lipid Panel:

## 2024-03-29 NOTE — BH INPATIENT PSYCHIATRY PROGRESS NOTE - NSBHCHARTREVIEWVS_PSY_A_CORE FT
Vital Signs Last 24 Hrs  T(C): --  T(F): --  HR: --  BP: --  BP(mean): --  RR: --  SpO2: --    Orthostatic VS  03-28-24 @ 05:56  Lying BP: --/-- HR: --  Sitting BP: 162/92 HR: 113  Standing BP: 165/90 HR: 94  Site: --  Mode: --

## 2024-03-29 NOTE — BH INPATIENT PSYCHIATRY PROGRESS NOTE - CURRENT MEDICATION
MEDICATIONS  (STANDING):  ammonium lactate 12% Lotion 1 Application(s) Topical two times a day  carbidopa/levodopa  25/100 1.5 Tablet(s) Oral <User Schedule>  cholecalciferol 400 Unit(s) Oral daily  dextrose 5%. 1000 milliLiter(s) (100 mL/Hr) IV Continuous <Continuous>  dextrose 5%. 1000 milliLiter(s) (50 mL/Hr) IV Continuous <Continuous>  dextrose 50% Injectable 25 Gram(s) IV Push once  dextrose 50% Injectable 12.5 Gram(s) IV Push once  dextrose 50% Injectable 25 Gram(s) IV Push once  divalproex Sprinkle 750 milliGRAM(s) Oral two times a day  glucagon  Injectable 1 milliGRAM(s) IntraMuscular once  hemorrhoidal Ointment 1 Application(s) Rectal daily  hydrocortisone 2.5% Ointment 1 Application(s) Topical two times a day  insulin lispro (ADMELOG) corrective regimen sliding scale   SubCutaneous three times a day before meals  levothyroxine 75 MICROGram(s) Oral daily  lisinopril 20 milliGRAM(s) Oral daily  metFORMIN 500 milliGRAM(s) Oral two times a day  metoprolol succinate ER 50 milliGRAM(s) Oral daily  multivitamin 1 Tablet(s) Oral at bedtime  OLANZapine Disintegrating Tablet 10 milliGRAM(s) Oral daily  OLANZapine Disintegrating Tablet 10 milliGRAM(s) Oral at bedtime  polyethylene glycol 3350 17 Gram(s) Oral daily  senna 2 Tablet(s) Oral at bedtime  valACYclovir 1000 milliGRAM(s) Oral daily    MEDICATIONS  (PRN):  acetaminophen     Tablet .. 650 milliGRAM(s) Oral every 6 hours PRN Mild Pain (1 - 3), Moderate Pain (4 - 6)  albuterol    90 MICROgram(s) HFA Inhaler 2 Puff(s) Inhalation every 6 hours PRN Shortness of Breath and/or Wheezing  aluminum hydroxide/magnesium hydroxide/simethicone Suspension 30 milliLiter(s) Oral every 6 hours PRN Dyspepsia  bisacodyl Suppository 10 milliGRAM(s) Rectal daily PRN constipation  dextrose Oral Gel 15 Gram(s) Oral once PRN Blood Glucose LESS THAN 70 milliGRAM(s)/deciliter  OLANZapine Disintegrating Tablet 5 milliGRAM(s) Oral every 8 hours PRN agitation  OLANZapine Injectable 5 milliGRAM(s) IntraMuscular once PRN severe agitation  OLANZapine Injectable 5 milliGRAM(s) IntraMuscular once PRN severe agitation  simethicone 80 milliGRAM(s) Chew every 8 hours PRN gas  sodium chloride 0.65% Nasal 2 Spray(s) Both Nostrils every 6 hours PRN congestion

## 2024-03-29 NOTE — BH INPATIENT PSYCHIATRY PROGRESS NOTE - NSBHFUPINTERVALHXFT_PSY_A_CORE
Pt is seen and evaluated for follow up for schizoaffective disorder. Chart reviewed and discussed with nursing staff. Pt is compliant with medications. Refused BP but when she allows BP' sare WNL. Patient intrusive irritable, not aggressive, needing much redirection refused labs

## 2024-03-29 NOTE — BH INPATIENT PSYCHIATRY PROGRESS NOTE - PRN MEDS
MEDICATIONS  (PRN):  acetaminophen     Tablet .. 650 milliGRAM(s) Oral every 6 hours PRN Mild Pain (1 - 3), Moderate Pain (4 - 6)  albuterol    90 MICROgram(s) HFA Inhaler 2 Puff(s) Inhalation every 6 hours PRN Shortness of Breath and/or Wheezing  aluminum hydroxide/magnesium hydroxide/simethicone Suspension 30 milliLiter(s) Oral every 6 hours PRN Dyspepsia  bisacodyl Suppository 10 milliGRAM(s) Rectal daily PRN constipation  dextrose Oral Gel 15 Gram(s) Oral once PRN Blood Glucose LESS THAN 70 milliGRAM(s)/deciliter  OLANZapine Disintegrating Tablet 5 milliGRAM(s) Oral every 8 hours PRN agitation  OLANZapine Injectable 5 milliGRAM(s) IntraMuscular once PRN severe agitation  OLANZapine Injectable 5 milliGRAM(s) IntraMuscular once PRN severe agitation  simethicone 80 milliGRAM(s) Chew every 8 hours PRN gas  sodium chloride 0.65% Nasal 2 Spray(s) Both Nostrils every 6 hours PRN congestion

## 2024-03-30 LAB — GLUCOSE BLDC GLUCOMTR-MCNC: 139 MG/DL — HIGH (ref 70–99)

## 2024-03-30 PROCEDURE — 99232 SBSQ HOSP IP/OBS MODERATE 35: CPT

## 2024-03-30 RX ORDER — OLANZAPINE 15 MG/1
5 TABLET, FILM COATED ORAL ONCE
Refills: 0 | Status: DISCONTINUED | OUTPATIENT
Start: 2024-03-30 | End: 2024-03-30

## 2024-03-30 RX ORDER — FLUPHENAZINE HYDROCHLORIDE 1 MG/1
1 TABLET, FILM COATED ORAL EVERY 6 HOURS
Refills: 0 | Status: DISCONTINUED | OUTPATIENT
Start: 2024-03-30 | End: 2024-04-10

## 2024-03-30 RX ADMIN — VALACYCLOVIR 1000 MILLIGRAM(S): 500 TABLET, FILM COATED ORAL at 08:16

## 2024-03-30 RX ADMIN — OLANZAPINE 10 MILLIGRAM(S): 15 TABLET, FILM COATED ORAL at 08:15

## 2024-03-30 RX ADMIN — Medication 1 MILLIGRAM(S): at 21:01

## 2024-03-30 RX ADMIN — DIVALPROEX SODIUM 750 MILLIGRAM(S): 500 TABLET, DELAYED RELEASE ORAL at 08:16

## 2024-03-30 RX ADMIN — METFORMIN HYDROCHLORIDE 500 MILLIGRAM(S): 850 TABLET ORAL at 08:16

## 2024-03-30 RX ADMIN — Medication 400 UNIT(S): at 08:17

## 2024-03-30 RX ADMIN — LISINOPRIL 20 MILLIGRAM(S): 2.5 TABLET ORAL at 08:17

## 2024-03-30 RX ADMIN — Medication 50 MILLIGRAM(S): at 08:17

## 2024-03-30 RX ADMIN — OLANZAPINE 5 MILLIGRAM(S): 15 TABLET, FILM COATED ORAL at 05:06

## 2024-03-30 RX ADMIN — POLYETHYLENE GLYCOL 3350 17 GRAM(S): 17 POWDER, FOR SOLUTION ORAL at 08:15

## 2024-03-30 RX ADMIN — CARBIDOPA AND LEVODOPA 1.5 TABLET(S): 25; 100 TABLET ORAL at 08:17

## 2024-03-30 NOTE — BH INPATIENT PSYCHIATRY PROGRESS NOTE - NSBHFUPINTERVALHXFT_PSY_A_CORE
Pt is seen and evaluated for follow up for schizoaffective disorder. Chart reviewed and discussed with nursing staff. Pt is compliant with medications. Refused BP but when she allows BP' are WNL. Patient intrusive irritable, agitated got olanzapine 10mg IM with poor effect

## 2024-03-30 NOTE — BH INPATIENT PSYCHIATRY PROGRESS NOTE - CURRENT MEDICATION
MEDICATIONS  (STANDING):  ammonium lactate 12% Lotion 1 Application(s) Topical two times a day  carbidopa/levodopa  25/100 1.5 Tablet(s) Oral <User Schedule>  cholecalciferol 400 Unit(s) Oral daily  divalproex Sprinkle 750 milliGRAM(s) Oral two times a day  glucagon  Injectable 1 milliGRAM(s) IntraMuscular once  hemorrhoidal Ointment 1 Application(s) Rectal daily  hydrocortisone 2.5% Ointment 1 Application(s) Topical two times a day  insulin lispro (ADMELOG) corrective regimen sliding scale   SubCutaneous three times a day before meals  levothyroxine 75 MICROGram(s) Oral daily  lisinopril 20 milliGRAM(s) Oral daily  metFORMIN 500 milliGRAM(s) Oral two times a day  metoprolol succinate ER 50 milliGRAM(s) Oral daily  multivitamin 1 Tablet(s) Oral at bedtime  OLANZapine Disintegrating Tablet 10 milliGRAM(s) Oral at bedtime  OLANZapine Disintegrating Tablet 10 milliGRAM(s) Oral daily  polyethylene glycol 3350 17 Gram(s) Oral daily  senna 2 Tablet(s) Oral at bedtime  valACYclovir 1000 milliGRAM(s) Oral daily    MEDICATIONS  (PRN):  acetaminophen     Tablet .. 650 milliGRAM(s) Oral every 6 hours PRN Mild Pain (1 - 3), Moderate Pain (4 - 6)  albuterol    90 MICROgram(s) HFA Inhaler 2 Puff(s) Inhalation every 6 hours PRN Shortness of Breath and/or Wheezing  aluminum hydroxide/magnesium hydroxide/simethicone Suspension 30 milliLiter(s) Oral every 6 hours PRN Dyspepsia  bisacodyl Suppository 10 milliGRAM(s) Rectal daily PRN constipation  dextrose Oral Gel 15 Gram(s) Oral once PRN Blood Glucose LESS THAN 70 milliGRAM(s)/deciliter  fluPHENAZine 1 milliGRAM(s) Oral every 6 hours PRN agitation  LORazepam     Tablet 1 milliGRAM(s) Oral every 6 hours PRN agitation  LORazepam   Injectable 1 milliGRAM(s) IntraMuscular once PRN severe agitation  OLANZapine Injectable 10 milliGRAM(s) IntraMuscular once PRN agitation  simethicone 80 milliGRAM(s) Chew every 8 hours PRN gas  sodium chloride 0.65% Nasal 2 Spray(s) Both Nostrils every 6 hours PRN congestion

## 2024-03-30 NOTE — BH INPATIENT PSYCHIATRY PROGRESS NOTE - NSBHMETABOLIC_PSY_ALL_CORE_FT
BMI: BMI (kg/m2): 30.2 (01-15-24 @ 12:52)  HbA1c: A1C with Estimated Average Glucose Result: 7.6 % (01-12-24 @ 12:40)    Glucose: POCT Blood Glucose.: 139 mg/dL (03-27-24 @ 08:17)    BP: --Vital Signs Last 24 Hrs  T(C): --  T(F): --  HR: --  BP: --  BP(mean): --  RR: --  SpO2: --      Lipid Panel:

## 2024-03-30 NOTE — BH INPATIENT PSYCHIATRY PROGRESS NOTE - PRN MEDS
MEDICATIONS  (PRN):  acetaminophen     Tablet .. 650 milliGRAM(s) Oral every 6 hours PRN Mild Pain (1 - 3), Moderate Pain (4 - 6)  albuterol    90 MICROgram(s) HFA Inhaler 2 Puff(s) Inhalation every 6 hours PRN Shortness of Breath and/or Wheezing  aluminum hydroxide/magnesium hydroxide/simethicone Suspension 30 milliLiter(s) Oral every 6 hours PRN Dyspepsia  bisacodyl Suppository 10 milliGRAM(s) Rectal daily PRN constipation  dextrose Oral Gel 15 Gram(s) Oral once PRN Blood Glucose LESS THAN 70 milliGRAM(s)/deciliter  fluPHENAZine 1 milliGRAM(s) Oral every 6 hours PRN agitation  LORazepam     Tablet 1 milliGRAM(s) Oral every 6 hours PRN agitation  LORazepam   Injectable 1 milliGRAM(s) IntraMuscular once PRN severe agitation  OLANZapine Injectable 10 milliGRAM(s) IntraMuscular once PRN agitation  simethicone 80 milliGRAM(s) Chew every 8 hours PRN gas  sodium chloride 0.65% Nasal 2 Spray(s) Both Nostrils every 6 hours PRN congestion

## 2024-03-30 NOTE — BH INPATIENT PSYCHIATRY PROGRESS NOTE - MSE UNSTRUCTURED FT
Appearance: casual grooming, no overt deficits in hygiene;  sig tremor, loose at elbows and wrist. Walks with anteroflexed gait though no shuffling or cogwheeling  Behavior: poorly related dismissive  Speech: loud, some coprolalia  Mood: irritable  Affect: congruent, stable, somewhat intense  Thought process: impoverished  Thought content: paranoid accusatory denies SI/HI. Amish delusions. She believes she is [place] Barragan likely biblical reference.Focused on diet  Perceptual disturbances: no appearance of internal preoccupation  Cognition: adequately oriented  Insight: limited  Judgement: limited

## 2024-03-30 NOTE — BH INPATIENT PSYCHIATRY PROGRESS NOTE - NSBHASSESSSUMMFT_PSY_ALL_CORE
2/5 Patient showing some improving trend will give more time, if needs more medication titration due to ongoing symptoms will increase Seroquel not haldol  2/6 Improving cont meds, offered prn suppository and simethicone  2/7 Slowly improving will cont current meds, eval need for further increase Seroquel  2/8 Partial response continue meds except will increase Seroquel as was on 250mg/d PTA  2/9 Overall gains, still regresses and becomes difficult to manage will increase Seroquel to 200mg/d in divided dosing  2/12 A little calmer since started on additional mid day, cont other meds w/o change  2/13 Patient better still disorganized, hyper Zoroastrian but less agitated, cont current treatment  2/14 Improved globally with some symptom variability. Cont current med consider further increment Seroquel  2/15 Improved cont current regimen for now  2/16 Showing  some gains continue treatment for now at current doses   2/20 Improved, cont current rx. Scheduled urogyn for pessary replacement  2/21 Improved with variable intrusiveness. Cont meds will increase Miralax and Senna  2/22 Improving cont current rx, observe for effect of re-titration of laxatives  2/23 Lost balance mainly due to behavior however to be safe will lower haldol and Klonopin modestly and observe. Otherwise generally manageable  2/24: No PRNs needed overnight. Compliant with meds. As per staff last night pt was banging her Bible against the wall and lost her balance, staff held her. Pt remains talkative and intrusive at times but overall calmer and redirectable.   2/25: remains labile, loud, easily agitated but more redirectable.  2/26 Patient seen by medicine no evidence injury needing imaging, just Tylenol prn. Will cont current meds, if anteroflexion dosesn't improve with recent drop in haldol will consider further dose reduction  2/27 Has had slow increase in EPS despite no increase in dose and some reduction will hold haldol to reduce EPS and restart at lower dose  2/28 Patient with change in med tolerance some agitation and sedation. Will hold haldol tonight consider changing to Prolixin as may be a little better for EPS and will check labs to look for any medical issue  2/29 Labile irritable, increased tremor improved with washout of haldol. Will restart haldol but see if she can be stabilized at low dose to minimize tremor. COuld consider increasing Seroquel  3/1 Improved will see if she can stabilize on Seroquel and low dose haldol to minimize EPS will address nasal symptoms  3/2 Patient better less bent tremor less calmer. Attempt to find dose of haldol that control psychosis agitation with manageable tremor. May need to try 2mg bid as 1mg big may be subtherapeutic  3/4 Some improvement in that less agitated but EPS better will cont with low dose haldol Issues is that both side effects and decompenstion have had long lags related to dose changes so unclear if at haldol 1mg bid she is better EPS wise but will gradually decompensate. Cont to observe consider increase to 1mg tid. Patient's 2PC expiring she remains delusional disorganized not yet able to managed in SNF will apply for further retention  3/5 Showing some improvement with less agitation despite delusions but also less EPS with lowered haldol. Cont meds will reduce klonopin from 0.25mg tid to bid to reduce polypharm cont other meds. If calmer less disorganized will ask PT if she can walker trial  3/6: behavior in better control, delusional, suspected AH. taking meds.   3/7: can become irritable, redirectable. c/w current meds, ambulating hunched over.   3/8: went to interventional Urology on Fri, UA ordered, ultrasound of bladder scheduled for Monday at 10 AM,  and ob gyn for pessary ordered. If blood in urine—send back to urology office for cysto (please see the full consult in chart)  3/9: Somewhat sedated this AM, calm, not agitated, will follow and adjust meds as necessary (clonazepam)   3/10: More alert today, suspicious of examiner, UA negative  3/11: Mistrustfulness remains, leading to irritability with staff; no prns given   3/12: Labile and irritability noted, poor insight and requires continued inpatient psychiatric hospitalization for safety and stabilization.  3/13: labile, irritable on approach, redirectable. c/w current meds. Delusions noted on exam.  3/14: Labile and irritability noted, poor insight and requires continued inpatient psychiatric hospitalization for safety and stabilization.  3/15: labile, irritable on approach, redirectable. c/w current meds.  3/18 doing poor on regimen where haldol on low dose ineffective or causes sig tremor and anterflexion on higher doses. Will stop and review alternatives such as prolixin or risperidone  3/19 Patient with less sedation , still sig trremor. Delusional irritable, in altercation with roommate sees her room as belonging solely to her. Cont off haldol consider second antipsychotic has been on olanzapine, Prolixin, risperidone, she believes has not been on Abilify will consider this as trial with option for SHAFER  3/20 Psychotic irritable disorganized may go back to trial of olanzapine to allow for monotherapy and less EPS. Spoke with cally will lower Valtrex as dose is above recommended for maintenance  3/21 Louder more intrusive. Will titrate olanzapine ands use prns target 20-30mg, may need second antipsychotic given treatment resistance and clozapine not feasible  3/22 Patient sl calmer with po and prn olanzapine still lous intrusive irritable, needing prns. will increase to 5mg tid. Plan when calmer attempt to simplify regimen to bid to improve compliance.  3/23: no PRN overnight/this am. Noted walking in dayroom, calmer, no aggression now.  3/24: s/p fall, no injury, noted walking in dining area, remains hostile, easily agitated and uncooperative.   3/25 Patient loud irritable  but somewhat less disorganized. Seen by PT not assessed as sig Parkinsonized. will cont meds, will try to check VPA level in AM. Feel due to reduced disorganization patient no longer requires  will place on routine checks , will check labs including VPa level in AM  3/26 Modestly better on olanzpine. Cont trial consider increasing to 10mg bid.  Reorder labs when patient more cooperative  3/27 Modest improvement, cont olanzapine but will now move to bid dosing also Depakote and Sinemet will be made bid to decrease number of times per day she needs to be approached for medication  3/28 Modest gains from olanzapine with less EPS than when on haloperidol. COnt treatment will increase to 10mg bid try to obtain VPA level in AM  3/29 Cont olanzapine trial. Considering other options patient refuses li and likely would be hard to follow with labs. Patient feels Abilify helped in past will consider rechallenge with cross taper  3/30 Poor reponse to Zyprexa will change prn to Ativan /fluphenazine consider then standing prolicxin at low dose as only time patient stable was when she was on haldol but then developed EPS at higher dose

## 2024-03-31 PROCEDURE — 93010 ELECTROCARDIOGRAM REPORT: CPT

## 2024-03-31 PROCEDURE — 99232 SBSQ HOSP IP/OBS MODERATE 35: CPT

## 2024-03-31 RX ORDER — OLANZAPINE 15 MG/1
10 TABLET, FILM COATED ORAL ONCE
Refills: 0 | Status: DISCONTINUED | OUTPATIENT
Start: 2024-03-31 | End: 2024-03-31

## 2024-03-31 RX ORDER — FLUPHENAZINE HYDROCHLORIDE 1 MG/1
1 TABLET, FILM COATED ORAL
Refills: 0 | Status: DISCONTINUED | OUTPATIENT
Start: 2024-03-31 | End: 2024-04-01

## 2024-03-31 RX ORDER — OLANZAPINE 15 MG/1
5 TABLET, FILM COATED ORAL ONCE
Refills: 0 | Status: COMPLETED | OUTPATIENT
Start: 2024-03-31 | End: 2024-03-31

## 2024-03-31 RX ADMIN — OLANZAPINE 10 MILLIGRAM(S): 15 TABLET, FILM COATED ORAL at 21:11

## 2024-03-31 RX ADMIN — Medication 1 TABLET(S): at 21:11

## 2024-03-31 RX ADMIN — Medication 400 UNIT(S): at 08:06

## 2024-03-31 RX ADMIN — CARBIDOPA AND LEVODOPA 1.5 TABLET(S): 25; 100 TABLET ORAL at 08:05

## 2024-03-31 RX ADMIN — FLUPHENAZINE HYDROCHLORIDE 1 MILLIGRAM(S): 1 TABLET, FILM COATED ORAL at 16:28

## 2024-03-31 RX ADMIN — Medication 50 MILLIGRAM(S): at 08:05

## 2024-03-31 RX ADMIN — SENNA PLUS 2 TABLET(S): 8.6 TABLET ORAL at 21:11

## 2024-03-31 RX ADMIN — CARBIDOPA AND LEVODOPA 1.5 TABLET(S): 25; 100 TABLET ORAL at 21:24

## 2024-03-31 RX ADMIN — METFORMIN HYDROCHLORIDE 500 MILLIGRAM(S): 850 TABLET ORAL at 21:11

## 2024-03-31 RX ADMIN — LISINOPRIL 20 MILLIGRAM(S): 2.5 TABLET ORAL at 08:05

## 2024-03-31 RX ADMIN — DIVALPROEX SODIUM 750 MILLIGRAM(S): 500 TABLET, DELAYED RELEASE ORAL at 08:05

## 2024-03-31 RX ADMIN — OLANZAPINE 5 MILLIGRAM(S): 15 TABLET, FILM COATED ORAL at 17:42

## 2024-03-31 RX ADMIN — DIVALPROEX SODIUM 750 MILLIGRAM(S): 500 TABLET, DELAYED RELEASE ORAL at 21:11

## 2024-03-31 RX ADMIN — VALACYCLOVIR 1000 MILLIGRAM(S): 500 TABLET, FILM COATED ORAL at 08:06

## 2024-03-31 RX ADMIN — METFORMIN HYDROCHLORIDE 500 MILLIGRAM(S): 850 TABLET ORAL at 08:05

## 2024-03-31 RX ADMIN — OLANZAPINE 10 MILLIGRAM(S): 15 TABLET, FILM COATED ORAL at 08:09

## 2024-03-31 RX ADMIN — Medication 75 MICROGRAM(S): at 06:02

## 2024-03-31 NOTE — BH INPATIENT PSYCHIATRY PROGRESS NOTE - CURRENT MEDICATION
MEDICATIONS  (STANDING):  ammonium lactate 12% Lotion 1 Application(s) Topical two times a day  carbidopa/levodopa  25/100 1.5 Tablet(s) Oral <User Schedule>  cholecalciferol 400 Unit(s) Oral daily  divalproex Sprinkle 750 milliGRAM(s) Oral two times a day  fluPHENAZine 1 milliGRAM(s) Oral <User Schedule>  glucagon  Injectable 1 milliGRAM(s) IntraMuscular once  hemorrhoidal Ointment 1 Application(s) Rectal daily  hydrocortisone 2.5% Ointment 1 Application(s) Topical two times a day  insulin lispro (ADMELOG) corrective regimen sliding scale   SubCutaneous three times a day before meals  levothyroxine 75 MICROGram(s) Oral daily  lisinopril 20 milliGRAM(s) Oral daily  metFORMIN 500 milliGRAM(s) Oral two times a day  metoprolol succinate ER 50 milliGRAM(s) Oral daily  multivitamin 1 Tablet(s) Oral at bedtime  OLANZapine Disintegrating Tablet 10 milliGRAM(s) Oral at bedtime  OLANZapine Disintegrating Tablet 10 milliGRAM(s) Oral daily  polyethylene glycol 3350 17 Gram(s) Oral daily  senna 2 Tablet(s) Oral at bedtime  valACYclovir 1000 milliGRAM(s) Oral daily    MEDICATIONS  (PRN):  acetaminophen     Tablet .. 650 milliGRAM(s) Oral every 6 hours PRN Mild Pain (1 - 3), Moderate Pain (4 - 6)  albuterol    90 MICROgram(s) HFA Inhaler 2 Puff(s) Inhalation every 6 hours PRN Shortness of Breath and/or Wheezing  aluminum hydroxide/magnesium hydroxide/simethicone Suspension 30 milliLiter(s) Oral every 6 hours PRN Dyspepsia  bisacodyl Suppository 10 milliGRAM(s) Rectal daily PRN constipation  dextrose Oral Gel 15 Gram(s) Oral once PRN Blood Glucose LESS THAN 70 milliGRAM(s)/deciliter  fluPHENAZine 1 milliGRAM(s) Oral every 6 hours PRN agitation  LORazepam     Tablet 1 milliGRAM(s) Oral every 6 hours PRN agitation  LORazepam   Injectable 1 milliGRAM(s) IntraMuscular once PRN severe agitation  OLANZapine Injectable 10 milliGRAM(s) IntraMuscular once PRN agitation  simethicone 80 milliGRAM(s) Chew every 8 hours PRN gas  sodium chloride 0.65% Nasal 2 Spray(s) Both Nostrils every 6 hours PRN congestion

## 2024-03-31 NOTE — BH INPATIENT PSYCHIATRY PROGRESS NOTE - MSE UNSTRUCTURED FT
Appearance: casual grooming, no overt deficits in hygiene;  sig tremor, loose at elbows and wrist. Walks with anteroflexed gait though no shuffling or cogwheeling  Behavior: poorly related dismissive  Speech: loud, some coprolalia  Mood: irritable  Affect: congruent, stable, somewhat intense  Thought process: impoverished  Thought content: paranoid accusatory denies SI/HI. Yazidism delusions. She believes she is Citizen of [place] likely biblical reference.Focused on diet  Perceptual disturbances: no appearance of internal preoccupation  Cognition: adequately oriented  Insight: limited  Judgement: limited

## 2024-03-31 NOTE — BH INPATIENT PSYCHIATRY PROGRESS NOTE - NSBHCHARTREVIEWVS_PSY_A_CORE FT
Vital Signs Last 24 Hrs  T(C): --  T(F): --  HR: --  BP: --  BP(mean): --  RR: --  SpO2: --    Orthostatic VS  03-31-24 @ 08:20  Lying BP: --/-- HR: --  Sitting BP: 137/75 HR: 86  Standing BP: 149/73 HR: 82  Site: --  Mode: --

## 2024-03-31 NOTE — BH INPATIENT PSYCHIATRY PROGRESS NOTE - NSBHFUPINTERVALHXFT_PSY_A_CORE
Pt is seen and evaluated for follow up for schizoaffective disorder. Chart reviewed and discussed with nursing staff. VSS. Agitated last night got Ativan with some reponse. EKG nl QRS Qtc 464

## 2024-03-31 NOTE — BH INPATIENT PSYCHIATRY PROGRESS NOTE - NSBHMETABOLIC_PSY_ALL_CORE_FT
BMI: BMI (kg/m2): 30.2 (01-15-24 @ 12:52)  HbA1c: A1C with Estimated Average Glucose Result: 7.6 % (01-12-24 @ 12:40)    Glucose: POCT Blood Glucose.: 139 mg/dL (03-30-24 @ 12:11)    BP: --Vital Signs Last 24 Hrs  T(C): --  T(F): --  HR: --  BP: --  BP(mean): --  RR: --  SpO2: --    Orthostatic VS  03-31-24 @ 08:20  Lying BP: --/-- HR: --  Sitting BP: 137/75 HR: 86  Standing BP: 149/73 HR: 82  Site: --  Mode: --    Lipid Panel:

## 2024-03-31 NOTE — BH INPATIENT PSYCHIATRY PROGRESS NOTE - NSBHASSESSSUMMFT_PSY_ALL_CORE
2/5 Patient showing some improving trend will give more time, if needs more medication titration due to ongoing symptoms will increase Seroquel not haldol  2/6 Improving cont meds, offered prn suppository and simethicone  2/7 Slowly improving will cont current meds, eval need for further increase Seroquel  2/8 Partial response continue meds except will increase Seroquel as was on 250mg/d PTA  2/9 Overall gains, still regresses and becomes difficult to manage will increase Seroquel to 200mg/d in divided dosing  2/12 A little calmer since started on additional mid day, cont other meds w/o change  2/13 Patient better still disorganized, hyper Caodaism but less agitated, cont current treatment  2/14 Improved globally with some symptom variability. Cont current med consider further increment Seroquel  2/15 Improved cont current regimen for now  2/16 Showing  some gains continue treatment for now at current doses   2/20 Improved, cont current rx. Scheduled urogyn for pessary replacement  2/21 Improved with variable intrusiveness. Cont meds will increase Miralax and Senna  2/22 Improving cont current rx, observe for effect of re-titration of laxatives  2/23 Lost balance mainly due to behavior however to be safe will lower haldol and Klonopin modestly and observe. Otherwise generally manageable  2/24: No PRNs needed overnight. Compliant with meds. As per staff last night pt was banging her Bible against the wall and lost her balance, staff held her. Pt remains talkative and intrusive at times but overall calmer and redirectable.   2/25: remains labile, loud, easily agitated but more redirectable.  2/26 Patient seen by medicine no evidence injury needing imaging, just Tylenol prn. Will cont current meds, if anteroflexion dosesn't improve with recent drop in haldol will consider further dose reduction  2/27 Has had slow increase in EPS despite no increase in dose and some reduction will hold haldol to reduce EPS and restart at lower dose  2/28 Patient with change in med tolerance some agitation and sedation. Will hold haldol tonight consider changing to Prolixin as may be a little better for EPS and will check labs to look for any medical issue  2/29 Labile irritable, increased tremor improved with washout of haldol. Will restart haldol but see if she can be stabilized at low dose to minimize tremor. COuld consider increasing Seroquel  3/1 Improved will see if she can stabilize on Seroquel and low dose haldol to minimize EPS will address nasal symptoms  3/2 Patient better less bent tremor less calmer. Attempt to find dose of haldol that control psychosis agitation with manageable tremor. May need to try 2mg bid as 1mg big may be subtherapeutic  3/4 Some improvement in that less agitated but EPS better will cont with low dose haldol Issues is that both side effects and decompenstion have had long lags related to dose changes so unclear if at haldol 1mg bid she is better EPS wise but will gradually decompensate. Cont to observe consider increase to 1mg tid. Patient's 2PC expiring she remains delusional disorganized not yet able to managed in SNF will apply for further retention  3/5 Showing some improvement with less agitation despite delusions but also less EPS with lowered haldol. Cont meds will reduce klonopin from 0.25mg tid to bid to reduce polypharm cont other meds. If calmer less disorganized will ask PT if she can walker trial  3/6: behavior in better control, delusional, suspected AH. taking meds.   3/7: can become irritable, redirectable. c/w current meds, ambulating hunched over.   3/8: went to interventional Urology on Fri, UA ordered, ultrasound of bladder scheduled for Monday at 10 AM,  and ob gyn for pessary ordered. If blood in urine—send back to urology office for cysto (please see the full consult in chart)  3/9: Somewhat sedated this AM, calm, not agitated, will follow and adjust meds as necessary (clonazepam)   3/10: More alert today, suspicious of examiner, UA negative  3/11: Mistrustfulness remains, leading to irritability with staff; no prns given   3/12: Labile and irritability noted, poor insight and requires continued inpatient psychiatric hospitalization for safety and stabilization.  3/13: labile, irritable on approach, redirectable. c/w current meds. Delusions noted on exam.  3/14: Labile and irritability noted, poor insight and requires continued inpatient psychiatric hospitalization for safety and stabilization.  3/15: labile, irritable on approach, redirectable. c/w current meds.  3/18 doing poor on regimen where haldol on low dose ineffective or causes sig tremor and anterflexion on higher doses. Will stop and review alternatives such as prolixin or risperidone  3/19 Patient with less sedation , still sig trremor. Delusional irritable, in altercation with roommate sees her room as belonging solely to her. Cont off haldol consider second antipsychotic has been on olanzapine, Prolixin, risperidone, she believes has not been on Abilify will consider this as trial with option for SHAFER  3/20 Psychotic irritable disorganized may go back to trial of olanzapine to allow for monotherapy and less EPS. Spoke with cally will lower Valtrex as dose is above recommended for maintenance  3/21 Louder more intrusive. Will titrate olanzapine ands use prns target 20-30mg, may need second antipsychotic given treatment resistance and clozapine not feasible  3/22 Patient sl calmer with po and prn olanzapine still lous intrusive irritable, needing prns. will increase to 5mg tid. Plan when calmer attempt to simplify regimen to bid to improve compliance.  3/23: no PRN overnight/this am. Noted walking in dayroom, calmer, no aggression now.  3/24: s/p fall, no injury, noted walking in dining area, remains hostile, easily agitated and uncooperative.   3/25 Patient loud irritable  but somewhat less disorganized. Seen by PT not assessed as sig Parkinsonized. will cont meds, will try to check VPA level in AM. Feel due to reduced disorganization patient no longer requires  will place on routine checks , will check labs including VPa level in AM  3/26 Modestly better on olanzpine. Cont trial consider increasing to 10mg bid.  Reorder labs when patient more cooperative  3/27 Modest improvement, cont olanzapine but will now move to bid dosing also Depakote and Sinemet will be made bid to decrease number of times per day she needs to be approached for medication  3/28 Modest gains from olanzapine with less EPS than when on haloperidol. COnt treatment will increase to 10mg bid try to obtain VPA level in AM  3/29 Cont olanzapine trial. Considering other options patient refuses li and likely would be hard to follow with labs. Patient feels Abilify helped in past will consider rechallenge with cross taper  3/30 Poor reponse to Zyprexa will change prn to Ativan /fluphenazine consider then standing prolicxin at low dose as only time patient stable was when she was on haldol but then developed EPS at higher dose  3/31 Remains highly agitated. Only time patient better was combo atypical and conventional will add low dose fluphenazine to olanzapine

## 2024-04-01 LAB
GLUCOSE BLDC GLUCOMTR-MCNC: 148 MG/DL — HIGH (ref 70–99)
GLUCOSE BLDC GLUCOMTR-MCNC: 174 MG/DL — HIGH (ref 70–99)
GLUCOSE BLDC GLUCOMTR-MCNC: 197 MG/DL — HIGH (ref 70–99)

## 2024-04-01 PROCEDURE — 99232 SBSQ HOSP IP/OBS MODERATE 35: CPT

## 2024-04-01 RX ORDER — FLUPHENAZINE HYDROCHLORIDE 1 MG/1
1 TABLET, FILM COATED ORAL
Refills: 0 | Status: DISCONTINUED | OUTPATIENT
Start: 2024-04-01 | End: 2024-04-02

## 2024-04-01 RX ORDER — FLUPHENAZINE HYDROCHLORIDE 1 MG/1
1 TABLET, FILM COATED ORAL ONCE
Refills: 0 | Status: DISCONTINUED | OUTPATIENT
Start: 2024-04-01 | End: 2024-04-10

## 2024-04-01 RX ORDER — FLUPHENAZINE HYDROCHLORIDE 1 MG/1
1 TABLET, FILM COATED ORAL ONCE
Refills: 0 | Status: COMPLETED | OUTPATIENT
Start: 2024-04-01 | End: 2024-04-01

## 2024-04-01 RX ORDER — BENZTROPINE MESYLATE 1 MG
1 TABLET ORAL
Refills: 0 | Status: DISCONTINUED | OUTPATIENT
Start: 2024-04-01 | End: 2024-05-17

## 2024-04-01 RX ORDER — OLANZAPINE 15 MG/1
10 TABLET, FILM COATED ORAL AT BEDTIME
Refills: 0 | Status: DISCONTINUED | OUTPATIENT
Start: 2024-04-01 | End: 2024-04-01

## 2024-04-01 RX ADMIN — Medication 1 MILLIGRAM(S): at 01:54

## 2024-04-01 RX ADMIN — CARBIDOPA AND LEVODOPA 1.5 TABLET(S): 25; 100 TABLET ORAL at 20:16

## 2024-04-01 RX ADMIN — Medication 1 TABLET(S): at 20:17

## 2024-04-01 RX ADMIN — CARBIDOPA AND LEVODOPA 1.5 TABLET(S): 25; 100 TABLET ORAL at 09:14

## 2024-04-01 RX ADMIN — FLUPHENAZINE HYDROCHLORIDE 1 MILLIGRAM(S): 1 TABLET, FILM COATED ORAL at 20:17

## 2024-04-01 RX ADMIN — OLANZAPINE 10 MILLIGRAM(S): 15 TABLET, FILM COATED ORAL at 09:15

## 2024-04-01 RX ADMIN — METFORMIN HYDROCHLORIDE 500 MILLIGRAM(S): 850 TABLET ORAL at 09:14

## 2024-04-01 RX ADMIN — POLYETHYLENE GLYCOL 3350 17 GRAM(S): 17 POWDER, FOR SOLUTION ORAL at 09:16

## 2024-04-01 RX ADMIN — SENNA PLUS 2 TABLET(S): 8.6 TABLET ORAL at 20:17

## 2024-04-01 RX ADMIN — FLUPHENAZINE HYDROCHLORIDE 1 MILLIGRAM(S): 1 TABLET, FILM COATED ORAL at 01:54

## 2024-04-01 RX ADMIN — Medication 1 MILLIGRAM(S): at 20:17

## 2024-04-01 RX ADMIN — METFORMIN HYDROCHLORIDE 500 MILLIGRAM(S): 850 TABLET ORAL at 21:07

## 2024-04-01 RX ADMIN — VALACYCLOVIR 1000 MILLIGRAM(S): 500 TABLET, FILM COATED ORAL at 09:14

## 2024-04-01 RX ADMIN — Medication 50 MILLIGRAM(S): at 09:14

## 2024-04-01 RX ADMIN — LISINOPRIL 20 MILLIGRAM(S): 2.5 TABLET ORAL at 09:15

## 2024-04-01 RX ADMIN — FLUPHENAZINE HYDROCHLORIDE 1 MILLIGRAM(S): 1 TABLET, FILM COATED ORAL at 09:15

## 2024-04-01 RX ADMIN — DIVALPROEX SODIUM 750 MILLIGRAM(S): 500 TABLET, DELAYED RELEASE ORAL at 09:13

## 2024-04-01 RX ADMIN — Medication 400 UNIT(S): at 09:14

## 2024-04-01 RX ADMIN — Medication 1 MILLIGRAM(S): at 20:16

## 2024-04-01 RX ADMIN — Medication 75 MICROGRAM(S): at 05:49

## 2024-04-01 RX ADMIN — DIVALPROEX SODIUM 750 MILLIGRAM(S): 500 TABLET, DELAYED RELEASE ORAL at 20:17

## 2024-04-01 NOTE — BH INPATIENT PSYCHIATRY PROGRESS NOTE - NSBHMETABOLIC_PSY_ALL_CORE_FT
BMI: BMI (kg/m2): 30.2 (01-15-24 @ 12:52)  HbA1c: A1C with Estimated Average Glucose Result: 7.6 % (01-12-24 @ 12:40)    Glucose: POCT Blood Glucose.: 174 mg/dL (04-01-24 @ 11:56)    BP: 147/95 (04-01-24 @ 08:14) (147/95 - 147/95)Vital Signs Last 24 Hrs  T(C): --  T(F): --  HR: 91 (04-01-24 @ 08:14) (91 - 91)  BP: 147/95 (04-01-24 @ 08:14) (147/95 - 147/95)  BP(mean): --  RR: --  SpO2: --    Orthostatic VS  03-31-24 @ 08:20  Lying BP: --/-- HR: --  Sitting BP: 137/75 HR: 86  Standing BP: 149/73 HR: 82  Site: --  Mode: --    Lipid Panel:

## 2024-04-01 NOTE — BH INPATIENT PSYCHIATRY PROGRESS NOTE - MSE UNSTRUCTURED FT
Appearance: casual grooming, no overt deficits in hygiene;  sig tremor, loose at elbows and wrist. Walks with anteroflexed gait though no shuffling or cogwheeling  Behavior: poorly related dismissive  Speech: loud, some coprolalia  Mood: irritable  Affect: congruent, stable, somewhat intense  Thought process: impoverished  Thought content: paranoid accusatory denies SI/HI. Mu-ism delusions. She believes she is [place] likely biblical reference.Focused on diet  Perceptual disturbances: no appearance of internal preoccupation  Cognition: adequately oriented  Insight: limited  Judgement: limited

## 2024-04-01 NOTE — BH INPATIENT PSYCHIATRY PROGRESS NOTE - NSBHCHARTREVIEWVS_PSY_A_CORE FT
Vital Signs Last 24 Hrs  T(C): --  T(F): --  HR: 91 (04-01-24 @ 08:14) (91 - 91)  BP: 147/95 (04-01-24 @ 08:14) (147/95 - 147/95)  BP(mean): --  RR: --  SpO2: --    Orthostatic VS  03-31-24 @ 08:20  Lying BP: --/-- HR: --  Sitting BP: 137/75 HR: 86  Standing BP: 149/73 HR: 82  Site: --  Mode: --

## 2024-04-01 NOTE — BH INPATIENT PSYCHIATRY PROGRESS NOTE - CURRENT MEDICATION
MEDICATIONS  (STANDING):  ammonium lactate 12% Lotion 1 Application(s) Topical two times a day  benztropine 1 milliGRAM(s) Oral two times a day  carbidopa/levodopa  25/100 1.5 Tablet(s) Oral <User Schedule>  cholecalciferol 400 Unit(s) Oral daily  divalproex Sprinkle 750 milliGRAM(s) Oral two times a day  fluPHENAZine 1 milliGRAM(s) Oral two times a day  glucagon  Injectable 1 milliGRAM(s) IntraMuscular once  hemorrhoidal Ointment 1 Application(s) Rectal daily  hydrocortisone 2.5% Ointment 1 Application(s) Topical two times a day  insulin lispro (ADMELOG) corrective regimen sliding scale   SubCutaneous three times a day before meals  levothyroxine 75 MICROGram(s) Oral daily  lisinopril 20 milliGRAM(s) Oral daily  metFORMIN 500 milliGRAM(s) Oral two times a day  metoprolol succinate ER 50 milliGRAM(s) Oral daily  multivitamin 1 Tablet(s) Oral at bedtime  polyethylene glycol 3350 17 Gram(s) Oral daily  senna 2 Tablet(s) Oral at bedtime  valACYclovir 1000 milliGRAM(s) Oral daily    MEDICATIONS  (PRN):  acetaminophen     Tablet .. 650 milliGRAM(s) Oral every 6 hours PRN Mild Pain (1 - 3), Moderate Pain (4 - 6)  albuterol    90 MICROgram(s) HFA Inhaler 2 Puff(s) Inhalation every 6 hours PRN Shortness of Breath and/or Wheezing  aluminum hydroxide/magnesium hydroxide/simethicone Suspension 30 milliLiter(s) Oral every 6 hours PRN Dyspepsia  bisacodyl Suppository 10 milliGRAM(s) Rectal daily PRN constipation  dextrose Oral Gel 15 Gram(s) Oral once PRN Blood Glucose LESS THAN 70 milliGRAM(s)/deciliter  fluPHENAZine 1 milliGRAM(s) Oral every 6 hours PRN agitation  fluPHENAZine  Injectable 1 milliGRAM(s) IntraMuscular once PRN agitation  LORazepam     Tablet 1 milliGRAM(s) Oral every 6 hours PRN agitation  LORazepam   Injectable 1 milliGRAM(s) IntraMuscular once PRN severe agitation  simethicone 80 milliGRAM(s) Chew every 8 hours PRN gas  sodium chloride 0.65% Nasal 2 Spray(s) Both Nostrils every 6 hours PRN congestion

## 2024-04-01 NOTE — BH INPATIENT PSYCHIATRY PROGRESS NOTE - NSBHASSESSSUMMFT_PSY_ALL_CORE
2/5 Patient showing some improving trend will give more time, if needs more medication titration due to ongoing symptoms will increase Seroquel not haldol  2/6 Improving cont meds, offered prn suppository and simethicone  2/7 Slowly improving will cont current meds, eval need for further increase Seroquel  2/8 Partial response continue meds except will increase Seroquel as was on 250mg/d PTA  2/9 Overall gains, still regresses and becomes difficult to manage will increase Seroquel to 200mg/d in divided dosing  2/12 A little calmer since started on additional mid day, cont other meds w/o change  2/13 Patient better still disorganized, hyper [employer] but less agitated, cont current treatment  2/14 Improved globally with some symptom variability. Cont current med consider further increment Seroquel  2/15 Improved cont current regimen for now  2/16 Showing  some gains continue treatment for now at current doses   2/20 Improved, cont current rx. Scheduled urogyn for pessary replacement  2/21 Improved with variable intrusiveness. Cont meds will increase Miralax and Senna  2/22 Improving cont current rx, observe for effect of re-titration of laxatives  2/23 Lost balance mainly due to behavior however to be safe will lower haldol and Klonopin modestly and observe. Otherwise generally manageable  2/24: No PRNs needed overnight. Compliant with meds. As per staff last night pt was banging her Bible against the wall and lost her balance, staff held her. Pt remains talkative and intrusive at times but overall calmer and redirectable.   2/25: remains labile, loud, easily agitated but more redirectable.  2/26 Patient seen by medicine no evidence injury needing imaging, just Tylenol prn. Will cont current meds, if anteroflexion dosesn't improve with recent drop in haldol will consider further dose reduction  2/27 Has had slow increase in EPS despite no increase in dose and some reduction will hold haldol to reduce EPS and restart at lower dose  2/28 Patient with change in med tolerance some agitation and sedation. Will hold haldol tonight consider changing to Prolixin as may be a little better for EPS and will check labs to look for any medical issue  2/29 Labile irritable, increased tremor improved with washout of haldol. Will restart haldol but see if she can be stabilized at low dose to minimize tremor. COuld consider increasing Seroquel  3/1 Improved will see if she can stabilize on Seroquel and low dose haldol to minimize EPS will address nasal symptoms  3/2 Patient better less bent tremor less calmer. Attempt to find dose of haldol that control psychosis agitation with manageable tremor. May need to try 2mg bid as 1mg big may be subtherapeutic  3/4 Some improvement in that less agitated but EPS better will cont with low dose haldol Issues is that both side effects and decompenstion have had long lags related to dose changes so unclear if at haldol 1mg bid she is better EPS wise but will gradually decompensate. Cont to observe consider increase to 1mg tid. Patient's 2PC expiring she remains delusional disorganized not yet able to managed in SNF will apply for further retention  3/5 Showing some improvement with less agitation despite delusions but also less EPS with lowered haldol. Cont meds will reduce klonopin from 0.25mg tid to bid to reduce polypharm cont other meds. If calmer less disorganized will ask PT if she can walker trial  3/6: behavior in better control, delusional, suspected AH. taking meds.   3/7: can become irritable, redirectable. c/w current meds, ambulating hunched over.   3/8: went to interventional Urology on Fri, UA ordered, ultrasound of bladder scheduled for Monday at 10 AM,  and ob gyn for pessary ordered. If blood in urine—send back to urology office for cysto (please see the full consult in chart)  3/9: Somewhat sedated this AM, calm, not agitated, will follow and adjust meds as necessary (clonazepam)   3/10: More alert today, suspicious of examiner, UA negative  3/11: Mistrustfulness remains, leading to irritability with staff; no prns given   3/12: Labile and irritability noted, poor insight and requires continued inpatient psychiatric hospitalization for safety and stabilization.  3/13: labile, irritable on approach, redirectable. c/w current meds. Delusions noted on exam.  3/14: Labile and irritability noted, poor insight and requires continued inpatient psychiatric hospitalization for safety and stabilization.  3/15: labile, irritable on approach, redirectable. c/w current meds.  3/18 doing poor on regimen where haldol on low dose ineffective or causes sig tremor and anterflexion on higher doses. Will stop and review alternatives such as prolixin or risperidone  3/19 Patient with less sedation , still sig trremor. Delusional irritable, in altercation with roommate sees her room as belonging solely to her. Cont off haldol consider second antipsychotic has been on olanzapine, Prolixin, risperidone, she believes has not been on Abilify will consider this as trial with option for SHAFER  3/20 Psychotic irritable disorganized may go back to trial of olanzapine to allow for monotherapy and less EPS. Spoke with cally will lower Valtrex as dose is above recommended for maintenance  3/21 Louder more intrusive. Will titrate olanzapine ands use prns target 20-30mg, may need second antipsychotic given treatment resistance and clozapine not feasible  3/22 Patient sl calmer with po and prn olanzapine still lous intrusive irritable, needing prns. will increase to 5mg tid. Plan when calmer attempt to simplify regimen to bid to improve compliance.  3/23: no PRN overnight/this am. Noted walking in dayroom, calmer, no aggression now.  3/24: s/p fall, no injury, noted walking in dining area, remains hostile, easily agitated and uncooperative.   3/25 Patient loud irritable  but somewhat less disorganized. Seen by PT not assessed as sig Parkinsonized. will cont meds, will try to check VPA level in AM. Feel due to reduced disorganization patient no longer requires  will place on routine checks , will check labs including VPa level in AM  3/26 Modestly better on olanzpine. Cont trial consider increasing to 10mg bid.  Reorder labs when patient more cooperative  3/27 Modest improvement, cont olanzapine but will now move to bid dosing also Depakote and Sinemet will be made bid to decrease number of times per day she needs to be approached for medication  3/28 Modest gains from olanzapine with less EPS than when on haloperidol. COnt treatment will increase to 10mg bid try to obtain VPA level in AM  3/29 Cont olanzapine trial. Considering other options patient refuses li and likely would be hard to follow with labs. Patient feels Abilify helped in past will consider rechallenge with cross taper  3/30 Poor reponse to Zyprexa will change prn to Ativan /fluphenazine consider then standing prolicxin at low dose as only time patient stable was when she was on haldol but then developed EPS at higher dose  3/31 Remains highly agitated. Only time patient better was combo atypical and conventional will add low dose fluphenazine to olanzapine  4/1 Agitated needing prn. Will d/c olanzapine as has been completely ineffective. Will use Prolixin as she appears to respond to conventional antipsychotics but given EPS vulnerability will add Cogentin as there is no other option to manage EPS

## 2024-04-01 NOTE — BH INPATIENT PSYCHIATRY PROGRESS NOTE - PRN MEDS
MEDICATIONS  (PRN):  acetaminophen     Tablet .. 650 milliGRAM(s) Oral every 6 hours PRN Mild Pain (1 - 3), Moderate Pain (4 - 6)  albuterol    90 MICROgram(s) HFA Inhaler 2 Puff(s) Inhalation every 6 hours PRN Shortness of Breath and/or Wheezing  aluminum hydroxide/magnesium hydroxide/simethicone Suspension 30 milliLiter(s) Oral every 6 hours PRN Dyspepsia  bisacodyl Suppository 10 milliGRAM(s) Rectal daily PRN constipation  dextrose Oral Gel 15 Gram(s) Oral once PRN Blood Glucose LESS THAN 70 milliGRAM(s)/deciliter  fluPHENAZine 1 milliGRAM(s) Oral every 6 hours PRN agitation  fluPHENAZine  Injectable 1 milliGRAM(s) IntraMuscular once PRN agitation  LORazepam     Tablet 1 milliGRAM(s) Oral every 6 hours PRN agitation  LORazepam   Injectable 1 milliGRAM(s) IntraMuscular once PRN severe agitation  simethicone 80 milliGRAM(s) Chew every 8 hours PRN gas  sodium chloride 0.65% Nasal 2 Spray(s) Both Nostrils every 6 hours PRN congestion

## 2024-04-01 NOTE — BH INPATIENT PSYCHIATRY PROGRESS NOTE - NSBHFUPINTERVALHXFT_PSY_A_CORE
Pt is seen and evaluated for follow up for schizoaffective disorder. Chart reviewed and discussed with nursing staff. VSS. Agitated last night got Ativan and Prolixin

## 2024-04-02 LAB — GLUCOSE BLDC GLUCOMTR-MCNC: 181 MG/DL — HIGH (ref 70–99)

## 2024-04-02 RX ORDER — FLUPHENAZINE HYDROCHLORIDE 1 MG/1
2 TABLET, FILM COATED ORAL
Refills: 0 | Status: DISCONTINUED | OUTPATIENT
Start: 2024-04-02 | End: 2024-04-03

## 2024-04-02 RX ADMIN — METFORMIN HYDROCHLORIDE 500 MILLIGRAM(S): 850 TABLET ORAL at 19:47

## 2024-04-02 RX ADMIN — Medication 400 UNIT(S): at 08:22

## 2024-04-02 RX ADMIN — SENNA PLUS 2 TABLET(S): 8.6 TABLET ORAL at 19:48

## 2024-04-02 RX ADMIN — DIVALPROEX SODIUM 750 MILLIGRAM(S): 500 TABLET, DELAYED RELEASE ORAL at 19:49

## 2024-04-02 RX ADMIN — FLUPHENAZINE HYDROCHLORIDE 1 MILLIGRAM(S): 1 TABLET, FILM COATED ORAL at 19:48

## 2024-04-02 RX ADMIN — FLUPHENAZINE HYDROCHLORIDE 2 MILLIGRAM(S): 1 TABLET, FILM COATED ORAL at 19:48

## 2024-04-02 RX ADMIN — FLUPHENAZINE HYDROCHLORIDE 2 MILLIGRAM(S): 1 TABLET, FILM COATED ORAL at 08:23

## 2024-04-02 RX ADMIN — Medication 1 MILLIGRAM(S): at 19:47

## 2024-04-02 RX ADMIN — LISINOPRIL 20 MILLIGRAM(S): 2.5 TABLET ORAL at 08:22

## 2024-04-02 RX ADMIN — CARBIDOPA AND LEVODOPA 1.5 TABLET(S): 25; 100 TABLET ORAL at 08:22

## 2024-04-02 RX ADMIN — Medication 1 MILLIGRAM(S): at 05:09

## 2024-04-02 RX ADMIN — Medication 75 MICROGRAM(S): at 05:09

## 2024-04-02 RX ADMIN — METFORMIN HYDROCHLORIDE 500 MILLIGRAM(S): 850 TABLET ORAL at 08:22

## 2024-04-02 RX ADMIN — Medication 1 MILLIGRAM(S): at 19:48

## 2024-04-02 RX ADMIN — VALACYCLOVIR 1000 MILLIGRAM(S): 500 TABLET, FILM COATED ORAL at 08:23

## 2024-04-02 RX ADMIN — CARBIDOPA AND LEVODOPA 1.5 TABLET(S): 25; 100 TABLET ORAL at 19:47

## 2024-04-02 RX ADMIN — DIVALPROEX SODIUM 750 MILLIGRAM(S): 500 TABLET, DELAYED RELEASE ORAL at 08:23

## 2024-04-02 RX ADMIN — FLUPHENAZINE HYDROCHLORIDE 1 MILLIGRAM(S): 1 TABLET, FILM COATED ORAL at 05:09

## 2024-04-02 RX ADMIN — Medication 1 TABLET(S): at 19:48

## 2024-04-02 RX ADMIN — Medication 1 MILLIGRAM(S): at 08:22

## 2024-04-02 NOTE — BH INPATIENT PSYCHIATRY PROGRESS NOTE - MSE UNSTRUCTURED FT
Appearance: casual grooming, no overt deficits in hygiene;  sig tremor, loose at elbows and wrist. Walks with anteroflexed gait though no shuffling or cogwheeling  Behavior: poorly related dismissive  Speech: loud, some coprolalia  Mood: irritable  Affect: congruent, stable, somewhat intense  Thought process: impoverished  Thought content: paranoid accusatory denies SI/HI. Moravian delusions. She believes she is [place] likely biblical reference.Focused on diet, believes she is getting scurvy from Vit C deficiency  Perceptual disturbances: no appearance of internal preoccupation  Cognition: adequately oriented  Insight: limited  Judgement: limited

## 2024-04-02 NOTE — BH INPATIENT PSYCHIATRY PROGRESS NOTE - NSBHMETABOLIC_PSY_ALL_CORE_FT
BMI: BMI (kg/m2): 30.2 (01-15-24 @ 12:52)  HbA1c: A1C with Estimated Average Glucose Result: 7.6 % (01-12-24 @ 12:40)    Glucose: POCT Blood Glucose.: 197 mg/dL (04-01-24 @ 16:34)    BP: 147/95 (04-01-24 @ 08:14) (147/95 - 147/95)Vital Signs Last 24 Hrs  T(C): --  T(F): --  HR: --  BP: --  BP(mean): --  RR: --  SpO2: --      Lipid Panel:

## 2024-04-02 NOTE — BH INPATIENT PSYCHIATRY PROGRESS NOTE - NSBHASSESSSUMMFT_PSY_ALL_CORE
2/5 Patient showing some improving trend will give more time, if needs more medication titration due to ongoing symptoms will increase Seroquel not haldol  2/6 Improving cont meds, offered prn suppository and simethicone  2/7 Slowly improving will cont current meds, eval need for further increase Seroquel  2/8 Partial response continue meds except will increase Seroquel as was on 250mg/d PTA  2/9 Overall gains, still regresses and becomes difficult to manage will increase Seroquel to 200mg/d in divided dosing  2/12 A little calmer since started on additional mid day, cont other meds w/o change  2/13 Patient better still disorganized, hyper [employer] but less agitated, cont current treatment  2/14 Improved globally with some symptom variability. Cont current med consider further increment Seroquel  2/15 Improved cont current regimen for now  2/16 Showing  some gains continue treatment for now at current doses   2/20 Improved, cont current rx. Scheduled urogyn for pessary replacement  2/21 Improved with variable intrusiveness. Cont meds will increase Miralax and Senna  2/22 Improving cont current rx, observe for effect of re-titration of laxatives  2/23 Lost balance mainly due to behavior however to be safe will lower haldol and Klonopin modestly and observe. Otherwise generally manageable  2/24: No PRNs needed overnight. Compliant with meds. As per staff last night pt was banging her Bible against the wall and lost her balance, staff held her. Pt remains talkative and intrusive at times but overall calmer and redirectable.   2/25: remains labile, loud, easily agitated but more redirectable.  2/26 Patient seen by medicine no evidence injury needing imaging, just Tylenol prn. Will cont current meds, if anteroflexion dosesn't improve with recent drop in haldol will consider further dose reduction  2/27 Has had slow increase in EPS despite no increase in dose and some reduction will hold haldol to reduce EPS and restart at lower dose  2/28 Patient with change in med tolerance some agitation and sedation. Will hold haldol tonight consider changing to Prolixin as may be a little better for EPS and will check labs to look for any medical issue  2/29 Labile irritable, increased tremor improved with washout of haldol. Will restart haldol but see if she can be stabilized at low dose to minimize tremor. COuld consider increasing Seroquel  3/1 Improved will see if she can stabilize on Seroquel and low dose haldol to minimize EPS will address nasal symptoms  3/2 Patient better less bent tremor less calmer. Attempt to find dose of haldol that control psychosis agitation with manageable tremor. May need to try 2mg bid as 1mg big may be subtherapeutic  3/4 Some improvement in that less agitated but EPS better will cont with low dose haldol Issues is that both side effects and decompenstion have had long lags related to dose changes so unclear if at haldol 1mg bid she is better EPS wise but will gradually decompensate. Cont to observe consider increase to 1mg tid. Patient's 2PC expiring she remains delusional disorganized not yet able to managed in SNF will apply for further retention  3/5 Showing some improvement with less agitation despite delusions but also less EPS with lowered haldol. Cont meds will reduce klonopin from 0.25mg tid to bid to reduce polypharm cont other meds. If calmer less disorganized will ask PT if she can walker trial  3/6: behavior in better control, delusional, suspected AH. taking meds.   3/7: can become irritable, redirectable. c/w current meds, ambulating hunched over.   3/8: went to interventional Urology on Fri, UA ordered, ultrasound of bladder scheduled for Monday at 10 AM,  and ob gyn for pessary ordered. If blood in urine—send back to urology office for cysto (please see the full consult in chart)  3/9: Somewhat sedated this AM, calm, not agitated, will follow and adjust meds as necessary (clonazepam)   3/10: More alert today, suspicious of examiner, UA negative  3/11: Mistrustfulness remains, leading to irritability with staff; no prns given   3/12: Labile and irritability noted, poor insight and requires continued inpatient psychiatric hospitalization for safety and stabilization.  3/13: labile, irritable on approach, redirectable. c/w current meds. Delusions noted on exam.  3/14: Labile and irritability noted, poor insight and requires continued inpatient psychiatric hospitalization for safety and stabilization.  3/15: labile, irritable on approach, redirectable. c/w current meds.  3/18 doing poor on regimen where haldol on low dose ineffective or causes sig tremor and anterflexion on higher doses. Will stop and review alternatives such as prolixin or risperidone  3/19 Patient with less sedation , still sig trremor. Delusional irritable, in altercation with roommate sees her room as belonging solely to her. Cont off haldol consider second antipsychotic has been on olanzapine, Prolixin, risperidone, she believes has not been on Abilify will consider this as trial with option for SHAFER  3/20 Psychotic irritable disorganized may go back to trial of olanzapine to allow for monotherapy and less EPS. Spoke with cally will lower Valtrex as dose is above recommended for maintenance  3/21 Louder more intrusive. Will titrate olanzapine ands use prns target 20-30mg, may need second antipsychotic given treatment resistance and clozapine not feasible  3/22 Patient sl calmer with po and prn olanzapine still lous intrusive irritable, needing prns. will increase to 5mg tid. Plan when calmer attempt to simplify regimen to bid to improve compliance.  3/23: no PRN overnight/this am. Noted walking in dayroom, calmer, no aggression now.  3/24: s/p fall, no injury, noted walking in dining area, remains hostile, easily agitated and uncooperative.   3/25 Patient loud irritable  but somewhat less disorganized. Seen by PT not assessed as sig Parkinsonized. will cont meds, will try to check VPA level in AM. Feel due to reduced disorganization patient no longer requires  will place on routine checks , will check labs including VPa level in AM  3/26 Modestly better on olanzpine. Cont trial consider increasing to 10mg bid.  Reorder labs when patient more cooperative  3/27 Modest improvement, cont olanzapine but will now move to bid dosing also Depakote and Sinemet will be made bid to decrease number of times per day she needs to be approached for medication  3/28 Modest gains from olanzapine with less EPS than when on haloperidol. COnt treatment will increase to 10mg bid try to obtain VPA level in AM  3/29 Cont olanzapine trial. Considering other options patient refuses li and likely would be hard to follow with labs. Patient feels Abilify helped in past will consider rechallenge with cross taper  3/30 Poor reponse to Zyprexa will change prn to Ativan /fluphenazine consider then standing prolicxin at low dose as only time patient stable was when she was on haldol but then developed EPS at higher dose  3/31 Remains highly agitated. Only time patient better was combo atypical and conventional will add low dose fluphenazine to olanzapine  4/1 Agitated needing prn. Will d/c olanzapine as has been completely ineffective. Will use Prolixin as she appears to respond to conventional antipsychotics but given EPS vulnerability will add Cogentin as there is no other option to manage EPS  4/2 Loud agitated psychotic no worse off olanzapine will increase fluphenazine to 2mg  bid

## 2024-04-02 NOTE — BH INPATIENT PSYCHIATRY PROGRESS NOTE - CURRENT MEDICATION
MEDICATIONS  (STANDING):  ammonium lactate 12% Lotion 1 Application(s) Topical two times a day  benztropine 1 milliGRAM(s) Oral two times a day  carbidopa/levodopa  25/100 1.5 Tablet(s) Oral <User Schedule>  cholecalciferol 400 Unit(s) Oral daily  divalproex Sprinkle 750 milliGRAM(s) Oral two times a day  fluPHENAZine 2 milliGRAM(s) Oral two times a day  glucagon  Injectable 1 milliGRAM(s) IntraMuscular once  hemorrhoidal Ointment 1 Application(s) Rectal daily  hydrocortisone 2.5% Ointment 1 Application(s) Topical two times a day  insulin lispro (ADMELOG) corrective regimen sliding scale   SubCutaneous three times a day before meals  levothyroxine 75 MICROGram(s) Oral daily  lisinopril 20 milliGRAM(s) Oral daily  metFORMIN 500 milliGRAM(s) Oral two times a day  metoprolol succinate ER 50 milliGRAM(s) Oral daily  multivitamin 1 Tablet(s) Oral at bedtime  polyethylene glycol 3350 17 Gram(s) Oral daily  senna 2 Tablet(s) Oral at bedtime  valACYclovir 1000 milliGRAM(s) Oral daily    MEDICATIONS  (PRN):  acetaminophen     Tablet .. 650 milliGRAM(s) Oral every 6 hours PRN Mild Pain (1 - 3), Moderate Pain (4 - 6)  albuterol    90 MICROgram(s) HFA Inhaler 2 Puff(s) Inhalation every 6 hours PRN Shortness of Breath and/or Wheezing  aluminum hydroxide/magnesium hydroxide/simethicone Suspension 30 milliLiter(s) Oral every 6 hours PRN Dyspepsia  bisacodyl Suppository 10 milliGRAM(s) Rectal daily PRN constipation  dextrose Oral Gel 15 Gram(s) Oral once PRN Blood Glucose LESS THAN 70 milliGRAM(s)/deciliter  fluPHENAZine 1 milliGRAM(s) Oral every 6 hours PRN agitation  fluPHENAZine  Injectable 1 milliGRAM(s) IntraMuscular once PRN agitation  LORazepam     Tablet 1 milliGRAM(s) Oral every 6 hours PRN agitation  LORazepam   Injectable 1 milliGRAM(s) IntraMuscular once PRN severe agitation  simethicone 80 milliGRAM(s) Chew every 8 hours PRN gas  sodium chloride 0.65% Nasal 2 Spray(s) Both Nostrils every 6 hours PRN congestion

## 2024-04-03 LAB
GLUCOSE BLDC GLUCOMTR-MCNC: 107 MG/DL — HIGH (ref 70–99)
GLUCOSE BLDC GLUCOMTR-MCNC: 184 MG/DL — HIGH (ref 70–99)

## 2024-04-03 PROCEDURE — 99232 SBSQ HOSP IP/OBS MODERATE 35: CPT

## 2024-04-03 RX ORDER — FLUPHENAZINE HYDROCHLORIDE 1 MG/1
2.5 TABLET, FILM COATED ORAL
Refills: 0 | Status: DISCONTINUED | OUTPATIENT
Start: 2024-04-03 | End: 2024-04-10

## 2024-04-03 RX ADMIN — LISINOPRIL 20 MILLIGRAM(S): 2.5 TABLET ORAL at 09:37

## 2024-04-03 RX ADMIN — Medication 1 MILLIGRAM(S): at 05:25

## 2024-04-03 RX ADMIN — CARBIDOPA AND LEVODOPA 1.5 TABLET(S): 25; 100 TABLET ORAL at 21:30

## 2024-04-03 RX ADMIN — DIVALPROEX SODIUM 750 MILLIGRAM(S): 500 TABLET, DELAYED RELEASE ORAL at 09:38

## 2024-04-03 RX ADMIN — CARBIDOPA AND LEVODOPA 1.5 TABLET(S): 25; 100 TABLET ORAL at 09:36

## 2024-04-03 RX ADMIN — Medication 75 MICROGRAM(S): at 05:25

## 2024-04-03 RX ADMIN — Medication 400 UNIT(S): at 09:37

## 2024-04-03 RX ADMIN — FLUPHENAZINE HYDROCHLORIDE 2 MILLIGRAM(S): 1 TABLET, FILM COATED ORAL at 09:37

## 2024-04-03 RX ADMIN — FLUPHENAZINE HYDROCHLORIDE 2.5 MILLIGRAM(S): 1 TABLET, FILM COATED ORAL at 21:31

## 2024-04-03 RX ADMIN — VALACYCLOVIR 1000 MILLIGRAM(S): 500 TABLET, FILM COATED ORAL at 09:37

## 2024-04-03 RX ADMIN — FLUPHENAZINE HYDROCHLORIDE 1 MILLIGRAM(S): 1 TABLET, FILM COATED ORAL at 05:25

## 2024-04-03 RX ADMIN — Medication 1 MILLIGRAM(S): at 09:33

## 2024-04-03 RX ADMIN — Medication 1 MILLIGRAM(S): at 21:32

## 2024-04-03 RX ADMIN — Medication 1 TABLET(S): at 21:32

## 2024-04-03 RX ADMIN — SENNA PLUS 2 TABLET(S): 8.6 TABLET ORAL at 21:32

## 2024-04-03 RX ADMIN — DIVALPROEX SODIUM 750 MILLIGRAM(S): 500 TABLET, DELAYED RELEASE ORAL at 21:30

## 2024-04-03 RX ADMIN — METFORMIN HYDROCHLORIDE 500 MILLIGRAM(S): 850 TABLET ORAL at 21:31

## 2024-04-03 RX ADMIN — METFORMIN HYDROCHLORIDE 500 MILLIGRAM(S): 850 TABLET ORAL at 09:37

## 2024-04-03 RX ADMIN — Medication 50 MILLIGRAM(S): at 09:36

## 2024-04-03 RX ADMIN — Medication 1: at 12:22

## 2024-04-03 NOTE — BH INPATIENT PSYCHIATRY PROGRESS NOTE - NSBHMETABOLIC_PSY_ALL_CORE_FT
BMI: BMI (kg/m2): 30.2 (01-15-24 @ 12:52)  HbA1c: A1C with Estimated Average Glucose Result: 7.6 % (01-12-24 @ 12:40)    Glucose: POCT Blood Glucose.: 184 mg/dL (04-03-24 @ 11:38)    BP: 147/95 (04-01-24 @ 08:14) (147/95 - 147/95)Vital Signs Last 24 Hrs  T(C): --  T(F): --  HR: --  BP: --  BP(mean): --  RR: --  SpO2: --    Orthostatic VS  04-03-24 @ 10:36  Lying BP: --/-- HR: --  Sitting BP: 145/86 HR: 98  Standing BP: 148/89 HR: 95  Site: upper left arm  Mode: electronic    Lipid Panel:
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
## 2024-04-03 NOTE — BH INPATIENT PSYCHIATRY PROGRESS NOTE - CURRENT MEDICATION
MEDICATIONS  (STANDING):  ammonium lactate 12% Lotion 1 Application(s) Topical two times a day  benztropine 1 milliGRAM(s) Oral two times a day  carbidopa/levodopa  25/100 1.5 Tablet(s) Oral <User Schedule>  cholecalciferol 400 Unit(s) Oral daily  divalproex Sprinkle 750 milliGRAM(s) Oral two times a day  fluPHENAZine 2.5 milliGRAM(s) Oral two times a day  glucagon  Injectable 1 milliGRAM(s) IntraMuscular once  hemorrhoidal Ointment 1 Application(s) Rectal daily  hydrocortisone 2.5% Ointment 1 Application(s) Topical two times a day  insulin lispro (ADMELOG) corrective regimen sliding scale   SubCutaneous three times a day before meals  levothyroxine 75 MICROGram(s) Oral daily  lisinopril 20 milliGRAM(s) Oral daily  metFORMIN 500 milliGRAM(s) Oral two times a day  metoprolol succinate ER 50 milliGRAM(s) Oral daily  multivitamin 1 Tablet(s) Oral at bedtime  polyethylene glycol 3350 17 Gram(s) Oral daily  senna 2 Tablet(s) Oral at bedtime  valACYclovir 1000 milliGRAM(s) Oral daily    MEDICATIONS  (PRN):  acetaminophen     Tablet .. 650 milliGRAM(s) Oral every 6 hours PRN Mild Pain (1 - 3), Moderate Pain (4 - 6)  albuterol    90 MICROgram(s) HFA Inhaler 2 Puff(s) Inhalation every 6 hours PRN Shortness of Breath and/or Wheezing  aluminum hydroxide/magnesium hydroxide/simethicone Suspension 30 milliLiter(s) Oral every 6 hours PRN Dyspepsia  bisacodyl Suppository 10 milliGRAM(s) Rectal daily PRN constipation  dextrose Oral Gel 15 Gram(s) Oral once PRN Blood Glucose LESS THAN 70 milliGRAM(s)/deciliter  fluPHENAZine 1 milliGRAM(s) Oral every 6 hours PRN agitation  fluPHENAZine  Injectable 1 milliGRAM(s) IntraMuscular once PRN agitation  LORazepam     Tablet 1 milliGRAM(s) Oral every 6 hours PRN agitation  LORazepam   Injectable 1 milliGRAM(s) IntraMuscular once PRN severe agitation  simethicone 80 milliGRAM(s) Chew every 8 hours PRN gas  sodium chloride 0.65% Nasal 2 Spray(s) Both Nostrils every 6 hours PRN congestion

## 2024-04-03 NOTE — BH INPATIENT PSYCHIATRY PROGRESS NOTE - NSBHCHARTREVIEWVS_PSY_A_CORE FT
Vital Signs Last 24 Hrs  T(C): --  T(F): --  HR: --  BP: --  BP(mean): --  RR: --  SpO2: --    Orthostatic VS  04-03-24 @ 10:36  Lying BP: --/-- HR: --  Sitting BP: 145/86 HR: 98  Standing BP: 148/89 HR: 95  Site: upper left arm  Mode: electronic

## 2024-04-03 NOTE — BH INPATIENT PSYCHIATRY PROGRESS NOTE - MSE UNSTRUCTURED FT
Appearance: casual grooming, no overt deficits in hygiene;  sig tremor, loose at elbows and wrist. Walks with anteroflexed gait though no shuffling or cogwheeling  Behavior: poorly related dismissive  Speech: loud, some coprolalia  Mood: irritable  Affect: congruent, stable, somewhat intense  Thought process: impoverished  Thought content: paranoid accusatory denies SI/HI. Yarsanism delusions. She believes she is [place] likely biblical reference.Focused on diet, believes she is getting scurvy from Vit C deficiency  Perceptual disturbances: no appearance of internal preoccupation  Cognition: adequately oriented  Insight: limited  Judgement: limited

## 2024-04-04 LAB — GLUCOSE BLDC GLUCOMTR-MCNC: 194 MG/DL — HIGH (ref 70–99)

## 2024-04-04 PROCEDURE — 99232 SBSQ HOSP IP/OBS MODERATE 35: CPT

## 2024-04-04 RX ADMIN — CARBIDOPA AND LEVODOPA 1.5 TABLET(S): 25; 100 TABLET ORAL at 21:17

## 2024-04-04 RX ADMIN — Medication 1 MILLIGRAM(S): at 08:02

## 2024-04-04 RX ADMIN — Medication 1 TABLET(S): at 21:07

## 2024-04-04 RX ADMIN — Medication 1: at 12:20

## 2024-04-04 RX ADMIN — POLYETHYLENE GLYCOL 3350 17 GRAM(S): 17 POWDER, FOR SOLUTION ORAL at 08:03

## 2024-04-04 RX ADMIN — Medication 1 APPLICATION(S): at 21:23

## 2024-04-04 RX ADMIN — LISINOPRIL 20 MILLIGRAM(S): 2.5 TABLET ORAL at 08:02

## 2024-04-04 RX ADMIN — METFORMIN HYDROCHLORIDE 500 MILLIGRAM(S): 850 TABLET ORAL at 08:03

## 2024-04-04 RX ADMIN — FLUPHENAZINE HYDROCHLORIDE 2.5 MILLIGRAM(S): 1 TABLET, FILM COATED ORAL at 21:07

## 2024-04-04 RX ADMIN — DIVALPROEX SODIUM 750 MILLIGRAM(S): 500 TABLET, DELAYED RELEASE ORAL at 08:02

## 2024-04-04 RX ADMIN — Medication 75 MICROGRAM(S): at 05:18

## 2024-04-04 RX ADMIN — SENNA PLUS 2 TABLET(S): 8.6 TABLET ORAL at 21:07

## 2024-04-04 RX ADMIN — CARBIDOPA AND LEVODOPA 1.5 TABLET(S): 25; 100 TABLET ORAL at 08:03

## 2024-04-04 RX ADMIN — Medication 1 MILLIGRAM(S): at 21:07

## 2024-04-04 RX ADMIN — Medication 400 UNIT(S): at 08:02

## 2024-04-04 RX ADMIN — METFORMIN HYDROCHLORIDE 500 MILLIGRAM(S): 850 TABLET ORAL at 21:07

## 2024-04-04 RX ADMIN — VALACYCLOVIR 1000 MILLIGRAM(S): 500 TABLET, FILM COATED ORAL at 08:02

## 2024-04-04 RX ADMIN — FLUPHENAZINE HYDROCHLORIDE 2.5 MILLIGRAM(S): 1 TABLET, FILM COATED ORAL at 08:02

## 2024-04-04 RX ADMIN — Medication 1 APPLICATION(S): at 21:08

## 2024-04-04 RX ADMIN — DIVALPROEX SODIUM 750 MILLIGRAM(S): 500 TABLET, DELAYED RELEASE ORAL at 21:06

## 2024-04-04 RX ADMIN — Medication 50 MILLIGRAM(S): at 08:02

## 2024-04-04 NOTE — BH INPATIENT PSYCHIATRY PROGRESS NOTE - NSBHMETABOLICLABS_PSY_ALL_CORE
Labs within last 12 months

## 2024-04-04 NOTE — BH INPATIENT PSYCHIATRY PROGRESS NOTE - NSCGISEVERITYSCORE_CAL_PSY_ALL_CORE
5
6
4
6
6
4
4
6
4
5
5
6
6
4
5
4
4
6
4
6
4
6
5
4
6
4
6
5
6
5
6
4
6
4
6
4
5
4
6
6
4
5
6
6
5
6

## 2024-04-04 NOTE — BH INPATIENT PSYCHIATRY PROGRESS NOTE - NSBHFUPINTERVALHXFT_PSY_A_CORE
Pt is seen and evaluated for follow up for schizoaffective disorder. Chart reviewed and discussed with nursing staff. VSS. No prns last night. Patient disruptive to others in dayroom, loud, intrusive, poor boundaries, makes sexually inappropriate remarks

## 2024-04-04 NOTE — BH INPATIENT PSYCHIATRY PROGRESS NOTE - NSCGIIMPROVESX_PSY_ALL_CORE
4 = No change - symptoms remain essentially unchanged
5 = Minimally worse - slightly worse but may not be clinically meaningful; may represent very little change in basic clinical status or functional capacity
3 = Minimally improved - slightly better with little or no clinically meaningful reduction of symptoms.  Represents very little change in basic clinical status, level of care, or functional capacity.
4 = No change - symptoms remain essentially unchanged
5 = Minimally worse - slightly worse but may not be clinically meaningful; may represent very little change in basic clinical status or functional capacity
3 = Minimally improved - slightly better with little or no clinically meaningful reduction of symptoms.  Represents very little change in basic clinical status, level of care, or functional capacity.
4 = No change - symptoms remain essentially unchanged
3 = Minimally improved - slightly better with little or no clinically meaningful reduction of symptoms.  Represents very little change in basic clinical status, level of care, or functional capacity.
4 = No change - symptoms remain essentially unchanged
3 = Minimally improved - slightly better with little or no clinically meaningful reduction of symptoms.  Represents very little change in basic clinical status, level of care, or functional capacity.
3 = Minimally improved - slightly better with little or no clinically meaningful reduction of symptoms.  Represents very little change in basic clinical status, level of care, or functional capacity.
5 = Minimally worse - slightly worse but may not be clinically meaningful; may represent very little change in basic clinical status or functional capacity
5 = Minimally worse - slightly worse but may not be clinically meaningful; may represent very little change in basic clinical status or functional capacity
3 = Minimally improved - slightly better with little or no clinically meaningful reduction of symptoms.  Represents very little change in basic clinical status, level of care, or functional capacity.
4 = No change - symptoms remain essentially unchanged
3 = Minimally improved - slightly better with little or no clinically meaningful reduction of symptoms.  Represents very little change in basic clinical status, level of care, or functional capacity.
3 = Minimally improved - slightly better with little or no clinically meaningful reduction of symptoms.  Represents very little change in basic clinical status, level of care, or functional capacity.
4 = No change - symptoms remain essentially unchanged
5 = Minimally worse - slightly worse but may not be clinically meaningful; may represent very little change in basic clinical status or functional capacity
5 = Minimally worse - slightly worse but may not be clinically meaningful; may represent very little change in basic clinical status or functional capacity
3 = Minimally improved - slightly better with little or no clinically meaningful reduction of symptoms.  Represents very little change in basic clinical status, level of care, or functional capacity.
3 = Minimally improved - slightly better with little or no clinically meaningful reduction of symptoms.  Represents very little change in basic clinical status, level of care, or functional capacity.
4 = No change - symptoms remain essentially unchanged
3 = Minimally improved - slightly better with little or no clinically meaningful reduction of symptoms.  Represents very little change in basic clinical status, level of care, or functional capacity.
4 = No change - symptoms remain essentially unchanged
3 = Minimally improved - slightly better with little or no clinically meaningful reduction of symptoms.  Represents very little change in basic clinical status, level of care, or functional capacity.
5 = Minimally worse - slightly worse but may not be clinically meaningful; may represent very little change in basic clinical status or functional capacity
4 = No change - symptoms remain essentially unchanged
5 = Minimally worse - slightly worse but may not be clinically meaningful; may represent very little change in basic clinical status or functional capacity
5 = Minimally worse - slightly worse but may not be clinically meaningful; may represent very little change in basic clinical status or functional capacity
3 = Minimally improved - slightly better with little or no clinically meaningful reduction of symptoms.  Represents very little change in basic clinical status, level of care, or functional capacity.
4 = No change - symptoms remain essentially unchanged
3 = Minimally improved - slightly better with little or no clinically meaningful reduction of symptoms.  Represents very little change in basic clinical status, level of care, or functional capacity.
3 = Minimally improved - slightly better with little or no clinically meaningful reduction of symptoms.  Represents very little change in basic clinical status, level of care, or functional capacity.
5 = Minimally worse - slightly worse but may not be clinically meaningful; may represent very little change in basic clinical status or functional capacity
3 = Minimally improved - slightly better with little or no clinically meaningful reduction of symptoms.  Represents very little change in basic clinical status, level of care, or functional capacity.
5 = Minimally worse - slightly worse but may not be clinically meaningful; may represent very little change in basic clinical status or functional capacity
3 = Minimally improved - slightly better with little or no clinically meaningful reduction of symptoms.  Represents very little change in basic clinical status, level of care, or functional capacity.
3 = Minimally improved - slightly better with little or no clinically meaningful reduction of symptoms.  Represents very little change in basic clinical status, level of care, or functional capacity.
4 = No change - symptoms remain essentially unchanged
5 = Minimally worse - slightly worse but may not be clinically meaningful; may represent very little change in basic clinical status or functional capacity
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
## 2024-04-04 NOTE — BH INPATIENT PSYCHIATRY PROGRESS NOTE - NSCGIIMPROVESXSCORE_CAL_PSY_ALL_CORE
3
3
5
4
3
5
3
4
3
5
3
5
3
4
3
4
3
4
4
3
5
3
4
3
5
3
5
5
3
5
5
3
3
5
5

## 2024-04-04 NOTE — BH INPATIENT PSYCHIATRY PROGRESS NOTE - NSCGISEVERILLNESS_PSY_ALL_CORE
4 = Moderately ill – overt symptoms causing noticeable, but modest, functional impairment or distress; symptom level may warrant medication
6 = Severely ill - disruptive pathology, behavior and function are frequently influenced by symptoms, may require assistance from others
6 = Severely ill - disruptive pathology, behavior and function are frequently influenced by symptoms, may require assistance from others
4 = Moderately ill – overt symptoms causing noticeable, but modest, functional impairment or distress; symptom level may warrant medication
6 = Severely ill - disruptive pathology, behavior and function are frequently influenced by symptoms, may require assistance from others
6 = Severely ill - disruptive pathology, behavior and function are frequently influenced by symptoms, may require assistance from others
5 = Markedly ill - intrusive symptoms that distinctly impair social/occupational function or cause intrusive levels of distress
6 = Severely ill - disruptive pathology, behavior and function are frequently influenced by symptoms, may require assistance from others
5 = Markedly ill - intrusive symptoms that distinctly impair social/occupational function or cause intrusive levels of distress
6 = Severely ill - disruptive pathology, behavior and function are frequently influenced by symptoms, may require assistance from others
4 = Moderately ill – overt symptoms causing noticeable, but modest, functional impairment or distress; symptom level may warrant medication
6 = Severely ill - disruptive pathology, behavior and function are frequently influenced by symptoms, may require assistance from others
4 = Moderately ill – overt symptoms causing noticeable, but modest, functional impairment or distress; symptom level may warrant medication
6 = Severely ill - disruptive pathology, behavior and function are frequently influenced by symptoms, may require assistance from others
4 = Moderately ill – overt symptoms causing noticeable, but modest, functional impairment or distress; symptom level may warrant medication
6 = Severely ill - disruptive pathology, behavior and function are frequently influenced by symptoms, may require assistance from others
4 = Moderately ill – overt symptoms causing noticeable, but modest, functional impairment or distress; symptom level may warrant medication
5 = Markedly ill - intrusive symptoms that distinctly impair social/occupational function or cause intrusive levels of distress
4 = Moderately ill – overt symptoms causing noticeable, but modest, functional impairment or distress; symptom level may warrant medication
4 = Moderately ill – overt symptoms causing noticeable, but modest, functional impairment or distress; symptom level may warrant medication
6 = Severely ill - disruptive pathology, behavior and function are frequently influenced by symptoms, may require assistance from others
6 = Severely ill - disruptive pathology, behavior and function are frequently influenced by symptoms, may require assistance from others
4 = Moderately ill – overt symptoms causing noticeable, but modest, functional impairment or distress; symptom level may warrant medication
4 = Moderately ill – overt symptoms causing noticeable, but modest, functional impairment or distress; symptom level may warrant medication
5 = Markedly ill - intrusive symptoms that distinctly impair social/occupational function or cause intrusive levels of distress
6 = Severely ill - disruptive pathology, behavior and function are frequently influenced by symptoms, may require assistance from others
6 = Severely ill - disruptive pathology, behavior and function are frequently influenced by symptoms, may require assistance from others
5 = Markedly ill - intrusive symptoms that distinctly impair social/occupational function or cause intrusive levels of distress
5 = Markedly ill - intrusive symptoms that distinctly impair social/occupational function or cause intrusive levels of distress
4 = Moderately ill – overt symptoms causing noticeable, but modest, functional impairment or distress; symptom level may warrant medication
6 = Severely ill - disruptive pathology, behavior and function are frequently influenced by symptoms, may require assistance from others
5 = Markedly ill - intrusive symptoms that distinctly impair social/occupational function or cause intrusive levels of distress
4 = Moderately ill – overt symptoms causing noticeable, but modest, functional impairment or distress; symptom level may warrant medication
4 = Moderately ill – overt symptoms causing noticeable, but modest, functional impairment or distress; symptom level may warrant medication
6 = Severely ill - disruptive pathology, behavior and function are frequently influenced by symptoms, may require assistance from others
4 = Moderately ill – overt symptoms causing noticeable, but modest, functional impairment or distress; symptom level may warrant medication
4 = Moderately ill – overt symptoms causing noticeable, but modest, functional impairment or distress; symptom level may warrant medication
6 = Severely ill - disruptive pathology, behavior and function are frequently influenced by symptoms, may require assistance from others
6 = Severely ill - disruptive pathology, behavior and function are frequently influenced by symptoms, may require assistance from others
4 = Moderately ill – overt symptoms causing noticeable, but modest, functional impairment or distress; symptom level may warrant medication
6 = Severely ill - disruptive pathology, behavior and function are frequently influenced by symptoms, may require assistance from others
4 = Moderately ill – overt symptoms causing noticeable, but modest, functional impairment or distress; symptom level may warrant medication
5 = Markedly ill - intrusive symptoms that distinctly impair social/occupational function or cause intrusive levels of distress
5 = Markedly ill - intrusive symptoms that distinctly impair social/occupational function or cause intrusive levels of distress
4 = Moderately ill – overt symptoms causing noticeable, but modest, functional impairment or distress; symptom level may warrant medication
6 = Severely ill - disruptive pathology, behavior and function are frequently influenced by symptoms, may require assistance from others
6 = Severely ill - disruptive pathology, behavior and function are frequently influenced by symptoms, may require assistance from others
5 = Markedly ill - intrusive symptoms that distinctly impair social/occupational function or cause intrusive levels of distress
6 = Severely ill - disruptive pathology, behavior and function are frequently influenced by symptoms, may require assistance from others
6 = Severely ill - disruptive pathology, behavior and function are frequently influenced by symptoms, may require assistance from others

## 2024-04-04 NOTE — BH INPATIENT PSYCHIATRY PROGRESS NOTE - MSE UNSTRUCTURED FT
Appearance: casual grooming, no overt deficits in hygiene;  sig tremor, loose at elbows and wrist. Walks with anteroflexed gait though no shuffling or cogwheeling  Behavior: poorly related dismissive  Speech: loud, some coprolalia  Mood: irritable  Affect: congruent, stable, somewhat intense  Thought process: impoverished  Thought content: paranoid accusatory denies SI/HI. Druze delusions. She believes she is [place] likely biblical reference.Focused on diet, believes she is getting scurvy from Vit C deficiency  Perceptual disturbances: no appearance of internal preoccupation  Cognition: adequately oriented  Insight: limited  Judgement: limited

## 2024-04-04 NOTE — BH INPATIENT PSYCHIATRY PROGRESS NOTE - NSBHMETABOLIC_PSY_ALL_CORE_FT
BMI: BMI (kg/m2): 30.2 (01-15-24 @ 12:52)  HbA1c: A1C with Estimated Average Glucose Result: 7.6 % (01-12-24 @ 12:40)    Glucose: POCT Blood Glucose.: 194 mg/dL (04-04-24 @ 12:02)    BP: --Vital Signs Last 24 Hrs  T(C): --  T(F): --  HR: --  BP: --  BP(mean): --  RR: --  SpO2: --    Orthostatic VS  04-03-24 @ 10:36  Lying BP: --/-- HR: --  Sitting BP: 145/86 HR: 98  Standing BP: 148/89 HR: 95  Site: upper left arm  Mode: electronic    Lipid Panel:

## 2024-04-05 LAB — GLUCOSE BLDC GLUCOMTR-MCNC: 239 MG/DL — HIGH (ref 70–99)

## 2024-04-05 PROCEDURE — 99232 SBSQ HOSP IP/OBS MODERATE 35: CPT

## 2024-04-05 RX ADMIN — Medication 400 UNIT(S): at 09:52

## 2024-04-05 RX ADMIN — DIVALPROEX SODIUM 750 MILLIGRAM(S): 500 TABLET, DELAYED RELEASE ORAL at 20:01

## 2024-04-05 RX ADMIN — LISINOPRIL 20 MILLIGRAM(S): 2.5 TABLET ORAL at 09:53

## 2024-04-05 RX ADMIN — FLUPHENAZINE HYDROCHLORIDE 2.5 MILLIGRAM(S): 1 TABLET, FILM COATED ORAL at 09:52

## 2024-04-05 RX ADMIN — VALACYCLOVIR 1000 MILLIGRAM(S): 500 TABLET, FILM COATED ORAL at 09:52

## 2024-04-05 RX ADMIN — Medication 75 MICROGRAM(S): at 06:43

## 2024-04-05 RX ADMIN — Medication 1 MILLIGRAM(S): at 09:53

## 2024-04-05 RX ADMIN — CARBIDOPA AND LEVODOPA 1.5 TABLET(S): 25; 100 TABLET ORAL at 09:53

## 2024-04-05 RX ADMIN — Medication 50 MILLIGRAM(S): at 09:53

## 2024-04-05 RX ADMIN — SENNA PLUS 2 TABLET(S): 8.6 TABLET ORAL at 20:01

## 2024-04-05 RX ADMIN — Medication 1 TABLET(S): at 20:01

## 2024-04-05 RX ADMIN — Medication 1 MILLIGRAM(S): at 20:00

## 2024-04-05 RX ADMIN — DIVALPROEX SODIUM 750 MILLIGRAM(S): 500 TABLET, DELAYED RELEASE ORAL at 09:52

## 2024-04-05 RX ADMIN — METFORMIN HYDROCHLORIDE 500 MILLIGRAM(S): 850 TABLET ORAL at 09:53

## 2024-04-05 RX ADMIN — METFORMIN HYDROCHLORIDE 500 MILLIGRAM(S): 850 TABLET ORAL at 20:00

## 2024-04-05 RX ADMIN — FLUPHENAZINE HYDROCHLORIDE 2.5 MILLIGRAM(S): 1 TABLET, FILM COATED ORAL at 20:01

## 2024-04-05 RX ADMIN — CARBIDOPA AND LEVODOPA 1.5 TABLET(S): 25; 100 TABLET ORAL at 20:00

## 2024-04-05 RX ADMIN — POLYETHYLENE GLYCOL 3350 17 GRAM(S): 17 POWDER, FOR SOLUTION ORAL at 10:05

## 2024-04-05 NOTE — BH INPATIENT PSYCHIATRY PROGRESS NOTE - NSBHCHARTREVIEWVS_PSY_A_CORE FT
Vital Signs Last 24 Hrs  T(C): 36.8 (04-05-24 @ 07:56), Max: 36.8 (04-05-24 @ 07:56)  T(F): 98.2 (04-05-24 @ 07:56), Max: 98.2 (04-05-24 @ 07:56)  HR: --  BP: --  BP(mean): --  RR: --  SpO2: --    Orthostatic VS  04-05-24 @ 07:56  Lying BP: --/-- HR: --  Sitting BP: 153/90 HR: 83  Standing BP: --/-- HR: --  Site: upper left arm  Mode: electronic
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
## 2024-04-05 NOTE — BH INPATIENT PSYCHIATRY PROGRESS NOTE - NSBHMETABOLIC_PSY_ALL_CORE_FT
BMI: BMI (kg/m2): 30.2 (01-15-24 @ 12:52)  HbA1c: A1C with Estimated Average Glucose Result: 7.6 % (01-12-24 @ 12:40)    Glucose: POCT Blood Glucose.: 194 mg/dL (04-04-24 @ 12:02)    BP: --Vital Signs Last 24 Hrs  T(C): 36.8 (04-05-24 @ 07:56), Max: 36.8 (04-05-24 @ 07:56)  T(F): 98.2 (04-05-24 @ 07:56), Max: 98.2 (04-05-24 @ 07:56)  HR: --  BP: --  BP(mean): --  RR: --  SpO2: --    Orthostatic VS  04-05-24 @ 07:56  Lying BP: --/-- HR: --  Sitting BP: 153/90 HR: 83  Standing BP: --/-- HR: --  Site: upper left arm  Mode: electronic    Lipid Panel:

## 2024-04-05 NOTE — BH INPATIENT PSYCHIATRY PROGRESS NOTE - MSE UNSTRUCTURED FT
Appearance: casual grooming, no overt deficits in hygiene;  sig tremor, loose at elbows and wrist. Walks with anteroflexed gait though no shuffling or cogwheeling  Behavior: poorly related dismissive  Speech: loud, some coprolalia  Mood: irritable  Affect: congruent, stable, somewhat intense  Thought process: impoverished  Thought content: paranoid accusatory denies SI/HI. Jainism delusions. She believes she is [place] Barr[place] likely biblical reference   Perceptual disturbances: no appearance of internal preoccupation  Cognition: adequately oriented  Insight: limited  Judgement: limited

## 2024-04-06 LAB
GLUCOSE BLDC GLUCOMTR-MCNC: 137 MG/DL — HIGH (ref 70–99)
GLUCOSE BLDC GLUCOMTR-MCNC: 229 MG/DL — HIGH (ref 70–99)

## 2024-04-06 RX ADMIN — POLYETHYLENE GLYCOL 3350 17 GRAM(S): 17 POWDER, FOR SOLUTION ORAL at 09:23

## 2024-04-06 RX ADMIN — METFORMIN HYDROCHLORIDE 500 MILLIGRAM(S): 850 TABLET ORAL at 09:22

## 2024-04-06 RX ADMIN — Medication 400 UNIT(S): at 09:23

## 2024-04-06 RX ADMIN — DIVALPROEX SODIUM 750 MILLIGRAM(S): 500 TABLET, DELAYED RELEASE ORAL at 20:39

## 2024-04-06 RX ADMIN — Medication 1 TABLET(S): at 20:39

## 2024-04-06 RX ADMIN — VALACYCLOVIR 1000 MILLIGRAM(S): 500 TABLET, FILM COATED ORAL at 09:22

## 2024-04-06 RX ADMIN — Medication 50 MILLIGRAM(S): at 09:22

## 2024-04-06 RX ADMIN — Medication 1 MILLIGRAM(S): at 20:39

## 2024-04-06 RX ADMIN — SENNA PLUS 2 TABLET(S): 8.6 TABLET ORAL at 20:39

## 2024-04-06 RX ADMIN — DIVALPROEX SODIUM 750 MILLIGRAM(S): 500 TABLET, DELAYED RELEASE ORAL at 09:22

## 2024-04-06 RX ADMIN — CARBIDOPA AND LEVODOPA 1.5 TABLET(S): 25; 100 TABLET ORAL at 09:23

## 2024-04-06 RX ADMIN — FLUPHENAZINE HYDROCHLORIDE 2.5 MILLIGRAM(S): 1 TABLET, FILM COATED ORAL at 09:22

## 2024-04-06 RX ADMIN — LISINOPRIL 20 MILLIGRAM(S): 2.5 TABLET ORAL at 09:22

## 2024-04-06 RX ADMIN — CARBIDOPA AND LEVODOPA 1.5 TABLET(S): 25; 100 TABLET ORAL at 20:39

## 2024-04-06 RX ADMIN — Medication 75 MICROGRAM(S): at 06:22

## 2024-04-06 RX ADMIN — FLUPHENAZINE HYDROCHLORIDE 2.5 MILLIGRAM(S): 1 TABLET, FILM COATED ORAL at 20:39

## 2024-04-06 RX ADMIN — METFORMIN HYDROCHLORIDE 500 MILLIGRAM(S): 850 TABLET ORAL at 20:39

## 2024-04-07 RX ADMIN — SENNA PLUS 2 TABLET(S): 8.6 TABLET ORAL at 21:07

## 2024-04-07 RX ADMIN — POLYETHYLENE GLYCOL 3350 17 GRAM(S): 17 POWDER, FOR SOLUTION ORAL at 08:04

## 2024-04-07 RX ADMIN — CARBIDOPA AND LEVODOPA 1.5 TABLET(S): 25; 100 TABLET ORAL at 08:03

## 2024-04-07 RX ADMIN — Medication 1 MILLIGRAM(S): at 21:09

## 2024-04-07 RX ADMIN — METFORMIN HYDROCHLORIDE 500 MILLIGRAM(S): 850 TABLET ORAL at 08:04

## 2024-04-07 RX ADMIN — Medication 75 MICROGRAM(S): at 06:28

## 2024-04-07 RX ADMIN — DIVALPROEX SODIUM 750 MILLIGRAM(S): 500 TABLET, DELAYED RELEASE ORAL at 21:08

## 2024-04-07 RX ADMIN — FLUPHENAZINE HYDROCHLORIDE 2.5 MILLIGRAM(S): 1 TABLET, FILM COATED ORAL at 08:03

## 2024-04-07 RX ADMIN — DIVALPROEX SODIUM 750 MILLIGRAM(S): 500 TABLET, DELAYED RELEASE ORAL at 08:02

## 2024-04-07 RX ADMIN — VALACYCLOVIR 1000 MILLIGRAM(S): 500 TABLET, FILM COATED ORAL at 08:03

## 2024-04-07 RX ADMIN — Medication 650 MILLIGRAM(S): at 20:48

## 2024-04-07 RX ADMIN — Medication 50 MILLIGRAM(S): at 08:03

## 2024-04-07 RX ADMIN — Medication 1 TABLET(S): at 21:09

## 2024-04-07 RX ADMIN — Medication 400 UNIT(S): at 08:04

## 2024-04-07 RX ADMIN — Medication 1 MILLIGRAM(S): at 08:03

## 2024-04-07 RX ADMIN — FLUPHENAZINE HYDROCHLORIDE 2.5 MILLIGRAM(S): 1 TABLET, FILM COATED ORAL at 21:43

## 2024-04-07 RX ADMIN — CARBIDOPA AND LEVODOPA 1.5 TABLET(S): 25; 100 TABLET ORAL at 21:42

## 2024-04-07 RX ADMIN — METFORMIN HYDROCHLORIDE 500 MILLIGRAM(S): 850 TABLET ORAL at 21:09

## 2024-04-07 RX ADMIN — Medication 650 MILLIGRAM(S): at 19:48

## 2024-04-07 RX ADMIN — LISINOPRIL 20 MILLIGRAM(S): 2.5 TABLET ORAL at 08:03

## 2024-04-08 LAB — GLUCOSE BLDC GLUCOMTR-MCNC: 162 MG/DL — HIGH (ref 70–99)

## 2024-04-08 PROCEDURE — 99232 SBSQ HOSP IP/OBS MODERATE 35: CPT

## 2024-04-08 RX ADMIN — DIVALPROEX SODIUM 750 MILLIGRAM(S): 500 TABLET, DELAYED RELEASE ORAL at 10:14

## 2024-04-08 RX ADMIN — SENNA PLUS 2 TABLET(S): 8.6 TABLET ORAL at 21:55

## 2024-04-08 RX ADMIN — METFORMIN HYDROCHLORIDE 500 MILLIGRAM(S): 850 TABLET ORAL at 21:55

## 2024-04-08 RX ADMIN — Medication 1 TABLET(S): at 21:55

## 2024-04-08 RX ADMIN — Medication 1 MILLIGRAM(S): at 10:15

## 2024-04-08 RX ADMIN — FLUPHENAZINE HYDROCHLORIDE 2.5 MILLIGRAM(S): 1 TABLET, FILM COATED ORAL at 10:14

## 2024-04-08 RX ADMIN — POLYETHYLENE GLYCOL 3350 17 GRAM(S): 17 POWDER, FOR SOLUTION ORAL at 10:14

## 2024-04-08 RX ADMIN — METFORMIN HYDROCHLORIDE 500 MILLIGRAM(S): 850 TABLET ORAL at 10:16

## 2024-04-08 RX ADMIN — FLUPHENAZINE HYDROCHLORIDE 1 MILLIGRAM(S): 1 TABLET, FILM COATED ORAL at 13:12

## 2024-04-08 RX ADMIN — CARBIDOPA AND LEVODOPA 1.5 TABLET(S): 25; 100 TABLET ORAL at 10:15

## 2024-04-08 RX ADMIN — LISINOPRIL 20 MILLIGRAM(S): 2.5 TABLET ORAL at 10:15

## 2024-04-08 RX ADMIN — Medication 1 MILLIGRAM(S): at 21:55

## 2024-04-08 RX ADMIN — DIVALPROEX SODIUM 750 MILLIGRAM(S): 500 TABLET, DELAYED RELEASE ORAL at 21:54

## 2024-04-08 RX ADMIN — Medication 50 MILLIGRAM(S): at 10:15

## 2024-04-08 RX ADMIN — FLUPHENAZINE HYDROCHLORIDE 2.5 MILLIGRAM(S): 1 TABLET, FILM COATED ORAL at 21:55

## 2024-04-08 RX ADMIN — VALACYCLOVIR 1000 MILLIGRAM(S): 500 TABLET, FILM COATED ORAL at 10:16

## 2024-04-08 RX ADMIN — CARBIDOPA AND LEVODOPA 1.5 TABLET(S): 25; 100 TABLET ORAL at 21:57

## 2024-04-08 RX ADMIN — Medication 400 UNIT(S): at 10:16

## 2024-04-08 NOTE — BH INPATIENT PSYCHIATRY PROGRESS NOTE - MSE UNSTRUCTURED FT
Appearance: casual grooming, no overt deficits in hygiene;  sig tremor, loose at elbows and wrist. Walks with anteroflexed gait though no shuffling or cogwheeling  Behavior: poorly related dismissive  Speech: loud, some coprolalia  Mood: irritable  Affect: congruent, stable, somewhat intense  Thought process: impoverished  Thought content: paranoid accusatory denies SI/HI. Congregational delusions. She believes she is [place] Barr[place] likely biblical reference   Perceptual disturbances: no appearance of internal preoccupation  Cognition: adequately oriented  Insight: limited  Judgement: limited

## 2024-04-08 NOTE — PATIENT PROFILE ADULT - FUNCTIONAL ASSESSMENT - BASIC MOBILITY 5.
Daily Call Note: [facility] daily call complete.  Pt reports she is feeling well.  3.9 weight gain- Dr Ahuja notified.  Pt is taking Torsemide as ordered, does endorse edema to BLE.  Has PCP appt tomorrow morning.  B/P 132/78.      1020- Per Dr Ahuja pt to take an additional 20 mg Torsemide, pt aware     Pt Education:  POC   Barriers:   Topics for Daily Review:  VS/ weight   Pt demonstrates clear understanding: Yes    Daily Weight:  179.0 lbs   There were no vitals filed for this visit.   Last 3 Weights:  4/7 175.1 lbs   Wt Readings from Last 7 Encounters:   04/04/24 77.8 kg (171 lb 8 oz)   04/01/24 84.7 kg (186 lb 11.7 oz)   02/22/24 81.6 kg (180 lb)       Masimo Device: No   Masimo Clinical Impression:     Virtual Visits--Scheduled (Most Recent Date at Top)  Follow up Appointments  Recent Visits  No visits were found meeting these conditions.  Showing recent visits within past 30 days and meeting all other requirements  Future Appointments  No visits were found meeting these conditions.  Showing future appointments within next 90 days and meeting all other requirements       Frequency of RN Calls & Virtual Visits per Team Agreement: [facility] Frequency: Daily    Medication issues Addressed (what was done):     Follow up appointments scheduled by [facility] Staff:   Referrals made by [facility] staff:     [facility] Transitions Assessment    Patient's Address:   [address] [place] [id number]  **  If this is not the address patient will receive services - alert team and address in EMR**       Patient Contacts:  Extended Emergency Contact Information  Primary Emergency Contact: Kocher,Lori  Home Phone: [phone]  Relation: Child  Secondary Emergency Contact: Kocher,Chris  Home Phone: [phone]  Relation: None                                Patient's Preferred Phone: [phone]  Patient's E-mail: [email or web address]                                       3 = A little assistance

## 2024-04-08 NOTE — BH INPATIENT PSYCHIATRY PROGRESS NOTE - NSBHCHARTREVIEWVS_PSY_A_CORE FT
Vital Signs Last 24 Hrs  T(C): --  T(F): --  HR: --  BP: --  BP(mean): --  RR: 16 (04-08-24 @ 09:55) (16 - 16)  SpO2: --    Orthostatic VS  04-08-24 @ 09:55  Lying BP: --/-- HR: --  Sitting BP: 118/66 HR: 70  Standing BP: --/-- HR: --  Site: --  Mode: --

## 2024-04-08 NOTE — BH INPATIENT PSYCHIATRY PROGRESS NOTE - NSBHMETABOLIC_PSY_ALL_CORE_FT
BMI: BMI (kg/m2): 30.2 (01-15-24 @ 12:52)  HbA1c: A1C with Estimated Average Glucose Result: 7.6 % (01-12-24 @ 12:40)    Glucose: POCT Blood Glucose.: 137 mg/dL (04-06-24 @ 16:42)    BP: --Vital Signs Last 24 Hrs  T(C): --  T(F): --  HR: --  BP: --  BP(mean): --  RR: 16 (04-08-24 @ 09:55) (16 - 16)  SpO2: --    Orthostatic VS  04-08-24 @ 09:55  Lying BP: --/-- HR: --  Sitting BP: 118/66 HR: 70  Standing BP: --/-- HR: --  Site: --  Mode: --    Lipid Panel:
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
## 2024-04-08 NOTE — BH INPATIENT PSYCHIATRY PROGRESS NOTE - NSBHFUPINTERVALHXFT_PSY_A_CORE
f/up SAD, care discussed with tx team, VSS. Patient was initially irritable, then eventually spoke with writer. Patient observed mumbling to self, stated that she was with the lord. Patient reported fair sleep and appetite. VSS. Raising voice with staff, not aggressive some gains since on fluphenazine

## 2024-04-09 LAB
GLUCOSE BLDC GLUCOMTR-MCNC: 158 MG/DL — HIGH (ref 70–99)
GLUCOSE BLDC GLUCOMTR-MCNC: 211 MG/DL — HIGH (ref 70–99)

## 2024-04-09 PROCEDURE — 99232 SBSQ HOSP IP/OBS MODERATE 35: CPT

## 2024-04-09 RX ADMIN — Medication 50 MILLIGRAM(S): at 09:41

## 2024-04-09 RX ADMIN — Medication 400 UNIT(S): at 09:43

## 2024-04-09 RX ADMIN — DIVALPROEX SODIUM 750 MILLIGRAM(S): 500 TABLET, DELAYED RELEASE ORAL at 21:33

## 2024-04-09 RX ADMIN — Medication 2: at 17:05

## 2024-04-09 RX ADMIN — DIVALPROEX SODIUM 750 MILLIGRAM(S): 500 TABLET, DELAYED RELEASE ORAL at 09:42

## 2024-04-09 RX ADMIN — SENNA PLUS 2 TABLET(S): 8.6 TABLET ORAL at 21:32

## 2024-04-09 RX ADMIN — POLYETHYLENE GLYCOL 3350 17 GRAM(S): 17 POWDER, FOR SOLUTION ORAL at 09:40

## 2024-04-09 RX ADMIN — METFORMIN HYDROCHLORIDE 500 MILLIGRAM(S): 850 TABLET ORAL at 09:41

## 2024-04-09 RX ADMIN — FLUPHENAZINE HYDROCHLORIDE 2.5 MILLIGRAM(S): 1 TABLET, FILM COATED ORAL at 21:33

## 2024-04-09 RX ADMIN — METFORMIN HYDROCHLORIDE 500 MILLIGRAM(S): 850 TABLET ORAL at 21:32

## 2024-04-09 RX ADMIN — Medication 1 MILLIGRAM(S): at 21:32

## 2024-04-09 RX ADMIN — Medication 1: at 08:13

## 2024-04-09 RX ADMIN — LISINOPRIL 20 MILLIGRAM(S): 2.5 TABLET ORAL at 09:41

## 2024-04-09 RX ADMIN — CARBIDOPA AND LEVODOPA 1.5 TABLET(S): 25; 100 TABLET ORAL at 21:36

## 2024-04-09 RX ADMIN — Medication 1 TABLET(S): at 21:33

## 2024-04-09 RX ADMIN — CARBIDOPA AND LEVODOPA 1.5 TABLET(S): 25; 100 TABLET ORAL at 09:50

## 2024-04-09 RX ADMIN — FLUPHENAZINE HYDROCHLORIDE 2.5 MILLIGRAM(S): 1 TABLET, FILM COATED ORAL at 09:41

## 2024-04-09 RX ADMIN — Medication 75 MICROGRAM(S): at 06:36

## 2024-04-09 RX ADMIN — VALACYCLOVIR 1000 MILLIGRAM(S): 500 TABLET, FILM COATED ORAL at 09:42

## 2024-04-09 RX ADMIN — Medication 1 MILLIGRAM(S): at 09:40

## 2024-04-09 NOTE — BH INPATIENT PSYCHIATRY PROGRESS NOTE - NSBHFUPINTERVALHXFT_PSY_A_CORE
f/up SAD, care discussed with tx team, RYAN. Patient loud intrusive but less severe. Eating sleeping okay

## 2024-04-09 NOTE — BH INPATIENT PSYCHIATRY PROGRESS NOTE - NSBHASSESSSUMMFT_PSY_ALL_CORE
2/5 Patient showing some improving trend will give more time, if needs more medication titration due to ongoing symptoms will increase Seroquel not haldol  2/6 Improving cont meds, offered prn suppository and simethicone  2/7 Slowly improving will cont current meds, eval need for further increase Seroquel  2/8 Partial response continue meds except will increase Seroquel as was on 250mg/d PTA  2/9 Overall gains, still regresses and becomes difficult to manage will increase Seroquel to 200mg/d in divided dosing  2/12 A little calmer since started on additional mid day, cont other meds w/o change  2/13 Patient better still disorganized, hyper Alevism but less agitated, cont current treatment  2/14 Improved globally with some symptom variability. Cont current med consider further increment Seroquel  2/15 Improved cont current regimen for now  2/16 Showing  some gains continue treatment for now at current doses   2/20 Improved, cont current rx. Scheduled urogyn for pessary replacement  2/21 Improved with variable intrusiveness. Cont meds will increase Miralax and Senna  2/22 Improving cont current rx, observe for effect of re-titration of laxatives  2/23 Lost balance mainly due to behavior however to be safe will lower haldol and Klonopin modestly and observe. Otherwise generally manageable  2/24: No PRNs needed overnight. Compliant with meds. As per staff last night pt was banging her Bible against the wall and lost her balance, staff held her. Pt remains talkative and intrusive at times but overall calmer and redirectable.   2/25: remains labile, loud, easily agitated but more redirectable.  2/26 Patient seen by medicine no evidence injury needing imaging, just Tylenol prn. Will cont current meds, if anteroflexion dosesn't improve with recent drop in haldol will consider further dose reduction  2/27 Has had slow increase in EPS despite no increase in dose and some reduction will hold haldol to reduce EPS and restart at lower dose  2/28 Patient with change in med tolerance some agitation and sedation. Will hold haldol tonight consider changing to Prolixin as may be a little better for EPS and will check labs to look for any medical issue  2/29 Labile irritable, increased tremor improved with washout of haldol. Will restart haldol but see if she can be stabilized at low dose to minimize tremor. COuld consider increasing Seroquel  3/1 Improved will see if she can stabilize on Seroquel and low dose haldol to minimize EPS will address nasal symptoms  3/2 Patient better less bent tremor less calmer. Attempt to find dose of haldol that control psychosis agitation with manageable tremor. May need to try 2mg bid as 1mg big may be subtherapeutic  3/4 Some improvement in that less agitated but EPS better will cont with low dose haldol Issues is that both side effects and decompenstion have had long lags related to dose changes so unclear if at haldol 1mg bid she is better EPS wise but will gradually decompensate. Cont to observe consider increase to 1mg tid. Patient's 2PC expiring she remains delusional disorganized not yet able to managed in SNF will apply for further retention  3/5 Showing some improvement with less agitation despite delusions but also less EPS with lowered haldol. Cont meds will reduce klonopin from 0.25mg tid to bid to reduce polypharm cont other meds. If calmer less disorganized will ask PT if she can walker trial  3/6: behavior in better control, delusional, suspected AH. taking meds.   3/7: can become irritable, redirectable. c/w current meds, ambulating hunched over.   3/8: went to interventional Urology on Fri, UA ordered, ultrasound of bladder scheduled for Monday at 10 AM,  and ob gyn for pessary ordered. If blood in urine—send back to urology office for cysto (please see the full consult in chart)  3/9: Somewhat sedated this AM, calm, not agitated, will follow and adjust meds as necessary (clonazepam)   3/10: More alert today, suspicious of examiner, UA negative  3/11: Mistrustfulness remains, leading to irritability with staff; no prns given   3/12: Labile and irritability noted, poor insight and requires continued inpatient psychiatric hospitalization for safety and stabilization.  3/13: labile, irritable on approach, redirectable. c/w current meds. Delusions noted on exam.  3/14: Labile and irritability noted, poor insight and requires continued inpatient psychiatric hospitalization for safety and stabilization.  3/15: labile, irritable on approach, redirectable. c/w current meds.  3/18 doing poor on regimen where haldol on low dose ineffective or causes sig tremor and anterflexion on higher doses. Will stop and review alternatives such as prolixin or risperidone  3/19 Patient with less sedation , still sig trremor. Delusional irritable, in altercation with roommate sees her room as belonging solely to her. Cont off haldol consider second antipsychotic has been on olanzapine, Prolixin, risperidone, she believes has not been on Abilify will consider this as trial with option for SHAFER  3/20 Psychotic irritable disorganized may go back to trial of olanzapine to allow for monotherapy and less EPS. Spoke with cally will lower Valtrex as dose is above recommended for maintenance  3/21 Louder more intrusive. Will titrate olanzapine ands use prns target 20-30mg, may need second antipsychotic given treatment resistance and clozapine not feasible  3/22 Patient sl calmer with po and prn olanzapine still lous intrusive irritable, needing prns. will increase to 5mg tid. Plan when calmer attempt to simplify regimen to bid to improve compliance.  3/23: no PRN overnight/this am. Noted walking in dayroom, calmer, no aggression now.  3/24: s/p fall, no injury, noted walking in dining area, remains hostile, easily agitated and uncooperative.   3/25 Patient loud irritable  but somewhat less disorganized. Seen by PT not assessed as sig Parkinsonized. will cont meds, will try to check VPA level in AM. Feel due to reduced disorganization patient no longer requires  will place on routine checks , will check labs including VPa level in AM  3/26 Modestly better on olanzpine. Cont trial consider increasing to 10mg bid.  Reorder labs when patient more cooperative  3/27 Modest improvement, cont olanzapine but will now move to bid dosing also Depakote and Sinemet will be made bid to decrease number of times per day she needs to be approached for medication  3/28 Modest gains from olanzapine with less EPS than when on haloperidol. COnt treatment will increase to 10mg bid try to obtain VPA level in AM  3/29 Cont olanzapine trial. Considering other options patient refuses li and likely would be hard to follow with labs. Patient feels Abilify helped in past will consider rechallenge with cross taper  3/30 Poor reponse to Zyprexa will change prn to Ativan /fluphenazine consider then standing prolicxin at low dose as only time patient stable was when she was on haldol but then developed EPS at higher dose  3/31 Remains highly agitated. Only time patient better was combo atypical and conventional will add low dose fluphenazine to olanzapine  4/1 Agitated needing prn. Will d/c olanzapine as has been completely ineffective. Will use Prolixin as she appears to respond to conventional antipsychotics but given EPS vulnerability will add Cogentin as there is no other option to manage EPS  4/2 Loud agitated psychotic no worse off olanzapine will increase fluphenazine to 2mg  bid  4/3 Loud, irritable, Alevism delusions, very disorganized behavior such as placing self on floors, placing paper crosses on floor. Will increase Prolixin to 2.5mg bid  4/4 Psychotic , agitated disorganized. Tolerating current Prolixin, plan titrate but need to give time on each dose as she tends to have side effects lags.   4/5: irritable, religiously focused, taking medications.   4/8 Modest gains, reduce need for prns since starting prolixin, cont current dose based on level of EPS may increase to 7.5mg/d vs re add Seroquel  4/9 Somewhat better will hold off on adding another med. Will give more time on each dose based that in past she had delayed side effects

## 2024-04-09 NOTE — BH INPATIENT PSYCHIATRY PROGRESS NOTE - MSE UNSTRUCTURED FT
Appearance: casual grooming, no overt deficits in hygiene;  sig tremor, loose at elbows and wrist. Walks with anteroflexed gait though no shuffling or cogwheeling  Behavior: sl improved asked how writer was doing  Speech: loud, some coprolalia  Mood: irritable, less so  Affect: congruent, stable, somewhat intense  Thought process: impoverished  Thought content: paranoid accusatory denies SI/HI. Temple delusions. with less pre-occupation   Perceptual disturbances: no appearance of internal preoccupation  Cognition: adequately oriented  Insight: limited  Judgement: limited   Appearance: casual grooming, no overt deficits in hygiene;  sig tremor, loose at elbows and wrist. Walks with anteroflexed gait though no shuffling or cogwheeling  Behavior: sl improved asked how writer was doing  Speech: loud, some coprolalia  Mood: irritable, less so  Affect: congruent, stable, somewhat intense  Thought process: impoverished  Thought content: paranoid accusatory denies SI/HI. Episcopal delusions. with less pre-occupation Believes she had a baby from a staff member  Perceptual disturbances: no appearance of internal preoccupation  Cognition: adequately oriented  Insight: limited  Judgement: limited

## 2024-04-09 NOTE — BH INPATIENT PSYCHIATRY PROGRESS NOTE - NSBHCHARTREVIEWVS_PSY_A_CORE FT
Vital Signs Last 24 Hrs  T(C): --  T(F): --  HR: 78 (04-09-24 @ 09:42) (78 - 78)  BP: 132/74 (04-09-24 @ 09:42) (132/74 - 132/74)  BP(mean): --  RR: 16 (04-09-24 @ 09:42) (16 - 16)  SpO2: --    Orthostatic VS  04-08-24 @ 09:55  Lying BP: --/-- HR: --  Sitting BP: 118/66 HR: 70  Standing BP: --/-- HR: --  Site: --  Mode: --

## 2024-04-09 NOTE — BH INPATIENT PSYCHIATRY PROGRESS NOTE - NSBHMETABOLIC_PSY_ALL_CORE_FT
BMI: BMI (kg/m2): 30.2 (01-15-24 @ 12:52)  HbA1c: A1C with Estimated Average Glucose Result: 7.6 % (01-12-24 @ 12:40)    Glucose: POCT Blood Glucose.: 158 mg/dL (04-09-24 @ 08:10)    BP: 132/74 (04-09-24 @ 09:42) (132/74 - 132/74)Vital Signs Last 24 Hrs  T(C): --  T(F): --  HR: 78 (04-09-24 @ 09:42) (78 - 78)  BP: 132/74 (04-09-24 @ 09:42) (132/74 - 132/74)  BP(mean): --  RR: 16 (04-09-24 @ 09:42) (16 - 16)  SpO2: --    Orthostatic VS  04-08-24 @ 09:55  Lying BP: --/-- HR: --  Sitting BP: 118/66 HR: 70  Standing BP: --/-- HR: --  Site: --  Mode: --    Lipid Panel:

## 2024-04-10 PROCEDURE — 99232 SBSQ HOSP IP/OBS MODERATE 35: CPT

## 2024-04-10 RX ORDER — FLUPHENAZINE HYDROCHLORIDE 1 MG/1
2 TABLET, FILM COATED ORAL ONCE
Refills: 0 | Status: DISCONTINUED | OUTPATIENT
Start: 2024-04-10 | End: 2024-05-11

## 2024-04-10 RX ORDER — FLUPHENAZINE HYDROCHLORIDE 1 MG/1
5 TABLET, FILM COATED ORAL DAILY
Refills: 0 | Status: DISCONTINUED | OUTPATIENT
Start: 2024-04-10 | End: 2024-04-23

## 2024-04-10 RX ORDER — DIPHENHYDRAMINE HCL 50 MG
25 CAPSULE ORAL ONCE
Refills: 0 | Status: DISCONTINUED | OUTPATIENT
Start: 2024-04-10 | End: 2024-05-17

## 2024-04-10 RX ORDER — FLUPHENAZINE HYDROCHLORIDE 1 MG/1
2.5 TABLET, FILM COATED ORAL EVERY 6 HOURS
Refills: 0 | Status: DISCONTINUED | OUTPATIENT
Start: 2024-04-10 | End: 2024-05-04

## 2024-04-10 RX ORDER — FLUPHENAZINE HYDROCHLORIDE 1 MG/1
2 TABLET, FILM COATED ORAL ONCE
Refills: 0 | Status: COMPLETED | OUTPATIENT
Start: 2024-04-10 | End: 2024-04-10

## 2024-04-10 RX ORDER — FLUPHENAZINE HYDROCHLORIDE 1 MG/1
2.5 TABLET, FILM COATED ORAL AT BEDTIME
Refills: 0 | Status: DISCONTINUED | OUTPATIENT
Start: 2024-04-10 | End: 2024-04-23

## 2024-04-10 RX ORDER — DIPHENHYDRAMINE HCL 50 MG
25 CAPSULE ORAL ONCE
Refills: 0 | Status: COMPLETED | OUTPATIENT
Start: 2024-04-10 | End: 2024-04-10

## 2024-04-10 RX ADMIN — CARBIDOPA AND LEVODOPA 1.5 TABLET(S): 25; 100 TABLET ORAL at 20:04

## 2024-04-10 RX ADMIN — Medication 25 MILLIGRAM(S): at 17:42

## 2024-04-10 RX ADMIN — FLUPHENAZINE HYDROCHLORIDE 2 MILLIGRAM(S): 1 TABLET, FILM COATED ORAL at 17:37

## 2024-04-10 RX ADMIN — FLUPHENAZINE HYDROCHLORIDE 2 MILLIGRAM(S): 1 TABLET, FILM COATED ORAL at 13:12

## 2024-04-10 RX ADMIN — Medication 75 MICROGRAM(S): at 06:24

## 2024-04-10 RX ADMIN — Medication 1 MILLIGRAM(S): at 20:03

## 2024-04-10 RX ADMIN — Medication 25 MILLIGRAM(S): at 13:11

## 2024-04-10 RX ADMIN — METFORMIN HYDROCHLORIDE 500 MILLIGRAM(S): 850 TABLET ORAL at 20:03

## 2024-04-10 RX ADMIN — FLUPHENAZINE HYDROCHLORIDE 2 MILLIGRAM(S): 1 TABLET, FILM COATED ORAL at 09:18

## 2024-04-10 RX ADMIN — SENNA PLUS 2 TABLET(S): 8.6 TABLET ORAL at 20:03

## 2024-04-10 RX ADMIN — Medication 1 TABLET(S): at 20:04

## 2024-04-10 RX ADMIN — DIVALPROEX SODIUM 750 MILLIGRAM(S): 500 TABLET, DELAYED RELEASE ORAL at 20:03

## 2024-04-10 NOTE — BH INPATIENT PSYCHIATRY PROGRESS NOTE - PRN MEDS
MEDICATIONS  (PRN):  acetaminophen     Tablet .. 650 milliGRAM(s) Oral every 6 hours PRN Mild Pain (1 - 3), Moderate Pain (4 - 6)  albuterol    90 MICROgram(s) HFA Inhaler 2 Puff(s) Inhalation every 6 hours PRN Shortness of Breath and/or Wheezing  aluminum hydroxide/magnesium hydroxide/simethicone Suspension 30 milliLiter(s) Oral every 6 hours PRN Dyspepsia  bisacodyl Suppository 10 milliGRAM(s) Rectal daily PRN constipation  dextrose Oral Gel 15 Gram(s) Oral once PRN Blood Glucose LESS THAN 70 milliGRAM(s)/deciliter  fluPHENAZine 1 milliGRAM(s) Oral every 6 hours PRN agitation  fluPHENAZine  Injectable 2 milliGRAM(s) IntraMuscular once PRN agitation  simethicone 80 milliGRAM(s) Chew every 8 hours PRN gas  sodium chloride 0.65% Nasal 2 Spray(s) Both Nostrils every 6 hours PRN congestion

## 2024-04-10 NOTE — BH INPATIENT PSYCHIATRY PROGRESS NOTE - MSE UNSTRUCTURED FT
Appearance: casual grooming, no overt deficits in hygiene;  sig tremor, loose at elbows and wrist. Walks with anteroflexed gait though no shuffling or cogwheeling  Behavior: agitated, uncooperatie verbally abusive  Speech: loud,yelling  Mood: angry  Affect:c/w mood  Thought process: impoverished  Thought content: paranoid accusatory denies SI/HI. Baptism delusions. with less pre-occupation Believes she had a baby from a staff member  Perceptual disturbances: no appearance of internal preoccupation  Cognition: adequately oriented  Insight: limited  Judgement: limited

## 2024-04-10 NOTE — BH INPATIENT PSYCHIATRY PROGRESS NOTE - NSBHFUPINTERVALHXFT_PSY_A_CORE
f/up SAD, care discussed with tx team, refused vitals. Patient more agitated today try to hit staff, needed prn of Prolixin was modest effectively patient escalated again needed Im this time given Prolixin, Benadryl to prevent worse EPS and minimize use of BDZ. Patient eats well no swallowing issues. Sleep fair.

## 2024-04-10 NOTE — BH INPATIENT PSYCHIATRY PROGRESS NOTE - NSBHASSESSSUMMFT_PSY_ALL_CORE
2/5 Patient showing some improving trend will give more time, if needs more medication titration due to ongoing symptoms will increase Seroquel not haldol  2/6 Improving cont meds, offered prn suppository and simethicone  2/7 Slowly improving will cont current meds, eval need for further increase Seroquel  2/8 Partial response continue meds except will increase Seroquel as was on 250mg/d PTA  2/9 Overall gains, still regresses and becomes difficult to manage will increase Seroquel to 200mg/d in divided dosing  2/12 A little calmer since started on additional mid day, cont other meds w/o change  2/13 Patient better still disorganized, hyper Oriental orthodox but less agitated, cont current treatment  2/14 Improved globally with some symptom variability. Cont current med consider further increment Seroquel  2/15 Improved cont current regimen for now  2/16 Showing  some gains continue treatment for now at current doses   2/20 Improved, cont current rx. Scheduled urogyn for pessary replacement  2/21 Improved with variable intrusiveness. Cont meds will increase Miralax and Senna  2/22 Improving cont current rx, observe for effect of re-titration of laxatives  2/23 Lost balance mainly due to behavior however to be safe will lower haldol and Klonopin modestly and observe. Otherwise generally manageable  2/24: No PRNs needed overnight. Compliant with meds. As per staff last night pt was banging her Bible against the wall and lost her balance, staff held her. Pt remains talkative and intrusive at times but overall calmer and redirectable.   2/25: remains labile, loud, easily agitated but more redirectable.  2/26 Patient seen by medicine no evidence injury needing imaging, just Tylenol prn. Will cont current meds, if anteroflexion dosesn't improve with recent drop in haldol will consider further dose reduction  2/27 Has had slow increase in EPS despite no increase in dose and some reduction will hold haldol to reduce EPS and restart at lower dose  2/28 Patient with change in med tolerance some agitation and sedation. Will hold haldol tonight consider changing to Prolixin as may be a little better for EPS and will check labs to look for any medical issue  2/29 Labile irritable, increased tremor improved with washout of haldol. Will restart haldol but see if she can be stabilized at low dose to minimize tremor. COuld consider increasing Seroquel  3/1 Improved will see if she can stabilize on Seroquel and low dose haldol to minimize EPS will address nasal symptoms  3/2 Patient better less bent tremor less calmer. Attempt to find dose of haldol that control psychosis agitation with manageable tremor. May need to try 2mg bid as 1mg big may be subtherapeutic  3/4 Some improvement in that less agitated but EPS better will cont with low dose haldol Issues is that both side effects and decompenstion have had long lags related to dose changes so unclear if at haldol 1mg bid she is better EPS wise but will gradually decompensate. Cont to observe consider increase to 1mg tid. Patient's 2PC expiring she remains delusional disorganized not yet able to managed in SNF will apply for further retention  3/5 Showing some improvement with less agitation despite delusions but also less EPS with lowered haldol. Cont meds will reduce klonopin from 0.25mg tid to bid to reduce polypharm cont other meds. If calmer less disorganized will ask PT if she can walker trial  3/6: behavior in better control, delusional, suspected AH. taking meds.   3/7: can become irritable, redirectable. c/w current meds, ambulating hunched over.   3/8: went to interventional Urology on Fri, UA ordered, ultrasound of bladder scheduled for Monday at 10 AM,  and ob gyn for pessary ordered. If blood in urine—send back to urology office for cysto (please see the full consult in chart)  3/9: Somewhat sedated this AM, calm, not agitated, will follow and adjust meds as necessary (clonazepam)   3/10: More alert today, suspicious of examiner, UA negative  3/11: Mistrustfulness remains, leading to irritability with staff; no prns given   3/12: Labile and irritability noted, poor insight and requires continued inpatient psychiatric hospitalization for safety and stabilization.  3/13: labile, irritable on approach, redirectable. c/w current meds. Delusions noted on exam.  3/14: Labile and irritability noted, poor insight and requires continued inpatient psychiatric hospitalization for safety and stabilization.  3/15: labile, irritable on approach, redirectable. c/w current meds.  3/18 doing poor on regimen where haldol on low dose ineffective or causes sig tremor and anterflexion on higher doses. Will stop and review alternatives such as prolixin or risperidone  3/19 Patient with less sedation , still sig trremor. Delusional irritable, in altercation with roommate sees her room as belonging solely to her. Cont off haldol consider second antipsychotic has been on olanzapine, Prolixin, risperidone, she believes has not been on Abilify will consider this as trial with option for SHAFER  3/20 Psychotic irritable disorganized may go back to trial of olanzapine to allow for monotherapy and less EPS. Spoke with cally will lower Valtrex as dose is above recommended for maintenance  3/21 Louder more intrusive. Will titrate olanzapine ands use prns target 20-30mg, may need second antipsychotic given treatment resistance and clozapine not feasible  3/22 Patient sl calmer with po and prn olanzapine still lous intrusive irritable, needing prns. will increase to 5mg tid. Plan when calmer attempt to simplify regimen to bid to improve compliance.  3/23: no PRN overnight/this am. Noted walking in dayroom, calmer, no aggression now.  3/24: s/p fall, no injury, noted walking in dining area, remains hostile, easily agitated and uncooperative.   3/25 Patient loud irritable  but somewhat less disorganized. Seen by PT not assessed as sig Parkinsonized. will cont meds, will try to check VPA level in AM. Feel due to reduced disorganization patient no longer requires  will place on routine checks , will check labs including VPa level in AM  3/26 Modestly better on olanzpine. Cont trial consider increasing to 10mg bid.  Reorder labs when patient more cooperative  3/27 Modest improvement, cont olanzapine but will now move to bid dosing also Depakote and Sinemet will be made bid to decrease number of times per day she needs to be approached for medication  3/28 Modest gains from olanzapine with less EPS than when on haloperidol. COnt treatment will increase to 10mg bid try to obtain VPA level in AM  3/29 Cont olanzapine trial. Considering other options patient refuses li and likely would be hard to follow with labs. Patient feels Abilify helped in past will consider rechallenge with cross taper  3/30 Poor reponse to Zyprexa will change prn to Ativan /fluphenazine consider then standing prolicxin at low dose as only time patient stable was when she was on haldol but then developed EPS at higher dose  3/31 Remains highly agitated. Only time patient better was combo atypical and conventional will add low dose fluphenazine to olanzapine  4/1 Agitated needing prn. Will d/c olanzapine as has been completely ineffective. Will use Prolixin as she appears to respond to conventional antipsychotics but given EPS vulnerability will add Cogentin as there is no other option to manage EPS  4/2 Loud agitated psychotic no worse off olanzapine will increase fluphenazine to 2mg  bid  4/3 Loud, irritable, Oriental orthodox delusions, very disorganized behavior such as placing self on floors, placing paper crosses on floor. Will increase Prolixin to 2.5mg bid  4/4 Psychotic , agitated disorganized. Tolerating current Prolixin, plan titrate but need to give time on each dose as she tends to have side effects lags.   4/5: irritable, religiously focused, taking medications.   4/8 Modest gains, reduce need for prns since starting prolixin, cont current dose based on level of EPS may increase to 7.5mg/d vs re add Seroquel  4/9 Somewhat better will hold off on adding another med. Will give more time on each dose based that in past she had delayed side effects  4/10 Patient worse today Needing IM will increase Prolixin as no clear indication EPS worse. If not improving with fluphenazine minotherapy consider as above rechallenge with quetiapine add on

## 2024-04-10 NOTE — BH INPATIENT PSYCHIATRY PROGRESS NOTE - CURRENT MEDICATION
MEDICATIONS  (STANDING):  ammonium lactate 12% Lotion 1 Application(s) Topical two times a day  benztropine 1 milliGRAM(s) Oral two times a day  carbidopa/levodopa  25/100 1.5 Tablet(s) Oral <User Schedule>  cholecalciferol 400 Unit(s) Oral daily  divalproex Sprinkle 750 milliGRAM(s) Oral two times a day  fluPHENAZine 2.5 milliGRAM(s) Oral at bedtime  fluPHENAZine 5 milliGRAM(s) Oral daily  glucagon  Injectable 1 milliGRAM(s) IntraMuscular once  hemorrhoidal Ointment 1 Application(s) Rectal daily  hydrocortisone 2.5% Ointment 1 Application(s) Topical two times a day  insulin lispro (ADMELOG) corrective regimen sliding scale   SubCutaneous three times a day before meals  levothyroxine 75 MICROGram(s) Oral daily  lisinopril 20 milliGRAM(s) Oral daily  metFORMIN 500 milliGRAM(s) Oral two times a day  metoprolol succinate ER 50 milliGRAM(s) Oral daily  multivitamin 1 Tablet(s) Oral at bedtime  polyethylene glycol 3350 17 Gram(s) Oral daily  senna 2 Tablet(s) Oral at bedtime  valACYclovir 1000 milliGRAM(s) Oral daily    MEDICATIONS  (PRN):  acetaminophen     Tablet .. 650 milliGRAM(s) Oral every 6 hours PRN Mild Pain (1 - 3), Moderate Pain (4 - 6)  albuterol    90 MICROgram(s) HFA Inhaler 2 Puff(s) Inhalation every 6 hours PRN Shortness of Breath and/or Wheezing  aluminum hydroxide/magnesium hydroxide/simethicone Suspension 30 milliLiter(s) Oral every 6 hours PRN Dyspepsia  bisacodyl Suppository 10 milliGRAM(s) Rectal daily PRN constipation  dextrose Oral Gel 15 Gram(s) Oral once PRN Blood Glucose LESS THAN 70 milliGRAM(s)/deciliter  fluPHENAZine 1 milliGRAM(s) Oral every 6 hours PRN agitation  fluPHENAZine  Injectable 2 milliGRAM(s) IntraMuscular once PRN agitation  simethicone 80 milliGRAM(s) Chew every 8 hours PRN gas  sodium chloride 0.65% Nasal 2 Spray(s) Both Nostrils every 6 hours PRN congestion

## 2024-04-10 NOTE — BH INPATIENT PSYCHIATRY PROGRESS NOTE - NSBHMETABOLIC_PSY_ALL_CORE_FT
BMI: BMI (kg/m2): 30.2 (01-15-24 @ 12:52)  HbA1c: A1C with Estimated Average Glucose Result: 7.6 % (01-12-24 @ 12:40)    Glucose: POCT Blood Glucose.: 211 mg/dL (04-09-24 @ 16:29)    BP: 132/74 (04-09-24 @ 09:42) (132/74 - 132/74)Vital Signs Last 24 Hrs  T(C): --  T(F): --  HR: --  BP: --  BP(mean): --  RR: --  SpO2: --      Lipid Panel:

## 2024-04-11 RX ORDER — QUETIAPINE FUMARATE 200 MG/1
25 TABLET, FILM COATED ORAL
Refills: 0 | Status: DISCONTINUED | OUTPATIENT
Start: 2024-04-11 | End: 2024-04-15

## 2024-04-11 RX ADMIN — DIVALPROEX SODIUM 750 MILLIGRAM(S): 500 TABLET, DELAYED RELEASE ORAL at 09:44

## 2024-04-11 RX ADMIN — FLUPHENAZINE HYDROCHLORIDE 5 MILLIGRAM(S): 1 TABLET, FILM COATED ORAL at 09:45

## 2024-04-11 RX ADMIN — Medication 1 MILLIGRAM(S): at 09:44

## 2024-04-11 RX ADMIN — Medication 400 UNIT(S): at 09:44

## 2024-04-11 RX ADMIN — Medication 75 MICROGRAM(S): at 06:34

## 2024-04-11 RX ADMIN — VALACYCLOVIR 1000 MILLIGRAM(S): 500 TABLET, FILM COATED ORAL at 09:45

## 2024-04-11 RX ADMIN — FLUPHENAZINE HYDROCHLORIDE 2.5 MILLIGRAM(S): 1 TABLET, FILM COATED ORAL at 20:38

## 2024-04-11 RX ADMIN — METFORMIN HYDROCHLORIDE 500 MILLIGRAM(S): 850 TABLET ORAL at 09:45

## 2024-04-12 PROCEDURE — 99232 SBSQ HOSP IP/OBS MODERATE 35: CPT

## 2024-04-12 RX ADMIN — CARBIDOPA AND LEVODOPA 1.5 TABLET(S): 25; 100 TABLET ORAL at 19:49

## 2024-04-12 RX ADMIN — METFORMIN HYDROCHLORIDE 500 MILLIGRAM(S): 850 TABLET ORAL at 12:46

## 2024-04-12 RX ADMIN — DIVALPROEX SODIUM 750 MILLIGRAM(S): 500 TABLET, DELAYED RELEASE ORAL at 19:47

## 2024-04-12 RX ADMIN — Medication 50 MILLIGRAM(S): at 12:46

## 2024-04-12 RX ADMIN — DIVALPROEX SODIUM 750 MILLIGRAM(S): 500 TABLET, DELAYED RELEASE ORAL at 12:45

## 2024-04-12 RX ADMIN — FLUPHENAZINE HYDROCHLORIDE 2.5 MILLIGRAM(S): 1 TABLET, FILM COATED ORAL at 19:48

## 2024-04-12 RX ADMIN — QUETIAPINE FUMARATE 25 MILLIGRAM(S): 200 TABLET, FILM COATED ORAL at 19:49

## 2024-04-12 RX ADMIN — FLUPHENAZINE HYDROCHLORIDE 5 MILLIGRAM(S): 1 TABLET, FILM COATED ORAL at 12:46

## 2024-04-12 RX ADMIN — Medication 75 MICROGRAM(S): at 06:27

## 2024-04-12 RX ADMIN — Medication 1 MILLIGRAM(S): at 12:46

## 2024-04-12 RX ADMIN — SENNA PLUS 2 TABLET(S): 8.6 TABLET ORAL at 19:48

## 2024-04-12 RX ADMIN — METFORMIN HYDROCHLORIDE 500 MILLIGRAM(S): 850 TABLET ORAL at 19:48

## 2024-04-12 RX ADMIN — CARBIDOPA AND LEVODOPA 1.5 TABLET(S): 25; 100 TABLET ORAL at 12:47

## 2024-04-12 RX ADMIN — QUETIAPINE FUMARATE 25 MILLIGRAM(S): 200 TABLET, FILM COATED ORAL at 12:46

## 2024-04-12 RX ADMIN — Medication 1 MILLIGRAM(S): at 19:49

## 2024-04-12 RX ADMIN — Medication 1 TABLET(S): at 19:49

## 2024-04-12 NOTE — BH INPATIENT PSYCHIATRY PROGRESS NOTE - NSBHFUPINTERVALHXFT_PSY_A_CORE
Patient seen for follow up of psychosis with aggression, chart reviewed, and case discussed with treatment team.  Patient needed prn again last night for agitation.  This morning, patient remains disorganized, tangential, not answering the questions asked, with paranoid and some Amish themes.  The patient has been intermittently refusing medications.  She has been visible on the unit, walking the halls.  The patient has been eating and sleeping well.  She has been tolerating medications well.

## 2024-04-12 NOTE — BH INPATIENT PSYCHIATRY PROGRESS NOTE - PRN MEDS
MEDICATIONS  (PRN):  acetaminophen     Tablet .. 650 milliGRAM(s) Oral every 6 hours PRN Mild Pain (1 - 3), Moderate Pain (4 - 6)  albuterol    90 MICROgram(s) HFA Inhaler 2 Puff(s) Inhalation every 6 hours PRN Shortness of Breath and/or Wheezing  aluminum hydroxide/magnesium hydroxide/simethicone Suspension 30 milliLiter(s) Oral every 6 hours PRN Dyspepsia  bisacodyl Suppository 10 milliGRAM(s) Rectal daily PRN constipation  dextrose Oral Gel 15 Gram(s) Oral once PRN Blood Glucose LESS THAN 70 milliGRAM(s)/deciliter  diphenhydrAMINE Injectable 25 milliGRAM(s) IntraMuscular once PRN agitation  fluPHENAZine 2.5 milliGRAM(s) Oral every 6 hours PRN agitation  fluPHENAZine  Injectable 2 milliGRAM(s) IntraMuscular once PRN agitation  simethicone 80 milliGRAM(s) Chew every 8 hours PRN gas  sodium chloride 0.65% Nasal 2 Spray(s) Both Nostrils every 6 hours PRN congestion

## 2024-04-12 NOTE — BH INPATIENT PSYCHIATRY PROGRESS NOTE - MSE UNSTRUCTURED FT
Appearance: casual grooming, no overt deficits in hygiene;  sig tremor, loose at elbows and wrist. Walks with anteroflexed gait though no shuffling or cogwheeling  Behavior: agitated at times, makes attempts to be cooperative with interview  Speech: fluent, normal tone, rate, soft in volume  Mood: angry  Affect:c/w mood, irritable  Thought process: disorganized, tangential, impoverished  Thought content: paranoid accusatory denies SI/HI. Pentecostal delusions.  Perceptual disturbances: no appearance of internal preoccupation  Cognition: adequately oriented  Insight: limited  Judgement: limited
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
## 2024-04-12 NOTE — BH INPATIENT PSYCHIATRY PROGRESS NOTE - NSBHMETABOLIC_PSY_ALL_CORE_FT
BMI: BMI (kg/m2): 30.2 (01-15-24 @ 12:52)  HbA1c: A1C with Estimated Average Glucose Result: 7.6 % (01-12-24 @ 12:40)    Glucose: POCT Blood Glucose.: 211 mg/dL (04-09-24 @ 16:29)    BP: --Vital Signs Last 24 Hrs  T(C): --  T(F): --  HR: --  BP: --  BP(mean): --  RR: --  SpO2: --      Lipid Panel:

## 2024-04-12 NOTE — BH INPATIENT PSYCHIATRY PROGRESS NOTE - CURRENT MEDICATION
MEDICATIONS  (STANDING):  ammonium lactate 12% Lotion 1 Application(s) Topical two times a day  benztropine 1 milliGRAM(s) Oral two times a day  carbidopa/levodopa  25/100 1.5 Tablet(s) Oral <User Schedule>  cholecalciferol 400 Unit(s) Oral daily  divalproex Sprinkle 750 milliGRAM(s) Oral two times a day  fluPHENAZine 2.5 milliGRAM(s) Oral at bedtime  fluPHENAZine 5 milliGRAM(s) Oral daily  glucagon  Injectable 1 milliGRAM(s) IntraMuscular once  hemorrhoidal Ointment 1 Application(s) Rectal daily  hydrocortisone 2.5% Ointment 1 Application(s) Topical two times a day  insulin lispro (ADMELOG) corrective regimen sliding scale   SubCutaneous three times a day before meals  levothyroxine 75 MICROGram(s) Oral daily  lisinopril 20 milliGRAM(s) Oral daily  metFORMIN 500 milliGRAM(s) Oral two times a day  metoprolol succinate ER 50 milliGRAM(s) Oral daily  multivitamin 1 Tablet(s) Oral at bedtime  polyethylene glycol 3350 17 Gram(s) Oral daily  QUEtiapine 25 milliGRAM(s) Oral two times a day  senna 2 Tablet(s) Oral at bedtime    MEDICATIONS  (PRN):  acetaminophen     Tablet .. 650 milliGRAM(s) Oral every 6 hours PRN Mild Pain (1 - 3), Moderate Pain (4 - 6)  albuterol    90 MICROgram(s) HFA Inhaler 2 Puff(s) Inhalation every 6 hours PRN Shortness of Breath and/or Wheezing  aluminum hydroxide/magnesium hydroxide/simethicone Suspension 30 milliLiter(s) Oral every 6 hours PRN Dyspepsia  bisacodyl Suppository 10 milliGRAM(s) Rectal daily PRN constipation  dextrose Oral Gel 15 Gram(s) Oral once PRN Blood Glucose LESS THAN 70 milliGRAM(s)/deciliter  diphenhydrAMINE Injectable 25 milliGRAM(s) IntraMuscular once PRN agitation  fluPHENAZine 2.5 milliGRAM(s) Oral every 6 hours PRN agitation  fluPHENAZine  Injectable 2 milliGRAM(s) IntraMuscular once PRN agitation  simethicone 80 milliGRAM(s) Chew every 8 hours PRN gas  sodium chloride 0.65% Nasal 2 Spray(s) Both Nostrils every 6 hours PRN congestion

## 2024-04-13 LAB
GLUCOSE BLDC GLUCOMTR-MCNC: 128 MG/DL — HIGH (ref 70–99)
GLUCOSE BLDC GLUCOMTR-MCNC: 218 MG/DL — HIGH (ref 70–99)

## 2024-04-13 RX ADMIN — Medication 1 MILLIGRAM(S): at 08:29

## 2024-04-13 RX ADMIN — DIVALPROEX SODIUM 750 MILLIGRAM(S): 500 TABLET, DELAYED RELEASE ORAL at 08:28

## 2024-04-13 RX ADMIN — LISINOPRIL 20 MILLIGRAM(S): 2.5 TABLET ORAL at 08:30

## 2024-04-13 RX ADMIN — FLUPHENAZINE HYDROCHLORIDE 5 MILLIGRAM(S): 1 TABLET, FILM COATED ORAL at 08:30

## 2024-04-13 RX ADMIN — POLYETHYLENE GLYCOL 3350 17 GRAM(S): 17 POWDER, FOR SOLUTION ORAL at 08:27

## 2024-04-13 RX ADMIN — Medication 50 MILLIGRAM(S): at 08:29

## 2024-04-13 RX ADMIN — FLUPHENAZINE HYDROCHLORIDE 2.5 MILLIGRAM(S): 1 TABLET, FILM COATED ORAL at 17:10

## 2024-04-13 RX ADMIN — Medication 400 UNIT(S): at 08:28

## 2024-04-13 RX ADMIN — CARBIDOPA AND LEVODOPA 1.5 TABLET(S): 25; 100 TABLET ORAL at 08:27

## 2024-04-13 RX ADMIN — QUETIAPINE FUMARATE 25 MILLIGRAM(S): 200 TABLET, FILM COATED ORAL at 08:29

## 2024-04-13 RX ADMIN — METFORMIN HYDROCHLORIDE 500 MILLIGRAM(S): 850 TABLET ORAL at 08:29

## 2024-04-13 NOTE — CHART NOTE - NSCHARTNOTEFT_GEN_A_CORE
Nvvv Notified by RN that patient with complaint of chest pain. Patient seen in day room and assessed. Patient is a poor historian with difficulty answering direct questions; however, able to answer yes or no questions. Patient states "pain is here only" pointing to the midsternal chest. Patient denies any radiating pain, blurry vision, lightheadedness, headache, N/V, SOB. Patient noted to be lethargic, per staff "patient is at her baseline" VS: /84, HR 87, RR 18, Temp. 98.2F. Patient has PRN order for Maalox and RN instructed to administer. ECG ordered.    Writer later notified by RN that patient was mistakenly given medications ment for peer on unit and SAUL called. Per staff, peer's medications given to patient were Namenda 10mg, Seroquel 50mg, and Aricept 10mg. Pt seen and did not want to talk again.  SAUL consulted, and patient will be monitored. Patient's hs Seroquel hs held by SAUL. Pt refused her other medications. Notified by RN that patient with complaint of chest pain. Patient seen in day room and assessed. Patient is a poor historian with difficulty answering direct questions; however, able to answer yes or no questions. Patient states "pain is here only" pointing to the midsternal chest. Patient denies any radiating pain, blurry vision, lightheadedness, headache, N/V, SOB. Patient noted to be lethargic, per staff "patient is at her baseline" VS: /84, HR 87, RR 18, Temp. 98.2F. Patient has PRN order for Maalox and RN instructed to administer. ECG ordered.    Writer later notified by RN that patient was mistakenly given medications ment for peer on unit and SAUL called. Per staff, peer's medications given to patient were Namenda 10mg, Seroquel 50mg, and Aricept 10mg. Pt seen and did not want to talk again.  SAUL consulted, and patient will be monitored. Patient's Seroquel hs held by SAUL. Pt refused her other medications.

## 2024-04-13 NOTE — BH CHART NOTE - NSEVENTNOTEFT_PSY_ALL_CORE
Patient evaluated in common space after being mistakenly given medications from peer on unit. Per staff, peer's medications provided to patient were seroquel 50mg, aricept 10mg, and namenda 10mg. VSS. Patient irritable and minimally engaged on interview. Attempts made to explain mistake to patient. Patient's seroquel hs held, but other qhs medications offered to patient, however patient refused.

## 2024-04-14 LAB
GLUCOSE BLDC GLUCOMTR-MCNC: 129 MG/DL — HIGH (ref 70–99)
GLUCOSE BLDC GLUCOMTR-MCNC: 133 MG/DL — HIGH (ref 70–99)

## 2024-04-14 PROCEDURE — 93010 ELECTROCARDIOGRAM REPORT: CPT

## 2024-04-14 RX ADMIN — DIVALPROEX SODIUM 750 MILLIGRAM(S): 500 TABLET, DELAYED RELEASE ORAL at 09:37

## 2024-04-14 RX ADMIN — QUETIAPINE FUMARATE 25 MILLIGRAM(S): 200 TABLET, FILM COATED ORAL at 09:39

## 2024-04-14 RX ADMIN — QUETIAPINE FUMARATE 25 MILLIGRAM(S): 200 TABLET, FILM COATED ORAL at 21:54

## 2024-04-14 RX ADMIN — POLYETHYLENE GLYCOL 3350 17 GRAM(S): 17 POWDER, FOR SOLUTION ORAL at 09:37

## 2024-04-14 RX ADMIN — Medication 50 MILLIGRAM(S): at 09:38

## 2024-04-14 RX ADMIN — FLUPHENAZINE HYDROCHLORIDE 5 MILLIGRAM(S): 1 TABLET, FILM COATED ORAL at 09:39

## 2024-04-14 RX ADMIN — Medication 400 UNIT(S): at 09:38

## 2024-04-14 RX ADMIN — FLUPHENAZINE HYDROCHLORIDE 2.5 MILLIGRAM(S): 1 TABLET, FILM COATED ORAL at 21:54

## 2024-04-14 RX ADMIN — CARBIDOPA AND LEVODOPA 1.5 TABLET(S): 25; 100 TABLET ORAL at 09:42

## 2024-04-14 RX ADMIN — LISINOPRIL 20 MILLIGRAM(S): 2.5 TABLET ORAL at 09:39

## 2024-04-14 RX ADMIN — Medication 1 MILLIGRAM(S): at 09:39

## 2024-04-14 RX ADMIN — Medication 75 MICROGRAM(S): at 07:45

## 2024-04-14 RX ADMIN — METFORMIN HYDROCHLORIDE 500 MILLIGRAM(S): 850 TABLET ORAL at 09:40

## 2024-04-14 RX ADMIN — FLUPHENAZINE HYDROCHLORIDE 2.5 MILLIGRAM(S): 1 TABLET, FILM COATED ORAL at 21:22

## 2024-04-15 PROCEDURE — 99232 SBSQ HOSP IP/OBS MODERATE 35: CPT

## 2024-04-15 RX ORDER — QUETIAPINE FUMARATE 200 MG/1
50 TABLET, FILM COATED ORAL
Refills: 0 | Status: DISCONTINUED | OUTPATIENT
Start: 2024-04-15 | End: 2024-04-18

## 2024-04-15 RX ADMIN — Medication 1 MILLIGRAM(S): at 08:23

## 2024-04-15 RX ADMIN — Medication 50 MILLIGRAM(S): at 08:25

## 2024-04-15 RX ADMIN — METFORMIN HYDROCHLORIDE 500 MILLIGRAM(S): 850 TABLET ORAL at 21:18

## 2024-04-15 RX ADMIN — Medication 1 TABLET(S): at 21:17

## 2024-04-15 RX ADMIN — METFORMIN HYDROCHLORIDE 500 MILLIGRAM(S): 850 TABLET ORAL at 08:22

## 2024-04-15 RX ADMIN — DIVALPROEX SODIUM 750 MILLIGRAM(S): 500 TABLET, DELAYED RELEASE ORAL at 08:22

## 2024-04-15 RX ADMIN — FLUPHENAZINE HYDROCHLORIDE 2.5 MILLIGRAM(S): 1 TABLET, FILM COATED ORAL at 08:22

## 2024-04-15 RX ADMIN — CARBIDOPA AND LEVODOPA 1.5 TABLET(S): 25; 100 TABLET ORAL at 21:19

## 2024-04-15 RX ADMIN — QUETIAPINE FUMARATE 50 MILLIGRAM(S): 200 TABLET, FILM COATED ORAL at 21:17

## 2024-04-15 RX ADMIN — FLUPHENAZINE HYDROCHLORIDE 2.5 MILLIGRAM(S): 1 TABLET, FILM COATED ORAL at 21:18

## 2024-04-15 RX ADMIN — SENNA PLUS 2 TABLET(S): 8.6 TABLET ORAL at 21:18

## 2024-04-15 RX ADMIN — CARBIDOPA AND LEVODOPA 1.5 TABLET(S): 25; 100 TABLET ORAL at 08:24

## 2024-04-15 RX ADMIN — Medication 400 UNIT(S): at 08:25

## 2024-04-15 RX ADMIN — DIVALPROEX SODIUM 750 MILLIGRAM(S): 500 TABLET, DELAYED RELEASE ORAL at 21:17

## 2024-04-15 RX ADMIN — FLUPHENAZINE HYDROCHLORIDE 2.5 MILLIGRAM(S): 1 TABLET, FILM COATED ORAL at 14:27

## 2024-04-15 RX ADMIN — QUETIAPINE FUMARATE 25 MILLIGRAM(S): 200 TABLET, FILM COATED ORAL at 08:23

## 2024-04-15 RX ADMIN — Medication 1 MILLIGRAM(S): at 21:18

## 2024-04-15 RX ADMIN — FLUPHENAZINE HYDROCHLORIDE 5 MILLIGRAM(S): 1 TABLET, FILM COATED ORAL at 08:25

## 2024-04-15 RX ADMIN — Medication 75 MICROGRAM(S): at 06:30

## 2024-04-15 NOTE — BH INPATIENT PSYCHIATRY PROGRESS NOTE - NSBHCHARTREVIEWVS_PSY_A_CORE FT
Vital Signs Last 24 Hrs  T(C): --  T(F): --  HR: --  BP: --  BP(mean): --  RR: --  SpO2: --    Orthostatic VS  04-14-24 @ 09:15  Lying BP: --/-- HR: --  Sitting BP: 143/75 HR: 92  Standing BP: --/-- HR: --  Site: --  Mode: --

## 2024-04-15 NOTE — BH INPATIENT PSYCHIATRY PROGRESS NOTE - CURRENT MEDICATION
MEDICATIONS  (STANDING):  ammonium lactate 12% Lotion 1 Application(s) Topical two times a day  benztropine 1 milliGRAM(s) Oral two times a day  carbidopa/levodopa  25/100 1.5 Tablet(s) Oral <User Schedule>  cholecalciferol 400 Unit(s) Oral daily  divalproex Sprinkle 750 milliGRAM(s) Oral two times a day  fluPHENAZine 5 milliGRAM(s) Oral daily  fluPHENAZine 2.5 milliGRAM(s) Oral at bedtime  glucagon  Injectable 1 milliGRAM(s) IntraMuscular once  hemorrhoidal Ointment 1 Application(s) Rectal daily  hydrocortisone 2.5% Ointment 1 Application(s) Topical two times a day  insulin lispro (ADMELOG) corrective regimen sliding scale   SubCutaneous three times a day before meals  levothyroxine 75 MICROGram(s) Oral daily  lisinopril 20 milliGRAM(s) Oral daily  metFORMIN 500 milliGRAM(s) Oral two times a day  metoprolol succinate ER 50 milliGRAM(s) Oral daily  multivitamin 1 Tablet(s) Oral at bedtime  polyethylene glycol 3350 17 Gram(s) Oral daily  QUEtiapine 50 milliGRAM(s) Oral two times a day  senna 2 Tablet(s) Oral at bedtime    MEDICATIONS  (PRN):  acetaminophen     Tablet .. 650 milliGRAM(s) Oral every 6 hours PRN Mild Pain (1 - 3), Moderate Pain (4 - 6)  albuterol    90 MICROgram(s) HFA Inhaler 2 Puff(s) Inhalation every 6 hours PRN Shortness of Breath and/or Wheezing  aluminum hydroxide/magnesium hydroxide/simethicone Suspension 30 milliLiter(s) Oral every 6 hours PRN Dyspepsia  bisacodyl Suppository 10 milliGRAM(s) Rectal daily PRN constipation  dextrose Oral Gel 15 Gram(s) Oral once PRN Blood Glucose LESS THAN 70 milliGRAM(s)/deciliter  diphenhydrAMINE Injectable 25 milliGRAM(s) IntraMuscular once PRN agitation  fluPHENAZine 2.5 milliGRAM(s) Oral every 6 hours PRN agitation  fluPHENAZine  Injectable 2 milliGRAM(s) IntraMuscular once PRN agitation  simethicone 80 milliGRAM(s) Chew every 8 hours PRN gas  sodium chloride 0.65% Nasal 2 Spray(s) Both Nostrils every 6 hours PRN congestion

## 2024-04-15 NOTE — BH INPATIENT PSYCHIATRY PROGRESS NOTE - NSBHASSESSSUMMFT_PSY_ALL_CORE
2/5 Patient showing some improving trend will give more time, if needs more medication titration due to ongoing symptoms will increase Seroquel not haldol  2/6 Improving cont meds, offered prn suppository and simethicone  2/7 Slowly improving will cont current meds, eval need for further increase Seroquel  2/8 Partial response continue meds except will increase Seroquel as was on 250mg/d PTA  2/9 Overall gains, still regresses and becomes difficult to manage will increase Seroquel to 200mg/d in divided dosing  2/12 A little calmer since started on additional mid day, cont other meds w/o change  2/13 Patient better still disorganized, hyper Temple but less agitated, cont current treatment  2/14 Improved globally with some symptom variability. Cont current med consider further increment Seroquel  2/15 Improved cont current regimen for now  2/16 Showing  some gains continue treatment for now at current doses   2/20 Improved, cont current rx. Scheduled urogyn for pessary replacement  2/21 Improved with variable intrusiveness. Cont meds will increase Miralax and Senna  2/22 Improving cont current rx, observe for effect of re-titration of laxatives  2/23 Lost balance mainly due to behavior however to be safe will lower haldol and Klonopin modestly and observe. Otherwise generally manageable  2/24: No PRNs needed overnight. Compliant with meds. As per staff last night pt was banging her Bible against the wall and lost her balance, staff held her. Pt remains talkative and intrusive at times but overall calmer and redirectable.   2/25: remains labile, loud, easily agitated but more redirectable.  2/26 Patient seen by medicine no evidence injury needing imaging, just Tylenol prn. Will cont current meds, if anteroflexion dosesn't improve with recent drop in haldol will consider further dose reduction  2/27 Has had slow increase in EPS despite no increase in dose and some reduction will hold haldol to reduce EPS and restart at lower dose  2/28 Patient with change in med tolerance some agitation and sedation. Will hold haldol tonight consider changing to Prolixin as may be a little better for EPS and will check labs to look for any medical issue  2/29 Labile irritable, increased tremor improved with washout of haldol. Will restart haldol but see if she can be stabilized at low dose to minimize tremor. COuld consider increasing Seroquel  3/1 Improved will see if she can stabilize on Seroquel and low dose haldol to minimize EPS will address nasal symptoms  3/2 Patient better less bent tremor less calmer. Attempt to find dose of haldol that control psychosis agitation with manageable tremor. May need to try 2mg bid as 1mg big may be subtherapeutic  3/4 Some improvement in that less agitated but EPS better will cont with low dose haldol Issues is that both side effects and decompenstion have had long lags related to dose changes so unclear if at haldol 1mg bid she is better EPS wise but will gradually decompensate. Cont to observe consider increase to 1mg tid. Patient's 2PC expiring she remains delusional disorganized not yet able to managed in SNF will apply for further retention  3/5 Showing some improvement with less agitation despite delusions but also less EPS with lowered haldol. Cont meds will reduce klonopin from 0.25mg tid to bid to reduce polypharm cont other meds. If calmer less disorganized will ask PT if she can walker trial  3/6: behavior in better control, delusional, suspected AH. taking meds.   3/7: can become irritable, redirectable. c/w current meds, ambulating hunched over.   3/8: went to interventional Urology on Fri, UA ordered, ultrasound of bladder scheduled for Monday at 10 AM,  and ob gyn for pessary ordered. If blood in urine—send back to urology office for cysto (please see the full consult in chart)  3/9: Somewhat sedated this AM, calm, not agitated, will follow and adjust meds as necessary (clonazepam)   3/10: More alert today, suspicious of examiner, UA negative  3/11: Mistrustfulness remains, leading to irritability with staff; no prns given   3/12: Labile and irritability noted, poor insight and requires continued inpatient psychiatric hospitalization for safety and stabilization.  3/13: labile, irritable on approach, redirectable. c/w current meds. Delusions noted on exam.  3/14: Labile and irritability noted, poor insight and requires continued inpatient psychiatric hospitalization for safety and stabilization.  3/15: labile, irritable on approach, redirectable. c/w current meds.  3/18 doing poor on regimen where haldol on low dose ineffective or causes sig tremor and anterflexion on higher doses. Will stop and review alternatives such as prolixin or risperidone  3/19 Patient with less sedation , still sig trremor. Delusional irritable, in altercation with roommate sees her room as belonging solely to her. Cont off haldol consider second antipsychotic has been on olanzapine, Prolixin, risperidone, she believes has not been on Abilify will consider this as trial with option for SHAFER  3/20 Psychotic irritable disorganized may go back to trial of olanzapine to allow for monotherapy and less EPS. Spoke with cally will lower Valtrex as dose is above recommended for maintenance  3/21 Louder more intrusive. Will titrate olanzapine ands use prns target 20-30mg, may need second antipsychotic given treatment resistance and clozapine not feasible  3/22 Patient sl calmer with po and prn olanzapine still lous intrusive irritable, needing prns. will increase to 5mg tid. Plan when calmer attempt to simplify regimen to bid to improve compliance.  3/23: no PRN overnight/this am. Noted walking in dayroom, calmer, no aggression now.  3/24: s/p fall, no injury, noted walking in dining area, remains hostile, easily agitated and uncooperative.   3/25 Patient loud irritable  but somewhat less disorganized. Seen by PT not assessed as sig Parkinsonized. will cont meds, will try to check VPA level in AM. Feel due to reduced disorganization patient no longer requires  will place on routine checks , will check labs including VPa level in AM  3/26 Modestly better on olanzpine. Cont trial consider increasing to 10mg bid.  Reorder labs when patient more cooperative  3/27 Modest improvement, cont olanzapine but will now move to bid dosing also Depakote and Sinemet will be made bid to decrease number of times per day she needs to be approached for medication  3/28 Modest gains from olanzapine with less EPS than when on haloperidol. COnt treatment will increase to 10mg bid try to obtain VPA level in AM  3/29 Cont olanzapine trial. Considering other options patient refuses li and likely would be hard to follow with labs. Patient feels Abilify helped in past will consider rechallenge with cross taper  3/30 Poor reponse to Zyprexa will change prn to Ativan /fluphenazine consider then standing prolicxin at low dose as only time patient stable was when she was on haldol but then developed EPS at higher dose  3/31 Remains highly agitated. Only time patient better was combo atypical and conventional will add low dose fluphenazine to olanzapine  4/1 Agitated needing prn. Will d/c olanzapine as has been completely ineffective. Will use Prolixin as she appears to respond to conventional antipsychotics but given EPS vulnerability will add Cogentin as there is no other option to manage EPS  4/2 Loud agitated psychotic no worse off olanzapine will increase fluphenazine to 2mg  bid  4/3 Loud, irritable, Temple delusions, very disorganized behavior such as placing self on floors, placing paper crosses on floor. Will increase Prolixin to 2.5mg bid  4/4 Psychotic , agitated disorganized. Tolerating current Prolixin, plan titrate but need to give time on each dose as she tends to have side effects lags.   4/5: irritable, religiously focused, taking medications.   4/8 Modest gains, reduce need for prns since starting prolixin, cont current dose based on level of EPS may increase to 7.5mg/d vs re add Seroquel  4/9 Somewhat better will hold off on adding another med. Will give more time on each dose based that in past she had delayed side effects  4/10 Patient worse today Needing IM will increase Prolixin as no clear indication EPS worse. If not improving with fluphenazine monotherapy consider as above rechallenge with quetiapine add on  4/12: The patient continues to be psychotic, intermittently compliant with medications.  Intermittent agitation, needing prn's.  Continue Prolixin and Seroquel trial  4/15: The patient continues to be psychotic, more compliant with medications overall.  Intermittent agitation continues.  Will increase Seroquel to 50mg bid.  Continue prolixin.

## 2024-04-15 NOTE — BH INPATIENT PSYCHIATRY PROGRESS NOTE - NSBHMETABOLIC_PSY_ALL_CORE_FT
BMI: BMI (kg/m2): 30.2 (01-15-24 @ 12:52)  HbA1c: A1C with Estimated Average Glucose Result: 7.6 % (01-12-24 @ 12:40)    Glucose: POCT Blood Glucose.: 133 mg/dL (04-14-24 @ 20:09)    BP: 137/84 (04-13-24 @ 21:04) (137/84 - 137/84)Vital Signs Last 24 Hrs  T(C): --  T(F): --  HR: --  BP: --  BP(mean): --  RR: --  SpO2: --    Orthostatic VS  04-14-24 @ 09:15  Lying BP: --/-- HR: --  Sitting BP: 143/75 HR: 92  Standing BP: --/-- HR: --  Site: --  Mode: --    Lipid Panel:

## 2024-04-15 NOTE — BH INPATIENT PSYCHIATRY PROGRESS NOTE - NSBHFUPINTERVALHXFT_PSY_A_CORE
Patient seen for follow up of psychosis with aggression, chart reviewed, and case discussed with treatment team.  Over the weekend, the patient has continues to require prn for agitation.  This morning, patient remains disorganized, tangential, not answering the questions asked.  She has paranoid and some Jain themes, also worried about her children being in the war in [place].  The patient has been more consistent with medications overall, tolerating them well.  She has been visible on the unit, walking the halls.  The patient has been eating and sleeping well.

## 2024-04-15 NOTE — BH INPATIENT PSYCHIATRY PROGRESS NOTE - MSE UNSTRUCTURED FT
Appearance: casual grooming, no overt deficits in hygiene;  sig tremor, loose at elbows and wrist. Walks with anteroflexed gait though no shuffling or cogwheeling  Behavior: agitated at times, makes attempts to be cooperative with interview  Speech: fluent, normal tone, rate, loud at times in volume  Mood: angry  Affect:c/w mood, irritable  Thought process: disorganized, tangential, loose  Thought content: paranoid accusatory denies SI/HI. Jewish delusions.  Perceptual disturbances: no appearance of internal preoccupation  Cognition: adequately oriented  Insight: limited  Judgement: limited

## 2024-04-16 LAB — GLUCOSE BLDC GLUCOMTR-MCNC: 93 MG/DL — SIGNIFICANT CHANGE UP (ref 70–99)

## 2024-04-16 PROCEDURE — 99232 SBSQ HOSP IP/OBS MODERATE 35: CPT

## 2024-04-16 RX ORDER — FLUPHENAZINE HYDROCHLORIDE 1 MG/1
2 TABLET, FILM COATED ORAL ONCE
Refills: 0 | Status: COMPLETED | OUTPATIENT
Start: 2024-04-16 | End: 2024-04-16

## 2024-04-16 RX ADMIN — CARBIDOPA AND LEVODOPA 1.5 TABLET(S): 25; 100 TABLET ORAL at 08:54

## 2024-04-16 RX ADMIN — DIVALPROEX SODIUM 750 MILLIGRAM(S): 500 TABLET, DELAYED RELEASE ORAL at 20:31

## 2024-04-16 RX ADMIN — POLYETHYLENE GLYCOL 3350 17 GRAM(S): 17 POWDER, FOR SOLUTION ORAL at 08:49

## 2024-04-16 RX ADMIN — Medication 1 TABLET(S): at 20:30

## 2024-04-16 RX ADMIN — FLUPHENAZINE HYDROCHLORIDE 2 MILLIGRAM(S): 1 TABLET, FILM COATED ORAL at 14:02

## 2024-04-16 RX ADMIN — FLUPHENAZINE HYDROCHLORIDE 2.5 MILLIGRAM(S): 1 TABLET, FILM COATED ORAL at 20:30

## 2024-04-16 RX ADMIN — CARBIDOPA AND LEVODOPA 1.5 TABLET(S): 25; 100 TABLET ORAL at 20:30

## 2024-04-16 RX ADMIN — Medication 1 MILLIGRAM(S): at 08:50

## 2024-04-16 RX ADMIN — QUETIAPINE FUMARATE 50 MILLIGRAM(S): 200 TABLET, FILM COATED ORAL at 08:49

## 2024-04-16 RX ADMIN — FLUPHENAZINE HYDROCHLORIDE 5 MILLIGRAM(S): 1 TABLET, FILM COATED ORAL at 08:50

## 2024-04-16 RX ADMIN — SENNA PLUS 2 TABLET(S): 8.6 TABLET ORAL at 20:30

## 2024-04-16 RX ADMIN — Medication 75 MICROGRAM(S): at 06:39

## 2024-04-16 RX ADMIN — Medication 1 MILLIGRAM(S): at 20:30

## 2024-04-16 RX ADMIN — DIVALPROEX SODIUM 750 MILLIGRAM(S): 500 TABLET, DELAYED RELEASE ORAL at 08:49

## 2024-04-16 RX ADMIN — Medication 400 UNIT(S): at 08:50

## 2024-04-16 RX ADMIN — LISINOPRIL 20 MILLIGRAM(S): 2.5 TABLET ORAL at 08:49

## 2024-04-16 RX ADMIN — METFORMIN HYDROCHLORIDE 500 MILLIGRAM(S): 850 TABLET ORAL at 20:30

## 2024-04-16 RX ADMIN — METFORMIN HYDROCHLORIDE 500 MILLIGRAM(S): 850 TABLET ORAL at 08:49

## 2024-04-16 RX ADMIN — QUETIAPINE FUMARATE 50 MILLIGRAM(S): 200 TABLET, FILM COATED ORAL at 20:30

## 2024-04-16 RX ADMIN — Medication 50 MILLIGRAM(S): at 08:49

## 2024-04-16 NOTE — BH INPATIENT PSYCHIATRY PROGRESS NOTE - NSBHCHARTREVIEWVS_PSY_A_CORE FT
Vital Signs Last 24 Hrs  T(C): --  T(F): --  HR: 96 (04-16-24 @ 08:40) (96 - 96)  BP: 144/88 (04-16-24 @ 08:40) (144/88 - 144/88)  BP(mean): --  RR: --  SpO2: --

## 2024-04-16 NOTE — BH INPATIENT PSYCHIATRY PROGRESS NOTE - CURRENT MEDICATION
MEDICATIONS  (STANDING):  ammonium lactate 12% Lotion 1 Application(s) Topical two times a day  benztropine 1 milliGRAM(s) Oral two times a day  carbidopa/levodopa  25/100 1.5 Tablet(s) Oral <User Schedule>  cholecalciferol 400 Unit(s) Oral daily  divalproex Sprinkle 750 milliGRAM(s) Oral two times a day  fluPHENAZine 2.5 milliGRAM(s) Oral at bedtime  fluPHENAZine 5 milliGRAM(s) Oral daily  glucagon  Injectable 1 milliGRAM(s) IntraMuscular once  hemorrhoidal Ointment 1 Application(s) Rectal daily  hydrocortisone 2.5% Ointment 1 Application(s) Topical two times a day  insulin lispro (ADMELOG) corrective regimen sliding scale   SubCutaneous three times a day before meals  levothyroxine 75 MICROGram(s) Oral daily  lisinopril 20 milliGRAM(s) Oral daily  metFORMIN 500 milliGRAM(s) Oral two times a day  metoprolol succinate ER 50 milliGRAM(s) Oral daily  multivitamin 1 Tablet(s) Oral at bedtime  polyethylene glycol 3350 17 Gram(s) Oral daily  QUEtiapine 50 milliGRAM(s) Oral two times a day  senna 2 Tablet(s) Oral at bedtime    MEDICATIONS  (PRN):  acetaminophen     Tablet .. 650 milliGRAM(s) Oral every 6 hours PRN Mild Pain (1 - 3), Moderate Pain (4 - 6)  albuterol    90 MICROgram(s) HFA Inhaler 2 Puff(s) Inhalation every 6 hours PRN Shortness of Breath and/or Wheezing  aluminum hydroxide/magnesium hydroxide/simethicone Suspension 30 milliLiter(s) Oral every 6 hours PRN Dyspepsia  bisacodyl Suppository 10 milliGRAM(s) Rectal daily PRN constipation  dextrose Oral Gel 15 Gram(s) Oral once PRN Blood Glucose LESS THAN 70 milliGRAM(s)/deciliter  diphenhydrAMINE Injectable 25 milliGRAM(s) IntraMuscular once PRN agitation  fluPHENAZine 2.5 milliGRAM(s) Oral every 6 hours PRN agitation  fluPHENAZine  Injectable 2 milliGRAM(s) IntraMuscular once PRN agitation  simethicone 80 milliGRAM(s) Chew every 8 hours PRN gas  sodium chloride 0.65% Nasal 2 Spray(s) Both Nostrils every 6 hours PRN congestion

## 2024-04-16 NOTE — BH INPATIENT PSYCHIATRY PROGRESS NOTE - NSBHASSESSSUMMFT_PSY_ALL_CORE
2/5 Patient showing some improving trend will give more time, if needs more medication titration due to ongoing symptoms will increase Seroquel not haldol  2/6 Improving cont meds, offered prn suppository and simethicone  2/7 Slowly improving will cont current meds, eval need for further increase Seroquel  2/8 Partial response continue meds except will increase Seroquel as was on 250mg/d PTA  2/9 Overall gains, still regresses and becomes difficult to manage will increase Seroquel to 200mg/d in divided dosing  2/12 A little calmer since started on additional mid day, cont other meds w/o change  2/13 Patient better still disorganized, hyper Orthodoxy but less agitated, cont current treatment  2/14 Improved globally with some symptom variability. Cont current med consider further increment Seroquel  2/15 Improved cont current regimen for now  2/16 Showing  some gains continue treatment for now at current doses   2/20 Improved, cont current rx. Scheduled urogyn for pessary replacement  2/21 Improved with variable intrusiveness. Cont meds will increase Miralax and Senna  2/22 Improving cont current rx, observe for effect of re-titration of laxatives  2/23 Lost balance mainly due to behavior however to be safe will lower haldol and Klonopin modestly and observe. Otherwise generally manageable  2/24: No PRNs needed overnight. Compliant with meds. As per staff last night pt was banging her Bible against the wall and lost her balance, staff held her. Pt remains talkative and intrusive at times but overall calmer and redirectable.   2/25: remains labile, loud, easily agitated but more redirectable.  2/26 Patient seen by medicine no evidence injury needing imaging, just Tylenol prn. Will cont current meds, if anteroflexion dosesn't improve with recent drop in haldol will consider further dose reduction  2/27 Has had slow increase in EPS despite no increase in dose and some reduction will hold haldol to reduce EPS and restart at lower dose  2/28 Patient with change in med tolerance some agitation and sedation. Will hold haldol tonight consider changing to Prolixin as may be a little better for EPS and will check labs to look for any medical issue  2/29 Labile irritable, increased tremor improved with washout of haldol. Will restart haldol but see if she can be stabilized at low dose to minimize tremor. COuld consider increasing Seroquel  3/1 Improved will see if she can stabilize on Seroquel and low dose haldol to minimize EPS will address nasal symptoms  3/2 Patient better less bent tremor less calmer. Attempt to find dose of haldol that control psychosis agitation with manageable tremor. May need to try 2mg bid as 1mg big may be subtherapeutic  3/4 Some improvement in that less agitated but EPS better will cont with low dose haldol Issues is that both side effects and decompenstion have had long lags related to dose changes so unclear if at haldol 1mg bid she is better EPS wise but will gradually decompensate. Cont to observe consider increase to 1mg tid. Patient's 2PC expiring she remains delusional disorganized not yet able to managed in SNF will apply for further retention  3/5 Showing some improvement with less agitation despite delusions but also less EPS with lowered haldol. Cont meds will reduce klonopin from 0.25mg tid to bid to reduce polypharm cont other meds. If calmer less disorganized will ask PT if she can walker trial  3/6: behavior in better control, delusional, suspected AH. taking meds.   3/7: can become irritable, redirectable. c/w current meds, ambulating hunched over.   3/8: went to interventional Urology on Fri, UA ordered, ultrasound of bladder scheduled for Monday at 10 AM,  and ob gyn for pessary ordered. If blood in urine—send back to urology office for cysto (please see the full consult in chart)  3/9: Somewhat sedated this AM, calm, not agitated, will follow and adjust meds as necessary (clonazepam)   3/10: More alert today, suspicious of examiner, UA negative  3/11: Mistrustfulness remains, leading to irritability with staff; no prns given   3/12: Labile and irritability noted, poor insight and requires continued inpatient psychiatric hospitalization for safety and stabilization.  3/13: labile, irritable on approach, redirectable. c/w current meds. Delusions noted on exam.  3/14: Labile and irritability noted, poor insight and requires continued inpatient psychiatric hospitalization for safety and stabilization.  3/15: labile, irritable on approach, redirectable. c/w current meds.  3/18 doing poor on regimen where haldol on low dose ineffective or causes sig tremor and anterflexion on higher doses. Will stop and review alternatives such as prolixin or risperidone  3/19 Patient with less sedation , still sig trremor. Delusional irritable, in altercation with roommate sees her room as belonging solely to her. Cont off haldol consider second antipsychotic has been on olanzapine, Prolixin, risperidone, she believes has not been on Abilify will consider this as trial with option for SHAFER  3/20 Psychotic irritable disorganized may go back to trial of olanzapine to allow for monotherapy and less EPS. Spoke with cally will lower Valtrex as dose is above recommended for maintenance  3/21 Louder more intrusive. Will titrate olanzapine ands use prns target 20-30mg, may need second antipsychotic given treatment resistance and clozapine not feasible  3/22 Patient sl calmer with po and prn olanzapine still lous intrusive irritable, needing prns. will increase to 5mg tid. Plan when calmer attempt to simplify regimen to bid to improve compliance.  3/23: no PRN overnight/this am. Noted walking in dayroom, calmer, no aggression now.  3/24: s/p fall, no injury, noted walking in dining area, remains hostile, easily agitated and uncooperative.   3/25 Patient loud irritable  but somewhat less disorganized. Seen by PT not assessed as sig Parkinsonized. will cont meds, will try to check VPA level in AM. Feel due to reduced disorganization patient no longer requires  will place on routine checks , will check labs including VPa level in AM  3/26 Modestly better on olanzpine. Cont trial consider increasing to 10mg bid.  Reorder labs when patient more cooperative  3/27 Modest improvement, cont olanzapine but will now move to bid dosing also Depakote and Sinemet will be made bid to decrease number of times per day she needs to be approached for medication  3/28 Modest gains from olanzapine with less EPS than when on haloperidol. COnt treatment will increase to 10mg bid try to obtain VPA level in AM  3/29 Cont olanzapine trial. Considering other options patient refuses li and likely would be hard to follow with labs. Patient feels Abilify helped in past will consider rechallenge with cross taper  3/30 Poor reponse to Zyprexa will change prn to Ativan /fluphenazine consider then standing prolicxin at low dose as only time patient stable was when she was on haldol but then developed EPS at higher dose  3/31 Remains highly agitated. Only time patient better was combo atypical and conventional will add low dose fluphenazine to olanzapine  4/1 Agitated needing prn. Will d/c olanzapine as has been completely ineffective. Will use Prolixin as she appears to respond to conventional antipsychotics but given EPS vulnerability will add Cogentin as there is no other option to manage EPS  4/2 Loud agitated psychotic no worse off olanzapine will increase fluphenazine to 2mg  bid  4/3 Loud, irritable, Orthodoxy delusions, very disorganized behavior such as placing self on floors, placing paper crosses on floor. Will increase Prolixin to 2.5mg bid  4/4 Psychotic , agitated disorganized. Tolerating current Prolixin, plan titrate but need to give time on each dose as she tends to have side effects lags.   4/5: irritable, religiously focused, taking medications.   4/8 Modest gains, reduce need for prns since starting prolixin, cont current dose based on level of EPS may increase to 7.5mg/d vs re add Seroquel  4/9 Somewhat better will hold off on adding another med. Will give more time on each dose based that in past she had delayed side effects  4/10 Patient worse today Needing IM will increase Prolixin as no clear indication EPS worse. If not improving with fluphenazine monotherapy consider as above rechallenge with quetiapine add on  4/12: The patient continues to be psychotic, intermittently compliant with medications.  Intermittent agitation, needing prn's.  Continue Prolixin and Seroquel trial  4/15: The patient continues to be psychotic, more compliant with medications overall.  Intermittent agitation continues.  Will increase Seroquel to 50mg bid.  Continue prolixin.  4/16: Calmer overall, but remains psychotic and disorganized.  Intermittent agitation.  Continue Seroquel and Prolixin.

## 2024-04-16 NOTE — BH INPATIENT PSYCHIATRY PROGRESS NOTE - NSBHFUPINTERVALHXFT_PSY_A_CORE
Patient seen for follow up of psychosis with aggression, chart reviewed, and case discussed with treatment team.  No events reported overnight.  The patient is somewhat calmer today, less irritable, less labile and angry.  She remains disorganized, tangential and loose, difficult to follow train of thought.  Some paranoid themes persist.  The patient has been more consistent with medications overall, tolerating them well.  She has been visible on the unit, walking the halls.  The patient has been eating and sleeping well.

## 2024-04-16 NOTE — BH INPATIENT PSYCHIATRY PROGRESS NOTE - MSE UNSTRUCTURED FT
Appearance: casual grooming, no overt deficits in hygiene;  sig tremor, loose at elbows and wrist. Walks with anteroflexed gait though no shuffling or cogwheeling  Behavior: calmer, but still agitated at times, makes attempts to be cooperative with interview  Speech: fluent, normal tone, rate, volume  Mood: "okay"  Affect: irritable, but less labile overall  Thought process: disorganized, tangential, loose  Thought content: paranoid accusatory denies SI/HI. Confucianism delusions.  Perceptual disturbances: no appearance of internal preoccupation  Cognition: adequately oriented  Insight: limited  Judgement: limited

## 2024-04-16 NOTE — BH INPATIENT PSYCHIATRY PROGRESS NOTE - NSBHMETABOLIC_PSY_ALL_CORE_FT
BMI: BMI (kg/m2): 30.2 (01-15-24 @ 12:52)  HbA1c: A1C with Estimated Average Glucose Result: 7.6 % (01-12-24 @ 12:40)    Glucose: POCT Blood Glucose.: 93 mg/dL (04-16-24 @ 08:36)    BP: 144/88 (04-16-24 @ 08:40) (137/84 - 144/88)Vital Signs Last 24 Hrs  T(C): --  T(F): --  HR: 96 (04-16-24 @ 08:40) (96 - 96)  BP: 144/88 (04-16-24 @ 08:40) (144/88 - 144/88)  BP(mean): --  RR: --  SpO2: --      Lipid Panel:

## 2024-04-17 PROCEDURE — 99232 SBSQ HOSP IP/OBS MODERATE 35: CPT

## 2024-04-17 RX ADMIN — QUETIAPINE FUMARATE 50 MILLIGRAM(S): 200 TABLET, FILM COATED ORAL at 09:28

## 2024-04-17 RX ADMIN — CARBIDOPA AND LEVODOPA 1.5 TABLET(S): 25; 100 TABLET ORAL at 09:27

## 2024-04-17 RX ADMIN — Medication 400 UNIT(S): at 09:28

## 2024-04-17 RX ADMIN — POLYETHYLENE GLYCOL 3350 17 GRAM(S): 17 POWDER, FOR SOLUTION ORAL at 09:31

## 2024-04-17 RX ADMIN — LISINOPRIL 20 MILLIGRAM(S): 2.5 TABLET ORAL at 09:28

## 2024-04-17 RX ADMIN — DIVALPROEX SODIUM 750 MILLIGRAM(S): 500 TABLET, DELAYED RELEASE ORAL at 20:17

## 2024-04-17 RX ADMIN — CARBIDOPA AND LEVODOPA 1.5 TABLET(S): 25; 100 TABLET ORAL at 20:17

## 2024-04-17 RX ADMIN — Medication 1 MILLIGRAM(S): at 20:17

## 2024-04-17 RX ADMIN — METFORMIN HYDROCHLORIDE 500 MILLIGRAM(S): 850 TABLET ORAL at 20:17

## 2024-04-17 RX ADMIN — FLUPHENAZINE HYDROCHLORIDE 5 MILLIGRAM(S): 1 TABLET, FILM COATED ORAL at 09:28

## 2024-04-17 RX ADMIN — FLUPHENAZINE HYDROCHLORIDE 2.5 MILLIGRAM(S): 1 TABLET, FILM COATED ORAL at 20:17

## 2024-04-17 RX ADMIN — SENNA PLUS 2 TABLET(S): 8.6 TABLET ORAL at 20:18

## 2024-04-17 RX ADMIN — QUETIAPINE FUMARATE 50 MILLIGRAM(S): 200 TABLET, FILM COATED ORAL at 20:16

## 2024-04-17 RX ADMIN — Medication 1 MILLIGRAM(S): at 09:28

## 2024-04-17 RX ADMIN — Medication 50 MILLIGRAM(S): at 09:27

## 2024-04-17 RX ADMIN — Medication 75 MICROGRAM(S): at 06:45

## 2024-04-17 RX ADMIN — FLUPHENAZINE HYDROCHLORIDE 2.5 MILLIGRAM(S): 1 TABLET, FILM COATED ORAL at 16:49

## 2024-04-17 RX ADMIN — METFORMIN HYDROCHLORIDE 500 MILLIGRAM(S): 850 TABLET ORAL at 09:29

## 2024-04-17 RX ADMIN — DIVALPROEX SODIUM 750 MILLIGRAM(S): 500 TABLET, DELAYED RELEASE ORAL at 09:26

## 2024-04-17 RX ADMIN — Medication 1 TABLET(S): at 20:16

## 2024-04-17 NOTE — BH INPATIENT PSYCHIATRY PROGRESS NOTE - PRN MEDS
MEDICATIONS  (PRN):  acetaminophen     Tablet .. 650 milliGRAM(s) Oral every 6 hours PRN Mild Pain (1 - 3), Moderate Pain (4 - 6)  albuterol    90 MICROgram(s) HFA Inhaler 2 Puff(s) Inhalation every 6 hours PRN Shortness of Breath and/or Wheezing  aluminum hydroxide/magnesium hydroxide/simethicone Suspension 30 milliLiter(s) Oral every 6 hours PRN Dyspepsia  bisacodyl Suppository 10 milliGRAM(s) Rectal daily PRN constipation  dextrose Oral Gel 15 Gram(s) Oral once PRN Blood Glucose LESS THAN 70 milliGRAM(s)/deciliter  diphenhydrAMINE Injectable 25 milliGRAM(s) IntraMuscular once PRN agitation  fluPHENAZine 2.5 milliGRAM(s) Oral every 6 hours PRN agitation  fluPHENAZine  Injectable 2 milliGRAM(s) IntraMuscular once PRN agitation  LORazepam   Injectable 1 milliGRAM(s) IntraMuscular once PRN severe agitation  LORazepam   Injectable 1 milliGRAM(s) IntraMuscular once PRN severe agitation  simethicone 80 milliGRAM(s) Chew every 8 hours PRN gas  sodium chloride 0.65% Nasal 2 Spray(s) Both Nostrils every 6 hours PRN congestion

## 2024-04-17 NOTE — BH INPATIENT PSYCHIATRY PROGRESS NOTE - CURRENT MEDICATION
MEDICATIONS  (STANDING):  ammonium lactate 12% Lotion 1 Application(s) Topical two times a day  benztropine 1 milliGRAM(s) Oral two times a day  carbidopa/levodopa  25/100 1.5 Tablet(s) Oral <User Schedule>  cholecalciferol 400 Unit(s) Oral daily  divalproex Sprinkle 750 milliGRAM(s) Oral two times a day  fluPHENAZine 5 milliGRAM(s) Oral daily  fluPHENAZine 2.5 milliGRAM(s) Oral at bedtime  glucagon  Injectable 1 milliGRAM(s) IntraMuscular once  hemorrhoidal Ointment 1 Application(s) Rectal daily  hydrocortisone 2.5% Ointment 1 Application(s) Topical two times a day  insulin lispro (ADMELOG) corrective regimen sliding scale   SubCutaneous three times a day before meals  levothyroxine 75 MICROGram(s) Oral daily  lisinopril 20 milliGRAM(s) Oral daily  metFORMIN 500 milliGRAM(s) Oral two times a day  metoprolol succinate ER 50 milliGRAM(s) Oral daily  multivitamin 1 Tablet(s) Oral at bedtime  polyethylene glycol 3350 17 Gram(s) Oral daily  QUEtiapine 50 milliGRAM(s) Oral two times a day  senna 2 Tablet(s) Oral at bedtime    MEDICATIONS  (PRN):  acetaminophen     Tablet .. 650 milliGRAM(s) Oral every 6 hours PRN Mild Pain (1 - 3), Moderate Pain (4 - 6)  albuterol    90 MICROgram(s) HFA Inhaler 2 Puff(s) Inhalation every 6 hours PRN Shortness of Breath and/or Wheezing  aluminum hydroxide/magnesium hydroxide/simethicone Suspension 30 milliLiter(s) Oral every 6 hours PRN Dyspepsia  bisacodyl Suppository 10 milliGRAM(s) Rectal daily PRN constipation  dextrose Oral Gel 15 Gram(s) Oral once PRN Blood Glucose LESS THAN 70 milliGRAM(s)/deciliter  diphenhydrAMINE Injectable 25 milliGRAM(s) IntraMuscular once PRN agitation  fluPHENAZine 2.5 milliGRAM(s) Oral every 6 hours PRN agitation  fluPHENAZine  Injectable 2 milliGRAM(s) IntraMuscular once PRN agitation  LORazepam   Injectable 1 milliGRAM(s) IntraMuscular once PRN severe agitation  LORazepam   Injectable 1 milliGRAM(s) IntraMuscular once PRN severe agitation  simethicone 80 milliGRAM(s) Chew every 8 hours PRN gas  sodium chloride 0.65% Nasal 2 Spray(s) Both Nostrils every 6 hours PRN congestion

## 2024-04-17 NOTE — BH INPATIENT PSYCHIATRY PROGRESS NOTE - NSBHFUPINTERVALHXFT_PSY_A_CORE
Patient seen for follow up of psychosis with aggression, chart reviewed, and case discussed with treatment team.  Patient become agitated and combative yesterday afternoon, needing IM prn to maintain safety.  She was more calm afterward.  This morning, the patient is calmer, more verbal, and more goal directed overall, although she becomes more tangential and loose with longer conversation.  She is less irritable this morning.  Some paranoid themes persist.  The patient has been more consistent with medications overall, tolerating them well.  She has been visible on the unit, walking the halls.  The patient has been eating and sleeping well.
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
## 2024-04-17 NOTE — BH INPATIENT PSYCHIATRY PROGRESS NOTE - NSBHMETABOLIC_PSY_ALL_CORE_FT
BMI: BMI (kg/m2): 30.2 (01-15-24 @ 12:52)  HbA1c: A1C with Estimated Average Glucose Result: 7.6 % (01-12-24 @ 12:40)    Glucose: POCT Blood Glucose.: 93 mg/dL (04-16-24 @ 08:36)    BP: 124/68 (04-17-24 @ 09:25) (124/68 - 144/88)Vital Signs Last 24 Hrs  T(C): --  T(F): --  HR: 78 (04-17-24 @ 09:25) (78 - 78)  BP: 124/68 (04-17-24 @ 09:25) (124/68 - 124/68)  BP(mean): --  RR: 16 (04-17-24 @ 09:25) (16 - 16)  SpO2: --      Lipid Panel:

## 2024-04-17 NOTE — BH INPATIENT PSYCHIATRY PROGRESS NOTE - MSE UNSTRUCTURED FT
Appearance: casual grooming, poor hygiene;  sig tremor. Walks with anteroflexed gait though no shuffling or cogwheeling  Behavior: calmer, but still agitated at times, makes attempts to be cooperative with interview  Speech: fluent, normal tone, rate, volume  Mood: "okay"  Affect: calmer, more pleasant, less labile overall  Thought process: more goal directed, but becomes disorganized, tangential, loose  Thought content: paranoid accusatory denies SI/HI. Muslim delusions.  Perceptual disturbances: no appearance of internal preoccupation  Cognition: adequately oriented  Insight: limited  Judgement: limited

## 2024-04-17 NOTE — BH INPATIENT PSYCHIATRY PROGRESS NOTE - NSBHCHARTREVIEWVS_PSY_A_CORE FT
Vital Signs Last 24 Hrs  T(C): --  T(F): --  HR: 78 (04-17-24 @ 09:25) (78 - 78)  BP: 124/68 (04-17-24 @ 09:25) (124/68 - 124/68)  BP(mean): --  RR: 16 (04-17-24 @ 09:25) (16 - 16)  SpO2: --

## 2024-04-18 PROCEDURE — 99232 SBSQ HOSP IP/OBS MODERATE 35: CPT

## 2024-04-18 RX ORDER — FLUPHENAZINE HYDROCHLORIDE 1 MG/1
2 TABLET, FILM COATED ORAL ONCE
Refills: 0 | Status: COMPLETED | OUTPATIENT
Start: 2024-04-18 | End: 2024-04-18

## 2024-04-18 RX ORDER — QUETIAPINE FUMARATE 200 MG/1
75 TABLET, FILM COATED ORAL
Refills: 0 | Status: DISCONTINUED | OUTPATIENT
Start: 2024-04-18 | End: 2024-04-22

## 2024-04-18 RX ADMIN — METFORMIN HYDROCHLORIDE 500 MILLIGRAM(S): 850 TABLET ORAL at 21:01

## 2024-04-18 RX ADMIN — LISINOPRIL 20 MILLIGRAM(S): 2.5 TABLET ORAL at 08:31

## 2024-04-18 RX ADMIN — Medication 1 MILLIGRAM(S): at 08:31

## 2024-04-18 RX ADMIN — QUETIAPINE FUMARATE 50 MILLIGRAM(S): 200 TABLET, FILM COATED ORAL at 08:31

## 2024-04-18 RX ADMIN — DIVALPROEX SODIUM 750 MILLIGRAM(S): 500 TABLET, DELAYED RELEASE ORAL at 08:29

## 2024-04-18 RX ADMIN — Medication 50 MILLIGRAM(S): at 08:32

## 2024-04-18 RX ADMIN — METFORMIN HYDROCHLORIDE 500 MILLIGRAM(S): 850 TABLET ORAL at 08:32

## 2024-04-18 RX ADMIN — POLYETHYLENE GLYCOL 3350 17 GRAM(S): 17 POWDER, FOR SOLUTION ORAL at 08:32

## 2024-04-18 RX ADMIN — FLUPHENAZINE HYDROCHLORIDE 5 MILLIGRAM(S): 1 TABLET, FILM COATED ORAL at 08:31

## 2024-04-18 RX ADMIN — QUETIAPINE FUMARATE 75 MILLIGRAM(S): 200 TABLET, FILM COATED ORAL at 21:00

## 2024-04-18 RX ADMIN — FLUPHENAZINE HYDROCHLORIDE 2.5 MILLIGRAM(S): 1 TABLET, FILM COATED ORAL at 20:59

## 2024-04-18 RX ADMIN — Medication 400 UNIT(S): at 08:31

## 2024-04-18 RX ADMIN — Medication 75 MICROGRAM(S): at 06:21

## 2024-04-18 RX ADMIN — SENNA PLUS 2 TABLET(S): 8.6 TABLET ORAL at 21:00

## 2024-04-18 RX ADMIN — DIVALPROEX SODIUM 750 MILLIGRAM(S): 500 TABLET, DELAYED RELEASE ORAL at 21:00

## 2024-04-18 RX ADMIN — Medication 1 MILLIGRAM(S): at 21:01

## 2024-04-18 RX ADMIN — FLUPHENAZINE HYDROCHLORIDE 2 MILLIGRAM(S): 1 TABLET, FILM COATED ORAL at 08:58

## 2024-04-18 RX ADMIN — Medication 1 TABLET(S): at 21:01

## 2024-04-18 RX ADMIN — CARBIDOPA AND LEVODOPA 1.5 TABLET(S): 25; 100 TABLET ORAL at 21:00

## 2024-04-18 RX ADMIN — CARBIDOPA AND LEVODOPA 1.5 TABLET(S): 25; 100 TABLET ORAL at 08:30

## 2024-04-18 NOTE — BH TREATMENT PLAN - NSTXPATIENTPARTICIPATE_PSY_ALL_CORE
No, patient unwilling to participate
Patient participated in identification of needs/problems/goals for treatment
Patient participated in identification of needs/problems/goals for treatment

## 2024-04-18 NOTE — BH TREATMENT PLAN - NSTXPLANTHERAPYSESSIONSFT_PSY_ALL_CORE
03-04-24  Type of therapy: Coping skills, Creative arts therapy, Health and fitness, Spirituality, Stress management  Type of session: Group  Level of patient participation: Participated with encouragement  Duration of participation: Less than 15 minutes  Therapy conducted by: Psych rehab  Therapy Summary: Over the past week patient did not make any major gains towards her rehab goals. Patient continues to have periods of agitation, restlessness and intrusive behavior. Patient at times becomes demanding, verbally abusive and hyper-verbal. Patient at times requires firm limit setting and redirection. However, patient at times attends some of the morning and afternoon rehab groups. Patient responds to some of the music and art groups. Patient is compliant with her medications and eats her meals with the community.  
  04-12-24  --  Type of session: Individual  Level of patient participation: Participates  --  Therapy conducted by: Other (specify), Physical therapy  Therapy Summary: Patient engaged in ambulation within unit with supervision.    04-15-24  Type of therapy: Coping skills, Creative arts therapy, Health and fitness, Spirituality, Stress management  Type of session: Group  Level of patient participation: Disruptive  Duration of participation: Less than 15 minutes  Therapy conducted by: Psych rehab  Therapy Summary: Over the past week patient has not made any major gains towards her rehab goals. Patient remains very agitated, sexually inappropriate, socially intrusive and demanding. Patient remains impulsve, restless, paranoid and verbally abusive. Patient has very poor insight into her social behavior. Patient continues to appear disheveled. Patient continues to become very disruptive in rehab group activities.

## 2024-04-18 NOTE — BH TREATMENT PLAN - NSTXPSYCHOGOAL_PSY_ALL_CORE
Make at least 5 reality based statements/requests to staff and/or peers
Will be able to report experiencing hallucinations to staff
Will report using a mindfulness skill to be able to read 5 pages of a book daily despite hallucinations

## 2024-04-18 NOTE — BH TREATMENT PLAN - NSTXDISORGGOAL_PSY_ALL_CORE
Will make at least 3 goal and reality oriented statements during therapy
Will demonstrate purposeful and predictable thoughts/behaviors by making a request
Will verbalize 1 strategy to successfully cope with disturbed thinking

## 2024-04-18 NOTE — BH TREATMENT PLAN - NSTXDCOTHRGOAL_PSY_ALL_CORE
The pt will be discharged back to [facility] for LTC.
The pt will be discharged to an SNF for LTC.

## 2024-04-18 NOTE — BH INPATIENT PSYCHIATRY PROGRESS NOTE - NSBHFUPINTERVALHXFT_PSY_A_CORE
Patient seen for follow up of psychosis with aggression, chart reviewed, and case discussed with treatment team.  Patient was agitated and combative again this morning, not responding to redirection.  Patient required IM prn to maintain safety, given with good effect.  The patient continues to be disorganized, tangential, labile, quick to anger.  She remains paranoid.  The patient has been more consistent with medications overall, tolerating them well.  She has been visible on the unit, walking the halls.  The patient has been eating and sleeping well.

## 2024-04-18 NOTE — BH TREATMENT PLAN - NSTXVIOLNTGOAL_PSY_ALL_CORE
Will be able to express understanding of at least one trigger to their aggressive behavior
Will demonstrate ability to tolerate peer conflicts without aggression 3x weekly
Will decrease the number/duration/intensity of angry/aggressive outbursts

## 2024-04-18 NOTE — BH INPATIENT PSYCHIATRY PROGRESS NOTE - NSBHMETABOLIC_PSY_ALL_CORE_FT
BMI: BMI (kg/m2): 30.2 (01-15-24 @ 12:52)  HbA1c: A1C with Estimated Average Glucose Result: 7.6 % (01-12-24 @ 12:40)    Glucose: POCT Blood Glucose.: 93 mg/dL (04-16-24 @ 08:36)    BP: 124/68 (04-17-24 @ 09:25) (124/68 - 144/88)Vital Signs Last 24 Hrs  T(C): --  T(F): --  HR: --  BP: --  BP(mean): --  RR: --  SpO2: --      Lipid Panel:

## 2024-04-18 NOTE — BH TREATMENT PLAN - NSTXCOPEINTERPR_PSY_ALL_CORE
Patient will identify and utilize two coping skills daily to increase her frustration tolerance and decrease agitated behavior within seven days.
Patient will identify and utilize two coping skills daily to increase her frustration tolerance and decreae her agitation within seven days.

## 2024-04-18 NOTE — BH INPATIENT PSYCHIATRY PROGRESS NOTE - CURRENT MEDICATION
MEDICATIONS  (STANDING):  ammonium lactate 12% Lotion 1 Application(s) Topical two times a day  benztropine 1 milliGRAM(s) Oral two times a day  carbidopa/levodopa  25/100 1.5 Tablet(s) Oral <User Schedule>  cholecalciferol 400 Unit(s) Oral daily  divalproex Sprinkle 750 milliGRAM(s) Oral two times a day  fluPHENAZine 2.5 milliGRAM(s) Oral at bedtime  fluPHENAZine 5 milliGRAM(s) Oral daily  glucagon  Injectable 1 milliGRAM(s) IntraMuscular once  hemorrhoidal Ointment 1 Application(s) Rectal daily  hydrocortisone 2.5% Ointment 1 Application(s) Topical two times a day  insulin lispro (ADMELOG) corrective regimen sliding scale   SubCutaneous three times a day before meals  levothyroxine 75 MICROGram(s) Oral daily  lisinopril 20 milliGRAM(s) Oral daily  metFORMIN 500 milliGRAM(s) Oral two times a day  metoprolol succinate ER 50 milliGRAM(s) Oral daily  multivitamin 1 Tablet(s) Oral at bedtime  polyethylene glycol 3350 17 Gram(s) Oral daily  QUEtiapine 75 milliGRAM(s) Oral two times a day  senna 2 Tablet(s) Oral at bedtime    MEDICATIONS  (PRN):  acetaminophen     Tablet .. 650 milliGRAM(s) Oral every 6 hours PRN Mild Pain (1 - 3), Moderate Pain (4 - 6)  albuterol    90 MICROgram(s) HFA Inhaler 2 Puff(s) Inhalation every 6 hours PRN Shortness of Breath and/or Wheezing  aluminum hydroxide/magnesium hydroxide/simethicone Suspension 30 milliLiter(s) Oral every 6 hours PRN Dyspepsia  bisacodyl Suppository 10 milliGRAM(s) Rectal daily PRN constipation  dextrose Oral Gel 15 Gram(s) Oral once PRN Blood Glucose LESS THAN 70 milliGRAM(s)/deciliter  diphenhydrAMINE Injectable 25 milliGRAM(s) IntraMuscular once PRN agitation  fluPHENAZine 2.5 milliGRAM(s) Oral every 6 hours PRN agitation  fluPHENAZine  Injectable 2 milliGRAM(s) IntraMuscular once PRN agitation  LORazepam   Injectable 1 milliGRAM(s) IntraMuscular once PRN severe agitation  LORazepam   Injectable 1 milliGRAM(s) IntraMuscular once PRN severe agitation  simethicone 80 milliGRAM(s) Chew every 8 hours PRN gas  sodium chloride 0.65% Nasal 2 Spray(s) Both Nostrils every 6 hours PRN congestion

## 2024-04-18 NOTE — BH INPATIENT PSYCHIATRY PROGRESS NOTE - MSE UNSTRUCTURED FT
Appearance: casual grooming, poor hygiene;  sig tremor. Walks with anteroflexed gait though no shuffling or cogwheeling  Behavior: Intermittently agitated, makes attempts to be cooperative with interview  Speech: fluent, irritable in tone, normal rate volume  Mood: "not good"  Affect: irritable, labile  Thought process: disorganized, tangential, loose  Thought content: paranoid accusatory denies SI/HI. Voodoo delusions.  Perceptual disturbances: no appearance of internal preoccupation  Cognition: adequately oriented  Insight: limited  Judgement: limited

## 2024-04-18 NOTE — BH TREATMENT PLAN - NSTXIMPULSGOAL_PSY_ALL_CORE
Will identify 1 thought that precedes an impulsive acts
Will be able to demonstrate the ability to pause before acting out negatively
Will identify 1 thought that precedes an impulsive acts

## 2024-04-18 NOTE — BH TREATMENT PLAN - NSTXDIABGOAL_PSY_ALL_CORE
Will be able to able to describe prescribed diabetic medications and how to properly take these medications
Will identify modifiable risk factors and strategies to counteract them

## 2024-04-18 NOTE — BH TREATMENT PLAN - NSTXDCOTHRINTERSW_PSY_ALL_CORE
SW provides pt support and psychoeducation, encourages compliance with medication and care, and addresses the discharge plan of pt returning to [facility] for LTC. SW maintains contact with the pt's sister, providing treatment updates and discussing the plan of pt returning to [facility] which pt's sister supports. TRISH will initiate a Level II Review in [facility] of the pt's improving mental status and functioning in preparation of discharge for SNF level of care.
SW encourages pt's compliance with treatment and care and stresses the importance of behavioral control. SW maintains contact with pt's sister, providing treatment update.

## 2024-04-18 NOTE — BH INPATIENT PSYCHIATRY PROGRESS NOTE - NSBHASSESSSUMMFT_PSY_ALL_CORE
2/5 Patient showing some improving trend will give more time, if needs more medication titration due to ongoing symptoms will increase Seroquel not haldol  2/6 Improving cont meds, offered prn suppository and simethicone  2/7 Slowly improving will cont current meds, eval need for further increase Seroquel  2/8 Partial response continue meds except will increase Seroquel as was on 250mg/d PTA  2/9 Overall gains, still regresses and becomes difficult to manage will increase Seroquel to 200mg/d in divided dosing  2/12 A little calmer since started on additional mid day, cont other meds w/o change  2/13 Patient better still disorganized, hyper Sabianist but less agitated, cont current treatment  2/14 Improved globally with some symptom variability. Cont current med consider further increment Seroquel  2/15 Improved cont current regimen for now  2/16 Showing  some gains continue treatment for now at current doses   2/20 Improved, cont current rx. Scheduled urogyn for pessary replacement  2/21 Improved with variable intrusiveness. Cont meds will increase Miralax and Senna  2/22 Improving cont current rx, observe for effect of re-titration of laxatives  2/23 Lost balance mainly due to behavior however to be safe will lower haldol and Klonopin modestly and observe. Otherwise generally manageable  2/24: No PRNs needed overnight. Compliant with meds. As per staff last night pt was banging her Bible against the wall and lost her balance, staff held her. Pt remains talkative and intrusive at times but overall calmer and redirectable.   2/25: remains labile, loud, easily agitated but more redirectable.  2/26 Patient seen by medicine no evidence injury needing imaging, just Tylenol prn. Will cont current meds, if anteroflexion dosesn't improve with recent drop in haldol will consider further dose reduction  2/27 Has had slow increase in EPS despite no increase in dose and some reduction will hold haldol to reduce EPS and restart at lower dose  2/28 Patient with change in med tolerance some agitation and sedation. Will hold haldol tonight consider changing to Prolixin as may be a little better for EPS and will check labs to look for any medical issue  2/29 Labile irritable, increased tremor improved with washout of haldol. Will restart haldol but see if she can be stabilized at low dose to minimize tremor. COuld consider increasing Seroquel  3/1 Improved will see if she can stabilize on Seroquel and low dose haldol to minimize EPS will address nasal symptoms  3/2 Patient better less bent tremor less calmer. Attempt to find dose of haldol that control psychosis agitation with manageable tremor. May need to try 2mg bid as 1mg big may be subtherapeutic  3/4 Some improvement in that less agitated but EPS better will cont with low dose haldol Issues is that both side effects and decompenstion have had long lags related to dose changes so unclear if at haldol 1mg bid she is better EPS wise but will gradually decompensate. Cont to observe consider increase to 1mg tid. Patient's 2PC expiring she remains delusional disorganized not yet able to managed in SNF will apply for further retention  3/5 Showing some improvement with less agitation despite delusions but also less EPS with lowered haldol. Cont meds will reduce klonopin from 0.25mg tid to bid to reduce polypharm cont other meds. If calmer less disorganized will ask PT if she can walker trial  3/6: behavior in better control, delusional, suspected AH. taking meds.   3/7: can become irritable, redirectable. c/w current meds, ambulating hunched over.   3/8: went to interventional Urology on Fri, UA ordered, ultrasound of bladder scheduled for Monday at 10 AM,  and ob gyn for pessary ordered. If blood in urine—send back to urology office for cysto (please see the full consult in chart)  3/9: Somewhat sedated this AM, calm, not agitated, will follow and adjust meds as necessary (clonazepam)   3/10: More alert today, suspicious of examiner, UA negative  3/11: Mistrustfulness remains, leading to irritability with staff; no prns given   3/12: Labile and irritability noted, poor insight and requires continued inpatient psychiatric hospitalization for safety and stabilization.  3/13: labile, irritable on approach, redirectable. c/w current meds. Delusions noted on exam.  3/14: Labile and irritability noted, poor insight and requires continued inpatient psychiatric hospitalization for safety and stabilization.  3/15: labile, irritable on approach, redirectable. c/w current meds.  3/18 doing poor on regimen where haldol on low dose ineffective or causes sig tremor and anterflexion on higher doses. Will stop and review alternatives such as prolixin or risperidone  3/19 Patient with less sedation , still sig trremor. Delusional irritable, in altercation with roommate sees her room as belonging solely to her. Cont off haldol consider second antipsychotic has been on olanzapine, Prolixin, risperidone, she believes has not been on Abilify will consider this as trial with option for SHAFER  3/20 Psychotic irritable disorganized may go back to trial of olanzapine to allow for monotherapy and less EPS. Spoke with cally will lower Valtrex as dose is above recommended for maintenance  3/21 Louder more intrusive. Will titrate olanzapine ands use prns target 20-30mg, may need second antipsychotic given treatment resistance and clozapine not feasible  3/22 Patient sl calmer with po and prn olanzapine still lous intrusive irritable, needing prns. will increase to 5mg tid. Plan when calmer attempt to simplify regimen to bid to improve compliance.  3/23: no PRN overnight/this am. Noted walking in dayroom, calmer, no aggression now.  3/24: s/p fall, no injury, noted walking in dining area, remains hostile, easily agitated and uncooperative.   3/25 Patient loud irritable  but somewhat less disorganized. Seen by PT not assessed as sig Parkinsonized. will cont meds, will try to check VPA level in AM. Feel due to reduced disorganization patient no longer requires  will place on routine checks , will check labs including VPa level in AM  3/26 Modestly better on olanzpine. Cont trial consider increasing to 10mg bid.  Reorder labs when patient more cooperative  3/27 Modest improvement, cont olanzapine but will now move to bid dosing also Depakote and Sinemet will be made bid to decrease number of times per day she needs to be approached for medication  3/28 Modest gains from olanzapine with less EPS than when on haloperidol. COnt treatment will increase to 10mg bid try to obtain VPA level in AM  3/29 Cont olanzapine trial. Considering other options patient refuses li and likely would be hard to follow with labs. Patient feels Abilify helped in past will consider rechallenge with cross taper  3/30 Poor reponse to Zyprexa will change prn to Ativan /fluphenazine consider then standing prolicxin at low dose as only time patient stable was when she was on haldol but then developed EPS at higher dose  3/31 Remains highly agitated. Only time patient better was combo atypical and conventional will add low dose fluphenazine to olanzapine  4/1 Agitated needing prn. Will d/c olanzapine as has been completely ineffective. Will use Prolixin as she appears to respond to conventional antipsychotics but given EPS vulnerability will add Cogentin as there is no other option to manage EPS  4/2 Loud agitated psychotic no worse off olanzapine will increase fluphenazine to 2mg  bid  4/3 Loud, irritable, Sabianist delusions, very disorganized behavior such as placing self on floors, placing paper crosses on floor. Will increase Prolixin to 2.5mg bid  4/4 Psychotic , agitated disorganized. Tolerating current Prolixin, plan titrate but need to give time on each dose as she tends to have side effects lags.   4/5: irritable, religiously focused, taking medications.   4/8 Modest gains, reduce need for prns since starting prolixin, cont current dose based on level of EPS may increase to 7.5mg/d vs re add Seroquel  4/9 Somewhat better will hold off on adding another med. Will give more time on each dose based that in past she had delayed side effects  4/10 Patient worse today Needing IM will increase Prolixin as no clear indication EPS worse. If not improving with fluphenazine monotherapy consider as above rechallenge with quetiapine add on  4/12: The patient continues to be psychotic, intermittently compliant with medications.  Intermittent agitation, needing prn's.  Continue Prolixin and Seroquel trial  4/15: The patient continues to be psychotic, more compliant with medications overall.  Intermittent agitation continues.  Will increase Seroquel to 50mg bid.  Continue prolixin.  4/16-17: Calmer overall at times, but remains psychotic and disorganized.  Intermittent agitation continues.  Continue Seroquel and Prolixin.  4/18: Agitated this morning, needing prn  Remains psychotic and disorganized.  Increase Seroquel to 75mg bid, tolerating well.

## 2024-04-19 LAB
ANION GAP SERPL CALC-SCNC: 16 MMOL/L — HIGH (ref 7–14)
BASOPHILS # BLD AUTO: 0.04 K/UL — SIGNIFICANT CHANGE UP (ref 0–0.2)
BASOPHILS NFR BLD AUTO: 0.6 % — SIGNIFICANT CHANGE UP (ref 0–2)
BUN SERPL-MCNC: 23 MG/DL — SIGNIFICANT CHANGE UP (ref 7–23)
CALCIUM SERPL-MCNC: 9.2 MG/DL — SIGNIFICANT CHANGE UP (ref 8.4–10.5)
CHLORIDE SERPL-SCNC: 105 MMOL/L — SIGNIFICANT CHANGE UP (ref 98–107)
CO2 SERPL-SCNC: 19 MMOL/L — LOW (ref 22–31)
CREAT SERPL-MCNC: 0.9 MG/DL — SIGNIFICANT CHANGE UP (ref 0.5–1.3)
EGFR: 68 ML/MIN/1.73M2 — SIGNIFICANT CHANGE UP
EOSINOPHIL # BLD AUTO: 0.37 K/UL — SIGNIFICANT CHANGE UP (ref 0–0.5)
EOSINOPHIL NFR BLD AUTO: 5.4 % — SIGNIFICANT CHANGE UP (ref 0–6)
GLUCOSE BLDC GLUCOMTR-MCNC: 135 MG/DL — HIGH (ref 70–99)
GLUCOSE BLDC GLUCOMTR-MCNC: 151 MG/DL — HIGH (ref 70–99)
GLUCOSE SERPL-MCNC: 151 MG/DL — HIGH (ref 70–99)
HCT VFR BLD CALC: 33.8 % — LOW (ref 34.5–45)
HGB BLD-MCNC: 10.8 G/DL — LOW (ref 11.5–15.5)
IANC: 4.23 K/UL — SIGNIFICANT CHANGE UP (ref 1.8–7.4)
IMM GRANULOCYTES NFR BLD AUTO: 0.4 % — SIGNIFICANT CHANGE UP (ref 0–0.9)
LYMPHOCYTES # BLD AUTO: 1.5 K/UL — SIGNIFICANT CHANGE UP (ref 1–3.3)
LYMPHOCYTES # BLD AUTO: 21.8 % — SIGNIFICANT CHANGE UP (ref 13–44)
MAGNESIUM SERPL-MCNC: 1.8 MG/DL — SIGNIFICANT CHANGE UP (ref 1.6–2.6)
MCHC RBC-ENTMCNC: 29 PG — SIGNIFICANT CHANGE UP (ref 27–34)
MCHC RBC-ENTMCNC: 32 GM/DL — SIGNIFICANT CHANGE UP (ref 32–36)
MCV RBC AUTO: 90.9 FL — SIGNIFICANT CHANGE UP (ref 80–100)
MONOCYTES # BLD AUTO: 0.71 K/UL — SIGNIFICANT CHANGE UP (ref 0–0.9)
MONOCYTES NFR BLD AUTO: 10.3 % — SIGNIFICANT CHANGE UP (ref 2–14)
NEUTROPHILS # BLD AUTO: 4.23 K/UL — SIGNIFICANT CHANGE UP (ref 1.8–7.4)
NEUTROPHILS NFR BLD AUTO: 61.5 % — SIGNIFICANT CHANGE UP (ref 43–77)
NRBC # BLD: 0 /100 WBCS — SIGNIFICANT CHANGE UP (ref 0–0)
NRBC # FLD: 0 K/UL — SIGNIFICANT CHANGE UP (ref 0–0)
NT-PROBNP SERPL-SCNC: 91 PG/ML — SIGNIFICANT CHANGE UP
PHOSPHATE SERPL-MCNC: 3.2 MG/DL — SIGNIFICANT CHANGE UP (ref 2.5–4.5)
PLATELET # BLD AUTO: 216 K/UL — SIGNIFICANT CHANGE UP (ref 150–400)
POTASSIUM SERPL-MCNC: 4.7 MMOL/L — SIGNIFICANT CHANGE UP (ref 3.5–5.3)
POTASSIUM SERPL-SCNC: 4.7 MMOL/L — SIGNIFICANT CHANGE UP (ref 3.5–5.3)
RBC # BLD: 3.72 M/UL — LOW (ref 3.8–5.2)
RBC # FLD: 15.6 % — HIGH (ref 10.3–14.5)
SODIUM SERPL-SCNC: 140 MMOL/L — SIGNIFICANT CHANGE UP (ref 135–145)
TROPONIN T, HIGH SENSITIVITY RESULT: 17 NG/L — SIGNIFICANT CHANGE UP
WBC # BLD: 6.88 K/UL — SIGNIFICANT CHANGE UP (ref 3.8–10.5)
WBC # FLD AUTO: 6.88 K/UL — SIGNIFICANT CHANGE UP (ref 3.8–10.5)

## 2024-04-19 PROCEDURE — 99232 SBSQ HOSP IP/OBS MODERATE 35: CPT

## 2024-04-19 PROCEDURE — 93010 ELECTROCARDIOGRAM REPORT: CPT

## 2024-04-19 RX ADMIN — Medication 1 TABLET(S): at 20:27

## 2024-04-19 RX ADMIN — CARBIDOPA AND LEVODOPA 1.5 TABLET(S): 25; 100 TABLET ORAL at 20:26

## 2024-04-19 RX ADMIN — POLYETHYLENE GLYCOL 3350 17 GRAM(S): 17 POWDER, FOR SOLUTION ORAL at 09:43

## 2024-04-19 RX ADMIN — METFORMIN HYDROCHLORIDE 500 MILLIGRAM(S): 850 TABLET ORAL at 20:26

## 2024-04-19 RX ADMIN — Medication 400 UNIT(S): at 09:43

## 2024-04-19 RX ADMIN — SENNA PLUS 2 TABLET(S): 8.6 TABLET ORAL at 20:26

## 2024-04-19 RX ADMIN — FLUPHENAZINE HYDROCHLORIDE 5 MILLIGRAM(S): 1 TABLET, FILM COATED ORAL at 09:43

## 2024-04-19 RX ADMIN — DIVALPROEX SODIUM 750 MILLIGRAM(S): 500 TABLET, DELAYED RELEASE ORAL at 20:25

## 2024-04-19 RX ADMIN — Medication 50 MILLIGRAM(S): at 09:42

## 2024-04-19 RX ADMIN — Medication 1 MILLIGRAM(S): at 20:25

## 2024-04-19 RX ADMIN — QUETIAPINE FUMARATE 75 MILLIGRAM(S): 200 TABLET, FILM COATED ORAL at 09:44

## 2024-04-19 RX ADMIN — DIVALPROEX SODIUM 750 MILLIGRAM(S): 500 TABLET, DELAYED RELEASE ORAL at 09:43

## 2024-04-19 RX ADMIN — Medication 1 MILLIGRAM(S): at 09:45

## 2024-04-19 RX ADMIN — LISINOPRIL 20 MILLIGRAM(S): 2.5 TABLET ORAL at 09:42

## 2024-04-19 RX ADMIN — METFORMIN HYDROCHLORIDE 500 MILLIGRAM(S): 850 TABLET ORAL at 09:43

## 2024-04-19 RX ADMIN — CARBIDOPA AND LEVODOPA 1.5 TABLET(S): 25; 100 TABLET ORAL at 09:43

## 2024-04-19 RX ADMIN — QUETIAPINE FUMARATE 75 MILLIGRAM(S): 200 TABLET, FILM COATED ORAL at 20:27

## 2024-04-19 RX ADMIN — FLUPHENAZINE HYDROCHLORIDE 2.5 MILLIGRAM(S): 1 TABLET, FILM COATED ORAL at 20:27

## 2024-04-19 RX ADMIN — Medication 75 MICROGRAM(S): at 05:58

## 2024-04-19 NOTE — BH INPATIENT PSYCHIATRY PROGRESS NOTE - NSBHFUPINTERVALHXFT_PSY_A_CORE
Patient seen for follow up of psychosis with aggression, chart reviewed, and case discussed with treatment team.  Patient continues to be intermittently agitated and combative, needing prn medications.  She is irritable on approach this morning.  Not answering questions appropriately, speaking tangentially, disorganized, with many paranoid themes. The patient has been more consistent with medications overall, tolerating them well.  She has been visible on the unit, walking the halls.  The patient has been eating and sleeping well.

## 2024-04-19 NOTE — BH INPATIENT PSYCHIATRY PROGRESS NOTE - NSBHCHARTREVIEWVS_PSY_A_CORE FT
Vital Signs Last 24 Hrs  T(C): --  T(F): --  HR: --  BP: 131/78 (04-19-24 @ 07:58) (131/78 - 131/78)  BP(mean): 81 (04-19-24 @ 07:58) (81 - 81)  RR: --  SpO2: --

## 2024-04-19 NOTE — BH INPATIENT PSYCHIATRY PROGRESS NOTE - NSBHMETABOLIC_PSY_ALL_CORE_FT
BMI: BMI (kg/m2): 30.2 (01-15-24 @ 12:52)  HbA1c: A1C with Estimated Average Glucose Result: 7.6 % (01-12-24 @ 12:40)    Glucose: POCT Blood Glucose.: 93 mg/dL (04-16-24 @ 08:36)    BP: 131/78 (04-19-24 @ 07:58) (124/68 - 131/78)Vital Signs Last 24 Hrs  T(C): --  T(F): --  HR: --  BP: 131/78 (04-19-24 @ 07:58) (131/78 - 131/78)  BP(mean): 81 (04-19-24 @ 07:58) (81 - 81)  RR: --  SpO2: --      Lipid Panel:

## 2024-04-19 NOTE — BH INPATIENT PSYCHIATRY PROGRESS NOTE - NSBHASSESSSUMMFT_PSY_ALL_CORE
2/5 Patient showing some improving trend will give more time, if needs more medication titration due to ongoing symptoms will increase Seroquel not haldol  2/6 Improving cont meds, offered prn suppository and simethicone  2/7 Slowly improving will cont current meds, eval need for further increase Seroquel  2/8 Partial response continue meds except will increase Seroquel as was on 250mg/d PTA  2/9 Overall gains, still regresses and becomes difficult to manage will increase Seroquel to 200mg/d in divided dosing  2/12 A little calmer since started on additional mid day, cont other meds w/o change  2/13 Patient better still disorganized, hyper Faith but less agitated, cont current treatment  2/14 Improved globally with some symptom variability. Cont current med consider further increment Seroquel  2/15 Improved cont current regimen for now  2/16 Showing  some gains continue treatment for now at current doses   2/20 Improved, cont current rx. Scheduled urogyn for pessary replacement  2/21 Improved with variable intrusiveness. Cont meds will increase Miralax and Senna  2/22 Improving cont current rx, observe for effect of re-titration of laxatives  2/23 Lost balance mainly due to behavior however to be safe will lower haldol and Klonopin modestly and observe. Otherwise generally manageable  2/24: No PRNs needed overnight. Compliant with meds. As per staff last night pt was banging her Bible against the wall and lost her balance, staff held her. Pt remains talkative and intrusive at times but overall calmer and redirectable.   2/25: remains labile, loud, easily agitated but more redirectable.  2/26 Patient seen by medicine no evidence injury needing imaging, just Tylenol prn. Will cont current meds, if anteroflexion dosesn't improve with recent drop in haldol will consider further dose reduction  2/27 Has had slow increase in EPS despite no increase in dose and some reduction will hold haldol to reduce EPS and restart at lower dose  2/28 Patient with change in med tolerance some agitation and sedation. Will hold haldol tonight consider changing to Prolixin as may be a little better for EPS and will check labs to look for any medical issue  2/29 Labile irritable, increased tremor improved with washout of haldol. Will restart haldol but see if she can be stabilized at low dose to minimize tremor. COuld consider increasing Seroquel  3/1 Improved will see if she can stabilize on Seroquel and low dose haldol to minimize EPS will address nasal symptoms  3/2 Patient better less bent tremor less calmer. Attempt to find dose of haldol that control psychosis agitation with manageable tremor. May need to try 2mg bid as 1mg big may be subtherapeutic  3/4 Some improvement in that less agitated but EPS better will cont with low dose haldol Issues is that both side effects and decompenstion have had long lags related to dose changes so unclear if at haldol 1mg bid she is better EPS wise but will gradually decompensate. Cont to observe consider increase to 1mg tid. Patient's 2PC expiring she remains delusional disorganized not yet able to managed in SNF will apply for further retention  3/5 Showing some improvement with less agitation despite delusions but also less EPS with lowered haldol. Cont meds will reduce klonopin from 0.25mg tid to bid to reduce polypharm cont other meds. If calmer less disorganized will ask PT if she can walker trial  3/6: behavior in better control, delusional, suspected AH. taking meds.   3/7: can become irritable, redirectable. c/w current meds, ambulating hunched over.   3/8: went to interventional Urology on Fri, UA ordered, ultrasound of bladder scheduled for Monday at 10 AM,  and ob gyn for pessary ordered. If blood in urine—send back to urology office for cysto (please see the full consult in chart)  3/9: Somewhat sedated this AM, calm, not agitated, will follow and adjust meds as necessary (clonazepam)   3/10: More alert today, suspicious of examiner, UA negative  3/11: Mistrustfulness remains, leading to irritability with staff; no prns given   3/12: Labile and irritability noted, poor insight and requires continued inpatient psychiatric hospitalization for safety and stabilization.  3/13: labile, irritable on approach, redirectable. c/w current meds. Delusions noted on exam.  3/14: Labile and irritability noted, poor insight and requires continued inpatient psychiatric hospitalization for safety and stabilization.  3/15: labile, irritable on approach, redirectable. c/w current meds.  3/18 doing poor on regimen where haldol on low dose ineffective or causes sig tremor and anterflexion on higher doses. Will stop and review alternatives such as prolixin or risperidone  3/19 Patient with less sedation , still sig trremor. Delusional irritable, in altercation with roommate sees her room as belonging solely to her. Cont off haldol consider second antipsychotic has been on olanzapine, Prolixin, risperidone, she believes has not been on Abilify will consider this as trial with option for SHAFER  3/20 Psychotic irritable disorganized may go back to trial of olanzapine to allow for monotherapy and less EPS. Spoke with cally will lower Valtrex as dose is above recommended for maintenance  3/21 Louder more intrusive. Will titrate olanzapine ands use prns target 20-30mg, may need second antipsychotic given treatment resistance and clozapine not feasible  3/22 Patient sl calmer with po and prn olanzapine still lous intrusive irritable, needing prns. will increase to 5mg tid. Plan when calmer attempt to simplify regimen to bid to improve compliance.  3/23: no PRN overnight/this am. Noted walking in dayroom, calmer, no aggression now.  3/24: s/p fall, no injury, noted walking in dining area, remains hostile, easily agitated and uncooperative.   3/25 Patient loud irritable  but somewhat less disorganized. Seen by PT not assessed as sig Parkinsonized. will cont meds, will try to check VPA level in AM. Feel due to reduced disorganization patient no longer requires  will place on routine checks , will check labs including VPa level in AM  3/26 Modestly better on olanzpine. Cont trial consider increasing to 10mg bid.  Reorder labs when patient more cooperative  3/27 Modest improvement, cont olanzapine but will now move to bid dosing also Depakote and Sinemet will be made bid to decrease number of times per day she needs to be approached for medication  3/28 Modest gains from olanzapine with less EPS than when on haloperidol. COnt treatment will increase to 10mg bid try to obtain VPA level in AM  3/29 Cont olanzapine trial. Considering other options patient refuses li and likely would be hard to follow with labs. Patient feels Abilify helped in past will consider rechallenge with cross taper  3/30 Poor reponse to Zyprexa will change prn to Ativan /fluphenazine consider then standing prolicxin at low dose as only time patient stable was when she was on haldol but then developed EPS at higher dose  3/31 Remains highly agitated. Only time patient better was combo atypical and conventional will add low dose fluphenazine to olanzapine  4/1 Agitated needing prn. Will d/c olanzapine as has been completely ineffective. Will use Prolixin as she appears to respond to conventional antipsychotics but given EPS vulnerability will add Cogentin as there is no other option to manage EPS  4/2 Loud agitated psychotic no worse off olanzapine will increase fluphenazine to 2mg  bid  4/3 Loud, irritable, Faith delusions, very disorganized behavior such as placing self on floors, placing paper crosses on floor. Will increase Prolixin to 2.5mg bid  4/4 Psychotic , agitated disorganized. Tolerating current Prolixin, plan titrate but need to give time on each dose as she tends to have side effects lags.   4/5: irritable, religiously focused, taking medications.   4/8 Modest gains, reduce need for prns since starting prolixin, cont current dose based on level of EPS may increase to 7.5mg/d vs re add Seroquel  4/9 Somewhat better will hold off on adding another med. Will give more time on each dose based that in past she had delayed side effects  4/10 Patient worse today Needing IM will increase Prolixin as no clear indication EPS worse. If not improving with fluphenazine monotherapy consider as above rechallenge with quetiapine add on  4/12: The patient continues to be psychotic, intermittently compliant with medications.  Intermittent agitation, needing prn's.  Continue Prolixin and Seroquel trial  4/15: The patient continues to be psychotic, more compliant with medications overall.  Intermittent agitation continues.  Will increase Seroquel to 50mg bid.  Continue prolixin.  4/16-17: Calmer overall at times, but remains psychotic and disorganized.  Intermittent agitation continues.  Continue Seroquel and Prolixin.  4/18-9: Remains agitated at times, needing prn  Remains psychotic and disorganized.  Increased Seroquel to 75mg bid, tolerating well.

## 2024-04-19 NOTE — BH CHART NOTE - NSEVENTNOTEFT_PSY_ALL_CORE
Patient complaining of chest pain to staff. On interview, not able to provide clear answers regarding symptoms. Most likely dyspepsia but will rule out cardiac etiology with labs and EKG. No abnormalities on exam, patient was eating and then stood to walk - bent down but this is baseline behavior. VSS.

## 2024-04-20 LAB
GLUCOSE BLDC GLUCOMTR-MCNC: 144 MG/DL — HIGH (ref 70–99)
GLUCOSE BLDC GLUCOMTR-MCNC: 225 MG/DL — HIGH (ref 70–99)

## 2024-04-20 RX ADMIN — CARBIDOPA AND LEVODOPA 1.5 TABLET(S): 25; 100 TABLET ORAL at 12:16

## 2024-04-20 RX ADMIN — Medication 75 MICROGRAM(S): at 06:05

## 2024-04-20 RX ADMIN — FLUPHENAZINE HYDROCHLORIDE 5 MILLIGRAM(S): 1 TABLET, FILM COATED ORAL at 12:17

## 2024-04-20 RX ADMIN — METFORMIN HYDROCHLORIDE 500 MILLIGRAM(S): 850 TABLET ORAL at 20:29

## 2024-04-20 RX ADMIN — Medication 400 UNIT(S): at 12:16

## 2024-04-20 RX ADMIN — DIVALPROEX SODIUM 750 MILLIGRAM(S): 500 TABLET, DELAYED RELEASE ORAL at 12:16

## 2024-04-20 RX ADMIN — METFORMIN HYDROCHLORIDE 500 MILLIGRAM(S): 850 TABLET ORAL at 10:07

## 2024-04-20 RX ADMIN — LISINOPRIL 20 MILLIGRAM(S): 2.5 TABLET ORAL at 10:09

## 2024-04-20 RX ADMIN — FLUPHENAZINE HYDROCHLORIDE 2.5 MILLIGRAM(S): 1 TABLET, FILM COATED ORAL at 18:15

## 2024-04-20 RX ADMIN — Medication 1 MILLIGRAM(S): at 20:30

## 2024-04-20 RX ADMIN — Medication 1 TABLET(S): at 20:28

## 2024-04-20 RX ADMIN — Medication 1 MILLIGRAM(S): at 12:15

## 2024-04-20 RX ADMIN — DIVALPROEX SODIUM 750 MILLIGRAM(S): 500 TABLET, DELAYED RELEASE ORAL at 20:28

## 2024-04-20 RX ADMIN — QUETIAPINE FUMARATE 75 MILLIGRAM(S): 200 TABLET, FILM COATED ORAL at 12:17

## 2024-04-20 RX ADMIN — QUETIAPINE FUMARATE 75 MILLIGRAM(S): 200 TABLET, FILM COATED ORAL at 20:29

## 2024-04-20 RX ADMIN — FLUPHENAZINE HYDROCHLORIDE 2.5 MILLIGRAM(S): 1 TABLET, FILM COATED ORAL at 20:29

## 2024-04-20 RX ADMIN — CARBIDOPA AND LEVODOPA 1.5 TABLET(S): 25; 100 TABLET ORAL at 20:27

## 2024-04-20 RX ADMIN — Medication 50 MILLIGRAM(S): at 10:06

## 2024-04-21 LAB
GLUCOSE BLDC GLUCOMTR-MCNC: 171 MG/DL — HIGH (ref 70–99)
GLUCOSE BLDC GLUCOMTR-MCNC: 192 MG/DL — HIGH (ref 70–99)

## 2024-04-21 RX ADMIN — FLUPHENAZINE HYDROCHLORIDE 2.5 MILLIGRAM(S): 1 TABLET, FILM COATED ORAL at 21:08

## 2024-04-21 RX ADMIN — QUETIAPINE FUMARATE 75 MILLIGRAM(S): 200 TABLET, FILM COATED ORAL at 21:07

## 2024-04-21 RX ADMIN — CARBIDOPA AND LEVODOPA 1.5 TABLET(S): 25; 100 TABLET ORAL at 21:06

## 2024-04-21 RX ADMIN — Medication 1 MILLIGRAM(S): at 21:08

## 2024-04-21 RX ADMIN — DIVALPROEX SODIUM 750 MILLIGRAM(S): 500 TABLET, DELAYED RELEASE ORAL at 21:06

## 2024-04-21 RX ADMIN — METFORMIN HYDROCHLORIDE 500 MILLIGRAM(S): 850 TABLET ORAL at 09:32

## 2024-04-21 RX ADMIN — CARBIDOPA AND LEVODOPA 1.5 TABLET(S): 25; 100 TABLET ORAL at 09:32

## 2024-04-21 RX ADMIN — Medication 50 MILLIGRAM(S): at 09:34

## 2024-04-21 RX ADMIN — METFORMIN HYDROCHLORIDE 500 MILLIGRAM(S): 850 TABLET ORAL at 21:06

## 2024-04-21 RX ADMIN — Medication 1 MILLIGRAM(S): at 09:34

## 2024-04-21 RX ADMIN — QUETIAPINE FUMARATE 75 MILLIGRAM(S): 200 TABLET, FILM COATED ORAL at 09:33

## 2024-04-21 RX ADMIN — SENNA PLUS 2 TABLET(S): 8.6 TABLET ORAL at 21:06

## 2024-04-21 RX ADMIN — Medication 75 MICROGRAM(S): at 05:17

## 2024-04-21 RX ADMIN — Medication 400 UNIT(S): at 09:33

## 2024-04-21 RX ADMIN — LISINOPRIL 20 MILLIGRAM(S): 2.5 TABLET ORAL at 09:33

## 2024-04-21 RX ADMIN — Medication 1 TABLET(S): at 21:08

## 2024-04-21 RX ADMIN — FLUPHENAZINE HYDROCHLORIDE 5 MILLIGRAM(S): 1 TABLET, FILM COATED ORAL at 09:34

## 2024-04-21 RX ADMIN — DIVALPROEX SODIUM 750 MILLIGRAM(S): 500 TABLET, DELAYED RELEASE ORAL at 09:30

## 2024-04-22 PROCEDURE — 99232 SBSQ HOSP IP/OBS MODERATE 35: CPT

## 2024-04-22 RX ORDER — FLUPHENAZINE HYDROCHLORIDE 1 MG/1
2 TABLET, FILM COATED ORAL ONCE
Refills: 0 | Status: COMPLETED | OUTPATIENT
Start: 2024-04-22 | End: 2024-04-22

## 2024-04-22 RX ORDER — QUETIAPINE FUMARATE 200 MG/1
100 TABLET, FILM COATED ORAL
Refills: 0 | Status: DISCONTINUED | OUTPATIENT
Start: 2024-04-22 | End: 2024-04-24

## 2024-04-22 RX ADMIN — SENNA PLUS 2 TABLET(S): 8.6 TABLET ORAL at 22:08

## 2024-04-22 RX ADMIN — METFORMIN HYDROCHLORIDE 500 MILLIGRAM(S): 850 TABLET ORAL at 09:36

## 2024-04-22 RX ADMIN — CARBIDOPA AND LEVODOPA 1.5 TABLET(S): 25; 100 TABLET ORAL at 09:35

## 2024-04-22 RX ADMIN — Medication 1 MILLIGRAM(S): at 22:09

## 2024-04-22 RX ADMIN — Medication 50 MILLIGRAM(S): at 09:36

## 2024-04-22 RX ADMIN — DIVALPROEX SODIUM 750 MILLIGRAM(S): 500 TABLET, DELAYED RELEASE ORAL at 09:37

## 2024-04-22 RX ADMIN — FLUPHENAZINE HYDROCHLORIDE 5 MILLIGRAM(S): 1 TABLET, FILM COATED ORAL at 09:35

## 2024-04-22 RX ADMIN — Medication 30 MILLILITER(S): at 13:26

## 2024-04-22 RX ADMIN — QUETIAPINE FUMARATE 100 MILLIGRAM(S): 200 TABLET, FILM COATED ORAL at 22:09

## 2024-04-22 RX ADMIN — LISINOPRIL 20 MILLIGRAM(S): 2.5 TABLET ORAL at 09:36

## 2024-04-22 RX ADMIN — FLUPHENAZINE HYDROCHLORIDE 2 MILLIGRAM(S): 1 TABLET, FILM COATED ORAL at 17:15

## 2024-04-22 RX ADMIN — FLUPHENAZINE HYDROCHLORIDE 2.5 MILLIGRAM(S): 1 TABLET, FILM COATED ORAL at 22:08

## 2024-04-22 RX ADMIN — CARBIDOPA AND LEVODOPA 1.5 TABLET(S): 25; 100 TABLET ORAL at 22:08

## 2024-04-22 RX ADMIN — Medication 400 UNIT(S): at 09:35

## 2024-04-22 RX ADMIN — METFORMIN HYDROCHLORIDE 500 MILLIGRAM(S): 850 TABLET ORAL at 22:09

## 2024-04-22 RX ADMIN — Medication 1 TABLET(S): at 22:08

## 2024-04-22 RX ADMIN — DIVALPROEX SODIUM 750 MILLIGRAM(S): 500 TABLET, DELAYED RELEASE ORAL at 22:09

## 2024-04-22 RX ADMIN — QUETIAPINE FUMARATE 75 MILLIGRAM(S): 200 TABLET, FILM COATED ORAL at 09:36

## 2024-04-22 RX ADMIN — Medication 1 MILLIGRAM(S): at 09:36

## 2024-04-22 RX ADMIN — POLYETHYLENE GLYCOL 3350 17 GRAM(S): 17 POWDER, FOR SOLUTION ORAL at 09:40

## 2024-04-22 NOTE — BH INPATIENT PSYCHIATRY PROGRESS NOTE - NSBHMETABOLIC_PSY_ALL_CORE_FT
BMI: BMI (kg/m2): 30.2 (01-15-24 @ 12:52)  HbA1c: A1C with Estimated Average Glucose Result: 7.6 % (01-12-24 @ 12:40)    Glucose: POCT Blood Glucose.: 192 mg/dL (04-21-24 @ 20:47)    BP: 132/80 (04-22-24 @ 09:34) (132/80 - 132/80)Vital Signs Last 24 Hrs  T(C): --  T(F): --  HR: 78 (04-22-24 @ 09:34) (78 - 78)  BP: 132/80 (04-22-24 @ 09:34) (132/80 - 132/80)  BP(mean): --  RR: 16 (04-22-24 @ 09:34) (16 - 16)  SpO2: --    Orthostatic VS  04-21-24 @ 07:55  Lying BP: --/-- HR: --  Sitting BP: 117/77 HR: 88  Standing BP: 145/85 HR: 90  Site: --  Mode: --    Lipid Panel:

## 2024-04-22 NOTE — BH INPATIENT PSYCHIATRY PROGRESS NOTE - CURRENT MEDICATION
MEDICATIONS  (STANDING):  ammonium lactate 12% Lotion 1 Application(s) Topical two times a day  benztropine 1 milliGRAM(s) Oral two times a day  carbidopa/levodopa  25/100 1.5 Tablet(s) Oral <User Schedule>  cholecalciferol 400 Unit(s) Oral daily  divalproex Sprinkle 750 milliGRAM(s) Oral two times a day  fluPHENAZine 2.5 milliGRAM(s) Oral at bedtime  fluPHENAZine 5 milliGRAM(s) Oral daily  glucagon  Injectable 1 milliGRAM(s) IntraMuscular once  hemorrhoidal Ointment 1 Application(s) Rectal daily  hydrocortisone 2.5% Ointment 1 Application(s) Topical two times a day  insulin lispro (ADMELOG) corrective regimen sliding scale   SubCutaneous three times a day before meals  levothyroxine 75 MICROGram(s) Oral daily  lisinopril 20 milliGRAM(s) Oral daily  metFORMIN 500 milliGRAM(s) Oral two times a day  metoprolol succinate ER 50 milliGRAM(s) Oral daily  multivitamin 1 Tablet(s) Oral at bedtime  polyethylene glycol 3350 17 Gram(s) Oral daily  QUEtiapine 100 milliGRAM(s) Oral two times a day  senna 2 Tablet(s) Oral at bedtime    MEDICATIONS  (PRN):  acetaminophen     Tablet .. 650 milliGRAM(s) Oral every 6 hours PRN Mild Pain (1 - 3), Moderate Pain (4 - 6)  albuterol    90 MICROgram(s) HFA Inhaler 2 Puff(s) Inhalation every 6 hours PRN Shortness of Breath and/or Wheezing  aluminum hydroxide/magnesium hydroxide/simethicone Suspension 30 milliLiter(s) Oral every 6 hours PRN Dyspepsia  bisacodyl Suppository 10 milliGRAM(s) Rectal daily PRN constipation  dextrose Oral Gel 15 Gram(s) Oral once PRN Blood Glucose LESS THAN 70 milliGRAM(s)/deciliter  diphenhydrAMINE Injectable 25 milliGRAM(s) IntraMuscular once PRN agitation  fluPHENAZine 2.5 milliGRAM(s) Oral every 6 hours PRN agitation  fluPHENAZine  Injectable 2 milliGRAM(s) IntraMuscular once PRN agitation  LORazepam   Injectable 1 milliGRAM(s) IntraMuscular once PRN severe agitation  simethicone 80 milliGRAM(s) Chew every 8 hours PRN gas  sodium chloride 0.65% Nasal 2 Spray(s) Both Nostrils every 6 hours PRN congestion

## 2024-04-22 NOTE — BH INPATIENT PSYCHIATRY PROGRESS NOTE - NSBHCHARTREVIEWVS_PSY_A_CORE FT
Vital Signs Last 24 Hrs  T(C): --  T(F): --  HR: 78 (04-22-24 @ 09:34) (78 - 78)  BP: 132/80 (04-22-24 @ 09:34) (132/80 - 132/80)  BP(mean): --  RR: 16 (04-22-24 @ 09:34) (16 - 16)  SpO2: --    Orthostatic VS  04-21-24 @ 07:55  Lying BP: --/-- HR: --  Sitting BP: 117/77 HR: 88  Standing BP: 145/85 HR: 90  Site: --  Mode: --

## 2024-04-22 NOTE — BH INPATIENT PSYCHIATRY PROGRESS NOTE - PRN MEDS
MEDICATIONS  (PRN):  acetaminophen     Tablet .. 650 milliGRAM(s) Oral every 6 hours PRN Mild Pain (1 - 3), Moderate Pain (4 - 6)  albuterol    90 MICROgram(s) HFA Inhaler 2 Puff(s) Inhalation every 6 hours PRN Shortness of Breath and/or Wheezing  aluminum hydroxide/magnesium hydroxide/simethicone Suspension 30 milliLiter(s) Oral every 6 hours PRN Dyspepsia  bisacodyl Suppository 10 milliGRAM(s) Rectal daily PRN constipation  dextrose Oral Gel 15 Gram(s) Oral once PRN Blood Glucose LESS THAN 70 milliGRAM(s)/deciliter  diphenhydrAMINE Injectable 25 milliGRAM(s) IntraMuscular once PRN agitation  fluPHENAZine 2.5 milliGRAM(s) Oral every 6 hours PRN agitation  fluPHENAZine  Injectable 2 milliGRAM(s) IntraMuscular once PRN agitation  LORazepam   Injectable 1 milliGRAM(s) IntraMuscular once PRN severe agitation  simethicone 80 milliGRAM(s) Chew every 8 hours PRN gas  sodium chloride 0.65% Nasal 2 Spray(s) Both Nostrils every 6 hours PRN congestion

## 2024-04-22 NOTE — BH INPATIENT PSYCHIATRY PROGRESS NOTE - MSE UNSTRUCTURED FT
Appearance: casual grooming, poor hygiene;  sig tremor. Walks with anteroflexed gait though no shuffling or cogwheeling  Behavior: Intermittently agitated, makes attempts to be cooperative with interview  Speech: fluent, irritable in tone, normal rate , quite loud  Mood: "not good"  Affect: irritable, angry, labile  Thought process: disorganized, tangential, loose  Thought content: paranoid accusatory denies SI/HI. Anglican delusions, focus on pregnancy.  Perceptual disturbances: no appearance of internal preoccupation  Cognition: adequately oriented  Insight: limited  Judgement: limited

## 2024-04-22 NOTE — BH INPATIENT PSYCHIATRY PROGRESS NOTE - NSBHASSESSSUMMFT_PSY_ALL_CORE
2/5 Patient showing some improving trend will give more time, if needs more medication titration due to ongoing symptoms will increase Seroquel not haldol  2/6 Improving cont meds, offered prn suppository and simethicone  2/7 Slowly improving will cont current meds, eval need for further increase Seroquel  2/8 Partial response continue meds except will increase Seroquel as was on 250mg/d PTA  2/9 Overall gains, still regresses and becomes difficult to manage will increase Seroquel to 200mg/d in divided dosing  2/12 A little calmer since started on additional mid day, cont other meds w/o change  2/13 Patient better still disorganized, hyper Spiritism but less agitated, cont current treatment  2/14 Improved globally with some symptom variability. Cont current med consider further increment Seroquel  2/15 Improved cont current regimen for now  2/16 Showing  some gains continue treatment for now at current doses   2/20 Improved, cont current rx. Scheduled urogyn for pessary replacement  2/21 Improved with variable intrusiveness. Cont meds will increase Miralax and Senna  2/22 Improving cont current rx, observe for effect of re-titration of laxatives  2/23 Lost balance mainly due to behavior however to be safe will lower haldol and Klonopin modestly and observe. Otherwise generally manageable  2/24: No PRNs needed overnight. Compliant with meds. As per staff last night pt was banging her Bible against the wall and lost her balance, staff held her. Pt remains talkative and intrusive at times but overall calmer and redirectable.   2/25: remains labile, loud, easily agitated but more redirectable.  2/26 Patient seen by medicine no evidence injury needing imaging, just Tylenol prn. Will cont current meds, if anteroflexion dosesn't improve with recent drop in haldol will consider further dose reduction  2/27 Has had slow increase in EPS despite no increase in dose and some reduction will hold haldol to reduce EPS and restart at lower dose  2/28 Patient with change in med tolerance some agitation and sedation. Will hold haldol tonight consider changing to Prolixin as may be a little better for EPS and will check labs to look for any medical issue  2/29 Labile irritable, increased tremor improved with washout of haldol. Will restart haldol but see if she can be stabilized at low dose to minimize tremor. COuld consider increasing Seroquel  3/1 Improved will see if she can stabilize on Seroquel and low dose haldol to minimize EPS will address nasal symptoms  3/2 Patient better less bent tremor less calmer. Attempt to find dose of haldol that control psychosis agitation with manageable tremor. May need to try 2mg bid as 1mg big may be subtherapeutic  3/4 Some improvement in that less agitated but EPS better will cont with low dose haldol Issues is that both side effects and decompenstion have had long lags related to dose changes so unclear if at haldol 1mg bid she is better EPS wise but will gradually decompensate. Cont to observe consider increase to 1mg tid. Patient's 2PC expiring she remains delusional disorganized not yet able to managed in SNF will apply for further retention  3/5 Showing some improvement with less agitation despite delusions but also less EPS with lowered haldol. Cont meds will reduce klonopin from 0.25mg tid to bid to reduce polypharm cont other meds. If calmer less disorganized will ask PT if she can walker trial  3/6: behavior in better control, delusional, suspected AH. taking meds.   3/7: can become irritable, redirectable. c/w current meds, ambulating hunched over.   3/8: went to interventional Urology on Fri, UA ordered, ultrasound of bladder scheduled for Monday at 10 AM,  and ob gyn for pessary ordered. If blood in urine—send back to urology office for cysto (please see the full consult in chart)  3/9: Somewhat sedated this AM, calm, not agitated, will follow and adjust meds as necessary (clonazepam)   3/10: More alert today, suspicious of examiner, UA negative  3/11: Mistrustfulness remains, leading to irritability with staff; no prns given   3/12: Labile and irritability noted, poor insight and requires continued inpatient psychiatric hospitalization for safety and stabilization.  3/13: labile, irritable on approach, redirectable. c/w current meds. Delusions noted on exam.  3/14: Labile and irritability noted, poor insight and requires continued inpatient psychiatric hospitalization for safety and stabilization.  3/15: labile, irritable on approach, redirectable. c/w current meds.  3/18 doing poor on regimen where haldol on low dose ineffective or causes sig tremor and anterflexion on higher doses. Will stop and review alternatives such as prolixin or risperidone  3/19 Patient with less sedation , still sig trremor. Delusional irritable, in altercation with roommate sees her room as belonging solely to her. Cont off haldol consider second antipsychotic has been on olanzapine, Prolixin, risperidone, she believes has not been on Abilify will consider this as trial with option for SHAFER  3/20 Psychotic irritable disorganized may go back to trial of olanzapine to allow for monotherapy and less EPS. Spoke with cally will lower Valtrex as dose is above recommended for maintenance  3/21 Louder more intrusive. Will titrate olanzapine ands use prns target 20-30mg, may need second antipsychotic given treatment resistance and clozapine not feasible  3/22 Patient sl calmer with po and prn olanzapine still lous intrusive irritable, needing prns. will increase to 5mg tid. Plan when calmer attempt to simplify regimen to bid to improve compliance.  3/23: no PRN overnight/this am. Noted walking in dayroom, calmer, no aggression now.  3/24: s/p fall, no injury, noted walking in dining area, remains hostile, easily agitated and uncooperative.   3/25 Patient loud irritable  but somewhat less disorganized. Seen by PT not assessed as sig Parkinsonized. will cont meds, will try to check VPA level in AM. Feel due to reduced disorganization patient no longer requires  will place on routine checks , will check labs including VPa level in AM  3/26 Modestly better on olanzpine. Cont trial consider increasing to 10mg bid.  Reorder labs when patient more cooperative  3/27 Modest improvement, cont olanzapine but will now move to bid dosing also Depakote and Sinemet will be made bid to decrease number of times per day she needs to be approached for medication  3/28 Modest gains from olanzapine with less EPS than when on haloperidol. COnt treatment will increase to 10mg bid try to obtain VPA level in AM  3/29 Cont olanzapine trial. Considering other options patient refuses li and likely would be hard to follow with labs. Patient feels Abilify helped in past will consider rechallenge with cross taper  3/30 Poor reponse to Zyprexa will change prn to Ativan /fluphenazine consider then standing prolicxin at low dose as only time patient stable was when she was on haldol but then developed EPS at higher dose  3/31 Remains highly agitated. Only time patient better was combo atypical and conventional will add low dose fluphenazine to olanzapine  4/1 Agitated needing prn. Will d/c olanzapine as has been completely ineffective. Will use Prolixin as she appears to respond to conventional antipsychotics but given EPS vulnerability will add Cogentin as there is no other option to manage EPS  4/2 Loud agitated psychotic no worse off olanzapine will increase fluphenazine to 2mg  bid  4/3 Loud, irritable, Spiritism delusions, very disorganized behavior such as placing self on floors, placing paper crosses on floor. Will increase Prolixin to 2.5mg bid  4/4 Psychotic , agitated disorganized. Tolerating current Prolixin, plan titrate but need to give time on each dose as she tends to have side effects lags.   4/5: irritable, religiously focused, taking medications.   4/8 Modest gains, reduce need for prns since starting prolixin, cont current dose based on level of EPS may increase to 7.5mg/d vs re add Seroquel  4/9 Somewhat better will hold off on adding another med. Will give more time on each dose based that in past she had delayed side effects  4/10 Patient worse today Needing IM will increase Prolixin as no clear indication EPS worse. If not improving with fluphenazine monotherapy consider as above rechallenge with quetiapine add on  4/12: The patient continues to be psychotic, intermittently compliant with medications.  Intermittent agitation, needing prn's.  Continue Prolixin and Seroquel trial  4/15: The patient continues to be psychotic, more compliant with medications overall.  Intermittent agitation continues.  Will increase Seroquel to 50mg bid.  Continue prolixin.  4/16-17: Calmer overall at times, but remains psychotic and disorganized.  Intermittent agitation continues.  Continue Seroquel and Prolixin.  4/18-9: Remains agitated at times, needing prn  Remains psychotic and disorganized.  Increased Seroquel to 75mg bid, tolerating well.  4/21 Remains psychotic agitated some reduction in prns frequency will increase Seroquel

## 2024-04-23 LAB — GLUCOSE BLDC GLUCOMTR-MCNC: 114 MG/DL — HIGH (ref 70–99)

## 2024-04-23 PROCEDURE — 99232 SBSQ HOSP IP/OBS MODERATE 35: CPT

## 2024-04-23 RX ORDER — FLUPHENAZINE HYDROCHLORIDE 1 MG/1
5 TABLET, FILM COATED ORAL
Refills: 0 | Status: DISCONTINUED | OUTPATIENT
Start: 2024-04-23 | End: 2024-05-11

## 2024-04-23 RX ORDER — FLUPHENAZINE HYDROCHLORIDE 1 MG/1
2 TABLET, FILM COATED ORAL ONCE
Refills: 0 | Status: COMPLETED | OUTPATIENT
Start: 2024-04-23 | End: 2024-04-23

## 2024-04-23 RX ADMIN — FLUPHENAZINE HYDROCHLORIDE 2 MILLIGRAM(S): 1 TABLET, FILM COATED ORAL at 07:19

## 2024-04-23 RX ADMIN — FLUPHENAZINE HYDROCHLORIDE 2 MILLIGRAM(S): 1 TABLET, FILM COATED ORAL at 22:24

## 2024-04-23 RX ADMIN — Medication 0: at 09:01

## 2024-04-23 RX ADMIN — Medication 50 MILLIGRAM(S): at 10:02

## 2024-04-23 RX ADMIN — FLUPHENAZINE HYDROCHLORIDE 5 MILLIGRAM(S): 1 TABLET, FILM COATED ORAL at 10:02

## 2024-04-23 RX ADMIN — POLYETHYLENE GLYCOL 3350 17 GRAM(S): 17 POWDER, FOR SOLUTION ORAL at 10:03

## 2024-04-23 RX ADMIN — QUETIAPINE FUMARATE 100 MILLIGRAM(S): 200 TABLET, FILM COATED ORAL at 10:02

## 2024-04-23 RX ADMIN — DIVALPROEX SODIUM 750 MILLIGRAM(S): 500 TABLET, DELAYED RELEASE ORAL at 10:01

## 2024-04-23 RX ADMIN — METFORMIN HYDROCHLORIDE 500 MILLIGRAM(S): 850 TABLET ORAL at 10:02

## 2024-04-23 RX ADMIN — Medication 400 UNIT(S): at 10:03

## 2024-04-23 RX ADMIN — Medication 1 MILLIGRAM(S): at 10:02

## 2024-04-23 RX ADMIN — LISINOPRIL 20 MILLIGRAM(S): 2.5 TABLET ORAL at 10:03

## 2024-04-23 RX ADMIN — CARBIDOPA AND LEVODOPA 1.5 TABLET(S): 25; 100 TABLET ORAL at 10:00

## 2024-04-23 NOTE — BH INPATIENT PSYCHIATRY PROGRESS NOTE - MSE UNSTRUCTURED FT
Appearance: casual grooming, poor hygiene;  sig tremor. Walks with anteroflexed  though less gait though no shuffling or cogwheeling  Behavior: Intermittently agitated, makes attempts to be cooperative with interview  Speech: fluent, irritable in tone, normal rate , quite loud  Mood: "not good"  Affect: irritable, angry, labile  Thought process: disorganized, tangential, loose  Thought content: paranoid accusatory denies SI/HI. Jainism delusions, focus on pregnancy. Sexaul pre-occupation  Perceptual disturbances: no appearance of internal preoccupation  Cognition: adequately oriented  Insight: limited  Judgement: limited

## 2024-04-23 NOTE — BH INPATIENT PSYCHIATRY PROGRESS NOTE - NSBHMETABOLIC_PSY_ALL_CORE_FT
BMI: BMI (kg/m2): 30.2 (01-15-24 @ 12:52)  HbA1c: A1C with Estimated Average Glucose Result: 7.6 % (01-12-24 @ 12:40)    Glucose: POCT Blood Glucose.: 114 mg/dL (04-23-24 @ 08:24)    BP: 113/76 (04-23-24 @ 09:28) (113/76 - 132/80)Vital Signs Last 24 Hrs  T(C): --  T(F): --  HR: 79 (04-23-24 @ 09:28) (79 - 79)  BP: 113/76 (04-23-24 @ 09:28) (113/76 - 113/76)  BP(mean): --  RR: --  SpO2: --      Lipid Panel:

## 2024-04-23 NOTE — BH INPATIENT PSYCHIATRY PROGRESS NOTE - NSBHCHARTREVIEWVS_PSY_A_CORE FT
Vital Signs Last 24 Hrs  T(C): --  T(F): --  HR: 79 (04-23-24 @ 09:28) (79 - 79)  BP: 113/76 (04-23-24 @ 09:28) (113/76 - 113/76)  BP(mean): --  RR: --  SpO2: --

## 2024-04-23 NOTE — BH INPATIENT PSYCHIATRY PROGRESS NOTE - CURRENT MEDICATION
MEDICATIONS  (STANDING):  ammonium lactate 12% Lotion 1 Application(s) Topical two times a day  benztropine 1 milliGRAM(s) Oral two times a day  carbidopa/levodopa  25/100 1.5 Tablet(s) Oral <User Schedule>  cholecalciferol 400 Unit(s) Oral daily  divalproex Sprinkle 750 milliGRAM(s) Oral two times a day  fluPHENAZine 5 milliGRAM(s) Oral two times a day  glucagon  Injectable 1 milliGRAM(s) IntraMuscular once  hemorrhoidal Ointment 1 Application(s) Rectal daily  hydrocortisone 2.5% Ointment 1 Application(s) Topical two times a day  insulin lispro (ADMELOG) corrective regimen sliding scale   SubCutaneous three times a day before meals  levothyroxine 75 MICROGram(s) Oral daily  lisinopril 20 milliGRAM(s) Oral daily  metFORMIN 500 milliGRAM(s) Oral two times a day  metoprolol succinate ER 50 milliGRAM(s) Oral daily  multivitamin 1 Tablet(s) Oral at bedtime  polyethylene glycol 3350 17 Gram(s) Oral daily  QUEtiapine 100 milliGRAM(s) Oral two times a day  senna 2 Tablet(s) Oral at bedtime    MEDICATIONS  (PRN):  acetaminophen     Tablet .. 650 milliGRAM(s) Oral every 6 hours PRN Mild Pain (1 - 3), Moderate Pain (4 - 6)  albuterol    90 MICROgram(s) HFA Inhaler 2 Puff(s) Inhalation every 6 hours PRN Shortness of Breath and/or Wheezing  aluminum hydroxide/magnesium hydroxide/simethicone Suspension 30 milliLiter(s) Oral every 6 hours PRN Dyspepsia  bisacodyl Suppository 10 milliGRAM(s) Rectal daily PRN constipation  dextrose Oral Gel 15 Gram(s) Oral once PRN Blood Glucose LESS THAN 70 milliGRAM(s)/deciliter  diphenhydrAMINE Injectable 25 milliGRAM(s) IntraMuscular once PRN agitation  fluPHENAZine 2.5 milliGRAM(s) Oral every 6 hours PRN agitation  fluPHENAZine  Injectable 2 milliGRAM(s) IntraMuscular once PRN agitation  simethicone 80 milliGRAM(s) Chew every 8 hours PRN gas  sodium chloride 0.65% Nasal 2 Spray(s) Both Nostrils every 6 hours PRN congestion

## 2024-04-23 NOTE — BH INPATIENT PSYCHIATRY PROGRESS NOTE - NSBHFUPINTERVALHXFT_PSY_A_CORE
Patient seen for follow up of psychosis with aggression, chart reviewed, and case discussed with treatment team.  Patient continues to be intermittently agitated and combative, needing prn medications.  She is hypersexual such as taking off adult diaper around male staff. Eating, sleeping well. VSS

## 2024-04-23 NOTE — BH INPATIENT PSYCHIATRY PROGRESS NOTE - NSBHASSESSSUMMFT_PSY_ALL_CORE
2/5 Patient showing some improving trend will give more time, if needs more medication titration due to ongoing symptoms will increase Seroquel not haldol  2/6 Improving cont meds, offered prn suppository and simethicone  2/7 Slowly improving will cont current meds, eval need for further increase Seroquel  2/8 Partial response continue meds except will increase Seroquel as was on 250mg/d PTA  2/9 Overall gains, still regresses and becomes difficult to manage will increase Seroquel to 200mg/d in divided dosing  2/12 A little calmer since started on additional mid day, cont other meds w/o change  2/13 Patient better still disorganized, hyper Anglican but less agitated, cont current treatment  2/14 Improved globally with some symptom variability. Cont current med consider further increment Seroquel  2/15 Improved cont current regimen for now  2/16 Showing  some gains continue treatment for now at current doses   2/20 Improved, cont current rx. Scheduled urogyn for pessary replacement  2/21 Improved with variable intrusiveness. Cont meds will increase Miralax and Senna  2/22 Improving cont current rx, observe for effect of re-titration of laxatives  2/23 Lost balance mainly due to behavior however to be safe will lower haldol and Klonopin modestly and observe. Otherwise generally manageable  2/24: No PRNs needed overnight. Compliant with meds. As per staff last night pt was banging her Bible against the wall and lost her balance, staff held her. Pt remains talkative and intrusive at times but overall calmer and redirectable.   2/25: remains labile, loud, easily agitated but more redirectable.  2/26 Patient seen by medicine no evidence injury needing imaging, just Tylenol prn. Will cont current meds, if anteroflexion dosesn't improve with recent drop in haldol will consider further dose reduction  2/27 Has had slow increase in EPS despite no increase in dose and some reduction will hold haldol to reduce EPS and restart at lower dose  2/28 Patient with change in med tolerance some agitation and sedation. Will hold haldol tonight consider changing to Prolixin as may be a little better for EPS and will check labs to look for any medical issue  2/29 Labile irritable, increased tremor improved with washout of haldol. Will restart haldol but see if she can be stabilized at low dose to minimize tremor. COuld consider increasing Seroquel  3/1 Improved will see if she can stabilize on Seroquel and low dose haldol to minimize EPS will address nasal symptoms  3/2 Patient better less bent tremor less calmer. Attempt to find dose of haldol that control psychosis agitation with manageable tremor. May need to try 2mg bid as 1mg big may be subtherapeutic  3/4 Some improvement in that less agitated but EPS better will cont with low dose haldol Issues is that both side effects and decompenstion have had long lags related to dose changes so unclear if at haldol 1mg bid she is better EPS wise but will gradually decompensate. Cont to observe consider increase to 1mg tid. Patient's 2PC expiring she remains delusional disorganized not yet able to managed in SNF will apply for further retention  3/5 Showing some improvement with less agitation despite delusions but also less EPS with lowered haldol. Cont meds will reduce klonopin from 0.25mg tid to bid to reduce polypharm cont other meds. If calmer less disorganized will ask PT if she can walker trial  3/6: behavior in better control, delusional, suspected AH. taking meds.   3/7: can become irritable, redirectable. c/w current meds, ambulating hunched over.   3/8: went to interventional Urology on Fri, UA ordered, ultrasound of bladder scheduled for Monday at 10 AM,  and ob gyn for pessary ordered. If blood in urine—send back to urology office for cysto (please see the full consult in chart)  3/9: Somewhat sedated this AM, calm, not agitated, will follow and adjust meds as necessary (clonazepam)   3/10: More alert today, suspicious of examiner, UA negative  3/11: Mistrustfulness remains, leading to irritability with staff; no prns given   3/12: Labile and irritability noted, poor insight and requires continued inpatient psychiatric hospitalization for safety and stabilization.  3/13: labile, irritable on approach, redirectable. c/w current meds. Delusions noted on exam.  3/14: Labile and irritability noted, poor insight and requires continued inpatient psychiatric hospitalization for safety and stabilization.  3/15: labile, irritable on approach, redirectable. c/w current meds.  3/18 doing poor on regimen where haldol on low dose ineffective or causes sig tremor and anterflexion on higher doses. Will stop and review alternatives such as prolixin or risperidone  3/19 Patient with less sedation , still sig trremor. Delusional irritable, in altercation with roommate sees her room as belonging solely to her. Cont off haldol consider second antipsychotic has been on olanzapine, Prolixin, risperidone, she believes has not been on Abilify will consider this as trial with option for SHAFER  3/20 Psychotic irritable disorganized may go back to trial of olanzapine to allow for monotherapy and less EPS. Spoke with cally will lower Valtrex as dose is above recommended for maintenance  3/21 Louder more intrusive. Will titrate olanzapine ands use prns target 20-30mg, may need second antipsychotic given treatment resistance and clozapine not feasible  3/22 Patient sl calmer with po and prn olanzapine still lous intrusive irritable, needing prns. will increase to 5mg tid. Plan when calmer attempt to simplify regimen to bid to improve compliance.  3/23: no PRN overnight/this am. Noted walking in dayroom, calmer, no aggression now.  3/24: s/p fall, no injury, noted walking in dining area, remains hostile, easily agitated and uncooperative.   3/25 Patient loud irritable  but somewhat less disorganized. Seen by PT not assessed as sig Parkinsonized. will cont meds, will try to check VPA level in AM. Feel due to reduced disorganization patient no longer requires  will place on routine checks , will check labs including VPa level in AM  3/26 Modestly better on olanzpine. Cont trial consider increasing to 10mg bid.  Reorder labs when patient more cooperative  3/27 Modest improvement, cont olanzapine but will now move to bid dosing also Depakote and Sinemet will be made bid to decrease number of times per day she needs to be approached for medication  3/28 Modest gains from olanzapine with less EPS than when on haloperidol. COnt treatment will increase to 10mg bid try to obtain VPA level in AM  3/29 Cont olanzapine trial. Considering other options patient refuses li and likely would be hard to follow with labs. Patient feels Abilify helped in past will consider rechallenge with cross taper  3/30 Poor reponse to Zyprexa will change prn to Ativan /fluphenazine consider then standing prolicxin at low dose as only time patient stable was when she was on haldol but then developed EPS at higher dose  3/31 Remains highly agitated. Only time patient better was combo atypical and conventional will add low dose fluphenazine to olanzapine  4/1 Agitated needing prn. Will d/c olanzapine as has been completely ineffective. Will use Prolixin as she appears to respond to conventional antipsychotics but given EPS vulnerability will add Cogentin as there is no other option to manage EPS  4/2 Loud agitated psychotic no worse off olanzapine will increase fluphenazine to 2mg  bid  4/3 Loud, irritable, Anglican delusions, very disorganized behavior such as placing self on floors, placing paper crosses on floor. Will increase Prolixin to 2.5mg bid  4/4 Psychotic , agitated disorganized. Tolerating current Prolixin, plan titrate but need to give time on each dose as she tends to have side effects lags.   4/5: irritable, religiously focused, taking medications.   4/8 Modest gains, reduce need for prns since starting prolixin, cont current dose based on level of EPS may increase to 7.5mg/d vs re add Seroquel  4/9 Somewhat better will hold off on adding another med. Will give more time on each dose based that in past she had delayed side effects  4/10 Patient worse today Needing IM will increase Prolixin as no clear indication EPS worse. If not improving with fluphenazine monotherapy consider as above rechallenge with quetiapine add on  4/12: The patient continues to be psychotic, intermittently compliant with medications.  Intermittent agitation, needing prn's.  Continue Prolixin and Seroquel trial  4/15: The patient continues to be psychotic, more compliant with medications overall.  Intermittent agitation continues.  Will increase Seroquel to 50mg bid.  Continue prolixin.  4/16-17: Calmer overall at times, but remains psychotic and disorganized.  Intermittent agitation continues.  Continue Seroquel and Prolixin.  4/18-9: Remains agitated at times, needing prn  Remains psychotic and disorganized.  Increased Seroquel to 75mg bid, tolerating well.  4/21 Remains psychotic agitated some reduction in prns frequency will increase Seroquel  4/23 psychotic agitated irritable, hypersexual will increase Prolixin. If not improving on current regimen consider adding lithium as patient has manic component

## 2024-04-24 LAB — GLUCOSE BLDC GLUCOMTR-MCNC: 216 MG/DL — HIGH (ref 70–99)

## 2024-04-24 PROCEDURE — 99232 SBSQ HOSP IP/OBS MODERATE 35: CPT

## 2024-04-24 RX ORDER — QUETIAPINE FUMARATE 200 MG/1
125 TABLET, FILM COATED ORAL
Refills: 0 | Status: DISCONTINUED | OUTPATIENT
Start: 2024-04-24 | End: 2024-04-26

## 2024-04-24 RX ADMIN — QUETIAPINE FUMARATE 100 MILLIGRAM(S): 200 TABLET, FILM COATED ORAL at 11:21

## 2024-04-24 RX ADMIN — FLUPHENAZINE HYDROCHLORIDE 5 MILLIGRAM(S): 1 TABLET, FILM COATED ORAL at 22:48

## 2024-04-24 RX ADMIN — SENNA PLUS 2 TABLET(S): 8.6 TABLET ORAL at 22:48

## 2024-04-24 RX ADMIN — Medication 1 MILLIGRAM(S): at 22:48

## 2024-04-24 RX ADMIN — CARBIDOPA AND LEVODOPA 1.5 TABLET(S): 25; 100 TABLET ORAL at 11:20

## 2024-04-24 RX ADMIN — Medication 75 MICROGRAM(S): at 06:32

## 2024-04-24 RX ADMIN — FLUPHENAZINE HYDROCHLORIDE 5 MILLIGRAM(S): 1 TABLET, FILM COATED ORAL at 11:21

## 2024-04-24 RX ADMIN — Medication 1 MILLIGRAM(S): at 11:20

## 2024-04-24 RX ADMIN — Medication 1 TABLET(S): at 22:48

## 2024-04-24 RX ADMIN — DIVALPROEX SODIUM 750 MILLIGRAM(S): 500 TABLET, DELAYED RELEASE ORAL at 22:47

## 2024-04-24 RX ADMIN — METFORMIN HYDROCHLORIDE 500 MILLIGRAM(S): 850 TABLET ORAL at 22:47

## 2024-04-24 RX ADMIN — QUETIAPINE FUMARATE 125 MILLIGRAM(S): 200 TABLET, FILM COATED ORAL at 22:49

## 2024-04-24 RX ADMIN — DIVALPROEX SODIUM 750 MILLIGRAM(S): 500 TABLET, DELAYED RELEASE ORAL at 11:21
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
## 2024-04-24 NOTE — BH INPATIENT PSYCHIATRY PROGRESS NOTE - CURRENT MEDICATION
MEDICATIONS  (STANDING):  ammonium lactate 12% Lotion 1 Application(s) Topical two times a day  benztropine 1 milliGRAM(s) Oral two times a day  carbidopa/levodopa  25/100 1.5 Tablet(s) Oral <User Schedule>  cholecalciferol 400 Unit(s) Oral daily  divalproex Sprinkle 750 milliGRAM(s) Oral two times a day  fluPHENAZine 5 milliGRAM(s) Oral two times a day  glucagon  Injectable 1 milliGRAM(s) IntraMuscular once  hemorrhoidal Ointment 1 Application(s) Rectal daily  hydrocortisone 2.5% Ointment 1 Application(s) Topical two times a day  insulin lispro (ADMELOG) corrective regimen sliding scale   SubCutaneous three times a day before meals  levothyroxine 75 MICROGram(s) Oral daily  lisinopril 20 milliGRAM(s) Oral daily  metFORMIN 500 milliGRAM(s) Oral two times a day  metoprolol succinate ER 50 milliGRAM(s) Oral daily  multivitamin 1 Tablet(s) Oral at bedtime  polyethylene glycol 3350 17 Gram(s) Oral daily  QUEtiapine 125 milliGRAM(s) Oral two times a day  senna 2 Tablet(s) Oral at bedtime    MEDICATIONS  (PRN):  acetaminophen     Tablet .. 650 milliGRAM(s) Oral every 6 hours PRN Mild Pain (1 - 3), Moderate Pain (4 - 6)  albuterol    90 MICROgram(s) HFA Inhaler 2 Puff(s) Inhalation every 6 hours PRN Shortness of Breath and/or Wheezing  aluminum hydroxide/magnesium hydroxide/simethicone Suspension 30 milliLiter(s) Oral every 6 hours PRN Dyspepsia  bisacodyl Suppository 10 milliGRAM(s) Rectal daily PRN constipation  dextrose Oral Gel 15 Gram(s) Oral once PRN Blood Glucose LESS THAN 70 milliGRAM(s)/deciliter  diphenhydrAMINE Injectable 25 milliGRAM(s) IntraMuscular once PRN agitation  fluPHENAZine 2.5 milliGRAM(s) Oral every 6 hours PRN agitation  fluPHENAZine  Injectable 2 milliGRAM(s) IntraMuscular once PRN agitation  simethicone 80 milliGRAM(s) Chew every 8 hours PRN gas  sodium chloride 0.65% Nasal 2 Spray(s) Both Nostrils every 6 hours PRN congestion

## 2024-04-24 NOTE — BH INPATIENT PSYCHIATRY PROGRESS NOTE - NSBHMETABOLIC_PSY_ALL_CORE_FT
BMI: BMI (kg/m2): 30.2 (01-15-24 @ 12:52)  HbA1c: A1C with Estimated Average Glucose Result: 7.6 % (01-12-24 @ 12:40)    Glucose: POCT Blood Glucose.: 216 mg/dL (04-24-24 @ 12:17)    BP: 113/76 (04-23-24 @ 09:28) (113/76 - 132/80)Vital Signs Last 24 Hrs  T(C): --  T(F): --  HR: --  BP: --  BP(mean): --  RR: --  SpO2: --      Lipid Panel:

## 2024-04-24 NOTE — BH INPATIENT PSYCHIATRY PROGRESS NOTE - MSE UNSTRUCTURED FT
Appearance: casual grooming, poor hygiene;  sig tremor. Walks with anteroflexed  though less gait though no shuffling or cogwheeling  Behavior: Intermittently agitated, makes attempts to be cooperative with interview  Speech: fluent, irritable in tone, normal rate , quite loud  Mood: "not good"  Affect: irritable, angry, labile  Thought process: disorganized, tangential, loose  Thought content: paranoid accusatory denies SI/HI. Moravian delusions, focus on pregnancy. Sexaul pre-occupation  Perceptual disturbances: no appearance of internal preoccupation  Cognition: adequately oriented  Insight: limited  Judgement: limited
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
## 2024-04-25 LAB
GLUCOSE BLDC GLUCOMTR-MCNC: 110 MG/DL — HIGH (ref 70–99)
GLUCOSE BLDC GLUCOMTR-MCNC: 137 MG/DL — HIGH (ref 70–99)

## 2024-04-25 PROCEDURE — 99231 SBSQ HOSP IP/OBS SF/LOW 25: CPT

## 2024-04-25 RX ADMIN — Medication 1 MILLIGRAM(S): at 20:32

## 2024-04-25 RX ADMIN — Medication 50 MILLIGRAM(S): at 15:59

## 2024-04-25 RX ADMIN — QUETIAPINE FUMARATE 125 MILLIGRAM(S): 200 TABLET, FILM COATED ORAL at 20:32

## 2024-04-25 RX ADMIN — METFORMIN HYDROCHLORIDE 500 MILLIGRAM(S): 850 TABLET ORAL at 15:58

## 2024-04-25 RX ADMIN — Medication 30 MILLILITER(S): at 21:47

## 2024-04-25 RX ADMIN — CARBIDOPA AND LEVODOPA 1.5 TABLET(S): 25; 100 TABLET ORAL at 15:55

## 2024-04-25 RX ADMIN — FLUPHENAZINE HYDROCHLORIDE 5 MILLIGRAM(S): 1 TABLET, FILM COATED ORAL at 20:32

## 2024-04-25 RX ADMIN — Medication 1 MILLIGRAM(S): at 15:54

## 2024-04-25 RX ADMIN — DIVALPROEX SODIUM 750 MILLIGRAM(S): 500 TABLET, DELAYED RELEASE ORAL at 20:33

## 2024-04-25 RX ADMIN — Medication 1 TABLET(S): at 20:32

## 2024-04-25 RX ADMIN — CARBIDOPA AND LEVODOPA 1.5 TABLET(S): 25; 100 TABLET ORAL at 20:33

## 2024-04-25 RX ADMIN — METFORMIN HYDROCHLORIDE 500 MILLIGRAM(S): 850 TABLET ORAL at 20:32

## 2024-04-25 RX ADMIN — LISINOPRIL 20 MILLIGRAM(S): 2.5 TABLET ORAL at 15:58

## 2024-04-25 RX ADMIN — SENNA PLUS 2 TABLET(S): 8.6 TABLET ORAL at 20:32

## 2024-04-25 RX ADMIN — DIVALPROEX SODIUM 750 MILLIGRAM(S): 500 TABLET, DELAYED RELEASE ORAL at 15:57

## 2024-04-25 RX ADMIN — Medication 400 UNIT(S): at 15:57

## 2024-04-25 NOTE — BH INPATIENT PSYCHIATRY PROGRESS NOTE - MSE UNSTRUCTURED FT
Appearance: casual grooming, poor hygiene;  sig tremor. Walks with anteroflexed  though less gait though no shuffling or cogwheeling  Behavior: Intermittently agitated, makes attempts to be cooperative with interview  Speech: fluent, irritable in tone, normal rate , quite loud  Mood: "not good"  Affect: irritable, angry, labile  Thought process: disorganized, tangential, loose  Thought content: paranoid accusatory denies SI/HI. Druze delusions, focus on pregnancy. Sexaul pre-occupation  Perceptual disturbances: no appearance of internal preoccupation  Cognition: adequately oriented  Insight: limited  Judgement: limited

## 2024-04-25 NOTE — BH INPATIENT PSYCHIATRY PROGRESS NOTE - NSBHFUPINTERVALHXFT_PSY_A_CORE
Patient seen for follow up of psychosis with aggression, chart reviewed, and case discussed with treatment team. Pt refused vitals this morning again. Fingerstick 110 this morning.  Patient continues to be intermittently agitated and combative, needing prn medications on occasion. No PRNs overnight. Pt refused all of her medications this morning. Tenuous, intrusive, loud and threatening to her peers. Eating, sleeping well.

## 2024-04-25 NOTE — BH INPATIENT PSYCHIATRY PROGRESS NOTE - NSBHMETABOLIC_PSY_ALL_CORE_FT
BMI: BMI (kg/m2): 30.2 (01-15-24 @ 12:52)  HbA1c: A1C with Estimated Average Glucose Result: 7.6 % (01-12-24 @ 12:40)    Glucose: POCT Blood Glucose.: 137 mg/dL (04-25-24 @ 12:18)    BP: 113/76 (04-23-24 @ 09:28) (113/76 - 113/76)Vital Signs Last 24 Hrs  T(C): --  T(F): --  HR: --  BP: --  BP(mean): --  RR: --  SpO2: --      Lipid Panel:
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
## 2024-04-26 PROCEDURE — 99232 SBSQ HOSP IP/OBS MODERATE 35: CPT

## 2024-04-26 RX ORDER — FLUPHENAZINE HYDROCHLORIDE 1 MG/1
5 TABLET, FILM COATED ORAL ONCE
Refills: 0 | Status: COMPLETED | OUTPATIENT
Start: 2024-04-26 | End: 2024-04-26

## 2024-04-26 RX ORDER — QUETIAPINE FUMARATE 200 MG/1
150 TABLET, FILM COATED ORAL
Refills: 0 | Status: DISCONTINUED | OUTPATIENT
Start: 2024-04-26 | End: 2024-04-28

## 2024-04-26 RX ADMIN — FLUPHENAZINE HYDROCHLORIDE 5 MILLIGRAM(S): 1 TABLET, FILM COATED ORAL at 22:08

## 2024-04-26 RX ADMIN — Medication 75 MICROGRAM(S): at 06:07

## 2024-04-26 RX ADMIN — FLUPHENAZINE HYDROCHLORIDE 5 MILLIGRAM(S): 1 TABLET, FILM COATED ORAL at 16:16

## 2024-04-26 NOTE — BH INPATIENT PSYCHIATRY PROGRESS NOTE - NSBHASSESSSUMMFT_PSY_ALL_CORE
2/5 Patient showing some improving trend will give more time, if needs more medication titration due to ongoing symptoms will increase Seroquel not haldol  2/6 Improving cont meds, offered prn suppository and simethicone  2/7 Slowly improving will cont current meds, eval need for further increase Seroquel  2/8 Partial response continue meds except will increase Seroquel as was on 250mg/d PTA  2/9 Overall gains, still regresses and becomes difficult to manage will increase Seroquel to 200mg/d in divided dosing  2/12 A little calmer since started on additional mid day, cont other meds w/o change  2/13 Patient better still disorganized, hyper Scientologist but less agitated, cont current treatment  2/14 Improved globally with some symptom variability. Cont current med consider further increment Seroquel  2/15 Improved cont current regimen for now  2/16 Showing  some gains continue treatment for now at current doses   2/20 Improved, cont current rx. Scheduled urogyn for pessary replacement  2/21 Improved with variable intrusiveness. Cont meds will increase Miralax and Senna  2/22 Improving cont current rx, observe for effect of re-titration of laxatives  2/23 Lost balance mainly due to behavior however to be safe will lower haldol and Klonopin modestly and observe. Otherwise generally manageable  2/24: No PRNs needed overnight. Compliant with meds. As per staff last night pt was banging her Bible against the wall and lost her balance, staff held her. Pt remains talkative and intrusive at times but overall calmer and redirectable.   2/25: remains labile, loud, easily agitated but more redirectable.  2/26 Patient seen by medicine no evidence injury needing imaging, just Tylenol prn. Will cont current meds, if anteroflexion dosesn't improve with recent drop in haldol will consider further dose reduction  2/27 Has had slow increase in EPS despite no increase in dose and some reduction will hold haldol to reduce EPS and restart at lower dose  2/28 Patient with change in med tolerance some agitation and sedation. Will hold haldol tonight consider changing to Prolixin as may be a little better for EPS and will check labs to look for any medical issue  2/29 Labile irritable, increased tremor improved with washout of haldol. Will restart haldol but see if she can be stabilized at low dose to minimize tremor. COuld consider increasing Seroquel  3/1 Improved will see if she can stabilize on Seroquel and low dose haldol to minimize EPS will address nasal symptoms  3/2 Patient better less bent tremor less calmer. Attempt to find dose of haldol that control psychosis agitation with manageable tremor. May need to try 2mg bid as 1mg big may be subtherapeutic  3/4 Some improvement in that less agitated but EPS better will cont with low dose haldol Issues is that both side effects and decompenstion have had long lags related to dose changes so unclear if at haldol 1mg bid she is better EPS wise but will gradually decompensate. Cont to observe consider increase to 1mg tid. Patient's 2PC expiring she remains delusional disorganized not yet able to managed in SNF will apply for further retention  3/5 Showing some improvement with less agitation despite delusions but also less EPS with lowered haldol. Cont meds will reduce klonopin from 0.25mg tid to bid to reduce polypharm cont other meds. If calmer less disorganized will ask PT if she can walker trial  3/6: behavior in better control, delusional, suspected AH. taking meds.   3/7: can become irritable, redirectable. c/w current meds, ambulating hunched over.   3/8: went to interventional Urology on Fri, UA ordered, ultrasound of bladder scheduled for Monday at 10 AM,  and ob gyn for pessary ordered. If blood in urine—send back to urology office for cysto (please see the full consult in chart)  3/9: Somewhat sedated this AM, calm, not agitated, will follow and adjust meds as necessary (clonazepam)   3/10: More alert today, suspicious of examiner, UA negative  3/11: Mistrustfulness remains, leading to irritability with staff; no prns given   3/12: Labile and irritability noted, poor insight and requires continued inpatient psychiatric hospitalization for safety and stabilization.  3/13: labile, irritable on approach, redirectable. c/w current meds. Delusions noted on exam.  3/14: Labile and irritability noted, poor insight and requires continued inpatient psychiatric hospitalization for safety and stabilization.  3/15: labile, irritable on approach, redirectable. c/w current meds.  3/18 doing poor on regimen where haldol on low dose ineffective or causes sig tremor and anterflexion on higher doses. Will stop and review alternatives such as prolixin or risperidone  3/19 Patient with less sedation , still sig trremor. Delusional irritable, in altercation with roommate sees her room as belonging solely to her. Cont off haldol consider second antipsychotic has been on olanzapine, Prolixin, risperidone, she believes has not been on Abilify will consider this as trial with option for SHAFER  3/20 Psychotic irritable disorganized may go back to trial of olanzapine to allow for monotherapy and less EPS. Spoke with cally will lower Valtrex as dose is above recommended for maintenance  3/21 Louder more intrusive. Will titrate olanzapine ands use prns target 20-30mg, may need second antipsychotic given treatment resistance and clozapine not feasible  3/22 Patient sl calmer with po and prn olanzapine still lous intrusive irritable, needing prns. will increase to 5mg tid. Plan when calmer attempt to simplify regimen to bid to improve compliance.  3/23: no PRN overnight/this am. Noted walking in dayroom, calmer, no aggression now.  3/24: s/p fall, no injury, noted walking in dining area, remains hostile, easily agitated and uncooperative.   3/25 Patient loud irritable  but somewhat less disorganized. Seen by PT not assessed as sig Parkinsonized. will cont meds, will try to check VPA level in AM. Feel due to reduced disorganization patient no longer requires  will place on routine checks , will check labs including VPa level in AM  3/26 Modestly better on olanzpine. Cont trial consider increasing to 10mg bid.  Reorder labs when patient more cooperative  3/27 Modest improvement, cont olanzapine but will now move to bid dosing also Depakote and Sinemet will be made bid to decrease number of times per day she needs to be approached for medication  3/28 Modest gains from olanzapine with less EPS than when on haloperidol. COnt treatment will increase to 10mg bid try to obtain VPA level in AM  3/29 Cont olanzapine trial. Considering other options patient refuses li and likely would be hard to follow with labs. Patient feels Abilify helped in past will consider rechallenge with cross taper  3/30 Poor reponse to Zyprexa will change prn to Ativan /fluphenazine consider then standing prolicxin at low dose as only time patient stable was when she was on haldol but then developed EPS at higher dose  3/31 Remains highly agitated. Only time patient better was combo atypical and conventional will add low dose fluphenazine to olanzapine  4/1 Agitated needing prn. Will d/c olanzapine as has been completely ineffective. Will use Prolixin as she appears to respond to conventional antipsychotics but given EPS vulnerability will add Cogentin as there is no other option to manage EPS  4/2 Loud agitated psychotic no worse off olanzapine will increase fluphenazine to 2mg  bid  4/3 Loud, irritable, Scientologist delusions, very disorganized behavior such as placing self on floors, placing paper crosses on floor. Will increase Prolixin to 2.5mg bid  4/4 Psychotic , agitated disorganized. Tolerating current Prolixin, plan titrate but need to give time on each dose as she tends to have side effects lags.   4/5: irritable, religiously focused, taking medications.   4/8 Modest gains, reduce need for prns since starting prolixin, cont current dose based on level of EPS may increase to 7.5mg/d vs re add Seroquel  4/9 Somewhat better will hold off on adding another med. Will give more time on each dose based that in past she had delayed side effects  4/10 Patient worse today Needing IM will increase Prolixin as no clear indication EPS worse. If not improving with fluphenazine monotherapy consider as above rechallenge with quetiapine add on  4/12: The patient continues to be psychotic, intermittently compliant with medications.  Intermittent agitation, needing prn's.  Continue Prolixin and Seroquel trial  4/15: The patient continues to be psychotic, more compliant with medications overall.  Intermittent agitation continues.  Will increase Seroquel to 50mg bid.  Continue prolixin.  4/16-17: Calmer overall at times, but remains psychotic and disorganized.  Intermittent agitation continues.  Continue Seroquel and Prolixin.  4/18-9: Remains agitated at times, needing prn  Remains psychotic and disorganized.  Increased Seroquel to 75mg bid, tolerating well.  4/21 Remains psychotic agitated some reduction in prns frequency will increase Seroquel  4/23 psychotic agitated irritable, hypersexual will increase Prolixin. If not improving on current regimen consider adding lithium as patient has manic component  4/24: Still irritable, combative. Will increase Seroquel to 125mg BID.   4/25: Pt refused all meds this morning. No interval changes. Irritable, threatening.   4/26 Patient tenuous agitated refusing meds about 50%. Will use prns for severe agitation/aggression and add TOO hearing to attain more regular compliance

## 2024-04-26 NOTE — BH INPATIENT PSYCHIATRY PROGRESS NOTE - NSBHMETABOLIC_PSY_ALL_CORE_FT
BMI: BMI (kg/m2): 30.2 (01-15-24 @ 12:52)  HbA1c: A1C with Estimated Average Glucose Result: 7.6 % (01-12-24 @ 12:40)    Glucose: POCT Blood Glucose.: 137 mg/dL (04-25-24 @ 12:18)    BP: --Vital Signs Last 24 Hrs  T(C): --  T(F): --  HR: --  BP: --  BP(mean): --  RR: --  SpO2: --      Lipid Panel:

## 2024-04-26 NOTE — BH INPATIENT PSYCHIATRY PROGRESS NOTE - NSBHFUPINTERVALHXFT_PSY_A_CORE
Patient seen for follow up of psychosis with aggression, chart reviewed, and case discussed with treatment team. Pt refused vitals this morning again.   Patient continues to be intermittently agitated and combative, needing prn medications on occasion. No PRNs overnight. Pt refused all of her medications this morning. Tenuous, intrusive, loud and threatening to her peers. Eating, sleeping well.

## 2024-04-26 NOTE — BH INPATIENT PSYCHIATRY PROGRESS NOTE - MSE UNSTRUCTURED FT
Appearance: casual grooming, poor hygiene;  sig tremor. Walks with anteroflexed  though less gait though no shuffling or cogwheeling  Behavior: Intermittently agitated, makes attempts to be cooperative with interview  Speech: fluent, irritable in tone, normal rate , quite loud  Mood: "not good"  Affect: irritable, angry, labile  Thought process: disorganized, tangential, loose  Thought content: paranoid accusatory denies SI/HI. Lutheran delusions, focus on pregnancy. Sexual pre-occupation  Perceptual disturbances: no appearance of internal preoccupation  Cognition: adequately oriented  Insight: limited  Judgement: limited

## 2024-04-27 LAB
GLUCOSE BLDC GLUCOMTR-MCNC: 129 MG/DL — HIGH (ref 70–99)
GLUCOSE BLDC GLUCOMTR-MCNC: 193 MG/DL — HIGH (ref 70–99)

## 2024-04-27 PROCEDURE — 99232 SBSQ HOSP IP/OBS MODERATE 35: CPT

## 2024-04-27 RX ORDER — BENZTROPINE MESYLATE 1 MG
1 TABLET ORAL ONCE
Refills: 0 | Status: COMPLETED | OUTPATIENT
Start: 2024-04-27 | End: 2024-04-27

## 2024-04-27 RX ADMIN — Medication 400 UNIT(S): at 08:51

## 2024-04-27 RX ADMIN — QUETIAPINE FUMARATE 150 MILLIGRAM(S): 200 TABLET, FILM COATED ORAL at 08:50

## 2024-04-27 RX ADMIN — Medication 1 MILLIGRAM(S): at 08:51

## 2024-04-27 RX ADMIN — QUETIAPINE FUMARATE 150 MILLIGRAM(S): 200 TABLET, FILM COATED ORAL at 22:46

## 2024-04-27 RX ADMIN — Medication 50 MILLIGRAM(S): at 08:51

## 2024-04-27 RX ADMIN — Medication 75 MICROGRAM(S): at 05:57

## 2024-04-27 RX ADMIN — FLUPHENAZINE HYDROCHLORIDE 5 MILLIGRAM(S): 1 TABLET, FILM COATED ORAL at 22:46

## 2024-04-27 RX ADMIN — DIVALPROEX SODIUM 750 MILLIGRAM(S): 500 TABLET, DELAYED RELEASE ORAL at 22:46

## 2024-04-27 RX ADMIN — Medication 1 TABLET(S): at 22:46

## 2024-04-27 RX ADMIN — METFORMIN HYDROCHLORIDE 500 MILLIGRAM(S): 850 TABLET ORAL at 08:51

## 2024-04-27 RX ADMIN — SENNA PLUS 2 TABLET(S): 8.6 TABLET ORAL at 22:47

## 2024-04-27 RX ADMIN — LISINOPRIL 20 MILLIGRAM(S): 2.5 TABLET ORAL at 08:51

## 2024-04-27 RX ADMIN — DIVALPROEX SODIUM 750 MILLIGRAM(S): 500 TABLET, DELAYED RELEASE ORAL at 08:48

## 2024-04-27 RX ADMIN — Medication 1 MILLIGRAM(S): at 00:19

## 2024-04-27 RX ADMIN — METFORMIN HYDROCHLORIDE 500 MILLIGRAM(S): 850 TABLET ORAL at 22:46

## 2024-04-27 RX ADMIN — FLUPHENAZINE HYDROCHLORIDE 5 MILLIGRAM(S): 1 TABLET, FILM COATED ORAL at 08:50

## 2024-04-27 RX ADMIN — Medication 1 MILLIGRAM(S): at 22:46

## 2024-04-27 RX ADMIN — POLYETHYLENE GLYCOL 3350 17 GRAM(S): 17 POWDER, FOR SOLUTION ORAL at 08:52

## 2024-04-27 RX ADMIN — CARBIDOPA AND LEVODOPA 1.5 TABLET(S): 25; 100 TABLET ORAL at 08:50

## 2024-04-27 NOTE — BH INPATIENT PSYCHIATRY PROGRESS NOTE - NSBHASSESSSUMMFT_PSY_ALL_CORE
2/5 Patient showing some improving trend will give more time, if needs more medication titration due to ongoing symptoms will increase Seroquel not haldol  2/6 Improving cont meds, offered prn suppository and simethicone  2/7 Slowly improving will cont current meds, eval need for further increase Seroquel  2/8 Partial response continue meds except will increase Seroquel as was on 250mg/d PTA  2/9 Overall gains, still regresses and becomes difficult to manage will increase Seroquel to 200mg/d in divided dosing  2/12 A little calmer since started on additional mid day, cont other meds w/o change  2/13 Patient better still disorganized, hyper Restorationist but less agitated, cont current treatment  2/14 Improved globally with some symptom variability. Cont current med consider further increment Seroquel  2/15 Improved cont current regimen for now  2/16 Showing  some gains continue treatment for now at current doses   2/20 Improved, cont current rx. Scheduled urogyn for pessary replacement  2/21 Improved with variable intrusiveness. Cont meds will increase Miralax and Senna  2/22 Improving cont current rx, observe for effect of re-titration of laxatives  2/23 Lost balance mainly due to behavior however to be safe will lower haldol and Klonopin modestly and observe. Otherwise generally manageable  2/24: No PRNs needed overnight. Compliant with meds. As per staff last night pt was banging her Bible against the wall and lost her balance, staff held her. Pt remains talkative and intrusive at times but overall calmer and redirectable.   2/25: remains labile, loud, easily agitated but more redirectable.  2/26 Patient seen by medicine no evidence injury needing imaging, just Tylenol prn. Will cont current meds, if anteroflexion dosesn't improve with recent drop in haldol will consider further dose reduction  2/27 Has had slow increase in EPS despite no increase in dose and some reduction will hold haldol to reduce EPS and restart at lower dose  2/28 Patient with change in med tolerance some agitation and sedation. Will hold haldol tonight consider changing to Prolixin as may be a little better for EPS and will check labs to look for any medical issue  2/29 Labile irritable, increased tremor improved with washout of haldol. Will restart haldol but see if she can be stabilized at low dose to minimize tremor. COuld consider increasing Seroquel  3/1 Improved will see if she can stabilize on Seroquel and low dose haldol to minimize EPS will address nasal symptoms  3/2 Patient better less bent tremor less calmer. Attempt to find dose of haldol that control psychosis agitation with manageable tremor. May need to try 2mg bid as 1mg big may be subtherapeutic  3/4 Some improvement in that less agitated but EPS better will cont with low dose haldol Issues is that both side effects and decompenstion have had long lags related to dose changes so unclear if at haldol 1mg bid she is better EPS wise but will gradually decompensate. Cont to observe consider increase to 1mg tid. Patient's 2PC expiring she remains delusional disorganized not yet able to managed in SNF will apply for further retention  3/5 Showing some improvement with less agitation despite delusions but also less EPS with lowered haldol. Cont meds will reduce klonopin from 0.25mg tid to bid to reduce polypharm cont other meds. If calmer less disorganized will ask PT if she can walker trial  3/6: behavior in better control, delusional, suspected AH. taking meds.   3/7: can become irritable, redirectable. c/w current meds, ambulating hunched over.   3/8: went to interventional Urology on Fri, UA ordered, ultrasound of bladder scheduled for Monday at 10 AM,  and ob gyn for pessary ordered. If blood in urine—send back to urology office for cysto (please see the full consult in chart)  3/9: Somewhat sedated this AM, calm, not agitated, will follow and adjust meds as necessary (clonazepam)   3/10: More alert today, suspicious of examiner, UA negative  3/11: Mistrustfulness remains, leading to irritability with staff; no prns given   3/12: Labile and irritability noted, poor insight and requires continued inpatient psychiatric hospitalization for safety and stabilization.  3/13: labile, irritable on approach, redirectable. c/w current meds. Delusions noted on exam.  3/14: Labile and irritability noted, poor insight and requires continued inpatient psychiatric hospitalization for safety and stabilization.  3/15: labile, irritable on approach, redirectable. c/w current meds.  3/18 doing poor on regimen where haldol on low dose ineffective or causes sig tremor and anterflexion on higher doses. Will stop and review alternatives such as prolixin or risperidone  3/19 Patient with less sedation , still sig trremor. Delusional irritable, in altercation with roommate sees her room as belonging solely to her. Cont off haldol consider second antipsychotic has been on olanzapine, Prolixin, risperidone, she believes has not been on Abilify will consider this as trial with option for SHAFER  3/20 Psychotic irritable disorganized may go back to trial of olanzapine to allow for monotherapy and less EPS. Spoke with cally will lower Valtrex as dose is above recommended for maintenance  3/21 Louder more intrusive. Will titrate olanzapine ands use prns target 20-30mg, may need second antipsychotic given treatment resistance and clozapine not feasible  3/22 Patient sl calmer with po and prn olanzapine still lous intrusive irritable, needing prns. will increase to 5mg tid. Plan when calmer attempt to simplify regimen to bid to improve compliance.  3/23: no PRN overnight/this am. Noted walking in dayroom, calmer, no aggression now.  3/24: s/p fall, no injury, noted walking in dining area, remains hostile, easily agitated and uncooperative.   3/25 Patient loud irritable  but somewhat less disorganized. Seen by PT not assessed as sig Parkinsonized. will cont meds, will try to check VPA level in AM. Feel due to reduced disorganization patient no longer requires  will place on routine checks , will check labs including VPa level in AM  3/26 Modestly better on olanzpine. Cont trial consider increasing to 10mg bid.  Reorder labs when patient more cooperative  3/27 Modest improvement, cont olanzapine but will now move to bid dosing also Depakote and Sinemet will be made bid to decrease number of times per day she needs to be approached for medication  3/28 Modest gains from olanzapine with less EPS than when on haloperidol. COnt treatment will increase to 10mg bid try to obtain VPA level in AM  3/29 Cont olanzapine trial. Considering other options patient refuses li and likely would be hard to follow with labs. Patient feels Abilify helped in past will consider rechallenge with cross taper  3/30 Poor reponse to Zyprexa will change prn to Ativan /fluphenazine consider then standing prolicxin at low dose as only time patient stable was when she was on haldol but then developed EPS at higher dose  3/31 Remains highly agitated. Only time patient better was combo atypical and conventional will add low dose fluphenazine to olanzapine  4/1 Agitated needing prn. Will d/c olanzapine as has been completely ineffective. Will use Prolixin as she appears to respond to conventional antipsychotics but given EPS vulnerability will add Cogentin as there is no other option to manage EPS  4/2 Loud agitated psychotic no worse off olanzapine will increase fluphenazine to 2mg  bid  4/3 Loud, irritable, Restorationist delusions, very disorganized behavior such as placing self on floors, placing paper crosses on floor. Will increase Prolixin to 2.5mg bid  4/4 Psychotic , agitated disorganized. Tolerating current Prolixin, plan titrate but need to give time on each dose as she tends to have side effects lags.   4/5: irritable, religiously focused, taking medications.   4/8 Modest gains, reduce need for prns since starting prolixin, cont current dose based on level of EPS may increase to 7.5mg/d vs re add Seroquel  4/9 Somewhat better will hold off on adding another med. Will give more time on each dose based that in past she had delayed side effects  4/10 Patient worse today Needing IM will increase Prolixin as no clear indication EPS worse. If not improving with fluphenazine monotherapy consider as above rechallenge with quetiapine add on  4/12: The patient continues to be psychotic, intermittently compliant with medications.  Intermittent agitation, needing prn's.  Continue Prolixin and Seroquel trial  4/15: The patient continues to be psychotic, more compliant with medications overall.  Intermittent agitation continues.  Will increase Seroquel to 50mg bid.  Continue prolixin.  4/16-17: Calmer overall at times, but remains psychotic and disorganized.  Intermittent agitation continues.  Continue Seroquel and Prolixin.  4/18-9: Remains agitated at times, needing prn  Remains psychotic and disorganized.  Increased Seroquel to 75mg bid, tolerating well.  4/21 Remains psychotic agitated some reduction in prns frequency will increase Seroquel  4/23 psychotic agitated irritable, hypersexual will increase Prolixin. If not improving on current regimen consider adding lithium as patient has manic component  4/24: Still irritable, combative. Will increase Seroquel to 125mg BID.   4/25: Pt refused all meds this morning. No interval changes. Irritable, threatening.   4/26 Patient tenuous agitated refusing meds about 50%. Will use prns for severe agitation/aggression and add TOO hearing to attain more regular compliance  4/27 Agitated psychotic disorganzized erratic compliance. Will go for court meds over objection, consider including clozapine

## 2024-04-27 NOTE — BH INPATIENT PSYCHIATRY PROGRESS NOTE - MSE UNSTRUCTURED FT
Appearance: casual grooming, poor hygiene;  sig tremor r more than l. Walks with anteroflexed  though less gait though no shuffling or cogwheeling,   Behavior:Poorly cooperative, pleced slef on floor  Speech: sparse irritable in tone, normal rate , quite loud  Mood: "not good"  Affect: irritable, angry, labile  Thought process: disorganized, tangential, loose  Thought content: paranoid accusatory denies SI/HI. Adventism delusions, focus on pregnancy. Sexual pre-occupation, paranoid about staff killing her baby  Perceptual disturbances: no appearance of internal preoccupation  Cognition: adequately oriented  Insight: limited  Judgement: limited

## 2024-04-27 NOTE — BH INPATIENT PSYCHIATRY PROGRESS NOTE - CURRENT MEDICATION
MEDICATIONS  (STANDING):  ammonium lactate 12% Lotion 1 Application(s) Topical two times a day  benztropine 1 milliGRAM(s) Oral two times a day  carbidopa/levodopa  25/100 1.5 Tablet(s) Oral <User Schedule>  cholecalciferol 400 Unit(s) Oral daily  divalproex Sprinkle 750 milliGRAM(s) Oral two times a day  fluPHENAZine 5 milliGRAM(s) Oral two times a day  glucagon  Injectable 1 milliGRAM(s) IntraMuscular once  hemorrhoidal Ointment 1 Application(s) Rectal daily  hydrocortisone 2.5% Ointment 1 Application(s) Topical two times a day  insulin lispro (ADMELOG) corrective regimen sliding scale   SubCutaneous three times a day before meals  levothyroxine 75 MICROGram(s) Oral daily  lisinopril 20 milliGRAM(s) Oral daily  metFORMIN 500 milliGRAM(s) Oral two times a day  metoprolol succinate ER 50 milliGRAM(s) Oral daily  multivitamin 1 Tablet(s) Oral at bedtime  polyethylene glycol 3350 17 Gram(s) Oral daily  QUEtiapine 150 milliGRAM(s) Oral two times a day  senna 2 Tablet(s) Oral at bedtime    MEDICATIONS  (PRN):  acetaminophen     Tablet .. 650 milliGRAM(s) Oral every 6 hours PRN Mild Pain (1 - 3), Moderate Pain (4 - 6)  albuterol    90 MICROgram(s) HFA Inhaler 2 Puff(s) Inhalation every 6 hours PRN Shortness of Breath and/or Wheezing  aluminum hydroxide/magnesium hydroxide/simethicone Suspension 30 milliLiter(s) Oral every 6 hours PRN Dyspepsia  bisacodyl Suppository 10 milliGRAM(s) Rectal daily PRN constipation  dextrose Oral Gel 15 Gram(s) Oral once PRN Blood Glucose LESS THAN 70 milliGRAM(s)/deciliter  diphenhydrAMINE Injectable 25 milliGRAM(s) IntraMuscular once PRN agitation  fluPHENAZine 2.5 milliGRAM(s) Oral every 6 hours PRN agitation  fluPHENAZine  Injectable 2 milliGRAM(s) IntraMuscular once PRN agitation  simethicone 80 milliGRAM(s) Chew every 8 hours PRN gas  sodium chloride 0.65% Nasal 2 Spray(s) Both Nostrils every 6 hours PRN congestion

## 2024-04-27 NOTE — BH INPATIENT PSYCHIATRY PROGRESS NOTE - NSBHFUPINTERVALHXFT_PSY_A_CORE
Patient seen for follow up of psychosis with aggression, chart reviewed, and case discussed with treatment team. Pt refused vitals this morning again.   Patient continues to be intermittently agitated and combative, needing prn medications twice yesterday. Taking her standing meds about half the time. Put self on floor, saying staff killed her baby.

## 2024-04-28 LAB
ALBUMIN SERPL ELPH-MCNC: 3.9 G/DL — SIGNIFICANT CHANGE UP (ref 3.3–5)
ALP SERPL-CCNC: 89 U/L — SIGNIFICANT CHANGE UP (ref 40–120)
ALT FLD-CCNC: 5 U/L — SIGNIFICANT CHANGE UP (ref 4–33)
ANION GAP SERPL CALC-SCNC: 13 MMOL/L — SIGNIFICANT CHANGE UP (ref 7–14)
AST SERPL-CCNC: 23 U/L — SIGNIFICANT CHANGE UP (ref 4–32)
BASOPHILS # BLD AUTO: 0.04 K/UL — SIGNIFICANT CHANGE UP (ref 0–0.2)
BASOPHILS NFR BLD AUTO: 0.6 % — SIGNIFICANT CHANGE UP (ref 0–2)
BILIRUB SERPL-MCNC: <0.2 MG/DL — SIGNIFICANT CHANGE UP (ref 0.2–1.2)
BUN SERPL-MCNC: 22 MG/DL — SIGNIFICANT CHANGE UP (ref 7–23)
CALCIUM SERPL-MCNC: 9.1 MG/DL — SIGNIFICANT CHANGE UP (ref 8.4–10.5)
CHLORIDE SERPL-SCNC: 107 MMOL/L — SIGNIFICANT CHANGE UP (ref 98–107)
CO2 SERPL-SCNC: 22 MMOL/L — SIGNIFICANT CHANGE UP (ref 22–31)
CREAT SERPL-MCNC: 0.8 MG/DL — SIGNIFICANT CHANGE UP (ref 0.5–1.3)
EGFR: 79 ML/MIN/1.73M2 — SIGNIFICANT CHANGE UP
EOSINOPHIL # BLD AUTO: 0.39 K/UL — SIGNIFICANT CHANGE UP (ref 0–0.5)
EOSINOPHIL NFR BLD AUTO: 5.8 % — SIGNIFICANT CHANGE UP (ref 0–6)
GLUCOSE BLDC GLUCOMTR-MCNC: 103 MG/DL — HIGH (ref 70–99)
GLUCOSE BLDC GLUCOMTR-MCNC: 117 MG/DL — HIGH (ref 70–99)
GLUCOSE SERPL-MCNC: 139 MG/DL — HIGH (ref 70–99)
HCT VFR BLD CALC: 34.5 % — SIGNIFICANT CHANGE UP (ref 34.5–45)
HGB BLD-MCNC: 11.1 G/DL — LOW (ref 11.5–15.5)
IANC: 3.99 K/UL — SIGNIFICANT CHANGE UP (ref 1.8–7.4)
IMM GRANULOCYTES NFR BLD AUTO: 0.3 % — SIGNIFICANT CHANGE UP (ref 0–0.9)
LYMPHOCYTES # BLD AUTO: 1.45 K/UL — SIGNIFICANT CHANGE UP (ref 1–3.3)
LYMPHOCYTES # BLD AUTO: 21.6 % — SIGNIFICANT CHANGE UP (ref 13–44)
MCHC RBC-ENTMCNC: 29 PG — SIGNIFICANT CHANGE UP (ref 27–34)
MCHC RBC-ENTMCNC: 32.2 GM/DL — SIGNIFICANT CHANGE UP (ref 32–36)
MCV RBC AUTO: 90.1 FL — SIGNIFICANT CHANGE UP (ref 80–100)
MONOCYTES # BLD AUTO: 0.82 K/UL — SIGNIFICANT CHANGE UP (ref 0–0.9)
MONOCYTES NFR BLD AUTO: 12.2 % — SIGNIFICANT CHANGE UP (ref 2–14)
NEUTROPHILS # BLD AUTO: 3.99 K/UL — SIGNIFICANT CHANGE UP (ref 1.8–7.4)
NEUTROPHILS NFR BLD AUTO: 59.5 % — SIGNIFICANT CHANGE UP (ref 43–77)
NRBC # BLD: 0 /100 WBCS — SIGNIFICANT CHANGE UP (ref 0–0)
NRBC # FLD: 0 K/UL — SIGNIFICANT CHANGE UP (ref 0–0)
PLATELET # BLD AUTO: 201 K/UL — SIGNIFICANT CHANGE UP (ref 150–400)
POTASSIUM SERPL-MCNC: 4.4 MMOL/L — SIGNIFICANT CHANGE UP (ref 3.5–5.3)
POTASSIUM SERPL-SCNC: 4.4 MMOL/L — SIGNIFICANT CHANGE UP (ref 3.5–5.3)
PROT SERPL-MCNC: 7.2 G/DL — SIGNIFICANT CHANGE UP (ref 6–8.3)
RBC # BLD: 3.83 M/UL — SIGNIFICANT CHANGE UP (ref 3.8–5.2)
RBC # FLD: 15.4 % — HIGH (ref 10.3–14.5)
SODIUM SERPL-SCNC: 142 MMOL/L — SIGNIFICANT CHANGE UP (ref 135–145)
WBC # BLD: 6.71 K/UL — SIGNIFICANT CHANGE UP (ref 3.8–10.5)
WBC # FLD AUTO: 6.71 K/UL — SIGNIFICANT CHANGE UP (ref 3.8–10.5)

## 2024-04-28 PROCEDURE — 99232 SBSQ HOSP IP/OBS MODERATE 35: CPT

## 2024-04-28 RX ORDER — METOPROLOL TARTRATE 50 MG
50 TABLET ORAL DAILY
Refills: 0 | Status: DISCONTINUED | OUTPATIENT
Start: 2024-04-28 | End: 2024-05-17

## 2024-04-28 RX ORDER — FLUPHENAZINE HYDROCHLORIDE 1 MG/1
2 TABLET, FILM COATED ORAL ONCE
Refills: 0 | Status: DISCONTINUED | OUTPATIENT
Start: 2024-04-28 | End: 2024-04-28

## 2024-04-28 RX ORDER — QUETIAPINE FUMARATE 200 MG/1
150 TABLET, FILM COATED ORAL
Refills: 0 | Status: DISCONTINUED | OUTPATIENT
Start: 2024-04-28 | End: 2024-05-03

## 2024-04-28 RX ADMIN — POLYETHYLENE GLYCOL 3350 17 GRAM(S): 17 POWDER, FOR SOLUTION ORAL at 10:03

## 2024-04-28 RX ADMIN — SENNA PLUS 2 TABLET(S): 8.6 TABLET ORAL at 23:20

## 2024-04-28 RX ADMIN — FLUPHENAZINE HYDROCHLORIDE 5 MILLIGRAM(S): 1 TABLET, FILM COATED ORAL at 23:20

## 2024-04-28 RX ADMIN — CARBIDOPA AND LEVODOPA 1.5 TABLET(S): 25; 100 TABLET ORAL at 23:20

## 2024-04-28 RX ADMIN — Medication 400 UNIT(S): at 09:39

## 2024-04-28 RX ADMIN — DIVALPROEX SODIUM 750 MILLIGRAM(S): 500 TABLET, DELAYED RELEASE ORAL at 09:37

## 2024-04-28 RX ADMIN — CARBIDOPA AND LEVODOPA 1.5 TABLET(S): 25; 100 TABLET ORAL at 09:38

## 2024-04-28 RX ADMIN — Medication 75 MICROGRAM(S): at 07:02

## 2024-04-28 RX ADMIN — METFORMIN HYDROCHLORIDE 500 MILLIGRAM(S): 850 TABLET ORAL at 09:39

## 2024-04-28 RX ADMIN — QUETIAPINE FUMARATE 150 MILLIGRAM(S): 200 TABLET, FILM COATED ORAL at 23:21

## 2024-04-28 RX ADMIN — Medication 1 MILLIGRAM(S): at 09:39

## 2024-04-28 RX ADMIN — DIVALPROEX SODIUM 750 MILLIGRAM(S): 500 TABLET, DELAYED RELEASE ORAL at 23:19

## 2024-04-28 RX ADMIN — Medication 1 MILLIGRAM(S): at 23:21

## 2024-04-28 RX ADMIN — Medication 1 TABLET(S): at 23:22

## 2024-04-28 RX ADMIN — METFORMIN HYDROCHLORIDE 500 MILLIGRAM(S): 850 TABLET ORAL at 23:20

## 2024-04-28 RX ADMIN — FLUPHENAZINE HYDROCHLORIDE 5 MILLIGRAM(S): 1 TABLET, FILM COATED ORAL at 09:39
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
## 2024-04-28 NOTE — BH INPATIENT PSYCHIATRY PROGRESS NOTE - MSE UNSTRUCTURED FT
Appearance: casual grooming, poor hygiene;  sig tremor r more than l. Walks with anteroflexed  though less gait though no shuffling or cogwheeling,   Behavior:Poorly cooperative, pleced slef on floor  Speech: sparse irritable in tone, normal rate , quite loud  Mood: "not good"  Affect: irritable, angry, labile  Thought process: disorganized, tangential, loose  Thought content: paranoid accusatory denies SI/HI. Jain delusions discussing Hasidic Jews, focus on pregnancy. Sexual pre-occupation, paranoid about staff killing her baby, feels water supply is poisoned   Perceptual disturbances: no appearance of internal preoccupation  Cognition: adequately oriented  Insight: limited  Judgement: limited

## 2024-04-28 NOTE — BH INPATIENT PSYCHIATRY PROGRESS NOTE - NSBHMETABOLIC_PSY_ALL_CORE_FT
BMI: BMI (kg/m2): 30.2 (01-15-24 @ 12:52)  HbA1c: A1C with Estimated Average Glucose Result: 7.6 % (01-12-24 @ 12:40)    Glucose: POCT Blood Glucose.: 193 mg/dL (04-27-24 @ 20:25)    BP: 162/90 (04-27-24 @ 08:35) (162/90 - 162/90)Vital Signs Last 24 Hrs  T(C): --  T(F): --  HR: --  BP: --  BP(mean): --  RR: --  SpO2: --      Lipid Panel:

## 2024-04-28 NOTE — BH INPATIENT PSYCHIATRY PROGRESS NOTE - NSBHFUPINTERVALHXFT_PSY_A_CORE
Room 23     Identified pt with two pt identifiers(name and ). Reviewed record in preparation for visit and have obtained necessary documentation. All patient medications has been reviewed. Chief Complaint   Patient presents with    Establish Care     Patient has had high BP readings May/July. Would like weight loss medication. Pt also reports \"I get winded with exertion x 5 months\". 3 most recent PHQ Screens 2019   PHQ Not Done -   Little interest or pleasure in doing things Not at all   Feeling down, depressed, irritable, or hopeless Not at all   Total Score PHQ 2 0     No flowsheet data found. Health Maintenance Due   Topic    Hepatitis C Screening     Depression Screen     COVID-19 Vaccine (1)    DTaP/Tdap/Td series (1 - Tdap)    Breast Cancer Screen Mammogram     Colorectal Cancer Screening Combo     Lipid Screen     Shingrix Vaccine Age 50> (1 of 2)     Health Maintenance Review: Patient reminded of \"due or due soon\" health maintenance. I have asked the patient to contact his/her primary care provider (PCP) for follow-up on his/her health maintenance. Vitals:    22 0947   BP: 120/85   Pulse: 87   Resp: 16   Temp: 99.1 °F (37.3 °C)   TempSrc: Oral   SpO2: 97%   Weight: 168 lb 3.2 oz (76.3 kg)   Height: 5' 6\" (1.676 m)   PainSc:   0 - No pain       Wt Readings from Last 3 Encounters:   22 168 lb 3.2 oz (76.3 kg)   19 155 lb (70.3 kg)   12/10/19 155 lb (70.3 kg)     Temp Readings from Last 3 Encounters:   22 99.1 °F (37.3 °C) (Oral)   19 98.3 °F (36.8 °C) (Oral)   19 98.4 °F (36.9 °C) (Oral)     BP Readings from Last 3 Encounters:   22 120/85   19 118/80   19 106/66     Pulse Readings from Last 3 Encounters:   22 87   19 85   19 76       1. \"Have you been to the ER, urgent care clinic since your last visit? Hospitalized since your last visit? \" No    2.  \"Have you seen or consulted any other health care providers outside of the [address] [place] [address] since your last visit? \" No     3. For patients aged 39-70: Has the patient had a colonoscopy / FIT/ Cologuard? No      If the patient is female:    4. For patients aged 41-77: Has the patient had a mammogram within the past 2 years? Yes - no Care Gap present      5. For patients aged 21-65: Has the patient had a pap smear?  Yes - no Care Gap present Patient seen for follow up of psychosis with aggression, chart reviewed, and case discussed with treatment team.Vitals WNL but soft  Patient continues to be intermittently agitated and combative, needing prn medications Taking her standing meds about half the time. Sl less disorganized today

## 2024-04-29 PROCEDURE — 99232 SBSQ HOSP IP/OBS MODERATE 35: CPT

## 2024-04-29 RX ADMIN — Medication 400 UNIT(S): at 10:51

## 2024-04-29 RX ADMIN — FLUPHENAZINE HYDROCHLORIDE 5 MILLIGRAM(S): 1 TABLET, FILM COATED ORAL at 10:52

## 2024-04-29 RX ADMIN — Medication 1 MILLIGRAM(S): at 10:54

## 2024-04-29 RX ADMIN — METFORMIN HYDROCHLORIDE 500 MILLIGRAM(S): 850 TABLET ORAL at 10:52

## 2024-04-29 RX ADMIN — QUETIAPINE FUMARATE 150 MILLIGRAM(S): 200 TABLET, FILM COATED ORAL at 10:53

## 2024-04-29 RX ADMIN — DIVALPROEX SODIUM 750 MILLIGRAM(S): 500 TABLET, DELAYED RELEASE ORAL at 10:55

## 2024-04-29 RX ADMIN — Medication 75 MICROGRAM(S): at 05:22

## 2024-04-29 RX ADMIN — Medication 50 MILLIGRAM(S): at 10:52

## 2024-04-29 RX ADMIN — CARBIDOPA AND LEVODOPA 1.5 TABLET(S): 25; 100 TABLET ORAL at 10:50

## 2024-04-29 NOTE — BH INPATIENT PSYCHIATRY PROGRESS NOTE - NSBHMETABOLIC_PSY_ALL_CORE_FT
BMI: BMI (kg/m2): 30.2 (01-15-24 @ 12:52)  HbA1c: A1C with Estimated Average Glucose Result: 7.6 % (01-12-24 @ 12:40)    Glucose: POCT Blood Glucose.: 103 mg/dL (04-28-24 @ 16:40)    BP: 162/90 (04-27-24 @ 08:35) (162/90 - 162/90)Vital Signs Last 24 Hrs  T(C): --  T(F): --  HR: --  BP: --  BP(mean): --  RR: --  SpO2: --      Lipid Panel:

## 2024-04-29 NOTE — BH INPATIENT PSYCHIATRY PROGRESS NOTE - MSE UNSTRUCTURED FT
Appearance: casual grooming, poor hygiene;  sig tremor r more than l. Walks with anteroflexed  though less gait though no shuffling or cogwheeling,   Behavior:Poorly cooperative  Speech: sparse irritable in tone, normal rate , quite loud  Mood: "not good"  Affect: irritable, angry, labile  Thought process: disorganized, tangential, loose  Thought content: paranoid accusatory denies SI/HI. Zoroastrian delusions discussing Hasidic Jews, focus on pregnancy. Sexual pre-occupation, paranoid about staff killing her baby, feels water supply is poisoned , thinks she has a brain tumor requiring medical admission  Perceptual disturbances: no appearance of internal preoccupation  Cognition: adequately oriented  Insight: limited  Judgement: limited

## 2024-04-29 NOTE — BH INPATIENT PSYCHIATRY PROGRESS NOTE - NSBHFUPINTERVALHXFT_PSY_A_CORE
Patient seen for follow up of psychosis with aggression, chart reviewed, and case discussed with treatment team.Vitals WNL but soft  Patient continues to be intermittently agitated and combative, needing prn medications Taking her standing meds very variably as well as variable cooperation with vitals. Labs unremarkable

## 2024-04-29 NOTE — BH INPATIENT PSYCHIATRY PROGRESS NOTE - NSBHASSESSSUMMFT_PSY_ALL_CORE
2/5 Patient showing some improving trend will give more time, if needs more medication titration due to ongoing symptoms will increase Seroquel not haldol  2/6 Improving cont meds, offered prn suppository and simethicone  2/7 Slowly improving will cont current meds, eval need for further increase Seroquel  2/8 Partial response continue meds except will increase Seroquel as was on 250mg/d PTA  2/9 Overall gains, still regresses and becomes difficult to manage will increase Seroquel to 200mg/d in divided dosing  2/12 A little calmer since started on additional mid day, cont other meds w/o change  2/13 Patient better still disorganized, hyper Quaker but less agitated, cont current treatment  2/14 Improved globally with some symptom variability. Cont current med consider further increment Seroquel  2/15 Improved cont current regimen for now  2/16 Showing  some gains continue treatment for now at current doses   2/20 Improved, cont current rx. Scheduled urogyn for pessary replacement  2/21 Improved with variable intrusiveness. Cont meds will increase Miralax and Senna  2/22 Improving cont current rx, observe for effect of re-titration of laxatives  2/23 Lost balance mainly due to behavior however to be safe will lower haldol and Klonopin modestly and observe. Otherwise generally manageable  2/24: No PRNs needed overnight. Compliant with meds. As per staff last night pt was banging her Bible against the wall and lost her balance, staff held her. Pt remains talkative and intrusive at times but overall calmer and redirectable.   2/25: remains labile, loud, easily agitated but more redirectable.  2/26 Patient seen by medicine no evidence injury needing imaging, just Tylenol prn. Will cont current meds, if anteroflexion dosesn't improve with recent drop in haldol will consider further dose reduction  2/27 Has had slow increase in EPS despite no increase in dose and some reduction will hold haldol to reduce EPS and restart at lower dose  2/28 Patient with change in med tolerance some agitation and sedation. Will hold haldol tonight consider changing to Prolixin as may be a little better for EPS and will check labs to look for any medical issue  2/29 Labile irritable, increased tremor improved with washout of haldol. Will restart haldol but see if she can be stabilized at low dose to minimize tremor. COuld consider increasing Seroquel  3/1 Improved will see if she can stabilize on Seroquel and low dose haldol to minimize EPS will address nasal symptoms  3/2 Patient better less bent tremor less calmer. Attempt to find dose of haldol that control psychosis agitation with manageable tremor. May need to try 2mg bid as 1mg big may be subtherapeutic  3/4 Some improvement in that less agitated but EPS better will cont with low dose haldol Issues is that both side effects and decompenstion have had long lags related to dose changes so unclear if at haldol 1mg bid she is better EPS wise but will gradually decompensate. Cont to observe consider increase to 1mg tid. Patient's 2PC expiring she remains delusional disorganized not yet able to managed in SNF will apply for further retention  3/5 Showing some improvement with less agitation despite delusions but also less EPS with lowered haldol. Cont meds will reduce klonopin from 0.25mg tid to bid to reduce polypharm cont other meds. If calmer less disorganized will ask PT if she can walker trial  3/6: behavior in better control, delusional, suspected AH. taking meds.   3/7: can become irritable, redirectable. c/w current meds, ambulating hunched over.   3/8: went to interventional Urology on Fri, UA ordered, ultrasound of bladder scheduled for Monday at 10 AM,  and ob gyn for pessary ordered. If blood in urine—send back to urology office for cysto (please see the full consult in chart)  3/9: Somewhat sedated this AM, calm, not agitated, will follow and adjust meds as necessary (clonazepam)   3/10: More alert today, suspicious of examiner, UA negative  3/11: Mistrustfulness remains, leading to irritability with staff; no prns given   3/12: Labile and irritability noted, poor insight and requires continued inpatient psychiatric hospitalization for safety and stabilization.  3/13: labile, irritable on approach, redirectable. c/w current meds. Delusions noted on exam.  3/14: Labile and irritability noted, poor insight and requires continued inpatient psychiatric hospitalization for safety and stabilization.  3/15: labile, irritable on approach, redirectable. c/w current meds.  3/18 doing poor on regimen where haldol on low dose ineffective or causes sig tremor and anterflexion on higher doses. Will stop and review alternatives such as prolixin or risperidone  3/19 Patient with less sedation , still sig trremor. Delusional irritable, in altercation with roommate sees her room as belonging solely to her. Cont off haldol consider second antipsychotic has been on olanzapine, Prolixin, risperidone, she believes has not been on Abilify will consider this as trial with option for SHAFER  3/20 Psychotic irritable disorganized may go back to trial of olanzapine to allow for monotherapy and less EPS. Spoke with cally will lower Valtrex as dose is above recommended for maintenance  3/21 Louder more intrusive. Will titrate olanzapine ands use prns target 20-30mg, may need second antipsychotic given treatment resistance and clozapine not feasible  3/22 Patient sl calmer with po and prn olanzapine still lous intrusive irritable, needing prns. will increase to 5mg tid. Plan when calmer attempt to simplify regimen to bid to improve compliance.  3/23: no PRN overnight/this am. Noted walking in dayroom, calmer, no aggression now.  3/24: s/p fall, no injury, noted walking in dining area, remains hostile, easily agitated and uncooperative.   3/25 Patient loud irritable  but somewhat less disorganized. Seen by PT not assessed as sig Parkinsonized. will cont meds, will try to check VPA level in AM. Feel due to reduced disorganization patient no longer requires  will place on routine checks , will check labs including VPa level in AM  3/26 Modestly better on olanzpine. Cont trial consider increasing to 10mg bid.  Reorder labs when patient more cooperative  3/27 Modest improvement, cont olanzapine but will now move to bid dosing also Depakote and Sinemet will be made bid to decrease number of times per day she needs to be approached for medication  3/28 Modest gains from olanzapine with less EPS than when on haloperidol. COnt treatment will increase to 10mg bid try to obtain VPA level in AM  3/29 Cont olanzapine trial. Considering other options patient refuses li and likely would be hard to follow with labs. Patient feels Abilify helped in past will consider rechallenge with cross taper  3/30 Poor reponse to Zyprexa will change prn to Ativan /fluphenazine consider then standing prolicxin at low dose as only time patient stable was when she was on haldol but then developed EPS at higher dose  3/31 Remains highly agitated. Only time patient better was combo atypical and conventional will add low dose fluphenazine to olanzapine  4/1 Agitated needing prn. Will d/c olanzapine as has been completely ineffective. Will use Prolixin as she appears to respond to conventional antipsychotics but given EPS vulnerability will add Cogentin as there is no other option to manage EPS  4/2 Loud agitated psychotic no worse off olanzapine will increase fluphenazine to 2mg  bid  4/3 Loud, irritable, Quaker delusions, very disorganized behavior such as placing self on floors, placing paper crosses on floor. Will increase Prolixin to 2.5mg bid  4/4 Psychotic , agitated disorganized. Tolerating current Prolixin, plan titrate but need to give time on each dose as she tends to have side effects lags.   4/5: irritable, religiously focused, taking medications.   4/8 Modest gains, reduce need for prns since starting prolixin, cont current dose based on level of EPS may increase to 7.5mg/d vs re add Seroquel  4/9 Somewhat better will hold off on adding another med. Will give more time on each dose based that in past she had delayed side effects  4/10 Patient worse today Needing IM will increase Prolixin as no clear indication EPS worse. If not improving with fluphenazine monotherapy consider as above rechallenge with quetiapine add on  4/12: The patient continues to be psychotic, intermittently compliant with medications.  Intermittent agitation, needing prn's.  Continue Prolixin and Seroquel trial  4/15: The patient continues to be psychotic, more compliant with medications overall.  Intermittent agitation continues.  Will increase Seroquel to 50mg bid.  Continue prolixin.  4/16-17: Calmer overall at times, but remains psychotic and disorganized.  Intermittent agitation continues.  Continue Seroquel and Prolixin.  4/18-9: Remains agitated at times, needing prn  Remains psychotic and disorganized.  Increased Seroquel to 75mg bid, tolerating well.  4/21 Remains psychotic agitated some reduction in prns frequency will increase Seroquel  4/23 psychotic agitated irritable, hypersexual will increase Prolixin. If not improving on current regimen consider adding lithium as patient has manic component  4/24: Still irritable, combative. Will increase Seroquel to 125mg BID.   4/25: Pt refused all meds this morning. No interval changes. Irritable, threatening.   4/26 Patient tenuous agitated refusing meds about 50%. Will use prns for severe agitation/aggression and add TOO hearing to attain more regular compliance  4/27 Agitated psychotic disorganzized erratic compliance. Will go for court meds over objection, consider including clozapine  4/28 Patient a little calmer soft BP adjusted hold parameters on meds affecting BP. Patient willing to consider clozapine will get baseline labs and do REMS if this case  4/29 Very psychotic labile will cont meds and apply for TOO to get better consistency

## 2024-04-30 LAB — GLUCOSE BLDC GLUCOMTR-MCNC: 158 MG/DL — HIGH (ref 70–99)

## 2024-04-30 PROCEDURE — 99232 SBSQ HOSP IP/OBS MODERATE 35: CPT

## 2024-04-30 RX ADMIN — SENNA PLUS 2 TABLET(S): 8.6 TABLET ORAL at 20:11

## 2024-04-30 RX ADMIN — Medication 1 MILLIGRAM(S): at 20:11

## 2024-04-30 RX ADMIN — FLUPHENAZINE HYDROCHLORIDE 5 MILLIGRAM(S): 1 TABLET, FILM COATED ORAL at 20:12

## 2024-04-30 RX ADMIN — QUETIAPINE FUMARATE 150 MILLIGRAM(S): 200 TABLET, FILM COATED ORAL at 20:12

## 2024-04-30 RX ADMIN — CARBIDOPA AND LEVODOPA 1.5 TABLET(S): 25; 100 TABLET ORAL at 20:11

## 2024-04-30 RX ADMIN — DIVALPROEX SODIUM 750 MILLIGRAM(S): 500 TABLET, DELAYED RELEASE ORAL at 20:12

## 2024-04-30 RX ADMIN — Medication 75 MICROGRAM(S): at 05:11

## 2024-04-30 RX ADMIN — METFORMIN HYDROCHLORIDE 500 MILLIGRAM(S): 850 TABLET ORAL at 20:11

## 2024-04-30 RX ADMIN — Medication 1 TABLET(S): at 20:12

## 2024-04-30 RX ADMIN — Medication 1: at 08:55

## 2024-04-30 RX ADMIN — POLYETHYLENE GLYCOL 3350 17 GRAM(S): 17 POWDER, FOR SOLUTION ORAL at 10:20

## 2024-04-30 NOTE — BH INPATIENT PSYCHIATRY PROGRESS NOTE - NSBHMETABOLIC_PSY_ALL_CORE_FT
BMI: BMI (kg/m2): 30.2 (01-15-24 @ 12:52)  HbA1c: A1C with Estimated Average Glucose Result: 7.6 % (01-12-24 @ 12:40)    Glucose: POCT Blood Glucose.: 158 mg/dL (04-30-24 @ 08:24)    BP: 133/82 (04-30-24 @ 08:26) (133/82 - 133/82)Vital Signs Last 24 Hrs  T(C): --  T(F): --  HR: 76 (04-30-24 @ 08:26) (76 - 76)  BP: 133/82 (04-30-24 @ 08:26) (133/82 - 133/82)  BP(mean): --  RR: 16 (04-30-24 @ 08:26) (16 - 16)  SpO2: --      Lipid Panel:

## 2024-04-30 NOTE — BH INPATIENT PSYCHIATRY PROGRESS NOTE - MSE UNSTRUCTURED FT
Appearance: casual grooming, poor hygiene;  sig tremor r more than l. Walks with anteroflexed  though less gait though no shuffling or cogwheeling,   Behavior:Poorly cooperative  Speech: sparse irritable in tone, normal rate , quite loud  Mood: "not good"  Affect: irritable, angry, labile  Thought process: disorganized, tangential, loose  Thought content: paranoid accusatory denies SI/HI. Mandaeism delusions discussing Hasidic Jews, focus on pregnancy. Sexual pre-occupation, paranoid about staff killing her baby, feels water supply is poisoned , thinks she has a brain tumor requiring medical admission  Perceptual disturbances: no appearance of internal preoccupation  Cognition: adequately oriented  Insight: limited  Judgement: limited

## 2024-04-30 NOTE — BH INPATIENT PSYCHIATRY PROGRESS NOTE - NSBHCHARTREVIEWVS_PSY_A_CORE FT
Vital Signs Last 24 Hrs  T(C): --  T(F): --  HR: 76 (04-30-24 @ 08:26) (76 - 76)  BP: 133/82 (04-30-24 @ 08:26) (133/82 - 133/82)  BP(mean): --  RR: 16 (04-30-24 @ 08:26) (16 - 16)  SpO2: --

## 2024-04-30 NOTE — BH INPATIENT PSYCHIATRY PROGRESS NOTE - NSBHASSESSSUMMFT_PSY_ALL_CORE
2/5 Patient showing some improving trend will give more time, if needs more medication titration due to ongoing symptoms will increase Seroquel not haldol  2/6 Improving cont meds, offered prn suppository and simethicone  2/7 Slowly improving will cont current meds, eval need for further increase Seroquel  2/8 Partial response continue meds except will increase Seroquel as was on 250mg/d PTA  2/9 Overall gains, still regresses and becomes difficult to manage will increase Seroquel to 200mg/d in divided dosing  2/12 A little calmer since started on additional mid day, cont other meds w/o change  2/13 Patient better still disorganized, hyper Latter-day but less agitated, cont current treatment  2/14 Improved globally with some symptom variability. Cont current med consider further increment Seroquel  2/15 Improved cont current regimen for now  2/16 Showing  some gains continue treatment for now at current doses   2/20 Improved, cont current rx. Scheduled urogyn for pessary replacement  2/21 Improved with variable intrusiveness. Cont meds will increase Miralax and Senna  2/22 Improving cont current rx, observe for effect of re-titration of laxatives  2/23 Lost balance mainly due to behavior however to be safe will lower haldol and Klonopin modestly and observe. Otherwise generally manageable  2/24: No PRNs needed overnight. Compliant with meds. As per staff last night pt was banging her Bible against the wall and lost her balance, staff held her. Pt remains talkative and intrusive at times but overall calmer and redirectable.   2/25: remains labile, loud, easily agitated but more redirectable.  2/26 Patient seen by medicine no evidence injury needing imaging, just Tylenol prn. Will cont current meds, if anteroflexion dosesn't improve with recent drop in haldol will consider further dose reduction  2/27 Has had slow increase in EPS despite no increase in dose and some reduction will hold haldol to reduce EPS and restart at lower dose  2/28 Patient with change in med tolerance some agitation and sedation. Will hold haldol tonight consider changing to Prolixin as may be a little better for EPS and will check labs to look for any medical issue  2/29 Labile irritable, increased tremor improved with washout of haldol. Will restart haldol but see if she can be stabilized at low dose to minimize tremor. COuld consider increasing Seroquel  3/1 Improved will see if she can stabilize on Seroquel and low dose haldol to minimize EPS will address nasal symptoms  3/2 Patient better less bent tremor less calmer. Attempt to find dose of haldol that control psychosis agitation with manageable tremor. May need to try 2mg bid as 1mg big may be subtherapeutic  3/4 Some improvement in that less agitated but EPS better will cont with low dose haldol Issues is that both side effects and decompenstion have had long lags related to dose changes so unclear if at haldol 1mg bid she is better EPS wise but will gradually decompensate. Cont to observe consider increase to 1mg tid. Patient's 2PC expiring she remains delusional disorganized not yet able to managed in SNF will apply for further retention  3/5 Showing some improvement with less agitation despite delusions but also less EPS with lowered haldol. Cont meds will reduce klonopin from 0.25mg tid to bid to reduce polypharm cont other meds. If calmer less disorganized will ask PT if she can walker trial  3/6: behavior in better control, delusional, suspected AH. taking meds.   3/7: can become irritable, redirectable. c/w current meds, ambulating hunched over.   3/8: went to interventional Urology on Fri, UA ordered, ultrasound of bladder scheduled for Monday at 10 AM,  and ob gyn for pessary ordered. If blood in urine—send back to urology office for cysto (please see the full consult in chart)  3/9: Somewhat sedated this AM, calm, not agitated, will follow and adjust meds as necessary (clonazepam)   3/10: More alert today, suspicious of examiner, UA negative  3/11: Mistrustfulness remains, leading to irritability with staff; no prns given   3/12: Labile and irritability noted, poor insight and requires continued inpatient psychiatric hospitalization for safety and stabilization.  3/13: labile, irritable on approach, redirectable. c/w current meds. Delusions noted on exam.  3/14: Labile and irritability noted, poor insight and requires continued inpatient psychiatric hospitalization for safety and stabilization.  3/15: labile, irritable on approach, redirectable. c/w current meds.  3/18 doing poor on regimen where haldol on low dose ineffective or causes sig tremor and anterflexion on higher doses. Will stop and review alternatives such as prolixin or risperidone  3/19 Patient with less sedation , still sig trremor. Delusional irritable, in altercation with roommate sees her room as belonging solely to her. Cont off haldol consider second antipsychotic has been on olanzapine, Prolixin, risperidone, she believes has not been on Abilify will consider this as trial with option for SHAFER  3/20 Psychotic irritable disorganized may go back to trial of olanzapine to allow for monotherapy and less EPS. Spoke with cally will lower Valtrex as dose is above recommended for maintenance  3/21 Louder more intrusive. Will titrate olanzapine ands use prns target 20-30mg, may need second antipsychotic given treatment resistance and clozapine not feasible  3/22 Patient sl calmer with po and prn olanzapine still lous intrusive irritable, needing prns. will increase to 5mg tid. Plan when calmer attempt to simplify regimen to bid to improve compliance.  3/23: no PRN overnight/this am. Noted walking in dayroom, calmer, no aggression now.  3/24: s/p fall, no injury, noted walking in dining area, remains hostile, easily agitated and uncooperative.   3/25 Patient loud irritable  but somewhat less disorganized. Seen by PT not assessed as sig Parkinsonized. will cont meds, will try to check VPA level in AM. Feel due to reduced disorganization patient no longer requires  will place on routine checks , will check labs including VPa level in AM  3/26 Modestly better on olanzpine. Cont trial consider increasing to 10mg bid.  Reorder labs when patient more cooperative  3/27 Modest improvement, cont olanzapine but will now move to bid dosing also Depakote and Sinemet will be made bid to decrease number of times per day she needs to be approached for medication  3/28 Modest gains from olanzapine with less EPS than when on haloperidol. COnt treatment will increase to 10mg bid try to obtain VPA level in AM  3/29 Cont olanzapine trial. Considering other options patient refuses li and likely would be hard to follow with labs. Patient feels Abilify helped in past will consider rechallenge with cross taper  3/30 Poor reponse to Zyprexa will change prn to Ativan /fluphenazine consider then standing prolicxin at low dose as only time patient stable was when she was on haldol but then developed EPS at higher dose  3/31 Remains highly agitated. Only time patient better was combo atypical and conventional will add low dose fluphenazine to olanzapine  4/1 Agitated needing prn. Will d/c olanzapine as has been completely ineffective. Will use Prolixin as she appears to respond to conventional antipsychotics but given EPS vulnerability will add Cogentin as there is no other option to manage EPS  4/2 Loud agitated psychotic no worse off olanzapine will increase fluphenazine to 2mg  bid  4/3 Loud, irritable, Latter-day delusions, very disorganized behavior such as placing self on floors, placing paper crosses on floor. Will increase Prolixin to 2.5mg bid  4/4 Psychotic , agitated disorganized. Tolerating current Prolixin, plan titrate but need to give time on each dose as she tends to have side effects lags.   4/5: irritable, religiously focused, taking medications.   4/8 Modest gains, reduce need for prns since starting prolixin, cont current dose based on level of EPS may increase to 7.5mg/d vs re add Seroquel  4/9 Somewhat better will hold off on adding another med. Will give more time on each dose based that in past she had delayed side effects  4/10 Patient worse today Needing IM will increase Prolixin as no clear indication EPS worse. If not improving with fluphenazine monotherapy consider as above rechallenge with quetiapine add on  4/12: The patient continues to be psychotic, intermittently compliant with medications.  Intermittent agitation, needing prn's.  Continue Prolixin and Seroquel trial  4/15: The patient continues to be psychotic, more compliant with medications overall.  Intermittent agitation continues.  Will increase Seroquel to 50mg bid.  Continue prolixin.  4/16-17: Calmer overall at times, but remains psychotic and disorganized.  Intermittent agitation continues.  Continue Seroquel and Prolixin.  4/18-9: Remains agitated at times, needing prn  Remains psychotic and disorganized.  Increased Seroquel to 75mg bid, tolerating well.  4/21 Remains psychotic agitated some reduction in prns frequency will increase Seroquel  4/23 psychotic agitated irritable, hypersexual will increase Prolixin. If not improving on current regimen consider adding lithium as patient has manic component  4/24: Still irritable, combative. Will increase Seroquel to 125mg BID.   4/25: Pt refused all meds this morning. No interval changes. Irritable, threatening.   4/26 Patient tenuous agitated refusing meds about 50%. Will use prns for severe agitation/aggression and add TOO hearing to attain more regular compliance  4/27 Agitated psychotic disorganzized erratic compliance. Will go for court meds over objection, consider including clozapine  4/28 Patient a little calmer soft BP adjusted hold parameters on meds affecting BP. Patient willing to consider clozapine will get baseline labs and do REMS if this case  4/29 Very psychotic labile will cont meds and apply for TOO to get better consistency  4/30 Patient psychotic disorganized agitated some EPS as well as some anteroflexion that may have behavioral component cont meds applied for too to provide more consistent treatment

## 2024-04-30 NOTE — BH INPATIENT PSYCHIATRY PROGRESS NOTE - NSBHFUPINTERVALHXFT_PSY_A_CORE
Patient seen for follow up of psychosis with aggression, chart reviewed, and case discussed with treatment team.Vitals WNL but soft  Patient continues to be intermittently agitated and combative, needing prn medications Taking her standing meds very variably as well as variable cooperation with vitals. Labs unremarkable, putting self on floor, degree of being hunched over highly variable

## 2024-05-01 LAB — GLUCOSE BLDC GLUCOMTR-MCNC: 141 MG/DL — HIGH (ref 70–99)

## 2024-05-01 PROCEDURE — 99232 SBSQ HOSP IP/OBS MODERATE 35: CPT

## 2024-05-01 RX ADMIN — Medication 75 MICROGRAM(S): at 06:07

## 2024-05-01 NOTE — BH INPATIENT PSYCHIATRY PROGRESS NOTE - MSE UNSTRUCTURED FT
Appearance: casual grooming, poor hygiene;  sig tremor r more than l. Walks with anteroflexed  though less gait though no shuffling or cogwheeling,   Behavior:Poorly cooperative  Speech: sparse irritable in tone, normal rate , quite loud  Mood: "not good"  Affect: irritable, angry, labile  Thought process: disorganized, tangential, loose  Thought content: paranoid accusatory denies SI/HI. Pentecostal delusions discussing Hasidic Jews, focus on pregnancy. Sexual pre-occupation, paranoid about staff killing her baby, feels water supply is poisoned , thinks she needs brain surgery urgently  Perceptual disturbances: no appearance of internal preoccupation  Cognition: adequately oriented  Insight: limited  Judgement: limited

## 2024-05-01 NOTE — BH INPATIENT PSYCHIATRY PROGRESS NOTE - NSBHMETABOLIC_PSY_ALL_CORE_FT
BMI: BMI (kg/m2): 30.2 (01-15-24 @ 12:52)  HbA1c: A1C with Estimated Average Glucose Result: 7.6 % (01-12-24 @ 12:40)    Glucose: POCT Blood Glucose.: 141 mg/dL (05-01-24 @ 08:04)    BP: 133/82 (04-30-24 @ 08:26) (133/82 - 133/82)Vital Signs Last 24 Hrs  T(C): 36.4 (05-01-24 @ 07:55), Max: 36.4 (05-01-24 @ 07:55)  T(F): 97.6 (05-01-24 @ 07:55), Max: 97.6 (05-01-24 @ 07:55)  HR: --  BP: --  BP(mean): --  RR: --  SpO2: --    Orthostatic VS  05-01-24 @ 07:55  Lying BP: --/-- HR: --  Sitting BP: 133/90 HR: 89  Standing BP: 129/77 HR: 92  Site: --  Mode: --    Lipid Panel:
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
## 2024-05-01 NOTE — BH INPATIENT PSYCHIATRY PROGRESS NOTE - NSBHFUPINTERVALHXFT_PSY_A_CORE
Patient seen for follow up of psychosis with aggression, chart reviewed, and case discussed with treatment team.Vitals WNL but soft  Patient continues to be intermittently agitated and combative, needing prn medications Taking her standing meds very variably resultinbg in about half of doses missedLabs unremarkable, sl more subdued last day

## 2024-05-01 NOTE — BH INPATIENT PSYCHIATRY PROGRESS NOTE - NSBHCHARTREVIEWVS_PSY_A_CORE FT
Vital Signs Last 24 Hrs  T(C): 36.4 (05-01-24 @ 07:55), Max: 36.4 (05-01-24 @ 07:55)  T(F): 97.6 (05-01-24 @ 07:55), Max: 97.6 (05-01-24 @ 07:55)  HR: --  BP: --  BP(mean): --  RR: --  SpO2: --    Orthostatic VS  05-01-24 @ 07:55  Lying BP: --/-- HR: --  Sitting BP: 133/90 HR: 89  Standing BP: 129/77 HR: 92  Site: --  Mode: --

## 2024-05-02 PROCEDURE — 99232 SBSQ HOSP IP/OBS MODERATE 35: CPT

## 2024-05-02 RX ADMIN — Medication 75 MICROGRAM(S): at 05:59

## 2024-05-02 NOTE — BH INPATIENT PSYCHIATRY PROGRESS NOTE - NSBHFUPINTERVALHXFT_PSY_A_CORE
Patient seen for follow up of psychosis with aggression, chart reviewed, and case discussed with treatment team.Vitals WNL but soft  Patient continues to be intermittently agitated and combative, needing prn medications Taking her standing meds very variably resulting in about half of doses missedLabs unremarkable, sl more subdued last day

## 2024-05-02 NOTE — BH INPATIENT PSYCHIATRY PROGRESS NOTE - NSBHASSESSSUMMFT_PSY_ALL_CORE
2/5 Patient showing some improving trend will give more time, if needs more medication titration due to ongoing symptoms will increase Seroquel not haldol  2/6 Improving cont meds, offered prn suppository and simethicone  2/7 Slowly improving will cont current meds, eval need for further increase Seroquel  2/8 Partial response continue meds except will increase Seroquel as was on 250mg/d PTA  2/9 Overall gains, still regresses and becomes difficult to manage will increase Seroquel to 200mg/d in divided dosing  2/12 A little calmer since started on additional mid day, cont other meds w/o change  2/13 Patient better still disorganized, hyper Pentecostal but less agitated, cont current treatment  2/14 Improved globally with some symptom variability. Cont current med consider further increment Seroquel  2/15 Improved cont current regimen for now  2/16 Showing  some gains continue treatment for now at current doses   2/20 Improved, cont current rx. Scheduled urogyn for pessary replacement  2/21 Improved with variable intrusiveness. Cont meds will increase Miralax and Senna  2/22 Improving cont current rx, observe for effect of re-titration of laxatives  2/23 Lost balance mainly due to behavior however to be safe will lower haldol and Klonopin modestly and observe. Otherwise generally manageable  2/24: No PRNs needed overnight. Compliant with meds. As per staff last night pt was banging her Bible against the wall and lost her balance, staff held her. Pt remains talkative and intrusive at times but overall calmer and redirectable.   2/25: remains labile, loud, easily agitated but more redirectable.  2/26 Patient seen by medicine no evidence injury needing imaging, just Tylenol prn. Will cont current meds, if anteroflexion dosesn't improve with recent drop in haldol will consider further dose reduction  2/27 Has had slow increase in EPS despite no increase in dose and some reduction will hold haldol to reduce EPS and restart at lower dose  2/28 Patient with change in med tolerance some agitation and sedation. Will hold haldol tonight consider changing to Prolixin as may be a little better for EPS and will check labs to look for any medical issue  2/29 Labile irritable, increased tremor improved with washout of haldol. Will restart haldol but see if she can be stabilized at low dose to minimize tremor. COuld consider increasing Seroquel  3/1 Improved will see if she can stabilize on Seroquel and low dose haldol to minimize EPS will address nasal symptoms  3/2 Patient better less bent tremor less calmer. Attempt to find dose of haldol that control psychosis agitation with manageable tremor. May need to try 2mg bid as 1mg big may be subtherapeutic  3/4 Some improvement in that less agitated but EPS better will cont with low dose haldol Issues is that both side effects and decompenstion have had long lags related to dose changes so unclear if at haldol 1mg bid she is better EPS wise but will gradually decompensate. Cont to observe consider increase to 1mg tid. Patient's 2PC expiring she remains delusional disorganized not yet able to managed in SNF will apply for further retention  3/5 Showing some improvement with less agitation despite delusions but also less EPS with lowered haldol. Cont meds will reduce klonopin from 0.25mg tid to bid to reduce polypharm cont other meds. If calmer less disorganized will ask PT if she can walker trial  3/6: behavior in better control, delusional, suspected AH. taking meds.   3/7: can become irritable, redirectable. c/w current meds, ambulating hunched over.   3/8: went to interventional Urology on Fri, UA ordered, ultrasound of bladder scheduled for Monday at 10 AM,  and ob gyn for pessary ordered. If blood in urine—send back to urology office for cysto (please see the full consult in chart)  3/9: Somewhat sedated this AM, calm, not agitated, will follow and adjust meds as necessary (clonazepam)   3/10: More alert today, suspicious of examiner, UA negative  3/11: Mistrustfulness remains, leading to irritability with staff; no prns given   3/12: Labile and irritability noted, poor insight and requires continued inpatient psychiatric hospitalization for safety and stabilization.  3/13: labile, irritable on approach, redirectable. c/w current meds. Delusions noted on exam.  3/14: Labile and irritability noted, poor insight and requires continued inpatient psychiatric hospitalization for safety and stabilization.  3/15: labile, irritable on approach, redirectable. c/w current meds.  3/18 doing poor on regimen where haldol on low dose ineffective or causes sig tremor and anterflexion on higher doses. Will stop and review alternatives such as prolixin or risperidone  3/19 Patient with less sedation , still sig trremor. Delusional irritable, in altercation with roommate sees her room as belonging solely to her. Cont off haldol consider second antipsychotic has been on olanzapine, Prolixin, risperidone, she believes has not been on Abilify will consider this as trial with option for SHAFER  3/20 Psychotic irritable disorganized may go back to trial of olanzapine to allow for monotherapy and less EPS. Spoke with cally will lower Valtrex as dose is above recommended for maintenance  3/21 Louder more intrusive. Will titrate olanzapine ands use prns target 20-30mg, may need second antipsychotic given treatment resistance and clozapine not feasible  3/22 Patient sl calmer with po and prn olanzapine still lous intrusive irritable, needing prns. will increase to 5mg tid. Plan when calmer attempt to simplify regimen to bid to improve compliance.  3/23: no PRN overnight/this am. Noted walking in dayroom, calmer, no aggression now.  3/24: s/p fall, no injury, noted walking in dining area, remains hostile, easily agitated and uncooperative.   3/25 Patient loud irritable  but somewhat less disorganized. Seen by PT not assessed as sig Parkinsonized. will cont meds, will try to check VPA level in AM. Feel due to reduced disorganization patient no longer requires  will place on routine checks , will check labs including VPa level in AM  3/26 Modestly better on olanzpine. Cont trial consider increasing to 10mg bid.  Reorder labs when patient more cooperative  3/27 Modest improvement, cont olanzapine but will now move to bid dosing also Depakote and Sinemet will be made bid to decrease number of times per day she needs to be approached for medication  3/28 Modest gains from olanzapine with less EPS than when on haloperidol. COnt treatment will increase to 10mg bid try to obtain VPA level in AM  3/29 Cont olanzapine trial. Considering other options patient refuses li and likely would be hard to follow with labs. Patient feels Abilify helped in past will consider rechallenge with cross taper  3/30 Poor reponse to Zyprexa will change prn to Ativan /fluphenazine consider then standing prolicxin at low dose as only time patient stable was when she was on haldol but then developed EPS at higher dose  3/31 Remains highly agitated. Only time patient better was combo atypical and conventional will add low dose fluphenazine to olanzapine  4/1 Agitated needing prn. Will d/c olanzapine as has been completely ineffective. Will use Prolixin as she appears to respond to conventional antipsychotics but given EPS vulnerability will add Cogentin as there is no other option to manage EPS  4/2 Loud agitated psychotic no worse off olanzapine will increase fluphenazine to 2mg  bid  4/3 Loud, irritable, Pentecostal delusions, very disorganized behavior such as placing self on floors, placing paper crosses on floor. Will increase Prolixin to 2.5mg bid  4/4 Psychotic , agitated disorganized. Tolerating current Prolixin, plan titrate but need to give time on each dose as she tends to have side effects lags.   4/5: irritable, religiously focused, taking medications.   4/8 Modest gains, reduce need for prns since starting prolixin, cont current dose based on level of EPS may increase to 7.5mg/d vs re add Seroquel  4/9 Somewhat better will hold off on adding another med. Will give more time on each dose based that in past she had delayed side effects  4/10 Patient worse today Needing IM will increase Prolixin as no clear indication EPS worse. If not improving with fluphenazine monotherapy consider as above rechallenge with quetiapine add on  4/12: The patient continues to be psychotic, intermittently compliant with medications.  Intermittent agitation, needing prn's.  Continue Prolixin and Seroquel trial  4/15: The patient continues to be psychotic, more compliant with medications overall.  Intermittent agitation continues.  Will increase Seroquel to 50mg bid.  Continue prolixin.  4/16-17: Calmer overall at times, but remains psychotic and disorganized.  Intermittent agitation continues.  Continue Seroquel and Prolixin.  4/18-9: Remains agitated at times, needing prn  Remains psychotic and disorganized.  Increased Seroquel to 75mg bid, tolerating well.  4/21 Remains psychotic agitated some reduction in prns frequency will increase Seroquel  4/23 psychotic agitated irritable, hypersexual will increase Prolixin. If not improving on current regimen consider adding lithium as patient has manic component  4/24: Still irritable, combative. Will increase Seroquel to 125mg BID.   4/25: Pt refused all meds this morning. No interval changes. Irritable, threatening.   4/26 Patient tenuous agitated refusing meds about 50%. Will use prns for severe agitation/aggression and add TOO hearing to attain more regular compliance  4/27 Agitated psychotic disorganzized erratic compliance. Will go for court meds over objection, consider including clozapine  4/28 Patient a little calmer soft BP adjusted hold parameters on meds affecting BP. Patient willing to consider clozapine will get baseline labs and do REMS if this case  4/29 Very psychotic labile will cont meds and apply for TOO to get better consistency  4/30 Patient psychotic disorganized agitated some EPS as well as some anteroflexion that may have behavioral component cont meds applied for too to provide more consistent treatment   5/1 Psychotic aigtated poor compliance plan encourage meds and have papers in for TOO  5/2 Psychotic disorganized refusing sig amount of meds though is taking anti seizure meds. Plan cont encourage compliance now on court schedule for TOO

## 2024-05-02 NOTE — BH INPATIENT PSYCHIATRY PROGRESS NOTE - MSE UNSTRUCTURED FT
Appearance: casual grooming, poor hygiene;  sig tremor r more than l. Walks with anteroflexed  though less gait though no shuffling or cogwheeling,   Behavior:Poorly cooperative  Speech: sparse irritable in tone, normal rate , quite loud  Mood: "not good"  Affect: irritable, angry, labile  Thought process: disorganized, tangential, loose  Thought content: paranoid accusatory denies SI/HI. Jew delusions discussing Hasidic Jews, focus on pregnancy. Sexual pre-occupation, paranoid about staff killing her baby, feels water supply is poisoned , thinks she needs brain surgery urgently  Perceptual disturbances: no appearance of internal preoccupation  Cognition: adequately oriented  Insight: limited  Judgement: limited

## 2024-05-02 NOTE — BH INPATIENT PSYCHIATRY PROGRESS NOTE - NSBHMETABOLIC_PSY_ALL_CORE_FT
BMI: BMI (kg/m2): 30.2 (01-15-24 @ 12:52)  HbA1c: A1C with Estimated Average Glucose Result: 7.6 % (01-12-24 @ 12:40)    Glucose: POCT Blood Glucose.: 141 mg/dL (05-01-24 @ 08:04)    BP: 133/82 (04-30-24 @ 08:26) (133/82 - 133/82)Vital Signs Last 24 Hrs  T(C): 36.7 (05-02-24 @ 09:30), Max: 36.7 (05-02-24 @ 09:30)  T(F): 98 (05-02-24 @ 09:30), Max: 98 (05-02-24 @ 09:30)  HR: --  BP: --  BP(mean): --  RR: --  SpO2: --    Orthostatic VS  05-02-24 @ 09:30  Lying BP: --/-- HR: --  Sitting BP: 110/60 HR: 70  Standing BP: 118/60 HR: 76  Site: --  Mode: --  Orthostatic VS  05-01-24 @ 07:55  Lying BP: --/-- HR: --  Sitting BP: 133/90 HR: 89  Standing BP: 129/77 HR: 92  Site: --  Mode: --    Lipid Panel:

## 2024-05-02 NOTE — BH INPATIENT PSYCHIATRY PROGRESS NOTE - NSBHCHARTREVIEWVS_PSY_A_CORE FT
Vital Signs Last 24 Hrs  T(C): 36.7 (05-02-24 @ 09:30), Max: 36.7 (05-02-24 @ 09:30)  T(F): 98 (05-02-24 @ 09:30), Max: 98 (05-02-24 @ 09:30)  HR: --  BP: --  BP(mean): --  RR: --  SpO2: --    Orthostatic VS  05-02-24 @ 09:30  Lying BP: --/-- HR: --  Sitting BP: 110/60 HR: 70  Standing BP: 118/60 HR: 76  Site: --  Mode: --  Orthostatic VS  05-01-24 @ 07:55  Lying BP: --/-- HR: --  Sitting BP: 133/90 HR: 89  Standing BP: 129/77 HR: 92  Site: --  Mode: --

## 2024-05-03 LAB — GLUCOSE BLDC GLUCOMTR-MCNC: 214 MG/DL — HIGH (ref 70–99)

## 2024-05-03 PROCEDURE — 99232 SBSQ HOSP IP/OBS MODERATE 35: CPT

## 2024-05-03 RX ORDER — DIPHENHYDRAMINE HCL 50 MG
25 CAPSULE ORAL ONCE
Refills: 0 | Status: COMPLETED | OUTPATIENT
Start: 2024-05-03 | End: 2024-05-03

## 2024-05-03 RX ORDER — FLUPHENAZINE HYDROCHLORIDE 1 MG/1
2 TABLET, FILM COATED ORAL ONCE
Refills: 0 | Status: COMPLETED | OUTPATIENT
Start: 2024-05-03 | End: 2024-05-03

## 2024-05-03 RX ORDER — QUETIAPINE FUMARATE 200 MG/1
100 TABLET, FILM COATED ORAL
Refills: 0 | Status: DISCONTINUED | OUTPATIENT
Start: 2024-05-03 | End: 2024-05-10

## 2024-05-03 RX ADMIN — FLUPHENAZINE HYDROCHLORIDE 2 MILLIGRAM(S): 1 TABLET, FILM COATED ORAL at 07:54

## 2024-05-03 RX ADMIN — FLUPHENAZINE HYDROCHLORIDE 2 MILLIGRAM(S): 1 TABLET, FILM COATED ORAL at 14:18

## 2024-05-03 RX ADMIN — Medication 25 MILLIGRAM(S): at 07:54

## 2024-05-03 RX ADMIN — Medication 25 MILLIGRAM(S): at 14:18

## 2024-05-03 NOTE — BH INPATIENT PSYCHIATRY PROGRESS NOTE - NSBHFUPINTERVALHXFT_PSY_A_CORE
Patient seen for follow up of psychosis with aggression, chart reviewed, and case discussed with treatment team. Vitals WNL but soft  Patient continues to be intermittently agitated and combative, needing prn medication this AM IM, disorganized covering self witrh toothpaste Taking her standing meds very variably resulting in about half of doses missed Labs unremarkable

## 2024-05-03 NOTE — BH INPATIENT PSYCHIATRY PROGRESS NOTE - NSBHASSESSSUMMFT_PSY_ALL_CORE
2/5 Patient showing some improving trend will give more time, if needs more medication titration due to ongoing symptoms will increase Seroquel not haldol  2/6 Improving cont meds, offered prn suppository and simethicone  2/7 Slowly improving will cont current meds, eval need for further increase Seroquel  2/8 Partial response continue meds except will increase Seroquel as was on 250mg/d PTA  2/9 Overall gains, still regresses and becomes difficult to manage will increase Seroquel to 200mg/d in divided dosing  2/12 A little calmer since started on additional mid day, cont other meds w/o change  2/13 Patient better still disorganized, hyper Scientologist but less agitated, cont current treatment  2/14 Improved globally with some symptom variability. Cont current med consider further increment Seroquel  2/15 Improved cont current regimen for now  2/16 Showing  some gains continue treatment for now at current doses   2/20 Improved, cont current rx. Scheduled urogyn for pessary replacement  2/21 Improved with variable intrusiveness. Cont meds will increase Miralax and Senna  2/22 Improving cont current rx, observe for effect of re-titration of laxatives  2/23 Lost balance mainly due to behavior however to be safe will lower haldol and Klonopin modestly and observe. Otherwise generally manageable  2/24: No PRNs needed overnight. Compliant with meds. As per staff last night pt was banging her Bible against the wall and lost her balance, staff held her. Pt remains talkative and intrusive at times but overall calmer and redirectable.   2/25: remains labile, loud, easily agitated but more redirectable.  2/26 Patient seen by medicine no evidence injury needing imaging, just Tylenol prn. Will cont current meds, if anteroflexion dosesn't improve with recent drop in haldol will consider further dose reduction  2/27 Has had slow increase in EPS despite no increase in dose and some reduction will hold haldol to reduce EPS and restart at lower dose  2/28 Patient with change in med tolerance some agitation and sedation. Will hold haldol tonight consider changing to Prolixin as may be a little better for EPS and will check labs to look for any medical issue  2/29 Labile irritable, increased tremor improved with washout of haldol. Will restart haldol but see if she can be stabilized at low dose to minimize tremor. COuld consider increasing Seroquel  3/1 Improved will see if she can stabilize on Seroquel and low dose haldol to minimize EPS will address nasal symptoms  3/2 Patient better less bent tremor less calmer. Attempt to find dose of haldol that control psychosis agitation with manageable tremor. May need to try 2mg bid as 1mg big may be subtherapeutic  3/4 Some improvement in that less agitated but EPS better will cont with low dose haldol Issues is that both side effects and decompenstion have had long lags related to dose changes so unclear if at haldol 1mg bid she is better EPS wise but will gradually decompensate. Cont to observe consider increase to 1mg tid. Patient's 2PC expiring she remains delusional disorganized not yet able to managed in SNF will apply for further retention  3/5 Showing some improvement with less agitation despite delusions but also less EPS with lowered haldol. Cont meds will reduce klonopin from 0.25mg tid to bid to reduce polypharm cont other meds. If calmer less disorganized will ask PT if she can walker trial  3/6: behavior in better control, delusional, suspected AH. taking meds.   3/7: can become irritable, redirectable. c/w current meds, ambulating hunched over.   3/8: went to interventional Urology on Fri, UA ordered, ultrasound of bladder scheduled for Monday at 10 AM,  and ob gyn for pessary ordered. If blood in urine—send back to urology office for cysto (please see the full consult in chart)  3/9: Somewhat sedated this AM, calm, not agitated, will follow and adjust meds as necessary (clonazepam)   3/10: More alert today, suspicious of examiner, UA negative  3/11: Mistrustfulness remains, leading to irritability with staff; no prns given   3/12: Labile and irritability noted, poor insight and requires continued inpatient psychiatric hospitalization for safety and stabilization.  3/13: labile, irritable on approach, redirectable. c/w current meds. Delusions noted on exam.  3/14: Labile and irritability noted, poor insight and requires continued inpatient psychiatric hospitalization for safety and stabilization.  3/15: labile, irritable on approach, redirectable. c/w current meds.  3/18 doing poor on regimen where haldol on low dose ineffective or causes sig tremor and anterflexion on higher doses. Will stop and review alternatives such as prolixin or risperidone  3/19 Patient with less sedation , still sig trremor. Delusional irritable, in altercation with roommate sees her room as belonging solely to her. Cont off haldol consider second antipsychotic has been on olanzapine, Prolixin, risperidone, she believes has not been on Abilify will consider this as trial with option for SHAFER  3/20 Psychotic irritable disorganized may go back to trial of olanzapine to allow for monotherapy and less EPS. Spoke with cally will lower Valtrex as dose is above recommended for maintenance  3/21 Louder more intrusive. Will titrate olanzapine ands use prns target 20-30mg, may need second antipsychotic given treatment resistance and clozapine not feasible  3/22 Patient sl calmer with po and prn olanzapine still lous intrusive irritable, needing prns. will increase to 5mg tid. Plan when calmer attempt to simplify regimen to bid to improve compliance.  3/23: no PRN overnight/this am. Noted walking in dayroom, calmer, no aggression now.  3/24: s/p fall, no injury, noted walking in dining area, remains hostile, easily agitated and uncooperative.   3/25 Patient loud irritable  but somewhat less disorganized. Seen by PT not assessed as sig Parkinsonized. will cont meds, will try to check VPA level in AM. Feel due to reduced disorganization patient no longer requires  will place on routine checks , will check labs including VPa level in AM  3/26 Modestly better on olanzpine. Cont trial consider increasing to 10mg bid.  Reorder labs when patient more cooperative  3/27 Modest improvement, cont olanzapine but will now move to bid dosing also Depakote and Sinemet will be made bid to decrease number of times per day she needs to be approached for medication  3/28 Modest gains from olanzapine with less EPS than when on haloperidol. COnt treatment will increase to 10mg bid try to obtain VPA level in AM  3/29 Cont olanzapine trial. Considering other options patient refuses li and likely would be hard to follow with labs. Patient feels Abilify helped in past will consider rechallenge with cross taper  3/30 Poor reponse to Zyprexa will change prn to Ativan /fluphenazine consider then standing prolicxin at low dose as only time patient stable was when she was on haldol but then developed EPS at higher dose  3/31 Remains highly agitated. Only time patient better was combo atypical and conventional will add low dose fluphenazine to olanzapine  4/1 Agitated needing prn. Will d/c olanzapine as has been completely ineffective. Will use Prolixin as she appears to respond to conventional antipsychotics but given EPS vulnerability will add Cogentin as there is no other option to manage EPS  4/2 Loud agitated psychotic no worse off olanzapine will increase fluphenazine to 2mg  bid  4/3 Loud, irritable, Scientologist delusions, very disorganized behavior such as placing self on floors, placing paper crosses on floor. Will increase Prolixin to 2.5mg bid  4/4 Psychotic , agitated disorganized. Tolerating current Prolixin, plan titrate but need to give time on each dose as she tends to have side effects lags.   4/5: irritable, religiously focused, taking medications.   4/8 Modest gains, reduce need for prns since starting prolixin, cont current dose based on level of EPS may increase to 7.5mg/d vs re add Seroquel  4/9 Somewhat better will hold off on adding another med. Will give more time on each dose based that in past she had delayed side effects  4/10 Patient worse today Needing IM will increase Prolixin as no clear indication EPS worse. If not improving with fluphenazine monotherapy consider as above rechallenge with quetiapine add on  4/12: The patient continues to be psychotic, intermittently compliant with medications.  Intermittent agitation, needing prn's.  Continue Prolixin and Seroquel trial  4/15: The patient continues to be psychotic, more compliant with medications overall.  Intermittent agitation continues.  Will increase Seroquel to 50mg bid.  Continue prolixin.  4/16-17: Calmer overall at times, but remains psychotic and disorganized.  Intermittent agitation continues.  Continue Seroquel and Prolixin.  4/18-9: Remains agitated at times, needing prn  Remains psychotic and disorganized.  Increased Seroquel to 75mg bid, tolerating well.  4/21 Remains psychotic agitated some reduction in prns frequency will increase Seroquel  4/23 psychotic agitated irritable, hypersexual will increase Prolixin. If not improving on current regimen consider adding lithium as patient has manic component  4/24: Still irritable, combative. Will increase Seroquel to 125mg BID.   4/25: Pt refused all meds this morning. No interval changes. Irritable, threatening.   4/26 Patient tenuous agitated refusing meds about 50%. Will use prns for severe agitation/aggression and add TOO hearing to attain more regular compliance  4/27 Agitated psychotic disorganzized erratic compliance. Will go for court meds over objection, consider including clozapine  4/28 Patient a little calmer soft BP adjusted hold parameters on meds affecting BP. Patient willing to consider clozapine will get baseline labs and do REMS if this case  4/29 Very psychotic labile will cont meds and apply for TOO to get better consistency  4/30 Patient psychotic disorganized agitated some EPS as well as some anteroflexion that may have behavioral component cont meds applied for too to provide more consistent treatment   5/1 Psychotic aigtated poor compliance plan encourage meds and have papers in for TOO  5/2 Psychotic disorganized refusing sig amount of meds though is taking anti seizure meds. Plan cont encourage compliance now on court schedule for TOO  5/3 Psychotic manic , cont encourage meds especially Depakote as she is at risk for sz off anti seizure med.

## 2024-05-03 NOTE — BH INPATIENT PSYCHIATRY PROGRESS NOTE - NSBHCHARTREVIEWVS_PSY_A_CORE FT
Vital Signs Last 24 Hrs  T(C): --  T(F): --  HR: --  BP: --  BP(mean): --  RR: --  SpO2: --    Orthostatic VS  05-02-24 @ 09:30  Lying BP: --/-- HR: --  Sitting BP: 110/60 HR: 70  Standing BP: 118/60 HR: 76  Site: --  Mode: --

## 2024-05-03 NOTE — BH INPATIENT PSYCHIATRY PROGRESS NOTE - MSE UNSTRUCTURED FT
Appearance: casual grooming, poor hygiene;  sig tremor r more than l. Walks with anteroflexed  though less gait though no shuffling or cogwheeling,   Behavior:Poorly cooperative  Speech: sparse irritable in tone, normal rate , quite loud  Mood: "not good"  Affect: irritable, angry, labile  Thought process: disorganized, tangential, loose  Thought content: paranoid accusatory denies SI/HI. Amish delusions discussing Hasidic Jews, focus on pregnancy. Sexual pre-occupation, paranoid about staff killing her baby, feels water supply is poisoned , thinks she needs brain surgery urgently  Perceptual disturbances: no appearance of internal preoccupation  Cognition: adequately oriented  Insight: limited  Judgement: limited

## 2024-05-03 NOTE — BH INPATIENT PSYCHIATRY PROGRESS NOTE - NSBHMETABOLIC_PSY_ALL_CORE_FT
BMI: BMI (kg/m2): 30.2 (01-15-24 @ 12:52)  HbA1c: A1C with Estimated Average Glucose Result: 7.6 % (01-12-24 @ 12:40)    Glucose: POCT Blood Glucose.: 141 mg/dL (05-01-24 @ 08:04)    BP: --Vital Signs Last 24 Hrs  T(C): --  T(F): --  HR: --  BP: --  BP(mean): --  RR: --  SpO2: --    Orthostatic VS  05-02-24 @ 09:30  Lying BP: --/-- HR: --  Sitting BP: 110/60 HR: 70  Standing BP: 118/60 HR: 76  Site: --  Mode: --    Lipid Panel:

## 2024-05-04 PROCEDURE — 99232 SBSQ HOSP IP/OBS MODERATE 35: CPT

## 2024-05-04 RX ORDER — DIPHENHYDRAMINE HCL 50 MG
25 CAPSULE ORAL ONCE
Refills: 0 | Status: COMPLETED | OUTPATIENT
Start: 2024-05-04 | End: 2024-05-04

## 2024-05-04 RX ORDER — FLUPHENAZINE HYDROCHLORIDE 1 MG/1
5 TABLET, FILM COATED ORAL EVERY 6 HOURS
Refills: 0 | Status: DISCONTINUED | OUTPATIENT
Start: 2024-05-04 | End: 2024-05-11

## 2024-05-04 RX ORDER — FLUPHENAZINE HYDROCHLORIDE 1 MG/1
2 TABLET, FILM COATED ORAL ONCE
Refills: 0 | Status: COMPLETED | OUTPATIENT
Start: 2024-05-04 | End: 2024-05-04

## 2024-05-04 RX ADMIN — QUETIAPINE FUMARATE 100 MILLIGRAM(S): 200 TABLET, FILM COATED ORAL at 22:40

## 2024-05-04 RX ADMIN — FLUPHENAZINE HYDROCHLORIDE 2.5 MILLIGRAM(S): 1 TABLET, FILM COATED ORAL at 03:47

## 2024-05-04 RX ADMIN — CARBIDOPA AND LEVODOPA 1.5 TABLET(S): 25; 100 TABLET ORAL at 22:41

## 2024-05-04 RX ADMIN — DIVALPROEX SODIUM 750 MILLIGRAM(S): 500 TABLET, DELAYED RELEASE ORAL at 11:05

## 2024-05-04 RX ADMIN — Medication 1 MILLIGRAM(S): at 11:04

## 2024-05-04 RX ADMIN — Medication 1 MILLIGRAM(S): at 22:42

## 2024-05-04 RX ADMIN — CARBIDOPA AND LEVODOPA 1.5 TABLET(S): 25; 100 TABLET ORAL at 11:03

## 2024-05-04 RX ADMIN — Medication 1 TABLET(S): at 22:41

## 2024-05-04 RX ADMIN — Medication 25 MILLIGRAM(S): at 04:43

## 2024-05-04 RX ADMIN — FLUPHENAZINE HYDROCHLORIDE 2 MILLIGRAM(S): 1 TABLET, FILM COATED ORAL at 04:43

## 2024-05-04 RX ADMIN — FLUPHENAZINE HYDROCHLORIDE 5 MILLIGRAM(S): 1 TABLET, FILM COATED ORAL at 11:03

## 2024-05-04 RX ADMIN — METFORMIN HYDROCHLORIDE 500 MILLIGRAM(S): 850 TABLET ORAL at 22:43

## 2024-05-04 RX ADMIN — SENNA PLUS 2 TABLET(S): 8.6 TABLET ORAL at 22:42

## 2024-05-04 RX ADMIN — DIVALPROEX SODIUM 750 MILLIGRAM(S): 500 TABLET, DELAYED RELEASE ORAL at 22:40

## 2024-05-04 RX ADMIN — QUETIAPINE FUMARATE 100 MILLIGRAM(S): 200 TABLET, FILM COATED ORAL at 11:05

## 2024-05-04 RX ADMIN — FLUPHENAZINE HYDROCHLORIDE 5 MILLIGRAM(S): 1 TABLET, FILM COATED ORAL at 22:41

## 2024-05-04 NOTE — BH INPATIENT PSYCHIATRY PROGRESS NOTE - MSE UNSTRUCTURED FT
Appearance: casual grooming, poor hygiene;  sig tremor r more than l. Walks with anteroflexed  though less gait though no shuffling or cogwheeling,   Behavior:Poorly cooperative  Speech: sparse irritable in tone, normal rate , quite loud  Mood: "not good"  Affect: irritable, angry, labile  Thought process: disorganized, tangential, loose  Thought content: paranoid accusatory denies SI/HI. Samaritan delusions discussing Hasidic Jews, focus on pregnancy. Sexual pre-occupation, paranoid about staff killing her baby, feels water supply is poisoned , thinks she needs brain surgery urgently  Perceptual disturbances: no appearance of internal preoccupation  Cognition: adequately oriented  Insight: limited  Judgement: limited

## 2024-05-04 NOTE — BH INPATIENT PSYCHIATRY PROGRESS NOTE - PRN MEDS
MEDICATIONS  (PRN):  acetaminophen     Tablet .. 650 milliGRAM(s) Oral every 6 hours PRN Mild Pain (1 - 3), Moderate Pain (4 - 6)  albuterol    90 MICROgram(s) HFA Inhaler 2 Puff(s) Inhalation every 6 hours PRN Shortness of Breath and/or Wheezing  aluminum hydroxide/magnesium hydroxide/simethicone Suspension 30 milliLiter(s) Oral every 6 hours PRN Dyspepsia  bisacodyl Suppository 10 milliGRAM(s) Rectal daily PRN constipation  dextrose Oral Gel 15 Gram(s) Oral once PRN Blood Glucose LESS THAN 70 milliGRAM(s)/deciliter  diphenhydrAMINE Injectable 25 milliGRAM(s) IntraMuscular once PRN agitation  fluPHENAZine 5 milliGRAM(s) Oral every 6 hours PRN agitation/aggression  fluPHENAZine  Injectable 2 milliGRAM(s) IntraMuscular once PRN agitation  simethicone 80 milliGRAM(s) Chew every 8 hours PRN gas  sodium chloride 0.65% Nasal 2 Spray(s) Both Nostrils every 6 hours PRN congestion
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
## 2024-05-04 NOTE — BH INPATIENT PSYCHIATRY PROGRESS NOTE - NSBHANTIPSYCHOTIC_PSY_ALL_CORE
Yes...

## 2024-05-04 NOTE — BH INPATIENT PSYCHIATRY PROGRESS NOTE - NSBHFUPMEDSE_PSY_A_CORE
None known

## 2024-05-04 NOTE — BH INPATIENT PSYCHIATRY PROGRESS NOTE - NSBHMETABOLIC_PSY_ALL_CORE_FT
BMI: BMI (kg/m2): 30.2 (01-15-24 @ 12:52)  HbA1c: A1C with Estimated Average Glucose Result: 7.6 % (01-12-24 @ 12:40)    Glucose: POCT Blood Glucose.: 214 mg/dL (05-03-24 @ 20:19)    BP: --Vital Signs Last 24 Hrs  T(C): --  T(F): --  HR: --  BP: --  BP(mean): --  RR: --  SpO2: --    Orthostatic VS  05-02-24 @ 09:30  Lying BP: --/-- HR: --  Sitting BP: 110/60 HR: 70  Standing BP: 118/60 HR: 76  Site: --  Mode: --    Lipid Panel:

## 2024-05-04 NOTE — BH INPATIENT PSYCHIATRY PROGRESS NOTE - CURRENT MEDICATION
MEDICATIONS  (STANDING):  ammonium lactate 12% Lotion 1 Application(s) Topical two times a day  benztropine 1 milliGRAM(s) Oral two times a day  carbidopa/levodopa  25/100 1.5 Tablet(s) Oral <User Schedule>  cholecalciferol 400 Unit(s) Oral daily  divalproex Sprinkle 750 milliGRAM(s) Oral two times a day  fluPHENAZine 5 milliGRAM(s) Oral two times a day  glucagon  Injectable 1 milliGRAM(s) IntraMuscular once  hemorrhoidal Ointment 1 Application(s) Rectal daily  hydrocortisone 2.5% Ointment 1 Application(s) Topical two times a day  insulin lispro (ADMELOG) corrective regimen sliding scale   SubCutaneous three times a day before meals  levothyroxine 75 MICROGram(s) Oral daily  lisinopril 20 milliGRAM(s) Oral daily  metFORMIN 500 milliGRAM(s) Oral two times a day  metoprolol succinate ER 50 milliGRAM(s) Oral daily  multivitamin 1 Tablet(s) Oral at bedtime  polyethylene glycol 3350 17 Gram(s) Oral daily  QUEtiapine 100 milliGRAM(s) Oral two times a day  senna 2 Tablet(s) Oral at bedtime    MEDICATIONS  (PRN):  acetaminophen     Tablet .. 650 milliGRAM(s) Oral every 6 hours PRN Mild Pain (1 - 3), Moderate Pain (4 - 6)  albuterol    90 MICROgram(s) HFA Inhaler 2 Puff(s) Inhalation every 6 hours PRN Shortness of Breath and/or Wheezing  aluminum hydroxide/magnesium hydroxide/simethicone Suspension 30 milliLiter(s) Oral every 6 hours PRN Dyspepsia  bisacodyl Suppository 10 milliGRAM(s) Rectal daily PRN constipation  dextrose Oral Gel 15 Gram(s) Oral once PRN Blood Glucose LESS THAN 70 milliGRAM(s)/deciliter  diphenhydrAMINE Injectable 25 milliGRAM(s) IntraMuscular once PRN agitation  fluPHENAZine 5 milliGRAM(s) Oral every 6 hours PRN agitation/aggression  fluPHENAZine  Injectable 2 milliGRAM(s) IntraMuscular once PRN agitation  simethicone 80 milliGRAM(s) Chew every 8 hours PRN gas  sodium chloride 0.65% Nasal 2 Spray(s) Both Nostrils every 6 hours PRN congestion
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
## 2024-05-05 PROCEDURE — 99232 SBSQ HOSP IP/OBS MODERATE 35: CPT

## 2024-05-05 RX ADMIN — Medication 75 MICROGRAM(S): at 06:35

## 2024-05-05 RX ADMIN — Medication 30 MILLILITER(S): at 17:54
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
## 2024-05-05 NOTE — BH INPATIENT PSYCHIATRY PROGRESS NOTE - MSE UNSTRUCTURED FT
Appearance: casual grooming, poor hygiene;    Behavior: superficially cooperative  Speech: wnl  Mood: ok"  Affect: neutral now but labile  Thought process: disorganized, tangential, loose  Thought content: paranoid accusatory denies SI/HI. Baptist delusions discussing Hasidic Jews, focus on pregnancy. Sexual pre-occupation, paranoid about staff killing her baby, feels water supply is poisoned , thinks she needs brain surgery urgently  Perceptual disturbances: no appearance of internal preoccupation  Cognition: adequately oriented  Insight: limited  Judgement: limited
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
## 2024-05-06 PROCEDURE — 99232 SBSQ HOSP IP/OBS MODERATE 35: CPT
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
## 2024-05-06 NOTE — BH INPATIENT PSYCHIATRY PROGRESS NOTE - NSBHMETABOLIC_PSY_ALL_CORE_FT
BMI: BMI (kg/m2): 30.2 (01-15-24 @ 12:52)  HbA1c: A1C with Estimated Average Glucose Result: 7.6 % (01-12-24 @ 12:40)    Glucose: POCT Blood Glucose.: 214 mg/dL (05-03-24 @ 20:19)    BP: --Vital Signs Last 24 Hrs  T(C): --  T(F): --  HR: --  BP: --  BP(mean): --  RR: --  SpO2: --      Lipid Panel:
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
## 2024-05-07 LAB — GLUCOSE BLDC GLUCOMTR-MCNC: 203 MG/DL — HIGH (ref 70–99)

## 2024-05-07 PROCEDURE — 99232 SBSQ HOSP IP/OBS MODERATE 35: CPT

## 2024-05-07 RX ORDER — FLUPHENAZINE HYDROCHLORIDE 1 MG/1
2 TABLET, FILM COATED ORAL
Refills: 0 | Status: DISCONTINUED | OUTPATIENT
Start: 2024-05-07 | End: 2024-05-10

## 2024-05-07 RX ORDER — FLUPHENAZINE HYDROCHLORIDE 1 MG/1
2 TABLET, FILM COATED ORAL ONCE
Refills: 0 | Status: COMPLETED | OUTPATIENT
Start: 2024-05-07 | End: 2024-05-07

## 2024-05-07 RX ADMIN — CARBIDOPA AND LEVODOPA 1.5 TABLET(S): 25; 100 TABLET ORAL at 21:21

## 2024-05-07 RX ADMIN — Medication 1 TABLET(S): at 21:21

## 2024-05-07 RX ADMIN — METFORMIN HYDROCHLORIDE 500 MILLIGRAM(S): 850 TABLET ORAL at 10:08

## 2024-05-07 RX ADMIN — FLUPHENAZINE HYDROCHLORIDE 2 MILLIGRAM(S): 1 TABLET, FILM COATED ORAL at 21:58

## 2024-05-07 RX ADMIN — LISINOPRIL 20 MILLIGRAM(S): 2.5 TABLET ORAL at 10:08

## 2024-05-07 RX ADMIN — Medication 1 APPLICATION(S): at 12:35

## 2024-05-07 RX ADMIN — Medication 50 MILLIGRAM(S): at 10:08

## 2024-05-07 RX ADMIN — DIVALPROEX SODIUM 750 MILLIGRAM(S): 500 TABLET, DELAYED RELEASE ORAL at 10:08

## 2024-05-07 RX ADMIN — SENNA PLUS 2 TABLET(S): 8.6 TABLET ORAL at 21:20

## 2024-05-07 RX ADMIN — CARBIDOPA AND LEVODOPA 1.5 TABLET(S): 25; 100 TABLET ORAL at 11:51

## 2024-05-07 RX ADMIN — FLUPHENAZINE HYDROCHLORIDE 5 MILLIGRAM(S): 1 TABLET, FILM COATED ORAL at 10:09

## 2024-05-07 RX ADMIN — DIVALPROEX SODIUM 750 MILLIGRAM(S): 500 TABLET, DELAYED RELEASE ORAL at 21:20

## 2024-05-07 RX ADMIN — Medication 1 MILLIGRAM(S): at 10:09

## 2024-05-07 RX ADMIN — QUETIAPINE FUMARATE 100 MILLIGRAM(S): 200 TABLET, FILM COATED ORAL at 10:09

## 2024-05-07 RX ADMIN — METFORMIN HYDROCHLORIDE 500 MILLIGRAM(S): 850 TABLET ORAL at 21:21

## 2024-05-07 RX ADMIN — Medication 400 UNIT(S): at 10:08

## 2024-05-07 RX ADMIN — Medication 1 MILLIGRAM(S): at 21:21

## 2024-05-07 RX ADMIN — QUETIAPINE FUMARATE 100 MILLIGRAM(S): 200 TABLET, FILM COATED ORAL at 21:21

## 2024-05-07 RX ADMIN — FLUPHENAZINE HYDROCHLORIDE 5 MILLIGRAM(S): 1 TABLET, FILM COATED ORAL at 21:21

## 2024-05-07 NOTE — BH INPATIENT PSYCHIATRY PROGRESS NOTE - NSBHMETABOLIC_PSY_ALL_CORE_FT
BMI: BMI (kg/m2): 30.2 (01-15-24 @ 12:52)  HbA1c: A1C with Estimated Average Glucose Result: 7.6 % (01-12-24 @ 12:40)    Glucose: POCT Blood Glucose.: 214 mg/dL (05-03-24 @ 20:19)    BP: 149/102 (05-07-24 @ 09:31) (149/102 - 149/102)Vital Signs Last 24 Hrs  T(C): --  T(F): --  HR: 90 (05-07-24 @ 09:31) (90 - 90)  BP: 149/102 (05-07-24 @ 09:31) (149/102 - 149/102)  BP(mean): --  RR: --  SpO2: --      Lipid Panel:

## 2024-05-07 NOTE — BH INPATIENT PSYCHIATRY PROGRESS NOTE - NSBHCHARTREVIEWVS_PSY_A_CORE FT
Vital Signs Last 24 Hrs  T(C): --  T(F): --  HR: 90 (05-07-24 @ 09:31) (90 - 90)  BP: 149/102 (05-07-24 @ 09:31) (149/102 - 149/102)  BP(mean): --  RR: --  SpO2: --

## 2024-05-07 NOTE — BH INPATIENT PSYCHIATRY PROGRESS NOTE - NSBHFUPINTERVALHXFT_PSY_A_CORE
Patient seen for SAD. No major events overnight. Patient eating, sleeping okay, compliance erratic, behavior quite disorganized. BP elevated but likely inaccurate due to motion.

## 2024-05-07 NOTE — BH INPATIENT PSYCHIATRY PROGRESS NOTE - MSE UNSTRUCTURED FT
Appearance: casual grooming, poor hygiene;    Behavior: superficially cooperative  Speech: wnl  Mood: ok"  Affect: . irritable labile  Thought process: disorganized, tangential, loose  Thought content: paranoid accusatory denies SI/HI.Yazdanism delusions, grandiose delusions focus on pregnancy. Sexual pre-occupation, paranoid about staff killing her baby, feels water supply is poisoned , thinks she needs brain surgery urgently  Perceptual disturbances: no appearance of internal preoccupation  Cognition: adequately oriented  Insight: limited  Judgement: limited
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
## 2024-05-07 NOTE — BH INPATIENT PSYCHIATRY PROGRESS NOTE - CURRENT MEDICATION
MEDICATIONS  (STANDING):  ammonium lactate 12% Lotion 1 Application(s) Topical two times a day  benztropine 1 milliGRAM(s) Oral two times a day  carbidopa/levodopa  25/100 1.5 Tablet(s) Oral <User Schedule>  cholecalciferol 400 Unit(s) Oral daily  divalproex Sprinkle 750 milliGRAM(s) Oral two times a day  fluPHENAZine 5 milliGRAM(s) Oral two times a day  glucagon  Injectable 1 milliGRAM(s) IntraMuscular once  hemorrhoidal Ointment 1 Application(s) Rectal daily  hydrocortisone 2.5% Ointment 1 Application(s) Topical two times a day  insulin lispro (ADMELOG) corrective regimen sliding scale   SubCutaneous three times a day before meals  levothyroxine 75 MICROGram(s) Oral daily  lisinopril 20 milliGRAM(s) Oral daily  metFORMIN 500 milliGRAM(s) Oral two times a day  metoprolol succinate ER 50 milliGRAM(s) Oral daily  multivitamin 1 Tablet(s) Oral at bedtime  polyethylene glycol 3350 17 Gram(s) Oral daily  QUEtiapine 100 milliGRAM(s) Oral two times a day  senna 2 Tablet(s) Oral at bedtime    MEDICATIONS  (PRN):  acetaminophen     Tablet .. 650 milliGRAM(s) Oral every 6 hours PRN Mild Pain (1 - 3), Moderate Pain (4 - 6)  albuterol    90 MICROgram(s) HFA Inhaler 2 Puff(s) Inhalation every 6 hours PRN Shortness of Breath and/or Wheezing  aluminum hydroxide/magnesium hydroxide/simethicone Suspension 30 milliLiter(s) Oral every 6 hours PRN Dyspepsia  bisacodyl Suppository 10 milliGRAM(s) Rectal daily PRN constipation  dextrose Oral Gel 15 Gram(s) Oral once PRN Blood Glucose LESS THAN 70 milliGRAM(s)/deciliter  diphenhydrAMINE Injectable 25 milliGRAM(s) IntraMuscular once PRN agitation  fluPHENAZine 5 milliGRAM(s) Oral every 6 hours PRN agitation/aggression  fluPHENAZine  Injectable 2 milliGRAM(s) IntraMuscular two times a day PRN medication refusal as per court order  fluPHENAZine  Injectable 2 milliGRAM(s) IntraMuscular once PRN agitation  simethicone 80 milliGRAM(s) Chew every 8 hours PRN gas  sodium chloride 0.65% Nasal 2 Spray(s) Both Nostrils every 6 hours PRN congestion

## 2024-05-07 NOTE — BH INPATIENT PSYCHIATRY PROGRESS NOTE - PRN MEDS
MEDICATIONS  (PRN):  acetaminophen     Tablet .. 650 milliGRAM(s) Oral every 6 hours PRN Mild Pain (1 - 3), Moderate Pain (4 - 6)  albuterol    90 MICROgram(s) HFA Inhaler 2 Puff(s) Inhalation every 6 hours PRN Shortness of Breath and/or Wheezing  aluminum hydroxide/magnesium hydroxide/simethicone Suspension 30 milliLiter(s) Oral every 6 hours PRN Dyspepsia  bisacodyl Suppository 10 milliGRAM(s) Rectal daily PRN constipation  dextrose Oral Gel 15 Gram(s) Oral once PRN Blood Glucose LESS THAN 70 milliGRAM(s)/deciliter  diphenhydrAMINE Injectable 25 milliGRAM(s) IntraMuscular once PRN agitation  fluPHENAZine 5 milliGRAM(s) Oral every 6 hours PRN agitation/aggression  fluPHENAZine  Injectable 2 milliGRAM(s) IntraMuscular two times a day PRN medication refusal as per court order  fluPHENAZine  Injectable 2 milliGRAM(s) IntraMuscular once PRN agitation  simethicone 80 milliGRAM(s) Chew every 8 hours PRN gas  sodium chloride 0.65% Nasal 2 Spray(s) Both Nostrils every 6 hours PRN congestion

## 2024-05-08 LAB — GLUCOSE BLDC GLUCOMTR-MCNC: 300 MG/DL — HIGH (ref 70–99)

## 2024-05-08 PROCEDURE — 99232 SBSQ HOSP IP/OBS MODERATE 35: CPT

## 2024-05-08 RX ORDER — DIPHENHYDRAMINE HCL 50 MG
25 CAPSULE ORAL ONCE
Refills: 0 | Status: COMPLETED | OUTPATIENT
Start: 2024-05-08 | End: 2024-05-08

## 2024-05-08 RX ADMIN — CARBIDOPA AND LEVODOPA 1.5 TABLET(S): 25; 100 TABLET ORAL at 19:58

## 2024-05-08 RX ADMIN — DIVALPROEX SODIUM 750 MILLIGRAM(S): 500 TABLET, DELAYED RELEASE ORAL at 19:59

## 2024-05-08 RX ADMIN — METFORMIN HYDROCHLORIDE 500 MILLIGRAM(S): 850 TABLET ORAL at 09:49

## 2024-05-08 RX ADMIN — CARBIDOPA AND LEVODOPA 1.5 TABLET(S): 25; 100 TABLET ORAL at 09:48

## 2024-05-08 RX ADMIN — QUETIAPINE FUMARATE 100 MILLIGRAM(S): 200 TABLET, FILM COATED ORAL at 19:58

## 2024-05-08 RX ADMIN — Medication 25 MILLIGRAM(S): at 10:30

## 2024-05-08 RX ADMIN — Medication 1 MILLIGRAM(S): at 19:58

## 2024-05-08 RX ADMIN — METFORMIN HYDROCHLORIDE 500 MILLIGRAM(S): 850 TABLET ORAL at 19:58

## 2024-05-08 RX ADMIN — DIVALPROEX SODIUM 750 MILLIGRAM(S): 500 TABLET, DELAYED RELEASE ORAL at 09:48

## 2024-05-08 RX ADMIN — FLUPHENAZINE HYDROCHLORIDE 5 MILLIGRAM(S): 1 TABLET, FILM COATED ORAL at 19:59

## 2024-05-08 RX ADMIN — Medication 75 MICROGRAM(S): at 06:01

## 2024-05-08 RX ADMIN — SENNA PLUS 2 TABLET(S): 8.6 TABLET ORAL at 19:58

## 2024-05-08 RX ADMIN — Medication 1 TABLET(S): at 19:59

## 2024-05-08 RX ADMIN — FLUPHENAZINE HYDROCHLORIDE 2 MILLIGRAM(S): 1 TABLET, FILM COATED ORAL at 10:30

## 2024-05-08 NOTE — BH INPATIENT PSYCHIATRY PROGRESS NOTE - NSBHCHARTREVIEWVS_PSY_A_CORE FT
Vital Signs Last 24 Hrs  T(C): --  T(F): --  HR: --  BP: --  BP(mean): --  RR: --  SpO2: --    Orthostatic VS  05-08-24 @ 10:13  Lying BP: --/-- HR: --  Sitting BP: 170/100 HR: 88  Standing BP: 170/100 HR: 90  Site: --  Mode: --
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
## 2024-05-08 NOTE — BH INPATIENT PSYCHIATRY PROGRESS NOTE - MSE UNSTRUCTURED FT
Appearance: casual grooming, poor hygiene;    Behavior: superficially cooperative  Speech: wnl  Mood: ok"  Affect: . irritable labile  Thought process: disorganized, tangential, loose  Thought content: paranoid accusatory w/o much specifics  Perceptual disturbances: no appearance of internal preoccupation  Cognition: adequately oriented  Insight: limited  Judgement: limited

## 2024-05-08 NOTE — BH INPATIENT PSYCHIATRY PROGRESS NOTE - NSBHMETABOLIC_PSY_ALL_CORE_FT
BMI: BMI (kg/m2): 30.2 (01-15-24 @ 12:52)  HbA1c: A1C with Estimated Average Glucose Result: 7.6 % (01-12-24 @ 12:40)    Glucose: POCT Blood Glucose.: 203 mg/dL (05-07-24 @ 16:31)    BP: 149/102 (05-07-24 @ 09:31) (149/102 - 149/102)Vital Signs Last 24 Hrs  T(C): --  T(F): --  HR: --  BP: --  BP(mean): --  RR: --  SpO2: --    Orthostatic VS  05-08-24 @ 10:13  Lying BP: --/-- HR: --  Sitting BP: 170/100 HR: 88  Standing BP: 170/100 HR: 90  Site: --  Mode: --    Lipid Panel:

## 2024-05-08 NOTE — LEGAL STATUS PROGRESS NOTE - NSLEGALSTATUS_PSY_ALL_CORE
9.27 Involuntary (2PC) Admission
Court Retained
9.27 Involuntary (2PC) Admission
Court Retained
Court Retained
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
## 2024-05-09 PROCEDURE — 99231 SBSQ HOSP IP/OBS SF/LOW 25: CPT

## 2024-05-09 RX ADMIN — METFORMIN HYDROCHLORIDE 500 MILLIGRAM(S): 850 TABLET ORAL at 08:12

## 2024-05-09 RX ADMIN — FLUPHENAZINE HYDROCHLORIDE 5 MILLIGRAM(S): 1 TABLET, FILM COATED ORAL at 08:12

## 2024-05-09 RX ADMIN — Medication 1 MILLIGRAM(S): at 08:10

## 2024-05-09 RX ADMIN — CARBIDOPA AND LEVODOPA 1.5 TABLET(S): 25; 100 TABLET ORAL at 08:11

## 2024-05-09 RX ADMIN — LISINOPRIL 20 MILLIGRAM(S): 2.5 TABLET ORAL at 08:11

## 2024-05-09 RX ADMIN — Medication 400 UNIT(S): at 08:12

## 2024-05-09 RX ADMIN — FLUPHENAZINE HYDROCHLORIDE 2 MILLIGRAM(S): 1 TABLET, FILM COATED ORAL at 21:08

## 2024-05-09 RX ADMIN — Medication 50 MILLIGRAM(S): at 08:12

## 2024-05-09 RX ADMIN — QUETIAPINE FUMARATE 100 MILLIGRAM(S): 200 TABLET, FILM COATED ORAL at 08:11

## 2024-05-09 RX ADMIN — DIVALPROEX SODIUM 750 MILLIGRAM(S): 500 TABLET, DELAYED RELEASE ORAL at 08:11

## 2024-05-09 NOTE — BH INPATIENT PSYCHIATRY PROGRESS NOTE - CURRENT MEDICATION
MEDICATIONS  (STANDING):  ammonium lactate 12% Lotion 1 Application(s) Topical two times a day  benztropine 1 milliGRAM(s) Oral two times a day  carbidopa/levodopa  25/100 1.5 Tablet(s) Oral <User Schedule>  cholecalciferol 400 Unit(s) Oral daily  divalproex Sprinkle 750 milliGRAM(s) Oral two times a day  fluPHENAZine 5 milliGRAM(s) Oral two times a day  glucagon  Injectable 1 milliGRAM(s) IntraMuscular once  hemorrhoidal Ointment 1 Application(s) Rectal daily  hydrocortisone 2.5% Ointment 1 Application(s) Topical two times a day  insulin lispro (ADMELOG) corrective regimen sliding scale   SubCutaneous three times a day before meals  levothyroxine 75 MICROGram(s) Oral daily  lisinopril 20 milliGRAM(s) Oral daily  metFORMIN 500 milliGRAM(s) Oral two times a day  metoprolol succinate ER 50 milliGRAM(s) Oral daily  multivitamin 1 Tablet(s) Oral at bedtime  polyethylene glycol 3350 17 Gram(s) Oral daily  QUEtiapine 100 milliGRAM(s) Oral two times a day  senna 2 Tablet(s) Oral at bedtime    MEDICATIONS  (PRN):  acetaminophen     Tablet .. 650 milliGRAM(s) Oral every 6 hours PRN Mild Pain (1 - 3), Moderate Pain (4 - 6)  albuterol    90 MICROgram(s) HFA Inhaler 2 Puff(s) Inhalation every 6 hours PRN Shortness of Breath and/or Wheezing  aluminum hydroxide/magnesium hydroxide/simethicone Suspension 30 milliLiter(s) Oral every 6 hours PRN Dyspepsia  bisacodyl Suppository 10 milliGRAM(s) Rectal daily PRN constipation  dextrose Oral Gel 15 Gram(s) Oral once PRN Blood Glucose LESS THAN 70 milliGRAM(s)/deciliter  diphenhydrAMINE Injectable 25 milliGRAM(s) IntraMuscular once PRN agitation  fluPHENAZine 5 milliGRAM(s) Oral every 6 hours PRN agitation/aggression  fluPHENAZine  Injectable 2 milliGRAM(s) IntraMuscular once PRN agitation  fluPHENAZine  Injectable 2 milliGRAM(s) IntraMuscular two times a day PRN medication refusal as per court order  simethicone 80 milliGRAM(s) Chew every 8 hours PRN gas  sodium chloride 0.65% Nasal 2 Spray(s) Both Nostrils every 6 hours PRN congestion

## 2024-05-09 NOTE — BH INPATIENT PSYCHIATRY PROGRESS NOTE - NSBHFUPINTERVALHXFT_PSY_A_CORE
Patient seen for SAD. Took her prolixin yesterday. Continues to require PRN medications. During rounds this morning pt was out in the unit, talkative but calm. She approached writer and asked for a new decaf tea because she accidentally dropped her sugar packages inside. Writer gave her a new tea but patient did not even take it and continued on a disorganized ramble. No agitation.
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
## 2024-05-09 NOTE — BH INPATIENT PSYCHIATRY PROGRESS NOTE - PRN MEDS
MEDICATIONS  (PRN):  acetaminophen     Tablet .. 650 milliGRAM(s) Oral every 6 hours PRN Mild Pain (1 - 3), Moderate Pain (4 - 6)  albuterol    90 MICROgram(s) HFA Inhaler 2 Puff(s) Inhalation every 6 hours PRN Shortness of Breath and/or Wheezing  aluminum hydroxide/magnesium hydroxide/simethicone Suspension 30 milliLiter(s) Oral every 6 hours PRN Dyspepsia  bisacodyl Suppository 10 milliGRAM(s) Rectal daily PRN constipation  dextrose Oral Gel 15 Gram(s) Oral once PRN Blood Glucose LESS THAN 70 milliGRAM(s)/deciliter  diphenhydrAMINE Injectable 25 milliGRAM(s) IntraMuscular once PRN agitation  fluPHENAZine 5 milliGRAM(s) Oral every 6 hours PRN agitation/aggression  fluPHENAZine  Injectable 2 milliGRAM(s) IntraMuscular once PRN agitation  fluPHENAZine  Injectable 2 milliGRAM(s) IntraMuscular two times a day PRN medication refusal as per court order  simethicone 80 milliGRAM(s) Chew every 8 hours PRN gas  sodium chloride 0.65% Nasal 2 Spray(s) Both Nostrils every 6 hours PRN congestion

## 2024-05-09 NOTE — BH INPATIENT PSYCHIATRY PROGRESS NOTE - NSBHCHARTREVIEWVS_PSY_A_CORE FT
Vital Signs Last 24 Hrs  T(C): --  T(F): --  HR: --  BP: --  BP(mean): --  RR: 16 (05-08-24 @ 13:57) (16 - 16)  SpO2: --    Orthostatic VS  05-08-24 @ 13:57  Lying BP: --/-- HR: --  Sitting BP: 150/88 HR: 90  Standing BP: 150/90 HR: 88  Site: --  Mode: --  Orthostatic VS  05-08-24 @ 10:13  Lying BP: --/-- HR: --  Sitting BP: 170/100 HR: 88  Standing BP: 170/100 HR: 90  Site: --  Mode: --

## 2024-05-09 NOTE — BH INPATIENT PSYCHIATRY PROGRESS NOTE - NSBHMETABOLIC_PSY_ALL_CORE_FT
BMI: BMI (kg/m2): 30.2 (01-15-24 @ 12:52)  HbA1c: A1C with Estimated Average Glucose Result: 7.6 % (01-12-24 @ 12:40)    Glucose: POCT Blood Glucose.: 300 mg/dL (05-08-24 @ 20:23)    BP: 149/102 (05-07-24 @ 09:31) (149/102 - 149/102)Vital Signs Last 24 Hrs  T(C): --  T(F): --  HR: --  BP: --  BP(mean): --  RR: 16 (05-08-24 @ 13:57) (16 - 16)  SpO2: --    Orthostatic VS  05-08-24 @ 13:57  Lying BP: --/-- HR: --  Sitting BP: 150/88 HR: 90  Standing BP: 150/90 HR: 88  Site: --  Mode: --  Orthostatic VS  05-08-24 @ 10:13  Lying BP: --/-- HR: --  Sitting BP: 170/100 HR: 88  Standing BP: 170/100 HR: 90  Site: --  Mode: --    Lipid Panel:

## 2024-05-10 PROCEDURE — 99232 SBSQ HOSP IP/OBS MODERATE 35: CPT

## 2024-05-10 RX ORDER — QUETIAPINE FUMARATE 200 MG/1
100 TABLET, FILM COATED ORAL
Refills: 0 | Status: DISCONTINUED | OUTPATIENT
Start: 2024-05-10 | End: 2024-05-11

## 2024-05-10 RX ORDER — DIPHENHYDRAMINE HCL 50 MG
25 CAPSULE ORAL ONCE
Refills: 0 | Status: COMPLETED | OUTPATIENT
Start: 2024-05-10 | End: 2024-05-10

## 2024-05-10 RX ORDER — FLUPHENAZINE HYDROCHLORIDE 1 MG/1
5 TABLET, FILM COATED ORAL
Refills: 0 | Status: DISCONTINUED | OUTPATIENT
Start: 2024-05-10 | End: 2024-05-11

## 2024-05-10 RX ADMIN — FLUPHENAZINE HYDROCHLORIDE 5 MILLIGRAM(S): 1 TABLET, FILM COATED ORAL at 10:15

## 2024-05-10 RX ADMIN — FLUPHENAZINE HYDROCHLORIDE 5 MILLIGRAM(S): 1 TABLET, FILM COATED ORAL at 20:42

## 2024-05-10 RX ADMIN — Medication 25 MILLIGRAM(S): at 10:15

## 2024-05-10 NOTE — BH INPATIENT PSYCHIATRY PROGRESS NOTE - NSBHCHARTREVIEWVS_PSY_A_CORE FT
Vital Signs Last 24 Hrs  T(C): --  T(F): --  HR: 87 (05-10-24 @ 09:29) (87 - 87)  BP: 143/82 (05-10-24 @ 09:29) (143/82 - 143/82)  BP(mean): --  RR: --  SpO2: --    Orthostatic VS  05-08-24 @ 13:57  Lying BP: --/-- HR: --  Sitting BP: 150/88 HR: 90  Standing BP: 150/90 HR: 88  Site: --  Mode: --

## 2024-05-10 NOTE — BH INPATIENT PSYCHIATRY PROGRESS NOTE - PRN MEDS
MEDICATIONS  (PRN):  acetaminophen     Tablet .. 650 milliGRAM(s) Oral every 6 hours PRN Mild Pain (1 - 3), Moderate Pain (4 - 6)  albuterol    90 MICROgram(s) HFA Inhaler 2 Puff(s) Inhalation every 6 hours PRN Shortness of Breath and/or Wheezing  aluminum hydroxide/magnesium hydroxide/simethicone Suspension 30 milliLiter(s) Oral every 6 hours PRN Dyspepsia  bisacodyl Suppository 10 milliGRAM(s) Rectal daily PRN constipation  dextrose Oral Gel 15 Gram(s) Oral once PRN Blood Glucose LESS THAN 70 milliGRAM(s)/deciliter  diphenhydrAMINE Injectable 25 milliGRAM(s) IntraMuscular once PRN agitation  fluPHENAZine 5 milliGRAM(s) Oral every 6 hours PRN agitation/aggression  fluPHENAZine  Injectable 5 milliGRAM(s) IntraMuscular two times a day PRN medication refusal as per court order  fluPHENAZine  Injectable 2 milliGRAM(s) IntraMuscular once PRN agitation  simethicone 80 milliGRAM(s) Chew every 8 hours PRN gas  sodium chloride 0.65% Nasal 2 Spray(s) Both Nostrils every 6 hours PRN congestion

## 2024-05-10 NOTE — BH INPATIENT PSYCHIATRY PROGRESS NOTE - NSBHFUPINTERVALHXFT_PSY_A_CORE
Patient seen for SAD. Refused quetiapine and Prolixin today Continues to require PRN medications. During rounds this morning pt was out in the unit,  a little calmer VSS. Patient eating, sleeping fair

## 2024-05-10 NOTE — BH CHART NOTE - NSEVENTNOTEFT_PSY_ALL_CORE
SAUL called after patient refused standing PO medication. Patient has MOO for which IM medication was given. Patient required manual hold from 21:07 to 21:10 for IM medication.   SAUL called around 21:00 after patient refused standing PO medication. Patient has MOO for which Prolixin 2mg IM medication was given. Patient required manual hold from 21:07 to 21:10 for IM medication.

## 2024-05-10 NOTE — BH INPATIENT PSYCHIATRY PROGRESS NOTE - CURRENT MEDICATION
MEDICATIONS  (STANDING):  ammonium lactate 12% Lotion 1 Application(s) Topical two times a day  benztropine 1 milliGRAM(s) Oral two times a day  carbidopa/levodopa  25/100 1.5 Tablet(s) Oral <User Schedule>  cholecalciferol 400 Unit(s) Oral daily  divalproex Sprinkle 750 milliGRAM(s) Oral two times a day  fluPHENAZine 5 milliGRAM(s) Oral two times a day  glucagon  Injectable 1 milliGRAM(s) IntraMuscular once  hemorrhoidal Ointment 1 Application(s) Rectal daily  hydrocortisone 2.5% Ointment 1 Application(s) Topical two times a day  insulin lispro (ADMELOG) corrective regimen sliding scale   SubCutaneous three times a day before meals  levothyroxine 75 MICROGram(s) Oral daily  lisinopril 20 milliGRAM(s) Oral daily  metFORMIN 500 milliGRAM(s) Oral two times a day  metoprolol succinate ER 50 milliGRAM(s) Oral daily  multivitamin 1 Tablet(s) Oral at bedtime  polyethylene glycol 3350 17 Gram(s) Oral daily  QUEtiapine 100 milliGRAM(s) Oral two times a day  senna 2 Tablet(s) Oral at bedtime    MEDICATIONS  (PRN):  acetaminophen     Tablet .. 650 milliGRAM(s) Oral every 6 hours PRN Mild Pain (1 - 3), Moderate Pain (4 - 6)  albuterol    90 MICROgram(s) HFA Inhaler 2 Puff(s) Inhalation every 6 hours PRN Shortness of Breath and/or Wheezing  aluminum hydroxide/magnesium hydroxide/simethicone Suspension 30 milliLiter(s) Oral every 6 hours PRN Dyspepsia  bisacodyl Suppository 10 milliGRAM(s) Rectal daily PRN constipation  dextrose Oral Gel 15 Gram(s) Oral once PRN Blood Glucose LESS THAN 70 milliGRAM(s)/deciliter  diphenhydrAMINE Injectable 25 milliGRAM(s) IntraMuscular once PRN agitation  fluPHENAZine 5 milliGRAM(s) Oral every 6 hours PRN agitation/aggression  fluPHENAZine  Injectable 5 milliGRAM(s) IntraMuscular two times a day PRN medication refusal as per court order  fluPHENAZine  Injectable 2 milliGRAM(s) IntraMuscular once PRN agitation  simethicone 80 milliGRAM(s) Chew every 8 hours PRN gas  sodium chloride 0.65% Nasal 2 Spray(s) Both Nostrils every 6 hours PRN congestion
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
## 2024-05-10 NOTE — BH INPATIENT PSYCHIATRY PROGRESS NOTE - NSBHMETABOLIC_PSY_ALL_CORE_FT
BMI: BMI (kg/m2): 30.2 (01-15-24 @ 12:52)  HbA1c: A1C with Estimated Average Glucose Result: 7.6 % (01-12-24 @ 12:40)    Glucose: POCT Blood Glucose.: 300 mg/dL (05-08-24 @ 20:23)    BP: 143/82 (05-10-24 @ 09:29) (143/82 - 143/82)Vital Signs Last 24 Hrs  T(C): --  T(F): --  HR: 87 (05-10-24 @ 09:29) (87 - 87)  BP: 143/82 (05-10-24 @ 09:29) (143/82 - 143/82)  BP(mean): --  RR: --  SpO2: --    Orthostatic VS  05-08-24 @ 13:57  Lying BP: --/-- HR: --  Sitting BP: 150/88 HR: 90  Standing BP: 150/90 HR: 88  Site: --  Mode: --    Lipid Panel:
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
## 2024-05-11 LAB — GLUCOSE BLDC GLUCOMTR-MCNC: 183 MG/DL — HIGH (ref 70–99)

## 2024-05-11 PROCEDURE — 99232 SBSQ HOSP IP/OBS MODERATE 35: CPT

## 2024-05-11 RX ORDER — FLUPHENAZINE HYDROCHLORIDE 1 MG/1
5 TABLET, FILM COATED ORAL ONCE
Refills: 0 | Status: DISCONTINUED | OUTPATIENT
Start: 2024-05-11 | End: 2024-05-17

## 2024-05-11 RX ORDER — FLUPHENAZINE HYDROCHLORIDE 1 MG/1
2.5 TABLET, FILM COATED ORAL EVERY 6 HOURS
Refills: 0 | Status: DISCONTINUED | OUTPATIENT
Start: 2024-05-11 | End: 2024-05-17

## 2024-05-11 RX ORDER — RISPERIDONE 4 MG/1
0.5 TABLET ORAL ONCE
Refills: 0 | Status: COMPLETED | OUTPATIENT
Start: 2024-05-11 | End: 2024-05-11

## 2024-05-11 RX ORDER — RISPERIDONE 4 MG/1
0.5 TABLET ORAL
Refills: 0 | Status: DISCONTINUED | OUTPATIENT
Start: 2024-05-11 | End: 2024-05-13

## 2024-05-11 RX ADMIN — LISINOPRIL 20 MILLIGRAM(S): 2.5 TABLET ORAL at 09:51

## 2024-05-11 RX ADMIN — Medication 1 MILLIGRAM(S): at 09:51

## 2024-05-11 RX ADMIN — METFORMIN HYDROCHLORIDE 500 MILLIGRAM(S): 850 TABLET ORAL at 09:51

## 2024-05-11 RX ADMIN — RISPERIDONE 0.5 MILLIGRAM(S): 4 TABLET ORAL at 09:50

## 2024-05-11 RX ADMIN — POLYETHYLENE GLYCOL 3350 17 GRAM(S): 17 POWDER, FOR SOLUTION ORAL at 09:52

## 2024-05-11 RX ADMIN — Medication 1 MILLIGRAM(S): at 20:57

## 2024-05-11 RX ADMIN — RISPERIDONE 0.5 MILLIGRAM(S): 4 TABLET ORAL at 20:57

## 2024-05-11 RX ADMIN — Medication 1 TABLET(S): at 20:57

## 2024-05-11 RX ADMIN — Medication 400 UNIT(S): at 09:50

## 2024-05-11 RX ADMIN — CARBIDOPA AND LEVODOPA 1.5 TABLET(S): 25; 100 TABLET ORAL at 09:51

## 2024-05-11 RX ADMIN — Medication 50 MILLIGRAM(S): at 09:51

## 2024-05-11 RX ADMIN — METFORMIN HYDROCHLORIDE 500 MILLIGRAM(S): 850 TABLET ORAL at 20:57

## 2024-05-11 RX ADMIN — Medication 75 MICROGRAM(S): at 06:17

## 2024-05-11 RX ADMIN — DIVALPROEX SODIUM 750 MILLIGRAM(S): 500 TABLET, DELAYED RELEASE ORAL at 20:56

## 2024-05-11 RX ADMIN — DIVALPROEX SODIUM 750 MILLIGRAM(S): 500 TABLET, DELAYED RELEASE ORAL at 09:50

## 2024-05-11 RX ADMIN — SENNA PLUS 2 TABLET(S): 8.6 TABLET ORAL at 20:56

## 2024-05-11 RX ADMIN — CARBIDOPA AND LEVODOPA 1.5 TABLET(S): 25; 100 TABLET ORAL at 20:56
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
## 2024-05-11 NOTE — BH INPATIENT PSYCHIATRY PROGRESS NOTE - NSBHFUPINTERVALHXFT_PSY_A_CORE
Patient seen for SAD. No overnight events. Eating sleeping okay. Not as loud. Patient agreed to try risperidone and took first dose this AM

## 2024-05-11 NOTE — BH INPATIENT PSYCHIATRY PROGRESS NOTE - MSE UNSTRUCTURED FT
Appearance: casual grooming, poor hygiene;    Behavior: superficially cooperative  Activity has tremor anteroflexion suspect some movements are exaggerated when being observed  Speech: wnl  Mood: ok"  Affect: . irritable labile  Thought process: disorganized, tangential, loose  Thought content: paranoid accusatory, says water in her room is poisoned  Perceptual disturbances: no appearance of internal preoccupation  Cognition: adequately oriented  Insight: limited  Judgement: limited

## 2024-05-11 NOTE — BH INPATIENT PSYCHIATRY PROGRESS NOTE - PRN MEDS
MEDICATIONS  (PRN):  acetaminophen     Tablet .. 650 milliGRAM(s) Oral every 6 hours PRN Mild Pain (1 - 3), Moderate Pain (4 - 6)  albuterol    90 MICROgram(s) HFA Inhaler 2 Puff(s) Inhalation every 6 hours PRN Shortness of Breath and/or Wheezing  aluminum hydroxide/magnesium hydroxide/simethicone Suspension 30 milliLiter(s) Oral every 6 hours PRN Dyspepsia  bisacodyl Suppository 10 milliGRAM(s) Rectal daily PRN constipation  dextrose Oral Gel 15 Gram(s) Oral once PRN Blood Glucose LESS THAN 70 milliGRAM(s)/deciliter  diphenhydrAMINE Injectable 25 milliGRAM(s) IntraMuscular once PRN agitation  fluPHENAZine 2.5 milliGRAM(s) Oral every 6 hours PRN agitation  fluPHENAZine  Injectable 5 milliGRAM(s) IntraMuscular once PRN agitation  simethicone 80 milliGRAM(s) Chew every 8 hours PRN gas  sodium chloride 0.65% Nasal 2 Spray(s) Both Nostrils every 6 hours PRN congestion

## 2024-05-11 NOTE — BH INPATIENT PSYCHIATRY PROGRESS NOTE - NSBHMETABOLIC_PSY_ALL_CORE_FT
BMI: BMI (kg/m2): 30.2 (01-15-24 @ 12:52)  HbA1c: A1C with Estimated Average Glucose Result: 7.6 % (01-12-24 @ 12:40)    Glucose: POCT Blood Glucose.: 300 mg/dL (05-08-24 @ 20:23)    BP: 143/82 (05-10-24 @ 09:29) (143/82 - 143/82)Vital Signs Last 24 Hrs  T(C): --  T(F): --  HR: --  BP: --  BP(mean): --  RR: --  SpO2: --    Orthostatic VS  05-11-24 @ 07:41  Lying BP: --/-- HR: --  Sitting BP: 151/92 HR: 84  Standing BP: --/-- HR: --  Site: --  Mode: --    Lipid Panel:

## 2024-05-11 NOTE — BH INPATIENT PSYCHIATRY PROGRESS NOTE - CURRENT MEDICATION
MEDICATIONS  (STANDING):  ammonium lactate 12% Lotion 1 Application(s) Topical two times a day  benztropine 1 milliGRAM(s) Oral two times a day  carbidopa/levodopa  25/100 1.5 Tablet(s) Oral <User Schedule>  cholecalciferol 400 Unit(s) Oral daily  divalproex Sprinkle 750 milliGRAM(s) Oral two times a day  glucagon  Injectable 1 milliGRAM(s) IntraMuscular once  hemorrhoidal Ointment 1 Application(s) Rectal daily  hydrocortisone 2.5% Ointment 1 Application(s) Topical two times a day  insulin lispro (ADMELOG) corrective regimen sliding scale   SubCutaneous three times a day before meals  levothyroxine 75 MICROGram(s) Oral daily  lisinopril 20 milliGRAM(s) Oral daily  metFORMIN 500 milliGRAM(s) Oral two times a day  metoprolol succinate ER 50 milliGRAM(s) Oral daily  multivitamin 1 Tablet(s) Oral at bedtime  polyethylene glycol 3350 17 Gram(s) Oral daily  risperiDONE  Disintegrating Tablet 0.5 milliGRAM(s) Oral two times a day  senna 2 Tablet(s) Oral at bedtime    MEDICATIONS  (PRN):  acetaminophen     Tablet .. 650 milliGRAM(s) Oral every 6 hours PRN Mild Pain (1 - 3), Moderate Pain (4 - 6)  albuterol    90 MICROgram(s) HFA Inhaler 2 Puff(s) Inhalation every 6 hours PRN Shortness of Breath and/or Wheezing  aluminum hydroxide/magnesium hydroxide/simethicone Suspension 30 milliLiter(s) Oral every 6 hours PRN Dyspepsia  bisacodyl Suppository 10 milliGRAM(s) Rectal daily PRN constipation  dextrose Oral Gel 15 Gram(s) Oral once PRN Blood Glucose LESS THAN 70 milliGRAM(s)/deciliter  diphenhydrAMINE Injectable 25 milliGRAM(s) IntraMuscular once PRN agitation  fluPHENAZine 2.5 milliGRAM(s) Oral every 6 hours PRN agitation  fluPHENAZine  Injectable 5 milliGRAM(s) IntraMuscular once PRN agitation  simethicone 80 milliGRAM(s) Chew every 8 hours PRN gas  sodium chloride 0.65% Nasal 2 Spray(s) Both Nostrils every 6 hours PRN congestion

## 2024-05-11 NOTE — BH INPATIENT PSYCHIATRY PROGRESS NOTE - NSBHCHARTREVIEWVS_PSY_A_CORE FT
Vital Signs Last 24 Hrs  T(C): --  T(F): --  HR: --  BP: --  BP(mean): --  RR: --  SpO2: --    Orthostatic VS  05-11-24 @ 07:41  Lying BP: --/-- HR: --  Sitting BP: 151/92 HR: 84  Standing BP: --/-- HR: --  Site: --  Mode: --

## 2024-05-12 LAB — GLUCOSE BLDC GLUCOMTR-MCNC: 191 MG/DL — HIGH (ref 70–99)

## 2024-05-12 PROCEDURE — 99232 SBSQ HOSP IP/OBS MODERATE 35: CPT

## 2024-05-12 RX ADMIN — RISPERIDONE 0.5 MILLIGRAM(S): 4 TABLET ORAL at 10:32

## 2024-05-12 RX ADMIN — Medication 650 MILLIGRAM(S): at 22:56

## 2024-05-12 RX ADMIN — METFORMIN HYDROCHLORIDE 500 MILLIGRAM(S): 850 TABLET ORAL at 21:55

## 2024-05-12 RX ADMIN — Medication 1 TABLET(S): at 21:55

## 2024-05-12 RX ADMIN — Medication 75 MICROGRAM(S): at 06:22

## 2024-05-12 RX ADMIN — LISINOPRIL 20 MILLIGRAM(S): 2.5 TABLET ORAL at 10:32

## 2024-05-12 RX ADMIN — Medication 1 MILLIGRAM(S): at 21:55

## 2024-05-12 RX ADMIN — CARBIDOPA AND LEVODOPA 1.5 TABLET(S): 25; 100 TABLET ORAL at 10:32

## 2024-05-12 RX ADMIN — Medication 50 MILLIGRAM(S): at 10:32

## 2024-05-12 RX ADMIN — CARBIDOPA AND LEVODOPA 1.5 TABLET(S): 25; 100 TABLET ORAL at 21:55

## 2024-05-12 RX ADMIN — Medication 1 MILLIGRAM(S): at 12:56

## 2024-05-12 RX ADMIN — RISPERIDONE 0.5 MILLIGRAM(S): 4 TABLET ORAL at 21:55

## 2024-05-12 RX ADMIN — DIVALPROEX SODIUM 750 MILLIGRAM(S): 500 TABLET, DELAYED RELEASE ORAL at 10:32

## 2024-05-12 RX ADMIN — Medication 1: at 17:01

## 2024-05-12 RX ADMIN — DIVALPROEX SODIUM 750 MILLIGRAM(S): 500 TABLET, DELAYED RELEASE ORAL at 21:54

## 2024-05-12 NOTE — BH INPATIENT PSYCHIATRY PROGRESS NOTE - NSBHMETABOLIC_PSY_ALL_CORE_FT
BMI: BMI (kg/m2): 30.2 (01-15-24 @ 12:52)  HbA1c: A1C with Estimated Average Glucose Result: 7.6 % (01-12-24 @ 12:40)    Glucose: POCT Blood Glucose.: 183 mg/dL (05-11-24 @ 16:49)    BP: 135/105 (05-12-24 @ 08:05) (135/105 - 143/82)Vital Signs Last 24 Hrs  T(C): --  T(F): --  HR: --  BP: 135/105 (05-12-24 @ 08:05) (135/105 - 135/105)  BP(mean): 171 (05-12-24 @ 08:05) (171 - 171)  RR: --  SpO2: --    Orthostatic VS  05-11-24 @ 07:41  Lying BP: --/-- HR: --  Sitting BP: 151/92 HR: 84  Standing BP: --/-- HR: --  Site: --  Mode: --    Lipid Panel:
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
## 2024-05-12 NOTE — BH INPATIENT PSYCHIATRY PROGRESS NOTE - NSBHCHARTREVIEWVS_PSY_A_CORE FT
Vital Signs Last 24 Hrs  T(C): --  T(F): --  HR: --  BP: 135/105 (05-12-24 @ 08:05) (135/105 - 135/105)  BP(mean): 171 (05-12-24 @ 08:05) (171 - 171)  RR: --  SpO2: --    Orthostatic VS  05-11-24 @ 07:41  Lying BP: --/-- HR: --  Sitting BP: 151/92 HR: 84  Standing BP: --/-- HR: --  Site: --  Mode: --

## 2024-05-12 NOTE — BH INPATIENT PSYCHIATRY PROGRESS NOTE - NSBHFUPINTERVALHXFT_PSY_A_CORE
Patient seen for SAD. No overnight events. Eating sleeping okay. Not as loud. Patient agreed to try risperidone and took last night but refusing today so farBP elevated but likely erroneous due to movement

## 2024-05-13 LAB
GLUCOSE BLDC GLUCOMTR-MCNC: 217 MG/DL — HIGH (ref 70–99)
GLUCOSE BLDC GLUCOMTR-MCNC: 248 MG/DL — HIGH (ref 70–99)

## 2024-05-13 PROCEDURE — 99232 SBSQ HOSP IP/OBS MODERATE 35: CPT

## 2024-05-13 RX ORDER — ARIPIPRAZOLE 15 MG/1
5 TABLET ORAL ONCE
Refills: 0 | Status: DISCONTINUED | OUTPATIENT
Start: 2024-05-13 | End: 2024-05-13

## 2024-05-13 RX ORDER — ARIPIPRAZOLE 15 MG/1
5 TABLET ORAL
Refills: 0 | Status: DISCONTINUED | OUTPATIENT
Start: 2024-05-13 | End: 2024-05-13

## 2024-05-13 RX ORDER — FLUPHENAZINE HYDROCHLORIDE 1 MG/1
5 TABLET, FILM COATED ORAL
Refills: 0 | Status: DISCONTINUED | OUTPATIENT
Start: 2024-05-13 | End: 2024-05-17

## 2024-05-13 RX ORDER — ARIPIPRAZOLE 15 MG/1
5 TABLET ORAL
Refills: 0 | Status: DISCONTINUED | OUTPATIENT
Start: 2024-05-13 | End: 2024-05-16

## 2024-05-13 RX ORDER — ARIPIPRAZOLE 15 MG/1
5 TABLET ORAL ONCE
Refills: 0 | Status: COMPLETED | OUTPATIENT
Start: 2024-05-13 | End: 2024-05-13

## 2024-05-13 RX ORDER — RISPERIDONE 4 MG/1
1 TABLET ORAL
Refills: 0 | Status: DISCONTINUED | OUTPATIENT
Start: 2024-05-13 | End: 2024-05-13

## 2024-05-13 RX ADMIN — METFORMIN HYDROCHLORIDE 500 MILLIGRAM(S): 850 TABLET ORAL at 22:34

## 2024-05-13 RX ADMIN — Medication 650 MILLIGRAM(S): at 06:05

## 2024-05-13 RX ADMIN — Medication 1 TABLET(S): at 22:34

## 2024-05-13 RX ADMIN — CARBIDOPA AND LEVODOPA 1.5 TABLET(S): 25; 100 TABLET ORAL at 22:34

## 2024-05-13 RX ADMIN — Medication 1 MILLIGRAM(S): at 22:34

## 2024-05-13 RX ADMIN — ARIPIPRAZOLE 5 MILLIGRAM(S): 15 TABLET ORAL at 22:47

## 2024-05-13 RX ADMIN — Medication 75 MICROGRAM(S): at 06:32

## 2024-05-13 RX ADMIN — ARIPIPRAZOLE 5 MILLIGRAM(S): 15 TABLET ORAL at 12:05

## 2024-05-13 RX ADMIN — DIVALPROEX SODIUM 750 MILLIGRAM(S): 500 TABLET, DELAYED RELEASE ORAL at 22:33

## 2024-05-13 RX ADMIN — Medication 2: at 12:42

## 2024-05-13 NOTE — BH INPATIENT PSYCHIATRY PROGRESS NOTE - NSBHMETABOLIC_PSY_ALL_CORE_FT
BMI: BMI (kg/m2): 30.2 (01-15-24 @ 12:52)  HbA1c: A1C with Estimated Average Glucose Result: 7.6 % (01-12-24 @ 12:40)    Glucose: POCT Blood Glucose.: 217 mg/dL (05-13-24 @ 12:10)    BP: 128/88 (05-13-24 @ 11:23) (128/86 - 135/105)Vital Signs Last 24 Hrs  T(C): --  T(F): --  HR: 78 (05-13-24 @ 11:23) (78 - 78)  BP: 128/88 (05-13-24 @ 11:23) (128/86 - 128/88)  BP(mean): --  RR: 16 (05-12-24 @ 14:30) (16 - 16)  SpO2: --      Lipid Panel:
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
## 2024-05-13 NOTE — BH INPATIENT PSYCHIATRY PROGRESS NOTE - MSE UNSTRUCTURED FT
Appearance: casual grooming, poor hygiene;    Behavior: superficially cooperative  Activity has tremor anteroflexion suspect some movements are exaggerated when being observed  Speech: wnl  Mood: ok"  Affect: . irritable labile  Thought process: disorganized, tangential, loose  Thought content: paranoid accusatory, says water in her room is poisoned, has convvoluted plan about which medications or combinations she would be willing to take  Perceptual disturbances: no appearance of internal preoccupation  Cognition: adequately oriented  Insight: limited  Judgement: limited

## 2024-05-13 NOTE — BH INPATIENT PSYCHIATRY PROGRESS NOTE - CURRENT MEDICATION
MEDICATIONS  (STANDING):  ammonium lactate 12% Lotion 1 Application(s) Topical two times a day  ARIPiprazole  Solution 5 milliGRAM(s) Oral <User Schedule>  benztropine 1 milliGRAM(s) Oral two times a day  carbidopa/levodopa  25/100 1.5 Tablet(s) Oral <User Schedule>  cholecalciferol 400 Unit(s) Oral daily  divalproex Sprinkle 750 milliGRAM(s) Oral two times a day  glucagon  Injectable 1 milliGRAM(s) IntraMuscular once  hemorrhoidal Ointment 1 Application(s) Rectal daily  hydrocortisone 2.5% Ointment 1 Application(s) Topical two times a day  insulin lispro (ADMELOG) corrective regimen sliding scale   SubCutaneous three times a day before meals  levothyroxine 75 MICROGram(s) Oral daily  lisinopril 20 milliGRAM(s) Oral daily  metFORMIN 500 milliGRAM(s) Oral two times a day  metoprolol succinate ER 50 milliGRAM(s) Oral daily  multivitamin 1 Tablet(s) Oral at bedtime  polyethylene glycol 3350 17 Gram(s) Oral daily  senna 2 Tablet(s) Oral at bedtime    MEDICATIONS  (PRN):  acetaminophen     Tablet .. 650 milliGRAM(s) Oral every 6 hours PRN Mild Pain (1 - 3), Moderate Pain (4 - 6)  albuterol    90 MICROgram(s) HFA Inhaler 2 Puff(s) Inhalation every 6 hours PRN Shortness of Breath and/or Wheezing  aluminum hydroxide/magnesium hydroxide/simethicone Suspension 30 milliLiter(s) Oral every 6 hours PRN Dyspepsia  bisacodyl Suppository 10 milliGRAM(s) Rectal daily PRN constipation  dextrose Oral Gel 15 Gram(s) Oral once PRN Blood Glucose LESS THAN 70 milliGRAM(s)/deciliter  diphenhydrAMINE Injectable 25 milliGRAM(s) IntraMuscular once PRN agitation  fluPHENAZine 2.5 milliGRAM(s) Oral every 6 hours PRN agitation  fluPHENAZine  Injectable 5 milliGRAM(s) IntraMuscular two times a day PRN refusal of Abilify as per court order  fluPHENAZine  Injectable 5 milliGRAM(s) IntraMuscular once PRN agitation  simethicone 80 milliGRAM(s) Chew every 8 hours PRN gas  sodium chloride 0.65% Nasal 2 Spray(s) Both Nostrils every 6 hours PRN congestion

## 2024-05-13 NOTE — BH INPATIENT PSYCHIATRY PROGRESS NOTE - NSBHCHARTREVIEWVS_PSY_A_CORE FT
Vital Signs Last 24 Hrs  T(C): --  T(F): --  HR: 78 (05-13-24 @ 11:23) (78 - 78)  BP: 128/88 (05-13-24 @ 11:23) (128/86 - 128/88)  BP(mean): --  RR: 16 (05-12-24 @ 14:30) (16 - 16)  SpO2: --

## 2024-05-13 NOTE — BH INPATIENT PSYCHIATRY PROGRESS NOTE - NSBHFUPINTERVALHXFT_PSY_A_CORE
Patient seen for SAD. No overnight events. Eating sleeping okay. Not as loud. Patient agreed to try risperidone and took last night but refusing today so farBP okay. Eating sleeping okay

## 2024-05-13 NOTE — BH INPATIENT PSYCHIATRY PROGRESS NOTE - PRN MEDS
MEDICATIONS  (PRN):  acetaminophen     Tablet .. 650 milliGRAM(s) Oral every 6 hours PRN Mild Pain (1 - 3), Moderate Pain (4 - 6)  albuterol    90 MICROgram(s) HFA Inhaler 2 Puff(s) Inhalation every 6 hours PRN Shortness of Breath and/or Wheezing  aluminum hydroxide/magnesium hydroxide/simethicone Suspension 30 milliLiter(s) Oral every 6 hours PRN Dyspepsia  bisacodyl Suppository 10 milliGRAM(s) Rectal daily PRN constipation  dextrose Oral Gel 15 Gram(s) Oral once PRN Blood Glucose LESS THAN 70 milliGRAM(s)/deciliter  diphenhydrAMINE Injectable 25 milliGRAM(s) IntraMuscular once PRN agitation  fluPHENAZine 2.5 milliGRAM(s) Oral every 6 hours PRN agitation  fluPHENAZine  Injectable 5 milliGRAM(s) IntraMuscular two times a day PRN refusal of Abilify as per court order  fluPHENAZine  Injectable 5 milliGRAM(s) IntraMuscular once PRN agitation  simethicone 80 milliGRAM(s) Chew every 8 hours PRN gas  sodium chloride 0.65% Nasal 2 Spray(s) Both Nostrils every 6 hours PRN congestion

## 2024-05-14 PROCEDURE — 99232 SBSQ HOSP IP/OBS MODERATE 35: CPT

## 2024-05-14 RX ADMIN — Medication 1 MILLIGRAM(S): at 11:09

## 2024-05-14 RX ADMIN — Medication 400 UNIT(S): at 11:09

## 2024-05-14 RX ADMIN — Medication 75 MICROGRAM(S): at 05:47

## 2024-05-14 RX ADMIN — METFORMIN HYDROCHLORIDE 500 MILLIGRAM(S): 850 TABLET ORAL at 11:09

## 2024-05-14 RX ADMIN — CARBIDOPA AND LEVODOPA 1.5 TABLET(S): 25; 100 TABLET ORAL at 22:03

## 2024-05-14 RX ADMIN — DIVALPROEX SODIUM 750 MILLIGRAM(S): 500 TABLET, DELAYED RELEASE ORAL at 22:00

## 2024-05-14 RX ADMIN — ARIPIPRAZOLE 5 MILLIGRAM(S): 15 TABLET ORAL at 22:04

## 2024-05-14 RX ADMIN — METFORMIN HYDROCHLORIDE 500 MILLIGRAM(S): 850 TABLET ORAL at 22:00

## 2024-05-14 RX ADMIN — SENNA PLUS 2 TABLET(S): 8.6 TABLET ORAL at 22:00

## 2024-05-14 RX ADMIN — Medication 1 MILLIGRAM(S): at 22:01

## 2024-05-14 RX ADMIN — ARIPIPRAZOLE 5 MILLIGRAM(S): 15 TABLET ORAL at 11:07

## 2024-05-14 RX ADMIN — CARBIDOPA AND LEVODOPA 1.5 TABLET(S): 25; 100 TABLET ORAL at 11:07

## 2024-05-14 RX ADMIN — DIVALPROEX SODIUM 750 MILLIGRAM(S): 500 TABLET, DELAYED RELEASE ORAL at 11:01

## 2024-05-14 RX ADMIN — Medication 1 TABLET(S): at 22:00

## 2024-05-14 NOTE — BH INPATIENT PSYCHIATRY PROGRESS NOTE - NSBHMETABOLIC_PSY_ALL_CORE_FT
BMI: BMI (kg/m2): 30.2 (01-15-24 @ 12:52)  HbA1c: A1C with Estimated Average Glucose Result: 7.6 % (01-12-24 @ 12:40)    Glucose: POCT Blood Glucose.: 248 mg/dL (05-13-24 @ 20:30)    BP: 128/88 (05-13-24 @ 11:23) (128/86 - 135/105)Vital Signs Last 24 Hrs  T(C): --  T(F): --  HR: --  BP: --  BP(mean): --  RR: --  SpO2: --      Lipid Panel:

## 2024-05-14 NOTE — BH INPATIENT PSYCHIATRY PROGRESS NOTE - PRN MEDS
MEDICATIONS  (PRN):  acetaminophen     Tablet .. 650 milliGRAM(s) Oral every 6 hours PRN Mild Pain (1 - 3), Moderate Pain (4 - 6)  albuterol    90 MICROgram(s) HFA Inhaler 2 Puff(s) Inhalation every 6 hours PRN Shortness of Breath and/or Wheezing  aluminum hydroxide/magnesium hydroxide/simethicone Suspension 30 milliLiter(s) Oral every 6 hours PRN Dyspepsia  bisacodyl Suppository 10 milliGRAM(s) Rectal daily PRN constipation  dextrose Oral Gel 15 Gram(s) Oral once PRN Blood Glucose LESS THAN 70 milliGRAM(s)/deciliter  diphenhydrAMINE Injectable 25 milliGRAM(s) IntraMuscular once PRN agitation  fluPHENAZine 2.5 milliGRAM(s) Oral every 6 hours PRN agitation  fluPHENAZine  Injectable 5 milliGRAM(s) IntraMuscular once PRN agitation  fluPHENAZine  Injectable 5 milliGRAM(s) IntraMuscular two times a day PRN refusal of Abilify as per court order  simethicone 80 milliGRAM(s) Chew every 8 hours PRN gas  sodium chloride 0.65% Nasal 2 Spray(s) Both Nostrils every 6 hours PRN congestion
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
## 2024-05-14 NOTE — BH INPATIENT PSYCHIATRY PROGRESS NOTE - NSBHFUPINTERVALHXFT_PSY_A_CORE
Patient seen for SAD.Taking meds with much encouragement. Refused vitals. Eats well, sleep fair. No agitation requiring prn

## 2024-05-14 NOTE — BH INPATIENT PSYCHIATRY PROGRESS NOTE - CURRENT MEDICATION
MEDICATIONS  (STANDING):  ammonium lactate 12% Lotion 1 Application(s) Topical two times a day  ARIPiprazole  Solution 5 milliGRAM(s) Oral <User Schedule>  benztropine 1 milliGRAM(s) Oral two times a day  carbidopa/levodopa  25/100 1.5 Tablet(s) Oral <User Schedule>  cholecalciferol 400 Unit(s) Oral daily  divalproex Sprinkle 750 milliGRAM(s) Oral two times a day  glucagon  Injectable 1 milliGRAM(s) IntraMuscular once  hemorrhoidal Ointment 1 Application(s) Rectal daily  hydrocortisone 2.5% Ointment 1 Application(s) Topical two times a day  insulin lispro (ADMELOG) corrective regimen sliding scale   SubCutaneous three times a day before meals  levothyroxine 75 MICROGram(s) Oral daily  lisinopril 20 milliGRAM(s) Oral daily  metFORMIN 500 milliGRAM(s) Oral two times a day  metoprolol succinate ER 50 milliGRAM(s) Oral daily  multivitamin 1 Tablet(s) Oral at bedtime  polyethylene glycol 3350 17 Gram(s) Oral daily  senna 2 Tablet(s) Oral at bedtime    MEDICATIONS  (PRN):  acetaminophen     Tablet .. 650 milliGRAM(s) Oral every 6 hours PRN Mild Pain (1 - 3), Moderate Pain (4 - 6)  albuterol    90 MICROgram(s) HFA Inhaler 2 Puff(s) Inhalation every 6 hours PRN Shortness of Breath and/or Wheezing  aluminum hydroxide/magnesium hydroxide/simethicone Suspension 30 milliLiter(s) Oral every 6 hours PRN Dyspepsia  bisacodyl Suppository 10 milliGRAM(s) Rectal daily PRN constipation  dextrose Oral Gel 15 Gram(s) Oral once PRN Blood Glucose LESS THAN 70 milliGRAM(s)/deciliter  diphenhydrAMINE Injectable 25 milliGRAM(s) IntraMuscular once PRN agitation  fluPHENAZine 2.5 milliGRAM(s) Oral every 6 hours PRN agitation  fluPHENAZine  Injectable 5 milliGRAM(s) IntraMuscular once PRN agitation  fluPHENAZine  Injectable 5 milliGRAM(s) IntraMuscular two times a day PRN refusal of Abilify as per court order  simethicone 80 milliGRAM(s) Chew every 8 hours PRN gas  sodium chloride 0.65% Nasal 2 Spray(s) Both Nostrils every 6 hours PRN congestion

## 2024-05-15 PROCEDURE — 99232 SBSQ HOSP IP/OBS MODERATE 35: CPT

## 2024-05-15 RX ADMIN — DIVALPROEX SODIUM 750 MILLIGRAM(S): 500 TABLET, DELAYED RELEASE ORAL at 11:46

## 2024-05-15 RX ADMIN — SENNA PLUS 2 TABLET(S): 8.6 TABLET ORAL at 22:21

## 2024-05-15 RX ADMIN — Medication 1 MILLIGRAM(S): at 13:13

## 2024-05-15 RX ADMIN — Medication 1 MILLIGRAM(S): at 22:18

## 2024-05-15 RX ADMIN — DIVALPROEX SODIUM 750 MILLIGRAM(S): 500 TABLET, DELAYED RELEASE ORAL at 22:21

## 2024-05-15 RX ADMIN — FLUPHENAZINE HYDROCHLORIDE 5 MILLIGRAM(S): 1 TABLET, FILM COATED ORAL at 11:42

## 2024-05-15 RX ADMIN — Medication 1 TABLET(S): at 22:22

## 2024-05-15 RX ADMIN — CARBIDOPA AND LEVODOPA 1.5 TABLET(S): 25; 100 TABLET ORAL at 22:21

## 2024-05-15 RX ADMIN — Medication 400 UNIT(S): at 13:13

## 2024-05-15 RX ADMIN — METFORMIN HYDROCHLORIDE 500 MILLIGRAM(S): 850 TABLET ORAL at 13:13

## 2024-05-15 RX ADMIN — ARIPIPRAZOLE 5 MILLIGRAM(S): 15 TABLET ORAL at 22:20

## 2024-05-15 RX ADMIN — CARBIDOPA AND LEVODOPA 1.5 TABLET(S): 25; 100 TABLET ORAL at 13:13

## 2024-05-15 RX ADMIN — METFORMIN HYDROCHLORIDE 500 MILLIGRAM(S): 850 TABLET ORAL at 22:22

## 2024-05-15 RX ADMIN — ARIPIPRAZOLE 5 MILLIGRAM(S): 15 TABLET ORAL at 13:14

## 2024-05-15 NOTE — CHART NOTE - NSCHARTNOTEFT_GEN_A_CORE
Pt seen for follow -up. Remains at [facility] for treatment of Schizophrenia. PMHx: Vascular Dementia, HTN, Hyperlipidemia and Type 2 diabetes.    Source: Patient  [x]    Family [ ]     other [X] RN    Diet: Pureed, Consistent carbohydrate with evening snack, DASH/TLC, Mildly thick liquids, no caffeine. Supplement: Glucerna Shake x 2 daily    GI: WDL. Last BM few days ago.     PO intake:  < 50% [ ] 50-75% [x]   % [ ]  other :    Saw Pt in day room. Reports good appetite/po intake. Complains of some constipation? Pt on bowel regimen. Per RN, po intake is on and off. Pt is not a candidate for diet education at this time. Food preferences explored and implemented (yogurt/pudding).    weight: 168 #, 4/14 166# (stable)    Edema: None.  Pressure Injuries: None.     __________________ Pertinent Medications__________________   MEDICATIONS  (STANDING):  ammonium lactate 12% Lotion 1 Application(s) Topical two times a day  ARIPiprazole  Solution 5 milliGRAM(s) Oral <User Schedule>  benztropine 1 milliGRAM(s) Oral two times a day  carbidopa/levodopa  25/100 1.5 Tablet(s) Oral <User Schedule>  cholecalciferol 400 Unit(s) Oral daily  divalproex Sprinkle 750 milliGRAM(s) Oral two times a day  glucagon  Injectable 1 milliGRAM(s) IntraMuscular once  hemorrhoidal Ointment 1 Application(s) Rectal daily  hydrocortisone 2.5% Ointment 1 Application(s) Topical two times a day  insulin lispro (ADMELOG) corrective regimen sliding scale   SubCutaneous three times a day before meals  levothyroxine 75 MICROGram(s) Oral daily  lisinopril 20 milliGRAM(s) Oral daily  metFORMIN 500 milliGRAM(s) Oral two times a day  metoprolol succinate ER 50 milliGRAM(s) Oral daily  multivitamin 1 Tablet(s) Oral at bedtime  polyethylene glycol 3350 17 Gram(s) Oral daily  senna 2 Tablet(s) Oral at bedtime      __________________ Pertinent Labs__________________     POCT Blood Glucose.: 248 mg/dL (05-13-24 @ 20:30)  POCT Blood Glucose.: 217 mg/dL (05-13-24 @ 12:10)  POCT Blood Glucose.: 191 mg/dL (05-12-24 @ 16:38)          Estimated Needs:   eliane/d  gm pro/d    [x] no change since previous assessment  [ ] recalculated:       Previous Nutrition Diagnosis: Inadequate protein-energy intake.    Nutrition Diagnosis is [x] ongoing  [ ] resolved [ ] not applicable     New Nutrition Diagnosis:      Recommendations:    Continue current diet.  Honor food preferences.  Monitor/Encourage po intake.     Monitoring and Evaluation:     [ x] Tolerance to diet prescription [x ] weights [x ] follow up per protocol  [x] other:  Lisa Mccray RDN Pager [phone]

## 2024-05-15 NOTE — CHART NOTE - NSCHARTNOTESELECT_GEN_ALL_CORE
Event Note
Follow-up/Nutrition Services
Nutrition re-consult/Nutrition Services
Event Note
Follow - up/Nutrition Services

## 2024-05-15 NOTE — BH INPATIENT PSYCHIATRY PROGRESS NOTE - NSBHMETABOLIC_PSY_ALL_CORE_FT
BMI: BMI (kg/m2): 30.2 (01-15-24 @ 12:52)  HbA1c: A1C with Estimated Average Glucose Result: 7.6 % (01-12-24 @ 12:40)    Glucose: POCT Blood Glucose.: 248 mg/dL (05-13-24 @ 20:30)    BP: 128/88 (05-13-24 @ 11:23) (128/86 - 128/88)Vital Signs Last 24 Hrs  T(C): --  T(F): --  HR: --  BP: --  BP(mean): --  RR: --  SpO2: --      Lipid Panel:
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
## 2024-05-16 PROCEDURE — 99232 SBSQ HOSP IP/OBS MODERATE 35: CPT

## 2024-05-16 RX ORDER — ARIPIPRAZOLE 15 MG/1
5 TABLET ORAL DAILY
Refills: 0 | Status: DISCONTINUED | OUTPATIENT
Start: 2024-05-16 | End: 2024-05-17

## 2024-05-16 RX ORDER — ARIPIPRAZOLE 15 MG/1
7.5 TABLET ORAL AT BEDTIME
Refills: 0 | Status: DISCONTINUED | OUTPATIENT
Start: 2024-05-16 | End: 2024-05-17

## 2024-05-16 RX ADMIN — DIVALPROEX SODIUM 750 MILLIGRAM(S): 500 TABLET, DELAYED RELEASE ORAL at 10:06

## 2024-05-16 RX ADMIN — ARIPIPRAZOLE 7.5 MILLIGRAM(S): 15 TABLET ORAL at 20:51

## 2024-05-16 RX ADMIN — Medication 1 APPLICATION(S): at 21:21

## 2024-05-16 RX ADMIN — LISINOPRIL 20 MILLIGRAM(S): 2.5 TABLET ORAL at 10:07

## 2024-05-16 RX ADMIN — CARBIDOPA AND LEVODOPA 1.5 TABLET(S): 25; 100 TABLET ORAL at 10:07

## 2024-05-16 RX ADMIN — Medication 1 TABLET(S): at 20:52

## 2024-05-16 RX ADMIN — ARIPIPRAZOLE 5 MILLIGRAM(S): 15 TABLET ORAL at 10:08

## 2024-05-16 RX ADMIN — Medication 1 MILLIGRAM(S): at 20:51

## 2024-05-16 RX ADMIN — CARBIDOPA AND LEVODOPA 1.5 TABLET(S): 25; 100 TABLET ORAL at 20:52

## 2024-05-16 RX ADMIN — SENNA PLUS 2 TABLET(S): 8.6 TABLET ORAL at 20:51

## 2024-05-16 RX ADMIN — METFORMIN HYDROCHLORIDE 500 MILLIGRAM(S): 850 TABLET ORAL at 20:51

## 2024-05-16 RX ADMIN — Medication 400 UNIT(S): at 10:07

## 2024-05-16 RX ADMIN — DIVALPROEX SODIUM 750 MILLIGRAM(S): 500 TABLET, DELAYED RELEASE ORAL at 20:51

## 2024-05-16 RX ADMIN — Medication 50 MILLIGRAM(S): at 10:08

## 2024-05-16 RX ADMIN — POLYETHYLENE GLYCOL 3350 17 GRAM(S): 17 POWDER, FOR SOLUTION ORAL at 10:06

## 2024-05-16 RX ADMIN — Medication 1 MILLIGRAM(S): at 10:07

## 2024-05-16 RX ADMIN — METFORMIN HYDROCHLORIDE 500 MILLIGRAM(S): 850 TABLET ORAL at 10:07

## 2024-05-16 NOTE — BH INPATIENT PSYCHIATRY PROGRESS NOTE - NSBHCHARTREVIEWVS_PSY_A_CORE FT
Vital Signs Last 24 Hrs  T(C): --  T(F): --  HR: --  BP: --  BP(mean): --  RR: --  SpO2: --    Orthostatic VS  05-16-24 @ 10:12  Lying BP: --/-- HR: --  Sitting BP: 164/71 HR: 88  Standing BP: 156/74 HR: 92  Site: upper left arm  Mode: electronic
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
## 2024-05-16 NOTE — BH INPATIENT PSYCHIATRY PROGRESS NOTE - CURRENT MEDICATION
MEDICATIONS  (STANDING):  ammonium lactate 12% Lotion 1 Application(s) Topical two times a day  ARIPiprazole  Solution 5 milliGRAM(s) Oral <User Schedule>  benztropine 1 milliGRAM(s) Oral two times a day  carbidopa/levodopa  25/100 1.5 Tablet(s) Oral <User Schedule>  cholecalciferol 400 Unit(s) Oral daily  divalproex Sprinkle 750 milliGRAM(s) Oral two times a day  glucagon  Injectable 1 milliGRAM(s) IntraMuscular once  hemorrhoidal Ointment 1 Application(s) Rectal daily  hydrocortisone 2.5% Ointment 1 Application(s) Topical two times a day  levothyroxine 75 MICROGram(s) Oral daily  lisinopril 20 milliGRAM(s) Oral daily  metFORMIN 500 milliGRAM(s) Oral two times a day  metoprolol succinate ER 50 milliGRAM(s) Oral daily  multivitamin 1 Tablet(s) Oral at bedtime  polyethylene glycol 3350 17 Gram(s) Oral daily  senna 2 Tablet(s) Oral at bedtime    MEDICATIONS  (PRN):  acetaminophen     Tablet .. 650 milliGRAM(s) Oral every 6 hours PRN Mild Pain (1 - 3), Moderate Pain (4 - 6)  albuterol    90 MICROgram(s) HFA Inhaler 2 Puff(s) Inhalation every 6 hours PRN Shortness of Breath and/or Wheezing  aluminum hydroxide/magnesium hydroxide/simethicone Suspension 30 milliLiter(s) Oral every 6 hours PRN Dyspepsia  bisacodyl Suppository 10 milliGRAM(s) Rectal daily PRN constipation  dextrose Oral Gel 15 Gram(s) Oral once PRN Blood Glucose LESS THAN 70 milliGRAM(s)/deciliter  diphenhydrAMINE Injectable 25 milliGRAM(s) IntraMuscular once PRN agitation  fluPHENAZine 2.5 milliGRAM(s) Oral every 6 hours PRN agitation  fluPHENAZine  Injectable 5 milliGRAM(s) IntraMuscular two times a day PRN refusal of Abilify as per court order  fluPHENAZine  Injectable 5 milliGRAM(s) IntraMuscular once PRN agitation  simethicone 80 milliGRAM(s) Chew every 8 hours PRN gas  sodium chloride 0.65% Nasal 2 Spray(s) Both Nostrils every 6 hours PRN congestion

## 2024-05-16 NOTE — BH INPATIENT PSYCHIATRY PROGRESS NOTE - NSBHMETABOLIC_PSY_ALL_CORE_FT
BMI: BMI (kg/m2): 30.2 (01-15-24 @ 12:52)  HbA1c: A1C with Estimated Average Glucose Result: 7.6 % (01-12-24 @ 12:40)    Glucose: POCT Blood Glucose.: 248 mg/dL (05-13-24 @ 20:30)    BP: --Vital Signs Last 24 Hrs  T(C): --  T(F): --  HR: --  BP: --  BP(mean): --  RR: --  SpO2: --    Orthostatic VS  05-16-24 @ 10:12  Lying BP: --/-- HR: --  Sitting BP: 164/71 HR: 88  Standing BP: 156/74 HR: 92  Site: upper left arm  Mode: electronic    Lipid Panel:
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
## 2024-05-16 NOTE — BH INPATIENT PSYCHIATRY PROGRESS NOTE - MSE UNSTRUCTURED FT
Appearance: casual grooming, poor hygiene;    Behavior: superficially cooperative  Activity has tremor anteroflexion  of neck  though suspect some movements are exaggerated when being observed  Speech: wnl  Mood: ok"  Affect: . irritable labile  Thought process: disorganized, tangential, loose  Thought content: paranoid accusatory made some illogical connect between Abilify and vitamins  Perceptual disturbances: no appearance of internal preoccupation  Cognition: adequately oriented  Insight: limited  Judgement: limited

## 2024-05-17 ENCOUNTER — INPATIENT (INPATIENT)
Facility: HOSPITAL | Age: 74
LOS: 3 days | Discharge: PSYCHIATRIC OR SUBSTANCE USE FACILITY | End: 2024-05-21
Attending: STUDENT IN AN ORGANIZED HEALTH CARE EDUCATION/TRAINING PROGRAM | Admitting: STUDENT IN AN ORGANIZED HEALTH CARE EDUCATION/TRAINING PROGRAM
Payer: MEDICARE

## 2024-05-17 VITALS
SYSTOLIC BLOOD PRESSURE: 141 MMHG | DIASTOLIC BLOOD PRESSURE: 77 MMHG | TEMPERATURE: 98 F | RESPIRATION RATE: 17 BRPM | OXYGEN SATURATION: 100 %

## 2024-05-17 VITALS
HEART RATE: 78 BPM | RESPIRATION RATE: 16 BRPM | SYSTOLIC BLOOD PRESSURE: 150 MMHG | DIASTOLIC BLOOD PRESSURE: 97 MMHG | TEMPERATURE: 98 F | OXYGEN SATURATION: 100 %

## 2024-05-17 DIAGNOSIS — T78.3XXA ANGIONEUROTIC EDEMA, INITIAL ENCOUNTER: ICD-10-CM

## 2024-05-17 LAB
ALBUMIN SERPL ELPH-MCNC: 3.8 G/DL — SIGNIFICANT CHANGE UP (ref 3.3–5)
ALP SERPL-CCNC: 96 U/L — SIGNIFICANT CHANGE UP (ref 40–120)
ALT FLD-CCNC: 18 U/L — SIGNIFICANT CHANGE UP (ref 4–33)
ANION GAP SERPL CALC-SCNC: 14 MMOL/L — SIGNIFICANT CHANGE UP (ref 7–14)
AST SERPL-CCNC: 47 U/L — HIGH (ref 4–32)
BASE EXCESS BLDV CALC-SCNC: 3.9 MMOL/L — HIGH (ref -2–3)
BASOPHILS # BLD AUTO: 0.04 K/UL — SIGNIFICANT CHANGE UP (ref 0–0.2)
BASOPHILS NFR BLD AUTO: 0.6 % — SIGNIFICANT CHANGE UP (ref 0–2)
BILIRUB SERPL-MCNC: <0.2 MG/DL — SIGNIFICANT CHANGE UP (ref 0.2–1.2)
BLOOD GAS VENOUS COMPREHENSIVE RESULT: SIGNIFICANT CHANGE UP
BUN SERPL-MCNC: 20 MG/DL — SIGNIFICANT CHANGE UP (ref 7–23)
CALCIUM SERPL-MCNC: 9.7 MG/DL — SIGNIFICANT CHANGE UP (ref 8.4–10.5)
CHLORIDE BLDV-SCNC: 105 MMOL/L — SIGNIFICANT CHANGE UP (ref 96–108)
CHLORIDE SERPL-SCNC: 103 MMOL/L — SIGNIFICANT CHANGE UP (ref 98–107)
CO2 BLDV-SCNC: 31.2 MMOL/L — HIGH (ref 22–26)
CO2 SERPL-SCNC: 22 MMOL/L — SIGNIFICANT CHANGE UP (ref 22–31)
CREAT SERPL-MCNC: 0.71 MG/DL — SIGNIFICANT CHANGE UP (ref 0.5–1.3)
EGFR: 91 ML/MIN/1.73M2 — SIGNIFICANT CHANGE UP
EOSINOPHIL # BLD AUTO: 0.18 K/UL — SIGNIFICANT CHANGE UP (ref 0–0.5)
EOSINOPHIL NFR BLD AUTO: 2.8 % — SIGNIFICANT CHANGE UP (ref 0–6)
GAS PNL BLDV: 136 MMOL/L — SIGNIFICANT CHANGE UP (ref 136–145)
GLUCOSE BLDC GLUCOMTR-MCNC: 134 MG/DL — HIGH (ref 70–99)
GLUCOSE BLDV-MCNC: 111 MG/DL — HIGH (ref 70–99)
GLUCOSE SERPL-MCNC: 114 MG/DL — HIGH (ref 70–99)
HCO3 BLDV-SCNC: 30 MMOL/L — HIGH (ref 22–29)
HCT VFR BLD CALC: 35.3 % — SIGNIFICANT CHANGE UP (ref 34.5–45)
HCT VFR BLDA CALC: 34 % — LOW (ref 34.5–46.5)
HGB BLD CALC-MCNC: 11.4 G/DL — LOW (ref 11.7–16.1)
HGB BLD-MCNC: 11 G/DL — LOW (ref 11.5–15.5)
HIV 1+2 AB+HIV1 P24 AG SERPL QL IA: SIGNIFICANT CHANGE UP
IANC: 3.64 K/UL — SIGNIFICANT CHANGE UP (ref 1.8–7.4)
IMM GRANULOCYTES NFR BLD AUTO: 0.3 % — SIGNIFICANT CHANGE UP (ref 0–0.9)
LACTATE BLDV-MCNC: 2 MMOL/L — SIGNIFICANT CHANGE UP (ref 0.5–2)
LYMPHOCYTES # BLD AUTO: 1.83 K/UL — SIGNIFICANT CHANGE UP (ref 1–3.3)
LYMPHOCYTES # BLD AUTO: 28.6 % — SIGNIFICANT CHANGE UP (ref 13–44)
MCHC RBC-ENTMCNC: 28.3 PG — SIGNIFICANT CHANGE UP (ref 27–34)
MCHC RBC-ENTMCNC: 31.2 GM/DL — LOW (ref 32–36)
MCV RBC AUTO: 90.7 FL — SIGNIFICANT CHANGE UP (ref 80–100)
MONOCYTES # BLD AUTO: 0.68 K/UL — SIGNIFICANT CHANGE UP (ref 0–0.9)
MONOCYTES NFR BLD AUTO: 10.6 % — SIGNIFICANT CHANGE UP (ref 2–14)
NEUTROPHILS # BLD AUTO: 3.64 K/UL — SIGNIFICANT CHANGE UP (ref 1.8–7.4)
NEUTROPHILS NFR BLD AUTO: 57.1 % — SIGNIFICANT CHANGE UP (ref 43–77)
NRBC # BLD: 0 /100 WBCS — SIGNIFICANT CHANGE UP (ref 0–0)
NRBC # FLD: 0 K/UL — SIGNIFICANT CHANGE UP (ref 0–0)
PCO2 BLDV: 49 MMHG — SIGNIFICANT CHANGE UP (ref 39–52)
PH BLDV: 7.39 — SIGNIFICANT CHANGE UP (ref 7.32–7.43)
PLATELET # BLD AUTO: 214 K/UL — SIGNIFICANT CHANGE UP (ref 150–400)
PO2 BLDV: 41 MMHG — SIGNIFICANT CHANGE UP (ref 25–45)
POTASSIUM BLDV-SCNC: 4.5 MMOL/L — SIGNIFICANT CHANGE UP (ref 3.5–5.1)
POTASSIUM SERPL-MCNC: 5.1 MMOL/L — SIGNIFICANT CHANGE UP (ref 3.5–5.3)
POTASSIUM SERPL-SCNC: 5.1 MMOL/L — SIGNIFICANT CHANGE UP (ref 3.5–5.3)
PROT SERPL-MCNC: 7.6 G/DL — SIGNIFICANT CHANGE UP (ref 6–8.3)
RBC # BLD: 3.89 M/UL — SIGNIFICANT CHANGE UP (ref 3.8–5.2)
RBC # FLD: 14.1 % — SIGNIFICANT CHANGE UP (ref 10.3–14.5)
SAO2 % BLDV: 68.2 % — SIGNIFICANT CHANGE UP (ref 67–88)
SODIUM SERPL-SCNC: 139 MMOL/L — SIGNIFICANT CHANGE UP (ref 135–145)
WBC # BLD: 6.39 K/UL — SIGNIFICANT CHANGE UP (ref 3.8–10.5)
WBC # FLD AUTO: 6.39 K/UL — SIGNIFICANT CHANGE UP (ref 3.8–10.5)

## 2024-05-17 PROCEDURE — 99285 EMERGENCY DEPT VISIT HI MDM: CPT

## 2024-05-17 PROCEDURE — 99232 SBSQ HOSP IP/OBS MODERATE 35: CPT

## 2024-05-17 RX ORDER — DIPHENHYDRAMINE HCL 50 MG
50 CAPSULE ORAL ONCE
Refills: 0 | Status: COMPLETED | OUTPATIENT
Start: 2024-05-17 | End: 2024-05-17

## 2024-05-17 RX ORDER — FAMOTIDINE 10 MG/ML
20 INJECTION INTRAVENOUS ONCE
Refills: 0 | Status: COMPLETED | OUTPATIENT
Start: 2024-05-17 | End: 2024-05-17

## 2024-05-17 RX ADMIN — Medication 75 MICROGRAM(S): at 06:38

## 2024-05-17 RX ADMIN — Medication 50 MILLIGRAM(S): at 19:59

## 2024-05-17 RX ADMIN — FLUPHENAZINE HYDROCHLORIDE 5 MILLIGRAM(S): 1 TABLET, FILM COATED ORAL at 11:45

## 2024-05-17 RX ADMIN — Medication 125 MILLIGRAM(S): at 19:59

## 2024-05-17 RX ADMIN — FAMOTIDINE 20 MILLIGRAM(S): 10 INJECTION INTRAVENOUS at 19:58

## 2024-05-17 NOTE — BH INPATIENT PSYCHIATRY PROGRESS NOTE - NSTXPROBCOPE_PSY_ALL_CORE
COPING, INEFFECTIVE

## 2024-05-17 NOTE — BH INPATIENT PSYCHIATRY PROGRESS NOTE - NSTXPSYCHODATEEST_PSY_ALL_CORE
13-Feb-2024
06-Mar-2024
24-Jan-2024
13-Feb-2024
13-Feb-2024
18-Apr-2024
16-Mar-2024
16-Mar-2024
18-Apr-2024
24-Jan-2024
17-Jan-2024
06-Mar-2024
13-Feb-2024
16-Mar-2024
24-Jan-2024
17-Jan-2024
24-Jan-2024
24-Jan-2024
13-Feb-2024
18-Apr-2024
13-Feb-2024
17-Jan-2024
24-Jan-2024
18-Apr-2024
16-Mar-2024
24-Jan-2024
18-Apr-2024
24-Jan-2024
24-Jan-2024
18-Apr-2024
24-Jan-2024
18-Apr-2024
24-Jan-2024
13-Feb-2024
16-Mar-2024
13-Apr-2024
13-Feb-2024
06-Mar-2024
18-Apr-2024
18-Apr-2024
24-Jan-2024
06-Mar-2024
08-Apr-2024
09-Apr-2024
13-Feb-2024
18-Apr-2024
16-Mar-2024
24-Jan-2024
13-Feb-2024
13-Feb-2024
18-Apr-2024
24-Jan-2024
16-Mar-2024
18-Apr-2024
13-Apr-2024
17-Jan-2024
13-Feb-2024
16-Mar-2024
17-Jan-2024
13-Feb-2024
18-Apr-2024
06-Mar-2024
13-Apr-2024
24-Jan-2024
13-Feb-2024
16-Mar-2024
16-Mar-2024
24-Jan-2024
16-Mar-2024
24-Jan-2024
06-Mar-2024
13-Feb-2024
06-Mar-2024
11-Apr-2024
16-Mar-2024
24-Jan-2024
24-Jan-2024
13-Apr-2024
13-Feb-2024
13-Feb-2024
18-Apr-2024
18-Apr-2024
24-Jan-2024
13-Feb-2024
16-Mar-2024
18-Apr-2024
18-Apr-2024
06-Mar-2024
18-Apr-2024
06-Mar-2024
16-Mar-2024
18-Apr-2024
16-Mar-2024
17-Jan-2024
16-Mar-2024

## 2024-05-17 NOTE — BH INPATIENT PSYCHIATRY PROGRESS NOTE - NSTXCOPEPROGRES_PSY_ALL_CORE
No Change
No Change
Worsening
No Change
Improving
Improving
No Change
Worsening
Improving
No Change
Worsening
No Change
Worsening
Worsening
Improving
Worsening
Improving
Worsening
No Change
No Change
Improving
No Change
Improving
Worsening
No Change
Improving
No Change
Improving
No Change
Improving
No Change
Worsening
Improving
No Change
Improving
No Change
Worsening
No Change
Worsening
No Change
Worsening
Worsening

## 2024-05-17 NOTE — ED PROVIDER NOTE - CLINICAL SUMMARY MEDICAL DECISION MAKING FREE TEXT BOX
73-year-old female history of vascular dementia, Parkinson's, retinopathy with macular edema, asthma, seizures, hypothyroidism, HLD, GERD, MDD, MI, malignant neoplasm of the bladder, anxiety, hydrocephalus, TERRY, schizophrenia, HTN presenting to the ED for swelling of the lip.  ROS is limited secondary to mental status on physical exam.     VSS.  PE.  Angioedema of the bottom lip.  No stridor on exam. No obstruction in the airway, patient able to speak in full sentence,  though drowsy.  No concern for airway obstruction at this time.  The rest of the physical exam is nonactionable.  Differential includes but not limited to angioedema, allergic reaction, will give medications.  On review of patient meds, she is on 20mg lisinopril.

## 2024-05-17 NOTE — BH INPATIENT PSYCHIATRY PROGRESS NOTE - NSTXCONFPROGRES_PSY_ALL_CORE
No Change
No Change
Improving
Improving
No Change
No Change
Improving
No Change
Improving
No Change
No Change
Improving
No Change
Improving
No Change
Improving
No Change
Improving
No Change
Improving
No Change
Improving
No Change
No Change
Improving
No Change
No Change
Improving
Improving
No Change
Improving
No Change
Improving
No Change
Improving
No Change
No Change
Improving
No Change
Improving
No Change
Improving
Improving
No Change
Improving
Improving
No Change
No Change
Improving
No Change
No Change
Improving
Improving
No Change
Improving
No Change
Improving
Improving
No Change
No Change
Improving
No Change

## 2024-05-17 NOTE — ED ADULT NURSE NOTE - NSFALLRISKINTERV_ED_ALL_ED

## 2024-05-17 NOTE — BH INPATIENT PSYCHIATRY PROGRESS NOTE - NSTXVIOLNTDATEEST_PSY_ALL_CORE
13-Apr-2024
13-Feb-2024
18-Apr-2024
16-Mar-2024
24-Jan-2024
13-Feb-2024
16-Mar-2024
18-Apr-2024
06-Mar-2024
24-Jan-2024
13-Feb-2024
17-Jan-2024
24-Jan-2024
18-Apr-2024
18-Apr-2024
13-Feb-2024
16-Mar-2024
24-Jan-2024
24-Jan-2024
06-Mar-2024
24-Jan-2024
18-Apr-2024
24-Jan-2024
24-Jan-2024
17-Jan-2024
18-Apr-2024
13-Feb-2024
13-Feb-2024
24-Jan-2024
13-Apr-2024
06-Mar-2024
16-Mar-2024
18-Apr-2024
06-Mar-2024
13-Feb-2024
24-Jan-2024
16-Mar-2024
18-Apr-2024
16-Mar-2024
18-Apr-2024
24-Jan-2024
18-Apr-2024
18-Apr-2024
06-Mar-2024
24-Jan-2024
11-Apr-2024
24-Jan-2024
17-Jan-2024
13-Feb-2024
16-Mar-2024
16-Mar-2024
13-Apr-2024
13-Feb-2024
18-Apr-2024
13-Apr-2024
13-Feb-2024
13-Feb-2024
16-Mar-2024
13-Feb-2024
18-Apr-2024
24-Jan-2024
24-Jan-2024
16-Mar-2024
17-Jan-2024
18-Apr-2024
24-Jan-2024
18-Apr-2024
13-Feb-2024
17-Jan-2024
16-Mar-2024
17-Jan-2024
18-Apr-2024
13-Feb-2024
18-Apr-2024
24-Jan-2024
13-Feb-2024
18-Apr-2024
18-Apr-2024
24-Jan-2024
16-Mar-2024
16-Mar-2024
18-Apr-2024
16-Mar-2024
24-Jan-2024
06-Mar-2024
13-Feb-2024
13-Feb-2024
06-Mar-2024
16-Mar-2024
06-Mar-2024
16-Mar-2024
13-Feb-2024
16-Mar-2024
18-Apr-2024
24-Jan-2024
18-Apr-2024
18-Apr-2024
06-Mar-2024
16-Mar-2024

## 2024-05-17 NOTE — BH INPATIENT PSYCHIATRY PROGRESS NOTE - NSTXDIABDATEEST_PSY_ALL_CORE
13-Feb-2024
18-Apr-2024
18-Apr-2024
13-Feb-2024
13-Feb-2024
06-Mar-2024
13-Feb-2024
18-Apr-2024
24-Jan-2024
18-Apr-2024
24-Jan-2024
18-Apr-2024
23-Mar-2024
24-Jan-2024
13-Apr-2024
06-Mar-2024
06-Mar-2024
13-Feb-2024
06-Mar-2024
06-Mar-2024
18-Apr-2024
06-Mar-2024
13-Feb-2024
06-Mar-2024
23-Mar-2024
24-Jan-2024
06-Mar-2024
13-Apr-2024
13-Feb-2024
23-Mar-2024
23-Mar-2024
24-Jan-2024
18-Apr-2024
18-Apr-2024
24-Jan-2024
24-Jan-2024
13-Feb-2024
18-Apr-2024
13-Feb-2024
18-Apr-2024
18-Apr-2024
06-Mar-2024
18-Apr-2024
24-Jan-2024
18-Apr-2024
23-Mar-2024
13-Feb-2024
23-Mar-2024
24-Jan-2024
23-Mar-2024
13-Feb-2024
18-Apr-2024
18-Apr-2024
23-Mar-2024
23-Mar-2024
06-Mar-2024
13-Feb-2024
13-Feb-2024
24-Jan-2024
24-Jan-2024
06-Mar-2024
18-Apr-2024
18-Apr-2024
24-Jan-2024
24-Jan-2024
13-Apr-2024
13-Feb-2024
23-Mar-2024
24-Jan-2024
09-Apr-2024
24-Jan-2024
23-Mar-2024
06-Mar-2024
24-Jan-2024
18-Apr-2024
06-Mar-2024
13-Feb-2024
18-Apr-2024
24-Jan-2024
06-Mar-2024
13-Feb-2024
18-Apr-2024
13-Feb-2024
06-Mar-2024
13-Apr-2024
18-Apr-2024
18-Apr-2024
24-Jan-2024
23-Mar-2024
13-Feb-2024
11-Apr-2024
18-Apr-2024
18-Apr-2024
23-Mar-2024

## 2024-05-17 NOTE — BH INPATIENT PSYCHIATRY PROGRESS NOTE - NSTREATMENTCERT_PSY_ALL_CORE
.

## 2024-05-17 NOTE — BH INPATIENT PSYCHIATRY PROGRESS NOTE - NSTXPROBDIAB_PSY_ALL_CORE
DIABETES MANAGEMENT

## 2024-05-17 NOTE — BH INPATIENT PSYCHIATRY PROGRESS NOTE - NSTXDIABDATETRGT_PSY_ALL_CORE
25-Apr-2024
25-Jan-2024
30-Mar-2024
25-Apr-2024
20-Feb-2024
20-Feb-2024
25-Apr-2024
25-Apr-2024
25-Jan-2024
25-Jan-2024
25-Apr-2024
20-Feb-2024
25-Apr-2024
13-Mar-2024
13-Mar-2024
25-Apr-2024
25-Apr-2024
30-Mar-2024
30-Mar-2024
13-Mar-2024
20-Feb-2024
25-Apr-2024
25-Apr-2024
13-Mar-2024
13-Mar-2024
20-Apr-2024
20-Feb-2024
13-Mar-2024
13-Mar-2024
20-Feb-2024
25-Jan-2024
20-Apr-2024
20-Feb-2024
25-Apr-2024
25-Apr-2024
25-Jan-2024
13-Mar-2024
30-Mar-2024
30-Mar-2024
20-Feb-2024
25-Apr-2024
25-Jan-2024
25-Apr-2024
25-Jan-2024
20-Apr-2024
25-Apr-2024
20-Feb-2024
25-Apr-2024
13-Mar-2024
25-Jan-2024
30-Mar-2024
30-Mar-2024
25-Jan-2024
25-Apr-2024
25-Jan-2024
30-Mar-2024
25-Apr-2024
13-Mar-2024
25-Jan-2024
20-Feb-2024
30-Mar-2024
20-Feb-2024
25-Apr-2024
25-Apr-2024
13-Mar-2024
13-Mar-2024
20-Feb-2024
20-Feb-2024
25-Apr-2024
25-Jan-2024
25-Jan-2024
13-Mar-2024
13-Mar-2024
20-Feb-2024
25-Jan-2024
25-Apr-2024
25-Jan-2024
18-Apr-2024
30-Mar-2024
20-Apr-2024
13-Mar-2024
25-Apr-2024
25-Apr-2024
25-Jan-2024
20-Feb-2024
25-Jan-2024
25-Apr-2024
30-Mar-2024
25-Apr-2024
30-Mar-2024
16-Apr-2024
25-Apr-2024
30-Mar-2024
20-Feb-2024

## 2024-05-17 NOTE — BH INPATIENT PSYCHIATRY PROGRESS NOTE - NSTXPROBDCOTHR_PSY_ALL_CORE
DISCHARGE ISSUE - OTHER

## 2024-05-17 NOTE — BH INPATIENT PSYCHIATRY PROGRESS NOTE - NSTXDCOTHRPROGRES_PSY_ALL_CORE
No Change
Worsening
Improving
Improving
No Change
Worsening
No Change
Worsening
Improving
No Change
Improving
No Change
Improving
Improving
No Change
Worsening
Worsening
Improving
Worsening
Worsening
Improving
No Change
Worsening
Improving
No Change
No Change
Worsening
No Change
Improving
No Change
No Change
Worsening
Improving
Worsening
Worsening
No Change
Improving
No Change
No Change
Worsening
Worsening
Improving
No Change
Worsening
Worsening
No Change
Worsening
Worsening
Improving
No Change
No Change
Improving
Improving
No Change
Worsening
Improving
Worsening
Improving
Worsening
Improving
Improving
Worsening
Worsening
Improving
No Change
Worsening
Worsening
No Change
Worsening
Improving
No Change
Worsening
No Change
Improving
Worsening

## 2024-05-17 NOTE — BH INPATIENT PSYCHIATRY PROGRESS NOTE - NSTXIMPULSPROGRES_PSY_ALL_CORE
Improving
No Change
Improving
No Change
Improving
Improving
No Change
Worsening
Improving
No Change
Improving
No Change
No Change
Improving
Worsening
Improving
Improving
No Change
Improving
Improving
No Change
No Change
Improving
Improving
Worsening
Improving
No Change
No Change
Improving
No Change
No Change
Improving
Improving
No Change
No Change
Improving
Worsening
No Change
Improving
No Change
Improving
No Change
Improving
Improving
No Change
Improving
No Change
Improving
No Change
Improving
No Change
Improving
No Change
Worsening
No Change
Improving
Improving

## 2024-05-17 NOTE — BH INPATIENT PSYCHIATRY PROGRESS NOTE - NSICDXBHPRIMARYDX_PSY_ALL_CORE
Schizoaffective disorder   F25.9  
Schizophrenia   F20.9  
Schizoaffective disorder   F25.9  
Schizophrenia   F20.9  
Schizoaffective disorder   F25.9  
Schizoaffective disorder   F25.9  
Schizophrenia   F20.9  
Schizoaffective disorder   F25.9  
Schizophrenia   F20.9  
Schizoaffective disorder   F25.9  
Schizophrenia   F20.9  
Schizoaffective disorder   F25.9  
Schizoaffective disorder   F25.9  
Schizophrenia   F20.9  
Schizophrenia   F20.9  
Schizoaffective disorder   F25.9  
Schizoaffective disorder   F25.9  
Schizophrenia   F20.9  
Schizophrenia   F20.9  
Schizoaffective disorder   F25.9  
Schizophrenia   F20.9  
Schizoaffective disorder   F25.9  
Schizophrenia   F20.9  
Schizoaffective disorder   F25.9  
Schizophrenia   F20.9  
Schizoaffective disorder   F25.9  
Schizophrenia   F20.9  
Schizoaffective disorder   F25.9  
Schizophrenia   F20.9  
Schizoaffective disorder   F25.9  

## 2024-05-17 NOTE — BH INPATIENT PSYCHIATRY PROGRESS NOTE - NSTXFALLDATEEST_PSY_ALL_CORE
18-Apr-2024
24-Mar-2024
18-Apr-2024
18-Apr-2024
24-Mar-2024
24-Mar-2024
18-Apr-2024
13-Apr-2024
18-Apr-2024
18-Apr-2024
24-Mar-2024
24-Mar-2024
18-Apr-2024
18-Apr-2024
13-Apr-2024
18-Apr-2024
18-Apr-2024
24-Mar-2024
18-Apr-2024
11-Apr-2024
24-Mar-2024
18-Apr-2024
18-Apr-2024
13-Apr-2024
18-Apr-2024
24-Mar-2024
18-Apr-2024
24-Mar-2024
09-Apr-2024
24-Mar-2024
18-Apr-2024
13-Apr-2024
18-Apr-2024
24-Mar-2024

## 2024-05-17 NOTE — ED PROVIDER NOTE - PROGRESS NOTE DETAILS
Enmanuel, PGY3 - Received signout on patient.  73-year-old woman with PMH schizophrenia, vascular dementia, Parkinson's, presenting from [facility] due to swelling of her lower lip and pain since 3 PM.  Concerning for angioedema due to lisinopril use.  Labs nonactionable, lips have not improved despite medications.  Spoke to hospitalist Amish, accepting patient for observation.

## 2024-05-17 NOTE — ED ADULT NURSE REASSESSMENT NOTE - NS ED NURSE REASSESS COMMENT FT1
Pt received on stretcher, staff from [facility] at bedside with pt, pt is sleeping, lower lip is swollen, pt denies any pain at this time. Pt is calm and cooperative with staff.

## 2024-05-17 NOTE — ED ADULT NURSE NOTE - CHIEF COMPLAINT QUOTE
patient is coming from [address] with swelling of her lower lip" h/o HTN. patient is on lisinopril for HTN

## 2024-05-17 NOTE — BH INPATIENT PSYCHIATRY PROGRESS NOTE - NSTXVIOLNTGOAL_PSY_ALL_CORE
Will be able to express understanding of at least one trigger to their aggressive behavior
Will communicate an angry feeling in a controlled manner
Will decrease the number/duration/intensity of angry/aggressive outbursts
Will decrease the number/duration/intensity of angry/aggressive outbursts
Will communicate an angry feeling in a controlled manner
Will communicate an angry feeling in a controlled manner
Will identify 2 situations that cause an aggressive response
Will be able to express understanding of at least one trigger to their aggressive behavior
Will decrease the number/duration/intensity of angry/aggressive outbursts
Will decrease the number/duration/intensity of angry/aggressive outbursts
Will be able to express understanding of at least one trigger to their aggressive behavior
Will decrease the number/duration/intensity of angry/aggressive outbursts
Will identify 2 situations that cause an aggressive response
Will identify 2 situations that cause an aggressive response
Will be able to express understanding of at least one trigger to their aggressive behavior
Will demonstrate ability to tolerate peer conflicts without aggression 3x weekly
Will demonstrate 2 problem solving strategies to decrease aggressive behavior
Will identify 2 situations that cause an aggressive response
Will identify 2 situations that cause an aggressive response
Will be able to express understanding of at least one trigger to their aggressive behavior
Will communicate an angry feeling in a controlled manner
Will identify 2 situations that cause an aggressive response
Will be able to express understanding of at least one trigger to their aggressive behavior
Will decrease the number/duration/intensity of angry/aggressive outbursts
Will be able to express understanding of at least one trigger to their aggressive behavior
Will communicate an angry feeling in a controlled manner
Will be able to express understanding of at least one trigger to their aggressive behavior
Will decrease the number/duration/intensity of angry/aggressive outbursts
Will be able to express understanding of at least one trigger to their aggressive behavior
Will decrease the number/duration/intensity of angry/aggressive outbursts
Will demonstrate ability to tolerate peer conflicts without aggression 3x weekly
Will be able to express understanding of at least one trigger to their aggressive behavior
Will demonstrate ability to tolerate peer conflicts without aggression 3x weekly
Will decrease the number/duration/intensity of angry/aggressive outbursts
Will communicate an angry feeling in a controlled manner
Will communicate an angry feeling in a controlled manner
Will decrease the number/duration/intensity of angry/aggressive outbursts
Will identify 2 situations that cause an aggressive response
Will be able to express understanding of at least one trigger to their aggressive behavior
Will communicate an angry feeling in a controlled manner
Will communicate an angry feeling in a controlled manner
Will decrease the number/duration/intensity of angry/aggressive outbursts
Will decrease the number/duration/intensity of angry/aggressive outbursts
Will demonstrate 2 problem solving strategies to decrease aggressive behavior
Will identify 2 situations that cause an aggressive response
Will be able to express understanding of at least one trigger to their aggressive behavior
Will communicate an angry feeling in a controlled manner
Will identify 2 situations that cause an aggressive response
Will decrease the number/duration/intensity of angry/aggressive outbursts
Will decrease the number/duration/intensity of angry/aggressive outbursts
Will demonstrate 2 problem solving strategies to decrease aggressive behavior
Will identify 2 situations that cause an aggressive response
Will be able to express understanding of at least one trigger to their aggressive behavior
Will be able to express understanding of at least one trigger to their aggressive behavior
Will decrease the number/duration/intensity of angry/aggressive outbursts
Will identify 2 situations that cause an aggressive response
Will be able to express understanding of at least one trigger to their aggressive behavior
Will be able to express understanding of at least one trigger to their aggressive behavior
Will decrease the number/duration/intensity of angry/aggressive outbursts
Will decrease the number/duration/intensity of angry/aggressive outbursts
Will be able to express understanding of at least one trigger to their aggressive behavior
Will demonstrate ability to tolerate peer conflicts without aggression 3x weekly
Will decrease the number/duration/intensity of angry/aggressive outbursts
Will demonstrate ability to tolerate peer conflicts without aggression 3x weekly
Will identify 2 situations that cause an aggressive response
Will decrease the number/duration/intensity of angry/aggressive outbursts
Will be able to express understanding of at least one trigger to their aggressive behavior
Will communicate an angry feeling in a controlled manner
Will demonstrate 2 problem solving strategies to decrease aggressive behavior
Will identify 2 situations that cause an aggressive response
Will communicate an angry feeling in a controlled manner
Will be able to express understanding of at least one trigger to their aggressive behavior
Will identify 2 situations that cause an aggressive response
Will identify 2 situations that cause an aggressive response
Will decrease the number/duration/intensity of angry/aggressive outbursts
Will communicate an angry feeling in a controlled manner
Will demonstrate ability to tolerate peer conflicts without aggression 3x weekly
Will be able to express understanding of at least one trigger to their aggressive behavior
Will decrease the number/duration/intensity of angry/aggressive outbursts
Will identify 2 situations that cause an aggressive response
Will decrease the number/duration/intensity of angry/aggressive outbursts
Will be able to express understanding of at least one trigger to their aggressive behavior
Will identify 2 situations that cause an aggressive response
Will communicate an angry feeling in a controlled manner
Will be able to express understanding of at least one trigger to their aggressive behavior
Will communicate an angry feeling in a controlled manner
Will decrease the number/duration/intensity of angry/aggressive outbursts
Will be able to express understanding of at least one trigger to their aggressive behavior
Will communicate an angry feeling in a controlled manner
Will be able to express understanding of at least one trigger to their aggressive behavior
Will be able to express understanding of at least one trigger to their aggressive behavior
Will identify 2 situations that cause an aggressive response
Will decrease the number/duration/intensity of angry/aggressive outbursts
Will decrease the number/duration/intensity of angry/aggressive outbursts
Will identify 2 situations that cause an aggressive response
Will demonstrate 2 problem solving strategies to decrease aggressive behavior
Will identify 2 situations that cause an aggressive response
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
## 2024-05-17 NOTE — BH INPATIENT PSYCHIATRY PROGRESS NOTE - NSBHCHARTREVIEWVS_PSY_A_CORE FT
Vital Signs Last 24 Hrs  T(C): --  T(F): --  HR: --  BP: --  BP(mean): --  RR: --  SpO2: --    Orthostatic VS  05-17-24 @ 07:43  Lying BP: --/-- HR: --  Sitting BP: 152/72 HR: 90  Standing BP: 142/72 HR: 92  Site: --  Mode: --  Orthostatic VS  05-16-24 @ 10:12  Lying BP: --/-- HR: --  Sitting BP: 164/71 HR: 88  Standing BP: 156/74 HR: 92  Site: upper left arm  Mode: electronic

## 2024-05-17 NOTE — BH INPATIENT PSYCHIATRY PROGRESS NOTE - NSTXPSYCHOGOAL_PSY_ALL_CORE
Will report using a mindfulness skill to be able to read 5 pages of a book daily despite hallucinations
Make at least 5 reality based statements/requests to staff and/or peers
Will be able to report experiencing hallucinations to staff
Will identify 1 trigger/stressor that exacerbates hallucinations
Will report using a mindfulness skill to be able to read 5 pages of a book daily despite hallucinations
Will identify 1 trigger/stressor that exacerbates thoughts
Will report using a mindfulness skill to be able to read 5 pages of a book daily despite hallucinations
Make at least 5 reality based statements/requests to staff and/or peers
Make at least 5 reality based statements/requests to staff and/or peers
Will be able to report experiencing hallucinations to staff
Will report using a mindfulness skill to be able to read 5 pages of a book daily despite hallucinations
Will identify 2 coping skills that help mitigate hallucinations
Will be able to report experiencing hallucinations to staff
Will identify 1 trigger/stressor that exacerbates hallucinations
Will identify 2 coping skills that help mitigate hallucinations
Will report using a mindfulness skill to be able to read 5 pages of a book daily despite hallucinations
Will identify 1 trigger/stressor that exacerbates hallucinations
Will be able to report experiencing hallucinations to staff
Will identify 1 trigger/stressor that exacerbates thoughts
Will be able to report experiencing hallucinations to staff
Will be able to report experiencing hallucinations to staff
Will identify 1 trigger/stressor that exacerbates thoughts
Will be able to report experiencing hallucinations to staff
Will report using a mindfulness skill to be able to read 5 pages of a book daily despite hallucinations
Will be able to report experiencing hallucinations to staff
Will be able to report experiencing hallucinations to staff
Will identify 1 trigger/stressor that exacerbates thoughts
Will be able to report experiencing hallucinations to staff
Will report using a mindfulness skill to be able to read 5 pages of a book daily despite hallucinations
Make at least 5 reality based statements/requests to staff and/or peers
Will report using a mindfulness skill to be able to read 5 pages of a book daily despite hallucinations
Will report using a mindfulness skill to be able to read 5 pages of a book daily despite hallucinations
Will be able to report experiencing hallucinations to staff
Will identify 1 trigger/stressor that exacerbates thoughts
Will identify 1 trigger/stressor that exacerbates thoughts
Will report using a mindfulness skill to be able to read 5 pages of a book daily despite hallucinations
Will identify 1 trigger/stressor that exacerbates hallucinations
Will report using a mindfulness skill to be able to read 5 pages of a book daily despite hallucinations
Will be able to report experiencing hallucinations to staff
Will identify 1 trigger/stressor that exacerbates thoughts
Will identify 1 trigger/stressor that exacerbates hallucinations
Will identify 1 trigger/stressor that exacerbates thoughts
Will identify 1 trigger/stressor that exacerbates thoughts
Will report using a mindfulness skill to be able to read 5 pages of a book daily despite hallucinations
Will be able to report experiencing hallucinations to staff
Will identify 1 trigger/stressor that exacerbates thoughts
Will identify 1 trigger/stressor that exacerbates hallucinations
Will report using a mindfulness skill to be able to read 5 pages of a book daily despite hallucinations
Will identify 1 trigger/stressor that exacerbates hallucinations
Will report using a mindfulness skill to be able to read 5 pages of a book daily despite hallucinations
Will identify 1 trigger/stressor that exacerbates hallucinations
Will report using a mindfulness skill to be able to read 5 pages of a book daily despite hallucinations
Will identify 1 trigger/stressor that exacerbates hallucinations
Will identify 1 trigger/stressor that exacerbates hallucinations
Will be able to report experiencing hallucinations to staff
Will identify 1 trigger/stressor that exacerbates thoughts
Will be able to report experiencing hallucinations to staff
Will identify 1 trigger/stressor that exacerbates hallucinations
Will report using a mindfulness skill to be able to read 5 pages of a book daily despite hallucinations
Will identify 1 trigger/stressor that exacerbates hallucinations
Will identify 1 trigger/stressor that exacerbates thoughts
Will be able to report experiencing hallucinations to staff
Will be able to report experiencing hallucinations to staff
Will report using a mindfulness skill to be able to read 5 pages of a book daily despite hallucinations
Will identify 1 trigger/stressor that exacerbates hallucinations
Will identify 1 trigger/stressor that exacerbates thoughts
Will report using a mindfulness skill to be able to read 5 pages of a book daily despite hallucinations
Will report using a mindfulness skill to be able to read 5 pages of a book daily despite hallucinations
Will identify 1 trigger/stressor that exacerbates hallucinations
Will identify 1 trigger/stressor that exacerbates thoughts
Will identify 1 trigger/stressor that exacerbates thoughts
Will report using a mindfulness skill to be able to read 5 pages of a book daily despite hallucinations
Will identify 1 trigger/stressor that exacerbates hallucinations
Will be able to report experiencing hallucinations to staff
Will identify 2 coping skills that help mitigate hallucinations
Make at least 5 reality based statements/requests to staff and/or peers
Will identify 1 trigger/stressor that exacerbates hallucinations
Will identify 1 trigger/stressor that exacerbates thoughts
Will be able to report experiencing hallucinations to staff
Will report using a mindfulness skill to be able to read 5 pages of a book daily despite hallucinations
Will report using a mindfulness skill to be able to read 5 pages of a book daily despite hallucinations
Will be able to report experiencing hallucinations to staff
Will be able to report experiencing hallucinations to staff
Will identify 1 trigger/stressor that exacerbates hallucinations
Will identify 1 trigger/stressor that exacerbates hallucinations
Will be able to report experiencing hallucinations to staff
Will identify 1 trigger/stressor that exacerbates thoughts
Will identify 2 coping skills that help mitigate hallucinations
Will identify 2 coping skills that help mitigate hallucinations
Make at least 5 reality based statements/requests to staff and/or peers
Will be able to report experiencing hallucinations to staff
Will identify 1 trigger/stressor that exacerbates thoughts
Will report using a mindfulness skill to be able to read 5 pages of a book daily despite hallucinations
Will identify 1 trigger/stressor that exacerbates thoughts
Will be able to report experiencing hallucinations to staff
Will identify 1 trigger/stressor that exacerbates thoughts

## 2024-05-17 NOTE — BH CHART NOTE - NSBHPTASSESSDT_PSY_A_CORE
05-May-2024 10:57
10-May-2024 02:10
18-Feb-2024 14:56
18-Jan-2024 02:49
19-Jan-2024 03:09
13-Apr-2024 23:55
01-May-2024 09:26
17-May-2024 16:58
19-Apr-2024 17:42

## 2024-05-17 NOTE — ED ADULT NURSE NOTE - OBJECTIVE STATEMENT
This is 73 y/0 female from [facility], due to swelling of lower lip. Drowsy and yet answering questions properly. Care staff with patient reports that this is patients baseline. Past medical history of HTN, Siezure, Hypothyroid, DM2, HLD, Schizoprenia. Not in any distress, breathing is even and unlabored. Swollen lower lips, muffled speech. Chest is clear bilaterally, abdomen is soft, bowel sounds x 4. Vital sign stable.

## 2024-05-17 NOTE — BH INPATIENT PSYCHIATRY PROGRESS NOTE - NSTXCONFGOAL_PSY_ALL_CORE
Will accept reality orientation 3x a day
Will be able to define at least one strategy to compensate for confusion or memory impairment
Will accept reality orientation 3x a day
Will be able to identify when to ask for assistance
Will ask for assistance as required
Will ask for assistance as required
Will accept reality orientation 3x a day
Will be able to define at least one strategy to compensate for confusion or memory impairment
Will be able to define at least one strategy to compensate for confusion or memory impairment
Will accept reality orientation 3x a day
Will ask for assistance as required
Will accept reality orientation 3x a day
Will ask for assistance as required
Will be able to define at least one strategy to compensate for confusion or memory impairment
Will be able to identify when to ask for assistance
Other...
Will be able to define at least one strategy to compensate for confusion or memory impairment
Will be able to identify when to ask for assistance
Will accept reality orientation 3x a day
Will be able to define at least one strategy to compensate for confusion or memory impairment
Other...
Will accept reality orientation 3x a day
Will ask for assistance as required
Will accept reality orientation 3x a day
Will be able to identify when to ask for assistance
Other...
Will accept reality orientation 3x a day
Will be able to define at least one strategy to compensate for confusion or memory impairment
Will accept reality orientation 3x a day
Will be able to define at least one strategy to compensate for confusion or memory impairment
Will be able to identify when to ask for assistance
Will ask for assistance as required
Will be able to define at least one strategy to compensate for confusion or memory impairment
Will accept reality orientation 3x a day
Will ask for assistance as required
Will accept reality orientation 3x a day
Will accept reality orientation 3x a day
Will be able to define at least one strategy to compensate for confusion or memory impairment
Will accept reality orientation 3x a day
Will be able to identify when to ask for assistance
Will accept reality orientation 3x a day
Will ask for assistance as required
Will be able to define at least one strategy to compensate for confusion or memory impairment
Will be able to define at least one strategy to compensate for confusion or memory impairment
Will be able to identify when to ask for assistance
Will be able to identify when to ask for assistance
Will accept reality orientation 3x a day
Will be able to define at least one strategy to compensate for confusion or memory impairment
Will accept reality orientation 3x a day
Will accept reality orientation 3x a day
Will ask for assistance as required
Will be able to identify when to ask for assistance
Will accept reality orientation 3x a day
Will ask for assistance as required
Will be able to define at least one strategy to compensate for confusion or memory impairment
Will accept reality orientation 3x a day
Will accept reality orientation 3x a day
Will be able to define at least one strategy to compensate for confusion or memory impairment
Will be able to identify when to ask for assistance
Will accept reality orientation 3x a day
Will be able to define at least one strategy to compensate for confusion or memory impairment
Will be able to identify when to ask for assistance
Will ask for assistance as required
Will be able to define at least one strategy to compensate for confusion or memory impairment
Will be able to define at least one strategy to compensate for confusion or memory impairment
Will accept reality orientation 3x a day
Will ask for assistance as required
Will accept reality orientation 3x a day
Will be able to identify when to ask for assistance
Will accept reality orientation 3x a day
Will be able to define at least one strategy to compensate for confusion or memory impairment
Will be able to define at least one strategy to compensate for confusion or memory impairment
Will be able to identify when to ask for assistance
Other...
Will accept reality orientation 3x a day
Will ask for assistance as required
Will be able to identify when to ask for assistance
Other...
Will accept reality orientation 3x a day
Will be able to define at least one strategy to compensate for confusion or memory impairment
Will be able to identify when to ask for assistance
Will be able to define at least one strategy to compensate for confusion or memory impairment
Will accept reality orientation 3x a day
Will ask for assistance as required
Will ask for assistance as required
Will be able to identify when to ask for assistance

## 2024-05-17 NOTE — BH INPATIENT PSYCHIATRY PROGRESS NOTE - NSTXPROBDISORG_PSY_ALL_CORE
DISORGANIZATION OF THOUGHT/BEHAVIOR

## 2024-05-17 NOTE — BH INPATIENT PSYCHIATRY PROGRESS NOTE - NSTXVIOLNTPROGRES_PSY_ALL_CORE
Improving
No Change
Improving
No Change
Improving
Improving
No Change
Improving
No Change
No Change
Improving
Improving
No Change
Improving
Improving
No Change
No Change
Improving
Improving
No Change
Improving
Improving
No Change
Improving
No Change
Improving
No Change
Improving
No Change
Improving
Improving
No Change
Improving
No Change
Improving
No Change
No Change
Improving
No Change
Improving
No Change
Worsening
Improving
Improving
No Change
Improving
No Change
Improving
Improving
No Change
Improving
No Change
Improving
No Change
Improving
Improving
No Change
Improving
No Change
Improving
No Change
Improving
Improving

## 2024-05-17 NOTE — BH INPATIENT PSYCHIATRY PROGRESS NOTE - NSTXCONFINTERMD_PSY_ALL_CORE
psychopharmacology

## 2024-05-17 NOTE — BH INPATIENT PSYCHIATRY PROGRESS NOTE - NSTXPROBPSYCHO_PSY_ALL_CORE
Detail Level: Zone
PSYCHOTIC SYMPTOMS

## 2024-05-17 NOTE — BH INPATIENT PSYCHIATRY PROGRESS NOTE - NSTXDISORGDATETRGT_PSY_ALL_CORE
14-Feb-2024
13-Mar-2024
13-Mar-2024
25-Apr-2024
20-Apr-2024
25-Jan-2024
13-Mar-2024
14-Feb-2024
25-Apr-2024
25-Apr-2024
13-Mar-2024
20-Apr-2024
25-Apr-2024
25-Jan-2024
14-Feb-2024
24-Jan-2024
25-Apr-2024
13-Mar-2024
14-Feb-2024
25-Jan-2024
25-Jan-2024
13-Mar-2024
24-Jan-2024
25-Apr-2024
13-Mar-2024
24-Jan-2024
25-Apr-2024
14-Feb-2024
24-Apr-2024
25-Jan-2024
25-Apr-2024
13-Mar-2024
20-Apr-2024
25-Jan-2024
13-Mar-2024
13-Mar-2024
14-Feb-2024
13-Mar-2024
14-Feb-2024
24-Jan-2024
14-Feb-2024
14-Feb-2024
25-Jan-2024
14-Feb-2024
25-Apr-2024
25-Apr-2024
13-Mar-2024
13-Mar-2024
14-Feb-2024
14-Feb-2024
25-Apr-2024
14-Feb-2024
13-Mar-2024
25-Apr-2024
14-Feb-2024
25-Apr-2024
25-Jan-2024
25-Jan-2024
14-Feb-2024
13-Mar-2024
13-Mar-2024
25-Apr-2024
14-Feb-2024
13-Mar-2024
14-Feb-2024
24-Jan-2024
25-Apr-2024
13-Mar-2024
25-Apr-2024
13-Mar-2024
25-Jan-2024
13-Mar-2024
25-Apr-2024
25-Jan-2024
14-Feb-2024
25-Apr-2024
16-Apr-2024
25-Jan-2024
13-Mar-2024
25-Apr-2024
14-Feb-2024
25-Jan-2024
13-Mar-2024
14-Feb-2024
14-Feb-2024
25-Apr-2024
25-Jan-2024
14-Feb-2024
14-Feb-2024
25-Apr-2024
13-Mar-2024
25-Apr-2024
18-Apr-2024
25-Apr-2024
14-Feb-2024
24-Jan-2024
13-Mar-2024

## 2024-05-17 NOTE — BH INPATIENT PSYCHIATRY PROGRESS NOTE - NSTXDIABPROGRES_PSY_ALL_CORE
No Change
Improving
No Change
Improving
Improving
No Change
No Change
Improving
No Change
Improving
No Change
Improving
No Change
No Change
Improving
No Change
Improving
Improving
No Change
Improving
No Change
No Change
Improving
No Change
Improving
No Change
Improving
No Change
Improving
Improving
No Change
Improving
Improving
No Change
Improving
Improving
No Change
Improving
No Change
Improving
No Change
Improving
Improving

## 2024-05-17 NOTE — BH INPATIENT PSYCHIATRY PROGRESS NOTE - NSBHPROGSUIC_PSY_A_CORE
no

## 2024-05-17 NOTE — BH INPATIENT PSYCHIATRY PROGRESS NOTE - NSTXCONFDATETRGT_PSY_ALL_CORE
20-Feb-2024
25-Jan-2024
23-Mar-2024
25-Apr-2024
25-Jan-2024
23-Mar-2024
20-Feb-2024
25-Jan-2024
13-Mar-2024
13-Mar-2024
25-Apr-2024
20-Feb-2024
25-Apr-2024
20-Apr-2024
20-Feb-2024
23-Mar-2024
24-Apr-2024
18-Apr-2024
25-Apr-2024
25-Jan-2024
20-Feb-2024
23-Mar-2024
14-Feb-2024
20-Feb-2024
25-Apr-2024
25-Jan-2024
25-Jan-2024
25-Apr-2024
20-Feb-2024
23-Mar-2024
13-Mar-2024
25-Jan-2024
25-Jan-2024
20-Feb-2024
25-Apr-2024
13-Mar-2024
23-Mar-2024
23-Mar-2024
13-Mar-2024
25-Apr-2024
25-Apr-2024
25-Jan-2024
25-Jan-2024
20-Feb-2024
23-Mar-2024
25-Jan-2024
20-Feb-2024
23-Mar-2024
25-Apr-2024
23-Mar-2024
25-Apr-2024
25-Jan-2024
14-Feb-2024
25-Jan-2024
14-Feb-2024
23-Mar-2024
25-Apr-2024
13-Mar-2024
23-Mar-2024
25-Apr-2024
14-Feb-2024
23-Mar-2024
23-Mar-2024
25-Apr-2024
25-Jan-2024
25-Jan-2024
25-Apr-2024
23-Mar-2024
20-Apr-2024
25-Apr-2024
25-Jan-2024
13-Mar-2024
23-Mar-2024
25-Jan-2024
20-Feb-2024
23-Mar-2024
25-Apr-2024
13-Mar-2024
20-Feb-2024
25-Jan-2024
14-Feb-2024
20-Feb-2024
20-Feb-2024
25-Apr-2024
25-Apr-2024
13-Mar-2024
25-Apr-2024
25-Apr-2024
20-Feb-2024
25-Apr-2024
14-Feb-2024
20-Feb-2024
25-Jan-2024
25-Apr-2024
25-Apr-2024
23-Mar-2024
20-Apr-2024
25-Apr-2024
16-Apr-2024
23-Mar-2024

## 2024-05-17 NOTE — BH INPATIENT PSYCHIATRY PROGRESS NOTE - NSTXDIABGOAL_PSY_ALL_CORE
Will identify modifiable risk factors and strategies to counteract them
Will verbalize 2 signs and symptoms of hypo and hyperglycemia
Will identify modifiable risk factors and strategies to counteract them
Will identify modifiable risk factors and strategies to counteract them
Will verbalize 2 signs and symptoms of hypo and hyperglycemia
Will identify modifiable risk factors and strategies to counteract them
Will verbalize 2 signs and symptoms of hypo and hyperglycemia
Will be able to able to describe prescribed diabetic medications and how to properly take these medications
Will identify modifiable risk factors and strategies to counteract them
Will be able to able to describe prescribed diabetic medications and how to properly take these medications
Will identify modifiable risk factors and strategies to counteract them
Will verbalize 2 signs and symptoms of hypo and hyperglycemia
Will be able to able to describe prescribed diabetic medications and how to properly take these medications
Will identify modifiable risk factors and strategies to counteract them
Will identify modifiable risk factors and strategies to counteract them
Will verbalize 2 signs and symptoms of hypo and hyperglycemia
Will identify modifiable risk factors and strategies to counteract them
Will be able to able to describe prescribed diabetic medications and how to properly take these medications
Will identify modifiable risk factors and strategies to counteract them
Will verbalize 2 signs and symptoms of hypo and hyperglycemia
Will identify modifiable risk factors and strategies to counteract them
Will verbalize 2 signs and symptoms of hypo and hyperglycemia
Will identify modifiable risk factors and strategies to counteract them
Will verbalize 2 signs and symptoms of hypo and hyperglycemia
Will be able to able to describe prescribed diabetic medications and how to properly take these medications
Will identify modifiable risk factors and strategies to counteract them
Will be able to able to describe prescribed diabetic medications and how to properly take these medications
Will identify modifiable risk factors and strategies to counteract them
Will verbalize 2 signs and symptoms of hypo and hyperglycemia
Will identify modifiable risk factors and strategies to counteract them
Will be able to able to describe prescribed diabetic medications and how to properly take these medications
Will identify modifiable risk factors and strategies to counteract them
Will be able to able to describe prescribed diabetic medications and how to properly take these medications
Will be able to able to describe prescribed diabetic medications and how to properly take these medications
Will identify modifiable risk factors and strategies to counteract them
Will be able to able to describe prescribed diabetic medications and how to properly take these medications
Will identify modifiable risk factors and strategies to counteract them
Will verbalize 2 signs and symptoms of hypo and hyperglycemia
Will identify modifiable risk factors and strategies to counteract them
Will be able to able to describe prescribed diabetic medications and how to properly take these medications
Will identify modifiable risk factors and strategies to counteract them
Will identify modifiable risk factors and strategies to counteract them
Will verbalize 2 signs and symptoms of hypo and hyperglycemia
Will identify modifiable risk factors and strategies to counteract them
Will verbalize 2 signs and symptoms of hypo and hyperglycemia
Will verbalize 2 signs and symptoms of hypo and hyperglycemia
Will identify modifiable risk factors and strategies to counteract them
Will identify modifiable risk factors and strategies to counteract them
Will verbalize 2 signs and symptoms of hypo and hyperglycemia
Will be able to able to describe prescribed diabetic medications and how to properly take these medications
Will be able to able to describe prescribed diabetic medications and how to properly take these medications
Will verbalize 2 signs and symptoms of hypo and hyperglycemia
Will verbalize 2 signs and symptoms of hypo and hyperglycemia
Will identify modifiable risk factors and strategies to counteract them
Will verbalize 2 signs and symptoms of hypo and hyperglycemia
Will be able to able to describe prescribed diabetic medications and how to properly take these medications
Will identify modifiable risk factors and strategies to counteract them
Will verbalize 2 signs and symptoms of hypo and hyperglycemia
Will identify modifiable risk factors and strategies to counteract them
Will be able to able to describe prescribed diabetic medications and how to properly take these medications
Will verbalize 2 signs and symptoms of hypo and hyperglycemia
Will identify modifiable risk factors and strategies to counteract them
Will identify modifiable risk factors and strategies to counteract them
Will be able to able to describe prescribed diabetic medications and how to properly take these medications
Will identify modifiable risk factors and strategies to counteract them

## 2024-05-17 NOTE — BH INPATIENT PSYCHIATRY PROGRESS NOTE - NSTXFALLDATETRGT_PSY_ALL_CORE
25-Apr-2024
20-Apr-2024
25-Apr-2024
31-Mar-2024
31-Mar-2024
25-Apr-2024
31-Mar-2024
31-Mar-2024
25-Apr-2024
31-Mar-2024
25-Apr-2024
18-Apr-2024
31-Mar-2024
16-Apr-2024
25-Apr-2024
20-Apr-2024
25-Apr-2024
25-Apr-2024
31-Mar-2024
25-Apr-2024
31-Mar-2024
20-Apr-2024
31-Mar-2024
20-Apr-2024
25-Apr-2024
31-Mar-2024
25-Apr-2024
31-Mar-2024
25-Apr-2024
31-Mar-2024

## 2024-05-17 NOTE — BH INPATIENT PSYCHIATRY PROGRESS NOTE - NSTXIMPULSGOAL_PSY_ALL_CORE
Will identify 1 thought that precedes an impulsive acts
Will be able to demonstrate the ability to pause before acting out negatively
Will identify 1 thought that precedes an impulsive acts
Will be able to demonstrate the ability to pause before acting out negatively
Will identify 1 thought that precedes an impulsive acts
Will be able to demonstrate the ability to pause before acting out negatively
Will identify 1 thought that precedes an impulsive acts
Will identify 1 thought that precedes an impulsive acts
Will be able to demonstrate the ability to pause before acting out negatively
Will identify 1 thought that precedes an impulsive acts
Will be able to demonstrate the ability to pause before acting out negatively
Will identify 1 thought that precedes an impulsive acts
Will be able to demonstrate the ability to pause before acting out negatively
Will be able to demonstrate the ability to pause before acting out negatively
Will identify 1 thought that precedes an impulsive acts
Will be able to demonstrate the ability to pause before acting out negatively
Will be able to demonstrate the ability to pause before acting out negatively
Will identify 1 thought that precedes an impulsive acts
Will be able to demonstrate the ability to pause before acting out negatively
Will identify 1 thought that precedes an impulsive acts
Will be able to demonstrate the ability to pause before acting out negatively
Will identify 1 thought that precedes an impulsive acts
Will be able to demonstrate the ability to pause before acting out negatively
Will identify 1 thought that precedes an impulsive acts
Will be able to demonstrate the ability to pause before acting out negatively
Will identify 1 thought that precedes an impulsive acts
Will be able to demonstrate the ability to pause before acting out negatively
Will be able to demonstrate the ability to pause before acting out negatively
Will identify 1 thought that precedes an impulsive acts
Will be able to demonstrate the ability to pause before acting out negatively
Will identify 1 thought that precedes an impulsive acts
Will be able to demonstrate the ability to pause before acting out negatively
Will identify 1 thought that precedes an impulsive acts
Will identify 1 thought that precedes an impulsive acts
Will be able to demonstrate the ability to pause before acting out negatively
Will be able to demonstrate the ability to pause before acting out negatively
Will identify 1 thought that precedes an impulsive acts
Will identify 1 thought that precedes an impulsive acts
Will be able to demonstrate the ability to pause before acting out negatively
Will identify 1 thought that precedes an impulsive acts
Will identify 1 thought that precedes an impulsive acts
Will be able to demonstrate the ability to pause before acting out negatively
Will identify 1 thought that precedes an impulsive acts
Will be able to demonstrate the ability to pause before acting out negatively
Will identify 1 thought that precedes an impulsive acts
Will be able to demonstrate the ability to pause before acting out negatively
Will identify 1 thought that precedes an impulsive acts
Will be able to demonstrate the ability to pause before acting out negatively
Will identify 1 thought that precedes an impulsive acts
Will be able to demonstrate the ability to pause before acting out negatively
Will identify 1 thought that precedes an impulsive acts

## 2024-05-17 NOTE — BH INPATIENT PSYCHIATRY PROGRESS NOTE - NSTXCOPEDATETRGT_PSY_ALL_CORE
06-May-2024
01-Apr-2024
21-Feb-2024
30-Mar-2024
01-Apr-2024
01-Apr-2024
16-May-2024
28-Feb-2024
01-Feb-2024
05-Mar-2024
21-Feb-2024
20-May-2024
25-Apr-2024
25-Mar-2024
29-Apr-2024
13-Mar-2024
07-Feb-2024
01-Feb-2024
22-Apr-2024
01-Feb-2024
14-Feb-2024
28-Feb-2024
20-May-2024
01-Feb-2024
18-Mar-2024
05-Mar-2024
21-Feb-2024
28-Feb-2024
07-Feb-2024
08-Apr-2024
13-May-2024
13-May-2024
25-Mar-2024
07-Feb-2024
13-Mar-2024
29-Apr-2024
25-Mar-2024
05-Mar-2024
06-May-2024
18-Mar-2024
25-Mar-2024
08-Apr-2024
20-May-2024
29-Apr-2024
01-Apr-2024
06-May-2024
01-Feb-2024
18-Mar-2024
07-Feb-2024
11-Mar-2024
16-May-2024
28-Feb-2024
22-Apr-2024
28-Feb-2024
28-Feb-2024
07-Feb-2024
18-Mar-2024
21-Feb-2024
01-Apr-2024
01-Feb-2024
05-Mar-2024
30-Mar-2024
07-Feb-2024
18-Mar-2024
14-Feb-2024
08-Apr-2024
16-May-2024
25-Mar-2024
06-May-2024
16-Apr-2024
22-Apr-2024
25-Mar-2024
29-Apr-2024
08-Apr-2024
13-May-2024
13-May-2024
20-May-2024
14-Feb-2024
15-Apr-2024
20-May-2024
14-Feb-2024
13-Mar-2024
18-Apr-2024
14-Feb-2024
06-May-2024
11-Mar-2024
13-Mar-2024
29-Apr-2024
05-Mar-2024
14-Feb-2024
11-Mar-2024
22-Apr-2024
07-Feb-2024
15-Apr-2024
06-May-2024
29-Apr-2024
06-May-2024
29-Apr-2024
01-Apr-2024
08-Apr-2024

## 2024-05-17 NOTE — BH INPATIENT PSYCHIATRY PROGRESS NOTE - NSTXPROBIMPULS_PSY_ALL_CORE
IMPULSIVITY/AGITATION

## 2024-05-17 NOTE — BH INPATIENT PSYCHIATRY PROGRESS NOTE - NSBHCONSULTIPREASON_PSY_A_CORE
danger to others; mental illness expected to respond to inpatient care

## 2024-05-17 NOTE — BH INPATIENT PSYCHIATRY PROGRESS NOTE - NSBHLEGALSTATUSCHANGE_PSY_ALL_CORE
No
Yes...
No

## 2024-05-17 NOTE — BH INPATIENT PSYCHIATRY PROGRESS NOTE - NSTXDISORGINTERMD_PSY_ALL_CORE
change Seroquel to olanzapine
psychopharmacology
psychopharmacology
change Seroquel to olanzapine
psychopharmacology
change Seroquel to olanzapine
change Seroquel to olanzapine
psychopharmacology
change Seroquel to olanzapine
psychopharmacology
change Seroquel to olanzapine
change Seroquel to olanzapine
psychopharmacology
change Seroquel to olanzapine
psychopharmacology
change Seroquel to olanzapine
psychopharmacology
change Seroquel to olanzapine
psychopharmacology
change Seroquel to olanzapine
psychopharmacology
psychopharmacology
change Seroquel to olanzapine
psychopharmacology
change Seroquel to olanzapine
change Seroquel to olanzapine
psychopharmacology
change Seroquel to olanzapine
change Seroquel to olanzapine
psychopharmacology
psychopharmacology
change Seroquel to olanzapine
psychopharmacology
change Seroquel to olanzapine
psychopharmacology
psychopharmacology
change Seroquel to olanzapine
psychopharmacology
change Seroquel to olanzapine
psychopharmacology
psychopharmacology
change Seroquel to olanzapine
change Seroquel to olanzapine
psychopharmacology
change Seroquel to olanzapine
psychopharmacology
change Seroquel to olanzapine
psychopharmacology
change Seroquel to olanzapine
change Seroquel to olanzapine
psychopharmacology
change Seroquel to olanzapine
psychopharmacology
change Seroquel to olanzapine
psychopharmacology
change Seroquel to olanzapine

## 2024-05-17 NOTE — ED PROVIDER NOTE - PHYSICAL EXAMINATION
GENERAL: Awake, drowsy, NAD  HEENT: NC/AT, moist mucous membranes, PERRL, EOMI, swelling of the bottom lip  LUNGS: CTAB, no wheezes or crackles   CARDIAC: RRR, no m/r/g  ABDOMEN: Soft, non tender, non distended, no rebound, no guarding  BACK: No midline spinal tenderness, no CVA tenderness  EXT: No edema, no calf tenderness, 2+ DP pulses bilaterally, no deformities.  NEURO: Moving all extremities.  SKIN: Warm and dry. No rash.

## 2024-05-17 NOTE — BH INPATIENT PSYCHIATRY PROGRESS NOTE - NSTXDISORGPROGRES_PSY_ALL_CORE
Improving
Improving
No Change
Improving
No Change
Improving
No Change
No Change
Improving
No Change
Improving
No Change
Improving
Improving
No Change
No Change
Improving
Improving
No Change
Improving
No Change
Improving
No Change
No Change
Improving
Improving
No Change
Improving
No Change
No Change
Improving
Improving
Worsening
Improving
Improving
No Change
Improving
No Change
Improving
No Change
Improving
No Change
No Change
Improving
Improving
No Change
Improving
No Change
No Change
Improving
No Change
Improving

## 2024-05-17 NOTE — BH INPATIENT PSYCHIATRY PROGRESS NOTE - NSTXPSYCHODATETRGT_PSY_ALL_CORE
20-Feb-2024
25-Apr-2024
25-Jan-2024
18-Apr-2024
20-Apr-2024
20-Feb-2024
20-Feb-2024
25-Apr-2024
25-Jan-2024
13-Mar-2024
20-Feb-2024
24-Jan-2024
25-Apr-2024
25-Jan-2024
23-Mar-2024
23-Mar-2024
25-Apr-2024
25-Jan-2024
15-Apr-2024
25-Jan-2024
13-Mar-2024
25-Jan-2024
25-Jan-2024
23-Mar-2024
25-Apr-2024
20-Apr-2024
23-Mar-2024
25-Jan-2024
20-Feb-2024
25-Apr-2024
13-Mar-2024
23-Mar-2024
20-Apr-2024
20-Feb-2024
23-Mar-2024
24-Jan-2024
23-Mar-2024
20-Feb-2024
25-Jan-2024
25-Jan-2024
20-Feb-2024
25-Jan-2024
20-Feb-2024
25-Apr-2024
25-Jan-2024
25-Jan-2024
20-Feb-2024
25-Apr-2024
13-Mar-2024
25-Jan-2024
23-Mar-2024
13-Mar-2024
20-Feb-2024
25-Apr-2024
13-Mar-2024
23-Mar-2024
25-Jan-2024
23-Mar-2024
25-Apr-2024
25-Apr-2024
23-Mar-2024
13-Mar-2024
13-Mar-2024
24-Jan-2024
25-Jan-2024
13-Mar-2024
20-Apr-2024
23-Mar-2024
23-Mar-2024
25-Apr-2024
20-Feb-2024
20-Feb-2024
23-Mar-2024
23-Mar-2024
24-Jan-2024
25-Apr-2024
25-Jan-2024
20-Feb-2024
25-Apr-2024
24-Jan-2024
25-Jan-2024
23-Mar-2024
25-Apr-2024
25-Apr-2024
20-Feb-2024
16-Apr-2024
25-Apr-2024
25-Apr-2024
23-Mar-2024
25-Jan-2024
25-Apr-2024
20-Feb-2024
24-Jan-2024
25-Jan-2024
25-Apr-2024
20-Feb-2024
25-Apr-2024
25-Apr-2024
23-Mar-2024
23-Mar-2024
20-Feb-2024
23-Mar-2024

## 2024-05-17 NOTE — BH INPATIENT PSYCHIATRY PROGRESS NOTE - NSBHATTESTBILLING_PSY_A_CORE
38729-Psqazbpjaz OBS or IP - moderate complexity OR 35-49 mins
96664-Mywkjdtnqj OBS or IP - moderate complexity OR 35-49 mins
06106-Kvcxqkhtaw OBS or IP - moderate complexity OR 35-49 mins
29417-Vwedgtqvjr OBS or IP - moderate complexity OR 35-49 mins
38327-Przgdzsbxu OBS or IP - moderate complexity OR 35-49 mins
55392-Bbqhkllzwc OBS or IP - moderate complexity OR 35-49 mins
60842-Wmjvhpmype OBS or IP - moderate complexity OR 35-49 mins
34710-Mqvmalwjtd OBS or IP - moderate complexity OR 35-49 mins
63997-Ixeevadcwf OBS or IP - moderate complexity OR 35-49 mins
16521-Ahvcxtrvuu OBS or IP - moderate complexity OR 35-49 mins
00552-Lmcmpcmcnn OBS or IP - moderate complexity OR 35-49 mins
09138-Qmjssalczi OBS or IP - moderate complexity OR 35-49 mins
53288-Ubftejkzoy OBS or IP - low complexity OR 25-34 mins
29788-Asjiddtfkz OBS or IP - moderate complexity OR 35-49 mins
70033-Nleuwyhjla OBS or IP - moderate complexity OR 35-49 mins
45031-Ggersahpsx OBS or IP - moderate complexity OR 35-49 mins
46121-Fczdblaytg OBS or IP - moderate complexity OR 35-49 mins
55408-Ubwgavorrg OBS or IP - moderate complexity OR 35-49 mins
88152-Ieekicvolp OBS or IP - moderate complexity OR 35-49 mins
91921-Laexpfukwr OBS or IP - moderate complexity OR 35-49 mins
01424-Ourubtfcfo OBS or IP - moderate complexity OR 35-49 mins
99319-Gbttnfqcmj OBS or IP - moderate complexity OR 35-49 mins
85438-Dnjemtkfxt OBS or IP - low complexity OR 25-34 mins
47680-Uxhsszitjk OBS or IP - moderate complexity OR 35-49 mins
54192-Cwykqpbrme OBS or IP - moderate complexity OR 35-49 mins
43430-Gzfdpbnpgy OBS or IP - moderate complexity OR 35-49 mins
95194-Kynrhnzabi OBS or IP - moderate complexity OR 35-49 mins
13171-Jhthfkhswm OBS or IP - moderate complexity OR 35-49 mins
80093-Isamlutfwy OBS or IP - moderate complexity OR 35-49 mins
86017-Krsrbaqriq OBS or IP - low complexity OR 25-34 mins
86241-Tgnnygcjqo OBS or IP - low complexity OR 25-34 mins
45991-Ndpfwnaogn OBS or IP - low complexity OR 25-34 mins
29722-Vgxozxreii OBS or IP - moderate complexity OR 35-49 mins
71262-Vwygpnjjux OBS or IP - moderate complexity OR 35-49 mins
97944-Yzovfkwldh OBS or IP - moderate complexity OR 35-49 mins
02619-Oredwjbecc OBS or IP - low complexity OR 25-34 mins
09183-Kmurwkfmjr OBS or IP - low complexity OR 25-34 mins
46174-Vbosoqdomj OBS or IP - moderate complexity OR 35-49 mins
86469-Pmuakclzeg OBS or IP - moderate complexity OR 35-49 mins
98372-Nmsblceyse OBS or IP - moderate complexity OR 35-49 mins
46968-Qmxgytpuzv OBS or IP - moderate complexity OR 35-49 mins
00489-Zmitphmpus OBS or IP - moderate complexity OR 35-49 mins
04187-Qeegnmbruu OBS or IP - moderate complexity OR 35-49 mins
38728-Veqobpnwod OBS or IP - low complexity OR 25-34 mins
13123-Lyowzfwizv OBS or IP - moderate complexity OR 35-49 mins
40759-Hobfwxvsrk OBS or IP - low complexity OR 25-34 mins
62739-Zqnoggvyrf OBS or IP - low complexity OR 25-34 mins
10994-Zhgsnogreu OBS or IP - low complexity OR 25-34 mins
16514-Ecfzjpksfw OBS or IP - moderate complexity OR 35-49 mins
78978-Opcpukkgfm OBS or IP - low complexity OR 25-34 mins
79520-Rwmelfrkcr OBS or IP - moderate complexity OR 35-49 mins
91475-Mjypmlppps OBS or IP - moderate complexity OR 35-49 mins
26363-Gdppaisfxm OBS or IP - moderate complexity OR 35-49 mins
53098-Hnupghdomm OBS or IP - low complexity OR 25-34 mins
94485-Rylyqnmbjh OBS or IP - low complexity OR 25-34 mins
09630-Mruioswvew OBS or IP - moderate complexity OR 35-49 mins
55372-Ssligxtuzr OBS or IP - moderate complexity OR 35-49 mins
94037-Nmspckkwtt OBS or IP - moderate complexity OR 35-49 mins
10901-Zkgcbuhseb OBS or IP - moderate complexity OR 35-49 mins
44744-Idbgqysvbx OBS or IP - moderate complexity OR 35-49 mins
60361-Prunulfjyp OBS or IP - moderate complexity OR 35-49 mins
27501-Ftsnrewazt OBS or IP - moderate complexity OR 35-49 mins
33096-Lrknuhjvby OBS or IP - moderate complexity OR 35-49 mins
63609-Zjpcsxkpav OBS or IP - moderate complexity OR 35-49 mins
71411-Zfzfftbbqj OBS or IP - low complexity OR 25-34 mins
76660-Lyaygpzrze OBS or IP - moderate complexity OR 35-49 mins
02049-Trmwcrmihz OBS or IP - moderate complexity OR 35-49 mins
33212-Pynmqdacdt OBS or IP - moderate complexity OR 35-49 mins
38290-Wabzbsvudn OBS or IP - low complexity OR 25-34 mins
53879-Mcqzrivxsa OBS or IP - moderate complexity OR 35-49 mins
64116-Jiksajsyiy OBS or IP - moderate complexity OR 35-49 mins
86118-Mvorzjkuna OBS or IP - moderate complexity OR 35-49 mins
15066-Brfcodikhq OBS or IP - moderate complexity OR 35-49 mins
24006-Jowixhwebl OBS or IP - moderate complexity OR 35-49 mins
16425-Hgezuyrokz OBS or IP - low complexity OR 25-34 mins
09235-Hobdtpoyae OBS or IP - moderate complexity OR 35-49 mins
18047-Wlyclqohyw OBS or IP - moderate complexity OR 35-49 mins
72881-Alyubiiifd OBS or IP - moderate complexity OR 35-49 mins
27902-Wgakwnuliv OBS or IP - moderate complexity OR 35-49 mins
77084-Vwstxwbwpj OBS or IP - moderate complexity OR 35-49 mins
71136-Xvwwgqqnzu OBS or IP - moderate complexity OR 35-49 mins
99282-Emergency Department visit - straightforward complexity
13654-Tpasqmbjuq OBS or IP - moderate complexity OR 35-49 mins
51507-Mcvqsgjnnb OBS or IP - moderate complexity OR 35-49 mins
89539-Dobcjghsku OBS or IP - moderate complexity OR 35-49 mins
63999-Aoqjgsxzst OBS or IP - low complexity OR 25-34 mins
72893-Ogfgvgdbgv OBS or IP - moderate complexity OR 35-49 mins
40553-Axjwerykqv OBS or IP - low complexity OR 25-34 mins
57826-Xveybsxdiw OBS or IP - moderate complexity OR 35-49 mins
00808-Twhklryabt OBS or IP - low complexity OR 25-34 mins
40722-Jkymvnnlye OBS or IP - moderate complexity OR 35-49 mins
59299-Kqssqjqzsu OBS or IP - moderate complexity OR 35-49 mins
62575-Bypgfrvyfu OBS or IP - low complexity OR 25-34 mins
63202-Nollovprhl OBS or IP - moderate complexity OR 35-49 mins
01954-Nbbemogbqm OBS or IP - low complexity OR 25-34 mins
02780-Blyqvevuej OBS or IP - moderate complexity OR 35-49 mins
16547-Eacvtjfbke OBS or IP - low complexity OR 25-34 mins
29683-Bdjpvycggh OBS or IP - moderate complexity OR 35-49 mins
77841-Fzfdumeciu OBS or IP - moderate complexity OR 35-49 mins
78982-Helmkmydri OBS or IP - moderate complexity OR 35-49 mins
95072-Liogimloda OBS or IP - moderate complexity OR 35-49 mins
02296-Cqgqxppdld OBS or IP - low complexity OR 25-34 mins
03130-Fbidurrnwo OBS or IP - moderate complexity OR 35-49 mins
52435-Chfbayylsy OBS or IP - moderate complexity OR 35-49 mins
58315-Ajofqehpsh OBS or IP - moderate complexity OR 35-49 mins
26176-Tiovnwawyx OBS or IP - moderate complexity OR 35-49 mins
37662-Bwqynlonod OBS or IP - moderate complexity OR 35-49 mins

## 2024-05-17 NOTE — BH INPATIENT PSYCHIATRY PROGRESS NOTE - NSTXDISORGDATEEST_PSY_ALL_CORE
06-Mar-2024
13-Apr-2024
06-Mar-2024
06-Mar-2024
18-Apr-2024
06-Mar-2024
07-Feb-2024
07-Feb-2024
18-Apr-2024
07-Feb-2024
06-Mar-2024
06-Mar-2024
07-Feb-2024
18-Apr-2024
07-Feb-2024
06-Mar-2024
06-Mar-2024
07-Feb-2024
11-Apr-2024
06-Mar-2024
24-Jan-2024
07-Feb-2024
07-Feb-2024
24-Jan-2024
06-Mar-2024
18-Apr-2024
07-Feb-2024
18-Apr-2024
13-Apr-2024
18-Apr-2024
24-Jan-2024
06-Mar-2024
06-Mar-2024
18-Apr-2024
07-Feb-2024
18-Apr-2024
17-Jan-2024
06-Mar-2024
17-Jan-2024
18-Apr-2024
18-Apr-2024
24-Jan-2024
17-Jan-2024
24-Jan-2024
06-Mar-2024
06-Mar-2024
17-Jan-2024
07-Feb-2024
07-Feb-2024
18-Apr-2024
24-Jan-2024
06-Mar-2024
07-Feb-2024
17-Apr-2024
24-Jan-2024
24-Jan-2024
06-Mar-2024
18-Apr-2024
07-Feb-2024
24-Jan-2024
07-Feb-2024
18-Apr-2024
06-Mar-2024
07-Feb-2024
18-Apr-2024
24-Jan-2024
06-Mar-2024
07-Feb-2024
24-Jan-2024
07-Feb-2024
07-Feb-2024
18-Apr-2024
06-Mar-2024
07-Feb-2024
06-Mar-2024
18-Apr-2024
18-Apr-2024
24-Jan-2024
18-Apr-2024
13-Apr-2024
17-Jan-2024
06-Mar-2024
18-Apr-2024
18-Apr-2024
09-Apr-2024
18-Apr-2024
24-Jan-2024
06-Mar-2024
18-Apr-2024
24-Jan-2024
06-Mar-2024
07-Feb-2024
17-Jan-2024
06-Mar-2024
07-Feb-2024
18-Apr-2024
06-Mar-2024
06-Mar-2024
18-Apr-2024
06-Mar-2024

## 2024-05-17 NOTE — BH INPATIENT PSYCHIATRY PROGRESS NOTE - TELEPSYCHIATRY?
No

## 2024-05-17 NOTE — BH INPATIENT PSYCHIATRY PROGRESS NOTE - NSTXPSYCHOPROGRES_PSY_ALL_CORE
Improving
Improving
No Change
Improving
No Change
No Change
Improving
No Change
Improving
Improving
No Change
No Change
Improving
No Change
Improving
No Change
Improving
No Change
Improving
No Change
Improving
No Change
No Change
Improving
No Change
Improving
Improving
No Change
Improving
No Change
No Change
Improving
Improving
No Change
Improving
No Change
Improving
No Change
Improving
Improving
No Change
Improving
No Change
Improving
No Change
Improving
No Change
Improving
No Change
No Change
Improving
Improving
No Change
Improving
No Change
No Change
Improving
No Change
Improving
No Change
Improving
Improving
No Change
Improving
No Change
Improving

## 2024-05-17 NOTE — BH INPATIENT PSYCHIATRY PROGRESS NOTE - NSBHATTESTTYPEVISIT_PSY_A_CORE
Attending Only
MICHELLE without on-site Attending supervision
Attending Only
MICHELLE without on-site Attending supervision
Attending Only
MICHELLE without on-site Attending supervision
Attending Only
MICHELLE without on-site Attending supervision
Attending Only
MICHELLE without on-site Attending supervision
Attending Only
MICHELLE without on-site Attending supervision
Attending Only

## 2024-05-17 NOTE — BH INPATIENT PSYCHIATRY PROGRESS NOTE - NS ED BHA REVIEW OF ED CHART VITAL SIGNS REVIEWED
Yes

## 2024-05-17 NOTE — BH INPATIENT PSYCHIATRY PROGRESS NOTE - NSTXDCOTHRDATETRGT_PSY_ALL_CORE
25-Mar-2024
07-Mar-2024
18-Mar-2024
25-Apr-2024
14-May-2024
25-Apr-2024
02-May-2024
25-Mar-2024
01-Apr-2024
06-May-2024
14-Feb-2024
14-Feb-2024
01-Apr-2024
25-Jan-2024
09-Apr-2024
07-Mar-2024
21-Feb-2024
20-May-2024
25-Jan-2024
29-Feb-2024
25-Jan-2024
15-Apr-2024
01-Apr-2024
06-May-2024
25-Jan-2024
14-Mar-2024
29-Feb-2024
29-Feb-2024
15-Apr-2024
18-Mar-2024
25-Mar-2024
20-May-2024
02-May-2024
18-Mar-2024
25-Apr-2024
25-Jan-2024
21-Feb-2024
06-May-2024
07-Mar-2024
15-Apr-2024
01-Apr-2024
09-Apr-2024
25-Mar-2024
07-Mar-2024
21-Feb-2024
08-Feb-2024
21-Feb-2024
25-Jan-2024
15-Apr-2024
08-Feb-2024
08-Feb-2024
14-Feb-2024
01-Feb-2024
14-May-2024
15-Apr-2024
25-Jan-2024
06-May-2024
08-Feb-2024
14-Feb-2024
25-Apr-2024
25-Jan-2024
18-Mar-2024
20-May-2024
25-Mar-2024
01-Apr-2024
25-Jan-2024
08-Feb-2024
07-Mar-2024
08-Feb-2024
25-Jan-2024
02-May-2024
25-Apr-2024
29-Feb-2024
06-May-2024
14-Feb-2024
21-Feb-2024
07-Mar-2024
25-Mar-2024
08-Feb-2024
29-Feb-2024
14-Feb-2024
18-Mar-2024
02-May-2024
06-May-2024
07-Mar-2024
14-May-2024
09-Apr-2024
14-May-2024
20-May-2024
25-Mar-2024
29-Feb-2024
01-Apr-2024
06-May-2024
14-Mar-2024
14-Mar-2024
01-Apr-2024
01-Feb-2024
06-May-2024
25-Mar-2024
01-Apr-2024
01-Feb-2024
09-Apr-2024
06-May-2024
15-Apr-2024
06-May-2024
15-Apr-2024

## 2024-05-17 NOTE — BH INPATIENT PSYCHIATRY PROGRESS NOTE - NSTXCOPEDATEEST_PSY_ALL_CORE
06-May-2024
13-May-2024
15-Apr-2024
18-Mar-2024
11-Mar-2024
14-Feb-2024
18-Mar-2024
31-Jan-2024
13-May-2024
18-Mar-2024
29-Apr-2024
06-Mar-2024
15-Apr-2024
06-May-2024
14-Feb-2024
07-Feb-2024
23-Mar-2024
31-Jan-2024
22-Apr-2024
27-Feb-2024
11-Mar-2024
27-Feb-2024
18-Mar-2024
11-Mar-2024
23-Mar-2024
25-Jan-2024
29-Apr-2024
06-May-2024
07-Feb-2024
25-Jan-2024
06-May-2024
18-Mar-2024
06-May-2024
11-Mar-2024
13-May-2024
29-Apr-2024
15-Apr-2024
18-Mar-2024
31-Jan-2024
08-Apr-2024
22-Apr-2024
18-Mar-2024
18-Mar-2024
21-Feb-2024
06-Mar-2024
29-Apr-2024
29-Apr-2024
27-Feb-2024
31-Jan-2024
31-Jan-2024
25-Jan-2024
27-Feb-2024
29-Apr-2024
06-Mar-2024
14-Feb-2024
21-Feb-2024
22-Apr-2024
18-Mar-2024
21-Feb-2024
01-Apr-2024
22-Apr-2024
15-Apr-2024
04-Mar-2024
04-Mar-2024
21-Feb-2024
01-Apr-2024
18-Mar-2024
22-Apr-2024
04-Mar-2024
07-Feb-2024
25-Jan-2024
07-Feb-2024
11-Apr-2024
27-Feb-2024
31-Jan-2024
11-Mar-2024
13-May-2024
18-Apr-2024
22-Apr-2024
01-Apr-2024
08-Apr-2024
13-May-2024
31-Jan-2024
07-Feb-2024
25-Jan-2024
07-Feb-2024
21-Feb-2024
06-May-2024
09-Apr-2024
14-Feb-2024
21-Feb-2024
01-Apr-2024
25-Jan-2024
29-Apr-2024
06-Mar-2024
06-May-2024
18-Mar-2024
18-Mar-2024
22-Apr-2024
01-Apr-2024

## 2024-05-17 NOTE — [facility] INPATIENT PSYCHIATRY PROGRESS NOTE - NSTXIMPULSDATEEST_PSY_ALL_CORE
07-Feb-2024
18-Apr-2024
24-Jan-2024
07-Feb-2024
07-Feb-2024
13-Apr-2024
24-Jan-2024
18-Apr-2024
18-Apr-2024
07-Feb-2024
13-Apr-2024
18-Apr-2024
18-Apr-2024
24-Jan-2024
07-Feb-2024
07-Feb-2024
17-Jan-2024
18-Apr-2024
07-Feb-2024
18-Apr-2024
07-Feb-2024
18-Apr-2024
07-Feb-2024
18-Apr-2024
24-Jan-2024
24-Jan-2024
07-Feb-2024
07-Feb-2024
18-Apr-2024
17-Jan-2024
07-Feb-2024
24-Jan-2024
24-Jan-2024
17-Jan-2024
13-Apr-2024
18-Apr-2024
07-Feb-2024
17-Jan-2024
18-Apr-2024
07-Feb-2024
18-Apr-2024
17-Jan-2024
18-Apr-2024
07-Feb-2024
18-Apr-2024
24-Jan-2024
07-Feb-2024
18-Apr-2024
24-Jan-2024
24-Jan-2024
07-Feb-2024
07-Feb-2024
24-Jan-2024
07-Feb-2024
24-Jan-2024
09-Apr-2024
18-Apr-2024
07-Feb-2024
08-Apr-2024
07-Feb-2024
18-Apr-2024
07-Feb-2024
07-Feb-2024
18-Apr-2024
24-Jan-2024
17-Jan-2024
18-Apr-2024
07-Feb-2024
18-Apr-2024
07-Feb-2024
11-Apr-2024
24-Jan-2024
07-Feb-2024
18-Apr-2024
18-Apr-2024
07-Feb-2024
07-Feb-2024
18-Apr-2024
13-Apr-2024
18-Apr-2024
07-Feb-2024
18-Apr-2024
18-Apr-2024
07-Feb-2024

## 2024-05-17 NOTE — [facility] INPATIENT PSYCHIATRY PROGRESS NOTE - NSTXPROBVIOLNT_PSY_ALL_CORE
VIOLENT/AGGRESSIVE BEHAVIOR

## 2024-05-17 NOTE — [facility] INPATIENT PSYCHIATRY PROGRESS NOTE - NSTXDCOTHRDATEEST_PSY_ALL_CORE
07-Mar-2024
07-May-2024
07-Feb-2024
25-Apr-2024
01-Feb-2024
07-Feb-2024
11-Mar-2024
08-Apr-2024
08-Apr-2024
14-Feb-2024
29-Feb-2024
29-Feb-2024
18-Jan-2024
01-Feb-2024
25-Jan-2024
08-Apr-2024
01-Feb-2024
02-Apr-2024
18-Apr-2024
18-Jan-2024
25-Mar-2024
22-Feb-2024
22-Feb-2024
13-May-2024
18-Mar-2024
29-Apr-2024
18-Mar-2024
11-Mar-2024
13-May-2024
29-Apr-2024
29-Apr-2024
29-Feb-2024
18-Mar-2024
25-Mar-2024
25-Jan-2024
07-Feb-2024
18-Jan-2024
02-Apr-2024
07-May-2024
08-Apr-2024
29-Feb-2024
29-Feb-2024
11-Mar-2024
18-Jan-2024
07-May-2024
29-Apr-2024
01-Feb-2024
02-Apr-2024
07-Mar-2024
18-Jan-2024
29-Apr-2024
07-Feb-2024
14-Feb-2024
02-Apr-2024
07-Feb-2024
14-Feb-2024
18-Jan-2024
25-Mar-2024
29-Feb-2024
13-May-2024
18-Apr-2024
18-Jan-2024
29-Feb-2024
18-Apr-2024
18-Mar-2024
22-Feb-2024
08-Apr-2024
01-Feb-2024
11-Mar-2024
14-Feb-2024
29-Apr-2024
07-May-2024
13-May-2024
18-Apr-2024
01-Feb-2024
08-Apr-2024
18-Jan-2024
18-Mar-2024
29-Apr-2024
07-Feb-2024
14-Feb-2024
18-Apr-2024
22-Feb-2024
25-Mar-2024
22-Feb-2024
25-Apr-2024
25-Jan-2024
25-Mar-2024
07-Mar-2024
18-Jan-2024
29-Apr-2024
18-Jan-2024
25-Apr-2024
25-Mar-2024
22-Feb-2024
25-Apr-2024
11-Mar-2024
25-Mar-2024
01-Feb-2024
18-Mar-2024
29-Apr-2024
08-Apr-2024
29-Apr-2024
25-Mar-2024
18-Mar-2024
18-Mar-2024

## 2024-05-17 NOTE — BH INPATIENT PSYCHIATRY PROGRESS NOTE - NSBHFUPINTERVALHXFT_PSY_A_CORE
Patient took all meds overnight but refused this morning. Received prolixin 5mg IM in place of abilify. Also refused vitals which also had to be done as court order. Vitals noted to be stable, no significant elevation of BP. Patient did hit and spat at staff. Patient remains delusional - stating she has a baby. She is hunched over voluntarily stating a "ball" of stool is sitting in her stomach and weighing her down. States she talks to Rebeca Duran.

## 2024-05-17 NOTE — BH INPATIENT PSYCHIATRY PROGRESS NOTE - NSTREATMENTCERTY_PSY_ALL_CORE
That inpatient services furnished since the previous certification or recertification were, and continue to be required: for treatment that could reasonably be expected to improve the patient's condition; or, for diagnostic study;    That the hospital records show that the services furnished were: intensive treatment services; admission and related services necessary for diagnostic study or equivalent services;    That the patient continues to need, on a daily basis active inpatient treatment furnished directly by or requiring the supervision of inpatient psychiatric facility personnel.

## 2024-05-17 NOTE — BH INPATIENT PSYCHIATRY PROGRESS NOTE - NSTXFALLGOAL_PSY_ALL_CORE
Use non-slip footwear when out of bed
Call for assistance before getting out of bed or chair
Use non-slip footwear when out of bed
Call for assistance before getting out of bed or chair
Use non-slip footwear when out of bed
Call for assistance before getting out of bed or chair
Use non-slip footwear when out of bed
Call for assistance before getting out of bed or chair
Use non-slip footwear when out of bed
Call for assistance before getting out of bed or chair

## 2024-05-17 NOTE — BH INPATIENT PSYCHIATRY PROGRESS NOTE - NSBHROSSYSTEMS_PSY_ALL_CORE
Psychiatric

## 2024-05-17 NOTE — BH CHART NOTE - NSEVENTNOTEFT_PSY_ALL_CORE
[facility] Inpatient to ED Transfer Summary    Reason for Transfer/Medical Summary: AMS, swollen lip      PAST MEDICAL & SURGICAL HISTORY:  Parkinson disease      Seizure disorder      Hypothyroidism      Type II diabetes mellitus      Hyperlipidemia      Schizophrenia      Hemorrhoids      Hypertension      No significant past surgical history          Allergies    penicillin (Hives; Rash)    Intolerances        MEDICATIONS  (STANDING):  ammonium lactate 12% Lotion 1 Application(s) Topical two times a day  ARIPiprazole  Solution 7.5 milliGRAM(s) Oral at bedtime  ARIPiprazole  Solution 5 milliGRAM(s) Oral daily  benztropine 1 milliGRAM(s) Oral two times a day  carbidopa/levodopa  25/100 1.5 Tablet(s) Oral <User Schedule>  cholecalciferol 400 Unit(s) Oral daily  divalproex Sprinkle 750 milliGRAM(s) Oral two times a day  glucagon  Injectable 1 milliGRAM(s) IntraMuscular once  hemorrhoidal Ointment 1 Application(s) Rectal daily  hydrocortisone 2.5% Ointment 1 Application(s) Topical two times a day  levothyroxine 75 MICROGram(s) Oral daily  lisinopril 20 milliGRAM(s) Oral daily  metFORMIN 500 milliGRAM(s) Oral two times a day  metoprolol succinate ER 50 milliGRAM(s) Oral daily  multivitamin 1 Tablet(s) Oral at bedtime  polyethylene glycol 3350 17 Gram(s) Oral daily  senna 2 Tablet(s) Oral at bedtime    MEDICATIONS  (PRN):  acetaminophen     Tablet .. 650 milliGRAM(s) Oral every 6 hours PRN Mild Pain (1 - 3), Moderate Pain (4 - 6)  albuterol    90 MICROgram(s) HFA Inhaler 2 Puff(s) Inhalation every 6 hours PRN Shortness of Breath and/or Wheezing  aluminum hydroxide/magnesium hydroxide/simethicone Suspension 30 milliLiter(s) Oral every 6 hours PRN Dyspepsia  bisacodyl Suppository 10 milliGRAM(s) Rectal daily PRN constipation  dextrose Oral Gel 15 Gram(s) Oral once PRN Blood Glucose LESS THAN 70 milliGRAM(s)/deciliter  diphenhydrAMINE Injectable 25 milliGRAM(s) IntraMuscular once PRN agitation  fluPHENAZine 2.5 milliGRAM(s) Oral every 6 hours PRN agitation  fluPHENAZine  Injectable 5 milliGRAM(s) IntraMuscular once PRN agitation  fluPHENAZine  Injectable 5 milliGRAM(s) IntraMuscular two times a day PRN refusal of Abilify as per court order  simethicone 80 milliGRAM(s) Chew every 8 hours PRN gas  sodium chloride 0.65% Nasal 2 Spray(s) Both Nostrils every 6 hours PRN congestion      Vital Signs Last 24 Hrs  T(C): --  T(F): --  HR: --  BP: --  BP(mean): --  RR: --  SpO2: --      CAPILLARY BLOOD GLUCOSE      POCT Blood Glucose.: 134 mg/dL (17 May 2024 07:57)      PHYSICAL EXAM:  GENERAL: NAD but confused and drooling  HEAD:  Atraumatic, Normocephalic, head leaning forward  EYES: EOMI, PERRLA, conjunctiva and sclera clear  CHEST/LUNG: normal WOB, Clear to auscultation bilaterally although limited by patient vocalizations  HEART: Regular rate and rhythm; No murmurs, rubs, or gallops  EXTREMITIES:  2+ Peripheral Pulses, No clubbing, cyanosis, or edema  PSYCH: oriented to self  SKIN: lower lip swollen to size of thumb    LABS:                    Psychiatry Section:  Psychiatric Summary/[facility] admitting diagnosis: schizoaffective disorder    74 yo F, domiciled at [facility] (located at [address] [place] [address]) for past 8 years, with psychiatric hx of Schizophrenia, Vascular dementia with behavioral disturbance; multiple inpatient psych admissions (x2 in 2023 per [facility], [facility] in Nov 2021); followed by outpt psych NP Wanda Aguillon (prescribes seroquel 50 mg PO TID, klonopin 0.5 mg BID (taking for several months), depakote 500 mg + 750 mg (prescribed for seizures). ; no known suicidality/SIB; h/o threatening violence towards staff;  PMH of seizure disorder, Parkinson's disease, HTN, T2DM, asthma, hypothyroidism, retinopathy with macular edema, GERD, h/o malignant neoplasm of bladder.         Psychiatric Recommendations:  - Patient is NOT psychiatrically cleared and will return to [facility] when medically cleared    Observation status (check one):   (x) Constant Observation  ( ) Enhanced care  ( ) Routine checks    Risk Status (check all that apply if present):  ( ) at risk for suicide/self-injury  (x ) at risk for aggressive behavior  ( ) at risk for elopement  ( ) other risk:

## 2024-05-17 NOTE — ED ADULT TRIAGE NOTE - CHIEF COMPLAINT QUOTE
patient is coming from [address] with swelling of her lower lip" h/o HTN patient is coming from [address] with swelling of her lower lip" h/o HTN. patient is on lisinopril for HTN

## 2024-05-17 NOTE — ED ADULT NURSE REASSESSMENT NOTE - NS ED NURSE REASSESS COMMENT FT1
IV attempted multiple times with 2 RN, Patient pulls arm away, MD informed will try ultrasound guided IV, blood work was able to be drawn and sent to lab. Awaiting for IV insertion.

## 2024-05-17 NOTE — BH INPATIENT PSYCHIATRY PROGRESS NOTE - NSTXPROBCONF_PSY_ALL_CORE
CONFUSION, ACUTE/CHRONIC

## 2024-05-17 NOTE — ED PROVIDER NOTE - OBJECTIVE STATEMENT
73-year-old female history of vascular dementia, Parkinson's, retinopathy with macular edema, asthma, seizures, hypothyroidism, HLD, GERD, MDD, MI, malignant neoplasm of the bladder, anxiety, hydrocephalus, TERRY, schizophrenia, HTN presenting to the ED for swelling of the lip.  The patient reports that her symptoms began a few hours ago.  On examination the patient is in and out of sleep.  The rest of the history is obtained from the aide that is bedside.  Per the aide when she got to work this afternoon she was approached by the patient and ask if her lips were swelling.  At that time they noticed that her lips were swollen.  The patient states that there is pain associated with it.  ROS is limited secondary to mental status on physical exam.

## 2024-05-17 NOTE — BH INPATIENT PSYCHIATRY PROGRESS NOTE - NSTXDIABINTERMD_PSY_ALL_CORE
education

## 2024-05-17 NOTE — BH INPATIENT PSYCHIATRY PROGRESS NOTE - NSDCCRITERIA_PSY_ALL_CORE
CGI < 3 

## 2024-05-17 NOTE — BH INPATIENT PSYCHIATRY PROGRESS NOTE - NSBHFUPINTERVALCCFT_PSY_A_CORE
Patient reports no further GI upset, denies dizziness
Patient uncooperative with ROS 
Uncooperative with ROS, focuased on vitalmins
Patient denies dizziness, constipation
"You are a no good doctor, look at my posture!"
"go away". 
My hands are shaking
"I am busy"
Patient denies constipation, sedation
Uncooperative with ROS. Denies pain
Patient reports constipation, no dizziness
Patient unreliable in reporting complaints, uncooperative with ROS
psychosis
Patient uncooperative with ROS
Psychosis
Uncooeprative with ROS
Patient uncooperative with ROS 
Patient denies pain, SOB
"nervous". 
Patient followed fo SAD, no complaints today
psychosis
Patient denies dizziness, constipation
My right knee hurts
Patient denies dizziness, constipation
Patient uncooperative with ROS 
Patient denies dizziness, constipation
The pt. stated that she is okay and offered no complaints.
Patient uncooperative with ROS
Patient unreliable in reporting complaints, uncooperative with ROS
Uncooperative with ROS, focussed on vitamins, diet
Uncooperative with ROS. Denies pain
Patient denies dizziness, constipation
Patient unreliable in reporting complaints, uncooperative with ROS
Patient reports no further GI upset, denies dizziness
Patient denies pain, SOB, focused on getting a walker
Uncooperative with ROS, focusased on vitamins, diet
Uncooperative with ROS. Denies pain
reported feeling "not too great". 
Patient reports no further GI upset, denies dizziness
Psychosis
Patient denies dizziness, constipation
Patient denies dizziness, constipation
Patient uncooperative with ROS
Patient uncooperative with ROS 
Patient unreliable in reporting complaints, uncooperative with ROS
Patient reports no further GI upset, denies dizziness
Patient uncooperative with ROS 
"Where's dinner?"
Patient unreliable in reporting complaints, uncooperative with ROS
Patient uncooperative with ROS 
psychosis
Patient reports feeling gassy
Patient reports no further GI upset, denies dizziness, denies bleeding from prolapse
Patient denies dizziness, constipation
irritable
Uncooperative with ROS.Denies pain
Patient reports no further GI upset, denies dizziness
Patient uncooperative with ROS 
Patient uncooperative with ROS 
My hands are shaking
Patient denies dizziness, constipation
Uncooperative with ROS, focuased on vitamins, diet
Uncooperative with ROS. Denies pain
agitation
Patient uncooperative with ROS
Patient denies dizziness, constiaption
Patient unreliable in reporting complaints, uncooperative with ROS
Patient uncooperative with ROS 
Patient unreliable in reporting complaints, doing ROS
Patient continues to focus on diet issues denies dizziness, HA
Patient reports no further GI upset, denies dizziness
Patient unreliable in reporting complaints, doing ROS
'I don't feel good'
Patient denies dizziness, constipation
Patient denies pain, SOB, focused on getting a walker
Patient reports no further GI upset, denies dizziness
Patient uncooperative with ROS 
Patient reports no further GI upset, denies dizziness
psychosis
Uncooperative with ROS.Denies pain
Uncooperative with ROS. Denies pain
Patient unreliable in reporting complaints, uncooperative with ROS
My nose runs all the time I have pleurisy
Patient denies constipation today but was not that cooperative with ROS
Patient denies dizziness, constipation
Psychosis
Patient unreliable in reporting complaints, uncooperative with ROS
Psychosis
My hands are shaking
Patient reports no further GI upset, denies dizziness
Patient unreliable in reporting complaints, doing ROS
The pt. stated that she is well and offered no complaints.
Patient denies dizziness, constipation
Uncooperative with ROS.
Uncooperative with ROS. Denies pain
Patient denies constipation, sedation
Psychosis
Uncooperative with ROS. Denies pain
psychosis
Uncooperative with ROS.
Uncooperative with ROS. Denies pain
Psychosis
Patient unreliable in reporting complaints, doing ROS
psychosis
Patient uncooperative with ROS
Patient uncooperative with ROS 
Uncooperative with ROS.Denies pain realted to falls

## 2024-05-17 NOTE — BH INPATIENT PSYCHIATRY PROGRESS NOTE - NSBHMETABOLIC_PSY_ALL_CORE_FT
BMI: BMI (kg/m2): 30.2 (01-15-24 @ 12:52)  HbA1c: A1C with Estimated Average Glucose Result: 7.6 % (01-12-24 @ 12:40)    Glucose: POCT Blood Glucose.: 134 mg/dL (05-17-24 @ 07:57)    BP: --Vital Signs Last 24 Hrs  T(C): --  T(F): --  HR: --  BP: --  BP(mean): --  RR: --  SpO2: --    Orthostatic VS  05-17-24 @ 07:43  Lying BP: --/-- HR: --  Sitting BP: 152/72 HR: 90  Standing BP: 142/72 HR: 92  Site: --  Mode: --  Orthostatic VS  05-16-24 @ 10:12  Lying BP: --/-- HR: --  Sitting BP: 164/71 HR: 88  Standing BP: 156/74 HR: 92  Site: upper left arm  Mode: electronic    Lipid Panel:

## 2024-05-17 NOTE — BH INPATIENT PSYCHIATRY PROGRESS NOTE - CURRENT MEDICATION
MEDICATIONS  (STANDING):  ammonium lactate 12% Lotion 1 Application(s) Topical two times a day  ARIPiprazole  Solution 7.5 milliGRAM(s) Oral at bedtime  ARIPiprazole  Solution 5 milliGRAM(s) Oral daily  benztropine 1 milliGRAM(s) Oral two times a day  carbidopa/levodopa  25/100 1.5 Tablet(s) Oral <User Schedule>  cholecalciferol 400 Unit(s) Oral daily  divalproex Sprinkle 750 milliGRAM(s) Oral two times a day  glucagon  Injectable 1 milliGRAM(s) IntraMuscular once  hemorrhoidal Ointment 1 Application(s) Rectal daily  hydrocortisone 2.5% Ointment 1 Application(s) Topical two times a day  levothyroxine 75 MICROGram(s) Oral daily  lisinopril 20 milliGRAM(s) Oral daily  metFORMIN 500 milliGRAM(s) Oral two times a day  metoprolol succinate ER 50 milliGRAM(s) Oral daily  multivitamin 1 Tablet(s) Oral at bedtime  polyethylene glycol 3350 17 Gram(s) Oral daily  senna 2 Tablet(s) Oral at bedtime    MEDICATIONS  (PRN):  acetaminophen     Tablet .. 650 milliGRAM(s) Oral every 6 hours PRN Mild Pain (1 - 3), Moderate Pain (4 - 6)  albuterol    90 MICROgram(s) HFA Inhaler 2 Puff(s) Inhalation every 6 hours PRN Shortness of Breath and/or Wheezing  aluminum hydroxide/magnesium hydroxide/simethicone Suspension 30 milliLiter(s) Oral every 6 hours PRN Dyspepsia  bisacodyl Suppository 10 milliGRAM(s) Rectal daily PRN constipation  dextrose Oral Gel 15 Gram(s) Oral once PRN Blood Glucose LESS THAN 70 milliGRAM(s)/deciliter  diphenhydrAMINE Injectable 25 milliGRAM(s) IntraMuscular once PRN agitation  fluPHENAZine 2.5 milliGRAM(s) Oral every 6 hours PRN agitation  fluPHENAZine  Injectable 5 milliGRAM(s) IntraMuscular once PRN agitation  fluPHENAZine  Injectable 5 milliGRAM(s) IntraMuscular two times a day PRN refusal of Abilify as per court order  simethicone 80 milliGRAM(s) Chew every 8 hours PRN gas  sodium chloride 0.65% Nasal 2 Spray(s) Both Nostrils every 6 hours PRN congestion

## 2024-05-17 NOTE — BH INPATIENT PSYCHIATRY PROGRESS NOTE - NSTXVIOLNTDATETRGT_PSY_ALL_CORE
25-Apr-2024
25-Apr-2024
13-Mar-2024
25-Jan-2024
13-Mar-2024
25-Apr-2024
25-Apr-2024
25-Jan-2024
18-Apr-2024
23-Mar-2024
25-Jan-2024
23-Mar-2024
25-Apr-2024
25-Jan-2024
13-Mar-2024
13-Mar-2024
20-Feb-2024
20-Feb-2024
23-Mar-2024
25-Apr-2024
25-Apr-2024
20-Apr-2024
20-Feb-2024
25-Apr-2024
13-Mar-2024
23-Mar-2024
25-Jan-2024
25-Apr-2024
13-Mar-2024
20-Feb-2024
24-Jan-2024
25-Jan-2024
25-Jan-2024
20-Feb-2024
13-Mar-2024
20-Feb-2024
24-Jan-2024
25-Apr-2024
25-Jan-2024
23-Mar-2024
23-Mar-2024
24-Jan-2024
13-Mar-2024
20-Feb-2024
24-Jan-2024
25-Jan-2024
23-Mar-2024
25-Apr-2024
25-Jan-2024
25-Jan-2024
23-Mar-2024
13-Mar-2024
25-Apr-2024
23-Mar-2024
25-Jan-2024
20-Apr-2024
20-Feb-2024
23-Mar-2024
24-Jan-2024
25-Jan-2024
20-Apr-2024
25-Jan-2024
20-Feb-2024
25-Jan-2024
23-Mar-2024
25-Apr-2024
20-Feb-2024
23-Mar-2024
25-Apr-2024
25-Jan-2024
23-Mar-2024
25-Apr-2024
25-Jan-2024
23-Mar-2024
25-Apr-2024
20-Feb-2024
25-Jan-2024
23-Mar-2024
25-Apr-2024
25-Apr-2024
20-Feb-2024
23-Mar-2024
25-Apr-2024
20-Feb-2024
25-Apr-2024
20-Feb-2024
25-Apr-2024
23-Mar-2024
25-Apr-2024
20-Apr-2024
25-Apr-2024
25-Apr-2024
24-Jan-2024
23-Mar-2024

## 2024-05-17 NOTE — BH INPATIENT PSYCHIATRY PROGRESS NOTE - NSTXCONFDATEEST_PSY_ALL_CORE
06-Mar-2024
18-Apr-2024
06-Mar-2024
13-Feb-2024
13-Feb-2024
18-Apr-2024
16-Mar-2024
24-Jan-2024
18-Apr-2024
18-Apr-2024
13-Feb-2024
18-Jan-2024
06-Mar-2024
07-Feb-2024
13-Feb-2024
16-Mar-2024
18-Apr-2024
24-Jan-2024
24-Jan-2024
11-Apr-2024
18-Jan-2024
24-Jan-2024
06-Mar-2024
09-Apr-2024
16-Mar-2024
24-Jan-2024
06-Mar-2024
07-Feb-2024
16-Mar-2024
24-Jan-2024
16-Mar-2024
18-Apr-2024
18-Jan-2024
13-Feb-2024
18-Apr-2024
18-Jan-2024
18-Apr-2024
24-Jan-2024
24-Jan-2024
16-Mar-2024
07-Feb-2024
13-Apr-2024
13-Feb-2024
18-Apr-2024
13-Feb-2024
18-Apr-2024
18-Apr-2024
13-Feb-2024
18-Apr-2024
18-Apr-2024
24-Jan-2024
24-Jan-2024
16-Mar-2024
18-Apr-2024
16-Mar-2024
13-Feb-2024
18-Apr-2024
07-Feb-2024
07-Feb-2024
17-Apr-2024
18-Apr-2024
07-Feb-2024
13-Feb-2024
16-Mar-2024
18-Jan-2024
06-Mar-2024
13-Apr-2024
13-Feb-2024
18-Apr-2024
13-Feb-2024
24-Jan-2024
06-Mar-2024
18-Apr-2024
24-Jan-2024
16-Mar-2024
13-Feb-2024
18-Apr-2024
13-Feb-2024
16-Mar-2024
13-Apr-2024
18-Apr-2024
24-Jan-2024
16-Mar-2024
16-Mar-2024
18-Apr-2024
18-Apr-2024
24-Jan-2024
13-Feb-2024
18-Apr-2024
06-Mar-2024
18-Apr-2024
16-Mar-2024
13-Feb-2024
13-Feb-2024
16-Mar-2024
13-Feb-2024
06-Mar-2024
16-Mar-2024
18-Apr-2024
16-Mar-2024

## 2024-05-17 NOTE — BH INPATIENT PSYCHIATRY PROGRESS NOTE - NSTXDCOTHRGOAL_PSY_ALL_CORE
The pt will be discharged back to [facility] for LTC and will have onsite psychiatric f/u.
The pt will be discharged to an SNF for LTC.
The pt will be discharged back to [facility] for her LTC.
The pt will either be discharged to an SNF for LTC or transferred to a state hospital depending upon her response to treatment and behavioral control.
The pt will be discharged back to [facility] for LTC and will have onsite psychiatric f/u.
The pt will either be discharged to an SNF for LTC or transferred to a [facility] depending for long term inpatiet treatment upon her response to treatment and behavioral control.
The pt will be discharged back to [facility] for LTC and will have onsite psychiatric f/u.
The pt will be discharged back to [facility] for LTC.
The pt will be discharged back to [facility] for LTC and will have onsite psychiatric f/u.
The pt will be discharged to an SNF for LTC.
The pt will be discharged to an SNF for LTC.
The pt will either be discharged to an SNF for LTC or transferred to a [facility] depending for long term inpatiet treatment upon her response to treatment and behavioral control.
The pt will be discharged to an SNF for LTC.
The pt will either be discharged to an SNF for LTC or transferred to a [facility] depending for long term inpatiet treatment upon her response to treatment and behavioral control.
The pt will be discharged back to [facility] for LTC.
The pt will be discharged back to [facility] for LTC and will have onsite psychiatric f/u.
The pt will be discharged to an SNF for LTC.
The pt will either be discharged to an SNF for LTC or transferred to a [facility] depending for long term inpatiet treatment upon her response to treatment and behavioral control.
The pt will either be discharged to an SNF for LTC or transferred to a [facility] depending for long term inpatiet treatment upon her response to treatment and behavioral control.
The pt will either be discharged to an SNF for LTC or transferred to a state hospital depending upon her response to treatment and behavioral control.
The pt will be discharged back to [facility] for LTC.
The pt will be discharged back to [facility] for LTC and will have onsite psychiatric f/u.
The pt will be discharged back to [facility] for LTC.
The pt will either be discharged to an SNF for LTC or transferred to a state hospital for long term inpatient hospitalization depending upon her treatment response.
The pt will be discharged to an SNF for LTC.
The pt will be discharged to an SNF for LTC.
The pt will be discharged back to [facility] for LTC.
The pt will be discharged back to [facility] for LTC.
The pt will be discharged to an SNF for LTC.
The pt will be discharged back to [facility] for LTC and will have onsite psychiatric f/u.
The pt will be discharged to an SNF for LTC.
The pt will be discharged back to [facility] for LTC.
The pt will be discharged back to [facility] for LTC.
The pt will be discharged back to [facility] for LTC.
The pt will either be discharged to an SNF for LTC or transferred to a state hospital for long term inpatient hospitalization depending upon her treatment response.
The pt will be discharged back to [facility] for her LTC.
The pt will either be discharged to an SNF for LTC or transferred to a [facility] depending for long term inpatiet treatment upon her response to treatment and behavioral control.
The pt will be discharged back to [facility] for LTC.
The pt will be discharged to an SNF for LTC.
The pt will be discharged to an SNF for LTC.
The pt will either be discharged to an SNF for LTC or transferred to a [facility] depending for long term inpatiet treatment upon her response to treatment and behavioral control.
The pt will be discharged back to [facility] for LTC.
The pt will be discharged to an SNF for LTC.
The pt will be discharged back to [facility] for LTC and will have onsite psychiatric f/u.
The pt will either be discharged to an SNF for LTC or transferred to a state hospital depending upon her response to treatment and behavioral control.
The pt will be discharged back to [facility] for LTC.
The pt will be discharged back to [facility] for LTC.
The pt will be discharged back to [facility] for LTC.
The pt will be discharged to an SNF for LTC.
The pt will be discharged to an SNF for LTC.
The pt will be discharged back to [facility] for LTC.
The pt will be discharged back to [facility] for LTC and will have onsite psychiatric f/u.
The pt will either be discharged to an SNF for LTC or transferred to a state hospital depending upon her response to treatment and behavioral control.
The pt will be discharged back to [facility] for LTC.
The pt will be discharged back to [facility] for LTC and will have onsite psychiatric f/u.
The pt will be discharged to an SNF for LTC.
The pt will either be discharged to an SNF for LTC or transferred to a state hospital for long term inpatient treatment depending upon her response to treatment.
The pt will either be discharged to an SNF for LTC or transferred to a state hospital for long term inpatient treatment depending upon her response to treatment.
The pt will either be discharged to an SNF for LTC or transferred to a [facility] depending for long term inpatiet treatment upon her response to treatment and behavioral control.
The pt will be discharged to an SNF for LTC.
The pt will be discharged back to [facility] for LTC and will have onsite psychiatric f/u.
The pt will either be discharged to an SNF for LTC or transferred to a [facility] depending for long term inpatiet treatment upon her response to treatment and behavioral control.
The pt will be discharged back to [facility] for LTC and will have onsite psychiatric f/u.
The pt will be discharged back to [facility] for LTC.
The pt will be discharged back to [facility] for LTC and will have onsite psychiatric f/u.
The pt will be discharged back to [facility] for LTC and will have onsite psychiatric f/u.
The pt will be discharged to an SNF for LTC.
The pt will be discharged to an SNF for LTC.
The pt will be discharged back to [facility] for LTC.
The pt will be discharged back to [facility] for LTC.
The pt will be discharged back to [facility] for LTC.
The pt will be discharged back to [facility] for LTC.
The pt will be discharged to an SNF for LTC.
The pt will either be discharged to an SNF for LTC or transferred to a state hospital for long term inpatient hospitalization depending upon her treatment response.
The pt will be discharged back to [facility] for LTC.
The pt will be discharged back to [facility] for her LTC.
The pt will be discharged back to [facility] for LTC and will have onsite psychiatric f/u.
The pt will be discharged to an SNF for LTC.
The pt will either be discharged to an SNF for LTC or transferred to a [facility] depending for long term inpatiet treatment upon her response to treatment and behavioral control.
The pt will either be discharged to an SNF for LTC or transferred to a state hospital for long term inpatient treatment depending upon her response to treatment.
The pt will be discharged to an SNF for LTC.
The pt will be discharged to an SNF for LTC.
The pt will be discharged back to [facility] for LTC.
The pt will be discharged back to [facility] for LTC and will have onsite psychiatric f/u.
The pt will be discharged to an SNF for LTC.
The pt will be discharged to an SNF for LTC.
The pt will be discharged back to [facility] for LTC and will have onsite psychiatric f/u.
The pt will be discharged to an SNF for LTC.
The pt will either be discharged to an SNF for LTC or transferred to a state hospital for long term inpatient treatment depending upon her response to treatment.
The pt will be discharged back to [facility] for LTC.
The pt will be discharged back to [facility] for LTC and will have onsite psychiatric f/u.
The pt will be discharged back to [facility] for LTC.
The pt will be discharged to an SNF for LTC.
The pt will be discharged back to [facility] for LTC.
The pt will be discharged to an SNF for LTC.
The pt will either be discharged to an SNF for LTC or transferred to a state hospital for long term inpatient hospitalization depending upon her treatment response.
The pt will be discharged to an SNF for LTC.
The pt will be discharged to an SNF for LTC.

## 2024-05-17 NOTE — BH INPATIENT PSYCHIATRY PROGRESS NOTE - NS ED BHA MED ROS PSYCHIATRIC
See HPI

## 2024-05-17 NOTE — BH INPATIENT PSYCHIATRY PROGRESS NOTE - NSTXCOPEGOAL_PSY_ALL_CORE
Identify and utilize 2 coping skills that meet their needs

## 2024-05-17 NOTE — BH INPATIENT PSYCHIATRY PROGRESS NOTE - MSE OPTIONS
Unstructured MSE
Structured MSE
Unstructured MSE
Structured MSE
Unstructured MSE
Structured MSE
Structured MSE

## 2024-05-17 NOTE — BH INPATIENT PSYCHIATRY PROGRESS NOTE - NSTXIMPULSDATETRGT_PSY_ALL_CORE
14-Feb-2024
25-Apr-2024
14-Feb-2024
25-Apr-2024
25-Jan-2024
24-Jan-2024
25-Apr-2024
14-Feb-2024
25-Jan-2024
14-Feb-2024
25-Apr-2024
25-Apr-2024
14-Feb-2024
14-Feb-2024
25-Apr-2024
14-Feb-2024
14-Feb-2024
25-Apr-2024
14-Feb-2024
14-Feb-2024
24-Jan-2024
14-Feb-2024
25-Jan-2024
24-Jan-2024
25-Jan-2024
20-Apr-2024
25-Jan-2024
24-Jan-2024
14-Feb-2024
25-Apr-2024
14-Feb-2024
14-Feb-2024
25-Apr-2024
25-Jan-2024
14-Feb-2024
25-Apr-2024
25-Jan-2024
14-Feb-2024
24-Jan-2024
16-Apr-2024
25-Apr-2024
25-Jan-2024
14-Feb-2024
24-Jan-2024
25-Apr-2024
14-Feb-2024
25-Apr-2024
14-Feb-2024
25-Jan-2024
14-Feb-2024
25-Apr-2024
14-Feb-2024
14-Feb-2024
18-Apr-2024
25-Apr-2024
25-Apr-2024
20-Apr-2024
25-Apr-2024
25-Apr-2024
14-Feb-2024
25-Jan-2024
15-Apr-2024
25-Apr-2024
14-Feb-2024
25-Apr-2024
14-Feb-2024
25-Jan-2024
14-Feb-2024
25-Jan-2024
14-Feb-2024
25-Apr-2024
14-Feb-2024
25-Apr-2024
25-Apr-2024
14-Feb-2024
20-Apr-2024
25-Apr-2024
25-Apr-2024
20-Apr-2024
14-Feb-2024

## 2024-05-18 DIAGNOSIS — T78.3XXA ANGIONEUROTIC EDEMA, INITIAL ENCOUNTER: ICD-10-CM

## 2024-05-18 DIAGNOSIS — E03.9 HYPOTHYROIDISM, UNSPECIFIED: ICD-10-CM

## 2024-05-18 DIAGNOSIS — E11.9 TYPE 2 DIABETES MELLITUS WITHOUT COMPLICATIONS: ICD-10-CM

## 2024-05-18 DIAGNOSIS — F20.9 SCHIZOPHRENIA, UNSPECIFIED: ICD-10-CM

## 2024-05-18 DIAGNOSIS — G40.909 EPILEPSY, UNSPECIFIED, NOT INTRACTABLE, WITHOUT STATUS EPILEPTICUS: ICD-10-CM

## 2024-05-18 DIAGNOSIS — G20 PARKINSON'S DISEASE: ICD-10-CM

## 2024-05-18 DIAGNOSIS — F29 UNSPECIFIED PSYCHOSIS NOT DUE TO A SUBSTANCE OR KNOWN PHYSIOLOGICAL CONDITION: ICD-10-CM

## 2024-05-18 DIAGNOSIS — I10 ESSENTIAL (PRIMARY) HYPERTENSION: ICD-10-CM

## 2024-05-18 LAB
A1C WITH ESTIMATED AVERAGE GLUCOSE RESULT: 8.3 % — HIGH (ref 4–5.6)
ANION GAP SERPL CALC-SCNC: 14 MMOL/L — SIGNIFICANT CHANGE UP (ref 7–14)
BASOPHILS # BLD AUTO: 0.01 K/UL — SIGNIFICANT CHANGE UP (ref 0–0.2)
BASOPHILS NFR BLD AUTO: 0.1 % — SIGNIFICANT CHANGE UP (ref 0–2)
BUN SERPL-MCNC: 23 MG/DL — SIGNIFICANT CHANGE UP (ref 7–23)
CALCIUM SERPL-MCNC: 9.5 MG/DL — SIGNIFICANT CHANGE UP (ref 8.4–10.5)
CHLORIDE SERPL-SCNC: 105 MMOL/L — SIGNIFICANT CHANGE UP (ref 98–107)
CO2 SERPL-SCNC: 20 MMOL/L — LOW (ref 22–31)
CREAT SERPL-MCNC: 0.82 MG/DL — SIGNIFICANT CHANGE UP (ref 0.5–1.3)
EGFR: 76 ML/MIN/1.73M2 — SIGNIFICANT CHANGE UP
EOSINOPHIL # BLD AUTO: 0 K/UL — SIGNIFICANT CHANGE UP (ref 0–0.5)
EOSINOPHIL NFR BLD AUTO: 0 % — SIGNIFICANT CHANGE UP (ref 0–6)
ESTIMATED AVERAGE GLUCOSE: 192 — SIGNIFICANT CHANGE UP
GLUCOSE BLDC GLUCOMTR-MCNC: 112 MG/DL — HIGH (ref 70–99)
GLUCOSE BLDC GLUCOMTR-MCNC: 225 MG/DL — HIGH (ref 70–99)
GLUCOSE BLDC GLUCOMTR-MCNC: 229 MG/DL — HIGH (ref 70–99)
GLUCOSE BLDC GLUCOMTR-MCNC: 249 MG/DL — HIGH (ref 70–99)
GLUCOSE SERPL-MCNC: 226 MG/DL — HIGH (ref 70–99)
HCT VFR BLD CALC: 35.2 % — SIGNIFICANT CHANGE UP (ref 34.5–45)
HGB BLD-MCNC: 11.6 G/DL — SIGNIFICANT CHANGE UP (ref 11.5–15.5)
IANC: 5.84 K/UL — SIGNIFICANT CHANGE UP (ref 1.8–7.4)
IMM GRANULOCYTES NFR BLD AUTO: 0.4 % — SIGNIFICANT CHANGE UP (ref 0–0.9)
LYMPHOCYTES # BLD AUTO: 0.71 K/UL — LOW (ref 1–3.3)
LYMPHOCYTES # BLD AUTO: 10.6 % — LOW (ref 13–44)
MCHC RBC-ENTMCNC: 28.9 PG — SIGNIFICANT CHANGE UP (ref 27–34)
MCHC RBC-ENTMCNC: 33 GM/DL — SIGNIFICANT CHANGE UP (ref 32–36)
MCV RBC AUTO: 87.8 FL — SIGNIFICANT CHANGE UP (ref 80–100)
MONOCYTES # BLD AUTO: 0.13 K/UL — SIGNIFICANT CHANGE UP (ref 0–0.9)
MONOCYTES NFR BLD AUTO: 1.9 % — LOW (ref 2–14)
NEUTROPHILS # BLD AUTO: 5.84 K/UL — SIGNIFICANT CHANGE UP (ref 1.8–7.4)
NEUTROPHILS NFR BLD AUTO: 87 % — HIGH (ref 43–77)
NRBC # BLD: 0 /100 WBCS — SIGNIFICANT CHANGE UP (ref 0–0)
NRBC # FLD: 0 K/UL — SIGNIFICANT CHANGE UP (ref 0–0)
PLATELET # BLD AUTO: 237 K/UL — SIGNIFICANT CHANGE UP (ref 150–400)
POTASSIUM SERPL-MCNC: 4.7 MMOL/L — SIGNIFICANT CHANGE UP (ref 3.5–5.3)
POTASSIUM SERPL-SCNC: 4.7 MMOL/L — SIGNIFICANT CHANGE UP (ref 3.5–5.3)
RBC # BLD: 4.01 M/UL — SIGNIFICANT CHANGE UP (ref 3.8–5.2)
RBC # FLD: 13.7 % — SIGNIFICANT CHANGE UP (ref 10.3–14.5)
SODIUM SERPL-SCNC: 139 MMOL/L — SIGNIFICANT CHANGE UP (ref 135–145)
WBC # BLD: 6.72 K/UL — SIGNIFICANT CHANGE UP (ref 3.8–10.5)
WBC # FLD AUTO: 6.72 K/UL — SIGNIFICANT CHANGE UP (ref 3.8–10.5)

## 2024-05-18 PROCEDURE — 99223 1ST HOSP IP/OBS HIGH 75: CPT

## 2024-05-18 PROCEDURE — 90792 PSYCH DIAG EVAL W/MED SRVCS: CPT

## 2024-05-18 RX ORDER — INSULIN LISPRO 100/ML
VIAL (ML) SUBCUTANEOUS AT BEDTIME
Refills: 0 | Status: DISCONTINUED | OUTPATIENT
Start: 2024-05-18 | End: 2024-05-21

## 2024-05-18 RX ORDER — ACETAMINOPHEN 500 MG
650 TABLET ORAL EVERY 6 HOURS
Refills: 0 | Status: DISCONTINUED | OUTPATIENT
Start: 2024-05-18 | End: 2024-05-21

## 2024-05-18 RX ORDER — INSULIN LISPRO 100/ML
VIAL (ML) SUBCUTANEOUS
Refills: 0 | Status: DISCONTINUED | OUTPATIENT
Start: 2024-05-18 | End: 2024-05-21

## 2024-05-18 RX ORDER — SENNA PLUS 8.6 MG/1
2 TABLET ORAL AT BEDTIME
Refills: 0 | Status: DISCONTINUED | OUTPATIENT
Start: 2024-05-18 | End: 2024-05-21

## 2024-05-18 RX ORDER — DEXTROSE 50 % IN WATER 50 %
12.5 SYRINGE (ML) INTRAVENOUS ONCE
Refills: 0 | Status: DISCONTINUED | OUTPATIENT
Start: 2024-05-18 | End: 2024-05-21

## 2024-05-18 RX ORDER — LEVOTHYROXINE SODIUM 125 MCG
1 TABLET ORAL
Refills: 0 | DISCHARGE

## 2024-05-18 RX ORDER — FLUPHENAZINE HYDROCHLORIDE 1 MG/1
2.5 TABLET, FILM COATED ORAL EVERY 6 HOURS
Refills: 0 | Status: DISCONTINUED | OUTPATIENT
Start: 2024-05-18 | End: 2024-05-21

## 2024-05-18 RX ORDER — SOD,AMMONIUM,POTASSIUM LACTATE
1 CREAM (GRAM) TOPICAL
Refills: 0 | Status: DISCONTINUED | OUTPATIENT
Start: 2024-05-18 | End: 2024-05-21

## 2024-05-18 RX ORDER — ENOXAPARIN SODIUM 100 MG/ML
40 INJECTION SUBCUTANEOUS EVERY 24 HOURS
Refills: 0 | Status: DISCONTINUED | OUTPATIENT
Start: 2024-05-18 | End: 2024-05-21

## 2024-05-18 RX ORDER — BENZTROPINE MESYLATE 1 MG
1 TABLET ORAL
Refills: 0 | Status: DISCONTINUED | OUTPATIENT
Start: 2024-05-18 | End: 2024-05-21

## 2024-05-18 RX ORDER — LANOLIN ALCOHOL/MO/W.PET/CERES
3 CREAM (GRAM) TOPICAL AT BEDTIME
Refills: 0 | Status: DISCONTINUED | OUTPATIENT
Start: 2024-05-18 | End: 2024-05-21

## 2024-05-18 RX ORDER — ARIPIPRAZOLE 15 MG/1
7.5 TABLET ORAL AT BEDTIME
Refills: 0 | Status: DISCONTINUED | OUTPATIENT
Start: 2024-05-18 | End: 2024-05-21

## 2024-05-18 RX ORDER — METOPROLOL TARTRATE 50 MG
50 TABLET ORAL DAILY
Refills: 0 | Status: DISCONTINUED | OUTPATIENT
Start: 2024-05-18 | End: 2024-05-21

## 2024-05-18 RX ORDER — GLUCAGON INJECTION, SOLUTION 0.5 MG/.1ML
1 INJECTION, SOLUTION SUBCUTANEOUS ONCE
Refills: 0 | Status: DISCONTINUED | OUTPATIENT
Start: 2024-05-18 | End: 2024-05-21

## 2024-05-18 RX ORDER — METFORMIN HYDROCHLORIDE 850 MG/1
1 TABLET ORAL
Refills: 0 | DISCHARGE

## 2024-05-18 RX ORDER — SODIUM CHLORIDE 9 MG/ML
1000 INJECTION, SOLUTION INTRAVENOUS
Refills: 0 | Status: DISCONTINUED | OUTPATIENT
Start: 2024-05-18 | End: 2024-05-21

## 2024-05-18 RX ORDER — MUPIROCIN 20 MG/G
1 OINTMENT TOPICAL
Qty: 0 | Refills: 0 | DISCHARGE

## 2024-05-18 RX ORDER — DEXTROSE 10 % IN WATER 10 %
125 INTRAVENOUS SOLUTION INTRAVENOUS ONCE
Refills: 0 | Status: DISCONTINUED | OUTPATIENT
Start: 2024-05-18 | End: 2024-05-21

## 2024-05-18 RX ORDER — DIVALPROEX SODIUM 500 MG/1
750 TABLET, DELAYED RELEASE ORAL
Refills: 0 | Status: DISCONTINUED | OUTPATIENT
Start: 2024-05-18 | End: 2024-05-21

## 2024-05-18 RX ORDER — HYDROCORTISONE 1 %
1 OINTMENT (GRAM) TOPICAL
Refills: 0 | Status: DISCONTINUED | OUTPATIENT
Start: 2024-05-18 | End: 2024-05-18

## 2024-05-18 RX ORDER — DEXTROSE 50 % IN WATER 50 %
25 SYRINGE (ML) INTRAVENOUS ONCE
Refills: 0 | Status: DISCONTINUED | OUTPATIENT
Start: 2024-05-18 | End: 2024-05-21

## 2024-05-18 RX ORDER — DEXTROSE 50 % IN WATER 50 %
15 SYRINGE (ML) INTRAVENOUS ONCE
Refills: 0 | Status: DISCONTINUED | OUTPATIENT
Start: 2024-05-18 | End: 2024-05-21

## 2024-05-18 RX ORDER — ONDANSETRON 8 MG/1
4 TABLET, FILM COATED ORAL EVERY 8 HOURS
Refills: 0 | Status: DISCONTINUED | OUTPATIENT
Start: 2024-05-18 | End: 2024-05-21

## 2024-05-18 RX ORDER — LEVOTHYROXINE SODIUM 125 MCG
75 TABLET ORAL DAILY
Refills: 0 | Status: DISCONTINUED | OUTPATIENT
Start: 2024-05-18 | End: 2024-05-21

## 2024-05-18 RX ORDER — LORATADINE 10 MG/1
10 TABLET ORAL DAILY
Refills: 0 | Status: DISCONTINUED | OUTPATIENT
Start: 2024-05-18 | End: 2024-05-21

## 2024-05-18 RX ORDER — ALBUTEROL 90 UG/1
2 AEROSOL, METERED ORAL EVERY 6 HOURS
Refills: 0 | Status: DISCONTINUED | OUTPATIENT
Start: 2024-05-18 | End: 2024-05-21

## 2024-05-18 RX ORDER — ARIPIPRAZOLE 15 MG/1
5 TABLET ORAL DAILY
Refills: 0 | Status: DISCONTINUED | OUTPATIENT
Start: 2024-05-18 | End: 2024-05-21

## 2024-05-18 RX ORDER — FLUPHENAZINE HYDROCHLORIDE 1 MG/1
5 TABLET, FILM COATED ORAL
Refills: 0 | Status: DISCONTINUED | OUTPATIENT
Start: 2024-05-18 | End: 2024-05-18

## 2024-05-18 RX ORDER — CARBIDOPA AND LEVODOPA 25; 100 MG/1; MG/1
1.5 TABLET ORAL THREE TIMES A DAY
Refills: 0 | Status: DISCONTINUED | OUTPATIENT
Start: 2024-05-18 | End: 2024-05-21

## 2024-05-18 RX ADMIN — LORATADINE 10 MILLIGRAM(S): 10 TABLET ORAL at 13:55

## 2024-05-18 RX ADMIN — ARIPIPRAZOLE 5 MILLIGRAM(S): 15 TABLET ORAL at 13:55

## 2024-05-18 RX ADMIN — Medication 40 MILLIGRAM(S): at 06:10

## 2024-05-18 RX ADMIN — DIVALPROEX SODIUM 750 MILLIGRAM(S): 500 TABLET, DELAYED RELEASE ORAL at 06:11

## 2024-05-18 RX ADMIN — CARBIDOPA AND LEVODOPA 1.5 TABLET(S): 25; 100 TABLET ORAL at 06:10

## 2024-05-18 RX ADMIN — Medication 50 MILLIGRAM(S): at 06:11

## 2024-05-18 RX ADMIN — Medication 2: at 08:57

## 2024-05-18 RX ADMIN — Medication 1 TABLET(S): at 13:55

## 2024-05-18 RX ADMIN — DIVALPROEX SODIUM 750 MILLIGRAM(S): 500 TABLET, DELAYED RELEASE ORAL at 19:16

## 2024-05-18 RX ADMIN — Medication 1 MILLIGRAM(S): at 18:17

## 2024-05-18 RX ADMIN — Medication 1 APPLICATION(S): at 06:11

## 2024-05-18 RX ADMIN — FLUPHENAZINE HYDROCHLORIDE 2.5 MILLIGRAM(S): 1 TABLET, FILM COATED ORAL at 22:30

## 2024-05-18 RX ADMIN — CARBIDOPA AND LEVODOPA 1.5 TABLET(S): 25; 100 TABLET ORAL at 13:55

## 2024-05-18 RX ADMIN — Medication 75 MICROGRAM(S): at 06:10

## 2024-05-18 RX ADMIN — Medication 1 MILLIGRAM(S): at 06:10

## 2024-05-18 NOTE — BH DISCHARGE NOTE NURSING/SOCIAL WORK/PSYCH REHAB - PATIENT PORTAL LINK FT
You can access the FollowMyHealth Patient Portal offered by [facility] by registering at the following website: [email or web address]. By joining [facility]’s FollowMyHealth portal, you will also be able to view your health information using other applications (apps) compatible with our system.

## 2024-05-18 NOTE — SWALLOW BEDSIDE ASSESSMENT ADULT - CONSISTENCIES ADMINISTERED
pt refused/pureed pt refused/minced & moist 3 tsp from lunch tray 2 sips juice/thin liquid regular solid

## 2024-05-18 NOTE — BH CONSULTATION LIAISON ASSESSMENT NOTE - NSBHMSEINTELL_PSY_A_CORE
Below Average Mauc Instructions: By selecting yes to the question below the MAUC number will be added into the note.  This will be calculated automatically based on the diagnosis chosen, the size entered, the body zone selected (H,M,L) and the specific indications you chose. You will also have the option to override the Mohs AUC if you disagree with the automatically calculated number and this option is found in the Case Summary tab.

## 2024-05-18 NOTE — BH CONSULTATION LIAISON ASSESSMENT NOTE - NSBHATTESTAPPAMEND_PSY_A_CORE
I have personally seen and examined this patient. I fully participated in the care of this patient. I have made amendments to the documentation where appropriate and otherwise agree with the history, physical exam, and plan as documented by the MICHELLE

## 2024-05-18 NOTE — H&P ADULT - PROBLEM SELECTOR PLAN 1
New  RR 16 satting 100% RA  No evidence of airway compromise   s/p solumedrol 125mg IV x1, benadryl 50mg IV x1, famotidine 20mg IV x1  Will continue with solumedrol 40mg IV daily and cetirizine 10mg daily

## 2024-05-18 NOTE — H&P ADULT - NSHPLABSRESULTS_GEN_ALL_CORE
11.0   6.39  )-----------( 214      ( 17 May 2024 19:36 )             35.3     139  |  103  |  20  ----------------------------<  114<H>     05-17  5.1   |  22  |  0.71    Ca    9.7      17 May 2024 19:36    TPro  7.6  /  Alb  3.8  /  TBili  <0.2  /  DBili  x   /  AST  47<H>  /  ALT  18  /  AlkPhos  96  05-17        19:36 - VBG - pH: 7.39  | pCO2: 49    | pO2: 41    | Lactate: 2.0

## 2024-05-18 NOTE — H&P ADULT - PROBLEM SELECTOR PLAN 3
Chronic - recently lost diane appeal and needs to take medications- fluphenazine PRN   Continue abilify 5mg AM and 7.5mg PM, bentropine 1mg BID

## 2024-05-18 NOTE — SWALLOW BEDSIDE ASSESSMENT ADULT - SWALLOW EVAL: DIAGNOSIS
1.) Mild oral dyphagia for regular solids characterized by adequate oral acceptance, slightly prolonged though thorough mastication for solids, anterior posterior bolus transport, and oral clearance.  Functional oral phase for thin liquids. 2. Functional pharyngeal phase for thin liquids and regular characterized by swallow initiation and hyolaryngeal excursion upon digital palpation with no overt clinical signs of airway penetration/aspiration.

## 2024-05-18 NOTE — H&P ADULT - NSHPPHYSICALEXAM_GEN_ALL_CORE
PHYSICAL EXAM:  GENERAL: NAD, comfortable at bedside   HEAD:  Atraumatic, Normocephalic, swelling of lower lip   EYES: EOMI, PERRL, conjunctiva and sclera clear  NECK: Supple, No JVD  CHEST/LUNG: Clear to auscultation bilaterally; No wheezes, rales or rhonchi  HEART: Regular rate and rhythm; No murmurs, rubs, or gallops, (+)S1, S2  ABDOMEN: Soft, Nontender, Nondistended; Normal Bowel sounds   EXTREMITIES:  2+ Peripheral Pulses, No clubbing, cyanosis, or edema  PSYCH: unable to assess as pt not willing to participate   NEUROLOGY:  moving all extremities spontaneously   SKIN: No rashes or lesions

## 2024-05-18 NOTE — BH DISCHARGE NOTE NURSING/SOCIAL WORK/PSYCH REHAB - NSDCPRRECOMMEND_PSY_ALL_CORE
Psychiatric rehab recommends that patient maintain medication and treatment compliance once she returns to [facility].

## 2024-05-18 NOTE — BH DISCHARGE NOTE NURSING/SOCIAL WORK/PSYCH REHAB - NSDCPRGOAL_PSY_ALL_CORE
Patient was medically discharged to [facility] and will return to [facility] once medically cleared.
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
## 2024-05-18 NOTE — H&P ADULT - PROBLEM SELECTOR PLAN 2
Chronic moderate exacerbation  /97  Continue metoprolol CL 50mg daily   hold lisinopril given angioedema

## 2024-05-18 NOTE — BH CONSULTATION LIAISON ASSESSMENT NOTE - CURRENT MEDICATION
MEDICATIONS  (STANDING):  ammonium lactate 12% Lotion 1 Application(s) Topical two times a day  ARIPiprazole 5 milliGRAM(s) Oral daily  ARIPiprazole 7.5 milliGRAM(s) Oral at bedtime  benztropine 1 milliGRAM(s) Oral two times a day  carbidopa/levodopa  25/100 1.5 Tablet(s) Oral three times a day  dextrose 10% Bolus 125 milliLiter(s) IV Bolus once  dextrose 5%. 1000 milliLiter(s) (50 mL/Hr) IV Continuous <Continuous>  dextrose 5%. 1000 milliLiter(s) (100 mL/Hr) IV Continuous <Continuous>  dextrose 50% Injectable 25 Gram(s) IV Push once  dextrose 50% Injectable 12.5 Gram(s) IV Push once  divalproex Sprinkle 750 milliGRAM(s) Oral two times a day  enoxaparin Injectable 40 milliGRAM(s) SubCutaneous every 24 hours  glucagon  Injectable 1 milliGRAM(s) IntraMuscular once  insulin lispro (ADMELOG) corrective regimen sliding scale   SubCutaneous three times a day before meals  insulin lispro (ADMELOG) corrective regimen sliding scale   SubCutaneous at bedtime  levothyroxine 75 MICROGram(s) Oral daily  loratadine 10 milliGRAM(s) Oral daily  methylPREDNISolone sodium succinate Injectable 40 milliGRAM(s) IV Push daily  metoprolol succinate ER 50 milliGRAM(s) Oral daily  multivitamin 1 Tablet(s) Oral daily  senna 2 Tablet(s) Oral at bedtime    MEDICATIONS  (PRN):  acetaminophen     Tablet .. 650 milliGRAM(s) Oral every 6 hours PRN Temp greater or equal to 38C (100.4F), Mild Pain (1 - 3)  albuterol    90 MICROgram(s) HFA Inhaler 2 Puff(s) Inhalation every 6 hours PRN Shortness of Breath and/or Wheezing  aluminum hydroxide/magnesium hydroxide/simethicone Suspension 30 milliLiter(s) Oral every 4 hours PRN Dyspepsia  dextrose Oral Gel 15 Gram(s) Oral once PRN Blood Glucose LESS THAN 70 milliGRAM(s)/deciliter  fluPHENAZine  Injectable 5 milliGRAM(s) IntraMuscular two times a day PRN medication refusal/agitation  melatonin 3 milliGRAM(s) Oral at bedtime PRN Insomnia  ondansetron Injectable 4 milliGRAM(s) IV Push every 8 hours PRN Nausea and/or Vomiting

## 2024-05-18 NOTE — PATIENT PROFILE ADULT - FALL HARM RISK - HARM RISK INTERVENTIONS
Assistance with ambulation/Assistance OOB with selected safe patient handling equipment/Communicate Risk of Fall with Harm to all staff/Discuss with provider need for PT consult/Monitor gait and stability/Reinforce activity limits and safety measures with patient and family/Tailored Fall Risk Interventions/Visual Cue: Yellow wristband and red socks/Bed in lowest position, wheels locked, appropriate side rails in place/Call bell, personal items and telephone in reach/Instruct patient to call for assistance before getting out of bed or chair/Non-slip footwear when patient is out of bed/[place] to call system/Physically safe environment - no spills, clutter or unnecessary equipment/Purposeful Proactive Rounding/Room/bathroom lighting operational, light cord in reach

## 2024-05-18 NOTE — CHART NOTE - NSCHARTNOTEFT_GEN_A_CORE
Vital Signs Last 24 Hrs  T(C): 36.6 (18 May 2024 11:27), Max: 36.8 (17 May 2024 17:00)  T(F): 97.8 (18 May 2024 11:27), Max: 98.2 (17 May 2024 17:00)  HR: 81 (18 May 2024 11:27) (77 - 84)  BP: 150/90 (18 May 2024 11:27) (123/65 - 150/97)  BP(mean): --  RR: 18 (18 May 2024 11:27) (16 - 19)  SpO2: 100% (18 May 2024 11:27) (97% - 100%)    Parameters below as of 18 May 2024 11:27  Patient On (Oxygen Delivery Method): room air    Physical exam significant for lip swelling, no respiratory distress   Patient ambulating  Angioedema pos sec to home Lisinopril   Management per H&P

## 2024-05-18 NOTE — BH CONSULTATION LIAISON ASSESSMENT NOTE - RISK ASSESSMENT
Risk Factors: acute/chronic medical conditions, hx schizophrenia with multiple inpatient admissions, aggression, psychosis  Protective Factors: residential stability, positive therapeutic relationships, no history of suicidality

## 2024-05-18 NOTE — H&P ADULT - HISTORY OF PRESENT ILLNESS
73 yr old female with a pmg of of vascular dementia, Parkinson's, retinopathy with macular edema, asthma, seizures, hypothyroidism, HLD, GERD, MDD, MI, malignant neoplasm of the bladder, anxiety, hydrocephalus, TERRY, schizophrenia, HTN who presents to the ED with swelling of her lower lip.   Pt unable to provide hx as not wanting to interact and aide at bedside was told pt presented for swelling of her lips.   s/p treatment in ED with no improvement   ROS unable to obtain as pt not participating in answering questions     Vitals: T97.8, HR 78, /97, RR 16 satting 100% RA

## 2024-05-18 NOTE — BH CONSULTATION LIAISON ASSESSMENT NOTE - NSBHCHARTREVIEWVS_PSY_A_CORE FT
Vital Signs Last 24 Hrs  T(C): 36.6 (18 May 2024 11:27), Max: 36.8 (17 May 2024 17:00)  T(F): 97.8 (18 May 2024 11:27), Max: 98.2 (17 May 2024 17:00)  HR: 81 (18 May 2024 11:27) (77 - 84)  BP: 150/90 (18 May 2024 11:27) (123/65 - 150/97)  BP(mean): --  RR: 18 (18 May 2024 11:27) (16 - 19)  SpO2: 100% (18 May 2024 11:27) (97% - 100%)    Parameters below as of 18 May 2024 11:27  Patient On (Oxygen Delivery Method): room air

## 2024-05-18 NOTE — PATIENT PROFILE ADULT - ARE SIGNIFICANT INDICATORS COMPLETE.
Chief Complaint   Patient presents with   • Abdominal Pain     diffuse abdominal pain x 2 weeks. states has digestive problem in a while. describes pain as sharp in nature.   • Flank Pain     bilateral flank.     Has been constipated for the last 2 weeks. Has been taking otc laxatives but no relief. Feels bloated/nauseated/gassy.      Yes

## 2024-05-18 NOTE — BH CONSULTATION LIAISON ASSESSMENT NOTE - NSBHATTESTCOMMENTATTENDFT_PSY_A_CORE
Chart reviewed, pt. seen/evaluated virtually with Ava Kern NP, I agree with above assessment/plan. Patient alert/inattentive, illogical/disorganized, unable to engage in meaningful interview. Plan as above, will follow

## 2024-05-18 NOTE — BH CONSULTATION LIAISON ASSESSMENT NOTE - NSBHCHARTREVIEWLAB_PSY_A_CORE FT
CBC Full  -  ( 18 May 2024 06:52 )  WBC Count : 6.72 K/uL  RBC Count : 4.01 M/uL  Hemoglobin : 11.6 g/dL  Hematocrit : 35.2 %  Platelet Count - Automated : 237 K/uL  Mean Cell Volume : 87.8 fL  Mean Cell Hemoglobin : 28.9 pg  Mean Cell Hemoglobin Concentration : 33.0 gm/dL  Auto Neutrophil # : 5.84 K/uL  Auto Lymphocyte # : 0.71 K/uL  Auto Monocyte # : 0.13 K/uL  Auto Eosinophil # : 0.00 K/uL  Auto Basophil # : 0.01 K/uL  Auto Neutrophil % : 87.0 %  Auto Lymphocyte % : 10.6 %  Auto Monocyte % : 1.9 %  Auto Eosinophil % : 0.0 %  Auto Basophil % : 0.1 %  05-18    139  |  105  |  23  ----------------------------<  226<H>  4.7   |  20<L>  |  0.82    Ca    9.5      18 May 2024 06:52    TPro  7.6  /  Alb  3.8  /  TBili  <0.2  /  DBili  x   /  AST  47<H>  /  ALT  18  /  AlkPhos  96  05-17

## 2024-05-18 NOTE — SWALLOW BEDSIDE ASSESSMENT ADULT - ADDITIONAL RECOMMENDATIONS
1.) Medical team advised to reconsult service if patient exhibits change in medical condition which may impact oral diet tolerance.   2.) This service to follow up for diet tolerance as schedule permits.

## 2024-05-18 NOTE — SWALLOW BEDSIDE ASSESSMENT ADULT - COMMENTS
Adult H&P 5/18: "73 yr old female presenting with swelling of her lower lip with concerns for angioedema." Chart review indicated patient is from [facility].    No Chest imagine available.     Of Note: Patient known to speech department at [facility]; bedside swallow evaluation completed 12/20/2021 with rec for puree/thin (see note for details.)    Patient received awake, alert, confused, ambulating in room with sparse verbal outbursts unrelated to current situation. Patient cooperated with encouragement and support throughout.
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
## 2024-05-19 DIAGNOSIS — Z29.9 ENCOUNTER FOR PROPHYLACTIC MEASURES, UNSPECIFIED: ICD-10-CM

## 2024-05-19 LAB
ANION GAP SERPL CALC-SCNC: 15 MMOL/L — HIGH (ref 7–14)
BUN SERPL-MCNC: 29 MG/DL — HIGH (ref 7–23)
CALCIUM SERPL-MCNC: 9.1 MG/DL — SIGNIFICANT CHANGE UP (ref 8.4–10.5)
CHLORIDE SERPL-SCNC: 101 MMOL/L — SIGNIFICANT CHANGE UP (ref 98–107)
CO2 SERPL-SCNC: 22 MMOL/L — SIGNIFICANT CHANGE UP (ref 22–31)
CREAT SERPL-MCNC: 0.78 MG/DL — SIGNIFICANT CHANGE UP (ref 0.5–1.3)
EGFR: 81 ML/MIN/1.73M2 — SIGNIFICANT CHANGE UP
GLUCOSE BLDC GLUCOMTR-MCNC: 195 MG/DL — HIGH (ref 70–99)
GLUCOSE BLDC GLUCOMTR-MCNC: 211 MG/DL — HIGH (ref 70–99)
GLUCOSE BLDC GLUCOMTR-MCNC: 221 MG/DL — HIGH (ref 70–99)
GLUCOSE BLDC GLUCOMTR-MCNC: 294 MG/DL — HIGH (ref 70–99)
GLUCOSE SERPL-MCNC: 193 MG/DL — HIGH (ref 70–99)
HAV IGM SER-ACNC: SIGNIFICANT CHANGE UP
HBV CORE IGM SER-ACNC: SIGNIFICANT CHANGE UP
HBV SURFACE AG SER-ACNC: SIGNIFICANT CHANGE UP
HCT VFR BLD CALC: 33.4 % — LOW (ref 34.5–45)
HCV AB S/CO SERPL IA: 0.21 S/CO — SIGNIFICANT CHANGE UP (ref 0–0.99)
HCV AB SERPL-IMP: SIGNIFICANT CHANGE UP
HGB BLD-MCNC: 11.2 G/DL — LOW (ref 11.5–15.5)
MAGNESIUM SERPL-MCNC: 2 MG/DL — SIGNIFICANT CHANGE UP (ref 1.6–2.6)
MCHC RBC-ENTMCNC: 28.9 PG — SIGNIFICANT CHANGE UP (ref 27–34)
MCHC RBC-ENTMCNC: 33.5 GM/DL — SIGNIFICANT CHANGE UP (ref 32–36)
MCV RBC AUTO: 86.3 FL — SIGNIFICANT CHANGE UP (ref 80–100)
NRBC # BLD: 0 /100 WBCS — SIGNIFICANT CHANGE UP (ref 0–0)
NRBC # FLD: 0 K/UL — SIGNIFICANT CHANGE UP (ref 0–0)
PHOSPHATE SERPL-MCNC: 2.8 MG/DL — SIGNIFICANT CHANGE UP (ref 2.5–4.5)
PLATELET # BLD AUTO: 249 K/UL — SIGNIFICANT CHANGE UP (ref 150–400)
POTASSIUM SERPL-MCNC: 4.3 MMOL/L — SIGNIFICANT CHANGE UP (ref 3.5–5.3)
POTASSIUM SERPL-SCNC: 4.3 MMOL/L — SIGNIFICANT CHANGE UP (ref 3.5–5.3)
RBC # BLD: 3.87 M/UL — SIGNIFICANT CHANGE UP (ref 3.8–5.2)
RBC # FLD: 14.1 % — SIGNIFICANT CHANGE UP (ref 10.3–14.5)
SODIUM SERPL-SCNC: 138 MMOL/L — SIGNIFICANT CHANGE UP (ref 135–145)
VALPROATE SERPL-MCNC: 59.9 UG/ML — SIGNIFICANT CHANGE UP (ref 50–100)
WBC # BLD: 7.36 K/UL — SIGNIFICANT CHANGE UP (ref 3.8–10.5)
WBC # FLD AUTO: 7.36 K/UL — SIGNIFICANT CHANGE UP (ref 3.8–10.5)

## 2024-05-19 PROCEDURE — 99232 SBSQ HOSP IP/OBS MODERATE 35: CPT

## 2024-05-19 RX ADMIN — DIVALPROEX SODIUM 750 MILLIGRAM(S): 500 TABLET, DELAYED RELEASE ORAL at 05:29

## 2024-05-19 RX ADMIN — DIVALPROEX SODIUM 750 MILLIGRAM(S): 500 TABLET, DELAYED RELEASE ORAL at 18:44

## 2024-05-19 RX ADMIN — FLUPHENAZINE HYDROCHLORIDE 2.5 MILLIGRAM(S): 1 TABLET, FILM COATED ORAL at 12:44

## 2024-05-19 RX ADMIN — Medication 3 MILLIGRAM(S): at 22:23

## 2024-05-19 RX ADMIN — CARBIDOPA AND LEVODOPA 1.5 TABLET(S): 25; 100 TABLET ORAL at 05:31

## 2024-05-19 RX ADMIN — Medication 2: at 11:29

## 2024-05-19 RX ADMIN — FLUPHENAZINE HYDROCHLORIDE 2.5 MILLIGRAM(S): 1 TABLET, FILM COATED ORAL at 19:40

## 2024-05-19 RX ADMIN — LORATADINE 10 MILLIGRAM(S): 10 TABLET ORAL at 11:23

## 2024-05-19 RX ADMIN — Medication 40 MILLIGRAM(S): at 06:30

## 2024-05-19 RX ADMIN — Medication 1 MILLIGRAM(S): at 18:44

## 2024-05-19 RX ADMIN — Medication 75 MICROGRAM(S): at 05:29

## 2024-05-19 RX ADMIN — Medication 50 MILLIGRAM(S): at 05:29

## 2024-05-19 RX ADMIN — CARBIDOPA AND LEVODOPA 1.5 TABLET(S): 25; 100 TABLET ORAL at 11:23

## 2024-05-19 RX ADMIN — ARIPIPRAZOLE 7.5 MILLIGRAM(S): 15 TABLET ORAL at 22:22

## 2024-05-19 RX ADMIN — CARBIDOPA AND LEVODOPA 1.5 TABLET(S): 25; 100 TABLET ORAL at 22:22

## 2024-05-19 RX ADMIN — Medication 1 MILLIGRAM(S): at 05:28

## 2024-05-19 RX ADMIN — ARIPIPRAZOLE 5 MILLIGRAM(S): 15 TABLET ORAL at 11:23

## 2024-05-19 RX ADMIN — Medication 2: at 16:34

## 2024-05-19 RX ADMIN — Medication 1: at 22:31

## 2024-05-19 RX ADMIN — Medication 1: at 07:43

## 2024-05-19 RX ADMIN — ENOXAPARIN SODIUM 40 MILLIGRAM(S): 100 INJECTION SUBCUTANEOUS at 11:23

## 2024-05-19 RX ADMIN — SENNA PLUS 2 TABLET(S): 8.6 TABLET ORAL at 22:23

## 2024-05-19 RX ADMIN — Medication 1 TABLET(S): at 11:23

## 2024-05-20 LAB
ANION GAP SERPL CALC-SCNC: 11 MMOL/L — SIGNIFICANT CHANGE UP (ref 7–14)
BUN SERPL-MCNC: 24 MG/DL — HIGH (ref 7–23)
CALCIUM SERPL-MCNC: 9.5 MG/DL — SIGNIFICANT CHANGE UP (ref 8.4–10.5)
CHLORIDE SERPL-SCNC: 104 MMOL/L — SIGNIFICANT CHANGE UP (ref 98–107)
CO2 SERPL-SCNC: 24 MMOL/L — SIGNIFICANT CHANGE UP (ref 22–31)
CREAT SERPL-MCNC: 0.72 MG/DL — SIGNIFICANT CHANGE UP (ref 0.5–1.3)
EGFR: 89 ML/MIN/1.73M2 — SIGNIFICANT CHANGE UP
GLUCOSE BLDC GLUCOMTR-MCNC: 174 MG/DL — HIGH (ref 70–99)
GLUCOSE BLDC GLUCOMTR-MCNC: 180 MG/DL — HIGH (ref 70–99)
GLUCOSE BLDC GLUCOMTR-MCNC: 215 MG/DL — HIGH (ref 70–99)
GLUCOSE BLDC GLUCOMTR-MCNC: 245 MG/DL — HIGH (ref 70–99)
GLUCOSE SERPL-MCNC: 161 MG/DL — HIGH (ref 70–99)
HCT VFR BLD CALC: 32.9 % — LOW (ref 34.5–45)
HGB BLD-MCNC: 10.9 G/DL — LOW (ref 11.5–15.5)
MAGNESIUM SERPL-MCNC: 2 MG/DL — SIGNIFICANT CHANGE UP (ref 1.6–2.6)
MCHC RBC-ENTMCNC: 28.5 PG — SIGNIFICANT CHANGE UP (ref 27–34)
MCHC RBC-ENTMCNC: 33.1 GM/DL — SIGNIFICANT CHANGE UP (ref 32–36)
MCV RBC AUTO: 86.1 FL — SIGNIFICANT CHANGE UP (ref 80–100)
MRSA PCR RESULT.: DETECTED
NRBC # BLD: 0 /100 WBCS — SIGNIFICANT CHANGE UP (ref 0–0)
NRBC # FLD: 0 K/UL — SIGNIFICANT CHANGE UP (ref 0–0)
PHOSPHATE SERPL-MCNC: 2.8 MG/DL — SIGNIFICANT CHANGE UP (ref 2.5–4.5)
PLATELET # BLD AUTO: 241 K/UL — SIGNIFICANT CHANGE UP (ref 150–400)
POTASSIUM SERPL-MCNC: 4 MMOL/L — SIGNIFICANT CHANGE UP (ref 3.5–5.3)
POTASSIUM SERPL-SCNC: 4 MMOL/L — SIGNIFICANT CHANGE UP (ref 3.5–5.3)
RBC # BLD: 3.82 M/UL — SIGNIFICANT CHANGE UP (ref 3.8–5.2)
RBC # FLD: 14 % — SIGNIFICANT CHANGE UP (ref 10.3–14.5)
S AUREUS DNA NOSE QL NAA+PROBE: DETECTED
SODIUM SERPL-SCNC: 139 MMOL/L — SIGNIFICANT CHANGE UP (ref 135–145)
WBC # BLD: 7.12 K/UL — SIGNIFICANT CHANGE UP (ref 3.8–10.5)
WBC # FLD AUTO: 7.12 K/UL — SIGNIFICANT CHANGE UP (ref 3.8–10.5)

## 2024-05-20 PROCEDURE — 99233 SBSQ HOSP IP/OBS HIGH 50: CPT

## 2024-05-20 PROCEDURE — 99232 SBSQ HOSP IP/OBS MODERATE 35: CPT

## 2024-05-20 RX ORDER — MUPIROCIN 20 MG/G
1 OINTMENT TOPICAL
Refills: 0 | Status: DISCONTINUED | OUTPATIENT
Start: 2024-05-20 | End: 2024-05-21

## 2024-05-20 RX ORDER — MUPIROCIN 20 MG/G
1 OINTMENT TOPICAL
Refills: 0 | Status: DISCONTINUED | OUTPATIENT
Start: 2024-05-20 | End: 2024-05-20

## 2024-05-20 RX ADMIN — DIVALPROEX SODIUM 750 MILLIGRAM(S): 500 TABLET, DELAYED RELEASE ORAL at 17:43

## 2024-05-20 RX ADMIN — DIVALPROEX SODIUM 750 MILLIGRAM(S): 500 TABLET, DELAYED RELEASE ORAL at 05:22

## 2024-05-20 RX ADMIN — SENNA PLUS 2 TABLET(S): 8.6 TABLET ORAL at 22:05

## 2024-05-20 RX ADMIN — ARIPIPRAZOLE 7.5 MILLIGRAM(S): 15 TABLET ORAL at 22:05

## 2024-05-20 RX ADMIN — MUPIROCIN 1 APPLICATION(S): 20 OINTMENT TOPICAL at 22:07

## 2024-05-20 RX ADMIN — Medication 1: at 16:49

## 2024-05-20 RX ADMIN — ENOXAPARIN SODIUM 40 MILLIGRAM(S): 100 INJECTION SUBCUTANEOUS at 12:44

## 2024-05-20 RX ADMIN — Medication 1 APPLICATION(S): at 05:21

## 2024-05-20 RX ADMIN — Medication 1: at 08:13

## 2024-05-20 RX ADMIN — ARIPIPRAZOLE 5 MILLIGRAM(S): 15 TABLET ORAL at 12:43

## 2024-05-20 RX ADMIN — Medication 40 MILLIGRAM(S): at 17:42

## 2024-05-20 RX ADMIN — Medication 1 MILLIGRAM(S): at 05:21

## 2024-05-20 RX ADMIN — Medication 1 APPLICATION(S): at 19:14

## 2024-05-20 RX ADMIN — Medication 50 MILLIGRAM(S): at 05:21

## 2024-05-20 RX ADMIN — LORATADINE 10 MILLIGRAM(S): 10 TABLET ORAL at 12:43

## 2024-05-20 RX ADMIN — Medication 75 MICROGRAM(S): at 05:22

## 2024-05-20 RX ADMIN — CARBIDOPA AND LEVODOPA 1.5 TABLET(S): 25; 100 TABLET ORAL at 05:21

## 2024-05-20 RX ADMIN — Medication 1 TABLET(S): at 12:43

## 2024-05-20 RX ADMIN — CARBIDOPA AND LEVODOPA 1.5 TABLET(S): 25; 100 TABLET ORAL at 15:15

## 2024-05-20 RX ADMIN — CARBIDOPA AND LEVODOPA 1.5 TABLET(S): 25; 100 TABLET ORAL at 22:05

## 2024-05-20 RX ADMIN — Medication 1 MILLIGRAM(S): at 17:45

## 2024-05-20 NOTE — DIETITIAN INITIAL EVALUATION ADULT - OTHER INFO
73 yr old female presenting with swelling of her lower lip with concerns for angioedema.    Pt seen, RN reports 50% intake of meals saying pt mostly taking liquids. Pt with c/o constipation, receiving bowel regimen at present. As per SLP recommendation (5/18/24)- Easy To Chew/Thin. rec supplement (vanilla) Glucerna Therapeutic Nutrition 8oz. 2x daily (~440 Kcals, ~20 gm Protein). No wts at admission. Request updated wts. Noted skin intact, +1 edema Rt & Lt foot per nursing flow sheet. Labs reviewed. A1c- 8.3% (H) (5/18/24)- uncontrolled DM.

## 2024-05-20 NOTE — BH INPATIENT PSYCHIATRY DISCHARGE NOTE - NSBHMETABOLIC_PSY_ALL_CORE_FT
BMI: BMI (kg/m2): 30.2 (01-15-24 @ 12:52)  HbA1c: A1C with Estimated Average Glucose Result: 8.3 % (05-18-24 @ 06:52)    Glucose: POCT Blood Glucose.: 180 mg/dL (05-20-24 @ 07:24)    BP: 141/77 (05-17-24 @ 17:00) (141/77 - 141/77)Vital Signs Last 24 Hrs  T(C): 36.4 (05-20-24 @ 05:14), Max: 36.4 (05-20-24 @ 05:14)  T(F): 97.6 (05-20-24 @ 05:14), Max: 97.6 (05-20-24 @ 05:14)  HR: 78 (05-20-24 @ 05:14) (78 - 92)  BP: 117/81 (05-20-24 @ 05:14) (117/81 - 172/86)  BP(mean): --  RR: 18 (05-20-24 @ 05:14) (18 - 18)  SpO2: 99% (05-20-24 @ 05:14) (97% - 99%)      Lipid Panel:

## 2024-05-20 NOTE — DIETITIAN INITIAL EVALUATION ADULT - PERTINENT MEDS FT
MEDICATIONS  (STANDING):  ammonium lactate 12% Lotion 1 Application(s) Topical two times a day  ARIPiprazole 5 milliGRAM(s) Oral daily  ARIPiprazole 7.5 milliGRAM(s) Oral at bedtime  benztropine 1 milliGRAM(s) Oral two times a day  carbidopa/levodopa  25/100 1.5 Tablet(s) Oral three times a day  dextrose 10% Bolus 125 milliLiter(s) IV Bolus once  dextrose 5%. 1000 milliLiter(s) (50 mL/Hr) IV Continuous <Continuous>  dextrose 5%. 1000 milliLiter(s) (100 mL/Hr) IV Continuous <Continuous>  dextrose 50% Injectable 25 Gram(s) IV Push once  dextrose 50% Injectable 12.5 Gram(s) IV Push once  divalproex Sprinkle 750 milliGRAM(s) Oral two times a day  enoxaparin Injectable 40 milliGRAM(s) SubCutaneous every 24 hours  glucagon  Injectable 1 milliGRAM(s) IntraMuscular once  insulin lispro (ADMELOG) corrective regimen sliding scale   SubCutaneous three times a day before meals  insulin lispro (ADMELOG) corrective regimen sliding scale   SubCutaneous at bedtime  levothyroxine 75 MICROGram(s) Oral daily  loratadine 10 milliGRAM(s) Oral daily  methylPREDNISolone 40 milliGRAM(s) Oral daily  metoprolol succinate ER 50 milliGRAM(s) Oral daily  multivitamin 1 Tablet(s) Oral daily  mupirocin 2% Nasal 1 Application(s) Both Nostrils two times a day  senna 2 Tablet(s) Oral at bedtime    MEDICATIONS  (PRN):  acetaminophen     Tablet .. 650 milliGRAM(s) Oral every 6 hours PRN Temp greater or equal to 38C (100.4F), Mild Pain (1 - 3)  albuterol    90 MICROgram(s) HFA Inhaler 2 Puff(s) Inhalation every 6 hours PRN Shortness of Breath and/or Wheezing  aluminum hydroxide/magnesium hydroxide/simethicone Suspension 30 milliLiter(s) Oral every 4 hours PRN Dyspepsia  dextrose Oral Gel 15 Gram(s) Oral once PRN Blood Glucose LESS THAN 70 milliGRAM(s)/deciliter  fluPHENAZine  Injectable 2.5 milliGRAM(s) IntraMuscular every 6 hours PRN agitation  melatonin 3 milliGRAM(s) Oral at bedtime PRN Insomnia  ondansetron Injectable 4 milliGRAM(s) IV Push every 8 hours PRN Nausea and/or Vomiting

## 2024-05-20 NOTE — BH INPATIENT PSYCHIATRY DISCHARGE NOTE - OTHER PAST PSYCHIATRIC HISTORY (INCLUDE DETAILS REGARDING ONSET, COURSE OF ILLNESS, INPATIENT/OUTPATIENT TREATMENT)
Per chart, long hx of schizophrenia with multiple past inpatient hospitalizations, many at [facility]--last in October-November 2021

## 2024-05-20 NOTE — DIETITIAN INITIAL EVALUATION ADULT - NS FNS DIET ORDER
Diet, Regular:   Consistent Carbohydrate {Evening Snack} (CSTCHOSN)  DASH/TLC {Sodium & Cholesterol Restricted} (DASH)  Easy to Chew (EASYTOCHEW) (05-18-24 @ 14:35) [Active]

## 2024-05-20 NOTE — SOCIAL WORK POST DISCHARGE FOLLOW UP NOTE - NSBHSWFOLLOWUP_PSY_ALL_CORE_FT
TRISH left a voicemail informing the pt's sister, Natali Mcneil, [phone], that the pt was transferred to [facility] and admitted medically due to a swollen lower lip. TRISH advised her that when medically cleared it was anticipated that the pt would return to [facility] for continued psychiatric treatment. TRISH spoke with the pt's sister, Natali Mcneil, [phone], who was aware of the pt's transferto [facility] due to a swollen lower lip. TRISH advised the pt's sister that when medically cleared it is anticipated that the pt will return to [facility] for continued psychiatric treatment.

## 2024-05-20 NOTE — DIETITIAN INITIAL EVALUATION ADULT - ADD RECOMMEND
Honor food and beverage preferences within diet restriction of patient in an effort to maximize level of nutrient intake    Monitor PO intake, tolerance to nutrition supplement, weight trends, nutrition related lab values, BMs/GI distress, hydration status, skin integrity.
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
## 2024-05-20 NOTE — BH INPATIENT PSYCHIATRY DISCHARGE NOTE - NSDCMRMEDTOKEN_GEN_ALL_CORE_FT
albuterol 90 mcg/inh inhalation aerosol: 2 puff(s) inhaled every 6 hours As needed Shortness of Breath and/or Wheezing  ammonium lactate 12% topical lotion: 1 Apply topically to affected area 2 times a day  ARIPiprazole 5 mg oral tablet: 7.5 milligram(s) orally once a day (at bedtime)  ARIPiprazole 5 mg oral tablet: 1 tab(s) orally once a day  benztropine 1 mg oral tablet: 1 tab(s) orally 2 times a day  carbidopa-levodopa 25 mg-100 mg oral tablet: 1.5 tab(s) orally 3 times a day  Depakote Sprinkles 125 mg oral delayed release capsule: 750 milligram(s) orally 2 times a day  fluphenazine 5mg IM BID for refusal of medication per court order, agitation:   hydrocortisone 2.5% topical ointment: Apply topically to affected area 2 times a day  levothyroxine 75 mcg (0.075 mg) oral tablet: 1 tab(s) orally once a day  lisinopril 20 mg oral tablet: 1 tab(s) orally once a day  metFORMIN 500 mg oral tablet: 1 tab(s) orally 2 times a day  metoprolol succinate 50 mg oral tablet, extended release: 1 tab(s) orally once a day  Multiple Vitamins oral tablet: 1 tab(s) orally once a day  senna (sennosides) 8.6 mg oral tablet: 2 tab(s) orally once a day (at bedtime)

## 2024-05-20 NOTE — DIETITIAN INITIAL EVALUATION ADULT - PERTINENT LABORATORY DATA
05-20    139  |  104  |  24<H>  ----------------------------<  161<H>  4.0   |  24  |  0.72    Ca    9.5      20 May 2024 04:58  Phos  2.8     05-20  Mg     2.00     05-20    POCT Blood Glucose.: 215 mg/dL (05-20-24 @ 12:38)  A1C with Estimated Average Glucose Result: 8.3 % (05-18-24 @ 06:52)  A1C with Estimated Average Glucose Result: 7.6 % (01-12-24 @ 12:40)
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
## 2024-05-21 ENCOUNTER — TRANSCRIPTION ENCOUNTER (OUTPATIENT)
Age: 74
End: 2024-05-21

## 2024-05-21 ENCOUNTER — INPATIENT (INPATIENT)
Facility: HOSPITAL | Age: 74
LOS: 129 days | Discharge: OTHER INSTITUTION | End: 2024-09-28
Attending: PSYCHIATRY & NEUROLOGY | Admitting: PSYCHIATRY & NEUROLOGY
Payer: MEDICAID

## 2024-05-21 VITALS
DIASTOLIC BLOOD PRESSURE: 88 MMHG | TEMPERATURE: 98 F | RESPIRATION RATE: 16 BRPM | OXYGEN SATURATION: 100 % | HEIGHT: 60 IN | WEIGHT: 167.99 LBS | SYSTOLIC BLOOD PRESSURE: 161 MMHG | HEART RATE: 84 BPM

## 2024-05-21 VITALS
OXYGEN SATURATION: 100 % | SYSTOLIC BLOOD PRESSURE: 125 MMHG | HEART RATE: 65 BPM | RESPIRATION RATE: 16 BRPM | DIASTOLIC BLOOD PRESSURE: 65 MMHG | TEMPERATURE: 98 F

## 2024-05-21 DIAGNOSIS — F33.9 MAJOR DEPRESSIVE DISORDER, RECURRENT, UNSPECIFIED: ICD-10-CM

## 2024-05-21 LAB
ANION GAP SERPL CALC-SCNC: 13 MMOL/L — SIGNIFICANT CHANGE UP (ref 7–14)
BUN SERPL-MCNC: 26 MG/DL — HIGH (ref 7–23)
CALCIUM SERPL-MCNC: 10 MG/DL — SIGNIFICANT CHANGE UP (ref 8.4–10.5)
CHLORIDE SERPL-SCNC: 103 MMOL/L — SIGNIFICANT CHANGE UP (ref 98–107)
CO2 SERPL-SCNC: 21 MMOL/L — LOW (ref 22–31)
CREAT SERPL-MCNC: 0.75 MG/DL — SIGNIFICANT CHANGE UP (ref 0.5–1.3)
EGFR: 85 ML/MIN/1.73M2 — SIGNIFICANT CHANGE UP
FLUAV AG NPH QL: SIGNIFICANT CHANGE UP
FLUBV AG NPH QL: SIGNIFICANT CHANGE UP
GLUCOSE BLDC GLUCOMTR-MCNC: 107 MG/DL — HIGH (ref 70–99)
GLUCOSE BLDC GLUCOMTR-MCNC: 120 MG/DL — HIGH (ref 70–99)
GLUCOSE SERPL-MCNC: 172 MG/DL — HIGH (ref 70–99)
HCT VFR BLD CALC: 35.2 % — SIGNIFICANT CHANGE UP (ref 34.5–45)
HGB BLD-MCNC: 11.5 G/DL — SIGNIFICANT CHANGE UP (ref 11.5–15.5)
HIV1 RNA SERPL QL NAA+PROBE: NEGATIVE — SIGNIFICANT CHANGE UP
HIV1+2 AB SPEC QL: SIGNIFICANT CHANGE UP
HIV1+2 AB SPEC QL: SIGNIFICANT CHANGE UP
MAGNESIUM SERPL-MCNC: 2 MG/DL — SIGNIFICANT CHANGE UP (ref 1.6–2.6)
MCHC RBC-ENTMCNC: 28.8 PG — SIGNIFICANT CHANGE UP (ref 27–34)
MCHC RBC-ENTMCNC: 32.7 GM/DL — SIGNIFICANT CHANGE UP (ref 32–36)
MCV RBC AUTO: 88 FL — SIGNIFICANT CHANGE UP (ref 80–100)
NRBC # BLD: 0 /100 WBCS — SIGNIFICANT CHANGE UP (ref 0–0)
NRBC # FLD: 0 K/UL — SIGNIFICANT CHANGE UP (ref 0–0)
PHOSPHATE SERPL-MCNC: 3.5 MG/DL — SIGNIFICANT CHANGE UP (ref 2.5–4.5)
PLATELET # BLD AUTO: 239 K/UL — SIGNIFICANT CHANGE UP (ref 150–400)
POTASSIUM SERPL-MCNC: 4.5 MMOL/L — SIGNIFICANT CHANGE UP (ref 3.5–5.3)
POTASSIUM SERPL-SCNC: 4.5 MMOL/L — SIGNIFICANT CHANGE UP (ref 3.5–5.3)
RBC # BLD: 4 M/UL — SIGNIFICANT CHANGE UP (ref 3.8–5.2)
RBC # FLD: 13.8 % — SIGNIFICANT CHANGE UP (ref 10.3–14.5)
RSV RNA NPH QL NAA+NON-PROBE: SIGNIFICANT CHANGE UP
SARS-COV-2 RNA SPEC QL NAA+PROBE: SIGNIFICANT CHANGE UP
SODIUM SERPL-SCNC: 137 MMOL/L — SIGNIFICANT CHANGE UP (ref 135–145)
WBC # BLD: 7.48 K/UL — SIGNIFICANT CHANGE UP (ref 3.8–10.5)
WBC # FLD AUTO: 7.48 K/UL — SIGNIFICANT CHANGE UP (ref 3.8–10.5)

## 2024-05-21 PROCEDURE — 99233 SBSQ HOSP IP/OBS HIGH 50: CPT

## 2024-05-21 PROCEDURE — 99239 HOSP IP/OBS DSCHRG MGMT >30: CPT

## 2024-05-21 RX ORDER — CARBIDOPA/LEVODOPA 25MG-100MG
1.5 TABLET ORAL THREE TIMES A DAY
Refills: 0 | Status: DISCONTINUED | OUTPATIENT
Start: 2024-05-21 | End: 2024-06-12

## 2024-05-21 RX ORDER — LEVOTHYROXINE SODIUM 300 MCG
75 TABLET ORAL DAILY
Refills: 0 | Status: DISCONTINUED | OUTPATIENT
Start: 2024-05-21 | End: 2024-09-28

## 2024-05-21 RX ORDER — POLYETHYLENE GLYCOL 3350 17 G/17G
17 POWDER, FOR SOLUTION ORAL DAILY
Refills: 0 | Status: DISCONTINUED | OUTPATIENT
Start: 2024-05-21 | End: 2024-08-17

## 2024-05-21 RX ORDER — ACETAMINOPHEN 500 MG/5ML
650 LIQUID (ML) ORAL EVERY 6 HOURS
Refills: 0 | Status: DISCONTINUED | OUTPATIENT
Start: 2024-05-21 | End: 2024-09-28

## 2024-05-21 RX ORDER — MAGNESIUM, ALUMINUM HYDROXIDE 200-200 MG
30 TABLET,CHEWABLE ORAL EVERY 4 HOURS
Refills: 0 | Status: DISCONTINUED | OUTPATIENT
Start: 2024-05-21 | End: 2024-09-28

## 2024-05-21 RX ORDER — ARIPIPRAZOLE 2 MG/1
5 TABLET ORAL DAILY
Refills: 0 | Status: DISCONTINUED | OUTPATIENT
Start: 2024-05-21 | End: 2024-05-23

## 2024-05-21 RX ORDER — BENZTROPINE MESYLATE 1 MG
1 TABLET ORAL
Refills: 0 | DISCHARGE

## 2024-05-21 RX ORDER — FLUPHENAZINE HCL 10 MG
5 TABLET ORAL
Refills: 0 | Status: DISCONTINUED | OUTPATIENT
Start: 2024-05-21 | End: 2024-05-22

## 2024-05-21 RX ORDER — CHLORHEXIDINE GLUCONATE 213 G/1000ML
1 SOLUTION TOPICAL
Qty: 0 | Refills: 0 | DISCHARGE
Start: 2024-05-21

## 2024-05-21 RX ORDER — CARBIDOPA AND LEVODOPA 25; 100 MG/1; MG/1
1.5 TABLET ORAL
Qty: 0 | Refills: 0 | DISCHARGE
Start: 2024-05-21

## 2024-05-21 RX ORDER — SENNA 187 MG
2 TABLET ORAL AT BEDTIME
Refills: 0 | Status: DISCONTINUED | OUTPATIENT
Start: 2024-05-21 | End: 2024-08-07

## 2024-05-21 RX ORDER — SODIUM CHLORIDE 0.65 %
1 AEROSOL, SPRAY (ML) NASAL EVERY 8 HOURS
Refills: 0 | Status: DISCONTINUED | OUTPATIENT
Start: 2024-05-21 | End: 2024-09-15

## 2024-05-21 RX ORDER — ARIPIPRAZOLE 15 MG/1
7.5 TABLET ORAL
Refills: 0 | DISCHARGE

## 2024-05-21 RX ORDER — MAGNESIUM HYDROXIDE 400 MG/1
30 TABLET, CHEWABLE ORAL ONCE
Refills: 0 | Status: COMPLETED | OUTPATIENT
Start: 2024-05-21 | End: 2024-05-21

## 2024-05-21 RX ORDER — HYDROCORTISONE 1 %
1 OINTMENT (GRAM) TOPICAL
Refills: 0 | DISCHARGE

## 2024-05-21 RX ORDER — SENNA PLUS 8.6 MG/1
2 TABLET ORAL
Refills: 0 | DISCHARGE

## 2024-05-21 RX ORDER — SOD,AMMONIUM,POTASSIUM LACTATE
1 CREAM (GRAM) TOPICAL
Qty: 0 | Refills: 0 | DISCHARGE
Start: 2024-05-21

## 2024-05-21 RX ORDER — SENNA PLUS 8.6 MG/1
2 TABLET ORAL
Qty: 0 | Refills: 0 | DISCHARGE
Start: 2024-05-21

## 2024-05-21 RX ORDER — LORATADINE 5 MG/5ML
10 SOLUTION ORAL DAILY
Refills: 0 | Status: DISCONTINUED | OUTPATIENT
Start: 2024-05-21 | End: 2024-06-04

## 2024-05-21 RX ORDER — DIVALPROEX SODIUM 500 MG/1
6 TABLET, DELAYED RELEASE ORAL
Qty: 0 | Refills: 0 | DISCHARGE
Start: 2024-05-21

## 2024-05-21 RX ORDER — METHYLPREDNISOLONE ACETATE 80 MG/ML
20 INJECTION, SUSPENSION INTRA-ARTICULAR; INTRALESIONAL; INTRAMUSCULAR; SOFT TISSUE DAILY
Refills: 0 | Status: COMPLETED | OUTPATIENT
Start: 2024-05-22 | End: 2024-05-23

## 2024-05-21 RX ORDER — LORATADINE 10 MG/1
1 TABLET ORAL
Qty: 0 | Refills: 0 | DISCHARGE
Start: 2024-05-21

## 2024-05-21 RX ORDER — ARIPIPRAZOLE 15 MG/1
7.5 TABLET ORAL
Qty: 0 | Refills: 0 | DISCHARGE
Start: 2024-05-21

## 2024-05-21 RX ORDER — ARIPIPRAZOLE 15 MG/1
1 TABLET ORAL
Refills: 0 | DISCHARGE

## 2024-05-21 RX ORDER — BENZTROPINE MESYLATE 1 MG
1 TABLET ORAL
Qty: 0 | Refills: 0 | DISCHARGE
Start: 2024-05-21

## 2024-05-21 RX ORDER — DIVALPROEX SODIUM 500 MG/1
750 TABLET, DELAYED RELEASE ORAL
Refills: 0 | DISCHARGE

## 2024-05-21 RX ORDER — METOPROLOL TARTRATE 50 MG
1 TABLET ORAL
Qty: 0 | Refills: 0 | DISCHARGE
Start: 2024-05-21

## 2024-05-21 RX ORDER — FLUPHENAZINE HCL 10 MG
2.5 TABLET ORAL EVERY 6 HOURS
Refills: 0 | Status: DISCONTINUED | OUTPATIENT
Start: 2024-05-21 | End: 2024-05-22

## 2024-05-21 RX ORDER — ARIPIPRAZOLE 15 MG/1
1 TABLET ORAL
Qty: 0 | Refills: 0 | DISCHARGE
Start: 2024-05-21

## 2024-05-21 RX ORDER — ARIPIPRAZOLE 2 MG/1
7.5 TABLET ORAL AT BEDTIME
Refills: 0 | Status: DISCONTINUED | OUTPATIENT
Start: 2024-05-21 | End: 2024-05-22

## 2024-05-21 RX ORDER — METOPROLOL TARTRATE 50 MG
1 TABLET ORAL
Refills: 0 | DISCHARGE

## 2024-05-21 RX ORDER — FLUPHENAZINE HYDROCHLORIDE 1 MG/1
0 TABLET, FILM COATED ORAL
Qty: 0 | Refills: 0 | DISCHARGE
Start: 2024-05-21

## 2024-05-21 RX ORDER — BISACODYL 5 MG
10 TABLET, DELAYED RELEASE (ENTERIC COATED) ORAL DAILY
Refills: 0 | Status: DISCONTINUED | OUTPATIENT
Start: 2024-05-21 | End: 2024-09-28

## 2024-05-21 RX ORDER — BENZTROPINE MESYLATE 2 MG
1 TABLET ORAL
Refills: 0 | Status: DISCONTINUED | OUTPATIENT
Start: 2024-05-21 | End: 2024-08-17

## 2024-05-21 RX ORDER — SIMETHICONE 80 MG
80 TABLET,CHEWABLE ORAL EVERY 8 HOURS
Refills: 0 | Status: DISCONTINUED | OUTPATIENT
Start: 2024-05-21 | End: 2024-08-07

## 2024-05-21 RX ORDER — ALBUTEROL SULFATE 2.5 MG/3ML
2 VIAL, NEBULIZER (ML) INHALATION EVERY 6 HOURS
Refills: 0 | Status: DISCONTINUED | OUTPATIENT
Start: 2024-05-21 | End: 2024-09-28

## 2024-05-21 RX ORDER — METFORMIN HYDROCHLORIDE 850 MG/1
500 TABLET ORAL
Refills: 0 | Status: DISCONTINUED | OUTPATIENT
Start: 2024-05-21 | End: 2024-05-22

## 2024-05-21 RX ORDER — LEVOTHYROXINE SODIUM 125 MCG
1 TABLET ORAL
Qty: 0 | Refills: 0 | DISCHARGE
Start: 2024-05-21

## 2024-05-21 RX ORDER — B1/B2/B3/B5/B6/B12/VIT C/FOLIC 500-0.5 MG
1 TABLET ORAL DAILY
Refills: 0 | Status: DISCONTINUED | OUTPATIENT
Start: 2024-05-21 | End: 2024-05-21

## 2024-05-21 RX ORDER — CHLORHEXIDINE GLUCONATE 213 G/1000ML
1 SOLUTION TOPICAL
Refills: 0 | Status: DISCONTINUED | OUTPATIENT
Start: 2024-05-21 | End: 2024-05-21

## 2024-05-21 RX ORDER — B1/B2/B3/B5/B6/B12/VIT C/FOLIC 500-0.5 MG
1 TABLET ORAL AT BEDTIME
Refills: 0 | Status: DISCONTINUED | OUTPATIENT
Start: 2024-05-21 | End: 2024-09-28

## 2024-05-21 RX ORDER — MUPIROCIN 20 MG/G
1 OINTMENT TOPICAL
Qty: 0 | Refills: 0 | DISCHARGE
Start: 2024-05-21

## 2024-05-21 RX ORDER — FLUPHENAZINE HCL 10 MG
5 TABLET ORAL ONCE
Refills: 0 | Status: DISCONTINUED | OUTPATIENT
Start: 2024-05-21 | End: 2024-06-07

## 2024-05-21 RX ORDER — METOPROLOL SUCCINATE 50 MG/1
50 TABLET, EXTENDED RELEASE ORAL DAILY
Refills: 0 | Status: DISCONTINUED | OUTPATIENT
Start: 2024-05-21 | End: 2024-09-28

## 2024-05-21 RX ADMIN — MUPIROCIN 1 APPLICATION(S): 20 OINTMENT TOPICAL at 06:27

## 2024-05-21 RX ADMIN — CARBIDOPA AND LEVODOPA 1.5 TABLET(S): 25; 100 TABLET ORAL at 13:56

## 2024-05-21 RX ADMIN — Medication 1 TABLET(S): at 13:57

## 2024-05-21 RX ADMIN — ARIPIPRAZOLE 7.5 MILLIGRAM(S): 2 TABLET ORAL at 21:36

## 2024-05-21 RX ADMIN — Medication 2 TABLET(S): at 21:36

## 2024-05-21 RX ADMIN — Medication 20 MILLIGRAM(S): at 13:55

## 2024-05-21 RX ADMIN — Medication 50 MILLIGRAM(S): at 06:24

## 2024-05-21 RX ADMIN — Medication 75 MICROGRAM(S): at 06:24

## 2024-05-21 RX ADMIN — ARIPIPRAZOLE 5 MILLIGRAM(S): 15 TABLET ORAL at 13:57

## 2024-05-21 RX ADMIN — METFORMIN HYDROCHLORIDE 500 MILLIGRAM(S): 850 TABLET ORAL at 21:36

## 2024-05-21 RX ADMIN — CARBIDOPA AND LEVODOPA 1.5 TABLET(S): 25; 100 TABLET ORAL at 06:25

## 2024-05-21 RX ADMIN — Medication 1 MILLIGRAM(S): at 21:37

## 2024-05-21 RX ADMIN — Medication 1 MILLIGRAM(S): at 06:25

## 2024-05-21 RX ADMIN — DIVALPROEX SODIUM 750 MILLIGRAM(S): 500 TABLET, DELAYED RELEASE ORAL at 06:25

## 2024-05-21 RX ADMIN — Medication 1.5 TABLET(S): at 21:36

## 2024-05-21 RX ADMIN — Medication 750 MILLIGRAM(S): at 21:35

## 2024-05-21 RX ADMIN — Medication 40 MILLIGRAM(S): at 06:27

## 2024-05-21 RX ADMIN — LORATADINE 10 MILLIGRAM(S): 10 TABLET ORAL at 13:57

## 2024-05-21 RX ADMIN — Medication 1 TABLET(S): at 21:36

## 2024-05-21 RX ADMIN — Medication 1 APPLICATION(S): at 06:27
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
## 2024-05-21 NOTE — PROGRESS NOTE ADULT - TIME BILLING
I have spent a total of 51 minutes to prepare to see the patient, obtaining and reviewing history, physical examination, explaining the diagnosis, prognosis and treatment plan with the patient/family/caregiver. I also have spent the time ordering studies and testing, interpreting results, medicine reconciliation, subspecialty consultation and documentation as above.
I have spent a total of 51 minutes to prepare to see the patient, obtaining and reviewing history, physical examination, explaining the diagnosis, prognosis and treatment plan with the patient/family/caregiver. I also have spent the time ordering studies and testing, interpreting results, medicine reconciliation, subspecialty consultation and documentation as above.

## 2024-05-21 NOTE — DISCHARGE NOTE PROVIDER - HOSPITAL COURSE
73 yr old female with a PMH of vascular dementia, Parkinson's, retinopathy with macular edema, asthma, seizures, hypothyroidism, HLD, GERD, MDD, MI, malignant neoplasm of the bladder, anxiety, hydrocephalus, TERRY, schizophrenia, HTN who presents to the ED with swelling of her lower lip. Upon ED evaluation, patient reported that her symptoms began a few hours ago. The rest of the history is obtained from the aide that is bedside.  Per the aide, she was approached by the patient and asked if her lips were swelling.  At that time they noticed that her lips were indeed swollen. Pt was admitted d/t concern for angioedema; unclear trigger, but suspected possibly in setting of lisinopril. In the ED, pt received IV solumedrol 125 mg x 1. No concern for airway involvement. Thereafter, dose was downtitrated to 40 mg and later, 20 mg.     At the time of discharge, pt no longer noted to have any lip swelling. Plan to continue methylprednisolone 20 mg x 3 more days.

## 2024-05-21 NOTE — PROGRESS NOTE ADULT - PROBLEM SELECTOR PLAN 2
-Continue metoprolol CL 50mg daily   -hold lisinopril given angioedema  -BP acceptable , consider Amlodipine if needs better control
-Continue metoprolol CL 50mg daily   -hold lisinopril given angioedema  -BP acceptable , consider Amlodipine if needs better control
Chronic moderate exacerbation  Continue metoprolol CL 50mg daily   hold lisinopril given angioedema  BP acceptable , consider Amlodipine if needs better control

## 2024-05-21 NOTE — DISCHARGE NOTE PROVIDER - NSDCCPCAREPLAN_GEN_ALL_CORE_FT
PRINCIPAL DISCHARGE DIAGNOSIS  Diagnosis: Angioedema of lips  Assessment and Plan of Treatment: You came in with swelling of your lip. It is believed this likely occured due to one of your medications, lisinopril. Lisinopril is now listed as an allergy and you should never take this medication again. You were treated with steroids and improved.      SECONDARY DISCHARGE DIAGNOSES  Diagnosis: Schizophrenia  Assessment and Plan of Treatment: Continue abilify 5mg AM and 7.5mg PM, bentropine 1mg BID.    Diagnosis: Parkinson disease  Assessment and Plan of Treatment: Continue carbidopa/levadopa.    Diagnosis: T2DM (type 2 diabetes mellitus)  Assessment and Plan of Treatment:     Diagnosis: Benign essential HTN  Assessment and Plan of Treatment: Continue with metoprolol.

## 2024-05-21 NOTE — DISCHARGE NOTE PROVIDER - CARE PROVIDER_API CALL
Elio Benitez [facility]  Internal Medicine  [address] [place] [address]  Phone: [phone]  Fax: [phone]  Follow Up Time:

## 2024-05-21 NOTE — DISCHARGE NOTE NURSING/CASE MANAGEMENT/SOCIAL WORK - NSDCPEPT PROEDMA_GEN_ALL_CORE
Chemotherapy pump orders faxed to [facility] @ [phone] and call placed to [phone] and spoke with Mitchell Montez to confirm receipt. The pump will be administered in the home and de accessed in 46 hrs. Encouraged to call with questions/concerns.
Yes

## 2024-05-21 NOTE — BH CONSULTATION LIAISON PROGRESS NOTE - NSBHATTESTBILLING_PSY_A_CORE
62603-Hvahwxousi OBS or IP - moderate complexity OR 35-49 mins 60363-Wkvwroejed OBS or IP - high complexity OR 50-79 mins

## 2024-05-21 NOTE — PROGRESS NOTE ADULT - ASSESSMENT
73 yr old female presenting with swelling of her lower lip with concerns for angioedema. On methylprednisolone. 
73 yr old female presenting with swelling of her lower lip with concerns for angioedema. On methylprednisolone. 
73 yr old female presenting with swelling of her lower lip with concerns for angioedema

## 2024-05-21 NOTE — PROGRESS NOTE ADULT - PROBLEM SELECTOR PLAN 1
New  RR 16 satting 100% RA  No evidence of airway compromise   s/p solumedrol 125mg IV x1, benadryl 50mg IV x1, famotidine 20mg IV x1  Will continue with solumedrol 40mg IV daily and cetirizine 10mg daily until resolution of symptoms
-RR 16 satting 100% RA  -No evidence of airway compromise   -s/p solumedrol 125mg IV x1, benadryl 50mg IV x1, famotidine 20mg IV x1  -s/p solumedrol 40mg IV daily and cetirizine 10mg daily until resolution of symptoms. Taking PO meds, will change to methylprednisolone 40 mg PO daily.
-RR 16 satting 100% RA  -No evidence of airway compromise   -s/p solumedrol 125mg IV x1, benadryl 50mg IV x1, famotidine 20mg IV x1  -s/p solumedrol 40mg IV daily and cetirizine 10mg daily until resolution of symptoms. Taking PO meds, will change to methylprednisolone 40 mg PO on 5/20. Now on methylprednisolone 20 mg daily x 3 days

## 2024-05-21 NOTE — DISCHARGE NOTE NURSING/CASE MANAGEMENT/SOCIAL WORK - PATIENT PORTAL LINK FT
You can access the FollowMyHealth Patient Portal offered by [facility] by registering at the following website: [email or web address]. By joining [employer]’s FollowMyHealth portal, you will also be able to view your health information using other applications (apps) compatible with our system.

## 2024-05-21 NOTE — BH CONSULTATION LIAISON PROGRESS NOTE - NSBHCHARTREVIEWVS_PSY_A_CORE FT
Vital Signs Last 24 Hrs  T(C): 36.4 (20 May 2024 05:14), Max: 36.7 (19 May 2024 11:11)  T(F): 97.6 (20 May 2024 05:14), Max: 98 (19 May 2024 11:11)  HR: 78 (20 May 2024 05:14) (78 - 92)  BP: 117/81 (20 May 2024 05:14) (117/81 - 172/86)  BP(mean): --  RR: 18 (20 May 2024 05:14) (18 - 18)  SpO2: 99% (20 May 2024 05:14) (97% - 99%)    Parameters below as of 20 May 2024 05:14  Patient On (Oxygen Delivery Method): room air    
Vital Signs Last 24 Hrs  T(C): 36.8 (21 May 2024 05:00), Max: 36.8 (20 May 2024 20:20)  T(F): 98.2 (21 May 2024 05:00), Max: 98.2 (20 May 2024 20:20)  HR: 65 (21 May 2024 05:00) (65 - 85)  BP: 125/65 (21 May 2024 05:00) (113/76 - 125/65)  BP(mean): --  RR: 16 (21 May 2024 05:00) (16 - 17)  SpO2: 100% (21 May 2024 05:00) (98% - 100%)    Parameters below as of 21 May 2024 05:00  Patient On (Oxygen Delivery Method): room air

## 2024-05-21 NOTE — PROGRESS NOTE ADULT - PROBLEM SELECTOR PLAN 4
-Continue carbidopa/levodopa
Chronic stable  Continue carbidopa/levodopa
-Continue carbidopa/levodopa

## 2024-05-21 NOTE — PROGRESS NOTE ADULT - SUBJECTIVE AND OBJECTIVE BOX
Medicine Progress Note    Patient is a 73y old  Female who presents with a chief complaint of angioedema (18 May 2024 01:13)      SUBJECTIVE / OVERNIGHT EVENTS: no events , patient is confused     ADDITIONAL REVIEW OF SYSTEMS:    MEDICATIONS  (STANDING):  ammonium lactate 12% Lotion 1 Application(s) Topical two times a day  ARIPiprazole 5 milliGRAM(s) Oral daily  ARIPiprazole 7.5 milliGRAM(s) Oral at bedtime  benztropine 1 milliGRAM(s) Oral two times a day  carbidopa/levodopa  25/100 1.5 Tablet(s) Oral three times a day  dextrose 10% Bolus 125 milliLiter(s) IV Bolus once  dextrose 5%. 1000 milliLiter(s) (50 mL/Hr) IV Continuous <Continuous>  dextrose 5%. 1000 milliLiter(s) (100 mL/Hr) IV Continuous <Continuous>  dextrose 50% Injectable 25 Gram(s) IV Push once  dextrose 50% Injectable 12.5 Gram(s) IV Push once  divalproex Sprinkle 750 milliGRAM(s) Oral two times a day  enoxaparin Injectable 40 milliGRAM(s) SubCutaneous every 24 hours  glucagon  Injectable 1 milliGRAM(s) IntraMuscular once  insulin lispro (ADMELOG) corrective regimen sliding scale   SubCutaneous three times a day before meals  insulin lispro (ADMELOG) corrective regimen sliding scale   SubCutaneous at bedtime  levothyroxine 75 MICROGram(s) Oral daily  loratadine 10 milliGRAM(s) Oral daily  methylPREDNISolone sodium succinate Injectable 40 milliGRAM(s) IV Push daily  metoprolol succinate ER 50 milliGRAM(s) Oral daily  multivitamin 1 Tablet(s) Oral daily  senna 2 Tablet(s) Oral at bedtime    MEDICATIONS  (PRN):  acetaminophen     Tablet .. 650 milliGRAM(s) Oral every 6 hours PRN Temp greater or equal to 38C (100.4F), Mild Pain (1 - 3)  albuterol    90 MICROgram(s) HFA Inhaler 2 Puff(s) Inhalation every 6 hours PRN Shortness of Breath and/or Wheezing  aluminum hydroxide/magnesium hydroxide/simethicone Suspension 30 milliLiter(s) Oral every 4 hours PRN Dyspepsia  dextrose Oral Gel 15 Gram(s) Oral once PRN Blood Glucose LESS THAN 70 milliGRAM(s)/deciliter  fluPHENAZine  Injectable 2.5 milliGRAM(s) IntraMuscular every 6 hours PRN agitation  melatonin 3 milliGRAM(s) Oral at bedtime PRN Insomnia  ondansetron Injectable 4 milliGRAM(s) IV Push every 8 hours PRN Nausea and/or Vomiting    CAPILLARY BLOOD GLUCOSE      POCT Blood Glucose.: 221 mg/dL (19 May 2024 11:28)  POCT Blood Glucose.: 195 mg/dL (19 May 2024 07:37)  POCT Blood Glucose.: 229 mg/dL (18 May 2024 22:40)    I&O's Summary    18 May 2024 07:01  -  19 May 2024 07:00  --------------------------------------------------------  IN: 240 mL / OUT: 0 mL / NET: 240 mL        PHYSICAL EXAM:  Vital Signs Last 24 Hrs  T(C): 36.7 (19 May 2024 11:11), Max: 36.7 (18 May 2024 20:17)  T(F): 98 (19 May 2024 11:11), Max: 98.1 (18 May 2024 20:17)  HR: 80 (19 May 2024 11:11) (80 - 86)  BP: 141/79 (19 May 2024 11:11) (132/74 - 141/79)  BP(mean): --  RR: 18 (19 May 2024 11:11) (17 - 18)  SpO2: 98% (19 May 2024 11:11) (98% - 100%)    Parameters below as of 19 May 2024 11:11  Patient On (Oxygen Delivery Method): room air      CONSTITUTIONAL: NAD,   ENMT: Moist oral mucosa, no pharyngeal injection or exudates; lower lip less swelling   RESPIRATORY: Normal respiratory effort; lungs are clear to auscultation bilaterally  CARDIOVASCULAR: Regular rate and rhythm, normal S1 and S2; No lower extremity edema;  ABDOMEN: Nontender to palpation, normoactive bowel sounds, no rebound/guarding;   PSYCH: A+O to person,  NEUROLOGY: confused, fabulation , grossly non focal   SKIN: No rashes;     LABS:                        11.2   7.36  )-----------( 249      ( 19 May 2024 06:58 )             33.4     05-19    138  |  101  |  29<H>  ----------------------------<  193<H>  4.3   |  22  |  0.78    Ca    9.1      19 May 2024 06:58  Phos  2.8     05-19  Mg     2.00     05-19    TPro  7.6  /  Alb  3.8  /  TBili  <0.2  /  DBili  x   /  AST  47<H>  /  ALT  18  /  AlkPhos  96  05-17          Urinalysis Basic - ( 19 May 2024 06:58 )    Color: x / Appearance: x / SG: x / pH: x  Gluc: 193 mg/dL / Ketone: x  / Bili: x / Urobili: x   Blood: x / Protein: x / Nitrite: x   Leuk Esterase: x / RBC: x / WBC x   Sq Epi: x / Non Sq Epi: x / Bacteria: x        SARS-CoV-2: NotDetec (17 Jan 2024 11:10)      RADIOLOGY & ADDITIONAL TESTS:  Imaging from Last 24 Hours:    Electrocardiogram/QTc Interval:    COORDINATION OF CARE:  Care Discussed with Consultants/Other Providers: JOSIANE   
PROGRESS NOTE:   Authored by Dr. Bea Enriquez MD, pager [phone]     Patient is a 73y old  Female who presents with a chief complaint of angioedema (19 May 2024 12:44)      SUBJECTIVE / OVERNIGHT EVENTS:  Pt is sitting up in bed. Lower lip still swollen. Denies any SOB or difficultly swallowing. Tangential spech.     ADDITIONAL REVIEW OF SYSTEMS:    MEDICATIONS  (STANDING):  ammonium lactate 12% Lotion 1 Application(s) Topical two times a day  ARIPiprazole 5 milliGRAM(s) Oral daily  ARIPiprazole 7.5 milliGRAM(s) Oral at bedtime  benztropine 1 milliGRAM(s) Oral two times a day  carbidopa/levodopa  25/100 1.5 Tablet(s) Oral three times a day  dextrose 10% Bolus 125 milliLiter(s) IV Bolus once  dextrose 5%. 1000 milliLiter(s) (50 mL/Hr) IV Continuous <Continuous>  dextrose 5%. 1000 milliLiter(s) (100 mL/Hr) IV Continuous <Continuous>  dextrose 50% Injectable 12.5 Gram(s) IV Push once  dextrose 50% Injectable 25 Gram(s) IV Push once  divalproex Sprinkle 750 milliGRAM(s) Oral two times a day  enoxaparin Injectable 40 milliGRAM(s) SubCutaneous every 24 hours  glucagon  Injectable 1 milliGRAM(s) IntraMuscular once  insulin lispro (ADMELOG) corrective regimen sliding scale   SubCutaneous three times a day before meals  insulin lispro (ADMELOG) corrective regimen sliding scale   SubCutaneous at bedtime  levothyroxine 75 MICROGram(s) Oral daily  loratadine 10 milliGRAM(s) Oral daily  methylPREDNISolone 40 milliGRAM(s) Oral daily  metoprolol succinate ER 50 milliGRAM(s) Oral daily  multivitamin 1 Tablet(s) Oral daily  senna 2 Tablet(s) Oral at bedtime    MEDICATIONS  (PRN):  acetaminophen     Tablet .. 650 milliGRAM(s) Oral every 6 hours PRN Temp greater or equal to 38C (100.4F), Mild Pain (1 - 3)  albuterol    90 MICROgram(s) HFA Inhaler 2 Puff(s) Inhalation every 6 hours PRN Shortness of Breath and/or Wheezing  aluminum hydroxide/magnesium hydroxide/simethicone Suspension 30 milliLiter(s) Oral every 4 hours PRN Dyspepsia  dextrose Oral Gel 15 Gram(s) Oral once PRN Blood Glucose LESS THAN 70 milliGRAM(s)/deciliter  fluPHENAZine  Injectable 2.5 milliGRAM(s) IntraMuscular every 6 hours PRN agitation  melatonin 3 milliGRAM(s) Oral at bedtime PRN Insomnia  ondansetron Injectable 4 milliGRAM(s) IV Push every 8 hours PRN Nausea and/or Vomiting      CAPILLARY BLOOD GLUCOSE      POCT Blood Glucose.: 215 mg/dL (20 May 2024 12:38)  POCT Blood Glucose.: 180 mg/dL (20 May 2024 07:24)  POCT Blood Glucose.: 294 mg/dL (19 May 2024 22:08)  POCT Blood Glucose.: 211 mg/dL (19 May 2024 16:30)    I&O's Summary      PHYSICAL EXAM:  Vital Signs Last 24 Hrs  T(C): 36.4 (20 May 2024 05:14), Max: 36.4 (20 May 2024 05:14)  T(F): 97.6 (20 May 2024 05:14), Max: 97.6 (20 May 2024 05:14)  HR: 78 (20 May 2024 05:14) (78 - 92)  BP: 117/81 (20 May 2024 05:14) (117/81 - 172/86)  BP(mean): --  RR: 18 (20 May 2024 05:14) (18 - 18)  SpO2: 99% (20 May 2024 05:14) (97% - 99%)    Parameters below as of 20 May 2024 05:14  Patient On (Oxygen Delivery Method): room air        CONSTITUTIONAL: NAD, well-developed  HEENT: Still with some lower lip edema  RESPIRATORY: Normal respiratory effort; lungs are clear to auscultation bilaterally  CARDIOVASCULAR: Regular rate and rhythm, normal S1 and S2, no murmur/rub/gallop; No lower extremity edema; Peripheral pulses are 2+ bilaterally  ABDOMEN: Nontender to palpation, normoactive bowel sounds, no rebound/guarding; No hepatosplenomegaly  MUSCULOSKELETAL: no clubbing or cyanosis of digits; no joint swelling or tenderness to palpation  PSYCH: Irritable, delusional     LABS:                        10.9   7.12  )-----------( 241      ( 20 May 2024 04:58 )             32.9     05-20    139  |  104  |  24<H>  ----------------------------<  161<H>  4.0   |  24  |  0.72    Ca    9.5      20 May 2024 04:58  Phos  2.8     05-20  Mg     2.00     05-20            Urinalysis Basic - ( 20 May 2024 04:58 )    Color: x / Appearance: x / SG: x / pH: x  Gluc: 161 mg/dL / Ketone: x  / Bili: x / Urobili: x   Blood: x / Protein: x / Nitrite: x   Leuk Esterase: x / RBC: x / WBC x   Sq Epi: x / Non Sq Epi: x / Bacteria: x          RADIOLOGY & ADDITIONAL TESTS:  Results Reviewed:   Imaging Personally Reviewed:  Electrocardiogram Personally Reviewed:    COORDINATION OF CARE:  Care Discussed with Consultants/Other Providers [Y/N]:  Prior or Outpatient Records Reviewed [Y/N]:  
PROGRESS NOTE:   Authored by Dr. Bea Enriquez MD, pager [phone]     Patient is a 73y old  Female who presents with a chief complaint of angioedema (21 May 2024 12:13)      SUBJECTIVE / OVERNIGHT EVENTS:  Pt is seen ambulating unit. Endorses constipation. No longer feels her lip is swollen.     ADDITIONAL REVIEW OF SYSTEMS:  Unable to obtain     MEDICATIONS  (STANDING):  ammonium lactate 12% Lotion 1 Application(s) Topical two times a day  ARIPiprazole 5 milliGRAM(s) Oral daily  ARIPiprazole 7.5 milliGRAM(s) Oral at bedtime  benztropine 1 milliGRAM(s) Oral two times a day  carbidopa/levodopa  25/100 1.5 Tablet(s) Oral three times a day  chlorhexidine 2% Cloths 1 Application(s) Topical <User Schedule>  dextrose 10% Bolus 125 milliLiter(s) IV Bolus once  dextrose 5%. 1000 milliLiter(s) (50 mL/Hr) IV Continuous <Continuous>  dextrose 5%. 1000 milliLiter(s) (100 mL/Hr) IV Continuous <Continuous>  dextrose 50% Injectable 25 Gram(s) IV Push once  dextrose 50% Injectable 12.5 Gram(s) IV Push once  divalproex Sprinkle 750 milliGRAM(s) Oral two times a day  enoxaparin Injectable 40 milliGRAM(s) SubCutaneous every 24 hours  glucagon  Injectable 1 milliGRAM(s) IntraMuscular once  insulin lispro (ADMELOG) corrective regimen sliding scale   SubCutaneous three times a day before meals  insulin lispro (ADMELOG) corrective regimen sliding scale   SubCutaneous at bedtime  levothyroxine 75 MICROGram(s) Oral daily  loratadine 10 milliGRAM(s) Oral daily  methylPREDNISolone 20 milliGRAM(s) Oral daily  metoprolol succinate ER 50 milliGRAM(s) Oral daily  multivitamin 1 Tablet(s) Oral daily  mupirocin 2% Ointment 1 Application(s) Topical two times a day  senna 2 Tablet(s) Oral at bedtime    MEDICATIONS  (PRN):  acetaminophen     Tablet .. 650 milliGRAM(s) Oral every 6 hours PRN Temp greater or equal to 38C (100.4F), Mild Pain (1 - 3)  albuterol    90 MICROgram(s) HFA Inhaler 2 Puff(s) Inhalation every 6 hours PRN Shortness of Breath and/or Wheezing  aluminum hydroxide/magnesium hydroxide/simethicone Suspension 30 milliLiter(s) Oral every 4 hours PRN Dyspepsia  dextrose Oral Gel 15 Gram(s) Oral once PRN Blood Glucose LESS THAN 70 milliGRAM(s)/deciliter  fluPHENAZine  Injectable 2.5 milliGRAM(s) IntraMuscular every 6 hours PRN agitation  melatonin 3 milliGRAM(s) Oral at bedtime PRN Insomnia  ondansetron Injectable 4 milliGRAM(s) IV Push every 8 hours PRN Nausea and/or Vomiting      CAPILLARY BLOOD GLUCOSE      POCT Blood Glucose.: 107 mg/dL (21 May 2024 08:00)  POCT Blood Glucose.: 245 mg/dL (20 May 2024 21:59)  POCT Blood Glucose.: 174 mg/dL (20 May 2024 16:47)    I&O's Summary      PHYSICAL EXAM:  Vital Signs Last 24 Hrs  T(C): 36.8 (21 May 2024 05:00), Max: 36.8 (20 May 2024 20:20)  T(F): 98.2 (21 May 2024 05:00), Max: 98.2 (20 May 2024 20:20)  HR: 65 (21 May 2024 05:00) (65 - 85)  BP: 125/65 (21 May 2024 05:00) (113/76 - 125/65)  BP(mean): --  RR: 16 (21 May 2024 05:00) (16 - 17)  SpO2: 100% (21 May 2024 05:00) (98% - 100%)    Parameters below as of 21 May 2024 05:00  Patient On (Oxygen Delivery Method): room air        CONSTITUTIONAL: NAD, well-developed  HEENT: No lip edema  RESPIRATORY: Normal respiratory effort; lungs are clear to auscultation bilaterally  CARDIOVASCULAR: Regular rate and rhythm, normal S1 and S2, no murmur/rub/gallop; No lower extremity edema; Peripheral pulses are 2+ bilaterally  ABDOMEN: Nontender to palpation, normoactive bowel sounds, no rebound/guarding; No hepatosplenomegaly  MUSCULOSKELETAL: no clubbing or cyanosis of digits; no joint swelling or tenderness to palpation  PSYCH: Delusional and disorganized     LABS:                        11.5   7.48  )-----------( 239      ( 21 May 2024 04:25 )             35.2     05-21    137  |  103  |  26<H>  ----------------------------<  172<H>  4.5   |  21<L>  |  0.75    Ca    10.0      21 May 2024 04:25  Phos  3.5     05-21  Mg     2.00     05-21            Urinalysis Basic - ( 21 May 2024 04:25 )    Color: x / Appearance: x / SG: x / pH: x  Gluc: 172 mg/dL / Ketone: x  / Bili: x / Urobili: x   Blood: x / Protein: x / Nitrite: x   Leuk Esterase: x / RBC: x / WBC x   Sq Epi: x / Non Sq Epi: x / Bacteria: x          RADIOLOGY & ADDITIONAL TESTS:  Results Reviewed:   Imaging Personally Reviewed:  Electrocardiogram Personally Reviewed:    COORDINATION OF CARE:  Care Discussed with Consultants/Other Providers [Y/N]:  Prior or Outpatient Records Reviewed [Y/N]:

## 2024-05-21 NOTE — PROGRESS NOTE ADULT - PROBLEM SELECTOR PLAN 7
Chronic stable   Continue divalproex 750mg BID
-Continue divalproex 750mg BID
-Continue divalproex 750mg BID

## 2024-05-21 NOTE — DISCHARGE NOTE PROVIDER - NSDCMRMEDTOKEN_GEN_ALL_CORE_FT
albuterol 90 mcg/inh inhalation aerosol: 2 puff(s) inhaled every 6 hours As needed Shortness of Breath and/or Wheezing  ammonium lactate 12% topical lotion: 1 Apply topically to affected area 2 times a day  ARIPiprazole 5 mg oral tablet: 7.5 milligram(s) orally once a day (at bedtime)  ARIPiprazole 5 mg oral tablet: 1 tab(s) orally once a day  benztropine 1 mg oral tablet: 1 tab(s) orally 2 times a day  carbidopa-levodopa 25 mg-100 mg oral tablet: 1.5 tab(s) orally 3 times a day  Depakote Sprinkles 125 mg oral delayed release capsule: 750 milligram(s) orally 2 times a day  fluphenazine 5mg IM BID for refusal of medication per court order, agitation:   hydrocortisone 2.5% topical ointment: Apply topically to affected area 2 times a day  levothyroxine 75 mcg (0.075 mg) oral tablet: 1 tab(s) orally once a day  lisinopril 20 mg oral tablet: 1 tab(s) orally once a day  metFORMIN 500 mg oral tablet: 1 tab(s) orally 2 times a day  metoprolol succinate 50 mg oral tablet, extended release: 1 tab(s) orally once a day  Multiple Vitamins oral tablet: 1 tab(s) orally once a day  senna (sennosides) 8.6 mg oral tablet: 2 tab(s) orally once a day (at bedtime)   albuterol 90 mcg/inh inhalation aerosol: 2 puff(s) inhaled every 6 hours As needed Shortness of Breath and/or Wheezing  ammonium lactate 12% topical lotion: 1 Apply topically to affected area 2 times a day  ARIPiprazole: 7.5 milligram(s) orally once a day (at bedtime)  ARIPiprazole 5 mg oral tablet: 1 tab(s) orally once a day  benztropine 1 mg oral tablet: 1 tab(s) orally 2 times a day  carbidopa-levodopa 25 mg-100 mg oral tablet: 1.5 tab(s) orally 3 times a day  chlorhexidine 2% topical pad: Apply topically to affected area once a day 1 Apply topically to affected area  divalproex sodium 125 mg oral delayed release capsule: 6 cap(s) orally 2 times a day  levothyroxine 75 mcg (0.075 mg) oral tablet: 1 tab(s) orally once a day  loratadine 10 mg oral tablet: 1 tab(s) orally once a day  metFORMIN 500 mg oral tablet: 1 tab(s) orally 2 times a day  methylPREDNISolone 4 mg oral tablet: 5 tab(s) orally once a day  metoprolol succinate 50 mg oral tablet, extended release: 1 tab(s) orally once a day  Multiple Vitamins oral tablet: 1 tab(s) orally once a day  mupirocin 2% topical ointment: 1 Apply topically to affected area 2 times a day  senna leaf extract oral tablet: 2 tab(s) orally once a day (at bedtime)

## 2024-05-21 NOTE — PROGRESS NOTE ADULT - PROBLEM SELECTOR PLAN 6
-Hold metformin 500mg BID   -LDCS with diabetic diet
-Hold metformin 500mg BID   -LDCS with diabetic diet
Chronic stable  Hold metformin 500mg BID   LDCS with diabetic diet

## 2024-05-21 NOTE — PROGRESS NOTE ADULT - PROBLEM SELECTOR PLAN 3
Chronic - recently lost diane appeal and needs to take medications- fluphenazine PRN   Continue abilify 5mg AM and 7.5mg PM, bentropine 1mg BID
-recently lost court appeal and needs to take medications- fluphenazine PRN   -Continue abilify 5mg AM and 7.5mg PM, bentropine 1mg BID
-recently lost court appeal and needs to take medications- fluphenazine PRN   -Continue abilify 5mg AM and 7.5mg PM, bentropine 1mg BID

## 2024-05-21 NOTE — BH CONSULTATION LIAISON PROGRESS NOTE - CURRENT MEDICATION
MEDICATIONS  (STANDING):  ammonium lactate 12% Lotion 1 Application(s) Topical two times a day  ARIPiprazole 5 milliGRAM(s) Oral daily  ARIPiprazole 7.5 milliGRAM(s) Oral at bedtime  benztropine 1 milliGRAM(s) Oral two times a day  carbidopa/levodopa  25/100 1.5 Tablet(s) Oral three times a day  chlorhexidine 2% Cloths 1 Application(s) Topical <User Schedule>  dextrose 10% Bolus 125 milliLiter(s) IV Bolus once  dextrose 5%. 1000 milliLiter(s) (50 mL/Hr) IV Continuous <Continuous>  dextrose 5%. 1000 milliLiter(s) (100 mL/Hr) IV Continuous <Continuous>  dextrose 50% Injectable 12.5 Gram(s) IV Push once  dextrose 50% Injectable 25 Gram(s) IV Push once  divalproex Sprinkle 750 milliGRAM(s) Oral two times a day  enoxaparin Injectable 40 milliGRAM(s) SubCutaneous every 24 hours  glucagon  Injectable 1 milliGRAM(s) IntraMuscular once  insulin lispro (ADMELOG) corrective regimen sliding scale   SubCutaneous three times a day before meals  insulin lispro (ADMELOG) corrective regimen sliding scale   SubCutaneous at bedtime  levothyroxine 75 MICROGram(s) Oral daily  loratadine 10 milliGRAM(s) Oral daily  methylPREDNISolone 40 milliGRAM(s) Oral daily  metoprolol succinate ER 50 milliGRAM(s) Oral daily  multivitamin 1 Tablet(s) Oral daily  mupirocin 2% Ointment 1 Application(s) Topical two times a day  senna 2 Tablet(s) Oral at bedtime    MEDICATIONS  (PRN):  acetaminophen     Tablet .. 650 milliGRAM(s) Oral every 6 hours PRN Temp greater or equal to 38C (100.4F), Mild Pain (1 - 3)  albuterol    90 MICROgram(s) HFA Inhaler 2 Puff(s) Inhalation every 6 hours PRN Shortness of Breath and/or Wheezing  aluminum hydroxide/magnesium hydroxide/simethicone Suspension 30 milliLiter(s) Oral every 4 hours PRN Dyspepsia  dextrose Oral Gel 15 Gram(s) Oral once PRN Blood Glucose LESS THAN 70 milliGRAM(s)/deciliter  fluPHENAZine  Injectable 2.5 milliGRAM(s) IntraMuscular every 6 hours PRN agitation  melatonin 3 milliGRAM(s) Oral at bedtime PRN Insomnia  ondansetron Injectable 4 milliGRAM(s) IV Push every 8 hours PRN Nausea and/or Vomiting  
MEDICATIONS  (STANDING):  ammonium lactate 12% Lotion 1 Application(s) Topical two times a day  ARIPiprazole 5 milliGRAM(s) Oral daily  ARIPiprazole 7.5 milliGRAM(s) Oral at bedtime  benztropine 1 milliGRAM(s) Oral two times a day  carbidopa/levodopa  25/100 1.5 Tablet(s) Oral three times a day  dextrose 10% Bolus 125 milliLiter(s) IV Bolus once  dextrose 5%. 1000 milliLiter(s) (50 mL/Hr) IV Continuous <Continuous>  dextrose 5%. 1000 milliLiter(s) (100 mL/Hr) IV Continuous <Continuous>  dextrose 50% Injectable 25 Gram(s) IV Push once  dextrose 50% Injectable 12.5 Gram(s) IV Push once  divalproex Sprinkle 750 milliGRAM(s) Oral two times a day  enoxaparin Injectable 40 milliGRAM(s) SubCutaneous every 24 hours  glucagon  Injectable 1 milliGRAM(s) IntraMuscular once  insulin lispro (ADMELOG) corrective regimen sliding scale   SubCutaneous three times a day before meals  insulin lispro (ADMELOG) corrective regimen sliding scale   SubCutaneous at bedtime  levothyroxine 75 MICROGram(s) Oral daily  loratadine 10 milliGRAM(s) Oral daily  methylPREDNISolone sodium succinate Injectable 40 milliGRAM(s) IV Push daily  metoprolol succinate ER 50 milliGRAM(s) Oral daily  multivitamin 1 Tablet(s) Oral daily  senna 2 Tablet(s) Oral at bedtime    MEDICATIONS  (PRN):  acetaminophen     Tablet .. 650 milliGRAM(s) Oral every 6 hours PRN Temp greater or equal to 38C (100.4F), Mild Pain (1 - 3)  albuterol    90 MICROgram(s) HFA Inhaler 2 Puff(s) Inhalation every 6 hours PRN Shortness of Breath and/or Wheezing  aluminum hydroxide/magnesium hydroxide/simethicone Suspension 30 milliLiter(s) Oral every 4 hours PRN Dyspepsia  dextrose Oral Gel 15 Gram(s) Oral once PRN Blood Glucose LESS THAN 70 milliGRAM(s)/deciliter  fluPHENAZine  Injectable 2.5 milliGRAM(s) IntraMuscular every 6 hours PRN agitation  melatonin 3 milliGRAM(s) Oral at bedtime PRN Insomnia  ondansetron Injectable 4 milliGRAM(s) IV Push every 8 hours PRN Nausea and/or Vomiting

## 2024-05-21 NOTE — PROGRESS NOTE ADULT - PROBLEM SELECTOR PLAN 5
-Continue levothyroxine 75mcg daily
Chronic stable  Continue levothyroxine 75mcg daily
-Continue levothyroxine 75mcg daily

## 2024-05-21 NOTE — PROGRESS NOTE ADULT - PROBLEM SELECTOR PLAN 8
Lovenox
-DVT ppx: Lovenox  -Dispo: pending resolution of sxs
-DVT ppx: Lovenox  -Dispo: pending resolution of sxs

## 2024-05-22 DIAGNOSIS — F20.9 SCHIZOPHRENIA, UNSPECIFIED: ICD-10-CM

## 2024-05-22 DIAGNOSIS — E03.9 HYPOTHYROIDISM, UNSPECIFIED: ICD-10-CM

## 2024-05-22 DIAGNOSIS — G20 PARKINSON'S DISEASE: ICD-10-CM

## 2024-05-22 DIAGNOSIS — I10 ESSENTIAL (PRIMARY) HYPERTENSION: ICD-10-CM

## 2024-05-22 DIAGNOSIS — G40.909 EPILEPSY, UNSPECIFIED, NOT INTRACTABLE, WITHOUT STATUS EPILEPTICUS: ICD-10-CM

## 2024-05-22 DIAGNOSIS — T78.3XXA ANGIONEUROTIC EDEMA, INITIAL ENCOUNTER: ICD-10-CM

## 2024-05-22 DIAGNOSIS — E11.9 TYPE 2 DIABETES MELLITUS WITHOUT COMPLICATIONS: ICD-10-CM

## 2024-05-22 PROCEDURE — 99223 1ST HOSP IP/OBS HIGH 75: CPT

## 2024-05-22 PROCEDURE — 99232 SBSQ HOSP IP/OBS MODERATE 35: CPT

## 2024-05-22 RX ORDER — METFORMIN HYDROCHLORIDE 850 MG/1
850 TABLET ORAL
Refills: 0 | Status: DISCONTINUED | OUTPATIENT
Start: 2024-05-22 | End: 2024-05-30

## 2024-05-22 RX ORDER — DEXTROSE 50 % IN WATER 50 %
15 SYRINGE (ML) INTRAVENOUS ONCE
Refills: 0 | Status: DISCONTINUED | OUTPATIENT
Start: 2024-05-22 | End: 2024-09-28

## 2024-05-22 RX ORDER — INSULIN LISPRO 100 U/ML
INJECTION, SOLUTION INTRAVENOUS; SUBCUTANEOUS
Refills: 0 | Status: DISCONTINUED | OUTPATIENT
Start: 2024-05-22 | End: 2024-05-22

## 2024-05-22 RX ORDER — FLUPHENAZINE HCL 10 MG
5 TABLET ORAL ONCE
Refills: 0 | Status: COMPLETED | OUTPATIENT
Start: 2024-05-22 | End: 2024-05-22

## 2024-05-22 RX ORDER — ARIPIPRAZOLE 2 MG/1
10 TABLET ORAL AT BEDTIME
Refills: 0 | Status: DISCONTINUED | OUTPATIENT
Start: 2024-05-22 | End: 2024-05-23

## 2024-05-22 RX ORDER — FLUPHENAZINE HCL 10 MG
5 TABLET ORAL EVERY 6 HOURS
Refills: 0 | Status: DISCONTINUED | OUTPATIENT
Start: 2024-05-22 | End: 2024-06-07

## 2024-05-22 RX ADMIN — Medication 1 TABLET(S): at 20:52

## 2024-05-22 RX ADMIN — METFORMIN HYDROCHLORIDE 850 MILLIGRAM(S): 850 TABLET ORAL at 20:52

## 2024-05-22 RX ADMIN — Medication 2 TABLET(S): at 20:52

## 2024-05-22 RX ADMIN — ARIPIPRAZOLE 10 MILLIGRAM(S): 2 TABLET ORAL at 20:52

## 2024-05-22 RX ADMIN — Medication 750 MILLIGRAM(S): at 20:53

## 2024-05-22 RX ADMIN — Medication 5 MILLIGRAM(S): at 10:25

## 2024-05-22 RX ADMIN — Medication 1.5 TABLET(S): at 20:52

## 2024-05-22 RX ADMIN — Medication 1 MILLIGRAM(S): at 20:52

## 2024-05-22 RX ADMIN — Medication 75 MICROGRAM(S): at 06:09

## 2024-05-23 LAB — TRYPTASE SERPL-MCNC: 5.6 UG/L — SIGNIFICANT CHANGE UP

## 2024-05-23 PROCEDURE — 99232 SBSQ HOSP IP/OBS MODERATE 35: CPT

## 2024-05-23 RX ORDER — FLUPHENAZINE HCL 10 MG
5 TABLET ORAL ONCE
Refills: 0 | Status: COMPLETED | OUTPATIENT
Start: 2024-05-23 | End: 2024-05-23

## 2024-05-23 RX ORDER — ARIPIPRAZOLE 2 MG/1
15 TABLET ORAL DAILY
Refills: 0 | Status: DISCONTINUED | OUTPATIENT
Start: 2024-05-24 | End: 2024-05-24

## 2024-05-23 RX ORDER — ARIPIPRAZOLE 2 MG/1
10 TABLET ORAL AT BEDTIME
Refills: 0 | Status: COMPLETED | OUTPATIENT
Start: 2024-05-23 | End: 2024-05-23

## 2024-05-23 RX ADMIN — Medication 1 MILLIGRAM(S): at 10:27

## 2024-05-23 RX ADMIN — ARIPIPRAZOLE 5 MILLIGRAM(S): 2 TABLET ORAL at 10:27

## 2024-05-23 RX ADMIN — Medication 750 MILLIGRAM(S): at 10:26

## 2024-05-23 RX ADMIN — METHYLPREDNISOLONE ACETATE 20 MILLIGRAM(S): 80 INJECTION, SUSPENSION INTRA-ARTICULAR; INTRALESIONAL; INTRAMUSCULAR; SOFT TISSUE at 10:31

## 2024-05-23 RX ADMIN — Medication 75 MICROGRAM(S): at 05:44

## 2024-05-23 RX ADMIN — METFORMIN HYDROCHLORIDE 850 MILLIGRAM(S): 850 TABLET ORAL at 10:37

## 2024-05-23 RX ADMIN — Medication 5 MILLIGRAM(S): at 17:26

## 2024-05-23 RX ADMIN — Medication 1.5 TABLET(S): at 10:27

## 2024-05-23 RX ADMIN — Medication 400 UNIT(S): at 10:26

## 2024-05-24 LAB
GLUCOSE BLDC GLUCOMTR-MCNC: 169 MG/DL — HIGH (ref 70–99)
GLUCOSE BLDC GLUCOMTR-MCNC: 258 MG/DL — HIGH (ref 70–99)
GLUCOSE BLDC GLUCOMTR-MCNC: 360 MG/DL — HIGH (ref 70–99)

## 2024-05-24 PROCEDURE — 99232 SBSQ HOSP IP/OBS MODERATE 35: CPT

## 2024-05-24 RX ORDER — SODIUM CHLORIDE 9 G/1000ML
1000 INJECTION, SOLUTION INTRAVENOUS
Refills: 0 | Status: DISCONTINUED | OUTPATIENT
Start: 2024-05-24 | End: 2024-06-01

## 2024-05-24 RX ORDER — INSULIN LISPRO 100 U/ML
INJECTION, SOLUTION INTRAVENOUS; SUBCUTANEOUS AT BEDTIME
Refills: 0 | Status: DISCONTINUED | OUTPATIENT
Start: 2024-05-24 | End: 2024-06-04

## 2024-05-24 RX ORDER — DEXTROSE 50 % IN WATER 50 %
12.5 SYRINGE (ML) INTRAVENOUS ONCE
Refills: 0 | Status: DISCONTINUED | OUTPATIENT
Start: 2024-05-24 | End: 2024-06-01

## 2024-05-24 RX ORDER — DEXTROSE MONOHYDRATE 100 G/1000ML
125 INJECTION, SOLUTION INTRAVENOUS ONCE
Refills: 0 | Status: DISCONTINUED | OUTPATIENT
Start: 2024-05-24 | End: 2024-06-01

## 2024-05-24 RX ORDER — DEXTROSE 50 % IN WATER 50 %
25 SYRINGE (ML) INTRAVENOUS ONCE
Refills: 0 | Status: DISCONTINUED | OUTPATIENT
Start: 2024-05-24 | End: 2024-06-01

## 2024-05-24 RX ORDER — ARIPIPRAZOLE 2 MG/1
20 TABLET ORAL DAILY
Refills: 0 | Status: DISCONTINUED | OUTPATIENT
Start: 2024-05-24 | End: 2024-05-28

## 2024-05-24 RX ORDER — INSULIN LISPRO 100 U/ML
INJECTION, SOLUTION INTRAVENOUS; SUBCUTANEOUS
Refills: 0 | Status: DISCONTINUED | OUTPATIENT
Start: 2024-05-24 | End: 2024-06-04

## 2024-05-24 RX ORDER — GLUCAGON 3 MG/1
1 POWDER NASAL ONCE
Refills: 0 | Status: DISCONTINUED | OUTPATIENT
Start: 2024-05-24 | End: 2024-09-28

## 2024-05-24 RX ADMIN — Medication 1.5 TABLET(S): at 21:34

## 2024-05-24 RX ADMIN — Medication 2 TABLET(S): at 21:35

## 2024-05-24 RX ADMIN — INSULIN LISPRO 2: 100 INJECTION, SOLUTION INTRAVENOUS; SUBCUTANEOUS at 21:33

## 2024-05-24 RX ADMIN — Medication 1 MILLIGRAM(S): at 21:35

## 2024-05-24 RX ADMIN — Medication 750 MILLIGRAM(S): at 21:34

## 2024-05-24 RX ADMIN — Medication 1 TABLET(S): at 21:35

## 2024-05-24 RX ADMIN — METFORMIN HYDROCHLORIDE 850 MILLIGRAM(S): 850 TABLET ORAL at 21:35

## 2024-05-25 LAB
GLUCOSE BLDC GLUCOMTR-MCNC: 172 MG/DL — HIGH (ref 70–99)
GLUCOSE BLDC GLUCOMTR-MCNC: 201 MG/DL — HIGH (ref 70–99)
GLUCOSE BLDC GLUCOMTR-MCNC: 333 MG/DL — HIGH (ref 70–99)

## 2024-05-25 PROCEDURE — 99231 SBSQ HOSP IP/OBS SF/LOW 25: CPT

## 2024-05-25 RX ADMIN — Medication 1 MILLIGRAM(S): at 20:38

## 2024-05-25 RX ADMIN — INSULIN LISPRO 8: 100 INJECTION, SOLUTION INTRAVENOUS; SUBCUTANEOUS at 16:59

## 2024-05-25 RX ADMIN — METFORMIN HYDROCHLORIDE 850 MILLIGRAM(S): 850 TABLET ORAL at 20:38

## 2024-05-25 RX ADMIN — Medication 1.5 TABLET(S): at 20:38

## 2024-05-25 RX ADMIN — Medication 1 TABLET(S): at 20:38

## 2024-05-25 RX ADMIN — Medication 750 MILLIGRAM(S): at 20:38

## 2024-05-25 RX ADMIN — Medication 75 MICROGRAM(S): at 06:04

## 2024-05-25 RX ADMIN — Medication 2 TABLET(S): at 20:38

## 2024-05-26 LAB
GLUCOSE BLDC GLUCOMTR-MCNC: 212 MG/DL — HIGH (ref 70–99)
GLUCOSE BLDC GLUCOMTR-MCNC: 265 MG/DL — HIGH (ref 70–99)

## 2024-05-26 PROCEDURE — 99231 SBSQ HOSP IP/OBS SF/LOW 25: CPT

## 2024-05-26 RX ORDER — FLUPHENAZINE HCL 10 MG
5 TABLET ORAL ONCE
Refills: 0 | Status: COMPLETED | OUTPATIENT
Start: 2024-05-26 | End: 2024-05-26

## 2024-05-26 RX ADMIN — Medication 1.5 TABLET(S): at 12:05

## 2024-05-26 RX ADMIN — Medication 2 TABLET(S): at 21:23

## 2024-05-26 RX ADMIN — Medication 1 TABLET(S): at 21:23

## 2024-05-26 RX ADMIN — Medication 5 MILLIGRAM(S): at 19:02

## 2024-05-26 RX ADMIN — Medication 400 UNIT(S): at 09:04

## 2024-05-26 RX ADMIN — ARIPIPRAZOLE 20 MILLIGRAM(S): 2 TABLET ORAL at 09:04

## 2024-05-26 RX ADMIN — Medication 1.5 TABLET(S): at 21:23

## 2024-05-26 RX ADMIN — METFORMIN HYDROCHLORIDE 850 MILLIGRAM(S): 850 TABLET ORAL at 21:23

## 2024-05-26 RX ADMIN — Medication 1.5 TABLET(S): at 09:03

## 2024-05-26 RX ADMIN — METFORMIN HYDROCHLORIDE 850 MILLIGRAM(S): 850 TABLET ORAL at 09:04

## 2024-05-26 RX ADMIN — LORATADINE 10 MILLIGRAM(S): 5 SOLUTION ORAL at 09:04

## 2024-05-26 RX ADMIN — Medication 75 MICROGRAM(S): at 06:13

## 2024-05-26 RX ADMIN — Medication 1 MILLIGRAM(S): at 21:22

## 2024-05-26 RX ADMIN — METOPROLOL SUCCINATE 50 MILLIGRAM(S): 50 TABLET, EXTENDED RELEASE ORAL at 09:04

## 2024-05-26 RX ADMIN — Medication 750 MILLIGRAM(S): at 21:24

## 2024-05-26 RX ADMIN — Medication 1 MILLIGRAM(S): at 09:04

## 2024-05-26 RX ADMIN — Medication 750 MILLIGRAM(S): at 09:02

## 2024-05-27 PROCEDURE — 99231 SBSQ HOSP IP/OBS SF/LOW 25: CPT

## 2024-05-27 RX ADMIN — Medication 75 MICROGRAM(S): at 06:43

## 2024-05-27 RX ADMIN — Medication 1 MILLIGRAM(S): at 20:13

## 2024-05-27 RX ADMIN — Medication 1 MILLIGRAM(S): at 08:51

## 2024-05-27 RX ADMIN — Medication 1.5 TABLET(S): at 20:14

## 2024-05-27 RX ADMIN — ARIPIPRAZOLE 20 MILLIGRAM(S): 2 TABLET ORAL at 08:51

## 2024-05-27 RX ADMIN — Medication 1.5 TABLET(S): at 08:51

## 2024-05-27 RX ADMIN — Medication 2 TABLET(S): at 20:13

## 2024-05-27 RX ADMIN — Medication 750 MILLIGRAM(S): at 08:51

## 2024-05-27 RX ADMIN — Medication 1.5 TABLET(S): at 12:45

## 2024-05-27 RX ADMIN — Medication 750 MILLIGRAM(S): at 20:13

## 2024-05-27 RX ADMIN — METOPROLOL SUCCINATE 50 MILLIGRAM(S): 50 TABLET, EXTENDED RELEASE ORAL at 08:51

## 2024-05-27 RX ADMIN — METFORMIN HYDROCHLORIDE 850 MILLIGRAM(S): 850 TABLET ORAL at 08:50

## 2024-05-27 RX ADMIN — Medication 400 UNIT(S): at 08:50

## 2024-05-27 RX ADMIN — METFORMIN HYDROCHLORIDE 850 MILLIGRAM(S): 850 TABLET ORAL at 20:13

## 2024-05-27 RX ADMIN — LORATADINE 10 MILLIGRAM(S): 5 SOLUTION ORAL at 08:50

## 2024-05-27 RX ADMIN — Medication 1 TABLET(S): at 20:13

## 2024-05-28 LAB
GLUCOSE BLDC GLUCOMTR-MCNC: 173 MG/DL — HIGH (ref 70–99)
GLUCOSE BLDC GLUCOMTR-MCNC: 220 MG/DL — HIGH (ref 70–99)

## 2024-05-28 RX ORDER — ARIPIPRAZOLE 2 MG/1
25 TABLET ORAL DAILY
Refills: 0 | Status: DISCONTINUED | OUTPATIENT
Start: 2024-05-28 | End: 2024-05-30

## 2024-05-28 RX ADMIN — Medication 1 MILLIGRAM(S): at 20:48

## 2024-05-28 RX ADMIN — METOPROLOL SUCCINATE 50 MILLIGRAM(S): 50 TABLET, EXTENDED RELEASE ORAL at 09:53

## 2024-05-28 RX ADMIN — LORATADINE 10 MILLIGRAM(S): 5 SOLUTION ORAL at 09:53

## 2024-05-28 RX ADMIN — ARIPIPRAZOLE 20 MILLIGRAM(S): 2 TABLET ORAL at 09:53

## 2024-05-28 RX ADMIN — Medication 750 MILLIGRAM(S): at 09:52

## 2024-05-28 RX ADMIN — METFORMIN HYDROCHLORIDE 850 MILLIGRAM(S): 850 TABLET ORAL at 09:53

## 2024-05-28 RX ADMIN — Medication 1 TABLET(S): at 20:49

## 2024-05-28 RX ADMIN — Medication 75 MICROGRAM(S): at 06:27

## 2024-05-28 RX ADMIN — METFORMIN HYDROCHLORIDE 850 MILLIGRAM(S): 850 TABLET ORAL at 20:47

## 2024-05-28 RX ADMIN — Medication 750 MILLIGRAM(S): at 20:45

## 2024-05-28 RX ADMIN — Medication 1.5 TABLET(S): at 20:46

## 2024-05-28 RX ADMIN — Medication 1.5 TABLET(S): at 09:53

## 2024-05-28 RX ADMIN — Medication 400 UNIT(S): at 09:52

## 2024-05-28 RX ADMIN — Medication 1 MILLIGRAM(S): at 09:53

## 2024-05-29 LAB — GLUCOSE BLDC GLUCOMTR-MCNC: 213 MG/DL — HIGH (ref 70–99)

## 2024-05-29 PROCEDURE — 99232 SBSQ HOSP IP/OBS MODERATE 35: CPT

## 2024-05-29 RX ADMIN — Medication 750 MILLIGRAM(S): at 13:58

## 2024-05-29 RX ADMIN — METFORMIN HYDROCHLORIDE 850 MILLIGRAM(S): 850 TABLET ORAL at 22:21

## 2024-05-29 RX ADMIN — Medication 1 MILLIGRAM(S): at 13:56

## 2024-05-29 RX ADMIN — Medication 1.5 TABLET(S): at 13:59

## 2024-05-29 RX ADMIN — Medication 750 MILLIGRAM(S): at 22:21

## 2024-05-29 RX ADMIN — Medication 1 MILLIGRAM(S): at 22:21

## 2024-05-29 RX ADMIN — METFORMIN HYDROCHLORIDE 850 MILLIGRAM(S): 850 TABLET ORAL at 13:59

## 2024-05-29 RX ADMIN — ARIPIPRAZOLE 25 MILLIGRAM(S): 2 TABLET ORAL at 14:02

## 2024-05-29 RX ADMIN — Medication 1 TABLET(S): at 22:22

## 2024-05-29 RX ADMIN — Medication 400 UNIT(S): at 13:58

## 2024-05-29 RX ADMIN — Medication 75 MICROGRAM(S): at 06:19

## 2024-05-30 PROCEDURE — 99232 SBSQ HOSP IP/OBS MODERATE 35: CPT

## 2024-05-30 RX ORDER — METFORMIN HYDROCHLORIDE 850 MG/1
1000 TABLET ORAL
Refills: 0 | Status: DISCONTINUED | OUTPATIENT
Start: 2024-05-30 | End: 2024-09-28

## 2024-05-30 RX ORDER — FLUPHENAZINE HCL 10 MG
5 TABLET ORAL ONCE
Refills: 0 | Status: COMPLETED | OUTPATIENT
Start: 2024-05-30 | End: 2024-05-30

## 2024-05-30 RX ORDER — ARIPIPRAZOLE 2 MG/1
30 TABLET ORAL DAILY
Refills: 0 | Status: DISCONTINUED | OUTPATIENT
Start: 2024-05-30 | End: 2024-06-03

## 2024-05-30 RX ADMIN — Medication 5 MILLIGRAM(S): at 13:30

## 2024-05-30 RX ADMIN — Medication 1 MILLIGRAM(S): at 10:00

## 2024-05-30 RX ADMIN — Medication 1.5 TABLET(S): at 10:02

## 2024-05-30 RX ADMIN — METFORMIN HYDROCHLORIDE 1000 MILLIGRAM(S): 850 TABLET ORAL at 10:01

## 2024-05-30 RX ADMIN — ARIPIPRAZOLE 25 MILLIGRAM(S): 2 TABLET ORAL at 10:01

## 2024-05-30 RX ADMIN — Medication 750 MILLIGRAM(S): at 10:02

## 2024-05-30 RX ADMIN — Medication 5 MILLIGRAM(S): at 22:06

## 2024-05-30 RX ADMIN — Medication 1.5 TABLET(S): at 13:18

## 2024-05-30 RX ADMIN — LORATADINE 10 MILLIGRAM(S): 5 SOLUTION ORAL at 10:03

## 2024-05-30 RX ADMIN — Medication 400 UNIT(S): at 10:02

## 2024-05-31 PROCEDURE — 99232 SBSQ HOSP IP/OBS MODERATE 35: CPT

## 2024-05-31 RX ORDER — LITHIUM CARBONATE 600 MG/1
300 CAPSULE, GELATIN COATED ORAL AT BEDTIME
Refills: 0 | Status: DISCONTINUED | OUTPATIENT
Start: 2024-05-31 | End: 2024-06-07

## 2024-05-31 RX ORDER — FLUPHENAZINE HCL 10 MG
5 TABLET ORAL ONCE
Refills: 0 | Status: COMPLETED | OUTPATIENT
Start: 2024-05-31 | End: 2024-06-01

## 2024-05-31 RX ADMIN — Medication 1 TABLET(S): at 21:12

## 2024-05-31 RX ADMIN — METFORMIN HYDROCHLORIDE 1000 MILLIGRAM(S): 850 TABLET ORAL at 21:12

## 2024-05-31 RX ADMIN — Medication 1 MILLIGRAM(S): at 21:13

## 2024-05-31 RX ADMIN — Medication 750 MILLIGRAM(S): at 21:12

## 2024-05-31 RX ADMIN — Medication 1.5 TABLET(S): at 21:12

## 2024-05-31 RX ADMIN — Medication 1.5 TABLET(S): at 17:10

## 2024-05-31 RX ADMIN — LITHIUM CARBONATE 300 MILLIGRAM(S): 600 CAPSULE, GELATIN COATED ORAL at 21:13

## 2024-06-01 PROCEDURE — 99231 SBSQ HOSP IP/OBS SF/LOW 25: CPT

## 2024-06-01 RX ADMIN — Medication 400 UNIT(S): at 10:41

## 2024-06-01 RX ADMIN — Medication 2 TABLET(S): at 20:22

## 2024-06-01 RX ADMIN — Medication 750 MILLIGRAM(S): at 20:21

## 2024-06-01 RX ADMIN — METOPROLOL SUCCINATE 50 MILLIGRAM(S): 50 TABLET, EXTENDED RELEASE ORAL at 10:40

## 2024-06-01 RX ADMIN — Medication 1 MILLIGRAM(S): at 10:39

## 2024-06-01 RX ADMIN — Medication 1.5 TABLET(S): at 13:22

## 2024-06-01 RX ADMIN — METFORMIN HYDROCHLORIDE 1000 MILLIGRAM(S): 850 TABLET ORAL at 10:40

## 2024-06-01 RX ADMIN — METFORMIN HYDROCHLORIDE 1000 MILLIGRAM(S): 850 TABLET ORAL at 20:22

## 2024-06-01 RX ADMIN — Medication 5 MILLIGRAM(S): at 17:32

## 2024-06-01 RX ADMIN — Medication 1 TABLET(S): at 20:21

## 2024-06-01 RX ADMIN — Medication 1.5 TABLET(S): at 20:21

## 2024-06-01 RX ADMIN — Medication 1 MILLIGRAM(S): at 20:21

## 2024-06-01 RX ADMIN — ARIPIPRAZOLE 30 MILLIGRAM(S): 2 TABLET ORAL at 10:40

## 2024-06-01 RX ADMIN — Medication 75 MICROGRAM(S): at 06:17

## 2024-06-01 RX ADMIN — Medication 1.5 TABLET(S): at 10:39

## 2024-06-01 RX ADMIN — Medication 750 MILLIGRAM(S): at 10:39

## 2024-06-02 PROCEDURE — 99232 SBSQ HOSP IP/OBS MODERATE 35: CPT

## 2024-06-02 RX ORDER — LORAZEPAM 4 MG/ML
0.5 VIAL (ML) INJECTION ONCE
Refills: 0 | Status: DISCONTINUED | OUTPATIENT
Start: 2024-06-02 | End: 2024-06-07

## 2024-06-02 RX ADMIN — Medication 750 MILLIGRAM(S): at 10:06

## 2024-06-02 RX ADMIN — METOPROLOL SUCCINATE 50 MILLIGRAM(S): 50 TABLET, EXTENDED RELEASE ORAL at 10:07

## 2024-06-02 RX ADMIN — Medication 1 MILLIGRAM(S): at 10:07

## 2024-06-02 RX ADMIN — Medication 1.5 TABLET(S): at 13:48

## 2024-06-02 RX ADMIN — ARIPIPRAZOLE 30 MILLIGRAM(S): 2 TABLET ORAL at 10:07

## 2024-06-02 RX ADMIN — Medication 75 MICROGRAM(S): at 06:21

## 2024-06-02 RX ADMIN — METFORMIN HYDROCHLORIDE 1000 MILLIGRAM(S): 850 TABLET ORAL at 10:07

## 2024-06-02 RX ADMIN — Medication 400 UNIT(S): at 10:07

## 2024-06-02 RX ADMIN — Medication 1.5 TABLET(S): at 10:07

## 2024-06-02 RX ADMIN — Medication 5 MILLIGRAM(S): at 13:48

## 2024-06-02 NOTE — PATIENT PROFILE ADULT - DOMESTIC TRAVEL HIGH RISK QUESTION
Prednisone to decrease inflammation and let your back heal    Tizanidine - muscle relaxers/pain medication    Avoid strenuous activities    Follow-up with your doctor this upcoming week    Return for any further issues or concerns  
Unable to Assess

## 2024-06-03 PROCEDURE — 99232 SBSQ HOSP IP/OBS MODERATE 35: CPT

## 2024-06-03 RX ORDER — ARIPIPRAZOLE 2 MG/1
30 TABLET ORAL DAILY
Refills: 0 | Status: DISCONTINUED | OUTPATIENT
Start: 2024-06-03 | End: 2024-06-07

## 2024-06-03 RX ADMIN — Medication 75 MICROGRAM(S): at 06:10

## 2024-06-04 PROCEDURE — 99231 SBSQ HOSP IP/OBS SF/LOW 25: CPT

## 2024-06-04 RX ADMIN — Medication 75 MICROGRAM(S): at 06:22

## 2024-06-05 PROCEDURE — 99231 SBSQ HOSP IP/OBS SF/LOW 25: CPT

## 2024-06-06 PROCEDURE — 99232 SBSQ HOSP IP/OBS MODERATE 35: CPT

## 2024-06-07 PROCEDURE — 99232 SBSQ HOSP IP/OBS MODERATE 35: CPT

## 2024-06-07 RX ORDER — OLANZAPINE 10 MG/1
5 TABLET ORAL ONCE
Refills: 0 | Status: DISCONTINUED | OUTPATIENT
Start: 2024-06-07 | End: 2024-06-12

## 2024-06-07 RX ORDER — OLANZAPINE 10 MG/1
5 TABLET ORAL EVERY 6 HOURS
Refills: 0 | Status: DISCONTINUED | OUTPATIENT
Start: 2024-06-07 | End: 2024-06-12

## 2024-06-07 RX ADMIN — Medication 1.5 TABLET(S): at 21:29

## 2024-06-07 RX ADMIN — Medication 2 TABLET(S): at 21:28

## 2024-06-07 RX ADMIN — Medication 1 TABLET(S): at 21:28

## 2024-06-07 RX ADMIN — Medication 750 MILLIGRAM(S): at 21:28

## 2024-06-07 RX ADMIN — METFORMIN HYDROCHLORIDE 1000 MILLIGRAM(S): 850 TABLET ORAL at 21:28

## 2024-06-07 RX ADMIN — Medication 1 MILLIGRAM(S): at 21:28

## 2024-06-08 PROCEDURE — 99231 SBSQ HOSP IP/OBS SF/LOW 25: CPT

## 2024-06-08 RX ADMIN — Medication 1.5 TABLET(S): at 07:24

## 2024-06-08 RX ADMIN — Medication 2 TABLET(S): at 21:35

## 2024-06-08 RX ADMIN — Medication 1 MILLIGRAM(S): at 07:23

## 2024-06-08 RX ADMIN — METFORMIN HYDROCHLORIDE 1000 MILLIGRAM(S): 850 TABLET ORAL at 21:34

## 2024-06-08 RX ADMIN — Medication 750 MILLIGRAM(S): at 07:25

## 2024-06-08 RX ADMIN — METFORMIN HYDROCHLORIDE 1000 MILLIGRAM(S): 850 TABLET ORAL at 07:23

## 2024-06-08 RX ADMIN — Medication 1 MILLIGRAM(S): at 21:34

## 2024-06-08 RX ADMIN — Medication 1 TABLET(S): at 21:34

## 2024-06-08 RX ADMIN — Medication 400 UNIT(S): at 07:25

## 2024-06-08 RX ADMIN — Medication 1.5 TABLET(S): at 21:34

## 2024-06-08 RX ADMIN — Medication 750 MILLIGRAM(S): at 21:33

## 2024-06-08 RX ADMIN — METOPROLOL SUCCINATE 50 MILLIGRAM(S): 50 TABLET, EXTENDED RELEASE ORAL at 07:25

## 2024-06-08 RX ADMIN — Medication 1.5 TABLET(S): at 13:01

## 2024-06-09 PROCEDURE — 99231 SBSQ HOSP IP/OBS SF/LOW 25: CPT

## 2024-06-09 RX ORDER — DIPHENHYDRAMINE HCL 12.5MG/5ML
50 ELIXIR ORAL ONCE
Refills: 0 | Status: COMPLETED | OUTPATIENT
Start: 2024-06-09 | End: 2024-06-09

## 2024-06-09 RX ORDER — OLANZAPINE 10 MG/1
5 TABLET ORAL ONCE
Refills: 0 | Status: COMPLETED | OUTPATIENT
Start: 2024-06-09 | End: 2024-06-09

## 2024-06-09 RX ADMIN — Medication 50 MILLIGRAM(S): at 23:41

## 2024-06-09 RX ADMIN — OLANZAPINE 5 MILLIGRAM(S): 10 TABLET ORAL at 23:41

## 2024-06-09 RX ADMIN — Medication 75 MICROGRAM(S): at 06:46

## 2024-06-10 PROCEDURE — 99232 SBSQ HOSP IP/OBS MODERATE 35: CPT

## 2024-06-10 RX ORDER — OLANZAPINE 10 MG/1
5 TABLET ORAL ONCE
Refills: 0 | Status: COMPLETED | OUTPATIENT
Start: 2024-06-10 | End: 2024-06-10

## 2024-06-10 RX ORDER — DIPHENHYDRAMINE HCL 12.5MG/5ML
25 ELIXIR ORAL ONCE
Refills: 0 | Status: COMPLETED | OUTPATIENT
Start: 2024-06-10 | End: 2024-06-10

## 2024-06-10 RX ORDER — INSULIN LISPRO 100 U/ML
INJECTION, SOLUTION INTRAVENOUS; SUBCUTANEOUS
Refills: 0 | Status: DISCONTINUED | OUTPATIENT
Start: 2024-06-10 | End: 2024-09-25

## 2024-06-10 RX ADMIN — Medication 25 MILLIGRAM(S): at 12:20

## 2024-06-10 RX ADMIN — OLANZAPINE 5 MILLIGRAM(S): 10 TABLET ORAL at 12:20

## 2024-06-11 PROCEDURE — 99232 SBSQ HOSP IP/OBS MODERATE 35: CPT

## 2024-06-11 RX ORDER — OLANZAPINE 10 MG/1
5 TABLET ORAL ONCE
Refills: 0 | Status: DISCONTINUED | OUTPATIENT
Start: 2024-06-11 | End: 2024-06-12

## 2024-06-11 RX ORDER — RISPERIDONE 4 MG
1 TABLET ORAL AT BEDTIME
Refills: 0 | Status: DISCONTINUED | OUTPATIENT
Start: 2024-06-11 | End: 2024-06-13

## 2024-06-11 RX ADMIN — Medication 1 TABLET(S): at 21:03

## 2024-06-11 RX ADMIN — METFORMIN HYDROCHLORIDE 1000 MILLIGRAM(S): 850 TABLET ORAL at 21:02

## 2024-06-11 RX ADMIN — Medication 750 MILLIGRAM(S): at 21:01

## 2024-06-11 RX ADMIN — Medication 1.5 TABLET(S): at 21:02

## 2024-06-11 RX ADMIN — Medication 1 MILLIGRAM(S): at 21:03

## 2024-06-11 RX ADMIN — Medication 1 MILLIGRAM(S): at 21:02

## 2024-06-11 RX ADMIN — Medication 2 TABLET(S): at 21:02

## 2024-06-12 PROCEDURE — 99232 SBSQ HOSP IP/OBS MODERATE 35: CPT

## 2024-06-12 RX ORDER — FLUPHENAZINE HCL 10 MG
5 TABLET ORAL AT BEDTIME
Refills: 0 | Status: DISCONTINUED | OUTPATIENT
Start: 2024-06-12 | End: 2024-06-17

## 2024-06-12 RX ORDER — DIPHENHYDRAMINE HCL 12.5MG/5ML
25 ELIXIR ORAL AT BEDTIME
Refills: 0 | Status: DISCONTINUED | OUTPATIENT
Start: 2024-06-12 | End: 2024-06-17

## 2024-06-12 RX ORDER — FLUPHENAZINE HCL 10 MG
5 TABLET ORAL ONCE
Refills: 0 | Status: COMPLETED | OUTPATIENT
Start: 2024-06-12 | End: 2024-06-12

## 2024-06-12 RX ORDER — FLUPHENAZINE HCL 10 MG
5 TABLET ORAL ONCE
Refills: 0 | Status: DISCONTINUED | OUTPATIENT
Start: 2024-06-12 | End: 2024-06-12

## 2024-06-12 RX ORDER — OLANZAPINE 10 MG/1
5 TABLET ORAL ONCE
Refills: 0 | Status: COMPLETED | OUTPATIENT
Start: 2024-06-12 | End: 2024-06-12

## 2024-06-12 RX ORDER — DIPHENHYDRAMINE HCL 12.5MG/5ML
25 ELIXIR ORAL ONCE
Refills: 0 | Status: DISCONTINUED | OUTPATIENT
Start: 2024-06-12 | End: 2024-06-17

## 2024-06-12 RX ORDER — FLUPHENAZINE HCL 10 MG
5 TABLET ORAL ONCE
Refills: 0 | Status: DISCONTINUED | OUTPATIENT
Start: 2024-06-12 | End: 2024-09-28

## 2024-06-12 RX ADMIN — OLANZAPINE 5 MILLIGRAM(S): 10 TABLET ORAL at 10:09

## 2024-06-12 RX ADMIN — Medication 75 MICROGRAM(S): at 06:25

## 2024-06-12 RX ADMIN — Medication 5 MILLIGRAM(S): at 11:38

## 2024-06-13 PROCEDURE — 99232 SBSQ HOSP IP/OBS MODERATE 35: CPT

## 2024-06-13 RX ORDER — RISPERIDONE 4 MG
1.5 TABLET ORAL AT BEDTIME
Refills: 0 | Status: DISCONTINUED | OUTPATIENT
Start: 2024-06-13 | End: 2024-06-14

## 2024-06-13 RX ADMIN — METFORMIN HYDROCHLORIDE 1000 MILLIGRAM(S): 850 TABLET ORAL at 20:43

## 2024-06-13 RX ADMIN — Medication 750 MILLIGRAM(S): at 20:43

## 2024-06-13 RX ADMIN — Medication 1 TABLET(S): at 20:44

## 2024-06-13 RX ADMIN — Medication 1.5 MILLIGRAM(S): at 20:44

## 2024-06-13 RX ADMIN — Medication 1 MILLIGRAM(S): at 20:44

## 2024-06-13 RX ADMIN — Medication 75 MICROGRAM(S): at 06:29

## 2024-06-13 RX ADMIN — Medication 2 TABLET(S): at 20:43

## 2024-06-14 PROCEDURE — 99232 SBSQ HOSP IP/OBS MODERATE 35: CPT

## 2024-06-14 RX ORDER — FLUPHENAZINE HCL 10 MG
5 TABLET ORAL ONCE
Refills: 0 | Status: COMPLETED | OUTPATIENT
Start: 2024-06-14 | End: 2024-06-14

## 2024-06-14 RX ORDER — RISPERIDONE 4 MG
2 TABLET ORAL AT BEDTIME
Refills: 0 | Status: DISCONTINUED | OUTPATIENT
Start: 2024-06-14 | End: 2024-06-17

## 2024-06-14 RX ORDER — DIPHENHYDRAMINE HCL 12.5MG/5ML
25 ELIXIR ORAL ONCE
Refills: 0 | Status: DISCONTINUED | OUTPATIENT
Start: 2024-06-14 | End: 2024-06-17

## 2024-06-14 RX ADMIN — Medication 5 MILLIGRAM(S): at 19:40

## 2024-06-14 RX ADMIN — Medication 25 MILLIGRAM(S): at 22:12

## 2024-06-15 LAB — GLUCOSE BLDC GLUCOMTR-MCNC: 254 MG/DL — HIGH (ref 70–99)

## 2024-06-15 PROCEDURE — 99232 SBSQ HOSP IP/OBS MODERATE 35: CPT

## 2024-06-15 RX ORDER — LORAZEPAM 4 MG/ML
1 VIAL (ML) INJECTION ONCE
Refills: 0 | Status: DISCONTINUED | OUTPATIENT
Start: 2024-06-15 | End: 2024-06-16

## 2024-06-15 RX ORDER — LORAZEPAM 4 MG/ML
1 VIAL (ML) INJECTION ONCE
Refills: 0 | Status: DISCONTINUED | OUTPATIENT
Start: 2024-06-15 | End: 2024-06-15

## 2024-06-15 RX ORDER — FLUPHENAZINE HCL 10 MG
5 TABLET ORAL
Refills: 0 | Status: DISCONTINUED | OUTPATIENT
Start: 2024-06-15 | End: 2024-06-17

## 2024-06-15 RX ADMIN — Medication 5 MILLIGRAM(S): at 14:10

## 2024-06-15 RX ADMIN — Medication 1 MILLIGRAM(S): at 14:09

## 2024-06-16 ENCOUNTER — EMERGENCY (EMERGENCY)
Facility: HOSPITAL | Age: 74
LOS: 1 days | Discharge: HOME | End: 2024-06-16
Attending: EMERGENCY MEDICINE | Admitting: EMERGENCY MEDICINE
Payer: MEDICARE

## 2024-06-16 VITALS
RESPIRATION RATE: 18 BRPM | SYSTOLIC BLOOD PRESSURE: 121 MMHG | OXYGEN SATURATION: 100 % | DIASTOLIC BLOOD PRESSURE: 76 MMHG | HEART RATE: 80 BPM | TEMPERATURE: 97 F

## 2024-06-16 VITALS
RESPIRATION RATE: 17 BRPM | WEIGHT: 139.99 LBS | OXYGEN SATURATION: 100 % | HEART RATE: 90 BPM | SYSTOLIC BLOOD PRESSURE: 137 MMHG | TEMPERATURE: 98 F | DIASTOLIC BLOOD PRESSURE: 94 MMHG | HEIGHT: 60 IN

## 2024-06-16 PROCEDURE — 70450 CT HEAD/BRAIN W/O DYE: CPT | Mod: 26,MC

## 2024-06-16 PROCEDURE — 99232 SBSQ HOSP IP/OBS MODERATE 35: CPT

## 2024-06-16 PROCEDURE — 99285 EMERGENCY DEPT VISIT HI MDM: CPT

## 2024-06-16 PROCEDURE — 72125 CT NECK SPINE W/O DYE: CPT | Mod: 26,MC

## 2024-06-16 RX ORDER — ACETAMINOPHEN 500 MG
975 TABLET ORAL ONCE
Refills: 0 | Status: COMPLETED | OUTPATIENT
Start: 2024-06-16 | End: 2024-06-16

## 2024-06-16 RX ORDER — MIDAZOLAM HYDROCHLORIDE 1 MG/ML
4 INJECTION, SOLUTION INTRAMUSCULAR; INTRAVENOUS ONCE
Refills: 0 | Status: DISCONTINUED | OUTPATIENT
Start: 2024-06-16 | End: 2024-06-16

## 2024-06-16 RX ORDER — LORAZEPAM 4 MG/ML
0.5 VIAL (ML) INJECTION ONCE
Refills: 0 | Status: DISCONTINUED | OUTPATIENT
Start: 2024-06-16 | End: 2024-06-17

## 2024-06-16 RX ORDER — MIDAZOLAM HYDROCHLORIDE 1 MG/ML
2 INJECTION, SOLUTION INTRAMUSCULAR; INTRAVENOUS ONCE
Refills: 0 | Status: DISCONTINUED | OUTPATIENT
Start: 2024-06-16 | End: 2024-06-16

## 2024-06-16 RX ORDER — HALOPERIDOL DECANOATE 100 MG/ML
5 INJECTION INTRAMUSCULAR ONCE
Refills: 0 | Status: COMPLETED | OUTPATIENT
Start: 2024-06-16 | End: 2024-06-16

## 2024-06-16 RX ORDER — DIPHENHYDRAMINE HCL 50 MG
25 CAPSULE ORAL ONCE
Refills: 0 | Status: COMPLETED | OUTPATIENT
Start: 2024-06-16 | End: 2024-06-16

## 2024-06-16 RX ADMIN — Medication 25 MILLIGRAM(S): at 11:22

## 2024-06-16 RX ADMIN — Medication 1 MILLIGRAM(S): at 18:02

## 2024-06-16 RX ADMIN — HALOPERIDOL DECANOATE 5 MILLIGRAM(S): 100 INJECTION INTRAMUSCULAR at 02:04

## 2024-06-16 RX ADMIN — Medication 2 MILLIGRAM(S): at 18:02

## 2024-06-16 RX ADMIN — MIDAZOLAM HYDROCHLORIDE 2 MILLIGRAM(S): 1 INJECTION, SOLUTION INTRAMUSCULAR; INTRAVENOUS at 05:32

## 2024-06-16 RX ADMIN — MIDAZOLAM HYDROCHLORIDE 2 MILLIGRAM(S): 1 INJECTION, SOLUTION INTRAMUSCULAR; INTRAVENOUS at 02:03

## 2024-06-16 RX ADMIN — METFORMIN HYDROCHLORIDE 1000 MILLIGRAM(S): 850 TABLET ORAL at 18:03

## 2024-06-16 RX ADMIN — Medication 750 MILLIGRAM(S): at 18:02

## 2024-06-16 RX ADMIN — Medication 1 MILLIGRAM(S): at 11:23

## 2024-06-16 RX ADMIN — MIDAZOLAM HYDROCHLORIDE 4 MILLIGRAM(S): 1 INJECTION, SOLUTION INTRAMUSCULAR; INTRAVENOUS at 06:53

## 2024-06-16 RX ADMIN — Medication 1 TABLET(S): at 18:03

## 2024-06-16 NOTE — ED ADULT TRIAGE NOTE - CHIEF COMPLAINT QUOTE
Pt arrives from [facility] s/p witnessed mechanical fall forward hitting head.  No LOC.  No blood thinners.  [facility] aide at bedside.  Hx Schizophrenia, parkinsons', asthma, DM Pt arrives from [facility] s/p witnessed mechanical fall forward hitting head.  No LOC.  No blood thinners.  [facility] aide at bedside.  Hx Schizophrenia, parkinsons', asthma, DM.  Pt uncooperative when finger stick attempted, unable to obtain.

## 2024-06-16 NOTE — ED ADULT TRIAGE NOTE - ARRIVAL FROM
Fasting blood work  Zoloft 50 mg daily  Melatonin 10 mg nightly  Diet and exercise as discussed  Call office if insomnia not resolved or anxiety not controlled  
Hospitals/Psychiatric Facilities

## 2024-06-16 NOTE — ED PROVIDER NOTE - DISCHARGE REVIEW MATERIAL PRESENTED
PHYSICAL EXAM:  GEN: No acute distress  HEENT: NCAT  LUNGS: Clear to auscultation bilaterally   HEART: S1/S2 present. Clicking of S1  ABD: Soft, non-tender, non-distended. Bowel sounds present. Rectal exam yielded mcgill-brown stool  EXT: No pitting edema  NEURO: AAOX3 .

## 2024-06-16 NOTE — ED PROVIDER NOTE - OBJECTIVE STATEMENT
The patient is a 73y Female who has a past medical and surgery history of Parkinson disease (AO1) Seizure disorder Hypothyroidism Type II diabetes mellitus Hyperlipidemia Schizophrenia Hemorrhoids Hypertension PTED s/p witnessed head strike to her Frontal/ R side head at [facility] pt receive  fluPHENAZine 5 milliGRAM(s) Oral PRN LORazepam   Injectable 1 milliGRAM(s) IntraMuscular PRN

## 2024-06-16 NOTE — ED ADULT NURSE NOTE - OBJECTIVE STATEMENT
Pt is aaox1 (to self only). Pt is from [facility] brought in s/p witnessed fall with + headstrike. Pt is confused and agitated. PRN medications administered as ordered. Aid from the facility is present at the bedside. Stretcher locked in its lowest position with both side rails on. Plan of care ongoing.

## 2024-06-16 NOTE — ED PROVIDER NOTE - DATE/TIME 1
Patient received in bed awake. Patient alert and oriented X4, denies pain and discomfort. Patient resting quietly. Frequent use items within reach. Bed locked in low position. Call bell within reach and patient verbalized understanding of use for assistance and needs. Dual [facility] conducted. 16-Jun-2024 11:09

## 2024-06-16 NOTE — ED ADULT NURSE NOTE - NSFALLRISKINTERV_ED_ALL_ED
Assistance OOB with selected safe patient handling equipment if applicable/Assistance with ambulation/Communicate fall risk and risk factors to all staff, patient, and family/Monitor gait and stability/Monitor for mental status changes and reorient to person, place, and time, as needed/Move patient closer to nursing station/within visual sight of ED staff/Provide visual cue: yellow wristband, yellow gown, etc/Reinforce activity limits and safety measures with patient and family/Toileting schedule using arm’s reach rule for commode and bathroom/Use of alarms - bed, stretcher, chair and/or video monitoring/Call bell, personal items and telephone in reach/Instruct patient to call for assistance before getting out of bed/chair/stretcher/Non-slip footwear applied when patient is off stretcher/[place] to call system/Physically safe environment - no spills, clutter or unnecessary equipment/Purposeful Proactive Rounding/Room/bathroom lighting operational, light cord in reach

## 2024-06-16 NOTE — ED ADULT NURSE NOTE - CHIEF COMPLAINT QUOTE
Pt arrives from [facility] s/p witnessed mechanical fall forward hitting head.  No LOC.  No blood thinners.  [facility] aide at bedside.  Hx Schizophrenia, parkinsons', asthma, DM.  Pt uncooperative when finger stick attempted, unable to obtain.

## 2024-06-16 NOTE — ED PROVIDER NOTE - CLINICAL SUMMARY MEDICAL DECISION MAKING FREE TEXT BOX
The patient is a 73y Female who has a past medical and surgery history of Parkinson disease (AO1) Seizure disorder Hypothyroidism Type II diabetes mellitus Hyperlipidemia Schizophrenia Hemorrhoids Hypertension PTED s/p witnessed head strike to her Frontal/ R side head at [facility] pt receive  fluPHENAZine 5 milliGRAM(s) Oral PRN LORazepam   Injectable 1 milliGRAM(s) IntraMuscular PRN  Vital Signs Stable  PE: as described; my additions and exceptions are noted in the chart    DATA:  EKG: pending at time of evaluation  LAB: Pending at time of evaluation    IMPRESSION/RISK:  Dx= Head injury  Consideration include given age risk factors cspine ct ordered   Plan  as above  Pt received  fluPHENAZine 5 milliGRAM(s) Oral PRN  LORazepam   Injectable 1 milliGRAM(s) IntraMuscular PRN  prior to transfer will reassess need during stay for more  labs preops consultation only if results +

## 2024-06-16 NOTE — ED ADULT TRIAGE NOTE - BANDS:
82 y/o M with PMH of Prostate CA s/p RT 20 years ago, OA, and Lactose Intolerance presented with left hip fracture s/p mechanical fall.
Allergy;

## 2024-06-16 NOTE — ED ADULT NURSE NOTE - NSICDXPASTMEDICALHX_GEN_ALL_CORE_FT
I independently performed the documented: PAST MEDICAL HISTORY:  Hemorrhoids     Hyperlipidemia     Hypertension     Hypothyroidism     Parkinson disease     Schizophrenia     Seizure disorder     Type II diabetes mellitus

## 2024-06-16 NOTE — ED PROVIDER NOTE - PROGRESS NOTE DETAILS
Chandler PGY3: Patient endorsed to me at sign out -awaiting transport. Called SW to facilitate ambulance as pt is an elopement/agitation risk. Pt while here continuously getting up and agitated - reviewed last mateo note. Found to have PRN for fluphenazine, ativan and benadryl. IM benadrly/ativan ordered

## 2024-06-16 NOTE — ED PROVIDER NOTE - PATIENT PORTAL LINK FT
You can access the FollowMyHealth Patient Portal offered by [facility] by registering at the following website: [email or web address]. By joining [facility]’s FollowMyHealth portal, you will also be able to view your health information using other applications (apps) compatible with our system.

## 2024-06-16 NOTE — ED ADULT TRIAGE NOTE - HOW PATIENT ADDRESSED, PROFILE
Patient states that daughter Bunny Fabry will be with them for at least 24 hours following today's procedure. Reina

## 2024-06-16 NOTE — ED ADULT NURSE REASSESSMENT NOTE - NS ED NURSE REASSESS COMMENT FT1
Pt cleaned of incontinence, bed linen changed. Pt turned and repositioned for comfort.
Report given to 2 [facility] RN Vianney. Pt to be transferred back. EMS set up at this time.
Spoke with ALICIA Faith on [address]. Advised they are ready to receive pt back to unit after being medicated and now more lethargic. Pt arousable to tactile stimuli.
Received report from night shift RN. Pt resting in bed with [facility] staff at bedside, pending discharge transportation.

## 2024-06-16 NOTE — PROVIDER CONTACT NOTE (OTHER) - ASSESSMENT
Medical team  referred this case pt has been medically cleared for return back  to [facility]. As per medical team pt is from [facility] returning back to [address] [place] [address] .Pt is a safety elopement risk.  Writer contacted [facility] [phone] spoke with Mr. Walker  and set up transportation. After this Mr. Walker  spoke with RN in ED and report was given.  Pt will be picked up by [facility]. Non Emergent ambulance form was filled up and attached to the chart for EMS. Writer informed Medical team about the pickup time at 11:30 pm. no further Sw intervention needed for this visit.

## 2024-06-17 PROCEDURE — 99232 SBSQ HOSP IP/OBS MODERATE 35: CPT

## 2024-06-17 RX ORDER — RISPERIDONE 4 MG
2 TABLET ORAL DAILY
Refills: 0 | Status: DISCONTINUED | OUTPATIENT
Start: 2024-06-17 | End: 2024-06-21

## 2024-06-17 RX ORDER — FLUPHENAZINE HCL 10 MG
5 TABLET ORAL EVERY 12 HOURS
Refills: 0 | Status: DISCONTINUED | OUTPATIENT
Start: 2024-06-17 | End: 2024-06-17

## 2024-06-17 RX ORDER — RISPERIDONE 4 MG
2 TABLET ORAL ONCE
Refills: 0 | Status: COMPLETED | OUTPATIENT
Start: 2024-06-17 | End: 2024-06-17

## 2024-06-17 RX ORDER — RISPERIDONE 4 MG
2 TABLET ORAL ONCE
Refills: 0 | Status: DISCONTINUED | OUTPATIENT
Start: 2024-06-17 | End: 2024-06-17

## 2024-06-17 RX ORDER — FLUPHENAZINE HCL 10 MG
2 TABLET ORAL EVERY 12 HOURS
Refills: 0 | Status: DISCONTINUED | OUTPATIENT
Start: 2024-06-17 | End: 2024-09-28

## 2024-06-17 RX ORDER — FLUPHENAZINE HCL 10 MG
5 TABLET ORAL DAILY
Refills: 0 | Status: DISCONTINUED | OUTPATIENT
Start: 2024-06-17 | End: 2024-06-21

## 2024-06-17 RX ADMIN — Medication 1 MILLIGRAM(S): at 20:25

## 2024-06-17 RX ADMIN — Medication 750 MILLIGRAM(S): at 08:15

## 2024-06-17 RX ADMIN — METFORMIN HYDROCHLORIDE 1000 MILLIGRAM(S): 850 TABLET ORAL at 08:14

## 2024-06-17 RX ADMIN — Medication 75 MICROGRAM(S): at 06:21

## 2024-06-17 RX ADMIN — Medication 2 MILLIGRAM(S): at 16:47

## 2024-06-17 RX ADMIN — Medication 1 TABLET(S): at 20:25

## 2024-06-17 RX ADMIN — METFORMIN HYDROCHLORIDE 1000 MILLIGRAM(S): 850 TABLET ORAL at 20:25

## 2024-06-17 RX ADMIN — Medication 1 MILLIGRAM(S): at 08:14

## 2024-06-17 RX ADMIN — Medication 400 UNIT(S): at 08:15

## 2024-06-17 RX ADMIN — Medication 2 TABLET(S): at 20:25

## 2024-06-17 RX ADMIN — Medication 750 MILLIGRAM(S): at 20:25

## 2024-06-17 RX ADMIN — METOPROLOL SUCCINATE 50 MILLIGRAM(S): 50 TABLET, EXTENDED RELEASE ORAL at 08:15

## 2024-06-18 LAB — GLUCOSE BLDC GLUCOMTR-MCNC: 116 MG/DL — HIGH (ref 70–99)

## 2024-06-18 PROCEDURE — 99232 SBSQ HOSP IP/OBS MODERATE 35: CPT

## 2024-06-18 RX ADMIN — Medication 1 TABLET(S): at 22:01

## 2024-06-18 RX ADMIN — Medication 750 MILLIGRAM(S): at 22:01

## 2024-06-18 RX ADMIN — Medication 75 MICROGRAM(S): at 06:09

## 2024-06-18 RX ADMIN — Medication 1 MILLIGRAM(S): at 22:02

## 2024-06-18 RX ADMIN — Medication 5 MILLIGRAM(S): at 10:25

## 2024-06-18 RX ADMIN — Medication 2 TABLET(S): at 22:02

## 2024-06-19 PROCEDURE — 99232 SBSQ HOSP IP/OBS MODERATE 35: CPT

## 2024-06-19 RX ADMIN — Medication 1 MILLIGRAM(S): at 20:29

## 2024-06-19 RX ADMIN — Medication 75 MICROGRAM(S): at 06:58

## 2024-06-19 RX ADMIN — METFORMIN HYDROCHLORIDE 1000 MILLIGRAM(S): 850 TABLET ORAL at 20:29

## 2024-06-19 RX ADMIN — Medication 1 TABLET(S): at 20:29

## 2024-06-19 RX ADMIN — Medication 5 MILLIGRAM(S): at 10:02

## 2024-06-19 RX ADMIN — Medication 2 TABLET(S): at 20:29

## 2024-06-19 RX ADMIN — Medication 750 MILLIGRAM(S): at 20:29

## 2024-06-20 PROCEDURE — 99232 SBSQ HOSP IP/OBS MODERATE 35: CPT

## 2024-06-20 RX ADMIN — Medication 400 UNIT(S): at 14:48

## 2024-06-20 RX ADMIN — Medication 1 TABLET(S): at 21:54

## 2024-06-20 RX ADMIN — Medication 5 MILLIGRAM(S): at 09:45

## 2024-06-20 RX ADMIN — Medication 1 MILLIGRAM(S): at 14:48

## 2024-06-20 RX ADMIN — Medication 2 TABLET(S): at 21:53

## 2024-06-20 RX ADMIN — METFORMIN HYDROCHLORIDE 1000 MILLIGRAM(S): 850 TABLET ORAL at 21:54

## 2024-06-20 RX ADMIN — Medication 1 MILLIGRAM(S): at 21:53

## 2024-06-20 RX ADMIN — Medication 2 MILLIGRAM(S): at 14:47

## 2024-06-20 RX ADMIN — Medication 750 MILLIGRAM(S): at 21:53

## 2024-06-21 PROCEDURE — 99232 SBSQ HOSP IP/OBS MODERATE 35: CPT

## 2024-06-21 RX ORDER — FLUPHENAZINE HCL 10 MG
5 TABLET ORAL DAILY
Refills: 0 | Status: DISCONTINUED | OUTPATIENT
Start: 2024-06-21 | End: 2024-06-23

## 2024-06-21 RX ORDER — FLUPHENAZINE HCL 10 MG
5 TABLET ORAL DAILY
Refills: 0 | Status: DISCONTINUED | OUTPATIENT
Start: 2024-06-21 | End: 2024-06-21

## 2024-06-21 RX ORDER — FLUPHENAZINE HCL 10 MG
5 TABLET ORAL ONCE
Refills: 0 | Status: COMPLETED | OUTPATIENT
Start: 2024-06-21 | End: 2024-06-21

## 2024-06-21 RX ADMIN — Medication 5 MILLIGRAM(S): at 12:13

## 2024-06-22 PROCEDURE — 99231 SBSQ HOSP IP/OBS SF/LOW 25: CPT

## 2024-06-22 RX ADMIN — Medication 5 MILLIGRAM(S): at 10:52

## 2024-06-22 RX ADMIN — Medication 75 MICROGRAM(S): at 06:08

## 2024-06-23 PROCEDURE — 99231 SBSQ HOSP IP/OBS SF/LOW 25: CPT

## 2024-06-23 RX ORDER — FLUPHENAZINE HCL 10 MG
2 TABLET ORAL ONCE
Refills: 0 | Status: COMPLETED | OUTPATIENT
Start: 2024-06-23 | End: 2024-06-23

## 2024-06-23 RX ORDER — FLUPHENAZINE HCL 10 MG
5 TABLET ORAL DAILY
Refills: 0 | Status: DISCONTINUED | OUTPATIENT
Start: 2024-06-23 | End: 2024-06-23

## 2024-06-23 RX ORDER — FLUPHENAZINE HCL 10 MG
5 TABLET ORAL ONCE
Refills: 0 | Status: COMPLETED | OUTPATIENT
Start: 2024-06-23 | End: 2024-06-23

## 2024-06-23 RX ORDER — FLUPHENAZINE HCL 10 MG
5 TABLET ORAL
Refills: 0 | Status: DISCONTINUED | OUTPATIENT
Start: 2024-06-23 | End: 2024-07-02

## 2024-06-23 RX ORDER — DIPHENHYDRAMINE HCL 12.5MG/5ML
25 ELIXIR ORAL ONCE
Refills: 0 | Status: COMPLETED | OUTPATIENT
Start: 2024-06-23 | End: 2024-06-23

## 2024-06-23 RX ADMIN — Medication 2 MILLIGRAM(S): at 06:24

## 2024-06-23 RX ADMIN — Medication 5 MILLIGRAM(S): at 08:53

## 2024-06-23 RX ADMIN — Medication 25 MILLIGRAM(S): at 06:23

## 2024-06-23 RX ADMIN — Medication 5 MILLIGRAM(S): at 21:01

## 2024-06-24 PROCEDURE — 99232 SBSQ HOSP IP/OBS MODERATE 35: CPT

## 2024-06-24 RX ORDER — DIPHENHYDRAMINE HCL 12.5MG/5ML
12.5 ELIXIR ORAL
Refills: 0 | Status: DISCONTINUED | OUTPATIENT
Start: 2024-06-24 | End: 2024-06-26

## 2024-06-24 RX ADMIN — Medication 12.5 MILLIGRAM(S): at 20:10

## 2024-06-24 RX ADMIN — Medication 5 MILLIGRAM(S): at 20:10

## 2024-06-24 RX ADMIN — Medication 5 MILLIGRAM(S): at 08:48

## 2024-06-25 LAB
ALBUMIN SERPL ELPH-MCNC: 3.6 G/DL — SIGNIFICANT CHANGE UP (ref 3.3–5)
ALP SERPL-CCNC: 115 U/L — SIGNIFICANT CHANGE UP (ref 40–120)
ALT FLD-CCNC: 23 U/L — SIGNIFICANT CHANGE UP (ref 4–33)
ANION GAP SERPL CALC-SCNC: 14 MMOL/L — SIGNIFICANT CHANGE UP (ref 7–14)
AST SERPL-CCNC: 33 U/L — HIGH (ref 4–32)
BASOPHILS # BLD AUTO: 0.02 K/UL — SIGNIFICANT CHANGE UP (ref 0–0.2)
BASOPHILS NFR BLD AUTO: 0.3 % — SIGNIFICANT CHANGE UP (ref 0–2)
BILIRUB SERPL-MCNC: <0.2 MG/DL — SIGNIFICANT CHANGE UP (ref 0.2–1.2)
BUN SERPL-MCNC: 21 MG/DL — SIGNIFICANT CHANGE UP (ref 7–23)
CALCIUM SERPL-MCNC: 9 MG/DL — SIGNIFICANT CHANGE UP (ref 8.4–10.5)
CHLORIDE SERPL-SCNC: 106 MMOL/L — SIGNIFICANT CHANGE UP (ref 98–107)
CO2 SERPL-SCNC: 20 MMOL/L — LOW (ref 22–31)
CREAT SERPL-MCNC: 0.74 MG/DL — SIGNIFICANT CHANGE UP (ref 0.5–1.3)
EGFR: 86 ML/MIN/1.73M2 — SIGNIFICANT CHANGE UP
EGFR: 86 ML/MIN/1.73M2 — SIGNIFICANT CHANGE UP
EOSINOPHIL # BLD AUTO: 0.11 K/UL — SIGNIFICANT CHANGE UP (ref 0–0.5)
EOSINOPHIL NFR BLD AUTO: 1.9 % — SIGNIFICANT CHANGE UP (ref 0–6)
GLUCOSE SERPL-MCNC: 177 MG/DL — HIGH (ref 70–99)
HCT VFR BLD CALC: 35.1 % — SIGNIFICANT CHANGE UP (ref 34.5–45)
HGB BLD-MCNC: 10.9 G/DL — LOW (ref 11.5–15.5)
IANC: 3.69 K/UL — SIGNIFICANT CHANGE UP (ref 1.8–7.4)
IMM GRANULOCYTES NFR BLD AUTO: 0.2 % — SIGNIFICANT CHANGE UP (ref 0–0.9)
LYMPHOCYTES # BLD AUTO: 1.36 K/UL — SIGNIFICANT CHANGE UP (ref 1–3.3)
LYMPHOCYTES # BLD AUTO: 23.2 % — SIGNIFICANT CHANGE UP (ref 13–44)
MCHC RBC-ENTMCNC: 27.5 PG — SIGNIFICANT CHANGE UP (ref 27–34)
MCHC RBC-ENTMCNC: 31.1 GM/DL — LOW (ref 32–36)
MCV RBC AUTO: 88.4 FL — SIGNIFICANT CHANGE UP (ref 80–100)
MONOCYTES # BLD AUTO: 0.66 K/UL — SIGNIFICANT CHANGE UP (ref 0–0.9)
MONOCYTES NFR BLD AUTO: 11.3 % — SIGNIFICANT CHANGE UP (ref 2–14)
NEUTROPHILS # BLD AUTO: 3.69 K/UL — SIGNIFICANT CHANGE UP (ref 1.8–7.4)
NEUTROPHILS NFR BLD AUTO: 63.1 % — SIGNIFICANT CHANGE UP (ref 43–77)
NRBC # BLD AUTO: 0 K/UL — SIGNIFICANT CHANGE UP (ref 0–0)
NRBC # BLD: 0 /100 WBCS — SIGNIFICANT CHANGE UP (ref 0–0)
NRBC # FLD: 0 K/UL — SIGNIFICANT CHANGE UP (ref 0–0)
NRBC BLD-RTO: 0 /100 WBCS — SIGNIFICANT CHANGE UP (ref 0–0)
PLATELET # BLD AUTO: 228 K/UL — SIGNIFICANT CHANGE UP (ref 150–400)
POTASSIUM SERPL-MCNC: 4.5 MMOL/L — SIGNIFICANT CHANGE UP (ref 3.5–5.3)
POTASSIUM SERPL-SCNC: 4.5 MMOL/L — SIGNIFICANT CHANGE UP (ref 3.5–5.3)
PROT SERPL-MCNC: 7.1 G/DL — SIGNIFICANT CHANGE UP (ref 6–8.3)
RBC # BLD: 3.97 M/UL — SIGNIFICANT CHANGE UP (ref 3.8–5.2)
RBC # FLD: 13.4 % — SIGNIFICANT CHANGE UP (ref 10.3–14.5)
SODIUM SERPL-SCNC: 140 MMOL/L — SIGNIFICANT CHANGE UP (ref 135–145)
TSH SERPL-MCNC: 3.01 UIU/ML — SIGNIFICANT CHANGE UP (ref 0.27–4.2)
WBC # BLD: 5.85 K/UL — SIGNIFICANT CHANGE UP (ref 3.8–10.5)
WBC # FLD AUTO: 5.85 K/UL — SIGNIFICANT CHANGE UP (ref 3.8–10.5)

## 2024-06-25 PROCEDURE — 99232 SBSQ HOSP IP/OBS MODERATE 35: CPT

## 2024-06-25 RX ADMIN — METFORMIN HYDROCHLORIDE 1000 MILLIGRAM(S): 850 TABLET ORAL at 20:15

## 2024-06-25 RX ADMIN — Medication 1 TABLET(S): at 20:15

## 2024-06-25 RX ADMIN — Medication 2 TABLET(S): at 20:15

## 2024-06-25 RX ADMIN — Medication 12.5 MILLIGRAM(S): at 09:24

## 2024-06-25 RX ADMIN — Medication 750 MILLIGRAM(S): at 20:15

## 2024-06-25 RX ADMIN — Medication 75 MICROGRAM(S): at 06:29

## 2024-06-25 RX ADMIN — Medication 5 MILLIGRAM(S): at 20:16

## 2024-06-25 RX ADMIN — Medication 5 MILLIGRAM(S): at 09:23

## 2024-06-25 RX ADMIN — Medication 1 MILLIGRAM(S): at 20:15

## 2024-06-26 PROCEDURE — 99232 SBSQ HOSP IP/OBS MODERATE 35: CPT

## 2024-06-26 RX ORDER — DIPHENHYDRAMINE HCL 12.5MG/5ML
25 ELIXIR ORAL
Refills: 0 | Status: DISCONTINUED | OUTPATIENT
Start: 2024-06-26 | End: 2024-07-15

## 2024-06-26 RX ADMIN — Medication 25 MILLIGRAM(S): at 22:07

## 2024-06-26 RX ADMIN — Medication 5 MILLIGRAM(S): at 09:40

## 2024-06-26 RX ADMIN — Medication 5 MILLIGRAM(S): at 22:08

## 2024-06-26 RX ADMIN — Medication 12.5 MILLIGRAM(S): at 09:40

## 2024-06-27 PROCEDURE — 99232 SBSQ HOSP IP/OBS MODERATE 35: CPT

## 2024-06-27 RX ADMIN — METFORMIN HYDROCHLORIDE 1000 MILLIGRAM(S): 850 TABLET ORAL at 21:39

## 2024-06-27 RX ADMIN — Medication 2 TABLET(S): at 21:40

## 2024-06-27 RX ADMIN — Medication 1 MILLIGRAM(S): at 21:39

## 2024-06-27 RX ADMIN — Medication 5 MILLIGRAM(S): at 10:00

## 2024-06-27 RX ADMIN — Medication 75 MICROGRAM(S): at 06:43

## 2024-06-27 RX ADMIN — Medication 25 MILLIGRAM(S): at 10:00

## 2024-06-27 RX ADMIN — Medication 750 MILLIGRAM(S): at 21:39

## 2024-06-27 RX ADMIN — Medication 5 MILLIGRAM(S): at 22:12

## 2024-06-27 RX ADMIN — Medication 1 TABLET(S): at 21:39

## 2024-06-28 PROCEDURE — 99231 SBSQ HOSP IP/OBS SF/LOW 25: CPT

## 2024-06-28 RX ADMIN — Medication 1 MILLIGRAM(S): at 20:13

## 2024-06-28 RX ADMIN — Medication 1 MILLIGRAM(S): at 09:24

## 2024-06-28 RX ADMIN — METOPROLOL SUCCINATE 50 MILLIGRAM(S): 50 TABLET, EXTENDED RELEASE ORAL at 09:24

## 2024-06-28 RX ADMIN — Medication 2 TABLET(S): at 20:13

## 2024-06-28 RX ADMIN — Medication 400 UNIT(S): at 09:24

## 2024-06-28 RX ADMIN — Medication 75 MICROGRAM(S): at 06:20

## 2024-06-28 RX ADMIN — Medication 750 MILLIGRAM(S): at 20:13

## 2024-06-28 RX ADMIN — Medication 750 MILLIGRAM(S): at 09:23

## 2024-06-28 RX ADMIN — Medication 1 TABLET(S): at 20:14

## 2024-06-28 RX ADMIN — Medication 5 MILLIGRAM(S): at 20:14

## 2024-06-28 RX ADMIN — METFORMIN HYDROCHLORIDE 1000 MILLIGRAM(S): 850 TABLET ORAL at 09:23

## 2024-06-28 RX ADMIN — METFORMIN HYDROCHLORIDE 1000 MILLIGRAM(S): 850 TABLET ORAL at 20:14

## 2024-06-28 RX ADMIN — Medication 5 MILLIGRAM(S): at 09:23

## 2024-06-29 RX ADMIN — Medication 1 MILLIGRAM(S): at 12:49

## 2024-06-29 RX ADMIN — Medication 400 UNIT(S): at 12:48

## 2024-06-29 RX ADMIN — METFORMIN HYDROCHLORIDE 1000 MILLIGRAM(S): 850 TABLET ORAL at 21:04

## 2024-06-29 RX ADMIN — Medication 750 MILLIGRAM(S): at 12:49

## 2024-06-29 RX ADMIN — Medication 2 TABLET(S): at 21:04

## 2024-06-29 RX ADMIN — Medication 1 TABLET(S): at 21:04

## 2024-06-29 RX ADMIN — Medication 5 MILLIGRAM(S): at 21:04

## 2024-06-29 RX ADMIN — Medication 1 MILLIGRAM(S): at 21:04

## 2024-06-29 RX ADMIN — Medication 75 MICROGRAM(S): at 06:22

## 2024-06-29 RX ADMIN — METOPROLOL SUCCINATE 50 MILLIGRAM(S): 50 TABLET, EXTENDED RELEASE ORAL at 12:48

## 2024-06-29 RX ADMIN — Medication 5 MILLIGRAM(S): at 12:49

## 2024-06-29 RX ADMIN — METFORMIN HYDROCHLORIDE 1000 MILLIGRAM(S): 850 TABLET ORAL at 12:50

## 2024-06-29 RX ADMIN — Medication 750 MILLIGRAM(S): at 21:04

## 2024-06-30 PROCEDURE — 99231 SBSQ HOSP IP/OBS SF/LOW 25: CPT

## 2024-06-30 RX ADMIN — Medication 5 MILLIGRAM(S): at 22:11

## 2024-06-30 RX ADMIN — Medication 1 TABLET(S): at 22:09

## 2024-06-30 RX ADMIN — METOPROLOL SUCCINATE 50 MILLIGRAM(S): 50 TABLET, EXTENDED RELEASE ORAL at 11:25

## 2024-06-30 RX ADMIN — Medication 750 MILLIGRAM(S): at 22:08

## 2024-06-30 RX ADMIN — Medication 400 UNIT(S): at 11:25

## 2024-06-30 RX ADMIN — Medication 75 MICROGRAM(S): at 06:50

## 2024-06-30 RX ADMIN — Medication 2 TABLET(S): at 22:09

## 2024-06-30 RX ADMIN — METFORMIN HYDROCHLORIDE 1000 MILLIGRAM(S): 850 TABLET ORAL at 22:09

## 2024-06-30 RX ADMIN — Medication 1 MILLIGRAM(S): at 11:25

## 2024-06-30 RX ADMIN — METFORMIN HYDROCHLORIDE 1000 MILLIGRAM(S): 850 TABLET ORAL at 11:25

## 2024-06-30 RX ADMIN — Medication 750 MILLIGRAM(S): at 11:25

## 2024-06-30 RX ADMIN — Medication 1 MILLIGRAM(S): at 22:09

## 2024-06-30 RX ADMIN — Medication 5 MILLIGRAM(S): at 11:26

## 2024-07-01 RX ADMIN — Medication 750 MILLIGRAM(S): at 21:29

## 2024-07-01 RX ADMIN — Medication 1 MILLIGRAM(S): at 08:38

## 2024-07-01 RX ADMIN — Medication 2 TABLET(S): at 21:29

## 2024-07-01 RX ADMIN — Medication 1 MILLIGRAM(S): at 21:29

## 2024-07-01 RX ADMIN — METFORMIN HYDROCHLORIDE 1000 MILLIGRAM(S): 850 TABLET ORAL at 21:28

## 2024-07-01 RX ADMIN — Medication 400 UNIT(S): at 08:38

## 2024-07-01 RX ADMIN — Medication 5 MILLIGRAM(S): at 21:29

## 2024-07-01 RX ADMIN — POLYETHYLENE GLYCOL 3350 17 GRAM(S): 17 POWDER, FOR SOLUTION ORAL at 08:39

## 2024-07-01 RX ADMIN — METFORMIN HYDROCHLORIDE 1000 MILLIGRAM(S): 850 TABLET ORAL at 08:38

## 2024-07-01 RX ADMIN — METOPROLOL SUCCINATE 50 MILLIGRAM(S): 50 TABLET, EXTENDED RELEASE ORAL at 08:38

## 2024-07-01 RX ADMIN — Medication 5 MILLIGRAM(S): at 08:39

## 2024-07-01 RX ADMIN — Medication 750 MILLIGRAM(S): at 08:38

## 2024-07-01 RX ADMIN — Medication 1 TABLET(S): at 21:28

## 2024-07-02 LAB
APAP SERPL-MCNC: <10 UG/ML — LOW (ref 15–25)
CK SERPL-CCNC: 431 U/L — HIGH (ref 25–170)
ETHANOL SERPL-MCNC: <10 MG/DL — SIGNIFICANT CHANGE UP
SALICYLATES SERPL-MCNC: <0.3 MG/DL — LOW (ref 15–30)
TOXICOLOGY SCREEN, DRUGS OF ABUSE, SERUM RESULT: SIGNIFICANT CHANGE UP
VALPROATE SERPL-MCNC: 31.6 UG/ML — LOW (ref 50–100)

## 2024-07-02 PROCEDURE — 99232 SBSQ HOSP IP/OBS MODERATE 35: CPT

## 2024-07-02 PROCEDURE — 93010 ELECTROCARDIOGRAM REPORT: CPT

## 2024-07-02 RX ORDER — LORAZEPAM 4 MG/ML
1 VIAL (ML) INJECTION ONCE
Refills: 0 | Status: DISCONTINUED | OUTPATIENT
Start: 2024-07-02 | End: 2024-07-02

## 2024-07-02 RX ORDER — DIPHENHYDRAMINE HCL 12.5MG/5ML
25 ELIXIR ORAL ONCE
Refills: 0 | Status: COMPLETED | OUTPATIENT
Start: 2024-07-02 | End: 2024-07-02

## 2024-07-02 RX ORDER — FLUPHENAZINE HCL 10 MG
7.5 TABLET ORAL
Refills: 0 | Status: DISCONTINUED | OUTPATIENT
Start: 2024-07-02 | End: 2024-07-02

## 2024-07-02 RX ORDER — FLUPHENAZINE HCL 10 MG
5 TABLET ORAL ONCE
Refills: 0 | Status: COMPLETED | OUTPATIENT
Start: 2024-07-02 | End: 2024-07-02

## 2024-07-02 RX ORDER — FLUPHENAZINE HCL 10 MG
5 TABLET ORAL
Refills: 0 | Status: DISCONTINUED | OUTPATIENT
Start: 2024-07-02 | End: 2024-07-10

## 2024-07-02 RX ORDER — QUETIAPINE FUMARATE 25 MG/1
25 TABLET ORAL
Refills: 0 | Status: DISCONTINUED | OUTPATIENT
Start: 2024-07-02 | End: 2024-07-02

## 2024-07-02 RX ADMIN — Medication 750 MILLIGRAM(S): at 21:42

## 2024-07-02 RX ADMIN — Medication 1 TABLET(S): at 21:43

## 2024-07-02 RX ADMIN — Medication 75 MICROGRAM(S): at 06:12

## 2024-07-02 RX ADMIN — Medication 750 MILLIGRAM(S): at 08:56

## 2024-07-02 RX ADMIN — Medication 1 MILLIGRAM(S): at 08:56

## 2024-07-02 RX ADMIN — Medication 400 UNIT(S): at 08:56

## 2024-07-02 RX ADMIN — Medication 5 MILLIGRAM(S): at 21:42

## 2024-07-02 RX ADMIN — Medication 5 MILLIGRAM(S): at 10:40

## 2024-07-02 RX ADMIN — Medication 25 MILLIGRAM(S): at 10:40

## 2024-07-02 RX ADMIN — METFORMIN HYDROCHLORIDE 1000 MILLIGRAM(S): 850 TABLET ORAL at 21:43

## 2024-07-02 RX ADMIN — Medication 5 MILLIGRAM(S): at 08:57

## 2024-07-02 RX ADMIN — METFORMIN HYDROCHLORIDE 1000 MILLIGRAM(S): 850 TABLET ORAL at 08:57

## 2024-07-02 RX ADMIN — Medication 1 MILLIGRAM(S): at 10:42

## 2024-07-02 RX ADMIN — Medication 1 MILLIGRAM(S): at 21:43

## 2024-07-02 RX ADMIN — METOPROLOL SUCCINATE 50 MILLIGRAM(S): 50 TABLET, EXTENDED RELEASE ORAL at 08:56

## 2024-07-02 RX ADMIN — Medication 2 TABLET(S): at 21:42

## 2024-07-02 RX ADMIN — Medication 5 UNIT(S): at 10:29

## 2024-07-03 PROCEDURE — 99232 SBSQ HOSP IP/OBS MODERATE 35: CPT

## 2024-07-03 RX ADMIN — Medication 750 MILLIGRAM(S): at 21:28

## 2024-07-03 RX ADMIN — Medication 2 TABLET(S): at 21:28

## 2024-07-03 RX ADMIN — Medication 5 MILLIGRAM(S): at 10:07

## 2024-07-03 RX ADMIN — Medication 75 MICROGRAM(S): at 05:42

## 2024-07-03 RX ADMIN — METFORMIN HYDROCHLORIDE 1000 MILLIGRAM(S): 850 TABLET ORAL at 21:27

## 2024-07-03 RX ADMIN — Medication 1 MILLIGRAM(S): at 21:27

## 2024-07-03 RX ADMIN — Medication 1 TABLET(S): at 21:27

## 2024-07-03 RX ADMIN — Medication 5 MILLIGRAM(S): at 21:29

## 2024-07-04 PROCEDURE — 99231 SBSQ HOSP IP/OBS SF/LOW 25: CPT

## 2024-07-04 RX ADMIN — Medication 5 MILLIGRAM(S): at 10:24

## 2024-07-04 RX ADMIN — METFORMIN HYDROCHLORIDE 1000 MILLIGRAM(S): 850 TABLET ORAL at 21:07

## 2024-07-04 RX ADMIN — Medication 5 MILLIGRAM(S): at 21:07

## 2024-07-04 RX ADMIN — Medication 1 MILLIGRAM(S): at 21:07

## 2024-07-04 RX ADMIN — Medication 75 MICROGRAM(S): at 05:35

## 2024-07-04 RX ADMIN — Medication 750 MILLIGRAM(S): at 21:07

## 2024-07-04 RX ADMIN — Medication 5 UNIT(S): at 18:40

## 2024-07-04 RX ADMIN — Medication 2 TABLET(S): at 21:07

## 2024-07-04 RX ADMIN — Medication 1 TABLET(S): at 21:07

## 2024-07-05 LAB — GLUCOSE BLDC GLUCOMTR-MCNC: 166 MG/DL — HIGH (ref 70–99)

## 2024-07-05 PROCEDURE — 99232 SBSQ HOSP IP/OBS MODERATE 35: CPT

## 2024-07-05 RX ADMIN — Medication 5 MILLIGRAM(S): at 12:01

## 2024-07-05 RX ADMIN — Medication 1 MILLIGRAM(S): at 09:18

## 2024-07-05 RX ADMIN — Medication 2 TABLET(S): at 22:27

## 2024-07-05 RX ADMIN — Medication 750 MILLIGRAM(S): at 22:27

## 2024-07-05 RX ADMIN — Medication 5 MILLIGRAM(S): at 22:28

## 2024-07-05 RX ADMIN — Medication 750 MILLIGRAM(S): at 09:19

## 2024-07-05 RX ADMIN — METFORMIN HYDROCHLORIDE 1000 MILLIGRAM(S): 850 TABLET ORAL at 22:26

## 2024-07-05 RX ADMIN — Medication 1 MILLIGRAM(S): at 22:27

## 2024-07-05 RX ADMIN — METOPROLOL SUCCINATE 50 MILLIGRAM(S): 50 TABLET, EXTENDED RELEASE ORAL at 09:18

## 2024-07-05 RX ADMIN — Medication 1 TABLET(S): at 22:27

## 2024-07-05 RX ADMIN — METFORMIN HYDROCHLORIDE 1000 MILLIGRAM(S): 850 TABLET ORAL at 09:18

## 2024-07-05 RX ADMIN — Medication 400 UNIT(S): at 09:18

## 2024-07-06 PROCEDURE — 99231 SBSQ HOSP IP/OBS SF/LOW 25: CPT

## 2024-07-06 RX ADMIN — Medication 750 MILLIGRAM(S): at 20:38

## 2024-07-06 RX ADMIN — Medication 1 TABLET(S): at 20:38

## 2024-07-06 RX ADMIN — Medication 25 MILLIGRAM(S): at 09:35

## 2024-07-06 RX ADMIN — Medication 5 MILLIGRAM(S): at 20:42

## 2024-07-06 RX ADMIN — Medication 75 MICROGRAM(S): at 06:24

## 2024-07-06 RX ADMIN — METFORMIN HYDROCHLORIDE 1000 MILLIGRAM(S): 850 TABLET ORAL at 20:38

## 2024-07-06 RX ADMIN — Medication 2 TABLET(S): at 20:38

## 2024-07-06 RX ADMIN — Medication 5 MILLIGRAM(S): at 09:35

## 2024-07-06 RX ADMIN — Medication 1 MILLIGRAM(S): at 20:38

## 2024-07-07 PROCEDURE — 99231 SBSQ HOSP IP/OBS SF/LOW 25: CPT

## 2024-07-07 RX ADMIN — Medication 2 TABLET(S): at 22:18

## 2024-07-07 RX ADMIN — Medication 25 MILLIGRAM(S): at 09:18

## 2024-07-07 RX ADMIN — Medication 75 MICROGRAM(S): at 05:38

## 2024-07-07 RX ADMIN — Medication 5 MILLIGRAM(S): at 09:15

## 2024-07-07 RX ADMIN — Medication 750 MILLIGRAM(S): at 22:18

## 2024-07-07 RX ADMIN — Medication 5 MILLIGRAM(S): at 22:19

## 2024-07-07 RX ADMIN — METFORMIN HYDROCHLORIDE 1000 MILLIGRAM(S): 850 TABLET ORAL at 22:21

## 2024-07-07 RX ADMIN — Medication 1 TABLET(S): at 22:18

## 2024-07-07 RX ADMIN — Medication 1 MILLIGRAM(S): at 22:17

## 2024-07-08 PROCEDURE — 99232 SBSQ HOSP IP/OBS MODERATE 35: CPT

## 2024-07-08 RX ADMIN — Medication 5 MILLIGRAM(S): at 13:30

## 2024-07-08 RX ADMIN — Medication 5 MILLIGRAM(S): at 21:10

## 2024-07-08 RX ADMIN — METFORMIN HYDROCHLORIDE 1000 MILLIGRAM(S): 850 TABLET ORAL at 21:11

## 2024-07-08 RX ADMIN — Medication 1 TABLET(S): at 21:10

## 2024-07-08 RX ADMIN — Medication 2 TABLET(S): at 21:11

## 2024-07-08 RX ADMIN — Medication 1 MILLIGRAM(S): at 21:11

## 2024-07-08 RX ADMIN — Medication 25 MILLIGRAM(S): at 13:30

## 2024-07-08 RX ADMIN — Medication 750 MILLIGRAM(S): at 21:11

## 2024-07-09 PROCEDURE — 99232 SBSQ HOSP IP/OBS MODERATE 35: CPT

## 2024-07-09 RX ADMIN — METOPROLOL SUCCINATE 50 MILLIGRAM(S): 50 TABLET, EXTENDED RELEASE ORAL at 10:04

## 2024-07-09 RX ADMIN — Medication 750 MILLIGRAM(S): at 21:13

## 2024-07-09 RX ADMIN — Medication 750 MILLIGRAM(S): at 10:03

## 2024-07-09 RX ADMIN — Medication 75 MICROGRAM(S): at 06:14

## 2024-07-09 RX ADMIN — METFORMIN HYDROCHLORIDE 1000 MILLIGRAM(S): 850 TABLET ORAL at 21:13

## 2024-07-09 RX ADMIN — Medication 1 TABLET(S): at 21:13

## 2024-07-09 RX ADMIN — Medication 5 MILLIGRAM(S): at 21:14

## 2024-07-09 RX ADMIN — Medication 5 MILLIGRAM(S): at 10:05

## 2024-07-09 RX ADMIN — Medication 1 MILLIGRAM(S): at 21:14

## 2024-07-09 RX ADMIN — Medication 400 UNIT(S): at 10:04

## 2024-07-09 RX ADMIN — Medication 2 TABLET(S): at 21:13

## 2024-07-09 RX ADMIN — Medication 1 MILLIGRAM(S): at 10:04

## 2024-07-09 RX ADMIN — METFORMIN HYDROCHLORIDE 1000 MILLIGRAM(S): 850 TABLET ORAL at 10:05

## 2024-07-10 PROCEDURE — 99232 SBSQ HOSP IP/OBS MODERATE 35: CPT

## 2024-07-10 RX ORDER — LORAZEPAM 4 MG/ML
0.5 VIAL (ML) INJECTION ONCE
Refills: 0 | Status: DISCONTINUED | OUTPATIENT
Start: 2024-07-10 | End: 2024-07-10

## 2024-07-10 RX ORDER — LORAZEPAM 4 MG/ML
0.5 VIAL (ML) INJECTION ONCE
Refills: 0 | Status: DISCONTINUED | OUTPATIENT
Start: 2024-07-10 | End: 2024-07-16

## 2024-07-10 RX ORDER — FLUPHENAZINE HCL 10 MG
5 TABLET ORAL
Refills: 0 | Status: DISCONTINUED | OUTPATIENT
Start: 2024-07-10 | End: 2024-07-12

## 2024-07-10 RX ORDER — FLUPHENAZINE HCL 10 MG
5 TABLET ORAL
Refills: 0 | Status: DISCONTINUED | OUTPATIENT
Start: 2024-07-10 | End: 2024-07-15

## 2024-07-10 RX ADMIN — METFORMIN HYDROCHLORIDE 1000 MILLIGRAM(S): 850 TABLET ORAL at 09:34

## 2024-07-10 RX ADMIN — Medication 5 MILLIGRAM(S): at 10:04

## 2024-07-10 RX ADMIN — Medication 0.5 MILLIGRAM(S): at 10:04

## 2024-07-10 RX ADMIN — Medication 2 TABLET(S): at 20:24

## 2024-07-10 RX ADMIN — Medication 75 MICROGRAM(S): at 06:01

## 2024-07-10 RX ADMIN — Medication 1 MILLIGRAM(S): at 20:24

## 2024-07-10 RX ADMIN — Medication 1 MILLIGRAM(S): at 09:34

## 2024-07-10 RX ADMIN — Medication 400 UNIT(S): at 09:33

## 2024-07-10 RX ADMIN — Medication 5 MILLIGRAM(S): at 20:24

## 2024-07-10 RX ADMIN — Medication 750 MILLIGRAM(S): at 20:24

## 2024-07-10 RX ADMIN — METFORMIN HYDROCHLORIDE 1000 MILLIGRAM(S): 850 TABLET ORAL at 20:24

## 2024-07-10 RX ADMIN — Medication 1 TABLET(S): at 20:24

## 2024-07-10 RX ADMIN — Medication 750 MILLIGRAM(S): at 09:33

## 2024-07-11 LAB — GLUCOSE BLDC GLUCOMTR-MCNC: 151 MG/DL — HIGH (ref 70–99)

## 2024-07-11 PROCEDURE — 70450 CT HEAD/BRAIN W/O DYE: CPT | Mod: 26

## 2024-07-11 PROCEDURE — 99232 SBSQ HOSP IP/OBS MODERATE 35: CPT

## 2024-07-11 RX ADMIN — Medication 5 MILLIGRAM(S): at 08:20

## 2024-07-11 RX ADMIN — Medication 750 MILLIGRAM(S): at 21:23

## 2024-07-11 RX ADMIN — Medication 75 MICROGRAM(S): at 07:08

## 2024-07-11 RX ADMIN — METFORMIN HYDROCHLORIDE 1000 MILLIGRAM(S): 850 TABLET ORAL at 08:21

## 2024-07-11 RX ADMIN — METOPROLOL SUCCINATE 50 MILLIGRAM(S): 50 TABLET, EXTENDED RELEASE ORAL at 08:20

## 2024-07-11 RX ADMIN — Medication 2 TABLET(S): at 21:23

## 2024-07-11 RX ADMIN — Medication 750 MILLIGRAM(S): at 08:21

## 2024-07-11 RX ADMIN — Medication 1 TABLET(S): at 21:23

## 2024-07-11 RX ADMIN — Medication 400 UNIT(S): at 08:21

## 2024-07-11 RX ADMIN — Medication 5 MILLIGRAM(S): at 21:23

## 2024-07-11 RX ADMIN — Medication 1 MILLIGRAM(S): at 21:23

## 2024-07-11 RX ADMIN — METFORMIN HYDROCHLORIDE 1000 MILLIGRAM(S): 850 TABLET ORAL at 21:23

## 2024-07-11 RX ADMIN — Medication 1 MILLIGRAM(S): at 08:20

## 2024-07-12 PROCEDURE — 99232 SBSQ HOSP IP/OBS MODERATE 35: CPT

## 2024-07-12 RX ORDER — FLUPHENAZINE HCL 10 MG
10 TABLET ORAL DAILY
Refills: 0 | Status: DISCONTINUED | OUTPATIENT
Start: 2024-07-12 | End: 2024-07-12

## 2024-07-12 RX ORDER — FLUPHENAZINE HCL 10 MG
12.5 TABLET ORAL DAILY
Refills: 0 | Status: DISCONTINUED | OUTPATIENT
Start: 2024-07-13 | End: 2024-07-15

## 2024-07-12 RX ORDER — FLUPHENAZINE HCL 10 MG
5 TABLET ORAL AT BEDTIME
Refills: 0 | Status: COMPLETED | OUTPATIENT
Start: 2024-07-12 | End: 2024-07-12

## 2024-07-12 RX ADMIN — Medication 750 MILLIGRAM(S): at 09:33

## 2024-07-12 RX ADMIN — Medication 1 MILLIGRAM(S): at 09:31

## 2024-07-12 RX ADMIN — Medication 1 MILLIGRAM(S): at 22:26

## 2024-07-12 RX ADMIN — Medication 1 TABLET(S): at 22:24

## 2024-07-12 RX ADMIN — Medication 400 UNIT(S): at 09:31

## 2024-07-12 RX ADMIN — Medication 750 MILLIGRAM(S): at 22:23

## 2024-07-12 RX ADMIN — METFORMIN HYDROCHLORIDE 1000 MILLIGRAM(S): 850 TABLET ORAL at 09:31

## 2024-07-12 RX ADMIN — METOPROLOL SUCCINATE 50 MILLIGRAM(S): 50 TABLET, EXTENDED RELEASE ORAL at 09:31

## 2024-07-12 RX ADMIN — Medication 5 MILLIGRAM(S): at 22:24

## 2024-07-12 RX ADMIN — METFORMIN HYDROCHLORIDE 1000 MILLIGRAM(S): 850 TABLET ORAL at 22:23

## 2024-07-12 RX ADMIN — Medication 75 MICROGRAM(S): at 06:14

## 2024-07-12 RX ADMIN — POLYETHYLENE GLYCOL 3350 17 GRAM(S): 17 POWDER, FOR SOLUTION ORAL at 09:33

## 2024-07-12 RX ADMIN — Medication 2 TABLET(S): at 22:24

## 2024-07-12 RX ADMIN — Medication 5 MILLIGRAM(S): at 09:33

## 2024-07-13 PROCEDURE — 99232 SBSQ HOSP IP/OBS MODERATE 35: CPT

## 2024-07-13 RX ADMIN — Medication 1 MILLIGRAM(S): at 09:59

## 2024-07-13 RX ADMIN — METOPROLOL SUCCINATE 50 MILLIGRAM(S): 50 TABLET, EXTENDED RELEASE ORAL at 09:58

## 2024-07-13 RX ADMIN — METFORMIN HYDROCHLORIDE 1000 MILLIGRAM(S): 850 TABLET ORAL at 21:55

## 2024-07-13 RX ADMIN — Medication 12.5 MILLIGRAM(S): at 09:59

## 2024-07-13 RX ADMIN — Medication 2 TABLET(S): at 21:54

## 2024-07-13 RX ADMIN — Medication 750 MILLIGRAM(S): at 09:59

## 2024-07-13 RX ADMIN — Medication 1 TABLET(S): at 21:54

## 2024-07-13 RX ADMIN — Medication 750 MILLIGRAM(S): at 21:54

## 2024-07-13 RX ADMIN — Medication 400 UNIT(S): at 09:59

## 2024-07-13 RX ADMIN — Medication 1 MILLIGRAM(S): at 21:54

## 2024-07-13 RX ADMIN — METFORMIN HYDROCHLORIDE 1000 MILLIGRAM(S): 850 TABLET ORAL at 09:59

## 2024-07-14 RX ADMIN — Medication 12.5 MILLIGRAM(S): at 09:59

## 2024-07-14 RX ADMIN — Medication 75 MICROGRAM(S): at 06:34

## 2024-07-14 RX ADMIN — METFORMIN HYDROCHLORIDE 1000 MILLIGRAM(S): 850 TABLET ORAL at 09:59

## 2024-07-14 RX ADMIN — Medication 1 MILLIGRAM(S): at 21:11

## 2024-07-14 RX ADMIN — Medication 400 UNIT(S): at 09:59

## 2024-07-14 RX ADMIN — Medication 1 MILLIGRAM(S): at 09:59

## 2024-07-14 RX ADMIN — Medication 2 TABLET(S): at 21:10

## 2024-07-14 RX ADMIN — Medication 750 MILLIGRAM(S): at 09:59

## 2024-07-14 RX ADMIN — METFORMIN HYDROCHLORIDE 1000 MILLIGRAM(S): 850 TABLET ORAL at 21:11

## 2024-07-14 RX ADMIN — Medication 1 TABLET(S): at 21:11

## 2024-07-14 RX ADMIN — Medication 750 MILLIGRAM(S): at 21:10

## 2024-07-14 RX ADMIN — METOPROLOL SUCCINATE 50 MILLIGRAM(S): 50 TABLET, EXTENDED RELEASE ORAL at 09:59

## 2024-07-15 PROCEDURE — 99232 SBSQ HOSP IP/OBS MODERATE 35: CPT

## 2024-07-15 RX ORDER — FLUPHENAZINE HCL 10 MG
7.5 TABLET ORAL DAILY
Refills: 0 | Status: DISCONTINUED | OUTPATIENT
Start: 2024-07-15 | End: 2024-08-16

## 2024-07-15 RX ORDER — FLUPHENAZINE HCL 10 MG
15 TABLET ORAL DAILY
Refills: 0 | Status: DISCONTINUED | OUTPATIENT
Start: 2024-07-15 | End: 2024-07-25

## 2024-07-15 RX ADMIN — METFORMIN HYDROCHLORIDE 1000 MILLIGRAM(S): 850 TABLET ORAL at 08:14

## 2024-07-15 RX ADMIN — Medication 750 MILLIGRAM(S): at 21:55

## 2024-07-15 RX ADMIN — METFORMIN HYDROCHLORIDE 1000 MILLIGRAM(S): 850 TABLET ORAL at 21:56

## 2024-07-15 RX ADMIN — Medication 75 MICROGRAM(S): at 06:24

## 2024-07-15 RX ADMIN — Medication 12.5 MILLIGRAM(S): at 08:14

## 2024-07-15 RX ADMIN — METOPROLOL SUCCINATE 50 MILLIGRAM(S): 50 TABLET, EXTENDED RELEASE ORAL at 08:13

## 2024-07-15 RX ADMIN — Medication 400 UNIT(S): at 08:14

## 2024-07-15 RX ADMIN — Medication 1 MILLIGRAM(S): at 08:13

## 2024-07-15 RX ADMIN — Medication 750 MILLIGRAM(S): at 08:13

## 2024-07-15 RX ADMIN — Medication 1 MILLIGRAM(S): at 21:55

## 2024-07-15 RX ADMIN — Medication 1 TABLET(S): at 22:13

## 2024-07-15 RX ADMIN — Medication 2 TABLET(S): at 21:56

## 2024-07-16 PROCEDURE — 99232 SBSQ HOSP IP/OBS MODERATE 35: CPT

## 2024-07-16 RX ORDER — LORAZEPAM 4 MG/ML
0.5 VIAL (ML) INJECTION ONCE
Refills: 0 | Status: DISCONTINUED | OUTPATIENT
Start: 2024-07-16 | End: 2024-07-23

## 2024-07-16 RX ADMIN — METFORMIN HYDROCHLORIDE 1000 MILLIGRAM(S): 850 TABLET ORAL at 10:26

## 2024-07-16 RX ADMIN — Medication 750 MILLIGRAM(S): at 21:53

## 2024-07-16 RX ADMIN — Medication 1 TABLET(S): at 21:54

## 2024-07-16 RX ADMIN — Medication 15 MILLIGRAM(S): at 10:27

## 2024-07-16 RX ADMIN — Medication 1 MILLIGRAM(S): at 10:25

## 2024-07-16 RX ADMIN — Medication 1 MILLIGRAM(S): at 21:53

## 2024-07-16 RX ADMIN — METFORMIN HYDROCHLORIDE 1000 MILLIGRAM(S): 850 TABLET ORAL at 21:54

## 2024-07-16 RX ADMIN — Medication 400 UNIT(S): at 10:25

## 2024-07-16 RX ADMIN — Medication 750 MILLIGRAM(S): at 10:25

## 2024-07-16 RX ADMIN — METOPROLOL SUCCINATE 50 MILLIGRAM(S): 50 TABLET, EXTENDED RELEASE ORAL at 10:26

## 2024-07-16 RX ADMIN — Medication 2 TABLET(S): at 21:54

## 2024-07-17 LAB
ALBUMIN SERPL ELPH-MCNC: 3.7 G/DL — SIGNIFICANT CHANGE UP (ref 3.3–5)
ALP SERPL-CCNC: 92 U/L — SIGNIFICANT CHANGE UP (ref 40–120)
ALT FLD-CCNC: 14 U/L — SIGNIFICANT CHANGE UP (ref 4–33)
ANION GAP SERPL CALC-SCNC: 12 MMOL/L — SIGNIFICANT CHANGE UP (ref 7–14)
APPEARANCE UR: CLEAR — SIGNIFICANT CHANGE UP
AST SERPL-CCNC: 18 U/L — SIGNIFICANT CHANGE UP (ref 4–32)
BASOPHILS # BLD AUTO: 0.04 K/UL — SIGNIFICANT CHANGE UP (ref 0–0.2)
BASOPHILS NFR BLD AUTO: 0.6 % — SIGNIFICANT CHANGE UP (ref 0–2)
BILIRUB SERPL-MCNC: <0.2 MG/DL — SIGNIFICANT CHANGE UP (ref 0.2–1.2)
BILIRUB UR-MCNC: NEGATIVE — SIGNIFICANT CHANGE UP
BUN SERPL-MCNC: 16 MG/DL — SIGNIFICANT CHANGE UP (ref 7–23)
CALCIUM SERPL-MCNC: 9.4 MG/DL — SIGNIFICANT CHANGE UP (ref 8.4–10.5)
CHLORIDE SERPL-SCNC: 105 MMOL/L — SIGNIFICANT CHANGE UP (ref 98–107)
CK SERPL-CCNC: 193 U/L — HIGH (ref 25–170)
CO2 SERPL-SCNC: 23 MMOL/L — SIGNIFICANT CHANGE UP (ref 22–31)
COLOR SPEC: YELLOW — SIGNIFICANT CHANGE UP
CREAT SERPL-MCNC: 0.72 MG/DL — SIGNIFICANT CHANGE UP (ref 0.5–1.3)
DIFF PNL FLD: NEGATIVE — SIGNIFICANT CHANGE UP
EGFR: 89 ML/MIN/1.73M2 — SIGNIFICANT CHANGE UP
EGFR: 89 ML/MIN/1.73M2 — SIGNIFICANT CHANGE UP
EOSINOPHIL # BLD AUTO: 0.18 K/UL — SIGNIFICANT CHANGE UP (ref 0–0.5)
EOSINOPHIL NFR BLD AUTO: 2.7 % — SIGNIFICANT CHANGE UP (ref 0–6)
GLUCOSE SERPL-MCNC: 67 MG/DL — LOW (ref 70–99)
GLUCOSE UR QL: NEGATIVE MG/DL — SIGNIFICANT CHANGE UP
HCT VFR BLD CALC: 35.9 % — SIGNIFICANT CHANGE UP (ref 34.5–45)
HGB BLD-MCNC: 11.3 G/DL — LOW (ref 11.5–15.5)
IANC: 4.21 K/UL — SIGNIFICANT CHANGE UP (ref 1.8–7.4)
IMM GRANULOCYTES NFR BLD AUTO: 0.5 % — SIGNIFICANT CHANGE UP (ref 0–0.9)
KETONES UR-MCNC: NEGATIVE MG/DL — SIGNIFICANT CHANGE UP
LEUKOCYTE ESTERASE UR-ACNC: NEGATIVE — SIGNIFICANT CHANGE UP
LYMPHOCYTES # BLD AUTO: 1.64 K/UL — SIGNIFICANT CHANGE UP (ref 1–3.3)
LYMPHOCYTES # BLD AUTO: 24.7 % — SIGNIFICANT CHANGE UP (ref 13–44)
MCHC RBC-ENTMCNC: 27.1 PG — SIGNIFICANT CHANGE UP (ref 27–34)
MCHC RBC-ENTMCNC: 31.5 GM/DL — LOW (ref 32–36)
MCV RBC AUTO: 86.1 FL — SIGNIFICANT CHANGE UP (ref 80–100)
MONOCYTES # BLD AUTO: 0.53 K/UL — SIGNIFICANT CHANGE UP (ref 0–0.9)
MONOCYTES NFR BLD AUTO: 8 % — SIGNIFICANT CHANGE UP (ref 2–14)
NEUTROPHILS # BLD AUTO: 4.21 K/UL — SIGNIFICANT CHANGE UP (ref 1.8–7.4)
NEUTROPHILS NFR BLD AUTO: 63.5 % — SIGNIFICANT CHANGE UP (ref 43–77)
NITRITE UR-MCNC: NEGATIVE — SIGNIFICANT CHANGE UP
NRBC # BLD AUTO: 0 K/UL — SIGNIFICANT CHANGE UP (ref 0–0)
NRBC # BLD: 0 /100 WBCS — SIGNIFICANT CHANGE UP (ref 0–0)
NRBC # FLD: 0 K/UL — SIGNIFICANT CHANGE UP (ref 0–0)
NRBC BLD-RTO: 0 /100 WBCS — SIGNIFICANT CHANGE UP (ref 0–0)
PH UR: 7 — SIGNIFICANT CHANGE UP (ref 5–8)
PLATELET # BLD AUTO: 226 K/UL — SIGNIFICANT CHANGE UP (ref 150–400)
POTASSIUM SERPL-MCNC: 4.6 MMOL/L — SIGNIFICANT CHANGE UP (ref 3.5–5.3)
POTASSIUM SERPL-SCNC: 4.6 MMOL/L — SIGNIFICANT CHANGE UP (ref 3.5–5.3)
PROT SERPL-MCNC: 7.1 G/DL — SIGNIFICANT CHANGE UP (ref 6–8.3)
PROT UR-MCNC: NEGATIVE MG/DL — SIGNIFICANT CHANGE UP
RBC # BLD: 4.17 M/UL — SIGNIFICANT CHANGE UP (ref 3.8–5.2)
RBC # FLD: 13.6 % — SIGNIFICANT CHANGE UP (ref 10.3–14.5)
SODIUM SERPL-SCNC: 140 MMOL/L — SIGNIFICANT CHANGE UP (ref 135–145)
SP GR SPEC: 1.02 — SIGNIFICANT CHANGE UP (ref 1–1.03)
TSH SERPL-MCNC: 2.75 UIU/ML — SIGNIFICANT CHANGE UP (ref 0.27–4.2)
UROBILINOGEN FLD QL: 0.2 MG/DL — SIGNIFICANT CHANGE UP (ref 0.2–1)
VALPROATE SERPL-MCNC: 89.8 UG/ML — SIGNIFICANT CHANGE UP (ref 50–100)
WBC # BLD: 6.63 K/UL — SIGNIFICANT CHANGE UP (ref 3.8–10.5)
WBC # FLD AUTO: 6.63 K/UL — SIGNIFICANT CHANGE UP (ref 3.8–10.5)

## 2024-07-17 PROCEDURE — 99232 SBSQ HOSP IP/OBS MODERATE 35: CPT

## 2024-07-17 PROCEDURE — 93010 ELECTROCARDIOGRAM REPORT: CPT

## 2024-07-17 RX ADMIN — Medication 1 MILLIGRAM(S): at 08:58

## 2024-07-17 RX ADMIN — METFORMIN HYDROCHLORIDE 1000 MILLIGRAM(S): 850 TABLET ORAL at 20:11

## 2024-07-17 RX ADMIN — METFORMIN HYDROCHLORIDE 1000 MILLIGRAM(S): 850 TABLET ORAL at 08:59

## 2024-07-17 RX ADMIN — Medication 75 MICROGRAM(S): at 06:25

## 2024-07-17 RX ADMIN — Medication 1 TABLET(S): at 20:11

## 2024-07-17 RX ADMIN — Medication 1 MILLIGRAM(S): at 20:11

## 2024-07-17 RX ADMIN — Medication 750 MILLIGRAM(S): at 08:59

## 2024-07-17 RX ADMIN — Medication 750 MILLIGRAM(S): at 20:11

## 2024-07-17 RX ADMIN — Medication 2 TABLET(S): at 20:11

## 2024-07-17 RX ADMIN — Medication 400 UNIT(S): at 08:59

## 2024-07-17 RX ADMIN — Medication 15 MILLIGRAM(S): at 08:59

## 2024-07-17 RX ADMIN — POLYETHYLENE GLYCOL 3350 17 GRAM(S): 17 POWDER, FOR SOLUTION ORAL at 08:59

## 2024-07-18 PROCEDURE — 99232 SBSQ HOSP IP/OBS MODERATE 35: CPT

## 2024-07-18 RX ADMIN — METOPROLOL SUCCINATE 50 MILLIGRAM(S): 50 TABLET, EXTENDED RELEASE ORAL at 09:55

## 2024-07-18 RX ADMIN — METFORMIN HYDROCHLORIDE 1000 MILLIGRAM(S): 850 TABLET ORAL at 09:55

## 2024-07-18 RX ADMIN — Medication 750 MILLIGRAM(S): at 09:55

## 2024-07-18 RX ADMIN — Medication 2 TABLET(S): at 20:32

## 2024-07-18 RX ADMIN — Medication 400 UNIT(S): at 09:55

## 2024-07-18 RX ADMIN — Medication 75 MICROGRAM(S): at 06:12

## 2024-07-18 RX ADMIN — Medication 1 MILLIGRAM(S): at 20:32

## 2024-07-18 RX ADMIN — Medication 750 MILLIGRAM(S): at 20:32

## 2024-07-18 RX ADMIN — Medication 1 TABLET(S): at 20:32

## 2024-07-18 RX ADMIN — METFORMIN HYDROCHLORIDE 1000 MILLIGRAM(S): 850 TABLET ORAL at 20:32

## 2024-07-18 RX ADMIN — Medication 15 MILLIGRAM(S): at 09:56

## 2024-07-18 RX ADMIN — Medication 1 MILLIGRAM(S): at 09:55

## 2024-07-19 LAB
CULTURE RESULTS: SIGNIFICANT CHANGE UP
SPECIMEN SOURCE: SIGNIFICANT CHANGE UP

## 2024-07-19 PROCEDURE — 99232 SBSQ HOSP IP/OBS MODERATE 35: CPT

## 2024-07-19 RX ORDER — QUETIAPINE FUMARATE 25 MG/1
12.5 TABLET ORAL
Refills: 0 | Status: DISCONTINUED | OUTPATIENT
Start: 2024-07-19 | End: 2024-07-20

## 2024-07-19 RX ADMIN — Medication 1 MILLIGRAM(S): at 21:27

## 2024-07-19 RX ADMIN — Medication 15 MILLIGRAM(S): at 09:53

## 2024-07-19 RX ADMIN — Medication 1 TABLET(S): at 21:27

## 2024-07-19 RX ADMIN — Medication 750 MILLIGRAM(S): at 09:52

## 2024-07-19 RX ADMIN — METFORMIN HYDROCHLORIDE 1000 MILLIGRAM(S): 850 TABLET ORAL at 09:52

## 2024-07-19 RX ADMIN — Medication 750 MILLIGRAM(S): at 21:27

## 2024-07-19 RX ADMIN — Medication 75 MICROGRAM(S): at 06:29

## 2024-07-19 RX ADMIN — METOPROLOL SUCCINATE 50 MILLIGRAM(S): 50 TABLET, EXTENDED RELEASE ORAL at 09:52

## 2024-07-19 RX ADMIN — Medication 400 UNIT(S): at 09:52

## 2024-07-19 RX ADMIN — METFORMIN HYDROCHLORIDE 1000 MILLIGRAM(S): 850 TABLET ORAL at 21:27

## 2024-07-19 RX ADMIN — QUETIAPINE FUMARATE 12.5 MILLIGRAM(S): 25 TABLET ORAL at 21:27

## 2024-07-19 RX ADMIN — Medication 1 MILLIGRAM(S): at 09:52

## 2024-07-19 RX ADMIN — Medication 2 TABLET(S): at 21:28

## 2024-07-20 PROCEDURE — 99232 SBSQ HOSP IP/OBS MODERATE 35: CPT

## 2024-07-20 RX ORDER — QUETIAPINE FUMARATE 25 MG/1
25 TABLET ORAL
Refills: 0 | Status: DISCONTINUED | OUTPATIENT
Start: 2024-07-20 | End: 2024-07-25

## 2024-07-20 RX ADMIN — Medication 400 UNIT(S): at 09:32

## 2024-07-20 RX ADMIN — METFORMIN HYDROCHLORIDE 1000 MILLIGRAM(S): 850 TABLET ORAL at 21:13

## 2024-07-20 RX ADMIN — Medication 750 MILLIGRAM(S): at 21:12

## 2024-07-20 RX ADMIN — METFORMIN HYDROCHLORIDE 1000 MILLIGRAM(S): 850 TABLET ORAL at 09:32

## 2024-07-20 RX ADMIN — METOPROLOL SUCCINATE 50 MILLIGRAM(S): 50 TABLET, EXTENDED RELEASE ORAL at 09:32

## 2024-07-20 RX ADMIN — Medication 750 MILLIGRAM(S): at 09:31

## 2024-07-20 RX ADMIN — QUETIAPINE FUMARATE 25 MILLIGRAM(S): 25 TABLET ORAL at 21:13

## 2024-07-20 RX ADMIN — QUETIAPINE FUMARATE 12.5 MILLIGRAM(S): 25 TABLET ORAL at 09:32

## 2024-07-20 RX ADMIN — Medication 1 MILLIGRAM(S): at 09:31

## 2024-07-20 RX ADMIN — Medication 1 MILLIGRAM(S): at 21:13

## 2024-07-20 RX ADMIN — Medication 15 MILLIGRAM(S): at 09:32

## 2024-07-20 RX ADMIN — Medication 2 TABLET(S): at 21:13

## 2024-07-20 RX ADMIN — Medication 1 TABLET(S): at 21:13

## 2024-07-21 PROCEDURE — 99232 SBSQ HOSP IP/OBS MODERATE 35: CPT

## 2024-07-21 RX ADMIN — METFORMIN HYDROCHLORIDE 1000 MILLIGRAM(S): 850 TABLET ORAL at 21:42

## 2024-07-21 RX ADMIN — Medication 1 MILLIGRAM(S): at 09:53

## 2024-07-21 RX ADMIN — Medication 15 MILLIGRAM(S): at 09:53

## 2024-07-21 RX ADMIN — QUETIAPINE FUMARATE 25 MILLIGRAM(S): 25 TABLET ORAL at 09:53

## 2024-07-21 RX ADMIN — Medication 1 TABLET(S): at 21:42

## 2024-07-21 RX ADMIN — Medication 2 TABLET(S): at 21:42

## 2024-07-21 RX ADMIN — Medication 750 MILLIGRAM(S): at 09:54

## 2024-07-21 RX ADMIN — Medication 750 MILLIGRAM(S): at 21:41

## 2024-07-21 RX ADMIN — Medication 1 MILLIGRAM(S): at 21:42

## 2024-07-21 RX ADMIN — QUETIAPINE FUMARATE 25 MILLIGRAM(S): 25 TABLET ORAL at 21:42

## 2024-07-22 RX ADMIN — Medication 2 TABLET(S): at 22:34

## 2024-07-22 RX ADMIN — Medication 400 UNIT(S): at 10:33

## 2024-07-22 RX ADMIN — QUETIAPINE FUMARATE 25 MILLIGRAM(S): 25 TABLET ORAL at 22:31

## 2024-07-22 RX ADMIN — Medication 750 MILLIGRAM(S): at 22:32

## 2024-07-22 RX ADMIN — Medication 750 MILLIGRAM(S): at 10:34

## 2024-07-22 RX ADMIN — METFORMIN HYDROCHLORIDE 1000 MILLIGRAM(S): 850 TABLET ORAL at 10:33

## 2024-07-22 RX ADMIN — Medication 1 MILLIGRAM(S): at 22:30

## 2024-07-22 RX ADMIN — QUETIAPINE FUMARATE 25 MILLIGRAM(S): 25 TABLET ORAL at 10:32

## 2024-07-22 RX ADMIN — Medication 15 MILLIGRAM(S): at 10:35

## 2024-07-22 RX ADMIN — METFORMIN HYDROCHLORIDE 1000 MILLIGRAM(S): 850 TABLET ORAL at 22:31

## 2024-07-22 RX ADMIN — Medication 1 MILLIGRAM(S): at 10:34

## 2024-07-22 RX ADMIN — Medication 1 TABLET(S): at 22:31

## 2024-07-22 RX ADMIN — Medication 75 MICROGRAM(S): at 05:19

## 2024-07-23 PROCEDURE — 99232 SBSQ HOSP IP/OBS MODERATE 35: CPT

## 2024-07-23 RX ORDER — QUETIAPINE FUMARATE 25 MG/1
25 TABLET ORAL AT BEDTIME
Refills: 0 | Status: DISCONTINUED | OUTPATIENT
Start: 2024-07-23 | End: 2024-07-23

## 2024-07-23 RX ORDER — QUETIAPINE FUMARATE 25 MG/1
25 TABLET ORAL ONCE
Refills: 0 | Status: COMPLETED | OUTPATIENT
Start: 2024-07-23 | End: 2024-07-23

## 2024-07-23 RX ADMIN — QUETIAPINE FUMARATE 25 MILLIGRAM(S): 25 TABLET ORAL at 21:42

## 2024-07-23 RX ADMIN — Medication 1 MILLIGRAM(S): at 21:42

## 2024-07-23 RX ADMIN — Medication 1 MILLIGRAM(S): at 09:18

## 2024-07-23 RX ADMIN — Medication 750 MILLIGRAM(S): at 09:17

## 2024-07-23 RX ADMIN — METFORMIN HYDROCHLORIDE 1000 MILLIGRAM(S): 850 TABLET ORAL at 09:17

## 2024-07-23 RX ADMIN — METFORMIN HYDROCHLORIDE 1000 MILLIGRAM(S): 850 TABLET ORAL at 21:42

## 2024-07-23 RX ADMIN — POLYETHYLENE GLYCOL 3350 17 GRAM(S): 17 POWDER, FOR SOLUTION ORAL at 09:20

## 2024-07-23 RX ADMIN — QUETIAPINE FUMARATE 25 MILLIGRAM(S): 25 TABLET ORAL at 01:05

## 2024-07-23 RX ADMIN — Medication 750 MILLIGRAM(S): at 21:42

## 2024-07-23 RX ADMIN — Medication 400 UNIT(S): at 09:18

## 2024-07-23 RX ADMIN — Medication 75 MICROGRAM(S): at 06:28

## 2024-07-23 RX ADMIN — Medication 1 TABLET(S): at 21:43

## 2024-07-23 RX ADMIN — QUETIAPINE FUMARATE 25 MILLIGRAM(S): 25 TABLET ORAL at 09:18

## 2024-07-23 RX ADMIN — Medication 2 TABLET(S): at 21:42

## 2024-07-23 RX ADMIN — Medication 15 MILLIGRAM(S): at 09:18

## 2024-07-24 PROCEDURE — 99232 SBSQ HOSP IP/OBS MODERATE 35: CPT

## 2024-07-24 RX ADMIN — Medication 2 TABLET(S): at 22:44

## 2024-07-24 RX ADMIN — METFORMIN HYDROCHLORIDE 1000 MILLIGRAM(S): 850 TABLET ORAL at 09:39

## 2024-07-24 RX ADMIN — Medication 1 TABLET(S): at 22:44

## 2024-07-24 RX ADMIN — Medication 750 MILLIGRAM(S): at 22:43

## 2024-07-24 RX ADMIN — Medication 400 UNIT(S): at 09:39

## 2024-07-24 RX ADMIN — QUETIAPINE FUMARATE 25 MILLIGRAM(S): 25 TABLET ORAL at 22:46

## 2024-07-24 RX ADMIN — Medication 1 MILLIGRAM(S): at 22:43

## 2024-07-24 RX ADMIN — Medication 75 MICROGRAM(S): at 06:08

## 2024-07-24 RX ADMIN — Medication 1 MILLIGRAM(S): at 09:39

## 2024-07-24 RX ADMIN — Medication 750 MILLIGRAM(S): at 09:38

## 2024-07-24 RX ADMIN — POLYETHYLENE GLYCOL 3350 17 GRAM(S): 17 POWDER, FOR SOLUTION ORAL at 09:38

## 2024-07-24 RX ADMIN — QUETIAPINE FUMARATE 25 MILLIGRAM(S): 25 TABLET ORAL at 09:39

## 2024-07-24 RX ADMIN — METFORMIN HYDROCHLORIDE 1000 MILLIGRAM(S): 850 TABLET ORAL at 22:44

## 2024-07-24 RX ADMIN — Medication 15 MILLIGRAM(S): at 09:38

## 2024-07-25 PROCEDURE — 99232 SBSQ HOSP IP/OBS MODERATE 35: CPT

## 2024-07-25 RX ORDER — QUETIAPINE FUMARATE 25 MG/1
50 TABLET ORAL AT BEDTIME
Refills: 0 | Status: DISCONTINUED | OUTPATIENT
Start: 2024-07-26 | End: 2024-07-31

## 2024-07-25 RX ORDER — FLUPHENAZINE HCL 10 MG
20 TABLET ORAL DAILY
Refills: 0 | Status: DISCONTINUED | OUTPATIENT
Start: 2024-07-25 | End: 2024-07-26

## 2024-07-25 RX ORDER — QUETIAPINE FUMARATE 25 MG/1
25 TABLET ORAL
Refills: 0 | Status: DISCONTINUED | OUTPATIENT
Start: 2024-07-25 | End: 2024-07-25

## 2024-07-25 RX ADMIN — Medication 1 MILLIGRAM(S): at 09:34

## 2024-07-25 RX ADMIN — Medication 750 MILLIGRAM(S): at 09:34

## 2024-07-25 RX ADMIN — Medication 750 MILLIGRAM(S): at 20:48

## 2024-07-25 RX ADMIN — Medication 1 TABLET(S): at 20:48

## 2024-07-25 RX ADMIN — Medication 15 MILLIGRAM(S): at 09:34

## 2024-07-25 RX ADMIN — QUETIAPINE FUMARATE 25 MILLIGRAM(S): 25 TABLET ORAL at 09:34

## 2024-07-25 RX ADMIN — Medication 1 MILLIGRAM(S): at 20:49

## 2024-07-25 RX ADMIN — Medication 75 MICROGRAM(S): at 05:55

## 2024-07-25 RX ADMIN — METFORMIN HYDROCHLORIDE 1000 MILLIGRAM(S): 850 TABLET ORAL at 20:49

## 2024-07-26 PROCEDURE — 99232 SBSQ HOSP IP/OBS MODERATE 35: CPT

## 2024-07-26 RX ORDER — FLUPHENAZINE HCL 10 MG
15 TABLET ORAL DAILY
Refills: 0 | Status: DISCONTINUED | OUTPATIENT
Start: 2024-07-26 | End: 2024-08-16

## 2024-07-26 RX ADMIN — METFORMIN HYDROCHLORIDE 1000 MILLIGRAM(S): 850 TABLET ORAL at 10:13

## 2024-07-26 RX ADMIN — Medication 2 TABLET(S): at 20:21

## 2024-07-26 RX ADMIN — Medication 1 MILLIGRAM(S): at 20:20

## 2024-07-26 RX ADMIN — Medication 1 TABLET(S): at 20:20

## 2024-07-26 RX ADMIN — Medication 750 MILLIGRAM(S): at 20:20

## 2024-07-26 RX ADMIN — Medication 1 MILLIGRAM(S): at 10:13

## 2024-07-26 RX ADMIN — Medication 750 MILLIGRAM(S): at 10:13

## 2024-07-26 RX ADMIN — Medication 20 MILLIGRAM(S): at 10:13

## 2024-07-26 RX ADMIN — Medication 75 MICROGRAM(S): at 06:35

## 2024-07-26 RX ADMIN — METFORMIN HYDROCHLORIDE 1000 MILLIGRAM(S): 850 TABLET ORAL at 20:20

## 2024-07-26 RX ADMIN — QUETIAPINE FUMARATE 50 MILLIGRAM(S): 25 TABLET ORAL at 20:21

## 2024-07-27 RX ADMIN — Medication 750 MILLIGRAM(S): at 10:35

## 2024-07-27 RX ADMIN — METFORMIN HYDROCHLORIDE 1000 MILLIGRAM(S): 850 TABLET ORAL at 10:36

## 2024-07-27 RX ADMIN — Medication 75 MICROGRAM(S): at 06:16

## 2024-07-27 RX ADMIN — Medication 1 TABLET(S): at 20:07

## 2024-07-27 RX ADMIN — METOPROLOL SUCCINATE 50 MILLIGRAM(S): 50 TABLET, EXTENDED RELEASE ORAL at 10:36

## 2024-07-27 RX ADMIN — Medication 400 UNIT(S): at 10:36

## 2024-07-27 RX ADMIN — QUETIAPINE FUMARATE 50 MILLIGRAM(S): 25 TABLET ORAL at 20:07

## 2024-07-27 RX ADMIN — Medication 2 TABLET(S): at 20:07

## 2024-07-27 RX ADMIN — Medication 1 MILLIGRAM(S): at 20:07

## 2024-07-27 RX ADMIN — Medication 15 MILLIGRAM(S): at 10:36

## 2024-07-27 RX ADMIN — Medication 1 MILLIGRAM(S): at 10:35

## 2024-07-27 RX ADMIN — Medication 750 MILLIGRAM(S): at 20:07

## 2024-07-27 RX ADMIN — METFORMIN HYDROCHLORIDE 1000 MILLIGRAM(S): 850 TABLET ORAL at 20:07

## 2024-07-28 RX ADMIN — Medication 1 TABLET(S): at 22:08

## 2024-07-28 RX ADMIN — QUETIAPINE FUMARATE 50 MILLIGRAM(S): 25 TABLET ORAL at 22:08

## 2024-07-28 RX ADMIN — Medication 1 MILLIGRAM(S): at 22:08

## 2024-07-28 RX ADMIN — Medication 750 MILLIGRAM(S): at 22:09

## 2024-07-28 RX ADMIN — Medication 75 MICROGRAM(S): at 06:15

## 2024-07-28 RX ADMIN — Medication 7.5 MILLIGRAM(S): at 10:51

## 2024-07-28 RX ADMIN — METFORMIN HYDROCHLORIDE 1000 MILLIGRAM(S): 850 TABLET ORAL at 22:09

## 2024-07-29 PROCEDURE — 99232 SBSQ HOSP IP/OBS MODERATE 35: CPT

## 2024-07-29 RX ADMIN — Medication 1 MILLIGRAM(S): at 08:21

## 2024-07-29 RX ADMIN — METFORMIN HYDROCHLORIDE 1000 MILLIGRAM(S): 850 TABLET ORAL at 08:21

## 2024-07-29 RX ADMIN — Medication 1 MILLIGRAM(S): at 22:35

## 2024-07-29 RX ADMIN — METFORMIN HYDROCHLORIDE 1000 MILLIGRAM(S): 850 TABLET ORAL at 23:05

## 2024-07-29 RX ADMIN — Medication 15 MILLIGRAM(S): at 09:21

## 2024-07-29 RX ADMIN — Medication 400 UNIT(S): at 08:21

## 2024-07-29 RX ADMIN — QUETIAPINE FUMARATE 50 MILLIGRAM(S): 25 TABLET ORAL at 22:35

## 2024-07-29 RX ADMIN — METOPROLOL SUCCINATE 50 MILLIGRAM(S): 50 TABLET, EXTENDED RELEASE ORAL at 08:21

## 2024-07-29 RX ADMIN — Medication 75 MICROGRAM(S): at 06:41

## 2024-07-29 RX ADMIN — Medication 1 TABLET(S): at 22:36

## 2024-07-29 RX ADMIN — Medication 750 MILLIGRAM(S): at 08:22

## 2024-07-29 RX ADMIN — Medication 750 MILLIGRAM(S): at 22:35

## 2024-07-29 NOTE — ED ADULT NURSE NOTE - NS ED NURSE REPORT GIVEN DT
facility-administered medications for this visit.     Past Medical History:   Diagnosis Date    CAD (coronary artery disease)     Cancer (HCC)     basal cell    Hx of blood clots     Hyperlipidemia     Hypertension     Personal history of DVT (deep vein thrombosis)     Pulmonary fibrosis (HCC)     Thyroid disease     Transverse myelitis (HCC)       Past Surgical History:   Procedure Laterality Date    ANGIOPLASTY  , , , , ,     APPENDECTOMY  1938    BACK SURGERY  1995 x2, , ,     CARDIAC CATHETERIZATION  , ,     CAROTID ARTERY SURGERY Left     CERVICAL FUSION  [id number]    CERVICAL FUSION N/A 2019    ACDF C6-7 AND BIOPSY OF DISC C6-7 performed by Lea Macias MD at [facility] OR    CORONARY ANGIOPLASTY WITH STENT PLACEMENT  -2002    x3    FINGER AMPUTATION      right hand    JOINT REPLACEMENT Right     LARYNGOSCOPY N/A 2024    Laryngoscopy with Arytenoidectomy, Laryngoplasty with Flap performed by Daryl Duenas MD at [facility] OR    SHOULDER SURGERY      TONSILLECTOMY AND ADENOIDECTOMY      UPPER GASTROINTESTINAL ENDOSCOPY Left 2019    EGD ESOPHAGOGASTRODUODENOSCOPY performed by Minerva Hermosillo MD at [facility] Endoscopy    UPPER GASTROINTESTINAL ENDOSCOPY Left 2019    EGD DILATION SAVORY performed by Jonathon Kern MD at [facility] Endoscopy    UPPER GASTROINTESTINAL ENDOSCOPY Left 2019    EGD BIOPSY performed by Jonathon Kern MD at [facility] Endoscopy     Family History   Problem Relation Age of Onset    Diabetes Mother     Stroke Mother     Heart Disease Father      Social History     Tobacco Use    Smoking status: Former     Types: Cigars     Quit date:      Years since quittin.6    Smokeless tobacco: Former     Quit date: 1967   Substance Use Topics    Alcohol use: Yes     Comment: wine occas        Subjective:      Review of Systems  Rest of review of systems are negative, except as noted in HPI. 
18-May-2024 02:35

## 2024-07-30 PROCEDURE — 99232 SBSQ HOSP IP/OBS MODERATE 35: CPT

## 2024-07-30 RX ADMIN — Medication 75 MICROGRAM(S): at 06:11

## 2024-07-30 RX ADMIN — Medication 1 TABLET(S): at 21:46

## 2024-07-30 RX ADMIN — METOPROLOL SUCCINATE 50 MILLIGRAM(S): 50 TABLET, EXTENDED RELEASE ORAL at 10:15

## 2024-07-30 RX ADMIN — Medication 1 MILLIGRAM(S): at 21:46

## 2024-07-30 RX ADMIN — QUETIAPINE FUMARATE 50 MILLIGRAM(S): 25 TABLET ORAL at 21:46

## 2024-07-30 RX ADMIN — Medication 2 TABLET(S): at 21:46

## 2024-07-30 RX ADMIN — Medication 400 UNIT(S): at 10:15

## 2024-07-30 RX ADMIN — METFORMIN HYDROCHLORIDE 1000 MILLIGRAM(S): 850 TABLET ORAL at 10:15

## 2024-07-30 RX ADMIN — Medication 750 MILLIGRAM(S): at 10:15

## 2024-07-30 RX ADMIN — Medication 15 MILLIGRAM(S): at 10:14

## 2024-07-30 RX ADMIN — Medication 750 MILLIGRAM(S): at 21:46

## 2024-07-30 RX ADMIN — METFORMIN HYDROCHLORIDE 1000 MILLIGRAM(S): 850 TABLET ORAL at 21:47

## 2024-07-30 RX ADMIN — Medication 1 MILLIGRAM(S): at 10:15

## 2024-07-31 PROCEDURE — 99232 SBSQ HOSP IP/OBS MODERATE 35: CPT

## 2024-07-31 RX ORDER — QUETIAPINE FUMARATE 25 MG/1
75 TABLET ORAL AT BEDTIME
Refills: 0 | Status: DISCONTINUED | OUTPATIENT
Start: 2024-07-31 | End: 2024-08-06

## 2024-07-31 RX ADMIN — Medication 400 UNIT(S): at 10:30

## 2024-07-31 RX ADMIN — Medication 1 MILLIGRAM(S): at 21:09

## 2024-07-31 RX ADMIN — QUETIAPINE FUMARATE 75 MILLIGRAM(S): 25 TABLET ORAL at 21:09

## 2024-07-31 RX ADMIN — Medication 1 TABLET(S): at 21:08

## 2024-07-31 RX ADMIN — Medication 1 MILLIGRAM(S): at 10:30

## 2024-07-31 RX ADMIN — METFORMIN HYDROCHLORIDE 1000 MILLIGRAM(S): 850 TABLET ORAL at 21:08

## 2024-07-31 RX ADMIN — Medication 750 MILLIGRAM(S): at 21:08

## 2024-07-31 RX ADMIN — METFORMIN HYDROCHLORIDE 1000 MILLIGRAM(S): 850 TABLET ORAL at 10:30

## 2024-07-31 RX ADMIN — Medication 2 TABLET(S): at 21:08

## 2024-07-31 RX ADMIN — Medication 750 MILLIGRAM(S): at 10:31

## 2024-07-31 RX ADMIN — Medication 15 MILLIGRAM(S): at 10:30

## 2024-07-31 RX ADMIN — Medication 75 MICROGRAM(S): at 06:13

## 2024-08-01 PROCEDURE — 99232 SBSQ HOSP IP/OBS MODERATE 35: CPT

## 2024-08-01 RX ADMIN — Medication 1 TABLET(S): at 21:39

## 2024-08-01 RX ADMIN — Medication 400 UNIT(S): at 10:35

## 2024-08-01 RX ADMIN — Medication 750 MILLIGRAM(S): at 21:38

## 2024-08-01 RX ADMIN — Medication 1 MILLIGRAM(S): at 10:36

## 2024-08-01 RX ADMIN — Medication 1 MILLIGRAM(S): at 21:39

## 2024-08-01 RX ADMIN — Medication 750 MILLIGRAM(S): at 10:36

## 2024-08-01 RX ADMIN — METFORMIN HYDROCHLORIDE 1000 MILLIGRAM(S): 850 TABLET ORAL at 10:35

## 2024-08-01 RX ADMIN — Medication 15 MILLIGRAM(S): at 10:36

## 2024-08-01 RX ADMIN — QUETIAPINE FUMARATE 75 MILLIGRAM(S): 25 TABLET ORAL at 21:38

## 2024-08-01 RX ADMIN — Medication 75 MICROGRAM(S): at 06:34

## 2024-08-01 RX ADMIN — METFORMIN HYDROCHLORIDE 1000 MILLIGRAM(S): 850 TABLET ORAL at 21:39

## 2024-08-01 RX ADMIN — Medication 2 TABLET(S): at 21:39

## 2024-08-02 PROCEDURE — 99232 SBSQ HOSP IP/OBS MODERATE 35: CPT

## 2024-08-02 RX ADMIN — Medication 1 TABLET(S): at 20:10

## 2024-08-02 RX ADMIN — Medication 750 MILLIGRAM(S): at 20:10

## 2024-08-02 RX ADMIN — Medication 7.5 MILLIGRAM(S): at 09:45

## 2024-08-02 RX ADMIN — QUETIAPINE FUMARATE 75 MILLIGRAM(S): 25 TABLET ORAL at 20:10

## 2024-08-02 RX ADMIN — Medication 2 TABLET(S): at 20:10

## 2024-08-02 RX ADMIN — METFORMIN HYDROCHLORIDE 1000 MILLIGRAM(S): 850 TABLET ORAL at 20:10

## 2024-08-02 RX ADMIN — Medication 75 MICROGRAM(S): at 06:59

## 2024-08-02 RX ADMIN — Medication 1 MILLIGRAM(S): at 20:10

## 2024-08-03 PROCEDURE — 99231 SBSQ HOSP IP/OBS SF/LOW 25: CPT

## 2024-08-03 RX ADMIN — Medication 15 MILLIGRAM(S): at 09:34

## 2024-08-03 RX ADMIN — Medication 75 MICROGRAM(S): at 06:00

## 2024-08-03 RX ADMIN — Medication 750 MILLIGRAM(S): at 21:28

## 2024-08-03 RX ADMIN — Medication 2 TABLET(S): at 21:27

## 2024-08-03 RX ADMIN — METFORMIN HYDROCHLORIDE 1000 MILLIGRAM(S): 850 TABLET ORAL at 21:28

## 2024-08-03 RX ADMIN — Medication 400 UNIT(S): at 09:34

## 2024-08-03 RX ADMIN — Medication 1 MILLIGRAM(S): at 21:28

## 2024-08-03 RX ADMIN — QUETIAPINE FUMARATE 75 MILLIGRAM(S): 25 TABLET ORAL at 21:28

## 2024-08-03 RX ADMIN — Medication 1 MILLIGRAM(S): at 09:33

## 2024-08-03 RX ADMIN — Medication 1 TABLET(S): at 21:28

## 2024-08-03 RX ADMIN — METFORMIN HYDROCHLORIDE 1000 MILLIGRAM(S): 850 TABLET ORAL at 09:33

## 2024-08-03 RX ADMIN — Medication 750 MILLIGRAM(S): at 09:34

## 2024-08-04 RX ADMIN — Medication 1 MILLIGRAM(S): at 21:54

## 2024-08-04 RX ADMIN — Medication 1 MILLIGRAM(S): at 09:39

## 2024-08-04 RX ADMIN — Medication 400 UNIT(S): at 09:38

## 2024-08-04 RX ADMIN — Medication 750 MILLIGRAM(S): at 09:38

## 2024-08-04 RX ADMIN — QUETIAPINE FUMARATE 75 MILLIGRAM(S): 25 TABLET ORAL at 21:54

## 2024-08-04 RX ADMIN — Medication 750 MILLIGRAM(S): at 21:53

## 2024-08-04 RX ADMIN — METFORMIN HYDROCHLORIDE 1000 MILLIGRAM(S): 850 TABLET ORAL at 09:38

## 2024-08-04 RX ADMIN — Medication 1 TABLET(S): at 21:54

## 2024-08-04 RX ADMIN — Medication 15 MILLIGRAM(S): at 09:38

## 2024-08-04 RX ADMIN — METFORMIN HYDROCHLORIDE 1000 MILLIGRAM(S): 850 TABLET ORAL at 21:55

## 2024-08-04 RX ADMIN — Medication 2 TABLET(S): at 21:55

## 2024-08-05 PROCEDURE — 99231 SBSQ HOSP IP/OBS SF/LOW 25: CPT

## 2024-08-05 RX ADMIN — Medication 2 TABLET(S): at 21:51

## 2024-08-05 RX ADMIN — Medication 1 MILLIGRAM(S): at 09:47

## 2024-08-05 RX ADMIN — METFORMIN HYDROCHLORIDE 1000 MILLIGRAM(S): 850 TABLET ORAL at 09:49

## 2024-08-05 RX ADMIN — QUETIAPINE FUMARATE 75 MILLIGRAM(S): 25 TABLET ORAL at 21:51

## 2024-08-05 RX ADMIN — Medication 15 MILLIGRAM(S): at 11:41

## 2024-08-05 RX ADMIN — Medication 750 MILLIGRAM(S): at 21:51

## 2024-08-05 RX ADMIN — Medication 1 MILLIGRAM(S): at 21:51

## 2024-08-05 RX ADMIN — Medication 1 TABLET(S): at 21:51

## 2024-08-05 RX ADMIN — METFORMIN HYDROCHLORIDE 1000 MILLIGRAM(S): 850 TABLET ORAL at 21:51

## 2024-08-05 RX ADMIN — Medication 400 UNIT(S): at 09:47

## 2024-08-05 RX ADMIN — Medication 750 MILLIGRAM(S): at 09:47

## 2024-08-05 RX ADMIN — Medication 75 MICROGRAM(S): at 06:56

## 2024-08-06 PROCEDURE — 99232 SBSQ HOSP IP/OBS MODERATE 35: CPT

## 2024-08-06 RX ORDER — FLUPHENAZINE HCL 10 MG
12.5 TABLET ORAL ONCE
Refills: 0 | Status: COMPLETED | OUTPATIENT
Start: 2024-08-06 | End: 2024-08-06

## 2024-08-06 RX ORDER — QUETIAPINE FUMARATE 25 MG/1
100 TABLET ORAL AT BEDTIME
Refills: 0 | Status: DISCONTINUED | OUTPATIENT
Start: 2024-08-06 | End: 2024-08-13

## 2024-08-06 RX ADMIN — Medication 15 MILLIGRAM(S): at 16:14

## 2024-08-06 RX ADMIN — Medication 12.5 MILLIGRAM(S): at 16:12

## 2024-08-06 RX ADMIN — Medication 1 MILLIGRAM(S): at 16:14

## 2024-08-06 RX ADMIN — METOPROLOL SUCCINATE 50 MILLIGRAM(S): 50 TABLET, EXTENDED RELEASE ORAL at 16:14

## 2024-08-06 RX ADMIN — METFORMIN HYDROCHLORIDE 1000 MILLIGRAM(S): 850 TABLET ORAL at 16:13

## 2024-08-06 RX ADMIN — Medication 750 MILLIGRAM(S): at 16:13

## 2024-08-06 RX ADMIN — Medication 400 UNIT(S): at 16:14

## 2024-08-06 RX ADMIN — Medication 1 TABLET(S): at 21:49

## 2024-08-06 RX ADMIN — METFORMIN HYDROCHLORIDE 1000 MILLIGRAM(S): 850 TABLET ORAL at 21:49

## 2024-08-06 RX ADMIN — QUETIAPINE FUMARATE 100 MILLIGRAM(S): 25 TABLET ORAL at 21:45

## 2024-08-06 RX ADMIN — Medication 1 MILLIGRAM(S): at 21:48

## 2024-08-07 PROCEDURE — 99232 SBSQ HOSP IP/OBS MODERATE 35: CPT

## 2024-08-07 RX ORDER — SIMETHICONE 80 MG
80 TABLET,CHEWABLE ORAL EVERY 8 HOURS
Refills: 0 | Status: DISCONTINUED | OUTPATIENT
Start: 2024-08-07 | End: 2024-09-28

## 2024-08-07 RX ORDER — SENNA 187 MG
2 TABLET ORAL EVERY 24 HOURS
Refills: 0 | Status: DISCONTINUED | OUTPATIENT
Start: 2024-08-07 | End: 2024-08-16

## 2024-08-07 RX ADMIN — METFORMIN HYDROCHLORIDE 1000 MILLIGRAM(S): 850 TABLET ORAL at 09:55

## 2024-08-07 RX ADMIN — Medication 15 MILLIGRAM(S): at 09:55

## 2024-08-07 RX ADMIN — Medication 1 MILLIGRAM(S): at 20:55

## 2024-08-07 RX ADMIN — Medication 750 MILLIGRAM(S): at 20:54

## 2024-08-07 RX ADMIN — METOPROLOL SUCCINATE 50 MILLIGRAM(S): 50 TABLET, EXTENDED RELEASE ORAL at 09:55

## 2024-08-07 RX ADMIN — Medication 1 MILLIGRAM(S): at 09:55

## 2024-08-07 RX ADMIN — METFORMIN HYDROCHLORIDE 1000 MILLIGRAM(S): 850 TABLET ORAL at 20:54

## 2024-08-07 RX ADMIN — Medication 750 MILLIGRAM(S): at 09:55

## 2024-08-07 RX ADMIN — Medication 1 TABLET(S): at 20:55

## 2024-08-07 RX ADMIN — Medication 75 MICROGRAM(S): at 06:53

## 2024-08-07 RX ADMIN — QUETIAPINE FUMARATE 100 MILLIGRAM(S): 25 TABLET ORAL at 20:54

## 2024-08-07 RX ADMIN — Medication 400 UNIT(S): at 09:54

## 2024-08-08 PROCEDURE — 99232 SBSQ HOSP IP/OBS MODERATE 35: CPT

## 2024-08-08 PROCEDURE — 99233 SBSQ HOSP IP/OBS HIGH 50: CPT

## 2024-08-08 RX ADMIN — Medication 400 UNIT(S): at 09:50

## 2024-08-08 RX ADMIN — Medication 75 MICROGRAM(S): at 06:14

## 2024-08-08 RX ADMIN — Medication 750 MILLIGRAM(S): at 20:10

## 2024-08-08 RX ADMIN — Medication 1 TABLET(S): at 20:10

## 2024-08-08 RX ADMIN — METFORMIN HYDROCHLORIDE 1000 MILLIGRAM(S): 850 TABLET ORAL at 09:50

## 2024-08-08 RX ADMIN — METOPROLOL SUCCINATE 50 MILLIGRAM(S): 50 TABLET, EXTENDED RELEASE ORAL at 09:50

## 2024-08-08 RX ADMIN — Medication 1 MILLIGRAM(S): at 09:50

## 2024-08-08 RX ADMIN — Medication 750 MILLIGRAM(S): at 09:50

## 2024-08-08 RX ADMIN — QUETIAPINE FUMARATE 100 MILLIGRAM(S): 25 TABLET ORAL at 20:10

## 2024-08-08 RX ADMIN — Medication 1 MILLIGRAM(S): at 20:10

## 2024-08-08 RX ADMIN — Medication 15 MILLIGRAM(S): at 09:50

## 2024-08-08 RX ADMIN — METFORMIN HYDROCHLORIDE 1000 MILLIGRAM(S): 850 TABLET ORAL at 20:10

## 2024-08-09 PROCEDURE — 99232 SBSQ HOSP IP/OBS MODERATE 35: CPT

## 2024-08-09 RX ADMIN — METOPROLOL SUCCINATE 50 MILLIGRAM(S): 50 TABLET, EXTENDED RELEASE ORAL at 10:06

## 2024-08-09 RX ADMIN — Medication 750 MILLIGRAM(S): at 10:05

## 2024-08-09 RX ADMIN — Medication 1 MILLIGRAM(S): at 10:06

## 2024-08-09 RX ADMIN — Medication 1 TABLET(S): at 20:06

## 2024-08-09 RX ADMIN — METFORMIN HYDROCHLORIDE 1000 MILLIGRAM(S): 850 TABLET ORAL at 20:06

## 2024-08-09 RX ADMIN — Medication 400 UNIT(S): at 10:05

## 2024-08-09 RX ADMIN — Medication 1 MILLIGRAM(S): at 20:07

## 2024-08-09 RX ADMIN — METFORMIN HYDROCHLORIDE 1000 MILLIGRAM(S): 850 TABLET ORAL at 10:06

## 2024-08-09 RX ADMIN — QUETIAPINE FUMARATE 100 MILLIGRAM(S): 25 TABLET ORAL at 20:07

## 2024-08-09 RX ADMIN — Medication 75 MICROGRAM(S): at 06:12

## 2024-08-09 RX ADMIN — Medication 750 MILLIGRAM(S): at 20:06

## 2024-08-09 RX ADMIN — Medication 15 MILLIGRAM(S): at 10:05

## 2024-08-10 RX ADMIN — Medication 750 MILLIGRAM(S): at 09:42

## 2024-08-10 RX ADMIN — Medication 75 MICROGRAM(S): at 06:14

## 2024-08-10 RX ADMIN — Medication 15 MILLIGRAM(S): at 09:41

## 2024-08-10 RX ADMIN — METOPROLOL SUCCINATE 50 MILLIGRAM(S): 50 TABLET, EXTENDED RELEASE ORAL at 09:42

## 2024-08-10 RX ADMIN — METFORMIN HYDROCHLORIDE 1000 MILLIGRAM(S): 850 TABLET ORAL at 09:42

## 2024-08-10 RX ADMIN — Medication 1 MILLIGRAM(S): at 09:42

## 2024-08-10 RX ADMIN — Medication 400 UNIT(S): at 09:42

## 2024-08-11 RX ADMIN — Medication 1 MILLIGRAM(S): at 20:42

## 2024-08-11 RX ADMIN — Medication 1 TABLET(S): at 20:43

## 2024-08-11 RX ADMIN — METFORMIN HYDROCHLORIDE 1000 MILLIGRAM(S): 850 TABLET ORAL at 20:43

## 2024-08-11 RX ADMIN — Medication 750 MILLIGRAM(S): at 09:21

## 2024-08-11 RX ADMIN — Medication 400 UNIT(S): at 09:20

## 2024-08-11 RX ADMIN — Medication 1 MILLIGRAM(S): at 09:21

## 2024-08-11 RX ADMIN — Medication 15 MILLIGRAM(S): at 09:22

## 2024-08-11 RX ADMIN — Medication 750 MILLIGRAM(S): at 20:42

## 2024-08-11 RX ADMIN — QUETIAPINE FUMARATE 100 MILLIGRAM(S): 25 TABLET ORAL at 20:43

## 2024-08-11 RX ADMIN — METFORMIN HYDROCHLORIDE 1000 MILLIGRAM(S): 850 TABLET ORAL at 09:21

## 2024-08-11 RX ADMIN — METOPROLOL SUCCINATE 50 MILLIGRAM(S): 50 TABLET, EXTENDED RELEASE ORAL at 09:21

## 2024-08-12 RX ORDER — QUETIAPINE FUMARATE 25 MG/1
25 TABLET ORAL DAILY
Refills: 0 | Status: DISCONTINUED | OUTPATIENT
Start: 2024-08-12 | End: 2024-08-13

## 2024-08-12 RX ADMIN — METFORMIN HYDROCHLORIDE 1000 MILLIGRAM(S): 850 TABLET ORAL at 09:10

## 2024-08-12 RX ADMIN — Medication 75 MICROGRAM(S): at 06:11

## 2024-08-12 RX ADMIN — Medication 1 MILLIGRAM(S): at 20:06

## 2024-08-12 RX ADMIN — QUETIAPINE FUMARATE 100 MILLIGRAM(S): 25 TABLET ORAL at 20:06

## 2024-08-12 RX ADMIN — Medication 1 MILLIGRAM(S): at 09:10

## 2024-08-12 RX ADMIN — Medication 15 MILLIGRAM(S): at 14:39

## 2024-08-12 RX ADMIN — METOPROLOL SUCCINATE 50 MILLIGRAM(S): 50 TABLET, EXTENDED RELEASE ORAL at 09:10

## 2024-08-12 RX ADMIN — METFORMIN HYDROCHLORIDE 1000 MILLIGRAM(S): 850 TABLET ORAL at 20:06

## 2024-08-12 RX ADMIN — Medication 750 MILLIGRAM(S): at 20:06

## 2024-08-12 RX ADMIN — Medication 400 UNIT(S): at 09:10

## 2024-08-12 RX ADMIN — Medication 1 TABLET(S): at 20:06

## 2024-08-12 RX ADMIN — Medication 750 MILLIGRAM(S): at 09:10

## 2024-08-13 PROCEDURE — 99232 SBSQ HOSP IP/OBS MODERATE 35: CPT

## 2024-08-13 RX ORDER — QUETIAPINE FUMARATE 25 MG/1
125 TABLET ORAL AT BEDTIME
Refills: 0 | Status: DISCONTINUED | OUTPATIENT
Start: 2024-08-13 | End: 2024-08-17

## 2024-08-13 RX ADMIN — METOPROLOL SUCCINATE 50 MILLIGRAM(S): 50 TABLET, EXTENDED RELEASE ORAL at 09:14

## 2024-08-13 RX ADMIN — Medication 15 MILLIGRAM(S): at 09:14

## 2024-08-13 RX ADMIN — Medication 750 MILLIGRAM(S): at 09:14

## 2024-08-13 RX ADMIN — QUETIAPINE FUMARATE 25 MILLIGRAM(S): 25 TABLET ORAL at 09:14

## 2024-08-13 RX ADMIN — Medication 1 MILLIGRAM(S): at 09:14

## 2024-08-13 RX ADMIN — QUETIAPINE FUMARATE 125 MILLIGRAM(S): 25 TABLET ORAL at 20:08

## 2024-08-13 RX ADMIN — METFORMIN HYDROCHLORIDE 1000 MILLIGRAM(S): 850 TABLET ORAL at 09:14

## 2024-08-13 RX ADMIN — Medication 75 MICROGRAM(S): at 06:20

## 2024-08-13 RX ADMIN — METFORMIN HYDROCHLORIDE 1000 MILLIGRAM(S): 850 TABLET ORAL at 20:08

## 2024-08-13 RX ADMIN — Medication 1 MILLIGRAM(S): at 20:08

## 2024-08-13 RX ADMIN — Medication 750 MILLIGRAM(S): at 20:08

## 2024-08-13 RX ADMIN — Medication 1 TABLET(S): at 20:08

## 2024-08-13 RX ADMIN — Medication 400 UNIT(S): at 09:14

## 2024-08-14 PROCEDURE — 99232 SBSQ HOSP IP/OBS MODERATE 35: CPT

## 2024-08-14 RX ADMIN — Medication 75 MICROGRAM(S): at 06:16

## 2024-08-14 RX ADMIN — METFORMIN HYDROCHLORIDE 1000 MILLIGRAM(S): 850 TABLET ORAL at 10:11

## 2024-08-14 RX ADMIN — Medication 1 MILLIGRAM(S): at 10:11

## 2024-08-14 RX ADMIN — Medication 400 UNIT(S): at 10:10

## 2024-08-14 RX ADMIN — Medication 15 MILLIGRAM(S): at 10:11

## 2024-08-14 RX ADMIN — Medication 1 TABLET(S): at 20:03

## 2024-08-14 RX ADMIN — Medication 750 MILLIGRAM(S): at 10:10

## 2024-08-14 RX ADMIN — METFORMIN HYDROCHLORIDE 1000 MILLIGRAM(S): 850 TABLET ORAL at 20:03

## 2024-08-14 RX ADMIN — Medication 1 MILLIGRAM(S): at 20:03

## 2024-08-14 RX ADMIN — QUETIAPINE FUMARATE 125 MILLIGRAM(S): 25 TABLET ORAL at 20:03

## 2024-08-14 RX ADMIN — Medication 750 MILLIGRAM(S): at 20:03

## 2024-08-15 PROCEDURE — 99232 SBSQ HOSP IP/OBS MODERATE 35: CPT

## 2024-08-15 RX ORDER — MAGNESIUM HYDROXIDE 400 MG/5ML
30 SUSPENSION ORAL ONCE
Refills: 0 | Status: COMPLETED | OUTPATIENT
Start: 2024-08-15 | End: 2024-08-15

## 2024-08-15 RX ADMIN — METFORMIN HYDROCHLORIDE 1000 MILLIGRAM(S): 850 TABLET ORAL at 21:04

## 2024-08-15 RX ADMIN — QUETIAPINE FUMARATE 125 MILLIGRAM(S): 25 TABLET ORAL at 21:04

## 2024-08-15 RX ADMIN — METFORMIN HYDROCHLORIDE 1000 MILLIGRAM(S): 850 TABLET ORAL at 09:41

## 2024-08-15 RX ADMIN — Medication 1 MILLIGRAM(S): at 09:41

## 2024-08-15 RX ADMIN — Medication 750 MILLIGRAM(S): at 21:03

## 2024-08-15 RX ADMIN — Medication 7.5 MILLIGRAM(S): at 10:52

## 2024-08-15 RX ADMIN — MAGNESIUM HYDROXIDE 30 MILLILITER(S): 400 SUSPENSION ORAL at 23:47

## 2024-08-15 RX ADMIN — Medication 1 MILLIGRAM(S): at 21:03

## 2024-08-15 RX ADMIN — Medication 400 UNIT(S): at 09:41

## 2024-08-15 RX ADMIN — Medication 10 MILLIGRAM(S): at 18:45

## 2024-08-15 RX ADMIN — Medication 75 MICROGRAM(S): at 06:02

## 2024-08-15 RX ADMIN — Medication 750 MILLIGRAM(S): at 09:41

## 2024-08-15 RX ADMIN — Medication 1 TABLET(S): at 21:03

## 2024-08-16 PROCEDURE — 99232 SBSQ HOSP IP/OBS MODERATE 35: CPT

## 2024-08-16 RX ORDER — FLUPHENAZINE HCL 10 MG
18.75 TABLET ORAL ONCE
Refills: 0 | Status: DISCONTINUED | OUTPATIENT
Start: 2024-08-16 | End: 2024-08-16

## 2024-08-16 RX ORDER — SALINE 7; 19 G/118ML; G/118ML
1 ENEMA RECTAL ONCE
Refills: 0 | Status: COMPLETED | OUTPATIENT
Start: 2024-08-16 | End: 2024-08-16

## 2024-08-16 RX ORDER — LACTULOSE 10 G/15ML
10 SOLUTION ORAL ONCE
Refills: 0 | Status: COMPLETED | OUTPATIENT
Start: 2024-08-16 | End: 2024-08-16

## 2024-08-16 RX ORDER — SENNA 187 MG
2 TABLET ORAL AT BEDTIME
Refills: 0 | Status: DISCONTINUED | OUTPATIENT
Start: 2024-08-16 | End: 2024-09-28

## 2024-08-16 RX ORDER — FLUPHENAZINE HCL 10 MG
12.5 TABLET ORAL ONCE
Refills: 0 | Status: COMPLETED | OUTPATIENT
Start: 2024-08-16 | End: 2024-08-16

## 2024-08-16 RX ORDER — LACTULOSE 10 G/15ML
10 SOLUTION ORAL DAILY
Refills: 0 | Status: DISCONTINUED | OUTPATIENT
Start: 2024-08-16 | End: 2024-08-17

## 2024-08-16 RX ADMIN — SALINE 1 ENEMA: 7; 19 ENEMA RECTAL at 15:21

## 2024-08-16 RX ADMIN — Medication 12.5 MILLIGRAM(S): at 12:00

## 2024-08-16 RX ADMIN — QUETIAPINE FUMARATE 125 MILLIGRAM(S): 25 TABLET ORAL at 21:52

## 2024-08-16 RX ADMIN — Medication 75 MICROGRAM(S): at 06:44

## 2024-08-16 RX ADMIN — Medication 2 TABLET(S): at 16:13

## 2024-08-16 RX ADMIN — Medication 1 MILLIGRAM(S): at 21:52

## 2024-08-16 RX ADMIN — LACTULOSE 10 GRAM(S): 10 SOLUTION ORAL at 16:04

## 2024-08-16 RX ADMIN — Medication 750 MILLIGRAM(S): at 21:52

## 2024-08-16 RX ADMIN — METFORMIN HYDROCHLORIDE 1000 MILLIGRAM(S): 850 TABLET ORAL at 21:52

## 2024-08-16 RX ADMIN — Medication 1 TABLET(S): at 21:51

## 2024-08-16 RX ADMIN — Medication 2 PUFF(S): at 00:07

## 2024-08-17 PROCEDURE — 99232 SBSQ HOSP IP/OBS MODERATE 35: CPT

## 2024-08-17 RX ORDER — LACTULOSE 10 G/15ML
10 SOLUTION ORAL ONCE
Refills: 0 | Status: COMPLETED | OUTPATIENT
Start: 2024-08-17 | End: 2024-08-17

## 2024-08-17 RX ORDER — SALINE 7; 19 G/118ML; G/118ML
1 ENEMA RECTAL ONCE
Refills: 0 | Status: COMPLETED | OUTPATIENT
Start: 2024-08-17 | End: 2024-08-17

## 2024-08-17 RX ORDER — LACTULOSE 10 G/15ML
10 SOLUTION ORAL DAILY
Refills: 0 | Status: DISCONTINUED | OUTPATIENT
Start: 2024-08-18 | End: 2024-09-05

## 2024-08-17 RX ORDER — MAGNESIUM CITRATE
296 SOLUTION, ORAL ORAL ONCE
Refills: 0 | Status: COMPLETED | OUTPATIENT
Start: 2024-08-17 | End: 2024-08-18

## 2024-08-17 RX ORDER — QUETIAPINE FUMARATE 25 MG/1
50 TABLET ORAL AT BEDTIME
Refills: 0 | Status: DISCONTINUED | OUTPATIENT
Start: 2024-08-17 | End: 2024-08-17

## 2024-08-17 RX ADMIN — Medication 1 TABLET(S): at 20:45

## 2024-08-17 RX ADMIN — SALINE 1 ENEMA: 7; 19 ENEMA RECTAL at 11:19

## 2024-08-17 RX ADMIN — METFORMIN HYDROCHLORIDE 1000 MILLIGRAM(S): 850 TABLET ORAL at 20:44

## 2024-08-17 RX ADMIN — Medication 750 MILLIGRAM(S): at 10:21

## 2024-08-17 RX ADMIN — Medication 75 MICROGRAM(S): at 06:27

## 2024-08-17 RX ADMIN — LACTULOSE 10 GRAM(S): 10 SOLUTION ORAL at 11:20

## 2024-08-17 RX ADMIN — Medication 2 TABLET(S): at 20:44

## 2024-08-17 RX ADMIN — Medication 400 UNIT(S): at 10:21

## 2024-08-17 RX ADMIN — METFORMIN HYDROCHLORIDE 1000 MILLIGRAM(S): 850 TABLET ORAL at 10:21

## 2024-08-17 RX ADMIN — METOPROLOL SUCCINATE 50 MILLIGRAM(S): 50 TABLET, EXTENDED RELEASE ORAL at 10:21

## 2024-08-17 RX ADMIN — Medication 750 MILLIGRAM(S): at 20:44

## 2024-08-18 PROCEDURE — 99232 SBSQ HOSP IP/OBS MODERATE 35: CPT

## 2024-08-18 RX ADMIN — Medication 750 MILLIGRAM(S): at 20:18

## 2024-08-18 RX ADMIN — Medication 750 MILLIGRAM(S): at 09:47

## 2024-08-18 RX ADMIN — Medication 1 TABLET(S): at 20:18

## 2024-08-18 RX ADMIN — Medication 296 MILLILITER(S): at 09:47

## 2024-08-18 RX ADMIN — METFORMIN HYDROCHLORIDE 1000 MILLIGRAM(S): 850 TABLET ORAL at 20:17

## 2024-08-18 RX ADMIN — LACTULOSE 10 GRAM(S): 10 SOLUTION ORAL at 09:47

## 2024-08-18 RX ADMIN — Medication 75 MICROGRAM(S): at 06:40

## 2024-08-19 PROCEDURE — 99232 SBSQ HOSP IP/OBS MODERATE 35: CPT

## 2024-08-19 RX ADMIN — METFORMIN HYDROCHLORIDE 1000 MILLIGRAM(S): 850 TABLET ORAL at 10:24

## 2024-08-19 RX ADMIN — Medication 750 MILLIGRAM(S): at 10:25

## 2024-08-19 RX ADMIN — Medication 75 MICROGRAM(S): at 05:56

## 2024-08-19 RX ADMIN — METFORMIN HYDROCHLORIDE 1000 MILLIGRAM(S): 850 TABLET ORAL at 20:41

## 2024-08-19 RX ADMIN — Medication 2 TABLET(S): at 20:49

## 2024-08-19 RX ADMIN — Medication 1 TABLET(S): at 20:41

## 2024-08-19 RX ADMIN — Medication 750 MILLIGRAM(S): at 20:42

## 2024-08-19 RX ADMIN — Medication 400 UNIT(S): at 10:26

## 2024-08-20 PROCEDURE — 99232 SBSQ HOSP IP/OBS MODERATE 35: CPT

## 2024-08-20 RX ADMIN — METFORMIN HYDROCHLORIDE 1000 MILLIGRAM(S): 850 TABLET ORAL at 21:06

## 2024-08-20 RX ADMIN — Medication 750 MILLIGRAM(S): at 09:50

## 2024-08-20 RX ADMIN — Medication 75 MICROGRAM(S): at 06:41

## 2024-08-20 RX ADMIN — Medication 1 TABLET(S): at 21:06

## 2024-08-20 RX ADMIN — Medication 750 MILLIGRAM(S): at 21:06

## 2024-08-20 RX ADMIN — Medication 2 TABLET(S): at 21:06

## 2024-08-20 RX ADMIN — LACTULOSE 10 GRAM(S): 10 SOLUTION ORAL at 09:53

## 2024-08-21 PROCEDURE — 99232 SBSQ HOSP IP/OBS MODERATE 35: CPT

## 2024-08-21 RX ADMIN — Medication 750 MILLIGRAM(S): at 21:24

## 2024-08-21 RX ADMIN — Medication 2 TABLET(S): at 21:24

## 2024-08-21 RX ADMIN — Medication 75 MICROGRAM(S): at 06:40

## 2024-08-21 RX ADMIN — METFORMIN HYDROCHLORIDE 1000 MILLIGRAM(S): 850 TABLET ORAL at 21:24

## 2024-08-21 RX ADMIN — Medication 1 TABLET(S): at 21:24

## 2024-08-21 RX ADMIN — Medication 750 MILLIGRAM(S): at 09:23

## 2024-08-21 RX ADMIN — LACTULOSE 10 GRAM(S): 10 SOLUTION ORAL at 09:23

## 2024-08-22 PROCEDURE — 99232 SBSQ HOSP IP/OBS MODERATE 35: CPT

## 2024-08-22 RX ADMIN — Medication 75 MICROGRAM(S): at 05:52

## 2024-08-22 RX ADMIN — Medication 1 TABLET(S): at 20:26

## 2024-08-22 RX ADMIN — METFORMIN HYDROCHLORIDE 1000 MILLIGRAM(S): 850 TABLET ORAL at 20:26

## 2024-08-22 RX ADMIN — Medication 750 MILLIGRAM(S): at 20:27

## 2024-08-22 RX ADMIN — LACTULOSE 10 GRAM(S): 10 SOLUTION ORAL at 10:06

## 2024-08-22 RX ADMIN — Medication 750 MILLIGRAM(S): at 10:08

## 2024-08-23 PROCEDURE — 99232 SBSQ HOSP IP/OBS MODERATE 35: CPT

## 2024-08-23 RX ORDER — BENZTROPINE MESYLATE 2 MG
0.5 TABLET ORAL
Refills: 0 | Status: DISCONTINUED | OUTPATIENT
Start: 2024-08-24 | End: 2024-09-03

## 2024-08-23 RX ADMIN — Medication 750 MILLIGRAM(S): at 20:25

## 2024-08-23 RX ADMIN — METFORMIN HYDROCHLORIDE 1000 MILLIGRAM(S): 850 TABLET ORAL at 20:29

## 2024-08-23 RX ADMIN — METOPROLOL SUCCINATE 50 MILLIGRAM(S): 50 TABLET, EXTENDED RELEASE ORAL at 09:25

## 2024-08-23 RX ADMIN — Medication 400 UNIT(S): at 09:25

## 2024-08-23 RX ADMIN — Medication 75 MICROGRAM(S): at 06:26

## 2024-08-23 RX ADMIN — Medication 750 MILLIGRAM(S): at 09:26

## 2024-08-23 RX ADMIN — Medication 1 TABLET(S): at 20:29

## 2024-08-23 RX ADMIN — LACTULOSE 10 GRAM(S): 10 SOLUTION ORAL at 09:25

## 2024-08-23 RX ADMIN — METFORMIN HYDROCHLORIDE 1000 MILLIGRAM(S): 850 TABLET ORAL at 09:25

## 2024-08-24 RX ADMIN — Medication 750 MILLIGRAM(S): at 11:09

## 2024-08-24 RX ADMIN — Medication 75 MICROGRAM(S): at 05:26

## 2024-08-24 RX ADMIN — Medication 1 TABLET(S): at 21:32

## 2024-08-24 RX ADMIN — METFORMIN HYDROCHLORIDE 1000 MILLIGRAM(S): 850 TABLET ORAL at 11:09

## 2024-08-24 RX ADMIN — Medication 400 UNIT(S): at 11:10

## 2024-08-24 RX ADMIN — Medication 2 TABLET(S): at 21:32

## 2024-08-24 RX ADMIN — Medication 750 MILLIGRAM(S): at 21:32

## 2024-08-24 RX ADMIN — Medication 0.5 MILLIGRAM(S): at 11:09

## 2024-08-24 RX ADMIN — Medication 0.5 MILLIGRAM(S): at 21:32

## 2024-08-24 RX ADMIN — METFORMIN HYDROCHLORIDE 1000 MILLIGRAM(S): 850 TABLET ORAL at 21:32

## 2024-08-25 RX ADMIN — METFORMIN HYDROCHLORIDE 1000 MILLIGRAM(S): 850 TABLET ORAL at 10:10

## 2024-08-25 RX ADMIN — Medication 0.5 MILLIGRAM(S): at 21:28

## 2024-08-25 RX ADMIN — LACTULOSE 10 GRAM(S): 10 SOLUTION ORAL at 10:10

## 2024-08-25 RX ADMIN — Medication 0.5 MILLIGRAM(S): at 10:10

## 2024-08-25 RX ADMIN — METFORMIN HYDROCHLORIDE 1000 MILLIGRAM(S): 850 TABLET ORAL at 21:28

## 2024-08-25 RX ADMIN — Medication 750 MILLIGRAM(S): at 10:10

## 2024-08-25 RX ADMIN — Medication 400 UNIT(S): at 10:10

## 2024-08-25 RX ADMIN — Medication 750 MILLIGRAM(S): at 21:28

## 2024-08-26 PROCEDURE — 99232 SBSQ HOSP IP/OBS MODERATE 35: CPT

## 2024-08-26 RX ADMIN — Medication 400 UNIT(S): at 09:27

## 2024-08-26 RX ADMIN — Medication 750 MILLIGRAM(S): at 22:17

## 2024-08-26 RX ADMIN — Medication 0.5 MILLIGRAM(S): at 09:28

## 2024-08-26 RX ADMIN — Medication 1 TABLET(S): at 22:16

## 2024-08-26 RX ADMIN — Medication 0.5 MILLIGRAM(S): at 22:16

## 2024-08-26 RX ADMIN — METFORMIN HYDROCHLORIDE 1000 MILLIGRAM(S): 850 TABLET ORAL at 22:16

## 2024-08-26 RX ADMIN — Medication 750 MILLIGRAM(S): at 09:27

## 2024-08-26 RX ADMIN — METFORMIN HYDROCHLORIDE 1000 MILLIGRAM(S): 850 TABLET ORAL at 09:28

## 2024-08-26 RX ADMIN — LACTULOSE 10 GRAM(S): 10 SOLUTION ORAL at 09:28

## 2024-08-26 RX ADMIN — Medication 2 TABLET(S): at 22:16

## 2024-08-27 LAB
A1C WITH ESTIMATED AVERAGE GLUCOSE RESULT: 7.9 % — HIGH (ref 4–5.6)
ALBUMIN SERPL ELPH-MCNC: 3.5 G/DL — SIGNIFICANT CHANGE UP (ref 3.3–5)
ALP SERPL-CCNC: 78 U/L — SIGNIFICANT CHANGE UP (ref 40–120)
ALT FLD-CCNC: 22 U/L — SIGNIFICANT CHANGE UP (ref 4–33)
ANION GAP SERPL CALC-SCNC: 9 MMOL/L — SIGNIFICANT CHANGE UP (ref 7–14)
AST SERPL-CCNC: 34 U/L — HIGH (ref 4–32)
BASOPHILS # BLD AUTO: 0.05 K/UL — SIGNIFICANT CHANGE UP (ref 0–0.2)
BASOPHILS NFR BLD AUTO: 0.9 % — SIGNIFICANT CHANGE UP (ref 0–2)
BILIRUB SERPL-MCNC: <0.2 MG/DL — SIGNIFICANT CHANGE UP (ref 0.2–1.2)
BUN SERPL-MCNC: 17 MG/DL — SIGNIFICANT CHANGE UP (ref 7–23)
CALCIUM SERPL-MCNC: 9.2 MG/DL — SIGNIFICANT CHANGE UP (ref 8.4–10.5)
CHLORIDE SERPL-SCNC: 106 MMOL/L — SIGNIFICANT CHANGE UP (ref 98–107)
CO2 SERPL-SCNC: 23 MMOL/L — SIGNIFICANT CHANGE UP (ref 22–31)
CREAT SERPL-MCNC: 0.76 MG/DL — SIGNIFICANT CHANGE UP (ref 0.5–1.3)
EGFR: 84 ML/MIN/1.73M2 — SIGNIFICANT CHANGE UP
EGFR: 84 ML/MIN/1.73M2 — SIGNIFICANT CHANGE UP
EOSINOPHIL # BLD AUTO: 0.29 K/UL — SIGNIFICANT CHANGE UP (ref 0–0.5)
EOSINOPHIL NFR BLD AUTO: 5 % — SIGNIFICANT CHANGE UP (ref 0–6)
ESTIMATED AVERAGE GLUCOSE: 180 — SIGNIFICANT CHANGE UP
GLUCOSE SERPL-MCNC: 162 MG/DL — HIGH (ref 70–99)
HCT VFR BLD CALC: 37.8 % — SIGNIFICANT CHANGE UP (ref 34.5–45)
HGB BLD-MCNC: 11.8 G/DL — SIGNIFICANT CHANGE UP (ref 11.5–15.5)
IANC: 3.43 K/UL — SIGNIFICANT CHANGE UP (ref 1.8–7.4)
IMM GRANULOCYTES NFR BLD AUTO: 0.7 % — SIGNIFICANT CHANGE UP (ref 0–0.9)
LYMPHOCYTES # BLD AUTO: 1.39 K/UL — SIGNIFICANT CHANGE UP (ref 1–3.3)
LYMPHOCYTES # BLD AUTO: 24 % — SIGNIFICANT CHANGE UP (ref 13–44)
MCHC RBC-ENTMCNC: 26.4 PG — LOW (ref 27–34)
MCHC RBC-ENTMCNC: 31.2 GM/DL — LOW (ref 32–36)
MCV RBC AUTO: 84.6 FL — SIGNIFICANT CHANGE UP (ref 80–100)
MONOCYTES # BLD AUTO: 0.58 K/UL — SIGNIFICANT CHANGE UP (ref 0–0.9)
MONOCYTES NFR BLD AUTO: 10 % — SIGNIFICANT CHANGE UP (ref 2–14)
NEUTROPHILS # BLD AUTO: 3.43 K/UL — SIGNIFICANT CHANGE UP (ref 1.8–7.4)
NEUTROPHILS NFR BLD AUTO: 59.4 % — SIGNIFICANT CHANGE UP (ref 43–77)
NRBC # BLD AUTO: 0 K/UL — SIGNIFICANT CHANGE UP (ref 0–0)
NRBC # BLD: 0 /100 WBCS — SIGNIFICANT CHANGE UP (ref 0–0)
NRBC # FLD: 0 K/UL — SIGNIFICANT CHANGE UP (ref 0–0)
NRBC BLD-RTO: 0 /100 WBCS — SIGNIFICANT CHANGE UP (ref 0–0)
PLATELET # BLD AUTO: SIGNIFICANT CHANGE UP K/UL (ref 150–400)
POTASSIUM SERPL-MCNC: 5.2 MMOL/L — SIGNIFICANT CHANGE UP (ref 3.5–5.3)
POTASSIUM SERPL-SCNC: 5.2 MMOL/L — SIGNIFICANT CHANGE UP (ref 3.5–5.3)
PROT SERPL-MCNC: 7.1 G/DL — SIGNIFICANT CHANGE UP (ref 6–8.3)
RBC # BLD: 4.47 M/UL — SIGNIFICANT CHANGE UP (ref 3.8–5.2)
RBC # FLD: 14.7 % — HIGH (ref 10.3–14.5)
SODIUM SERPL-SCNC: 138 MMOL/L — SIGNIFICANT CHANGE UP (ref 135–145)
WBC # BLD: 5.78 K/UL — SIGNIFICANT CHANGE UP (ref 3.8–10.5)
WBC # FLD AUTO: 5.78 K/UL — SIGNIFICANT CHANGE UP (ref 3.8–10.5)

## 2024-08-27 PROCEDURE — 99232 SBSQ HOSP IP/OBS MODERATE 35: CPT

## 2024-08-27 RX ADMIN — Medication 750 MILLIGRAM(S): at 21:29

## 2024-08-27 RX ADMIN — Medication 0.5 MILLIGRAM(S): at 21:29

## 2024-08-27 RX ADMIN — METOPROLOL SUCCINATE 50 MILLIGRAM(S): 50 TABLET, EXTENDED RELEASE ORAL at 08:45

## 2024-08-27 RX ADMIN — METFORMIN HYDROCHLORIDE 1000 MILLIGRAM(S): 850 TABLET ORAL at 21:28

## 2024-08-27 RX ADMIN — METFORMIN HYDROCHLORIDE 1000 MILLIGRAM(S): 850 TABLET ORAL at 08:48

## 2024-08-27 RX ADMIN — Medication 750 MILLIGRAM(S): at 08:46

## 2024-08-27 RX ADMIN — Medication 0.5 MILLIGRAM(S): at 08:45

## 2024-08-27 RX ADMIN — Medication 1 TABLET(S): at 21:29

## 2024-08-27 RX ADMIN — Medication 400 UNIT(S): at 08:45

## 2024-08-27 RX ADMIN — Medication 75 MICROGRAM(S): at 06:29

## 2024-08-28 RX ADMIN — LACTULOSE 10 GRAM(S): 10 SOLUTION ORAL at 09:57

## 2024-08-28 RX ADMIN — Medication 75 MICROGRAM(S): at 06:54

## 2024-08-28 RX ADMIN — Medication 750 MILLIGRAM(S): at 09:57

## 2024-08-28 RX ADMIN — Medication 0.5 MILLIGRAM(S): at 20:42

## 2024-08-28 RX ADMIN — Medication 0.5 MILLIGRAM(S): at 09:57

## 2024-08-28 RX ADMIN — METFORMIN HYDROCHLORIDE 1000 MILLIGRAM(S): 850 TABLET ORAL at 09:58

## 2024-08-28 RX ADMIN — Medication 400 UNIT(S): at 09:58

## 2024-08-28 RX ADMIN — Medication 2 TABLET(S): at 20:41

## 2024-08-28 RX ADMIN — METFORMIN HYDROCHLORIDE 1000 MILLIGRAM(S): 850 TABLET ORAL at 20:41

## 2024-08-28 RX ADMIN — METOPROLOL SUCCINATE 50 MILLIGRAM(S): 50 TABLET, EXTENDED RELEASE ORAL at 09:58

## 2024-08-28 RX ADMIN — Medication 750 MILLIGRAM(S): at 20:41

## 2024-08-28 RX ADMIN — Medication 1 TABLET(S): at 20:41

## 2024-08-29 RX ORDER — BISACODYL 5 MG
5 TABLET, DELAYED RELEASE (ENTERIC COATED) ORAL EVERY 12 HOURS
Refills: 0 | Status: DISCONTINUED | OUTPATIENT
Start: 2024-08-29 | End: 2024-09-28

## 2024-08-29 RX ADMIN — METFORMIN HYDROCHLORIDE 1000 MILLIGRAM(S): 850 TABLET ORAL at 21:16

## 2024-08-29 RX ADMIN — Medication 750 MILLIGRAM(S): at 21:16

## 2024-08-29 RX ADMIN — Medication 1 TABLET(S): at 21:16

## 2024-08-29 RX ADMIN — Medication 0.5 MILLIGRAM(S): at 21:16

## 2024-08-29 RX ADMIN — Medication 400 UNIT(S): at 09:05

## 2024-08-29 RX ADMIN — METFORMIN HYDROCHLORIDE 1000 MILLIGRAM(S): 850 TABLET ORAL at 09:05

## 2024-08-29 RX ADMIN — METOPROLOL SUCCINATE 50 MILLIGRAM(S): 50 TABLET, EXTENDED RELEASE ORAL at 09:06

## 2024-08-29 RX ADMIN — Medication 0.5 MILLIGRAM(S): at 09:06

## 2024-08-29 RX ADMIN — Medication 750 MILLIGRAM(S): at 09:05

## 2024-08-29 RX ADMIN — Medication 5 MILLIGRAM(S): at 21:16

## 2024-08-29 RX ADMIN — Medication 75 MICROGRAM(S): at 06:20

## 2024-08-30 LAB
APPEARANCE UR: CLEAR — SIGNIFICANT CHANGE UP
BILIRUB UR-MCNC: NEGATIVE — SIGNIFICANT CHANGE UP
COLOR SPEC: YELLOW — SIGNIFICANT CHANGE UP
DIFF PNL FLD: NEGATIVE — SIGNIFICANT CHANGE UP
GLUCOSE UR QL: 100 MG/DL
KETONES UR-MCNC: NEGATIVE MG/DL — SIGNIFICANT CHANGE UP
LEUKOCYTE ESTERASE UR-ACNC: NEGATIVE — SIGNIFICANT CHANGE UP
NITRITE UR-MCNC: NEGATIVE — SIGNIFICANT CHANGE UP
PH UR: 7.5 — SIGNIFICANT CHANGE UP (ref 5–8)
PROT UR-MCNC: NEGATIVE MG/DL — SIGNIFICANT CHANGE UP
SP GR SPEC: 1.01 — SIGNIFICANT CHANGE UP (ref 1–1.03)
UROBILINOGEN FLD QL: 0.2 MG/DL — SIGNIFICANT CHANGE UP (ref 0.2–1)

## 2024-08-30 PROCEDURE — 99232 SBSQ HOSP IP/OBS MODERATE 35: CPT

## 2024-08-30 RX ORDER — BACITRACIN 500 UNIT/G
1 OINTMENT (GRAM) TOPICAL
Refills: 0 | Status: DISCONTINUED | OUTPATIENT
Start: 2024-08-30 | End: 2024-09-28

## 2024-08-30 RX ADMIN — METOPROLOL SUCCINATE 50 MILLIGRAM(S): 50 TABLET, EXTENDED RELEASE ORAL at 10:06

## 2024-08-30 RX ADMIN — Medication 2 TABLET(S): at 21:36

## 2024-08-30 RX ADMIN — Medication 750 MILLIGRAM(S): at 21:37

## 2024-08-30 RX ADMIN — METFORMIN HYDROCHLORIDE 1000 MILLIGRAM(S): 850 TABLET ORAL at 10:06

## 2024-08-30 RX ADMIN — Medication 400 UNIT(S): at 10:05

## 2024-08-30 RX ADMIN — Medication 0.5 MILLIGRAM(S): at 21:37

## 2024-08-30 RX ADMIN — Medication 75 MICROGRAM(S): at 05:26

## 2024-08-30 RX ADMIN — Medication 1 TABLET(S): at 21:37

## 2024-08-30 RX ADMIN — LACTULOSE 10 GRAM(S): 10 SOLUTION ORAL at 10:05

## 2024-08-30 RX ADMIN — METFORMIN HYDROCHLORIDE 1000 MILLIGRAM(S): 850 TABLET ORAL at 21:36

## 2024-08-30 RX ADMIN — Medication 750 MILLIGRAM(S): at 10:06

## 2024-08-30 RX ADMIN — Medication 0.5 MILLIGRAM(S): at 10:06

## 2024-08-31 PROCEDURE — 99231 SBSQ HOSP IP/OBS SF/LOW 25: CPT

## 2024-08-31 RX ADMIN — METFORMIN HYDROCHLORIDE 1000 MILLIGRAM(S): 850 TABLET ORAL at 09:42

## 2024-08-31 RX ADMIN — Medication 750 MILLIGRAM(S): at 20:41

## 2024-08-31 RX ADMIN — Medication 400 UNIT(S): at 09:42

## 2024-08-31 RX ADMIN — Medication 1 TABLET(S): at 20:41

## 2024-08-31 RX ADMIN — Medication 0.5 MILLIGRAM(S): at 20:41

## 2024-08-31 RX ADMIN — METFORMIN HYDROCHLORIDE 1000 MILLIGRAM(S): 850 TABLET ORAL at 20:41

## 2024-08-31 RX ADMIN — Medication 0.5 MILLIGRAM(S): at 09:42

## 2024-08-31 RX ADMIN — Medication 2 TABLET(S): at 20:41

## 2024-08-31 RX ADMIN — Medication 750 MILLIGRAM(S): at 09:42

## 2024-08-31 RX ADMIN — METOPROLOL SUCCINATE 50 MILLIGRAM(S): 50 TABLET, EXTENDED RELEASE ORAL at 09:42

## 2024-09-01 RX ADMIN — METFORMIN HYDROCHLORIDE 1000 MILLIGRAM(S): 850 TABLET ORAL at 09:30

## 2024-09-01 RX ADMIN — Medication 1 TABLET(S): at 21:44

## 2024-09-01 RX ADMIN — Medication 750 MILLIGRAM(S): at 21:43

## 2024-09-01 RX ADMIN — Medication 2 TABLET(S): at 21:44

## 2024-09-01 RX ADMIN — Medication 750 MILLIGRAM(S): at 09:30

## 2024-09-01 RX ADMIN — METFORMIN HYDROCHLORIDE 1000 MILLIGRAM(S): 850 TABLET ORAL at 21:44

## 2024-09-01 RX ADMIN — Medication 0.5 MILLIGRAM(S): at 21:43

## 2024-09-01 RX ADMIN — Medication 400 UNIT(S): at 09:30

## 2024-09-01 RX ADMIN — Medication 75 MICROGRAM(S): at 05:42

## 2024-09-01 RX ADMIN — Medication 0.5 MILLIGRAM(S): at 09:30

## 2024-09-01 RX ADMIN — METOPROLOL SUCCINATE 50 MILLIGRAM(S): 50 TABLET, EXTENDED RELEASE ORAL at 09:30

## 2024-09-02 RX ADMIN — Medication 0.5 MILLIGRAM(S): at 21:16

## 2024-09-02 RX ADMIN — Medication 2 TABLET(S): at 21:15

## 2024-09-02 RX ADMIN — Medication 400 UNIT(S): at 08:30

## 2024-09-02 RX ADMIN — METFORMIN HYDROCHLORIDE 1000 MILLIGRAM(S): 850 TABLET ORAL at 08:31

## 2024-09-02 RX ADMIN — METFORMIN HYDROCHLORIDE 1000 MILLIGRAM(S): 850 TABLET ORAL at 21:15

## 2024-09-02 RX ADMIN — Medication 1 TABLET(S): at 21:15

## 2024-09-02 RX ADMIN — Medication 750 MILLIGRAM(S): at 21:15

## 2024-09-02 RX ADMIN — Medication 0.5 MILLIGRAM(S): at 08:31

## 2024-09-02 RX ADMIN — Medication 750 MILLIGRAM(S): at 08:30

## 2024-09-02 RX ADMIN — METOPROLOL SUCCINATE 50 MILLIGRAM(S): 50 TABLET, EXTENDED RELEASE ORAL at 08:31

## 2024-09-03 LAB
-  AMOXICILLIN/CLAVULANIC ACID: SIGNIFICANT CHANGE UP
-  AMPICILLIN/SULBACTAM: SIGNIFICANT CHANGE UP
-  AMPICILLIN: SIGNIFICANT CHANGE UP
-  AZTREONAM: SIGNIFICANT CHANGE UP
-  CEFAZOLIN: SIGNIFICANT CHANGE UP
-  CEFEPIME: SIGNIFICANT CHANGE UP
-  CEFOXITIN: SIGNIFICANT CHANGE UP
-  CEFTRIAXONE: SIGNIFICANT CHANGE UP
-  CEFUROXIME: SIGNIFICANT CHANGE UP
-  CIPROFLOXACIN: SIGNIFICANT CHANGE UP
-  ERTAPENEM: SIGNIFICANT CHANGE UP
-  GENTAMICIN: SIGNIFICANT CHANGE UP
-  LEVOFLOXACIN: SIGNIFICANT CHANGE UP
-  MEROPENEM: SIGNIFICANT CHANGE UP
-  NITROFURANTOIN: SIGNIFICANT CHANGE UP
-  PIPERACILLIN/TAZOBACTAM: SIGNIFICANT CHANGE UP
-  TOBRAMYCIN: SIGNIFICANT CHANGE UP
-  TRIMETHOPRIM/SULFAMETHOXAZOLE: SIGNIFICANT CHANGE UP
CULTURE RESULTS: ABNORMAL
METHOD TYPE: SIGNIFICANT CHANGE UP
ORGANISM # SPEC MICROSCOPIC CNT: ABNORMAL
ORGANISM # SPEC MICROSCOPIC CNT: ABNORMAL
SPECIMEN SOURCE: SIGNIFICANT CHANGE UP

## 2024-09-03 PROCEDURE — 99231 SBSQ HOSP IP/OBS SF/LOW 25: CPT

## 2024-09-03 RX ORDER — MAGNESIUM CITRATE
296 SOLUTION, ORAL ORAL ONCE
Refills: 0 | Status: COMPLETED | OUTPATIENT
Start: 2024-09-03 | End: 2024-09-03

## 2024-09-03 RX ORDER — BENZTROPINE MESYLATE 2 MG
0.5 TABLET ORAL
Refills: 0 | Status: DISCONTINUED | OUTPATIENT
Start: 2024-09-03 | End: 2024-09-03

## 2024-09-03 RX ORDER — BENZTROPINE MESYLATE 2 MG
0.5 TABLET ORAL DAILY
Refills: 0 | Status: DISCONTINUED | OUTPATIENT
Start: 2024-09-03 | End: 2024-09-05

## 2024-09-03 RX ADMIN — METFORMIN HYDROCHLORIDE 1000 MILLIGRAM(S): 850 TABLET ORAL at 09:46

## 2024-09-03 RX ADMIN — METFORMIN HYDROCHLORIDE 1000 MILLIGRAM(S): 850 TABLET ORAL at 21:56

## 2024-09-03 RX ADMIN — Medication 2 TABLET(S): at 22:12

## 2024-09-03 RX ADMIN — Medication 750 MILLIGRAM(S): at 09:46

## 2024-09-03 RX ADMIN — Medication 400 UNIT(S): at 09:46

## 2024-09-03 RX ADMIN — Medication 750 MILLIGRAM(S): at 21:56

## 2024-09-03 RX ADMIN — LACTULOSE 10 GRAM(S): 10 SOLUTION ORAL at 09:45

## 2024-09-03 RX ADMIN — Medication 1 TABLET(S): at 21:56

## 2024-09-03 RX ADMIN — Medication 296 MILLILITER(S): at 09:45

## 2024-09-03 RX ADMIN — Medication 75 MICROGRAM(S): at 07:13

## 2024-09-04 PROCEDURE — 99232 SBSQ HOSP IP/OBS MODERATE 35: CPT

## 2024-09-04 RX ADMIN — Medication 750 MILLIGRAM(S): at 12:00

## 2024-09-04 RX ADMIN — Medication 1 TABLET(S): at 21:45

## 2024-09-04 RX ADMIN — Medication 0.5 MILLIGRAM(S): at 12:00

## 2024-09-04 RX ADMIN — Medication 400 UNIT(S): at 12:00

## 2024-09-04 RX ADMIN — Medication 2 TABLET(S): at 21:45

## 2024-09-04 RX ADMIN — METFORMIN HYDROCHLORIDE 1000 MILLIGRAM(S): 850 TABLET ORAL at 21:45

## 2024-09-04 RX ADMIN — METFORMIN HYDROCHLORIDE 1000 MILLIGRAM(S): 850 TABLET ORAL at 12:00

## 2024-09-04 RX ADMIN — Medication 750 MILLIGRAM(S): at 09:15

## 2024-09-04 RX ADMIN — Medication 75 MICROGRAM(S): at 05:42

## 2024-09-05 PROCEDURE — 99232 SBSQ HOSP IP/OBS MODERATE 35: CPT

## 2024-09-05 RX ORDER — MAGNESIUM CITRATE
296 SOLUTION, ORAL ORAL ONCE
Refills: 0 | Status: COMPLETED | OUTPATIENT
Start: 2024-09-05 | End: 2024-09-05

## 2024-09-05 RX ADMIN — Medication 1 TABLET(S): at 21:06

## 2024-09-05 RX ADMIN — LACTULOSE 10 GRAM(S): 10 SOLUTION ORAL at 10:13

## 2024-09-05 RX ADMIN — Medication 750 MILLIGRAM(S): at 21:06

## 2024-09-05 RX ADMIN — METFORMIN HYDROCHLORIDE 1000 MILLIGRAM(S): 850 TABLET ORAL at 10:14

## 2024-09-05 RX ADMIN — Medication 400 UNIT(S): at 10:15

## 2024-09-05 RX ADMIN — Medication 750 MILLIGRAM(S): at 10:14

## 2024-09-05 RX ADMIN — Medication 75 MICROGRAM(S): at 05:36

## 2024-09-05 RX ADMIN — Medication 0.5 MILLIGRAM(S): at 10:14

## 2024-09-05 RX ADMIN — Medication 296 MILLILITER(S): at 17:51

## 2024-09-06 PROCEDURE — 99232 SBSQ HOSP IP/OBS MODERATE 35: CPT

## 2024-09-06 RX ORDER — PHENYLEPHRINE HYDROCHLORIDE AND FAT, HARD .00525; 1.86 G/1; G/1
1 SUPPOSITORY RECTAL DAILY
Refills: 0 | Status: DISCONTINUED | OUTPATIENT
Start: 2024-09-06 | End: 2024-09-28

## 2024-09-06 RX ADMIN — Medication 1 TABLET(S): at 20:02

## 2024-09-06 RX ADMIN — Medication 750 MILLIGRAM(S): at 10:45

## 2024-09-06 RX ADMIN — Medication 75 MICROGRAM(S): at 05:09

## 2024-09-06 RX ADMIN — Medication 750 MILLIGRAM(S): at 20:02

## 2024-09-06 RX ADMIN — METFORMIN HYDROCHLORIDE 1000 MILLIGRAM(S): 850 TABLET ORAL at 10:45

## 2024-09-06 RX ADMIN — METFORMIN HYDROCHLORIDE 1000 MILLIGRAM(S): 850 TABLET ORAL at 20:01

## 2024-09-06 RX ADMIN — Medication 400 UNIT(S): at 10:44

## 2024-09-07 RX ADMIN — METFORMIN HYDROCHLORIDE 1000 MILLIGRAM(S): 850 TABLET ORAL at 21:55

## 2024-09-07 RX ADMIN — Medication 75 MICROGRAM(S): at 05:32

## 2024-09-07 RX ADMIN — Medication 2 TABLET(S): at 21:55

## 2024-09-07 RX ADMIN — METFORMIN HYDROCHLORIDE 1000 MILLIGRAM(S): 850 TABLET ORAL at 09:45

## 2024-09-07 RX ADMIN — Medication 400 UNIT(S): at 09:45

## 2024-09-07 RX ADMIN — Medication 5 MILLIGRAM(S): at 15:00

## 2024-09-07 RX ADMIN — Medication 750 MILLIGRAM(S): at 09:45

## 2024-09-07 RX ADMIN — Medication 1 TABLET(S): at 21:55

## 2024-09-07 RX ADMIN — Medication 750 MILLIGRAM(S): at 21:55

## 2024-09-08 RX ADMIN — Medication 400 UNIT(S): at 09:42

## 2024-09-08 RX ADMIN — Medication 750 MILLIGRAM(S): at 09:42

## 2024-09-08 RX ADMIN — Medication 1 TABLET(S): at 21:51

## 2024-09-08 RX ADMIN — METFORMIN HYDROCHLORIDE 1000 MILLIGRAM(S): 850 TABLET ORAL at 09:42

## 2024-09-08 RX ADMIN — Medication 750 MILLIGRAM(S): at 21:56

## 2024-09-08 RX ADMIN — Medication 2 TABLET(S): at 21:52

## 2024-09-08 RX ADMIN — METFORMIN HYDROCHLORIDE 1000 MILLIGRAM(S): 850 TABLET ORAL at 21:54

## 2024-09-09 PROCEDURE — 99232 SBSQ HOSP IP/OBS MODERATE 35: CPT

## 2024-09-09 RX ORDER — FLUPHENAZINE HCL 10 MG
12.5 TABLET ORAL ONCE
Refills: 0 | Status: COMPLETED | OUTPATIENT
Start: 2024-09-09 | End: 2024-09-09

## 2024-09-09 RX ORDER — QUETIAPINE FUMARATE 25 MG/1
12.5 TABLET ORAL AT BEDTIME
Refills: 0 | Status: DISCONTINUED | OUTPATIENT
Start: 2024-09-09 | End: 2024-09-13

## 2024-09-09 RX ADMIN — Medication 750 MILLIGRAM(S): at 21:49

## 2024-09-09 RX ADMIN — Medication 750 MILLIGRAM(S): at 09:49

## 2024-09-09 RX ADMIN — METFORMIN HYDROCHLORIDE 1000 MILLIGRAM(S): 850 TABLET ORAL at 09:49

## 2024-09-09 RX ADMIN — Medication 400 UNIT(S): at 09:50

## 2024-09-09 RX ADMIN — Medication 75 MICROGRAM(S): at 06:21

## 2024-09-09 RX ADMIN — Medication 1 TABLET(S): at 21:49

## 2024-09-09 RX ADMIN — Medication 12.5 MILLIGRAM(S): at 15:16

## 2024-09-09 RX ADMIN — METFORMIN HYDROCHLORIDE 1000 MILLIGRAM(S): 850 TABLET ORAL at 21:49

## 2024-09-09 RX ADMIN — Medication 2 TABLET(S): at 21:50

## 2024-09-09 RX ADMIN — QUETIAPINE FUMARATE 12.5 MILLIGRAM(S): 25 TABLET ORAL at 21:49

## 2024-09-10 PROCEDURE — 99232 SBSQ HOSP IP/OBS MODERATE 35: CPT

## 2024-09-10 RX ADMIN — Medication 1 TABLET(S): at 21:21

## 2024-09-10 RX ADMIN — METOPROLOL SUCCINATE 50 MILLIGRAM(S): 50 TABLET, EXTENDED RELEASE ORAL at 09:21

## 2024-09-10 RX ADMIN — METFORMIN HYDROCHLORIDE 1000 MILLIGRAM(S): 850 TABLET ORAL at 21:21

## 2024-09-10 RX ADMIN — Medication 400 UNIT(S): at 09:21

## 2024-09-10 RX ADMIN — METFORMIN HYDROCHLORIDE 1000 MILLIGRAM(S): 850 TABLET ORAL at 09:21

## 2024-09-10 RX ADMIN — Medication 750 MILLIGRAM(S): at 21:21

## 2024-09-10 RX ADMIN — Medication 75 MICROGRAM(S): at 06:03

## 2024-09-10 RX ADMIN — Medication 2 TABLET(S): at 21:21

## 2024-09-10 RX ADMIN — QUETIAPINE FUMARATE 12.5 MILLIGRAM(S): 25 TABLET ORAL at 21:21

## 2024-09-10 RX ADMIN — Medication 750 MILLIGRAM(S): at 09:21

## 2024-09-11 PROCEDURE — 99232 SBSQ HOSP IP/OBS MODERATE 35: CPT

## 2024-09-11 RX ADMIN — Medication 400 UNIT(S): at 09:17

## 2024-09-11 RX ADMIN — Medication 2 TABLET(S): at 20:31

## 2024-09-11 RX ADMIN — Medication 750 MILLIGRAM(S): at 09:18

## 2024-09-11 RX ADMIN — METFORMIN HYDROCHLORIDE 1000 MILLIGRAM(S): 850 TABLET ORAL at 20:32

## 2024-09-11 RX ADMIN — METFORMIN HYDROCHLORIDE 1000 MILLIGRAM(S): 850 TABLET ORAL at 09:17

## 2024-09-11 RX ADMIN — Medication 75 MICROGRAM(S): at 05:42

## 2024-09-11 RX ADMIN — Medication 1 TABLET(S): at 20:32

## 2024-09-11 RX ADMIN — QUETIAPINE FUMARATE 12.5 MILLIGRAM(S): 25 TABLET ORAL at 20:31

## 2024-09-11 RX ADMIN — Medication 750 MILLIGRAM(S): at 20:32

## 2024-09-12 PROCEDURE — 99232 SBSQ HOSP IP/OBS MODERATE 35: CPT

## 2024-09-12 RX ORDER — MAGNESIUM CITRATE
296 SOLUTION, ORAL ORAL ONCE
Refills: 0 | Status: COMPLETED | OUTPATIENT
Start: 2024-09-12 | End: 2024-09-12

## 2024-09-12 RX ORDER — LACTULOSE 10 G/15ML
10 SOLUTION ORAL DAILY
Refills: 0 | Status: DISCONTINUED | OUTPATIENT
Start: 2024-09-12 | End: 2024-09-28

## 2024-09-12 RX ADMIN — Medication 750 MILLIGRAM(S): at 22:24

## 2024-09-12 RX ADMIN — METFORMIN HYDROCHLORIDE 1000 MILLIGRAM(S): 850 TABLET ORAL at 22:24

## 2024-09-12 RX ADMIN — METFORMIN HYDROCHLORIDE 1000 MILLIGRAM(S): 850 TABLET ORAL at 09:07

## 2024-09-12 RX ADMIN — Medication 75 MICROGRAM(S): at 05:54

## 2024-09-12 RX ADMIN — Medication 400 UNIT(S): at 09:07

## 2024-09-12 RX ADMIN — Medication 2 TABLET(S): at 22:24

## 2024-09-12 RX ADMIN — Medication 1 TABLET(S): at 22:24

## 2024-09-12 RX ADMIN — QUETIAPINE FUMARATE 12.5 MILLIGRAM(S): 25 TABLET ORAL at 22:23

## 2024-09-12 RX ADMIN — Medication 750 MILLIGRAM(S): at 09:07

## 2024-09-12 RX ADMIN — Medication 296 MILLILITER(S): at 14:52

## 2024-09-12 NOTE — PATIENT PROFILE ADULT - LEGAL HELP
Cardiology Note    Chief Complaint   Patient presents with    Atrial Fibrillation     F/v Dx:PAF (paroxysmal atrial fibrillation) (HCC)    Palpitations       History of Present Illness: Ashley Johansen is a 58 y.o. 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     Continues to do well. However has noted more chest pressure and dyspnea with exertion. Is under stress. Can exert and not limited there. Not associated with palpitations. No other cardiac complaints. No heart failure symptoms. Wanting to reduce medication burden especially flecainide she says. Compliant with medications and denies adverse effects. Has remained off amlodipine and vitals stable at home.    Review of Systems   Constitutional: Negative for decreased appetite, fever, malaise/fatigue, weight gain and weight loss.   HENT:  Negative for congestion and nosebleeds.    Eyes:  Negative for blurred vision.   Cardiovascular:  Negative for chest pain, claudication, dyspnea on exertion, irregular heartbeat, leg swelling, near-syncope, orthopnea, palpitations, paroxysmal nocturnal dyspnea and syncope.   Respiratory:  Negative for cough and shortness of breath.    Endocrine: Negative for cold intolerance and heat intolerance.   Skin:  Negative for rash.   Musculoskeletal:  Negative for back pain.   Gastrointestinal:  Negative for abdominal pain, constipation, diarrhea, heartburn, melena, nausea and vomiting.   Genitourinary:  Negative for dysuria, flank pain and hematuria.   Neurological:  Negative for dizziness.   Psychiatric/Behavioral:  Negative for altered mental status and depression.          Past Medical History:   Diagnosis Date    Allergy     Anesthesia     clautraphobic unable to tolerated mask    Apnea, sleep     Arthritis     hands and feet    Daytime sleepiness     sometimes    Gasping for breath     Hemorrhoids     Indigestion     Infectious disease 5/2014     step throat    Insomnia     Migraines     Morning headache     PAF (paroxysmal atrial fibrillation) ([facility]) 6/24/2020    Pleurisy          Past Surgical History:   Procedure Laterality Date    HYSTEROSCOPY NOVASURE-2  6/17/2014    Performed by [facility] M.D. at SURGERY SAME DAY [facility] ORS    TUBAL COAGULATION LAPAROSCOPIC BILATERAL  1998 1998    APPENDECTOMY      BOWEL RESECTION      CYSTECTOMY      EXPLORATORY LAPAROTOMY      for surgical adhesions, polyps    HEMORRHOIDECTOMY      PRIMARY C SECTION           Current Outpatient Medications   Medication Sig Dispense Refill    LANSOPRAZOLE PO lansoprazole      flecainide (TAMBOCOR) 50 MG tablet TAKE 1 TABLET BY MOUTH TWICE DAILY 180 Tablet 1    metoprolol SR (TOPROL XL) 25 MG TABLET SR 24 HR Take 1 Tablet by mouth every day. 90 Tablet 3    aspirin EC (ECOTRIN) 81 MG Tablet Delayed Response Take 1 tablet by mouth every day. 30 tablet     albuterol 108 (90 Base) MCG/ACT Aero Soln inhalation aerosol Inhale 2 Puffs by mouth every four hours as needed (wheezing). 1 Inhaler 0     No current facility-administered medications for this visit.         Allergies   Allergen Reactions    Codeine Vomiting, Swelling, Unspecified and Shortness of Breath     swelling    Kiwi Extract Hives    Other Environmental Hives     Kiwi fruit         Family History   Adopted: Yes   Problem Relation Age of Onset    Other Mother         hypothyroid    Diabetes Mother     Hypertension Mother     Hyperlipidemia Mother     Thyroid Mother     No Known Problems Father         Adopted by father    No Known Problems Maternal Grandmother     No Known Problems Maternal Grandfather     No Known Problems Sister         Half sister         Social History     Socioeconomic History    Marital status:      Spouse name: Not on file    Number of children: Not on file    Years of education: Not on file    Highest education level: Not on file   Occupational History    Not on file   Tobacco Use  "   Smoking status: Former     Current packs/day: 0.00     Average packs/day: 0.5 packs/day for 6.0 years (3.0 ttl pk-yrs)     Types: Cigarettes     Start date: 1990     Quit date: 1996     Years since quittin.7    Smokeless tobacco: Never   Vaping Use    Vaping status: Never Used   Substance and Sexual Activity    Alcohol use: Yes     Comment: occ    Drug use: No    Sexual activity: Yes   Other Topics Concern    Not on file   Social History Narrative    Not on file     Social Determinants of Health     Financial Resource Strain: Not on file   Food Insecurity: Not on file   Transportation Needs: Not on file   Physical Activity: Not on file   Stress: Not on file   Social Connections: Not on file   Intimate Partner Violence: Not on file   Housing Stability: Not on file         Physical Exam:  Ambulatory Vitals  /80 (BP Location: Left arm, Patient Position: Sitting, BP Cuff Size: Adult)   Pulse 65   Resp 16   Ht 1.549 m (5' 1\")   Wt 88.5 kg (195 lb)   SpO2 98%    BP Readings from Last 4 Encounters:   24 116/80   24 134/84   23 (!) 146/96   23 124/82     Weight/BMI:   Vitals:    24 1110   BP: 116/80   Weight: 88.5 kg (195 lb)   Height: 1.549 m (5' 1\")    Body mass index is 36.84 kg/m².  Wt Readings from Last 4 Encounters:   24 88.5 kg (195 lb)   24 87.5 kg (193 lb)   23 86.2 kg (190 lb)   23 87.5 kg (193 lb)       Physical Exam  Constitutional:       General: She is not in acute distress.  HENT:      Head: Normocephalic and atraumatic.   Eyes:      Conjunctiva/sclera: Conjunctivae normal.      Pupils: Pupils are equal, round, and reactive to light.   Neck:      Vascular: No JVD.   Cardiovascular:      Rate and Rhythm: Normal rate and regular rhythm.      Heart sounds: Normal heart sounds. No murmur heard.     No friction rub. No gallop.   Pulmonary:      Effort: Pulmonary effort is normal. No respiratory distress.      Breath sounds: Normal " breath sounds. No wheezing or rales.   Chest:      Chest wall: No tenderness.   Abdominal:      General: Bowel sounds are normal. There is no distension.      Palpations: Abdomen is soft.   Musculoskeletal:      Cervical back: Normal range of motion and neck supple.   Skin:     General: Skin is warm and dry.   Neurological:      Mental Status: She is alert and oriented to person, place, and time.   Psychiatric:         Mood and Affect: Affect normal.         Judgment: Judgment normal.         Lab Data Review:  Lab Results   Component Value Date/Time    CHOLSTRLTOT 216 (H) 11/11/2021 07:57 AM     (H) 11/11/2021 07:57 AM    HDL 66 11/11/2021 07:57 AM    TRIGLYCERIDE 97 11/11/2021 07:57 AM       Lab Results   Component Value Date/Time    SODIUM 140 04/07/2023 04:47 AM    SODIUM 140 11/11/2021 07:57 AM    POTASSIUM 4.4 04/07/2023 04:47 AM    POTASSIUM 4.2 11/11/2021 07:57 AM    CHLORIDE 105 04/07/2023 04:47 AM    CHLORIDE 105 11/11/2021 07:57 AM    CO2 25.0 04/07/2023 04:47 AM    CO2 24 11/11/2021 07:57 AM    GLUCOSE 128 04/07/2023 04:47 AM    GLUCOSE 102 (H) 11/11/2021 07:57 AM    BUN 14.0 04/07/2023 04:47 AM    BUN 17 11/11/2021 07:57 AM    CREATININE 0.9 04/07/2023 04:47 AM    CREATININE 0.98 11/11/2021 07:57 AM    BUNCREATRAT 15.6 04/07/2023 04:47 AM    BUNCREATRAT 15 10/16/2019 09:00 PM     CrCl cannot be calculated (Patient's most recent lab result is older than the maximum 7 days allowed.).  Lab Results   Component Value Date/Time    ALKPHOSPHAT 101 02/02/2023 02:32 PM    ALKPHOSPHAT 94 11/11/2021 07:57 AM    ASTSGOT 12 02/02/2023 02:32 PM    ASTSGOT 14 11/11/2021 07:57 AM    ALTSGPT 15 02/02/2023 02:32 PM    ALTSGPT 18 11/11/2021 07:57 AM    TBILIRUBIN 0.4 02/02/2023 02:32 PM    TBILIRUBIN 0.4 11/11/2021 07:57 AM      Lab Results   Component Value Date/Time    WBC 13.30 (H) 04/07/2023 04:47 AM    WBC 9.0 05/19/2022 01:08 PM     Lab Results   Component Value Date/Time    HBA1C 5.2 04/06/2023 04:54 AM     "HBA1C 5.3 10/16/2019 09:00 PM     No components found for: \"TROP\"      Cardiac Imaging and Procedures Review:      EKG 9/11/23 interpreted by me sinus elia 58  EKG 3/8/23 interpreted by me sinus, low voltage    ZioPatch Summary: 4-  1. Sinus rhythm with appropriate heart rate range. Average 86, range 46 to 250 bpm.   2. Normal sinus node and AV node conduction normal. No pauses.   3. Rare PACs.   4. Rare PVCs.   5. Paroxysmal atrial fibrillation, <1% burden, up to 7.5 minutes. Frequent SVT, up to 2 mins, up to 250 bpm.   6. 125 pt triggers during sinus, PAF, SVT, PVCs. Symptoms reported include racing, fluttering.   Conclusion: Sinus with frequent symptomatic fast SVT, paroxsymal atrial fibrillation.      Echo 4-  No prior study is available for comparison.   Left ventricular ejection fraction is visually estimated to be 65%.  Normal left atrial size.  No significant valve or doppler abnormality.     Nuclear stress test January 24, 2017  Exercised 6 minutes and 50 seconds achieving 7 METS, no concerning issues  Normal perfusion    Result Date: 2/2/2023 outside study [facility]  SMALL FOCUS OF OLD HEMORRHAGE IN THE LEFT POSTERIOR PARIETAL CENTRUM SEMIOVALE DEEP WHITE MATTER. OTHERWISE NEGATIVE EXAM. NO ACUTE ABNORMALITY.    Medical Decision Making:  Problem List Items Addressed This Visit       Other chest pain    Relevant Orders    NM-CARDIAC STRESS TEST    Dyspnea on exertion    Relevant Orders    NM-CARDIAC STRESS TEST    PAF (paroxysmal atrial fibrillation) (HCC)    Relevant Orders    EKG (Completed)    REFERRAL TO CARDIOLOGY       Blood pressure stable off antihypertensive amlodipine. Resolve issue.     Paroxysmal AF - controlled. Continue metoprolol and flecainide for now. Chadsvasc 1 only for gender continue aspirin. Refer to EP. If successful reduce medication burden.    It was my pleasure to meet with Ms. Johansen.                         " no

## 2024-09-13 PROCEDURE — 99232 SBSQ HOSP IP/OBS MODERATE 35: CPT

## 2024-09-13 RX ORDER — QUETIAPINE FUMARATE 25 MG/1
25 TABLET ORAL AT BEDTIME
Refills: 0 | Status: DISCONTINUED | OUTPATIENT
Start: 2024-09-13 | End: 2024-09-23

## 2024-09-13 RX ORDER — INFLUENZA A VIRUS A/IDAHO/07/2018 (H1N1) ANTIGEN (MDCK CELL DERIVED, PROPIOLACTONE INACTIVATED, INFLUENZA A VIRUS A/INDIANA/08/2018 (H3N2) ANTIGEN (MDCK CELL DERIVED, PROPIOLACTONE INACTIVATED), INFLUENZA B VIRUS B/SINGAPORE/INFTT-16-0610/2016 ANTIGEN (MDCK CELL DERIVED, PROPIOLACTONE INACTIVATED), INFLUENZA B VIRUS B/IOWA/06/2017 ANTIGEN (MDCK CELL DERIVED, PROPIOLACTONE INACTIVATED) 15; 15; 15; 15 UG/.5ML; UG/.5ML; UG/.5ML; UG/.5ML
0.5 INJECTION, SUSPENSION INTRAMUSCULAR ONCE
Refills: 0 | Status: DISCONTINUED | OUTPATIENT
Start: 2024-09-13 | End: 2024-09-28

## 2024-09-13 RX ADMIN — Medication 750 MILLIGRAM(S): at 10:13

## 2024-09-13 RX ADMIN — METOPROLOL SUCCINATE 50 MILLIGRAM(S): 50 TABLET, EXTENDED RELEASE ORAL at 10:13

## 2024-09-13 RX ADMIN — Medication 400 UNIT(S): at 10:13

## 2024-09-13 RX ADMIN — Medication 2 TABLET(S): at 20:26

## 2024-09-13 RX ADMIN — Medication 750 MILLIGRAM(S): at 20:26

## 2024-09-13 RX ADMIN — QUETIAPINE FUMARATE 25 MILLIGRAM(S): 25 TABLET ORAL at 20:26

## 2024-09-13 RX ADMIN — METFORMIN HYDROCHLORIDE 1000 MILLIGRAM(S): 850 TABLET ORAL at 20:26

## 2024-09-13 RX ADMIN — METFORMIN HYDROCHLORIDE 1000 MILLIGRAM(S): 850 TABLET ORAL at 10:14

## 2024-09-13 RX ADMIN — Medication 1 TABLET(S): at 20:26

## 2024-09-14 RX ADMIN — METFORMIN HYDROCHLORIDE 1000 MILLIGRAM(S): 850 TABLET ORAL at 20:10

## 2024-09-14 RX ADMIN — Medication 750 MILLIGRAM(S): at 08:54

## 2024-09-14 RX ADMIN — QUETIAPINE FUMARATE 25 MILLIGRAM(S): 25 TABLET ORAL at 20:10

## 2024-09-14 RX ADMIN — Medication 750 MILLIGRAM(S): at 20:10

## 2024-09-14 RX ADMIN — Medication 75 MICROGRAM(S): at 06:07

## 2024-09-14 RX ADMIN — METOPROLOL SUCCINATE 50 MILLIGRAM(S): 50 TABLET, EXTENDED RELEASE ORAL at 08:53

## 2024-09-14 RX ADMIN — LACTULOSE 10 GRAM(S): 10 SOLUTION ORAL at 08:54

## 2024-09-14 RX ADMIN — METFORMIN HYDROCHLORIDE 1000 MILLIGRAM(S): 850 TABLET ORAL at 08:53

## 2024-09-14 RX ADMIN — Medication 400 UNIT(S): at 08:53

## 2024-09-14 RX ADMIN — Medication 1 TABLET(S): at 20:10

## 2024-09-14 RX ADMIN — Medication 2 TABLET(S): at 20:10

## 2024-09-15 PROCEDURE — 99232 SBSQ HOSP IP/OBS MODERATE 35: CPT

## 2024-09-15 RX ORDER — DIPHENHYDRAMINE HCL 12.5MG/5ML
25 ELIXIR ORAL ONCE
Refills: 0 | Status: COMPLETED | OUTPATIENT
Start: 2024-09-15 | End: 2024-09-15

## 2024-09-15 RX ORDER — FLUTICASONE PROPIONATE 50 UG/1
1 SPRAY, METERED NASAL
Refills: 0 | Status: DISCONTINUED | OUTPATIENT
Start: 2024-09-15 | End: 2024-09-28

## 2024-09-15 RX ADMIN — METFORMIN HYDROCHLORIDE 1000 MILLIGRAM(S): 850 TABLET ORAL at 08:58

## 2024-09-15 RX ADMIN — Medication 750 MILLIGRAM(S): at 20:48

## 2024-09-15 RX ADMIN — Medication 750 MILLIGRAM(S): at 08:58

## 2024-09-15 RX ADMIN — METOPROLOL SUCCINATE 50 MILLIGRAM(S): 50 TABLET, EXTENDED RELEASE ORAL at 08:58

## 2024-09-15 RX ADMIN — LACTULOSE 10 GRAM(S): 10 SOLUTION ORAL at 08:58

## 2024-09-15 RX ADMIN — Medication 400 UNIT(S): at 08:58

## 2024-09-15 RX ADMIN — Medication 1 TABLET(S): at 20:47

## 2024-09-15 RX ADMIN — QUETIAPINE FUMARATE 25 MILLIGRAM(S): 25 TABLET ORAL at 20:47

## 2024-09-15 RX ADMIN — Medication 25 MILLIGRAM(S): at 04:00

## 2024-09-15 RX ADMIN — Medication 75 MICROGRAM(S): at 06:20

## 2024-09-15 RX ADMIN — FLUTICASONE PROPIONATE 1 SPRAY(S): 50 SPRAY, METERED NASAL at 20:48

## 2024-09-15 RX ADMIN — METFORMIN HYDROCHLORIDE 1000 MILLIGRAM(S): 850 TABLET ORAL at 20:47

## 2024-09-16 PROCEDURE — 99232 SBSQ HOSP IP/OBS MODERATE 35: CPT

## 2024-09-16 RX ORDER — SODIUM CHLORIDE 0.65 %
1 AEROSOL, SPRAY (ML) NASAL
Refills: 0 | Status: DISCONTINUED | OUTPATIENT
Start: 2024-09-16 | End: 2024-09-28

## 2024-09-16 RX ADMIN — METFORMIN HYDROCHLORIDE 1000 MILLIGRAM(S): 850 TABLET ORAL at 21:49

## 2024-09-16 RX ADMIN — LACTULOSE 10 GRAM(S): 10 SOLUTION ORAL at 11:02

## 2024-09-16 RX ADMIN — Medication 400 UNIT(S): at 10:15

## 2024-09-16 RX ADMIN — Medication 1 TABLET(S): at 21:49

## 2024-09-16 RX ADMIN — Medication 75 MICROGRAM(S): at 06:14

## 2024-09-16 RX ADMIN — Medication 750 MILLIGRAM(S): at 21:49

## 2024-09-16 RX ADMIN — Medication 2 TABLET(S): at 21:49

## 2024-09-16 RX ADMIN — FLUTICASONE PROPIONATE 1 SPRAY(S): 50 SPRAY, METERED NASAL at 11:02

## 2024-09-16 RX ADMIN — QUETIAPINE FUMARATE 25 MILLIGRAM(S): 25 TABLET ORAL at 21:49

## 2024-09-16 RX ADMIN — Medication 750 MILLIGRAM(S): at 10:16

## 2024-09-16 RX ADMIN — METFORMIN HYDROCHLORIDE 1000 MILLIGRAM(S): 850 TABLET ORAL at 10:15

## 2024-09-17 PROCEDURE — 99232 SBSQ HOSP IP/OBS MODERATE 35: CPT

## 2024-09-17 RX ADMIN — Medication 2 TABLET(S): at 20:10

## 2024-09-17 RX ADMIN — QUETIAPINE FUMARATE 25 MILLIGRAM(S): 25 TABLET ORAL at 20:10

## 2024-09-17 RX ADMIN — LACTULOSE 10 GRAM(S): 10 SOLUTION ORAL at 10:14

## 2024-09-17 RX ADMIN — Medication 400 UNIT(S): at 10:13

## 2024-09-17 RX ADMIN — METFORMIN HYDROCHLORIDE 1000 MILLIGRAM(S): 850 TABLET ORAL at 20:09

## 2024-09-17 RX ADMIN — Medication 750 MILLIGRAM(S): at 10:13

## 2024-09-17 RX ADMIN — Medication 1 TABLET(S): at 20:09

## 2024-09-17 RX ADMIN — Medication 75 MICROGRAM(S): at 05:57

## 2024-09-17 RX ADMIN — METFORMIN HYDROCHLORIDE 1000 MILLIGRAM(S): 850 TABLET ORAL at 10:13

## 2024-09-17 RX ADMIN — Medication 750 MILLIGRAM(S): at 20:09

## 2024-09-17 RX ADMIN — METOPROLOL SUCCINATE 50 MILLIGRAM(S): 50 TABLET, EXTENDED RELEASE ORAL at 10:13

## 2024-09-18 PROCEDURE — 99232 SBSQ HOSP IP/OBS MODERATE 35: CPT

## 2024-09-18 RX ORDER — MAGNESIUM CITRATE
296 SOLUTION, ORAL ORAL ONCE
Refills: 0 | Status: COMPLETED | OUTPATIENT
Start: 2024-09-18 | End: 2024-09-18

## 2024-09-18 RX ORDER — QUETIAPINE FUMARATE 25 MG/1
25 TABLET ORAL ONCE
Refills: 0 | Status: COMPLETED | OUTPATIENT
Start: 2024-09-18 | End: 2024-09-18

## 2024-09-18 RX ADMIN — METOPROLOL SUCCINATE 50 MILLIGRAM(S): 50 TABLET, EXTENDED RELEASE ORAL at 09:41

## 2024-09-18 RX ADMIN — Medication 750 MILLIGRAM(S): at 21:14

## 2024-09-18 RX ADMIN — Medication 2 TABLET(S): at 21:14

## 2024-09-18 RX ADMIN — Medication 750 MILLIGRAM(S): at 09:39

## 2024-09-18 RX ADMIN — Medication 296 MILLILITER(S): at 18:12

## 2024-09-18 RX ADMIN — FLUTICASONE PROPIONATE 1 SPRAY(S): 50 SPRAY, METERED NASAL at 21:14

## 2024-09-18 RX ADMIN — METFORMIN HYDROCHLORIDE 1000 MILLIGRAM(S): 850 TABLET ORAL at 09:40

## 2024-09-18 RX ADMIN — FLUTICASONE PROPIONATE 1 SPRAY(S): 50 SPRAY, METERED NASAL at 10:17

## 2024-09-18 RX ADMIN — METFORMIN HYDROCHLORIDE 1000 MILLIGRAM(S): 850 TABLET ORAL at 21:14

## 2024-09-18 RX ADMIN — QUETIAPINE FUMARATE 25 MILLIGRAM(S): 25 TABLET ORAL at 21:14

## 2024-09-18 RX ADMIN — QUETIAPINE FUMARATE 25 MILLIGRAM(S): 25 TABLET ORAL at 01:54

## 2024-09-18 RX ADMIN — Medication 1 TABLET(S): at 21:14

## 2024-09-18 RX ADMIN — Medication 75 MICROGRAM(S): at 06:16

## 2024-09-18 RX ADMIN — LACTULOSE 10 GRAM(S): 10 SOLUTION ORAL at 09:47

## 2024-09-18 RX ADMIN — Medication 400 UNIT(S): at 09:40

## 2024-09-19 ENCOUNTER — APPOINTMENT (OUTPATIENT)
Dept: UROGYNECOLOGY | Facility: CLINIC | Age: 74
End: 2024-09-19

## 2024-09-19 PROCEDURE — 99232 SBSQ HOSP IP/OBS MODERATE 35: CPT

## 2024-09-19 RX ORDER — MELATONIN 5 MG
5 TABLET ORAL AT BEDTIME
Refills: 0 | Status: DISCONTINUED | OUTPATIENT
Start: 2024-09-19 | End: 2024-09-28

## 2024-09-19 RX ADMIN — Medication 75 MICROGRAM(S): at 05:52

## 2024-09-19 RX ADMIN — LACTULOSE 10 GRAM(S): 10 SOLUTION ORAL at 10:49

## 2024-09-19 RX ADMIN — METOPROLOL SUCCINATE 50 MILLIGRAM(S): 50 TABLET, EXTENDED RELEASE ORAL at 10:34

## 2024-09-19 RX ADMIN — METFORMIN HYDROCHLORIDE 1000 MILLIGRAM(S): 850 TABLET ORAL at 10:50

## 2024-09-19 RX ADMIN — Medication 1 TABLET(S): at 20:09

## 2024-09-19 RX ADMIN — FLUTICASONE PROPIONATE 1 SPRAY(S): 50 SPRAY, METERED NASAL at 20:11

## 2024-09-19 RX ADMIN — Medication 400 UNIT(S): at 10:49

## 2024-09-19 RX ADMIN — METFORMIN HYDROCHLORIDE 1000 MILLIGRAM(S): 850 TABLET ORAL at 20:09

## 2024-09-19 RX ADMIN — Medication 2 TABLET(S): at 20:09

## 2024-09-19 RX ADMIN — Medication 5 MILLIGRAM(S): at 20:09

## 2024-09-19 RX ADMIN — Medication 750 MILLIGRAM(S): at 20:09

## 2024-09-19 RX ADMIN — Medication 750 MILLIGRAM(S): at 10:49

## 2024-09-19 RX ADMIN — QUETIAPINE FUMARATE 25 MILLIGRAM(S): 25 TABLET ORAL at 20:09

## 2024-09-20 PROCEDURE — 99232 SBSQ HOSP IP/OBS MODERATE 35: CPT

## 2024-09-20 RX ADMIN — Medication 400 UNIT(S): at 09:45

## 2024-09-20 RX ADMIN — Medication 750 MILLIGRAM(S): at 09:46

## 2024-09-20 RX ADMIN — METOPROLOL SUCCINATE 50 MILLIGRAM(S): 50 TABLET, EXTENDED RELEASE ORAL at 09:46

## 2024-09-20 RX ADMIN — Medication 2 TABLET(S): at 22:17

## 2024-09-20 RX ADMIN — Medication 1 TABLET(S): at 22:17

## 2024-09-20 RX ADMIN — METFORMIN HYDROCHLORIDE 1000 MILLIGRAM(S): 850 TABLET ORAL at 09:46

## 2024-09-20 RX ADMIN — Medication 5 MILLIGRAM(S): at 22:17

## 2024-09-20 RX ADMIN — QUETIAPINE FUMARATE 25 MILLIGRAM(S): 25 TABLET ORAL at 22:17

## 2024-09-20 RX ADMIN — METFORMIN HYDROCHLORIDE 1000 MILLIGRAM(S): 850 TABLET ORAL at 22:17

## 2024-09-20 RX ADMIN — Medication 750 MILLIGRAM(S): at 22:17

## 2024-09-20 RX ADMIN — Medication 1 SPRAY(S): at 06:28

## 2024-09-20 RX ADMIN — LACTULOSE 10 GRAM(S): 10 SOLUTION ORAL at 09:57

## 2024-09-20 RX ADMIN — FLUTICASONE PROPIONATE 1 SPRAY(S): 50 SPRAY, METERED NASAL at 22:18

## 2024-09-21 RX ADMIN — Medication 5 MILLIGRAM(S): at 21:54

## 2024-09-21 RX ADMIN — QUETIAPINE FUMARATE 25 MILLIGRAM(S): 25 TABLET ORAL at 21:53

## 2024-09-21 RX ADMIN — Medication 400 UNIT(S): at 09:00

## 2024-09-21 RX ADMIN — Medication 750 MILLIGRAM(S): at 21:53

## 2024-09-21 RX ADMIN — Medication 2 TABLET(S): at 21:52

## 2024-09-21 RX ADMIN — Medication 1 SPRAY(S): at 21:51

## 2024-09-21 RX ADMIN — METOPROLOL SUCCINATE 50 MILLIGRAM(S): 50 TABLET, EXTENDED RELEASE ORAL at 09:11

## 2024-09-21 RX ADMIN — Medication 2 MILLIGRAM(S): at 09:01

## 2024-09-21 RX ADMIN — METFORMIN HYDROCHLORIDE 1000 MILLIGRAM(S): 850 TABLET ORAL at 09:00

## 2024-09-21 RX ADMIN — Medication 1 TABLET(S): at 21:53

## 2024-09-21 RX ADMIN — LACTULOSE 10 GRAM(S): 10 SOLUTION ORAL at 09:00

## 2024-09-21 RX ADMIN — METFORMIN HYDROCHLORIDE 1000 MILLIGRAM(S): 850 TABLET ORAL at 21:53

## 2024-09-21 RX ADMIN — Medication 750 MILLIGRAM(S): at 09:00

## 2024-09-22 RX ADMIN — Medication 1 SPRAY(S): at 21:59

## 2024-09-22 RX ADMIN — Medication 75 MICROGRAM(S): at 05:49

## 2024-09-22 RX ADMIN — METFORMIN HYDROCHLORIDE 1000 MILLIGRAM(S): 850 TABLET ORAL at 21:52

## 2024-09-22 RX ADMIN — Medication 400 UNIT(S): at 08:21

## 2024-09-22 RX ADMIN — METOPROLOL SUCCINATE 50 MILLIGRAM(S): 50 TABLET, EXTENDED RELEASE ORAL at 08:22

## 2024-09-22 RX ADMIN — LACTULOSE 10 GRAM(S): 10 SOLUTION ORAL at 08:21

## 2024-09-22 RX ADMIN — Medication 1 SPRAY(S): at 06:12

## 2024-09-22 RX ADMIN — Medication 1 TABLET(S): at 21:52

## 2024-09-22 RX ADMIN — Medication 5 MILLIGRAM(S): at 21:52

## 2024-09-22 RX ADMIN — Medication 750 MILLIGRAM(S): at 21:52

## 2024-09-22 RX ADMIN — METFORMIN HYDROCHLORIDE 1000 MILLIGRAM(S): 850 TABLET ORAL at 08:22

## 2024-09-22 RX ADMIN — Medication 2 TABLET(S): at 21:53

## 2024-09-22 RX ADMIN — QUETIAPINE FUMARATE 25 MILLIGRAM(S): 25 TABLET ORAL at 21:52

## 2024-09-22 RX ADMIN — Medication 750 MILLIGRAM(S): at 08:21

## 2024-09-23 PROCEDURE — 99232 SBSQ HOSP IP/OBS MODERATE 35: CPT

## 2024-09-23 RX ORDER — FLUPHENAZINE HCL 10 MG
12.5 TABLET ORAL ONCE
Refills: 0 | Status: COMPLETED | OUTPATIENT
Start: 2024-09-24 | End: 2024-09-24

## 2024-09-23 RX ORDER — QUETIAPINE FUMARATE 25 MG/1
37.5 TABLET ORAL AT BEDTIME
Refills: 0 | Status: DISCONTINUED | OUTPATIENT
Start: 2024-09-23 | End: 2024-09-24

## 2024-09-23 RX ADMIN — Medication 750 MILLIGRAM(S): at 08:41

## 2024-09-23 RX ADMIN — Medication 1 TABLET(S): at 21:36

## 2024-09-23 RX ADMIN — Medication 2 TABLET(S): at 21:36

## 2024-09-23 RX ADMIN — METFORMIN HYDROCHLORIDE 1000 MILLIGRAM(S): 850 TABLET ORAL at 08:41

## 2024-09-23 RX ADMIN — Medication 5 MILLIGRAM(S): at 21:36

## 2024-09-23 RX ADMIN — QUETIAPINE FUMARATE 37.5 MILLIGRAM(S): 25 TABLET ORAL at 21:35

## 2024-09-23 RX ADMIN — METFORMIN HYDROCHLORIDE 1000 MILLIGRAM(S): 850 TABLET ORAL at 21:36

## 2024-09-23 RX ADMIN — LACTULOSE 10 GRAM(S): 10 SOLUTION ORAL at 08:41

## 2024-09-23 RX ADMIN — Medication 750 MILLIGRAM(S): at 21:37

## 2024-09-23 RX ADMIN — Medication 400 UNIT(S): at 08:41

## 2024-09-23 RX ADMIN — Medication 75 MICROGRAM(S): at 06:27

## 2024-09-23 RX ADMIN — METOPROLOL SUCCINATE 50 MILLIGRAM(S): 50 TABLET, EXTENDED RELEASE ORAL at 08:41

## 2024-09-24 PROCEDURE — 99232 SBSQ HOSP IP/OBS MODERATE 35: CPT

## 2024-09-24 RX ORDER — QUETIAPINE FUMARATE 25 MG/1
50 TABLET ORAL AT BEDTIME
Refills: 0 | Status: DISCONTINUED | OUTPATIENT
Start: 2024-09-24 | End: 2024-09-28

## 2024-09-24 RX ADMIN — QUETIAPINE FUMARATE 50 MILLIGRAM(S): 25 TABLET ORAL at 22:36

## 2024-09-24 RX ADMIN — LACTULOSE 10 GRAM(S): 10 SOLUTION ORAL at 10:09

## 2024-09-24 RX ADMIN — Medication 12.5 MILLIGRAM(S): at 12:58

## 2024-09-24 RX ADMIN — METFORMIN HYDROCHLORIDE 1000 MILLIGRAM(S): 850 TABLET ORAL at 22:35

## 2024-09-24 RX ADMIN — Medication 1 TABLET(S): at 22:36

## 2024-09-24 RX ADMIN — Medication 5 MILLIGRAM(S): at 22:36

## 2024-09-24 RX ADMIN — Medication 1 SPRAY(S): at 22:36

## 2024-09-24 RX ADMIN — Medication 2 TABLET(S): at 22:36

## 2024-09-24 RX ADMIN — Medication 1 SPRAY(S): at 10:11

## 2024-09-24 RX ADMIN — METFORMIN HYDROCHLORIDE 1000 MILLIGRAM(S): 850 TABLET ORAL at 10:10

## 2024-09-24 RX ADMIN — Medication 750 MILLIGRAM(S): at 22:35

## 2024-09-24 RX ADMIN — METOPROLOL SUCCINATE 50 MILLIGRAM(S): 50 TABLET, EXTENDED RELEASE ORAL at 10:10

## 2024-09-24 RX ADMIN — Medication 750 MILLIGRAM(S): at 10:10

## 2024-09-24 RX ADMIN — Medication 400 UNIT(S): at 10:10

## 2024-09-24 NOTE — BH CONSULTATION LIAISON PROGRESS NOTE - OTHER
attentive compared to prior, impaired concentration moving extremities, not cooperative with physical exam Quality 226: Preventive Care And Screening: Tobacco Use: Screening And Cessation Intervention: Patient screened for tobacco use and is an ex/non-smoker Detail Level: Detailed Quality 130: Documentation Of Current Medications In The Medical Record: Current Medications Documented repeatedly putting pillow over her head during assessment not overtly responding to internal stimuli. Pt did not answer when asked about subjective experience unstable posture, lying back in bed with head leaning over toward bed rail

## 2024-09-25 PROCEDURE — 99232 SBSQ HOSP IP/OBS MODERATE 35: CPT

## 2024-09-25 RX ADMIN — Medication 1 TABLET(S): at 22:01

## 2024-09-25 RX ADMIN — QUETIAPINE FUMARATE 50 MILLIGRAM(S): 25 TABLET ORAL at 22:01

## 2024-09-25 RX ADMIN — METFORMIN HYDROCHLORIDE 1000 MILLIGRAM(S): 850 TABLET ORAL at 08:56

## 2024-09-25 RX ADMIN — Medication 5 MILLIGRAM(S): at 22:01

## 2024-09-25 RX ADMIN — METOPROLOL SUCCINATE 50 MILLIGRAM(S): 50 TABLET, EXTENDED RELEASE ORAL at 08:55

## 2024-09-25 RX ADMIN — Medication 750 MILLIGRAM(S): at 22:00

## 2024-09-25 RX ADMIN — METFORMIN HYDROCHLORIDE 1000 MILLIGRAM(S): 850 TABLET ORAL at 22:00

## 2024-09-25 RX ADMIN — Medication 750 MILLIGRAM(S): at 08:56

## 2024-09-25 RX ADMIN — LACTULOSE 10 GRAM(S): 10 SOLUTION ORAL at 08:56

## 2024-09-25 RX ADMIN — Medication 400 UNIT(S): at 08:55

## 2024-09-25 RX ADMIN — Medication 2 TABLET(S): at 22:00

## 2024-09-26 LAB
ALBUMIN SERPL ELPH-MCNC: 3.7 G/DL — SIGNIFICANT CHANGE UP (ref 3.3–5)
ALP SERPL-CCNC: 77 U/L — SIGNIFICANT CHANGE UP (ref 40–120)
ALT FLD-CCNC: 21 U/L — SIGNIFICANT CHANGE UP (ref 4–33)
ANION GAP SERPL CALC-SCNC: 8 MMOL/L — SIGNIFICANT CHANGE UP (ref 7–14)
AST SERPL-CCNC: 46 U/L — HIGH (ref 4–32)
BASOPHILS # BLD AUTO: 0.03 K/UL — SIGNIFICANT CHANGE UP (ref 0–0.2)
BASOPHILS NFR BLD AUTO: 0.5 % — SIGNIFICANT CHANGE UP (ref 0–2)
BILIRUB SERPL-MCNC: 0.2 MG/DL — SIGNIFICANT CHANGE UP (ref 0.2–1.2)
BUN SERPL-MCNC: 19 MG/DL — SIGNIFICANT CHANGE UP (ref 7–23)
CALCIUM SERPL-MCNC: 9.5 MG/DL — SIGNIFICANT CHANGE UP (ref 8.4–10.5)
CHLORIDE SERPL-SCNC: 104 MMOL/L — SIGNIFICANT CHANGE UP (ref 98–107)
CO2 SERPL-SCNC: 26 MMOL/L — SIGNIFICANT CHANGE UP (ref 22–31)
CREAT SERPL-MCNC: 0.82 MG/DL — SIGNIFICANT CHANGE UP (ref 0.5–1.3)
EGFR: 76 ML/MIN/1.73M2 — SIGNIFICANT CHANGE UP
EGFR: 76 ML/MIN/1.73M2 — SIGNIFICANT CHANGE UP
EOSINOPHIL # BLD AUTO: 0.19 K/UL — SIGNIFICANT CHANGE UP (ref 0–0.5)
EOSINOPHIL NFR BLD AUTO: 3.4 % — SIGNIFICANT CHANGE UP (ref 0–6)
GLUCOSE SERPL-MCNC: 94 MG/DL — SIGNIFICANT CHANGE UP (ref 70–99)
HCT VFR BLD CALC: 39 % — SIGNIFICANT CHANGE UP (ref 34.5–45)
HGB BLD-MCNC: 12.6 G/DL — SIGNIFICANT CHANGE UP (ref 11.5–15.5)
IANC: 3.11 K/UL — SIGNIFICANT CHANGE UP (ref 1.8–7.4)
IMM GRANULOCYTES NFR BLD AUTO: 0.4 % — SIGNIFICANT CHANGE UP (ref 0–0.9)
LYMPHOCYTES # BLD AUTO: 1.5 K/UL — SIGNIFICANT CHANGE UP (ref 1–3.3)
LYMPHOCYTES # BLD AUTO: 27.2 % — SIGNIFICANT CHANGE UP (ref 13–44)
MCHC RBC-ENTMCNC: 26.8 PG — LOW (ref 27–34)
MCHC RBC-ENTMCNC: 32.3 GM/DL — SIGNIFICANT CHANGE UP (ref 32–36)
MCV RBC AUTO: 83 FL — SIGNIFICANT CHANGE UP (ref 80–100)
MONOCYTES # BLD AUTO: 0.67 K/UL — SIGNIFICANT CHANGE UP (ref 0–0.9)
MONOCYTES NFR BLD AUTO: 12.1 % — SIGNIFICANT CHANGE UP (ref 2–14)
NEUTROPHILS # BLD AUTO: 3.11 K/UL — SIGNIFICANT CHANGE UP (ref 1.8–7.4)
NEUTROPHILS NFR BLD AUTO: 56.4 % — SIGNIFICANT CHANGE UP (ref 43–77)
NRBC # BLD AUTO: 0 K/UL — SIGNIFICANT CHANGE UP (ref 0–0)
NRBC # BLD: 0 /100 WBCS — SIGNIFICANT CHANGE UP (ref 0–0)
NRBC # FLD: 0 K/UL — SIGNIFICANT CHANGE UP (ref 0–0)
NRBC BLD-RTO: 0 /100 WBCS — SIGNIFICANT CHANGE UP (ref 0–0)
PLATELET # BLD AUTO: 172 K/UL — SIGNIFICANT CHANGE UP (ref 150–400)
POTASSIUM SERPL-MCNC: 6 MMOL/L — HIGH (ref 3.5–5.3)
POTASSIUM SERPL-SCNC: 6 MMOL/L — HIGH (ref 3.5–5.3)
PROT SERPL-MCNC: 7.9 G/DL — SIGNIFICANT CHANGE UP (ref 6–8.3)
RBC # BLD: 4.7 M/UL — SIGNIFICANT CHANGE UP (ref 3.8–5.2)
RBC # FLD: 15.1 % — HIGH (ref 10.3–14.5)
SODIUM SERPL-SCNC: 138 MMOL/L — SIGNIFICANT CHANGE UP (ref 135–145)
VALPROATE SERPL-MCNC: 55.8 UG/ML — SIGNIFICANT CHANGE UP (ref 50–100)
WBC # BLD: 5.52 K/UL — SIGNIFICANT CHANGE UP (ref 3.8–10.5)
WBC # FLD AUTO: 5.52 K/UL — SIGNIFICANT CHANGE UP (ref 3.8–10.5)

## 2024-09-26 PROCEDURE — 99232 SBSQ HOSP IP/OBS MODERATE 35: CPT

## 2024-09-26 RX ORDER — QUETIAPINE FUMARATE 25 MG/1
25 TABLET ORAL DAILY
Refills: 0 | Status: DISCONTINUED | OUTPATIENT
Start: 2024-09-26 | End: 2024-09-27

## 2024-09-26 RX ADMIN — Medication 1 TABLET(S): at 20:32

## 2024-09-26 RX ADMIN — Medication 1 SPRAY(S): at 14:11

## 2024-09-26 RX ADMIN — FLUTICASONE PROPIONATE 1 SPRAY(S): 50 SPRAY, METERED NASAL at 20:33

## 2024-09-26 RX ADMIN — Medication 2 TABLET(S): at 20:32

## 2024-09-26 RX ADMIN — QUETIAPINE FUMARATE 50 MILLIGRAM(S): 25 TABLET ORAL at 20:32

## 2024-09-26 RX ADMIN — Medication 75 MICROGRAM(S): at 06:02

## 2024-09-26 RX ADMIN — Medication 750 MILLIGRAM(S): at 14:10

## 2024-09-26 RX ADMIN — METOPROLOL SUCCINATE 50 MILLIGRAM(S): 50 TABLET, EXTENDED RELEASE ORAL at 14:10

## 2024-09-26 RX ADMIN — Medication 750 MILLIGRAM(S): at 20:34

## 2024-09-26 RX ADMIN — QUETIAPINE FUMARATE 25 MILLIGRAM(S): 25 TABLET ORAL at 14:10

## 2024-09-26 RX ADMIN — METFORMIN HYDROCHLORIDE 1000 MILLIGRAM(S): 850 TABLET ORAL at 14:10

## 2024-09-26 RX ADMIN — METFORMIN HYDROCHLORIDE 1000 MILLIGRAM(S): 850 TABLET ORAL at 20:32

## 2024-09-26 RX ADMIN — Medication 5 MILLIGRAM(S): at 20:33

## 2024-09-26 RX ADMIN — Medication 400 UNIT(S): at 14:10

## 2024-09-27 LAB
ANION GAP SERPL CALC-SCNC: 13 MMOL/L — SIGNIFICANT CHANGE UP (ref 7–14)
BUN SERPL-MCNC: 22 MG/DL — SIGNIFICANT CHANGE UP (ref 7–23)
CALCIUM SERPL-MCNC: 9.5 MG/DL — SIGNIFICANT CHANGE UP (ref 8.4–10.5)
CHLORIDE SERPL-SCNC: 105 MMOL/L — SIGNIFICANT CHANGE UP (ref 98–107)
CO2 SERPL-SCNC: 23 MMOL/L — SIGNIFICANT CHANGE UP (ref 22–31)
CREAT SERPL-MCNC: 0.9 MG/DL — SIGNIFICANT CHANGE UP (ref 0.5–1.3)
EGFR: 68 ML/MIN/1.73M2 — SIGNIFICANT CHANGE UP
EGFR: 68 ML/MIN/1.73M2 — SIGNIFICANT CHANGE UP
GLUCOSE SERPL-MCNC: 94 MG/DL — SIGNIFICANT CHANGE UP (ref 70–99)
MAGNESIUM SERPL-MCNC: 1.7 MG/DL — SIGNIFICANT CHANGE UP (ref 1.6–2.6)
PHOSPHATE SERPL-MCNC: 3.4 MG/DL — SIGNIFICANT CHANGE UP (ref 2.5–4.5)
POTASSIUM SERPL-MCNC: 4.8 MMOL/L — SIGNIFICANT CHANGE UP (ref 3.5–5.3)
POTASSIUM SERPL-SCNC: 4.8 MMOL/L — SIGNIFICANT CHANGE UP (ref 3.5–5.3)
SODIUM SERPL-SCNC: 141 MMOL/L — SIGNIFICANT CHANGE UP (ref 135–145)
TSH SERPL-MCNC: 3.89 UIU/ML — SIGNIFICANT CHANGE UP (ref 0.27–4.2)

## 2024-09-27 PROCEDURE — 99232 SBSQ HOSP IP/OBS MODERATE 35: CPT

## 2024-09-27 RX ORDER — QUETIAPINE FUMARATE 25 MG/1
50 TABLET ORAL DAILY
Refills: 0 | Status: DISCONTINUED | OUTPATIENT
Start: 2024-09-27 | End: 2024-09-28

## 2024-09-27 RX ORDER — FLUPHENAZINE HCL 10 MG
5 TABLET ORAL ONCE
Refills: 0 | Status: COMPLETED | OUTPATIENT
Start: 2024-09-27 | End: 2024-09-27

## 2024-09-27 RX ORDER — QUETIAPINE FUMARATE 25 MG/1
37.5 TABLET ORAL DAILY
Refills: 0 | Status: DISCONTINUED | OUTPATIENT
Start: 2024-09-27 | End: 2024-09-27

## 2024-09-27 RX ADMIN — Medication 750 MILLIGRAM(S): at 21:03

## 2024-09-27 RX ADMIN — METFORMIN HYDROCHLORIDE 1000 MILLIGRAM(S): 850 TABLET ORAL at 10:49

## 2024-09-27 RX ADMIN — Medication 2 TABLET(S): at 21:03

## 2024-09-27 RX ADMIN — Medication 400 UNIT(S): at 10:50

## 2024-09-27 RX ADMIN — METFORMIN HYDROCHLORIDE 1000 MILLIGRAM(S): 850 TABLET ORAL at 21:03

## 2024-09-27 RX ADMIN — Medication 1 SPRAY(S): at 10:50

## 2024-09-27 RX ADMIN — QUETIAPINE FUMARATE 25 MILLIGRAM(S): 25 TABLET ORAL at 10:49

## 2024-09-27 RX ADMIN — Medication 750 MILLIGRAM(S): at 10:49

## 2024-09-27 RX ADMIN — Medication 5 MILLIGRAM(S): at 13:18

## 2024-09-27 RX ADMIN — Medication 1 TABLET(S): at 21:03

## 2024-09-27 RX ADMIN — QUETIAPINE FUMARATE 50 MILLIGRAM(S): 25 TABLET ORAL at 21:04

## 2024-09-27 RX ADMIN — Medication 1 SPRAY(S): at 21:04

## 2024-09-27 RX ADMIN — Medication 5 MILLIGRAM(S): at 21:04

## 2024-09-27 RX ADMIN — METOPROLOL SUCCINATE 50 MILLIGRAM(S): 50 TABLET, EXTENDED RELEASE ORAL at 10:49

## 2024-09-28 RX ORDER — LORAZEPAM 4 MG/ML
1 VIAL (ML) INJECTION ONCE
Refills: 0 | Status: DISCONTINUED | OUTPATIENT
Start: 2024-09-28 | End: 2024-09-28

## 2024-09-28 RX ORDER — FLUPHENAZINE HCL 10 MG
5 TABLET ORAL ONCE
Refills: 0 | Status: COMPLETED | OUTPATIENT
Start: 2024-09-28 | End: 2024-09-28

## 2024-09-28 RX ADMIN — Medication 2 TABLET(S): at 21:29

## 2024-09-28 RX ADMIN — Medication 5 MILLIGRAM(S): at 06:33

## 2024-09-28 RX ADMIN — Medication 1 TABLET(S): at 21:30

## 2024-09-28 RX ADMIN — METFORMIN HYDROCHLORIDE 1000 MILLIGRAM(S): 850 TABLET ORAL at 09:26

## 2024-09-28 RX ADMIN — Medication 2 MILLIGRAM(S): at 21:30

## 2024-09-28 RX ADMIN — Medication 1 MILLIGRAM(S): at 21:29

## 2024-09-28 RX ADMIN — FLUTICASONE PROPIONATE 1 SPRAY(S): 50 SPRAY, METERED NASAL at 21:00

## 2024-09-28 RX ADMIN — QUETIAPINE FUMARATE 50 MILLIGRAM(S): 25 TABLET ORAL at 21:30

## 2024-09-28 RX ADMIN — Medication 750 MILLIGRAM(S): at 09:25

## 2024-09-28 RX ADMIN — Medication 1 MILLIGRAM(S): at 06:33

## 2024-09-28 RX ADMIN — Medication 750 MILLIGRAM(S): at 21:29

## 2024-09-28 RX ADMIN — LACTULOSE 10 GRAM(S): 10 SOLUTION ORAL at 09:26

## 2024-09-28 RX ADMIN — Medication 5 MILLIGRAM(S): at 21:29

## 2024-09-28 RX ADMIN — Medication 400 UNIT(S): at 09:26

## 2024-09-28 RX ADMIN — METFORMIN HYDROCHLORIDE 1000 MILLIGRAM(S): 850 TABLET ORAL at 21:29

## 2024-09-28 RX ADMIN — METOPROLOL SUCCINATE 50 MILLIGRAM(S): 50 TABLET, EXTENDED RELEASE ORAL at 09:25

## 2024-09-28 RX ADMIN — QUETIAPINE FUMARATE 50 MILLIGRAM(S): 25 TABLET ORAL at 09:27

## 2024-09-29 ENCOUNTER — INPATIENT (INPATIENT)
Facility: HOSPITAL | Age: 74
LOS: 21 days | Discharge: PSYCHIATRIC OR SUBSTANCE USE FACILITY | End: 2024-10-21
Attending: INTERNAL MEDICINE | Admitting: INTERNAL MEDICINE
Payer: MEDICARE

## 2024-09-29 VITALS
DIASTOLIC BLOOD PRESSURE: 78 MMHG | RESPIRATION RATE: 15 BRPM | HEART RATE: 73 BPM | SYSTOLIC BLOOD PRESSURE: 121 MMHG | TEMPERATURE: 97 F | OXYGEN SATURATION: 98 %

## 2024-09-29 VITALS
HEART RATE: 85 BPM | DIASTOLIC BLOOD PRESSURE: 76 MMHG | RESPIRATION RATE: 16 BRPM | OXYGEN SATURATION: 100 % | SYSTOLIC BLOOD PRESSURE: 152 MMHG

## 2024-09-29 DIAGNOSIS — R41.82 ALTERED MENTAL STATUS, UNSPECIFIED: ICD-10-CM

## 2024-09-29 DIAGNOSIS — G40.909 EPILEPSY, UNSPECIFIED, NOT INTRACTABLE, WITHOUT STATUS EPILEPTICUS: ICD-10-CM

## 2024-09-29 DIAGNOSIS — F20.9 SCHIZOPHRENIA, UNSPECIFIED: ICD-10-CM

## 2024-09-29 DIAGNOSIS — F33.9 MAJOR DEPRESSIVE DISORDER, RECURRENT, UNSPECIFIED: ICD-10-CM

## 2024-09-29 DIAGNOSIS — E03.9 HYPOTHYROIDISM, UNSPECIFIED: ICD-10-CM

## 2024-09-29 DIAGNOSIS — Z29.9 ENCOUNTER FOR PROPHYLACTIC MEASURES, UNSPECIFIED: ICD-10-CM

## 2024-09-29 DIAGNOSIS — E11.9 TYPE 2 DIABETES MELLITUS WITHOUT COMPLICATIONS: ICD-10-CM

## 2024-09-29 DIAGNOSIS — G20 PARKINSON'S DISEASE: ICD-10-CM

## 2024-09-29 DIAGNOSIS — Z79.899 OTHER LONG TERM (CURRENT) DRUG THERAPY: ICD-10-CM

## 2024-09-29 LAB
24R-OH-CALCIDIOL SERPL-MCNC: 43.6 NG/ML — SIGNIFICANT CHANGE UP (ref 30–80)
A1C WITH ESTIMATED AVERAGE GLUCOSE RESULT: 7.3 % — HIGH (ref 4–5.6)
ADD ON TEST-SPECIMEN IN LAB: SIGNIFICANT CHANGE UP
ALBUMIN SERPL ELPH-MCNC: 3.7 G/DL — SIGNIFICANT CHANGE UP (ref 3.3–5)
ALP SERPL-CCNC: 83 U/L — SIGNIFICANT CHANGE UP (ref 40–120)
ALT FLD-CCNC: 24 U/L — SIGNIFICANT CHANGE UP (ref 4–33)
AMMONIA BLD-MCNC: 67 UMOL/L — HIGH (ref 11–55)
ANION GAP SERPL CALC-SCNC: 11 MMOL/L — SIGNIFICANT CHANGE UP (ref 7–14)
ANION GAP SERPL CALC-SCNC: 13 MMOL/L — SIGNIFICANT CHANGE UP (ref 7–14)
APTT BLD: 36.7 SEC — HIGH (ref 24.5–35.6)
AST SERPL-CCNC: 36 U/L — HIGH (ref 4–32)
BASOPHILS # BLD AUTO: 0.02 K/UL — SIGNIFICANT CHANGE UP (ref 0–0.2)
BASOPHILS NFR BLD AUTO: 0.4 % — SIGNIFICANT CHANGE UP (ref 0–2)
BILIRUB SERPL-MCNC: <0.2 MG/DL — SIGNIFICANT CHANGE UP (ref 0.2–1.2)
BUN SERPL-MCNC: 18 MG/DL — SIGNIFICANT CHANGE UP (ref 7–23)
BUN SERPL-MCNC: 26 MG/DL — HIGH (ref 7–23)
CALCIUM SERPL-MCNC: 9 MG/DL — SIGNIFICANT CHANGE UP (ref 8.4–10.5)
CALCIUM SERPL-MCNC: 9.5 MG/DL — SIGNIFICANT CHANGE UP (ref 8.4–10.5)
CHLORIDE SERPL-SCNC: 105 MMOL/L — SIGNIFICANT CHANGE UP (ref 98–107)
CHLORIDE SERPL-SCNC: 107 MMOL/L — SIGNIFICANT CHANGE UP (ref 98–107)
CK SERPL-CCNC: 377 U/L — HIGH (ref 25–170)
CO2 SERPL-SCNC: 23 MMOL/L — SIGNIFICANT CHANGE UP (ref 22–31)
CO2 SERPL-SCNC: 25 MMOL/L — SIGNIFICANT CHANGE UP (ref 22–31)
CREAT SERPL-MCNC: 0.81 MG/DL — SIGNIFICANT CHANGE UP (ref 0.5–1.3)
CREAT SERPL-MCNC: 1.2 MG/DL — SIGNIFICANT CHANGE UP (ref 0.5–1.3)
CRP SERPL-MCNC: <3 MG/L — SIGNIFICANT CHANGE UP
EGFR: 48 ML/MIN/1.73M2 — LOW
EGFR: 78 ML/MIN/1.73M2 — SIGNIFICANT CHANGE UP
EOSINOPHIL # BLD AUTO: 0.18 K/UL — SIGNIFICANT CHANGE UP (ref 0–0.5)
EOSINOPHIL NFR BLD AUTO: 3.6 % — SIGNIFICANT CHANGE UP (ref 0–6)
ERYTHROCYTE [SEDIMENTATION RATE] IN BLOOD: 14 MM/HR — SIGNIFICANT CHANGE UP (ref 4–25)
ESTIMATED AVERAGE GLUCOSE: 163 — SIGNIFICANT CHANGE UP
FOLATE SERPL-MCNC: 19.8 NG/ML — HIGH (ref 3.1–17.5)
GLUCOSE SERPL-MCNC: 110 MG/DL — HIGH (ref 70–99)
GLUCOSE SERPL-MCNC: 81 MG/DL — SIGNIFICANT CHANGE UP (ref 70–99)
HCT VFR BLD CALC: 36.5 % — SIGNIFICANT CHANGE UP (ref 34.5–45)
HCT VFR BLD CALC: 38.5 % — SIGNIFICANT CHANGE UP (ref 34.5–45)
HCYS SERPL-MCNC: 8.8 UMOL/L — SIGNIFICANT CHANGE UP
HGB BLD-MCNC: 11.7 G/DL — SIGNIFICANT CHANGE UP (ref 11.5–15.5)
HGB BLD-MCNC: 12.5 G/DL — SIGNIFICANT CHANGE UP (ref 11.5–15.5)
IANC: 2.75 K/UL — SIGNIFICANT CHANGE UP (ref 1.8–7.4)
IMM GRANULOCYTES NFR BLD AUTO: 0.2 % — SIGNIFICANT CHANGE UP (ref 0–0.9)
INR BLD: 1.24 RATIO — HIGH (ref 0.85–1.16)
LACTATE BLDV-MCNC: 2.3 MMOL/L — HIGH (ref 0.5–2)
LACTATE SERPL-SCNC: 1.9 MMOL/L — SIGNIFICANT CHANGE UP (ref 0.5–2)
LYMPHOCYTES # BLD AUTO: 1.43 K/UL — SIGNIFICANT CHANGE UP (ref 1–3.3)
LYMPHOCYTES # BLD AUTO: 28.7 % — SIGNIFICANT CHANGE UP (ref 13–44)
MAGNESIUM SERPL-MCNC: 1.6 MG/DL — SIGNIFICANT CHANGE UP (ref 1.6–2.6)
MCHC RBC-ENTMCNC: 26.7 PG — LOW (ref 27–34)
MCHC RBC-ENTMCNC: 27.5 PG — SIGNIFICANT CHANGE UP (ref 27–34)
MCHC RBC-ENTMCNC: 32.1 GM/DL — SIGNIFICANT CHANGE UP (ref 32–36)
MCHC RBC-ENTMCNC: 32.5 GM/DL — SIGNIFICANT CHANGE UP (ref 32–36)
MCV RBC AUTO: 83.3 FL — SIGNIFICANT CHANGE UP (ref 80–100)
MCV RBC AUTO: 84.8 FL — SIGNIFICANT CHANGE UP (ref 80–100)
MONOCYTES # BLD AUTO: 0.6 K/UL — SIGNIFICANT CHANGE UP (ref 0–0.9)
MONOCYTES NFR BLD AUTO: 12 % — SIGNIFICANT CHANGE UP (ref 2–14)
NEUTROPHILS # BLD AUTO: 2.75 K/UL — SIGNIFICANT CHANGE UP (ref 1.8–7.4)
NEUTROPHILS NFR BLD AUTO: 55.1 % — SIGNIFICANT CHANGE UP (ref 43–77)
NRBC # BLD: 0 /100 WBCS — SIGNIFICANT CHANGE UP (ref 0–0)
NRBC # BLD: 0 /100 WBCS — SIGNIFICANT CHANGE UP (ref 0–0)
NRBC # FLD: 0 K/UL — SIGNIFICANT CHANGE UP (ref 0–0)
NRBC # FLD: 0 K/UL — SIGNIFICANT CHANGE UP (ref 0–0)
PHOSPHATE SERPL-MCNC: 3 MG/DL — SIGNIFICANT CHANGE UP (ref 2.5–4.5)
PLATELET # BLD AUTO: 145 K/UL — LOW (ref 150–400)
PLATELET # BLD AUTO: 160 K/UL — SIGNIFICANT CHANGE UP (ref 150–400)
POTASSIUM SERPL-MCNC: 3.7 MMOL/L — SIGNIFICANT CHANGE UP (ref 3.5–5.3)
POTASSIUM SERPL-MCNC: 4.2 MMOL/L — SIGNIFICANT CHANGE UP (ref 3.5–5.3)
POTASSIUM SERPL-SCNC: 3.7 MMOL/L — SIGNIFICANT CHANGE UP (ref 3.5–5.3)
POTASSIUM SERPL-SCNC: 4.2 MMOL/L — SIGNIFICANT CHANGE UP (ref 3.5–5.3)
PROT SERPL-MCNC: 7.1 G/DL — SIGNIFICANT CHANGE UP (ref 6–8.3)
PROTHROM AB SERPL-ACNC: 14.4 SEC — HIGH (ref 9.9–13.4)
RBC # BLD: 4.38 M/UL — SIGNIFICANT CHANGE UP (ref 3.8–5.2)
RBC # BLD: 4.54 M/UL — SIGNIFICANT CHANGE UP (ref 3.8–5.2)
RBC # FLD: 15.4 % — HIGH (ref 10.3–14.5)
RBC # FLD: 15.5 % — HIGH (ref 10.3–14.5)
SODIUM SERPL-SCNC: 141 MMOL/L — SIGNIFICANT CHANGE UP (ref 135–145)
SODIUM SERPL-SCNC: 143 MMOL/L — SIGNIFICANT CHANGE UP (ref 135–145)
TROPONIN T, HIGH SENSITIVITY RESULT: 10 NG/L — SIGNIFICANT CHANGE UP
TSH SERPL-MCNC: 4.99 UIU/ML — HIGH (ref 0.27–4.2)
VIT B12 SERPL-MCNC: 794 PG/ML — SIGNIFICANT CHANGE UP (ref 200–900)
WBC # BLD: 4.64 K/UL — SIGNIFICANT CHANGE UP (ref 3.8–10.5)
WBC # BLD: 4.99 K/UL — SIGNIFICANT CHANGE UP (ref 3.8–10.5)
WBC # FLD AUTO: 4.64 K/UL — SIGNIFICANT CHANGE UP (ref 3.8–10.5)
WBC # FLD AUTO: 4.99 K/UL — SIGNIFICANT CHANGE UP (ref 3.8–10.5)

## 2024-09-29 PROCEDURE — 95720 EEG PHY/QHP EA INCR W/VEEG: CPT

## 2024-09-29 PROCEDURE — 70450 CT HEAD/BRAIN W/O DYE: CPT | Mod: 26,59,MC

## 2024-09-29 PROCEDURE — 0042T: CPT | Mod: MC

## 2024-09-29 PROCEDURE — 99223 1ST HOSP IP/OBS HIGH 75: CPT

## 2024-09-29 PROCEDURE — 99223 1ST HOSP IP/OBS HIGH 75: CPT | Mod: GC

## 2024-09-29 PROCEDURE — 71045 X-RAY EXAM CHEST 1 VIEW: CPT | Mod: 26

## 2024-09-29 PROCEDURE — 70498 CT ANGIOGRAPHY NECK: CPT | Mod: 26,MC

## 2024-09-29 PROCEDURE — 70496 CT ANGIOGRAPHY HEAD: CPT | Mod: 26,MC

## 2024-09-29 PROCEDURE — 99291 CRITICAL CARE FIRST HOUR: CPT

## 2024-09-29 RX ORDER — ACETAMINOPHEN 500 MG
650 TABLET ORAL EVERY 6 HOURS
Refills: 0 | Status: DISCONTINUED | OUTPATIENT
Start: 2024-09-29 | End: 2024-10-21

## 2024-09-29 RX ORDER — LEVOTHYROXINE SODIUM 88 MCG
1 TABLET ORAL
Refills: 0 | DISCHARGE

## 2024-09-29 RX ORDER — CHOLECALCIFEROL (VITAMIN D3) 625 MCG
1 CAPSULE ORAL
Refills: 0 | DISCHARGE

## 2024-09-29 RX ORDER — LEVOTHYROXINE SODIUM 88 MCG
75 TABLET ORAL DAILY
Refills: 0 | Status: DISCONTINUED | OUTPATIENT
Start: 2024-09-30 | End: 2024-10-21

## 2024-09-29 RX ORDER — VALPROIC ACID 250 MG/5ML
750 SOLUTION ORAL
Refills: 0 | Status: DISCONTINUED | OUTPATIENT
Start: 2024-09-29 | End: 2024-09-29

## 2024-09-29 RX ORDER — MELATONIN 5 MG
5 TABLET ORAL AT BEDTIME
Refills: 0 | Status: DISCONTINUED | OUTPATIENT
Start: 2024-09-29 | End: 2024-09-29

## 2024-09-29 RX ORDER — INSULIN LISPRO 100/ML
VIAL (ML) SUBCUTANEOUS
Refills: 0 | Status: DISCONTINUED | OUTPATIENT
Start: 2024-09-29 | End: 2024-10-21

## 2024-09-29 RX ORDER — FLUTICASONE PROPIONATE 50 UG/1
1 SPRAY, METERED NASAL
Refills: 0 | Status: DISCONTINUED | OUTPATIENT
Start: 2024-09-29 | End: 2024-10-20

## 2024-09-29 RX ORDER — LEVOTHYROXINE SODIUM 88 MCG
75 TABLET ORAL ONCE
Refills: 0 | Status: COMPLETED | OUTPATIENT
Start: 2024-09-29 | End: 2024-09-29

## 2024-09-29 RX ORDER — ENOXAPARIN SODIUM 40MG/0.4ML
40 SYRINGE (ML) SUBCUTANEOUS EVERY 24 HOURS
Refills: 0 | Status: DISCONTINUED | OUTPATIENT
Start: 2024-09-29 | End: 2024-10-21

## 2024-09-29 RX ORDER — SIMETHICONE 80 MG/1
80 TABLET, CHEWABLE ORAL THREE TIMES A DAY
Refills: 0 | Status: DISCONTINUED | OUTPATIENT
Start: 2024-09-29 | End: 2024-10-20

## 2024-09-29 RX ORDER — ALBUTEROL 90 MCG
2 AEROSOL (GRAM) INHALATION EVERY 6 HOURS
Refills: 0 | Status: DISCONTINUED | OUTPATIENT
Start: 2024-09-29 | End: 2024-10-21

## 2024-09-29 RX ORDER — LACTULOSE 10 G/15 ML
10 SOLUTION, ORAL ORAL DAILY
Refills: 0 | Status: COMPLETED | OUTPATIENT
Start: 2024-09-29 | End: 2024-10-02

## 2024-09-29 RX ORDER — B-COMPLEX WITH VITAMIN C
1 VIAL (ML) INJECTION DAILY
Refills: 0 | Status: DISCONTINUED | OUTPATIENT
Start: 2024-09-29 | End: 2024-10-21

## 2024-09-29 RX ORDER — LACTULOSE 10 G/15 ML
15 SOLUTION, ORAL ORAL
Refills: 0 | DISCHARGE

## 2024-09-29 RX ORDER — INSULIN LISPRO 100/ML
VIAL (ML) SUBCUTANEOUS AT BEDTIME
Refills: 0 | Status: DISCONTINUED | OUTPATIENT
Start: 2024-09-29 | End: 2024-10-21

## 2024-09-29 RX ORDER — GLUCAGON INJECTION, SOLUTION 1 MG/.2ML
1 INJECTION, SOLUTION SUBCUTANEOUS ONCE
Refills: 0 | Status: DISCONTINUED | OUTPATIENT
Start: 2024-09-29 | End: 2024-10-21

## 2024-09-29 RX ORDER — CHOLECALCIFEROL (VITAMIN D3) 625 MCG
1000 CAPSULE ORAL DAILY
Refills: 0 | Status: DISCONTINUED | OUTPATIENT
Start: 2024-09-29 | End: 2024-10-21

## 2024-09-29 RX ORDER — MELATONIN 5 MG
6 TABLET ORAL AT BEDTIME
Refills: 0 | Status: DISCONTINUED | OUTPATIENT
Start: 2024-09-29 | End: 2024-10-21

## 2024-09-29 RX ORDER — METFORMIN HYDROCHLORIDE 500 MG/1
1 TABLET, EXTENDED RELEASE ORAL
Refills: 0 | DISCHARGE

## 2024-09-29 RX ORDER — LEVOTHYROXINE SODIUM 88 MCG
TABLET ORAL
Refills: 0 | Status: DISCONTINUED | OUTPATIENT
Start: 2024-09-29 | End: 2024-10-21

## 2024-09-29 RX ORDER — DIVALPROEX SODIUM 250 MG/1
750 TABLET, FILM COATED, EXTENDED RELEASE ORAL
Refills: 0 | Status: DISCONTINUED | OUTPATIENT
Start: 2024-09-29 | End: 2024-10-02

## 2024-09-29 RX ORDER — QUETIAPINE FUMARATE 200 MG/1
50 TABLET ORAL DAILY
Refills: 0 | Status: DISCONTINUED | OUTPATIENT
Start: 2024-09-29 | End: 2024-10-07

## 2024-09-29 RX ORDER — SENNA 187 MG
2 TABLET ORAL AT BEDTIME
Refills: 0 | Status: DISCONTINUED | OUTPATIENT
Start: 2024-09-29 | End: 2024-10-21

## 2024-09-29 RX ORDER — METOPROLOL TARTRATE 50 MG
50 TABLET ORAL DAILY
Refills: 0 | Status: DISCONTINUED | OUTPATIENT
Start: 2024-09-29 | End: 2024-10-21

## 2024-09-29 RX ORDER — MAGNESIUM, ALUMINUM HYDROXIDE 200-200 MG
30 TABLET,CHEWABLE ORAL EVERY 4 HOURS
Refills: 0 | Status: DISCONTINUED | OUTPATIENT
Start: 2024-09-29 | End: 2024-10-21

## 2024-09-29 RX ORDER — MELATONIN 5 MG
1 TABLET ORAL
Refills: 0 | DISCHARGE

## 2024-09-29 RX ORDER — QUETIAPINE FUMARATE 200 MG/1
50 TABLET ORAL AT BEDTIME
Refills: 0 | Status: DISCONTINUED | OUTPATIENT
Start: 2024-09-29 | End: 2024-10-07

## 2024-09-29 RX ORDER — INSULIN LISPRO 100/ML
VIAL (ML) SUBCUTANEOUS EVERY 6 HOURS
Refills: 0 | Status: DISCONTINUED | OUTPATIENT
Start: 2024-09-29 | End: 2024-09-29

## 2024-09-29 RX ADMIN — DIVALPROEX SODIUM 750 MILLIGRAM(S): 250 TABLET, FILM COATED, EXTENDED RELEASE ORAL at 18:48

## 2024-09-29 RX ADMIN — QUETIAPINE FUMARATE 50 MILLIGRAM(S): 200 TABLET ORAL at 21:30

## 2024-09-29 RX ADMIN — Medication 6 MILLIGRAM(S): at 21:30

## 2024-09-29 RX ADMIN — Medication 2 TABLET(S): at 21:30

## 2024-09-29 RX ADMIN — VALPROIC ACID 57.5 MILLIGRAM(S): 250 SOLUTION ORAL at 09:36

## 2024-09-29 RX ADMIN — Medication 75 MICROGRAM(S): at 15:49

## 2024-09-29 NOTE — ED PROVIDER NOTE - ST/T WAVE
The patient location is: [place]  The chief complaint leading to consultation is: HTN   Visit type: Virtual visit with synchronous audio and video  Total time spent with patient: 10 min  Each patient to whom he or she provides medical services by telemedicine is:  (1) informed of the relationship between the physician and patient and the respective role of any other health care provider with respect to management of the patient; and (2) notified that he or she may decline to receive medical services by telemedicine and may withdraw from such care at any time.    Notes:     HTN - controlled     Severe obesity - lost weight with Adipex and holidays.  237 lb now - lost 4 lb.  PreDM - new on labs from earlier this year.      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  No hip or knee pain     Spinal tumor - monitored by neurosurgeon.  Gets 6/10 pain with long walks ie at work.      Review of Systems      Physical Exam      Assessment:       1. Essential hypertension    2. Obesity (BMI 30-39.9)    3. Routine physical examination        Plan:       Essential hypertension    Obesity (BMI 30-39.9)  -     phentermine (ADIPEX-P) 37.5 mg tablet; Take 1 tablet (37.5 mg total) by mouth before breakfast.  Dispense: 30 tablet; Refill: 0    Routine physical examination  -     CBC Auto Differential; Future; Expected date: 06/18/2024  -     Comprehensive Metabolic Panel; Future; Expected date: 06/18/2024  -     Hemoglobin A1C; Future; Expected date: 06/18/2024  -     Lipid Panel; Future; Expected date: 06/18/2024  -     PSA, Screening; Future; Expected date: 06/18/2024            Medication List with Changes/Refills   Current Medications    ACETAMINOPHEN (TYLENOL) 500 MG TABLET    Take 1,000 mg by mouth every 6 (six) hours as needed for Pain.    AMLODIPINE (NORVASC) 5 MG TABLET    TAKE 1 TABLET(5 MG) BY MOUTH EVERY DAY    ASPIRIN (ECOTRIN) 325 MG EC TABLET    Take 1 tablet (325 mg total) by mouth once daily.     FENOFIBRATE 160 MG TAB    TAKE 1 TABLET(160 MG) BY MOUTH EVERY DAY    HYDROCHLOROTHIAZIDE (HYDRODIURIL) 25 MG TABLET    TAKE 1 TABLET(25 MG) BY MOUTH EVERY DAY    ICOSAPENT ETHYL (VASCEPA) 1 GRAM CAP    Take 2 capsules (2 g total) by mouth 2 (two) times daily.    LISINOPRIL (PRINIVIL,ZESTRIL) 40 MG TABLET    TAKE 2 TABLETS(80 MG) BY MOUTH EVERY DAY   Changed and/or Refilled Medications    Modified Medication Previous Medication    PHENTERMINE (ADIPEX-P) 37.5 MG TABLET phentermine (ADIPEX-P) 37.5 mg tablet       Take 1 tablet (37.5 mg total) by mouth before breakfast.    Take 1 tablet (37.5 mg total) by mouth before breakfast.       Continue current management and monitor.    Counseled on regular exercise, maintenance of a healthy weight, balanced diet rich in fruits/vegetables and lean protein, and avoidance of unhealthy habits like smoking and excessive alcohol intake.   Also, counseled on importance of being compliant with medication, health appointments, diet and exercise.     Follow up in about 4 months (around 6/19/2024).  Annual, ?Adipex     no acute/subacute ischemic changes

## 2024-09-29 NOTE — SWALLOW BEDSIDE ASSESSMENT ADULT - ASR SWALLOW RECOMMEND DIAG
2. Cinesophagram to rule-out silent aspiration given, "r/o aspiration," indicated in order description and history of Parkinson's Diease.

## 2024-09-29 NOTE — ED PROVIDER NOTE - CLINICAL SUMMARY MEDICAL DECISION MAKING FREE TEXT BOX
73F h/o dementia, seizure d/o, depression, HTN, HLD found down at [facility] - pt did receive ativan for agitation which could be contributing to current mental status, though given pupil findings, will get immediate CTH to assess for ICH. Code stroke called at triage to expedite CT and neuro assessment. Will also perform infectious workup including UA/UC and CXR, cbc to assess for evidence of systemic infection. Lesser concern for ischemic stroke though will also get perfusion scan. Pt was found in urine, which could suggest seizure given her known seizure history. Dispo pending workup.

## 2024-09-29 NOTE — OCCUPATIONAL THERAPY INITIAL EVALUATION ADULT - ADDITIONAL COMMENTS
Pt is poor historian, please follow social work notes for social history. As per EMR, pt from [facility]. Pt reports she was ambulating independently without a device and was independent with ADLs prior to admission.   Pt left supine in bed in NAD, all lines intact,  (+) alarm, call bell in reach, RN aware.

## 2024-09-29 NOTE — SWALLOW BEDSIDE ASSESSMENT ADULT - CONSISTENCIES ADMINISTERED
4 teaspoons/pureed 3 ounces, each/moderately thick/mildly thick 1/2 cracker square/soft & bite-sized 4 ounces/thin liquid

## 2024-09-29 NOTE — ED ADULT NURSE REASSESSMENT NOTE - NS ED NURSE REASSESS COMMENT FT1
Assume care of patient after returning from break. Patient is on stretcher. Asked patient how she was doing and she stated that she was cold. Patient is drowsy and didn't answer any other questions. No distress noted. Chest expansion symmetric. Aid at bedside. Vitals signs stable.

## 2024-09-29 NOTE — ED PROVIDER NOTE - CRITICAL CARE ATTENDING CONTRIBUTION TO CARE
Upon my evaluation, this patient is at high risk for imminent or life threatening deterioration due to concern for acute intracranial hemorrhage or CVA requiring rapid and frequent reassessment and other active medical issues which require my direct attention, intervention, and personal management.  I have personally spent  47   discontinuous minutes  of critical care time exclusive of time spent on separate billing procedures. This includes review of laboratory data, radiology results, discussion with primary team, and monitoring for potential decompensation. Interventions were performed as documented above.

## 2024-09-29 NOTE — CONSULT NOTE ADULT - ASSESSMENT
Assessment:    Impression:    Recs:   Assessment:  72yo F, [facility] resident [facility] HTN, T2DM, HLD, parkinson's, hypothyroid, seizure disorder, major depressive disorder was brought to the ED as a code stroke for AMS, unilateral dilated pupil after an unwitnessed fall around 0030 9/29. She was in her normal state i.e. able to communicate, ambulate with assistance around 2200, was bit agitated after that, received ativan 1mg, seroquel 1mg around 2200. Last check was around 0001, when she was at baseline. At around 0030, she was found on the floor, turned to left, in pool of urine, very confused and unable to communicate afterwards. On arrival at ED, she was very drowsy, eye opening to noxious stimuli, withdrawing to pain in all 4 limbs, notably right pupil dilated and nonreactive. VSS. After the CT scan and while in the room, she was waking up a bit, but still drowsy(this is not baseline), eye opening to voice, able to say name, identify people and objects, not oriented to place, oriented to time, following commands, able to hold all 4 limbs against gravity, mild weakness on RLE, pain limited. She was able to recall the fall when she mentioned she was changing clothes and fell down, maybe tripped, maybe hit her head. Unclear seizure hx, on divalproex 750BID. VSS. CTH/CTA unremarkable.     Impression:   Altered mentation, now improving could be post concussive(head strike) vs post ictal(urinary incontenence, seizure hx, post ictal confusion) vs medication related (ativan, seroquel). Unilateral dilated pupil(R) could be from possible medication (albuterol prn), could be pathological.     Recs:  Investigational:  []rEEG  []MRI brain to rule out intracranial pathology  [] Toxic Metabolic workup: Infectious workup (BCx, UCx, UA, CXR, RVP+COVID), CBC, CMP, Mg, Phos, Vitamin B1, B6, B12, Folate, Vitamin D 25OH, Vitamin E, TSH, FT4, Ammonia, Lactate, CK, Homocysteine, MMA, Syphillis, ESR, CRP, WNV, Lyme, Utox.      Therapeutic:  []AED recommendations: continue divalproex 750mg BID for now  []First line rescue: 2 mg IV Ativan or 5 mg IV valium PRN STAT for seizure activity or GTC > 3 minutes or significant derangement of vital signs.  []Second line rescue: 1500 mg IV Keppra over 15 minutes for seizures refractory to ativan/valium.    Miscellaneous:  [] Q4H neurological checks and vital signs  [] If pt has a convulsion, please document accurately the lenght of episode and specifically what the pt was doing paying attention to eye blinking vs. closure, gaze deviation, shaking of extremities, tongue biting, urinary incontinence, any derangements of vital signs  (Generalized motor seizures can have dilated and unreactive pupils, absent oculocephalic reflexes (no dolls eyes), and open eyelids (99% of cases). Head turning from sided to side, pelvic thrusting, bicycling movements, hand waving are NOT manifestations of generalized motor seizures.)  [] Seizure, fall and aspiration precautions. Avoid sleep deprivation. Advise pt not to drive, operate heavy machinery, avoid heights, pools, bathtubs, locked doors.     Recommend non-pharmacological interventions to prevent/treat delirium  [] maintain day/night light cycles  [] optimize sleep-wake cycle, minimize environmental noise  [] reorientation frequently  [] use verbal redirection as first line  [] minimize restraints and lines  [] ensure good bladder/bowel function  [] ensure adequate pain control  [] minimize use of anticholinergic, antihistaminic, and benzodiazepine medications.    Case discussed with telestroke attending Dr. Tadeo. To be seen by attending Dr. Jones in AM.       Assessment:  72yo F, [facility] resident [facility] HTN, T2DM, HLD, parkinson's, hypothyroid, seizure disorder, major depressive disorder was brought to the ED as a code stroke for AMS, unilateral dilated pupil after an unwitnessed fall around 0030 9/29. She was in her normal state i.e. able to communicate, ambulate with assistance around 2200, was bit agitated after that, received ativan 1mg, seroquel 1mg around 2200. Last check was around 0001, when she was at baseline. At around 0030, she was found on the floor, turned to left, in pool of urine, very confused and unable to communicate afterwards. On arrival at ED, she was very drowsy, eye opening to noxious stimuli, withdrawing to pain in all 4 limbs, notably right pupil dilated and nonreactive. VSS. After the CT scan and while in the room, she was waking up a bit, but still drowsy(this is not baseline), eye opening to voice, able to say name, identify people and objects, not oriented to place, oriented to time, following commands, able to hold all 4 limbs against gravity, mild weakness on RLE, pain limited. She was able to recall the fall when she mentioned she was changing clothes and fell down, maybe tripped, maybe hit her head. Unclear seizure hx, on divalproex 750BID. VSS. CTH/CTA unremarkable. Notably anisocoria was noted in 01/2024.    Impression:   Altered mentation, now improving could be post concussive(head strike) vs post ictal(urinary incontenence, seizure hx, post ictal confusion) vs medication related (ativan, seroquel). Unilateral dilated pupil(R) could be from possible medication (albuterol prn), could be pathological (noted in 01/2024).     Recs:  Investigational:  []rEEG  []MRI brain to rule out intracranial pathology  [] Toxic Metabolic workup: Infectious workup (BCx, UCx, UA, CXR, RVP+COVID), CBC, CMP, Mg, Phos, Vitamin B1, B6, B12, Folate, Vitamin D 25OH, Vitamin E, TSH, FT4, Ammonia, Lactate, CK, Homocysteine, MMA, Syphillis, ESR, CRP, WNV, Lyme, Utox.      Therapeutic:  []AED recommendations: continue divalproex 750mg BID for now  []First line rescue: 2 mg IV Ativan or 5 mg IV valium PRN STAT for seizure activity or GTC > 3 minutes or significant derangement of vital signs.  []Second line rescue: 1500 mg IV Keppra over 15 minutes for seizures refractory to ativan/valium.    Miscellaneous:  [] Q4H neurological checks and vital signs  [] If pt has a convulsion, please document accurately the lenght of episode and specifically what the pt was doing paying attention to eye blinking vs. closure, gaze deviation, shaking of extremities, tongue biting, urinary incontinence, any derangements of vital signs  (Generalized motor seizures can have dilated and unreactive pupils, absent oculocephalic reflexes (no dolls eyes), and open eyelids (99% of cases). Head turning from sided to side, pelvic thrusting, bicycling movements, hand waving are NOT manifestations of generalized motor seizures.)  [] Seizure, fall and aspiration precautions. Avoid sleep deprivation. Advise pt not to drive, operate heavy machinery, avoid heights, pools, bathtubs, locked doors.     Recommend non-pharmacological interventions to prevent/treat delirium  [] maintain day/night light cycles  [] optimize sleep-wake cycle, minimize environmental noise  [] reorientation frequently  [] use verbal redirection as first line  [] minimize restraints and lines  [] ensure good bladder/bowel function  [] ensure adequate pain control  [] minimize use of anticholinergic, antihistaminic, and benzodiazepine medications.    Case discussed with telestroke attending Dr. Tadeo. To be seen by attending Dr. Jones in AM.

## 2024-09-29 NOTE — H&P ADULT - PROBLEM SELECTOR PLAN 4
chronic  - levothyroxine 75mcg daily  - tsh noted to be in the 8s on admission, will monitor for now and avoid adjusting the medication ISO of this acute encephalopathy/illness

## 2024-09-29 NOTE — H&P ADULT - HISTORY OF PRESENT ILLNESS
This is a 72 y/o F with PMH of recurrent major depressive disorder, htn, niddmt2, hld, parkinson's disease (not on active meds), hypothyroidism, hx of seizure disorder per chart review who presented from [facility] overnight after noted to have unwitnessed fall around 0028 hours, lying on L side, disoriented, slurring her words, episode of urinary incontinence without recollection of preceding events. Currently admitted to [facility] from May for recurrent MDD.     Pt seen at examined at bedside on 4s, EEG on going and on tele. reports being AAOx3 (name, place, year) and knows she is here because of seizure episodes. reports no pain of any kind, cp, sob, abd pain. does answer questions appropriately at times but also shows periods of confusion and delusions (which is noted on psych notes as well). was following commands, alert, and was telling about his past life and prior seizure admission at [facility] long time ago as well.     In the ED, vitals stable. stroke code called given presenting sx with CTA, CT brain perfusion, and CTH negative for acute pathology.EKG reviewed. EEG placed. On RA, VSS. Labs notable for: BG in 80s on arrival -> 53 -> 101 on recheck (possible error on low read). TSH elevated in 8s, AST 36, WBC wnl. IV valproic acid given in the ED and admitted to medicine for further workup and management.

## 2024-09-29 NOTE — H&P ADULT - NSHPLABSRESULTS_GEN_ALL_CORE
Chart(s)
LABS:                         12.5   4.99  )-----------( 160      ( 29 Sep 2024 01:59 )             38.5     09-29    143  |  107  |  26[H]  ----------------------------<  81  3.7   |  23  |  1.20    Ca    9.5      29 Sep 2024 01:59    TPro  7.1  /  Alb  3.7  /  TBili  <0.2  /  DBili  x   /  AST  36[H]  /  ALT  24  /  AlkPhos  83  09-29    PT/INR - ( 29 Sep 2024 01:59 )   PT: 14.4 sec;   INR: 1.24 ratio         PTT - ( 29 Sep 2024 01:59 )  PTT:36.7 sec  Urinalysis Basic - ( 29 Sep 2024 01:59 )    Color: x / Appearance: x / SG: x / pH: x  Gluc: 81 mg/dL / Ketone: x  / Bili: x / Urobili: x   Blood: x / Protein: x / Nitrite: x   Leuk Esterase: x / RBC: x / WBC x   Sq Epi: x / Non Sq Epi: x / Bacteria: x      < from: Xray Chest 1 View AP/PA (09.29.24 @ 03:41) >    PROCEDURE DATE:  09/29/2024          INTERPRETATION:  EXAMINATION: XR CHEST    CLINICAL INDICATION: unresponisve    TECHNIQUE: Single frontal, portable view of the chest was obtained.    COMPARISON: Chest x-ray 4/14/23.    FINDINGS:  Overlying EKG leads and wires.  The heart is normal in size.  Bilateral interstitial prominence may be secondary to vascular congestive   changes.  There is no pneumothorax or pleural effusion.  No acute bony abnormality.    IMPRESSION:  Bilateral interstitial prominence may be secondary to vascular congestive   changes.    --- End of Report ---    < end of copied text >    < from: CT Angio Brain Stroke Protocol  w/ IV Cont (09.29.24 @ 02:24) >      IMPRESSION:    CT PERFUSION:  Technical limitations: None.    Core infarction: 0 ml  Penumbra / tissue at risk for active ischemia: 0 ml    CTA NECK:  No evidence of significant stenosis or occlusion.    CTA HEAD:  No large vessel occlusion, significant stenosis or vascular abnormality   identified.        --- End of Report ---    < end of copied text >                  RADIOLOGY, EKG & ADDITIONAL TESTS: Reviewed.

## 2024-09-29 NOTE — H&P ADULT - NSHPPHYSICALEXAM_GEN_ALL_CORE
PHYSICAL EXAM    GEN: no distress, comfortable, EEG leads in place  Neuro: AAOx3 (name, place, date), periods of delusions, orientable, calm, cooperative, able to follow commands and express herself. CN II-XII intact. R pupil dilated, L reactive to light  PULM: BS heard b/l equal, No wheezing  CVS: S1S2 present, no rubs or gallops  ABD: Soft, non-distended, no guarding; non-tender  EXT: pin rolling tremors on extremities, no lower extremity edema. at least 4/5 strength in UE and LE, intact sensation.

## 2024-09-29 NOTE — OCCUPATIONAL THERAPY INITIAL EVALUATION ADULT - LEVEL OF INDEPENDENCE: SIT/STAND, REHAB EVAL
Consult d/t pt with RN documented wound. Chart reviewed. Wound does not appear at a level requiring nutrition intervention at this time per Nutrition Assessment Guidelines. Post operative knee infection noted. Will follow per protocol; RD available via consult as needed.      unable to perform

## 2024-09-29 NOTE — PHYSICAL THERAPY INITIAL EVALUATION ADULT - PERTINENT HX OF CURRENT PROBLEM, REHAB EVAL
73 year old female [facility] resident [facility] HTN, T2DM, HLD, parkinson's, hypothyroid, seizure disorder, major depressive disorder was brought to the ED as a code stroke for AMS, unilateral dilated pupil after an unwitnessed fall around 0015 9/29. At around 0030, she was found on the floor, turned to left, in pool of urine, very confused and unable to communicate afterwards. On arrival at ED, she was very drowsy, eye opening to noxious stimuli, withdrawing to pain in all 4 limbs, notably right pupil dilated and nonreactive. She was able to recall the fall when she mentioned she was changing clothes and fell down, maybe tripped, maybe hit her head.  CT Head: No acute intracranial hemorrhage, mass effect, or midline shift. No   intracranial pathology. No change in appearance of the calvarium compared   with the prior. Questionable subtle soft tissue swelling extracalvarial   in the right supraorbital region

## 2024-09-29 NOTE — SWALLOW BEDSIDE ASSESSMENT ADULT - SWALLOW EVAL: DIAGNOSIS
1. Mild-moderate oral stage for thin liquids and soft and bite-size as characterized by adequate oral acceptance, reduced oral containment for thin liquids with trace anterior loss via cup sips (suspect exacerbated by reduced labial seal), slow/prolonged mastication/gumming of the solid for soft and bite-size, adequate anterior-posterior transport, and reduced oral clearance with trace lingual surface stasis post primary-swallow for soft and bite-size; Cleared with liquid wash with thin liquids. 2. Functional oral stage for puree, moderately-thick, & mildly-thick liquids as characterized by adequate oral acceptance/containment, adequate anterior-posterior transport, and adequate oral clearance (continued in prognosis).

## 2024-09-29 NOTE — ED PROVIDER NOTE - PHYSICAL EXAMINATION
Gen: wakes to noxious stimuli  HEENT: L pupil reactive to light, R pupil dilated and unresponsive  CV: rrr, no appreciable murmur  Pulm: clear lungs  Abd: soft, ntnd  Ext: no edema/swelling  Neuro: withdraws left arm when attempting IV though not with attempts to right arm, otherwise not participating in full neuro exam  Skin: no rash/petechiae/ecchymosis

## 2024-09-29 NOTE — H&P ADULT - PROBLEM SELECTOR PLAN 2
- admission at [facility] since May for it  - on seroquel 50mg qhs and 50mg daily per last meds from chart transfer note  - c/w psych meds  - delirium precautions  - was on fluphanzine prn for agitation there, will monitor for now and tx symptomatically for agitation based on team assessment  - c/w melatonin qhs

## 2024-09-29 NOTE — CONSULT NOTE ADULT - SUBJECTIVE AND OBJECTIVE BOX
**STROKE CODE CONSULT NOTE**    Symptoms:***    Last known well time/Time of onset of symptoms:***    preMRS:***    NIHSS:***    CT/CTAP findings***    TNK***    Thrombectomy***    -------------------------------------------------------------------------------------------------------------------------------------------------------------------------------------------------------------------------    HPI:    73F admitted to [facility] for recurrent major depressive disorder w/ PMHx HTN, T2DM, HLD, parkinson's, hypothyroid, seizure disorder p/w unwitnessed fall ~ 00:28. As per staff, patient was found on the floor in her room during checks on her L side, awake but disoriented and slurring her words. Pt also noted to have had 1 episode of urinary incontinence when found. Patient unable to recall events leading up to the fall - unable to communicate if there was any LOC or head trauma and continues to fall asleep during interview. VSS. No visible injuries or deformities. Will transfer to [facility] ED for CTH and further evaluation.     PAST MEDICAL & SURGICAL HISTORY:  Parkinson disease      Seizure disorder      Hypothyroidism      Type II diabetes mellitus      Hyperlipidemia      Schizophrenia      Hemorrhoids      Hypertension      No significant past surgical history          FAMILY HISTORY:  No pertinent family history in first degree relatives        SOCIAL HISTORY:  Smoking Cessation:    MEDICATIONS  (STANDING):    MEDICATIONS  (PRN):      Allergies    [place] (Unknown)  penicillin (Hives; Rash)  lisinopril (Angioedema)    Intolerances        --------------------------------------------------------------------------------------------------------------------------------------------------------------------------------------------------------------------    Vital Signs Last 24 Hrs  T(C): 36.1 (29 Sep 2024 01:22), Max: 36.1 (29 Sep 2024 01:22)  T(F): 97 (29 Sep 2024 01:22), Max: 97 (29 Sep 2024 01:22)  HR: 73 (29 Sep 2024 01:22) (73 - 73)  BP: 121/78 (29 Sep 2024 01:22) (121/78 - 121/78)  BP(mean): --  RR: 15 (29 Sep 2024 01:22) (15 - 15)  SpO2: 98% (29 Sep 2024 01:22) (98% - 98%)    Parameters below as of 29 Sep 2024 01:22  Patient On (Oxygen Delivery Method): room air        Fingerstick Blood Glucose: CAPILLARY BLOOD GLUCOSE      POCT Blood Glucose.: 88 mg/dL (29 Sep 2024 01:26)      PHYSICAL EXAM:   INCOMPLETE    General - NAD  Cardiovascular - Peripheral pulses palpable, no edema    NEURO:    Mental status - Awake, Alert, Oriented to person, place, and time. Speech fluent, repetition and naming intact. Follows simple and complex commands. Attention/concentration, and fund of knowledge intact.    Cranial nerves -   PERRL, VFF, EOMI,   face sensation (V1-V3) intact b/l,   facial strength intact without asymmetry b/l,   hearing intact b/l,   palate with symmetric elevation,   trapezius 5/5 strength b/l,   tongue midline on protrusion with full lateral movement    Motor - Normal bulk and tone throughout. No pronator drift.  Strength testing            Deltoid      Biceps      Triceps     Wrist Extension    Wrist Flexion     Interossei         R            5                 5               5                     5                              5                        5                 5  L             5                 5               5                     5                              5                        5                 5              Hip Flexion    Hip Extension    Knee Flexion    Knee Extension    Dorsiflexion    Plantar Flexion  R              5                           5                       5                           5                            5                          5  L              5                           5                        5                           5                            5                          5    Sensation - Light touch AND/OR vibration intact throughout    DTR's -             Biceps      Triceps     Brachioradialis      Patellar    Ankle    Toes/plantar response  R             2+             2+                  2+                       2+            2+                 Down  L              2+             2+                 2+                        2+           2+                 Down    Coordination - Finger to Nose intact b/l. Heel to Cabez[place] intact b/l. No tremors appreciated    Gait and station - Normal casual gait. Romberg (-)    ---------------------------------------------------------------------------------------------------------------------------------------------------------------------------------------------------------------------------    Labs:    09-27    141  |  105  |  22  ----------------------------<  94  4.8   |  23  |  0.90    Ca    9.5      27 Sep 2024 14:01  Phos  3.4     09-27  Mg     1.70     09-27      CAPILLARY BLOOD GLUCOSE      POCT Blood Glucose.: 88 mg/dL (29 Sep 2024 01:26)          CSF:                  Radiology:   **STROKE CODE CONSULT NOTE**    Symptoms:***    Last known well time/Time of onset of symptoms:***    preMRS:***    NIHSS:***    CT/CTAP findings***    TNK***    Thrombectomy***    -------------------------------------------------------------------------------------------------------------------------------------------------------------------------------------------------------------------------    HPI:    73F admitted to [facility] for recurrent major depressive disorder w/ PMHx HTN, T2DM, HLD, parkinson's, hypothyroid, seizure disorder p/w unwitnessed fall ~ 00:28. As per staff, patient was found on the floor in her room during checks on her L side, awake but disoriented and slurring her words. Pt also noted to have had 1 episode of urinary incontinence when found. Patient unable to recall events leading up to the fall - unable to communicate if there was any LOC or head trauma and continues to fall asleep during interview. VSS. No visible injuries or deformities. Will transfer to [facility] ED for CTH and further evaluation.     agitated 2200  ativan 2200  normal 0000  unwitnessed fall, pool of urine 0030  right pupil enlarged  post ictal   no focal deficit  waking up  repeat exam-   CT no bleed      PAST MEDICAL & SURGICAL HISTORY:  Parkinson disease      Seizure disorder      Hypothyroidism      Type II diabetes mellitus      Hyperlipidemia      Schizophrenia      Hemorrhoids      Hypertension      No significant past surgical history          FAMILY HISTORY:  No pertinent family history in first degree relatives        SOCIAL HISTORY:  Smoking Cessation:    MEDICATIONS  (STANDING):    MEDICATIONS  (PRN):      Allergies    [place] (Unknown)  penicillin (Hives; Rash)  lisinopril (Angioedema)    Intolerances        --------------------------------------------------------------------------------------------------------------------------------------------------------------------------------------------------------------------    Vital Signs Last 24 Hrs  T(C): 36.1 (29 Sep 2024 01:22), Max: 36.1 (29 Sep 2024 01:22)  T(F): 97 (29 Sep 2024 01:22), Max: 97 (29 Sep 2024 01:22)  HR: 73 (29 Sep 2024 01:22) (73 - 73)  BP: 121/78 (29 Sep 2024 01:22) (121/78 - 121/78)  BP(mean): --  RR: 15 (29 Sep 2024 01:22) (15 - 15)  SpO2: 98% (29 Sep 2024 01:22) (98% - 98%)    Parameters below as of 29 Sep 2024 01:22  Patient On (Oxygen Delivery Method): room air        Fingerstick Blood Glucose: CAPILLARY BLOOD GLUCOSE      POCT Blood Glucose.: 88 mg/dL (29 Sep 2024 01:26)      PHYSICAL EXAM:   INCOMPLETE    General - NAD  Cardiovascular - Peripheral pulses palpable, no edema    NEURO:    Mental status - Awake, Alert, Oriented to person, place, and time. Speech fluent, repetition and naming intact. Follows simple and complex commands. Attention/concentration, and fund of knowledge intact.    Cranial nerves -   PERRL, VFF, EOMI,   face sensation (V1-V3) intact b/l,   facial strength intact without asymmetry b/l,   hearing intact b/l,   palate with symmetric elevation,   trapezius 5/5 strength b/l,   tongue midline on protrusion with full lateral movement    Motor - Normal bulk and tone throughout. No pronator drift.  Strength testing            Deltoid      Biceps      Triceps     Wrist Extension    Wrist Flexion     Interossei         R            5                 5               5                     5                              5                        5                 5  L             5                 5               5                     5                              5                        5                 5              Hip Flexion    Hip Extension    Knee Flexion    Knee Extension    Dorsiflexion    Plantar Flexion  R              5                           5                       5                           5                            5                          5  L              5                           5                        5                           5                            5                          5    Sensation - Light touch AND/OR vibration intact throughout    DTR's -             Biceps      Triceps     Brachioradialis      Patellar    Ankle    Toes/plantar response  R             2+             2+                  2+                       2+            2+                 Down  L              2+             2+                 2+                        2+           2+                 Down    Coordination - Finger to Nose intact b/l. Heel to Cabezas intact b/l. No tremors appreciated    Gait and station - Normal casual gait. Romberg (-)    ---------------------------------------------------------------------------------------------------------------------------------------------------------------------------------------------------------------------------    Labs:    09-27    141  |  105  |  22  ----------------------------<  94  4.8   |  23  |  0.90    Ca    9.5      27 Sep 2024 14:01  Phos  3.4     09-27  Mg     1.70     09-27      CAPILLARY BLOOD GLUCOSE      POCT Blood Glucose.: 88 mg/dL (29 Sep 2024 01:26)          CSF:                  Radiology:   **STROKE CODE CONSULT NOTE**    Symptoms: AMS    Last known well time/Time of onset of symptoms: 0001 9/29    preMRS: 2-3    NIHSS: initial around 0150: 24 (unresponsive), later around 0220: 4    CT/CTAP findings: prelim: unremarkable    TNK not a candidate due to no ischemic deficit, likely post ictal    Thrombectomy not a candidate due to no LVO    -------------------------------------------------------------------------------------------------------------------------------------------------------------------------------------------------------------------------    HPI:    74yo F, [facility] HTN, T2DM, HLD, parkinson's, hypothyroid, seizure disorder, major depressive disorder was brought to the ED as a code stroke for AMS, unilateral dilated pupil after an unwitnessed fall around 0015 9/29. She was in her normal state i.e. able to communicate, ambulate with assistance around 2200, was bit agitated after that, received ativan 1mg, seroquel 1mg around 2200. Last check was around 0001, when she was at baseline. At around 0030, she was found on the floor, turned to left, in pool of urine, very confused and unable to communicate afterwards. On arrival at ED, she was very drowsy, eye opening to noxious stimuli, withdrawing to pain in all 4 limbs, notably right pupil dilated and nonreactive. VSS. After the CT scan and while in the room, she was waking up a bit, but still drowsy(this is not baseline), eye opening to voice, able to say name, identify people and objects, not oriented to place, oriented to time, following commands, able to hold all 4 limbs against gravity, mild weakness on RLE, pain limited. She was able to recall the fall when she mentioned she was changing clothes and fell down, maybe tripped, maybe hit her head. Unclear seizure hx.       PAST MEDICAL & SURGICAL HISTORY:  Parkinson disease      Seizure disorder      Hypothyroidism      Type II diabetes mellitus      Hyperlipidemia      Schizophrenia      Hemorrhoids      Hypertension      No significant past surgical history          FAMILY HISTORY:  No pertinent family history in first degree relatives        SOCIAL HISTORY:  Smoking Cessation:    MEDICATIONS  (STANDING):    MEDICATIONS  (PRN):      Allergies    [place] (Unknown)  penicillin (Hives; Rash)  lisinopril (Angioedema)    Intolerances        --------------------------------------------------------------------------------------------------------------------------------------------------------------------------------------------------------------------    Vital Signs Last 24 Hrs  T(C): 36.1 (29 Sep 2024 01:22), Max: 36.1 (29 Sep 2024 01:22)  T(F): 97 (29 Sep 2024 01:22), Max: 97 (29 Sep 2024 01:22)  HR: 73 (29 Sep 2024 01:22) (73 - 73)  BP: 121/78 (29 Sep 2024 01:22) (121/78 - 121/78)  BP(mean): --  RR: 15 (29 Sep 2024 01:22) (15 - 15)  SpO2: 98% (29 Sep 2024 01:22) (98% - 98%)    Parameters below as of 29 Sep 2024 01:22  Patient On (Oxygen Delivery Method): room air        Fingerstick Blood Glucose: CAPILLARY BLOOD GLUCOSE      POCT Blood Glucose.: 88 mg/dL (29 Sep 2024 01:26)      PHYSICAL EXAM:     General - drowsy  Cardiovascular - Peripheral pulses palpable, no edema    NEURO:    Mental status(repeat exam 0220) - drowsy, eye opening to voice, oriented to person, time, not to place, follows simple commands, name objects, speech is baseline as per attendant, but drowsiness is not baseline.     Cranial nerves -   right pupil dilated, non reactive, blink to threat b/l present, EOMI,   face sensation (V1-V3) intact b/l,   facial strength intact without asymmetry b/l,   hearing intact b/l,   palate with symmetric elevation,   tongue midline on protrusion with full lateral movement    Motor - normal bulk, normal tone    Strength testing            Deltoid      Biceps      Triceps     Wrist Extension    Wrist Flexion     Interossei         R            4                 4               4                     4                              4                        4                 4  L             4                 4               4                     4                              4                        4                 4              Hip Flexion    Hip Extension    Knee Flexion    Knee Extension    Dorsiflexion    Plantar Flexion  R              4                           5                       5                           5                            5                          5  L              5                           5                        5                           5                            5                          5    Sensation - Light touch intact throughout    DTR's -             Biceps      Triceps     Brachioradialis      Patellar    Ankle    Toes/plantar response  R             2+             2+                  2+                       2+            2+                 Down  L              2+             2+                 2+                        2+           2+                 Down    Coordination - resting tremors b/l UE, unable to assess coordination due to non compliance    Gait and station - deferred due to pt safety    ---------------------------------------------------------------------------------------------------------------------------------------------------------------------------------------------------------------------------    Labs:    09-27    141  |  105  |  22  ----------------------------<  94  4.8   |  23  |  0.90    Ca    9.5      27 Sep 2024 14:01  Phos  3.4     09-27  Mg     1.70     09-27      CAPILLARY BLOOD GLUCOSE      POCT Blood Glucose.: 88 mg/dL (29 Sep 2024 01:26)          CSF:                  Radiology:

## 2024-09-29 NOTE — SWALLOW BEDSIDE ASSESSMENT ADULT - SWALLOW EVAL: PROGNOSIS
(continued) 3. Functional pharyngeal stage for soft and bite-size, puree, moderately-thick, mildly-thick, and thin liquids as characterized by suspected timely initiation of the pharyngeal swallow trigger and +hyolaryngeal excursion upon digital palpation. No overt s/sx aspiration/penetration evidenced post oral-intake PO trials  soft and bite-size, puree, moderately-thick, mildly-thick, and thin liquids.

## 2024-09-29 NOTE — OCCUPATIONAL THERAPY INITIAL EVALUATION ADULT - DIAGNOSIS, OT EVAL
Pt with impaired cognition, decreased endurance, and generalized weakness impacting ability to complete ADLs, IADLs, functional mobility/transfers.

## 2024-09-29 NOTE — SWALLOW BEDSIDE ASSESSMENT ADULT - ORAL PHASE
Within functional limits Trace lingual surface stasis post primary-swallow; Cleared with liquid wash with thin liquids./Lingual stasis

## 2024-09-29 NOTE — PATIENT PROFILE ADULT - FALL HARM RISK - HARM RISK INTERVENTIONS
Assistance with ambulation/Assistance OOB with selected safe patient handling equipment/Communicate Risk of Fall with Harm to all staff/Discuss with provider need for PT consult/Monitor gait and stability/Provide patient with walking aids - walker, cane, crutches/Reinforce activity limits and safety measures with patient and family/Tailored Fall Risk Interventions/Use of alarms - bed, chair and/or voice tab/Visual Cue: Yellow wristband and red socks/Bed in lowest position, wheels locked, appropriate side rails in place/Call bell, personal items and telephone in reach/Instruct patient to call for assistance before getting out of bed or chair/Non-slip footwear when patient is out of bed/[place] to call system/Physically safe environment - no spills, clutter or unnecessary equipment/Purposeful Proactive Rounding/Room/bathroom lighting operational, light cord in reach Assistance OOB with selected safe patient handling equipment/Communicate Risk of Fall with Harm to all staff/Discuss with provider need for PT consult/Monitor gait and stability/Provide patient with walking aids - walker, cane, crutches/Reinforce activity limits and safety measures with patient and family/Tailored Fall Risk Interventions/Use of alarms - bed, chair and/or voice tab/Visual Cue: Yellow wristband and red socks/Bed in lowest position, wheels locked, appropriate side rails in place/Call bell, personal items and telephone in reach/Instruct patient to call for assistance before getting out of bed or chair/Non-slip footwear when patient is out of bed/[place] to call system/Physically safe environment - no spills, clutter or unnecessary equipment/Purposeful Proactive Rounding/Room/bathroom lighting operational, light cord in reach

## 2024-09-29 NOTE — PHYSICAL THERAPY INITIAL EVALUATION ADULT - FOLLOWS COMMANDS/ANSWERS QUESTIONS, REHAB EVAL
Inconsistent command follow; frequent redirection provided/75% of the time/50% of the time/able to follow single-step instructions

## 2024-09-29 NOTE — H&P ADULT - PROBLEM SELECTOR PLAN 7
- DVT ppx: lovenox  - Diet: minced and moist  - Full code    called family with phone number in chart for updates but unable to reach either number. CM support appreciated.

## 2024-09-29 NOTE — SWALLOW BEDSIDE ASSESSMENT ADULT - H & P REVIEW
Neuro Consult 9/29: "Assessment: 72yo F, [facility] resident [facility] HTN, T2DM, HLD, parkinson's, hypothyroid, seizure disorder, major depressive disorder was brought to the ED as a code stroke for AMS, unilateral dilated pupil after an unwitnessed fall around 0030 9/29. She was in her normal state i.e. able to communicate, ambulate with assistance around 2200, was bit agitated after that, received ativan 1mg, seroquel 1mg around 2200. Last check was around 0001, when she was at baseline. At around 0030, she was found on the floor, turned to left, in pool of urine, very confused and unable to communicate afterwards. On arrival at ED, she was very drowsy, eye opening to noxious stimuli, withdrawing to pain in all 4 limbs, notably right pupil dilated and nonreactive. VSS. After the CT scan and while in the room, she was waking up a bit, but still drowsy(this is not baseline), eye opening to voice, able to say name, identify people and objects, not oriented to place, oriented to time, following commands, able to hold all 4 limbs against gravity, mild weakness on RLE, pain limited. She was able to recall the fall when she mentioned she was changing clothes and fell down, maybe tripped, maybe hit her head. Unclear seizure hx, on divalproex [id number]. VSS. CTH/CTA unremarkable. Notably anisocoria was noted in 01/2024; Impression: Altered mentation, now improving could be post concussive(head strike) vs post ictal(urinary incontenence, seizure hx, post ictal confusion) vs medication related (ativan, seroquel). Unilateral dilated pupil(R) could be from possible medication (albuterol prn), could be pathological (noted in 01/2024)."

## 2024-09-29 NOTE — PHYSICAL THERAPY INITIAL EVALUATION ADULT - ADDITIONAL COMMENTS
Patient is poor historian. Per documentation, patient is from [facility]. She reports she was ambulating independently without a device and was independent with ADLs prior to admission. Per documentation, she ambulates with assistance. Please follow up with care coordination note/social work for further information.    Patient left supine in bed in NAD, all lines intact, +alarm, call bell in reach. EEG staff at bedside.

## 2024-09-29 NOTE — ED ADULT NURSE NOTE - OBJECTIVE STATEMENT
RUBY RN: Pt received as code stroke, arrives to ED from [facility] with staff present. Pt Hx. Schizophrenia. DM2. HLD. Parkinsons. As per [facility] staff pt LNK 10pm, found at midnight on the floor on her left side, noted to be difficult to arouse and incontinent of urine. Per staff pt baseline A&Ox3-4, was acting as usual self around 10pm walking around without assistive devices at baseline. Pt was noted to be agitated around 10pm, was medicated with 1Mg ativan, 2mg prolixin, 50mg seroquel and her nighttime metformin and melatonin. Upon arrival to ED, pt drowsy, difficult to arrouse. Pt noted to have minimal reaction to verbal stimuli, withdrawing from pain. Pt also noted to have dilated pupil to right eye and non-reactive to light. Pt unable to provide history at time. 20G US IV established to right ac, labs collected. Pt taken to CT scan, to be taken to rm 8. safety maintained, report given to primary rn (02:00) RUBY RN: Pt received as code stroke, arrives to ED from [facility] with staff present. Pt Hx. Schizophrenia. DM2. HLD. Parkinsons. As per [facility] staff pt LNK 10pm, found at midnight on the floor on her left side, noted to be difficult to arouse and incontinent of urine. Per staff pt baseline A&Ox3-4, was acting as usual self around 10pm walking around without assistive devices at baseline. Pt was noted to be agitated around 10pm, was medicated with 1Mg ativan, 2mg prolixin, 50mg seroquel and her nighttime metformin and melatonin. Upon arrival to ED, pt drowsy, difficult to arrouse. Pt noted to have minimal reaction to verbal stimuli, withdrawing from pain. Pt also noted to have dilated pupil to right eye and non-reactive to light. Pt unable to provide history at time. 20G US IV established to right ac, labs collected. Pt taken to CT scan, to be taken to rm 8. safety maintained, report given to primary rn

## 2024-09-29 NOTE — H&P ADULT - PROBLEM SELECTOR PLAN 1
- new   - events leading to transfer from [facility] noted and stated as above, now improving back to aaox3 and following commands  - sp stroke code with cta, cth, and ct perfusion neg for acute pathology  - neuro consulted; recs appreciated  - EEG ongoing  - check MRI brain   - toxic metabolic encepaholopathy w/u- f/u ua, covid/flu pcr, vitamin b1,6,12, vitamin d, e, tsh, ammonia, lactate, ck, mma, syphilis, lyme, utox  - q4h neuro check  - monitor on tele   - check echo  - delirium precautions  - c/w valproic acid sprinkles 750mg BID for now  - SLP eval noted, esophagram ordered to rule out silent aspiration     #dilated pupil  -baseline anisocoria (OD 8mm, OS 1mm) per chart review    #interstitial infiltrates  - CXR noted for b/l interstitial prominence 2/2 to vascular congestive changes  - afebrile, no wbc count, monitor for now off abx therapy pending further encephalopathy workup

## 2024-09-29 NOTE — ED ADULT NURSE NOTE - SUICIDE SCREENING QUESTION 2
Dixie Buchanan  Call main office 1/14/2021 stating that someone call her and told her to call  Main office , patient is confused , patient will be waiting for a phone call .       Thank you   Bhavana Devi
Tc to the pt 2x. No answer. A message was left to call the [facility] office. That the nurse was returning her call.  Dimitri Bush RN
Patient unable to complete

## 2024-09-29 NOTE — SWALLOW BEDSIDE ASSESSMENT ADULT - COMMENTS
CXR 9/29: "IMPRESSION: Bilateral interstitial prominence may be secondary to vascular congestive changes."    CT Brain 9/29: "IMPRESSION: Mildly motion degraded exam. No gross acute hemorrhage, vasogenic edema or extra-axial collection. Stable chronic findings as above."    Of Note: Patient known to this service from past admission. Most recent Clinical Swallow Evaluation completed 5/18/24 with recommendations of: "Easy To Chew/Thin; Objective testing not warranted as patient with no overt signs of aspiration at bedside" (see note for details). CXR 9/29: "IMPRESSION: Bilateral interstitial prominence may be secondary to vascular congestive changes."    CT Brain 9/29: "IMPRESSION: Mildly motion degraded exam. No gross acute hemorrhage, vasogenic edema or extra-axial collection. Stable chronic findings as above."    Of Note: Patient known to this service from past admission. Most recent Clinical Swallow Evaluation completed 5/18/24 with recommendations of: "Easy To Chew/Thin; Objective testing not warranted as patient with no overt signs of aspiration at bedside" (see note for details).    Patient received resting in bed, on room air, and in no acute distress. Roused to alert state given verbal cues. Able to make wants/needs known and follow simple verbal directives. Endorsed past medical history of Parkinson's Disease. Stated she has to, "sit up," when she eats/drinks. Agreeable to PO trials with SLP.

## 2024-09-29 NOTE — ED PROVIDER NOTE - OBJECTIVE STATEMENT
73F h/o dementia, seizure d/o, depression, HTN, HLD found down at [facility]. PT last seen normal at 10p, found on floor in urine puddle ~12am. Pt had been given ativan earlier due to agitation but otherwise no new medications. Not on blood thinners. Pt initally more responsive when EMS arrived though now only responsive to noxious stimuli. R pupil noted to be dilated and minimally reactive, taken directly to CT from triage.

## 2024-09-29 NOTE — OCCUPATIONAL THERAPY INITIAL EVALUATION ADULT - PERTINENT HX OF CURRENT PROBLEM, REHAB EVAL
As per H&P: "72 yo F, [facility] resident [facility] HTN, T2DM, HLD, parkinson's, hypothyroid, seizure disorder, major depressive disorder was brought to the ED as a code stroke for AMS, unilateral dilated pupil after an unwitnessed fall around 0015 9/29. She was in her normal state i.e. able to communicate, ambulate with assistance around 2200, was bit agitated after that, received ativan 1mg, seroquel 1mg around 2200. Last check was around 0001, when she was at baseline. At around 0030, she was found on the floor, turned to left, in pool of urine, very confused and unable to communicate afterwards."  Admit dx: Altered mental status, code stroke    CT Head: "Penumbra / tissue at risk for active ischemia: 0 ml"  X-Ray Chest: "Bilateral interstitial prominence may be secondary to vascular congestive changes."

## 2024-09-29 NOTE — PHYSICAL THERAPY INITIAL EVALUATION ADULT - LEVEL OF INDEPENDENCE: SUPINE/SIT, REHAB EVAL
Unable to assess, patient not following complex commands at this time, very lethargic and distracted.

## 2024-09-29 NOTE — STROKE CODE NOTE - NSMDCONSULT QTN_Y FT
Information obtained from Neurology resident.   patient with sudden syncope followed by presentation with decreased mental status - and quadraplegia as well as no withdrawal to pain in all four extremities. Opens eyes to painful stimulation and has intact brainstem signs. Normal BP. CT head negative. CTP shows bilateral perfusion deficit  CTA negative for LVO.   CTA of the neck shows no evidence of spine fracture or bleed or significant effacement of cord.     Doubt stroke as etiology as patient would not have quadraplegia with encephalopathy without significant LVO or bleed and her normal BP argues against ischemia as well. Will not proceed with TNK in this patient.    recommend MRI Brain.     Patient subsequently received urgent MR - no evidence of ischemia.   MR C spine is also normal.   Will proceed with VEEG to r/o Sz.   Psych consult for functional disorder.

## 2024-09-29 NOTE — ED ADULT NURSE REASSESSMENT NOTE - NS ED NURSE REASSESS COMMENT FT1
Upon return from CT scan, pt changed into gown and placed on cardiac monitor. skin intact, no obvious deformity noted. Pt more alert at this time, able to provide minimal history. Pt states she was attempting to change her clothes when she fell, unable to remember if she hit her head but endorsing mild back pain. Pt noted to respond to speech, withdrawing from painful stimuli bilaterally. Pt A&Ox1-2. Pt noted to have some weakness bilaterally but denies pain to extremities. VS as noted. respirations even and unlabored. safety maintained throughout. (02:30) Upon return from CT scan, pt changed into gown and placed on cardiac monitor. skin intact, no obvious deformity noted. Pt more alert at this time, able to provide minimal history. Pt states she was attempting to change her clothes when she fell, unable to remember if she hit her head but endorsing mild back pain. Pt noted to respond to speech, withdrawing from painful stimuli bilaterally. Pt A&Ox1-2. Pt noted to have some weakness bilaterally but denies pain to extremities. VS as noted. respirations even and unlabored. safety maintained throughout.

## 2024-09-29 NOTE — H&P ADULT - TIME BILLING
Preparing to see the patient including review of tests and other providers' notes, confirming history with family members, performing medical examination and evaluation, counseling and educating the patient/family/caregiver, ordering medications, tests and procedures, communicating with other health care professionals, documenting clinical information in the EMR, independently interpreting results and communicating results to the patient/family/caregiven, care coordination.

## 2024-09-29 NOTE — SWALLOW BEDSIDE ASSESSMENT ADULT - ADDITIONAL RECOMMENDATIONS
Medical team advised to reconsult this service if there is change in patient's medical status or observed tolerance of recommended PO diet. This service to follow to assess/monitor diet tolerance as schedule permits.

## 2024-09-29 NOTE — ED ADULT TRIAGE NOTE - CHIEF COMPLAINT QUOTE
pt coming in s/p unwitnessed fall, found on ground at 00:15 tonight. Last seen at 10pm after given ativan 1mg IM. Hx. Schizophrenia. DM2. HLD. Asthma. no blood thinner use. pt noted to have unreactive right dilated pupil. pt responsive to tactile stimuli. MD Sinclair called for eval. code stroke called.

## 2024-09-29 NOTE — CHART NOTE - NSCHARTNOTEFT_GEN_A_CORE
EEG preliminary read (not final) on the initial recording hour(s) = x 1.5 hr    No seizures were recorded.  Final report to follow tomorrow morning after completion of study.    [facility] EEG Reading Room Ph#: [phone]  Epilepsy Answering Service after 5PM and before 8:30AM: Ph#: [phone]

## 2024-09-29 NOTE — PHYSICAL THERAPY INITIAL EVALUATION ADULT - GENERAL OBSERVATIONS, REHAB EVAL
Patient received semi-supine in NAD, +tele, confused and lethargic, but agreeable to participate. HR 69 BPM.

## 2024-09-29 NOTE — CONSULT NOTE ADULT - ATTENDING COMMENTS
During my evaluation, I interviewed the patient, discussed the case with the Resident, and I agree with the findings and plan as documented in the Resident’s note, with the exception of the following:    Ms. Azul is a 73 year old right- handed woman with a PMH of Parkinson's disease, a possible seizure disorder, major depressive disorder ( on Depakote), and vascular risk factors she was brought to the [facility] ER due to the altered mental status and a unilateral dilated pupil after an unwitnessed fall.    Notably, chart review from previous admission indicates that patient has baseline anisocoria (OD 8mm OS 1mm).     The Etiology of the altered mental status remains uncertain but is likely due to  metabolic encephalopathy. Clinically,  vascular causes or seizure are considered less likely. The Patient will requires further evaluation to rule out treatable causes. Currently patient is back to baseline.     Recommendations:     MRI brain   EEG  Continue medical management, including neuro- check and fall precautions.  Implement GI and DVT prophylaxis.         I discussed the diagnosis and treatment plan with the patient, addressing all questions and concerns.     Time Spent:  My cumulative time spent on the care of this patient was 80 minutes.    If you have any further questions, please do not hesitate to contact our consult service.    Thank you for allowing us to participate in this patient’s care.

## 2024-09-29 NOTE — SWALLOW BEDSIDE ASSESSMENT ADULT - ASR SWALLOW REFERRAL
Recommend RD consultation to ensure adequate nutrition; Patient may benefit from PO supplements to maximize daily caloric intake./Registered Dietitian

## 2024-09-29 NOTE — SWALLOW BEDSIDE ASSESSMENT ADULT - ORAL PREPARATORY PHASE
Slow/prolonged mastication/gumming of the solid Within functional limits Trace anterior loss via cup sips (suspect exacerbated by reduced labial seal).

## 2024-09-29 NOTE — PATIENT PROFILE ADULT - OVER THE PAST TWO WEEKS HAVE YOU FELT DOWN, DEPRESSED OR HOPELESS?
Called surgery resident for consult regarding clinical concern for cholecystitis given constant pain and TTP on examination
no

## 2024-09-29 NOTE — ED ADULT NURSE NOTE - NSFALLRISKINTERV_ED_ALL_ED

## 2024-09-30 ENCOUNTER — RESULT REVIEW (OUTPATIENT)
Age: 74
End: 2024-09-30

## 2024-09-30 LAB
ANION GAP SERPL CALC-SCNC: 14 MMOL/L — SIGNIFICANT CHANGE UP (ref 7–14)
BUN SERPL-MCNC: 18 MG/DL — SIGNIFICANT CHANGE UP (ref 7–23)
CALCIUM SERPL-MCNC: 9 MG/DL — SIGNIFICANT CHANGE UP (ref 8.4–10.5)
CHLORIDE SERPL-SCNC: 103 MMOL/L — SIGNIFICANT CHANGE UP (ref 98–107)
CHOLEST SERPL-MCNC: 148 MG/DL — SIGNIFICANT CHANGE UP
CO2 SERPL-SCNC: 20 MMOL/L — LOW (ref 22–31)
CREAT SERPL-MCNC: 0.76 MG/DL — SIGNIFICANT CHANGE UP (ref 0.5–1.3)
CULTURE RESULTS: SIGNIFICANT CHANGE UP
EGFR: 84 ML/MIN/1.73M2 — SIGNIFICANT CHANGE UP
GLUCOSE SERPL-MCNC: 75 MG/DL — SIGNIFICANT CHANGE UP (ref 70–99)
HCT VFR BLD CALC: 41.1 % — SIGNIFICANT CHANGE UP (ref 34.5–45)
HDLC SERPL-MCNC: 56 MG/DL — SIGNIFICANT CHANGE UP
HGB BLD-MCNC: 12.9 G/DL — SIGNIFICANT CHANGE UP (ref 11.5–15.5)
LIPID PNL WITH DIRECT LDL SERPL: 64 MG/DL — SIGNIFICANT CHANGE UP
MAGNESIUM SERPL-MCNC: 1.7 MG/DL — SIGNIFICANT CHANGE UP (ref 1.6–2.6)
MCHC RBC-ENTMCNC: 26.6 PG — LOW (ref 27–34)
MCHC RBC-ENTMCNC: 31.4 GM/DL — LOW (ref 32–36)
MCV RBC AUTO: 84.7 FL — SIGNIFICANT CHANGE UP (ref 80–100)
NON HDL CHOLESTEROL: 92 MG/DL — SIGNIFICANT CHANGE UP
NRBC # BLD: 0 /100 WBCS — SIGNIFICANT CHANGE UP (ref 0–0)
NRBC # FLD: 0 K/UL — SIGNIFICANT CHANGE UP (ref 0–0)
PHOSPHATE SERPL-MCNC: 4.3 MG/DL — SIGNIFICANT CHANGE UP (ref 2.5–4.5)
PLATELET # BLD AUTO: 147 K/UL — LOW (ref 150–400)
POTASSIUM SERPL-MCNC: 4.8 MMOL/L — SIGNIFICANT CHANGE UP (ref 3.5–5.3)
POTASSIUM SERPL-SCNC: 4.8 MMOL/L — SIGNIFICANT CHANGE UP (ref 3.5–5.3)
RBC # BLD: 4.85 M/UL — SIGNIFICANT CHANGE UP (ref 3.8–5.2)
RBC # FLD: 15.5 % — HIGH (ref 10.3–14.5)
SODIUM SERPL-SCNC: 137 MMOL/L — SIGNIFICANT CHANGE UP (ref 135–145)
SPECIMEN SOURCE: SIGNIFICANT CHANGE UP
T4 FREE SERPL-MCNC: 1.2 NG/DL — SIGNIFICANT CHANGE UP (ref 0.9–1.8)
TRIGL SERPL-MCNC: 138 MG/DL — SIGNIFICANT CHANGE UP
WBC # BLD: 5.05 K/UL — SIGNIFICANT CHANGE UP (ref 3.8–10.5)
WBC # FLD AUTO: 5.05 K/UL — SIGNIFICANT CHANGE UP (ref 3.8–10.5)

## 2024-09-30 PROCEDURE — 90792 PSYCH DIAG EVAL W/MED SRVCS: CPT

## 2024-09-30 PROCEDURE — 99232 SBSQ HOSP IP/OBS MODERATE 35: CPT

## 2024-09-30 PROCEDURE — 74230 X-RAY XM SWLNG FUNCJ C+: CPT | Mod: 26

## 2024-09-30 PROCEDURE — 99233 SBSQ HOSP IP/OBS HIGH 50: CPT

## 2024-09-30 PROCEDURE — 93306 TTE W/DOPPLER COMPLETE: CPT | Mod: 26

## 2024-09-30 RX ADMIN — Medication 2 TABLET(S): at 22:11

## 2024-09-30 RX ADMIN — DIVALPROEX SODIUM 750 MILLIGRAM(S): 250 TABLET, FILM COATED, EXTENDED RELEASE ORAL at 18:06

## 2024-09-30 RX ADMIN — Medication 50 MILLIGRAM(S): at 08:46

## 2024-09-30 RX ADMIN — DIVALPROEX SODIUM 750 MILLIGRAM(S): 250 TABLET, FILM COATED, EXTENDED RELEASE ORAL at 08:47

## 2024-09-30 RX ADMIN — Medication 1 TABLET(S): at 11:07

## 2024-09-30 RX ADMIN — Medication 10 GRAM(S): at 11:08

## 2024-09-30 RX ADMIN — QUETIAPINE FUMARATE 50 MILLIGRAM(S): 200 TABLET ORAL at 11:07

## 2024-09-30 RX ADMIN — FLUTICASONE PROPIONATE 1 SPRAY(S): 50 SPRAY, METERED NASAL at 08:55

## 2024-09-30 RX ADMIN — Medication 1000 UNIT(S): at 11:07

## 2024-09-30 RX ADMIN — Medication 75 MICROGRAM(S): at 08:46

## 2024-09-30 RX ADMIN — Medication 6 MILLIGRAM(S): at 22:12

## 2024-09-30 RX ADMIN — QUETIAPINE FUMARATE 50 MILLIGRAM(S): 200 TABLET ORAL at 22:14

## 2024-09-30 RX ADMIN — Medication 40 MILLIGRAM(S): at 11:12

## 2024-09-30 NOTE — PROGRESS NOTE ADULT - PROBLEM SELECTOR PLAN 2
- admission at [facility] since May for it  - on seroquel 50mg qhs and 50mg daily per last meds from chart transfer note  - c/w psych meds  - delirium precautions  - was on fluphanzine prn for agitation there, will monitor for now and tx symptomatically for agitation based on team assessment  - c/w melatonin qhs

## 2024-09-30 NOTE — SWALLOW VFSS/MBS ASSESSMENT ADULT - ORAL PHASE
Premature spillage to the hypopharynx (vallecular) within functional limits Slow/Prolonged Mastication

## 2024-09-30 NOTE — PROGRESS NOTE ADULT - SUBJECTIVE AND OBJECTIVE BOX
Interval History: EEG on overnight.  No epileptiform activity noted.    Exam:    Vital Signs Last 24 Hrs  T(C): 36.6 (01 Oct 2024 08:00), Max: 36.8 (30 Sep 2024 15:55)  T(F): 97.9 (01 Oct 2024 08:00), Max: 98.2 (30 Sep 2024 15:55)  HR: 67 (01 Oct 2024 08:00) (65 - 89)  BP: 141/80 (01 Oct 2024 08:00) (128/76 - 155/88)  BP(mean): --  RR: 18 (01 Oct 2024 08:00) (18 - 18)  SpO2: 99% (01 Oct 2024 08:00) (97% - 100%)    Awake and alert, speech fluent and prosodic with no paraphasic errors, oriented x 3  R pupil non-reactive, tracks in all directions, face symmetric, no dysarthria, tongue midline  Continuous bilateral upper extremity tremor  Moves all 4 extremities symmetrically with good strength      EEG:  No epileptiform abnormalities  R > L upper extremity shaking noted on video, without epileptiform electrographic correlate

## 2024-09-30 NOTE — BH CONSULTATION LIAISON ASSESSMENT NOTE - HPI (INCLUDE ILLNESS QUALITY, SEVERITY, DURATION, TIMING, CONTEXT, MODIFYING FACTORS, ASSOCIATED SIGNS AND SYMPTOMS)
Ms Azul is a 72 yo F w history of SCZ, Parkinson's dz, hypothyroidism, and multiple psych hospitalizations who presents for AMS. Pt transferred from [facility], where she was found to have fallen and was covered in urine. Pt has been hospitalized in [facility] with occasional stays at [facility] for medical concerns since the start of this year for psychosis. Per [facility] notes, [facility] appears to [facility]. Pt has been delusional at [facility] with recent delusions surrounding pregnancy. Current medications are Depakote 750 mg bid, Seroquel 50 mg bid, and Prolixin Dec 12.5 mg IM q2w (last dose 9/24). Psychiatry was consulted for continued psychiatric care.    Pt seen at bedside. Pt states that she was changing her underwear for the bathroom when she fell and urinated over herself since she needed to use the bathroom. Complaining of having 2 uteruses and that medications have been making her have uterus complaints. Reports that it has improved with a medication she has been receiving while in [facility]. States that people in [facility] are trying to hurt her and does not feel safe. Accusing Dr Flower, previous psychiatrist, of poisoning her with the Prolixin. Denies SI/HI/AVH.

## 2024-09-30 NOTE — BH CONSULTATION LIAISON ASSESSMENT NOTE - NSBHCHARTREVIEWVS_PSY_A_CORE FT
Vital Signs Last 24 Hrs  T(C): 36.7 (30 Sep 2024 11:55), Max: 36.9 (29 Sep 2024 21:40)  T(F): 98 (30 Sep 2024 11:55), Max: 98.5 (29 Sep 2024 21:40)  HR: 67 (30 Sep 2024 11:55) (67 - 82)  BP: 155/88 (30 Sep 2024 11:55) (140/72 - 155/88)  BP(mean): --  RR: 18 (30 Sep 2024 11:55) (17 - 18)  SpO2: 100% (30 Sep 2024 11:55) (99% - 100%)    Parameters below as of 30 Sep 2024 11:55  Patient On (Oxygen Delivery Method): room air

## 2024-09-30 NOTE — EEG REPORT - NS EEG TEXT BOX
[facility]   COMPREHENSIVE EPILEPSY CENTER   REPORT OF LONG-TERM VIDEO EEG     [facility]: [address], [place] [address], Ph#: [phone]  [facility]: [address] [place] [address], Ph#: [phone]  [facility]: [address] [place] [address], Ph#: [phone]    Patient Name: SIMEON BLOOM  Age and : 73y (12)  MRN #: [id number]  Location: [place] [address] A  Referring Physician: Angelito Curtis    Start Time/Date: 11:27 on   End Time/Date: 05:38 on   Duration: 18 hours 11 minutes    _____________________________________________________________  STUDY INFORMATION    EEG Recording Technique:  The patient underwent continuous Video-EEG monitoring, using Telemetry System hardware on the XLTek Digital System. EEG and video data were stored on a computer hard drive with important events saved in digital archive files. The material was reviewed by a physician (electroencephalographer / epileptologist) on a daily basis. Stephan and seizure detection algorithms were utilized and reviewed. An EEG Technician attended to the patient, and was available throughout daytime work hours.  The epilepsy center neurologist was available in person or on call 24-hours per day.    EEG Placement and Labeling of Electrodes:  The EEG was performed utilizing 20 channel referential EEG connections (coronal over temporal over parasagittal montage) using all standard 10-20 electrode placements with EKG, with additional electrodes placed in the inferior temporal region using the modified 10-10 montage electrode placements for elective admissions, or if deemed necessary. Recording was at a sampling rate of 256 samples per second per channel. Time synchronized digital video recording was done simultaneously with EEG recording. A low light infrared camera was used for low light recording.     _____________________________________________________________  HISTORY    Patient is a 73y old  Female who presents with a chief complaint of AMS (29 Sep 2024 16:34)      PERTINENT MEDICATION:  MEDICATIONS  (STANDING):  cholecalciferol 1000 Unit(s) Oral daily  dextrose 5%. 1000 milliLiter(s) (50 mL/Hr) IV Continuous <Continuous>  dextrose 5%. 1000 milliLiter(s) (100 mL/Hr) IV Continuous <Continuous>  dextrose 50% Injectable 25 Gram(s) IV Push once  dextrose 50% Injectable 12.5 Gram(s) IV Push once  dextrose 50% Injectable 25 Gram(s) IV Push once  divalproex Sprinkle 750 milliGRAM(s) Oral two times a day  enoxaparin Injectable 40 milliGRAM(s) SubCutaneous every 24 hours  fluticasone propionate 50 MICROgram(s)/spray Nasal Spray 1 Spray(s) Both Nostrils two times a day  glucagon  Injectable 1 milliGRAM(s) IntraMuscular once  insulin lispro (ADMELOG) corrective regimen sliding scale   SubCutaneous at bedtime  insulin lispro (ADMELOG) corrective regimen sliding scale   SubCutaneous three times a day before meals  lactulose Syrup 10 Gram(s) Oral daily  levothyroxine 75 MICROGram(s) Oral daily  levothyroxine      melatonin 6 milliGRAM(s) Oral at bedtime  metoprolol succinate ER 50 milliGRAM(s) Oral daily  multivitamin 1 Tablet(s) Oral daily  QUEtiapine 50 milliGRAM(s) Oral daily  QUEtiapine 50 milliGRAM(s) Oral at bedtime  senna 2 Tablet(s) Oral at bedtime    _____________________________________________________________  STUDY INTERPRETATION      FINDINGS:      Background:  Continuity: continuous  Symmetry: symmetric  PDR: 10 Hz activity, with amplitude to 40 uV, that attenuated to eye opening.  Low amplitude frontal beta noted in wakefulness.  Reactivity: present  Voltage: normal, mostly 20-150uV  Anterior Posterior Gradient: present  Other background findings: none  Breach: absent    Background Slowing:  Generalized slowing: none was present.  Focal slowing: none was present.    State Changes:   -Drowsiness noted with increased slowing, attenuation of fast activity, vertex transients.  -Present with N2 sleep transients with symmetric spindles and K-complexes.    Sporadic Epileptiform Discharges:    None    Rhythmic and Periodic Patterns (RPPs):  None     Electrographic and Electroclinical seizures:  None    Other Clinical Events:  Frequent episodes of right > left upper extremity rhythmic shaking, without epileptiform electrographic correlate.    Activation Procedures:   -Hyperventilation was not performed.    -Photic stimulation was not performed.    Artifacts:  Intermittent myogenic and movement artifacts were noted.    ECG:  The heart rate on single channel ECG was predominantly between 70-80 bpm.    Summary:  Normal EEG in the awake / drowsy / asleep state(s).  Frequent episodes of right > left upper extremity rhythmic shaking, without epileptiform electrographic correlate.    Clinical Impression:  There were no epileptiform abnormalities recorded.    Episodes of upper extremity shaking described above are not consistent with seizures.      -------------------------------------------------------------------------------------------------------    Arleth Shepherd MD  Director, Epilepsy - [facility]    Questions please call [phone]  After hours (5 PM - 8:30 AM) please call the epilepsy answering service at [phone]       [facility]   COMPREHENSIVE EPILEPSY CENTER   REPORT OF LONG-TERM VIDEO EEG     [facility]: [address], [place] [address], Ph#: [phone]  [facility]: [address] [place] [address], Ph#: [phone]  [facility]: [address] [place] [address], Ph#: [phone]    Patient Name: SIMEON BLOOM  Age and : 73y (12)  MRN #: [id number]  Location: [place] [address] A  Referring Physician: Angelito Curtis    Start Time/Date: 11:27 on   End Time/Date: 05:38 on   Duration: 18 hours 11 minutes    _____________________________________________________________  STUDY INFORMATION    EEG Recording Technique:  The patient underwent continuous Video-EEG monitoring, using Telemetry System hardware on the XLTek Digital System. EEG and video data were stored on a computer hard drive with important events saved in digital archive files. The material was reviewed by a physician (electroencephalographer / epileptologist) on a daily basis. Stephan and seizure detection algorithms were utilized and reviewed. An EEG Technician attended to the patient, and was available throughout daytime work hours.  The epilepsy center neurologist was available in person or on call 24-hours per day.    EEG Placement and Labeling of Electrodes:  The EEG was performed utilizing 20 channel referential EEG connections (coronal over temporal over parasagittal montage) using all standard 10-20 electrode placements with EKG, with additional electrodes placed in the inferior temporal region using the modified 10-10 montage electrode placements for elective admissions, or if deemed necessary. Recording was at a sampling rate of 256 samples per second per channel. Time synchronized digital video recording was done simultaneously with EEG recording. A low light infrared camera was used for low light recording.     _____________________________________________________________  HISTORY    Patient is a 73y old  Female who presents with a chief complaint of AMS (29 Sep 2024 16:34)      PERTINENT MEDICATION:  MEDICATIONS  (STANDING):  cholecalciferol 1000 Unit(s) Oral daily  dextrose 5%. 1000 milliLiter(s) (50 mL/Hr) IV Continuous <Continuous>  dextrose 5%. 1000 milliLiter(s) (100 mL/Hr) IV Continuous <Continuous>  dextrose 50% Injectable 25 Gram(s) IV Push once  dextrose 50% Injectable 12.5 Gram(s) IV Push once  dextrose 50% Injectable 25 Gram(s) IV Push once  divalproex Sprinkle 750 milliGRAM(s) Oral two times a day  enoxaparin Injectable 40 milliGRAM(s) SubCutaneous every 24 hours  fluticasone propionate 50 MICROgram(s)/spray Nasal Spray 1 Spray(s) Both Nostrils two times a day  glucagon  Injectable 1 milliGRAM(s) IntraMuscular once  insulin lispro (ADMELOG) corrective regimen sliding scale   SubCutaneous at bedtime  insulin lispro (ADMELOG) corrective regimen sliding scale   SubCutaneous three times a day before meals  lactulose Syrup 10 Gram(s) Oral daily  levothyroxine 75 MICROGram(s) Oral daily  levothyroxine      melatonin 6 milliGRAM(s) Oral at bedtime  metoprolol succinate ER 50 milliGRAM(s) Oral daily  multivitamin 1 Tablet(s) Oral daily  QUEtiapine 50 milliGRAM(s) Oral daily  QUEtiapine 50 milliGRAM(s) Oral at bedtime  senna 2 Tablet(s) Oral at bedtime    _____________________________________________________________  STUDY INTERPRETATION      FINDINGS:      Background:  Continuity: continuous  Symmetry: symmetric  PDR: 10 Hz activity, with amplitude to 40 uV, that attenuated to eye opening.  Low amplitude frontal beta noted in wakefulness.  Reactivity: present  Voltage: normal, mostly 20-150uV  Anterior Posterior Gradient: present  Other background findings: none  Breach: absent    Background Slowing:  Generalized slowing: none was present.  Focal slowing: none was present.    State Changes:   -Drowsiness noted with increased slowing, attenuation of fast activity, vertex transients.  -Present with N2 sleep transients with symmetric spindles and K-complexes.    Sporadic Epileptiform Discharges:    None    Rhythmic and Periodic Patterns (RPPs):  None     Electrographic and Electroclinical seizures:  None    Other Clinical Events:  Frequent episodes of right > left upper extremity rhythmic shaking, without epileptiform electrographic correlate.    Activation Procedures:   -Hyperventilation was not performed.    -Photic stimulation did not active abnormalities.    Artifacts:  Intermittent myogenic and movement artifacts were noted.    ECG:  The heart rate on single channel ECG was predominantly between 70-80 bpm.    Summary:  Normal EEG in the awake / drowsy / asleep state(s).  Frequent episodes of right > left upper extremity rhythmic shaking, without epileptiform electrographic correlate.    Clinical Impression:  There were no epileptiform abnormalities recorded.    Episodes of upper extremity shaking described above are not consistent with seizures.      -------------------------------------------------------------------------------------------------------    Arleth Shepherd MD  Director, Epilepsy - [facility]    Questions please call [phone]  After hours (5 PM - 8:30 AM) please call the epilepsy answering service at [phone]

## 2024-09-30 NOTE — BH CONSULTATION LIAISON ASSESSMENT NOTE - SUMMARY
Ms Azul is a 74 yo F w history of SCZ, Parkinson's dz, hypothyroidism, and multiple psych hospitalizations who presents for AMS. Pt transferred from [facility], where she was found to have fallen and was covered in urine. Pt has been hospitalized in [facility] with occasional stays at [facility] for medical concerns since the start of this year for psychosis. Per [facility] notes, pt appears to be awaiting transfer to [facility] hospital. Pt has been delusional at [facility] with recent delusions surrounding pregnancy. Current medications are Depakote 750 mg bid, Seroquel 50 mg bid, and Prolixin Dec 12.5 mg IM q2w (last dose 9/24). Psychiatry was consulted for continued psychiatric care.  9/30: Pt continues to be paranoid but no agitation noted. EEG done; no seizures recorded. Pending ongoing medical work up. Hold med adjustments at present.    Recs:  - Continue Depakote 750 mg bid  - Continue Seroquel 50 mg bid  	- hold if QTC > 500  - Next Prolixin Dec 12.5 mg IM q2w due 10/8/24  - consider Zyprexa 1.25 mg po/IM q6h prn for agitation if warranted; hold for QTC > 500    - Obs: defer to primary team, no current indications from psych standpoint  - Dispo: TBD, likely return to [facility] once medically optimized Ms Azul is a 74 yo F w history of SCZ, Parkinson's dz, hypothyroidism, and multiple psych hospitalizations who presents for AMS. Pt transferred from [facility], where she was found to have fallen and was covered in urine. Pt has been hospitalized in [facility] with occasional stays at [facility] for medical concerns since the start of this year for psychosis. Per [facility] notes, pt appears to be awaiting transfer to [facility] hospital. Pt has been delusional at [facility] with recent delusions surrounding pregnancy. Current medications are Depakote 750 mg bid, Seroquel 50 mg bid, and Prolixin Dec 12.5 mg IM q2w (last dose 9/24). Psychiatry was consulted for continued psychiatric care.    9/30: Pt continues to be paranoid but no agitation noted. EEG done; no seizures recorded. Pending ongoing medical work up. Hold med adjustments at present.    Recs:  - Continue Depakote 750 mg bid  - Continue Seroquel 50 mg bid  	- hold if QTC > 500  - Next Prolixin Dec 12.5 mg IM q2w due 10/8/24  - consider Zyprexa 1.25 mg po/IM q6h prn for agitation if warranted; hold for QTC > 500    - Obs: defer to primary team, no current indications from psych standpoint  - Dispo: TBD, likely return to [facility] once medically optimized

## 2024-09-30 NOTE — SWALLOW VFSS/MBS ASSESSMENT ADULT - DEMONSTRATES NEED FOR REFERRAL TO ANOTHER SERVICE
Consider GI given incidental finding of an Esophageal Web which may exacerbate the swallow at MD's discretion/GI

## 2024-09-30 NOTE — PROGRESS NOTE ADULT - PROBLEM SELECTOR PLAN 1
- appears at baseline  - events leading to transfer from [facility] noted and stated as above, now improving back to aaox3 and following commands; sp stroke code with cta, cth, and ct perfusion neg for acute pathology  - neuro consulted; recs appreciated  - EEG without seizure activity  - f/u MRI brain   - toxic metabolic encepaholopathy ; infectious w/u thus far negative  - q4h neuro check  - monitor on tele   - echo unremarkable  - delirium precautions  - c/w valproic acid sprinkles 750mg BID for now  - SLP eval noted, esophagram ordered to rule out silent aspiration     #dilated pupil  -baseline anisocoria (OD 8mm, OS 1mm) per chart review    #interstitial infiltrates  - CXR noted for b/l interstitial prominence 2/2 to vascular congestive changes  - afebrile, no wbc count, monitor for now off abx therapy pending further encephalopathy workup

## 2024-09-30 NOTE — PROGRESS NOTE ADULT - SUBJECTIVE AND OBJECTIVE BOX
PROGRESS NOTE:     Patient is a 73y old  Female who presents with a chief complaint of AMS (29 Sep 2024 16:34)      SUBJECTIVE / OVERNIGHT EVENTS: no acute overnight events    ADDITIONAL REVIEW OF SYSTEMS:    MEDICATIONS  (STANDING):  cholecalciferol 1000 Unit(s) Oral daily  dextrose 5%. 1000 milliLiter(s) (50 mL/Hr) IV Continuous <Continuous>  dextrose 5%. 1000 milliLiter(s) (100 mL/Hr) IV Continuous <Continuous>  dextrose 50% Injectable 25 Gram(s) IV Push once  dextrose 50% Injectable 12.5 Gram(s) IV Push once  dextrose 50% Injectable 25 Gram(s) IV Push once  divalproex Sprinkle 750 milliGRAM(s) Oral two times a day  enoxaparin Injectable 40 milliGRAM(s) SubCutaneous every 24 hours  fluticasone propionate 50 MICROgram(s)/spray Nasal Spray 1 Spray(s) Both Nostrils two times a day  glucagon  Injectable 1 milliGRAM(s) IntraMuscular once  insulin lispro (ADMELOG) corrective regimen sliding scale   SubCutaneous at bedtime  insulin lispro (ADMELOG) corrective regimen sliding scale   SubCutaneous three times a day before meals  lactulose Syrup 10 Gram(s) Oral daily  levothyroxine 75 MICROGram(s) Oral daily  levothyroxine      melatonin 6 milliGRAM(s) Oral at bedtime  metoprolol succinate ER 50 milliGRAM(s) Oral daily  multivitamin 1 Tablet(s) Oral daily  QUEtiapine 50 milliGRAM(s) Oral daily  QUEtiapine 50 milliGRAM(s) Oral at bedtime  senna 2 Tablet(s) Oral at bedtime    MEDICATIONS  (PRN):  acetaminophen     Tablet .. 650 milliGRAM(s) Oral every 6 hours PRN Temp greater or equal to 38C (100.4F), Mild Pain (1 - 3)  albuterol    90 MICROgram(s) HFA Inhaler 2 Puff(s) Inhalation every 6 hours PRN Shortness of Breath and/or Wheezing  aluminum hydroxide/magnesium hydroxide/simethicone Suspension 30 milliLiter(s) Oral every 4 hours PRN Dyspepsia  bisacodyl 5 milliGRAM(s) Oral every 12 hours PRN Constipation  dextrose Oral Gel 15 Gram(s) Oral once PRN Blood Glucose LESS THAN 70 milliGRAM(s)/deciliter  simethicone 80 milliGRAM(s) Chew three times a day PRN Gas      CAPILLARY BLOOD GLUCOSE      POCT Blood Glucose.: 95 mg/dL (30 Sep 2024 12:28)  POCT Blood Glucose.: 143 mg/dL (30 Sep 2024 09:19)  POCT Blood Glucose.: 70 mg/dL (30 Sep 2024 08:45)  POCT Blood Glucose.: 171 mg/dL (29 Sep 2024 21:37)  POCT Blood Glucose.: 97 mg/dL (29 Sep 2024 17:41)    I&O's Summary      PHYSICAL EXAM:  Vital Signs Last 24 Hrs  T(C): 36.7 (30 Sep 2024 11:55), Max: 36.9 (29 Sep 2024 21:40)  T(F): 98 (30 Sep 2024 11:55), Max: 98.5 (29 Sep 2024 21:40)  HR: 67 (30 Sep 2024 11:55) (67 - 82)  BP: 155/88 (30 Sep 2024 11:55) (140/72 - 155/88)  BP(mean): --  RR: 18 (30 Sep 2024 11:55) (17 - 18)  SpO2: 100% (30 Sep 2024 11:55) (99% - 100%)    Parameters below as of 30 Sep 2024 11:55  Patient On (Oxygen Delivery Method): room air        CONSTITUTIONAL: NAD  RESPIRATORY: Normal respiratory effort; lungs are clear to auscultation bilaterally  CARDIOVASCULAR: Regular rate and rhythm, normal S1 and S2, no murmur/rub/gallop;  ABDOMEN: Nontender to palpation, normoactive bowel sounds, no rebound/guarding;  MUSCLOSKELETAL: no clubbing or cyanosis of digits; no joint swelling or tenderness to palpation  PSYCH: A+O to person, place, and time; affect appropriate    LABS:                        12.9   5.05  )-----------( 147      ( 30 Sep 2024 04:45 )             41.1     09-30    137  |  103  |  18  ----------------------------<  75  4.8   |  20[L]  |  0.76    Ca    9.0      30 Sep 2024 04:45  Phos  4.3     09-30  Mg     1.70     09-30    TPro  7.1  /  Alb  3.7  /  TBili  <0.2  /  DBili  x   /  AST  36[H]  /  ALT  24  /  AlkPhos  83  09-29    PT/INR - ( 29 Sep 2024 01:59 )   PT: 14.4 sec;   INR: 1.24 ratio         PTT - ( 29 Sep 2024 01:59 )  PTT:36.7 sec      Urinalysis Basic - ( 30 Sep 2024 04:45 )    Color: x / Appearance: x / SG: x / pH: x  Gluc: 75 mg/dL / Ketone: x  / Bili: x / Urobili: x   Blood: x / Protein: x / Nitrite: x   Leuk Esterase: x / RBC: x / WBC x   Sq Epi: x / Non Sq Epi: x / Bacteria: x          RADIOLOGY & ADDITIONAL TESTS:  Results Reviewed:   Imaging Personally Reviewed:  Electrocardiogram Personally Reviewed:    COORDINATION OF CARE:  Care Discussed with Consultants/Other Providers [Y/N]:  Prior or Outpatient Records Reviewed [Y/N]:

## 2024-09-30 NOTE — SWALLOW VFSS/MBS ASSESSMENT ADULT - RECOMMENDED CONSISTENCY
1. Continue Minced and Moist Solids with Thin Liquids  2. Swallowing Guidelines: Seated Upright during Mealtimes; Small Bites; Single/Small Sips; One Bite/Sip at a Time  3. Maintain Adequate Oral Hygiene  4. Aspiration and Reflux Precautions

## 2024-09-30 NOTE — BH CONSULTATION LIAISON ASSESSMENT NOTE - OTHER PAST PSYCHIATRIC HISTORY (INCLUDE DETAILS REGARDING ONSET, COURSE OF ILLNESS, INPATIENT/OUTPATIENT TREATMENT)
Recent transfer from [facility]. Has been hospitalized since January. History of multiple hospitalizations. Currently on Depakote, Seroquel, Prolixin Dec.

## 2024-09-30 NOTE — BH CONSULTATION LIAISON ASSESSMENT NOTE - NSBHCHARTREVIEWLAB_PSY_A_CORE FT
09-30    137  |  103  |  18  ----------------------------<  75  4.8   |  20[L]  |  0.76    Ca    9.0      30 Sep 2024 04:45  Phos  4.3     09-30  Mg     1.70     09-30    TPro  7.1  /  Alb  3.7  /  TBili  <0.2  /  DBili  x   /  AST  36[H]  /  ALT  24  /  AlkPhos  83  09-29

## 2024-09-30 NOTE — SWALLOW VFSS/MBS ASSESSMENT ADULT - DIAGNOSTIC IMPRESSIONS
1. Mild Oral Stage for puree, soft/bite solids, mildly thick liquids and thin liquids marked by adequate oral containment, adequate bolus manipulation, slow/prolonged mastication for soft/bite solids, adequate anterior to posterior transfer, premature spillage to the hypopharynx (vallecular) due to reduced tongue/palate seal greater for thin liquids, and adequate oral clearance.   2. Mild Pharyngeal Stage for puree, soft/bite solids, mildly thick liquids, and thin liquids characterized by delayed initiation of the pharyngeal swallow (bolus head at the vallecular for thin liquids), adequate laryngeal elevation, adequate laryngeal vestibule closure, reduced base of tongue retraction, adequate epiglottic deflection, and adequate pharyngeal contractility. There is trace pharyngeal clearance deficits located at the vallecular post primary swallow greater for thin liquids. Spontaneous reswallow benefits to improve pharyngeal clearance. There is laryngeal penetration during the swallow with retrieval for mildly thick and thin liquids with airway protection maintained. There is No Aspiration before, during, or after the swallow for puree, soft/bite solids, mildly thick liquids, and thin liquids.    Of note, there is an incidental finding of an esophageal web as per Radiologist. 1. Mild Oral Stage for puree, soft/bite solids, mildly thick liquids and thin liquids marked by adequate oral containment, adequate bolus manipulation, slow/prolonged mastication for soft/bite solids, adequate anterior to posterior transfer, premature spillage to the hypopharynx (vallecular) due to reduced tongue/palate seal greater for thin liquids, and adequate oral clearance.   2. Mild Pharyngeal Stage for puree, soft/bite solids, mildly thick liquids, and thin liquids characterized by delayed initiation of the pharyngeal swallow (bolus head at the vallecular for thin liquids), adequate laryngeal elevation, adequate laryngeal vestibule closure, reduced base of tongue retraction, adequate epiglottic deflection, and adequate pharyngeal contractility. There is trace pharyngeal clearance deficits located at the vallecular post primary swallow greater for thin liquids. Spontaneous reswallow benefits to improve pharyngeal clearance. There is trace laryngeal penetration during the swallow with retrieval for mildly thick and thin liquids with airway protection maintained. There is No Aspiration before, during, or after the swallow for puree, soft/bite solids, mildly thick liquids, and thin liquids.    Of note, there is an incidental finding of an esophageal web as per Radiologist.

## 2024-09-30 NOTE — SWALLOW VFSS/MBS ASSESSMENT ADULT - COMMENTS
Progress  Note- Hospitalist 9/30: "This is a 74 y/o F with PMH of recurrent major depressive disorder, htn, niddmt2, hld, parkinson's disease (not on active meds), hypothyroidism, hx of seizure disorder per chart review who presented from [facility] overnight after noted to have unwitnessed fall around 0028 hours, lying on L side, disoriented, slurring her words, episode of urinary incontinence without recollection of preceding events. Stroke code on presentation. Admitted for further workup for possible seizure."    Of note, patient seen for a clinical swallow evaluation on 9/29 with recommendations of Minced and Moist Solids with Thin Liquids and a Cinesophagram (see note for details)     Patient received in Radiology Suite for a Cinesophagram. Patient transferred to a specialized unit with lateral view projection.

## 2024-09-30 NOTE — BH CONSULTATION LIAISON ASSESSMENT NOTE - CURRENT MEDICATION
MEDICATIONS  (STANDING):  cholecalciferol 1000 Unit(s) Oral daily  dextrose 5%. 1000 milliLiter(s) (50 mL/Hr) IV Continuous <Continuous>  dextrose 5%. 1000 milliLiter(s) (100 mL/Hr) IV Continuous <Continuous>  dextrose 50% Injectable 25 Gram(s) IV Push once  dextrose 50% Injectable 12.5 Gram(s) IV Push once  dextrose 50% Injectable 25 Gram(s) IV Push once  divalproex Sprinkle 750 milliGRAM(s) Oral two times a day  enoxaparin Injectable 40 milliGRAM(s) SubCutaneous every 24 hours  fluticasone propionate 50 MICROgram(s)/spray Nasal Spray 1 Spray(s) Both Nostrils two times a day  glucagon  Injectable 1 milliGRAM(s) IntraMuscular once  insulin lispro (ADMELOG) corrective regimen sliding scale   SubCutaneous at bedtime  insulin lispro (ADMELOG) corrective regimen sliding scale   SubCutaneous three times a day before meals  lactulose Syrup 10 Gram(s) Oral daily  levothyroxine 75 MICROGram(s) Oral daily  levothyroxine      melatonin 6 milliGRAM(s) Oral at bedtime  metoprolol succinate ER 50 milliGRAM(s) Oral daily  multivitamin 1 Tablet(s) Oral daily  QUEtiapine 50 milliGRAM(s) Oral at bedtime  QUEtiapine 50 milliGRAM(s) Oral daily  senna 2 Tablet(s) Oral at bedtime    MEDICATIONS  (PRN):  acetaminophen     Tablet .. 650 milliGRAM(s) Oral every 6 hours PRN Temp greater or equal to 38C (100.4F), Mild Pain (1 - 3)  albuterol    90 MICROgram(s) HFA Inhaler 2 Puff(s) Inhalation every 6 hours PRN Shortness of Breath and/or Wheezing  aluminum hydroxide/magnesium hydroxide/simethicone Suspension 30 milliLiter(s) Oral every 4 hours PRN Dyspepsia  bisacodyl 5 milliGRAM(s) Oral every 12 hours PRN Constipation  dextrose Oral Gel 15 Gram(s) Oral once PRN Blood Glucose LESS THAN 70 milliGRAM(s)/deciliter  simethicone 80 milliGRAM(s) Chew three times a day PRN Gas

## 2024-10-01 LAB
ANION GAP SERPL CALC-SCNC: 12 MMOL/L — SIGNIFICANT CHANGE UP (ref 7–14)
BUN SERPL-MCNC: 14 MG/DL — SIGNIFICANT CHANGE UP (ref 7–23)
CALCIUM SERPL-MCNC: 8.8 MG/DL — SIGNIFICANT CHANGE UP (ref 8.4–10.5)
CHLORIDE SERPL-SCNC: 105 MMOL/L — SIGNIFICANT CHANGE UP (ref 98–107)
CO2 SERPL-SCNC: 24 MMOL/L — SIGNIFICANT CHANGE UP (ref 22–31)
CREAT SERPL-MCNC: 0.7 MG/DL — SIGNIFICANT CHANGE UP (ref 0.5–1.3)
EGFR: 92 ML/MIN/1.73M2 — SIGNIFICANT CHANGE UP
GLUCOSE SERPL-MCNC: 73 MG/DL — SIGNIFICANT CHANGE UP (ref 70–99)
HCT VFR BLD CALC: 37.4 % — SIGNIFICANT CHANGE UP (ref 34.5–45)
HGB BLD-MCNC: 12 G/DL — SIGNIFICANT CHANGE UP (ref 11.5–15.5)
MAGNESIUM SERPL-MCNC: 1.8 MG/DL — SIGNIFICANT CHANGE UP (ref 1.6–2.6)
MCHC RBC-ENTMCNC: 26.9 PG — LOW (ref 27–34)
MCHC RBC-ENTMCNC: 32.1 GM/DL — SIGNIFICANT CHANGE UP (ref 32–36)
MCV RBC AUTO: 83.9 FL — SIGNIFICANT CHANGE UP (ref 80–100)
NRBC # BLD: 0 /100 WBCS — SIGNIFICANT CHANGE UP (ref 0–0)
NRBC # FLD: 0 K/UL — SIGNIFICANT CHANGE UP (ref 0–0)
PHOSPHATE SERPL-MCNC: 3.4 MG/DL — SIGNIFICANT CHANGE UP (ref 2.5–4.5)
PLATELET # BLD AUTO: 165 K/UL — SIGNIFICANT CHANGE UP (ref 150–400)
POTASSIUM SERPL-MCNC: 4.5 MMOL/L — SIGNIFICANT CHANGE UP (ref 3.5–5.3)
POTASSIUM SERPL-SCNC: 4.5 MMOL/L — SIGNIFICANT CHANGE UP (ref 3.5–5.3)
RBC # BLD: 4.46 M/UL — SIGNIFICANT CHANGE UP (ref 3.8–5.2)
RBC # FLD: 15.4 % — HIGH (ref 10.3–14.5)
SODIUM SERPL-SCNC: 141 MMOL/L — SIGNIFICANT CHANGE UP (ref 135–145)
T PALLIDUM AB TITR SER: NEGATIVE — SIGNIFICANT CHANGE UP
WBC # BLD: 3.99 K/UL — SIGNIFICANT CHANGE UP (ref 3.8–10.5)
WBC # FLD AUTO: 3.99 K/UL — SIGNIFICANT CHANGE UP (ref 3.8–10.5)

## 2024-10-01 PROCEDURE — 99232 SBSQ HOSP IP/OBS MODERATE 35: CPT

## 2024-10-01 RX ORDER — CHLORHEXIDINE GLUCONATE 40 MG/ML
1 SOLUTION TOPICAL
Refills: 0 | Status: DISCONTINUED | OUTPATIENT
Start: 2024-10-01 | End: 2024-10-21

## 2024-10-01 RX ADMIN — Medication 1 TABLET(S): at 11:05

## 2024-10-01 RX ADMIN — Medication 75 MICROGRAM(S): at 06:01

## 2024-10-01 RX ADMIN — Medication 1000 UNIT(S): at 11:04

## 2024-10-01 RX ADMIN — Medication 50 MILLIGRAM(S): at 06:02

## 2024-10-01 RX ADMIN — DIVALPROEX SODIUM 750 MILLIGRAM(S): 250 TABLET, FILM COATED, EXTENDED RELEASE ORAL at 06:07

## 2024-10-01 RX ADMIN — QUETIAPINE FUMARATE 50 MILLIGRAM(S): 200 TABLET ORAL at 11:05

## 2024-10-01 RX ADMIN — Medication 6 MILLIGRAM(S): at 22:43

## 2024-10-01 RX ADMIN — QUETIAPINE FUMARATE 50 MILLIGRAM(S): 200 TABLET ORAL at 22:43

## 2024-10-01 RX ADMIN — Medication 10 GRAM(S): at 11:03

## 2024-10-01 RX ADMIN — CHLORHEXIDINE GLUCONATE 1 APPLICATION(S): 40 SOLUTION TOPICAL at 18:10

## 2024-10-01 RX ADMIN — DIVALPROEX SODIUM 750 MILLIGRAM(S): 250 TABLET, FILM COATED, EXTENDED RELEASE ORAL at 18:03

## 2024-10-01 RX ADMIN — Medication 40 MILLIGRAM(S): at 11:11

## 2024-10-01 RX ADMIN — FLUTICASONE PROPIONATE 1 SPRAY(S): 50 SPRAY, METERED NASAL at 18:02

## 2024-10-01 NOTE — PROGRESS NOTE ADULT - PROBLEM SELECTOR PLAN 1
- appears at baseline  - events leading to transfer from [facility] noted and stated as above, now improving back to aaox3 and following commands; sp stroke code with cta, cth, and ct perfusion neg for acute pathology  - neuro consulted; recs appreciated  - EEG without seizure activity  - MRI brain pending   - toxic metabolic encepaholopathy ; infectious w/u thus far negative  - echo unremarkable  - c/w valproic acid sprinkles 750mg BID for now  - SLP eval noted, esophagram ordered to rule out silent aspiration     #dilated pupil  -baseline anisocoria (OD 8mm, OS 1mm) per chart review

## 2024-10-01 NOTE — PROGRESS NOTE ADULT - SUBJECTIVE AND OBJECTIVE BOX
PROGRESS NOTE:     Patient is a 73y old  Female who presents with a chief complaint of AMS (30 Sep 2024 15:16)      SUBJECTIVE / OVERNIGHT EVENTS: no acute events overnight    ADDITIONAL REVIEW OF SYSTEMS:    MEDICATIONS  (STANDING):  chlorhexidine 2% Cloths 1 Application(s) Topical <User Schedule>  cholecalciferol 1000 Unit(s) Oral daily  dextrose 5%. 1000 milliLiter(s) (50 mL/Hr) IV Continuous <Continuous>  dextrose 5%. 1000 milliLiter(s) (100 mL/Hr) IV Continuous <Continuous>  dextrose 50% Injectable 25 Gram(s) IV Push once  dextrose 50% Injectable 12.5 Gram(s) IV Push once  dextrose 50% Injectable 25 Gram(s) IV Push once  divalproex Sprinkle 750 milliGRAM(s) Oral two times a day  enoxaparin Injectable 40 milliGRAM(s) SubCutaneous every 24 hours  fluticasone propionate 50 MICROgram(s)/spray Nasal Spray 1 Spray(s) Both Nostrils two times a day  glucagon  Injectable 1 milliGRAM(s) IntraMuscular once  insulin lispro (ADMELOG) corrective regimen sliding scale   SubCutaneous at bedtime  insulin lispro (ADMELOG) corrective regimen sliding scale   SubCutaneous three times a day before meals  lactulose Syrup 10 Gram(s) Oral daily  levothyroxine 75 MICROGram(s) Oral daily  levothyroxine      melatonin 6 milliGRAM(s) Oral at bedtime  metoprolol succinate ER 50 milliGRAM(s) Oral daily  multivitamin 1 Tablet(s) Oral daily  QUEtiapine 50 milliGRAM(s) Oral at bedtime  QUEtiapine 50 milliGRAM(s) Oral daily  senna 2 Tablet(s) Oral at bedtime    MEDICATIONS  (PRN):  acetaminophen     Tablet .. 650 milliGRAM(s) Oral every 6 hours PRN Temp greater or equal to 38C (100.4F), Mild Pain (1 - 3)  albuterol    90 MICROgram(s) HFA Inhaler 2 Puff(s) Inhalation every 6 hours PRN Shortness of Breath and/or Wheezing  aluminum hydroxide/magnesium hydroxide/simethicone Suspension 30 milliLiter(s) Oral every 4 hours PRN Dyspepsia  bisacodyl 5 milliGRAM(s) Oral every 12 hours PRN Constipation  dextrose Oral Gel 15 Gram(s) Oral once PRN Blood Glucose LESS THAN 70 milliGRAM(s)/deciliter  simethicone 80 milliGRAM(s) Chew three times a day PRN Gas      CAPILLARY BLOOD GLUCOSE      POCT Blood Glucose.: 101 mg/dL (01 Oct 2024 12:53)  POCT Blood Glucose.: 95 mg/dL (01 Oct 2024 08:36)  POCT Blood Glucose.: 139 mg/dL (30 Sep 2024 22:21)  POCT Blood Glucose.: 84 mg/dL (30 Sep 2024 17:14)    I&O's Summary      PHYSICAL EXAM:  Vital Signs Last 24 Hrs  T(C): 36.6 (01 Oct 2024 11:55), Max: 36.8 (30 Sep 2024 15:55)  T(F): 97.9 (01 Oct 2024 11:55), Max: 98.2 (30 Sep 2024 15:55)  HR: 66 (01 Oct 2024 11:55) (65 - 89)  BP: 136/83 (01 Oct 2024 11:55) (128/76 - 142/90)  BP(mean): --  RR: 17 (01 Oct 2024 11:55) (17 - 18)  SpO2: 100% (01 Oct 2024 11:55) (97% - 100%)    Parameters below as of 01 Oct 2024 11:55  Patient On (Oxygen Delivery Method): room air        CONSTITUTIONAL: agitated  RESPIRATORY: Normal respiratory effort; lungs are clear to auscultation bilaterally  CARDIOVASCULAR: Regular rate and rhythm, normal S1 and S2, no murmur/rub/gallop  ABDOMEN: Nontender to palpation, normoactive bowel sounds, no rebound/guarding  PSYCH: Agitated    LABS:                        12.0   3.99  )-----------( 165      ( 01 Oct 2024 06:00 )             37.4     10-01    141  |  105  |  14  ----------------------------<  73  4.5   |  24  |  0.70    Ca    8.8      01 Oct 2024 06:00  Phos  3.4     10-01  Mg     1.80     10-01            Urinalysis Basic - ( 01 Oct 2024 06:00 )    Color: x / Appearance: x / SG: x / pH: x  Gluc: 73 mg/dL / Ketone: x  / Bili: x / Urobili: x   Blood: x / Protein: x / Nitrite: x   Leuk Esterase: x / RBC: x / WBC x   Sq Epi: x / Non Sq Epi: x / Bacteria: x        Culture - Urine (collected 29 Sep 2024 17:25)  Source: Clean Catch Clean Catch (Midstream)  Final Report (30 Sep 2024 21:50):    Culture grew 3 or more types of organisms which indicate    collection contamination; consider recollection only if clinically    indicated.        RADIOLOGY & ADDITIONAL TESTS:  Results Reviewed:   Imaging Personally Reviewed:  Electrocardiogram Personally Reviewed:    COORDINATION OF CARE:  Care Discussed with Consultants/Other Providers [Y/N]:  Prior or Outpatient Records Reviewed [Y/N]:

## 2024-10-01 NOTE — PROGRESS NOTE ADULT - PROBLEM SELECTOR PLAN 2
- admission at [facility] since May for it  - on seroquel 50mg qhs and 50mg daily per last meds from chart transfer note  - c/w psych meds  - delirium precautions  - was on fluphanzine prn for agitation there, will monitor for now and tx symptomatically for agitation based on team assessment  - c/w melatonin qhs

## 2024-10-01 NOTE — BH CONSULTATION LIAISON PROGRESS NOTE - NSBHFUPINTERVALHXFT_PSY_A_CORE
Pt seen at bedside. No prn meds required. Pt states that she does not like the food here in [facility] and wants to have Chinese food but no one will bring it in for her. States that it is near Halloween and she likes candy. Resistant to getting finger poked for blood sugar check but eventually agreed to it.    Denies SI/HI/AVH. Not feeling safe; paranoia noted.

## 2024-10-01 NOTE — BH CONSULTATION LIAISON PROGRESS NOTE - NSBHASSESSMENTFT_PSY_ALL_CORE
Ms Azul is a 74 yo F w history of SCZ, Parkinson's dz, hypothyroidism, and multiple psych hospitalizations who presents for AMS. Pt transferred from [facility], where she was found to have fallen and was covered in urine. Pt has been hospitalized in [facility] with occasional stays at [facility] for medical concerns since the start of this year for psychosis. Per [facility] notes, pt appears to be awaiting transfer to [facility] hospital. Pt has been delusional at [facility] with recent delusions surrounding pregnancy. Current medications are Depakote 750 mg bid, Seroquel 50 mg bid, and Prolixin Dec 12.5 mg IM q2w (last dose 9/24). Psychiatry was consulted for continued psychiatric care.    9/30: Pt continues to be paranoid but no agitation noted. EEG done; no seizures recorded. Pending ongoing medical work up.  10/1: Pt continues to be paranoid; no prns required. Ongoing medial work up; once done plan to return to [facility].    Recs:  - Continue Depakote 750 mg bid  - Continue Seroquel 50 mg bid  	- hold if QTC > 500  - Next Prolixin Dec 12.5 mg IM q2w due 10/8/24  - consider Zyprexa 1.25 mg po/IM q6h prn for agitation if warranted; hold for QTC > 500    - Obs: defer to primary team, no current indications from psych standpoint  - Dispo: TBD, likely return to [facility] once medically optimized

## 2024-10-02 LAB
WNV IGG TITR FLD: NEGATIVE — SIGNIFICANT CHANGE UP
WNV IGM SPEC QL: NEGATIVE — SIGNIFICANT CHANGE UP
WNV RNA SPEC QL NAA+PROBE: SIGNIFICANT CHANGE UP
WNV RNA SPEC QL NAA+PROBE: SIGNIFICANT CHANGE UP

## 2024-10-02 PROCEDURE — 99232 SBSQ HOSP IP/OBS MODERATE 35: CPT

## 2024-10-02 RX ORDER — DIVALPROEX SODIUM 250 MG/1
1000 TABLET, FILM COATED, EXTENDED RELEASE ORAL
Refills: 0 | Status: DISCONTINUED | OUTPATIENT
Start: 2024-10-02 | End: 2024-10-20

## 2024-10-02 RX ADMIN — Medication 2 TABLET(S): at 23:38

## 2024-10-02 RX ADMIN — Medication 1 TABLET(S): at 11:24

## 2024-10-02 RX ADMIN — DIVALPROEX SODIUM 1000 MILLIGRAM(S): 250 TABLET, FILM COATED, EXTENDED RELEASE ORAL at 17:04

## 2024-10-02 RX ADMIN — FLUTICASONE PROPIONATE 1 SPRAY(S): 50 SPRAY, METERED NASAL at 17:05

## 2024-10-02 RX ADMIN — QUETIAPINE FUMARATE 50 MILLIGRAM(S): 200 TABLET ORAL at 23:38

## 2024-10-02 RX ADMIN — Medication 50 MILLIGRAM(S): at 06:14

## 2024-10-02 RX ADMIN — Medication 40 MILLIGRAM(S): at 11:24

## 2024-10-02 RX ADMIN — CHLORHEXIDINE GLUCONATE 1 APPLICATION(S): 40 SOLUTION TOPICAL at 06:15

## 2024-10-02 RX ADMIN — Medication 6 MILLIGRAM(S): at 23:37

## 2024-10-02 RX ADMIN — QUETIAPINE FUMARATE 50 MILLIGRAM(S): 200 TABLET ORAL at 11:24

## 2024-10-02 RX ADMIN — DIVALPROEX SODIUM 750 MILLIGRAM(S): 250 TABLET, FILM COATED, EXTENDED RELEASE ORAL at 06:15

## 2024-10-02 RX ADMIN — Medication 1000 UNIT(S): at 11:24

## 2024-10-02 NOTE — BH CONSULTATION LIAISON PROGRESS NOTE - NSBHASSESSMENTFT_PSY_ALL_CORE
Ms Azul is a 74 yo F w history of SCZ, Parkinson's dz, hypothyroidism, and multiple psych hospitalizations who presents for AMS. Pt transferred from [facility], where she was found to have fallen and was covered in urine. Pt has been hospitalized in [facility] with occasional stays at [facility] for medical concerns since the start of this year for psychosis. Per [facility] notes, pt appears to be awaiting transfer to [facility] hospital. Pt has been delusional at [facility] with recent delusions surrounding pregnancy. Current medications are Depakote 750 mg bid, Seroquel 50 mg bid, and Prolixin Dec 12.5 mg IM q2w (last dose 9/24). Psychiatry was consulted for continued psychiatric care.    9/30: Pt continues to be paranoid but no agitation noted. EEG done; no seizures recorded. Pending ongoing medical work up.  10/1: Pt continues to be paranoid; no prns required. Ongoing medial work up; once done plan to return to [facility].  10/2: No change today. Once optimized, will return to [facility]. 2PC completed.    Recs:  - Continue Depakote 750 mg bid  - Continue Seroquel 50 mg bid  	- hold if QTC > 500  - Next Prolixin Dec 12.5 mg IM q2w due 10/8/24  - consider Zyprexa 1.25 mg po/IM q6h prn for agitation if warranted; hold for QTC > 500    - Obs: defer to primary team, no current indications from psych standpoint  - Dispo: TBD, likely return to [facility] once medically optimized; 2PC completed and in the chart 10/2/24 Ms Azul is a 74 yo F w history of SCZ, Parkinson's dz, hypothyroidism, and multiple psych hospitalizations who presents for AMS. Pt transferred from [facility], where she was found to have fallen and was covered in urine. Pt has been hospitalized in [facility] with occasional stays at [facility] for medical concerns since the start of this year for psychosis. Per [facility] notes, pt appears to be awaiting transfer to [facility] hospital. Pt has been delusional at [facility] with recent delusions surrounding pregnancy. Current medications are Depakote 750 mg bid, Seroquel 50 mg bid, and Prolixin Dec 12.5 mg IM q2w (last dose 9/24). Psychiatry was consulted for continued psychiatric care.    9/30: Pt continues to be paranoid but no agitation noted. EEG done; no seizures recorded. Pending ongoing medical work up.  10/1: Pt continues to be paranoid; no prns required. Ongoing medial work up; once done plan to return to [facility].  10/2: No change today. Once optimized, will return to [facility]. 2PC completed.    Recs:  - Continue Depakote 750 mg bid. Check VA level tomorrow before AM dose of Depakote, along with CMP and ammonia  - Continue Seroquel 50 mg bid  	- hold if QTC > 500  - Next Prolixin Dec 12.5 mg IM q2w due 10/8/24  - consider Zyprexa 1.25 mg po/IM q6h prn for agitation if warranted; hold for QTC > 500    - Obs: defer to primary team, no current indications from psych standpoint  - Dispo: TBD, likely return to [facility] once medically optimized; 2PC completed and in the chart 10/2/24

## 2024-10-02 NOTE — CHART NOTE - NSCHARTNOTEFT_GEN_A_CORE
Notified by RN that patient refused MRI. Spoke w/ patient at bedside and pt states she refused since she thought she would not be able to eat prior to MRI and thought she needed IV for MRI. Reassured her that she is allowed to eat and MRI is ordered as noncontrast therefore IV is not needed. Pt now in agreement. Spoke w/ MRI tech to put her back on the schedule.

## 2024-10-02 NOTE — PROVIDER CONTACT NOTE (OTHER) - SITUATION
Pt requested IV be removed and maybe try to get another one in the AM; IV had no signs of infiltration, phlebitis or damage

## 2024-10-02 NOTE — BH CONSULTATION LIAISON PROGRESS NOTE - NSBHMSEATTEN_PSY_A_CORE
normal appearance , without tenderness upon palpation , no deformities , trachea midline , Thyroid normal size , no thyroid nodules , no masses , no JVD , thyroid nontender , normal appearance , without tenderness upon palpation , no deformities , trachea midline , Thyroid normal size , no thyroid nodules , no masses , no JVD , thyroid nontender Normal

## 2024-10-02 NOTE — PROVIDER CONTACT NOTE (OTHER) - ACTION/TREATMENT ORDERED:
Henry Cordoba made aware; Stated patient must have IV, educated patient on the importance of IV; Pt aware, however, patient still refusing IV reinsertion; Safety maintained

## 2024-10-02 NOTE — PROGRESS NOTE ADULT - SUBJECTIVE AND OBJECTIVE BOX
PROGRESS NOTE:     Patient is a 73y old  Female who presents with a chief complaint of AMS (01 Oct 2024 13:50)      SUBJECTIVE / OVERNIGHT EVENTS: no acute events overnight    ADDITIONAL REVIEW OF SYSTEMS:    MEDICATIONS  (STANDING):  chlorhexidine 2% Cloths 1 Application(s) Topical <User Schedule>  cholecalciferol 1000 Unit(s) Oral daily  dextrose 5%. 1000 milliLiter(s) (50 mL/Hr) IV Continuous <Continuous>  dextrose 5%. 1000 milliLiter(s) (100 mL/Hr) IV Continuous <Continuous>  dextrose 50% Injectable 25 Gram(s) IV Push once  dextrose 50% Injectable 12.5 Gram(s) IV Push once  dextrose 50% Injectable 25 Gram(s) IV Push once  divalproex Sprinkle 1000 milliGRAM(s) Oral two times a day  enoxaparin Injectable 40 milliGRAM(s) SubCutaneous every 24 hours  fluticasone propionate 50 MICROgram(s)/spray Nasal Spray 1 Spray(s) Both Nostrils two times a day  glucagon  Injectable 1 milliGRAM(s) IntraMuscular once  insulin lispro (ADMELOG) corrective regimen sliding scale   SubCutaneous at bedtime  insulin lispro (ADMELOG) corrective regimen sliding scale   SubCutaneous three times a day before meals  levothyroxine 75 MICROGram(s) Oral daily  levothyroxine      melatonin 6 milliGRAM(s) Oral at bedtime  metoprolol succinate ER 50 milliGRAM(s) Oral daily  multivitamin 1 Tablet(s) Oral daily  QUEtiapine 50 milliGRAM(s) Oral daily  QUEtiapine 50 milliGRAM(s) Oral at bedtime  senna 2 Tablet(s) Oral at bedtime    MEDICATIONS  (PRN):  acetaminophen     Tablet .. 650 milliGRAM(s) Oral every 6 hours PRN Temp greater or equal to 38C (100.4F), Mild Pain (1 - 3)  albuterol    90 MICROgram(s) HFA Inhaler 2 Puff(s) Inhalation every 6 hours PRN Shortness of Breath and/or Wheezing  aluminum hydroxide/magnesium hydroxide/simethicone Suspension 30 milliLiter(s) Oral every 4 hours PRN Dyspepsia  bisacodyl 5 milliGRAM(s) Oral every 12 hours PRN Constipation  dextrose Oral Gel 15 Gram(s) Oral once PRN Blood Glucose LESS THAN 70 milliGRAM(s)/deciliter  simethicone 80 milliGRAM(s) Chew three times a day PRN Gas      CAPILLARY BLOOD GLUCOSE      POCT Blood Glucose.: 157 mg/dL (02 Oct 2024 17:56)  POCT Blood Glucose.: 118 mg/dL (02 Oct 2024 12:16)  POCT Blood Glucose.: 116 mg/dL (02 Oct 2024 08:53)  POCT Blood Glucose.: 140 mg/dL (01 Oct 2024 22:37)    I&O's Summary      PHYSICAL EXAM:  Vital Signs Last 24 Hrs  T(C): 36.3 (02 Oct 2024 17:08), Max: 36.4 (02 Oct 2024 08:56)  T(F): 97.4 (02 Oct 2024 17:08), Max: 97.6 (02 Oct 2024 08:56)  HR: 66 (02 Oct 2024 17:08) (57 - 66)  BP: 99/76 (02 Oct 2024 17:08) (99/76 - 148/89)  BP(mean): 80 (02 Oct 2024 17:08) (79 - 80)  RR: 16 (02 Oct 2024 17:08) (16 - 18)  SpO2: 100% (02 Oct 2024 17:08) (99% - 100%)    Parameters below as of 02 Oct 2024 17:08  Patient On (Oxygen Delivery Method): room air        CONSTITUTIONAL: NAD  RESPIRATORY: Normal respiratory effort; lungs are clear to auscultation bilaterally  CARDIOVASCULAR: Regular rate and rhythm, normal S1 and S2, no murmur/rub/gallop  ABDOMEN: Nontender to palpation, normoactive bowel sounds, no rebound/guarding;   PSYCH: A+O to person, place, and time; affect appropriate    LABS:                        12.0   3.99  )-----------( 165      ( 01 Oct 2024 06:00 )             37.4     10-01    141  |  105  |  14  ----------------------------<  73  4.5   |  24  |  0.70    Ca    8.8      01 Oct 2024 06:00  Phos  3.4     10-01  Mg     1.80     10-01            Urinalysis Basic - ( 01 Oct 2024 06:00 )    Color: x / Appearance: x / SG: x / pH: x  Gluc: 73 mg/dL / Ketone: x  / Bili: x / Urobili: x   Blood: x / Protein: x / Nitrite: x   Leuk Esterase: x / RBC: x / WBC x   Sq Epi: x / Non Sq Epi: x / Bacteria: x          RADIOLOGY & ADDITIONAL TESTS:  Results Reviewed:   Imaging Personally Reviewed:  Electrocardiogram Personally Reviewed:    COORDINATION OF CARE:  Care Discussed with Consultants/Other Providers [Y/N]:  Prior or Outpatient Records Reviewed [Y/N]:

## 2024-10-02 NOTE — PROGRESS NOTE ADULT - PROBLEM SELECTOR PLAN 2
- admission at [facility] since May for it  - on seroquel 50mg qhs and 50mg daily per last meds from chart transfer note  - c/w psych meds  - delirium precautions  - was on fluphanzine prn for agitation there, will monitor for now and tx symptomatically for agitation based on team assessment  - c/w melatonin qhs

## 2024-10-02 NOTE — PROGRESS NOTE ADULT - PROBLEM SELECTOR PLAN 1
- appears at baseline  - events leading to transfer from [facility] noted and stated as above, now improving back to aaox3 and following commands; sp stroke code with cta, cth, and ct perfusion neg for acute pathology  - neuro consulted; recs appreciated  - EEG without seizure activity  - MRI brain remains pending   - toxic metabolic encepaholopathy ; infectious w/u thus far negative  - echo unremarkable  - c/w valproic acid sprinkles 750mg BID    #dilated pupil  -baseline anisocoria (OD 8mm, OS 1mm) per chart review

## 2024-10-02 NOTE — BH CONSULTATION LIAISON PROGRESS NOTE - NSBHFUPINTERVALHXFT_PSY_A_CORE
Pt seen at bedside. Pt complaining about not getting orange juice this morning. Thinks that staff are intentionally messing with her diet and making her worse. States that she needs to get certain foods to avoid getting bacteria. Denies SI/HI/AVH.

## 2024-10-02 NOTE — PROVIDER CONTACT NOTE (OTHER) - ASSESSMENT
Pt requested IV be removed and maybe try to get another one in the AM; IV had no signs of infiltration, phlebitis or damage; No distress or pain at this time; Call bell in reach; Safety maintained

## 2024-10-03 LAB — B BURGDOR DNA SPEC QL NAA+PROBE: NEGATIVE — SIGNIFICANT CHANGE UP

## 2024-10-03 PROCEDURE — 99232 SBSQ HOSP IP/OBS MODERATE 35: CPT

## 2024-10-03 RX ORDER — OLANZAPINE 20 MG/1
1.25 TABLET ORAL ONCE
Refills: 0 | Status: COMPLETED | OUTPATIENT
Start: 2024-10-03 | End: 2024-10-03

## 2024-10-03 RX ORDER — OLANZAPINE 20 MG/1
1.25 TABLET ORAL ONCE
Refills: 0 | Status: DISCONTINUED | OUTPATIENT
Start: 2024-10-03 | End: 2024-10-03

## 2024-10-03 RX ORDER — OLANZAPINE 20 MG/1
1.25 TABLET ORAL EVERY 6 HOURS
Refills: 0 | Status: DISCONTINUED | OUTPATIENT
Start: 2024-10-03 | End: 2024-10-14

## 2024-10-03 RX ADMIN — OLANZAPINE 1.25 MILLIGRAM(S): 20 TABLET ORAL at 21:05

## 2024-10-03 RX ADMIN — OLANZAPINE 1.25 MILLIGRAM(S): 20 TABLET ORAL at 10:28

## 2024-10-03 RX ADMIN — OLANZAPINE 1.25 MILLIGRAM(S): 20 TABLET ORAL at 21:39

## 2024-10-03 NOTE — BH CONSULTATION LIAISON PROGRESS NOTE - NSBHASSESSMENTFT_PSY_ALL_CORE
Ms Azul is a 72 yo F w history of SCZ, Parkinson's dz, hypothyroidism, and multiple psych hospitalizations who presents for AMS. Pt transferred from [facility], where she was found to have fallen and was covered in urine. Pt has been hospitalized in [facility] with occasional stays at [facility] for medical concerns since the start of this year for psychosis. Per [facility] notes, pt appears to be awaiting transfer to [facility] hospital. Pt has been delusional at [facility] with recent delusions surrounding pregnancy. Current medications are Depakote 750 mg bid, Seroquel 50 mg bid, and Prolixin Dec 12.5 mg IM q2w (last dose 9/24). Psychiatry was consulted for continued psychiatric care.    9/30: Pt continues to be paranoid but no agitation noted. EEG done; no seizures recorded. Pending ongoing medical work up.  10/1: Pt continues to be paranoid; no prns required. Ongoing medial work up; once done plan to return to [facility].  10/2: No change today. Once optimized, will return to [facility]. 2PC completed.  10/3: No changes. Continue meds as is.    Recs:  - Continue Depakote 750 mg bid. Check VA level tomorrow before AM dose of Depakote, along with CMP and ammonia  - Continue Seroquel 50 mg bid  	- hold if QTC > 500  - Next Prolixin Dec 12.5 mg IM q2w due 10/8/24  - consider Zyprexa 1.25 mg po/IM q6h prn for agitation if warranted; hold for QTC > 500    - Obs: defer to primary team, no current indications from psych standpoint  - Dispo: TBD, likely return to [facility] once medically optimized; 2PC completed and in the chart 10/2/24 Ms Azul is a 72 yo F w history of SCZ, Parkinson's dz, hypothyroidism, and multiple psych hospitalizations who presents for AMS. Pt transferred from [facility], where she was found to have fallen and was covered in urine. Pt has been hospitalized in [facility] with occasional stays at [facility] for medical concerns since the start of this year for psychosis. Per [facility] notes, pt appears to be awaiting transfer to [facility] hospital. Pt has been delusional at [facility] with recent delusions surrounding pregnancy. Current medications are Depakote 750 mg bid, Seroquel 50 mg bid, and Prolixin Dec 12.5 mg IM q2w (last dose 9/24). Psychiatry was consulted for continued psychiatric care.    9/30: Pt continues to be paranoid but no agitation noted. EEG done; no seizures recorded. Pending ongoing medical work up.  10/1: Pt continues to be paranoid; no prns required. Ongoing medial work up; once done plan to return to [facility].  10/2: No change today. Once optimized, will return to [facility]. 2PC completed.  10/3: No changes. Continue meds as is.    Recs:  - Continue Depakote 750 mg bid. Check VA level tomorrow before AM dose of Depakote, along with CMP and ammonia  - Continue Seroquel 50 mg bid  	- hold if QTC > 500  - Next Prolixin Dec 12.5 mg IM q2w due 10/8/24  - consider Zyprexa 1.25 mg po/IM q6h prn for agitation if warranted; hold for QTC > 500    - Obs: defer to primary team, no current indications from psych standpoint  - Dispo: TBD,  return to [facility] once medically optimized; 2PC completed and in the chart 10/2/24

## 2024-10-03 NOTE — CHART NOTE - NSCHARTNOTEFT_GEN_A_CORE
Pt refused MRI twice despite being premedicated with Zyprexa. Spoke w/ Neurologist - MRi brain not needed , pt cleared from their end for discharge. Recommended to c/w Depakote 1g BID.  Spoke w/ Sheila CHAMBERS to inform her pt is medically cleared for discharge - no migue bed available today however she will f/u in AM to see if bed becomes available. Pt refused MRI twice despite being premedicated with Zyprexa. Spoke w/ Neurologist - MRi brain not needed , pt cleared from their end for discharge. Recommended to c/w Depakote 1g BID.  Spoke w/ Sheila [facility] to inform her pt is medically cleared for discharge - no migue bed available today however she will f/u in AM to see if bed becomes available.  Sister Natali Mcneil, [phone] updated. Requested for pt to be moved to a different floor as she was not happy on 2s- Sheila made aware.

## 2024-10-03 NOTE — PROGRESS NOTE ADULT - SUBJECTIVE AND OBJECTIVE BOX
PROGRESS NOTE:     Patient is a 73y old  Female who presents with a chief complaint of AMS (02 Oct 2024 19:34)      SUBJECTIVE / OVERNIGHT EVENTS: pt did not complete the MRI    ADDITIONAL REVIEW OF SYSTEMS:    MEDICATIONS  (STANDING):  chlorhexidine 2% Cloths 1 Application(s) Topical <User Schedule>  cholecalciferol 1000 Unit(s) Oral daily  dextrose 5%. 1000 milliLiter(s) (50 mL/Hr) IV Continuous <Continuous>  dextrose 5%. 1000 milliLiter(s) (100 mL/Hr) IV Continuous <Continuous>  dextrose 50% Injectable 25 Gram(s) IV Push once  dextrose 50% Injectable 12.5 Gram(s) IV Push once  dextrose 50% Injectable 25 Gram(s) IV Push once  divalproex Sprinkle 1000 milliGRAM(s) Oral two times a day  enoxaparin Injectable 40 milliGRAM(s) SubCutaneous every 24 hours  fluticasone propionate 50 MICROgram(s)/spray Nasal Spray 1 Spray(s) Both Nostrils two times a day  glucagon  Injectable 1 milliGRAM(s) IntraMuscular once  insulin lispro (ADMELOG) corrective regimen sliding scale   SubCutaneous three times a day before meals  insulin lispro (ADMELOG) corrective regimen sliding scale   SubCutaneous at bedtime  levothyroxine 75 MICROGram(s) Oral daily  levothyroxine      melatonin 6 milliGRAM(s) Oral at bedtime  metoprolol succinate ER 50 milliGRAM(s) Oral daily  multivitamin 1 Tablet(s) Oral daily  OLANZapine Injectable 1.25 milliGRAM(s) IntraMuscular once  QUEtiapine 50 milliGRAM(s) Oral daily  QUEtiapine 50 milliGRAM(s) Oral at bedtime  senna 2 Tablet(s) Oral at bedtime    MEDICATIONS  (PRN):  acetaminophen     Tablet .. 650 milliGRAM(s) Oral every 6 hours PRN Temp greater or equal to 38C (100.4F), Mild Pain (1 - 3)  albuterol    90 MICROgram(s) HFA Inhaler 2 Puff(s) Inhalation every 6 hours PRN Shortness of Breath and/or Wheezing  aluminum hydroxide/magnesium hydroxide/simethicone Suspension 30 milliLiter(s) Oral every 4 hours PRN Dyspepsia  bisacodyl 5 milliGRAM(s) Oral every 12 hours PRN Constipation  dextrose Oral Gel 15 Gram(s) Oral once PRN Blood Glucose LESS THAN 70 milliGRAM(s)/deciliter  OLANZapine Injectable 1.25 milliGRAM(s) IntraMuscular every 6 hours PRN agitation  simethicone 80 milliGRAM(s) Chew three times a day PRN Gas      CAPILLARY BLOOD GLUCOSE      POCT Blood Glucose.: 98 mg/dL (03 Oct 2024 05:23)  POCT Blood Glucose.: 169 mg/dL (02 Oct 2024 23:56)  POCT Blood Glucose.: 157 mg/dL (02 Oct 2024 17:56)    I&O's Summary      PHYSICAL EXAM:  Vital Signs Last 24 Hrs  T(C): 36.4 (03 Oct 2024 04:00), Max: 36.6 (02 Oct 2024 23:00)  T(F): 97.6 (03 Oct 2024 04:00), Max: 97.9 (02 Oct 2024 23:00)  HR: 60 (03 Oct 2024 04:00) (60 - 72)  BP: 143/80 (03 Oct 2024 04:00) (99/76 - 143/80)  BP(mean): 80 (02 Oct 2024 17:08) (80 - 80)  RR: 18 (03 Oct 2024 04:00) (16 - 18)  SpO2: 100% (03 Oct 2024 04:00) (100% - 100%)    Parameters below as of 03 Oct 2024 04:00  Patient On (Oxygen Delivery Method): room air        pt declining physical exam; requesting that I leave the room    LABS:                      RADIOLOGY & ADDITIONAL TESTS:  Results Reviewed:   Imaging Personally Reviewed:  Electrocardiogram Personally Reviewed:    COORDINATION OF CARE:  Care Discussed with Consultants/Other Providers [Y/N]:  Prior or Outpatient Records Reviewed [Y/N]:

## 2024-10-03 NOTE — PROGRESS NOTE ADULT - PROBLEM SELECTOR PLAN 2
- admission at [facility] since May for it  - on seroquel 50mg qhs and 50mg daily per last meds from chart transfer note  - c/w psych meds  - delirium precautions  - was on fluphanzine prn for agitation there, will monitor for now and tx symptomatically for agitation based on team assessment  - c/w melatonin qhs

## 2024-10-03 NOTE — BH CONSULTATION LIAISON PROGRESS NOTE - NSBHFUPINTERVALHXFT_PSY_A_CORE
Pt seen at bedside. States that she had to get her breakfast herself from the cafeteria. Upset that her milk carton is bent out of shape and not getting orange juice. Insisting on getting orange juice or she does not want to talk with me. States that staff are messing with her and only the bible can help her. Once she identified me as a psychiatrist, she no longer wanted to talk with me but continued to insist that I get her orange juice.

## 2024-10-03 NOTE — PROGRESS NOTE ADULT - PROBLEM SELECTOR PLAN 1
- appears at baseline  - events leading to transfer from [facility] noted and stated as above, now improving back to aaox3 and following commands; sp stroke code with cta, cth, and ct perfusion neg for acute pathology  - neuro consulted; recs appreciated  - EEG without seizure activity  - toxic metabolic encepaholopathy ; infectious w/u thus far negative  - echo unremarkable  - c/w valproic acid sprinkles 750mg BID  - pt declined MRI, neurology does not need this to be completed and has low suspicion for CVA      #dilated pupil  -baseline anisocoria (OD 8mm, OS 1mm) per chart review

## 2024-10-04 LAB
ALBUMIN SERPL ELPH-MCNC: 3.2 G/DL — LOW (ref 3.3–5)
ALP SERPL-CCNC: 68 U/L — SIGNIFICANT CHANGE UP (ref 40–120)
ALT FLD-CCNC: 19 U/L — SIGNIFICANT CHANGE UP (ref 4–33)
AMMONIA BLD-MCNC: 41 UMOL/L — SIGNIFICANT CHANGE UP (ref 11–55)
ANION GAP SERPL CALC-SCNC: 11 MMOL/L — SIGNIFICANT CHANGE UP (ref 7–14)
AST SERPL-CCNC: 26 U/L — SIGNIFICANT CHANGE UP (ref 4–32)
BASOPHILS # BLD AUTO: 0.03 K/UL — SIGNIFICANT CHANGE UP (ref 0–0.2)
BASOPHILS NFR BLD AUTO: 0.7 % — SIGNIFICANT CHANGE UP (ref 0–2)
BILIRUB SERPL-MCNC: <0.2 MG/DL — SIGNIFICANT CHANGE UP (ref 0.2–1.2)
BUN SERPL-MCNC: 14 MG/DL — SIGNIFICANT CHANGE UP (ref 7–23)
CALCIUM SERPL-MCNC: 9.3 MG/DL — SIGNIFICANT CHANGE UP (ref 8.4–10.5)
CHLORIDE SERPL-SCNC: 106 MMOL/L — SIGNIFICANT CHANGE UP (ref 98–107)
CO2 SERPL-SCNC: 25 MMOL/L — SIGNIFICANT CHANGE UP (ref 22–31)
CREAT SERPL-MCNC: 0.76 MG/DL — SIGNIFICANT CHANGE UP (ref 0.5–1.3)
EGFR: 84 ML/MIN/1.73M2 — SIGNIFICANT CHANGE UP
EOSINOPHIL # BLD AUTO: 0.19 K/UL — SIGNIFICANT CHANGE UP (ref 0–0.5)
EOSINOPHIL NFR BLD AUTO: 4.3 % — SIGNIFICANT CHANGE UP (ref 0–6)
GLUCOSE SERPL-MCNC: 133 MG/DL — HIGH (ref 70–99)
HCT VFR BLD CALC: 37.5 % — SIGNIFICANT CHANGE UP (ref 34.5–45)
HGB BLD-MCNC: 11.8 G/DL — SIGNIFICANT CHANGE UP (ref 11.5–15.5)
IANC: 2.12 K/UL — SIGNIFICANT CHANGE UP (ref 1.8–7.4)
IMM GRANULOCYTES NFR BLD AUTO: 0.5 % — SIGNIFICANT CHANGE UP (ref 0–0.9)
LYMPHOCYTES # BLD AUTO: 1.41 K/UL — SIGNIFICANT CHANGE UP (ref 1–3.3)
LYMPHOCYTES # BLD AUTO: 32.3 % — SIGNIFICANT CHANGE UP (ref 13–44)
MAGNESIUM SERPL-MCNC: 1.6 MG/DL — SIGNIFICANT CHANGE UP (ref 1.6–2.6)
MCHC RBC-ENTMCNC: 26.4 PG — LOW (ref 27–34)
MCHC RBC-ENTMCNC: 31.5 GM/DL — LOW (ref 32–36)
MCV RBC AUTO: 83.9 FL — SIGNIFICANT CHANGE UP (ref 80–100)
MONOCYTES # BLD AUTO: 0.6 K/UL — SIGNIFICANT CHANGE UP (ref 0–0.9)
MONOCYTES NFR BLD AUTO: 13.7 % — SIGNIFICANT CHANGE UP (ref 2–14)
NEUTROPHILS # BLD AUTO: 2.12 K/UL — SIGNIFICANT CHANGE UP (ref 1.8–7.4)
NEUTROPHILS NFR BLD AUTO: 48.5 % — SIGNIFICANT CHANGE UP (ref 43–77)
NRBC # BLD: 0 /100 WBCS — SIGNIFICANT CHANGE UP (ref 0–0)
NRBC # FLD: 0 K/UL — SIGNIFICANT CHANGE UP (ref 0–0)
PHOSPHATE SERPL-MCNC: 3.5 MG/DL — SIGNIFICANT CHANGE UP (ref 2.5–4.5)
PLATELET # BLD AUTO: 149 K/UL — LOW (ref 150–400)
POTASSIUM SERPL-MCNC: 4.1 MMOL/L — SIGNIFICANT CHANGE UP (ref 3.5–5.3)
POTASSIUM SERPL-SCNC: 4.1 MMOL/L — SIGNIFICANT CHANGE UP (ref 3.5–5.3)
PROT SERPL-MCNC: 6.6 G/DL — SIGNIFICANT CHANGE UP (ref 6–8.3)
RBC # BLD: 4.47 M/UL — SIGNIFICANT CHANGE UP (ref 3.8–5.2)
RBC # FLD: 15.5 % — HIGH (ref 10.3–14.5)
SARS-COV-2 RNA SPEC QL NAA+PROBE: SIGNIFICANT CHANGE UP
SODIUM SERPL-SCNC: 142 MMOL/L — SIGNIFICANT CHANGE UP (ref 135–145)
VALPROATE SERPL-MCNC: 17.3 UG/ML — LOW (ref 50–100)
VIT B1 SERPL-MCNC: 180.8 NMOL/L — SIGNIFICANT CHANGE UP (ref 66.5–200)
WBC # BLD: 4.37 K/UL — SIGNIFICANT CHANGE UP (ref 3.8–10.5)
WBC # FLD AUTO: 4.37 K/UL — SIGNIFICANT CHANGE UP (ref 3.8–10.5)

## 2024-10-04 PROCEDURE — 99232 SBSQ HOSP IP/OBS MODERATE 35: CPT

## 2024-10-04 RX ADMIN — Medication 75 MICROGRAM(S): at 14:01

## 2024-10-04 RX ADMIN — QUETIAPINE FUMARATE 50 MILLIGRAM(S): 200 TABLET ORAL at 21:12

## 2024-10-04 RX ADMIN — DIVALPROEX SODIUM 1000 MILLIGRAM(S): 250 TABLET, FILM COATED, EXTENDED RELEASE ORAL at 06:43

## 2024-10-04 RX ADMIN — QUETIAPINE FUMARATE 50 MILLIGRAM(S): 200 TABLET ORAL at 13:23

## 2024-10-04 RX ADMIN — Medication 6 MILLIGRAM(S): at 21:08

## 2024-10-04 RX ADMIN — QUETIAPINE FUMARATE 50 MILLIGRAM(S): 200 TABLET ORAL at 01:06

## 2024-10-04 RX ADMIN — DIVALPROEX SODIUM 1000 MILLIGRAM(S): 250 TABLET, FILM COATED, EXTENDED RELEASE ORAL at 18:49

## 2024-10-04 RX ADMIN — Medication 1 TABLET(S): at 13:24

## 2024-10-04 RX ADMIN — Medication 1000 UNIT(S): at 13:24

## 2024-10-04 RX ADMIN — Medication 50 MILLIGRAM(S): at 06:38

## 2024-10-04 RX ADMIN — Medication 6 MILLIGRAM(S): at 01:06

## 2024-10-04 NOTE — PROGRESS NOTE ADULT - PROBLEM SELECTOR PLAN 1
- appears at baseline  - events leading to transfer from [facility] noted and stated as above, now improving back to aaox3 and following commands; sp stroke code with cta, cth, and ct perfusion neg for acute pathology  - neuro consulted; recs appreciated  - EEG without seizure activity  - toxic metabolic encepaholopathy ; infectious w/u thus far negative  - echo unremarkable  - c/w valproic acid sprinkles 750mg BID  - pt declined MRI, neurology does not need this to be completed and has low suspicion for CVA  -awaiting bed at [facility]  #dilated pupil  -baseline anisocoria (OD 8mm, OS 1mm) per chart review

## 2024-10-04 NOTE — DISCHARGE NOTE PROVIDER - NSFOLLOWUPCLINICS_GEN_ALL_ED_FT
[facility]  Psychiatry  [address] [place] [address]  Phone: [phone]  Fax:

## 2024-10-04 NOTE — DISCHARGE NOTE PROVIDER - NSDCMRMEDTOKEN_GEN_ALL_CORE_FT
albuterol 90 mcg/inh inhalation aerosol: 2 puff(s) inhaled every 6 hours As needed Shortness of Breath and/or Wheezing  cholecalciferol 400 intl units (10 mcg) oral tablet: 1 tab(s) orally once a day  Depakote Sprinkles 125 mg oral delayed release capsule: 6 cap(s) orally twice a day after breakfast and dinner  divalproex sodium 125 mg oral delayed release capsule: 6 cap(s) orally 2 times a day  fluticasone 50 mcg/inh nasal spray: 1 spray(s) in each nostril twice  lactulose 10 g/15 mL oral syrup: 15 milliliter(s) orally once a day  levothyroxine 75 mcg (0.075 mg) oral capsule: 1 cap(s) orally once a day  melatonin 5 mg oral capsule: 1 cap(s) orally once a day (at bedtime)  metFORMIN 1000 mg oral tablet: 1 tab(s) orally 2 times a day  metoprolol succinate 50 mg oral tablet, extended release: 1 tab(s) orally once a day  Multiple Vitamins oral tablet: 1 tab(s) orally once a day  multivitamin: 1 cap(s) orally once a day (at bedtime)  QUEtiapine: 25 milligram(s) orally once a day  QUEtiapine 50 mg oral tablet: 1 tab(s) orally once a day (at bedtime)  senna leaf extract oral tablet: 2 tab(s) orally once a day (at bedtime)   acetaminophen 325 mg oral tablet: 2 tab(s) orally every 6 hours As needed Temp greater or equal to 38C (100.4F), Mild Pain (1 - 3)  albuterol 90 mcg/inh inhalation aerosol: 2 puff(s) inhaled every 6 hours As needed Shortness of Breath and/or Wheezing  cholecalciferol 400 intl units (10 mcg) oral tablet: 1 tab(s) orally once a day  divalproex sodium 125 mg oral delayed release capsule: 8 cap(s) orally 2 times a day  fluticasone 50 mcg/inh nasal spray: 1 spray(s) in each nostril twice  lactulose 10 g/15 mL oral syrup: 15 milliliter(s) orally once a day  levothyroxine 75 mcg (0.075 mg) oral capsule: 1 cap(s) orally once a day  melatonin 5 mg oral capsule: 1 cap(s) orally once a day (at bedtime)  metFORMIN 1000 mg oral tablet: 1 tab(s) orally 2 times a day  metoprolol succinate 50 mg oral tablet, extended release: 1 tab(s) orally once a day  Multiple Vitamins oral tablet: 1 tab(s) orally once a day  QUEtiapine 25 mg oral tablet: 3 tab(s) orally once a day (at bedtime)  QUEtiapine 25 mg oral tablet: 3 tab(s) orally once a day  senna leaf extract oral tablet: 2 tab(s) orally once a day (at bedtime)   acetaminophen 325 mg oral tablet: 2 tab(s) orally every 6 hours As needed Temp greater or equal to 38C (100.4F), Mild Pain (1 - 3)  albuterol 90 mcg/inh inhalation aerosol: 2 puff(s) inhaled every 6 hours As needed Shortness of Breath and/or Wheezing  cholecalciferol 400 intl units (10 mcg) oral tablet: 1 tab(s) orally once a day  divalproex sodium 125 mg oral delayed release capsule: 8 cap(s) orally 2 times a day  lactulose 10 g/15 mL oral syrup: 15 milliliter(s) orally once a day  levothyroxine 75 mcg (0.075 mg) oral capsule: 1 cap(s) orally once a day  melatonin 5 mg oral capsule: 1 cap(s) orally once a day (at bedtime)  metFORMIN 1000 mg oral tablet: 1 tab(s) orally 2 times a day  metoprolol succinate 50 mg oral tablet, extended release: 1 tab(s) orally once a day  Multiple Vitamins oral tablet: 1 tab(s) orally once a day  QUEtiapine 25 mg oral tablet: 3 tab(s) orally once a day (at bedtime)  QUEtiapine 25 mg oral tablet: 3 tab(s) orally once a day  senna leaf extract oral tablet: 2 tab(s) orally once a day (at bedtime)

## 2024-10-04 NOTE — DISCHARGE NOTE PROVIDER - ATTENDING DISCHARGE PHYSICAL EXAMINATION:
CONSTITUTIONAL: NAD   EYES: conjunctiva and sclera clear  ENMT: Moist oral mucosa   NECK: Supple   RESPIRATORY: Normal respiratory effort; lungs are clear to auscultation bilaterally  CARDIOVASCULAR: would not allow further assessment with auscultation   ABDOMEN: Nontender to palpation, no rebound/guarding   MUSCULOSKELETAL: No lower extremity edema   PSYCH: knows she is in the hospital, does not answer further questions   NEUROLOGY: moves all extremities   SKIN: No rashes

## 2024-10-04 NOTE — DISCHARGE NOTE PROVIDER - NSDCFUADDAPPT_GEN_ALL_CORE_FT
Follow up with Psychiatrist at [facility].  Follow up with Neurologist once discharged from [facility].  Thyroid Stimulating Hormone level high, free thyroxine normal - Repeat Thyroid function tests in 4 weeks.

## 2024-10-04 NOTE — DISCHARGE NOTE PROVIDER - PROVIDER RX CONTACT NUMBER
----- Message from Cady Gray PA-C sent at 6/25/2021  9:33 AM CDT -----  -Kidney function is still stable, but he has not been taking the Bumetanide (which he needs to take). Will repeat BMP after he has been back on the Bumetanide for a couple of weeks.  -Glyco is vastly improved. It is now 5.3. This is from the Lantus (and the close monitoring while he was in the hospital. Continue the same dose of Lantus for now. Monitor for signs of low blood sugar and call if they occur. Check glucose fasting each day as well as if he has low blood sugar sx's.  -WBC is elevated. Has he had fever chills sweats? How is his breathing? May need to do CXR if coughing significantly.  -Anemia is mildly improved from a month ago. This may continue to improve as his DM improves. Will check again in a month.   -Patient is due for (long) appt in a couple of weeks. We will discuss further at that time. Key now is that he is taking his Bumetanide and the Metoprolol as prescribed (he wasn't taking them when he was here the other day). Also, remind patient to bring all his med bottles in with him when he comes.  
Spoke to regarding taking his bumetanide and metoprolol.  Repeat BMP after being on medication for a couple of weeks.  Patient will monitor symptoms of low blood sugar as well as checking sugars daily.      **Patient has not had any chills, fever or sweats, no breathing issues,, no coughing (very rarely).   Has appt July 8th at 9:40am  with Cady MCFARLAND.   
[phone]

## 2024-10-04 NOTE — DISCHARGE NOTE PROVIDER - CARE PROVIDER_API CALL
Arleth Shepherd  Neurology  [address] [place] [address]  Phone: [phone]  Fax: [phone]  Follow Up Time:

## 2024-10-04 NOTE — DISCHARGE NOTE PROVIDER - EXTENDED VTE YES NO FOR MLM ENOXAPARIN
SLP orders received. Infant at increased risk for feeding difficulty secondary to gestational age and diagnosis of gestational diabetes.    ,

## 2024-10-04 NOTE — BH CONSULTATION LIAISON PROGRESS NOTE - NSBHATTESTCOMMENTATTENDFT_PSY_A_CORE
Chart reviewed, pt. seen/evaluated with Dr. Mariee, I agree with above assessment/plan. Patient minimally engaged, irritable, paranoid, no si or hi. Plan as above. Will follow

## 2024-10-04 NOTE — DISCHARGE NOTE PROVIDER - NSDCCPCAREPLAN_GEN_ALL_CORE_FT
PRINCIPAL DISCHARGE DIAGNOSIS  Diagnosis: Altered mental status  Assessment and Plan of Treatment: You were seen by Neurology and Psychiatry.  Your EEG was normal and you declined an MRI.      SECONDARY DISCHARGE DIAGNOSES  Diagnosis: Schizophrenia  Assessment and Plan of Treatment: You will return to [facility] for ongoing care.  Psychiatry reocmmends that you:  -Continue Depakote 750 mg bid.  - Continue Seroquel 50 mg bid,  hold if QTC > 500  - Take Prolixin Dec 12.5 mg IM q2w due 10/8/24  - consider Zyprexa 1.25 mg po/IM q6h prn for agitation if warranted; hold for QTC > 500     PRINCIPAL DISCHARGE DIAGNOSIS  Diagnosis: Altered mental status  Assessment and Plan of Treatment: You were seen by Neurology and Psychiatry.  Your EEG was normal and you declined an MRI.        SECONDARY DISCHARGE DIAGNOSES  Diagnosis: Schizophrenia  Assessment and Plan of Treatment: You will return to [facility] for ongoing care.  Psychiatry reocmmends that you:  - continue valproic acid sprinkles 1000 mg BID  - Continue Seroquel 75 mg bid   - Next Prolixin Dec 12.5 mg IM q2w due 10/23/24     PRINCIPAL DISCHARGE DIAGNOSIS  Diagnosis: Altered mental status  Assessment and Plan of Treatment: You were seen by Neurology and Psychiatry.  Your EEG was normal and you declined an MRI.      SECONDARY DISCHARGE DIAGNOSES  Diagnosis: Schizophrenia  Assessment and Plan of Treatment: You will return to [facility] for ongoing care.  Psychiatry reocmmends that you:  - continue valproic acid sprinkles 1000 mg BID  - Continue Seroquel 75 mg bid   - Next Prolixin Dec 12.5 mg IM q2w due 10/23/24    Diagnosis: Hypothyroidism  Assessment and Plan of Treatment: continue taking your home levothyroxine dose

## 2024-10-04 NOTE — DISCHARGE NOTE PROVIDER - HOSPITAL COURSE
HPI:  This is a 74 y/o F with PMH of recurrent major depressive disorder, htn, niddmt2, hld, parkinson's disease (not on active meds), hypothyroidism, hx of seizure disorder per chart review who presented from [facility] overnight after noted to have unwitnessed fall around 0028 hours, lying on L side, disoriented, slurring her words, episode of urinary incontinence without recollection of preceding events. Currently admitted to [facility] from May for recurrent MDD.     Pt seen at examined at bedside on 4s, EEG on going and on tele. reports being AAOx3 (name, place, year) and knows she is here because of seizure episodes. reports no pain of any kind, cp, sob, abd pain. does answer questions appropriately at times but also shows periods of confusion and delusions (which is noted on psych notes as well). was following commands, alert, and was telling about his past life and prior seizure admission at [facility] long time ago as well.     In the ED, vitals stable. stroke code called given presenting sx with CTA, CT brain perfusion, and CTH negative for acute pathology.EKG reviewed. EEG placed. On RA, VSS. Labs notable for: BG in 80s on arrival -> 53 -> 101 on recheck (possible error on low read). TSH elevated in 8s, AST 36, WBC wnl. IV valproic acid given in the ED and admitted to medicine for further workup and management.  (29 Sep 2024 16:34)    Hospital Course:  Pt declined MRI, Her EEG showed no epileptiform abnormalities recorded.  She was transferred to [facility] for ongoing management of her psychiatric diagnoses.      Important Medication Changes and Reason:  none    Active or Pending Issues Requiring Follow-up:  None    Advanced Directives:   [ ] Full code  [ ] DNR  [ ] Hospice    Discharge Diagnoses:  major depressive disorder, psychosis, htn, niddmt2, hld, parkinson's disease (not on active meds), hypothyroidism, hx of seizures       HPI:  This is a 72 y/o F with PMH of recurrent major depressive disorder, htn, niddmt2, hld, parkinson's disease (not on active meds), hypothyroidism, hx of seizure disorder per chart review who presented from [facility] overnight after noted to have unwitnessed fall around 0028 hours, lying on L side, disoriented, slurring her words, episode of urinary incontinence without recollection of preceding events. Currently admitted to [facility] from May for recurrent MDD.     Pt seen at examined at bedside on 4s, EEG on going and on tele. reports being AAOx3 (name, place, year) and knows she is here because of seizure episodes. reports no pain of any kind, cp, sob, abd pain. does answer questions appropriately at times but also shows periods of confusion and delusions (which is noted on psych notes as well). was following commands, alert, and was telling about his past life and prior seizure admission at [facility] long time ago as well.     In the ED, vitals stable. stroke code called given presenting sx with CTA, CT brain perfusion, and CTH negative for acute pathology.EKG reviewed. EEG placed. On RA, VSS. Labs notable for: BG in 80s on arrival -> 53 -> 101 on recheck (possible error on low read). TSH elevated in 8s, AST 36, WBC wnl. IV valproic acid given in the ED and admitted to medicine for further workup and management.  (29 Sep 2024 16:34)    Hospital Course:    - s/p stroke code with cta, cth, and ct perfusion neg for acute pathology  - neuro consulted; recs appreciated; EEG without seizure activity  - pt declined MRI, per neurology does not need this to be completed and has low suspicion for CVA  - toxic metabolic encephalopathy ; infectious w/u negative; echo unremarkable  - continue valproic acid sprinkles 1000 mg BID  - Continue Seroquel 75 mg bid   - Next Prolixin Dec 12.5 mg IM q2w due 10/23/24  - She was transferred to [facility] for ongoing management of her psychiatric diagnoses.      Advanced Directives:   [ ] Full code  [ ] DNR  [ ] Hospice    Discharge Diagnoses:  major depressive disorder, psychosis, htn, niddmt2, hld, parkinson's disease (not on active meds), hypothyroidism, hx of seizures       HPI:  This is a 72 y/o F with PMH of recurrent major depressive disorder, htn, niddmt2, hld, parkinson's disease (not on active meds), hypothyroidism, hx of seizure disorder per chart review who presented from [facility] overnight after noted to have unwitnessed fall around 0028 hours, lying on L side, disoriented, slurring her words, episode of urinary incontinence without recollection of preceding events. Currently admitted to [facility] from May for recurrent MDD.     Pt seen at examined at bedside on 4s, EEG on going and on tele. reports being AAOx3 (name, place, year) and knows she is here because of seizure episodes. reports no pain of any kind, cp, sob, abd pain. does answer questions appropriately at times but also shows periods of confusion and delusions (which is noted on psych notes as well). was following commands, alert, and was telling about his past life and prior seizure admission at [facility] long time ago as well.     In the ED, vitals stable. stroke code called given presenting sx with CTA, CT brain perfusion, and CTH negative for acute pathology.EKG reviewed. EEG placed. On RA, VSS. Labs notable for: BG in 80s on arrival -> 53 -> 101 on recheck (possible error on low read). TSH elevated in 8s, AST 36, WBC wnl. IV valproic acid given in the ED and admitted to medicine for further workup and management.  (29 Sep 2024 16:34)    Hospital Course:    - s/p stroke code with cta, cth, and ct perfusion neg for acute pathology  - neuro consulted; recs appreciated; EEG without seizure activity  - pt declined MRI, per neurology does not need this to be completed and has low suspicion for CVA  - toxic metabolic encephalopathy ; infectious w/u negative; echo unremarkable  - psych consult appreciated for behavioral management:   - continue valproic acid sprinkles 1000 mg BID  - Continue Seroquel 75 mg bid   - Next Prolixin Dec 12.5 mg IM q2w due 10/23/24  - She was transferred to [facility] for ongoing management of her psychiatric diagnoses.      Advanced Directives:   [ X] Full code  [ ] DNR  [ ] Hospice    Discharge Diagnoses:  major depressive disorder, psychosis, htn, niddmt2, hld, parkinson's disease (not on active meds), hypothyroidism, hx of seizures

## 2024-10-04 NOTE — BH CONSULTATION LIAISON PROGRESS NOTE - NSBHFUPINTERVALHXFT_PSY_A_CORE
Pt seen at bedside. Required prn meds and restraints yesterday. Reports that the nurse yesterday was trying to hurt her: states she was reaching into her pocket and trying to pull a gun out and that she was trying to poison her. States that the reason she was in restraints yesterday was because nurse was being mean. Happy that she got orange juice today.  Pt seen at bedside. Required prn meds and restraints yesterday. Reports that the staff yesterday was trying to hurt her: states she was reaching into her pocket and trying to pull a gun out and that they were trying to poison her. States that the reason she was in restraints yesterday was because staff was being mean. Happy that she got orange juice today. No si or hi.

## 2024-10-04 NOTE — BH CONSULTATION LIAISON PROGRESS NOTE - NSBHASSESSMENTFT_PSY_ALL_CORE
Ms Azul is a 72 yo F w history of SCZ, Parkinson's dz, hypothyroidism, and multiple psych hospitalizations who presents for AMS. Pt transferred from [facility], where she was found to have fallen and was covered in urine. Pt has been hospitalized in [facility] with occasional stays at [facility] for medical concerns since the start of this year for psychosis. Per [facility] notes, pt appears to be awaiting transfer to [facility] hospital. Pt has been delusional at [facility] with recent delusions surrounding pregnancy. Current medications are Depakote 750 mg bid, Seroquel 50 mg bid, and Prolixin Dec 12.5 mg IM q2w (last dose 9/24). Psychiatry was consulted for continued psychiatric care.    9/30: Pt continues to be paranoid but no agitation noted. EEG done; no seizures recorded. Pending ongoing medical work up.  10/1: Pt continues to be paranoid; no prns required. Ongoing medial work up; once done plan to return to [facility].  10/2: No change today. Once optimized, will return to [facility]. 2PC completed.  10/3: No changes. Continue meds as is.  10/4: Agitation yesterday requiring prn and restraints. Okay today but continues to be paranoid. Pending transfer to [facility]. Low VPA level but missed dose yesterday; not accurate level.    Recs:  - Continue Depakote 750 mg bid  - Continue Seroquel 50 mg bid  	- hold if QTC > 500  - Next Prolixin Dec 12.5 mg IM q2w due 10/8/24  - consider Zyprexa 1.25 mg po/IM q6h prn for agitation if warranted; hold for QTC > 500    - Obs: defer to primary team, no current indications from psych standpoint  - Dispo: TBD,  return to [facility] once medically optimized; 2PC completed and in the chart 10/2/24 Ms Azul is a 74 yo F w history of SCZ, Parkinson's dz, hypothyroidism, and multiple psych hospitalizations who presents for AMS. Pt transferred from [facility], where she was found to have fallen and was covered in urine. Pt has been hospitalized in [facility] with occasional stays at [facility] for medical concerns since the start of this year for psychosis. Per [facility] notes, pt appears to be awaiting transfer to [facility] hospital. Pt has been delusional at [facility] with recent delusions surrounding pregnancy. Current medications are Depakote 750 mg bid, Seroquel 50 mg bid, and Prolixin Dec 12.5 mg IM q2w (last dose 9/24). Psychiatry was consulted for continued psychiatric care.    9/30: Pt continues to be paranoid but no agitation noted. EEG done; no seizures recorded. Pending ongoing medical work up.  10/1: Pt continues to be paranoid; no prns required. Ongoing medial work up; once done plan to return to [facility].  10/2: No change today. Once optimized, will return to [facility]. 2PC completed.  10/3: No changes. Continue meds as is.  10/4: Agitation yesterday requiring prn and restraints. Okay today but continues to be paranoid. Pending transfer to [facility]. Low VPA level but missed dose yesterday; not accurate level.    Recs:  - Continue Depakote 750 mg bid  - Continue Seroquel 50 mg bid  	- hold if QTC > 500  - Next Prolixin Dec 12.5 mg IM q2w due 10/8/24  - consider Zyprexa 1.25 mg po/IM q6h prn for agitation if warranted; may give additional Zyprexa 1.25 mg if first dose is ineffective. hold for QTC > 500  -Continue to monitor VPA level   - Obs: started on 1:1 by team, recommend to  continue CO 1:1 for impulsivity/recent agitation  - Dispo: TBD,  return to [facility] once medically optimized; 2PC completed and in the chart 10/2/24

## 2024-10-04 NOTE — PROGRESS NOTE ADULT - PROBLEM SELECTOR PLAN 2
- admission at [facility] since May for it  - on seroquel 50mg qhs and 50mg daily per last meds from chart transfer note  - c/w psych meds  - delirium precautions  - was on fluphanzine prn for agitation there, will monitor for now and tx symptomatically for agitation based on team assessment  - c/w melatonin qhs  -to return to [facility]

## 2024-10-04 NOTE — PROGRESS NOTE ADULT - SUBJECTIVE AND OBJECTIVE BOX
PROGRESS NOTE:     Patient is a 73y old  Female who presents with a chief complaint of AMS (03 Oct 2024 16:09)      SUBJECTIVE / OVERNIGHT EVENTS: no acute events overnight; intermittently uncooperative due to agitation     ADDITIONAL REVIEW OF SYSTEMS:    MEDICATIONS  (STANDING):  chlorhexidine 2% Cloths 1 Application(s) Topical <User Schedule>  cholecalciferol 1000 Unit(s) Oral daily  dextrose 5%. 1000 milliLiter(s) (100 mL/Hr) IV Continuous <Continuous>  dextrose 5%. 1000 milliLiter(s) (50 mL/Hr) IV Continuous <Continuous>  dextrose 50% Injectable 25 Gram(s) IV Push once  dextrose 50% Injectable 12.5 Gram(s) IV Push once  dextrose 50% Injectable 25 Gram(s) IV Push once  divalproex Sprinkle 1000 milliGRAM(s) Oral two times a day  enoxaparin Injectable 40 milliGRAM(s) SubCutaneous every 24 hours  fluticasone propionate 50 MICROgram(s)/spray Nasal Spray 1 Spray(s) Both Nostrils two times a day  glucagon  Injectable 1 milliGRAM(s) IntraMuscular once  insulin lispro (ADMELOG) corrective regimen sliding scale   SubCutaneous three times a day before meals  insulin lispro (ADMELOG) corrective regimen sliding scale   SubCutaneous at bedtime  levothyroxine 75 MICROGram(s) Oral daily  levothyroxine      melatonin 6 milliGRAM(s) Oral at bedtime  metoprolol succinate ER 50 milliGRAM(s) Oral daily  multivitamin 1 Tablet(s) Oral daily  QUEtiapine 50 milliGRAM(s) Oral at bedtime  QUEtiapine 50 milliGRAM(s) Oral daily  senna 2 Tablet(s) Oral at bedtime    MEDICATIONS  (PRN):  acetaminophen     Tablet .. 650 milliGRAM(s) Oral every 6 hours PRN Temp greater or equal to 38C (100.4F), Mild Pain (1 - 3)  albuterol    90 MICROgram(s) HFA Inhaler 2 Puff(s) Inhalation every 6 hours PRN Shortness of Breath and/or Wheezing  aluminum hydroxide/magnesium hydroxide/simethicone Suspension 30 milliLiter(s) Oral every 4 hours PRN Dyspepsia  bisacodyl 5 milliGRAM(s) Oral every 12 hours PRN Constipation  dextrose Oral Gel 15 Gram(s) Oral once PRN Blood Glucose LESS THAN 70 milliGRAM(s)/deciliter  OLANZapine Injectable 1.25 milliGRAM(s) IntraMuscular every 6 hours PRN agitation  simethicone 80 milliGRAM(s) Chew three times a day PRN Gas      CAPILLARY BLOOD GLUCOSE      POCT Blood Glucose.: 132 mg/dL (03 Oct 2024 22:10)    I&O's Summary      PHYSICAL EXAM:  Vital Signs Last 24 Hrs  T(C): 36.7 (04 Oct 2024 06:40), Max: 36.7 (03 Oct 2024 22:00)  T(F): 98.1 (04 Oct 2024 06:40), Max: 98.1 (03 Oct 2024 22:00)  HR: 66 (04 Oct 2024 06:40) (62 - 68)  BP: 137/74 (04 Oct 2024 06:40) (136/77 - 146/76)  BP(mean): --  RR: 18 (04 Oct 2024 06:40) (18 - 18)  SpO2: 100% (04 Oct 2024 06:40) (100% - 100%)    Parameters below as of 04 Oct 2024 06:40  Patient On (Oxygen Delivery Method): room air        CONSTITUTIONAL: agitated  RESPIRATORY: Normal respiratory effort; lungs are clear to auscultation bilaterally  CARDIOVASCULAR: Regular rate and rhythm, normal S1 and S2, no murmur/rub/gallop;   ABDOMEN: Nontender to palpation, normoactive bowel sounds, no rebound/guarding;  PSYCH: agitated    LABS:                        11.8   4.37  )-----------( 149      ( 04 Oct 2024 04:48 )             37.5     10-04    142  |  106  |  14  ----------------------------<  133[H]  4.1   |  25  |  0.76    Ca    9.3      04 Oct 2024 04:48  Phos  3.5     10-04  Mg     1.60     10-04    TPro  6.6  /  Alb  3.2[L]  /  TBili  <0.2  /  DBili  x   /  AST  26  /  ALT  19  /  AlkPhos  68  10-04          Urinalysis Basic - ( 04 Oct 2024 04:48 )    Color: x / Appearance: x / SG: x / pH: x  Gluc: 133 mg/dL / Ketone: x  / Bili: x / Urobili: x   Blood: x / Protein: x / Nitrite: x   Leuk Esterase: x / RBC: x / WBC x   Sq Epi: x / Non Sq Epi: x / Bacteria: x          RADIOLOGY & ADDITIONAL TESTS:  Results Reviewed:   Imaging Personally Reviewed:  Electrocardiogram Personally Reviewed:    COORDINATION OF CARE:  Care Discussed with Consultants/Other Providers [Y/N]:  Prior or Outpatient Records Reviewed [Y/N]:

## 2024-10-05 LAB
A-TOCOPHEROL VIT E SERPL-MCNC: 10.2 MG/L — SIGNIFICANT CHANGE UP (ref 9–29)
ANION GAP SERPL CALC-SCNC: 13 MMOL/L — SIGNIFICANT CHANGE UP (ref 7–14)
BETA+GAMMA TOCOPHEROL SERPL-MCNC: 0.2 MG/L — LOW (ref 0.5–4.9)
BUN SERPL-MCNC: 16 MG/DL — SIGNIFICANT CHANGE UP (ref 7–23)
CALCIUM SERPL-MCNC: 9 MG/DL — SIGNIFICANT CHANGE UP (ref 8.4–10.5)
CHLORIDE SERPL-SCNC: 105 MMOL/L — SIGNIFICANT CHANGE UP (ref 98–107)
CO2 SERPL-SCNC: 22 MMOL/L — SIGNIFICANT CHANGE UP (ref 22–31)
CREAT SERPL-MCNC: 0.74 MG/DL — SIGNIFICANT CHANGE UP (ref 0.5–1.3)
EGFR: 86 ML/MIN/1.73M2 — SIGNIFICANT CHANGE UP
GLUCOSE SERPL-MCNC: 199 MG/DL — HIGH (ref 70–99)
HCT VFR BLD CALC: 38.8 % — SIGNIFICANT CHANGE UP (ref 34.5–45)
HGB BLD-MCNC: 12.3 G/DL — SIGNIFICANT CHANGE UP (ref 11.5–15.5)
MAGNESIUM SERPL-MCNC: 1.8 MG/DL — SIGNIFICANT CHANGE UP (ref 1.6–2.6)
MCHC RBC-ENTMCNC: 27 PG — SIGNIFICANT CHANGE UP (ref 27–34)
MCHC RBC-ENTMCNC: 31.7 GM/DL — LOW (ref 32–36)
MCV RBC AUTO: 85.1 FL — SIGNIFICANT CHANGE UP (ref 80–100)
NRBC # BLD: 0 /100 WBCS — SIGNIFICANT CHANGE UP (ref 0–0)
NRBC # FLD: 0 K/UL — SIGNIFICANT CHANGE UP (ref 0–0)
PHOSPHATE SERPL-MCNC: 3.7 MG/DL — SIGNIFICANT CHANGE UP (ref 2.5–4.5)
PLATELET # BLD AUTO: 173 K/UL — SIGNIFICANT CHANGE UP (ref 150–400)
POTASSIUM SERPL-MCNC: 4.8 MMOL/L — SIGNIFICANT CHANGE UP (ref 3.5–5.3)
POTASSIUM SERPL-SCNC: 4.8 MMOL/L — SIGNIFICANT CHANGE UP (ref 3.5–5.3)
RBC # BLD: 4.56 M/UL — SIGNIFICANT CHANGE UP (ref 3.8–5.2)
RBC # FLD: 15.6 % — HIGH (ref 10.3–14.5)
SODIUM SERPL-SCNC: 140 MMOL/L — SIGNIFICANT CHANGE UP (ref 135–145)
WBC # BLD: 4.93 K/UL — SIGNIFICANT CHANGE UP (ref 3.8–10.5)
WBC # FLD AUTO: 4.93 K/UL — SIGNIFICANT CHANGE UP (ref 3.8–10.5)

## 2024-10-05 PROCEDURE — 99232 SBSQ HOSP IP/OBS MODERATE 35: CPT

## 2024-10-05 RX ORDER — OLANZAPINE 20 MG/1
1.25 TABLET ORAL ONCE
Refills: 0 | Status: COMPLETED | OUTPATIENT
Start: 2024-10-05 | End: 2024-10-05

## 2024-10-05 RX ADMIN — DIVALPROEX SODIUM 1000 MILLIGRAM(S): 250 TABLET, FILM COATED, EXTENDED RELEASE ORAL at 19:23

## 2024-10-05 RX ADMIN — Medication 2 TABLET(S): at 21:06

## 2024-10-05 RX ADMIN — Medication 75 MICROGRAM(S): at 06:56

## 2024-10-05 RX ADMIN — CHLORHEXIDINE GLUCONATE 1 APPLICATION(S): 40 SOLUTION TOPICAL at 06:56

## 2024-10-05 RX ADMIN — QUETIAPINE FUMARATE 50 MILLIGRAM(S): 200 TABLET ORAL at 11:53

## 2024-10-05 RX ADMIN — OLANZAPINE 1.25 MILLIGRAM(S): 20 TABLET ORAL at 15:57

## 2024-10-05 RX ADMIN — Medication 1 TABLET(S): at 11:49

## 2024-10-05 RX ADMIN — DIVALPROEX SODIUM 1000 MILLIGRAM(S): 250 TABLET, FILM COATED, EXTENDED RELEASE ORAL at 05:56

## 2024-10-05 RX ADMIN — FLUTICASONE PROPIONATE 1 SPRAY(S): 50 SPRAY, METERED NASAL at 19:27

## 2024-10-05 RX ADMIN — Medication 1000 UNIT(S): at 11:49

## 2024-10-05 RX ADMIN — Medication 6 MILLIGRAM(S): at 21:06

## 2024-10-05 RX ADMIN — Medication 50 MILLIGRAM(S): at 05:54

## 2024-10-05 RX ADMIN — QUETIAPINE FUMARATE 50 MILLIGRAM(S): 200 TABLET ORAL at 21:06

## 2024-10-05 RX ADMIN — OLANZAPINE 1.25 MILLIGRAM(S): 20 TABLET ORAL at 15:48

## 2024-10-05 NOTE — PROGRESS NOTE ADULT - PROBLEM SELECTOR PLAN 1
- appears at baseline  - events leading to transfer from [facility] noted and stated as above, now improving back to aaox3 and following commands; sp stroke code with cta, cth, and ct perfusion neg for acute pathology  - neuro consulted; recs appreciated; EEG without seizure activity  - toxic metabolic encepaholopathy ; infectious w/u thus far negative; echo unremarkable  - c/w valproic acid sprinkles 750mg BID  - pt declined MRI, neurology does not need this to be completed and has low suspicion for CVA  -awaiting bed at [facility]  #dilated pupil  -baseline anisocoria (OD 8mm, OS 1mm) per chart review

## 2024-10-05 NOTE — PROGRESS NOTE ADULT - PROBLEM SELECTOR PLAN 2
- admission at [facility] since May for it  - on seroquel 50mg qhs and 50mg daily per last meds from chart transfer note  - c/w psych meds  - delirium precautions  - was on fluphanzine prn for agitation there, will monitor for now and tx symptomatically for agitation based on team assessment  - c/w melatonin qhs  -to return to [facility], awaiting placement

## 2024-10-05 NOTE — PROGRESS NOTE ADULT - SUBJECTIVE AND OBJECTIVE BOX
PROGRESS NOTE:     Patient is a 73y old  Female who presents with a chief complaint of AMS (04 Oct 2024 10:46)      SUBJECTIVE / OVERNIGHT EVENTS: no acute events overnight    ADDITIONAL REVIEW OF SYSTEMS:    MEDICATIONS  (STANDING):  chlorhexidine 2% Cloths 1 Application(s) Topical <User Schedule>  cholecalciferol 1000 Unit(s) Oral daily  dextrose 5%. 1000 milliLiter(s) (100 mL/Hr) IV Continuous <Continuous>  dextrose 5%. 1000 milliLiter(s) (50 mL/Hr) IV Continuous <Continuous>  dextrose 50% Injectable 25 Gram(s) IV Push once  dextrose 50% Injectable 12.5 Gram(s) IV Push once  dextrose 50% Injectable 25 Gram(s) IV Push once  divalproex Sprinkle 1000 milliGRAM(s) Oral two times a day  enoxaparin Injectable 40 milliGRAM(s) SubCutaneous every 24 hours  fluticasone propionate 50 MICROgram(s)/spray Nasal Spray 1 Spray(s) Both Nostrils two times a day  glucagon  Injectable 1 milliGRAM(s) IntraMuscular once  insulin lispro (ADMELOG) corrective regimen sliding scale   SubCutaneous at bedtime  insulin lispro (ADMELOG) corrective regimen sliding scale   SubCutaneous three times a day before meals  levothyroxine 75 MICROGram(s) Oral daily  levothyroxine      melatonin 6 milliGRAM(s) Oral at bedtime  metoprolol succinate ER 50 milliGRAM(s) Oral daily  multivitamin 1 Tablet(s) Oral daily  QUEtiapine 50 milliGRAM(s) Oral daily  QUEtiapine 50 milliGRAM(s) Oral at bedtime  senna 2 Tablet(s) Oral at bedtime    MEDICATIONS  (PRN):  acetaminophen     Tablet .. 650 milliGRAM(s) Oral every 6 hours PRN Temp greater or equal to 38C (100.4F), Mild Pain (1 - 3)  albuterol    90 MICROgram(s) HFA Inhaler 2 Puff(s) Inhalation every 6 hours PRN Shortness of Breath and/or Wheezing  aluminum hydroxide/magnesium hydroxide/simethicone Suspension 30 milliLiter(s) Oral every 4 hours PRN Dyspepsia  bisacodyl 5 milliGRAM(s) Oral every 12 hours PRN Constipation  dextrose Oral Gel 15 Gram(s) Oral once PRN Blood Glucose LESS THAN 70 milliGRAM(s)/deciliter  OLANZapine Injectable 1.25 milliGRAM(s) IntraMuscular every 6 hours PRN agitation  simethicone 80 milliGRAM(s) Chew three times a day PRN Gas      CAPILLARY BLOOD GLUCOSE      POCT Blood Glucose.: 148 mg/dL (04 Oct 2024 21:19)  POCT Blood Glucose.: 137 mg/dL (04 Oct 2024 18:06)  POCT Blood Glucose.: 117 mg/dL (04 Oct 2024 12:00)    I&O's Summary      PHYSICAL EXAM:  Vital Signs Last 24 Hrs  T(C): 36.7 (04 Oct 2024 21:40), Max: 36.7 (04 Oct 2024 21:40)  T(F): 98.1 (04 Oct 2024 21:40), Max: 98.1 (04 Oct 2024 21:40)  HR: 61 (05 Oct 2024 05:30) (61 - 78)  BP: 149/71 (05 Oct 2024 05:30) (149/71 - 150/96)  BP(mean): --  RR: 18 (05 Oct 2024 05:30) (18 - 18)  SpO2: 100% (04 Oct 2024 21:40) (100% - 100%)    Parameters below as of 04 Oct 2024 21:40  Patient On (Oxygen Delivery Method): room air        CONSTITUTIONAL: NAD  RESPIRATORY: Normal respiratory effort; lungs are clear to auscultation bilaterally  CARDIOVASCULAR: Regular rate and rhythm, normal S1 and S2, no murmur/rub/gallop; No lower extremity edema;  ABDOMEN: Nontender to palpation, normoactive bowel sounds, no rebound/guarding;  MUSCLOSKELETAL: no clubbing or cyanosis of digits; no joint swelling or tenderness to palpation  PSYCH:affect appropriate    LABS:                        12.3   4.93  )-----------( 173      ( 05 Oct 2024 08:06 )             38.8     10-05    140  |  105  |  16  ----------------------------<  199[H]  4.8   |  22  |  0.74    Ca    9.0      05 Oct 2024 08:06  Phos  3.7     10-05  Mg     1.80     10-05    TPro  6.6  /  Alb  3.2[L]  /  TBili  <0.2  /  DBili  x   /  AST  26  /  ALT  19  /  AlkPhos  68  10-04          Urinalysis Basic - ( 05 Oct 2024 08:06 )    Color: x / Appearance: x / SG: x / pH: x  Gluc: 199 mg/dL / Ketone: x  / Bili: x / Urobili: x   Blood: x / Protein: x / Nitrite: x   Leuk Esterase: x / RBC: x / WBC x   Sq Epi: x / Non Sq Epi: x / Bacteria: x          RADIOLOGY & ADDITIONAL TESTS:  Results Reviewed:   Imaging Personally Reviewed:  Electrocardiogram Personally Reviewed:    COORDINATION OF CARE:  Care Discussed with Consultants/Other Providers [Y/N]:  Prior or Outpatient Records Reviewed [Y/N]:

## 2024-10-06 LAB — PYRIDOXAL PHOS SERPL-MCNC: 16.8 UG/L — SIGNIFICANT CHANGE UP (ref 3.4–65.2)

## 2024-10-06 PROCEDURE — 99232 SBSQ HOSP IP/OBS MODERATE 35: CPT

## 2024-10-06 RX ORDER — OLANZAPINE 20 MG/1
2.5 TABLET ORAL ONCE
Refills: 0 | Status: COMPLETED | OUTPATIENT
Start: 2024-10-06 | End: 2024-10-06

## 2024-10-06 RX ADMIN — Medication 75 MICROGRAM(S): at 06:34

## 2024-10-06 RX ADMIN — Medication 50 MILLIGRAM(S): at 06:34

## 2024-10-06 RX ADMIN — Medication 1 TABLET(S): at 11:54

## 2024-10-06 RX ADMIN — Medication 2 TABLET(S): at 22:14

## 2024-10-06 RX ADMIN — DIVALPROEX SODIUM 1000 MILLIGRAM(S): 250 TABLET, FILM COATED, EXTENDED RELEASE ORAL at 06:34

## 2024-10-06 RX ADMIN — DIVALPROEX SODIUM 1000 MILLIGRAM(S): 250 TABLET, FILM COATED, EXTENDED RELEASE ORAL at 18:00

## 2024-10-06 RX ADMIN — Medication 1: at 17:58

## 2024-10-06 RX ADMIN — Medication 6 MILLIGRAM(S): at 22:14

## 2024-10-06 RX ADMIN — QUETIAPINE FUMARATE 50 MILLIGRAM(S): 200 TABLET ORAL at 11:54

## 2024-10-06 RX ADMIN — OLANZAPINE 2.5 MILLIGRAM(S): 20 TABLET ORAL at 23:43

## 2024-10-06 RX ADMIN — Medication 40 MILLIGRAM(S): at 11:54

## 2024-10-06 RX ADMIN — OLANZAPINE 1.25 MILLIGRAM(S): 20 TABLET ORAL at 03:08

## 2024-10-06 RX ADMIN — QUETIAPINE FUMARATE 50 MILLIGRAM(S): 200 TABLET ORAL at 22:14

## 2024-10-06 RX ADMIN — Medication 1000 UNIT(S): at 11:54

## 2024-10-06 NOTE — CHART NOTE - NSCHARTNOTEFT_GEN_A_CORE
PA Event Note 10/6/24 3AM CODE CLIFFORD    10/6 3AM CODE CLIFFORD called 2/2 to patient being agitated and throwing objects at CNA. Pt. received Zyprexa 1.25mg IM. Pt. now compliant      Rufina Pacheco, MS PA-C  pager #[phone]

## 2024-10-06 NOTE — PROGRESS NOTE ADULT - PROBLEM SELECTOR PLAN 1
- appears at baseline  - events leading to transfer from [facility] noted and stated as above, now improving back to aaox3 and following commands; sp stroke code with cta, cth, and ct perfusion neg for acute pathology  - neuro consulted; recs appreciated; EEG without seizure activity  - toxic metabolic encepaholopathy ; infectious w/u thus far negative; echo unremarkable  - c/w valproic acid sprinkles 750mg BID  - pt declined MRI, neurology does not need this to be completed and has low suspicion for CVA  -awaiting bed at [facility]  #dilated pupil  -baseline anisocoria (OD 8mm, OS 1mm) per chart review

## 2024-10-06 NOTE — PROGRESS NOTE ADULT - PROBLEM SELECTOR PLAN 2
- admission at [facility] since May for it  - on seroquel 50mg qhs and 50mg daily per last meds from chart transfer note  - c/w psych meds  - delirium precautions  - was on fluphanzine prn for agitation there, will monitor for now and tx symptomatically for agitation based on team assessment  - c/w melatonin qhs  -to return to [facility], awaiting placement

## 2024-10-06 NOTE — CHART NOTE - NSCHARTNOTEFT_GEN_A_CORE
ACP MEDICINE NIGHT COVERAGE     ACP at bedside for CLIFFORD heard overhead. Upon arrival to room, patient seen throwing items at staff and extremely agitated. Verbal deescalation attempted but patient continued to be combative, throwing items at staff and spitting/biting. 2.5mg IM zyprexa administered and patient placed in 4 point restraints. Will continue to monitor need for restraints and discontinue as soon as appropriate.       DAO De Oliveira-BC   Medicine ACP   [id number]

## 2024-10-06 NOTE — PROGRESS NOTE ADULT - SUBJECTIVE AND OBJECTIVE BOX
PROGRESS NOTE:     Patient is a 73y old  Female who presents with a chief complaint of AMS (05 Oct 2024 11:09)      SUBJECTIVE / OVERNIGHT EVENTS: overnight events notes; pt agitated required zyprexa; currently calm/cooperative    ADDITIONAL REVIEW OF SYSTEMS:    MEDICATIONS  (STANDING):  chlorhexidine 2% Cloths 1 Application(s) Topical <User Schedule>  cholecalciferol 1000 Unit(s) Oral daily  dextrose 5%. 1000 milliLiter(s) (50 mL/Hr) IV Continuous <Continuous>  dextrose 5%. 1000 milliLiter(s) (100 mL/Hr) IV Continuous <Continuous>  dextrose 50% Injectable 25 Gram(s) IV Push once  dextrose 50% Injectable 12.5 Gram(s) IV Push once  dextrose 50% Injectable 25 Gram(s) IV Push once  divalproex Sprinkle 1000 milliGRAM(s) Oral two times a day  enoxaparin Injectable 40 milliGRAM(s) SubCutaneous every 24 hours  fluticasone propionate 50 MICROgram(s)/spray Nasal Spray 1 Spray(s) Both Nostrils two times a day  glucagon  Injectable 1 milliGRAM(s) IntraMuscular once  insulin lispro (ADMELOG) corrective regimen sliding scale   SubCutaneous three times a day before meals  insulin lispro (ADMELOG) corrective regimen sliding scale   SubCutaneous at bedtime  levothyroxine 75 MICROGram(s) Oral daily  levothyroxine      melatonin 6 milliGRAM(s) Oral at bedtime  metoprolol succinate ER 50 milliGRAM(s) Oral daily  multivitamin 1 Tablet(s) Oral daily  QUEtiapine 50 milliGRAM(s) Oral daily  QUEtiapine 50 milliGRAM(s) Oral at bedtime  senna 2 Tablet(s) Oral at bedtime    MEDICATIONS  (PRN):  acetaminophen     Tablet .. 650 milliGRAM(s) Oral every 6 hours PRN Temp greater or equal to 38C (100.4F), Mild Pain (1 - 3)  albuterol    90 MICROgram(s) HFA Inhaler 2 Puff(s) Inhalation every 6 hours PRN Shortness of Breath and/or Wheezing  aluminum hydroxide/magnesium hydroxide/simethicone Suspension 30 milliLiter(s) Oral every 4 hours PRN Dyspepsia  bisacodyl 5 milliGRAM(s) Oral every 12 hours PRN Constipation  dextrose Oral Gel 15 Gram(s) Oral once PRN Blood Glucose LESS THAN 70 milliGRAM(s)/deciliter  OLANZapine Injectable 1.25 milliGRAM(s) IntraMuscular every 6 hours PRN agitation  simethicone 80 milliGRAM(s) Chew three times a day PRN Gas      CAPILLARY BLOOD GLUCOSE      POCT Blood Glucose.: 143 mg/dL (06 Oct 2024 08:24)  POCT Blood Glucose.: 217 mg/dL (05 Oct 2024 22:12)  POCT Blood Glucose.: 158 mg/dL (05 Oct 2024 18:32)  POCT Blood Glucose.: 69 mg/dL (05 Oct 2024 17:53)    I&O's Summary      PHYSICAL EXAM:  Vital Signs Last 24 Hrs  T(C): 36.4 (06 Oct 2024 06:31), Max: 36.4 (06 Oct 2024 06:31)  T(F): 97.5 (06 Oct 2024 06:31), Max: 97.5 (06 Oct 2024 06:31)  HR: 61 (06 Oct 2024 06:31) (61 - 78)  BP: 145/69 (06 Oct 2024 06:31) (137/72 - 145/69)  BP(mean): --  RR: 18 (06 Oct 2024 06:31) (18 - 18)  SpO2: 100% (06 Oct 2024 06:31) (100% - 100%)    Parameters below as of 06 Oct 2024 06:31  Patient On (Oxygen Delivery Method): room air        CONSTITUTIONAL: NAD  RESPIRATORY: Normal respiratory effort; lungs are clear to auscultation bilaterally  CARDIOVASCULAR: Regular rate and rhythm, normal S1 and S2, no murmur/rub/gallop; No lower extremity edema  ABDOMEN: Nontender to palpation, normoactive bowel sounds, no rebound/guarding  PSYCH: interactive affect appropriate    LABS:                        12.3   4.93  )-----------( 173      ( 05 Oct 2024 08:06 )             38.8     10-05    140  |  105  |  16  ----------------------------<  199[H]  4.8   |  22  |  0.74    Ca    9.0      05 Oct 2024 08:06  Phos  3.7     10-05  Mg     1.80     10-05            Urinalysis Basic - ( 05 Oct 2024 08:06 )    Color: x / Appearance: x / SG: x / pH: x  Gluc: 199 mg/dL / Ketone: x  / Bili: x / Urobili: x   Blood: x / Protein: x / Nitrite: x   Leuk Esterase: x / RBC: x / WBC x   Sq Epi: x / Non Sq Epi: x / Bacteria: x          RADIOLOGY & ADDITIONAL TESTS:  Results Reviewed:   Imaging Personally Reviewed:  Electrocardiogram Personally Reviewed:    COORDINATION OF CARE:  Care Discussed with Consultants/Other Providers [Y/N]:  Prior or Outpatient Records Reviewed [Y/N]:

## 2024-10-07 LAB
GLUCOSE BLDC GLUCOMTR-MCNC: 136 MG/DL — HIGH (ref 70–99)
GLUCOSE BLDC GLUCOMTR-MCNC: 95 MG/DL — SIGNIFICANT CHANGE UP (ref 70–99)
METHYLMALONATE SERPL-SCNC: 140 NMOL/L — SIGNIFICANT CHANGE UP (ref 0–378)
SARS-COV-2 RNA SPEC QL NAA+PROBE: SIGNIFICANT CHANGE UP

## 2024-10-07 PROCEDURE — 99232 SBSQ HOSP IP/OBS MODERATE 35: CPT

## 2024-10-07 RX ORDER — QUETIAPINE FUMARATE 200 MG/1
75 TABLET ORAL DAILY
Refills: 0 | Status: DISCONTINUED | OUTPATIENT
Start: 2024-10-08 | End: 2024-10-21

## 2024-10-07 RX ORDER — QUETIAPINE FUMARATE 200 MG/1
75 TABLET ORAL AT BEDTIME
Refills: 0 | Status: DISCONTINUED | OUTPATIENT
Start: 2024-10-07 | End: 2024-10-21

## 2024-10-07 RX ADMIN — OLANZAPINE 1.25 MILLIGRAM(S): 20 TABLET ORAL at 16:40

## 2024-10-07 RX ADMIN — Medication 75 MICROGRAM(S): at 05:52

## 2024-10-07 RX ADMIN — Medication 50 MILLIGRAM(S): at 05:52

## 2024-10-07 RX ADMIN — QUETIAPINE FUMARATE 50 MILLIGRAM(S): 200 TABLET ORAL at 12:31

## 2024-10-07 RX ADMIN — Medication 1000 UNIT(S): at 12:31

## 2024-10-07 RX ADMIN — DIVALPROEX SODIUM 1000 MILLIGRAM(S): 250 TABLET, FILM COATED, EXTENDED RELEASE ORAL at 18:47

## 2024-10-07 RX ADMIN — Medication 40 MILLIGRAM(S): at 12:32

## 2024-10-07 RX ADMIN — CHLORHEXIDINE GLUCONATE 1 APPLICATION(S): 40 SOLUTION TOPICAL at 05:53

## 2024-10-07 RX ADMIN — Medication 1 TABLET(S): at 12:30

## 2024-10-07 RX ADMIN — FLUTICASONE PROPIONATE 1 SPRAY(S): 50 SPRAY, METERED NASAL at 05:52

## 2024-10-07 NOTE — DIETITIAN INITIAL EVALUATION ADULT - PERTINENT MEDS FT
acutely intoxicated, occasionally agitated but redirectable.
MEDICATIONS  (STANDING):  chlorhexidine 2% Cloths 1 Application(s) Topical <User Schedule>  cholecalciferol 1000 Unit(s) Oral daily  dextrose 5%. 1000 milliLiter(s) (100 mL/Hr) IV Continuous <Continuous>  dextrose 5%. 1000 milliLiter(s) (50 mL/Hr) IV Continuous <Continuous>  dextrose 50% Injectable 25 Gram(s) IV Push once  dextrose 50% Injectable 12.5 Gram(s) IV Push once  dextrose 50% Injectable 25 Gram(s) IV Push once  divalproex Sprinkle 1000 milliGRAM(s) Oral two times a day  enoxaparin Injectable 40 milliGRAM(s) SubCutaneous every 24 hours  fluticasone propionate 50 MICROgram(s)/spray Nasal Spray 1 Spray(s) Both Nostrils two times a day  glucagon  Injectable 1 milliGRAM(s) IntraMuscular once  insulin lispro (ADMELOG) corrective regimen sliding scale   SubCutaneous at bedtime  insulin lispro (ADMELOG) corrective regimen sliding scale   SubCutaneous three times a day before meals  levothyroxine 75 MICROGram(s) Oral daily  levothyroxine      melatonin 6 milliGRAM(s) Oral at bedtime  metoprolol succinate ER 50 milliGRAM(s) Oral daily  multivitamin 1 Tablet(s) Oral daily  QUEtiapine 50 milliGRAM(s) Oral daily  QUEtiapine 50 milliGRAM(s) Oral at bedtime  senna 2 Tablet(s) Oral at bedtime    MEDICATIONS  (PRN):  acetaminophen     Tablet .. 650 milliGRAM(s) Oral every 6 hours PRN Temp greater or equal to 38C (100.4F), Mild Pain (1 - 3)  albuterol    90 MICROgram(s) HFA Inhaler 2 Puff(s) Inhalation every 6 hours PRN Shortness of Breath and/or Wheezing  aluminum hydroxide/magnesium hydroxide/simethicone Suspension 30 milliLiter(s) Oral every 4 hours PRN Dyspepsia  bisacodyl 5 milliGRAM(s) Oral every 12 hours PRN Constipation  dextrose Oral Gel 15 Gram(s) Oral once PRN Blood Glucose LESS THAN 70 milliGRAM(s)/deciliter  OLANZapine Injectable 1.25 milliGRAM(s) IntraMuscular every 6 hours PRN agitation  simethicone 80 milliGRAM(s) Chew three times a day PRN Gas

## 2024-10-07 NOTE — DIETITIAN INITIAL EVALUATION ADULT - NS FNS DIET ORDER
Diet, DASH/TLC:   Sodium & Cholesterol Restricted  Consistent Carbohydrate {Evening Snack} (CSTCHOSN)  Minced and Moist (MINCEDMOIST) (09-29-24 @ 09:27) [Active]

## 2024-10-07 NOTE — PROGRESS NOTE ADULT - SUBJECTIVE AND OBJECTIVE BOX
Keven Irizarry MD  [facility] Division of Hospital Medicine  Pager: [phone]  Available via MS Teams    SUBJECTIVE / OVERNIGHT EVENTS:    no new complaints    MEDICATIONS  (STANDING):  chlorhexidine 2% Cloths 1 Application(s) Topical <User Schedule>  cholecalciferol 1000 Unit(s) Oral daily  dextrose 5%. 1000 milliLiter(s) (50 mL/Hr) IV Continuous <Continuous>  dextrose 5%. 1000 milliLiter(s) (100 mL/Hr) IV Continuous <Continuous>  dextrose 50% Injectable 25 Gram(s) IV Push once  dextrose 50% Injectable 12.5 Gram(s) IV Push once  dextrose 50% Injectable 25 Gram(s) IV Push once  divalproex Sprinkle 1000 milliGRAM(s) Oral two times a day  enoxaparin Injectable 40 milliGRAM(s) SubCutaneous every 24 hours  fluticasone propionate 50 MICROgram(s)/spray Nasal Spray 1 Spray(s) Both Nostrils two times a day  glucagon  Injectable 1 milliGRAM(s) IntraMuscular once  insulin lispro (ADMELOG) corrective regimen sliding scale   SubCutaneous three times a day before meals  insulin lispro (ADMELOG) corrective regimen sliding scale   SubCutaneous at bedtime  levothyroxine 75 MICROGram(s) Oral daily  levothyroxine      melatonin 6 milliGRAM(s) Oral at bedtime  metoprolol succinate ER 50 milliGRAM(s) Oral daily  multivitamin 1 Tablet(s) Oral daily  QUEtiapine 75 milliGRAM(s) Oral at bedtime  senna 2 Tablet(s) Oral at bedtime    MEDICATIONS  (PRN):  acetaminophen     Tablet .. 650 milliGRAM(s) Oral every 6 hours PRN Temp greater or equal to 38C (100.4F), Mild Pain (1 - 3)  albuterol    90 MICROgram(s) HFA Inhaler 2 Puff(s) Inhalation every 6 hours PRN Shortness of Breath and/or Wheezing  aluminum hydroxide/magnesium hydroxide/simethicone Suspension 30 milliLiter(s) Oral every 4 hours PRN Dyspepsia  bisacodyl 5 milliGRAM(s) Oral every 12 hours PRN Constipation  dextrose Oral Gel 15 Gram(s) Oral once PRN Blood Glucose LESS THAN 70 milliGRAM(s)/deciliter  OLANZapine Injectable 1.25 milliGRAM(s) IntraMuscular every 6 hours PRN agitation  simethicone 80 milliGRAM(s) Chew three times a day PRN Gas      I&O's Summary      PHYSICAL EXAM:  Vital Signs Last 24 Hrs  T(C): 36.2 (07 Oct 2024 18:00), Max: 36.9 (06 Oct 2024 22:30)  T(F): 97.2 (07 Oct 2024 18:00), Max: 98.5 (06 Oct 2024 22:30)  HR: 63 (07 Oct 2024 18:00) (57 - 70)  BP: 140/60 (07 Oct 2024 18:00) (116/64 - 140/60)  BP(mean): --  RR: 18 (07 Oct 2024 18:00) (16 - 18)  SpO2: 98% (07 Oct 2024 18:00) (98% - 100%)    Parameters below as of 07 Oct 2024 18:00  Patient On (Oxygen Delivery Method): room air      CONSTITUTIONAL: NAD   EYES: conjunctiva and sclera clear  ENMT: Moist oral mucosa   NECK: Supple   RESPIRATORY: Normal respiratory effort; lungs are clear to auscultation bilaterally  CARDIOVASCULAR: Regular rate and rhythm, normal S1 and S2, no murmur/rub/gallop; Peripheral pulses are 2+ bilaterally  ABDOMEN: Nontender to palpation, normoactive bowel sounds, no rebound/guarding   MUSCULOSKELETAL: No lower extremity edema   PSYCH:  affect appropriate  NEUROLOGY: moves all extremities   SKIN: No rashes    LABS:                    COVID-19 PCR: NotDetec (07 Oct 2024 12:01)  COVID-19 PCR: NotDetec (04 Oct 2024 04:45)      COORDINATION OF CARE:  Communication: discussed plan of care with ACP

## 2024-10-07 NOTE — BH CONSULTATION LIAISON PROGRESS NOTE - NSBHFUPINTERVALHXFT_PSY_A_CORE
Pt requiring restraints and prn meds over weekend. Currently out of restraints. Was throwing things at staff on weekend. Pt seen at bedside. Pt states that staff were mistreating her over the weekend. Insisting that I get her coffee and cut her banana. States that I am bad for the bible and god. States that they need to take away her restraints so she can show them that she can behave.

## 2024-10-07 NOTE — DIETITIAN INITIAL EVALUATION ADULT - PHYSCIAL ASSESSMENT
no overt s/s malnutrition/overweight Area M Indication Text: Tumors in this location are included in Area M (cheek, forehead, scalp, neck, jawline and pretibial skin).  Mohs surgery is indicated for tumors in these anatomic locations.

## 2024-10-07 NOTE — DIETITIAN INITIAL EVALUATION ADULT - PERTINENT LABORATORY DATA
10-05 Na 140 mmol/L Glu 199 mg/dL[H] K+ 4.8 mmol/L Cr 0.74 mg/dL BUN 16 mg/dL Phos 3.7 mg/dL  10-07 @ 12:26 POCT 136 mg/dL  10-07 @ 08:38 POCT 127 mg/dL  10-06 @ 22:18 POCT 201 mg/dL  10-06 @ 17:05 POCT 156 mg/dL      POCT Blood Glucose.: 127 mg/dL (10-07-24 @ 08:38)  A1C with Estimated Average Glucose Result: 7.3 % (09-29-24 @ 17:15)  A1C with Estimated Average Glucose Result: 7.9 % (08-27-24 @ 09:18)  A1C with Estimated Average Glucose Result: 8.3 % (05-18-24 @ 06:52)

## 2024-10-07 NOTE — PROGRESS NOTE ADULT - PROBLEM SELECTOR PLAN 1
- appears at baseline  - events leading to transfer from [facility] noted and stated as above, now improving back to aaox3 and following commands; sp stroke code with cta, cth, and ct perfusion neg for acute pathology  - neuro consulted; recs appreciated; EEG without seizure activity  - toxic metabolic encepaholopathy ; infectious w/u thus far negative; echo unremarkable  - c/w valproic acid sprinkles 750mg BID  - pt declined MRI, neurology does not need this to be completed and has low suspicion for CVA  -awaiting bed at [facility]  #dilated pupil  -baseline anisocoria (OD 8mm, OS 1mm) per chart review - appears at baseline  - events leading to transfer from [facility] noted and stated as above, now improving back to aaox3 and following commands; sp stroke code with cta, cth, and ct perfusion neg for acute pathology  - neuro consulted; recs appreciated; EEG without seizure activity  - toxic metabolic encepaholopathy ; infectious w/u thus far negative; echo unremarkable  - c/w valproic acid sprinkles 750mg BID  - Increase to Seroquel 75 mg bid  - Next Prolixin Dec 12.5 mg IM q2w due 10/8/24  - pt declined MRI, neurology does not need this to be completed and has low suspicion for CVA  -awaiting bed at [facility]    #dilated pupil  -baseline anisocoria (OD 8mm, OS 1mm) per chart review

## 2024-10-07 NOTE — DIETITIAN INITIAL EVALUATION ADULT - OTHER INFO
Per chart, 72 y/o female with PMH of recurrent major depressive disorder, htn, niddmt2, hld, parkinson's disease (not on active meds), hypothyroidism, hx of seizure disorder per chart review who presented from [facility] overnight after noted to have unwitnessed fall around 0028 hours, lying on L side, disoriented, slurring her words, episode of urinary incontinence without recollection of preceding events. Stroke code on presentation. Admitted for further workup for possible seizure.     Pt confused. Information obtained from comprehensive chart review and per RN today: No reports of any recent episodes of nausea, vomiting, diarrhea or constipation, Last BM noted on 10/2 per RN flowsheets? Continue to monitor and document BMs in flowsheets. Consider bowel regimen. No reports of any chewing/swallowing difficulties. Pt continues on altered consistency diet (minced and moist) per swallow evaluation 9/30. Pt with allergy to [place]. UBW: 162-168# per HIE. Food preferences explored and noted. Intake is fair-excellent (82=596%) per RN flowsheets and per pt. Feeding skills: independent.

## 2024-10-07 NOTE — PROGRESS NOTE ADULT - PROBLEM SELECTOR PLAN 2
- admission at [facility] since May for it  - on seroquel 50mg qhs and 50mg daily per last meds from chart transfer note  - c/w psych meds  - delirium precautions  - was on fluphanzine prn for agitation there, will monitor for now and tx symptomatically for agitation based on team assessment  - c/w melatonin qhs  -to return to [facility], awaiting placement - admission at [facility] since May for it  - c/w valproic acid sprinkles 750mg BID  - Increase to Seroquel 75 mg bid  - Next Prolixin Dec 12.5 mg IM q2w due 10/8/24  - c/w psych meds  - delirium precautions  - was on fluphanzine prn for agitation there, will monitor for now and tx symptomatically for agitation based on team assessment  - c/w melatonin qhs  -to return to [facility], awaiting placement

## 2024-10-07 NOTE — BH CONSULTATION LIAISON PROGRESS NOTE - NSBHATTESTCOMMENTATTENDFT_PSY_A_CORE
Chart reviewed, events noted, pt. seen/evaluated with Dr. Mariee, I agree with above assessment/plan. Patient alert, irritable, paranoid, minimally engaged, interview rather limited due to level of disorganization. Plan as above, will follow

## 2024-10-07 NOTE — BH CONSULTATION LIAISON PROGRESS NOTE - NSBHASSESSMENTFT_PSY_ALL_CORE
Ms Azul is a 74 yo F w history of SCZ, Parkinson's dz, hypothyroidism, and multiple psych hospitalizations who presents for AMS. Pt transferred from [facility], where she was found to have fallen and was covered in urine. Pt has been hospitalized in [facility] with occasional stays at [facility] for medical concerns since the start of this year for psychosis. Per [facility] notes, pt appears to be awaiting transfer to [facility] hospital. Pt has been delusional at [facility] with recent delusions surrounding pregnancy. Current medications are Depakote 750 mg bid, Seroquel 50 mg bid, and Prolixin Dec 12.5 mg IM q2w (last dose 9/24). Psychiatry was consulted for continued psychiatric care.    9/30: Pt continues to be paranoid but no agitation noted. EEG done; no seizures recorded. Pending ongoing medical work up.  10/1: Pt continues to be paranoid; no prns required. Ongoing medial work up; once done plan to return to [facility].  10/2: No change today. Once optimized, will return to [facility]. 2PC completed.  10/3: No changes. Continue meds as is.  10/4: Agitation yesterday requiring prn and restraints. Okay today but continues to be paranoid. Pending transfer to [facility]. Low VPA level but missed dose yesterday; not accurate level.  10/7: Agitated over weekend, requiring restraints and prns. Increase Seroquel for paranoia/agitation. Once pt has been taking consistent dose of Depakote, repeat level. Pending [facility] transfer.    Recs:  - Continue Depakote 750 mg bid  - Increase to Seroquel 75 mg bid  	- hold if QTC > 500  - Next Prolixin Dec 12.5 mg IM q2w due 10/8/24  - consider Zyprexa 1.25 mg po/IM q6h prn for agitation if warranted; may give additional Zyprexa 1.25 mg if first dose is ineffective. hold for QTC > 500  -Continue to monitor VPA level   - Obs: started on 1:1 by team, recommend to  continue CO 1:1 for impulsivity/recent agitation  - Dispo: TBD,  return to [facility] once medically optimized; 2PC completed and in the chart 10/2/24

## 2024-10-08 LAB
GLUCOSE BLDC GLUCOMTR-MCNC: 100 MG/DL — HIGH (ref 70–99)
GLUCOSE BLDC GLUCOMTR-MCNC: 137 MG/DL — HIGH (ref 70–99)
GLUCOSE BLDC GLUCOMTR-MCNC: 191 MG/DL — HIGH (ref 70–99)

## 2024-10-08 PROCEDURE — 99232 SBSQ HOSP IP/OBS MODERATE 35: CPT | Mod: GC

## 2024-10-08 RX ORDER — OLANZAPINE 20 MG/1
2.5 TABLET ORAL ONCE
Refills: 0 | Status: COMPLETED | OUTPATIENT
Start: 2024-10-08 | End: 2024-10-08

## 2024-10-08 RX ADMIN — Medication 40 MILLIGRAM(S): at 12:42

## 2024-10-08 RX ADMIN — CHLORHEXIDINE GLUCONATE 1 APPLICATION(S): 40 SOLUTION TOPICAL at 06:19

## 2024-10-08 RX ADMIN — QUETIAPINE FUMARATE 75 MILLIGRAM(S): 200 TABLET ORAL at 12:44

## 2024-10-08 RX ADMIN — Medication 1: at 12:44

## 2024-10-08 RX ADMIN — OLANZAPINE 2.5 MILLIGRAM(S): 20 TABLET ORAL at 02:49

## 2024-10-08 RX ADMIN — Medication 1000 UNIT(S): at 12:43

## 2024-10-08 RX ADMIN — QUETIAPINE FUMARATE 75 MILLIGRAM(S): 200 TABLET ORAL at 21:58

## 2024-10-08 RX ADMIN — Medication 2 TABLET(S): at 21:58

## 2024-10-08 RX ADMIN — Medication 50 MILLIGRAM(S): at 06:08

## 2024-10-08 RX ADMIN — Medication 1 TABLET(S): at 12:43

## 2024-10-08 RX ADMIN — Medication 6 MILLIGRAM(S): at 21:58

## 2024-10-08 RX ADMIN — DIVALPROEX SODIUM 1000 MILLIGRAM(S): 250 TABLET, FILM COATED, EXTENDED RELEASE ORAL at 06:09

## 2024-10-08 NOTE — PROGRESS NOTE ADULT - PROBLEM SELECTOR PLAN 2
- admission at [facility] since May for it  - c/w valproic acid sprinkles 750mg BID  - Increase to Seroquel 75 mg bid  - Next Prolixin Dec 12.5 mg IM q2w due 10/8/24  - c/w psych meds  - delirium precautions  - was on fluphanzine prn for agitation there, will monitor for now and tx symptomatically for agitation based on team assessment  - c/w melatonin qhs  -to return to [facility], awaiting placement

## 2024-10-08 NOTE — PROGRESS NOTE ADULT - SUBJECTIVE AND OBJECTIVE BOX
Keevn Irizarry MD  [facility] Division of Hospital Medicine  Pager: [phone]  Available via MS Teams    SUBJECTIVE / OVERNIGHT EVENTS:    code CLIFFORD overnight, but  states she would like implants and lost her dentures       MEDICATIONS  (STANDING):  chlorhexidine 2% Cloths 1 Application(s) Topical <User Schedule>  cholecalciferol 1000 Unit(s) Oral daily  dextrose 5%. 1000 milliLiter(s) (50 mL/Hr) IV Continuous <Continuous>  dextrose 5%. 1000 milliLiter(s) (100 mL/Hr) IV Continuous <Continuous>  dextrose 50% Injectable 25 Gram(s) IV Push once  dextrose 50% Injectable 12.5 Gram(s) IV Push once  dextrose 50% Injectable 25 Gram(s) IV Push once  divalproex Sprinkle 1000 milliGRAM(s) Oral two times a day  enoxaparin Injectable 40 milliGRAM(s) SubCutaneous every 24 hours  fluticasone propionate 50 MICROgram(s)/spray Nasal Spray 1 Spray(s) Both Nostrils two times a day  glucagon  Injectable 1 milliGRAM(s) IntraMuscular once  insulin lispro (ADMELOG) corrective regimen sliding scale   SubCutaneous at bedtime  insulin lispro (ADMELOG) corrective regimen sliding scale   SubCutaneous three times a day before meals  levothyroxine 75 MICROGram(s) Oral daily  levothyroxine      melatonin 6 milliGRAM(s) Oral at bedtime  metoprolol succinate ER 50 milliGRAM(s) Oral daily  multivitamin 1 Tablet(s) Oral daily  QUEtiapine 75 milliGRAM(s) Oral at bedtime  QUEtiapine 75 milliGRAM(s) Oral daily  senna 2 Tablet(s) Oral at bedtime    MEDICATIONS  (PRN):  acetaminophen     Tablet .. 650 milliGRAM(s) Oral every 6 hours PRN Temp greater or equal to 38C (100.4F), Mild Pain (1 - 3)  albuterol    90 MICROgram(s) HFA Inhaler 2 Puff(s) Inhalation every 6 hours PRN Shortness of Breath and/or Wheezing  aluminum hydroxide/magnesium hydroxide/simethicone Suspension 30 milliLiter(s) Oral every 4 hours PRN Dyspepsia  bisacodyl 5 milliGRAM(s) Oral every 12 hours PRN Constipation  dextrose Oral Gel 15 Gram(s) Oral once PRN Blood Glucose LESS THAN 70 milliGRAM(s)/deciliter  OLANZapine Injectable 1.25 milliGRAM(s) IntraMuscular every 6 hours PRN agitation  simethicone 80 milliGRAM(s) Chew three times a day PRN Gas      I&O's Summary      PHYSICAL EXAM:  Vital Signs Last 24 Hrs  T(C): 36.3 (08 Oct 2024 12:45), Max: 37.1 (08 Oct 2024 00:45)  T(F): 97.4 (08 Oct 2024 12:45), Max: 98.7 (08 Oct 2024 00:45)  HR: 63 (08 Oct 2024 12:45) (63 - 63)  BP: 142/71 (08 Oct 2024 12:45) (130/68 - 142/71)  BP(mean): --  RR: 18 (08 Oct 2024 12:45) (18 - 18)  SpO2: 100% (08 Oct 2024 12:45) (98% - 100%)    Parameters below as of 08 Oct 2024 12:45  Patient On (Oxygen Delivery Method): room air      CONSTITUTIONAL: NAD   EYES: conjunctiva and sclera clear  ENMT: Moist oral mucosa   NECK: Supple   RESPIRATORY: Normal respiratory effort; lungs are clear to auscultation bilaterally  CARDIOVASCULAR: Regular rate and rhythm, normal S1 and S2, no murmur/rub/gallop; Peripheral pulses are 2+ bilaterally  ABDOMEN: Nontender to palpation, normoactive bowel sounds, no rebound/guarding   MUSCULOSKELETAL: No lower extremity edema   PSYCH: affect appropriate  NEUROLOGY: moves all extremities   SKIN: No rashes    LABS:                    COVID-19 PCR: NotDetec (07 Oct 2024 12:01)  COVID-19 PCR: NotDetec (04 Oct 2024 04:45)      RADIOLOGY & ADDITIONAL TESTS:  Other Results Reviewed Today: BMP with stable Cr, CBC with stable Hg     COORDINATION OF CARE:  Communication: discussed plan of care with ACP

## 2024-10-08 NOTE — PROVIDER CONTACT NOTE (OTHER) - ASSESSMENT
Patient is A&O x3. Very agitated and calling names. FS, meds, and VS offered multiple times and refused. IV not placed due to refusal.

## 2024-10-08 NOTE — PROGRESS NOTE ADULT - PROBLEM SELECTOR PLAN 1
- appears at baseline  - events leading to transfer from [facility] noted and stated as above, now improving back to aaox3 and following commands; sp stroke code with cta, cth, and ct perfusion neg for acute pathology  - neuro consulted; recs appreciated; EEG without seizure activity  - toxic metabolic encepaholopathy ; infectious w/u thus far negative; echo unremarkable  - c/w valproic acid sprinkles 750mg BID  - Increase to Seroquel 75 mg bid  - Next Prolixin Dec 12.5 mg IM q2w due 10/8/24  - pt declined MRI, neurology does not need this to be completed and has low suspicion for CVA  -awaiting bed at [facility]    #dilated pupil  -baseline anisocoria (OD 8mm, OS 1mm) per chart review

## 2024-10-08 NOTE — CHART NOTE - NSCHARTNOTEFT_GEN_A_CORE
ACP Overnight Coverage- Code CLIFFORD      Code CLIFFORD called ahead for agitation. Prior to writer's arrival, patient hit PCA. Upon writer's arrival, patient extremely agitated. Staff attempted verbal deescalation techniques, however unsuccessful. 2.5mg IM of Zyprexa administered and patient placed on 4 point restraints. Will continue to monitor and reassess the need of restraints.     Joyce Alamo PA-C  Medicine [phone]

## 2024-10-09 LAB
GLUCOSE BLDC GLUCOMTR-MCNC: 113 MG/DL — HIGH (ref 70–99)
GLUCOSE BLDC GLUCOMTR-MCNC: 187 MG/DL — HIGH (ref 70–99)
GLUCOSE BLDC GLUCOMTR-MCNC: 97 MG/DL — SIGNIFICANT CHANGE UP (ref 70–99)

## 2024-10-09 PROCEDURE — 99231 SBSQ HOSP IP/OBS SF/LOW 25: CPT

## 2024-10-09 PROCEDURE — 99232 SBSQ HOSP IP/OBS MODERATE 35: CPT

## 2024-10-09 RX ORDER — FLUPHENAZINE HCL 5 MG
12.5 TABLET ORAL ONCE
Refills: 0 | Status: COMPLETED | OUTPATIENT
Start: 2024-10-09 | End: 2024-10-09

## 2024-10-09 RX ADMIN — Medication 1 TABLET(S): at 11:18

## 2024-10-09 RX ADMIN — DIVALPROEX SODIUM 1000 MILLIGRAM(S): 250 TABLET, FILM COATED, EXTENDED RELEASE ORAL at 23:13

## 2024-10-09 RX ADMIN — Medication 2 TABLET(S): at 23:15

## 2024-10-09 RX ADMIN — Medication 50 MILLIGRAM(S): at 10:56

## 2024-10-09 RX ADMIN — Medication 6 MILLIGRAM(S): at 23:16

## 2024-10-09 RX ADMIN — QUETIAPINE FUMARATE 75 MILLIGRAM(S): 200 TABLET ORAL at 11:19

## 2024-10-09 RX ADMIN — Medication 1000 UNIT(S): at 11:18

## 2024-10-09 RX ADMIN — Medication 40 MILLIGRAM(S): at 11:19

## 2024-10-09 RX ADMIN — Medication 12.5 MILLIGRAM(S): at 11:14

## 2024-10-09 RX ADMIN — QUETIAPINE FUMARATE 75 MILLIGRAM(S): 200 TABLET ORAL at 23:16

## 2024-10-09 NOTE — PROGRESS NOTE ADULT - SUBJECTIVE AND OBJECTIVE BOX
Keven Irizarry MD  [facility] Division of Hospital Medicine  Pager: [phone]  Available via MS Teams    SUBJECTIVE / OVERNIGHT EVENTS:    complaints of dental pain - periodically   agitated but redirectable     MEDICATIONS  (STANDING):  chlorhexidine 2% Cloths 1 Application(s) Topical <User Schedule>  cholecalciferol 1000 Unit(s) Oral daily  dextrose 5%. 1000 milliLiter(s) (100 mL/Hr) IV Continuous <Continuous>  dextrose 5%. 1000 milliLiter(s) (50 mL/Hr) IV Continuous <Continuous>  dextrose 50% Injectable 25 Gram(s) IV Push once  dextrose 50% Injectable 12.5 Gram(s) IV Push once  dextrose 50% Injectable 25 Gram(s) IV Push once  divalproex Sprinkle 1000 milliGRAM(s) Oral two times a day  enoxaparin Injectable 40 milliGRAM(s) SubCutaneous every 24 hours  fluticasone propionate 50 MICROgram(s)/spray Nasal Spray 1 Spray(s) Both Nostrils two times a day  glucagon  Injectable 1 milliGRAM(s) IntraMuscular once  insulin lispro (ADMELOG) corrective regimen sliding scale   SubCutaneous three times a day before meals  insulin lispro (ADMELOG) corrective regimen sliding scale   SubCutaneous at bedtime  levothyroxine 75 MICROGram(s) Oral daily  levothyroxine      melatonin 6 milliGRAM(s) Oral at bedtime  metoprolol succinate ER 50 milliGRAM(s) Oral daily  multivitamin 1 Tablet(s) Oral daily  QUEtiapine 75 milliGRAM(s) Oral daily  QUEtiapine 75 milliGRAM(s) Oral at bedtime  senna 2 Tablet(s) Oral at bedtime    MEDICATIONS  (PRN):  acetaminophen     Tablet .. 650 milliGRAM(s) Oral every 6 hours PRN Temp greater or equal to 38C (100.4F), Mild Pain (1 - 3)  albuterol    90 MICROgram(s) HFA Inhaler 2 Puff(s) Inhalation every 6 hours PRN Shortness of Breath and/or Wheezing  aluminum hydroxide/magnesium hydroxide/simethicone Suspension 30 milliLiter(s) Oral every 4 hours PRN Dyspepsia  bisacodyl 5 milliGRAM(s) Oral every 12 hours PRN Constipation  dextrose Oral Gel 15 Gram(s) Oral once PRN Blood Glucose LESS THAN 70 milliGRAM(s)/deciliter  OLANZapine Injectable 1.25 milliGRAM(s) IntraMuscular every 6 hours PRN agitation  simethicone 80 milliGRAM(s) Chew three times a day PRN Gas      I&O's Summary      PHYSICAL EXAM:  Vital Signs Last 24 Hrs  T(C): 36.4 (09 Oct 2024 10:58), Max: 36.6 (08 Oct 2024 22:30)  T(F): 97.6 (09 Oct 2024 10:58), Max: 97.8 (08 Oct 2024 22:30)  HR: 78 (09 Oct 2024 10:58) (68 - 78)  BP: 133/86 (09 Oct 2024 10:58) (133/86 - 146/84)  BP(mean): --  RR: 18 (09 Oct 2024 10:58) (18 - 18)  SpO2: 99% (09 Oct 2024 10:58) (99% - 100%)    Parameters below as of 09 Oct 2024 10:58  Patient On (Oxygen Delivery Method): room air      CONSTITUTIONAL: NAD   EYES: conjunctiva and sclera clear  ENMT: Moist oral mucosa, poor dentition. Missing most teeth   NECK: Supple   RESPIRATORY: Normal respiratory effort; lungs are clear to auscultation bilaterally  CARDIOVASCULAR: Regular rate and rhythm, normal S1 and S2, no murmur/rub/gallop; Peripheral pulses are 2+ bilaterally  ABDOMEN: Nontender to palpation, normoactive bowel sounds, no rebound/guarding   MUSCULOSKELETAL: No lower extremity edema   PSYCH: affect appropriate  NEUROLOGY: moves all extremities   SKIN: No rashes    LABS:                    COVID-19 PCR: NotDetec (07 Oct 2024 12:01)  COVID-19 PCR: NotDetec (04 Oct 2024 04:45)    COORDINATION OF CARE:  Communication: discussed plan of care with ACP

## 2024-10-09 NOTE — PROGRESS NOTE ADULT - PROBLEM SELECTOR PLAN 1
- appears at baseline  - events leading to transfer from [facility] noted and stated as above, now improving back to aaox3 and following commands; sp stroke code with cta, cth, and ct perfusion neg for acute pathology  - neuro consulted; recs appreciated; EEG without seizure activity  - toxic metabolic encephalopathy ; infectious w/u thus far negative; echo unremarkable  - continue valproic acid sprinkles 750mg BID  - continue Seroquel 75 mg bid  - Next Prolixin Dec 12.5 mg IM q2w due 10/23/24  - pt declined MRI, neurology does not need this to be completed and has low suspicion for CVA  - awaiting bed at [facility]    #dilated pupil  -baseline anisocoria (OD 8mm, OS 1mm) per chart review

## 2024-10-09 NOTE — BH CONSULTATION LIAISON PROGRESS NOTE - NSBHFUPINTERVALHXFT_PSY_A_CORE
Met with the patient. Angry, paranoid, suspicious- ' you related to Mundo?'. Continues to express her suspicions about the [facility] psychiatrist. Quickly becomes angry, and dismisses MD.

## 2024-10-09 NOTE — CHART NOTE - NSCHARTNOTEFT_GEN_A_CORE
Patient due for Fluphenazine Decanoate 12.5mg IM today.     Ensure that admission qtc was <500    Gilda Johnston MD  Attending psychiatrist

## 2024-10-09 NOTE — PROGRESS NOTE ADULT - PROBLEM SELECTOR PLAN 2
- admission at [facility] since May for it  - was on fluphanzine prn for agitation there, will monitor for now and tx symptomatically for agitation based on team assessment  - mgmt with meds as above  - c/w melatonin qhs  -to return to [facility], awaiting placement

## 2024-10-09 NOTE — BH CONSULTATION LIAISON PROGRESS NOTE - NSBHASSESSMENTFT_PSY_ALL_CORE
Ms Azul is a 74 yo F w history of SCZ, Parkinson's dz, hypothyroidism, and multiple psych hospitalizations who presents for AMS. Pt transferred from [facility], where she was found to have fallen and was covered in urine. Pt has been hospitalized in [facility] with occasional stays at [facility] for medical concerns since the start of this year for psychosis. Per [facility] notes, pt appears to be awaiting transfer to [facility] hospital. Pt has been delusional at [facility] with recent delusions surrounding pregnancy. Current medications are Depakote 750 mg bid, Seroquel 50 mg bid, and Prolixin Dec 12.5 mg IM q2w (last dose 9/24). Psychiatry was consulted for continued psychiatric care.    9/30: Pt continues to be paranoid but no agitation noted. EEG done; no seizures recorded. Pending ongoing medical work up.  10/1: Pt continues to be paranoid; no prns required. Ongoing medial work up; once done plan to return to [facility].  10/2: No change today. Once optimized, will return to [facility]. 2PC completed.  10/3: No changes. Continue meds as is.  10/4: Agitation yesterday requiring prn and restraints. Okay today but continues to be paranoid. Pending transfer to [facility]. Low VPA level but missed dose yesterday; not accurate level.  10/7: Agitated over weekend, requiring restraints and prns. Increase Seroquel for paranoia/agitation. Once pt has been taking consistent dose of Depakote, repeat level. Pending [facility] transfer.    10/9-- paranoid, agitated easily    Recs:  - Continue Depakote 750 mg bid  - Continue Seroquel 75 mg bid  	- hold if QTC > 500  - GIVE Prolixin Dec 12.5 mg IM TODAY----10/9  - consider Zyprexa 1.25 mg po/IM q6h prn for agitation if warranted; may give additional Zyprexa 1.25 mg if first dose is ineffective. hold for QTC > 500  -Continue to monitor VPA level   - Obs: started on 1:1 by team, recommend to  continue CO 1:1 for impulsivity/recent agitation  - Dispo: TBD,  return to [facility] once medically optimized; 2PC completed and in the chart 10/2/24

## 2024-10-10 LAB
GLUCOSE BLDC GLUCOMTR-MCNC: 147 MG/DL — HIGH (ref 70–99)
GLUCOSE BLDC GLUCOMTR-MCNC: 151 MG/DL — HIGH (ref 70–99)
GLUCOSE BLDC GLUCOMTR-MCNC: 180 MG/DL — HIGH (ref 70–99)
GLUCOSE BLDC GLUCOMTR-MCNC: 206 MG/DL — HIGH (ref 70–99)
SARS-COV-2 RNA SPEC QL NAA+PROBE: SIGNIFICANT CHANGE UP

## 2024-10-10 PROCEDURE — 99232 SBSQ HOSP IP/OBS MODERATE 35: CPT

## 2024-10-10 RX ADMIN — FLUTICASONE PROPIONATE 1 SPRAY(S): 50 SPRAY, METERED NASAL at 05:41

## 2024-10-10 RX ADMIN — DIVALPROEX SODIUM 1000 MILLIGRAM(S): 250 TABLET, FILM COATED, EXTENDED RELEASE ORAL at 17:44

## 2024-10-10 RX ADMIN — Medication 6 MILLIGRAM(S): at 22:59

## 2024-10-10 RX ADMIN — Medication 1 TABLET(S): at 12:26

## 2024-10-10 RX ADMIN — DIVALPROEX SODIUM 1000 MILLIGRAM(S): 250 TABLET, FILM COATED, EXTENDED RELEASE ORAL at 05:41

## 2024-10-10 RX ADMIN — Medication 2: at 17:41

## 2024-10-10 RX ADMIN — Medication 1000 UNIT(S): at 12:27

## 2024-10-10 RX ADMIN — QUETIAPINE FUMARATE 75 MILLIGRAM(S): 200 TABLET ORAL at 22:59

## 2024-10-10 RX ADMIN — FLUTICASONE PROPIONATE 1 SPRAY(S): 50 SPRAY, METERED NASAL at 17:42

## 2024-10-10 RX ADMIN — QUETIAPINE FUMARATE 75 MILLIGRAM(S): 200 TABLET ORAL at 12:27

## 2024-10-10 RX ADMIN — Medication 40 MILLIGRAM(S): at 12:26

## 2024-10-10 RX ADMIN — Medication 75 MICROGRAM(S): at 05:41

## 2024-10-10 RX ADMIN — Medication 1: at 13:20

## 2024-10-10 RX ADMIN — CHLORHEXIDINE GLUCONATE 1 APPLICATION(S): 40 SOLUTION TOPICAL at 05:51

## 2024-10-10 RX ADMIN — Medication 2 TABLET(S): at 22:58

## 2024-10-10 RX ADMIN — Medication 50 MILLIGRAM(S): at 05:41

## 2024-10-10 NOTE — PROGRESS NOTE ADULT - PROBLEM SELECTOR PLAN 2
- admission at [facility] since May for it  - was on fluphanzine prn for agitation there, will monitor for now and tx symptomatically for agitation based on team assessment  - mgmt with meds as above  - c/w melatonin qhs  -to return to [facility], awaiting placement

## 2024-10-10 NOTE — PROGRESS NOTE ADULT - PROBLEM SELECTOR PLAN 1
- appears at baseline  - events leading to transfer from [facility] noted and stated as above, now improving back to aaox3 and following commands; sp stroke code with cta, cth, and ct perfusion neg for acute pathology  - neuro consulted; recs appreciated; EEG without seizure activity  - toxic metabolic encephalopathy ; infectious w/u thus far negative; echo unremarkable  - continue valproic acid sprinkles 750mg BID  - continue Seroquel 75 mg bid  - Next Prolixin Dec 12.5 mg IM q2w due 10/23/24  - pt declined MRI, neurology does not need this to be completed and has low suspicion for CVA  - awaiting bed at [facility]    #dilated pupil  -baseline anisocoria (OD 8mm, OS 1mm) per chart review

## 2024-10-10 NOTE — PROGRESS NOTE ADULT - SUBJECTIVE AND OBJECTIVE BOX
Keven Irizarry MD  [facility] Division of Hospital Medicine  Pager: [phone]  Available via MS Teams    SUBJECTIVE / OVERNIGHT EVENTS:    continues to have concerns about teeth     MEDICATIONS  (STANDING):  chlorhexidine 2% Cloths 1 Application(s) Topical <User Schedule>  cholecalciferol 1000 Unit(s) Oral daily  dextrose 5%. 1000 milliLiter(s) (50 mL/Hr) IV Continuous <Continuous>  dextrose 5%. 1000 milliLiter(s) (100 mL/Hr) IV Continuous <Continuous>  dextrose 50% Injectable 25 Gram(s) IV Push once  dextrose 50% Injectable 12.5 Gram(s) IV Push once  dextrose 50% Injectable 25 Gram(s) IV Push once  divalproex Sprinkle 1000 milliGRAM(s) Oral two times a day  enoxaparin Injectable 40 milliGRAM(s) SubCutaneous every 24 hours  fluticasone propionate 50 MICROgram(s)/spray Nasal Spray 1 Spray(s) Both Nostrils two times a day  glucagon  Injectable 1 milliGRAM(s) IntraMuscular once  insulin lispro (ADMELOG) corrective regimen sliding scale   SubCutaneous at bedtime  insulin lispro (ADMELOG) corrective regimen sliding scale   SubCutaneous three times a day before meals  levothyroxine 75 MICROGram(s) Oral daily  levothyroxine      melatonin 6 milliGRAM(s) Oral at bedtime  metoprolol succinate ER 50 milliGRAM(s) Oral daily  multivitamin 1 Tablet(s) Oral daily  QUEtiapine 75 milliGRAM(s) Oral daily  QUEtiapine 75 milliGRAM(s) Oral at bedtime  senna 2 Tablet(s) Oral at bedtime    MEDICATIONS  (PRN):  acetaminophen     Tablet .. 650 milliGRAM(s) Oral every 6 hours PRN Temp greater or equal to 38C (100.4F), Mild Pain (1 - 3)  albuterol    90 MICROgram(s) HFA Inhaler 2 Puff(s) Inhalation every 6 hours PRN Shortness of Breath and/or Wheezing  aluminum hydroxide/magnesium hydroxide/simethicone Suspension 30 milliLiter(s) Oral every 4 hours PRN Dyspepsia  bisacodyl 5 milliGRAM(s) Oral every 12 hours PRN Constipation  dextrose Oral Gel 15 Gram(s) Oral once PRN Blood Glucose LESS THAN 70 milliGRAM(s)/deciliter  OLANZapine Injectable 1.25 milliGRAM(s) IntraMuscular every 6 hours PRN agitation  simethicone 80 milliGRAM(s) Chew three times a day PRN Gas      I&O's Summary    09 Oct 2024 07:01  -  10 Oct 2024 07:00  --------------------------------------------------------  IN: 0 mL / OUT: 900 mL / NET: -900 mL        PHYSICAL EXAM:  Vital Signs Last 24 Hrs  T(C): 36.6 (10 Oct 2024 09:35), Max: 36.6 (09 Oct 2024 22:30)  T(F): 97.8 (10 Oct 2024 09:35), Max: 97.9 (10 Oct 2024 02:30)  HR: 80 (10 Oct 2024 09:35) (64 - 80)  BP: 134/82 (10 Oct 2024 09:35) (134/82 - 151/72)  BP(mean): --  RR: 18 (10 Oct 2024 09:35) (18 - 18)  SpO2: 98% (10 Oct 2024 09:35) (98% - 99%)    Parameters below as of 10 Oct 2024 09:35  Patient On (Oxygen Delivery Method): room air      CONSTITUTIONAL: NAD   EYES: conjunctiva and sclera clear  ENMT: Moist oral mucosa   NECK: Supple   RESPIRATORY: Normal respiratory effort; lungs are clear to auscultation bilaterally  CARDIOVASCULAR: Regular rate and rhythm, normal S1 and S2, no murmur/rub/gallop; Peripheral pulses are 2+ bilaterally  ABDOMEN: Nontender to palpation, normoactive bowel sounds, no rebound/guarding   MUSCULOSKELETAL: No lower extremity edema   PSYCH: disorientated but redirectable   NEUROLOGY: moves all extremities   SKIN: No rashes    LABS:                    COVID-19 PCR: NotDetec (10 Oct 2024 09:30)  COVID-19 PCR: NotDetec (07 Oct 2024 12:01)  COVID-19 PCR: NotDetec (04 Oct 2024 04:45)      COORDINATION OF CARE:  Communication: discussed plan of care with ACP

## 2024-10-11 LAB
GLUCOSE BLDC GLUCOMTR-MCNC: 129 MG/DL — HIGH (ref 70–99)
GLUCOSE BLDC GLUCOMTR-MCNC: 163 MG/DL — HIGH (ref 70–99)
GLUCOSE BLDC GLUCOMTR-MCNC: 205 MG/DL — HIGH (ref 70–99)
GLUCOSE BLDC GLUCOMTR-MCNC: 257 MG/DL — HIGH (ref 70–99)

## 2024-10-11 PROCEDURE — 99232 SBSQ HOSP IP/OBS MODERATE 35: CPT

## 2024-10-11 PROCEDURE — 99231 SBSQ HOSP IP/OBS SF/LOW 25: CPT

## 2024-10-11 RX ORDER — QUETIAPINE FUMARATE 200 MG/1
3 TABLET ORAL
Qty: 0 | Refills: 0 | DISCHARGE
Start: 2024-10-11

## 2024-10-11 RX ORDER — DIVALPROEX SODIUM 250 MG/1
8 TABLET, FILM COATED, EXTENDED RELEASE ORAL
Qty: 0 | Refills: 0 | DISCHARGE
Start: 2024-10-11

## 2024-10-11 RX ORDER — DIVALPROEX SODIUM 250 MG/1
6 TABLET, FILM COATED, EXTENDED RELEASE ORAL
Refills: 0 | DISCHARGE

## 2024-10-11 RX ORDER — B-COMPLEX WITH VITAMIN C
1 VIAL (ML) INJECTION
Refills: 0 | DISCHARGE

## 2024-10-11 RX ORDER — ACETAMINOPHEN 500 MG
2 TABLET ORAL
Qty: 0 | Refills: 0 | DISCHARGE
Start: 2024-10-11

## 2024-10-11 RX ORDER — QUETIAPINE FUMARATE 200 MG/1
1 TABLET ORAL
Refills: 0 | DISCHARGE

## 2024-10-11 RX ORDER — QUETIAPINE FUMARATE 200 MG/1
25 TABLET ORAL
Refills: 0 | DISCHARGE

## 2024-10-11 RX ADMIN — DIVALPROEX SODIUM 1000 MILLIGRAM(S): 250 TABLET, FILM COATED, EXTENDED RELEASE ORAL at 17:05

## 2024-10-11 RX ADMIN — QUETIAPINE FUMARATE 75 MILLIGRAM(S): 200 TABLET ORAL at 23:00

## 2024-10-11 RX ADMIN — Medication 6 MILLIGRAM(S): at 23:00

## 2024-10-11 RX ADMIN — OLANZAPINE 1.25 MILLIGRAM(S): 20 TABLET ORAL at 18:54

## 2024-10-11 RX ADMIN — Medication 2 TABLET(S): at 23:00

## 2024-10-11 RX ADMIN — CHLORHEXIDINE GLUCONATE 1 APPLICATION(S): 40 SOLUTION TOPICAL at 07:47

## 2024-10-11 RX ADMIN — Medication 75 MICROGRAM(S): at 07:47

## 2024-10-11 RX ADMIN — Medication 50 MILLIGRAM(S): at 07:45

## 2024-10-11 RX ADMIN — DIVALPROEX SODIUM 1000 MILLIGRAM(S): 250 TABLET, FILM COATED, EXTENDED RELEASE ORAL at 07:47

## 2024-10-11 RX ADMIN — Medication 2: at 11:54

## 2024-10-11 NOTE — PROGRESS NOTE ADULT - SUBJECTIVE AND OBJECTIVE BOX
Patient is a 73y old  Female who presents with a chief complaint of AMS (10 Oct 2024 13:22)      SUBJECTIVE / OVERNIGHT EVENTS:    MEDICATIONS  (STANDING):  chlorhexidine 2% Cloths 1 Application(s) Topical <User Schedule>  cholecalciferol 1000 Unit(s) Oral daily  dextrose 5%. 1000 milliLiter(s) (100 mL/Hr) IV Continuous <Continuous>  dextrose 5%. 1000 milliLiter(s) (50 mL/Hr) IV Continuous <Continuous>  dextrose 50% Injectable 25 Gram(s) IV Push once  dextrose 50% Injectable 12.5 Gram(s) IV Push once  dextrose 50% Injectable 25 Gram(s) IV Push once  divalproex Sprinkle 1000 milliGRAM(s) Oral two times a day  enoxaparin Injectable 40 milliGRAM(s) SubCutaneous every 24 hours  fluticasone propionate 50 MICROgram(s)/spray Nasal Spray 1 Spray(s) Both Nostrils two times a day  glucagon  Injectable 1 milliGRAM(s) IntraMuscular once  insulin lispro (ADMELOG) corrective regimen sliding scale   SubCutaneous three times a day before meals  insulin lispro (ADMELOG) corrective regimen sliding scale   SubCutaneous at bedtime  levothyroxine 75 MICROGram(s) Oral daily  levothyroxine      melatonin 6 milliGRAM(s) Oral at bedtime  metoprolol succinate ER 50 milliGRAM(s) Oral daily  multivitamin 1 Tablet(s) Oral daily  QUEtiapine 75 milliGRAM(s) Oral daily  QUEtiapine 75 milliGRAM(s) Oral at bedtime  senna 2 Tablet(s) Oral at bedtime    MEDICATIONS  (PRN):  acetaminophen     Tablet .. 650 milliGRAM(s) Oral every 6 hours PRN Temp greater or equal to 38C (100.4F), Mild Pain (1 - 3)  albuterol    90 MICROgram(s) HFA Inhaler 2 Puff(s) Inhalation every 6 hours PRN Shortness of Breath and/or Wheezing  aluminum hydroxide/magnesium hydroxide/simethicone Suspension 30 milliLiter(s) Oral every 4 hours PRN Dyspepsia  bisacodyl 5 milliGRAM(s) Oral every 12 hours PRN Constipation  dextrose Oral Gel 15 Gram(s) Oral once PRN Blood Glucose LESS THAN 70 milliGRAM(s)/deciliter  OLANZapine Injectable 1.25 milliGRAM(s) IntraMuscular every 6 hours PRN agitation  simethicone 80 milliGRAM(s) Chew three times a day PRN Gas      Vital Signs Last 24 Hrs  T(C): 36.7 (10 Oct 2024 22:50), Max: 37.1 (10 Oct 2024 13:30)  T(F): 98 (10 Oct 2024 22:50), Max: 98.8 (10 Oct 2024 13:30)  HR: 82 (10 Oct 2024 22:50) (82 - 100)  BP: 143/87 (10 Oct 2024 22:50) (138/81 - 143/87)  BP(mean): --  RR: 18 (10 Oct 2024 22:50) (18 - 18)  SpO2: 97% (10 Oct 2024 22:50) (97% - 98%)    Parameters below as of 10 Oct 2024 22:50  Patient On (Oxygen Delivery Method): room air      CAPILLARY BLOOD GLUCOSE      POCT Blood Glucose.: 163 mg/dL (11 Oct 2024 09:14)  POCT Blood Glucose.: 180 mg/dL (10 Oct 2024 21:31)  POCT Blood Glucose.: 206 mg/dL (10 Oct 2024 17:32)  POCT Blood Glucose.: 151 mg/dL (10 Oct 2024 12:26)    I&O's Summary      PHYSICAL EXAM:  GENERAL: NAD, well-developed  HEAD:  Atraumatic, Normocephalic  EYES: EOMI, PERRLA, conjunctiva and sclera clear  NECK: Supple, No JVD  CHEST/LUNG: Clear to auscultation bilaterally; No wheeze  HEART: Regular rate and rhythm; No murmurs, rubs, or gallops  ABDOMEN: Soft, Nontender, Nondistended; Bowel sounds present  EXTREMITIES:  2+ Peripheral Pulses, No clubbing, cyanosis, or edema  PSYCH: AAOx3  NEUROLOGY: non-focal  SKIN: No rashes or lesions    LABS:                    RADIOLOGY & ADDITIONAL TESTS:    Imaging Personally Reviewed:    Consultant(s) Notes Reviewed:      Care Discussed with Consultants/Other Providers:   Patient is a 73y old  Female who presents with a chief complaint of AMS (10 Oct 2024 13:22)      SUBJECTIVE / OVERNIGHT EVENTS: patient seen and examined by bedside, pt alert awake , aware of being in hospital,        MEDICATIONS  (STANDING):  chlorhexidine 2% Cloths 1 Application(s) Topical <User Schedule>  cholecalciferol 1000 Unit(s) Oral daily  dextrose 5%. 1000 milliLiter(s) (100 mL/Hr) IV Continuous <Continuous>  dextrose 5%. 1000 milliLiter(s) (50 mL/Hr) IV Continuous <Continuous>  dextrose 50% Injectable 25 Gram(s) IV Push once  dextrose 50% Injectable 12.5 Gram(s) IV Push once  dextrose 50% Injectable 25 Gram(s) IV Push once  divalproex Sprinkle 1000 milliGRAM(s) Oral two times a day  enoxaparin Injectable 40 milliGRAM(s) SubCutaneous every 24 hours  fluticasone propionate 50 MICROgram(s)/spray Nasal Spray 1 Spray(s) Both Nostrils two times a day  glucagon  Injectable 1 milliGRAM(s) IntraMuscular once  insulin lispro (ADMELOG) corrective regimen sliding scale   SubCutaneous three times a day before meals  insulin lispro (ADMELOG) corrective regimen sliding scale   SubCutaneous at bedtime  levothyroxine 75 MICROGram(s) Oral daily  levothyroxine      melatonin 6 milliGRAM(s) Oral at bedtime  metoprolol succinate ER 50 milliGRAM(s) Oral daily  multivitamin 1 Tablet(s) Oral daily  QUEtiapine 75 milliGRAM(s) Oral daily  QUEtiapine 75 milliGRAM(s) Oral at bedtime  senna 2 Tablet(s) Oral at bedtime    MEDICATIONS  (PRN):  acetaminophen     Tablet .. 650 milliGRAM(s) Oral every 6 hours PRN Temp greater or equal to 38C (100.4F), Mild Pain (1 - 3)  albuterol    90 MICROgram(s) HFA Inhaler 2 Puff(s) Inhalation every 6 hours PRN Shortness of Breath and/or Wheezing  aluminum hydroxide/magnesium hydroxide/simethicone Suspension 30 milliLiter(s) Oral every 4 hours PRN Dyspepsia  bisacodyl 5 milliGRAM(s) Oral every 12 hours PRN Constipation  dextrose Oral Gel 15 Gram(s) Oral once PRN Blood Glucose LESS THAN 70 milliGRAM(s)/deciliter  OLANZapine Injectable 1.25 milliGRAM(s) IntraMuscular every 6 hours PRN agitation  simethicone 80 milliGRAM(s) Chew three times a day PRN Gas      Vital Signs Last 24 Hrs  T(C): 36.7 (10 Oct 2024 22:50), Max: 37.1 (10 Oct 2024 13:30)  T(F): 98 (10 Oct 2024 22:50), Max: 98.8 (10 Oct 2024 13:30)  HR: 82 (10 Oct 2024 22:50) (82 - 100)  BP: 143/87 (10 Oct 2024 22:50) (138/81 - 143/87)  BP(mean): --  RR: 18 (10 Oct 2024 22:50) (18 - 18)  SpO2: 97% (10 Oct 2024 22:50) (97% - 98%)    Parameters below as of 10 Oct 2024 22:50  Patient On (Oxygen Delivery Method): room air      CAPILLARY BLOOD GLUCOSE      POCT Blood Glucose.: 163 mg/dL (11 Oct 2024 09:14)  POCT Blood Glucose.: 180 mg/dL (10 Oct 2024 21:31)  POCT Blood Glucose.: 206 mg/dL (10 Oct 2024 17:32)  POCT Blood Glucose.: 151 mg/dL (10 Oct 2024 12:26)    I&O's Summary      PHYSICAL EXAM:  GENERAL: NAD,  HEAD:  Atraumatic, Normocephalic  EYES: EOMI, dilated pupils , conjunctiva and sclera clear  CHEST/LUNG: Clear to auscultation bilaterally; No wheeze  HEART: Regular rate and rhythm; No murmurs, rubs, or gallops  ABDOMEN: Soft, Nontender, Nondistended; Bowel sounds present  EXTREMITIES:  2+ Peripheral Pulses, No clubbing, cyanosis, or edema  PSYCH: Awake , alert , aware of being in hospital , mildly agitated   NEUROLOGY: non-focal      LABS:        no new labs             RADIOLOGY & ADDITIONAL TESTS:    Imaging Personally Reviewed:    Consultant(s) Notes Reviewed:      Care Discussed with Consultants/Other Providers:

## 2024-10-11 NOTE — PROGRESS NOTE ADULT - PROBLEM SELECTOR PLAN 8
- DVT ppx: lovenox  - Diet: minced and moist  - Full code - DVT ppx: lovenox  - Diet: minced and moist  - Full code  Plan of care d/w pt and ACP

## 2024-10-11 NOTE — PROGRESS NOTE ADULT - PROBLEM SELECTOR PLAN 2
- admission at [facility] since May for it  - was on fluphanzine prn for agitation there, will monitor for now and tx symptomatically for agitation based on team assessment  - mgmt with meds as above  - c/w melatonin qhs  -to return to [facility], awaiting placement

## 2024-10-11 NOTE — PROGRESS NOTE ADULT - PROBLEM SELECTOR PLAN 1
- appears at baseline  - events leading to transfer from [facility] noted and stated as above, now improving back to aaox3 and following commands; sp stroke code with cta, cth, and ct perfusion neg for acute pathology  - neuro consulted; recs appreciated; EEG without seizure activity  - toxic metabolic encephalopathy ; infectious w/u thus far negative; echo unremarkable  - continue valproic acid sprinkles 750mg BID  - continue Seroquel 75 mg bid  - Next Prolixin Dec 12.5 mg IM q2w due 10/23/24  - pt declined MRI, neurology does not need this to be completed and has low suspicion for CVA  - awaiting bed at [facility]    #dilated pupil  -baseline anisocoria (OD 8mm, OS 1mm) per chart review

## 2024-10-11 NOTE — BH CONSULTATION LIAISON PROGRESS NOTE - NSBHASSESSMENTFT_PSY_ALL_CORE
Ms Jorge Alberto is a 74 yo F w history of SCZ, Parkinson's dz, hypothyroidism, and multiple psych hospitalizations who presents for AMS. Pt transferred from [facility], where she was found to have fallen and was covered in urine. Pt has been hospitalized in [facility] with occasional stays at [facility] for medical concerns since the start of this year for psychosis. Per [facility] notes, pt appears to be awaiting transfer to [facility] hospital. Pt has been delusional at [facility] with recent delusions surrounding pregnancy. Current medications are Depakote 750 mg bid, Seroquel 50 mg bid, and Prolixin Dec 12.5 mg IM q2w (last dose 9/24). Psychiatry was consulted for continued psychiatric care.    9/30: Pt continues to be paranoid but no agitation noted. EEG done; no seizures recorded. Pending ongoing medical work up.  10/1: Pt continues to be paranoid; no prns required. Ongoing medial work up; once done plan to return to [facility].  10/2: No change today. Once optimized, will return to [facility]. 2PC completed.  10/3: No changes. Continue meds as is.  10/4: Agitation yesterday requiring prn and restraints. Okay today but continues to be paranoid. Pending transfer to [facility]. Low VPA level but missed dose yesterday; not accurate level.  10/7: Agitated over weekend, requiring restraints and prns. Increase Seroquel for paranoia/agitation. Once pt has been taking consistent dose of Depakote, repeat level. Pending [facility] transfer.  10/9-- paranoid, agitated easily  10/11: Continued paranoia, irritability, delusions. Pending [facility] transfer. 2PC updated and in the chart.    Recs:  - Continue Depakote 750 mg bid  - Continue Seroquel 75 mg bid  	- hold if QTC > 500  - Continue Prolixin Dec 12.5 mg IM last dose10/9  - consider Zyprexa 1.25 mg po/IM q6h prn for agitation if warranted; may give additional Zyprexa 1.25 mg if first dose is ineffective. hold for QTC > 500  - Continue to monitor VPA level   - Obs: started on 1:1 by team, recommend to  continue CO 1:1 for impulsivity/recent agitation  - Dispo: TBD,  return to [facility] once medically optimized; 2PC completed and in the chart 10/2/24   Ms Jorge Alberto is a 74 yo F w history of SCZ, Parkinson's dz, hypothyroidism, and multiple psych hospitalizations who presents for AMS. Pt transferred from [facility], where she was found to have fallen and was covered in urine. Pt has been hospitalized in [facility] with occasional stays at [facility] for medical concerns since the start of this year for psychosis. Per [facility] notes, pt appears to be awaiting transfer to [facility] hospital. Pt has been delusional at [facility] with recent delusions surrounding pregnancy. Current medications are Depakote 750 mg bid, Seroquel 50 mg bid, and Prolixin Dec 12.5 mg IM q2w (last dose 9/24). Psychiatry was consulted for continued psychiatric care.    9/30: Pt continues to be paranoid but no agitation noted. EEG done; no seizures recorded. Pending ongoing medical work up.  10/1: Pt continues to be paranoid; no prns required. Ongoing medial work up; once done plan to return to [facility].  10/2: No change today. Once optimized, will return to [facility]. 2PC completed.  10/3: No changes. Continue meds as is.  10/4: Agitation yesterday requiring prn and restraints. Okay today but continues to be paranoid. Pending transfer to [facility]. Low VPA level but missed dose yesterday; not accurate level.  10/7: Agitated over weekend, requiring restraints and prns. Increase Seroquel for paranoia/agitation. Once pt has been taking consistent dose of Depakote, repeat level. Pending [facility] transfer.  10/9-- paranoid, agitated easily  10/11: Continued paranoia, irritability, delusions. Pending [facility] transfer. 2PC updated and in the chart.    Recs:  - Continue Depakote 750 mg bid  - Continue Seroquel 75 mg bid  	- hold if QTC > 500  - Continue Prolixin Dec 12.5 mg IM last dose10/9  - consider Zyprexa 1.25 mg po/IM q6h prn for agitation if warranted; may give additional Zyprexa 1.25 mg if first dose is ineffective. hold for QTC > 500  - Continue to monitor VPA level   - Obs: started on 1:1 by team, recommend to  continue CO 1:1 for impulsivity/recent agitation  - Dispo: TBD,  return to [facility] once medically optimized; 2PC completed and in the chart 10/11/24

## 2024-10-12 LAB
GLUCOSE BLDC GLUCOMTR-MCNC: 113 MG/DL — HIGH (ref 70–99)
GLUCOSE BLDC GLUCOMTR-MCNC: 201 MG/DL — HIGH (ref 70–99)

## 2024-10-12 PROCEDURE — 99232 SBSQ HOSP IP/OBS MODERATE 35: CPT

## 2024-10-12 RX ADMIN — Medication 1000 UNIT(S): at 14:16

## 2024-10-12 RX ADMIN — DIVALPROEX SODIUM 1000 MILLIGRAM(S): 250 TABLET, FILM COATED, EXTENDED RELEASE ORAL at 17:23

## 2024-10-12 RX ADMIN — OLANZAPINE 1.25 MILLIGRAM(S): 20 TABLET ORAL at 21:01

## 2024-10-12 RX ADMIN — Medication 40 MILLIGRAM(S): at 14:13

## 2024-10-12 RX ADMIN — Medication 1 TABLET(S): at 14:16

## 2024-10-12 RX ADMIN — QUETIAPINE FUMARATE 75 MILLIGRAM(S): 200 TABLET ORAL at 14:13

## 2024-10-12 RX ADMIN — Medication 2: at 18:55

## 2024-10-12 NOTE — PROVIDER CONTACT NOTE (OTHER) - ASSESSMENT
patient A&Ox1-2. No s/s of chest pain / SOB. patient given PRN Zyprexa at 21:01. patient A&Ox1-2. No s/s of chest pain / SOB. patient given PRN Zyprexa at 21:01, with assistance of security at bedside.

## 2024-10-12 NOTE — PROVIDER CONTACT NOTE (OTHER) - SITUATION
patient refusing all interventions at this time, including vitals/blood glucose monitoring/ medications. patient becomes agitated / screaming / hitting staff whenever you go into her room.

## 2024-10-12 NOTE — PROGRESS NOTE ADULT - SUBJECTIVE AND OBJECTIVE BOX
Keven Irizarry MD  [facility] Division of Hospital Medicine  Pager: [phone]  Available via MS Teams    SUBJECTIVE / OVERNIGHT EVENTS:    complains of mild teeth pain       MEDICATIONS  (STANDING):  chlorhexidine 2% Cloths 1 Application(s) Topical <User Schedule>  cholecalciferol 1000 Unit(s) Oral daily  dextrose 5%. 1000 milliLiter(s) (100 mL/Hr) IV Continuous <Continuous>  dextrose 5%. 1000 milliLiter(s) (50 mL/Hr) IV Continuous <Continuous>  dextrose 50% Injectable 25 Gram(s) IV Push once  dextrose 50% Injectable 12.5 Gram(s) IV Push once  dextrose 50% Injectable 25 Gram(s) IV Push once  divalproex Sprinkle 1000 milliGRAM(s) Oral two times a day  enoxaparin Injectable 40 milliGRAM(s) SubCutaneous every 24 hours  fluticasone propionate 50 MICROgram(s)/spray Nasal Spray 1 Spray(s) Both Nostrils two times a day  glucagon  Injectable 1 milliGRAM(s) IntraMuscular once  insulin lispro (ADMELOG) corrective regimen sliding scale   SubCutaneous at bedtime  insulin lispro (ADMELOG) corrective regimen sliding scale   SubCutaneous three times a day before meals  levothyroxine 75 MICROGram(s) Oral daily  levothyroxine      melatonin 6 milliGRAM(s) Oral at bedtime  metoprolol succinate ER 50 milliGRAM(s) Oral daily  multivitamin 1 Tablet(s) Oral daily  QUEtiapine 75 milliGRAM(s) Oral at bedtime  QUEtiapine 75 milliGRAM(s) Oral daily  senna 2 Tablet(s) Oral at bedtime    MEDICATIONS  (PRN):  acetaminophen     Tablet .. 650 milliGRAM(s) Oral every 6 hours PRN Temp greater or equal to 38C (100.4F), Mild Pain (1 - 3)  albuterol    90 MICROgram(s) HFA Inhaler 2 Puff(s) Inhalation every 6 hours PRN Shortness of Breath and/or Wheezing  aluminum hydroxide/magnesium hydroxide/simethicone Suspension 30 milliLiter(s) Oral every 4 hours PRN Dyspepsia  bisacodyl 5 milliGRAM(s) Oral every 12 hours PRN Constipation  dextrose Oral Gel 15 Gram(s) Oral once PRN Blood Glucose LESS THAN 70 milliGRAM(s)/deciliter  OLANZapine Injectable 1.25 milliGRAM(s) IntraMuscular every 6 hours PRN agitation  simethicone 80 milliGRAM(s) Chew three times a day PRN Gas      I&O's Summary    11 Oct 2024 07:01  -  12 Oct 2024 07:00  --------------------------------------------------------  IN: 100 mL / OUT: 0 mL / NET: 100 mL        PHYSICAL EXAM:  Vital Signs Last 24 Hrs  T(C): 37 (12 Oct 2024 09:56), Max: 37 (12 Oct 2024 09:56)  T(F): 98.6 (12 Oct 2024 09:56), Max: 98.6 (12 Oct 2024 09:56)  HR: 95 (12 Oct 2024 09:56) (80 - 95)  BP: 137/85 (12 Oct 2024 09:56) (137/85 - 151/96)  BP(mean): --  RR: 18 (12 Oct 2024 09:56) (18 - 18)  SpO2: 97% (12 Oct 2024 09:56) (97% - 100%)    Parameters below as of 12 Oct 2024 09:56  Patient On (Oxygen Delivery Method): room air      CONSTITUTIONAL: NAD   EYES: conjunctiva and sclera clear  ENMT: Moist oral mucosa   NECK: Supple   RESPIRATORY: Normal respiratory effort; lungs are clear to auscultation bilaterally  CARDIOVASCULAR: Regular rate and rhythm, normal S1 and S2, no murmur/rub/gallop; Peripheral pulses are 2+ bilaterally  ABDOMEN: Nontender to palpation, normoactive bowel sounds, no rebound/guarding   MUSCULOSKELETAL: No lower extremity edema   PSYCH: affect appropriate  NEUROLOGY: moves all extremities   SKIN: No rashes    LABS:                    COVID-19 PCR: NotDetec (10 Oct 2024 09:30)  COVID-19 PCR: NotDetec (07 Oct 2024 12:01)  COVID-19 PCR: NotDetec (04 Oct 2024 04:45)      RADIOLOGY & ADDITIONAL TESTS:  Other Results Reviewed Today: BMP with stable Cr, CBC with stable Hg     COORDINATION OF CARE:  Communication: discussed plan of care with ACP

## 2024-10-12 NOTE — PROGRESS NOTE ADULT - PROBLEM SELECTOR PLAN 1
- appears at baseline  - events leading to transfer from [facility] noted and stated as above, now improving back to aaox3 and following commands; sp stroke code with cta, cth, and ct perfusion neg for acute pathology  - neuro consulted; recs appreciated; EEG without seizure activity  - toxic metabolic encephalopathy ; infectious w/u thus far negative; echo unremarkable  - continue valproic acid sprinkles 750mg BID  - continue Seroquel 75 mg bid  - Next Prolixin Dec 12.5 mg IM q2w due 10/23/24  - pt declined MRI, neurology does not need this to be completed and has low suspicion for CVA  - awaiting bed at [facility]    #dilated pupil  -baseline anisocoria (OD 8mm, OS 1mm) per chart review

## 2024-10-12 NOTE — PROVIDER CONTACT NOTE (OTHER) - ACTION/TREATMENT ORDERED:
continue to monitor. care ongoing. will continue to attempt vitals/meds/ fingerstick. continue to monitor. care ongoing. OK to daphne medications as not done at this time, vitals marked as refused. Constant observation maintained. Will continue to attempt vitals/meds/ fingerstick.

## 2024-10-12 NOTE — PROGRESS NOTE ADULT - PROBLEM SELECTOR PLAN 2
- admission at [facility] since May for it  - was on fluphanzine prn for agitation there, will monitor for now and tx symptomatically for agitation based on team assessment  - mgmt with meds as above  - c/w melatonin qhs  -to return to [facility], awaiting placement

## 2024-10-12 NOTE — PROVIDER CONTACT NOTE (OTHER) - SITUATION
Clarification before given AM medications. Is this patient going for any procedure, she was supposed to be NPO since yesterday [facility]. Please advise

## 2024-10-13 LAB
GLUCOSE BLDC GLUCOMTR-MCNC: 106 MG/DL — HIGH (ref 70–99)
GLUCOSE BLDC GLUCOMTR-MCNC: 140 MG/DL — HIGH (ref 70–99)
GLUCOSE BLDC GLUCOMTR-MCNC: 156 MG/DL — HIGH (ref 70–99)
GLUCOSE BLDC GLUCOMTR-MCNC: 245 MG/DL — HIGH (ref 70–99)

## 2024-10-13 PROCEDURE — 99232 SBSQ HOSP IP/OBS MODERATE 35: CPT

## 2024-10-13 RX ADMIN — Medication 1 TABLET(S): at 14:07

## 2024-10-13 RX ADMIN — Medication 40 MILLIGRAM(S): at 14:09

## 2024-10-13 RX ADMIN — QUETIAPINE FUMARATE 75 MILLIGRAM(S): 200 TABLET ORAL at 14:08

## 2024-10-13 RX ADMIN — Medication 75 MICROGRAM(S): at 05:30

## 2024-10-13 RX ADMIN — Medication 1000 UNIT(S): at 14:09

## 2024-10-13 RX ADMIN — DIVALPROEX SODIUM 1000 MILLIGRAM(S): 250 TABLET, FILM COATED, EXTENDED RELEASE ORAL at 05:32

## 2024-10-13 RX ADMIN — Medication 50 MILLIGRAM(S): at 05:31

## 2024-10-13 RX ADMIN — CHLORHEXIDINE GLUCONATE 1 APPLICATION(S): 40 SOLUTION TOPICAL at 05:37

## 2024-10-13 NOTE — PROGRESS NOTE ADULT - PROBLEM SELECTOR PLAN 1
- appears at baseline  - events leading to transfer from [facility] noted and stated as above, now improving back to aaox3 and following commands; sp stroke code with cta, cth, and ct perfusion neg for acute pathology  - neuro consulted; recs appreciated; EEG without seizure activity  - toxic metabolic encephalopathy ; infectious w/u thus far negative; echo unremarkable  - continue valproic acid sprinkles 750mg BID  - continue Seroquel 75 mg bid  - Next Prolixin Dec 12.5 mg IM q2w due 10/23/24  - pt declined MRI, neurology does not need this to be completed and has low suspicion for CVA  - awaiting bed at [facility]    #dilated pupil  -baseline anisocoria (OD 8mm, OS 1mm) per chart review

## 2024-10-13 NOTE — PROGRESS NOTE ADULT - PROBLEM SELECTOR PLAN 2
- admission at [facility] since May for it  - was on fluphanzine prn for agitation there, will monitor for now and tx symptomatically for agitation based on team assessment  - mgmt with meds as above  - c/w melatonin qhs  -to return to [facility], awaiting placement

## 2024-10-13 NOTE — PROGRESS NOTE ADULT - SUBJECTIVE AND OBJECTIVE BOX
Keven Irizarry MD  [facility] Division of Hospital Medicine  Pager: [phone]  Available via MS Teams    SUBJECTIVE / OVERNIGHT EVENTS:    no new complaints     MEDICATIONS  (STANDING):  chlorhexidine 2% Cloths 1 Application(s) Topical <User Schedule>  cholecalciferol 1000 Unit(s) Oral daily  dextrose 5%. 1000 milliLiter(s) (100 mL/Hr) IV Continuous <Continuous>  dextrose 5%. 1000 milliLiter(s) (50 mL/Hr) IV Continuous <Continuous>  dextrose 50% Injectable 25 Gram(s) IV Push once  dextrose 50% Injectable 12.5 Gram(s) IV Push once  dextrose 50% Injectable 25 Gram(s) IV Push once  divalproex Sprinkle 1000 milliGRAM(s) Oral two times a day  enoxaparin Injectable 40 milliGRAM(s) SubCutaneous every 24 hours  fluticasone propionate 50 MICROgram(s)/spray Nasal Spray 1 Spray(s) Both Nostrils two times a day  glucagon  Injectable 1 milliGRAM(s) IntraMuscular once  insulin lispro (ADMELOG) corrective regimen sliding scale   SubCutaneous three times a day before meals  insulin lispro (ADMELOG) corrective regimen sliding scale   SubCutaneous at bedtime  levothyroxine 75 MICROGram(s) Oral daily  levothyroxine      melatonin 6 milliGRAM(s) Oral at bedtime  metoprolol succinate ER 50 milliGRAM(s) Oral daily  multivitamin 1 Tablet(s) Oral daily  QUEtiapine 75 milliGRAM(s) Oral at bedtime  QUEtiapine 75 milliGRAM(s) Oral daily  senna 2 Tablet(s) Oral at bedtime    MEDICATIONS  (PRN):  acetaminophen     Tablet .. 650 milliGRAM(s) Oral every 6 hours PRN Temp greater or equal to 38C (100.4F), Mild Pain (1 - 3)  albuterol    90 MICROgram(s) HFA Inhaler 2 Puff(s) Inhalation every 6 hours PRN Shortness of Breath and/or Wheezing  aluminum hydroxide/magnesium hydroxide/simethicone Suspension 30 milliLiter(s) Oral every 4 hours PRN Dyspepsia  bisacodyl 5 milliGRAM(s) Oral every 12 hours PRN Constipation  dextrose Oral Gel 15 Gram(s) Oral once PRN Blood Glucose LESS THAN 70 milliGRAM(s)/deciliter  OLANZapine Injectable 1.25 milliGRAM(s) IntraMuscular every 6 hours PRN agitation  simethicone 80 milliGRAM(s) Chew three times a day PRN Gas      I&O's Summary      PHYSICAL EXAM:  Vital Signs Last 24 Hrs  T(C): 36.7 (13 Oct 2024 05:17), Max: 36.7 (13 Oct 2024 05:17)  T(F): 98 (13 Oct 2024 05:17), Max: 98 (13 Oct 2024 05:17)  HR: 79 (13 Oct 2024 05:17) (79 - 79)  BP: 142/97 (13 Oct 2024 05:17) (142/97 - 142/97)  BP(mean): --  RR: 18 (13 Oct 2024 05:17) (18 - 18)  SpO2: 100% (13 Oct 2024 05:17) (100% - 100%)    Parameters below as of 13 Oct 2024 05:17  Patient On (Oxygen Delivery Method): room air      CONSTITUTIONAL: NAD   EYES: conjunctiva and sclera clear  ENMT: Moist oral mucosa   NECK: Supple   RESPIRATORY: Normal respiratory effort; lungs are clear to auscultation bilaterally  CARDIOVASCULAR: Regular rate and rhythm, normal S1 and S2, no murmur/rub/gallop; Peripheral pulses are 2+ bilaterally  ABDOMEN: Nontender to palpation, normoactive bowel sounds, no rebound/guarding   MUSCULOSKELETAL: No lower extremity edema   PSYCH: affect appropriate  NEUROLOGY: moves all extremities   SKIN: No rashes    LABS:                    COVID-19 PCR: NotDetec (10 Oct 2024 09:30)  COVID-19 PCR: NotDetec (07 Oct 2024 12:01)  COVID-19 PCR: NotDetec (04 Oct 2024 04:45)    COORDINATION OF CARE:  Communication: discussed plan of care with ACP

## 2024-10-14 LAB
GLUCOSE BLDC GLUCOMTR-MCNC: 125 MG/DL — HIGH (ref 70–99)
GLUCOSE BLDC GLUCOMTR-MCNC: 126 MG/DL — HIGH (ref 70–99)
GLUCOSE BLDC GLUCOMTR-MCNC: 150 MG/DL — HIGH (ref 70–99)
GLUCOSE BLDC GLUCOMTR-MCNC: 166 MG/DL — HIGH (ref 70–99)

## 2024-10-14 PROCEDURE — 99232 SBSQ HOSP IP/OBS MODERATE 35: CPT

## 2024-10-14 RX ORDER — OLANZAPINE 20 MG/1
2.5 TABLET ORAL ONCE
Refills: 0 | Status: DISCONTINUED | OUTPATIENT
Start: 2024-10-14 | End: 2024-10-14

## 2024-10-14 RX ORDER — OLANZAPINE 20 MG/1
3.75 TABLET ORAL EVERY 6 HOURS
Refills: 0 | Status: DISCONTINUED | OUTPATIENT
Start: 2024-10-14 | End: 2024-10-21

## 2024-10-14 RX ORDER — OLANZAPINE 20 MG/1
2.5 TABLET ORAL EVERY 6 HOURS
Refills: 0 | Status: DISCONTINUED | OUTPATIENT
Start: 2024-10-14 | End: 2024-10-14

## 2024-10-14 RX ORDER — OLANZAPINE 20 MG/1
2.5 TABLET ORAL ONCE
Refills: 0 | Status: COMPLETED | OUTPATIENT
Start: 2024-10-14 | End: 2024-10-14

## 2024-10-14 RX ADMIN — QUETIAPINE FUMARATE 75 MILLIGRAM(S): 200 TABLET ORAL at 21:46

## 2024-10-14 RX ADMIN — Medication 1 TABLET(S): at 12:01

## 2024-10-14 RX ADMIN — Medication 40 MILLIGRAM(S): at 11:59

## 2024-10-14 RX ADMIN — Medication 6 MILLIGRAM(S): at 21:44

## 2024-10-14 RX ADMIN — Medication 1000 UNIT(S): at 12:06

## 2024-10-14 RX ADMIN — OLANZAPINE 1.25 MILLIGRAM(S): 20 TABLET ORAL at 00:05

## 2024-10-14 RX ADMIN — OLANZAPINE 2.5 MILLIGRAM(S): 20 TABLET ORAL at 17:50

## 2024-10-14 RX ADMIN — Medication 2 TABLET(S): at 21:46

## 2024-10-14 RX ADMIN — QUETIAPINE FUMARATE 75 MILLIGRAM(S): 200 TABLET ORAL at 12:06

## 2024-10-14 NOTE — CHART NOTE - NSCHARTNOTESELECT_GEN_ALL_CORE
CLIFFORD/Event Note
EEG preliminary/Event Note
Event Note
PSYCHIATRY NOTE/Event Note
Event Note

## 2024-10-14 NOTE — PROVIDER CONTACT NOTE (OTHER) - SITUATION
Patient refuses her medications even after education Patient refuses her vitalsigns and medications even after education

## 2024-10-14 NOTE — CHART NOTE - NSCHARTNOTEFT_GEN_A_CORE
Pt s/p CLIFFORD for severe agitation/hitting staff. Given additional stat 2.5 mg IM Zyprexa and initiated 4 point restraints. Will increase prn zyprexa from 1.25 q6h to 2.5 q6h and continue to monitor pt closely. Safety maintained.

## 2024-10-14 NOTE — PROVIDER CONTACT NOTE (OTHER) - RECOMMENDATIONS
Try back medication administration when patient is calm Try back medication administration when patient is calm along with vitals

## 2024-10-14 NOTE — CHART NOTE - NSCHARTNOTEFT_GEN_A_CORE
Pt s/p carlyn for aggressive behavior/throwing/hitting objects. 4 pt restraints resumed. Discussed with Dr. Johnston, ok to go up to 5 mg IM Zyprexa as needed. Safety maintained with security at bedside. Dr. Irizarry made aware. Will continue to monitor pt closely.

## 2024-10-14 NOTE — PROGRESS NOTE ADULT - PROBLEM SELECTOR PLAN 2
- admission at [facility] since May for it  - was on fluphanzine prn for agitation there, will monitor for now and tx symptomatically for agitation based on team assessment  - mgmt with meds as above  - c/w melatonin qhs  -to return to [facility], awaiting placement

## 2024-10-14 NOTE — PROGRESS NOTE ADULT - PROBLEM SELECTOR PLAN 1
- appears at baseline  - events leading to transfer from [facility] noted and stated as above, now improving back to aaox3 and following commands; sp stroke code with cta, cth, and ct perfusion neg for acute pathology  - neuro consulted; recs appreciated; EEG without seizure activity  - toxic metabolic encephalopathy ; infectious w/u thus far negative; echo unremarkable  - continue valproic acid sprinkles 750mg BID  - continue Seroquel 75 mg bid  - Next Prolixin Dec 12.5 mg IM q2w due 10/23/24  - pt declined MRI, neurology does not need this to be completed and has low suspicion for CVA  - awaiting bed at [facility]    #dilated pupil  -baseline anisocoria (OD 8mm, OS 1mm) per chart review

## 2024-10-14 NOTE — PROGRESS NOTE ADULT - SUBJECTIVE AND OBJECTIVE BOX
Keven Irizarry MD  [facility] Division of Hospital Medicine  Pager: [phone]  Available via MS Teams    SUBJECTIVE / OVERNIGHT EVENTS:    agitated and trying to hit staff members this morning   requiring 4 point restraints  still upset, flight of ideas     MEDICATIONS  (STANDING):  chlorhexidine 2% Cloths 1 Application(s) Topical <User Schedule>  cholecalciferol 1000 Unit(s) Oral daily  dextrose 5%. 1000 milliLiter(s) (50 mL/Hr) IV Continuous <Continuous>  dextrose 5%. 1000 milliLiter(s) (100 mL/Hr) IV Continuous <Continuous>  dextrose 50% Injectable 25 Gram(s) IV Push once  dextrose 50% Injectable 12.5 Gram(s) IV Push once  dextrose 50% Injectable 25 Gram(s) IV Push once  divalproex Sprinkle 1000 milliGRAM(s) Oral two times a day  enoxaparin Injectable 40 milliGRAM(s) SubCutaneous every 24 hours  fluticasone propionate 50 MICROgram(s)/spray Nasal Spray 1 Spray(s) Both Nostrils two times a day  glucagon  Injectable 1 milliGRAM(s) IntraMuscular once  insulin lispro (ADMELOG) corrective regimen sliding scale   SubCutaneous at bedtime  insulin lispro (ADMELOG) corrective regimen sliding scale   SubCutaneous three times a day before meals  levothyroxine 75 MICROGram(s) Oral daily  levothyroxine      melatonin 6 milliGRAM(s) Oral at bedtime  metoprolol succinate ER 50 milliGRAM(s) Oral daily  multivitamin 1 Tablet(s) Oral daily  OLANZapine Injectable 2.5 milliGRAM(s) IntraMuscular once  QUEtiapine 75 milliGRAM(s) Oral at bedtime  QUEtiapine 75 milliGRAM(s) Oral daily  senna 2 Tablet(s) Oral at bedtime    MEDICATIONS  (PRN):  acetaminophen     Tablet .. 650 milliGRAM(s) Oral every 6 hours PRN Temp greater or equal to 38C (100.4F), Mild Pain (1 - 3)  albuterol    90 MICROgram(s) HFA Inhaler 2 Puff(s) Inhalation every 6 hours PRN Shortness of Breath and/or Wheezing  aluminum hydroxide/magnesium hydroxide/simethicone Suspension 30 milliLiter(s) Oral every 4 hours PRN Dyspepsia  bisacodyl 5 milliGRAM(s) Oral every 12 hours PRN Constipation  dextrose Oral Gel 15 Gram(s) Oral once PRN Blood Glucose LESS THAN 70 milliGRAM(s)/deciliter  OLANZapine Injectable 2.5 milliGRAM(s) IntraMuscular every 6 hours PRN agitation  simethicone 80 milliGRAM(s) Chew three times a day PRN Gas      I&O's Summary    13 Oct 2024 07:01  -  14 Oct 2024 07:00  --------------------------------------------------------  IN: 840 mL / OUT: 0 mL / NET: 840 mL        PHYSICAL EXAM:  Vital Signs Last 24 Hrs  T(C): 36.7 (13 Oct 2024 20:50), Max: 36.7 (13 Oct 2024 20:50)  T(F): 98 (13 Oct 2024 20:50), Max: 98 (13 Oct 2024 20:50)  HR: 81 (13 Oct 2024 20:50) (81 - 81)  BP: 110/70 (13 Oct 2024 20:50) (110/70 - 110/70)  BP(mean): --  RR: 18 (13 Oct 2024 20:50) (18 - 18)  SpO2: 98% (13 Oct 2024 20:50) (98% - 98%)    Parameters below as of 13 Oct 2024 20:50  Patient On (Oxygen Delivery Method): room air      CONSTITUTIONAL: NAD, slightly agitated as she is in 4 pt restraints    EYES: conjunctiva and sclera clear  ENMT: Moist oral mucosa   NECK: Supple   RESPIRATORY: Normal respiratory effort; lungs are clear to auscultation bilaterally  CARDIOVASCULAR: Regular rate and rhythm, normal S1 and S2, no murmur/rub/gallop; Peripheral pulses are 2+ bilaterally  ABDOMEN: Nontender to palpation, normoactive bowel sounds, no rebound/guarding   MUSCULOSKELETAL: No lower extremity edema   PSYCH:  pressured affect   NEUROLOGY: moves all extremities   SKIN: No rashes    LABS:                    COVID-19 PCR: NotDetec (10 Oct 2024 09:30)  COVID-19 PCR: NotDetec (07 Oct 2024 12:01)  COVID-19 PCR: NotDetec (04 Oct 2024 04:45)    COORDINATION OF CARE:  Communication: discussed plan of care with ACP

## 2024-10-14 NOTE — CHART NOTE - NSCHARTNOTEFT_GEN_A_CORE
Code CLIFFORD called ahead for agitation, aggressive toward staff. Upon writer's arrival, patient in bed calm.  Staff verbal deescalation techniques successful. Will continue to monitor and reassess for further intervention if needed.

## 2024-10-14 NOTE — BH CONSULTATION LIAISON PROGRESS NOTE - NSBHASSESSMENTFT_PSY_ALL_CORE
Ms Jorge Alberto is a 74 yo F w history of SCZ, Parkinson's dz, hypothyroidism, and multiple psych hospitalizations who presents for AMS. Pt transferred from [facility], where she was found to have fallen and was covered in urine. Pt has been hospitalized in [facility] with occasional stays at [facility] for medical concerns since the start of this year for psychosis. Per [facility] notes, pt appears to be awaiting transfer to [facility] hospital. Pt has been delusional at [facility] with recent delusions surrounding pregnancy. Current medications are Depakote 750 mg bid, Seroquel 50 mg bid, and Prolixin Dec 12.5 mg IM q2w (last dose 9/24). Psychiatry was consulted for continued psychiatric care.    9/30: Pt continues to be paranoid but no agitation noted. EEG done; no seizures recorded. Pending ongoing medical work up.  10/1: Pt continues to be paranoid; no prns required. Ongoing medial work up; once done plan to return to [facility].  10/2: No change today. Once optimized, will return to [facility]. 2PC completed.  10/3: No changes. Continue meds as is.  10/4: Agitation yesterday requiring prn and restraints. Okay today but continues to be paranoid. Pending transfer to [facility]. Low VPA level but missed dose yesterday; not accurate level.  10/7: Agitated over weekend, requiring restraints and prns. Increase Seroquel for paranoia/agitation. Once pt has been taking consistent dose of Depakote, repeat level. Pending [facility] transfer.  10/9-- paranoid, agitated easily  10/11: Continued paranoia, irritability, delusions. Pending [facility] transfer. 2PC updated and in the chart.    Recs:  - Continue Depakote 1000mg bid PO. Will continue to encourage compliance. Has been refusing for the last 1 day. Will considering switching to IV.   - Continue Seroquel 75 mg bid po  	- hold if QTC > 500  - Continue Prolixin Dec 12.5 mg IM last dose 10/9  - consider Zyprexa 1.25 mg po/IM q6h prn for agitation if warranted; may give additional Zyprexa 1.25 mg if first dose is ineffective. hold for QTC > 500  - Continue to monitor VPA level   - Obs: started on 1:1 by team, recommend to  continue CO 1:1 for impulsivity/recent agitation  - Dispo: TBD,  return to [facility] once medically optimized; 2PC completed and in the chart 10/11/24

## 2024-10-15 LAB
ALBUMIN SERPL ELPH-MCNC: 4.1 G/DL — SIGNIFICANT CHANGE UP (ref 3.3–5)
ALP SERPL-CCNC: 98 U/L — SIGNIFICANT CHANGE UP (ref 40–120)
ALT FLD-CCNC: 18 U/L — SIGNIFICANT CHANGE UP (ref 4–33)
ANION GAP SERPL CALC-SCNC: 13 MMOL/L — SIGNIFICANT CHANGE UP (ref 7–14)
AST SERPL-CCNC: 22 U/L — SIGNIFICANT CHANGE UP (ref 4–32)
BASOPHILS # BLD AUTO: 0.04 K/UL — SIGNIFICANT CHANGE UP (ref 0–0.2)
BASOPHILS NFR BLD AUTO: 0.9 % — SIGNIFICANT CHANGE UP (ref 0–2)
BILIRUB SERPL-MCNC: <0.2 MG/DL — SIGNIFICANT CHANGE UP (ref 0.2–1.2)
BUN SERPL-MCNC: 15 MG/DL — SIGNIFICANT CHANGE UP (ref 7–23)
CALCIUM SERPL-MCNC: 9.8 MG/DL — SIGNIFICANT CHANGE UP (ref 8.4–10.5)
CHLORIDE SERPL-SCNC: 106 MMOL/L — SIGNIFICANT CHANGE UP (ref 98–107)
CO2 SERPL-SCNC: 25 MMOL/L — SIGNIFICANT CHANGE UP (ref 22–31)
CREAT SERPL-MCNC: 0.66 MG/DL — SIGNIFICANT CHANGE UP (ref 0.5–1.3)
EGFR: 94 ML/MIN/1.73M2 — SIGNIFICANT CHANGE UP
EOSINOPHIL # BLD AUTO: 0.17 K/UL — SIGNIFICANT CHANGE UP (ref 0–0.5)
EOSINOPHIL NFR BLD AUTO: 3.6 % — SIGNIFICANT CHANGE UP (ref 0–6)
GLUCOSE BLDC GLUCOMTR-MCNC: 118 MG/DL — HIGH (ref 70–99)
GLUCOSE BLDC GLUCOMTR-MCNC: 214 MG/DL — HIGH (ref 70–99)
GLUCOSE BLDC GLUCOMTR-MCNC: 229 MG/DL — HIGH (ref 70–99)
GLUCOSE SERPL-MCNC: 113 MG/DL — HIGH (ref 70–99)
HCT VFR BLD CALC: 41.5 % — SIGNIFICANT CHANGE UP (ref 34.5–45)
HGB BLD-MCNC: 12.7 G/DL — SIGNIFICANT CHANGE UP (ref 11.5–15.5)
IANC: 2.32 K/UL — SIGNIFICANT CHANGE UP (ref 1.8–7.4)
IMM GRANULOCYTES NFR BLD AUTO: 0.4 % — SIGNIFICANT CHANGE UP (ref 0–0.9)
LYMPHOCYTES # BLD AUTO: 1.58 K/UL — SIGNIFICANT CHANGE UP (ref 1–3.3)
LYMPHOCYTES # BLD AUTO: 33.7 % — SIGNIFICANT CHANGE UP (ref 13–44)
MAGNESIUM SERPL-MCNC: 1.9 MG/DL — SIGNIFICANT CHANGE UP (ref 1.6–2.6)
MCHC RBC-ENTMCNC: 26.5 PG — LOW (ref 27–34)
MCHC RBC-ENTMCNC: 30.6 GM/DL — LOW (ref 32–36)
MCV RBC AUTO: 86.6 FL — SIGNIFICANT CHANGE UP (ref 80–100)
MONOCYTES # BLD AUTO: 0.56 K/UL — SIGNIFICANT CHANGE UP (ref 0–0.9)
MONOCYTES NFR BLD AUTO: 11.9 % — SIGNIFICANT CHANGE UP (ref 2–14)
NEUTROPHILS # BLD AUTO: 2.32 K/UL — SIGNIFICANT CHANGE UP (ref 1.8–7.4)
NEUTROPHILS NFR BLD AUTO: 49.5 % — SIGNIFICANT CHANGE UP (ref 43–77)
NRBC # BLD: 0 /100 WBCS — SIGNIFICANT CHANGE UP (ref 0–0)
NRBC # FLD: 0 K/UL — SIGNIFICANT CHANGE UP (ref 0–0)
PHOSPHATE SERPL-MCNC: 3.6 MG/DL — SIGNIFICANT CHANGE UP (ref 2.5–4.5)
PLATELET # BLD AUTO: 193 K/UL — SIGNIFICANT CHANGE UP (ref 150–400)
POTASSIUM SERPL-MCNC: 4.2 MMOL/L — SIGNIFICANT CHANGE UP (ref 3.5–5.3)
POTASSIUM SERPL-SCNC: 4.2 MMOL/L — SIGNIFICANT CHANGE UP (ref 3.5–5.3)
PROT SERPL-MCNC: 8.2 G/DL — SIGNIFICANT CHANGE UP (ref 6–8.3)
RBC # BLD: 4.79 M/UL — SIGNIFICANT CHANGE UP (ref 3.8–5.2)
RBC # FLD: 15.9 % — HIGH (ref 10.3–14.5)
SODIUM SERPL-SCNC: 144 MMOL/L — SIGNIFICANT CHANGE UP (ref 135–145)
WBC # BLD: 4.69 K/UL — SIGNIFICANT CHANGE UP (ref 3.8–10.5)
WBC # FLD AUTO: 4.69 K/UL — SIGNIFICANT CHANGE UP (ref 3.8–10.5)

## 2024-10-15 PROCEDURE — 99233 SBSQ HOSP IP/OBS HIGH 50: CPT

## 2024-10-15 RX ADMIN — Medication 50 MILLIGRAM(S): at 06:38

## 2024-10-15 RX ADMIN — QUETIAPINE FUMARATE 75 MILLIGRAM(S): 200 TABLET ORAL at 12:08

## 2024-10-15 RX ADMIN — Medication 75 MICROGRAM(S): at 06:34

## 2024-10-15 RX ADMIN — QUETIAPINE FUMARATE 75 MILLIGRAM(S): 200 TABLET ORAL at 21:06

## 2024-10-15 RX ADMIN — Medication 2: at 19:04

## 2024-10-15 RX ADMIN — CHLORHEXIDINE GLUCONATE 1 APPLICATION(S): 40 SOLUTION TOPICAL at 06:37

## 2024-10-15 RX ADMIN — Medication 1000 UNIT(S): at 12:09

## 2024-10-15 RX ADMIN — FLUTICASONE PROPIONATE 1 SPRAY(S): 50 SPRAY, METERED NASAL at 06:34

## 2024-10-15 RX ADMIN — Medication 40 MILLIGRAM(S): at 12:09

## 2024-10-15 RX ADMIN — DIVALPROEX SODIUM 1000 MILLIGRAM(S): 250 TABLET, FILM COATED, EXTENDED RELEASE ORAL at 06:41

## 2024-10-15 RX ADMIN — Medication 6 MILLIGRAM(S): at 21:07

## 2024-10-15 RX ADMIN — Medication 2 TABLET(S): at 21:07

## 2024-10-15 RX ADMIN — Medication 1 TABLET(S): at 12:09

## 2024-10-15 RX ADMIN — DIVALPROEX SODIUM 1000 MILLIGRAM(S): 250 TABLET, FILM COATED, EXTENDED RELEASE ORAL at 18:42

## 2024-10-15 NOTE — PROGRESS NOTE ADULT - PROBLEM SELECTOR PLAN 8
- DVT ppx: lovenox  - Diet: minced and moist  - Full code  Plan of care d/w pt and ACP    Discussed case with Sister Natali Bernarda  on 10/15

## 2024-10-15 NOTE — PROGRESS NOTE ADULT - PROBLEM SELECTOR PLAN 2
- admission at [facility] since May for it  - was on fluphanzine prn for agitation there, will monitor for now and tx symptomatically for agitation based on team assessment  - mgmt with meds as above  - c/w melatonin qhs  -to return to [facility], awaiting placement

## 2024-10-15 NOTE — PROGRESS NOTE ADULT - SUBJECTIVE AND OBJECTIVE BOX
Keven Irizarry MD  [facility] Division of Hospital Medicine  Pager: [phone]  Available via MS Teams    SUBJECTIVE / OVERNIGHT EVENTS:    became aggressive and required 4 pt restraints overnight. Trying to hit staff members   freed from restraints this morning and more calm, eating food     MEDICATIONS  (STANDING):  chlorhexidine 2% Cloths 1 Application(s) Topical <User Schedule>  cholecalciferol 1000 Unit(s) Oral daily  dextrose 5%. 1000 milliLiter(s) (100 mL/Hr) IV Continuous <Continuous>  dextrose 5%. 1000 milliLiter(s) (50 mL/Hr) IV Continuous <Continuous>  dextrose 50% Injectable 25 Gram(s) IV Push once  dextrose 50% Injectable 12.5 Gram(s) IV Push once  dextrose 50% Injectable 25 Gram(s) IV Push once  divalproex Sprinkle 1000 milliGRAM(s) Oral two times a day  enoxaparin Injectable 40 milliGRAM(s) SubCutaneous every 24 hours  fluticasone propionate 50 MICROgram(s)/spray Nasal Spray 1 Spray(s) Both Nostrils two times a day  glucagon  Injectable 1 milliGRAM(s) IntraMuscular once  insulin lispro (ADMELOG) corrective regimen sliding scale   SubCutaneous at bedtime  insulin lispro (ADMELOG) corrective regimen sliding scale   SubCutaneous three times a day before meals  levothyroxine 75 MICROGram(s) Oral daily  levothyroxine      melatonin 6 milliGRAM(s) Oral at bedtime  metoprolol succinate ER 50 milliGRAM(s) Oral daily  multivitamin 1 Tablet(s) Oral daily  QUEtiapine 75 milliGRAM(s) Oral daily  QUEtiapine 75 milliGRAM(s) Oral at bedtime  senna 2 Tablet(s) Oral at bedtime    MEDICATIONS  (PRN):  acetaminophen     Tablet .. 650 milliGRAM(s) Oral every 6 hours PRN Temp greater or equal to 38C (100.4F), Mild Pain (1 - 3)  albuterol    90 MICROgram(s) HFA Inhaler 2 Puff(s) Inhalation every 6 hours PRN Shortness of Breath and/or Wheezing  aluminum hydroxide/magnesium hydroxide/simethicone Suspension 30 milliLiter(s) Oral every 4 hours PRN Dyspepsia  bisacodyl 5 milliGRAM(s) Oral every 12 hours PRN Constipation  dextrose Oral Gel 15 Gram(s) Oral once PRN Blood Glucose LESS THAN 70 milliGRAM(s)/deciliter  OLANZapine Injectable 3.75 milliGRAM(s) IntraMuscular every 6 hours PRN agitation  simethicone 80 milliGRAM(s) Chew three times a day PRN Gas      I&O's Summary      PHYSICAL EXAM:  Vital Signs Last 24 Hrs  T(C): 37.2 (15 Oct 2024 10:56), Max: 37.2 (15 Oct 2024 10:56)  T(F): 98.9 (15 Oct 2024 10:56), Max: 98.9 (15 Oct 2024 10:56)  HR: 90 (15 Oct 2024 10:56) (62 - 90)  BP: 165/92 (15 Oct 2024 10:56) (137/82 - 176/76)  BP(mean): --  RR: 18 (15 Oct 2024 10:56) (18 - 19)  SpO2: 98% (15 Oct 2024 10:56) (98% - 99%)    Parameters below as of 15 Oct 2024 10:56  Patient On (Oxygen Delivery Method): room air      CONSTITUTIONAL: NAD   EYES: conjunctiva and sclera clear  ENMT: Moist oral mucosa   NECK: Supple   RESPIRATORY: Normal respiratory effort; lungs are clear to auscultation bilaterally  CARDIOVASCULAR: Regular rate and rhythm, normal S1 and S2, no murmur/rub/gallop; Peripheral pulses are 2+ bilaterally  ABDOMEN: Nontender to palpation, normoactive bowel sounds, no rebound/guarding   MUSCULOSKELETAL: No lower extremity edema   PSYCH: affect appropriate  NEUROLOGY: moves all extremities   SKIN: No rashes    LABS:                        12.7   4.69  )-----------( 193      ( 15 Oct 2024 04:40 )             41.5     10-15    144  |  106  |  15  ----------------------------<  113[H]  4.2   |  25  |  0.66    Ca    9.8      15 Oct 2024 04:40  Phos  3.6     10-15  Mg     1.90     10-15    TPro  8.2  /  Alb  4.1  /  TBili  <0.2  /  DBili  x   /  AST  22  /  ALT  18  /  AlkPhos  98  10-15          Urinalysis Basic - ( 15 Oct 2024 04:40 )    Color: x / Appearance: x / SG: x / pH: x  Gluc: 113 mg/dL / Ketone: x  / Bili: x / Urobili: x   Blood: x / Protein: x / Nitrite: x   Leuk Esterase: x / RBC: x / WBC x   Sq Epi: x / Non Sq Epi: x / Bacteria: x        COVID-19 PCR: NotDetec (10 Oct 2024 09:30)  COVID-19 PCR: NotDetec (07 Oct 2024 12:01)  COVID-19 PCR: NotDetec (04 Oct 2024 04:45)      RADIOLOGY & ADDITIONAL TESTS:  Other Results Reviewed Today: BMP with stable Cr, CBC with stable Hg     COORDINATION OF CARE:  Communication: discussed plan of care with ACP

## 2024-10-15 NOTE — PROGRESS NOTE ADULT - PROBLEM SELECTOR PLAN 1
- appears at baseline  - events leading to transfer from [facility] noted and stated as above, now improving back to aaox3 and following commands; sp stroke code with cta, cth, and ct perfusion neg for acute pathology  - neuro consulted; recs appreciated; EEG without seizure activity  - toxic metabolic encephalopathy ; infectious w/u thus far negative; echo unremarkable  - continue valproic acid sprinkles 1000 mg BID  - Continue Seroquel 75 mg bid   - Next Prolixin Dec 12.5 mg IM q2w due 10/23/24  - pt declined MRI, neurology does not need this to be completed and has low suspicion for CVA  - awaiting bed at [facility]    #dilated pupil  -baseline anisocoria (OD 8mm, OS 1mm) per chart review

## 2024-10-16 LAB
GLUCOSE BLDC GLUCOMTR-MCNC: 218 MG/DL — HIGH (ref 70–99)
MRSA PCR RESULT.: DETECTED
S AUREUS DNA NOSE QL NAA+PROBE: DETECTED

## 2024-10-16 PROCEDURE — 99232 SBSQ HOSP IP/OBS MODERATE 35: CPT

## 2024-10-16 RX ORDER — MUPIROCIN 20 MG/G
1 OINTMENT TOPICAL
Refills: 0 | Status: COMPLETED | OUTPATIENT
Start: 2024-10-16 | End: 2024-10-20

## 2024-10-16 RX ADMIN — CHLORHEXIDINE GLUCONATE 1 APPLICATION(S): 40 SOLUTION TOPICAL at 05:56

## 2024-10-16 RX ADMIN — DIVALPROEX SODIUM 1000 MILLIGRAM(S): 250 TABLET, FILM COATED, EXTENDED RELEASE ORAL at 17:10

## 2024-10-16 RX ADMIN — Medication 2 TABLET(S): at 21:26

## 2024-10-16 RX ADMIN — MUPIROCIN 1 APPLICATION(S): 20 OINTMENT TOPICAL at 17:09

## 2024-10-16 RX ADMIN — Medication 75 MICROGRAM(S): at 06:30

## 2024-10-16 RX ADMIN — Medication 6 MILLIGRAM(S): at 21:25

## 2024-10-16 RX ADMIN — DIVALPROEX SODIUM 1000 MILLIGRAM(S): 250 TABLET, FILM COATED, EXTENDED RELEASE ORAL at 10:14

## 2024-10-16 RX ADMIN — QUETIAPINE FUMARATE 75 MILLIGRAM(S): 200 TABLET ORAL at 21:24

## 2024-10-16 RX ADMIN — OLANZAPINE 3.75 MILLIGRAM(S): 20 TABLET ORAL at 00:20

## 2024-10-16 RX ADMIN — Medication 1000 UNIT(S): at 11:27

## 2024-10-16 RX ADMIN — MUPIROCIN 1 APPLICATION(S): 20 OINTMENT TOPICAL at 05:56

## 2024-10-16 RX ADMIN — QUETIAPINE FUMARATE 75 MILLIGRAM(S): 200 TABLET ORAL at 11:27

## 2024-10-16 RX ADMIN — Medication 2: at 18:20

## 2024-10-16 RX ADMIN — Medication 40 MILLIGRAM(S): at 10:19

## 2024-10-16 RX ADMIN — Medication 1 TABLET(S): at 11:27

## 2024-10-16 NOTE — PROGRESS NOTE ADULT - PROBLEM SELECTOR PLAN 1
- now appears at baseline  - s/p stroke code with cta, cth, and ct perfusion neg for acute pathology  - neuro consulted; recs appreciated; EEG without seizure activity  - pt declined MRI, per neurology does not need this to be completed and has low suspicion for CVA  - toxic metabolic encephalopathy ; infectious w/u negative; echo unremarkable  - continue valproic acid sprinkles 1000 mg BID  - Continue Seroquel 75 mg bid   - Next Prolixin Dec 12.5 mg IM q2w due 10/23/24  - awaiting bed at [facility]    #dilated pupil  -baseline anisocoria (OD 8mm, OS 1mm) per chart review

## 2024-10-16 NOTE — PROGRESS NOTE ADULT - SUBJECTIVE AND OBJECTIVE BOX
Keven Irizarry MD  [facility] of [facility] Medicine  Pager: [phone]  Available via MS Teams    SUBJECTIVE / OVERNIGHT EVENTS:    wandering the halls   calm   redirectable   has some sputum production per pt, but no cough or dyspnea     MEDICATIONS  (STANDING):  chlorhexidine 2% Cloths 1 Application(s) Topical <User Schedule>  cholecalciferol 1000 Unit(s) Oral daily  dextrose 5%. 1000 milliLiter(s) (50 mL/Hr) IV Continuous <Continuous>  dextrose 5%. 1000 milliLiter(s) (100 mL/Hr) IV Continuous <Continuous>  dextrose 50% Injectable 25 Gram(s) IV Push once  dextrose 50% Injectable 12.5 Gram(s) IV Push once  dextrose 50% Injectable 25 Gram(s) IV Push once  divalproex Sprinkle 1000 milliGRAM(s) Oral two times a day  enoxaparin Injectable 40 milliGRAM(s) SubCutaneous every 24 hours  fluticasone propionate 50 MICROgram(s)/spray Nasal Spray 1 Spray(s) Both Nostrils two times a day  glucagon  Injectable 1 milliGRAM(s) IntraMuscular once  insulin lispro (ADMELOG) corrective regimen sliding scale   SubCutaneous at bedtime  insulin lispro (ADMELOG) corrective regimen sliding scale   SubCutaneous three times a day before meals  levothyroxine 75 MICROGram(s) Oral daily  levothyroxine      melatonin 6 milliGRAM(s) Oral at bedtime  metoprolol succinate ER 50 milliGRAM(s) Oral daily  multivitamin 1 Tablet(s) Oral daily  mupirocin 2% Nasal 1 Application(s) Both Nostrils two times a day  QUEtiapine 75 milliGRAM(s) Oral daily  QUEtiapine 75 milliGRAM(s) Oral at bedtime  senna 2 Tablet(s) Oral at bedtime    MEDICATIONS  (PRN):  acetaminophen     Tablet .. 650 milliGRAM(s) Oral every 6 hours PRN Temp greater or equal to 38C (100.4F), Mild Pain (1 - 3)  albuterol    90 MICROgram(s) HFA Inhaler 2 Puff(s) Inhalation every 6 hours PRN Shortness of Breath and/or Wheezing  aluminum hydroxide/magnesium hydroxide/simethicone Suspension 30 milliLiter(s) Oral every 4 hours PRN Dyspepsia  bisacodyl 5 milliGRAM(s) Oral every 12 hours PRN Constipation  dextrose Oral Gel 15 Gram(s) Oral once PRN Blood Glucose LESS THAN 70 milliGRAM(s)/deciliter  OLANZapine Injectable 3.75 milliGRAM(s) IntraMuscular every 6 hours PRN agitation  simethicone 80 milliGRAM(s) Chew three times a day PRN Gas      I&O's Summary    15 Oct 2024 07:01  -  16 Oct 2024 07:00  --------------------------------------------------------  IN: 120 mL / OUT: 0 mL / NET: 120 mL        PHYSICAL EXAM:  Vital Signs Last 24 Hrs  T(C): 36.9 (16 Oct 2024 06:31), Max: 36.9 (16 Oct 2024 06:31)  T(F): 98.4 (16 Oct 2024 06:31), Max: 98.4 (16 Oct 2024 06:31)  HR: 90 (16 Oct 2024 06:31) (90 - 95)  BP: 137/70 (16 Oct 2024 06:31) (137/70 - 155/70)  BP(mean): --  RR: 18 (16 Oct 2024 06:31) (18 - 19)  SpO2: 100% (16 Oct 2024 06:31) (98% - 100%)    Parameters below as of 16 Oct 2024 06:31  Patient On (Oxygen Delivery Method): room air      CONSTITUTIONAL: NAD   EYES: conjunctiva and sclera clear  ENMT: Moist oral mucosa   NECK: Supple   RESPIRATORY: Normal respiratory effort; lungs are clear to auscultation bilaterally  CARDIOVASCULAR: Regular rate and rhythm, normal S1 and S2, no murmur/rub/gallop; Peripheral pulses are 2+ bilaterally  ABDOMEN: Nontender to palpation, normoactive bowel sounds, no rebound/guarding   MUSCULOSKELETAL: No lower extremity edema   PSYCH: calm, redirectable, tangential   NEUROLOGY: moves all extremities   SKIN: No rashes    LABS:                        12.7   4.69  )-----------( 193      ( 15 Oct 2024 04:40 )             41.5     10-15    144  |  106  |  15  ----------------------------<  113[H]  4.2   |  25  |  0.66    Ca    9.8      15 Oct 2024 04:40  Phos  3.6     10-15  Mg     1.90     10-15    TPro  8.2  /  Alb  4.1  /  TBili  <0.2  /  DBili  x   /  AST  22  /  ALT  18  /  AlkPhos  98  10-15          Urinalysis Basic - ( 15 Oct 2024 04:40 )    Color: x / Appearance: x / SG: x / pH: x  Gluc: 113 mg/dL / Ketone: x  / Bili: x / Urobili: x   Blood: x / Protein: x / Nitrite: x   Leuk Esterase: x / RBC: x / WBC x   Sq Epi: x / Non Sq Epi: x / Bacteria: x        COVID-19 PCR: NotDetec (10 Oct 2024 09:30)  COVID-19 PCR: NotDetec (07 Oct 2024 12:01)  COVID-19 PCR: NotDetec (04 Oct 2024 04:45)      RADIOLOGY & ADDITIONAL TESTS:  Other Results Reviewed Today: BMP with stable Cr, CBC with stable Hg     COORDINATION OF CARE:  Communication: discussed plan of care with ACP

## 2024-10-16 NOTE — PROGRESS NOTE ADULT - PROBLEM SELECTOR PLAN 2
- admission at [facility] since May  - was on fluphanzine prn for agitation there, will monitor for now and tx symptomatically for agitation based on team assessment  - mgmt with meds as above  - c/w melatonin qhs  - to return to [facility], awaiting placement

## 2024-10-17 LAB — GLUCOSE BLDC GLUCOMTR-MCNC: 154 MG/DL — HIGH (ref 70–99)

## 2024-10-17 PROCEDURE — 99232 SBSQ HOSP IP/OBS MODERATE 35: CPT

## 2024-10-17 PROCEDURE — 99231 SBSQ HOSP IP/OBS SF/LOW 25: CPT

## 2024-10-17 RX ADMIN — QUETIAPINE FUMARATE 75 MILLIGRAM(S): 200 TABLET ORAL at 21:31

## 2024-10-17 RX ADMIN — Medication 50 MILLIGRAM(S): at 06:08

## 2024-10-17 RX ADMIN — Medication 1 TABLET(S): at 17:45

## 2024-10-17 RX ADMIN — Medication 1: at 17:42

## 2024-10-17 RX ADMIN — Medication 75 MICROGRAM(S): at 06:08

## 2024-10-17 RX ADMIN — MUPIROCIN 1 APPLICATION(S): 20 OINTMENT TOPICAL at 06:17

## 2024-10-17 RX ADMIN — DIVALPROEX SODIUM 1000 MILLIGRAM(S): 250 TABLET, FILM COATED, EXTENDED RELEASE ORAL at 06:09

## 2024-10-17 RX ADMIN — Medication 2 TABLET(S): at 21:31

## 2024-10-17 RX ADMIN — Medication 1000 UNIT(S): at 17:45

## 2024-10-17 RX ADMIN — DIVALPROEX SODIUM 1000 MILLIGRAM(S): 250 TABLET, FILM COATED, EXTENDED RELEASE ORAL at 17:44

## 2024-10-17 RX ADMIN — Medication 6 MILLIGRAM(S): at 21:29

## 2024-10-17 RX ADMIN — QUETIAPINE FUMARATE 75 MILLIGRAM(S): 200 TABLET ORAL at 17:45

## 2024-10-17 RX ADMIN — CHLORHEXIDINE GLUCONATE 1 APPLICATION(S): 40 SOLUTION TOPICAL at 06:17

## 2024-10-17 NOTE — BH CONSULTATION LIAISON PROGRESS NOTE - NSBHASSESSMENTFT_PSY_ALL_CORE
Ms Jorge Alberto is a 72 yo F w history of SCZ, Parkinson's dz, hypothyroidism, and multiple psych hospitalizations who presents for AMS. Pt transferred from [facility], where she was found to have fallen and was covered in urine. Pt has been hospitalized in [facility] with occasional stays at [facility] for medical concerns since the start of this year for psychosis. Per [facility] notes, pt appears to be awaiting transfer to [facility] hospital. Pt has been delusional at [facility] with recent delusions surrounding pregnancy. Current medications are Depakote 750 mg bid, Seroquel 50 mg bid, and Prolixin Dec 12.5 mg IM q2w (last dose 9/24). Psychiatry was consulted for continued psychiatric care.    9/30: Pt continues to be paranoid but no agitation noted. EEG done; no seizures recorded. Pending ongoing medical work up.  10/1: Pt continues to be paranoid; no prns required. Ongoing medial work up; once done plan to return to [facility].  10/2: No change today. Once optimized, will return to [facility]. 2PC completed.  10/3: No changes. Continue meds as is.  10/4: Agitation yesterday requiring prn and restraints. Okay today but continues to be paranoid. Pending transfer to [facility]. Low VPA level but missed dose yesterday; not accurate level.  10/7: Agitated over weekend, requiring restraints and prns. Increase Seroquel for paranoia/agitation. Once pt has been taking consistent dose of Depakote, repeat level. Pending [facility] transfer.  10/9-- paranoid, agitated easily  10/11: Continued paranoia, irritability, delusions. Pending [facility] transfer. 2PC updated and in the chart.    10/17---disorganized, hostile, with delusions, AH    Recs:  - Continue Depakote 1000mg bid PO. Will continue to encourage compliance.   - Continue Seroquel 75 mg bid po  	- hold if QTC > 500  - Continue Prolixin Dec 12.5 mg IM last dose 10/9  - consider Zyprexa 1.25 mg po/IM q6h prn for agitation if warranted; may give additional Zyprexa 1.25 mg if first dose is ineffective. hold for QTC > 500  - Continue to monitor VPA level   - Obs: started on 1:1 by team, recommend to  continue CO 1:1 for impulsivity/recent agitation  - Dispo: TBD,  return to [facility] once medically optimized; 2PC completed and in the chart 10/11/24

## 2024-10-17 NOTE — PROGRESS NOTE ADULT - PROBLEM SELECTOR PLAN 2
- admission at [facility] since May  - was on fluphanzine prn for agitation there, will monitor for now and tx symptomatically for agitation based on team assessment  - mgmt with meds as above  - c/w melatonin qhs  - to return to [facility], awaiting placement

## 2024-10-17 NOTE — BH CONSULTATION LIAISON PROGRESS NOTE - NSBHFUPINTERVALHXFT_PSY_A_CORE
Disorganized, paranoid, curses MD when tried to engage. Refuses to engage at all. Can be seen talking to self loudly

## 2024-10-17 NOTE — PROGRESS NOTE ADULT - PROBLEM SELECTOR PLAN 1
- now appears at baseline  - s/p stroke code with cta, cth, and ct perfusion neg for acute pathology  - neuro consulted; recs appreciated; EEG without seizure activity  - pt declined MRI, per neurology does not need this to be completed and has low suspicion for CVA  - toxic metabolic encephalopathy ; infectious w/u negative; echo unremarkable  - continue valproic acid sprinkles 1000 mg BID  - Continue Seroquel 75 mg bid   - Next Prolixin Dec 12.5 mg IM q2w due 10/23/24  - awaiting bed at [facility]    #dilated pupil  -baseline anisocoria (OD 8mm, OS 1mm) per chart review

## 2024-10-17 NOTE — PROVIDER CONTACT NOTE (OTHER) - ACTION/TREATMENT ORDERED:
Per ACP ok to not perform the bedtime FS since it is causing the patient to become agitated and aggressive.

## 2024-10-17 NOTE — PROVIDER CONTACT NOTE (OTHER) - ASSESSMENT
Patient is refusing her bedtime FS.  Patient becomes agitated and aggressive when FS is attempted to be performed.

## 2024-10-17 NOTE — PROGRESS NOTE ADULT - SUBJECTIVE AND OBJECTIVE BOX
Keven Irizarry MD  [facility] Division of Hospital Medicine  Pager: [phone]  Available via MS Teams    SUBJECTIVE / OVERNIGHT EVENTS:    agitated and upset today   not very forthcoming and would like to be left alone     MEDICATIONS  (STANDING):  chlorhexidine 2% Cloths 1 Application(s) Topical <User Schedule>  cholecalciferol 1000 Unit(s) Oral daily  dextrose 5%. 1000 milliLiter(s) (50 mL/Hr) IV Continuous <Continuous>  dextrose 5%. 1000 milliLiter(s) (100 mL/Hr) IV Continuous <Continuous>  dextrose 50% Injectable 25 Gram(s) IV Push once  dextrose 50% Injectable 12.5 Gram(s) IV Push once  dextrose 50% Injectable 25 Gram(s) IV Push once  divalproex Sprinkle 1000 milliGRAM(s) Oral two times a day  enoxaparin Injectable 40 milliGRAM(s) SubCutaneous every 24 hours  fluticasone propionate 50 MICROgram(s)/spray Nasal Spray 1 Spray(s) Both Nostrils two times a day  glucagon  Injectable 1 milliGRAM(s) IntraMuscular once  insulin lispro (ADMELOG) corrective regimen sliding scale   SubCutaneous at bedtime  insulin lispro (ADMELOG) corrective regimen sliding scale   SubCutaneous three times a day before meals  levothyroxine 75 MICROGram(s) Oral daily  levothyroxine      melatonin 6 milliGRAM(s) Oral at bedtime  metoprolol succinate ER 50 milliGRAM(s) Oral daily  multivitamin 1 Tablet(s) Oral daily  mupirocin 2% Nasal 1 Application(s) Both Nostrils two times a day  QUEtiapine 75 milliGRAM(s) Oral at bedtime  QUEtiapine 75 milliGRAM(s) Oral daily  senna 2 Tablet(s) Oral at bedtime    MEDICATIONS  (PRN):  acetaminophen     Tablet .. 650 milliGRAM(s) Oral every 6 hours PRN Temp greater or equal to 38C (100.4F), Mild Pain (1 - 3)  albuterol    90 MICROgram(s) HFA Inhaler 2 Puff(s) Inhalation every 6 hours PRN Shortness of Breath and/or Wheezing  aluminum hydroxide/magnesium hydroxide/simethicone Suspension 30 milliLiter(s) Oral every 4 hours PRN Dyspepsia  bisacodyl 5 milliGRAM(s) Oral every 12 hours PRN Constipation  dextrose Oral Gel 15 Gram(s) Oral once PRN Blood Glucose LESS THAN 70 milliGRAM(s)/deciliter  OLANZapine Injectable 3.75 milliGRAM(s) IntraMuscular every 6 hours PRN agitation  simethicone 80 milliGRAM(s) Chew three times a day PRN Gas      I&O's Summary      PHYSICAL EXAM:  Vital Signs Last 24 Hrs  T(C): 36.3 (17 Oct 2024 06:05), Max: 36.3 (16 Oct 2024 21:20)  T(F): 97.4 (17 Oct 2024 06:05), Max: 97.4 (16 Oct 2024 21:20)  HR: 86 (17 Oct 2024 06:05) (86 - 87)  BP: 137/84 (17 Oct 2024 06:05) (137/84 - 152/90)  BP(mean): --  RR: 18 (17 Oct 2024 06:05) (18 - 18)  SpO2: 100% (17 Oct 2024 06:05) (100% - 100%)    Parameters below as of 17 Oct 2024 06:05  Patient On (Oxygen Delivery Method): room air      CONSTITUTIONAL: NAD   EYES: conjunctiva and sclera clear  ENMT: Moist oral mucosa   NECK: Supple   RESPIRATORY: Normal respiratory effort; lungs are clear to auscultation bilaterally  CARDIOVASCULAR: would not allow further assessment with auscultation   ABDOMEN: Nontender to palpation, no rebound/guarding   MUSCULOSKELETAL: No lower extremity edema   PSYCH: knows she is in the hospital, does not answer further questions   NEUROLOGY: moves all extremities   SKIN: No rashes    LABS:                    COVID-19 PCR: NotDetec (10 Oct 2024 09:30)  COVID-19 PCR: NotDetec (07 Oct 2024 12:01)  COVID-19 PCR: NotDetec (04 Oct 2024 04:45)      COORDINATION OF CARE:  Communication: discussed plan of care with ACP

## 2024-10-18 LAB
GLUCOSE BLDC GLUCOMTR-MCNC: 102 MG/DL — HIGH (ref 70–99)
GLUCOSE BLDC GLUCOMTR-MCNC: 201 MG/DL — HIGH (ref 70–99)

## 2024-10-18 PROCEDURE — 99232 SBSQ HOSP IP/OBS MODERATE 35: CPT

## 2024-10-18 RX ADMIN — Medication 1 TABLET(S): at 11:33

## 2024-10-18 RX ADMIN — DIVALPROEX SODIUM 1000 MILLIGRAM(S): 250 TABLET, FILM COATED, EXTENDED RELEASE ORAL at 06:30

## 2024-10-18 RX ADMIN — Medication 50 MILLIGRAM(S): at 06:28

## 2024-10-18 RX ADMIN — QUETIAPINE FUMARATE 75 MILLIGRAM(S): 200 TABLET ORAL at 11:32

## 2024-10-18 RX ADMIN — CHLORHEXIDINE GLUCONATE 1 APPLICATION(S): 40 SOLUTION TOPICAL at 06:31

## 2024-10-18 RX ADMIN — Medication 1000 UNIT(S): at 11:45

## 2024-10-18 RX ADMIN — Medication 75 MICROGRAM(S): at 06:27

## 2024-10-18 NOTE — PROGRESS NOTE ADULT - PROBLEM SELECTOR PLAN 2
- admission at [facility] since May  - was on fluphanzine prn for agitation there, will monitor for now and tx symptomatically for agitation based on team assessment  - mgmt with meds as above  - c/w melatonin qhs  - to return to [facility], awaiting placement

## 2024-10-18 NOTE — PROGRESS NOTE ADULT - PROBLEM SELECTOR PLAN 1
- now appears at baseline  - s/p stroke code with cta, cth, and ct perfusion neg for acute pathology  - neuro consulted; recs appreciated; EEG without seizure activity  - pt declined MRI, per neurology does not need this to be completed and has low suspicion for CVA  - toxic metabolic encephalopathy ; infectious w/u negative; echo unremarkable  - continue valproic acid sprinkles 1000 mg BID  - Continue Seroquel 75 mg bid   - Next Prolixin Dec 12.5 mg IM q2w due 10/23/24  - awaiting bed at [facility]    #dilated pupil  -baseline anisocoria (OD 8mm, OS 1mm) per chart review

## 2024-10-18 NOTE — BH CONSULTATION LIAISON PROGRESS NOTE - NSBHFUPINTERVALHXFT_PSY_A_CORE
Pt seen in room. No prn medications required. Rinsing toothbrush with hand . Initially upset that I said I was coming in to see how she was doing but later said she appreciated that someone was coming just to check in on her. Thought that I wanted to take her blood. Eating okay, sleep stable. No other complaints.

## 2024-10-18 NOTE — BH CONSULTATION LIAISON PROGRESS NOTE - ATTENDING COMMENTS
Handoff received from Dr. Johnston, chart reviewed, case d/w Dr. Mariee, I agree with above assessment/plan. Will follow 
Handoff received from Dr. Johnston, chart reviewed, case d/w Dr. Mariee, I agree with above assessment/plan. Will follow
discussed case with fellow md impression and plan discussed and agreed upon
Discussed case with fellow md, impression and plan discussed and agreed upon

## 2024-10-18 NOTE — PROGRESS NOTE ADULT - SUBJECTIVE AND OBJECTIVE BOX
Keven Irizarry MD  [facility] Division of Hospital Medicine  Pager: [phone]  Available via MS Teams    SUBJECTIVE / OVERNIGHT EVENTS:    again agitated today   numerous complaints   asking for new glasses. When asked if family can help bring her glasses, she states "we are not getting into it" and would not elaborate further      MEDICATIONS  (STANDING):  chlorhexidine 2% Cloths 1 Application(s) Topical <User Schedule>  cholecalciferol 1000 Unit(s) Oral daily  dextrose 5%. 1000 milliLiter(s) (50 mL/Hr) IV Continuous <Continuous>  dextrose 5%. 1000 milliLiter(s) (100 mL/Hr) IV Continuous <Continuous>  dextrose 50% Injectable 25 Gram(s) IV Push once  dextrose 50% Injectable 12.5 Gram(s) IV Push once  dextrose 50% Injectable 25 Gram(s) IV Push once  divalproex Sprinkle 1000 milliGRAM(s) Oral two times a day  enoxaparin Injectable 40 milliGRAM(s) SubCutaneous every 24 hours  fluticasone propionate 50 MICROgram(s)/spray Nasal Spray 1 Spray(s) Both Nostrils two times a day  glucagon  Injectable 1 milliGRAM(s) IntraMuscular once  insulin lispro (ADMELOG) corrective regimen sliding scale   SubCutaneous at bedtime  insulin lispro (ADMELOG) corrective regimen sliding scale   SubCutaneous three times a day before meals  levothyroxine 75 MICROGram(s) Oral daily  levothyroxine      melatonin 6 milliGRAM(s) Oral at bedtime  metoprolol succinate ER 50 milliGRAM(s) Oral daily  multivitamin 1 Tablet(s) Oral daily  mupirocin 2% Nasal 1 Application(s) Both Nostrils two times a day  QUEtiapine 75 milliGRAM(s) Oral daily  QUEtiapine 75 milliGRAM(s) Oral at bedtime  senna 2 Tablet(s) Oral at bedtime    MEDICATIONS  (PRN):  acetaminophen     Tablet .. 650 milliGRAM(s) Oral every 6 hours PRN Temp greater or equal to 38C (100.4F), Mild Pain (1 - 3)  albuterol    90 MICROgram(s) HFA Inhaler 2 Puff(s) Inhalation every 6 hours PRN Shortness of Breath and/or Wheezing  aluminum hydroxide/magnesium hydroxide/simethicone Suspension 30 milliLiter(s) Oral every 4 hours PRN Dyspepsia  bisacodyl 5 milliGRAM(s) Oral every 12 hours PRN Constipation  dextrose Oral Gel 15 Gram(s) Oral once PRN Blood Glucose LESS THAN 70 milliGRAM(s)/deciliter  OLANZapine Injectable 3.75 milliGRAM(s) IntraMuscular every 6 hours PRN agitation  simethicone 80 milliGRAM(s) Chew three times a day PRN Gas      I&O's Summary      PHYSICAL EXAM:  Vital Signs Last 24 Hrs  T(C): 36.3 (18 Oct 2024 12:28), Max: 36.4 (17 Oct 2024 21:28)  T(F): 97.4 (18 Oct 2024 12:28), Max: 97.6 (17 Oct 2024 21:28)  HR: 76 (18 Oct 2024 12:28) (72 - 87)  BP: 146/85 (18 Oct 2024 12:28) (142/89 - 151/82)  BP(mean): --  RR: 18 (18 Oct 2024 12:28) (18 - 18)  SpO2: 100% (18 Oct 2024 12:28) (100% - 100%)    Parameters below as of 18 Oct 2024 12:28  Patient On (Oxygen Delivery Method): room air    CONSTITUTIONAL: NAD   EYES: conjunctiva and sclera clear  ENMT: Moist oral mucosa   NECK: Supple   RESPIRATORY: Normal respiratory effort; lungs are clear to auscultation bilaterally  CARDIOVASCULAR: Regular rate and rhythm, normal S1 and S2, no murmur/rub/gallop; Peripheral pulses are 2+ bilaterally  ABDOMEN: Nontender to palpation, normoactive bowel sounds, no rebound/guarding   MUSCULOSKELETAL: No lower extremity edema   PSYCH: would not discuss,    NEUROLOGY: moves all extremities   SKIN: No rashes    COVID-19 PCR: NotDetec (10 Oct 2024 09:30)  COVID-19 PCR: NotDetec (07 Oct 2024 12:01)  COVID-19 PCR: NotDetec (04 Oct 2024 04:45)      COORDINATION OF CARE:  Communication: discussed plan of care with ACP

## 2024-10-18 NOTE — PROVIDER CONTACT NOTE (OTHER) - ASSESSMENT
Patient refused bedtime fingerstick. Patient became agitated and aggressive when fingerstick was attempted.

## 2024-10-18 NOTE — PROGRESS NOTE ADULT - TIME BILLING
Time-based billing (NON-critical care).     35 minutes spent on total encounter; more than 50% of the visit was spent counseling and / or coordinating care by the attending physician.  The necessity of the time spent during the encounter on this date of service was due to:     review of laboratory data, radiology results, consultants' recommendations, documentation in [place], discussion with patient/ACP and interdisciplinary staff (such as , social workers, etc). Interventions were performed as documented above.
Time-based billing (NON-critical care).     35 minutes spent on total encounter; more than 50% of the visit was spent counseling and / or coordinating care by the attending physician.  The necessity of the time spent during the encounter on this date of service was due to:     review of laboratory data, radiology results, consultants' recommendations, documentation in [place], discussion with patient/ACP and interdisciplinary staff (such as , social workers, etc). Interventions were performed as documented above.
Time-based billing (NON-critical care).     35 minutes spent on total encounter; more than 50% of the visit was spent counseling and / or coordinating care by the attending physician.  The necessity of the time spent during the encounter on this date of service was due to:     review of laboratory data, radiology results, consultants' recommendations, documentation in [place], discussion with patient/ACP and interdisciplinary staff (such as , social workers, etc). Interventions were performed as documented above.
Time-based billing (NON-critical care).     35 minutes spent on total encounter; more than 50% of the visit was spent counseling and / or coordinating care by the attending physician.  The necessity of the time spent during the encounter on this date of service was due to:     review of laboratory data, radiology results, consultants' recommendations, documentation in [place], discussion with patient/ACP and interdisciplinary staff (such as , social workers, etc). Interventions were performed as documented above.
Time-based billing (NON-critical care).     35 minutes spent on total encounter; more than 50% of the visit was spent counseling and / or coordinating care by the attending physician.  The necessity of the time spent during the encounter on this date of service was due to:     review of laboratory data, radiology results, consultants' recommendations, documentation in [place], discussion with patient/ACP and interdisciplinary staff (such as , social workers, etc). Interventions were performed as documented above.
Time-based billing (NON-critical care).     35 minutes spent on total encounter; more than 50% of the visit was spent counseling and / or coordinating care by the attending physician.  The necessity of the time spent during the encounter on this date of service was due to:     review of laboratory data, radiology results, consultants' recommendations, documentation in [place], discussion with patient/ACP and interdisciplinary staff (such as , social workers, etc). Interventions were performed as documented above.
Time-based billing (NON-critical care).     35 minutes spent on total encounter; more than 50% of the visit was spent counseling and / or coordinating care by the attending physician.  The necessity of the time spent during the encounter on this date of service was due to:     review of laboratory data, radiology results, consultants' recommendations, documentation in [place], discussion with patient/ACP and interdisciplinary staff (such as , social workers, etc). Interventions were performed as documented above.
Time-based billing (NON-critical care).     35 minutes spent on total encounter; more than 50% of the visit was spent counseling and / or coordinating care by the attending physician.  The necessity of the time spent during the encounter on this date of service was due to:     review of laboratory data, radiology results, consultants' recommendations, documentation in [place], discussion with patient/ACP and interdisciplinary staff (such as , social workers, etc). Interventions were performed as documented above.
Time-based billing (NON-critical care).     35 minutes spent on total encounter; more than 50% of the visit was spent counseling and / or coordinating care by the attending physician.  The necessity of the time spent during the encounter on this date of service was due to:     review of laboratory data, radiology results, consultants' recommendations, documentation in [place], discussion with patient/ACP and interdisciplinary staff (such as , social workers, etc). Interventions were performed as documented above.
Time-based billing (NON-critical care).     35 minutes spent on total encounter; more than 50% of the visit was spent counseling and / or coordinating care by the attending physician.  The necessity of the time spent during the encounter on this date of service was due to:     review of laboratory data, radiology results, consultants' recommendations, documentation in [place], discussion with patient/ACP and interdisciplinary staff (such as , social workers, etc). Interventions were performed as documented above.
Time-based billing (NON-critical care).     50 minutes spent on total encounter; more than 50% of the visit was spent counseling and / or coordinating care by the attending physician.  The necessity of the time spent during the encounter on this date of service was due to:     review of laboratory data, radiology results, consultants' recommendations, documentation in [place], discussion with patient/ACP and interdisciplinary staff (such as , social workers, etc). Interventions were performed as documented above.

## 2024-10-18 NOTE — PROGRESS NOTE ADULT - TIME-BASED
12/20/18 1822   Provider Notification   Provider Name/Title Dr Tyler   Method of Notification Electronic Page;Phone   Notification Reason Patient Arrived;SVE   pt laboring, sve, fhts, preop orders received.  
35
50
35

## 2024-10-18 NOTE — BH CONSULTATION LIAISON PROGRESS NOTE - NSBHASSESSMENTFT_PSY_ALL_CORE
Ms Jorge Alberto is a 74 yo F w history of SCZ, Parkinson's dz, hypothyroidism, and multiple psych hospitalizations who presents for AMS. Pt transferred from [facility], where she was found to have fallen and was covered in urine. Pt has been hospitalized in [facility] with occasional stays at [facility] for medical concerns since the start of this year for psychosis. Per [facility] notes, pt appears to be awaiting transfer to [facility] hospital. Pt has been delusional at [facility] with recent delusions surrounding pregnancy. Current medications are Depakote 750 mg bid, Seroquel 50 mg bid, and Prolixin Dec 12.5 mg IM q2w (last dose 9/24). Psychiatry was consulted for continued psychiatric care.    9/30: Pt continues to be paranoid but no agitation noted. EEG done; no seizures recorded. Pending ongoing medical work up.  10/1: Pt continues to be paranoid; no prns required. Ongoing medial work up; once done plan to return to [facility].  10/2: No change today. Once optimized, will return to [facility]. 2PC completed.  10/3: No changes. Continue meds as is.  10/4: Agitation yesterday requiring prn and restraints. Okay today but continues to be paranoid. Pending transfer to [facility]. Low VPA level but missed dose yesterday; not accurate level.  10/7: Agitated over weekend, requiring restraints and prns. Increase Seroquel for paranoia/agitation. Once pt has been taking consistent dose of Depakote, repeat level. Pending [facility] transfer.  10/9-- paranoid, agitated easily  10/11: Continued paranoia, irritability, delusions. Pending [facility] transfer. 2PC updated and in the chart.  10/17---disorganized, hostile, with delusions, AH  10/18: Irritable, paranoid. No prns required. Pending [facility].    Recs:  - Continue Depakote 1000mg bid PO. Will continue to encourage compliance.   - Continue Seroquel 75 mg bid po  	- hold if QTC > 500  - Continue Prolixin Dec 12.5 mg IM last dose 10/9  - consider Zyprexa 1.25 mg po/IM q6h prn for agitation if warranted; may give additional Zyprexa 1.25 mg if first dose is ineffective. hold for QTC > 500  - Continue to monitor VPA level   - Obs: started on 1:1 by team, recommend to  continue CO 1:1 for impulsivity/recent agitation  - Dispo: TBD,  return to [facility] once medically optimized; 2PC completed and in the chart 10/11/24   Ms Jorge Alberto is a 72 yo F w history of SCZ, Parkinson's dz, hypothyroidism, and multiple psych hospitalizations who presents for AMS. Pt transferred from [facility], where she was found to have fallen and was covered in urine. Pt has been hospitalized in [facility] with occasional stays at [facility] for medical concerns since the start of this year for psychosis. Per [facility] notes, pt appears to be awaiting transfer to [facility] hospital. Pt has been delusional at [facility] with recent delusions surrounding pregnancy. Current medications are Depakote 750 mg bid, Seroquel 50 mg bid, and Prolixin Dec 12.5 mg IM q2w (last dose 9/24). Psychiatry was consulted for continued psychiatric care.    9/30: Pt continues to be paranoid but no agitation noted. EEG done; no seizures recorded. Pending ongoing medical work up.  10/1: Pt continues to be paranoid; no prns required. Ongoing medial work up; once done plan to return to [facility].  10/2: No change today. Once optimized, will return to [facility]. 2PC completed.  10/3: No changes. Continue meds as is.  10/4: Agitation yesterday requiring prn and restraints. Okay today but continues to be paranoid. Pending transfer to [facility]. Low VPA level but missed dose yesterday; not accurate level.  10/7: Agitated over weekend, requiring restraints and prns. Increase Seroquel for paranoia/agitation. Once pt has been taking consistent dose of Depakote, repeat level. Pending [facility] transfer.  10/9-- paranoid, agitated easily  10/11: Continued paranoia, irritability, delusions. Pending [facility] transfer. 2PC updated and in the chart.  10/17---disorganized, hostile, with delusions, AH  10/18: Irritable, paranoid. No prns required. Pending [facility].    Recs:  - Continue Depakote 1000mg bid PO. Will continue to encourage compliance.   - Continue Seroquel 75 mg bid po  	- hold if QTC > 500  - Continue Prolixin Dec 12.5 mg IM  q2w ***last dose 10/9  - consider Zyprexa 1.25 mg po/IM q6h prn for agitation if warranted; may give additional Zyprexa 1.25 mg if first dose is ineffective. hold for QTC > 500  - Continue to monitor VPA level   - Obs: started on 1:1 by team, recommend to  continue CO 1:1 for impulsivity/recent agitation  - Dispo: TBD,  return to [facility] once medically optimized; 2PC completed and in the chart 10/11/24

## 2024-10-19 LAB
GLUCOSE BLDC GLUCOMTR-MCNC: 195 MG/DL — HIGH (ref 70–99)
GLUCOSE BLDC GLUCOMTR-MCNC: 217 MG/DL — HIGH (ref 70–99)

## 2024-10-19 PROCEDURE — 99232 SBSQ HOSP IP/OBS MODERATE 35: CPT

## 2024-10-19 RX ADMIN — QUETIAPINE FUMARATE 75 MILLIGRAM(S): 200 TABLET ORAL at 22:33

## 2024-10-19 RX ADMIN — Medication 1000 UNIT(S): at 14:38

## 2024-10-19 RX ADMIN — Medication 6 MILLIGRAM(S): at 22:33

## 2024-10-19 RX ADMIN — QUETIAPINE FUMARATE 75 MILLIGRAM(S): 200 TABLET ORAL at 14:37

## 2024-10-19 RX ADMIN — Medication 1 TABLET(S): at 14:38

## 2024-10-19 NOTE — PROGRESS NOTE ADULT - PROBLEM SELECTOR PLAN 2
- admission at [facility] since May  - was on fluphanzine prn for agitation there, will monitor for now and tx symptomatically for agitation based on team assessment  - mgmt with meds as above  - c/w melatonin qhs  - to return to [facility], awaiting placement

## 2024-10-19 NOTE — PROGRESS NOTE ADULT - SUBJECTIVE AND OBJECTIVE BOX
Dr. Vickie Cheek  Pager [phone]    PROGRESS NOTE:     Patient is a 73y old  Female who presents with a chief complaint of AMS (18 Oct 2024 16:14)      SUBJECTIVE / OVERNIGHT EVENTS: denies  chest pain or sob , standing beside her bed  ADDITIONAL REVIEW OF SYSTEMS: afebrile     MEDICATIONS  (STANDING):  chlorhexidine 2% Cloths 1 Application(s) Topical <User Schedule>  cholecalciferol 1000 Unit(s) Oral daily  dextrose 5%. 1000 milliLiter(s) (100 mL/Hr) IV Continuous <Continuous>  dextrose 5%. 1000 milliLiter(s) (50 mL/Hr) IV Continuous <Continuous>  dextrose 50% Injectable 25 Gram(s) IV Push once  dextrose 50% Injectable 12.5 Gram(s) IV Push once  dextrose 50% Injectable 25 Gram(s) IV Push once  divalproex Sprinkle 1000 milliGRAM(s) Oral two times a day  enoxaparin Injectable 40 milliGRAM(s) SubCutaneous every 24 hours  fluticasone propionate 50 MICROgram(s)/spray Nasal Spray 1 Spray(s) Both Nostrils two times a day  glucagon  Injectable 1 milliGRAM(s) IntraMuscular once  insulin lispro (ADMELOG) corrective regimen sliding scale   SubCutaneous three times a day before meals  insulin lispro (ADMELOG) corrective regimen sliding scale   SubCutaneous at bedtime  levothyroxine 75 MICROGram(s) Oral daily  levothyroxine      melatonin 6 milliGRAM(s) Oral at bedtime  metoprolol succinate ER 50 milliGRAM(s) Oral daily  multivitamin 1 Tablet(s) Oral daily  mupirocin 2% Nasal 1 Application(s) Both Nostrils two times a day  QUEtiapine 75 milliGRAM(s) Oral daily  QUEtiapine 75 milliGRAM(s) Oral at bedtime  senna 2 Tablet(s) Oral at bedtime    MEDICATIONS  (PRN):  acetaminophen     Tablet .. 650 milliGRAM(s) Oral every 6 hours PRN Temp greater or equal to 38C (100.4F), Mild Pain (1 - 3)  albuterol    90 MICROgram(s) HFA Inhaler 2 Puff(s) Inhalation every 6 hours PRN Shortness of Breath and/or Wheezing  aluminum hydroxide/magnesium hydroxide/simethicone Suspension 30 milliLiter(s) Oral every 4 hours PRN Dyspepsia  bisacodyl 5 milliGRAM(s) Oral every 12 hours PRN Constipation  dextrose Oral Gel 15 Gram(s) Oral once PRN Blood Glucose LESS THAN 70 milliGRAM(s)/deciliter  OLANZapine Injectable 3.75 milliGRAM(s) IntraMuscular every 6 hours PRN agitation  simethicone 80 milliGRAM(s) Chew three times a day PRN Gas      CAPILLARY BLOOD GLUCOSE        I&O's Summary    18 Oct 2024 07:01  -  19 Oct 2024 07:00  --------------------------------------------------------  IN: 0 mL / OUT: 300 mL / NET: -300 mL        PHYSICAL EXAM:  Vital Signs Last 24 Hrs  T(C): 36.4 (18 Oct 2024 22:30), Max: 36.4 (18 Oct 2024 22:30)  T(F): 97.6 (18 Oct 2024 22:30), Max: 97.6 (18 Oct 2024 22:30)  HR: 81 (18 Oct 2024 22:30) (81 - 81)  BP: 155/92 (18 Oct 2024 22:30) (155/92 - 155/92)  BP(mean): --  RR: 18 (18 Oct 2024 22:30) (18 - 18)  SpO2: 98% (18 Oct 2024 22:30) (98% - 98%)    Parameters below as of 18 Oct 2024 22:30  Patient On (Oxygen Delivery Method): room air      CONSTITUTIONAL: frail elderly lady  RESPIRATORY: Normal respiratory effort; lungs are clear to auscultation bilaterally  CARDIOVASCULAR: Regular rate and rhythm, normal S1 and S2, no murmur/rub/gallop; No lower extremity edema; Peripheral pulses are 2+ bilaterally  ABDOMEN: Nontender to palpation, normoactive bowel sounds, no rebound/guarding; No hepatosplenomegaly  MUSCULOSKELETAL: no clubbing or cyanosis of digits; no joint swelling or tenderness to palpation  PSYCH: denies hallucination, a/ox2    LABS:                      RADIOLOGY & ADDITIONAL TESTS:  Results Reviewed:   Imaging Personally Reviewed:  Electrocardiogram Personally Reviewed:    COORDINATION OF CARE:  Care Discussed with Consultants/Other Providers [Y/N]:  Prior or Outpatient Records Reviewed [Y/N]:

## 2024-10-19 NOTE — PROGRESS NOTE ADULT - PROBLEM SELECTOR PLAN 1
- now appears at baseline  - s/p stroke code with cta, cth, and ct perfusion neg for acute pathology  - neuro consulted; recs appreciated; EEG without seizure activity  - pt declined MRI, per neurology does not need this to be completed and has low suspicion for CVA  - toxic metabolic encephalopathy ; infectious w/u negative; echo unremarkable  - continue valproic acid sprinkles 1000 mg BID  - Continue Seroquel 75 mg bid   - Next Prolixin Dec 12.5 mg IM q2w due 10/23/24  - awaiting bed at [facility]    #dilated pupil  -baseline anisocoria (OD 8mm, OS 1mm) per chart review

## 2024-10-20 LAB
GLUCOSE BLDC GLUCOMTR-MCNC: 142 MG/DL — HIGH (ref 70–99)
GLUCOSE BLDC GLUCOMTR-MCNC: 222 MG/DL — HIGH (ref 70–99)
GLUCOSE BLDC GLUCOMTR-MCNC: 223 MG/DL — HIGH (ref 70–99)

## 2024-10-20 PROCEDURE — 93010 ELECTROCARDIOGRAM REPORT: CPT

## 2024-10-20 PROCEDURE — 99232 SBSQ HOSP IP/OBS MODERATE 35: CPT

## 2024-10-20 RX ORDER — VALPROIC ACID 250 MG/5ML
500 SOLUTION ORAL EVERY 6 HOURS
Refills: 0 | Status: DISCONTINUED | OUTPATIENT
Start: 2024-10-20 | End: 2024-10-20

## 2024-10-20 RX ORDER — DIVALPROEX SODIUM 250 MG/1
1000 TABLET, FILM COATED, EXTENDED RELEASE ORAL EVERY 12 HOURS
Refills: 0 | Status: DISCONTINUED | OUTPATIENT
Start: 2024-10-20 | End: 2024-10-21

## 2024-10-20 RX ADMIN — QUETIAPINE FUMARATE 75 MILLIGRAM(S): 200 TABLET ORAL at 09:41

## 2024-10-20 RX ADMIN — QUETIAPINE FUMARATE 75 MILLIGRAM(S): 200 TABLET ORAL at 22:38

## 2024-10-20 RX ADMIN — CHLORHEXIDINE GLUCONATE 1 APPLICATION(S): 40 SOLUTION TOPICAL at 07:51

## 2024-10-20 RX ADMIN — Medication 2 TABLET(S): at 22:39

## 2024-10-20 RX ADMIN — Medication 1 TABLET(S): at 11:57

## 2024-10-20 RX ADMIN — Medication 2: at 13:10

## 2024-10-20 RX ADMIN — DIVALPROEX SODIUM 1000 MILLIGRAM(S): 250 TABLET, FILM COATED, EXTENDED RELEASE ORAL at 18:50

## 2024-10-20 RX ADMIN — Medication 50 MILLIGRAM(S): at 12:25

## 2024-10-20 RX ADMIN — Medication 6 MILLIGRAM(S): at 22:40

## 2024-10-20 RX ADMIN — DIVALPROEX SODIUM 1000 MILLIGRAM(S): 250 TABLET, FILM COATED, EXTENDED RELEASE ORAL at 12:22

## 2024-10-20 RX ADMIN — Medication 1000 UNIT(S): at 11:51

## 2024-10-20 NOTE — PROGRESS NOTE ADULT - SUBJECTIVE AND OBJECTIVE BOX
Dr. Vickie Cheek  Pager [phone]    PROGRESS NOTE:     Patient is a 73y old  Female who presents with a chief complaint of AMS (19 Oct 2024 14:00)      SUBJECTIVE / OVERNIGHT EVENTS: denies chest pain or sob , refusing morning vitals  ADDITIONAL REVIEW OF SYSTEMS: ambulating in her room    MEDICATIONS  (STANDING):  chlorhexidine 2% Cloths 1 Application(s) Topical <User Schedule>  cholecalciferol 1000 Unit(s) Oral daily  dextrose 5%. 1000 milliLiter(s) (100 mL/Hr) IV Continuous <Continuous>  dextrose 5%. 1000 milliLiter(s) (50 mL/Hr) IV Continuous <Continuous>  dextrose 50% Injectable 25 Gram(s) IV Push once  dextrose 50% Injectable 12.5 Gram(s) IV Push once  dextrose 50% Injectable 25 Gram(s) IV Push once  divalproex Sprinkle 1000 milliGRAM(s) Oral every 12 hours  enoxaparin Injectable 40 milliGRAM(s) SubCutaneous every 24 hours  glucagon  Injectable 1 milliGRAM(s) IntraMuscular once  insulin lispro (ADMELOG) corrective regimen sliding scale   SubCutaneous three times a day before meals  insulin lispro (ADMELOG) corrective regimen sliding scale   SubCutaneous at bedtime  levothyroxine 75 MICROGram(s) Oral daily  levothyroxine      melatonin 6 milliGRAM(s) Oral at bedtime  metoprolol succinate ER 50 milliGRAM(s) Oral daily  multivitamin 1 Tablet(s) Oral daily  mupirocin 2% Nasal 1 Application(s) Both Nostrils two times a day  QUEtiapine 75 milliGRAM(s) Oral daily  QUEtiapine 75 milliGRAM(s) Oral at bedtime  senna 2 Tablet(s) Oral at bedtime    MEDICATIONS  (PRN):  acetaminophen     Tablet .. 650 milliGRAM(s) Oral every 6 hours PRN Temp greater or equal to 38C (100.4F), Mild Pain (1 - 3)  albuterol    90 MICROgram(s) HFA Inhaler 2 Puff(s) Inhalation every 6 hours PRN Shortness of Breath and/or Wheezing  aluminum hydroxide/magnesium hydroxide/simethicone Suspension 30 milliLiter(s) Oral every 4 hours PRN Dyspepsia  bisacodyl 5 milliGRAM(s) Oral every 12 hours PRN Constipation  dextrose Oral Gel 15 Gram(s) Oral once PRN Blood Glucose LESS THAN 70 milliGRAM(s)/deciliter  OLANZapine Injectable 3.75 milliGRAM(s) IntraMuscular every 6 hours PRN agitation      CAPILLARY BLOOD GLUCOSE      POCT Blood Glucose.: 217 mg/dL (19 Oct 2024 22:31)  POCT Blood Glucose.: 195 mg/dL (19 Oct 2024 17:57)    I&O's Summary      PHYSICAL EXAM:  Vital Signs Last 24 Hrs  T(C): --  T(F): --  HR: --  BP: --  BP(mean): --  RR: --  SpO2: --      CONSTITUTIONAL: frail elderly lady  RESPIRATORY: Normal respiratory effort; lungs are clear to auscultation bilaterally  CARDIOVASCULAR: Regular rate and rhythm, normal S1 and S2, no murmur/rub/gallop; No lower extremity edema; Peripheral pulses are 2+ bilaterally  ABDOMEN: Nontender to palpation, normoactive bowel sounds, no rebound/guarding; No hepatosplenomegaly  MUSCULOSKELETAL: no clubbing or cyanosis of digits; no joint swelling or tenderness to palpation  PSYCH: denies hallucination, a/ox1-2    LABS:                      RADIOLOGY & ADDITIONAL TESTS:  Results Reviewed:   Imaging Personally Reviewed:  Electrocardiogram Personally Reviewed:    COORDINATION OF CARE:  Care Discussed with Consultants/Other Providers [Y/N]:  Prior or Outpatient Records Reviewed [Y/N]:

## 2024-10-20 NOTE — PROGRESS NOTE ADULT - PROBLEM SELECTOR PLAN 6
- not on active meds at present, previously on cardiopa-levodopa, monitor
- not on active meds at present, previously on carbidopa-levodopa, monitor
- not on active meds at present, previously on cardiopa-levodopa, monitor
- not on active meds at present, previously on carbidopa-levodopa, monitor

## 2024-10-20 NOTE — PROVIDER CONTACT NOTE (OTHER) - DATE AND TIME:
07-Oct-2024 22:00
16-Oct-2024 16:09
12-Oct-2024 22:55
13-Oct-2024 18:56
13-Oct-2024 21:29
14-Oct-2024 06:16
14-Oct-2024 00:05
20-Oct-2024 08:12
08-Oct-2024 19:01
16-Oct-2024 09:03
20-Oct-2024 23:30
30-Sep-2024 06:20
08-Oct-2024 09:35
12-Oct-2024 05:55
02-Oct-2024 06:47
02-Oct-2024 15:55
02-Oct-2024 18:13
08-Oct-2024 06:00
16-Oct-2024 12:19
17-Oct-2024 22:00
17-Oct-2024 22:15

## 2024-10-20 NOTE — PROGRESS NOTE ADULT - PROBLEM SELECTOR PROBLEM 2
MDD (major depressive disorder), recurrent episode

## 2024-10-20 NOTE — PROVIDER CONTACT NOTE (OTHER) - BACKGROUND
patient has hx of noncompliance
Patient admitted for altered mental status
patient admitted for AMS; pmh of diabetes mellitus
patient admitted for AMS; pmh of diabetes mellitus
Patient admitted for altered mental status
Patient admitted for altered mental status
patient admitted for AMS; pmh of diabetes mellitus
Admit for dizziness
72 y/o F PMHx dementia, seizure d/o, depression, HTN, HLD found down at [facility].
74 y/o F PMHx HTN, HLD, T2DM, MDD, Parkinson's Disease, Hypothyroidism, Seizure Disorder presented from [facility] after noted to have unwitnessed fall, lying on side, disoriented, slurring her words.
74 y/o F with PMHx of HTN, HLD, T2DM, Parkinson's Disease, Hypothyroidism, Seizure disorder p/w [facility] after an unwitnessed fall, disorientation, word slurring, and episodes of urinary incontinence.
Patient admitted for altered mental status
PMH T2DM, parkinson's, hypothyroidism, seizure disorder  patient admitted with unwitnessed fall
Patient admitted for AMS. History of HLD, DM, seizure, Schizophrenia
Patient admitted for AMS. History of HLD, DM, seizure, Schizophrenia
Patient admitted with altered mental status
74 y/o F PMHx HTN, HLD, T2DM, MDD, Parkison's Disease, Hypothyroidism, Seizure disorder p/w [facility] after unwitnessed fall, disoriented, slurring her words, urinary incontinence.
Patient admitted for AMS. History of HTN,HLD, schizophrenia, seizure, DM
patient admitted for AMS; pmh of diabetes mellitus
patient admitted for AMS; pmh of diabetes mellitus

## 2024-10-20 NOTE — PROVIDER CONTACT NOTE (OTHER) - NAME OF MD/NP/PA/DO NOTIFIED:
ACP Louis Marina
JOSIANE Blevins
JOSIANE Irizarry
TAYO Ambrosio
ACP Janice Jose
Joyce Alamo
Elizabeth Tracy ACP
JOSIANE Tracy
Joyce Alamo
On call resident
Elizabeth Tracy ACP
Henry Cordoba
Jonathan Jin
Fabiola Irizarry
Fabiola Irizarry
JOSIANE Blevins
JOSIANE Irizarry
Fabiola Irizarry
Fabiola Malave
JOSIANE Blevins
Joyce Alamo
(0) independent

## 2024-10-20 NOTE — PROGRESS NOTE ADULT - PROBLEM SELECTOR PROBLEM 7
Medication management
Medication management
Need for prophylactic measure
Need for prophylactic measure
Medication management
Need for prophylactic measure
Medication management
Need for prophylactic measure
Medication management
Medication management
Need for prophylactic measure
Medication management
Need for prophylactic measure
Need for prophylactic measure
Medication management
Medication management
Need for prophylactic measure

## 2024-10-20 NOTE — PROGRESS NOTE ADULT - PROBLEM SELECTOR PLAN 2
- admission at [facility] since May  - was on fluphanzine prn for agitation there, will monitor for now and tx symptomatically for agitation based on team assessment  - mgmt with meds as above  - c/w melatonin qhs  - to return to [facility], awaiting placement

## 2024-10-20 NOTE — PROGRESS NOTE ADULT - PROBLEM SELECTOR PLAN 3
psych following
psych following, on zyprexa/seroquel/depakote, f/up BH
psych following
as above  psych following
psych following
as above
psych following
as above
as above  psych following
psych following, on zyprexa/seroquel/depakote, f/up BH
psych following
as above  psych following
as above  psych following
psych following

## 2024-10-20 NOTE — PROGRESS NOTE ADULT - PROBLEM SELECTOR PLAN 1
- now appears at baseline  - s/p stroke code with cta, cth, and ct perfusion neg for acute pathology  - neuro consulted; recs appreciated; EEG without seizure activity  - pt declined MRI, per neurology does not need this to be completed and has low suspicion for CVA  - toxic metabolic encephalopathy ; infectious w/u negative; echo unremarkable  - continue valproic acid sprinkles 1000 mg BID  - Continue Seroquel 75 mg bid   - Next Prolixin Dec 12.5 mg IM q2w due 10/23/24  - awaiting bed at [facility]    #dilated pupil  -baseline anisocoria (OD 8mm, OS 1mm) per chart review

## 2024-10-20 NOTE — PROGRESS NOTE ADULT - PROBLEM SELECTOR PLAN 7
- DVT ppx: lovenox  - Diet: minced and moist  - Full code
#bowel regimen  - c/w lactulose daily with senna qhs    - c/w vitamin D daily
- DVT ppx: lovenox  - Diet: minced and moist  - Full code
#bowel regimen  - c/w lactulose daily with senna qhs    - c/w vitamin D daily
- DVT ppx: lovenox  - Diet: minced and moist  - Full code
#bowel regimen  - c/w lactulose daily with senna qhs    - c/w vitamin D daily
#bowel regimen  - c/w lactulose daily with senna qhs    - c/w vitamin D daily
- DVT ppx: lovenox  - Diet: minced and moist  - Full code
#bowel regimen  - c/w lactulose daily with senna qhs    - c/w vitamin D daily
#bowel regimen  - c/w lactulose daily with senna qhs    - c/w vitamin D daily
- DVT ppx: lovenox  - Diet: minced and moist  - Full code
- DVT ppx: lovenox  - Diet: minced and moist  - Full code
#bowel regimen  - c/w lactulose daily with senna qhs    - c/w vitamin D daily
- DVT ppx: lovenox  - Diet: minced and moist  - Full code
#bowel regimen  - c/w lactulose daily with senna qhs    - c/w vitamin D daily
- DVT ppx: lovenox  - Diet: minced and moist  - Full code
#bowel regimen  - c/w lactulose daily with senna qhs    - c/w vitamin D daily

## 2024-10-20 NOTE — PROGRESS NOTE ADULT - PROBLEM SELECTOR PROBLEM 8
Medication management
Need for prophylactic measure
Medication management
Medication management
Need for prophylactic measure
Medication management
Need for prophylactic measure
Need for prophylactic measure
Medication management
Medication management
Need for prophylactic measure
Need for prophylactic measure
Medication management
Need for prophylactic measure
Need for prophylactic measure
Medication management
Need for prophylactic measure

## 2024-10-20 NOTE — PROGRESS NOTE ADULT - PROBLEM SELECTOR PROBLEM 6
Parkinson disease

## 2024-10-20 NOTE — PROGRESS NOTE ADULT - PROBLEM SELECTOR PROBLEM 5
Type II diabetes mellitus

## 2024-10-20 NOTE — PROVIDER CONTACT NOTE (OTHER) - ASSESSMENT
Patient is A&O 1-2, denies any chest pain, dizziness or SOB. No s/s of distress noted. Patient getting agitated when trying to educate her and get her to allow VS to be taken.

## 2024-10-20 NOTE — PROVIDER CONTACT NOTE (OTHER) - ACTION/TREATMENT ORDERED:
No new orders have been placed at this time. Daily meds were pushed to a later time to try and reattempt administration.

## 2024-10-20 NOTE — PROGRESS NOTE ADULT - PROBLEM SELECTOR PROBLEM 4
Hypothyroidism

## 2024-10-20 NOTE — PROGRESS NOTE ADULT - ASSESSMENT
79-year-old woman with epilepsy, Parkinson's disease, major depressive disorder who was brought in after an episode concerning for possible unwitnessed seizure.  Currently free of clinical or electrographic seizure activity, but as her VPA level is on the low end of therapeutic range and she may have had an unwitnessed seizure prior to arrival, reasonable to increase dose slightly.      Increase Depakte to 1g BID.  Check trough level prior to discharge.  Disconnect EEG.    Neurology will sign off.  Please call with any questions.        
74 y/o F with PMH of recurrent major depressive disorder, htn, T2DM, hld, parkinson's disease (not on active meds), hypothyroidism, hx of seizure disorder  who presented from [facility] after unwitnessed fall , lying on L side, disoriented, slurring her words, episode of urinary incontinence without recollection of preceding events. Stroke code on presentation. 
72 y/o F with PMH of recurrent major depressive disorder, htn, T2DM, hld, parkinson's disease (not on active meds), hypothyroidism, hx of seizure disorder  who presented from [facility] after unwitnessed fall , lying on L side, disoriented, slurring her words, episode of urinary incontinence without recollection of preceding events. Stroke code on presentation. Now medically cleared. Pending [facility] transfer 
72 y/o F with PMH of recurrent major depressive disorder, htn, T2DM, hld, parkinson's disease (not on active meds), hypothyroidism, hx of seizure disorder  who presented from [facility] after unwitnessed fall , lying on L side, disoriented, slurring her words, episode of urinary incontinence without recollection of preceding events. Stroke code on presentation. Now medically cleared. Pending [facility] transfer 
This is a 72 y/o F with PMH of recurrent major depressive disorder, htn, niddmt2, hld, parkinson's disease (not on active meds), hypothyroidism, hx of seizure disorder per chart review who presented from [facility] overnight after noted to have unwitnessed fall around 0028 hours, lying on L side, disoriented, slurring her words, episode of urinary incontinence without recollection of preceding events. Stroke code on presentation. Admitted for further workup for possible seizure. 
This is a 72 y/o F with PMH of recurrent major depressive disorder, htn, niddmt2, hld, parkinson's disease (not on active meds), hypothyroidism, hx of seizure disorder per chart review who presented from [facility] overnight after noted to have unwitnessed fall around 0028 hours, lying on L side, disoriented, slurring her words, episode of urinary incontinence without recollection of preceding events. Stroke code on presentation. Admitted for further workup for possible seizure. 
72 y/o F with PMH of recurrent major depressive disorder, htn, T2DM, hld, parkinson's disease (not on active meds), hypothyroidism, hx of seizure disorder  who presented from [facility] after unwitnessed fall , lying on L side, disoriented, slurring her words, episode of urinary incontinence without recollection of preceding events. Stroke code on presentation. 
74 y/o F with PMH of recurrent major depressive disorder, htn, niddmt2, hld, parkinson's disease (not on active meds), hypothyroidism, hx of seizure disorder per chart review who presented from [facility] after unwitnessed fall , lying on L side, disoriented, slurring her words, episode of urinary incontinence without recollection of preceding events. Stroke code on presentation. Admitted for further workup for possible seizure. 
This is a 72 y/o F with PMH of recurrent major depressive disorder, htn, niddmt2, hld, parkinson's disease (not on active meds), hypothyroidism, hx of seizure disorder per chart review who presented from [facility] overnight after noted to have unwitnessed fall around 0028 hours, lying on L side, disoriented, slurring her words, episode of urinary incontinence without recollection of preceding events. Stroke code on presentation. Admitted for further workup for possible seizure. 
72 y/o F with PMH of recurrent major depressive disorder, htn, T2DM, hld, parkinson's disease (not on active meds), hypothyroidism, hx of seizure disorder  who presented from [facility] after unwitnessed fall , lying on L side, disoriented, slurring her words, episode of urinary incontinence without recollection of preceding events. Stroke code on presentation. Now medically cleared. Pending [facility] transfer 
This is a 72 y/o F with PMH of recurrent major depressive disorder, htn, niddmt2, hld, parkinson's disease (not on active meds), hypothyroidism, hx of seizure disorder per chart review who presented from [facility] overnight after noted to have unwitnessed fall around 0028 hours, lying on L side, disoriented, slurring her words, episode of urinary incontinence without recollection of preceding events. Stroke code on presentation. Admitted for further workup for possible seizure. 
This is a 72 y/o F with PMH of recurrent major depressive disorder, htn, niddmt2, hld, parkinson's disease (not on active meds), hypothyroidism, hx of seizure disorder per chart review who presented from [facility] overnight after noted to have unwitnessed fall around 0028 hours, lying on L side, disoriented, slurring her words, episode of urinary incontinence without recollection of preceding events. Stroke code on presentation. Admitted for further workup for possible seizure. 
This is a 74 y/o F with PMH of recurrent major depressive disorder, htn, niddmt2, hld, parkinson's disease (not on active meds), hypothyroidism, hx of seizure disorder per chart review who presented from [facility] overnight after noted to have unwitnessed fall around 0028 hours, lying on L side, disoriented, slurring her words, episode of urinary incontinence without recollection of preceding events. Stroke code on presentation. Admitted for further workup for possible seizure. 
This is a 74 y/o F with PMH of recurrent major depressive disorder, htn, niddmt2, hld, parkinson's disease (not on active meds), hypothyroidism, hx of seizure disorder per chart review who presented from [facility] overnight after noted to have unwitnessed fall around 0028 hours, lying on L side, disoriented, slurring her words, episode of urinary incontinence without recollection of preceding events. Stroke code on presentation. Admitted for further workup for possible seizure. 
This is a 74 y/o F with PMH of recurrent major depressive disorder, htn, niddmt2, hld, parkinson's disease (not on active meds), hypothyroidism, hx of seizure disorder per chart review who presented from [facility] overnight after noted to have unwitnessed fall around 0028 hours, lying on L side, disoriented, slurring her words, episode of urinary incontinence without recollection of preceding events. Stroke code on presentation. Admitted for further workup for possible seizure. 
72 y/o F with PMH of recurrent major depressive disorder, htn, T2DM, hld, parkinson's disease (not on active meds), hypothyroidism, hx of seizure disorder  who presented from [facility] after unwitnessed fall , lying on L side, disoriented, slurring her words, episode of urinary incontinence without recollection of preceding events. Stroke code on presentation. Admitted for further workup for possible seizure. 
72 y/o F with PMH of recurrent major depressive disorder, htn, T2DM, hld, parkinson's disease (not on active meds), hypothyroidism, hx of seizure disorder  who presented from [facility] after unwitnessed fall , lying on L side, disoriented, slurring her words, episode of urinary incontinence without recollection of preceding events. Stroke code on presentation. Now medically cleared. Pending [facility] transfer 
72 y/o F with PMH of recurrent major depressive disorder, htn, T2DM, hld, parkinson's disease (not on active meds), hypothyroidism, hx of seizure disorder  who presented from [facility] after unwitnessed fall , lying on L side, disoriented, slurring her words, episode of urinary incontinence without recollection of preceding events. Stroke code on presentation. 
74 y/o F with PMH of recurrent major depressive disorder, htn, T2DM, hld, parkinson's disease (not on active meds), hypothyroidism, hx of seizure disorder  who presented from [facility] after unwitnessed fall , lying on L side, disoriented, slurring her words, episode of urinary incontinence without recollection of preceding events. Stroke code on presentation. Now medically cleared. Pending [facility] transfer 
72 y/o F with PMH of recurrent major depressive disorder, htn, T2DM, hld, parkinson's disease (not on active meds), hypothyroidism, hx of seizure disorder  who presented from [facility] after unwitnessed fall , lying on L side, disoriented, slurring her words, episode of urinary incontinence without recollection of preceding events. Stroke code on presentation. 
72 y/o F with PMH of recurrent major depressive disorder, htn, T2DM, hld, parkinson's disease (not on active meds), hypothyroidism, hx of seizure disorder  who presented from [facility] after unwitnessed fall , lying on L side, disoriented, slurring her words, episode of urinary incontinence without recollection of preceding events. Stroke code on presentation. 
72 y/o F with PMH of recurrent major depressive disorder, htn, T2DM, hld, parkinson's disease (not on active meds), hypothyroidism, hx of seizure disorder  who presented from [facility] after unwitnessed fall , lying on L side, disoriented, slurring her words, episode of urinary incontinence without recollection of preceding events. Stroke code on presentation.

## 2024-10-20 NOTE — PROGRESS NOTE ADULT - PROVIDER SPECIALTY LIST ADULT
Hospitalist
Hospitalist
Neurology
Hospitalist

## 2024-10-20 NOTE — PROGRESS NOTE ADULT - PROBLEM SELECTOR PROBLEM 3
Schizophrenia

## 2024-10-20 NOTE — PROVIDER CONTACT NOTE (OTHER) - REASON
Patient refused q8 vitals
Patient refusing AM vital signs and medications
Pt refused going to MRI and no current IV
Pt requested IV be removed and maybe try to get another one in the AM
Patient refused afternoon fingerstick and insulin
Patient refusing all pm medications
Clarification
Patient is refusing her bedtime FS.
Patient refused 6PM meds and last q4 vitals
Patient refused bedtime fingerstick.
Patient refuses morning meds
Patient refusing morning fingerstick
Patient is agitated and aggressive( threatening staff to punch them)
Patient refused morning fingerstick
Pt refused synthroid and flonase
Pt refusing 1 unit of insulin
patient refusing all interventions at this time
Patient refused IV, meds, FS, and VS
Tachy
refusing medication
Patient is refusing VS.

## 2024-10-21 ENCOUNTER — INPATIENT (INPATIENT)
Facility: HOSPITAL | Age: 74
LOS: 99 days | Discharge: PSYCHIATRIC OR SUBSTANCE USE FACILITY | End: 2025-01-29
Attending: PSYCHIATRY & NEUROLOGY | Admitting: PSYCHIATRY & NEUROLOGY
Payer: MEDICAID

## 2024-10-21 ENCOUNTER — NON-APPOINTMENT (OUTPATIENT)
Age: 74
End: 2024-10-21

## 2024-10-21 ENCOUNTER — TRANSCRIPTION ENCOUNTER (OUTPATIENT)
Age: 74
End: 2024-10-21

## 2024-10-21 VITALS
TEMPERATURE: 98 F | RESPIRATION RATE: 17 BRPM | SYSTOLIC BLOOD PRESSURE: 140 MMHG | HEART RATE: 99 BPM | DIASTOLIC BLOOD PRESSURE: 100 MMHG | OXYGEN SATURATION: 99 %

## 2024-10-21 VITALS — RESPIRATION RATE: 16 BRPM | HEIGHT: 60.98 IN | WEIGHT: 156.97 LBS | OXYGEN SATURATION: 100 % | TEMPERATURE: 98 F

## 2024-10-21 DIAGNOSIS — F20.9 SCHIZOPHRENIA, UNSPECIFIED: ICD-10-CM

## 2024-10-21 LAB
GLUCOSE BLDC GLUCOMTR-MCNC: 106 MG/DL — HIGH (ref 70–99)
GLUCOSE BLDC GLUCOMTR-MCNC: 132 MG/DL — HIGH (ref 70–99)
GLUCOSE BLDC GLUCOMTR-MCNC: 177 MG/DL — HIGH (ref 70–99)
SARS-COV-2 RNA SPEC QL NAA+PROBE: SIGNIFICANT CHANGE UP

## 2024-10-21 PROCEDURE — 99239 HOSP IP/OBS DSCHRG MGMT >30: CPT

## 2024-10-21 PROCEDURE — 99232 SBSQ HOSP IP/OBS MODERATE 35: CPT

## 2024-10-21 RX ORDER — DM/PSEUDOEPHED/ACETAMINOPHEN 10-30-250
15 CAPSULE ORAL ONCE
Refills: 0 | Status: DISCONTINUED | OUTPATIENT
Start: 2024-10-21 | End: 2025-01-29

## 2024-10-21 RX ORDER — OLANZAPINE 10 MG/1
2.5 TABLET, FILM COATED ORAL
Refills: 0 | Status: DISCONTINUED | OUTPATIENT
Start: 2024-10-21 | End: 2024-10-28

## 2024-10-21 RX ORDER — MAGNESIUM, ALUMINUM HYDROXIDE 200-225/5
30 SUSPENSION, ORAL (FINAL DOSE FORM) ORAL EVERY 4 HOURS
Refills: 0 | Status: DISCONTINUED | OUTPATIENT
Start: 2024-10-21 | End: 2025-01-29

## 2024-10-21 RX ORDER — FLUTICASONE PROPIONATE 50 UG/1
1 SPRAY, METERED NASAL
Refills: 0 | DISCHARGE

## 2024-10-21 RX ORDER — INSULIN LISPRO 100/ML
VIAL (ML) SUBCUTANEOUS AT BEDTIME
Refills: 0 | Status: DISCONTINUED | OUTPATIENT
Start: 2024-10-21 | End: 2024-11-07

## 2024-10-21 RX ORDER — METOPROLOL SUCCINATE 25 MG
50 TABLET, EXTENDED RELEASE 24 HR ORAL DAILY
Refills: 0 | Status: DISCONTINUED | OUTPATIENT
Start: 2024-10-21 | End: 2025-01-29

## 2024-10-21 RX ORDER — ACETAMINOPHEN, DIPHENHYDRAMINE HCL, PHENYLEPHRINE HCL 325; 25; 5 MG/1; MG/1; MG/1
6 TABLET ORAL AT BEDTIME
Refills: 0 | Status: DISCONTINUED | OUTPATIENT
Start: 2024-10-21 | End: 2024-12-11

## 2024-10-21 RX ORDER — MECOBAL/LEVOMEFOLAT CA/B6 PHOS 2-3-35 MG
1 TABLET ORAL DAILY
Refills: 0 | Status: DISCONTINUED | OUTPATIENT
Start: 2024-10-21 | End: 2024-10-28

## 2024-10-21 RX ORDER — QUETIAPINE FUMARATE 300 MG/1
75 TABLET ORAL
Refills: 0 | Status: DISCONTINUED | OUTPATIENT
Start: 2024-10-21 | End: 2024-10-28

## 2024-10-21 RX ORDER — LEVOTHYROXINE SODIUM 25 UG/1
75 TABLET ORAL DAILY
Refills: 0 | Status: DISCONTINUED | OUTPATIENT
Start: 2024-10-21 | End: 2025-01-29

## 2024-10-21 RX ORDER — OLANZAPINE 10 MG/1
3.75 TABLET, FILM COATED ORAL ONCE
Refills: 0 | Status: DISCONTINUED | OUTPATIENT
Start: 2024-10-21 | End: 2024-10-28

## 2024-10-21 RX ORDER — ALBUTEROL 90 MCG
2 AEROSOL REFILL (GRAM) INHALATION EVERY 6 HOURS
Refills: 0 | Status: DISCONTINUED | OUTPATIENT
Start: 2024-10-21 | End: 2025-01-29

## 2024-10-21 RX ORDER — GLUCAGON 3 MG/1
1 POWDER NASAL ONCE
Refills: 0 | Status: DISCONTINUED | OUTPATIENT
Start: 2024-10-21 | End: 2025-01-29

## 2024-10-21 RX ORDER — ACETAMINOPHEN 160 MG/5ML
650 SUSPENSION ORAL EVERY 6 HOURS
Refills: 0 | Status: DISCONTINUED | OUTPATIENT
Start: 2024-10-21 | End: 2025-01-29

## 2024-10-21 RX ORDER — INSULIN LISPRO 100/ML
VIAL (ML) SUBCUTANEOUS
Refills: 0 | Status: DISCONTINUED | OUTPATIENT
Start: 2024-10-21 | End: 2024-11-07

## 2024-10-21 RX ORDER — DIVALPROEX SODIUM 500 MG
1000 TABLET, DELAYED RELEASE (ENTERIC COATED) ORAL
Refills: 0 | Status: DISCONTINUED | OUTPATIENT
Start: 2024-10-21 | End: 2025-01-29

## 2024-10-21 RX ORDER — SENNOSIDES 8.6 MG
2 TABLET ORAL AT BEDTIME
Refills: 0 | Status: DISCONTINUED | OUTPATIENT
Start: 2024-10-21 | End: 2025-01-29

## 2024-10-21 RX ADMIN — DIVALPROEX SODIUM 1000 MILLIGRAM(S): 250 TABLET, FILM COATED, EXTENDED RELEASE ORAL at 06:48

## 2024-10-21 RX ADMIN — Medication 1000 UNIT(S): at 12:29

## 2024-10-21 RX ADMIN — Medication 1000 MILLIGRAM(S): at 22:17

## 2024-10-21 RX ADMIN — Medication 1 TABLET(S): at 12:30

## 2024-10-21 RX ADMIN — ACETAMINOPHEN, DIPHENHYDRAMINE HCL, PHENYLEPHRINE HCL 6 MILLIGRAM(S): 325; 25; 5 TABLET ORAL at 22:19

## 2024-10-21 RX ADMIN — CHLORHEXIDINE GLUCONATE 1 APPLICATION(S): 40 SOLUTION TOPICAL at 12:23

## 2024-10-21 RX ADMIN — QUETIAPINE FUMARATE 75 MILLIGRAM(S): 300 TABLET ORAL at 22:19

## 2024-10-21 RX ADMIN — Medication 50 MILLIGRAM(S): at 06:48

## 2024-10-21 RX ADMIN — Medication 40 MILLIGRAM(S): at 12:30

## 2024-10-21 RX ADMIN — Medication 75 MICROGRAM(S): at 06:48

## 2024-10-21 RX ADMIN — Medication 1: at 09:08

## 2024-10-21 RX ADMIN — QUETIAPINE FUMARATE 75 MILLIGRAM(S): 200 TABLET ORAL at 12:23

## 2024-10-21 NOTE — BH CONSULTATION LIAISON PROGRESS NOTE - NSBHCHARTREVIEWVS_PSY_A_CORE FT
Vital Signs Last 24 Hrs  T(C): 36.4 (03 Oct 2024 04:00), Max: 36.6 (02 Oct 2024 23:00)  T(F): 97.6 (03 Oct 2024 04:00), Max: 97.9 (02 Oct 2024 23:00)  HR: 60 (03 Oct 2024 04:00) (60 - 72)  BP: 143/80 (03 Oct 2024 04:00) (99/76 - 143/80)  BP(mean): 80 (02 Oct 2024 17:08) (80 - 80)  RR: 18 (03 Oct 2024 04:00) (16 - 18)  SpO2: 100% (03 Oct 2024 04:00) (100% - 100%)    Parameters below as of 03 Oct 2024 04:00  Patient On (Oxygen Delivery Method): room air    
Vital Signs Last 24 Hrs  T(C): 36.2 (07 Oct 2024 09:45), Max: 36.9 (06 Oct 2024 22:30)  T(F): 97.2 (07 Oct 2024 09:45), Max: 98.5 (06 Oct 2024 22:30)  HR: 66 (07 Oct 2024 09:45) (57 - 70)  BP: 122/64 (07 Oct 2024 09:45) (122/64 - 140/86)  BP(mean): --  RR: 18 (07 Oct 2024 09:45) (17 - 18)  SpO2: 100% (07 Oct 2024 09:45) (99% - 100%)    Parameters below as of 07 Oct 2024 09:45  Patient On (Oxygen Delivery Method): room air    
Vital Signs Last 24 Hrs  T(C): 36.7 (10 Oct 2024 22:50), Max: 36.9 (10 Oct 2024 17:59)  T(F): 98 (10 Oct 2024 22:50), Max: 98.4 (10 Oct 2024 17:59)  HR: 81 (11 Oct 2024 08:00) (81 - 89)  BP: 135/76 (11 Oct 2024 08:00) (135/76 - 143/87)  BP(mean): --  RR: 18 (11 Oct 2024 08:00) (18 - 18)  SpO2: 100% (11 Oct 2024 08:00) (97% - 100%)    Parameters below as of 11 Oct 2024 08:00  Patient On (Oxygen Delivery Method): room air    
Vital Signs Last 24 Hrs  T(C): 36.4 (09 Oct 2024 10:58), Max: 36.6 (08 Oct 2024 22:30)  T(F): 97.6 (09 Oct 2024 10:58), Max: 97.8 (08 Oct 2024 22:30)  HR: 78 (09 Oct 2024 10:58) (68 - 78)  BP: 133/86 (09 Oct 2024 10:58) (133/86 - 146/84)  BP(mean): --  RR: 18 (09 Oct 2024 10:58) (18 - 18)  SpO2: 99% (09 Oct 2024 10:58) (99% - 100%)    Parameters below as of 09 Oct 2024 10:58  Patient On (Oxygen Delivery Method): room air    
Vital Signs Last 24 Hrs  T(C): 36.6 (01 Oct 2024 11:55), Max: 36.8 (30 Sep 2024 15:55)  T(F): 97.9 (01 Oct 2024 11:55), Max: 98.2 (30 Sep 2024 15:55)  HR: 66 (01 Oct 2024 11:55) (65 - 89)  BP: 136/83 (01 Oct 2024 11:55) (128/76 - 142/90)  BP(mean): --  RR: 17 (01 Oct 2024 11:55) (17 - 18)  SpO2: 100% (01 Oct 2024 11:55) (97% - 100%)    Parameters below as of 01 Oct 2024 11:55  Patient On (Oxygen Delivery Method): room air    
Vital Signs Last 24 Hrs  T(C): 36.4 (02 Oct 2024 08:56), Max: 36.4 (01 Oct 2024 15:55)  T(F): 97.6 (02 Oct 2024 08:56), Max: 97.6 (01 Oct 2024 15:55)  HR: 58 (02 Oct 2024 08:56) (57 - 70)  BP: 139/62 (02 Oct 2024 08:56) (139/62 - 148/89)  BP(mean): 79 (02 Oct 2024 08:56) (79 - 79)  RR: 16 (02 Oct 2024 08:56) (16 - 18)  SpO2: 99% (02 Oct 2024 08:56) (99% - 100%)    Parameters below as of 02 Oct 2024 08:56  Patient On (Oxygen Delivery Method): room air    
Vital Signs Last 24 Hrs  T(C): 36.3 (17 Oct 2024 06:05), Max: 36.3 (16 Oct 2024 21:20)  T(F): 97.4 (17 Oct 2024 06:05), Max: 97.4 (16 Oct 2024 21:20)  HR: 86 (17 Oct 2024 06:05) (86 - 87)  BP: 137/84 (17 Oct 2024 06:05) (137/84 - 152/90)  BP(mean): --  RR: 18 (17 Oct 2024 06:05) (18 - 18)  SpO2: 100% (17 Oct 2024 06:05) (100% - 100%)    Parameters below as of 17 Oct 2024 06:05  Patient On (Oxygen Delivery Method): room air    
Vital Signs Last 24 Hrs  T(C): 36.4 (18 Oct 2024 06:25), Max: 36.4 (17 Oct 2024 21:28)  T(F): 97.6 (18 Oct 2024 06:25), Max: 97.6 (17 Oct 2024 21:28)  HR: 72 (18 Oct 2024 06:25) (72 - 87)  BP: 142/89 (18 Oct 2024 06:25) (142/89 - 151/82)  BP(mean): --  RR: 18 (18 Oct 2024 06:25) (18 - 18)  SpO2: 100% (18 Oct 2024 06:25) (100% - 100%)    Parameters below as of 18 Oct 2024 06:25  Patient On (Oxygen Delivery Method): room air    
Vital Signs Last 24 Hrs  T(C): 36.6 (14 Oct 2024 12:00), Max: 36.7 (13 Oct 2024 20:50)  T(F): 97.8 (14 Oct 2024 12:00), Max: 98 (13 Oct 2024 20:50)  HR: 79 (14 Oct 2024 12:00) (79 - 81)  BP: 151/75 (14 Oct 2024 12:00) (110/70 - 151/75)  BP(mean): --  RR: 16 (14 Oct 2024 12:00) (16 - 18)  SpO2: 99% (14 Oct 2024 12:00) (98% - 99%)    Parameters below as of 14 Oct 2024 12:00  Patient On (Oxygen Delivery Method): room air    
Vital Signs Last 24 Hrs  T(C): 36.3 (04 Oct 2024 10:43), Max: 36.7 (03 Oct 2024 22:00)  T(F): 97.4 (04 Oct 2024 10:43), Max: 98.1 (03 Oct 2024 22:00)  HR: 69 (04 Oct 2024 10:43) (62 - 69)  BP: 139/80 (04 Oct 2024 10:43) (136/77 - 146/76)  BP(mean): --  RR: 18 (04 Oct 2024 10:43) (18 - 18)  SpO2: 100% (04 Oct 2024 10:43) (100% - 100%)    Parameters below as of 04 Oct 2024 06:40  Patient On (Oxygen Delivery Method): room air    
Vital Signs Last 24 Hrs  T(C): 36.4 (21 Oct 2024 06:40), Max: 36.7 (20 Oct 2024 23:44)  T(F): 97.6 (21 Oct 2024 06:40), Max: 98.1 (20 Oct 2024 23:44)  HR: 99 (21 Oct 2024 06:40) (99 - 108)  BP: 140/100 (21 Oct 2024 06:40) (140/100 - 146/96)  BP(mean): --  RR: 17 (21 Oct 2024 06:40) (17 - 17)  SpO2: 99% (21 Oct 2024 06:40) (99% - 99%)    Parameters below as of 21 Oct 2024 06:40  Patient On (Oxygen Delivery Method): room air

## 2024-10-21 NOTE — BH INPATIENT PSYCHIATRY ASSESSMENT NOTE - RISK ASSESSMENT
Chronic risk factors include hx of Schizophrenia, age, medical comorbidities, treatment resistant symptoms and hx of past psychiatric hospitalizations. Acute risk factors include active paranoid delusions. Imminent risk mitigated with inpatient level of care which limits access to lethal means. Protective factors include supportive family, no active suicidal ideation.

## 2024-10-21 NOTE — BH CONSULTATION LIAISON PROGRESS NOTE - NSBHASSESSMENTFT_PSY_ALL_CORE
Ms Jorge Alberto is a 72 yo F w history of SCZ, Parkinson's dz, hypothyroidism, and multiple psych hospitalizations who presents for AMS. Pt transferred from [facility], where she was found to have fallen and was covered in urine. Pt has been hospitalized in [facility] with occasional stays at [facility] for medical concerns since the start of this year for psychosis. Per [facility] notes, pt appears to be awaiting transfer to [facility] hospital. Pt has been delusional at [facility] with recent delusions surrounding pregnancy. Current medications are Depakote 750 mg bid, Seroquel 50 mg bid, and Prolixin Dec 12.5 mg IM q2w (last dose 9/24). Psychiatry was consulted for continued psychiatric care.    9/30: Pt continues to be paranoid but no agitation noted. EEG done; no seizures recorded. Pending ongoing medical work up.  10/1: Pt continues to be paranoid; no prns required. Ongoing medial work up; once done plan to return to [facility].  10/2: No change today. Once optimized, will return to [facility]. 2PC completed.  10/3: No changes. Continue meds as is.  10/4: Agitation yesterday requiring prn and restraints. Okay today but continues to be paranoid. Pending transfer to [facility]. Low VPA level but missed dose yesterday; not accurate level.  10/7: Agitated over weekend, requiring restraints and prns. Increase Seroquel for paranoia/agitation. Once pt has been taking consistent dose of Depakote, repeat level. Pending [facility] transfer.  10/9-- paranoid, agitated easily  10/11: Continued paranoia, irritability, delusions. Pending [facility] transfer. 2PC updated and in the chart.  10/17---disorganized, hostile, with delusions, AH  10/18: Irritable, paranoid. No prns required. Pending [facility].  10/21: less irritable and paranoid, remains disorganized in behavior/thought process. No prns required. Will be admitted to [facility] 2S today on 9.27, pending update to legals.    Recs:  - Continue Depakote 1000mg bid PO. Will continue to encourage compliance. Consider obtaining level (last lvl on 10/4 17.3 after missing dose)  - Continue Seroquel 75 mg bid po  	- hold if QTC > 500  - Continue Prolixin Dec 12.5 mg IM  q2w ***last dose 10/9, next due 10/23  - consider Zyprexa 1.25 mg po/IM q6h prn for agitation if warranted; may give additional Zyprexa 1.25 mg if first dose is ineffective. hold for QTC > 500  - Continue to monitor VPA level   - Obs: started on 1:1 by team, recommend to  continue CO 1:1 for impulsivity/recent agitation  - Dispo: TBD,  return to [facility] once medically optimized; 2PC completed and in the chart 10/11/24   Ms Jorge Alberto is a 72 yo F w history of SCZ, Parkinson's dz, hypothyroidism, and multiple psych hospitalizations who presents for AMS. Pt transferred from [facility], where she was found to have fallen and was covered in urine. Pt has been hospitalized in [facility] with occasional stays at [facility] for medical concerns since the start of this year for psychosis. Per [facility] notes, pt appears to be awaiting transfer to [facility] hospital. Pt has been delusional at [facility] with recent delusions surrounding pregnancy. Current medications are Depakote 750 mg bid, Seroquel 50 mg bid, and Prolixin Dec 12.5 mg IM q2w (last dose 9/24). Psychiatry was consulted for continued psychiatric care.    9/30: Pt continues to be paranoid but no agitation noted. EEG done; no seizures recorded. Pending ongoing medical work up.  10/1: Pt continues to be paranoid; no prns required. Ongoing medial work up; once done plan to return to [facility].  10/2: No change today. Once optimized, will return to [facility]. 2PC completed.  10/3: No changes. Continue meds as is.  10/4: Agitation yesterday requiring prn and restraints. Okay today but continues to be paranoid. Pending transfer to [facility]. Low VPA level but missed dose yesterday; not accurate level.  10/7: Agitated over weekend, requiring restraints and prns. Increase Seroquel for paranoia/agitation. Once pt has been taking consistent dose of Depakote, repeat level. Pending [facility] transfer.  10/9-- paranoid, agitated easily  10/11: Continued paranoia, irritability, delusions. Pending [facility] transfer. 2PC updated and in the chart.  10/17---disorganized, hostile, with delusions, AH  10/18: Irritable, paranoid. No prns required. Pending [facility].  10/21: Somewhat less irritable and paranoid, remains disorganized in behavior/thought process. No prns required. Will be admitted to [facility] 2S today on 9.27, pending update to legals.    Recs:  - Continue Depakote 1000mg bid PO. Will continue to encourage compliance. Please obtain VPA level (last lvl on 10/4 17.3 after missing dose)  - Continue Seroquel 75 mg bid po  	- hold if QTC > 500  - Continue Prolixin Dec 12.5 mg IM  q2w ***last dose 10/9, next due 10/23  - consider Zyprexa 1.25 mg po/IM q6h prn for agitation if warranted; may give additional Zyprexa 1.25 mg if first dose is ineffective. hold for QTC > 500  - Continue to monitor VPA level   - Obs: started on 1:1 by team, recommend to  continue CO 1:1 for impulsivity/recent agitation  - Dispo:  return to [facility] once medically optimized; 2PC completed and in the chart 10/21/24

## 2024-10-21 NOTE — DISCHARGE NOTE NURSING/CASE MANAGEMENT/SOCIAL WORK - FINANCIAL ASSISTANCE
[facility] provides services at a reduced cost to those who are determined to be eligible through [facility]’s financial assistance program. Information regarding [facility]’s financial assistance program can be found by going to [email or web address] or by calling [phone].

## 2024-10-21 NOTE — BH CONSULTATION LIAISON PROGRESS NOTE - NSBHATTESTCOMMENTATTENDFT_PSY_A_CORE
Handoff received from Dr. Johnston, chart reviewed, pt. seen/evaluated with trainee, I agree with above assessment/plan. Patient grossly oriented, irritable, tangential/disorganized, but not agitated, remains paranoid/delusional,  no si or hi. Plan as above, pt. will need an inpatient psychiatric admission for further stabilization/treatment. Will follow  Handoff received from Dr. Johnston, chart reviewed, pt. seen/evaluated with trainee, I agree with above assessment/plan. Patient grossly oriented, irritable, tangential/disorganized, but not agitated, remains paranoid/delusional,  no si or hi. Plan as above, pt. will need an inpatient psychiatric admission for further stabilization/treatment. Care coordinated with TRISH Nowak.

## 2024-10-21 NOTE — BH PATIENT PROFILE - NSPROMEDSBROUGHTTOHOSP_GEN_A_NUR
Left message for patient to call back,    Need to schedule pre op and also pt did not do ua that was ordered at 9/14/20 visit.     no

## 2024-10-21 NOTE — BH CONSULTATION LIAISON PROGRESS NOTE - NSBHMSEGAIT_PSY_A_CORE
Other
Unable to assess
Normal gait / station
Unable to assess

## 2024-10-21 NOTE — DISCHARGE NOTE NURSING/CASE MANAGEMENT/SOCIAL WORK - NSDCFUADDAPPT_GEN_ALL_CORE_FT
Follow up with Psychiatrist at [facility].  Follow up with Neurologist once discharged from [facility].  Thyroid Stimulating Hormone level high, free thyroxine normal - Repeat Thyroid function tests in 4 weeks.

## 2024-10-21 NOTE — BH CONSULTATION LIAISON PROGRESS NOTE - CURRENT MEDICATION
MEDICATIONS  (STANDING):  chlorhexidine 2% Cloths 1 Application(s) Topical <User Schedule>  cholecalciferol 1000 Unit(s) Oral daily  dextrose 5%. 1000 milliLiter(s) (100 mL/Hr) IV Continuous <Continuous>  dextrose 5%. 1000 milliLiter(s) (50 mL/Hr) IV Continuous <Continuous>  dextrose 50% Injectable 25 Gram(s) IV Push once  dextrose 50% Injectable 12.5 Gram(s) IV Push once  dextrose 50% Injectable 25 Gram(s) IV Push once  divalproex Sprinkle 1000 milliGRAM(s) Oral two times a day  enoxaparin Injectable 40 milliGRAM(s) SubCutaneous every 24 hours  fluticasone propionate 50 MICROgram(s)/spray Nasal Spray 1 Spray(s) Both Nostrils two times a day  glucagon  Injectable 1 milliGRAM(s) IntraMuscular once  insulin lispro (ADMELOG) corrective regimen sliding scale   SubCutaneous at bedtime  insulin lispro (ADMELOG) corrective regimen sliding scale   SubCutaneous three times a day before meals  levothyroxine 75 MICROGram(s) Oral daily  levothyroxine      melatonin 6 milliGRAM(s) Oral at bedtime  metoprolol succinate ER 50 milliGRAM(s) Oral daily  multivitamin 1 Tablet(s) Oral daily  QUEtiapine 75 milliGRAM(s) Oral daily  QUEtiapine 75 milliGRAM(s) Oral at bedtime  senna 2 Tablet(s) Oral at bedtime    MEDICATIONS  (PRN):  acetaminophen     Tablet .. 650 milliGRAM(s) Oral every 6 hours PRN Temp greater or equal to 38C (100.4F), Mild Pain (1 - 3)  albuterol    90 MICROgram(s) HFA Inhaler 2 Puff(s) Inhalation every 6 hours PRN Shortness of Breath and/or Wheezing  aluminum hydroxide/magnesium hydroxide/simethicone Suspension 30 milliLiter(s) Oral every 4 hours PRN Dyspepsia  bisacodyl 5 milliGRAM(s) Oral every 12 hours PRN Constipation  dextrose Oral Gel 15 Gram(s) Oral once PRN Blood Glucose LESS THAN 70 milliGRAM(s)/deciliter  OLANZapine Injectable 1.25 milliGRAM(s) IntraMuscular every 6 hours PRN agitation  simethicone 80 milliGRAM(s) Chew three times a day PRN Gas  
MEDICATIONS  (STANDING):  chlorhexidine 2% Cloths 1 Application(s) Topical <User Schedule>  cholecalciferol 1000 Unit(s) Oral daily  dextrose 5%. 1000 milliLiter(s) (100 mL/Hr) IV Continuous <Continuous>  dextrose 5%. 1000 milliLiter(s) (50 mL/Hr) IV Continuous <Continuous>  dextrose 50% Injectable 25 Gram(s) IV Push once  dextrose 50% Injectable 12.5 Gram(s) IV Push once  dextrose 50% Injectable 25 Gram(s) IV Push once  divalproex Sprinkle 1000 milliGRAM(s) Oral two times a day  enoxaparin Injectable 40 milliGRAM(s) SubCutaneous every 24 hours  fluticasone propionate 50 MICROgram(s)/spray Nasal Spray 1 Spray(s) Both Nostrils two times a day  glucagon  Injectable 1 milliGRAM(s) IntraMuscular once  insulin lispro (ADMELOG) corrective regimen sliding scale   SubCutaneous three times a day before meals  insulin lispro (ADMELOG) corrective regimen sliding scale   SubCutaneous at bedtime  levothyroxine 75 MICROGram(s) Oral daily  levothyroxine      melatonin 6 milliGRAM(s) Oral at bedtime  metoprolol succinate ER 50 milliGRAM(s) Oral daily  multivitamin 1 Tablet(s) Oral daily  mupirocin 2% Nasal 1 Application(s) Both Nostrils two times a day  QUEtiapine 75 milliGRAM(s) Oral at bedtime  QUEtiapine 75 milliGRAM(s) Oral daily  senna 2 Tablet(s) Oral at bedtime    MEDICATIONS  (PRN):  acetaminophen     Tablet .. 650 milliGRAM(s) Oral every 6 hours PRN Temp greater or equal to 38C (100.4F), Mild Pain (1 - 3)  albuterol    90 MICROgram(s) HFA Inhaler 2 Puff(s) Inhalation every 6 hours PRN Shortness of Breath and/or Wheezing  aluminum hydroxide/magnesium hydroxide/simethicone Suspension 30 milliLiter(s) Oral every 4 hours PRN Dyspepsia  bisacodyl 5 milliGRAM(s) Oral every 12 hours PRN Constipation  dextrose Oral Gel 15 Gram(s) Oral once PRN Blood Glucose LESS THAN 70 milliGRAM(s)/deciliter  OLANZapine Injectable 3.75 milliGRAM(s) IntraMuscular every 6 hours PRN agitation  simethicone 80 milliGRAM(s) Chew three times a day PRN Gas  
MEDICATIONS  (STANDING):  chlorhexidine 2% Cloths 1 Application(s) Topical <User Schedule>  cholecalciferol 1000 Unit(s) Oral daily  dextrose 5%. 1000 milliLiter(s) (50 mL/Hr) IV Continuous <Continuous>  dextrose 5%. 1000 milliLiter(s) (100 mL/Hr) IV Continuous <Continuous>  dextrose 50% Injectable 25 Gram(s) IV Push once  dextrose 50% Injectable 12.5 Gram(s) IV Push once  dextrose 50% Injectable 25 Gram(s) IV Push once  divalproex Sprinkle 1000 milliGRAM(s) Oral two times a day  enoxaparin Injectable 40 milliGRAM(s) SubCutaneous every 24 hours  fluticasone propionate 50 MICROgram(s)/spray Nasal Spray 1 Spray(s) Both Nostrils two times a day  glucagon  Injectable 1 milliGRAM(s) IntraMuscular once  insulin lispro (ADMELOG) corrective regimen sliding scale   SubCutaneous three times a day before meals  insulin lispro (ADMELOG) corrective regimen sliding scale   SubCutaneous at bedtime  levothyroxine 75 MICROGram(s) Oral daily  levothyroxine      melatonin 6 milliGRAM(s) Oral at bedtime  metoprolol succinate ER 50 milliGRAM(s) Oral daily  multivitamin 1 Tablet(s) Oral daily  QUEtiapine 50 milliGRAM(s) Oral at bedtime  QUEtiapine 50 milliGRAM(s) Oral daily  senna 2 Tablet(s) Oral at bedtime    MEDICATIONS  (PRN):  acetaminophen     Tablet .. 650 milliGRAM(s) Oral every 6 hours PRN Temp greater or equal to 38C (100.4F), Mild Pain (1 - 3)  albuterol    90 MICROgram(s) HFA Inhaler 2 Puff(s) Inhalation every 6 hours PRN Shortness of Breath and/or Wheezing  aluminum hydroxide/magnesium hydroxide/simethicone Suspension 30 milliLiter(s) Oral every 4 hours PRN Dyspepsia  bisacodyl 5 milliGRAM(s) Oral every 12 hours PRN Constipation  dextrose Oral Gel 15 Gram(s) Oral once PRN Blood Glucose LESS THAN 70 milliGRAM(s)/deciliter  OLANZapine Injectable 1.25 milliGRAM(s) IntraMuscular every 6 hours PRN agitation  simethicone 80 milliGRAM(s) Chew three times a day PRN Gas  
MEDICATIONS  (STANDING):  chlorhexidine 2% Cloths 1 Application(s) Topical <User Schedule>  cholecalciferol 1000 Unit(s) Oral daily  dextrose 5%. 1000 milliLiter(s) (100 mL/Hr) IV Continuous <Continuous>  dextrose 5%. 1000 milliLiter(s) (50 mL/Hr) IV Continuous <Continuous>  dextrose 50% Injectable 25 Gram(s) IV Push once  dextrose 50% Injectable 12.5 Gram(s) IV Push once  dextrose 50% Injectable 25 Gram(s) IV Push once  divalproex Sprinkle 1000 milliGRAM(s) Oral two times a day  enoxaparin Injectable 40 milliGRAM(s) SubCutaneous every 24 hours  fluticasone propionate 50 MICROgram(s)/spray Nasal Spray 1 Spray(s) Both Nostrils two times a day  glucagon  Injectable 1 milliGRAM(s) IntraMuscular once  insulin lispro (ADMELOG) corrective regimen sliding scale   SubCutaneous three times a day before meals  insulin lispro (ADMELOG) corrective regimen sliding scale   SubCutaneous at bedtime  levothyroxine 75 MICROGram(s) Oral daily  levothyroxine      melatonin 6 milliGRAM(s) Oral at bedtime  metoprolol succinate ER 50 milliGRAM(s) Oral daily  multivitamin 1 Tablet(s) Oral daily  QUEtiapine 75 milliGRAM(s) Oral daily  QUEtiapine 75 milliGRAM(s) Oral at bedtime  senna 2 Tablet(s) Oral at bedtime    MEDICATIONS  (PRN):  acetaminophen     Tablet .. 650 milliGRAM(s) Oral every 6 hours PRN Temp greater or equal to 38C (100.4F), Mild Pain (1 - 3)  albuterol    90 MICROgram(s) HFA Inhaler 2 Puff(s) Inhalation every 6 hours PRN Shortness of Breath and/or Wheezing  aluminum hydroxide/magnesium hydroxide/simethicone Suspension 30 milliLiter(s) Oral every 4 hours PRN Dyspepsia  bisacodyl 5 milliGRAM(s) Oral every 12 hours PRN Constipation  dextrose Oral Gel 15 Gram(s) Oral once PRN Blood Glucose LESS THAN 70 milliGRAM(s)/deciliter  OLANZapine Injectable 1.25 milliGRAM(s) IntraMuscular every 6 hours PRN agitation  simethicone 80 milliGRAM(s) Chew three times a day PRN Gas  
MEDICATIONS  (STANDING):  chlorhexidine 2% Cloths 1 Application(s) Topical <User Schedule>  cholecalciferol 1000 Unit(s) Oral daily  dextrose 5%. 1000 milliLiter(s) (100 mL/Hr) IV Continuous <Continuous>  dextrose 5%. 1000 milliLiter(s) (50 mL/Hr) IV Continuous <Continuous>  dextrose 50% Injectable 25 Gram(s) IV Push once  dextrose 50% Injectable 12.5 Gram(s) IV Push once  dextrose 50% Injectable 25 Gram(s) IV Push once  divalproex Sprinkle 1000 milliGRAM(s) Oral two times a day  enoxaparin Injectable 40 milliGRAM(s) SubCutaneous every 24 hours  fluticasone propionate 50 MICROgram(s)/spray Nasal Spray 1 Spray(s) Both Nostrils two times a day  glucagon  Injectable 1 milliGRAM(s) IntraMuscular once  insulin lispro (ADMELOG) corrective regimen sliding scale   SubCutaneous at bedtime  insulin lispro (ADMELOG) corrective regimen sliding scale   SubCutaneous three times a day before meals  levothyroxine      levothyroxine 75 MICROGram(s) Oral daily  melatonin 6 milliGRAM(s) Oral at bedtime  metoprolol succinate ER 50 milliGRAM(s) Oral daily  multivitamin 1 Tablet(s) Oral daily  OLANZapine Injectable 2.5 milliGRAM(s) IntraMuscular once  QUEtiapine 75 milliGRAM(s) Oral daily  QUEtiapine 75 milliGRAM(s) Oral at bedtime  senna 2 Tablet(s) Oral at bedtime    MEDICATIONS  (PRN):  acetaminophen     Tablet .. 650 milliGRAM(s) Oral every 6 hours PRN Temp greater or equal to 38C (100.4F), Mild Pain (1 - 3)  albuterol    90 MICROgram(s) HFA Inhaler 2 Puff(s) Inhalation every 6 hours PRN Shortness of Breath and/or Wheezing  aluminum hydroxide/magnesium hydroxide/simethicone Suspension 30 milliLiter(s) Oral every 4 hours PRN Dyspepsia  bisacodyl 5 milliGRAM(s) Oral every 12 hours PRN Constipation  dextrose Oral Gel 15 Gram(s) Oral once PRN Blood Glucose LESS THAN 70 milliGRAM(s)/deciliter  OLANZapine Injectable 2.5 milliGRAM(s) IntraMuscular every 6 hours PRN agitation  simethicone 80 milliGRAM(s) Chew three times a day PRN Gas  
MEDICATIONS  (STANDING):  chlorhexidine 2% Cloths 1 Application(s) Topical <User Schedule>  cholecalciferol 1000 Unit(s) Oral daily  dextrose 5%. 1000 milliLiter(s) (100 mL/Hr) IV Continuous <Continuous>  dextrose 5%. 1000 milliLiter(s) (50 mL/Hr) IV Continuous <Continuous>  dextrose 50% Injectable 25 Gram(s) IV Push once  dextrose 50% Injectable 12.5 Gram(s) IV Push once  dextrose 50% Injectable 25 Gram(s) IV Push once  divalproex Sprinkle 1000 milliGRAM(s) Oral two times a day  enoxaparin Injectable 40 milliGRAM(s) SubCutaneous every 24 hours  fluticasone propionate 50 MICROgram(s)/spray Nasal Spray 1 Spray(s) Both Nostrils two times a day  glucagon  Injectable 1 milliGRAM(s) IntraMuscular once  insulin lispro (ADMELOG) corrective regimen sliding scale   SubCutaneous three times a day before meals  insulin lispro (ADMELOG) corrective regimen sliding scale   SubCutaneous at bedtime  levothyroxine 75 MICROGram(s) Oral daily  levothyroxine      melatonin 6 milliGRAM(s) Oral at bedtime  metoprolol succinate ER 50 milliGRAM(s) Oral daily  multivitamin 1 Tablet(s) Oral daily  mupirocin 2% Nasal 1 Application(s) Both Nostrils two times a day  QUEtiapine 75 milliGRAM(s) Oral daily  QUEtiapine 75 milliGRAM(s) Oral at bedtime  senna 2 Tablet(s) Oral at bedtime    MEDICATIONS  (PRN):  acetaminophen     Tablet .. 650 milliGRAM(s) Oral every 6 hours PRN Temp greater or equal to 38C (100.4F), Mild Pain (1 - 3)  albuterol    90 MICROgram(s) HFA Inhaler 2 Puff(s) Inhalation every 6 hours PRN Shortness of Breath and/or Wheezing  aluminum hydroxide/magnesium hydroxide/simethicone Suspension 30 milliLiter(s) Oral every 4 hours PRN Dyspepsia  bisacodyl 5 milliGRAM(s) Oral every 12 hours PRN Constipation  dextrose Oral Gel 15 Gram(s) Oral once PRN Blood Glucose LESS THAN 70 milliGRAM(s)/deciliter  OLANZapine Injectable 3.75 milliGRAM(s) IntraMuscular every 6 hours PRN agitation  simethicone 80 milliGRAM(s) Chew three times a day PRN Gas  
MEDICATIONS  (STANDING):  chlorhexidine 2% Cloths 1 Application(s) Topical <User Schedule>  cholecalciferol 1000 Unit(s) Oral daily  dextrose 5%. 1000 milliLiter(s) (50 mL/Hr) IV Continuous <Continuous>  dextrose 5%. 1000 milliLiter(s) (100 mL/Hr) IV Continuous <Continuous>  dextrose 50% Injectable 25 Gram(s) IV Push once  dextrose 50% Injectable 12.5 Gram(s) IV Push once  dextrose 50% Injectable 25 Gram(s) IV Push once  divalproex Sprinkle 1000 milliGRAM(s) Oral two times a day  enoxaparin Injectable 40 milliGRAM(s) SubCutaneous every 24 hours  fluticasone propionate 50 MICROgram(s)/spray Nasal Spray 1 Spray(s) Both Nostrils two times a day  glucagon  Injectable 1 milliGRAM(s) IntraMuscular once  insulin lispro (ADMELOG) corrective regimen sliding scale   SubCutaneous three times a day before meals  insulin lispro (ADMELOG) corrective regimen sliding scale   SubCutaneous at bedtime  levothyroxine 75 MICROGram(s) Oral daily  levothyroxine      melatonin 6 milliGRAM(s) Oral at bedtime  metoprolol succinate ER 50 milliGRAM(s) Oral daily  multivitamin 1 Tablet(s) Oral daily  QUEtiapine 50 milliGRAM(s) Oral daily  QUEtiapine 50 milliGRAM(s) Oral at bedtime  senna 2 Tablet(s) Oral at bedtime    MEDICATIONS  (PRN):  acetaminophen     Tablet .. 650 milliGRAM(s) Oral every 6 hours PRN Temp greater or equal to 38C (100.4F), Mild Pain (1 - 3)  albuterol    90 MICROgram(s) HFA Inhaler 2 Puff(s) Inhalation every 6 hours PRN Shortness of Breath and/or Wheezing  aluminum hydroxide/magnesium hydroxide/simethicone Suspension 30 milliLiter(s) Oral every 4 hours PRN Dyspepsia  bisacodyl 5 milliGRAM(s) Oral every 12 hours PRN Constipation  dextrose Oral Gel 15 Gram(s) Oral once PRN Blood Glucose LESS THAN 70 milliGRAM(s)/deciliter  OLANZapine Injectable 1.25 milliGRAM(s) IntraMuscular every 6 hours PRN agitation  simethicone 80 milliGRAM(s) Chew three times a day PRN Gas  
MEDICATIONS  (STANDING):  chlorhexidine 2% Cloths 1 Application(s) Topical <User Schedule>  cholecalciferol 1000 Unit(s) Oral daily  dextrose 5%. 1000 milliLiter(s) (50 mL/Hr) IV Continuous <Continuous>  dextrose 5%. 1000 milliLiter(s) (100 mL/Hr) IV Continuous <Continuous>  dextrose 50% Injectable 25 Gram(s) IV Push once  dextrose 50% Injectable 12.5 Gram(s) IV Push once  dextrose 50% Injectable 25 Gram(s) IV Push once  divalproex Sprinkle 750 milliGRAM(s) Oral two times a day  enoxaparin Injectable 40 milliGRAM(s) SubCutaneous every 24 hours  fluticasone propionate 50 MICROgram(s)/spray Nasal Spray 1 Spray(s) Both Nostrils two times a day  glucagon  Injectable 1 milliGRAM(s) IntraMuscular once  insulin lispro (ADMELOG) corrective regimen sliding scale   SubCutaneous three times a day before meals  insulin lispro (ADMELOG) corrective regimen sliding scale   SubCutaneous at bedtime  lactulose Syrup 10 Gram(s) Oral daily  levothyroxine 75 MICROGram(s) Oral daily  levothyroxine      melatonin 6 milliGRAM(s) Oral at bedtime  metoprolol succinate ER 50 milliGRAM(s) Oral daily  multivitamin 1 Tablet(s) Oral daily  QUEtiapine 50 milliGRAM(s) Oral at bedtime  QUEtiapine 50 milliGRAM(s) Oral daily  senna 2 Tablet(s) Oral at bedtime    MEDICATIONS  (PRN):  acetaminophen     Tablet .. 650 milliGRAM(s) Oral every 6 hours PRN Temp greater or equal to 38C (100.4F), Mild Pain (1 - 3)  albuterol    90 MICROgram(s) HFA Inhaler 2 Puff(s) Inhalation every 6 hours PRN Shortness of Breath and/or Wheezing  aluminum hydroxide/magnesium hydroxide/simethicone Suspension 30 milliLiter(s) Oral every 4 hours PRN Dyspepsia  bisacodyl 5 milliGRAM(s) Oral every 12 hours PRN Constipation  dextrose Oral Gel 15 Gram(s) Oral once PRN Blood Glucose LESS THAN 70 milliGRAM(s)/deciliter  simethicone 80 milliGRAM(s) Chew three times a day PRN Gas  
MEDICATIONS  (STANDING):  chlorhexidine 2% Cloths 1 Application(s) Topical <User Schedule>  cholecalciferol 1000 Unit(s) Oral daily  dextrose 5%. 1000 milliLiter(s) (100 mL/Hr) IV Continuous <Continuous>  dextrose 5%. 1000 milliLiter(s) (50 mL/Hr) IV Continuous <Continuous>  dextrose 50% Injectable 25 Gram(s) IV Push once  dextrose 50% Injectable 12.5 Gram(s) IV Push once  dextrose 50% Injectable 25 Gram(s) IV Push once  divalproex Sprinkle 1000 milliGRAM(s) Oral two times a day  enoxaparin Injectable 40 milliGRAM(s) SubCutaneous every 24 hours  fluticasone propionate 50 MICROgram(s)/spray Nasal Spray 1 Spray(s) Both Nostrils two times a day  glucagon  Injectable 1 milliGRAM(s) IntraMuscular once  insulin lispro (ADMELOG) corrective regimen sliding scale   SubCutaneous three times a day before meals  insulin lispro (ADMELOG) corrective regimen sliding scale   SubCutaneous at bedtime  levothyroxine 75 MICROGram(s) Oral daily  levothyroxine      melatonin 6 milliGRAM(s) Oral at bedtime  metoprolol succinate ER 50 milliGRAM(s) Oral daily  multivitamin 1 Tablet(s) Oral daily  QUEtiapine 50 milliGRAM(s) Oral at bedtime  QUEtiapine 50 milliGRAM(s) Oral daily  senna 2 Tablet(s) Oral at bedtime    MEDICATIONS  (PRN):  acetaminophen     Tablet .. 650 milliGRAM(s) Oral every 6 hours PRN Temp greater or equal to 38C (100.4F), Mild Pain (1 - 3)  albuterol    90 MICROgram(s) HFA Inhaler 2 Puff(s) Inhalation every 6 hours PRN Shortness of Breath and/or Wheezing  aluminum hydroxide/magnesium hydroxide/simethicone Suspension 30 milliLiter(s) Oral every 4 hours PRN Dyspepsia  bisacodyl 5 milliGRAM(s) Oral every 12 hours PRN Constipation  dextrose Oral Gel 15 Gram(s) Oral once PRN Blood Glucose LESS THAN 70 milliGRAM(s)/deciliter  simethicone 80 milliGRAM(s) Chew three times a day PRN Gas  
MEDICATIONS  (STANDING):  chlorhexidine 2% Cloths 1 Application(s) Topical <User Schedule>  cholecalciferol 1000 Unit(s) Oral daily  dextrose 5%. 1000 milliLiter(s) (100 mL/Hr) IV Continuous <Continuous>  dextrose 5%. 1000 milliLiter(s) (50 mL/Hr) IV Continuous <Continuous>  dextrose 50% Injectable 25 Gram(s) IV Push once  dextrose 50% Injectable 12.5 Gram(s) IV Push once  dextrose 50% Injectable 25 Gram(s) IV Push once  divalproex Sprinkle 1000 milliGRAM(s) Oral two times a day  enoxaparin Injectable 40 milliGRAM(s) SubCutaneous every 24 hours  fluticasone propionate 50 MICROgram(s)/spray Nasal Spray 1 Spray(s) Both Nostrils two times a day  glucagon  Injectable 1 milliGRAM(s) IntraMuscular once  insulin lispro (ADMELOG) corrective regimen sliding scale   SubCutaneous three times a day before meals  insulin lispro (ADMELOG) corrective regimen sliding scale   SubCutaneous at bedtime  levothyroxine 75 MICROGram(s) Oral daily  levothyroxine      melatonin 6 milliGRAM(s) Oral at bedtime  metoprolol succinate ER 50 milliGRAM(s) Oral daily  multivitamin 1 Tablet(s) Oral daily  QUEtiapine 50 milliGRAM(s) Oral at bedtime  QUEtiapine 50 milliGRAM(s) Oral daily  senna 2 Tablet(s) Oral at bedtime    MEDICATIONS  (PRN):  acetaminophen     Tablet .. 650 milliGRAM(s) Oral every 6 hours PRN Temp greater or equal to 38C (100.4F), Mild Pain (1 - 3)  albuterol    90 MICROgram(s) HFA Inhaler 2 Puff(s) Inhalation every 6 hours PRN Shortness of Breath and/or Wheezing  aluminum hydroxide/magnesium hydroxide/simethicone Suspension 30 milliLiter(s) Oral every 4 hours PRN Dyspepsia  bisacodyl 5 milliGRAM(s) Oral every 12 hours PRN Constipation  dextrose Oral Gel 15 Gram(s) Oral once PRN Blood Glucose LESS THAN 70 milliGRAM(s)/deciliter  OLANZapine Injectable 1.25 milliGRAM(s) IntraMuscular every 6 hours PRN agitation  simethicone 80 milliGRAM(s) Chew three times a day PRN Gas  
MEDICATIONS  (STANDING):  chlorhexidine 2% Cloths 1 Application(s) Topical <User Schedule>  cholecalciferol 1000 Unit(s) Oral daily  dextrose 5%. 1000 milliLiter(s) (50 mL/Hr) IV Continuous <Continuous>  dextrose 5%. 1000 milliLiter(s) (100 mL/Hr) IV Continuous <Continuous>  dextrose 50% Injectable 25 Gram(s) IV Push once  dextrose 50% Injectable 12.5 Gram(s) IV Push once  dextrose 50% Injectable 25 Gram(s) IV Push once  divalproex Sprinkle 1000 milliGRAM(s) Oral every 12 hours  enoxaparin Injectable 40 milliGRAM(s) SubCutaneous every 24 hours  glucagon  Injectable 1 milliGRAM(s) IntraMuscular once  insulin lispro (ADMELOG) corrective regimen sliding scale   SubCutaneous at bedtime  insulin lispro (ADMELOG) corrective regimen sliding scale   SubCutaneous three times a day before meals  levothyroxine      levothyroxine 75 MICROGram(s) Oral daily  melatonin 6 milliGRAM(s) Oral at bedtime  metoprolol succinate ER 50 milliGRAM(s) Oral daily  multivitamin 1 Tablet(s) Oral daily  QUEtiapine 75 milliGRAM(s) Oral daily  QUEtiapine 75 milliGRAM(s) Oral at bedtime  senna 2 Tablet(s) Oral at bedtime    MEDICATIONS  (PRN):  acetaminophen     Tablet .. 650 milliGRAM(s) Oral every 6 hours PRN Temp greater or equal to 38C (100.4F), Mild Pain (1 - 3)  albuterol    90 MICROgram(s) HFA Inhaler 2 Puff(s) Inhalation every 6 hours PRN Shortness of Breath and/or Wheezing  aluminum hydroxide/magnesium hydroxide/simethicone Suspension 30 milliLiter(s) Oral every 4 hours PRN Dyspepsia  bisacodyl 5 milliGRAM(s) Oral every 12 hours PRN Constipation  dextrose Oral Gel 15 Gram(s) Oral once PRN Blood Glucose LESS THAN 70 milliGRAM(s)/deciliter  OLANZapine Injectable 3.75 milliGRAM(s) IntraMuscular every 6 hours PRN agitation

## 2024-10-21 NOTE — BH INPATIENT PSYCHIATRY ASSESSMENT NOTE - NSBHMETABOLIC_PSY_ALL_CORE_FT
BMI: BMI (kg/m2): 27.1 (10-10-24 @ 11:31)  HbA1c: A1C with Estimated Average Glucose Result: 7.3 % (09-29-24 @ 17:15)    Glucose: POCT Blood Glucose.: 106 mg/dL (10-21-24 @ 12:27)    BP: --Vital Signs Last 24 Hrs  T(C): 36.4 (10-21-24 @ 06:40), Max: 36.7 (10-20-24 @ 23:44)  T(F): 97.6 (10-21-24 @ 06:40), Max: 98.1 (10-20-24 @ 23:44)  HR: 99 (10-21-24 @ 06:40) (99 - 108)  BP: 140/100 (10-21-24 @ 06:40) (140/100 - 146/96)  BP(mean): --  RR: 17 (10-21-24 @ 06:40) (17 - 17)  SpO2: 99% (10-21-24 @ 06:40) (99% - 99%)      Lipid Panel: Date/Time: 09-30-24 @ 04:45  Cholesterol, Serum: 148  LDL Cholesterol Calculated: 64  HDL Cholesterol, Serum: 56  Total Cholesterol/HDL Ration Measurement: --  Triglycerides, Serum: 138   BMI: BMI (kg/m2): 29.7 (10-21-24 @ 16:58)  HbA1c: A1C with Estimated Average Glucose Result: 7.3 % (09-29-24 @ 17:15)    Glucose: POCT Blood Glucose.: 106 mg/dL (10-21-24 @ 12:27)    BP: --Vital Signs Last 24 Hrs  T(C): 36.4 (10-21-24 @ 16:58), Max: 36.7 (10-20-24 @ 23:44)  T(F): 97.5 (10-21-24 @ 16:58), Max: 98.1 (10-20-24 @ 23:44)  HR: 99 (10-21-24 @ 06:40) (99 - 108)  BP: 140/100 (10-21-24 @ 06:40) (140/100 - 146/96)  BP(mean): --  RR: 16 (10-21-24 @ 16:58) (16 - 17)  SpO2: 100% (10-21-24 @ 16:58) (99% - 100%)    Orthostatic VS  10-21-24 @ 16:58  Lying BP: --/-- HR: --  Sitting BP: 145/85 HR: 91  Standing BP: 138/85 HR: 91  Site: upper left arm  Mode: electronic    Lipid Panel: Date/Time: 09-30-24 @ 04:45  Cholesterol, Serum: 148  LDL Cholesterol Calculated: 64  HDL Cholesterol, Serum: 56  Total Cholesterol/HDL Ration Measurement: --  Triglycerides, Serum: 138

## 2024-10-21 NOTE — BH CONSULTATION LIAISON PROGRESS NOTE - NSBHFUPINTERVALCCFT_PSY_A_CORE
f/u psychosis
Is compliant with meds for the most part. Can refuse at times  Sleep and appetite fair  Medicine team aware of plan below
On 1:1 for agitation  Inconsistent in her compliance with medications and care  Appetite fair  Care coordinated with medicine-- below plan discussed
Agitated, aggressive towards staff, inconsistent with compliance with meds  Today requiring restraints after she became aggressive towards staff  Medicine team aware of plan below  
" I don't know"
" fine"

## 2024-10-21 NOTE — BH CONSULTATION LIAISON PROGRESS NOTE - NSBHATTESTBILLING_PSY_A_CORE
complains of pain/discomfort
41674-Tclrkiztci OBS or IP - low complexity OR 25-34 mins
06126-Wdobrtnknj OBS or IP - low complexity OR 25-34 mins
62015-Fsljpkqevv OBS or IP - moderate complexity OR 35-49 mins
Non-billable
44726-Lzquqcrlha OBS or IP - low complexity OR 25-34 mins
05944-Bekgniqcyk OBS or IP - moderate complexity OR 35-49 mins
74263-Tfqphebons OBS or IP - moderate complexity OR 35-49 mins
68941-Qwnhvohbwp OBS or IP - moderate complexity OR 35-49 mins

## 2024-10-21 NOTE — BH CONSULTATION LIAISON PROGRESS NOTE - NSBHPTASSESSDT_PSY_A_CORE
03-Oct-2024 09:59
09-Oct-2024 15:14
11-Oct-2024 13:37
04-Oct-2024 11:07
07-Oct-2024 11:50
14-Oct-2024 16:10
17-Oct-2024 20:31
02-Oct-2024 13:43
18-Oct-2024 11:28
21-Oct-2024 11:28
01-Oct-2024 15:09

## 2024-10-21 NOTE — BH INPATIENT PSYCHIATRY ASSESSMENT NOTE - OTHER PAST PSYCHIATRIC HISTORY (INCLUDE DETAILS REGARDING ONSET, COURSE OF ILLNESS, INPATIENT/OUTPATIENT TREATMENT)
Recent transfer from [facility]. Has been hospitalized since January. History of multiple hospitalizations. Currently on Depakote, Seroquel, Prolixin Dec.

## 2024-10-21 NOTE — DISCHARGE NOTE NURSING/CASE MANAGEMENT/SOCIAL WORK - NSDCPEFALRISK_GEN_ALL_CORE
For information on Fall & Injury Prevention, visit: [email or web address] OR  [email or web address] OR  https://www.cdc.gov/steadi/patient.html

## 2024-10-21 NOTE — BH CONSULTATION LIAISON PROGRESS NOTE - NS ED BHA REVIEW OF ED CHART AVAILABLE LABS REVIEWED
None available
Yes
None available
Yes
None available
Yes
None available
None available
Yes
Yes
None available

## 2024-10-21 NOTE — BH CONSULTATION LIAISON PROGRESS NOTE - NSBHMSEAFFRANGE_PSY_A_CORE
Labile
Constricted
Labile
Constricted
Constricted
Labile
Constricted
Full
Full
Constricted
Constricted

## 2024-10-21 NOTE — BH CONSULTATION LIAISON PROGRESS NOTE - NSBHATTESTTYPEVISIT_PSY_A_CORE
Attending with Resident/Fellow/Student
Attending Only
Resident/Fellow with telephonic supervision
Attending with Resident/Fellow/Student
Resident/Fellow with telephonic supervision

## 2024-10-21 NOTE — DISCHARGE NOTE NURSING/CASE MANAGEMENT/SOCIAL WORK - NSFLUVACAGEDISCH_IMM_ALL_CORE
Adult
Take over the counter pain medication/Prescriptions electronically submitted to pharmacy from doctor's office

## 2024-10-21 NOTE — BH CONSULTATION LIAISON PROGRESS NOTE - NSBHMSEPERCEPT_PSY_A_CORE
No abnormalities
Auditory hallucinations
Unable to assess
No abnormalities

## 2024-10-21 NOTE — BH CONSULTATION LIAISON PROGRESS NOTE - NSBHFUPREASONCONS_PSY_A_CORE
psychosis/med management
psychosis
agitation/psychosis/med management
psychosis
agitation/psychosis/med management
psychosis

## 2024-10-21 NOTE — BH PATIENT PROFILE - NSBHTHTCONTENT_PSY_A_CORE
11/20/20 INCREASED IOP, ON ALPHAGAN TID, , TIMOLOL/DORZOLAMIDE TID, WILL ADD JANE LARSON , SEE COAG SPECIALIST.
12/9/21 TO RESTART BRIMONIDINE BID OS.
2/19/21 IOP INCRTEASED , RAN OUT OF DROPS,.
8 3 22 DESPITE DROPS IOP 37 - ADD LATANAPROST.
8 3 22 EXTRAFOVEAL - FOCAL - INCREASING - TOLD SHE NO LONGER NEEDS LIVER TRANSPLANT.
ALLERGIC TO SULFA.
ARTIFICIAL TEARS to affected eye(s) as needed.
B-scan ultrasound showed no evidence of retinal tear or detachment.
BMI above normal limits, recommended weight loss, improve diet and follow up with internist.
Continue to MONITOR CLOSELY to determine the need for TREATMENT and INCREASE/DECREASE in length of time till next follow up visit.
DECREASED VA, HAS BLOOD GLUCOSE NOT CONTROLLED.
DUE TO ABOVE.
Discussed increased risk of floaters, myopic degeneration and retinal detachment associated with high myopia.
Discussed the importance of blood sugar and blood pressure control.
Discussed the importance of blood sugar control in the prevention of ocular complications.
Does NOT APPEAR VISUALLY SIGNIFICANT.
IMPROVED.
MONITOR response to TREATMENT.
My findings and recommendations are based on patient's symptoms, eye exam, diagnostic testing, and records.
NO SHOWED, IOP IMPROVED 1/15/21.
No retinal tears or retinal detachment seen on clinical exam today. Reviewed the signs and symptoms of retinal tear/retinal detachment and the importance of calling for prompt evaluation should there be increasing floaters, new flashing lights, or decreasing peripheral vision in either eye at any time. Observation recommended.
RESOLVED.
Recommended OBSERVATION and MONITORING for change.
Recommended OBSERVATION and continued MONITORING for progression.
Retinal exam findings communicated to Physician managing diabetes.
TO START BRIMONIDINE BID.
The level of diabetic retinopathy was communicated to provider.
The presence of macula edema findings were communicated to provider.
VA DECREASED DUE TO TRAUMATIC OPTIC NEUROPATHY, POSSIBLE.
Well positioned.
disorganized

## 2024-10-21 NOTE — BH CONSULTATION LIAISON PROGRESS NOTE - NSBHMSETHTPROC_PSY_A_CORE
Disorganized/Circumstantial/Tangential/Impaired reasoning Disorganized/Circumstantial/Tangential/Illogical/Impaired reasoning

## 2024-10-21 NOTE — BH CONSULTATION LIAISON PROGRESS NOTE - NSBHCHARTREVIEWLAB_PSY_A_CORE FT
10-04    142  |  106  |  14  ----------------------------<  133[H]  4.1   |  25  |  0.76    Ca    9.3      04 Oct 2024 04:48  Phos  3.5     10-04  Mg     1.60     10-04    TPro  6.6  /  Alb  3.2[L]  /  TBili  <0.2  /  DBili  x   /  AST  26  /  ALT  19  /  AlkPhos  68  10-04  
10/21 Glucose POCT Glucose 8:24A 177 (last 24 hr 142-223)  
10-01    141  |  105  |  14  ----------------------------<  73  4.5   |  24  |  0.70    Ca    8.8      01 Oct 2024 06:00  Phos  3.4     10-01  Mg     1.80     10-01    
10-01    141  |  105  |  14  ----------------------------<  73  4.5   |  24  |  0.70    Ca    8.8      01 Oct 2024 06:00  Phos  3.4     10-01  Mg     1.80     10-01

## 2024-10-21 NOTE — BH INPATIENT PSYCHIATRY ASSESSMENT NOTE - NSBHASSESSSUMMFT_PSY_ALL_CORE
Ms Azul is a 74 yo F w history of SCZ, Parkinson's dz, hypothyroidism, and multiple psych hospitalizations who presents for AMS. Pt transferred from [facility], where she was found to have fallen and was covered in urine. Pt has been hospitalized in [facility] with occasional stays at [facility] for medical concerns since the start of this year for psychosis. Per [facility] notes, pt appears to be awaiting transfer to [facility] hospital. Pt has been delusional at [facility] with recent delusions surrounding pregnancy. Current medications are Depakote 750 mg bid, Seroquel 50 mg bid, and Prolixin Dec 12.5 mg IM q2w (last dose 9/24).     In [facility] stroke code was negative for acute pathology. Pt refused MRI and neuro deemed it not necessary. EEG did not reveal seizure activity and toxic encephalopathy workup was also negative. Psychiatry continued Depakote 1000mg BID, Seroquel 75mg QHS and Prolixin Dec 12.5 mg IM q2w (last dose given on 10/9), next dose due 10/23/24. As per psych CL pt has been irritable, aggressive at times requiring PRN medications and restraints. Paranoid and disorganized.     On arrival to  pt continues to present paranoid delusions, mentions she was poisoned by nurses at [facility], continues to endorse delusions of pregnancy. Denies SI/HI. Requires inpatient level of care due to inability to care for herself or transition to outpatient level of care given severe episodes of agitation, aggression and disorganized behavior in the context of psychosis.    Plan:  -Admit to 2s under [facility] legal status  -Routine checks  -Bed block  -Continue Depakote 1000mg BID  -Continue Seroquel 75mg QHS  -Prolixin Dec 12.5mg last received 10/9/24. Next dose due 10/23/24  -Neuro workup at [facility] negative for seizure activity or acute stroke

## 2024-10-21 NOTE — BH INPATIENT PSYCHIATRY ASSESSMENT NOTE - NSICDXPASTSURGICALHX_GEN_ALL_CORE_FT
Subjective:   Patient ID:  Abdirahman Rodriguez is a 67 y.o. male who presents for evaluation of No chief complaint on file.      HPI  A 66 yo maLE  WHO HAD COVID AND HAD RESIDUAL LUNG EFFECT WAS SEEN BY PULMONARY AT THAT TIME. HE IS KNOWN TO HAVE HTN HE SNORES AT NITE . HE HAS NOT HAD SLEEP EVAL. 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 HE HAS TO REST FOR 5 MINUTES. HAD ONE EPISODE OF CHEST PAIN CHEST PAIN  WITH PHYSICAL ACTIVITY SHORT LIVED. NO LEG SWELLING. HAS NO PALPITATION. HE HAS NO NOCTURNAL CHEST PAIN.  LAST  EVAL FOR COVID F/U WAS IN 6/2022  Past Medical History:   Diagnosis Date    Benign hypertension     Class 1 obesity in adult 5/25/2023    History of colon polyps     Hypergammaglobulinemia     Hypertensive kidney disease with chronic kidney disease stage III        Past Surgical History:   Procedure Laterality Date    COLONOSCOPY N/A 12/6/2021    Procedure: COLONOSCOPY;  Surgeon: Doris Altman MD;  Location: [facility];  Service: Endoscopy;  Laterality: N/A;    None         Social History     Tobacco Use    Smoking status: Never    Smokeless tobacco: Never   Substance Use Topics    Alcohol use: No     Comment: rarely    Drug use: No       Family History   Problem Relation Age of Onset    Heart disease Father     Hypertension Father     Heart disease Mother     Hypertension Mother        Current Outpatient Medications   Medication Sig    losartan-hydrochlorothiazide 100-25 mg (HYZAAR) 100-25 mg per tablet TAKE 1 TABLET BY MOUTH EVERY DAY     No current facility-administered medications for this visit.     Current Outpatient Medications on File Prior to Visit   Medication Sig    losartan-hydrochlorothiazide 100-25 mg (HYZAAR) 100-25 mg per tablet TAKE 1 TABLET BY MOUTH EVERY DAY     No current facility-administered medications on file prior to visit.       Review of patient's allergies indicates:  No Known Allergies    Review of  Systems   Constitutional: Negative for malaise/fatigue.   Eyes:  Negative for blurred vision.   Cardiovascular:  Positive for chest pain and dyspnea on exertion. Negative for claudication, cyanosis, irregular heartbeat, leg swelling, near-syncope, orthopnea, palpitations and paroxysmal nocturnal dyspnea.   Respiratory:  Positive for shortness of breath. Negative for cough and hemoptysis.    Hematologic/Lymphatic: Negative for bleeding problem. Does not bruise/bleed easily.   Skin:  Negative for dry skin and itching.   Musculoskeletal:  Negative for falls, muscle weakness and myalgias.   Gastrointestinal:  Negative for abdominal pain, diarrhea, heartburn, hematemesis, hematochezia and melena.   Genitourinary:  Negative for flank pain and hematuria.   Neurological:  Negative for dizziness, focal weakness, headaches, light-headedness, numbness, paresthesias, seizures and weakness.   Psychiatric/Behavioral:  Negative for altered mental status and memory loss. The patient is not nervous/anxious.    Allergic/Immunologic: Negative for hives.     Objective:   Physical Exam  Vitals and nursing note reviewed.   Constitutional:       General: He is not in acute distress.     Appearance: He is well-developed. He is obese. He is not diaphoretic.   HENT:      Head: Normocephalic and atraumatic.   Eyes:      General:         Right eye: No discharge.         Left eye: No discharge.      Pupils: Pupils are equal, round, and reactive to light.   Neck:      Thyroid: No thyromegaly.      Vascular: No JVD.   Cardiovascular:      Rate and Rhythm: Normal rate and regular rhythm.      Pulses: Intact distal pulses.      Heart sounds: Normal heart sounds. No murmur heard.    No friction rub. No gallop.   Pulmonary:      Effort: Pulmonary effort is normal. No respiratory distress.      Breath sounds: Normal breath sounds. No wheezing or rales.   Chest:      Chest wall: No tenderness.   Abdominal:      General: Bowel sounds are normal. There  "is no distension.      Palpations: Abdomen is soft.      Tenderness: There is no abdominal tenderness.   Musculoskeletal:         General: Normal range of motion.      Cervical back: Neck supple.   Skin:     General: Skin is warm and dry.      Findings: No erythema or rash.   Neurological:      Mental Status: He is alert and oriented to person, place, and time.      Cranial Nerves: No cranial nerve deficit.   Psychiatric:         Behavior: Behavior normal.     Vitals:    05/25/23 0956 05/25/23 0958   BP: 130/80 126/82   BP Location: Left arm Right arm   Patient Position: Sitting Sitting   BP Method: Large (Manual) Large (Manual)   Pulse: 84    SpO2: (!) 91%    Weight: 121.3 kg (267 lb 6.7 oz)    Height: 6' 2" (1.88 m)      Lab Results   Component Value Date    CHOL 147 12/28/2022    CHOL 135 11/08/2021    CHOL 166 01/28/2009     Body mass index is 34.33 kg/m².   Lab Results   Component Value Date    HGBA1C 6.3 (H) 03/03/2022      BMP  Lab Results   Component Value Date     12/28/2022    K 4.3 12/28/2022     12/28/2022    CO2 25 12/28/2022    BUN 23 12/28/2022    CREATININE 1.5 (H) 12/28/2022    CALCIUM 9.1 12/28/2022    ANIONGAP 8 12/28/2022    EGFRNORACEVR 51.3 (A) 12/28/2022      Lab Results   Component Value Date    HDL 31 (L) 12/28/2022    HDL 33 (L) 11/08/2021    HDL 38 (L) 01/28/2009     Lab Results   Component Value Date    LDLCALC 102.2 12/28/2022    LDLCALC 87.8 11/08/2021    LDLCALC 111.8 01/28/2009     Lab Results   Component Value Date    TRIG 69 12/28/2022    TRIG 71 11/08/2021    TRIG 81 01/28/2009     Lab Results   Component Value Date    CHOLHDL 21.1 12/28/2022    CHOLHDL 24.4 11/08/2021    CHOLHDL 22.9 01/28/2009       Chemistry        Component Value Date/Time     12/28/2022 0937    K 4.3 12/28/2022 0937     12/28/2022 0937    CO2 25 12/28/2022 0937    BUN 23 12/28/2022 0937    CREATININE 1.5 (H) 12/28/2022 0937     12/28/2022 0937        Component Value Date/Time    " CALCIUM 9.1 12/28/2022 0937    ALKPHOS 53 (L) 12/28/2022 0937    AST 25 12/28/2022 0937    ALT 24 12/28/2022 0937    BILITOT 0.6 12/28/2022 0937    ESTGFRAFRICA 48.2 (A) 06/15/2022 0749    EGFRNONAA 41.7 (A) 06/15/2022 0749          Lab Results   Component Value Date    TSH 4.143 (H) 12/28/2022     Lab Results   Component Value Date    INR 1.0 03/03/2022    INR 1.0 03/03/2022     Lab Results   Component Value Date    WBC 3.95 12/28/2022    HGB 14.6 12/28/2022    HCT 45.8 12/28/2022    MCV 95 12/28/2022     12/28/2022     BNP  @LABRCNTIP(BNP,BNPTRIAGEBLO)@  CrCl cannot be calculated (Patient's most recent lab result is older than the maximum 7 days allowed.).  Assessment:     1. Short of breath on exertion    2. Chronic respiratory failure with hypoxia    3. Diffusion capacity of lung (dl), decreased    4. Restrictive lung disease    5. Benign hypertension    6. Mixed hyperlipidemia    7. Stage 3a chronic kidney disease    8. Hypertensive kidney disease with stage 3a chronic kidney disease    9. Prediabetes    10. Abnormal EKG    11. Class 1 obesity with serious comorbidity and body mass index (BMI) of 34.0 to 34.9 in adult, unspecified obesity type      HE IS S/P COVID WITH LUNG INVOLVEMENT HIS SYMPTOMS SRE MULTIFACTORIAL WHICH COULD UNCONTROLLED HTN THAT IS LABILE HE WAS COUNSELED ABOUT COMPLIANCE SALT INTAke AND BP CHART.  HE IS ALSO A SET UP FOR SLEEP APNEA DUE TO SNORING AND OBESITY THAT COULD CONTRIBUTE TO SYMPTOMS   HE HAD HYPOXIA WITH HIS LUNG INFILTRATES was advised o2 therapy that eh did not have and no f/u since last year needs pulmonary f/u and at least repeat cxr . In addition he ahs multiple risk factors for cad and he is prediabetic will obtain echo cardiolite because his symptoms could be new onset angina.    Pedro reviewed his pulmonary w/u and his covid tehrapy and events.   Plan:   ECHO CARDIOLITE    CXR   SLEEP EVAL  LOW SALT   BP PROFILE    AND F/U IN 1 MONTHS      PAST SURGICAL HISTORY:  No significant past surgical history

## 2024-10-21 NOTE — BH INPATIENT PSYCHIATRY ASSESSMENT NOTE - HPI (INCLUDE ILLNESS QUALITY, SEVERITY, DURATION, TIMING, CONTEXT, MODIFYING FACTORS, ASSOCIATED SIGNS AND SYMPTOMS)
Ms Azul is a 74 yo F w history of SCZ, Parkinson's dz, hypothyroidism, and multiple psych hospitalizations who presents for AMS. Pt transferred from [facility], where she was found to have fallen and was covered in urine. Pt has been hospitalized in [facility] with occasional stays at [facility] for medical concerns since the start of this year for psychosis. Per [facility] notes, pt appears to be awaiting transfer to [facility] hospital. Pt has been delusional at [facility] with recent delusions surrounding pregnancy. Current medications are Depakote 750 mg bid, Seroquel 50 mg bid, and Prolixin Dec 12.5 mg IM q2w (last dose 9/24).     In [facility] stroke code was negative for acute pathology. Pt refused MRI and neuro deemed it not necessary. EEG did not reveal seizure activity and toxic encephalopathy workup was also negative. Psychiatry continued Depakote 1000mg BID, Seroquel 75mg QHS and Prolixin Dec 12.5 mg IM q2w (last dose given on 10/9), next dose due 10/23/24. As per psych CL pt has been irritable, aggressive at times requiring PRN medications and restraints. Paranoid and disorganized.     On arrival to  Ms. Azul is calm and cooperative with intake process. Appeared tired during encounter. No PRNs received today other than Insulin given as per sliding scale parameters at 9am. VSS. Fingerstick _____. Pt recalls writer. Reports feeling "so-so" and feeling people in [facility] treated her "so-so" as well. Reports paranoid belief she was poisoned in [facility] and because of that now she has a new tooth coming out: "my gums bleed when I brush my teeth too". Does not verbalize delusions about pregnancy to writer but as per handoff pt has continued endorsing this belief on and off. Denies being in any pain or discomfort. Denies suicidal or homicidal ideation, intent or plans but adds that she will respond if "someone messes with me". Will keep patient in a room block due to hx of aggression towards roommates.

## 2024-10-21 NOTE — BH INPATIENT PSYCHIATRY ASSESSMENT NOTE - NSBHCHARTREVIEWVS_PSY_A_CORE FT
Vital Signs Last 24 Hrs  T(C): 36.4 (10-21-24 @ 06:40), Max: 36.7 (10-20-24 @ 23:44)  T(F): 97.6 (10-21-24 @ 06:40), Max: 98.1 (10-20-24 @ 23:44)  HR: 99 (10-21-24 @ 06:40) (99 - 108)  BP: 140/100 (10-21-24 @ 06:40) (140/100 - 146/96)  BP(mean): --  RR: 17 (10-21-24 @ 06:40) (17 - 17)  SpO2: 99% (10-21-24 @ 06:40) (99% - 99%)     Vital Signs Last 24 Hrs  T(C): 36.4 (10-21-24 @ 16:58), Max: 36.7 (10-20-24 @ 23:44)  T(F): 97.5 (10-21-24 @ 16:58), Max: 98.1 (10-20-24 @ 23:44)  HR: 99 (10-21-24 @ 06:40) (99 - 108)  BP: 140/100 (10-21-24 @ 06:40) (140/100 - 146/96)  BP(mean): --  RR: 16 (10-21-24 @ 16:58) (16 - 17)  SpO2: 100% (10-21-24 @ 16:58) (99% - 100%)    Orthostatic VS  10-21-24 @ 16:58  Lying BP: --/-- HR: --  Sitting BP: 145/85 HR: 91  Standing BP: 138/85 HR: 91  Site: upper left arm  Mode: electronic

## 2024-10-21 NOTE — BH PATIENT PROFILE - FALL HARM RISK - RISK INTERVENTIONS
Assistance OOB with selected safe patient handling equipment/Assistance with ambulation/Communicate Fall Risk and Risk Factors to all staff, patient, and family/Discuss with provider need for PT consult/Monitor gait and stability/Orthostatic vital signs/Reinforce activity limits and safety measures with patient and family/Review medications for side effects contributing to fall risk/Visual Cue: Yellow wristband/Call bell, personal items and telephone in reach/Instruct patient to call for assistance before getting out of bed or chair/Non-slip footwear when patient is out of bed/[place] to call system/Physically safe environment - no spills, clutter or unnecessary equipment/Purposeful Proactive Rounding/Room/bathroom lighting operational, light cord in reach

## 2024-10-21 NOTE — BH CONSULTATION LIAISON PROGRESS NOTE - NSBHFUPINTERVALHXFT_PSY_A_CORE
Patient seen in room. Patient remains disorganized, tangential. Patient denies issues with sleep or po intake. Patient compliant with po medications, no prns used. Patient more calm, cooperative this morning. Patient denies SIIP. Patient did not appear internally pre-occupied though significantly disorganized. Patient will be admitted to [facility] 2S today on 9.27, legals to be completed. Patient seen in room. Patient remains disorganized, tangential. Patient denies issues with sleep or po intake. Patient compliant with po medications, no prns used. Patient more calm, cooperative this morning. Patient denies SIIP. Paranoia not as prominent on exam today, though patient seen muttering quietly and disorganized. Patient will be admitted to [facility] 2S today on 9.27, legals to be completed. Patient seen in room. Patient remains disorganized, tangential. Patient denies issues with sleep or po intake. Patient compliant with po medications, no prns used. Patient more calm, cooperative this morning. Patient denies SIIP. Overall pt. continues to be paranoid and seen muttering quietly and  disorganized. Patient will be admitted to [facility] 2S today on 9.27, legals to be completed.

## 2024-10-21 NOTE — BH CONSULTATION LIAISON PROGRESS NOTE - NSICDXBHSECONDARYDX_PSY_ALL_CORE
Noncompliance   Z91.199  

## 2024-10-21 NOTE — BH INPATIENT PSYCHIATRY ASSESSMENT NOTE - PATIENT'S CHIEF COMPLAINT
"I think they gave me poison and one of me teeth is coming out" "I think they gave me poison and one of my teeth is coming out"

## 2024-10-21 NOTE — BH PATIENT PROFILE - BRADEN NUTRITION
Bedside and Verbal shift change report given to Karon Benavides RN (oncoming nurse) by C. Gearold Rockers RN H. Candance Kugel RN  (offgoing nurse). Report included the following information SBAR, Kardex, Intake/Output, MAR and Recent Results. Breakfast intake: 0% of tray and ensure. Pt did have 8 oz of apple juice. Lunch intake: 0% of tray and ensure. Pt did have 1 can of ginger ale. (3) adequate

## 2024-10-21 NOTE — BH CONSULTATION LIAISON PROGRESS NOTE - NSBHMSESPEECH_PSY_A_CORE
Normal volume, rate, productivity, spontaneity and articulation
Abnormal as indicated, otherwise normal...
Abnormal as indicated, otherwise normal...
Normal volume, rate, productivity, spontaneity and articulation
Normal volume, rate, productivity, spontaneity and articulation
Abnormal as indicated, otherwise normal...
Abnormal as indicated, otherwise normal...
Normal volume, rate, productivity, spontaneity and articulation

## 2024-10-21 NOTE — BH CONSULTATION LIAISON PROGRESS NOTE - NSBHMSEBEHAV_PSY_A_CORE
Uncooperative
Uncooperative/Hostile
Uncooperative
Uncooperative/Hostile
Uncooperative
Cooperative

## 2024-10-21 NOTE — DISCHARGE NOTE NURSING/CASE MANAGEMENT/SOCIAL WORK - PATIENT PORTAL LINK FT
You can access the FollowMyHealth Patient Portal offered by [facility] by registering at the following website: [email or web address] By joining [email or web address] [email or web address] portal, you will also be able to view your health information using other applications (apps) compatible with our system.

## 2024-10-21 NOTE — BH PATIENT PROFILE - HOME MEDICATIONS
QUEtiapine 25 mg oral tablet , 3 tab(s) orally once a day  QUEtiapine 25 mg oral tablet , 3 tab(s) orally once a day (at bedtime)  divalproex sodium 125 mg oral delayed release capsule , 8 cap(s) orally 2 times a day  acetaminophen 325 mg oral tablet , 2 tab(s) orally every 6 hours As needed Temp greater or equal to 38C (100.4F), Mild Pain (1 - 3)  metFORMIN 1000 mg oral tablet , 1 tab(s) orally 2 times a day  melatonin 5 mg oral capsule , 1 cap(s) orally once a day (at bedtime)  levothyroxine 75 mcg (0.075 mg) oral capsule , 1 cap(s) orally once a day  lactulose 10 g/15 mL oral syrup , 15 milliliter(s) orally once a day  cholecalciferol 400 intl units (10 mcg) oral tablet , 1 tab(s) orally once a day  senna leaf extract oral tablet , 2 tab(s) orally once a day (at bedtime)  Multiple Vitamins oral tablet , 1 tab(s) orally once a day  metoprolol succinate 50 mg oral tablet, extended release , 1 tab(s) orally once a day  albuterol 90 mcg/inh inhalation aerosol , 2 puff(s) inhaled every 6 hours As needed Shortness of Breath and/or Wheezing

## 2024-10-22 LAB
ALBUMIN SERPL ELPH-MCNC: 3.9 G/DL — SIGNIFICANT CHANGE UP (ref 3.3–5)
ALP SERPL-CCNC: 102 U/L — SIGNIFICANT CHANGE UP (ref 40–120)
ALT FLD-CCNC: 16 U/L — SIGNIFICANT CHANGE UP (ref 4–33)
ANION GAP SERPL CALC-SCNC: 14 MMOL/L — SIGNIFICANT CHANGE UP (ref 7–14)
AST SERPL-CCNC: 20 U/L — SIGNIFICANT CHANGE UP (ref 4–32)
BASOPHILS # BLD AUTO: 0.03 K/UL — SIGNIFICANT CHANGE UP (ref 0–0.2)
BASOPHILS NFR BLD AUTO: 0.5 % — SIGNIFICANT CHANGE UP (ref 0–2)
BILIRUB SERPL-MCNC: <0.2 MG/DL — SIGNIFICANT CHANGE UP (ref 0.2–1.2)
BUN SERPL-MCNC: 26 MG/DL — HIGH (ref 7–23)
CALCIUM SERPL-MCNC: 9.6 MG/DL — SIGNIFICANT CHANGE UP (ref 8.4–10.5)
CHLORIDE SERPL-SCNC: 105 MMOL/L — SIGNIFICANT CHANGE UP (ref 98–107)
CO2 SERPL-SCNC: 21 MMOL/L — LOW (ref 22–31)
CREAT SERPL-MCNC: 0.75 MG/DL — SIGNIFICANT CHANGE UP (ref 0.5–1.3)
EGFR: 85 ML/MIN/1.73M2 — SIGNIFICANT CHANGE UP
EOSINOPHIL # BLD AUTO: 0.13 K/UL — SIGNIFICANT CHANGE UP (ref 0–0.5)
EOSINOPHIL NFR BLD AUTO: 2.1 % — SIGNIFICANT CHANGE UP (ref 0–6)
GLUCOSE BLDC GLUCOMTR-MCNC: 139 MG/DL — HIGH (ref 70–99)
GLUCOSE SERPL-MCNC: 201 MG/DL — HIGH (ref 70–99)
HCT VFR BLD CALC: 37.6 % — SIGNIFICANT CHANGE UP (ref 34.5–45)
HGB BLD-MCNC: 11.9 G/DL — SIGNIFICANT CHANGE UP (ref 11.5–15.5)
IANC: 4.1 K/UL — SIGNIFICANT CHANGE UP (ref 1.8–7.4)
IMM GRANULOCYTES NFR BLD AUTO: 0.7 % — SIGNIFICANT CHANGE UP (ref 0–0.9)
LYMPHOCYTES # BLD AUTO: 1.15 K/UL — SIGNIFICANT CHANGE UP (ref 1–3.3)
LYMPHOCYTES # BLD AUTO: 18.8 % — SIGNIFICANT CHANGE UP (ref 13–44)
MAGNESIUM SERPL-MCNC: 2 MG/DL — SIGNIFICANT CHANGE UP (ref 1.6–2.6)
MCHC RBC-ENTMCNC: 26.7 PG — LOW (ref 27–34)
MCHC RBC-ENTMCNC: 31.6 GM/DL — LOW (ref 32–36)
MCV RBC AUTO: 84.5 FL — SIGNIFICANT CHANGE UP (ref 80–100)
MONOCYTES # BLD AUTO: 0.66 K/UL — SIGNIFICANT CHANGE UP (ref 0–0.9)
MONOCYTES NFR BLD AUTO: 10.8 % — SIGNIFICANT CHANGE UP (ref 2–14)
NEUTROPHILS # BLD AUTO: 4.1 K/UL — SIGNIFICANT CHANGE UP (ref 1.8–7.4)
NEUTROPHILS NFR BLD AUTO: 67.1 % — SIGNIFICANT CHANGE UP (ref 43–77)
NRBC # BLD AUTO: 0 K/UL — SIGNIFICANT CHANGE UP (ref 0–0)
NRBC # BLD: 0 /100 WBCS — SIGNIFICANT CHANGE UP (ref 0–0)
NRBC # FLD: 0 K/UL — SIGNIFICANT CHANGE UP (ref 0–0)
NRBC BLD-RTO: 0 /100 WBCS — SIGNIFICANT CHANGE UP (ref 0–0)
PHOSPHATE SERPL-MCNC: 3.7 MG/DL — SIGNIFICANT CHANGE UP (ref 2.5–4.5)
PLATELET # BLD AUTO: 200 K/UL — SIGNIFICANT CHANGE UP (ref 150–400)
POTASSIUM SERPL-MCNC: 4.6 MMOL/L — SIGNIFICANT CHANGE UP (ref 3.5–5.3)
POTASSIUM SERPL-SCNC: 4.6 MMOL/L — SIGNIFICANT CHANGE UP (ref 3.5–5.3)
PROT SERPL-MCNC: 8 G/DL — SIGNIFICANT CHANGE UP (ref 6–8.3)
RBC # BLD: 4.45 M/UL — SIGNIFICANT CHANGE UP (ref 3.8–5.2)
RBC # FLD: 15.8 % — HIGH (ref 10.3–14.5)
SODIUM SERPL-SCNC: 140 MMOL/L — SIGNIFICANT CHANGE UP (ref 135–145)
VALPROATE SERPL-MCNC: 65.7 UG/ML — SIGNIFICANT CHANGE UP (ref 50–100)
WBC # BLD: 6.11 K/UL — SIGNIFICANT CHANGE UP (ref 3.8–10.5)
WBC # FLD AUTO: 6.11 K/UL — SIGNIFICANT CHANGE UP (ref 3.8–10.5)

## 2024-10-22 PROCEDURE — 99232 SBSQ HOSP IP/OBS MODERATE 35: CPT

## 2024-10-22 PROCEDURE — 99223 1ST HOSP IP/OBS HIGH 75: CPT

## 2024-10-22 RX ORDER — FLUPHENAZINE HCL 5 MG
12.5 TABLET ORAL ONCE
Refills: 0 | Status: COMPLETED | OUTPATIENT
Start: 2024-10-23 | End: 2024-10-23

## 2024-10-22 RX ADMIN — Medication 1000 UNIT(S): at 09:16

## 2024-10-22 RX ADMIN — QUETIAPINE FUMARATE 75 MILLIGRAM(S): 300 TABLET ORAL at 21:26

## 2024-10-22 RX ADMIN — Medication 50 MILLIGRAM(S): at 09:16

## 2024-10-22 RX ADMIN — ACETAMINOPHEN, DIPHENHYDRAMINE HCL, PHENYLEPHRINE HCL 6 MILLIGRAM(S): 325; 25; 5 TABLET ORAL at 21:24

## 2024-10-22 RX ADMIN — Medication 1000 MILLIGRAM(S): at 21:25

## 2024-10-22 RX ADMIN — Medication 1000 MILLIGRAM(S): at 09:16

## 2024-10-22 RX ADMIN — Medication 1 TABLET(S): at 09:15

## 2024-10-22 RX ADMIN — QUETIAPINE FUMARATE 75 MILLIGRAM(S): 300 TABLET ORAL at 09:16

## 2024-10-22 NOTE — BH INPATIENT PSYCHIATRY PROGRESS NOTE - NSBHMETABOLIC_PSY_ALL_CORE_FT
BMI: BMI (kg/m2): 29.7 (10-21-24 @ 16:58)  HbA1c: A1C with Estimated Average Glucose Result: 7.3 % (09-29-24 @ 17:15)    Glucose: POCT Blood Glucose.: 139 mg/dL (10-22-24 @ 07:48)    BP: --Vital Signs Last 24 Hrs  T(C): 36.4 (10-21-24 @ 16:58), Max: 36.4 (10-21-24 @ 16:58)  T(F): 97.5 (10-21-24 @ 16:58), Max: 97.5 (10-21-24 @ 16:58)  HR: --  BP: --  BP(mean): --  RR: 16 (10-21-24 @ 16:58) (16 - 16)  SpO2: 100% (10-21-24 @ 16:58) (100% - 100%)    Orthostatic VS  10-21-24 @ 16:58  Lying BP: --/-- HR: --  Sitting BP: 145/85 HR: 91  Standing BP: 138/85 HR: 91  Site: upper left arm  Mode: electronic    Lipid Panel: Date/Time: 09-30-24 @ 04:45  Cholesterol, Serum: 148  LDL Cholesterol Calculated: 64  HDL Cholesterol, Serum: 56  Total Cholesterol/HDL Ration Measurement: --  Triglycerides, Serum: 138

## 2024-10-22 NOTE — BH SOCIAL WORK INITIAL PSYCHOSOCIAL EVALUATION - NSHIGHRISKBEHFT_PSY_ALL_CORE
The pt has a hx of agitation and verbally abusive and physically threatening behavior. The pt has been agitated and aggressive with peers and staff.

## 2024-10-22 NOTE — BH INPATIENT PSYCHIATRY PROGRESS NOTE - NSBHCHARTREVIEWVS_PSY_A_CORE FT
Vital Signs Last 24 Hrs  T(C): 36.4 (10-21-24 @ 16:58), Max: 36.4 (10-21-24 @ 16:58)  T(F): 97.5 (10-21-24 @ 16:58), Max: 97.5 (10-21-24 @ 16:58)  HR: --  BP: --  BP(mean): --  RR: 16 (10-21-24 @ 16:58) (16 - 16)  SpO2: 100% (10-21-24 @ 16:58) (100% - 100%)    Orthostatic VS  10-21-24 @ 16:58  Lying BP: --/-- HR: --  Sitting BP: 145/85 HR: 91  Standing BP: 138/85 HR: 91  Site: upper left arm  Mode: electronic

## 2024-10-22 NOTE — CONSULT NOTE ADULT - ASSESSMENT
72yo F with PMH of vascular dementia, Parkinson’s disease, retinopathy with macular edema, asthma, seizure disorder, hypothyroidism, GERD, HLD, MDD, anxiety, MI, malignant neoplasm of the bladder, hydrocephalus, TERRY, schizophrenia, and HTN who was transferred to [facility] 9/29/24 for suspected seizure, transferred back to [facility] 10/21/24    #Seizure d/o  - c/w divalproex 1000mg po BID, dose increase at [facility]    #T2DM  - continue metformin 1000 bid  - HbA1c 7.9% 8/27/24  - CC diet    #HTN  - c/w metoprolol  - avoid agents that may trigger angioedema (ACEI)    #Schizophrenia  - plan and meds per psych    #Parkison’s disease  - c/w carbidopa/levodopa    #Hypothyroidism  - c/w levothyroxine 75mcg po qam

## 2024-10-22 NOTE — BH INPATIENT PSYCHIATRY PROGRESS NOTE - CURRENT MEDICATION
MEDICATIONS  (STANDING):  cholecalciferol 1000 Unit(s) Oral daily  divalproex Sprinkle 1000 milliGRAM(s) Oral two times a day  glucagon  Injectable 1 milliGRAM(s) IntraMuscular once  insulin lispro (ADMELOG) corrective regimen sliding scale   SubCutaneous at bedtime  insulin lispro (ADMELOG) corrective regimen sliding scale   SubCutaneous three times a day before meals  levothyroxine 75 MICROGram(s) Oral daily  melatonin. 6 milliGRAM(s) Oral at bedtime  metoprolol succinate ER 50 milliGRAM(s) Oral daily  multivitamin 1 Tablet(s) Oral daily  QUEtiapine 75 milliGRAM(s) Oral two times a day  senna 2 Tablet(s) Oral at bedtime    MEDICATIONS  (PRN):  acetaminophen     Tablet .. 650 milliGRAM(s) Oral every 6 hours PRN Mild Pain (1 - 3)  albuterol    90 MICROgram(s) HFA Inhaler 2 Puff(s) Inhalation every 6 hours PRN Bronchospasm  aluminum hydroxide/magnesium hydroxide/simethicone Suspension 30 milliLiter(s) Oral every 4 hours PRN Dyspepsia  dextrose Oral Gel 15 Gram(s) Oral once PRN Blood Glucose LESS THAN 70 milliGRAM(s)/deciliter  OLANZapine 2.5 milliGRAM(s) Oral four times a day PRN agitation  OLANZapine Injectable 3.75 milliGRAM(s) IntraMuscular once PRN agitation

## 2024-10-22 NOTE — PHYSICAL THERAPY INITIAL EVALUATION ADULT - PERTINENT HX OF CURRENT PROBLEM, REHAB EVAL
Patient is a 72 yo F w history of SCZ, Parkinson's dz, hypothyroidism, and multiple psych hospitalizations who presents for AMS. Pt transferred from [facility], where she was found to have fallen and was covered in urine. Pt has been hospitalized in [facility] with occasional stays at [facility] for medical concerns since the start of this year for psychosis. Patient referred to Physical therapy secondary to gait instability

## 2024-10-22 NOTE — CONSULT NOTE ADULT - SUBJECTIVE AND OBJECTIVE BOX
HPI: Pt is 73F Originally admitted to [facility] 1/11/24, transferred between there and [facility] several times, most recently sent from [facility] to [facility]24 with altered mental status and possible seizure activity/postictal state.  She was seen by neurology and depakote garcia was adjusted as blood level was low on admission.  There were no further events and pt returned to baseline.  She was stable for transfer back to [facility] 10/21/24.  She currently is ambulating, denies complaints, but provides limited responses to questioning.    PAST MEDICAL & SURGICAL HISTORY:  Parkinson disease      Seizure disorder      Hypothyroidism      Type II diabetes mellitus      Hyperlipidemia      Schizophrenia      Hemorrhoids      Hypertension      No significant past surgical history          Review of Systems:   limited due to mental state    Allergies    penicillin (Hives; Rash)  [place] (Unknown)  lisinopril (Angioedema)    Intolerances        Social History: no etoh tob idu    FAMILY HISTORY:  No pertinent family history in first degree relatives        MEDICATIONS  (STANDING):  cholecalciferol 1000 Unit(s) Oral daily  divalproex Sprinkle 1000 milliGRAM(s) Oral two times a day  glucagon  Injectable 1 milliGRAM(s) IntraMuscular once  insulin lispro (ADMELOG) corrective regimen sliding scale   SubCutaneous at bedtime  insulin lispro (ADMELOG) corrective regimen sliding scale   SubCutaneous three times a day before meals  levothyroxine 75 MICROGram(s) Oral daily  melatonin. 6 milliGRAM(s) Oral at bedtime  metoprolol succinate ER 50 milliGRAM(s) Oral daily  multivitamin 1 Tablet(s) Oral daily  QUEtiapine 75 milliGRAM(s) Oral two times a day  senna 2 Tablet(s) Oral at bedtime    MEDICATIONS  (PRN):  acetaminophen     Tablet .. 650 milliGRAM(s) Oral every 6 hours PRN Mild Pain (1 - 3)  albuterol    90 MICROgram(s) HFA Inhaler 2 Puff(s) Inhalation every 6 hours PRN Bronchospasm  aluminum hydroxide/magnesium hydroxide/simethicone Suspension 30 milliLiter(s) Oral every 4 hours PRN Dyspepsia  dextrose Oral Gel 15 Gram(s) Oral once PRN Blood Glucose LESS THAN 70 milliGRAM(s)/deciliter  OLANZapine 2.5 milliGRAM(s) Oral four times a day PRN agitation  OLANZapine Injectable 3.75 milliGRAM(s) IntraMuscular once PRN agitation      Vital Signs Last 24 Hrs  T(C): 36.4 (21 Oct 2024 16:58), Max: 36.4 (21 Oct 2024 16:58)  T(F): 97.5 (21 Oct 2024 16:58), Max: 97.5 (21 Oct 2024 16:58)  HR: 91  BP: 145/85  BP(mean): --  RR: 16 (21 Oct 2024 16:58) (16 - 16)  SpO2: 100% (21 Oct 2024 16:58) (100% - 100%)    Parameters below as of 21 Oct 2024 16:58  Patient On (Oxygen Delivery Method): room air      CAPILLARY BLOOD GLUCOSE      POCT Blood Glucose.: 139 mg/dL (22 Oct 2024 07:48)  POCT Blood Glucose.: 132 mg/dL (21 Oct 2024 20:35)        PHYSICAL EXAM:  GENERAL: NAD  HEAD:  Atraumatic, Normocephalic  EYES: EOMI, conjunctiva and sclera clear  NECK: Supple, No JVD  CHEST/LUNG: Clear to auscultation bilaterally; No wheeze  HEART: Regular rate and rhythm; No murmurs, rubs, or gallops  ABDOMEN: Soft, Nontender, Nondistended; Bowel sounds present  EXTREMITIES:  2+ Peripheral Pulses, No clubbing, cyanosis, or edema  NEUROLOGY: non-focal  SKIN: No rashes or lesions    LABS:                        11.9   6.11  )-----------( 200      ( 22 Oct 2024 09:37 )             37.6     10-22    140  |  105  |  26[H]  ----------------------------<  201[H]  4.6   |  21[L]  |  0.75    Ca    9.6      22 Oct 2024 09:37  Phos  3.7     10-22  Mg     2.00     10-22    TPro  8.0  /  Alb  3.9  /  TBili  <0.2  /  DBili  x   /  AST  20  /  ALT  16  /  AlkPhos  102  10-22          Urinalysis Basic - ( 22 Oct 2024 09:37 )    Color: x / Appearance: x / SG: x / pH: x  Gluc: 201 mg/dL / Ketone: x  / Bili: x / Urobili: x   Blood: x / Protein: x / Nitrite: x   Leuk Esterase: x / RBC: x / WBC x   Sq Epi: x / Non Sq Epi: x / Bacteria: x

## 2024-10-22 NOTE — BH SOCIAL WORK INITIAL PSYCHOSOCIAL EVALUATION - OTHER PAST PSYCHIATRIC HISTORY (INCLUDE DETAILS REGARDING ONSET, COURSE OF ILLNESS, INPATIENT/OUTPATIENT TREATMENT)
The pt is a 74 yo woman with a long hx of psychiatric illness, dx SAD, multiple medical problems, who has been hospitalized in [facility] since 1/17/24, treated for psychosis, and on the wait list for transfer to [facility]. The pt was transferred to [facility] twice during her [facility] stay, most recently on 9/28/24 for AMS after being found on the floor covered in urine. While at [facility] the pt was evaluated for stroke which was negative, followed by C&L, treated with Depakote, Seroquel and Prolixin decanoate, and reported to have been paranoid, agitated, aggressive, requiring prns and restraints. The pt was medically cleared for transfer back to [facility] on 10/21/24. SEE PRIOR HX: The pt is a 74 yo [place] woman with a dx SAD and Vascular Dementia with behavioral disturbance and long hx of psychiatric illness and hospitalizations. The pt had been residing in [facility] since 2016 and most recently hospitalized in [facility] unit 1/17/24. The pt had multiple medication trials (Haldol, Seroquel, Zyprexa, Prolixin, and Abilify). The pt was initially medication compliant but later became non-compliant and was treated with court ordered medication. The pt continued to show persistent symptoms of psychosis, agitation, and aggressive behavior. The pt was transferred to [facility] on 5/17/24 due to angioedema of the lower lip and was medically cleared for return to [facility] on 5/21/24.  SEE PRIOR HX: Pt. is a 74 y/o [place] woman residing in [facility] since 2016 with a dx of Schizophrenia and Vascular Dementia with behavioral disturbance and multiple medical problems including Parkinson's Disease, Seizure Disorder, HTN, T2DM, Asthma, Gerd, and Hypothyroidism. The pt has a hx of multiple psychiatric hospitalizations including [facility] and [facility] 2021, and according to NH, 2 hospitalizations in 2023.  The pt has a history of agitation and aggression towards staff and peers but no hx of SI/SA/SIB.  Pt's [facility] [phone] , Stephanie Morales, reported that the pt is compliant with medication but due to nursing home policy of gradual dose reduction, the pt's Haldol dose was steadily decreased and stopped by 10/23. The pt became agitated and aggressive this past week. EMS was called 1/11/24 and the pt was brought to [facility], where she was evaluated medically, followed by C&L, and medically cleared for transfer to [facility] 1/17/24.

## 2024-10-22 NOTE — BH INPATIENT PSYCHIATRY PROGRESS NOTE - NSBHFUPINTERVALHXFT_PSY_A_CORE
Ms. Azul was seen today for follow up of Schizophrenia. She was transferred from [facility] yesterday evening. Has been compliant with her medications. No overnight events reported by staff or PRNs needed. Fingerstick was 139 this morning. Ms. Azul refused to have her vital signs taken this morning. Bloodwork reviewed and WNL. Valproic acid level within therapeutic range at 65.70. Pt is visible in the dayroom today. Seems sedated, dozing off during interview. Denies acute complaints.

## 2024-10-23 ENCOUNTER — NON-APPOINTMENT (OUTPATIENT)
Age: 74
End: 2024-10-23

## 2024-10-23 LAB
GLUCOSE BLDC GLUCOMTR-MCNC: 136 MG/DL — HIGH (ref 70–99)
GLUCOSE BLDC GLUCOMTR-MCNC: 157 MG/DL — HIGH (ref 70–99)
GLUCOSE BLDC GLUCOMTR-MCNC: 171 MG/DL — HIGH (ref 70–99)
GLUCOSE BLDC GLUCOMTR-MCNC: 210 MG/DL — HIGH (ref 70–99)

## 2024-10-23 PROCEDURE — 99232 SBSQ HOSP IP/OBS MODERATE 35: CPT

## 2024-10-23 RX ORDER — OLANZAPINE 10 MG/1
2.5 TABLET, FILM COATED ORAL ONCE
Refills: 0 | Status: DISCONTINUED | OUTPATIENT
Start: 2024-10-23 | End: 2024-10-28

## 2024-10-23 RX ORDER — OLANZAPINE 10 MG/1
2.5 TABLET, FILM COATED ORAL ONCE
Refills: 0 | Status: COMPLETED | OUTPATIENT
Start: 2024-10-23 | End: 2024-10-23

## 2024-10-23 RX ORDER — DM/PSEUDOEPHED/ACETAMINOPHEN 10-30-250
15 CAPSULE ORAL ONCE
Refills: 0 | Status: DISCONTINUED | OUTPATIENT
Start: 2024-10-23 | End: 2025-01-29

## 2024-10-23 RX ADMIN — Medication 1000 MILLIGRAM(S): at 09:21

## 2024-10-23 RX ADMIN — Medication 50 MILLIGRAM(S): at 09:22

## 2024-10-23 RX ADMIN — ACETAMINOPHEN, DIPHENHYDRAMINE HCL, PHENYLEPHRINE HCL 6 MILLIGRAM(S): 325; 25; 5 TABLET ORAL at 21:19

## 2024-10-23 RX ADMIN — Medication 1 TABLET(S): at 09:22

## 2024-10-23 RX ADMIN — Medication 1000 MILLIGRAM(S): at 21:18

## 2024-10-23 RX ADMIN — Medication 1000 UNIT(S): at 09:21

## 2024-10-23 RX ADMIN — OLANZAPINE 2.5 MILLIGRAM(S): 10 TABLET, FILM COATED ORAL at 17:13

## 2024-10-23 RX ADMIN — QUETIAPINE FUMARATE 75 MILLIGRAM(S): 300 TABLET ORAL at 21:19

## 2024-10-23 RX ADMIN — QUETIAPINE FUMARATE 75 MILLIGRAM(S): 300 TABLET ORAL at 09:22

## 2024-10-23 NOTE — BH INPATIENT PSYCHIATRY PROGRESS NOTE - NSBHASSESSSUMMFT_PSY_ALL_CORE
Ms Azul is a 72 yo F w history of SCZ, Parkinson's dz, hypothyroidism, and multiple psych hospitalizations who presents for AMS. Pt transferred from [facility], where she was found to have fallen and was covered in urine. Pt has been hospitalized in [facility] with occasional stays at [facility] for medical concerns since the start of this year for psychosis. Per [facility] notes, pt appears to be awaiting transfer to [facility] hospital. Pt has been delusional at [facility] with recent delusions surrounding pregnancy. Current medications are Depakote 750 mg bid, Seroquel 50 mg bid, and Prolixin Dec 12.5 mg IM q2w (last dose 9/24).     In [facility] stroke code was negative for acute pathology. Pt refused MRI and neuro deemed it not necessary. EEG did not reveal seizure activity and toxic encephalopathy workup was also negative. Psychiatry continued Depakote 1000mg BID, Seroquel 75mg QHS and Prolixin Dec 12.5 mg IM q2w (last dose given on 10/9), next dose due 10/23/24. As per psych CL pt has been irritable, aggressive at times requiring PRN medications and restraints. Paranoid and disorganized.     On arrival to [facility] pt continues to present paranoid delusions, mentions she was poisoned by nurses at [facility], continues to endorse delusions of pregnancy. Denies SI/HI. Requires inpatient level of care due to inability to care for herself or transition to outpatient level of care given severe episodes of agitation, aggression and disorganized behavior in the context of psychosis.      10/22 Clinical Update: Ms. Azul refused to have her vital signs taken this morning. No behavioral outbursts or aggression reported. Bloodwork reviewed and WNL. Valproic acid level within therapeutic range at 65.70. Pt is visible in the dayroom today. Seems sedated, dozing off during interview. May need Seroquel dose to be adjusted to minimize daytime sedation. Due for Prolixin dec tomorrow.    10/23: Refused VS, irritable on approach, will offer Prolixin Dec tomorrow if more agreeable.  Did take PO meds.      Plan:  -Admit to [facility] under [facility] legal status  -Routine checks  -Bed block  -Continue Depakote 1000mg BID  -Continue Seroquel 75mg QHS  -Prolixin Dec 12.5mg last received 10/9/24. Next dose due 10/23/24  -Neuro workup at [facility] negative for seizure activity or acute stroke

## 2024-10-23 NOTE — DIETITIAN INITIAL EVALUATION ADULT - ADD RECOMMEND
- Defer diet consistency to SLP's rec/MD order. Please consult to SLP for swallow eval if patient no longer tolerated current diet consistency.  - Aspiration and reflux precautions.  - Add Glucerna Therapeutic Nutrition Shake 240mls 2x daily (440kcals, 20g protein).  - Dietary will send Magic cup (reduced sugar) 2x daily (580kcal, 18gm protein).  - c/w Multivitamin and vitamin D3 per MD order.  - Encourage po intake as tolerated and honor food preferences PRN.  - Monitor PO intake/tolerance, weights, labs, BM's, and skin integrity.

## 2024-10-23 NOTE — BH SAFETY PLAN - WARNING SIGN 1
At this time patient is declining the safety plan. Patient was initially hospitalized due to symptoms of paranoia, agitation aggressive behavior, and sexually inappropriate behavior.

## 2024-10-23 NOTE — BH INPATIENT PSYCHIATRY PROGRESS NOTE - OTHER
- We will fax the Tramadol prescription to you pharmacy ([facility] on [address])   - Please get a ferritin level drawn some time in September 2017 at [place]  - We will get the results and will contact you through phone or [employer] to discuss the results  - At that time, we will discuss when you should follow up next        Your BMI is Body mass index is 32.42 kg/(m^2).  Weight management is a personal decision.  If you are interested in exploring weight loss strategies, the following discussion covers the approaches that may be successful. Body mass index (BMI) is one way to tell whether you are at a healthy weight, overweight, or obese. It measures your weight in relation to your height.  A BMI of 18.5 to 24.9 is in the healthy range. A person with a BMI of 25 to 29.9 is considered overweight, and someone with a BMI of 30 or greater is considered obese. More than two-thirds of American adults are considered overweight or obese.  Being overweight or obese increases the risk for further weight gain. Excess weight may lead to heart disease and diabetes.  Creating and following plans for healthy eating and physical activity may help you improve your health.  Weight control is part of healthy lifestyle and includes exercise, emotional health, and healthy eating habits. Careful eating habits lifelong are the mainstay of weight control. Though there are significant health benefits from weight loss, long-term weight loss with diet alone may be very difficult to achieve- studies show long-term success with dietary management in less than 10% of people. Attaining a healthy weight may be especially difficult to achieve in those with severe obesity. In some cases, medications, devices and surgical management might be considered.  What can you do?  If you are overweight or obese and are interested in methods for weight loss, you should discuss this with your provider.     Consider reducing daily calorie  intake by 500 calories.     Keep a food journal.     Avoiding skipping meals, consider cutting portions instead.    Diet combined with exercise helps maintain muscle while optimizing fat loss. Strength training is particularly important for building and maintaining muscle mass. Exercise helps reduce stress, increase energy, and improves fitness. Increasing exercise without diet control, however, may not burn enough calories to loose weight.       Start walking three days a week 10-20 minutes at a time    Work towards walking thirty minutes five days a week     Eventually, increase the speed of your walking for 1-2 minutes at time    In addition, we recommend that you review healthy lifestyles and methods for weight loss available through the National Institutes of Health patient information sites:  http://win.niddk.nih.gov/publications/index.htm    And look into health and wellness programs that may be available through your health insurance provider, employer, local community center, or [employer].    Weight management plan: Patient was referred to their PCP to discuss a diet and exercise plan.       jeaninea bilateral upper extremities

## 2024-10-23 NOTE — DIETITIAN INITIAL EVALUATION ADULT - NS FNS DIET ORDER
Diet, Regular:   DASH/TLC Sodium & Cholesterol Restricted (DASH)  Minced and Moist (MINCEDMOIST) (10-21-24 @ 17:27)

## 2024-10-23 NOTE — BH INPATIENT PSYCHIATRY PROGRESS NOTE - NSBHFUPINTERVALHXFT_PSY_A_CORE
Pt was seen and evaluated today for follow up of Schizophrenia.  Chart was reviewed and case was discussed with treatment team.  Pt has been intermittently agitated but did respond to verbal redirection this morning.  Refused VS this morning but she has been compliant with her medications. No overnight events reported by staff or PRNs needed.  Pt is visible in the dayroom today. Seems sedated at times and irritable at others.  Has been declining offer of SHAFER, recenting required MOO while inpatient.  Denies acute complaints.

## 2024-10-23 NOTE — BH INPATIENT PSYCHIATRY PROGRESS NOTE - NSBHCHARTREVIEWVS_PSY_A_CORE FT
Vital Signs Last 24 Hrs  T(C): --  T(F): --  HR: --  BP: --  BP(mean): --  RR: --  SpO2: --    Orthostatic VS  10-21-24 @ 16:58  Lying BP: --/-- HR: --  Sitting BP: 145/85 HR: 91  Standing BP: 138/85 HR: 91  Site: upper left arm  Mode: electronic

## 2024-10-23 NOTE — DIETITIAN INITIAL EVALUATION ADULT - PERTINENT LABORATORY DATA
10-22    140  |  105  |  26[H]  ----------------------------<  201[H]  4.6   |  21[L]  |  0.75    Ca    9.6      22 Oct 2024 09:37  Phos  3.7     10-22  Mg     2.00     10-22    TPro  8.0  /  Alb  3.9  /  TBili  <0.2  /  DBili  x   /  AST  20  /  ALT  16  /  AlkPhos  102  10-22    CAPILLARY BLOOD GLUCOSE    POCT Blood Glucose.: 171 mg/dL (23 Oct 2024 16:17)  POCT Blood Glucose.: 157 mg/dL (23 Oct 2024 11:28)  POCT Blood Glucose.: 136 mg/dL (23 Oct 2024 07:52)    A1C with Estimated Average Glucose Result: 7.3 % (09-29-24 @ 17:15)  A1C with Estimated Average Glucose Result: 7.9 % (08-27-24 @ 09:18)  A1C with Estimated Average Glucose Result: 8.3 % (05-18-24 @ 06:52)    Lipid Panel: Date/Time: 09-30-24 @ 04:45  Cholesterol, Serum: 148  LDL Cholesterol Calculated: 64  HDL Cholesterol, Serum: 56  Total Cholesterol/HDL Ration Measurement: --  Triglycerides, Serum: 138    Vitamin D, 25-Hydroxy: 43.6 ng/mL (09.29.24 @ 17:15)  Vitamin B12, Serum: 794 pg/mL (09.29.24 @ 17:15)   Vitamin B6: 16.8 ug/L (09.29.24 @ 17:15)   Vitamin B1, Serum: 180.8 nmol/L (09.29.24 @ 17:15)   Folate, Serum: 19.8 ng/mL (09.29.24 @ 17:15)

## 2024-10-23 NOTE — BH INPATIENT PSYCHIATRY PROGRESS NOTE - NSBHMETABOLIC_PSY_ALL_CORE_FT
BMI: BMI (kg/m2): 29.7 (10-21-24 @ 16:58)  HbA1c: A1C with Estimated Average Glucose Result: 7.3 % (09-29-24 @ 17:15)    Glucose: POCT Blood Glucose.: 157 mg/dL (10-23-24 @ 11:28)    BP: --Vital Signs Last 24 Hrs  T(C): --  T(F): --  HR: --  BP: --  BP(mean): --  RR: --  SpO2: --    Orthostatic VS  10-21-24 @ 16:58  Lying BP: --/-- HR: --  Sitting BP: 145/85 HR: 91  Standing BP: 138/85 HR: 91  Site: upper left arm  Mode: electronic    Lipid Panel: Date/Time: 09-30-24 @ 04:45  Cholesterol, Serum: 148  LDL Cholesterol Calculated: 64  HDL Cholesterol, Serum: 56  Total Cholesterol/HDL Ration Measurement: --  Triglycerides, Serum: 138

## 2024-10-23 NOTE — DIETITIAN INITIAL EVALUATION ADULT - OTHER INFO
Patient is a 72 y/o female with PMHx vacular dementia with behavioral disturbance, TERRY, Parkinson's disease, HTN, T2DM asthma, hypothyroidism, retinopathy with macular edema, GERD hx malignant neoplasm of bladder, PPHx of SCZ, Parkinson's dz, and multiple psych hospitalizations, has been hospitalized in [facility] with occasional stays at [facility] for medical concerns since the start of this year for psychosis. Awaiting transfer to [facility]. Pt has been delusional at [facility] with recent delusions surrounding pregnancy. Transferred to [facility] ED on 9/29/24 s/p unwitnessed fall. In [facility] stroke code was negative for acute pathology. Pt refused MRI and neuro deemed it not necessary. EEG did not reveal seizure activity and toxic encephalopathy workup was also negative. As per psych CL pt has been irritable, aggressive at times requiring PRN medications and restraints. Transferred back to [facility] 2S on 10/21/24.     Patient is known to this writer from her [facility] adm. Patient unable to provide meaningful collateral at time of visit today as patient is not a reliable historian, irritable with staff, and she fell asleep on the hallway bench in the middle of the conversation with writer. Collateral information obtained from patient's medical chart and RN. As per RN, patient states she has been getting a regular diet in [facility] and requests for regular solids here in [facility], patient requires feeding assistance at meals, with PO intake <75% this morning. Observed patient with poor dentition. As per chart, s/p MBS on 9/30/24 which recommended a Minced and Moist diet with thin liquids. Patient states she had a hamburger and home fries with oatmeal and OJ at  today (no hamburger was served at ); per RN, patient had oatmeal, scrambled eggs and potatoes this AM with M&M consistency, no reports of chewing/swallowing difficulties on current diet order. Per staff, patient also requests for almond milk and magic cup. Per chart, patient with suspected allergy to salmon (patient claims salmon makes asthma worse); no new food allergies noted at this time. During conversation today, patient is amenable to having Glucerna shake, which had been ordered in [facility] prior to her [facility] ED transfer. Writer encouraged po intake as tolerated. c/w Multivitamin and vitamin D3 per MD order.     Wt hx: 168lb (5/21), 162lb (9/7); current wt: 157lb (10/21) -- overall wt loss of 6.5% x past 5 months, likely related to fluctuating/inadequate po intake in settings of AMS.

## 2024-10-23 NOTE — PSYCHIATRIC REHAB INITIAL EVALUATION - NSBHPRRECOMMEND_PSY_ALL_CORE
Writer met with the patient to orient her to the psych rehab services and to establish therapeutic goals. However, patient was agitated and declined the interview. Patient was initially hospitalized due to increased symptoms of agitation, aggressive behavior, paranoia and sexually inappropriate behavior. According to the chart patient has a long history of mental illness. Patient has a history of becoming non-compliant with medications.

## 2024-10-23 NOTE — BH INPATIENT PSYCHIATRY PROGRESS NOTE - CURRENT MEDICATION
MEDICATIONS  (STANDING):  cholecalciferol 1000 Unit(s) Oral daily  divalproex Sprinkle 1000 milliGRAM(s) Oral two times a day  glucagon  Injectable 1 milliGRAM(s) IntraMuscular once  insulin lispro (ADMELOG) corrective regimen sliding scale   SubCutaneous at bedtime  insulin lispro (ADMELOG) corrective regimen sliding scale   SubCutaneous three times a day before meals  levothyroxine 75 MICROGram(s) Oral daily  melatonin. 6 milliGRAM(s) Oral at bedtime  metoprolol succinate ER 50 milliGRAM(s) Oral daily  multivitamin 1 Tablet(s) Oral daily  QUEtiapine 75 milliGRAM(s) Oral two times a day  senna 2 Tablet(s) Oral at bedtime    MEDICATIONS  (PRN):  acetaminophen     Tablet .. 650 milliGRAM(s) Oral every 6 hours PRN Mild Pain (1 - 3)  albuterol    90 MICROgram(s) HFA Inhaler 2 Puff(s) Inhalation every 6 hours PRN Bronchospasm  aluminum hydroxide/magnesium hydroxide/simethicone Suspension 30 milliLiter(s) Oral every 4 hours PRN Dyspepsia  dextrose Oral Gel 15 Gram(s) Oral once PRN Blood Glucose LESS THAN 70 milliGRAM(s)/deciliter  dextrose Oral Gel 15 Gram(s) Oral once PRN hypoglycemia  OLANZapine 2.5 milliGRAM(s) Oral four times a day PRN agitation  OLANZapine Injectable 3.75 milliGRAM(s) IntraMuscular once PRN agitation

## 2024-10-24 PROCEDURE — 99232 SBSQ HOSP IP/OBS MODERATE 35: CPT

## 2024-10-24 NOTE — BH INPATIENT PSYCHIATRY PROGRESS NOTE - NSBHFUPINTERVALHXFT_PSY_A_CORE
Pt was seen and evaluated today for follow up of Schizophrenia.  Chart was reviewed and case was discussed with treatment team.  Pt has been intermittently agitated, did require a Zyprexa 2.5mg IM yesterday evening for agitation.  Refused VS this morning, refused AM meds but prior had been compliant with her medications. No overnight events reported by staff after IM prn.  Pt is visible in the dayroom today.  Pt is declining offer of SHAFER, refused yesterday.

## 2024-10-24 NOTE — BH INPATIENT PSYCHIATRY PROGRESS NOTE - CURRENT MEDICATION
MEDICATIONS  (STANDING):  cholecalciferol 1000 Unit(s) Oral daily  divalproex Sprinkle 1000 milliGRAM(s) Oral two times a day  glucagon  Injectable 1 milliGRAM(s) IntraMuscular once  insulin lispro (ADMELOG) corrective regimen sliding scale   SubCutaneous three times a day before meals  insulin lispro (ADMELOG) corrective regimen sliding scale   SubCutaneous at bedtime  levothyroxine 75 MICROGram(s) Oral daily  melatonin. 6 milliGRAM(s) Oral at bedtime  metoprolol succinate ER 50 milliGRAM(s) Oral daily  multivitamin 1 Tablet(s) Oral daily  QUEtiapine 75 milliGRAM(s) Oral two times a day  senna 2 Tablet(s) Oral at bedtime    MEDICATIONS  (PRN):  acetaminophen     Tablet .. 650 milliGRAM(s) Oral every 6 hours PRN Mild Pain (1 - 3)  albuterol    90 MICROgram(s) HFA Inhaler 2 Puff(s) Inhalation every 6 hours PRN Bronchospasm  aluminum hydroxide/magnesium hydroxide/simethicone Suspension 30 milliLiter(s) Oral every 4 hours PRN Dyspepsia  dextrose Oral Gel 15 Gram(s) Oral once PRN hypoglycemia  dextrose Oral Gel 15 Gram(s) Oral once PRN Blood Glucose LESS THAN 70 milliGRAM(s)/deciliter  OLANZapine 2.5 milliGRAM(s) Oral four times a day PRN agitation  OLANZapine Injectable 3.75 milliGRAM(s) IntraMuscular once PRN agitation  OLANZapine Injectable 2.5 milliGRAM(s) IntraMuscular once PRN Agitation

## 2024-10-24 NOTE — BH INPATIENT PSYCHIATRY PROGRESS NOTE - NSBHASSESSSUMMFT_PSY_ALL_CORE
Ms Azul is a 74 yo F w history of SCZ, Parkinson's dz, hypothyroidism, and multiple psych hospitalizations who presents for AMS. Pt transferred from [facility], where she was found to have fallen and was covered in urine. Pt has been hospitalized in [facility] with occasional stays at [facility] for medical concerns since the start of this year for psychosis. Per [facility] notes, pt appears to be awaiting transfer to [facility] hospital. Pt has been delusional at [facility] with recent delusions surrounding pregnancy. Current medications are Depakote 750 mg bid, Seroquel 50 mg bid, and Prolixin Dec 12.5 mg IM q2w (last dose 9/24).     In [facility] stroke code was negative for acute pathology. Pt refused MRI and neuro deemed it not necessary. EEG did not reveal seizure activity and toxic encephalopathy workup was also negative. Psychiatry continued Depakote 1000mg BID, Seroquel 75mg QHS and Prolixin Dec 12.5 mg IM q2w (last dose given on 10/9), next dose due 10/23/24. As per psych CL pt has been irritable, aggressive at times requiring PRN medications and restraints. Paranoid and disorganized.     On arrival to  pt continues to present paranoid delusions, mentions she was poisoned by nurses at [facility], continues to endorse delusions of pregnancy. Denies SI/HI. Requires inpatient level of care due to inability to care for herself or transition to outpatient level of care given severe episodes of agitation, aggression and disorganized behavior in the context of psychosis.      10/22 Clinical Update: Ms. Azul refused to have her vital signs taken this morning. No behavioral outbursts or aggression reported. Bloodwork reviewed and WNL. Valproic acid level within therapeutic range at 65.70. Pt is visible in the dayroom today. Seems sedated, dozing off during interview. May need Seroquel dose to be adjusted to minimize daytime sedation. Due for Prolixin dec tomorrow.    10/23: Refused VS, irritable on approach, will offer Prolixin Dec tomorrow if more agreeable.  Did take PO meds.    10/24: Episodic agitation, did require IM prn last night.  Refused SHAFER and declined AM meds.    Plan:  -Admit to 2s under C legal status  -Routine checks  -Bed block  -Continue Depakote 1000mg BID  -Continue Seroquel 75mg QHS  -Prolixin Dec 12.5mg last received 10/9/24. Next dose due 10/23/24  -Neuro workup at [facility] negative for seizure activity or acute stroke

## 2024-10-24 NOTE — BH INPATIENT PSYCHIATRY PROGRESS NOTE - NSBHMETABOLIC_PSY_ALL_CORE_FT
BMI: BMI (kg/m2): 29.7 (10-21-24 @ 16:58)  HbA1c: A1C with Estimated Average Glucose Result: 7.3 % (09-29-24 @ 17:15)    Glucose: POCT Blood Glucose.: 210 mg/dL (10-23-24 @ 20:41)    BP: --Vital Signs Last 24 Hrs  T(C): --  T(F): --  HR: --  BP: --  BP(mean): --  RR: --  SpO2: --      Lipid Panel: Date/Time: 09-30-24 @ 04:45  Cholesterol, Serum: 148  LDL Cholesterol Calculated: 64  HDL Cholesterol, Serum: 56  Total Cholesterol/HDL Ration Measurement: --  Triglycerides, Serum: 138

## 2024-10-25 LAB
GLUCOSE BLDC GLUCOMTR-MCNC: 187 MG/DL — HIGH (ref 70–99)
GLUCOSE BLDC GLUCOMTR-MCNC: 232 MG/DL — HIGH (ref 70–99)

## 2024-10-25 PROCEDURE — 99232 SBSQ HOSP IP/OBS MODERATE 35: CPT

## 2024-10-25 RX ORDER — OLANZAPINE 10 MG/1
2.5 TABLET, FILM COATED ORAL ONCE
Refills: 0 | Status: COMPLETED | OUTPATIENT
Start: 2024-10-25 | End: 2024-10-25

## 2024-10-25 RX ADMIN — OLANZAPINE 2.5 MILLIGRAM(S): 10 TABLET, FILM COATED ORAL at 12:45

## 2024-10-25 NOTE — BH INPATIENT PSYCHIATRY PROGRESS NOTE - NSBHCHARTREVIEWVS_PSY_A_CORE FT
Vital Signs Last 24 Hrs  T(C): --  T(F): --  HR: --  BP: --  BP(mean): --  RR: --  SpO2: --     Vital Signs Last 24 Hrs  T(C): --  T(F): --  HR: --  BP: 148/86 (10-25-24 @ 14:14) (148/86 - 148/86)  BP(mean): 86 (10-25-24 @ 14:14) (86 - 86)  RR: --  SpO2: --

## 2024-10-25 NOTE — BH INPATIENT PSYCHIATRY PROGRESS NOTE - CURRENT MEDICATION
MEDICATIONS  (STANDING):  cholecalciferol 1000 Unit(s) Oral daily  divalproex Sprinkle 1000 milliGRAM(s) Oral two times a day  glucagon  Injectable 1 milliGRAM(s) IntraMuscular once  insulin lispro (ADMELOG) corrective regimen sliding scale   SubCutaneous three times a day before meals  insulin lispro (ADMELOG) corrective regimen sliding scale   SubCutaneous at bedtime  levothyroxine 75 MICROGram(s) Oral daily  melatonin. 6 milliGRAM(s) Oral at bedtime  metoprolol succinate ER 50 milliGRAM(s) Oral daily  multivitamin 1 Tablet(s) Oral daily  QUEtiapine 75 milliGRAM(s) Oral two times a day  senna 2 Tablet(s) Oral at bedtime    MEDICATIONS  (PRN):  acetaminophen     Tablet .. 650 milliGRAM(s) Oral every 6 hours PRN Mild Pain (1 - 3)  albuterol    90 MICROgram(s) HFA Inhaler 2 Puff(s) Inhalation every 6 hours PRN Bronchospasm  aluminum hydroxide/magnesium hydroxide/simethicone Suspension 30 milliLiter(s) Oral every 4 hours PRN Dyspepsia  dextrose Oral Gel 15 Gram(s) Oral once PRN hypoglycemia  dextrose Oral Gel 15 Gram(s) Oral once PRN Blood Glucose LESS THAN 70 milliGRAM(s)/deciliter  OLANZapine 2.5 milliGRAM(s) Oral four times a day PRN agitation  OLANZapine Injectable 3.75 milliGRAM(s) IntraMuscular once PRN agitation  OLANZapine Injectable 2.5 milliGRAM(s) IntraMuscular once PRN Agitation   MEDICATIONS  (STANDING):  cholecalciferol 1000 Unit(s) Oral daily  divalproex Sprinkle 1000 milliGRAM(s) Oral two times a day  glucagon  Injectable 1 milliGRAM(s) IntraMuscular once  insulin lispro (ADMELOG) corrective regimen sliding scale   SubCutaneous at bedtime  insulin lispro (ADMELOG) corrective regimen sliding scale   SubCutaneous three times a day before meals  levothyroxine 75 MICROGram(s) Oral daily  melatonin. 6 milliGRAM(s) Oral at bedtime  metoprolol succinate ER 50 milliGRAM(s) Oral daily  multivitamin 1 Tablet(s) Oral daily  QUEtiapine 75 milliGRAM(s) Oral two times a day  senna 2 Tablet(s) Oral at bedtime    MEDICATIONS  (PRN):  acetaminophen     Tablet .. 650 milliGRAM(s) Oral every 6 hours PRN Mild Pain (1 - 3)  albuterol    90 MICROgram(s) HFA Inhaler 2 Puff(s) Inhalation every 6 hours PRN Bronchospasm  aluminum hydroxide/magnesium hydroxide/simethicone Suspension 30 milliLiter(s) Oral every 4 hours PRN Dyspepsia  dextrose Oral Gel 15 Gram(s) Oral once PRN hypoglycemia  dextrose Oral Gel 15 Gram(s) Oral once PRN Blood Glucose LESS THAN 70 milliGRAM(s)/deciliter  OLANZapine 2.5 milliGRAM(s) Oral four times a day PRN agitation  OLANZapine Injectable 3.75 milliGRAM(s) IntraMuscular once PRN agitation  OLANZapine Injectable 2.5 milliGRAM(s) IntraMuscular once PRN Agitation

## 2024-10-25 NOTE — BH INPATIENT PSYCHIATRY PROGRESS NOTE - NSBHASSESSSUMMFT_PSY_ALL_CORE
Ms Azul is a 72 yo F w history of SCZ, Parkinson's dz, hypothyroidism, and multiple psych hospitalizations who presents for AMS. Pt transferred from [facility], where she was found to have fallen and was covered in urine. Pt has been hospitalized in [facility] with occasional stays at [facility] for medical concerns since the start of this year for psychosis. Per [facility] notes, pt appears to be awaiting transfer to [facility] hospital. Pt has been delusional at [facility] with recent delusions surrounding pregnancy. Current medications are Depakote 750 mg bid, Seroquel 50 mg bid, and Prolixin Dec 12.5 mg IM q2w (last dose 9/24).     In [facility] stroke code was negative for acute pathology. Pt refused MRI and neuro deemed it not necessary. EEG did not reveal seizure activity and toxic encephalopathy workup was also negative. Psychiatry continued Depakote 1000mg BID, Seroquel 75mg QHS and Prolixin Dec 12.5 mg IM q2w (last dose given on 10/9), next dose due 10/23/24. As per psych CL pt has been irritable, aggressive at times requiring PRN medications and restraints. Paranoid and disorganized.     On arrival to  pt continues to present paranoid delusions, mentions she was poisoned by nurses at [facility], continues to endorse delusions of pregnancy. Denies SI/HI. Requires inpatient level of care due to inability to care for herself or transition to outpatient level of care given severe episodes of agitation, aggression and disorganized behavior in the context of psychosis.      10/22 Clinical Update: Ms. Azul refused to have her vital signs taken this morning. No behavioral outbursts or aggression reported. Bloodwork reviewed and WNL. Valproic acid level within therapeutic range at 65.70. Pt is visible in the dayroom today. Seems sedated, dozing off during interview. May need Seroquel dose to be adjusted to minimize daytime sedation. Due for Prolixin dec tomorrow.    10/23: Refused VS, irritable on approach, will offer Prolixin Dec tomorrow if more agreeable.  Did take PO meds.    10/24: Episodic agitation, did require IM prn last night.  Refused SHAFER and declined AM meds.  10/25: Remains irritable, refusing meds and VS, declines SHAFER, will likely require MOO.    Plan:  -Admit to 2s under 2PC legal status  -Routine checks  -Bed block  -Continue Depakote 1000mg BID  -Continue Seroquel 75mg QHS  -Prolixin Dec 12.5mg last received 10/9/24. Next dose due 10/23/24  -Neuro workup at [facility] negative for seizure activity or acute stroke

## 2024-10-25 NOTE — BH INPATIENT PSYCHIATRY PROGRESS NOTE - NSBHFUPINTERVALHXFT_PSY_A_CORE
Pt was seen and evaluated today for follow up of Schizophrenia.  Chart was reviewed and case was discussed with treatment team.  Pt has been intermittently agitated, but did not require prns last night.  Refused VS this morning, refused both bedtime and AM meds but prior had been compliant with her medications. No overnight events reported by staff.  Pt is visible in the day room today.  Pt is declining offer of SHAFER, now refusing meds.  Has required meds over objection prior and will likely require again as lacks capacity, remains decompensated psychiatrically.  Pt was seen and evaluated today for follow up of Schizophrenia.  Chart was reviewed and case was discussed with treatment team.  Pt has been intermittently agitated, but did not require prns last night.  Refused VS this morning, refused both bedtime and AM meds but prior had been compliant with her medications. No overnight events reported by staff.  Pt is visible in the day room today.  Pt is declining offer of SHAFER, now refusing meds.  Has required meds over objection prior and will likely require again as lacks capacity, remains decompensated psychiatrically.     Pt got agitated, was intrusive with peers and was not responding to verbal redirection, required a manual hold at 12:38pm with Zyprexa 2.5mg IM given with benefit, well tolerated and no injuries or struggle noted.

## 2024-10-25 NOTE — BH INPATIENT PSYCHIATRY PROGRESS NOTE - NSBHMETABOLIC_PSY_ALL_CORE_FT
BMI: BMI (kg/m2): 29.7 (10-21-24 @ 16:58)  HbA1c: A1C with Estimated Average Glucose Result: 7.3 % (09-29-24 @ 17:15)    Glucose: POCT Blood Glucose.: 210 mg/dL (10-23-24 @ 20:41)    BP: --Vital Signs Last 24 Hrs  T(C): --  T(F): --  HR: --  BP: --  BP(mean): --  RR: --  SpO2: --      Lipid Panel: Date/Time: 09-30-24 @ 04:45  Cholesterol, Serum: 148  LDL Cholesterol Calculated: 64  HDL Cholesterol, Serum: 56  Total Cholesterol/HDL Ration Measurement: --  Triglycerides, Serum: 138   BMI: BMI (kg/m2): 29.7 (10-21-24 @ 16:58)  HbA1c: A1C with Estimated Average Glucose Result: 7.3 % (09-29-24 @ 17:15)    Glucose: POCT Blood Glucose.: 187 mg/dL (10-25-24 @ 15:37)    BP: 148/86 (10-25-24 @ 14:14) (148/86 - 148/86)Vital Signs Last 24 Hrs  T(C): --  T(F): --  HR: --  BP: 148/86 (10-25-24 @ 14:14) (148/86 - 148/86)  BP(mean): 86 (10-25-24 @ 14:14) (86 - 86)  RR: --  SpO2: --      Lipid Panel: Date/Time: 09-30-24 @ 04:45  Cholesterol, Serum: 148  LDL Cholesterol Calculated: 64  HDL Cholesterol, Serum: 56  Total Cholesterol/HDL Ration Measurement: --  Triglycerides, Serum: 138

## 2024-10-26 RX ADMIN — Medication 1000 MILLIGRAM(S): at 10:03

## 2024-10-26 RX ADMIN — Medication 1000 MILLIGRAM(S): at 22:26

## 2024-10-26 RX ADMIN — QUETIAPINE FUMARATE 75 MILLIGRAM(S): 300 TABLET ORAL at 10:03

## 2024-10-26 RX ADMIN — QUETIAPINE FUMARATE 75 MILLIGRAM(S): 300 TABLET ORAL at 22:27

## 2024-10-26 RX ADMIN — Medication 1 TABLET(S): at 10:04

## 2024-10-26 RX ADMIN — Medication 1000 UNIT(S): at 10:03

## 2024-10-26 RX ADMIN — Medication 50 MILLIGRAM(S): at 10:04

## 2024-10-27 LAB
GLUCOSE BLDC GLUCOMTR-MCNC: 158 MG/DL — HIGH (ref 70–99)
GLUCOSE BLDC GLUCOMTR-MCNC: 172 MG/DL — HIGH (ref 70–99)
GLUCOSE BLDC GLUCOMTR-MCNC: 201 MG/DL — HIGH (ref 70–99)

## 2024-10-27 RX ORDER — OLANZAPINE 10 MG/1
5 TABLET, FILM COATED ORAL ONCE
Refills: 0 | Status: COMPLETED | OUTPATIENT
Start: 2024-10-27 | End: 2024-10-27

## 2024-10-27 RX ORDER — OLANZAPINE 10 MG/1
3.75 TABLET, FILM COATED ORAL ONCE
Refills: 0 | Status: DISCONTINUED | OUTPATIENT
Start: 2024-10-27 | End: 2024-10-28

## 2024-10-27 RX ADMIN — OLANZAPINE 5 MILLIGRAM(S): 10 TABLET, FILM COATED ORAL at 14:40

## 2024-10-27 RX ADMIN — OLANZAPINE 5 MILLIGRAM(S): 10 TABLET, FILM COATED ORAL at 17:20

## 2024-10-27 NOTE — BH CHART NOTE - NSEVENTNOTEFT_PSY_ALL_CORE
Reina Azul: SAUL paged at 14:25 for pt agitation. Per nursing report, pt agitation and climbing over the pantry. Pt refusing PO. Per nursing report, Zyprexa 2.5 mg IM orders has been used previously though with minimal effects. Ordered Zyprexa 5 mg IM STAT x1 for agitation 2/2 psychosis.  Reina Azul: SAUL paged at 14:25 for pt agitation. Per nursing report, pt agitation and climbing over the pantry. Pt refusing PO. Psych emergency subsequently called at ~14:40. Per nursing report, Zyprexa 2.5 mg IM orders has been used previously though with minimal effects. Ordered Zyprexa 5 mg IM STAT x1 for agitation 2/2 psychosis.  Reina Azul: SAUL paged at 14:25 for pt agitation. Per nursing report, pt agitation and climbing over the pantry. Pt refusing PO. Psych emergency subsequently called at ~14:40. Per nursing report, Zyprexa 2.5 mg IM orders has been used previously though with minimal effects. Ordered Zyprexa 5 mg IM STAT x1 for agitation 2/2 psychosis.       Psych emergency called again at 1705. Zyprexa 5 mg IM stat administered again for agitation 2/2 psychosis. Reina Azul: SAUL paged at 14:25 for pt agitation. Per nursing report, pt agitation and climbing over the pantry. Pt refusing PO. Psych emergency subsequently called at ~14:40. Per nursing report, Zyprexa 2.5 mg IM orders has been used previously though with minimal effects. Ordered Zyprexa 5 mg IM STAT x1 for agitation 2/2 psychosis.       Psych emergency called again at 1705. Zyprexa 5 mg IM stat administered again for agitation 2/2 psychosis. Manual hold from 1717 to 1718 to administer IM medication.

## 2024-10-27 NOTE — BH CHART NOTE - NSEVENTNOTEFT_PSY_ALL_CORE
SAUL paged at for patient intrusiveness and verbal aggression. Per staff patient entering into peers' rooms, being verbally aggressive towards staff, unable to be redirected, refusing PO prn medications. Zyprexa 3.75mg IM x1 activated for aggression/agitation and intrusiveness in the setting of psychosis, with moderate effect. Patient seen after IM administered. Advised RN staff to notify SAUL if patient continues to be agitated.

## 2024-10-28 LAB
GLUCOSE BLDC GLUCOMTR-MCNC: 236 MG/DL — HIGH (ref 70–99)
GLUCOSE BLDC GLUCOMTR-MCNC: 251 MG/DL — HIGH (ref 70–99)

## 2024-10-28 PROCEDURE — 99232 SBSQ HOSP IP/OBS MODERATE 35: CPT

## 2024-10-28 RX ORDER — DIPHENHYDRAMINE HCL 25 MG
12.5 CAPSULE ORAL EVERY 6 HOURS
Refills: 0 | Status: DISCONTINUED | OUTPATIENT
Start: 2024-10-28 | End: 2025-01-29

## 2024-10-28 RX ORDER — FLUPHENAZINE HCL 5 MG
5 TABLET ORAL ONCE
Refills: 0 | Status: DISCONTINUED | OUTPATIENT
Start: 2024-10-28 | End: 2025-01-29

## 2024-10-28 RX ORDER — MECOBAL/LEVOMEFOLAT CA/B6 PHOS 2-3-35 MG
1 TABLET ORAL AT BEDTIME
Refills: 0 | Status: DISCONTINUED | OUTPATIENT
Start: 2024-10-28 | End: 2024-11-04

## 2024-10-28 RX ORDER — QUETIAPINE FUMARATE 300 MG/1
75 TABLET ORAL
Refills: 0 | Status: DISCONTINUED | OUTPATIENT
Start: 2024-10-28 | End: 2024-10-28

## 2024-10-28 RX ORDER — QUETIAPINE FUMARATE 300 MG/1
100 TABLET ORAL
Refills: 0 | Status: DISCONTINUED | OUTPATIENT
Start: 2024-10-28 | End: 2024-11-01

## 2024-10-28 RX ORDER — DIPHENHYDRAMINE HCL 25 MG
12.5 CAPSULE ORAL ONCE
Refills: 0 | Status: DISCONTINUED | OUTPATIENT
Start: 2024-10-28 | End: 2024-12-31

## 2024-10-28 RX ORDER — FLUPHENAZINE HCL 5 MG
5 TABLET ORAL EVERY 6 HOURS
Refills: 0 | Status: DISCONTINUED | OUTPATIENT
Start: 2024-10-28 | End: 2025-01-29

## 2024-10-28 RX ORDER — FLUPHENAZINE HCL 5 MG
5 TABLET ORAL ONCE
Refills: 0 | Status: DISCONTINUED | OUTPATIENT
Start: 2024-10-28 | End: 2024-10-28

## 2024-10-28 RX ORDER — DIPHENHYDRAMINE HCL 25 MG
12.5 CAPSULE ORAL EVERY 6 HOURS
Refills: 0 | Status: DISCONTINUED | OUTPATIENT
Start: 2024-10-28 | End: 2024-10-28

## 2024-10-28 RX ADMIN — Medication 50 MILLIGRAM(S): at 09:50

## 2024-10-28 RX ADMIN — QUETIAPINE FUMARATE 75 MILLIGRAM(S): 300 TABLET ORAL at 09:50

## 2024-10-28 RX ADMIN — Medication 1000 UNIT(S): at 09:50

## 2024-10-28 RX ADMIN — QUETIAPINE FUMARATE 100 MILLIGRAM(S): 300 TABLET ORAL at 20:08

## 2024-10-28 RX ADMIN — Medication 1000 MILLIGRAM(S): at 20:08

## 2024-10-28 RX ADMIN — Medication 1 TABLET(S): at 09:50

## 2024-10-28 RX ADMIN — Medication 2 TABLET(S): at 20:08

## 2024-10-28 RX ADMIN — ACETAMINOPHEN, DIPHENHYDRAMINE HCL, PHENYLEPHRINE HCL 6 MILLIGRAM(S): 325; 25; 5 TABLET ORAL at 20:07

## 2024-10-28 RX ADMIN — Medication 1000 MILLIGRAM(S): at 09:50

## 2024-10-28 RX ADMIN — Medication 5 MILLIGRAM(S): at 22:39

## 2024-10-28 RX ADMIN — Medication 1 TABLET(S): at 20:08

## 2024-10-28 NOTE — BH INPATIENT PSYCHIATRY PROGRESS NOTE - CURRENT MEDICATION
MEDICATIONS  (STANDING):  cholecalciferol 1000 Unit(s) Oral daily  divalproex Sprinkle 1000 milliGRAM(s) Oral two times a day  glucagon  Injectable 1 milliGRAM(s) IntraMuscular once  insulin lispro (ADMELOG) corrective regimen sliding scale   SubCutaneous three times a day before meals  insulin lispro (ADMELOG) corrective regimen sliding scale   SubCutaneous at bedtime  levothyroxine 75 MICROGram(s) Oral daily  melatonin. 6 milliGRAM(s) Oral at bedtime  metoprolol succinate ER 50 milliGRAM(s) Oral daily  multivitamin 1 Tablet(s) Oral at bedtime  QUEtiapine 100 milliGRAM(s) Oral two times a day  senna 2 Tablet(s) Oral at bedtime    MEDICATIONS  (PRN):  acetaminophen     Tablet .. 650 milliGRAM(s) Oral every 6 hours PRN Mild Pain (1 - 3)  albuterol    90 MICROgram(s) HFA Inhaler 2 Puff(s) Inhalation every 6 hours PRN Bronchospasm  aluminum hydroxide/magnesium hydroxide/simethicone Suspension 30 milliLiter(s) Oral every 4 hours PRN Dyspepsia  dextrose Oral Gel 15 Gram(s) Oral once PRN hypoglycemia  dextrose Oral Gel 15 Gram(s) Oral once PRN Blood Glucose LESS THAN 70 milliGRAM(s)/deciliter  diphenhydrAMINE Elixir 12.5 milliGRAM(s) Oral every 6 hours PRN eps prevention  diphenhydrAMINE Injectable 12.5 milliGRAM(s) IntraMuscular once PRN EPS prevention  fluPHENAZine 5 milliGRAM(s) Oral every 6 hours PRN agitation  fluPHENAZine  Injectable 5 milliGRAM(s) IntraMuscular once PRN agitation

## 2024-10-28 NOTE — BH INPATIENT PSYCHIATRY PROGRESS NOTE - NSBHASSESSSUMMFT_PSY_ALL_CORE
Ms Azul is a 72 yo F w history of SCZ, Parkinson's dz, hypothyroidism, and multiple psych hospitalizations who presents for AMS. Pt transferred from [facility], where she was found to have fallen and was covered in urine. Pt has been hospitalized in [facility] with occasional stays at [facility] for medical concerns since the start of this year for psychosis. Per [facility] notes, pt appears to be awaiting transfer to [facility] hospital. Pt has been delusional at [facility] with recent delusions surrounding pregnancy. Current medications are Depakote 750 mg bid, Seroquel 50 mg bid, and Prolixin Dec 12.5 mg IM q2w (last dose 9/24).     In [facility] stroke code was negative for acute pathology. Pt refused MRI and neuro deemed it not necessary. EEG did not reveal seizure activity and toxic encephalopathy workup was also negative. Psychiatry continued Depakote 1000mg BID, Seroquel 75mg QHS and Prolixin Dec 12.5 mg IM q2w (last dose given on 10/9), next dose due 10/23/24. As per psych CL pt has been irritable, aggressive at times requiring PRN medications and restraints. Paranoid and disorganized.     On arrival to  pt continues to present paranoid delusions, mentions she was poisoned by nurses at [facility], continues to endorse delusions of pregnancy. Denies SI/HI. Requires inpatient level of care due to inability to care for herself or transition to outpatient level of care given severe episodes of agitation, aggression and disorganized behavior in the context of psychosis.        10/22 Clinical Update: Ms. Azul refused to have her vital signs taken this morning. No behavioral outbursts or aggression reported. Bloodwork reviewed and WNL. Valproic acid level within therapeutic range at 65.70. Pt is visible in the dayroom today. Seems sedated, dozing off during interview. May need Seroquel dose to be adjusted to minimize daytime sedation. Due for Prolixin dec tomorrow.    10/23: Refused VS, irritable on approach, will offer Prolixin Dec tomorrow if more agreeable.  Did take PO meds.    10/24: Episodic agitation, did require IM prn last night.  Refused SHAFER and declined AM meds.  10/25: Remains irritable, refusing meds and VS, declines SHAFER, will likely require MOO.  10/28 Patient psychotic tenuous will increase Seroquel request transfer hearing start TOO, change prn to Prolixin Benadryl as never has had much response to olanzapine    Plan:  -Admit to 2s under 2PC legal status  -Routine checks  -Bed block  -Continue Depakote 1000mg BID  -Continue Seroquel 75mg QHS  -Prolixin Dec 12.5mg last received 10/9/24. Next dose due 10/23/24  -Neuro workup at [facility] negative for seizure activity or acute stroke

## 2024-10-28 NOTE — BH INPATIENT PSYCHIATRY PROGRESS NOTE - NSBHMETABOLIC_PSY_ALL_CORE_FT
BMI: BMI (kg/m2): 29.7 (10-21-24 @ 16:58)  HbA1c: A1C with Estimated Average Glucose Result: 7.3 % (09-29-24 @ 17:15)    Glucose: POCT Blood Glucose.: 251 mg/dL (10-28-24 @ 11:42)    BP: --Vital Signs Last 24 Hrs  T(C): --  T(F): --  HR: --  BP: --  BP(mean): --  RR: --  SpO2: --      Lipid Panel: Date/Time: 09-30-24 @ 04:45  Cholesterol, Serum: 148  LDL Cholesterol Calculated: 64  HDL Cholesterol, Serum: 56  Total Cholesterol/HDL Ration Measurement: --  Triglycerides, Serum: 138

## 2024-10-28 NOTE — BH INPATIENT PSYCHIATRY PROGRESS NOTE - NSBHFUPINTERVALHXFT_PSY_A_CORE
Pt was seen and evaluated today for follow up of Schizophrenia.  Chart was reviewed and case was discussed with treatment team.  Pt has been intermittently agitated,  required prn last night.  Refused VS this morning, refused both bedtime and AM meds but prior had been erratically compliant with her medications.  Pt is declining offer of SHAFER, now refusing meds.  Has required meds over objection prior and will likely require again as lacks capacity, remains decompensated psychiatrically.     Pt got agitated, was intrusive with peers and was not responding to verbal redirection, required a manual hold at 12:38pm with Zyprexa 2.5mg IM given with benefit, well tolerated and no injuries or struggle noted.

## 2024-10-29 LAB — GLUCOSE BLDC GLUCOMTR-MCNC: 112 MG/DL — HIGH (ref 70–99)

## 2024-10-29 PROCEDURE — 99232 SBSQ HOSP IP/OBS MODERATE 35: CPT

## 2024-10-29 RX ADMIN — Medication 1000 MILLIGRAM(S): at 20:44

## 2024-10-29 RX ADMIN — Medication 1000 MILLIGRAM(S): at 14:57

## 2024-10-29 RX ADMIN — Medication 1 TABLET(S): at 20:43

## 2024-10-29 RX ADMIN — QUETIAPINE FUMARATE 100 MILLIGRAM(S): 300 TABLET ORAL at 20:43

## 2024-10-29 RX ADMIN — QUETIAPINE FUMARATE 100 MILLIGRAM(S): 300 TABLET ORAL at 14:56

## 2024-10-29 NOTE — BH INPATIENT PSYCHIATRY PROGRESS NOTE - NSBHCHARTREVIEWINVESTIGATE_PSY_A_CORE FT
EEG: no seizures

## 2024-10-29 NOTE — BH INPATIENT PSYCHIATRY PROGRESS NOTE - NSBHMETABOLIC_PSY_ALL_CORE_FT
BMI: BMI (kg/m2): 29.7 (10-21-24 @ 16:58)  HbA1c: A1C with Estimated Average Glucose Result: 7.3 % (09-29-24 @ 17:15)    Glucose: POCT Blood Glucose.: 112 mg/dL (10-29-24 @ 11:39)    BP: --Vital Signs Last 24 Hrs  T(C): --  T(F): --  HR: --  BP: --  BP(mean): --  RR: --  SpO2: --      Lipid Panel: Date/Time: 09-30-24 @ 04:45  Cholesterol, Serum: 148  LDL Cholesterol Calculated: 64  HDL Cholesterol, Serum: 56  Total Cholesterol/HDL Ration Measurement: --  Triglycerides, Serum: 138

## 2024-10-29 NOTE — BH INPATIENT PSYCHIATRY PROGRESS NOTE - NSBHASSESSSUMMFT_PSY_ALL_CORE
Ms Azul is a 72 yo F w history of SCZ, Parkinson's dz, hypothyroidism, and multiple psych hospitalizations who presents for AMS. Pt transferred from [facility], where she was found to have fallen and was covered in urine. Pt has been hospitalized in [facility] with occasional stays at [facility] for medical concerns since the start of this year for psychosis. Per [facility] notes, pt appears to be awaiting transfer to [facility] hospital. Pt has been delusional at [facility] with recent delusions surrounding pregnancy. Current medications are Depakote 750 mg bid, Seroquel 50 mg bid, and Prolixin Dec 12.5 mg IM q2w (last dose 9/24).     In [facility] stroke code was negative for acute pathology. Pt refused MRI and neuro deemed it not necessary. EEG did not reveal seizure activity and toxic encephalopathy workup was also negative. Psychiatry continued Depakote 1000mg BID, Seroquel 75mg QHS and Prolixin Dec 12.5 mg IM q2w (last dose given on 10/9), next dose due 10/23/24. As per psych CL pt has been irritable, aggressive at times requiring PRN medications and restraints. Paranoid and disorganized.     On arrival to  pt continues to present paranoid delusions, mentions she was poisoned by nurses at [facility], continues to endorse delusions of pregnancy. Denies SI/HI. Requires inpatient level of care due to inability to care for herself or transition to outpatient level of care given severe episodes of agitation, aggression and disorganized behavior in the context of psychosis.        10/22 Clinical Update: Ms. Azul refused to have her vital signs taken this morning. No behavioral outbursts or aggression reported. Bloodwork reviewed and WNL. Valproic acid level within therapeutic range at 65.70. Pt is visible in the dayroom today. Seems sedated, dozing off during interview. May need Seroquel dose to be adjusted to minimize daytime sedation. Due for Prolixin dec tomorrow.    10/23: Refused VS, irritable on approach, will offer Prolixin Dec tomorrow if more agreeable.  Did take PO meds.    10/24: Episodic agitation, did require IM prn last night.  Refused SHAFER and declined AM meds.  10/25: Remains irritable, refusing meds and VS, declines SHAFER, will likely require MOO.  10/28 Patient psychotic tenuous will increase Seroquel request transfer hearing start TOO, change prn to Prolixin Benadryl as never has had much response to olanzapine  10/29 Somewhat calmer today possibly from prn and taking Seroquel last night but refused BP meds, and Depakote which may affect medical ocnsition encourage compliance placed MOO paperwork  Plan:  -Admit to 2s under 2PC legal status  -Routine checks  -Bed block  -Continue Depakote 1000mg BID  -Continue Seroquel 75mg QHS  -Prolixin Dec 12.5mg last received 10/9/24. Next dose due 10/23/24  -Neuro workup at [facility] negative for seizure activity or acute stroke

## 2024-10-29 NOTE — BH INPATIENT PSYCHIATRY PROGRESS NOTE - NSBHFUPINTERVALHXFT_PSY_A_CORE
Pt was seen and evaluated today for follow up of Schizophrenia.  Chart was reviewed and case was discussed with treatment team.  Pt has been intermittently agitated  Refused most of meds. Less irritable today. Received po prn last night of Prolixin

## 2024-10-30 PROCEDURE — 99232 SBSQ HOSP IP/OBS MODERATE 35: CPT

## 2024-10-30 RX ADMIN — ACETAMINOPHEN, DIPHENHYDRAMINE HCL, PHENYLEPHRINE HCL 6 MILLIGRAM(S): 325; 25; 5 TABLET ORAL at 22:16

## 2024-10-30 RX ADMIN — QUETIAPINE FUMARATE 100 MILLIGRAM(S): 300 TABLET ORAL at 22:15

## 2024-10-30 RX ADMIN — Medication 1000 MILLIGRAM(S): at 09:35

## 2024-10-30 RX ADMIN — LEVOTHYROXINE SODIUM 75 MICROGRAM(S): 25 TABLET ORAL at 07:21

## 2024-10-30 RX ADMIN — QUETIAPINE FUMARATE 100 MILLIGRAM(S): 300 TABLET ORAL at 09:34

## 2024-10-30 RX ADMIN — Medication 1000 MILLIGRAM(S): at 22:15

## 2024-10-30 RX ADMIN — Medication 1000 UNIT(S): at 09:34

## 2024-10-30 RX ADMIN — Medication 1 TABLET(S): at 22:15

## 2024-10-30 NOTE — BH CHART NOTE - NSEVENTNOTEFT_PSY_ALL_CORE
DIRECTOR OF INPATIENT PSYCHIATRY NOTE:   An appeal process was conducted today to assess this patient's potential transfer to a state hospital in the presence of the [facility]  Ms. Brittni Ambrosio, the patient, and the attending physician, Dr. Chacko. I have also evaluated the patient’s need for medication over her objection, the risks and benefits of medication, and her ability to make a reasoned decision.  This writer participated via Zoom for business on a secured [employer] platform.  	  Briefly, the patient is a 74 yo F w history of SCZ, Parkinson's dz, hypothyroidism, and multiple psych hospitalizations who presents for AMS. Pt transferred from [facility], where she was found to have fallen and was covered in urine. Pt has been hospitalized in [facility] with occasional stays at [facility] for medical concerns since the start of this year for psychosis. Per [facility] notes, pt appears to be [facility]. Pt has been delusional at [facility] with recent delusions surrounding pregnancy.     Since admission, the patient has continued to have   She has been prescribed Depakote Sprinkle, Seroquel and Prolixin decanoate along with insulin and synthroid.    She has been given multiple as needed medications for agitation.    She has not been taking medications as prescribed. She does not understand the extent of her illness.       Based on my review of the medical record and my interview with the patient today, I concur with the treatment team's recommendation that this patient requires additional hospitalization for stabilization and that a transfer to a state facility is indicated. Furthermore, it is my opinion that this patient currently experiences psychotic symptoms that are severely impacting her safety and ability to care for herself, and would likely benefit from antipsychotic medications.  Furthermore, it is my opinion that she lacks capacity to refuse antipsychotic therapy.  I have informed the patient of my decision and that unless she withdraws her objection to taking medications, we will make application to court for authorization to treat her over her objection. I have notified the patient and [facility] of this decision by letter.   DIRECTOR OF INPATIENT PSYCHIATRY NOTE:   An appeal process was conducted today to assess this patient's potential transfer to a state hospital in the presence of the [facility]  MsGiovana Brittni Ambrosio, the patient, and the attending physician, Dr. Chacko. I have also evaluated the patient’s need for medication over her objection, the risks and benefits of medication, and her ability to make a reasoned decision.  This writer participated via Zoom for business on a secured [employer] platform.  	  Briefly, the patient is a 74 yo F w history of SCZ, Parkinson's dz, hypothyroidism, and multiple psych hospitalizations who presents for AMS. Pt transferred from [facility], where she was found to have fallen and was covered in urine. Pt has been hospitalized in [facility] with occasional stays at [facility] for medical concerns since the start of this year for psychosis. Per [facility] notes, pt appears to be awaiting transfer to [facility] hospital. Pt has been delusional at [facility] with recent delusions surrounding pregnancy.     Since admission, the patient has continued to have paranoia toward medical staff and delusion that she is pregnant. Pt states she is afraid that medication will affect her pregnancy and that is one of the reason why she does not take medication. She also states she has no reason to require Prolixin.  She has been prescribed Depakote Sprinkle, Seroquel and Prolixin decanoate along with insulin and synthroid.    She has been given multiple as needed medications for agitation.    She has not been taking medications as prescribed. She does not understand the extent of her illness.       Based on my review of the medical record and my interview with the patient today, I concur with the treatment team's recommendation that this patient requires additional hospitalization for stabilization and that a transfer to a state facility is indicated. Furthermore, it is my opinion that this patient currently experiences psychotic symptoms that are severely impacting her safety and ability to care for herself, and would likely benefit from antipsychotic medications.  Furthermore, it is my opinion that she lacks capacity to refuse antipsychotic therapy.  I have informed the patient of my decision and that unless she withdraws her objection to taking medications, we will make application to court for authorization to treat her over her objection. I have notified the patient and [facility] of this decision by letter.

## 2024-10-30 NOTE — BH INPATIENT PSYCHIATRY PROGRESS NOTE - NSBHFUPINTERVALHXFT_PSY_A_CORE
Patient continues to refuse ADL care despite soiling herself frequently. She resists having her room cleaned despite it smelling of urine. She is disorganized and illogical. Spends many hours in the bathroom, not on the toilet. At times comes out with soap lathered into her hair. She continues to believe she is pregnant and that her psychiatrist is poisoning her. She was later found sitting in the common area, pants soaked in urine but wearing a diaper over her pants, refusing to be helped to bathroom.

## 2024-10-30 NOTE — BH INPATIENT PSYCHIATRY PROGRESS NOTE - NSBHASSESSSUMMFT_PSY_ALL_CORE
Ms Azul is a 74 yo F w history of SCZ, Parkinson's dz, hypothyroidism, and multiple psych hospitalizations who presents for AMS. Pt transferred from [facility], where she was found to have fallen and was covered in urine. Pt has been hospitalized in [facility] with occasional stays at [facility] for medical concerns since the start of this year for psychosis. Per [facility] notes, [facility] to [facility]. Pt has been delusional at [facility] with recent delusions surrounding pregnancy. Current medications are Depakote 750 mg bid, Seroquel 50 mg bid, and Prolixin Dec 12.5 mg IM q2w (last dose 9/24).     In [facility] stroke code was negative for acute pathology. Pt refused MRI and neuro deemed it not necessary. EEG did not reveal seizure activity and toxic encephalopathy workup was also negative. Psychiatry continued Depakote 1000mg BID, Seroquel 75mg QHS and Prolixin Dec 12.5 mg IM q2w (last dose given on 10/9), next dose due 10/23/24. As per psych CL pt has been irritable, aggressive at times requiring PRN medications and restraints. Paranoid and disorganized.     On arrival to  pt continues to present paranoid delusions, mentions she was poisoned by nurses at [facility], continues to endorse delusions of pregnancy. Denies SI/HI. Requires inpatient level of care due to inability to care for herself or transition to outpatient level of care given severe episodes of agitation, aggression and disorganized behavior in the context of psychosis.        10/22 Clinical Update: Ms. Azul refused to have her vital signs taken this morning. No behavioral outbursts or aggression reported. Bloodwork reviewed and WNL. Valproic acid level within therapeutic range at 65.70. Pt is visible in the dayroom today. Seems sedated, dozing off during interview. May need Seroquel dose to be adjusted to minimize daytime sedation. Due for Prolixin dec tomorrow.    10/23: Refused VS, irritable on approach, will offer Prolixin Dec tomorrow if more agreeable.  Did take PO meds.    10/24: Episodic agitation, did require IM prn last night.  Refused SHAFER and declined AM meds.  10/25: Remains irritable, refusing meds and VS, declines SHAFER, will likely require MOO.  10/28 Patient psychotic tenuous will increase Seroquel request transfer hearing start TOO, change prn to Prolixin Benadryl as never has had much response to olanzapine  10/29 Somewhat calmer today possibly from prn and taking Seroquel last night but refused BP meds, and Depakote which may affect medical ocnsition encourage compliance placed MOO paperwork  10/30 Selectively compliant with medications. Remains illogical, paranoid, uncooperative with care    Plan:  -Admit to 2s under 2PC legal status  -Routine checks  -Bed block  -Continue Depakote 1000mg BID  -Continue Seroquel 75mg QHS  -Prolixin Dec 12.5mg last received 10/9/24. Next dose due 10/23/24  -Neuro workup at [facility] negative for seizure activity or acute stroke

## 2024-10-31 LAB — GLUCOSE BLDC GLUCOMTR-MCNC: 173 MG/DL — HIGH (ref 70–99)

## 2024-10-31 PROCEDURE — 99232 SBSQ HOSP IP/OBS MODERATE 35: CPT

## 2024-10-31 RX ORDER — FLUPHENAZINE HCL 5 MG
5 TABLET ORAL ONCE
Refills: 0 | Status: COMPLETED | OUTPATIENT
Start: 2024-10-31 | End: 2024-10-31

## 2024-10-31 RX ADMIN — Medication 1 TABLET(S): at 21:14

## 2024-10-31 RX ADMIN — Medication 1000 UNIT(S): at 16:16

## 2024-10-31 RX ADMIN — Medication 5 MILLIGRAM(S): at 01:49

## 2024-10-31 RX ADMIN — Medication 2 TABLET(S): at 21:13

## 2024-10-31 RX ADMIN — Medication 50 MILLIGRAM(S): at 16:17

## 2024-10-31 RX ADMIN — QUETIAPINE FUMARATE 100 MILLIGRAM(S): 300 TABLET ORAL at 21:14

## 2024-10-31 RX ADMIN — ACETAMINOPHEN, DIPHENHYDRAMINE HCL, PHENYLEPHRINE HCL 6 MILLIGRAM(S): 325; 25; 5 TABLET ORAL at 21:13

## 2024-10-31 RX ADMIN — Medication 1000 MILLIGRAM(S): at 21:15

## 2024-10-31 RX ADMIN — Medication 1000 MILLIGRAM(S): at 16:14

## 2024-10-31 NOTE — BH INPATIENT PSYCHIATRY PROGRESS NOTE - NSBHASSESSSUMMFT_PSY_ALL_CORE
Ms Azul is a 74 yo F w history of SCZ, Parkinson's dz, hypothyroidism, and multiple psych hospitalizations who presents for AMS. Pt transferred from [facility], where she was found to have fallen and was covered in urine. Pt has been hospitalized in [facility] with occasional stays at [facility] for medical concerns since the start of this year for psychosis. Per [facility] notes, pt appears to be awaiting [facility] to [facility]. Pt has been delusional at [facility] with recent delusions surrounding pregnancy. Current medications are Depakote 750 mg bid, Seroquel 50 mg bid, and Prolixin Dec 12.5 mg IM q2w (last dose 9/24).     In [facility] stroke code was negative for acute pathology. Pt refused MRI and neuro deemed it not necessary. EEG did not reveal seizure activity and toxic encephalopathy workup was also negative. Psychiatry continued Depakote 1000mg BID, Seroquel 75mg QHS and Prolixin Dec 12.5 mg IM q2w (last dose given on 10/9), next dose due 10/23/24. As per psych CL pt has been irritable, aggressive at times requiring PRN medications and restraints. Paranoid and disorganized.     On arrival to  pt continues to present paranoid delusions, mentions she was poisoned by nurses at [facility], continues to endorse delusions of pregnancy. Denies SI/HI. Requires inpatient level of care due to inability to care for herself or transition to outpatient level of care given severe episodes of agitation, aggression and disorganized behavior in the context of psychosis.        10/22 Clinical Update: Ms. Azul refused to have her vital signs taken this morning. No behavioral outbursts or aggression reported. Bloodwork reviewed and WNL. Valproic acid level within therapeutic range at 65.70. Pt is visible in the dayroom today. Seems sedated, dozing off during interview. May need Seroquel dose to be adjusted to minimize daytime sedation. Due for Prolixin dec tomorrow.    10/23: Refused VS, irritable on approach, will offer Prolixin Dec tomorrow if more agreeable.  Did take PO meds.    10/24: Episodic agitation, did require IM prn last night.  Refused SHAFER and declined AM meds.  10/25: Remains irritable, refusing meds and VS, declines SHAFER, will likely require MOO.  10/28 Patient psychotic tenuous will increase Seroquel request transfer hearing start TOO, change prn to Prolixin Benadryl as never has had much response to olanzapine  10/29 Somewhat calmer today possibly from prn and taking Seroquel last night but refused BP meds, and Depakote which may affect medical condition encourage compliance placed MOO paperwork  10/30 Selectively compliant with medications. Remains illogical, paranoid, uncooperative with care  10/31 Psychotic disorganized needing prns taking medical meds part of time. Cont meds plan TOO hearing    Plan:  -Admit to 2s under 2PC legal status  -Routine checks  -Bed block  -Continue Depakote 1000mg BID  -Continue Seroquel 75mg QHS  -Prolixin Dec 12.5mg last received 10/9/24. Next dose due 10/23/24  -Neuro workup at [facility] negative for seizure activity or acute stroke

## 2024-10-31 NOTE — BH CHART NOTE - NSEVENTNOTEFT_PSY_ALL_CORE
Paged at [phone] due to patient being agitated, screaming at staff, unable to be redirected. Patient refused PO medication when offered. IM Fluphenazine 5mg activated for agitation secondary to psychosis.

## 2024-10-31 NOTE — BH INPATIENT PSYCHIATRY PROGRESS NOTE - NSBHFUPINTERVALHXFT_PSY_A_CORE
Patient continues to refuse ADL care  She resists having her room cleaned despite it smelling of urine. She is disorganized and illogical. Spends many hours in the bathroom, not on the toilet. At times comes out with soap lathered into her hair. She continues to believe she is pregnant and that her psychiatrist is poisoning her. She was later found sitting in the common area, pants soaked in urine but wearing a diaper over her pants, refusing to be helped to bathroom. Refuses vitals/ Was agitated and got prn last shift

## 2024-11-01 LAB — GLUCOSE BLDC GLUCOMTR-MCNC: 146 MG/DL — HIGH (ref 70–99)

## 2024-11-01 PROCEDURE — 99232 SBSQ HOSP IP/OBS MODERATE 35: CPT

## 2024-11-01 RX ORDER — QUETIAPINE FUMARATE 300 MG/1
125 TABLET ORAL AT BEDTIME
Refills: 0 | Status: DISCONTINUED | OUTPATIENT
Start: 2024-11-01 | End: 2024-11-07

## 2024-11-01 RX ORDER — QUETIAPINE FUMARATE 300 MG/1
100 TABLET ORAL DAILY
Refills: 0 | Status: DISCONTINUED | OUTPATIENT
Start: 2024-11-01 | End: 2024-11-04

## 2024-11-01 RX ADMIN — Medication 1000 UNIT(S): at 11:06

## 2024-11-01 RX ADMIN — Medication 1000 MILLIGRAM(S): at 11:06

## 2024-11-01 RX ADMIN — Medication 50 MILLIGRAM(S): at 11:06

## 2024-11-01 RX ADMIN — LEVOTHYROXINE SODIUM 75 MICROGRAM(S): 25 TABLET ORAL at 05:31

## 2024-11-01 RX ADMIN — QUETIAPINE FUMARATE 100 MILLIGRAM(S): 300 TABLET ORAL at 11:06

## 2024-11-01 NOTE — BH INPATIENT PSYCHIATRY PROGRESS NOTE - CURRENT MEDICATION
MEDICATIONS  (STANDING):  cholecalciferol 1000 Unit(s) Oral daily  divalproex Sprinkle 1000 milliGRAM(s) Oral two times a day  glucagon  Injectable 1 milliGRAM(s) IntraMuscular once  insulin lispro (ADMELOG) corrective regimen sliding scale   SubCutaneous three times a day before meals  insulin lispro (ADMELOG) corrective regimen sliding scale   SubCutaneous at bedtime  levothyroxine 75 MICROGram(s) Oral daily  melatonin. 6 milliGRAM(s) Oral at bedtime  metoprolol succinate ER 50 milliGRAM(s) Oral daily  multivitamin 1 Tablet(s) Oral at bedtime  QUEtiapine 100 milliGRAM(s) Oral daily  QUEtiapine 125 milliGRAM(s) Oral at bedtime  senna 2 Tablet(s) Oral at bedtime    MEDICATIONS  (PRN):  acetaminophen     Tablet .. 650 milliGRAM(s) Oral every 6 hours PRN Mild Pain (1 - 3)  albuterol    90 MICROgram(s) HFA Inhaler 2 Puff(s) Inhalation every 6 hours PRN Bronchospasm  aluminum hydroxide/magnesium hydroxide/simethicone Suspension 30 milliLiter(s) Oral every 4 hours PRN Dyspepsia  dextrose Oral Gel 15 Gram(s) Oral once PRN hypoglycemia  dextrose Oral Gel 15 Gram(s) Oral once PRN Blood Glucose LESS THAN 70 milliGRAM(s)/deciliter  diphenhydrAMINE Elixir 12.5 milliGRAM(s) Oral every 6 hours PRN eps prevention  diphenhydrAMINE Injectable 12.5 milliGRAM(s) IntraMuscular once PRN EPS prevention  fluPHENAZine 5 milliGRAM(s) Oral every 6 hours PRN agitation  fluPHENAZine  Injectable 5 milliGRAM(s) IntraMuscular once PRN agitation

## 2024-11-01 NOTE — BH INPATIENT PSYCHIATRY PROGRESS NOTE - NSBHMETABOLIC_PSY_ALL_CORE_FT
BMI: BMI (kg/m2): 29.7 (10-21-24 @ 16:58)  HbA1c: A1C with Estimated Average Glucose Result: 7.3 % (09-29-24 @ 17:15)    Glucose: POCT Blood Glucose.: 173 mg/dL (10-31-24 @ 20:26)    BP: 150/76 (10-31-24 @ 16:21) (150/76 - 150/76)Vital Signs Last 24 Hrs  T(C): --  T(F): --  HR: 93 (10-31-24 @ 16:21) (93 - 93)  BP: 150/76 (10-31-24 @ 16:21) (150/76 - 150/76)  BP(mean): --  RR: --  SpO2: --      Lipid Panel: Date/Time: 09-30-24 @ 04:45  Cholesterol, Serum: 148  LDL Cholesterol Calculated: 64  HDL Cholesterol, Serum: 56  Total Cholesterol/HDL Ration Measurement: --  Triglycerides, Serum: 138

## 2024-11-01 NOTE — BH INPATIENT PSYCHIATRY PROGRESS NOTE - NSBHASSESSSUMMFT_PSY_ALL_CORE
Ms Azul is a 74 yo F w history of SCZ, Parkinson's dz, hypothyroidism, and multiple psych hospitalizations who presents for AMS. Pt transferred from [facility], where she was found to have fallen and was covered in urine. Pt has been hospitalized in [facility] with occasional stays at [facility] for medical concerns since the start of this year for psychosis. Per [facility] notes, pt appears to be awaiting transfer to [facility] hospital. Pt has been delusional at [facility] with recent delusions surrounding pregnancy. Current medications are Depakote 750 mg bid, Seroquel 50 mg bid, and Prolixin Dec 12.5 mg IM q2w (last dose 9/24).     In [facility] stroke code was negative for acute pathology. Pt refused MRI and neuro deemed it not necessary. EEG did not reveal seizure activity and toxic encephalopathy workup was also negative. Psychiatry continued Depakote 1000mg BID, Seroquel 75mg QHS and Prolixin Dec 12.5 mg IM q2w (last dose given on 10/9), next dose due 10/23/24. As per psych CL pt has been irritable, aggressive at times requiring PRN medications and restraints. Paranoid and disorganized.     On arrival to  pt continues to present paranoid delusions, mentions she was poisoned by nurses at [facility], continues to endorse delusions of pregnancy. Denies SI/HI. Requires inpatient level of care due to inability to care for herself or transition to outpatient level of care given severe episodes of agitation, aggression and disorganized behavior in the context of psychosis.        10/22 Clinical Update: Ms. Azul refused to have her vital signs taken this morning. No behavioral outbursts or aggression reported. Bloodwork reviewed and WNL. Valproic acid level within therapeutic range at 65.70. Pt is visible in the dayroom today. Seems sedated, dozing off during interview. May need Seroquel dose to be adjusted to minimize daytime sedation. Due for Prolixin dec tomorrow.    10/23: Refused VS, irritable on approach, will offer Prolixin Dec tomorrow if more agreeable.  Did take PO meds.    10/24: Episodic agitation, did require IM prn last night.  Refused SHAFER and declined AM meds.  10/25: Remains irritable, refusing meds and VS, declines SHAFER, will likely require MOO.  10/28 Patient psychotic tenuous will increase Seroquel request transfer hearing start TOO, change prn to Prolixin Benadryl as never has had much response to olanzapine  10/29 Somewhat calmer today possibly from prn and taking Seroquel last night but refused BP meds, and Depakote which may affect medical condition encourage compliance placed MOO paperwork  10/30 Selectively compliant with medications. Remains illogical, paranoid, uncooperative with care  10/31 Psychotic disorganized needing prns taking medical meds part of time. Cont meds plan TOO hearing  111/1 Calmer at moment but quite psychotic, compliance with medical and psych meds very erratic Cont meds and plan for TOO hearing  Plan:  -Admit to 2s under 2PC legal status  -Routine checks  -Bed block  -Continue Depakote 1000mg BID  -Continue Seroquel 75mg QHS  -Prolixin Dec 12.5mg last received 10/9/24. Next dose due 10/23/24  -Neuro workup at [facility] negative for seizure activity or acute stroke

## 2024-11-01 NOTE — BH INPATIENT PSYCHIATRY PROGRESS NOTE - NSBHCHARTREVIEWVS_PSY_A_CORE FT
Vital Signs Last 24 Hrs  T(C): --  T(F): --  HR: 93 (10-31-24 @ 16:21) (93 - 93)  BP: 150/76 (10-31-24 @ 16:21) (150/76 - 150/76)  BP(mean): --  RR: --  SpO2: --

## 2024-11-01 NOTE — BH INPATIENT PSYCHIATRY PROGRESS NOTE - NSBHFUPINTERVALHXFT_PSY_A_CORE
Patient continues to refuse ADL care  She resists having her room cleaned despite it smelling of urine. She is disorganized and illogical. Spends many hours in the bathroom, not on the toilet. At times comes out with soap lathered into her hair. She continues to believe she is pregnant and that her psychiatrist is poisoning her. She was later found sitting in the common area, pants soaked in urine but wearing a diaper over her pants, refusing to be helped to bathroom. Had normal BP  pulse yesterday though no orthostatic check

## 2024-11-02 LAB — GLUCOSE BLDC GLUCOMTR-MCNC: 351 MG/DL — HIGH (ref 70–99)

## 2024-11-02 PROCEDURE — 99232 SBSQ HOSP IP/OBS MODERATE 35: CPT

## 2024-11-02 RX ADMIN — ACETAMINOPHEN, DIPHENHYDRAMINE HCL, PHENYLEPHRINE HCL 6 MILLIGRAM(S): 325; 25; 5 TABLET ORAL at 22:10

## 2024-11-02 RX ADMIN — Medication 2 TABLET(S): at 22:10

## 2024-11-02 RX ADMIN — Medication 1000 MILLIGRAM(S): at 22:09

## 2024-11-02 RX ADMIN — Medication 1 TABLET(S): at 22:11

## 2024-11-02 RX ADMIN — Medication 1000 MILLIGRAM(S): at 14:31

## 2024-11-02 RX ADMIN — QUETIAPINE FUMARATE 100 MILLIGRAM(S): 300 TABLET ORAL at 14:32

## 2024-11-02 RX ADMIN — Medication 1000 UNIT(S): at 14:32

## 2024-11-02 RX ADMIN — Medication 50 MILLIGRAM(S): at 14:31

## 2024-11-02 RX ADMIN — QUETIAPINE FUMARATE 125 MILLIGRAM(S): 300 TABLET ORAL at 22:10

## 2024-11-02 NOTE — BH INPATIENT PSYCHIATRY PROGRESS NOTE - NSBHFUPINTERVALHXFT_PSY_A_CORE
Refused most medications since last night. Patient continues to claim she is pregnant and that medications are poisoning her. Uncooperative ADL care.

## 2024-11-02 NOTE — BH INPATIENT PSYCHIATRY PROGRESS NOTE - CURRENT MEDICATION
MEDICATIONS  (STANDING):  cholecalciferol 1000 Unit(s) Oral daily  divalproex Sprinkle 1000 milliGRAM(s) Oral two times a day  glucagon  Injectable 1 milliGRAM(s) IntraMuscular once  insulin lispro (ADMELOG) corrective regimen sliding scale   SubCutaneous three times a day before meals  insulin lispro (ADMELOG) corrective regimen sliding scale   SubCutaneous at bedtime  levothyroxine 75 MICROGram(s) Oral daily  melatonin. 6 milliGRAM(s) Oral at bedtime  metoprolol succinate ER 50 milliGRAM(s) Oral daily  multivitamin 1 Tablet(s) Oral at bedtime  QUEtiapine 125 milliGRAM(s) Oral at bedtime  QUEtiapine 100 milliGRAM(s) Oral daily  senna 2 Tablet(s) Oral at bedtime    MEDICATIONS  (PRN):  acetaminophen     Tablet .. 650 milliGRAM(s) Oral every 6 hours PRN Mild Pain (1 - 3)  albuterol    90 MICROgram(s) HFA Inhaler 2 Puff(s) Inhalation every 6 hours PRN Bronchospasm  aluminum hydroxide/magnesium hydroxide/simethicone Suspension 30 milliLiter(s) Oral every 4 hours PRN Dyspepsia  dextrose Oral Gel 15 Gram(s) Oral once PRN hypoglycemia  dextrose Oral Gel 15 Gram(s) Oral once PRN Blood Glucose LESS THAN 70 milliGRAM(s)/deciliter  diphenhydrAMINE Elixir 12.5 milliGRAM(s) Oral every 6 hours PRN eps prevention  diphenhydrAMINE Injectable 12.5 milliGRAM(s) IntraMuscular once PRN EPS prevention  fluPHENAZine 5 milliGRAM(s) Oral every 6 hours PRN agitation  fluPHENAZine  Injectable 5 milliGRAM(s) IntraMuscular once PRN agitation

## 2024-11-02 NOTE — BH INPATIENT PSYCHIATRY PROGRESS NOTE - NSBHMETABOLIC_PSY_ALL_CORE_FT
BMI: BMI (kg/m2): 29.7 (10-21-24 @ 16:58)  HbA1c: A1C with Estimated Average Glucose Result: 7.3 % (09-29-24 @ 17:15)    Glucose: POCT Blood Glucose.: 351 mg/dL (11-02-24 @ 07:36)    BP: 128/92 (11-02-24 @ 14:32) (128/92 - 150/76)Vital Signs Last 24 Hrs  T(C): 37.4 (11-02-24 @ 14:32), Max: 37.4 (11-02-24 @ 14:32)  T(F): 99.4 (11-02-24 @ 14:32), Max: 99.4 (11-02-24 @ 14:32)  HR: 101 (11-02-24 @ 14:32) (101 - 101)  BP: 128/92 (11-02-24 @ 14:32) (128/92 - 128/92)  BP(mean): --  RR: --  SpO2: --      Lipid Panel: Date/Time: 09-30-24 @ 04:45  Cholesterol, Serum: 148  LDL Cholesterol Calculated: 64  HDL Cholesterol, Serum: 56  Total Cholesterol/HDL Ration Measurement: --  Triglycerides, Serum: 138

## 2024-11-02 NOTE — BH INPATIENT PSYCHIATRY PROGRESS NOTE - NSBHCHARTREVIEWVS_PSY_A_CORE FT
Vital Signs Last 24 Hrs  T(C): 37.4 (11-02-24 @ 14:32), Max: 37.4 (11-02-24 @ 14:32)  T(F): 99.4 (11-02-24 @ 14:32), Max: 99.4 (11-02-24 @ 14:32)  HR: 101 (11-02-24 @ 14:32) (101 - 101)  BP: 128/92 (11-02-24 @ 14:32) (128/92 - 128/92)  BP(mean): --  RR: --  SpO2: --

## 2024-11-02 NOTE — BH INPATIENT PSYCHIATRY PROGRESS NOTE - NSBHASSESSSUMMFT_PSY_ALL_CORE
Ms Azul is a 74 yo F w history of SCZ, Parkinson's dz, hypothyroidism, and multiple psych hospitalizations who presents for AMS. Pt transferred from [facility], where she was found to have fallen and was covered in urine. Pt has been hospitalized in [facility] with occasional stays at [facility] for medical concerns since the start of this year for psychosis. Per [facility] notes, pt appears to be awaiting transfer to [facility] hospital. Pt has been delusional at [facility] with recent delusions surrounding pregnancy. Current medications are Depakote 750 mg bid, Seroquel 50 mg bid, and Prolixin Dec 12.5 mg IM q2w (last dose 9/24).     In [facility] stroke code was negative for acute pathology. Pt refused MRI and neuro deemed it not necessary. EEG did not reveal seizure activity and toxic encephalopathy workup was also negative. Psychiatry continued Depakote 1000mg BID, Seroquel 75mg QHS and Prolixin Dec 12.5 mg IM q2w (last dose given on 10/9), next dose due 10/23/24. As per psych CL pt has been irritable, aggressive at times requiring PRN medications and restraints. Paranoid and disorganized.     On arrival to  pt continues to present paranoid delusions, mentions she was poisoned by nurses at [facility], continues to endorse delusions of pregnancy. Denies SI/HI. Requires inpatient level of care due to inability to care for herself or transition to outpatient level of care given severe episodes of agitation, aggression and disorganized behavior in the context of psychosis.        10/22 Clinical Update: Ms. Azul refused to have her vital signs taken this morning. No behavioral outbursts or aggression reported. Bloodwork reviewed and WNL. Valproic acid level within therapeutic range at 65.70. Pt is visible in the dayroom today. Seems sedated, dozing off during interview. May need Seroquel dose to be adjusted to minimize daytime sedation. Due for Prolixin dec tomorrow.    10/23: Refused VS, irritable on approach, will offer Prolixin Dec tomorrow if more agreeable.  Did take PO meds.    10/24: Episodic agitation, did require IM prn last night.  Refused SHAFER and declined AM meds.  10/25: Remains irritable, refusing meds and VS, declines SHAFER, will likely require MOO.  10/28 Patient psychotic tenuous will increase Seroquel request transfer hearing start TOO, change prn to Prolixin Benadryl as never has had much response to olanzapine  10/29 Somewhat calmer today possibly from prn and taking Seroquel last night but refused BP meds, and Depakote which may affect medical condition encourage compliance placed MOO paperwork  10/30 Selectively compliant with medications. Remains illogical, paranoid, uncooperative with care  10/31 Psychotic disorganized needing prns taking medical meds part of time. Cont meds plan TOO hearing  11/1 Calmer at moment but quite psychotic, compliance with medical and psych meds very erratic Cont meds and plan for TOO hearing  11/2: Noncompliant with medications, uncooperative with unit rules and ADL care.     Plan:  -Admit to 2s under 2PC legal status  -Routine checks  -Bed block  -Continue Depakote 1000mg BID  -Continue Seroquel 75mg QHS  -Prolixin Dec 12.5mg last received 10/9/24. Next dose due 10/23/24  -Neuro workup at [facility] negative for seizure activity or acute stroke

## 2024-11-03 PROCEDURE — 99232 SBSQ HOSP IP/OBS MODERATE 35: CPT

## 2024-11-03 RX ADMIN — Medication 1000 UNIT(S): at 09:55

## 2024-11-03 RX ADMIN — Medication 1000 MILLIGRAM(S): at 09:41

## 2024-11-03 NOTE — BH INPATIENT PSYCHIATRY PROGRESS NOTE - CURRENT MEDICATION
MEDICATIONS  (STANDING):  cholecalciferol 1000 Unit(s) Oral daily  divalproex Sprinkle 1000 milliGRAM(s) Oral two times a day  glucagon  Injectable 1 milliGRAM(s) IntraMuscular once  insulin lispro (ADMELOG) corrective regimen sliding scale   SubCutaneous at bedtime  insulin lispro (ADMELOG) corrective regimen sliding scale   SubCutaneous three times a day before meals  levothyroxine 75 MICROGram(s) Oral daily  melatonin. 6 milliGRAM(s) Oral at bedtime  metoprolol succinate ER 50 milliGRAM(s) Oral daily  multivitamin 1 Tablet(s) Oral at bedtime  QUEtiapine 125 milliGRAM(s) Oral at bedtime  QUEtiapine 100 milliGRAM(s) Oral daily  senna 2 Tablet(s) Oral at bedtime    MEDICATIONS  (PRN):  acetaminophen     Tablet .. 650 milliGRAM(s) Oral every 6 hours PRN Mild Pain (1 - 3)  albuterol    90 MICROgram(s) HFA Inhaler 2 Puff(s) Inhalation every 6 hours PRN Bronchospasm  aluminum hydroxide/magnesium hydroxide/simethicone Suspension 30 milliLiter(s) Oral every 4 hours PRN Dyspepsia  dextrose Oral Gel 15 Gram(s) Oral once PRN hypoglycemia  dextrose Oral Gel 15 Gram(s) Oral once PRN Blood Glucose LESS THAN 70 milliGRAM(s)/deciliter  diphenhydrAMINE Elixir 12.5 milliGRAM(s) Oral every 6 hours PRN eps prevention  diphenhydrAMINE Injectable 12.5 milliGRAM(s) IntraMuscular once PRN EPS prevention  fluPHENAZine 5 milliGRAM(s) Oral every 6 hours PRN agitation  fluPHENAZine  Injectable 5 milliGRAM(s) IntraMuscular once PRN agitation

## 2024-11-03 NOTE — BH INPATIENT PSYCHIATRY PROGRESS NOTE - NSBHASSESSSUMMFT_PSY_ALL_CORE
Ms Azul is a 72 yo F w history of SCZ, Parkinson's dz, hypothyroidism, and multiple psych hospitalizations who presents for AMS. Pt transferred from [facility], where she was found to have fallen and was covered in urine. Pt has been hospitalized in [facility] with occasional stays at [facility] for medical concerns since the start of this year for psychosis. Per [facility] notes, pt appears to be awaiting transfer to [facility] hospital. Pt has been delusional at [facility] with recent delusions surrounding pregnancy. Current medications are Depakote 750 mg bid, Seroquel 50 mg bid, and Prolixin Dec 12.5 mg IM q2w (last dose 9/24).     In [facility] stroke code was negative for acute pathology. Pt refused MRI and neuro deemed it not necessary. EEG did not reveal seizure activity and toxic encephalopathy workup was also negative. Psychiatry continued Depakote 1000mg BID, Seroquel 75mg QHS and Prolixin Dec 12.5 mg IM q2w (last dose given on 10/9), next dose due 10/23/24. As per psych CL pt has been irritable, aggressive at times requiring PRN medications and restraints. Paranoid and disorganized.     On arrival to  pt continues to present paranoid delusions, mentions she was poisoned by nurses at [facility], continues to endorse delusions of pregnancy. Denies SI/HI. Requires inpatient level of care due to inability to care for herself or transition to outpatient level of care given severe episodes of agitation, aggression and disorganized behavior in the context of psychosis.        10/22 Clinical Update: Ms. Azul refused to have her vital signs taken this morning. No behavioral outbursts or aggression reported. Bloodwork reviewed and WNL. Valproic acid level within therapeutic range at 65.70. Pt is visible in the dayroom today. Seems sedated, dozing off during interview. May need Seroquel dose to be adjusted to minimize daytime sedation. Due for Prolixin dec tomorrow.    10/23: Refused VS, irritable on approach, will offer Prolixin Dec tomorrow if more agreeable.  Did take PO meds.    10/24: Episodic agitation, did require IM prn last night.  Refused SHAFER and declined AM meds.  10/25: Remains irritable, refusing meds and VS, declines SHAFER, will likely require MOO.  10/28 Patient psychotic tenuous will increase Seroquel request transfer hearing start TOO, change prn to Prolixin Benadryl as never has had much response to olanzapine  10/29 Somewhat calmer today possibly from prn and taking Seroquel last night but refused BP meds, and Depakote which may affect medical condition encourage compliance placed MOO paperwork  10/30 Selectively compliant with medications. Remains illogical, paranoid, uncooperative with care  10/31 Psychotic disorganized needing prns taking medical meds part of time. Cont meds plan TOO hearing  11/1 Calmer at moment but quite psychotic, compliance with medical and psych meds very erratic Cont meds and plan for TOO hearing  11/2: Noncompliant with medications, uncooperative with unit rules and ADL care.   11/3: Remains paranoid    Plan:  -Admit to 2s under 2P legal status  -Routine checks  -Bed block  -Continue Depakote 1000mg BID  -Continue Seroquel 75mg QHS  -Prolixin Dec 12.5mg last received 10/9/24. Next dose due 10/23/24  -Neuro workup at [facility] negative for seizure activity or acute stroke

## 2024-11-03 NOTE — BH INPATIENT PSYCHIATRY PROGRESS NOTE - NSBHMETABOLIC_PSY_ALL_CORE_FT
BMI: BMI (kg/m2): 29.7 (10-21-24 @ 16:58)  HbA1c: A1C with Estimated Average Glucose Result: 7.3 % (09-29-24 @ 17:15)    Glucose: POCT Blood Glucose.: 351 mg/dL (11-02-24 @ 07:36)    BP: 128/92 (11-02-24 @ 14:32) (128/92 - 128/92)Vital Signs Last 24 Hrs  T(C): --  T(F): --  HR: --  BP: --  BP(mean): --  RR: --  SpO2: --      Lipid Panel: Date/Time: 09-30-24 @ 04:45  Cholesterol, Serum: 148  LDL Cholesterol Calculated: 64  HDL Cholesterol, Serum: 56  Total Cholesterol/HDL Ration Measurement: --  Triglycerides, Serum: 138

## 2024-11-04 LAB — GLUCOSE BLDC GLUCOMTR-MCNC: 241 MG/DL — HIGH (ref 70–99)

## 2024-11-04 PROCEDURE — 99232 SBSQ HOSP IP/OBS MODERATE 35: CPT

## 2024-11-04 RX ORDER — DIPHENHYDRAMINE HCL 25 MG
12.5 CAPSULE ORAL ONCE
Refills: 0 | Status: COMPLETED | OUTPATIENT
Start: 2024-11-04 | End: 2024-11-04

## 2024-11-04 RX ORDER — FLUPHENAZINE HCL 5 MG
5 TABLET ORAL ONCE
Refills: 0 | Status: COMPLETED | OUTPATIENT
Start: 2024-11-04 | End: 2024-11-04

## 2024-11-04 RX ORDER — MECOBAL/LEVOMEFOLAT CA/B6 PHOS 2-3-35 MG
1 TABLET ORAL DAILY
Refills: 0 | Status: DISCONTINUED | OUTPATIENT
Start: 2024-11-04 | End: 2024-11-06

## 2024-11-04 RX ADMIN — Medication 1000 MILLIGRAM(S): at 11:35

## 2024-11-04 RX ADMIN — Medication 12.5 MILLIGRAM(S): at 09:18

## 2024-11-04 RX ADMIN — QUETIAPINE FUMARATE 125 MILLIGRAM(S): 300 TABLET ORAL at 21:40

## 2024-11-04 RX ADMIN — Medication 5 MILLIGRAM(S): at 09:18

## 2024-11-04 RX ADMIN — Medication 1000 UNIT(S): at 11:36

## 2024-11-04 RX ADMIN — Medication 1000 MILLIGRAM(S): at 21:40

## 2024-11-04 NOTE — BH INPATIENT PSYCHIATRY PROGRESS NOTE - NSBHFUPINTERVALHXFT_PSY_A_CORE
Selectively compliant with medications. Accusatory towards staff. Disruptive tenuous, meancing trying to physically touch peers staff, needed prn. Refuses vitals

## 2024-11-04 NOTE — BH INPATIENT PSYCHIATRY PROGRESS NOTE - NSBHASSESSSUMMFT_PSY_ALL_CORE
Ms Azul is a 72 yo F w history of SCZ, Parkinson's dz, hypothyroidism, and multiple psych hospitalizations who presents for AMS. Pt transferred from [facility], where she was found to have fallen and was covered in urine. Pt has been hospitalized in [facility] with occasional stays at [facility] for medical concerns since the start of this year for psychosis. Per [facility] notes, pt appears to be awaiting transfer to [facility] hospital. Pt has been delusional at [facility] with recent delusions surrounding pregnancy. Current medications are Depakote 750 mg bid, Seroquel 50 mg bid, and Prolixin Dec 12.5 mg IM q2w (last dose 9/24).     In [facility] stroke code was negative for acute pathology. Pt refused MRI and neuro deemed it not necessary. EEG did not reveal seizure activity and toxic encephalopathy workup was also negative. Psychiatry continued Depakote 1000mg BID, Seroquel 75mg QHS and Prolixin Dec 12.5 mg IM q2w (last dose given on 10/9), next dose due 10/23/24. As per psych CL pt has been irritable, aggressive at times requiring PRN medications and restraints. Paranoid and disorganized.     On arrival to  pt continues to present paranoid delusions, mentions she was poisoned by nurses at [facility], continues to endorse delusions of pregnancy. Denies SI/HI. Requires inpatient level of care due to inability to care for herself or transition to outpatient level of care given severe episodes of agitation, aggression and disorganized behavior in the context of psychosis.        10/22 Clinical Update: Ms. Azul refused to have her vital signs taken this morning. No behavioral outbursts or aggression reported. Bloodwork reviewed and WNL. Valproic acid level within therapeutic range at 65.70. Pt is visible in the dayroom today. Seems sedated, dozing off during interview. May need Seroquel dose to be adjusted to minimize daytime sedation. Due for Prolixin dec tomorrow.    10/23: Refused VS, irritable on approach, will offer Prolixin Dec tomorrow if more agreeable.  Did take PO meds.    10/24: Episodic agitation, did require IM prn last night.  Refused SHAFER and declined AM meds.  10/25: Remains irritable, refusing meds and VS, declines SHAFER, will likely require MOO.  10/28 Patient psychotic tenuous will increase Seroquel request transfer hearing start TOO, change prn to Prolixin Benadryl as never has had much response to olanzapine  10/29 Somewhat calmer today possibly from prn and taking Seroquel last night but refused BP meds, and Depakote which may affect medical condition encourage compliance placed MOO paperwork  10/30 Selectively compliant with medications. Remains illogical, paranoid, uncooperative with care  10/31 Psychotic disorganized needing prns taking medical meds part of time. Cont meds plan TOO hearing  11/1 Calmer at moment but quite psychotic, compliance with medical and psych meds very erratic Cont meds and plan for TOO hearing  11/2: Noncompliant with medications, uncooperative with unit rules and ADL care.   11/3: Remains paranoid  11/4 Agitated tenuous poorly compliant will go to court for TOO, will move Seroquel to all PM at her request with hope this may improve compliance  Plan:  -Admit to 2s under 2PC legal status  -Routine checks  -Bed block  -Continue Depakote 1000mg BID  -Continue Seroquel 75mg QHS  -Prolixin Dec 12.5mg last received 10/9/24. Next dose due 10/23/24  -Neuro workup at [facility] negative for seizure activity or acute stroke

## 2024-11-04 NOTE — BH INPATIENT PSYCHIATRY PROGRESS NOTE - CURRENT MEDICATION
MEDICATIONS  (STANDING):  cholecalciferol 1000 Unit(s) Oral daily  divalproex Sprinkle 1000 milliGRAM(s) Oral two times a day  glucagon  Injectable 1 milliGRAM(s) IntraMuscular once  insulin lispro (ADMELOG) corrective regimen sliding scale   SubCutaneous three times a day before meals  insulin lispro (ADMELOG) corrective regimen sliding scale   SubCutaneous at bedtime  levothyroxine 75 MICROGram(s) Oral daily  melatonin. 6 milliGRAM(s) Oral at bedtime  metoprolol succinate ER 50 milliGRAM(s) Oral daily  multivitamin 1 Tablet(s) Oral at bedtime  QUEtiapine 125 milliGRAM(s) Oral at bedtime  senna 2 Tablet(s) Oral at bedtime    MEDICATIONS  (PRN):  acetaminophen     Tablet .. 650 milliGRAM(s) Oral every 6 hours PRN Mild Pain (1 - 3)  albuterol    90 MICROgram(s) HFA Inhaler 2 Puff(s) Inhalation every 6 hours PRN Bronchospasm  aluminum hydroxide/magnesium hydroxide/simethicone Suspension 30 milliLiter(s) Oral every 4 hours PRN Dyspepsia  dextrose Oral Gel 15 Gram(s) Oral once PRN hypoglycemia  dextrose Oral Gel 15 Gram(s) Oral once PRN Blood Glucose LESS THAN 70 milliGRAM(s)/deciliter  diphenhydrAMINE Elixir 12.5 milliGRAM(s) Oral every 6 hours PRN eps prevention  diphenhydrAMINE Injectable 12.5 milliGRAM(s) IntraMuscular once PRN EPS prevention  fluPHENAZine 5 milliGRAM(s) Oral every 6 hours PRN agitation  fluPHENAZine  Injectable 5 milliGRAM(s) IntraMuscular once PRN agitation

## 2024-11-05 LAB — GLUCOSE BLDC GLUCOMTR-MCNC: 137 MG/DL — HIGH (ref 70–99)

## 2024-11-05 PROCEDURE — 99232 SBSQ HOSP IP/OBS MODERATE 35: CPT

## 2024-11-05 RX ORDER — FLUPHENAZINE HCL 5 MG
18.75 TABLET ORAL ONCE
Refills: 0 | Status: DISCONTINUED | OUTPATIENT
Start: 2024-11-05 | End: 2024-11-06

## 2024-11-05 RX ADMIN — Medication 1000 MILLIGRAM(S): at 22:54

## 2024-11-05 RX ADMIN — QUETIAPINE FUMARATE 125 MILLIGRAM(S): 300 TABLET ORAL at 22:59

## 2024-11-05 NOTE — BH INPATIENT PSYCHIATRY PROGRESS NOTE - NSBHASSESSSUMMFT_PSY_ALL_CORE
Ms Azul is a 72 yo F w history of SCZ, Parkinson's dz, hypothyroidism, and multiple psych hospitalizations who presents for AMS. Pt transferred from [facility], where she was found to have fallen and was covered in urine. Pt has been hospitalized in [facility] with occasional stays at [facility] for medical concerns since the start of this year for psychosis. Per [facility] notes, pt appears to be awaiting transfer to [facility] hospital. Pt has been delusional at [facility] with recent delusions surrounding pregnancy. Current medications are Depakote 750 mg bid, Seroquel 50 mg bid, and Prolixin Dec 12.5 mg IM q2w (last dose 9/24).     In [facility] stroke code was negative for acute pathology. Pt refused MRI and neuro deemed it not necessary. EEG did not reveal seizure activity and toxic encephalopathy workup was also negative. Psychiatry continued Depakote 1000mg BID, Seroquel 75mg QHS and Prolixin Dec 12.5 mg IM q2w (last dose given on 10/9), next dose due 10/23/24. As per psych CL pt has been irritable, aggressive at times requiring PRN medications and restraints. Paranoid and disorganized.     On arrival to  pt continues to present paranoid delusions, mentions she was poisoned by nurses at [facility], continues to endorse delusions of pregnancy. Denies SI/HI. Requires inpatient level of care due to inability to care for herself or transition to outpatient level of care given severe episodes of agitation, aggression and disorganized behavior in the context of psychosis.        10/22 Clinical Update: Ms. Azul refused to have her vital signs taken this morning. No behavioral outbursts or aggression reported. Bloodwork reviewed and WNL. Valproic acid level within therapeutic range at 65.70. Pt is visible in the dayroom today. Seems sedated, dozing off during interview. May need Seroquel dose to be adjusted to minimize daytime sedation. Due for Prolixin dec tomorrow.    10/23: Refused VS, irritable on approach, will offer Prolixin Dec tomorrow if more agreeable.  Did take PO meds.    10/24: Episodic agitation, did require IM prn last night.  Refused SHAFER and declined AM meds.  10/25: Remains irritable, refusing meds and VS, declines SHAFER, will likely require MOO.  10/28 Patient psychotic tenuous will increase Seroquel request transfer hearing start TOO, change prn to Prolixin Benadryl as never has had much response to olanzapine  10/29 Somewhat calmer today possibly from prn and taking Seroquel last night but refused BP meds, and Depakote which may affect medical condition encourage compliance placed MOO paperwork  10/30 Selectively compliant with medications. Remains illogical, paranoid, uncooperative with care  10/31 Psychotic disorganized needing prns taking medical meds part of time. Cont meds plan TOO hearing  11/1 Calmer at moment but quite psychotic, compliance with medical and psych meds very erratic Cont meds and plan for TOO hearing  11/2: Noncompliant with medications, uncooperative with unit rules and ADL care.   11/3: Remains paranoid  11/4 Agitated tenuous poorly compliant will go to court for TOO, will move Seroquel to all PM at her request with hope this may improve compliance  11/5 Somewhat calmer today no prns, very psychotic, erratic compliance. Will offer decanoate today  she is due, plan court for TOO in AM.  Plan:  -Admit to 2s under [facility] legal status  -Routine checks  -Bed block  -Continue Depakote 1000mg BID  -Continue Seroquel 75mg QHS  -Prolixin Dec 12.5mg last received 10/9/24. Next dose due 10/23/24  -Neuro workup at [facility] negative for seizure activity or acute stroke

## 2024-11-05 NOTE — BH INPATIENT PSYCHIATRY PROGRESS NOTE - NSBHMETABOLIC_PSY_ALL_CORE_FT
BMI: BMI (kg/m2): 29.7 (10-21-24 @ 16:58)  HbA1c: A1C with Estimated Average Glucose Result: 7.3 % (09-29-24 @ 17:15)    Glucose: POCT Blood Glucose.: 241 mg/dL (11-04-24 @ 20:31)    BP: 128/92 (11-02-24 @ 14:32) (128/92 - 128/92)Vital Signs Last 24 Hrs  T(C): --  T(F): --  HR: --  BP: --  BP(mean): --  RR: --  SpO2: --      Lipid Panel: Date/Time: 09-30-24 @ 04:45  Cholesterol, Serum: 148  LDL Cholesterol Calculated: 64  HDL Cholesterol, Serum: 56  Total Cholesterol/HDL Ration Measurement: --  Triglycerides, Serum: 138

## 2024-11-05 NOTE — BH INPATIENT PSYCHIATRY PROGRESS NOTE - NSBHFUPINTERVALHXFT_PSY_A_CORE
Selectively compliant with medications. Accusatory towards staff. Disruptive tenuous, abusive language trying to physically touch peers staff, no prn needed today Refuses vitals, refused Anticonvulsant

## 2024-11-05 NOTE — BH INPATIENT PSYCHIATRY PROGRESS NOTE - CURRENT MEDICATION
MEDICATIONS  (STANDING):  cholecalciferol 1000 Unit(s) Oral daily  divalproex Sprinkle 1000 milliGRAM(s) Oral two times a day  fluPHENAZine decanoate Injectable, Long Acting 18.75 milliGRAM(s) IntraMuscular once  glucagon  Injectable 1 milliGRAM(s) IntraMuscular once  insulin lispro (ADMELOG) corrective regimen sliding scale   SubCutaneous three times a day before meals  insulin lispro (ADMELOG) corrective regimen sliding scale   SubCutaneous at bedtime  levothyroxine 75 MICROGram(s) Oral daily  melatonin. 6 milliGRAM(s) Oral at bedtime  metoprolol succinate ER 50 milliGRAM(s) Oral daily  multivitamin 1 Tablet(s) Oral daily  QUEtiapine 125 milliGRAM(s) Oral at bedtime  senna 2 Tablet(s) Oral at bedtime    MEDICATIONS  (PRN):  acetaminophen     Tablet .. 650 milliGRAM(s) Oral every 6 hours PRN Mild Pain (1 - 3)  albuterol    90 MICROgram(s) HFA Inhaler 2 Puff(s) Inhalation every 6 hours PRN Bronchospasm  aluminum hydroxide/magnesium hydroxide/simethicone Suspension 30 milliLiter(s) Oral every 4 hours PRN Dyspepsia  dextrose Oral Gel 15 Gram(s) Oral once PRN hypoglycemia  dextrose Oral Gel 15 Gram(s) Oral once PRN Blood Glucose LESS THAN 70 milliGRAM(s)/deciliter  diphenhydrAMINE Elixir 12.5 milliGRAM(s) Oral every 6 hours PRN eps prevention  diphenhydrAMINE Injectable 12.5 milliGRAM(s) IntraMuscular once PRN EPS prevention  fluPHENAZine 5 milliGRAM(s) Oral every 6 hours PRN agitation  fluPHENAZine  Injectable 5 milliGRAM(s) IntraMuscular once PRN agitation

## 2024-11-06 PROCEDURE — 99232 SBSQ HOSP IP/OBS MODERATE 35: CPT

## 2024-11-06 RX ORDER — FLUPHENAZINE HCL 5 MG
18.75 TABLET ORAL ONCE
Refills: 0 | Status: COMPLETED | OUTPATIENT
Start: 2024-11-06 | End: 2024-11-06

## 2024-11-06 RX ORDER — IRON/FOLIC ACID/C/B6/B12/ZINC 150-1.25MG
15 TABLET ORAL
Refills: 0 | Status: DISCONTINUED | OUTPATIENT
Start: 2024-11-06 | End: 2024-11-25

## 2024-11-06 RX ADMIN — Medication 18.75 MILLIGRAM(S): at 16:25

## 2024-11-06 RX ADMIN — Medication 1000 MILLIGRAM(S): at 22:18

## 2024-11-06 RX ADMIN — QUETIAPINE FUMARATE 125 MILLIGRAM(S): 300 TABLET ORAL at 22:18

## 2024-11-06 NOTE — BH INPATIENT PSYCHIATRY PROGRESS NOTE - NSBHFUPINTERVALHXFT_PSY_A_CORE
Selectively compliant with medications. Accusatory towards staff. Disruptive tenuous, abusive language no prn needed today Refuses vitals, did not go to court hearing. TOO granted except fro clozapine and haloperidol
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
## 2024-11-06 NOTE — BH INPATIENT PSYCHIATRY PROGRESS NOTE - CURRENT MEDICATION
MEDICATIONS  (STANDING):  cholecalciferol 1000 Unit(s) Oral daily  divalproex Sprinkle 1000 milliGRAM(s) Oral two times a day  fluPHENAZine decanoate Injectable, Long Acting 18.75 milliGRAM(s) IntraMuscular once  glucagon  Injectable 1 milliGRAM(s) IntraMuscular once  insulin lispro (ADMELOG) corrective regimen sliding scale   SubCutaneous three times a day before meals  insulin lispro (ADMELOG) corrective regimen sliding scale   SubCutaneous at bedtime  levothyroxine 75 MICROGram(s) Oral daily  melatonin. 6 milliGRAM(s) Oral at bedtime  metoprolol succinate ER 50 milliGRAM(s) Oral daily  QUEtiapine 125 milliGRAM(s) Oral at bedtime  senna 2 Tablet(s) Oral at bedtime    MEDICATIONS  (PRN):  acetaminophen     Tablet .. 650 milliGRAM(s) Oral every 6 hours PRN Mild Pain (1 - 3)  albuterol    90 MICROgram(s) HFA Inhaler 2 Puff(s) Inhalation every 6 hours PRN Bronchospasm  aluminum hydroxide/magnesium hydroxide/simethicone Suspension 30 milliLiter(s) Oral every 4 hours PRN Dyspepsia  dextrose Oral Gel 15 Gram(s) Oral once PRN Blood Glucose LESS THAN 70 milliGRAM(s)/deciliter  dextrose Oral Gel 15 Gram(s) Oral once PRN hypoglycemia  diphenhydrAMINE Elixir 12.5 milliGRAM(s) Oral every 6 hours PRN eps prevention  diphenhydrAMINE Injectable 12.5 milliGRAM(s) IntraMuscular once PRN EPS prevention  fluPHENAZine 5 milliGRAM(s) Oral every 6 hours PRN agitation  fluPHENAZine  Injectable 5 milliGRAM(s) IntraMuscular once PRN agitation

## 2024-11-06 NOTE — BH INPATIENT PSYCHIATRY PROGRESS NOTE - NSBHMETABOLIC_PSY_ALL_CORE_FT
BMI: BMI (kg/m2): 29.7 (10-21-24 @ 16:58)  HbA1c: A1C with Estimated Average Glucose Result: 7.3 % (09-29-24 @ 17:15)    Glucose: POCT Blood Glucose.: 137 mg/dL (11-05-24 @ 21:09)    BP: --Vital Signs Last 24 Hrs  T(C): --  T(F): --  HR: --  BP: --  BP(mean): --  RR: --  SpO2: --      Lipid Panel: Date/Time: 09-30-24 @ 04:45  Cholesterol, Serum: 148  LDL Cholesterol Calculated: 64  HDL Cholesterol, Serum: 56  Total Cholesterol/HDL Ration Measurement: --  Triglycerides, Serum: 138

## 2024-11-07 PROCEDURE — 99232 SBSQ HOSP IP/OBS MODERATE 35: CPT

## 2024-11-07 RX ORDER — DIPHENHYDRAMINE HCL 25 MG
12.5 CAPSULE ORAL ONCE
Refills: 0 | Status: COMPLETED | OUTPATIENT
Start: 2024-11-07 | End: 2024-11-07

## 2024-11-07 RX ORDER — FLUPHENAZINE HCL 5 MG
5 TABLET ORAL ONCE
Refills: 0 | Status: COMPLETED | OUTPATIENT
Start: 2024-11-07 | End: 2024-11-07

## 2024-11-07 RX ORDER — QUETIAPINE FUMARATE 300 MG/1
150 TABLET ORAL AT BEDTIME
Refills: 0 | Status: DISCONTINUED | OUTPATIENT
Start: 2024-11-07 | End: 2024-11-22

## 2024-11-07 RX ADMIN — QUETIAPINE FUMARATE 150 MILLIGRAM(S): 300 TABLET ORAL at 22:43

## 2024-11-07 RX ADMIN — Medication 12.5 MILLIGRAM(S): at 14:37

## 2024-11-07 RX ADMIN — ACETAMINOPHEN, DIPHENHYDRAMINE HCL, PHENYLEPHRINE HCL 6 MILLIGRAM(S): 325; 25; 5 TABLET ORAL at 22:42

## 2024-11-07 RX ADMIN — Medication 1000 MILLIGRAM(S): at 22:41

## 2024-11-07 RX ADMIN — Medication 5 MILLIGRAM(S): at 14:37

## 2024-11-07 RX ADMIN — Medication 2 TABLET(S): at 22:41

## 2024-11-07 NOTE — BH INPATIENT PSYCHIATRY PROGRESS NOTE - NSBHFUPINTERVALHXFT_PSY_A_CORE
Patient seen for SAD. No overnight events , received decanoate yesterday. Patient was covered in urine  and was assisted with shower. Patient later became agitated, scratched staff received IM fluphenazine and benadryl and calmed down

## 2024-11-07 NOTE — BH INPATIENT PSYCHIATRY PROGRESS NOTE - CURRENT MEDICATION
MEDICATIONS  (STANDING):  cholecalciferol 1000 Unit(s) Oral daily  divalproex Sprinkle 1000 milliGRAM(s) Oral two times a day  glucagon  Injectable 1 milliGRAM(s) IntraMuscular once  levothyroxine 75 MICROGram(s) Oral daily  melatonin. 6 milliGRAM(s) Oral at bedtime  metoprolol succinate ER 50 milliGRAM(s) Oral daily  multivitamin/minerals/iron Oral Solution (CENTRUM) 15 milliLiter(s) Oral <User Schedule>  QUEtiapine 150 milliGRAM(s) Oral at bedtime  senna 2 Tablet(s) Oral at bedtime    MEDICATIONS  (PRN):  acetaminophen     Tablet .. 650 milliGRAM(s) Oral every 6 hours PRN Mild Pain (1 - 3)  albuterol    90 MICROgram(s) HFA Inhaler 2 Puff(s) Inhalation every 6 hours PRN Bronchospasm  aluminum hydroxide/magnesium hydroxide/simethicone Suspension 30 milliLiter(s) Oral every 4 hours PRN Dyspepsia  dextrose Oral Gel 15 Gram(s) Oral once PRN Blood Glucose LESS THAN 70 milliGRAM(s)/deciliter  dextrose Oral Gel 15 Gram(s) Oral once PRN hypoglycemia  diphenhydrAMINE Elixir 12.5 milliGRAM(s) Oral every 6 hours PRN eps prevention  diphenhydrAMINE Injectable 12.5 milliGRAM(s) IntraMuscular once PRN EPS prevention  fluPHENAZine 5 milliGRAM(s) Oral every 6 hours PRN agitation  fluPHENAZine  Injectable 5 milliGRAM(s) IntraMuscular once PRN agitation
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
## 2024-11-08 PROCEDURE — 99232 SBSQ HOSP IP/OBS MODERATE 35: CPT

## 2024-11-08 RX ADMIN — Medication 1000 MILLIGRAM(S): at 21:25

## 2024-11-08 RX ADMIN — ACETAMINOPHEN, DIPHENHYDRAMINE HCL, PHENYLEPHRINE HCL 6 MILLIGRAM(S): 325; 25; 5 TABLET ORAL at 21:26

## 2024-11-08 RX ADMIN — Medication 2 TABLET(S): at 21:25

## 2024-11-08 RX ADMIN — Medication 1000 MILLIGRAM(S): at 13:00

## 2024-11-08 RX ADMIN — QUETIAPINE FUMARATE 150 MILLIGRAM(S): 300 TABLET ORAL at 21:26

## 2024-11-08 RX ADMIN — Medication 15 MILLILITER(S): at 13:01

## 2024-11-08 RX ADMIN — Medication 1000 UNIT(S): at 13:01
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
## 2024-11-08 NOTE — BH INPATIENT PSYCHIATRY PROGRESS NOTE - NSBHFUPINTERVALHXFT_PSY_A_CORE
Patient seen for SAD. No overnight events , received dec 11/6. Was agitated yesterday scratched staff, today calmer but refused several meds. Refused vitals. Eating, sleeping adequately. PAtient stuffing paper into shirt to appear pregnant

## 2024-11-08 NOTE — BH INPATIENT PSYCHIATRY PROGRESS NOTE - CURRENT MEDICATION
MEDICATIONS  (STANDING):  cholecalciferol 1000 Unit(s) Oral daily  divalproex Sprinkle 1000 milliGRAM(s) Oral two times a day  glucagon  Injectable 1 milliGRAM(s) IntraMuscular once  levothyroxine 75 MICROGram(s) Oral daily  melatonin. 6 milliGRAM(s) Oral at bedtime  metoprolol succinate ER 50 milliGRAM(s) Oral daily  multivitamin/minerals/iron Oral Solution (CENTRUM) 15 milliLiter(s) Oral <User Schedule>  QUEtiapine 150 milliGRAM(s) Oral at bedtime  senna 2 Tablet(s) Oral at bedtime    MEDICATIONS  (PRN):  acetaminophen     Tablet .. 650 milliGRAM(s) Oral every 6 hours PRN Mild Pain (1 - 3)  albuterol    90 MICROgram(s) HFA Inhaler 2 Puff(s) Inhalation every 6 hours PRN Bronchospasm  aluminum hydroxide/magnesium hydroxide/simethicone Suspension 30 milliLiter(s) Oral every 4 hours PRN Dyspepsia  dextrose Oral Gel 15 Gram(s) Oral once PRN hypoglycemia  dextrose Oral Gel 15 Gram(s) Oral once PRN Blood Glucose LESS THAN 70 milliGRAM(s)/deciliter  diphenhydrAMINE Elixir 12.5 milliGRAM(s) Oral every 6 hours PRN eps prevention  diphenhydrAMINE Injectable 12.5 milliGRAM(s) IntraMuscular once PRN EPS prevention  fluPHENAZine 5 milliGRAM(s) Oral every 6 hours PRN agitation  fluPHENAZine  Injectable 5 milliGRAM(s) IntraMuscular once PRN agitation

## 2024-11-09 LAB — GLUCOSE BLDC GLUCOMTR-MCNC: 178 MG/DL — HIGH (ref 70–99)

## 2024-11-09 RX ADMIN — Medication 1000 MILLIGRAM(S): at 21:30

## 2024-11-09 RX ADMIN — QUETIAPINE FUMARATE 150 MILLIGRAM(S): 300 TABLET ORAL at 21:31

## 2024-11-10 RX ADMIN — ACETAMINOPHEN, DIPHENHYDRAMINE HCL, PHENYLEPHRINE HCL 6 MILLIGRAM(S): 325; 25; 5 TABLET ORAL at 21:41

## 2024-11-10 RX ADMIN — QUETIAPINE FUMARATE 150 MILLIGRAM(S): 300 TABLET ORAL at 21:41

## 2024-11-10 RX ADMIN — Medication 1000 MILLIGRAM(S): at 21:40

## 2024-11-10 RX ADMIN — Medication 1000 MILLIGRAM(S): at 09:18

## 2024-11-10 RX ADMIN — Medication 1000 UNIT(S): at 09:19

## 2024-11-10 RX ADMIN — Medication 15 MILLILITER(S): at 12:53

## 2024-11-11 LAB — GLUCOSE BLDC GLUCOMTR-MCNC: 104 MG/DL — HIGH (ref 70–99)

## 2024-11-11 PROCEDURE — 99231 SBSQ HOSP IP/OBS SF/LOW 25: CPT

## 2024-11-11 RX ADMIN — Medication 1000 UNIT(S): at 09:30

## 2024-11-11 RX ADMIN — Medication 1000 MILLIGRAM(S): at 09:30

## 2024-11-11 RX ADMIN — Medication 15 MILLILITER(S): at 12:02

## 2024-11-11 RX ADMIN — Medication 1000 MILLIGRAM(S): at 22:09

## 2024-11-11 RX ADMIN — ACETAMINOPHEN, DIPHENHYDRAMINE HCL, PHENYLEPHRINE HCL 6 MILLIGRAM(S): 325; 25; 5 TABLET ORAL at 22:09

## 2024-11-11 RX ADMIN — QUETIAPINE FUMARATE 150 MILLIGRAM(S): 300 TABLET ORAL at 22:10

## 2024-11-11 NOTE — BH INPATIENT PSYCHIATRY PROGRESS NOTE - NSBHMETABOLIC_PSY_ALL_CORE_FT
BMI: BMI (kg/m2): 29.7 (10-21-24 @ 16:58)  HbA1c: A1C with Estimated Average Glucose Result: 7.3 % (09-29-24 @ 17:15)    Glucose: POCT Blood Glucose.: 178 mg/dL (11-09-24 @ 20:34)    BP: --Vital Signs Last 24 Hrs  T(C): --  T(F): --  HR: --  BP: --  BP(mean): --  RR: --  SpO2: --      Lipid Panel: Date/Time: 09-30-24 @ 04:45  Cholesterol, Serum: 148  LDL Cholesterol Calculated: 64  HDL Cholesterol, Serum: 56  Total Cholesterol/HDL Ration Measurement: --  Triglycerides, Serum: 138
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
## 2024-11-11 NOTE — BH INPATIENT PSYCHIATRY PROGRESS NOTE - NSBHFUPINTERVALHXFT_PSY_A_CORE
Patient seen for schizoaffective disorder. Chart reviewed and discussed with nursing staff. Pt is partially compliant with medications, no acute events overnight, no PRN needed. Vitals refused. On encounter, Pt was noted visible in dayroom and dining area. She does not answer/engage in interview on approach. Appears guarded and dismissive. She received prolixin decanoate on 11/6. No aggression/SI/HI reported.

## 2024-11-12 LAB — GLUCOSE BLDC GLUCOMTR-MCNC: 151 MG/DL — HIGH (ref 70–99)

## 2024-11-12 PROCEDURE — 99231 SBSQ HOSP IP/OBS SF/LOW 25: CPT

## 2024-11-12 RX ADMIN — ACETAMINOPHEN, DIPHENHYDRAMINE HCL, PHENYLEPHRINE HCL 6 MILLIGRAM(S): 325; 25; 5 TABLET ORAL at 22:20

## 2024-11-12 RX ADMIN — Medication 2 TABLET(S): at 22:26

## 2024-11-12 RX ADMIN — Medication 1000 MILLIGRAM(S): at 22:21

## 2024-11-12 RX ADMIN — QUETIAPINE FUMARATE 150 MILLIGRAM(S): 300 TABLET ORAL at 22:20

## 2024-11-12 NOTE — BH INPATIENT PSYCHIATRY PROGRESS NOTE - NSBHFUPINTERVALHXFT_PSY_A_CORE
Patient seen for schizoaffective disorder. Chart reviewed and discussed with nursing staff. Pt is partially compliant with medications, no acute events overnight, no PRN needed. Vitals refused. On encounter, Pt was noted visible in day room, sitting in dining area. She does not answer/engage in interview on approach. Remains disorganized, irritable, and uncooperative. Appears guarded and dismissive. She received prolixin decanoate on 11/6. No aggression/SI/HI reported. none

## 2024-11-12 NOTE — BH INPATIENT PSYCHIATRY PROGRESS NOTE - NSBHASSESSSUMMFT_PSY_ALL_CORE
Ms Azul is a 74 yo F w history of SCZ, Parkinson's dz, hypothyroidism, and multiple psych hospitalizations who presents for AMS. Pt transferred from [facility], where she was found to have fallen and was covered in urine. Pt has been hospitalized in [facility] with occasional stays at [facility] for medical concerns since the start of this year for psychosis. Per [facility] notes, pt appears to be awaiting transfer to [facility] hospital. Pt has been delusional at [facility] with recent delusions surrounding pregnancy. Current medications are Depakote 750 mg bid, Seroquel 50 mg bid, and Prolixin Dec 12.5 mg IM q2w (last dose 9/24).     In [facility] stroke code was negative for acute pathology. Pt refused MRI and neuro deemed it not necessary. EEG did not reveal seizure activity and toxic encephalopathy workup was also negative. Psychiatry continued Depakote 1000mg BID, Seroquel 75mg QHS and Prolixin Dec 12.5 mg IM q2w (last dose given on 10/9), next dose due 10/23/24. As per psych CL pt has been irritable, aggressive at times requiring PRN medications and restraints. Paranoid and disorganized.     On arrival to  pt continues to present paranoid delusions, mentions she was poisoned by nurses at [facility], continues to endorse delusions of pregnancy. Denies SI/HI. Requires inpatient level of care due to inability to care for herself or transition to outpatient level of care given severe episodes of agitation, aggression and disorganized behavior in the context of psychosis.        10/22 Clinical Update: Ms. Azul refused to have her vital signs taken this morning. No behavioral outbursts or aggression reported. Bloodwork reviewed and WNL. Valproic acid level within therapeutic range at 65.70. Pt is visible in the dayroom today. Seems sedated, dozing off during interview. May need Seroquel dose to be adjusted to minimize daytime sedation. Due for Prolixin dec tomorrow.    10/23: Refused VS, irritable on approach, will offer Prolixin Dec tomorrow if more agreeable.  Did take PO meds.    10/24: Episodic agitation, did require IM prn last night.  Refused SHAFER and declined AM meds.  10/25: Remains irritable, refusing meds and VS, declines SHAFER, will likely require MOO.  10/28 Patient psychotic tenuous will increase Seroquel request transfer hearing start TOO, change prn to Prolixin Benadryl as never has had much response to olanzapine  10/29 Somewhat calmer today possibly from prn and taking Seroquel last night but refused BP meds, and Depakote which may affect medical condition encourage compliance placed MOO paperwork  10/30 Selectively compliant with medications. Remains illogical, paranoid, uncooperative with care  10/31 Psychotic disorganized needing prns taking medical meds part of time. Cont meds plan TOO hearing  11/1 Calmer at moment but quite psychotic, compliance with medical and psych meds very erratic Cont meds and plan for TOO hearing  11/2: Noncompliant with medications, uncooperative with unit rules and ADL care.   11/3: Remains paranoid  11/4 Agitated tenuous poorly compliant will go to court for TOO, will move Seroquel to all PM at her request with hope this may improve compliance  11/5 Somewhat calmer today no prns, very psychotic, erratic compliance. Will offer decanoate today  she is due, plan court for TOO in AM.  11/6 Patient psychotic, not much change will give Prolixin  18.75mg today, Cont to titrate Seroquel but only hs at her request.   11/7 Patient irritable, aggressive delusional. Cont decanoate along with increasing Seroquel and using prns , await state transfer  11/8 Patient irritable more negativistic today do not feel though she has catatonic mutism as she is talking to other staff and peers other than writer. Cont to titrate Seroquel if she is taking regularly. If she refuses Depakote regularly may need to eval for transfer for IV anti convulsants.  11/9 guarded, easily irritable but more visible, no acute events overnight/this am, partially compliant with medical meds. No SI/HI reported.   11/12 guarded, uncooperative, disorganized and irritable, partially cooperative with meds, refuses vitals.   Plan:  -[facility] 2S under [facility] legal status  -Routine checks  -Bed block  -Continue Depakote 1000mg BID  -Continue Seroquel 150 mg QHS  -Prolixin Dec 18.75 mg received on 11/6/24.  -Neuro workup at [facility] negative for seizure activity or acute stroke

## 2024-11-12 NOTE — BH INPATIENT PSYCHIATRY PROGRESS NOTE - NSBHMETABOLIC_PSY_ALL_CORE_FT
BMI: BMI (kg/m2): 29.7 (10-21-24 @ 16:58)  HbA1c: A1C with Estimated Average Glucose Result: 7.3 % (09-29-24 @ 17:15)    Glucose: POCT Blood Glucose.: 151 mg/dL (11-12-24 @ 11:37)    BP: --Vital Signs Last 24 Hrs  T(C): --  T(F): --  HR: --  BP: --  BP(mean): --  RR: --  SpO2: --      Lipid Panel: Date/Time: 09-30-24 @ 04:45  Cholesterol, Serum: 148  LDL Cholesterol Calculated: 64  HDL Cholesterol, Serum: 56  Total Cholesterol/HDL Ration Measurement: --  Triglycerides, Serum: 138

## 2024-11-13 PROCEDURE — 99232 SBSQ HOSP IP/OBS MODERATE 35: CPT

## 2024-11-13 RX ORDER — DIPHENHYDRAMINE HCL 25 MG
12.5 CAPSULE ORAL ONCE
Refills: 0 | Status: COMPLETED | OUTPATIENT
Start: 2024-11-13 | End: 2024-11-13

## 2024-11-13 RX ORDER — FLUPHENAZINE HCL 5 MG
5 TABLET ORAL ONCE
Refills: 0 | Status: COMPLETED | OUTPATIENT
Start: 2024-11-13 | End: 2024-11-13

## 2024-11-13 RX ADMIN — Medication 1000 MILLIGRAM(S): at 10:54

## 2024-11-13 RX ADMIN — Medication 15 MILLILITER(S): at 16:49

## 2024-11-13 RX ADMIN — Medication 5 MILLIGRAM(S): at 11:08

## 2024-11-13 RX ADMIN — Medication 12.5 MILLIGRAM(S): at 11:08

## 2024-11-13 RX ADMIN — Medication 1000 UNIT(S): at 10:55

## 2024-11-13 RX ADMIN — QUETIAPINE FUMARATE 150 MILLIGRAM(S): 300 TABLET ORAL at 21:46

## 2024-11-13 RX ADMIN — Medication 1000 MILLIGRAM(S): at 21:46

## 2024-11-13 NOTE — BH INPATIENT PSYCHIATRY PROGRESS NOTE - NSBHMETABOLIC_PSY_ALL_CORE_FT
BMI: BMI (kg/m2): 29.7 (10-21-24 @ 16:58)  HbA1c: A1C with Estimated Average Glucose Result: 7.3 % (09-29-24 @ 17:15)    Glucose: POCT Blood Glucose.: 151 mg/dL (11-12-24 @ 11:37)    BP: 148/69 (11-12-24 @ 08:10) (148/69 - 148/69)Vital Signs Last 24 Hrs  T(C): --  T(F): --  HR: --  BP: --  BP(mean): --  RR: --  SpO2: --      Lipid Panel: Date/Time: 09-30-24 @ 04:45  Cholesterol, Serum: 148  LDL Cholesterol Calculated: 64  HDL Cholesterol, Serum: 56  Total Cholesterol/HDL Ration Measurement: --  Triglycerides, Serum: 138

## 2024-11-13 NOTE — BH INPATIENT PSYCHIATRY PROGRESS NOTE - NSBHASSESSSUMMFT_PSY_ALL_CORE
Ms Azul is a 72 yo F w history of SCZ, Parkinson's dz, hypothyroidism, and multiple psych hospitalizations who presents for AMS. Pt transferred from [facility], where she was found to have fallen and was covered in urine. Pt has been hospitalized in [facility] with occasional stays at [facility] for medical concerns since the start of this year for psychosis. Per [facility] notes, pt appears to be awaiting transfer to [facility] hospital. Pt has been delusional at [facility] with recent delusions surrounding pregnancy. Current medications are Depakote 750 mg bid, Seroquel 50 mg bid, and Prolixin Dec 12.5 mg IM q2w (last dose 9/24).     In [facility] stroke code was negative for acute pathology. Pt refused MRI and neuro deemed it not necessary. EEG did not reveal seizure activity and toxic encephalopathy workup was also negative. Psychiatry continued Depakote 1000mg BID, Seroquel 75mg QHS and Prolixin Dec 12.5 mg IM q2w (last dose given on 10/9), next dose due 10/23/24. As per psych CL pt has been irritable, aggressive at times requiring PRN medications and restraints. Paranoid and disorganized.     On arrival to  pt continues to present paranoid delusions, mentions she was poisoned by nurses at [facility], continues to endorse delusions of pregnancy. Denies SI/HI. Requires inpatient level of care due to inability to care for herself or transition to outpatient level of care given severe episodes of agitation, aggression and disorganized behavior in the context of psychosis.        10/22 Clinical Update: Ms. Azul refused to have her vital signs taken this morning. No behavioral outbursts or aggression reported. Bloodwork reviewed and WNL. Valproic acid level within therapeutic range at 65.70. Pt is visible in the dayroom today. Seems sedated, dozing off during interview. May need Seroquel dose to be adjusted to minimize daytime sedation. Due for Prolixin dec tomorrow.    10/23: Refused VS, irritable on approach, will offer Prolixin Dec tomorrow if more agreeable.  Did take PO meds.    10/24: Episodic agitation, did require IM prn last night.  Refused SHAFER and declined AM meds.  10/25: Remains irritable, refusing meds and VS, declines SHAFER, will likely require MOO.  10/28 Patient psychotic tenuous will increase Seroquel request transfer hearing start TOO, change prn to Prolixin Benadryl as never has had much response to olanzapine  10/29 Somewhat calmer today possibly from prn and taking Seroquel last night but refused BP meds, and Depakote which may affect medical condition encourage compliance placed MOO paperwork  10/30 Selectively compliant with medications. Remains illogical, paranoid, uncooperative with care  10/31 Psychotic disorganized needing prns taking medical meds part of time. Cont meds plan TOO hearing  11/1 Calmer at moment but quite psychotic, compliance with medical and psych meds very erratic Cont meds and plan for TOO hearing  11/2: Noncompliant with medications, uncooperative with unit rules and ADL care.   11/3: Remains paranoid  11/4 Agitated tenuous poorly compliant will go to court for TOO, will move Seroquel to all PM at her request with hope this may improve compliance  11/5 Somewhat calmer today no prns, very psychotic, erratic compliance. Will offer decanoate today  she is due, plan court for TOO in AM.  11/6 Patient psychotic, not much change will give Prolixin  18.75mg today, Cont to titrate Seroquel but only hs at her request.   11/7 Patient irritable, aggressive delusional. Cont decanoate along with increasing Seroquel and using prns , await state transfer  11/8 Patient irritable more negativistic today do not feel though she has catatonic mutism as she is talking to other staff and peers other than writer. Cont to titrate Seroquel if she is taking regularly. If she refuses Depakote regularly may need to eval for transfer for IV anti convulsants.  11/9 guarded, easily irritable but more visible, no acute events overnight/this am, partially compliant with medical meds. No SI/HI reported.   11/12 guarded, uncooperative, disorganized and irritable, partially cooperative with meds, refuses vitals.   11/13 needed PRN IM and restraints for agitation, physical aggression, calmer later on. Remains delusional, disorganized and labile, partially compliant with medications.    Plan:  -[facility] 2S under [facility]PC legal status  -Routine checks  -Bed block  -Continue Depakote 1000mg BID  -Continue Seroquel 150 mg QHS  -Prolixin Dec 18.75 mg received on 11/6/24.  -Neuro workup at [facility] negative for seizure activity or acute stroke

## 2024-11-13 NOTE — BH INPATIENT PSYCHIATRY PROGRESS NOTE - NSBHFUPINTERVALHXFT_PSY_A_CORE
Patient seen for schizoaffective disorder. Chart reviewed and discussed with nursing staff. Pt is partially compliant with medications. No overnight events.  Per sleep log, pt slept after 2 am, about 5-6 hours, eating well. Pt was noted visible in day room, sitting in dining area, had breakfast. However later on she was agitated, aggressive and threatening towards staff, unable to redirect. She required IM PRN and 4 points restraint. Vitals refused.. On exam after PRN,  she report that she is upset as 'they gave her morphine to kill her baby. She believes  'she has double uterus and they are trying to harm her by giving injection'. She appears disorganized, delusional and irritable. Denies any SI/HI at this time.

## 2024-11-14 LAB
GLUCOSE BLDC GLUCOMTR-MCNC: 126 MG/DL — HIGH (ref 70–99)
GLUCOSE BLDC GLUCOMTR-MCNC: 174 MG/DL — HIGH (ref 70–99)

## 2024-11-14 PROCEDURE — 99232 SBSQ HOSP IP/OBS MODERATE 35: CPT

## 2024-11-14 RX ADMIN — Medication 1000 UNIT(S): at 12:53

## 2024-11-14 RX ADMIN — Medication 15 MILLILITER(S): at 12:53

## 2024-11-14 RX ADMIN — QUETIAPINE FUMARATE 150 MILLIGRAM(S): 300 TABLET ORAL at 22:01

## 2024-11-14 RX ADMIN — ACETAMINOPHEN, DIPHENHYDRAMINE HCL, PHENYLEPHRINE HCL 6 MILLIGRAM(S): 325; 25; 5 TABLET ORAL at 22:01

## 2024-11-14 RX ADMIN — Medication 1000 MILLIGRAM(S): at 12:52

## 2024-11-14 RX ADMIN — Medication 1000 MILLIGRAM(S): at 22:01

## 2024-11-14 NOTE — BH TREATMENT PLAN - NSTXCOPEINTERPR_PSY_ALL_CORE
Patient over the past week did not make any major gains towards her rehab goals. Patient remains agitated, aggressive, paranoid and verbally abusive. Pateint requires constant redirection and firm limit setting. Patient continues to become disruptive during activities.     ***Patient will identify and utilize two coping skills daily to increase her frustration tolerance and decrease aggressive behavior within seven days.

## 2024-11-14 NOTE — BH TREATMENT PLAN - NSTXPSYCHOINTERRN_PSY_ALL_CORE
Assess for signs and symptoms psychosis, redirect and reorient as necessary, provide support, maintain safety, explore healthy coping skills, identify triggers, encourage pt to participate in groups and unit activities, administer and educate on ordered medications
Assess for signs and symptoms psychosis, redirect and reorient as necessary, provide support, maintain safety, explore healthy coping skills, identify triggers, encourage pt to participate in groups and unit activities, administer and educate on ordered medications

## 2024-11-14 NOTE — BH TREATMENT PLAN - NSTXDCOTHRINTERSW_PSY_ALL_CORE
SW will provide pt support and encourage her compliance with medication and care and stress the importance of maintaining behavioral control. The pt has refused to allow contact with her sister, who has been her primary support. TRISH will encourage pt to allow contact with her sister. TRISH will confer with [facility] Discharge Planning Office regarding updating the pt's status on the wait list for transfer to [place].
TRISH provides pt support, encourages compliance with medication and care, and stresses the importance of maintaining bahavioral control. TRISH maintains contact with the pt's sister, who is the pt's [facility], providing treatment updates. TRISH maintains contact with [facility] Discharge planning office regarding the pt's discharge plan of transfer to [place].

## 2024-11-14 NOTE — BH INPATIENT PSYCHIATRY PROGRESS NOTE - NSBHCHARTREVIEWVS_PSY_A_CORE FT
Vital Signs Last 24 Hrs  T(C): --  T(F): --  HR: --  BP: --  BP(mean): --  RR: --  SpO2: --    Orthostatic VS  11-13-24 @ 20:25  Lying BP: --/-- HR: --  Sitting BP: 132/55 HR: 80  Standing BP: --/-- HR: --  Site: --  Mode: --

## 2024-11-14 NOTE — BH TREATMENT PLAN - NSTXDCOTHRGOAL_PSY_ALL_CORE
The pt will be transferred to [facility] for long term inpatient treatment. The pt is on the waiting list for transfer.
The pt will be transferred to [facility] for long term inpatient treatment.

## 2024-11-14 NOTE — BH INPATIENT PSYCHIATRY PROGRESS NOTE - NSBHFUPINTERVALHXFT_PSY_A_CORE
Patient seen for schizoaffective disorder. Chart reviewed and discussed with nursing staff. Pt is only partially compliant with medications, refused am medications today, complied with seroquel last night. No overnight events, no PRN overnight/this am.  Per sleep log, pt slept from 11.2 pm, woke up 2 times, slept about 5 hours. Pt was noted visible in day room, sitting in chair, had breakfast. She makes eye contact on approach but does not engage, guarded and dismissive. No agitation/aggression, no suicidal/homicidal behavior noted this time.

## 2024-11-14 NOTE — BH INPATIENT PSYCHIATRY PROGRESS NOTE - NSBHASSESSSUMMFT_PSY_ALL_CORE
Ms Azul is a 72 yo F w history of SCZ, Parkinson's dz, hypothyroidism, and multiple psych hospitalizations who presents for AMS. Pt transferred from [facility], where she was found to have fallen and was covered in urine. Pt has been hospitalized in [facility] with occasional stays at [facility] for medical concerns since the start of this year for psychosis. Per [facility] notes, pt appears to be awaiting transfer to [facility] hospital. Pt has been delusional at [facility] with recent delusions surrounding pregnancy. Current medications are Depakote 750 mg bid, Seroquel 50 mg bid, and Prolixin Dec 12.5 mg IM q2w (last dose 9/24).     In [facility] stroke code was negative for acute pathology. Pt refused MRI and neuro deemed it not necessary. EEG did not reveal seizure activity and toxic encephalopathy workup was also negative. Psychiatry continued Depakote 1000mg BID, Seroquel 75mg QHS and Prolixin Dec 12.5 mg IM q2w (last dose given on 10/9), next dose due 10/23/24. As per psych CL pt has been irritable, aggressive at times requiring PRN medications and restraints. Paranoid and disorganized.     On arrival to  pt continues to present paranoid delusions, mentions she was poisoned by nurses at [facility], continues to endorse delusions of pregnancy. Denies SI/HI. Requires inpatient level of care due to inability to care for herself or transition to outpatient level of care given severe episodes of agitation, aggression and disorganized behavior in the context of psychosis.        10/22 Clinical Update: Ms. Azul refused to have her vital signs taken this morning. No behavioral outbursts or aggression reported. Bloodwork reviewed and WNL. Valproic acid level within therapeutic range at 65.70. Pt is visible in the dayroom today. Seems sedated, dozing off during interview. May need Seroquel dose to be adjusted to minimize daytime sedation. Due for Prolixin dec tomorrow.    10/23: Refused VS, irritable on approach, will offer Prolixin Dec tomorrow if more agreeable.  Did take PO meds.    10/24: Episodic agitation, did require IM prn last night.  Refused SHAFER and declined AM meds.  10/25: Remains irritable, refusing meds and VS, declines SHAFER, will likely require MOO.  10/28 Patient psychotic tenuous will increase Seroquel request transfer hearing start TOO, change prn to Prolixin Benadryl as never has had much response to olanzapine  10/29 Somewhat calmer today possibly from prn and taking Seroquel last night but refused BP meds, and Depakote which may affect medical condition encourage compliance placed MOO paperwork  10/30 Selectively compliant with medications. Remains illogical, paranoid, uncooperative with care  10/31 Psychotic disorganized needing prns taking medical meds part of time. Cont meds plan TOO hearing  11/1 Calmer at moment but quite psychotic, compliance with medical and psych meds very erratic Cont meds and plan for TOO hearing  11/2: Noncompliant with medications, uncooperative with unit rules and ADL care.   11/3: Remains paranoid  11/4 Agitated tenuous poorly compliant will go to court for TOO, will move Seroquel to all PM at her request with hope this may improve compliance  11/5 Somewhat calmer today no prns, very psychotic, erratic compliance. Will offer decanoate today  she is due, plan court for TOO in AM.  11/6 Patient psychotic, not much change will give Prolixin  18.75mg today, Cont to titrate Seroquel but only hs at her request.   11/7 Patient irritable, aggressive delusional. Cont decanoate along with increasing Seroquel and using prns , await state transfer  11/8 Patient irritable more negativistic today do not feel though she has catatonic mutism as she is talking to other staff and peers other than writer. Cont to titrate Seroquel if she is taking regularly. If she refuses Depakote regularly may need to eval for transfer for IV anti convulsants.  11/9 guarded, easily irritable but more visible, no acute events overnight/this am, partially compliant with medical meds. No SI/HI reported.   11/12 guarded, uncooperative, disorganized and irritable, partially cooperative with meds, refuses vitals.   11/13 needed PRN IM and restraints for agitation, physical aggression, calmer later on. Remains delusional, disorganized and labile, partially compliant with medications.  11/14 no overnight events, no PRN needed, refused am meds, complied with hs seroquel. Remains guarded, uncooperative and dismissive, no suicidal/homicidal behavior.     Plan:  -[facility] 2S under 2PC legal status  -Routine checks  -Bed block  -Continue Depakote 1000mg BID  -Continue Seroquel 150 mg QHS  -Prolixin Dec 18.75 mg received on 11/6/24.  -Neuro workup at [facility] negative for seizure activity or acute stroke

## 2024-11-14 NOTE — BH TREATMENT PLAN - NSPTSTATEDGOAL_PSY_ALL_CORE
Pt was irritable, paranoid, and unwilling to participate. 
Pt was irritable, paranoid, and unwilling to participate.

## 2024-11-14 NOTE — BH INPATIENT PSYCHIATRY PROGRESS NOTE - NSBHMETABOLIC_PSY_ALL_CORE_FT
BMI: BMI (kg/m2): 29.7 (10-21-24 @ 16:58)  HbA1c: A1C with Estimated Average Glucose Result: 7.3 % (09-29-24 @ 17:15)    Glucose: POCT Blood Glucose.: 126 mg/dL (11-14-24 @ 08:09)    BP: 148/69 (11-12-24 @ 08:10) (148/69 - 148/69)Vital Signs Last 24 Hrs  T(C): --  T(F): --  HR: --  BP: --  BP(mean): --  RR: --  SpO2: --    Orthostatic VS  11-13-24 @ 20:25  Lying BP: --/-- HR: --  Sitting BP: 132/55 HR: 80  Standing BP: --/-- HR: --  Site: --  Mode: --    Lipid Panel: Date/Time: 09-30-24 @ 04:45  Cholesterol, Serum: 148  LDL Cholesterol Calculated: 64  HDL Cholesterol, Serum: 56  Total Cholesterol/HDL Ration Measurement: --  Triglycerides, Serum: 138

## 2024-11-15 LAB — GLUCOSE BLDC GLUCOMTR-MCNC: 132 MG/DL — HIGH (ref 70–99)

## 2024-11-15 PROCEDURE — 99232 SBSQ HOSP IP/OBS MODERATE 35: CPT

## 2024-11-15 RX ADMIN — Medication 1000 MILLIGRAM(S): at 10:13

## 2024-11-15 RX ADMIN — QUETIAPINE FUMARATE 150 MILLIGRAM(S): 300 TABLET ORAL at 21:52

## 2024-11-15 RX ADMIN — Medication 1000 UNIT(S): at 10:13

## 2024-11-15 RX ADMIN — Medication 15 MILLILITER(S): at 12:13

## 2024-11-15 RX ADMIN — Medication 1000 MILLIGRAM(S): at 21:52

## 2024-11-15 NOTE — BH INPATIENT PSYCHIATRY PROGRESS NOTE - NSBHMSEMOOD_PSY_A_CORE
Irritable
Irritable/Angry
Irritable
Anxious
Irritable
Anxious
Irritable
Anxious
Unable to assess

## 2024-11-15 NOTE — BH INPATIENT PSYCHIATRY PROGRESS NOTE - NSBHMSESPEECH_PSY_A_CORE
Normal volume, rate, productivity, spontaneity and articulation
Normal volume, rate, productivity, spontaneity and articulation
Abnormal as indicated, otherwise normal...
Normal volume, rate, productivity, spontaneity and articulation
Abnormal as indicated, otherwise normal...
Normal volume, rate, productivity, spontaneity and articulation
Abnormal as indicated, otherwise normal...

## 2024-11-15 NOTE — BH INPATIENT PSYCHIATRY PROGRESS NOTE - NSBHMSETHTCONTENT_PSY_A_CORE
Patent
Delusions/Other
Delusions/Other
Other
Delusions/Other
Delusions/Other
Other
Delusions/Other
Other

## 2024-11-15 NOTE — BH INPATIENT PSYCHIATRY PROGRESS NOTE - NSBHMSEMOVE_PSY_A_CORE
Tremors/Other

## 2024-11-15 NOTE — BH INPATIENT PSYCHIATRY PROGRESS NOTE - NSBHFUPINTERVALHXFT_PSY_A_CORE
Patient seen for schizoaffective disorder. Chart reviewed and discussed with nursing staff. Pt is only partially compliant with medications, complied with seroquel last night. No overnight events, no PRN overnight/this am.  Per sleep log, pt slept poorly, 2 hours. Pt was noted visible in day room, ambulating with walker. On exam, Pt reports she was given injection other, believes, 'they are poisoning me with injection'. She reports poor sleep, and appetite is ok. Reports that seroquel does not help her and does not like prolixin.  Denies any SI/HI, hallucination. No agitation/aggression noted.

## 2024-11-15 NOTE — BH INPATIENT PSYCHIATRY PROGRESS NOTE - NSBHMSEAFFQUAL_PSY_A_CORE
Irritable

## 2024-11-15 NOTE — BH INPATIENT PSYCHIATRY PROGRESS NOTE - NSBHMSEBEHAV_PSY_A_CORE
Cooperative
Uncooperative
Cooperative
Cooperative/Uncooperative
Uncooperative
Cooperative
Cooperative
Uncooperative/Hostile
Uncooperative
Cooperative

## 2024-11-15 NOTE — BH INPATIENT PSYCHIATRY PROGRESS NOTE - NSBHMSEPERCEPT_PSY_A_CORE
No abnormalities
No abnormalities
Unable to assess
No abnormalities
No abnormalities/Unable to assess
No abnormalities
No abnormalities
Unable to assess
No abnormalities/Unable to assess
No abnormalities/Unable to assess

## 2024-11-15 NOTE — BH INPATIENT PSYCHIATRY PROGRESS NOTE - NSBHMSEGAIT_PSY_A_CORE
Abnormal gait / station
Normal gait / station
Abnormal gait / station
Normal gait / station
Abnormal gait / station
Unable to assess
Normal gait / station
Unable to assess

## 2024-11-15 NOTE — BH INPATIENT PSYCHIATRY PROGRESS NOTE - NSBHPSYCHOLCOGORIENT_PSY_A_CORE
Oriented to time, place, person, situation
Not fully oriented...
Oriented to time, place, person, situation
Oriented to time, place, person, situation
Unable to assess
Oriented to time, place, person, situation
Unable to assess

## 2024-11-15 NOTE — BH INPATIENT PSYCHIATRY PROGRESS NOTE - NSBHMSEMUSCLE_PSY_A_CORE
Normal muscle tone/strength
Unable to assess
Normal muscle tone/strength
Unable to assess
Normal muscle tone/strength

## 2024-11-15 NOTE — BH INPATIENT PSYCHIATRY PROGRESS NOTE - NSBHMSETHTPROC_PSY_A_CORE
Disorganized/Circumstantial/Impaired reasoning
Linear/Disorganized/Illogical/Impaired reasoning/Unable to assess
Unable to assess
Disorganized/Impaired reasoning
Disorganized/Illogical/Impaired reasoning
Disorganized/Circumstantial/Impaired reasoning
Disorganized/Impaired reasoning
Disorganized/Circumstantial/Impaired reasoning

## 2024-11-15 NOTE — BH INPATIENT PSYCHIATRY PROGRESS NOTE - NSBHMETABOLIC_PSY_ALL_CORE_FT
BMI: BMI (kg/m2): 29.7 (10-21-24 @ 16:58)  HbA1c: A1C with Estimated Average Glucose Result: 7.3 % (09-29-24 @ 17:15)    Glucose: POCT Blood Glucose.: 132 mg/dL (11-15-24 @ 08:13)    BP: --Vital Signs Last 24 Hrs  T(C): --  T(F): --  HR: --  BP: --  BP(mean): --  RR: --  SpO2: --    Orthostatic VS  11-13-24 @ 20:25  Lying BP: --/-- HR: --  Sitting BP: 132/55 HR: 80  Standing BP: --/-- HR: --  Site: --  Mode: --    Lipid Panel: Date/Time: 09-30-24 @ 04:45  Cholesterol, Serum: 148  LDL Cholesterol Calculated: 64  HDL Cholesterol, Serum: 56  Total Cholesterol/HDL Ration Measurement: --  Triglycerides, Serum: 138

## 2024-11-15 NOTE — BH INPATIENT PSYCHIATRY PROGRESS NOTE - NSBHMSEAFFCONG_PSY_A_CORE
Congruent
Unable to assess
Congruent

## 2024-11-16 LAB — GLUCOSE BLDC GLUCOMTR-MCNC: 185 MG/DL — HIGH (ref 70–99)

## 2024-11-16 RX ADMIN — Medication 1000 MILLIGRAM(S): at 10:08

## 2024-11-16 RX ADMIN — Medication 1000 MILLIGRAM(S): at 21:40

## 2024-11-16 RX ADMIN — Medication 1000 UNIT(S): at 10:08

## 2024-11-16 RX ADMIN — Medication 15 MILLILITER(S): at 14:14

## 2024-11-16 RX ADMIN — QUETIAPINE FUMARATE 150 MILLIGRAM(S): 300 TABLET ORAL at 21:41

## 2024-11-17 LAB
GLUCOSE BLDC GLUCOMTR-MCNC: 185 MG/DL — HIGH (ref 70–99)
GLUCOSE BLDC GLUCOMTR-MCNC: 223 MG/DL — HIGH (ref 70–99)

## 2024-11-17 RX ADMIN — Medication 1000 MILLIGRAM(S): at 21:35

## 2024-11-17 RX ADMIN — Medication 1000 MILLIGRAM(S): at 10:37

## 2024-11-17 RX ADMIN — QUETIAPINE FUMARATE 150 MILLIGRAM(S): 300 TABLET ORAL at 21:35

## 2024-11-18 PROCEDURE — 99232 SBSQ HOSP IP/OBS MODERATE 35: CPT

## 2024-11-18 RX ORDER — BISACODYL 5 MG
5 TABLET, DELAYED RELEASE (ENTERIC COATED) ORAL DAILY
Refills: 0 | Status: DISCONTINUED | OUTPATIENT
Start: 2024-11-18 | End: 2025-01-29

## 2024-11-18 RX ORDER — POLYETHYLENE GLYCOL 3350 17 G/17G
17 POWDER, FOR SOLUTION ORAL DAILY
Refills: 0 | Status: DISCONTINUED | OUTPATIENT
Start: 2024-11-18 | End: 2025-01-29

## 2024-11-18 RX ADMIN — QUETIAPINE FUMARATE 150 MILLIGRAM(S): 300 TABLET ORAL at 21:53

## 2024-11-18 RX ADMIN — ACETAMINOPHEN, DIPHENHYDRAMINE HCL, PHENYLEPHRINE HCL 6 MILLIGRAM(S): 325; 25; 5 TABLET ORAL at 21:53

## 2024-11-18 RX ADMIN — Medication 1000 UNIT(S): at 09:21

## 2024-11-18 RX ADMIN — Medication 1000 MILLIGRAM(S): at 21:52

## 2024-11-18 RX ADMIN — Medication 15 MILLILITER(S): at 12:31

## 2024-11-18 RX ADMIN — Medication 1000 MILLIGRAM(S): at 09:20

## 2024-11-18 NOTE — BH INPATIENT PSYCHIATRY PROGRESS NOTE - NSBHASSESSSUMMFT_PSY_ALL_CORE
Ms Azul is a 74 yo F w history of SCZ, Parkinson's dz, hypothyroidism, and multiple psych hospitalizations who presents for AMS. Pt transferred from [facility], where she was found to have fallen and was covered in urine. Pt has been hospitalized in [facility] with occasional stays at [facility] for medical concerns since the start of this year for psychosis. Per [facility] notes, pt appears to be awaiting transfer to [facility] hospital. Pt has been delusional at [facility] with recent delusions surrounding pregnancy. Current medications are Depakote 750 mg bid, Seroquel 50 mg bid, and Prolixin Dec 12.5 mg IM q2w (last dose 9/24).     In [facility] stroke code was negative for acute pathology. Pt refused MRI and neuro deemed it not necessary. EEG did not reveal seizure activity and toxic encephalopathy workup was also negative. Psychiatry continued Depakote 1000mg BID, Seroquel 75mg QHS and Prolixin Dec 12.5 mg IM q2w (last dose given on 10/9), next dose due 10/23/24. As per psych CL pt has been irritable, aggressive at times requiring PRN medications and restraints. Paranoid and disorganized.     On arrival to  pt continues to present paranoid delusions, mentions she was poisoned by nurses at [facility], continues to endorse delusions of pregnancy. Denies SI/HI. Requires inpatient level of care due to inability to care for herself or transition to outpatient level of care given severe episodes of agitation, aggression and disorganized behavior in the context of psychosis.        10/22 Clinical Update: Ms. Azul refused to have her vital signs taken this morning. No behavioral outbursts or aggression reported. Bloodwork reviewed and WNL. Valproic acid level within therapeutic range at 65.70. Pt is visible in the dayroom today. Seems sedated, dozing off during interview. May need Seroquel dose to be adjusted to minimize daytime sedation. Due for Prolixin dec tomorrow.    10/23: Refused VS, irritable on approach, will offer Prolixin Dec tomorrow if more agreeable.  Did take PO meds.    10/24: Episodic agitation, did require IM prn last night.  Refused SHAFER and declined AM meds.  10/25: Remains irritable, refusing meds and VS, declines SHAFER, will likely require MOO.  10/28 Patient psychotic tenuous will increase Seroquel request transfer hearing start TOO, change prn to Prolixin Benadryl as never has had much response to olanzapine  10/29 Somewhat calmer today possibly from prn and taking Seroquel last night but refused BP meds, and Depakote which may affect medical condition encourage compliance placed MOO paperwork  10/30 Selectively compliant with medications. Remains illogical, paranoid, uncooperative with care  10/31 Psychotic disorganized needing prns taking medical meds part of time. Cont meds plan TOO hearing  11/1 Calmer at moment but quite psychotic, compliance with medical and psych meds very erratic Cont meds and plan for TOO hearing  11/2: Noncompliant with medications, uncooperative with unit rules and ADL care.   11/3: Remains paranoid  11/4 Agitated tenuous poorly compliant will go to court for TOO, will move Seroquel to all PM at her request with hope this may improve compliance  11/5 Somewhat calmer today no prns, very psychotic, erratic compliance. Will offer decanoate today  she is due, plan court for TOO in AM.  11/6 Patient psychotic, not much change will give Prolixin  18.75mg today, Cont to titrate Seroquel but only hs at her request.   11/7 Patient irritable, aggressive delusional. Cont decanoate along with increasing Seroquel and using prns , await state transfer  11/8 Patient irritable more negativistic today do not feel though she has catatonic mutism as she is talking to other staff and peers other than writer. Cont to titrate Seroquel if she is taking regularly. If she refuses Depakote regularly may need to eval for transfer for IV anti convulsants.  11/9 guarded, easily irritable but more visible, no acute events overnight/this am, partially compliant with medical meds. No SI/HI reported.   11/12 guarded, uncooperative, disorganized and irritable, partially cooperative with meds, refuses vitals.   11/13 needed PRN IM and restraints for agitation, physical aggression, calmer later on. Remains delusional, disorganized and labile, partially compliant with medications.  11/14 no overnight events, no PRN needed, refused am meds, complied with hs seroquel. Remains guarded, uncooperative and dismissive, no suicidal/homicidal behavior.   11/15 no acute outbursts, no PRN needed. She does not think seroquel is helpful, only partially compliant with medications, no SI/HI. She spoke to  about abilify, to review her past medications trials.  11/18 COnt current meds inclussingf Shafer when due will re-eval Seroquel though other options limited will get ua cand if patient will allow will add prn laxatives    Plan:  -[facility] 2S under 2PC legal status  -Routine checks  -Bed block  -Continue Depakote 1000mg BID  -Continue Seroquel 150 mg QHS  -Prolixin Dec 18.75 mg received on 11/6/24.  -Neuro workup at [facility] negative for seizure activity or acute stroke

## 2024-11-18 NOTE — BH INPATIENT PSYCHIATRY PROGRESS NOTE - NSBHMETABOLIC_PSY_ALL_CORE_FT
BMI: BMI (kg/m2): 29.7 (10-21-24 @ 16:58)  HbA1c: A1C with Estimated Average Glucose Result: 7.3 % (09-29-24 @ 17:15)    Glucose: POCT Blood Glucose.: 185 mg/dL (11-17-24 @ 20:41)    BP: --Vital Signs Last 24 Hrs  T(C): --  T(F): --  HR: --  BP: --  BP(mean): --  RR: --  SpO2: --      Lipid Panel: Date/Time: 09-30-24 @ 04:45  Cholesterol, Serum: 148  LDL Cholesterol Calculated: 64  HDL Cholesterol, Serum: 56  Total Cholesterol/HDL Ration Measurement: --  Triglycerides, Serum: 138

## 2024-11-19 LAB
APPEARANCE UR: CLEAR — SIGNIFICANT CHANGE UP
BACTERIA # UR AUTO: NEGATIVE /HPF — SIGNIFICANT CHANGE UP
BILIRUB UR-MCNC: NEGATIVE — SIGNIFICANT CHANGE UP
CAST: 0 /LPF — SIGNIFICANT CHANGE UP (ref 0–4)
COLOR SPEC: YELLOW — SIGNIFICANT CHANGE UP
DIFF PNL FLD: NEGATIVE — SIGNIFICANT CHANGE UP
GLUCOSE BLDC GLUCOMTR-MCNC: 262 MG/DL — HIGH (ref 70–99)
GLUCOSE UR QL: NEGATIVE MG/DL — SIGNIFICANT CHANGE UP
KETONES UR-MCNC: NEGATIVE MG/DL — SIGNIFICANT CHANGE UP
LEUKOCYTE ESTERASE UR-ACNC: ABNORMAL
NITRITE UR-MCNC: NEGATIVE — SIGNIFICANT CHANGE UP
PH UR: 6.5 — SIGNIFICANT CHANGE UP (ref 5–8)
PROT UR-MCNC: NEGATIVE MG/DL — SIGNIFICANT CHANGE UP
RBC CASTS # UR COMP ASSIST: 1 /HPF — SIGNIFICANT CHANGE UP (ref 0–4)
SP GR SPEC: 1.01 — SIGNIFICANT CHANGE UP (ref 1–1.03)
SQUAMOUS # UR AUTO: 1 /HPF — SIGNIFICANT CHANGE UP (ref 0–5)
UROBILINOGEN FLD QL: 0.2 MG/DL — SIGNIFICANT CHANGE UP (ref 0.2–1)
WBC UR QL: 0 /HPF — SIGNIFICANT CHANGE UP (ref 0–5)

## 2024-11-19 PROCEDURE — 99232 SBSQ HOSP IP/OBS MODERATE 35: CPT

## 2024-11-19 RX ADMIN — Medication 1000 MILLIGRAM(S): at 21:42

## 2024-11-19 RX ADMIN — Medication 1000 MILLIGRAM(S): at 09:44

## 2024-11-19 RX ADMIN — QUETIAPINE FUMARATE 150 MILLIGRAM(S): 300 TABLET ORAL at 21:43

## 2024-11-19 RX ADMIN — Medication 15 MILLILITER(S): at 09:45

## 2024-11-19 NOTE — BH INPATIENT PSYCHIATRY PROGRESS NOTE - NSBHFUPINTERVALHXFT_PSY_A_CORE
Patient seen for schizoaffective disorder. Chart reviewed and discussed with nursing staff. Pt is only partially compliant with medications or takes much coaxing to comply. Patient dyshygenic clothes and room have urine smell and patient resistant to assistant with showering and washing room and clothes. Not aggressive or highly agitated, refuses vitals, eating well, sleep is only fair.

## 2024-11-19 NOTE — BH INPATIENT PSYCHIATRY PROGRESS NOTE - CURRENT MEDICATION
MEDICATIONS  (STANDING):  cholecalciferol 1000 Unit(s) Oral daily  divalproex Sprinkle 1000 milliGRAM(s) Oral two times a day  glucagon  Injectable 1 milliGRAM(s) IntraMuscular once  levothyroxine 75 MICROGram(s) Oral daily  melatonin. 6 milliGRAM(s) Oral at bedtime  metoprolol succinate ER 50 milliGRAM(s) Oral daily  multivitamin/minerals/iron Oral Solution (CENTRUM) 15 milliLiter(s) Oral <User Schedule>  QUEtiapine 150 milliGRAM(s) Oral at bedtime  senna 2 Tablet(s) Oral at bedtime    MEDICATIONS  (PRN):  acetaminophen     Tablet .. 650 milliGRAM(s) Oral every 6 hours PRN Mild Pain (1 - 3)  albuterol    90 MICROgram(s) HFA Inhaler 2 Puff(s) Inhalation every 6 hours PRN Bronchospasm  aluminum hydroxide/magnesium hydroxide/simethicone Suspension 30 milliLiter(s) Oral every 4 hours PRN Dyspepsia  bisacodyl 5 milliGRAM(s) Oral daily PRN Constipation  dextrose Oral Gel 15 Gram(s) Oral once PRN hypoglycemia  dextrose Oral Gel 15 Gram(s) Oral once PRN Blood Glucose LESS THAN 70 milliGRAM(s)/deciliter  diphenhydrAMINE Elixir 12.5 milliGRAM(s) Oral every 6 hours PRN eps prevention  diphenhydrAMINE Injectable 12.5 milliGRAM(s) IntraMuscular once PRN EPS prevention  fluPHENAZine 5 milliGRAM(s) Oral every 6 hours PRN agitation  fluPHENAZine  Injectable 5 milliGRAM(s) IntraMuscular once PRN agitation  polyethylene glycol 3350 17 Gram(s) Oral daily PRN constipation

## 2024-11-19 NOTE — BH INPATIENT PSYCHIATRY PROGRESS NOTE - NSBHASSESSSUMMFT_PSY_ALL_CORE
Ms Azul is a 74 yo F w history of SCZ, Parkinson's dz, hypothyroidism, and multiple psych hospitalizations who presents for AMS. Pt transferred from [facility], where she was found to have fallen and was covered in urine. Pt has been hospitalized in [facility] with occasional stays at [facility] for medical concerns since the start of this year for psychosis. Per [facility] notes, pt appears to be awaiting transfer to [facility] hospital. Pt has been delusional at [facility] with recent delusions surrounding pregnancy. Current medications are Depakote 750 mg bid, Seroquel 50 mg bid, and Prolixin Dec 12.5 mg IM q2w (last dose 9/24).     In [facility] stroke code was negative for acute pathology. Pt refused MRI and neuro deemed it not necessary. EEG did not reveal seizure activity and toxic encephalopathy workup was also negative. Psychiatry continued Depakote 1000mg BID, Seroquel 75mg QHS and Prolixin Dec 12.5 mg IM q2w (last dose given on 10/9), next dose due 10/23/24. As per psych CL pt has been irritable, aggressive at times requiring PRN medications and restraints. Paranoid and disorganized.     On arrival to  pt continues to present paranoid delusions, mentions she was poisoned by nurses at [facility], continues to endorse delusions of pregnancy. Denies SI/HI. Requires inpatient level of care due to inability to care for herself or transition to outpatient level of care given severe episodes of agitation, aggression and disorganized behavior in the context of psychosis.        10/22 Clinical Update: Ms. Azul refused to have her vital signs taken this morning. No behavioral outbursts or aggression reported. Bloodwork reviewed and WNL. Valproic acid level within therapeutic range at 65.70. Pt is visible in the dayroom today. Seems sedated, dozing off during interview. May need Seroquel dose to be adjusted to minimize daytime sedation. Due for Prolixin dec tomorrow.    10/23: Refused VS, irritable on approach, will offer Prolixin Dec tomorrow if more agreeable.  Did take PO meds.    10/24: Episodic agitation, did require IM prn last night.  Refused SHAFER and declined AM meds.  10/25: Remains irritable, refusing meds and VS, declines SHAFER, will likely require MOO.  10/28 Patient psychotic tenuous will increase Seroquel request transfer hearing start TOO, change prn to Prolixin Benadryl as never has had much response to olanzapine  10/29 Somewhat calmer today possibly from prn and taking Seroquel last night but refused BP meds, and Depakote which may affect medical condition encourage compliance placed MOO paperwork  10/30 Selectively compliant with medications. Remains illogical, paranoid, uncooperative with care  10/31 Psychotic disorganized needing prns taking medical meds part of time. Cont meds plan TOO hearing  11/1 Calmer at moment but quite psychotic, compliance with medical and psych meds very erratic Cont meds and plan for TOO hearing  11/2: Noncompliant with medications, uncooperative with unit rules and ADL care.   11/3: Remains paranoid  11/4 Agitated tenuous poorly compliant will go to court for TOO, will move Seroquel to all PM at her request with hope this may improve compliance  11/5 Somewhat calmer today no prns, very psychotic, erratic compliance. Will offer decanoate today  she is due, plan court for TOO in AM.  11/6 Patient psychotic, not much change will give Prolixin  18.75mg today, Cont to titrate Seroquel but only hs at her request.   11/7 Patient irritable, aggressive delusional. Cont decanoate along with increasing Seroquel and using prns , await state transfer  11/8 Patient irritable more negativistic today do not feel though she has catatonic mutism as she is talking to other staff and peers other than writer. Cont to titrate Seroquel if she is taking regularly. If she refuses Depakote regularly may need to eval for transfer for IV anti convulsants.  11/9 guarded, easily irritable but more visible, no acute events overnight/this am, partially compliant with medical meds. No SI/HI reported.   11/12 guarded, uncooperative, disorganized and irritable, partially cooperative with meds, refuses vitals.   11/13 needed PRN IM and restraints for agitation, physical aggression, calmer later on. Remains delusional, disorganized and labile, partially compliant with medications.  11/14 no overnight events, no PRN needed, refused am meds, complied with hs seroquel. Remains guarded, uncooperative and dismissive, no suicidal/homicidal behavior.   11/15 no acute outbursts, no PRN needed. She does not think seroquel is helpful, only partially compliant with medications, no SI/HI. She spoke to  about abilify, to review her past medications trials.  11/18 Cont current meds inclussing Shafer when due will re-eval Seroquel though other options limited will get ua and if patient will allow will add prn laxatives  11/19 Psychotic not much change level of agitation fluctuates while she regularly expresses paranoia and delusions of pregnancy. Cont current meds encourage compliant encourage allowing assist with ADL's. UA and C and S re-ordered  Plan:  -[facility] 2S under 2PC legal status  -Routine checks  -Bed block  -Continue Depakote 1000mg BID  -Continue Seroquel 150 mg QHS  -Prolixin Dec 18.75 mg received on 11/6/24.  -Neuro workup at [facility] negative for seizure activity or acute stroke

## 2024-11-20 LAB
GLUCOSE BLDC GLUCOMTR-MCNC: 225 MG/DL — HIGH (ref 70–99)
GLUCOSE BLDC GLUCOMTR-MCNC: 96 MG/DL — SIGNIFICANT CHANGE UP (ref 70–99)

## 2024-11-20 PROCEDURE — 99232 SBSQ HOSP IP/OBS MODERATE 35: CPT

## 2024-11-20 RX ORDER — DIPHENHYDRAMINE HCL 25 MG
12.5 CAPSULE ORAL ONCE
Refills: 0 | Status: DISCONTINUED | OUTPATIENT
Start: 2024-11-20 | End: 2024-12-11

## 2024-11-20 RX ORDER — FLUPHENAZINE HCL 5 MG
5 TABLET ORAL ONCE
Refills: 0 | Status: DISCONTINUED | OUTPATIENT
Start: 2024-11-20 | End: 2024-12-11

## 2024-11-20 RX ADMIN — Medication 1000 UNIT(S): at 10:18

## 2024-11-20 RX ADMIN — Medication 1000 MILLIGRAM(S): at 10:18

## 2024-11-20 RX ADMIN — QUETIAPINE FUMARATE 150 MILLIGRAM(S): 300 TABLET ORAL at 22:38

## 2024-11-20 RX ADMIN — Medication 15 MILLILITER(S): at 13:06

## 2024-11-20 RX ADMIN — Medication 1000 MILLIGRAM(S): at 22:37

## 2024-11-20 NOTE — BH CHART NOTE - NSEVENTNOTEFT_PSY_ALL_CORE
SAUL paged 23:29 for activation of IM PRN to address disorganized behavior and agitation. Pt disrobing herself in the hallway, combative, and yelling. IM Prolixin (and IM Benadryl per order specification to be given alongside) to fair effect.

## 2024-11-20 NOTE — BH INPATIENT PSYCHIATRY PROGRESS NOTE - NSBHMETABOLIC_PSY_ALL_CORE_FT
BMI: BMI (kg/m2): 29.7 (10-21-24 @ 16:58)  HbA1c: A1C with Estimated Average Glucose Result: 7.3 % (09-29-24 @ 17:15)    Glucose: POCT Blood Glucose.: 96 mg/dL (11-20-24 @ 08:00)    BP: --Vital Signs Last 24 Hrs  T(C): --  T(F): --  HR: --  BP: --  BP(mean): --  RR: --  SpO2: --      Lipid Panel: Date/Time: 09-30-24 @ 04:45  Cholesterol, Serum: 148  LDL Cholesterol Calculated: 64  HDL Cholesterol, Serum: 56  Total Cholesterol/HDL Ration Measurement: --  Triglycerides, Serum: 138

## 2024-11-20 NOTE — BH INPATIENT PSYCHIATRY PROGRESS NOTE - NSBHASSESSSUMMFT_PSY_ALL_CORE
Ms Azul is a 74 yo F w history of SCZ, Parkinson's dz, hypothyroidism, and multiple psych hospitalizations who presents for AMS. Pt transferred from [facility], where she was found to have fallen and was covered in urine. Pt has been hospitalized in [facility] with occasional stays at [facility] for medical concerns since the start of this year for psychosis. Per [facility] notes, pt appears to be awaiting transfer to [facility] hospital. Pt has been delusional at [facility] with recent delusions surrounding pregnancy. Current medications are Depakote 750 mg bid, Seroquel 50 mg bid, and Prolixin Dec 12.5 mg IM q2w (last dose 9/24).     In [facility] stroke code was negative for acute pathology. Pt refused MRI and neuro deemed it not necessary. EEG did not reveal seizure activity and toxic encephalopathy workup was also negative. Psychiatry continued Depakote 1000mg BID, Seroquel 75mg QHS and Prolixin Dec 12.5 mg IM q2w (last dose given on 10/9), next dose due 10/23/24. As per psych CL pt has been irritable, aggressive at times requiring PRN medications and restraints. Paranoid and disorganized.     On arrival to  pt continues to present paranoid delusions, mentions she was poisoned by nurses at [facility], continues to endorse delusions of pregnancy. Denies SI/HI. Requires inpatient level of care due to inability to care for herself or transition to outpatient level of care given severe episodes of agitation, aggression and disorganized behavior in the context of psychosis.        10/22 Clinical Update: Ms. Azul refused to have her vital signs taken this morning. No behavioral outbursts or aggression reported. Bloodwork reviewed and WNL. Valproic acid level within therapeutic range at 65.70. Pt is visible in the dayroom today. Seems sedated, dozing off during interview. May need Seroquel dose to be adjusted to minimize daytime sedation. Due for Prolixin dec tomorrow.    10/23: Refused VS, irritable on approach, will offer Prolixin Dec tomorrow if more agreeable.  Did take PO meds.    10/24: Episodic agitation, did require IM prn last night.  Refused SHAFER and declined AM meds.  10/25: Remains irritable, refusing meds and VS, declines SHAFER, will likely require MOO.  10/28 Patient psychotic tenuous will increase Seroquel request transfer hearing start TOO, change prn to Prolixin Benadryl as never has had much response to olanzapine  10/29 Somewhat calmer today possibly from prn and taking Seroquel last night but refused BP meds, and Depakote which may affect medical condition encourage compliance placed MOO paperwork  10/30 Selectively compliant with medications. Remains illogical, paranoid, uncooperative with care  10/31 Psychotic disorganized needing prns taking medical meds part of time. Cont meds plan TOO hearing  11/1 Calmer at moment but quite psychotic, compliance with medical and psych meds very erratic Cont meds and plan for TOO hearing  11/2: Noncompliant with medications, uncooperative with unit rules and ADL care.   11/3: Remains paranoid  11/4 Agitated tenuous poorly compliant will go to court for TOO, will move Seroquel to all PM at her request with hope this may improve compliance  11/5 Somewhat calmer today no prns, very psychotic, erratic compliance. Will offer decanoate today  she is due, plan court for TOO in AM.  11/6 Patient psychotic, not much change will give Prolixin  18.75mg today, Cont to titrate Seroquel but only hs at her request.   11/7 Patient irritable, aggressive delusional. Cont decanoate along with increasing Seroquel and using prns , await state transfer  11/8 Patient irritable more negativistic today do not feel though she has catatonic mutism as she is talking to other staff and peers other than writer. Cont to titrate Seroquel if she is taking regularly. If she refuses Depakote regularly may need to eval for transfer for IV anti convulsants.  11/9 guarded, easily irritable but more visible, no acute events overnight/this am, partially compliant with medical meds. No SI/HI reported.   11/12 guarded, uncooperative, disorganized and irritable, partially cooperative with meds, refuses vitals.   11/13 needed PRN IM and restraints for agitation, physical aggression, calmer later on. Remains delusional, disorganized and labile, partially compliant with medications.  11/14 no overnight events, no PRN needed, refused am meds, complied with hs seroquel. Remains guarded, uncooperative and dismissive, no suicidal/homicidal behavior.   11/15 no acute outbursts, no PRN needed. She does not think seroquel is helpful, only partially compliant with medications, no SI/HI. She spoke to  about abilify, to review her past medications trials.  11/18 Cont current meds including Shafer when due will re-eval Seroquel though other options limited will get ua and if patient will allow will add prn laxatives  11/19 Psychotic not much change level of agitation fluctuates while she regularly expresses paranoia and delusions of pregnancy. Cont current meds encourage compliant encourage allowing assist with ADL's. UA and C and S re-ordered  11/20 Not much change cont resistive to help with hygiene, delusional, irritable. Expressing willingness to try Abilify to [facility], will explore with patient  Plan:  -[facility] 2S under 2PC legal status  -Routine checks  -Bed block  -Continue Depakote 1000mg BID  -Continue Seroquel 150 mg QHS  -Prolixin Dec 18.75 mg received on 11/6/24.  -Neuro workup at [facility] negative for seizure activity or acute stroke

## 2024-11-20 NOTE — BH INPATIENT PSYCHIATRY PROGRESS NOTE - NSBHFUPINTERVALHXFT_PSY_A_CORE
Patient seen for schizoaffective disorder. Chart reviewed and discussed with nursing staff. Pt is only partially compliant with medications or takes much coaxing to comply. Patient dyshygenic clothes and room have urine smell and patient resistant to assistant with showering and washing room and clothes. Not aggressive or highly agitated, refuses vitals, eating well, sleep is only fair. Refused vitals. U/A clear

## 2024-11-21 LAB
CULTURE RESULTS: SIGNIFICANT CHANGE UP
GLUCOSE BLDC GLUCOMTR-MCNC: 141 MG/DL — HIGH (ref 70–99)
GLUCOSE BLDC GLUCOMTR-MCNC: 226 MG/DL — HIGH (ref 70–99)
GLUCOSE BLDC GLUCOMTR-MCNC: 278 MG/DL — HIGH (ref 70–99)
SPECIMEN SOURCE: SIGNIFICANT CHANGE UP

## 2024-11-21 PROCEDURE — 99232 SBSQ HOSP IP/OBS MODERATE 35: CPT

## 2024-11-21 RX ORDER — METFORMIN HYDROCHLORIDE 1000 MG/1
1000 TABLET, COATED ORAL
Refills: 0 | Status: DISCONTINUED | OUTPATIENT
Start: 2024-11-21 | End: 2024-12-20

## 2024-11-21 RX ADMIN — Medication 2 TABLET(S): at 19:56

## 2024-11-21 RX ADMIN — Medication 1000 MILLIGRAM(S): at 19:56

## 2024-11-21 RX ADMIN — ACETAMINOPHEN, DIPHENHYDRAMINE HCL, PHENYLEPHRINE HCL 6 MILLIGRAM(S): 325; 25; 5 TABLET ORAL at 19:56

## 2024-11-21 RX ADMIN — QUETIAPINE FUMARATE 150 MILLIGRAM(S): 300 TABLET ORAL at 19:56

## 2024-11-21 NOTE — BH INPATIENT PSYCHIATRY PROGRESS NOTE - NSBHFUPINTERVALHXFT_PSY_A_CORE
Patient seen for schizoaffective disorder. Chart reviewed and discussed with nursing staff. Pt is only partially compliant with medications or takes much coaxing to comply.  Last night re	qured  IM prn, possibly contributing to her being calmer today. Patient accepted shower yesterday Not aggressive or highly agitated, refuses vitals, eating well, sleep is only fair. Refused vitals. U/A clear

## 2024-11-21 NOTE — BH INPATIENT PSYCHIATRY PROGRESS NOTE - NSBHMETABOLIC_PSY_ALL_CORE_FT
BMI: BMI (kg/m2): 29.7 (10-21-24 @ 16:58)  HbA1c: A1C with Estimated Average Glucose Result: 7.3 % (09-29-24 @ 17:15)    Glucose: POCT Blood Glucose.: 141 mg/dL (11-21-24 @ 11:33)    BP: --Vital Signs Last 24 Hrs  T(C): --  T(F): --  HR: --  BP: --  BP(mean): --  RR: --  SpO2: --      Lipid Panel: Date/Time: 09-30-24 @ 04:45  Cholesterol, Serum: 148  LDL Cholesterol Calculated: 64  HDL Cholesterol, Serum: 56  Total Cholesterol/HDL Ration Measurement: --  Triglycerides, Serum: 138
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
## 2024-11-21 NOTE — BH INPATIENT PSYCHIATRY PROGRESS NOTE - CURRENT MEDICATION
MEDICATIONS  (STANDING):  cholecalciferol 1000 Unit(s) Oral daily  divalproex Sprinkle 1000 milliGRAM(s) Oral two times a day  glucagon  Injectable 1 milliGRAM(s) IntraMuscular once  levothyroxine 75 MICROGram(s) Oral daily  melatonin. 6 milliGRAM(s) Oral at bedtime  metFORMIN 1000 milliGRAM(s) Oral two times a day with meals  metoprolol succinate ER 50 milliGRAM(s) Oral daily  multivitamin/minerals/iron Oral Solution (CENTRUM) 15 milliLiter(s) Oral <User Schedule>  QUEtiapine 150 milliGRAM(s) Oral at bedtime  senna 2 Tablet(s) Oral at bedtime    MEDICATIONS  (PRN):  acetaminophen     Tablet .. 650 milliGRAM(s) Oral every 6 hours PRN Mild Pain (1 - 3)  albuterol    90 MICROgram(s) HFA Inhaler 2 Puff(s) Inhalation every 6 hours PRN Bronchospasm  aluminum hydroxide/magnesium hydroxide/simethicone Suspension 30 milliLiter(s) Oral every 4 hours PRN Dyspepsia  bisacodyl 5 milliGRAM(s) Oral daily PRN Constipation  dextrose Oral Gel 15 Gram(s) Oral once PRN Blood Glucose LESS THAN 70 milliGRAM(s)/deciliter  dextrose Oral Gel 15 Gram(s) Oral once PRN hypoglycemia  diphenhydrAMINE Elixir 12.5 milliGRAM(s) Oral every 6 hours PRN eps prevention  diphenhydrAMINE Injectable 12.5 milliGRAM(s) IntraMuscular once PRN EPS prevention  diphenhydrAMINE Injectable 12.5 milliGRAM(s) IntraMuscular once PRN EPS prevention  fluPHENAZine 5 milliGRAM(s) Oral every 6 hours PRN agitation  fluPHENAZine  Injectable 5 milliGRAM(s) IntraMuscular once PRN agitation  fluPHENAZine  Injectable 5 milliGRAM(s) IntraMuscular once PRN agitation  polyethylene glycol 3350 17 Gram(s) Oral daily PRN constipation

## 2024-11-22 PROCEDURE — 99232 SBSQ HOSP IP/OBS MODERATE 35: CPT

## 2024-11-22 RX ORDER — QUETIAPINE FUMARATE 300 MG/1
175 TABLET ORAL AT BEDTIME
Refills: 0 | Status: DISCONTINUED | OUTPATIENT
Start: 2024-11-22 | End: 2024-11-25

## 2024-11-22 RX ADMIN — Medication 15 MILLILITER(S): at 11:07

## 2024-11-22 RX ADMIN — ACETAMINOPHEN, DIPHENHYDRAMINE HCL, PHENYLEPHRINE HCL 6 MILLIGRAM(S): 325; 25; 5 TABLET ORAL at 21:09

## 2024-11-22 RX ADMIN — Medication 2 TABLET(S): at 21:09

## 2024-11-22 RX ADMIN — LEVOTHYROXINE SODIUM 75 MICROGRAM(S): 25 TABLET ORAL at 05:27

## 2024-11-22 RX ADMIN — Medication 1000 MILLIGRAM(S): at 11:07

## 2024-11-22 RX ADMIN — METFORMIN HYDROCHLORIDE 1000 MILLIGRAM(S): 1000 TABLET, COATED ORAL at 11:12

## 2024-11-22 RX ADMIN — Medication 1000 MILLIGRAM(S): at 21:09

## 2024-11-22 RX ADMIN — QUETIAPINE FUMARATE 175 MILLIGRAM(S): 300 TABLET ORAL at 21:09

## 2024-11-22 RX ADMIN — Medication 50 MILLIGRAM(S): at 11:12

## 2024-11-22 NOTE — BH INPATIENT PSYCHIATRY PROGRESS NOTE - CURRENT MEDICATION
MEDICATIONS  (STANDING):  cholecalciferol 1000 Unit(s) Oral daily  divalproex Sprinkle 1000 milliGRAM(s) Oral two times a day  glucagon  Injectable 1 milliGRAM(s) IntraMuscular once  levothyroxine 75 MICROGram(s) Oral daily  melatonin. 6 milliGRAM(s) Oral at bedtime  metFORMIN 1000 milliGRAM(s) Oral two times a day with meals  metoprolol succinate ER 50 milliGRAM(s) Oral daily  multivitamin/minerals/iron Oral Solution (CENTRUM) 15 milliLiter(s) Oral <User Schedule>  QUEtiapine 175 milliGRAM(s) Oral at bedtime  senna 2 Tablet(s) Oral at bedtime    MEDICATIONS  (PRN):  acetaminophen     Tablet .. 650 milliGRAM(s) Oral every 6 hours PRN Mild Pain (1 - 3)  albuterol    90 MICROgram(s) HFA Inhaler 2 Puff(s) Inhalation every 6 hours PRN Bronchospasm  aluminum hydroxide/magnesium hydroxide/simethicone Suspension 30 milliLiter(s) Oral every 4 hours PRN Dyspepsia  bisacodyl 5 milliGRAM(s) Oral daily PRN Constipation  dextrose Oral Gel 15 Gram(s) Oral once PRN Blood Glucose LESS THAN 70 milliGRAM(s)/deciliter  dextrose Oral Gel 15 Gram(s) Oral once PRN hypoglycemia  diphenhydrAMINE Elixir 12.5 milliGRAM(s) Oral every 6 hours PRN eps prevention  diphenhydrAMINE Injectable 12.5 milliGRAM(s) IntraMuscular once PRN EPS prevention  diphenhydrAMINE Injectable 12.5 milliGRAM(s) IntraMuscular once PRN EPS prevention  fluPHENAZine 5 milliGRAM(s) Oral every 6 hours PRN agitation  fluPHENAZine  Injectable 5 milliGRAM(s) IntraMuscular once PRN agitation  fluPHENAZine  Injectable 5 milliGRAM(s) IntraMuscular once PRN agitation  polyethylene glycol 3350 17 Gram(s) Oral daily PRN constipation

## 2024-11-22 NOTE — BH INPATIENT PSYCHIATRY PROGRESS NOTE - NSBHCHARTREVIEWVS_PSY_A_CORE FT
Vital Signs Last 24 Hrs  T(C): 36.3 (11-22-24 @ 07:45), Max: 36.3 (11-22-24 @ 07:45)  T(F): 97.4 (11-22-24 @ 07:45), Max: 97.4 (11-22-24 @ 07:45)  HR: --  BP: 146/90 (11-21-24 @ 20:34) (146/90 - 146/90)  BP(mean): 100 (11-21-24 @ 20:34) (100 - 100)  RR: --  SpO2: --    Orthostatic VS  11-22-24 @ 07:45  Lying BP: --/-- HR: --  Sitting BP: 148/77 HR: 81  Standing BP: 169/92 HR: 82  Site: --  Mode: --

## 2024-11-22 NOTE — BH INPATIENT PSYCHIATRY PROGRESS NOTE - NSBHMETABOLIC_PSY_ALL_CORE_FT
BMI: BMI (kg/m2): 29.7 (10-21-24 @ 16:58)  HbA1c: A1C with Estimated Average Glucose Result: 7.3 % (09-29-24 @ 17:15)    Glucose: POCT Blood Glucose.: 226 mg/dL (11-21-24 @ 20:31)    BP: 146/90 (11-21-24 @ 20:34) (146/90 - 146/90)Vital Signs Last 24 Hrs  T(C): 36.3 (11-22-24 @ 07:45), Max: 36.3 (11-22-24 @ 07:45)  T(F): 97.4 (11-22-24 @ 07:45), Max: 97.4 (11-22-24 @ 07:45)  HR: --  BP: 146/90 (11-21-24 @ 20:34) (146/90 - 146/90)  BP(mean): 100 (11-21-24 @ 20:34) (100 - 100)  RR: --  SpO2: --    Orthostatic VS  11-22-24 @ 07:45  Lying BP: --/-- HR: --  Sitting BP: 148/77 HR: 81  Standing BP: 169/92 HR: 82  Site: --  Mode: --    Lipid Panel: Date/Time: 09-30-24 @ 04:45  Cholesterol, Serum: 148  LDL Cholesterol Calculated: 64  HDL Cholesterol, Serum: 56  Total Cholesterol/HDL Ration Measurement: --  Triglycerides, Serum: 138
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
## 2024-11-22 NOTE — BH INPATIENT PSYCHIATRY PROGRESS NOTE - NSBHFUPINTERVALHXFT_PSY_A_CORE
Patient seen for schizoaffective disorder. Chart reviewed and discussed with nursing staff. Pt is only partially compliant with medications or takes much coaxing to comply.  Last night re	qured  IM prn, possibly contributing to her being calmer today. Patient accepted shower yesterday Not aggressive or highly agitated, refuses vitals, eating well, sleep is only fair. VSS sl elevated but likely affected by cooperation, tremor U/A clear

## 2024-11-23 RX ADMIN — Medication 2 TABLET(S): at 19:55

## 2024-11-23 RX ADMIN — QUETIAPINE FUMARATE 175 MILLIGRAM(S): 300 TABLET ORAL at 19:55

## 2024-11-23 RX ADMIN — ACETAMINOPHEN, DIPHENHYDRAMINE HCL, PHENYLEPHRINE HCL 6 MILLIGRAM(S): 325; 25; 5 TABLET ORAL at 19:55

## 2024-11-23 RX ADMIN — Medication 1000 MILLIGRAM(S): at 19:55

## 2024-11-23 RX ADMIN — Medication 1000 MILLIGRAM(S): at 10:14

## 2024-11-24 RX ADMIN — QUETIAPINE FUMARATE 175 MILLIGRAM(S): 300 TABLET ORAL at 20:12

## 2024-11-24 RX ADMIN — LEVOTHYROXINE SODIUM 75 MICROGRAM(S): 25 TABLET ORAL at 05:24

## 2024-11-24 RX ADMIN — ACETAMINOPHEN 650 MILLIGRAM(S): 160 SUSPENSION ORAL at 13:44

## 2024-11-24 RX ADMIN — Medication 1000 MILLIGRAM(S): at 20:11

## 2024-11-24 RX ADMIN — ACETAMINOPHEN, DIPHENHYDRAMINE HCL, PHENYLEPHRINE HCL 6 MILLIGRAM(S): 325; 25; 5 TABLET ORAL at 20:11

## 2024-11-24 RX ADMIN — Medication 1000 UNIT(S): at 09:21

## 2024-11-24 RX ADMIN — Medication 1000 MILLIGRAM(S): at 09:21

## 2024-11-24 RX ADMIN — Medication 5 MILLIGRAM(S): at 20:11

## 2024-11-24 RX ADMIN — ACETAMINOPHEN 650 MILLIGRAM(S): 160 SUSPENSION ORAL at 14:44

## 2024-11-24 RX ADMIN — Medication 15 MILLILITER(S): at 12:36
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
## 2024-11-25 PROCEDURE — 99232 SBSQ HOSP IP/OBS MODERATE 35: CPT

## 2024-11-25 RX ORDER — QUETIAPINE FUMARATE 300 MG/1
200 TABLET ORAL AT BEDTIME
Refills: 0 | Status: DISCONTINUED | OUTPATIENT
Start: 2024-11-25 | End: 2025-01-02

## 2024-11-25 RX ORDER — MECOBAL/LEVOMEFOLAT CA/B6 PHOS 2-3-35 MG
1 TABLET ORAL DAILY
Refills: 0 | Status: DISCONTINUED | OUTPATIENT
Start: 2024-11-25 | End: 2024-11-26

## 2024-11-25 RX ORDER — MECOBAL/LEVOMEFOLAT CA/B6 PHOS 2-3-35 MG
1 TABLET ORAL ONCE
Refills: 0 | Status: DISCONTINUED | OUTPATIENT
Start: 2024-11-25 | End: 2024-12-03

## 2024-11-25 RX ADMIN — Medication 1000 UNIT(S): at 09:05

## 2024-11-25 RX ADMIN — QUETIAPINE FUMARATE 200 MILLIGRAM(S): 300 TABLET ORAL at 22:21

## 2024-11-25 RX ADMIN — Medication 1000 MILLIGRAM(S): at 22:20

## 2024-11-25 RX ADMIN — Medication 1000 MILLIGRAM(S): at 09:06

## 2024-11-25 RX ADMIN — LEVOTHYROXINE SODIUM 75 MICROGRAM(S): 25 TABLET ORAL at 06:24

## 2024-11-25 NOTE — ADVANCED PRACTICE NURSE CONSULT - RECOMMEDATIONS
Endorsed to primary team for nursing staff to have diet drinks available for patient instead of regular drinks.

## 2024-11-25 NOTE — BH INPATIENT PSYCHIATRY PROGRESS NOTE - NSBHCHARTREVIEWVS_PSY_A_CORE FT
Vital Signs Last 24 Hrs  T(C): --  T(F): --  HR: --  BP: --  BP(mean): --  RR: --  SpO2: --    Orthostatic VS  11-25-24 @ 07:44  Lying BP: --/-- HR: --  Sitting BP: 151/86 HR: 90  Standing BP: 148/76 HR: 95  Site: --  Mode: --

## 2024-11-25 NOTE — BH INPATIENT PSYCHIATRY PROGRESS NOTE - CURRENT MEDICATION
MEDICATIONS  (STANDING):  cholecalciferol 1000 Unit(s) Oral daily  divalproex Sprinkle 1000 milliGRAM(s) Oral two times a day  glucagon  Injectable 1 milliGRAM(s) IntraMuscular once  levothyroxine 75 MICROGram(s) Oral daily  melatonin. 6 milliGRAM(s) Oral at bedtime  metFORMIN 1000 milliGRAM(s) Oral two times a day with meals  metoprolol succinate ER 50 milliGRAM(s) Oral daily  multivitamin 1 Tablet(s) Oral once  multivitamin 1 Tablet(s) Oral daily  QUEtiapine 200 milliGRAM(s) Oral at bedtime  senna 2 Tablet(s) Oral at bedtime    MEDICATIONS  (PRN):  acetaminophen     Tablet .. 650 milliGRAM(s) Oral every 6 hours PRN Mild Pain (1 - 3)  albuterol    90 MICROgram(s) HFA Inhaler 2 Puff(s) Inhalation every 6 hours PRN Bronchospasm  aluminum hydroxide/magnesium hydroxide/simethicone Suspension 30 milliLiter(s) Oral every 4 hours PRN Dyspepsia  bisacodyl 5 milliGRAM(s) Oral daily PRN Constipation  dextrose Oral Gel 15 Gram(s) Oral once PRN Blood Glucose LESS THAN 70 milliGRAM(s)/deciliter  dextrose Oral Gel 15 Gram(s) Oral once PRN hypoglycemia  diphenhydrAMINE Elixir 12.5 milliGRAM(s) Oral every 6 hours PRN eps prevention  diphenhydrAMINE Injectable 12.5 milliGRAM(s) IntraMuscular once PRN EPS prevention  diphenhydrAMINE Injectable 12.5 milliGRAM(s) IntraMuscular once PRN EPS prevention  fluPHENAZine 5 milliGRAM(s) Oral every 6 hours PRN agitation  fluPHENAZine  Injectable 5 milliGRAM(s) IntraMuscular once PRN agitation  fluPHENAZine  Injectable 5 milliGRAM(s) IntraMuscular once PRN agitation  polyethylene glycol 3350 17 Gram(s) Oral daily PRN constipation

## 2024-11-25 NOTE — BH INPATIENT PSYCHIATRY PROGRESS NOTE - NSBHFUPINTERVALHXFT_PSY_A_CORE
Patient seen for schizoaffective disorder. Chart reviewed and discussed with nursing staff. Pt is only partially compliant with medications or takes much coaxing to comply. Not aggressive or highly agitated, refuses vitals, eating well, sleep is only fair. VSS sl elevated. needs sig effort to get her to take meds

## 2024-11-25 NOTE — BH INPATIENT PSYCHIATRY PROGRESS NOTE - NSBHMETABOLIC_PSY_ALL_CORE_FT
BMI: BMI (kg/m2): 29.7 (10-21-24 @ 16:58)  HbA1c: A1C with Estimated Average Glucose Result: 7.3 % (09-29-24 @ 17:15)    Glucose: POCT Blood Glucose.: 226 mg/dL (11-21-24 @ 20:31)    BP: --Vital Signs Last 24 Hrs  T(C): --  T(F): --  HR: --  BP: --  BP(mean): --  RR: --  SpO2: --    Orthostatic VS  11-25-24 @ 07:44  Lying BP: --/-- HR: --  Sitting BP: 151/86 HR: 90  Standing BP: 148/76 HR: 95  Site: --  Mode: --    Lipid Panel: Date/Time: 09-30-24 @ 04:45  Cholesterol, Serum: 148  LDL Cholesterol Calculated: 64  HDL Cholesterol, Serum: 56  Total Cholesterol/HDL Ration Measurement: --  Triglycerides, Serum: 138
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
## 2024-11-26 LAB — GLUCOSE BLDC GLUCOMTR-MCNC: 291 MG/DL — HIGH (ref 70–99)

## 2024-11-26 PROCEDURE — 99232 SBSQ HOSP IP/OBS MODERATE 35: CPT

## 2024-11-26 RX ORDER — MECOBAL/LEVOMEFOLAT CA/B6 PHOS 2-3-35 MG
1 TABLET ORAL AT BEDTIME
Refills: 0 | Status: DISCONTINUED | OUTPATIENT
Start: 2024-11-27 | End: 2024-12-26

## 2024-11-26 RX ORDER — FLUPHENAZINE HCL 5 MG
18.75 TABLET ORAL ONCE
Refills: 0 | Status: COMPLETED | OUTPATIENT
Start: 2024-11-29 | End: 2024-11-29

## 2024-11-26 RX ADMIN — Medication 1000 MILLIGRAM(S): at 23:17

## 2024-11-26 RX ADMIN — Medication 5 MILLIGRAM(S): at 23:18

## 2024-11-26 RX ADMIN — ACETAMINOPHEN, DIPHENHYDRAMINE HCL, PHENYLEPHRINE HCL 6 MILLIGRAM(S): 325; 25; 5 TABLET ORAL at 23:18

## 2024-11-26 RX ADMIN — Medication 1 TABLET(S): at 13:33

## 2024-11-26 RX ADMIN — Medication 1000 MILLIGRAM(S): at 13:33

## 2024-11-26 RX ADMIN — Medication 1000 UNIT(S): at 13:32

## 2024-11-26 RX ADMIN — QUETIAPINE FUMARATE 200 MILLIGRAM(S): 300 TABLET ORAL at 23:18

## 2024-11-26 RX ADMIN — LEVOTHYROXINE SODIUM 75 MICROGRAM(S): 25 TABLET ORAL at 06:43

## 2024-11-26 RX ADMIN — Medication 2 TABLET(S): at 23:17
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
## 2024-11-26 NOTE — BH INPATIENT PSYCHIATRY PROGRESS NOTE - NSBHFUPINTERVALHXFT_PSY_A_CORE
Other Patient seen for schizoaffective disorder. Chart reviewed and discussed with nursing staff. Pt is only partially compliant with medications or takes much coaxing to comply but overall some gains in compliance. Irritable, verbally abusive but less overall agitation. Refused vitals. Eating sleeping well

## 2024-11-26 NOTE — PHARMACOTHERAPY INTERVENTION NOTE - COMMENTS
In monitoring patients on levothyroxine for drug-drug interactions and timing of administration, identified patient with an order for multivitamin, which contains calcium and iron, scheduled for daily at 0900. Levothyroxine defaults to daily at 0600. Concurrent use of levothyroxine and iron-, aluminum-, calcium- or magnesium-containing products may result in decreased levothyroxine absorption and must be  from levothyroxine by at least 4 hours.     Recommended adjusting dosing schedule for multivitamin to bedtime to avoid the interaction, as clinically appropriate.    Marybeth Blount, PharmD, BCPP  
Concurrent use of levothyroxine, antacids (e.g. magnesium and aluminum), and simethicone may result in decreased levothyroxine effectiveness.     Added administration instruction to PRN order for aluminum hydroxide/magnesium hydroxide/simethicone suspension to "Do not administer within 4 hours of levothyroxine". 
In monitoring patients of levothyroxine for drug-drug interactions and timing of administration, identified patient with an order for multivitamin, which contains calcium and iron, scheduled for daily at 09:00. Levothyroxine defaults to daily at 06:00. Concurrent use of levothyroxine and iron-, aluminum-, calcium-, or magensium-containing products may result in decreased levothyroxine absorption and must be  from levothyroxine by at least 4 hours.    Recommended adjusting dosing schedule for multivitamin to bedtime to avoid the interaction, as clinically appropriate. 
In monitoring patients on levothyroxine for drug-drug interactions and timing of administration, identified patient with an order for multivitamin, which contains calcium and iron, scheduled for daily at 0900. Levothyroxine defaults to daily at 0600. Concurrent use of levothyroxine and iron-, aluminum-, calcium- or magnesium-containing products may result in decreased levothyroxine absorption and must be  from levothyroxine by at least 4 hours.     Recommended adjusting dosing schedule for multivitamin/minerals to bedtime to avoid the interaction, as clinically appropriate.    Marybeth Blount, PharmD, BCPP

## 2024-11-26 NOTE — BH INPATIENT PSYCHIATRY PROGRESS NOTE - NSBHCHARTREVIEWVS_PSY_A_CORE FT
Vital Signs Last 24 Hrs  T(C): --  T(F): --  HR: --  BP: --  BP(mean): --  RR: --  SpO2: --    Orthostatic VS  11-25-24 @ 20:28  Lying BP: --/-- HR: --  Sitting BP: 143/93 HR: 106  Standing BP: --/-- HR: --  Site: --  Mode: --  Orthostatic VS  11-25-24 @ 07:44  Lying BP: --/-- HR: --  Sitting BP: 151/86 HR: 90  Standing BP: 148/76 HR: 95  Site: --  Mode: --

## 2024-11-26 NOTE — BH INPATIENT PSYCHIATRY PROGRESS NOTE - NSBHMETABOLIC_PSY_ALL_CORE_FT
BMI: BMI (kg/m2): 29.7 (10-21-24 @ 16:58)  HbA1c: A1C with Estimated Average Glucose Result: 7.3 % (09-29-24 @ 17:15)    Glucose: POCT Blood Glucose.: 226 mg/dL (11-21-24 @ 20:31)    BP: --Vital Signs Last 24 Hrs  T(C): --  T(F): --  HR: --  BP: --  BP(mean): --  RR: --  SpO2: --    Orthostatic VS  11-25-24 @ 20:28  Lying BP: --/-- HR: --  Sitting BP: 143/93 HR: 106  Standing BP: --/-- HR: --  Site: --  Mode: --  Orthostatic VS  11-25-24 @ 07:44  Lying BP: --/-- HR: --  Sitting BP: 151/86 HR: 90  Standing BP: 148/76 HR: 95  Site: --  Mode: --    Lipid Panel: Date/Time: 09-30-24 @ 04:45  Cholesterol, Serum: 148  LDL Cholesterol Calculated: 64  HDL Cholesterol, Serum: 56  Total Cholesterol/HDL Ration Measurement: --  Triglycerides, Serum: 138

## 2024-11-26 NOTE — BH INPATIENT PSYCHIATRY PROGRESS NOTE - CURRENT MEDICATION
MEDICATIONS  (STANDING):  cholecalciferol 1000 Unit(s) Oral daily  divalproex Sprinkle 1000 milliGRAM(s) Oral two times a day  glucagon  Injectable 1 milliGRAM(s) IntraMuscular once  levothyroxine 75 MICROGram(s) Oral daily  melatonin. 6 milliGRAM(s) Oral at bedtime  metFORMIN 1000 milliGRAM(s) Oral two times a day with meals  metoprolol succinate ER 50 milliGRAM(s) Oral daily  multivitamin 1 Tablet(s) Oral once  multivitamin 1 Tablet(s) Oral daily  QUEtiapine 200 milliGRAM(s) Oral at bedtime  senna 2 Tablet(s) Oral at bedtime    MEDICATIONS  (PRN):  acetaminophen     Tablet .. 650 milliGRAM(s) Oral every 6 hours PRN Mild Pain (1 - 3)  albuterol    90 MICROgram(s) HFA Inhaler 2 Puff(s) Inhalation every 6 hours PRN Bronchospasm  aluminum hydroxide/magnesium hydroxide/simethicone Suspension 30 milliLiter(s) Oral every 4 hours PRN Dyspepsia  bisacodyl 5 milliGRAM(s) Oral daily PRN Constipation  dextrose Oral Gel 15 Gram(s) Oral once PRN hypoglycemia  dextrose Oral Gel 15 Gram(s) Oral once PRN Blood Glucose LESS THAN 70 milliGRAM(s)/deciliter  diphenhydrAMINE Elixir 12.5 milliGRAM(s) Oral every 6 hours PRN eps prevention  diphenhydrAMINE Injectable 12.5 milliGRAM(s) IntraMuscular once PRN EPS prevention  diphenhydrAMINE Injectable 12.5 milliGRAM(s) IntraMuscular once PRN EPS prevention  fluPHENAZine 5 milliGRAM(s) Oral every 6 hours PRN agitation  fluPHENAZine  Injectable 5 milliGRAM(s) IntraMuscular once PRN agitation  fluPHENAZine  Injectable 5 milliGRAM(s) IntraMuscular once PRN agitation  polyethylene glycol 3350 17 Gram(s) Oral daily PRN constipation

## 2024-11-27 PROCEDURE — 99232 SBSQ HOSP IP/OBS MODERATE 35: CPT

## 2024-11-27 RX ADMIN — QUETIAPINE FUMARATE 200 MILLIGRAM(S): 300 TABLET ORAL at 22:01

## 2024-11-27 RX ADMIN — Medication 1000 MILLIGRAM(S): at 22:00

## 2024-11-27 RX ADMIN — Medication 1 TABLET(S): at 22:01

## 2024-11-27 NOTE — BH INPATIENT PSYCHIATRY PROGRESS NOTE - NSBHCHARTREVIEWVS_PSY_A_CORE FT
Vital Signs Last 24 Hrs  T(C): --  T(F): --  HR: --  BP: --  BP(mean): --  RR: --  SpO2: --    Orthostatic VS  11-25-24 @ 20:28  Lying BP: --/-- HR: --  Sitting BP: 143/93 HR: 106  Standing BP: --/-- HR: --  Site: --  Mode: --
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
## 2024-11-27 NOTE — BH INPATIENT PSYCHIATRY PROGRESS NOTE - NSBHFUPINTERVALHXFT_PSY_A_CORE
Patient seen for schizoaffective disorder. Chart reviewed and discussed with nursing staff. Pt is only partially compliant with medications or takes much coaxing to comply but overall some gains in compliance. Irritable, verbally abusive but less overall agitation. Refused vitals. Eating sleeping well. Room needed to be empty for repair and it took much effort to remove items, she sat by guarding them in dining area

## 2024-11-27 NOTE — BH INPATIENT PSYCHIATRY PROGRESS NOTE - CURRENT MEDICATION
MEDICATIONS  (STANDING):  cholecalciferol 1000 Unit(s) Oral daily  divalproex Sprinkle 1000 milliGRAM(s) Oral two times a day  glucagon  Injectable 1 milliGRAM(s) IntraMuscular once  levothyroxine 75 MICROGram(s) Oral daily  melatonin. 6 milliGRAM(s) Oral at bedtime  metFORMIN 1000 milliGRAM(s) Oral two times a day with meals  metoprolol succinate ER 50 milliGRAM(s) Oral daily  multivitamin 1 Tablet(s) Oral at bedtime  multivitamin 1 Tablet(s) Oral once  QUEtiapine 200 milliGRAM(s) Oral at bedtime  senna 2 Tablet(s) Oral at bedtime    MEDICATIONS  (PRN):  acetaminophen     Tablet .. 650 milliGRAM(s) Oral every 6 hours PRN Mild Pain (1 - 3)  albuterol    90 MICROgram(s) HFA Inhaler 2 Puff(s) Inhalation every 6 hours PRN Bronchospasm  aluminum hydroxide/magnesium hydroxide/simethicone Suspension 30 milliLiter(s) Oral every 4 hours PRN Dyspepsia  bisacodyl 5 milliGRAM(s) Oral daily PRN Constipation  dextrose Oral Gel 15 Gram(s) Oral once PRN Blood Glucose LESS THAN 70 milliGRAM(s)/deciliter  dextrose Oral Gel 15 Gram(s) Oral once PRN hypoglycemia  diphenhydrAMINE Elixir 12.5 milliGRAM(s) Oral every 6 hours PRN eps prevention  diphenhydrAMINE Injectable 12.5 milliGRAM(s) IntraMuscular once PRN EPS prevention  diphenhydrAMINE Injectable 12.5 milliGRAM(s) IntraMuscular once PRN EPS prevention  fluPHENAZine 5 milliGRAM(s) Oral every 6 hours PRN agitation  fluPHENAZine  Injectable 5 milliGRAM(s) IntraMuscular once PRN agitation  fluPHENAZine  Injectable 5 milliGRAM(s) IntraMuscular once PRN agitation  polyethylene glycol 3350 17 Gram(s) Oral daily PRN constipation

## 2024-11-28 RX ADMIN — Medication 1 TABLET(S): at 21:45

## 2024-11-28 RX ADMIN — ACETAMINOPHEN, DIPHENHYDRAMINE HCL, PHENYLEPHRINE HCL 6 MILLIGRAM(S): 325; 25; 5 TABLET ORAL at 21:46

## 2024-11-28 RX ADMIN — Medication 2 TABLET(S): at 21:45

## 2024-11-28 RX ADMIN — QUETIAPINE FUMARATE 200 MILLIGRAM(S): 300 TABLET ORAL at 21:45

## 2024-11-28 RX ADMIN — Medication 1000 MILLIGRAM(S): at 21:44

## 2024-11-29 LAB — GLUCOSE BLDC GLUCOMTR-MCNC: 168 MG/DL — HIGH (ref 70–99)

## 2024-11-29 PROCEDURE — 99232 SBSQ HOSP IP/OBS MODERATE 35: CPT

## 2024-11-29 RX ADMIN — Medication 18.75 MILLIGRAM(S): at 15:37

## 2024-11-29 RX ADMIN — Medication 2 TABLET(S): at 22:42

## 2024-11-29 RX ADMIN — QUETIAPINE FUMARATE 200 MILLIGRAM(S): 300 TABLET ORAL at 22:42

## 2024-11-29 RX ADMIN — LEVOTHYROXINE SODIUM 75 MICROGRAM(S): 25 TABLET ORAL at 06:13

## 2024-11-29 RX ADMIN — Medication 1000 MILLIGRAM(S): at 22:41

## 2024-11-29 RX ADMIN — ACETAMINOPHEN, DIPHENHYDRAMINE HCL, PHENYLEPHRINE HCL 6 MILLIGRAM(S): 325; 25; 5 TABLET ORAL at 22:42

## 2024-11-29 RX ADMIN — Medication 1 TABLET(S): at 22:42

## 2024-11-29 NOTE — BH INPATIENT PSYCHIATRY PROGRESS NOTE - NSBHMETABOLIC_PSY_ALL_CORE_FT
BMI: BMI (kg/m2): 29.7 (10-21-24 @ 16:58)  HbA1c: A1C with Estimated Average Glucose Result: 7.3 % (09-29-24 @ 17:15)    Glucose: POCT Blood Glucose.: 168 mg/dL (11-29-24 @ 07:55)    BP: --Vital Signs Last 24 Hrs  T(C): --  T(F): --  HR: --  BP: --  BP(mean): --  RR: --  SpO2: --      Lipid Panel: Date/Time: 09-30-24 @ 04:45  Cholesterol, Serum: 148  LDL Cholesterol Calculated: 64  HDL Cholesterol, Serum: 56  Total Cholesterol/HDL Ration Measurement: --  Triglycerides, Serum: 138

## 2024-11-29 NOTE — BH INPATIENT PSYCHIATRY PROGRESS NOTE - NSBHFUPINTERVALHXFT_PSY_A_CORE
Patient seen for schizoaffective disorder. Chart reviewed and discussed with nursing staff. Pt is only partially compliant with medications or takes much coaxing to comply but overall some gains in compliance. Irritable, verbally abusive but less overall agitation. Refused vitals. Eating sleeping well.

## 2024-11-29 NOTE — BH INPATIENT PSYCHIATRY PROGRESS NOTE - CURRENT MEDICATION
MEDICATIONS  (STANDING):  cholecalciferol 1000 Unit(s) Oral daily  divalproex Sprinkle 1000 milliGRAM(s) Oral two times a day  fluPHENAZine decanoate Injectable, Long Acting 18.75 milliGRAM(s) IntraMuscular once  glucagon  Injectable 1 milliGRAM(s) IntraMuscular once  levothyroxine 75 MICROGram(s) Oral daily  melatonin. 6 milliGRAM(s) Oral at bedtime  metFORMIN 1000 milliGRAM(s) Oral two times a day with meals  metoprolol succinate ER 50 milliGRAM(s) Oral daily  multivitamin 1 Tablet(s) Oral once  multivitamin 1 Tablet(s) Oral at bedtime  QUEtiapine 200 milliGRAM(s) Oral at bedtime  senna 2 Tablet(s) Oral at bedtime    MEDICATIONS  (PRN):  acetaminophen     Tablet .. 650 milliGRAM(s) Oral every 6 hours PRN Mild Pain (1 - 3)  albuterol    90 MICROgram(s) HFA Inhaler 2 Puff(s) Inhalation every 6 hours PRN Bronchospasm  aluminum hydroxide/magnesium hydroxide/simethicone Suspension 30 milliLiter(s) Oral every 4 hours PRN Dyspepsia  bisacodyl 5 milliGRAM(s) Oral daily PRN Constipation  dextrose Oral Gel 15 Gram(s) Oral once PRN hypoglycemia  dextrose Oral Gel 15 Gram(s) Oral once PRN Blood Glucose LESS THAN 70 milliGRAM(s)/deciliter  diphenhydrAMINE Elixir 12.5 milliGRAM(s) Oral every 6 hours PRN eps prevention  diphenhydrAMINE Injectable 12.5 milliGRAM(s) IntraMuscular once PRN EPS prevention  diphenhydrAMINE Injectable 12.5 milliGRAM(s) IntraMuscular once PRN EPS prevention  fluPHENAZine 5 milliGRAM(s) Oral every 6 hours PRN agitation  fluPHENAZine  Injectable 5 milliGRAM(s) IntraMuscular once PRN agitation  fluPHENAZine  Injectable 5 milliGRAM(s) IntraMuscular once PRN agitation  polyethylene glycol 3350 17 Gram(s) Oral daily PRN constipation
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
## 2024-11-30 RX ADMIN — Medication 1 TABLET(S): at 21:49

## 2024-11-30 RX ADMIN — ACETAMINOPHEN, DIPHENHYDRAMINE HCL, PHENYLEPHRINE HCL 6 MILLIGRAM(S): 325; 25; 5 TABLET ORAL at 21:49

## 2024-11-30 RX ADMIN — QUETIAPINE FUMARATE 200 MILLIGRAM(S): 300 TABLET ORAL at 21:49

## 2024-11-30 RX ADMIN — Medication 2 TABLET(S): at 21:49

## 2024-11-30 RX ADMIN — Medication 1000 MILLIGRAM(S): at 21:49

## 2024-12-01 RX ADMIN — Medication 2 TABLET(S): at 21:41

## 2024-12-01 RX ADMIN — QUETIAPINE FUMARATE 200 MILLIGRAM(S): 300 TABLET ORAL at 21:40

## 2024-12-01 RX ADMIN — Medication 1 TABLET(S): at 21:40

## 2024-12-01 RX ADMIN — Medication 1000 MILLIGRAM(S): at 21:40

## 2024-12-01 RX ADMIN — LEVOTHYROXINE SODIUM 75 MICROGRAM(S): 25 TABLET ORAL at 05:49

## 2024-12-01 RX ADMIN — ACETAMINOPHEN, DIPHENHYDRAMINE HCL, PHENYLEPHRINE HCL 6 MILLIGRAM(S): 325; 25; 5 TABLET ORAL at 21:41

## 2024-12-02 LAB — GLUCOSE BLDC GLUCOMTR-MCNC: 228 MG/DL — HIGH (ref 70–99)

## 2024-12-02 PROCEDURE — 99232 SBSQ HOSP IP/OBS MODERATE 35: CPT

## 2024-12-02 RX ADMIN — Medication 1000 MILLIGRAM(S): at 21:56

## 2024-12-02 RX ADMIN — QUETIAPINE FUMARATE 200 MILLIGRAM(S): 300 TABLET ORAL at 21:55

## 2024-12-02 RX ADMIN — ACETAMINOPHEN, DIPHENHYDRAMINE HCL, PHENYLEPHRINE HCL 6 MILLIGRAM(S): 325; 25; 5 TABLET ORAL at 21:55

## 2024-12-02 RX ADMIN — Medication 2 TABLET(S): at 21:55

## 2024-12-02 RX ADMIN — Medication 1 TABLET(S): at 21:55

## 2024-12-02 NOTE — BH INPATIENT PSYCHIATRY PROGRESS NOTE - CURRENT MEDICATION
MEDICATIONS  (STANDING):  cholecalciferol 1000 Unit(s) Oral daily  divalproex Sprinkle 1000 milliGRAM(s) Oral two times a day  glucagon  Injectable 1 milliGRAM(s) IntraMuscular once  levothyroxine 75 MICROGram(s) Oral daily  melatonin. 6 milliGRAM(s) Oral at bedtime  metFORMIN 1000 milliGRAM(s) Oral two times a day with meals  metoprolol succinate ER 50 milliGRAM(s) Oral daily  multivitamin 1 Tablet(s) Oral once  multivitamin 1 Tablet(s) Oral at bedtime  QUEtiapine 200 milliGRAM(s) Oral at bedtime  senna 2 Tablet(s) Oral at bedtime    MEDICATIONS  (PRN):  acetaminophen     Tablet .. 650 milliGRAM(s) Oral every 6 hours PRN Mild Pain (1 - 3)  albuterol    90 MICROgram(s) HFA Inhaler 2 Puff(s) Inhalation every 6 hours PRN Bronchospasm  aluminum hydroxide/magnesium hydroxide/simethicone Suspension 30 milliLiter(s) Oral every 4 hours PRN Dyspepsia  bisacodyl 5 milliGRAM(s) Oral daily PRN Constipation  dextrose Oral Gel 15 Gram(s) Oral once PRN hypoglycemia  dextrose Oral Gel 15 Gram(s) Oral once PRN Blood Glucose LESS THAN 70 milliGRAM(s)/deciliter  diphenhydrAMINE Elixir 12.5 milliGRAM(s) Oral every 6 hours PRN eps prevention  diphenhydrAMINE Injectable 12.5 milliGRAM(s) IntraMuscular once PRN EPS prevention  diphenhydrAMINE Injectable 12.5 milliGRAM(s) IntraMuscular once PRN EPS prevention  fluPHENAZine 5 milliGRAM(s) Oral every 6 hours PRN agitation  fluPHENAZine  Injectable 5 milliGRAM(s) IntraMuscular once PRN agitation  fluPHENAZine  Injectable 5 milliGRAM(s) IntraMuscular once PRN agitation  polyethylene glycol 3350 17 Gram(s) Oral daily PRN constipation

## 2024-12-02 NOTE — BH INPATIENT PSYCHIATRY PROGRESS NOTE - NSBHASSESSSUMMFT_PSY_ALL_CORE
Ms Azul is a 72 yo F w history of SCZ, Parkinson's dz, hypothyroidism, and multiple psych hospitalizations who presents for AMS. Pt transferred from [facility], where she was found to have fallen and was covered in urine. Pt has been hospitalized in [facility] with occasional stays at [facility] for medical concerns since the start of this year for psychosis. Per [facility] notes, pt appears to be awaiting transfer to [facility] hospital. Pt has been delusional at [facility] with recent delusions surrounding pregnancy. Current medications are Depakote 750 mg bid, Seroquel 50 mg bid, and Prolixin Dec 12.5 mg IM q2w (last dose 9/24).     In [facility] stroke code was negative for acute pathology. Pt refused MRI and neuro deemed it not necessary. EEG did not reveal seizure activity and toxic encephalopathy workup was also negative. Psychiatry continued Depakote 1000mg BID, Seroquel 75mg QHS and Prolixin Dec 12.5 mg IM q2w (last dose given on 10/9), next dose due 10/23/24. As per psych CL pt has been irritable, aggressive at times requiring PRN medications and restraints. Paranoid and disorganized.     On arrival to  pt continues to present paranoid delusions, mentions she was poisoned by nurses at [facility], continues to endorse delusions of pregnancy. Denies SI/HI. Requires inpatient level of care due to inability to care for herself or transition to outpatient level of care given severe episodes of agitation, aggression and disorganized behavior in the context of psychosis.        10/22 Clinical Update: Ms. Azul refused to have her vital signs taken this morning. No behavioral outbursts or aggression reported. Bloodwork reviewed and WNL. Valproic acid level within therapeutic range at 65.70. Pt is visible in the dayroom today. Seems sedated, dozing off during interview. May need Seroquel dose to be adjusted to minimize daytime sedation. Due for Prolixin dec tomorrow.    10/23: Refused VS, irritable on approach, will offer Prolixin Dec tomorrow if more agreeable.  Did take PO meds.    10/24: Episodic agitation, did require IM prn last night.  Refused SHAFER and declined AM meds.  10/25: Remains irritable, refusing meds and VS, declines SHAFER, will likely require MOO.  10/28 Patient psychotic tenuous will increase Seroquel request transfer hearing start TOO, change prn to Prolixin Benadryl as never has had much response to olanzapine  10/29 Somewhat calmer today possibly from prn and taking Seroquel last night but refused BP meds, and Depakote which may affect medical condition encourage compliance placed MOO paperwork  10/30 Selectively compliant with medications. Remains illogical, paranoid, uncooperative with care  10/31 Psychotic disorganized needing prns taking medical meds part of time. Cont meds plan TOO hearing  11/1 Calmer at moment but quite psychotic, compliance with medical and psych meds very erratic Cont meds and plan for TOO hearing  11/2: Noncompliant with medications, uncooperative with unit rules and ADL care.   11/3: Remains paranoid  11/4 Agitated tenuous poorly compliant will go to court for TOO, will move Seroquel to all PM at her request with hope this may improve compliance  11/5 Somewhat calmer today no prns, very psychotic, erratic compliance. Will offer decanoate today  she is due, plan court for TOO in AM.  11/6 Patient psychotic, not much change will give Prolixin  18.75mg today, Cont to titrate Seroquel but only hs at her request.   11/7 Patient irritable, aggressive delusional. Cont decanoate along with increasing Seroquel and using prns , await state transfer  11/8 Patient irritable more negativistic today do not feel though she has catatonic mutism as she is talking to other staff and peers other than writer. Cont to titrate Seroquel if she is taking regularly. If she refuses Depakote regularly may need to eval for transfer for IV anti convulsants.  11/9 guarded, easily irritable but more visible, no acute events overnight/this am, partially compliant with medical meds. No SI/HI reported.   11/12 guarded, uncooperative, disorganized and irritable, partially cooperative with meds, refuses vitals.   11/13 needed PRN IM and restraints for agitation, physical aggression, calmer later on. Remains delusional, disorganized and labile, partially compliant with medications.  11/14 no overnight events, no PRN needed, refused am meds, complied with hs seroquel. Remains guarded, uncooperative and dismissive, no suicidal/homicidal behavior.   11/15 no acute outbursts, no PRN needed. She does not think seroquel is helpful, only partially compliant with medications, no SI/HI. She spoke to  about abilify, to review her past medications trials.  11/18 Cont current meds including Shafer when due will re-eval Seroquel though other options limited will get ua and if patient will allow will add prn laxatives  11/19 Psychotic not much change level of agitation fluctuates while she regularly expresses paranoia and delusions of pregnancy. Cont current meds encourage compliant encourage allowing assist with ADL's. UA and C and S re-ordered  11/20 Not much change cont resistive to help with hygiene, delusional, irritable. Expressing willingness to try Abilify to [facility], will explore with patient  11/21 Patient calmer today possibly related to prn. Patient expressing willingness to try Abilify but based on past she rarely follows through. Cont current meds for now. U/A neg  no evidence UTI Incontinence likely mix of anatomical and behavioral factors  11/22: More subdued possibly some incremental benefit from regular dosing of decanoate. Will ocntinue will cont Seroquel rather than make changes when patient showing some gains, Will sl increase Seroquel as she appeared in past to benefit from 300mg/d  11/25 Modest gains overall with ongoing epsiodic agitation. Increase Seroquel give next dec of Prolixin at week 3. Ordered podiatry clinic.   11/26 Some gains with administrsation of dec next due 11/29. Plan further titration Seroquel  11/27 Modst gains. Next dec 11/29, plan further titration Seroquel  11/29 Remains with refractory illness. Cont meds, for dec today, plan further titration of Seroquel  12/2 Patient without much change, psychotic, irritable, tenuous at times. COnt treatment consider increasing Seroquel. Will ask PT to re-eval walker generally viewed as not appropriate as she may use it in unsafe way.  Plan:  -[facility] 2S under 2PC legal status  -Routine checks  -Bed block  -Continue Depakote 1000mg BID  -Continue Seroquel 150 mg QHS  -Prolixin Dec 18.75 mg received on 11/6/24.  -Neuro workup at [facility] negative for seizure activity or acute stroke

## 2024-12-02 NOTE — BH INPATIENT PSYCHIATRY PROGRESS NOTE - NSBHMETABOLIC_PSY_ALL_CORE_FT
BMI: BMI (kg/m2): 29.7 (10-21-24 @ 16:58)  HbA1c: A1C with Estimated Average Glucose Result: 7.3 % (09-29-24 @ 17:15)    Glucose: POCT Blood Glucose.: 168 mg/dL (11-29-24 @ 07:55)    BP: 132/98 (11-30-24 @ 21:11) (132/98 - 132/98)Vital Signs Last 24 Hrs  T(C): --  T(F): --  HR: --  BP: --  BP(mean): --  RR: --  SpO2: --      Lipid Panel: Date/Time: 09-30-24 @ 04:45  Cholesterol, Serum: 148  LDL Cholesterol Calculated: 64  HDL Cholesterol, Serum: 56  Total Cholesterol/HDL Ration Measurement: --  Triglycerides, Serum: 138

## 2024-12-03 PROCEDURE — 99232 SBSQ HOSP IP/OBS MODERATE 35: CPT

## 2024-12-03 RX ADMIN — QUETIAPINE FUMARATE 200 MILLIGRAM(S): 300 TABLET ORAL at 21:55

## 2024-12-03 RX ADMIN — Medication 1000 MILLIGRAM(S): at 21:56

## 2024-12-03 RX ADMIN — Medication 1 TABLET(S): at 21:55

## 2024-12-03 RX ADMIN — ACETAMINOPHEN, DIPHENHYDRAMINE HCL, PHENYLEPHRINE HCL 6 MILLIGRAM(S): 325; 25; 5 TABLET ORAL at 21:55

## 2024-12-03 RX ADMIN — LEVOTHYROXINE SODIUM 75 MICROGRAM(S): 25 TABLET ORAL at 06:54

## 2024-12-03 NOTE — BH INPATIENT PSYCHIATRY PROGRESS NOTE - CURRENT MEDICATION
MEDICATIONS  (STANDING):  cholecalciferol 1000 Unit(s) Oral daily  divalproex Sprinkle 1000 milliGRAM(s) Oral two times a day  glucagon  Injectable 1 milliGRAM(s) IntraMuscular once  levothyroxine 75 MICROGram(s) Oral daily  melatonin. 6 milliGRAM(s) Oral at bedtime  metFORMIN 1000 milliGRAM(s) Oral two times a day with meals  metoprolol succinate ER 50 milliGRAM(s) Oral daily  multivitamin 1 Tablet(s) Oral at bedtime  QUEtiapine 200 milliGRAM(s) Oral at bedtime  senna 2 Tablet(s) Oral at bedtime    MEDICATIONS  (PRN):  acetaminophen     Tablet .. 650 milliGRAM(s) Oral every 6 hours PRN Mild Pain (1 - 3)  albuterol    90 MICROgram(s) HFA Inhaler 2 Puff(s) Inhalation every 6 hours PRN Bronchospasm  aluminum hydroxide/magnesium hydroxide/simethicone Suspension 30 milliLiter(s) Oral every 4 hours PRN Dyspepsia  bisacodyl 5 milliGRAM(s) Oral daily PRN Constipation  dextrose Oral Gel 15 Gram(s) Oral once PRN Blood Glucose LESS THAN 70 milliGRAM(s)/deciliter  dextrose Oral Gel 15 Gram(s) Oral once PRN hypoglycemia  diphenhydrAMINE Elixir 12.5 milliGRAM(s) Oral every 6 hours PRN eps prevention  diphenhydrAMINE Injectable 12.5 milliGRAM(s) IntraMuscular once PRN EPS prevention  diphenhydrAMINE Injectable 12.5 milliGRAM(s) IntraMuscular once PRN EPS prevention  fluPHENAZine 5 milliGRAM(s) Oral every 6 hours PRN agitation  fluPHENAZine  Injectable 5 milliGRAM(s) IntraMuscular once PRN agitation  fluPHENAZine  Injectable 5 milliGRAM(s) IntraMuscular once PRN agitation  polyethylene glycol 3350 17 Gram(s) Oral daily PRN constipation

## 2024-12-03 NOTE — BH INPATIENT PSYCHIATRY PROGRESS NOTE - NSBHASSESSSUMMFT_PSY_ALL_CORE
Ms Azul is a 72 yo F w history of SCZ, Parkinson's dz, hypothyroidism, and multiple psych hospitalizations who presents for AMS. Pt transferred from [facility], where she was found to have fallen and was covered in urine. Pt has been hospitalized in [facility] with occasional stays at [facility] for medical concerns since the start of this year for psychosis. Per [facility] notes, pt appears to be awaiting transfer to [facility] hospital. Pt has been delusional at [facility] with recent delusions surrounding pregnancy. Current medications are Depakote 750 mg bid, Seroquel 50 mg bid, and Prolixin Dec 12.5 mg IM q2w (last dose 9/24).     In [facility] stroke code was negative for acute pathology. Pt refused MRI and neuro deemed it not necessary. EEG did not reveal seizure activity and toxic encephalopathy workup was also negative. Psychiatry continued Depakote 1000mg BID, Seroquel 75mg QHS and Prolixin Dec 12.5 mg IM q2w (last dose given on 10/9), next dose due 10/23/24. As per psych CL pt has been irritable, aggressive at times requiring PRN medications and restraints. Paranoid and disorganized.     On arrival to  pt continues to present paranoid delusions, mentions she was poisoned by nurses at [facility], continues to endorse delusions of pregnancy. Denies SI/HI. Requires inpatient level of care due to inability to care for herself or transition to outpatient level of care given severe episodes of agitation, aggression and disorganized behavior in the context of psychosis.        10/22 Clinical Update: Ms. Azul refused to have her vital signs taken this morning. No behavioral outbursts or aggression reported. Bloodwork reviewed and WNL. Valproic acid level within therapeutic range at 65.70. Pt is visible in the dayroom today. Seems sedated, dozing off during interview. May need Seroquel dose to be adjusted to minimize daytime sedation. Due for Prolixin dec tomorrow.    10/23: Refused VS, irritable on approach, will offer Prolixin Dec tomorrow if more agreeable.  Did take PO meds.    10/24: Episodic agitation, did require IM prn last night.  Refused SHAFER and declined AM meds.  10/25: Remains irritable, refusing meds and VS, declines SHAFER, will likely require MOO.  10/28 Patient psychotic tenuous will increase Seroquel request transfer hearing start TOO, change prn to Prolixin Benadryl as never has had much response to olanzapine  10/29 Somewhat calmer today possibly from prn and taking Seroquel last night but refused BP meds, and Depakote which may affect medical condition encourage compliance placed MOO paperwork  10/30 Selectively compliant with medications. Remains illogical, paranoid, uncooperative with care  10/31 Psychotic disorganized needing prns taking medical meds part of time. Cont meds plan TOO hearing  11/1 Calmer at moment but quite psychotic, compliance with medical and psych meds very erratic Cont meds and plan for TOO hearing  11/2: Noncompliant with medications, uncooperative with unit rules and ADL care.   11/3: Remains paranoid  11/4 Agitated tenuous poorly compliant will go to court for TOO, will move Seroquel to all PM at her request with hope this may improve compliance  11/5 Somewhat calmer today no prns, very psychotic, erratic compliance. Will offer decanoate today  she is due, plan court for TOO in AM.  11/6 Patient psychotic, not much change will give Prolixin  18.75mg today, Cont to titrate Seroquel but only hs at her request.   11/7 Patient irritable, aggressive delusional. Cont decanoate along with increasing Seroquel and using prns , await state transfer  11/8 Patient irritable more negativistic today do not feel though she has catatonic mutism as she is talking to other staff and peers other than writer. Cont to titrate Seroquel if she is taking regularly. If she refuses Depakote regularly may need to eval for transfer for IV anti convulsants.  11/9 guarded, easily irritable but more visible, no acute events overnight/this am, partially compliant with medical meds. No SI/HI reported.   11/12 guarded, uncooperative, disorganized and irritable, partially cooperative with meds, refuses vitals.   11/13 needed PRN IM and restraints for agitation, physical aggression, calmer later on. Remains delusional, disorganized and labile, partially compliant with medications.  11/14 no overnight events, no PRN needed, refused am meds, complied with hs seroquel. Remains guarded, uncooperative and dismissive, no suicidal/homicidal behavior.   11/15 no acute outbursts, no PRN needed. She does not think seroquel is helpful, only partially compliant with medications, no SI/HI. She spoke to  about abilify, to review her past medications trials.  11/18 Cont current meds including Shafer when due will re-eval Seroquel though other options limited will get ua and if patient will allow will add prn laxatives  11/19 Psychotic not much change level of agitation fluctuates while she regularly expresses paranoia and delusions of pregnancy. Cont current meds encourage compliant encourage allowing assist with ADL's. UA and C and S re-ordered  11/20 Not much change cont resistive to help with hygiene, delusional, irritable. Expressing willingness to try Abilify to [facility], will explore with patient  11/21 Patient calmer today possibly related to prn. Patient expressing willingness to try Abilify but based on past she rarely follows through. Cont current meds for now. U/A neg  no evidence UTI Incontinence likely mix of anatomical and behavioral factors  11/22: More subdued possibly some incremental benefit from regular dosing of decanoate. Will ocntinue will cont Seroquel rather than make changes when patient showing some gains, Will sl increase Seroquel as she appeared in past to benefit from 300mg/d  11/25 Modest gains overall with ongoing epsiodic agitation. Increase Seroquel give next dec of Prolixin at week 3. Ordered podiatry clinic.   11/26 Some gains with administrsation of dec next due 11/29. Plan further titration Seroquel  11/27 Modst gains. Next dec 11/29, plan further titration Seroquel  11/29 Remains with refractory illness. Cont meds, for dec today, plan further titration of Seroquel  12/2 Patient without much change, psychotic, irritable, tenuous at times. Cont treatment consider increasing Seroquel. Will ask PT to re-eval walker generally viewed as not appropriate as she may use it in unsafe way.  12/23 Patient calmer with SHAFER. Cont current meds consider increasing Seroquel. ordered dental consult. Will cnsider speech consult with [facility] advancing diet  Plan:  -[facility] under [facility] legal status  -Routine checks  -Bed block  -Continue Depakote 1000mg BID  -Continue Seroquel 150 mg QHS  -Prolixin Dec 18.75 mg received on 11/6/24.  -Neuro workup at [facility] negative for seizure activity or acute stroke

## 2024-12-03 NOTE — BH INPATIENT PSYCHIATRY PROGRESS NOTE - NSBHMETABOLIC_PSY_ALL_CORE_FT
BMI: BMI (kg/m2): 29.7 (10-21-24 @ 16:58)  HbA1c: A1C with Estimated Average Glucose Result: 7.3 % (09-29-24 @ 17:15)    Glucose: POCT Blood Glucose.: 228 mg/dL (12-02-24 @ 15:41)    BP: 132/98 (11-30-24 @ 21:11) (132/98 - 132/98)Vital Signs Last 24 Hrs  T(C): --  T(F): --  HR: --  BP: --  BP(mean): --  RR: --  SpO2: --      Lipid Panel: Date/Time: 09-30-24 @ 04:45  Cholesterol, Serum: 148  LDL Cholesterol Calculated: 64  HDL Cholesterol, Serum: 56  Total Cholesterol/HDL Ration Measurement: --  Triglycerides, Serum: 138

## 2024-12-04 PROCEDURE — 99232 SBSQ HOSP IP/OBS MODERATE 35: CPT

## 2024-12-04 RX ADMIN — LEVOTHYROXINE SODIUM 75 MICROGRAM(S): 25 TABLET ORAL at 06:24

## 2024-12-04 RX ADMIN — ACETAMINOPHEN, DIPHENHYDRAMINE HCL, PHENYLEPHRINE HCL 6 MILLIGRAM(S): 325; 25; 5 TABLET ORAL at 21:50

## 2024-12-04 RX ADMIN — Medication 1000 MILLIGRAM(S): at 21:50

## 2024-12-04 RX ADMIN — QUETIAPINE FUMARATE 200 MILLIGRAM(S): 300 TABLET ORAL at 21:50

## 2024-12-04 RX ADMIN — Medication 1 TABLET(S): at 21:50

## 2024-12-04 NOTE — BH INPATIENT PSYCHIATRY PROGRESS NOTE - NSBHASSESSSUMMFT_PSY_ALL_CORE
Ms Azul is a 74 yo F w history of SCZ, Parkinson's dz, hypothyroidism, and multiple psych hospitalizations who presents for AMS. Pt transferred from [facility], where she was found to have fallen and was covered in urine. Pt has been hospitalized in [facility] with occasional stays at [facility] for medical concerns since the start of this year for psychosis. Per [facility] notes, pt appears to be awaiting transfer to [facility] hospital. Pt has been delusional at [facility] with recent delusions surrounding pregnancy. Current medications are Depakote 750 mg bid, Seroquel 50 mg bid, and Prolixin Dec 12.5 mg IM q2w (last dose 9/24).     In [facility] stroke code was negative for acute pathology. Pt refused MRI and neuro deemed it not necessary. EEG did not reveal seizure activity and toxic encephalopathy workup was also negative. Psychiatry continued Depakote 1000mg BID, Seroquel 75mg QHS and Prolixin Dec 12.5 mg IM q2w (last dose given on 10/9), next dose due 10/23/24. As per psych CL pt has been irritable, aggressive at times requiring PRN medications and restraints. Paranoid and disorganized.     On arrival to  pt continues to present paranoid delusions, mentions she was poisoned by nurses at [facility], continues to endorse delusions of pregnancy. Denies SI/HI. Requires inpatient level of care due to inability to care for herself or transition to outpatient level of care given severe episodes of agitation, aggression and disorganized behavior in the context of psychosis.        10/22 Clinical Update: Ms. Azul refused to have her vital signs taken this morning. No behavioral outbursts or aggression reported. Bloodwork reviewed and WNL. Valproic acid level within therapeutic range at 65.70. Pt is visible in the dayroom today. Seems sedated, dozing off during interview. May need Seroquel dose to be adjusted to minimize daytime sedation. Due for Prolixin dec tomorrow.    10/23: Refused VS, irritable on approach, will offer Prolixin Dec tomorrow if more agreeable.  Did take PO meds.    10/24: Episodic agitation, did require IM prn last night.  Refused SHAFER and declined AM meds.  10/25: Remains irritable, refusing meds and VS, declines SHAFER, will likely require MOO.  10/28 Patient psychotic tenuous will increase Seroquel request transfer hearing start TOO, change prn to Prolixin Benadryl as never has had much response to olanzapine  10/29 Somewhat calmer today possibly from prn and taking Seroquel last night but refused BP meds, and Depakote which may affect medical condition encourage compliance placed MOO paperwork  10/30 Selectively compliant with medications. Remains illogical, paranoid, uncooperative with care  10/31 Psychotic disorganized needing prns taking medical meds part of time. Cont meds plan TOO hearing  11/1 Calmer at moment but quite psychotic, compliance with medical and psych meds very erratic Cont meds and plan for TOO hearing  11/2: Noncompliant with medications, uncooperative with unit rules and ADL care.   11/3: Remains paranoid  11/4 Agitated tenuous poorly compliant will go to court for TOO, will move Seroquel to all PM at her request with hope this may improve compliance  11/5 Somewhat calmer today no prns, very psychotic, erratic compliance. Will offer decanoate today  she is due, plan court for TOO in AM.  11/6 Patient psychotic, not much change will give Prolixin  18.75mg today, Cont to titrate Seroquel but only hs at her request.   11/7 Patient irritable, aggressive delusional. Cont decanoate along with increasing Seroquel and using prns , await state transfer  11/8 Patient irritable more negativistic today do not feel though she has catatonic mutism as she is talking to other staff and peers other than writer. Cont to titrate Seroquel if she is taking regularly. If she refuses Depakote regularly may need to eval for transfer for IV anti convulsants.  11/9 guarded, easily irritable but more visible, no acute events overnight/this am, partially compliant with medical meds. No SI/HI reported.   11/12 guarded, uncooperative, disorganized and irritable, partially cooperative with meds, refuses vitals.   11/13 needed PRN IM and restraints for agitation, physical aggression, calmer later on. Remains delusional, disorganized and labile, partially compliant with medications.  11/14 no overnight events, no PRN needed, refused am meds, complied with hs seroquel. Remains guarded, uncooperative and dismissive, no suicidal/homicidal behavior.   11/15 no acute outbursts, no PRN needed. She does not think seroquel is helpful, only partially compliant with medications, no SI/HI. She spoke to  about abilify, to review her past medications trials.  11/18 Cont current meds including Shafer when due will re-eval Seroquel though other options limited will get ua and if patient will allow will add prn laxatives  11/19 Psychotic not much change level of agitation fluctuates while she regularly expresses paranoia and delusions of pregnancy. Cont current meds encourage compliant encourage allowing assist with ADL's. UA and C and S re-ordered  11/20 Not much change cont resistive to help with hygiene, delusional, irritable. Expressing willingness to try Abilify to [facility], will explore with patient  11/21 Patient calmer today possibly related to prn. Patient expressing willingness to try Abilify but based on past she rarely follows through. Cont current meds for now. U/A neg  no evidence UTI Incontinence likely mix of anatomical and behavioral factors  11/22: More subdued possibly some incremental benefit from regular dosing of decanoate. Will ocntinue will cont Seroquel rather than make changes when patient showing some gains, Will sl increase Seroquel as she appeared in past to benefit from 300mg/d  11/25 Modest gains overall with ongoing epsiodic agitation. Increase Seroquel give next dec of Prolixin at week 3. Ordered podiatry clinic.   11/26 Some gains with administrsation of dec next due 11/29. Plan further titration Seroquel  11/27 Modst gains. Next dec 11/29, plan further titration Seroquel  11/29 Remains with refractory illness. Cont meds, for dec today, plan further titration of Seroquel  12/2 Patient without much change, psychotic, irritable, tenuous at times. Cont treatment consider increasing Seroquel. Will ask PT to re-eval walker generally viewed as not appropriate as she may use it in unsafe way.  12/3 Patient calmer with SHAFER. Cont current meds consider increasing Seroquel. ordered dental consult. Will consider speech consult with [facility]  12/4 Patient w/o much changed, scheduled for dental appointment. Not considered safe to give walker due to risk of aggressionWill get labs in AM on routine basis  Plan:  -[facility] 2S under [facility]PC legal status  -Routine checks  -Bed block  -Continue Depakote 1000mg BID  -Continue Seroquel 150 mg QHS  -Prolixin Dec 18.75 mg received on 11/6/24.  -Neuro workup at [facility] negative for seizure activity or acute stroke

## 2024-12-04 NOTE — BH INPATIENT PSYCHIATRY PROGRESS NOTE - NSBHMETABOLIC_PSY_ALL_CORE_FT
BMI: BMI (kg/m2): 29.7 (10-21-24 @ 16:58)  HbA1c: A1C with Estimated Average Glucose Result: 7.3 % (09-29-24 @ 17:15)    Glucose: POCT Blood Glucose.: 228 mg/dL (12-02-24 @ 15:41)    BP: 152/75 (12-03-24 @ 07:47) (152/75 - 152/75)Vital Signs Last 24 Hrs  T(C): --  T(F): --  HR: --  BP: --  BP(mean): --  RR: --  SpO2: --      Lipid Panel: Date/Time: 09-30-24 @ 04:45  Cholesterol, Serum: 148  LDL Cholesterol Calculated: 64  HDL Cholesterol, Serum: 56  Total Cholesterol/HDL Ration Measurement: --  Triglycerides, Serum: 138

## 2024-12-05 LAB
ALBUMIN SERPL ELPH-MCNC: 4 G/DL — SIGNIFICANT CHANGE UP (ref 3.3–5)
ALP SERPL-CCNC: 98 U/L — SIGNIFICANT CHANGE UP (ref 40–120)
ALT FLD-CCNC: 16 U/L — SIGNIFICANT CHANGE UP (ref 4–33)
ANION GAP SERPL CALC-SCNC: 13 MMOL/L — SIGNIFICANT CHANGE UP (ref 7–14)
AST SERPL-CCNC: 23 U/L — SIGNIFICANT CHANGE UP (ref 4–32)
BASOPHILS # BLD AUTO: 0.05 K/UL — SIGNIFICANT CHANGE UP (ref 0–0.2)
BASOPHILS NFR BLD AUTO: 0.9 % — SIGNIFICANT CHANGE UP (ref 0–2)
BILIRUB SERPL-MCNC: <0.2 MG/DL — SIGNIFICANT CHANGE UP (ref 0.2–1.2)
BUN SERPL-MCNC: 22 MG/DL — SIGNIFICANT CHANGE UP (ref 7–23)
CALCIUM SERPL-MCNC: 9.5 MG/DL — SIGNIFICANT CHANGE UP (ref 8.4–10.5)
CHLORIDE SERPL-SCNC: 103 MMOL/L — SIGNIFICANT CHANGE UP (ref 98–107)
CO2 SERPL-SCNC: 24 MMOL/L — SIGNIFICANT CHANGE UP (ref 22–31)
CREAT SERPL-MCNC: 0.73 MG/DL — SIGNIFICANT CHANGE UP (ref 0.5–1.3)
EGFR: 88 ML/MIN/1.73M2 — SIGNIFICANT CHANGE UP
EOSINOPHIL # BLD AUTO: 0.24 K/UL — SIGNIFICANT CHANGE UP (ref 0–0.5)
EOSINOPHIL NFR BLD AUTO: 4.3 % — SIGNIFICANT CHANGE UP (ref 0–6)
GLUCOSE SERPL-MCNC: 262 MG/DL — HIGH (ref 70–99)
HCT VFR BLD CALC: 35.4 % — SIGNIFICANT CHANGE UP (ref 34.5–45)
HGB BLD-MCNC: 11.2 G/DL — LOW (ref 11.5–15.5)
IANC: 3.29 K/UL — SIGNIFICANT CHANGE UP (ref 1.8–7.4)
IMM GRANULOCYTES NFR BLD AUTO: 0.7 % — SIGNIFICANT CHANGE UP (ref 0–0.9)
LYMPHOCYTES # BLD AUTO: 1.26 K/UL — SIGNIFICANT CHANGE UP (ref 1–3.3)
LYMPHOCYTES # BLD AUTO: 22.7 % — SIGNIFICANT CHANGE UP (ref 13–44)
MCHC RBC-ENTMCNC: 27.7 PG — SIGNIFICANT CHANGE UP (ref 27–34)
MCHC RBC-ENTMCNC: 31.6 G/DL — LOW (ref 32–36)
MCV RBC AUTO: 87.6 FL — SIGNIFICANT CHANGE UP (ref 80–100)
MONOCYTES # BLD AUTO: 0.68 K/UL — SIGNIFICANT CHANGE UP (ref 0–0.9)
MONOCYTES NFR BLD AUTO: 12.2 % — SIGNIFICANT CHANGE UP (ref 2–14)
NEUTROPHILS # BLD AUTO: 3.29 K/UL — SIGNIFICANT CHANGE UP (ref 1.8–7.4)
NEUTROPHILS NFR BLD AUTO: 59.2 % — SIGNIFICANT CHANGE UP (ref 43–77)
NRBC # BLD AUTO: 0 K/UL — SIGNIFICANT CHANGE UP (ref 0–0)
NRBC # BLD: 0 /100 WBCS — SIGNIFICANT CHANGE UP (ref 0–0)
NRBC # FLD: 0 K/UL — SIGNIFICANT CHANGE UP (ref 0–0)
NRBC BLD-RTO: 0 /100 WBCS — SIGNIFICANT CHANGE UP (ref 0–0)
PLATELET # BLD AUTO: 203 K/UL — SIGNIFICANT CHANGE UP (ref 150–400)
POTASSIUM SERPL-MCNC: 4.5 MMOL/L — SIGNIFICANT CHANGE UP (ref 3.5–5.3)
POTASSIUM SERPL-SCNC: 4.5 MMOL/L — SIGNIFICANT CHANGE UP (ref 3.5–5.3)
PROT SERPL-MCNC: 7.5 G/DL — SIGNIFICANT CHANGE UP (ref 6–8.3)
RBC # BLD: 4.04 M/UL — SIGNIFICANT CHANGE UP (ref 3.8–5.2)
RBC # FLD: 15.6 % — HIGH (ref 10.3–14.5)
SODIUM SERPL-SCNC: 140 MMOL/L — SIGNIFICANT CHANGE UP (ref 135–145)
WBC # BLD: 5.56 K/UL — SIGNIFICANT CHANGE UP (ref 3.8–10.5)
WBC # FLD AUTO: 5.56 K/UL — SIGNIFICANT CHANGE UP (ref 3.8–10.5)

## 2024-12-05 PROCEDURE — 99232 SBSQ HOSP IP/OBS MODERATE 35: CPT

## 2024-12-05 RX ADMIN — METFORMIN HYDROCHLORIDE 1000 MILLIGRAM(S): 1000 TABLET, COATED ORAL at 16:53

## 2024-12-05 RX ADMIN — Medication 1 TABLET(S): at 21:39

## 2024-12-05 RX ADMIN — Medication 1000 MILLIGRAM(S): at 21:41

## 2024-12-05 RX ADMIN — QUETIAPINE FUMARATE 200 MILLIGRAM(S): 300 TABLET ORAL at 21:39

## 2024-12-05 NOTE — BH INPATIENT PSYCHIATRY PROGRESS NOTE - NSBHFUPINTERVALHXFT_PSY_A_CORE
Patient seen for schizoaffective disorder. Chart reviewed and discussed with nursing staff. Pt is only partially compliant with medications or takes much coaxing to comply but overall some gains in compliance. Irritable, verbally abusive but less overall agitation. Refused vitals. Eating sleeping well. Labs unremarkable except elevated glucose in same range as fs's

## 2024-12-05 NOTE — BH INPATIENT PSYCHIATRY PROGRESS NOTE - CURRENT MEDICATION
MEDICATIONS  (STANDING):  cholecalciferol 1000 Unit(s) Oral daily  divalproex Sprinkle 1000 milliGRAM(s) Oral two times a day  glucagon  Injectable 1 milliGRAM(s) IntraMuscular once  levothyroxine 75 MICROGram(s) Oral daily  melatonin. 6 milliGRAM(s) Oral at bedtime  metFORMIN 1000 milliGRAM(s) Oral two times a day with meals  metoprolol succinate ER 50 milliGRAM(s) Oral daily  multivitamin 1 Tablet(s) Oral at bedtime  QUEtiapine 200 milliGRAM(s) Oral at bedtime  senna 2 Tablet(s) Oral at bedtime    MEDICATIONS  (PRN):  acetaminophen     Tablet .. 650 milliGRAM(s) Oral every 6 hours PRN Mild Pain (1 - 3)  albuterol    90 MICROgram(s) HFA Inhaler 2 Puff(s) Inhalation every 6 hours PRN Bronchospasm  aluminum hydroxide/magnesium hydroxide/simethicone Suspension 30 milliLiter(s) Oral every 4 hours PRN Dyspepsia  bisacodyl 5 milliGRAM(s) Oral daily PRN Constipation  dextrose Oral Gel 15 Gram(s) Oral once PRN hypoglycemia  dextrose Oral Gel 15 Gram(s) Oral once PRN Blood Glucose LESS THAN 70 milliGRAM(s)/deciliter  diphenhydrAMINE Elixir 12.5 milliGRAM(s) Oral every 6 hours PRN eps prevention  diphenhydrAMINE Injectable 12.5 milliGRAM(s) IntraMuscular once PRN EPS prevention  diphenhydrAMINE Injectable 12.5 milliGRAM(s) IntraMuscular once PRN EPS prevention  fluPHENAZine 5 milliGRAM(s) Oral every 6 hours PRN agitation  fluPHENAZine  Injectable 5 milliGRAM(s) IntraMuscular once PRN agitation  fluPHENAZine  Injectable 5 milliGRAM(s) IntraMuscular once PRN agitation  polyethylene glycol 3350 17 Gram(s) Oral daily PRN constipation

## 2024-12-05 NOTE — BH INPATIENT PSYCHIATRY PROGRESS NOTE - NSBHASSESSSUMMFT_PSY_ALL_CORE
Ms Azul is a 74 yo F w history of SCZ, Parkinson's dz, hypothyroidism, and multiple psych hospitalizations who presents for AMS. Pt transferred from [facility], where she was found to have fallen and was covered in urine. Pt has been hospitalized in [facility] with occasional stays at [facility] for medical concerns since the start of this year for psychosis. Per [facility] notes, pt appears to be awaiting transfer to [facility] hospital. Pt has been delusional at [facility] with recent delusions surrounding pregnancy. Current medications are Depakote 750 mg bid, Seroquel 50 mg bid, and Prolixin Dec 12.5 mg IM q2w (last dose 9/24).     In [facility] stroke code was negative for acute pathology. Pt refused MRI and neuro deemed it not necessary. EEG did not reveal seizure activity and toxic encephalopathy workup was also negative. Psychiatry continued Depakote 1000mg BID, Seroquel 75mg QHS and Prolixin Dec 12.5 mg IM q2w (last dose given on 10/9), next dose due 10/23/24. As per psych CL pt has been irritable, aggressive at times requiring PRN medications and restraints. Paranoid and disorganized.     On arrival to  pt continues to present paranoid delusions, mentions she was poisoned by nurses at [facility], continues to endorse delusions of pregnancy. Denies SI/HI. Requires inpatient level of care due to inability to care for herself or transition to outpatient level of care given severe episodes of agitation, aggression and disorganized behavior in the context of psychosis.        10/22 Clinical Update: Ms. Azul refused to have her vital signs taken this morning. No behavioral outbursts or aggression reported. Bloodwork reviewed and WNL. Valproic acid level within therapeutic range at 65.70. Pt is visible in the dayroom today. Seems sedated, dozing off during interview. May need Seroquel dose to be adjusted to minimize daytime sedation. Due for Prolixin dec tomorrow.    10/23: Refused VS, irritable on approach, will offer Prolixin Dec tomorrow if more agreeable.  Did take PO meds.    10/24: Episodic agitation, did require IM prn last night.  Refused SHAFER and declined AM meds.  10/25: Remains irritable, refusing meds and VS, declines SHAFER, will likely require MOO.  10/28 Patient psychotic tenuous will increase Seroquel request transfer hearing start TOO, change prn to Prolixin Benadryl as never has had much response to olanzapine  10/29 Somewhat calmer today possibly from prn and taking Seroquel last night but refused BP meds, and Depakote which may affect medical condition encourage compliance placed MOO paperwork  10/30 Selectively compliant with medications. Remains illogical, paranoid, uncooperative with care  10/31 Psychotic disorganized needing prns taking medical meds part of time. Cont meds plan TOO hearing  11/1 Calmer at moment but quite psychotic, compliance with medical and psych meds very erratic Cont meds and plan for TOO hearing  11/2: Noncompliant with medications, uncooperative with unit rules and ADL care.   11/3: Remains paranoid  11/4 Agitated tenuous poorly compliant will go to court for TOO, will move Seroquel to all PM at her request with hope this may improve compliance  11/5 Somewhat calmer today no prns, very psychotic, erratic compliance. Will offer decanoate today  she is due, plan court for TOO in AM.  11/6 Patient psychotic, not much change will give Prolixin  18.75mg today, Cont to titrate Seroquel but only hs at her request.   11/7 Patient irritable, aggressive delusional. Cont decanoate along with increasing Seroquel and using prns , await state transfer  11/8 Patient irritable more negativistic today do not feel though she has catatonic mutism as she is talking to other staff and peers other than writer. Cont to titrate Seroquel if she is taking regularly. If she refuses Depakote regularly may need to eval for transfer for IV anti convulsants.  11/9 guarded, easily irritable but more visible, no acute events overnight/this am, partially compliant with medical meds. No SI/HI reported.   11/12 guarded, uncooperative, disorganized and irritable, partially cooperative with meds, refuses vitals.   11/13 needed PRN IM and restraints for agitation, physical aggression, calmer later on. Remains delusional, disorganized and labile, partially compliant with medications.  11/14 no overnight events, no PRN needed, refused am meds, complied with hs seroquel. Remains guarded, uncooperative and dismissive, no suicidal/homicidal behavior.   11/15 no acute outbursts, no PRN needed. She does not think seroquel is helpful, only partially compliant with medications, no SI/HI. She spoke to  about abilify, to review her past medications trials.  11/18 Cont current meds including Shafer when due will re-eval Seroquel though other options limited will get ua and if patient will allow will add prn laxatives  11/19 Psychotic not much change level of agitation fluctuates while she regularly expresses paranoia and delusions of pregnancy. Cont current meds encourage compliant encourage allowing assist with ADL's. UA and C and S re-ordered  11/20 Not much change cont resistive to help with hygiene, delusional, irritable. Expressing willingness to try Abilify to [facility], will explore with patient  11/21 Patient calmer today possibly related to prn. Patient expressing willingness to try Abilify but based on past she rarely follows through. Cont current meds for now. U/A neg  no evidence UTI Incontinence likely mix of anatomical and behavioral factors  11/22: More subdued possibly some incremental benefit from regular dosing of decanoate. Will ocntinue will cont Seroquel rather than make changes when patient showing some gains, Will sl increase Seroquel as she appeared in past to benefit from 300mg/d  11/25 Modest gains overall with ongoing epsiodic agitation. Increase Seroquel give next dec of Prolixin at week 3. Ordered podiatry clinic.   11/26 Some gains with administrsation of dec next due 11/29. Plan further titration Seroquel  11/27 Modst gains. Next dec 11/29, plan further titration Seroquel  11/29 Remains with refractory illness. Cont meds, for dec today, plan further titration of Seroquel  12/2 Patient without much change, psychotic, irritable, tenuous at times. Cont treatment consider increasing Seroquel. Will ask PT to re-eval walker generally viewed as not appropriate as she may use it in unsafe way.  12/3 Patient calmer with SHAFER. Cont current meds consider increasing Seroquel. ordered dental consult. Will consider speech consult with hope advancing diet  12/4 Patient w/o much changed, scheduled for dental appointment. Not considered safe to give walker due to risk of aggressionWill get labs in AM on routine   basis  12/5 PAtinet with some gains. Will increase Seroquel will have her seen in dental tomorrow. reviewed plan of care with sister  Plan:  -[facility] 2S under [facility]PC legal status  -Routine checks  -Bed block  -Continue Depakote 1000mg BID  -Continue Seroquel 150 mg QHS  -Prolixin Dec 18.75 mg received on 11/6/24.  -Neuro workup at [facility] negative for seizure activity or acute stroke

## 2024-12-06 ENCOUNTER — APPOINTMENT (OUTPATIENT)
Age: 74
End: 2024-12-06
Payer: COMMERCIAL

## 2024-12-06 PROCEDURE — D0140: CPT

## 2024-12-06 PROCEDURE — D0220: CPT

## 2024-12-06 PROCEDURE — 99232 SBSQ HOSP IP/OBS MODERATE 35: CPT

## 2024-12-06 RX ADMIN — Medication 50 MILLIGRAM(S): at 11:25

## 2024-12-06 RX ADMIN — ACETAMINOPHEN, DIPHENHYDRAMINE HCL, PHENYLEPHRINE HCL 6 MILLIGRAM(S): 325; 25; 5 TABLET ORAL at 19:59

## 2024-12-06 RX ADMIN — Medication 1000 MILLIGRAM(S): at 19:59

## 2024-12-06 RX ADMIN — Medication 1000 UNIT(S): at 11:24

## 2024-12-06 RX ADMIN — QUETIAPINE FUMARATE 200 MILLIGRAM(S): 300 TABLET ORAL at 19:59

## 2024-12-06 RX ADMIN — Medication 1000 MILLIGRAM(S): at 11:24

## 2024-12-06 RX ADMIN — METFORMIN HYDROCHLORIDE 1000 MILLIGRAM(S): 1000 TABLET, COATED ORAL at 11:23

## 2024-12-06 RX ADMIN — Medication 1 TABLET(S): at 19:59

## 2024-12-06 NOTE — BH INPATIENT PSYCHIATRY PROGRESS NOTE - NSBHFUPINTERVALHXFT_PSY_A_CORE
Patient seen for schizoaffective disorder. Chart reviewed and discussed with nursing staff. Pt is only partially compliant with medications or takes much coaxing to comply but overall some gains in compliance. Irritable, verbally abusive but less overall agitation. Refused vitals. Eating sleeping well. Labs unremarkable except elevated glucose in same range as fs'sSeen in dental extraction recommended but declined

## 2024-12-06 NOTE — BH INPATIENT PSYCHIATRY PROGRESS NOTE - NSBHASSESSSUMMFT_PSY_ALL_CORE
Ms Azul is a 74 yo F w history of SCZ, Parkinson's dz, hypothyroidism, and multiple psych hospitalizations who presents for AMS. Pt transferred from [facility], where she was found to have fallen and was covered in urine. Pt has been hospitalized in [facility] with occasional stays at [facility] for medical concerns since the start of this year for psychosis. Per [facility] notes, pt appears to be awaiting transfer to [facility] hospital. Pt has been delusional at [facility] with recent delusions surrounding pregnancy. Current medications are Depakote 750 mg bid, Seroquel 50 mg bid, and Prolixin Dec 12.5 mg IM q2w (last dose 9/24).     In [facility] stroke code was negative for acute pathology. Pt refused MRI and neuro deemed it not necessary. EEG did not reveal seizure activity and toxic encephalopathy workup was also negative. Psychiatry continued Depakote 1000mg BID, Seroquel 75mg QHS and Prolixin Dec 12.5 mg IM q2w (last dose given on 10/9), next dose due 10/23/24. As per psych CL pt has been irritable, aggressive at times requiring PRN medications and restraints. Paranoid and disorganized.     On arrival to  pt continues to present paranoid delusions, mentions she was poisoned by nurses at [facility], continues to endorse delusions of pregnancy. Denies SI/HI. Requires inpatient level of care due to inability to care for herself or transition to outpatient level of care given severe episodes of agitation, aggression and disorganized behavior in the context of psychosis.        10/22 Clinical Update: Ms. Azul refused to have her vital signs taken this morning. No behavioral outbursts or aggression reported. Bloodwork reviewed and WNL. Valproic acid level within therapeutic range at 65.70. Pt is visible in the dayroom today. Seems sedated, dozing off during interview. May need Seroquel dose to be adjusted to minimize daytime sedation. Due for Prolixin dec tomorrow.    10/23: Refused VS, irritable on approach, will offer Prolixin Dec tomorrow if more agreeable.  Did take PO meds.    10/24: Episodic agitation, did require IM prn last night.  Refused SHAFER and declined AM meds.  10/25: Remains irritable, refusing meds and VS, declines SHAFER, will likely require MOO.  10/28 Patient psychotic tenuous will increase Seroquel request transfer hearing start TOO, change prn to Prolixin Benadryl as never has had much response to olanzapine  10/29 Somewhat calmer today possibly from prn and taking Seroquel last night but refused BP meds, and Depakote which may affect medical condition encourage compliance placed MOO paperwork  10/30 Selectively compliant with medications. Remains illogical, paranoid, uncooperative with care  10/31 Psychotic disorganized needing prns taking medical meds part of time. Cont meds plan TOO hearing  11/1 Calmer at moment but quite psychotic, compliance with medical and psych meds very erratic Cont meds and plan for TOO hearing  11/2: Noncompliant with medications, uncooperative with unit rules and ADL care.   11/3: Remains paranoid  11/4 Agitated tenuous poorly compliant will go to court for TOO, will move Seroquel to all PM at her request with hope this may improve compliance  11/5 Somewhat calmer today no prns, very psychotic, erratic compliance. Will offer decanoate today  she is due, plan court for TOO in AM.  11/6 Patient psychotic, not much change will give Prolixin  18.75mg today, Cont to titrate Seroquel but only hs at her request.   11/7 Patient irritable, aggressive delusional. Cont decanoate along with increasing Seroquel and using prns , await state transfer  11/8 Patient irritable more negativistic today do not feel though she has catatonic mutism as she is talking to other staff and peers other than writer. Cont to titrate Seroquel if she is taking regularly. If she refuses Depakote regularly may need to eval for transfer for IV anti convulsants.  11/9 guarded, easily irritable but more visible, no acute events overnight/this am, partially compliant with medical meds. No SI/HI reported.   11/12 guarded, uncooperative, disorganized and irritable, partially cooperative with meds, refuses vitals.   11/13 needed PRN IM and restraints for agitation, physical aggression, calmer later on. Remains delusional, disorganized and labile, partially compliant with medications.  11/14 no overnight events, no PRN needed, refused am meds, complied with hs seroquel. Remains guarded, uncooperative and dismissive, no suicidal/homicidal behavior.   11/15 no acute outbursts, no PRN needed. She does not think seroquel is helpful, only partially compliant with medications, no SI/HI. She spoke to  about abilify, to review her past medications trials.  11/18 Cont current meds including Shafer when due will re-eval Seroquel though other options limited will get ua and if patient will allow will add prn laxatives  11/19 Psychotic not much change level of agitation fluctuates while she regularly expresses paranoia and delusions of pregnancy. Cont current meds encourage compliant encourage allowing assist with ADL's. UA and C and S re-ordered  11/20 Not much change cont resistive to help with hygiene, delusional, irritable. Expressing willingness to try Abilify to [facility], will explore with patient  11/21 Patient calmer today possibly related to prn. Patient expressing willingness to try Abilify but based on past she rarely follows through. Cont current meds for now. U/A neg  no evidence UTI Incontinence likely mix of anatomical and behavioral factors  11/22: More subdued possibly some incremental benefit from regular dosing of decanoate. Will ocntinue will cont Seroquel rather than make changes when patient showing some gains, Will sl increase Seroquel as she appeared in past to benefit from 300mg/d  11/25 Modest gains overall with ongoing epsiodic agitation. Increase Seroquel give next dec of Prolixin at week 3. Ordered podiatry clinic.   11/26 Some gains with administrsation of dec next due 11/29. Plan further titration Seroquel  11/27 Modst gains. Next dec 11/29, plan further titration Seroquel  11/29 Remains with refractory illness. Cont meds, for dec today, plan further titration of Seroquel  12/2 Patient without much change, psychotic, irritable, tenuous at times. Cont treatment consider increasing Seroquel. Will ask PT to re-eval walker generally viewed as not appropriate as she may use it in unsafe way.  12/3 Patient calmer with SHAFER. Cont current meds consider increasing Seroquel. ordered dental consult. Will consider speech consult with hope advancing diet  12/4 Patient w/o much changed, scheduled for dental appointment. Not considered safe to give walker due to risk of aggressionWill get labs in AM on routine   basis  12/5 Patient with some gains. Will increase Seroquel will have her seen in dental tomorrow. reviewed plan of care with sister  12/6 Patient improved modestly cont current meds plan to titrate Seroquel consider making Prolixin 25mg monthly to decrease number of injections can return to dental if agrees to extraction or any deterioration occurs  Plan:  -[facility] 2S under 2PC legal status  -Routine checks  -Bed block  -Continue Depakote 1000mg BID  -Continue Seroquel 150 mg QHS  -Prolixin Dec 18.75 mg received on 11/6/24.  -Neuro workup at [facility] negative for seizure activity or acute stroke

## 2024-12-06 NOTE — BH INPATIENT PSYCHIATRY PROGRESS NOTE - NSBHMETABOLIC_PSY_ALL_CORE_FT
BMI: BMI (kg/m2): 29.7 (10-21-24 @ 16:58)  HbA1c: A1C with Estimated Average Glucose Result: 7.3 % (09-29-24 @ 17:15)    Glucose: POCT Blood Glucose.: 228 mg/dL (12-02-24 @ 15:41)    BP: --Vital Signs Last 24 Hrs  T(C): --  T(F): --  HR: --  BP: --  BP(mean): --  RR: --  SpO2: --      Lipid Panel: Date/Time: 09-30-24 @ 04:45  Cholesterol, Serum: 148  LDL Cholesterol Calculated: 64  HDL Cholesterol, Serum: 56  Total Cholesterol/HDL Ration Measurement: --  Triglycerides, Serum: 138

## 2024-12-07 LAB — GLUCOSE BLDC GLUCOMTR-MCNC: 135 MG/DL — HIGH (ref 70–99)

## 2024-12-07 RX ADMIN — QUETIAPINE FUMARATE 200 MILLIGRAM(S): 300 TABLET ORAL at 19:56

## 2024-12-07 RX ADMIN — Medication 1000 MILLIGRAM(S): at 19:56

## 2024-12-07 RX ADMIN — Medication 1 TABLET(S): at 19:56

## 2024-12-07 RX ADMIN — ACETAMINOPHEN, DIPHENHYDRAMINE HCL, PHENYLEPHRINE HCL 6 MILLIGRAM(S): 325; 25; 5 TABLET ORAL at 19:56

## 2024-12-08 LAB — GLUCOSE BLDC GLUCOMTR-MCNC: 140 MG/DL — HIGH (ref 70–99)

## 2024-12-08 RX ADMIN — QUETIAPINE FUMARATE 200 MILLIGRAM(S): 300 TABLET ORAL at 21:47

## 2024-12-08 RX ADMIN — Medication 1000 MILLIGRAM(S): at 21:47

## 2024-12-08 RX ADMIN — Medication 1000 UNIT(S): at 09:31

## 2024-12-08 RX ADMIN — METFORMIN HYDROCHLORIDE 1000 MILLIGRAM(S): 1000 TABLET, COATED ORAL at 16:43

## 2024-12-08 RX ADMIN — Medication 1 TABLET(S): at 21:47

## 2024-12-09 PROCEDURE — 99232 SBSQ HOSP IP/OBS MODERATE 35: CPT

## 2024-12-09 RX ADMIN — Medication 1 TABLET(S): at 22:31

## 2024-12-09 RX ADMIN — ACETAMINOPHEN, DIPHENHYDRAMINE HCL, PHENYLEPHRINE HCL 6 MILLIGRAM(S): 325; 25; 5 TABLET ORAL at 22:30

## 2024-12-09 RX ADMIN — QUETIAPINE FUMARATE 200 MILLIGRAM(S): 300 TABLET ORAL at 22:30

## 2024-12-09 RX ADMIN — Medication 2 TABLET(S): at 22:30

## 2024-12-09 RX ADMIN — Medication 1000 MILLIGRAM(S): at 22:29

## 2024-12-09 NOTE — BH INPATIENT PSYCHIATRY PROGRESS NOTE - NSBHASSESSSUMMFT_PSY_ALL_CORE
Ms Azul is a 74 yo F w history of SCZ, Parkinson's dz, hypothyroidism, and multiple psych hospitalizations who presents for AMS. Pt transferred from [facility], where she was found to have fallen and was covered in urine. Pt has been hospitalized in [facility] with occasional stays at [facility] for medical concerns since the start of this year for psychosis. Per [facility] notes, pt appears to be awaiting transfer to [facility] hospital. Pt has been delusional at [facility] with recent delusions surrounding pregnancy. Current medications are Depakote 750 mg bid, Seroquel 50 mg bid, and Prolixin Dec 12.5 mg IM q2w (last dose 9/24).     In [facility] stroke code was negative for acute pathology. Pt refused MRI and neuro deemed it not necessary. EEG did not reveal seizure activity and toxic encephalopathy workup was also negative. Psychiatry continued Depakote 1000mg BID, Seroquel 75mg QHS and Prolixin Dec 12.5 mg IM q2w (last dose given on 10/9), next dose due 10/23/24. As per psych CL pt has been irritable, aggressive at times requiring PRN medications and restraints. Paranoid and disorganized.     On arrival to  pt continues to present paranoid delusions, mentions she was poisoned by nurses at [facility], continues to endorse delusions of pregnancy. Denies SI/HI. Requires inpatient level of care due to inability to care for herself or transition to outpatient level of care given severe episodes of agitation, aggression and disorganized behavior in the context of psychosis.        10/22 Clinical Update: Ms. Azul refused to have her vital signs taken this morning. No behavioral outbursts or aggression reported. Bloodwork reviewed and WNL. Valproic acid level within therapeutic range at 65.70. Pt is visible in the dayroom today. Seems sedated, dozing off during interview. May need Seroquel dose to be adjusted to minimize daytime sedation. Due for Prolixin dec tomorrow.    10/23: Refused VS, irritable on approach, will offer Prolixin Dec tomorrow if more agreeable.  Did take PO meds.    10/24: Episodic agitation, did require IM prn last night.  Refused SHAFER and declined AM meds.  10/25: Remains irritable, refusing meds and VS, declines SHAFER, will likely require MOO.  10/28 Patient psychotic tenuous will increase Seroquel request transfer hearing start TOO, change prn to Prolixin Benadryl as never has had much response to olanzapine  10/29 Somewhat calmer today possibly from prn and taking Seroquel last night but refused BP meds, and Depakote which may affect medical condition encourage compliance placed MOO paperwork  10/30 Selectively compliant with medications. Remains illogical, paranoid, uncooperative with care  10/31 Psychotic disorganized needing prns taking medical meds part of time. Cont meds plan TOO hearing  11/1 Calmer at moment but quite psychotic, compliance with medical and psych meds very erratic Cont meds and plan for TOO hearing  11/2: Noncompliant with medications, uncooperative with unit rules and ADL care.   11/3: Remains paranoid  11/4 Agitated tenuous poorly compliant will go to court for TOO, will move Seroquel to all PM at her request with hope this may improve compliance  11/5 Somewhat calmer today no prns, very psychotic, erratic compliance. Will offer decanoate today  she is due, plan court for TOO in AM.  11/6 Patient psychotic, not much change will give Prolixin  18.75mg today, Cont to titrate Seroquel but only hs at her request.   11/7 Patient irritable, aggressive delusional. Cont decanoate along with increasing Seroquel and using prns , await state transfer  11/8 Patient irritable more negativistic today do not feel though she has catatonic mutism as she is talking to other staff and peers other than writer. Cont to titrate Seroquel if she is taking regularly. If she refuses Depakote regularly may need to eval for transfer for IV anti convulsants.  11/9 guarded, easily irritable but more visible, no acute events overnight/this am, partially compliant with medical meds. No SI/HI reported.   11/12 guarded, uncooperative, disorganized and irritable, partially cooperative with meds, refuses vitals.   11/13 needed PRN IM and restraints for agitation, physical aggression, calmer later on. Remains delusional, disorganized and labile, partially compliant with medications.  11/14 no overnight events, no PRN needed, refused am meds, complied with hs seroquel. Remains guarded, uncooperative and dismissive, no suicidal/homicidal behavior.   11/15 no acute outbursts, no PRN needed. She does not think seroquel is helpful, only partially compliant with medications, no SI/HI. She spoke to  about abilify, to review her past medications trials.  11/18 Cont current meds including Shafer when due will re-eval Seroquel though other options limited will get ua and if patient will allow will add prn laxatives  11/19 Psychotic not much change level of agitation fluctuates while she regularly expresses paranoia and delusions of pregnancy. Cont current meds encourage compliant encourage allowing assist with ADL's. UA and C and S re-ordered  11/20 Not much change cont resistive to help with hygiene, delusional, irritable. Expressing willingness to try Abilify to [facility], will explore with patient  11/21 Patient calmer today possibly related to prn. Patient expressing willingness to try Abilify but based on past she rarely follows through. Cont current meds for now. U/A neg  no evidence UTI Incontinence likely mix of anatomical and behavioral factors  11/22: More subdued possibly some incremental benefit from regular dosing of decanoate. Will ocntinue will cont Seroquel rather than make changes when patient showing some gains, Will sl increase Seroquel as she appeared in past to benefit from 300mg/d  11/25 Modest gains overall with ongoing epsiodic agitation. Increase Seroquel give next dec of Prolixin at week 3. Ordered podiatry clinic.   11/26 Some gains with administrsation of dec next due 11/29. Plan further titration Seroquel  11/27 Modst gains. Next dec 11/29, plan further titration Seroquel  11/29 Remains with refractory illness. Cont meds, for dec today, plan further titration of Seroquel  12/2 Patient without much change, psychotic, irritable, tenuous at times. Cont treatment consider increasing Seroquel. Will ask PT to re-eval walker generally viewed as not appropriate as she may use it in unsafe way.  12/3 Patient calmer with SHAFER. Cont current meds consider increasing Seroquel. ordered dental consult. Will consider speech consult with hope advancing diet  12/4 Patient w/o much changed, scheduled for dental appointment. Not considered safe to give walker due to risk of aggression Will get labs in AM on routine   basis  12/5 Patient with some gains. Will increase Seroquel will have her seen in dental tomorrow. reviewed plan of care with sister  12/6 Patient improved modestly cont current meds plan to titrate Seroquel consider making Prolixin 25mg monthly to decrease number of injections can return to dental if agrees to extraction or any deterioration occurs  12/9 Patient partially improved cont current meds will increase dose of dec next shot to see if can give q4 weeks  Plan:  -[facility] 2S under 2PC legal status  -Routine checks  -Bed block  -Continue Depakote 1000mg BID  -Continue Seroquel 150 mg QHS  -Prolixin Dec 18.75 mg received on 11/6/24.  -Neuro workup at [facility] negative for seizure activity or acute stroke

## 2024-12-09 NOTE — BH INPATIENT PSYCHIATRY PROGRESS NOTE - NSBHMETABOLIC_PSY_ALL_CORE_FT
BMI: BMI (kg/m2): 29.7 (10-21-24 @ 16:58)  HbA1c: A1C with Estimated Average Glucose Result: 7.3 % (09-29-24 @ 17:15)    Glucose: POCT Blood Glucose.: 140 mg/dL (12-08-24 @ 11:49)    BP: --Vital Signs Last 24 Hrs  T(C): --  T(F): --  HR: --  BP: --  BP(mean): --  RR: --  SpO2: --      Lipid Panel: Date/Time: 09-30-24 @ 04:45  Cholesterol, Serum: 148  LDL Cholesterol Calculated: 64  HDL Cholesterol, Serum: 56  Total Cholesterol/HDL Ration Measurement: --  Triglycerides, Serum: 138

## 2024-12-10 PROCEDURE — 99232 SBSQ HOSP IP/OBS MODERATE 35: CPT

## 2024-12-10 RX ADMIN — ACETAMINOPHEN, DIPHENHYDRAMINE HCL, PHENYLEPHRINE HCL 6 MILLIGRAM(S): 325; 25; 5 TABLET ORAL at 23:43

## 2024-12-10 RX ADMIN — Medication 1000 MILLIGRAM(S): at 23:43

## 2024-12-10 RX ADMIN — Medication 1 TABLET(S): at 23:42

## 2024-12-10 RX ADMIN — QUETIAPINE FUMARATE 200 MILLIGRAM(S): 300 TABLET ORAL at 23:42

## 2024-12-10 NOTE — BH INPATIENT PSYCHIATRY PROGRESS NOTE - NSBHASSESSSUMMFT_PSY_ALL_CORE
Ms Azul is a 74 yo F w history of SCZ, Parkinson's dz, hypothyroidism, and multiple psych hospitalizations who presents for AMS. Pt transferred from [facility], where she was found to have fallen and was covered in urine. Pt has been hospitalized in [facility] with occasional stays at [facility] for medical concerns since the start of this year for psychosis. Per [facility] notes, pt appears to be awaiting transfer to [facility] hospital. Pt has been delusional at [facility] with recent delusions surrounding pregnancy. Current medications are Depakote 750 mg bid, Seroquel 50 mg bid, and Prolixin Dec 12.5 mg IM q2w (last dose 9/24).     In [facility] stroke code was negative for acute pathology. Pt refused MRI and neuro deemed it not necessary. EEG did not reveal seizure activity and toxic encephalopathy workup was also negative. Psychiatry continued Depakote 1000mg BID, Seroquel 75mg QHS and Prolixin Dec 12.5 mg IM q2w (last dose given on 10/9), next dose due 10/23/24. As per psych CL pt has been irritable, aggressive at times requiring PRN medications and restraints. Paranoid and disorganized.     On arrival to  pt continues to present paranoid delusions, mentions she was poisoned by nurses at [facility], continues to endorse delusions of pregnancy. Denies SI/HI. Requires inpatient level of care due to inability to care for herself or transition to outpatient level of care given severe episodes of agitation, aggression and disorganized behavior in the context of psychosis.        10/22 Clinical Update: Ms. Azul refused to have her vital signs taken this morning. No behavioral outbursts or aggression reported. Bloodwork reviewed and WNL. Valproic acid level within therapeutic range at 65.70. Pt is visible in the dayroom today. Seems sedated, dozing off during interview. May need Seroquel dose to be adjusted to minimize daytime sedation. Due for Prolixin dec tomorrow.    10/23: Refused VS, irritable on approach, will offer Prolixin Dec tomorrow if more agreeable.  Did take PO meds.    10/24: Episodic agitation, did require IM prn last night.  Refused SHAFER and declined AM meds.  10/25: Remains irritable, refusing meds and VS, declines SHAFER, will likely require MOO.  10/28 Patient psychotic tenuous will increase Seroquel request transfer hearing start TOO, change prn to Prolixin Benadryl as never has had much response to olanzapine  10/29 Somewhat calmer today possibly from prn and taking Seroquel last night but refused BP meds, and Depakote which may affect medical condition encourage compliance placed MOO paperwork  10/30 Selectively compliant with medications. Remains illogical, paranoid, uncooperative with care  10/31 Psychotic disorganized needing prns taking medical meds part of time. Cont meds plan TOO hearing  11/1 Calmer at moment but quite psychotic, compliance with medical and psych meds very erratic Cont meds and plan for TOO hearing  11/2: Noncompliant with medications, uncooperative with unit rules and ADL care.   11/3: Remains paranoid  11/4 Agitated tenuous poorly compliant will go to court for TOO, will move Seroquel to all PM at her request with hope this may improve compliance  11/5 Somewhat calmer today no prns, very psychotic, erratic compliance. Will offer decanoate today  she is due, plan court for TOO in AM.  11/6 Patient psychotic, not much change will give Prolixin  18.75mg today, Cont to titrate Seroquel but only hs at her request.   11/7 Patient irritable, aggressive delusional. Cont decanoate along with increasing Seroquel and using prns , await state transfer  11/8 Patient irritable more negativistic today do not feel though she has catatonic mutism as she is talking to other staff and peers other than writer. Cont to titrate Seroquel if she is taking regularly. If she refuses Depakote regularly may need to eval for transfer for IV anti convulsants.  11/9 guarded, easily irritable but more visible, no acute events overnight/this am, partially compliant with medical meds. No SI/HI reported.   11/12 guarded, uncooperative, disorganized and irritable, partially cooperative with meds, refuses vitals.   11/13 needed PRN IM and restraints for agitation, physical aggression, calmer later on. Remains delusional, disorganized and labile, partially compliant with medications.  11/14 no overnight events, no PRN needed, refused am meds, complied with hs seroquel. Remains guarded, uncooperative and dismissive, no suicidal/homicidal behavior.   11/15 no acute outbursts, no PRN needed. She does not think seroquel is helpful, only partially compliant with medications, no SI/HI. She spoke to  about abilify, to review her past medications trials.  11/18 Cont current meds including Shafer when due will re-eval Seroquel though other options limited will get ua and if patient will allow will add prn laxatives  11/19 Psychotic not much change level of agitation fluctuates while she regularly expresses paranoia and delusions of pregnancy. Cont current meds encourage compliant encourage allowing assist with ADL's. UA and C and S re-ordered  11/20 Not much change cont resistive to help with hygiene, delusional, irritable. Expressing willingness to try Abilify to [facility], will explore with patient  11/21 Patient calmer today possibly related to prn. Patient expressing willingness to try Abilify but based on past she rarely follows through. Cont current meds for now. U/A neg  no evidence UTI Incontinence likely mix of anatomical and behavioral factors  11/22: More subdued possibly some incremental benefit from regular dosing of decanoate. Will ocntinue will cont Seroquel rather than make changes when patient showing some gains, Will sl increase Seroquel as she appeared in past to benefit from 300mg/d  11/25 Modest gains overall with ongoing epsiodic agitation. Increase Seroquel give next dec of Prolixin at week 3. Ordered podiatry clinic.   11/26 Some gains with administrsation of dec next due 11/29. Plan further titration Seroquel  11/27 Modst gains. Next dec 11/29, plan further titration Seroquel  11/29 Remains with refractory illness. Cont meds, for dec today, plan further titration of Seroquel  12/2 Patient without much change, psychotic, irritable, tenuous at times. Cont treatment consider increasing Seroquel. Will ask PT to re-eval walker generally viewed as not appropriate as she may use it in unsafe way.  12/3 Patient calmer with SHAFER. Cont current meds consider increasing Seroquel. ordered dental consult. Will consider speech consult with hope advancing diet  12/4 Patient w/o much changed, scheduled for dental appointment. Not considered safe to give walker due to risk of aggression Will get labs in AM on routine   basis  12/5 Patient with some gains. Will increase Seroquel will have her seen in dental tomorrow. reviewed plan of care with sister  12/6 Patient improved modestly cont current meds plan to titrate Seroquel consider making Prolixin 25mg monthly to decrease number of injections can return to dental if agrees to extraction or any deterioration occurs  12/9 Patient partially improved cont current meds will increase dose of dec next shot to see if can give q4 weeks  12/10 Modest gains. Cont meds consider increasing Seroquel. Ordered cine to see if feasible to advance diet  Plan:  -[facility] 2S under 2PC legal status  -Routine checks  -Bed block  -Continue Depakote 1000mg BID  -Continue Seroquel 150 mg QHS  -Prolixin Dec 18.75 mg received on 11/6/24.  -Neuro workup at [facility] negative for seizure activity or acute stroke

## 2024-12-11 PROCEDURE — 99232 SBSQ HOSP IP/OBS MODERATE 35: CPT

## 2024-12-11 RX ORDER — FLUPHENAZINE HCL 5 MG
5 TABLET ORAL ONCE
Refills: 0 | Status: COMPLETED | OUTPATIENT
Start: 2024-12-11 | End: 2024-12-11

## 2024-12-11 RX ORDER — DIPHENHYDRAMINE HCL 25 MG
12.5 CAPSULE ORAL ONCE
Refills: 0 | Status: COMPLETED | OUTPATIENT
Start: 2024-12-11 | End: 2024-12-11

## 2024-12-11 RX ORDER — ACETAMINOPHEN, DIPHENHYDRAMINE HCL, PHENYLEPHRINE HCL 325; 25; 5 MG/1; MG/1; MG/1
3 TABLET ORAL AT BEDTIME
Refills: 0 | Status: DISCONTINUED | OUTPATIENT
Start: 2024-12-11 | End: 2025-01-29

## 2024-12-11 RX ADMIN — Medication 1 TABLET(S): at 22:29

## 2024-12-11 RX ADMIN — QUETIAPINE FUMARATE 200 MILLIGRAM(S): 300 TABLET ORAL at 22:29

## 2024-12-11 RX ADMIN — Medication 1000 MILLIGRAM(S): at 22:28

## 2024-12-11 RX ADMIN — Medication 12.5 MILLIGRAM(S): at 08:40

## 2024-12-11 RX ADMIN — Medication 5 MILLIGRAM(S): at 08:39

## 2024-12-11 NOTE — BH INPATIENT PSYCHIATRY PROGRESS NOTE - NSBHASSESSSUMMFT_PSY_ALL_CORE
Ms Azul is a 72 yo F w history of SCZ, Parkinson's dz, hypothyroidism, and multiple psych hospitalizations who presents for AMS. Pt transferred from [facility], where she was found to have fallen and was covered in urine. Pt has been hospitalized in [facility] with occasional stays at [facility] for medical concerns since the start of this year for psychosis. Per [facility] notes, pt appears to be awaiting transfer to [facility] hospital. Pt has been delusional at [facility] with recent delusions surrounding pregnancy. Current medications are Depakote 750 mg bid, Seroquel 50 mg bid, and Prolixin Dec 12.5 mg IM q2w (last dose 9/24).     In [facility] stroke code was negative for acute pathology. Pt refused MRI and neuro deemed it not necessary. EEG did not reveal seizure activity and toxic encephalopathy workup was also negative. Psychiatry continued Depakote 1000mg BID, Seroquel 75mg QHS and Prolixin Dec 12.5 mg IM q2w (last dose given on 10/9), next dose due 10/23/24. As per psych CL pt has been irritable, aggressive at times requiring PRN medications and restraints. Paranoid and disorganized.     On arrival to  pt continues to present paranoid delusions, mentions she was poisoned by nurses at [facility], continues to endorse delusions of pregnancy. Denies SI/HI. Requires inpatient level of care due to inability to care for herself or transition to outpatient level of care given severe episodes of agitation, aggression and disorganized behavior in the context of psychosis.        10/22 Clinical Update: Ms. Azul refused to have her vital signs taken this morning. No behavioral outbursts or aggression reported. Bloodwork reviewed and WNL. Valproic acid level within therapeutic range at 65.70. Pt is visible in the dayroom today. Seems sedated, dozing off during interview. May need Seroquel dose to be adjusted to minimize daytime sedation. Due for Prolixin dec tomorrow.    10/23: Refused VS, irritable on approach, will offer Prolixin Dec tomorrow if more agreeable.  Did take PO meds.    10/24: Episodic agitation, did require IM prn last night.  Refused SHAFER and declined AM meds.  10/25: Remains irritable, refusing meds and VS, declines SHAFER, will likely require MOO.  10/28 Patient psychotic tenuous will increase Seroquel request transfer hearing start TOO, change prn to Prolixin Benadryl as never has had much response to olanzapine  10/29 Somewhat calmer today possibly from prn and taking Seroquel last night but refused BP meds, and Depakote which may affect medical condition encourage compliance placed MOO paperwork  10/30 Selectively compliant with medications. Remains illogical, paranoid, uncooperative with care  10/31 Psychotic disorganized needing prns taking medical meds part of time. Cont meds plan TOO hearing  11/1 Calmer at moment but quite psychotic, compliance with medical and psych meds very erratic Cont meds and plan for TOO hearing  11/2: Noncompliant with medications, uncooperative with unit rules and ADL care.   11/3: Remains paranoid  11/4 Agitated tenuous poorly compliant will go to court for TOO, will move Seroquel to all PM at her request with hope this may improve compliance  11/5 Somewhat calmer today no prns, very psychotic, erratic compliance. Will offer decanoate today  she is due, plan court for TOO in AM.  11/6 Patient psychotic, not much change will give Prolixin  18.75mg today, Cont to titrate Seroquel but only hs at her request.   11/7 Patient irritable, aggressive delusional. Cont decanoate along with increasing Seroquel and using prns , await state transfer  11/8 Patient irritable more negativistic today do not feel though she has catatonic mutism as she is talking to other staff and peers other than writer. Cont to titrate Seroquel if she is taking regularly. If she refuses Depakote regularly may need to eval for transfer for IV anti convulsants.  11/9 guarded, easily irritable but more visible, no acute events overnight/this am, partially compliant with medical meds. No SI/HI reported.   11/12 guarded, uncooperative, disorganized and irritable, partially cooperative with meds, refuses vitals.   11/13 needed PRN IM and restraints for agitation, physical aggression, calmer later on. Remains delusional, disorganized and labile, partially compliant with medications.  11/14 no overnight events, no PRN needed, refused am meds, complied with hs seroquel. Remains guarded, uncooperative and dismissive, no suicidal/homicidal behavior.   11/15 no acute outbursts, no PRN needed. She does not think seroquel is helpful, only partially compliant with medications, no SI/HI. She spoke to  about abilify, to review her past medications trials.  11/18 Cont current meds including Shafer when due will re-eval Seroquel though other options limited will get ua and if patient will allow will add prn laxatives  11/19 Psychotic not much change level of agitation fluctuates while she regularly expresses paranoia and delusions of pregnancy. Cont current meds encourage compliant encourage allowing assist with ADL's. UA and C and S re-ordered  11/20 Not much change cont resistive to help with hygiene, delusional, irritable. Expressing willingness to try Abilify to [facility], will explore with patient  11/21 Patient calmer today possibly related to prn. Patient expressing willingness to try Abilify but based on past she rarely follows through. Cont current meds for now. U/A neg  no evidence UTI Incontinence likely mix of anatomical and behavioral factors  11/22: More subdued possibly some incremental benefit from regular dosing of decanoate. Will ocntinue will cont Seroquel rather than make changes when patient showing some gains, Will sl increase Seroquel as she appeared in past to benefit from 300mg/d  11/25 Modest gains overall with ongoing epsiodic agitation. Increase Seroquel give next dec of Prolixin at week 3. Ordered podiatry clinic.   11/26 Some gains with administrsation of dec next due 11/29. Plan further titration Seroquel  11/27 Modst gains. Next dec 11/29, plan further titration Seroquel  11/29 Remains with refractory illness. Cont meds, for dec today, plan further titration of Seroquel  12/2 Patient without much change, psychotic, irritable, tenuous at times. Cont treatment consider increasing Seroquel. Will ask PT to re-eval walker generally viewed as not appropriate as she may use it in unsafe way.  12/3 Patient calmer with SHAFER. Cont current meds consider increasing Seroquel. ordered dental consult. Will consider speech consult with hope advancing diet  12/4 Patient w/o much changed, scheduled for dental appointment. Not considered safe to give walker due to risk of aggression Will get labs in AM on routine   basis  12/5 Patient with some gains. Will increase Seroquel will have her seen in dental tomorrow. reviewed plan of care with sister  12/6 Patient improved modestly cont current meds plan to titrate Seroquel consider making Prolixin 25mg monthly to decrease number of injections can return to dental if agrees to extraction or any deterioration occurs  12/9 Patient partially improved cont current meds will increase dose of dec next shot to see if can give q4 weeks  12/10 Modest gains. Cont meds consider increasing Seroquel. Ordered cine to see if feasible to advance diet  12/11 Patient agitated and revealing new delusions. Plan cont to titrate Seroquel and Prolixin. will hold off on rescheduling cine to avoid inadvertently provoking her. Will lower melatonin as she blames this for sedating her leading t missed appointment  Plan:  -[facility] 2S under 2PC legal status  -Routine checks  -Bed block  -Continue Depakote 1000mg BID  -Continue Seroquel 150 mg QHS  -Prolixin Dec 18.75 mg received on 11/6/24.  -Neuro workup at [facility] negative for seizure activity or acute stroke

## 2024-12-11 NOTE — BH CHART NOTE - NSEVENTNOTEFT_PSY_ALL_CORE
Psych emergency called at 8:24. Per RN staff pt had an appointment this morning at [facility] but initially refused, later she changed her mind and became severely dysregulated when told that it had to be rescheduled. Pt was aggressive, combative and spitting at staff, refusing all PO meds. Pt required 4 pt restraints at 8:35 for continual agitation and non redirectable dangerous behaviors. STAT IM Prolixin 5mg/Benadryl 12.5mg administered at 8:40. After restraint placement, physical exam completed but limited due to ongoing dysregulation. Restraints placed appropriately on all extremities - strength and movement intact, neurovascularly intact. Pt breathing comfortably. Awake and alert, grossly oriented.     A/P:  Psych emergency called for patient agitation and violence towards staff requiring STAT IM medications and 4 point restraints. Pt placed comfortably in restraints, limited exam due to ongoing agitation but pt appears clinically stable with exam otherwise unremarkable.      Plan:   1. Will c/w restraints for threat of harm to self/others. Release patient in shortest time possible.   2. Vitals q2h, will reassess clinically qh for need to continue restraints.    3. d/w RN staff and primary team who agree with plan.

## 2024-12-11 NOTE — BH INPATIENT PSYCHIATRY PROGRESS NOTE - CURRENT MEDICATION
MEDICATIONS  (STANDING):  cholecalciferol 1000 Unit(s) Oral daily  divalproex Sprinkle 1000 milliGRAM(s) Oral two times a day  glucagon  Injectable 1 milliGRAM(s) IntraMuscular once  levothyroxine 75 MICROGram(s) Oral daily  melatonin. 3 milliGRAM(s) Oral at bedtime  metFORMIN 1000 milliGRAM(s) Oral two times a day with meals  metoprolol succinate ER 50 milliGRAM(s) Oral daily  multivitamin 1 Tablet(s) Oral at bedtime  QUEtiapine 200 milliGRAM(s) Oral at bedtime  senna 2 Tablet(s) Oral at bedtime    MEDICATIONS  (PRN):  acetaminophen     Tablet .. 650 milliGRAM(s) Oral every 6 hours PRN Mild Pain (1 - 3)  albuterol    90 MICROgram(s) HFA Inhaler 2 Puff(s) Inhalation every 6 hours PRN Bronchospasm  aluminum hydroxide/magnesium hydroxide/simethicone Suspension 30 milliLiter(s) Oral every 4 hours PRN Dyspepsia  bisacodyl 5 milliGRAM(s) Oral daily PRN Constipation  dextrose Oral Gel 15 Gram(s) Oral once PRN Blood Glucose LESS THAN 70 milliGRAM(s)/deciliter  dextrose Oral Gel 15 Gram(s) Oral once PRN hypoglycemia  diphenhydrAMINE Elixir 12.5 milliGRAM(s) Oral every 6 hours PRN eps prevention  diphenhydrAMINE Injectable 12.5 milliGRAM(s) IntraMuscular once PRN EPS prevention  fluPHENAZine 5 milliGRAM(s) Oral every 6 hours PRN agitation  fluPHENAZine  Injectable 5 milliGRAM(s) IntraMuscular once PRN agitation  polyethylene glycol 3350 17 Gram(s) Oral daily PRN constipation

## 2024-12-11 NOTE — BH INPATIENT PSYCHIATRY PROGRESS NOTE - NSBHFUPINTERVALHXFT_PSY_A_CORE
Patient seen for schizoaffective disorder. Chart reviewed and discussed with nursing staff. Pt is only partially compliant with medications or takes much coaxing to comply but overall some gains in compliance. Patient scheduled for cine but was not ready and lost appointment slot when told became severe agitated threatening to staff needed restraint and IM medication, calmed down and was released, refused vitals

## 2024-12-12 PROCEDURE — 99232 SBSQ HOSP IP/OBS MODERATE 35: CPT

## 2024-12-12 RX ADMIN — ACETAMINOPHEN, DIPHENHYDRAMINE HCL, PHENYLEPHRINE HCL 3 MILLIGRAM(S): 325; 25; 5 TABLET ORAL at 22:37

## 2024-12-12 RX ADMIN — QUETIAPINE FUMARATE 200 MILLIGRAM(S): 300 TABLET ORAL at 22:37

## 2024-12-12 RX ADMIN — Medication 1 TABLET(S): at 22:37

## 2024-12-12 RX ADMIN — Medication 1000 MILLIGRAM(S): at 22:37

## 2024-12-12 NOTE — BH INPATIENT PSYCHIATRY PROGRESS NOTE - CURRENT MEDICATION
MEDICATIONS  (STANDING):  cholecalciferol 1000 Unit(s) Oral daily  divalproex Sprinkle 1000 milliGRAM(s) Oral two times a day  glucagon  Injectable 1 milliGRAM(s) IntraMuscular once  levothyroxine 75 MICROGram(s) Oral daily  melatonin. 3 milliGRAM(s) Oral at bedtime  metFORMIN 1000 milliGRAM(s) Oral two times a day with meals  metoprolol succinate ER 50 milliGRAM(s) Oral daily  multivitamin 1 Tablet(s) Oral at bedtime  QUEtiapine 200 milliGRAM(s) Oral at bedtime  senna 2 Tablet(s) Oral at bedtime    MEDICATIONS  (PRN):  acetaminophen     Tablet .. 650 milliGRAM(s) Oral every 6 hours PRN Mild Pain (1 - 3)  albuterol    90 MICROgram(s) HFA Inhaler 2 Puff(s) Inhalation every 6 hours PRN Bronchospasm  aluminum hydroxide/magnesium hydroxide/simethicone Suspension 30 milliLiter(s) Oral every 4 hours PRN Dyspepsia  bisacodyl 5 milliGRAM(s) Oral daily PRN Constipation  dextrose Oral Gel 15 Gram(s) Oral once PRN hypoglycemia  dextrose Oral Gel 15 Gram(s) Oral once PRN Blood Glucose LESS THAN 70 milliGRAM(s)/deciliter  diphenhydrAMINE Elixir 12.5 milliGRAM(s) Oral every 6 hours PRN eps prevention  diphenhydrAMINE Injectable 12.5 milliGRAM(s) IntraMuscular once PRN EPS prevention  fluPHENAZine 5 milliGRAM(s) Oral every 6 hours PRN agitation  fluPHENAZine  Injectable 5 milliGRAM(s) IntraMuscular once PRN agitation  polyethylene glycol 3350 17 Gram(s) Oral daily PRN constipation

## 2024-12-12 NOTE — BH INPATIENT PSYCHIATRY PROGRESS NOTE - NSBHCHARTREVIEWVS_PSY_A_CORE FT
Vital Signs Last 24 Hrs  T(C): 36.2 (12-12-24 @ 07:51), Max: 36.2 (12-12-24 @ 07:51)  T(F): 97.1 (12-12-24 @ 07:51), Max: 97.1 (12-12-24 @ 07:51)  HR: --  BP: --  BP(mean): --  RR: --  SpO2: --    Orthostatic VS  12-12-24 @ 07:51  Lying BP: --/-- HR: --  Sitting BP: 146/75 HR: 91  Standing BP: 157/91 HR: 95  Site: --  Mode: --

## 2024-12-12 NOTE — BH INPATIENT PSYCHIATRY PROGRESS NOTE - NSBHMETABOLIC_PSY_ALL_CORE_FT
BMI: BMI (kg/m2): 29.7 (10-21-24 @ 16:58)  HbA1c: A1C with Estimated Average Glucose Result: 7.3 % (09-29-24 @ 17:15)    Glucose: POCT Blood Glucose.: 140 mg/dL (12-08-24 @ 11:49)    BP: --Vital Signs Last 24 Hrs  T(C): 36.2 (12-12-24 @ 07:51), Max: 36.2 (12-12-24 @ 07:51)  T(F): 97.1 (12-12-24 @ 07:51), Max: 97.1 (12-12-24 @ 07:51)  HR: --  BP: --  BP(mean): --  RR: --  SpO2: --    Orthostatic VS  12-12-24 @ 07:51  Lying BP: --/-- HR: --  Sitting BP: 146/75 HR: 91  Standing BP: 157/91 HR: 95  Site: --  Mode: --    Lipid Panel: Date/Time: 09-30-24 @ 04:45  Cholesterol, Serum: 148  LDL Cholesterol Calculated: 64  HDL Cholesterol, Serum: 56  Total Cholesterol/HDL Ration Measurement: --  Triglycerides, Serum: 138

## 2024-12-12 NOTE — BH INPATIENT PSYCHIATRY PROGRESS NOTE - NSBHASSESSSUMMFT_PSY_ALL_CORE
Ms Azul is a 74 yo F w history of SCZ, Parkinson's dz, hypothyroidism, and multiple psych hospitalizations who presents for AMS. Pt transferred from [facility], where she was found to have fallen and was covered in urine. Pt has been hospitalized in [facility] with occasional stays at [facility] for medical concerns since the start of this year for psychosis. Per [facility] notes, pt appears to be awaiting transfer to [facility] hospital. Pt has been delusional at [facility] with recent delusions surrounding pregnancy. Current medications are Depakote 750 mg bid, Seroquel 50 mg bid, and Prolixin Dec 12.5 mg IM q2w (last dose 9/24).     In [facility] stroke code was negative for acute pathology. Pt refused MRI and neuro deemed it not necessary. EEG did not reveal seizure activity and toxic encephalopathy workup was also negative. Psychiatry continued Depakote 1000mg BID, Seroquel 75mg QHS and Prolixin Dec 12.5 mg IM q2w (last dose given on 10/9), next dose due 10/23/24. As per psych CL pt has been irritable, aggressive at times requiring PRN medications and restraints. Paranoid and disorganized.     On arrival to  pt continues to present paranoid delusions, mentions she was poisoned by nurses at [facility], continues to endorse delusions of pregnancy. Denies SI/HI. Requires inpatient level of care due to inability to care for herself or transition to outpatient level of care given severe episodes of agitation, aggression and disorganized behavior in the context of psychosis.        10/22 Clinical Update: Ms. Azul refused to have her vital signs taken this morning. No behavioral outbursts or aggression reported. Bloodwork reviewed and WNL. Valproic acid level within therapeutic range at 65.70. Pt is visible in the dayroom today. Seems sedated, dozing off during interview. May need Seroquel dose to be adjusted to minimize daytime sedation. Due for Prolixin dec tomorrow.    10/23: Refused VS, irritable on approach, will offer Prolixin Dec tomorrow if more agreeable.  Did take PO meds.    10/24: Episodic agitation, did require IM prn last night.  Refused SHAFER and declined AM meds.  10/25: Remains irritable, refusing meds and VS, declines SHAFER, will likely require MOO.  10/28 Patient psychotic tenuous will increase Seroquel request transfer hearing start TOO, change prn to Prolixin Benadryl as never has had much response to olanzapine  10/29 Somewhat calmer today possibly from prn and taking Seroquel last night but refused BP meds, and Depakote which may affect medical condition encourage compliance placed MOO paperwork  10/30 Selectively compliant with medications. Remains illogical, paranoid, uncooperative with care  10/31 Psychotic disorganized needing prns taking medical meds part of time. Cont meds plan TOO hearing  11/1 Calmer at moment but quite psychotic, compliance with medical and psych meds very erratic Cont meds and plan for TOO hearing  11/2: Noncompliant with medications, uncooperative with unit rules and ADL care.   11/3: Remains paranoid  11/4 Agitated tenuous poorly compliant will go to court for TOO, will move Seroquel to all PM at her request with hope this may improve compliance  11/5 Somewhat calmer today no prns, very psychotic, erratic compliance. Will offer decanoate today  she is due, plan court for TOO in AM.  11/6 Patient psychotic, not much change will give Prolixin  18.75mg today, Cont to titrate Seroquel but only hs at her request.   11/7 Patient irritable, aggressive delusional. Cont decanoate along with increasing Seroquel and using prns , await state transfer  11/8 Patient irritable more negativistic today do not feel though she has catatonic mutism as she is talking to other staff and peers other than writer. Cont to titrate Seroquel if she is taking regularly. If she refuses Depakote regularly may need to eval for transfer for IV anti convulsants.  11/9 guarded, easily irritable but more visible, no acute events overnight/this am, partially compliant with medical meds. No SI/HI reported.   11/12 guarded, uncooperative, disorganized and irritable, partially cooperative with meds, refuses vitals.   11/13 needed PRN IM and restraints for agitation, physical aggression, calmer later on. Remains delusional, disorganized and labile, partially compliant with medications.  11/14 no overnight events, no PRN needed, refused am meds, complied with hs seroquel. Remains guarded, uncooperative and dismissive, no suicidal/homicidal behavior.   11/15 no acute outbursts, no PRN needed. She does not think seroquel is helpful, only partially compliant with medications, no SI/HI. She spoke to  about abilify, to review her past medications trials.  11/18 Cont current meds including Shafer when due will re-eval Seroquel though other options limited will get ua and if patient will allow will add prn laxatives  11/19 Psychotic not much change level of agitation fluctuates while she regularly expresses paranoia and delusions of pregnancy. Cont current meds encourage compliant encourage allowing assist with ADL's. UA and C and S re-ordered  11/20 Not much change cont resistive to help with hygiene, delusional, irritable. Expressing willingness to try Abilify to [facility], will explore with patient  11/21 Patient calmer today possibly related to prn. Patient expressing willingness to try Abilify but based on past she rarely follows through. Cont current meds for now. U/A neg  no evidence UTI Incontinence likely mix of anatomical and behavioral factors  11/22: More subdued possibly some incremental benefit from regular dosing of decanoate. Will ocntinue will cont Seroquel rather than make changes when patient showing some gains, Will sl increase Seroquel as she appeared in past to benefit from 300mg/d  11/25 Modest gains overall with ongoing epsiodic agitation. Increase Seroquel give next dec of Prolixin at week 3. Ordered podiatry clinic.   11/26 Some gains with administrsation of dec next due 11/29. Plan further titration Seroquel  11/27 Modst gains. Next dec 11/29, plan further titration Seroquel  11/29 Remains with refractory illness. Cont meds, for dec today, plan further titration of Seroquel  12/2 Patient without much change, psychotic, irritable, tenuous at times. Cont treatment consider increasing Seroquel. Will ask PT to re-eval walker generally viewed as not appropriate as she may use it in unsafe way.  12/3 Patient calmer with SHAFER. Cont current meds consider increasing Seroquel. ordered dental consult. Will consider speech consult with hope advancing diet  12/4 Patient w/o much changed, scheduled for dental appointment. Not considered safe to give walker due to risk of aggression Will get labs in AM on routine   basis  12/5 Patient with some gains. Will increase Seroquel will have her seen in dental tomorrow. reviewed plan of care with sister  12/6 Patient improved modestly cont current meds plan to titrate Seroquel consider making Prolixin 25mg monthly to decrease number of injections can return to dental if agrees to extraction or any deterioration occurs  12/9 Patient partially improved cont current meds will increase dose of dec next shot to see if can give q4 weeks  12/10 Modest gains. Cont meds consider increasing Seroquel. Ordered cine to see if feasible to advance diet  12/11 Patient agitated and revealing new delusions. Plan cont to titrate Seroquel and Prolixin. will hold off on rescheduling cine to avoid inadvertently provoking her. Will lower melatonin as she blames this for sedating her leading t missed appointment  12/12 Paranoid, somatic cont current meds.  Plan:  -[facility] 2S under 2PC legal status  -Routine checks  -Bed block  -Continue Depakote 1000mg BID  -Continue Seroquel 150 mg QHS  -Prolixin Dec 18.75 mg received on 11/6/24.  -Neuro workup at [facility] negative for seizure activity or acute stroke

## 2024-12-12 NOTE — BH INPATIENT PSYCHIATRY PROGRESS NOTE - NSBHFUPINTERVALHXFT_PSY_A_CORE
Patient seen for schizoaffective disorder. Chart reviewed and discussed with nursing staff. Pt is only partially compliant with medications or takes much coaxing Patient scheduled for cine but was not ready .  Patient seen for schizoaffective disorder. Chart reviewed and discussed with nursing staff. Pt is only partially compliant with medications or takes much coaxing Patient scheduled for cine but was not ready . VSS no low or high BP no sig ortho changes

## 2024-12-13 PROCEDURE — 99232 SBSQ HOSP IP/OBS MODERATE 35: CPT

## 2024-12-13 NOTE — BH INPATIENT PSYCHIATRY PROGRESS NOTE - NSBHASSESSSUMMFT_PSY_ALL_CORE
Ms Azul is a 74 yo F w history of SCZ, Parkinson's dz, hypothyroidism, and multiple psych hospitalizations who presents for AMS. Pt transferred from [facility], where she was found to have fallen and was covered in urine. Pt has been hospitalized in [facility] with occasional stays at [facility] for medical concerns since the start of this year for psychosis. Per [facility] notes, pt appears to be awaiting transfer to [facility] hospital. Pt has been delusional at [facility] with recent delusions surrounding pregnancy. Current medications are Depakote 750 mg bid, Seroquel 50 mg bid, and Prolixin Dec 12.5 mg IM q2w (last dose 9/24).     In [facility] stroke code was negative for acute pathology. Pt refused MRI and neuro deemed it not necessary. EEG did not reveal seizure activity and toxic encephalopathy workup was also negative. Psychiatry continued Depakote 1000mg BID, Seroquel 75mg QHS and Prolixin Dec 12.5 mg IM q2w (last dose given on 10/9), next dose due 10/23/24. As per psych CL pt has been irritable, aggressive at times requiring PRN medications and restraints. Paranoid and disorganized.     On arrival to  pt continues to present paranoid delusions, mentions she was poisoned by nurses at [facility], continues to endorse delusions of pregnancy. Denies SI/HI. Requires inpatient level of care due to inability to care for herself or transition to outpatient level of care given severe episodes of agitation, aggression and disorganized behavior in the context of psychosis.        10/22 Clinical Update: Ms. Azul refused to have her vital signs taken this morning. No behavioral outbursts or aggression reported. Bloodwork reviewed and WNL. Valproic acid level within therapeutic range at 65.70. Pt is visible in the dayroom today. Seems sedated, dozing off during interview. May need Seroquel dose to be adjusted to minimize daytime sedation. Due for Prolixin dec tomorrow.    10/23: Refused VS, irritable on approach, will offer Prolixin Dec tomorrow if more agreeable.  Did take PO meds.    10/24: Episodic agitation, did require IM prn last night.  Refused SHAFER and declined AM meds.  10/25: Remains irritable, refusing meds and VS, declines SHAFER, will likely require MOO.  10/28 Patient psychotic tenuous will increase Seroquel request transfer hearing start TOO, change prn to Prolixin Benadryl as never has had much response to olanzapine  10/29 Somewhat calmer today possibly from prn and taking Seroquel last night but refused BP meds, and Depakote which may affect medical condition encourage compliance placed MOO paperwork  10/30 Selectively compliant with medications. Remains illogical, paranoid, uncooperative with care  10/31 Psychotic disorganized needing prns taking medical meds part of time. Cont meds plan TOO hearing  11/1 Calmer at moment but quite psychotic, compliance with medical and psych meds very erratic Cont meds and plan for TOO hearing  11/2: Noncompliant with medications, uncooperative with unit rules and ADL care.   11/3: Remains paranoid  11/4 Agitated tenuous poorly compliant will go to court for TOO, will move Seroquel to all PM at her request with hope this may improve compliance  11/5 Somewhat calmer today no prns, very psychotic, erratic compliance. Will offer decanoate today  she is due, plan court for TOO in AM.  11/6 Patient psychotic, not much change will give Prolixin  18.75mg today, Cont to titrate Seroquel but only hs at her request.   11/7 Patient irritable, aggressive delusional. Cont decanoate along with increasing Seroquel and using prns , await state transfer  11/8 Patient irritable more negativistic today do not feel though she has catatonic mutism as she is talking to other staff and peers other than writer. Cont to titrate Seroquel if she is taking regularly. If she refuses Depakote regularly may need to eval for transfer for IV anti convulsants.  11/9 guarded, easily irritable but more visible, no acute events overnight/this am, partially compliant with medical meds. No SI/HI reported.   11/12 guarded, uncooperative, disorganized and irritable, partially cooperative with meds, refuses vitals.   11/13 needed PRN IM and restraints for agitation, physical aggression, calmer later on. Remains delusional, disorganized and labile, partially compliant with medications.  11/14 no overnight events, no PRN needed, refused am meds, complied with hs seroquel. Remains guarded, uncooperative and dismissive, no suicidal/homicidal behavior.   11/15 no acute outbursts, no PRN needed. She does not think seroquel is helpful, only partially compliant with medications, no SI/HI. She spoke to  about abilify, to review her past medications trials.  11/18 Cont current meds including Shafer when due will re-eval Seroquel though other options limited will get ua and if patient will allow will add prn laxatives  11/19 Psychotic not much change level of agitation fluctuates while she regularly expresses paranoia and delusions of pregnancy. Cont current meds encourage compliant encourage allowing assist with ADL's. UA and C and S re-ordered  11/20 Not much change cont resistive to help with hygiene, delusional, irritable. Expressing willingness to try Abilify to [facility], will explore with patient  11/21 Patient calmer today possibly related to prn. Patient expressing willingness to try Abilify but based on past she rarely follows through. Cont current meds for now. U/A neg  no evidence UTI Incontinence likely mix of anatomical and behavioral factors  11/22: More subdued possibly some incremental benefit from regular dosing of decanoate. Will ocntinue will cont Seroquel rather than make changes when patient showing some gains, Will sl increase Seroquel as she appeared in past to benefit from 300mg/d  11/25 Modest gains overall with ongoing epsiodic agitation. Increase Seroquel give next dec of Prolixin at week 3. Ordered podiatry clinic.   11/26 Some gains with administrsation of dec next due 11/29. Plan further titration Seroquel  11/27 Modst gains. Next dec 11/29, plan further titration Seroquel  11/29 Remains with refractory illness. Cont meds, for dec today, plan further titration of Seroquel  12/2 Patient without much change, psychotic, irritable, tenuous at times. Cont treatment consider increasing Seroquel. Will ask PT to re-eval walker generally viewed as not appropriate as she may use it in unsafe way.  12/3 Patient calmer with SHAFER. Cont current meds consider increasing Seroquel. ordered dental consult. Will consider speech consult with hope advancing diet  12/4 Patient w/o much changed, scheduled for dental appointment. Not considered safe to give walker due to risk of aggression Will get labs in AM on routine   basis  12/5 Patient with some gains. Will increase Seroquel will have her seen in dental tomorrow. reviewed plan of care with sister  12/6 Patient improved modestly cont current meds plan to titrate Seroquel consider making Prolixin 25mg monthly to decrease number of injections can return to dental if agrees to extraction or any deterioration occurs  12/9 Patient partially improved cont current meds will increase dose of dec next shot to see if can give q4 weeks  12/10 Modest gains. Cont meds consider increasing Seroquel. Ordered cine to see if feasible to advance diet  12/11 Patient agitated and revealing new delusions. Plan cont to titrate Seroquel and Prolixin. will hold off on rescheduling cine to avoid inadvertently provoking her. Will lower melatonin as she blames this for sedating her leading t missed appointment  12/12 Paranoid, somatic cont current meds.  12/13 Labile, with periods of agitation. Continue current medications   Plan:  -[facility] 2S under 2PC legal status  -Routine checks  -Bed block  -Continue Depakote 1000mg BID  -Continue Seroquel 150 mg QHS  -Prolixin Dec 18.75 mg received on 11/6/24.  -Neuro workup at [facility] negative for seizure activity or acute stroke

## 2024-12-13 NOTE — BH INPATIENT PSYCHIATRY PROGRESS NOTE - NSBHMETABOLIC_PSY_ALL_CORE_FT
BMI: BMI (kg/m2): 29.7 (10-21-24 @ 16:58)  HbA1c: A1C with Estimated Average Glucose Result: 7.3 % (09-29-24 @ 17:15)    Glucose: POCT Blood Glucose.: 140 mg/dL (12-08-24 @ 11:49)    BP: --Vital Signs Last 24 Hrs  T(C): --  T(F): --  HR: --  BP: --  BP(mean): --  RR: --  SpO2: --    Orthostatic VS  12-12-24 @ 07:51  Lying BP: --/-- HR: --  Sitting BP: 146/75 HR: 91  Standing BP: 157/91 HR: 95  Site: --  Mode: --    Lipid Panel: Date/Time: 09-30-24 @ 04:45  Cholesterol, Serum: 148  LDL Cholesterol Calculated: 64  HDL Cholesterol, Serum: 56  Total Cholesterol/HDL Ration Measurement: --  Triglycerides, Serum: 138

## 2024-12-13 NOTE — BH INPATIENT PSYCHIATRY PROGRESS NOTE - NSBHFUPINTERVALHXFT_PSY_A_CORE
Patient seen for schizoaffective disorder. Chart reviewed and discussed with nursing staff. Pt is only partially compliant with medications or takes much coaxing. Eating fair. Continues to have periods of lability

## 2024-12-13 NOTE — BH INPATIENT PSYCHIATRY PROGRESS NOTE - NSBHCHARTREVIEWVS_PSY_A_CORE FT
Vital Signs Last 24 Hrs  T(C): --  T(F): --  HR: --  BP: --  BP(mean): --  RR: --  SpO2: --    Orthostatic VS  12-12-24 @ 07:51  Lying BP: --/-- HR: --  Sitting BP: 146/75 HR: 91  Standing BP: 157/91 HR: 95  Site: --  Mode: --

## 2024-12-14 LAB
GLUCOSE BLDC GLUCOMTR-MCNC: 182 MG/DL — HIGH (ref 70–99)
GLUCOSE BLDC GLUCOMTR-MCNC: 194 MG/DL — HIGH (ref 70–99)

## 2024-12-14 RX ADMIN — QUETIAPINE FUMARATE 200 MILLIGRAM(S): 300 TABLET ORAL at 19:58

## 2024-12-14 RX ADMIN — Medication 1000 MILLIGRAM(S): at 19:59

## 2024-12-14 RX ADMIN — Medication 1 TABLET(S): at 19:58

## 2024-12-14 RX ADMIN — ACETAMINOPHEN, DIPHENHYDRAMINE HCL, PHENYLEPHRINE HCL 3 MILLIGRAM(S): 325; 25; 5 TABLET ORAL at 19:58

## 2024-12-15 RX ADMIN — Medication 1 TABLET(S): at 23:28

## 2024-12-15 RX ADMIN — QUETIAPINE FUMARATE 200 MILLIGRAM(S): 300 TABLET ORAL at 23:28

## 2024-12-16 PROCEDURE — 99231 SBSQ HOSP IP/OBS SF/LOW 25: CPT

## 2024-12-16 NOTE — BH INPATIENT PSYCHIATRY PROGRESS NOTE - NSBHMETABOLIC_PSY_ALL_CORE_FT
BMI: BMI (kg/m2): 29.7 (10-21-24 @ 16:58)  HbA1c: A1C with Estimated Average Glucose Result: 7.3 % (09-29-24 @ 17:15)    Glucose: POCT Blood Glucose.: 182 mg/dL (12-14-24 @ 20:42)    BP: --Vital Signs Last 24 Hrs  T(C): --  T(F): --  HR: --  BP: --  BP(mean): --  RR: --  SpO2: --      Lipid Panel: Date/Time: 09-30-24 @ 04:45  Cholesterol, Serum: 148  LDL Cholesterol Calculated: 64  HDL Cholesterol, Serum: 56  Total Cholesterol/HDL Ration Measurement: --  Triglycerides, Serum: 138
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
## 2024-12-16 NOTE — BH INPATIENT PSYCHIATRY PROGRESS NOTE - NSBHFUPINTERVALHXFT_PSY_A_CORE
Patient seen for schizoaffective disorder. Chart reviewed and discussed with nursing staff. Pt is only partially compliant with medications or takes much coaxing. Eating fair. Continues to have periods of lability. Refused vitals

## 2024-12-17 PROCEDURE — 99232 SBSQ HOSP IP/OBS MODERATE 35: CPT

## 2024-12-17 RX ADMIN — ACETAMINOPHEN, DIPHENHYDRAMINE HCL, PHENYLEPHRINE HCL 3 MILLIGRAM(S): 325; 25; 5 TABLET ORAL at 21:57

## 2024-12-17 RX ADMIN — Medication 1 TABLET(S): at 21:57

## 2024-12-17 RX ADMIN — QUETIAPINE FUMARATE 200 MILLIGRAM(S): 300 TABLET ORAL at 21:57

## 2024-12-17 RX ADMIN — LEVOTHYROXINE SODIUM 75 MICROGRAM(S): 25 TABLET ORAL at 06:48

## 2024-12-17 RX ADMIN — Medication 1000 MILLIGRAM(S): at 21:57
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
## 2024-12-18 LAB
FLUAV AG NPH QL: SIGNIFICANT CHANGE UP
FLUBV AG NPH QL: SIGNIFICANT CHANGE UP
GLUCOSE BLDC GLUCOMTR-MCNC: 236 MG/DL — HIGH (ref 70–99)
GLUCOSE BLDC GLUCOMTR-MCNC: 369 MG/DL — HIGH (ref 70–99)
RSV RNA NPH QL NAA+NON-PROBE: SIGNIFICANT CHANGE UP
SARS-COV-2 RNA SPEC QL NAA+PROBE: SIGNIFICANT CHANGE UP

## 2024-12-18 PROCEDURE — 99232 SBSQ HOSP IP/OBS MODERATE 35: CPT

## 2024-12-18 RX ORDER — INSULIN LISPRO 100/ML
VIAL (ML) SUBCUTANEOUS AT BEDTIME
Refills: 0 | Status: DISCONTINUED | OUTPATIENT
Start: 2024-12-18 | End: 2024-12-19

## 2024-12-18 RX ORDER — INSULIN LISPRO 100/ML
VIAL (ML) SUBCUTANEOUS
Refills: 0 | Status: DISCONTINUED | OUTPATIENT
Start: 2024-12-18 | End: 2024-12-19

## 2024-12-18 RX ADMIN — LEVOTHYROXINE SODIUM 75 MICROGRAM(S): 25 TABLET ORAL at 06:28

## 2024-12-18 RX ADMIN — Medication 1 TABLET(S): at 21:40

## 2024-12-18 RX ADMIN — Medication 1000 MILLIGRAM(S): at 21:40

## 2024-12-18 NOTE — BH INPATIENT PSYCHIATRY PROGRESS NOTE - NSBHASSESSSUMMFT_PSY_ALL_CORE
Ms Azul is a 74 yo F w history of SCZ, Parkinson's dz, hypothyroidism, and multiple psych hospitalizations who presents for AMS. Pt transferred from [facility], where she was found to have fallen and was covered in urine. Pt has been hospitalized in [facility] with occasional stays at [facility] for medical concerns since the start of this year for psychosis. Per [facility] notes, pt appears to be awaiting transfer to [facility] hospital. Pt has been delusional at [facility] with recent delusions surrounding pregnancy. Current medications are Depakote 750 mg bid, Seroquel 50 mg bid, and Prolixin Dec 12.5 mg IM q2w (last dose 9/24).     In [facility] stroke code was negative for acute pathology. Pt refused MRI and neuro deemed it not necessary. EEG did not reveal seizure activity and toxic encephalopathy workup was also negative. Psychiatry continued Depakote 1000mg BID, Seroquel 75mg QHS and Prolixin Dec 12.5 mg IM q2w (last dose given on 10/9), next dose due 10/23/24. As per psych CL pt has been irritable, aggressive at times requiring PRN medications and restraints. Paranoid and disorganized.     On arrival to  pt continues to present paranoid delusions, mentions she was poisoned by nurses at [facility], continues to endorse delusions of pregnancy. Denies SI/HI. Requires inpatient level of care due to inability to care for herself or transition to outpatient level of care given severe episodes of agitation, aggression and disorganized behavior in the context of psychosis.        10/22 Clinical Update: Ms. Azul refused to have her vital signs taken this morning. No behavioral outbursts or aggression reported. Bloodwork reviewed and WNL. Valproic acid level within therapeutic range at 65.70. Pt is visible in the dayroom today. Seems sedated, dozing off during interview. May need Seroquel dose to be adjusted to minimize daytime sedation. Due for Prolixin dec tomorrow.    10/23: Refused VS, irritable on approach, will offer Prolixin Dec tomorrow if more agreeable.  Did take PO meds.    10/24: Episodic agitation, did require IM prn last night.  Refused SHAFER and declined AM meds.  10/25: Remains irritable, refusing meds and VS, declines SHAFER, will likely require MOO.  10/28 Patient psychotic tenuous will increase Seroquel request transfer hearing start TOO, change prn to Prolixin Benadryl as never has had much response to olanzapine  10/29 Somewhat calmer today possibly from prn and taking Seroquel last night but refused BP meds, and Depakote which may affect medical condition encourage compliance placed MOO paperwork  10/30 Selectively compliant with medications. Remains illogical, paranoid, uncooperative with care  10/31 Psychotic disorganized needing prns taking medical meds part of time. Cont meds plan TOO hearing  11/1 Calmer at moment but quite psychotic, compliance with medical and psych meds very erratic Cont meds and plan for TOO hearing  11/2: Noncompliant with medications, uncooperative with unit rules and ADL care.   11/3: Remains paranoid  11/4 Agitated tenuous poorly compliant will go to court for TOO, will move Seroquel to all PM at her request with hope this may improve compliance  11/5 Somewhat calmer today no prns, very psychotic, erratic compliance. Will offer decanoate today  she is due, plan court for TOO in AM.  11/6 Patient psychotic, not much change will give Prolixin  18.75mg today, Cont to titrate Seroquel but only hs at her request.   11/7 Patient irritable, aggressive delusional. Cont decanoate along with increasing Seroquel and using prns , await state transfer  11/8 Patient irritable more negativistic today do not feel though she has catatonic mutism as she is talking to other staff and peers other than writer. Cont to titrate Seroquel if she is taking regularly. If she refuses Depakote regularly may need to eval for transfer for IV anti convulsants.  11/9 guarded, easily irritable but more visible, no acute events overnight/this am, partially compliant with medical meds. No SI/HI reported.   11/12 guarded, uncooperative, disorganized and irritable, partially cooperative with meds, refuses vitals.   11/13 needed PRN IM and restraints for agitation, physical aggression, calmer later on. Remains delusional, disorganized and labile, partially compliant with medications.  11/14 no overnight events, no PRN needed, refused am meds, complied with hs seroquel. Remains guarded, uncooperative and dismissive, no suicidal/homicidal behavior.   11/15 no acute outbursts, no PRN needed. She does not think seroquel is helpful, only partially compliant with medications, no SI/HI. She spoke to  about abilify, to review her past medications trials.  11/18 Cont current meds including Shafer when due will re-eval Seroquel though other options limited will get ua and if patient will allow will add prn laxatives  11/19 Psychotic not much change level of agitation fluctuates while she regularly expresses paranoia and delusions of pregnancy. Cont current meds encourage compliant encourage allowing assist with ADL's. UA and C and S re-ordered  11/20 Not much change cont resistive to help with hygiene, delusional, irritable. Expressing willingness to try Abilify to [facility], will explore with patient  11/21 Patient calmer today possibly related to prn. Patient expressing willingness to try Abilify but based on past she rarely follows through. Cont current meds for now. U/A neg  no evidence UTI Incontinence likely mix of anatomical and behavioral factors  11/22: More subdued possibly some incremental benefit from regular dosing of decanoate. Will ocntinue will cont Seroquel rather than make changes when patient showing some gains, Will sl increase Seroquel as she appeared in past to benefit from 300mg/d  11/25 Modest gains overall with ongoing epsiodic agitation. Increase Seroquel give next dec of Prolixin at week 3. Ordered podiatry clinic.   11/26 Some gains with administrsation of dec next due 11/29. Plan further titration Seroquel  11/27 Modst gains. Next dec 11/29, plan further titration Seroquel  11/29 Remains with refractory illness. Cont meds, for dec today, plan further titration of Seroquel  12/2 Patient without much change, psychotic, irritable, tenuous at times. Cont treatment consider increasing Seroquel. Will ask PT to re-eval walker generally viewed as not appropriate as she may use it in unsafe way.  12/3 Patient calmer with SHAFER. Cont current meds consider increasing Seroquel. ordered dental consult. Will consider speech consult with hope advancing diet  12/4 Patient w/o much changed, scheduled for dental appointment. Not considered safe to give walker due to risk of aggression Will get labs in AM on routine   basis  12/5 Patient with some gains. Will increase Seroquel will have her seen in dental tomorrow. reviewed plan of care with sister  12/6 Patient improved modestly cont current meds plan to titrate Seroquel consider making Prolixin 25mg monthly to decrease number of injections can return to dental if agrees to extraction or any deterioration occurs  12/9 Patient partially improved cont current meds will increase dose of dec next shot to see if can give q4 weeks  12/10 Modest gains. Cont meds consider increasing Seroquel. Ordered cine to see if feasible to advance diet  12/11 Patient agitated and revealing new delusions. Plan cont to titrate Seroquel and Prolixin. will hold off on rescheduling cine to avoid inadvertently provoking her. Will lower melatonin as she blames this for sedating her leading t missed appointment  12/12 Paranoid, somatic cont current meds.  12/13 Labile, with periods of agitation. Continue current medications   12/16 Not much change cont current meds will increase fluphenazine next shot. Patient refusing to go for cine  12/17 No major changes patient refusing Depakote about half the time, if continues will discuss with neuro what options are available  12/18 Possibly more psychotic, will increase fluphenazine dec next dose.   Plan:  -[facility] 2S under 2PC legal status  -Routine checks  -Bed block  -Continue Depakote 1000mg BID  -Continue Seroquel 150 mg QHS  -Prolixin Dec 18.75 mg received on 11/6/24.  -Neuro workup at [facility] negative for seizure activity or acute stroke

## 2024-12-18 NOTE — BH CHART NOTE - NSEVENTNOTEFT_PSY_ALL_CORE
Reina Azul: SAUL paged at 17:10 for POCT glucose of 369. Per verbal nursing report, unit attending with contingency plan of reinstating sliding scale and holding seroquel for tonight. Orders placed. Medical PA to asha.  Reina Azul: SAUL paged at 17:10 for POCT glucose of 369. Per verbal nursing report, unit attending with contingency plan of reinstating sliding scale and holding seroquel for tonight. Refusing sliding scale insulin. Continue POC glucose monitoring. If presenting with symptoms of dehydration, confusion of changes in mental status, to call back SAUL. Can continue to hold seroquel for tonight.

## 2024-12-19 LAB
GLUCOSE BLDC GLUCOMTR-MCNC: 208 MG/DL — HIGH (ref 70–99)
GLUCOSE BLDC GLUCOMTR-MCNC: 342 MG/DL — HIGH (ref 70–99)

## 2024-12-19 PROCEDURE — 99232 SBSQ HOSP IP/OBS MODERATE 35: CPT

## 2024-12-19 RX ORDER — INSULIN LISPRO 100/ML
VIAL (ML) SUBCUTANEOUS
Refills: 0 | Status: DISCONTINUED | OUTPATIENT
Start: 2024-12-19 | End: 2024-12-19

## 2024-12-19 RX ORDER — INSULIN LISPRO 100/ML
VIAL (ML) SUBCUTANEOUS
Refills: 0 | Status: DISCONTINUED | OUTPATIENT
Start: 2024-12-19 | End: 2025-01-29

## 2024-12-19 RX ORDER — FLUPHENAZINE HCL 5 MG
25 TABLET ORAL ONCE
Refills: 0 | Status: COMPLETED | OUTPATIENT
Start: 2024-12-20 | End: 2024-12-20

## 2024-12-19 RX ORDER — INSULIN LISPRO 100/ML
VIAL (ML) SUBCUTANEOUS AT BEDTIME
Refills: 0 | Status: DISCONTINUED | OUTPATIENT
Start: 2024-12-19 | End: 2025-01-29

## 2024-12-19 RX ORDER — INSULIN LISPRO 100/ML
VIAL (ML) SUBCUTANEOUS AT BEDTIME
Refills: 0 | Status: DISCONTINUED | OUTPATIENT
Start: 2024-12-19 | End: 2024-12-19

## 2024-12-19 RX ADMIN — Medication 1000 MILLIGRAM(S): at 21:50

## 2024-12-19 RX ADMIN — QUETIAPINE FUMARATE 200 MILLIGRAM(S): 300 TABLET ORAL at 21:51

## 2024-12-19 RX ADMIN — Medication 2 TABLET(S): at 21:51

## 2024-12-19 RX ADMIN — Medication 1 TABLET(S): at 21:50

## 2024-12-19 NOTE — BH INPATIENT PSYCHIATRY PROGRESS NOTE - CURRENT MEDICATION
MEDICATIONS  (STANDING):  cholecalciferol 1000 Unit(s) Oral daily  divalproex Sprinkle 1000 milliGRAM(s) Oral two times a day  glucagon  Injectable 1 milliGRAM(s) IntraMuscular once  insulin lispro (ADMELOG) corrective regimen sliding scale   SubCutaneous at bedtime  insulin lispro (ADMELOG) corrective regimen sliding scale   SubCutaneous three times a day before meals  levothyroxine 75 MICROGram(s) Oral daily  melatonin. 3 milliGRAM(s) Oral at bedtime  metFORMIN 1000 milliGRAM(s) Oral two times a day with meals  metoprolol succinate ER 50 milliGRAM(s) Oral daily  multivitamin 1 Tablet(s) Oral at bedtime  QUEtiapine 200 milliGRAM(s) Oral at bedtime  senna 2 Tablet(s) Oral at bedtime    MEDICATIONS  (PRN):  acetaminophen     Tablet .. 650 milliGRAM(s) Oral every 6 hours PRN Mild Pain (1 - 3)  albuterol    90 MICROgram(s) HFA Inhaler 2 Puff(s) Inhalation every 6 hours PRN Bronchospasm  aluminum hydroxide/magnesium hydroxide/simethicone Suspension 30 milliLiter(s) Oral every 4 hours PRN Dyspepsia  bisacodyl 5 milliGRAM(s) Oral daily PRN Constipation  dextrose Oral Gel 15 Gram(s) Oral once PRN Blood Glucose LESS THAN 70 milliGRAM(s)/deciliter  dextrose Oral Gel 15 Gram(s) Oral once PRN hypoglycemia  diphenhydrAMINE Elixir 12.5 milliGRAM(s) Oral every 6 hours PRN eps prevention  diphenhydrAMINE Injectable 12.5 milliGRAM(s) IntraMuscular once PRN EPS prevention  fluPHENAZine 5 milliGRAM(s) Oral every 6 hours PRN agitation  fluPHENAZine  Injectable 5 milliGRAM(s) IntraMuscular once PRN agitation  polyethylene glycol 3350 17 Gram(s) Oral daily PRN constipation

## 2024-12-19 NOTE — BH INPATIENT PSYCHIATRY PROGRESS NOTE - NSBHASSESSSUMMFT_PSY_ALL_CORE
Ms Azul is a 72 yo F w history of SCZ, Parkinson's dz, hypothyroidism, and multiple psych hospitalizations who presents for AMS. Pt transferred from [facility], where she was found to have fallen and was covered in urine. Pt has been hospitalized in [facility] with occasional stays at [facility] for medical concerns since the start of this year for psychosis. Per [facility] notes, pt appears to be awaiting transfer to [facility] hospital. Pt has been delusional at [facility] with recent delusions surrounding pregnancy. Current medications are Depakote 750 mg bid, Seroquel 50 mg bid, and Prolixin Dec 12.5 mg IM q2w (last dose 9/24).     In [facility] stroke code was negative for acute pathology. Pt refused MRI and neuro deemed it not necessary. EEG did not reveal seizure activity and toxic encephalopathy workup was also negative. Psychiatry continued Depakote 1000mg BID, Seroquel 75mg QHS and Prolixin Dec 12.5 mg IM q2w (last dose given on 10/9), next dose due 10/23/24. As per psych CL pt has been irritable, aggressive at times requiring PRN medications and restraints. Paranoid and disorganized.     On arrival to  pt continues to present paranoid delusions, mentions she was poisoned by nurses at [facility], continues to endorse delusions of pregnancy. Denies SI/HI. Requires inpatient level of care due to inability to care for herself or transition to outpatient level of care given severe episodes of agitation, aggression and disorganized behavior in the context of psychosis.        10/22 Clinical Update: Ms. Azul refused to have her vital signs taken this morning. No behavioral outbursts or aggression reported. Bloodwork reviewed and WNL. Valproic acid level within therapeutic range at 65.70. Pt is visible in the dayroom today. Seems sedated, dozing off during interview. May need Seroquel dose to be adjusted to minimize daytime sedation. Due for Prolixin dec tomorrow.    10/23: Refused VS, irritable on approach, will offer Prolixin Dec tomorrow if more agreeable.  Did take PO meds.    10/24: Episodic agitation, did require IM prn last night.  Refused SHAFER and declined AM meds.  10/25: Remains irritable, refusing meds and VS, declines SHAFER, will likely require MOO.  10/28 Patient psychotic tenuous will increase Seroquel request transfer hearing start TOO, change prn to Prolixin Benadryl as never has had much response to olanzapine  10/29 Somewhat calmer today possibly from prn and taking Seroquel last night but refused BP meds, and Depakote which may affect medical condition encourage compliance placed MOO paperwork  10/30 Selectively compliant with medications. Remains illogical, paranoid, uncooperative with care  10/31 Psychotic disorganized needing prns taking medical meds part of time. Cont meds plan TOO hearing  11/1 Calmer at moment but quite psychotic, compliance with medical and psych meds very erratic Cont meds and plan for TOO hearing  11/2: Noncompliant with medications, uncooperative with unit rules and ADL care.   11/3: Remains paranoid  11/4 Agitated tenuous poorly compliant will go to court for TOO, will move Seroquel to all PM at her request with hope this may improve compliance  11/5 Somewhat calmer today no prns, very psychotic, erratic compliance. Will offer decanoate today  she is due, plan court for TOO in AM.  11/6 Patient psychotic, not much change will give Prolixin  18.75mg today, Cont to titrate Seroquel but only hs at her request.   11/7 Patient irritable, aggressive delusional. Cont decanoate along with increasing Seroquel and using prns , await state transfer  11/8 Patient irritable more negativistic today do not feel though she has catatonic mutism as she is talking to other staff and peers other than writer. Cont to titrate Seroquel if she is taking regularly. If she refuses Depakote regularly may need to eval for transfer for IV anti convulsants.  11/9 guarded, easily irritable but more visible, no acute events overnight/this am, partially compliant with medical meds. No SI/HI reported.   11/12 guarded, uncooperative, disorganized and irritable, partially cooperative with meds, refuses vitals.   11/13 needed PRN IM and restraints for agitation, physical aggression, calmer later on. Remains delusional, disorganized and labile, partially compliant with medications.  11/14 no overnight events, no PRN needed, refused am meds, complied with hs seroquel. Remains guarded, uncooperative and dismissive, no suicidal/homicidal behavior.   11/15 no acute outbursts, no PRN needed. She does not think seroquel is helpful, only partially compliant with medications, no SI/HI. She spoke to  about abilify, to review her past medications trials.  11/18 Cont current meds including Shafer when due will re-eval Seroquel though other options limited will get ua and if patient will allow will add prn laxatives  11/19 Psychotic not much change level of agitation fluctuates while she regularly expresses paranoia and delusions of pregnancy. Cont current meds encourage compliant encourage allowing assist with ADL's. UA and C and S re-ordered  11/20 Not much change cont resistive to help with hygiene, delusional, irritable. Expressing willingness to try Abilify to [facility], will explore with patient  11/21 Patient calmer today possibly related to prn. Patient expressing willingness to try Abilify but based on past she rarely follows through. Cont current meds for now. U/A neg  no evidence UTI Incontinence likely mix of anatomical and behavioral factors  11/22: More subdued possibly some incremental benefit from regular dosing of decanoate. Will ocntinue will cont Seroquel rather than make changes when patient showing some gains, Will sl increase Seroquel as she appeared in past to benefit from 300mg/d  11/25 Modest gains overall with ongoing epsiodic agitation. Increase Seroquel give next dec of Prolixin at week 3. Ordered podiatry clinic.   11/26 Some gains with administrsation of dec next due 11/29. Plan further titration Seroquel  11/27 Modst gains. Next dec 11/29, plan further titration Seroquel  11/29 Remains with refractory illness. Cont meds, for dec today, plan further titration of Seroquel  12/2 Patient without much change, psychotic, irritable, tenuous at times. Cont treatment consider increasing Seroquel. Will ask PT to re-eval walker generally viewed as not appropriate as she may use it in unsafe way.  12/3 Patient calmer with SHAFER. Cont current meds consider increasing Seroquel. ordered dental consult. Will consider speech consult with hope advancing diet  12/4 Patient w/o much changed, scheduled for dental appointment. Not considered safe to give walker due to risk of aggression Will get labs in AM on routine   basis  12/5 Patient with some gains. Will increase Seroquel will have her seen in dental tomorrow. reviewed plan of care with sister  12/6 Patient improved modestly cont current meds plan to titrate Seroquel consider making Prolixin 25mg monthly to decrease number of injections can return to dental if agrees to extraction or any deterioration occurs  12/9 Patient partially improved cont current meds will increase dose of dec next shot to see if can give q4 weeks  12/10 Modest gains. Cont meds consider increasing Seroquel. Ordered cine to see if feasible to advance diet  12/11 Patient agitated and revealing new delusions. Plan cont to titrate Seroquel and Prolixin. will hold off on rescheduling cine to avoid inadvertently provoking her. Will lower melatonin as she blames this for sedating her leading t missed appointment  12/12 Paranoid, somatic cont current meds.  12/13 Labile, with periods of agitation. Continue current medications   12/16 Not much change cont current meds will increase fluphenazine next shot. Patient refusing to go for cine  12/17 No major changes patient refusing Depakote about half the time, if continues will discuss with neuro what options are available  12/18 Possibly more psychotic, will increase fluphenazine dec next dose.   12/19 More delusional plan increase next dec dose. Will not try at this time givwe acute IM as per court order as leads to increased agitation more than clear benefit. MAy need to return to court for order to give Insulin per court order  Plan:  -[facility] 2S under 2PC legal status  -Routine checks  -Bed block  -Continue Depakote 1000mg BID  -Continue Seroquel 150 mg QHS  -Prolixin Dec 18.75 mg received on 11/6/24.  -Neuro workup at [facility] negative for seizure activity or acute stroke

## 2024-12-19 NOTE — BH INPATIENT PSYCHIATRY PROGRESS NOTE - NSBHFUPINTERVALHXFT_PSY_A_CORE
Patient seen for schizoaffective disorder. Chart reviewed and discussed with nursing staff. Pt is only partially compliant with medications or takes much coaxing. Eating fair. Continues to have periods of lability. Refused vitals, refusing metformin consistently, fs elevated  last night was in mid 300 better today

## 2024-12-19 NOTE — BH INPATIENT PSYCHIATRY PROGRESS NOTE - NSBHMETABOLIC_PSY_ALL_CORE_FT
BMI: BMI (kg/m2): 29.7 (10-21-24 @ 16:58)  HbA1c: A1C with Estimated Average Glucose Result: 7.3 % (09-29-24 @ 17:15)    Glucose: POCT Blood Glucose.: 208 mg/dL (12-19-24 @ 07:52)    BP: --Vital Signs Last 24 Hrs  T(C): --  T(F): --  HR: --  BP: --  BP(mean): --  RR: --  SpO2: --      Lipid Panel: Date/Time: 09-30-24 @ 04:45  Cholesterol, Serum: 148  LDL Cholesterol Calculated: 64  HDL Cholesterol, Serum: 56  Total Cholesterol/HDL Ration Measurement: --  Triglycerides, Serum: 138

## 2024-12-20 LAB — GLUCOSE BLDC GLUCOMTR-MCNC: 281 MG/DL — HIGH (ref 70–99)

## 2024-12-20 PROCEDURE — 99232 SBSQ HOSP IP/OBS MODERATE 35: CPT

## 2024-12-20 RX ORDER — LINAGLIPTIN 5 MG/1
5 TABLET, FILM COATED ORAL DAILY
Refills: 0 | Status: DISCONTINUED | OUTPATIENT
Start: 2024-12-20 | End: 2024-12-23

## 2024-12-20 RX ORDER — LINAGLIPTIN 5 MG/1
5 TABLET, FILM COATED ORAL DAILY
Refills: 0 | Status: DISCONTINUED | OUTPATIENT
Start: 2024-12-20 | End: 2024-12-20

## 2024-12-20 RX ADMIN — LEVOTHYROXINE SODIUM 75 MICROGRAM(S): 25 TABLET ORAL at 06:45

## 2024-12-20 RX ADMIN — Medication 25 MILLIGRAM(S): at 10:25

## 2024-12-20 RX ADMIN — QUETIAPINE FUMARATE 200 MILLIGRAM(S): 300 TABLET ORAL at 21:47

## 2024-12-20 RX ADMIN — Medication 1 TABLET(S): at 21:47

## 2024-12-20 RX ADMIN — LINAGLIPTIN 5 MILLIGRAM(S): 5 TABLET, FILM COATED ORAL at 16:38

## 2024-12-20 RX ADMIN — Medication 1000 MILLIGRAM(S): at 21:47

## 2024-12-20 RX ADMIN — ACETAMINOPHEN, DIPHENHYDRAMINE HCL, PHENYLEPHRINE HCL 3 MILLIGRAM(S): 325; 25; 5 TABLET ORAL at 21:47

## 2024-12-20 NOTE — BH INPATIENT PSYCHIATRY PROGRESS NOTE - NSBHMETABOLIC_PSY_ALL_CORE_FT
BMI: BMI (kg/m2): 29.7 (10-21-24 @ 16:58)  HbA1c: A1C with Estimated Average Glucose Result: 7.3 % (09-29-24 @ 17:15)    Glucose: POCT Blood Glucose.: 342 mg/dL (12-19-24 @ 20:40)    BP: --Vital Signs Last 24 Hrs  T(C): --  T(F): --  HR: --  BP: --  BP(mean): --  RR: --  SpO2: --      Lipid Panel: Date/Time: 09-30-24 @ 04:45  Cholesterol, Serum: 148  LDL Cholesterol Calculated: 64  HDL Cholesterol, Serum: 56  Total Cholesterol/HDL Ration Measurement: --  Triglycerides, Serum: 138

## 2024-12-20 NOTE — PROGRESS NOTE ADULT - ASSESSMENT
74yo F with PMH of vascular dementia, Parkinson’s disease, retinopathy with macular edema, asthma, seizure disorder, hypothyroidism, GERD, HLD, MDD, anxiety, MI, malignant neoplasm of the bladder, hydrocephalus, TERRY, schizophrenia, and HTN who was transferred to [facility] 9/29/24 for suspected seizure, transferred back to [facility] 10/21/24    #T2DM  - HbA1c 7.9% 8/27/24 when taking metformin consistently, has now devloped delusion and refusing.  Attempt to offer linagliptin in its place.  Monitor FBSG, if >400 should be sent to ED for insulin administration over objection and labs to ensure no DKA is developing.  Await court order for insulin administration over objection; currently patient has the right to refuse while at [facility] unless a medical emergency.  - CC diet    #Seizure d/o  - c/w divalproex 1000mg po BID, dose increase at [facility]    #HTN  - c/w metoprolol  - avoid agents that may trigger angioedema (ACEI)    #Schizophrenia  - plan and meds per psych    #Parkison’s disease  - c/w carbidopa/levodopa    #Hypothyroidism  - c/w levothyroxine 75mcg po qam

## 2024-12-20 NOTE — BH INPATIENT PSYCHIATRY PROGRESS NOTE - NSBHFUPINTERVALHXFT_PSY_A_CORE
Patient seen for schizoaffective disorder. Chart reviewed and discussed with nursing staff. Pt is only partially compliant with medications or takes much coaxing. Eating fair. Continues to have periods of lability. Refused vitals, refusing metformin consistently, fs elevated  refused fs today. Had Maher very angry right after then calmed down.

## 2024-12-20 NOTE — BH INPATIENT PSYCHIATRY PROGRESS NOTE - CURRENT MEDICATION
MEDICATIONS  (STANDING):  cholecalciferol 1000 Unit(s) Oral daily  divalproex Sprinkle 1000 milliGRAM(s) Oral two times a day  glucagon  Injectable 1 milliGRAM(s) IntraMuscular once  insulin lispro (ADMELOG) corrective regimen sliding scale   SubCutaneous three times a day before meals  insulin lispro (ADMELOG) corrective regimen sliding scale   SubCutaneous at bedtime  levothyroxine 75 MICROGram(s) Oral daily  linagliptin 5 milliGRAM(s) Oral daily  melatonin. 3 milliGRAM(s) Oral at bedtime  metoprolol succinate ER 50 milliGRAM(s) Oral daily  multivitamin 1 Tablet(s) Oral at bedtime  QUEtiapine 200 milliGRAM(s) Oral at bedtime  senna 2 Tablet(s) Oral at bedtime    MEDICATIONS  (PRN):  acetaminophen     Tablet .. 650 milliGRAM(s) Oral every 6 hours PRN Mild Pain (1 - 3)  albuterol    90 MICROgram(s) HFA Inhaler 2 Puff(s) Inhalation every 6 hours PRN Bronchospasm  aluminum hydroxide/magnesium hydroxide/simethicone Suspension 30 milliLiter(s) Oral every 4 hours PRN Dyspepsia  bisacodyl 5 milliGRAM(s) Oral daily PRN Constipation  dextrose Oral Gel 15 Gram(s) Oral once PRN hypoglycemia  dextrose Oral Gel 15 Gram(s) Oral once PRN Blood Glucose LESS THAN 70 milliGRAM(s)/deciliter  diphenhydrAMINE Elixir 12.5 milliGRAM(s) Oral every 6 hours PRN eps prevention  diphenhydrAMINE Injectable 12.5 milliGRAM(s) IntraMuscular once PRN EPS prevention  fluPHENAZine 5 milliGRAM(s) Oral every 6 hours PRN agitation  fluPHENAZine  Injectable 5 milliGRAM(s) IntraMuscular once PRN agitation  polyethylene glycol 3350 17 Gram(s) Oral daily PRN constipation
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
## 2024-12-21 RX ADMIN — QUETIAPINE FUMARATE 200 MILLIGRAM(S): 300 TABLET ORAL at 19:58

## 2024-12-21 RX ADMIN — Medication 1000 MILLIGRAM(S): at 19:58

## 2024-12-21 RX ADMIN — Medication 1 TABLET(S): at 20:00

## 2024-12-22 LAB — GLUCOSE BLDC GLUCOMTR-MCNC: 307 MG/DL — HIGH (ref 70–99)

## 2024-12-22 RX ADMIN — QUETIAPINE FUMARATE 200 MILLIGRAM(S): 300 TABLET ORAL at 21:54

## 2024-12-22 RX ADMIN — LEVOTHYROXINE SODIUM 75 MICROGRAM(S): 25 TABLET ORAL at 05:58

## 2024-12-22 RX ADMIN — Medication 1 TABLET(S): at 21:54

## 2024-12-22 RX ADMIN — Medication 1000 MILLIGRAM(S): at 21:54

## 2024-12-22 NOTE — PHYSICAL THERAPY INITIAL EVALUATION ADULT - WEIGHT-BEARING RESTRICTIONS: GAIT, REHAB EVAL
Pt arrives from home with complaints of weakness/general fatigue, and being in AFIB RVR. Pt was recently diagnosed with AFIB,and had Pneumonia. She still has a cough that will not go away, and also has chest pain since last night. .      Triage Assessment (Adult)       Row Name 12/22/24 1252          Triage Assessment    Airway WDL WDL        Respiratory WDL    Respiratory WDL WDL        Skin Circulation/Temperature WDL    Skin Circulation/Temperature WDL WDL        Cardiac WDL    Cardiac WDL X;chest pain;rhythm     Cardiac Rhythm Atrial fibrillation        Chest Pain Assessment    Chest Pain Location midsternal     Character other (see comments)  heavy        Peripheral/Neurovascular WDL    Peripheral Neurovascular WDL WDL        Cognitive/Neuro/Behavioral WDL    Cognitive/Neuro/Behavioral WDL WDL                      full weight-bearing

## 2024-12-23 LAB — GLUCOSE BLDC GLUCOMTR-MCNC: 285 MG/DL — HIGH (ref 70–99)

## 2024-12-23 PROCEDURE — 99232 SBSQ HOSP IP/OBS MODERATE 35: CPT

## 2024-12-23 RX ORDER — METFORMIN HYDROCHLORIDE 1000 MG/1
1000 TABLET, COATED ORAL
Refills: 0 | Status: DISCONTINUED | OUTPATIENT
Start: 2024-12-23 | End: 2025-01-29

## 2024-12-23 RX ADMIN — Medication 2 TABLET(S): at 22:12

## 2024-12-23 RX ADMIN — QUETIAPINE FUMARATE 200 MILLIGRAM(S): 300 TABLET ORAL at 22:12

## 2024-12-23 RX ADMIN — Medication 1 TABLET(S): at 22:12

## 2024-12-23 RX ADMIN — Medication 1000 MILLIGRAM(S): at 22:12

## 2024-12-23 RX ADMIN — METFORMIN HYDROCHLORIDE 1000 MILLIGRAM(S): 1000 TABLET, COATED ORAL at 16:44

## 2024-12-23 NOTE — BH INPATIENT PSYCHIATRY PROGRESS NOTE - NSBHFUPINTERVALHXFT_PSY_A_CORE
Patient seen for schizoaffective disorder. Chart reviewed and discussed with nursing staff. Pt is only partially compliant with medications or takes much coaxing. Eating fair. Continues to have periods of lability. VSS, refused Trajenta 2/3 days . No major outbursts or aggression needing prn

## 2024-12-23 NOTE — ADVANCED PRACTICE NURSE CONSULT - RECOMMEDATIONS
Endorsed to primary team to order Metformin.  Continue reinforce the importance the take medication as prescribe.

## 2024-12-23 NOTE — BH INPATIENT PSYCHIATRY PROGRESS NOTE - NSBHCHARTREVIEWVS_PSY_A_CORE FT
Vital Signs Last 24 Hrs  T(C): 36.3 (12-23-24 @ 08:40), Max: 36.3 (12-23-24 @ 08:40)  T(F): 97.4 (12-23-24 @ 08:40), Max: 97.4 (12-23-24 @ 08:40)  HR: 87 (12-23-24 @ 08:40) (87 - 87)  BP: 158/90 (12-23-24 @ 08:40) (158/90 - 158/90)  BP(mean): --  RR: --  SpO2: --

## 2024-12-23 NOTE — BH INPATIENT PSYCHIATRY PROGRESS NOTE - NSBHMETABOLIC_PSY_ALL_CORE_FT
BMI: BMI (kg/m2): 29.7 (10-21-24 @ 16:58)  HbA1c: A1C with Estimated Average Glucose Result: 7.3 % (09-29-24 @ 17:15)    Glucose: POCT Blood Glucose.: 285 mg/dL (12-23-24 @ 12:09)    BP: 158/90 (12-23-24 @ 08:40) (158/90 - 158/90)Vital Signs Last 24 Hrs  T(C): 36.3 (12-23-24 @ 08:40), Max: 36.3 (12-23-24 @ 08:40)  T(F): 97.4 (12-23-24 @ 08:40), Max: 97.4 (12-23-24 @ 08:40)  HR: 87 (12-23-24 @ 08:40) (87 - 87)  BP: 158/90 (12-23-24 @ 08:40) (158/90 - 158/90)  BP(mean): --  RR: --  SpO2: --      Lipid Panel: Date/Time: 09-30-24 @ 04:45  Cholesterol, Serum: 148  LDL Cholesterol Calculated: 64  HDL Cholesterol, Serum: 56  Total Cholesterol/HDL Ration Measurement: --  Triglycerides, Serum: 138
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
## 2024-12-24 LAB
GLUCOSE BLDC GLUCOMTR-MCNC: 299 MG/DL — HIGH (ref 70–99)
GLUCOSE BLDC GLUCOMTR-MCNC: 335 MG/DL — HIGH (ref 70–99)

## 2024-12-24 PROCEDURE — 99232 SBSQ HOSP IP/OBS MODERATE 35: CPT

## 2024-12-24 RX ADMIN — Medication 1000 MILLIGRAM(S): at 20:52

## 2024-12-24 RX ADMIN — METFORMIN HYDROCHLORIDE 1000 MILLIGRAM(S): 1000 TABLET, COATED ORAL at 08:43

## 2024-12-24 RX ADMIN — METFORMIN HYDROCHLORIDE 1000 MILLIGRAM(S): 1000 TABLET, COATED ORAL at 16:49

## 2024-12-24 RX ADMIN — Medication 2 TABLET(S): at 20:52

## 2024-12-24 RX ADMIN — Medication 1 TABLET(S): at 20:52

## 2024-12-24 NOTE — BH INPATIENT PSYCHIATRY PROGRESS NOTE - CURRENT MEDICATION
MEDICATIONS  (STANDING):  cholecalciferol 1000 Unit(s) Oral daily  divalproex Sprinkle 1000 milliGRAM(s) Oral two times a day  glucagon  Injectable 1 milliGRAM(s) IntraMuscular once  insulin lispro (ADMELOG) corrective regimen sliding scale   SubCutaneous at bedtime  insulin lispro (ADMELOG) corrective regimen sliding scale   SubCutaneous three times a day before meals  levothyroxine 75 MICROGram(s) Oral daily  melatonin. 3 milliGRAM(s) Oral at bedtime  metFORMIN 1000 milliGRAM(s) Oral two times a day with meals  metoprolol succinate ER 50 milliGRAM(s) Oral daily  multivitamin 1 Tablet(s) Oral at bedtime  QUEtiapine 200 milliGRAM(s) Oral at bedtime  senna 2 Tablet(s) Oral at bedtime    MEDICATIONS  (PRN):  acetaminophen     Tablet .. 650 milliGRAM(s) Oral every 6 hours PRN Mild Pain (1 - 3)  albuterol    90 MICROgram(s) HFA Inhaler 2 Puff(s) Inhalation every 6 hours PRN Bronchospasm  aluminum hydroxide/magnesium hydroxide/simethicone Suspension 30 milliLiter(s) Oral every 4 hours PRN Dyspepsia  bisacodyl 5 milliGRAM(s) Oral daily PRN Constipation  dextrose Oral Gel 15 Gram(s) Oral once PRN hypoglycemia  dextrose Oral Gel 15 Gram(s) Oral once PRN Blood Glucose LESS THAN 70 milliGRAM(s)/deciliter  diphenhydrAMINE Elixir 12.5 milliGRAM(s) Oral every 6 hours PRN eps prevention  diphenhydrAMINE Injectable 12.5 milliGRAM(s) IntraMuscular once PRN EPS prevention  fluPHENAZine 5 milliGRAM(s) Oral every 6 hours PRN agitation  fluPHENAZine  Injectable 5 milliGRAM(s) IntraMuscular once PRN agitation  polyethylene glycol 3350 17 Gram(s) Oral daily PRN constipation

## 2024-12-24 NOTE — BH INPATIENT PSYCHIATRY PROGRESS NOTE - NSBHMETABOLIC_PSY_ALL_CORE_FT
BMI: BMI (kg/m2): 29.7 (10-21-24 @ 16:58)  HbA1c: A1C with Estimated Average Glucose Result: 7.3 % (09-29-24 @ 17:15)    Glucose: POCT Blood Glucose.: 285 mg/dL (12-23-24 @ 12:09)    BP: 158/90 (12-23-24 @ 08:40) (158/90 - 158/90)Vital Signs Last 24 Hrs  T(C): --  T(F): --  HR: --  BP: --  BP(mean): --  RR: --  SpO2: --      Lipid Panel: Date/Time: 09-30-24 @ 04:45  Cholesterol, Serum: 148  LDL Cholesterol Calculated: 64  HDL Cholesterol, Serum: 56  Total Cholesterol/HDL Ration Measurement: --  Triglycerides, Serum: 138

## 2024-12-24 NOTE — BH INPATIENT PSYCHIATRY PROGRESS NOTE - NSBHFUPINTERVALHXFT_PSY_A_CORE
Patient seen for schizoaffective disorder. Chart reviewed and discussed with nursing staff. Pt is only partially compliant with medications or takes much coaxing. Eating fair. Continues to have periods of lability. VSS, accepted metformin last night and today. No major outbursts or aggression needing prn
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
## 2024-12-25 PROCEDURE — 99231 SBSQ HOSP IP/OBS SF/LOW 25: CPT

## 2024-12-25 RX ADMIN — Medication 1000 UNIT(S): at 10:32

## 2024-12-25 RX ADMIN — METFORMIN HYDROCHLORIDE 1000 MILLIGRAM(S): 1000 TABLET, COATED ORAL at 10:32

## 2024-12-25 RX ADMIN — Medication 2 TABLET(S): at 21:10

## 2024-12-25 RX ADMIN — Medication 1 TABLET(S): at 21:10

## 2024-12-25 RX ADMIN — Medication 1000 MILLIGRAM(S): at 10:32

## 2024-12-25 RX ADMIN — ACETAMINOPHEN, DIPHENHYDRAMINE HCL, PHENYLEPHRINE HCL 3 MILLIGRAM(S): 325; 25; 5 TABLET ORAL at 21:10

## 2024-12-25 RX ADMIN — LEVOTHYROXINE SODIUM 75 MICROGRAM(S): 25 TABLET ORAL at 07:04

## 2024-12-25 RX ADMIN — Medication 1000 MILLIGRAM(S): at 21:10

## 2024-12-25 RX ADMIN — QUETIAPINE FUMARATE 200 MILLIGRAM(S): 300 TABLET ORAL at 21:10

## 2024-12-25 NOTE — BH INPATIENT PSYCHIATRY PROGRESS NOTE - NSBHCHARTREVIEWVS_PSY_A_CORE FT
Vital Signs Last 24 Hrs  T(C): 36.4 (12-25-24 @ 07:56), Max: 36.4 (12-25-24 @ 07:56)  T(F): 97.5 (12-25-24 @ 07:56), Max: 97.5 (12-25-24 @ 07:56)  HR: --  BP: --  BP(mean): --  RR: --  SpO2: --    Orthostatic VS  12-25-24 @ 07:56  Lying BP: --/-- HR: --  Sitting BP: 137/83 HR: 88  Standing BP: --/-- HR: --  Site: --  Mode: --

## 2024-12-25 NOTE — BH INPATIENT PSYCHIATRY PROGRESS NOTE - NSBHMETABOLIC_PSY_ALL_CORE_FT
BMI: BMI (kg/m2): 29.7 (10-21-24 @ 16:58)  HbA1c: A1C with Estimated Average Glucose Result: 7.3 % (09-29-24 @ 17:15)    Glucose: POCT Blood Glucose.: 335 mg/dL (12-24-24 @ 20:39)    BP: 158/90 (12-23-24 @ 08:40) (158/90 - 158/90)Vital Signs Last 24 Hrs  T(C): 36.4 (12-25-24 @ 07:56), Max: 36.4 (12-25-24 @ 07:56)  T(F): 97.5 (12-25-24 @ 07:56), Max: 97.5 (12-25-24 @ 07:56)  HR: --  BP: --  BP(mean): --  RR: --  SpO2: --    Orthostatic VS  12-25-24 @ 07:56  Lying BP: --/-- HR: --  Sitting BP: 137/83 HR: 88  Standing BP: --/-- HR: --  Site: --  Mode: --    Lipid Panel: Date/Time: 09-30-24 @ 04:45  Cholesterol, Serum: 148  LDL Cholesterol Calculated: 64  HDL Cholesterol, Serum: 56  Total Cholesterol/HDL Ration Measurement: --  Triglycerides, Serum: 138
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
## 2024-12-26 LAB — GLUCOSE BLDC GLUCOMTR-MCNC: 324 MG/DL — HIGH (ref 70–99)

## 2024-12-26 PROCEDURE — 99232 SBSQ HOSP IP/OBS MODERATE 35: CPT

## 2024-12-26 RX ORDER — MECOBAL/LEVOMEFOLAT CA/B6 PHOS 2-3-35 MG
2 TABLET ORAL AT BEDTIME
Refills: 0 | Status: DISCONTINUED | OUTPATIENT
Start: 2024-12-26 | End: 2025-01-29

## 2024-12-26 RX ADMIN — Medication 2 TABLET(S): at 22:32

## 2024-12-26 RX ADMIN — Medication 1000 MILLIGRAM(S): at 22:32

## 2024-12-26 RX ADMIN — LEVOTHYROXINE SODIUM 75 MICROGRAM(S): 25 TABLET ORAL at 06:57

## 2024-12-26 RX ADMIN — ACETAMINOPHEN, DIPHENHYDRAMINE HCL, PHENYLEPHRINE HCL 3 MILLIGRAM(S): 325; 25; 5 TABLET ORAL at 22:32

## 2024-12-26 RX ADMIN — QUETIAPINE FUMARATE 200 MILLIGRAM(S): 300 TABLET ORAL at 22:32
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
## 2024-12-26 NOTE — BH INPATIENT PSYCHIATRY PROGRESS NOTE - NSBHMETABOLIC_PSY_ALL_CORE_FT
BMI: BMI (kg/m2): 29.7 (10-21-24 @ 16:58)  HbA1c: A1C with Estimated Average Glucose Result: 7.3 % (09-29-24 @ 17:15)    Glucose: POCT Blood Glucose.: 335 mg/dL (12-24-24 @ 20:39)    BP: --Vital Signs Last 24 Hrs  T(C): --  T(F): --  HR: --  BP: --  BP(mean): --  RR: --  SpO2: --    Orthostatic VS  12-25-24 @ 07:56  Lying BP: --/-- HR: --  Sitting BP: 137/83 HR: 88  Standing BP: --/-- HR: --  Site: --  Mode: --    Lipid Panel: Date/Time: 09-30-24 @ 04:45  Cholesterol, Serum: 148  LDL Cholesterol Calculated: 64  HDL Cholesterol, Serum: 56  Total Cholesterol/HDL Ration Measurement: --  Triglycerides, Serum: 138

## 2024-12-26 NOTE — BH INPATIENT PSYCHIATRY PROGRESS NOTE - NSBHCHARTREVIEWVS_PSY_A_CORE FT
Vital Signs Last 24 Hrs  T(C): --  T(F): --  HR: --  BP: --  BP(mean): --  RR: --  SpO2: --    Orthostatic VS  12-25-24 @ 07:56  Lying BP: --/-- HR: --  Sitting BP: 137/83 HR: 88  Standing BP: --/-- HR: --  Site: --  Mode: --

## 2024-12-26 NOTE — BH INPATIENT PSYCHIATRY PROGRESS NOTE - CURRENT MEDICATION
MEDICATIONS  (STANDING):  cholecalciferol 1000 Unit(s) Oral daily  divalproex Sprinkle 1000 milliGRAM(s) Oral two times a day  glucagon  Injectable 1 milliGRAM(s) IntraMuscular once  insulin lispro (ADMELOG) corrective regimen sliding scale   SubCutaneous at bedtime  insulin lispro (ADMELOG) corrective regimen sliding scale   SubCutaneous three times a day before meals  levothyroxine 75 MICROGram(s) Oral daily  melatonin. 3 milliGRAM(s) Oral at bedtime  metFORMIN 1000 milliGRAM(s) Oral two times a day with meals  metoprolol succinate ER 50 milliGRAM(s) Oral daily  multivitamin 2 Tablet(s) Oral at bedtime  QUEtiapine 200 milliGRAM(s) Oral at bedtime  senna 2 Tablet(s) Oral at bedtime    MEDICATIONS  (PRN):  acetaminophen     Tablet .. 650 milliGRAM(s) Oral every 6 hours PRN Mild Pain (1 - 3)  albuterol    90 MICROgram(s) HFA Inhaler 2 Puff(s) Inhalation every 6 hours PRN Bronchospasm  aluminum hydroxide/magnesium hydroxide/simethicone Suspension 30 milliLiter(s) Oral every 4 hours PRN Dyspepsia  bisacodyl 5 milliGRAM(s) Oral daily PRN Constipation  dextrose Oral Gel 15 Gram(s) Oral once PRN hypoglycemia  dextrose Oral Gel 15 Gram(s) Oral once PRN Blood Glucose LESS THAN 70 milliGRAM(s)/deciliter  diphenhydrAMINE Elixir 12.5 milliGRAM(s) Oral every 6 hours PRN eps prevention  diphenhydrAMINE Injectable 12.5 milliGRAM(s) IntraMuscular once PRN EPS prevention  fluPHENAZine 5 milliGRAM(s) Oral every 6 hours PRN agitation  fluPHENAZine  Injectable 5 milliGRAM(s) IntraMuscular once PRN agitation  polyethylene glycol 3350 17 Gram(s) Oral daily PRN constipation

## 2024-12-26 NOTE — BH INPATIENT PSYCHIATRY PROGRESS NOTE - NSBHFUPINTERVALHXFT_PSY_A_CORE
Patient has been more calm, remains disorganized. AVSS. Still erratic with metformin and having high post prandial fs.

## 2024-12-27 PROCEDURE — 99232 SBSQ HOSP IP/OBS MODERATE 35: CPT

## 2024-12-27 RX ADMIN — METFORMIN HYDROCHLORIDE 1000 MILLIGRAM(S): 1000 TABLET, COATED ORAL at 21:07

## 2024-12-27 RX ADMIN — Medication 1000 MILLIGRAM(S): at 20:21

## 2024-12-27 RX ADMIN — QUETIAPINE FUMARATE 200 MILLIGRAM(S): 300 TABLET ORAL at 20:19

## 2024-12-27 RX ADMIN — Medication 2 TABLET(S): at 20:20

## 2024-12-27 NOTE — BH INPATIENT PSYCHIATRY PROGRESS NOTE - NSBHFUPINTERVALHXFT_PSY_A_CORE
Patient has been more calm, remains disorganized. Refused vitals. Still erratic with metformin and having high post prandial fs. Still irritable not assaultive

## 2024-12-27 NOTE — ADVANCED PRACTICE NURSE CONSULT - RECOMMEDATIONS
Case discussed with team to continue to encourage patient and reinforce consistent carbohydrates intake.  Dietician consult was placed. Case discussed with team to continue to encourage patient and reinforce consistent carbohydrates intake.  Please see Dietician's recommendations.
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
## 2024-12-27 NOTE — ADVANCED PRACTICE NURSE CONSULT - ASSESSMENT
The patient began taking Metformin as prescribed but started to refuse due to concerns that taking Metformin would impact her pregnancy.   Upon assessment, it was determined that the patient was not pregnant.   Education was provided to explain the importance of taking Metformin as directed to prevent hyperglycemia, also discussed other options such as insulin or linagliptin.   Approximately three-quarters of the time was spent addressing the patient's concerns and providing support.  Despite the education provided, the patient remains unwilling to take Metformin or other antidiabetic agents.
Patient has been refused to take her Metformin as prescribed.  Patient reported that because she is not eating sugar there is no need for her to take her Metformin.  Attempted to educate patient on the importance to take prescribe medications.  Patient continues to refuse to take medication.
Patient with episodes of hyperglycemia due to refusal of taking diabetes medications.  Patient educated on the importance to take medication to prevent hyperglycemia.  3/4 of time spent given emotional support and listening to patient.  Patient verbalized that she would start back on Metformin not sure of doses.

## 2024-12-27 NOTE — BH INPATIENT PSYCHIATRY PROGRESS NOTE - NSBHMETABOLIC_PSY_ALL_CORE_FT
BMI: BMI (kg/m2): 29.7 (10-21-24 @ 16:58)  HbA1c: A1C with Estimated Average Glucose Result: 7.3 % (09-29-24 @ 17:15)    Glucose: POCT Blood Glucose.: 324 mg/dL (12-26-24 @ 15:41)    BP: --Vital Signs Last 24 Hrs  T(C): --  T(F): --  HR: --  BP: --  BP(mean): --  RR: --  SpO2: --      Lipid Panel: Date/Time: 09-30-24 @ 04:45  Cholesterol, Serum: 148  LDL Cholesterol Calculated: 64  HDL Cholesterol, Serum: 56  Total Cholesterol/HDL Ration Measurement: --  Triglycerides, Serum: 138

## 2024-12-28 LAB
GLUCOSE BLDC GLUCOMTR-MCNC: 237 MG/DL — HIGH (ref 70–99)
GLUCOSE BLDC GLUCOMTR-MCNC: 260 MG/DL — HIGH (ref 70–99)
GLUCOSE BLDC GLUCOMTR-MCNC: 300 MG/DL — HIGH (ref 70–99)

## 2024-12-28 RX ADMIN — Medication 1 TABLET(S): at 22:27

## 2024-12-28 RX ADMIN — QUETIAPINE FUMARATE 200 MILLIGRAM(S): 300 TABLET ORAL at 22:30

## 2024-12-28 RX ADMIN — Medication 250 MILLIGRAM(S): at 22:29

## 2024-12-28 RX ADMIN — Medication 1 TABLET(S): at 22:28

## 2024-12-28 RX ADMIN — Medication 1000 MILLIGRAM(S): at 09:57

## 2024-12-29 LAB
GLUCOSE BLDC GLUCOMTR-MCNC: 307 MG/DL — HIGH (ref 70–99)
GLUCOSE BLDC GLUCOMTR-MCNC: 321 MG/DL — HIGH (ref 70–99)
GLUCOSE BLDC GLUCOMTR-MCNC: 421 MG/DL — HIGH (ref 70–99)
GLUCOSE BLDC GLUCOMTR-MCNC: >600 MG/DL — CRITICAL HIGH (ref 70–99)

## 2024-12-29 RX ADMIN — METFORMIN HYDROCHLORIDE 1000 MILLIGRAM(S): 1000 TABLET, COATED ORAL at 16:47

## 2024-12-30 LAB — GLUCOSE BLDC GLUCOMTR-MCNC: 286 MG/DL — HIGH (ref 70–99)

## 2024-12-30 PROCEDURE — 99232 SBSQ HOSP IP/OBS MODERATE 35: CPT

## 2024-12-30 RX ORDER — LINAGLIPTIN 5 MG/1
5 TABLET, FILM COATED ORAL DAILY
Refills: 0 | Status: DISCONTINUED | OUTPATIENT
Start: 2024-12-30 | End: 2025-01-29

## 2024-12-30 RX ADMIN — LINAGLIPTIN 5 MILLIGRAM(S): 5 TABLET, FILM COATED ORAL at 12:36

## 2024-12-30 NOTE — BH INPATIENT PSYCHIATRY PROGRESS NOTE - NSBHMETABOLIC_PSY_ALL_CORE_FT
BMI: BMI (kg/m2): 29.7 (10-21-24 @ 16:58)  HbA1c: A1C with Estimated Average Glucose Result: 7.3 % (09-29-24 @ 17:15)    Glucose: POCT Blood Glucose.: 286 mg/dL (12-30-24 @ 11:56)    BP: --Vital Signs Last 24 Hrs  T(C): --  T(F): --  HR: --  BP: --  BP(mean): --  RR: --  SpO2: --      Lipid Panel: Date/Time: 09-30-24 @ 04:45  Cholesterol, Serum: 148  LDL Cholesterol Calculated: 64  HDL Cholesterol, Serum: 56  Total Cholesterol/HDL Ration Measurement: --  Triglycerides, Serum: 138

## 2024-12-30 NOTE — BH INPATIENT PSYCHIATRY PROGRESS NOTE - NSBHFUPINTERVALHXFT_PSY_A_CORE
Patient has been more calm, remains disorganized. Refused vitals. Still erratic with metformin and having high post prandial fs, had a 600 which was likley error as repeat was in 300.28s today. Still irritable not assaultive. Refused vitals. Took Trajenta today declined metformin today.

## 2024-12-30 NOTE — BH INPATIENT PSYCHIATRY PROGRESS NOTE - NSBHASSESSSUMMFT_PSY_ALL_CORE
Ms Azul is a 72 yo F w history of SCZ, Parkinson's dz, hypothyroidism, and multiple psych hospitalizations who presents for AMS. Pt transferred from [facility], where she was found to have fallen and was covered in urine. Pt has been hospitalized in [facility] with occasional stays at [facility] for medical concerns since the start of this year for psychosis. Per [facility] notes, pt appears to be awaiting transfer to [facility] hospital. Pt has been delusional at [facility] with recent delusions surrounding pregnancy. Current medications are Depakote 750 mg bid, Seroquel 50 mg bid, and Prolixin Dec 12.5 mg IM q2w (last dose 9/24).     In [facility] stroke code was negative for acute pathology. Pt refused MRI and neuro deemed it not necessary. EEG did not reveal seizure activity and toxic encephalopathy workup was also negative. Psychiatry continued Depakote 1000mg BID, Seroquel 75mg QHS and Prolixin Dec 12.5 mg IM q2w (last dose given on 10/9), next dose due 10/23/24. As per psych CL pt has been irritable, aggressive at times requiring PRN medications and restraints. Paranoid and disorganized.     On arrival to  pt continues to present paranoid delusions, mentions she was poisoned by nurses at [facility], continues to endorse delusions of pregnancy. Denies SI/HI. Requires inpatient level of care due to inability to care for herself or transition to outpatient level of care given severe episodes of agitation, aggression and disorganized behavior in the context of psychosis.        10/22 Clinical Update: Ms. Azul refused to have her vital signs taken this morning. No behavioral outbursts or aggression reported. Bloodwork reviewed and WNL. Valproic acid level within therapeutic range at 65.70. Pt is visible in the dayroom today. Seems sedated, dozing off during interview. May need Seroquel dose to be adjusted to minimize daytime sedation. Due for Prolixin dec tomorrow.    10/23: Refused VS, irritable on approach, will offer Prolixin Dec tomorrow if more agreeable.  Did take PO meds.    10/24: Episodic agitation, did require IM prn last night.  Refused SHAFER and declined AM meds.  10/25: Remains irritable, refusing meds and VS, declines SHAFER, will likely require MOO.  10/28 Patient psychotic tenuous will increase Seroquel request transfer hearing start TOO, change prn to Prolixin Benadryl as never has had much response to olanzapine  10/29 Somewhat calmer today possibly from prn and taking Seroquel last night but refused BP meds, and Depakote which may affect medical condition encourage compliance placed MOO paperwork  10/30 Selectively compliant with medications. Remains illogical, paranoid, uncooperative with care  10/31 Psychotic disorganized needing prns taking medical meds part of time. Cont meds plan TOO hearing  11/1 Calmer at moment but quite psychotic, compliance with medical and psych meds very erratic Cont meds and plan for TOO hearing  11/2: Noncompliant with medications, uncooperative with unit rules and ADL care.   11/3: Remains paranoid  11/4 Agitated tenuous poorly compliant will go to court for TOO, will move Seroquel to all PM at her request with hope this may improve compliance  11/5 Somewhat calmer today no prns, very psychotic, erratic compliance. Will offer decanoate today  she is due, plan court for TOO in AM.  11/6 Patient psychotic, not much change will give Prolixin  18.75mg today, Cont to titrate Seroquel but only hs at her request.   11/7 Patient irritable, aggressive delusional. Cont decanoate along with increasing Seroquel and using prns , await state transfer  11/8 Patient irritable more negativistic today do not feel though she has catatonic mutism as she is talking to other staff and peers other than writer. Cont to titrate Seroquel if she is taking regularly. If she refuses Depakote regularly may need to eval for transfer for IV anti convulsants.  11/9 guarded, easily irritable but more visible, no acute events overnight/this am, partially compliant with medical meds. No SI/HI reported.   11/12 guarded, uncooperative, disorganized and irritable, partially cooperative with meds, refuses vitals.   11/13 needed PRN IM and restraints for agitation, physical aggression, calmer later on. Remains delusional, disorganized and labile, partially compliant with medications.  11/14 no overnight events, no PRN needed, refused am meds, complied with hs seroquel. Remains guarded, uncooperative and dismissive, no suicidal/homicidal behavior.   11/15 no acute outbursts, no PRN needed. She does not think seroquel is helpful, only partially compliant with medications, no SI/HI. She spoke to  about abilify, to review her past medications trials.  11/18 Cont current meds including Shafer when due will re-eval Seroquel though other options limited will get ua and if patient will allow will add prn laxatives  11/19 Psychotic not much change level of agitation fluctuates while she regularly expresses paranoia and delusions of pregnancy. Cont current meds encourage compliant encourage allowing assist with ADL's. UA and C and S re-ordered  11/20 Not much change cont resistive to help with hygiene, delusional, irritable. Expressing willingness to try Abilify to [facility], will explore with patient  11/21 Patient calmer today possibly related to prn. Patient expressing willingness to try Abilify but based on past she rarely follows through. Cont current meds for now. U/A neg  no evidence UTI Incontinence likely mix of anatomical and behavioral factors  11/22: More subdued possibly some incremental benefit from regular dosing of decanoate. Will ocntinue will cont Seroquel rather than make changes when patient showing some gains, Will sl increase Seroquel as she appeared in past to benefit from 300mg/d  11/25 Modest gains overall with ongoing epsiodic agitation. Increase Seroquel give next dec of Prolixin at week 3. Ordered podiatry clinic.   11/26 Some gains with administrsation of dec next due 11/29. Plan further titration Seroquel  11/27 Modst gains. Next dec 11/29, plan further titration Seroquel  11/29 Remains with refractory illness. Cont meds, for dec today, plan further titration of Seroquel  12/2 Patient without much change, psychotic, irritable, tenuous at times. Cont treatment consider increasing Seroquel. Will ask PT to re-eval walker generally viewed as not appropriate as she may use it in unsafe way.  12/3 Patient calmer with SHAFER. Cont current meds consider increasing Seroquel. ordered dental consult. Will consider speech consult with hope advancing diet  12/4 Patient w/o much changed, scheduled for dental appointment. Not considered safe to give walker due to risk of aggression Will get labs in AM on routine   basis  12/5 Patient with some gains. Will increase Seroquel will have her seen in dental tomorrow. reviewed plan of care with sister  12/6 Patient improved modestly cont current meds plan to titrate Seroquel consider making Prolixin 25mg monthly to decrease number of injections can return to dental if agrees to extraction or any deterioration occurs  12/9 Patient partially improved cont current meds will increase dose of dec next shot to see if can give q4 weeks  12/10 Modest gains. Cont meds consider increasing Seroquel. Ordered cine to see if feasible to advance diet  12/11 Patient agitated and revealing new delusions. Plan cont to titrate Seroquel and Prolixin. will hold off on rescheduling cine to avoid inadvertently provoking her. Will lower melatonin as she blames this for sedating her leading t missed appointment  12/12 Paranoid, somatic cont current meds.  12/13 Labile, with periods of agitation. Continue current medications   12/16 Not much change cont current meds will increase fluphenazine next shot. Patient refusing to go for cine  12/17 No major changes patient refusing Depakote about half the time, if continues will discuss with neuro what options are available  12/18 Possibly more psychotic, will increase fluphenazine dec next dose.   12/19 More delusional plan increase next dec dose. Will not try at this time givwe acute IM as per court order as leads to increased agitation more than clear benefit. MAy need to return to court for order to give Insulin per court order  12/20 Irritable, angry with receiving SHAFER. Have increase Prolixin to 25mg. Will change metformin Vladislav alfonso hope she will accept a diffenent DM med that is only once a day otherwise may need to pursue TOO  12/23 Calmer since last Shafer dose still delusional. Cont curren t meds except she is willing to go back to metformin will restart and dc Trajenta  12/24 Patient calmer still irritable delusional thought disordered. FS elevated but she is now beginning to resume compliance with metformin which has been effective in fast. Cont current meds for now.   12/25: Calm but remains delusional and thought disordered   12/26 Calmer but psychotic, poorly compliant Cont meds, eval need to consider court fro diabetic treatment over objection  12/27 Less severely agitated since last SHAFER dose.Cont to encourage compliance with PO meds especially given absence of viable IM alternatives but may need to consider TOO for diabetic meds if situation worsens  12/30. Very psychotic though less tenuous and agitated. Will taper off Seroquel to minimize contributions to poor diabetic control. Added Trajenta to metformin after discussion with medicine. Will hold off on court for DM meds to see how she does with above interventions  Plan:  -[facility] 2S under C legal status  -Routine checks  -Bed block  -Continue Depakote 1000mg BID  -Continue Seroquel 150 mg QHS  -Prolixin Dec 18.75 mg received on 11/6/24.  -Neuro workup at [facility] negative for seizure activity or acute stroke

## 2024-12-30 NOTE — BH INPATIENT PSYCHIATRY PROGRESS NOTE - CURRENT MEDICATION
MEDICATIONS  (STANDING):  cholecalciferol 1000 Unit(s) Oral daily  divalproex Sprinkle 1000 milliGRAM(s) Oral two times a day  glucagon  Injectable 1 milliGRAM(s) IntraMuscular once  insulin lispro (ADMELOG) corrective regimen sliding scale   SubCutaneous at bedtime  insulin lispro (ADMELOG) corrective regimen sliding scale   SubCutaneous three times a day before meals  levothyroxine 75 MICROGram(s) Oral daily  linagliptin 5 milliGRAM(s) Oral daily  melatonin. 3 milliGRAM(s) Oral at bedtime  metFORMIN 1000 milliGRAM(s) Oral two times a day with meals  metoprolol succinate ER 50 milliGRAM(s) Oral daily  multivitamin 2 Tablet(s) Oral at bedtime  QUEtiapine 200 milliGRAM(s) Oral at bedtime  senna 2 Tablet(s) Oral at bedtime    MEDICATIONS  (PRN):  acetaminophen     Tablet .. 650 milliGRAM(s) Oral every 6 hours PRN Mild Pain (1 - 3)  albuterol    90 MICROgram(s) HFA Inhaler 2 Puff(s) Inhalation every 6 hours PRN Bronchospasm  aluminum hydroxide/magnesium hydroxide/simethicone Suspension 30 milliLiter(s) Oral every 4 hours PRN Dyspepsia  bisacodyl 5 milliGRAM(s) Oral daily PRN Constipation  dextrose Oral Gel 15 Gram(s) Oral once PRN hypoglycemia  dextrose Oral Gel 15 Gram(s) Oral once PRN Blood Glucose LESS THAN 70 milliGRAM(s)/deciliter  diphenhydrAMINE Elixir 12.5 milliGRAM(s) Oral every 6 hours PRN eps prevention  diphenhydrAMINE Injectable 12.5 milliGRAM(s) IntraMuscular once PRN EPS prevention  fluPHENAZine 5 milliGRAM(s) Oral every 6 hours PRN agitation  fluPHENAZine  Injectable 5 milliGRAM(s) IntraMuscular once PRN agitation  polyethylene glycol 3350 17 Gram(s) Oral daily PRN constipation

## 2024-12-31 PROCEDURE — 99233 SBSQ HOSP IP/OBS HIGH 50: CPT

## 2024-12-31 PROCEDURE — 99232 SBSQ HOSP IP/OBS MODERATE 35: CPT

## 2024-12-31 RX ORDER — FLUPHENAZINE HCL 5 MG
5 TABLET ORAL ONCE
Refills: 0 | Status: COMPLETED | OUTPATIENT
Start: 2024-12-31 | End: 2024-12-31

## 2024-12-31 RX ORDER — DIPHENHYDRAMINE HCL 25 MG
25 CAPSULE ORAL ONCE
Refills: 0 | Status: COMPLETED | OUTPATIENT
Start: 2024-12-31 | End: 2024-12-31

## 2024-12-31 RX ORDER — DIPHENHYDRAMINE HCL 25 MG
25 CAPSULE ORAL ONCE
Refills: 0 | Status: DISCONTINUED | OUTPATIENT
Start: 2024-12-31 | End: 2025-01-29

## 2024-12-31 RX ADMIN — Medication 5 MILLIGRAM(S): at 09:35

## 2024-12-31 RX ADMIN — Medication 25 MILLIGRAM(S): at 09:35

## 2024-12-31 NOTE — PROGRESS NOTE ADULT - ASSESSMENT
74yo F with PMH of vascular dementia, Parkinson’s disease, retinopathy with macular edema, asthma, seizure disorder, hypothyroidism, GERD, HLD, MDD, anxiety, MI, malignant neoplasm of the bladder, hydrocephalus, TERRY, schizophrenia, and HTN who was transferred to [facility] 9/29/24 for suspected seizure, transferred back to [facility] 10/21/24    # T2DM  - HbA1c 7.9% 8/27/24 when taking metformin consistently  - Not taking metformin consistently, added tradjenta 5mg QD 12/30 with goal of achieving some degree of DM control if at least she takes some pills from each, c/w metformin as well at max dose of 1000mg BID  - Will reorder labs for 1/2, hopefully pt will agree to have them drawn (A1c, CBC/BMP)  - Monitor FS, if consistently >400 should be sent to ED for insulin administration over objection and labs to ensure no DKA is developing. Currently patient has the right to refuse while at [facility] unless a medical emergency  - Continue w/ carb consistent diet and JEEVAN which pt refuses  - Per conversation w/ Psych holding off on court for DM meds for now  - Compliance encouraged with VS, labs and meds      # Seizure d/o  - c/w divalproex 1000mg po BID    # HTN  - c/w metoprolol  - avoid agents that may trigger angioedema (ACEI)  - Refusing     #Schizophrenia  - plan and meds per psych    #Parkison’s disease  - c/w carbidopa/levodopa    #Hypothyroidism  - c/w levothyroxine 75mcg po qam       72yo F with PMH of vascular dementia, Parkinson’s disease, retinopathy with macular edema, asthma, seizure disorder, hypothyroidism, GERD, HLD, MDD, anxiety, MI, malignant neoplasm of the bladder, hydrocephalus, TERRY, schizophrenia, and HTN who was transferred to [facility] 9/29/24 for suspected seizure, transferred back to [facility] 10/21/24    # T2DM  - HbA1c 7.9% 8/27/24 when taking metformin consistently  - Not taking metformin consistently, added tradjenta 5mg QD 12/30 with goal of achieving some degree of DM control if at least she takes some pills from each, c/w metformin as well at max dose of 1000mg BID  - Will reorder labs for 1/2, hopefully pt will agree to have them drawn (A1c, CBC/BMP)  - Monitor FS, if consistently >400 should be sent to ED for insulin administration over objection and labs to ensure no DKA is developing. Currently patient has the right to refuse while at [facility] unless a medical emergency  - Continue w/ carb consistent diet and JEEVAN which pt refuses  - Per conversation w/ Psych holding off on court for DM meds for now  - Compliance encouraged with VS, labs and meds    # Seizure d/o  - c/w divalproex 1000mg po BID    # HTN  - c/w metoprolol  - avoid agents that may trigger angioedema (ACEI)  - Last VS are from 12/25  - Encouraged compliance with meds and VS    # Schizophrenia  - Plan and meds per psych    # Parkinson's disease  - c/w carbidopa/levodopa    # Hypothyroidism  - c/w levothyroxine 75mcg po qam  - TSH from 9/29 is slightly elevated but FT4 is WNL    Discussed with Primary Team.

## 2024-12-31 NOTE — PROGRESS NOTE ADULT - TIME BILLING
- Ordering, reviewing, and interpreting labs, testing, and imaging  - Independently obtaining a review of systems and performing a physical exam  - Reviewing consultant documentation/recommendations in addition to discussing plan of care with consultants  - Counselling and educating patient regarding interpretation of aforementioned items and plan of care  - Documentation of encounter
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
## 2024-12-31 NOTE — BH INPATIENT PSYCHIATRY PROGRESS NOTE - CURRENT MEDICATION
MEDICATIONS  (STANDING):  cholecalciferol 1000 Unit(s) Oral daily  divalproex Sprinkle 1000 milliGRAM(s) Oral two times a day  glucagon  Injectable 1 milliGRAM(s) IntraMuscular once  insulin lispro (ADMELOG) corrective regimen sliding scale   SubCutaneous at bedtime  insulin lispro (ADMELOG) corrective regimen sliding scale   SubCutaneous three times a day before meals  levothyroxine 75 MICROGram(s) Oral daily  linagliptin 5 milliGRAM(s) Oral daily  melatonin. 3 milliGRAM(s) Oral at bedtime  metFORMIN 1000 milliGRAM(s) Oral two times a day with meals  metoprolol succinate ER 50 milliGRAM(s) Oral daily  multivitamin 2 Tablet(s) Oral at bedtime  QUEtiapine 200 milliGRAM(s) Oral at bedtime  senna 2 Tablet(s) Oral at bedtime    MEDICATIONS  (PRN):  acetaminophen     Tablet .. 650 milliGRAM(s) Oral every 6 hours PRN Mild Pain (1 - 3)  albuterol    90 MICROgram(s) HFA Inhaler 2 Puff(s) Inhalation every 6 hours PRN Bronchospasm  aluminum hydroxide/magnesium hydroxide/simethicone Suspension 30 milliLiter(s) Oral every 4 hours PRN Dyspepsia  bisacodyl 5 milliGRAM(s) Oral daily PRN Constipation  dextrose Oral Gel 15 Gram(s) Oral once PRN hypoglycemia  dextrose Oral Gel 15 Gram(s) Oral once PRN Blood Glucose LESS THAN 70 milliGRAM(s)/deciliter  diphenhydrAMINE Elixir 12.5 milliGRAM(s) Oral every 6 hours PRN eps prevention  diphenhydrAMINE Injectable 25 milliGRAM(s) IntraMuscular once PRN EPS prevention  fluPHENAZine 5 milliGRAM(s) Oral every 6 hours PRN agitation  fluPHENAZine  Injectable 5 milliGRAM(s) IntraMuscular once PRN agitation  polyethylene glycol 3350 17 Gram(s) Oral daily PRN constipation
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
## 2025-01-01 PROCEDURE — 99231 SBSQ HOSP IP/OBS SF/LOW 25: CPT

## 2025-01-01 NOTE — BH INPATIENT PSYCHIATRY PROGRESS NOTE - NSBHCHARTREVIEWVS_PSY_A_CORE FT
Vital Signs Last 24 Hrs  T(C): --  T(F): --  HR: --  BP: --  BP(mean): --  RR: 18 (01-01-25 @ 09:00) (18 - 18)  SpO2: --

## 2025-01-01 NOTE — BH INPATIENT PSYCHIATRY PROGRESS NOTE - NSBHMETABOLIC_PSY_ALL_CORE_FT
BMI: BMI (kg/m2): 29.7 (10-21-24 @ 16:58)  HbA1c: A1C with Estimated Average Glucose Result: 7.3 % (09-29-24 @ 17:15)    Glucose: POCT Blood Glucose.: 286 mg/dL (12-30-24 @ 11:56)    BP: --Vital Signs Last 24 Hrs  T(C): --  T(F): --  HR: --  BP: --  BP(mean): --  RR: 18 (01-01-25 @ 09:00) (18 - 18)  SpO2: --      Lipid Panel: Date/Time: 09-30-24 @ 04:45  Cholesterol, Serum: 148  LDL Cholesterol Calculated: 64  HDL Cholesterol, Serum: 56  Total Cholesterol/HDL Ration Measurement: --  Triglycerides, Serum: 138

## 2025-01-01 NOTE — BH INPATIENT PSYCHIATRY PROGRESS NOTE - NSBHFUPINTERVALHXFT_PSY_A_CORE
Patient seen for psychosis. Remains intermittently agitated. Refusing vitals and treatment at times, today refusing vitals.
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
## 2025-01-02 PROCEDURE — 99232 SBSQ HOSP IP/OBS MODERATE 35: CPT

## 2025-01-02 RX ORDER — ARIPIPRAZOLE 5 MG/1
5 TABLET ORAL DAILY
Refills: 0 | Status: DISCONTINUED | OUTPATIENT
Start: 2025-01-03 | End: 2025-01-23

## 2025-01-02 RX ORDER — ARIPIPRAZOLE 5 MG/1
2 TABLET ORAL ONCE
Refills: 0 | Status: COMPLETED | OUTPATIENT
Start: 2025-01-02 | End: 2025-01-02

## 2025-01-02 RX ADMIN — ARIPIPRAZOLE 2 MILLIGRAM(S): 5 TABLET ORAL at 14:21

## 2025-01-02 NOTE — BH INPATIENT PSYCHIATRY PROGRESS NOTE - NSBHASSESSSUMMFT_PSY_ALL_CORE
Ms Azul is a 72 yo F w history of SCZ, Parkinson's dz, hypothyroidism, and multiple psych hospitalizations who presents for AMS. Pt transferred from [facility], where she was found to have fallen and was covered in urine. Pt has been hospitalized in [facility] with occasional stays at [facility] for medical concerns since the start of this year for psychosis. Per [facility] notes, pt appears to be awaiting transfer to [facility] hospital. Pt has been delusional at [facility] with recent delusions surrounding pregnancy. Current medications are Depakote 750 mg bid, Seroquel 50 mg bid, and Prolixin Dec 12.5 mg IM q2w (last dose 9/24).     In [facility] stroke code was negative for acute pathology. Pt refused MRI and neuro deemed it not necessary. EEG did not reveal seizure activity and toxic encephalopathy workup was also negative. Psychiatry continued Depakote 1000mg BID, Seroquel 75mg QHS and Prolixin Dec 12.5 mg IM q2w (last dose given on 10/9), next dose due 10/23/24. As per psych CL pt has been irritable, aggressive at times requiring PRN medications and restraints. Paranoid and disorganized.     On arrival to  pt continues to present paranoid delusions, mentions she was poisoned by nurses at [facility], continues to endorse delusions of pregnancy. Denies SI/HI. Requires inpatient level of care due to inability to care for herself or transition to outpatient level of care given severe episodes of agitation, aggression and disorganized behavior in the context of psychosis.        10/22 Clinical Update: Ms. Azul refused to have her vital signs taken this morning. No behavioral outbursts or aggression reported. Bloodwork reviewed and WNL. Valproic acid level within therapeutic range at 65.70. Pt is visible in the dayroom today. Seems sedated, dozing off during interview. May need Seroquel dose to be adjusted to minimize daytime sedation. Due for Prolixin dec tomorrow.    10/23: Refused VS, irritable on approach, will offer Prolixin Dec tomorrow if more agreeable.  Did take PO meds.    10/24: Episodic agitation, did require IM prn last night.  Refused SHAFER and declined AM meds.  10/25: Remains irritable, refusing meds and VS, declines SHAFER, will likely require MOO.  10/28 Patient psychotic tenuous will increase Seroquel request transfer hearing start TOO, change prn to Prolixin Benadryl as never has had much response to olanzapine  10/29 Somewhat calmer today possibly from prn and taking Seroquel last night but refused BP meds, and Depakote which may affect medical condition encourage compliance placed MOO paperwork  10/30 Selectively compliant with medications. Remains illogical, paranoid, uncooperative with care  10/31 Psychotic disorganized needing prns taking medical meds part of time. Cont meds plan TOO hearing  11/1 Calmer at moment but quite psychotic, compliance with medical and psych meds very erratic Cont meds and plan for TOO hearing  11/2: Noncompliant with medications, uncooperative with unit rules and ADL care.   11/3: Remains paranoid  11/4 Agitated tenuous poorly compliant will go to court for TOO, will move Seroquel to all PM at her request with hope this may improve compliance  11/5 Somewhat calmer today no prns, very psychotic, erratic compliance. Will offer decanoate today  she is due, plan court for TOO in AM.  11/6 Patient psychotic, not much change will give Prolixin  18.75mg today, Cont to titrate Seroquel but only hs at her request.   11/7 Patient irritable, aggressive delusional. Cont decanoate along with increasing Seroquel and using prns , await state transfer  11/8 Patient irritable more negativistic today do not feel though she has catatonic mutism as she is talking to other staff and peers other than writer. Cont to titrate Seroquel if she is taking regularly. If she refuses Depakote regularly may need to eval for transfer for IV anti convulsants.  11/9 guarded, easily irritable but more visible, no acute events overnight/this am, partially compliant with medical meds. No SI/HI reported.   11/12 guarded, uncooperative, disorganized and irritable, partially cooperative with meds, refuses vitals.   11/13 needed PRN IM and restraints for agitation, physical aggression, calmer later on. Remains delusional, disorganized and labile, partially compliant with medications.  11/14 no overnight events, no PRN needed, refused am meds, complied with hs seroquel. Remains guarded, uncooperative and dismissive, no suicidal/homicidal behavior.   11/15 no acute outbursts, no PRN needed. She does not think seroquel is helpful, only partially compliant with medications, no SI/HI. She spoke to  about abilify, to review her past medications trials.  11/18 Cont current meds including Shafer when due will re-eval Seroquel though other options limited will get ua and if patient will allow will add prn laxatives  11/19 Psychotic not much change level of agitation fluctuates while she regularly expresses paranoia and delusions of pregnancy. Cont current meds encourage compliant encourage allowing assist with ADL's. UA and C and S re-ordered  11/20 Not much change cont resistive to help with hygiene, delusional, irritable. Expressing willingness to try Abilify to [facility], will explore with patient  11/21 Patient calmer today possibly related to prn. Patient expressing willingness to try Abilify but based on past she rarely follows through. Cont current meds for now. U/A neg  no evidence UTI Incontinence likely mix of anatomical and behavioral factors  11/22: More subdued possibly some incremental benefit from regular dosing of decanoate. Will ocntinue will cont Seroquel rather than make changes when patient showing some gains, Will sl increase Seroquel as she appeared in past to benefit from 300mg/d  11/25 Modest gains overall with ongoing epsiodic agitation. Increase Seroquel give next dec of Prolixin at week 3. Ordered podiatry clinic.   11/26 Some gains with administrsation of dec next due 11/29. Plan further titration Seroquel  11/27 Modst gains. Next dec 11/29, plan further titration Seroquel  11/29 Remains with refractory illness. Cont meds, for dec today, plan further titration of Seroquel  12/2 Patient without much change, psychotic, irritable, tenuous at times. Cont treatment consider increasing Seroquel. Will ask PT to re-eval walker generally viewed as not appropriate as she may use it in unsafe way.  12/3 Patient calmer with SHAFER. Cont current meds consider increasing Seroquel. ordered dental consult. Will consider speech consult with hope advancing diet  12/4 Patient w/o much changed, scheduled for dental appointment. Not considered safe to give walker due to risk of aggression Will get labs in AM on routine   basis  12/5 Patient with some gains. Will increase Seroquel will have her seen in dental tomorrow. reviewed plan of care with sister  12/6 Patient improved modestly cont current meds plan to titrate Seroquel consider making Prolixin 25mg monthly to decrease number of injections can return to dental if agrees to extraction or any deterioration occurs  12/9 Patient partially improved cont current meds will increase dose of dec next shot to see if can give q4 weeks  12/10 Modest gains. Cont meds consider increasing Seroquel. Ordered cine to see if feasible to advance diet  12/11 Patient agitated and revealing new delusions. Plan cont to titrate Seroquel and Prolixin. will hold off on rescheduling cine to avoid inadvertently provoking her. Will lower melatonin as she blames this for sedating her leading t missed appointment  12/12 Paranoid, somatic cont current meds.  12/13 Labile, with periods of agitation. Continue current medications   12/16 Not much change cont current meds will increase fluphenazine next shot. Patient refusing to go for cine  12/17 No major changes patient refusing Depakote about half the time, if continues will discuss with neuro what options are available  12/18 Possibly more psychotic, will increase fluphenazine dec next dose.   12/19 More delusional plan increase next dec dose. Will not try at this time givwe acute IM as per court order as leads to increased agitation more than clear benefit. MAy need to return to court for order to give Insulin per court order  12/20 Irritable, angry with receiving SHAFER. Have increase Prolixin to 25mg. Will change metformin Vladislav alfonso hope she will accept a diffenent DM med that is only once a day otherwise may need to pursue TOO  12/23 Calmer since last Shafer dose still delusional. Cont curren t meds except she is willing to go back to metformin will restart and dc Trajenta  12/24 Patient calmer still irritable delusional thought disordered. FS elevated but she is now beginning to resume compliance with metformin which has been effective in fast. Cont current meds for now.   12/25: Calm but remains delusional and thought disordered   12/26 Calmer but psychotic, poorly compliant Cont meds, eval need to consider court fro diabetic treatment over objection  12/27 Less severely agitated since last SHAFER dose.Cont to encourage compliance with PO meds especially given absence of viable IM alternatives but may need to consider TOO for diabetic meds if situation worsens  12/30. Very psychotic though less tenuous and agitated. Will taper off Seroquel to minimize contributions to poor diabetic control. Added Trajenta to metformin after discussion with medicine. Will hold off on court for DM meds to see how she does with above interventions  12/31 Patient very psychotic, aggressive. Cont current meds, consider increase Prolixin next shot or moving up dejon of injection. Consider alternates to Seroquel given sugars high and she is uncooperative with diabetic meds. Family informed of restraint  1/1: Remains intermittently agitated, aggressive, today disorganized but not aggressive, able to be redirected   1/2 Patient less agitated but paranoid, irritable, delusional about pregnancy, refusing most po meds. Reviewed with medicine, no clinical emergency will cont to encourage compliance. Note refusing Depakote but last EEG 9/30 neg for epileptiform activity Will d/c Seroquel as refusing and may increase glucose and will offer Abilify.   Plan:  -[facility] 2S under 2PC legal status  -Routine checks  -Bed block  -Continue Depakote 1000mg BID  -Continue Seroquel 150 mg QHS  -Prolixin Dec 18.75 mg received on 11/6/24.  -Neuro workup at [facility] negative for seizure activity or acute stroke

## 2025-01-03 PROCEDURE — 99231 SBSQ HOSP IP/OBS SF/LOW 25: CPT

## 2025-01-03 RX ORDER — FLUPHENAZINE HCL 5 MG
5 TABLET ORAL ONCE
Refills: 0 | Status: COMPLETED | OUTPATIENT
Start: 2025-01-03 | End: 2025-01-03

## 2025-01-03 RX ORDER — DIPHENHYDRAMINE HCL 25 MG
25 CAPSULE ORAL ONCE
Refills: 0 | Status: COMPLETED | OUTPATIENT
Start: 2025-01-03 | End: 2025-01-03

## 2025-01-03 RX ADMIN — Medication 5 MILLIGRAM(S): at 21:23

## 2025-01-03 RX ADMIN — Medication 25 MILLIGRAM(S): at 21:23

## 2025-01-03 NOTE — BH INPATIENT PSYCHIATRY PROGRESS NOTE - NSBHMETABOLIC_PSY_ALL_CORE_FT
BMI: BMI (kg/m2): 29.7 (10-21-24 @ 16:58)  HbA1c: A1C with Estimated Average Glucose Result: 7.3 % (09-29-24 @ 17:15)    Glucose: POCT Blood Glucose.: 286 mg/dL (12-30-24 @ 11:56)    BP: 136/110 (01-02-25 @ 12:44) (136/110 - 136/110)Vital Signs Last 24 Hrs  T(C): --  T(F): --  HR: --  BP: --  BP(mean): --  RR: --  SpO2: --      Lipid Panel: Date/Time: 09-30-24 @ 04:45  Cholesterol, Serum: 148  LDL Cholesterol Calculated: 64  HDL Cholesterol, Serum: 56  Total Cholesterol/HDL Ration Measurement: --  Triglycerides, Serum: 138

## 2025-01-03 NOTE — BH INPATIENT PSYCHIATRY PROGRESS NOTE - CURRENT MEDICATION
MEDICATIONS  (STANDING):  ARIPiprazole 5 milliGRAM(s) Oral daily  cholecalciferol 1000 Unit(s) Oral daily  divalproex Sprinkle 1000 milliGRAM(s) Oral two times a day  glucagon  Injectable 1 milliGRAM(s) IntraMuscular once  insulin lispro (ADMELOG) corrective regimen sliding scale   SubCutaneous at bedtime  insulin lispro (ADMELOG) corrective regimen sliding scale   SubCutaneous three times a day before meals  levothyroxine 75 MICROGram(s) Oral daily  linagliptin 5 milliGRAM(s) Oral daily  melatonin. 3 milliGRAM(s) Oral at bedtime  metFORMIN 1000 milliGRAM(s) Oral two times a day with meals  metoprolol succinate ER 50 milliGRAM(s) Oral daily  multivitamin 2 Tablet(s) Oral at bedtime  senna 2 Tablet(s) Oral at bedtime    MEDICATIONS  (PRN):  acetaminophen     Tablet .. 650 milliGRAM(s) Oral every 6 hours PRN Mild Pain (1 - 3)  albuterol    90 MICROgram(s) HFA Inhaler 2 Puff(s) Inhalation every 6 hours PRN Bronchospasm  aluminum hydroxide/magnesium hydroxide/simethicone Suspension 30 milliLiter(s) Oral every 4 hours PRN Dyspepsia  bisacodyl 5 milliGRAM(s) Oral daily PRN Constipation  dextrose Oral Gel 15 Gram(s) Oral once PRN hypoglycemia  dextrose Oral Gel 15 Gram(s) Oral once PRN Blood Glucose LESS THAN 70 milliGRAM(s)/deciliter  diphenhydrAMINE Elixir 12.5 milliGRAM(s) Oral every 6 hours PRN eps prevention  diphenhydrAMINE Injectable 25 milliGRAM(s) IntraMuscular once PRN EPS prevention  fluPHENAZine 5 milliGRAM(s) Oral every 6 hours PRN agitation  fluPHENAZine  Injectable 5 milliGRAM(s) IntraMuscular once PRN agitation  polyethylene glycol 3350 17 Gram(s) Oral daily PRN constipation

## 2025-01-03 NOTE — BH INPATIENT PSYCHIATRY PROGRESS NOTE - NSBHFUPINTERVALHXFT_PSY_A_CORE
Patient seen for psychosis. Case discussed with nursing. Patient continues to have periods of intermittent agitation, disruptive, refusing fingersticks, vitals and most medications, including antidiabetic medications. Seen in day area, elevated, singing.

## 2025-01-03 NOTE — BH CHART NOTE - NSEVENTNOTEFT_PSY_ALL_CORE
Paged at 2042 for activation of IM medication due to patient agitation. Per staff patient has been yelling, cursing in day room, urinating on herself while walking around unit, unable to be redirected, refusing PO prn medications. Prolixin 5mg IM activated for threat to self and others due to psychotic agitation. Benadryl 25 mg IM x 1 also activated for EPS ppx. Advised RN staff to notify [facility] if patient continues to be agitated.

## 2025-01-03 NOTE — BH INPATIENT PSYCHIATRY PROGRESS NOTE - NSBHASSESSSUMMFT_PSY_ALL_CORE
Ms Azul is a 74 yo F w history of SCZ, Parkinson's dz, hypothyroidism, and multiple psych hospitalizations who presents for AMS. Pt transferred from [facility], where she was found to have fallen and was covered in urine. Pt has been hospitalized in [facility] with occasional stays at [facility] for medical concerns since the start of this year for psychosis. Per [facility] notes, pt appears to be awaiting transfer to [facility] hospital. Pt has been delusional at [facility] with recent delusions surrounding pregnancy. Current medications are Depakote 750 mg bid, Seroquel 50 mg bid, and Prolixin Dec 12.5 mg IM q2w (last dose 9/24).     In [facility] stroke code was negative for acute pathology. Pt refused MRI and neuro deemed it not necessary. EEG did not reveal seizure activity and toxic encephalopathy workup was also negative. Psychiatry continued Depakote 1000mg BID, Seroquel 75mg QHS and Prolixin Dec 12.5 mg IM q2w (last dose given on 10/9), next dose due 10/23/24. As per psych CL pt has been irritable, aggressive at times requiring PRN medications and restraints. Paranoid and disorganized.     On arrival to  pt continues to present paranoid delusions, mentions she was poisoned by nurses at [facility], continues to endorse delusions of pregnancy. Denies SI/HI. Requires inpatient level of care due to inability to care for herself or transition to outpatient level of care given severe episodes of agitation, aggression and disorganized behavior in the context of psychosis.    10/22 Clinical Update: Ms. Azul refused to have her vital signs taken this morning. No behavioral outbursts or aggression reported. Bloodwork reviewed and WNL. Valproic acid level within therapeutic range at 65.70. Pt is visible in the dayroom today. Seems sedated, dozing off during interview. May need Seroquel dose to be adjusted to minimize daytime sedation. Due for Prolixin dec tomorrow.    10/23: Refused VS, irritable on approach, will offer Prolixin Dec tomorrow if more agreeable.  Did take PO meds.    10/24: Episodic agitation, did require IM prn last night.  Refused SHAFER and declined AM meds.  10/25: Remains irritable, refusing meds and VS, declines SHAFER, will likely require MOO.  10/28 Patient psychotic tenuous will increase Seroquel request transfer hearing start TOO, change prn to Prolixin Benadryl as never has had much response to olanzapine  10/29 Somewhat calmer today possibly from prn and taking Seroquel last night but refused BP meds, and Depakote which may affect medical condition encourage compliance placed MOO paperwork  10/30 Selectively compliant with medications. Remains illogical, paranoid, uncooperative with care  10/31 Psychotic disorganized needing prns taking medical meds part of time. Cont meds plan TOO hearing  11/1 Calmer at moment but quite psychotic, compliance with medical and psych meds very erratic Cont meds and plan for TOO hearing  11/2: Noncompliant with medications, uncooperative with unit rules and ADL care.   11/3: Remains paranoid  11/4 Agitated tenuous poorly compliant will go to court for TOO, will move Seroquel to all PM at her request with hope this may improve compliance  11/5 Somewhat calmer today no prns, very psychotic, erratic compliance. Will offer decanoate today  she is due, plan court for TOO in AM.  11/6 Patient psychotic, not much change will give Prolixin  18.75mg today, Cont to titrate Seroquel but only hs at her request.   11/7 Patient irritable, aggressive delusional. Cont decanoate along with increasing Seroquel and using prns , await state transfer  11/8 Patient irritable more negativistic today do not feel though she has catatonic mutism as she is talking to other staff and peers other than writer. Cont to titrate Seroquel if she is taking regularly. If she refuses Depakote regularly may need to eval for transfer for IV anti convulsants.  11/9 guarded, easily irritable but more visible, no acute events overnight/this am, partially compliant with medical meds. No SI/HI reported.   11/12 guarded, uncooperative, disorganized and irritable, partially cooperative with meds, refuses vitals.   11/13 needed PRN IM and restraints for agitation, physical aggression, calmer later on. Remains delusional, disorganized and labile, partially compliant with medications.  11/14 no overnight events, no PRN needed, refused am meds, complied with hs seroquel. Remains guarded, uncooperative and dismissive, no suicidal/homicidal behavior.   11/15 no acute outbursts, no PRN needed. She does not think seroquel is helpful, only partially compliant with medications, no SI/HI. She spoke to  about abilify, to review her past medications trials.  11/18 Cont current meds including Shafer when due will re-eval Seroquel though other options limited will get ua and if patient will allow will add prn laxatives  11/19 Psychotic not much change level of agitation fluctuates while she regularly expresses paranoia and delusions of pregnancy. Cont current meds encourage compliant encourage allowing assist with ADL's. UA and C and S re-ordered  11/20 Not much change cont resistive to help with hygiene, delusional, irritable. Expressing willingness to try Abilify to [facility], will explore with patient  11/21 Patient calmer today possibly related to prn. Patient expressing willingness to try Abilify but based on past she rarely follows through. Cont current meds for now. U/A neg  no evidence UTI Incontinence likely mix of anatomical and behavioral factors  11/22: More subdued possibly some incremental benefit from regular dosing of decanoate. Will ocntinue will cont Seroquel rather than make changes when patient showing some gains, Will sl increase Seroquel as she appeared in past to benefit from 300mg/d  11/25 Modest gains overall with ongoing epsiodic agitation. Increase Seroquel give next dec of Prolixin at week 3. Ordered podiatry clinic.   11/26 Some gains with administrsation of dec next due 11/29. Plan further titration Seroquel  11/27 Modst gains. Next dec 11/29, plan further titration Seroquel  11/29 Remains with refractory illness. Cont meds, for dec today, plan further titration of Seroquel  12/2 Patient without much change, psychotic, irritable, tenuous at times. Cont treatment consider increasing Seroquel. Will ask PT to re-eval walker generally viewed as not appropriate as she may use it in unsafe way.  12/3 Patient calmer with SHAFER. Cont current meds consider increasing Seroquel. ordered dental consult. Will consider speech consult with hope advancing diet  12/4 Patient w/o much changed, scheduled for dental appointment. Not considered safe to give walker due to risk of aggression Will get labs in AM on routine   basis  12/5 Patient with some gains. Will increase Seroquel will have her seen in dental tomorrow. reviewed plan of care with sister  12/6 Patient improved modestly cont current meds plan to titrate Seroquel consider making Prolixin 25mg monthly to decrease number of injections can return to dental if agrees to extraction or any deterioration occurs  12/9 Patient partially improved cont current meds will increase dose of dec next shot to see if can give q4 weeks  12/10 Modest gains. Cont meds consider increasing Seroquel. Ordered cine to see if feasible to advance diet  12/11 Patient agitated and revealing new delusions. Plan cont to titrate Seroquel and Prolixin. will hold off on rescheduling cine to avoid inadvertently provoking her. Will lower melatonin as she blames this for sedating her leading t missed appointment  12/12 Paranoid, somatic cont current meds.  12/13 Labile, with periods of agitation. Continue current medications   12/16 Not much change cont current meds will increase fluphenazine next shot. Patient refusing to go for cine  12/17 No major changes patient refusing Depakote about half the time, if continues will discuss with neuro what options are available  12/18 Possibly more psychotic, will increase fluphenazine dec next dose.   12/19 More delusional plan increase next dec dose. Will not try at this time givwe acute IM as per court order as leads to increased agitation more than clear benefit. MAy need to return to court for order to give Insulin per court order  12/20 Irritable, angry with receiving SHAFER. Have increase Prolixin to 25mg. Will change metformin Vladislav alfonso hope she will accept a diffenent DM med that is only once a day otherwise may need to pursue TOO  12/23 Calmer since last Shafer dose still delusional. Cont curren t meds except she is willing to go back to metformin will restart and dc Trajenta  12/24 Patient calmer still irritable delusional thought disordered. FS elevated but she is now beginning to resume compliance with metformin which has been effective in fast. Cont current meds for now.   12/25: Calm but remains delusional and thought disordered   12/26 Calmer but psychotic, poorly compliant Cont meds, eval need to consider court fro diabetic treatment over objection  12/27 Less severely agitated since last SHAFER dose.Cont to encourage compliance with PO meds especially given absence of viable IM alternatives but may need to consider TOO for diabetic meds if situation worsens  12/30. Very psychotic though less tenuous and agitated. Will taper off Seroquel to minimize contributions to poor diabetic control. Added Trajenta to metformin after discussion with medicine. Will hold off on court for DM meds to see how she does with above interventions  12/31 Patient very psychotic, aggressive. Cont current meds, consider increase Prolixin next shot or moving up dejon of injection. Consider alternates to Seroquel given sugars high and she is uncooperative with diabetic meds. Family informed of restraint  1/1: Remains intermittently agitated, aggressive, today disorganized but not aggressive, able to be redirected   1/2 Patient less agitated but paranoid, irritable, delusional about pregnancy, refusing most po meds. Reviewed with medicine, no clinical emergency will cont to encourage compliance. Note refusing Depakote but last EEG 9/30 neg for epileptiform activity Will d/c Seroquel as refusing and may increase glucose and will offer Abilify.   1/3 Pt has periods of irritability, refusing FS and vitals though seen active in day area, disorganized. Took dose of Abilify yesterday though nonadherent today.  Plan:  -[facility] 2S under 2PC legal status  -Routine checks  -Bed block  -Continue Depakote 1000mg BID  -Continue Seroquel 150 mg QHS  -Prolixin Dec 18.75 mg received on 11/6/24.  -Neuro workup at [facility] negative for seizure activity or acute stroke

## 2025-01-04 PROCEDURE — 99231 SBSQ HOSP IP/OBS SF/LOW 25: CPT

## 2025-01-04 RX ORDER — DIPHENHYDRAMINE HCL 25 MG
25 CAPSULE ORAL ONCE
Refills: 0 | Status: COMPLETED | OUTPATIENT
Start: 2025-01-04 | End: 2025-01-04

## 2025-01-04 RX ORDER — DIPHENHYDRAMINE HCL 25 MG
25 CAPSULE ORAL ONCE
Refills: 0 | Status: DISCONTINUED | OUTPATIENT
Start: 2025-01-04 | End: 2025-01-04

## 2025-01-04 RX ORDER — FLUPHENAZINE HCL 5 MG
5 TABLET ORAL ONCE
Refills: 0 | Status: DISCONTINUED | OUTPATIENT
Start: 2025-01-04 | End: 2025-01-04

## 2025-01-04 RX ORDER — FLUPHENAZINE HCL 5 MG
5 TABLET ORAL ONCE
Refills: 0 | Status: COMPLETED | OUTPATIENT
Start: 2025-01-04 | End: 2025-01-04

## 2025-01-04 RX ADMIN — Medication 25 MILLIGRAM(S): at 12:43

## 2025-01-04 RX ADMIN — Medication 5 MILLIGRAM(S): at 12:43

## 2025-01-04 RX ADMIN — Medication 25 MILLIGRAM(S): at 10:23

## 2025-01-04 RX ADMIN — Medication 5 MILLIGRAM(S): at 10:23

## 2025-01-04 NOTE — BH CHART NOTE - NSEVENTNOTEFT_PSY_ALL_CORE
Interval History:  Paged at ~9:15 for activation of IM medication due to patient agitation. Per staff patient has been yelling obscenities, urinating on herself and walking around unit while urinating, attempting to strike staff, unable to be redirected, refusing PO prn medications. Psych emergency activated, IM Prolixin 5mg activated for threat to self and others due to psychotic agitation. Benadryl 25 mg IM x 1 also activated for EPS ppx. Patient continued to be agitated and manual hold done from 5086-6678 and patient then directly placed in 4-point restraints starting at 2133.    After restraint placement, physical exam completed. Patient placed in restraints comfortably but extremely irritable and verbally aggressive. Previous PRN medication with limited effect. Patient acutely disorganized in behavior and became combative/peed on staff when they attempted to change her out of her urine soaked clothes.     T(C): --  HR: --  BP: --  RR: --  SpO2: --    Physical Exam:  Gen: Patient placed comfortably in restraints, NAD, malodorous, bucking against restraints  HEENT: NC/AT,  EOMI.   Resp: Breathing comfortably, nonlabored   Ext: Restraints placed appropriately on all extremities (able to easily fit 2 fingers under each restraint). No clubbing, edema, or cyanosis. 5/5 strength throughout all extremities. 2+ pulses of all extremities.   Neuro: awake, alert, grossly oriented.     Assessment:  Shane called for patient agitation, SI/HI, and violence towards staff requiring IM PRN medication and 4-point restraints for continued agitation, aggression, and threatening behavior. Patient placed comfortably in restraints. They are clinically stable with exam otherwise unremarkable.     Plan:  - Continue restraints for threat of harm to self/others. Release patient in shortest time possible  - Vitals q2h --> will reassess clinically every hour for need to continue restraints   - skin checks due to combativeness/bucking against restraints    Discussed with RN staff who agree with plan.    Addendum: Patient reassessed at 2233, less agitated but remained tenuous, cursing out writer and screaming about being sexually assaulted by aliens. Physical exam remained as noted above. Restraints continued at 2233. Patient released from restraints at 2320.   Interval History:  Paged at ~2123 for activation of IM medication due to patient agitation. Per staff patient has been yelling obscenities, urinating on herself and walking around unit while urinating, attempting to strike staff, unable to be redirected, refusing PO prn medications. Psych emergency activated, IM Prolixin 5mg activated for threat to self and others due to psychotic agitation. Benadryl 25 mg IM x 1 also activated for EPS ppx. Patient continued to be agitated and manual hold done from 0913-2797 and patient then directly placed in 4-point restraints starting at 2133.    After restraint placement, physical exam completed. Patient placed in restraints comfortably but extremely irritable and verbally aggressive. Previous PRN medication with limited effect. Patient acutely disorganized in behavior and became combative/peed on staff when they attempted to change her out of her urine soaked clothes.     T(C): --  HR: --  BP: --  RR: --  SpO2: --    Physical Exam:  Gen: Patient placed comfortably in restraints, NAD, malodorous, bucking against restraints  HEENT: NC/AT,  EOMI.   Resp: Breathing comfortably, nonlabored   Ext: Restraints placed appropriately on all extremities (able to easily fit 2 fingers under each restraint). No clubbing, edema, or cyanosis. 5/5 strength throughout all extremities. 2+ pulses of all extremities.   Neuro: awake, alert, grossly oriented.     Assessment:  Shane called for patient agitation, SI/HI, and violence towards staff requiring IM PRN medication and 4-point restraints for continued agitation, aggression, and threatening behavior. Patient placed comfortably in restraints. They are clinically stable with exam otherwise unremarkable.     Plan:  - Continue restraints for threat of harm to self/others. Release patient in shortest time possible  - Vitals q2h --> will reassess clinically every hour for need to continue restraints   - skin checks due to combativeness/bucking against restraints    Discussed with RN staff who agree with plan.    Addendum: Patient reassessed at 2233, less agitated but remained tenuous, cursing out writer and screaming about being sexually assaulted by aliens. Physical exam remained as noted above. Restraints continued at 2233. Patient released from restraints at 2320.

## 2025-01-04 NOTE — BH INPATIENT PSYCHIATRY PROGRESS NOTE - NSBHFUPINTERVALHXFT_PSY_A_CORE
Patient seen for psychosis. Very agitated this AM, disorganized, putting food and drink on her head, throwing things in her room. Required prn meds.

## 2025-01-04 NOTE — BH CHART NOTE - NSEVENTNOTEFT_PSY_ALL_CORE
SAUL paged at 9:56 AM for patient agitation, per staff pt began throwing food items at staff, urinated on herself in her bedroom, yelling that she needed to take a shower. Pt also took containers of milk and poured it on her own head. When staff attempted to intervene she screamed that they are "trying to look at me" and accused staff of trying to look at her genital area. Yelling that she needed to shower but became aggressive with staff when they attempted to assist her. IM prolixin 5 mg activated for agitation driven by psychotic paranoia and IM benadryl 25 mg activated for EPS prophylaxis.     Psych emergency then called at 10:15 AM. Pt was taken to quiet room in migue chair, started screaming at staff and accusing them of trying to "peek at her" while staff were transporting pt to sensory room. IMs given with good effect, pt placed comfortably in 4 point restraints due to continued agitation. Pt placed in restraints at 10:23 AM.     Interval History:  After restraint placement, physical exam completed. Patient placed in restraints comfortably. Previous PRN medication with modest effect. Patient denies physical pain or complaints.     T(C): --  HR: --  BP: --  RR: --  SpO2: --    Physical Exam:  Gen: Patient placed comfortably in restraints, NAD  HEENT: NC/AT, EOMI  Resp: Breathing comfortably, nonlabored   Ext: Restraints placed appropriately on all extremities (able to easily fit 2 fingers under each restraint). No clubbing, edema, or cyanosis.  Neuro: awake, alert, grossly oriented.     Assessment:  Saul called for patient agitation, SI/HI, and violence towards staff requiring IM PRN medication and 4-point restraints for continued agitation, aggression, and threatening behavior. Patient placed comfortably in restraints. They are clinically stable with exam otherwise unremarkable.     Plan:  - Continue restraints for threat of harm to self/others. Release patient in shortest time possible  - Vitals q2h --> will reassess clinically every hour for need to continue restraints     Discussed with RN staff who agree with plan.

## 2025-01-04 NOTE — BH INPATIENT PSYCHIATRY PROGRESS NOTE - NSBHASSESSSUMMFT_PSY_ALL_CORE
"David Mcintosh is a 67 y.o. male patient.    Temp: 97.6 °F (36.4 °C) (11/10/20 1600)  Pulse: (!) 58 (11/11/20 1125)  Resp: 15 (11/11/20 1125)  BP: (!) 79/60 (11/11/20 1100)  SpO2: 100 % (11/11/20 1125)  Weight: 66.5 kg (146 lb 9.7 oz) (11/09/20 1600)  Height: 6' 1" (185.4 cm) (11/09/20 0902)       Central Line  Performed by: Lolly Goff MD  Authorized by: Lolly Goff MD     Location procedure was performed:  [facility] MEDICINE  Assisting Provider:  Andrew Nguyen MD  Pre-operative diagnosis:  Hyponatremia  Post-operative diagnosis:  Hyponatremia  Consent Done ?:  Emergent Situation  Time out complete?: Verified correct patient, procedure, equipment, staff, and site/side    Indications:  Med administration, vascular access and hemodynamic monitoring  Preparation:  Skin prepped with ChloraPrep  Skin prep agent dried: Skin prep agent completely dried prior to procedure    Sterile barriers: All five maximal sterile barriers used - gloves, gown, cap, mask and large sterile sheet    Hand hygiene: Hand hygiene performed immediately prior to central venous catheter insertion    Location:  Right internal jugular  Catheter type:  Triple lumen  Catheter size:  7 Fr  Ultrasound guidance: Yes    Vessel Caliber:  Large   patent  Comprressibility:  Normal  Needle advanced into vessel with real time ultrasound guidance.    Guidewire confirmed in vessel.    Steril sheath on probe.    Sterile gel used.  Manometry: No    Number of attempts:  1  Securement:  Line sutured  Complications: No    Estimated blood loss (mL):  0  XRay:  Placement verified by x-ray, no pneumothorax on x-ray and tip termination  Adverse Events:  None        Lolly Goff  11/11/2020  " Ms Azul is a 72 yo F w history of SCZ, Parkinson's dz, hypothyroidism, and multiple psych hospitalizations who presents for AMS. Pt transferred from [facility], where she was found to have fallen and was covered in urine. Pt has been hospitalized in [facility] with occasional stays at [facility] for medical concerns since the start of this year for psychosis. Per [facility] notes, pt appears to be awaiting transfer to [facility] hospital. Pt has been delusional at [facility] with recent delusions surrounding pregnancy. Current medications are Depakote 750 mg bid, Seroquel 50 mg bid, and Prolixin Dec 12.5 mg IM q2w (last dose 9/24).     In [facility] stroke code was negative for acute pathology. Pt refused MRI and neuro deemed it not necessary. EEG did not reveal seizure activity and toxic encephalopathy workup was also negative. Psychiatry continued Depakote 1000mg BID, Seroquel 75mg QHS and Prolixin Dec 12.5 mg IM q2w (last dose given on 10/9), next dose due 10/23/24. As per psych CL pt has been irritable, aggressive at times requiring PRN medications and restraints. Paranoid and disorganized.     On arrival to  pt continues to present paranoid delusions, mentions she was poisoned by nurses at [facility], continues to endorse delusions of pregnancy. Denies SI/HI. Requires inpatient level of care due to inability to care for herself or transition to outpatient level of care given severe episodes of agitation, aggression and disorganized behavior in the context of psychosis.    10/22 Clinical Update: Ms. Azul refused to have her vital signs taken this morning. No behavioral outbursts or aggression reported. Bloodwork reviewed and WNL. Valproic acid level within therapeutic range at 65.70. Pt is visible in the dayroom today. Seems sedated, dozing off during interview. May need Seroquel dose to be adjusted to minimize daytime sedation. Due for Prolixin dec tomorrow.    10/23: Refused VS, irritable on approach, will offer Prolixin Dec tomorrow if more agreeable.  Did take PO meds.    10/24: Episodic agitation, did require IM prn last night.  Refused SHAFER and declined AM meds.  10/25: Remains irritable, refusing meds and VS, declines SHAFER, will likely require MOO.  10/28 Patient psychotic tenuous will increase Seroquel request transfer hearing start TOO, change prn to Prolixin Benadryl as never has had much response to olanzapine  10/29 Somewhat calmer today possibly from prn and taking Seroquel last night but refused BP meds, and Depakote which may affect medical condition encourage compliance placed MOO paperwork  10/30 Selectively compliant with medications. Remains illogical, paranoid, uncooperative with care  10/31 Psychotic disorganized needing prns taking medical meds part of time. Cont meds plan TOO hearing  11/1 Calmer at moment but quite psychotic, compliance with medical and psych meds very erratic Cont meds and plan for TOO hearing  11/2: Noncompliant with medications, uncooperative with unit rules and ADL care.   11/3: Remains paranoid  11/4 Agitated tenuous poorly compliant will go to court for TOO, will move Seroquel to all PM at her request with hope this may improve compliance  11/5 Somewhat calmer today no prns, very psychotic, erratic compliance. Will offer decanoate today  she is due, plan court for TOO in AM.  11/6 Patient psychotic, not much change will give Prolixin  18.75mg today, Cont to titrate Seroquel but only hs at her request.   11/7 Patient irritable, aggressive delusional. Cont decanoate along with increasing Seroquel and using prns , await state transfer  11/8 Patient irritable more negativistic today do not feel though she has catatonic mutism as she is talking to other staff and peers other than writer. Cont to titrate Seroquel if she is taking regularly. If she refuses Depakote regularly may need to eval for transfer for IV anti convulsants.  11/9 guarded, easily irritable but more visible, no acute events overnight/this am, partially compliant with medical meds. No SI/HI reported.   11/12 guarded, uncooperative, disorganized and irritable, partially cooperative with meds, refuses vitals.   11/13 needed PRN IM and restraints for agitation, physical aggression, calmer later on. Remains delusional, disorganized and labile, partially compliant with medications.  11/14 no overnight events, no PRN needed, refused am meds, complied with hs seroquel. Remains guarded, uncooperative and dismissive, no suicidal/homicidal behavior.   11/15 no acute outbursts, no PRN needed. She does not think seroquel is helpful, only partially compliant with medications, no SI/HI. She spoke to  about abilify, to review her past medications trials.  11/18 Cont current meds including Shafer when due will re-eval Seroquel though other options limited will get ua and if patient will allow will add prn laxatives  11/19 Psychotic not much change level of agitation fluctuates while she regularly expresses paranoia and delusions of pregnancy. Cont current meds encourage compliant encourage allowing assist with ADL's. UA and C and S re-ordered  11/20 Not much change cont resistive to help with hygiene, delusional, irritable. Expressing willingness to try Abilify to [facility], will explore with patient  11/21 Patient calmer today possibly related to prn. Patient expressing willingness to try Abilify but based on past she rarely follows through. Cont current meds for now. U/A neg  no evidence UTI Incontinence likely mix of anatomical and behavioral factors  11/22: More subdued possibly some incremental benefit from regular dosing of decanoate. Will ocntinue will cont Seroquel rather than make changes when patient showing some gains, Will sl increase Seroquel as she appeared in past to benefit from 300mg/d  11/25 Modest gains overall with ongoing epsiodic agitation. Increase Seroquel give next dec of Prolixin at week 3. Ordered podiatry clinic.   11/26 Some gains with administrsation of dec next due 11/29. Plan further titration Seroquel  11/27 Modst gains. Next dec 11/29, plan further titration Seroquel  11/29 Remains with refractory illness. Cont meds, for dec today, plan further titration of Seroquel  12/2 Patient without much change, psychotic, irritable, tenuous at times. Cont treatment consider increasing Seroquel. Will ask PT to re-eval walker generally viewed as not appropriate as she may use it in unsafe way.  12/3 Patient calmer with HSAFER. Cont current meds consider increasing Seroquel. ordered dental consult. Will consider speech consult with hope advancing diet  12/4 Patient w/o much changed, scheduled for dental appointment. Not considered safe to give walker due to risk of aggression Will get labs in AM on routine   basis  12/5 Patient with some gains. Will increase Seroquel will have her seen in dental tomorrow. reviewed plan of care with sister  12/6 Patient improved modestly cont current meds plan to titrate Seroquel consider making Prolixin 25mg monthly to decrease number of injections can return to dental if agrees to extraction or any deterioration occurs  12/9 Patient partially improved cont current meds will increase dose of dec next shot to see if can give q4 weeks  12/10 Modest gains. Cont meds consider increasing Seroquel. Ordered cine to see if feasible to advance diet  12/11 Patient agitated and revealing new delusions. Plan cont to titrate Seroquel and Prolixin. will hold off on rescheduling cine to avoid inadvertently provoking her. Will lower melatonin as she blames this for sedating her leading t missed appointment  12/12 Paranoid, somatic cont current meds.  12/13 Labile, with periods of agitation. Continue current medications   12/16 Not much change cont current meds will increase fluphenazine next shot. Patient refusing to go for cine  12/17 No major changes patient refusing Depakote about half the time, if continues will discuss with neuro what options are available  12/18 Possibly more psychotic, will increase fluphenazine dec next dose.   12/19 More delusional plan increase next dec dose. Will not try at this time givwe acute IM as per court order as leads to increased agitation more than clear benefit. MAy need to return to court for order to give Insulin per court order  12/20 Irritable, angry with receiving SHAFER. Have increase Prolixin to 25mg. Will change metformin Vladislav alfonso hope she will accept a diffenent DM med that is only once a day otherwise may need to pursue TOO  12/23 Calmer since last Shafer dose still delusional. Cont curren t meds except she is willing to go back to metformin will restart and dc Trajenta  12/24 Patient calmer still irritable delusional thought disordered. FS elevated but she is now beginning to resume compliance with metformin which has been effective in fast. Cont current meds for now.   12/25: Calm but remains delusional and thought disordered   12/26 Calmer but psychotic, poorly compliant Cont meds, eval need to consider court fro diabetic treatment over objection  12/27 Less severely agitated since last SHAFER dose.Cont to encourage compliance with PO meds especially given absence of viable IM alternatives but may need to consider TOO for diabetic meds if situation worsens  12/30. Very psychotic though less tenuous and agitated. Will taper off Seroquel to minimize contributions to poor diabetic control. Added Trajenta to metformin after discussion with medicine. Will hold off on court for DM meds to see how she does with above interventions  12/31 Patient very psychotic, aggressive. Cont current meds, consider increase Prolixin next shot or moving up dejon of injection. Consider alternates to Seroquel given sugars high and she is uncooperative with diabetic meds. Family informed of restraint  1/1: Remains intermittently agitated, aggressive, today disorganized but not aggressive, able to be redirected   1/2 Patient less agitated but paranoid, irritable, delusional about pregnancy, refusing most po meds. Reviewed with medicine, no clinical emergency will cont to encourage compliance. Note refusing Depakote but last EEG 9/30 neg for epileptiform activity Will d/c Seroquel as refusing and may increase glucose and will offer Abilify.   1/3 Pt has periods of irritability, refusing FS and vitals though seen active in day area, disorganized. Took dose of Abilify yesterday though nonadherent today.  1/4: Agitated and disorganized today, throwing things, pouring drink on her head, sitting in her urine and refusing to be cleaned, requires prn meds   Plan:  -[facility] 2S under 2PC legal status  -Routine checks  -Bed block  -Continue Depakote 1000mg BID  -Continue Seroquel 150 mg QHS  -Prolixin Dec 18.75 mg received on 11/6/24.  -Neuro workup at [facility] negative for seizure activity or acute stroke

## 2025-01-05 PROCEDURE — 99231 SBSQ HOSP IP/OBS SF/LOW 25: CPT

## 2025-01-05 NOTE — BH INPATIENT PSYCHIATRY PROGRESS NOTE - NSBHASSESSSUMMFT_PSY_ALL_CORE
Ms Azul is a 74 yo F w history of SCZ, Parkinson's dz, hypothyroidism, and multiple psych hospitalizations who presents for AMS. Pt transferred from [facility], where she was found to have fallen and was covered in urine. Pt has been hospitalized in [facility] with occasional stays at [facility] for medical concerns since the start of this year for psychosis. Per [facility] notes, pt appears to be awaiting transfer to [facility] hospital. Pt has been delusional at [facility] with recent delusions surrounding pregnancy. Current medications are Depakote 750 mg bid, Seroquel 50 mg bid, and Prolixin Dec 12.5 mg IM q2w (last dose 9/24).     In [facility] stroke code was negative for acute pathology. Pt refused MRI and neuro deemed it not necessary. EEG did not reveal seizure activity and toxic encephalopathy workup was also negative. Psychiatry continued Depakote 1000mg BID, Seroquel 75mg QHS and Prolixin Dec 12.5 mg IM q2w (last dose given on 10/9), next dose due 10/23/24. As per psych CL pt has been irritable, aggressive at times requiring PRN medications and restraints. Paranoid and disorganized.     On arrival to  pt continues to present paranoid delusions, mentions she was poisoned by nurses at [facility], continues to endorse delusions of pregnancy. Denies SI/HI. Requires inpatient level of care due to inability to care for herself or transition to outpatient level of care given severe episodes of agitation, aggression and disorganized behavior in the context of psychosis.    10/22 Clinical Update: Ms. Azul refused to have her vital signs taken this morning. No behavioral outbursts or aggression reported. Bloodwork reviewed and WNL. Valproic acid level within therapeutic range at 65.70. Pt is visible in the dayroom today. Seems sedated, dozing off during interview. May need Seroquel dose to be adjusted to minimize daytime sedation. Due for Prolixin dec tomorrow.    10/23: Refused VS, irritable on approach, will offer Prolixin Dec tomorrow if more agreeable.  Did take PO meds.    10/24: Episodic agitation, did require IM prn last night.  Refused SHAFER and declined AM meds.  10/25: Remains irritable, refusing meds and VS, declines SHAFER, will likely require MOO.  10/28 Patient psychotic tenuous will increase Seroquel request transfer hearing start TOO, change prn to Prolixin Benadryl as never has had much response to olanzapine  10/29 Somewhat calmer today possibly from prn and taking Seroquel last night but refused BP meds, and Depakote which may affect medical condition encourage compliance placed MOO paperwork  10/30 Selectively compliant with medications. Remains illogical, paranoid, uncooperative with care  10/31 Psychotic disorganized needing prns taking medical meds part of time. Cont meds plan TOO hearing  11/1 Calmer at moment but quite psychotic, compliance with medical and psych meds very erratic Cont meds and plan for TOO hearing  11/2: Noncompliant with medications, uncooperative with unit rules and ADL care.   11/3: Remains paranoid  11/4 Agitated tenuous poorly compliant will go to court for TOO, will move Seroquel to all PM at her request with hope this may improve compliance  11/5 Somewhat calmer today no prns, very psychotic, erratic compliance. Will offer decanoate today  she is due, plan court for TOO in AM.  11/6 Patient psychotic, not much change will give Prolixin  18.75mg today, Cont to titrate Seroquel but only hs at her request.   11/7 Patient irritable, aggressive delusional. Cont decanoate along with increasing Seroquel and using prns , await state transfer  11/8 Patient irritable more negativistic today do not feel though she has catatonic mutism as she is talking to other staff and peers other than writer. Cont to titrate Seroquel if she is taking regularly. If she refuses Depakote regularly may need to eval for transfer for IV anti convulsants.  11/9 guarded, easily irritable but more visible, no acute events overnight/this am, partially compliant with medical meds. No SI/HI reported.   11/12 guarded, uncooperative, disorganized and irritable, partially cooperative with meds, refuses vitals.   11/13 needed PRN IM and restraints for agitation, physical aggression, calmer later on. Remains delusional, disorganized and labile, partially compliant with medications.  11/14 no overnight events, no PRN needed, refused am meds, complied with hs seroquel. Remains guarded, uncooperative and dismissive, no suicidal/homicidal behavior.   11/15 no acute outbursts, no PRN needed. She does not think seroquel is helpful, only partially compliant with medications, no SI/HI. She spoke to  about abilify, to review her past medications trials.  11/18 Cont current meds including Shafer when due will re-eval Seroquel though other options limited will get ua and if patient will allow will add prn laxatives  11/19 Psychotic not much change level of agitation fluctuates while she regularly expresses paranoia and delusions of pregnancy. Cont current meds encourage compliant encourage allowing assist with ADL's. UA and C and S re-ordered  11/20 Not much change cont resistive to help with hygiene, delusional, irritable. Expressing willingness to try Abilify to [facility], will explore with patient  11/21 Patient calmer today possibly related to prn. Patient expressing willingness to try Abilify but based on past she rarely follows through. Cont current meds for now. U/A neg  no evidence UTI Incontinence likely mix of anatomical and behavioral factors  11/22: More subdued possibly some incremental benefit from regular dosing of decanoate. Will ocntinue will cont Seroquel rather than make changes when patient showing some gains, Will sl increase Seroquel as she appeared in past to benefit from 300mg/d  11/25 Modest gains overall with ongoing epsiodic agitation. Increase Seroquel give next dec of Prolixin at week 3. Ordered podiatry clinic.   11/26 Some gains with administrsation of dec next due 11/29. Plan further titration Seroquel  11/27 Modst gains. Next dec 11/29, plan further titration Seroquel  11/29 Remains with refractory illness. Cont meds, for dec today, plan further titration of Seroquel  12/2 Patient without much change, psychotic, irritable, tenuous at times. Cont treatment consider increasing Seroquel. Will ask PT to re-eval walker generally viewed as not appropriate as she may use it in unsafe way.  12/3 Patient calmer with SHAFER. Cont current meds consider increasing Seroquel. ordered dental consult. Will consider speech consult with hope advancing diet  12/4 Patient w/o much changed, scheduled for dental appointment. Not considered safe to give walker due to risk of aggression Will get labs in AM on routine   basis  12/5 Patient with some gains. Will increase Seroquel will have her seen in dental tomorrow. reviewed plan of care with sister  12/6 Patient improved modestly cont current meds plan to titrate Seroquel consider making Prolixin 25mg monthly to decrease number of injections can return to dental if agrees to extraction or any deterioration occurs  12/9 Patient partially improved cont current meds will increase dose of dec next shot to see if can give q4 weeks  12/10 Modest gains. Cont meds consider increasing Seroquel. Ordered cine to see if feasible to advance diet  12/11 Patient agitated and revealing new delusions. Plan cont to titrate Seroquel and Prolixin. will hold off on rescheduling cine to avoid inadvertently provoking her. Will lower melatonin as she blames this for sedating her leading t missed appointment  12/12 Paranoid, somatic cont current meds.  12/13 Labile, with periods of agitation. Continue current medications   12/16 Not much change cont current meds will increase fluphenazine next shot. Patient refusing to go for cine  12/17 No major changes patient refusing Depakote about half the time, if continues will discuss with neuro what options are available  12/18 Possibly more psychotic, will increase fluphenazine dec next dose.   12/19 More delusional plan increase next dec dose. Will not try at this time givwe acute IM as per court order as leads to increased agitation more than clear benefit. MAy need to return to court for order to give Insulin per court order  12/20 Irritable, angry with receiving SHAFER. Have increase Prolixin to 25mg. Will change metformin Vladislav alfonso hope she will accept a diffenent DM med that is only once a day otherwise may need to pursue TOO  12/23 Calmer since last Shafer dose still delusional. Cont curren t meds except she is willing to go back to metformin will restart and dc Trajenta  12/24 Patient calmer still irritable delusional thought disordered. FS elevated but she is now beginning to resume compliance with metformin which has been effective in fast. Cont current meds for now.   12/25: Calm but remains delusional and thought disordered   12/26 Calmer but psychotic, poorly compliant Cont meds, eval need to consider court fro diabetic treatment over objection  12/27 Less severely agitated since last SHAFER dose.Cont to encourage compliance with PO meds especially given absence of viable IM alternatives but may need to consider TOO for diabetic meds if situation worsens  12/30. Very psychotic though less tenuous and agitated. Will taper off Seroquel to minimize contributions to poor diabetic control. Added Trajenta to metformin after discussion with medicine. Will hold off on court for DM meds to see how she does with above interventions  12/31 Patient very psychotic, aggressive. Cont current meds, consider increase Prolixin next shot or moving up dejon of injection. Consider alternates to Seroquel given sugars high and she is uncooperative with diabetic meds. Family informed of restraint  1/1: Remains intermittently agitated, aggressive, today disorganized but not aggressive, able to be redirected   1/2 Patient less agitated but paranoid, irritable, delusional about pregnancy, refusing most po meds. Reviewed with medicine, no clinical emergency will cont to encourage compliance. Note refusing Depakote but last EEG 9/30 neg for epileptiform activity Will d/c Seroquel as refusing and may increase glucose and will offer Abilify.   1/3 Pt has periods of irritability, refusing FS and vitals though seen active in day area, disorganized. Took dose of Abilify yesterday though nonadherent today.  1/4-1/5: Agitated and disorganized on 1/4 throwing things, pouring drink on her head, sitting in her urine and refusing to be cleaned, requires prn meds but better on 1/5  Plan:  -[facility] 2S under [facility]PC legal status  -Routine checks  -Bed block  -Continue Depakote 1000mg BID  -Continue Seroquel 150 mg QHS  -Prolixin Dec 18.75 mg received on 11/6/24.  -Neuro workup at [facility] negative for seizure activity or acute stroke

## 2025-01-06 PROCEDURE — 99232 SBSQ HOSP IP/OBS MODERATE 35: CPT
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
## 2025-01-07 PROCEDURE — 99232 SBSQ HOSP IP/OBS MODERATE 35: CPT

## 2025-01-07 RX ORDER — DIPHENHYDRAMINE HCL 25 MG
25 CAPSULE ORAL ONCE
Refills: 0 | Status: DISCONTINUED | OUTPATIENT
Start: 2025-01-07 | End: 2025-01-07

## 2025-01-07 RX ORDER — FLUPHENAZINE HCL 5 MG
5 TABLET ORAL ONCE
Refills: 0 | Status: DISCONTINUED | OUTPATIENT
Start: 2025-01-07 | End: 2025-01-07

## 2025-01-07 RX ADMIN — Medication 2 TABLET(S): at 21:27

## 2025-01-07 RX ADMIN — ACETAMINOPHEN, DIPHENHYDRAMINE HCL, PHENYLEPHRINE HCL 3 MILLIGRAM(S): 325; 25; 5 TABLET ORAL at 21:27

## 2025-01-07 RX ADMIN — Medication 1000 MILLIGRAM(S): at 21:27

## 2025-01-07 NOTE — ED ADULT NURSE NOTE - GENITOURINARY ASSESSMENT
Detail Level: Detailed
Quality 226: Preventive Care And Screening: Tobacco Use: Screening And Cessation Intervention: Patient screened for tobacco use and is an ex/non-smoker
Quality 431: Preventive Care And Screening: Unhealthy Alcohol Use - Screening: Patient not identified as an unhealthy alcohol user when screened for unhealthy alcohol use using a systematic screening method
- - -
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
## 2025-01-07 NOTE — BH INPATIENT PSYCHIATRY PROGRESS NOTE - NSBHFUPINTERVALHXFT_PSY_A_CORE
Patient seen for psychosis.  Refusing vitals , fs, refusing  oral meds. Eating sleeping well, haf Glucerna in addition to meal

## 2025-01-08 PROCEDURE — 99232 SBSQ HOSP IP/OBS MODERATE 35: CPT

## 2025-01-08 RX ADMIN — Medication 1000 MILLIGRAM(S): at 22:27

## 2025-01-08 RX ADMIN — Medication 2 TABLET(S): at 22:27

## 2025-01-08 NOTE — BH INPATIENT PSYCHIATRY PROGRESS NOTE - NSBHASSESSSUMMFT_PSY_ALL_CORE
Ms Azul is a 74 yo F w history of SCZ, Parkinson's dz, hypothyroidism, and multiple psych hospitalizations who presents for AMS. Pt transferred from [facility], where she was found to have fallen and was covered in urine. Pt has been hospitalized in [facility] with occasional stays at [facility] for medical concerns since the start of this year for psychosis. Per [facility] notes, pt appears to be awaiting transfer to [facility] hospital. Pt has been delusional at [facility] with recent delusions surrounding pregnancy. Current medications are Depakote 750 mg bid, Seroquel 50 mg bid, and Prolixin Dec 12.5 mg IM q2w (last dose 9/24).     In [facility] stroke code was negative for acute pathology. Pt refused MRI and neuro deemed it not necessary. EEG did not reveal seizure activity and toxic encephalopathy workup was also negative. Psychiatry continued Depakote 1000mg BID, Seroquel 75mg QHS and Prolixin Dec 12.5 mg IM q2w (last dose given on 10/9), next dose due 10/23/24. As per psych CL pt has been irritable, aggressive at times requiring PRN medications and restraints. Paranoid and disorganized.     On arrival to  pt continues to present paranoid delusions, mentions she was poisoned by nurses at [facility], continues to endorse delusions of pregnancy. Denies SI/HI. Requires inpatient level of care due to inability to care for herself or transition to outpatient level of care given severe episodes of agitation, aggression and disorganized behavior in the context of psychosis.    10/22 Clinical Update: Ms. Azul refused to have her vital signs taken this morning. No behavioral outbursts or aggression reported. Bloodwork reviewed and WNL. Valproic acid level within therapeutic range at 65.70. Pt is visible in the dayroom today. Seems sedated, dozing off during interview. May need Seroquel dose to be adjusted to minimize daytime sedation. Due for Prolixin dec tomorrow.    10/23: Refused VS, irritable on approach, will offer Prolixin Dec tomorrow if more agreeable.  Did take PO meds.    10/24: Episodic agitation, did require IM prn last night.  Refused SHAFER and declined AM meds.  10/25: Remains irritable, refusing meds and VS, declines SHAFER, will likely require MOO.  10/28 Patient psychotic tenuous will increase Seroquel request transfer hearing start TOO, change prn to Prolixin Benadryl as never has had much response to olanzapine  10/29 Somewhat calmer today possibly from prn and taking Seroquel last night but refused BP meds, and Depakote which may affect medical condition encourage compliance placed MOO paperwork  10/30 Selectively compliant with medications. Remains illogical, paranoid, uncooperative with care  10/31 Psychotic disorganized needing prns taking medical meds part of time. Cont meds plan TOO hearing  11/1 Calmer at moment but quite psychotic, compliance with medical and psych meds very erratic Cont meds and plan for TOO hearing  11/2: Noncompliant with medications, uncooperative with unit rules and ADL care.   11/3: Remains paranoid  11/4 Agitated tenuous poorly compliant will go to court for TOO, will move Seroquel to all PM at her request with hope this may improve compliance  11/5 Somewhat calmer today no prns, very psychotic, erratic compliance. Will offer decanoate today  she is due, plan court for TOO in AM.  11/6 Patient psychotic, not much change will give Prolixin  18.75mg today, Cont to titrate Seroquel but only hs at her request.   11/7 Patient irritable, aggressive delusional. Cont decanoate along with increasing Seroquel and using prns , await state transfer  11/8 Patient irritable more negativistic today do not feel though she has catatonic mutism as she is talking to other staff and peers other than writer. Cont to titrate Seroquel if she is taking regularly. If she refuses Depakote regularly may need to eval for transfer for IV anti convulsants.  11/9 guarded, easily irritable but more visible, no acute events overnight/this am, partially compliant with medical meds. No SI/HI reported.   11/12 guarded, uncooperative, disorganized and irritable, partially cooperative with meds, refuses vitals.   11/13 needed PRN IM and restraints for agitation, physical aggression, calmer later on. Remains delusional, disorganized and labile, partially compliant with medications.  11/14 no overnight events, no PRN needed, refused am meds, complied with hs seroquel. Remains guarded, uncooperative and dismissive, no suicidal/homicidal behavior.   11/15 no acute outbursts, no PRN needed. She does not think seroquel is helpful, only partially compliant with medications, no SI/HI. She spoke to  about abilify, to review her past medications trials.  11/18 Cont current meds including Shafer when due will re-eval Seroquel though other options limited will get ua and if patient will allow will add prn laxatives  11/19 Psychotic not much change level of agitation fluctuates while she regularly expresses paranoia and delusions of pregnancy. Cont current meds encourage compliant encourage allowing assist with ADL's. UA and C and S re-ordered  11/20 Not much change cont resistive to help with hygiene, delusional, irritable. Expressing willingness to try Abilify to [facility], will explore with patient  11/21 Patient calmer today possibly related to prn. Patient expressing willingness to try Abilify but based on past she rarely follows through. Cont current meds for now. U/A neg  no evidence UTI Incontinence likely mix of anatomical and behavioral factors  11/22: More subdued possibly some incremental benefit from regular dosing of decanoate. Will ocntinue will cont Seroquel rather than make changes when patient showing some gains, Will sl increase Seroquel as she appeared in past to benefit from 300mg/d  11/25 Modest gains overall with ongoing epsiodic agitation. Increase Seroquel give next dec of Prolixin at week 3. Ordered podiatry clinic.   11/26 Some gains with administrsation of dec next due 11/29. Plan further titration Seroquel  11/27 Modst gains. Next dec 11/29, plan further titration Seroquel  11/29 Remains with refractory illness. Cont meds, for dec today, plan further titration of Seroquel  12/2 Patient without much change, psychotic, irritable, tenuous at times. Cont treatment consider increasing Seroquel. Will ask PT to re-eval walker generally viewed as not appropriate as she may use it in unsafe way.  12/3 Patient calmer with SHAFER. Cont current meds consider increasing Seroquel. ordered dental consult. Will consider speech consult with hope advancing diet  12/4 Patient w/o much changed, scheduled for dental appointment. Not considered safe to give walker due to risk of aggression Will get labs in AM on routine   basis  12/5 Patient with some gains. Will increase Seroquel will have her seen in dental tomorrow. reviewed plan of care with sister  12/6 Patient improved modestly cont current meds plan to titrate Seroquel consider making Prolixin 25mg monthly to decrease number of injections can return to dental if agrees to extraction or any deterioration occurs  12/9 Patient partially improved cont current meds will increase dose of dec next shot to see if can give q4 weeks  12/10 Modest gains. Cont meds consider increasing Seroquel. Ordered cine to see if feasible to advance diet  12/11 Patient agitated and revealing new delusions. Plan cont to titrate Seroquel and Prolixin. will hold off on rescheduling cine to avoid inadvertently provoking her. Will lower melatonin as she blames this for sedating her leading t missed appointment  12/12 Paranoid, somatic cont current meds.  12/13 Labile, with periods of agitation. Continue current medications   12/16 Not much change cont current meds will increase fluphenazine next shot. Patient refusing to go for cine  12/17 No major changes patient refusing Depakote about half the time, if continues will discuss with neuro what options are available  12/18 Possibly more psychotic, will increase fluphenazine dec next dose.   12/19 More delusional plan increase next dec dose. Will not try at this time givwe acute IM as per court order as leads to increased agitation more than clear benefit. MAy need to return to court for order to give Insulin per court order  12/20 Irritable, angry with receiving SHAFER. Have increase Prolixin to 25mg. Will change metformin Vladislav alfonso hope she will accept a diffenent DM med that is only once a day otherwise may need to pursue TOO  12/23 Calmer since last Shafer dose still delusional. Cont curren t meds except she is willing to go back to metformin will restart and dc Trajenta  12/24 Patient calmer still irritable delusional thought disordered. FS elevated but she is now beginning to resume compliance with metformin which has been effective in fast. Cont current meds for now.   12/25: Calm but remains delusional and thought disordered   12/26 Calmer but psychotic, poorly compliant Cont meds, eval need to consider court fro diabetic treatment over objection  12/27 Less severely agitated since last SHAFER dose.Cont to encourage compliance with PO meds especially given absence of viable IM alternatives but may need to consider TOO for diabetic meds if situation worsens  12/30. Very psychotic though less tenuous and agitated. Will taper off Seroquel to minimize contributions to poor diabetic control. Added Trajenta to metformin after discussion with medicine. Will hold off on court for DM meds to see how she does with above interventions  12/31 Patient very psychotic, aggressive. Cont current meds, consider increase Prolixin next shot or moving up dejon of injection. Consider alternates to Seroquel given sugars high and she is uncooperative with diabetic meds. Family informed of restraint  1/1: Remains intermittently agitated, aggressive, today disorganized but not aggressive, able to be redirected   1/2 Patient less agitated but paranoid, irritable, delusional about pregnancy, refusing most po meds. Reviewed with medicine, no clinical emergency will cont to encourage compliance. Note refusing Depakote but last EEG 9/30 neg for epileptiform activity Will d/c Seroquel as refusing and may increase glucose and will offer Abilify.   1/3 Pt has periods of irritability, refusing FS and vitals though seen active in day area, disorganized. Took dose of Abilify yesterday though nonadherent today.  1/4-1/5: Agitated and disorganized on 1/4 throwing things, pouring drink on her head, sitting in her urine and refusing to be cleaned, requires prn meds but better on 1/5 1/6 Patient refusing all po meds, having periods of agitation, cont to encourage compliance, consider moving up next dec and or increasing dose. Med refuses remain non emergent. Will consider TOO for whatever meds can be given parenterally  1/7 Patient grossly psychotic, no severe agitation/aggression. Refusing po meds. Feel risk benefit of giving IM antipsychotic is poor as the process results in extreme agitation/agression, need for restraints with only brief calming effect. Similar right now risk benefit of getting court order for diabetic meds is not favorable but can be re-evaluated  1/8 No major changes. Level of agitation irritability tend to be variable. Cont current meds w/o change  Plan:  -[facility] under [facility] legal status  -Routine checks  -Bed block  -Continue Depakote 1000mg BID  -Continue Seroquel 150 mg QHS  -Prolixin Dec 18.75 mg received on 11/6/24.  -Neuro workup at [facility] negative for seizure activity or acute stroke

## 2025-01-08 NOTE — BH INPATIENT PSYCHIATRY PROGRESS NOTE - NSBHMETABOLIC_PSY_ALL_CORE_FT
BMI: BMI (kg/m2): 29.7 (10-21-24 @ 16:58)  HbA1c: A1C with Estimated Average Glucose Result: 7.3 % (09-29-24 @ 17:15)    Glucose: POCT Blood Glucose.: 286 mg/dL (12-30-24 @ 11:56)    BP: 133/82 (01-08-25 @ 07:57) (133/82 - 133/82)Vital Signs Last 24 Hrs  T(C): --  T(F): --  HR: --  BP: 133/82 (01-08-25 @ 07:57) (133/82 - 133/82)  BP(mean): 84 (01-08-25 @ 07:57) (84 - 84)  RR: --  SpO2: --      Lipid Panel: Date/Time: 09-30-24 @ 04:45  Cholesterol, Serum: 148  LDL Cholesterol Calculated: 64  HDL Cholesterol, Serum: 56  Total Cholesterol/HDL Ration Measurement: --  Triglycerides, Serum: 138

## 2025-01-08 NOTE — BH INPATIENT PSYCHIATRY PROGRESS NOTE - NSBHFUPINTERVALHXFT_PSY_A_CORE
Patient seen for psychosis.  VSS , fs, refusing fs. Eating sleeping well, calmer today. Took some meds last night. Agreed to two 2 month extension of stay

## 2025-01-08 NOTE — BH INPATIENT PSYCHIATRY PROGRESS NOTE - NSBHCHARTREVIEWVS_PSY_A_CORE FT
Vital Signs Last 24 Hrs  T(C): --  T(F): --  HR: --  BP: 133/82 (01-08-25 @ 07:57) (133/82 - 133/82)  BP(mean): 84 (01-08-25 @ 07:57) (84 - 84)  RR: --  SpO2: --

## 2025-01-09 PROCEDURE — 99232 SBSQ HOSP IP/OBS MODERATE 35: CPT

## 2025-01-09 RX ORDER — FLUPHENAZINE HCL 5 MG
25 TABLET ORAL ONCE
Refills: 0 | Status: COMPLETED | OUTPATIENT
Start: 2025-01-13 | End: 2025-01-13

## 2025-01-09 RX ADMIN — Medication 2 TABLET(S): at 21:55

## 2025-01-09 RX ADMIN — Medication 1000 MILLIGRAM(S): at 21:54

## 2025-01-09 NOTE — PROGRESS NOTE ADULT - ASSESSMENT
Assessment/plan:    Onychomycosis secondary to dermatophytes  Diabetes mellitus    Patient was uncooperative and unsettled during evaluation.  Incomplete treatment planning performed today.  Will return next week for attempt at debridement of mycotic nails all 10 digits.  Recommend patient continue with routine podiatric diabetic foot care as an outpatient if discharged.

## 2025-01-09 NOTE — BH INPATIENT PSYCHIATRY PROGRESS NOTE - NSBHASSESSSUMMFT_PSY_ALL_CORE
Ms Azul is a 72 yo F w history of SCZ, Parkinson's dz, hypothyroidism, and multiple psych hospitalizations who presents for AMS. Pt transferred from [facility], where she was found to have fallen and was covered in urine. Pt has been hospitalized in [facility] with occasional stays at [facility] for medical concerns since the start of this year for psychosis. Per [facility] notes, pt appears to be awaiting transfer to [facility] hospital. Pt has been delusional at [facility] with recent delusions surrounding pregnancy. Current medications are Depakote 750 mg bid, Seroquel 50 mg bid, and Prolixin Dec 12.5 mg IM q2w (last dose 9/24).     In [facility] stroke code was negative for acute pathology. Pt refused MRI and neuro deemed it not necessary. EEG did not reveal seizure activity and toxic encephalopathy workup was also negative. Psychiatry continued Depakote 1000mg BID, Seroquel 75mg QHS and Prolixin Dec 12.5 mg IM q2w (last dose given on 10/9), next dose due 10/23/24. As per psych CL pt has been irritable, aggressive at times requiring PRN medications and restraints. Paranoid and disorganized.     On arrival to  pt continues to present paranoid delusions, mentions she was poisoned by nurses at [facility], continues to endorse delusions of pregnancy. Denies SI/HI. Requires inpatient level of care due to inability to care for herself or transition to outpatient level of care given severe episodes of agitation, aggression and disorganized behavior in the context of psychosis.    10/22 Clinical Update: Ms. Azul refused to have her vital signs taken this morning. No behavioral outbursts or aggression reported. Bloodwork reviewed and WNL. Valproic acid level within therapeutic range at 65.70. Pt is visible in the dayroom today. Seems sedated, dozing off during interview. May need Seroquel dose to be adjusted to minimize daytime sedation. Due for Prolixin dec tomorrow.    10/23: Refused VS, irritable on approach, will offer Prolixin Dec tomorrow if more agreeable.  Did take PO meds.    10/24: Episodic agitation, did require IM prn last night.  Refused SHAFER and declined AM meds.  10/25: Remains irritable, refusing meds and VS, declines SHAFER, will likely require MOO.  10/28 Patient psychotic tenuous will increase Seroquel request transfer hearing start TOO, change prn to Prolixin Benadryl as never has had much response to olanzapine  10/29 Somewhat calmer today possibly from prn and taking Seroquel last night but refused BP meds, and Depakote which may affect medical condition encourage compliance placed MOO paperwork  10/30 Selectively compliant with medications. Remains illogical, paranoid, uncooperative with care  10/31 Psychotic disorganized needing prns taking medical meds part of time. Cont meds plan TOO hearing  11/1 Calmer at moment but quite psychotic, compliance with medical and psych meds very erratic Cont meds and plan for TOO hearing  11/2: Noncompliant with medications, uncooperative with unit rules and ADL care.   11/3: Remains paranoid  11/4 Agitated tenuous poorly compliant will go to court for TOO, will move Seroquel to all PM at her request with hope this may improve compliance  11/5 Somewhat calmer today no prns, very psychotic, erratic compliance. Will offer decanoate today  she is due, plan court for TOO in AM.  11/6 Patient psychotic, not much change will give Prolixin  18.75mg today, Cont to titrate Seroquel but only hs at her request.   11/7 Patient irritable, aggressive delusional. Cont decanoate along with increasing Seroquel and using prns , await state transfer  11/8 Patient irritable more negativistic today do not feel though she has catatonic mutism as she is talking to other staff and peers other than writer. Cont to titrate Seroquel if she is taking regularly. If she refuses Depakote regularly may need to eval for transfer for IV anti convulsants.  11/9 guarded, easily irritable but more visible, no acute events overnight/this am, partially compliant with medical meds. No SI/HI reported.   11/12 guarded, uncooperative, disorganized and irritable, partially cooperative with meds, refuses vitals.   11/13 needed PRN IM and restraints for agitation, physical aggression, calmer later on. Remains delusional, disorganized and labile, partially compliant with medications.  11/14 no overnight events, no PRN needed, refused am meds, complied with hs seroquel. Remains guarded, uncooperative and dismissive, no suicidal/homicidal behavior.   11/15 no acute outbursts, no PRN needed. She does not think seroquel is helpful, only partially compliant with medications, no SI/HI. She spoke to  about abilify, to review her past medications trials.  11/18 Cont current meds including Shafer when due will re-eval Seroquel though other options limited will get ua and if patient will allow will add prn laxatives  11/19 Psychotic not much change level of agitation fluctuates while she regularly expresses paranoia and delusions of pregnancy. Cont current meds encourage compliant encourage allowing assist with ADL's. UA and C and S re-ordered  11/20 Not much change cont resistive to help with hygiene, delusional, irritable. Expressing willingness to try Abilify to [facility], will explore with patient  11/21 Patient calmer today possibly related to prn. Patient expressing willingness to try Abilify but based on past she rarely follows through. Cont current meds for now. U/A neg  no evidence UTI Incontinence likely mix of anatomical and behavioral factors  11/22: More subdued possibly some incremental benefit from regular dosing of decanoate. Will ocntinue will cont Seroquel rather than make changes when patient showing some gains, Will sl increase Seroquel as she appeared in past to benefit from 300mg/d  11/25 Modest gains overall with ongoing epsiodic agitation. Increase Seroquel give next dec of Prolixin at week 3. Ordered podiatry clinic.   11/26 Some gains with administrsation of dec next due 11/29. Plan further titration Seroquel  11/27 Modst gains. Next dec 11/29, plan further titration Seroquel  11/29 Remains with refractory illness. Cont meds, for dec today, plan further titration of Seroquel  12/2 Patient without much change, psychotic, irritable, tenuous at times. Cont treatment consider increasing Seroquel. Will ask PT to re-eval walker generally viewed as not appropriate as she may use it in unsafe way.  12/3 Patient calmer with SHAFER. Cont current meds consider increasing Seroquel. ordered dental consult. Will consider speech consult with hope advancing diet  12/4 Patient w/o much changed, scheduled for dental appointment. Not considered safe to give walker due to risk of aggression Will get labs in AM on routine   basis  12/5 Patient with some gains. Will increase Seroquel will have her seen in dental tomorrow. reviewed plan of care with sister  12/6 Patient improved modestly cont current meds plan to titrate Seroquel consider making Prolixin 25mg monthly to decrease number of injections can return to dental if agrees to extraction or any deterioration occurs  12/9 Patient partially improved cont current meds will increase dose of dec next shot to see if can give q4 weeks  12/10 Modest gains. Cont meds consider increasing Seroquel. Ordered cine to see if feasible to advance diet  12/11 Patient agitated and revealing new delusions. Plan cont to titrate Seroquel and Prolixin. will hold off on rescheduling cine to avoid inadvertently provoking her. Will lower melatonin as she blames this for sedating her leading t missed appointment  12/12 Paranoid, somatic cont current meds.  12/13 Labile, with periods of agitation. Continue current medications   12/16 Not much change cont current meds will increase fluphenazine next shot. Patient refusing to go for cine  12/17 No major changes patient refusing Depakote about half the time, if continues will discuss with neuro what options are available  12/18 Possibly more psychotic, will increase fluphenazine dec next dose.   12/19 More delusional plan increase next dec dose. Will not try at this time givwe acute IM as per court order as leads to increased agitation more than clear benefit. MAy need to return to court for order to give Insulin per court order  12/20 Irritable, angry with receiving SHAFER. Have increase Prolixin to 25mg. Will change metformin Vladislav alfonso hope she will accept a diffenent DM med that is only once a day otherwise may need to pursue TOO  12/23 Calmer since last Shafer dose still delusional. Cont curren t meds except she is willing to go back to metformin will restart and dc Trajenta  12/24 Patient calmer still irritable delusional thought disordered. FS elevated but she is now beginning to resume compliance with metformin which has been effective in fast. Cont current meds for now.   12/25: Calm but remains delusional and thought disordered   12/26 Calmer but psychotic, poorly compliant Cont meds, eval need to consider court fro diabetic treatment over objection  12/27 Less severely agitated since last SHAFER dose.Cont to encourage compliance with PO meds especially given absence of viable IM alternatives but may need to consider TOO for diabetic meds if situation worsens  12/30. Very psychotic though less tenuous and agitated. Will taper off Seroquel to minimize contributions to poor diabetic control. Added Trajenta to metformin after discussion with medicine. Will hold off on court for DM meds to see how she does with above interventions  12/31 Patient very psychotic, aggressive. Cont current meds, consider increase Prolixin next shot or moving up dejon of injection. Consider alternates to Seroquel given sugars high and she is uncooperative with diabetic meds. Family informed of restraint  1/1: Remains intermittently agitated, aggressive, today disorganized but not aggressive, able to be redirected   1/2 Patient less agitated but paranoid, irritable, delusional about pregnancy, refusing most po meds. Reviewed with medicine, no clinical emergency will cont to encourage compliance. Note refusing Depakote but last EEG 9/30 neg for epileptiform activity Will d/c Seroquel as refusing and may increase glucose and will offer Abilify.   1/3 Pt has periods of irritability, refusing FS and vitals though seen active in day area, disorganized. Took dose of Abilify yesterday though nonadherent today.  1/4-1/5: Agitated and disorganized on 1/4 throwing things, pouring drink on her head, sitting in her urine and refusing to be cleaned, requires prn meds but better on 1/5 1/6 Patient refusing all po meds, having periods of agitation, cont to encourage compliance, consider moving up next dec and or increasing dose. Med refuses remain non emergent. Will consider TOO for whatever meds can be given parenterally  1/7 Patient grossly psychotic, no severe agitation/aggression. Refusing po meds. Feel risk benefit of giving IM antipsychotic is poor as the process results in extreme agitation/agression, need for restraints with only brief calming effect. Similar right now risk benefit of getting court order for diabetic meds is not favorable but can be re-evaluated  1/8 No major changes. Level of agitation irritability tend to be variable. Cont current meds w/o change  1/9 No major changes. Cont meds encourage compliance give dec 1/13   Plan:  -[facility] under [facility] legal status  -Routine checks  -Bed block  -Continue Depakote 1000mg BID  -Continue Seroquel 150 mg QHS  -Prolixin Dec 18.75 mg received on 11/6/24.  -Neuro workup at [facility] negative for seizure activity or acute stroke

## 2025-01-09 NOTE — BH INPATIENT PSYCHIATRY PROGRESS NOTE - NSBHMETABOLIC_PSY_ALL_CORE_FT
BMI: BMI (kg/m2): 29.7 (10-21-24 @ 16:58)  HbA1c: A1C with Estimated Average Glucose Result: 7.3 % (09-29-24 @ 17:15)    Glucose: POCT Blood Glucose.: 286 mg/dL (12-30-24 @ 11:56)    BP: 133/82 (01-08-25 @ 07:57) (133/82 - 133/82)Vital Signs Last 24 Hrs  T(C): --  T(F): --  HR: --  BP: --  BP(mean): --  RR: --  SpO2: --      Lipid Panel: Date/Time: 09-30-24 @ 04:45  Cholesterol, Serum: 148  LDL Cholesterol Calculated: 64  HDL Cholesterol, Serum: 56  Total Cholesterol/HDL Ration Measurement: --  Triglycerides, Serum: 138

## 2025-01-09 NOTE — BH INPATIENT PSYCHIATRY PROGRESS NOTE - NSBHFUPINTERVALHXFT_PSY_A_CORE
Patient seen for psychosis.  Refused vitals today refusing fs. Eating sleeping well, somewhat calmer . Took some meds last night. Provocative with some male staff. Attempted to throw food

## 2025-01-09 NOTE — PROGRESS NOTE ADULT - SUBJECTIVE AND OBJECTIVE BOX
CC/Reason for Consult:     SUBJECTIVE / OVERNIGHT EVENTS:  Seen by me this morning, in bathroom cleaning up her face at time of visit, has been refusing VS for the past 5-6 days. States that she does not get enough food here. Does not want to take metformin.  Refused physical exam and refused labs this AM.    MEDICATIONS  (STANDING):  cholecalciferol 1000 Unit(s) Oral daily  divalproex Sprinkle 1000 milliGRAM(s) Oral two times a day  glucagon  Injectable 1 milliGRAM(s) IntraMuscular once  insulin lispro (ADMELOG) corrective regimen sliding scale   SubCutaneous at bedtime  insulin lispro (ADMELOG) corrective regimen sliding scale   SubCutaneous three times a day before meals  levothyroxine 75 MICROGram(s) Oral daily  linagliptin 5 milliGRAM(s) Oral daily  melatonin. 3 milliGRAM(s) Oral at bedtime  metFORMIN 1000 milliGRAM(s) Oral two times a day with meals  metoprolol succinate ER 50 milliGRAM(s) Oral daily  multivitamin 2 Tablet(s) Oral at bedtime  QUEtiapine 200 milliGRAM(s) Oral at bedtime  senna 2 Tablet(s) Oral at bedtime    MEDICATIONS  (PRN):  acetaminophen     Tablet .. 650 milliGRAM(s) Oral every 6 hours PRN Mild Pain (1 - 3)  albuterol    90 MICROgram(s) HFA Inhaler 2 Puff(s) Inhalation every 6 hours PRN Bronchospasm  aluminum hydroxide/magnesium hydroxide/simethicone Suspension 30 milliLiter(s) Oral every 4 hours PRN Dyspepsia  bisacodyl 5 milliGRAM(s) Oral daily PRN Constipation  dextrose Oral Gel 15 Gram(s) Oral once PRN hypoglycemia  dextrose Oral Gel 15 Gram(s) Oral once PRN Blood Glucose LESS THAN 70 milliGRAM(s)/deciliter  diphenhydrAMINE Elixir 12.5 milliGRAM(s) Oral every 6 hours PRN eps prevention  diphenhydrAMINE Injectable 25 milliGRAM(s) IntraMuscular once PRN EPS prevention  fluPHENAZine 5 milliGRAM(s) Oral every 6 hours PRN agitation  fluPHENAZine  Injectable 5 milliGRAM(s) IntraMuscular once PRN agitation  polyethylene glycol 3350 17 Gram(s) Oral daily PRN constipation      Vital Signs Last 24 Hrs- Refusing since 12/25  T(C): --  T(F): --  HR: --  BP: --  BP(mean): --  RR: --  SpO2: --      CAPILLARY BLOOD GLUCOSE        PHYSICAL EXAM:- Refused to be examined  GENERAL: NAD, well-developed  HEAD:  Atraumatic, Normocephalic  EXTREMITIES: No edema  NEUROLOGY: non-focal    LABS:                    RADIOLOGY & ADDITIONAL TESTS:    Imaging Personally Reviewed:    Consultant(s) Notes Reviewed:      Care Discussed with Consultants/Other Providers:  
Podiatry pager #: [phone]    Patient is a 74y old  Female who presents with a chief complaint of psychosis (20 Dec 2024 13:11)Podiatry consultation for evaluation of thickened ingrown toenails of both feet aggravated by shoe gear in relation palpation.      HPI:      PAST MEDICAL & SURGICAL HISTORY:  Parkinson disease      Seizure disorder      Hypothyroidism      Type II diabetes mellitus      Hyperlipidemia      Schizophrenia      Hemorrhoids      Hypertension      No significant past surgical history          MEDICATIONS  (STANDING):  ARIPiprazole 5 milliGRAM(s) Oral daily  cholecalciferol 1000 Unit(s) Oral daily  divalproex Sprinkle 1000 milliGRAM(s) Oral two times a day  glucagon  Injectable 1 milliGRAM(s) IntraMuscular once  insulin lispro (ADMELOG) corrective regimen sliding scale   SubCutaneous at bedtime  insulin lispro (ADMELOG) corrective regimen sliding scale   SubCutaneous three times a day before meals  levothyroxine 75 MICROGram(s) Oral daily  linagliptin 5 milliGRAM(s) Oral daily  melatonin. 3 milliGRAM(s) Oral at bedtime  metFORMIN 1000 milliGRAM(s) Oral two times a day with meals  metoprolol succinate ER 50 milliGRAM(s) Oral daily  multivitamin 2 Tablet(s) Oral at bedtime  senna 2 Tablet(s) Oral at bedtime    MEDICATIONS  (PRN):  acetaminophen     Tablet .. 650 milliGRAM(s) Oral every 6 hours PRN Mild Pain (1 - 3)  albuterol    90 MICROgram(s) HFA Inhaler 2 Puff(s) Inhalation every 6 hours PRN Bronchospasm  aluminum hydroxide/magnesium hydroxide/simethicone Suspension 30 milliLiter(s) Oral every 4 hours PRN Dyspepsia  bisacodyl 5 milliGRAM(s) Oral daily PRN Constipation  dextrose Oral Gel 15 Gram(s) Oral once PRN hypoglycemia  dextrose Oral Gel 15 Gram(s) Oral once PRN Blood Glucose LESS THAN 70 milliGRAM(s)/deciliter  diphenhydrAMINE Elixir 12.5 milliGRAM(s) Oral every 6 hours PRN eps prevention  diphenhydrAMINE Injectable 25 milliGRAM(s) IntraMuscular once PRN EPS prevention  fluPHENAZine 5 milliGRAM(s) Oral every 6 hours PRN agitation  fluPHENAZine  Injectable 5 milliGRAM(s) IntraMuscular once PRN agitation  polyethylene glycol 3350 17 Gram(s) Oral daily PRN constipation      Allergies    penicillin (Hives; Rash)  Preston (Unknown)  lisinopril (Angioedema)    Intolerances        VITALS:    Vital Signs Last 24 Hrs  T(C): --  T(F): --  HR: --  BP: --  BP(mean): --  RR: --  SpO2: --        LABS:                CAPILLARY BLOOD GLUCOSE              LOWER EXTREMITY PHYSICAL EXAM:    Vasular: DP/PT 1_/4, B/L, CFT <2_ seconds B/L, Temperature gradient wnl_, B/L.   Neuro: Epicritic sensation diminished_ to the level of toes_, B/L.  Skin: Positive dystrophic hypertrophic brittle toenails with subungual debris to all 10 digits.  No open ulcerations or clinical signs of infection.      RADIOLOGY & ADDITIONAL STUDIES:    
CC/Reason for Consult: DM    SUBJECTIVE / OVERNIGHT EVENTS:  Patient has developed new paranoia about metformin and refuses to take it.  She also refuses insulin.      MEDICATIONS  (STANDING):  cholecalciferol 1000 Unit(s) Oral daily  divalproex Sprinkle 1000 milliGRAM(s) Oral two times a day  glucagon  Injectable 1 milliGRAM(s) IntraMuscular once  insulin lispro (ADMELOG) corrective regimen sliding scale   SubCutaneous three times a day before meals  insulin lispro (ADMELOG) corrective regimen sliding scale   SubCutaneous at bedtime  levothyroxine 75 MICROGram(s) Oral daily  linagliptin 5 milliGRAM(s) Oral daily  melatonin. 3 milliGRAM(s) Oral at bedtime  metoprolol succinate ER 50 milliGRAM(s) Oral daily  multivitamin 1 Tablet(s) Oral at bedtime  QUEtiapine 200 milliGRAM(s) Oral at bedtime  senna 2 Tablet(s) Oral at bedtime    MEDICATIONS  (PRN):  acetaminophen     Tablet .. 650 milliGRAM(s) Oral every 6 hours PRN Mild Pain (1 - 3)  albuterol    90 MICROgram(s) HFA Inhaler 2 Puff(s) Inhalation every 6 hours PRN Bronchospasm  aluminum hydroxide/magnesium hydroxide/simethicone Suspension 30 milliLiter(s) Oral every 4 hours PRN Dyspepsia  bisacodyl 5 milliGRAM(s) Oral daily PRN Constipation  dextrose Oral Gel 15 Gram(s) Oral once PRN Blood Glucose LESS THAN 70 milliGRAM(s)/deciliter  dextrose Oral Gel 15 Gram(s) Oral once PRN hypoglycemia  diphenhydrAMINE Elixir 12.5 milliGRAM(s) Oral every 6 hours PRN eps prevention  diphenhydrAMINE Injectable 12.5 milliGRAM(s) IntraMuscular once PRN EPS prevention  fluPHENAZine 5 milliGRAM(s) Oral every 6 hours PRN agitation  fluPHENAZine  Injectable 5 milliGRAM(s) IntraMuscular once PRN agitation  polyethylene glycol 3350 17 Gram(s) Oral daily PRN constipation            CAPILLARY BLOOD GLUCOSE      POCT Blood Glucose.: 342 mg/dL (19 Dec 2024 20:40)    refused VS since 12/12    PHYSICAL EXAM:  GENERAL: NAD  HEAD:  Atraumatic, Normocephalic  EYES: EOMI, conjunctiva and sclera clear  NECK: Supple, No JVD  CHEST/LUNG: Clear to auscultation bilaterally; No wheeze  HEART: Regular rate and rhythm; No murmurs, rubs, or gallops  ABDOMEN: Soft, Nontender, Nondistended; Bowel sounds present  EXTREMITIES:  2+ Peripheral Pulses, No clubbing, cyanosis, or edema  NEUROLOGY: non-focal  SKIN: No rashes or lesions    LABS:      reviewed in [facility]          Care Discussed with Consultants/Other Providers: Dr Flower

## 2025-01-10 PROCEDURE — 99232 SBSQ HOSP IP/OBS MODERATE 35: CPT

## 2025-01-10 RX ORDER — DIPHENHYDRAMINE HCL 25 MG
25 CAPSULE ORAL ONCE
Refills: 0 | Status: COMPLETED | OUTPATIENT
Start: 2025-01-10 | End: 2025-01-10

## 2025-01-10 RX ORDER — FLUPHENAZINE HCL 5 MG
5 TABLET ORAL ONCE
Refills: 0 | Status: COMPLETED | OUTPATIENT
Start: 2025-01-10 | End: 2025-01-10

## 2025-01-10 RX ADMIN — Medication 25 MILLIGRAM(S): at 13:24

## 2025-01-10 RX ADMIN — Medication 5 MILLIGRAM(S): at 13:24

## 2025-01-10 NOTE — BH INPATIENT PSYCHIATRY PROGRESS NOTE - NSBHFUPINTERVALHXFT_PSY_A_CORE
Patient seen for psychosis. Refused vitals and FS. Was agitated today, had thrown items and soup over staff, aggressive, threatening. Patient was medicated with Prolixin 5mg and Benadryl 25mg IM and placed in restraints for agitation. Took depakote and multivitamin last night, however had refused medications this morning.

## 2025-01-10 NOTE — BH INPATIENT PSYCHIATRY PROGRESS NOTE - NSBHASSESSSUMMFT_PSY_ALL_CORE
Ms Azul is a 72 yo F w history of SCZ, Parkinson's dz, hypothyroidism, and multiple psych hospitalizations who presents for AMS. Pt transferred from [facility], where she was found to have fallen and was covered in urine. Pt has been hospitalized in [facility] with occasional stays at [facility] for medical concerns since the start of this year for psychosis. Per [facility] notes, pt appears to be awaiting transfer to [facility] hospital. Pt has been delusional at [facility] with recent delusions surrounding pregnancy. Current medications are Depakote 750 mg bid, Seroquel 50 mg bid, and Prolixin Dec 12.5 mg IM q2w (last dose 9/24).     In [facility] stroke code was negative for acute pathology. Pt refused MRI and neuro deemed it not necessary. EEG did not reveal seizure activity and toxic encephalopathy workup was also negative. Psychiatry continued Depakote 1000mg BID, Seroquel 75mg QHS and Prolixin Dec 12.5 mg IM q2w (last dose given on 10/9), next dose due 10/23/24. As per psych CL pt has been irritable, aggressive at times requiring PRN medications and restraints. Paranoid and disorganized.     On arrival to  pt continues to present paranoid delusions, mentions she was poisoned by nurses at [facility], continues to endorse delusions of pregnancy. Denies SI/HI. Requires inpatient level of care due to inability to care for herself or transition to outpatient level of care given severe episodes of agitation, aggression and disorganized behavior in the context of psychosis.    10/22 Clinical Update: Ms. Azul refused to have her vital signs taken this morning. No behavioral outbursts or aggression reported. Bloodwork reviewed and WNL. Valproic acid level within therapeutic range at 65.70. Pt is visible in the dayroom today. Seems sedated, dozing off during interview. May need Seroquel dose to be adjusted to minimize daytime sedation. Due for Prolixin dec tomorrow.    10/23: Refused VS, irritable on approach, will offer Prolixin Dec tomorrow if more agreeable.  Did take PO meds.    10/24: Episodic agitation, did require IM prn last night.  Refused SHAFER and declined AM meds.  10/25: Remains irritable, refusing meds and VS, declines SHAFER, will likely require MOO.  10/28 Patient psychotic tenuous will increase Seroquel request transfer hearing start TOO, change prn to Prolixin Benadryl as never has had much response to olanzapine  10/29 Somewhat calmer today possibly from prn and taking Seroquel last night but refused BP meds, and Depakote which may affect medical condition encourage compliance placed MOO paperwork  10/30 Selectively compliant with medications. Remains illogical, paranoid, uncooperative with care  10/31 Psychotic disorganized needing prns taking medical meds part of time. Cont meds plan TOO hearing  11/1 Calmer at moment but quite psychotic, compliance with medical and psych meds very erratic Cont meds and plan for TOO hearing  11/2: Noncompliant with medications, uncooperative with unit rules and ADL care.   11/3: Remains paranoid  11/4 Agitated tenuous poorly compliant will go to court for TOO, will move Seroquel to all PM at her request with hope this may improve compliance  11/5 Somewhat calmer today no prns, very psychotic, erratic compliance. Will offer decanoate today  she is due, plan court for TOO in AM.  11/6 Patient psychotic, not much change will give Prolixin  18.75mg today, Cont to titrate Seroquel but only hs at her request.   11/7 Patient irritable, aggressive delusional. Cont decanoate along with increasing Seroquel and using prns , await state transfer  11/8 Patient irritable more negativistic today do not feel though she has catatonic mutism as she is talking to other staff and peers other than writer. Cont to titrate Seroquel if she is taking regularly. If she refuses Depakote regularly may need to eval for transfer for IV anti convulsants.  11/9 guarded, easily irritable but more visible, no acute events overnight/this am, partially compliant with medical meds. No SI/HI reported.   11/12 guarded, uncooperative, disorganized and irritable, partially cooperative with meds, refuses vitals.   11/13 needed PRN IM and restraints for agitation, physical aggression, calmer later on. Remains delusional, disorganized and labile, partially compliant with medications.  11/14 no overnight events, no PRN needed, refused am meds, complied with hs seroquel. Remains guarded, uncooperative and dismissive, no suicidal/homicidal behavior.   11/15 no acute outbursts, no PRN needed. She does not think seroquel is helpful, only partially compliant with medications, no SI/HI. She spoke to  about abilify, to review her past medications trials.  11/18 Cont current meds including Shafer when due will re-eval Seroquel though other options limited will get ua and if patient will allow will add prn laxatives  11/19 Psychotic not much change level of agitation fluctuates while she regularly expresses paranoia and delusions of pregnancy. Cont current meds encourage compliant encourage allowing assist with ADL's. UA and C and S re-ordered  11/20 Not much change cont resistive to help with hygiene, delusional, irritable. Expressing willingness to try Abilify to [facility], will explore with patient  11/21 Patient calmer today possibly related to prn. Patient expressing willingness to try Abilify but based on past she rarely follows through. Cont current meds for now. U/A neg  no evidence UTI Incontinence likely mix of anatomical and behavioral factors  11/22: More subdued possibly some incremental benefit from regular dosing of decanoate. Will ocntinue will cont Seroquel rather than make changes when patient showing some gains, Will sl increase Seroquel as she appeared in past to benefit from 300mg/d  11/25 Modest gains overall with ongoing epsiodic agitation. Increase Seroquel give next dec of Prolixin at week 3. Ordered podiatry clinic.   11/26 Some gains with administrsation of dec next due 11/29. Plan further titration Seroquel  11/27 Modst gains. Next dec 11/29, plan further titration Seroquel  11/29 Remains with refractory illness. Cont meds, for dec today, plan further titration of Seroquel  12/2 Patient without much change, psychotic, irritable, tenuous at times. Cont treatment consider increasing Seroquel. Will ask PT to re-eval walker generally viewed as not appropriate as she may use it in unsafe way.  12/3 Patient calmer with SHAFER. Cont current meds consider increasing Seroquel. ordered dental consult. Will consider speech consult with hope advancing diet  12/4 Patient w/o much changed, scheduled for dental appointment. Not considered safe to give walker due to risk of aggression Will get labs in AM on routine   basis  12/5 Patient with some gains. Will increase Seroquel will have her seen in dental tomorrow. reviewed plan of care with sister  12/6 Patient improved modestly cont current meds plan to titrate Seroquel consider making Prolixin 25mg monthly to decrease number of injections can return to dental if agrees to extraction or any deterioration occurs  12/9 Patient partially improved cont current meds will increase dose of dec next shot to see if can give q4 weeks  12/10 Modest gains. Cont meds consider increasing Seroquel. Ordered cine to see if feasible to advance diet  12/11 Patient agitated and revealing new delusions. Plan cont to titrate Seroquel and Prolixin. will hold off on rescheduling cine to avoid inadvertently provoking her. Will lower melatonin as she blames this for sedating her leading t missed appointment  12/12 Paranoid, somatic cont current meds.  12/13 Labile, with periods of agitation. Continue current medications   12/16 Not much change cont current meds will increase fluphenazine next shot. Patient refusing to go for cine  12/17 No major changes patient refusing Depakote about half the time, if continues will discuss with neuro what options are available  12/18 Possibly more psychotic, will increase fluphenazine dec next dose.   12/19 More delusional plan increase next dec dose. Will not try at this time givwe acute IM as per court order as leads to increased agitation more than clear benefit. MAy need to return to court for order to give Insulin per court order  12/20 Irritable, angry with receiving SHAFER. Have increase Prolixin to 25mg. Will change metformin Vladislav alfonso hope she will accept a diffenent DM med that is only once a day otherwise may need to pursue TOO  12/23 Calmer since last Shafer dose still delusional. Cont curren t meds except she is willing to go back to metformin will restart and dc Trajenta  12/24 Patient calmer still irritable delusional thought disordered. FS elevated but she is now beginning to resume compliance with metformin which has been effective in fast. Cont current meds for now.   12/25: Calm but remains delusional and thought disordered   12/26 Calmer but psychotic, poorly compliant Cont meds, eval need to consider court fro diabetic treatment over objection  12/27 Less severely agitated since last SHAFER dose.Cont to encourage compliance with PO meds especially given absence of viable IM alternatives but may need to consider TOO for diabetic meds if situation worsens  12/30. Very psychotic though less tenuous and agitated. Will taper off Seroquel to minimize contributions to poor diabetic control. Added Trajenta to metformin after discussion with medicine. Will hold off on court for DM meds to see how she does with above interventions  12/31 Patient very psychotic, aggressive. Cont current meds, consider increase Prolixin next shot or moving up dejon of injection. Consider alternates to Seroquel given sugars high and she is uncooperative with diabetic meds. Family informed of restraint  1/1: Remains intermittently agitated, aggressive, today disorganized but not aggressive, able to be redirected   1/2 Patient less agitated but paranoid, irritable, delusional about pregnancy, refusing most po meds. Reviewed with medicine, no clinical emergency will cont to encourage compliance. Note refusing Depakote but last EEG 9/30 neg for epileptiform activity Will d/c Seroquel as refusing and may increase glucose and will offer Abilify.   1/3 Pt has periods of irritability, refusing FS and vitals though seen active in day area, disorganized. Took dose of Abilify yesterday though nonadherent today.  1/4-1/5: Agitated and disorganized on 1/4 throwing things, pouring drink on her head, sitting in her urine and refusing to be cleaned, requires prn meds but better on 1/5 1/6 Patient refusing all po meds, having periods of agitation, cont to encourage compliance, consider moving up next dec and or increasing dose. Med refuses remain non emergent. Will consider TOO for whatever meds can be given parenterally  1/7 Patient grossly psychotic, no severe agitation/aggression. Refusing po meds. Feel risk benefit of giving IM antipsychotic is poor as the process results in extreme agitation/agression, need for restraints with only brief calming effect. Similar right now risk benefit of getting court order for diabetic meds is not favorable but can be re-evaluated  1/8 No major changes. Level of agitation irritability tend to be variable. Cont current meds w/o change  1/9 No major changes. Cont meds encourage compliance give dec 1/13   1/10 Agitated, grossly psychotic, unable to be redirected, threatening staff and throwing soup bowl at staff. Received IM meds with restraints.     Plan:  -[facility] 2S under C legal status  -Routine checks  -Bed block  -Continue Depakote 1000mg BID  -Continue Seroquel 150 mg QHS  -Prolixin Dec 18.75 mg received on 11/6/24.  -Neuro workup at [facility] negative for seizure activity or acute stroke

## 2025-01-10 NOTE — BH INPATIENT PSYCHIATRY PROGRESS NOTE - NSBHCHARTREVIEWVS_PSY_A_CORE FT
Vital Signs Last 24 Hrs  T(C): --  T(F): --  HR: --  BP: --  BP(mean): --  RR: --  SpO2: --     Infliximab Counseling:  I discussed with the patient the risks of infliximab including but not limited to myelosuppression, immunosuppression, autoimmune hepatitis, demyelinating diseases, lymphoma, and serious infections.  The patient understands that monitoring is required including a PPD at baseline and must alert us or the primary physician if symptoms of infection or other concerning signs are noted.

## 2025-01-11 PROCEDURE — 99232 SBSQ HOSP IP/OBS MODERATE 35: CPT

## 2025-01-11 RX ADMIN — Medication 1000 MILLIGRAM(S): at 20:10

## 2025-01-11 NOTE — BH INPATIENT PSYCHIATRY PROGRESS NOTE - NSBHMETABOLIC_PSY_ALL_CORE_FT
Patient ID: Leilani Rosario is a 63 y o  female  Assessment/Plan:    Tremor  Patient with improvement of her tremors after switching her metoprolol to propranolol  Her tremors had been very well controlled until her recent Covid infection  She was also recently noted to have high blood pressure at her previous visit in our office  Because of this I have asked her to start monitoring her blood pressure at home once a day for the next week  If these numbers remain elevated and her tremors continue to be an issue then I would certainly suggest speaking to her PCP about increasing her propanolol dose further  If however her blood pressure is well controlled at home but her tremors continue we could also still consider increasing the propanolol dose further given this was beneficial for her when started  For right now she will remain on her current propanolol dose  She also does continue to take topiramate for her migraines  Subjective:    June Hew is a 63year old female with migraines who presents for evaluation tremors   She follows with the headache team for migraine control and it appears that it 11/2020 she began to complain of some tremors and was referred for further evaluation with the movement center  She had a brain MRI in 2017 which was normal      To review, she had her flu vaccination in October 2019 and felt that following that she developed an internal tremor  Describes it as a sensation of internal shaking but at times has noted that her arms and whole body visibly shake as well   She feels that she always has a "fine tremor" in the hands   At times she will notice it more than others   She states that she has always had some hand tremors for as long as she can remember  She was given hydroxyzine 25mg for anxiety by her PCP and per pt it did not help  She was also switched from Depakote to Lamictal with no improvement of tremors        At her last visit it was felt that her tremors were either ET or enhanced physiological tremors   She was to discuss switching her metoprolol to propranolol with her PCP  INTERVAL HISTORY:  She was started on Propranolol by her PCP with improvement of her tremors   She felt that she was overall doing well until she had Covid 8 days ago  Since that time she feels that her tremors have been worse  When tremors are present she feels an internal tremors  When tremors are present in the hands she struggles with putting on makeup  No issues with eating and drinking   She continues to work as a  and has not had as much issues with the tremors at work recently   No other concerns at this time   Her pressure was elevated at her recent visit with [facility], she does have a BP cuff at home but has not been checking regularly        I personally reviewed and updated the ROS  Objective:    Blood pressure 140/80, pulse 81, temperature (!) 96 5 °F (35 8 °C), temperature source Temporal, height 5' 4" (1 626 m), weight 66 2 kg (146 lb)  Physical Exam  Constitutional:       Appearance: Normal appearance  HENT:      Right Ear: Hearing normal       Left Ear: Hearing normal    Eyes:      General: Lids are normal       Extraocular Movements: Extraocular movements intact  Pupils: Pupils are equal, round, and reactive to light  Pulmonary:      Effort: Pulmonary effort is normal    Neurological:      Mental Status: She is alert  Deep Tendon Reflexes: Strength normal    Psychiatric:         Speech: Speech normal          Neurological Exam  Mental Status  Alert  Oriented to person, place and time  Speech is normal     Cranial Nerves  CN III, IV, VI: Extraocular movements intact bilaterally  Normal lids and orbits bilaterally  Pupils equal round and reactive to light bilaterally  CN V:  Right: Facial sensation is normal   Left: Facial sensation is normal on the left    CN VIII:  Right: Hearing is normal   Left: Hearing is normal   CN XI: Shoulder shrug strength is normal     Motor   Strength is 5/5 throughout all four extremities  Sensory  Light touch is normal in upper and lower extremities  Coordination  Very subtle fine postural tremors noted in both hands  Mild action tremors with finger-to-nose testing bilaterally  No resting tremor noted  No leg tremor noted  No bradykinesia with finger taps, hand , DEBBI, heel or toe taps     No rigidity  Handwriting normal, no tremor or micrographia noted  Very slight tremulous spirals bilaterally       Gait    Arose without difficulty  Normal stride length, no shuffling or freezing noted  Overall normal arm swing           ROS:    Review of Systems   Constitutional: Negative  Negative for appetite change and fever  HENT: Negative  Negative for hearing loss, tinnitus, trouble swallowing and voice change  Eyes: Negative  Negative for photophobia and pain  Respiratory: Negative  Negative for shortness of breath  Cardiovascular: Negative  Negative for palpitations  Gastrointestinal: Negative  Negative for nausea and vomiting  Endocrine: Negative  Negative for cold intolerance  Genitourinary: Negative  Negative for dysuria, frequency and urgency  Musculoskeletal: Negative  Negative for myalgias and neck pain  Skin: Negative  Negative for rash  Neurological: Positive for tremors (progression in b/l hand after Covid-19 exposure)  Negative for dizziness, seizures, syncope, facial asymmetry, speech difficulty, weakness, light-headedness, numbness and headaches  Hematological: Negative  Does not bruise/bleed easily  Psychiatric/Behavioral: Negative  Negative for confusion, hallucinations and sleep disturbance  BMI: BMI (kg/m2): 29.7 (10-21-24 @ 16:58)  HbA1c: A1C with Estimated Average Glucose Result: 7.3 % (09-29-24 @ 17:15)    Glucose: POCT Blood Glucose.: 286 mg/dL (12-30-24 @ 11:56)    BP: --Vital Signs Last 24 Hrs  T(C): --  T(F): --  HR: --  BP: --  BP(mean): --  RR: --  SpO2: --      Lipid Panel: Date/Time: 09-30-24 @ 04:45  Cholesterol, Serum: 148  LDL Cholesterol Calculated: 64  HDL Cholesterol, Serum: 56  Total Cholesterol/HDL Ration Measurement: --  Triglycerides, Serum: 138
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
## 2025-01-11 NOTE — BH INPATIENT PSYCHIATRY PROGRESS NOTE - NSBHFUPINTERVALHXFT_PSY_A_CORE
Patient is seen for psychosis and aggression.  Patient is discussed with nursing team. Chart and medications reviewed. Patient is seen in her room, she is asleep and in NAD.  Per staff report patient has been disruptive, labile and hostile in past recent days, requiring IM prn yesterday. Patient on/off compliance  with medications. Patient was irritable this morning and unprovoked  threw water at staff this morning. Presents disorganized, emotionally labile.   No acute medical concerns. Has been refusing vitals, labs and fingerstick. Continue to monitor and provide therapeutic support.

## 2025-01-12 RX ADMIN — Medication 2 TABLET(S): at 21:33

## 2025-01-12 RX ADMIN — ARIPIPRAZOLE 5 MILLIGRAM(S): 5 TABLET ORAL at 08:15

## 2025-01-12 RX ADMIN — Medication 5 MILLIGRAM(S): at 21:32

## 2025-01-12 RX ADMIN — Medication 1000 MILLIGRAM(S): at 21:33

## 2025-01-12 RX ADMIN — ACETAMINOPHEN, DIPHENHYDRAMINE HCL, PHENYLEPHRINE HCL 3 MILLIGRAM(S): 325; 25; 5 TABLET ORAL at 21:32

## 2025-01-13 PROCEDURE — 99232 SBSQ HOSP IP/OBS MODERATE 35: CPT

## 2025-01-13 RX ADMIN — Medication 25 MILLIGRAM(S): at 10:00

## 2025-01-13 RX ADMIN — LEVOTHYROXINE SODIUM 75 MICROGRAM(S): 25 TABLET ORAL at 06:40
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
## 2025-01-13 NOTE — BH INPATIENT PSYCHIATRY PROGRESS NOTE - CURRENT MEDICATION
MEDICATIONS  (STANDING):  ARIPiprazole 5 milliGRAM(s) Oral daily  cholecalciferol 1000 Unit(s) Oral daily  divalproex Sprinkle 1000 milliGRAM(s) Oral two times a day  glucagon  Injectable 1 milliGRAM(s) IntraMuscular once  insulin lispro (ADMELOG) corrective regimen sliding scale   SubCutaneous at bedtime  insulin lispro (ADMELOG) corrective regimen sliding scale   SubCutaneous three times a day before meals  levothyroxine 75 MICROGram(s) Oral daily  linagliptin 5 milliGRAM(s) Oral daily  melatonin. 3 milliGRAM(s) Oral at bedtime  metFORMIN 1000 milliGRAM(s) Oral two times a day with meals  metoprolol succinate ER 50 milliGRAM(s) Oral daily  multivitamin 2 Tablet(s) Oral at bedtime  senna 2 Tablet(s) Oral at bedtime    MEDICATIONS  (PRN):  acetaminophen     Tablet .. 650 milliGRAM(s) Oral every 6 hours PRN Mild Pain (1 - 3)  albuterol    90 MICROgram(s) HFA Inhaler 2 Puff(s) Inhalation every 6 hours PRN Bronchospasm  aluminum hydroxide/magnesium hydroxide/simethicone Suspension 30 milliLiter(s) Oral every 4 hours PRN Dyspepsia  bisacodyl 5 milliGRAM(s) Oral daily PRN Constipation  dextrose Oral Gel 15 Gram(s) Oral once PRN hypoglycemia  dextrose Oral Gel 15 Gram(s) Oral once PRN Blood Glucose LESS THAN 70 milliGRAM(s)/deciliter  diphenhydrAMINE Elixir 12.5 milliGRAM(s) Oral every 6 hours PRN eps prevention  diphenhydrAMINE Injectable 25 milliGRAM(s) IntraMuscular once PRN EPS prevention  fluPHENAZine 5 milliGRAM(s) Oral every 6 hours PRN agitation  fluPHENAZine  Injectable 5 milliGRAM(s) IntraMuscular once PRN agitation  polyethylene glycol 3350 17 Gram(s) Oral daily PRN constipation   MEDICATIONS  (STANDING):  ARIPiprazole 5 milliGRAM(s) Oral daily  cholecalciferol 1000 Unit(s) Oral daily  divalproex Sprinkle 1000 milliGRAM(s) Oral two times a day  glucagon  Injectable 1 milliGRAM(s) IntraMuscular once  insulin lispro (ADMELOG) corrective regimen sliding scale   SubCutaneous three times a day before meals  insulin lispro (ADMELOG) corrective regimen sliding scale   SubCutaneous at bedtime  levothyroxine 75 MICROGram(s) Oral daily  linagliptin 5 milliGRAM(s) Oral daily  melatonin. 3 milliGRAM(s) Oral at bedtime  metFORMIN 1000 milliGRAM(s) Oral two times a day with meals  metoprolol succinate ER 50 milliGRAM(s) Oral daily  multivitamin 2 Tablet(s) Oral at bedtime  senna 2 Tablet(s) Oral at bedtime    MEDICATIONS  (PRN):  acetaminophen     Tablet .. 650 milliGRAM(s) Oral every 6 hours PRN Mild Pain (1 - 3)  albuterol    90 MICROgram(s) HFA Inhaler 2 Puff(s) Inhalation every 6 hours PRN Bronchospasm  aluminum hydroxide/magnesium hydroxide/simethicone Suspension 30 milliLiter(s) Oral every 4 hours PRN Dyspepsia  bisacodyl 5 milliGRAM(s) Oral daily PRN Constipation  dextrose Oral Gel 15 Gram(s) Oral once PRN hypoglycemia  dextrose Oral Gel 15 Gram(s) Oral once PRN Blood Glucose LESS THAN 70 milliGRAM(s)/deciliter  diphenhydrAMINE Elixir 12.5 milliGRAM(s) Oral every 6 hours PRN eps prevention  diphenhydrAMINE Injectable 25 milliGRAM(s) IntraMuscular once PRN EPS prevention  fluPHENAZine 5 milliGRAM(s) Oral every 6 hours PRN agitation  fluPHENAZine  Injectable 5 milliGRAM(s) IntraMuscular once PRN agitation  polyethylene glycol 3350 17 Gram(s) Oral daily PRN constipation

## 2025-01-13 NOTE — BH INPATIENT PSYCHIATRY PROGRESS NOTE - [id number]_PSY_A_CORE
Patient seen for psychosis.  Refused vitals today refusing fs. Eating sleeping well, somewhat calmer . Took some meds last night. Provocative with some male staff. Attempted to throw food Patient is seen and evaluated for follow up for psychosis.  Chart is reviewed and case is discussed with nursing team.  No issues overnight.  Refused vitals today refusing fs. Eating sleeping well, somewhat calmer. Provocative at times, refused PO meds but did receive Prolixin Decanoate 25mg SHAFER this morning with some irritability noted, but after improved, did take a shower.  No somatic complaints and less agitation noted.

## 2025-01-14 PROCEDURE — 99232 SBSQ HOSP IP/OBS MODERATE 35: CPT

## 2025-01-14 NOTE — BH INPATIENT PSYCHIATRY PROGRESS NOTE - NSBHASSESSSUMMFT_PSY_ALL_CORE
Ms Azul is a 74 yo F w history of SCZ, Parkinson's dz, hypothyroidism, and multiple psych hospitalizations who presents for AMS. Pt transferred from [facility], where she was found to have fallen and was covered in urine. Pt has been hospitalized in [facility] with occasional stays at [facility] for medical concerns since the start of this year for psychosis. Per [facility] notes, [facility] to [facility] Pt has been delusional at [facility] with recent delusions surrounding pregnancy. Current medications are Depakote 750 mg bid, Seroquel 50 mg bid, and Prolixin Dec 12.5 mg IM q2w (last dose 9/24).     In [facility] stroke code was negative for acute pathology. Pt refused MRI and neuro deemed it not necessary. EEG did not reveal seizure activity and toxic encephalopathy workup was also negative. Psychiatry continued Depakote 1000mg BID, Seroquel 75mg QHS and Prolixin Dec 12.5 mg IM q2w (last dose given on 10/9), next dose due 10/23/24. As per psych CL pt has been irritable, aggressive at times requiring PRN medications and restraints. Paranoid and disorganized.     On arrival to  pt continues to present paranoid delusions, mentions she was poisoned by nurses at [facility], continues to endorse delusions of pregnancy. Denies SI/HI. Requires inpatient level of care due to inability to care for herself or transition to outpatient level of care given severe episodes of agitation, aggression and disorganized behavior in the context of psychosis.    10/22 Clinical Update: Ms. Azul refused to have her vital signs taken this morning. No behavioral outbursts or aggression reported. Bloodwork reviewed and WNL. Valproic acid level within therapeutic range at 65.70. Pt is visible in the dayroom today. Seems sedated, dozing off during interview. May need Seroquel dose to be adjusted to minimize daytime sedation. Due for Prolixin dec tomorrow.    10/23: Refused VS, irritable on approach, will offer Prolixin Dec tomorrow if more agreeable.  Did take PO meds.    10/24: Episodic agitation, did require IM prn last night.  Refused SHAFER and declined AM meds.  10/25: Remains irritable, refusing meds and VS, declines SHAFER, will likely require MOO.  10/28 Patient psychotic tenuous will increase Seroquel request transfer hearing start TOO, change prn to Prolixin Benadryl as never has had much response to olanzapine  10/29 Somewhat calmer today possibly from prn and taking Seroquel last night but refused BP meds, and Depakote which may affect medical condition encourage compliance placed MOO paperwork  10/30 Selectively compliant with medications. Remains illogical, paranoid, uncooperative with care  10/31 Psychotic disorganized needing prns taking medical meds part of time. Cont meds plan TOO hearing  11/1 Calmer at moment but quite psychotic, compliance with medical and psych meds very erratic Cont meds and plan for TOO hearing  11/2: Noncompliant with medications, uncooperative with unit rules and ADL care.   11/3: Remains paranoid  11/4 Agitated tenuous poorly compliant will go to court for TOO, will move Seroquel to all PM at her request with hope this may improve compliance  11/5 Somewhat calmer today no prns, very psychotic, erratic compliance. Will offer decanoate today  she is due, plan court for TOO in AM.  11/6 Patient psychotic, not much change will give Prolixin  18.75mg today, Cont to titrate Seroquel but only hs at her request.   11/7 Patient irritable, aggressive delusional. Cont decanoate along with increasing Seroquel and using prns , await state transfer  11/8 Patient irritable more negativistic today do not feel though she has catatonic mutism as she is talking to other staff and peers other than writer. Cont to titrate Seroquel if she is taking regularly. If she refuses Depakote regularly may need to eval for transfer for IV anti convulsants.  11/9 guarded, easily irritable but more visible, no acute events overnight/this am, partially compliant with medical meds. No SI/HI reported.   11/12 guarded, uncooperative, disorganized and irritable, partially cooperative with meds, refuses vitals.   11/13 needed PRN IM and restraints for agitation, physical aggression, calmer later on. Remains delusional, disorganized and labile, partially compliant with medications.  11/14 no overnight events, no PRN needed, refused am meds, complied with hs seroquel. Remains guarded, uncooperative and dismissive, no suicidal/homicidal behavior.   11/15 no acute outbursts, no PRN needed. She does not think seroquel is helpful, only partially compliant with medications, no SI/HI. She spoke to  about abilify, to review her past medications trials.  11/18 Cont current meds including Shafer when due will re-eval Seroquel though other options limited will get ua and if patient will allow will add prn laxatives  11/19 Psychotic not much change level of agitation fluctuates while she regularly expresses paranoia and delusions of pregnancy. Cont current meds encourage compliant encourage allowing assist with ADL's. UA and C and S re-ordered  11/20 Not much change cont resistive to help with hygiene, delusional, irritable. Expressing willingness to try Abilify to [facility], will explore with patient  11/21 Patient calmer today possibly related to prn. Patient expressing willingness to try Abilify but based on past she rarely follows through. Cont current meds for now. U/A neg  no evidence UTI Incontinence likely mix of anatomical and behavioral factors  11/22: More subdued possibly some incremental benefit from regular dosing of decanoate. Will ocntinue will cont Seroquel rather than make changes when patient showing some gains, Will sl increase Seroquel as she appeared in past to benefit from 300mg/d  11/25 Modest gains overall with ongoing epsiodic agitation. Increase Seroquel give next dec of Prolixin at week 3. Ordered podiatry clinic.   11/26 Some gains with administrsation of dec next due 11/29. Plan further titration Seroquel  11/27 Modst gains. Next dec 11/29, plan further titration Seroquel  11/29 Remains with refractory illness. Cont meds, for dec today, plan further titration of Seroquel  12/2 Patient without much change, psychotic, irritable, tenuous at times. Cont treatment consider increasing Seroquel. Will ask PT to re-eval walker generally viewed as not appropriate as she may use it in unsafe way.  12/3 Patient calmer with SHAFER. Cont current meds consider increasing Seroquel. ordered dental consult. Will consider speech consult with hope advancing diet  12/4 Patient w/o much changed, scheduled for dental appointment. Not considered safe to give walker due to risk of aggression Will get labs in AM on routine   basis  12/5 Patient with some gains. Will increase Seroquel will have her seen in dental tomorrow. reviewed plan of care with sister  12/6 Patient improved modestly cont current meds plan to titrate Seroquel consider making Prolixin 25mg monthly to decrease number of injections can return to dental if agrees to extraction or any deterioration occurs  12/9 Patient partially improved cont current meds will increase dose of dec next shot to see if can give q4 weeks  12/10 Modest gains. Cont meds consider increasing Seroquel. Ordered cine to see if feasible to advance diet  12/11 Patient agitated and revealing new delusions. Plan cont to titrate Seroquel and Prolixin. will hold off on rescheduling cine to avoid inadvertently provoking her. Will lower melatonin as she blames this for sedating her leading t missed appointment  12/12 Paranoid, somatic cont current meds.  12/13 Labile, with periods of agitation. Continue current medications   12/16 Not much change cont current meds will increase fluphenazine next shot. Patient refusing to go for cine  12/17 No major changes patient refusing Depakote about half the time, if continues will discuss with neuro what options are available  12/18 Possibly more psychotic, will increase fluphenazine dec next dose.   12/19 More delusional plan increase next dec dose. Will not try at this time givwe acute IM as per court order as leads to increased agitation more than clear benefit. MAy need to return to court for order to give Insulin per court order  12/20 Irritable, angry with receiving SHAFER. Have increase Prolixin to 25mg. Will change metformin Vladislav alfonso hope she will accept a diffenent DM med that is only once a day otherwise may need to pursue TOO  12/23 Calmer since last Shafer dose still delusional. Cont curren t meds except she is willing to go back to metformin will restart and dc Trajenta  12/24 Patient calmer still irritable delusional thought disordered. FS elevated but she is now beginning to resume compliance with metformin which has been effective in fast. Cont current meds for now.   12/25: Calm but remains delusional and thought disordered   12/26 Calmer but psychotic, poorly compliant Cont meds, eval need to consider court fro diabetic treatment over objection  12/27 Less severely agitated since last SHAFER dose.Cont to encourage compliance with PO meds especially given absence of viable IM alternatives but may need to consider TOO for diabetic meds if situation worsens  12/30. Very psychotic though less tenuous and agitated. Will taper off Seroquel to minimize contributions to poor diabetic control. Added Trajenta to metformin after discussion with medicine. Will hold off on court for DM meds to see how she does with above interventions  12/31 Patient very psychotic, aggressive. Cont current meds, consider increase Prolixin next shot or moving up dejon of injection. Consider alternates to Seroquel given sugars high and she is uncooperative with diabetic meds. Family informed of restraint  1/1: Remains intermittently agitated, aggressive, today disorganized but not aggressive, able to be redirected   1/2 Patient less agitated but paranoid, irritable, delusional about pregnancy, refusing most po meds. Reviewed with medicine, no clinical emergency will cont to encourage compliance. Note refusing Depakote but last EEG 9/30 neg for epileptiform activity Will d/c Seroquel as refusing and may increase glucose and will offer Abilify.   1/3 Pt has periods of irritability, refusing FS and vitals though seen active in day area, disorganized. Took dose of Abilify yesterday though nonadherent today.  1/4-1/5: Agitated and disorganized on 1/4 throwing things, pouring drink on her head, sitting in her urine and refusing to be cleaned, requires prn meds but better on 1/5 1/6 Patient refusing all po meds, having periods of agitation, cont to encourage compliance, consider moving up next dec and or increasing dose. Med refuses remain non emergent. Will consider TOO for whatever meds can be given parenterally  1/7 Patient grossly psychotic, no severe agitation/aggression. Refusing po meds. Feel risk benefit of giving IM antipsychotic is poor as the process results in extreme agitation/agression, need for restraints with only brief calming effect. Similar right now risk benefit of getting court order for diabetic meds is not favorable but can be re-evaluated  1/8 No major changes. Level of agitation irritability tend to be variable. Cont current meds w/o change  1/9 No major changes. Cont meds encourage compliance give dec 1/13 1/13: Patient showing some improvement, less agitation, received Prolixin Dec 25mg SHAFER today on 1/13, awaiting [facility] bed.    Plan:  -[facility] 2S under C legal status  -Routine checks  -Bed block  -Continue Depakote 1000mg BID  -Continue Seroquel 150 mg QHS  -Prolixin Dec 18.75 mg received on 11/6/24.  -Neuro workup at [facility] negative for seizure activity or acute stroke Ms Azul is a 72 yo F w history of SCZ, Parkinson's dz, hypothyroidism, and multiple psych hospitalizations who presents for AMS. Pt transferred from [facility], where she was found to have fallen and was covered in urine. Pt has been hospitalized in [facility] with occasional stays at [facility] for medical concerns since the start of this year for psychosis. Per [facility] notes, pt appears to be awaiting transfer to [facility] hospital. Pt has been delusional at [facility] with recent delusions surrounding pregnancy. Current medications are Depakote 750 mg bid, Seroquel 50 mg bid, and Prolixin Dec 12.5 mg IM q2w (last dose 9/24).     In [facility] stroke code was negative for acute pathology. Pt refused MRI and neuro deemed it not necessary. EEG did not reveal seizure activity and toxic encephalopathy workup was also negative. Psychiatry continued Depakote 1000mg BID, Seroquel 75mg QHS and Prolixin Dec 12.5 mg IM q2w (last dose given on 10/9), next dose due 10/23/24. As per psych CL pt has been irritable, aggressive at times requiring PRN medications and restraints. Paranoid and disorganized.     On arrival to  pt continues to present paranoid delusions, mentions she was poisoned by nurses at [facility], continues to endorse delusions of pregnancy. Denies SI/HI. Requires inpatient level of care due to inability to care for herself or transition to outpatient level of care given severe episodes of agitation, aggression and disorganized behavior in the context of psychosis.    10/22 Clinical Update: Ms. Azul refused to have her vital signs taken this morning. No behavioral outbursts or aggression reported. Bloodwork reviewed and WNL. Valproic acid level within therapeutic range at 65.70. Pt is visible in the dayroom today. Seems sedated, dozing off during interview. May need Seroquel dose to be adjusted to minimize daytime sedation. Due for Prolixin dec tomorrow.    10/23: Refused VS, irritable on approach, will offer Prolixin Dec tomorrow if more agreeable.  Did take PO meds.    10/24: Episodic agitation, did require IM prn last night.  Refused SHAFER and declined AM meds.  10/25: Remains irritable, refusing meds and VS, declines SHAFER, will likely require MOO.  10/28 Patient psychotic tenuous will increase Seroquel request transfer hearing start TOO, change prn to Prolixin Benadryl as never has had much response to olanzapine  10/29 Somewhat calmer today possibly from prn and taking Seroquel last night but refused BP meds, and Depakote which may affect medical condition encourage compliance placed MOO paperwork  10/30 Selectively compliant with medications. Remains illogical, paranoid, uncooperative with care  10/31 Psychotic disorganized needing prns taking medical meds part of time. Cont meds plan TOO hearing  11/1 Calmer at moment but quite psychotic, compliance with medical and psych meds very erratic Cont meds and plan for TOO hearing  11/2: Noncompliant with medications, uncooperative with unit rules and ADL care.   11/3: Remains paranoid  11/4 Agitated tenuous poorly compliant will go to court for TOO, will move Seroquel to all PM at her request with hope this may improve compliance  11/5 Somewhat calmer today no prns, very psychotic, erratic compliance. Will offer decanoate today  she is due, plan court for TOO in AM.  11/6 Patient psychotic, not much change will give Prolixin  18.75mg today, Cont to titrate Seroquel but only hs at her request.   11/7 Patient irritable, aggressive delusional. Cont decanoate along with increasing Seroquel and using prns , await state transfer  11/8 Patient irritable more negativistic today do not feel though she has catatonic mutism as she is talking to other staff and peers other than writer. Cont to titrate Seroquel if she is taking regularly. If she refuses Depakote regularly may need to eval for transfer for IV anti convulsants.  11/9 guarded, easily irritable but more visible, no acute events overnight/this am, partially compliant with medical meds. No SI/HI reported.   11/12 guarded, uncooperative, disorganized and irritable, partially cooperative with meds, refuses vitals.   11/13 needed PRN IM and restraints for agitation, physical aggression, calmer later on. Remains delusional, disorganized and labile, partially compliant with medications.  11/14 no overnight events, no PRN needed, refused am meds, complied with hs seroquel. Remains guarded, uncooperative and dismissive, no suicidal/homicidal behavior.   11/15 no acute outbursts, no PRN needed. She does not think seroquel is helpful, only partially compliant with medications, no SI/HI. She spoke to  about abilify, to review her past medications trials.  11/18 Cont current meds including Shafer when due will re-eval Seroquel though other options limited will get ua and if patient will allow will add prn laxatives  11/19 Psychotic not much change level of agitation fluctuates while she regularly expresses paranoia and delusions of pregnancy. Cont current meds encourage compliant encourage allowing assist with ADL's. UA and C and S re-ordered  11/20 Not much change cont resistive to help with hygiene, delusional, irritable. Expressing willingness to try Abilify to [facility], will explore with patient  11/21 Patient calmer today possibly related to prn. Patient expressing willingness to try Abilify but based on past she rarely follows through. Cont current meds for now. U/A neg  no evidence UTI Incontinence likely mix of anatomical and behavioral factors  11/22: More subdued possibly some incremental benefit from regular dosing of decanoate. Will ocntinue will cont Seroquel rather than make changes when patient showing some gains, Will sl increase Seroquel as she appeared in past to benefit from 300mg/d  11/25 Modest gains overall with ongoing epsiodic agitation. Increase Seroquel give next dec of Prolixin at week 3. Ordered podiatry clinic.   11/26 Some gains with administrsation of dec next due 11/29. Plan further titration Seroquel  11/27 Modst gains. Next dec 11/29, plan further titration Seroquel  11/29 Remains with refractory illness. Cont meds, for dec today, plan further titration of Seroquel  12/2 Patient without much change, psychotic, irritable, tenuous at times. Cont treatment consider increasing Seroquel. Will ask PT to re-eval walker generally viewed as not appropriate as she may use it in unsafe way.  12/3 Patient calmer with SHAFER. Cont current meds consider increasing Seroquel. ordered dental consult. Will consider speech consult with hope advancing diet  12/4 Patient w/o much changed, scheduled for dental appointment. Not considered safe to give walker due to risk of aggression Will get labs in AM on routine   basis  12/5 Patient with some gains. Will increase Seroquel will have her seen in dental tomorrow. reviewed plan of care with sister  12/6 Patient improved modestly cont current meds plan to titrate Seroquel consider making Prolixin 25mg monthly to decrease number of injections can return to dental if agrees to extraction or any deterioration occurs  12/9 Patient partially improved cont current meds will increase dose of dec next shot to see if can give q4 weeks  12/10 Modest gains. Cont meds consider increasing Seroquel. Ordered cine to see if feasible to advance diet  12/11 Patient agitated and revealing new delusions. Plan cont to titrate Seroquel and Prolixin. will hold off on rescheduling cine to avoid inadvertently provoking her. Will lower melatonin as she blames this for sedating her leading t missed appointment  12/12 Paranoid, somatic cont current meds.  12/13 Labile, with periods of agitation. Continue current medications   12/16 Not much change cont current meds will increase fluphenazine next shot. Patient refusing to go for cine  12/17 No major changes patient refusing Depakote about half the time, if continues will discuss with neuro what options are available  12/18 Possibly more psychotic, will increase fluphenazine dec next dose.   12/19 More delusional plan increase next dec dose. Will not try at this time givwe acute IM as per court order as leads to increased agitation more than clear benefit. MAy need to return to court for order to give Insulin per court order  12/20 Irritable, angry with receiving SHAFER. Have increase Prolixin to 25mg. Will change metformin Vladislav alfonso hope she will accept a diffenent DM med that is only once a day otherwise may need to pursue TOO  12/23 Calmer since last Shafer dose still delusional. Cont curren t meds except she is willing to go back to metformin will restart and dc Trajenta  12/24 Patient calmer still irritable delusional thought disordered. FS elevated but she is now beginning to resume compliance with metformin which has been effective in fast. Cont current meds for now.   12/25: Calm but remains delusional and thought disordered   12/26 Calmer but psychotic, poorly compliant Cont meds, eval need to consider court fro diabetic treatment over objection  12/27 Less severely agitated since last SHAFER dose.Cont to encourage compliance with PO meds especially given absence of viable IM alternatives but may need to consider TOO for diabetic meds if situation worsens  12/30. Very psychotic though less tenuous and agitated. Will taper off Seroquel to minimize contributions to poor diabetic control. Added Trajenta to metformin after discussion with medicine. Will hold off on court for DM meds to see how she does with above interventions  12/31 Patient very psychotic, aggressive. Cont current meds, consider increase Prolixin next shot or moving up dejon of injection. Consider alternates to Seroquel given sugars high and she is uncooperative with diabetic meds. Family informed of restraint  1/1: Remains intermittently agitated, aggressive, today disorganized but not aggressive, able to be redirected   1/2 Patient less agitated but paranoid, irritable, delusional about pregnancy, refusing most po meds. Reviewed with medicine, no clinical emergency will cont to encourage compliance. Note refusing Depakote but last EEG 9/30 neg for epileptiform activity Will d/c Seroquel as refusing and may increase glucose and will offer Abilify.   1/3 Pt has periods of irritability, refusing FS and vitals though seen active in day area, disorganized. Took dose of Abilify yesterday though nonadherent today.  1/4-1/5: Agitated and disorganized on 1/4 throwing things, pouring drink on her head, sitting in her urine and refusing to be cleaned, requires prn meds but better on 1/5 1/6 Patient refusing all po meds, having periods of agitation, cont to encourage compliance, consider moving up next dec and or increasing dose. Med refuses remain non emergent. Will consider TOO for whatever meds can be given parenterally  1/7 Patient grossly psychotic, no severe agitation/aggression. Refusing po meds. Feel risk benefit of giving IM antipsychotic is poor as the process results in extreme agitation/agression, need for restraints with only brief calming effect. Similar right now risk benefit of getting court order for diabetic meds is not favorable but can be re-evaluated  1/8 No major changes. Level of agitation irritability tend to be variable. Cont current meds w/o change  1/9 No major changes. Cont meds encourage compliance give dec 1/13 1/13: Patient showing some improvement, less agitation, received Prolixin Dec 25mg SHAFER today on 1/13, [facility]  1/14: No interval changes, tolerated Prolixin Dec with no side effects.  Pending state hospital transfer.    Plan:  -[facility] 2S under [facility]PC legal status  -Routine checks  -Bed block  -Continue Depakote 1000mg BID  -Continue Seroquel 150 mg QHS  -Prolixin Dec 18.75 mg received on 11/6/24.  -Neuro workup at [facility] negative for seizure activity or acute stroke

## 2025-01-14 NOTE — BH INPATIENT PSYCHIATRY PROGRESS NOTE - NSBHFUPINTERVALHXFT_PSY_A_CORE
Patient is seen and evaluated for follow up for psychosis.  Chart is reviewed and case is discussed with nursing team.  No issues overnight.  Refused vitals today refusing fs. Eating sleeping well, somewhat calmer. Provocative at times, refused PO meds but did receive Prolixin Decanoate 25mg SHAFER this morning with some irritability noted, but after improved, did take a shower.  No somatic complaints and less agitation noted. Patient is seen and evaluated for follow up for psychosis.  Chart is reviewed and case is discussed with nursing team.  No issues overnight.  Refused vitals today refusing fs. Eating sleeping well, somewhat calmer. Provocative at times, refusing PO meds but did receive Prolixin Decanoate 25mg SHAFER yesterday with some irritability noted sporadically.  No somatic complaints and less agitation noted.

## 2025-01-14 NOTE — BH INPATIENT PSYCHIATRY PROGRESS NOTE - CURRENT MEDICATION
MEDICATIONS  (STANDING):  ARIPiprazole 5 milliGRAM(s) Oral daily  cholecalciferol 1000 Unit(s) Oral daily  divalproex Sprinkle 1000 milliGRAM(s) Oral two times a day  glucagon  Injectable 1 milliGRAM(s) IntraMuscular once  insulin lispro (ADMELOG) corrective regimen sliding scale   SubCutaneous three times a day before meals  insulin lispro (ADMELOG) corrective regimen sliding scale   SubCutaneous at bedtime  levothyroxine 75 MICROGram(s) Oral daily  linagliptin 5 milliGRAM(s) Oral daily  melatonin. 3 milliGRAM(s) Oral at bedtime  metFORMIN 1000 milliGRAM(s) Oral two times a day with meals  metoprolol succinate ER 50 milliGRAM(s) Oral daily  multivitamin 2 Tablet(s) Oral at bedtime  senna 2 Tablet(s) Oral at bedtime    MEDICATIONS  (PRN):  acetaminophen     Tablet .. 650 milliGRAM(s) Oral every 6 hours PRN Mild Pain (1 - 3)  albuterol    90 MICROgram(s) HFA Inhaler 2 Puff(s) Inhalation every 6 hours PRN Bronchospasm  aluminum hydroxide/magnesium hydroxide/simethicone Suspension 30 milliLiter(s) Oral every 4 hours PRN Dyspepsia  bisacodyl 5 milliGRAM(s) Oral daily PRN Constipation  dextrose Oral Gel 15 Gram(s) Oral once PRN hypoglycemia  dextrose Oral Gel 15 Gram(s) Oral once PRN Blood Glucose LESS THAN 70 milliGRAM(s)/deciliter  diphenhydrAMINE Elixir 12.5 milliGRAM(s) Oral every 6 hours PRN eps prevention  diphenhydrAMINE Injectable 25 milliGRAM(s) IntraMuscular once PRN EPS prevention  fluPHENAZine 5 milliGRAM(s) Oral every 6 hours PRN agitation  fluPHENAZine  Injectable 5 milliGRAM(s) IntraMuscular once PRN agitation  polyethylene glycol 3350 17 Gram(s) Oral daily PRN constipation

## 2025-01-14 NOTE — BH INPATIENT PSYCHIATRY PROGRESS NOTE - NSBHMETABOLIC_PSY_ALL_CORE_FT
BMI: BMI (kg/m2): 29.7 (10-21-24 @ 16:58)  HbA1c: A1C with Estimated Average Glucose Result: 7.3 % (09-29-24 @ 17:15)    Glucose: POCT Blood Glucose.: 286 mg/dL (12-30-24 @ 11:56)    BP: --Vital Signs Last 24 Hrs  T(C): --  T(F): --  HR: --  BP: --  BP(mean): --  RR: --  SpO2: --      Lipid Panel: Date/Time: 09-30-24 @ 04:45  Cholesterol, Serum: 148  LDL Cholesterol Calculated: 64  HDL Cholesterol, Serum: 56  Total Cholesterol/HDL Ration Measurement: --  Triglycerides, Serum: 138   BMI: BMI (kg/m2): 29.7 (10-21-24 @ 16:58)  HbA1c: A1C with Estimated Average Glucose Result: 7.3 % (09-29-24 @ 17:15)    Glucose: POCT Blood Glucose.: 201 mg/dL (01-15-25 @ 20:39)    BP: --Vital Signs Last 24 Hrs  T(C): --  T(F): --  HR: --  BP: --  BP(mean): --  RR: --  SpO2: --      Lipid Panel: Date/Time: 09-30-24 @ 04:45  Cholesterol, Serum: 148  LDL Cholesterol Calculated: 64  HDL Cholesterol, Serum: 56  Total Cholesterol/HDL Ration Measurement: --  Triglycerides, Serum: 138

## 2025-01-15 LAB — GLUCOSE BLDC GLUCOMTR-MCNC: 201 MG/DL — HIGH (ref 70–99)

## 2025-01-15 NOTE — BH INPATIENT PSYCHIATRY PROGRESS NOTE - NSBHFUPINTERVALHXFT_PSY_A_CORE
Patient is seen and evaluated for follow up for psychosis.  Chart is reviewed and case is discussed with nursing team.  No issues overnight.  Refused vitals today refusing fs. Eating sleeping well, somewhat calmer. Provocative at times, refusing PO meds but did receive Prolixin Decanoate 25mg SHAFER on Monday 1/13 which was well tolerated.  Pt with some irritability noted sporadically, d/w pt dissatisfaction with diet, considering ordering speech and swallow evaluation.  Less agitation but still tenuous control.

## 2025-01-15 NOTE — BH INPATIENT PSYCHIATRY PROGRESS NOTE - NSBHMETABOLIC_PSY_ALL_CORE_FT
BMI: BMI (kg/m2): 29.7 (10-21-24 @ 16:58)  HbA1c: A1C with Estimated Average Glucose Result: 7.3 % (09-29-24 @ 17:15)    Glucose: POCT Blood Glucose.: 201 mg/dL (01-15-25 @ 20:39)    BP: --Vital Signs Last 24 Hrs  T(C): --  T(F): --  HR: --  BP: --  BP(mean): --  RR: --  SpO2: --      Lipid Panel: Date/Time: 09-30-24 @ 04:45  Cholesterol, Serum: 148  LDL Cholesterol Calculated: 64  HDL Cholesterol, Serum: 56  Total Cholesterol/HDL Ration Measurement: --  Triglycerides, Serum: 138
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
## 2025-01-16 NOTE — BH INPATIENT PSYCHIATRY PROGRESS NOTE - NSBHASSESSSUMMFT_PSY_ALL_CORE
Ms Azul is a 72 yo F w history of SCZ, Parkinson's dz, hypothyroidism, and multiple psych hospitalizations who presents for AMS. Pt transferred from [facility], where she was found to have fallen and was covered in urine. Pt has been hospitalized in [facility] with occasional stays at [facility] for medical concerns since the start of this year for psychosis. Per [facility] notes, pt appears to be awaiting transfer to [facility] hospital. Pt has been delusional at [facility] with recent delusions surrounding pregnancy. Current medications are Depakote 750 mg bid, Seroquel 50 mg bid, and Prolixin Dec 12.5 mg IM q2w (last dose 9/24).     In [facility] stroke code was negative for acute pathology. Pt refused MRI and neuro deemed it not necessary. EEG did not reveal seizure activity and toxic encephalopathy workup was also negative. Psychiatry continued Depakote 1000mg BID, Seroquel 75mg QHS and Prolixin Dec 12.5 mg IM q2w (last dose given on 10/9), next dose due 10/23/24. As per psych CL pt has been irritable, aggressive at times requiring PRN medications and restraints. Paranoid and disorganized.     On arrival to  pt continues to present paranoid delusions, mentions she was poisoned by nurses at [facility], continues to endorse delusions of pregnancy. Denies SI/HI. Requires inpatient level of care due to inability to care for herself or transition to outpatient level of care given severe episodes of agitation, aggression and disorganized behavior in the context of psychosis.    10/22 Clinical Update: Ms. Azul refused to have her vital signs taken this morning. No behavioral outbursts or aggression reported. Bloodwork reviewed and WNL. Valproic acid level within therapeutic range at 65.70. Pt is visible in the dayroom today. Seems sedated, dozing off during interview. May need Seroquel dose to be adjusted to minimize daytime sedation. Due for Prolixin dec tomorrow.    10/23: Refused VS, irritable on approach, will offer Prolixin Dec tomorrow if more agreeable.  Did take PO meds.    10/24: Episodic agitation, did require IM prn last night.  Refused SHAFER and declined AM meds.  10/25: Remains irritable, refusing meds and VS, declines SHAFER, will likely require MOO.  10/28 Patient psychotic tenuous will increase Seroquel request transfer hearing start TOO, change prn to Prolixin Benadryl as never has had much response to olanzapine  10/29 Somewhat calmer today possibly from prn and taking Seroquel last night but refused BP meds, and Depakote which may affect medical condition encourage compliance placed MOO paperwork  10/30 Selectively compliant with medications. Remains illogical, paranoid, uncooperative with care  10/31 Psychotic disorganized needing prns taking medical meds part of time. Cont meds plan TOO hearing  11/1 Calmer at moment but quite psychotic, compliance with medical and psych meds very erratic Cont meds and plan for TOO hearing  11/2: Noncompliant with medications, uncooperative with unit rules and ADL care.   11/3: Remains paranoid  11/4 Agitated tenuous poorly compliant will go to court for TOO, will move Seroquel to all PM at her request with hope this may improve compliance  11/5 Somewhat calmer today no prns, very psychotic, erratic compliance. Will offer decanoate today  she is due, plan court for TOO in AM.  11/6 Patient psychotic, not much change will give Prolixin  18.75mg today, Cont to titrate Seroquel but only hs at her request.   11/7 Patient irritable, aggressive delusional. Cont decanoate along with increasing Seroquel and using prns , await state transfer  11/8 Patient irritable more negativistic today do not feel though she has catatonic mutism as she is talking to other staff and peers other than writer. Cont to titrate Seroquel if she is taking regularly. If she refuses Depakote regularly may need to eval for transfer for IV anti convulsants.  11/9 guarded, easily irritable but more visible, no acute events overnight/this am, partially compliant with medical meds. No SI/HI reported.   11/12 guarded, uncooperative, disorganized and irritable, partially cooperative with meds, refuses vitals.   11/13 needed PRN IM and restraints for agitation, physical aggression, calmer later on. Remains delusional, disorganized and labile, partially compliant with medications.  11/14 no overnight events, no PRN needed, refused am meds, complied with hs seroquel. Remains guarded, uncooperative and dismissive, no suicidal/homicidal behavior.   11/15 no acute outbursts, no PRN needed. She does not think seroquel is helpful, only partially compliant with medications, no SI/HI. She spoke to  about abilify, to review her past medications trials.  11/18 Cont current meds including Shafer when due will re-eval Seroquel though other options limited will get ua and if patient will allow will add prn laxatives  11/19 Psychotic not much change level of agitation fluctuates while she regularly expresses paranoia and delusions of pregnancy. Cont current meds encourage compliant encourage allowing assist with ADL's. UA and C and S re-ordered  11/20 Not much change cont resistive to help with hygiene, delusional, irritable. Expressing willingness to try Abilify to [facility], will explore with patient  11/21 Patient calmer today possibly related to prn. Patient expressing willingness to try Abilify but based on past she rarely follows through. Cont current meds for now. U/A neg  no evidence UTI Incontinence likely mix of anatomical and behavioral factors  11/22: More subdued possibly some incremental benefit from regular dosing of decanoate. Will ocntinue will cont Seroquel rather than make changes when patient showing some gains, Will sl increase Seroquel as she appeared in past to benefit from 300mg/d  11/25 Modest gains overall with ongoing epsiodic agitation. Increase Seroquel give next dec of Prolixin at week 3. Ordered podiatry clinic.   11/26 Some gains with administrsation of dec next due 11/29. Plan further titration Seroquel  11/27 Modst gains. Next dec 11/29, plan further titration Seroquel  11/29 Remains with refractory illness. Cont meds, for dec today, plan further titration of Seroquel  12/2 Patient without much change, psychotic, irritable, tenuous at times. Cont treatment consider increasing Seroquel. Will ask PT to re-eval walker generally viewed as not appropriate as she may use it in unsafe way.  12/3 Patient calmer with SHAFER. Cont current meds consider increasing Seroquel. ordered dental consult. Will consider speech consult with hope advancing diet  12/4 Patient w/o much changed, scheduled for dental appointment. Not considered safe to give walker due to risk of aggression Will get labs in AM on routine   basis  12/5 Patient with some gains. Will increase Seroquel will have her seen in dental tomorrow. reviewed plan of care with sister  12/6 Patient improved modestly cont current meds plan to titrate Seroquel consider making Prolixin 25mg monthly to decrease number of injections can return to dental if agrees to extraction or any deterioration occurs  12/9 Patient partially improved cont current meds will increase dose of dec next shot to see if can give q4 weeks  12/10 Modest gains. Cont meds consider increasing Seroquel. Ordered cine to see if feasible to advance diet  12/11 Patient agitated and revealing new delusions. Plan cont to titrate Seroquel and Prolixin. will hold off on rescheduling cine to avoid inadvertently provoking her. Will lower melatonin as she blames this for sedating her leading t missed appointment  12/12 Paranoid, somatic cont current meds.  12/13 Labile, with periods of agitation. Continue current medications   12/16 Not much change cont current meds will increase fluphenazine next shot. Patient refusing to go for cine  12/17 No major changes patient refusing Depakote about half the time, if continues will discuss with neuro what options are available  12/18 Possibly more psychotic, will increase fluphenazine dec next dose.   12/19 More delusional plan increase next dec dose. Will not try at this time givwe acute IM as per court order as leads to increased agitation more than clear benefit. MAy need to return to court for order to give Insulin per court order  12/20 Irritable, angry with receiving SHAFER. Have increase Prolixin to 25mg. Will change metformin Vladislav alfonso hope she will accept a diffenent DM med that is only once a day otherwise may need to pursue TOO  12/23 Calmer since last Shafer dose still delusional. Cont curren t meds except she is willing to go back to metformin will restart and dc Trajenta  12/24 Patient calmer still irritable delusional thought disordered. FS elevated but she is now beginning to resume compliance with metformin which has been effective in fast. Cont current meds for now.   12/25: Calm but remains delusional and thought disordered   12/26 Calmer but psychotic, poorly compliant Cont meds, eval need to consider court fro diabetic treatment over objection  12/27 Less severely agitated since last SHAFER dose.Cont to encourage compliance with PO meds especially given absence of viable IM alternatives but may need to consider TOO for diabetic meds if situation worsens  12/30. Very psychotic though less tenuous and agitated. Will taper off Seroquel to minimize contributions to poor diabetic control. Added Trajenta to metformin after discussion with medicine. Will hold off on court for DM meds to see how she does with above interventions  12/31 Patient very psychotic, aggressive. Cont current meds, consider increase Prolixin next shot or moving up dejon of injection. Consider alternates to Seroquel given sugars high and she is uncooperative with diabetic meds. Family informed of restraint  1/1: Remains intermittently agitated, aggressive, today disorganized but not aggressive, able to be redirected   1/2 Patient less agitated but paranoid, irritable, delusional about pregnancy, refusing most po meds. Reviewed with medicine, no clinical emergency will cont to encourage compliance. Note refusing Depakote but last EEG 9/30 neg for epileptiform activity Will d/c Seroquel as refusing and may increase glucose and will offer Abilify.   1/3 Pt has periods of irritability, refusing FS and vitals though seen active in day area, disorganized. Took dose of Abilify yesterday though nonadherent today.  1/4-1/5: Agitated and disorganized on 1/4 throwing things, pouring drink on her head, sitting in her urine and refusing to be cleaned, requires prn meds but better on 1/5 1/6 Patient refusing all po meds, having periods of agitation, cont to encourage compliance, consider moving up next dec and or increasing dose. Med refuses remain non emergent. Will consider TOO for whatever meds can be given parenterally  1/7 Patient grossly psychotic, no severe agitation/aggression. Refusing po meds. Feel risk benefit of giving IM antipsychotic is poor as the process results in extreme agitation/agression, need for restraints with only brief calming effect. Similar right now risk benefit of getting court order for diabetic meds is not favorable but can be re-evaluated  1/8 No major changes. Level of agitation irritability tend to be variable. Cont current meds w/o change  1/9 No major changes. Cont meds encourage compliance give dec 1/13 1/13: Patient showing some improvement, less agitation, received Prolixin Dec 25mg SHAFER today on 1/13, awaiting state hospital bed.  1/14: No interval changes, tolerated Prolixin Dec with no side effects.  Pending state hospital transfer.  1/15: No changes, irritability noted but less agitation and received SHAFER on 1/13.  1/16: No major changes. Level of agitation irritability tend to be variable. Cont current meds w/o change, did receive SHAFER on 1/13/25.    Plan:  -[facility]S under [facility] legal status  -Routine checks  -Bed block  -Continue Depakote 1000mg BID  -Continue Seroquel 150 mg QHS  -Prolixin Dec 18.75 mg received on 11/6/24.  -Neuro workup at [facility] negative for seizure activity or acute stroke

## 2025-01-16 NOTE — BH INPATIENT PSYCHIATRY PROGRESS NOTE - NSBHFUPINTERVALHXFT_PSY_A_CORE
Patient is seen and evaluated for follow up for psychosis.  Chart is reviewed and case is discussed with nursing team.  No issues overnight.  Refused vitals today refusing fs. Eating sleeping well, somewhat calmer. Provocative at times, refusing PO meds but did receive Prolixin Decanoate 25mg SAHFER on Monday 1/13 which was well tolerated.  Pt with some irritability noted sporadically, d/w pt dissatisfaction with diet, considering ordering speech and swallow evaluation.  Less agitation but still tenuous control.

## 2025-01-17 PROCEDURE — 99232 SBSQ HOSP IP/OBS MODERATE 35: CPT

## 2025-01-17 NOTE — BH INPATIENT PSYCHIATRY PROGRESS NOTE - NSBHFUPINTERVALHXFT_PSY_A_CORE
Patient is seen and evaluated for follow up for psychosis.  Chart is reviewed and case is discussed with nursing team.  No issues overnight.  Refused vitals today refusing fs. Eating sleeping well, somewhat calmer. Provocative at times, refusing PO meds but did receive Prolixin Decanoate 25mg SHAFER on Monday 1/13 which was well tolerated.  Pt with some irritability noted sporadically, d/w pt dissatisfaction with diet, modified barium swallow study ordered and pt agrees to plan for such.  Less agitation but still tenuous control.

## 2025-01-17 NOTE — BH INPATIENT PSYCHIATRY PROGRESS NOTE - NSBHASSESSSUMMFT_PSY_ALL_CORE
Ms Azul is a 72 yo F w history of SCZ, Parkinson's dz, hypothyroidism, and multiple psych hospitalizations who presents for AMS. Pt transferred from [facility], where she was found to have fallen and was covered in urine. Pt has been hospitalized in [facility] with occasional stays at [facility] for medical concerns since the start of this year for psychosis. Per [facility] notes, pt appears to be awaiting transfer to [facility] hospital. Pt has been delusional at [facility] with recent delusions surrounding pregnancy. Current medications are Depakote 750 mg bid, Seroquel 50 mg bid, and Prolixin Dec 12.5 mg IM q2w (last dose 9/24).     In [facility] stroke code was negative for acute pathology. Pt refused MRI and neuro deemed it not necessary. EEG did not reveal seizure activity and toxic encephalopathy workup was also negative. Psychiatry continued Depakote 1000mg BID, Seroquel 75mg QHS and Prolixin Dec 12.5 mg IM q2w (last dose given on 10/9), next dose due 10/23/24. As per psych CL pt has been irritable, aggressive at times requiring PRN medications and restraints. Paranoid and disorganized.     On arrival to  pt continues to present paranoid delusions, mentions she was poisoned by nurses at [facility], continues to endorse delusions of pregnancy. Denies SI/HI. Requires inpatient level of care due to inability to care for herself or transition to outpatient level of care given severe episodes of agitation, aggression and disorganized behavior in the context of psychosis.    10/22 Clinical Update: Ms. Azul refused to have her vital signs taken this morning. No behavioral outbursts or aggression reported. Bloodwork reviewed and WNL. Valproic acid level within therapeutic range at 65.70. Pt is visible in the dayroom today. Seems sedated, dozing off during interview. May need Seroquel dose to be adjusted to minimize daytime sedation. Due for Prolixin dec tomorrow.    10/23: Refused VS, irritable on approach, will offer Prolixin Dec tomorrow if more agreeable.  Did take PO meds.    10/24: Episodic agitation, did require IM prn last night.  Refused SHAFER and declined AM meds.  10/25: Remains irritable, refusing meds and VS, declines SHAFER, will likely require MOO.  10/28 Patient psychotic tenuous will increase Seroquel request transfer hearing start TOO, change prn to Prolixin Benadryl as never has had much response to olanzapine  10/29 Somewhat calmer today possibly from prn and taking Seroquel last night but refused BP meds, and Depakote which may affect medical condition encourage compliance placed MOO paperwork  10/30 Selectively compliant with medications. Remains illogical, paranoid, uncooperative with care  10/31 Psychotic disorganized needing prns taking medical meds part of time. Cont meds plan TOO hearing  11/1 Calmer at moment but quite psychotic, compliance with medical and psych meds very erratic Cont meds and plan for TOO hearing  11/2: Noncompliant with medications, uncooperative with unit rules and ADL care.   11/3: Remains paranoid  11/4 Agitated tenuous poorly compliant will go to court for TOO, will move Seroquel to all PM at her request with hope this may improve compliance  11/5 Somewhat calmer today no prns, very psychotic, erratic compliance. Will offer decanoate today  she is due, plan court for TOO in AM.  11/6 Patient psychotic, not much change will give Prolixin  18.75mg today, Cont to titrate Seroquel but only hs at her request.   11/7 Patient irritable, aggressive delusional. Cont decanoate along with increasing Seroquel and using prns , await state transfer  11/8 Patient irritable more negativistic today do not feel though she has catatonic mutism as she is talking to other staff and peers other than writer. Cont to titrate Seroquel if she is taking regularly. If she refuses Depakote regularly may need to eval for transfer for IV anti convulsants.  11/9 guarded, easily irritable but more visible, no acute events overnight/this am, partially compliant with medical meds. No SI/HI reported.   11/12 guarded, uncooperative, disorganized and irritable, partially cooperative with meds, refuses vitals.   11/13 needed PRN IM and restraints for agitation, physical aggression, calmer later on. Remains delusional, disorganized and labile, partially compliant with medications.  11/14 no overnight events, no PRN needed, refused am meds, complied with hs seroquel. Remains guarded, uncooperative and dismissive, no suicidal/homicidal behavior.   11/15 no acute outbursts, no PRN needed. She does not think seroquel is helpful, only partially compliant with medications, no SI/HI. She spoke to  about abilify, to review her past medications trials.  11/18 Cont current meds including Shafer when due will re-eval Seroquel though other options limited will get ua and if patient will allow will add prn laxatives  11/19 Psychotic not much change level of agitation fluctuates while she regularly expresses paranoia and delusions of pregnancy. Cont current meds encourage compliant encourage allowing assist with ADL's. UA and C and S re-ordered  11/20 Not much change cont resistive to help with hygiene, delusional, irritable. Expressing willingness to try Abilify to [facility], will explore with patient  11/21 Patient calmer today possibly related to prn. Patient expressing willingness to try Abilify but based on past she rarely follows through. Cont current meds for now. U/A neg  no evidence UTI Incontinence likely mix of anatomical and behavioral factors  11/22: More subdued possibly some incremental benefit from regular dosing of decanoate. Will ocntinue will cont Seroquel rather than make changes when patient showing some gains, Will sl increase Seroquel as she appeared in past to benefit from 300mg/d  11/25 Modest gains overall with ongoing epsiodic agitation. Increase Seroquel give next dec of Prolixin at week 3. Ordered podiatry clinic.   11/26 Some gains with administrsation of dec next due 11/29. Plan further titration Seroquel  11/27 Modst gains. Next dec 11/29, plan further titration Seroquel  11/29 Remains with refractory illness. Cont meds, for dec today, plan further titration of Seroquel  12/2 Patient without much change, psychotic, irritable, tenuous at times. Cont treatment consider increasing Seroquel. Will ask PT to re-eval walker generally viewed as not appropriate as she may use it in unsafe way.  12/3 Patient calmer with SHAFER. Cont current meds consider increasing Seroquel. ordered dental consult. Will consider speech consult with hope advancing diet  12/4 Patient w/o much changed, scheduled for dental appointment. Not considered safe to give walker due to risk of aggression Will get labs in AM on routine   basis  12/5 Patient with some gains. Will increase Seroquel will have her seen in dental tomorrow. reviewed plan of care with sister  12/6 Patient improved modestly cont current meds plan to titrate Seroquel consider making Prolixin 25mg monthly to decrease number of injections can return to dental if agrees to extraction or any deterioration occurs  12/9 Patient partially improved cont current meds will increase dose of dec next shot to see if can give q4 weeks  12/10 Modest gains. Cont meds consider increasing Seroquel. Ordered cine to see if feasible to advance diet  12/11 Patient agitated and revealing new delusions. Plan cont to titrate Seroquel and Prolixin. will hold off on rescheduling cine to avoid inadvertently provoking her. Will lower melatonin as she blames this for sedating her leading t missed appointment  12/12 Paranoid, somatic cont current meds.  12/13 Labile, with periods of agitation. Continue current medications   12/16 Not much change cont current meds will increase fluphenazine next shot. Patient refusing to go for cine  12/17 No major changes patient refusing Depakote about half the time, if continues will discuss with neuro what options are available  12/18 Possibly more psychotic, will increase fluphenazine dec next dose.   12/19 More delusional plan increase next dec dose. Will not try at this time givwe acute IM as per court order as leads to increased agitation more than clear benefit. MAy need to return to court for order to give Insulin per court order  12/20 Irritable, angry with receiving SHAFER. Have increase Prolixin to 25mg. Will change metformin Vladislav alfonso hope she will accept a diffenent DM med that is only once a day otherwise may need to pursue TOO  12/23 Calmer since last Shafer dose still delusional. Cont curren t meds except she is willing to go back to metformin will restart and dc Trajenta  12/24 Patient calmer still irritable delusional thought disordered. FS elevated but she is now beginning to resume compliance with metformin which has been effective in fast. Cont current meds for now.   12/25: Calm but remains delusional and thought disordered   12/26 Calmer but psychotic, poorly compliant Cont meds, eval need to consider court fro diabetic treatment over objection  12/27 Less severely agitated since last SHAFER dose.Cont to encourage compliance with PO meds especially given absence of viable IM alternatives but may need to consider TOO for diabetic meds if situation worsens  12/30. Very psychotic though less tenuous and agitated. Will taper off Seroquel to minimize contributions to poor diabetic control. Added Trajenta to metformin after discussion with medicine. Will hold off on court for DM meds to see how she does with above interventions  12/31 Patient very psychotic, aggressive. Cont current meds, consider increase Prolixin next shot or moving up dejon of injection. Consider alternates to Seroquel given sugars high and she is uncooperative with diabetic meds. Family informed of restraint  1/1: Remains intermittently agitated, aggressive, today disorganized but not aggressive, able to be redirected   1/2 Patient less agitated but paranoid, irritable, delusional about pregnancy, refusing most po meds. Reviewed with medicine, no clinical emergency will cont to encourage compliance. Note refusing Depakote but last EEG 9/30 neg for epileptiform activity Will d/c Seroquel as refusing and may increase glucose and will offer Abilify.   1/3 Pt has periods of irritability, refusing FS and vitals though seen active in day area, disorganized. Took dose of Abilify yesterday though nonadherent today.  1/4-1/5: Agitated and disorganized on 1/4 throwing things, pouring drink on her head, sitting in her urine and refusing to be cleaned, requires prn meds but better on 1/5 1/6 Patient refusing all po meds, having periods of agitation, cont to encourage compliance, consider moving up next dec and or increasing dose. Med refuses remain non emergent. Will consider TOO for whatever meds can be given parenterally  1/7 Patient grossly psychotic, no severe agitation/aggression. Refusing po meds. Feel risk benefit of giving IM antipsychotic is poor as the process results in extreme agitation/agression, need for restraints with only brief calming effect. Similar right now risk benefit of getting court order for diabetic meds is not favorable but can be re-evaluated  1/8 No major changes. Level of agitation irritability tend to be variable. Cont current meds w/o change  1/9 No major changes. Cont meds encourage compliance give dec 1/13 1/13: Patient showing some improvement, less agitation, received Prolixin Dec 25mg SHAFER today on 1/13, awaiting state hospital bed.  1/14: No interval changes, tolerated Prolixin Dec with no side effects.  Pending state hospital transfer.  1/15: No changes, irritability noted but less agitation and received SHAFER on 1/13.  1/16: No major changes. Level of agitation irritability tend to be variable. Cont current meds w/o change, did receive SHAFER on 1/13/25.  1/17: No interval changes, barium swallow study ordered and scheduled for 1/21 at 9am to possibly advance diet, no med changes.    Plan:  -[facility] under [facility] legal status  -Routine checks  -Bed block  -Continue Depakote 1000mg BID  -Continue Seroquel 150 mg QHS  -Prolixin Dec 18.75 mg received on 11/6/24.  -Neuro workup at [facility] negative for seizure activity or acute stroke

## 2025-01-19 RX ADMIN — ACETAMINOPHEN, DIPHENHYDRAMINE HCL, PHENYLEPHRINE HCL 3 MILLIGRAM(S): 325; 25; 5 TABLET ORAL at 21:45

## 2025-01-19 RX ADMIN — Medication 2 TABLET(S): at 21:45

## 2025-01-19 RX ADMIN — Medication 1000 MILLIGRAM(S): at 21:45

## 2025-01-21 PROCEDURE — 99232 SBSQ HOSP IP/OBS MODERATE 35: CPT

## 2025-01-21 PROCEDURE — 74230 X-RAY XM SWLNG FUNCJ C+: CPT | Mod: 26

## 2025-01-21 RX ADMIN — Medication 2 TABLET(S): at 23:12

## 2025-01-21 RX ADMIN — Medication 1000 MILLIGRAM(S): at 23:13

## 2025-01-21 RX ADMIN — ACETAMINOPHEN, DIPHENHYDRAMINE HCL, PHENYLEPHRINE HCL 3 MILLIGRAM(S): 325; 25; 5 TABLET ORAL at 23:12

## 2025-01-21 NOTE — BH INPATIENT PSYCHIATRY PROGRESS NOTE - NSBHFUPINTERVALHXFT_PSY_A_CORE
Patient is seen and evaluated for follow up for psychosis.  Chart is reviewed and case is discussed with nursing team.  No issues overnight.  Refused vitals today refusing fs. Eating sleeping well, somewhat calmer. Provocative at times, refusing most po meds. Had cine wherein speech recommended advancing diet to soft and bite sized thin fluids
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
## 2025-01-21 NOTE — BH INPATIENT PSYCHIATRY PROGRESS NOTE - CURRENT MEDICATION
MEDICATIONS  (STANDING):  ARIPiprazole 5 milliGRAM(s) Oral daily  cholecalciferol 1000 Unit(s) Oral daily  divalproex Sprinkle 1000 milliGRAM(s) Oral two times a day  glucagon  Injectable 1 milliGRAM(s) IntraMuscular once  insulin lispro (ADMELOG) corrective regimen sliding scale   SubCutaneous at bedtime  insulin lispro (ADMELOG) corrective regimen sliding scale   SubCutaneous three times a day before meals  levothyroxine 75 MICROGram(s) Oral daily  linagliptin 5 milliGRAM(s) Oral daily  melatonin. 3 milliGRAM(s) Oral at bedtime  metFORMIN 1000 milliGRAM(s) Oral two times a day with meals  metoprolol succinate ER 50 milliGRAM(s) Oral daily  multivitamin 2 Tablet(s) Oral at bedtime  senna 2 Tablet(s) Oral at bedtime    MEDICATIONS  (PRN):  acetaminophen     Tablet .. 650 milliGRAM(s) Oral every 6 hours PRN Mild Pain (1 - 3)  albuterol    90 MICROgram(s) HFA Inhaler 2 Puff(s) Inhalation every 6 hours PRN Bronchospasm  aluminum hydroxide/magnesium hydroxide/simethicone Suspension 30 milliLiter(s) Oral every 4 hours PRN Dyspepsia  bisacodyl 5 milliGRAM(s) Oral daily PRN Constipation  dextrose Oral Gel 15 Gram(s) Oral once PRN Blood Glucose LESS THAN 70 milliGRAM(s)/deciliter  dextrose Oral Gel 15 Gram(s) Oral once PRN hypoglycemia  diphenhydrAMINE Elixir 12.5 milliGRAM(s) Oral every 6 hours PRN eps prevention  diphenhydrAMINE Injectable 25 milliGRAM(s) IntraMuscular once PRN EPS prevention  fluPHENAZine 5 milliGRAM(s) Oral every 6 hours PRN agitation  fluPHENAZine  Injectable 5 milliGRAM(s) IntraMuscular once PRN agitation  polyethylene glycol 3350 17 Gram(s) Oral daily PRN constipation

## 2025-01-21 NOTE — BH INPATIENT PSYCHIATRY PROGRESS NOTE - NSBHMETABOLIC_PSY_ALL_CORE_FT
BMI: BMI (kg/m2): 29.7 (10-21-24 @ 16:58)  HbA1c: A1C with Estimated Average Glucose Result: 7.3 % (09-29-24 @ 17:15)    Glucose: POCT Blood Glucose.: 201 mg/dL (01-15-25 @ 20:39)    BP: 140/79 (01-20-25 @ 05:42) (140/79 - 140/79)Vital Signs Last 24 Hrs  T(C): --  T(F): --  HR: --  BP: --  BP(mean): --  RR: --  SpO2: --      Lipid Panel: Date/Time: 09-30-24 @ 04:45  Cholesterol, Serum: 148  LDL Cholesterol Calculated: 64  HDL Cholesterol, Serum: 56  Total Cholesterol/HDL Ration Measurement: --  Triglycerides, Serum: 138

## 2025-01-22 PROCEDURE — 99232 SBSQ HOSP IP/OBS MODERATE 35: CPT

## 2025-01-22 RX ADMIN — Medication 2 TABLET(S): at 19:49

## 2025-01-22 RX ADMIN — ACETAMINOPHEN, DIPHENHYDRAMINE HCL, PHENYLEPHRINE HCL 3 MILLIGRAM(S): 325; 25; 5 TABLET ORAL at 19:48

## 2025-01-22 RX ADMIN — Medication 1000 MILLIGRAM(S): at 19:49

## 2025-01-22 RX ADMIN — Medication 5 MILLIGRAM(S): at 19:49
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
## 2025-01-22 NOTE — BH INPATIENT PSYCHIATRY PROGRESS NOTE - NSBHMETABOLIC_PSY_ALL_CORE_FT
BMI: BMI (kg/m2): 29.7 (10-21-24 @ 16:58)  HbA1c: A1C with Estimated Average Glucose Result: 7.3 % (09-29-24 @ 17:15)    Glucose: POCT Blood Glucose.: 201 mg/dL (01-15-25 @ 20:39)    BP: 142/75 (01-22-25 @ 08:00) (140/79 - 142/75)Vital Signs Last 24 Hrs  T(C): --  T(F): --  HR: 77 (01-22-25 @ 08:00) (77 - 77)  BP: 142/75 (01-22-25 @ 08:00) (142/75 - 142/75)  BP(mean): --  RR: --  SpO2: --      Lipid Panel: Date/Time: 09-30-24 @ 04:45  Cholesterol, Serum: 148  LDL Cholesterol Calculated: 64  HDL Cholesterol, Serum: 56  Total Cholesterol/HDL Ration Measurement: --  Triglycerides, Serum: 138
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
## 2025-01-22 NOTE — BH INPATIENT PSYCHIATRY PROGRESS NOTE - NSBHCHARTREVIEWVS_PSY_A_CORE FT
Vital Signs Last 24 Hrs  T(C): --  T(F): --  HR: 77 (01-22-25 @ 08:00) (77 - 77)  BP: 142/75 (01-22-25 @ 08:00) (142/75 - 142/75)  BP(mean): --  RR: --  SpO2: --

## 2025-01-23 LAB
A1C WITH ESTIMATED AVERAGE GLUCOSE RESULT: 9.2 % — HIGH (ref 4–5.6)
ALBUMIN SERPL ELPH-MCNC: 4 G/DL — SIGNIFICANT CHANGE UP (ref 3.3–5)
ALP SERPL-CCNC: 112 U/L — SIGNIFICANT CHANGE UP (ref 40–120)
ALT FLD-CCNC: 23 U/L — SIGNIFICANT CHANGE UP (ref 4–33)
ANION GAP SERPL CALC-SCNC: 12 MMOL/L — SIGNIFICANT CHANGE UP (ref 7–14)
AST SERPL-CCNC: 28 U/L — SIGNIFICANT CHANGE UP (ref 4–32)
BASOPHILS # BLD AUTO: 0.02 K/UL — SIGNIFICANT CHANGE UP (ref 0–0.2)
BASOPHILS NFR BLD AUTO: 0.4 % — SIGNIFICANT CHANGE UP (ref 0–2)
BILIRUB SERPL-MCNC: 0.3 MG/DL — SIGNIFICANT CHANGE UP (ref 0.2–1.2)
BUN SERPL-MCNC: 15 MG/DL — SIGNIFICANT CHANGE UP (ref 7–23)
CALCIUM SERPL-MCNC: 9.7 MG/DL — SIGNIFICANT CHANGE UP (ref 8.4–10.5)
CHLORIDE SERPL-SCNC: 102 MMOL/L — SIGNIFICANT CHANGE UP (ref 98–107)
CO2 SERPL-SCNC: 26 MMOL/L — SIGNIFICANT CHANGE UP (ref 22–31)
CREAT SERPL-MCNC: 0.68 MG/DL — SIGNIFICANT CHANGE UP (ref 0.5–1.3)
EGFR: 92 ML/MIN/1.73M2 — SIGNIFICANT CHANGE UP
EOSINOPHIL # BLD AUTO: 0.13 K/UL — SIGNIFICANT CHANGE UP (ref 0–0.5)
EOSINOPHIL NFR BLD AUTO: 2.3 % — SIGNIFICANT CHANGE UP (ref 0–6)
ESTIMATED AVERAGE GLUCOSE: 217 — SIGNIFICANT CHANGE UP
GLUCOSE SERPL-MCNC: 265 MG/DL — HIGH (ref 70–99)
HCT VFR BLD CALC: 40.4 % — SIGNIFICANT CHANGE UP (ref 34.5–45)
HGB BLD-MCNC: 12.9 G/DL — SIGNIFICANT CHANGE UP (ref 11.5–15.5)
IANC: 3.63 K/UL — SIGNIFICANT CHANGE UP (ref 1.8–7.4)
IMM GRANULOCYTES NFR BLD AUTO: 0.2 % — SIGNIFICANT CHANGE UP (ref 0–0.9)
LYMPHOCYTES # BLD AUTO: 1.48 K/UL — SIGNIFICANT CHANGE UP (ref 1–3.3)
LYMPHOCYTES # BLD AUTO: 26 % — SIGNIFICANT CHANGE UP (ref 13–44)
MCHC RBC-ENTMCNC: 27.4 PG — SIGNIFICANT CHANGE UP (ref 27–34)
MCHC RBC-ENTMCNC: 31.9 G/DL — LOW (ref 32–36)
MCV RBC AUTO: 85.8 FL — SIGNIFICANT CHANGE UP (ref 80–100)
MONOCYTES # BLD AUTO: 0.42 K/UL — SIGNIFICANT CHANGE UP (ref 0–0.9)
MONOCYTES NFR BLD AUTO: 7.4 % — SIGNIFICANT CHANGE UP (ref 2–14)
NEUTROPHILS # BLD AUTO: 3.63 K/UL — SIGNIFICANT CHANGE UP (ref 1.8–7.4)
NEUTROPHILS NFR BLD AUTO: 63.7 % — SIGNIFICANT CHANGE UP (ref 43–77)
NRBC # BLD AUTO: 0 K/UL — SIGNIFICANT CHANGE UP (ref 0–0)
NRBC # BLD: 0 /100 WBCS — SIGNIFICANT CHANGE UP (ref 0–0)
NRBC # FLD: 0 K/UL — SIGNIFICANT CHANGE UP (ref 0–0)
NRBC BLD-RTO: 0 /100 WBCS — SIGNIFICANT CHANGE UP (ref 0–0)
PLATELET # BLD AUTO: 205 K/UL — SIGNIFICANT CHANGE UP (ref 150–400)
POTASSIUM SERPL-MCNC: 4.3 MMOL/L — SIGNIFICANT CHANGE UP (ref 3.5–5.3)
POTASSIUM SERPL-SCNC: 4.3 MMOL/L — SIGNIFICANT CHANGE UP (ref 3.5–5.3)
PROT SERPL-MCNC: 7.6 G/DL — SIGNIFICANT CHANGE UP (ref 6–8.3)
RBC # BLD: 4.71 M/UL — SIGNIFICANT CHANGE UP (ref 3.8–5.2)
RBC # FLD: 13.4 % — SIGNIFICANT CHANGE UP (ref 10.3–14.5)
SODIUM SERPL-SCNC: 140 MMOL/L — SIGNIFICANT CHANGE UP (ref 135–145)
TSH SERPL-MCNC: 2.42 UIU/ML — SIGNIFICANT CHANGE UP (ref 0.27–4.2)
WBC # BLD: 5.69 K/UL — SIGNIFICANT CHANGE UP (ref 3.8–10.5)
WBC # FLD AUTO: 5.69 K/UL — SIGNIFICANT CHANGE UP (ref 3.8–10.5)

## 2025-01-23 PROCEDURE — 99232 SBSQ HOSP IP/OBS MODERATE 35: CPT

## 2025-01-23 RX ORDER — ARIPIPRAZOLE 5 MG/1
10 TABLET ORAL DAILY
Refills: 0 | Status: DISCONTINUED | OUTPATIENT
Start: 2025-01-23 | End: 2025-01-26

## 2025-01-23 RX ADMIN — LEVOTHYROXINE SODIUM 75 MICROGRAM(S): 25 TABLET ORAL at 06:43

## 2025-01-23 RX ADMIN — ACETAMINOPHEN, DIPHENHYDRAMINE HCL, PHENYLEPHRINE HCL 3 MILLIGRAM(S): 325; 25; 5 TABLET ORAL at 20:19

## 2025-01-23 RX ADMIN — Medication 2 TABLET(S): at 20:20

## 2025-01-23 RX ADMIN — Medication 1000 MILLIGRAM(S): at 09:30

## 2025-01-23 RX ADMIN — Medication 50 MILLIGRAM(S): at 09:31

## 2025-01-23 RX ADMIN — Medication 1000 UNIT(S): at 09:30

## 2025-01-23 RX ADMIN — Medication 2 TABLET(S): at 20:19

## 2025-01-23 RX ADMIN — LINAGLIPTIN 5 MILLIGRAM(S): 5 TABLET, FILM COATED ORAL at 09:31

## 2025-01-23 RX ADMIN — Medication 1000 MILLIGRAM(S): at 20:20

## 2025-01-23 RX ADMIN — METFORMIN HYDROCHLORIDE 1000 MILLIGRAM(S): 1000 TABLET, COATED ORAL at 09:30

## 2025-01-23 RX ADMIN — ARIPIPRAZOLE 5 MILLIGRAM(S): 5 TABLET ORAL at 09:31

## 2025-01-23 NOTE — CHART NOTE - NSCHARTNOTEFT_GEN_A_CORE
Patient is a 74 y/o female with PMHx vacular dementia with behavioral disturbance, TERRY, Parkinson's disease, HTN, T2DM asthma, hypothyroidism, retinopathy with macular edema, GERD hx malignant neoplasm of bladder, PPHx of SCZ, Parkinson's dz, and multiple psych hospitalizations, has been hospitalized in [facility] with occasional stays at [facility] for medical concerns since the start of this year for psychosis. Awaiting transfer to [facility]. Pt has been delusional at [facility] with recent delusions surrounding pregnancy. Transferred to [facility] ED on 9/29/24 s/p unwitnessed fall. In [facility] stroke code was negative for acute pathology. Pt refused MRI and neuro deemed it not necessary. EEG did not reveal seizure activity and toxic encephalopathy workup was also negative. As per psych CL pt has been irritable, aggressive at times requiring PRN medications and restraints. Transferred back to [facility] 2S on 10/21/24. Nutrition reconsult for diet advanced s/p MBSS.    Met with patient near the dining area today. Patient appeared irritable but able to tolerate the interview. Patient reports her appetite is "okay," as per nursing staff patient with fairly good PO intake on the unit. Noted s/p MBSS on 1/21/25 and diet advanced from minced and moist to soft & bite sized diet per MD order. Explained to patient about the most current diet consistency; however, patient insisted to have regular solids such as hamburger, veggie burger, rice, also requests for almond milk, soft chicken, mac & cheese.  Per RN, patient has no chewing/swallowing difficulties on current diet at this time. Patient continues to take Glucerna shake and magic cup offered. Patient requested writer for a cup of diet ginger ale and diet cranberry juice which writer provided to her at the end of the interview, observed patient drank them without difficulty. c/w Multivitamin and vitamin D3 as per MD order. Noted patient has been refusing vitals and fingersticks. c/w consistent carbohydrate diet which remains appropriate at this time. Unable to provide diet education to patient today.    Diet : Diet, Soft and Bite Sized:   Consistent Carbohydrate {Evening Snack} (CSTCHOSN)  Supplement Feeding Modality:  Oral  Glucerna Shake Cans or Servings Per Day:  2       Frequency:  Two Times a day (01-21-25 @ 14:39) [Active]    Per chart, patient with suspected allergy to salmon (patient claims salmon makes asthma worse).    Patient reports [ ] nausea  [ ] vomiting [ ] diarrhea [ ] constipation  [ ] chewing problems [ ] swallowing issues  [X] other: No GI distress reported at this time    PO intake:  [ ] < 50%  [X] 50-75%  [ ] %  [ ] other :      Height: 154.9cm  Weight: 71.2kg (10/21/24), 78kg (12/7/24), 79.8kg (12/28/24), 75.3kg (1/18/25)  -- overall gained 5.8% x past 3 months, wt fluctuation possibly related to her po intake  BMI: 31.4 kg/m^2 (1/18/25)    Skin: no pressure injuries     Edema: no edema    Pertinent Medications: MEDICATIONS  (STANDING):  ARIPiprazole 5 milliGRAM(s) Oral daily  cholecalciferol 1000 Unit(s) Oral daily  divalproex Sprinkle 1000 milliGRAM(s) Oral two times a day  glucagon  Injectable 1 milliGRAM(s) IntraMuscular once  insulin lispro (ADMELOG) corrective regimen sliding scale   SubCutaneous at bedtime  insulin lispro (ADMELOG) corrective regimen sliding scale   SubCutaneous three times a day before meals  levothyroxine 75 MICROGram(s) Oral daily  linagliptin 5 milliGRAM(s) Oral daily  melatonin. 3 milliGRAM(s) Oral at bedtime  metFORMIN 1000 milliGRAM(s) Oral two times a day with meals  metoprolol succinate ER 50 milliGRAM(s) Oral daily  multivitamin 2 Tablet(s) Oral at bedtime  senna 2 Tablet(s) Oral at bedtime    MEDICATIONS  (PRN):  acetaminophen     Tablet .. 650 milliGRAM(s) Oral every 6 hours PRN Mild Pain (1 - 3)  albuterol    90 MICROgram(s) HFA Inhaler 2 Puff(s) Inhalation every 6 hours PRN Bronchospasm  aluminum hydroxide/magnesium hydroxide/simethicone Suspension 30 milliLiter(s) Oral every 4 hours PRN Dyspepsia  bisacodyl 5 milliGRAM(s) Oral daily PRN Constipation  dextrose Oral Gel 15 Gram(s) Oral once PRN Blood Glucose LESS THAN 70 milliGRAM(s)/deciliter  dextrose Oral Gel 15 Gram(s) Oral once PRN hypoglycemia  diphenhydrAMINE Elixir 12.5 milliGRAM(s) Oral every 6 hours PRN eps prevention  diphenhydrAMINE Injectable 25 milliGRAM(s) IntraMuscular once PRN EPS prevention  fluPHENAZine 5 milliGRAM(s) Oral every 6 hours PRN agitation  fluPHENAZine  Injectable 5 milliGRAM(s) IntraMuscular once PRN agitation  polyethylene glycol 3350 17 Gram(s) Oral daily PRN constipation    Pertinent Labs:       01-23    140  |  102  |  15  ----------------------------<  265[H]  4.3   |  26  |  0.68    Ca    9.7      23 Jan 2025 08:55    TPro  7.6  /  Alb  4.0  /  TBili  0.3  /  DBili  x   /  AST  28  /  ALT  23  /  AlkPhos  112  01-23    HbA1c: A1C with Estimated Average Glucose Result: 9.2 % (01-23-25 @ 08:55)    POCT Blood Glucose.: 201: RN Notified Readback mg/dL (01.15.25 @ 20:39)   POCT Blood Glucose.: 286: RN Notified Readback mg/dL (12.30.24 @ 11:56)         Estimated Needs:   [X] no change since previous assessment  [ ] recalculated:       Previous Nutrition Diagnosis: Moderate malnutrition    Nutrition Diagnosis is [X] ongoing  [ ] resolved [ ] not applicable        New Nutrition Diagnosis: [X] not applicable        Recommendations:  1. Continue current diet order per MD.   2. Defer diet consistency to SLP's rec/MD order. Please consult to SLP for swallow eval if patient no longer tolerated current diet consistency. Aspiration and reflux precautions.   3. c/w Glucerna Therapeutic Nutrition Shake 2x daily (440kcals, 20g protein), and Magic cup (reduced sugar) 2x daily (580kcal, 18gm protein).  4. c/w Multivitamin and vitamin D3 per MD order.  5. Encourage adequate po intake as tolerated and honor food preferences PRN.  6. Monitor PO intake/tolerance, weights, labs, BM's, and skin integrity.     -- Randal Gilbert, MS, RDN, CDN, Pager # [phone]
Reviewed FS's, also, patient does not seem to be taking metformin consistently, discussed case with Primary Team  Will try with another oral agent along with metformin and see if patient will be amenable to take it, resumed linagliptin 5mg QD, metformin is to be continued as well. All labs (including repeat A1C, CBC/BMP) have been reordered for tomorrow as pt refused to have them done today.  Monitor FS, if consistently >400 should be sent to ED for insulin administration over objection and labs to ensure no DKA is developing. Currently patient has the right to refuse while at [facility] unless a medical emergency.  Continue w/ carb consistent diet and JEEVAN  Discussed at length with Primary Team.
Patient is a 74 y/o female with PMHx vacular dementia with behavioral disturbance, TERRY, Parkinson's disease, HTN, T2DM asthma, hypothyroidism, retinopathy with macular edema, GERD hx malignant neoplasm of bladder, PPHx of SCZ, Parkinson's dz, and multiple psych hospitalizations, has been hospitalized in [facility] with occasional stays at [facility] for medical concerns since the start of this year for psychosis. Awaiting transfer to [facility]. Pt has been delusional at [facility] with recent delusions surrounding pregnancy. Transferred to [facility] ED on 9/29/24 s/p unwitnessed fall. In [facility] stroke code was negative for acute pathology. Pt refused MRI and neuro deemed it not necessary. EEG did not reveal seizure activity and toxic encephalopathy workup was also negative. As per psych CL pt has been irritable, aggressive at times requiring PRN medications and restraints. Transferred back to [facility] 2S on 10/21/24. Nutrition reconsult for assessment.    Limited information obtained from patient today due to irritation and pt is a poor historian. Patient reports her appetite is "good" during encounter this morning. As per RN and staff, patient has been eating somewhat better at meals on the unit but has been complaining about not liking the Minced and Moist diet consistency, frequently requesting for regular solids despite explained to patient multiple times about the most recent MBS result and SLP's recommendation. No chewing/swallowing difficulties on current diet reported at this time. Patient has been taking Glucerna shake and magic cup as per nursing staff. Patient requests writer to send her blueberry yogurt, honored on Cboard. c/w Multivitamin and vitamin D3 as per MD order. Of note, patient has been partially compliant with medications including metformin; given patient with improving po intake, may benefit from adding consistent carb restriction to her current diet for better glycemic control.    Diet : Diet, Regular:   DASH/TLC {Sodium & Cholesterol Restricted} (DASH)  Minced and Moist (MINCEDMOIST)  Supplement Feeding Modality:  Oral  Glucerna Shake Cans or Servings Per Day:  2       Frequency:  Daily (10-23-24 @ 16:23) [Active]    Per chart, patient with suspected allergy to salmon (patient claims salmon makes asthma worse).    Patient reports [ ] nausea  [ ] vomiting [ ] diarrhea [ ] constipation  [ ] chewing problems [ ] swallowing issues  [X] other: No GI distress reported at this time    PO intake:  [ ] < 50%  [X] 50-75%  [ ] %  [ ] other :      Height: 154.9cm  Weight: 157lb/71.2kg (10/21)  -- no new wt to reassess at this time  BMI: 29.6 (10/21)    Skin: no pressure injuries     Edema: no edema    Pertinent Medications: MEDICATIONS  (STANDING):  cholecalciferol 1000 Unit(s) Oral daily  divalproex Sprinkle 1000 milliGRAM(s) Oral two times a day  glucagon  Injectable 1 milliGRAM(s) IntraMuscular once  levothyroxine 75 MICROGram(s) Oral daily  melatonin. 6 milliGRAM(s) Oral at bedtime  metFORMIN 1000 milliGRAM(s) Oral two times a day with meals  metoprolol succinate ER 50 milliGRAM(s) Oral daily  multivitamin 1 Tablet(s) Oral at bedtime  QUEtiapine 200 milliGRAM(s) Oral at bedtime  senna 2 Tablet(s) Oral at bedtime    MEDICATIONS  (PRN):  acetaminophen     Tablet .. 650 milliGRAM(s) Oral every 6 hours PRN Mild Pain (1 - 3)  albuterol    90 MICROgram(s) HFA Inhaler 2 Puff(s) Inhalation every 6 hours PRN Bronchospasm  aluminum hydroxide/magnesium hydroxide/simethicone Suspension 30 milliLiter(s) Oral every 4 hours PRN Dyspepsia  bisacodyl 5 milliGRAM(s) Oral daily PRN Constipation  dextrose Oral Gel 15 Gram(s) Oral once PRN Blood Glucose LESS THAN 70 milliGRAM(s)/deciliter  dextrose Oral Gel 15 Gram(s) Oral once PRN hypoglycemia  diphenhydrAMINE Elixir 12.5 milliGRAM(s) Oral every 6 hours PRN eps prevention  diphenhydrAMINE Injectable 12.5 milliGRAM(s) IntraMuscular once PRN EPS prevention  diphenhydrAMINE Injectable 12.5 milliGRAM(s) IntraMuscular once PRN EPS prevention  fluPHENAZine 5 milliGRAM(s) Oral every 6 hours PRN agitation  fluPHENAZine  Injectable 5 milliGRAM(s) IntraMuscular once PRN agitation  fluPHENAZine  Injectable 5 milliGRAM(s) IntraMuscular once PRN agitation  polyethylene glycol 3350 17 Gram(s) Oral daily PRN constipation    Pertinent Labs:      HbA1c: A1C with Estimated Average Glucose Result: 7.3 % (09-29-24 @ 17:15)      CAPILLARY BLOOD GLUCOSE    POCT Blood Glucose.: 228: RN Notified Readback mg/dL (12.02.24 @ 15:41)   POCT Blood Glucose.: 168: RN Notified Readback mg/dL (11.29.24 @ 07:55)   POCT Blood Glucose.: 291: RN Notified Readback mg/dL (11.26.24 @ 15:52)         Estimated Needs:   [X] no change since previous assessment  [ ] recalculated:       Previous Nutrition Diagnosis: Moderate malnutrition    Nutrition Diagnosis is [X] ongoing  [ ] resolved [ ] not applicable        New Nutrition Diagnosis: [X] not applicable        Recommendations:  1. Defer diet consistency to SLP's rec/MD order. Please consult to SLP for swallow eval if patient no longer tolerated current diet consistency. Aspiration and reflux precautions.   2. Suggest adding Consistent carbohydrate diet restriction to her current diet order.  3. c/w Glucerna Therapeutic Nutrition Shake 240mls 2x daily (440kcals, 20g protein), and Magic cup (reduced sugar) 2x daily (580kcal, 18gm protein).  4. c/w Multivitamin and vitamin D3 per MD order.  5. Encourage po intake as tolerated and honor food preferences PRN.  6. Monitor PO intake/tolerance, weights, labs, BM's, and skin integrity.   7. Please obtain weekly weight for reassessment.    -- Randal Gilbert, MS, RDN, CDN, Pager # [phone]

## 2025-01-23 NOTE — BH INPATIENT PSYCHIATRY PROGRESS NOTE - NSBHMETABOLIC_PSY_ALL_CORE_FT
BMI: BMI (kg/m2): 29.7 (10-21-24 @ 16:58)  HbA1c: A1C with Estimated Average Glucose Result: 9.2 % (01-23-25 @ 08:55)    Glucose: POCT Blood Glucose.: 201 mg/dL (01-15-25 @ 20:39)    BP: 111/78 (01-23-25 @ 07:29) (111/78 - 142/75)Vital Signs Last 24 Hrs  T(C): 36.4 (01-23-25 @ 07:29), Max: 36.4 (01-23-25 @ 07:29)  T(F): 97.5 (01-23-25 @ 07:29), Max: 97.5 (01-23-25 @ 07:29)  HR: 83 (01-23-25 @ 07:29) (83 - 83)  BP: 111/78 (01-23-25 @ 07:29) (111/78 - 111/78)  BP(mean): --  RR: --  SpO2: --      Lipid Panel: Date/Time: 09-30-24 @ 04:45  Cholesterol, Serum: 148  LDL Cholesterol Calculated: 64  HDL Cholesterol, Serum: 56  Total Cholesterol/HDL Ration Measurement: --  Triglycerides, Serum: 138

## 2025-01-23 NOTE — BH INPATIENT PSYCHIATRY PROGRESS NOTE - NSBHCHARTREVIEWVS_PSY_A_CORE FT
Vital Signs Last 24 Hrs  T(C): 36.4 (01-23-25 @ 07:29), Max: 36.4 (01-23-25 @ 07:29)  T(F): 97.5 (01-23-25 @ 07:29), Max: 97.5 (01-23-25 @ 07:29)  HR: 83 (01-23-25 @ 07:29) (83 - 83)  BP: 111/78 (01-23-25 @ 07:29) (111/78 - 111/78)  BP(mean): --  RR: --  SpO2: --
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
## 2025-01-23 NOTE — CHART NOTE - NSCHARTNOTESELECT_GEN_ALL_CORE
Nutrition reconsult/Nutrition Services
Hospitalist f/up/Event Note
Nutrition re-consult/Nutrition Services
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
## 2025-01-23 NOTE — BH INPATIENT PSYCHIATRY PROGRESS NOTE - CURRENT MEDICATION
MEDICATIONS  (STANDING):  ARIPiprazole 10 milliGRAM(s) Oral daily  cholecalciferol 1000 Unit(s) Oral daily  divalproex Sprinkle 1000 milliGRAM(s) Oral two times a day  glucagon  Injectable 1 milliGRAM(s) IntraMuscular once  insulin lispro (ADMELOG) corrective regimen sliding scale   SubCutaneous at bedtime  insulin lispro (ADMELOG) corrective regimen sliding scale   SubCutaneous three times a day before meals  levothyroxine 75 MICROGram(s) Oral daily  linagliptin 5 milliGRAM(s) Oral daily  melatonin. 3 milliGRAM(s) Oral at bedtime  metFORMIN 1000 milliGRAM(s) Oral two times a day with meals  metoprolol succinate ER 50 milliGRAM(s) Oral daily  multivitamin 2 Tablet(s) Oral at bedtime  senna 2 Tablet(s) Oral at bedtime    MEDICATIONS  (PRN):  acetaminophen     Tablet .. 650 milliGRAM(s) Oral every 6 hours PRN Mild Pain (1 - 3)  albuterol    90 MICROgram(s) HFA Inhaler 2 Puff(s) Inhalation every 6 hours PRN Bronchospasm  aluminum hydroxide/magnesium hydroxide/simethicone Suspension 30 milliLiter(s) Oral every 4 hours PRN Dyspepsia  bisacodyl 5 milliGRAM(s) Oral daily PRN Constipation  dextrose Oral Gel 15 Gram(s) Oral once PRN Blood Glucose LESS THAN 70 milliGRAM(s)/deciliter  dextrose Oral Gel 15 Gram(s) Oral once PRN hypoglycemia  diphenhydrAMINE Elixir 12.5 milliGRAM(s) Oral every 6 hours PRN eps prevention  diphenhydrAMINE Injectable 25 milliGRAM(s) IntraMuscular once PRN EPS prevention  fluPHENAZine 5 milliGRAM(s) Oral every 6 hours PRN agitation  fluPHENAZine  Injectable 5 milliGRAM(s) IntraMuscular once PRN agitation  polyethylene glycol 3350 17 Gram(s) Oral daily PRN constipation

## 2025-01-24 PROCEDURE — 99232 SBSQ HOSP IP/OBS MODERATE 35: CPT

## 2025-01-24 RX ADMIN — LEVOTHYROXINE SODIUM 75 MICROGRAM(S): 25 TABLET ORAL at 06:11

## 2025-01-24 RX ADMIN — METFORMIN HYDROCHLORIDE 1000 MILLIGRAM(S): 1000 TABLET, COATED ORAL at 10:52

## 2025-01-24 RX ADMIN — ARIPIPRAZOLE 10 MILLIGRAM(S): 5 TABLET ORAL at 10:52

## 2025-01-24 RX ADMIN — LINAGLIPTIN 5 MILLIGRAM(S): 5 TABLET, FILM COATED ORAL at 10:53

## 2025-01-24 NOTE — BH INPATIENT PSYCHIATRY PROGRESS NOTE - NSBHFUPINTERVALHXFT_PSY_A_CORE
Patient is seen and evaluated for follow up for psychosis.  Chart is reviewed and case is discussed with nursing team.  No further incidents.   labs showed drift up in A1c Took all her meds last night and this AM. . Had cine wherein speech recommended advancing diet to soft and bite sized thin fluids. Patient with improved compliance overall took all meds except VPA, refused vitals

## 2025-01-24 NOTE — BH INPATIENT PSYCHIATRY PROGRESS NOTE - NSBHMETABOLIC_PSY_ALL_CORE_FT
BMI: BMI (kg/m2): 29.7 (10-21-24 @ 16:58)  HbA1c: A1C with Estimated Average Glucose Result: 9.2 % (01-23-25 @ 08:55)    Glucose: POCT Blood Glucose.: 201 mg/dL (01-15-25 @ 20:39)    BP: 111/78 (01-23-25 @ 07:29) (111/78 - 142/75)Vital Signs Last 24 Hrs  T(C): --  T(F): --  HR: --  BP: --  BP(mean): --  RR: --  SpO2: --      Lipid Panel: Date/Time: 09-30-24 @ 04:45  Cholesterol, Serum: 148  LDL Cholesterol Calculated: 64  HDL Cholesterol, Serum: 56  Total Cholesterol/HDL Ration Measurement: --  Triglycerides, Serum: 138
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
## 2025-01-25 PROCEDURE — 99232 SBSQ HOSP IP/OBS MODERATE 35: CPT

## 2025-01-25 RX ADMIN — LINAGLIPTIN 5 MILLIGRAM(S): 5 TABLET, FILM COATED ORAL at 09:03

## 2025-01-25 RX ADMIN — Medication 50 MILLIGRAM(S): at 09:03

## 2025-01-25 RX ADMIN — ACETAMINOPHEN, DIPHENHYDRAMINE HCL, PHENYLEPHRINE HCL 3 MILLIGRAM(S): 325; 25; 5 TABLET ORAL at 21:31

## 2025-01-25 RX ADMIN — ARIPIPRAZOLE 10 MILLIGRAM(S): 5 TABLET ORAL at 09:03

## 2025-01-25 RX ADMIN — Medication 2 TABLET(S): at 21:31

## 2025-01-25 RX ADMIN — METFORMIN HYDROCHLORIDE 1000 MILLIGRAM(S): 1000 TABLET, COATED ORAL at 09:04

## 2025-01-25 RX ADMIN — Medication 1000 MILLIGRAM(S): at 21:30

## 2025-01-25 RX ADMIN — Medication 1000 UNIT(S): at 09:03

## 2025-01-25 RX ADMIN — Medication 1000 MILLIGRAM(S): at 09:03

## 2025-01-25 NOTE — BH INPATIENT PSYCHIATRY PROGRESS NOTE - NSBHASSESSSUMMFT_PSY_ALL_CORE
Ms Azul is a 72 yo F w history of SCZ, Parkinson's dz, hypothyroidism, and multiple psych hospitalizations who presents for AMS. Pt transferred from [facility], where she was found to have fallen and was covered in urine. Pt has been hospitalized in [facility] with occasional stays at [facility] for medical concerns since the start of this year for psychosis. Per [facility] notes, pt appears to be awaiting transfer to [facility] hospital. Pt has been delusional at [facility] with recent delusions surrounding pregnancy. Current medications are Depakote 750 mg bid, Seroquel 50 mg bid, and Prolixin Dec 12.5 mg IM q2w (last dose 9/24).     In [facility] stroke code was negative for acute pathology. Pt refused MRI and neuro deemed it not necessary. EEG did not reveal seizure activity and toxic encephalopathy workup was also negative. Psychiatry continued Depakote 1000mg BID, Seroquel 75mg QHS and Prolixin Dec 12.5 mg IM q2w (last dose given on 10/9), next dose due 10/23/24. As per psych CL pt has been irritable, aggressive at times requiring PRN medications and restraints. Paranoid and disorganized.     On arrival to  pt continues to present paranoid delusions, mentions she was poisoned by nurses at [facility], continues to endorse delusions of pregnancy. Denies SI/HI. Requires inpatient level of care due to inability to care for herself or transition to outpatient level of care given severe episodes of agitation, aggression and disorganized behavior in the context of psychosis.    10/22 Clinical Update: Ms. Azul refused to have her vital signs taken this morning. No behavioral outbursts or aggression reported. Bloodwork reviewed and WNL. Valproic acid level within therapeutic range at 65.70. Pt is visible in the dayroom today. Seems sedated, dozing off during interview. May need Seroquel dose to be adjusted to minimize daytime sedation. Due for Prolixin dec tomorrow.    10/23: Refused VS, irritable on approach, will offer Prolixin Dec tomorrow if more agreeable.  Did take PO meds.    10/24: Episodic agitation, did require IM prn last night.  Refused SHAFER and declined AM meds.  10/25: Remains irritable, refusing meds and VS, declines SHAFER, will likely require MOO.  10/28 Patient psychotic tenuous will increase Seroquel request transfer hearing start TOO, change prn to Prolixin Benadryl as never has had much response to olanzapine  10/29 Somewhat calmer today possibly from prn and taking Seroquel last night but refused BP meds, and Depakote which may affect medical condition encourage compliance placed MOO paperwork  10/30 Selectively compliant with medications. Remains illogical, paranoid, uncooperative with care  10/31 Psychotic disorganized needing prns taking medical meds part of time. Cont meds plan TOO hearing  11/1 Calmer at moment but quite psychotic, compliance with medical and psych meds very erratic Cont meds and plan for TOO hearing  11/2: Noncompliant with medications, uncooperative with unit rules and ADL care.   11/3: Remains paranoid  11/4 Agitated tenuous poorly compliant will go to court for TOO, will move Seroquel to all PM at her request with hope this may improve compliance  11/5 Somewhat calmer today no prns, very psychotic, erratic compliance. Will offer decanoate today  she is due, plan court for TOO in AM.  11/6 Patient psychotic, not much change will give Prolixin  18.75mg today, Cont to titrate Seroquel but only hs at her request.   11/7 Patient irritable, aggressive delusional. Cont decanoate along with increasing Seroquel and using prns , await state transfer  11/8 Patient irritable more negativistic today do not feel though she has catatonic mutism as she is talking to other staff and peers other than writer. Cont to titrate Seroquel if she is taking regularly. If she refuses Depakote regularly may need to eval for transfer for IV anti convulsants.  11/9 guarded, easily irritable but more visible, no acute events overnight/this am, partially compliant with medical meds. No SI/HI reported.   11/12 guarded, uncooperative, disorganized and irritable, partially cooperative with meds, refuses vitals.   11/13 needed PRN IM and restraints for agitation, physical aggression, calmer later on. Remains delusional, disorganized and labile, partially compliant with medications.  11/14 no overnight events, no PRN needed, refused am meds, complied with hs seroquel. Remains guarded, uncooperative and dismissive, no suicidal/homicidal behavior.   11/15 no acute outbursts, no PRN needed. She does not think seroquel is helpful, only partially compliant with medications, no SI/HI. She spoke to  about abilify, to review her past medications trials.  11/18 Cont current meds including Shafer when due will re-eval Seroquel though other options limited will get ua and if patient will allow will add prn laxatives  11/19 Psychotic not much change level of agitation fluctuates while she regularly expresses paranoia and delusions of pregnancy. Cont current meds encourage compliant encourage allowing assist with ADL's. UA and C and S re-ordered  11/20 Not much change cont resistive to help with hygiene, delusional, irritable. Expressing willingness to try Abilify to [facility], will explore with patient  11/21 Patient calmer today possibly related to prn. Patient expressing willingness to try Abilify but based on past she rarely follows through. Cont current meds for now. U/A neg  no evidence UTI Incontinence likely mix of anatomical and behavioral factors  11/22: More subdued possibly some incremental benefit from regular dosing of decanoate. Will ocntinue will cont Seroquel rather than make changes when patient showing some gains, Will sl increase Seroquel as she appeared in past to benefit from 300mg/d  11/25 Modest gains overall with ongoing epsiodic agitation. Increase Seroquel give next dec of Prolixin at week 3. Ordered podiatry clinic.   11/26 Some gains with administrsation of dec next due 11/29. Plan further titration Seroquel  11/27 Modst gains. Next dec 11/29, plan further titration Seroquel  11/29 Remains with refractory illness. Cont meds, for dec today, plan further titration of Seroquel  12/2 Patient without much change, psychotic, irritable, tenuous at times. Cont treatment consider increasing Seroquel. Will ask PT to re-eval walker generally viewed as not appropriate as she may use it in unsafe way.  12/3 Patient calmer with SHAFER. Cont current meds consider increasing Seroquel. ordered dental consult. Will consider speech consult with hope advancing diet  12/4 Patient w/o much changed, scheduled for dental appointment. Not considered safe to give walker due to risk of aggression Will get labs in AM on routine   basis  12/5 Patient with some gains. Will increase Seroquel will have her seen in dental tomorrow. reviewed plan of care with sister  12/6 Patient improved modestly cont current meds plan to titrate Seroquel consider making Prolixin 25mg monthly to decrease number of injections can return to dental if agrees to extraction or any deterioration occurs  12/9 Patient partially improved cont current meds will increase dose of dec next shot to see if can give q4 weeks  12/10 Modest gains. Cont meds consider increasing Seroquel. Ordered cine to see if feasible to advance diet  12/11 Patient agitated and revealing new delusions. Plan cont to titrate Seroquel and Prolixin. will hold off on rescheduling cine to avoid inadvertently provoking her. Will lower melatonin as she blames this for sedating her leading t missed appointment  12/12 Paranoid, somatic cont current meds.  12/13 Labile, with periods of agitation. Continue current medications   12/16 Not much change cont current meds will increase fluphenazine next shot. Patient refusing to go for cine  12/17 No major changes patient refusing Depakote about half the time, if continues will discuss with neuro what options are available  12/18 Possibly more psychotic, will increase fluphenazine dec next dose.   12/19 More delusional plan increase next dec dose. Will not try at this time givwe acute IM as per court order as leads to increased agitation more than clear benefit. MAy need to return to court for order to give Insulin per court order  12/20 Irritable, angry with receiving SHAFER. Have increase Prolixin to 25mg. Will change metformin Vladislav alfonso hope she will accept a diffenent DM med that is only once a day otherwise may need to pursue TOO  12/23 Calmer since last Shafer dose still delusional. Cont curren t meds except she is willing to go back to metformin will restart and dc Trajenta  12/24 Patient calmer still irritable delusional thought disordered. FS elevated but she is now beginning to resume compliance with metformin which has been effective in fast. Cont current meds for now.   12/25: Calm but remains delusional and thought disordered   12/26 Calmer but psychotic, poorly compliant Cont meds, eval need to consider court fro diabetic treatment over objection  12/27 Less severely agitated since last SHAFER dose.Cont to encourage compliance with PO meds especially given absence of viable IM alternatives but may need to consider TOO for diabetic meds if situation worsens  12/30. Very psychotic though less tenuous and agitated. Will taper off Seroquel to minimize contributions to poor diabetic control. Added Trajenta to metformin after discussion with medicine. Will hold off on court for DM meds to see how she does with above interventions  12/31 Patient very psychotic, aggressive. Cont current meds, consider increase Prolixin next shot or moving up dejon of injection. Consider alternates to Seroquel given sugars high and she is uncooperative with diabetic meds. Family informed of restraint  1/1: Remains intermittently agitated, aggressive, today disorganized but not aggressive, able to be redirected   1/2 Patient less agitated but paranoid, irritable, delusional about pregnancy, refusing most po meds. Reviewed with medicine, no clinical emergency will cont to encourage compliance. Note refusing Depakote but last EEG 9/30 neg for epileptiform activity Will d/c Seroquel as refusing and may increase glucose and will offer Abilify.   1/3 Pt has periods of irritability, refusing FS and vitals though seen active in day area, disorganized. Took dose of Abilify yesterday though nonadherent today.  1/4-1/5: Agitated and disorganized on 1/4 throwing things, pouring drink on her head, sitting in her urine and refusing to be cleaned, requires prn meds but better on 1/5 1/6 Patient refusing all po meds, having periods of agitation, cont to encourage compliance, consider moving up next dec and or increasing dose. Med refuses remain non emergent. Will consider TOO for whatever meds can be given parenterally  1/7 Patient grossly psychotic, no severe agitation/aggression. Refusing po meds. Feel risk benefit of giving IM antipsychotic is poor as the process results in extreme agitation/agression, need for restraints with only brief calming effect. Similar right now risk benefit of getting court order for diabetic meds is not favorable but can be re-evaluated  1/8 No major changes. Level of agitation irritability tend to be variable. Cont current meds w/o change  1/9 No major changes. Cont meds encourage compliance give dec 1/13 1/13: Patient showing some improvement, less agitation, received Prolixin Dec 25mg SHAFER today on 1/13, awaiting state hospital bed.  1/14: No interval changes, tolerated Prolixin Dec with no side effects.  Pending state hospital transfer.  1/15: No changes, irritability noted but less agitation and received SHAFER on 1/13.  1/16: No major changes. Level of agitation irritability tend to be variable. Cont current meds w/o change, did receive SHAFER on 1/13/25.  1/17: No interval changes, barium swallow study ordered and scheduled for 1/21 at 9am to possibly advance diet, no med changes.  1/21 SL calmer, provocative at times with peers and staff. Will advance diet will cont current meds  1/22 Calmer recently of uncertain significance. Cont meds consider again court for DM meds and synthroid will check TSH  1/23 Aggressive towards impaired peer who touched her bible. Will make efforts to keep then , will increase Abilify given she is more compliant  1/24 less psychotic cont meds, encourage compliance, spoke with sister who will also encourage compliance via phone  1/25 Modest gains in psychosis, more compliant cont current treatment. Cont to titrate Abilify if compliant then consider SHAFER  Plan:  -[facility] under [facility] legal status  -Routine checks  -Bed block  -Continue Depakote 1000mg BID  -Continue Seroquel 150 mg QHS  -Prolixin Dec 18.75 mg received on 11/6/24.  -Neuro workup at [facility] negative for seizure activity or acute stroke

## 2025-01-25 NOTE — BH INPATIENT PSYCHIATRY PROGRESS NOTE - NSBHFUPINTERVALHXFT_PSY_A_CORE
Patient is seen and evaluated for follow up for psychosis.  Chart is reviewed and case is discussed with nursing team. Patient refused vitals but took all meds. Patient verbally abusive with staff peers.

## 2025-01-25 NOTE — BH INPATIENT PSYCHIATRY PROGRESS NOTE - NSBHMETABOLIC_PSY_ALL_CORE_FT
BMI: BMI (kg/m2): 29.7 (10-21-24 @ 16:58)  HbA1c: A1C with Estimated Average Glucose Result: 9.2 % (01-23-25 @ 08:55)    Glucose: POCT Blood Glucose.: 201 mg/dL (01-15-25 @ 20:39)    BP: 111/78 (01-23-25 @ 07:29) (111/78 - 111/78)Vital Signs Last 24 Hrs  T(C): --  T(F): --  HR: --  BP: --  BP(mean): --  RR: --  SpO2: --      Lipid Panel: Date/Time: 09-30-24 @ 04:45  Cholesterol, Serum: 148  LDL Cholesterol Calculated: 64  HDL Cholesterol, Serum: 56  Total Cholesterol/HDL Ration Measurement: --  Triglycerides, Serum: 138

## 2025-01-26 PROCEDURE — 99232 SBSQ HOSP IP/OBS MODERATE 35: CPT

## 2025-01-26 RX ORDER — ARIPIPRAZOLE 5 MG/1
15 TABLET ORAL DAILY
Refills: 0 | Status: DISCONTINUED | OUTPATIENT
Start: 2025-01-26 | End: 2025-01-29

## 2025-01-26 RX ADMIN — ACETAMINOPHEN 650 MILLIGRAM(S): 160 SUSPENSION ORAL at 21:45

## 2025-01-26 RX ADMIN — ACETAMINOPHEN 650 MILLIGRAM(S): 160 SUSPENSION ORAL at 22:59

## 2025-01-26 RX ADMIN — Medication 2 TABLET(S): at 21:45

## 2025-01-26 RX ADMIN — Medication 5 MILLIGRAM(S): at 21:46

## 2025-01-26 RX ADMIN — Medication 1000 MILLIGRAM(S): at 21:45

## 2025-01-26 RX ADMIN — ACETAMINOPHEN, DIPHENHYDRAMINE HCL, PHENYLEPHRINE HCL 3 MILLIGRAM(S): 325; 25; 5 TABLET ORAL at 21:46

## 2025-01-26 NOTE — BH INPATIENT PSYCHIATRY PROGRESS NOTE - NSBHMETABOLIC_PSY_ALL_CORE_FT
BMI: BMI (kg/m2): 29.7 (10-21-24 @ 16:58)  HbA1c: A1C with Estimated Average Glucose Result: 9.2 % (01-23-25 @ 08:55)    Glucose: POCT Blood Glucose.: 201 mg/dL (01-15-25 @ 20:39)    BP: --Vital Signs Last 24 Hrs  T(C): 36.4 (01-26-25 @ 07:44), Max: 36.4 (01-26-25 @ 07:44)  T(F): 97.5 (01-26-25 @ 07:44), Max: 97.5 (01-26-25 @ 07:44)  HR: --  BP: --  BP(mean): --  RR: --  SpO2: --    Orthostatic VS  01-26-25 @ 07:44  Lying BP: --/-- HR: --  Sitting BP: 141/90 HR: 82  Standing BP: 138/88 HR: 84  Site: --  Mode: --    Lipid Panel: Date/Time: 09-30-24 @ 04:45  Cholesterol, Serum: 148  LDL Cholesterol Calculated: 64  HDL Cholesterol, Serum: 56  Total Cholesterol/HDL Ration Measurement: --  Triglycerides, Serum: 138

## 2025-01-26 NOTE — BH INPATIENT PSYCHIATRY PROGRESS NOTE - NSBHASSESSSUMMFT_PSY_ALL_CORE
Ms Azul is a 74 yo F w history of SCZ, Parkinson's dz, hypothyroidism, and multiple psych hospitalizations who presents for AMS. Pt transferred from [facility], where she was found to have fallen and was covered in urine. Pt has been hospitalized in [facility] with occasional stays at [facility] for medical concerns since the start of this year for psychosis. Per [facility] notes, pt appears to be awaiting transfer to [facility] hospital. Pt has been delusional at [facility] with recent delusions surrounding pregnancy. Current medications are Depakote 750 mg bid, Seroquel 50 mg bid, and Prolixin Dec 12.5 mg IM q2w (last dose 9/24).     In [facility] stroke code was negative for acute pathology. Pt refused MRI and neuro deemed it not necessary. EEG did not reveal seizure activity and toxic encephalopathy workup was also negative. Psychiatry continued Depakote 1000mg BID, Seroquel 75mg QHS and Prolixin Dec 12.5 mg IM q2w (last dose given on 10/9), next dose due 10/23/24. As per psych CL pt has been irritable, aggressive at times requiring PRN medications and restraints. Paranoid and disorganized.     On arrival to  pt continues to present paranoid delusions, mentions she was poisoned by nurses at [facility], continues to endorse delusions of pregnancy. Denies SI/HI. Requires inpatient level of care due to inability to care for herself or transition to outpatient level of care given severe episodes of agitation, aggression and disorganized behavior in the context of psychosis.    10/22 Clinical Update: Ms. Azul refused to have her vital signs taken this morning. No behavioral outbursts or aggression reported. Bloodwork reviewed and WNL. Valproic acid level within therapeutic range at 65.70. Pt is visible in the dayroom today. Seems sedated, dozing off during interview. May need Seroquel dose to be adjusted to minimize daytime sedation. Due for Prolixin dec tomorrow.    10/23: Refused VS, irritable on approach, will offer Prolixin Dec tomorrow if more agreeable.  Did take PO meds.    10/24: Episodic agitation, did require IM prn last night.  Refused SHAFER and declined AM meds.  10/25: Remains irritable, refusing meds and VS, declines SHAFER, will likely require MOO.  10/28 Patient psychotic tenuous will increase Seroquel request transfer hearing start TOO, change prn to Prolixin Benadryl as never has had much response to olanzapine  10/29 Somewhat calmer today possibly from prn and taking Seroquel last night but refused BP meds, and Depakote which may affect medical condition encourage compliance placed MOO paperwork  10/30 Selectively compliant with medications. Remains illogical, paranoid, uncooperative with care  10/31 Psychotic disorganized needing prns taking medical meds part of time. Cont meds plan TOO hearing  11/1 Calmer at moment but quite psychotic, compliance with medical and psych meds very erratic Cont meds and plan for TOO hearing  11/2: Noncompliant with medications, uncooperative with unit rules and ADL care.   11/3: Remains paranoid  11/4 Agitated tenuous poorly compliant will go to court for TOO, will move Seroquel to all PM at her request with hope this may improve compliance  11/5 Somewhat calmer today no prns, very psychotic, erratic compliance. Will offer decanoate today  she is due, plan court for TOO in AM.  11/6 Patient psychotic, not much change will give Prolixin  18.75mg today, Cont to titrate Seroquel but only hs at her request.   11/7 Patient irritable, aggressive delusional. Cont decanoate along with increasing Seroquel and using prns , await state transfer  11/8 Patient irritable more negativistic today do not feel though she has catatonic mutism as she is talking to other staff and peers other than writer. Cont to titrate Seroquel if she is taking regularly. If she refuses Depakote regularly may need to eval for transfer for IV anti convulsants.  11/9 guarded, easily irritable but more visible, no acute events overnight/this am, partially compliant with medical meds. No SI/HI reported.   11/12 guarded, uncooperative, disorganized and irritable, partially cooperative with meds, refuses vitals.   11/13 needed PRN IM and restraints for agitation, physical aggression, calmer later on. Remains delusional, disorganized and labile, partially compliant with medications.  11/14 no overnight events, no PRN needed, refused am meds, complied with hs seroquel. Remains guarded, uncooperative and dismissive, no suicidal/homicidal behavior.   11/15 no acute outbursts, no PRN needed. She does not think seroquel is helpful, only partially compliant with medications, no SI/HI. She spoke to  about abilify, to review her past medications trials.  11/18 Cont current meds including Shafer when due will re-eval Seroquel though other options limited will get ua and if patient will allow will add prn laxatives  11/19 Psychotic not much change level of agitation fluctuates while she regularly expresses paranoia and delusions of pregnancy. Cont current meds encourage compliant encourage allowing assist with ADL's. UA and C and S re-ordered  11/20 Not much change cont resistive to help with hygiene, delusional, irritable. Expressing willingness to try Abilify to [facility], will explore with patient  11/21 Patient calmer today possibly related to prn. Patient expressing willingness to try Abilify but based on past she rarely follows through. Cont current meds for now. U/A neg  no evidence UTI Incontinence likely mix of anatomical and behavioral factors  11/22: More subdued possibly some incremental benefit from regular dosing of decanoate. Will ocntinue will cont Seroquel rather than make changes when patient showing some gains, Will sl increase Seroquel as she appeared in past to benefit from 300mg/d  11/25 Modest gains overall with ongoing epsiodic agitation. Increase Seroquel give next dec of Prolixin at week 3. Ordered podiatry clinic.   11/26 Some gains with administrsation of dec next due 11/29. Plan further titration Seroquel  11/27 Modst gains. Next dec 11/29, plan further titration Seroquel  11/29 Remains with refractory illness. Cont meds, for dec today, plan further titration of Seroquel  12/2 Patient without much change, psychotic, irritable, tenuous at times. Cont treatment consider increasing Seroquel. Will ask PT to re-eval walker generally viewed as not appropriate as she may use it in unsafe way.  12/3 Patient calmer with SHAFER. Cont current meds consider increasing Seroquel. ordered dental consult. Will consider speech consult with hope advancing diet  12/4 Patient w/o much changed, scheduled for dental appointment. Not considered safe to give walker due to risk of aggression Will get labs in AM on routine   basis  12/5 Patient with some gains. Will increase Seroquel will have her seen in dental tomorrow. reviewed plan of care with sister  12/6 Patient improved modestly cont current meds plan to titrate Seroquel consider making Prolixin 25mg monthly to decrease number of injections can return to dental if agrees to extraction or any deterioration occurs  12/9 Patient partially improved cont current meds will increase dose of dec next shot to see if can give q4 weeks  12/10 Modest gains. Cont meds consider increasing Seroquel. Ordered cine to see if feasible to advance diet  12/11 Patient agitated and revealing new delusions. Plan cont to titrate Seroquel and Prolixin. will hold off on rescheduling cine to avoid inadvertently provoking her. Will lower melatonin as she blames this for sedating her leading t missed appointment  12/12 Paranoid, somatic cont current meds.  12/13 Labile, with periods of agitation. Continue current medications   12/16 Not much change cont current meds will increase fluphenazine next shot. Patient refusing to go for cine  12/17 No major changes patient refusing Depakote about half the time, if continues will discuss with neuro what options are available  12/18 Possibly more psychotic, will increase fluphenazine dec next dose.   12/19 More delusional plan increase next dec dose. Will not try at this time givwe acute IM as per court order as leads to increased agitation more than clear benefit. MAy need to return to court for order to give Insulin per court order  12/20 Irritable, angry with receiving SHAFER. Have increase Prolixin to 25mg. Will change metformin Vladislav alfonso hope she will accept a diffenent DM med that is only once a day otherwise may need to pursue TOO  12/23 Calmer since last Shafer dose still delusional. Cont curren t meds except she is willing to go back to metformin will restart and dc Trajenta  12/24 Patient calmer still irritable delusional thought disordered. FS elevated but she is now beginning to resume compliance with metformin which has been effective in fast. Cont current meds for now.   12/25: Calm but remains delusional and thought disordered   12/26 Calmer but psychotic, poorly compliant Cont meds, eval need to consider court fro diabetic treatment over objection  12/27 Less severely agitated since last SHAFER dose.Cont to encourage compliance with PO meds especially given absence of viable IM alternatives but may need to consider TOO for diabetic meds if situation worsens  12/30. Very psychotic though less tenuous and agitated. Will taper off Seroquel to minimize contributions to poor diabetic control. Added Trajenta to metformin after discussion with medicine. Will hold off on court for DM meds to see how she does with above interventions  12/31 Patient very psychotic, aggressive. Cont current meds, consider increase Prolixin next shot or moving up dejon of injection. Consider alternates to Seroquel given sugars high and she is uncooperative with diabetic meds. Family informed of restraint  1/1: Remains intermittently agitated, aggressive, today disorganized but not aggressive, able to be redirected   1/2 Patient less agitated but paranoid, irritable, delusional about pregnancy, refusing most po meds. Reviewed with medicine, no clinical emergency will cont to encourage compliance. Note refusing Depakote but last EEG 9/30 neg for epileptiform activity Will d/c Seroquel as refusing and may increase glucose and will offer Abilify.   1/3 Pt has periods of irritability, refusing FS and vitals though seen active in day area, disorganized. Took dose of Abilify yesterday though nonadherent today.  1/4-1/5: Agitated and disorganized on 1/4 throwing things, pouring drink on her head, sitting in her urine and refusing to be cleaned, requires prn meds but better on 1/5 1/6 Patient refusing all po meds, having periods of agitation, cont to encourage compliance, consider moving up next dec and or increasing dose. Med refuses remain non emergent. Will consider TOO for whatever meds can be given parenterally  1/7 Patient grossly psychotic, no severe agitation/aggression. Refusing po meds. Feel risk benefit of giving IM antipsychotic is poor as the process results in extreme agitation/agression, need for restraints with only brief calming effect. Similar right now risk benefit of getting court order for diabetic meds is not favorable but can be re-evaluated  1/8 No major changes. Level of agitation irritability tend to be variable. Cont current meds w/o change  1/9 No major changes. Cont meds encourage compliance give dec 1/13 1/13: Patient showing some improvement, less agitation, received Prolixin Dec 25mg SHAFER today on 1/13, awaiting state hospital bed.  1/14: No interval changes, tolerated Prolixin Dec with no side effects.  Pending state hospital transfer.  1/15: No changes, irritability noted but less agitation and received SHAFER on 1/13.  1/16: No major changes. Level of agitation irritability tend to be variable. Cont current meds w/o change, did receive SHAFER on 1/13/25.  1/17: No interval changes, barium swallow study ordered and scheduled for 1/21 at 9am to possibly advance diet, no med changes.  1/21 SL calmer, provocative at times with peers and staff. Will advance diet will cont current meds  1/22 Calmer recently of uncertain significance. Cont meds consider again court for DM meds and synthroid will check TSH  1/23 Aggressive towards impaired peer who touched her bible. Will make efforts to keep then , will increase Abilify given she is more compliant  1/24 less psychotic cont meds, encourage compliance, spoke with sister who will also encourage compliance via phone  1/25 Modest gains in psychosis, more compliant cont current treatment. Cont to titrate Abilify if compliant then consider SHAFER  1/26 Less bizarre still poor behavioral control. Will increase Abilify to 15mg/d  Plan:  -[facility] under [facility] legal status  -Routine checks  -Bed block  -Continue Depakote 1000mg BID  -Continue Seroquel 150 mg QHS  -Prolixin Dec 18.75 mg received on 11/6/24.  -Neuro workup at [facility] negative for seizure activity or acute stroke

## 2025-01-26 NOTE — BH INPATIENT PSYCHIATRY PROGRESS NOTE - NSBHCHARTREVIEWVS_PSY_A_CORE FT
Vital Signs Last 24 Hrs  T(C): 36.4 (01-26-25 @ 07:44), Max: 36.4 (01-26-25 @ 07:44)  T(F): 97.5 (01-26-25 @ 07:44), Max: 97.5 (01-26-25 @ 07:44)  HR: --  BP: --  BP(mean): --  RR: --  SpO2: --    Orthostatic VS  01-26-25 @ 07:44  Lying BP: --/-- HR: --  Sitting BP: 141/90 HR: 82  Standing BP: 138/88 HR: 84  Site: --  Mode: --

## 2025-01-26 NOTE — BH INPATIENT PSYCHIATRY PROGRESS NOTE - NSBHFUPINTERVALHXFT_PSY_A_CORE
Patient is seen and evaluated for follow up for psychosis.  Chart is reviewed and case is discussed with nursing team. VSS some improved med compliance Patient verbally abusive with staff peers. Throws food, disruptive during meals and visiting.

## 2025-01-26 NOTE — BH INPATIENT PSYCHIATRY PROGRESS NOTE - CURRENT MEDICATION
MEDICATIONS  (STANDING):  ARIPiprazole 15 milliGRAM(s) Oral daily  cholecalciferol 1000 Unit(s) Oral daily  divalproex Sprinkle 1000 milliGRAM(s) Oral two times a day  glucagon  Injectable 1 milliGRAM(s) IntraMuscular once  insulin lispro (ADMELOG) corrective regimen sliding scale   SubCutaneous at bedtime  insulin lispro (ADMELOG) corrective regimen sliding scale   SubCutaneous three times a day before meals  levothyroxine 75 MICROGram(s) Oral daily  linagliptin 5 milliGRAM(s) Oral daily  melatonin. 3 milliGRAM(s) Oral at bedtime  metFORMIN 1000 milliGRAM(s) Oral two times a day with meals  metoprolol succinate ER 50 milliGRAM(s) Oral daily  multivitamin 2 Tablet(s) Oral at bedtime  senna 2 Tablet(s) Oral at bedtime    MEDICATIONS  (PRN):  acetaminophen     Tablet .. 650 milliGRAM(s) Oral every 6 hours PRN Mild Pain (1 - 3)  albuterol    90 MICROgram(s) HFA Inhaler 2 Puff(s) Inhalation every 6 hours PRN Bronchospasm  aluminum hydroxide/magnesium hydroxide/simethicone Suspension 30 milliLiter(s) Oral every 4 hours PRN Dyspepsia  bisacodyl 5 milliGRAM(s) Oral daily PRN Constipation  dextrose Oral Gel 15 Gram(s) Oral once PRN hypoglycemia  dextrose Oral Gel 15 Gram(s) Oral once PRN Blood Glucose LESS THAN 70 milliGRAM(s)/deciliter  diphenhydrAMINE Elixir 12.5 milliGRAM(s) Oral every 6 hours PRN eps prevention  diphenhydrAMINE Injectable 25 milliGRAM(s) IntraMuscular once PRN EPS prevention  fluPHENAZine 5 milliGRAM(s) Oral every 6 hours PRN agitation  fluPHENAZine  Injectable 5 milliGRAM(s) IntraMuscular once PRN agitation  polyethylene glycol 3350 17 Gram(s) Oral daily PRN constipation

## 2025-01-27 LAB — SARS-COV-2 RNA SPEC QL NAA+PROBE: SIGNIFICANT CHANGE UP

## 2025-01-27 PROCEDURE — 99232 SBSQ HOSP IP/OBS MODERATE 35: CPT

## 2025-01-27 RX ADMIN — Medication 5 MILLIGRAM(S): at 23:12

## 2025-01-27 RX ADMIN — LINAGLIPTIN 5 MILLIGRAM(S): 5 TABLET, FILM COATED ORAL at 09:22

## 2025-01-27 RX ADMIN — ARIPIPRAZOLE 15 MILLIGRAM(S): 5 TABLET ORAL at 09:21

## 2025-01-27 RX ADMIN — Medication 2 TABLET(S): at 23:09

## 2025-01-27 RX ADMIN — Medication 1000 UNIT(S): at 09:21

## 2025-01-27 RX ADMIN — Medication 1000 MILLIGRAM(S): at 23:10

## 2025-01-27 RX ADMIN — METFORMIN HYDROCHLORIDE 1000 MILLIGRAM(S): 1000 TABLET, COATED ORAL at 09:21

## 2025-01-27 RX ADMIN — ACETAMINOPHEN, DIPHENHYDRAMINE HCL, PHENYLEPHRINE HCL 3 MILLIGRAM(S): 325; 25; 5 TABLET ORAL at 23:10

## 2025-01-27 NOTE — BH INPATIENT PSYCHIATRY PROGRESS NOTE - CURRENT MEDICATION
MEDICATIONS  (STANDING):  ARIPiprazole 15 milliGRAM(s) Oral daily  cholecalciferol 1000 Unit(s) Oral daily  divalproex Sprinkle 1000 milliGRAM(s) Oral two times a day  glucagon  Injectable 1 milliGRAM(s) IntraMuscular once  insulin lispro (ADMELOG) corrective regimen sliding scale   SubCutaneous at bedtime  insulin lispro (ADMELOG) corrective regimen sliding scale   SubCutaneous three times a day before meals  levothyroxine 75 MICROGram(s) Oral daily  linagliptin 5 milliGRAM(s) Oral daily  melatonin. 3 milliGRAM(s) Oral at bedtime  metFORMIN 1000 milliGRAM(s) Oral two times a day with meals  metoprolol succinate ER 50 milliGRAM(s) Oral daily  multivitamin 2 Tablet(s) Oral at bedtime  senna 2 Tablet(s) Oral at bedtime    MEDICATIONS  (PRN):  acetaminophen     Tablet .. 650 milliGRAM(s) Oral every 6 hours PRN Mild Pain (1 - 3)  albuterol    90 MICROgram(s) HFA Inhaler 2 Puff(s) Inhalation every 6 hours PRN Bronchospasm  aluminum hydroxide/magnesium hydroxide/simethicone Suspension 30 milliLiter(s) Oral every 4 hours PRN Dyspepsia  bisacodyl 5 milliGRAM(s) Oral daily PRN Constipation  dextrose Oral Gel 15 Gram(s) Oral once PRN Blood Glucose LESS THAN 70 milliGRAM(s)/deciliter  dextrose Oral Gel 15 Gram(s) Oral once PRN hypoglycemia  diphenhydrAMINE Elixir 12.5 milliGRAM(s) Oral every 6 hours PRN eps prevention  diphenhydrAMINE Injectable 25 milliGRAM(s) IntraMuscular once PRN EPS prevention  fluPHENAZine 5 milliGRAM(s) Oral every 6 hours PRN agitation  fluPHENAZine  Injectable 5 milliGRAM(s) IntraMuscular once PRN agitation  polyethylene glycol 3350 17 Gram(s) Oral daily PRN constipation

## 2025-01-27 NOTE — BH INPATIENT PSYCHIATRY PROGRESS NOTE - NSBHCHARTREVIEWVS_PSY_A_CORE FT
Vital Signs Last 24 Hrs  T(C): --  T(F): --  HR: --  BP: --  BP(mean): --  RR: --  SpO2: --    Orthostatic VS  01-26-25 @ 07:44  Lying BP: --/-- HR: --  Sitting BP: 141/90 HR: 82  Standing BP: 138/88 HR: 84  Site: --  Mode: --
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
## 2025-01-27 NOTE — BH INPATIENT PSYCHIATRY PROGRESS NOTE - NSBHMETABOLIC_PSY_ALL_CORE_FT
BMI: BMI (kg/m2): 29.7 (10-21-24 @ 16:58)  HbA1c: A1C with Estimated Average Glucose Result: 9.2 % (01-23-25 @ 08:55)    Glucose: POCT Blood Glucose.: 201 mg/dL (01-15-25 @ 20:39)    BP: --Vital Signs Last 24 Hrs  T(C): --  T(F): --  HR: --  BP: --  BP(mean): --  RR: --  SpO2: --    Orthostatic VS  01-26-25 @ 07:44  Lying BP: --/-- HR: --  Sitting BP: 141/90 HR: 82  Standing BP: 138/88 HR: 84  Site: --  Mode: --    Lipid Panel: Date/Time: 09-30-24 @ 04:45  Cholesterol, Serum: 148  LDL Cholesterol Calculated: 64  HDL Cholesterol, Serum: 56  Total Cholesterol/HDL Ration Measurement: --  Triglycerides, Serum: 138

## 2025-01-28 PROCEDURE — 99232 SBSQ HOSP IP/OBS MODERATE 35: CPT

## 2025-01-28 RX ORDER — MAGNESIUM, ALUMINUM HYDROXIDE 200-225/5
30 SUSPENSION, ORAL (FINAL DOSE FORM) ORAL
Qty: 0 | Refills: 0 | DISCHARGE
Start: 2025-01-28

## 2025-01-28 RX ORDER — ACETAMINOPHEN, DIPHENHYDRAMINE HCL, PHENYLEPHRINE HCL 325; 25; 5 MG/1; MG/1; MG/1
1 TABLET ORAL
Qty: 0 | Refills: 0 | DISCHARGE
Start: 2025-01-28

## 2025-01-28 RX ORDER — ARIPIPRAZOLE 5 MG/1
1 TABLET ORAL
Qty: 0 | Refills: 0 | DISCHARGE
Start: 2025-01-28

## 2025-01-28 RX ORDER — FLUPHENAZINE HCL 5 MG
25 TABLET ORAL
Qty: 0 | Refills: 0 | DISCHARGE

## 2025-01-28 RX ORDER — LINAGLIPTIN 5 MG/1
1 TABLET, FILM COATED ORAL
Qty: 0 | Refills: 0 | DISCHARGE
Start: 2025-01-28

## 2025-01-28 RX ORDER — MECOBAL/LEVOMEFOLAT CA/B6 PHOS 2-3-35 MG
2 TABLET ORAL
Qty: 0 | Refills: 0 | DISCHARGE
Start: 2025-01-28

## 2025-01-28 RX ORDER — DIPHENHYDRAMINE HCL 25 MG
5 CAPSULE ORAL
Qty: 0 | Refills: 0 | DISCHARGE
Start: 2025-01-28

## 2025-01-28 RX ORDER — METFORMIN HYDROCHLORIDE 1000 MG/1
1 TABLET, COATED ORAL
Qty: 0 | Refills: 0 | DISCHARGE
Start: 2025-01-28

## 2025-01-28 RX ORDER — DIVALPROEX SODIUM 500 MG
8 TABLET, DELAYED RELEASE (ENTERIC COATED) ORAL
Qty: 0 | Refills: 0 | DISCHARGE
Start: 2025-01-28

## 2025-01-28 RX ORDER — SENNOSIDES 8.6 MG
2 TABLET ORAL
Qty: 0 | Refills: 0 | DISCHARGE
Start: 2025-01-28

## 2025-01-28 RX ORDER — FLUPHENAZINE HCL 5 MG
5 TABLET ORAL ONCE
Refills: 0 | Status: COMPLETED | OUTPATIENT
Start: 2025-01-28 | End: 2025-01-28

## 2025-01-28 RX ORDER — DIPHENHYDRAMINE HCL 25 MG
25 CAPSULE ORAL ONCE
Refills: 0 | Status: COMPLETED | OUTPATIENT
Start: 2025-01-28 | End: 2025-01-28

## 2025-01-28 RX ORDER — DIPHENHYDRAMINE HCL 25 MG
25 CAPSULE ORAL
Qty: 0 | Refills: 0 | DISCHARGE
Start: 2025-01-28

## 2025-01-28 RX ORDER — FLUPHENAZINE HCL 5 MG
5 TABLET ORAL
Qty: 0 | Refills: 0 | DISCHARGE
Start: 2025-01-28

## 2025-01-28 RX ORDER — ACETAMINOPHEN 160 MG/5ML
2 SUSPENSION ORAL
Qty: 0 | Refills: 0 | DISCHARGE
Start: 2025-01-28

## 2025-01-28 RX ORDER — ALBUTEROL 90 MCG
2 AEROSOL REFILL (GRAM) INHALATION
Qty: 0 | Refills: 0 | DISCHARGE
Start: 2025-01-28

## 2025-01-28 RX ORDER — METOPROLOL SUCCINATE 25 MG
1 TABLET, EXTENDED RELEASE 24 HR ORAL
Qty: 0 | Refills: 0 | DISCHARGE
Start: 2025-01-28

## 2025-01-28 RX ORDER — LEVOTHYROXINE SODIUM 25 UG/1
1 TABLET ORAL
Qty: 0 | Refills: 0 | DISCHARGE
Start: 2025-01-28

## 2025-01-28 RX ORDER — FLUPHENAZINE HCL 5 MG
1 TABLET ORAL
Qty: 0 | Refills: 0 | DISCHARGE
Start: 2025-01-28

## 2025-01-28 RX ADMIN — LEVOTHYROXINE SODIUM 75 MICROGRAM(S): 25 TABLET ORAL at 06:32

## 2025-01-28 RX ADMIN — Medication 50 MILLIGRAM(S): at 08:53

## 2025-01-28 RX ADMIN — Medication 5 MILLIGRAM(S): at 17:35

## 2025-01-28 RX ADMIN — LINAGLIPTIN 5 MILLIGRAM(S): 5 TABLET, FILM COATED ORAL at 08:54

## 2025-01-28 RX ADMIN — Medication 25 MILLIGRAM(S): at 17:34

## 2025-01-28 RX ADMIN — ARIPIPRAZOLE 15 MILLIGRAM(S): 5 TABLET ORAL at 08:53

## 2025-01-28 RX ADMIN — Medication 1000 UNIT(S): at 08:53

## 2025-01-28 RX ADMIN — METFORMIN HYDROCHLORIDE 1000 MILLIGRAM(S): 1000 TABLET, COATED ORAL at 08:54

## 2025-01-28 NOTE — BH DISCHARGE NOTE NURSING/SOCIAL WORK/PSYCH REHAB - NSDCPRGOAL_PSY_ALL_CORE
Patient initially presented with symptoms of paranoia, agitation, aggressive behavior, disorganization and sexually inappropriate behavior. Patient's initial goals included increasing patient's coping skills, increasing frustration tolerance and increasing appropriate social behavior. Patient did not make any major gains towards her rehab goals. Patient remains paranoid, easily agitated, aggressive, disorganized and sexually inappropriate. Patient hygiene and grooming is poor. Patient requires firm limit setting.    ***Patient remained too symptomatic to participate in safety planning.

## 2025-01-28 NOTE — BH DISCHARGE NOTE NURSING/SOCIAL WORK/PSYCH REHAB - NSDCPRRECOMMEND_PSY_ALL_CORE
Patient is scheduled to be discharged today to a [facility] hospital.

## 2025-01-28 NOTE — BH INPATIENT PSYCHIATRY DISCHARGE NOTE - OTHER PAST PSYCHIATRIC HISTORY (INCLUDE DETAILS REGARDING ONSET, COURSE OF ILLNESS, INPATIENT/OUTPATIENT TREATMENT)
Recent transfer from [facility]. Has been hospitalized since January. History of multiple hospitalizations. Currently on Depakote, Seroquel, Prolixin Dec.

## 2025-01-28 NOTE — BH INPATIENT PSYCHIATRY PROGRESS NOTE - NSBHFUPINTERVALHXFT_PSY_A_CORE
Patient is seen and evaluated for follow up for psychosis.  Chart is reviewed and case is discussed with nursing team. VSS some improved med compliance Patient verbally abusive with staff peers. Throws food, disruptive during meals and visiting. Eating sleeping okay

## 2025-01-28 NOTE — BH DISCHARGE NOTE NURSING/SOCIAL WORK/PSYCH REHAB - PATIENT PORTAL LINK FT
You can access the FollowMyHealth Patient Portal offered by [facility] by registering at the following website: [email or web address]. By joining [employer]’s FollowMyHealth portal, you will also be able to view your health information using other applications (apps) compatible with our system.

## 2025-01-28 NOTE — BH INPATIENT PSYCHIATRY PROGRESS NOTE - NSBHASSESSSUMMFT_PSY_ALL_CORE
Ms Azul is a 74 yo F w history of SCZ, Parkinson's dz, hypothyroidism, and multiple psych hospitalizations who presents for AMS. Pt transferred from [facility], where she was found to have fallen and was covered in urine. Pt has been hospitalized in [facility] with occasional stays at [facility] for medical concerns since the start of this year for psychosis. Per [facility] notes, pt appears to be awaiting transfer to [facility] hospital. Pt has been delusional at [facility] with recent delusions surrounding pregnancy. Current medications are Depakote 750 mg bid, Seroquel 50 mg bid, and Prolixin Dec 12.5 mg IM q2w (last dose 9/24).     In [facility] stroke code was negative for acute pathology. Pt refused MRI and neuro deemed it not necessary. EEG did not reveal seizure activity and toxic encephalopathy workup was also negative. Psychiatry continued Depakote 1000mg BID, Seroquel 75mg QHS and Prolixin Dec 12.5 mg IM q2w (last dose given on 10/9), next dose due 10/23/24. As per psych CL pt has been irritable, aggressive at times requiring PRN medications and restraints. Paranoid and disorganized.     On arrival to  pt continues to present paranoid delusions, mentions she was poisoned by nurses at [facility], continues to endorse delusions of pregnancy. Denies SI/HI. Requires inpatient level of care due to inability to care for herself or transition to outpatient level of care given severe episodes of agitation, aggression and disorganized behavior in the context of psychosis.    10/22 Clinical Update: Ms. Azul refused to have her vital signs taken this morning. No behavioral outbursts or aggression reported. Bloodwork reviewed and WNL. Valproic acid level within therapeutic range at 65.70. Pt is visible in the dayroom today. Seems sedated, dozing off during interview. May need Seroquel dose to be adjusted to minimize daytime sedation. Due for Prolixin dec tomorrow.    10/23: Refused VS, irritable on approach, will offer Prolixin Dec tomorrow if more agreeable.  Did take PO meds.    10/24: Episodic agitation, did require IM prn last night.  Refused SHAFER and declined AM meds.  10/25: Remains irritable, refusing meds and VS, declines SHAFER, will likely require MOO.  10/28 Patient psychotic tenuous will increase Seroquel request transfer hearing start TOO, change prn to Prolixin Benadryl as never has had much response to olanzapine  10/29 Somewhat calmer today possibly from prn and taking Seroquel last night but refused BP meds, and Depakote which may affect medical condition encourage compliance placed MOO paperwork  10/30 Selectively compliant with medications. Remains illogical, paranoid, uncooperative with care  10/31 Psychotic disorganized needing prns taking medical meds part of time. Cont meds plan TOO hearing  11/1 Calmer at moment but quite psychotic, compliance with medical and psych meds very erratic Cont meds and plan for TOO hearing  11/2: Noncompliant with medications, uncooperative with unit rules and ADL care.   11/3: Remains paranoid  11/4 Agitated tenuous poorly compliant will go to court for TOO, will move Seroquel to all PM at her request with hope this may improve compliance  11/5 Somewhat calmer today no prns, very psychotic, erratic compliance. Will offer decanoate today  she is due, plan court for TOO in AM.  11/6 Patient psychotic, not much change will give Prolixin  18.75mg today, Cont to titrate Seroquel but only hs at her request.   11/7 Patient irritable, aggressive delusional. Cont decanoate along with increasing Seroquel and using prns , await state transfer  11/8 Patient irritable more negativistic today do not feel though she has catatonic mutism as she is talking to other staff and peers other than writer. Cont to titrate Seroquel if she is taking regularly. If she refuses Depakote regularly may need to eval for transfer for IV anti convulsants.  11/9 guarded, easily irritable but more visible, no acute events overnight/this am, partially compliant with medical meds. No SI/HI reported.   11/12 guarded, uncooperative, disorganized and irritable, partially cooperative with meds, refuses vitals.   11/13 needed PRN IM and restraints for agitation, physical aggression, calmer later on. Remains delusional, disorganized and labile, partially compliant with medications.  11/14 no overnight events, no PRN needed, refused am meds, complied with hs seroquel. Remains guarded, uncooperative and dismissive, no suicidal/homicidal behavior.   11/15 no acute outbursts, no PRN needed. She does not think seroquel is helpful, only partially compliant with medications, no SI/HI. She spoke to  about abilify, to review her past medications trials.  11/18 Cont current meds including Shafer when due will re-eval Seroquel though other options limited will get ua and if patient will allow will add prn laxatives  11/19 Psychotic not much change level of agitation fluctuates while she regularly expresses paranoia and delusions of pregnancy. Cont current meds encourage compliant encourage allowing assist with ADL's. UA and C and S re-ordered  11/20 Not much change cont resistive to help with hygiene, delusional, irritable. Expressing willingness to try Abilify to [facility], will explore with patient  11/21 Patient calmer today possibly related to prn. Patient expressing willingness to try Abilify but based on past she rarely follows through. Cont current meds for now. U/A neg  no evidence UTI Incontinence likely mix of anatomical and behavioral factors  11/22: More subdued possibly some incremental benefit from regular dosing of decanoate. Will ocntinue will cont Seroquel rather than make changes when patient showing some gains, Will sl increase Seroquel as she appeared in past to benefit from 300mg/d  11/25 Modest gains overall with ongoing epsiodic agitation. Increase Seroquel give next dec of Prolixin at week 3. Ordered podiatry clinic.   11/26 Some gains with administrsation of dec next due 11/29. Plan further titration Seroquel  11/27 Modst gains. Next dec 11/29, plan further titration Seroquel  11/29 Remains with refractory illness. Cont meds, for dec today, plan further titration of Seroquel  12/2 Patient without much change, psychotic, irritable, tenuous at times. Cont treatment consider increasing Seroquel. Will ask PT to re-eval walker generally viewed as not appropriate as she may use it in unsafe way.  12/3 Patient calmer with SHAFER. Cont current meds consider increasing Seroquel. ordered dental consult. Will consider speech consult with hope advancing diet  12/4 Patient w/o much changed, scheduled for dental appointment. Not considered safe to give walker due to risk of aggression Will get labs in AM on routine   basis  12/5 Patient with some gains. Will increase Seroquel will have her seen in dental tomorrow. reviewed plan of care with sister  12/6 Patient improved modestly cont current meds plan to titrate Seroquel consider making Prolixin 25mg monthly to decrease number of injections can return to dental if agrees to extraction or any deterioration occurs  12/9 Patient partially improved cont current meds will increase dose of dec next shot to see if can give q4 weeks  12/10 Modest gains. Cont meds consider increasing Seroquel. Ordered cine to see if feasible to advance diet  12/11 Patient agitated and revealing new delusions. Plan cont to titrate Seroquel and Prolixin. will hold off on rescheduling cine to avoid inadvertently provoking her. Will lower melatonin as she blames this for sedating her leading t missed appointment  12/12 Paranoid, somatic cont current meds.  12/13 Labile, with periods of agitation. Continue current medications   12/16 Not much change cont current meds will increase fluphenazine next shot. Patient refusing to go for cine  12/17 No major changes patient refusing Depakote about half the time, if continues will discuss with neuro what options are available  12/18 Possibly more psychotic, will increase fluphenazine dec next dose.   12/19 More delusional plan increase next dec dose. Will not try at this time givwe acute IM as per court order as leads to increased agitation more than clear benefit. MAy need to return to court for order to give Insulin per court order  12/20 Irritable, angry with receiving SHAFER. Have increase Prolixin to 25mg. Will change metformin Vladislav alfonso hope she will accept a diffenent DM med that is only once a day otherwise may need to pursue TOO  12/23 Calmer since last Shafer dose still delusional. Cont curren t meds except she is willing to go back to metformin will restart and dc Trajenta  12/24 Patient calmer still irritable delusional thought disordered. FS elevated but she is now beginning to resume compliance with metformin which has been effective in fast. Cont current meds for now.   12/25: Calm but remains delusional and thought disordered   12/26 Calmer but psychotic, poorly compliant Cont meds, eval need to consider court fro diabetic treatment over objection  12/27 Less severely agitated since last SHAFER dose.Cont to encourage compliance with PO meds especially given absence of viable IM alternatives but may need to consider TOO for diabetic meds if situation worsens  12/30. Very psychotic though less tenuous and agitated. Will taper off Seroquel to minimize contributions to poor diabetic control. Added Trajenta to metformin after discussion with medicine. Will hold off on court for DM meds to see how she does with above interventions  12/31 Patient very psychotic, aggressive. Cont current meds, consider increase Prolixin next shot or moving up dejon of injection. Consider alternates to Seroquel given sugars high and she is uncooperative with diabetic meds. Family informed of restraint  1/1: Remains intermittently agitated, aggressive, today disorganized but not aggressive, able to be redirected   1/2 Patient less agitated but paranoid, irritable, delusional about pregnancy, refusing most po meds. Reviewed with medicine, no clinical emergency will cont to encourage compliance. Note refusing Depakote but last EEG 9/30 neg for epileptiform activity Will d/c Seroquel as refusing and may increase glucose and will offer Abilify.   1/3 Pt has periods of irritability, refusing FS and vitals though seen active in day area, disorganized. Took dose of Abilify yesterday though nonadherent today.  1/4-1/5: Agitated and disorganized on 1/4 throwing things, pouring drink on her head, sitting in her urine and refusing to be cleaned, requires prn meds but better on 1/5 1/6 Patient refusing all po meds, having periods of agitation, cont to encourage compliance, consider moving up next dec and or increasing dose. Med refuses remain non emergent. Will consider TOO for whatever meds can be given parenterally  1/7 Patient grossly psychotic, no severe agitation/aggression. Refusing po meds. Feel risk benefit of giving IM antipsychotic is poor as the process results in extreme agitation/agression, need for restraints with only brief calming effect. Similar right now risk benefit of getting court order for diabetic meds is not favorable but can be re-evaluated  1/8 No major changes. Level of agitation irritability tend to be variable. Cont current meds w/o change  1/9 No major changes. Cont meds encourage compliance give dec 1/13 1/13: Patient showing some improvement, less agitation, received Prolixin Dec 25mg SHAFER today on 1/13, awaiting state hospital bed.  1/14: No interval changes, tolerated Prolixin Dec with no side effects.  Pending state hospital transfer.  1/15: No changes, irritability noted but less agitation and received SHAFER on 1/13.  1/16: No major changes. Level of agitation irritability tend to be variable. Cont current meds w/o change, did receive SHAFER on 1/13/25.  1/17: No interval changes, barium swallow study ordered and scheduled for 1/21 at 9am to possibly advance diet, no med changes.  1/21 SL calmer, provocative at times with peers and staff. Will advance diet will cont current meds  1/22 Calmer recently of uncertain significance. Cont meds consider again court for DM meds and synthroid will check TSH  1/23 Aggressive towards impaired peer who touched her bible. Will make efforts to keep then , will increase Abilify given she is more compliant  1/24 less psychotic cont meds, encourage compliance, spoke with sister who will also encourage compliance via phone  1/25 Modest gains in psychosis, more compliant cont current treatment. Cont to titrate Abilify if compliant then consider SHAFER  1/26 Less bizarre still poor behavioral control. Will increase Abilify to 15mg/d  1/27 Some gains in compliance not translating into much improvement, plan cont current meds. Patient scheduled to go to  [facility] in AM, will notify family, patient likely to need additional to remain calm during transport  Plan:  -[facility] under [facility] legal status  -Routine checks  -Bed block  -Continue Depakote 1000mg BID  -Continue Seroquel 150 mg QHS  -Prolixin Dec 18.75 mg received on 11/6/24.  -Neuro workup at [facility] negative for seizure activity or acute stroke

## 2025-01-28 NOTE — BH DISCHARGE NOTE NURSING/SOCIAL WORK/PSYCH REHAB - DISCHARGE INSTRUCTIONS AFTERCARE APPOINTMENTS
In order to check the location, date, or time of your aftercare appointment, please refer to your Discharge Instructions Document given to you upon leaving the hospital.  If you have lost the instructions please call [phone]

## 2025-01-28 NOTE — BH INPATIENT PSYCHIATRY PROGRESS NOTE - NSBHMETABOLIC_PSY_ALL_CORE_FT
BMI: BMI (kg/m2): 29.7 (10-21-24 @ 16:58)  HbA1c: A1C with Estimated Average Glucose Result: 9.2 % (01-23-25 @ 08:55)    Glucose: POCT Blood Glucose.: 201 mg/dL (01-15-25 @ 20:39)    BP: 143/89 (01-28-25 @ 07:58) (143/89 - 143/89)Vital Signs Last 24 Hrs  T(C): --  T(F): --  HR: 104 (01-28-25 @ 07:58) (104 - 104)  BP: 143/89 (01-28-25 @ 07:58) (143/89 - 143/89)  BP(mean): --  RR: --  SpO2: --      Lipid Panel: Date/Time: 09-30-24 @ 04:45  Cholesterol, Serum: 148  LDL Cholesterol Calculated: 64  HDL Cholesterol, Serum: 56  Total Cholesterol/HDL Ration Measurement: --  Triglycerides, Serum: 138

## 2025-01-28 NOTE — BH INPATIENT PSYCHIATRY DISCHARGE NOTE - HPI (INCLUDE ILLNESS QUALITY, SEVERITY, DURATION, TIMING, CONTEXT, MODIFYING FACTORS, ASSOCIATED SIGNS AND SYMPTOMS)
Ms Azul is a 74 yo F w history of SCZ, Parkinson's dz, hypothyroidism, and multiple psych hospitalizations who presents for AMS. Pt transferred from [facility], where she was found to have fallen and was covered in urine. Pt has been hospitalized in [facility] with occasional stays at [facility] for medical concerns since the start of this year for psychosis. Per [facility] notes, pt appears to be awaiting transfer to [facility] hospital. Pt has been delusional at [facility] with recent delusions surrounding pregnancy. Current medications are Depakote 750 mg bid, Seroquel 50 mg bid, and Prolixin Dec 12.5 mg IM q2w (last dose 9/24).     In [facility] stroke code was negative for acute pathology. Pt refused MRI and neuro deemed it not necessary. EEG did not reveal seizure activity and toxic encephalopathy workup was also negative. Psychiatry continued Depakote 1000mg BID, Seroquel 75mg QHS and Prolixin Dec 12.5 mg IM q2w (last dose given on 10/9), next dose due 10/23/24. As per psych CL pt has been irritable, aggressive at times requiring PRN medications and restraints. Paranoid and disorganized.     On arrival to  Ms. Azul is calm and cooperative with intake process. Appeared tired during encounter. No PRNs received today other than Insulin given as per sliding scale parameters at 9am. VSS. Fingerstick _____. Pt recalls writer. Reports feeling "so-so" and feeling people in [facility] treated her "so-so" as well. Reports paranoid belief she was poisoned in [facility] and because of that now she has a new tooth coming out: "my gums bleed when I brush my teeth too". Does not verbalize delusions about pregnancy to writer but as per handoff pt has continued endorsing this belief on and off. Denies being in any pain or discomfort. Denies suicidal or homicidal ideation, intent or plans but adds that she will respond if "someone messes with me". Will keep patient in a room block due to hx of aggression towards roommates.

## 2025-01-28 NOTE — BH DISCHARGE NOTE NURSING/SOCIAL WORK/PSYCH REHAB - NSCDUDCCRISIS_PSY_A_CORE
[facility]  [phone] [facility] ([phone])  Text "WELL" to [phone]  Website: [email or web address] [phone] Website: [email or web address].org/.  [facility]  1 [phone]) LIF[facility] ([phone])/.  [facility]  [address] [place] [id number] [phone]   Hours:  Monday through Friday from 9 AM to 3 PM

## 2025-01-28 NOTE — BH INPATIENT PSYCHIATRY DISCHARGE NOTE - NSDCMRMEDTOKEN_GEN_ALL_CORE_FT
acetaminophen 325 mg oral tablet: 2 tab(s) orally every 6 hours As needed Mild Pain (1 - 3)  albuterol 90 mcg/inh inhalation aerosol: 2 puff(s) inhaled every 6 hours As needed Bronchospasm  aluminum hydroxide-magnesium hydroxide 200 mg-200 mg/5 mL oral suspension: 30 milliliter(s) orally every 4 hours As needed Dyspepsia  ARIPiprazole 15 mg oral tablet: 1 tab(s) orally once a day  cholecalciferol oral tablet: 1000 unit(s) orally once a day  diphenhydrAMINE 12.5 mg/5 mL oral liquid: 5 milliliter(s) orally every 6 hours As needed eps prevention  diphenhydrAMINE 50 mg/mL injectable solution: 25 milligram(s) injectable every 6 hours as needed for eps prevention  divalproex sodium 125 mg oral delayed release capsule: 8 cap(s) orally 2 times a day  fluPHENAZine: 5 milligram(s) intramuscular every 6 hours as needed for  agitation  fluPHENAZine 5 mg oral tablet: 1 tab(s) orally every 6 hours As needed agitation  levothyroxine 75 mcg (0.075 mg) oral tablet: 1 tab(s) orally once a day  linagliptin 5 mg oral tablet: 1 tab(s) orally once a day  melatonin 3 mg oral tablet: 1 tab(s) orally once a day (at bedtime)  metFORMIN 1000 mg oral tablet: 1 tab(s) orally 2 times a day (with meals)  metoprolol succinate 50 mg oral tablet, extended release: 1 tab(s) orally once a day  Multiple Vitamins oral tablet: 2 tab(s) orally once a day (at bedtime)  Prolixin Decanoate 25 mg/mL injectable solution: 25 milligram(s) intramuscularly every 3 weeks last given 1/13/25  senna leaf extract oral tablet: 2 tab(s) orally once a day (at bedtime)   0 acetaminophen 325 mg oral tablet: 2 tab(s) orally every 6 hours As needed Mild Pain (1 - 3)  albuterol 90 mcg/inh inhalation aerosol: 2 puff(s) inhaled every 6 hours As needed Bronchospasm  aluminum hydroxide-magnesium hydroxide 200 mg-200 mg/5 mL oral suspension: 30 milliliter(s) orally every 4 hours As needed Dyspepsia  ARIPiprazole 15 mg oral tablet: 1 tab(s) orally once a day  cholecalciferol oral tablet: 1000 unit(s) orally once a day  diphenhydrAMINE 12.5 mg/5 mL oral liquid: 5 milliliter(s) orally every 6 hours As needed eps prevention  diphenhydrAMINE 50 mg/mL injectable solution: 25 milligram(s) injectable every 6 hours as needed for eps prevention  divalproex sodium 125 mg oral delayed release capsule: 8 cap(s) orally 2 times a day  fluPHENAZine: 5 milligram(s) intramuscular every 6 hours as needed for agitation  fluPHENAZine 5 mg oral tablet: 1 tab(s) orally every 6 hours As needed agitation  levothyroxine 75 mcg (0.075 mg) oral tablet: 1 tab(s) orally once a day  linagliptin 5 mg oral tablet: 1 tab(s) orally once a day  melatonin 3 mg oral tablet: 1 tab(s) orally once a day (at bedtime)  metFORMIN 1000 mg oral tablet: 1 tab(s) orally 2 times a day (with meals)  metoprolol succinate 50 mg oral tablet, extended release: 1 tab(s) orally once a day  Multiple Vitamins oral tablet: 2 tab(s) orally once a day (at bedtime)  Prolixin Decanoate 25 mg/mL injectable solution: 25 milligram(s) intramuscularly every 3 weeks last given 1/13/25  senna leaf extract oral tablet: 2 tab(s) orally once a day (at bedtime)
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
## 2025-01-28 NOTE — BH INPATIENT PSYCHIATRY PROGRESS NOTE - NSBHCHARTREVIEWVS_PSY_A_CORE FT
Vital Signs Last 24 Hrs  T(C): --  T(F): --  HR: 104 (01-28-25 @ 07:58) (104 - 104)  BP: 143/89 (01-28-25 @ 07:58) (143/89 - 143/89)  BP(mean): --  RR: --  SpO2: --
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
## 2025-01-28 NOTE — BH INPATIENT PSYCHIATRY DISCHARGE NOTE - REASON FOR ADMISSION
psychosis Reason for admit: psychosis , aggression  Means of transport: EMS  Precipitating factors: Poor compliance

## 2025-01-29 VITALS — TEMPERATURE: 98 F

## 2025-01-29 LAB — GLUCOSE BLDC GLUCOMTR-MCNC: 205 MG/DL — HIGH (ref 70–99)

## 2025-01-29 PROCEDURE — 99232 SBSQ HOSP IP/OBS MODERATE 35: CPT

## 2025-01-29 RX ORDER — DIPHENHYDRAMINE HCL 25 MG
50 CAPSULE ORAL ONCE
Refills: 0 | Status: COMPLETED | OUTPATIENT
Start: 2025-01-29 | End: 2025-01-29

## 2025-01-29 RX ORDER — FLUPHENAZINE HCL 5 MG
5 TABLET ORAL ONCE
Refills: 0 | Status: COMPLETED | OUTPATIENT
Start: 2025-01-29 | End: 2025-01-29

## 2025-01-29 RX ORDER — FLUPHENAZINE HCL 5 MG
7.5 TABLET ORAL ONCE
Refills: 0 | Status: COMPLETED | OUTPATIENT
Start: 2025-01-29 | End: 2025-01-29

## 2025-01-29 RX ADMIN — Medication 5 MILLIGRAM(S): at 09:44

## 2025-01-29 RX ADMIN — Medication 7.5 MILLIGRAM(S): at 08:01

## 2025-01-29 RX ADMIN — Medication 50 MILLIGRAM(S): at 08:02

## 2025-01-29 RX ADMIN — Medication 5 MILLIGRAM(S): at 09:16

## 2025-01-29 NOTE — BH INPATIENT PSYCHIATRY PROGRESS NOTE - NSTREATMENTCERTY_PSY_ALL_CORE
3/2022 pharmacy change:   [facility]                             [address] [place] [address]  [email or web address]  [phone]    Received prescription request from new pharmacy for meds as follows--all refilled/sent to new pharmacy.     Diagnoses and all orders for this visit:    Primary osteoarthritis of right knee  -     celecoxib (CeleBREX) 200 MG capsule; Take 1 capsule by mouth daily.    Essential hypertension with goal blood pressure less than 140/90  -     metoPROLOL succinate (TOPROL-XL) 100 MG 24 hr tablet; Take 1 tablet by mouth daily.    Essential hypertension  -     irbesartan (AVAPRO) 300 MG tablet; Take 1 tablet by mouth every evening.    Pure hypercholesterolemia  -     rosuvastatin (CRESTOR) 20 MG tablet; Take 1 tablet by mouth daily.    Neuropathy of both feet  -     gabapentin (NEURONTIN) 300 MG capsule; Take 1 capsule by mouth 2 times daily.    Type 2 diabetes mellitus without complication, without long-term current use of insulin (CMS/[facility])  -     metFORMIN (GLUCOPHAGE) 500 MG tablet; Take 2 tablets by mouth 2 times daily.    Dr. SCHMITT   
That inpatient services furnished since the previous certification or recertification were, and continue to be required: for treatment that could reasonably be expected to improve the patient's condition; or, for diagnostic study;    That the hospital records show that the services furnished were: intensive treatment services; admission and related services necessary for diagnostic study or equivalent services;    That the patient continues to need, on a daily basis active inpatient treatment furnished directly by or requiring the supervision of inpatient psychiatric facility personnel.

## 2025-01-29 NOTE — BH INPATIENT PSYCHIATRY PROGRESS NOTE - NSBHCONTPROVIDER_PSY_ALL_CORE
Yes...

## 2025-01-29 NOTE — BH INPATIENT PSYCHIATRY PROGRESS NOTE - NSTXPSYCHODATEEST_PSY_ALL_CORE
21-Oct-2024

## 2025-01-29 NOTE — BH INPATIENT PSYCHIATRY PROGRESS NOTE - NS ED BHA MED ROS PSYCHIATRIC
See HPI
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
## 2025-01-29 NOTE — BH INPATIENT PSYCHIATRY PROGRESS NOTE - NSBHPROGSUIC_PSY_A_CORE
no

## 2025-01-29 NOTE — BH INPATIENT PSYCHIATRY PROGRESS NOTE - NSTXCOPEDATEEST_PSY_ALL_CORE
06-Jan-2025
29-Oct-2024
02-Dec-2024
23-Dec-2024
06-Jan-2025
30-Dec-2024
18-Nov-2024
20-Jan-2025
25-Nov-2024
20-Jan-2025
25-Nov-2024
04-Nov-2024
02-Dec-2024
04-Nov-2024
16-Dec-2024
30-Dec-2024
04-Nov-2024
06-Jan-2025
16-Dec-2024
02-Dec-2024
29-Oct-2024
30-Dec-2024
02-Dec-2024
16-Dec-2024
30-Dec-2024
02-Dec-2024
16-Dec-2024
20-Jan-2025
02-Dec-2024
27-Jan-2025
30-Dec-2024
11-Nov-2024
18-Nov-2024
23-Dec-2024
02-Dec-2024
29-Oct-2024
11-Nov-2024
23-Dec-2024
06-Jan-2025
18-Nov-2024
20-Jan-2025
30-Dec-2024
06-Jan-2025
20-Jan-2025
02-Dec-2024
23-Dec-2024
11-Nov-2024
04-Nov-2024
11-Nov-2024
29-Oct-2024
06-Jan-2025
02-Dec-2024
18-Nov-2024
23-Oct-2024
27-Jan-2025
23-Oct-2024
25-Nov-2024
04-Nov-2024
29-Jan-2025
16-Dec-2024
06-Jan-2025
25-Nov-2024
29-Oct-2024
06-Jan-2025
18-Nov-2024
20-Jan-2025
06-Jan-2025
23-Oct-2024
02-Dec-2024
30-Dec-2024
23-Dec-2024
29-Oct-2024
11-Nov-2024

## 2025-01-29 NOTE — BH INPATIENT PSYCHIATRY PROGRESS NOTE - NSBHFUPMEDSE_PSY_A_CORE
None known

## 2025-01-29 NOTE — BH INPATIENT PSYCHIATRY PROGRESS NOTE - NSTXPSYCHOPROGRES_PSY_ALL_CORE
No Change

## 2025-01-29 NOTE — BH CHART NOTE - NSEVENTNOTEFT_PSY_ALL_CORE
Psych emergency called 0739 to administer IM Prolixin 7.5mg and IM Benadryl 50mg (ordered by primary team) in preparation of transfer to [facility] this morning given pt's extensive history of verbal and physical aggression towards others. Pt required manual hold at 0747 for medication administration and transfer to restraints starting 0755. Patient seen after IM administered, noted to be resting comfortably in NAD.     After restraint placement, physical exam completed. Patient placed in restraints comfortably. Patient denies physical pain or complaints.     Physical Exam:  Gen: Patient placed comfortably in restraints, NAD  HEENT: NC/AT,  EOMI.   Resp: Breathing comfortably, nonlabored   Ext: Restraints placed appropriately on all extremities (able to easily fit 2 fingers under each restraint). No clubbing, edema, or cyanosis. 5/5 strength throughout all extremities. 2+ pulses of all extremities.   Neuro: awake, alert, grossly oriented.     Assessment:  Shane called for patient agitation and violence towards staff requiring IM PRN medication and 4-point restraints for continued agitation, aggression, and threatening behavior. Patient placed comfortably in restraints. They are clinically stable with exam otherwise unremarkable.     Plan:  - Continue restraints for threat of harm to self/others. Release patient in shortest time possible  - Vitals q2h --> will reassess clinically every hour for need to continue restraints     Discussed with RN staff who agree with plan. Psych emergency called 0739 to administer IM Prolixin 7.5mg and IM Benadryl 50mg (ordered by primary team) in preparation for transfer to [facility] this morning given pt's extensive history of verbal and physical aggression towards others. Pt required manual hold at 0747 for medication administration and transfer to restraints starting 0755. Patient seen after IM administered, noted to be resting comfortably in NAD.     After restraint placement, physical exam completed. Patient placed in restraints comfortably. Patient denies physical pain or complaints.     Physical Exam:  Gen: Patient placed comfortably in restraints, NAD  HEENT: NC/AT,  EOMI.   Resp: Breathing comfortably, nonlabored   Ext: Restraints placed appropriately on all extremities (able to easily fit 2 fingers under each restraint). No clubbing, edema, or cyanosis. 5/5 strength throughout all extremities. 2+ pulses of all extremities.   Neuro: awake, alert, grossly oriented.     Assessment:  Shane called for patient agitation and violence towards staff requiring IM PRN medication and 4-point restraints for continued agitation, aggression, and threatening behavior. Patient placed comfortably in restraints. They are clinically stable with exam otherwise unremarkable.     Plan:  - Continue restraints for threat of harm to self/others. Release patient in shortest time possible  - Vitals q2h --> will reassess clinically every hour for need to continue restraints     Discussed with RN staff who agree with plan.

## 2025-01-29 NOTE — BH INPATIENT PSYCHIATRY PROGRESS NOTE - NSTXPSYCHOGOAL_PSY_ALL_CORE
Will identify 2 coping skills that assist with focus on reality

## 2025-01-29 NOTE — BH INPATIENT PSYCHIATRY PROGRESS NOTE - MSE OPTIONS
Unstructured MSE
Structured MSE
Unstructured MSE
Structured MSE
Unstructured MSE
Structured MSE
Unstructured MSE
Structured MSE
Unstructured MSE
Structured MSE
Unstructured MSE
Structured MSE
Unstructured MSE
Unstructured MSE
Structured MSE
Structured MSE
Unstructured MSE
Structured MSE

## 2025-01-29 NOTE — BH INPATIENT PSYCHIATRY PROGRESS NOTE - NSBHANTIPSYCHOTIC_PSY_ALL_CORE
Yes...

## 2025-01-29 NOTE — BH INPATIENT PSYCHIATRY PROGRESS NOTE - NSTXCOPEGOAL_PSY_ALL_CORE
Identify and utilize 2 coping skills that meet their needs

## 2025-01-29 NOTE — BH INPATIENT PSYCHIATRY PROGRESS NOTE - NSCGIIMPROVESXSCORE_CAL_PSY_ALL_CORE
4
3
4
3
4

## 2025-01-29 NOTE — BH INPATIENT PSYCHIATRY PROGRESS NOTE - NSDCCRITERIA_PSY_ALL_CORE
Pending clinical improvement

## 2025-01-29 NOTE — BH INPATIENT PSYCHIATRY PROGRESS NOTE - NSTXPSYCHODATETRGT_PSY_ALL_CORE
28-Oct-2024

## 2025-01-29 NOTE — BH INPATIENT PSYCHIATRY PROGRESS NOTE - NSTREATMENTCERT_PSY_ALL_CORE
.

## 2025-01-29 NOTE — BH INPATIENT PSYCHIATRY PROGRESS NOTE - NSCGIIMPROVESX_PSY_ALL_CORE
4 = No change - symptoms remain essentially unchanged
3 = Minimally improved - slightly better with little or no clinically meaningful reduction of symptoms.  Represents very little change in basic clinical status, level of care, or functional capacity.
4 = No change - symptoms remain essentially unchanged
3 = Minimally improved - slightly better with little or no clinically meaningful reduction of symptoms.  Represents very little change in basic clinical status, level of care, or functional capacity.
4 = No change - symptoms remain essentially unchanged
4 = No change - symptoms remain essentially unchanged

## 2025-01-29 NOTE — BH INPATIENT PSYCHIATRY PROGRESS NOTE - NSBHROSSYSTEMS_PSY_ALL_CORE
Psychiatric
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
## 2025-01-29 NOTE — BH INPATIENT PSYCHIATRY PROGRESS NOTE - NSTXPROBDCOTHR_PSY_ALL_CORE
[FreeTextEntry1] : Patient with h/o PMB, negative EMB who was scheduled for surgery but cancelled due to inability to obtain clearance from dentist. She then presented for an annual exam and had a repeat sonogram showing a very thick endometrial lining. We discussed the concern for endometrial cancer/endometrial hyperplasia given ultrasound findings and episode of PMB. We also discussed that an EMB only samples 10% of the uterus and that a hysteroscopy, D&C is needed for a full sample. The patient is opting for D&C hysteroscopy at this time. \par \par The indications, risks, benefits and alternatives were including, but not limited to, bleeding, infection, injury to surrounding organs, uterine perforation and fluid overload were discussed at length. The patient expressed an understanding of the treatment rationale and her questions were answered to her apparent satisfaction.\par 
DISCHARGE ISSUE - OTHER

## 2025-01-29 NOTE — BH INPATIENT PSYCHIATRY PROGRESS NOTE - NS ED BHA REVIEW OF ED CHART VITAL SIGNS REVIEWED
Yes

## 2025-01-29 NOTE — BH INPATIENT PSYCHIATRY PROGRESS NOTE - NSBHATTESTBILLING_PSY_A_CORE
09997-Qhqamtjkyf OBS or IP - low complexity OR 25-34 mins
75931-Dtggxpywmz OBS or IP - moderate complexity OR 35-49 mins
90439-Nqmfvbsodh OBS or IP - moderate complexity OR 35-49 mins
62677-Fojoptwtwz OBS or IP - moderate complexity OR 35-49 mins
88445-Ooikjrzixo OBS or IP - moderate complexity OR 35-49 mins
11530-Ndkelutlhk OBS or IP - moderate complexity OR 35-49 mins
56571-Monfwbhtac OBS or IP - low complexity OR 25-34 mins
61241-Wrkozmoryr OBS or IP - moderate complexity OR 35-49 mins
99460-Zerzlsrdlk OBS or IP - moderate complexity OR 35-49 mins
34528-Wjsbfwhofm OBS or IP - moderate complexity OR 35-49 mins
39064-Jbjnquzuvb OBS or IP - moderate complexity OR 35-49 mins
57373-Jkuyzvcgch OBS or IP - moderate complexity OR 35-49 mins
81000-Xcwgbkrwdl OBS or IP - moderate complexity OR 35-49 mins
82537-Dviinfmtzh OBS or IP - moderate complexity OR 35-49 mins
05229-Mgytewlkmt OBS or IP - moderate complexity OR 35-49 mins
55433-Gxcjaqblvh OBS or IP - moderate complexity OR 35-49 mins
27264-Yskmcivcwb OBS or IP - moderate complexity OR 35-49 mins
89637-Ifaohewipu OBS or IP - moderate complexity OR 35-49 mins
96716-Tnydnwibeq OBS or IP - moderate complexity OR 35-49 mins
06875-Etdyydrlaf OBS or IP - moderate complexity OR 35-49 mins
11544-Pyyxpuwbtg OBS or IP - moderate complexity OR 35-49 mins
92221-Rduvyakocj OBS or IP - moderate complexity OR 35-49 mins
58613-Levjuxatxm OBS or IP - moderate complexity OR 35-49 mins
10303-Oyvggkahfo OBS or IP - moderate complexity OR 35-49 mins
28269-Ugprmlxfms OBS or IP - moderate complexity OR 35-49 mins
06121-Yjjmrtcjsm OBS or IP - moderate complexity OR 35-49 mins
77478-Bhoaudrftp OBS or IP - moderate complexity OR 35-49 mins
41733-Ywqayaojtd OBS or IP - moderate complexity OR 35-49 mins
95944-Nhdlkswvki OBS or IP - moderate complexity OR 35-49 mins
14866-Nvicrrdhiq OBS or IP - moderate complexity OR 35-49 mins
56522-Ofnmejcpoz OBS or IP - moderate complexity OR 35-49 mins
47439-Awrolufmri OBS or IP - moderate complexity OR 35-49 mins
03672-Mjlbewxkkz OBS or IP - moderate complexity OR 35-49 mins
96650-Brqvgkyrkd OBS or IP - moderate complexity OR 35-49 mins
14380-Fyyfanhomn OBS or IP - low complexity OR 25-34 mins
89766-Eqhpbkwaco OBS or IP - moderate complexity OR 35-49 mins
15559-Shlyfzuppw OBS or IP - moderate complexity OR 35-49 mins
59166-Pspzcbiikt OBS or IP - moderate complexity OR 35-49 mins
22618-Vsuftcudbj OBS or IP - moderate complexity OR 35-49 mins
88982-Omaccdmuqu OBS or IP - moderate complexity OR 35-49 mins
13792-Fzczqsoijg OBS or IP - moderate complexity OR 35-49 mins
87042-Sapartgjah OBS or IP - low complexity OR 25-34 mins
12331-Gmnlwqffcj OBS or IP - moderate complexity OR 35-49 mins
26909-Ssogzdrugh OBS or IP - moderate complexity OR 35-49 mins
79392-Pizzmgijem OBS or IP - moderate complexity OR 35-49 mins
22838-Mgmniiovch OBS or IP - low complexity OR 25-34 mins
67247-Lsuhxghnzt OBS or IP - moderate complexity OR 35-49 mins
76132-Qrdbuityvx OBS or IP - moderate complexity OR 35-49 mins
69600-Kqkkrgcjqh OBS or IP - moderate complexity OR 35-49 mins
94348-Shqwhcsqhr OBS or IP - moderate complexity OR 35-49 mins
13255-Wvwbshwcgb OBS or IP - moderate complexity OR 35-49 mins
85078-Mgchonewhy OBS or IP - moderate complexity OR 35-49 mins
65186-Cxkvumfvxh OBS or IP - moderate complexity OR 35-49 mins
81048-Irombugypd OBS or IP - moderate complexity OR 35-49 mins
91970-Xgyfebwmyd OBS or IP - moderate complexity OR 35-49 mins
92507-Eshyamkqur OBS or IP - moderate complexity OR 35-49 mins
20794-Kderbfzjre OBS or IP - moderate complexity OR 35-49 mins
24886-Plmqneyozc OBS or IP - moderate complexity OR 35-49 mins
98720-Clsivahzbi OBS or IP - moderate complexity OR 35-49 mins
44953-Mqsaaoilcx OBS or IP - moderate complexity OR 35-49 mins
83686-Mngmdbxxse OBS or IP - moderate complexity OR 35-49 mins
99204-Bmjjpzaaty OBS or IP - moderate complexity OR 35-49 mins
47187-Mxtqlapsyj OBS or IP - moderate complexity OR 35-49 mins
10634-Ocnisnxalp OBS or IP - low complexity OR 25-34 mins
67231-Xgmzkxoino OBS or IP - moderate complexity OR 35-49 mins
30386-Jlmhugfrtm OBS or IP - moderate complexity OR 35-49 mins
60264-Rnxnjkwvyr OBS or IP - moderate complexity OR 35-49 mins
49727-Xcljtiikid OBS or IP - low complexity OR 25-34 mins
46055-Wmapeunjkd OBS or IP - moderate complexity OR 35-49 mins
50125-Ioykqpgwdl OBS or IP - moderate complexity OR 35-49 mins
60968-Rrvnqlfczm OBS or IP - moderate complexity OR 35-49 mins
36984-Xmficoozhr OBS or IP - moderate complexity OR 35-49 mins
45611-Bmtexihuuw OBS or IP - moderate complexity OR 35-49 mins
07883-Mhwincdchm OBS or IP - moderate complexity OR 35-49 mins
17103-Ifnqtkmsbi OBS or IP - moderate complexity OR 35-49 mins
24974-Nvsmajwpsw OBS or IP - moderate complexity OR 35-49 mins
19121-Rasiifpvqr OBS or IP - low complexity OR 25-34 mins

## 2025-01-29 NOTE — BH INPATIENT PSYCHIATRY PROGRESS NOTE - NSTXPSYCHOINTERMD_PSY_ALL_CORE
Continue psychopharm and psychoeducation. 

## 2025-01-29 NOTE — BH INPATIENT PSYCHIATRY PROGRESS NOTE - NSTXCOPEPROGRES_PSY_ALL_CORE
No Change
Improving
No Change
No Change
Improving
No Change
No Change
Improving
Worsening
No Change
No Change
Worsening
No Change
Improving
No Change
No Change
Worsening
No Change
Worsening
No Change
No Change
Worsening
No Change
Worsening
No Change
Improving
Worsening
Worsening
No Change
No Change
Worsening
Worsening
Improving
No Change
Improving
No Change
No Change
Improving
Improving
No Change
Worsening
Improving
Improving
Worsening
No Change
No Change
Improving
Improving
No Change
Worsening
No Change
Worsening
Worsening
Improving
Improving
No Change
No Change
Worsening
No Change
Worsening
Worsening
No Change
Worsening
No Change

## 2025-01-29 NOTE — BH INPATIENT PSYCHIATRY PROGRESS NOTE - NSBHATTESTTYPEVISIT_PSY_A_CORE
Attending Only
MICHELLE without on-site Attending supervision
Attending Only

## 2025-01-29 NOTE — BH INPATIENT PSYCHIATRY PROGRESS NOTE - NSCGISEVERILLNESS_PSY_ALL_CORE
6 = Severely ill - disruptive pathology, behavior and function are frequently influenced by symptoms, may require assistance from others
5 = Markedly ill - intrusive symptoms that distinctly impair social/occupational function or cause intrusive levels of distress
6 = Severely ill - disruptive pathology, behavior and function are frequently influenced by symptoms, may require assistance from others

## 2025-01-29 NOTE — BH INPATIENT PSYCHIATRY PROGRESS NOTE - NSTXDCOTHRDATETRGT_PSY_ALL_CORE
21-Jan-2025
31-Dec-2024
05-Nov-2024
05-Nov-2024
31-Dec-2024
05-Nov-2024
21-Jan-2025
29-Jan-2025
03-Dec-2024
29-Jan-2025
03-Dec-2024
10-Dec-2024
10-Dec-2024
17-Dec-2024
09-Jan-2025
29-Oct-2024
29-Oct-2024
31-Dec-2024
17-Dec-2024
14-Nov-2024
26-Nov-2024
19-Nov-2024
25-Dec-2024
31-Dec-2024
17-Dec-2024
03-Dec-2024
17-Dec-2024
25-Dec-2024
29-Jan-2025
31-Dec-2024
05-Nov-2024
14-Nov-2024
26-Nov-2024
29-Jan-2025
05-Nov-2024
21-Jan-2025
14-Jan-2025
29-Jan-2025
29-Oct-2024
29-Jan-2025
17-Dec-2024
17-Dec-2024
05-Nov-2024
03-Dec-2024
05-Nov-2024
21-Jan-2025
10-Dec-2024
14-Jan-2025
25-Dec-2024
10-Dec-2024
26-Nov-2024
21-Jan-2025
25-Dec-2024
31-Dec-2024
10-Dec-2024
19-Nov-2024
29-Jan-2025
14-Jan-2025
31-Dec-2024
05-Nov-2024
29-Jan-2025
25-Dec-2024
09-Jan-2025
05-Nov-2024
14-Jan-2025
26-Nov-2024
09-Jan-2025
26-Nov-2024
14-Jan-2025
14-Jan-2025
09-Jan-2025
19-Nov-2024
14-Nov-2024
29-Oct-2024
29-Oct-2024
19-Nov-2024
19-Nov-2024

## 2025-01-29 NOTE — BH INPATIENT PSYCHIATRY PROGRESS NOTE - NSTXDCOTHRDATEEST_PSY_ALL_CORE
03-Dec-2024
14-Jan-2025
22-Jan-2025
24-Dec-2024
07-Nov-2024
10-Dec-2024
19-Nov-2024
14-Jan-2025
24-Dec-2024
12-Nov-2024
10-Dec-2024
07-Nov-2024
19-Nov-2024
07-Jan-2025
22-Oct-2024
29-Oct-2024
19-Nov-2024
03-Dec-2024
18-Dec-2024
12-Nov-2024
26-Nov-2024
19-Nov-2024
22-Oct-2024
26-Nov-2024
29-Oct-2024
29-Oct-2024
07-Jan-2025
10-Dec-2024
10-Dec-2024
29-Oct-2024
14-Jan-2025
24-Dec-2024
29-Oct-2024
29-Oct-2024
26-Nov-2024
29-Oct-2024
02-Jan-2025
02-Jan-2025
18-Dec-2024
26-Nov-2024
02-Jan-2025
07-Jan-2025
22-Jan-2025
22-Jan-2025
24-Dec-2024
07-Nov-2024
19-Nov-2024
29-Oct-2024
24-Dec-2024
10-Dec-2024
22-Jan-2025
07-Jan-2025
10-Dec-2024
22-Jan-2025
07-Jan-2025
14-Jan-2025
22-Jan-2025
18-Dec-2024
24-Dec-2024
22-Oct-2024
18-Dec-2024
22-Jan-2025
14-Jan-2025
22-Jan-2025
18-Dec-2024
02-Jan-2025
03-Dec-2024
03-Dec-2024
12-Nov-2024
24-Dec-2024
29-Oct-2024
03-Dec-2024
07-Jan-2025
22-Oct-2024
22-Oct-2024
12-Nov-2024
12-Nov-2024

## 2025-01-29 NOTE — BH INPATIENT PSYCHIATRY PROGRESS NOTE - NSBHMETABOLIC_PSY_ALL_CORE_FT
BMI: BMI (kg/m2): 29.7 (10-21-24 @ 16:58)  HbA1c: A1C with Estimated Average Glucose Result: 9.2 % (01-23-25 @ 08:55)    Glucose: POCT Blood Glucose.: 205 mg/dL (01-29-25 @ 08:45)    BP: 143/89 (01-28-25 @ 07:58) (143/89 - 143/89)Vital Signs Last 24 Hrs  T(C): 36.8 (01-29-25 @ 08:02), Max: 36.8 (01-29-25 @ 08:02)  T(F): 98.3 (01-29-25 @ 08:02), Max: 98.3 (01-29-25 @ 08:02)  HR: --  BP: --  BP(mean): --  RR: --  SpO2: --      Lipid Panel: Date/Time: 09-30-24 @ 04:45  Cholesterol, Serum: 148  LDL Cholesterol Calculated: 64  HDL Cholesterol, Serum: 56  Total Cholesterol/HDL Ration Measurement: --  Triglycerides, Serum: 138
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
## 2025-01-29 NOTE — BH INPATIENT PSYCHIATRY PROGRESS NOTE - NSBHCONSULTIPREASON_PSY_A_CORE
danger to others; mental illness expected to respond to inpatient care

## 2025-01-29 NOTE — BH INPATIENT PSYCHIATRY PROGRESS NOTE - NSTXDCOTHRPROGRES_PSY_ALL_CORE
No Change
Improving
No Change
Worsening
No Change
Improving
No Change
Worsening
No Change
No Change
Improving
No Change
No Change
Improving
No Change
Improving
Improving
No Change
Improving
No Change
Improving
No Change
Improving
No Change
Worsening
No Change
Worsening
No Change
Improving
No Change
Improving
No Change
No Change

## 2025-01-29 NOTE — BH INPATIENT PSYCHIATRY PROGRESS NOTE - [id number]_PSY_ALL_CORE
Schizophrenia   F20.9  

## 2025-01-29 NOTE — BH INPATIENT PSYCHIATRY PROGRESS NOTE - NSTXPROBCOPE_PSY_ALL_CORE
COPING, INEFFECTIVE

## 2025-01-29 NOTE — BH INPATIENT PSYCHIATRY PROGRESS NOTE - NSTXCOPEDATETRGT_PSY_ALL_CORE
09-Dec-2024
25-Nov-2024
23-Dec-2024
06-Jan-2025
13-Jan-2025
03-Feb-2025
13-Jan-2025
30-Oct-2024
11-Nov-2024
16-Dec-2024
06-Jan-2025
09-Dec-2024
13-Jan-2025
25-Nov-2024
25-Nov-2024
30-Oct-2024
05-Nov-2024
06-Jan-2025
23-Dec-2024
30-Dec-2024
06-Jan-2025
09-Dec-2024
13-Jan-2025
30-Dec-2024
27-Jan-2025
05-Nov-2024
23-Dec-2024
11-Nov-2024
16-Dec-2024
20-Jan-2025
02-Dec-2024
13-Jan-2025
02-Dec-2024
30-Dec-2024
23-Dec-2024
25-Nov-2024
11-Nov-2024
06-Jan-2025
05-Nov-2024
05-Nov-2024
09-Dec-2024
20-Jan-2025
25-Nov-2024
23-Dec-2024
05-Nov-2024
16-Dec-2024
11-Nov-2024
27-Jan-2025
20-Jan-2025
29-Jan-2025
30-Dec-2024
16-Dec-2024
27-Jan-2025
30-Oct-2024
05-Nov-2024
11-Nov-2024
13-Jan-2025
03-Feb-2025
09-Dec-2024
18-Nov-2024
27-Jan-2025
27-Jan-2025
02-Dec-2024
06-Jan-2025
06-Jan-2025
27-Jan-2025
18-Nov-2024
16-Dec-2024
20-Jan-2025
20-Jan-2025
02-Dec-2024
30-Dec-2024
18-Nov-2024

## 2025-01-29 NOTE — BH INPATIENT PSYCHIATRY PROGRESS NOTE - NSTXPROBPSYCHO_PSY_ALL_CORE
PSYCHOTIC SYMPTOMS

## 2025-01-29 NOTE — BH INPATIENT PSYCHIATRY PROGRESS NOTE - NSBHCONSBHPROVDETAILS_PSY_A_CORE  FT
Handoff received by psych CL

## 2025-01-29 NOTE — BH INPATIENT PSYCHIATRY PROGRESS NOTE - NSBHMETABOLICLABS_PSY_ALL_CORE
Labs within last 12 months

## 2025-01-29 NOTE — BH INPATIENT PSYCHIATRY PROGRESS NOTE - NSCGISEVERITYSCORE_CAL_PSY_ALL_CORE
6
Patient would like communication of their results via:   Home Phone: [phone] (home). Okay to leave a message containing results? Yes    LVM for patient that her left breast diagnostic is a f/u from last 12/2016 for benign calcifications, patient told that we are recommending a diagnostic image of the left breast to make sure there are not changes from last December, patient told to call back if she has any questions, gave pt number to schedule diagnostic mammogram.   
6
5
6
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
## 2025-01-29 NOTE — BH INPATIENT PSYCHIATRY PROGRESS NOTE - NSBHCHARTREVIEWVS_PSY_A_CORE FT
Vital Signs Last 24 Hrs  T(C): 36.8 (01-29-25 @ 08:02), Max: 36.8 (01-29-25 @ 08:02)  T(F): 98.3 (01-29-25 @ 08:02), Max: 98.3 (01-29-25 @ 08:02)  HR: --  BP: --  BP(mean): --  RR: --  SpO2: --

## 2025-01-29 NOTE — BH INPATIENT PSYCHIATRY PROGRESS NOTE - NSTXDCOTHRGOAL_PSY_ALL_CORE
The pt will be transferred to [facility] for long term inpatient treatment. The pt is on the wait list.
The pt will be transferred to [facility] for long term inpatient treatment. The pt is on the wait list.
The pt will be transferred to [facility] for long term inpatient treatment. The pt is on the waiting list.
The pt will be transferred to [facility] for long term inpatient treatment. The pt is on the waiting list for transfer.
The pt will be transferred to [facility] for long term inpatient treatment. The pt is currently on the wait list.
The pt will be transferred to [facility] for long term inpatient treatment. The pt is on the wait list for transfer.
The pt will be transferred to [facility] for long term inpatient treatment. The pt is on the waiting list.
The pt will be transferred to [facility] for long term inpatient treatment. The pt is on the wait list for transfer.
The pt will be transferred to [facility] for long term inpatient treatment. The pt is on the waiting list.
The pt will be transferred to [facility] for long term inpatient treatment. The pt is on the waiting list.
The pt will be transferred to [facility] for long term inpatient treatment.
The pt will be transferred to [facility] for long term inpatient treatment. The pt is on the waiting list.
The pt will be transferred to [facility] for long term inpatient treatment. The pt is on the wait list for transfer.
The pt will be transferred to [facility] for long term inpatient treatment. The pt is on the waiting list for transfer.
The pt will be transferred to [facility] for long term inpatient treatment.
The pt will be transferred to [facility] for long term inpatient treatment.
The pt will be transferred to [facility] for long term inpatient treatment. The pt is on the waiting list.
The pt will be transferred to [facility] for long term inpatient treatment. The pt is on the waiting list for transfer.
The pt will be transferred to [facility] for long term inpatient treatment. The pt is on the wait list.
The pt will be transferred to [facility] for long term inpatient treatment. The pt is on the wait list.
The pt will be transferred to [facility] for long term inpatient treatment.
The pt will be transferred to [facility] for long term inpatient treatment. The pt is on the waiting list.
The pt will be transferred to [facility] for long term inpatient treatment. The pt is on the wait list.
The pt will be transferred to [facility] for long term inpatient treatment.
The pt will be discharged to [facility] for long term inpatient treatment. The pt is currently on the wait list for transfer.
The pt will be transferred to [facility] for long term inpatient treatment. The pt is on the waiting list for transfer.
The pt will be transferred to [facility] for long term inpatient treatment. The pt is on the wait list for transfer.
The pt will be transferred to [facility] for long term inpatient treatment.
The pt will be transferred to [facility] for long term inpatient treatment. The pt is on the waiting list.
The pt will be transferred to [facility] for long term inpatient treatment.
The pt will be transferred to [facility] for long term inpatient treatment. The pt is on the waiting list.
The pt will be transferred to [facility] for long term inpatient treatment. The pt is on the waiting list for transfer.
The pt will be transferred to [facility] for long term inpatient treatment. The pt is on the wait list for transfer.
The pt will be transferred to [facility] for long term inpatient treatment. The pt is on the wait list.
The pt will be transferred to [facility] for long term inpatient treatment. The pt is on the waiting list for transfer.
The pt will be transferred to [facility] for long term inpatient treatment. The pt is on the waiting list for transfer.
The pt will be transferred to [facility] for long term inpatient treatment. The pt is on the wait list.
The pt will be transferred to [facility] for long term inpatient treatment.
The pt will be transferred to [facility] for long term inpatient treatment. The pt is on the wait list.
The pt will be transferred to [facility] for long term inpatient treatment. The pt is on the wait list for transfer.
The pt will be transferred to [facility] for long term inpatient treatment. The pt is currently on the wait list.
The pt will be transferred to [facility] for long term inpatient treatment. The pt is on the wait list for transfer.
The pt will be discharged to [facility] for long term inpatient treatment. The pt is currently on the wait list for transfer.
The pt will be transferred to [facility] for long term inpatient treatment.
The pt will be transferred to [facility] for long term inpatient treatment. The pt is on the waiting list.
The pt will be transferred to [facility] for long term inpatient treatment. The pt is on the waiting list for transfer.
The pt will be transferred to [facility] for long term inpatient treatment.
The pt will be transferred to [facility] for long term inpatient treatment.
The pt will be transferred to [facility] for long term inpatient treatment. The pt is on the wait list.
The pt will be transferred to [facility] for long term inpatient treatment. The pt is on the wait list.
The pt will be transferred to [facility] for long term inpatient treatment. The pt is on the wait list.
The pt will be transferred to [facility] for long term inpatient treatment. The pt is on the wait list.
The pt will be transferred to [facility] for long term inpatient treatment. The pt is on the waiting list for transfer.
The pt will be transferred to [facility] for long term inpatient treatment. The pt is on the wait list.
The pt will be transferred to [facility] for long term inpatient treatment. The pt is on the waiting list.
The pt will be transferred to [facility] for long term inpatient treatment. The pt is on the waiting list.
The pt will be transferred to [facility] for long term inpatient treatment. The pt is currently on the wait list.
The pt will be transferred to [facility] for long term inpatient treatment. The pt is on the waiting list for transfer.
The pt will be transferred to [facility] for long term inpatient treatment. The pt is on the wait list for transfer.
The pt will be transferred to [facility] for long term inpatient treatment. The pt is on the wait list.
The pt will be transferred to [facility] for long term inpatient treatment. The pt is on the wait list for transfer.
The pt will be transferred to [facility] for long term inpatient treatment. The pt is on the wait list for transfer.
The pt will be transferred to [facility] for long term inpatient treatment. The pt is on the wait list.
The pt will be transferred to [facility] for long term inpatient treatment. The pt is on the waiting list for transfer.
The pt will be transferred to [facility] for long term inpatient treatment. The pt is on the wait list.
The pt will be transferred to [facility] for long term inpatient treatment. The pt is on the wait list.
The pt will be transferred to [facility] for long term inpatient treatment. The pt is on the waiting list.
The pt will be transferred to [facility] for long term inpatient treatment. The pt is on the wait list.
The pt will be transferred to [facility] for long term inpatient treatment. The pt is on the wait list for transfer.
The pt will be transferred to [facility] for long term inpatient treatment. The pt is on the waiting list for transfer.
The pt will be transferred to [facility] for long term inpatient treatment. The pt is currently on the wait list.
The pt will be discharged to [facility] for long term inpatient treatment. The pt is currently on the wait list for transfer.
The pt will be transferred to [facility] for long term inpatient treatment.
The pt will be transferred to [facility] for long term inpatient treatment.
The pt will be transferred to [facility] for long term inpatient treatment.
The pt will be transferred to [facility] for long term inpatient treatment. The pt is on the waiting list for transfer.
The pt will be transferred to [facility] for long term inpatient treatment. The pt is on the waiting list for transfer.

## 2025-01-29 NOTE — BH CHART NOTE - NSNOTETYPE_PSY_ALL_CORE
Event Note

## 2025-01-29 NOTE — [facility] INPATIENT PSYCHIATRY PROGRESS NOTE - PRN MEDS
MEDICATIONS  (PRN):  acetaminophen     Tablet .. 650 milliGRAM(s) Oral every 6 hours PRN Mild Pain (1 - 3)  albuterol    90 MICROgram(s) HFA Inhaler 2 Puff(s) Inhalation every 6 hours PRN Bronchospasm  aluminum hydroxide/magnesium hydroxide/simethicone Suspension 30 milliLiter(s) Oral every 4 hours PRN Dyspepsia  dextrose Oral Gel 15 Gram(s) Oral once PRN hypoglycemia  dextrose Oral Gel 15 Gram(s) Oral once PRN Blood Glucose LESS THAN 70 milliGRAM(s)/deciliter  diphenhydrAMINE Elixir 12.5 milliGRAM(s) Oral every 6 hours PRN eps prevention  diphenhydrAMINE Injectable 12.5 milliGRAM(s) IntraMuscular once PRN EPS prevention  fluPHENAZine 5 milliGRAM(s) Oral every 6 hours PRN agitation  fluPHENAZine  Injectable 5 milliGRAM(s) IntraMuscular once PRN agitation  
MEDICATIONS  (PRN):  acetaminophen     Tablet .. 650 milliGRAM(s) Oral every 6 hours PRN Mild Pain (1 - 3)  albuterol    90 MICROgram(s) HFA Inhaler 2 Puff(s) Inhalation every 6 hours PRN Bronchospasm  aluminum hydroxide/magnesium hydroxide/simethicone Suspension 30 milliLiter(s) Oral every 4 hours PRN Dyspepsia  dextrose Oral Gel 15 Gram(s) Oral once PRN hypoglycemia  dextrose Oral Gel 15 Gram(s) Oral once PRN Blood Glucose LESS THAN 70 milliGRAM(s)/deciliter  diphenhydrAMINE Elixir 12.5 milliGRAM(s) Oral every 6 hours PRN eps prevention  diphenhydrAMINE Injectable 12.5 milliGRAM(s) IntraMuscular once PRN EPS prevention  fluPHENAZine 5 milliGRAM(s) Oral every 6 hours PRN agitation  fluPHENAZine  Injectable 5 milliGRAM(s) IntraMuscular once PRN agitation  
MEDICATIONS  (PRN):  acetaminophen     Tablet .. 650 milliGRAM(s) Oral every 6 hours PRN Mild Pain (1 - 3)  albuterol    90 MICROgram(s) HFA Inhaler 2 Puff(s) Inhalation every 6 hours PRN Bronchospasm  aluminum hydroxide/magnesium hydroxide/simethicone Suspension 30 milliLiter(s) Oral every 4 hours PRN Dyspepsia  bisacodyl 5 milliGRAM(s) Oral daily PRN Constipation  dextrose Oral Gel 15 Gram(s) Oral once PRN hypoglycemia  dextrose Oral Gel 15 Gram(s) Oral once PRN Blood Glucose LESS THAN 70 milliGRAM(s)/deciliter  diphenhydrAMINE Elixir 12.5 milliGRAM(s) Oral every 6 hours PRN eps prevention  diphenhydrAMINE Injectable 12.5 milliGRAM(s) IntraMuscular once PRN EPS prevention  fluPHENAZine 5 milliGRAM(s) Oral every 6 hours PRN agitation  fluPHENAZine  Injectable 5 milliGRAM(s) IntraMuscular once PRN agitation  polyethylene glycol 3350 17 Gram(s) Oral daily PRN constipation  
MEDICATIONS  (PRN):  acetaminophen     Tablet .. 650 milliGRAM(s) Oral every 6 hours PRN Mild Pain (1 - 3)  albuterol    90 MICROgram(s) HFA Inhaler 2 Puff(s) Inhalation every 6 hours PRN Bronchospasm  aluminum hydroxide/magnesium hydroxide/simethicone Suspension 30 milliLiter(s) Oral every 4 hours PRN Dyspepsia  dextrose Oral Gel 15 Gram(s) Oral once PRN hypoglycemia  dextrose Oral Gel 15 Gram(s) Oral once PRN Blood Glucose LESS THAN 70 milliGRAM(s)/deciliter  diphenhydrAMINE Elixir 12.5 milliGRAM(s) Oral every 6 hours PRN eps prevention  diphenhydrAMINE Injectable 12.5 milliGRAM(s) IntraMuscular once PRN EPS prevention  fluPHENAZine 5 milliGRAM(s) Oral every 6 hours PRN agitation  fluPHENAZine  Injectable 5 milliGRAM(s) IntraMuscular once PRN agitation  
MEDICATIONS  (PRN):  acetaminophen     Tablet .. 650 milliGRAM(s) Oral every 6 hours PRN Mild Pain (1 - 3)  albuterol    90 MICROgram(s) HFA Inhaler 2 Puff(s) Inhalation every 6 hours PRN Bronchospasm  aluminum hydroxide/magnesium hydroxide/simethicone Suspension 30 milliLiter(s) Oral every 4 hours PRN Dyspepsia  dextrose Oral Gel 15 Gram(s) Oral once PRN hypoglycemia  dextrose Oral Gel 15 Gram(s) Oral once PRN Blood Glucose LESS THAN 70 milliGRAM(s)/deciliter  diphenhydrAMINE Elixir 12.5 milliGRAM(s) Oral every 6 hours PRN eps prevention  diphenhydrAMINE Injectable 12.5 milliGRAM(s) IntraMuscular once PRN EPS prevention  fluPHENAZine 5 milliGRAM(s) Oral every 6 hours PRN agitation  fluPHENAZine  Injectable 5 milliGRAM(s) IntraMuscular once PRN agitation  
MEDICATIONS  (PRN):  acetaminophen     Tablet .. 650 milliGRAM(s) Oral every 6 hours PRN Mild Pain (1 - 3)  albuterol    90 MICROgram(s) HFA Inhaler 2 Puff(s) Inhalation every 6 hours PRN Bronchospasm  aluminum hydroxide/magnesium hydroxide/simethicone Suspension 30 milliLiter(s) Oral every 4 hours PRN Dyspepsia  bisacodyl 5 milliGRAM(s) Oral daily PRN Constipation  dextrose Oral Gel 15 Gram(s) Oral once PRN Blood Glucose LESS THAN 70 milliGRAM(s)/deciliter  dextrose Oral Gel 15 Gram(s) Oral once PRN hypoglycemia  diphenhydrAMINE Elixir 12.5 milliGRAM(s) Oral every 6 hours PRN eps prevention  diphenhydrAMINE Injectable 12.5 milliGRAM(s) IntraMuscular once PRN EPS prevention  diphenhydrAMINE Injectable 12.5 milliGRAM(s) IntraMuscular once PRN EPS prevention  fluPHENAZine 5 milliGRAM(s) Oral every 6 hours PRN agitation  fluPHENAZine  Injectable 5 milliGRAM(s) IntraMuscular once PRN agitation  fluPHENAZine  Injectable 5 milliGRAM(s) IntraMuscular once PRN agitation  polyethylene glycol 3350 17 Gram(s) Oral daily PRN constipation  
MEDICATIONS  (PRN):  acetaminophen     Tablet .. 650 milliGRAM(s) Oral every 6 hours PRN Mild Pain (1 - 3)  albuterol    90 MICROgram(s) HFA Inhaler 2 Puff(s) Inhalation every 6 hours PRN Bronchospasm  aluminum hydroxide/magnesium hydroxide/simethicone Suspension 30 milliLiter(s) Oral every 4 hours PRN Dyspepsia  bisacodyl 5 milliGRAM(s) Oral daily PRN Constipation  dextrose Oral Gel 15 Gram(s) Oral once PRN hypoglycemia  dextrose Oral Gel 15 Gram(s) Oral once PRN Blood Glucose LESS THAN 70 milliGRAM(s)/deciliter  diphenhydrAMINE Elixir 12.5 milliGRAM(s) Oral every 6 hours PRN eps prevention  diphenhydrAMINE Injectable 25 milliGRAM(s) IntraMuscular once PRN EPS prevention  fluPHENAZine 5 milliGRAM(s) Oral every 6 hours PRN agitation  fluPHENAZine  Injectable 5 milliGRAM(s) IntraMuscular once PRN agitation  polyethylene glycol 3350 17 Gram(s) Oral daily PRN constipation  
MEDICATIONS  (PRN):  acetaminophen     Tablet .. 650 milliGRAM(s) Oral every 6 hours PRN Mild Pain (1 - 3)  albuterol    90 MICROgram(s) HFA Inhaler 2 Puff(s) Inhalation every 6 hours PRN Bronchospasm  aluminum hydroxide/magnesium hydroxide/simethicone Suspension 30 milliLiter(s) Oral every 4 hours PRN Dyspepsia  bisacodyl 5 milliGRAM(s) Oral daily PRN Constipation  dextrose Oral Gel 15 Gram(s) Oral once PRN hypoglycemia  dextrose Oral Gel 15 Gram(s) Oral once PRN Blood Glucose LESS THAN 70 milliGRAM(s)/deciliter  diphenhydrAMINE Elixir 12.5 milliGRAM(s) Oral every 6 hours PRN eps prevention  diphenhydrAMINE Injectable 12.5 milliGRAM(s) IntraMuscular once PRN EPS prevention  fluPHENAZine 5 milliGRAM(s) Oral every 6 hours PRN agitation  fluPHENAZine  Injectable 5 milliGRAM(s) IntraMuscular once PRN agitation  polyethylene glycol 3350 17 Gram(s) Oral daily PRN constipation  
MEDICATIONS  (PRN):  acetaminophen     Tablet .. 650 milliGRAM(s) Oral every 6 hours PRN Mild Pain (1 - 3)  albuterol    90 MICROgram(s) HFA Inhaler 2 Puff(s) Inhalation every 6 hours PRN Bronchospasm  aluminum hydroxide/magnesium hydroxide/simethicone Suspension 30 milliLiter(s) Oral every 4 hours PRN Dyspepsia  bisacodyl 5 milliGRAM(s) Oral daily PRN Constipation  dextrose Oral Gel 15 Gram(s) Oral once PRN hypoglycemia  dextrose Oral Gel 15 Gram(s) Oral once PRN Blood Glucose LESS THAN 70 milliGRAM(s)/deciliter  diphenhydrAMINE Elixir 12.5 milliGRAM(s) Oral every 6 hours PRN eps prevention  diphenhydrAMINE Injectable 12.5 milliGRAM(s) IntraMuscular once PRN EPS prevention  fluPHENAZine 5 milliGRAM(s) Oral every 6 hours PRN agitation  fluPHENAZine  Injectable 5 milliGRAM(s) IntraMuscular once PRN agitation  polyethylene glycol 3350 17 Gram(s) Oral daily PRN constipation  
MEDICATIONS  (PRN):  acetaminophen     Tablet .. 650 milliGRAM(s) Oral every 6 hours PRN Mild Pain (1 - 3)  albuterol    90 MICROgram(s) HFA Inhaler 2 Puff(s) Inhalation every 6 hours PRN Bronchospasm  aluminum hydroxide/magnesium hydroxide/simethicone Suspension 30 milliLiter(s) Oral every 4 hours PRN Dyspepsia  bisacodyl 5 milliGRAM(s) Oral daily PRN Constipation  dextrose Oral Gel 15 Gram(s) Oral once PRN Blood Glucose LESS THAN 70 milliGRAM(s)/deciliter  dextrose Oral Gel 15 Gram(s) Oral once PRN hypoglycemia  diphenhydrAMINE Elixir 12.5 milliGRAM(s) Oral every 6 hours PRN eps prevention  diphenhydrAMINE Injectable 25 milliGRAM(s) IntraMuscular once PRN EPS prevention  fluPHENAZine 5 milliGRAM(s) Oral every 6 hours PRN agitation  fluPHENAZine  Injectable 5 milliGRAM(s) IntraMuscular once PRN agitation  polyethylene glycol 3350 17 Gram(s) Oral daily PRN constipation  
MEDICATIONS  (PRN):  acetaminophen     Tablet .. 650 milliGRAM(s) Oral every 6 hours PRN Mild Pain (1 - 3)  albuterol    90 MICROgram(s) HFA Inhaler 2 Puff(s) Inhalation every 6 hours PRN Bronchospasm  aluminum hydroxide/magnesium hydroxide/simethicone Suspension 30 milliLiter(s) Oral every 4 hours PRN Dyspepsia  bisacodyl 5 milliGRAM(s) Oral daily PRN Constipation  dextrose Oral Gel 15 Gram(s) Oral once PRN hypoglycemia  dextrose Oral Gel 15 Gram(s) Oral once PRN Blood Glucose LESS THAN 70 milliGRAM(s)/deciliter  diphenhydrAMINE Elixir 12.5 milliGRAM(s) Oral every 6 hours PRN eps prevention  diphenhydrAMINE Injectable 25 milliGRAM(s) IntraMuscular once PRN EPS prevention  fluPHENAZine 5 milliGRAM(s) Oral every 6 hours PRN agitation  fluPHENAZine  Injectable 5 milliGRAM(s) IntraMuscular once PRN agitation  polyethylene glycol 3350 17 Gram(s) Oral daily PRN constipation  
MEDICATIONS  (PRN):  acetaminophen     Tablet .. 650 milliGRAM(s) Oral every 6 hours PRN Mild Pain (1 - 3)  albuterol    90 MICROgram(s) HFA Inhaler 2 Puff(s) Inhalation every 6 hours PRN Bronchospasm  aluminum hydroxide/magnesium hydroxide/simethicone Suspension 30 milliLiter(s) Oral every 4 hours PRN Dyspepsia  bisacodyl 5 milliGRAM(s) Oral daily PRN Constipation  dextrose Oral Gel 15 Gram(s) Oral once PRN hypoglycemia  dextrose Oral Gel 15 Gram(s) Oral once PRN Blood Glucose LESS THAN 70 milliGRAM(s)/deciliter  diphenhydrAMINE Elixir 12.5 milliGRAM(s) Oral every 6 hours PRN eps prevention  diphenhydrAMINE Injectable 25 milliGRAM(s) IntraMuscular once PRN EPS prevention  fluPHENAZine 5 milliGRAM(s) Oral every 6 hours PRN agitation  fluPHENAZine  Injectable 5 milliGRAM(s) IntraMuscular once PRN agitation  polyethylene glycol 3350 17 Gram(s) Oral daily PRN constipation  
MEDICATIONS  (PRN):  acetaminophen     Tablet .. 650 milliGRAM(s) Oral every 6 hours PRN Mild Pain (1 - 3)  albuterol    90 MICROgram(s) HFA Inhaler 2 Puff(s) Inhalation every 6 hours PRN Bronchospasm  aluminum hydroxide/magnesium hydroxide/simethicone Suspension 30 milliLiter(s) Oral every 4 hours PRN Dyspepsia  bisacodyl 5 milliGRAM(s) Oral daily PRN Constipation  dextrose Oral Gel 15 Gram(s) Oral once PRN hypoglycemia  dextrose Oral Gel 15 Gram(s) Oral once PRN Blood Glucose LESS THAN 70 milliGRAM(s)/deciliter  diphenhydrAMINE Elixir 12.5 milliGRAM(s) Oral every 6 hours PRN eps prevention  diphenhydrAMINE Injectable 12.5 milliGRAM(s) IntraMuscular once PRN EPS prevention  diphenhydrAMINE Injectable 12.5 milliGRAM(s) IntraMuscular once PRN EPS prevention  fluPHENAZine 5 milliGRAM(s) Oral every 6 hours PRN agitation  fluPHENAZine  Injectable 5 milliGRAM(s) IntraMuscular once PRN agitation  fluPHENAZine  Injectable 5 milliGRAM(s) IntraMuscular once PRN agitation  polyethylene glycol 3350 17 Gram(s) Oral daily PRN constipation  
MEDICATIONS  (PRN):  acetaminophen     Tablet .. 650 milliGRAM(s) Oral every 6 hours PRN Mild Pain (1 - 3)  albuterol    90 MICROgram(s) HFA Inhaler 2 Puff(s) Inhalation every 6 hours PRN Bronchospasm  aluminum hydroxide/magnesium hydroxide/simethicone Suspension 30 milliLiter(s) Oral every 4 hours PRN Dyspepsia  bisacodyl 5 milliGRAM(s) Oral daily PRN Constipation  dextrose Oral Gel 15 Gram(s) Oral once PRN hypoglycemia  dextrose Oral Gel 15 Gram(s) Oral once PRN Blood Glucose LESS THAN 70 milliGRAM(s)/deciliter  diphenhydrAMINE Elixir 12.5 milliGRAM(s) Oral every 6 hours PRN eps prevention  diphenhydrAMINE Injectable 25 milliGRAM(s) IntraMuscular once PRN EPS prevention  fluPHENAZine 5 milliGRAM(s) Oral every 6 hours PRN agitation  fluPHENAZine  Injectable 5 milliGRAM(s) IntraMuscular once PRN agitation  polyethylene glycol 3350 17 Gram(s) Oral daily PRN constipation  
MEDICATIONS  (PRN):  acetaminophen     Tablet .. 650 milliGRAM(s) Oral every 6 hours PRN Mild Pain (1 - 3)  albuterol    90 MICROgram(s) HFA Inhaler 2 Puff(s) Inhalation every 6 hours PRN Bronchospasm  aluminum hydroxide/magnesium hydroxide/simethicone Suspension 30 milliLiter(s) Oral every 4 hours PRN Dyspepsia  bisacodyl 5 milliGRAM(s) Oral daily PRN Constipation  dextrose Oral Gel 15 Gram(s) Oral once PRN hypoglycemia  dextrose Oral Gel 15 Gram(s) Oral once PRN Blood Glucose LESS THAN 70 milliGRAM(s)/deciliter  diphenhydrAMINE Elixir 12.5 milliGRAM(s) Oral every 6 hours PRN eps prevention  diphenhydrAMINE Injectable 25 milliGRAM(s) IntraMuscular once PRN EPS prevention  fluPHENAZine 5 milliGRAM(s) Oral every 6 hours PRN agitation  fluPHENAZine  Injectable 5 milliGRAM(s) IntraMuscular once PRN agitation  polyethylene glycol 3350 17 Gram(s) Oral daily PRN constipation  
MEDICATIONS  (PRN):  acetaminophen     Tablet .. 650 milliGRAM(s) Oral every 6 hours PRN Mild Pain (1 - 3)  albuterol    90 MICROgram(s) HFA Inhaler 2 Puff(s) Inhalation every 6 hours PRN Bronchospasm  aluminum hydroxide/magnesium hydroxide/simethicone Suspension 30 milliLiter(s) Oral every 4 hours PRN Dyspepsia  bisacodyl 5 milliGRAM(s) Oral daily PRN Constipation  dextrose Oral Gel 15 Gram(s) Oral once PRN hypoglycemia  dextrose Oral Gel 15 Gram(s) Oral once PRN Blood Glucose LESS THAN 70 milliGRAM(s)/deciliter  diphenhydrAMINE Elixir 12.5 milliGRAM(s) Oral every 6 hours PRN eps prevention  diphenhydrAMINE Injectable 12.5 milliGRAM(s) IntraMuscular once PRN EPS prevention  fluPHENAZine 5 milliGRAM(s) Oral every 6 hours PRN agitation  fluPHENAZine  Injectable 5 milliGRAM(s) IntraMuscular once PRN agitation  polyethylene glycol 3350 17 Gram(s) Oral daily PRN constipation  
MEDICATIONS  (PRN):  acetaminophen     Tablet .. 650 milliGRAM(s) Oral every 6 hours PRN Mild Pain (1 - 3)  albuterol    90 MICROgram(s) HFA Inhaler 2 Puff(s) Inhalation every 6 hours PRN Bronchospasm  aluminum hydroxide/magnesium hydroxide/simethicone Suspension 30 milliLiter(s) Oral every 4 hours PRN Dyspepsia  bisacodyl 5 milliGRAM(s) Oral daily PRN Constipation  dextrose Oral Gel 15 Gram(s) Oral once PRN hypoglycemia  dextrose Oral Gel 15 Gram(s) Oral once PRN Blood Glucose LESS THAN 70 milliGRAM(s)/deciliter  diphenhydrAMINE Elixir 12.5 milliGRAM(s) Oral every 6 hours PRN eps prevention  diphenhydrAMINE Injectable 12.5 milliGRAM(s) IntraMuscular once PRN EPS prevention  fluPHENAZine 5 milliGRAM(s) Oral every 6 hours PRN agitation  fluPHENAZine  Injectable 5 milliGRAM(s) IntraMuscular once PRN agitation  polyethylene glycol 3350 17 Gram(s) Oral daily PRN constipation  
MEDICATIONS  (PRN):  acetaminophen     Tablet .. 650 milliGRAM(s) Oral every 6 hours PRN Mild Pain (1 - 3)  albuterol    90 MICROgram(s) HFA Inhaler 2 Puff(s) Inhalation every 6 hours PRN Bronchospasm  aluminum hydroxide/magnesium hydroxide/simethicone Suspension 30 milliLiter(s) Oral every 4 hours PRN Dyspepsia  bisacodyl 5 milliGRAM(s) Oral daily PRN Constipation  dextrose Oral Gel 15 Gram(s) Oral once PRN hypoglycemia  dextrose Oral Gel 15 Gram(s) Oral once PRN Blood Glucose LESS THAN 70 milliGRAM(s)/deciliter  diphenhydrAMINE Elixir 12.5 milliGRAM(s) Oral every 6 hours PRN eps prevention  diphenhydrAMINE Injectable 25 milliGRAM(s) IntraMuscular once PRN EPS prevention  fluPHENAZine 5 milliGRAM(s) Oral every 6 hours PRN agitation  fluPHENAZine  Injectable 5 milliGRAM(s) IntraMuscular once PRN agitation  polyethylene glycol 3350 17 Gram(s) Oral daily PRN constipation  
MEDICATIONS  (PRN):  acetaminophen     Tablet .. 650 milliGRAM(s) Oral every 6 hours PRN Mild Pain (1 - 3)  albuterol    90 MICROgram(s) HFA Inhaler 2 Puff(s) Inhalation every 6 hours PRN Bronchospasm  aluminum hydroxide/magnesium hydroxide/simethicone Suspension 30 milliLiter(s) Oral every 4 hours PRN Dyspepsia  dextrose Oral Gel 15 Gram(s) Oral once PRN hypoglycemia  dextrose Oral Gel 15 Gram(s) Oral once PRN Blood Glucose LESS THAN 70 milliGRAM(s)/deciliter  diphenhydrAMINE Elixir 12.5 milliGRAM(s) Oral every 6 hours PRN eps prevention  diphenhydrAMINE Injectable 12.5 milliGRAM(s) IntraMuscular once PRN EPS prevention  fluPHENAZine 5 milliGRAM(s) Oral every 6 hours PRN agitation  fluPHENAZine  Injectable 5 milliGRAM(s) IntraMuscular once PRN agitation  
MEDICATIONS  (PRN):  acetaminophen     Tablet .. 650 milliGRAM(s) Oral every 6 hours PRN Mild Pain (1 - 3)  albuterol    90 MICROgram(s) HFA Inhaler 2 Puff(s) Inhalation every 6 hours PRN Bronchospasm  aluminum hydroxide/magnesium hydroxide/simethicone Suspension 30 milliLiter(s) Oral every 4 hours PRN Dyspepsia  bisacodyl 5 milliGRAM(s) Oral daily PRN Constipation  dextrose Oral Gel 15 Gram(s) Oral once PRN hypoglycemia  dextrose Oral Gel 15 Gram(s) Oral once PRN Blood Glucose LESS THAN 70 milliGRAM(s)/deciliter  diphenhydrAMINE Elixir 12.5 milliGRAM(s) Oral every 6 hours PRN eps prevention  diphenhydrAMINE Injectable 25 milliGRAM(s) IntraMuscular once PRN EPS prevention  fluPHENAZine 5 milliGRAM(s) Oral every 6 hours PRN agitation  fluPHENAZine  Injectable 5 milliGRAM(s) IntraMuscular once PRN agitation  polyethylene glycol 3350 17 Gram(s) Oral daily PRN constipation  
MEDICATIONS  (PRN):  acetaminophen     Tablet .. 650 milliGRAM(s) Oral every 6 hours PRN Mild Pain (1 - 3)  albuterol    90 MICROgram(s) HFA Inhaler 2 Puff(s) Inhalation every 6 hours PRN Bronchospasm  aluminum hydroxide/magnesium hydroxide/simethicone Suspension 30 milliLiter(s) Oral every 4 hours PRN Dyspepsia  bisacodyl 5 milliGRAM(s) Oral daily PRN Constipation  dextrose Oral Gel 15 Gram(s) Oral once PRN Blood Glucose LESS THAN 70 milliGRAM(s)/deciliter  dextrose Oral Gel 15 Gram(s) Oral once PRN hypoglycemia  diphenhydrAMINE Elixir 12.5 milliGRAM(s) Oral every 6 hours PRN eps prevention  diphenhydrAMINE Injectable 12.5 milliGRAM(s) IntraMuscular once PRN EPS prevention  fluPHENAZine 5 milliGRAM(s) Oral every 6 hours PRN agitation  fluPHENAZine  Injectable 5 milliGRAM(s) IntraMuscular once PRN agitation  polyethylene glycol 3350 17 Gram(s) Oral daily PRN constipation  
MEDICATIONS  (PRN):  acetaminophen     Tablet .. 650 milliGRAM(s) Oral every 6 hours PRN Mild Pain (1 - 3)  albuterol    90 MICROgram(s) HFA Inhaler 2 Puff(s) Inhalation every 6 hours PRN Bronchospasm  aluminum hydroxide/magnesium hydroxide/simethicone Suspension 30 milliLiter(s) Oral every 4 hours PRN Dyspepsia  bisacodyl 5 milliGRAM(s) Oral daily PRN Constipation  dextrose Oral Gel 15 Gram(s) Oral once PRN Blood Glucose LESS THAN 70 milliGRAM(s)/deciliter  dextrose Oral Gel 15 Gram(s) Oral once PRN hypoglycemia  diphenhydrAMINE Elixir 12.5 milliGRAM(s) Oral every 6 hours PRN eps prevention  diphenhydrAMINE Injectable 25 milliGRAM(s) IntraMuscular once PRN EPS prevention  fluPHENAZine 5 milliGRAM(s) Oral every 6 hours PRN agitation  fluPHENAZine  Injectable 5 milliGRAM(s) IntraMuscular once PRN agitation  polyethylene glycol 3350 17 Gram(s) Oral daily PRN constipation  
MEDICATIONS  (PRN):  acetaminophen     Tablet .. 650 milliGRAM(s) Oral every 6 hours PRN Mild Pain (1 - 3)  albuterol    90 MICROgram(s) HFA Inhaler 2 Puff(s) Inhalation every 6 hours PRN Bronchospasm  aluminum hydroxide/magnesium hydroxide/simethicone Suspension 30 milliLiter(s) Oral every 4 hours PRN Dyspepsia  bisacodyl 5 milliGRAM(s) Oral daily PRN Constipation  dextrose Oral Gel 15 Gram(s) Oral once PRN hypoglycemia  dextrose Oral Gel 15 Gram(s) Oral once PRN Blood Glucose LESS THAN 70 milliGRAM(s)/deciliter  diphenhydrAMINE Elixir 12.5 milliGRAM(s) Oral every 6 hours PRN eps prevention  diphenhydrAMINE Injectable 12.5 milliGRAM(s) IntraMuscular once PRN EPS prevention  fluPHENAZine 5 milliGRAM(s) Oral every 6 hours PRN agitation  fluPHENAZine  Injectable 5 milliGRAM(s) IntraMuscular once PRN agitation  polyethylene glycol 3350 17 Gram(s) Oral daily PRN constipation  
MEDICATIONS  (PRN):  acetaminophen     Tablet .. 650 milliGRAM(s) Oral every 6 hours PRN Mild Pain (1 - 3)  albuterol    90 MICROgram(s) HFA Inhaler 2 Puff(s) Inhalation every 6 hours PRN Bronchospasm  aluminum hydroxide/magnesium hydroxide/simethicone Suspension 30 milliLiter(s) Oral every 4 hours PRN Dyspepsia  bisacodyl 5 milliGRAM(s) Oral daily PRN Constipation  dextrose Oral Gel 15 Gram(s) Oral once PRN hypoglycemia  dextrose Oral Gel 15 Gram(s) Oral once PRN Blood Glucose LESS THAN 70 milliGRAM(s)/deciliter  diphenhydrAMINE Elixir 12.5 milliGRAM(s) Oral every 6 hours PRN eps prevention  diphenhydrAMINE Injectable 25 milliGRAM(s) IntraMuscular once PRN EPS prevention  fluPHENAZine 5 milliGRAM(s) Oral every 6 hours PRN agitation  fluPHENAZine  Injectable 5 milliGRAM(s) IntraMuscular once PRN agitation  polyethylene glycol 3350 17 Gram(s) Oral daily PRN constipation  
MEDICATIONS  (PRN):  acetaminophen     Tablet .. 650 milliGRAM(s) Oral every 6 hours PRN Mild Pain (1 - 3)  albuterol    90 MICROgram(s) HFA Inhaler 2 Puff(s) Inhalation every 6 hours PRN Bronchospasm  aluminum hydroxide/magnesium hydroxide/simethicone Suspension 30 milliLiter(s) Oral every 4 hours PRN Dyspepsia  bisacodyl 5 milliGRAM(s) Oral daily PRN Constipation  dextrose Oral Gel 15 Gram(s) Oral once PRN Blood Glucose LESS THAN 70 milliGRAM(s)/deciliter  dextrose Oral Gel 15 Gram(s) Oral once PRN hypoglycemia  diphenhydrAMINE Elixir 12.5 milliGRAM(s) Oral every 6 hours PRN eps prevention  diphenhydrAMINE Injectable 12.5 milliGRAM(s) IntraMuscular once PRN EPS prevention  fluPHENAZine 5 milliGRAM(s) Oral every 6 hours PRN agitation  fluPHENAZine  Injectable 5 milliGRAM(s) IntraMuscular once PRN agitation  polyethylene glycol 3350 17 Gram(s) Oral daily PRN constipation  
MEDICATIONS  (PRN):  acetaminophen     Tablet .. 650 milliGRAM(s) Oral every 6 hours PRN Mild Pain (1 - 3)  albuterol    90 MICROgram(s) HFA Inhaler 2 Puff(s) Inhalation every 6 hours PRN Bronchospasm  aluminum hydroxide/magnesium hydroxide/simethicone Suspension 30 milliLiter(s) Oral every 4 hours PRN Dyspepsia  bisacodyl 5 milliGRAM(s) Oral daily PRN Constipation  dextrose Oral Gel 15 Gram(s) Oral once PRN Blood Glucose LESS THAN 70 milliGRAM(s)/deciliter  dextrose Oral Gel 15 Gram(s) Oral once PRN hypoglycemia  diphenhydrAMINE Elixir 12.5 milliGRAM(s) Oral every 6 hours PRN eps prevention  diphenhydrAMINE Injectable 12.5 milliGRAM(s) IntraMuscular once PRN EPS prevention  diphenhydrAMINE Injectable 12.5 milliGRAM(s) IntraMuscular once PRN EPS prevention  fluPHENAZine 5 milliGRAM(s) Oral every 6 hours PRN agitation  fluPHENAZine  Injectable 5 milliGRAM(s) IntraMuscular once PRN agitation  fluPHENAZine  Injectable 5 milliGRAM(s) IntraMuscular once PRN agitation  polyethylene glycol 3350 17 Gram(s) Oral daily PRN constipation  
MEDICATIONS  (PRN):  acetaminophen     Tablet .. 650 milliGRAM(s) Oral every 6 hours PRN Mild Pain (1 - 3)  albuterol    90 MICROgram(s) HFA Inhaler 2 Puff(s) Inhalation every 6 hours PRN Bronchospasm  aluminum hydroxide/magnesium hydroxide/simethicone Suspension 30 milliLiter(s) Oral every 4 hours PRN Dyspepsia  dextrose Oral Gel 15 Gram(s) Oral once PRN Blood Glucose LESS THAN 70 milliGRAM(s)/deciliter  dextrose Oral Gel 15 Gram(s) Oral once PRN hypoglycemia  diphenhydrAMINE Elixir 12.5 milliGRAM(s) Oral every 6 hours PRN eps prevention  diphenhydrAMINE Injectable 12.5 milliGRAM(s) IntraMuscular once PRN EPS prevention  fluPHENAZine 5 milliGRAM(s) Oral every 6 hours PRN agitation  fluPHENAZine  Injectable 5 milliGRAM(s) IntraMuscular once PRN agitation  
MEDICATIONS  (PRN):  acetaminophen     Tablet .. 650 milliGRAM(s) Oral every 6 hours PRN Mild Pain (1 - 3)  albuterol    90 MICROgram(s) HFA Inhaler 2 Puff(s) Inhalation every 6 hours PRN Bronchospasm  aluminum hydroxide/magnesium hydroxide/simethicone Suspension 30 milliLiter(s) Oral every 4 hours PRN Dyspepsia  bisacodyl 5 milliGRAM(s) Oral daily PRN Constipation  dextrose Oral Gel 15 Gram(s) Oral once PRN hypoglycemia  dextrose Oral Gel 15 Gram(s) Oral once PRN Blood Glucose LESS THAN 70 milliGRAM(s)/deciliter  diphenhydrAMINE Elixir 12.5 milliGRAM(s) Oral every 6 hours PRN eps prevention  diphenhydrAMINE Injectable 25 milliGRAM(s) IntraMuscular once PRN EPS prevention  fluPHENAZine 5 milliGRAM(s) Oral every 6 hours PRN agitation  fluPHENAZine  Injectable 5 milliGRAM(s) IntraMuscular once PRN agitation  polyethylene glycol 3350 17 Gram(s) Oral daily PRN constipation  
MEDICATIONS  (PRN):  acetaminophen     Tablet .. 650 milliGRAM(s) Oral every 6 hours PRN Mild Pain (1 - 3)  albuterol    90 MICROgram(s) HFA Inhaler 2 Puff(s) Inhalation every 6 hours PRN Bronchospasm  aluminum hydroxide/magnesium hydroxide/simethicone Suspension 30 milliLiter(s) Oral every 4 hours PRN Dyspepsia  bisacodyl 5 milliGRAM(s) Oral daily PRN Constipation  dextrose Oral Gel 15 Gram(s) Oral once PRN Blood Glucose LESS THAN 70 milliGRAM(s)/deciliter  dextrose Oral Gel 15 Gram(s) Oral once PRN hypoglycemia  diphenhydrAMINE Elixir 12.5 milliGRAM(s) Oral every 6 hours PRN eps prevention  diphenhydrAMINE Injectable 12.5 milliGRAM(s) IntraMuscular once PRN EPS prevention  diphenhydrAMINE Injectable 12.5 milliGRAM(s) IntraMuscular once PRN EPS prevention  fluPHENAZine 5 milliGRAM(s) Oral every 6 hours PRN agitation  fluPHENAZine  Injectable 5 milliGRAM(s) IntraMuscular once PRN agitation  fluPHENAZine  Injectable 5 milliGRAM(s) IntraMuscular once PRN agitation  polyethylene glycol 3350 17 Gram(s) Oral daily PRN constipation  
MEDICATIONS  (PRN):  acetaminophen     Tablet .. 650 milliGRAM(s) Oral every 6 hours PRN Mild Pain (1 - 3)  albuterol    90 MICROgram(s) HFA Inhaler 2 Puff(s) Inhalation every 6 hours PRN Bronchospasm  aluminum hydroxide/magnesium hydroxide/simethicone Suspension 30 milliLiter(s) Oral every 4 hours PRN Dyspepsia  bisacodyl 5 milliGRAM(s) Oral daily PRN Constipation  dextrose Oral Gel 15 Gram(s) Oral once PRN hypoglycemia  dextrose Oral Gel 15 Gram(s) Oral once PRN Blood Glucose LESS THAN 70 milliGRAM(s)/deciliter  diphenhydrAMINE Elixir 12.5 milliGRAM(s) Oral every 6 hours PRN eps prevention  diphenhydrAMINE Injectable 25 milliGRAM(s) IntraMuscular once PRN EPS prevention  fluPHENAZine 5 milliGRAM(s) Oral every 6 hours PRN agitation  fluPHENAZine  Injectable 5 milliGRAM(s) IntraMuscular once PRN agitation  polyethylene glycol 3350 17 Gram(s) Oral daily PRN constipation  
MEDICATIONS  (PRN):  acetaminophen     Tablet .. 650 milliGRAM(s) Oral every 6 hours PRN Mild Pain (1 - 3)  albuterol    90 MICROgram(s) HFA Inhaler 2 Puff(s) Inhalation every 6 hours PRN Bronchospasm  aluminum hydroxide/magnesium hydroxide/simethicone Suspension 30 milliLiter(s) Oral every 4 hours PRN Dyspepsia  bisacodyl 5 milliGRAM(s) Oral daily PRN Constipation  dextrose Oral Gel 15 Gram(s) Oral once PRN hypoglycemia  dextrose Oral Gel 15 Gram(s) Oral once PRN Blood Glucose LESS THAN 70 milliGRAM(s)/deciliter  diphenhydrAMINE Elixir 12.5 milliGRAM(s) Oral every 6 hours PRN eps prevention  diphenhydrAMINE Injectable 25 milliGRAM(s) IntraMuscular once PRN EPS prevention  fluPHENAZine 5 milliGRAM(s) Oral every 6 hours PRN agitation  fluPHENAZine  Injectable 5 milliGRAM(s) IntraMuscular once PRN agitation  polyethylene glycol 3350 17 Gram(s) Oral daily PRN constipation  
MEDICATIONS  (PRN):  acetaminophen     Tablet .. 650 milliGRAM(s) Oral every 6 hours PRN Mild Pain (1 - 3)  albuterol    90 MICROgram(s) HFA Inhaler 2 Puff(s) Inhalation every 6 hours PRN Bronchospasm  aluminum hydroxide/magnesium hydroxide/simethicone Suspension 30 milliLiter(s) Oral every 4 hours PRN Dyspepsia  bisacodyl 5 milliGRAM(s) Oral daily PRN Constipation  dextrose Oral Gel 15 Gram(s) Oral once PRN Blood Glucose LESS THAN 70 milliGRAM(s)/deciliter  dextrose Oral Gel 15 Gram(s) Oral once PRN hypoglycemia  diphenhydrAMINE Elixir 12.5 milliGRAM(s) Oral every 6 hours PRN eps prevention  diphenhydrAMINE Injectable 12.5 milliGRAM(s) IntraMuscular once PRN EPS prevention  diphenhydrAMINE Injectable 12.5 milliGRAM(s) IntraMuscular once PRN EPS prevention  fluPHENAZine 5 milliGRAM(s) Oral every 6 hours PRN agitation  fluPHENAZine  Injectable 5 milliGRAM(s) IntraMuscular once PRN agitation  fluPHENAZine  Injectable 5 milliGRAM(s) IntraMuscular once PRN agitation  polyethylene glycol 3350 17 Gram(s) Oral daily PRN constipation  
MEDICATIONS  (PRN):  acetaminophen     Tablet .. 650 milliGRAM(s) Oral every 6 hours PRN Mild Pain (1 - 3)  albuterol    90 MICROgram(s) HFA Inhaler 2 Puff(s) Inhalation every 6 hours PRN Bronchospasm  aluminum hydroxide/magnesium hydroxide/simethicone Suspension 30 milliLiter(s) Oral every 4 hours PRN Dyspepsia  bisacodyl 5 milliGRAM(s) Oral daily PRN Constipation  dextrose Oral Gel 15 Gram(s) Oral once PRN Blood Glucose LESS THAN 70 milliGRAM(s)/deciliter  dextrose Oral Gel 15 Gram(s) Oral once PRN hypoglycemia  diphenhydrAMINE Elixir 12.5 milliGRAM(s) Oral every 6 hours PRN eps prevention  diphenhydrAMINE Injectable 12.5 milliGRAM(s) IntraMuscular once PRN EPS prevention  diphenhydrAMINE Injectable 12.5 milliGRAM(s) IntraMuscular once PRN EPS prevention  fluPHENAZine 5 milliGRAM(s) Oral every 6 hours PRN agitation  fluPHENAZine  Injectable 5 milliGRAM(s) IntraMuscular once PRN agitation  fluPHENAZine  Injectable 5 milliGRAM(s) IntraMuscular once PRN agitation  polyethylene glycol 3350 17 Gram(s) Oral daily PRN constipation  
MEDICATIONS  (PRN):  acetaminophen     Tablet .. 650 milliGRAM(s) Oral every 6 hours PRN Mild Pain (1 - 3)  albuterol    90 MICROgram(s) HFA Inhaler 2 Puff(s) Inhalation every 6 hours PRN Bronchospasm  aluminum hydroxide/magnesium hydroxide/simethicone Suspension 30 milliLiter(s) Oral every 4 hours PRN Dyspepsia  bisacodyl 5 milliGRAM(s) Oral daily PRN Constipation  dextrose Oral Gel 15 Gram(s) Oral once PRN Blood Glucose LESS THAN 70 milliGRAM(s)/deciliter  dextrose Oral Gel 15 Gram(s) Oral once PRN hypoglycemia  diphenhydrAMINE Elixir 12.5 milliGRAM(s) Oral every 6 hours PRN eps prevention  diphenhydrAMINE Injectable 12.5 milliGRAM(s) IntraMuscular once PRN EPS prevention  fluPHENAZine 5 milliGRAM(s) Oral every 6 hours PRN agitation  fluPHENAZine  Injectable 5 milliGRAM(s) IntraMuscular once PRN agitation  polyethylene glycol 3350 17 Gram(s) Oral daily PRN constipation  
MEDICATIONS  (PRN):  acetaminophen     Tablet .. 650 milliGRAM(s) Oral every 6 hours PRN Mild Pain (1 - 3)  albuterol    90 MICROgram(s) HFA Inhaler 2 Puff(s) Inhalation every 6 hours PRN Bronchospasm  aluminum hydroxide/magnesium hydroxide/simethicone Suspension 30 milliLiter(s) Oral every 4 hours PRN Dyspepsia  bisacodyl 5 milliGRAM(s) Oral daily PRN Constipation  dextrose Oral Gel 15 Gram(s) Oral once PRN Blood Glucose LESS THAN 70 milliGRAM(s)/deciliter  dextrose Oral Gel 15 Gram(s) Oral once PRN hypoglycemia  diphenhydrAMINE Elixir 12.5 milliGRAM(s) Oral every 6 hours PRN eps prevention  diphenhydrAMINE Injectable 12.5 milliGRAM(s) IntraMuscular once PRN EPS prevention  fluPHENAZine 5 milliGRAM(s) Oral every 6 hours PRN agitation  fluPHENAZine  Injectable 5 milliGRAM(s) IntraMuscular once PRN agitation  polyethylene glycol 3350 17 Gram(s) Oral daily PRN constipation  
MEDICATIONS  (PRN):  acetaminophen     Tablet .. 650 milliGRAM(s) Oral every 6 hours PRN Mild Pain (1 - 3)  albuterol    90 MICROgram(s) HFA Inhaler 2 Puff(s) Inhalation every 6 hours PRN Bronchospasm  aluminum hydroxide/magnesium hydroxide/simethicone Suspension 30 milliLiter(s) Oral every 4 hours PRN Dyspepsia  bisacodyl 5 milliGRAM(s) Oral daily PRN Constipation  dextrose Oral Gel 15 Gram(s) Oral once PRN hypoglycemia  dextrose Oral Gel 15 Gram(s) Oral once PRN Blood Glucose LESS THAN 70 milliGRAM(s)/deciliter  diphenhydrAMINE Elixir 12.5 milliGRAM(s) Oral every 6 hours PRN eps prevention  diphenhydrAMINE Injectable 12.5 milliGRAM(s) IntraMuscular once PRN EPS prevention  diphenhydrAMINE Injectable 12.5 milliGRAM(s) IntraMuscular once PRN EPS prevention  fluPHENAZine 5 milliGRAM(s) Oral every 6 hours PRN agitation  fluPHENAZine  Injectable 5 milliGRAM(s) IntraMuscular once PRN agitation  fluPHENAZine  Injectable 5 milliGRAM(s) IntraMuscular once PRN agitation  polyethylene glycol 3350 17 Gram(s) Oral daily PRN constipation  
MEDICATIONS  (PRN):  acetaminophen     Tablet .. 650 milliGRAM(s) Oral every 6 hours PRN Mild Pain (1 - 3)  albuterol    90 MICROgram(s) HFA Inhaler 2 Puff(s) Inhalation every 6 hours PRN Bronchospasm  aluminum hydroxide/magnesium hydroxide/simethicone Suspension 30 milliLiter(s) Oral every 4 hours PRN Dyspepsia  bisacodyl 5 milliGRAM(s) Oral daily PRN Constipation  dextrose Oral Gel 15 Gram(s) Oral once PRN hypoglycemia  dextrose Oral Gel 15 Gram(s) Oral once PRN Blood Glucose LESS THAN 70 milliGRAM(s)/deciliter  diphenhydrAMINE Elixir 12.5 milliGRAM(s) Oral every 6 hours PRN eps prevention  diphenhydrAMINE Injectable 12.5 milliGRAM(s) IntraMuscular once PRN EPS prevention  fluPHENAZine 5 milliGRAM(s) Oral every 6 hours PRN agitation  fluPHENAZine  Injectable 5 milliGRAM(s) IntraMuscular once PRN agitation  polyethylene glycol 3350 17 Gram(s) Oral daily PRN constipation  
MEDICATIONS  (PRN):  acetaminophen     Tablet .. 650 milliGRAM(s) Oral every 6 hours PRN Mild Pain (1 - 3)  albuterol    90 MICROgram(s) HFA Inhaler 2 Puff(s) Inhalation every 6 hours PRN Bronchospasm  aluminum hydroxide/magnesium hydroxide/simethicone Suspension 30 milliLiter(s) Oral every 4 hours PRN Dyspepsia  bisacodyl 5 milliGRAM(s) Oral daily PRN Constipation  dextrose Oral Gel 15 Gram(s) Oral once PRN Blood Glucose LESS THAN 70 milliGRAM(s)/deciliter  dextrose Oral Gel 15 Gram(s) Oral once PRN hypoglycemia  diphenhydrAMINE Elixir 12.5 milliGRAM(s) Oral every 6 hours PRN eps prevention  diphenhydrAMINE Injectable 25 milliGRAM(s) IntraMuscular once PRN EPS prevention  fluPHENAZine 5 milliGRAM(s) Oral every 6 hours PRN agitation  fluPHENAZine  Injectable 5 milliGRAM(s) IntraMuscular once PRN agitation  polyethylene glycol 3350 17 Gram(s) Oral daily PRN constipation  
MEDICATIONS  (PRN):  acetaminophen     Tablet .. 650 milliGRAM(s) Oral every 6 hours PRN Mild Pain (1 - 3)  albuterol    90 MICROgram(s) HFA Inhaler 2 Puff(s) Inhalation every 6 hours PRN Bronchospasm  aluminum hydroxide/magnesium hydroxide/simethicone Suspension 30 milliLiter(s) Oral every 4 hours PRN Dyspepsia  bisacodyl 5 milliGRAM(s) Oral daily PRN Constipation  dextrose Oral Gel 15 Gram(s) Oral once PRN hypoglycemia  dextrose Oral Gel 15 Gram(s) Oral once PRN Blood Glucose LESS THAN 70 milliGRAM(s)/deciliter  diphenhydrAMINE Elixir 12.5 milliGRAM(s) Oral every 6 hours PRN eps prevention  diphenhydrAMINE Injectable 25 milliGRAM(s) IntraMuscular once PRN EPS prevention  fluPHENAZine 5 milliGRAM(s) Oral every 6 hours PRN agitation  fluPHENAZine  Injectable 5 milliGRAM(s) IntraMuscular once PRN agitation  polyethylene glycol 3350 17 Gram(s) Oral daily PRN constipation  
MEDICATIONS  (PRN):  acetaminophen     Tablet .. 650 milliGRAM(s) Oral every 6 hours PRN Mild Pain (1 - 3)  albuterol    90 MICROgram(s) HFA Inhaler 2 Puff(s) Inhalation every 6 hours PRN Bronchospasm  aluminum hydroxide/magnesium hydroxide/simethicone Suspension 30 milliLiter(s) Oral every 4 hours PRN Dyspepsia  dextrose Oral Gel 15 Gram(s) Oral once PRN hypoglycemia  dextrose Oral Gel 15 Gram(s) Oral once PRN Blood Glucose LESS THAN 70 milliGRAM(s)/deciliter  diphenhydrAMINE Elixir 12.5 milliGRAM(s) Oral every 6 hours PRN eps prevention  diphenhydrAMINE Injectable 12.5 milliGRAM(s) IntraMuscular once PRN EPS prevention  fluPHENAZine 5 milliGRAM(s) Oral every 6 hours PRN agitation  fluPHENAZine  Injectable 5 milliGRAM(s) IntraMuscular once PRN agitation  
MEDICATIONS  (PRN):  acetaminophen     Tablet .. 650 milliGRAM(s) Oral every 6 hours PRN Mild Pain (1 - 3)  albuterol    90 MICROgram(s) HFA Inhaler 2 Puff(s) Inhalation every 6 hours PRN Bronchospasm  aluminum hydroxide/magnesium hydroxide/simethicone Suspension 30 milliLiter(s) Oral every 4 hours PRN Dyspepsia  dextrose Oral Gel 15 Gram(s) Oral once PRN hypoglycemia  dextrose Oral Gel 15 Gram(s) Oral once PRN Blood Glucose LESS THAN 70 milliGRAM(s)/deciliter  diphenhydrAMINE Elixir 12.5 milliGRAM(s) Oral every 6 hours PRN eps prevention  diphenhydrAMINE Injectable 12.5 milliGRAM(s) IntraMuscular once PRN EPS prevention  fluPHENAZine 5 milliGRAM(s) Oral every 6 hours PRN agitation  fluPHENAZine  Injectable 5 milliGRAM(s) IntraMuscular once PRN agitation  
MEDICATIONS  (PRN):  acetaminophen     Tablet .. 650 milliGRAM(s) Oral every 6 hours PRN Mild Pain (1 - 3)  albuterol    90 MICROgram(s) HFA Inhaler 2 Puff(s) Inhalation every 6 hours PRN Bronchospasm  aluminum hydroxide/magnesium hydroxide/simethicone Suspension 30 milliLiter(s) Oral every 4 hours PRN Dyspepsia  bisacodyl 5 milliGRAM(s) Oral daily PRN Constipation  dextrose Oral Gel 15 Gram(s) Oral once PRN hypoglycemia  dextrose Oral Gel 15 Gram(s) Oral once PRN Blood Glucose LESS THAN 70 milliGRAM(s)/deciliter  diphenhydrAMINE Elixir 12.5 milliGRAM(s) Oral every 6 hours PRN eps prevention  diphenhydrAMINE Injectable 12.5 milliGRAM(s) IntraMuscular once PRN EPS prevention  fluPHENAZine 5 milliGRAM(s) Oral every 6 hours PRN agitation  fluPHENAZine  Injectable 5 milliGRAM(s) IntraMuscular once PRN agitation  polyethylene glycol 3350 17 Gram(s) Oral daily PRN constipation  
MEDICATIONS  (PRN):  acetaminophen     Tablet .. 650 milliGRAM(s) Oral every 6 hours PRN Mild Pain (1 - 3)  albuterol    90 MICROgram(s) HFA Inhaler 2 Puff(s) Inhalation every 6 hours PRN Bronchospasm  aluminum hydroxide/magnesium hydroxide/simethicone Suspension 30 milliLiter(s) Oral every 4 hours PRN Dyspepsia  dextrose Oral Gel 15 Gram(s) Oral once PRN hypoglycemia  dextrose Oral Gel 15 Gram(s) Oral once PRN Blood Glucose LESS THAN 70 milliGRAM(s)/deciliter  diphenhydrAMINE Elixir 12.5 milliGRAM(s) Oral every 6 hours PRN eps prevention  diphenhydrAMINE Injectable 12.5 milliGRAM(s) IntraMuscular once PRN EPS prevention  fluPHENAZine 5 milliGRAM(s) Oral every 6 hours PRN agitation  fluPHENAZine  Injectable 5 milliGRAM(s) IntraMuscular once PRN agitation  
MEDICATIONS  (PRN):  acetaminophen     Tablet .. 650 milliGRAM(s) Oral every 6 hours PRN Mild Pain (1 - 3)  albuterol    90 MICROgram(s) HFA Inhaler 2 Puff(s) Inhalation every 6 hours PRN Bronchospasm  aluminum hydroxide/magnesium hydroxide/simethicone Suspension 30 milliLiter(s) Oral every 4 hours PRN Dyspepsia  dextrose Oral Gel 15 Gram(s) Oral once PRN hypoglycemia  dextrose Oral Gel 15 Gram(s) Oral once PRN Blood Glucose LESS THAN 70 milliGRAM(s)/deciliter  diphenhydrAMINE Elixir 12.5 milliGRAM(s) Oral every 6 hours PRN eps prevention  diphenhydrAMINE Injectable 12.5 milliGRAM(s) IntraMuscular once PRN EPS prevention  fluPHENAZine 5 milliGRAM(s) Oral every 6 hours PRN agitation  fluPHENAZine  Injectable 5 milliGRAM(s) IntraMuscular once PRN agitation  
MEDICATIONS  (PRN):  acetaminophen     Tablet .. 650 milliGRAM(s) Oral every 6 hours PRN Mild Pain (1 - 3)  albuterol    90 MICROgram(s) HFA Inhaler 2 Puff(s) Inhalation every 6 hours PRN Bronchospasm  aluminum hydroxide/magnesium hydroxide/simethicone Suspension 30 milliLiter(s) Oral every 4 hours PRN Dyspepsia  bisacodyl 5 milliGRAM(s) Oral daily PRN Constipation  dextrose Oral Gel 15 Gram(s) Oral once PRN hypoglycemia  dextrose Oral Gel 15 Gram(s) Oral once PRN Blood Glucose LESS THAN 70 milliGRAM(s)/deciliter  diphenhydrAMINE Elixir 12.5 milliGRAM(s) Oral every 6 hours PRN eps prevention  diphenhydrAMINE Injectable 12.5 milliGRAM(s) IntraMuscular once PRN EPS prevention  diphenhydrAMINE Injectable 12.5 milliGRAM(s) IntraMuscular once PRN EPS prevention  fluPHENAZine 5 milliGRAM(s) Oral every 6 hours PRN agitation  fluPHENAZine  Injectable 5 milliGRAM(s) IntraMuscular once PRN agitation  fluPHENAZine  Injectable 5 milliGRAM(s) IntraMuscular once PRN agitation  polyethylene glycol 3350 17 Gram(s) Oral daily PRN constipation  
MEDICATIONS  (PRN):  acetaminophen     Tablet .. 650 milliGRAM(s) Oral every 6 hours PRN Mild Pain (1 - 3)  albuterol    90 MICROgram(s) HFA Inhaler 2 Puff(s) Inhalation every 6 hours PRN Bronchospasm  aluminum hydroxide/magnesium hydroxide/simethicone Suspension 30 milliLiter(s) Oral every 4 hours PRN Dyspepsia  dextrose Oral Gel 15 Gram(s) Oral once PRN hypoglycemia  dextrose Oral Gel 15 Gram(s) Oral once PRN Blood Glucose LESS THAN 70 milliGRAM(s)/deciliter  diphenhydrAMINE Elixir 12.5 milliGRAM(s) Oral every 6 hours PRN eps prevention  diphenhydrAMINE Injectable 12.5 milliGRAM(s) IntraMuscular once PRN EPS prevention  fluPHENAZine 5 milliGRAM(s) Oral every 6 hours PRN agitation  fluPHENAZine  Injectable 5 milliGRAM(s) IntraMuscular once PRN agitation  
MEDICATIONS  (PRN):  acetaminophen     Tablet .. 650 milliGRAM(s) Oral every 6 hours PRN Mild Pain (1 - 3)  albuterol    90 MICROgram(s) HFA Inhaler 2 Puff(s) Inhalation every 6 hours PRN Bronchospasm  aluminum hydroxide/magnesium hydroxide/simethicone Suspension 30 milliLiter(s) Oral every 4 hours PRN Dyspepsia  bisacodyl 5 milliGRAM(s) Oral daily PRN Constipation  dextrose Oral Gel 15 Gram(s) Oral once PRN Blood Glucose LESS THAN 70 milliGRAM(s)/deciliter  dextrose Oral Gel 15 Gram(s) Oral once PRN hypoglycemia  diphenhydrAMINE Elixir 12.5 milliGRAM(s) Oral every 6 hours PRN eps prevention  diphenhydrAMINE Injectable 25 milliGRAM(s) IntraMuscular once PRN EPS prevention  fluPHENAZine 5 milliGRAM(s) Oral every 6 hours PRN agitation  fluPHENAZine  Injectable 5 milliGRAM(s) IntraMuscular once PRN agitation  polyethylene glycol 3350 17 Gram(s) Oral daily PRN constipation  
MEDICATIONS  (PRN):  acetaminophen     Tablet .. 650 milliGRAM(s) Oral every 6 hours PRN Mild Pain (1 - 3)  albuterol    90 MICROgram(s) HFA Inhaler 2 Puff(s) Inhalation every 6 hours PRN Bronchospasm  aluminum hydroxide/magnesium hydroxide/simethicone Suspension 30 milliLiter(s) Oral every 4 hours PRN Dyspepsia  bisacodyl 5 milliGRAM(s) Oral daily PRN Constipation  dextrose Oral Gel 15 Gram(s) Oral once PRN hypoglycemia  dextrose Oral Gel 15 Gram(s) Oral once PRN Blood Glucose LESS THAN 70 milliGRAM(s)/deciliter  diphenhydrAMINE Elixir 12.5 milliGRAM(s) Oral every 6 hours PRN eps prevention  diphenhydrAMINE Injectable 12.5 milliGRAM(s) IntraMuscular once PRN EPS prevention  fluPHENAZine 5 milliGRAM(s) Oral every 6 hours PRN agitation  fluPHENAZine  Injectable 5 milliGRAM(s) IntraMuscular once PRN agitation  polyethylene glycol 3350 17 Gram(s) Oral daily PRN constipation  
MEDICATIONS  (PRN):  acetaminophen     Tablet .. 650 milliGRAM(s) Oral every 6 hours PRN Mild Pain (1 - 3)  albuterol    90 MICROgram(s) HFA Inhaler 2 Puff(s) Inhalation every 6 hours PRN Bronchospasm  aluminum hydroxide/magnesium hydroxide/simethicone Suspension 30 milliLiter(s) Oral every 4 hours PRN Dyspepsia  dextrose Oral Gel 15 Gram(s) Oral once PRN hypoglycemia  dextrose Oral Gel 15 Gram(s) Oral once PRN Blood Glucose LESS THAN 70 milliGRAM(s)/deciliter  diphenhydrAMINE Elixir 12.5 milliGRAM(s) Oral every 6 hours PRN eps prevention  diphenhydrAMINE Injectable 12.5 milliGRAM(s) IntraMuscular once PRN EPS prevention  fluPHENAZine 5 milliGRAM(s) Oral every 6 hours PRN agitation  fluPHENAZine  Injectable 5 milliGRAM(s) IntraMuscular once PRN agitation  
MEDICATIONS  (PRN):  acetaminophen     Tablet .. 650 milliGRAM(s) Oral every 6 hours PRN Mild Pain (1 - 3)  albuterol    90 MICROgram(s) HFA Inhaler 2 Puff(s) Inhalation every 6 hours PRN Bronchospasm  aluminum hydroxide/magnesium hydroxide/simethicone Suspension 30 milliLiter(s) Oral every 4 hours PRN Dyspepsia  bisacodyl 5 milliGRAM(s) Oral daily PRN Constipation  dextrose Oral Gel 15 Gram(s) Oral once PRN hypoglycemia  dextrose Oral Gel 15 Gram(s) Oral once PRN Blood Glucose LESS THAN 70 milliGRAM(s)/deciliter  diphenhydrAMINE Elixir 12.5 milliGRAM(s) Oral every 6 hours PRN eps prevention  diphenhydrAMINE Injectable 12.5 milliGRAM(s) IntraMuscular once PRN EPS prevention  diphenhydrAMINE Injectable 12.5 milliGRAM(s) IntraMuscular once PRN EPS prevention  fluPHENAZine 5 milliGRAM(s) Oral every 6 hours PRN agitation  fluPHENAZine  Injectable 5 milliGRAM(s) IntraMuscular once PRN agitation  fluPHENAZine  Injectable 5 milliGRAM(s) IntraMuscular once PRN agitation  polyethylene glycol 3350 17 Gram(s) Oral daily PRN constipation  
MEDICATIONS  (PRN):  acetaminophen     Tablet .. 650 milliGRAM(s) Oral every 6 hours PRN Mild Pain (1 - 3)  albuterol    90 MICROgram(s) HFA Inhaler 2 Puff(s) Inhalation every 6 hours PRN Bronchospasm  aluminum hydroxide/magnesium hydroxide/simethicone Suspension 30 milliLiter(s) Oral every 4 hours PRN Dyspepsia  dextrose Oral Gel 15 Gram(s) Oral once PRN hypoglycemia  dextrose Oral Gel 15 Gram(s) Oral once PRN Blood Glucose LESS THAN 70 milliGRAM(s)/deciliter  diphenhydrAMINE Elixir 12.5 milliGRAM(s) Oral every 6 hours PRN eps prevention  diphenhydrAMINE Injectable 12.5 milliGRAM(s) IntraMuscular once PRN EPS prevention  fluPHENAZine 5 milliGRAM(s) Oral every 6 hours PRN agitation  fluPHENAZine  Injectable 5 milliGRAM(s) IntraMuscular once PRN agitation  
MEDICATIONS  (PRN):  acetaminophen     Tablet .. 650 milliGRAM(s) Oral every 6 hours PRN Mild Pain (1 - 3)  albuterol    90 MICROgram(s) HFA Inhaler 2 Puff(s) Inhalation every 6 hours PRN Bronchospasm  aluminum hydroxide/magnesium hydroxide/simethicone Suspension 30 milliLiter(s) Oral every 4 hours PRN Dyspepsia  dextrose Oral Gel 15 Gram(s) Oral once PRN hypoglycemia  dextrose Oral Gel 15 Gram(s) Oral once PRN Blood Glucose LESS THAN 70 milliGRAM(s)/deciliter  diphenhydrAMINE Elixir 12.5 milliGRAM(s) Oral every 6 hours PRN eps prevention  diphenhydrAMINE Injectable 12.5 milliGRAM(s) IntraMuscular once PRN EPS prevention  fluPHENAZine 5 milliGRAM(s) Oral every 6 hours PRN agitation  fluPHENAZine  Injectable 5 milliGRAM(s) IntraMuscular once PRN agitation  
MEDICATIONS  (PRN):  acetaminophen     Tablet .. 650 milliGRAM(s) Oral every 6 hours PRN Mild Pain (1 - 3)  albuterol    90 MICROgram(s) HFA Inhaler 2 Puff(s) Inhalation every 6 hours PRN Bronchospasm  aluminum hydroxide/magnesium hydroxide/simethicone Suspension 30 milliLiter(s) Oral every 4 hours PRN Dyspepsia  bisacodyl 5 milliGRAM(s) Oral daily PRN Constipation  dextrose Oral Gel 15 Gram(s) Oral once PRN Blood Glucose LESS THAN 70 milliGRAM(s)/deciliter  dextrose Oral Gel 15 Gram(s) Oral once PRN hypoglycemia  diphenhydrAMINE Elixir 12.5 milliGRAM(s) Oral every 6 hours PRN eps prevention  diphenhydrAMINE Injectable 12.5 milliGRAM(s) IntraMuscular once PRN EPS prevention  fluPHENAZine 5 milliGRAM(s) Oral every 6 hours PRN agitation  fluPHENAZine  Injectable 5 milliGRAM(s) IntraMuscular once PRN agitation  polyethylene glycol 3350 17 Gram(s) Oral daily PRN constipation  
MEDICATIONS  (PRN):  acetaminophen     Tablet .. 650 milliGRAM(s) Oral every 6 hours PRN Mild Pain (1 - 3)  albuterol    90 MICROgram(s) HFA Inhaler 2 Puff(s) Inhalation every 6 hours PRN Bronchospasm  aluminum hydroxide/magnesium hydroxide/simethicone Suspension 30 milliLiter(s) Oral every 4 hours PRN Dyspepsia  bisacodyl 5 milliGRAM(s) Oral daily PRN Constipation  dextrose Oral Gel 15 Gram(s) Oral once PRN Blood Glucose LESS THAN 70 milliGRAM(s)/deciliter  dextrose Oral Gel 15 Gram(s) Oral once PRN hypoglycemia  diphenhydrAMINE Elixir 12.5 milliGRAM(s) Oral every 6 hours PRN eps prevention  diphenhydrAMINE Injectable 12.5 milliGRAM(s) IntraMuscular once PRN EPS prevention  fluPHENAZine 5 milliGRAM(s) Oral every 6 hours PRN agitation  fluPHENAZine  Injectable 5 milliGRAM(s) IntraMuscular once PRN agitation  polyethylene glycol 3350 17 Gram(s) Oral daily PRN constipation  
MEDICATIONS  (PRN):  acetaminophen     Tablet .. 650 milliGRAM(s) Oral every 6 hours PRN Mild Pain (1 - 3)  albuterol    90 MICROgram(s) HFA Inhaler 2 Puff(s) Inhalation every 6 hours PRN Bronchospasm  aluminum hydroxide/magnesium hydroxide/simethicone Suspension 30 milliLiter(s) Oral every 4 hours PRN Dyspepsia  bisacodyl 5 milliGRAM(s) Oral daily PRN Constipation  dextrose Oral Gel 15 Gram(s) Oral once PRN hypoglycemia  dextrose Oral Gel 15 Gram(s) Oral once PRN Blood Glucose LESS THAN 70 milliGRAM(s)/deciliter  diphenhydrAMINE Elixir 12.5 milliGRAM(s) Oral every 6 hours PRN eps prevention  diphenhydrAMINE Injectable 25 milliGRAM(s) IntraMuscular once PRN EPS prevention  fluPHENAZine 5 milliGRAM(s) Oral every 6 hours PRN agitation  fluPHENAZine  Injectable 5 milliGRAM(s) IntraMuscular once PRN agitation  polyethylene glycol 3350 17 Gram(s) Oral daily PRN constipation  
MEDICATIONS  (PRN):  acetaminophen     Tablet .. 650 milliGRAM(s) Oral every 6 hours PRN Mild Pain (1 - 3)  albuterol    90 MICROgram(s) HFA Inhaler 2 Puff(s) Inhalation every 6 hours PRN Bronchospasm  aluminum hydroxide/magnesium hydroxide/simethicone Suspension 30 milliLiter(s) Oral every 4 hours PRN Dyspepsia  bisacodyl 5 milliGRAM(s) Oral daily PRN Constipation  dextrose Oral Gel 15 Gram(s) Oral once PRN hypoglycemia  dextrose Oral Gel 15 Gram(s) Oral once PRN Blood Glucose LESS THAN 70 milliGRAM(s)/deciliter  diphenhydrAMINE Elixir 12.5 milliGRAM(s) Oral every 6 hours PRN eps prevention  diphenhydrAMINE Injectable 25 milliGRAM(s) IntraMuscular once PRN EPS prevention  fluPHENAZine 5 milliGRAM(s) Oral every 6 hours PRN agitation  fluPHENAZine  Injectable 5 milliGRAM(s) IntraMuscular once PRN agitation  polyethylene glycol 3350 17 Gram(s) Oral daily PRN constipation  
MEDICATIONS  (PRN):  acetaminophen     Tablet .. 650 milliGRAM(s) Oral every 6 hours PRN Mild Pain (1 - 3)  albuterol    90 MICROgram(s) HFA Inhaler 2 Puff(s) Inhalation every 6 hours PRN Bronchospasm  aluminum hydroxide/magnesium hydroxide/simethicone Suspension 30 milliLiter(s) Oral every 4 hours PRN Dyspepsia  bisacodyl 5 milliGRAM(s) Oral daily PRN Constipation  dextrose Oral Gel 15 Gram(s) Oral once PRN Blood Glucose LESS THAN 70 milliGRAM(s)/deciliter  dextrose Oral Gel 15 Gram(s) Oral once PRN hypoglycemia  diphenhydrAMINE Elixir 12.5 milliGRAM(s) Oral every 6 hours PRN eps prevention  diphenhydrAMINE Injectable 12.5 milliGRAM(s) IntraMuscular once PRN EPS prevention  diphenhydrAMINE Injectable 12.5 milliGRAM(s) IntraMuscular once PRN EPS prevention  fluPHENAZine 5 milliGRAM(s) Oral every 6 hours PRN agitation  fluPHENAZine  Injectable 5 milliGRAM(s) IntraMuscular once PRN agitation  fluPHENAZine  Injectable 5 milliGRAM(s) IntraMuscular once PRN agitation  polyethylene glycol 3350 17 Gram(s) Oral daily PRN constipation  
MEDICATIONS  (PRN):  acetaminophen     Tablet .. 650 milliGRAM(s) Oral every 6 hours PRN Mild Pain (1 - 3)  albuterol    90 MICROgram(s) HFA Inhaler 2 Puff(s) Inhalation every 6 hours PRN Bronchospasm  aluminum hydroxide/magnesium hydroxide/simethicone Suspension 30 milliLiter(s) Oral every 4 hours PRN Dyspepsia  bisacodyl 5 milliGRAM(s) Oral daily PRN Constipation  dextrose Oral Gel 15 Gram(s) Oral once PRN hypoglycemia  dextrose Oral Gel 15 Gram(s) Oral once PRN Blood Glucose LESS THAN 70 milliGRAM(s)/deciliter  diphenhydrAMINE Elixir 12.5 milliGRAM(s) Oral every 6 hours PRN eps prevention  diphenhydrAMINE Injectable 25 milliGRAM(s) IntraMuscular once PRN EPS prevention  fluPHENAZine 5 milliGRAM(s) Oral every 6 hours PRN agitation  fluPHENAZine  Injectable 5 milliGRAM(s) IntraMuscular once PRN agitation  polyethylene glycol 3350 17 Gram(s) Oral daily PRN constipation  
MEDICATIONS  (PRN):  acetaminophen     Tablet .. 650 milliGRAM(s) Oral every 6 hours PRN Mild Pain (1 - 3)  albuterol    90 MICROgram(s) HFA Inhaler 2 Puff(s) Inhalation every 6 hours PRN Bronchospasm  aluminum hydroxide/magnesium hydroxide/simethicone Suspension 30 milliLiter(s) Oral every 4 hours PRN Dyspepsia  dextrose Oral Gel 15 Gram(s) Oral once PRN Blood Glucose LESS THAN 70 milliGRAM(s)/deciliter  dextrose Oral Gel 15 Gram(s) Oral once PRN hypoglycemia  diphenhydrAMINE Elixir 12.5 milliGRAM(s) Oral every 6 hours PRN eps prevention  diphenhydrAMINE Injectable 12.5 milliGRAM(s) IntraMuscular once PRN EPS prevention  fluPHENAZine 5 milliGRAM(s) Oral every 6 hours PRN agitation  fluPHENAZine  Injectable 5 milliGRAM(s) IntraMuscular once PRN agitation  
MEDICATIONS  (PRN):  acetaminophen     Tablet .. 650 milliGRAM(s) Oral every 6 hours PRN Mild Pain (1 - 3)  albuterol    90 MICROgram(s) HFA Inhaler 2 Puff(s) Inhalation every 6 hours PRN Bronchospasm  aluminum hydroxide/magnesium hydroxide/simethicone Suspension 30 milliLiter(s) Oral every 4 hours PRN Dyspepsia  bisacodyl 5 milliGRAM(s) Oral daily PRN Constipation  dextrose Oral Gel 15 Gram(s) Oral once PRN Blood Glucose LESS THAN 70 milliGRAM(s)/deciliter  dextrose Oral Gel 15 Gram(s) Oral once PRN hypoglycemia  diphenhydrAMINE Elixir 12.5 milliGRAM(s) Oral every 6 hours PRN eps prevention  diphenhydrAMINE Injectable 12.5 milliGRAM(s) IntraMuscular once PRN EPS prevention  diphenhydrAMINE Injectable 12.5 milliGRAM(s) IntraMuscular once PRN EPS prevention  fluPHENAZine 5 milliGRAM(s) Oral every 6 hours PRN agitation  fluPHENAZine  Injectable 5 milliGRAM(s) IntraMuscular once PRN agitation  fluPHENAZine  Injectable 5 milliGRAM(s) IntraMuscular once PRN agitation  polyethylene glycol 3350 17 Gram(s) Oral daily PRN constipation  
MEDICATIONS  (PRN):  acetaminophen     Tablet .. 650 milliGRAM(s) Oral every 6 hours PRN Mild Pain (1 - 3)  albuterol    90 MICROgram(s) HFA Inhaler 2 Puff(s) Inhalation every 6 hours PRN Bronchospasm  aluminum hydroxide/magnesium hydroxide/simethicone Suspension 30 milliLiter(s) Oral every 4 hours PRN Dyspepsia  bisacodyl 5 milliGRAM(s) Oral daily PRN Constipation  dextrose Oral Gel 15 Gram(s) Oral once PRN hypoglycemia  dextrose Oral Gel 15 Gram(s) Oral once PRN Blood Glucose LESS THAN 70 milliGRAM(s)/deciliter  diphenhydrAMINE Elixir 12.5 milliGRAM(s) Oral every 6 hours PRN eps prevention  diphenhydrAMINE Injectable 25 milliGRAM(s) IntraMuscular once PRN EPS prevention  fluPHENAZine 5 milliGRAM(s) Oral every 6 hours PRN agitation  fluPHENAZine  Injectable 5 milliGRAM(s) IntraMuscular once PRN agitation  polyethylene glycol 3350 17 Gram(s) Oral daily PRN constipation  
MEDICATIONS  (PRN):  acetaminophen     Tablet .. 650 milliGRAM(s) Oral every 6 hours PRN Mild Pain (1 - 3)  albuterol    90 MICROgram(s) HFA Inhaler 2 Puff(s) Inhalation every 6 hours PRN Bronchospasm  aluminum hydroxide/magnesium hydroxide/simethicone Suspension 30 milliLiter(s) Oral every 4 hours PRN Dyspepsia  bisacodyl 5 milliGRAM(s) Oral daily PRN Constipation  dextrose Oral Gel 15 Gram(s) Oral once PRN hypoglycemia  dextrose Oral Gel 15 Gram(s) Oral once PRN Blood Glucose LESS THAN 70 milliGRAM(s)/deciliter  diphenhydrAMINE Elixir 12.5 milliGRAM(s) Oral every 6 hours PRN eps prevention  diphenhydrAMINE Injectable 25 milliGRAM(s) IntraMuscular once PRN EPS prevention  fluPHENAZine 5 milliGRAM(s) Oral every 6 hours PRN agitation  fluPHENAZine  Injectable 5 milliGRAM(s) IntraMuscular once PRN agitation  polyethylene glycol 3350 17 Gram(s) Oral daily PRN constipation  
MEDICATIONS  (PRN):  acetaminophen     Tablet .. 650 milliGRAM(s) Oral every 6 hours PRN Mild Pain (1 - 3)  albuterol    90 MICROgram(s) HFA Inhaler 2 Puff(s) Inhalation every 6 hours PRN Bronchospasm  aluminum hydroxide/magnesium hydroxide/simethicone Suspension 30 milliLiter(s) Oral every 4 hours PRN Dyspepsia  bisacodyl 5 milliGRAM(s) Oral daily PRN Constipation  dextrose Oral Gel 15 Gram(s) Oral once PRN Blood Glucose LESS THAN 70 milliGRAM(s)/deciliter  dextrose Oral Gel 15 Gram(s) Oral once PRN hypoglycemia  diphenhydrAMINE Elixir 12.5 milliGRAM(s) Oral every 6 hours PRN eps prevention  diphenhydrAMINE Injectable 12.5 milliGRAM(s) IntraMuscular once PRN EPS prevention  diphenhydrAMINE Injectable 12.5 milliGRAM(s) IntraMuscular once PRN EPS prevention  fluPHENAZine 5 milliGRAM(s) Oral every 6 hours PRN agitation  fluPHENAZine  Injectable 5 milliGRAM(s) IntraMuscular once PRN agitation  fluPHENAZine  Injectable 5 milliGRAM(s) IntraMuscular once PRN agitation  polyethylene glycol 3350 17 Gram(s) Oral daily PRN constipation  
MEDICATIONS  (PRN):  acetaminophen     Tablet .. 650 milliGRAM(s) Oral every 6 hours PRN Mild Pain (1 - 3)  albuterol    90 MICROgram(s) HFA Inhaler 2 Puff(s) Inhalation every 6 hours PRN Bronchospasm  aluminum hydroxide/magnesium hydroxide/simethicone Suspension 30 milliLiter(s) Oral every 4 hours PRN Dyspepsia  bisacodyl 5 milliGRAM(s) Oral daily PRN Constipation  dextrose Oral Gel 15 Gram(s) Oral once PRN Blood Glucose LESS THAN 70 milliGRAM(s)/deciliter  dextrose Oral Gel 15 Gram(s) Oral once PRN hypoglycemia  diphenhydrAMINE Elixir 12.5 milliGRAM(s) Oral every 6 hours PRN eps prevention  diphenhydrAMINE Injectable 12.5 milliGRAM(s) IntraMuscular once PRN EPS prevention  diphenhydrAMINE Injectable 12.5 milliGRAM(s) IntraMuscular once PRN EPS prevention  fluPHENAZine 5 milliGRAM(s) Oral every 6 hours PRN agitation  fluPHENAZine  Injectable 5 milliGRAM(s) IntraMuscular once PRN agitation  fluPHENAZine  Injectable 5 milliGRAM(s) IntraMuscular once PRN agitation  polyethylene glycol 3350 17 Gram(s) Oral daily PRN constipation  
MEDICATIONS  (PRN):  acetaminophen     Tablet .. 650 milliGRAM(s) Oral every 6 hours PRN Mild Pain (1 - 3)  albuterol    90 MICROgram(s) HFA Inhaler 2 Puff(s) Inhalation every 6 hours PRN Bronchospasm  aluminum hydroxide/magnesium hydroxide/simethicone Suspension 30 milliLiter(s) Oral every 4 hours PRN Dyspepsia  bisacodyl 5 milliGRAM(s) Oral daily PRN Constipation  dextrose Oral Gel 15 Gram(s) Oral once PRN hypoglycemia  dextrose Oral Gel 15 Gram(s) Oral once PRN Blood Glucose LESS THAN 70 milliGRAM(s)/deciliter  diphenhydrAMINE Elixir 12.5 milliGRAM(s) Oral every 6 hours PRN eps prevention  diphenhydrAMINE Injectable 12.5 milliGRAM(s) IntraMuscular once PRN EPS prevention  fluPHENAZine 5 milliGRAM(s) Oral every 6 hours PRN agitation  fluPHENAZine  Injectable 5 milliGRAM(s) IntraMuscular once PRN agitation  polyethylene glycol 3350 17 Gram(s) Oral daily PRN constipation  
MEDICATIONS  (PRN):  acetaminophen     Tablet .. 650 milliGRAM(s) Oral every 6 hours PRN Mild Pain (1 - 3)  albuterol    90 MICROgram(s) HFA Inhaler 2 Puff(s) Inhalation every 6 hours PRN Bronchospasm  aluminum hydroxide/magnesium hydroxide/simethicone Suspension 30 milliLiter(s) Oral every 4 hours PRN Dyspepsia  bisacodyl 5 milliGRAM(s) Oral daily PRN Constipation  dextrose Oral Gel 15 Gram(s) Oral once PRN hypoglycemia  dextrose Oral Gel 15 Gram(s) Oral once PRN Blood Glucose LESS THAN 70 milliGRAM(s)/deciliter  diphenhydrAMINE Elixir 12.5 milliGRAM(s) Oral every 6 hours PRN eps prevention  diphenhydrAMINE Injectable 25 milliGRAM(s) IntraMuscular once PRN EPS prevention  fluPHENAZine 5 milliGRAM(s) Oral every 6 hours PRN agitation  fluPHENAZine  Injectable 5 milliGRAM(s) IntraMuscular once PRN agitation  polyethylene glycol 3350 17 Gram(s) Oral daily PRN constipation  
MEDICATIONS  (PRN):  acetaminophen     Tablet .. 650 milliGRAM(s) Oral every 6 hours PRN Mild Pain (1 - 3)  albuterol    90 MICROgram(s) HFA Inhaler 2 Puff(s) Inhalation every 6 hours PRN Bronchospasm  aluminum hydroxide/magnesium hydroxide/simethicone Suspension 30 milliLiter(s) Oral every 4 hours PRN Dyspepsia  dextrose Oral Gel 15 Gram(s) Oral once PRN Blood Glucose LESS THAN 70 milliGRAM(s)/deciliter  dextrose Oral Gel 15 Gram(s) Oral once PRN hypoglycemia  diphenhydrAMINE Elixir 12.5 milliGRAM(s) Oral every 6 hours PRN eps prevention  diphenhydrAMINE Injectable 12.5 milliGRAM(s) IntraMuscular once PRN EPS prevention  fluPHENAZine 5 milliGRAM(s) Oral every 6 hours PRN agitation  fluPHENAZine  Injectable 5 milliGRAM(s) IntraMuscular once PRN agitation  
MEDICATIONS  (PRN):  acetaminophen     Tablet .. 650 milliGRAM(s) Oral every 6 hours PRN Mild Pain (1 - 3)  albuterol    90 MICROgram(s) HFA Inhaler 2 Puff(s) Inhalation every 6 hours PRN Bronchospasm  aluminum hydroxide/magnesium hydroxide/simethicone Suspension 30 milliLiter(s) Oral every 4 hours PRN Dyspepsia  bisacodyl 5 milliGRAM(s) Oral daily PRN Constipation  dextrose Oral Gel 15 Gram(s) Oral once PRN hypoglycemia  dextrose Oral Gel 15 Gram(s) Oral once PRN Blood Glucose LESS THAN 70 milliGRAM(s)/deciliter  diphenhydrAMINE Elixir 12.5 milliGRAM(s) Oral every 6 hours PRN eps prevention  diphenhydrAMINE Injectable 25 milliGRAM(s) IntraMuscular once PRN EPS prevention  fluPHENAZine 5 milliGRAM(s) Oral every 6 hours PRN agitation  fluPHENAZine  Injectable 5 milliGRAM(s) IntraMuscular once PRN agitation  polyethylene glycol 3350 17 Gram(s) Oral daily PRN constipation  
MEDICATIONS  (PRN):  acetaminophen     Tablet .. 650 milliGRAM(s) Oral every 6 hours PRN Mild Pain (1 - 3)  albuterol    90 MICROgram(s) HFA Inhaler 2 Puff(s) Inhalation every 6 hours PRN Bronchospasm  aluminum hydroxide/magnesium hydroxide/simethicone Suspension 30 milliLiter(s) Oral every 4 hours PRN Dyspepsia  bisacodyl 5 milliGRAM(s) Oral daily PRN Constipation  dextrose Oral Gel 15 Gram(s) Oral once PRN Blood Glucose LESS THAN 70 milliGRAM(s)/deciliter  dextrose Oral Gel 15 Gram(s) Oral once PRN hypoglycemia  diphenhydrAMINE Elixir 12.5 milliGRAM(s) Oral every 6 hours PRN eps prevention  diphenhydrAMINE Injectable 25 milliGRAM(s) IntraMuscular once PRN EPS prevention  fluPHENAZine 5 milliGRAM(s) Oral every 6 hours PRN agitation  fluPHENAZine  Injectable 5 milliGRAM(s) IntraMuscular once PRN agitation  polyethylene glycol 3350 17 Gram(s) Oral daily PRN constipation  
MEDICATIONS  (PRN):  acetaminophen     Tablet .. 650 milliGRAM(s) Oral every 6 hours PRN Mild Pain (1 - 3)  albuterol    90 MICROgram(s) HFA Inhaler 2 Puff(s) Inhalation every 6 hours PRN Bronchospasm  aluminum hydroxide/magnesium hydroxide/simethicone Suspension 30 milliLiter(s) Oral every 4 hours PRN Dyspepsia  dextrose Oral Gel 15 Gram(s) Oral once PRN Blood Glucose LESS THAN 70 milliGRAM(s)/deciliter  OLANZapine 2.5 milliGRAM(s) Oral four times a day PRN agitation  OLANZapine Injectable 3.75 milliGRAM(s) IntraMuscular once PRN agitation  
MEDICATIONS  (PRN):  acetaminophen     Tablet .. 650 milliGRAM(s) Oral every 6 hours PRN Mild Pain (1 - 3)  albuterol    90 MICROgram(s) HFA Inhaler 2 Puff(s) Inhalation every 6 hours PRN Bronchospasm  aluminum hydroxide/magnesium hydroxide/simethicone Suspension 30 milliLiter(s) Oral every 4 hours PRN Dyspepsia  dextrose Oral Gel 15 Gram(s) Oral once PRN Blood Glucose LESS THAN 70 milliGRAM(s)/deciliter  dextrose Oral Gel 15 Gram(s) Oral once PRN hypoglycemia  diphenhydrAMINE Elixir 12.5 milliGRAM(s) Oral every 6 hours PRN eps prevention  diphenhydrAMINE Injectable 12.5 milliGRAM(s) IntraMuscular once PRN EPS prevention  fluPHENAZine 5 milliGRAM(s) Oral every 6 hours PRN agitation  fluPHENAZine  Injectable 5 milliGRAM(s) IntraMuscular once PRN agitation  
MEDICATIONS  (PRN):  acetaminophen     Tablet .. 650 milliGRAM(s) Oral every 6 hours PRN Mild Pain (1 - 3)  albuterol    90 MICROgram(s) HFA Inhaler 2 Puff(s) Inhalation every 6 hours PRN Bronchospasm  aluminum hydroxide/magnesium hydroxide/simethicone Suspension 30 milliLiter(s) Oral every 4 hours PRN Dyspepsia  dextrose Oral Gel 15 Gram(s) Oral once PRN hypoglycemia  dextrose Oral Gel 15 Gram(s) Oral once PRN Blood Glucose LESS THAN 70 milliGRAM(s)/deciliter  OLANZapine 2.5 milliGRAM(s) Oral four times a day PRN agitation  OLANZapine Injectable 3.75 milliGRAM(s) IntraMuscular once PRN agitation  OLANZapine Injectable 2.5 milliGRAM(s) IntraMuscular once PRN Agitation  
MEDICATIONS  (PRN):  acetaminophen     Tablet .. 650 milliGRAM(s) Oral every 6 hours PRN Mild Pain (1 - 3)  albuterol    90 MICROgram(s) HFA Inhaler 2 Puff(s) Inhalation every 6 hours PRN Bronchospasm  aluminum hydroxide/magnesium hydroxide/simethicone Suspension 30 milliLiter(s) Oral every 4 hours PRN Dyspepsia  dextrose Oral Gel 15 Gram(s) Oral once PRN Blood Glucose LESS THAN 70 milliGRAM(s)/deciliter  dextrose Oral Gel 15 Gram(s) Oral once PRN hypoglycemia  OLANZapine 2.5 milliGRAM(s) Oral four times a day PRN agitation  OLANZapine Injectable 3.75 milliGRAM(s) IntraMuscular once PRN agitation  
MEDICATIONS  (PRN):  acetaminophen     Tablet .. 650 milliGRAM(s) Oral every 6 hours PRN Mild Pain (1 - 3)  albuterol    90 MICROgram(s) HFA Inhaler 2 Puff(s) Inhalation every 6 hours PRN Bronchospasm  aluminum hydroxide/magnesium hydroxide/simethicone Suspension 30 milliLiter(s) Oral every 4 hours PRN Dyspepsia  dextrose Oral Gel 15 Gram(s) Oral once PRN hypoglycemia  dextrose Oral Gel 15 Gram(s) Oral once PRN Blood Glucose LESS THAN 70 milliGRAM(s)/deciliter  diphenhydrAMINE Elixir 12.5 milliGRAM(s) Oral every 6 hours PRN eps prevention  diphenhydrAMINE Injectable 12.5 milliGRAM(s) IntraMuscular once PRN EPS prevention  fluPHENAZine 5 milliGRAM(s) Oral every 6 hours PRN agitation  fluPHENAZine  Injectable 5 milliGRAM(s) IntraMuscular once PRN agitation  
MEDICATIONS  (PRN):  acetaminophen     Tablet .. 650 milliGRAM(s) Oral every 6 hours PRN Mild Pain (1 - 3)  albuterol    90 MICROgram(s) HFA Inhaler 2 Puff(s) Inhalation every 6 hours PRN Bronchospasm  aluminum hydroxide/magnesium hydroxide/simethicone Suspension 30 milliLiter(s) Oral every 4 hours PRN Dyspepsia  dextrose Oral Gel 15 Gram(s) Oral once PRN hypoglycemia  dextrose Oral Gel 15 Gram(s) Oral once PRN Blood Glucose LESS THAN 70 milliGRAM(s)/deciliter  OLANZapine 2.5 milliGRAM(s) Oral four times a day PRN agitation  OLANZapine Injectable 3.75 milliGRAM(s) IntraMuscular once PRN agitation  OLANZapine Injectable 2.5 milliGRAM(s) IntraMuscular once PRN Agitation

## 2025-02-25 NOTE — EEG REPORT - NS EEG TEXT BOX
[facility] Department of Neurology  Inpatient Continuous video-Electroencephalography Report    Acquisition Details:  Electroencephalography was acquired using a minimum of 21 channels on an [employer] Neu[employer] system v 8.5.1 with electrode placement according to the standard International 10-20 system following ACNS (American Clinical Neurophysiology Society) guidelines for Long-Term Video EEG monitoring.  Anterior temporal T1 and T2 electrodes were utilized whenever possible.   The [employer] automated spike & seizure detections were all reviewed in detail, in addition to extensive portions of raw EEG.    Day 4: 12/19/2021 – 12/20/2021 to 10:11 AM (end of study)  Background:  continuous, with predominantly delta and theta frequencies.  Symmetry:  No persistent asymmetries of voltage or frequency.  Posterior Dominant Rhythm:  8 Hz symmetric, well-organized, and poorly-modulated.  Organization: Rudimentary.  Voltage:  Normal (20+ uV)  Variability: Yes. 		Reactivity: Yes.  N2 sleep: Absent.  Spontaneous Activity:  No epileptiform discharges.  Periodic/rhythmic activity:  None.  Events:  No electrographic seizures or significant clinical events.  Provocations:  Hyperventilation and Photic stimulation: was not performed.    Daily Summary:    Mild generalized diffuse or multifocal dysfunction.  No epileptiform activity and no significant clinical events occurred.  Unchanged from prior day.     Kranthi Chairez DO  Clinical Neurophysiology Fellow    Arleth Shepherd MD  Attending Neurologist, [facility]   No refill protocol / failed protocol.     Recent fills each # 30 : 01/27/2025  Last prescription written: 01/23/2025  Last office visit:  2/4/2025    Future Appointments   Date Time Provider Department Center   3/11/2025  8:00 AM [facility]   6/17/2025 10:15 AM Zoe Black MD [facility]      Requested Prescriptions     Pending Prescriptions Disp Refills    LORazepam 1 MG Oral Tab 30 tablet 0     Sig: Take 1 tablet (1 mg total) by mouth nightly as needed.     Sent [facility] message to patient the refill request was forwarded to provider.

## 2025-04-24 NOTE — ED ADULT NURSE NOTE - NSFALLRISK_ED_ALL_ED
Called Patient and reminded patient of their appointment on 04/30/2025 and patient confirmed they would be here. Reminded patient to just come at appointment time, and to not come at the lab appointment time. Reminded patient that we will not check them in any more than 30 minutes before appointment time.  We have now moved to the [facility] [place] that is located between our old office and the ER at the [facility]. Letting the Pt know that our front entrance faces the  [place]. Reminded pt to eat well and be well hydrated for their labs.   
No

## 2025-04-25 NOTE — BH SOCIAL WORK INITIAL PSYCHOSOCIAL EVALUATION - NSBHPROFILEREVIEW_PSY_ALL_CORE
Medication:   Requested Prescriptions     Pending Prescriptions Disp Refills    amLODIPine (NORVASC) 10 MG tablet [Pharmacy Med Name: AMLODIPINE BESYLATE 10 MG TAB] 90 tablet 0     Sig: TAKE 1 TABLET BY MOUTH EVERY DAY     Last Filled:  1.27.25    Last appt: 3/13/2025   Next appt: 9/18/2025    Last OARRS:        No data to display               No

## 2025-05-30 NOTE — H&P ADULT - ASSESSMENT
Pt is calling with questions  on her meds before watchman and ablation on Monday 6/2   This is a 72 y/o F with PMH of recurrent major depressive disorder, htn, niddmt2, hld, parkinson's disease (not on active meds), hypothyroidism, hx of seizure disorder per chart review who presented from [facility] overnight after noted to have unwitnessed fall around 0028 hours, lying on L side, disoriented, slurring her words, episode of urinary incontinence without recollection of preceding events. Stroke code on presentation. Admitted for further workup for possible seizure.

## 2025-06-13 ENCOUNTER — APPOINTMENT (OUTPATIENT)
Dept: UROLOGY | Facility: CLINIC | Age: 75
End: 2025-06-13
Payer: MEDICARE

## 2025-06-13 ENCOUNTER — NON-APPOINTMENT (OUTPATIENT)
Age: 75
End: 2025-06-13

## 2025-06-13 PROCEDURE — 99213 OFFICE O/P EST LOW 20 MIN: CPT

## 2025-06-20 ENCOUNTER — OUTPATIENT (OUTPATIENT)
Dept: OUTPATIENT SERVICES | Facility: HOSPITAL | Age: 75
LOS: 1 days | End: 2025-06-20
Payer: MEDICAID

## 2025-06-20 ENCOUNTER — APPOINTMENT (OUTPATIENT)
Dept: ULTRASOUND IMAGING | Facility: CLINIC | Age: 75
End: 2025-06-20

## 2025-06-20 DIAGNOSIS — N81.11 CYSTOCELE, MIDLINE: ICD-10-CM

## 2025-06-20 PROCEDURE — 76770 US EXAM ABDO BACK WALL COMP: CPT

## 2025-06-20 PROCEDURE — 76770 US EXAM ABDO BACK WALL COMP: CPT | Mod: 26

## 2025-06-25 ENCOUNTER — OUTPATIENT (OUTPATIENT)
Dept: OUTPATIENT SERVICES | Facility: HOSPITAL | Age: 75
LOS: 1 days | End: 2025-06-25
Payer: MEDICAID

## 2025-06-25 ENCOUNTER — APPOINTMENT (OUTPATIENT)
Dept: PODIATRY | Facility: HOSPITAL | Age: 75
End: 2025-06-25
Payer: MEDICAID

## 2025-06-25 ENCOUNTER — NON-APPOINTMENT (OUTPATIENT)
Age: 75
End: 2025-06-25

## 2025-06-25 VITALS
WEIGHT: 160 LBS | HEIGHT: 64 IN | HEART RATE: 73 BPM | RESPIRATION RATE: 16 BRPM | TEMPERATURE: 98 F | OXYGEN SATURATION: 96 % | SYSTOLIC BLOOD PRESSURE: 135 MMHG | DIASTOLIC BLOOD PRESSURE: 86 MMHG | BODY MASS INDEX: 27.31 KG/M2

## 2025-06-25 DIAGNOSIS — B35.1 TINEA UNGUIUM: ICD-10-CM

## 2025-06-25 DIAGNOSIS — M79.609 PAIN IN UNSPECIFIED LIMB: ICD-10-CM

## 2025-06-25 DIAGNOSIS — E11.9 TYPE 2 DIABETES MELLITUS WITHOUT COMPLICATIONS: ICD-10-CM

## 2025-06-25 PROBLEM — D17.9 ANGIOMYOLIPOMA: Status: ACTIVE | Noted: 2025-06-25

## 2025-06-25 PROCEDURE — 99202 OFFICE O/P NEW SF 15 MIN: CPT

## 2025-06-25 PROCEDURE — G0463: CPT

## 2025-06-26 ENCOUNTER — APPOINTMENT (OUTPATIENT)
Dept: UROLOGY | Facility: CLINIC | Age: 75
End: 2025-06-26

## 2025-07-15 PROCEDURE — G0463: CPT

## 2025-07-15 PROCEDURE — 76770 US EXAM ABDO BACK WALL COMP: CPT

## 2025-08-08 ENCOUNTER — APPOINTMENT (OUTPATIENT)
Dept: NEUROLOGY | Facility: CLINIC | Age: 75
End: 2025-08-08

## 2025-08-11 ENCOUNTER — APPOINTMENT (OUTPATIENT)
Dept: NEUROLOGY | Facility: CLINIC | Age: 75
End: 2025-08-11
Payer: MEDICAID

## 2025-08-11 VITALS — BODY MASS INDEX: 27.31 KG/M2 | HEIGHT: 64 IN | WEIGHT: 160 LBS

## 2025-08-11 DIAGNOSIS — M54.50 LOW BACK PAIN, UNSPECIFIED: ICD-10-CM

## 2025-08-11 PROCEDURE — 99245 OFF/OP CONSLTJ NEW/EST HI 55: CPT

## 2025-08-28 ENCOUNTER — APPOINTMENT (OUTPATIENT)
Dept: VASCULAR SURGERY | Facility: CLINIC | Age: 75
End: 2025-08-28

## 2025-09-12 ENCOUNTER — APPOINTMENT (OUTPATIENT)
Dept: NEUROLOGY | Facility: HOSPITAL | Age: 75
End: 2025-09-12

## 2025-09-17 ENCOUNTER — APPOINTMENT (OUTPATIENT)
Dept: ORTHOPEDIC SURGERY | Facility: HOSPITAL | Age: 75
End: 2025-09-17

## 2025-09-17 ENCOUNTER — APPOINTMENT (OUTPATIENT)
Dept: PODIATRY | Facility: HOSPITAL | Age: 75
End: 2025-09-17

## 2025-09-17 ENCOUNTER — RESULT REVIEW (OUTPATIENT)
Age: 75
End: 2025-09-17

## 2025-09-17 VITALS
TEMPERATURE: 97.6 F | BODY MASS INDEX: 27.31 KG/M2 | OXYGEN SATURATION: 96 % | DIASTOLIC BLOOD PRESSURE: 77 MMHG | HEIGHT: 64 IN | SYSTOLIC BLOOD PRESSURE: 132 MMHG | RESPIRATION RATE: 16 BRPM | HEART RATE: 84 BPM | WEIGHT: 160 LBS